# Patient Record
Sex: FEMALE | Race: WHITE | NOT HISPANIC OR LATINO | Employment: OTHER | URBAN - METROPOLITAN AREA
[De-identification: names, ages, dates, MRNs, and addresses within clinical notes are randomized per-mention and may not be internally consistent; named-entity substitution may affect disease eponyms.]

---

## 2017-01-11 ENCOUNTER — INFUSION THERAPY VISIT (OUTPATIENT)
Dept: INFUSION THERAPY | Facility: CLINIC | Age: 77
End: 2017-01-11
Attending: ALLERGY & IMMUNOLOGY
Payer: MEDICARE

## 2017-01-11 VITALS
OXYGEN SATURATION: 98 % | HEART RATE: 62 BPM | WEIGHT: 146.3 LBS | RESPIRATION RATE: 16 BRPM | TEMPERATURE: 98.3 F | DIASTOLIC BLOOD PRESSURE: 59 MMHG | SYSTOLIC BLOOD PRESSURE: 117 MMHG | BODY MASS INDEX: 19.84 KG/M2

## 2017-01-11 DIAGNOSIS — D83.9 CVID (COMMON VARIABLE IMMUNODEFICIENCY) (H): Primary | ICD-10-CM

## 2017-01-11 PROCEDURE — 96375 TX/PRO/DX INJ NEW DRUG ADDON: CPT

## 2017-01-11 PROCEDURE — 25000125 ZZHC RX 250: Performed by: ALLERGY & IMMUNOLOGY

## 2017-01-11 PROCEDURE — 25000128 H RX IP 250 OP 636: Performed by: ALLERGY & IMMUNOLOGY

## 2017-01-11 PROCEDURE — 96365 THER/PROPH/DIAG IV INF INIT: CPT

## 2017-01-11 PROCEDURE — 96366 THER/PROPH/DIAG IV INF ADDON: CPT

## 2017-01-11 RX ADMIN — HUMAN IMMUNOGLOBULIN G 35 G: 20 LIQUID INTRAVENOUS at 11:20

## 2017-01-11 RX ADMIN — HYDROCORTISONE SODIUM SUCCINATE 100 MG: 100 INJECTION, POWDER, FOR SOLUTION INTRAMUSCULAR; INTRAVENOUS at 10:54

## 2017-01-11 NOTE — MR AVS SNAPSHOT
After Visit Summary   1/11/2017    Tricia Sosa    MRN: [id number]           Patient Information     Date Of Birth          1940        Visit Information        Provider Department      1/11/2017 10:00 AM RH INFUSION CHAIR 12 [facility]        Today's Diagnoses     CVID (common variable immunodeficiency) (H)    -  1        Follow-ups after your visit        Your next 10 appointments already scheduled     Feb 08, 2017 10:30 AM   Level 5 with RH INFUSION CHAIR 10   [facility] ([facility])    [facility]  [address]  [place] [address]   [phone]            Feb 14, 2017  1:15 PM   Return Visit with Jose Summers MD   [facility] ([facility])    [facility]  [address]  [facility] [address]   [phone]            Mar 08, 2017 10:30 AM   Level 5 with RH INFUSION CHAIR 2   [facility] ([facility])    [facility]  [address]  [facility] [address]   [phone]            Apr 05, 2017 10:30 AM   Level 5 with RH INFUSION CHAIR 5   [facility] ([facility])    [facility]  [address]  [facility] [address]   [phone]            May 03, 2017 10:30 AM   Level 5 with RH INFUSION CHAIR 2   [facility] ([facility])    [facility] [address]  [place] [address]   [phone]            May 31, 2017 10:30 AM   Level 5 with RH INFUSION CHAIR 2   [facility] ([facility])    [facility] [address]  [place] [address]   [phone]               Who to contact     If you have questions or need follow up information about today's clinic visit or your schedule please contact [facility] directly at [phone].  Normal or non-critical lab and imaging results will be communicated to you by MyChart, letter or phone within 4 business days after the clinic has received the results. If you do not hear from us within 7 days, please contact the clinic through [employer] or phone. If you have a critical or abnormal lab result, we will notify you by phone as soon as possible.  Submit refill requests through [employer] or call your pharmacy and they will forward the refill request to us. Please allow 3 business days for your refill to be completed.          Additional Information About Your Visit        [employer] Information     [employer] gives you secure access to your electronic health record. If you see a primary care provider, you can also send messages to your care team and make appointments. If you have questions, please call your primary care clinic.  If you do not have a primary care provider, please call [phone] and they will assist you.        Care EveryWhere ID     This is your Care EveryWhere ID. This could be used by other organizations to access your [place] medical records  [id number]        Your Vitals Were     Pulse Temperature Respirations Pulse Oximetry          62 98.3  F (36.8  C) (Tympanic) 16 98%         Blood Pressure from Last 3 Encounters:   01/11/17 117/59   12/06/16 126/60   11/11/16 119/57    Weight from Last 3 Encounters:   01/11/17 66.361 kg (146 lb 4.8 oz)   12/06/16 65.59 kg (144 lb 9.6 oz)   11/11/16 66.134 kg (145 lb 12.8 oz)              Today, you had the following     No orders found for display       Primary Care Provider Office Phone # Fax #    SHANIQUE Walsh Ra [facility] [phone]       [facility] [place] [address]  [facility] [address]        Thank you!     Thank you  for [facility]  for your care. Our goal is always to provide you with excellent care. Hearing back from our patients is one way we can continue to improve our services. Please take a few minutes to complete the written survey that you may receive in the mail after your visit with us. Thank you!             Your Updated Medication List - Protect others around you: Learn how to safely use, store and throw away your medicines at www.disposemymeds.org.          This list is accurate as of: 1/11/17  2:00 PM.  Always use your most recent med list.                   Brand Name Dispense Instructions for use    cyanocobalamin 1000 MCG tablet    vitamin  B-12         folic acid 1 MG tablet    FOLVITE         ketoconazole 2 % shampoo    NIZORAL         levothyroxine 137 MCG tablet    SYNTHROID/LEVOTHROID    90 tablet    Take 1 tablet (137 mcg) by mouth daily       sulfamethoxazole-trimethoprim 800-160 MG per tablet    BACTRIM DS    36 tablet    Take 1 tablet three times per week.

## 2017-01-11 NOTE — PROGRESS NOTES
"Infusion Nursing Note:  Tricia Sosa presents today for IVIG.    Patient seen by provider today: No    Note: Patient had IVIG last March and experienced sinus headaches, nausea, chills starting the day after infusion. Therefore, nurse infused IVIG at 0.5mg/kg/hr and increased by 0.5mg/kg/hr every 15 minutes for a max of 4mg/kg/hr. Patient aware to let us know at her last infusion if she got any headaches. Dr order for Prednisone after todays infusion only if patient experiences any side effects. Patient is to call Dr. Rutherford office first before taking Prednisone.  If she has any unusual symptoms she is to call Dr. Rutherford and ask whether she should take the Prednisone or not. If patient doesn't experience any symptoms, she is aware NOT to take Prednisone. Patient said she has Prednisone 5, mg and 10 mg tablets at home from a recent prescription. Per pharmacy, no need to refill another prescription.     Intravenous Access:  Peripheral IV placed.    Treatment Conditions:  Rheumatology Infusion Checklist PRIOR TO INFUSION OF BIOLOGICAL MEDICATIONS OR ANY OF THESE AS LISTED: Rituxan (rituximab) \".rheumbiologicalchecklist\"    Prior to Infusion of biological medications or any of these as listed:    1. Elevated temperature, fever, chills, productive cough or abnormal vital signs, night sweats, coughing up blood or sputum, no appetite or abnormal vital signs : NO    2. Open wounds or new incisions: NO    3. Recent hospitalization: NO    4.  Recent surgeries:  NO    5. Any upcoming surgeries or dental procedures?:NO    6. Any current or recent bouts of illness or infection? On any antibiotics? : NO    7. Any new, sudden or worsening abdominal pain :NO    8. Vaccination within 4 weeks? Patient or someone in the household is scheduled to receive vaccination? No live virus vaccines prior to or during treatment :NO    9. Any nervous system diseases [i.e. multiple sclerosis, Guillain-Garnett, seizures, neurological  changes]: " NO    10. Pregnant or breast feeding; or plans on pregnancy in the future: NO    11. Signs of worsening depression or suicidal ideations while taking benlysta:NO    12. New-onset medical symptoms: NO    13.  New cancer diagnosis or on chemotherapy or radiation NO    14.  Evaluate for any sign of active TB [Unexplained weight loss, Loss of appetite, Night sweats, Fever, Fatigue, Chills, Coughing for 3 weeks or longer, Hemoptysis (coughing up blood), Chest pain]: NO    **Note: If answered yes to any of the above, hold the infusion and contact ordering rheumatologist or on-call rheumatologist.   .      Post Infusion Assessment:  Patient tolerated infusion without incident.  Blood return noted pre and post infusion.  Site patent and intact, free from redness, edema or discomfort.  No evidence of extravasations.  Access discontinued per protocol.    Discharge Plan:   Patient declined prescription refills.  Copy of AVS reviewed with patient and/or family.     spoke with Marty Meese, Pharmacist and they discussed her returning every 4 weeks for treatment. Patient to stop by scheduling office to make next appt due in 4 weeks.    Estrellita Choi RN

## 2017-02-06 DIAGNOSIS — D59.19 OTHER AUTOIMMUNE HEMOLYTIC ANEMIAS: ICD-10-CM

## 2017-02-06 DIAGNOSIS — D83.9 CVID (COMMON VARIABLE IMMUNODEFICIENCY) (H): ICD-10-CM

## 2017-02-06 LAB — LDH SERPL L TO P-CCNC: 767 U/L (ref 81–234)

## 2017-02-06 PROCEDURE — 82784 ASSAY IGA/IGD/IGG/IGM EACH: CPT | Mod: 59 | Performed by: INTERNAL MEDICINE

## 2017-02-06 PROCEDURE — 85045 AUTOMATED RETICULOCYTE COUNT: CPT | Performed by: INTERNAL MEDICINE

## 2017-02-06 PROCEDURE — 80053 COMPREHEN METABOLIC PANEL: CPT | Performed by: INTERNAL MEDICINE

## 2017-02-06 PROCEDURE — 82607 VITAMIN B-12: CPT | Performed by: INTERNAL MEDICINE

## 2017-02-06 PROCEDURE — 83540 ASSAY OF IRON: CPT | Performed by: INTERNAL MEDICINE

## 2017-02-06 PROCEDURE — 82747 ASSAY OF FOLIC ACID RBC: CPT | Mod: 90 | Performed by: INTERNAL MEDICINE

## 2017-02-06 PROCEDURE — 85025 COMPLETE CBC W/AUTO DIFF WBC: CPT | Performed by: INTERNAL MEDICINE

## 2017-02-06 PROCEDURE — 85060 BLOOD SMEAR INTERPRETATION: CPT | Performed by: INTERNAL MEDICINE

## 2017-02-06 PROCEDURE — 83550 IRON BINDING TEST: CPT | Performed by: INTERNAL MEDICINE

## 2017-02-06 PROCEDURE — 82728 ASSAY OF FERRITIN: CPT | Performed by: INTERNAL MEDICINE

## 2017-02-06 PROCEDURE — 99000 SPECIMEN HANDLING OFFICE-LAB: CPT | Performed by: INTERNAL MEDICINE

## 2017-02-06 PROCEDURE — 36415 COLL VENOUS BLD VENIPUNCTURE: CPT | Performed by: INTERNAL MEDICINE

## 2017-02-06 PROCEDURE — 83010 ASSAY OF HAPTOGLOBIN QUANT: CPT | Performed by: INTERNAL MEDICINE

## 2017-02-06 PROCEDURE — 83615 LACTATE (LD) (LDH) ENZYME: CPT | Performed by: INTERNAL MEDICINE

## 2017-02-06 PROCEDURE — 82784 ASSAY IGA/IGD/IGG/IGM EACH: CPT | Performed by: INTERNAL MEDICINE

## 2017-02-07 LAB
ALBUMIN SERPL-MCNC: 4.4 G/DL (ref 3.4–5)
ALP SERPL-CCNC: 88 U/L (ref 40–150)
ALT SERPL W P-5'-P-CCNC: 40 U/L (ref 0–50)
ANION GAP SERPL CALCULATED.3IONS-SCNC: 9 MMOL/L (ref 3–14)
AST SERPL W P-5'-P-CCNC: 80 U/L (ref 0–45)
BILIRUB SERPL-MCNC: 2 MG/DL (ref 0.2–1.3)
BUN SERPL-MCNC: 14 MG/DL (ref 7–30)
CALCIUM SERPL-MCNC: 8.4 MG/DL (ref 8.5–10.1)
CHLORIDE SERPL-SCNC: 104 MMOL/L (ref 94–109)
CO2 SERPL-SCNC: 29 MMOL/L (ref 20–32)
COPATH REPORT: NORMAL
CREAT SERPL-MCNC: 0.74 MG/DL (ref 0.52–1.04)
DIFFERENTIAL METHOD BLD: ABNORMAL
EOSINOPHIL # BLD AUTO: 0.1 10E9/L (ref 0–0.7)
EOSINOPHIL NFR BLD AUTO: 1 %
ERYTHROCYTE [DISTWIDTH] IN BLOOD BY AUTOMATED COUNT: 13.5 % (ref 10–15)
FERRITIN SERPL-MCNC: 45 NG/ML (ref 8–252)
GFR SERPL CREATININE-BSD FRML MDRD: 76 ML/MIN/1.7M2
GLUCOSE SERPL-MCNC: 83 MG/DL (ref 70–99)
HAPTOGLOB SERPL-MCNC: <6 MG/DL (ref 35–175)
HCT VFR BLD AUTO: 28.4 % (ref 35–47)
HGB BLD-MCNC: 10.1 G/DL (ref 11.7–15.7)
IGA SERPL-MCNC: <7 MG/DL (ref 70–380)
IGG SERPL-MCNC: 697 MG/DL (ref 695–1620)
IGM SERPL-MCNC: 82 MG/DL (ref 60–265)
IRON SATN MFR SERPL: 23 % (ref 15–46)
IRON SERPL-MCNC: 75 UG/DL (ref 35–180)
LYMPHOCYTES # BLD AUTO: 1 10E9/L (ref 0.8–5.3)
LYMPHOCYTES NFR BLD AUTO: 18 %
MCH RBC QN AUTO: 35.7 PG (ref 26.5–33)
MCHC RBC AUTO-ENTMCNC: 35.6 G/DL (ref 31.5–36.5)
MCV RBC AUTO: 100 FL (ref 78–100)
MONOCYTES # BLD AUTO: 0.3 10E9/L (ref 0–1.3)
MONOCYTES NFR BLD AUTO: 5 %
NEUTROPHILS # BLD AUTO: 4 10E9/L (ref 1.6–8.3)
NEUTROPHILS NFR BLD AUTO: 76 %
PLATELET # BLD AUTO: 178 10E9/L (ref 150–450)
POTASSIUM SERPL-SCNC: 3.9 MMOL/L (ref 3.4–5.3)
PROT SERPL-MCNC: 6.8 G/DL (ref 6.8–8.8)
RBC # BLD AUTO: 2.83 10E12/L (ref 3.8–5.2)
RBC MORPH BLD: ABNORMAL
RETICS # AUTO: 81.5 10E9/L (ref 25–95)
RETICS/RBC NFR AUTO: 2.9 % (ref 0.5–2)
SODIUM SERPL-SCNC: 142 MMOL/L (ref 133–144)
TIBC SERPL-MCNC: 325 UG/DL (ref 240–430)
VARIANT LYMPHS BLD QL SMEAR: PRESENT
VIT B12 SERPL-MCNC: 854 PG/ML (ref 193–986)
WBC # BLD AUTO: 5.4 10E9/L (ref 4–11)

## 2017-02-08 ENCOUNTER — INFUSION THERAPY VISIT (OUTPATIENT)
Dept: INFUSION THERAPY | Facility: CLINIC | Age: 77
End: 2017-02-08
Attending: ALLERGY & IMMUNOLOGY
Payer: MEDICARE

## 2017-02-08 VITALS
TEMPERATURE: 97.5 F | RESPIRATION RATE: 16 BRPM | BODY MASS INDEX: 19.81 KG/M2 | SYSTOLIC BLOOD PRESSURE: 135 MMHG | DIASTOLIC BLOOD PRESSURE: 68 MMHG | HEART RATE: 75 BPM | WEIGHT: 146.1 LBS

## 2017-02-08 DIAGNOSIS — D83.9 CVID (COMMON VARIABLE IMMUNODEFICIENCY) (H): Primary | ICD-10-CM

## 2017-02-08 LAB
FOLATE RBC-MCNC: NORMAL NG/ML
HCT VFR BLD CALC: 28.4 %

## 2017-02-08 PROCEDURE — 96366 THER/PROPH/DIAG IV INF ADDON: CPT

## 2017-02-08 PROCEDURE — 96375 TX/PRO/DX INJ NEW DRUG ADDON: CPT

## 2017-02-08 PROCEDURE — 96365 THER/PROPH/DIAG IV INF INIT: CPT

## 2017-02-08 PROCEDURE — 25000128 H RX IP 250 OP 636: Performed by: ALLERGY & IMMUNOLOGY

## 2017-02-08 RX ADMIN — HUMAN IMMUNOGLOBULIN G 35 G: 20 LIQUID INTRAVENOUS at 11:16

## 2017-02-08 RX ADMIN — HYDROCORTISONE SODIUM SUCCINATE 100 MG: 100 INJECTION, POWDER, FOR SOLUTION INTRAMUSCULAR; INTRAVENOUS at 10:51

## 2017-02-08 NOTE — PROGRESS NOTES
"Infusion Nursing Note:  Tricia Sosa presents today for IVIG.    Patient seen by provider today: No   present during visit today: Not Applicable.    Note: Patient tolerated IVIG last time with just solucortef as premed (previously would get benadryl and tylenol).  Did not have to take prednisone after.    Started infusion at 0.5mg/kg/hr and increased by 0.5mg/kg/hr every 15 minutes for a maximum of 4mg/kg/hr.    Intravenous Access:  Peripheral IV placed.    Treatment Conditions:  Rheumatology Infusion Checklist: PRIOR TO INFUSION OF BIOLOGICAL MEDICATIONS OR ANY OF THESE AS LISTED: Immune Globulin (IVIG) \".rheumbiologicalchecklist\"    Prior to Infusion of biological medications or any of these as listed:    1. Elevated temperature, fever, chills, productive cough or abnormal vital signs, night sweats, coughing up blood or sputum, no appetite or abnormal vital signs : NO    2. Open wounds or new incisions: NO    3. Recent hospitalization: NO    4.  Recent surgeries:  NO    5. Any upcoming surgeries or dental procedures?:NO    6. Any current or recent bouts of illness or infection? On any antibiotics? : NO    7. Any new, sudden or worsening abdominal pain :NO    8. Vaccination within 4 weeks? Patient or someone in the household is scheduled to receive vaccination? No live virus vaccines prior to or during treatment :NO    9. Any nervous system diseases [i.e. multiple sclerosis, Guillain-Glendo, seizures, neurological  changes]: NO    10. Pregnant or breast feeding; or plans on pregnancy in the future: NO    11. Signs of worsening depression or suicidal ideations while taking benlysta:NO    12. New-onset medical symptoms: NO    13.  New cancer diagnosis or on chemotherapy or radiation NO    14.  Evaluate for any sign of active TB [Unexplained weight loss, Loss of appetite, Night sweats, Fever, Fatigue, Chills, Coughing for 3 weeks or longer, Hemoptysis (coughing up blood), Chest pain]: NO    **Note: If " answered yes to any of the above, hold the infusion and contact ordering rheumatologist or on-call rheumatologist.   .      Post Infusion Assessment:  Patient tolerated infusion without incident.  Blood return noted pre and post infusion.  Site patent and intact, free from redness, edema or discomfort.  No evidence of extravasations.  Access discontinued per protocol.    Discharge Plan:   Copy of AVS reviewed with patient and/or family.  Patient will return 2/14 with Dr. Summers and 3/8 for IVIG for next appointment.  Patient discharged in stable condition accompanied by: self.  Departure Mode: Ambulatory.    Ally Adams RN

## 2017-02-14 ENCOUNTER — ONCOLOGY VISIT (OUTPATIENT)
Dept: ONCOLOGY | Facility: CLINIC | Age: 77
End: 2017-02-14
Attending: INTERNAL MEDICINE
Payer: COMMERCIAL

## 2017-02-14 VITALS
DIASTOLIC BLOOD PRESSURE: 75 MMHG | BODY MASS INDEX: 19.8 KG/M2 | HEART RATE: 76 BPM | RESPIRATION RATE: 16 BRPM | SYSTOLIC BLOOD PRESSURE: 128 MMHG | WEIGHT: 146 LBS | TEMPERATURE: 98.5 F

## 2017-02-14 DIAGNOSIS — D83.9 CVID (COMMON VARIABLE IMMUNODEFICIENCY) (H): Primary | ICD-10-CM

## 2017-02-14 PROCEDURE — 99213 OFFICE O/P EST LOW 20 MIN: CPT | Performed by: INTERNAL MEDICINE

## 2017-02-14 PROCEDURE — 99211 OFF/OP EST MAY X REQ PHY/QHP: CPT

## 2017-02-14 RX ORDER — DIPHENHYDRAMINE HCL 50 MG
50 CAPSULE ORAL ONCE
Status: CANCELLED
Start: 2017-03-07

## 2017-02-14 RX ORDER — ACETAMINOPHEN 325 MG/1
650 TABLET ORAL ONCE
Status: CANCELLED
Start: 2017-03-07

## 2017-02-14 NOTE — MR AVS SNAPSHOT
After Visit Summary   2/14/2017    Shorty Sosa    MRN: [id number]           Patient Information     Date Of Birth          1940        Visit Information        Provider Department      2/14/2017 1:15 PM Melina, Jose Escudero MD [facility]        Care Instructions    - Follow up in 2 months with labs prior to clinic visit (few days prior to visit) Scheduled 4/7/17, shorty will get her labs drawn at [facility], she will call to schedule appt. Aura KELLEY      - Move the infusions to Tuesday or Friday Scheduled 3/10, 4/7, 5/5, 6/2 Aura KELLEY      - I will see her in around 8 weeks  (first week of April). I can see her in infusion Scheduled 4/7/17 Aura KELLEY  AVS given to patient           Follow-ups after your visit        Your next 10 appointments already scheduled     Mar 10, 2017 10:30 AM CST   Level 5 with RH INFUSION CHAIR 5   [facility] ([facility])    [facility]  [address]  [place] [address]   [phone]            Apr 07, 2017 10:30 AM CDT   Level 5 with RH INFUSION CHAIR 2   [facility] ([facility])    [facility]  [address]  [place] [address]   [phone]            Apr 07, 2017 10:45 AM CDT   Return Visit with Jose Summers MD   [facility] ([facility])    [facility]  [address]  [place] [address]   [phone]            May 05, 2017 10:30 AM CDT   Level 5 with RH INFUSION CHAIR 5   [facility] ([facility])    [facility]  [address]  [place] [address]   [phone]            Jun 02, 2017 10:30 AM CDT   Level 5 with RH INFUSION CHAIR 5   [facility] ([facility])    [facility] [address]  [address]  [facility] [address] [phone]              Who to contact     If you have questions or need follow up information about today's clinic visit or your schedule please contact [facility] directly at [phone].  Normal or non-critical lab and imaging results will be communicated to you by MyChart, letter or phone within 4 business days after the clinic has received the results. If you do not hear from us within 7 days, please contact the clinic through MyChart or phone. If you have a critical or abnormal lab result, we will notify you by phone as soon as possible.  Submit refill requests through [employer] or call your pharmacy and they will forward the refill request to us. Please allow 3 business days for your refill to be completed.          Additional Information About Your Visit        MyChart Information     [employer] gives you secure access to your electronic health record. If you see a primary care provider, you can also send messages to your care team and make appointments. If you have questions, please call your primary care clinic.  If you do not have a primary care provider, please call [phone] and they will assist you.        Care EveryWhere ID     This is your Care EveryWhere ID. This could be used by other organizations to access your [place] medical records  [id number]        Your Vitals Were     Pulse Temperature Respirations BMI (Body Mass Index)          76 98.5  F (36.9  C) (Tympanic) 16 19.8 kg/m2         Blood Pressure from Last 3 Encounters:   02/14/17 128/75   02/08/17 135/68   01/11/17 117/59    Weight from Last 3 Encounters:   02/14/17 66.2 kg (146 lb)   02/08/17 66.3 kg (146 lb 1.6 oz)   01/11/17 66.4 kg (146 lb 4.8 oz)              Today, you had the following     No orders found for display       Primary Care Provider Office Phone # Fax #    SHANIQUE Walsh Ra CNP  [phone]       [facility] [address]  Count includes the [facility] [address]        Thank you!     Thank you for choosing [facility]  for your care. Our goal is always to provide you with excellent care. Hearing back from our patients is one way we can continue to improve our services. Please take a few minutes to complete the written survey that you may receive in the mail after your visit with us. Thank you!             Your Updated Medication List - Protect others around you: Learn how to safely use, store and throw away your medicines at www.disposemymeds.org.          This list is accurate as of: 2/14/17  2:13 PM.  Always use your most recent med list.                   Brand Name Dispense Instructions for use    cyanocobalamin 1000 MCG tablet    vitamin  B-12         folic acid 1 MG tablet    FOLVITE         ketoconazole 2 % shampoo    NIZORAL         levothyroxine 137 MCG tablet    SYNTHROID/LEVOTHROID    90 tablet    Take 1 tablet (137 mcg) by mouth daily       sulfamethoxazole-trimethoprim 800-160 MG per tablet    BACTRIM DS    36 tablet    Take 1 tablet three times per week.

## 2017-02-14 NOTE — NURSING NOTE
"Tricia Sosa is a 76 year old female who presents for:  Chief Complaint   Patient presents with     Oncology Clinic Visit     follow up/ 2 month/ hemolytic anemia        Initial Vitals:  /75 (BP Location: Right arm, Patient Position: Chair, Cuff Size: Adult Regular)  Pulse 76  Temp 98.5  F (36.9  C) (Tympanic)  Resp 16  Wt 66.2 kg (146 lb)  BMI 19.8 kg/m2 Estimated body mass index is 19.8 kg/(m^2) as calculated from the following:    Height as of 12/6/16: 1.829 m (6' 0.01\").    Weight as of this encounter: 66.2 kg (146 lb).. Body surface area is 1.83 meters squared. BP completed using cuff size: regular  Data Unavailable No LMP recorded. Patient is postmenopausal. Allergies and medications reviewed.     Medications: Medication refills not needed today.  Pharmacy name entered into [employer]:    [id number] - [facility] [address] [place] [address] PHARMACY [facility] [address] [facility]    Comments: feeling good/ wanting Dr. Summers to be her physician.    10 minutes for nursing intake (face to face time)   Ninfa Dowling LPN    DISCHARGE PLAN:  Next appointments: See patient instruction section  Departure Mode: Ambulatory  Accompanied by: self  5 minutes for nursing discharge (face to face time)   Ninfa Dowling LPN    "

## 2017-02-14 NOTE — PATIENT INSTRUCTIONS
- Follow up in 2 months with labs prior to clinic visit (few days prior to visit) Scheduled 4/7/17, shorty will get her labs drawn at [facility], she will call to schedule appt. Aura KELLEY      - Move the infusions to Tuesday or Friday Scheduled 3/10, 4/7, 5/5, 6/2 Aura KELLEY      - I will see her in around 8 weeks  (first week of April). I can see her in infusion Scheduled 4/7/17 Aura KELLEY  AVS given to patient

## 2017-02-15 NOTE — PROGRESS NOTES
[facility] CANCER CLINIC  FOLLOW-UP VISIT NOTE    PATIENT NAME: Tricia Sosa MRN # [id number]  DATE OF VISIT: Feb 14, 2017 YOB: 1940    REFERRING PROVIDER: No referring provider defined for this encounter.    DIAGNOSIS: Hemolytic anemia-autoimmune; IgA deficiency    TREATMENT SUMMARY:  Tricia has been diagnosed with IgA deficiency since her around 2000.  She was very sick at the time and had severe diarrhea.  She lost about 40 pounds in weight.  The workup revealed that she had markedly diminished CD4 counts of 48 and was diagnosed with CMV colitis.  Since then she has been followed in hematology clinic at [place] until recently.  She was noted to have anemia which was fairly long-standing.  She was initially referred for anemia in 2004 and also had a bone marrow biopsy done at that time which revealed hypercellular bone marrow with 70-80% cellularity and normal trilineage hematopoiesis and no morphologic features of MDS or lymphoproliferation.  Her anemia was attributed to her chronic underlying disease.  At the next evaluation in 2002 on 4 aggressive anemia there was no evidence of hemolysis, iron deficiency, B12 or folate deficiency.  Bone marrow biopsy was not pursued.  In summer of 2015 she had progressive fatigue, dyspnea on exertion and worsening anemia with a hemoglobin now of 9.  Workup at this time did suggest a hemolytic anemia with elevated LDH at 709, undetectable haptoglobin and elevated unconjugated bilirubin at 3.3.  Monospecific MARIKA was positive for both IgG and complement.  Cold agglutinin screen was positive with very low titer 1:64.  She was started on prednisone 60 mg daily with supplementation of iron folate and B12 starting 7/31/15.  Her prednisone has been slowly tapered and was discontinued off in January 2016.  She was last followed at [facility] on March 10 when she had stable hemoglobin.      SUBJECTIVE   Tricia comes alone for this clinic visit for  her hemolytic anemia.    Tricia is doing well overall. She has no new complains. She is here with labs done prior to clinic visit.       PAST MEDICAL HISTORY   1. Common variable immunodeficiency syndrome  2. 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 Selective IgA deficiency  4. Leukopenia with markedly low CD4 counts on Bactrim prophylaxis  5. History of fall with subdural hematoma in 6/11/14 with complete resolution  6. Hashimoto's disease status post partial thyroidectomy      CURRENT OUTPATIENT MEDICATIONS     Current Outpatient Prescriptions   Medication Sig     sulfamethoxazole-trimethoprim (BACTRIM DS) 800-160 MG per tablet Take 1 tablet three times per week.     levothyroxine (SYNTHROID, LEVOTHROID) 137 MCG tablet Take 1 tablet (137 mcg) by mouth daily     ketoconazole (NIZORAL) 2 % shampoo      cyanocobalamin (VITAMIN  B-12) 1000 MCG tablet      folic acid (FOLVITE) 1 MG tablet      No current facility-administered medications for this visit.         ALLERGIES     Allergies   Allergen Reactions     Doxycycline         REVIEW OF SYSTEMS   As above in the HPI, o/w complete 12-point ROS was negative.     PHYSICAL EXAM   /75 (BP Location: Right arm, Patient Position: Chair, Cuff Size: Adult Regular)  Pulse 76  Temp 98.5  F (36.9  C) (Tympanic)  Resp 16  Wt 66.2 kg (146 lb)  BMI 19.8 kg/m2    SpO2 Readings from Last 4 Encounters:   01/11/17 98%   12/06/16 98%   11/11/16 98%   11/02/16 99%     Wt Readings from Last 3 Encounters:   02/14/17 66.2 kg (146 lb)   02/08/17 66.3 kg (146 lb 1.6 oz)   01/11/17 66.4 kg (146 lb 4.8 oz)     GEN: NAD  HEENT: PERRL, EOMI, no icterus, injection or pallor. Oropharynx is clear.  NECK: no cervical or supraclavicular lymphadenopathy  LUNGS: clear bilaterally  CV: regular, no murmurs, rubs, or gallops  ABDOMEN: soft, non-tender, non-distended, normal bowel sounds, no hepatosplenomegaly by percussion or palpation  EXT: warm, well  perfused, no edema  NEURO: alert  SKIN: no rashes   LABORATORY AND IMAGING STUDIES     Recent Labs   Lab Test  02/06/17   1351  11/28/16   0919  11/02/16   1520  08/22/16   0901  06/06/16   1001   NA  142  142  140  142  143   POTASSIUM  3.9  4.3  3.9  4.3  3.9   CHLORIDE  104  106  106  108  109   CO2  29  28  28  28  30   ANIONGAP  9  8  6  6  4   BUN  14  11  18  11  9   CR  0.74  0.75  0.84  0.71  0.70   GLC  83  88  117*  91  104*   CULLEN  8.4*  9.0  8.6  8.6  8.6     No results for input(s): MAG, PHOS in the last 17932 hours.  Recent Labs   Lab Test  02/06/17   1351  11/28/16   0919  11/07/16   0904  11/02/16   1520  08/22/16   0901   09/21/15   0912   WBC  5.4  5.2  4.6  6.2  4.3   < >  5.0   HGB  10.1*  10.6*  10.4*  10.3*  11.5*   < >  12.6   PLT  178  203  204  222  183   < >  193   MCV  100  100  98  96  97   < >  92   NEUTROPHIL  76.0  68.0  62.0   --   59.0   --   67.0    < > = values in this interval not displayed.     Recent Labs   Lab Test  02/06/17   1351  11/28/16   0919  11/07/16   0904  11/02/16   1520   BILITOTAL  2.0*  2.3*   --   1.8*   ALKPHOS  88  104   --   104   ALT  40  30   --   25   AST  80*  48*   --   40   ALBUMIN  4.4  4.2   --   4.0   LDH  767*  501*  397*   --      TSH   Date Value Ref Range Status   03/10/2016 0.7 mcU/mL Final   09/04/2015 0.95 0.40 - 4.00 mU/L Final   07/30/2015 8.5 mcU/mL Final     Recent Labs   Lab Test  02/06/17   1351  11/28/16   0919  11/07/16   0904  08/22/16   0901  02/08/16   0935   B12  854  753  776  702  579     Recent Labs   Lab Test  02/06/17   1351  11/28/16   0919  11/07/16   0904  08/22/16   0901  04/14/15   1116   MARIA ALEJANDRA  45  55  53  48  35   IRON  75  118  100  86  131   FEB  325  305  307  294  350           ASSESSMENT AND PLAN   1. 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 Common variable immunodeficiency disorder  4. Splenomegaly    Tricia is doing  well and has no new complains. I reviewed all her labs with her. For the most part - the most important one - her Hgb remains stable at 10.1 and elevated from last visit. She continues to have no Ig A and low Ig G/Ig M. She continues to hemolyze and has undetectable haptoglobin, rising bilirubin 2.3 (up from 1.8 a month ago), rising LDH at 767 from 500 previously.     I reviewed option of Ig infusions. She has started it and had 2 infusions so far.     She had a fair response to Ig infusions previously and it had helped her Hgb counts too. We would continue to do her infusions here.     I will see her in 2 months with labs and scans.     Jose Summers  ,  Division of Hematology, Oncology & Transplantation  [facility].

## 2017-03-01 ENCOUNTER — OFFICE VISIT (OUTPATIENT)
Dept: FAMILY MEDICINE | Facility: CLINIC | Age: 77
End: 2017-03-01
Payer: COMMERCIAL

## 2017-03-01 VITALS
OXYGEN SATURATION: 100 % | SYSTOLIC BLOOD PRESSURE: 138 MMHG | BODY MASS INDEX: 20.11 KG/M2 | TEMPERATURE: 98.1 F | HEART RATE: 82 BPM | HEIGHT: 72 IN | DIASTOLIC BLOOD PRESSURE: 72 MMHG | WEIGHT: 148.5 LBS

## 2017-03-01 DIAGNOSIS — M54.2 CERVICALGIA: Primary | ICD-10-CM

## 2017-03-01 DIAGNOSIS — D83.9 CVID (COMMON VARIABLE IMMUNODEFICIENCY) (H): ICD-10-CM

## 2017-03-01 DIAGNOSIS — D59.19 OTHER AUTOIMMUNE HEMOLYTIC ANEMIAS: ICD-10-CM

## 2017-03-01 PROCEDURE — 99213 OFFICE O/P EST LOW 20 MIN: CPT | Performed by: NURSE PRACTITIONER

## 2017-03-01 NOTE — MR AVS SNAPSHOT
After Visit Summary   3/1/2017    Tricia Sosa    MRN: [id number]           Patient Information     Date Of Birth          1940        Visit Information        Provider Department      3/1/2017 11:20 AM Monika Whipple Ra, [facility]        Today's Diagnoses     Cervicalgia    -  1      Care Instructions    Physical therapy will call you to schedule an appointment.    If you decide you would like a medication to help with sleep please call me.        Follow-ups after your visit        Additional Services     [facility] PT, HAND, AND CHIROPRACTIC REFERRAL       **This order will print in the [facility] Scheduling Office**    Physical Therapy, Hand Therapy and Chiropractic Care are available through:    *[facility]  *[facility]  *[facility]    Call one number to schedule at any of the above locations: [phone].    Your provider has referred you to: Physical Therapy at [facility] or [facility]    Indication/Reason for Referral: Neck Pain  Onset of Illness: years  Therapy Orders: Evaluate and Treat  Special Programs: None  Special Request: None    Mayra Nunez      Additional Comments for the Therapist or Chiropractor:     Please be aware that coverage of these services is subject to the terms and limitations of your health insurance plan.  Call member services at your health plan with any benefit or coverage questions.      Please bring the following to your appointment:    *Your personal calendar for scheduling future appointments  *Comfortable clothing                  Your next 10 appointments already scheduled     Mar 10, 2017 10:30 AM CST   Level 5 with  INFUSION CHAIR 5   [facility] ([facility])    [facility]  [address]  [facility] [address]   [phone]            Apr 07, 2017 10:30 AM CDT   Level 5 with RH INFUSION CHAIR 2   [facility] ([facility])    [facility]  [address]  [facility] [address]   [phone]            Apr 07, 2017 10:45 AM CDT   Return Visit with Jose Summers MD   [facility] ([facility])    [facility]  [address]  [facility] [address]   [phone]            May 05, 2017 10:30 AM CDT   Level 5 with RH INFUSION CHAIR 5   [facility] ([facility])    [facility]  [address]  [facility] [address]   [phone]            Jun 02, 2017 10:30 AM CDT   Level 5 with RH INFUSION CHAIR 5   [facility] ([facility])    [facility]  [address]  [facility] [address]   [phone]              Who to contact     If you have questions or need follow up information about today's clinic visit or your schedule please contact [facility] directly at [phone].  Normal or non-critical lab and imaging results will be communicated to you by MyChart, letter or phone within 4 business days after the clinic has received the results. If you do not hear from us within 7 days, please contact the clinic through [employer] or phone. If you have a critical or abnormal lab result, we will notify you by phone as soon as possible.  Submit refill requests through [employer] or call your pharmacy and they will forward the refill request to us. Please allow 3 business days for your refill to be completed.          Additional Information About Your Visit        [employer] Information     [employer] gives you secure access to your electronic health record. If you see a primary care provider, you can also send messages to your care team and make appointments. If you have questions, please call your primary  [facility] clinic.  If you do not have a primary care provider, please call [phone] and they will assist you.        Care EveryWhere ID     This is your Care EveryWhere ID. This could be used by other organizations to access your [place] medical records  [id number]        Your Vitals Were     Pulse Temperature Height Pulse Oximetry BMI (Body Mass Index)       82 98.1  F (36.7  C) (Oral) 6' (1.829 m) 100% 20.14 kg/m2        Blood Pressure from Last 3 Encounters:   03/01/17 138/72   02/14/17 128/75   02/08/17 135/68    Weight from Last 3 Encounters:   03/01/17 148 lb 8 oz (67.4 kg)   02/14/17 146 lb (66.2 kg)   02/08/17 146 lb 1.6 oz (66.3 kg)              We Performed the Following     [facility] PT, HAND, AND CHIROPRACTIC REFERRAL        Primary Care Provider Office Phone # Fax #    SHANIQUE Walsh Ra [facility] [phone]       [facility] [address] [facility] [address]        Thank you!     Thank you for choosing [facility]  for your care. Our goal is always to provide you with excellent care. Hearing back from our patients is one way we can continue to improve our services. Please take a few minutes to complete the written survey that you may receive in the mail after your visit with us. Thank you!             Your Updated Medication List - Protect others around you: Learn how to safely use, store and throw away your medicines at www.disposemymeds.org.          This list is accurate as of: 3/1/17 12:06 PM.  Always use your most recent med list.                   Brand Name Dispense Instructions for use    cyanocobalamin 1000 MCG tablet    vitamin  B-12         folic acid 1 MG tablet    FOLVITE         ketoconazole 2 % shampoo    NIZORAL         levothyroxine 137 MCG tablet    SYNTHROID/LEVOTHROID    90 tablet    Take 1 tablet (137 mcg) by mouth daily

## 2017-03-01 NOTE — PROGRESS NOTES
SUBJECTIVE:                                                    Tricia Sosa is a 76 year old female who presents to clinic today for the following health issues:      Musculoskeletal problem/pain      Duration: 2 weeks    Description  Location: Base of the skull on the left side    Intensity:  moderate    Accompanying signs and symptoms: tingling, swelling in the cheek which she feels is not related. Symptoms happened yesterday and lasted only 1 hour.    History  Previous similar problem: YES- with her cheek  Previous evaluation:  none    Precipitating or alleviating factors:  Trauma or overuse: no   Aggravating factors include: none    Therapies tried and outcome: nothing       Abnormal Mood Symptoms      Duration:  recently passed away    Description:  Depression: YES- Unsure, grief  Anxiety: no   Panic attacks: no      Accompanying signs and symptoms: see PHQ-9 and LLOYD scores    History (similar episodes/previous evaluation): None    Precipitating or alleviating factors: None    Therapies tried and outcome: none       Problem list and histories reviewed & adjusted, as indicated.  Additional history: as documented    Symptoms for years.  Pain at base of skull L side.  Pain with certain movements of neck.  No radiation down arm.     recently passed away.  Quicker than expected.  She is grieving.  Has support from sisters.  Not interested in counseling.  Some difficulty with sleep.  Does not feel she is depressed.    Patient Active Problem List   Diagnosis     Traction detachment of retina     Hypothyroidism     CVID (common variable immunodeficiency) (H)     Hemolytic anemia (H)     B12 deficiency     CARDIOVASCULAR SCREENING; LDL GOAL LESS THAN 160     Past Surgical History   Procedure Laterality Date     Upper gi endoscopy,biopsy  10/01/01      laparoscopy, surgical; cholecystectomy  1992     Ligation of hemorrhoid(s)       Hemorrhoidectomy     C thyroidectomy         cystoscopy,insert ureteral stent       Ureteral stent insertion     C ligate fallopian tube       Hc colonoscopy w biopsy  00     Hc flex sigmoidoscopy w biopsy  00     Hc colonoscopy w biopsy  10/8/03     Hc colonoscopy w biopsy  05     REPEAT IN 5 YEARS     Colonoscopy N/A 2016     Procedure: COLONOSCOPY;  Surgeon: Dontae Del Rio MD;  Location:  GI       Social History   Substance Use Topics     Smoking status: Never Smoker     Smokeless tobacco: Never Used     Alcohol use No     Family History   Problem Relation Age of Onset     CANCER Mother       of colon cancer at age 90     CEREBROVASCULAR DISEASE Father       at age 85     Colon Cancer No family hx of            Reviewed and updated as needed this visit by clinical staff  Tobacco  Allergies  Meds  Med Hx  Surg Hx  Fam Hx  Soc Hx      Reviewed and updated as needed this visit by Provider         ROS:  SEE HPI.    OBJECTIVE:                                                    /72  Pulse 82  Temp 98.1  F (36.7  C) (Oral)  Ht 6' (1.829 m)  Wt 148 lb 8 oz (67.4 kg)  SpO2 100%  BMI 20.14 kg/m2  Body mass index is 20.14 kg/(m^2).  GENERAL: healthy, alert and no distress  NECK: no asymmetry, masses, or scars and L side trapezius mildly ttp.  RESP: lungs clear to auscultation - no rales, rhonchi or wheezes  CV: regular rate and rhythm, normal S1 S2, no S3 or S4, no murmur, click or rub, no peripheral edema and peripheral pulses strong  PSYCH: mentation appears normal, affect normal/bright    Diagnostic Test Results:  none      ASSESSMENT/PLAN:                                                    ASSESSMENT/PLAN:  (M54.2) Cervicalgia  (primary encounter diagnosis)  Comment: Present for years.    Plan: [facility] PT, HAND, AND CHIROPRACTIC REFERRAL        Trial PT.    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  See below.    (D83.9) CVID (common variable immunodeficiency) (H)  Comment: Following with Dr. Summers.    Labs and  appt in 3 months.    Monitor sleep, does not wish for any additional treatment at this time.  Will call if she changes her mind in the future, consider low dose ssri.          SHANIQUE Walsh Ra, CNP  [facility]

## 2017-03-01 NOTE — PATIENT INSTRUCTIONS
Physical therapy will call you to schedule an appointment.    If you decide you would like a medication to help with sleep please call me.

## 2017-03-07 ENCOUNTER — THERAPY VISIT (OUTPATIENT)
Dept: PHYSICAL THERAPY | Facility: CLINIC | Age: 77
End: 2017-03-07
Payer: MEDICARE

## 2017-03-07 DIAGNOSIS — M54.2 CERVICALGIA: Primary | ICD-10-CM

## 2017-03-07 PROCEDURE — G8981 BODY POS CURRENT STATUS: HCPCS | Mod: GP | Performed by: PHYSICAL THERAPIST

## 2017-03-07 PROCEDURE — G8982 BODY POS GOAL STATUS: HCPCS | Mod: GP | Performed by: PHYSICAL THERAPIST

## 2017-03-07 PROCEDURE — 97161 PT EVAL LOW COMPLEX 20 MIN: CPT | Mod: GP | Performed by: PHYSICAL THERAPIST

## 2017-03-07 PROCEDURE — 97110 THERAPEUTIC EXERCISES: CPT | Mod: GP | Performed by: PHYSICAL THERAPIST

## 2017-03-07 PROCEDURE — 97112 NEUROMUSCULAR REEDUCATION: CPT | Mod: GP | Performed by: PHYSICAL THERAPIST

## 2017-03-07 NOTE — PROGRESS NOTES
Subjective:    HPI Comments: Goal for PT is to get rid of the pain in neck or get it back to just occasional pain    Tricia Sosa is a 76 year old female with a cervical spine condition.  Condition occurred with:  Insidious onset.  Condition occurred: for unknown reasons.  This is a chronic condition  Pain off and on for years but recently been getting worse so saw MD for this 3/1/17.    Patient reports pain:  Cervical left side.  Radiates to:  Shoulder left.  Pain is described as aching and is intermittent and reported as 2/10.   Pain is the same all the time.  Symptoms are exacerbated by rotating head, driving, looking up or down and certain positions Relieved by: massage.  Since onset symptoms are gradually worsening.        General health as reported by patient is good.  Pertinent medical history includes:  Thyroid problems and anemia (CVID).  Medical allergies: no.  Other surgeries include:  No.  Current medications:  Thyroid medication and other.  Current occupation is retired.        Barriers include:  None as reported by the patient.    Red flags:  None as reported by the patient.                      Objective:    System    Physical Exam    Wally Cervical Evaluation    Posture:  Sitting: fair  Standing: fair  Protruding Head: yes  Wry Neck: no      Movement Loss:  Protrusion (PRO): nil  Flexion (Flex): mod  Retraction (RET): mod  Extension (EXT): mod  Lateral Flexion Right (LF R): major  Lateral Flexion Left (LF L): major  Rotation Right (ROT R): mod  Rotation Left (ROT L): mod  Test Movements:      RET: During: produces  After: no worse  Mechanical Response: no effect  Repeat RET: During: produces  After: no worse  Mechanical Response: no effect  RET EXT: During: produces  After: no worse  Mechanical Response: no effect  Repeat RET EXT: During: produces  After: no worse  Mechanical Response: IncROM                                                                   ROS    Assessment/Plan:      Patient  is a 76 year old female with cervical complaints.    Patient has the following significant findings with corresponding treatment plan.                Diagnosis 1:  Neck pain  Pain -  manual therapy, education, directional preference exercise and home program  Decreased ROM/flexibility - manual therapy, therapeutic exercise and home program  Decreased function - therapeutic activities and home program  Impaired posture - neuro re-education and home program    Therapy Evaluation Codes:   1) History comprised of:   Personal factors that impact the plan of care:      Past/current experiences and Time since onset of symptoms.    Comorbidity factors that impact the plan of care are:      None.     Medications impacting care: None.  2) Examination of Body Systems comprised of:   Body structures and functions that impact the plan of care:      Cervical spine.   Activity limitations that impact the plan of care are:      Driving, Lifting, Reading/Computer work and Sleeping.  3) Clinical presentation characteristics are:   Stable/Uncomplicated.  4) Decision-Making    Low complexity using standardized patient assessment instrument and/or measureable assessment of functional outcome.  Cumulative Therapy Evaluation is: Low complexity.    Previous and current functional limitations:  (See Goal Flow Sheet for this information)    Short term and Long term goals: (See Goal Flow Sheet for this information)     Communication ability:  Patient appears to be able to clearly communicate and understand verbal and written communication and follow directions correctly.  Treatment Explanation - The following has been discussed with the patient:   RX ordered/plan of care  Anticipated outcomes  Possible risks and side effects  This patient would benefit from PT intervention to resume normal activities.   Rehab potential is good.    Frequency:  2 X a month, once daily  Duration:  for 2 months  Discharge Plan:  Achieve all LTG.  Independent in  home treatment program.  Reach maximal therapeutic benefit.    Please refer to the daily flowsheet for treatment today, total treatment time and time spent performing 1:1 timed codes.

## 2017-03-07 NOTE — MR AVS SNAPSHOT
After Visit Summary   3/7/2017    Tricia Sosa    MRN: [id number]           Patient Information     Date Of Birth          1940        Visit Information        Provider Department      3/7/2017 11:50 AM Estela Rocha, PT [facility] [place] [facility]        Today's Diagnoses     Cervicalgia    -  1       Follow-ups after your visit        Your next 10 appointments already scheduled     Mar 10, 2017 10:30 AM CST   Level 5 with RH INFUSION CHAIR 5   [facility] ([facility])    [facility]  [address]  [place] [address]   [phone]            Mar 17, 2017  9:40 AM CDT   BRENDA Spine with Adolfo Vasquez, PT   [facility] [place] [facility] ([facility] [place])    [address]  [place] [address]   [phone]            Mar 24, 2017 10:20 AM CDT   BRENDA Spine with Estela Rocha, PT   [facility] [place] [facility] ([facility] [place])    [address]  [place] [address]   [phone]            Apr 07, 2017 10:30 AM CDT   Level 5 with RH INFUSION CHAIR 2   [facility] ([facility])    [facility]  [address]  [place] [address]   [phone]            Apr 07, 2017 10:45 AM CDT   Return Visit with Jose Summers MD   [facility] ([facility])    [facility]  [address]  [place] [address]   [phone]            May 05, 2017 10:30 AM CDT   Level 5 with RH INFUSION CHAIR 5   [facility] ([facility])    [facility]  [address]  [place] [address]   [phone]            Jun 02, 2017 10:30 AM CDT   Level 5 with RH INFUSION CHAIR 5   [facility] ([place]  [facility])    [facility]  [address]  [facility] [id number] [phone]              Who to contact     If you have questions or need follow up information about today's clinic visit or your schedule please contact [facility] directly at [phone].  Normal or non-critical lab and imaging results will be communicated to you by MyChart, letter or phone within 4 business days after the clinic has received the results. If you do not hear from us within 7 days, please contact the clinic through [employer] or phone. If you have a critical or abnormal lab result, we will notify you by phone as soon as possible.  Submit refill requests through [employer] or call your pharmacy and they will forward the refill request to us. Please allow 3 business days for your refill to be completed.          Additional Information About Your Visit        [employer] Information     [employer] gives you secure access to your electronic health record. If you see a primary care provider, you can also send messages to your care team and make appointments. If you have questions, please call your primary care clinic.  If you do not have a primary care provider, please call [phone] and they will assist you.        Care EveryWhere ID     This is your Care EveryWhere ID. This could be used by other organizations to access your [place] medical records  [id number]         Blood Pressure from Last 3 Encounters:   03/01/17 138/72   02/14/17 128/75   02/08/17 135/68    Weight from Last 3 Encounters:   03/01/17 67.4 kg (148 lb 8 oz)   02/14/17 66.2 kg (146 lb)   02/08/17 66.3 kg (146 lb 1.6 oz)              We Performed the Following     HC PT EVAL, LOW COMPLEXITY     BRENDA CERT REPORT     BRENDA INITIAL EVAL REPORT     NEUROMUSCULAR RE-EDUCATION     THERAPEUTIC EXERCISES        Primary Care Provider Office Phone # Fax #    SHANIQUE Walsh Ra, [phone]        [facility] [address]  [facility] [address]        Thank you!     Thank you for choosing [facility]  for your care. Our goal is always to provide you with excellent care. Hearing back from our patients is one way we can continue to improve our services. Please take a few minutes to complete the written survey that you may receive in the mail after your visit with us. Thank you!             Your Updated Medication List - Protect others around you: Learn how to safely use, store and throw away your medicines at www.disposemymeds.org.          This list is accurate as of: 3/7/17 12:58 PM.  Always use your most recent med list.                   Brand Name Dispense Instructions for use    cyanocobalamin 1000 MCG tablet    vitamin  B-12         folic acid 1 MG tablet    FOLVITE         ketoconazole 2 % shampoo    NIZORAL         levothyroxine 137 MCG tablet    SYNTHROID/LEVOTHROID    90 tablet    Take 1 tablet (137 mcg) by mouth daily

## 2017-03-07 NOTE — LETTER
DEPARTMENT OF HEALTH AND HUMAN SERVICES  CENTERS FOR MEDICARE & MEDICAID SERVICES    PLAN/UPDATED PLAN OF PROGRESS FOR OUTPATIENT REHABILITATION    PATIENTS NAME:  Tricia Sosa   : 1940  PROVIDER NUMBER:    [id number]  [facility]  [id number]   PROVIDER NAME: [facility]  MEDICAL RECORD NUMBER: [id number]   START OF CARE DATE:  SOC Date: 17   TYPE:  PT  PRIMARY/TREATMENT DIAGNOSIS: (Pertinent Medical Diagnosis)  Cervicalgia  VISITS FROM START OF CARE:  Rxs Used: 1     Subjective:  HPI Comments: Goal for PT is to get rid of the pain in neck or get it back to just occasional pain  Tricia Sosa is a 76 year old female with a cervical spine condition.  Condition occurred with:  Insidious onset.  Condition occurred: for unknown reasons.  This is a chronic condition  Pain off and on for years but recently been getting worse so saw MD for this 3/1/17.    Patient reports pain:  Cervical left side.  Radiates to:  Shoulder left.  Pain is described as aching and is intermittent and reported as 2/10.   Pain is the same all the time.  Symptoms are exacerbated by rotating head, driving, looking up or down and certain positions Relieved by: massage.  Since onset symptoms are gradually worsening.        General health as reported by patient is good.  Pertinent medical history includes:  Thyroid problems and anemia (CVID).  Medical allergies: no.  Other surgeries include:  No.  Current medications:  Thyroid medication and other.  Current occupation is retired.      Barriers include:  None as reported by the patient.  Red flags:  None as reported by the patient.    Objective:  System  Physical Exam  Wally Cervical Evaluation  Posture:  Sitting: fair  Standing: fair  Protruding Head: yes  Wry Neck: no  Movement Loss:  Protrusion (PRO): nil  Flexion (Flex): mod  Retraction (RET): mod  Extension (EXT): mod  Lateral Flexion Right (LF R): major  Lateral Flexion Left (LF L): major  Rotation  Right (ROT R): mod  Rotation Left (ROT L): mod  Test Movements:  RET: During: produces  After: no worse  Mechanical Response: no effect  Repeat RET: During: produces  After: no worse  Mechanical Response: no effect  RET EXT: During: produces  After: no worse  Mechanical Response: no effect  Repeat RET EXT: During: produces  After: no worse  Mechanical Response: IncROM  PATIENTS NAME:  Tricia Sosa   : 1940    Assessment/Plan:    Patient is a 76 year old female with cervical complaints.    Patient has the following significant findings with corresponding treatment plan.                Diagnosis 1:  Neck pain  Pain -  manual therapy, education, directional preference exercise and home program  Decreased ROM/flexibility - manual therapy, therapeutic exercise and home program  Decreased function - therapeutic activities and home program  Impaired posture - neuro re-education and home program    Therapy Evaluation Codes:   1) History comprised of:   Personal factors that impact the plan of care:      Past/current experiences and Time since onset of symptoms.    Comorbidity factors that impact the plan of care are:      None.     Medications impacting care: None.  2) Examination of Body Systems comprised of:   Body structures and functions that impact the plan of care:      Cervical spine.   Activity limitations that impact the plan of care are:      Driving, Lifting, Reading/Computer work and Sleeping.  3) Clinical presentation characteristics are:   Stable/Uncomplicated.  4) Decision-Making    Low complexity using standardized patient assessment instrument and/or measureable assessment of functional outcome.  Cumulative Therapy Evaluation is: Low complexity.    Previous and current functional limitations:  (See Goal Flow Sheet for this information)    Short term and Long term goals: (See Goal Flow Sheet for this information)     Communication ability:  Patient appears to be able to clearly communicate and  "understand verbal and written communication and follow directions correctly.  Treatment Explanation - The following has been discussed with the patient:   RX ordered/plan of care  Anticipated outcomes  Possible risks and side effects  This patient would benefit from PT intervention to resume normal activities.   Rehab potential is good.    Frequency:  2 X a month, once daily  Duration:  for 2 months  Discharge Plan:  Achieve all LTG.  Independent in home treatment program.  Reach maximal therapeutic benefit.                  PATIENTS NAME:  Tricia Sosa   : 1940        Caregiver Signature/Credentials _____________________________ Date ________       Treating Provider: Estela Rocha PT   I have reviewed and certified the need for these services and plan of treatment while under my care.        PHYSICIAN'S SIGNATURE:   _________________________________________  Date___________   Monika Whipple MD    Certification period:  Beginning of Cert date period: 17 to  End of Cert period date: 17     Functional Level Progress Report: Please see attached \"Goal Flow sheet for Functional level.\"    ____X____ Continue Services or       ________ DC Services                Service dates: From  SOC Date: 17 date to present                         "

## 2017-03-10 ENCOUNTER — INFUSION THERAPY VISIT (OUTPATIENT)
Dept: INFUSION THERAPY | Facility: CLINIC | Age: 77
End: 2017-03-10
Attending: ALLERGY & IMMUNOLOGY
Payer: MEDICARE

## 2017-03-10 VITALS
HEART RATE: 73 BPM | SYSTOLIC BLOOD PRESSURE: 114 MMHG | TEMPERATURE: 96.1 F | OXYGEN SATURATION: 100 % | DIASTOLIC BLOOD PRESSURE: 69 MMHG

## 2017-03-10 DIAGNOSIS — D83.9 CVID (COMMON VARIABLE IMMUNODEFICIENCY) (H): Primary | ICD-10-CM

## 2017-03-10 PROCEDURE — 96366 THER/PROPH/DIAG IV INF ADDON: CPT

## 2017-03-10 PROCEDURE — 96375 TX/PRO/DX INJ NEW DRUG ADDON: CPT

## 2017-03-10 PROCEDURE — 25000128 H RX IP 250 OP 636: Performed by: INTERNAL MEDICINE

## 2017-03-10 PROCEDURE — 96365 THER/PROPH/DIAG IV INF INIT: CPT

## 2017-03-10 RX ORDER — DIPHENHYDRAMINE HCL 25 MG
25 CAPSULE ORAL
Status: CANCELLED | OUTPATIENT
Start: 2017-03-10

## 2017-03-10 RX ORDER — ACETAMINOPHEN 325 MG/1
650 TABLET ORAL
Status: CANCELLED
Start: 2017-03-10

## 2017-03-10 RX ADMIN — HUMAN IMMUNOGLOBULIN G 35 G: 20 LIQUID INTRAVENOUS at 11:17

## 2017-03-10 RX ADMIN — SODIUM CHLORIDE 250 ML: 9 INJECTION, SOLUTION INTRAVENOUS at 11:16

## 2017-03-10 RX ADMIN — HYDROCORTISONE SODIUM SUCCINATE 100 MG: 100 INJECTION, POWDER, FOR SOLUTION INTRAMUSCULAR; INTRAVENOUS at 11:13

## 2017-03-10 NOTE — PROGRESS NOTES
"Infusion Nursing Note:  Tricia Sosa presents today for IVIG.  Solu sortef given, no tylenol, no benadryl  Patient seen by provider today: No   present during visit today: Not Applicable.    Note: N/A.    Intravenous Access:  Peripheral IV placed.    Treatment Conditions:  Rheumatology Infusion Checklist: PRIOR TO INFUSION OF BIOLOGICAL MEDICATIONS OR ANY OF THESE AS LISTED: Immune Globulin (IVIG) \".rheumbiologicalchecklist\"    Prior to Infusion of biological medications or any of these as listed:    1. Elevated temperature, fever, chills, productive cough or abnormal vital signs, night sweats, coughing up blood or sputum, no appetite or abnormal vital signs : NO    2. Open wounds or new incisions: NO    3. Recent hospitalization: NO    4.  Recent surgeries:  NO    5. Any upcoming surgeries or dental procedures?:NO    6. Any current or recent bouts of illness or infection? On any antibiotics? : NO    7. Any new, sudden or worsening abdominal pain :NO    8. Vaccination within 4 weeks? Patient or someone in the household is scheduled to receive vaccination? No live virus vaccines prior to or during treatment     9. Any nervous system diseases [i.e. multiple sclerosis, Guillain-Schulenburg, seizures, neurological  changes]: NO    10. Pregnant or breast feeding; or plans on pregnancy in the future: NO    11. Signs of worsening depression or suicidal ideations while taking benlysta:NO    12. New-onset medical symptoms: NO    13.  New cancer diagnosis or on chemotherapy or radiation NO    14.  Evaluate for any sign of active TB [Unexplained weight loss, Loss of appetite, Night sweats, Fever, Fatigue, Chills, Coughing for 3 weeks or longer, Hemoptysis (coughing up blood), Chest pain]: NO    **Note: If answered yes to any of the above, hold the infusion and contact ordering rheumatologist or on-call rheumatologist.   Infusion initiated @ 0.5 mg/kg/hr x 15 min  incr to 1 mg/kg/hr x 15 min  incr to 1.5 mg/kg/hr x 15 " min  incr to 2 mg/kg/hr x 15 min  incr to 2.5 mg/kg/hr x 15 min  incr to 3 mg/kg/hr x 15 min  incr to 3.5 mg/kg/hr x 15 min  incr to 4 mg/kg/hr to completion        Post Infusion Assessment:  Patient tolerated infusion without incident.  Site patent and intact, free from redness, edema or discomfort.  No evidence of extravasations.  Access discontinued per protocol.  Rheumatology Post Infusion: POST-INFUSION OF BIOLOGICAL MEDICATION:    Reviewed with patient.  Given biologic medication or medication hand-out. Inform patient if any fever, chills or signs of infection, new symptoms, abdominal pain, heart palpitations, shortness of breath, reaction, weakness, neurological changes, seek medical attention immediately and should not receive infusions. No live virus vaccines prior to or during treatment or up to 6 months post infusion. If the patient has an upcoming procedure or surgery, this should be discussed with the rheumatologist and surgeon or provider..    Discharge Plan:   Discharge instructions reviewed with: Patient.  Copy of AVS reviewed with patient and/or family.  Patient will return 4/7 for next appointment.  Patient discharged in stable condition accompanied by: self.  Departure Mode: Ambulatory.    Serina Otto RN

## 2017-03-10 NOTE — MR AVS SNAPSHOT
After Visit Summary   3/10/2017    Tricia Sosa    MRN: [id number]           Patient Information     Date Of Birth          1940        Visit Information        Provider Department      3/10/2017 10:30 AM RH INFUSION CHAIR 5 [facility]        Today's Diagnoses     CVID (common variable immunodeficiency) (H)    -  1       Follow-ups after your visit        Your next 10 appointments already scheduled     Mar 17, 2017  9:40 AM CDT   BRENDA Spine with Adolfo Vasquez, PT   [facility] [place] [facility] ([facility] [place])    [address]  [place] [address]   [phone]            Mar 24, 2017 10:20 AM CDT   BRENDA Spine with Estela Rocha PT   [facility] [place] [facility] ([facility] [place])    [address]  [place] [address]   [phone]            Apr 07, 2017 10:30 AM CDT   Level 5 with RH INFUSION CHAIR 2   [facility] ([facility])    [facility] [place] [address]  [place] [address]   [phone]            Apr 07, 2017 10:45 AM CDT   Return Visit with Jose Summers MD   [facility] ([facility])    [facility]  [address]  [place] [address]   [phone]            May 05, 2017 10:30 AM CDT   Level 5 with RH INFUSION CHAIR 5   [facility] ([facility])    [facility]  [address]  [place] [address]   [phone]            Jun 02, 2017 10:30 AM CDT   Level 5 with RH INFUSION CHAIR 5   [facility] ([facility])    [facility]  [address]  [place] [address]   [phone]              Who to contact     If you have questions or need follow up information about today's  clinic visit or your schedule please contact [facility] directly at [phone].  Normal or non-critical lab and imaging results will be communicated to you by MyChart, letter or phone within 4 business days after the clinic has received the results. If you do not hear from us within 7 days, please contact the clinic through [employer] or phone. If you have a critical or abnormal lab result, we will notify you by phone as soon as possible.  Submit refill requests through [employer] or call your pharmacy and they will forward the refill request to us. Please allow 3 business days for your refill to be completed.          Additional Information About Your Visit        [employer] Information     [employer] gives you secure access to your electronic health record. If you see a primary care provider, you can also send messages to your care team and make appointments. If you have questions, please call your primary care clinic.  If you do not have a primary care provider, please call [phone] and they will assist you.        Care EveryWhere ID     This is your Care EveryWhere ID. This could be used by other organizations to access your [place] medical records  [id number]        Your Vitals Were     Pulse Temperature Pulse Oximetry             73 96.1  F (35.6  C) (Tympanic) 100%          Blood Pressure from Last 3 Encounters:   03/10/17 114/69   03/01/17 138/72   02/14/17 128/75    Weight from Last 3 Encounters:   03/01/17 67.4 kg (148 lb 8 oz)   02/14/17 66.2 kg (146 lb)   02/08/17 66.3 kg (146 lb 1.6 oz)              We Performed the Following     Nursing Communication 1     Treatment Conditions     Vital signs        Primary Care Provider Office Phone # Fax #    SHANIQUE Walsh Ra, [phone]       [facility] [place] [address]  [facility] [address]        Thank you!     Thank you for choosing [facility]  for your care.  Our goal is always to provide you with excellent care. Hearing back from our patients is one way we can continue to improve our services. Please take a few minutes to complete the written survey that you may receive in the mail after your visit with us. Thank you!             Your Updated Medication List - Protect others around you: Learn how to safely use, store and throw away your medicines at www.disposemymeds.org.          This list is accurate as of: 3/10/17  2:25 PM.  Always use your most recent med list.                   Brand Name Dispense Instructions for use    cyanocobalamin 1000 MCG tablet    vitamin  B-12         folic acid 1 MG tablet    FOLVITE         ketoconazole 2 % shampoo    NIZORAL         levothyroxine 137 MCG tablet    SYNTHROID/LEVOTHROID    90 tablet    Take 1 tablet (137 mcg) by mouth daily

## 2017-03-20 DIAGNOSIS — E03.9 HYPOTHYROIDISM: ICD-10-CM

## 2017-03-20 NOTE — TELEPHONE ENCOUNTER
levothyroxine (SYNTHROID, LEVOTHROID) 137 MCG tablet     Last Written Prescription Date: 9/21/16  Last Quantity: 90, # refills: 1  Last Office Visit with ANASTASIA, KASSIDY or [facility] prescribing provider: 3/1/17   Next 5 appointments (look out 90 days)     Apr 07, 2017 10:45 AM CDT   Return Visit with Jose Summers MD   [facility] ([facility])    [facility]  [address]  [facility] [address] [phone]                   TSH   Date Value Ref Range Status   03/10/2016 0.7 mcU/mL Final

## 2017-03-21 RX ORDER — LEVOTHYROXINE SODIUM 137 UG/1
137 TABLET ORAL DAILY
Qty: 30 TABLET | Refills: 0 | Status: SHIPPED | OUTPATIENT
Start: 2017-03-21 | End: 2017-04-11

## 2017-03-21 NOTE — TELEPHONE ENCOUNTER
Medication is being filled for 1 time refill only due to:  Patient needs labs TSH.     Disha Cooley RN

## 2017-03-22 NOTE — TELEPHONE ENCOUNTER
Attempted to call patient--no voicemail available, refill approved, needs office visit for further refills.  Bernarda Arizmendi CMA ([facility]) 3/22/2017 2:47 PM

## 2017-03-24 ENCOUNTER — THERAPY VISIT (OUTPATIENT)
Dept: PHYSICAL THERAPY | Facility: CLINIC | Age: 77
End: 2017-03-24
Payer: MEDICARE

## 2017-03-24 DIAGNOSIS — M54.2 CERVICALGIA: ICD-10-CM

## 2017-03-24 PROCEDURE — 97112 NEUROMUSCULAR REEDUCATION: CPT | Mod: GP | Performed by: PHYSICAL THERAPIST

## 2017-03-24 PROCEDURE — 97110 THERAPEUTIC EXERCISES: CPT | Mod: GP | Performed by: PHYSICAL THERAPIST

## 2017-03-24 PROCEDURE — 97140 MANUAL THERAPY 1/> REGIONS: CPT | Mod: GP | Performed by: PHYSICAL THERAPIST

## 2017-04-03 ENCOUNTER — THERAPY VISIT (OUTPATIENT)
Dept: PHYSICAL THERAPY | Facility: CLINIC | Age: 77
End: 2017-04-03
Payer: MEDICARE

## 2017-04-03 DIAGNOSIS — D59.19 OTHER AUTOIMMUNE HEMOLYTIC ANEMIAS: ICD-10-CM

## 2017-04-03 DIAGNOSIS — E03.9 HYPOTHYROIDISM: ICD-10-CM

## 2017-04-03 DIAGNOSIS — D83.9 CVID (COMMON VARIABLE IMMUNODEFICIENCY) (H): ICD-10-CM

## 2017-04-03 DIAGNOSIS — M54.2 CERVICALGIA: ICD-10-CM

## 2017-04-03 LAB
ANISOCYTOSIS BLD QL SMEAR: SLIGHT
DIFFERENTIAL METHOD BLD: ABNORMAL
EOSINOPHIL # BLD AUTO: 0.1 10E9/L (ref 0–0.7)
EOSINOPHIL NFR BLD AUTO: 2 %
ERYTHROCYTE [DISTWIDTH] IN BLOOD BY AUTOMATED COUNT: 15.3 % (ref 10–15)
FERRITIN SERPL-MCNC: 44 NG/ML (ref 8–252)
HCT VFR BLD AUTO: 30.4 % (ref 35–47)
HGB BLD-MCNC: 10.3 G/DL (ref 11.7–15.7)
IRON SATN MFR SERPL: 35 % (ref 15–46)
IRON SERPL-MCNC: 117 UG/DL (ref 35–180)
LDH SERPL L TO P-CCNC: 977 U/L (ref 81–234)
LYMPHOCYTES # BLD AUTO: 0.9 10E9/L (ref 0.8–5.3)
LYMPHOCYTES NFR BLD AUTO: 20 %
MCH RBC QN AUTO: 33.3 PG (ref 26.5–33)
MCHC RBC AUTO-ENTMCNC: 33.9 G/DL (ref 31.5–36.5)
MCV RBC AUTO: 98 FL (ref 78–100)
MONOCYTES # BLD AUTO: 0.4 10E9/L (ref 0–1.3)
MONOCYTES NFR BLD AUTO: 8 %
NEUTROPHILS # BLD AUTO: 3 10E9/L (ref 1.6–8.3)
NEUTROPHILS NFR BLD AUTO: 70 %
PLATELET # BLD AUTO: 148 10E9/L (ref 150–450)
PLATELET # BLD EST: ABNORMAL 10*3/UL
RBC # BLD AUTO: 3.09 10E12/L (ref 3.8–5.2)
RETICS # AUTO: 135.2 10E9/L (ref 25–95)
RETICS/RBC NFR AUTO: 4.4 % (ref 0.5–2)
T4 FREE SERPL-MCNC: 1.09 NG/DL (ref 0.76–1.46)
TIBC SERPL-MCNC: 339 UG/DL (ref 240–430)
TSH SERPL DL<=0.005 MIU/L-ACNC: 6.29 MU/L (ref 0.4–4)
VIT B12 SERPL-MCNC: 648 PG/ML (ref 193–986)
WBC # BLD AUTO: 4.4 10E9/L (ref 4–11)

## 2017-04-03 PROCEDURE — 84439 ASSAY OF FREE THYROXINE: CPT | Performed by: NURSE PRACTITIONER

## 2017-04-03 PROCEDURE — 82784 ASSAY IGA/IGD/IGG/IGM EACH: CPT | Performed by: NURSE PRACTITIONER

## 2017-04-03 PROCEDURE — 99000 SPECIMEN HANDLING OFFICE-LAB: CPT | Performed by: NURSE PRACTITIONER

## 2017-04-03 PROCEDURE — 83010 ASSAY OF HAPTOGLOBIN QUANT: CPT | Performed by: NURSE PRACTITIONER

## 2017-04-03 PROCEDURE — 97112 NEUROMUSCULAR REEDUCATION: CPT | Mod: GP | Performed by: PHYSICAL THERAPIST

## 2017-04-03 PROCEDURE — 80053 COMPREHEN METABOLIC PANEL: CPT | Performed by: NURSE PRACTITIONER

## 2017-04-03 PROCEDURE — 83540 ASSAY OF IRON: CPT | Performed by: NURSE PRACTITIONER

## 2017-04-03 PROCEDURE — 97140 MANUAL THERAPY 1/> REGIONS: CPT | Mod: GP | Performed by: PHYSICAL THERAPIST

## 2017-04-03 PROCEDURE — 84443 ASSAY THYROID STIM HORMONE: CPT | Performed by: NURSE PRACTITIONER

## 2017-04-03 PROCEDURE — 36415 COLL VENOUS BLD VENIPUNCTURE: CPT | Performed by: NURSE PRACTITIONER

## 2017-04-03 PROCEDURE — 85045 AUTOMATED RETICULOCYTE COUNT: CPT | Performed by: NURSE PRACTITIONER

## 2017-04-03 PROCEDURE — 82728 ASSAY OF FERRITIN: CPT | Performed by: NURSE PRACTITIONER

## 2017-04-03 PROCEDURE — 85025 COMPLETE CBC W/AUTO DIFF WBC: CPT | Performed by: NURSE PRACTITIONER

## 2017-04-03 PROCEDURE — 82784 ASSAY IGA/IGD/IGG/IGM EACH: CPT | Mod: 59 | Performed by: NURSE PRACTITIONER

## 2017-04-03 PROCEDURE — 83550 IRON BINDING TEST: CPT | Performed by: NURSE PRACTITIONER

## 2017-04-03 PROCEDURE — 82747 ASSAY OF FOLIC ACID RBC: CPT | Mod: 90 | Performed by: NURSE PRACTITIONER

## 2017-04-03 PROCEDURE — 82607 VITAMIN B-12: CPT | Performed by: NURSE PRACTITIONER

## 2017-04-03 PROCEDURE — 85060 BLOOD SMEAR INTERPRETATION: CPT | Performed by: INTERNAL MEDICINE

## 2017-04-03 PROCEDURE — 83615 LACTATE (LD) (LDH) ENZYME: CPT | Performed by: NURSE PRACTITIONER

## 2017-04-03 PROCEDURE — 97110 THERAPEUTIC EXERCISES: CPT | Mod: GP | Performed by: PHYSICAL THERAPIST

## 2017-04-04 LAB
COPATH REPORT: NORMAL
HAPTOGLOB SERPL-MCNC: <6 MG/DL (ref 35–175)
IGA SERPL-MCNC: <7 MG/DL (ref 70–380)
IGG SERPL-MCNC: 773 MG/DL (ref 695–1620)
IGM SERPL-MCNC: 133 MG/DL (ref 60–265)

## 2017-04-05 LAB
FOLATE RBC-MCNC: NORMAL NG/ML
HCT VFR BLD CALC: 30.4 %

## 2017-04-07 ENCOUNTER — ONCOLOGY VISIT (OUTPATIENT)
Dept: ONCOLOGY | Facility: CLINIC | Age: 77
End: 2017-04-07
Attending: INTERNAL MEDICINE
Payer: MEDICARE

## 2017-04-07 ENCOUNTER — INFUSION THERAPY VISIT (OUTPATIENT)
Dept: INFUSION THERAPY | Facility: CLINIC | Age: 77
End: 2017-04-07
Attending: ALLERGY & IMMUNOLOGY
Payer: MEDICARE

## 2017-04-07 VITALS
OXYGEN SATURATION: 99 % | HEART RATE: 69 BPM | RESPIRATION RATE: 16 BRPM | TEMPERATURE: 97.6 F | DIASTOLIC BLOOD PRESSURE: 76 MMHG | WEIGHT: 148.81 LBS | SYSTOLIC BLOOD PRESSURE: 133 MMHG | BODY MASS INDEX: 20.18 KG/M2

## 2017-04-07 VITALS
HEART RATE: 69 BPM | BODY MASS INDEX: 20.16 KG/M2 | SYSTOLIC BLOOD PRESSURE: 133 MMHG | HEIGHT: 72 IN | RESPIRATION RATE: 16 BRPM | WEIGHT: 148.81 LBS | DIASTOLIC BLOOD PRESSURE: 76 MMHG | TEMPERATURE: 97.6 F | OXYGEN SATURATION: 99 %

## 2017-04-07 DIAGNOSIS — D83.9 CVID (COMMON VARIABLE IMMUNODEFICIENCY) (H): Primary | ICD-10-CM

## 2017-04-07 DIAGNOSIS — D59.19 OTHER AUTOIMMUNE HEMOLYTIC ANEMIAS: ICD-10-CM

## 2017-04-07 PROCEDURE — 96365 THER/PROPH/DIAG IV INF INIT: CPT

## 2017-04-07 PROCEDURE — 96375 TX/PRO/DX INJ NEW DRUG ADDON: CPT

## 2017-04-07 PROCEDURE — 96366 THER/PROPH/DIAG IV INF ADDON: CPT

## 2017-04-07 PROCEDURE — 25000128 H RX IP 250 OP 636: Performed by: INTERNAL MEDICINE

## 2017-04-07 PROCEDURE — 99213 OFFICE O/P EST LOW 20 MIN: CPT | Performed by: INTERNAL MEDICINE

## 2017-04-07 RX ORDER — ACETAMINOPHEN 325 MG/1
650 TABLET ORAL
Status: CANCELLED
Start: 2017-04-07

## 2017-04-07 RX ORDER — DIPHENHYDRAMINE HCL 25 MG
25 CAPSULE ORAL
Status: CANCELLED | OUTPATIENT
Start: 2017-04-07

## 2017-04-07 RX ADMIN — HYDROCORTISONE SODIUM SUCCINATE 100 MG: 100 INJECTION, POWDER, FOR SOLUTION INTRAMUSCULAR; INTRAVENOUS at 11:04

## 2017-04-07 RX ADMIN — HUMAN IMMUNOGLOBULIN G 35 G: 20 LIQUID INTRAVENOUS at 11:17

## 2017-04-07 RX ADMIN — SODIUM CHLORIDE 250 ML: 9 INJECTION, SOLUTION INTRAVENOUS at 11:03

## 2017-04-07 NOTE — PROGRESS NOTES
[facility]ER CLINIC  FOLLOW-UP VISIT NOTE    PATIENT NAME: Tricia Sosa MRN # [id number]  DATE OF VISIT: Apr 7, 2017 YOB: 1940    REFERRING PROVIDER: No referring provider defined for this encounter.    DIAGNOSIS: Hemolytic anemia-autoimmune; IgA deficiency    TREATMENT SUMMARY:  Tricia has been diagnosed with IgA deficiency since her around 2000.  She was very sick at the time and had severe diarrhea.  She lost about 40 pounds in weight.  The workup revealed that she had markedly diminished CD4 counts of 48 and was diagnosed with CMV colitis.  Since then she has been followed in hematology clinic at [place] until recently.  She was noted to have anemia which was fairly long-standing.  She was initially referred for anemia in 2004 and also had a bone marrow biopsy done at that time which revealed hypercellular bone marrow with 70-80% cellularity and normal trilineage hematopoiesis and no morphologic features of MDS or lymphoproliferation.  Her anemia was attributed to her chronic underlying disease.  At the next evaluation in 2002 on 4 aggressive anemia there was no evidence of hemolysis, iron deficiency, B12 or folate deficiency.  Bone marrow biopsy was not pursued.  In summer of 2015 she had progressive fatigue, dyspnea on exertion and worsening anemia with a hemoglobin now of 9.  Workup at this time did suggest a hemolytic anemia with elevated LDH at 709, undetectable haptoglobin and elevated unconjugated bilirubin at 3.3.  Monospecific MARIKA was positive for both IgG and complement.  Cold agglutinin screen was positive with very low titer 1:64.  She was started on prednisone 60 mg daily with supplementation of iron folate and B12 starting 7/31/15.  Her prednisone has been slowly tapered and was discontinued off in January 2016.  She was last followed at [facility] on March 10 when she had stable hemoglobin.      SUBJECTIVE   Tricia comes alone for this clinic visit for  her hemolytic anemia.    Tricia is doing well overall. She has no new complains. She is here with labs done prior to clinic visit.       PAST MEDICAL HISTORY   1. Common variable immunodeficiency syndrome  2. 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 Selective IgA deficiency  4. Leukopenia with markedly low CD4 counts on Bactrim prophylaxis  5. History of fall with subdural hematoma in 6/11/14 with complete resolution  6. Hashimoto's disease status post partial thyroidectomy      CURRENT OUTPATIENT MEDICATIONS     Current Outpatient Prescriptions   Medication Sig     levothyroxine (SYNTHROID/LEVOTHROID) 137 MCG tablet Take 1 tablet (137 mcg) by mouth daily     ketoconazole (NIZORAL) 2 % shampoo      cyanocobalamin (VITAMIN  B-12) 1000 MCG tablet      folic acid (FOLVITE) 1 MG tablet      No current facility-administered medications for this visit.      Facility-Administered Medications Ordered in Other Visits   Medication     0.9% sodium chloride BOLUS     hydrocortisone sodium succinate PF (Solu-CORTEF) injection 100 mg     immune globulin - sucrose free 10 % injection 35 g     INFUSION HYPERSENSITIVITY        ALLERGIES     Allergies   Allergen Reactions     Doxycycline         REVIEW OF SYSTEMS   As above in the HPI, o/w complete 12-point ROS was negative.     PHYSICAL EXAM   /76  Pulse 69  Temp 97.6  F (36.4  C) (Tympanic)  Resp 16  Ht 1.829 m (6')  Wt 67.5 kg (148 lb 13 oz)  SpO2 99%  BMI 20.18 kg/m2    SpO2 Readings from Last 4 Encounters:   03/10/17 100%   03/01/17 100%   01/11/17 98%   12/06/16 98%     Wt Readings from Last 3 Encounters:   03/01/17 67.4 kg (148 lb 8 oz)   02/14/17 66.2 kg (146 lb)   02/08/17 66.3 kg (146 lb 1.6 oz)     GEN: NAD  HEENT: PERRL, EOMI, no icterus, injection or pallor. Oropharynx is clear.  NECK: no cervical or supraclavicular lymphadenopathy  LUNGS: clear bilaterally  CV: regular, no murmurs, rubs, or gallops  ABDOMEN:  soft, non-tender, non-distended, normal bowel sounds, no hepatosplenomegaly by percussion or palpation  EXT: warm, well perfused, no edema  NEURO: alert  SKIN: no rashes   LABORATORY AND IMAGING STUDIES     Recent Labs   Lab Test  02/06/17   1351  11/28/16   0919  11/02/16   1520  08/22/16   0901  06/06/16   1001   NA  142  142  140  142  143   POTASSIUM  3.9  4.3  3.9  4.3  3.9   CHLORIDE  104  106  106  108  109   CO2  29  28  28  28  30   ANIONGAP  9  8  6  6  4   BUN  14  11  18  11  9   CR  0.74  0.75  0.84  0.71  0.70   GLC  83  88  117*  91  104*   CULLEN  8.4*  9.0  8.6  8.6  8.6     No results for input(s): MAG, PHOS in the last 11439 hours.  Recent Labs   Lab Test  04/03/17   1132  02/06/17   1351  11/28/16   0919  11/07/16   0904  11/02/16   1520  08/22/16   0901   WBC  4.4  5.4  5.2  4.6  6.2  4.3   HGB  10.3*  10.1*  10.6*  10.4*  10.3*  11.5*   PLT  148*  178  203  204  222  183   MCV  98  100  100  98  96  97   NEUTROPHIL  70.0  76.0  68.0  62.0   --   59.0     Recent Labs   Lab Test  04/03/17   1132  02/06/17   1351  11/28/16   0919   11/02/16   1520   BILITOTAL   --   2.0*  2.3*   --   1.8*   ALKPHOS   --   88  104   --   104   ALT   --   40  30   --   25   AST   --   80*  48*   --   40   ALBUMIN   --   4.4  4.2   --   4.0   LDH  977*  767*  501*   < >   --     < > = values in this interval not displayed.     TSH   Date Value Ref Range Status   04/03/2017 6.29 (H) 0.40 - 4.00 mU/L Final   03/10/2016 0.7 mcU/mL Final   09/04/2015 0.95 0.40 - 4.00 mU/L Final       ASSESSMENT AND PLAN   1. 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 Common variable immunodeficiency disorder  4. Splenomegaly    Tricia is doing well and has no new complains. I reviewed all her labs with her. For the most part - the most important one - her Hgb remains stable at 10.3 and elevated from last visit. She continues to have no Ig A and  low Ig G/Ig M. She continues to hemolyze and has undetectable haptoglobin, elevated bilirubin 2, rising LDH at 977 from 500 previously. We have been doing immunoglobulin infusions though they have not been very helpful this time around. We have other options including splenectomy, but given the stable Hgb I would not change at this time, but see her in 4 weeks.     She had a fair response to Ig infusions previously and it had helped her Hgb counts too. We would continue to do her infusions here.     I will see her in 4 weeks with labs.    Joes Summers  ,  Division of Hematology, Oncology & Transplantation  [facility].

## 2017-04-07 NOTE — MR AVS SNAPSHOT
After Visit Summary   4/7/2017    Tricia Sosa    MRN: [id number]           Patient Information     Date Of Birth          1940        Visit Information        Provider Department      4/7/2017 10:45 AM Jose Summers MD [facility]        Today's Diagnoses     CVID (common variable immunodeficiency) (H)    -  1    Other autoimmune hemolytic anemias (H)          Care Instructions    - Follow up in 4 weeks with labs prior to clinic visit        Follow-ups after your visit        Your next 10 appointments already scheduled     Apr 17, 2017  9:50 AM CDT   BRENDA Spine with Estela Rocha PT   [facility] [place] [facility] ([facility] [place])    [address]  [place] [address] [phone]            May 05, 2017 10:30 AM CDT   Level 5 with RH INFUSION CHAIR 5   [facility] ([facility])    [address]  [place] [address] [phone]            May 05, 2017 10:45 AM CDT   Return Visit with Jose Summers MD   [facility] ([facility])    [address]  [place] [address] [phone]            Jun 02, 2017 10:30 AM CDT   Level 5 with RH INFUSION CHAIR 5   [facility] ([facility])    [address]  [place] [address] [phone]              Who to contact     If you have questions or need follow up information about today's clinic visit or your schedule please contact [facility] directly at [phone].  Normal or non-critical lab and imaging results will be communicated to you by MyChart, letter or phone within 4 business days after the clinic has received the results. If you do not hear from us within 7 days, please contact  the clinic through [employer] or phone. If you have a critical or abnormal lab result, we will notify you by phone as soon as possible.  Submit refill requests through [employer] or call your pharmacy and they will forward the refill request to us. Please allow 3 business days for your refill to be completed.          Additional Information About Your Visit        [employer] Information     [employer] gives you secure access to your electronic health record. If you see a primary care provider, you can also send messages to your care team and make appointments. If you have questions, please call your primary care clinic.  If you do not have a primary care provider, please call [phone] and they will assist you.        Care EveryWhere ID     This is your Care EveryWhere ID. This could be used by other organizations to access your [place] medical records  [id number]        Your Vitals Were     Pulse Temperature Respirations Height Pulse Oximetry BMI (Body Mass Index)    69 97.6  F (36.4  C) (Tympanic) 16 1.829 m (6') 99% 20.18 kg/m2       Blood Pressure from Last 3 Encounters:   04/07/17 133/76   04/07/17 133/76   03/10/17 114/69    Weight from Last 3 Encounters:   04/07/17 67.5 kg (148 lb 13 oz)   04/07/17 67.5 kg (148 lb 13 oz)   03/01/17 67.4 kg (148 lb 8 oz)              Today, you had the following     No orders found for display       Primary Care Provider Office Phone # Fax #    Monika SHANIQUE Jason [facility] [phone]       [facility] [address]  [facility] [address]        Thank you!     Thank you for choosing [facility]  for your care. Our goal is always to provide you with excellent care. Hearing back from our patients is one way we can continue to improve our services. Please take a few minutes to complete the written survey that you may receive in the mail after your visit with us. Thank you!             Your Updated Medication List - Protect others around  you: Learn how to safely use, store and throw away your medicines at www.disposemymeds.org.          This list is accurate as of: 4/7/17  4:23 PM.  Always use your most recent med list.                   Brand Name Dispense Instructions for use    cyanocobalamin 1000 MCG tablet    vitamin  B-12         folic acid 1 MG tablet    FOLVITE         ketoconazole 2 % shampoo    NIZORAL         levothyroxine 137 MCG tablet    SYNTHROID/LEVOTHROID    30 tablet    Take 1 tablet (137 mcg) by mouth daily

## 2017-04-07 NOTE — PROGRESS NOTES
"Infusion Nursing Note:  Tricia Sosa presents today for IVIG and Solu-Cortef.    Patient seen by provider today: Yes: Dr. Summers.   present during visit today: Not Applicable.    Note:     Infusion Rate:  Started infusion at 0.5 ml/kg/hr and increased by 0.5 ml/kg/hr every 15 minutes for a maximum of 4 ml/kg/hr.    Intravenous Access:  Peripheral IV placed.    Treatment Conditions:  Rheumatology Infusion Checklist: PRIOR TO INFUSION OF BIOLOGICAL MEDICATIONS OR ANY OF THESE AS LISTED: Immune Globulin (IVIG) \".rheumbiologicalchecklist\"    Prior to Infusion of biological medications or any of these as listed:    1. Elevated temperature, fever, chills, productive cough or abnormal vital signs, night sweats, coughing up blood or sputum, no appetite or abnormal vital signs : NO    2. Open wounds or new incisions: NO    3. Recent hospitalization: NO    4.  Recent surgeries:  NO    5. Any upcoming surgeries or dental procedures?:NO    6. Any current or recent bouts of illness or infection? On any antibiotics? : NO    7. Any new, sudden or worsening abdominal pain :NO    8. Vaccination within 4 weeks? Patient or someone in the household is scheduled to receive vaccination? No live virus vaccines prior to or during treatment :NO    9. Any nervous system diseases [i.e. multiple sclerosis, Guillain-Burke, seizures, neurological  changes]: NO    10. Pregnant or breast feeding; or plans on pregnancy in the future: NO    11. Signs of worsening depression or suicidal ideations while taking benlysta:NO    12. New-onset medical symptoms: NO    13.  New cancer diagnosis or on chemotherapy or radiation NO    14.  Evaluate for any sign of active TB [Unexplained weight loss, Loss of appetite, Night sweats, Fever, Fatigue, Chills, Coughing for 3 weeks or longer, Hemoptysis (coughing up blood), Chest pain]: NO    **Note: If answered yes to any of the above, hold the infusion and contact ordering rheumatologist or on-call " rheumatologist.   .      Post Infusion Assessment:  Patient tolerated infusion without incident.  Blood return noted pre and post infusion.  Site patent and intact, free from redness, edema or discomfort.  No evidence of extravasations.  Access discontinued per protocol.    Discharge Plan:   Discharge instructions reviewed with: Patient.  Patient and/or family verbalized understanding of discharge instructions and all questions answered.  Copy of AVS reviewed with patient and/or family.  Patient will return 5/5/17 for next appointment.  Patient discharged in stable condition accompanied by: self.  Departure Mode: Ambulatory.    Pari Mota RN

## 2017-04-07 NOTE — MR AVS SNAPSHOT
After Visit Summary   4/7/2017    Tricia Sosa    MRN: [id number]           Patient Information     Date Of Birth          1940        Visit Information        Provider Department      4/7/2017 10:30 AM RH INFUSION CHAIR 10 [facility]        Today's Diagnoses     CVID (common variable immunodeficiency) (H)    -  1       Follow-ups after your visit        Your next 10 appointments already scheduled     Apr 17, 2017  9:50 AM CDT   BRENDA Spine with Estela Rocha PT   [facility] [place] [facility] ([facility] [place])    [address]  [place] [address]   [phone]            May 05, 2017 10:30 AM CDT   Level 5 with RH INFUSION CHAIR 5   [facility] ([facility])    [facility]  [address] [place], [place] [address]  [facility] [address]   [phone]            May 05, 2017 10:45 AM CDT   Return Visit with Jose Summers MD   [facility] ([facility])    [facility]  [address]  [facility] [address] [phone]            Jun 02, 2017 10:30 AM CDT   Level 5 with RH INFUSION CHAIR 5   [facility] ([facility])    [facility]  [address]  [facility] [address] [phone]              Who to contact     If you have questions or need follow up information about today's clinic visit or your schedule please contact [facility] directly at [phone].  Normal or non-critical lab and imaging results will be communicated to you by MyChart, letter or phone within 4 business days after the clinic has received the results. If you do not hear from us within 7 days, please contact the clinic through MyChart or phone. If you have a critical or abnormal lab result, we will notify you by  phone as soon as possible.  Submit refill requests through [employer] or call your pharmacy and they will forward the refill request to us. Please allow 3 business days for your refill to be completed.          Additional Information About Your Visit        "[employer] Information     [employer] gives you secure access to your electronic health record. If you see a primary care provider, you can also send messages to your care team and make appointments. If you have questions, please call your primary care clinic.  If you do not have a primary care provider, please call [phone] and they will assist you.        Care EveryWhere ID     This is your Care EveryWhere ID. This could be used by other organizations to access your [place] medical records  [id number]        Your Vitals Were     Pulse Temperature Respirations Pulse Oximetry BMI (Body Mass Index)       69 97.6  F (36.4  C) (Tympanic) 16 99% 20.18 kg/m2        Blood Pressure from Last 3 Encounters:   04/07/17 133/76   04/07/17 133/76   03/10/17 114/69    Weight from Last 3 Encounters:   04/07/17 67.5 kg (148 lb 13 oz)   04/07/17 67.5 kg (148 lb 13 oz)   03/01/17 67.4 kg (148 lb 8 oz)              We Performed the Following     Nursing Communication 1     Treatment Conditions     Vital signs        Primary Care Provider Office Phone # Fax #    Monika SHANIQUE Jason [facility] [phone]       [facility] [address] [facility] [address]        Thank you!     Thank you for choosing [facility]  for your care. Our goal is always to provide you with excellent care. Hearing back from our patients is one way we can continue to improve our services. Please take a few minutes to complete the written survey that you may receive in the mail after your visit with us. Thank you!             Your Updated Medication List - Protect others around you: Learn how to safely use, store and throw away your medicines at  www.disposemymeds.org.          This list is accurate as of: 4/7/17  1:49 PM.  Always use your most recent med list.                   Brand Name Dispense Instructions for use    cyanocobalamin 1000 MCG tablet    vitamin  B-12         folic acid 1 MG tablet    FOLVITE         ketoconazole 2 % shampoo    NIZORAL         levothyroxine 137 MCG tablet    SYNTHROID/LEVOTHROID    30 tablet    Take 1 tablet (137 mcg) by mouth daily

## 2017-04-11 ENCOUNTER — MYC MEDICAL ADVICE (OUTPATIENT)
Dept: FAMILY MEDICINE | Facility: CLINIC | Age: 77
End: 2017-04-11

## 2017-04-11 DIAGNOSIS — E03.9 HYPOTHYROIDISM, UNSPECIFIED TYPE: Primary | ICD-10-CM

## 2017-04-11 RX ORDER — LEVOTHYROXINE SODIUM 150 UG/1
150 TABLET ORAL DAILY
Qty: 90 TABLET | Refills: 0 | Status: SHIPPED | OUTPATIENT
Start: 2017-04-11 | End: 2017-07-05

## 2017-04-17 ENCOUNTER — THERAPY VISIT (OUTPATIENT)
Dept: PHYSICAL THERAPY | Facility: CLINIC | Age: 77
End: 2017-04-17
Payer: MEDICARE

## 2017-04-17 DIAGNOSIS — M54.2 CERVICALGIA: ICD-10-CM

## 2017-04-17 PROCEDURE — 97110 THERAPEUTIC EXERCISES: CPT | Mod: GP | Performed by: PHYSICAL THERAPIST

## 2017-04-17 PROCEDURE — 97140 MANUAL THERAPY 1/> REGIONS: CPT | Mod: GP | Performed by: PHYSICAL THERAPIST

## 2017-04-17 PROCEDURE — 97112 NEUROMUSCULAR REEDUCATION: CPT | Mod: GP | Performed by: PHYSICAL THERAPIST

## 2017-04-21 ENCOUNTER — OFFICE VISIT (OUTPATIENT)
Dept: FAMILY MEDICINE | Facility: CLINIC | Age: 77
End: 2017-04-21
Payer: COMMERCIAL

## 2017-04-21 VITALS
SYSTOLIC BLOOD PRESSURE: 118 MMHG | HEART RATE: 68 BPM | WEIGHT: 148 LBS | TEMPERATURE: 98.1 F | OXYGEN SATURATION: 98 % | BODY MASS INDEX: 20.07 KG/M2 | DIASTOLIC BLOOD PRESSURE: 70 MMHG

## 2017-04-21 DIAGNOSIS — R82.90 NONSPECIFIC FINDING ON EXAMINATION OF URINE: ICD-10-CM

## 2017-04-21 DIAGNOSIS — R30.0 DYSURIA: Primary | ICD-10-CM

## 2017-04-21 DIAGNOSIS — M25.551 HIP PAIN, RIGHT: ICD-10-CM

## 2017-04-21 LAB
ALBUMIN UR-MCNC: NEGATIVE MG/DL
AMORPH CRY #/AREA URNS HPF: ABNORMAL /HPF
APPEARANCE UR: CLEAR
BACTERIA #/AREA URNS HPF: ABNORMAL /HPF
BILIRUB UR QL STRIP: NEGATIVE
COLOR UR AUTO: YELLOW
GLUCOSE UR STRIP-MCNC: NEGATIVE MG/DL
HGB UR QL STRIP: ABNORMAL
KETONES UR STRIP-MCNC: NEGATIVE MG/DL
LEUKOCYTE ESTERASE UR QL STRIP: ABNORMAL
NITRATE UR QL: NEGATIVE
NON-SQ EPI CELLS #/AREA URNS LPF: ABNORMAL /LPF
PH UR STRIP: 7 PH (ref 5–7)
RBC #/AREA URNS AUTO: ABNORMAL /HPF (ref 0–2)
SP GR UR STRIP: 1.01 (ref 1–1.03)
URN SPEC COLLECT METH UR: ABNORMAL
UROBILINOGEN UR STRIP-ACNC: 0.2 EU/DL (ref 0.2–1)
WBC #/AREA URNS AUTO: ABNORMAL /HPF (ref 0–2)

## 2017-04-21 PROCEDURE — 99213 OFFICE O/P EST LOW 20 MIN: CPT | Performed by: NURSE PRACTITIONER

## 2017-04-21 PROCEDURE — 81001 URINALYSIS AUTO W/SCOPE: CPT | Performed by: NURSE PRACTITIONER

## 2017-04-21 PROCEDURE — 87086 URINE CULTURE/COLONY COUNT: CPT | Performed by: NURSE PRACTITIONER

## 2017-04-21 RX ORDER — SULFAMETHOXAZOLE/TRIMETHOPRIM 800-160 MG
TABLET ORAL
Refills: 3 | COMMUNITY
Start: 2017-03-22 | End: 2017-12-12

## 2017-04-21 NOTE — PROGRESS NOTES
SUBJECTIVE:                                                    Tricia Sosa is a 76 year old female who presents to clinic today for the following health issues:      URINARY TRACT SYMPTOMS      Duration: Last week, worsening today    Description  dysuria    Intensity:  moderate    Accompanying signs and symptoms:  Fever/chills: no   Flank pain no   Nausea and vomiting: no   Vaginal symptoms: none  Abdominal/Pelvic Pain: no     History  History of frequent UTI's: no   History of kidney stones: no   Sexually Active: no   Possibility of pregnancy: No    Precipitating or alleviating factors: None    Therapies tried and outcome: none        Mild burning with urination.  No vaginal symptoms.  No abdominal pain, n/v.  Normal intake.  No flank pain, no fever.    Problem list and histories reviewed & adjusted, as indicated.  Additional history: as documented    Patient Active Problem List   Diagnosis     Traction detachment of retina     Hypothyroidism     CVID (common variable immunodeficiency) (H)     Hemolytic anemia (H)     B12 deficiency     CARDIOVASCULAR SCREENING; LDL GOAL LESS THAN 160     Cervicalgia     Past Surgical History:   Procedure Laterality Date     C LIGATE FALLOPIAN TUBE       C THYROIDECTOMY       COLONOSCOPY N/A 2016    Procedure: COLONOSCOPY;  Surgeon: Dontae Del Rio MD;  Location:  GI      COLONOSCOPY W BIOPSY  00     HC COLONOSCOPY W BIOPSY  10/8/03     HC COLONOSCOPY W BIOPSY  05    REPEAT IN 5 YEARS     HC CYSTOSCOPY,INSERT URETERAL STENT      Ureteral stent insertion     HC FLEX SIGMOIDOSCOPY W BIOPSY  00     HC LAPAROSCOPY, SURGICAL; CHOLECYSTECTOMY       LIGATION OF HEMORRHOID(S)      Hemorrhoidectomy     UPPER GI ENDOSCOPY,BIOPSY  10/01/01       Social History   Substance Use Topics     Smoking status: Never Smoker     Smokeless tobacco: Never Used     Alcohol use No     Family History   Problem Relation Age of Onset     CANCER Mother       of colon  cancer at age 90     CEREBROVASCULAR DISEASE Father       at age 85     Colon Cancer No family hx of            Reviewed and updated as needed this visit by clinical staff  Tobacco  Allergies  Meds  Med Hx  Surg Hx  Fam Hx  Soc Hx      Reviewed and updated as needed this visit by Provider         ROS:  SEE HPI.    OBJECTIVE:                                                    /70  Pulse 68  Temp 98.1  F (36.7  C) (Oral)  Wt 148 lb (67.1 kg)  SpO2 98%  BMI 20.07 kg/m2  Body mass index is 20.07 kg/(m^2).  GENERAL: healthy, alert and no distress  RESP: lungs clear to auscultation - no rales, rhonchi or wheezes  CV: regular rate and rhythm, normal S1 S2, no S3 or S4, no murmur, click or rub, no peripheral edema and peripheral pulses strong  ABDOMEN: soft, nontender, no hepatosplenomegaly, no masses and bowel sounds normal  PSYCH: mentation appears normal, affect normal/bright    Diagnostic Test Results:  Results for orders placed or performed in visit on 17   *UA reflex to Microscopic and Culture ([place] and [facility] (except [place] and [place])   Result Value Ref Range    Color Urine Yellow     Appearance Urine Clear     Glucose Urine Negative NEG mg/dL    Bilirubin Urine Negative NEG    Ketones Urine Negative NEG mg/dL    Specific Gravity Urine 1.015 1.003 - 1.035    Blood Urine Trace (A) NEG    pH Urine 7.0 5.0 - 7.0 pH    Protein Albumin Urine Negative NEG mg/dL    Urobilinogen Urine 0.2 0.2 - 1.0 EU/dL    Nitrite Urine Negative NEG    Leukocyte Esterase Urine Trace (A) NEG    Source Midstream Urine    Urine Microscopic   Result Value Ref Range    WBC Urine 10-25 (A) 0 - 2 /HPF    RBC Urine O - 2 0 - 2 /HPF    Squamous Epithelial /LPF Urine Few FEW /LPF    Bacteria Urine Moderate (A) NEG /HPF    Amorphous Crystals Few (A) NEG /HPF        ASSESSMENT/PLAN:                                                    1. Dysuria  Recent onset symptoms.  UA c/w UTI.  Culture pending.  Start  bactrim BID x3 days.  Pt agrees with plan and verbalized understanding.  - *UA reflex to Microscopic and Culture ([place] and [facility] (except [place] and [place])  - Urine Microscopic    2. Nonspecific finding on examination of urine  - Urine Culture Aerobic Bacterial    3. Hip pain, right  Hx of hip pain, seen by Dr. Thorne in the past, would like to follow up.  - ORTHO  REFERRAL    SHANIQUE Walsh Ra, CNP  [facility] [place]

## 2017-04-21 NOTE — NURSING NOTE
Chief Complaint   Patient presents with     UTI       Initial /70  Pulse 68  Temp 98.1  F (36.7  C) (Oral)  Wt 148 lb (67.1 kg)  SpO2 98%  BMI 20.07 kg/m2 Estimated body mass index is 20.07 kg/(m^2) as calculated from the following:    Height as of 4/7/17: 6' (1.829 m).    Weight as of this encounter: 148 lb (67.1 kg).  Medication Reconciliation: complete   Little KELLEY M.A.

## 2017-04-21 NOTE — MR AVS SNAPSHOT
After Visit Summary   4/21/2017    Tricia Sosa    MRN: [id number]           Patient Information     Date Of Birth          1940        Visit Information        Provider Department      4/21/2017 3:00 PM Monika Whipple Ra, APRN CNP [facility]        Today's Diagnoses     Dysuria    -  1    Nonspecific finding on examination of urine        Hip pain, right          Care Instructions    Go ahead and take a total of 6 doses of bactrim, 1 tablet twice daily for the urinary tract infection.  I will let you know if any change is needed after the culture is back.  Please let me know if you have any questions or concerns.          Follow-ups after your visit        Additional Services     ORTHO  REFERRAL       [facility] is referring you to the Orthopedic  Services at [facility].       The  Representative will assist you in the coordination of your Orthopedic and Musculoskeletal Care as prescribed by your physician.    The  Representative will call you within 1 business day to help schedule your appointment, or you may contact the  Representative at:    All areas ~ [phone]     Type of Referral : Non Surgical       Timeframe requested: Routine    Coverage of these services is subject to the terms and limitations of your health insurance plan.  Please call member services at your health plan with any benefit or coverage questions.      If X-rays, CT or MRI's have been performed, please contact the facility where they were done to arrange for , prior to your scheduled appointment.  Please bring this referral request to your appointment and present it to your specialist.                  Your next 10 appointments already scheduled     May 01, 2017 10:30 AM CDT   BRENDA Spine with Estela Rocha PT   [facility]ine [facility]ON[facility] (BRENDA [facility]cc[facility])    [address]  [address]   [phone]            May 05, 2017 10:30 AM CDT   Level 5 with RH INFUSION CHAIR 5   [facility] ([facility])    [facility]  [address]  [facility] [address]   [phone]            May 05, 2017 10:45 AM CDT   Return Visit with Jose Summers MD   [facility] ([facility])    [facility]  [address]  [facility] [address]   [phone]            Jun 02, 2017 10:30 AM CDT   Level 5 with RH INFUSION CHAIR 5   [facility] ([facility])    [facility]  [address]  [facility] [address]   [phone]              Who to contact     If you have questions or need follow up information about today's clinic visit or your schedule please contact [facility] directly at [phone].  Normal or non-critical lab and imaging results will be communicated to you by Genomehart, letter or phone within 4 business days after the clinic has received the results. If you do not hear from us within 7 days, please contact the clinic through Everbridget or phone. If you have a critical or abnormal lab result, we will notify you by phone as soon as possible.  Submit refill requests through [employer] or call your pharmacy and they will forward the refill request to us. Please allow 3 business days for your refill to be completed.          Additional Information About Your Visit        [employer] Information     [employer] gives you secure access to your electronic health record. If you see a primary care provider, you can also send messages to your care team and make appointments. If you have questions, please call your primary care clinic.  If you do not have a primary care provider, please call [phone] and they will assist you.        Care EveryWhere ID     This is  your Care EveryWhere ID. This could be used by other organizations to access your [place] medical records  [id number]        Your Vitals Were     Pulse Temperature Pulse Oximetry BMI (Body Mass Index)          68 98.1  F (36.7  C) (Oral) 98% 20.07 kg/m2         Blood Pressure from Last 3 Encounters:   04/21/17 118/70   04/07/17 133/76   04/07/17 133/76    Weight from Last 3 Encounters:   04/21/17 148 lb (67.1 kg)   04/07/17 148 lb 13 oz (67.5 kg)   04/07/17 148 lb 13 oz (67.5 kg)              We Performed the Following     *UA reflex to Microscopic and Culture ([facility] and [facility] (except [place] and [place])     ORTHO  REFERRAL     Urine Culture Aerobic Bacterial     Urine Microscopic        Primary Care Provider Office Phone # Fax #    Monika SHANIQUE Jason [facility] [phone]       [facility] [address] [facility] [address]        Thank you!     Thank you for choosing [facility]  for your care. Our goal is always to provide you with excellent care. Hearing back from our patients is one way we can continue to improve our services. Please take a few minutes to complete the written survey that you may receive in the mail after your visit with us. Thank you!             Your Updated Medication List - Protect others around you: Learn how to safely use, store and throw away your medicines at www.disposemymeds.org.          This list is accurate as of: 4/21/17  3:41 PM.  Always use your most recent med list.                   Brand Name Dispense Instructions for use    cyanocobalamin 1000 MCG tablet    vitamin  B-12         folic acid 1 MG tablet    FOLVITE         ketoconazole 2 % shampoo    NIZORAL         levothyroxine 150 MCG tablet    SYNTHROID/LEVOTHROID    90 tablet    Take 1 tablet (150 mcg) by mouth daily       sulfamethoxazole-trimethoprim 800-160 MG per tablet    BACTRIM DS/SEPTRA DS

## 2017-04-21 NOTE — PATIENT INSTRUCTIONS
Go ahead and take a total of 6 doses of bactrim, 1 tablet twice daily for the urinary tract infection.  I will let you know if any change is needed after the culture is back.  Please let me know if you have any questions or concerns.

## 2017-04-23 LAB
BACTERIA SPEC CULT: NORMAL
MICRO REPORT STATUS: NORMAL
SPECIMEN SOURCE: NORMAL

## 2017-04-25 ENCOUNTER — CARE COORDINATION (OUTPATIENT)
Dept: ONCOLOGY | Facility: CLINIC | Age: 77
End: 2017-04-25

## 2017-04-25 DIAGNOSIS — D83.9 CVID (COMMON VARIABLE IMMUNODEFICIENCY) (H): Primary | ICD-10-CM

## 2017-04-25 DIAGNOSIS — E53.8 B12 DEFICIENCY: ICD-10-CM

## 2017-04-25 DIAGNOSIS — D59.19 OTHER AUTOIMMUNE HEMOLYTIC ANEMIAS: ICD-10-CM

## 2017-04-25 NOTE — PROGRESS NOTES
Spoke with MD.  He will place needed labs for 4 week f/u appt.  Srinivas Cavazos RN, BSN, OCN  [facility]  Patient Care Coordinator

## 2017-05-01 ENCOUNTER — THERAPY VISIT (OUTPATIENT)
Dept: PHYSICAL THERAPY | Facility: CLINIC | Age: 77
End: 2017-05-01
Payer: MEDICARE

## 2017-05-01 DIAGNOSIS — D83.9 CVID (COMMON VARIABLE IMMUNODEFICIENCY) (H): ICD-10-CM

## 2017-05-01 DIAGNOSIS — E53.8 B12 DEFICIENCY: ICD-10-CM

## 2017-05-01 DIAGNOSIS — M54.2 CERVICALGIA: ICD-10-CM

## 2017-05-01 DIAGNOSIS — D59.19 OTHER AUTOIMMUNE HEMOLYTIC ANEMIAS: ICD-10-CM

## 2017-05-01 LAB
ALBUMIN SERPL-MCNC: 3.9 G/DL (ref 3.4–5)
ALP SERPL-CCNC: 90 U/L (ref 40–150)
ALT SERPL W P-5'-P-CCNC: 39 U/L (ref 0–50)
ANION GAP SERPL CALCULATED.3IONS-SCNC: 8 MMOL/L (ref 3–14)
AST SERPL W P-5'-P-CCNC: 78 U/L (ref 0–45)
BASOPHILS # BLD AUTO: 0 10E9/L (ref 0–0.2)
BASOPHILS NFR BLD AUTO: 1 %
BILIRUB SERPL-MCNC: 1.4 MG/DL (ref 0.2–1.3)
BUN SERPL-MCNC: 14 MG/DL (ref 7–30)
CALCIUM SERPL-MCNC: 8.6 MG/DL (ref 8.5–10.1)
CHLORIDE SERPL-SCNC: 107 MMOL/L (ref 94–109)
CO2 SERPL-SCNC: 26 MMOL/L (ref 20–32)
CREAT SERPL-MCNC: 0.67 MG/DL (ref 0.52–1.04)
DIFFERENTIAL METHOD BLD: ABNORMAL
EOSINOPHIL # BLD AUTO: 0.1 10E9/L (ref 0–0.7)
EOSINOPHIL NFR BLD AUTO: 2 %
ERYTHROCYTE [DISTWIDTH] IN BLOOD BY AUTOMATED COUNT: 14.3 % (ref 10–15)
GFR SERPL CREATININE-BSD FRML MDRD: 86 ML/MIN/1.7M2
GLUCOSE SERPL-MCNC: 82 MG/DL (ref 70–99)
HCT VFR BLD AUTO: 29.7 % (ref 35–47)
HGB BLD-MCNC: 10.2 G/DL (ref 11.7–15.7)
LDH SERPL L TO P-CCNC: 764 U/L (ref 81–234)
LYMPHOCYTES # BLD AUTO: 0.6 10E9/L (ref 0.8–5.3)
LYMPHOCYTES NFR BLD AUTO: 13 %
MCH RBC QN AUTO: 33 PG (ref 26.5–33)
MCHC RBC AUTO-ENTMCNC: 34.3 G/DL (ref 31.5–36.5)
MCV RBC AUTO: 96 FL (ref 78–100)
MONOCYTES # BLD AUTO: 0.6 10E9/L (ref 0–1.3)
MONOCYTES NFR BLD AUTO: 14 %
NEUTROPHILS # BLD AUTO: 3.3 10E9/L (ref 1.6–8.3)
NEUTROPHILS NFR BLD AUTO: 70 %
PLATELET # BLD AUTO: 161 10E9/L (ref 150–450)
PLATELET # BLD EST: NORMAL 10*3/UL
POTASSIUM SERPL-SCNC: 4 MMOL/L (ref 3.4–5.3)
PROT SERPL-MCNC: 6.4 G/DL (ref 6.8–8.8)
RBC # BLD AUTO: 3.09 10E12/L (ref 3.8–5.2)
RBC MORPH BLD: ABNORMAL
RETICS # AUTO: 93 10E9/L (ref 25–95)
RETICS/RBC NFR AUTO: 3 % (ref 0.5–2)
SODIUM SERPL-SCNC: 141 MMOL/L (ref 133–144)
VIT B12 SERPL-MCNC: 722 PG/ML (ref 193–986)
WBC # BLD AUTO: 4.6 10E9/L (ref 4–11)

## 2017-05-01 PROCEDURE — 83615 LACTATE (LD) (LDH) ENZYME: CPT | Performed by: INTERNAL MEDICINE

## 2017-05-01 PROCEDURE — 82784 ASSAY IGA/IGD/IGG/IGM EACH: CPT | Mod: 59 | Performed by: INTERNAL MEDICINE

## 2017-05-01 PROCEDURE — 83010 ASSAY OF HAPTOGLOBIN QUANT: CPT | Performed by: INTERNAL MEDICINE

## 2017-05-01 PROCEDURE — 85045 AUTOMATED RETICULOCYTE COUNT: CPT | Performed by: INTERNAL MEDICINE

## 2017-05-01 PROCEDURE — 97112 NEUROMUSCULAR REEDUCATION: CPT | Mod: GP | Performed by: PHYSICAL THERAPIST

## 2017-05-01 PROCEDURE — 82784 ASSAY IGA/IGD/IGG/IGM EACH: CPT | Performed by: INTERNAL MEDICINE

## 2017-05-01 PROCEDURE — 97110 THERAPEUTIC EXERCISES: CPT | Mod: GP | Performed by: PHYSICAL THERAPIST

## 2017-05-01 PROCEDURE — 80053 COMPREHEN METABOLIC PANEL: CPT | Performed by: INTERNAL MEDICINE

## 2017-05-01 PROCEDURE — 85025 COMPLETE CBC W/AUTO DIFF WBC: CPT | Performed by: INTERNAL MEDICINE

## 2017-05-01 PROCEDURE — 85060 BLOOD SMEAR INTERPRETATION: CPT | Performed by: INTERNAL MEDICINE

## 2017-05-01 PROCEDURE — 82607 VITAMIN B-12: CPT | Performed by: INTERNAL MEDICINE

## 2017-05-01 PROCEDURE — G8981 BODY POS CURRENT STATUS: HCPCS | Mod: GP | Performed by: PHYSICAL THERAPIST

## 2017-05-01 PROCEDURE — G8982 BODY POS GOAL STATUS: HCPCS | Mod: GP | Performed by: PHYSICAL THERAPIST

## 2017-05-01 PROCEDURE — 36415 COLL VENOUS BLD VENIPUNCTURE: CPT | Performed by: INTERNAL MEDICINE

## 2017-05-01 NOTE — PROGRESS NOTES
Subjective:    HPI                    Objective:    System    Physical Exam    General     ROS    Assessment/Plan:      PROGRESS  REPORT    Progress reporting period is from 3/7/17 to 5/1/17.       SUBJECTIVE  Subjective changes noted by patient:  if she sits for too long she has a hard time standing up straight, not having the pain in side of neck like she used to, doesn't like lying on stomach to exercise, if she really looks hard certain directions can still produce pain    Current pain level is 1-3/10  .     Previous pain level was  2/10  .   Changes in function:  Yes (See Goal flowsheet attached for changes in current functional level)  Adverse reaction to treatment or activity: None    OBJECTIVE  Changes noted in objective findings:  Yes,   Objective: B rot 75%, UT compensation w/ L UE elevation noted, winging scapula noted on L      ASSESSMENT/PLAN  Updated problem list and treatment plan: Diagnosis 1:  Neck pain  Pain -  hot/cold therapy, manual therapy, education, directional preference exercise and home program  Decreased ROM/flexibility - manual therapy, therapeutic exercise and home program  Decreased strength - therapeutic exercise, therapeutic activities and home program  Edema - vasopneumatics and self management/home program  Impaired muscle performance - neuro re-education and home program  Decreased function - therapeutic activities and home program  STG/LTGs have been met or progress has been made towards goals:  Yes (See Goal flow sheet completed today.)  Assessment of Progress: The patient's condition has potential to improve.  Self Management Plans:  Patient has been instructed in a home treatment program.  Patient  has been instructed in self management of symptoms.  I have re-evaluated this patient and find that the nature, scope, duration and intensity of the therapy is appropriate for the medical condition of the patient.  Tricia continues to require the following intervention to meet STG  and LTG's:  PT    Recommendations:  This patient would benefit from continued therapy.     Frequency:  2 X a month, once daily  Duration:  for 2 months        Please refer to the daily flowsheet for treatment today, total treatment time and time spent performing 1:1 timed codes.

## 2017-05-01 NOTE — MR AVS SNAPSHOT
After Visit Summary   5/1/2017    Tricia Sosa    MRN: [id number]           Patient Information     Date Of Birth          1940        Visit Information        Provider Department      5/1/2017 10:30 AM Estela Rocha PT [facility]        Today's Diagnoses     Cervicalgia           Follow-ups after your visit        Your next 10 appointments already scheduled     May 05, 2017 10:30 AM CDT   Level 5 with RH INFUSION CHAIR 5   [facility] ([facility])    [facility]  [address]  [facility] [address]   [phone]            May 05, 2017 10:45 AM CDT   Return Visit with Jose Summers MD   [facility] ([facility])    [facility]  [address]  [facility] [address]   [phone]            May 08, 2017 11:00 AM CDT   New Visit with Murray Ryder DO   [facility] ([facility])    [address]  [address]  [facility] [address]   [phone]            Jun 02, 2017 10:30 AM CDT   Level 5 with RH INFUSION CHAIR 5   [facility] ([facility])    [facility]  [address]  [facility] [address]   [phone]              Who to contact     If you have questions or need follow up information about today's clinic visit or your schedule please contact [facility] directly at [phone].  Normal or non-critical lab and imaging results will be communicated to you by MyChart, letter or phone within 4 business days after the clinic has received the results. If you do not hear from us within 7 days, please contact the clinic through MyChart or phone. If you have a critical or abnormal lab result, we will notify you by phone as soon as  possible.  Submit refill requests through SavingStar or call your pharmacy and they will forward the refill request to us. Please allow 3 business days for your refill to be completed.          Additional Information About Your Visit        Perpet[employer] Serviceshart Information     SavingStar gives you secure access to your electronic health record. If you see a primary care provider, you can also send messages to your care team and make appointments. If you have questions, please call your primary care clinic.  If you do not have a primary care provider, please call [phone] and they will assist you.        Care EveryWhere ID     This is your Care EveryWhere ID. This could be used by other organizations to access your [place] medical records  [id number]         Blood Pressure from Last 3 Encounters:   04/21/17 118/70   04/07/17 133/76   04/07/17 133/76    Weight from Last 3 Encounters:   04/21/17 67.1 kg (148 lb)   04/07/17 67.5 kg (148 lb 13 oz)   04/07/17 67.5 kg (148 lb 13 oz)              We Performed the Following     BRENDA PROGRESS NOTES REPORT     NEUROMUSCULAR RE-EDUCATION     THERAPEUTIC EXERCISES        Primary Care Provider Office Phone # Fax #    Monika SHANIQUE Jason [facility] [phone]       [facility] [address]  [facility] [address]        Thank you!     Thank you for choosing [facility]  for your care. Our goal is always to provide you with excellent care. Hearing back from our patients is one way we can continue to improve our services. Please take a few minutes to complete the written survey that you may receive in the mail after your visit with us. Thank you!             Your Updated Medication List - Protect others around you: Learn how to safely use, store and throw away your medicines at www.disposemymeds.org.          This list is accurate as of: 5/1/17  2:12 PM.  Always use your most recent med list.                   Brand Name Dispense  Instructions for use    cyanocobalamin 1000 MCG tablet    vitamin  B-12         folic acid 1 MG tablet    FOLVITE         ketoconazole 2 % shampoo    NIZORAL         levothyroxine 150 MCG tablet    SYNTHROID/LEVOTHROID    90 tablet    Take 1 tablet (150 mcg) by mouth daily       sulfamethoxazole-trimethoprim 800-160 MG per tablet    BACTRIM DS/SEPTRA DS

## 2017-05-02 LAB
COPATH REPORT: NORMAL
HAPTOGLOB SERPL-MCNC: <6 MG/DL (ref 35–175)
IGG SERPL-MCNC: 839 MG/DL (ref 695–1620)
IGM SERPL-MCNC: 131 MG/DL (ref 60–265)

## 2017-05-05 ENCOUNTER — ONCOLOGY VISIT (OUTPATIENT)
Dept: ONCOLOGY | Facility: CLINIC | Age: 77
End: 2017-05-05
Attending: ALLERGY & IMMUNOLOGY
Payer: MEDICARE

## 2017-05-05 ENCOUNTER — INFUSION THERAPY VISIT (OUTPATIENT)
Dept: INFUSION THERAPY | Facility: CLINIC | Age: 77
End: 2017-05-05
Attending: ALLERGY & IMMUNOLOGY
Payer: MEDICARE

## 2017-05-05 VITALS
WEIGHT: 149.03 LBS | BODY MASS INDEX: 20.19 KG/M2 | SYSTOLIC BLOOD PRESSURE: 146 MMHG | HEIGHT: 72 IN | RESPIRATION RATE: 16 BRPM | DIASTOLIC BLOOD PRESSURE: 61 MMHG | TEMPERATURE: 98.2 F | HEART RATE: 113 BPM | OXYGEN SATURATION: 98 %

## 2017-05-05 VITALS
OXYGEN SATURATION: 98 % | WEIGHT: 149.1 LBS | RESPIRATION RATE: 16 BRPM | TEMPERATURE: 98.2 F | SYSTOLIC BLOOD PRESSURE: 146 MMHG | DIASTOLIC BLOOD PRESSURE: 61 MMHG | HEART RATE: 113 BPM | BODY MASS INDEX: 20.22 KG/M2

## 2017-05-05 DIAGNOSIS — D59.19 OTHER AUTOIMMUNE HEMOLYTIC ANEMIAS: ICD-10-CM

## 2017-05-05 DIAGNOSIS — D83.9 CVID (COMMON VARIABLE IMMUNODEFICIENCY) (H): Primary | ICD-10-CM

## 2017-05-05 DIAGNOSIS — E53.8 B12 DEFICIENCY: ICD-10-CM

## 2017-05-05 PROCEDURE — 25000128 H RX IP 250 OP 636: Performed by: INTERNAL MEDICINE

## 2017-05-05 PROCEDURE — 99213 OFFICE O/P EST LOW 20 MIN: CPT | Performed by: INTERNAL MEDICINE

## 2017-05-05 PROCEDURE — 96375 TX/PRO/DX INJ NEW DRUG ADDON: CPT

## 2017-05-05 PROCEDURE — 96365 THER/PROPH/DIAG IV INF INIT: CPT

## 2017-05-05 PROCEDURE — 96366 THER/PROPH/DIAG IV INF ADDON: CPT

## 2017-05-05 RX ORDER — ACETAMINOPHEN 325 MG/1
650 TABLET ORAL
Status: CANCELLED
Start: 2017-05-05

## 2017-05-05 RX ORDER — DIPHENHYDRAMINE HCL 25 MG
25 CAPSULE ORAL
Status: CANCELLED | OUTPATIENT
Start: 2017-05-05

## 2017-05-05 RX ADMIN — HYDROCORTISONE SODIUM SUCCINATE 100 MG: 100 INJECTION, POWDER, FOR SOLUTION INTRAMUSCULAR; INTRAVENOUS at 10:49

## 2017-05-05 RX ADMIN — SODIUM CHLORIDE 250 ML: 9 INJECTION, SOLUTION INTRAVENOUS at 10:49

## 2017-05-05 RX ADMIN — HUMAN IMMUNOGLOBULIN G 35 G: 20 LIQUID INTRAVENOUS at 11:25

## 2017-05-05 ASSESSMENT — PAIN SCALES - GENERAL
PAINLEVEL: NO PAIN (0)
PAINLEVEL: NO PAIN (0)

## 2017-05-05 NOTE — MR AVS SNAPSHOT
After Visit Summary   5/5/2017    Tricia Sosa    MRN: [id number]           Patient Information     Date Of Birth          1940        Visit Information        Provider Department      5/5/2017 10:30 AM RH INFUSION CHAIR 5 [facility]        Today's Diagnoses     CVID (common variable immunodeficiency) (H)    -  1       Follow-ups after your visit        Your next 10 appointments already scheduled     May 08, 2017 11:00 AM CDT   New Visit with Murray Ryder DO   [facility] ([facility])    [address]  [address]  [facility] [address]   [phone]            Jun 02, 2017 10:30 AM CDT   Level 5 with RH INFUSION CHAIR 5   [facility] ([facility])    [facility]  [address]  [facility] [address]   [phone]            Jun 27, 2017 10:30 AM CDT   Level 5 with RH INFUSION CHAIR 12   [facility] ([facility])    [facility]  [address]  [facility] [address] [phone]            Jun 27, 2017 10:45 AM CDT   Return Visit with Jose Summers MD   [facility] ([facility])    [facility]  [address]  [facility] [address] [phone]              Who to contact     If you have questions or need follow up information about today's clinic visit or your schedule please contact [facility] directly at [phone].  Normal or non-critical lab and imaging results will be communicated to you by MyChart, letter or phone within 4 business days after the clinic has received the results. If you do not hear from us within 7 days, please contact the clinic through MyChart or phone. If you have a critical or abnormal lab result, we  will notify you by phone as soon as possible.  Submit refill requests through Trendzo or call your pharmacy and they will forward the refill request to us. Please allow 3 business days for your refill to be completed.          Additional Information About Your Visit        S[employer]BSW[employer]Get[employer] Information     T[employer] gives you secure access to your electronic health record. If you see a primary care provider, you can also send messages to your care team and make appointments. If you have questions, please call your primary care clinic.  If you do not have a primary care provider, please call [phone] and they will assist you.        Care EveryWhere ID     This is your Care EveryWhere ID. This could be used by other organizations to access your [place] medical records  [id number]        Your Vitals Were     Pulse Temperature Respirations Pulse Oximetry BMI (Body Mass Index)       113 98.2  F (36.8  C) (Oral) 16 98% 20.22 kg/m2        Blood Pressure from Last 3 Encounters:   05/05/17 146/61   05/05/17 146/61   04/21/17 118/70    Weight from Last 3 Encounters:   05/05/17 67.6 kg (149 lb 0.5 oz)   05/05/17 67.6 kg (149 lb 1.6 oz)   04/21/17 67.1 kg (148 lb)              We Performed the Following     Nursing Communication 1     Treatment Conditions     Vital signs        Primary Care Provider Office Phone # Fax #    Monika SHANIQUE Jason [facility] [phone]       [facility] [address] [address] [facility] [address]        Thank you!     Thank you for choosing [facility]  for your care. Our goal is always to provide you with excellent care. Hearing back from our patients is one way we can continue to improve our services. Please take a few minutes to complete the written survey that you may receive in the mail after your visit with us. Thank you!             Your Updated Medication List - Protect others around you: Learn how to safely use, store and throw away your  medicines at www.disposemymeds.org.          This list is accurate as of: 5/5/17  2:04 PM.  Always use your most recent med list.                   Brand Name Dispense Instructions for use    cyanocobalamin 1000 MCG tablet    vitamin  B-12         folic acid 1 MG tablet    FOLVITE         ketoconazole 2 % shampoo    NIZORAL         levothyroxine 150 MCG tablet    SYNTHROID/LEVOTHROID    90 tablet    Take 1 tablet (150 mcg) by mouth daily       sulfamethoxazole-trimethoprim 800-160 MG per tablet    BACTRIM DS/SEPTRA DS

## 2017-05-05 NOTE — MR AVS SNAPSHOT
After Visit Summary   5/5/2017    Tricia Sosa    MRN: [id number]           Patient Information     Date Of Birth          1940        Visit Information        Provider Department      5/5/2017 10:45 AM Jose Summers MD [facility]         Follow-ups after your visit        Your next 10 appointments already scheduled     May 08, 2017 11:00 AM CDT   New Visit with Murray Ryder DO   [facility] [facility] ([facility])    [address]  [address]  [facility] [address]   [phone]            Jun 02, 2017 10:30 AM CDT   Level 5 with RH INFUSION CHAIR 5   [facility] ([facility])    [facility]  [address]  [facility] [address] [phone]            Jun 27, 2017 10:30 AM CDT   Level 5 with RH INFUSION CHAIR 12   [facility] ([facility])    [facility]  [address]  [facility] [address] [phone]            Jun 27, 2017 10:45 AM CDT   Return Visit with Jose Summers MD   [facility] ([facility])    [facility]  [address]  [facility] [address] [phone]              Who to contact     If you have questions or need follow up information about today's clinic visit or your schedule please contact [facility] directly at [phone].  Normal or non-critical lab and imaging results will be communicated to you by MyChart, letter or phone within 4 business days after the clinic has received the results. If you do not hear from us within 7 days, please contact the clinic through MyChart or phone. If you have a critical or abnormal lab result, we will notify you by phone as soon as possible.  Submit refill requests through Think Gaminghart or call your  pharmacy and they will forward the refill request to us. Please allow 3 business days for your refill to be completed.          Additional Information About Your Visit        [employer] Game Universehart Information     [employer] gives you secure access to your electronic health record. If you see a primary care provider, you can also send messages to your care team and make appointments. If you have questions, please call your primary care clinic.  If you do not have a primary care provider, please call [phone] and they will assist you.        Care EveryWhere ID     This is your Care EveryWhere ID. This could be used by other organizations to access your [place] medical records  [id number]        Your Vitals Were     Pulse Temperature Respirations Height Pulse Oximetry BMI (Body Mass Index)    113 98.2  F (36.8  C) (Tympanic) 16 1.829 m (6') 98% 20.21 kg/m2       Blood Pressure from Last 3 Encounters:   05/05/17 146/61   05/05/17 146/61   04/21/17 118/70    Weight from Last 3 Encounters:   05/05/17 67.6 kg (149 lb 0.5 oz)   05/05/17 67.6 kg (149 lb 1.6 oz)   04/21/17 67.1 kg (148 lb)              Today, you had the following     No orders found for display       Primary Care Provider Office Phone # Fax #    SHANIQUE Walsh Ra [facility] [phone]       [facility] [address] [address] [facility] [address]        Thank you!     Thank you for choosing [facility]  for your care. Our goal is always to provide you with excellent care. Hearing back from our patients is one way we can continue to improve our services. Please take a few minutes to complete the written survey that you may receive in the mail after your visit with us. Thank you!             Your Updated Medication List - Protect others around you: Learn how to safely use, store and throw away your medicines at www.disposemymeds.org.          This list is accurate as of: 5/5/17  1:57 PM.  Always use your most recent med list.                    Brand Name Dispense Instructions for use    cyanocobalamin 1000 MCG tablet    vitamin  B-12         folic acid 1 MG tablet    FOLVITE         ketoconazole 2 % shampoo    NIZORAL         levothyroxine 150 MCG tablet    SYNTHROID/LEVOTHROID    90 tablet    Take 1 tablet (150 mcg) by mouth daily       sulfamethoxazole-trimethoprim 800-160 MG per tablet    BACTRIM DS/SEPTRA DS

## 2017-05-05 NOTE — PROGRESS NOTES
"Infusion Nursing Note:  Tricia Sosa presents today for IVIG and Solu-Cortef.    Patient seen by provider today: Yes: Dr. Summers.   present during visit today: Not Applicable.    Note:     Infusion Rate: Started infusion at 0.5 ml/kg/hr and increased by 0.5 ml/kg/hr every 15 minutes for a maximum of 4 ml/kg/hr.    Intravenous Access:  Peripheral IV placed.    Treatment Conditions:  Rheumatology Infusion Checklist: PRIOR TO INFUSION OF BIOLOGICAL MEDICATIONS OR ANY OF THESE AS LISTED: Immune Globulin (IVIG) \".rheumbiologicalchecklist\"    Prior to Infusion of biological medications or any of these as listed:    1. Elevated temperature, fever, chills, productive cough or abnormal vital signs, night sweats, coughing up blood or sputum, no appetite or abnormal vital signs : NO    2. Open wounds or new incisions: NO    3. Recent hospitalization: NO    4.  Recent surgeries:  NO    5. Any upcoming surgeries or dental procedures?:NO    6. Any current or recent bouts of illness or infection? On any antibiotics? : NO    7. Any new, sudden or worsening abdominal pain :NO    8. Vaccination within 4 weeks? Patient or someone in the household is scheduled to receive vaccination? No live virus vaccines prior to or during treatment :NO    9. Any nervous system diseases [i.e. multiple sclerosis, Guillain-[place], seizures, neurological  changes]: NO    10. Pregnant or breast feeding; or plans on pregnancy in the future: NO    11. Signs of worsening depression or suicidal ideations while taking benlysta:NO    12. New-onset medical symptoms: NO    13.  New cancer diagnosis or on chemotherapy or radiation NO    14.  Evaluate for any sign of active TB [Unexplained weight loss, Loss of appetite, Night sweats, Fever, Fatigue, Chills, Coughing for 3 weeks or longer, Hemoptysis (coughing up blood), Chest pain]: NO    **Note: If answered yes to any of the above, hold the infusion and contact ordering rheumatologist or on-call " rheumatologist.   .      Post Infusion Assessment:  Patient tolerated infusion without incident.  Blood return noted pre and post infusion.  Site patent and intact, free from redness, edema or discomfort.  No evidence of extravasations.  Access discontinued per protocol.    Discharge Plan:   Discharge instructions reviewed with: Patient.  Patient and/or family verbalized understanding of discharge instructions and all questions answered.  Copy of AVS reviewed with patient and/or family.  Patient will return 6/2/17 for next appointment.  Patient discharged in stable condition accompanied by: self.  Departure Mode: Ambulatory.    Pari Mota RN

## 2017-05-06 NOTE — PROGRESS NOTES
[facility] CANCER CLINIC  FOLLOW-UP VISIT NOTE    PATIENT NAME: Tricia Sosa MRN # [id number]  DATE OF VISIT: May 5, 2017 YOB: 1940    REFERRING PROVIDER: No referring provider defined for this encounter.    DIAGNOSIS: Hemolytic anemia-autoimmune; IgA deficiency    TREATMENT SUMMARY:  Tricia has been diagnosed with IgA deficiency since her around 2000.  She was very sick at the time and had severe diarrhea.  She lost about 40 pounds in weight.  The workup revealed that she had markedly diminished CD4 counts of 48 and was diagnosed with CMV colitis.  Since then she has been followed in hematology clinic at [place] until recently.  She was noted to have anemia which was fairly long-standing.  She was initially referred for anemia in 2004 and also had a bone marrow biopsy done at that time which revealed hypercellular bone marrow with 70-80% cellularity and normal trilineage hematopoiesis and no morphologic features of MDS or lymphoproliferation.  Her anemia was attributed to her chronic underlying disease.  At the next evaluation in 2002 on 4 aggressive anemia there was no evidence of hemolysis, iron deficiency, B12 or folate deficiency.  Bone marrow biopsy was not pursued.  In summer of 2015 she had progressive fatigue, dyspnea on exertion and worsening anemia with a hemoglobin now of 9.  Workup at this time did suggest a hemolytic anemia with elevated LDH at 709, undetectable haptoglobin and elevated unconjugated bilirubin at 3.3.  Monospecific MARIKA was positive for both IgG and complement.  Cold agglutinin screen was positive with very low titer 1:64.  She was started on prednisone 60 mg daily with supplementation of iron folate and B12 starting 7/31/15.  Her prednisone has been slowly tapered and was discontinued off in January 2016.  She was last followed at [facility] on March 10 when she had stable hemoglobin.      SUBJECTIVE   Tricia comes alone for this clinic visit for  her hemolytic anemia.    Tricia is doing well overall. She has no new complains. She is here with labs done prior to clinic visit.       PAST MEDICAL HISTORY   1. Common variable immunodeficiency syndrome  2. 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 Selective IgA deficiency  4. Leukopenia with markedly low CD4 counts on Bactrim prophylaxis  5. History of fall with subdural hematoma in 6/11/14 with complete resolution  6. Hashimoto's disease status post partial thyroidectomy      CURRENT OUTPATIENT MEDICATIONS     Current Outpatient Prescriptions   Medication Sig     sulfamethoxazole-trimethoprim (BACTRIM DS/SEPTRA DS) 800-160 MG per tablet      levothyroxine (SYNTHROID/LEVOTHROID) 150 MCG tablet Take 1 tablet (150 mcg) by mouth daily     ketoconazole (NIZORAL) 2 % shampoo      cyanocobalamin (VITAMIN  B-12) 1000 MCG tablet      folic acid (FOLVITE) 1 MG tablet      No current facility-administered medications for this visit.         ALLERGIES     Allergies   Allergen Reactions     Doxycycline         REVIEW OF SYSTEMS   As above in the HPI, o/w complete 12-point ROS was negative.     PHYSICAL EXAM   /61  Pulse 113  Temp 98.2  F (36.8  C) (Tympanic)  Resp 16  Ht 1.829 m (6')  Wt 67.6 kg (149 lb 0.5 oz)  SpO2 98%  BMI 20.21 kg/m2    SpO2 Readings from Last 4 Encounters:   05/05/17 98%   05/05/17 98%   04/21/17 98%   04/07/17 99%     Wt Readings from Last 3 Encounters:   05/05/17 67.6 kg (149 lb 0.5 oz)   05/05/17 67.6 kg (149 lb 1.6 oz)   04/21/17 67.1 kg (148 lb)     GEN: NAD  HEENT: PERRL, EOMI, no icterus, injection or pallor. Oropharynx is clear.  NECK: no cervical or supraclavicular lymphadenopathy  LUNGS: clear bilaterally  CV: regular, no murmurs, rubs, or gallops  ABDOMEN: soft, non-tender, non-distended, normal bowel sounds, no hepatosplenomegaly by percussion or palpation  EXT: warm, well perfused, no edema  NEURO: alert  SKIN: no rashes   LABORATORY  AND IMAGING STUDIES     Recent Labs   Lab Test  05/01/17   1022  02/06/17   1351  11/28/16   0919  11/02/16   1520  08/22/16   0901   NA  141  142  142  140  142   POTASSIUM  4.0  3.9  4.3  3.9  4.3   CHLORIDE  107  104  106  106  108   CO2  26  29  28  28  28   ANIONGAP  8  9  8  6  6   BUN  14  14  11  18  11   CR  0.67  0.74  0.75  0.84  0.71   GLC  82  83  88  117*  91   CULLEN  8.6  8.4*  9.0  8.6  8.6     No results for input(s): MAG, PHOS in the last 39075 hours.  Recent Labs   Lab Test  05/01/17   1022  04/03/17   1132  02/06/17   1351  11/28/16   0919  11/07/16   0904   WBC  4.6  4.4  5.4  5.2  4.6   HGB  10.2*  10.3*  10.1*  10.6*  10.4*   PLT  161  148*  178  203  204   MCV  96  98  100  100  98   NEUTROPHIL  70.0  70.0  76.0  68.0  62.0     Recent Labs   Lab Test  05/01/17   1022  04/03/17   1132  02/06/17   1351  11/28/16   0919   BILITOTAL  1.4*   --   2.0*  2.3*   ALKPHOS  90   --   88  104   ALT  39   --   40  30   AST  78*   --   80*  48*   ALBUMIN  3.9   --   4.4  4.2   LDH  764*  977*  767*  501*     TSH   Date Value Ref Range Status   04/03/2017 6.29 (H) 0.40 - 4.00 mU/L Final   03/10/2016 0.7 mcU/mL Final   09/04/2015 0.95 0.40 - 4.00 mU/L Final          ASSESSMENT AND PLAN   1. 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 Common variable immunodeficiency disorder  4. Splenomegaly    Tricia is doing well and has no new complains. I reviewed all her labs with her. For the most part - the most important one - her Hgb remains stable at 10.2 and stable from last visit. She continues to have no Ig A and low Ig G/Ig M. She continues to hemolyze and has undetectable haptoglobin, elevated bilirubin 1.4 improved from 2 last time, elevated LDH at 764 improved from 977 previously. We have been doing immunoglobulin infusions though they have not been very helpful this time around. We have other options including  rituximab, but given the stable Hgb I would not change at this time, but see her in 8 weeks.     She had a fair response to Ig infusions previously and it had helped her Hgb counts too. We would continue to do her infusions here.     I will see her in 8 weeks with labs.    Jose Spain ,  Division of Hematology, Oncology & Transplantation  [facility].

## 2017-05-08 ENCOUNTER — OFFICE VISIT (OUTPATIENT)
Dept: ORTHOPEDICS | Facility: CLINIC | Age: 77
End: 2017-05-08
Payer: COMMERCIAL

## 2017-05-08 ENCOUNTER — RADIANT APPOINTMENT (OUTPATIENT)
Dept: GENERAL RADIOLOGY | Facility: CLINIC | Age: 77
End: 2017-05-08
Attending: PHYSICAL MEDICINE & REHABILITATION
Payer: COMMERCIAL

## 2017-05-08 VITALS
HEIGHT: 72 IN | DIASTOLIC BLOOD PRESSURE: 68 MMHG | WEIGHT: 149 LBS | SYSTOLIC BLOOD PRESSURE: 142 MMHG | BODY MASS INDEX: 20.18 KG/M2

## 2017-05-08 DIAGNOSIS — G89.29 CHRONIC RIGHT HIP PAIN: Primary | ICD-10-CM

## 2017-05-08 DIAGNOSIS — M25.551 CHRONIC RIGHT HIP PAIN: Primary | ICD-10-CM

## 2017-05-08 DIAGNOSIS — M25.551 PAIN IN JOINT, PELVIC REGION AND THIGH, RIGHT: ICD-10-CM

## 2017-05-08 PROCEDURE — 99203 OFFICE O/P NEW LOW 30 MIN: CPT | Performed by: PHYSICAL MEDICINE & REHABILITATION

## 2017-05-08 PROCEDURE — 73502 X-RAY EXAM HIP UNI 2-3 VIEWS: CPT

## 2017-05-08 NOTE — MR AVS SNAPSHOT
After Visit Summary   5/8/2017    Tricia Sosa    MRN: [id number]           Patient Information     Date Of Birth          1940        Visit Information        Provider Department      5/8/2017 11:00 AM Murray Ryder DO [facility]        Today's Diagnoses     Chronic right hip pain    -  1      Care Instructions    We addressed the following today:    1. Right hip pain    Activity modification as discussed  Topical Treatments: Ice or heat  Over the counter medication: Acetaminophen (Tylenol) 1000 mg every 6 hours with food (Maximum of 3000 mg/day)  Ibuprofen (Advil) maximum of 800 mg four times a day with food  Other specific instructions: Call if interested in formal physical therapy referral for further treatment purposes  Follow up as needed for further evaluation/medical care (sooner if needed; call direct clinic number [[phone]] at any time with questions or concerns)          Follow-ups after your visit        Your next 10 appointments already scheduled     Jun 02, 2017 10:30 AM CDT   Level 5 with RH INFUSION CHAIR 5   [facility] ([facility])    [facility]  [address]  [place] [address]   [phone]            Jun 27, 2017 10:30 AM CDT   Level 5 with RH INFUSION CHAIR 12   [facility] ([facility])    [facility]  [address]  [place] [address]   [phone]            Jun 27, 2017 10:45 AM CDT   Return Visit with Jose Summers MD   [facility] ([facility])    [facility]  [address]  [place] [address]   [phone]              Who to contact     If you have questions or need follow up information about today's clinic visit or your schedule please contact [facility]  directly at [phone].  Normal or non-critical lab and imaging results will be communicated to you by MyChart, letter or phone within 4 business days after the clinic has received the results. If you do not hear from us within 7 days, please contact the clinic through [employer] or phone. If you have a critical or abnormal lab result, we will notify you by phone as soon as possible.  Submit refill requests through [employer] or call your pharmacy and they will forward the refill request to us. Please allow 3 business days for your refill to be completed.          Additional Information About Your Visit        [employer] Information     [employer] gives you secure access to your electronic health record. If you see a primary care provider, you can also send messages to your care team and make appointments. If you have questions, please call your primary care clinic.  If you do not have a primary care provider, please call [phone] and they will assist you.        Care EveryWhere ID     This is your Care EveryWhere ID. This could be used by other organizations to access your [place] medical records  [id number]        Your Vitals Were     Height BMI (Body Mass Index)                6' (1.829 m) 20.21 kg/m2           Blood Pressure from Last 3 Encounters:   05/08/17 142/68   05/05/17 146/61   05/05/17 146/61    Weight from Last 3 Encounters:   05/08/17 149 lb (67.6 kg)   05/05/17 149 lb 0.5 oz (67.6 kg)   05/05/17 149 lb 1.6 oz (67.6 kg)               Primary Care Provider Office Phone # Fax #    SHANIQUE Walsh [facility] [phone]       [facility] [address] [facility] [address]        Thank you!     Thank you for choosing [facility]  for your care. Our goal is always to provide you with excellent care. Hearing back from our patients is one way we can continue to improve our services. Please take a few minutes to complete the written survey that you may receive in  the mail after your visit with us. Thank you!             Your Updated Medication List - Protect others around you: Learn how to safely use, store and throw away your medicines at www.disposemymeds.org.          This list is accurate as of: 5/8/17 11:40 AM.  Always use your most recent med list.                   Brand Name Dispense Instructions for use    cyanocobalamin 1000 MCG tablet    vitamin  B-12         folic acid 1 MG tablet    FOLVITE         ketoconazole 2 % shampoo    NIZORAL         levothyroxine 150 MCG tablet    SYNTHROID/LEVOTHROID    90 tablet    Take 1 tablet (150 mcg) by mouth daily       sulfamethoxazole-trimethoprim 800-160 MG per tablet    BACTRIM DS/SEPTRA DS

## 2017-05-08 NOTE — Clinical Note
Dear Tricia Rivas saw me at [facility] on May 8, 2017.  Please refer to the visit note at your convenience and feel free to contact me should you have any questions.  Sincerely,  Murray Ryder DO, [facility]

## 2017-05-08 NOTE — PROGRESS NOTES
[facility]   Clinic Visit s May 8, 2017    Subjective:  Tricia Sosa is a 76 year old female who is seen in consultation at the request of Monika Whipple CNP for evaluation of chronic right hip pain.    Symptoms began 2+ years ago and has been intermittent over that time period. Reports insidious onset without acute precipitating event.  Reports right hip pain that is located anterior/deep with radiation absent.  Pain is 8/10 in maximal severity and 0/10 currently.  Symptoms are generally worse with getting in/out of a car, getting up out of her recliner, and with twisting on her right leg and better with moving carefully. No treatment initiated to date.  Denies any numbness/tingling/weakness of the right lower extremity. Denies any bowel/bladder incontinence. Denies any change of symptoms with coughing/sneezing, Denies any previous right hip injuries/surgeries.    Patient's past medical, surgical, social, and family histories were reviewed today.  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, and posterior vitreous detachment     Urinary tract infection, site not specified     Recurrent UTI's       Review of Systems:  Constitutional: NEGATIVE for fever, chills, or change in weight  Skin: NEGATIVE for worrisome rashes, moles, or lesions  Neuro: NEGATIVE for weakness of the right lower extremity  MSK: see HPI  Additional 10 point ROS is negative other than symptoms noted above and in HPI    Objective:  /68 (BP Location: Left arm, Patient Position: Chair, Cuff Size: Adult Regular)  Ht 6' (1.829 m)  Wt 149 lb (67.6 kg)  BMI 20.21 kg/m2  General: healthy, alert, and in no distress  Skin: no  suspicious lesions or rashes  Psych: mentation appears normal and affect normal/bright  HEENT: no scleral icterus  CV: no pedal edema  Resp: normal respiratory effort without conversational dyspnea   Neuro: motor strength as noted below  Lymph: no palpable lymphadenopathy    MSK:    RIGHT HIP  Inspection:    No swelling, bruising, discoloration, or obvious deformity or asymmetry  Palpation:    No tenderness over the anterior groin/joint line, pubic symphysis, or adductor group origin    Crepitus is absent  Passive Range of Motion:    Flexion within normal limits / IR within normal limits / ER limited by pain  Strength:    Flexion 5/5 / adduction 5/5   Special Tests:    Positive: JHONATAN (lateral)    Negative: Logroll, anterior impingement (FADIR), passive ER with hip flexion (Drehman's), and Gaenslen's     Imaging:  Right hip x-rays (2 views) were ordered, independent visualization of images was performed, and interpreted in the office today  Impression:   1. Right femoral head superolateral mild osteophytic spurring.  2. Negative for fracture, subluxation, joint space narrowing, or other acute osseous abnormalities.    ASSESSMENT:  1. Chronic right hip pain - differential diagnoses includes pelvic floor dysfunction, adductor tendinopathy, hernia, etc.    PLAN:  1. Activity modification as discussed, including limitation of activities that cause pain/discomfort.  2. Acetaminophen/Ibuprofen/ice as needed for improved pain control.  3. Call if interested in formal physical/pelvic floor therapy for further treatment purposes.  4. Follow-up as needed for further evaluation/medical care.  Instructed to contact our office should the condition evolve or worsen.    Patient's conditions were thoroughly discussed during today's visit with greater than 50% of the visit spent counseling the patient with total time spent face-to-face with the patient being 15 minutes.    Murray Ryder DO, CAM  [facility]    Disclaimer: This note consists of symbols derived from keyboarding, dictation and/or voice recognition software. As a result, there may be errors in the script that have gone undetected. Please consider this when interpreting information found in this chart.

## 2017-05-08 NOTE — PATIENT INSTRUCTIONS
We addressed the following today:    1. Right hip pain    Activity modification as discussed  Topical Treatments: Ice or heat  Over the counter medication: Acetaminophen (Tylenol) 1000 mg every 6 hours with food (Maximum of 3000 mg/day)  Ibuprofen (Advil) maximum of 800 mg four times a day with food  Other specific instructions: Call if interested in formal physical therapy referral for further treatment purposes  Follow up as needed for further evaluation/medical care (sooner if needed; call direct clinic number [[phone]] at any time with questions or concerns)

## 2017-06-02 ENCOUNTER — INFUSION THERAPY VISIT (OUTPATIENT)
Dept: INFUSION THERAPY | Facility: CLINIC | Age: 77
End: 2017-06-02
Attending: ALLERGY & IMMUNOLOGY
Payer: MEDICARE

## 2017-06-02 VITALS
RESPIRATION RATE: 16 BRPM | DIASTOLIC BLOOD PRESSURE: 54 MMHG | BODY MASS INDEX: 20.22 KG/M2 | HEART RATE: 89 BPM | SYSTOLIC BLOOD PRESSURE: 122 MMHG | HEIGHT: 72 IN | TEMPERATURE: 97.6 F | WEIGHT: 149.25 LBS

## 2017-06-02 DIAGNOSIS — D83.9 CVID (COMMON VARIABLE IMMUNODEFICIENCY) (H): Primary | ICD-10-CM

## 2017-06-02 PROCEDURE — 96366 THER/PROPH/DIAG IV INF ADDON: CPT

## 2017-06-02 PROCEDURE — 96375 TX/PRO/DX INJ NEW DRUG ADDON: CPT

## 2017-06-02 PROCEDURE — 96365 THER/PROPH/DIAG IV INF INIT: CPT

## 2017-06-02 PROCEDURE — 25000128 H RX IP 250 OP 636: Performed by: INTERNAL MEDICINE

## 2017-06-02 RX ORDER — DIPHENHYDRAMINE HCL 25 MG
25 CAPSULE ORAL
Status: CANCELLED | OUTPATIENT
Start: 2017-06-02

## 2017-06-02 RX ORDER — ACETAMINOPHEN 325 MG/1
650 TABLET ORAL
Status: CANCELLED
Start: 2017-06-02

## 2017-06-02 RX ADMIN — HYDROCORTISONE SODIUM SUCCINATE 100 MG: 100 INJECTION, POWDER, FOR SOLUTION INTRAMUSCULAR; INTRAVENOUS at 11:08

## 2017-06-02 RX ADMIN — HUMAN IMMUNOGLOBULIN G 35 G: 20 LIQUID INTRAVENOUS at 11:18

## 2017-06-02 RX ADMIN — SODIUM CHLORIDE 250 ML: 9 INJECTION, SOLUTION INTRAVENOUS at 11:05

## 2017-06-02 NOTE — PROGRESS NOTES
"Infusion Nursing Note:  Tricia Sosa presents today for IVIG/Solucortef.    Patient seen by provider today: No   present during visit today: Not Applicable.    Note: NA.    Intravenous Access:  No labs at this visit.  Peripheral IV placed.    Treatment Conditions:  Results reviewed, labs MET treatment parameters, ok to proceed with treatment.  Rheumatology Infusion Checklist: PRIOR TO INFUSION OF BIOLOGICAL MEDICATIONS OR ANY OF THESE AS LISTED: Immune Globulin (IVIG) \".rheumbiologicalchecklist\"    Prior to Infusion of biological medications or any of these as listed:    1. Elevated temperature, fever, chills, productive cough or abnormal vital signs, night sweats, coughing up blood or sputum, no appetite or abnormal vital signs : NO    2. Open wounds or new incisions: NO    3. Recent hospitalization: NO    4.  Recent surgeries:  NO    5. Any upcoming surgeries or dental procedures?:NO    6. Any current or recent bouts of illness or infection? On any antibiotics? : NO    7. Any new, sudden or worsening abdominal pain :NO    8. Vaccination within 4 weeks? Patient or someone in the household is scheduled to receive vaccination? No live virus vaccines prior to or during treatment :NO    9. Any nervous system diseases [i.e. multiple sclerosis, Guillain-[place], seizures, neurological  changes]: NO    10. Pregnant or breast feeding; or plans on pregnancy in the future: NO    11. Signs of worsening depression or suicidal ideations while taking benlysta:NO    12. New-onset medical symptoms: NO    13.  New cancer diagnosis or on chemotherapy or radiation NO    14.  Evaluate for any sign of active TB [Unexplained weight loss, Loss of appetite, Night sweats, Fever, Fatigue, Chills, Coughing for 3 weeks or longer, Hemoptysis (coughing up blood), Chest pain]: NO    Patient here for IVIG     Privigen 35 g     IVIG started at 0.5 cc/kg/hr. Increased by 0.5cc/kg/hr every 15 minutes for max rate of 270.8 cc/hr.      Post " Infusion Assessment:  Patient tolerated infusion without incident.  Blood return noted pre and post infusion.  Site patent and intact, free from redness, edema or discomfort.  Access discontinued per protocol.    Discharge Plan:   Discharge instructions reviewed with: Patient.  Patient and/or family verbalized understanding of discharge instructions and all questions answered.  Copy of AVS reviewed with patient and/or family.  Patient will return 6/27/17 for next appointment.  Patient discharged in stable condition accompanied by: self.  Departure Mode: Ambulatory.    Ewa High RN

## 2017-06-02 NOTE — MR AVS SNAPSHOT
After Visit Summary   6/2/2017    Tricia Sosa    MRN: [id number]           Patient Information     Date Of Birth          1940        Visit Information        Provider Department      6/2/2017 10:30 AM RH INFUSION CHAIR 5 [facility] Infusion [facility]        Today's Diagnoses     CVID (common variable immunodeficiency) (H)    -  1       Follow-ups after your visit        Your next 10 appointments already scheduled     Jun 27, 2017 10:30 AM CDT   Level 5 with RH INFUSION CHAIR 12   [facility] Infusion Services ([facility])    [facility]  [address]  [facility] [address] [phone]            Jun 27, 2017 10:45 AM CDT   Return Visit with Jose Summers MD   [facility] ([facility])    [facility]  [address]  [facility] [address] [phone]              Who to contact     If you have questions or need follow up information about today's clinic visit or your schedule please contact [facility] INFUSION SERVICES directly at [phone].  Normal or non-critical lab and imaging results will be communicated to you by MyChart, letter or phone within 4 business days after the clinic has received the results. If you do not hear from us within 7 days, please contact the clinic through [employer] Boph[employer] or phone. If you have a critical or abnormal lab result, we will notify you by phone as soon as possible.  Submit refill requests through DTVCast or call your pharmacy and they will forward the refill request to us. Please allow 3 business days for your refill to be completed.          Additional Information About Your Visit        MyChart Information     DTVCast gives you secure access to your electronic health record. If you see a primary care provider, you can also send messages to your care team and make appointments.  "If you have questions, please call your primary care clinic.  If you do not have a primary care provider, please call [phone] and they will assist you.        Care EveryWhere ID     This is your Care EveryWhere ID. This could be used by other organizations to access your [place] medical records  [id number]        Your Vitals Were     Pulse Temperature Respirations Height BMI (Body Mass Index)       89 97.6  F (36.4  C) (Tympanic) 16 1.829 m (6' 0.01\") 20.24 kg/m2        Blood Pressure from Last 3 Encounters:   06/02/17 122/54   05/08/17 142/68   05/05/17 146/61    Weight from Last 3 Encounters:   06/02/17 67.7 kg (149 lb 4 oz)   05/08/17 67.6 kg (149 lb)   05/05/17 67.6 kg (149 lb 0.5 oz)              Today, you had the following     No orders found for display       Primary Care Provider Office Phone # Fax #    SHANIQUE [facility] [phone]       [facility] [address] [address]        Thank you!     Thank you for choosing [facility] INFUSION SERVICES  for your care. Our goal is always to provide you with excellent care. Hearing back from our patients is one way we can continue to improve our services. Please take a few minutes to complete the written survey that you may receive in the mail after your visit with us. Thank you!             Your Updated Medication List - Protect others around you: Learn how to safely use, store and throw away your medicines at www.disposemymeds.org.          This list is accurate as of: 6/2/17  3:37 PM.  Always use your most recent med list.                   Brand Name Dispense Instructions for use    cyanocobalamin 1000 MCG tablet    vitamin  B-12         folic acid 1 MG tablet    FOLVITE         ketoconazole 2 % shampoo    NIZORAL         levothyroxine 150 MCG tablet    SYNTHROID/LEVOTHROID    90 tablet    Take 1 tablet (150 mcg) by mouth daily       sulfamethoxazole-trimethoprim 800-160 MG per tablet    " BACTRIM DS/SEPTRA DS

## 2017-06-19 DIAGNOSIS — D83.9 CVID (COMMON VARIABLE IMMUNODEFICIENCY) (H): ICD-10-CM

## 2017-06-19 DIAGNOSIS — E53.8 B12 DEFICIENCY: ICD-10-CM

## 2017-06-19 DIAGNOSIS — D59.19 OTHER AUTOIMMUNE HEMOLYTIC ANEMIAS: ICD-10-CM

## 2017-06-19 DIAGNOSIS — E03.9 HYPOTHYROIDISM, UNSPECIFIED TYPE: ICD-10-CM

## 2017-06-19 LAB
ALBUMIN SERPL-MCNC: 3.9 G/DL (ref 3.4–5)
ALP SERPL-CCNC: 95 U/L (ref 40–150)
ALT SERPL W P-5'-P-CCNC: 35 U/L (ref 0–50)
ANION GAP SERPL CALCULATED.3IONS-SCNC: 8 MMOL/L (ref 3–14)
AST SERPL W P-5'-P-CCNC: 49 U/L (ref 0–45)
BASOPHILS # BLD AUTO: 0 10E9/L (ref 0–0.2)
BASOPHILS NFR BLD AUTO: 0.3 %
BILIRUB SERPL-MCNC: 0.8 MG/DL (ref 0.2–1.3)
BUN SERPL-MCNC: 9 MG/DL (ref 7–30)
CALCIUM SERPL-MCNC: 8.6 MG/DL (ref 8.5–10.1)
CHLORIDE SERPL-SCNC: 107 MMOL/L (ref 94–109)
CO2 SERPL-SCNC: 28 MMOL/L (ref 20–32)
CREAT SERPL-MCNC: 0.74 MG/DL (ref 0.52–1.04)
DIFFERENTIAL METHOD BLD: ABNORMAL
EOSINOPHIL # BLD AUTO: 0.1 10E9/L (ref 0–0.7)
EOSINOPHIL NFR BLD AUTO: 2.9 %
ERYTHROCYTE [DISTWIDTH] IN BLOOD BY AUTOMATED COUNT: 13.1 % (ref 10–15)
GFR SERPL CREATININE-BSD FRML MDRD: 77 ML/MIN/1.7M2
GLUCOSE SERPL-MCNC: 89 MG/DL (ref 70–99)
HCT VFR BLD AUTO: 35.7 % (ref 35–47)
HGB BLD-MCNC: 12 G/DL (ref 11.7–15.7)
LDH SERPL L TO P-CCNC: 394 U/L (ref 81–234)
LYMPHOCYTES # BLD AUTO: 0.6 10E9/L (ref 0.8–5.3)
LYMPHOCYTES NFR BLD AUTO: 18.3 %
MCH RBC QN AUTO: 29.8 PG (ref 26.5–33)
MCHC RBC AUTO-ENTMCNC: 33.6 G/DL (ref 31.5–36.5)
MCV RBC AUTO: 89 FL (ref 78–100)
MONOCYTES # BLD AUTO: 0.5 10E9/L (ref 0–1.3)
MONOCYTES NFR BLD AUTO: 14.2 %
NEUTROPHILS # BLD AUTO: 2.2 10E9/L (ref 1.6–8.3)
NEUTROPHILS NFR BLD AUTO: 64.3 %
PLATELET # BLD AUTO: 157 10E9/L (ref 150–450)
POTASSIUM SERPL-SCNC: 4 MMOL/L (ref 3.4–5.3)
PROT SERPL-MCNC: 6.8 G/DL (ref 6.8–8.8)
RBC # BLD AUTO: 4.03 10E12/L (ref 3.8–5.2)
RETICS # AUTO: 52.7 10E9/L (ref 25–95)
RETICS/RBC NFR AUTO: 1.3 % (ref 0.5–2)
SODIUM SERPL-SCNC: 143 MMOL/L (ref 133–144)
TSH SERPL DL<=0.005 MIU/L-ACNC: 1.82 MU/L (ref 0.4–4)
VIT B12 SERPL-MCNC: 712 PG/ML (ref 193–986)
WBC # BLD AUTO: 3.4 10E9/L (ref 4–11)

## 2017-06-19 PROCEDURE — 83010 ASSAY OF HAPTOGLOBIN QUANT: CPT | Performed by: NURSE PRACTITIONER

## 2017-06-19 PROCEDURE — 85045 AUTOMATED RETICULOCYTE COUNT: CPT | Performed by: NURSE PRACTITIONER

## 2017-06-19 PROCEDURE — 83615 LACTATE (LD) (LDH) ENZYME: CPT | Performed by: NURSE PRACTITIONER

## 2017-06-19 PROCEDURE — 36415 COLL VENOUS BLD VENIPUNCTURE: CPT | Performed by: NURSE PRACTITIONER

## 2017-06-19 PROCEDURE — 82607 VITAMIN B-12: CPT | Performed by: NURSE PRACTITIONER

## 2017-06-19 PROCEDURE — 85060 BLOOD SMEAR INTERPRETATION: CPT | Performed by: INTERNAL MEDICINE

## 2017-06-19 PROCEDURE — 80050 GENERAL HEALTH PANEL: CPT | Performed by: NURSE PRACTITIONER

## 2017-06-19 PROCEDURE — 82784 ASSAY IGA/IGD/IGG/IGM EACH: CPT

## 2017-06-20 LAB
COPATH REPORT: NORMAL
HAPTOGLOB SERPL-MCNC: <6 MG/DL (ref 35–175)
IGG SERPL-MCNC: 1050 MG/DL (ref 695–1620)
IGM SERPL-MCNC: 110 MG/DL (ref 60–265)

## 2017-06-27 ENCOUNTER — ONCOLOGY VISIT (OUTPATIENT)
Dept: ONCOLOGY | Facility: CLINIC | Age: 77
End: 2017-06-27
Attending: INTERNAL MEDICINE
Payer: MEDICARE

## 2017-06-27 ENCOUNTER — INFUSION THERAPY VISIT (OUTPATIENT)
Dept: INFUSION THERAPY | Facility: CLINIC | Age: 77
End: 2017-06-27
Attending: ALLERGY & IMMUNOLOGY
Payer: MEDICARE

## 2017-06-27 VITALS
RESPIRATION RATE: 16 BRPM | TEMPERATURE: 98.4 F | BODY MASS INDEX: 20.01 KG/M2 | OXYGEN SATURATION: 97 % | WEIGHT: 147.6 LBS | DIASTOLIC BLOOD PRESSURE: 44 MMHG | HEART RATE: 56 BPM | SYSTOLIC BLOOD PRESSURE: 120 MMHG

## 2017-06-27 VITALS
HEART RATE: 56 BPM | RESPIRATION RATE: 16 BRPM | BODY MASS INDEX: 20.01 KG/M2 | TEMPERATURE: 98.4 F | OXYGEN SATURATION: 97 % | WEIGHT: 147.6 LBS | DIASTOLIC BLOOD PRESSURE: 44 MMHG | SYSTOLIC BLOOD PRESSURE: 120 MMHG

## 2017-06-27 DIAGNOSIS — D83.9 CVID (COMMON VARIABLE IMMUNODEFICIENCY) (H): Primary | ICD-10-CM

## 2017-06-27 DIAGNOSIS — E03.8 OTHER SPECIFIED HYPOTHYROIDISM: ICD-10-CM

## 2017-06-27 DIAGNOSIS — D59.19 OTHER AUTOIMMUNE HEMOLYTIC ANEMIAS: ICD-10-CM

## 2017-06-27 PROCEDURE — 96375 TX/PRO/DX INJ NEW DRUG ADDON: CPT

## 2017-06-27 PROCEDURE — 99213 OFFICE O/P EST LOW 20 MIN: CPT | Performed by: INTERNAL MEDICINE

## 2017-06-27 PROCEDURE — 96366 THER/PROPH/DIAG IV INF ADDON: CPT

## 2017-06-27 PROCEDURE — 25000128 H RX IP 250 OP 636: Performed by: INTERNAL MEDICINE

## 2017-06-27 PROCEDURE — 96365 THER/PROPH/DIAG IV INF INIT: CPT

## 2017-06-27 RX ORDER — DIPHENHYDRAMINE HCL 25 MG
25 CAPSULE ORAL
Status: CANCELLED | OUTPATIENT
Start: 2017-06-27

## 2017-06-27 RX ORDER — ACETAMINOPHEN 325 MG/1
650 TABLET ORAL
Status: CANCELLED
Start: 2017-06-27

## 2017-06-27 RX ADMIN — HYDROCORTISONE SODIUM SUCCINATE 100 MG: 100 INJECTION, POWDER, FOR SOLUTION INTRAMUSCULAR; INTRAVENOUS at 11:02

## 2017-06-27 RX ADMIN — SODIUM CHLORIDE 250 ML: 9 INJECTION, SOLUTION INTRAVENOUS at 11:02

## 2017-06-27 RX ADMIN — HUMAN IMMUNOGLOBULIN G 35 G: 20 LIQUID INTRAVENOUS at 11:03

## 2017-06-27 NOTE — PROGRESS NOTES
"Infusion Nursing Note:  Tricia Sosa presents today for IVIG.    Patient seen by provider today: Yes: Dr. Summers   present during visit today: Not Applicable.    Note: IVIG increased by 0.5 ml/kg/hr every 15 minutes with max of 4 ml/kg/hr.    Intravenous Access:  Peripheral IV placed.    Treatment Conditions:  Rheumatology Infusion Checklist: PRIOR TO INFUSION OF BIOLOGICAL MEDICATIONS OR ANY OF THESE AS LISTED: Immune Globulin (IVIG) \".rheumbiologicalchecklist\"    Prior to Infusion of biological medications or any of these as listed:    1. Elevated temperature, fever, chills, productive cough or abnormal vital signs, night sweats, coughing up blood or sputum, no appetite or abnormal vital signs : NO    2. Open wounds or new incisions: NO    3. Recent hospitalization: NO    4.  Recent surgeries:  NO    5. Any upcoming surgeries or dental procedures?:NO    6. Any current or recent bouts of illness or infection? On any antibiotics? : NO    7. Any new, sudden or worsening abdominal pain :NO    8. Vaccination within 4 weeks? Patient or someone in the household is scheduled to receive vaccination? No live virus vaccines prior to or during treatment :NO    9. Any nervous system diseases [i.e. multiple sclerosis, Guillain-[place], seizures, neurological  changes]: NO    10. Pregnant or breast feeding; or plans on pregnancy in the future: NO    11. Signs of worsening depression or suicidal ideations while taking benlysta:NO    12. New-onset medical symptoms: NO    13.  New cancer diagnosis or on chemotherapy or radiation NO    14.  Evaluate for any sign of active TB [Unexplained weight loss, Loss of appetite, Night sweats, Fever, Fatigue, Chills, Coughing for 3 weeks or longer, Hemoptysis (coughing up blood), Chest pain]: NO    **Note: If answered yes to any of the above, hold the infusion and contact ordering rheumatologist or on-call rheumatologist.   .      Post Infusion Assessment:  Patient tolerated infusion " without incident.  Blood return noted pre and post infusion.  Site patent and intact, free from redness, edema or discomfort.  No evidence of extravasations.  Access discontinued per protocol.  Rheumatology Post Infusion: POST-INFUSION OF BIOLOGICAL MEDICATION:    Reviewed with patient.  Given biologic medication or medication hand-out. Inform patient if any fever, chills or signs of infection, new symptoms, abdominal pain, heart palpitations, shortness of breath, reaction, weakness, neurological changes, seek medical attention immediately and should not receive infusions. No live virus vaccines prior to or during treatment or up to 6 months post infusion. If the patient has an upcoming procedure or surgery, this should be discussed with the rheumatologist and surgeon or provider..    Discharge Plan:   Patient declined prescription refills.  Discharge instructions reviewed with: Patient.  Patient and/or family verbalized understanding of discharge instructions and all questions answered.  Copy of AVS reviewed with patient and/or family.   Patient discharged in stable condition accompanied by: self.  Departure Mode: Ambulatory.    Jocelyn Riggs RN

## 2017-06-27 NOTE — MR AVS SNAPSHOT
After Visit Summary   6/27/2017    Tricia Sosa    MRN: [id number]           Patient Information     Date Of Birth          1940        Visit Information        Provider Department      6/27/2017 10:30 AM RH INFUSION CHAIR 12 [facility] Infusion Services        Today's Diagnoses     CVID (common variable immunodeficiency) (H)    -  1       Follow-ups after your visit        Your next 10 appointments already scheduled     Jul 18, 2017 10:30 AM CDT   Level 5 with RH INFUSION CHAIR 9   [facility] Infusion Services ([facility])    [facility]  [address]  [facility] [address]   [phone]            Aug 08, 2017 10:30 AM CDT   Level 5 with RH INFUSION CHAIR 9   [facility] Infusion Services ([facility])    [facility]  [address]  [facility] [address]   [phone]            Aug 29, 2017 10:30 AM CDT   Level 5 with RH INFUSION CHAIR 11   [facility] Infusion Services ([facility])    [facility]  [address]  [facility] [address]   [phone]            Sep 11, 2017 10:00 AM CDT   LAB with  LAB   [facility] ([facility]    [address] [facility] [address] [phone]           Patient must bring picture ID.  Patient should be prepared to give a urine specimen  Please do not eat 10-12 hours before your appointment if you are coming in fasting for labs on lipids, cholesterol, or glucose (sugar).  Pregnant women should follow their Care Team instructions. Water with medications is okay. Do not drink coffee or other fluids.   If you have concerns about taking  your medications, please ask at office or if scheduling via KeyMe, send a message by clicking on Secure Messaging, Message Your Care Team.            Sep 19, 2017  10:30 AM CDT   Level 5 with RH INFUSION CHAIR 10   [facility] In[facility] ([facility])    [facility]  [address]  [facility] [address] [phone]            Sep 19, 2017 10:45 AM CDT   Return Visit with Jose Summers MD   [facility] ([facility])    [facility]  [address]  [facility] [address] [phone]              Who to contact     If you have questions or need follow up information about today's clinic visit or your schedule please contact [facility] INFUSION SERVICES directly at [phone].  Normal or non-critical lab and imaging results will be communicated to you by 25eighthart, letter or phone within 4 business days after the clinic has received the results. If you do not hear from us within 7 days, please contact the clinic through 25eighthart or phone. If you have a critical or abnormal lab result, we will notify you by phone as soon as possible.  Submit refill requests through [employer] or call your pharmacy and they will forward the refill request to us. Please allow 3 business days for your refill to be completed.          Additional Information About Your Visit        25eighthart Information     [employer] gives you secure access to your electronic health record. If you see a primary care provider, you can also send messages to your care team and make appointments. If you have questions, please call your primary care clinic.  If you do not have a primary care provider, please call [phone] and they will assist you.        Care EveryWhere ID     This is your Care EveryWhere ID. This could be used by other organizations to access your [place] medical records  [id number]        Your Vitals Were     Pulse Temperature Respirations Pulse Oximetry BMI (Body Mass Index)       56 98.4  F (36.9  C) (Tympanic) 16 97% 20.01 kg/m2         Blood Pressure from Last 3 Encounters:   06/27/17 120/44   06/27/17 120/44   06/02/17 122/54    Weight from Last 3 Encounters:   06/27/17 67 kg (147 lb 9.6 oz)   06/27/17 67 kg (147 lb 9.6 oz)   06/02/17 67.7 kg (149 lb 4 oz)              Today, you had the following     No orders found for display       Primary Care Provider Office Phone # Fax #    Monika Whipple SHANIQUE [facility] [phone]       [facility] [place] [address] [facility]  [place] [address]        Equal Access to Services     [facility]: Hadii aad ku hadasho Soomaali, waaxda luqadaha, qaybta kaalmada adeegyada, waxclement santiago haycarol ackerman . So [facility] [phone].    ATENCIÓN: Si habla español, tiene a dial disposición servicios gratuitos de asistencia lingüística. Llame al [phone].    We comply with applicable federal civil rights laws and [place] laws. We do not discriminate on the basis of race, color, national origin, age, disability sex, sexual orientation or gender identity.            Thank you!     Thank you for choosing [facility] INFUSION SERVICES  for your care. Our goal is always to provide you with excellent care. Hearing back from our patients is one way we can continue to improve our services. Please take a few minutes to complete the written survey that you may receive in the mail after your visit with us. Thank you!             Your Updated Medication List - Protect others around you: Learn how to safely use, store and throw away your medicines at www.disposemymeds.org.          This list is accurate as of: 6/27/17  1:27 PM.  Always use your most recent med list.                   Brand Name Dispense Instructions for use Diagnosis    cyanocobalamin 1000 MCG tablet    vitamin  B-12      Anemia, Back pain       folic acid 1 MG tablet    FOLVITE          ketoconazole 2 % shampoo    NIZORAL          levothyroxine 150 MCG tablet    SYNTHROID/LEVOTHROID    90 tablet    Take 1 tablet (150  mcg) by mouth daily    Hypothyroidism, unspecified type       sulfamethoxazole-trimethoprim 800-160 MG per tablet    BACTRIM DS/SEPTRA DS

## 2017-06-27 NOTE — PATIENT INSTRUCTIONS
Follow up in 12 weeks with labs prior to visit to see me. Scheduled  Aura KELLEY      CBC w Diff, CMP and LDH; B12, retic, ferritin, iron panel, Ig subsets, haptoglobin  Scheduled  Aura KELLEY    Continue Ig infusions as current Scheduled  Aura KELLEY  AVS given to patient

## 2017-06-27 NOTE — MR AVS SNAPSHOT
After Visit Summary   6/27/2017    Tricia Sosa    MRN: [id number]           Patient Information     Date Of Birth          1940        Visit Information        Provider Department      6/27/2017 10:45 AM Jose Summers MD [facility]        Care Instructions    Follow up in 12 weeks with labs prior to visit to see me. Scheduled  Aura KELLEY      CBC w Diff, CMP and LDH; B12, retic, ferritin, iron panel, Ig subsets, haptoglobin  Scheduled  Aura KELLEY    Continue Ig infusions as current Scheduled  Aura KELLEY  AVS given to patient            Follow-ups after your visit        Your next 10 appointments already scheduled     Jul 18, 2017 10:30 AM CDT   Level 5 with RH INFUSION CHAIR 9   [facility] Infusion Services ([facility])    [facility]  [address]  [facility] [address]   [phone]            Aug 08, 2017 10:30 AM CDT   Level 5 with RH INFUSION CHAIR 9   [facility] Infusion Services ([facility])    [facility]  [address]  [facility] [address]   [phone]            Aug 29, 2017 10:30 AM CDT   Level 5 with RH INFUSION CHAIR 11   [facility] Infusion Services ([facility])    [facility]  [address]  [facility] [address]   [phone]            Sep 11, 2017 10:00 AM CDT   LAB with  LAB   [facility]    [address] [facility] [address] [phone]           Patient must bring picture ID.  Patient should be prepared to give a urine specimen  Please do not eat 10-12 hours before your appointment if you are coming in fasting for labs on lipids, cholesterol, or glucose (sugar).  Pregnant women should follow their Care Team instructions. Water with medications is okay. Do not drink coffee or other  fluids.   If you have concerns about taking  your medications, please ask at office or if scheduling via Arradiance, send a message by clicking on Secure Messaging, Message Your Care Team.            Sep 19, 2017 10:30 AM CDT   Level 5 with RH INFUSION CHAIR 10   [facility] Infusion Services ([facility])    [facility]  [id number] [place] [address] [address]  [facility] [address] [phone]            Sep 19, 2017 10:45 AM CDT   Return Visit with Jose Summers MD   [facility] ([facility])    [facility]  [address]  [facility] [address] [phone]              Who to contact     If you have questions or need follow up information about today's clinic visit or your schedule please contact [facility] CAN[facility]ER C[facility] directly at [phone].  Normal or non-critical lab and imaging results will be communicated to you by Haute Apphart, letter or phone within 4 business days after the clinic has received the results. If you do not hear from us within 7 days, please contact the clinic through [employer] or phone. If you have a critical or abnormal lab result, we will notify you by phone as soon as possible.  Submit refill requests through Arradiance or call your pharmacy and they will forward the refill request to us. Please allow 3 business days for your refill to be completed.          Additional Information About Your Visit        Haute Apphart Information     [employer] gives you secure access to your electronic health record. If you see a primary care provider, you can also send messages to your care team and make appointments. If you have questions, please call your primary care clinic.  If you do not have a primary care provider, please call [phone] and they will assist you.        Care EveryWhere ID     This is your Care EveryWhere ID. This could be used by other organizations to access your  [place] medical records  [id number]        Your Vitals Were     Pulse Temperature Respirations Pulse Oximetry BMI (Body Mass Index)       56 98.4  F (36.9  C) (Tympanic) 16 97% 20.01 kg/m2        Blood Pressure from Last 3 Encounters:   06/27/17 120/44   06/27/17 120/44   06/02/17 122/54    Weight from Last 3 Encounters:   06/27/17 67 kg (147 lb 9.6 oz)   06/27/17 67 kg (147 lb 9.6 oz)   06/02/17 67.7 kg (149 lb 4 oz)              Today, you had the following     No orders found for display       Primary Care Provider Office Phone # Fax #    Monika SHANIQUE [facility] [phone]       [facility] [place] [address]  [place] [address]        Equal Access to Services     ZIA HENRIQUEZ : Hadii aad ku hadasho Soomaali, waaxda luqadaha, qaybta kaalmada adeegyada, naya dossin hayaavipin ackerman . So [facility] [phone].    ATENCIÓN: Si habla español, tiene a dial disposición servicios gratuitos de asistencia lingüística. Llame al [phone].    We comply with applicable federal civil rights laws and [place] laws. We do not discriminate on the basis of race, color, national origin, age, disability sex, sexual orientation or gender identity.            Thank you!     Thank you for choosing [facility]  for your care. Our goal is always to provide you with excellent care. Hearing back from our patients is one way we can continue to improve our services. Please take a few minutes to complete the written survey that you may receive in the mail after your visit with us. Thank you!             Your Updated Medication List - Protect others around you: Learn how to safely use, store and throw away your medicines at www.disposemymeds.org.          This list is accurate as of: 6/27/17 11:53 AM.  Always use your most recent med list.                   Brand Name Dispense Instructions for use Diagnosis    cyanocobalamin 1000 MCG tablet    vitamin  B-12      Anemia, Back  pain       folic acid 1 MG tablet    FOLVITE          ketoconazole 2 % shampoo    NIZORAL          levothyroxine 150 MCG tablet    SYNTHROID/LEVOTHROID    90 tablet    Take 1 tablet (150 mcg) by mouth daily    Hypothyroidism, unspecified type       sulfamethoxazole-trimethoprim 800-160 MG per tablet    BACTRIM DS/SEPTRA DS

## 2017-07-05 DIAGNOSIS — E03.9 HYPOTHYROIDISM, UNSPECIFIED TYPE: ICD-10-CM

## 2017-07-06 NOTE — TELEPHONE ENCOUNTER
levothyroxine (SYNTHROID/LEVOTHROID) 150 MCG     Last Written Prescription Date: 4/11/17  Last Quantity: 90, # refills: 0  Last Office Visit with ANASTASIA, KASSIDY or [facility] prescribing provider: 4/21/17   Next 5 appointments (look out 90 days)     Sep 19, 2017 10:45 AM CDT   Return Visit with Jose Summers MD   [facility] ([facility])    [facility]  [address]  [facility] [address]  [facility] [address] [phone]                   TSH   Date Value Ref Range Status   06/19/2017 1.82 0.40 - 4.00 mU/L Final

## 2017-07-07 RX ORDER — LEVOTHYROXINE SODIUM 150 UG/1
TABLET ORAL
Qty: 90 TABLET | Refills: 2 | Status: SHIPPED | OUTPATIENT
Start: 2017-07-07 | End: 2018-04-07

## 2017-07-07 NOTE — TELEPHONE ENCOUNTER
Prescription approved per [facility] Refill Protocol.  Jacqui Castle RN

## 2017-07-25 ENCOUNTER — INFUSION THERAPY VISIT (OUTPATIENT)
Dept: INFUSION THERAPY | Facility: CLINIC | Age: 77
End: 2017-07-25
Attending: INTERNAL MEDICINE
Payer: MEDICARE

## 2017-07-25 VITALS
OXYGEN SATURATION: 95 % | DIASTOLIC BLOOD PRESSURE: 50 MMHG | HEART RATE: 61 BPM | RESPIRATION RATE: 16 BRPM | SYSTOLIC BLOOD PRESSURE: 114 MMHG | TEMPERATURE: 96.3 F

## 2017-07-25 DIAGNOSIS — D83.9 CVID (COMMON VARIABLE IMMUNODEFICIENCY) (H): Primary | ICD-10-CM

## 2017-07-25 PROCEDURE — 96365 THER/PROPH/DIAG IV INF INIT: CPT

## 2017-07-25 PROCEDURE — 96375 TX/PRO/DX INJ NEW DRUG ADDON: CPT

## 2017-07-25 PROCEDURE — 25000128 H RX IP 250 OP 636: Performed by: INTERNAL MEDICINE

## 2017-07-25 PROCEDURE — 96366 THER/PROPH/DIAG IV INF ADDON: CPT

## 2017-07-25 RX ORDER — ACETAMINOPHEN 325 MG/1
650 TABLET ORAL
Status: CANCELLED
Start: 2017-07-25

## 2017-07-25 RX ORDER — DIPHENHYDRAMINE HCL 25 MG
25 CAPSULE ORAL
Status: CANCELLED | OUTPATIENT
Start: 2017-07-25

## 2017-07-25 RX ADMIN — HYDROCORTISONE SODIUM SUCCINATE 100 MG: 100 INJECTION, POWDER, FOR SOLUTION INTRAMUSCULAR; INTRAVENOUS at 10:45

## 2017-07-25 RX ADMIN — HUMAN IMMUNOGLOBULIN G 35 G: 20 LIQUID INTRAVENOUS at 10:50

## 2017-07-25 NOTE — MR AVS SNAPSHOT
After Visit Summary   7/25/2017    Tricia Sosa    MRN: [id number]           Patient Information     Date Of Birth          1940        Visit Information        Provider Department      7/25/2017 10:30 AM RH INFUSION CHAIR 6 [facility] Infusion [facility]        Today's Diagnoses     CVID (common variable immunodeficiency) (H)    -  1       Follow-ups after your visit        Your next 10 appointments already scheduled     Aug 22, 2017 10:30 AM CDT   Level 5 with RH INFUSION CHAIR 7   [facility] Infusion [facility] ([facility])    [facility]  [address]  [place] [address]   [phone]            Sep 11, 2017 10:00 AM CDT   LAB with  LAB   [facility] ([facility])    [address] [address] [phone]           Patient must bring picture ID.  Patient should be prepared to give a urine specimen  Please do not eat 10-12 hours before your appointment if you are coming in fasting for labs on lipids, cholesterol, or glucose (sugar).  Pregnant women should follow their Care Team instructions. Water with medications is okay. Do not drink coffee or other fluids.   If you have concerns about taking  your medications, please ask at office or if scheduling via UseTogethert, send a message by clicking on Secure Messaging, Message Your Care Team.            Sep 19, 2017 10:30 AM CDT   Level 5 with  INFUSION CHAIR 10   [facility] Infusion [facility] ([facility])    [facility]  [address]  [place] [address] [phone]            Sep 19, 2017 10:45 AM CDT   Return Visit with Jose Summers MD   [facility] ([facility])    [facility]  [address]  [place] [address]   [phone]              Who to contact      If you have questions or need follow up information about today's clinic visit or your schedule please contact [facility] INFUSION SERVICES directly at [phone].  Normal or non-critical lab and imaging results will be communicated to you by MyChart, letter or phone within 4 business days after the clinic has received the results. If you do not hear from us within 7 days, please contact the clinic through SnapOnehart or phone. If you have a critical or abnormal lab result, we will notify you by phone as soon as possible.  Submit refill requests through Novaled or call your pharmacy and they will forward the refill request to us. Please allow 3 business days for your refill to be completed.          Additional Information About Your Visit        [employer] Information     Novaled gives you secure access to your electronic health record. If you see a primary care provider, you can also send messages to your care team and make appointments. If you have questions, please call your primary care clinic.  If you do not have a primary care provider, please call [phone] and they will assist you.        Care EveryWhere ID     This is your Care EveryWhere ID. This could be used by other organizations to access your [place] medical records  [id number]        Your Vitals Were     Pulse Temperature Respirations Pulse Oximetry          61 96.3  F (35.7  C) (Tympanic) 16 95%         Blood Pressure from Last 3 Encounters:   07/25/17 114/50   06/27/17 120/44   06/27/17 120/44    Weight from Last 3 Encounters:   06/27/17 67 kg (147 lb 9.6 oz)   06/27/17 67 kg (147 lb 9.6 oz)   06/02/17 67.7 kg (149 lb 4 oz)              Today, you had the following     No orders found for display       Primary Care Provider Office Phone # Fax #    SHANIQUE Walsh Ra, [phone]       [facility] [address]  [facility] [address]        Equal Access to Services     ZIA HENRIQUEZ AH: Joey gillis  gabriel Serna, walesada elizabethpoly, qalandry kabarbara keeshamo, waxclement selamin hayaavipin thaomert lenojennbillie caoYusracarol carolyn. So [facility] [phone].    ATENCIÓN: Si habla maritza, tiene a dial disposición servicios gratuitos de asistencia lingüística. Carlos Eduardo al [phone].    We comply with applicable federal civil rights laws and [place] laws. We do not discriminate on the basis of race, color, national origin, age, disability sex, sexual orientation or gender identity.            Thank you!     Thank you for choosing [facility] INFUSION SERVICES  for your care. Our goal is always to provide you with excellent care. Hearing back from our patients is one way we can continue to improve our services. Please take a few minutes to complete the written survey that you may receive in the mail after your visit with us. Thank you!             Your Updated Medication List - Protect others around you: Learn how to safely use, store and throw away your medicines at www.disposemymeds.org.          This list is accurate as of: 7/25/17  1:35 PM.  Always use your most recent med list.                   Brand Name Dispense Instructions for use Diagnosis    cyanocobalamin 1000 MCG tablet    vitamin  B-12      Anemia, Back pain       folic acid 1 MG tablet    FOLVITE          ketoconazole 2 % shampoo    NIZORAL          levothyroxine 150 MCG tablet    SYNTHROID/LEVOTHROID    90 tablet    TAKE 1 TABLET BY MOUTH ONE TIME DAILY    Hypothyroidism, unspecified type       sulfamethoxazole-trimethoprim 800-160 MG per tablet    BACTRIM DS/SEPTRA DS

## 2017-07-25 NOTE — PROGRESS NOTES
"Infusion Nursing Note:  Tricia Sosa presents today for IVIG.    Patient seen by provider today: No   present during visit today: Not Applicable.    Note: Infusion started at 0.5 ml/kg/hr  Increased by 0.5 ml/kg/hr every 15 minutes to max of 4 ml/kg/hr       Intravenous Access:  Peripheral IV placed.    Treatment Conditions:  Rheumatology Infusion Checklist: PRIOR TO INFUSION OF BIOLOGICAL MEDICATIONS OR ANY OF THESE AS LISTED: Immune Globulin (IVIG) \".rheumbiologicalchecklist\"    Prior to Infusion of biological medications or any of these as listed:    1. Elevated temperature, fever, chills, productive cough or abnormal vital signs, night sweats, coughing up blood or sputum, no appetite or abnormal vital signs : NO    2. Open wounds or new incisions: NO    3. Recent hospitalization: NO    4.  Recent surgeries:  NO    5. Any upcoming surgeries or dental procedures?:NO    6. Any current or recent bouts of illness or infection? On any antibiotics? : NO    7. Any new, sudden or worsening abdominal pain :NO    8. Vaccination within 4 weeks? Patient or someone in the household is scheduled to receive vaccination? No live virus vaccines prior to or during treatment :NO    9. Any nervous system diseases [i.e. multiple sclerosis, Guillain-[place], seizures, neurological  changes]: NO    10. Pregnant or breast feeding; or plans on pregnancy in the future: NO    11. Signs of worsening depression or suicidal ideations while taking benlysta:NO    12. New-onset medical symptoms: NO    13.  New cancer diagnosis or on chemotherapy or radiation NO    14.  Evaluate for any sign of active TB [Unexplained weight loss, Loss of appetite, Night sweats, Fever, Fatigue, Chills, Coughing for 3 weeks or longer, Hemoptysis (coughing up blood), Chest pain]: NO    **Note: If answered yes to any of the above, hold the infusion and contact ordering rheumatologist or on-call rheumatologist.   .        Post Infusion Assessment:  Patient " tolerated infusion without incident.  Site patent and intact, free from redness, edema or discomfort.  No evidence of extravasations.  Access discontinued per protocol.  Rheumatology Post Infusion: POST-INFUSION OF BIOLOGICAL MEDICATION:    Reviewed with patient.  Given biologic medication or medication hand-out. Inform patient if any fever, chills or signs of infection, new symptoms, abdominal pain, heart palpitations, shortness of breath, reaction, weakness, neurological changes, seek medical attention immediately and should not receive infusions. No live virus vaccines prior to or during treatment or up to 6 months post infusion. If the patient has an upcoming procedure or surgery, this should be discussed with the rheumatologist and surgeon or provider..      Discharge Plan:   Patient and/or family verbalized understanding of discharge instructions and all questions answered.  AVS to patient via [email or web address].  Patient will return 8/22 for next appointment.   Patient discharged in stable condition accompanied by: self.  Departure Mode: Ambulatory.    Serina Otto RN

## 2017-07-31 NOTE — PROGRESS NOTES
[facility] CANCER CLINIC  FOLLOW-UP VISIT NOTE    PATIENT NAME: Tricia Sosa MRN # [id number]  DATE OF VISIT: Jun 27, 2017 YOB: 1940    REFERRING PROVIDER: No referring provider defined for this encounter.    DIAGNOSIS: Hemolytic anemia-autoimmune; IgA deficiency    TREATMENT SUMMARY:  Tricia has been diagnosed with IgA deficiency since her around 2000.  She was very sick at the time and had severe diarrhea.  She lost about 40 pounds in weight.  The workup revealed that she had markedly diminished CD4 counts of 48 and was diagnosed with CMV colitis.  Since then she has been followed in hematology clinic at [place] until recently.  She was noted to have anemia which was fairly long-standing.  She was initially referred for anemia in 2004 and also had a bone marrow biopsy done at that time which revealed hypercellular bone marrow with 70-80% cellularity and normal trilineage hematopoiesis and no morphologic features of MDS or lymphoproliferation.  Her anemia was attributed to her chronic underlying disease.  At the next evaluation in 2002 on 4 aggressive anemia there was no evidence of hemolysis, iron deficiency, B12 or folate deficiency.  Bone marrow biopsy was not pursued.  In summer of 2015 she had progressive fatigue, dyspnea on exertion and worsening anemia with a hemoglobin now of 9.  Workup at this time did suggest a hemolytic anemia with elevated LDH at 709, undetectable haptoglobin and elevated unconjugated bilirubin at 3.3.  Monospecific MARIKA was positive for both IgG and complement.  Cold agglutinin screen was positive with very low titer 1:64.  She was started on prednisone 60 mg daily with supplementation of iron folate and B12 starting 7/31/15.  Her prednisone has been slowly tapered and was discontinued off in January 2016.  She was last followed at [facility] on March 10 when she had stable hemoglobin.      SUBJECTIVE   Tricia comes alone for this clinic visit for  her hemolytic anemia.    Tricia is doing well overall. She has no new complains. She is here with labs done prior to clinic visit.       PAST MEDICAL HISTORY   1. Common variable immunodeficiency syndrome  2. 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 Selective IgA deficiency  4. Leukopenia with markedly low CD4 counts on Bactrim prophylaxis  5. History of fall with subdural hematoma in 6/11/14 with complete resolution  6. Hashimoto's disease status post partial thyroidectomy      CURRENT OUTPATIENT MEDICATIONS     Current Outpatient Prescriptions   Medication Sig     levothyroxine (SYNTHROID/LEVOTHROID) 150 MCG tablet TAKE 1 TABLET BY MOUTH ONE TIME DAILY      sulfamethoxazole-trimethoprim (BACTRIM DS/SEPTRA DS) 800-160 MG per tablet      ketoconazole (NIZORAL) 2 % shampoo      cyanocobalamin (VITAMIN  B-12) 1000 MCG tablet      folic acid (FOLVITE) 1 MG tablet      No current facility-administered medications for this visit.         ALLERGIES     Allergies   Allergen Reactions     Doxycycline         REVIEW OF SYSTEMS   As above in the HPI, o/w complete 12-point ROS was negative.     PHYSICAL EXAM   /44  Pulse 56  Temp 98.4  F (36.9  C) (Tympanic)  Resp 16  Wt 67 kg (147 lb 9.6 oz)  SpO2 97%  BMI 20.01 kg/m2    SpO2 Readings from Last 4 Encounters:   07/25/17 95%   06/27/17 97%   06/27/17 97%   05/05/17 98%     Wt Readings from Last 3 Encounters:   06/27/17 67 kg (147 lb 9.6 oz)   06/27/17 67 kg (147 lb 9.6 oz)   06/02/17 67.7 kg (149 lb 4 oz)     GEN: NAD  HEENT: PERRL, EOMI, no icterus, injection or pallor. Oropharynx is clear.  NECK: no cervical or supraclavicular lymphadenopathy  LUNGS: clear bilaterally  CV: regular, no murmurs, rubs, or gallops  ABDOMEN: soft, non-tender, non-distended, normal bowel sounds, no hepatosplenomegaly by percussion or palpation  EXT: warm, well perfused, no edema  NEURO: alert  SKIN: no rashes   LABORATORY AND IMAGING STUDIES      Recent Labs   Lab Test  06/19/17   0952  05/01/17   1022  02/06/17   1351  11/28/16   0919  11/02/16   1520   NA  143  141  142  142  140   POTASSIUM  4.0  4.0  3.9  4.3  3.9   CHLORIDE  107  107  104  106  106   CO2  28  26  29  28  28   ANIONGAP  8  8  9  8  6   BUN  9  14  14  11  18   CR  0.74  0.67  0.74  0.75  0.84   GLC  89  82  83  88  117*   CULLEN  8.6  8.6  8.4*  9.0  8.6     No results for input(s): MAG, PHOS in the last 60406 hours.  Recent Labs   Lab Test  06/19/17   0952  05/01/17   1022  04/03/17   1132  02/06/17   1351  11/28/16   0919   WBC  3.4*  4.6  4.4  5.4  5.2   HGB  12.0  10.2*  10.3*  10.1*  10.6*   PLT  157  161  148*  178  203   MCV  89  96  98  100  100   NEUTROPHIL  64.3  70.0  70.0  76.0  68.0     Recent Labs   Lab Test  06/19/17   0952  05/01/17   1022  04/03/17   1132  02/06/17   1351   BILITOTAL  0.8  1.4*   --   2.0*   ALKPHOS  95  90   --   88   ALT  35  39   --   40   AST  49*  78*   --   80*   ALBUMIN  3.9  3.9   --   4.4   LDH  394*  764*  977*  767*     TSH   Date Value Ref Range Status   06/19/2017 1.82 0.40 - 4.00 mU/L Final   04/03/2017 6.29 (H) 0.40 - 4.00 mU/L Final   03/10/2016 0.7 mcU/mL Final        ASSESSMENT AND PLAN   1. 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 Common variable immunodeficiency disorder  4. Splenomegaly    Tricia is doing well and has no new complains. I reviewed all her labs with her. They have been encouraging at this visit. For the most important one - her Hgb has improved from 10.2 at last visit to 12 this time. She continues to have no Ig A and low Ig G/Ig M. She continues to hemolyze and has undetectable haptoglobin, elevated bilirubin 0.8improved from 1.4 last time and 2 previous time, elevated LDH at 394 improved from 764 at last visit and 977 previously.      She had a fair response to Ig infusions previously and it had helped her Hgb counts  too. We would continue to do her infusions here.     I will see her in 12 weeks with labs.    Jose Spain ,  Division of Hematology, Oncology & Transplantation  [facility].

## 2017-08-07 ENCOUNTER — TRANSFERRED RECORDS (OUTPATIENT)
Dept: HEALTH INFORMATION MANAGEMENT | Facility: CLINIC | Age: 77
End: 2017-08-07

## 2017-08-22 ENCOUNTER — INFUSION THERAPY VISIT (OUTPATIENT)
Dept: INFUSION THERAPY | Facility: CLINIC | Age: 77
End: 2017-08-22
Attending: INTERNAL MEDICINE
Payer: MEDICARE

## 2017-08-22 VITALS
BODY MASS INDEX: 20.3 KG/M2 | HEART RATE: 78 BPM | TEMPERATURE: 97.6 F | DIASTOLIC BLOOD PRESSURE: 44 MMHG | SYSTOLIC BLOOD PRESSURE: 115 MMHG | RESPIRATION RATE: 16 BRPM | WEIGHT: 149.69 LBS | OXYGEN SATURATION: 96 %

## 2017-08-22 DIAGNOSIS — D83.9 CVID (COMMON VARIABLE IMMUNODEFICIENCY) (H): Primary | ICD-10-CM

## 2017-08-22 PROCEDURE — 25000128 H RX IP 250 OP 636: Performed by: INTERNAL MEDICINE

## 2017-08-22 PROCEDURE — 96366 THER/PROPH/DIAG IV INF ADDON: CPT

## 2017-08-22 PROCEDURE — 96365 THER/PROPH/DIAG IV INF INIT: CPT

## 2017-08-22 PROCEDURE — 96375 TX/PRO/DX INJ NEW DRUG ADDON: CPT

## 2017-08-22 RX ORDER — ACETAMINOPHEN 325 MG/1
650 TABLET ORAL
Status: CANCELLED
Start: 2017-08-22

## 2017-08-22 RX ORDER — DIPHENHYDRAMINE HCL 25 MG
25 CAPSULE ORAL
Status: CANCELLED | OUTPATIENT
Start: 2017-08-22

## 2017-08-22 RX ADMIN — HUMAN IMMUNOGLOBULIN G 35 G: 20 LIQUID INTRAVENOUS at 11:19

## 2017-08-22 RX ADMIN — HYDROCORTISONE SODIUM SUCCINATE 100 MG: 100 INJECTION, POWDER, FOR SOLUTION INTRAMUSCULAR; INTRAVENOUS at 11:06

## 2017-08-22 RX ADMIN — SODIUM CHLORIDE 250 ML: 9 INJECTION, SOLUTION INTRAVENOUS at 11:11

## 2017-08-22 NOTE — MR AVS SNAPSHOT
After Visit Summary   8/22/2017    Tricia Sosa    MRN: [id number]           Patient Information     Date Of Birth          1940        Visit Information        Provider Department      8/22/2017 10:30 AM  INFUSION CHAIR 7 [facility] Infusion [facility]        Today's Diagnoses     CVID (common variable immunodeficiency) (H)    -  1       Follow-ups after your visit        Your next 10 appointments already scheduled     Sep 11, 2017 10:00 AM CDT   LAB with  LAB   [facility] ([facility])    [address] [address] [phone]           Patient must bring picture ID. Patient should be prepared to give a urine specimen  Please do not eat 10-12 hours before your appointment if you are coming in fasting for labs on lipids, cholesterol, or glucose (sugar). Pregnant women should follow their Care Team instructions. Water with medications is okay. Do not drink coffee or other fluids. If you have concerns about taking  your medications, please ask at office or if scheduling via kabukut, send a message by clicking on Secure Messaging, Message Your Care Team.            Sep 19, 2017 10:30 AM CDT   Level 5 with  INFUSION CHAIR 10   [facility] Infusion Services ([facility])    [facility]  [address]  [facility] [address] [phone]            Sep 19, 2017 10:45 AM CDT   Return Visit with Jose Summers MD   [facility] ([facility])    [facility]  [address]  [facility] [address] [phone]              Who to contact     If you have questions or need follow up information about today's clinic visit or your schedule please contact [facility] INFUSION SERVICES directly at [phone].  Normal or non-critical lab and imaging results will be communicated  to you by [employer]ane Pre[employer]art, letter or phone within 4 business days after the clinic has received the results. If you do not hear from us within 7 days, please contact the clinic through [employer] or phone. If you have a critical or abnormal lab result, we will notify you by phone as soon as possible.  Submit refill requests through [employer] or call your pharmacy and they will forward the refill request to us. Please allow 3 business days for your refill to be completed.          Additional Information About Your Visit        [employer] Information     [employer] gives you secure access to your electronic health record. If you see a primary care provider, you can also send messages to your care team and make appointments. If you have questions, please call your primary care clinic.  If you do not have a primary care provider, please call [phone] and they will assist you.        Care EveryWhere ID     This is your Care EveryWhere ID. This could be used by other organizations to access your [place] medical records  [id number]        Your Vitals Were     Pulse Temperature Respirations Pulse Oximetry BMI (Body Mass Index)       78 97.6  F (36.4  C) (Tympanic) 16 96% 20.3 kg/m2        Blood Pressure from Last 3 Encounters:   08/22/17 115/44   07/25/17 114/50   06/27/17 120/44    Weight from Last 3 Encounters:   08/22/17 67.9 kg (149 lb 11.1 oz)   06/27/17 67 kg (147 lb 9.6 oz)   06/27/17 67 kg (147 lb 9.6 oz)              Today, you had the following     No orders found for display       Primary Care Provider Office Phone # Fax #    SHANIQUE Walsh Ra [facility] [phone] [address] [facility] [address]        Equal Access to Services     [facility] : Hadii carlin rios hadharmano Socarlos, waaxda luqadaha, qaybta kaalmada adeegyada, naya leon. So [facility] [phone].    ATENCIÓN: Si habla español, tiene a dial disposición servicios gratuitos de asistencia lingüística. Llame al  [phone].    We comply with applicable federal civil rights laws and [place] laws. We do not discriminate on the basis of race, color, national origin, age, disability sex, sexual orientation or gender identity.            Thank you!     Thank you for choosing [facility] INFUSION SERVICES  for your care. Our goal is always to provide you with excellent care. Hearing back from our patients is one way we can continue to improve our services. Please take a few minutes to complete the written survey that you may receive in the mail after your visit with us. Thank you!             Your Updated Medication List - Protect others around you: Learn how to safely use, store and throw away your medicines at www.disposemymeds.org.          This list is accurate as of: 8/22/17  2:10 PM.  Always use your most recent med list.                   Brand Name Dispense Instructions for use Diagnosis    cyanocobalamin 1000 MCG tablet    vitamin  B-12      Anemia, Back pain       folic acid 1 MG tablet    FOLVITE          ketoconazole 2 % shampoo    NIZORAL          levothyroxine 150 MCG tablet    SYNTHROID/LEVOTHROID    90 tablet    TAKE 1 TABLET BY MOUTH ONE TIME DAILY    Hypothyroidism, unspecified type       sulfamethoxazole-trimethoprim 800-160 MG per tablet    BACTRIM DS/SEPTRA DS

## 2017-08-22 NOTE — PROGRESS NOTES
Infusion Nursing Note:  Tricia Sosa presents today for IVIG and Solu-Cortef.    Patient seen by provider today: No   present during visit today: Not Applicable.    Note:   Infusion Rate: Started at 0.5 ml/kg/hr x 15 min, then increased by 0.5 ml/kg/hr every 15 minutes, to a max rate of 4 ml/kg/hr.    Intravenous Access:  Peripheral IV placed.    Treatment Conditions:  [facility]cre[facility] Infusion Checklist:    ~~~ NOTE: If the patient answers yes to any of the questions below, hold the infusion and contact ordering provider or on-call provider.    1. Have you recently had an elevated temperature, fever, chills, productive cough, coughing for 3 weeks or longer or hemoptysis,  abnormal vital signs, night sweats,  chest pain or have you noticed a decrease in your appetite, unexplained weight loss or fatigue? No  2. Do you have any open wounds or new incisions? No  3. Do you have any recent or upcoming hospitalizations, surgeries or dental procedures? No  4. Do you currently have or recently have had any signs of illness or infection or are you on any antibiotics? No  5. Have you had any new, sudden or worsening abdominal pain? No  6. Have you or anyone in your household received a live vaccination in the past 4 weeks? Please note:  No live vaccines while on biologic/chemotherapy until 6 months after the last treatment.  Patient can receive the flu vaccine (shot only) and the pneumovax.  It is optimal for the patient to get these vaccines mid cycle, but they can be given at any time as long as it is not on the day of the infusion. No  7. Have you recently been diagnosed with any new nervous system diseases (ie. Multiple sclerosis, Guillain [place], seizures, neurological changes) or cancer diagnosis? Are you on any form of radiation or chemotherapy? No  8. Are you pregnant or breast feeding or do you have plans of pregnancy in the future? No  9. Have you been having any signs of worsening depression or suicidal  ideations?  (benlysta only) No  10. Have there been any other new onset medical symptoms? No  .  Labs not due today. Patient will have them done with next viist.        Post Infusion Assessment:  Patient tolerated infusion without incident.  Blood return noted pre and post infusion.  Site patent and intact, free from redness, edema or discomfort.  No evidence of extravasations.  Access discontinued per protocol.    Discharge Plan:   Discharge instructions reviewed with: Patient.  Patient and/or family verbalized understanding of discharge instructions and all questions answered.  Copy of AVS reviewed with patient and/or family.  Patient will return 9/19/17 for next IVIG and provider visit with Dr. Summers.  Patient discharged in stable condition accompanied by: self.  Departure Mode: Ambulatory.    Pari Mota RN

## 2017-09-12 DIAGNOSIS — D59.19 OTHER AUTOIMMUNE HEMOLYTIC ANEMIAS: ICD-10-CM

## 2017-09-12 DIAGNOSIS — E53.8 B12 DEFICIENCY: ICD-10-CM

## 2017-09-12 DIAGNOSIS — D83.9 CVID (COMMON VARIABLE IMMUNODEFICIENCY) (H): ICD-10-CM

## 2017-09-12 LAB
ALBUMIN SERPL-MCNC: 3.8 G/DL (ref 3.4–5)
ALP SERPL-CCNC: 101 U/L (ref 40–150)
ALT SERPL W P-5'-P-CCNC: 36 U/L (ref 0–50)
ANION GAP SERPL CALCULATED.3IONS-SCNC: 10 MMOL/L (ref 3–14)
AST SERPL W P-5'-P-CCNC: 50 U/L (ref 0–45)
BILIRUB SERPL-MCNC: 0.9 MG/DL (ref 0.2–1.3)
BUN SERPL-MCNC: 15 MG/DL (ref 7–30)
CALCIUM SERPL-MCNC: 8.6 MG/DL (ref 8.5–10.1)
CHLORIDE SERPL-SCNC: 105 MMOL/L (ref 94–109)
CO2 SERPL-SCNC: 25 MMOL/L (ref 20–32)
COPATH REPORT: NORMAL
CREAT SERPL-MCNC: 0.72 MG/DL (ref 0.52–1.04)
DIFFERENTIAL METHOD BLD: ABNORMAL
EOSINOPHIL # BLD AUTO: 0.1 10E9/L (ref 0–0.7)
EOSINOPHIL NFR BLD AUTO: 3 %
ERYTHROCYTE [DISTWIDTH] IN BLOOD BY AUTOMATED COUNT: 14.3 % (ref 10–15)
GFR SERPL CREATININE-BSD FRML MDRD: 79 ML/MIN/1.7M2
GLUCOSE SERPL-MCNC: 110 MG/DL (ref 70–99)
HCT VFR BLD AUTO: 34.1 % (ref 35–47)
HGB BLD-MCNC: 11.4 G/DL (ref 11.7–15.7)
LDH SERPL L TO P-CCNC: 340 U/L (ref 81–234)
LYMPHOCYTES # BLD AUTO: 0.7 10E9/L (ref 0.8–5.3)
LYMPHOCYTES NFR BLD AUTO: 19 %
MCH RBC QN AUTO: 29.5 PG (ref 26.5–33)
MCHC RBC AUTO-ENTMCNC: 33.4 G/DL (ref 31.5–36.5)
MCV RBC AUTO: 88 FL (ref 78–100)
METAMYELOCYTES # BLD: 0.1 10E9/L
METAMYELOCYTES NFR BLD MANUAL: 3 %
MONOCYTES # BLD AUTO: 0.5 10E9/L (ref 0–1.3)
MONOCYTES NFR BLD AUTO: 13 %
NEUTROPHILS # BLD AUTO: 2.4 10E9/L (ref 1.6–8.3)
NEUTROPHILS NFR BLD AUTO: 62 %
PLATELET # BLD AUTO: 144 10E9/L (ref 150–450)
PLATELET # BLD EST: NORMAL 10*3/UL
POTASSIUM SERPL-SCNC: 4.1 MMOL/L (ref 3.4–5.3)
PROT SERPL-MCNC: 6.5 G/DL (ref 6.8–8.8)
RBC # BLD AUTO: 3.86 10E12/L (ref 3.8–5.2)
RBC MORPH BLD: ABNORMAL
RETICS # AUTO: 53.8 10E9/L (ref 25–95)
RETICS/RBC NFR AUTO: 1.4 % (ref 0.5–2)
SODIUM SERPL-SCNC: 140 MMOL/L (ref 133–144)
VIT B12 SERPL-MCNC: 636 PG/ML (ref 193–986)
WBC # BLD AUTO: 3.8 10E9/L (ref 4–11)

## 2017-09-12 PROCEDURE — 85060 BLOOD SMEAR INTERPRETATION: CPT | Performed by: PATHOLOGY

## 2017-09-12 PROCEDURE — 83615 LACTATE (LD) (LDH) ENZYME: CPT | Performed by: INTERNAL MEDICINE

## 2017-09-12 PROCEDURE — 85025 COMPLETE CBC W/AUTO DIFF WBC: CPT | Performed by: INTERNAL MEDICINE

## 2017-09-12 PROCEDURE — 85045 AUTOMATED RETICULOCYTE COUNT: CPT | Performed by: INTERNAL MEDICINE

## 2017-09-12 PROCEDURE — 82607 VITAMIN B-12: CPT | Performed by: INTERNAL MEDICINE

## 2017-09-12 PROCEDURE — 80053 COMPREHEN METABOLIC PANEL: CPT | Performed by: INTERNAL MEDICINE

## 2017-09-12 PROCEDURE — 36415 COLL VENOUS BLD VENIPUNCTURE: CPT | Performed by: INTERNAL MEDICINE

## 2017-09-12 PROCEDURE — 83010 ASSAY OF HAPTOGLOBIN QUANT: CPT | Performed by: INTERNAL MEDICINE

## 2017-09-12 PROCEDURE — 82784 ASSAY IGA/IGD/IGG/IGM EACH: CPT | Performed by: INTERNAL MEDICINE

## 2017-09-13 LAB
HAPTOGLOB SERPL-MCNC: <6 MG/DL (ref 35–175)
IGG SERPL-MCNC: 977 MG/DL (ref 695–1620)
IGM SERPL-MCNC: 76 MG/DL (ref 60–265)

## 2017-09-19 ENCOUNTER — HOSPITAL ENCOUNTER (OUTPATIENT)
Facility: CLINIC | Age: 77
Setting detail: SPECIMEN
Discharge: HOME | End: 2017-09-19
Attending: INTERNAL MEDICINE | Admitting: INTERNAL MEDICINE
Payer: MEDICARE

## 2017-09-19 ENCOUNTER — INFUSION THERAPY VISIT (OUTPATIENT)
Dept: INFUSION THERAPY | Facility: CLINIC | Age: 77
End: 2017-09-19
Attending: INTERNAL MEDICINE
Payer: MEDICARE

## 2017-09-19 ENCOUNTER — ONCOLOGY VISIT (OUTPATIENT)
Dept: ONCOLOGY | Facility: CLINIC | Age: 77
End: 2017-09-19
Attending: INTERNAL MEDICINE
Payer: MEDICARE

## 2017-09-19 VITALS
RESPIRATION RATE: 16 BRPM | HEIGHT: 72 IN | TEMPERATURE: 97.5 F | HEART RATE: 78 BPM | SYSTOLIC BLOOD PRESSURE: 120 MMHG | BODY MASS INDEX: 20.07 KG/M2 | DIASTOLIC BLOOD PRESSURE: 61 MMHG | WEIGHT: 148.2 LBS

## 2017-09-19 DIAGNOSIS — E53.8 B12 DEFICIENCY: ICD-10-CM

## 2017-09-19 DIAGNOSIS — D83.9 CVID (COMMON VARIABLE IMMUNODEFICIENCY) (H): Primary | ICD-10-CM

## 2017-09-19 DIAGNOSIS — D59.19 OTHER AUTOIMMUNE HEMOLYTIC ANEMIAS: ICD-10-CM

## 2017-09-19 PROCEDURE — 96366 THER/PROPH/DIAG IV INF ADDON: CPT

## 2017-09-19 PROCEDURE — 96375 TX/PRO/DX INJ NEW DRUG ADDON: CPT

## 2017-09-19 PROCEDURE — 99211 OFF/OP EST MAY X REQ PHY/QHP: CPT | Mod: 25

## 2017-09-19 PROCEDURE — 25000128 H RX IP 250 OP 636: Performed by: INTERNAL MEDICINE

## 2017-09-19 PROCEDURE — 86357 NK CELLS TOTAL COUNT: CPT | Performed by: INTERNAL MEDICINE

## 2017-09-19 PROCEDURE — 96365 THER/PROPH/DIAG IV INF INIT: CPT

## 2017-09-19 PROCEDURE — 86355 B CELLS TOTAL COUNT: CPT | Performed by: INTERNAL MEDICINE

## 2017-09-19 PROCEDURE — 86360 T CELL ABSOLUTE COUNT/RATIO: CPT | Performed by: INTERNAL MEDICINE

## 2017-09-19 PROCEDURE — 99213 OFFICE O/P EST LOW 20 MIN: CPT | Performed by: INTERNAL MEDICINE

## 2017-09-19 PROCEDURE — 86359 T CELLS TOTAL COUNT: CPT | Performed by: INTERNAL MEDICINE

## 2017-09-19 RX ORDER — DIPHENHYDRAMINE HCL 25 MG
25 CAPSULE ORAL
Status: CANCELLED | OUTPATIENT
Start: 2017-09-19

## 2017-09-19 RX ORDER — ACETAMINOPHEN 325 MG/1
650 TABLET ORAL
Status: CANCELLED
Start: 2017-09-19

## 2017-09-19 RX ADMIN — HUMAN IMMUNOGLOBULIN G 35 G: 20 LIQUID INTRAVENOUS at 11:48

## 2017-09-19 RX ADMIN — HYDROCORTISONE SODIUM SUCCINATE 100 MG: 100 INJECTION, POWDER, FOR SOLUTION INTRAMUSCULAR; INTRAVENOUS at 11:46

## 2017-09-19 ASSESSMENT — PAIN SCALES - GENERAL: PAINLEVEL: NO PAIN (0)

## 2017-09-19 NOTE — PATIENT INSTRUCTIONS
Follow up in 12 weeks with labs prior to visit to see me. Scheduled for Dec 12th @ 11:15/Zelda      CBC w Diff, CMP and LDH; B12, retic, ferritin, iron panel, Ig subsets, haptoglobin- Pt will have her labs drawn 1 week prior to MD visit in Dec.  Zelda    Continue Ig infusions as current - Scheduled out to December 12th.  Zelda    AVS printed for Alfonzo Ndiaye

## 2017-09-19 NOTE — PROGRESS NOTES
[facility] CANCER CLINIC  FOLLOW-UP VISIT NOTE    PATIENT NAME: Tricia Sosa MRN # [id number]  DATE OF VISIT: Sep 19, 2017 YOB: 1940    REFERRING PROVIDER: No referring provider defined for this encounter.    DIAGNOSIS: Hemolytic anemia-autoimmune; IgA deficiency    TREATMENT SUMMARY:  Tricia has been diagnosed with IgA deficiency since her around 2000.  She was very sick at the time and had severe diarrhea.  She lost about 40 pounds in weight.  The workup revealed that she had markedly diminished CD4 counts of 48 and was diagnosed with CMV colitis.  Since then she has been followed in hematology clinic at [place] until recently.  She was noted to have anemia which was fairly long-standing.  She was initially referred for anemia in 2004 and also had a bone marrow biopsy done at that time which revealed hypercellular bone marrow with 70-80% cellularity and normal trilineage hematopoiesis and no morphologic features of MDS or lymphoproliferation.  Her anemia was attributed to her chronic underlying disease.  At the next evaluation in 2002 on 4 aggressive anemia there was no evidence of hemolysis, iron deficiency, B12 or folate deficiency.  Bone marrow biopsy was not pursued.  In summer of 2015 she had progressive fatigue, dyspnea on exertion and worsening anemia with a hemoglobin now of 9.  Workup at this time did suggest a hemolytic anemia with elevated LDH at 709, undetectable haptoglobin and elevated unconjugated bilirubin at 3.3.  Monospecific MARIKA was positive for both IgG and complement.  Cold agglutinin screen was positive with very low titer 1:64.  She was started on prednisone 60 mg daily with supplementation of iron folate and B12 starting 7/31/15.  Her prednisone has been slowly tapered and was discontinued off in January 2016.  She was last followed at [facility] on March 10 when she had stable hemoglobin.      SUBJECTIVE   Tricia comes alone for this clinic visit for  "her hemolytic anemia.    Tricia is doing well overall. She has no new complains. She is here with labs done prior to clinic visit.       PAST MEDICAL HISTORY   1. Common variable immunodeficiency syndrome  2. 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 Selective IgA deficiency  4. Leukopenia with markedly low CD4 counts on Bactrim prophylaxis  5. History of fall with subdural hematoma in 6/11/14 with complete resolution  6. Hashimoto's disease status post partial thyroidectomy      CURRENT OUTPATIENT MEDICATIONS     Current Outpatient Prescriptions   Medication Sig     levothyroxine (SYNTHROID/LEVOTHROID) 150 MCG tablet TAKE 1 TABLET BY MOUTH ONE TIME DAILY      sulfamethoxazole-trimethoprim (BACTRIM DS/SEPTRA DS) 800-160 MG per tablet      ketoconazole (NIZORAL) 2 % shampoo      cyanocobalamin (VITAMIN  B-12) 1000 MCG tablet      folic acid (FOLVITE) 1 MG tablet      No current facility-administered medications for this visit.      Facility-Administered Medications Ordered in Other Visits   Medication     hydrocortisone sodium succinate PF (solu-CORTEF) injection 100 mg     immune globulin (Privigen)- sucrose free 10 % injection 35 g     INFUSION HYPERSENSITIVITY        ALLERGIES     Allergies   Allergen Reactions     Doxycycline         REVIEW OF SYSTEMS   As above in the HPI, o/w complete 12-point ROS was negative.     PHYSICAL EXAM   /61 (Patient Position: Chair)  Pulse 78  Temp 97.5  F (36.4  C) (Tympanic)  Resp 16  Ht 1.829 m (6' 0.01\")  Wt 67.2 kg (148 lb 3.2 oz)  BMI 20.09 kg/m2    SpO2 Readings from Last 4 Encounters:   08/22/17 96%   07/25/17 95%   06/27/17 97%   06/27/17 97%     Wt Readings from Last 3 Encounters:   09/19/17 67.2 kg (148 lb 3.2 oz)   08/22/17 67.9 kg (149 lb 11.1 oz)   06/27/17 67 kg (147 lb 9.6 oz)     GEN: NAD  HEENT: PERRL, EOMI, no icterus, injection or pallor. Oropharynx is clear.  NECK: no cervical or supraclavicular " lymphadenopathy  LUNGS: clear bilaterally  CV: regular, no murmurs, rubs, or gallops  ABDOMEN: soft, non-tender, non-distended, normal bowel sounds, no hepatosplenomegaly by percussion or palpation  EXT: warm, well perfused, no edema  NEURO: alert  SKIN: no rashes     LABORATORY AND IMAGING STUDIES     Recent Labs   Lab Test  09/12/17   0828  06/19/17   0952  05/01/17   1022  02/06/17   1351  11/28/16   0919   NA  140  143  141  142  142   POTASSIUM  4.1  4.0  4.0  3.9  4.3   CHLORIDE  105  107  107  104  106   CO2  25  28  26  29  28   ANIONGAP  10  8  8  9  8   BUN  15  9  14  14  11   CR  0.72  0.74  0.67  0.74  0.75   GLC  110*  89  82  83  88   CULLEN  8.6  8.6  8.6  8.4*  9.0     Recent Labs   Lab Test  09/12/17   0828  06/19/17   0952  05/01/17   1022  04/03/17   1132  02/06/17   1351   WBC  3.8*  3.4*  4.6  4.4  5.4   HGB  11.4*  12.0  10.2*  10.3*  10.1*   PLT  144*  157  161  148*  178   MCV  88  89  96  98  100   NEUTROPHIL  62.0  64.3  70.0  70.0  76.0     Recent Labs   Lab Test  09/12/17 0828 06/19/17   0952  05/01/17   1022   BILITOTAL  0.9  0.8  1.4*   ALKPHOS  101  95  90   ALT  36  35  39   AST  50*  49*  78*   ALBUMIN  3.8  3.9  3.9   LDH  340*  394*  764*     TSH   Date Value Ref Range Status   06/19/2017 1.82 0.40 - 4.00 mU/L Final   04/03/2017 6.29 (H) 0.40 - 4.00 mU/L Final   03/10/2016 0.7 mcU/mL Final          ASSESSMENT AND PLAN   1. 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 Common variable immunodeficiency disorder  4. Splenomegaly    Tricia is doing well and has no new complains. I reviewed all her labs with her. They have been stable at this visit. For the most important one - her Hgb has remained stable at 11.4 g/dl this time. She continues to have no Ig A and low Ig G/Ig M. She continues to hemolyze and has undetectable haptoglobin, elevated bilirubin 0.9 improved from 1.4 and 2 in recent  past, elevated LDH at 340 improved from 394, 764 and 977 previously.      She had a fair response to Ig infusions previously and it had helped her Hgb counts too. We would continue to do her infusions here.     I will see her in 12 weeks with labs.        Jose Spain ,  Division of Hematology, Oncology & Transplantation  [facility].

## 2017-09-19 NOTE — MR AVS SNAPSHOT
After Visit Summary   9/19/2017    Tricia Sosa    MRN: [id number]           Patient Information     Date Of Birth          1940        Visit Information        Provider Department      9/19/2017 10:45 AM Jose Summers MD [facility]        Today's Diagnoses     CVID (common variable immunodeficiency) (H)    -  1    Other autoimmune hemolytic anemias (H)        B12 deficiency          Care Instructions    Follow up in 12 weeks with labs prior to visit to see me. Scheduled for Dec 12th @ 11:15/Zelda      CBC w Diff, CMP and LDH; B12, retic, ferritin, iron panel, Ig subsets, haptoglobin- Pt will have her labs drawn 1 week prior to MD visit in Dec.  Zelda    Continue Ig infusions as current - Scheduled out to December 12th.  Zelda    AVS printed for TriciaKeon  Zelda            Follow-ups after your visit        Your next 10 appointments already scheduled     Oct 16, 2017 11:30 AM CDT   Level 5 with RH INFUSION CHAIR 8   [facility] Infusion [facility] ([facility])    [facility]  [address]  [facility] [address]   [phone]            Nov 14, 2017 11:30 AM CST   Level 4 with RH INFUSION CHAIR 9   [facility] Infusion [facility] ([facility])    [facility]  [address]  [facility] [address]   [phone]            Dec 12, 2017 11:00 AM CST   Level 4 with RH INFUSION CHAIR 12   [facility]fusion [facility] ([facility])    [facility]  [address]  [facility] [address]   [phone]            Dec 12, 2017 11:15 AM CST   Return Visit with Jose Summers MD   [facility] ([facility])    [facility]  [address]  [facility] [address]   [phone]              Who to contact     If you have  "questions or need follow up information about today's clinic visit or your schedule please contact [facility] CANCER CARE directly at [phone].  Normal or non-critical lab and imaging results will be communicated to you by MyChart, letter or phone within 4 business days after the clinic has received the results. If you do not hear from us within 7 days, please contact the clinic through [employer] or phone. If you have a critical or abnormal lab result, we will notify you by phone as soon as possible.  Submit refill requests through [employer] or call your pharmacy and they will forward the refill request to us. Please allow 3 business days for your refill to be completed.          Additional Information About Your Visit        [employer] Information     [employer] gives you secure access to your electronic health record. If you see a primary care provider, you can also send messages to your care team and make appointments. If you have questions, please call your primary care clinic.  If you do not have a primary care provider, please call [phone] and they will assist you.        Care EveryWhere ID     This is your Care EveryWhere ID. This could be used by other organizations to access your [place] medical records  [id number]        Your Vitals Were     Pulse Temperature Respirations Height BMI (Body Mass Index)       78 97.5  F (36.4  C) (Tympanic) 16 1.829 m (6' 0.01\") 20.09 kg/m2        Blood Pressure from Last 3 Encounters:   09/19/17 120/61   08/22/17 115/44   07/25/17 114/50    Weight from Last 3 Encounters:   09/19/17 67.2 kg (148 lb 3.2 oz)   08/22/17 67.9 kg (149 lb 11.1 oz)   06/27/17 67 kg (147 lb 9.6 oz)              We Performed the Following     T cell subset extended profile        Primary Care Provider Office Phone # Fax #    SHANIQUE [facility] [phone]       [address]  [facility] [address]        Equal Access to Services     ZIA HENRIQUEZ AH: Hadii aad ku " bette Serna, andry johnsonleisaha, andrewta kabarbara keeshamo, naya selamin hayaavipin thaomert lenojennbillie ackerman carolyn. So [facility] [phone].    ATENCIÓN: Si habla español, tiene a dial disposición servicios gratuitos de asistencia lingüística. Carlos Eduardo al [phone].    We comply with applicable federal civil rights laws and [place] laws. We do not discriminate on the basis of race, color, national origin, age, disability sex, sexual orientation or gender identity.            Thank you!     Thank you for choosing [facility] CANCER CARE  for your care. Our goal is always to provide you with excellent care. Hearing back from our patients is one way we can continue to improve our services. Please take a few minutes to complete the written survey that you may receive in the mail after your visit with us. Thank you!             Your Updated Medication List - Protect others around you: Learn how to safely use, store and throw away your medicines at www.disposemymeds.org.          This list is accurate as of: 9/19/17  5:26 PM.  Always use your most recent med list.                   Brand Name Dispense Instructions for use Diagnosis    cyanocobalamin 1000 MCG tablet    vitamin  B-12      Anemia, Back pain       folic acid 1 MG tablet    FOLVITE          ketoconazole 2 % shampoo    NIZORAL          levothyroxine 150 MCG tablet    SYNTHROID/LEVOTHROID    90 tablet    TAKE 1 TABLET BY MOUTH ONE TIME DAILY    Hypothyroidism, unspecified type       sulfamethoxazole-trimethoprim 800-160 MG per tablet    BACTRIM DS/SEPTRA DS

## 2017-09-19 NOTE — NURSING NOTE
"Oncology Rooming Note    September 19, 2017 11:00 AM   Tricia Sosa is a 76 year old female who presents for:    Chief Complaint   Patient presents with     Oncology Clinic Visit     Follow up     Initial Vitals: Resp 16  Ht 1.829 m (6' 0.01\")  Wt 67.2 kg (148 lb 3.2 oz)  BMI 20.09 kg/m2 Estimated body mass index is 20.09 kg/(m^2) as calculated from the following:    Height as of this encounter: 1.829 m (6' 0.01\").    Weight as of this encounter: 67.2 kg (148 lb 3.2 oz). Body surface area is 1.85 meters squared.  No Pain (0) Comment: Data Unavailable   No LMP recorded. Patient is postmenopausal.  Allergies reviewed: Yes  Medications reviewed: Yes    Medications: Medication refills not needed today.  Pharmacy name entered into [employer]:    [id number] - [place], [place] - [address] [facility] [address] [facility]    Clinical concerns: Follow-up    8 minutes for nursing intake (face to face time)   DISCHARGE PLAN:  Next appointments: See patient instruction section  Departure Mode: Ambulatory  Accompanied by: self  0 minutes for nursing discharge (face to face time)   Roya Hustno                  "

## 2017-09-19 NOTE — MR AVS SNAPSHOT
After Visit Summary   9/19/2017    Tricia Sosa    MRN: [id number]           Patient Information     Date Of Birth          1940        Visit Information        Provider Department      9/19/2017 10:30 AM RH INFUSION CHAIR 10 [facility]fusion [facility]        Today's Diagnoses     CVID (common variable immunodeficiency) (H)    -  1       Follow-ups after your visit        Your next 10 appointments already scheduled     Oct 16, 2017 11:30 AM CDT   Level 5 with RH INFUSION CHAIR 8   [facility] ([facility])    [facility]  [address]  [facility] [address]   [phone]            Nov 14, 2017 11:30 AM CST   Level 4 with RH INFUSION CHAIR 9   [facility] ([facility])    [facility]  [address]  [facility] [address]   [phone]            Dec 12, 2017 11:00 AM CST   Level 4 with RH INFUSION CHAIR 12   [facility] ([facility])    [facility]  [address]  [facility] [address]   [phone]            Dec 12, 2017 11:15 AM CST   Return Visit with Jose Summers MD   [facility] ([facility])    [facility]  [address]  [facility] [address] [phone]              Who to contact     If you have questions or need follow up information about today's clinic visit or your schedule please contact [facility] INFUSION SERVICES directly at [phone].  Normal or non-critical lab and imaging results will be communicated to you by MyChart, letter or phone within 4 business days after the clinic has received the results. If you do not hear from us within 7 days, please contact the clinic through MyChart or phone. If you have a  critical or abnormal lab result, we will notify you by phone as soon as possible.  Submit refill requests through [employer] or call your pharmacy and they will forward the refill request to us. Please allow 3 business days for your refill to be completed.          Additional Information About Your Visit        [employer] Information     [employer] gives you secure access to your electronic health record. If you see a primary care provider, you can also send messages to your care team and make appointments. If you have questions, please call your primary care clinic.  If you do not have a primary care provider, please call [phone] and they will assist you.        Care EveryWhere ID     This is your Care EveryWhere ID. This could be used by other organizations to access your [place] medical records  [id number]         Blood Pressure from Last 3 Encounters:   09/19/17 120/61   08/22/17 115/44   07/25/17 114/50    Weight from Last 3 Encounters:   09/19/17 67.2 kg (148 lb 3.2 oz)   08/22/17 67.9 kg (149 lb 11.1 oz)   06/27/17 67 kg (147 lb 9.6 oz)              Today, you had the following     No orders found for display       Primary Care Provider Office Phone # Fax #    Monika SHANIQUE [facility] [phone] [address] [facility] [address]        Equal Access to Services     ZIA HENRIQUEZ : Hadii aad ku hadasho Soomaali, waaxda luqadaha, qaybta kaalmada adeegyada, naya leon. So [facility] [phone].    ATENCIÓN: Si habla español, tiene a dial disposición servicios gratuitos de asistencia lingüística. Llame al [phone].    We comply with applicable federal civil rights laws and [place] laws. We do not discriminate on the basis of race, color, national origin, age, disability sex, sexual orientation or gender identity.            Thank you!     Thank you for choosing [facility] INFUSION SERVICES  for your care. Our goal is always to provide you  with excellent care. Hearing back from our patients is one way we can continue to improve our services. Please take a few minutes to complete the written survey that you may receive in the mail after your visit with us. Thank you!             Your Updated Medication List - Protect others around you: Learn how to safely use, store and throw away your medicines at www.disposemymeds.org.          This list is accurate as of: 9/19/17  2:31 PM.  Always use your most recent med list.                   Brand Name Dispense Instructions for use Diagnosis    cyanocobalamin 1000 MCG tablet    vitamin  B-12      Anemia, Back pain       folic acid 1 MG tablet    FOLVITE          ketoconazole 2 % shampoo    NIZORAL          levothyroxine 150 MCG tablet    SYNTHROID/LEVOTHROID    90 tablet    TAKE 1 TABLET BY MOUTH ONE TIME DAILY    Hypothyroidism, unspecified type       sulfamethoxazole-trimethoprim 800-160 MG per tablet    BACTRIM DS/SEPTRA DS

## 2017-09-19 NOTE — PROGRESS NOTES
"Infusion Nursing Note:  Tricia Sosa presents today for IVIG, Solu-Cortef and labs.    Patient seen by provider today: Yes: Dr. Summers.   present during visit today: Not Applicable.    Note: Infusion rate: started at 0.5 ml/kg/hr x 15 min, then increased by 0.5 ml/kg/hr every 15 min, to a max rate of 4 ml/kg/hr.  Patient requesting to have her orders changed to increase her rate by 1 ml/kg/hr every 15 min; patient states she has gone a long time without any issues.  Per Dr. Summers ok to make this change; orders changed to reflect this for future visits.    Intravenous Access:  Peripheral IV placed.    Treatment Conditions:  Rheumatology Infusion Checklist: PRIOR TO INFUSION OF BIOLOGICAL MEDICATIONS OR ANY OF THESE AS LISTED: Immune Globulin (IVIG) \".rheumbiologicalchecklist\"    Prior to Infusion of biological medications or any of these as listed:    1. Elevated temperature, fever, chills, productive cough or abnormal vital signs, night sweats, coughing up blood or sputum, no appetite or abnormal vital signs : NO    2. Open wounds or new incisions: NO    3. Recent hospitalization: NO    4.  Recent surgeries:  NO    5. Any upcoming surgeries or dental procedures?:NO    6. Any current or recent bouts of illness or infection? On any antibiotics? : NO    7. Any new, sudden or worsening abdominal pain :NO    8. Vaccination within 4 weeks? Patient or someone in the household is scheduled to receive vaccination? No live virus vaccines prior to or during treatment :NO    9. Any nervous system diseases [i.e. multiple sclerosis, Guillain-Steens, seizures, neurological  changes]: NO    10. Pregnant or breast feeding; or plans on pregnancy in the future: NO    11. Signs of worsening depression or suicidal ideations while taking benlysta:NO    12. New-onset medical symptoms: NO    13.  New cancer diagnosis or on chemotherapy or radiation NO    14.  Evaluate for any sign of active TB [Unexplained weight loss, Loss of " appetite, Night sweats, Fever, Fatigue, Chills, Coughing for 3 weeks or longer, Hemoptysis (coughing up blood), Chest pain]: NO    **Note: If answered yes to any of the above, hold the infusion and contact ordering rheumatologist or on-call rheumatologist.   .        Post Infusion Assessment:  Patient tolerated infusion without incident.  Blood return noted pre and post infusion.  Site patent and intact, free from redness, edema or discomfort.  No evidence of extravasations.  Access discontinued per protocol.    Discharge Plan:   Discharge instructions reviewed with: Patient.  Patient and/or family verbalized understanding of discharge instructions and all questions answered.  Copy of AVS reviewed with patient and/or family.  Patient will return 10/16/17 for next appointment.  Patient discharged in stable condition accompanied by: self.  Departure Mode: Ambulatory.    Pari Mota RN

## 2017-09-20 LAB
CD19 CELLS # BLD: 8 CELLS/UL (ref 107–698)
CD19 CELLS NFR BLD: 1 % (ref 6–27)
CD3 CELLS # BLD: 636 CELLS/UL (ref 603–2990)
CD3 CELLS NFR BLD: 86 % (ref 49–84)
CD3+CD4+ CELLS # BLD: 232 CELLS/UL (ref 441–2156)
CD3+CD4+ CELLS NFR BLD: 31 % (ref 28–63)
CD3+CD4+ CELLS/CD3+CD8+ CLL BLD: 0.63 % (ref 1.4–2.6)
CD3+CD8+ CELLS # BLD: 365 CELLS/UL (ref 125–1312)
CD3+CD8+ CELLS NFR BLD: 49 % (ref 10–40)
CD3-CD16+CD56+ CELLS # BLD: 96 CELLS/UL (ref 95–640)
CD3-CD16+CD56+ CELLS NFR BLD: 13 % (ref 4–25)
IFC SPECIMEN: ABNORMAL

## 2017-09-21 DIAGNOSIS — D83.9 CVID (COMMON VARIABLE IMMUNODEFICIENCY) (H): ICD-10-CM

## 2017-09-21 DIAGNOSIS — D64.9 ANEMIA: ICD-10-CM

## 2017-09-21 DIAGNOSIS — D59.19 OTHER AUTOIMMUNE HEMOLYTIC ANEMIAS: Primary | ICD-10-CM

## 2017-10-09 ENCOUNTER — OFFICE VISIT (OUTPATIENT)
Dept: FAMILY MEDICINE | Facility: CLINIC | Age: 77
End: 2017-10-09
Payer: COMMERCIAL

## 2017-10-09 VITALS
SYSTOLIC BLOOD PRESSURE: 110 MMHG | DIASTOLIC BLOOD PRESSURE: 60 MMHG | HEART RATE: 74 BPM | BODY MASS INDEX: 20.46 KG/M2 | OXYGEN SATURATION: 96 % | TEMPERATURE: 97.9 F | WEIGHT: 150.9 LBS

## 2017-10-09 DIAGNOSIS — Z23 NEED FOR PROPHYLACTIC VACCINATION AND INOCULATION AGAINST INFLUENZA: ICD-10-CM

## 2017-10-09 DIAGNOSIS — Z00.00 MEDICARE ANNUAL WELLNESS VISIT, SUBSEQUENT: Primary | ICD-10-CM

## 2017-10-09 DIAGNOSIS — M79.604 PAIN OF RIGHT LOWER EXTREMITY: ICD-10-CM

## 2017-10-09 PROCEDURE — 99397 PER PM REEVAL EST PAT 65+ YR: CPT | Mod: 25 | Performed by: NURSE PRACTITIONER

## 2017-10-09 PROCEDURE — 90662 IIV NO PRSV INCREASED AG IM: CPT | Performed by: NURSE PRACTITIONER

## 2017-10-09 PROCEDURE — G0008 ADMIN INFLUENZA VIRUS VAC: HCPCS | Performed by: NURSE PRACTITIONER

## 2017-10-09 RX ORDER — FLUOCINOLONE ACETONIDE 0.11 MG/ML
OIL TOPICAL
COMMUNITY
Start: 2017-10-09 | End: 2019-08-12 | Stop reason: ALTCHOICE

## 2017-10-09 RX ORDER — FLUOCINOLONE ACETONIDE 0.1 MG/ML
SOLUTION TOPICAL 2 TIMES DAILY
COMMUNITY
Start: 2017-10-09 | End: 2017-10-20

## 2017-10-09 NOTE — PROGRESS NOTES
SUBJECTIVE:   Tricia Sosa is a 76 year old female who presents for Preventive Visit.    Are you in the first 12 months of your Medicare coverage?  No    Physical   Annual:     Getting at least 3 servings of Calcium per day::  Yes    Bi-annual eye exam::  Yes    Dental care twice a year::  NO    Sleep apnea or symptoms of sleep apnea::  Daytime drowsiness    Diet::  Regular (no restrictions)    Frequency of exercise::  6-7 days/week    Duration of exercise::  Greater than 60 minutes    Taking medications regularly::  Yes    Medication side effects::  None    Additional concerns today::  YES      COGNITIVE SCREEN  1) Repeat 3 items (Banana, Sunrise, Chair)    2) Clock draw: NORMAL  3) 3 item recall: Recalls 3 objects  Results: 3 items recalled: COGNITIVE IMPAIRMENT LESS LIKELY    Mini-CogTM Copyright S Otf. Licensed by the author for use in [facility]; reprinted with permission (roxi@[facility]). All rights reserved.          Reviewed and updated as needed this visit by clinical staffTobacco  Allergies  Meds  Med Hx  Surg Hx  Fam Hx  Soc Hx        Reviewed and updated as needed this visit by Provider        Social History   Substance Use Topics     Smoking status: Never Smoker     Smokeless tobacco: Never Used     Alcohol use No       The patient does not drink >3 drinks per day nor >7 drinks per week.          Hypothyroidism  Stable, compliant with medication.    CVID  Continues to receive infusions, tolerating well.  Dr. Summers is continuing to manage this for her so she does not need to be seen regular by immunology.    Hx of hemolytic anemia.  Labs have been stable.  Weight and energy stable.    Today's PHQ-2 Score: PHQ-2 ( 1999 Pfizer) 10/9/2017   Q1: Little interest or pleasure in doing things 0   Q2: Feeling down, depressed or hopeless 1   PHQ-2 Score 1   Q1: Little interest or pleasure in doing things Not at all   Q2: Feeling down, depressed or hopeless Several days   PHQ-2 Score 1       Do  you feel safe in your environment - Yes    Do you have a Health Care Directive?: Yes: Advance Directive has been received and scanned.    Current providers sharing in care for this patient include:   Patient Care Team:  Monika Whipple Ra, SHANIQUE RAYA as PCP - General (Family Practice)      Hearing impairment: Yes, Need to ask people to speak up or repeat themselves.    Ability to successfully perform activities of daily living: Yes, no assistance needed     Fall risk:         Home safety:  none identified  click delete button to remove this line now    The following health maintenance items are reviewed in Epic and correct as of today:Health Maintenance   Topic Date Due     ADVANCE DIRECTIVE PLANNING Q5 YRS  10/25/1995     PNEUMOCOCCAL (2 of 2 - PCV13) 09/12/2015     FALL RISK ASSESSMENT  04/05/2017     INFLUENZA VACCINE (SYSTEM ASSIGNED)  09/01/2017     TSH Q1 YEAR  06/19/2018     LIPID SCREEN Q5 YR FEMALE (SYSTEM ASSIGNED)  06/11/2019     TETANUS IMMUNIZATION (SYSTEM ASSIGNED)  04/21/2021     COLONOSCOPY Q5 YR  07/19/2021     DEXA SCAN SCREENING (SYSTEM ASSIGNED)  Completed     Patient Active Problem List   Diagnosis     Traction detachment of retina     Hypothyroidism     CVID (common variable immunodeficiency) (H)     Hemolytic anemia (H)     B12 deficiency     CARDIOVASCULAR SCREENING; LDL GOAL LESS THAN 160     Cervicalgia     Other autoimmune hemolytic anemias (H)     Past Surgical History:   Procedure Laterality Date     C LIGATE FALLOPIAN TUBE       C THYROIDECTOMY       COLONOSCOPY N/A 4/26/2016    Procedure: COLONOSCOPY;  Surgeon: Dontae Del Rio MD;  Location:  GI      COLONOSCOPY W BIOPSY  4/26/00     HC COLONOSCOPY W BIOPSY  10/8/03     HC COLONOSCOPY W BIOPSY  6/27/05    REPEAT IN 5 YEARS      CYSTOSCOPY,INSERT URETERAL STENT      Ureteral stent insertion     HC FLEX SIGMOIDOSCOPY W BIOPSY  5/30/00      LAPAROSCOPY, SURGICAL; CHOLECYSTECTOMY  1992     LIGATION OF HEMORRHOID(S)       "Hemorrhoidectomy     UPPER GI ENDOSCOPY,BIOPSY  10/01/01       Social History   Substance Use Topics     Smoking status: Never Smoker     Smokeless tobacco: Never Used     Alcohol use No     Family History   Problem Relation Age of Onset     CANCER Mother       of colon cancer at age 90     CEREBROVASCULAR DISEASE Father       at age 85     Colon Cancer No family hx of              No response to pneumonia vaccine in the past.    ROS:  Constitutional, HEENT, cardiovascular, pulmonary, gi and gu systems are negative, except as otherwise noted.      OBJECTIVE:   /60  Pulse 74  Temp 97.9  F (36.6  C) (Oral)  Wt 150 lb 14.4 oz (68.4 kg)  SpO2 96%  BMI 20.46 kg/m2 Estimated body mass index is 20.09 kg/(m^2) as calculated from the following:    Height as of 17: 6' 0.01\" (1.829 m).    Weight as of 17: 148 lb 3.2 oz (67.2 kg).  EXAM:   GENERAL: healthy, alert and no distress  EYES: Eyes grossly normal to inspection, PERRL and conjunctivae and sclerae normal  HENT: ear canals and TM's normal, nose and mouth without ulcers or lesions  NECK: no adenopathy, no asymmetry, masses, or scars and thyroid normal to palpation  RESP: lungs clear to auscultation - no rales, rhonchi or wheezes  CV: regular rate and rhythm, normal S1 S2, no S3 or S4, no murmur, click or rub, no peripheral edema and peripheral pulses strong  ABDOMEN: soft, nontender, no hepatosplenomegaly, no masses and bowel sounds normal  SKIN: no suspicious lesions or rashes  NEURO: Normal strength and tone, mentation intact and speech normal  PSYCH: mentation appears normal, affect normal/bright    ASSESSMENT / PLAN:   1. Medicare annual wellness visit, subsequent  Chronic conditions stable.      2. Pain of right lower extremity    3. Need for prophylactic vaccination and inoculation against influenza  - FLU VACCINE, INCREASED ANTIGEN, PRESV FREE, AGE 65+ [58263]  - ADMIN INFLUENZA (For MEDICARE Patients ONLY) []    End of Life " "Planning:  Patient currently has an advanced directive: Pt has at home, will bring in to clinic.    COUNSELING:  Reviewed preventive health counseling, as reflected in patient instructions        Estimated body mass index is 20.09 kg/(m^2) as calculated from the following:    Height as of 9/19/17: 6' 0.01\" (1.829 m).    Weight as of 9/19/17: 148 lb 3.2 oz (67.2 kg).     reports that she has never smoked. She has never used smokeless tobacco.        Appropriate preventive services were discussed with this patient, including applicable screening as appropriate for cardiovascular disease, diabetes, osteopenia/osteoporosis, and glaucoma.  As appropriate for age/gender, discussed screening for colorectal cancer, prostate cancer, breast cancer, and cervical cancer. Checklist reviewing preventive services available has been given to the patient.    Reviewed patients plan of care and provided an AVS. The Intermediate Care Plan ( asthma action plan, low back pain action plan, and migraine action plan) for Tricia meets the Care Plan requirement. This Care Plan has been established and reviewed with the Patient.    Counseling Resources:  ATP IV Guidelines  Pooled Cohorts Equation Calculator  Breast Cancer Risk Calculator  FRAX Risk Assessment  ICSI Preventive Guidelines  Dietary Guidelines for Americans, 2010  USDA's MyPlate  ASA Prophylaxis  Lung CA Screening    SHANIQUE Walsh Ra, CNP  [facility]  "

## 2017-10-09 NOTE — MR AVS SNAPSHOT
After Visit Summary   10/9/2017    Tricia Sosa    MRN: [id number]           Patient Information     Date Of Birth          1940        Visit Information        Provider Department      10/9/2017 10:00 AM Monika Whipple Ra, APRN [facility]        Care Instructions      Preventive Health Recommendations    Female Ages 65 +    Yearly exam:     See your health care provider every year in order to  o Review health changes.   o Discuss preventive care.    o Review your medicines if your doctor has prescribed any.      You no longer need a yearly Pap test unless you've had an abnormal Pap test in the past 10 years. If you have vaginal symptoms, such as bleeding or discharge, be sure to talk with your provider about a Pap test.      Every 1 to 2 years, have a mammogram.  If you are over 69, talk with your health care provider about whether or not you want to continue having screening mammograms.      Every 10 years, have a colonoscopy. Or, have a yearly FIT test (stool test). These exams will check for colon cancer.       Have a cholesterol test every 5 years, or more often if your doctor advises it.       Have a diabetes test (fasting glucose) every three years. If you are at risk for diabetes, you should have this test more often.       At age 65, have a bone density scan (DEXA) to check for osteoporosis (brittle bone disease).    Shots:    Get a flu shot each year.    Get a tetanus shot every 10 years.    Talk to your doctor about your pneumonia vaccines. There are now two you should receive - Pneumovax (PPSV 23) and Prevnar (PCV 13).    Talk to your doctor about the shingles vaccine.    Talk to your doctor about the hepatitis B vaccine.    Nutrition:     Eat at least 5 servings of fruits and vegetables each day.      Eat whole-grain bread, whole-wheat pasta and brown rice instead of white grains and rice.      Talk to your provider about Calcium and Vitamin D.      Lifestyle    Exercise at least 150 minutes a week (30 minutes a day, 5 days a week). This will help you control your weight and prevent disease.      Limit alcohol to one drink per day.      No smoking.       Wear sunscreen to prevent skin cancer.       See your dentist twice a year for an exam and cleaning.      See your eye doctor every 1 to 2 years to screen for conditions such as glaucoma, macular degeneration and cataracts.          Follow-ups after your visit        Your next 10 appointments already scheduled     Oct 16, 2017 11:30 AM CDT   Level 5 with RH INFUSION CHAIR 8   [facility] ([facility]    [facility]  [address]  [facility] [address]   [phone]            Nov 14, 2017 11:30 AM CST   Level 4 with RH INFUSION CHAIR 9   [facility])    [facility]  [address]  [facility] [address]   [phone]            Dec 12, 2017 11:00 AM CST   Level 4 with RH INFUSION CHAIR 12   [facility] ([facility])    [facility]  [address]  [facility] [address]   [phone]            Dec 12, 2017 11:15 AM CST   Return Visit with Jose Summers MD   [facility]    [facility]  [address]  [facility] [address]   [phone]              Who to contact     If you have questions or need follow up information about today's clinic visit or your schedule please contact [facility] directly at [phone].  Normal or non-critical lab and imaging results will be communicated to you by MyChart, letter or phone within 4 business days after the clinic has received the results. If you do not hear from us within 7 days, please contact the  clinic through [employer] or phone. If you have a critical or abnormal lab result, we will notify you by phone as soon as possible.  Submit refill requests through [employer] or call your pharmacy and they will forward the refill request to us. Please allow 3 business days for your refill to be completed.          Additional Information About Your Visit        Work[employer] Information     [employer] gives you secure access to your electronic health record. If you see a primary care provider, you can also send messages to your care team and make appointments. If you have questions, please call your primary care clinic.  If you do not have a primary care provider, please call [phone] and they will assist you.        Care EveryWhere ID     This is your Care EveryWhere ID. This could be used by other organizations to access your [place] medical records  [id number]        Your Vitals Were     Pulse Temperature Pulse Oximetry BMI (Body Mass Index)          74 97.9  F (36.6  C) (Oral) 96% 20.46 kg/m2         Blood Pressure from Last 3 Encounters:   10/09/17 110/60   09/19/17 120/61   08/22/17 115/44    Weight from Last 3 Encounters:   10/09/17 150 lb 14.4 oz (68.4 kg)   09/19/17 148 lb 3.2 oz (67.2 kg)   08/22/17 149 lb 11.1 oz (67.9 kg)              Today, you had the following     No orders found for display       Primary Care Provider Office Phone # Fax #    Monika SHANIQUE Jason [facility] [phone]       [address] [facility] [address]        Equal Access to Services     ZIA HENRIQUEZ : Hadii aad ku hadasho Soomaali, waaxda luqadaha, qaybta kaalmada adeegyada, naya leon. So [facility] [phone].    ATENCIÓN: Si habla español, tiene a dial disposición servicios gratuitos de asistencia lingüística. Llame al [phone].    We comply with applicable federal civil rights laws and [place] laws. We do not discriminate on the basis of race, color, national origin, age, disability,  sex, sexual orientation, or gender identity.            Thank you!     Thank you for choosing [facility]  for your care. Our goal is always to provide you with excellent care. Hearing back from our patients is one way we can continue to improve our services. Please take a few minutes to complete the written survey that you may receive in the mail after your visit with us. Thank you!             Your Updated Medication List - Protect others around you: Learn how to safely use, store and throw away your medicines at www.disposemymeds.org.          This list is accurate as of: 10/9/17 11:01 AM.  Always use your most recent med list.                   Brand Name Dispense Instructions for use Diagnosis    cyanocobalamin 1000 MCG tablet    vitamin  B-12      Anemia, Back pain       * fluocinolone 0.01 % solution    SYNALAR     Apply topically 2 times daily        * FLUOCINOLONE ACETONIDE SCALP 0.01 % Oil oil           folic acid 1 MG tablet    FOLVITE          ketoconazole 2 % shampoo    NIZORAL          levothyroxine 150 MCG tablet    SYNTHROID/LEVOTHROID    90 tablet    TAKE 1 TABLET BY MOUTH ONE TIME DAILY    Hypothyroidism, unspecified type       sulfamethoxazole-trimethoprim 800-160 MG per tablet    BACTRIM DS/SEPTRA DS          * Notice:  This list has 2 medication(s) that are the same as other medications prescribed for you. Read the directions carefully, and ask your doctor or other care provider to review them with you.

## 2017-10-09 NOTE — NURSING NOTE
"Chief Complaint   Patient presents with     Physical       Initial /60  Pulse 74  Temp 97.9  F (36.6  C) (Oral)  Wt 150 lb 14.4 oz (68.4 kg)  SpO2 96%  BMI 20.46 kg/m2 Estimated body mass index is 20.46 kg/(m^2) as calculated from the following:    Height as of 9/19/17: 6' 0.01\" (1.829 m).    Weight as of this encounter: 150 lb 14.4 oz (68.4 kg).  Medication Reconciliation: complete   Little KELLEY M.A.      "

## 2017-10-16 ENCOUNTER — INFUSION THERAPY VISIT (OUTPATIENT)
Dept: INFUSION THERAPY | Facility: CLINIC | Age: 77
End: 2017-10-16
Attending: INTERNAL MEDICINE
Payer: MEDICARE

## 2017-10-16 VITALS
DIASTOLIC BLOOD PRESSURE: 62 MMHG | SYSTOLIC BLOOD PRESSURE: 123 MMHG | TEMPERATURE: 97.6 F | BODY MASS INDEX: 20.66 KG/M2 | WEIGHT: 152.4 LBS | HEART RATE: 85 BPM | OXYGEN SATURATION: 97 % | RESPIRATION RATE: 16 BRPM

## 2017-10-16 DIAGNOSIS — D83.9 CVID (COMMON VARIABLE IMMUNODEFICIENCY) (H): Primary | ICD-10-CM

## 2017-10-16 PROCEDURE — 96375 TX/PRO/DX INJ NEW DRUG ADDON: CPT

## 2017-10-16 PROCEDURE — 96365 THER/PROPH/DIAG IV INF INIT: CPT

## 2017-10-16 PROCEDURE — 25000128 H RX IP 250 OP 636: Performed by: INTERNAL MEDICINE

## 2017-10-16 PROCEDURE — 96366 THER/PROPH/DIAG IV INF ADDON: CPT

## 2017-10-16 RX ORDER — DIPHENHYDRAMINE HCL 25 MG
25 CAPSULE ORAL
Status: CANCELLED | OUTPATIENT
Start: 2017-10-16

## 2017-10-16 RX ORDER — ACETAMINOPHEN 325 MG/1
650 TABLET ORAL
Status: CANCELLED
Start: 2017-10-16

## 2017-10-16 RX ADMIN — HUMAN IMMUNOGLOBULIN G 35 G: 20 LIQUID INTRAVENOUS at 11:57

## 2017-10-16 RX ADMIN — HYDROCORTISONE SODIUM SUCCINATE 100 MG: 100 INJECTION, POWDER, FOR SOLUTION INTRAMUSCULAR; INTRAVENOUS at 11:47

## 2017-10-16 NOTE — MR AVS SNAPSHOT
After Visit Summary   10/16/2017    Tricia Sosa    MRN: [id number]           Patient Information     Date Of Birth          1940        Visit Information        Provider Department      10/16/2017 11:30 AM RH INFUSION CHAIR 8 [facility]fusion [facility]        Today's Diagnoses     CVID (common variable immunodeficiency) (H)    -  1       Follow-ups after your visit        Your next 10 appointments already scheduled     Nov 10, 2017 10:15 AM CST   MA SCREENING DIGITAL BILATERAL with RMMA1   [facility] ([facility])    [address] [facility] [place] [address] [phone]           Do not use any powder, lotion or deodorant under your arms or on your breast. If you do, we will ask you to remove it before your exam.  Wear comfortable, two-piece clothing.  If you have any allergies, tell your care team.  Bring any previous mammograms from other facilities or have them mailed to the breast center.            Nov 14, 2017 11:30 AM CST   Level 4 with  INFUSION CHAIR 9   [facility] ([facility])    [facility]  [address]  [place] [address] [phone]            Nov 16, 2017 10:00 AM CST   (Arrive by 9:45 AM)   [facility] Spine with Estela Rocha PT   [facility] ([facility] [place])    [address] [facility] [address] [phone]            Dec 12, 2017 11:00 AM CST   Level 4 with  INFUSION CHAIR 5   [facility] ([facility])    [facility] [address]  [place] [address] [phone]            Dec 12, 2017 11:15 AM CST   Return Visit with Jose Summers MD   [facility] ([facility])    [facility]  [address]  [place] [address]    [phone]              Who to contact     If you have questions or need follow up information about today's clinic visit or your schedule please contact [facility] directly at [phone].  Normal or non-critical lab and imaging results will be communicated to you by MyChart, letter or phone within 4 business days after the clinic has received the results. If you do not hear from us within 7 days, please contact the clinic through MyChart or phone. If you have a critical or abnormal lab result, we will notify you by phone as soon as possible.  Submit refill requests through [employer] or call your pharmacy and they will forward the refill request to us. Please allow 3 business days for your refill to be completed.          Additional Information About Your Visit        [employer] Information     [employer] gives you secure access to your electronic health record. If you see a primary care provider, you can also send messages to your care team and make appointments. If you have questions, please call your primary care clinic.  If you do not have a primary care provider, please call [phone] and they will assist you.        Care EveryWhere ID     This is your Care EveryWhere ID. This could be used by other organizations to access your [place] medical records  [id number]        Your Vitals Were     Pulse Temperature Respirations Pulse Oximetry BMI (Body Mass Index)       85 97.6  F (36.4  C) (Tympanic) 16 97% 20.66 kg/m2        Blood Pressure from Last 3 Encounters:   10/16/17 123/62   10/09/17 110/60   09/19/17 120/61    Weight from Last 3 Encounters:   10/16/17 69.1 kg (152 lb 6.4 oz)   10/09/17 68.4 kg (150 lb 14.4 oz)   09/19/17 67.2 kg (148 lb 3.2 oz)              Today, you had the following     No orders found for display       Primary Care Provider Office Phone # Fax #    SHANIQUE Walsh Ra [facility] [phone]       [address]  [facility] [address]         Equal Access to Services     [facility] : Hadii aad ku hadharmanpina Kiahali, walesada luqadaha, qaybta jeanethmaurilionaya uribe. So [facility] [phone].    ATENCIÓN: Si habla maritza, tiene a dial disposición servicios gratuitos de asistencia lingüística. Sindyame al [phone].    We comply with applicable federal civil rights laws and [place] laws. We do not discriminate on the basis of race, color, national origin, age, disability, sex, sexual orientation, or gender identity.            Thank you!     Thank you for choosing [facility]  for your care. Our goal is always to provide you with excellent care. Hearing back from our patients is one way we can continue to improve our services. Please take a few minutes to complete the written survey that you may receive in the mail after your visit with us. Thank you!             Your Updated Medication List - Protect others around you: Learn how to safely use, store and throw away your medicines at www.disposemymeds.org.          This list is accurate as of: 10/16/17  1:55 PM.  Always use your most recent med list.                   Brand Name Dispense Instructions for use Diagnosis    cyanocobalamin 1000 MCG tablet    vitamin  B-12      Anemia, Back pain       * fluocinolone 0.01 % solution    SYNALAR     Apply topically 2 times daily        * Fluocinolone Acetonide Scalp 0.01 % Oil oil           folic acid 1 MG tablet    FOLVITE          ketoconazole 2 % shampoo    NIZORAL          levothyroxine 150 MCG tablet    SYNTHROID/LEVOTHROID    90 tablet    TAKE 1 TABLET BY MOUTH ONE TIME DAILY    Hypothyroidism, unspecified type       sulfamethoxazole-trimethoprim 800-160 MG per tablet    BACTRIM DS/SEPTRA DS          * Notice:  This list has 2 medication(s) that are the same as other medications prescribed for you. Read the directions carefully, and ask your doctor or other care provider to review them  with you.

## 2017-10-16 NOTE — PROGRESS NOTES
"Infusion Nursing Note:  Tricia Sosa presents today for IVIG.    Patient seen by provider today: No   present during visit today: Not Applicable.    Note: N/A.    Intravenous Access:  Peripheral IV placed.    Treatment Conditions:  Rheumatology Infusion Checklist: PRIOR TO INFUSION OF BIOLOGICAL MEDICATIONS OR ANY OF THESE AS LISTED: Immune Globulin (IVIG) \".rheumbiologicalchecklist\"    Prior to Infusion of biological medications or any of these as listed:    1. Elevated temperature, fever, chills, productive cough or abnormal vital signs, night sweats, coughing up blood or sputum, no appetite or abnormal vital signs : NO    2. Open wounds or new incisions: NO    3. Recent hospitalization: NO    4.  Recent surgeries:  NO    5. Any upcoming surgeries or dental procedures?:NO    6. Any current or recent bouts of illness or infection? On any antibiotics? : NO    7. Any new, sudden or worsening abdominal pain :NO    8. Vaccination within 4 weeks? Patient or someone in the household is scheduled to receive vaccination? No live virus vaccines prior to or during treatment :NO    9. Any nervous system diseases [i.e. multiple sclerosis, Guillain-[place], seizures, neurological  changes]: NO    10. Pregnant or breast feeding; or plans on pregnancy in the future: NO    11. Signs of worsening depression or suicidal ideations while taking benlysta:NO    12. New-onset medical symptoms: NO    13.  New cancer diagnosis or on chemotherapy or radiation NO    14.  Evaluate for any sign of active TB [Unexplained weight loss, Loss of appetite, Night sweats, Fever, Fatigue, Chills, Coughing for 3 weeks or longer, Hemoptysis (coughing up blood), Chest pain]: NO    **Note: If answered yes to any of the above, hold the infusion and contact ordering rheumatologist or on-call rheumatologist.   .        Post Infusion Assessment:  Patient tolerated infusion without incident.  Blood return noted pre and post infusion.  Site patent and " intact, free from redness, edema or discomfort.  No evidence of extravasations.  Access discontinued per protocol.    Discharge Plan:   Discharge instructions reviewed with: Patient.  Patient and/or family verbalized understanding of discharge instructions and all questions answered.  AVS to patient via [employer].  Patient will return 11/14/17 for next appointment.   Patient discharged in stable condition accompanied by: self.  Departure Mode: Ambulatory.    Katherin Clayton RN

## 2017-10-17 ENCOUNTER — TRANSFERRED RECORDS (OUTPATIENT)
Dept: HEALTH INFORMATION MANAGEMENT | Facility: CLINIC | Age: 77
End: 2017-10-17

## 2017-10-18 ENCOUNTER — DOCUMENTATION ONLY (OUTPATIENT)
Dept: FAMILY MEDICINE | Facility: CLINIC | Age: 77
End: 2017-10-18

## 2017-10-18 NOTE — PROGRESS NOTES
Recd records from [facility] 10/18/2017 and forwarded to Monika GALVEZ for review and scanning

## 2017-10-20 ENCOUNTER — OFFICE VISIT (OUTPATIENT)
Dept: FAMILY MEDICINE | Facility: CLINIC | Age: 77
End: 2017-10-20
Payer: COMMERCIAL

## 2017-10-20 VITALS
TEMPERATURE: 97.9 F | DIASTOLIC BLOOD PRESSURE: 74 MMHG | RESPIRATION RATE: 14 BRPM | WEIGHT: 150 LBS | BODY MASS INDEX: 20.34 KG/M2 | SYSTOLIC BLOOD PRESSURE: 138 MMHG | HEART RATE: 75 BPM

## 2017-10-20 DIAGNOSIS — R42 VERTIGO: Primary | ICD-10-CM

## 2017-10-20 DIAGNOSIS — R42 DISEQUILIBRIUM: ICD-10-CM

## 2017-10-20 DIAGNOSIS — E03.8 OTHER SPECIFIED HYPOTHYROIDISM: ICD-10-CM

## 2017-10-20 LAB
BASOPHILS # BLD AUTO: 0 10E9/L (ref 0–0.2)
BASOPHILS NFR BLD AUTO: 0.2 %
DIFFERENTIAL METHOD BLD: NORMAL
EOSINOPHIL # BLD AUTO: 0.1 10E9/L (ref 0–0.7)
EOSINOPHIL NFR BLD AUTO: 1.7 %
ERYTHROCYTE [DISTWIDTH] IN BLOOD BY AUTOMATED COUNT: 13.9 % (ref 10–15)
HCT VFR BLD AUTO: 36.6 % (ref 35–47)
HGB BLD-MCNC: 12.4 G/DL (ref 11.7–15.7)
LYMPHOCYTES # BLD AUTO: 0.8 10E9/L (ref 0.8–5.3)
LYMPHOCYTES NFR BLD AUTO: 16.9 %
MCH RBC QN AUTO: 30.2 PG (ref 26.5–33)
MCHC RBC AUTO-ENTMCNC: 33.9 G/DL (ref 31.5–36.5)
MCV RBC AUTO: 89 FL (ref 78–100)
MONOCYTES # BLD AUTO: 0.8 10E9/L (ref 0–1.3)
MONOCYTES NFR BLD AUTO: 16.7 %
NEUTROPHILS # BLD AUTO: 3.1 10E9/L (ref 1.6–8.3)
NEUTROPHILS NFR BLD AUTO: 64.5 %
PLATELET # BLD AUTO: 155 10E9/L (ref 150–450)
RBC # BLD AUTO: 4.1 10E12/L (ref 3.8–5.2)
WBC # BLD AUTO: 4.8 10E9/L (ref 4–11)

## 2017-10-20 PROCEDURE — 80048 BASIC METABOLIC PNL TOTAL CA: CPT | Performed by: PHYSICIAN ASSISTANT

## 2017-10-20 PROCEDURE — 99214 OFFICE O/P EST MOD 30 MIN: CPT | Performed by: PHYSICIAN ASSISTANT

## 2017-10-20 PROCEDURE — 84443 ASSAY THYROID STIM HORMONE: CPT | Performed by: PHYSICIAN ASSISTANT

## 2017-10-20 PROCEDURE — 85025 COMPLETE CBC W/AUTO DIFF WBC: CPT | Performed by: PHYSICIAN ASSISTANT

## 2017-10-20 PROCEDURE — 36415 COLL VENOUS BLD VENIPUNCTURE: CPT | Performed by: PHYSICIAN ASSISTANT

## 2017-10-20 RX ORDER — MECLIZINE HYDROCHLORIDE 25 MG/1
25 TABLET ORAL EVERY 6 HOURS PRN
Qty: 30 TABLET | Refills: 1 | Status: SHIPPED | OUTPATIENT
Start: 2017-10-20 | End: 2017-12-12

## 2017-10-20 NOTE — PATIENT INSTRUCTIONS
Benign Paroxysmal Positional Vertigo     Your health care provider may move your head in certain ways to treat your BPPV.     Benign paroxysmal positional vertigo (BPPV) is a problem with the inner ear. The inner ear contains the vestibular system. This system is what helps you keep your balance. BPPV causes a feeling of spinning. It is a common problem of the vestibular system.  Understanding the vestibular system  The vestibular system of the ear is made up of very tiny parts. They include the utricle, saccule, and semicircular canals. The utricle is a tiny organ that contains calcium crystals. In some people, the crystals can move into the semicircular canals. When this happens, the system no longer works as it should. This causes BPPV. Benign means it is not life threatening. Paroxysmal means it happens suddenly. Positional means that it happens when you move your head. Vertigo is a feeling of spinning.  What causes BPPV?  Causes include injury to your head or neck. Other problems with the vestibular system may cause BPPV. In many people, the cause of BPPV is not known.  Symptoms of BPPV  You many have repeated feelings of spinning (vertigo). The vertigo usually lasts less than 1 minute. Some movements, such as rolling over in bed, can bring on vertigo.  Diagnosing BPPV  Your primary healthcare provider may diagnose and treat your BPPV. Or you may see an ear, nose, and throat doctor (otolaryngologist). In some cases, you may see a nervous system doctor (neurologist).  The healthcare provider will ask about your symptoms and your medical history. He or she will examine you. You may have hearing and balance tests. As part of the exam, your healthcare provider may have you move your head and body in certain ways. If you have BPPV, the movements can bring on vertigo. Your provider will also look for abnormal movements of your eyes. You may have other tests to check your vestibular or nervous systems.  Treatment  for BPPV  Your healthcare provider may try to move the calcium crystals. This is done by having you move your head and neck in certain ways. This treatment is safe and often works well. You may also be told to do these movements at home. You may still have vertigo for a few weeks. Your healthcare provider will recheck your symptoms, usually in about a month. Special physical therapy may also be part of treatment. In rare cases, surgery may be needed for BPPV that does not go away.     When to call the healthcare provider  Call your healthcare provider right away if you have any of these:    Symptoms that do not go away with treatment    Symptoms that get worse    New symptoms   Date Last Reviewed: 5/1/2017 2000-2017 [employer]. [address] [place] [address], [place] [address]. All rights reserved. This information is not intended as a substitute for professional medical care. Always follow your healthcare professional's instructions.

## 2017-10-20 NOTE — PROGRESS NOTES
"  SUBJECTIVE:   Tricia Sosa is a 76 year old female who presents to clinic today for the following health issues:      Dizziness  Onset: X 3 days    Description:   Do you feel faint:  no   Does it feel like the surroundings (bed, room) are moving: no   Unsteady/off balance: YES  Have you passed out or fallen: no     Intensity: moderate, severe     Progression of Symptoms:  worsening    Accompanying Signs & Symptoms:  Heart palpitations: no. No chest pain or shortness of breath.   Nausea, vomiting: no   Weakness in arms or legs: no   Fatigue: no, but fell asleep earlier then usual last night   Vision or speech changes: YES- vision feels \"strange\". No blurred vision or vision loss. Troubles focusing   Ringing in ears (Tinnitus): no   Hearing Loss: no     History:   Head trauma/concussion hx: YES- subdural hematoma 2013  Previous similar symptoms: YES  Recent bleeding history: no     Precipitating factors:   Worse with activity or head movement: YES  Any new medications (BP?): no   Alcohol/drug abuse/withdrawal: no     Alleviating factors:   Does staying in a fixed position give relief:  YES- slightly     Therapies Tried and outcome: none      Of note, patient states has had bppv in the past, which required physical therapy.       Problem list and histories reviewed & adjusted, as indicated.  Additional history: as documented    Patient Active Problem List   Diagnosis     Traction detachment of retina     Hypothyroidism     CVID (common variable immunodeficiency) (H)     Hemolytic anemia (H)     B12 deficiency     CARDIOVASCULAR SCREENING; LDL GOAL LESS THAN 160     Cervicalgia     Other autoimmune hemolytic anemias (H)     Past Surgical History:   Procedure Laterality Date     C LIGATE FALLOPIAN TUBE       C THYROIDECTOMY       COLONOSCOPY N/A 4/26/2016    Procedure: COLONOSCOPY;  Surgeon: Dontae Del Rio MD;  Location:  GI      COLONOSCOPY W BIOPSY  4/26/00      COLONOSCOPY W BIOPSY  10/8/03      " COLONOSCOPY W BIOPSY  05    REPEAT IN 5 YEARS     HC CYSTOSCOPY,INSERT URETERAL STENT      Ureteral stent insertion     HC FLEX SIGMOIDOSCOPY W BIOPSY  00     HC LAPAROSCOPY, SURGICAL; CHOLECYSTECTOMY       LIGATION OF HEMORRHOID(S)      Hemorrhoidectomy     UPPER GI ENDOSCOPY,BIOPSY  10/01/01       Social History   Substance Use Topics     Smoking status: Never Smoker     Smokeless tobacco: Never Used     Alcohol use No     Family History   Problem Relation Age of Onset     CANCER Mother       of colon cancer at age 90     CEREBROVASCULAR DISEASE Father       at age 85     Dementia Brother      Dementia Brother      Dementia Brother      pancreatic cancer     Colon Cancer No family hx of          Current Outpatient Prescriptions   Medication Sig Dispense Refill     meclizine (ANTIVERT) 25 MG tablet Take 1 tablet (25 mg) by mouth every 6 hours as needed for dizziness 30 tablet 1     FLUOCINOLONE ACETONIDE SCALP 0.01 % OIL oil        levothyroxine (SYNTHROID/LEVOTHROID) 150 MCG tablet TAKE 1 TABLET BY MOUTH ONE TIME DAILY  90 tablet 2     sulfamethoxazole-trimethoprim (BACTRIM DS/SEPTRA DS) 800-160 MG per tablet   3     ketoconazole (NIZORAL) 2 % shampoo   11     cyanocobalamin (VITAMIN  B-12) 1000 MCG tablet   3     folic acid (FOLVITE) 1 MG tablet   3     Allergies   Allergen Reactions     Doxycycline      BP Readings from Last 3 Encounters:   10/20/17 138/74   10/16/17 123/62   10/09/17 110/60    Wt Readings from Last 3 Encounters:   10/20/17 150 lb (68 kg)   10/16/17 152 lb 6.4 oz (69.1 kg)   10/09/17 150 lb 14.4 oz (68.4 kg)                        Reviewed and updated as needed this visit by clinical staffTobacco  Allergies  Meds  Problems  Med Hx  Surg Hx  Fam Hx  Soc Hx        Reviewed and updated as needed this visit by Provider  Allergies  Meds  Problems         ROS:  Constitutional, HEENT, neuro, psych, cardiovascular, pulmonary, gi and gu systems are negative, except as  otherwise noted.      OBJECTIVE:   /74 (BP Location: Right arm, Patient Position: Chair, Cuff Size: Adult Regular)  Pulse 75  Temp 97.9  F (36.6  C) (Oral)  Resp 14  Wt 150 lb (68 kg)  BMI 20.34 kg/m2  Body mass index is 20.34 kg/(m^2).  GENERAL: healthy, alert and no distress  EYES: horizontal nystagmus noted with EOM. Otherwise, Eyes grossly normal to inspection, PERRL and conjunctivae and sclerae normal  HENT: ear canals and TM's normal, nose and mouth without ulcers or lesions  NECK: no adenopathy, no asymmetry, masses, or scars and thyroid normal to palpation  RESP: lungs clear to auscultation - no rales, rhonchi or wheezes  CV: regular rates and rhythm, normal S1 S2, no S3 or S4 and no murmur, click or rub  MS: no gross musculoskeletal defects noted, no edema  SKIN: no suspicious lesions or rashes  NEURO: Normal strength and tone, sensory exam grossly normal, mentation intact, speech normal, cranial nerves 2-12 intact, gait abnormal: unable to heel to toe walk, rapid alternating movements normal and finger to nose normal, heel to shin normal. Jossie-Hallpike positive to symptoms on left.   PSYCH: mentation appears normal, affect normal/bright    Diagnostic Test Results:  none     ASSESSMENT/PLAN:     (R42) Vertigo  (primary encounter diagnosis)  Comment: given history of similar symptoms, lack of tinnitus, nystagmus, and positive jossie-hallpike, BPPV felt most likely diagnosis. However, central etiology is possible and concerns for this patient specifically given past history of subdural hematoma, but lack of focal neurologic abnormalities is reassuring. This in combo with lack of trauma is reassuring against hematoma. However, if symptoms fail to improve with home therapy discussed and prn meclizine in 4-5 days, will obtain MRI. If worsening, patient should go to ER. Of note, patient also has history of hypothyroidism and anemia. Will evaluate for these as possible causes as well as electrolyte  abnormalities. Patient also is taking monthly IVIG infusions and last Monday states her infusion rate was increased. This may be possible side effect as well.   Plan: TSH with free T4 reflex, Basic metabolic panel,        CBC with platelets and differential, meclizine         (ANTIVERT) 25 MG tablet        -Medication use and side effects discussed with the patient. Patient is in complete understanding and agreement with plan.       (R42) Disequilibrium  Comment: as above. Has this at baseline. Likely atrophy of cerebellum from age. Is starting physical therapy in a few weeks. Order placed to aid with this as well  Plan: [facility] PT, HAND, AND CHIROPRACTIC REFERRAL            (E03.8) Other specified hypothyroidism  Comment:   Plan: TSH with free T4 reflex              Follow up: as above     Emmett Mclaughlin PA-C  [facility]

## 2017-10-20 NOTE — NURSING NOTE
"Chief Complaint   Patient presents with     Dizziness     X 3 days       Initial /78 (BP Location: Right arm, Patient Position: Chair, Cuff Size: Adult Regular)  Pulse 75  Temp 97.9  F (36.6  C) (Oral)  Resp 14  Wt 150 lb (68 kg)  BMI 20.34 kg/m2 Estimated body mass index is 20.34 kg/(m^2) as calculated from the following:    Height as of 17: 6' 0.01\" (1.829 m).    Weight as of this encounter: 150 lb (68 kg).   2nd blood pressure readin/74 sitting in chair, right arm, using adult large cuff  Medication Reconciliation: complete   Raphael Brooks MA      "

## 2017-10-20 NOTE — MR AVS SNAPSHOT
After Visit Summary   10/20/2017    Tricia Sosa    MRN: [id number]           Patient Information     Date Of Birth          1940        Visit Information        Provider Department      10/20/2017 9:45 AM Emmett Mclaughlin PA-C [facility]        Today's Diagnoses     Vertigo    -  1    Disequilibrium        Other specified hypothyroidism          Care Instructions      Benign Paroxysmal Positional Vertigo     Your health care provider may move your head in certain ways to treat your BPPV.     Benign paroxysmal positional vertigo (BPPV) is a problem with the inner ear. The inner ear contains the vestibular system. This system is what helps you keep your balance. BPPV causes a feeling of spinning. It is a common problem of the vestibular system.  Understanding the vestibular system  The vestibular system of the ear is made up of very tiny parts. They include the utricle, saccule, and semicircular canals. The utricle is a tiny organ that contains calcium crystals. In some people, the crystals can move into the semicircular canals. When this happens, the system no longer works as it should. This causes BPPV. Benign means it is not life threatening. Paroxysmal means it happens suddenly. Positional means that it happens when you move your head. Vertigo is a feeling of spinning.  What causes BPPV?  Causes include injury to your head or neck. Other problems with the vestibular system may cause BPPV. In many people, the cause of BPPV is not known.  Symptoms of BPPV  You many have repeated feelings of spinning (vertigo). The vertigo usually lasts less than 1 minute. Some movements, such as rolling over in bed, can bring on vertigo.  Diagnosing BPPV  Your primary healthcare provider may diagnose and treat your BPPV. Or you may see an ear, nose, and throat doctor (otolaryngologist). In some cases, you may see a nervous system doctor (neurologist).  The healthcare provider will ask  about your symptoms and your medical history. He or she will examine you. You may have hearing and balance tests. As part of the exam, your healthcare provider may have you move your head and body in certain ways. If you have BPPV, the movements can bring on vertigo. Your provider will also look for abnormal movements of your eyes. You may have other tests to check your vestibular or nervous systems.  Treatment for BPPV  Your healthcare provider may try to move the calcium crystals. This is done by having you move your head and neck in certain ways. This treatment is safe and often works well. You may also be told to do these movements at home. You may still have vertigo for a few weeks. Your healthcare provider will recheck your symptoms, usually in about a month. Special physical therapy may also be part of treatment. In rare cases, surgery may be needed for BPPV that does not go away.     When to call the healthcare provider  Call your healthcare provider right away if you have any of these:    Symptoms that do not go away with treatment    Symptoms that get worse    New symptoms   Date Last Reviewed: 5/1/2017 2000-2017 [employer]. [address] [place] [address]. All rights reserved. This information is not intended as a substitute for professional medical care. Always follow your healthcare professional's instructions.                Follow-ups after your visit        Additional Services     [facility] PT, HAND, AND CHIROPRACTIC REFERRAL       **This order will print in the [facility] Scheduling Office**    Physical Therapy, Hand Therapy and Chiropractic Care are available through:    *[facility]  *[facility]  *[facility]    Call one number to schedule at any of the above locations: [phone].    Your provider has referred you to: Physical Therapy at [facility] or [facility]    Indication/Reason for Referral: disequilibrium  Onset of Illness:  years  Therapy Orders: Evaluate and Treat  Special Programs: None  Special Request: None    Mayra Nunez      Additional Comments for the Therapist or Chiropractor:     Please be aware that coverage of these services is subject to the terms and limitations of your health insurance plan.  Call member services at your health plan with any benefit or coverage questions.      Please bring the following to your appointment:    *Your personal calendar for scheduling future appointments  *Comfortable clothing                  Your next 10 appointments already scheduled     Nov 10, 2017 10:15 AM CST   MA SCREENING DIGITAL BILATERAL with RMMA1   [facility]    [address] [facility] [address] [phone]           Do not use any powder, lotion or deodorant under your arms or on your breast. If you do, we will ask you to remove it before your exam.  Wear comfortable, two-piece clothing.  If you have any allergies, tell your care team.  Bring any previous mammograms from other facilities or have them mailed to the breast center.            Nov 14, 2017 11:30 AM CST   Level 4 with RH INFUSION CHAIR 9   [facility] ([facility])    [facility]  [address]  [facility] [address]   [phone]            Nov 16, 2017 10:00 AM CST   (Arrive by 9:45 AM)   [facility] Spine with Estela Rocha PT   [facility] ([facility])    [address] [facility] [address] [phone]            Dec 12, 2017 11:00 AM CST   Level 4 with RH INFUSION CHAIR 5   [facility] ([facility])    [facility]  [address]  [place] [address]   [phone]            Dec 12, 2017 11:15 AM CST   Return Visit with Jose Summers MD   [facility]  [facility])    [facility] [place]  [address] [place]  [address]  [facility] [id number] [phone]              Who to contact     If you have questions or need follow up information about today's clinic visit or your schedule please contact [facility] directly at [phone].  Normal or non-critical lab and imaging results will be communicated to you by MyChart, letter or phone within 4 business days after the clinic has received the results. If you do not hear from us within 7 days, please contact the clinic through [email or web address] or phone. If you have a critical or abnormal lab result, we will notify you by phone as soon as possible.  Submit refill requests through [employer] or call your pharmacy and they will forward the refill request to us. Please allow 3 business days for your refill to be completed.          Additional Information About Your Visit        [email or web address] Information     [employer] gives you secure access to your electronic health record. If you see a primary care provider, you can also send messages to your care team and make appointments. If you have questions, please call your primary care clinic.  If you do not have a primary care provider, please call [phone] and they will assist you.        Care EveryWhere ID     This is your Care EveryWhere ID. This could be used by other organizations to access your [place] medical records  [id number]        Your Vitals Were     Pulse Temperature Respirations BMI (Body Mass Index)          75 97.9  F (36.6  C) (Oral) 14 20.34 kg/m2         Blood Pressure from Last 3 Encounters:   10/20/17 138/74   10/16/17 123/62   10/09/17 110/60    Weight from Last 3 Encounters:   10/20/17 150 lb (68 kg)   10/16/17 152 lb 6.4 oz (69.1 kg)   10/09/17 150 lb 14.4 oz (68.4 kg)              We Performed the Following     Basic metabolic panel     CBC with platelets and differential     [facility] PT, HAND, AND CHIROPRACTIC REFERRAL     TSH with free  T4 reflex        Primary Care Provider Office Phone # Fax #    SHANIQUE Walsh [facility] [phone]       [address]  [facility] [address]        Equal Access to Services     ZIA HENRIQUEZ : Hadii carlin ku hadharmano Soomaali, waaxda luqadaha, qaybta kaalmada adeegyada, waxclement pérezn marek pitts laYusracarol leon. So [facility] [phone].    ATENCIÓN: Si habla español, tiene a dial disposición servicios gratuitos de asistencia lingüística. Llame al [phone].    We comply with applicable federal civil rights laws and [place] laws. We do not discriminate on the basis of race, color, national origin, age, disability, sex, sexual orientation, or gender identity.            Thank you!     Thank you for choosing [facility]  for your care. Our goal is always to provide you with excellent care. Hearing back from our patients is one way we can continue to improve our services. Please take a few minutes to complete the written survey that you may receive in the mail after your visit with us. Thank you!             Your Updated Medication List - Protect others around you: Learn how to safely use, store and throw away your medicines at www.disposemymeds.org.          This list is accurate as of: 10/20/17 10:34 AM.  Always use your most recent med list.                   Brand Name Dispense Instructions for use Diagnosis    cyanocobalamin 1000 MCG tablet    vitamin  B-12      Anemia, Back pain       Fluocinolone Acetonide Scalp 0.01 % Oil oil           folic acid 1 MG tablet    FOLVITE          ketoconazole 2 % shampoo    NIZORAL          levothyroxine 150 MCG tablet    SYNTHROID/LEVOTHROID    90 tablet    TAKE 1 TABLET BY MOUTH ONE TIME DAILY    Hypothyroidism, unspecified type       sulfamethoxazole-trimethoprim 800-160 MG per tablet    BACTRIM DS/SEPTRA DS

## 2017-10-21 LAB
ANION GAP SERPL CALCULATED.3IONS-SCNC: 4 MMOL/L (ref 3–14)
BUN SERPL-MCNC: 12 MG/DL (ref 7–30)
CALCIUM SERPL-MCNC: 8.6 MG/DL (ref 8.5–10.1)
CHLORIDE SERPL-SCNC: 104 MMOL/L (ref 94–109)
CO2 SERPL-SCNC: 29 MMOL/L (ref 20–32)
CREAT SERPL-MCNC: 0.61 MG/DL (ref 0.52–1.04)
GFR SERPL CREATININE-BSD FRML MDRD: >90 ML/MIN/1.7M2
GLUCOSE SERPL-MCNC: 73 MG/DL (ref 70–99)
POTASSIUM SERPL-SCNC: 4.4 MMOL/L (ref 3.4–5.3)
SODIUM SERPL-SCNC: 137 MMOL/L (ref 133–144)
TSH SERPL DL<=0.005 MIU/L-ACNC: 1.9 MU/L (ref 0.4–4)

## 2017-11-10 ENCOUNTER — RADIANT APPOINTMENT (OUTPATIENT)
Dept: MAMMOGRAPHY | Facility: CLINIC | Age: 77
End: 2017-11-10
Payer: COMMERCIAL

## 2017-11-10 DIAGNOSIS — Z12.31 VISIT FOR SCREENING MAMMOGRAM: ICD-10-CM

## 2017-11-10 PROCEDURE — G0202 SCR MAMMO BI INCL CAD: HCPCS | Mod: TC

## 2017-11-14 ENCOUNTER — INFUSION THERAPY VISIT (OUTPATIENT)
Dept: INFUSION THERAPY | Facility: CLINIC | Age: 77
End: 2017-11-14
Attending: INTERNAL MEDICINE
Payer: MEDICARE

## 2017-11-14 VITALS — DIASTOLIC BLOOD PRESSURE: 59 MMHG | TEMPERATURE: 97.3 F | SYSTOLIC BLOOD PRESSURE: 142 MMHG | HEART RATE: 95 BPM

## 2017-11-14 DIAGNOSIS — D83.9 CVID (COMMON VARIABLE IMMUNODEFICIENCY) (H): Primary | ICD-10-CM

## 2017-11-14 PROCEDURE — 96365 THER/PROPH/DIAG IV INF INIT: CPT

## 2017-11-14 PROCEDURE — 96375 TX/PRO/DX INJ NEW DRUG ADDON: CPT

## 2017-11-14 PROCEDURE — 96366 THER/PROPH/DIAG IV INF ADDON: CPT

## 2017-11-14 PROCEDURE — 25000128 H RX IP 250 OP 636: Performed by: INTERNAL MEDICINE

## 2017-11-14 RX ORDER — DIPHENHYDRAMINE HCL 25 MG
25 CAPSULE ORAL
Status: CANCELLED | OUTPATIENT
Start: 2017-11-14

## 2017-11-14 RX ORDER — ACETAMINOPHEN 325 MG/1
650 TABLET ORAL
Status: CANCELLED
Start: 2017-11-14

## 2017-11-14 RX ADMIN — HUMAN IMMUNOGLOBULIN G 35 G: 20 LIQUID INTRAVENOUS at 12:20

## 2017-11-14 RX ADMIN — HYDROCORTISONE SODIUM SUCCINATE 100 MG: 100 INJECTION, POWDER, FOR SOLUTION INTRAMUSCULAR; INTRAVENOUS at 12:13

## 2017-11-14 RX ADMIN — SODIUM CHLORIDE 1000 ML: 9 INJECTION, SOLUTION INTRAVENOUS at 12:11

## 2017-11-14 NOTE — MR AVS SNAPSHOT
After Visit Summary   11/14/2017    Tricia Sosa    MRN: [id number]           Patient Information     Date Of Birth          1940        Visit Information        Provider Department      11/14/2017 11:30 AM RH INFUSION CHAIR 9 [facility]        Today's Diagnoses     CVID (common variable immunodeficiency) (H)    -  1       Follow-ups after your visit        Your next 10 appointments already scheduled     Nov 16, 2017 10:00 AM CST   (Arrive by 9:45 AM)   BRENDA Spine with Estela Rocha PT   [facility] [place] [facility] ([facility] [place])    [address]  [place] [address]   [phone]            Dec 12, 2017 11:00 AM CST   Level 4 with RH INFUSION CHAIR 5   [facility] Infusion Services ([facility])    [facility]  [address]  [facility] [id number] [phone]            Dec 12, 2017 11:15 AM CST   Return Visit with Jose Summers MD   [facility] ([facility])    [facility]  [address]  [facility] [id number] [phone]              Who to contact     If you have questions or need follow up information about today's clinic visit or your schedule please contact [facility] INFUSION SERVICES directly at [phone].  Normal or non-critical lab and imaging results will be communicated to you by MyChart, letter or phone within 4 business days after the clinic has received the results. If you do not hear from us within 7 days, please contact the clinic through MyChart or phone. If you have a critical or abnormal lab result, we will notify you by phone as soon as possible.  Submit refill requests through ZapMe or call your pharmacy and they will forward the refill request to us. Please allow 3 business days for your refill to be completed.          Additional  Information About Your Visit        MyChart Information     [employer] gives you secure access to your electronic health record. If you see a primary care provider, you can also send messages to your care team and make appointments. If you have questions, please call your primary care clinic.  If you do not have a primary care provider, please call [phone] and they will assist you.        Care EveryWhere ID     This is your Care EveryWhere ID. This could be used by other organizations to access your [place] medical records  [id number]        Your Vitals Were     Pulse Temperature                95 97.3  F (36.3  C) (Tympanic)           Blood Pressure from Last 3 Encounters:   11/14/17 142/59   10/20/17 138/74   10/16/17 123/62    Weight from Last 3 Encounters:   10/20/17 68 kg (150 lb)   10/16/17 69.1 kg (152 lb 6.4 oz)   10/09/17 68.4 kg (150 lb 14.4 oz)              Today, you had the following     No orders found for display       Primary Care Provider Office Phone # Fax #    Monika SHANIQUE Jason [facility] [phone] [address] [facility] [address]        Equal Access to Services     ELVIN HENRIQUEZ AH: Hadii aad ku hadasho Soomaali, waaxda luqadaha, qaybta kaalmada adeegyada, waxclement santiago haycarlos manueln marek pitts lageronimo leon. So [facility] [phone].    ATENCIÓN: Si habla español, tiene a dial disposición servicios gratuitos de asistencia lingüística. [facility] [phone].    We comply with applicable federal civil rights laws and [place] laws. We do not discriminate on the basis of race, color, national origin, age, disability, sex, sexual orientation, or gender identity.            Thank you!     Thank you for choosing [facility] INFUSION SERVICES  for your care. Our goal is always to provide you with excellent care. Hearing back from our patients is one way we can continue to improve our services. Please take a few minutes to complete the written survey that you may receive  in the mail after your visit with us. Thank you!             Your Updated Medication List - Protect others around you: Learn how to safely use, store and throw away your medicines at www.disposemymeds.org.          This list is accurate as of: 11/14/17  2:34 PM.  Always use your most recent med list.                   Brand Name Dispense Instructions for use Diagnosis    cyanocobalamin 1000 MCG tablet    vitamin  B-12      Anemia, Back pain       Fluocinolone Acetonide Scalp 0.01 % Oil oil           folic acid 1 MG tablet    FOLVITE          ketoconazole 2 % shampoo    NIZORAL          levothyroxine 150 MCG tablet    SYNTHROID/LEVOTHROID    90 tablet    TAKE 1 TABLET BY MOUTH ONE TIME DAILY    Hypothyroidism, unspecified type       meclizine 25 MG tablet    ANTIVERT    30 tablet    Take 1 tablet (25 mg) by mouth every 6 hours as needed for dizziness    Vertigo       sulfamethoxazole-trimethoprim 800-160 MG per tablet    BACTRIM DS/SEPTRA DS

## 2017-11-14 NOTE — PROGRESS NOTES
"Infusion Nursing Note:  Tricia Sosa presents today for IVIG and premeds    Patient seen by provider today: No   present during visit today: Not Applicable.    Note: Pt states she developed vertigo about two days post IVIG last month.  Went to the Dr after three days of persistent symptoms.  Was prescribed Meclinzine, but pt was unsure that it was that effective.  Only took a few tabs, as she felt her symptoms had improved without it.  Unsure if dizziness was r/t IVIG (infusion was increased to faster rate last month) or just coincidental occurrence. Discussed with pharmacy.  Not likely due to the rate of the infusion, but pharmacy states that IVIG can be dehydrating, so if pt was not hydrating well, this could have caused some of the symptoms.  Hard to tell at this point.  Pt encouraged to push fluids before next IVIG appt and will push fluids for the next 24-48 hours following today's infusion.    IVIG was initiated at 0.5mg/kg and increased by 1.0mg/kg every 15min to max of 4mg/kg.      Intravenous Access:  Peripheral IV placed.    Treatment Conditions:  Rheumatology Infusion Checklist: PRIOR TO INFUSION OF BIOLOGICAL MEDICATIONS OR ANY OF THESE AS LISTED: Immune Globulin (IVIG) \".rheumbiologicalchecklist\"    Prior to Infusion of biological medications or any of these as listed:    1. Elevated temperature, fever, chills, productive cough or abnormal vital signs, night sweats, coughing up blood or sputum, no appetite or abnormal vital signs : NO    2. Open wounds or new incisions: NO    3. Recent hospitalization: NO    4.  Recent surgeries:  NO    5. Any upcoming surgeries or dental procedures?:NO    6. Any current or recent bouts of illness or infection? On any antibiotics? : NO    7. Any new, sudden or worsening abdominal pain :NO    8. Vaccination within 4 weeks? Patient or someone in the household is scheduled to receive vaccination? No live virus vaccines prior to or during treatment :NO    9. " Any nervous system diseases [i.e. multiple sclerosis, Guillain-[place], seizures, neurological  changes]: NO    10. Pregnant or breast feeding; or plans on pregnancy in the future: NO    11. Signs of worsening depression or suicidal ideations:NO    12. New-onset medical symptoms: NO    13.  New cancer diagnosis or on chemotherapy or radiation NO    14.  Evaluate for any sign of active TB [Unexplained weight loss, Loss of appetite, Night sweats, Fever, Fatigue, Chills, Coughing for 3 weeks or longer, Hemoptysis (coughing up blood), Chest pain]: NO    **Note: If answered yes to any of the above, hold the infusion and contact ordering rheumatologist or on-call rheumatologist.   .        Post Infusion Assessment:  Patient tolerated infusion without incident.  Blood return noted pre and post infusion.  Site patent and intact, free from redness, edema or discomfort.  No evidence of extravasations.  Access discontinued per protocol.    Discharge Plan:   Discharge instructions reviewed with: Patient.  Patient and/or family verbalized understanding of discharge instructions and all questions answered.  AVS to patient via Agolo.  Patient will return 12/12/17 for RC/IVIG for next appointment.   Patient discharged in stable condition accompanied by: self.  Departure Mode: Ambulatory.    Estrellita Davis RN

## 2017-11-16 ENCOUNTER — THERAPY VISIT (OUTPATIENT)
Dept: PHYSICAL THERAPY | Facility: CLINIC | Age: 77
End: 2017-11-16
Payer: MEDICARE

## 2017-11-16 DIAGNOSIS — M54.41 RIGHT-SIDED LOW BACK PAIN WITH RIGHT-SIDED SCIATICA, UNSPECIFIED CHRONICITY: Primary | ICD-10-CM

## 2017-11-16 PROCEDURE — 97110 THERAPEUTIC EXERCISES: CPT | Mod: GP | Performed by: PHYSICAL THERAPIST

## 2017-11-16 PROCEDURE — 97112 NEUROMUSCULAR REEDUCATION: CPT | Mod: GP | Performed by: PHYSICAL THERAPIST

## 2017-11-16 PROCEDURE — G8978 MOBILITY CURRENT STATUS: HCPCS | Mod: GP

## 2017-11-16 PROCEDURE — G8979 MOBILITY GOAL STATUS: HCPCS | Mod: GP

## 2017-11-16 NOTE — PROGRESS NOTES
Subjective:    HPI Comments: Patient is skeptical as to if physical therapy will be able to help with pain. Concordant sign of turning to left in chair causes right inner thigh and outer hip pain. Sideglide to left also causes inner right thigh pain. Stiffness in lower back with extension.    Patient is a 77 year old female presenting with rehab back hpi. The history is provided by the patient. No  was used.   Tricia Sosa is a 77 year old female with a lumbar condition.  Condition occurred with:  Insidious onset.  Condition occurred: for unknown reasons.  This is a recurrent condition  Sought care on 10/9/17.    Patient reports pain:  Other (hip and inner thigh).  Radiates to:  Thigh right, gluteals right and gluteals left.  Pain is described as sharp and is intermittent Pain Scale: no pain.  Associated symptoms:  Loss of strength. Pain is the same all the time.  Symptoms are exacerbated by walking, standing, certain positions, bending, sitting and other (moving right leg into certain positions, especially turning L in a chair, ascending stairs is most problematic ) and relieved by nothing.  Since onset symptoms are unchanged.  Special tests:  X-ray.      General health as reported by patient is good.  Pertinent medical history includes:  Anemia and thyroid problems.  Medical allergies: no.  Other surgeries include:  Other.  Current medications:  Thyroid medication and other (antibiotic, vitamin B12, folic acid).  Current occupation is Retired  .        Barriers include:  Other (stairs at home).    Red flags:  Severe dizziness.                        Objective:    System    Physical Exam      Wally Lumbar Evaluation    Posture:  Sitting: fair  Standing: poor  Lordosis: Reduced  Lateral Shift: no      Movement Loss:  Flexion (Flex): min  Extension (EXT): major  Side Glide R (SG R): pain and min  Side [place] L (SG L): min and pain  Test Movements:        EIL: During: no effect  After: no  effect  Mechanical Response: IncROM  Repeat EIL: During: no effect  After: no effect  Mechanical Response: IncROM                                                 ROS    Assessment/Plan:      Patient is a 77 year old female with lumbar complaints.    Patient has the following significant findings with corresponding treatment plan.                Diagnosis 1:  Lumbar radiculopathy  Pain -  hot/cold therapy, manual therapy, self management, education, directional preference exercise and home program  Decreased ROM/flexibility - manual therapy, therapeutic exercise and home program  Impaired muscle performance - neuro re-education and home program  Decreased function - therapeutic activities and home program  Impaired posture - neuro re-education and home program    Therapy Evaluation Codes:   1) History comprised of:   Personal factors that impact the plan of care:      Age, Anxiety, Past/current experiences and Time since onset of symptoms.    Comorbidity factors that impact the plan of care are:      Dizziness.     Medications impacting care: None.  2) Examination of Body Systems comprised of:   Body structures and functions that impact the plan of care:      Hip, Knee and Lumbar spine.   Activity limitations that impact the plan of care are:      Bathing, Bending, Cooking, Driving, Dressing, Jumping, Reading/Computer work, Running, Sitting, Sports, Squatting/kneeling, Stairs, Standing and Walking.  3) Clinical presentation characteristics are:   Stable/Uncomplicated.  4) Decision-Making    Low complexity using standardized patient assessment instrument and/or measureable assessment of functional outcome.  Cumulative Therapy Evaluation is: Low complexity.    Previous and current functional limitations:  (See Goal Flow Sheet for this information)    Short term and Long term goals: (See Goal Flow Sheet for this information)     Communication ability:  Patient appears to be able to clearly communicate and understand  verbal and written communication and follow directions correctly.  Treatment Explanation - The following has been discussed with the patient:   RX ordered/plan of care  Anticipated outcomes  Possible risks and side effects  This patient would benefit from PT intervention to resume normal activities.   Rehab potential is good.    Frequency:  1 X week, once daily  Duration:  for 4 weeks tapering to 2 X month for 8 X week  Discharge Plan:  Achieve all LTG.  Independent in home treatment program.  Reach maximal therapeutic benefit.    Please refer to the daily flowsheet for treatment today, total treatment time and time spent performing 1:1 timed codes.

## 2017-11-16 NOTE — LETTER
DEPARTMENT OF HEALTH AND HUMAN SERVICES  CENTERS FOR MEDICARE & MEDICAID SERVICES    PLAN/UPDATED PLAN OF PROGRESS FOR OUTPATIENT REHABILITATION    PATIENTS NAME:  Tricia Sosa   : 1940  PROVIDER NUMBER:    [id number]  [id number]  PROVIDER NAME: [facility] [place] [facility]  MEDICAL RECORD NUMBER: [id number]   START OF CARE DATE:  SOC Date: 17   TYPE:  PT  PRIMARY/TREATMENT DIAGNOSIS:Right-sided low back pain with right-sided sciatica, unspecified chronicity  VISITS FROM START OF CARE:  Rxs Used: 1     Subjective:  HPI Comments: Patient is skeptical as to if physical therapy will be able to help with pain. Concordant sign of turning to left in chair causes right inner thigh and outer hip pain. Sideglide to left also causes inner right thigh pain. Stiffness in lower back with extension.  Patient is a 77 year old female presenting with rehab back hpi. The history is provided by the patient. No  was used.   Tricia Sosa is a 77 year old female with a lumbar condition.  Condition occurred with:  Insidious onset.  Condition occurred: for unknown reasons.  This is a recurrent condition  Sought care on 10/9/17.    Patient reports pain:  Other (hip and inner thigh).  Radiates to:  Thigh right, gluteals right and gluteals left.  Pain is described as sharp and is intermittent Pain Scale: no pain.  Associated symptoms:  Loss of strength. Pain is the same all the time.  Symptoms are exacerbated by walking, standing, certain positions, bending, sitting and other (moving right leg into certain positions, especially turning L in a chair, ascending stairs is most problematic ) and relieved by nothing.  Since onset symptoms are unchanged.  Special tests:  X-ray.      General health as reported by patient is good.  Pertinent medical history includes:  Anemia and thyroid problems.  Medical allergies: no.  Other surgeries include:  Other.  Current medications:  Thyroid  medication and other (antibiotic, vitamin B12, folic acid).  Current occupation is Retired. Barriers include:  Other (stairs at home).  Red flags:  Severe dizziness.    Objective:       Wally Lumbar Evaluation  Posture:  Sitting: fair  Standing: poor  Lordosis: Reduced  Lateral Shift: no  Movement Loss:  Flexion (Flex): min  Extension (EXT): major  Side Glide R (SG R): pain and min  Side [place] L (SG L): min and pain  Test Movements:  EIL: During: no effect  After: no effect  Mechanical Response: IncROM  Repeat EIL: During: no effect  After: no effect  Mechanical Response: IncROM    Assessment/Plan:    Patient is a 77 year old female with lumbar complaints.    Patient has the following significant findings with corresponding treatment plan.            PATIENTS NAME:  Tricia Sosa : 1940          Diagnosis 1:  Lumbar radiculopathy  Pain -  hot/cold therapy, manual therapy, self management, education, directional preference exercise and home program  Decreased ROM/flexibility - manual therapy, therapeutic exercise and home program  Impaired muscle performance - neuro re-education and home program  Decreased function - therapeutic activities and home program  Impaired posture - neuro re-education and home program  Therapy Evaluation Codes:   1) History comprised of:   Personal factors that impact the plan of care:      Age, Anxiety, Past/current experiences and Time since onset of symptoms.    Comorbidity factors that impact the plan of care are:      Dizziness.     Medications impacting care: None.  2) Examination of Body Systems comprised of:   Body structures and functions that impact the plan of care:      Hip, Knee and Lumbar spine.   Activity limitations that impact the plan of care are:      Bathing, Bending, Cooking, Driving, Dressing, Jumping, Reading/Computer work, Running, Sitting, Sports, Squatting/kneeling, Stairs, Standing and Walking.  3) Clinical presentation characteristics  "are:   Stable/Uncomplicated.  4) Decision-Making    Low complexity using standardized patient assessment instrument and/or measureable assessment of functional outcome.  Cumulative Therapy Evaluation is: Low complexity.  Previous and current functional limitations:  (See Goal Flow Sheet for this information)    Short term and Long term goals: (See Goal Flow Sheet for this information)   Communication ability:  Patient appears to be able to clearly communicate and understand verbal and written communication and follow directions correctly.  Treatment Explanation - The following has been discussed with the patient:   RX ordered/plan of care. Anticipated outcomes  Possible risks and side effects  This patient would benefit from PT intervention to resume normal activities.   Rehab potential is good.  Frequency:  1 X week, once daily  Duration:  for 4 weeks tapering to 2 X month for 8 X week  Discharge Plan:  Achieve all LTG.  Independent in home treatment program.  Reach maximal therapeutic benefit.    Caregiver Signature/Credentials _____________________________ Date ________       Treating Provider: KAY Hernandez     I have reviewed and certified the need for these services and plan of treatment while under my care.      PHYSICIAN'S SIGNATURE:   ______________________________________ Date___________             Monika BAY CNP    Certification period:  Beginning of Cert date period: 11/16/17 to  End of Cert period date: 02/13/18     Functional Level Progress Report: Please see attached \"Goal Flow sheet for Functional level.\"    ____X____ Continue Services or       ________ DC Services        Service dates: From  SOC Date: 11/16/17 date to present                         "

## 2017-11-16 NOTE — MR AVS SNAPSHOT
After Visit Summary   11/16/2017    Tricia Sosa    MRN: [id number]           Patient Information     Date Of Birth          1940        Visit Information        Provider Department      11/16/2017 10:00 AM Estela Rocha, PT [facility] [place] [facility]        Today's Diagnoses     Right-sided low back pain with right-sided sciatica, unspecified chronicity    -  1       Follow-ups after your visit        Your next 10 appointments already scheduled     Nov 27, 2017  2:20 PM CST   BRENDA Spine with Minal Arceo PT   [facility] [place] [facility] ([facility] [place])    [address]  [place] [address]   [phone]            Dec 12, 2017 11:00 AM CST   Level 4 with  INFUSION CHAIR 5   [facility] Infusion Services ([facility])    [facility]  [address]  [facility] [address]   [phone]            Dec 12, 2017 11:15 AM CST   Return Visit with Jose Summers MD   [facility] ([facility])    [facility]  [address]  [facility] [address]   [phone]              Who to contact     If you have questions or need follow up information about today's clinic visit or your schedule please contact [facility] directly at [phone].  Normal or non-critical lab and imaging results will be communicated to you by MyChart, letter or phone within 4 business days after the clinic has received the results. If you do not hear from us within 7 days, please contact the clinic through MyChart or phone. If you have a critical or abnormal lab result, we will notify you by phone as soon as possible.  Submit refill requests through [employer] or call your pharmacy and they will forward the refill request to us. Please allow 3 business days for your refill to be completed.          Additional  Information About Your Visit        MyChart Information     [email or web address] gives you secure access to your electronic health record. If you see a primary care provider, you can also send messages to your care team and make appointments. If you have questions, please call your primary care clinic.  If you do not have a primary care provider, please call [phone] and they will assist you.        Care EveryWhere ID     This is your Care EveryWhere ID. This could be used by other organizations to access your [facility] medical records  [id number]         Blood Pressure from Last 3 Encounters:   11/14/17 142/59   10/20/17 138/74   10/16/17 123/62    Weight from Last 3 Encounters:   10/20/17 68 kg (150 lb)   10/16/17 69.1 kg (152 lb 6.4 oz)   10/09/17 68.4 kg (150 lb 14.4 oz)              We Performed the Following     BRENDA CERT REPORT     BRENDA INITIAL EVAL REPORT     NEUROMUSCULAR RE-EDUCATION     THERAPEUTIC EXERCISES        Primary Care Provider Office Phone # Fax #    Monika SHANIQUE Jason [facility] [phone]       [address]  [facility] [address]        Equal Access to Services     [facility]: Hadii aad ku hadasho Soomaali, waaxda luqadaha, qaybta kaalmada adeegyada, waxay idiin hayaan ademert ackerman . So [facility] [phone].    ATENCIÓN: Si habla español, tiene a dial disposición servicios gratuitos de asistencia lingüística. Llame al [phone].    We comply with applicable federal civil rights laws and [place] laws. We do not discriminate on the basis of race, color, national origin, age, disability, sex, sexual orientation, or gender identity.            Thank you!     Thank you for choosing [facility]   for your care. Our goal is always to provide you with excellent care. Hearing back from our patients is one way we can continue to improve our services. Please take a few minutes to complete the written survey that you may receive in the mail after your  visit with us. Thank you!             Your Updated Medication List - Protect others around you: Learn how to safely use, store and throw away your medicines at www.disposemymeds.org.          This list is accurate as of: 11/16/17  3:35 PM.  Always use your most recent med list.                   Brand Name Dispense Instructions for use Diagnosis    cyanocobalamin 1000 MCG tablet    vitamin  B-12      Anemia, Back pain       Fluocinolone Acetonide Scalp 0.01 % Oil oil           folic acid 1 MG tablet    FOLVITE          ketoconazole 2 % shampoo    NIZORAL          levothyroxine 150 MCG tablet    SYNTHROID/LEVOTHROID    90 tablet    TAKE 1 TABLET BY MOUTH ONE TIME DAILY    Hypothyroidism, unspecified type       meclizine 25 MG tablet    ANTIVERT    30 tablet    Take 1 tablet (25 mg) by mouth every 6 hours as needed for dizziness    Vertigo       sulfamethoxazole-trimethoprim 800-160 MG per tablet    BACTRIM DS/SEPTRA DS

## 2017-11-27 ENCOUNTER — THERAPY VISIT (OUTPATIENT)
Dept: PHYSICAL THERAPY | Facility: CLINIC | Age: 77
End: 2017-11-27
Payer: MEDICARE

## 2017-11-27 DIAGNOSIS — M54.41 RIGHT-SIDED LOW BACK PAIN WITH RIGHT-SIDED SCIATICA, UNSPECIFIED CHRONICITY: ICD-10-CM

## 2017-11-27 PROCEDURE — 97112 NEUROMUSCULAR REEDUCATION: CPT | Mod: GP | Performed by: PHYSICAL THERAPIST

## 2017-11-27 PROCEDURE — 97110 THERAPEUTIC EXERCISES: CPT | Mod: GP | Performed by: PHYSICAL THERAPIST

## 2017-12-04 DIAGNOSIS — D64.9 ANEMIA: ICD-10-CM

## 2017-12-04 DIAGNOSIS — D59.19 OTHER AUTOIMMUNE HEMOLYTIC ANEMIAS: ICD-10-CM

## 2017-12-04 DIAGNOSIS — E53.8 B12 DEFICIENCY: ICD-10-CM

## 2017-12-04 DIAGNOSIS — D83.9 CVID (COMMON VARIABLE IMMUNODEFICIENCY) (H): ICD-10-CM

## 2017-12-04 LAB
BASOPHILS # BLD AUTO: 0 10E9/L (ref 0–0.2)
BASOPHILS NFR BLD AUTO: 0.4 %
DIFFERENTIAL METHOD BLD: NORMAL
EOSINOPHIL # BLD AUTO: 0.1 10E9/L (ref 0–0.7)
EOSINOPHIL NFR BLD AUTO: 1.1 %
ERYTHROCYTE [DISTWIDTH] IN BLOOD BY AUTOMATED COUNT: 13.4 % (ref 10–15)
HCT VFR BLD AUTO: 35 % (ref 35–47)
HGB BLD-MCNC: 11.8 G/DL (ref 11.7–15.7)
LDH SERPL L TO P-CCNC: 362 U/L (ref 81–234)
LYMPHOCYTES # BLD AUTO: 1 10E9/L (ref 0.8–5.3)
LYMPHOCYTES NFR BLD AUTO: 21.3 %
MCH RBC QN AUTO: 29.9 PG (ref 26.5–33)
MCHC RBC AUTO-ENTMCNC: 33.7 G/DL (ref 31.5–36.5)
MCV RBC AUTO: 89 FL (ref 78–100)
MONOCYTES # BLD AUTO: 0.7 10E9/L (ref 0–1.3)
MONOCYTES NFR BLD AUTO: 14.8 %
NEUTROPHILS # BLD AUTO: 2.8 10E9/L (ref 1.6–8.3)
NEUTROPHILS NFR BLD AUTO: 62.4 %
PLATELET # BLD AUTO: 166 10E9/L (ref 150–450)
RBC # BLD AUTO: 3.94 10E12/L (ref 3.8–5.2)
RETICS # AUTO: 52 10E9/L (ref 25–95)
RETICS/RBC NFR AUTO: 1.3 % (ref 0.5–2)
VIT B12 SERPL-MCNC: 825 PG/ML (ref 193–986)
WBC # BLD AUTO: 4.5 10E9/L (ref 4–11)

## 2017-12-04 PROCEDURE — 80053 COMPREHEN METABOLIC PANEL: CPT | Performed by: INTERNAL MEDICINE

## 2017-12-04 PROCEDURE — 83010 ASSAY OF HAPTOGLOBIN QUANT: CPT | Performed by: INTERNAL MEDICINE

## 2017-12-04 PROCEDURE — 82784 ASSAY IGA/IGD/IGG/IGM EACH: CPT | Performed by: INTERNAL MEDICINE

## 2017-12-04 PROCEDURE — 83540 ASSAY OF IRON: CPT | Performed by: INTERNAL MEDICINE

## 2017-12-04 PROCEDURE — 82728 ASSAY OF FERRITIN: CPT | Performed by: INTERNAL MEDICINE

## 2017-12-04 PROCEDURE — 83550 IRON BINDING TEST: CPT | Performed by: INTERNAL MEDICINE

## 2017-12-04 PROCEDURE — 36415 COLL VENOUS BLD VENIPUNCTURE: CPT | Performed by: INTERNAL MEDICINE

## 2017-12-04 PROCEDURE — 85045 AUTOMATED RETICULOCYTE COUNT: CPT | Performed by: INTERNAL MEDICINE

## 2017-12-04 PROCEDURE — 83615 LACTATE (LD) (LDH) ENZYME: CPT | Performed by: INTERNAL MEDICINE

## 2017-12-04 PROCEDURE — 82607 VITAMIN B-12: CPT | Performed by: INTERNAL MEDICINE

## 2017-12-04 PROCEDURE — 85025 COMPLETE CBC W/AUTO DIFF WBC: CPT | Performed by: INTERNAL MEDICINE

## 2017-12-05 LAB
ALBUMIN SERPL-MCNC: 3.9 G/DL (ref 3.4–5)
ALP SERPL-CCNC: 117 U/L (ref 40–150)
ALT SERPL W P-5'-P-CCNC: 35 U/L (ref 0–50)
ANION GAP SERPL CALCULATED.3IONS-SCNC: 4 MMOL/L (ref 3–14)
AST SERPL W P-5'-P-CCNC: 50 U/L (ref 0–45)
BILIRUB SERPL-MCNC: 0.7 MG/DL (ref 0.2–1.3)
BUN SERPL-MCNC: 12 MG/DL (ref 7–30)
CALCIUM SERPL-MCNC: 8.6 MG/DL (ref 8.5–10.1)
CHLORIDE SERPL-SCNC: 106 MMOL/L (ref 94–109)
CO2 SERPL-SCNC: 29 MMOL/L (ref 20–32)
CREAT SERPL-MCNC: 0.69 MG/DL (ref 0.52–1.04)
FERRITIN SERPL-MCNC: 13 NG/ML (ref 8–252)
GFR SERPL CREATININE-BSD FRML MDRD: 82 ML/MIN/1.7M2
GLUCOSE SERPL-MCNC: 83 MG/DL (ref 70–99)
HAPTOGLOB SERPL-MCNC: <6 MG/DL (ref 35–175)
IGG SERPL-MCNC: 985 MG/DL (ref 695–1620)
IGM SERPL-MCNC: 72 MG/DL (ref 60–265)
IRON SATN MFR SERPL: 15 % (ref 15–46)
IRON SERPL-MCNC: 59 UG/DL (ref 35–180)
POTASSIUM SERPL-SCNC: 4 MMOL/L (ref 3.4–5.3)
PROT SERPL-MCNC: 6.8 G/DL (ref 6.8–8.8)
SODIUM SERPL-SCNC: 139 MMOL/L (ref 133–144)
TIBC SERPL-MCNC: 398 UG/DL (ref 240–430)

## 2017-12-12 ENCOUNTER — INFUSION THERAPY VISIT (OUTPATIENT)
Dept: INFUSION THERAPY | Facility: CLINIC | Age: 77
End: 2017-12-12
Attending: INTERNAL MEDICINE
Payer: MEDICARE

## 2017-12-12 ENCOUNTER — ONCOLOGY VISIT (OUTPATIENT)
Dept: ONCOLOGY | Facility: CLINIC | Age: 77
End: 2017-12-12
Attending: INTERNAL MEDICINE
Payer: MEDICARE

## 2017-12-12 VITALS
RESPIRATION RATE: 16 BRPM | HEIGHT: 72 IN | BODY MASS INDEX: 21.4 KG/M2 | SYSTOLIC BLOOD PRESSURE: 123 MMHG | OXYGEN SATURATION: 100 % | WEIGHT: 158 LBS | DIASTOLIC BLOOD PRESSURE: 70 MMHG | HEART RATE: 71 BPM | TEMPERATURE: 97.2 F

## 2017-12-12 DIAGNOSIS — D83.9 CVID (COMMON VARIABLE IMMUNODEFICIENCY) (H): Primary | ICD-10-CM

## 2017-12-12 DIAGNOSIS — D59.19 OTHER AUTOIMMUNE HEMOLYTIC ANEMIAS: ICD-10-CM

## 2017-12-12 DIAGNOSIS — E53.8 B12 DEFICIENCY: ICD-10-CM

## 2017-12-12 PROCEDURE — 99213 OFFICE O/P EST LOW 20 MIN: CPT | Performed by: INTERNAL MEDICINE

## 2017-12-12 PROCEDURE — 96366 THER/PROPH/DIAG IV INF ADDON: CPT

## 2017-12-12 PROCEDURE — 96365 THER/PROPH/DIAG IV INF INIT: CPT

## 2017-12-12 PROCEDURE — 99211 OFF/OP EST MAY X REQ PHY/QHP: CPT | Mod: 25

## 2017-12-12 PROCEDURE — 25000128 H RX IP 250 OP 636: Performed by: INTERNAL MEDICINE

## 2017-12-12 PROCEDURE — 96375 TX/PRO/DX INJ NEW DRUG ADDON: CPT

## 2017-12-12 RX ORDER — SULFAMETHOXAZOLE/TRIMETHOPRIM 800-160 MG
TABLET ORAL
Qty: 45 TABLET | Refills: 3 | Status: SHIPPED | OUTPATIENT
Start: 2017-12-12 | End: 2018-11-13

## 2017-12-12 RX ORDER — DIPHENHYDRAMINE HCL 25 MG
25 CAPSULE ORAL
Status: CANCELLED | OUTPATIENT
Start: 2017-12-12

## 2017-12-12 RX ORDER — ACETAMINOPHEN 325 MG/1
650 TABLET ORAL
Status: CANCELLED
Start: 2017-12-12

## 2017-12-12 RX ADMIN — HUMAN IMMUNOGLOBULIN G 35 G: 20 LIQUID INTRAVENOUS at 12:38

## 2017-12-12 RX ADMIN — HYDROCORTISONE SODIUM SUCCINATE 100 MG: 100 INJECTION, POWDER, FOR SOLUTION INTRAMUSCULAR; INTRAVENOUS at 12:26

## 2017-12-12 NOTE — PATIENT INSTRUCTIONS
Follow up in 12 weeks with labs prior to visit to see me. Scheduled with Dr. Summers. Aura [place]      CBC w Diff, CMP and LDH; B12, retic, ferritin, iron panel, Ig subsets, haptoglobin-  Tricia has her labs drawn elsewhere. Aura [place]      Continue Ig  infusions every 4 weeks as   current  Scheduled  Aura [place]  AVS given to patient

## 2017-12-12 NOTE — MR AVS SNAPSHOT
After Visit Summary   12/12/2017    Tricia Sosa    MRN: [id number]           Patient Information     Date Of Birth          1940        Visit Information        Provider Department      12/12/2017 11:00 AM [facility] INFUSION CHAIR 5 [facility]        Today's Diagnoses     CVID (common variable immunodeficiency) (H)    -  1       Follow-ups after your visit        Your next 10 appointments already scheduled     Dec 28, 2017 10:40 AM CST   [facility] with Estela Rocha PT   [facility] [place] [facility] ([facility] [place])    [address]  [place] [address] [phone]              Who to contact     If you have questions or need follow up information about today's clinic visit or your schedule please contact [facility] directly at [phone].  Normal or non-critical lab and imaging results will be communicated to you by [employer]On[employer], letter or phone within 4 business days after the clinic has received the results. If you do not hear from us within 7 days, please contact the clinic through [employer]On[employer] or phone. If you have a critical or abnormal lab result, we will notify you by phone as soon as possible.  Submit refill requests through iOn[employer] or call your pharmacy and they will forward the refill request to us. Please allow 3 business days for your refill to be completed.          Additional Information About Your Visit        MyChart Information     iOn[employer] gives you secure access to your electronic health record. If you see a primary care provider, you can also send messages to your care team and make appointments. If you have questions, please call your primary care clinic.  If you do not have a primary care provider, please call [phone] and they will assist you.        Care EveryWhere ID     This is your Care EveryWhere ID. This could be used by other organizations to access your [facility]  records  [id number]         Blood Pressure from Last 3 Encounters:   12/12/17 123/70   11/14/17 142/59   10/20/17 138/74    Weight from Last 3 Encounters:   12/12/17 71.7 kg (158 lb)   10/20/17 68 kg (150 lb)   10/16/17 69.1 kg (152 lb 6.4 oz)              Today, you had the following     No orders found for display         Today's Medication Changes          These changes are accurate as of: 12/12/17  3:50 PM.  If you have any questions, ask your nurse or doctor.               These medicines have changed or have updated prescriptions.        Dose/Directions    sulfamethoxazole-trimethoprim 800-160 MG per tablet   Commonly known as:  BACTRIM DS/SEPTRA DS   This may have changed:  additional instructions   Used for:  CVID (common variable immunodeficiency) (H), Other autoimmune hemolytic anemias (H), B12 deficiency   Changed by:  Jose Summers MD        Take 1 pill orally on Mon, Wed and Friday   Quantity:  45 tablet   Refills:  3         Stop taking these medicines if you haven't already. Please contact your care team if you have questions.     meclizine 25 MG tablet   Commonly known as:  ANTIVERT   Stopped by:  Jose Summers MD                Where to get your medicines      These medications were sent to [facility] #[id number] [place] [address]     Phone:  [phone]     sulfamethoxazole-trimethoprim 800-160 MG per tablet                Primary Care Provider Office Phone # Fax #    Monika Eliot Whipple, APRN [facility] [phone]       [address] [facility] [address]        Equal Access to Services     [facility] MARTINEZ AH: Hadii carlin rios hadashpina Socarlos, waaxda luqadaha, qaybta kaalmada mariella, naya leon. So [facility] [phone].    ATENCIÓN: Si habla español, tiene a dial disposición servicios gratuitos de asistencia lingüística. Carlos Eduardo al [phone].    We comply with applicable federal civil rights  laws and [place] laws. We do not discriminate on the basis of race, color, national origin, age, disability, sex, sexual orientation, or gender identity.            Thank you!     Thank you for choosing [facility] INF[facility]ION [facility]  for your care. Our goal is always to provide you with excellent care. Hearing back from our patients is one way we can continue to improve our services. Please take a few minutes to complete the written survey that you may receive in the mail after your visit with us. Thank you!             Your Updated Medication List - Protect others around you: Learn how to safely use, store and throw away your medicines at www.disposemymeds.org.          This list is accurate as of: 12/12/17  3:50 PM.  Always use your most recent med list.                   Brand Name Dispense Instructions for use Diagnosis    cyanocobalamin 1000 MCG tablet    vitamin  B-12      Anemia, Back pain       Fluocinolone Acetonide Scalp 0.01 % Oil oil           folic acid 1 MG tablet    FOLVITE          ketoconazole 2 % shampoo    NIZORAL          levothyroxine 150 MCG tablet    SYNTHROID/LEVOTHROID    90 tablet    TAKE 1 TABLET BY MOUTH ONE TIME DAILY    Hypothyroidism, unspecified type       sulfamethoxazole-trimethoprim 800-160 MG per tablet    BACTRIM DS/SEPTRA DS    45 tablet    Take 1 pill orally on Mon, Wed and Friday    CVID (common variable immunodeficiency) (H), Other autoimmune hemolytic anemias (H), B12 deficiency

## 2017-12-12 NOTE — PROGRESS NOTES
"Infusion Nursing Note:  Maandrew Sosa presents today for IVIG.    Patient seen by provider today: Yes: Dr. Summers   present during visit today: Not Applicable.    Note: Assessment done by MD at [facility].  IVIG was initiated at 0.5mg/kg and increased by 1.0mg/kg every 15min to max of 4mg/kg. Denied SE from previous increased rate IVIG.    Intravenous Access:  Peripheral IV placed.    Treatment Conditions:  Rheumatology Infusion Checklist: PRIOR TO INFUSION OF BIOLOGICAL MEDICATIONS OR ANY OF THESE AS LISTED: Immune Globulin (IVIG) \".rheumbiologicalchecklist\"    Prior to Infusion of biological medications or any of these as listed:    1. Elevated temperature, fever, chills, productive cough or abnormal vital signs, night sweats, coughing up blood or sputum, no appetite or abnormal vital signs : NO    2. Open wounds or new incisions: NO    3. Recent hospitalization: NO    4.  Recent surgeries:  NO    5. Any upcoming surgeries or dental procedures?:NO    6. Any current or recent bouts of illness or infection? On any antibiotics? : NO    7. Any new, sudden or worsening abdominal pain :NO    8. Vaccination within 4 weeks? Patient or someone in the household is scheduled to receive vaccination? No live virus vaccines prior to or during treatment :NO    9. Any nervous system diseases [i.e. multiple sclerosis, Guillain-Marcus, seizures, neurological  changes]: NO    10. Pregnant or breast feeding; or plans on pregnancy in the future: NO    11. Signs of worsening depression or suicidal ideations while taking benlysta:NO    12. New-onset medical symptoms: NO    13.  New cancer diagnosis or on chemotherapy or radiation NO    14.  Evaluate for any sign of active TB [Unexplained weight loss, Loss of appetite, Night sweats, Fever, Fatigue, Chills, Coughing for 3 weeks or longer, Hemoptysis (coughing up blood), Chest pain]: NO    **Note: If answered yes to any of the above, hold the infusion and contact ordering " rheumatologist or on-call rheumatologist.   .        Post Infusion Assessment:  Patient tolerated infusion without incident.  Site patent and intact, free from redness, edema or discomfort.  No evidence of extravasations.  Access discontinued per protocol.    Discharge Plan:   Discharge instructions reviewed with: Patient.  Patient discharged in stable condition accompanied by: self.  Departure Mode: Ambulatory.    Will have scheduling call patient to schedule next appointment.    STACY CHEATHAM RN

## 2017-12-12 NOTE — LETTER
12/12/2017         RE: Tricia Sosa  [address] [place] [address]        Dear Colleague,    Thank you for referring your patient, Tricia Sosa, to the [facility]. Please see a copy of my visit note below.    [facility]  FOLLOW-UP VISIT NOTE    PATIENT NAME: Tricia Sosa MRN # [id number]  DATE OF VISIT: Dec 12, 2017 YOB: 1940    REFERRING PROVIDER: No referring provider defined for this encounter.    DIAGNOSIS: Hemolytic anemia-autoimmune; IgA deficiency    TREATMENT SUMMARY:  Tricia has been diagnosed with IgA deficiency since her around 2000.  She was very sick at the time and had severe diarrhea.  She lost about 40 pounds in weight.  The workup revealed that she had markedly diminished CD4 counts of 48 and was diagnosed with CMV colitis.  Since then she has been followed in hematology clinic at [place] until recently.  She was noted to have anemia which was fairly long-standing.  She was initially referred for anemia in 2004 and also had a bone marrow biopsy done at that time which revealed hypercellular bone marrow with 70-80% cellularity and normal trilineage hematopoiesis and no morphologic features of MDS or lymphoproliferation.  Her anemia was attributed to her chronic underlying disease.  At the next evaluation in 2002 on 4 aggressive anemia there was no evidence of hemolysis, iron deficiency, B12 or folate deficiency.  Bone marrow biopsy was not pursued.  In summer of 2015 she had progressive fatigue, dyspnea on exertion and worsening anemia with a hemoglobin now of 9.  Workup at this time did suggest a hemolytic anemia with elevated LDH at 709, undetectable haptoglobin and elevated unconjugated bilirubin at 3.3.  Monospecific MARIKA was positive for both IgG and complement.  Cold agglutinin screen was positive with very low titer 1:64.  She was started on prednisone 60 mg daily with supplementation of iron folate  "and B12 starting 7/31/15.  Her prednisone has been slowly tapered and was discontinued off in January 2016.  She was last followed at [facility] on March 10 when she had stable hemoglobin.      SUBJECTIVE   Tricia comes alone for this clinic visit for her hemolytic anemia.    Tricia is doing well overall. She has no new complains. She is here with labs done prior to clinic visit.       PAST MEDICAL HISTORY   1. Common variable immunodeficiency syndrome  2. 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 Selective IgA deficiency  4. Leukopenia with markedly low CD4 counts on Bactrim prophylaxis  5. History of fall with subdural hematoma in 6/11/14 with complete resolution  6. Hashimoto's disease status post partial thyroidectomy      CURRENT OUTPATIENT MEDICATIONS     Current Outpatient Prescriptions   Medication Sig     sulfamethoxazole-trimethoprim (BACTRIM DS/SEPTRA DS) 800-160 MG per tablet Take 1 pill orally on Mon, Wed and Friday     FLUOCINOLONE ACETONIDE SCALP 0.01 % OIL oil      levothyroxine (SYNTHROID/LEVOTHROID) 150 MCG tablet TAKE 1 TABLET BY MOUTH ONE TIME DAILY      ketoconazole (NIZORAL) 2 % shampoo      cyanocobalamin (VITAMIN  B-12) 1000 MCG tablet      folic acid (FOLVITE) 1 MG tablet      No current facility-administered medications for this visit.         ALLERGIES     Allergies   Allergen Reactions     Doxycycline         REVIEW OF SYSTEMS   As above in the HPI, o/w complete 12-point ROS was negative.     PHYSICAL EXAM   /70  Pulse 71  Temp 97.2  F (36.2  C) (Tympanic)  Resp 16  Ht 1.829 m (6' 0.01\")  Wt 71.7 kg (158 lb)  SpO2 100%  BMI 21.42 kg/m2    SpO2 Readings from Last 4 Encounters:   12/12/17 100%   10/16/17 97%   10/09/17 96%   08/22/17 96%     Wt Readings from Last 3 Encounters:   12/12/17 71.7 kg (158 lb)   10/20/17 68 kg (150 lb)   10/16/17 69.1 kg (152 lb 6.4 oz)     GEN: NAD  HEENT: PERRL, EOMI, no icterus, injection or pallor. " Oropharynx is clear.  NECK: no cervical or supraclavicular lymphadenopathy  LUNGS: clear bilaterally  CV: regular, no murmurs, rubs, or gallops  ABDOMEN: soft, non-tender, non-distended, normal bowel sounds, no hepatosplenomegaly by percussion or palpation  EXT: warm, well perfused, no edema  NEURO: alert  SKIN: no rashes     LABORATORY AND IMAGING STUDIES     Recent Labs   Lab Test  12/04/17   1454  10/20/17   1039  09/12/17   0828  06/19/17   0952  05/01/17   1022   NA  139  137  140  143  141   POTASSIUM  4.0  4.4  4.1  4.0  4.0   CHLORIDE  106  104  105  107  107   CO2  29  29  25  28  26   ANIONGAP  4  4  10  8  8   BUN  12  12  15  9  14   CR  0.69  0.61  0.72  0.74  0.67   GLC  83  73  110*  89  82   CULLEN  8.6  8.6  8.6  8.6  8.6     No results for input(s): MAG, PHOS in the last 12916 hours.  Recent Labs   Lab Test  12/04/17   1454  10/20/17   1039  09/12/17   0828  06/19/17   0952  05/01/17   1022   WBC  4.5  4.8  3.8*  3.4*  4.6   HGB  11.8  12.4  11.4*  12.0  10.2*   PLT  166  155  144*  157  161   MCV  89  89  88  89  96   NEUTROPHIL  62.4  64.5  62.0  64.3  70.0     Recent Labs   Lab Test  12/04/17   1454  09/12/17   0828  06/19/17   0952   BILITOTAL  0.7  0.9  0.8   ALKPHOS  117  101  95   ALT  35  36  35   AST  50*  50*  49*   ALBUMIN  3.9  3.8  3.9   LDH  362*  340*  394*     TSH   Date Value Ref Range Status   10/20/2017 1.90 0.40 - 4.00 mU/L Final   06/19/2017 1.82 0.40 - 4.00 mU/L Final   04/03/2017 6.29 (H) 0.40 - 4.00 mU/L Final        ASSESSMENT AND PLAN   1. 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 Common variable immunodeficiency disorder  4. Splenomegaly    Mavis is doing well and has no new complains. I reviewed all her labs with her. They have been stable at this visit. For the most important one - her Hgb has remained stable at 11.8 g/dl this time. She continues to have no Ig A and low Ig  G/Ig M. She continues to hemolyze and has undetectable haptoglobin, elevated bilirubin 0.7 improved from 1.4 and 2 in recent past, elevated LDH at 362 improved from 394, 764 and 977 previously.      She had a fair response to Ig infusions previously and it had helped her Hgb counts too. We would continue to do her infusions here.     She had question on Shingarix vaccine for shingles. I have checked and it is not a live vaccine. She might not mount any good response with her CVID. We will try to arrange this vaccine for her at her next visit.     I will see her in 12 weeks with labs.      Jose Summers  Adj ,  Division of Hematology, Oncology & Transplantation  [facility].        Again, thank you for allowing me to participate in the care of your patient.        Sincerely,        Jose Summers MD

## 2017-12-12 NOTE — NURSING NOTE
"Oncology Rooming Note    December 12, 2017 11:06 AM   Tricia Sosa is a 77 year old female who presents for:    Chief Complaint   Patient presents with     Oncology Clinic Visit     Follow up      Initial Vitals: /70  Pulse 71  Temp 97.2  F (36.2  C) (Tympanic)  Resp 16  Ht 1.829 m (6' 0.01\")  Wt 71.7 kg (158 lb)  SpO2 100%  BMI 21.42 kg/m2 Estimated body mass index is 21.42 kg/(m^2) as calculated from the following:    Height as of this encounter: 1.829 m (6' 0.01\").    Weight as of this encounter: 71.7 kg (158 lb). Body surface area is 1.91 meters squared.  Data Unavailable Comment: Data Unavailable   No LMP recorded. Patient is postmenopausal.  Allergies reviewed: Yes  Medications reviewed: Yes    Medications: MEDICATION REFILLS NEEDED TODAY. Provider was notified. Bactrim  Pharmacy name entered into [facility]:    [id number] - [place], [place] - [address] [place] [address] [facility]    Clinical concerns: Follow up    8 minutes for nursing intake (face to face time)     Erica Negron CMA     DISCHARGE PLAN:  Next appointments: See patient instruction section  Departure Mode: Ambulatory  Accompanied by: self  0 minutes for nursing discharge (face to face time)   Erica Negron CMA                "

## 2017-12-14 NOTE — PROGRESS NOTES
[facility] CANCER CLINIC  FOLLOW-UP VISIT NOTE    PATIENT NAME: Tricia Sosa MRN # [id number]  DATE OF VISIT: Dec 12, 2017 YOB: 1940    REFERRING PROVIDER: No referring provider defined for this encounter.    DIAGNOSIS: Hemolytic anemia-autoimmune; IgA deficiency    TREATMENT SUMMARY:  Tricia has been diagnosed with IgA deficiency since her around 2000.  She was very sick at the time and had severe diarrhea.  She lost about 40 pounds in weight.  The workup revealed that she had markedly diminished CD4 counts of 48 and was diagnosed with CMV colitis.  Since then she has been followed in hematology clinic at [place] until recently.  She was noted to have anemia which was fairly long-standing.  She was initially referred for anemia in 2004 and also had a bone marrow biopsy done at that time which revealed hypercellular bone marrow with 70-80% cellularity and normal trilineage hematopoiesis and no morphologic features of MDS or lymphoproliferation.  Her anemia was attributed to her chronic underlying disease.  At the next evaluation in 2002 on 4 aggressive anemia there was no evidence of hemolysis, iron deficiency, B12 or folate deficiency.  Bone marrow biopsy was not pursued.  In summer of 2015 she had progressive fatigue, dyspnea on exertion and worsening anemia with a hemoglobin now of 9.  Workup at this time did suggest a hemolytic anemia with elevated LDH at 709, undetectable haptoglobin and elevated unconjugated bilirubin at 3.3.  Monospecific MARIKA was positive for both IgG and complement.  Cold agglutinin screen was positive with very low titer 1:64.  She was started on prednisone 60 mg daily with supplementation of iron folate and B12 starting 7/31/15.  Her prednisone has been slowly tapered and was discontinued off in January 2016.  She was last followed at [facility] on March 10 when she had stable hemoglobin.      SUBJECTIVE   Tricia comes alone for this clinic visit for  "her hemolytic anemia.    Tricia is doing well overall. She has no new complains. She is here with labs done prior to clinic visit.       PAST MEDICAL HISTORY   1. Common variable immunodeficiency syndrome  2. 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 Selective IgA deficiency  4. Leukopenia with markedly low CD4 counts on Bactrim prophylaxis  5. History of fall with subdural hematoma in 6/11/14 with complete resolution  6. Hashimoto's disease status post partial thyroidectomy      CURRENT OUTPATIENT MEDICATIONS     Current Outpatient Prescriptions   Medication Sig     sulfamethoxazole-trimethoprim (BACTRIM DS/SEPTRA DS) 800-160 MG per tablet Take 1 pill orally on Mon, Wed and Friday     FLUOCINOLONE ACETONIDE SCALP 0.01 % OIL oil      levothyroxine (SYNTHROID/LEVOTHROID) 150 MCG tablet TAKE 1 TABLET BY MOUTH ONE TIME DAILY      ketoconazole (NIZORAL) 2 % shampoo      cyanocobalamin (VITAMIN  B-12) 1000 MCG tablet      folic acid (FOLVITE) 1 MG tablet      No current facility-administered medications for this visit.         ALLERGIES     Allergies   Allergen Reactions     Doxycycline         REVIEW OF SYSTEMS   As above in the HPI, o/w complete 12-point ROS was negative.     PHYSICAL EXAM   /70  Pulse 71  Temp 97.2  F (36.2  C) (Tympanic)  Resp 16  Ht 1.829 m (6' 0.01\")  Wt 71.7 kg (158 lb)  SpO2 100%  BMI 21.42 kg/m2    SpO2 Readings from Last 4 Encounters:   12/12/17 100%   10/16/17 97%   10/09/17 96%   08/22/17 96%     Wt Readings from Last 3 Encounters:   12/12/17 71.7 kg (158 lb)   10/20/17 68 kg (150 lb)   10/16/17 69.1 kg (152 lb 6.4 oz)     GEN: NAD  HEENT: PERRL, EOMI, no icterus, injection or pallor. Oropharynx is clear.  NECK: no cervical or supraclavicular lymphadenopathy  LUNGS: clear bilaterally  CV: regular, no murmurs, rubs, or gallops  ABDOMEN: soft, non-tender, non-distended, normal bowel sounds, no hepatosplenomegaly by percussion or " palpation  EXT: warm, well perfused, no edema  NEURO: alert  SKIN: no rashes     LABORATORY AND IMAGING STUDIES     Recent Labs   Lab Test  12/04/17   1454  10/20/17   1039  09/12/17   0828  06/19/17   0952  05/01/17   1022   NA  139  137  140  143  141   POTASSIUM  4.0  4.4  4.1  4.0  4.0   CHLORIDE  106  104  105  107  107   CO2  29  29  25  28  26   ANIONGAP  4  4  10  8  8   BUN  12  12  15  9  14   CR  0.69  0.61  0.72  0.74  0.67   GLC  83  73  110*  89  82   CULLEN  8.6  8.6  8.6  8.6  8.6     No results for input(s): MAG, PHOS in the last 27617 hours.  Recent Labs   Lab Test  12/04/17   1454  10/20/17   1039  09/12/17   0828  06/19/17   0952  05/01/17   1022   WBC  4.5  4.8  3.8*  3.4*  4.6   HGB  11.8  12.4  11.4*  12.0  10.2*   PLT  166  155  144*  157  161   MCV  89  89  88  89  96   NEUTROPHIL  62.4  64.5  62.0  64.3  70.0     Recent Labs   Lab Test  12/04/17   1454  09/12/17   0828  06/19/17   0952   BILITOTAL  0.7  0.9  0.8   ALKPHOS  117  101  95   ALT  35  36  35   AST  50*  50*  49*   ALBUMIN  3.9  3.8  3.9   LDH  362*  340*  394*     TSH   Date Value Ref Range Status   10/20/2017 1.90 0.40 - 4.00 mU/L Final   06/19/2017 1.82 0.40 - 4.00 mU/L Final   04/03/2017 6.29 (H) 0.40 - 4.00 mU/L Final        ASSESSMENT AND PLAN   1. 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 Common variable immunodeficiency disorder  4. Splenomegaly    Tricia is doing well and has no new complains. I reviewed all her labs with her. They have been stable at this visit. For the most important one - her Hgb has remained stable at 11.8 g/dl this time. She continues to have no Ig A and low Ig G/Ig M. She continues to hemolyze and has undetectable haptoglobin, elevated bilirubin 0.7 improved from 1.4 and 2 in recent past, elevated LDH at 362 improved from 394, 764 and 977 previously.      She had a fair response to Ig infusions previously  and it had helped her Hgb counts too. We would continue to do her infusions here.     She had question on Shingarix vaccine for shingles. I have checked and it is not a live vaccine. She might not mount any good response with her CVID. We will try to arrange this vaccine for her at her next visit.     I will see her in 12 weeks with labs.      Jose Spain ,  Division of Hematology, Oncology & Transplantation  [facility].

## 2017-12-28 ENCOUNTER — THERAPY VISIT (OUTPATIENT)
Dept: PHYSICAL THERAPY | Facility: CLINIC | Age: 77
End: 2017-12-28
Payer: MEDICARE

## 2017-12-28 DIAGNOSIS — M54.41 RIGHT-SIDED LOW BACK PAIN WITH RIGHT-SIDED SCIATICA, UNSPECIFIED CHRONICITY: ICD-10-CM

## 2017-12-28 PROCEDURE — 97112 NEUROMUSCULAR REEDUCATION: CPT | Mod: GP | Performed by: PHYSICAL THERAPIST

## 2017-12-28 PROCEDURE — 97110 THERAPEUTIC EXERCISES: CPT | Mod: GP | Performed by: PHYSICAL THERAPIST

## 2018-01-09 ENCOUNTER — INFUSION THERAPY VISIT (OUTPATIENT)
Dept: INFUSION THERAPY | Facility: CLINIC | Age: 78
End: 2018-01-09
Attending: INTERNAL MEDICINE
Payer: MEDICARE

## 2018-01-09 VITALS
HEART RATE: 70 BPM | DIASTOLIC BLOOD PRESSURE: 63 MMHG | BODY MASS INDEX: 21.56 KG/M2 | TEMPERATURE: 98.1 F | WEIGHT: 159 LBS | SYSTOLIC BLOOD PRESSURE: 129 MMHG

## 2018-01-09 DIAGNOSIS — D83.9 CVID (COMMON VARIABLE IMMUNODEFICIENCY) (H): Primary | ICD-10-CM

## 2018-01-09 PROCEDURE — 96366 THER/PROPH/DIAG IV INF ADDON: CPT

## 2018-01-09 PROCEDURE — 96365 THER/PROPH/DIAG IV INF INIT: CPT

## 2018-01-09 PROCEDURE — 25000128 H RX IP 250 OP 636: Performed by: INTERNAL MEDICINE

## 2018-01-09 PROCEDURE — 96375 TX/PRO/DX INJ NEW DRUG ADDON: CPT

## 2018-01-09 RX ORDER — ACETAMINOPHEN 325 MG/1
650 TABLET ORAL
Status: CANCELLED
Start: 2018-01-09

## 2018-01-09 RX ORDER — DIPHENHYDRAMINE HCL 25 MG
25 CAPSULE ORAL
Status: CANCELLED | OUTPATIENT
Start: 2018-01-09

## 2018-01-09 RX ADMIN — HUMAN IMMUNOGLOBULIN G 35 G: 20 LIQUID INTRAVENOUS at 11:32

## 2018-01-09 RX ADMIN — HYDROCORTISONE SODIUM SUCCINATE 100 MG: 100 INJECTION, POWDER, FOR SOLUTION INTRAMUSCULAR; INTRAVENOUS at 11:27

## 2018-01-09 NOTE — MR AVS SNAPSHOT
After Visit Summary   1/9/2018    Tricia Sosa    MRN: [id number]           Patient Information     Date Of Birth          1940        Visit Information        Provider Department      1/9/2018 10:30 AM RH INFUSION CHAIR 6 [facility]        Today's Diagnoses     CVID (common variable immunodeficiency) (H)    -  1       Follow-ups after your visit        Your next 10 appointments already scheduled     Jan 18, 2018 10:40 AM CST   BRENDA Spine with Estela Rocha PT   [facility] [place] [facility] ([facility] [place])    [address]  [place] [address]   [phone]            Feb 06, 2018 10:00 AM CST   Level 5 with RH INFUSION CHAIR 2   [facility] ([facility])    [facility] [place] [address]  [facility] [address]   [phone]            Mar 06, 2018  9:45 AM CST   Lab with Infusion with RH LAB DRAW 1   [facility] ([facility])    [facility]  [address]  [facility] [address]   [phone]            Mar 06, 2018 10:15 AM CST   Return Visit with Jose Summers MD   [facility] ([facility])    [facility]  [address]  [facility] [address]   [phone]            Mar 06, 2018 11:00 AM CST   Level 5 with RH INFUSION CHAIR 4   [facility] ([facility])    [facility]  [address]  [place] [address]   [phone]            Apr 03, 2018 10:00 AM CDT   Level 5 with RH INFUSION CHAIR 6   [facility] ([facility])    [facility]  [address]  [facility] [address]   [phone]            May 01, 2018 10:00 AM CDT   Level 5  with  INFUSION CHAIR 1   [facility] ([facility])    [facility]  [address] [place] [address]  [facility] [address] [phone]              Who to contact     If you have questions or need follow up information about today's clinic visit or your schedule please contact [facility] directly at [phone].  Normal or non-critical lab and imaging results will be communicated to you by [employer], letter or phone within 4 business days after the clinic has received the results. If you do not hear from us within 7 days, please contact the clinic through [employer] or phone. If you have a critical or abnormal lab result, we will notify you by phone as soon as possible.  Submit refill requests through [employer] or call your pharmacy and they will forward the refill request to us. Please allow 3 business days for your refill to be completed.          Additional Information About Your Visit        [employer] Information     [employer] gives you secure access to your electronic health record. If you see a primary care provider, you can also send messages to your care team and make appointments. If you have questions, please call your primary care clinic.  If you do not have a primary care provider, please call [phone] and they will assist you.        Care EveryWhere ID     This is your Care EveryWhere ID. This could be used by other organizations to access your [place] medical records  [id number]        Your Vitals Were     Pulse Temperature BMI (Body Mass Index)             70 98.1  F (36.7  C) (Tympanic) 21.56 kg/m2          Blood Pressure from Last 3 Encounters:   01/09/18 129/63   12/12/17 123/70   11/14/17 142/59    Weight from Last 3 Encounters:   01/09/18 72.1 kg (159 lb)   12/12/17 71.7 kg (158 lb)   10/20/17 68 kg (150 lb)              Today, you had the following     No orders found for display       Primary  Care Provider Office Phone # Fax #    SHANIQUE Walsh Ra, [phone]       [address] [address] [address]        Equal Access to Services     ABBYELVIN MARTINEZ : Hadii aad ku hadharmano Soomaali, waaxda luqadaha, qaybta kaalmada adeegyada, naya pitts laYusracarol leon. So [facility] [phone].    ATENCIÓN: Si habla español, tiene a dial disposición servicios gratuitos de asistencia lingüística. Llame al [phone].    We comply with applicable federal civil rights laws and [place] laws. We do not discriminate on the basis of race, color, national origin, age, disability, sex, sexual orientation, or gender identity.            Thank you!     Thank you for choosing [facility] INF[facility]ION [facility]  for your care. Our goal is always to provide you with excellent care. Hearing back from our patients is one way we can continue to improve our services. Please take a few minutes to complete the written survey that you may receive in the mail after your visit with us. Thank you!             Your Updated Medication List - Protect others around you: Learn how to safely use, store and throw away your medicines at www.disposemymeds.org.          This list is accurate as of: 1/9/18  2:41 PM.  Always use your most recent med list.                   Brand Name Dispense Instructions for use Diagnosis    cyanocobalamin 1000 MCG tablet    vitamin  B-12      Anemia, Back pain       Fluocinolone Acetonide Scalp 0.01 % Oil oil           folic acid 1 MG tablet    FOLVITE          ketoconazole 2 % shampoo    NIZORAL          levothyroxine 150 MCG tablet    SYNTHROID/LEVOTHROID    90 tablet    TAKE 1 TABLET BY MOUTH ONE TIME DAILY    Hypothyroidism, unspecified type       sulfamethoxazole-trimethoprim 800-160 MG per tablet    BACTRIM DS/SEPTRA DS    45 tablet    Take 1 pill orally on Mon, Wed and Friday    CVID (common variable immunodeficiency) (H), Other autoimmune hemolytic anemias (H),  B12 deficiency

## 2018-01-09 NOTE — PROGRESS NOTES
"Infusion Nursing Note:  Tricia Sosa presents today for IVIG w/ solucortef premed.    Patient seen by provider today: No   present during visit today: Not Applicable.    Note:Tricia did have cold last month but just clear drainage now. IVIG was initiated at 0.5 ml/kg/hr, increased by 1ml/kg /hr to max of 4 ml/kg/hr.    Intravenous Access:  Peripheral IV placed.    Treatment Conditions:  Rheumatology Infusion Checklist: PRIOR TO INFUSION OF BIOLOGICAL MEDICATIONS OR ANY OF THESE AS LISTED: Immune Globulin (IVIG) \".rheumbiologicalchecklist\"    Prior to Infusion of biological medications or any of these as listed:    1. Elevated temperature, fever, chills, productive cough or abnormal vital signs, night sweats, coughing up blood or sputum, no appetite or abnormal vital signs : NO    2. Open wounds or new incisions: NO    3. Recent hospitalization: NO    4.  Recent surgeries:  NO    5. Any upcoming surgeries or dental procedures?:NO    6. Any current or recent bouts of illness or infection? On any antibiotics? : NO    7. Any new, sudden or worsening abdominal pain :NO    8. Vaccination within 4 weeks? Patient or someone in the household is scheduled to receive vaccination? No live virus vaccines prior to or during treatment :NO    9. Any nervous system diseases [i.e. multiple sclerosis, Guillain-Schoolcraft, seizures, neurological  changes]: NO    10. Pregnant or breast feeding; or plans on pregnancy in the future: NO    11. Signs of worsening depression or suicidal ideations while taking benlysta:NO    12. New-onset medical symptoms: NO    13.  New cancer diagnosis or on chemotherapy or radiation NO    14.  Evaluate for any sign of active TB [Unexplained weight loss, Loss of appetite, Night sweats, Fever, Fatigue, Chills, Coughing for 3 weeks or longer, Hemoptysis (coughing up blood), Chest pain]: NO    Post Infusion Assessment:  Patient tolerated infusion without incident.  Blood return noted pre and post " infusion.  Site patent and intact, free from redness, edema or discomfort.  Access discontinued per protocol.  Rheumatology Post Infusion: POST-INFUSION OF BIOLOGICAL MEDICATION:    Reviewed with patient.  Given biologic medication or medication hand-out. Inform patient if any fever, chills or signs of infection, new symptoms, abdominal pain, heart palpitations, shortness of breath, reaction, weakness, neurological changes, seek medical attention immediately and should not receive infusions. No live virus vaccines prior to or during treatment or up to 6 months post infusion. If the patient has an upcoming procedure or surgery, this should be discussed with the rheumatologist and surgeon or provider..      Discharge Plan:   Discharge instructions reviewed with: Patient.  Patient and/or family verbalized understanding of discharge instructions and all questions answered.  Copy of AVS reviewed with patient and/or family.  Patient will return 2/6/18 for next appointment.  Patient discharged in stable condition accompanied by: self.  Departure Mode: Ambulatory.    Ewa High RN

## 2018-01-23 PROBLEM — M54.2 CERVICALGIA: Status: RESOLVED | Noted: 2017-03-07 | Resolved: 2018-01-23

## 2018-01-25 ENCOUNTER — THERAPY VISIT (OUTPATIENT)
Dept: PHYSICAL THERAPY | Facility: CLINIC | Age: 78
End: 2018-01-25
Payer: MEDICARE

## 2018-01-25 DIAGNOSIS — M54.41 RIGHT-SIDED LOW BACK PAIN WITH RIGHT-SIDED SCIATICA, UNSPECIFIED CHRONICITY: ICD-10-CM

## 2018-01-25 PROCEDURE — G8978 MOBILITY CURRENT STATUS: HCPCS | Mod: GP | Performed by: PHYSICAL THERAPIST

## 2018-01-25 PROCEDURE — 97112 NEUROMUSCULAR REEDUCATION: CPT | Mod: GP | Performed by: PHYSICAL THERAPIST

## 2018-01-25 PROCEDURE — G8979 MOBILITY GOAL STATUS: HCPCS | Mod: GP | Performed by: PHYSICAL THERAPIST

## 2018-01-25 PROCEDURE — 97110 THERAPEUTIC EXERCISES: CPT | Mod: GP | Performed by: PHYSICAL THERAPIST

## 2018-01-25 NOTE — LETTER
DEPARTMENT OF HEALTH AND HUMAN SERVICES  CENTERS FOR MEDICARE & MEDICAID SERVICES    PLAN/UPDATED PLAN OF PROGRESS FOR OUTPATIENT REHABILITATION    PATIENTS NAME:  Tricia Sosa   : 1940  PROVIDER NUMBER:    [id number]  [id number]  PROVIDER NAME: [facility]  MEDICAL RECORD NUMBER: [id number]   START OF CARE DATE:  SOC Date: 17   TYPE:  PT    PRIMARY/TREATMENT DIAGNOSIS: (Pertinent Medical Diagnosis)  Right-sided low back pain with right-sided sciatica, unspecified chronicity    VISITS FROM START OF CARE:  Rxs Used: 4     PROGRESS  REPORT  Progress reporting period is from 17 to 18.     SUBJECTIVE  Subjective changes noted by patient:  not really sure how she is feeling, reports that she gets fatigued easily, still gets soreness in back after she does her vacuuming and needs to stretch to feel better after, reports that walking across a long parking lot or walking through a store is difficult, feels like she cannot support her body, does not do formal exercise of any sort, reports that she is unable to do any prolonged walking (<1 block) before knees start to hurt; reports that she is not able to get up out of a chair w/out maximal use of B UE    Current pain level is NA  .     Previous pain level was  4/10  .   Changes in function:  Yes (See Goal flowsheet attached for changes in current functional level)  Adverse reaction to treatment or activity: None  OBJECTIVE  Changes noted in objective findings:  Yes,   Objective: lumbar AROM is WNL, slight R groin pain w/ L SG; hip ER 4/5, IR 4+/5, hip flex 4/5, abd 4-/5, ext 4-/5   ASSESSMENT/PLAN  Updated problem list and treatment plan: Diagnosis 1:  LBP  Pain -  manual therapy, education, directional preference exercise and home program  Decreased ROM/flexibility - manual therapy, therapeutic exercise and home program  Decreased strength - therapeutic exercise, therapeutic activities and home  "program  Impaired muscle performance - neuro re-education and home program  Decreased function - therapeutic activities and home program  STG/LTGs have been met or progress has been made towards goals:  Yes (See Goal flow sheet completed today.)  Assessment of Progress: The patient's condition has potential to improve.  The patient's progress has slowed.  Self Management Plans:  Patient has been instructed in a home treatment program.  Patient  has been instructed in self management of symptoms.  I have re-evaluated this patient and find that the nature, scope, duration and intensity of the therapy is appropriate for the medical condition of the patient.  Tricia continues to require the following intervention to meet STG and LTG's:  PT    PATIENTS NAME:  Tricia Sosa   : 1940    Recommendations:  This patient would benefit from continued therapy.     Frequency:  2 X a month, once daily  Duration:  for 3 months    Caregiver Signature/Credentials _____________________________ Date ________       Treating Provider: KAYLI Rocha PT     I have reviewed and certified the need for these services and plan of treatment while under my care.        PHYSICIAN'S SIGNATURE:   _____________________________________  Date___________                       SHANIQUE Walsh Ra CNP    Certification period:  Beginning of Cert date period: 18 to  End of Cert period date: 18     Functional Level Progress Report: Please see attached \"Goal Flow sheet for Functional level.\"    ____X____ Continue Services or       ________ DC Services                Service dates: From  SOC Date: 17 date to present                         "

## 2018-01-25 NOTE — MR AVS SNAPSHOT
After Visit Summary   1/25/2018    Tricia Sosa    MRN: [id number]           Patient Information     Date Of Birth          1940        Visit Information        Provider Department      1/25/2018 10:40 AM Estela Rocha, PT [facility]        Today's Diagnoses     Right-sided low back pain with right-sided sciatica, unspecified chronicity           Follow-ups after your visit        Your next 10 appointments already scheduled     Feb 06, 2018 10:00 AM CST   Level 5 with RH INFUSION CHAIR 2   [facility] ([facility])    [facility]  [address]  [place] [address]   [phone]            Feb 22, 2018 11:20 AM CST   BRENDA Spine with Estela Rocha PT   [facility] [place] [facility] ([facility] [place])    [address]  [place] [address]   [phone]            Mar 06, 2018  9:45 AM CST   Lab with Infusion with RH LAB DRAW 1   [facility] ([facility])    [facility]  [address]  [place] [address]   [phone]            Mar 06, 2018 10:15 AM CST   Return Visit with Jose Summers MD   [facility] ([facility])    [facility]  [address]  [place] [address]   [phone]            Mar 06, 2018 11:00 AM CST   Level 5 with RH INFUSION CHAIR 4   [facility] ([facility])    [facility]  [address]  [place] [address]   [phone]            Apr 03, 2018 10:00 AM CDT   Level 5 with RH INFUSION CHAIR 6   [facility] ([facility])    [facility]  [address]  [place] [address]   [phone]            May 01,  2018 10:00 AM CDT   Level 5 with RH INFUSION CHAIR 1   [facility]fusion [facility] ([facility])    [facility]  [address] [place]  [address]  [facility] [address] [phone]              Who to contact     If you have questions or need follow up information about today's clinic visit or your schedule please contact [facility] directly at [phone].  Normal or non-critical lab and imaging results will be communicated to you by [employer], letter or phone within 4 business days after the clinic has received the results. If you do not hear from us within 7 days, please contact the clinic through [employer] or phone. If you have a critical or abnormal lab result, we will notify you by phone as soon as possible.  Submit refill requests through [employer] or call your pharmacy and they will forward the refill request to us. Please allow 3 business days for your refill to be completed.          Additional Information About Your Visit        [employer] Information     [employer] gives you secure access to your electronic health record. If you see a primary care provider, you can also send messages to your care team and make appointments. If you have questions, please call your primary care clinic.  If you do not have a primary care provider, please call [phone] and they will assist you.        Care EveryWhere ID     This is your Care EveryWhere ID. This could be used by other organizations to access your [place] medical records  [id number]         Blood Pressure from Last 3 Encounters:   01/09/18 129/63   12/12/17 123/70   11/14/17 142/59    Weight from Last 3 Encounters:   01/09/18 72.1 kg (159 lb)   12/12/17 71.7 kg (158 lb)   10/20/17 68 kg (150 lb)              We Performed the Following     NEUROMUSCULAR RE-EDUCATION     THERAPEUTIC EXERCISES        Primary Care Provider Office Phone # Fax #    SHANIQUE Walsh Ra, CNP  [phone]       [address]  [facility] [address]        Equal Access to Services     ZIA HENRIQUEZ : Hadii carlin rios bette Serna, walesada luregla, andrewta kamaurilioda mariella, naya leon. [facility] [phone].    ATENCIÓN: Si habla español, tiene a dial disposición servicios gratuitos de asistencia lingüística. Llame al [phone].    We comply with applicable federal civil rights laws and [place] laws. We do not discriminate on the basis of race, color, national origin, age, disability, sex, sexual orientation, or gender identity.            Thank you!     Thank you for choosing [place] [facility]  for your care. Our goal is always to provide you with excellent care. Hearing back from our patients is one way we can continue to improve our services. Please take a few minutes to complete the written survey that you may receive in the mail after your visit with us. Thank you!             Your Updated Medication List - Protect others around you: Learn how to safely use, store and throw away your medicines at www.disposemymeds.org.          This list is accurate as of 1/25/18  2:49 PM.  Always use your most recent med list.                   Brand Name Dispense Instructions for use Diagnosis    cyanocobalamin 1000 MCG tablet    vitamin  B-12      Anemia, Back pain       Fluocinolone Acetonide Scalp 0.01 % Oil oil           folic acid 1 MG tablet    FOLVITE          ketoconazole 2 % shampoo    NIZORAL          levothyroxine 150 MCG tablet    SYNTHROID/LEVOTHROID    90 tablet    TAKE 1 TABLET BY MOUTH ONE TIME DAILY    Hypothyroidism, unspecified type       sulfamethoxazole-trimethoprim 800-160 MG per tablet    BACTRIM DS/SEPTRA DS    45 tablet    Take 1 pill orally on Mon, Wed and Friday    CVID (common variable immunodeficiency) (H), Other autoimmune hemolytic anemias (H), B12 deficiency

## 2018-02-06 ENCOUNTER — INFUSION THERAPY VISIT (OUTPATIENT)
Dept: INFUSION THERAPY | Facility: CLINIC | Age: 78
End: 2018-02-06
Attending: INTERNAL MEDICINE
Payer: MEDICARE

## 2018-02-06 VITALS
TEMPERATURE: 97.2 F | DIASTOLIC BLOOD PRESSURE: 74 MMHG | HEART RATE: 77 BPM | BODY MASS INDEX: 21.71 KG/M2 | RESPIRATION RATE: 16 BRPM | WEIGHT: 160.1 LBS | SYSTOLIC BLOOD PRESSURE: 145 MMHG

## 2018-02-06 DIAGNOSIS — D83.9 CVID (COMMON VARIABLE IMMUNODEFICIENCY) (H): Primary | ICD-10-CM

## 2018-02-06 PROCEDURE — 96366 THER/PROPH/DIAG IV INF ADDON: CPT

## 2018-02-06 PROCEDURE — 25000128 H RX IP 250 OP 636: Performed by: INTERNAL MEDICINE

## 2018-02-06 PROCEDURE — 96365 THER/PROPH/DIAG IV INF INIT: CPT

## 2018-02-06 PROCEDURE — 96375 TX/PRO/DX INJ NEW DRUG ADDON: CPT

## 2018-02-06 RX ORDER — ACETAMINOPHEN 325 MG/1
650 TABLET ORAL
Status: CANCELLED
Start: 2018-02-06

## 2018-02-06 RX ORDER — DIPHENHYDRAMINE HCL 25 MG
25 CAPSULE ORAL
Status: CANCELLED | OUTPATIENT
Start: 2018-02-06

## 2018-02-06 RX ADMIN — HYDROCORTISONE SODIUM SUCCINATE 100 MG: 100 INJECTION, POWDER, FOR SOLUTION INTRAMUSCULAR; INTRAVENOUS at 10:18

## 2018-02-06 RX ADMIN — HUMAN IMMUNOGLOBULIN G 35 G: 20 LIQUID INTRAVENOUS at 10:29

## 2018-02-06 NOTE — MR AVS SNAPSHOT
After Visit Summary   2/6/2018    Tricia Sosa    MRN: [id number]           Patient Information     Date Of Birth          1940        Visit Information        Provider Department      2/6/2018 10:00 AM RH INFUSION CHAIR 2 [facility]        Today's Diagnoses     CVID (common variable immunodeficiency) (H)    -  1       Follow-ups after your visit        Your next 10 appointments already scheduled     Feb 22, 2018 11:20 AM CST   BRENDA Spine with Estela Rocha PT   [facility] [place] [facility] ([facility] [place])    [address]  [place] [address]   [phone]            Mar 06, 2018  9:45 AM CST   Lab with Infusion with RH LAB DRAW 1   [facility] ([facility])    [place] [address]  [facility] [address]   [phone]            Mar 06, 2018 10:15 AM CST   Return Visit with Jose Summers MD   [facility] ([facility])    [facility] [place] [address]  [facility] [address]   [phone]            Mar 06, 2018 11:00 AM CST   Level 5 with RH INFUSION CHAIR 4   [facility] ([facility])    [facility]  [address]  [facility] [address]   [phone]            Apr 03, 2018 10:00 AM CDT   Level 5 with RH INFUSION CHAIR 6   [facility] ([facility])    [facility]  [address]  [place] [address]   [phone]            May 01, 2018 10:00 AM CDT   Level 5 with RH INFUSION CHAIR 1   [facility] ([facility])    [facility]  [address]  [place] [address]   [phone]              Who to contact     If you have  questions or need follow up information about today's clinic visit or your schedule please contact [facility] directly at [phone].  Normal or non-critical lab and imaging results will be communicated to you by MyChart, letter or phone within 4 business days after the clinic has received the results. If you do not hear from us within 7 days, please contact the clinic through [employer] or phone. If you have a critical or abnormal lab result, we will notify you by phone as soon as possible.  Submit refill requests through [employer] or call your pharmacy and they will forward the refill request to us. Please allow 3 business days for your refill to be completed.          Additional Information About Your Visit        [employer] Information     [employer] gives you secure access to your electronic health record. If you see a primary care provider, you can also send messages to your care team and make appointments. If you have questions, please call your primary care clinic.  If you do not have a primary care provider, please call [phone] and they will assist you.        Care EveryWhere ID     This is your Care EveryWhere ID. This could be used by other organizations to access your [place] medical records  [id number]        Your Vitals Were     Pulse Temperature Respirations BMI (Body Mass Index)          77 97.2  F (36.2  C) (Tympanic) 16 21.71 kg/m2         Blood Pressure from Last 3 Encounters:   02/06/18 145/74   01/09/18 129/63   12/12/17 123/70    Weight from Last 3 Encounters:   02/06/18 72.6 kg (160 lb 1.6 oz)   01/09/18 72.1 kg (159 lb)   12/12/17 71.7 kg (158 lb)              Today, you had the following     No orders found for display       Primary Care Provider Office Phone # Fax #    SHANIQUE Walsh [facility] [phone]       [address] [facility] [address]        Equal Access to Services     ZIA HENRIQUEZ AH: andry Mccollum  deepak larissa jeanethbarbara keeshanaya hassan ah. So [facility] [place] [phone].    ATENCIÓN: Si jr salas, tiene a dial disposición servicios gratuitos de asistencia lingüística. Carlos Eduardo al [phone].    We comply with applicable federal civil rights laws and [place] laws. We do not discriminate on the basis of race, color, national origin, age, disability, sex, sexual orientation, or gender identity.            Thank you!     Thank you for choosing [facility]  for your care. Our goal is always to provide you with excellent care. Hearing back from our patients is one way we can continue to improve our services. Please take a few minutes to complete the written survey that you may receive in the mail after your visit with us. Thank you!             Your Updated Medication List - Protect others around you: Learn how to safely use, store and throw away your medicines at www.disposemymeds.org.          This list is accurate as of 2/6/18  2:57 PM.  Always use your most recent med list.                   Brand Name Dispense Instructions for use Diagnosis    cyanocobalamin 1000 MCG tablet    vitamin  B-12      Anemia, Back pain       Fluocinolone Acetonide Scalp 0.01 % Oil oil           folic acid 1 MG tablet    FOLVITE          ketoconazole 2 % shampoo    NIZORAL          levothyroxine 150 MCG tablet    SYNTHROID/LEVOTHROID    90 tablet    TAKE 1 TABLET BY MOUTH ONE TIME DAILY    Hypothyroidism, unspecified type       sulfamethoxazole-trimethoprim 800-160 MG per tablet    BACTRIM DS/SEPTRA DS    45 tablet    Take 1 pill orally on Mon, Wed and Friday    CVID (common variable immunodeficiency) (H), Other autoimmune hemolytic anemias (H), B12 deficiency

## 2018-02-06 NOTE — PROGRESS NOTES
"Infusion Nursing Note:  Tricia Sosa presents today for IVIG and premeds.    Patient seen by provider today: No   present during visit today: Not Applicable.    Note: Pt states labs will be done before MD apt Labs.Not needed today.    Intravenous Access:  Peripheral IV placed.    Treatment Conditions:  Rheumatology Infusion Checklist: PRIOR TO INFUSION OF BIOLOGICAL MEDICATIONS OR ANY OF THESE AS LISTED: Immune Globulin (IVIG) \".rheumbiologicalchecklist\"    Prior to Infusion of biological medications or any of these as listed:    1. Elevated temperature, fever, chills, productive cough or abnormal vital signs, night sweats, coughing up blood or sputum, no appetite or abnormal vital signs : NO    2. Open wounds or new incisions: NO    3. Recent hospitalization: NO    4.  Recent surgeries:  NO    5. Any upcoming surgeries or dental procedures?:NO    6. Any current or recent bouts of illness or infection? On any antibiotics? : NO    7. Any new, sudden or worsening abdominal pain :NO    8. Vaccination within 4 weeks? Patient or someone in the household is scheduled to receive vaccination? No live virus vaccines prior to or during treatment :NO    9. Any nervous system diseases [i.e. multiple sclerosis, Guillain-[place], seizures, neurological  changes]: NO    10. Pregnant or breast feeding; or plans on pregnancy in the future: NO    11. Signs of worsening depression or suicidal ideations while taking benlysta:NO    12. New-onset medical symptoms: NO    13.  New cancer diagnosis or on chemotherapy or radiation NO    14.  Evaluate for any sign of active TB [Unexplained weight loss, Loss of appetite, Night sweats, Fever, Fatigue, Chills, Coughing for 3 weeks or longer, Hemoptysis (coughing up blood), Chest pain]: NO    Patient here for IVIG     Privigen 35g     IVIG started at 35 cc/hr. Increased by 72cc/hr every 15 minutes for max rate of 290 cc/hr.      Post Infusion Assessment:  Patient tolerated infusion " without incident.  Blood return noted pre and post infusion.  Site patent and intact, free from redness, edema or discomfort.  Access discontinued per protocol.  Rheumatology Post Infusion: POST-INFUSION OF BIOLOGICAL MEDICATION:    Reviewed with patient.  Given biologic medication or medication hand-out. Inform patient if any fever, chills or signs of infection, new symptoms, abdominal pain, heart palpitations, shortness of breath, reaction, weakness, neurological changes, seek medical attention immediately and should not receive infusions. No live virus vaccines prior to or during treatment or up to 6 months post infusion. If the patient has an upcoming procedure or surgery, this should be discussed with the rheumatologist and surgeon or provider..    Discharge Plan:   Discharge instructions reviewed with: Patient.  Patient and/or family verbalized understanding of discharge instructions and all questions answered.  Copy of AVS reviewed with patient and/or family.  Patient will return 3/6 for next appointment.  Patient discharged in stable condition accompanied by: self.  Departure Mode: Ambulatory.    Ewa High RN

## 2018-02-22 ENCOUNTER — THERAPY VISIT (OUTPATIENT)
Dept: PHYSICAL THERAPY | Facility: CLINIC | Age: 78
End: 2018-02-22
Payer: MEDICARE

## 2018-02-22 DIAGNOSIS — M54.41 RIGHT-SIDED LOW BACK PAIN WITH RIGHT-SIDED SCIATICA, UNSPECIFIED CHRONICITY: ICD-10-CM

## 2018-02-22 PROCEDURE — 97110 THERAPEUTIC EXERCISES: CPT | Mod: GP | Performed by: PHYSICAL THERAPIST

## 2018-02-22 PROCEDURE — 97112 NEUROMUSCULAR REEDUCATION: CPT | Mod: GP | Performed by: PHYSICAL THERAPIST

## 2018-02-22 NOTE — PROGRESS NOTES
Subjective:  HPI                    Objective:  System    Physical Exam    General     ROS    Assessment/Plan:    PROGRESS  REPORT    Progress reporting period is from 11/16/17 to 1/25/18.       SUBJECTIVE  Subjective changes noted by patient:  not really sure how she is feeling, reports that she gets fatigued easily, still gets soreness in back after she does her vacuuming and needs to stretch to feel better after, reports that walking across a long parking lot or walking through a store is difficult, feels like she cannot support her body, does not do formal exercise of any sort, reports that she is unable to do any prolonged walking (<1 block) before knees start to hurt; reports that she is not able to get up out of a chair w/out maximal use of B UE    Current pain level is NA  .     Previous pain level was  4/10  .   Changes in function:  Yes (See Goal flowsheet attached for changes in current functional level)  Adverse reaction to treatment or activity: None    OBJECTIVE  Changes noted in objective findings:  Yes,   Objective: lumbar AROM is WNL, slight R groin pain w/ L SG; hip ER 4/5, IR 4+/5, hip flex 4/5, abd 4-/5, ext 4-/5     ASSESSMENT/PLAN  Updated problem list and treatment plan: Diagnosis 1:  LBP  Pain -  manual therapy, education, directional preference exercise and home program  Decreased ROM/flexibility - manual therapy, therapeutic exercise and home program  Decreased strength - therapeutic exercise, therapeutic activities and home program  Impaired muscle performance - neuro re-education and home program  Decreased function - therapeutic activities and home program  STG/LTGs have been met or progress has been made towards goals:  Yes (See Goal flow sheet completed today.)  Assessment of Progress: The patient's condition has potential to improve.  The patient's progress has slowed.  Self Management Plans:  Patient has been instructed in a home treatment program.  Patient  has been instructed in  self management of symptoms.  I have re-evaluated this patient and find that the nature, scope, duration and intensity of the therapy is appropriate for the medical condition of the patient.  Mavis continues to require the following intervention to meet STG and LTG's:  PT    Recommendations:  This patient would benefit from continued therapy.     Frequency:  2 X a month, once daily  Duration:  for 3 months        Please refer to the daily flowsheet for treatment today, total treatment time and time spent performing 1:1 timed codes.

## 2018-02-27 DIAGNOSIS — E53.8 B12 DEFICIENCY: ICD-10-CM

## 2018-02-27 DIAGNOSIS — D59.19 OTHER AUTOIMMUNE HEMOLYTIC ANEMIAS: ICD-10-CM

## 2018-02-27 DIAGNOSIS — D64.9 ANEMIA: ICD-10-CM

## 2018-02-27 DIAGNOSIS — D83.9 CVID (COMMON VARIABLE IMMUNODEFICIENCY) (H): ICD-10-CM

## 2018-02-27 LAB
BASOPHILS # BLD AUTO: 0 10E9/L (ref 0–0.2)
BASOPHILS NFR BLD AUTO: 0.4 %
DIFFERENTIAL METHOD BLD: NORMAL
EOSINOPHIL # BLD AUTO: 0.1 10E9/L (ref 0–0.7)
EOSINOPHIL NFR BLD AUTO: 2.6 %
ERYTHROCYTE [DISTWIDTH] IN BLOOD BY AUTOMATED COUNT: 13.6 % (ref 10–15)
HCT VFR BLD AUTO: 37.1 % (ref 35–47)
HGB BLD-MCNC: 12.4 G/DL (ref 11.7–15.7)
LDH SERPL L TO P-CCNC: 305 U/L (ref 81–234)
LYMPHOCYTES # BLD AUTO: 0.9 10E9/L (ref 0.8–5.3)
LYMPHOCYTES NFR BLD AUTO: 20 %
MCH RBC QN AUTO: 29.8 PG (ref 26.5–33)
MCHC RBC AUTO-ENTMCNC: 33.4 G/DL (ref 31.5–36.5)
MCV RBC AUTO: 89 FL (ref 78–100)
MONOCYTES # BLD AUTO: 0.7 10E9/L (ref 0–1.3)
MONOCYTES NFR BLD AUTO: 15.8 %
NEUTROPHILS # BLD AUTO: 2.9 10E9/L (ref 1.6–8.3)
NEUTROPHILS NFR BLD AUTO: 61.2 %
PLATELET # BLD AUTO: 154 10E9/L (ref 150–450)
RBC # BLD AUTO: 4.16 10E12/L (ref 3.8–5.2)
RETICS # AUTO: 59.9 10E9/L (ref 25–95)
RETICS/RBC NFR AUTO: 1.5 % (ref 0.5–2)
VIT B12 SERPL-MCNC: 687 PG/ML (ref 193–986)
WBC # BLD AUTO: 4.7 10E9/L (ref 4–11)

## 2018-02-27 PROCEDURE — 36415 COLL VENOUS BLD VENIPUNCTURE: CPT | Performed by: INTERNAL MEDICINE

## 2018-02-27 PROCEDURE — 80053 COMPREHEN METABOLIC PANEL: CPT | Performed by: INTERNAL MEDICINE

## 2018-02-27 PROCEDURE — 83550 IRON BINDING TEST: CPT | Performed by: INTERNAL MEDICINE

## 2018-02-27 PROCEDURE — 83010 ASSAY OF HAPTOGLOBIN QUANT: CPT | Performed by: INTERNAL MEDICINE

## 2018-02-27 PROCEDURE — 82607 VITAMIN B-12: CPT | Performed by: INTERNAL MEDICINE

## 2018-02-27 PROCEDURE — 82728 ASSAY OF FERRITIN: CPT | Performed by: INTERNAL MEDICINE

## 2018-02-27 PROCEDURE — 82784 ASSAY IGA/IGD/IGG/IGM EACH: CPT | Performed by: INTERNAL MEDICINE

## 2018-02-27 PROCEDURE — 83615 LACTATE (LD) (LDH) ENZYME: CPT | Performed by: INTERNAL MEDICINE

## 2018-02-27 PROCEDURE — 85045 AUTOMATED RETICULOCYTE COUNT: CPT | Performed by: INTERNAL MEDICINE

## 2018-02-27 PROCEDURE — 83540 ASSAY OF IRON: CPT | Performed by: INTERNAL MEDICINE

## 2018-02-27 PROCEDURE — 85025 COMPLETE CBC W/AUTO DIFF WBC: CPT | Performed by: INTERNAL MEDICINE

## 2018-02-28 LAB
ALBUMIN SERPL-MCNC: 4.1 G/DL (ref 3.4–5)
ALP SERPL-CCNC: 114 U/L (ref 40–150)
ALT SERPL W P-5'-P-CCNC: 25 U/L (ref 0–50)
ANION GAP SERPL CALCULATED.3IONS-SCNC: 4 MMOL/L (ref 3–14)
AST SERPL W P-5'-P-CCNC: 46 U/L (ref 0–45)
BILIRUB SERPL-MCNC: 0.9 MG/DL (ref 0.2–1.3)
BUN SERPL-MCNC: 14 MG/DL (ref 7–30)
CALCIUM SERPL-MCNC: 8.5 MG/DL (ref 8.5–10.1)
CHLORIDE SERPL-SCNC: 105 MMOL/L (ref 94–109)
CO2 SERPL-SCNC: 29 MMOL/L (ref 20–32)
CREAT SERPL-MCNC: 0.81 MG/DL (ref 0.52–1.04)
FERRITIN SERPL-MCNC: 14 NG/ML (ref 8–252)
GFR SERPL CREATININE-BSD FRML MDRD: 69 ML/MIN/1.7M2
GLUCOSE SERPL-MCNC: 82 MG/DL (ref 70–99)
IRON SATN MFR SERPL: 18 % (ref 15–46)
IRON SERPL-MCNC: 74 UG/DL (ref 35–180)
POTASSIUM SERPL-SCNC: 4 MMOL/L (ref 3.4–5.3)
PROT SERPL-MCNC: 6.8 G/DL (ref 6.8–8.8)
SODIUM SERPL-SCNC: 138 MMOL/L (ref 133–144)
TIBC SERPL-MCNC: 401 UG/DL (ref 240–430)

## 2018-03-04 LAB
HAPTOGLOB SERPL-MCNC: <6 MG/DL (ref 35–175)
IGG SERPL-MCNC: 878 MG/DL (ref 695–1620)
IGM SERPL-MCNC: 78 MG/DL (ref 60–265)

## 2018-03-06 ENCOUNTER — ONCOLOGY VISIT (OUTPATIENT)
Dept: ONCOLOGY | Facility: CLINIC | Age: 78
End: 2018-03-06
Attending: INTERNAL MEDICINE
Payer: MEDICARE

## 2018-03-06 ENCOUNTER — INFUSION THERAPY VISIT (OUTPATIENT)
Dept: INFUSION THERAPY | Facility: CLINIC | Age: 78
End: 2018-03-06
Attending: INTERNAL MEDICINE
Payer: MEDICARE

## 2018-03-06 VITALS
HEART RATE: 78 BPM | OXYGEN SATURATION: 98 % | DIASTOLIC BLOOD PRESSURE: 65 MMHG | BODY MASS INDEX: 21.48 KG/M2 | TEMPERATURE: 97.2 F | WEIGHT: 158.4 LBS | SYSTOLIC BLOOD PRESSURE: 116 MMHG

## 2018-03-06 DIAGNOSIS — E03.8 OTHER SPECIFIED HYPOTHYROIDISM: ICD-10-CM

## 2018-03-06 DIAGNOSIS — E53.8 B12 DEFICIENCY: ICD-10-CM

## 2018-03-06 DIAGNOSIS — D83.9 CVID (COMMON VARIABLE IMMUNODEFICIENCY) (H): Primary | ICD-10-CM

## 2018-03-06 DIAGNOSIS — D59.19 OTHER AUTOIMMUNE HEMOLYTIC ANEMIAS: ICD-10-CM

## 2018-03-06 PROCEDURE — 96365 THER/PROPH/DIAG IV INF INIT: CPT

## 2018-03-06 PROCEDURE — G0463 HOSPITAL OUTPT CLINIC VISIT: HCPCS | Mod: 25

## 2018-03-06 PROCEDURE — 25000128 H RX IP 250 OP 636: Performed by: INTERNAL MEDICINE

## 2018-03-06 PROCEDURE — 96375 TX/PRO/DX INJ NEW DRUG ADDON: CPT

## 2018-03-06 PROCEDURE — 99214 OFFICE O/P EST MOD 30 MIN: CPT | Performed by: INTERNAL MEDICINE

## 2018-03-06 PROCEDURE — 96366 THER/PROPH/DIAG IV INF ADDON: CPT

## 2018-03-06 RX ORDER — ACETAMINOPHEN 325 MG/1
650 TABLET ORAL
Status: CANCELLED
Start: 2018-03-06

## 2018-03-06 RX ORDER — DIPHENHYDRAMINE HCL 25 MG
25 CAPSULE ORAL
Status: CANCELLED | OUTPATIENT
Start: 2018-03-06

## 2018-03-06 RX ADMIN — HUMAN IMMUNOGLOBULIN G 35 G: 20 LIQUID INTRAVENOUS at 10:38

## 2018-03-06 RX ADMIN — HYDROCORTISONE SODIUM SUCCINATE 100 MG: 100 INJECTION, POWDER, FOR SOLUTION INTRAMUSCULAR; INTRAVENOUS at 10:33

## 2018-03-06 ASSESSMENT — PAIN SCALES - GENERAL: PAINLEVEL: NO PAIN (1)

## 2018-03-06 NOTE — PROGRESS NOTES
[facility]  FOLLOW-UP VISIT NOTE    PATIENT NAME: Tricia Sosa MRN # [id number]  DATE OF VISIT: Mar 6, 2018 YOB: 1940    REFERRING PROVIDER: No referring provider defined for this encounter.    DIAGNOSIS: Hemolytic anemia-autoimmune; IgA deficiency    TREATMENT SUMMARY:  Tricia has been diagnosed with IgA deficiency since her around 2000.  She was very sick at the time and had severe diarrhea.  She lost about 40 pounds in weight.  The workup revealed that she had markedly diminished CD4 counts of 48 and was diagnosed with CMV colitis.  Since then she has been followed in hematology clinic at [place] until recently.  She was noted to have anemia which was fairly long-standing.  She was initially referred for anemia in 2004 and also had a bone marrow biopsy done at that time which revealed hypercellular bone marrow with 70-80% cellularity and normal trilineage hematopoiesis and no morphologic features of MDS or lymphoproliferation.  Her anemia was attributed to her chronic underlying disease.  At the next evaluation in 2002 on 4 aggressive anemia there was no evidence of hemolysis, iron deficiency, B12 or folate deficiency.  Bone marrow biopsy was not pursued.  In summer of 2015 she had progressive fatigue, dyspnea on exertion and worsening anemia with a hemoglobin now of 9.  Workup at this time did suggest a hemolytic anemia with elevated LDH at 709, undetectable haptoglobin and elevated unconjugated bilirubin at 3.3.  Monospecific MARIKA was positive for both IgG and complement.  Cold agglutinin screen was positive with very low titer 1:64.  She was started on prednisone 60 mg daily with supplementation of iron folate and B12 starting 7/31/15.  Her prednisone has been slowly tapered and was discontinued off in January 2016.  She was last followed at [facility] on March 10 when she had stable hemoglobin.      SUBJECTIVE   Tricia comes alone for this clinic visit for  her hemolytic anemia.    Tricia is doing well overall. She has no new complains. She is here with labs done prior to clinic visit.       PAST MEDICAL HISTORY   1. Common variable immunodeficiency syndrome  2. 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 Selective IgA deficiency  4. Leukopenia with markedly low CD4 counts on Bactrim prophylaxis  5. History of fall with subdural hematoma in 6/11/14 with complete resolution  6. Hashimoto's disease status post partial thyroidectomy      CURRENT OUTPATIENT MEDICATIONS     Current Outpatient Prescriptions   Medication Sig     sulfamethoxazole-trimethoprim (BACTRIM DS/SEPTRA DS) 800-160 MG per tablet Take 1 pill orally on Mon, Wed and Friday     FLUOCINOLONE ACETONIDE SCALP 0.01 % OIL oil      levothyroxine (SYNTHROID/LEVOTHROID) 150 MCG tablet TAKE 1 TABLET BY MOUTH ONE TIME DAILY      ketoconazole (NIZORAL) 2 % shampoo      cyanocobalamin (VITAMIN  B-12) 1000 MCG tablet      folic acid (FOLVITE) 1 MG tablet      No current facility-administered medications for this visit.         ALLERGIES     Allergies   Allergen Reactions     Doxycycline         REVIEW OF SYSTEMS   As above in the HPI, o/w complete 12-point ROS was negative.     PHYSICAL EXAM   /65  Pulse 78  Temp 97.2  F (36.2  C) (Tympanic)  Wt 71.8 kg (158 lb 6.4 oz)  SpO2 98%  BMI 21.48 kg/m2    SpO2 Readings from Last 4 Encounters:   12/12/17 100%   10/16/17 97%   10/09/17 96%   08/22/17 96%     Wt Readings from Last 3 Encounters:   02/06/18 72.6 kg (160 lb 1.6 oz)   01/09/18 72.1 kg (159 lb)   12/12/17 71.7 kg (158 lb)     GEN: NAD  HEENT: PERRL, EOMI, no icterus, injection or pallor. Oropharynx is clear.  NECK: no cervical or supraclavicular lymphadenopathy  LUNGS: clear bilaterally  CV: regular, no murmurs, rubs, or gallops  ABDOMEN: soft, non-tender, non-distended, normal bowel sounds, no hepatosplenomegaly by percussion or palpation  EXT: warm, well  perfused, no edema  NEURO: alert  SKIN: no rashes     LABORATORY AND IMAGING STUDIES     Recent Labs   Lab Test  02/27/18   0931  12/04/17   1454  10/20/17   1039  09/12/17   0828  06/19/17   0952   NA  138  139  137  140  143   POTASSIUM  4.0  4.0  4.4  4.1  4.0   CHLORIDE  105  106  104  105  107   CO2  29  29  29  25  28   ANIONGAP  4  4  4  10  8   BUN  14  12  12  15  9   CR  0.81  0.69  0.61  0.72  0.74   GLC  82  83  73  110*  89   CULLEN  8.5  8.6  8.6  8.6  8.6     No results for input(s): MAG, PHOS in the last 38822 hours.  Recent Labs   Lab Test  02/27/18   0931  12/04/17   1454  10/20/17   1039  09/12/17   0828  06/19/17   0952   WBC  4.7  4.5  4.8  3.8*  3.4*   HGB  12.4  11.8  12.4  11.4*  12.0   PLT  154  166  155  144*  157   MCV  89  89  89  88  89   NEUTROPHIL  61.2  62.4  64.5  62.0  64.3     Recent Labs   Lab Test  02/27/18   0931  12/04/17   1454  09/12/17   0828   BILITOTAL  0.9  0.7  0.9   ALKPHOS  114  117  101   ALT  25  35  36   AST  46*  50*  50*   ALBUMIN  4.1  3.9  3.8   LDH  305*  362*  340*     TSH   Date Value Ref Range Status   10/20/2017 1.90 0.40 - 4.00 mU/L Final   06/19/2017 1.82 0.40 - 4.00 mU/L Final   04/03/2017 6.29 (H) 0.40 - 4.00 mU/L Final     Recent Labs   Lab Test  02/27/18   0931  12/04/17   1454  10/20/17   1039  09/12/17   0828  06/19/17   0952  05/01/17   1022   04/14/15   1116   B12  687  825   --   636  712  722   < >  277   FOLIC   --    --    --    --    --    --    --   10.7   HGB  12.4  11.8  12.4  11.4*  12.0  10.2*   < >   --    RETICABSCT  59.9  52.0   --   53.8  52.7  93.0   < >   --    RETP  1.5  1.3   --   1.4  1.3  3.0*   < >   --     < > = values in this interval not displayed.        6/19/2017 09:52 9/12/2017 08:28 12/4/2017 14:54 2/27/2018 09:31   Haptoglobin <6 (L) <6 (L) <6 (L) <6 (L)   IGG 1050 977 985 878    76 72 78          ASSESSMENT AND PLAN   1. 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 Common variable immunodeficiency disorder  4. Splenomegaly    Tricia is doing well and has no new complains. I reviewed all her labs with her. They have been improving since last visit. For the most important one - her Hgb has improved to 12.4 g/dl this time. She continues to have no Ig A, though her Ig G/Ig M are stable with replacement. She continues to hemolyze and has undetectable haptoglobin, elevated bilirubin 0.9 improved from 1.4 and 2 in recent past, elevated LDH at 362 improved from 394, 764 and 977 previously.      She has a fair response to Ig infusions and it has helped her Hgb counts too. We would continue to do her infusions here and space it out to every 6 weeks.     I will see her in 12 weeks with labs.      Jose Summers  Adj ,  Division of Hematology, Oncology & Transplantation  [facility].

## 2018-03-06 NOTE — PROGRESS NOTES
"Infusion Nursing Note:  Tricia Sosa presents today for IVIG with premeds.    Patient seen by provider today: Yes: Melina   present during visit today: Not Applicable.    Note: Pt tolerates IVIG with SoluCortef only.  Does not take Tylenol or Benadryl.    IVIG initiated at rate of 0.5mg/kg/hr.  Increased by 1.0mg/kg/hr every 15 minutes to max rate of 4.0mg/kg/hr.    Intravenous Access:  Peripheral IV placed.    Treatment Conditions:  Rheumatology Infusion Checklist: PRIOR TO INFUSION OF BIOLOGICAL MEDICATIONS OR ANY OF THESE AS LISTED: Immune Globulin (IVIG) \".rheumbiologicalchecklist\"    Prior to Infusion of biological medications or any of these as listed:    1. Elevated temperature, fever, chills, productive cough or abnormal vital signs, night sweats, coughing up blood or sputum, no appetite or abnormal vital signs : NO    2. Open wounds or new incisions: NO    3. Recent hospitalization: NO    4.  Recent surgeries:  NO    5. Any upcoming surgeries or dental procedures?:NO    6. Any current or recent bouts of illness or infection? On any antibiotics? : NO    7. Any new, sudden or worsening abdominal pain :NO    8. Vaccination within 4 weeks? Patient or someone in the household is scheduled to receive vaccination? No live virus vaccines prior to or during treatment :NO    9. Any nervous system diseases [i.e. multiple sclerosis, Guillain-[place], seizures, neurological  changes]: NO    10. Pregnant or breast feeding; or plans on pregnancy in the future: NO    11. Signs of worsening depression or suicidal ideations while taking benlysta:NO    12. New-onset medical symptoms: NO    13.  New cancer diagnosis or on chemotherapy or radiation NO    14.  Evaluate for any sign of active TB [Unexplained weight loss, Loss of appetite, Night sweats, Fever, Fatigue, Chills, Coughing for 3 weeks or longer, Hemoptysis (coughing up blood), Chest pain]: NO    **Note: If answered yes to any of the above, hold the infusion " and contact ordering rheumatologist or on-call rheumatologist.   .      Post Infusion Assessment:  Patient tolerated infusion without incident.  Blood return noted pre and post infusion.  Site patent and intact, free from redness, edema or discomfort.  No evidence of extravasations.  Access discontinued per protocol.  Rheumatology Post Infusion: POST-INFUSION OF BIOLOGICAL MEDICATION:    Reviewed with patient.  Given biologic medication or medication hand-out. Inform patient if any fever, chills or signs of infection, new symptoms, abdominal pain, heart palpitations, shortness of breath, reaction, weakness, neurological changes, seek medical attention immediately and should not receive infusions. No live virus vaccines prior to or during treatment or up to 6 months post infusion. If the patient has an upcoming procedure or surgery, this should be discussed with the rheumatologist and surgeon or provider..    Discharge Plan:   Discharge instructions reviewed with: Patient.  Copy of AVS reviewed with patient and/or family.  Patient will return 4/17/18 for labs/IVIG for next appointment.  Patient discharged in stable condition accompanied by: self.  Departure Mode: Ambulatory.    Estrellita Davis RN

## 2018-03-06 NOTE — PATIENT INSTRUCTIONS
IV Ig  infusions every 6 weeks Scheduled  Aura KELLEY      Follow up in 12 weeks with labs prior to visit to see me. Scheduled  Aura KELLEY      CBC w Diff, CMP and LDH; B12, retic, ferritin, iron panel, Ig subsets, haptoglobin-  Scheduled  Aura KELLEY  AVS given to patient

## 2018-03-06 NOTE — MR AVS SNAPSHOT
After Visit Summary   3/6/2018    Tricia Sosa    MRN: [id number]           Patient Information     Date Of Birth          1940        Visit Information        Provider Department      3/6/2018 11:00 AM RH INFUSION CHAIR 4 [facility]        Today's Diagnoses     CVID (common variable immunodeficiency) (H)    -  1       Follow-ups after your visit        Your next 10 appointments already scheduled     Mar 22, 2018 11:20 AM CDT   BRENDA Spine with Estela Rocha PT   [facility] [place] [facility] (BR[facility]DA [place])    [address]  [place] [address]   [phone]            Apr 17, 2018 10:00 AM CDT   Level 5 with RH INFUSION CHAIR 1   [facility] ([facility])    [facility]  [address]  [facility] [address]   [phone]            May 29, 2018  9:30 AM CDT   Lab with Infusion with RH LAB DRAW 1   [facility] ([facility])    [facility]  [address]  [facility] [address]   [phone]            May 29, 2018  9:45 AM CDT   Return Visit with Jose Summers MD   [facility] ([facility])    [facility]  [address]  [facility] [address]   [phone]            May 29, 2018 10:30 AM CDT   Level 4 with RH INFUSION CHAIR 2   [facility] ([facility])    [facility]  [address]  [facility] [address]   [phone]            Jul 10, 2018 10:00 AM CDT   Level 5 with RH INFUSION CHAIR 1   [facility] ([facility])    [facility]  [address]  [facility] [address]   [phone]            Aug 21, 2018 10:00 AM CDT   Level 5  with  INFUSION CHAIR 2   [facility] ([facility])    [facility]  [address] [place] [address] [facility] [address] [phone]              Who to contact     If you have questions or need follow up information about today's clinic visit or your schedule please contact [facility] INF[facility]ION [facility] directly at [phone].  Normal or non-critical lab and imaging results will be communicated to you by TriPlayhart, letter or phone within 4 business days after the clinic has received the results. If you do not hear from us within 7 days, please contact the clinic through Pontist or phone. If you have a critical or abnormal lab result, we will notify you by phone as soon as possible.  Submit refill requests through [employer] or call your pharmacy and they will forward the refill request to us. Please allow 3 business days for your refill to be completed.          Additional Information About Your Visit        TriPlayharPocket Information     [employer] gives you secure access to your electronic health record. If you see a primary care provider, you can also send messages to your care team and make appointments. If you have questions, please call your primary care clinic.  If you do not have a primary care provider, please call [phone] and they will assist you.        Care EveryWhere ID     This is your Care EveryWhere ID. This could be used by other organizations to access your [place] medical records  [id number]         Blood Pressure from Last 3 Encounters:   03/06/18 116/65   02/06/18 145/74   01/09/18 129/63    Weight from Last 3 Encounters:   03/06/18 71.8 kg (158 lb 6.4 oz)   02/06/18 72.6 kg (160 lb 1.6 oz)   01/09/18 72.1 kg (159 lb)              Today, you had the following     No orders found for display       Primary Care Provider Office Phone # Fax #    SHANIQUE Walsh Ra [facility] [phone] [address]  [facility] [address]        Equal Access to Services     ABBYELVIN MARTINEZ : Hadii carlin rios hadharmanpina Sodarali, walesada luqleisaha, qajaymieta kamauriloidaniel jarajulietadaniel, waxay idiin haycarlos manuelvipin burrjennbillie ackerman . So [facility] [phone].    ATENCIÓN: Si habla español, tiene a dial disposición servicios gratuitos de asistencia lingüística. Llame al [phone].    We comply with applicable federal civil rights laws and [place] laws. We do not discriminate on the basis of race, color, national origin, age, disability, sex, sexual orientation, or gender identity.            Thank you!     Thank you for choosing [facility] INFUSION SERVICES  for your care. Our goal is always to provide you with excellent care. Hearing back from our patients is one way we can continue to improve our services. Please take a few minutes to complete the written survey that you may receive in the mail after your visit with us. Thank you!             Your Updated Medication List - Protect others around you: Learn how to safely use, store and throw away your medicines at www.disposemymeds.org.          This list is accurate as of 3/6/18 12:28 PM.  Always use your most recent med list.                   Brand Name Dispense Instructions for use Diagnosis    cyanocobalamin 1000 MCG tablet    vitamin  B-12      Anemia, Back pain       Fluocinolone Acetonide Scalp 0.01 % Oil oil           folic acid 1 MG tablet    FOLVITE          ketoconazole 2 % shampoo    NIZORAL          levothyroxine 150 MCG tablet    SYNTHROID/LEVOTHROID    90 tablet    TAKE 1 TABLET BY MOUTH ONE TIME DAILY    Hypothyroidism, unspecified type       sulfamethoxazole-trimethoprim 800-160 MG per tablet    BACTRIM DS/SEPTRA DS    45 tablet    Take 1 pill orally on Mon, Wed and Friday    CVID (common variable immunodeficiency) (H), Other autoimmune hemolytic anemias (H), B12 deficiency

## 2018-03-06 NOTE — MR AVS SNAPSHOT
After Visit Summary   3/6/2018    Tricia Sosa    MRN: [id number]           Patient Information     Date Of Birth          1940        Visit Information        Provider Department      3/6/2018 10:15 AM Jose Summers MD [facility]        Today's Diagnoses     CVID (common variable immunodeficiency) (H)    -  1    Other autoimmune hemolytic anemias (H)        B12 deficiency        Other specified hypothyroidism          Care Instructions    IV Ig  infusions every 6 weeks    Follow up in 12 weeks with labs prior to visit to see me.     CBC w Diff, CMP and LDH; B12, retic, ferritin, iron panel, Ig subsets, haptoglobin-                    Follow-ups after your visit        Your next 10 appointments already scheduled     Mar 06, 2018 11:00 AM CST   Level 5 with RH INFUSION CHAIR 4   [facility] ([facility])    [facility]  [address]  [facility] [address]   [phone]            Mar 22, 2018 11:20 AM CDT   BRENDA Spine with Estela Rocha PT   [facility] [place] [facility] ([facility] [place])    [address] [place] [address]   [phone]            May 29, 2018 10:00 AM CDT   Level 5 with RH INFUSION CHAIR 2   [facility] ([facility])    [facility]  [address]  [facility] [address] [phone]            Aug 21, 2018 10:00 AM CDT   Level 5 with RH INFUSION CHAIR 2   [facility] ([facility])    [facility]  [address]  [facility] [address]   [phone]              Who to contact     If you have questions or need follow up information about today's clinic visit or your schedule please contact [facility] CANCER [facility] directly at [phone].  Normal or non-critical  lab and imaging results will be communicated to you by MyChart, letter or phone within 4 business days after the clinic has received the results. If you do not hear from us within 7 days, please contact the clinic through [employer] or phone. If you have a critical or abnormal lab result, we will notify you by phone as soon as possible.  Submit refill requests through [employer] or call your pharmacy and they will forward the refill request to us. Please allow 3 business days for your refill to be completed.          Additional Information About Your Visit        [employer]ori Information     [employer] gives you secure access to your electronic health record. If you see a primary care provider, you can also send messages to your care team and make appointments. If you have questions, please call your primary care clinic.  If you do not have a primary care provider, please call [phone] and they will assist you.        Care EveryWhere ID     This is your Care EveryWhere ID. This could be used by other organizations to access your [place] medical records  [id number]        Your Vitals Were     Pulse Temperature Pulse Oximetry BMI (Body Mass Index)          78 97.2  F (36.2  C) (Tympanic) 98% 21.48 kg/m2         Blood Pressure from Last 3 Encounters:   03/06/18 116/65   02/06/18 145/74   01/09/18 129/63    Weight from Last 3 Encounters:   03/06/18 71.8 kg (158 lb 6.4 oz)   02/06/18 72.6 kg (160 lb 1.6 oz)   01/09/18 72.1 kg (159 lb)              Today, you had the following     No orders found for display       Primary Care Provider Office Phone # Fax #    MonikaSHANIQUE Recio Ra [facility] [phone]       [address] [facility] [address]        Equal Access to Services     [facility] MARTINEZ AH: Hadii carlin amos Socarlos, waaxda luqadaha, qaybta kaalmada adeegyada, naya leon. So [facility] [phone].    ATENCIÓN: Si habla español, tiene a dial disposición servicios gratuitos de  asistencia lingüística. Carlos Eduardo al [phone].    We comply with applicable federal civil rights laws and [place] laws. We do not discriminate on the basis of race, color, national origin, age, disability, sex, sexual orientation, or gender identity.            Thank you!     Thank you for choosing [facility]CER CARE  for your care. Our goal is always to provide you with excellent care. Hearing back from our patients is one way we can continue to improve our services. Please take a few minutes to complete the written survey that you may receive in the mail after your visit with us. Thank you!             Your Updated Medication List - Protect others around you: Learn how to safely use, store and throw away your medicines at www.disposemymeds.org.          This list is accurate as of 3/6/18 10:19 AM.  Always use your most recent med list.                   Brand Name Dispense Instructions for use Diagnosis    cyanocobalamin 1000 MCG tablet    vitamin  B-12      Anemia, Back pain       Fluocinolone Acetonide Scalp 0.01 % Oil oil           folic acid 1 MG tablet    FOLVITE          ketoconazole 2 % shampoo    NIZORAL          levothyroxine 150 MCG tablet    SYNTHROID/LEVOTHROID    90 tablet    TAKE 1 TABLET BY MOUTH ONE TIME DAILY    Hypothyroidism, unspecified type       sulfamethoxazole-trimethoprim 800-160 MG per tablet    BACTRIM DS/SEPTRA DS    45 tablet    Take 1 pill orally on Mon, Wed and Friday    CVID (common variable immunodeficiency) (H), Other autoimmune hemolytic anemias (H), B12 deficiency

## 2018-03-06 NOTE — LETTER
3/6/2018         RE: Tricia Sosa  [address]  [place] [address]        Dear Colleague,    Thank you for referring your patient, Tricia Sosa, to the [facility] CANCER [facility]ARE. Please see a copy of my visit note below.    [facility]CER CL[facility]IC  FOLLOW-UP VISIT NOTE    PATIENT NAME: Tricia Sosa MRN # [id number]  DATE OF VISIT: Mar 6, 2018 YOB: 1940    REFERRING PROVIDER: No referring provider defined for this encounter.    DIAGNOSIS: Hemolytic anemia-autoimmune; IgA deficiency    TREATMENT SUMMARY:  Tricia has been diagnosed with IgA deficiency since her around 2000.  She was very sick at the time and had severe diarrhea.  She lost about 40 pounds in weight.  The workup revealed that she had markedly diminished CD4 counts of 48 and was diagnosed with CMV colitis.  Since then she has been followed in hematology clinic at [place] until recently.  She was noted to have anemia which was fairly long-standing.  She was initially referred for anemia in 2004 and also had a bone marrow biopsy done at that time which revealed hypercellular bone marrow with 70-80% cellularity and normal trilineage hematopoiesis and no morphologic features of MDS or lymphoproliferation.  Her anemia was attributed to her chronic underlying disease.  At the next evaluation in 2002 on 4 aggressive anemia there was no evidence of hemolysis, iron deficiency, B12 or folate deficiency.  Bone marrow biopsy was not pursued.  In summer of 2015 she had progressive fatigue, dyspnea on exertion and worsening anemia with a hemoglobin now of 9.  Workup at this time did suggest a hemolytic anemia with elevated LDH at 709, undetectable haptoglobin and elevated unconjugated bilirubin at 3.3.  Monospecific MARIKA was positive for both IgG and complement.  Cold agglutinin screen was positive with very low titer 1:64.  She was started on prednisone 60 mg daily with supplementation of iron folate  and B12 starting 7/31/15.  Her prednisone has been slowly tapered and was discontinued off in January 2016.  She was last followed at [facility] on March 10 when she had stable hemoglobin.      SUBJECTIVE   Tricia comes alone for this clinic visit for her hemolytic anemia.    Tricia is doing well overall. She has no new complains. She is here with labs done prior to clinic visit.       PAST MEDICAL HISTORY   1. Common variable immunodeficiency syndrome  2. 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 Selective IgA deficiency  4. Leukopenia with markedly low CD4 counts on Bactrim prophylaxis  5. History of fall with subdural hematoma in 6/11/14 with complete resolution  6. Hashimoto's disease status post partial thyroidectomy      CURRENT OUTPATIENT MEDICATIONS     Current Outpatient Prescriptions   Medication Sig     sulfamethoxazole-trimethoprim (BACTRIM DS/SEPTRA DS) 800-160 MG per tablet Take 1 pill orally on Mon, Wed and Friday     FLUOCINOLONE ACETONIDE SCALP 0.01 % OIL oil      levothyroxine (SYNTHROID/LEVOTHROID) 150 MCG tablet TAKE 1 TABLET BY MOUTH ONE TIME DAILY      ketoconazole (NIZORAL) 2 % shampoo      cyanocobalamin (VITAMIN  B-12) 1000 MCG tablet      folic acid (FOLVITE) 1 MG tablet      No current facility-administered medications for this visit.         ALLERGIES     Allergies   Allergen Reactions     Doxycycline         REVIEW OF SYSTEMS   As above in the HPI, o/w complete 12-point ROS was negative.     PHYSICAL EXAM   /65  Pulse 78  Temp 97.2  F (36.2  C) (Tympanic)  Wt 71.8 kg (158 lb 6.4 oz)  SpO2 98%  BMI 21.48 kg/m2    SpO2 Readings from Last 4 Encounters:   12/12/17 100%   10/16/17 97%   10/09/17 96%   08/22/17 96%     Wt Readings from Last 3 Encounters:   02/06/18 72.6 kg (160 lb 1.6 oz)   01/09/18 72.1 kg (159 lb)   12/12/17 71.7 kg (158 lb)     GEN: NAD  HEENT: PERRL, EOMI, no icterus, injection or pallor. Oropharynx is clear.  NECK: no  cervical or supraclavicular lymphadenopathy  LUNGS: clear bilaterally  CV: regular, no murmurs, rubs, or gallops  ABDOMEN: soft, non-tender, non-distended, normal bowel sounds, no hepatosplenomegaly by percussion or palpation  EXT: warm, well perfused, no edema  NEURO: alert  SKIN: no rashes     LABORATORY AND IMAGING STUDIES     Recent Labs   Lab Test  02/27/18   0931  12/04/17   1454  10/20/17   1039  09/12/17   0828  06/19/17   0952   NA  138  139  137  140  143   POTASSIUM  4.0  4.0  4.4  4.1  4.0   CHLORIDE  105  106  104  105  107   CO2  29  29  29  25  28   ANIONGAP  4  4  4  10  8   BUN  14  12  12  15  9   CR  0.81  0.69  0.61  0.72  0.74   GLC  82  83  73  110*  89   CULLEN  8.5  8.6  8.6  8.6  8.6     No results for input(s): MAG, PHOS in the last 40292 hours.  Recent Labs   Lab Test  02/27/18   0931  12/04/17   1454  10/20/17   1039  09/12/17   0828  06/19/17   0952   WBC  4.7  4.5  4.8  3.8*  3.4*   HGB  12.4  11.8  12.4  11.4*  12.0   PLT  154  166  155  144*  157   MCV  89  89  89  88  89   NEUTROPHIL  61.2  62.4  64.5  62.0  64.3     Recent Labs   Lab Test  02/27/18   0931  12/04/17   1454  09/12/17   0828   BILITOTAL  0.9  0.7  0.9   ALKPHOS  114  117  101   ALT  25  35  36   AST  46*  50*  50*   ALBUMIN  4.1  3.9  3.8   LDH  305*  362*  340*     TSH   Date Value Ref Range Status   10/20/2017 1.90 0.40 - 4.00 mU/L Final   06/19/2017 1.82 0.40 - 4.00 mU/L Final   04/03/2017 6.29 (H) 0.40 - 4.00 mU/L Final     Recent Labs   Lab Test  02/27/18   0931  12/04/17   1454  10/20/17   1039  09/12/17   0828  06/19/17   0952  05/01/17   1022   04/14/15   1116   B12  687  825   --   636  712  722   < >  277   FOLIC   --    --    --    --    --    --    --   10.7   HGB  12.4  11.8  12.4  11.4*  12.0  10.2*   < >   --    RETICABSCT  59.9  52.0   --   53.8  52.7  93.0   < >   --    RETP  1.5  1.3   --   1.4  1.3  3.0*   < >   --     < > = values in this interval not displayed.        6/19/2017 09:52 9/12/2017 08:28  12/4/2017 14:54 2/27/2018 09:31   Haptoglobin <6 (L) <6 (L) <6 (L) <6 (L)   IGG 1050 977 985 878    76 72 78          ASSESSMENT AND PLAN   1. 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 Common variable immunodeficiency disorder  4. Splenomegaly    Tricia is doing well and has no new complains. I reviewed all her labs with her. They have been improving since last visit. For the most important one - her Hgb has improved to 12.4 g/dl this time. She continues to have no Ig A, though her Ig G/Ig M are stable with replacement. She continues to hemolyze and has undetectable haptoglobin, elevated bilirubin 0.9 improved from 1.4 and 2 in recent past, elevated LDH at 362 improved from 394, 764 and 977 previously.      She has a fair response to Ig infusions and it has helped her Hgb counts too. We would continue to do her infusions here and space it out to every 6 weeks.     I will see her in 12 weeks with labs.      Jose Summers  Adj ,  Division of Hematology, Oncology & Transplantation  [facility].        Again, thank you for allowing me to participate in the care of your patient.        Sincerely,        Jose Summers MD

## 2018-03-22 ENCOUNTER — THERAPY VISIT (OUTPATIENT)
Dept: PHYSICAL THERAPY | Facility: CLINIC | Age: 78
End: 2018-03-22
Payer: MEDICARE

## 2018-03-22 DIAGNOSIS — M54.41 RIGHT-SIDED LOW BACK PAIN WITH RIGHT-SIDED SCIATICA, UNSPECIFIED CHRONICITY: ICD-10-CM

## 2018-03-22 PROCEDURE — 97112 NEUROMUSCULAR REEDUCATION: CPT | Mod: GP | Performed by: PHYSICAL THERAPIST

## 2018-03-22 PROCEDURE — 97110 THERAPEUTIC EXERCISES: CPT | Mod: GP | Performed by: PHYSICAL THERAPIST

## 2018-04-07 DIAGNOSIS — E03.9 HYPOTHYROIDISM, UNSPECIFIED TYPE: ICD-10-CM

## 2018-04-08 RX ORDER — DIPHENHYDRAMINE HCL 25 MG
25 CAPSULE ORAL
Status: CANCELLED | OUTPATIENT
Start: 2018-04-08

## 2018-04-08 RX ORDER — ACETAMINOPHEN 325 MG/1
650 TABLET ORAL
Status: CANCELLED
Start: 2018-04-08

## 2018-04-09 NOTE — TELEPHONE ENCOUNTER
"Requested Prescriptions   Pending Prescriptions Disp Refills     levothyroxine (SYNTHROID/LEVOTHROID) 150 MCG tablet [Pharmacy Med Name: Levothyroxine Sodium Oral Tablet 150 MCG]  Last Written Prescription Date:  7/7/17  Last Fill Quantity: 90,  # refills: 2   Last office visit: 10/20/2017 with prescribing provider:  10/20/2017     Future Office Visit:   Next 5 appointments (look out 90 days)     May 29, 2018  9:45 AM CDT   Return Visit with Jose Summers MD   [facility] ([facility])    [facility]  [address]  [facility] [address]   [phone]                  90 tablet 1     Sig: TAKE 1 TABLET BY MOUTH ONCE DAILY    Thyroid Protocol Passed    4/7/2018  9:12 AM       Passed - Patient is 12 years or older       Passed - Recent (12 mo) or future (30 days) visit within the authorizing provider's specialty    Patient had office visit in the last 12 months or has a visit in the next 30 days with authorizing provider or within the authorizing provider's specialty.  See \"Patient Info\" tab in inbasket, or \"Choose Columns\" in Meds & Orders section of the refill encounter.           Passed - Normal TSH on file in past 12 months    Recent Labs   Lab Test  10/20/17   1039   TSH  1.90             Passed - No active pregnancy on record    If patient is pregnant or has had a positive pregnancy test, please check TSH.         Passed - No positive pregnancy test in past 12 months    If patient is pregnant or has had a positive pregnancy test, please check TSH.            "

## 2018-04-10 RX ORDER — LEVOTHYROXINE SODIUM 150 UG/1
TABLET ORAL
Qty: 90 TABLET | Refills: 1 | Status: SHIPPED | OUTPATIENT
Start: 2018-04-10 | End: 2018-10-09

## 2018-04-10 NOTE — TELEPHONE ENCOUNTER
Prescription approved per [facility] Refill Protocol.    Nataliia SAN RN, BSN, PHN  Quinnesec Flex RN

## 2018-04-17 ENCOUNTER — INFUSION THERAPY VISIT (OUTPATIENT)
Dept: INFUSION THERAPY | Facility: CLINIC | Age: 78
End: 2018-04-17
Attending: INTERNAL MEDICINE
Payer: MEDICARE

## 2018-04-17 VITALS
RESPIRATION RATE: 18 BRPM | OXYGEN SATURATION: 100 % | HEART RATE: 60 BPM | DIASTOLIC BLOOD PRESSURE: 53 MMHG | BODY MASS INDEX: 21.42 KG/M2 | SYSTOLIC BLOOD PRESSURE: 123 MMHG | WEIGHT: 158 LBS

## 2018-04-17 DIAGNOSIS — D83.9 CVID (COMMON VARIABLE IMMUNODEFICIENCY) (H): Primary | ICD-10-CM

## 2018-04-17 PROCEDURE — 96366 THER/PROPH/DIAG IV INF ADDON: CPT

## 2018-04-17 PROCEDURE — 25000128 H RX IP 250 OP 636: Performed by: INTERNAL MEDICINE

## 2018-04-17 PROCEDURE — 96375 TX/PRO/DX INJ NEW DRUG ADDON: CPT

## 2018-04-17 PROCEDURE — 96365 THER/PROPH/DIAG IV INF INIT: CPT

## 2018-04-17 RX ORDER — ACETAMINOPHEN 325 MG/1
650 TABLET ORAL
Status: CANCELLED
Start: 2018-04-17

## 2018-04-17 RX ORDER — DIPHENHYDRAMINE HCL 25 MG
25 CAPSULE ORAL
Status: CANCELLED | OUTPATIENT
Start: 2018-04-17

## 2018-04-17 RX ADMIN — HYDROCORTISONE SODIUM SUCCINATE 100 MG: 100 INJECTION, POWDER, FOR SOLUTION INTRAMUSCULAR; INTRAVENOUS at 10:13

## 2018-04-17 RX ADMIN — HUMAN IMMUNOGLOBULIN G 35 G: 20 LIQUID INTRAVENOUS at 10:30

## 2018-04-17 NOTE — PROGRESS NOTES
"Infusion Nursing Note:  Tricia Sosa presents today for IVIG.    Patient seen by provider today: No   present during visit today: Not Applicable.    Note: this is first time of a 6 week span since last dose    Intravenous Access:  Peripheral IV placed.    Treatment Conditions:  Rheumatology Infusion Checklist: PRIOR TO INFUSION OF BIOLOGICAL MEDICATIONS OR ANY OF THESE AS LISTED: Immune Globulin (IVIG) \".rheumbiologicalchecklist\"    Prior to Infusion of biological medications or any of these as listed:    1. Elevated temperature, fever, chills, productive cough or abnormal vital signs, night sweats, coughing up blood or sputum, no appetite or abnormal vital signs : NO    2. Open wounds or new incisions: NO    3. Recent hospitalization: NO    4.  Recent surgeries:  NO    5. Any upcoming surgeries or dental procedures?:NO    6. Any current or recent bouts of illness or infection? On any antibiotics? : NO    7. Any new, sudden or worsening abdominal pain :NO    8. Vaccination within 4 weeks? Patient or someone in the household is scheduled to receive vaccination? No live virus vaccines prior to or during treatment :NO    9. Any nervous system diseases [i.e. multiple sclerosis, Guillain-[place], seizures, neurological  changes]: NO    10. Pregnant or breast feeding; or plans on pregnancy in the future: NO    11. Signs of worsening depression or suicidal ideations while taking benlysta:NO    12. New-onset medical symptoms: NO    13.  New cancer diagnosis or on chemotherapy or radiation NO    14.  Evaluate for any sign of active TB [Unexplained weight loss, Loss of appetite, Night sweats, Fever, Fatigue, Chills, Coughing for 3 weeks or longer, Hemoptysis (coughing up blood), Chest pain]: NO    **Note: If answered yes to any of the above, hold the infusion and contact ordering rheumatologist or on-call rheumatologist.   .  Infusion initiated @ 0.5 ml/kg/hr x 15 min  incr to 1 ml/kg/hr x 15 min  incr to 2 " ml/kg/hr x 15 min  incr to 3 ml/kg/hr x 15 min  incr to 4 ml/kg/hr to completion      Post Infusion Assessment:  Patient tolerated infusion without incident.  Blood return noted pre and post infusion.  Site patent and intact, free from redness, edema or discomfort.  No evidence of extravasations.  Access discontinued per protocol.    Discharge Plan:   Patient and/or family verbalized understanding of discharge instructions and all questions answered.  AVS to patient via "[employer]HART.  Patient will return 5/29 for next appointment.   Patient discharged in stable condition accompanied by: self.  Departure Mode: Ambulatory.    Serina Otto RN

## 2018-04-17 NOTE — MR AVS SNAPSHOT
After Visit Summary   4/17/2018    Tricia Sosa    MRN: [id number]           Patient Information     Date Of Birth          1940        Visit Information        Provider Department      4/17/2018 10:00 AM RH INFUSION CHAIR 1 [facility]        Today's Diagnoses     CVID (common variable immunodeficiency) (H)    -  1       Follow-ups after your visit        Your next 10 appointments already scheduled     Apr 27, 2018 11:00 AM CDT   [facility] Spine with Estela Rocha PT   [facility] [place] [facility] ([facility] [place])    [address]  [place] [address]   [phone]            May 29, 2018  9:30 AM CDT   Lab with Infusion with RH LAB DRAW 1   [facility] ([facility])    [facility] [place] [address]  [facility] [address]   [phone]            May 29, 2018  9:45 AM CDT   Return Visit with Jose Summres MD   [facility] ([facility])    [facility] [place] [address]  [place] [address]   [phone]            May 29, 2018 10:30 AM CDT   Level 4 with RH INFUSION CHAIR 2   [facility] ([facility])    [facility]  [address]  [facility] [address]   [phone]            Jul 10, 2018 10:00 AM CDT   Level 5 with RH INFUSION CHAIR 1   [facility] ([facility])    [facility]  [address]  [facility] [address]   [phone]            Aug 21, 2018 10:00 AM CDT   Level 5 with RH INFUSION CHAIR 2   [facility] ([facility])    [facility]  [address]  [facility] [address]   [phone]              Who to contact     If you have  questions or need follow up information about today's clinic visit or your schedule please contact [facility] INF[facility]ION [facility] directly at [phone].  Normal or non-critical lab and imaging results will be communicated to you by MyChart, letter or phone within 4 business days after the clinic has received the results. If you do not hear from us within 7 days, please contact the clinic through Nomad Gameshart or phone. If you have a critical or abnormal lab result, we will notify you by phone as soon as possible.  Submit refill requests through Red Zebra or call your pharmacy and they will forward the refill request to us. Please allow 3 business days for your refill to be completed.          Additional Information About Your Visit        Nomad GamesharModify Information     [employer] Zebra gives you secure access to your electronic health record. If you see a primary care provider, you can also send messages to your care team and make appointments. If you have questions, please call your primary care clinic.  If you do not have a primary care provider, please call [phone] and they will assist you.        Care EveryWhere ID     This is your Care EveryWhere ID. This could be used by other organizations to access your [place] medical records  [id number]        Your Vitals Were     Pulse Respirations Pulse Oximetry BMI (Body Mass Index)          60 18 100% 21.42 kg/m2         Blood Pressure from Last 3 Encounters:   04/17/18 123/53   03/06/18 116/65   02/06/18 145/74    Weight from Last 3 Encounters:   04/17/18 71.7 kg (158 lb)   03/06/18 71.8 kg (158 lb 6.4 oz)   02/06/18 72.6 kg (160 lb 1.6 oz)              Today, you had the following     No orders found for display       Primary Care Provider Office Phone # Fax #    SHANIQUE [facility] [phone]       [address]  [facility] [address]        Equal Access to Services     ZIA HENRIQUEZ : andry Mccollum,  larissa jeanethmauriliodaniel jaranaya morse selamin hayaan adeeg kharash la'aan ah. So [facility] [phone].    ATENCIÓN: Si jr salas, tiene a dial disposición servicios gratuitos de asistencia lingüística. Carlos Eduardo al [phone].    We comply with applicable federal civil rights laws and [place] laws. We do not discriminate on the basis of race, color, national origin, age, disability, sex, sexual orientation, or gender identity.            Thank you!     Thank you for choosing [facility]ION [facility]  for your care. Our goal is always to provide you with excellent care. Hearing back from our patients is one way we can continue to improve our services. Please take a few minutes to complete the written survey that you may receive in the mail after your visit with us. Thank you!             Your Updated Medication List - Protect others around you: Learn how to safely use, store and throw away your medicines at www.disposemymeds.org.          This list is accurate as of 4/17/18 12:36 PM.  Always use your most recent med list.                   Brand Name Dispense Instructions for use Diagnosis    cyanocobalamin 1000 MCG tablet    vitamin  B-12      Anemia, Back pain       Fluocinolone Acetonide Scalp 0.01 % Oil oil           folic acid 1 MG tablet    FOLVITE          ketoconazole 2 % shampoo    NIZORAL          levothyroxine 150 MCG tablet    SYNTHROID/LEVOTHROID    90 tablet    TAKE 1 TABLET BY MOUTH ONCE DAILY    Hypothyroidism, unspecified type       sulfamethoxazole-trimethoprim 800-160 MG per tablet    BACTRIM DS/SEPTRA DS    45 tablet    Take 1 pill orally on Mon, Wed and Friday    CVID (common variable immunodeficiency) (H), Other autoimmune hemolytic anemias (H), B12 deficiency

## 2018-05-17 ENCOUNTER — OFFICE VISIT (OUTPATIENT)
Dept: URGENT CARE | Facility: URGENT CARE | Age: 78
End: 2018-05-17
Payer: COMMERCIAL

## 2018-05-17 VITALS
SYSTOLIC BLOOD PRESSURE: 134 MMHG | TEMPERATURE: 97.5 F | HEART RATE: 75 BPM | DIASTOLIC BLOOD PRESSURE: 62 MMHG | OXYGEN SATURATION: 98 % | BODY MASS INDEX: 21.15 KG/M2 | WEIGHT: 156 LBS

## 2018-05-17 DIAGNOSIS — S01.00XA OPEN WOUND OF SCALP, UNSPECIFIED OPEN WOUND TYPE, INITIAL ENCOUNTER: Primary | ICD-10-CM

## 2018-05-17 PROCEDURE — 99213 OFFICE O/P EST LOW 20 MIN: CPT | Performed by: FAMILY MEDICINE

## 2018-05-17 NOTE — PROGRESS NOTES
"SUBJECTIVE:     Chief Complaint   Patient presents with     Urgent Care     Laceration     cut on scalp today from garage door      Tricia Sosa is a 77 year old female who presents to the clinic with a laceration on the scalp sustained 2 hour(s) ago.  This is a non-work related and accidental injury.  Patient was going under the garage door, ducking to be faster so did not see plywood.  Mechanism of injury: piece of wood.    Associated symptoms: Denies loss of consciousness, vomiting or confusion.  Denies numbness, weakness, or loss of function  Last tetanus booster within 10 years: yes, 2011    EXAM:   The patient appears today in alert,no apparent distress distress  VITALS: /62 (BP Location: Right arm)  Pulse 75  Temp 97.5  F (36.4  C) (Tympanic)  Wt 156 lb (70.8 kg)  SpO2 98%  BMI 21.15 kg/m2    Size of laceration: 1-2 centimeters  Characteristics of the laceration: \"Y\" shaped, superficial, approximates well  Tendon function intact: not applicable  Sensation to light touch intact: yes  Pulses intact: not applicable  Picture included in patient's chart: no    Assessment:  Open wound of scalp, unspecified open wound type, initial encounter    PLAN:  Wound did not splay apart, approximates well.  No need for staple.    Wound cleaned with Shur-Clens  Wound cleaned with sterile water  Topical bacitracin to wound    After care instructions:  Keep wound clean and dry for the next 24-48 hours  Signs of infection discussed today  Apply anti-bacterial ointment for 3-5 days  Discussed the probability of scarring    Jaden Reich MD  May 17, 2018 9:35 PM            "

## 2018-05-17 NOTE — MR AVS SNAPSHOT
After Visit Summary   5/17/2018    Tricia Sosa    MRN: [id number]           Patient Information     Date Of Birth          1940        Visit Information        Provider Department      5/17/2018 4:55 PM Jaden Reich MD [facility]        Today's Diagnoses     Open wound of scalp, unspecified open wound type, initial encounter    -  1      Care Instructions    Topical antibiotic ointment to wound twice a day for 3-5 days.      Small or Superficial Laceration: Not Stitched  A laceration is a cut through the skin. A laceration requires stitches or staples if it is deep or spread open. A small laceration often doesn't require stitches.   You may need a tetanus shot. This may be given if you have no record of this vaccination and the object that caused the cut may lead to tetanus  Home care    Your healthcare provider may prescribe an antibiotic. This is to help prevent infection. Follow all instructions for taking this medicine. Take the medicine every day until it is gone or you are told to stop. You should not have any left over.    The healthcare provider may prescribe medicines for pain. Follow instructions for taking them.    Follow the healthcare provider s instructions on how to care for the cut.    Wash your hands with soap and warm water before and after caring for cut. This helps prevent infection.    Keep the wound clean and dry. If a bandage was applied and it becomes wet or dirty, replace it. Otherwise, leave it in place for the first 24 hours, then change it once a day or as directed.    Clean the wound daily:  ? After removing any bandage, wash the area with soap and water. Use a wet cotton swab to loosen and remove any blood or crust that forms.  ? After cleaning, keep the wound clean and dry. Talk with your healthcare provider before applying any antibiotic ointment to the wound. Reapply a fresh bandage.    You may remove the bandage to shower as usual after the  first 24 hours, but don't soak the area in water (no tub baths or swimming) for the next 5 days.    If the area gets wet, gently pat it dry with a clean cloth. Replace the wet bandage with a dry one.    Don't do activities that may reinjure your wound.    Don't scratch, rub, or pick at the area.    Check the wound daily for signs of infection listed below.  Follow-up care  Follow up with your healthcare provider, or as advised.  When to seek medical advice  Call your healthcare provider right away if any of these occur:    Wound bleeding not controlled by direct pressure    Signs of infection, including increasing pain in the wound, increasing wound redness or swelling, or pus or bad odor coming from the wound    Fever of 100.4 F (38 C) or higher, or as directed by your healthcare provider    Wound edges reopen    Wound changes colors    Numbness around the wound     Decreased movement around the injured area  Date Last Reviewed: 7/1/2017 2000-2017 [employer]. [address] [place] [address]. All rights reserved. This information is not intended as a substitute for professional medical care. Always follow your healthcare professional's instructions.                Follow-ups after your visit        Your next 10 appointments already scheduled     May 23, 2018 10:15 AM CDT   New Visit with Mariela Serna DPM, Podiatry/Foot and Ankle Surgery   [facility] ([facility])    [id number] [place] [address] [phone]            May 29, 2018  9:30 AM CDT   Lab with Infusion with RH LAB DRAW 1   [facility] ([facility])    [facility] [place] [facility]  [address]  [facility] [address]   [phone]            May 29, 2018  9:45 AM CDT   Return Visit with Jose Summers MD   [facility] ([facility])    [facility] [place] [facility]  [address]  [facility] [address]   [phone]            May 29, 2018 10:30 AM CDT   Level 4 with RH INFUSION CHAIR 2   [facility] ([facility])    [facility]  [address]  [place] [address]   [phone]            Jul 10, 2018 10:00 AM CDT   Level 5 with RH INFUSION CHAIR 1   [facility] ([facility])    [facility]  [address]  [facility] [address]   [phone]            Aug 21, 2018 10:00 AM CDT   Level 5 with RH INFUSION CHAIR 2   [facility] ([facility])    [facility]  [address]  [facility] [address]   [phone]              Who to contact     If you have questions or need follow up information about today's clinic visit or your schedule please contact [facility] directly at [phone].  Normal or non-critical lab and imaging results will be communicated to you by Lanthio Pharmahart, letter or phone within 4 business days after the clinic has received the results. If you do not hear from us within 7 days, please contact the clinic through BoostUpt or phone. If you have a critical or abnormal lab result, we will notify you by phone as soon as possible.  Submit refill requests through Enervee or call your pharmacy and they will forward the refill request to us. Please allow 3 business days for your refill to be completed.          Additional Information About Your Visit        L[employer]hart Information     E[employer] gives you secure access to your electronic health record. If you see a primary care provider, you can also send messages to your care team and make appointments. If you have questions, please call your primary care clinic.  If you do not have a primary care provider, please call [phone] and they will assist  you.        Care EveryWhere ID     This is your Care EveryWhere ID. This could be used by other organizations to access your [place] medical records  [id number]        Your Vitals Were     Pulse Temperature Pulse Oximetry BMI (Body Mass Index)          75 97.5  F (36.4  C) (Tympanic) 98% 21.15 kg/m2         Blood Pressure from Last 3 Encounters:   05/17/18 134/62   04/17/18 123/53   03/06/18 116/65    Weight from Last 3 Encounters:   05/17/18 156 lb (70.8 kg)   04/17/18 158 lb (71.7 kg)   03/06/18 158 lb 6.4 oz (71.8 kg)              Today, you had the following     No orders found for display       Primary Care Provider Office Phone # Fax #    Monika SHANIQUE Jason [facility] [phone] [address]  [facility] [address]        Equal Access to Services     [facility] : Hadii aad ku hadasho Soomaali, waaxda luqadaha, qaybta kaalmada adeegyada, waxay selamin haycarlos manueln marek ackerman . So [facility] [phone].    ATENCIÓN: Si habla español, tiene a dial disposición servicios gratuitos de asistencia lingüística. Llame al [phone].    We comply with applicable federal civil rights laws and [place] laws. We do not discriminate on the basis of race, color, national origin, age, disability, sex, sexual orientation, or gender identity.            Thank you!     Thank you for choosing [facility]RG[facility]  for your care. Our goal is always to provide you with excellent care. Hearing back from our patients is one way we can continue to improve our services. Please take a few minutes to complete the written survey that you may receive in the mail after your visit with us. Thank you!             Your Updated Medication List - Protect others around you: Learn how to safely use, store and throw away your medicines at www.disposemymeds.org.          This list is accurate as of 5/17/18  5:30 PM.  Always use your most recent med list.                   Brand Name Dispense Instructions for use  Diagnosis    cyanocobalamin 1000 MCG tablet    vitamin  B-12      Anemia, Back pain       Fluocinolone Acetonide Scalp 0.01 % Oil oil           folic acid 1 MG tablet    FOLVITE          ketoconazole 2 % shampoo    NIZORAL          levothyroxine 150 MCG tablet    SYNTHROID/LEVOTHROID    90 tablet    TAKE 1 TABLET BY MOUTH ONCE DAILY    Hypothyroidism, unspecified type       sulfamethoxazole-trimethoprim 800-160 MG per tablet    BACTRIM DS/SEPTRA DS    45 tablet    Take 1 pill orally on Mon, Wed and Friday    CVID (common variable immunodeficiency) (H), Other autoimmune hemolytic anemias (H), B12 deficiency

## 2018-05-17 NOTE — PATIENT INSTRUCTIONS
Topical antibiotic ointment to wound twice a day for 3-5 days.      Small or Superficial Laceration: Not Stitched  A laceration is a cut through the skin. A laceration requires stitches or staples if it is deep or spread open. A small laceration often doesn't require stitches.   You may need a tetanus shot. This may be given if you have no record of this vaccination and the object that caused the cut may lead to tetanus  Home care    Your healthcare provider may prescribe an antibiotic. This is to help prevent infection. Follow all instructions for taking this medicine. Take the medicine every day until it is gone or you are told to stop. You should not have any left over.    The healthcare provider may prescribe medicines for pain. Follow instructions for taking them.    Follow the healthcare provider s instructions on how to care for the cut.    Wash your hands with soap and warm water before and after caring for cut. This helps prevent infection.    Keep the wound clean and dry. If a bandage was applied and it becomes wet or dirty, replace it. Otherwise, leave it in place for the first 24 hours, then change it once a day or as directed.    Clean the wound daily:  ? After removing any bandage, wash the area with soap and water. Use a wet cotton swab to loosen and remove any blood or crust that forms.  ? After cleaning, keep the wound clean and dry. Talk with your healthcare provider before applying any antibiotic ointment to the wound. Reapply a fresh bandage.    You may remove the bandage to shower as usual after the first 24 hours, but don't soak the area in water (no tub baths or swimming) for the next 5 days.    If the area gets wet, gently pat it dry with a clean cloth. Replace the wet bandage with a dry one.    Don't do activities that may reinjure your wound.    Don't scratch, rub, or pick at the area.    Check the wound daily for signs of infection listed below.  Follow-up care  Follow up with your  healthcare provider, or as advised.  When to seek medical advice  Call your healthcare provider right away if any of these occur:    Wound bleeding not controlled by direct pressure    Signs of infection, including increasing pain in the wound, increasing wound redness or swelling, or pus or bad odor coming from the wound    Fever of 100.4 F (38 C) or higher, or as directed by your healthcare provider    Wound edges reopen    Wound changes colors    Numbness around the wound     Decreased movement around the injured area  Date Last Reviewed: 7/1/2017 2000-2017 [employer]. [address] [place] [address]. All rights reserved. This information is not intended as a substitute for professional medical care. Always follow your healthcare professional's instructions.

## 2018-05-23 ENCOUNTER — OFFICE VISIT (OUTPATIENT)
Dept: PODIATRY | Facility: CLINIC | Age: 78
End: 2018-05-23
Payer: COMMERCIAL

## 2018-05-23 VITALS
SYSTOLIC BLOOD PRESSURE: 120 MMHG | BODY MASS INDEX: 20.99 KG/M2 | WEIGHT: 155 LBS | DIASTOLIC BLOOD PRESSURE: 76 MMHG | HEIGHT: 72 IN

## 2018-05-23 DIAGNOSIS — Q66.70 PES CAVUS: ICD-10-CM

## 2018-05-23 DIAGNOSIS — E53.8 B12 DEFICIENCY: ICD-10-CM

## 2018-05-23 DIAGNOSIS — M20.42 HAMMER TOES, BILATERAL: Primary | ICD-10-CM

## 2018-05-23 DIAGNOSIS — M20.41 HAMMER TOES, BILATERAL: Primary | ICD-10-CM

## 2018-05-23 DIAGNOSIS — D83.9 CVID (COMMON VARIABLE IMMUNODEFICIENCY) (H): ICD-10-CM

## 2018-05-23 DIAGNOSIS — D64.9 ANEMIA: ICD-10-CM

## 2018-05-23 DIAGNOSIS — M20.11 HALLUX VALGUS OF RIGHT FOOT: ICD-10-CM

## 2018-05-23 DIAGNOSIS — D59.19 OTHER AUTOIMMUNE HEMOLYTIC ANEMIAS: ICD-10-CM

## 2018-05-23 DIAGNOSIS — M20.12 HALLUX VALGUS OF LEFT FOOT: ICD-10-CM

## 2018-05-23 LAB
BASOPHILS # BLD AUTO: 0 10E9/L (ref 0–0.2)
BASOPHILS NFR BLD AUTO: 0.2 %
DIFFERENTIAL METHOD BLD: NORMAL
EOSINOPHIL # BLD AUTO: 0.1 10E9/L (ref 0–0.7)
EOSINOPHIL NFR BLD AUTO: 1 %
ERYTHROCYTE [DISTWIDTH] IN BLOOD BY AUTOMATED COUNT: 13.9 % (ref 10–15)
HCT VFR BLD AUTO: 35 % (ref 35–47)
HGB BLD-MCNC: 11.7 G/DL (ref 11.7–15.7)
LDH SERPL L TO P-CCNC: 348 U/L (ref 81–234)
LYMPHOCYTES # BLD AUTO: 1 10E9/L (ref 0.8–5.3)
LYMPHOCYTES NFR BLD AUTO: 19.8 %
MCH RBC QN AUTO: 29.8 PG (ref 26.5–33)
MCHC RBC AUTO-ENTMCNC: 33.4 G/DL (ref 31.5–36.5)
MCV RBC AUTO: 89 FL (ref 78–100)
MONOCYTES # BLD AUTO: 0.8 10E9/L (ref 0–1.3)
MONOCYTES NFR BLD AUTO: 16.6 %
NEUTROPHILS # BLD AUTO: 3.1 10E9/L (ref 1.6–8.3)
NEUTROPHILS NFR BLD AUTO: 62.4 %
PLATELET # BLD AUTO: 158 10E9/L (ref 150–450)
RBC # BLD AUTO: 3.92 10E12/L (ref 3.8–5.2)
RETICS # AUTO: 61.8 10E9/L (ref 25–95)
RETICS/RBC NFR AUTO: 1.6 % (ref 0.5–2)
VIT B12 SERPL-MCNC: 802 PG/ML (ref 193–986)
WBC # BLD AUTO: 4.9 10E9/L (ref 4–11)

## 2018-05-23 PROCEDURE — 85025 COMPLETE CBC W/AUTO DIFF WBC: CPT | Performed by: INTERNAL MEDICINE

## 2018-05-23 PROCEDURE — 83010 ASSAY OF HAPTOGLOBIN QUANT: CPT | Performed by: INTERNAL MEDICINE

## 2018-05-23 PROCEDURE — 82784 ASSAY IGA/IGD/IGG/IGM EACH: CPT | Performed by: INTERNAL MEDICINE

## 2018-05-23 PROCEDURE — 80053 COMPREHEN METABOLIC PANEL: CPT | Performed by: INTERNAL MEDICINE

## 2018-05-23 PROCEDURE — 83550 IRON BINDING TEST: CPT | Performed by: INTERNAL MEDICINE

## 2018-05-23 PROCEDURE — 85045 AUTOMATED RETICULOCYTE COUNT: CPT | Performed by: INTERNAL MEDICINE

## 2018-05-23 PROCEDURE — 36415 COLL VENOUS BLD VENIPUNCTURE: CPT | Performed by: INTERNAL MEDICINE

## 2018-05-23 PROCEDURE — 83615 LACTATE (LD) (LDH) ENZYME: CPT | Performed by: INTERNAL MEDICINE

## 2018-05-23 PROCEDURE — 99203 OFFICE O/P NEW LOW 30 MIN: CPT | Performed by: PODIATRIST

## 2018-05-23 PROCEDURE — 82607 VITAMIN B-12: CPT | Performed by: INTERNAL MEDICINE

## 2018-05-23 PROCEDURE — 83540 ASSAY OF IRON: CPT | Performed by: INTERNAL MEDICINE

## 2018-05-23 PROCEDURE — 82728 ASSAY OF FERRITIN: CPT | Performed by: INTERNAL MEDICINE

## 2018-05-23 NOTE — MR AVS SNAPSHOT
After Visit Summary   5/23/2018    Tricia Sosa    MRN: [id number]           Patient Information     Date Of Birth          1940        Visit Information        Provider Department      5/23/2018 10:15 AM Mariela Serna DPM, Podiatry/Foot and Ankle Surgery [facility] [place]        Care Instructions    Thank you for choosing [facility]!    DR. SERNA'S CLINIC SCHEDULE  MONDAY AM - [place] TUESDAY - [place]   [address] [place] [address] [place] [address]   [phone] / [phone] / [phone]       WEDNESDAY - [place] FRIDAY AM - [facility]   [address]   [place] [address] [place] [address]   [phone] / [phone]       FRIDAY PM - [place] SCHEDULE SURGERY: [phone]   [address] BILLING QUESTIONS: [phone]   [place] [address] AFTER HOURS: [phone] / [phone] APPOINTMENTS: [phone]     Consumer Price Line (CPL) [phone]           Body Mass Index (BMI)  Many things can cause foot and ankle problems. Foot structure, activity level, foot mechanics and injuries are common causes of pain.  One very important issue that often goes unmentioned, is body weight. Extra weight can cause increased stress on muscles, ligaments, bones and tendons.  Sometimes just a few extra pounds is all it takes to put one over her/his threshold. Without reducing that stress, it can be difficult to alleviate pain. Some people are uncomfortable addressing this issue, but we feel it is important for you to think about it. As Foot &  Ankle specialists, our job is addressing the lower extremity problem and possible causes. Regarding extra body weight, we encourage patients to discuss diet and weight management plans with their primary care doctors. It is this team approach that gives you the best opportunity for  pain relief and getting you back on your feet.          HAMMERTOES  Hammertoe is a contracture (bending) of one or both joints of the second, third, fourth, or fifth (little) toes. This abnormal bending can put pressure on the toe when wearing shoes, causing problems to develop.  Hammertoes usually start out as mild deformities and get progressively worse over time. In the earlier stages, hammertoes are flexible and the symptoms can often be managed with noninvasive measures. But if left untreated, hammertoes can become more rigid and will not respond to non-surgical treatment.  Because of the progressive nature of hammertoes, they should receive early attention. Hammertoes never get better without some kind of intervention.  CAUSES  The most common cause of hammertoe is a muscle/tendon imbalance. This imbalance, which leads to a bending of the toe, results from mechanical (structural) changes in the foot that occur over time in some people.  Hammertoes may be aggravated by shoes that don t fit properly. A hammertoe may result if a toe is too long and is forced into a cramped position when a tight shoe is worn.  Occasionally, hammertoe is the result of an earlier trauma to the toe. In some people, hammertoes are inherited.  SYMPTOMS  Pain or irritation of the affected toe when wearing shoes.   Corns and calluses (a buildup of skin) on the toe, between two toes, or on the ball of the foot. Corns are caused by constant friction against the shoe. They may be soft or hard, depending upon their location.   Inflammation, redness, or a burning sensation   Contracture of the toe   In more severe cases of hammertoe, open sores may form.   DIAGNOSIS  Although hammertoes are readily apparent, to arrive at a diagnosis the foot and ankle surgeon will obtain a thorough history of your symptoms and examine your foot. During the physical examination, the doctor may attempt to reproduce your symptoms by manipulating your foot and  will study the contractures of the toes. In addition, the foot and ankle surgeon may take x-rays to determine the degree of the deformities and assess any changes that may have occurred.   Hammertoes are progressive - they don t go away by themselves and usually they will get worse over time. However, not all cases are alike - some hammertoes progress more rapidly than others. Once your foot and ankle surgeon has evaluated your hammertoes, a treatment plan can be developed that is suited to your needs.  NON-SURGICAL TREATMENT  There is a variety of treatment options for hammertoe. The treatment your foot and ankle surgeon selects will depend upon the severity of your hammertoe and other factors.  A number of non-surgical measures can be undertaken:  Padding corns and calluses. Your foot and ankle surgeon can provide or prescribe pads designed to shield corns from irritation. If you want to try over-the-counter pads, avoid the medicated types. Medicated pads are generally not recommended because they may contain a small amount of acid that can be harmful. Consult your surgeon about this option.   Changes in shoewear. Avoid shoes with pointed toes, shoes that are too short, or shoes with high heels - conditions that can force your toe against the front of the shoe. Instead, choose comfortable shoes with a deep, roomy toe box and heels no higher than two inches.   Orthotic devices. A custom orthotic device placed in your shoe may help control the muscle/tendon imbalance.   Injection therapy. Corticosteroid injections are sometimes used to ease pain and inflammation caused by hammertoe.   Medications. Oral nonsteroidal anti-inflammatory drugs (NSAIDs), such as ibuprofen, may be recommended to reduce pain and inflammation.   Splinting/strapping. Splints or small straps may be applied by the surgeon to realign the bent toe.   Exercises:   1. The Toe Tap  Stand flat on the ground in your bare feet. Raise all of your toes  up off the ground as high as you can. Then starting with the little toes, slowly press them down to the ground. After the big toes are on the ground, start over by raising all of them up off the ground again. This motion is similar to tapping your fingers on a desk. Repeat this ten times.     2. Interlocking your Fingers and Toes  Cross your right foot over your knee and place the fingers of your left hand between your toes. Squeeze your toes together, pinching your fingers between them. Spread the toes apart and squeeze them together again. Repeat this ten times then do the other foot. Like most exercises, this will get easier the more you do it. If you are having a lot of difficulty with this exercise, start with just your index finger between your first and second toe, then later add your middle finger between your second and third toes, and so on until you can fit all your fingers between your toes. Do this ten times on each foot. Eventually you will be able to spread your toes apart without using your fingers.    3. Gripping the Floor   the floor by pressing the pads of your toes (not the tips) into the floor without curling your toes. Relax and repeat this ten times. If your shoes have the proper amount of depth, you should be able to do this with shoes on.    HAMMERTOE TOE SURGERY   Hammertoe surgery is complex. The surgical procedure is an attempt to help the toe lay in a better position. Nearly every structure in the toe will be cut including the tendons, ligaments, skin and bone. Hammertoes are a complex deformity and final toe position is difficult to predict. The only sure way to position a toe is to fuse all three digital joints. That will not happen as some degree of toe motion is needed for walking. The toe may not be completely reduced as the surrounding skin and other structures may not allow the toe to return to a normal position. The tendons on adjacent toes may need to be cut at the time of  the original or subsequent surgeries, as interconnections exist between the toes. The toe may drift after surgery. Stiffness may develop leading to new areas of pressure.   Future shoe choices will be critical in allowing the surgery to provide comfort. The toes will still hurt if shoes rub. The original pain may also persist as other foot problems may be contributing to the current pain. The toe may or may not be pinned in place. External pins would require complete avoidance of water on the foot for six weeks. The pin would be removed about six weeks after the surgery. Strict attention to protection is critical. The pin could get bumped or loosen resulting in early removal. Removal might be necessary before the bone heals which would negatively affect the final surgical outcome and toe allignment.       POTENTIAL COMPLICATIONS OF FOOT & ANKLE SURGERY  Undergoing a surgical procedure involves a certain amount of risk. Risks of complications vary depending on the complexity of the surgery and how you take care of the surgical area during the healing process. Complications can range from minor infection to death. Some complications are temporary while others will be permanent.  Your surgeon weighs the risk of complications vs the potential benefit of undergoing surgery. You need to consider your tolerance for unexpected problems as you decide whether to undergo surgery.    Foot and Ankle surgery involves cutting skin, bone, ligaments, blood vessels and joints.  These structures heal well but not without consequence. Any break to the skin can lead to infection. A deep infection involves bones or joints which can be devastating. Deep infection can lead to amputation or could spread to other parts of your body. Most infections are minor and easily treated with oral antibiotics. Infections are often times from bacteria already present on your skin. Proper care of the surgical site is an essential component of avoiding  infection. Do not get the bandage wet and take proper care of external pins to avoid these problems.     Joint stiffness is inherent to any foot or ankle surgery. Joint surgery is a major component of reconstructive foot and ankle procedures. The ligaments and tissues around the joint are cut, and later repaired. Scare tissue limits joint mobility. This can be permanent but generally improves over the course of one year.    Surgery involves dissection around nerves. Visible nerves are moved out of the way while very small nerves are cut. Scar tissue develops around nerves and can lead to nerve pain, numbness, or neuromas. Nerve symptoms can be permanent. This can lead to numbness or sometimes hypersensitivity to touch and problems wearing shoes.    Bones do not always heal after surgery. Poor healing after a bone cut or joint fusion can lead to an extended period of casting or repeat surgery. Electronic bone stimulators are sometimes used to stimulate poor healing of bone. Nonunion is when joint fusion does not take.  This can occur as often as 10% of the time. Smoking doubles your risk of poor bone healing to 20%.    Bone grafting is sometimes necessary during the original or subsequent surgery. Bone is sometimes taken from other parts of your body or freeze dried bone from a bone bank from a bone bank or synthetic bone material might be used.    A scar is always present after foot and ankle surgery. The scar will be visible and could be sensitive. Some people develop excessive scarring, which cannot be controlled by the surgeon. Scars can be unsightly and can restrict joint mobility.    Blood clots can develop in the calf after surgery. Foot and ankle surgery is a predisposing factor for blood clots. The blood clot could break and travel to your lung.  This condition can lead to death. Early warning signs could include calf swelling and pain, chest pain or shortness of breath. This is an emergency that requires  immediate attention by a medical doctor!    Surgery will not necessarily create a pain-free foot. Even normal feet hurt. Crooked toes, bunions, neuromas, flat feet and arthritis should all be considered permanent conditions.  Ankle pain commonly requires multiple surgeries over a lifetime. Do not assume that having surgery will permanently fix your condition. You may need permanent alteration in shoes and activities to accommodate your foot and ankle problem.    Careful attention to post-operative recommendations will dramatically reduce your risk of complications. Proper dressing, wound care, elevation and rest will be essential to get the wound healed and minimize scarring. Strict attention to activity restrictions, such as non-weight bearing, or partial weight bearing is essential. Internal fixation devices may not resist the stress of walking. Some select surgeries allow the patient to walk, however this should be very minimal.    Despite these concerns, foot and ankle surgery leads to a high level of patient satisfaction. Your surgeon would not recommend surgery if he/she did not expect your foot to improve. Talk to your surgeon about any of the above issues.    HAMMERTOE PADS / TOE SPLINTS              Follow-ups after your visit        Your next 10 appointments already scheduled     May 29, 2018  9:30 AM CDT   Lab with Infusion with RH LAB DRAW 1   [facility] ([facility])    [facility]  [address]  [facility] [address]   [phone]            May 29, 2018  9:45 AM CDT   Return Visit with Jose Summers MD   [facility] ([facility])    [facility]  [address]  [facility] [address]   [phone]            May 29, 2018 10:30 AM CDT   Level 4 with RH INFUSION CHAIR 2   [facility]fusion [facility] ([facility]    [facility]  [address]  [place] [address]   [phone]            Jul 10, 2018 10:00 AM CDT   Level 5 with RH INFUSION CHAIR 1   [facility] Infusion [facility] ([facility])    [facility]  [address]  [place] [address]   [phone]            Aug 21, 2018 10:00 AM CDT   Level 5 with RH INFUSION CHAIR 2   [facility] Infusion [facility] ([facility])    [facility]  [address]  [place] [address]   [phone]              Who to contact     If you have questions or need follow up information about today's clinic visit or your schedule please contact [facility] directly at [phone].  Normal or non-critical lab and imaging results will be communicated to you by MyChart, letter or phone within 4 business days after the clinic has received the results. If you do not hear from us within 7 days, please contact the clinic through [facility] or phone. If you have a critical or abnormal lab result, we will notify you by phone as soon as possible.  Submit refill requests through [facility] or call your pharmacy and they will forward the refill request to us. Please allow 3 business days for your refill to be completed.          Additional Information About Your Visit        [facility] Information     [facility] gives you secure access to your electronic health record. If you see a primary care provider, you can also send messages to your care team and make appointments. If you have questions, please call your primary care clinic.  If you do not have a primary care provider, please call [phone] and they will assist you.        Care EveryWhere ID     This is your Care EveryWhere ID. This could be used by other organizations to access your [facility] medical records  [id number]        Your Vitals Were     Height BMI  "(Body Mass Index)                6' 1\" (1.854 m) 20.45 kg/m2           Blood Pressure from Last 3 Encounters:   05/23/18 120/76   05/17/18 134/62   04/17/18 123/53    Weight from Last 3 Encounters:   05/23/18 155 lb (70.3 kg)   05/17/18 156 lb (70.8 kg)   04/17/18 158 lb (71.7 kg)              Today, you had the following     No orders found for display       Primary Care Provider Office Phone # Fax #    Monika Whipple, APRN [facility] [phone] [address] [facility] [address]        Equal Access to Services     ZIA HENRIQUEZ : Hadii carlin posto Socarlos, waaxda luqadaha, qaybta kaalmada adeegyada, naya ackerman . So [facility] [phone].    ATENCIÓN: Si habla español, tiene a dial disposición servicios gratuitos de asistencia lingüística. Llame al [phone].    We comply with applicable federal civil rights laws and [place] laws. We do not discriminate on the basis of race, color, national origin, age, disability, sex, sexual orientation, or gender identity.            Thank you!     Thank you for choosing [facility]  for your care. Our goal is always to provide you with excellent care. Hearing back from our patients is one way we can continue to improve our services. Please take a few minutes to complete the written survey that you may receive in the mail after your visit with us. Thank you!             Your Updated Medication List - Protect others around you: Learn how to safely use, store and throw away your medicines at www.disposemymeds.org.          This list is accurate as of 5/23/18 10:34 AM.  Always use your most recent med list.                   Brand Name Dispense Instructions for use Diagnosis    cyanocobalamin 1000 MCG tablet    vitamin  B-12      Anemia, Back pain       Fluocinolone Acetonide Scalp 0.01 % Oil oil           folic acid 1 MG tablet    FOLVITE          ketoconazole 2 % shampoo    NIZORAL          levothyroxine 150 MCG " tablet    SYNTHROID/LEVOTHROID    90 tablet    TAKE 1 TABLET BY MOUTH ONCE DAILY    Hypothyroidism, unspecified type       sulfamethoxazole-trimethoprim 800-160 MG per tablet    BACTRIM DS/SEPTRA DS    45 tablet    Take 1 pill orally on Mon, Wed and Friday    CVID (common variable immunodeficiency) (H), Other autoimmune hemolytic anemias (H), B12 deficiency

## 2018-05-23 NOTE — PROGRESS NOTES
PATIENT HISTORY:   Tricia Sosa is a 77 year old female who presents to clinic for assessment of toes. Notes that the right ones sometimes rub in the shoes but not painful. Is wondering about surgery for them.     Review of Systems:  Patient denies fever, chills, rash, wound, stiffness, limping, numbness, weakness, heart burn, blood in stool, chest pain with activity, calf pain when walking, shortness of breath with activity, chronic cough, easy bleeding/bruising, swelling of ankles, excessive thirst, fatigue, depression, anxiety.      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, and posterior vitreous detachment     Urinary tract infection, site not specified     Recurrent UTI's        PAST SURGICAL HISTORY:   Past Surgical History:   Procedure Laterality Date     C LIGATE FALLOPIAN TUBE       C THYROIDECTOMY       COLONOSCOPY N/A 4/26/2016    Procedure: COLONOSCOPY;  Surgeon: Dontae Del Rio MD;  Location:  GI      COLONOSCOPY W BIOPSY  4/26/00     HC COLONOSCOPY W BIOPSY  10/8/03     HC COLONOSCOPY W BIOPSY  6/27/05    REPEAT IN 5 YEARS     HC CYSTOSCOPY,INSERT URETERAL STENT      Ureteral stent insertion      FLEX SIGMOIDOSCOPY W BIOPSY  5/30/00     HC LAPAROSCOPY, SURGICAL; CHOLECYSTECTOMY  1992     LIGATION OF HEMORRHOID(S)      Hemorrhoidectomy     UPPER GI ENDOSCOPY,BIOPSY  10/01/01        MEDICATIONS:   Current Outpatient Prescriptions:      cyanocobalamin (VITAMIN  B-12) 1000 MCG tablet, , Disp: , Rfl: 3     FLUOCINOLONE ACETONIDE SCALP 0.01 % OIL oil, , Disp: , Rfl:      folic acid (FOLVITE) 1 MG tablet, , Disp: , Rfl: 3     ketoconazole (NIZORAL) 2 % shampoo, , Disp: , Rfl: 11     levothyroxine (SYNTHROID/LEVOTHROID) 150 MCG tablet, TAKE  "1 TABLET BY MOUTH ONCE DAILY, Disp: 90 tablet, Rfl: 1     sulfamethoxazole-trimethoprim (BACTRIM DS/SEPTRA DS) 800-160 MG per tablet, Take 1 pill orally on Mon, Wed and Friday, Disp: 45 tablet, Rfl: 3     ALLERGIES:    Allergies   Allergen Reactions     Doxycycline         SOCIAL HISTORY:   Social History     Social History     Marital status:      Spouse name: N/A     Number of children: N/A     Years of education: N/A     Occupational History     Not on file.     Social History Main Topics     Smoking status: Never Smoker     Smokeless tobacco: Never Used     Alcohol use No     Drug use: No     Sexual activity: No     Other Topics Concern     Not on file     Social History Narrative        FAMILY HISTORY:   Family History   Problem Relation Age of Onset     CANCER Mother       of colon cancer at age 90     CEREBROVASCULAR DISEASE Father       at age 85     Dementia Brother      Dementia Brother      Dementia Brother      pancreatic cancer     Colon Cancer No family hx of         EXAM:Vitals: /76  Ht 6' 1\" (1.854 m)  Wt 155 lb (70.3 kg)  BMI 20.45 kg/m2  BMI= Body mass index is 20.45 kg/(m^2).    General appearance: Patient is alert and fully cooperative with history & exam.  No sign of distress is noted during the visit.     Psychiatric: Affect is pleasant & appropriate.  Patient appears motivated to improve health.     Respiratory: Breathing is regular & unlabored while sitting.     HEENT: Hearing is intact to spoken word.  Speech is clear.  No gross evidence of visual impairment that would impact ambulation.     Dermatologic: Skin is intact to both lower extremities without significant lesions, rash or abrasion.  No paronychia or evidence of soft tissue infection is noted.     Vascular: DP & PT pulses are intact & regular bilaterally.  No significant edema or varicosities noted.  CFT and skin temperature is normal to both lower extremities.     Neurologic: Lower extremity sensation is " intact to light touch.  No evidence of weakness or contracture in the lower extremities.  No evidence of neuropathy.     Musculoskeletal: Patient is ambulatory without assistive device or brace. Increase arch height. Lateral deviation of the great toes bilateral with prominent 1st metatarsal heads. Semi rigid contractures of toes 2-5 bilateral.      ASSESSMENT:    Hammer toes, bilateral  Hallux valgus of right foot  Hallux valgus of left foot  Pes cavus     PLAN:  Reviewed patient's chart in [facility]. Reviewed and discussed causes of hammertoes with patient.  Explained that this can be caused by an overpowering of muscles or by the way we walk.  Discussed conservative treatments such as orthotics, pads, shoe gear.  Explained that sometimes the flexor tendons can be cut to try and straighten the toe and reduce rubbing. This is normally done in office and patient is weight bearing in postop she for 1-2 weeks.  We also discussed surgical intervention to remove the joint and possibly fuse the toe.  Normally patient has a pin sticking out of the toe for about 6 weeks and can not get the foot wet. Patient would have to be minimal weight bearing in cam boot.      She is not interested in surgery. Recommend toe splints.        Mariela Serna DPM, Podiatry/Foot and Ankle Surgery        Patient to follow up with Primary Care provider regarding elevated blood pressure.

## 2018-05-23 NOTE — PATIENT INSTRUCTIONS
Thank you for choosing [facility]!    DR. SCHMITZ'S CLINIC SCHEDULE  MONDAY AM - [place] TUESDAY - [place]   [address] [address] [address] [address] [address] [place] [address]   [phone] / [phone] / [phone]       WEDNESDAY - [place] FRIDAY AM - [facility]   [address]   [place] [address] [place] [address]   [phone] / [phone]       FRIDAY PM - [place] SCHEDULE SURGERY: [phone]   [address] BILLING QUESTIONS: [phone]   [place] [address] AFTER HOURS: [phone]   [phone] / [phone] APPOINTMENTS: [phone]     Consumer Price Line (CPL) [phone]           Body Mass Index (BMI)  Many things can cause foot and ankle problems. Foot structure, activity level, foot mechanics and injuries are common causes of pain.  One very important issue that often goes unmentioned, is body weight. Extra weight can cause increased stress on muscles, ligaments, bones and tendons.  Sometimes just a few extra pounds is all it takes to put one over her/his threshold. Without reducing that stress, it can be difficult to alleviate pain. Some people are uncomfortable addressing this issue, but we feel it is important for you to think about it. As Foot &  Ankle specialists, our job is addressing the lower extremity problem and possible causes. Regarding extra body weight, we encourage patients to discuss diet and weight management plans with their primary care doctors. It is this team approach that gives you the best opportunity for pain relief and getting you back on your feet.          HAMMERTOES  Hammertoe is a contracture (bending) of one or both joints of the second, third, fourth, or fifth (little) toes. This abnormal bending can put pressure on the toe when wearing shoes, causing problems to develop.  Hammertoes usually start out as mild deformities and get  progressively worse over time. In the earlier stages, hammertoes are flexible and the symptoms can often be managed with noninvasive measures. But if left untreated, hammertoes can become more rigid and will not respond to non-surgical treatment.  Because of the progressive nature of hammertoes, they should receive early attention. Hammertoes never get better without some kind of intervention.  CAUSES  The most common cause of hammertoe is a muscle/tendon imbalance. This imbalance, which leads to a bending of the toe, results from mechanical (structural) changes in the foot that occur over time in some people.  Hammertoes may be aggravated by shoes that don t fit properly. A hammertoe may result if a toe is too long and is forced into a cramped position when a tight shoe is worn.  Occasionally, hammertoe is the result of an earlier trauma to the toe. In some people, hammertoes are inherited.  SYMPTOMS  Pain or irritation of the affected toe when wearing shoes.   Corns and calluses (a buildup of skin) on the toe, between two toes, or on the ball of the foot. Corns are caused by constant friction against the shoe. They may be soft or hard, depending upon their location.   Inflammation, redness, or a burning sensation   Contracture of the toe   In more severe cases of hammertoe, open sores may form.   DIAGNOSIS  Although hammertoes are readily apparent, to arrive at a diagnosis the foot and ankle surgeon will obtain a thorough history of your symptoms and examine your foot. During the physical examination, the doctor may attempt to reproduce your symptoms by manipulating your foot and will study the contractures of the toes. In addition, the foot and ankle surgeon may take x-rays to determine the degree of the deformities and assess any changes that may have occurred.   Hammertoes are progressive - they don t go away by themselves and usually they will get worse over time. However, not all cases are alike - some  hammertoes progress more rapidly than others. Once your foot and ankle surgeon has evaluated your hammertoes, a treatment plan can be developed that is suited to your needs.  NON-SURGICAL TREATMENT  There is a variety of treatment options for hammertoe. The treatment your foot and ankle surgeon selects will depend upon the severity of your hammertoe and other factors.  A number of non-surgical measures can be undertaken:  Padding corns and calluses. Your foot and ankle surgeon can provide or prescribe pads designed to shield corns from irritation. If you want to try over-the-counter pads, avoid the medicated types. Medicated pads are generally not recommended because they may contain a small amount of acid that can be harmful. Consult your surgeon about this option.   Changes in shoewear. Avoid shoes with pointed toes, shoes that are too short, or shoes with high heels - conditions that can force your toe against the front of the shoe. Instead, choose comfortable shoes with a deep, roomy toe box and heels no higher than two inches.   Orthotic devices. A custom orthotic device placed in your shoe may help control the muscle/tendon imbalance.   Injection therapy. Corticosteroid injections are sometimes used to ease pain and inflammation caused by hammertoe.   Medications. Oral nonsteroidal anti-inflammatory drugs (NSAIDs), such as ibuprofen, may be recommended to reduce pain and inflammation.   Splinting/strapping. Splints or small straps may be applied by the surgeon to realign the bent toe.   Exercises:   1. The Toe Tap  Stand flat on the ground in your bare feet. Raise all of your toes up off the ground as high as you can. Then starting with the little toes, slowly press them down to the ground. After the big toes are on the ground, start over by raising all of them up off the ground again. This motion is similar to tapping your fingers on a desk. Repeat this ten times.     2. Interlocking your Fingers and  Toes  Cross your right foot over your knee and place the fingers of your left hand between your toes. Squeeze your toes together, pinching your fingers between them. Spread the toes apart and squeeze them together again. Repeat this ten times then do the other foot. Like most exercises, this will get easier the more you do it. If you are having a lot of difficulty with this exercise, start with just your index finger between your first and second toe, then later add your middle finger between your second and third toes, and so on until you can fit all your fingers between your toes. Do this ten times on each foot. Eventually you will be able to spread your toes apart without using your fingers.    3. Gripping the Floor   the floor by pressing the pads of your toes (not the tips) into the floor without curling your toes. Relax and repeat this ten times. If your shoes have the proper amount of depth, you should be able to do this with shoes on.    HAMMERTOE TOE SURGERY   Hammertoe surgery is complex. The surgical procedure is an attempt to help the toe lay in a better position. Nearly every structure in the toe will be cut including the tendons, ligaments, skin and bone. Hammertoes are a complex deformity and final toe position is difficult to predict. The only sure way to position a toe is to fuse all three digital joints. That will not happen as some degree of toe motion is needed for walking. The toe may not be completely reduced as the surrounding skin and other structures may not allow the toe to return to a normal position. The tendons on adjacent toes may need to be cut at the time of the original or subsequent surgeries, as interconnections exist between the toes. The toe may drift after surgery. Stiffness may develop leading to new areas of pressure.   Future shoe choices will be critical in allowing the surgery to provide comfort. The toes will still hurt if shoes rub. The original pain may also persist  as other foot problems may be contributing to the current pain. The toe may or may not be pinned in place. External pins would require complete avoidance of water on the foot for six weeks. The pin would be removed about six weeks after the surgery. Strict attention to protection is critical. The pin could get bumped or loosen resulting in early removal. Removal might be necessary before the bone heals which would negatively affect the final surgical outcome and toe allignment.       POTENTIAL COMPLICATIONS OF FOOT & ANKLE SURGERY  Undergoing a surgical procedure involves a certain amount of risk. Risks of complications vary depending on the complexity of the surgery and how you take care of the surgical area during the healing process. Complications can range from minor infection to death. Some complications are temporary while others will be permanent.  Your surgeon weighs the risk of complications vs the potential benefit of undergoing surgery. You need to consider your tolerance for unexpected problems as you decide whether to undergo surgery.    Foot and Ankle surgery involves cutting skin, bone, ligaments, blood vessels and joints.  These structures heal well but not without consequence. Any break to the skin can lead to infection. A deep infection involves bones or joints which can be devastating. Deep infection can lead to amputation or could spread to other parts of your body. Most infections are minor and easily treated with oral antibiotics. Infections are often times from bacteria already present on your skin. Proper care of the surgical site is an essential component of avoiding infection. Do not get the bandage wet and take proper care of external pins to avoid these problems.     Joint stiffness is inherent to any foot or ankle surgery. Joint surgery is a major component of reconstructive foot and ankle procedures. The ligaments and tissues around the joint are cut, and later repaired. Scare tissue  limits joint mobility. This can be permanent but generally improves over the course of one year.    Surgery involves dissection around nerves. Visible nerves are moved out of the way while very small nerves are cut. Scar tissue develops around nerves and can lead to nerve pain, numbness, or neuromas. Nerve symptoms can be permanent. This can lead to numbness or sometimes hypersensitivity to touch and problems wearing shoes.    Bones do not always heal after surgery. Poor healing after a bone cut or joint fusion can lead to an extended period of casting or repeat surgery. Electronic bone stimulators are sometimes used to stimulate poor healing of bone. Nonunion is when joint fusion does not take.  This can occur as often as 10% of the time. Smoking doubles your risk of poor bone healing to 20%.    Bone grafting is sometimes necessary during the original or subsequent surgery. Bone is sometimes taken from other parts of your body or freeze dried bone from a bone bank from a bone bank or synthetic bone material might be used.    A scar is always present after foot and ankle surgery. The scar will be visible and could be sensitive. Some people develop excessive scarring, which cannot be controlled by the surgeon. Scars can be unsightly and can restrict joint mobility.    Blood clots can develop in the calf after surgery. Foot and ankle surgery is a predisposing factor for blood clots. The blood clot could break and travel to your lung.  This condition can lead to death. Early warning signs could include calf swelling and pain, chest pain or shortness of breath. This is an emergency that requires immediate attention by a medical doctor!    Surgery will not necessarily create a pain-free foot. Even normal feet hurt. Crooked toes, bunions, neuromas, flat feet and arthritis should all be considered permanent conditions.  Ankle pain commonly requires multiple surgeries over a lifetime. Do not assume that having surgery will  permanently fix your condition. You may need permanent alteration in shoes and activities to accommodate your foot and ankle problem.    Careful attention to post-operative recommendations will dramatically reduce your risk of complications. Proper dressing, wound care, elevation and rest will be essential to get the wound healed and minimize scarring. Strict attention to activity restrictions, such as non-weight bearing, or partial weight bearing is essential. Internal fixation devices may not resist the stress of walking. Some select surgeries allow the patient to walk, however this should be very minimal.    Despite these concerns, foot and ankle surgery leads to a high level of patient satisfaction. Your surgeon would not recommend surgery if he/she did not expect your foot to improve. Talk to your surgeon about any of the above issues.    HAMMERTOE PADS / TOE SPLINTS

## 2018-05-23 NOTE — LETTER
5/23/2018         RE: Tricia Sosa  [address]  [facility] [address]        Dear Colleague,    Thank you for referring your patient, Tricia Sosa, to the [facility]. Please see a copy of my visit note below.    PATIENT HISTORY:   Tricia Sosa is a 77 year old female who presents to clinic for assessment of toes. Notes that the right ones sometimes rub in the shoes but not painful. Is wondering about surgery for them.     Review of Systems:  Patient denies fever, chills, rash, wound, stiffness, limping, numbness, weakness, heart burn, blood in stool, chest pain with activity, calf pain when walking, shortness of breath with activity, chronic cough, easy bleeding/bruising, swelling of ankles, excessive thirst, fatigue, depression, anxiety.      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, and posterior vitreous detachment     Urinary tract infection, site not specified     Recurrent UTI's        PAST SURGICAL HISTORY:   Past Surgical History:   Procedure Laterality Date     C LIGATE FALLOPIAN TUBE       C THYROIDECTOMY       COLONOSCOPY N/A 4/26/2016    Procedure: COLONOSCOPY;  Surgeon: Dontae Del Rio MD;  Location:  GI      COLONOSCOPY W BIOPSY  4/26/00     HC COLONOSCOPY W BIOPSY  10/8/03     HC COLONOSCOPY W BIOPSY  6/27/05    REPEAT IN 5 YEARS     HC CYSTOSCOPY,INSERT URETERAL STENT      Ureteral stent insertion     HC FLEX SIGMOIDOSCOPY W BIOPSY  5/30/00      LAPAROSCOPY, SURGICAL; CHOLECYSTECTOMY  1992     LIGATION OF HEMORRHOID(S)      Hemorrhoidectomy     UPPER GI ENDOSCOPY,BIOPSY  10/01/01        MEDICATIONS:   Current Outpatient Prescriptions:      cyanocobalamin (VITAMIN  B-12) 1000 MCG tablet, , Disp: ,  "Rfl: 3     FLUOCINOLONE ACETONIDE SCALP 0.01 % OIL oil, , Disp: , Rfl:      folic acid (FOLVITE) 1 MG tablet, , Disp: , Rfl: 3     ketoconazole (NIZORAL) 2 % shampoo, , Disp: , Rfl: 11     levothyroxine (SYNTHROID/LEVOTHROID) 150 MCG tablet, TAKE 1 TABLET BY MOUTH ONCE DAILY, Disp: 90 tablet, Rfl: 1     sulfamethoxazole-trimethoprim (BACTRIM DS/SEPTRA DS) 800-160 MG per tablet, Take 1 pill orally on Mon, Wed and Friday, Disp: 45 tablet, Rfl: 3     ALLERGIES:    Allergies   Allergen Reactions     Doxycycline         SOCIAL HISTORY:   Social History     Social History     Marital status:      Spouse name: N/A     Number of children: N/A     Years of education: N/A     Occupational History     Not on file.     Social History Main Topics     Smoking status: Never Smoker     Smokeless tobacco: Never Used     Alcohol use No     Drug use: No     Sexual activity: No     Other Topics Concern     Not on file     Social History Narrative        FAMILY HISTORY:   Family History   Problem Relation Age of Onset     CANCER Mother       of colon cancer at age 90     CEREBROVASCULAR DISEASE Father       at age 85     Dementia Brother      Dementia Brother      Dementia Brother      pancreatic cancer     Colon Cancer No family hx of         EXAM:Vitals: /76  Ht 6' 1\" (1.854 m)  Wt 155 lb (70.3 kg)  BMI 20.45 kg/m2  BMI= Body mass index is 20.45 kg/(m^2).    General appearance: Patient is alert and fully cooperative with history & exam.  No sign of distress is noted during the visit.     Psychiatric: Affect is pleasant & appropriate.  Patient appears motivated to improve health.     Respiratory: Breathing is regular & unlabored while sitting.     HEENT: Hearing is intact to spoken word.  Speech is clear.  No gross evidence of visual impairment that would impact ambulation.     Dermatologic: Skin is intact to both lower extremities without significant lesions, rash or abrasion.  No paronychia or evidence of soft " tissue infection is noted.     Vascular: DP & PT pulses are intact & regular bilaterally.  No significant edema or varicosities noted.  CFT and skin temperature is normal to both lower extremities.     Neurologic: Lower extremity sensation is intact to light touch.  No evidence of weakness or contracture in the lower extremities.  No evidence of neuropathy.     Musculoskeletal: Patient is ambulatory without assistive device or brace. Increase arch height. Lateral deviation of the great toes bilateral with prominent 1st metatarsal heads. Semi rigid contractures of toes 2-5 bilateral.      ASSESSMENT:    Hammer toes, bilateral  Hallux valgus of right foot  Hallux valgus of left foot  Pes cavus     PLAN:  Reviewed patient's chart in [facility]. Reviewed and discussed causes of hammertoes with patient.  Explained that this can be caused by an overpowering of muscles or by the way we walk.  Discussed conservative treatments such as orthotics, pads, shoe gear.  Explained that sometimes the flexor tendons can be cut to try and straighten the toe and reduce rubbing. This is normally done in office and patient is weight bearing in postop she for 1-2 weeks.  We also discussed surgical intervention to remove the joint and possibly fuse the toe.  Normally patient has a pin sticking out of the toe for about 6 weeks and can not get the foot wet. Patient would have to be minimal weight bearing in cam boot.      She is not interested in surgery. Recommend toe splints.        Mariela Serna DPM, Podiatry/Foot and Ankle Surgery        Patient to follow up with Primary Care provider regarding elevated blood pressure.        Again, thank you for allowing me to participate in the care of your patient.        Sincerely,        Mariela Serna DPM, Podiatry/Foot and Ankle Surgery

## 2018-05-24 ENCOUNTER — MYC MEDICAL ADVICE (OUTPATIENT)
Dept: PODIATRY | Facility: CLINIC | Age: 78
End: 2018-05-24

## 2018-05-24 LAB
ALBUMIN SERPL-MCNC: 3.9 G/DL (ref 3.4–5)
ALP SERPL-CCNC: 113 U/L (ref 40–150)
ALT SERPL W P-5'-P-CCNC: 31 U/L (ref 0–50)
ANION GAP SERPL CALCULATED.3IONS-SCNC: 7 MMOL/L (ref 3–14)
AST SERPL W P-5'-P-CCNC: 42 U/L (ref 0–45)
BILIRUB SERPL-MCNC: 0.8 MG/DL (ref 0.2–1.3)
BUN SERPL-MCNC: 13 MG/DL (ref 7–30)
CALCIUM SERPL-MCNC: 8.4 MG/DL (ref 8.5–10.1)
CHLORIDE SERPL-SCNC: 107 MMOL/L (ref 94–109)
CO2 SERPL-SCNC: 26 MMOL/L (ref 20–32)
CREAT SERPL-MCNC: 0.69 MG/DL (ref 0.52–1.04)
FERRITIN SERPL-MCNC: 12 NG/ML (ref 8–252)
GFR SERPL CREATININE-BSD FRML MDRD: 82 ML/MIN/1.7M2
GLUCOSE SERPL-MCNC: 105 MG/DL (ref 70–99)
HAPTOGLOB SERPL-MCNC: <6 MG/DL (ref 35–175)
IGG SERPL-MCNC: 719 MG/DL (ref 695–1620)
IGM SERPL-MCNC: 58 MG/DL (ref 60–265)
IRON SATN MFR SERPL: 19 % (ref 15–46)
IRON SERPL-MCNC: 67 UG/DL (ref 35–180)
POTASSIUM SERPL-SCNC: 4.2 MMOL/L (ref 3.4–5.3)
PROT SERPL-MCNC: 6.5 G/DL (ref 6.8–8.8)
SODIUM SERPL-SCNC: 140 MMOL/L (ref 133–144)
TIBC SERPL-MCNC: 356 UG/DL (ref 240–430)

## 2018-05-24 NOTE — TELEPHONE ENCOUNTER
Please see [employer] message regarding recommendations for estuardo.     Routing to Dr. Serna.     NIK Christiansen RN

## 2018-05-28 ENCOUNTER — MYC MEDICAL ADVICE (OUTPATIENT)
Dept: PODIATRY | Facility: CLINIC | Age: 78
End: 2018-05-28

## 2018-05-29 ENCOUNTER — INFUSION THERAPY VISIT (OUTPATIENT)
Dept: INFUSION THERAPY | Facility: CLINIC | Age: 78
End: 2018-05-29
Attending: INTERNAL MEDICINE
Payer: MEDICARE

## 2018-05-29 ENCOUNTER — ONCOLOGY VISIT (OUTPATIENT)
Dept: ONCOLOGY | Facility: CLINIC | Age: 78
End: 2018-05-29
Attending: INTERNAL MEDICINE
Payer: MEDICARE

## 2018-05-29 VITALS
SYSTOLIC BLOOD PRESSURE: 120 MMHG | BODY MASS INDEX: 20.97 KG/M2 | DIASTOLIC BLOOD PRESSURE: 64 MMHG | HEIGHT: 72 IN | RESPIRATION RATE: 16 BRPM | WEIGHT: 154.8 LBS | OXYGEN SATURATION: 95 % | TEMPERATURE: 97.8 F | HEART RATE: 76 BPM

## 2018-05-29 DIAGNOSIS — D59.19 OTHER AUTOIMMUNE HEMOLYTIC ANEMIAS: ICD-10-CM

## 2018-05-29 DIAGNOSIS — D83.9 CVID (COMMON VARIABLE IMMUNODEFICIENCY) (H): Primary | ICD-10-CM

## 2018-05-29 DIAGNOSIS — E53.8 B12 DEFICIENCY: ICD-10-CM

## 2018-05-29 PROCEDURE — 99214 OFFICE O/P EST MOD 30 MIN: CPT | Performed by: INTERNAL MEDICINE

## 2018-05-29 PROCEDURE — 96365 THER/PROPH/DIAG IV INF INIT: CPT

## 2018-05-29 PROCEDURE — 25000128 H RX IP 250 OP 636: Performed by: INTERNAL MEDICINE

## 2018-05-29 PROCEDURE — 96375 TX/PRO/DX INJ NEW DRUG ADDON: CPT

## 2018-05-29 PROCEDURE — 96366 THER/PROPH/DIAG IV INF ADDON: CPT

## 2018-05-29 PROCEDURE — G0463 HOSPITAL OUTPT CLINIC VISIT: HCPCS | Mod: 25

## 2018-05-29 RX ORDER — DIPHENHYDRAMINE HCL 25 MG
25 CAPSULE ORAL
Status: CANCELLED | OUTPATIENT
Start: 2018-05-29

## 2018-05-29 RX ORDER — ACETAMINOPHEN 325 MG/1
650 TABLET ORAL
Status: CANCELLED
Start: 2018-05-29

## 2018-05-29 RX ADMIN — HUMAN IMMUNOGLOBULIN G 35 G: 20 LIQUID INTRAVENOUS at 11:18

## 2018-05-29 RX ADMIN — HYDROCORTISONE SODIUM SUCCINATE 100 MG: 100 INJECTION, POWDER, FOR SOLUTION INTRAMUSCULAR; INTRAVENOUS at 10:53

## 2018-05-29 ASSESSMENT — PAIN SCALES - GENERAL: PAINLEVEL: NO PAIN (0)

## 2018-05-29 NOTE — PATIENT INSTRUCTIONS
IV Ig  infusions today and every 6 weeks - Scheduled/ Iwona Garcia      Follow up in 12 weeks with labs prior to visit to see me- Scheduled for labs/MD visit/IVIG on August 21 starting at 9:00.  Iwona Garcia      CBC w Diff, CMP and LDH; B12, retic, ferritin, iron panel, Ig subsets, haptoglobin

## 2018-05-29 NOTE — LETTER
5/29/2018         RE: Tricia Sosa  [address]  [place] [address]        Dear Colleague,    Thank you for referring your patient, Tricia Sosa, to the [facility] CANCER CARE. Please see a copy of my visit note below.    [facility] CANCER CLINIC  FOLLOW-UP VISIT NOTE    PATIENT NAME: Tricia Sosa MRN # [id number]  DATE OF VISIT: May 29, 2018 YOB: 1940    REFERRING PROVIDER: No referring provider defined for this encounter.    DIAGNOSIS: Hemolytic anemia-autoimmune; IgA deficiency    TREATMENT SUMMARY:  Tricia has been diagnosed with IgA deficiency since her around 2000.  She was very sick at the time and had severe diarrhea.  She lost about 40 pounds in weight.  The workup revealed that she had markedly diminished CD4 counts of 48 and was diagnosed with CMV colitis.  Since then she has been followed in hematology clinic at [place] until recently.  She was noted to have anemia which was fairly long-standing.  She was initially referred for anemia in 2004 and also had a bone marrow biopsy done at that time which revealed hypercellular bone marrow with 70-80% cellularity and normal trilineage hematopoiesis and no morphologic features of MDS or lymphoproliferation.  Her anemia was attributed to her chronic underlying disease.  At the next evaluation in 2002 on 4 aggressive anemia there was no evidence of hemolysis, iron deficiency, B12 or folate deficiency.  Bone marrow biopsy was not pursued.  In summer of 2015 she had progressive fatigue, dyspnea on exertion and worsening anemia with a hemoglobin now of 9.  Workup at this time did suggest a hemolytic anemia with elevated LDH at 709, undetectable haptoglobin and elevated unconjugated bilirubin at 3.3.  Monospecific MARIKA was positive for both IgG and complement.  Cold agglutinin screen was positive with very low titer 1:64.  She was started on prednisone 60 mg daily with supplementation of iron folate  "and B12 starting 7/31/15.  Her prednisone has been slowly tapered and was discontinued off in January 2016.  She was last followed at [facility] on March 10 when she had stable hemoglobin.      SUBJECTIVE   Tricia comes alone for this clinic visit for her hemolytic anemia.    Tricia is doing well overall. She notes that she has occasional fatigue and SOB. She has noticed weakness in her upper body muscles. She has been told that her gluteus muscles are weak and are not supporting her body weight well. Her posture while walking is not normal. She wonders if this is related to her hemolytic anemia.     She is here with labs done prior to clinic visit.       PAST MEDICAL HISTORY   1. Common variable immunodeficiency syndrome  2. 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 Selective IgA deficiency  4. Leukopenia with markedly low CD4 counts on Bactrim prophylaxis  5. History of fall with subdural hematoma in 6/11/14 with complete resolution  6. Hashimoto's disease status post partial thyroidectomy      CURRENT OUTPATIENT MEDICATIONS     Current Outpatient Prescriptions   Medication Sig     cyanocobalamin (VITAMIN  B-12) 1000 MCG tablet      FLUOCINOLONE ACETONIDE SCALP 0.01 % OIL oil      folic acid (FOLVITE) 1 MG tablet      ketoconazole (NIZORAL) 2 % shampoo      levothyroxine (SYNTHROID/LEVOTHROID) 150 MCG tablet TAKE 1 TABLET BY MOUTH ONCE DAILY     sulfamethoxazole-trimethoprim (BACTRIM DS/SEPTRA DS) 800-160 MG per tablet Take 1 pill orally on Mon, Wed and Friday     No current facility-administered medications for this visit.         ALLERGIES     Allergies   Allergen Reactions     Doxycycline         REVIEW OF SYSTEMS   As above in the HPI, o/w complete 12-point ROS was negative.     PHYSICAL EXAM   /64  Pulse 76  Temp 97.8  F (36.6  C) (Tympanic)  Resp 16  Ht 1.854 m (6' 1\")  Wt 70.2 kg (154 lb 12.8 oz)  SpO2 95%  BMI 20.42 kg/m2    SpO2 Readings from Last 4 " Encounters:   05/29/18 95%   05/17/18 98%   04/17/18 100%   03/06/18 98%     Wt Readings from Last 3 Encounters:   05/29/18 70.2 kg (154 lb 12.8 oz)   05/23/18 70.3 kg (155 lb)   05/17/18 70.8 kg (156 lb)     GEN: NAD  HEENT: PERRL, EOMI, no icterus, injection or pallor. Oropharynx is clear.  NECK: no cervical or supraclavicular lymphadenopathy  LUNGS: clear bilaterally  CV: regular, no murmurs, rubs, or gallops  ABDOMEN: soft, non-tender, non-distended, normal bowel sounds, no hepatosplenomegaly by percussion or palpation  EXT: warm, well perfused, no edema  NEURO: alert  SKIN: no rashes     LABORATORY AND IMAGING STUDIES     Recent Labs   Lab Test  05/23/18   0959  02/27/18   0931  12/04/17   1454  10/20/17   1039  09/12/17   0828   NA  140  138  139  137  140   POTASSIUM  4.2  4.0  4.0  4.4  4.1   CHLORIDE  107  105  106  104  105   CO2  26  29  29  29  25   ANIONGAP  7  4  4  4  10   BUN  13  14  12  12  15   CR  0.69  0.81  0.69  0.61  0.72   GLC  105*  82  83  73  110*   CULLEN  8.4*  8.5  8.6  8.6  8.6     No results for input(s): MAG, PHOS in the last 85734 hours.  Recent Labs   Lab Test  05/23/18   0959  02/27/18   0931  12/04/17   1454  10/20/17   1039  09/12/17   0828   WBC  4.9  4.7  4.5  4.8  3.8*   HGB  11.7  12.4  11.8  12.4  11.4*   PLT  158  154  166  155  144*   MCV  89  89  89  89  88   NEUTROPHIL  62.4  61.2  62.4  64.5  62.0     Recent Labs   Lab Test  05/23/18   0959  02/27/18   0931  12/04/17   1454   BILITOTAL  0.8  0.9  0.7   ALKPHOS  113  114  117   ALT  31  25  35   AST  42  46*  50*   ALBUMIN  3.9  4.1  3.9   LDH  348*  305*  362*     TSH   Date Value Ref Range Status   10/20/2017 1.90 0.40 - 4.00 mU/L Final   06/19/2017 1.82 0.40 - 4.00 mU/L Final   04/03/2017 6.29 (H) 0.40 - 4.00 mU/L Final     Recent Labs   Lab Test  05/23/18   0959  02/27/18   0931  12/04/17   1454  10/20/17   1039  09/12/17   0828  06/19/17   0952   04/14/15   1116   B12  802  687  825   --   636  712   < >  277   FOLIC    --    --    --    --    --    --    --   10.7   HGB  11.7  12.4  11.8  12.4  11.4*  12.0   < >   --    RETICABSCT  61.8  59.9  52.0   --   53.8  52.7   < >   --    RETP  1.6  1.5  1.3   --   1.4  1.3   < >   --     < > = values in this interval not displayed.      6/19/2017 09:52 9/12/2017 08:28 12/4/2017 14:54 2/27/2018 09:31 5/23/2018 09:59   Haptoglobin <6 (L) <6 (L) <6 (L) <6 (L) <6 (L)   IGG 1050 977 985 878 719    76 72 78 58 (L)        ASSESSMENT AND PLAN   1. 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 Common variable immunodeficiency disorder  4. Splenomegaly    Tricia is doing well and has no new complains. I reviewed all her labs with her. They have been stable since last visit. For the most important one - her Hgb has improved to 11.7 g/dl this time. She continues to have no Ig A, though her Ig G/Ig M are stable with replacement. She continues to hemolyze and has undetectable haptoglobin and elevated LDH at 348 improved from 977 previously.      She has a fair response to Ig infusions and it has helped her Hgb counts too. We would continue to do her infusions here and space it out to every 6 weeks.     I will see her in 12 weeks with labs.    I am not sure about the cause for her weakness in her major muscles.       Jose Summers  Adj ,  Division of Hematology, Oncology & Transplantation  [facility].        Again, thank you for allowing me to participate in the care of your patient.        Sincerely,        Jose Summers MD

## 2018-05-29 NOTE — PROGRESS NOTES
[facility] CANCER CLINIC  FOLLOW-UP VISIT NOTE    PATIENT NAME: Tricia Sosa MRN # [id number]  DATE OF VISIT: May 29, 2018 YOB: 1940    REFERRING PROVIDER: No referring provider defined for this encounter.    DIAGNOSIS: Hemolytic anemia-autoimmune; IgA deficiency    TREATMENT SUMMARY:  Tricia has been diagnosed with IgA deficiency since her around 2000.  She was very sick at the time and had severe diarrhea.  She lost about 40 pounds in weight.  The workup revealed that she had markedly diminished CD4 counts of 48 and was diagnosed with CMV colitis.  Since then she has been followed in hematology clinic at [place] until recently.  She was noted to have anemia which was fairly long-standing.  She was initially referred for anemia in 2004 and also had a bone marrow biopsy done at that time which revealed hypercellular bone marrow with 70-80% cellularity and normal trilineage hematopoiesis and no morphologic features of MDS or lymphoproliferation.  Her anemia was attributed to her chronic underlying disease.  At the next evaluation in 2002 on 4 aggressive anemia there was no evidence of hemolysis, iron deficiency, B12 or folate deficiency.  Bone marrow biopsy was not pursued.  In summer of 2015 she had progressive fatigue, dyspnea on exertion and worsening anemia with a hemoglobin now of 9.  Workup at this time did suggest a hemolytic anemia with elevated LDH at 709, undetectable haptoglobin and elevated unconjugated bilirubin at 3.3.  Monospecific MARIKA was positive for both IgG and complement.  Cold agglutinin screen was positive with very low titer 1:64.  She was started on prednisone 60 mg daily with supplementation of iron folate and B12 starting 7/31/15.  Her prednisone has been slowly tapered and was discontinued off in January 2016.  She was last followed at [facility] on March 10 when she had stable hemoglobin.      SUBJECTIVE   Tricia comes alone for this clinic visit for  "her hemolytic anemia.    Tricia is doing well overall. She notes that she has occasional fatigue and SOB. She has noticed weakness in her upper body muscles. She has been told that her gluteus muscles are weak and are not supporting her body weight well. Her posture while walking is not normal. She wonders if this is related to her hemolytic anemia.     She is here with labs done prior to clinic visit.       PAST MEDICAL HISTORY   1. Common variable immunodeficiency syndrome  2. 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 Selective IgA deficiency  4. Leukopenia with markedly low CD4 counts on Bactrim prophylaxis  5. History of fall with subdural hematoma in 6/11/14 with complete resolution  6. Hashimoto's disease status post partial thyroidectomy      CURRENT OUTPATIENT MEDICATIONS     Current Outpatient Prescriptions   Medication Sig     cyanocobalamin (VITAMIN  B-12) 1000 MCG tablet      FLUOCINOLONE ACETONIDE SCALP 0.01 % OIL oil      folic acid (FOLVITE) 1 MG tablet      ketoconazole (NIZORAL) 2 % shampoo      levothyroxine (SYNTHROID/LEVOTHROID) 150 MCG tablet TAKE 1 TABLET BY MOUTH ONCE DAILY     sulfamethoxazole-trimethoprim (BACTRIM DS/SEPTRA DS) 800-160 MG per tablet Take 1 pill orally on Mon, Wed and Friday     No current facility-administered medications for this visit.         ALLERGIES     Allergies   Allergen Reactions     Doxycycline         REVIEW OF SYSTEMS   As above in the HPI, o/w complete 12-point ROS was negative.     PHYSICAL EXAM   /64  Pulse 76  Temp 97.8  F (36.6  C) (Tympanic)  Resp 16  Ht 1.854 m (6' 1\")  Wt 70.2 kg (154 lb 12.8 oz)  SpO2 95%  BMI 20.42 kg/m2    SpO2 Readings from Last 4 Encounters:   05/29/18 95%   05/17/18 98%   04/17/18 100%   03/06/18 98%     Wt Readings from Last 3 Encounters:   05/29/18 70.2 kg (154 lb 12.8 oz)   05/23/18 70.3 kg (155 lb)   05/17/18 70.8 kg (156 lb)     GEN: NAD  HEENT: PERRL, EOMI, no icterus, " injection or pallor. Oropharynx is clear.  NECK: no cervical or supraclavicular lymphadenopathy  LUNGS: clear bilaterally  CV: regular, no murmurs, rubs, or gallops  ABDOMEN: soft, non-tender, non-distended, normal bowel sounds, no hepatosplenomegaly by percussion or palpation  EXT: warm, well perfused, no edema  NEURO: alert  SKIN: no rashes     LABORATORY AND IMAGING STUDIES     Recent Labs   Lab Test  05/23/18   0959  02/27/18   0931  12/04/17   1454  10/20/17   1039  09/12/17   0828   NA  140  138  139  137  140   POTASSIUM  4.2  4.0  4.0  4.4  4.1   CHLORIDE  107  105  106  104  105   CO2  26  29  29  29  25   ANIONGAP  7  4  4  4  10   BUN  13  14  12  12  15   CR  0.69  0.81  0.69  0.61  0.72   GLC  105*  82  83  73  110*   CULLEN  8.4*  8.5  8.6  8.6  8.6     No results for input(s): MAG, PHOS in the last 61845 hours.  Recent Labs   Lab Test  05/23/18   0959  02/27/18   0931  12/04/17   1454  10/20/17   1039  09/12/17   0828   WBC  4.9  4.7  4.5  4.8  3.8*   HGB  11.7  12.4  11.8  12.4  11.4*   PLT  158  154  166  155  144*   MCV  89  89  89  89  88   NEUTROPHIL  62.4  61.2  62.4  64.5  62.0     Recent Labs   Lab Test  05/23/18   0959  02/27/18   0931  12/04/17   1454   BILITOTAL  0.8  0.9  0.7   ALKPHOS  113  114  117   ALT  31  25  35   AST  42  46*  50*   ALBUMIN  3.9  4.1  3.9   LDH  348*  305*  362*     TSH   Date Value Ref Range Status   10/20/2017 1.90 0.40 - 4.00 mU/L Final   06/19/2017 1.82 0.40 - 4.00 mU/L Final   04/03/2017 6.29 (H) 0.40 - 4.00 mU/L Final     Recent Labs   Lab Test  05/23/18   0959  02/27/18   0931  12/04/17   1454  10/20/17   1039  09/12/17   0828  06/19/17   0952   04/14/15   1116   B12  802  687  825   --   146  712   < >  277   FOLIC   --    --    --    --    --    --    --   10.7   HGB  11.7  12.4  11.8  12.4  11.4*  12.0   < >   --    RETICABSCT  61.8  59.9  52.0   --   53.8  52.7   < >   --    RETP  1.6  1.5  1.3   --   1.4  1.3   < >   --     < > = values in this interval not  displayed.      6/19/2017 09:52 9/12/2017 08:28 12/4/2017 14:54 2/27/2018 09:31 5/23/2018 09:59   Haptoglobin <6 (L) <6 (L) <6 (L) <6 (L) <6 (L)   IGG 1050 977 985 878 719    76 72 78 58 (L)        ASSESSMENT AND PLAN   1. 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 Common variable immunodeficiency disorder  4. Splenomegaly    Tricia is doing well and has no new complains. I reviewed all her labs with her. They have been stable since last visit. For the most important one - her Hgb has improved to 11.7 g/dl this time. She continues to have no Ig A, though her Ig G/Ig M are stable with replacement. She continues to hemolyze and has undetectable haptoglobin and elevated LDH at 348 improved from 977 previously.      She has a fair response to Ig infusions and it has helped her Hgb counts too. We would continue to do her infusions here and space it out to every 6 weeks.     I will see her in 12 weeks with labs.    I am not sure about the cause for her weakness in her major muscles.       Jose Summers  Adj ,  Division of Hematology, Oncology & Transplantation  [facility].

## 2018-05-29 NOTE — PROGRESS NOTES
"Infusion Nursing Note:  Tricia Sosa presents today for IVIG.    Patient seen by provider today: Yes: Melina   present during visit today: Not Applicable.    Note: Assessment done by MD at appointment.  Pt tolerates IVIG with SoluCortef only.  Does not take Tylenol or Benadryl.     IVIG initiated at rate of 0.5mg/kg/hr.  Increased by 1.0mg/kg/hr every 15 minutes to max rate of 4.0mg/kg/hr.    Intravenous Access:  Peripheral IV placed.    Treatment Conditions:  Rheumatology Infusion Checklist: PRIOR TO INFUSION OF BIOLOGICAL MEDICATIONS OR ANY OF THESE AS LISTED: Immune Globulin (IVIG) \".rheumbiologicalchecklist\"    Prior to Infusion of biological medications or any of these as listed:    1. Elevated temperature, fever, chills, productive cough or abnormal vital signs, night sweats, coughing up blood or sputum, no appetite or abnormal vital signs : NO    2. Open wounds or new incisions: NO    3. Recent hospitalization: NO    4.  Recent surgeries:  NO    5. Any upcoming surgeries or dental procedures?:NO    6. Any current or recent bouts of illness or infection? On any antibiotics? : NO    7. Any new, sudden or worsening abdominal pain :NO    8. Vaccination within 4 weeks? Patient or someone in the household is scheduled to receive vaccination? No live virus vaccines prior to or during treatment :NO    9. Any nervous system diseases [i.e. multiple sclerosis, Guillain-Yatahey, seizures, neurological  changes]: NO    10. Pregnant or breast feeding; or plans on pregnancy in the future: NO    11. Signs of worsening depression or suicidal ideations while taking benlysta:NO    12. New-onset medical symptoms: NO    13.  New cancer diagnosis or on chemotherapy or radiation NO    14.  Evaluate for any sign of active TB [Unexplained weight loss, Loss of appetite, Night sweats, Fever, Fatigue, Chills, Coughing for 3 weeks or longer, Hemoptysis (coughing up blood), Chest pain]: NO    **Note: If answered yes to any of the " above, hold the infusion and contact ordering rheumatologist or on-call rheumatologist.   .      Post Infusion Assessment:  Patient tolerated infusion without incident.  Blood return noted pre and post infusion.  Site patent and intact, free from redness, edema or discomfort.  No evidence of extravasations.  Access discontinued per protocol.    Discharge Plan:   Discharge instructions reviewed with: Patient.  Patient discharged in stable condition accompanied by: self.  Departure Mode: Ambulatory.  Next infusion scheduled for 7/10/18    STACY CHEATHAM RN

## 2018-05-29 NOTE — NURSING NOTE
"Oncology Rooming Note    May 29, 2018 9:55 AM   Tricia Sosa is a 77 year old female who presents for:    Chief Complaint   Patient presents with     Oncology Clinic Visit     CVID (common variable immunodeficiency-Follow up     Initial Vitals: Resp 16  Ht 1.854 m (6' 1\")  Wt 70.2 kg (154 lb 12.8 oz)  BMI 20.42 kg/m2 Estimated body mass index is 20.42 kg/(m^2) as calculated from the following:    Height as of this encounter: 1.854 m (6' 1\").    Weight as of this encounter: 70.2 kg (154 lb 12.8 oz). Body surface area is 1.9 meters squared.  No Pain (0) Comment: Data Unavailable   No LMP recorded. Patient is postmenopausal.  Allergies reviewed: Yes  Medications reviewed: Yes    Medications: Medication refills not needed today.  Pharmacy name entered into [facility]:    [id number] - [place], [place] - [address] [facility] [address] [facility]    Clinical concerns: Follow-up     8 minutes for nursing intake (face to face time)     DISCHARGE PLAN:  Next appointments: See patient instruction section  Departure Mode: Ambulatory  Accompanied by: self  0 minutes for nursing discharge (face to face time)   Roya Huston                "

## 2018-05-29 NOTE — MR AVS SNAPSHOT
After Visit Summary   5/29/2018    Tricia Sosa    MRN: [id number]           Patient Information     Date Of Birth          1940        Visit Information        Provider Department      5/29/2018 10:30 AM RH INFUSION CHAIR 2 [facility]        Today's Diagnoses     CVID (common variable immunodeficiency) (H)    -  1       Follow-ups after your visit        Your next 10 appointments already scheduled     Jul 10, 2018 10:00 AM CDT   Level 5 with RH INFUSION CHAIR 1   [facility]fusion [facility] ([facility])    [facility] [place] [address]  [facility] [address]   [phone]            Aug 21, 2018  9:00 AM CDT   Lab with Infusion with RH LAB DRAW 1   [facility]fusion [facility] ([facility])    [facility]  [address]  [facility] [address]   [phone]            Aug 21, 2018  9:15 AM CDT   Return Visit with Jose Summers MD   [facility] ([facility])    [facility] [place] [address]  [facility] [address]   [phone]            Aug 21, 2018 10:00 AM CDT   Level 5 with RH INFUSION CHAIR 2   [facility] ([facility])    [facility] [place] [address]  [facility] [address]   [phone]              Future tests that were ordered for you today     Open Standing Orders        Priority Remaining Interval Expires Ordered    Vitamin B12 Routine 25/25 5/29/2019 5/29/2018    CBC with platelets differential Routine 25/25 5/29/2019 5/29/2018    Comprehensive metabolic panel Routine 25/25 5/29/2019 5/29/2018    Haptoglobin Routine 25/25 5/29/2019 5/29/2018    IgA Routine 25/25 5/29/2019 5/29/2018    IgG Routine 25/25 5/29/2019 5/29/2018    IgM Routine 25/25 5/29/2019  5/29/2018    RETICULOCYTE COUNT Routine 25/25 5/29/2019 5/29/2018            Who to contact     If you have questions or need follow up information about today's clinic visit or your schedule please contact [facility] INF[facility]ION [facility] directly at [phone].  Normal or non-critical lab and imaging results will be communicated to you by MyChart, letter or phone within 4 business days after the clinic has received the results. If you do not hear from us within 7 days, please contact the clinic through [employer] or phone. If you have a critical or abnormal lab result, we will notify you by phone as soon as possible.  Submit refill requests through [employer] or call your pharmacy and they will forward the refill request to us. Please allow 3 business days for your refill to be completed.          Additional Information About Your Visit        MyChart Information     [employer] gives you secure access to your electronic health record. If you see a primary care provider, you can also send messages to your care team and make appointments. If you have questions, please call your primary care clinic.  If you do not have a primary care provider, please call [phone] and they will assist you.        Care EveryWhere ID     This is your Care EveryWhere ID. This could be used by other organizations to access your [facility] medical records  [id number]         Blood Pressure from Last 3 Encounters:   05/29/18 120/64   05/23/18 120/76   05/17/18 134/62    Weight from Last 3 Encounters:   05/29/18 70.2 kg (154 lb 12.8 oz)   05/23/18 70.3 kg (155 lb)   05/17/18 70.8 kg (156 lb)              Today, you had the following     No orders found for display       Primary Care Provider Office Phone # Fax #    SHANIQUE Walsh Ra [facility] [phone]       [address]  [facility] [address]        Equal Access to Services     ZIA HENIRQUEZ AH: Joey Serna, andry sotelo, larissa asher  naya wolffjennbillie la'aan ah. So [facility] [phone].    ATENCIÓN: Si jr salas, tiene a dial disposición servicios gratuitos de asistencia lingüística. Carlos Eduardo al [phone].    We comply with applicable federal civil rights laws and [place] laws. We do not discriminate on the basis of race, color, national origin, age, disability, sex, sexual orientation, or gender identity.            Thank you!     Thank you for choosing [facility] INFUSION SERVICES  for your care. Our goal is always to provide you with excellent care. Hearing back from our patients is one way we can continue to improve our services. Please take a few minutes to complete the written survey that you may receive in the mail after your visit with us. Thank you!             Your Updated Medication List - Protect others around you: Learn how to safely use, store and throw away your medicines at www.disposemymeds.org.          This list is accurate as of 5/29/18  1:15 PM.  Always use your most recent med list.                   Brand Name Dispense Instructions for use Diagnosis    cyanocobalamin 1000 MCG tablet    vitamin  B-12      Anemia, Back pain       Fluocinolone Acetonide Scalp 0.01 % Oil oil           folic acid 1 MG tablet    FOLVITE          ketoconazole 2 % shampoo    NIZORAL          levothyroxine 150 MCG tablet    SYNTHROID/LEVOTHROID    90 tablet    TAKE 1 TABLET BY MOUTH ONCE DAILY    Hypothyroidism, unspecified type       sulfamethoxazole-trimethoprim 800-160 MG per tablet    BACTRIM DS/SEPTRA DS    45 tablet    Take 1 pill orally on Mon, Wed and Friday    CVID (common variable immunodeficiency) (H), Other autoimmune hemolytic anemias (H), B12 deficiency

## 2018-05-30 DIAGNOSIS — E53.8 B12 DEFICIENCY: Primary | ICD-10-CM

## 2018-05-30 DIAGNOSIS — D59.19 OTHER AUTOIMMUNE HEMOLYTIC ANEMIAS: ICD-10-CM

## 2018-07-10 ENCOUNTER — INFUSION THERAPY VISIT (OUTPATIENT)
Dept: INFUSION THERAPY | Facility: CLINIC | Age: 78
End: 2018-07-10
Attending: INTERNAL MEDICINE
Payer: MEDICARE

## 2018-07-10 VITALS
WEIGHT: 154.1 LBS | RESPIRATION RATE: 16 BRPM | HEART RATE: 73 BPM | DIASTOLIC BLOOD PRESSURE: 58 MMHG | BODY MASS INDEX: 20.33 KG/M2 | SYSTOLIC BLOOD PRESSURE: 112 MMHG | TEMPERATURE: 97.8 F

## 2018-07-10 DIAGNOSIS — D83.9 CVID (COMMON VARIABLE IMMUNODEFICIENCY) (H): Primary | ICD-10-CM

## 2018-07-10 PROCEDURE — 96365 THER/PROPH/DIAG IV INF INIT: CPT

## 2018-07-10 PROCEDURE — 96366 THER/PROPH/DIAG IV INF ADDON: CPT

## 2018-07-10 PROCEDURE — 25000128 H RX IP 250 OP 636: Performed by: INTERNAL MEDICINE

## 2018-07-10 PROCEDURE — 96375 TX/PRO/DX INJ NEW DRUG ADDON: CPT

## 2018-07-10 RX ORDER — DIPHENHYDRAMINE HCL 25 MG
25 CAPSULE ORAL
Status: CANCELLED | OUTPATIENT
Start: 2018-07-10

## 2018-07-10 RX ORDER — ACETAMINOPHEN 325 MG/1
650 TABLET ORAL
Status: CANCELLED
Start: 2018-07-10

## 2018-07-10 RX ADMIN — HYDROCORTISONE SODIUM SUCCINATE 100 MG: 100 INJECTION, POWDER, FOR SOLUTION INTRAMUSCULAR; INTRAVENOUS at 10:20

## 2018-07-10 RX ADMIN — HUMAN IMMUNOGLOBULIN G 35 G: 20 LIQUID INTRAVENOUS at 10:33

## 2018-07-10 NOTE — PROGRESS NOTES
"Infusion Nursing Note:  Tricia Sosa presents today for IVIG with premed.    Patient seen by provider today: No   present during visit today: Not Applicable.    Note: Patient here for IVIG     Privigen 35 g     IVIG started at 0.5 cc/kg/hr. Increased by 1cc/kg/hr every 15 minutes for max rate of 4 cc/kg/hr.      Intravenous Access:  Peripheral IV placed.    Treatment Conditions:  Rheumatology Infusion Checklist: PRIOR TO INFUSION OF BIOLOGICAL MEDICATIONS OR ANY OF THESE AS LISTED: Immune Globulin (IVIG) \".rheumbiologicalchecklist\"    Prior to Infusion of biological medications or any of these as listed:    1. Elevated temperature, fever, chills, productive cough or abnormal vital signs, night sweats, coughing up blood or sputum, no appetite or abnormal vital signs : NO    2. Open wounds or new incisions: NO    3. Recent hospitalization: NO    4.  Recent surgeries:  NO    5. Any upcoming surgeries or dental procedures?:NO    6. Any current or recent bouts of illness or infection? On any antibiotics? : Long term Bactrim since 2004    7. Any new, sudden or worsening abdominal pain :NO    8. Vaccination within 4 weeks? Patient or someone in the household is scheduled to receive vaccination? No live virus vaccines prior to or during treatment :NO    9. Any nervous system diseases [i.e. multiple sclerosis, Guillain-Hopedale, seizures, neurological  changes]: NO    10. Pregnant or breast feeding; or plans on pregnancy in the future: NO    11. Signs of worsening depression or suicidal ideations while taking benlysta:NO    12. New-onset medical symptoms: NO    13.  New cancer diagnosis or on chemotherapy or radiation NO    14.  Evaluate for any sign of active TB [Unexplained weight loss, Loss of appetite, Night sweats, Fever, Fatigue, Chills, Coughing for 3 weeks or longer, Hemoptysis (coughing up blood), Chest pain]: NO      Post Infusion Assessment:  Patient tolerated infusion without incident.  Blood return " noted pre and post infusion.  Site patent and intact, free from redness, edema or discomfort.  Access discontinued per protocol.    Discharge Plan:   Discharge instructions reviewed with: Patient.  Patient and/or family verbalized understanding of discharge instructions and all questions answered.  AVS to patient via [employer].  Patient will return 8/21 for next appointment.   Patient discharged in stable condition accompanied by: self.  Departure Mode: Ambulatory.    Ewa High RN

## 2018-07-10 NOTE — MR AVS SNAPSHOT
After Visit Summary   7/10/2018    Tricia Sosa    MRN: [id number]           Patient Information     Date Of Birth          1940        Visit Information        Provider Department      7/10/2018 10:00 AM RH INFUSION CHAIR 1 [facility] Infusion [facility]        Today's Diagnoses     CVID (common variable immunodeficiency) (H)    -  1       Follow-ups after your visit        Your next 10 appointments already scheduled     Aug 21, 2018  9:00 AM CDT   Lab with Infusion with RH LAB DRAW 1   [facility] ([facility])    [place] [id number]  [facility] [address]   [phone]            Aug 21, 2018  9:15 AM CDT   Return Visit with Jose Summers MD   [facility] ([facility])    [facility]  [address]  [facility] [address]   [phone]            Aug 21, 2018 10:00 AM CDT   Level 5 with RH INFUSION CHAIR 2   [facility] ([facility])    [facility]  [address]  [facility] [address]   [phone]              Who to contact     If you have questions or need follow up information about today's clinic visit or your schedule please contact [facility] INFUSION SERVICES directly at [phone].  Normal or non-critical lab and imaging results will be communicated to you by MyChart, letter or phone within 4 business days after the clinic has received the results. If you do not hear from us within 7 days, please contact the clinic through MyChart or phone. If you have a critical or abnormal lab result, we will notify you by phone as soon as possible.  Submit refill requests through [employer] or call your pharmacy and they will forward the refill request to us. Please allow 3 business days for your refill to be  completed.          Additional Information About Your Visit        MyChart Information     [employer] gives you secure access to your electronic health record. If you see a primary care provider, you can also send messages to your care team and make appointments. If you have questions, please call your primary care clinic.  If you do not have a primary care provider, please call [phone] and they will assist you.        Care EveryWhere ID     This is your Care EveryWhere ID. This could be used by other organizations to access your [place] [facility] medical records  [id number]        Your Vitals Were     Pulse Temperature Respirations BMI (Body Mass Index)          73 97.8  F (36.6  C) (Tympanic) 16 20.33 kg/m2         Blood Pressure from Last 3 Encounters:   07/10/18 112/58   05/29/18 120/64   05/23/18 120/76    Weight from Last 3 Encounters:   07/10/18 69.9 kg (154 lb 1.6 oz)   05/29/18 70.2 kg (154 lb 12.8 oz)   05/23/18 70.3 kg (155 lb)              Today, you had the following     No orders found for display       Primary Care Provider Office Phone # Fax #    Monika SHANIQUE Jason [facility] [phone]       [address] [facility] [address]        Equal Access to Services     ZIA HENRIQUEZ : Hadii aad ku hadasho Soomaali, waaxda luqadaha, qaybta kaalmada adeegyada, waxay idiin haycarlos manueln marek leon. So [facility] [phone].    ATENCIÓN: Si habla español, tiene a dial disposición servicios gratuitos de asistencia lingüística. Llame al [phone].    We comply with applicable federal civil rights laws and [place] laws. We do not discriminate on the basis of race, color, national origin, age, disability, sex, sexual orientation, or gender identity.            Thank you!     Thank you for choosing [facility] INFUSION SERVICES  for your care. Our goal is always to provide you with excellent care. Hearing back from our patients is one way we can continue to improve our services.  Please take a few minutes to complete the written survey that you may receive in the mail after your visit with us. Thank you!             Your Updated Medication List - Protect others around you: Learn how to safely use, store and throw away your medicines at www.disposemymeds.org.          This list is accurate as of 7/10/18  3:41 PM.  Always use your most recent med list.                   Brand Name Dispense Instructions for use Diagnosis    cyanocobalamin 1000 MCG tablet    vitamin  B-12      Anemia, Back pain       Fluocinolone Acetonide Scalp 0.01 % Oil oil           folic acid 1 MG tablet    FOLVITE          ketoconazole 2 % shampoo    NIZORAL          levothyroxine 150 MCG tablet    SYNTHROID/LEVOTHROID    90 tablet    TAKE 1 TABLET BY MOUTH ONCE DAILY    Hypothyroidism, unspecified type       sulfamethoxazole-trimethoprim 800-160 MG per tablet    BACTRIM DS/SEPTRA DS    45 tablet    Take 1 pill orally on Mon, Wed and Friday    CVID (common variable immunodeficiency) (H), Other autoimmune hemolytic anemias (H), B12 deficiency

## 2018-07-23 ENCOUNTER — OFFICE VISIT (OUTPATIENT)
Dept: FAMILY MEDICINE | Facility: CLINIC | Age: 78
End: 2018-07-23
Payer: COMMERCIAL

## 2018-07-23 VITALS
RESPIRATION RATE: 16 BRPM | WEIGHT: 151 LBS | BODY MASS INDEX: 19.92 KG/M2 | DIASTOLIC BLOOD PRESSURE: 66 MMHG | TEMPERATURE: 98.1 F | HEART RATE: 80 BPM | OXYGEN SATURATION: 97 % | SYSTOLIC BLOOD PRESSURE: 128 MMHG

## 2018-07-23 DIAGNOSIS — M62.81 GENERALIZED MUSCLE WEAKNESS: Primary | ICD-10-CM

## 2018-07-23 DIAGNOSIS — D23.5 BENIGN NEOPLASM OF SKIN OF TRUNK, EXCEPT SCROTUM: ICD-10-CM

## 2018-07-23 PROCEDURE — 99213 OFFICE O/P EST LOW 20 MIN: CPT | Performed by: NURSE PRACTITIONER

## 2018-07-23 NOTE — MR AVS SNAPSHOT
After Visit Summary   7/23/2018    Tricia Sosa    MRN: [id number]           Patient Information     Date Of Birth          1940        Visit Information        Provider Department      7/23/2018 9:20 AM Monika Whipple Ra, SHANIQUE [facility]        Today's Diagnoses     Generalized muscle weakness    -  1    Benign neoplasm of skin of trunk, except scrotum          Care Instructions    Schedule with Dr. Seay.  Also schedule with dermatology when it is convenient.          Follow-ups after your visit        Additional Services     DERMATOLOGY REFERRAL       Your provider has referred you to: [facility]: [facility] [place] [phone]   [email or web address]  [facility]: [facility] [place] [phone]   [email or web address]    Please be aware that coverage of these services is subject to the terms and limitations of your health insurance plan.  Call member services at your health plan with any benefit or coverage questions.      Please bring the following with you to your appointment:    (1) Any X-Rays, CTs or MRIs which have been performed.  Contact the facility where they were done to arrange for  prior to your scheduled appointment.    (2) List of current medications  (3) This referral request   (4) Any documents/labs given to you for this referral                  Follow-up notes from your care team     Return in about 6 months (around 1/23/2019) for follow up.      Your next 10 appointments already scheduled     Aug 21, 2018  9:00 AM CDT   Lab with Infusion with RH LAB DRAW 1   [facility] ([facility])    [facility]  [address]  [facility] [address]   [phone]            Aug 21, 2018  9:15 AM CDT   Return Visit with Jose Summers MD   [facility] ([facility])    [facility]  [facility]  [address]  [facility] [address] [phone]            Aug 21, 2018 10:00 AM CDT   Level 5 with RH INFUSION CHAIR 2   [facility] ([facility])    [facility]  [address]  [facility] [address] [phone]              Who to contact     If you have questions or need follow up information about today's clinic visit or your schedule please contact [facility] directly at [phone].  Normal or non-critical lab and imaging results will be communicated to you by MyChart, letter or phone within 4 business days after the clinic has received the results. If you do not hear from us within 7 days, please contact the clinic through [employer] or phone. If you have a critical or abnormal lab result, we will notify you by phone as soon as possible.  Submit refill requests through [employer] or call your pharmacy and they will forward the refill request to us. Please allow 3 business days for your refill to be completed.          Additional Information About Your Visit        MyChart Information     [employer] gives you secure access to your electronic health record. If you see a primary care provider, you can also send messages to your care team and make appointments. If you have questions, please call your primary care clinic.  If you do not have a primary care provider, please call [phone] and they will assist you.        Care EveryWhere ID     This is your Care EveryWhere ID. This could be used by other organizations to access your [facility] medical records  [id number]        Your Vitals Were     Pulse Temperature Respirations Pulse Oximetry BMI (Body Mass Index)       80 98.1  F (36.7  C) (Tympanic) 16 97% 19.92 kg/m2        Blood Pressure from Last 3 Encounters:   07/23/18 128/66   07/10/18 112/58   05/29/18 120/64    Weight from Last 3 Encounters:   07/23/18 151 lb (68.5 kg)    07/10/18 154 lb 1.6 oz (69.9 kg)   05/29/18 154 lb 12.8 oz (70.2 kg)              We Performed the Following     DERMATOLOGY REFERRAL        Primary Care Provider Office Phone # Fax #    SHANIQUE Walsh Ra [facility] [phone] [address] [facility] [address]        Equal Access to Services     [facility] : Hadii aad ku hadasho Soomaali, waaxda luqadaha, qaybta kaalmada adeegyada, waxay idiin hayaan adeeg kharash la'aan ah. So [facility] [phone].    ATENCIÓN: Si habla esplina, tiene a dial disposición servicios gratuitos de asistencia lingüística. Llame al [phone].    We comply with applicable federal civil rights laws and [place] laws. We do not discriminate on the basis of race, color, national origin, age, disability, sex, sexual orientation, or gender identity.            Thank you!     Thank you for choosing [facility]  for your care. Our goal is always to provide you with excellent care. Hearing back from our patients is one way we can continue to improve our services. Please take a few minutes to complete the written survey that you may receive in the mail after your visit with us. Thank you!             Your Updated Medication List - Protect others around you: Learn how to safely use, store and throw away your medicines at www.disposemymeds.org.          This list is accurate as of 7/23/18 10:13 AM.  Always use your most recent med list.                   Brand Name Dispense Instructions for use Diagnosis    cyanocobalamin 1000 MCG tablet    vitamin  B-12      Anemia, Back pain       Fluocinolone Acetonide Scalp 0.01 % Oil oil           folic acid 1 MG tablet    FOLVITE          ketoconazole 2 % shampoo    NIZORAL          levothyroxine 150 MCG tablet    SYNTHROID/LEVOTHROID    90 tablet    TAKE 1 TABLET BY MOUTH ONCE DAILY    Hypothyroidism, unspecified type       sulfamethoxazole-trimethoprim 800-160 MG per tablet    BACTRIM DS/SEPTRA DS    45 tablet     Take 1 pill orally on Mon, Wed and Friday    CVID (common variable immunodeficiency) (H), Other autoimmune hemolytic anemias (H), B12 deficiency

## 2018-07-23 NOTE — PROGRESS NOTES
"  SUBJECTIVE:   Tricia Sosa is a 77 year old female who presents to clinic today for the following health issues:      Back Pain       Duration: ***        Specific cause: {.:960755::\"none\"}    Description:   Location of pain: {.:818967}  Character of pain: {.:672043}  Pain radiation:{.:740794::\"none\"}  New numbness or weakness in legs, not attributed to pain:  { :280011}    Intensity: {.:867264::\"Currently ***/10\"}    History:   Pain interferes with job: {.:181509::\"***\"}  History of back problems: {.:644794::\"no prior back problems\"}  Any previous MRI or X-rays: {.:468614::\"None\"}  Sees a specialist for back pain:  { :793494::\"No\"}  Therapies tried without relief: {.:107053}    Alleviating factors:   Improved by: {.:469515}      Precipitating factors:  Worsened by: {.:347184::\"Nothing\"}    Functional and Psychosocial Screen (Mayra STarT Back):      {.:934832}    {Rooming staff to stop here}      Accompanying Signs & Symptoms:  Risk of Fracture:  {.:852051::\"None\"}  Risk of Cauda Equina:  {.:688606::\"None\"}  Risk of Infection:  {.:663706::\"None\"}  Risk of Cancer:  {.:471391::\"None\"}  Risk of Ankylosing Spondylitis:  Onset at age <35, male, AND morning back stiffness. { :215156}    {If yes to any of the last 5 items or sudden/progressive weakness, consider imaging and/or surgical referral, if not already done}    {When medically safe, First line: medication for acute LBP:  Acetaminophen  Second line: NSAIDS, muscle relaxants, and consider gabapentin for radiculopathy; opioids are usually not needed. Can add <dot> pilbp in patient instructions}         {additional problems for provider to add:553311}    Problem list and histories reviewed & adjusted, as indicated.  Additional history: {NONE - AS DOCUMENTED:710038::\"as documented\"}    {HIST REVIEW/ LINKS 2:179253}    Reviewed and updated as needed this visit by clinical staff       Reviewed and updated as needed this visit by Provider         {PROVIDER CHARTING " PREFERENCE:769366}

## 2018-07-23 NOTE — PROGRESS NOTES
SUBJECTIVE:   Tricia Sosa is a 77 year old female who presents to clinic today for the following health issues:      Back problem      Duration: Years    Description (location/character/radiation): Back weakness    Intensity:  moderate    Accompanying signs and symptoms: leg and knee weakness    History (similar episodes/previous evaluation): None    Precipitating or alleviating factors: None    Therapies tried and outcome: None    Patient states that she has trouble standing and walking, no back pain.     Pt reports chronic weakness in her low back and her hips and legs.  She has utilized PT in the past to help strengthen her muscles, no etiology has been found.  Her most recent labs done through hematology looked good.  She feels her symptoms have progressed over time, now finds it difficult to walk long distances without stopping to rest.  She has no chest pain.  Feels some deconditioning with breathing when walking.    Problem list and histories reviewed & adjusted, as indicated.  Additional history: as documented    Patient Active Problem List   Diagnosis     Traction detachment of retina     Hypothyroidism     CVID (common variable immunodeficiency) (H)     Hemolytic anemia (H)     B12 deficiency     CARDIOVASCULAR SCREENING; LDL GOAL LESS THAN 160     Other autoimmune hemolytic anemias (H)     Right-sided low back pain with right-sided sciatica, unspecified chronicity     Past Surgical History:   Procedure Laterality Date     C LIGATE FALLOPIAN TUBE       C THYROIDECTOMY       COLONOSCOPY N/A 4/26/2016    Procedure: COLONOSCOPY;  Surgeon: Dontae Del Rio MD;  Location:  GI      COLONOSCOPY W BIOPSY  4/26/00     HC COLONOSCOPY W BIOPSY  10/8/03     HC COLONOSCOPY W BIOPSY  6/27/05    REPEAT IN 5 YEARS      CYSTOSCOPY,INSERT URETERAL STENT      Ureteral stent insertion      FLEX SIGMOIDOSCOPY W BIOPSY  5/30/00      LAPAROSCOPY, SURGICAL; CHOLECYSTECTOMY  1992     LIGATION OF HEMORRHOID(S)       Hemorrhoidectomy     UPPER GI ENDOSCOPY,BIOPSY  10/01/01       Social History   Substance Use Topics     Smoking status: Never Smoker     Smokeless tobacco: Never Used     Alcohol use No     Family History   Problem Relation Age of Onset     Cancer Mother       of colon cancer at age 90     Cerebrovascular Disease Father       at age 85     Dementia Brother      Dementia Brother      Dementia Brother      pancreatic cancer     Colon Cancer No family hx of            Reviewed and updated as needed this visit by clinical staff       Reviewed and updated as needed this visit by Provider         ROS:  SEE HPI.    OBJECTIVE:     /66 (BP Location: Right arm, Patient Position: Chair, Cuff Size: Adult Regular)  Pulse 80  Temp 98.1  F (36.7  C) (Tympanic)  Resp 16  Wt 151 lb (68.5 kg)  SpO2 97%  BMI 19.92 kg/m2  Body mass index is 19.92 kg/(m^2).  GENERAL: healthy, alert and no distress  PSYCH: mentation appears normal, affect normal/bright    Diagnostic Test Results:  none     ASSESSMENT/PLAN:   1. Generalized muscle weakness  Discussed follow up with neuro- already established with Dr. Seay, will schedule independently.    2. Benign neoplasm of skin of trunk, except scrotum  - DERMATOLOGY REFERRAL    Monika Whipple, APRN CNP  [facility]

## 2018-08-14 ENCOUNTER — TRANSFERRED RECORDS (OUTPATIENT)
Dept: HEALTH INFORMATION MANAGEMENT | Facility: CLINIC | Age: 78
End: 2018-08-14

## 2018-08-15 ENCOUNTER — OFFICE VISIT (OUTPATIENT)
Dept: FAMILY MEDICINE | Facility: CLINIC | Age: 78
End: 2018-08-15
Payer: COMMERCIAL

## 2018-08-15 ENCOUNTER — RADIANT APPOINTMENT (OUTPATIENT)
Dept: GENERAL RADIOLOGY | Facility: CLINIC | Age: 78
End: 2018-08-15
Attending: NURSE PRACTITIONER
Payer: COMMERCIAL

## 2018-08-15 VITALS
BODY MASS INDEX: 19.83 KG/M2 | HEART RATE: 74 BPM | TEMPERATURE: 98.7 F | WEIGHT: 150.3 LBS | RESPIRATION RATE: 14 BRPM | SYSTOLIC BLOOD PRESSURE: 122 MMHG | OXYGEN SATURATION: 97 % | DIASTOLIC BLOOD PRESSURE: 64 MMHG

## 2018-08-15 DIAGNOSIS — M53.3 PAIN IN THE COCCYX: Primary | ICD-10-CM

## 2018-08-15 DIAGNOSIS — R10.84 ABDOMINAL PAIN, GENERALIZED: ICD-10-CM

## 2018-08-15 DIAGNOSIS — M53.3 PAIN IN THE COCCYX: ICD-10-CM

## 2018-08-15 LAB
ALBUMIN UR-MCNC: NEGATIVE MG/DL
APPEARANCE UR: CLEAR
BILIRUB UR QL STRIP: NEGATIVE
COLOR UR AUTO: YELLOW
GLUCOSE UR STRIP-MCNC: NEGATIVE MG/DL
HGB UR QL STRIP: NEGATIVE
KETONES UR STRIP-MCNC: NEGATIVE MG/DL
LEUKOCYTE ESTERASE UR QL STRIP: NEGATIVE
NITRATE UR QL: NEGATIVE
PH UR STRIP: 7 PH (ref 5–7)
SOURCE: NORMAL
SP GR UR STRIP: 1.01 (ref 1–1.03)
UROBILINOGEN UR STRIP-ACNC: 0.2 EU/DL (ref 0.2–1)

## 2018-08-15 PROCEDURE — 81003 URINALYSIS AUTO W/O SCOPE: CPT | Performed by: NURSE PRACTITIONER

## 2018-08-15 PROCEDURE — 72220 X-RAY EXAM SACRUM TAILBONE: CPT | Mod: FY

## 2018-08-15 PROCEDURE — 99213 OFFICE O/P EST LOW 20 MIN: CPT | Performed by: NURSE PRACTITIONER

## 2018-08-15 NOTE — MR AVS SNAPSHOT
After Visit Summary   8/15/2018    Tricia Sosa    MRN: [id number]           Patient Information     Date Of Birth          1940        Visit Information        Provider Department      8/15/2018 11:40 AM Monika Whipple Ra, APRN CNP [facility]        Today's Diagnoses     Pain in the coccyx    -  1    Abdominal pain, generalized           Follow-ups after your visit        Follow-up notes from your care team     Return in about 2 months (around 10/15/2018) for Physical Exam.      Your next 10 appointments already scheduled     Aug 21, 2018  9:00 AM CDT   Lab with Infusion with RH LAB DRAW 1   [facility] ([facility])    [address]  [facility] [address]   [phone]            Aug 21, 2018  9:15 AM CDT   Return Visit with Jose Summesr MD   [facility] ([facility])    [facility]  [address]  [facility] [address]   [phone]            Aug 21, 2018 10:00 AM CDT   Level 5 with RH INFUSION CHAIR 2   [facility] ([facility])    [place] [address]  [facility] [address]   [phone]              Who to contact     If you have questions or need follow up information about today's clinic visit or your schedule please contact [facility] directly at [phone].  Normal or non-critical lab and imaging results will be communicated to you by MyChart, letter or phone within 4 business days after the clinic has received the results. If you do not hear from us within 7 days, please contact the clinic through MyChart or phone. If you have a critical or abnormal lab result, we will notify you by phone as soon as possible.  Submit refill requests through [employer] or call your pharmacy and they will  forward the refill request to us. Please allow 3 business days for your refill to be completed.          Additional Information About Your Visit        TapFwdhart Information     [employer] gives you secure access to your electronic health record. If you see a primary care provider, you can also send messages to your care team and make appointments. If you have questions, please call your primary care clinic.  If you do not have a primary care provider, please call [phone] and they will assist you.        Care EveryWhere ID     This is your Care EveryWhere ID. This could be used by other organizations to access your [facility] medical records  [id number]        Your Vitals Were     Pulse Temperature Respirations Pulse Oximetry BMI (Body Mass Index)       74 98.7  F (37.1  C) (Tympanic) 14 97% 19.83 kg/m2        Blood Pressure from Last 3 Encounters:   08/15/18 122/64   07/23/18 128/66   07/10/18 112/58    Weight from Last 3 Encounters:   08/15/18 150 lb 4.8 oz (68.2 kg)   07/23/18 151 lb (68.5 kg)   07/10/18 154 lb 1.6 oz (69.9 kg)              We Performed the Following     *UA reflex to Microscopic and Culture ([place] [facility] (except [place] and [place])        Primary Care Provider Office Phone # Fax #    Monika SHANIQUE Jason [facility] [phone] [address] [facility] [address]        Equal Access to Services     ELVIN HENRIQUEZ : Hadii aad ku hadasho Soomaali, waaxda luqadaha, qaybta kaalmada adeegyada, naya santiago haycarol ackerman . So [facility] [phone].    ATENCIÓN: Si habla español, tiene a dial disposición servicios gratuitos de asistencia lingüística. Llame al [phone].    We comply with applicable federal civil rights laws and [place] laws. We do not discriminate on the basis of race, color, national origin, age, disability, sex, sexual orientation, or gender identity.            Thank you!     Thank you for choosing [facility]  for your  care. Our goal is always to provide you with excellent care. Hearing back from our patients is one way we can continue to improve our services. Please take a few minutes to complete the written survey that you may receive in the mail after your visit with us. Thank you!             Your Updated Medication List - Protect others around you: Learn how to safely use, store and throw away your medicines at www.disposemymeds.org.          This list is accurate as of 8/15/18 11:59 PM.  Always use your most recent med list.                   Brand Name Dispense Instructions for use Diagnosis    cyanocobalamin 1000 MCG tablet    vitamin  B-12      Anemia, Back pain       Fluocinolone Acetonide Scalp 0.01 % Oil oil           folic acid 1 MG tablet    FOLVITE          ketoconazole 2 % shampoo    NIZORAL          levothyroxine 150 MCG tablet    SYNTHROID/LEVOTHROID    90 tablet    TAKE 1 TABLET BY MOUTH ONCE DAILY    Hypothyroidism, unspecified type       sulfamethoxazole-trimethoprim 800-160 MG per tablet    BACTRIM DS/SEPTRA DS    45 tablet    Take 1 pill orally on Mon, Wed and Friday    CVID (common variable immunodeficiency) (H), Other autoimmune hemolytic anemias (H), B12 deficiency

## 2018-08-15 NOTE — PROGRESS NOTES
SUBJECTIVE:   Tricia Sosa is a 77 year old female who presents to clinic today for the following health issues:      Fall      Duration: X1 week ago    Description (location/character/radiation): lower buttocks pain, neck/ shoulder pain    Intensity:  moderate    Accompanying signs and symptoms: Fatigue     History (similar episodes/previous evaluation): None    Precipitating or alleviating factors: None    Therapies tried and outcome: None     Pt was working in her garden.  Stepped backward and fell over a border in her garden.  Fell on her bottom and then on her back to the ground.  Did not hit her head.  She was initially sore in her upper back and neck as well as her tailbone.  The neck pain has continued to resolve but she continues to have pain in her tailbone with certain movements such as bending forward.  She initially had some urinary frequency but this has since resolved.    Problem list and histories reviewed & adjusted, as indicated.  Additional history: as documented    Patient Active Problem List   Diagnosis     Traction detachment of retina     Hypothyroidism     CVID (common variable immunodeficiency) (H)     Hemolytic anemia (H)     B12 deficiency     CARDIOVASCULAR SCREENING; LDL GOAL LESS THAN 160     Other autoimmune hemolytic anemias (H)     Right-sided low back pain with right-sided sciatica, unspecified chronicity     Past Surgical History:   Procedure Laterality Date     C LIGATE FALLOPIAN TUBE       C THYROIDECTOMY       COLONOSCOPY N/A 4/26/2016    Procedure: COLONOSCOPY;  Surgeon: Dontae Del Rio MD;  Location:  GI      COLONOSCOPY W BIOPSY  4/26/00     HC COLONOSCOPY W BIOPSY  10/8/03     HC COLONOSCOPY W BIOPSY  6/27/05    REPEAT IN 5 YEARS      CYSTOSCOPY,INSERT URETERAL STENT      Ureteral stent insertion      FLEX SIGMOIDOSCOPY W BIOPSY  5/30/00      LAPAROSCOPY, SURGICAL; CHOLECYSTECTOMY  1992     LIGATION OF HEMORRHOID(S)      Hemorrhoidectomy     UPPER GI  ENDOSCOPY,BIOPSY  10/01/01       Social History   Substance Use Topics     Smoking status: Never Smoker     Smokeless tobacco: Never Used     Alcohol use No     Family History   Problem Relation Age of Onset     Cancer Mother       of colon cancer at age 90     Cerebrovascular Disease Father       at age 85     Dementia Brother      Dementia Brother      Dementia Brother      pancreatic cancer     Colon Cancer No family hx of            Reviewed and updated as needed this visit by clinical staff       Reviewed and updated as needed this visit by Provider         ROS:  SEE HPI.    OBJECTIVE:     /64 (BP Location: Right arm, Patient Position: Chair, Cuff Size: Adult Regular)  Pulse 74  Temp 98.7  F (37.1  C) (Tympanic)  Resp 14  Wt 150 lb 4.8 oz (68.2 kg)  SpO2 97%  BMI 19.83 kg/m2  Body mass index is 19.83 kg/(m^2).  GENERAL: healthy, alert and no distress  RESP: lungs clear to auscultation - no rales, rhonchi or wheezes  CV: regular rates and rhythm and normal S1 S2, no S3 or S4  MS: No spine tenderness or ttp of lumbar paraspinous muscles.  Negative straight leg raises.  No tto over buttocks.  PSYCH: mentation appears normal, affect normal/bright    Diagnostic Test Results:  xrays without obvious fracture.    ASSESSMENT/PLAN:   1. Pain in the coccyx  Will check xrays today.  Consider pt.  Pt agrees with plan and verbalized understanding.  - XR Sacrum and Coccyx 2 Views; Future    2. Abdominal pain, generalized  Mild, with recent urinary changes will check urine today although the urinary frequency has resolved.  Would like to make sure there is no hematuria.  - *UA reflex to Microscopic and Culture ([place] and [facility] (except [place] and [place])    SHANIQUE Walsh Ra, CNP  [facility]

## 2018-08-21 ENCOUNTER — HOSPITAL ENCOUNTER (OUTPATIENT)
Facility: CLINIC | Age: 78
Setting detail: SPECIMEN
Discharge: HOME | End: 2018-08-21
Attending: INTERNAL MEDICINE | Admitting: INTERNAL MEDICINE
Payer: MEDICARE

## 2018-08-21 ENCOUNTER — INFUSION THERAPY VISIT (OUTPATIENT)
Dept: INFUSION THERAPY | Facility: CLINIC | Age: 78
End: 2018-08-21
Attending: INTERNAL MEDICINE
Payer: MEDICARE

## 2018-08-21 VITALS
RESPIRATION RATE: 16 BRPM | OXYGEN SATURATION: 97 % | TEMPERATURE: 97.8 F | SYSTOLIC BLOOD PRESSURE: 128 MMHG | DIASTOLIC BLOOD PRESSURE: 53 MMHG | HEART RATE: 79 BPM

## 2018-08-21 DIAGNOSIS — D83.9 CVID (COMMON VARIABLE IMMUNODEFICIENCY) (H): ICD-10-CM

## 2018-08-21 DIAGNOSIS — D83.9 CVID (COMMON VARIABLE IMMUNODEFICIENCY) (H): Primary | ICD-10-CM

## 2018-08-21 DIAGNOSIS — E53.8 B12 DEFICIENCY: ICD-10-CM

## 2018-08-21 DIAGNOSIS — D59.19 OTHER AUTOIMMUNE HEMOLYTIC ANEMIAS: ICD-10-CM

## 2018-08-21 LAB
ALBUMIN SERPL-MCNC: 3.7 G/DL (ref 3.4–5)
ALP SERPL-CCNC: 129 U/L (ref 40–150)
ALT SERPL W P-5'-P-CCNC: 34 U/L (ref 0–50)
ANION GAP SERPL CALCULATED.3IONS-SCNC: 6 MMOL/L (ref 3–14)
AST SERPL W P-5'-P-CCNC: 43 U/L (ref 0–45)
BASOPHILS # BLD AUTO: 0 10E9/L (ref 0–0.2)
BASOPHILS NFR BLD AUTO: 0.4 %
BILIRUB SERPL-MCNC: 0.8 MG/DL (ref 0.2–1.3)
BUN SERPL-MCNC: 11 MG/DL (ref 7–30)
CALCIUM SERPL-MCNC: 8.2 MG/DL (ref 8.5–10.1)
CHLORIDE SERPL-SCNC: 107 MMOL/L (ref 94–109)
CO2 SERPL-SCNC: 26 MMOL/L (ref 20–32)
CREAT SERPL-MCNC: 0.68 MG/DL (ref 0.52–1.04)
DIFFERENTIAL METHOD BLD: ABNORMAL
EOSINOPHIL # BLD AUTO: 0 10E9/L (ref 0–0.7)
EOSINOPHIL NFR BLD AUTO: 0.9 %
ERYTHROCYTE [DISTWIDTH] IN BLOOD BY AUTOMATED COUNT: 14.6 % (ref 10–15)
GFR SERPL CREATININE-BSD FRML MDRD: 84 ML/MIN/1.7M2
GLUCOSE SERPL-MCNC: 85 MG/DL (ref 70–99)
HCT VFR BLD AUTO: 32.2 % (ref 35–47)
HGB BLD-MCNC: 10.8 G/DL (ref 11.7–15.7)
IMM GRANULOCYTES # BLD: 0 10E9/L (ref 0–0.4)
IMM GRANULOCYTES NFR BLD: 0.6 %
LYMPHOCYTES # BLD AUTO: 0.9 10E9/L (ref 0.8–5.3)
LYMPHOCYTES NFR BLD AUTO: 19.6 %
MCH RBC QN AUTO: 30.6 PG (ref 26.5–33)
MCHC RBC AUTO-ENTMCNC: 33.5 G/DL (ref 31.5–36.5)
MCV RBC AUTO: 91 FL (ref 78–100)
MONOCYTES # BLD AUTO: 0.8 10E9/L (ref 0–1.3)
MONOCYTES NFR BLD AUTO: 16 %
NEUTROPHILS # BLD AUTO: 2.9 10E9/L (ref 1.6–8.3)
NEUTROPHILS NFR BLD AUTO: 62.5 %
NRBC # BLD AUTO: 0 10*3/UL
NRBC BLD AUTO-RTO: 0 /100
PLATELET # BLD AUTO: 160 10E9/L (ref 150–450)
POTASSIUM SERPL-SCNC: 4.1 MMOL/L (ref 3.4–5.3)
PROT SERPL-MCNC: 6.2 G/DL (ref 6.8–8.8)
RBC # BLD AUTO: 3.53 10E12/L (ref 3.8–5.2)
RETICS # AUTO: 75.9 10E9/L (ref 25–95)
RETICS/RBC NFR AUTO: 2.2 % (ref 0.5–2)
SODIUM SERPL-SCNC: 139 MMOL/L (ref 133–144)
VIT B12 SERPL-MCNC: 783 PG/ML (ref 193–986)
WBC # BLD AUTO: 4.7 10E9/L (ref 4–11)

## 2018-08-21 PROCEDURE — 82784 ASSAY IGA/IGD/IGG/IGM EACH: CPT | Performed by: INTERNAL MEDICINE

## 2018-08-21 PROCEDURE — A9270 NON-COVERED ITEM OR SERVICE: HCPCS | Mod: GY | Performed by: INTERNAL MEDICINE

## 2018-08-21 PROCEDURE — 96365 THER/PROPH/DIAG IV INF INIT: CPT

## 2018-08-21 PROCEDURE — 85045 AUTOMATED RETICULOCYTE COUNT: CPT | Performed by: INTERNAL MEDICINE

## 2018-08-21 PROCEDURE — 96366 THER/PROPH/DIAG IV INF ADDON: CPT

## 2018-08-21 PROCEDURE — 96375 TX/PRO/DX INJ NEW DRUG ADDON: CPT

## 2018-08-21 PROCEDURE — 25000132 ZZH RX MED GY IP 250 OP 250 PS 637: Mod: GY | Performed by: INTERNAL MEDICINE

## 2018-08-21 PROCEDURE — 83010 ASSAY OF HAPTOGLOBIN QUANT: CPT | Performed by: INTERNAL MEDICINE

## 2018-08-21 PROCEDURE — 25000128 H RX IP 250 OP 636: Performed by: INTERNAL MEDICINE

## 2018-08-21 PROCEDURE — 80053 COMPREHEN METABOLIC PANEL: CPT | Performed by: INTERNAL MEDICINE

## 2018-08-21 PROCEDURE — 85025 COMPLETE CBC W/AUTO DIFF WBC: CPT | Performed by: INTERNAL MEDICINE

## 2018-08-21 PROCEDURE — 82607 VITAMIN B-12: CPT | Performed by: INTERNAL MEDICINE

## 2018-08-21 RX ORDER — ACETAMINOPHEN 325 MG/1
650 TABLET ORAL
Status: COMPLETED | OUTPATIENT
Start: 2018-08-21 | End: 2018-08-21

## 2018-08-21 RX ORDER — DIPHENHYDRAMINE HCL 25 MG
25 CAPSULE ORAL
Status: CANCELLED | OUTPATIENT
Start: 2018-08-21

## 2018-08-21 RX ORDER — ACETAMINOPHEN 325 MG/1
650 TABLET ORAL
Status: CANCELLED
Start: 2018-08-21

## 2018-08-21 RX ADMIN — ACETAMINOPHEN 650 MG: 325 TABLET ORAL at 10:47

## 2018-08-21 RX ADMIN — HUMAN IMMUNOGLOBULIN G 35 G: 20 LIQUID INTRAVENOUS at 10:58

## 2018-08-21 RX ADMIN — HYDROCORTISONE SODIUM SUCCINATE 100 MG: 100 INJECTION, POWDER, FOR SOLUTION INTRAMUSCULAR; INTRAVENOUS at 10:47

## 2018-08-21 ASSESSMENT — PAIN SCALES - GENERAL: PAINLEVEL: MODERATE PAIN (5)

## 2018-08-21 NOTE — MR AVS SNAPSHOT
After Visit Summary   8/21/2018    Tricia Sosa    MRN: [id number]           Patient Information     Date Of Birth          1940        Visit Information        Provider Department      8/21/2018 9:00 AM RH LAB DRAW 1 [facility]        Today's Diagnoses     CVID (common variable immunodeficiency) (H)        Other autoimmune hemolytic anemias (H)        B12 deficiency           Follow-ups after your visit        Your next 10 appointments already scheduled     Sep 04, 2018 10:45 AM CDT   Return Visit with Jose Summers MD   [facility] ([facility])    [facility]  [address]  [facility] [id number] [phone]            Oct 02, 2018 10:00 AM CDT   Level 5 with RH INFUSION CHAIR 6   [facility] ([facility])    [facility]  [address]  [facility] [id number] [phone]              Who to contact     If you have questions or need follow up information about today's clinic visit or your schedule please contact [facility] directly at [phone].  Normal or non-critical lab and imaging results will be communicated to you by MyChart, letter or phone within 4 business days after the clinic has received the results. If you do not hear from us within 7 days, please contact the clinic through [employer] or phone. If you have a critical or abnormal lab result, we will notify you by phone as soon as possible.  Submit refill requests through [employer] or call your pharmacy and they will forward the refill request to us. Please allow 3 business days for your refill to be completed.          Additional Information About Your Visit        MyChart Information     [employer] gives you secure access to your electronic health record. If you see a primary care provider, you can  also send messages to your care team and make appointments. If you have questions, please call your primary care clinic.  If you do not have a primary care provider, please call [phone] and they will assist you.        Care EveryWhere ID     This is your Care EveryWhere ID. This could be used by other organizations to access your [facility] medical records  [id number]         Blood Pressure from Last 3 Encounters:   08/21/18 128/53   08/15/18 122/64   07/23/18 128/66    Weight from Last 3 Encounters:   08/15/18 68.2 kg (150 lb 4.8 oz)   07/23/18 68.5 kg (151 lb)   07/10/18 69.9 kg (154 lb 1.6 oz)              We Performed the Following     CBC with platelets differential     Comprehensive metabolic panel     Haptoglobin     IgA     IgG     IgM     RETICULOCYTE COUNT     Vitamin B12        Primary Care Provider Office Phone # Fax #    Monika Eliot Whipple, APRN [facility] [phone] [address] [address] [facility] [address]        Equal Access to Services     ZIA HENRIQUEZ : Hadii aad ku hadasho Soomaali, waaxda luqadaha, qaybta kaalmada adeegyada, waxay idiin hayaan marek kharabillie ackerman . So [facility] [phone].    ATENCIÓN: Si habla español, tiene a dial disposición servicios gratuitos de asistencia lingüística. [facility] [phone].    We comply with applicable federal civil rights laws and [place] laws. We do not discriminate on the basis of race, color, national origin, age, disability, sex, sexual orientation, or gender identity.            Thank you!     Thank you for choosing [facility]  for your care. Our goal is always to provide you with excellent care. Hearing back from our patients is one way we can continue to improve our services. Please take a few minutes to complete the written survey that you may receive in the mail after your visit with us. Thank you!             Your Updated Medication List - Protect others around you: Learn how to safely use, store  and throw away your medicines at www.disposemymeds.org.          This list is accurate as of 8/21/18  4:37 PM.  Always use your most recent med list.                   Brand Name Dispense Instructions for use Diagnosis    cyanocobalamin 1000 MCG tablet    vitamin  B-12      Anemia, Back pain       Fluocinolone Acetonide Scalp 0.01 % Oil oil           folic acid 1 MG tablet    FOLVITE          ketoconazole 2 % shampoo    NIZORAL          levothyroxine 150 MCG tablet    SYNTHROID/LEVOTHROID    90 tablet    TAKE 1 TABLET BY MOUTH ONCE DAILY    Hypothyroidism, unspecified type       sulfamethoxazole-trimethoprim 800-160 MG per tablet    BACTRIM DS/SEPTRA DS    45 tablet    Take 1 pill orally on Mon, Wed and Friday    CVID (common variable immunodeficiency) (H), Other autoimmune hemolytic anemias (H), B12 deficiency

## 2018-08-21 NOTE — PROGRESS NOTES
Infusion Nursing Note:  Tricia Sosa presents today for Labs.    Patient seen by provider today: No   present during visit today: Not Applicable.    Note: Labs drawn today, but will reschedule MD avalos for next week.    Intravenous Access:  Labs drawn without difficulty.  Peripheral IV placed.    Treatment Conditions:  Not Applicable.      Post Infusion Assessment:  Blood return noted pre and post infusion.  Site patent and intact, free from redness, edema or discomfort.    Discharge Plan:   Patient discharged in stable condition accompanied by: self.  Departure Mode: Ambulatory to infusion.    Ewa High RN

## 2018-08-21 NOTE — PROGRESS NOTES
"Infusion Nursing Note:  Tricia Sosa presents today for IVIG.    Patient seen by provider today: No   present during visit today: Not Applicable.    Note: Tylenol given per request for mild pain in coccyx.  Patient is scheduled to see Neurology this afternoon for continued weakness.    Infusion started at 0.5 mL/kg/hr x15 mins   then to 1 mL/kg/hr x15 mins   then to 2 mL/kg/hr x15 mins    then to 3 mL/kg/hr x15 mins   then to 4 mL/kg/hr for the remainder of infusion    Intravenous Access:  Peripheral IV placed.    Treatment Conditions:  Rheumatology Infusion Checklist: PRIOR TO INFUSION OF BIOLOGICAL MEDICATIONS OR ANY OF THESE AS LISTED: Immune Globulin (IVIG) \".rheumbiologicalchecklist\"    Prior to Infusion of biological medications or any of these as listed:    1. Elevated temperature, fever, chills, productive cough or abnormal vital signs, night sweats, coughing up blood or sputum, no appetite or abnormal vital signs : NO    2. Open wounds or new incisions: NO    3. Recent hospitalization: NO    4.  Recent surgeries:  NO    5. Any upcoming surgeries or dental procedures?:NO    6. Any current or recent bouts of illness or infection? On any antibiotics? : Long-term Bactrim use    7. Any new, sudden or worsening abdominal pain :NO    8. Vaccination within 4 weeks? Patient or someone in the household is scheduled to receive vaccination? No live virus vaccines prior to or during treatment :NO    9. Any nervous system diseases [i.e. multiple sclerosis, Guillain-[place], seizures, neurological  changes]: NO    10. Pregnant or breast feeding; or plans on pregnancy in the future: NO    11. Signs of worsening depression or suicidal ideations while taking benlysta:NO    12. New-onset medical symptoms: NO; pain from fall 2 weeks ago, has been cleared PCP with no injury, this is only new complaint since last infusion    13.  New cancer diagnosis or on chemotherapy or radiation NO    14.  Evaluate for any sign of " active TB [Unexplained weight loss, Loss of appetite, Night sweats, Fever, Fatigue, Chills, Coughing for 3 weeks or longer, Hemoptysis (coughing up blood), Chest pain]: NO    **Note: If answered yes to any of the above, hold the infusion and contact ordering rheumatologist or on-call rheumatologist.   .    Post Infusion Assessment:  Patient tolerated infusion without incident.  Blood return noted pre and post infusion.  Site patent and intact, free from redness, edema or discomfort.  No evidence of extravasations.  Access discontinued per protocol.  Rheumatology Post Infusion: POST-INFUSION OF BIOLOGICAL MEDICATION:    Reviewed with patient.  Given biologic medication or medication hand-out. Inform patient if any fever, chills or signs of infection, new symptoms, abdominal pain, heart palpitations, shortness of breath, reaction, weakness, neurological changes, seek medical attention immediately and should not receive infusions. No live virus vaccines prior to or during treatment or up to 6 months post infusion. If the patient has an upcoming procedure or surgery, this should be discussed with the rheumatologist and surgeon or provider..    Discharge Plan:   Discharge instructions reviewed with: Patient.  Patient and/or family verbalized understanding of discharge instructions and all questions answered.  AVS to patient via [employer].  Patient will return 9/4/18 for MD f/u and 10/2/18 for IVIG for next appointments.   Patient discharged in stable condition accompanied by: self.  Departure Mode: Ambulatory.    Fadia Brown RN

## 2018-08-21 NOTE — PATIENT INSTRUCTIONS
EDUCATION POST BIOLOGICAL INFUSION  Call the triage nurse at your clinic or seek medical attention if you have chills and/or temperature greater than or equal to 100.5, uncontrolled nausea/vomiting, diarrhea, constipation, dizziness, shortness of breath, chest pain, heart palpitations, weakness or any other new or concerning symptoms, questions or concerns.  You can not have any live virus vaccines prior to or during treatment or up to 6 months post infusion.  If you have an upcoming surgery, medical procedure or dental procedure during treatment, this should be discussed with your ordering physician and your surgeon/dentist.  If you are having any concerning symptom, if you are unsure if you should get your next infusion or wish to speak to a provider before your next infusion, please call your care coordinator or triage nurse at your clinic to notify them so we can adequately serve you.

## 2018-08-21 NOTE — MR AVS SNAPSHOT
After Visit Summary   8/21/2018    Tricia Sosa    MRN: [id number]           Patient Information     Date Of Birth          1940        Visit Information        Provider Department      8/21/2018 10:00 AM RH INFUSION CHAIR 2 [facility]        Today's Diagnoses     CVID (common variable immunodeficiency) (H)    -  1      Care Instructions    EDUCATION POST BIOLOGICAL INFUSION  Call the triage nurse at your clinic or seek medical attention if you have chills and/or temperature greater than or equal to 100.5, uncontrolled nausea/vomiting, diarrhea, constipation, dizziness, shortness of breath, chest pain, heart palpitations, weakness or any other new or concerning symptoms, questions or concerns.  You can not have any live virus vaccines prior to or during treatment or up to 6 months post infusion.  If you have an upcoming surgery, medical procedure or dental procedure during treatment, this should be discussed with your ordering physician and your surgeon/dentist.  If you are having any concerning symptom, if you are unsure if you should get your next infusion or wish to speak to a provider before your next infusion, please call your care coordinator or triage nurse at your clinic to notify them so we can adequately serve you.          Follow-ups after your visit        Your next 10 appointments already scheduled     Sep 04, 2018 10:45 AM CDT   Return Visit with Jose Summers MD   [facility] ([facility])    [facility]  [address]  [facility] [address]   [phone]            Oct 02, 2018 10:00 AM CDT   Level 5 with RH INFUSION CHAIR 6   [facility] ([facility])    [facility]  [address]  [facility] [address]   [phone]              Who to contact     If you have questions or need  follow up information about today's clinic visit or your schedule please contact [facility] directly at [phone].  Normal or non-critical lab and imaging results will be communicated to you by ConnectSofthart, letter or phone within 4 business days after the clinic has received the results. If you do not hear from us within 7 days, please contact the clinic through ConnectSofthart or phone. If you have a critical or abnormal lab result, we will notify you by phone as soon as possible.  Submit refill requests through watAgame or call your pharmacy and they will forward the refill request to us. Please allow 3 business days for your refill to be completed.          Additional Information About Your Visit        [employer] Information     watAgame gives you secure access to your electronic health record. If you see a primary care provider, you can also send messages to your care team and make appointments. If you have questions, please call your primary care clinic.  If you do not have a primary care provider, please call [phone] and they will assist you.        Care EveryWhere ID     This is your Care EveryWhere ID. This could be used by other organizations to access your [facility] medical records  [id number]        Your Vitals Were     Pulse Temperature Respirations Pulse Oximetry          79 97.8  F (36.6  C) 16 97%         Blood Pressure from Last 3 Encounters:   08/21/18 128/53   08/15/18 122/64   07/23/18 128/66    Weight from Last 3 Encounters:   08/15/18 68.2 kg (150 lb 4.8 oz)   07/23/18 68.5 kg (151 lb)   07/10/18 69.9 kg (154 lb 1.6 oz)              Today, you had the following     No orders found for display       Primary Care Provider Office Phone # Fax #    SHANIQUE [facility] [phone] [address] [place] [address]        Equal Access to Services     ZIA HENRIQUEZ : Joey Serna, quentinda deepak, qaybta kabarbara wolff,  naya thaomert cao'aan ah. So [facility] [phone].    ATENCIÓN: Si jr salas, tiene a dial disposición servicios gratuitos de asistencia lingüística. Carlos Eduardo pandey [phone].    We comply with applicable federal civil rights laws and [place] laws. We do not discriminate on the basis of race, color, national origin, age, disability, sex, sexual orientation, or gender identity.            Thank you!     Thank you for choosing [facility]  for your care. Our goal is always to provide you with excellent care. Hearing back from our patients is one way we can continue to improve our services. Please take a few minutes to complete the written survey that you may receive in the mail after your visit with us. Thank you!             Your Updated Medication List - Protect others around you: Learn how to safely use, store and throw away your medicines at www.disposemymeds.org.          This list is accurate as of 8/21/18  1:16 PM.  Always use your most recent med list.                   Brand Name Dispense Instructions for use Diagnosis    cyanocobalamin 1000 MCG tablet    vitamin  B-12      Anemia, Back pain       Fluocinolone Acetonide Scalp 0.01 % Oil oil           folic acid 1 MG tablet    FOLVITE          ketoconazole 2 % shampoo    NIZORAL          levothyroxine 150 MCG tablet    SYNTHROID/LEVOTHROID    90 tablet    TAKE 1 TABLET BY MOUTH ONCE DAILY    Hypothyroidism, unspecified type       sulfamethoxazole-trimethoprim 800-160 MG per tablet    BACTRIM DS/SEPTRA DS    45 tablet    Take 1 pill orally on Mon, Wed and Friday    CVID (common variable immunodeficiency) (H), Other autoimmune hemolytic anemias (H), B12 deficiency

## 2018-08-22 LAB
HAPTOGLOB SERPL-MCNC: <6 MG/DL (ref 35–175)
IGA SERPL-MCNC: <7 MG/DL (ref 70–380)
IGG SERPL-MCNC: 659 MG/DL (ref 695–1620)
IGM SERPL-MCNC: 98 MG/DL (ref 60–265)

## 2018-09-04 ENCOUNTER — ONCOLOGY VISIT (OUTPATIENT)
Dept: ONCOLOGY | Facility: CLINIC | Age: 78
End: 2018-09-04
Attending: INTERNAL MEDICINE
Payer: MEDICARE

## 2018-09-04 VITALS
BODY MASS INDEX: 20.18 KG/M2 | DIASTOLIC BLOOD PRESSURE: 68 MMHG | WEIGHT: 149 LBS | SYSTOLIC BLOOD PRESSURE: 118 MMHG | HEIGHT: 72 IN | TEMPERATURE: 98.2 F | OXYGEN SATURATION: 98 % | HEART RATE: 74 BPM

## 2018-09-04 DIAGNOSIS — E53.8 B12 DEFICIENCY: ICD-10-CM

## 2018-09-04 DIAGNOSIS — D59.19 OTHER AUTOIMMUNE HEMOLYTIC ANEMIAS: Primary | ICD-10-CM

## 2018-09-04 DIAGNOSIS — D83.9 CVID (COMMON VARIABLE IMMUNODEFICIENCY) (H): ICD-10-CM

## 2018-09-04 PROCEDURE — G0463 HOSPITAL OUTPT CLINIC VISIT: HCPCS

## 2018-09-04 PROCEDURE — 99213 OFFICE O/P EST LOW 20 MIN: CPT | Performed by: INTERNAL MEDICINE

## 2018-09-04 ASSESSMENT — PAIN SCALES - GENERAL: PAINLEVEL: NO PAIN (0)

## 2018-09-04 NOTE — PATIENT INSTRUCTIONS
IV Ig  infusions every 4 weeks (she received last infusion about 2 weeks ago)-Scheduled. Fadumo TALLEY      Follow up in 10 weeks with labs prior to visit to see me-Per pt request to see Dr. Summers on 11/13/18, ok'd per Dr. Summers. Fadumo DO.      CBC w Diff, CMP and LDH; B12, retic, ferritin, iron panel, Ig subsets, haptoglobin-Pt will schedule appt at [facility], orders faxed. Fadumo DO.   AVS printed and given to Pt. Fadumo DO.

## 2018-09-04 NOTE — PROGRESS NOTES
[facility] CANCER CLINIC  FOLLOW-UP VISIT NOTE    PATIENT NAME: Tricia Sosa MRN # [id number]  DATE OF VISIT: Sep 4, 2018 YOB: 1940    REFERRING PROVIDER: No referring provider defined for this encounter.    DIAGNOSIS: Hemolytic anemia-autoimmune; IgA deficiency    TREATMENT SUMMARY:  Tricia has been diagnosed with IgA deficiency since her around 2000.  She was very sick at the time and had severe diarrhea.  She lost about 40 pounds in weight.  The workup revealed that she had markedly diminished CD4 counts of 48 and was diagnosed with CMV colitis.  Since then she has been followed in hematology clinic at [facility] until recently.  She was noted to have anemia which was fairly long-standing.  She was initially referred for anemia in 2004 and also had a bone marrow biopsy done at that time which revealed hypercellular bone marrow with 70-80% cellularity and normal trilineage hematopoiesis and no morphologic features of MDS or lymphoproliferation.  Her anemia was attributed to her chronic underlying disease.  At the next evaluation in 2002 on 4 aggressive anemia there was no evidence of hemolysis, iron deficiency, B12 or folate deficiency.  Bone marrow biopsy was not pursued.  In summer of 2015 she had progressive fatigue, dyspnea on exertion and worsening anemia with a hemoglobin now of 9.  Workup at this time did suggest a hemolytic anemia with elevated LDH at 709, undetectable haptoglobin and elevated unconjugated bilirubin at 3.3.  Monospecific MARIKA was positive for both IgG and complement.  Cold agglutinin screen was positive with very low titer 1:64.  She was started on prednisone 60 mg daily with supplementation of iron folate and B12 starting 7/31/15.  Her prednisone has been slowly tapered and was discontinued off in January 2016.  She was last followed at [facility] on March 10 when she had stable hemoglobin.      SUBJECTIVE   Tricia comes alone for this clinic visit for  "her hemolytic anemia.    Tricia is doing well overall.  She continues to have some fatigue.  But on direct questioning she notes that her fatigue has not changed over the past several years.  She has difficulty even walking from the parking lot and has requested for a handicap parking sticker from her primary care physician.  More recently she has undergone some weight loss-about 10 pounds over the last 3 months.  She notes her son and several family members have asked her if she has been losing weight.  She has about the same appetite and has been eating the same.    She is here with labs done prior to clinic visit.       PAST MEDICAL HISTORY   1. Common variable immunodeficiency syndrome  2. 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 Selective IgA deficiency  4. Leukopenia with markedly low CD4 counts on Bactrim prophylaxis  5. History of fall with subdural hematoma in 6/11/14 with complete resolution  6. Hashimoto's disease status post partial thyroidectomy      CURRENT OUTPATIENT MEDICATIONS     Current Outpatient Prescriptions   Medication Sig     cyanocobalamin (VITAMIN  B-12) 1000 MCG tablet      FLUOCINOLONE ACETONIDE SCALP 0.01 % OIL oil      folic acid (FOLVITE) 1 MG tablet      levothyroxine (SYNTHROID/LEVOTHROID) 150 MCG tablet TAKE 1 TABLET BY MOUTH ONCE DAILY     sulfamethoxazole-trimethoprim (BACTRIM DS/SEPTRA DS) 800-160 MG per tablet Take 1 pill orally on Mon, Wed and Friday     No current facility-administered medications for this visit.         ALLERGIES     Allergies   Allergen Reactions     Doxycycline         REVIEW OF SYSTEMS   As above in the HPI, o/w complete 12-point ROS was negative.     PHYSICAL EXAM   /68  Pulse 74  Temp 98.2  F (36.8  C) (Tympanic)  Ht 1.854 m (6' 1\")  Wt 67.6 kg (149 lb)  SpO2 98%  BMI 19.66 kg/m2    SpO2 Readings from Last 4 Encounters:   09/04/18 98%   08/21/18 97%   08/15/18 97%   07/23/18 97%     Wt Readings " from Last 3 Encounters:   09/04/18 67.6 kg (149 lb)   08/15/18 68.2 kg (150 lb 4.8 oz)   07/23/18 68.5 kg (151 lb)     GEN: NAD  HEENT: PERRL, EOMI, no icterus, injection or pallor. Oropharynx is clear.  NECK: no cervical or supraclavicular lymphadenopathy  LUNGS: clear bilaterally  CV: regular, no murmurs, rubs, or gallops  ABDOMEN: soft, non-tender, non-distended, normal bowel sounds, no hepatosplenomegaly by percussion or palpation  EXT: warm, well perfused, no edema  NEURO: alert  SKIN: no rashes     LABORATORY AND IMAGING STUDIES     Recent Labs   Lab Test  08/21/18   1012  05/23/18   0959  02/27/18   0931  12/04/17   1454  10/20/17   1039   NA  139  140  138  139  137   POTASSIUM  4.1  4.2  4.0  4.0  4.4   CHLORIDE  107  107  105  106  104   CO2  26  26  29  29  29   ANIONGAP  6  7  4  4  4   BUN  11  13  14  12  12   CR  0.68  0.69  0.81  0.69  0.61   GLC  85  105*  82  83  73   CULLEN  8.2*  8.4*  8.5  8.6  8.6     No results for input(s): MAG, PHOS in the last 49669 hours.  Recent Labs   Lab Test  08/21/18   1012  05/23/18   0959  02/27/18   0931  12/04/17   1454  10/20/17   1039   WBC  4.7  4.9  4.7  4.5  4.8   HGB  10.8*  11.7  12.4  11.8  12.4   PLT  160  158  154  166  155   MCV  91  89  89  89  89   NEUTROPHIL  62.5  62.4  61.2  62.4  64.5     Recent Labs   Lab Test  08/21/18   1012  05/23/18   0959  02/27/18   0931  12/04/17   1454   BILITOTAL  0.8  0.8  0.9  0.7   ALKPHOS  129  113  114  117   ALT  34  31  25  35   AST  43  42  46*  50*   ALBUMIN  3.7  3.9  4.1  3.9   LDH   --   348*  305*  362*     TSH   Date Value Ref Range Status   10/20/2017 1.90 0.40 - 4.00 mU/L Final   06/19/2017 1.82 0.40 - 4.00 mU/L Final   04/03/2017 6.29 (H) 0.40 - 4.00 mU/L Final     No results for input(s): CEA in the last 79311 hours.  Results for orders placed or performed in visit on 08/15/18   XR Sacrum and Coccyx 2 Views    Narrative    SACRUM AND COCCYX TWO VIEWS  8/15/2018 12:27 PM    HISTORY:  Pain in the  coccyx.    COMPARISON: May 8, 2017      Impression    IMPRESSION: No definite sacral fracture. No definite coccygeal  fracture or dislocation.    AXEL PRADO MD     Recent Labs   Lab Test  08/21/18   1012  05/23/18   0959  02/27/18   0931  12/04/17   1454  10/20/17   1039  09/12/17   0828   04/14/15   1116   B12  783  802  687  825   --   636   < >  277   FOLIC   --    --    --    --    --    --    --   10.7   HGB  10.8*  11.7  12.4  11.8  12.4  11.4*   < >   --    RETICABSCT  75.9  61.8  59.9  52.0   --   53.8   < >   --    RETP  2.2*  1.6  1.5  1.3   --   1.4   < >   --     < > = values in this interval not displayed.     Recent Labs   Lab Test  05/23/18   0959  02/27/18   0931  12/04/17   1454  04/03/17   1132  02/06/17   1351   MARIA ALEJANDRA  12  14  13  44  45   IRON  67  74  59  117  75   FEB  356  401  398  339  325   IRONSAT  19  18  15  35  23     Results for TRICIA MARRUFO (MRN [id number]) as of 9/4/2018 11:15   Ref. Range 12/4/2017 14:54 2/27/2018 09:31 5/23/2018 09:59 8/21/2018 10:12   Haptoglobin Latest Ref Range: 35 - 175 mg/dL <6 (L) <6 (L) <6 (L) <6 (L)   IGA Latest Ref Range: 70 - 380 mg/dL    <7 (L)   IGG Latest Ref Range: 695 - 1620 mg/dL 985 878 719 659 (L)   IGM Latest Ref Range: 60 - 265 mg/dL 72 78 58 (L) 98        ASSESSMENT AND PLAN   1. 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 Common variable immunodeficiency disorder  4. Splenomegaly    Tricia continues to have fatigue which has been bothering her more.  However she also notes that she has not noticed any significant change in her fatigue.  She has noted about 10 pound unintentional weight loss over the last 3 months.  She notes that her son and several other family members have been more worried about her than she is about herself.  Her diet has not changed over the last 3 months.    I reviewed all her labs with her. They have been relatively  stable since last visit. For the most important one - her Hgb has declined to 10.8 g/dl this time. She continues to have no Ig A, though her Ig G/Ig M are stable with replacement. She continues to hemolyze and has undetectable haptoglobin.  It is quite likely that this decline in hemoglobin is because we spaced out her IVIG transfusions to every 6 weeks instead of every 4 weeks.  I am not sure if her fatigue and weight loss are also related.    I will move her infusions back to every 4 weeks from the current every 6 weeks as she has declining hemoglobin and it has dropped by 2 g/dL over the last 6 months (since when we have spaced out her infusions to every 6 weeks).     She was originally worried that her insurance would change the next year and she would have a much higher co-pay for each IVIG infusion.  We could try some alternative infusions/chemotherapies but they may also be quite expensive and carry a high co-pay.    I will see her in 12 weeks with labs.        Jose Summers  Adj ,  Division of Hematology, Oncology & Transplantation  [facility].

## 2018-09-04 NOTE — NURSING NOTE
"Oncology Rooming Note    September 4, 2018 10:44 AM   Tricia Sosa is a 77 year old female who presents for:    Chief Complaint   Patient presents with     Oncology Clinic Visit     CVID (common variable immunodeficiency)     Initial Vitals: /68  Pulse 74  Temp 98.2  F (36.8  C) (Tympanic)  Ht 1.854 m (6' 1\")  Wt 67.6 kg (149 lb)  SpO2 98%  BMI 19.66 kg/m2 Estimated body mass index is 19.66 kg/(m^2) as calculated from the following:    Height as of this encounter: 1.854 m (6' 1\").    Weight as of this encounter: 67.6 kg (149 lb). Body surface area is 1.87 meters squared.  No Pain (0) Comment: Data Unavailable   No LMP recorded. Patient is postmenopausal.  Allergies reviewed: Yes  Medications reviewed: Yes    Medications: Medication refills not needed today.  Pharmacy name entered into Safari[employer]k:    [id number] - [place], [place] - [address] [facility] [address] [facility]    Clinical concerns: CVID (common variable immunodeficiency)     8 minutes for nursing intake (face to face time)     Tricia Marshall CMA            DISCHARGE PLAN:  Next appointments: See patient instruction section  Departure Mode: Ambulatory  Accompanied by: self  0 minutes for nursing discharge (face to face time)   Tricia Marshall CMA      "

## 2018-09-04 NOTE — LETTER
9/4/2018         RE: Tricia Sosa  [address]  [place] [address]        Dear Colleague,    Thank you for referring your patient, Tricia Sosa, to the [facility] CANCER CARE. Please see a copy of my visit note below.    [facility] CANCER CLINIC  FOLLOW-UP VISIT NOTE    PATIENT NAME: Tricia Sosa MRN # [id number]  DATE OF VISIT: Sep 4, 2018 YOB: 1940    REFERRING PROVIDER: No referring provider defined for this encounter.    DIAGNOSIS: Hemolytic anemia-autoimmune; IgA deficiency    TREATMENT SUMMARY:  Tricia has been diagnosed with IgA deficiency since her around 2000.  She was very sick at the time and had severe diarrhea.  She lost about 40 pounds in weight.  The workup revealed that she had markedly diminished CD4 counts of 48 and was diagnosed with CMV colitis.  Since then she has been followed in hematology clinic at [facility] until recently.  She was noted to have anemia which was fairly long-standing.  She was initially referred for anemia in 2004 and also had a bone marrow biopsy done at that time which revealed hypercellular bone marrow with 70-80% cellularity and normal trilineage hematopoiesis and no morphologic features of MDS or lymphoproliferation.  Her anemia was attributed to her chronic underlying disease.  At the next evaluation in 2002 on 4 aggressive anemia there was no evidence of hemolysis, iron deficiency, B12 or folate deficiency.  Bone marrow biopsy was not pursued.  In summer of 2015 she had progressive fatigue, dyspnea on exertion and worsening anemia with a hemoglobin now of 9.  Workup at this time did suggest a hemolytic anemia with elevated LDH at 709, undetectable haptoglobin and elevated unconjugated bilirubin at 3.3.  Monospecific MARIKA was positive for both IgG and complement.  Cold agglutinin screen was positive with very low titer 1:64.  She was started on prednisone 60 mg daily with supplementation of iron folate  and B12 starting 7/31/15.  Her prednisone has been slowly tapered and was discontinued off in January 2016.  She was last followed at [facility] on March 10 when she had stable hemoglobin.      SUBJECTIVE   Tricia comes alone for this clinic visit for her hemolytic anemia.    Tricia is doing well overall.  She continues to have some fatigue.  But on direct questioning she notes that her fatigue has not changed over the past several years.  She has difficulty even walking from the parking lot and has requested for a handicap parking sticker from her primary care physician.  More recently she has undergone some weight loss-about 10 pounds over the last 3 months.  She notes her son and several family members have asked her if she has been losing weight.  She has about the same appetite and has been eating the same.    She is here with labs done prior to clinic visit.       PAST MEDICAL HISTORY   1. Common variable immunodeficiency syndrome  2. 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 Selective IgA deficiency  4. Leukopenia with markedly low CD4 counts on Bactrim prophylaxis  5. History of fall with subdural hematoma in 6/11/14 with complete resolution  6. Hashimoto's disease status post partial thyroidectomy      CURRENT OUTPATIENT MEDICATIONS     Current Outpatient Prescriptions   Medication Sig     cyanocobalamin (VITAMIN  B-12) 1000 MCG tablet      FLUOCINOLONE ACETONIDE SCALP 0.01 % OIL oil      folic acid (FOLVITE) 1 MG tablet      levothyroxine (SYNTHROID/LEVOTHROID) 150 MCG tablet TAKE 1 TABLET BY MOUTH ONCE DAILY     sulfamethoxazole-trimethoprim (BACTRIM DS/SEPTRA DS) 800-160 MG per tablet Take 1 pill orally on Mon, Wed and Friday     No current facility-administered medications for this visit.         ALLERGIES     Allergies   Allergen Reactions     Doxycycline         REVIEW OF SYSTEMS   As above in the HPI, o/w complete 12-point ROS was negative.     PHYSICAL  "EXAM   /68  Pulse 74  Temp 98.2  F (36.8  C) (Tympanic)  Ht 1.854 m (6' 1\")  Wt 67.6 kg (149 lb)  SpO2 98%  BMI 19.66 kg/m2    SpO2 Readings from Last 4 Encounters:   09/04/18 98%   08/21/18 97%   08/15/18 97%   07/23/18 97%     Wt Readings from Last 3 Encounters:   09/04/18 67.6 kg (149 lb)   08/15/18 68.2 kg (150 lb 4.8 oz)   07/23/18 68.5 kg (151 lb)     GEN: NAD  HEENT: PERRL, EOMI, no icterus, injection or pallor. Oropharynx is clear.  NECK: no cervical or supraclavicular lymphadenopathy  LUNGS: clear bilaterally  CV: regular, no murmurs, rubs, or gallops  ABDOMEN: soft, non-tender, non-distended, normal bowel sounds, no hepatosplenomegaly by percussion or palpation  EXT: warm, well perfused, no edema  NEURO: alert  SKIN: no rashes     LABORATORY AND IMAGING STUDIES     Recent Labs   Lab Test  08/21/18   1012  05/23/18   0959  02/27/18   0931  12/04/17   1454  10/20/17   1039   NA  139  140  138  139  137   POTASSIUM  4.1  4.2  4.0  4.0  4.4   CHLORIDE  107  107  105  106  104   CO2  26  26  29  29  29   ANIONGAP  6  7  4  4  4   BUN  11  13  14  12  12   CR  0.68  0.69  0.81  0.69  0.61   GLC  85  105*  82  83  73   CULLEN  8.2*  8.4*  8.5  8.6  8.6     No results for input(s): MAG, PHOS in the last 92099 hours.  Recent Labs   Lab Test  08/21/18   1012  05/23/18   0959  02/27/18   0931  12/04/17   1454  10/20/17   1039   WBC  4.7  4.9  4.7  4.5  4.8   HGB  10.8*  11.7  12.4  11.8  12.4   PLT  160  158  154  166  155   MCV  91  89  89  89  89   NEUTROPHIL  62.5  62.4  61.2  62.4  64.5     Recent Labs   Lab Test  08/21/18   1012  05/23/18   0959  02/27/18   0931  12/04/17   1454   BILITOTAL  0.8  0.8  0.9  0.7   ALKPHOS  129  113  114  117   ALT  34  31  25  35   AST  43  42  46*  50*   ALBUMIN  3.7  3.9  4.1  3.9   LDH   --   348*  305*  362*     TSH   Date Value Ref Range Status   10/20/2017 1.90 0.40 - 4.00 mU/L Final   06/19/2017 1.82 0.40 - 4.00 mU/L Final   04/03/2017 6.29 (H) 0.40 - 4.00 mU/L " Final     No results for input(s): CEA in the last 61774 hours.  Results for orders placed or performed in visit on 08/15/18   XR Sacrum and Coccyx 2 Views    Narrative    SACRUM AND COCCYX TWO VIEWS  8/15/2018 12:27 PM    HISTORY:  Pain in the coccyx.    COMPARISON: May 8, 2017      Impression    IMPRESSION: No definite sacral fracture. No definite coccygeal  fracture or dislocation.    AXEL PRADO MD     Recent Labs   Lab Test  08/21/18   1012  05/23/18   0959  02/27/18   0931  12/04/17   1454  10/20/17   1039  09/12/17   0828   04/14/15   1116   B12  783  802  687  825   --   636   < >  277   FOLIC   --    --    --    --    --    --    --   10.7   HGB  10.8*  11.7  12.4  11.8  12.4  11.4*   < >   --    RETICABSCT  75.9  61.8  59.9  52.0   --   53.8   < >   --    RETP  2.2*  1.6  1.5  1.3   --   1.4   < >   --     < > = values in this interval not displayed.     Recent Labs   Lab Test  05/23/18   0959  02/27/18   0931  12/04/17   1454  04/03/17   1132  02/06/17   1351   MARIA ALEJANDRA  12  14  13  44  45   IRON  67  74  59  117  75   FEB  356  401  398  339  325   IRONSAT  19  18  15  35  23     Results for TRICIA MARRUFO (MRN [id number]) as of 9/4/2018 11:15   Ref. Range 12/4/2017 14:54 2/27/2018 09:31 5/23/2018 09:59 8/21/2018 10:12   Haptoglobin Latest Ref Range: 35 - 175 mg/dL <6 (L) <6 (L) <6 (L) <6 (L)   IGA Latest Ref Range: 70 - 380 mg/dL    <7 (L)   IGG Latest Ref Range: 695 - 1620 mg/dL 985 878 719 659 (L)   IGM Latest Ref Range: 60 - 265 mg/dL 72 78 58 (L) 98        ASSESSMENT AND PLAN   1. 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 Common variable immunodeficiency disorder  4. Splenomegaly    Tricia continues to have fatigue which has been bothering her more.  However she also notes that she has not noticed any significant change in her fatigue.  She has noted about 10 pound unintentional weight loss over the last  3 months.  She notes that her son and several other family members have been more worried about her than she is about herself.  Her diet has not changed over the last 3 months.    I reviewed all her labs with her. They have been relatively stable since last visit. For the most important one - her Hgb has declined to 10.8 g/dl this time. She continues to have no Ig A, though her Ig G/Ig M are stable with replacement. She continues to hemolyze and has undetectable haptoglobin.  It is quite likely that this decline in hemoglobin is because we spaced out her IVIG transfusions to every 6 weeks instead of every 4 weeks.  I am not sure if her fatigue and weight loss are also related.    I will move her infusions back to every 4 weeks from the current every 6 weeks as she has declining hemoglobin and it has dropped by 2 g/dL over the last 6 months (since when we have spaced out her infusions to every 6 weeks).     She was originally worried that her insurance would change the next year and she would have a much higher co-pay for each IVIG infusion.  We could try some alternative infusions/chemotherapies but they may also be quite expensive and carry a high co-pay.    I will see her in 12 weeks with labs.        Jose Summers  Adj ,  Division of Hematology, Oncology & Transplantation  [facility].        Again, thank you for allowing me to participate in the care of your patient.        Sincerely,        Jose Summers MD

## 2018-09-04 NOTE — MR AVS SNAPSHOT
After Visit Summary   9/4/2018    Tricia Sosa    MRN: [id number]           Patient Information     Date Of Birth          1940        Visit Information        Provider Department      9/4/2018 10:45 AM Jose Summers MD [facility]        Today's Diagnoses     Other autoimmune hemolytic anemias (H)    -  1    CVID (common variable immunodeficiency) (H)        B12 deficiency          Care Instructions    IV Ig  infusions every 4 weeks (she received last infusion about 2 weeks ago)      Follow up in 10 weeks with labs prior to visit to see me      CBC w Diff, CMP and LDH; B12, retic, ferritin, iron panel, Ig subsets, haptoglobin            Follow-ups after your visit        Your next 10 appointments already scheduled     Sep 19, 2018  9:00 AM CDT   Level 6 with RH INFUSION CHAIR 5   [facility] ([facility])    [facility]  [id number] [place] [address]  [address]  [facility] [address]   [phone]            Oct 16, 2018 10:00 AM CDT   Level 6 with RH INFUSION CHAIR 8   [facility] ([facility])    [facility]  [address]  [facility] [address]   [phone]            Nov 13, 2018  9:45 AM CST   Return Visit with Jose Summers MD   [facility] ([facility])    [facility]  [address]  [facility] [address]   [phone]            Nov 13, 2018 10:30 AM CST   Level 5 with RH INFUSION CHAIR 2   [facility] ([facility])    [facility]  [address]  [place] [address]   [phone]              Future tests that were ordered for you today     Open Standing Orders        Priority Remaining Interval Expires Ordered    Lactate Dehydrogenase Routine 25/25   "9/4/2019 9/4/2018            Who to contact     If you have questions or need follow up information about today's clinic visit or your schedule please contact [facility] directly at [phone].  Normal or non-critical lab and imaging results will be communicated to you by MyChart, letter or phone within 4 business days after the clinic has received the results. If you do not hear from us within 7 days, please contact the clinic through CREATIVhart or phone. If you have a critical or abnormal lab result, we will notify you by phone as soon as possible.  Submit refill requests through Modus[employer] or call your pharmacy and they will forward the refill request to us. Please allow 3 business days for your refill to be completed.          Additional Information About Your Visit        CREATIVharWILEX Information     Mod[employer] gives you secure access to your electronic health record. If you see a primary care provider, you can also send messages to your care team and make appointments. If you have questions, please call your primary care clinic.  If you do not have a primary care provider, please call [phone] and they will assist you.        Care EveryWhere ID     This is your Care EveryWhere ID. This could be used by other organizations to access your [facility] medical records  [id number]        Your Vitals Were     Pulse Temperature Height Pulse Oximetry BMI (Body Mass Index)       74 98.2  F (36.8  C) (Tympanic) 1.854 m (6' 1\") 98% 19.66 kg/m2        Blood Pressure from Last 3 Encounters:   09/04/18 118/68   08/21/18 128/53   08/15/18 122/64    Weight from Last 3 Encounters:   09/04/18 67.6 kg (149 lb)   08/15/18 68.2 kg (150 lb 4.8 oz)   07/23/18 68.5 kg (151 lb)              We Performed the Following     CBC with platelets differential     Comprehensive metabolic panel     Haptoglobin     IgA     IgG     IgM     RETICULOCYTE COUNT     Vitamin B12          Today's Medication Changes          These " changes are accurate as of 9/4/18 11:44 AM.  If you have any questions, ask your nurse or doctor.               Stop taking these medicines if you haven't already. Please contact your care team if you have questions.     ketoconazole 2 % shampoo   Commonly known as:  NIZORAL   Stopped by:  Jose Summers MD                    Primary Care Provider Office Phone # Fax #    SHANIQUE Walsh [facility] [phone] [address] [facility]  [facility] [address]        Equal Access to Services     [facility]: Hadii aad ku hadasho Soomaali, waaxda luqadaha, qaybta kaalmada adeegyada, waxay idiin hayaan ademert kharabillie ackerman . So [facility] [phone].    ATENCIÓN: Si habla español, tiene a dial disposición servicios gratuitos de asistencia lingüística. Llame al [phone].    We comply with applicable federal civil rights laws and [place] laws. We do not discriminate on the basis of race, color, national origin, age, disability, sex, sexual orientation, or gender identity.            Thank you!     Thank you for choosing [facility]CER [facility]  for your care. Our goal is always to provide you with excellent care. Hearing back from our patients is one way we can continue to improve our services. Please take a few minutes to complete the written survey that you may receive in the mail after your visit with us. Thank you!             Your Updated Medication List - Protect others around you: Learn how to safely use, store and throw away your medicines at www.disposemymeds.org.          This list is accurate as of 9/4/18 11:44 AM.  Always use your most recent med list.                   Brand Name Dispense Instructions for use Diagnosis    cyanocobalamin 1000 MCG tablet    vitamin  B-12      Anemia, Back pain       Fluocinolone Acetonide Scalp 0.01 % Oil oil           folic acid 1 MG tablet    FOLVITE          levothyroxine 150 MCG tablet    SYNTHROID/LEVOTHROID    90 tablet    TAKE 1 TABLET BY  MOUTH ONCE DAILY    Hypothyroidism, unspecified type       sulfamethoxazole-trimethoprim 800-160 MG per tablet    BACTRIM DS/SEPTRA DS    45 tablet    Take 1 pill orally on Mon, Wed and Friday    CVID (common variable immunodeficiency) (H), Other autoimmune hemolytic anemias (H), B12 deficiency

## 2018-09-05 DIAGNOSIS — D83.9 CVID (COMMON VARIABLE IMMUNODEFICIENCY) (H): ICD-10-CM

## 2018-09-05 DIAGNOSIS — D59.19 OTHER AUTOIMMUNE HEMOLYTIC ANEMIAS: Primary | ICD-10-CM

## 2018-09-05 DIAGNOSIS — E53.8 B12 DEFICIENCY: ICD-10-CM

## 2018-09-19 ENCOUNTER — INFUSION THERAPY VISIT (OUTPATIENT)
Dept: INFUSION THERAPY | Facility: CLINIC | Age: 78
End: 2018-09-19
Attending: INTERNAL MEDICINE
Payer: MEDICARE

## 2018-09-19 VITALS
WEIGHT: 149.47 LBS | DIASTOLIC BLOOD PRESSURE: 67 MMHG | TEMPERATURE: 97 F | SYSTOLIC BLOOD PRESSURE: 123 MMHG | BODY MASS INDEX: 19.72 KG/M2 | OXYGEN SATURATION: 100 % | RESPIRATION RATE: 16 BRPM | HEART RATE: 68 BPM

## 2018-09-19 DIAGNOSIS — D83.9 CVID (COMMON VARIABLE IMMUNODEFICIENCY) (H): Primary | ICD-10-CM

## 2018-09-19 PROCEDURE — 25000128 H RX IP 250 OP 636: Performed by: INTERNAL MEDICINE

## 2018-09-19 PROCEDURE — 96365 THER/PROPH/DIAG IV INF INIT: CPT

## 2018-09-19 PROCEDURE — 96375 TX/PRO/DX INJ NEW DRUG ADDON: CPT

## 2018-09-19 PROCEDURE — 96366 THER/PROPH/DIAG IV INF ADDON: CPT

## 2018-09-19 RX ORDER — ACETAMINOPHEN 325 MG/1
650 TABLET ORAL
Status: CANCELLED
Start: 2018-09-19

## 2018-09-19 RX ORDER — DIPHENHYDRAMINE HCL 25 MG
25 CAPSULE ORAL
Status: CANCELLED | OUTPATIENT
Start: 2018-09-19

## 2018-09-19 RX ADMIN — HYDROCORTISONE SODIUM SUCCINATE 100 MG: 100 INJECTION, POWDER, FOR SOLUTION INTRAMUSCULAR; INTRAVENOUS at 09:18

## 2018-09-19 RX ADMIN — HUMAN IMMUNOGLOBULIN G 35 G: 20 LIQUID INTRAVENOUS at 09:23

## 2018-09-19 NOTE — PROGRESS NOTES
"Infusion Nursing Note:  Tricia Sosa presents today for IVIG.    Patient seen by provider today: No   present during visit today: Not Applicable.    Note: IVIG increased by 1 ml/kg/hr every 15 minutes with max of 4 ml/kg/hr.    Intravenous Access:  Peripheral IV placed.    Treatment Conditions:  Rheumatology Infusion Checklist: PRIOR TO INFUSION OF BIOLOGICAL MEDICATIONS OR ANY OF THESE AS LISTED: Immune Globulin (IVIG) \".rheumbiologicalchecklist\"    Prior to Infusion of biological medications or any of these as listed:    1. Elevated temperature, fever, chills, productive cough or abnormal vital signs, night sweats, coughing up blood or sputum, no appetite or abnormal vital signs : NO    2. Open wounds or new incisions: NO    3. Recent hospitalization: NO    4.  Recent surgeries:  NO    5. Any upcoming surgeries or dental procedures?:NO    6. Any current or recent bouts of illness or infection? On any antibiotics? : NO    7. Any new, sudden or worsening abdominal pain :NO    8. Vaccination within 4 weeks? Patient or someone in the household is scheduled to receive vaccination? No live virus vaccines prior to or during treatment :NO    9. Any nervous system diseases [i.e. multiple sclerosis, Guillain-[place], seizures, neurological  changes]: NO    10. Pregnant or breast feeding; or plans on pregnancy in the future: NO    11. Signs of worsening depression or suicidal ideations while taking benlysta:NO    12. New-onset medical symptoms: NO    13.  New cancer diagnosis or on chemotherapy or radiation NO    14.  Evaluate for any sign of active TB [Unexplained weight loss, Loss of appetite, Night sweats, Fever, Fatigue, Chills, Coughing for 3 weeks or longer, Hemoptysis (coughing up blood), Chest pain]: NO    **Note: If answered yes to any of the above, hold the infusion and contact ordering rheumatologist or on-call rheumatologist.   .      Post Infusion Assessment:  Patient tolerated infusion without " incident.  Site patent and intact, free from redness, edema or discomfort.  No evidence of extravasations.  Access discontinued per protocol.  Rheumatology Post Infusion: POST-INFUSION OF BIOLOGICAL MEDICATION:    Reviewed with patient.  Given biologic medication or medication hand-out. Inform patient if any fever, chills or signs of infection, new symptoms, abdominal pain, heart palpitations, shortness of breath, reaction, weakness, neurological changes, seek medical attention immediately and should not receive infusions. No live virus vaccines prior to or during treatment or up to 6 months post infusion. If the patient has an upcoming procedure or surgery, this should be discussed with the rheumatologist and surgeon or provider..    Discharge Plan:   Patient declined prescription refills.  Discharge instructions reviewed with: Patient.  Patient and/or family verbalized understanding of discharge instructions and all questions answered.  Patient discharged in stable condition accompanied by: self.  Departure Mode: Ambulatory.    Jocelyn Riggs RN

## 2018-09-19 NOTE — MR AVS SNAPSHOT
After Visit Summary   9/19/2018    Tricia Sosa    MRN: [id number]           Patient Information     Date Of Birth          1940        Visit Information        Provider Department      9/19/2018 9:00 AM RH INFUSION CHAIR 5 [facility]        Today's Diagnoses     CVID (common variable immunodeficiency) (H)    -  1       Follow-ups after your visit        Your next 10 appointments already scheduled     Oct 16, 2018 10:00 AM CDT   Level 6 with RH INFUSION CHAIR 8   [facility] ([facility])    [facility]  [address]  [facility] [address]   [phone]            Nov 13, 2018  9:45 AM CST   Return Visit with Jose Summers MD   [facility] ([facility])    [facility]  [address]  [facility] [address]   [phone]            Nov 13, 2018 10:30 AM CST   Level 5 with RH INFUSION CHAIR 2   [facility] ([facility])    [facility]  [address]  [facility] [address] [phone]              Who to contact     If you have questions or need follow up information about today's clinic visit or your schedule please contact [facility] directly at [phone].  Normal or non-critical lab and imaging results will be communicated to you by MyChart, letter or phone within 4 business days after the clinic has received the results. If you do not hear from us within 7 days, please contact the clinic through MyChart or phone. If you have a critical or abnormal lab result, we will notify you by phone as soon as possible.  Submit refill requests through [employer] or call your pharmacy and they will forward the refill request to us. Please allow 3 business days for your refill to be completed.           Additional Information About Your Visit        MyChart Information     [employer] gives you secure access to your electronic health record. If you see a primary care provider, you can also send messages to your care team and make appointments. If you have questions, please call your primary care clinic.  If you do not have a primary care provider, please call [phone] and they will assist you.        Care EveryWhere ID     This is your Care EveryWhere ID. This could be used by other organizations to access your [facility] medical records  [id number]        Your Vitals Were     Pulse Temperature Respirations Pulse Oximetry BMI (Body Mass Index)       68 97  F (36.1  C) (Tympanic) 16 100% 19.72 kg/m2        Blood Pressure from Last 3 Encounters:   09/19/18 123/67   09/04/18 118/68   08/21/18 128/53    Weight from Last 3 Encounters:   09/19/18 67.8 kg (149 lb 7.6 oz)   09/04/18 67.6 kg (149 lb)   08/15/18 68.2 kg (150 lb 4.8 oz)              Today, you had the following     No orders found for display       Primary Care Provider Office Phone # Fax #    Monika SHANIQUE Jason [facility] [phone] [address] [facility] [address]        Equal Access to Services     ZIA HENRIQUEZ : Hadii aad ku hadasho Soomaali, waaxda luqadaha, qaybta kaalmada adeegyada, waxay selamin haycarlos manueln marek leon. So [facility] [phone].    ATENCIÓN: Si habla español, tiene a dial disposición servicios gratuitos de asistencia lingüística. Llemily al [phone].    We comply with applicable federal civil rights laws and [place] laws. We do not discriminate on the basis of race, color, national origin, age, disability, sex, sexual orientation, or gender identity.            Thank you!     Thank you for choosing [facility]  for your care. Our goal is always to provide you with excellent care. Hearing back from our patients is one way we can continue to improve our services.  Please take a few minutes to complete the written survey that you may receive in the mail after your visit with us. Thank you!             Your Updated Medication List - Protect others around you: Learn how to safely use, store and throw away your medicines at www.disposemymeds.org.          This list is accurate as of 9/19/18  3:41 PM.  Always use your most recent med list.                   Brand Name Dispense Instructions for use Diagnosis    cyanocobalamin 1000 MCG tablet    vitamin  B-12      Anemia, Back pain       Fluocinolone Acetonide Scalp 0.01 % Oil oil           folic acid 1 MG tablet    FOLVITE          levothyroxine 150 MCG tablet    SYNTHROID/LEVOTHROID    90 tablet    TAKE 1 TABLET BY MOUTH ONCE DAILY    Hypothyroidism, unspecified type       sulfamethoxazole-trimethoprim 800-160 MG per tablet    BACTRIM DS/SEPTRA DS    45 tablet    Take 1 pill orally on Mon, Wed and Friday    CVID (common variable immunodeficiency) (H), Other autoimmune hemolytic anemias (H), B12 deficiency

## 2018-09-27 ENCOUNTER — OFFICE VISIT (OUTPATIENT)
Dept: FAMILY MEDICINE | Facility: CLINIC | Age: 78
End: 2018-09-27
Payer: COMMERCIAL

## 2018-09-27 VITALS
RESPIRATION RATE: 16 BRPM | TEMPERATURE: 98 F | OXYGEN SATURATION: 98 % | HEART RATE: 75 BPM | BODY MASS INDEX: 20.11 KG/M2 | SYSTOLIC BLOOD PRESSURE: 122 MMHG | WEIGHT: 152.4 LBS | DIASTOLIC BLOOD PRESSURE: 62 MMHG

## 2018-09-27 DIAGNOSIS — K11.1 PAROTID GLAND ENLARGEMENT: Primary | ICD-10-CM

## 2018-09-27 DIAGNOSIS — M54.2 CERVICALGIA: ICD-10-CM

## 2018-09-27 PROCEDURE — 99213 OFFICE O/P EST LOW 20 MIN: CPT | Performed by: NURSE PRACTITIONER

## 2018-09-27 NOTE — PROGRESS NOTES
SUBJECTIVE:   Tricia Sosa is a 77 year old female who presents to clinic today for the following health issues:      swelling      Duration: 1 night    Description (location/character/radiation): left facial swelling     Intensity:  moderate    Accompanying signs and symptoms: mild pain    History (similar episodes/previous evaluation): None    Precipitating or alleviating factors: None    Therapies tried and outcome: None     Symptoms started 1 day ago.  Noticed while eating dinner minimal discomfort L side jaw near ear.  Later felt the area and noticed it was swollen.  This resolved overnight.  Symptoms recurred with breakfast, but no pain.  It has recurred with lunch and with snacks throughout the day.  No fever.  No trouble swallowing, breathing, or GI symptoms.  The swelling resolves after she is done eating.    Problem list and histories reviewed & adjusted, as indicated.  Additional history: as documented    Patient Active Problem List   Diagnosis     Traction detachment of retina     Hypothyroidism     CVID (common variable immunodeficiency) (H)     Hemolytic anemia (H)     B12 deficiency     CARDIOVASCULAR SCREENING; LDL GOAL LESS THAN 160     Other autoimmune hemolytic anemias (H)     Right-sided low back pain with right-sided sciatica, unspecified chronicity     Past Surgical History:   Procedure Laterality Date     C LIGATE FALLOPIAN TUBE       C THYROIDECTOMY       COLONOSCOPY N/A 4/26/2016    Procedure: COLONOSCOPY;  Surgeon: Dontae Del Rio MD;  Location:  GI     HC COLONOSCOPY W BIOPSY  4/26/00     HC COLONOSCOPY W BIOPSY  10/8/03     HC COLONOSCOPY W BIOPSY  6/27/05    REPEAT IN 5 YEARS     HC CYSTOSCOPY,INSERT URETERAL STENT      Ureteral stent insertion     HC FLEX SIGMOIDOSCOPY W BIOPSY  5/30/00     HC LAPAROSCOPY, SURGICAL; CHOLECYSTECTOMY  1992     LIGATION OF HEMORRHOID(S)      Hemorrhoidectomy     UPPER GI ENDOSCOPY,BIOPSY  10/01/01       Social History   Substance Use Topics      Smoking status: Never Smoker     Smokeless tobacco: Never Used     Alcohol use No     Family History   Problem Relation Age of Onset     Cancer Mother       of colon cancer at age 90     Cerebrovascular Disease Father       at age 85     Dementia Brother      Dementia Brother      Dementia Brother      pancreatic cancer     Colon Cancer No family hx of            Reviewed and updated as needed this visit by clinical staff       Reviewed and updated as needed this visit by Provider         ROS:  SEE HPI.    OBJECTIVE:     /62 (BP Location: Right arm, Patient Position: Chair, Cuff Size: Adult Regular)  Pulse 75  Temp 98  F (36.7  C) (Tympanic)  Resp 16  Wt 152 lb 6.4 oz (69.1 kg)  SpO2 98%  BMI 20.11 kg/m2  Body mass index is 20.11 kg/(m^2).  GENERAL: healthy, alert and no distress  EYES: Eyes grossly normal to inspection  HENT: normal cephalic/atraumatic, ear canals and TM's normal, nose and mouth without ulcers or lesions, oropharynx clear, oral mucous membranes moist and L side swelling over the parotid gland.    NECK: no adenopathy, no asymmetry  PSYCH: mentation appears normal, affect normal/bright    Diagnostic Test Results:  none     ASSESSMENT/PLAN:   1. Parotid gland enlargement  Likely a stone.  No signs of infection.  Present only with eating.  Discussed conservative care, monitor for signs of infection.  Lemon drops, massage.  Call Monday if symptoms are still present, will refer to ENT or oral surgery at that time.  Pt agrees with plan and verbalized understanding.    2. Cervicalgia  Fell about 2 months ago and has some residual L side neck pain.  Interested in PT.  - [facility] REFERRAL; Future    SHANIQUE Walsh Ra, CNP  [facility]

## 2018-09-27 NOTE — MR AVS SNAPSHOT
After Visit Summary   9/27/2018    Tricia Sosa    MRN: [id number]           Patient Information     Date Of Birth          1940        Visit Information        Provider Department      9/27/2018 4:00 PM Monika Whipple Ra, APRN [facility]        Today's Diagnoses     Parotid gland enlargement    -  1      Care Instructions      Salivary Gland Stones  Salivary glands make saliva. Saliva is mostly water. It also has minerals and proteins that help break down food and keep the mouth and teeth healthy. There are 3 pairs of salivary glands:    Parotid glands (in front of the ear)    Submandibular glands (below the jaw)    Sublingual glands (below the tongue)  Each gland has a tube (duct channel) that lets saliva flow from the gland to the mouth. A salivary gland stone can form as a result of poor salivary flow. This lets minerals build up and create a stone. When a stone forms, it blocks the flow of saliva. The gland swells and becomes painful. Symptoms may be worse when eating. This is because food stimulates the flow of saliva.  A blocked salivary gland may become infected. This can cause worsened pain, redness over the gland, and fever.  Tests that help diagnose a salivary gland stone include CT-scan, X-ray, ultrasound, or injection of dye into the salivary duct. A stone may be removed or allowed to pass on its own.  Home care    Unless another medicine was prescribed, take over-the-counter medicines, such as acetaminophen or ibuprofen, to help relieve pain.    Moist heat can also help relieve pain. Wet a cloth with warm water and put it over the affected gland for 10-15 minutes several times a day.    Gently massage the gland a few times a day.     Suck on lemon or other tart hard candies to cause flow of saliva.    To help prevent future stones:  ? Drink 6-8 glasses of fluid per day (such as water, tea, and clear soup) to keep well hydrated.  ? If you smoke, ask your  healthcare provider for help to quit. Smoking makes salivary gland stones more likely.  ? Keep good dental hygiene. Brush and floss your teeth daily. See your dentist for regular cleanings.  Follow-up care  Follow up with your healthcare provider or as advised.  When to seek medical advice  Call your healthcare provider if any of the following occur:    Pain or swelling in the gland that gets worse    Can't open mouth or pain when opening mouth    Fever of 100.4 F (38 C) or higher, or as directed by your healthcare provider    Redness over the sore gland    Pus draining into the mouth    Trouble swallowing or breathing  Date Last Reviewed: 10/1/2017    7901-6790 [employer]. [address] [place] [address]. All rights reserved. This information is not intended as a substitute for professional medical care. Always follow your healthcare professional's instructions.                Follow-ups after your visit        Your next 10 appointments already scheduled     Oct 16, 2018 10:00 AM CDT   Level 6 with RH INFUSION CHAIR 8   [facility] ([facility])    [facility]  [address]  [facility] [address]   [phone]            Nov 13, 2018  9:45 AM CST   Return Visit with Jose Summers MD   [facility] ([facility])    [facility]  [address]  [facility] [address]   [phone]            Nov 13, 2018 10:30 AM CST   Level 5 with RH INFUSION CHAIR 2   [facility] ([facility])    [facility]  [address]  [facility] [address]   [phone]              Who to contact     If you have questions or need follow up information about today's clinic visit or your schedule please contact [facility] directly at [phone].  Normal or  non-critical lab and imaging results will be communicated to you by MyChart, letter or phone within 4 business days after the clinic has received the results. If you do not hear from us within 7 days, please contact the clinic through [employer] or phone. If you have a critical or abnormal lab result, we will notify you by phone as soon as possible.  Submit refill requests through [employer] or call your pharmacy and they will forward the refill request to us. Please allow 3 business days for your refill to be completed.          Additional Information About Your Visit        [employer] Information     [employer] gives you secure access to your electronic health record. If you see a primary care provider, you can also send messages to your care team and make appointments. If you have questions, please call your primary care clinic.  If you do not have a primary care provider, please call [phone] and they will assist you.        Care EveryWhere ID     This is your Care EveryWhere ID. This could be used by other organizations to access your [facility] medical records  [id number]        Your Vitals Were     Pulse Temperature Respirations Pulse Oximetry BMI (Body Mass Index)       75 98  F (36.7  C) (Tympanic) 16 98% 20.11 kg/m2        Blood Pressure from Last 3 Encounters:   09/27/18 122/62   09/19/18 123/67   09/04/18 118/68    Weight from Last 3 Encounters:   09/27/18 152 lb 6.4 oz (69.1 kg)   09/19/18 149 lb 7.6 oz (67.8 kg)   09/04/18 149 lb (67.6 kg)              Today, you had the following     No orders found for display       Primary Care Provider Office Phone # Fax #    Monika SHANIQUE Jason [facility] [phone]       [address] [facility] [address]        Equal Access to Services     ELVIN HENRIQUEZ : Joey Serna, andry sotelo, larissa wolff, naya leon. So [facility] [phone].    ATENCIÓN: Si habla español, tiene a dial disposición  servicios gratuitos de asistencia lingüística. Carlos Eduardo pandey [phone].    We comply with applicable federal civil rights laws and [place] laws. We do not discriminate on the basis of race, color, national origin, age, disability, sex, sexual orientation, or gender identity.            Thank you!     Thank you for choosing [facility]  for your care. Our goal is always to provide you with excellent care. Hearing back from our patients is one way we can continue to improve our services. Please take a few minutes to complete the written survey that you may receive in the mail after your visit with us. Thank you!             Your Updated Medication List - Protect others around you: Learn how to safely use, store and throw away your medicines at www.disposemymeds.org.          This list is accurate as of 9/27/18  4:22 PM.  Always use your most recent med list.                   Brand Name Dispense Instructions for use Diagnosis    cyanocobalamin 1000 MCG tablet    vitamin  B-12      Anemia, Back pain       Fluocinolone Acetonide Scalp 0.01 % Oil oil           folic acid 1 MG tablet    FOLVITE          ketoconazole 1 % shampoo           levothyroxine 150 MCG tablet    SYNTHROID/LEVOTHROID    90 tablet    TAKE 1 TABLET BY MOUTH ONCE DAILY    Hypothyroidism, unspecified type       sulfamethoxazole-trimethoprim 800-160 MG per tablet    BACTRIM DS/SEPTRA DS    45 tablet    Take 1 pill orally on Mon, Wed and Friday    CVID (common variable immunodeficiency) (H), Other autoimmune hemolytic anemias (H), B12 deficiency

## 2018-09-27 NOTE — PATIENT INSTRUCTIONS
Salivary Gland Stones  Salivary glands make saliva. Saliva is mostly water. It also has minerals and proteins that help break down food and keep the mouth and teeth healthy. There are 3 pairs of salivary glands:    Parotid glands (in front of the ear)    Submandibular glands (below the jaw)    Sublingual glands (below the tongue)  Each gland has a tube (duct channel) that lets saliva flow from the gland to the mouth. A salivary gland stone can form as a result of poor salivary flow. This lets minerals build up and create a stone. When a stone forms, it blocks the flow of saliva. The gland swells and becomes painful. Symptoms may be worse when eating. This is because food stimulates the flow of saliva.  A blocked salivary gland may become infected. This can cause worsened pain, redness over the gland, and fever.  Tests that help diagnose a salivary gland stone include CT-scan, X-ray, ultrasound, or injection of dye into the salivary duct. A stone may be removed or allowed to pass on its own.  Home care    Unless another medicine was prescribed, take over-the-counter medicines, such as acetaminophen or ibuprofen, to help relieve pain.    Moist heat can also help relieve pain. Wet a cloth with warm water and put it over the affected gland for 10-15 minutes several times a day.    Gently massage the gland a few times a day.     Suck on lemon or other tart hard candies to cause flow of saliva.    To help prevent future stones:  ? Drink 6-8 glasses of fluid per day (such as water, tea, and clear soup) to keep well hydrated.  ? If you smoke, ask your healthcare provider for help to quit. Smoking makes salivary gland stones more likely.  ? Keep good dental hygiene. Brush and floss your teeth daily. See your dentist for regular cleanings.  Follow-up care  Follow up with your healthcare provider or as advised.  When to seek medical advice  Call your healthcare provider if any of the following occur:    Pain or swelling in  the gland that gets worse    Can't open mouth or pain when opening mouth    Fever of 100.4 F (38 C) or higher, or as directed by your healthcare provider    Redness over the sore gland    Pus draining into the mouth    Trouble swallowing or breathing  Date Last Reviewed: 10/1/2017    [id number] [employer]. [address] [place] [address]. All rights reserved. This information is not intended as a substitute for professional medical care. Always follow your healthcare professional's instructions.

## 2018-10-03 ENCOUNTER — THERAPY VISIT (OUTPATIENT)
Dept: PHYSICAL THERAPY | Facility: CLINIC | Age: 78
End: 2018-10-03
Attending: NURSE PRACTITIONER
Payer: MEDICARE

## 2018-10-03 DIAGNOSIS — M54.2 CERVICALGIA: ICD-10-CM

## 2018-10-03 PROCEDURE — G8981 BODY POS CURRENT STATUS: HCPCS | Mod: GP | Performed by: PHYSICAL THERAPIST

## 2018-10-03 PROCEDURE — 97140 MANUAL THERAPY 1/> REGIONS: CPT | Mod: GP | Performed by: PHYSICAL THERAPIST

## 2018-10-03 PROCEDURE — G8982 BODY POS GOAL STATUS: HCPCS | Mod: GP | Performed by: PHYSICAL THERAPIST

## 2018-10-03 PROCEDURE — 97161 PT EVAL LOW COMPLEX 20 MIN: CPT | Mod: GP | Performed by: PHYSICAL THERAPIST

## 2018-10-03 PROCEDURE — 97110 THERAPEUTIC EXERCISES: CPT | Mod: GP | Performed by: PHYSICAL THERAPIST

## 2018-10-03 NOTE — PROGRESS NOTES
[facility] Initial Evaluation -- Cervical    Evaluation Date: October 3, 2018  Tricia Sosa is a 77 year old female with a neck condition.   Referral: primary care  Work mechanical stresses: none  Employment status: retired  Leisure mechanical stresses: gardening  Functional disability score (NDI):  Didn't complete  VAS score (0-10): 1/10  Patient goals/expectations:  To get rid of pain.    HISTORY:    Present symptoms:  L upper neck and superior angle of L scapulae  Pain quality (sharp/shooting/stabbing/aching/burning/cramping):   ache.  Paresthesia (yes/no):  no    Present since (onset date): 8/8/2018     Symptoms (improving/unchanging/worsening):  Initially improving and now plateaing.    Symptoms commenced as a result of: fall - landed backwards on tailbone and then backwards to head (didn't hit her head).  Condition occurred in the following environment:      Symptoms at onset (neck/arm/forearm/headache):  L upper neck to superior angle of L scapulae  Constant symptoms (neck/arm/forearm/headache):  L upper neck to superior angle of L scapulae  Intermittent symptoms (neck/arm/forearm/headache): none    Symptoms are made worse with the following: Always Sitting in recliner and sitting reading/writing/looking down, Always Lying (on back) and time of day - Always PM   Symptoms are made better with the following: nothing    Disturbed sleep (yes/no):  no  Number of pillows: 1  Sleeping postures (prone/sup/side R/L):  supine    Previous episodes (0/1-5/6-10/11+): 1 Year of first episode: not sure    Previous history: couldn't remember  Previous treatments: couldn't remember    Specific Questions: (as reported by the patient)  Dizziness/Tinnitus/Nausea/Swallowing (pos/neg): dizziness (vertigo) - positional  Gait/Upper Limbs (normal/abnormal):  Has balance issues that are separate from this  Medications (nil/NSAIDS/anlag/steroids/anticoag/other):  Other - Thyroid and Bactrin (preventative for  pneumocystis pneumonia  Medical allergies:  doxycycline  General health (excellent/good/fair/poor):  good  Pertinent medical history:  Anemia, Concussions/Dizziness and Thyroid problemsperipheral neuropathy  Imaging (None/Xray/MRI/Other):  None - did look at tailbone and that was negative  Recent or major surgery (yes/no): no  Night pain (yes/no): no  Accidents (yes/no): yes (see above - fall)  Unexplained weight loss (yes/no): no  Barriers at home: no  Other red flags: no    EXAMINATION    Posture:   Sitting (good/fair/poor): poor  Standing (good/fair/poor): fair     Protruded head (yes/no): yes   Wry Neck (right/left/nil):  Nil  Relevant (yes/no):  n/a    Correction of posture(better/worse/no effect): no effect  Other observations:  Pt did not like the idea of doing an exercise for her neck lying on her back - c/o sinus congestion and just generally didn't feel very comfortable doing the exercise even though it helped less/abolish her pain (see repeated movement testing below).    Neurological:    Motor Deficit:  Not assessed   Reflexes:  Not assessed  Sensory Deficit:  Not assessed  Dural signs:  Not assessed    Movement Loss:   Luis Mod Min Nil Pain   Protrusion        Flexion   X     Retraction X       Extension   X     Lateral flexion R  X   +   Lateral flexion L   X     Rotation R  X      Rotation L  X        Test Movements:   During: produces, abolishes, increases, decreases, no effect, centralizing, peripheralizing  After: better, worse, no better, no worse, no effect, centralized, peripheralized    Pretest symptoms sittin/10 pain at L superior scap/L neck   Symptoms During Symptoms After ROM increased ROM decreased No Effect                   RET No effect    No Effect         Rep RET No Effect    No Effect      X                   Pretest symptoms lyin/10 at L superior scap/L neck   Symptoms During Symptoms After ROM increased ROM decreased No Effect   RET Abolishes    Better         Rep RET  Abolishes    Better    X                       Static Tests:  None assessed    Provisional Classification:  Derangement - Asymmetrical, unilateral, symptoms above elbow    Principle of Management:  Education:  Postural education, therapeutic dose of exercise, traffic light guide for pain      Mechanical therapy (Y/N):  Y   Extension principle:  Supine or sitting cervical retraction x 10 reps, 4-5 times/day       ASSESSMENT/PLAN:    Patient is a 77 year old female with cervical complaints.    Patient has the following significant findings with corresponding treatment plan.                Diagnosis 1:  Cervical pain secondary to derangement  Pain -  manual therapy, directional preference exercise and home program  Decreased ROM/flexibility - manual therapy, therapeutic exercise and home program  Decreased function - therapeutic activities and home program  Impaired posture - neuro re-education and home program    Therapy Evaluation Codes:   1) History comprised of:   Personal factors that impact the plan of care:      Age, Time since onset of symptoms and resistance to doing supine exercises.    Comorbidity factors that impact the plan of care are:      Dizziness, Migraines/headaches and thyroid isues.     Medications impacting care: thyroid and Bactrim.  2) Examination of Body Systems comprised of:   Body structures and functions that impact the plan of care:      Cervical spine.   Activity limitations that impact the plan of care are:      Reading/Computer work, Sitting and Sleeping.  3) Clinical presentation characteristics are:   Stable/Uncomplicated.  4) Decision-Making    Low complexity using standardized patient assessment instrument and/or measureable assessment of functional outcome.  Cumulative Therapy Evaluation is: Low complexity.    Previous and current functional limitations:  (See Goal Flow Sheet for this information)    Short term and Long term goals: (See Goal Flow Sheet for this information)      Communication ability:  Patient appears to be able to clearly communicate and understand verbal and written communication and follow directions correctly.  Treatment Explanation - The following has been discussed with the patient:   RX ordered/plan of care  Anticipated outcomes  Possible risks and side effects  This patient would benefit from PT intervention to resume normal activities.   Rehab potential is good.    Frequency:  1 X week, once daily  Duration:  for 4 weeks  Discharge Plan:  Achieve all LTG.  Independent in home treatment program.  Reach maximal therapeutic benefit.    Please refer to the daily flowsheet for treatment today, total treatment time and time spent performing 1:1 timed codes.

## 2018-10-03 NOTE — MR AVS SNAPSHOT
After Visit Summary   10/3/2018    Tricia Sosa    MRN: [id number]           Patient Information     Date Of Birth          1940        Visit Information        Provider Department      10/3/2018 2:20 PM Minal Arceo PT [facility]        Today's Diagnoses     Cervicalgia           Follow-ups after your visit        Your next 10 appointments already scheduled     Oct 09, 2018 11:30 AM CDT   [facility] ([facility] [place])    [address] [place] [address]   [phone]            Oct 16, 2018 10:00 AM CDT   Level 6 with RH INFUSION CHAIR 8   [facility] ([facility])    [facility]  [address]  [facility] [address]   [phone]            Oct 17, 2018 11:50 AM CDT   [facility] ([facility] [place])    [address] [address]   [phone]            Nov 13, 2018  9:45 AM CST   Return Visit with Jose Summers MD   [facility] ([facility])    [facility]  [address]  [facility] [address] [phone]            Nov 13, 2018 10:30 AM CST   Level 5 with RH INFUSION CHAIR 2   [facility] ([facility])    [facility]  [address]  [facility] [address]   [phone]              Who to contact     If you have questions or need follow up information about today's clinic visit or your schedule please contact [facility] directly at [phone].  Normal or non-critical lab and imaging results will be communicated to you by MyChart, letter or phone within 4 business days after the clinic has received the results. If  you do not hear from us within 7 days, please contact the clinic through App[employer] or phone. If you have a critical or abnormal lab result, we will notify you by phone as soon as possible.  Submit refill requests through AppMesh or call your pharmacy and they will forward the refill request to us. Please allow 3 business days for your refill to be completed.          Additional Information About Your Visit        123peoplehart Information     App[employer] gives you secure access to your electronic health record. If you see a primary care provider, you can also send messages to your care team and make appointments. If you have questions, please call your primary care clinic.  If you do not have a primary care provider, please call [phone] and they will assist you.        Care EveryWhere ID     This is your Care EveryWhere ID. This could be used by other organizations to access your [facility] medical records  [id number]         Blood Pressure from Last 3 Encounters:   09/27/18 122/62   09/19/18 123/67   09/04/18 118/68    Weight from Last 3 Encounters:   09/27/18 69.1 kg (152 lb 6.4 oz)   09/19/18 67.8 kg (149 lb 7.6 oz)   09/04/18 67.6 kg (149 lb)              We Performed the Following     [facility] PT EVAL, LOW COMPLEXITY     [facility] CERT REPORT     [facility] INITIAL EVAL REPORT     [facility] PT, HAND, AND CHIROPRACTIC REFERRAL     MANUAL THER TECH,1+REGIONS,EA 15 MIN     THERAPEUTIC EXERCISES        Primary Care Provider Office Phone # Fax #    Monika SHANIQUE Jason [facility] [phone]       [address] [facility] [address]        Equal Access to Services     [facility] : Hadii aad ku hadasho Soomaali, waaxda luqadaha, qaybta kaalmada adeegyada, naya santiago haycarol ackerman . So [facility] [phone].    ATENCIÓN: Si habla español, tiene a dial disposición servicios gratuitos de asistencia lingüística. Llame al [phone].    We comply with applicable federal civil rights laws and [place] laws. We do not  discriminate on the basis of race, color, national origin, age, disability, sex, sexual orientation, or gender identity.            Thank you!     Thank you for choosing [facility]  for your care. Our goal is always to provide you with excellent care. Hearing back from our patients is one way we can continue to improve our services. Please take a few minutes to complete the written survey that you may receive in the mail after your visit with us. Thank you!             Your Updated Medication List - Protect others around you: Learn how to safely use, store and throw away your medicines at www.disposemymeds.org.          This list is accurate as of 10/3/18  4:12 PM.  Always use your most recent med list.                   Brand Name Dispense Instructions for use Diagnosis    cyanocobalamin 1000 MCG tablet    vitamin  B-12      Anemia, Back pain       Fluocinolone Acetonide Scalp 0.01 % Oil oil           folic acid 1 MG tablet    FOLVITE          ketoconazole 1 % shampoo           levothyroxine 150 MCG tablet    SYNTHROID/LEVOTHROID    90 tablet    TAKE 1 TABLET BY MOUTH ONCE DAILY    Hypothyroidism, unspecified type       sulfamethoxazole-trimethoprim 800-160 MG per tablet    BACTRIM DS/SEPTRA DS    45 tablet    Take 1 pill orally on Mon, Wed and Friday    CVID (common variable immunodeficiency) (H), Other autoimmune hemolytic anemias (H), B12 deficiency

## 2018-10-03 NOTE — LETTER
DEPARTMENT OF HEALTH AND HUMAN SERVICES  CENTERS FOR MEDICARE & MEDICAID SERVICES    PLAN/UPDATED PLAN OF PROGRESS FOR OUTPATIENT REHABILITATION    PATIENTS NAME:  Tricia Sosa   : 1940  PROVIDER NUMBER:    [id number]  HICN:  [id number]  PROVIDER NAME: [facility]  MEDICAL RECORD NUMBER: [id number]   START OF CARE DATE:  SOC Date: 10/03/18   TYPE:  PT  PRIMARY/TREATMENT DIAGNOSIS: (Pertinent Medical Diagnosis)  Cervicalgia  VISITS FROM START OF CARE:  Rxs Used: 1     [facility] Initial Evaluation -- Cervical  Evaluation Date: October 3, 2018  Tricia Sosa is a 77 year old female with a neck condition.   Referral: primary care  Work mechanical stresses: none  Employment status: retired  Leisure mechanical stresses: gardening  Functional disability score (NDI):  Didn't complete  VAS score (0-10): 1/10  Patient goals/expectations:  To get rid of pain.    HISTORY:    Present symptoms:  L upper neck and superior angle of L scapulae  Pain quality (sharp/shooting/stabbing/aching/burning/cramping):   ache.  Paresthesia (yes/no):  no  Present since (onset date): 2018     Symptoms (improving/unchanging/worsening):  Initially improving and now plateaing.  Symptoms commenced as a result of: fall - landed backwards on tailbone and then backwards to head (didn't hit her head).  Condition occurred in the following environment:    Symptoms at onset (neck/arm/forearm/headache):  L upper neck to superior angle of L scapulae  Constant symptoms (neck/arm/forearm/headache):  L upper neck to superior angle of L scapulae  Intermittent symptoms (neck/arm/forearm/headache): none  Symptoms are made worse with the following: Always Sitting in recliner and sitting reading/writing/looking down, Always Lying (on back) and time of day - Always PM   Symptoms are made better with the following: nothing  Disturbed sleep (yes/no):  no  Number of pillows: 1  Sleeping postures  (prone/sup/side R/L):  supine  Previous episodes (0/1-5/6-10/11+): 1 Year of first episode: not sure  Previous history: couldn't remember  Previous treatments: couldn't remember        PATIENTS NAME:  Tricia Sosa   : 1940    Specific Questions: (as reported by the patient)  Dizziness/Tinnitus/Nausea/Swallowing (pos/neg): dizziness (vertigo) - positional  Gait/Upper Limbs (normal/abnormal):  Has balance issues that are separate from this  Medications (nil/NSAIDS/anlag/steroids/anticoag/other):  Other - Thyroid and Bactrin (preventative for pneumocystis pneumonia  Medical allergies:  doxycycline  General health (excellent/good/fair/poor):  good  Pertinent medical history:  Anemia, Concussions/Dizziness and Thyroid problemsperipheral neuropathy  Imaging (None/Xray/MRI/Other):  None - did look at tailbone and that was negative  Recent or major surgery (yes/no): no  Night pain (yes/no): no  Accidents (yes/no): yes (see above - fall)  Unexplained weight loss (yes/no): no  Barriers at home: no  Other red flags: no    EXAMINATION  Posture:   Sitting (good/fair/poor): poor  Standing (good/fair/poor): fair   Protruded head (yes/no): yes   Wry Neck (right/left/nil):  Nil  Relevant (yes/no):  n/a  Correction of posture(better/worse/no effect): no effect  Other observations:  Pt did not like the idea of doing an exercise for her neck lying on her back - c/o sinus congestion and just generally didn't feel very comfortable doing the exercise even though it helped less/abolish her pain (see repeated movement testing below).    Neurological:  Motor Deficit:  Not assessed   Reflexes:  Not assessed  Sensory Deficit:  Not assessed  Dural signs:  Not assessed    Movement Loss:   Luis Mod Min Nil Pain   Protrusion        Flexion   X     Retraction X       Extension   X     Lateral flexion R  X   +   Lateral flexion L   X     Rotation R  X      Rotation L  X        Test Movements:   During: produces, abolishes, increases,  decreases, no effect, centralizing, peripheralizing  After: better, worse, no better, no worse, no effect, centralized, peripheralized        PATIENTS NAME:  Tricia Sosa   : 1940      Pretest symptoms sittin/10 pain at L superior scap/L neck   Symptoms During Symptoms After ROM increased ROM decreased No Effect                   RET No effect    No Effect         Rep RET No Effect    No Effect      X                   Pretest symptoms lyin/10 at L superior scap/L neck   Symptoms During Symptoms After ROM increased ROM decreased No Effect   RET Abolishes    Better         Rep RET Abolishes    Better    X                       Static Tests:  None assessed    Provisional Classification:  Derangement - Asymmetrical, unilateral, symptoms above elbow    Principle of Management:  Education:  Postural education, therapeutic dose of exercise, traffic light guide for pain   Mechanical therapy (Y/N):  Y   Extension principle:  Supine or sitting cervical retraction x 10 reps, 4-5 times/day     ASSESSMENT/PLAN:  Patient is a 77 year old female with cervical complaints.    Patient has the following significant findings with corresponding treatment plan.                Diagnosis 1:  Cervical pain secondary to derangement  Pain -  manual therapy, directional preference exercise and home program  Decreased ROM/flexibility - manual therapy, therapeutic exercise and home program  Decreased function - therapeutic activities and home program  Impaired posture - neuro re-education and home program    Therapy Evaluation Codes:   1) History comprised of:   Personal factors that impact the plan of care:      Age, Time since onset of symptoms and resistance to doing supine exercises.    Comorbidity factors that impact the plan of care are:      Dizziness, Migraines/headaches and thyroid isues.     Medications impacting care: thyroid and Bactrim.    PATIENTS NAME:  Tricia Sosa   : 1940    2) Examination of Body  "Systems comprised of:   Body structures and functions that impact the plan of care:      Cervical spine.   Activity limitations that impact the plan of care are:      Reading/Computer work, Sitting and Sleeping.  3) Clinical presentation characteristics are:   Stable/Uncomplicated.  4) Decision-Making    Low complexity using standardized patient assessment instrument and/or measureable assessment of functional outcome.  Cumulative Therapy Evaluation is: Low complexity.  Previous and current functional limitations:  (See Goal Flow Sheet for this information)    Short term and Long term goals: (See Goal Flow Sheet for this information)   Communication ability:  Patient appears to be able to clearly communicate and understand verbal and written communication and follow directions correctly.  Treatment Explanation - The following has been discussed with the patient:   RX ordered/plan of care  Anticipated outcomes  Possible risks and side effects  This patient would benefit from PT intervention to resume normal activities.   Rehab potential is good.  Frequency:  1 X week, once daily  Duration:  for 4 weeks  Discharge Plan:  Achieve all LTG.  Independent in home treatment program.  Reach maximal therapeutic benefit.    Please refer to the daily flowsheet for treatment today, total treatment time and time spent performing 1:1 timed codes.        Caregiver Signature/Credentials _____________________________ Date ________       Treating Provider: Minal Arceo DPT, Cert. MDT   I have reviewed and certified the need for these services and plan of treatment while under my care.        PHYSICIAN'S SIGNATURE:   _________________________________________  Date___________   Monika Whipple    Certification period:  Beginning of Cert date period: 10/03/18 to  End of Cert period date: 12/31/18     Functional Level Progress Report: Please see attached \"Goal Flow sheet for Functional level.\"    ____X____ Continue Services or       " ________ DC Services                Service dates: From  SOC Date: 10/03/18 date to present

## 2018-10-09 ENCOUNTER — TELEPHONE (OUTPATIENT)
Dept: FAMILY MEDICINE | Facility: CLINIC | Age: 78
End: 2018-10-09

## 2018-10-09 DIAGNOSIS — E03.9 HYPOTHYROIDISM, UNSPECIFIED TYPE: ICD-10-CM

## 2018-10-09 NOTE — TELEPHONE ENCOUNTER
"Requested Prescriptions   Pending Prescriptions Disp Refills     levothyroxine (SYNTHROID/LEVOTHROID) 150 MCG tablet [Pharmacy Med Name: Levothyroxine Sodium Oral Tablet 150 MCG]  Last Written Prescription Date:  4/10/18  Last Fill Quantity: 90,  # refills: 1   Last office visit: 9/27/2018 with prescribing provider:  Monika Whipple Ra, APRN CNP   Future Office Visit:   Next 5 appointments (look out 90 days)     Nov 13, 2018  9:45 AM CST   Return Visit with Jose Summers MD   [facility] ([facility])    [facility]  [address]  [facility] [address]   [phone]                  90 tablet 0     Sig: TAKE 1 TABLET BY MOUTH ONCE DAILY    Thyroid Protocol Passed    10/9/2018  8:42 AM       Passed - Patient is 12 years or older       Passed - Recent (12 mo) or future (30 days) visit within the authorizing provider's specialty    Patient had office visit in the last 12 months or has a visit in the next 30 days with authorizing provider or within the authorizing provider's specialty.  See \"Patient Info\" tab in inbasket, or \"Choose Columns\" in Meds & Orders section of the refill encounter.           Passed - Normal TSH on file in past 12 months    Recent Labs   Lab Test  10/20/17   1039   TSH  1.90             Passed - No active pregnancy on record    If patient is pregnant or has had a positive pregnancy test, please check TSH.         Passed - No positive pregnancy test in past 12 months    If patient is pregnant or has had a positive pregnancy test, please check TSH.            "

## 2018-10-11 RX ORDER — LEVOTHYROXINE SODIUM 150 UG/1
TABLET ORAL
Qty: 30 TABLET | Refills: 0 | Status: SHIPPED | OUTPATIENT
Start: 2018-10-11 | End: 2018-11-15

## 2018-10-11 NOTE — TELEPHONE ENCOUNTER
Medication is being filled for 1 time refill only due to:  Pt due for an office visit and lab work.     Celina Sanders RN

## 2018-10-15 NOTE — TELEPHONE ENCOUNTER
Patient made lab appointment for this Wednesday 10/17 for TSH, please place orders.    Thanks  Hayes KELLEY  Team Coodinator

## 2018-10-16 ENCOUNTER — HOSPITAL ENCOUNTER (OUTPATIENT)
Facility: CLINIC | Age: 78
Setting detail: SPECIMEN
Discharge: HOME | End: 2018-10-16
Attending: INTERNAL MEDICINE | Admitting: INTERNAL MEDICINE
Payer: MEDICARE

## 2018-10-16 ENCOUNTER — INFUSION THERAPY VISIT (OUTPATIENT)
Dept: INFUSION THERAPY | Facility: CLINIC | Age: 78
End: 2018-10-16
Attending: INTERNAL MEDICINE
Payer: MEDICARE

## 2018-10-16 VITALS
HEART RATE: 77 BPM | SYSTOLIC BLOOD PRESSURE: 130 MMHG | RESPIRATION RATE: 16 BRPM | OXYGEN SATURATION: 99 % | TEMPERATURE: 97.3 F | BODY MASS INDEX: 20.42 KG/M2 | DIASTOLIC BLOOD PRESSURE: 71 MMHG | WEIGHT: 154.8 LBS

## 2018-10-16 DIAGNOSIS — D59.19 OTHER AUTOIMMUNE HEMOLYTIC ANEMIAS: ICD-10-CM

## 2018-10-16 DIAGNOSIS — E53.8 B12 DEFICIENCY: ICD-10-CM

## 2018-10-16 DIAGNOSIS — E03.9 HYPOTHYROIDISM, UNSPECIFIED TYPE: ICD-10-CM

## 2018-10-16 DIAGNOSIS — D83.9 CVID (COMMON VARIABLE IMMUNODEFICIENCY) (H): Primary | ICD-10-CM

## 2018-10-16 LAB
ALBUMIN SERPL-MCNC: 3.8 G/DL (ref 3.4–5)
ALP SERPL-CCNC: 130 U/L (ref 40–150)
ALT SERPL W P-5'-P-CCNC: 25 U/L (ref 0–50)
ANION GAP SERPL CALCULATED.3IONS-SCNC: 8 MMOL/L (ref 3–14)
AST SERPL W P-5'-P-CCNC: 41 U/L (ref 0–45)
BASOPHILS # BLD AUTO: 0 10E9/L (ref 0–0.2)
BASOPHILS NFR BLD AUTO: 0.4 %
BILIRUB SERPL-MCNC: 0.7 MG/DL (ref 0.2–1.3)
BUN SERPL-MCNC: 13 MG/DL (ref 7–30)
CALCIUM SERPL-MCNC: 7.9 MG/DL (ref 8.5–10.1)
CHLORIDE SERPL-SCNC: 106 MMOL/L (ref 94–109)
CO2 SERPL-SCNC: 25 MMOL/L (ref 20–32)
CREAT SERPL-MCNC: 0.63 MG/DL (ref 0.52–1.04)
DIFFERENTIAL METHOD BLD: ABNORMAL
EOSINOPHIL # BLD AUTO: 0.1 10E9/L (ref 0–0.7)
EOSINOPHIL NFR BLD AUTO: 0.9 %
ERYTHROCYTE [DISTWIDTH] IN BLOOD BY AUTOMATED COUNT: 14.1 % (ref 10–15)
GFR SERPL CREATININE-BSD FRML MDRD: >90 ML/MIN/1.7M2
GLUCOSE SERPL-MCNC: 123 MG/DL (ref 70–99)
HCT VFR BLD AUTO: 31.5 % (ref 35–47)
HGB BLD-MCNC: 10.8 G/DL (ref 11.7–15.7)
IMM GRANULOCYTES # BLD: 0 10E9/L (ref 0–0.4)
IMM GRANULOCYTES NFR BLD: 0.6 %
LDH SERPL L TO P-CCNC: 315 U/L (ref 81–234)
LYMPHOCYTES # BLD AUTO: 1 10E9/L (ref 0.8–5.3)
LYMPHOCYTES NFR BLD AUTO: 18.7 %
MCH RBC QN AUTO: 31 PG (ref 26.5–33)
MCHC RBC AUTO-ENTMCNC: 34.3 G/DL (ref 31.5–36.5)
MCV RBC AUTO: 91 FL (ref 78–100)
MONOCYTES # BLD AUTO: 0.6 10E9/L (ref 0–1.3)
MONOCYTES NFR BLD AUTO: 10.9 %
NEUTROPHILS # BLD AUTO: 3.7 10E9/L (ref 1.6–8.3)
NEUTROPHILS NFR BLD AUTO: 68.5 %
NRBC # BLD AUTO: 0 10*3/UL
NRBC BLD AUTO-RTO: 0 /100
PLATELET # BLD AUTO: 148 10E9/L (ref 150–450)
POTASSIUM SERPL-SCNC: 3.8 MMOL/L (ref 3.4–5.3)
PROT SERPL-MCNC: 6.5 G/DL (ref 6.8–8.8)
RBC # BLD AUTO: 3.48 10E12/L (ref 3.8–5.2)
RETICS # AUTO: 65.1 10E9/L (ref 25–95)
RETICS/RBC NFR AUTO: 1.9 % (ref 0.5–2)
SODIUM SERPL-SCNC: 139 MMOL/L (ref 133–144)
TSH SERPL DL<=0.005 MIU/L-ACNC: 2.81 MU/L (ref 0.4–4)
VIT B12 SERPL-MCNC: 847 PG/ML (ref 193–986)
WBC # BLD AUTO: 5.4 10E9/L (ref 4–11)

## 2018-10-16 PROCEDURE — 80053 COMPREHEN METABOLIC PANEL: CPT | Performed by: INTERNAL MEDICINE

## 2018-10-16 PROCEDURE — 96365 THER/PROPH/DIAG IV INF INIT: CPT

## 2018-10-16 PROCEDURE — 83010 ASSAY OF HAPTOGLOBIN QUANT: CPT | Performed by: INTERNAL MEDICINE

## 2018-10-16 PROCEDURE — 82784 ASSAY IGA/IGD/IGG/IGM EACH: CPT | Performed by: INTERNAL MEDICINE

## 2018-10-16 PROCEDURE — 85025 COMPLETE CBC W/AUTO DIFF WBC: CPT | Performed by: INTERNAL MEDICINE

## 2018-10-16 PROCEDURE — 25000128 H RX IP 250 OP 636: Performed by: INTERNAL MEDICINE

## 2018-10-16 PROCEDURE — 84443 ASSAY THYROID STIM HORMONE: CPT | Performed by: INTERNAL MEDICINE

## 2018-10-16 PROCEDURE — 83615 LACTATE (LD) (LDH) ENZYME: CPT | Performed by: INTERNAL MEDICINE

## 2018-10-16 PROCEDURE — 82607 VITAMIN B-12: CPT | Performed by: INTERNAL MEDICINE

## 2018-10-16 PROCEDURE — 96375 TX/PRO/DX INJ NEW DRUG ADDON: CPT

## 2018-10-16 PROCEDURE — 96366 THER/PROPH/DIAG IV INF ADDON: CPT

## 2018-10-16 PROCEDURE — 85045 AUTOMATED RETICULOCYTE COUNT: CPT | Performed by: INTERNAL MEDICINE

## 2018-10-16 RX ORDER — ACETAMINOPHEN 325 MG/1
650 TABLET ORAL
Status: CANCELLED
Start: 2018-10-16

## 2018-10-16 RX ORDER — DIPHENHYDRAMINE HCL 25 MG
25 CAPSULE ORAL
Status: CANCELLED | OUTPATIENT
Start: 2018-10-16

## 2018-10-16 RX ADMIN — HUMAN IMMUNOGLOBULIN G 35 G: 20 LIQUID INTRAVENOUS at 10:53

## 2018-10-16 RX ADMIN — HYDROCORTISONE SODIUM SUCCINATE 100 MG: 100 INJECTION, POWDER, FOR SOLUTION INTRAMUSCULAR; INTRAVENOUS at 10:39

## 2018-10-16 ASSESSMENT — PAIN SCALES - GENERAL: PAINLEVEL: NO PAIN (0)

## 2018-10-16 NOTE — TELEPHONE ENCOUNTER
Please place lab orders for patient to have thyroid labs drawn. Patient is at infusion center today and is able to have them collected today but needs orders.  -Pari Guerra

## 2018-10-16 NOTE — PROGRESS NOTES
Infusion Nursing Note:  Maandrew Sosa presents today for IVIG.    Patient seen by provider today: No   present during visit today: Not Applicable.    Note: Labs drawn per Dr Summers's orders. Also kelsie TSH for PCP NP, and informed [facility] of this. Patient states she will call PCP office to ask about synthroid dose.    Intravenous Access:  Peripheral IV placed.    Treatment Conditions:  Biological Infusion Checklist:    ~~~ NOTE: If the patient answers yes to any of the questions below, hold the infusion and contact ordering provider or on-call provider.    1. Have you recently had an elevated temperature, fever, chills, productive cough, coughing for 3 weeks or longer or hemoptysis,  abnormal vital signs, night sweats,  chest pain or have you noticed a decrease in your appetite, unexplained weight loss or fatigue? No  2. Do you have any open wounds or new incisions? No  3. Do you have any recent or upcoming hospitalizations, surgeries or dental procedures? No  4. Do you currently have or recently have had any signs of illness or infection or are you on any antibiotics? Yes, long-term bactrim use  5. Have you had any new, sudden or worsening abdominal pain? No  6. Have you or anyone in your household received a live vaccination in the past 4 weeks? Please note:  No live vaccines while on biologic/chemotherapy until 6 months after the last treatment.  Patient can receive the flu vaccine (shot only) and the pneumovax.  It is optimal for the patient to get these vaccines mid cycle, but they can be given at any time as long as it is not on the day of the infusion. No  7. Have you recently been diagnosed with any new nervous system diseases (ie. Multiple sclerosis, Guillain Acton, seizures, neurological changes) or cancer diagnosis? Are you on any form of radiation or chemotherapy? No  8. Are you pregnant or breast feeding or do you have plans of pregnancy in the future? No  9. Have you been having any  signs of worsening depression or suicidal ideations?  (benlysta only) No  10. Have there been any other new onset medical symptoms? No    Post Infusion Assessment:  Patient tolerated infusion without incident.  Blood return noted pre and post infusion.  Site patent and intact, free from redness, edema or discomfort.  No evidence of extravasations.  Access discontinued per protocol.  Biologic Infusion Post Education: Call the triage nurse at your clinic or seek medical attention if you have chills and/or temperature greater than or equal to 100.5, uncontrolled nausea/vomiting, diarrhea, constipation, dizziness, shortness of breath, chest pain, heart palpitations, weakness or any other new or concerning symptoms, questions or concerns.  You cannot have any live virus vaccines prior to or during treatment or up to 6 months post infusion.  If you have an upcoming surgery, medical procedure or dental procedure during treatment, this should be discussed with your ordering physician and your surgeon/dentist.  If you are having any concerning symptom, if you are unsure if you should get your next infusion or wish to speak to a provider before your next infusion, please call your care coordinator or triage nurse at your clinic to notify them so we can adequately serve you.    Discharge Plan:   Discharge instructions reviewed with: Patient.  Patient and/or family verbalized understanding of discharge instructions and all questions answered.  AVS to patient via [employer].  Patient will return 11/13/18 for IVIG for next appointment.   Patient discharged in stable condition accompanied by: self.  Departure Mode: Ambulatory.    Fadia Brown RN

## 2018-10-16 NOTE — MR AVS SNAPSHOT
After Visit Summary   10/16/2018    Tricia Sosa    MRN: [id number]           Patient Information     Date Of Birth          1940        Visit Information        Provider Department      10/16/2018 10:00 AM RH INFUSION CHAIR 8 [facility]        Today's Diagnoses     CVID (common variable immunodeficiency) (H)    -  1    Other autoimmune hemolytic anemias (H)        B12 deficiency        Hypothyroidism, unspecified type          Care Instructions    EDUCATION POST BIOLOGICAL INFUSION  Call the triage nurse at your clinic or seek medical attention if you have chills and/or temperature greater than or equal to 100.5, uncontrolled nausea/vomiting, diarrhea, constipation, dizziness, shortness of breath, chest pain, heart palpitations, weakness or any other new or concerning symptoms, questions or concerns.  You can not have any live virus vaccines prior to or during treatment or up to 6 months post infusion.  If you have an upcoming surgery, medical procedure or dental procedure during treatment, this should be discussed with your ordering physician and your surgeon/dentist.  If you are having any concerning symptom, if you are unsure if you should get your next infusion or wish to speak to a provider before your next infusion, please call your care coordinator or triage nurse at your clinic to notify them so we can adequately serve you.          Follow-ups after your visit        Your next 10 appointments already scheduled     Oct 17, 2018 11:50 AM CDT   BRENDA Spine with Minal Arceo PT   [facility] (BRENDA Nadine)    [address] [place] [place] [address]   [phone]            Nov 13, 2018  9:45 AM CST   Return Visit with Jose Summers MD   [facility] ([facility])    [facility]  [address]  [place] [address]   [phone]             Nov 13, 2018 10:30 AM CST   Level 5 with RH INFUSION CHAIR 2   [facility] ([facility])    [facility]  [address] [place]  [facility] [address] [phone]              Who to contact     If you have questions or need follow up information about today's clinic visit or your schedule please contact [facility] directly at [phone].  Normal or non-critical lab and imaging results will be communicated to you by VenueBookhart, letter or phone within 4 business days after the clinic has received the results. If you do not hear from us within 7 days, please contact the clinic through "[employer]t or phone. If you have a critical or abnormal lab result, we will notify you by phone as soon as possible.  Submit refill requests through [employer] or call your pharmacy and they will forward the refill request to us. Please allow 3 business days for your refill to be completed.          Additional Information About Your Visit        [employer] Information     [employer] gives you secure access to your electronic health record. If you see a primary care provider, you can also send messages to your care team and make appointments. If you have questions, please call your primary care clinic.  If you do not have a primary care provider, please call [phone] and they will assist you.        Care EveryWhere ID     This is your Care EveryWhere ID. This could be used by other organizations to access your [facility] medical records  [id number]        Your Vitals Were     Pulse Temperature Respirations Pulse Oximetry BMI (Body Mass Index)       77 97.3  F (36.3  C) (Tympanic) 16 99% 20.42 kg/m2        Blood Pressure from Last 3 Encounters:   10/16/18 130/71   09/27/18 122/62   09/19/18 123/67    Weight from Last 3 Encounters:   10/16/18 70.2 kg (154 lb 12.8 oz)   09/27/18 69.1 kg (152 lb 6.4 oz)   09/19/18 67.8 kg (149 lb 7.6 oz)               We Performed the Following     CBC with platelets differential     Comprehensive metabolic panel     Haptoglobin     IgA     IgG     IgM     Lactate Dehydrogenase     RETICULOCYTE COUNT     TSH     Vitamin B12        Primary Care Provider Office Phone # Fax #    SHANIQUE Walsh Ra [facility] [phone] [address] [facility] [address]        Equal Access to Services     ZIA HENRIQUEZ : Hadii aad ku hadasho Soomaali, waaxda luqadaha, qaybta kaalmada adeegyada, waxay idiin hayaan adeeg khjennsh la'aan . So [facility] [phone].    ATENCIÓN: Si habla español, tiene a dial disposición servicios gratuitos de asistencia lingüística. [facility] [phone].    We comply with applicable federal civil rights laws and [place] laws. We do not discriminate on the basis of race, color, national origin, age, disability, sex, sexual orientation, or gender identity.            Thank you!     Thank you for choosing [facility] INF[facility]ION [facility]  for your care. Our goal is always to provide you with excellent care. Hearing back from our patients is one way we can continue to improve our services. Please take a few minutes to complete the written survey that you may receive in the mail after your visit with us. Thank you!             Your Updated Medication List - Protect others around you: Learn how to safely use, store and throw away your medicines at www.disposemymeds.org.          This list is accurate as of 10/16/18  2:37 PM.  Always use your most recent med list.                   Brand Name Dispense Instructions for use Diagnosis    cyanocobalamin 1000 MCG tablet    vitamin  B-12      Anemia, Back pain       Fluocinolone Acetonide Scalp 0.01 % Oil oil           folic acid 1 MG tablet    FOLVITE          ketoconazole 1 % shampoo           levothyroxine 150 MCG tablet    SYNTHROID/LEVOTHROID    30 tablet    TAKE 1 TABLET BY MOUTH ONCE DAILY    Hypothyroidism, unspecified type        sulfamethoxazole-trimethoprim 800-160 MG per tablet    BACTRIM DS/SEPTRA DS    45 tablet    Take 1 pill orally on Mon, Wed and Friday    CVID (common variable immunodeficiency) (H), Other autoimmune hemolytic anemias (H), B12 deficiency

## 2018-10-17 ENCOUNTER — THERAPY VISIT (OUTPATIENT)
Dept: PHYSICAL THERAPY | Facility: CLINIC | Age: 78
End: 2018-10-17
Payer: MEDICARE

## 2018-10-17 DIAGNOSIS — M54.2 CERVICALGIA: ICD-10-CM

## 2018-10-17 LAB
HAPTOGLOB SERPL-MCNC: <6 MG/DL (ref 35–175)
IGA SERPL-MCNC: <7 MG/DL (ref 70–380)
IGG SERPL-MCNC: 889 MG/DL (ref 695–1620)
IGM SERPL-MCNC: 131 MG/DL (ref 60–265)

## 2018-10-17 PROCEDURE — 97110 THERAPEUTIC EXERCISES: CPT | Mod: GP | Performed by: PHYSICAL THERAPIST

## 2018-10-17 PROCEDURE — G8982 BODY POS GOAL STATUS: HCPCS | Mod: GP | Performed by: PHYSICAL THERAPIST

## 2018-10-17 PROCEDURE — G8983 BODY POS D/C STATUS: HCPCS | Mod: GP | Performed by: PHYSICAL THERAPIST

## 2018-10-17 PROCEDURE — 97140 MANUAL THERAPY 1/> REGIONS: CPT | Mod: GP | Performed by: PHYSICAL THERAPIST

## 2018-10-17 NOTE — MR AVS SNAPSHOT
After Visit Summary   10/17/2018    Tricia Sosa    MRN: [id number]           Patient Information     Date Of Birth          1940        Visit Information        Provider Department      10/17/2018 11:50 AM Band, Minal, PT [facility]        Today's Diagnoses     Cervicalgia           Follow-ups after your visit        Your next 10 appointments already scheduled     Nov 13, 2018  9:45 AM CST   Return Visit with Jose Summers MD   [facility] ([facility])    [facility]  [address]  [facility] [address]   [phone]            Nov 13, 2018 10:30 AM CST   Level 5 with RH INFUSION CHAIR 2   [facility] ([facility])    [facility]  [address]  [facility] [address]   [phone]              Who to contact     If you have questions or need follow up information about today's clinic visit or your schedule please contact [facility] directly at [phone].  Normal or non-critical lab and imaging results will be communicated to you by ITDatabasehart, letter or phone within 4 business days after the clinic has received the results. If you do not hear from us within 7 days, please contact the clinic through ITDatabasehart or phone. If you have a critical or abnormal lab result, we will notify you by phone as soon as possible.  Submit refill requests through teextee or call your pharmacy and they will forward the refill request to us. Please allow 3 business days for your refill to be completed.          Additional Information About Your Visit        MyChart Information     te[employer]tee gives you secure access to your electronic health record. If you see a primary care provider, you can also send messages to your care team and make appointments. If you have questions, please call  your primary care clinic.  If you do not have a primary care provider, please call [phone] and they will assist you.        Care EveryWhere ID     This is your Care EveryWhere ID. This could be used by other organizations to access your [facility] medical records  [id number]         Blood Pressure from Last 3 Encounters:   10/16/18 130/71   09/27/18 122/62   09/19/18 123/67    Weight from Last 3 Encounters:   10/16/18 70.2 kg (154 lb 12.8 oz)   09/27/18 69.1 kg (152 lb 6.4 oz)   09/19/18 67.8 kg (149 lb 7.6 oz)              We Performed the Following     BRENDA PROGRESS NOTES REPORT     MANUAL THER TECH,1+REGIONS,EA 15 MIN     THERAPEUTIC EXERCISES        Primary Care Provider Office Phone # Fax #    SHANIQUE Walsh Ra [facility] [phone]       [address] [address] [facility] [address]        Equal Access to Services     ELVIN HENRIQUEZ : Hadii aad ku hadasho Soomaali, waaxda luqadaha, qaybta kaalmada adeegyada, waxay idiin hayaan adeeg kharabillie cao'carol . So [facility] [phone].    ATENCIÓN: Si kikola maritza, tiene a dial disposición servicios gratuitos de asistencia lingüística. Llame al [phone].    We comply with applicable federal civil rights laws and [place] laws. We do not discriminate on the basis of race, color, national origin, age, disability, sex, sexual orientation, or gender identity.            Thank you!     Thank you for choosing [facility]  for your care. Our goal is always to provide you with excellent care. Hearing back from our patients is one way we can continue to improve our services. Please take a few minutes to complete the written survey that you may receive in the mail after your visit with us. Thank you!             Your Updated Medication List - Protect others around you: Learn how to safely use, store and throw away your medicines at www.disposemymeds.org.          This list is accurate as of 10/17/18 12:24 PM.  Always use your most recent  med list.                   Brand Name Dispense Instructions for use Diagnosis    cyanocobalamin 1000 MCG tablet    vitamin  B-12      Anemia, Back pain       Fluocinolone Acetonide Scalp 0.01 % Oil oil           folic acid 1 MG tablet    FOLVITE          ketoconazole 1 % shampoo           levothyroxine 150 MCG tablet    SYNTHROID/LEVOTHROID    30 tablet    TAKE 1 TABLET BY MOUTH ONCE DAILY    Hypothyroidism, unspecified type       sulfamethoxazole-trimethoprim 800-160 MG per tablet    BACTRIM DS/SEPTRA DS    45 tablet    Take 1 pill orally on Mon, Wed and Friday    CVID (common variable immunodeficiency) (H), Other autoimmune hemolytic anemias (H), B12 deficiency

## 2018-10-17 NOTE — PROGRESS NOTES
Subjective:  HPI                    Objective:  System    Physical Exam    General     ROS    Assessment/Plan:    DISCHARGE REPORT    Progress reporting period is from 10/3/2018 to 10/17/2018.       SUBJECTIVE  Subjective changes noted by patient:  Pt reports that she no longer feels any pain with lying in bed and or sitting in her recliner.   She has been doing her exercises.  Current pain level is 0/10  .     Previous pain level was  2/10  .   Changes in function:  Yes (See Goal flowsheet attached for changes in current functional level)  Adverse reaction to treatment or activity: None    OBJECTIVE  Changes noted in objective findings:  Yes, see below.  Objective: cervical AROM flex min loss, ext min loss, B rotation mod loss, B SB mod loss - no pain any direction.    ASSESSMENT/PLAN  STG/LTGs have been met or progress has been made towards goals:  Yes (See Goal flow sheet completed today.)  Assessment of Progress: The patient has met all of their long term goals.  Self Management Plans:  Patient is independent in a home treatment program.  I have re-evaluated this patient and find that the nature, scope, duration and intensity of the therapy is appropriate for the medical condition of the patient.  Maandrew continues to require the following intervention to meet STG and LTG's:  PT intervention is no longer required to meet STG/LTG.    Recommendations:  This patient is ready to be discharged from therapy and continue their home treatment program.    Please refer to the daily flowsheet for treatment today, total treatment time and time spent performing 1:1 timed codes.

## 2018-11-06 DIAGNOSIS — D59.19 OTHER AUTOIMMUNE HEMOLYTIC ANEMIAS: ICD-10-CM

## 2018-11-06 DIAGNOSIS — D83.9 CVID (COMMON VARIABLE IMMUNODEFICIENCY) (H): ICD-10-CM

## 2018-11-06 DIAGNOSIS — E53.8 B12 DEFICIENCY: ICD-10-CM

## 2018-11-06 LAB
ALBUMIN SERPL-MCNC: 4 G/DL (ref 3.4–5)
ALP SERPL-CCNC: 117 U/L (ref 40–150)
ALT SERPL W P-5'-P-CCNC: 31 U/L (ref 0–50)
ANION GAP SERPL CALCULATED.3IONS-SCNC: 9 MMOL/L (ref 3–14)
AST SERPL W P-5'-P-CCNC: 44 U/L (ref 0–45)
BASOPHILS # BLD AUTO: 0 10E9/L (ref 0–0.2)
BASOPHILS NFR BLD AUTO: 0.5 %
BILIRUB SERPL-MCNC: 0.8 MG/DL (ref 0.2–1.3)
BUN SERPL-MCNC: 14 MG/DL (ref 7–30)
CALCIUM SERPL-MCNC: 8.8 MG/DL (ref 8.5–10.1)
CHLORIDE SERPL-SCNC: 105 MMOL/L (ref 94–109)
CO2 SERPL-SCNC: 26 MMOL/L (ref 20–32)
CREAT SERPL-MCNC: 0.86 MG/DL (ref 0.52–1.04)
DIFFERENTIAL METHOD BLD: NORMAL
EOSINOPHIL # BLD AUTO: 0 10E9/L (ref 0–0.7)
EOSINOPHIL NFR BLD AUTO: 1 %
ERYTHROCYTE [DISTWIDTH] IN BLOOD BY AUTOMATED COUNT: 13.8 % (ref 10–15)
GFR SERPL CREATININE-BSD FRML MDRD: 64 ML/MIN/1.7M2
GLUCOSE SERPL-MCNC: 95 MG/DL (ref 70–99)
HCT VFR BLD AUTO: 35.4 % (ref 35–47)
HGB BLD-MCNC: 11.9 G/DL (ref 11.7–15.7)
LDH SERPL L TO P-CCNC: 306 U/L (ref 81–234)
LYMPHOCYTES # BLD AUTO: 1 10E9/L (ref 0.8–5.3)
LYMPHOCYTES NFR BLD AUTO: 23.9 %
MCH RBC QN AUTO: 29.9 PG (ref 26.5–33)
MCHC RBC AUTO-ENTMCNC: 33.6 G/DL (ref 31.5–36.5)
MCV RBC AUTO: 89 FL (ref 78–100)
MONOCYTES # BLD AUTO: 0.6 10E9/L (ref 0–1.3)
MONOCYTES NFR BLD AUTO: 14.7 %
NEUTROPHILS # BLD AUTO: 2.5 10E9/L (ref 1.6–8.3)
NEUTROPHILS NFR BLD AUTO: 59.9 %
PLATELET # BLD AUTO: 155 10E9/L (ref 150–450)
POTASSIUM SERPL-SCNC: 4.2 MMOL/L (ref 3.4–5.3)
PROT SERPL-MCNC: 7 G/DL (ref 6.8–8.8)
RBC # BLD AUTO: 3.98 10E12/L (ref 3.8–5.2)
RETICS # AUTO: 75 10E9/L (ref 25–95)
RETICS/RBC NFR AUTO: 1.9 % (ref 0.5–2)
SODIUM SERPL-SCNC: 140 MMOL/L (ref 133–144)
VIT B12 SERPL-MCNC: 834 PG/ML (ref 193–986)
WBC # BLD AUTO: 4.2 10E9/L (ref 4–11)

## 2018-11-06 PROCEDURE — 85045 AUTOMATED RETICULOCYTE COUNT: CPT | Performed by: INTERNAL MEDICINE

## 2018-11-06 PROCEDURE — 82784 ASSAY IGA/IGD/IGG/IGM EACH: CPT | Performed by: INTERNAL MEDICINE

## 2018-11-06 PROCEDURE — 83615 LACTATE (LD) (LDH) ENZYME: CPT | Performed by: INTERNAL MEDICINE

## 2018-11-06 PROCEDURE — 36415 COLL VENOUS BLD VENIPUNCTURE: CPT | Performed by: INTERNAL MEDICINE

## 2018-11-06 PROCEDURE — 85025 COMPLETE CBC W/AUTO DIFF WBC: CPT | Performed by: INTERNAL MEDICINE

## 2018-11-06 PROCEDURE — 80053 COMPREHEN METABOLIC PANEL: CPT | Performed by: INTERNAL MEDICINE

## 2018-11-06 PROCEDURE — 82607 VITAMIN B-12: CPT | Performed by: INTERNAL MEDICINE

## 2018-11-06 PROCEDURE — 83010 ASSAY OF HAPTOGLOBIN QUANT: CPT | Performed by: INTERNAL MEDICINE

## 2018-11-07 LAB
HAPTOGLOB SERPL-MCNC: <6 MG/DL (ref 35–175)
IGA SERPL-MCNC: <7 MG/DL (ref 70–380)
IGG SERPL-MCNC: 974 MG/DL (ref 695–1620)
IGM SERPL-MCNC: 130 MG/DL (ref 60–265)

## 2018-11-13 ENCOUNTER — ONCOLOGY VISIT (OUTPATIENT)
Dept: ONCOLOGY | Facility: CLINIC | Age: 78
End: 2018-11-13
Attending: INTERNAL MEDICINE
Payer: MEDICARE

## 2018-11-13 ENCOUNTER — INFUSION THERAPY VISIT (OUTPATIENT)
Dept: INFUSION THERAPY | Facility: CLINIC | Age: 78
End: 2018-11-13
Attending: INTERNAL MEDICINE
Payer: MEDICARE

## 2018-11-13 VITALS
TEMPERATURE: 97.5 F | BODY MASS INDEX: 21.13 KG/M2 | RESPIRATION RATE: 16 BRPM | OXYGEN SATURATION: 99 % | HEART RATE: 73 BPM | HEIGHT: 72 IN | DIASTOLIC BLOOD PRESSURE: 68 MMHG | SYSTOLIC BLOOD PRESSURE: 132 MMHG | WEIGHT: 156 LBS

## 2018-11-13 DIAGNOSIS — E53.8 B12 DEFICIENCY: ICD-10-CM

## 2018-11-13 DIAGNOSIS — D59.19 OTHER AUTOIMMUNE HEMOLYTIC ANEMIAS: ICD-10-CM

## 2018-11-13 DIAGNOSIS — D83.9 CVID (COMMON VARIABLE IMMUNODEFICIENCY) (H): Primary | ICD-10-CM

## 2018-11-13 DIAGNOSIS — D83.9 CVID (COMMON VARIABLE IMMUNODEFICIENCY) (H): ICD-10-CM

## 2018-11-13 PROCEDURE — 96366 THER/PROPH/DIAG IV INF ADDON: CPT

## 2018-11-13 PROCEDURE — G0463 HOSPITAL OUTPT CLINIC VISIT: HCPCS | Mod: 25

## 2018-11-13 PROCEDURE — 99214 OFFICE O/P EST MOD 30 MIN: CPT | Performed by: INTERNAL MEDICINE

## 2018-11-13 PROCEDURE — 96365 THER/PROPH/DIAG IV INF INIT: CPT

## 2018-11-13 PROCEDURE — 25000128 H RX IP 250 OP 636: Performed by: INTERNAL MEDICINE

## 2018-11-13 PROCEDURE — 96375 TX/PRO/DX INJ NEW DRUG ADDON: CPT

## 2018-11-13 RX ORDER — ACETAMINOPHEN 325 MG/1
650 TABLET ORAL
Status: CANCELLED
Start: 2018-11-13

## 2018-11-13 RX ORDER — SULFAMETHOXAZOLE/TRIMETHOPRIM 800-160 MG
TABLET ORAL
Qty: 45 TABLET | Refills: 3 | Status: SHIPPED | OUTPATIENT
Start: 2018-11-13 | End: 2019-09-24

## 2018-11-13 RX ORDER — DIPHENHYDRAMINE HCL 25 MG
25 CAPSULE ORAL
Status: CANCELLED | OUTPATIENT
Start: 2018-11-13

## 2018-11-13 RX ADMIN — HYDROCORTISONE SODIUM SUCCINATE 100 MG: 100 INJECTION, POWDER, FOR SOLUTION INTRAMUSCULAR; INTRAVENOUS at 10:27

## 2018-11-13 RX ADMIN — HUMAN IMMUNOGLOBULIN G 35 G: 20 LIQUID INTRAVENOUS at 10:29

## 2018-11-13 ASSESSMENT — PAIN SCALES - GENERAL: PAINLEVEL: NO PAIN (0)

## 2018-11-13 NOTE — MR AVS SNAPSHOT
After Visit Summary   11/13/2018    Tricia Sosa    MRN: [id number]           Patient Information     Date Of Birth          1940        Visit Information        Provider Department      11/13/2018 10:30 AM RH INFUSION CHAIR 6 [facility]        Today's Diagnoses     CVID (common variable immunodeficiency) (H)    -  1       Follow-ups after your visit        Your next 10 appointments already scheduled     Dec 11, 2018 11:00 AM CST   Level 5 with RH INFUSION CHAIR 5   [facility] ([facility])    [place] [address]  [facility] [address]   [phone]            Jan 08, 2019 10:00 AM CST   Level 5 with RH INFUSION CHAIR 12   [facility] ([facility])    [place] [address]  [facility] [address]   [phone]            Feb 05, 2019  9:15 AM CST   Return Visit with Jose Summers MD   [facility] ([facility])    [place] [id number]  [facility] [address]   [phone]            Feb 05, 2019 10:00 AM CST   Level 5 with RH INFUSION CHAIR 11   [facility] ([facility])    [place] [id number]  [facility] [address]   [phone]              Who to contact     If you have questions or need follow up information about today's clinic visit or your schedule please contact [facility] INFUSION SERVICES directly at [phone].  Normal or non-critical lab and imaging results will be communicated to you by MyChart, letter or phone within 4 business days after the clinic has received the results. If you do not hear from us within 7 days, please contact the clinic through MyChart or phone. If you have  a critical or abnormal lab result, we will notify you by phone as soon as possible.  Submit refill requests through [employer] or call your pharmacy and they will forward the refill request to us. Please allow 3 business days for your refill to be completed.          Additional Information About Your Visit        [employer] Information     [employer] gives you secure access to your electronic health record. If you see a primary care provider, you can also send messages to your care team and make appointments. If you have questions, please call your primary care clinic.  If you do not have a primary care provider, please call [phone] and they will assist you.        Care EveryWhere ID     This is your Care EveryWhere ID. This could be used by other organizations to access your [place] medical records  [id number]         Blood Pressure from Last 3 Encounters:   11/13/18 132/68   10/16/18 130/71   09/27/18 122/62    Weight from Last 3 Encounters:   11/13/18 70.8 kg (156 lb)   10/16/18 70.2 kg (154 lb 12.8 oz)   09/27/18 69.1 kg (152 lb 6.4 oz)              Today, you had the following     No orders found for display         Where to get your medicines      These medications were sent to [id number] - [place], [place] - [address] [address]     Phone:  [phone]     sulfamethoxazole-trimethoprim 800-160 MG per tablet          Primary Care Provider Office Phone # Fax #    Monika SHANIQUE Jason [facility] [phone] [address]  [facility] [address]        Equal Access to Services     [facility]: Hadii aad ku hadasho Soomaali, waaxda luqadaha, qaybta kaalmada adenaya hassan . So [facility] [phone].    ATENCIÓN: Si habla español, tiene a dial disposición servicios gratuitos de asistencia lingüística. Llame al [phone].    We comply with applicable federal civil rights laws and [place] laws. We do not discriminate  on the basis of race, color, national origin, age, disability, sex, sexual orientation, or gender identity.            Thank you!     Thank you for choosing [facility] INFUSION SERVICES  for your care. Our goal is always to provide you with excellent care. Hearing back from our patients is one way we can continue to improve our services. Please take a few minutes to complete the written survey that you may receive in the mail after your visit with us. Thank you!             Your Updated Medication List - Protect others around you: Learn how to safely use, store and throw away your medicines at www.disposemymeds.org.          This list is accurate as of 11/13/18  2:09 PM.  Always use your most recent med list.                   Brand Name Dispense Instructions for use Diagnosis    cyanocobalamin 1000 MCG tablet    vitamin  B-12      Anemia, Back pain       Fluocinolone Acetonide Scalp 0.01 % Oil oil           folic acid 1 MG tablet    FOLVITE          ketoconazole 1 % shampoo           levothyroxine 150 MCG tablet    SYNTHROID/LEVOTHROID    30 tablet    TAKE 1 TABLET BY MOUTH ONCE DAILY    Hypothyroidism, unspecified type       sulfamethoxazole-trimethoprim 800-160 MG per tablet    BACTRIM DS/SEPTRA DS    45 tablet    Take 1 pill orally on Mon, Wed and Friday    CVID (common variable immunodeficiency) (H), Other autoimmune hemolytic anemias (H), B12 deficiency

## 2018-11-13 NOTE — MR AVS SNAPSHOT
After Visit Summary   11/13/2018    Tricia Sosa    MRN: [id number]           Patient Information     Date Of Birth          1940        Visit Information        Provider Department      11/13/2018 9:45 AM Melina, Jose Escudero MD [facility]        Today's Diagnoses     CVID (common variable immunodeficiency) (H)        Other autoimmune hemolytic anemias (H)        B12 deficiency          Care Instructions    IV Ig  infusions every 4 weeks including today    Follow up in 12 weeks with labs prior to visit to see me    CBC w Diff, CMP and LDH; B12, retic, ferritin, iron panel, Ig subsets, haptoglobin            Follow-ups after your visit        Your next 10 appointments already scheduled     Dec 11, 2018 11:00 AM CST   Level 5 with RH INFUSION CHAIR 5   [facility] ([facility])    [facility]  [address]  [facility] [address]   [phone]            Jan 08, 2019 10:00 AM CST   Level 5 with RH INFUSION CHAIR 12   [facility] ([facility])    [facility]  [address]  [facility] [address]   [phone]            Feb 05, 2019  9:15 AM CST   Return Visit with Jose Summers MD   [facility] ([facility])    [facility]  [address]  [facility] [address]   [phone]            Feb 05, 2019 10:00 AM CST   Level 5 with RH INFUSION CHAIR 11   [facility] ([facility])    [facility]  [address]  [facility] [address]   [phone]              Who to contact     If you have questions or need follow up information about today's clinic visit or your schedule please contact [facility] directly at  "[phone].  Normal or non-critical lab and imaging results will be communicated to you by MyChart, letter or phone within 4 business days after the clinic has received the results. If you do not hear from us within 7 days, please contact the clinic through [employer] or phone. If you have a critical or abnormal lab result, we will notify you by phone as soon as possible.  Submit refill requests through [email or web address] or call your pharmacy and they will forward the refill request to us. Please allow 3 business days for your refill to be completed.          Additional Information About Your Visit        [employer] Information     [email or web address] gives you secure access to your electronic health record. If you see a primary care provider, you can also send messages to your care team and make appointments. If you have questions, please call your primary care clinic.  If you do not have a primary care provider, please call [phone] and they will assist you.        Care EveryWhere ID     This is your Care EveryWhere ID. This could be used by other organizations to access your [facility] medical records  [id number]        Your Vitals Were     Pulse Temperature Respirations Height Pulse Oximetry BMI (Body Mass Index)    73 97.5  F (36.4  C) (Tympanic) 16 1.854 m (6' 1\") 99% 20.58 kg/m2       Blood Pressure from Last 3 Encounters:   11/13/18 132/68   10/16/18 130/71   09/27/18 122/62    Weight from Last 3 Encounters:   11/13/18 70.8 kg (156 lb)   10/16/18 70.2 kg (154 lb 12.8 oz)   09/27/18 69.1 kg (152 lb 6.4 oz)              Today, you had the following     No orders found for display         Where to get your medicines      These medications were sent to [id number], [place] - [address] [address]  [address] [facility] [address]     Phone:  [phone]     sulfamethoxazole-trimethoprim 800-160 MG per tablet          Primary Care Provider Office Phone # Fax #    SHANIQUE Walsh Ra [phone] " [phone]       [address] [address] [facility] [address]        Equal Access to Services     ABBYELVIN MARTINEZ : Hadii carlin rios hadharmanpina Socarlos, walesada luqadaha, qaybta kaalmada keeshamo, naya jack betovipin thaomert pitts tyron leon. So [facility] [phone].    ATENCIÓN: Si habla español, tiene a dial disposición servicios gratuitos de asistencia lingüística. Sindyame al [phone].    We comply with applicable federal civil rights laws and [place] laws. We do not discriminate on the basis of race, color, national origin, age, disability, sex, sexual orientation, or gender identity.            Thank you!     Thank you for choosing [facility]  for your care. Our goal is always to provide you with excellent care. Hearing back from our patients is one way we can continue to improve our services. Please take a few minutes to complete the written survey that you may receive in the mail after your visit with us. Thank you!             Your Updated Medication List - Protect others around you: Learn how to safely use, store and throw away your medicines at www.disposemymeds.org.          This list is accurate as of 11/13/18 11:29 AM.  Always use your most recent med list.                   Brand Name Dispense Instructions for use Diagnosis    cyanocobalamin 1000 MCG tablet    vitamin  B-12      Anemia, Back pain       Fluocinolone Acetonide Scalp 0.01 % Oil oil           folic acid 1 MG tablet    FOLVITE          ketoconazole 1 % shampoo           levothyroxine 150 MCG tablet    SYNTHROID/LEVOTHROID    30 tablet    TAKE 1 TABLET BY MOUTH ONCE DAILY    Hypothyroidism, unspecified type       sulfamethoxazole-trimethoprim 800-160 MG per tablet    BACTRIM DS/SEPTRA DS    45 tablet    Take 1 pill orally on Mon, Wed and Friday    CVID (common variable immunodeficiency) (H), Other autoimmune hemolytic anemias (H), B12 deficiency

## 2018-11-13 NOTE — PROGRESS NOTES
Infusion Nursing Note:  Tricia Sosa presents today for IVIG.    Patient seen by provider today: Yes: Dr. Summers   present during visit today: Not Applicable.    Note: N/A.    Intravenous Access:  Peripheral IV placed.    Treatment Conditions:  Biological Infusion Checklist:    ~~~ NOTE: If the patient answers yes to any of the questions below, hold the infusion and contact ordering provider or on-call provider.    1. Have you recently had an elevated temperature, fever, chills, productive cough, coughing for 3 weeks or longer or hemoptysis,  abnormal vital signs, night sweats,  chest pain or have you noticed a decrease in your appetite, unexplained weight loss or fatigue? No  2. Do you have any open wounds or new incisions? No  3. Do you have any recent or upcoming hospitalizations, surgeries or dental procedures? No  4. Do you currently have or recently have had any signs of illness or infection or are you on any antibiotics? No  5. Have you had any new, sudden or worsening abdominal pain? No  6. Have you or anyone in your household received a live vaccination in the past 4 weeks? Please note:  No live vaccines while on biologic/chemotherapy until 6 months after the last treatment.  Patient can receive the flu vaccine (shot only) and the pneumovax.  It is optimal for the patient to get these vaccines mid cycle, but they can be given at any time as long as it is not on the day of the infusion. No  7. Have you recently been diagnosed with any new nervous system diseases (ie. Multiple sclerosis, Guillain [place], seizures, neurological changes) or cancer diagnosis? Are you on any form of radiation or chemotherapy? No  8. Are you pregnant or breast feeding or do you have plans of pregnancy in the future? No  9. Have you been having any signs of worsening depression or suicidal ideations?  (benlysta only) No  10. Have there been any other new onset medical symptoms? No        Post Infusion Assessment:  Patient  tolerated infusion without incident.  Blood return noted pre and post infusion.  Site patent and intact, free from redness, edema or discomfort.  No evidence of extravasations.  Access discontinued per protocol.  Biologic Infusion Post Education: Call the triage nurse at your clinic or seek medical attention if you have chills and/or temperature greater than or equal to 100.5, uncontrolled nausea/vomiting, diarrhea, constipation, dizziness, shortness of breath, chest pain, heart palpitations, weakness or any other new or concerning symptoms, questions or concerns.  You cannot have any live virus vaccines prior to or during treatment or up to 6 months post infusion.  If you have an upcoming surgery, medical procedure or dental procedure during treatment, this should be discussed with your ordering physician and your surgeon/dentist.  If you are having any concerning symptom, if you are unsure if you should get your next infusion or wish to speak to a provider before your next infusion, please call your care coordinator or triage nurse at your clinic to notify them so we can adequately serve you.    Discharge Plan:   Patient declined prescription refills.  Discharge instructions reviewed with: Patient.  Patient and/or family verbalized understanding of discharge instructions and all questions answered.  Copy of AVS reviewed with patient and/or family.  Patient will return 12/11/18 for next appointment.  Patient discharged in stable condition accompanied by: self.  Departure Mode: Ambulatory.    Jocelyn Riggs RN

## 2018-11-13 NOTE — PROGRESS NOTES
[facility]  FOLLOW-UP VISIT NOTE    PATIENT NAME: Tricia Sosa MRN # [id number]  DATE OF VISIT: Nov 13, 2018 YOB: 1940    REFERRING PROVIDER: No referring provider defined for this encounter.    DIAGNOSIS: Hemolytic anemia-autoimmune; IgA deficiency    TREATMENT SUMMARY:  Tricia has been diagnosed with IgA deficiency since her around 2000.  She was very sick at the time and had severe diarrhea.  She lost about 40 pounds in weight.  The workup revealed that she had markedly diminished CD4 counts of 48 and was diagnosed with CMV colitis.  Since then she has been followed in hematology clinic at [place] until recently.  She was noted to have anemia which was fairly long-standing.  She was initially referred for anemia in 2004 and also had a bone marrow biopsy done at that time which revealed hypercellular bone marrow with 70-80% cellularity and normal trilineage hematopoiesis and no morphologic features of MDS or lymphoproliferation.  Her anemia was attributed to her chronic underlying disease.  At the next evaluation in 2002 on 4 aggressive anemia there was no evidence of hemolysis, iron deficiency, B12 or folate deficiency.  Bone marrow biopsy was not pursued.  In summer of 2015 she had progressive fatigue, dyspnea on exertion and worsening anemia with a hemoglobin now of 9.  Workup at this time did suggest a hemolytic anemia with elevated LDH at 709, undetectable haptoglobin and elevated unconjugated bilirubin at 3.3.  Monospecific MARIKA was positive for both IgG and complement.  Cold agglutinin screen was positive with very low titer 1:64.  She was started on prednisone 60 mg daily with supplementation of iron folate and B12 starting 7/31/15.  Her prednisone has been slowly tapered and was discontinued off in January 2016.  She was last followed at [facility] on March 10 when she had stable hemoglobin.      SUBJECTIVE   Tricia comes alone for this clinic visit for  "her hemolytic anemia.    Tricia is doing well overall.  She continues to have fatigue. She notes that she has a hard time lifting a bag of bird seeds. She has difficult time walking around a grocery store and has to lean on the grocery cart. She has to lean on the counters in check out lines.      She is here with labs done prior to clinic visit.     Wt Readings from Last 10 Encounters:   11/13/18 70.8 kg (156 lb)   10/16/18 70.2 kg (154 lb 12.8 oz)   09/27/18 69.1 kg (152 lb 6.4 oz)   09/19/18 67.8 kg (149 lb 7.6 oz)   09/04/18 67.6 kg (149 lb)   08/15/18 68.2 kg (150 lb 4.8 oz)   07/23/18 68.5 kg (151 lb)   07/10/18 69.9 kg (154 lb 1.6 oz)   05/29/18 70.2 kg (154 lb 12.8 oz)   05/23/18 70.3 kg (155 lb)       PAST MEDICAL HISTORY   1. Common variable immunodeficiency syndrome  2. 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 Selective IgA deficiency  4. Leukopenia with markedly low CD4 counts on Bactrim prophylaxis  5. History of fall with subdural hematoma in 6/11/14 with complete resolution  6. Hashimoto's disease status post partial thyroidectomy      CURRENT OUTPATIENT MEDICATIONS     Current Outpatient Prescriptions   Medication Sig     cyanocobalamin (VITAMIN  B-12) 1000 MCG tablet      FLUOCINOLONE ACETONIDE SCALP 0.01 % OIL oil      folic acid (FOLVITE) 1 MG tablet      ketoconazole 1 % shampoo      levothyroxine (SYNTHROID/LEVOTHROID) 150 MCG tablet TAKE 1 TABLET BY MOUTH ONCE DAILY     sulfamethoxazole-trimethoprim (BACTRIM DS/SEPTRA DS) 800-160 MG per tablet Take 1 pill orally on Mon, Wed and Friday     No current facility-administered medications for this visit.         ALLERGIES     Allergies   Allergen Reactions     Doxycycline         REVIEW OF SYSTEMS   As above in the HPI, o/w complete 12-point ROS was negative.     PHYSICAL EXAM   /68  Pulse 73  Temp 97.5  F (36.4  C) (Tympanic)  Resp 16  Ht 1.854 m (6' 1\")  Wt 70.8 kg (156 lb)  SpO2 99%  BMI " 20.58 kg/m2    SpO2 Readings from Last 4 Encounters:   11/13/18 99%   10/16/18 99%   09/27/18 98%   09/19/18 100%     Wt Readings from Last 3 Encounters:   11/13/18 70.8 kg (156 lb)   10/16/18 70.2 kg (154 lb 12.8 oz)   09/27/18 69.1 kg (152 lb 6.4 oz)     GEN: NAD  HEENT: PERRL, EOMI, no icterus, injection or pallor. Oropharynx is clear.  NECK: no cervical or supraclavicular lymphadenopathy  LUNGS: clear bilaterally  CV: regular, no murmurs, rubs, or gallops  ABDOMEN: soft, non-tender, non-distended, normal bowel sounds, no hepatosplenomegaly by percussion or palpation  EXT: warm, well perfused, no edema  NEURO: alert  SKIN: no rashes     LABORATORY AND IMAGING STUDIES     Recent Labs   Lab Test  11/06/18   1002  10/16/18   1036  08/21/18   1012  05/23/18   0959  02/27/18   0931   NA  140  139  139  140  138   POTASSIUM  4.2  3.8  4.1  4.2  4.0   CHLORIDE  105  106  107  107  105   CO2  26  25  26  26  29   ANIONGAP  9  8  6  7  4   BUN  14  13  11  13  14   CR  0.86  0.63  0.68  0.69  0.81   GLC  95  123*  85  105*  82   CULLEN  8.8  7.9*  8.2*  8.4*  8.5     No results for input(s): MAG, PHOS in the last 70162 hours.  Recent Labs   Lab Test  11/06/18   1002  10/16/18   1036  08/21/18   1012  05/23/18   0959  02/27/18   0931   WBC  4.2  5.4  4.7  4.9  4.7   HGB  11.9  10.8*  10.8*  11.7  12.4   PLT  155  148*  160  158  154   MCV  89  91  91  89  89   NEUTROPHIL  59.9  68.5  62.5  62.4  61.2     Recent Labs   Lab Test  11/06/18   1002  10/16/18   1036  08/21/18   1012  05/23/18   0959   BILITOTAL  0.8  0.7  0.8  0.8   ALKPHOS  117  130  129  113   ALT  31  25  34  31   AST  44  41  43  42   ALBUMIN  4.0  3.8  3.7  3.9   LDH  306*  315*   --   348*     TSH   Date Value Ref Range Status   10/16/2018 2.81 0.40 - 4.00 mU/L Final   10/20/2017 1.90 0.40 - 4.00 mU/L Final   06/19/2017 1.82 0.40 - 4.00 mU/L Final     Recent Labs   Lab Test  11/06/18   1002  10/16/18   1036  08/21/18   1012  05/23/18   0959  02/27/18   0931    04/03/17   1132  02/06/17   1351   02/25/16   0903   IGA  <7*  <7*  <7*   --    --    --   <7*  <7*   < >   --    IGG  974  889  659*  719  878   < >  773  697   < >   --    IGM  130  131  98  58*  78   < >  133  82   < >   --     < > = values in this interval not displayed.          ASSESSMENT AND PLAN   1. 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 Common variable immunodeficiency disorder  4. Splenomegaly    Tricia continues to have fatigue which has been bothering her more. She has to lean on the grocery cart during her groceries which is little unexpected. Her hgb is normal at 11.9 g/dl and should not be contributing to this. There was a question from family about her protein intake. Her albumin is completely normal. I will have her follow this with her PCP and do some aerobic exercises. This could be related to cardiac illness or simply cardiac deconditioning.     I reviewed all her labs with her. They have been relatively stable since last visit. For the most important one - her Hgb has declined to 10.8 g/dl this time. She continues to have no Ig A, though her Ig G/Ig M are stable with replacement. She continues to hemolyze and has undetectable haptoglobin.  It is quite likely that this decline in hemoglobin is because we spaced out her IVIG transfusions to every 6 weeks instead of every 4 weeks.     I will continue with her infusions every 4 weeks.     She was originally worried that her insurance would change the next year and she would have a much higher co-pay for each IVIG infusion.  We could try some alternative infusions/chemotherapies but they may also be quite expensive and carry a high co-pay.    I will see her in 12 weeks with labs.        Jose Summers  Adj ,  Division of Hematology, Oncology & Transplantation  [facility].

## 2018-11-13 NOTE — PATIENT INSTRUCTIONS
IV Ig  infusions every 4 weeks including today-Scheduled. Fadumo TALLEY    Follow up in 12 weeks with labs prior to visit to see me-Scheduled. Fadumo TALLEY    CBC w Diff, CMP and LDH; B12, retic, ferritin, iron panel, Ig subsets, haptoglobin-Per pt request, she will have labs done at [facility]. Fadumo TALLEY  AVS printed and given to Pt. Fadumo DO.

## 2018-11-13 NOTE — LETTER
11/13/2018         RE: Tricia Sosa  [address]  [place] [address]        Dear Colleague,    Thank you for referring your patient, Tricia Sosa, to the [facility]. Please see a copy of my visit note below.    [facility]  FOLLOW-UP VISIT NOTE    PATIENT NAME: Tricia Sosa MRN # [id number]  DATE OF VISIT: Nov 13, 2018 YOB: 1940    REFERRING PROVIDER: No referring provider defined for this encounter.    DIAGNOSIS: Hemolytic anemia-autoimmune; IgA deficiency    TREATMENT SUMMARY:  Tricia has been diagnosed with IgA deficiency since her around 2000.  She was very sick at the time and had severe diarrhea.  She lost about 40 pounds in weight.  The workup revealed that she had markedly diminished CD4 counts of 48 and was diagnosed with CMV colitis.  Since then she has been followed in hematology clinic at [place] until recently.  She was noted to have anemia which was fairly long-standing.  She was initially referred for anemia in 2004 and also had a bone marrow biopsy done at that time which revealed hypercellular bone marrow with 70-80% cellularity and normal trilineage hematopoiesis and no morphologic features of MDS or lymphoproliferation.  Her anemia was attributed to her chronic underlying disease.  At the next evaluation in 2002 on 4 aggressive anemia there was no evidence of hemolysis, iron deficiency, B12 or folate deficiency.  Bone marrow biopsy was not pursued.  In summer of 2015 she had progressive fatigue, dyspnea on exertion and worsening anemia with a hemoglobin now of 9.  Workup at this time did suggest a hemolytic anemia with elevated LDH at 709, undetectable haptoglobin and elevated unconjugated bilirubin at 3.3.  Monospecific MARIKA was positive for both IgG and complement.  Cold agglutinin screen was positive with very low titer 1:64.  She was started on prednisone 60 mg daily with supplementation of iron folate  and B12 starting 7/31/15.  Her prednisone has been slowly tapered and was discontinued off in January 2016.  She was last followed at [facility] on March 10 when she had stable hemoglobin.      SUBJECTIVE   Tricia comes alone for this clinic visit for her hemolytic anemia.    Tricia is doing well overall.  She continues to have fatigue. She notes that she has a hard time lifting a bag of bird seeds. She has difficult time walking around a grocery store and has to lean on the grocery cart. She has to lean on the counters in check out lines.      She is here with labs done prior to clinic visit.     Wt Readings from Last 10 Encounters:   11/13/18 70.8 kg (156 lb)   10/16/18 70.2 kg (154 lb 12.8 oz)   09/27/18 69.1 kg (152 lb 6.4 oz)   09/19/18 67.8 kg (149 lb 7.6 oz)   09/04/18 67.6 kg (149 lb)   08/15/18 68.2 kg (150 lb 4.8 oz)   07/23/18 68.5 kg (151 lb)   07/10/18 69.9 kg (154 lb 1.6 oz)   05/29/18 70.2 kg (154 lb 12.8 oz)   05/23/18 70.3 kg (155 lb)       PAST MEDICAL HISTORY   1. Common variable immunodeficiency syndrome  2. 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 Selective IgA deficiency  4. Leukopenia with markedly low CD4 counts on Bactrim prophylaxis  5. History of fall with subdural hematoma in 6/11/14 with complete resolution  6. Hashimoto's disease status post partial thyroidectomy      CURRENT OUTPATIENT MEDICATIONS     Current Outpatient Prescriptions   Medication Sig     cyanocobalamin (VITAMIN  B-12) 1000 MCG tablet      FLUOCINOLONE ACETONIDE SCALP 0.01 % OIL oil      folic acid (FOLVITE) 1 MG tablet      ketoconazole 1 % shampoo      levothyroxine (SYNTHROID/LEVOTHROID) 150 MCG tablet TAKE 1 TABLET BY MOUTH ONCE DAILY     sulfamethoxazole-trimethoprim (BACTRIM DS/SEPTRA DS) 800-160 MG per tablet Take 1 pill orally on Mon, Wed and Friday     No current facility-administered medications for this visit.         ALLERGIES     Allergies   Allergen Reactions  "    Doxycycline         REVIEW OF SYSTEMS   As above in the HPI, o/w complete 12-point ROS was negative.     PHYSICAL EXAM   /68  Pulse 73  Temp 97.5  F (36.4  C) (Tympanic)  Resp 16  Ht 1.854 m (6' 1\")  Wt 70.8 kg (156 lb)  SpO2 99%  BMI 20.58 kg/m2    SpO2 Readings from Last 4 Encounters:   11/13/18 99%   10/16/18 99%   09/27/18 98%   09/19/18 100%     Wt Readings from Last 3 Encounters:   11/13/18 70.8 kg (156 lb)   10/16/18 70.2 kg (154 lb 12.8 oz)   09/27/18 69.1 kg (152 lb 6.4 oz)     GEN: NAD  HEENT: PERRL, EOMI, no icterus, injection or pallor. Oropharynx is clear.  NECK: no cervical or supraclavicular lymphadenopathy  LUNGS: clear bilaterally  CV: regular, no murmurs, rubs, or gallops  ABDOMEN: soft, non-tender, non-distended, normal bowel sounds, no hepatosplenomegaly by percussion or palpation  EXT: warm, well perfused, no edema  NEURO: alert  SKIN: no rashes     LABORATORY AND IMAGING STUDIES     Recent Labs   Lab Test  11/06/18   1002  10/16/18   1036  08/21/18   1012  05/23/18   0959  02/27/18   0931   NA  140  139  139  140  138   POTASSIUM  4.2  3.8  4.1  4.2  4.0   CHLORIDE  105  106  107  107  105   CO2  26  25  26  26  29   ANIONGAP  9  8  6  7  4   BUN  14  13  11  13  14   CR  0.86  0.63  0.68  0.69  0.81   GLC  95  123*  85  105*  82   CULLEN  8.8  7.9*  8.2*  8.4*  8.5     No results for input(s): MAG, PHOS in the last 86328 hours.  Recent Labs   Lab Test  11/06/18   1002  10/16/18   1036  08/21/18   1012  05/23/18   0959  02/27/18   0931   WBC  4.2  5.4  4.7  4.9  4.7   HGB  11.9  10.8*  10.8*  11.7  12.4   PLT  155  148*  160  158  154   MCV  89  91  91  89  89   NEUTROPHIL  59.9  68.5  62.5  62.4  61.2     Recent Labs   Lab Test  11/06/18   1002  10/16/18   1036  08/21/18   1012  05/23/18   0959   BILITOTAL  0.8  0.7  0.8  0.8   ALKPHOS  117  130  129  113   ALT  31  25  34  31   AST  44  41  43  42   ALBUMIN  4.0  3.8  3.7  3.9   LDH  306*  315*   --   348*     TSH   Date Value " Ref Range Status   10/16/2018 2.81 0.40 - 4.00 mU/L Final   10/20/2017 1.90 0.40 - 4.00 mU/L Final   06/19/2017 1.82 0.40 - 4.00 mU/L Final     Recent Labs   Lab Test  11/06/18   1002  10/16/18   1036  08/21/18   1012  05/23/18   0959  02/27/18   0931   04/03/17   1132  02/06/17   1351   02/25/16   0903   IGA  <7*  <7*  <7*   --    --    --   <7*  <7*   < >   --    IGG  974  889  659*  719  878   < >  773  697   < >   --    IGM  130  131  98  58*  78   < >  133  82   < >   --     < > = values in this interval not displayed.          ASSESSMENT AND PLAN   1. 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 Common variable immunodeficiency disorder  4. Splenomegaly    Tricia continues to have fatigue which has been bothering her more. She has to lean on the grocery cart during her groceries which is little unexpected. Her hgb is normal at 11.9 g/dl and should not be contributing to this. There was a question from family about her protein intake. Her albumin is completely normal. I will have her follow this with her PCP and do some aerobic exercises. This could be related to cardiac illness or simply cardiac deconditioning.     I reviewed all her labs with her. They have been relatively stable since last visit. For the most important one - her Hgb has declined to 10.8 g/dl this time. She continues to have no Ig A, though her Ig G/Ig M are stable with replacement. She continues to hemolyze and has undetectable haptoglobin.  It is quite likely that this decline in hemoglobin is because we spaced out her IVIG transfusions to every 6 weeks instead of every 4 weeks.     I will continue with her infusions every 4 weeks.     She was originally worried that her insurance would change the next year and she would have a much higher co-pay for each IVIG infusion.  We could try some alternative infusions/chemotherapies but they may also be  quite expensive and carry a high co-pay.    I will see her in 12 weeks with labs.        Jose Spain ,  Division of Hematology, Oncology & Transplantation  [facility].        Again, thank you for allowing me to participate in the care of your patient.        Sincerely,        Jose Summers MD

## 2018-11-13 NOTE — NURSING NOTE
"Oncology Rooming Note    November 13, 2018 9:44 AM   Tricia Sosa is a 78 year old female who presents for:    Chief Complaint   Patient presents with     Oncology Clinic Visit     CVID     Initial Vitals: /68  Pulse 73  Temp 97.5  F (36.4  C) (Tympanic)  Resp 16  Ht 1.854 m (6' 1\")  Wt 70.8 kg (156 lb)  SpO2 99%  BMI 20.58 kg/m2 Estimated body mass index is 20.58 kg/(m^2) as calculated from the following:    Height as of this encounter: 1.854 m (6' 1\").    Weight as of this encounter: 70.8 kg (156 lb). Body surface area is 1.91 meters squared.  No Pain (0) Comment: Data Unavailable   No LMP recorded. Patient is postmenopausal.  Allergies reviewed: Yes  Medications reviewed: Yes    Medications: MEDICATION REFILLS NEEDED TODAY. Provider was notified.  Pharmacy name entered into [employer]:    [id number] - [place], [place] - [address] [address] [facility]    Clinical concerns: CVID     8 minutes for nursing intake (face to face time)     Tricia Marshall CMA            DISCHARGE PLAN:  Next appointments: See patient instruction section  Departure Mode: Ambulatory  Accompanied by: self  0 minutes for nursing discharge (face to face time)   Tricia Marshall CMA      "

## 2018-11-15 DIAGNOSIS — E03.9 HYPOTHYROIDISM, UNSPECIFIED TYPE: ICD-10-CM

## 2018-11-15 RX ORDER — LEVOTHYROXINE SODIUM 150 UG/1
TABLET ORAL
Qty: 90 TABLET | Refills: 2 | Status: SHIPPED | OUTPATIENT
Start: 2018-11-15 | End: 2019-08-23

## 2018-11-15 NOTE — TELEPHONE ENCOUNTER
"Requested Prescriptions   Pending Prescriptions Disp Refills     levothyroxine (SYNTHROID/LEVOTHROID) 150 MCG tablet [Pharmacy Med Name: Levothyroxine Sodium Oral Tablet 150 MCG]  Last Written Prescription Date:  10/11/18  Last Fill Quantity: 30,  # refills: 0   Last office visit: 9/27/2018 with prescribing provider:  Monika Whipple Ra, APRN CNP    Future Office Visit:   Next 5 appointments (look out 90 days)     Feb 05, 2019  9:15 AM CST   Return Visit with Jose Summers MD   [facility] ([facility])    [facility]  [address]  [address]  [facility] [address]   [phone]                  30 tablet 0     Sig: TAKE 1 TABLET BY MOUTH ONCE DAILY    Thyroid Protocol Passed    11/15/2018  6:24 AM       Passed - Patient is 12 years or older       Passed - Recent (12 mo) or future (30 days) visit within the authorizing provider's specialty    Patient had office visit in the last 12 months or has a visit in the next 30 days with authorizing provider or within the authorizing provider's specialty.  See \"Patient Info\" tab in inbasket, or \"Choose Columns\" in Meds & Orders section of the refill encounter.             Passed - Normal TSH on file in past 12 months    Recent Labs   Lab Test  10/16/18   1036   TSH  2.81             Passed - No active pregnancy on record    If patient is pregnant or has had a positive pregnancy test, please check TSH.         Passed - No positive pregnancy test in past 12 months    If patient is pregnant or has had a positive pregnancy test, please check TSH.            "

## 2018-11-16 NOTE — TELEPHONE ENCOUNTER
Prescription approved per [facility], [facility] or [facility] refill protocol.  Sailaja Sutton RN - Triage  [facility]

## 2018-12-11 ENCOUNTER — INFUSION THERAPY VISIT (OUTPATIENT)
Dept: INFUSION THERAPY | Facility: CLINIC | Age: 78
End: 2018-12-11
Attending: INTERNAL MEDICINE
Payer: MEDICARE

## 2018-12-11 VITALS
TEMPERATURE: 97.5 F | RESPIRATION RATE: 16 BRPM | OXYGEN SATURATION: 99 % | BODY MASS INDEX: 20.48 KG/M2 | WEIGHT: 155.2 LBS | DIASTOLIC BLOOD PRESSURE: 68 MMHG | SYSTOLIC BLOOD PRESSURE: 117 MMHG | HEART RATE: 75 BPM

## 2018-12-11 DIAGNOSIS — D83.9 CVID (COMMON VARIABLE IMMUNODEFICIENCY) (H): Primary | ICD-10-CM

## 2018-12-11 PROCEDURE — 96365 THER/PROPH/DIAG IV INF INIT: CPT

## 2018-12-11 PROCEDURE — 96375 TX/PRO/DX INJ NEW DRUG ADDON: CPT

## 2018-12-11 PROCEDURE — 96366 THER/PROPH/DIAG IV INF ADDON: CPT

## 2018-12-11 PROCEDURE — 25000128 H RX IP 250 OP 636: Performed by: INTERNAL MEDICINE

## 2018-12-11 RX ORDER — DIPHENHYDRAMINE HCL 25 MG
25 CAPSULE ORAL
Status: CANCELLED | OUTPATIENT
Start: 2018-12-11

## 2018-12-11 RX ORDER — ACETAMINOPHEN 325 MG/1
650 TABLET ORAL
Status: CANCELLED
Start: 2018-12-11

## 2018-12-11 RX ADMIN — HUMAN IMMUNOGLOBULIN G 35 G: 20 LIQUID INTRAVENOUS at 11:56

## 2018-12-11 RX ADMIN — HYDROCORTISONE SODIUM SUCCINATE 100 MG: 100 INJECTION, POWDER, FOR SOLUTION INTRAMUSCULAR; INTRAVENOUS at 11:46

## 2018-12-11 ASSESSMENT — PAIN SCALES - GENERAL: PAINLEVEL: NO PAIN (0)

## 2018-12-11 NOTE — PROGRESS NOTES
Infusion Nursing Note:  Tricia Sosa presents today for IVIG.    Patient seen by provider today: No   present during visit today: Not Applicable.    Note: N/A.    Intravenous Access:  Peripheral IV placed.    Treatment Conditions:  Biological Infusion Checklist:    ~~~ NOTE: If the patient answers yes to any of the questions below, hold the infusion and contact ordering provider or on-call provider.    1. Have you recently had an elevated temperature, fever, chills, productive cough, coughing for 3 weeks or longer or hemoptysis,  abnormal vital signs, night sweats,  chest pain or have you noticed a decrease in your appetite, unexplained weight loss or fatigue? No  2. Do you have any open wounds or new incisions? No  3. Do you have any recent or upcoming hospitalizations, surgeries or dental procedures? No  4. Do you currently have or recently have had any signs of illness or infection or are you on any antibiotics? No  5. Have you had any new, sudden or worsening abdominal pain? No  6. Have you or anyone in your household received a live vaccination in the past 4 weeks? Please note:  No live vaccines while on biologic/chemotherapy until 6 months after the last treatment.  Patient can receive the flu vaccine (shot only) and the pneumovax.  It is optimal for the patient to get these vaccines mid cycle, but they can be given at any time as long as it is not on the day of the infusion. No  7. Have you recently been diagnosed with any new nervous system diseases (ie. Multiple sclerosis, Guillain [place], seizures, neurological changes) or cancer diagnosis? Are you on any form of radiation or chemotherapy? No  8. Are you pregnant or breast feeding or do you have plans of pregnancy in the future? No  9. Have you been having any signs of worsening depression or suicidal ideations?  (benlysta only) No  10. Have there been any other new onset medical symptoms? No        Post Infusion Assessment:  Patient tolerated  infusion without incident.  Blood return noted pre and post infusion.  Site patent and intact, free from redness, edema or discomfort.  No evidence of extravasations.  Access discontinued per protocol.    Discharge Plan:   Discharge instructions reviewed with: Patient.  Patient and/or family verbalized understanding of discharge instructions and all questions answered.  Copy of AVS reviewed with patient and/or family.  Patient will return 1/8/19 for next appointment.  Patient discharged in stable condition accompanied by: self.  Departure Mode: Ambulatory.    Tricia Iyer RN

## 2019-01-08 ENCOUNTER — INFUSION THERAPY VISIT (OUTPATIENT)
Dept: INFUSION THERAPY | Facility: CLINIC | Age: 79
End: 2019-01-08
Attending: INTERNAL MEDICINE
Payer: MEDICARE

## 2019-01-08 VITALS
HEART RATE: 68 BPM | DIASTOLIC BLOOD PRESSURE: 69 MMHG | OXYGEN SATURATION: 100 % | TEMPERATURE: 97.2 F | WEIGHT: 154.54 LBS | SYSTOLIC BLOOD PRESSURE: 128 MMHG | RESPIRATION RATE: 16 BRPM | BODY MASS INDEX: 20.39 KG/M2

## 2019-01-08 DIAGNOSIS — D83.9 CVID (COMMON VARIABLE IMMUNODEFICIENCY) (H): Primary | ICD-10-CM

## 2019-01-08 PROCEDURE — 96375 TX/PRO/DX INJ NEW DRUG ADDON: CPT

## 2019-01-08 PROCEDURE — 96366 THER/PROPH/DIAG IV INF ADDON: CPT

## 2019-01-08 PROCEDURE — 96365 THER/PROPH/DIAG IV INF INIT: CPT

## 2019-01-08 PROCEDURE — 25000128 H RX IP 250 OP 636: Performed by: INTERNAL MEDICINE

## 2019-01-08 RX ORDER — DIPHENHYDRAMINE HCL 25 MG
25 CAPSULE ORAL
Status: CANCELLED | OUTPATIENT
Start: 2019-01-08

## 2019-01-08 RX ORDER — ACETAMINOPHEN 325 MG/1
650 TABLET ORAL
Status: CANCELLED
Start: 2019-01-08

## 2019-01-08 RX ADMIN — HYDROCORTISONE SODIUM SUCCINATE 100 MG: 100 INJECTION, POWDER, FOR SOLUTION INTRAMUSCULAR; INTRAVENOUS at 10:22

## 2019-01-08 RX ADMIN — HUMAN IMMUNOGLOBULIN G 35 G: 20 LIQUID INTRAVENOUS at 10:32

## 2019-01-08 NOTE — PROGRESS NOTES
Infusion Nursing Note:  Tricia Sosa presents today for IVIG and Solu-Cortef.    Patient seen by provider today: No   present during visit today: Not Applicable.    Note: Per Dr. Summers patient can start at 0.5 mg/kg per hour and be increased by 1 mg/kg per hour to a max of 4 mg/kg per hour.     Intravenous Access:  Peripheral IV placed.    Treatment Conditions:  Results reviewed, labs MET treatment parameters, ok to proceed with treatment.  Biological Infusion Checklist:    ~~~ NOTE: If the patient answers yes to any of the questions below, hold the infusion and contact ordering provider or on-call provider.    1. Have you recently had an elevated temperature, fever, chills, productive cough, coughing for 3 weeks or longer or hemoptysis,  abnormal vital signs, night sweats,  chest pain or have you noticed a decrease in your appetite, unexplained weight loss or fatigue? No  2. Do you have any open wounds or new incisions? No  3. Do you have any recent or upcoming hospitalizations, surgeries or dental procedures? No  4. Do you currently have or recently have had any signs of illness or infection or are you on any antibiotics? No  5. Have you had any new, sudden or worsening abdominal pain? No  6. Have you or anyone in your household received a live vaccination in the past 4 weeks? Please note:  No live vaccines while on biologic/chemotherapy until 6 months after the last treatment.  Patient can receive the flu vaccine (shot only) and the pneumovax.  It is optimal for the patient to get these vaccines mid cycle, but they can be given at any time as long as it is not on the day of the infusion. No  7. Have you recently been diagnosed with any new nervous system diseases (ie. Multiple sclerosis, Guillain [place], seizures, neurological changes) or cancer diagnosis? Are you on any form of radiation or chemotherapy? No  8. Are you pregnant or breast feeding or do you have plans of pregnancy in the future?  No  9. Have you been having any signs of worsening depression or suicidal ideations?  (benlysta only) No  10. Have there been any other new onset medical symptoms? No        Post Infusion Assessment:  Patient tolerated infusion without incident.  Blood return noted pre and post infusion.  Site patent and intact, free from redness, edema or discomfort.  No evidence of extravasations.  Access discontinued per protocol.  Biologic Infusion Post Education: Call the triage nurse at your clinic or seek medical attention if you have chills and/or temperature greater than or equal to 100.5, uncontrolled nausea/vomiting, diarrhea, constipation, dizziness, shortness of breath, chest pain, heart palpitations, weakness or any other new or concerning symptoms, questions or concerns.  You cannot have any live virus vaccines prior to or during treatment or up to 6 months post infusion.  If you have an upcoming surgery, medical procedure or dental procedure during treatment, this should be discussed with your ordering physician and your surgeon/dentist.  If you are having any concerning symptom, if you are unsure if you should get your next infusion or wish to speak to a provider before your next infusion, please call your care coordinator or triage nurse at your clinic to notify them so we can adequately serve you.    Discharge Plan:   Discharge instructions reviewed with: Patient.  Patient and/or family verbalized understanding of discharge instructions and all questions answered.  AVS to patient via [employer].  Patient will return 2/5/19 for next appointment.   Patient discharged in stable condition accompanied by: self.  Departure Mode: Ambulatory.    Sarah Hill RN

## 2019-01-29 DIAGNOSIS — E53.8 B12 DEFICIENCY: ICD-10-CM

## 2019-01-29 DIAGNOSIS — D59.19 OTHER AUTOIMMUNE HEMOLYTIC ANEMIAS: ICD-10-CM

## 2019-01-29 DIAGNOSIS — D83.9 CVID (COMMON VARIABLE IMMUNODEFICIENCY) (H): ICD-10-CM

## 2019-01-29 LAB
ALBUMIN SERPL-MCNC: 3.8 G/DL (ref 3.4–5)
ALP SERPL-CCNC: 107 U/L (ref 40–150)
ALT SERPL W P-5'-P-CCNC: 29 U/L (ref 0–50)
ANION GAP SERPL CALCULATED.3IONS-SCNC: 5 MMOL/L (ref 3–14)
AST SERPL W P-5'-P-CCNC: 41 U/L (ref 0–45)
BASOPHILS # BLD AUTO: 0 10E9/L (ref 0–0.2)
BASOPHILS NFR BLD AUTO: 0.2 %
BILIRUB SERPL-MCNC: 0.7 MG/DL (ref 0.2–1.3)
BUN SERPL-MCNC: 16 MG/DL (ref 7–30)
CALCIUM SERPL-MCNC: 8.5 MG/DL (ref 8.5–10.1)
CHLORIDE SERPL-SCNC: 106 MMOL/L (ref 94–109)
CO2 SERPL-SCNC: 29 MMOL/L (ref 20–32)
CREAT SERPL-MCNC: 0.74 MG/DL (ref 0.52–1.04)
DIFFERENTIAL METHOD BLD: ABNORMAL
EOSINOPHIL # BLD AUTO: 0.1 10E9/L (ref 0–0.7)
EOSINOPHIL NFR BLD AUTO: 1.3 %
ERYTHROCYTE [DISTWIDTH] IN BLOOD BY AUTOMATED COUNT: 13.7 % (ref 10–15)
GFR SERPL CREATININE-BSD FRML MDRD: 77 ML/MIN/{1.73_M2}
GLUCOSE SERPL-MCNC: 65 MG/DL (ref 70–99)
HAPTOGLOB SERPL-MCNC: <6 MG/DL (ref 35–175)
HCT VFR BLD AUTO: 34.2 % (ref 35–47)
HGB BLD-MCNC: 11.2 G/DL (ref 11.7–15.7)
IGA SERPL-MCNC: <7 MG/DL (ref 70–380)
IGG SERPL-MCNC: 969 MG/DL (ref 695–1620)
IGM SERPL-MCNC: 91 MG/DL (ref 60–265)
LDH SERPL L TO P-CCNC: 269 U/L (ref 81–234)
LYMPHOCYTES # BLD AUTO: 1.3 10E9/L (ref 0.8–5.3)
LYMPHOCYTES NFR BLD AUTO: 29.6 %
MCH RBC QN AUTO: 29.5 PG (ref 26.5–33)
MCHC RBC AUTO-ENTMCNC: 32.7 G/DL (ref 31.5–36.5)
MCV RBC AUTO: 90 FL (ref 78–100)
MONOCYTES # BLD AUTO: 0.7 10E9/L (ref 0–1.3)
MONOCYTES NFR BLD AUTO: 15.5 %
NEUTROPHILS # BLD AUTO: 2.4 10E9/L (ref 1.6–8.3)
NEUTROPHILS NFR BLD AUTO: 53.4 %
PLATELET # BLD AUTO: 145 10E9/L (ref 150–450)
POTASSIUM SERPL-SCNC: 4 MMOL/L (ref 3.4–5.3)
PROT SERPL-MCNC: 6.6 G/DL (ref 6.8–8.8)
RBC # BLD AUTO: 3.8 10E12/L (ref 3.8–5.2)
RETICS # AUTO: 80.9 10E9/L (ref 25–95)
RETICS/RBC NFR AUTO: 2.1 % (ref 0.5–2)
SODIUM SERPL-SCNC: 140 MMOL/L (ref 133–144)
VIT B12 SERPL-MCNC: 696 PG/ML (ref 193–986)
WBC # BLD AUTO: 4.5 10E9/L (ref 4–11)

## 2019-01-29 PROCEDURE — 85045 AUTOMATED RETICULOCYTE COUNT: CPT | Performed by: INTERNAL MEDICINE

## 2019-01-29 PROCEDURE — 82607 VITAMIN B-12: CPT | Performed by: INTERNAL MEDICINE

## 2019-01-29 PROCEDURE — 82784 ASSAY IGA/IGD/IGG/IGM EACH: CPT | Performed by: INTERNAL MEDICINE

## 2019-01-29 PROCEDURE — 83615 LACTATE (LD) (LDH) ENZYME: CPT | Performed by: INTERNAL MEDICINE

## 2019-01-29 PROCEDURE — 80053 COMPREHEN METABOLIC PANEL: CPT | Performed by: INTERNAL MEDICINE

## 2019-01-29 PROCEDURE — 85025 COMPLETE CBC W/AUTO DIFF WBC: CPT | Performed by: INTERNAL MEDICINE

## 2019-01-29 PROCEDURE — 36415 COLL VENOUS BLD VENIPUNCTURE: CPT | Performed by: INTERNAL MEDICINE

## 2019-01-29 PROCEDURE — 83010 ASSAY OF HAPTOGLOBIN QUANT: CPT | Performed by: INTERNAL MEDICINE

## 2019-02-05 ENCOUNTER — ONCOLOGY VISIT (OUTPATIENT)
Dept: ONCOLOGY | Facility: CLINIC | Age: 79
End: 2019-02-05
Attending: INTERNAL MEDICINE
Payer: MEDICARE

## 2019-02-05 ENCOUNTER — INFUSION THERAPY VISIT (OUTPATIENT)
Dept: INFUSION THERAPY | Facility: CLINIC | Age: 79
End: 2019-02-05
Attending: INTERNAL MEDICINE
Payer: MEDICARE

## 2019-02-05 VITALS
HEART RATE: 73 BPM | DIASTOLIC BLOOD PRESSURE: 59 MMHG | WEIGHT: 154.2 LBS | BODY MASS INDEX: 20.88 KG/M2 | HEIGHT: 72 IN | RESPIRATION RATE: 16 BRPM | TEMPERATURE: 97.3 F | OXYGEN SATURATION: 99 % | SYSTOLIC BLOOD PRESSURE: 119 MMHG

## 2019-02-05 DIAGNOSIS — D83.9 CVID (COMMON VARIABLE IMMUNODEFICIENCY) (H): Primary | ICD-10-CM

## 2019-02-05 PROCEDURE — 25000128 H RX IP 250 OP 636: Performed by: INTERNAL MEDICINE

## 2019-02-05 PROCEDURE — 96375 TX/PRO/DX INJ NEW DRUG ADDON: CPT

## 2019-02-05 PROCEDURE — G0463 HOSPITAL OUTPT CLINIC VISIT: HCPCS | Mod: 25

## 2019-02-05 PROCEDURE — 96366 THER/PROPH/DIAG IV INF ADDON: CPT

## 2019-02-05 PROCEDURE — 96365 THER/PROPH/DIAG IV INF INIT: CPT

## 2019-02-05 PROCEDURE — 99213 OFFICE O/P EST LOW 20 MIN: CPT | Performed by: INTERNAL MEDICINE

## 2019-02-05 RX ORDER — DIPHENHYDRAMINE HCL 25 MG
25 CAPSULE ORAL
Status: CANCELLED | OUTPATIENT
Start: 2019-02-05

## 2019-02-05 RX ORDER — ACETAMINOPHEN 325 MG/1
650 TABLET ORAL
Status: CANCELLED
Start: 2019-02-05

## 2019-02-05 RX ADMIN — HUMAN IMMUNOGLOBULIN G 35 G: 20 LIQUID INTRAVENOUS at 10:33

## 2019-02-05 RX ADMIN — HYDROCORTISONE SODIUM SUCCINATE 100 MG: 100 INJECTION, POWDER, FOR SOLUTION INTRAMUSCULAR; INTRAVENOUS at 10:18

## 2019-02-05 ASSESSMENT — PAIN SCALES - GENERAL: PAINLEVEL: NO PAIN (0)

## 2019-02-05 ASSESSMENT — MIFFLIN-ST. JEOR: SCORE: 1307.33

## 2019-02-05 NOTE — PROGRESS NOTES
Infusion Nursing Note:  Tricia Sosa presents today for IVIG.    Patient seen by provider today: Yes: Dr. Summers   present during visit today: Not Applicable.    Note: IVIG increased by 1mgkg/hr with max of 4 mg/kg/hr.    Intravenous Access:  Peripheral IV placed.    Treatment Conditions:  Biological Infusion Checklist:    ~~~ NOTE: If the patient answers yes to any of the questions below, hold the infusion and contact ordering provider or on-call provider.    1. Have you recently had an elevated temperature, fever, chills, productive cough, coughing for 3 weeks or longer or hemoptysis,  abnormal vital signs, night sweats,  chest pain or have you noticed a decrease in your appetite, unexplained weight loss or fatigue? No  2. Do you have any open wounds or new incisions? No  3. Do you have any recent or upcoming hospitalizations, surgeries or dental procedures? No  4. Do you currently have or recently have had any signs of illness or infection or are you on any antibiotics? No  5. Have you had any new, sudden or worsening abdominal pain? No  6. Have you or anyone in your household received a live vaccination in the past 4 weeks? Please note:  No live vaccines while on biologic/chemotherapy until 6 months after the last treatment.  Patient can receive the flu vaccine (shot only) and the pneumovax.  It is optimal for the patient to get these vaccines mid cycle, but they can be given at any time as long as it is not on the day of the infusion. No  7. Have you recently been diagnosed with any new nervous system diseases (ie. Multiple sclerosis, Guillain [place], seizures, neurological changes) or cancer diagnosis? Are you on any form of radiation or chemotherapy? No  8. Are you pregnant or breast feeding or do you have plans of pregnancy in the future? No  9. Have you been having any signs of worsening depression or suicidal ideations?  (benlysta only) No  10. Have there been any other new onset medical symptoms?  No         Post Infusion Assessment:  Patient tolerated infusion without incident.  Blood return noted pre and post infusion.  Site patent and intact, free from redness, edema or discomfort.  No evidence of extravasations.  Access discontinued per protocol.    Discharge Plan:   Patient declined prescription refills.  Discharge instructions reviewed with: Patient.  Patient and/or family verbalized understanding of discharge instructions and all questions answered.  Copy of AVS reviewed with patient and/or family.  Patient will return 3/5/19 for next appointment.  Patient discharged in stable condition accompanied by: self.  Departure Mode: Ambulatory.    Jocelyn Riggs RN

## 2019-02-05 NOTE — LETTER
2/5/2019         RE: Tricia Sosa  [address]  [place] [address]        Dear Colleague,    Thank you for referring your patient, Tricia Sosa, to the [facility]. Please see a copy of my visit note below.    [facility]  FOLLOW-UP VISIT NOTE    PATIENT NAME: Tricia Sosa MRN # [id number]  DATE OF VISIT: Feb 5, 2019 YOB: 1940    REFERRING PROVIDER: No referring provider defined for this encounter.    DIAGNOSIS: Hemolytic anemia-autoimmune; IgA deficiency    TREATMENT SUMMARY:  Tricia has been diagnosed with IgA deficiency since her around 2000.  She was very sick at the time and had severe diarrhea.  She lost about 40 pounds in weight.  The workup revealed that she had markedly diminished CD4 counts of 48 and was diagnosed with CMV colitis.  Since then she has been followed in hematology clinic at [place] until recently.  She was noted to have anemia which was fairly long-standing.  She was initially referred for anemia in 2004 and also had a bone marrow biopsy done at that time which revealed hypercellular bone marrow with 70-80% cellularity and normal trilineage hematopoiesis and no morphologic features of MDS or lymphoproliferation.  Her anemia was attributed to her chronic underlying disease.  At the next evaluation in 2002 on 4 aggressive anemia there was no evidence of hemolysis, iron deficiency, B12 or folate deficiency.  Bone marrow biopsy was not pursued.  In summer of 2015 she had progressive fatigue, dyspnea on exertion and worsening anemia with a hemoglobin now of 9.  Workup at this time did suggest a hemolytic anemia with elevated LDH at 709, undetectable haptoglobin and elevated unconjugated bilirubin at 3.3.  Monospecific MARIKA was positive for both IgG and complement.  Cold agglutinin screen was positive with very low titer 1:64.  She was started on prednisone 60 mg daily with supplementation of iron folate  and B12 starting 7/31/15.  Her prednisone has been slowly tapered and was discontinued off in January 2016.  She was last followed at [facility] on March 10 when she had stable hemoglobin.      SUBJECTIVE   Tricia comes alone for this clinic visit for her hemolytic anemia.    Tricia is doing well overall.  She continues to have fatigue. She has no new complains.   She is here with labs done prior to clinic visit.     Wt Readings from Last 10 Encounters:   02/05/19 69.9 kg (154 lb 3.2 oz)   01/08/19 70.1 kg (154 lb 8.7 oz)   12/11/18 70.4 kg (155 lb 3.2 oz)   11/13/18 70.8 kg (156 lb)   10/16/18 70.2 kg (154 lb 12.8 oz)   09/27/18 69.1 kg (152 lb 6.4 oz)   09/19/18 67.8 kg (149 lb 7.6 oz)   09/04/18 67.6 kg (149 lb)   08/15/18 68.2 kg (150 lb 4.8 oz)   07/23/18 68.5 kg (151 lb)       PAST MEDICAL HISTORY   1. Common variable immunodeficiency syndrome  2. 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 Selective IgA deficiency  4. Leukopenia with markedly low CD4 counts on Bactrim prophylaxis  5. History of fall with subdural hematoma in 6/11/14 with complete resolution  6. Hashimoto's disease status post partial thyroidectomy      CURRENT OUTPATIENT MEDICATIONS     Current Outpatient Medications   Medication Sig     cyanocobalamin (VITAMIN  B-12) 1000 MCG tablet      FLUOCINOLONE ACETONIDE SCALP 0.01 % OIL oil      folic acid (FOLVITE) 1 MG tablet      ketoconazole 1 % shampoo      levothyroxine (SYNTHROID/LEVOTHROID) 150 MCG tablet TAKE 1 TABLET BY MOUTH ONCE DAILY     sulfamethoxazole-trimethoprim (BACTRIM DS/SEPTRA DS) 800-160 MG per tablet Take 1 pill orally on Mon, Wed and Friday     No current facility-administered medications for this visit.         ALLERGIES     Allergies   Allergen Reactions     Doxycycline         REVIEW OF SYSTEMS   As above in the HPI, o/w complete 12-point ROS was negative.     PHYSICAL EXAM   /59   Pulse 73   Temp 97.3  F (36.3  C) (Oral)    "Resp 16   Ht 1.854 m (6' 1\")   Wt 69.9 kg (154 lb 3.2 oz)   SpO2 99%   BMI 20.34 kg/m       SpO2 Readings from Last 4 Encounters:   11/13/18 99%   10/16/18 99%   09/27/18 98%   09/19/18 100%     Wt Readings from Last 3 Encounters:   02/05/19 69.9 kg (154 lb 3.2 oz)   01/08/19 70.1 kg (154 lb 8.7 oz)   12/11/18 70.4 kg (155 lb 3.2 oz)     GEN: NAD  HEENT: PERRL, EOMI, no icterus, injection or pallor. Oropharynx is clear.  NECK: no cervical or supraclavicular lymphadenopathy  LUNGS: clear bilaterally  CV: regular, no murmurs, rubs, or gallops  ABDOMEN: soft, non-tender, non-distended, normal bowel sounds, no hepatosplenomegaly by percussion or palpation  EXT: warm, well perfused, no edema  NEURO: alert  SKIN: no rashes     LABORATORY AND IMAGING STUDIES     Recent Labs   Lab Test 01/29/19  1007 11/06/18  1002 10/16/18  1036 08/21/18  1012 05/23/18  0959    140 139 139 140   POTASSIUM 4.0 4.2 3.8 4.1 4.2   CHLORIDE 106 105 106 107 107   CO2 29 26 25 26 26   ANIONGAP 5 9 8 6 7   BUN 16 14 13 11 13   CR 0.74 0.86 0.63 0.68 0.69   GLC 65* 95 123* 85 105*   CULLEN 8.5 8.8 7.9* 8.2* 8.4*     No results for input(s): MAG, PHOS in the last 26899 hours.  Recent Labs   Lab Test 01/29/19  1007 11/06/18  1002 10/16/18  1036 08/21/18  1012 05/23/18  0959   WBC 4.5 4.2 5.4 4.7 4.9   HGB 11.2* 11.9 10.8* 10.8* 11.7   * 155 148* 160 158   MCV 90 89 91 91 89   NEUTROPHIL 53.4 59.9 68.5 62.5 62.4     Recent Labs   Lab Test 01/29/19  1007 11/06/18  1002 10/16/18  1036   BILITOTAL 0.7 0.8 0.7   ALKPHOS 107 117 130   ALT 29 31 25   AST 41 44 41   ALBUMIN 3.8 4.0 3.8   * 306* 315*     TSH   Date Value Ref Range Status   10/16/2018 2.81 0.40 - 4.00 mU/L Final   10/20/2017 1.90 0.40 - 4.00 mU/L Final   06/19/2017 1.82 0.40 - 4.00 mU/L Final     No results for input(s): CEA in the last 94900 hours.  Results for orders placed or performed in visit on 08/15/18   XR Sacrum and Coccyx 2 Views    Narrative    SACRUM AND COCCYX " TWO VIEWS  8/15/2018 12:27 PM    HISTORY:  Pain in the coccyx.    COMPARISON: May 8, 2017      Impression    IMPRESSION: No definite sacral fracture. No definite coccygeal  fracture or dislocation.    AXEL PRADO MD     Recent Labs   Lab Test 01/29/19  1007 11/06/18  1002 10/16/18  1036 08/21/18  1012 05/23/18  0959  04/14/15  1116   B12 696 834 847 783 802   < > 277   FOLIC  --   --   --   --   --   --  10.7   HGB 11.2* 11.9 10.8* 10.8* 11.7   < >  --    RETICABSCT 80.9 75.0 65.1 75.9 61.8   < >  --    RETP 2.1* 1.9 1.9 2.2* 1.6   < >  --     < > = values in this interval not displayed.     Recent Labs   Lab Test 01/29/19  1007 11/06/18  1002 10/16/18  1036 08/21/18  1012 05/23/18  0959  04/03/17  1132  02/25/16  0903   IGA <7* <7* <7* <7*  --   --  <7*   < >  --     974 889 659* 719   < > 773   < >  --    IGM 91 130 131 98 58*   < > 133   < >  --    ELPM  --   --   --   --   --   --   --   --  0.0    < > = values in this interval not displayed.      ASSESSMENT AND PLAN   1. 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 Common variable immunodeficiency disorder  4. Splenomegaly    Tricia continues to do well. She has no new complains. She continues to have fatigue which is not new for her.     I reviewed all her labs with her. They have been relatively stable since last visit. For the most important one - her Hgb is stable at 11.2 g/dl this time. She continues to have no Ig A, though her Ig G/Ig M are stable with replacement. Her hemoglobin had dropped after I spaced out her infusions to every 6 weeks. Her Hgb levels have been higher since we increased frequency to every 4 weeks.     I will see her in 12 weeks with labs.      Jose Spain ,  Division of Hematology, Oncology & Transplantation  [facility].        Again, thank you for allowing me to participate in the care of your  patient.        Sincerely,        Jose Summers MD

## 2019-02-05 NOTE — PROGRESS NOTES
[facility]  FOLLOW-UP VISIT NOTE    PATIENT NAME: Tricia Sosa MRN # [id number]  DATE OF VISIT: Feb 5, 2019 YOB: 1940    REFERRING PROVIDER: No referring provider defined for this encounter.    DIAGNOSIS: Hemolytic anemia-autoimmune; IgA deficiency    TREATMENT SUMMARY:  Tricia has been diagnosed with IgA deficiency since her around 2000.  She was very sick at the time and had severe diarrhea.  She lost about 40 pounds in weight.  The workup revealed that she had markedly diminished CD4 counts of 48 and was diagnosed with CMV colitis.  Since then she has been followed in hematology clinic at [place] until recently.  She was noted to have anemia which was fairly long-standing.  She was initially referred for anemia in 2004 and also had a bone marrow biopsy done at that time which revealed hypercellular bone marrow with 70-80% cellularity and normal trilineage hematopoiesis and no morphologic features of MDS or lymphoproliferation.  Her anemia was attributed to her chronic underlying disease.  At the next evaluation in 2002 on 4 aggressive anemia there was no evidence of hemolysis, iron deficiency, B12 or folate deficiency.  Bone marrow biopsy was not pursued.  In summer of 2015 she had progressive fatigue, dyspnea on exertion and worsening anemia with a hemoglobin now of 9.  Workup at this time did suggest a hemolytic anemia with elevated LDH at 709, undetectable haptoglobin and elevated unconjugated bilirubin at 3.3.  Monospecific MARIKA was positive for both IgG and complement.  Cold agglutinin screen was positive with very low titer 1:64.  She was started on prednisone 60 mg daily with supplementation of iron folate and B12 starting 7/31/15.  Her prednisone has been slowly tapered and was discontinued off in January 2016.  She was last followed at [facility] on March 10 when she had stable hemoglobin.      SUBJECTIVE   Tricia comes alone for this clinic visit for  "her hemolytic anemia.    Tricia is doing well overall.  She continues to have fatigue. She has no new complains.   She is here with labs done prior to clinic visit.     Wt Readings from Last 10 Encounters:   02/05/19 69.9 kg (154 lb 3.2 oz)   01/08/19 70.1 kg (154 lb 8.7 oz)   12/11/18 70.4 kg (155 lb 3.2 oz)   11/13/18 70.8 kg (156 lb)   10/16/18 70.2 kg (154 lb 12.8 oz)   09/27/18 69.1 kg (152 lb 6.4 oz)   09/19/18 67.8 kg (149 lb 7.6 oz)   09/04/18 67.6 kg (149 lb)   08/15/18 68.2 kg (150 lb 4.8 oz)   07/23/18 68.5 kg (151 lb)       PAST MEDICAL HISTORY   1. Common variable immunodeficiency syndrome  2. 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 Selective IgA deficiency  4. Leukopenia with markedly low CD4 counts on Bactrim prophylaxis  5. History of fall with subdural hematoma in 6/11/14 with complete resolution  6. Hashimoto's disease status post partial thyroidectomy      CURRENT OUTPATIENT MEDICATIONS     Current Outpatient Medications   Medication Sig     cyanocobalamin (VITAMIN  B-12) 1000 MCG tablet      FLUOCINOLONE ACETONIDE SCALP 0.01 % OIL oil      folic acid (FOLVITE) 1 MG tablet      ketoconazole 1 % shampoo      levothyroxine (SYNTHROID/LEVOTHROID) 150 MCG tablet TAKE 1 TABLET BY MOUTH ONCE DAILY     sulfamethoxazole-trimethoprim (BACTRIM DS/SEPTRA DS) 800-160 MG per tablet Take 1 pill orally on Mon, Wed and Friday     No current facility-administered medications for this visit.         ALLERGIES     Allergies   Allergen Reactions     Doxycycline         REVIEW OF SYSTEMS   As above in the HPI, o/w complete 12-point ROS was negative.     PHYSICAL EXAM   /59   Pulse 73   Temp 97.3  F (36.3  C) (Oral)   Resp 16   Ht 1.854 m (6' 1\")   Wt 69.9 kg (154 lb 3.2 oz)   SpO2 99%   BMI 20.34 kg/m      SpO2 Readings from Last 4 Encounters:   11/13/18 99%   10/16/18 99%   09/27/18 98%   09/19/18 100%     Wt Readings from Last 3 Encounters:   02/05/19 69.9 " kg (154 lb 3.2 oz)   01/08/19 70.1 kg (154 lb 8.7 oz)   12/11/18 70.4 kg (155 lb 3.2 oz)     GEN: NAD  HEENT: PERRL, EOMI, no icterus, injection or pallor. Oropharynx is clear.  NECK: no cervical or supraclavicular lymphadenopathy  LUNGS: clear bilaterally  CV: regular, no murmurs, rubs, or gallops  ABDOMEN: soft, non-tender, non-distended, normal bowel sounds, no hepatosplenomegaly by percussion or palpation  EXT: warm, well perfused, no edema  NEURO: alert  SKIN: no rashes     LABORATORY AND IMAGING STUDIES     Recent Labs   Lab Test 01/29/19  1007 11/06/18  1002 10/16/18  1036 08/21/18  1012 05/23/18  0959    140 139 139 140   POTASSIUM 4.0 4.2 3.8 4.1 4.2   CHLORIDE 106 105 106 107 107   CO2 29 26 25 26 26   ANIONGAP 5 9 8 6 7   BUN 16 14 13 11 13   CR 0.74 0.86 0.63 0.68 0.69   GLC 65* 95 123* 85 105*   CULLEN 8.5 8.8 7.9* 8.2* 8.4*     No results for input(s): MAG, PHOS in the last 78095 hours.  Recent Labs   Lab Test 01/29/19  1007 11/06/18  1002 10/16/18  1036 08/21/18  1012 05/23/18  0959   WBC 4.5 4.2 5.4 4.7 4.9   HGB 11.2* 11.9 10.8* 10.8* 11.7   * 155 148* 160 158   MCV 90 89 91 91 89   NEUTROPHIL 53.4 59.9 68.5 62.5 62.4     Recent Labs   Lab Test 01/29/19  1007 11/06/18  1002 10/16/18  1036   BILITOTAL 0.7 0.8 0.7   ALKPHOS 107 117 130   ALT 29 31 25   AST 41 44 41   ALBUMIN 3.8 4.0 3.8   * 306* 315*     TSH   Date Value Ref Range Status   10/16/2018 2.81 0.40 - 4.00 mU/L Final   10/20/2017 1.90 0.40 - 4.00 mU/L Final   06/19/2017 1.82 0.40 - 4.00 mU/L Final     No results for input(s): CEA in the last 73660 hours.  Results for orders placed or performed in visit on 08/15/18   XR Sacrum and Coccyx 2 Views    Narrative    SACRUM AND COCCYX TWO VIEWS  8/15/2018 12:27 PM    HISTORY:  Pain in the coccyx.    COMPARISON: May 8, 2017      Impression    IMPRESSION: No definite sacral fracture. No definite coccygeal  fracture or dislocation.    AXEL PRADO MD     Recent Labs   Lab Test  01/29/19  1007 11/06/18  1002 10/16/18  1036 08/21/18  1012 05/23/18  0959  04/14/15  1116   B12 696 834 847 783 802   < > 277   FOLIC  --   --   --   --   --   --  10.7   HGB 11.2* 11.9 10.8* 10.8* 11.7   < >  --    RETICABSCT 80.9 75.0 65.1 75.9 61.8   < >  --    RETP 2.1* 1.9 1.9 2.2* 1.6   < >  --     < > = values in this interval not displayed.     Recent Labs   Lab Test 01/29/19  1007 11/06/18  1002 10/16/18  1036 08/21/18  1012 05/23/18  0959  04/03/17  1132  02/25/16  0903   IGA <7* <7* <7* <7*  --   --  <7*   < >  --     974 889 659* 719   < > 773   < >  --    IGM 91 130 131 98 58*   < > 133   < >  --    ELPM  --   --   --   --   --   --   --   --  0.0    < > = values in this interval not displayed.      ASSESSMENT AND PLAN   1. 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 Common variable immunodeficiency disorder  4. Splenomegaly    Tricia continues to do well. She has no new complains. She continues to have fatigue which is not new for her.     I reviewed all her labs with her. They have been relatively stable since last visit. For the most important one - her Hgb is stable at 11.2 g/dl this time. She continues to have no Ig A, though her Ig G/Ig M are stable with replacement. Her hemoglobin had dropped after I spaced out her infusions to every 6 weeks. Her Hgb levels have been higher since we increased frequency to every 4 weeks.     I will see her in 12 weeks with labs.      Jose Summers  Adj ,  Division of Hematology, Oncology & Transplantation  [facility].

## 2019-02-05 NOTE — NURSING NOTE
"Oncology Rooming Note    February 5, 2019 9:25 AM   Tricia Sosa is a 78 year old female who presents for:    Chief Complaint   Patient presents with     Oncology Clinic Visit     Other autoimmune hemolytic anemias     Initial Vitals: /59   Pulse 73   Temp 97.3  F (36.3  C) (Oral)   Resp 16   Ht 1.854 m (6' 1\")   Wt 69.9 kg (154 lb 3.2 oz)   SpO2 99%   BMI 20.34 kg/m   Estimated body mass index is 20.34 kg/m  as calculated from the following:    Height as of this encounter: 1.854 m (6' 1\").    Weight as of this encounter: 69.9 kg (154 lb 3.2 oz). Body surface area is 1.9 meters squared.  No Pain (0) Comment: Data Unavailable   No LMP recorded. Patient is postmenopausal.  Allergies reviewed: Yes  Medications reviewed: Yes    Medications: Medication refills not needed today.  Pharmacy name entered into "[employer] [facility]:    [facility] [place] [address] - [address] [place] [id number] [facility]    Clinical concerns: Follow Up     8 minutes for nursing intake (face to face time)     Tricia Marshall CMA            DISCHARGE PLAN:  Next appointments: See patient instruction section  Departure Mode: Ambulatory  Accompanied by: self  0 minutes for nursing discharge (face to face time)   Tricia Marshall CMA      "

## 2019-03-05 ENCOUNTER — INFUSION THERAPY VISIT (OUTPATIENT)
Dept: INFUSION THERAPY | Facility: CLINIC | Age: 79
End: 2019-03-05
Attending: INTERNAL MEDICINE
Payer: MEDICARE

## 2019-03-05 VITALS
DIASTOLIC BLOOD PRESSURE: 80 MMHG | TEMPERATURE: 97 F | HEART RATE: 85 BPM | OXYGEN SATURATION: 98 % | SYSTOLIC BLOOD PRESSURE: 115 MMHG

## 2019-03-05 DIAGNOSIS — D83.9 CVID (COMMON VARIABLE IMMUNODEFICIENCY) (H): Primary | ICD-10-CM

## 2019-03-05 PROCEDURE — 96366 THER/PROPH/DIAG IV INF ADDON: CPT

## 2019-03-05 PROCEDURE — 25000128 H RX IP 250 OP 636: Performed by: INTERNAL MEDICINE

## 2019-03-05 PROCEDURE — 96375 TX/PRO/DX INJ NEW DRUG ADDON: CPT

## 2019-03-05 PROCEDURE — 96365 THER/PROPH/DIAG IV INF INIT: CPT

## 2019-03-05 RX ORDER — DIPHENHYDRAMINE HCL 25 MG
25 CAPSULE ORAL
Status: CANCELLED | OUTPATIENT
Start: 2019-03-19

## 2019-03-05 RX ORDER — ACETAMINOPHEN 325 MG/1
650 TABLET ORAL
Status: CANCELLED
Start: 2019-03-19

## 2019-03-05 RX ADMIN — HUMAN IMMUNOGLOBULIN G 35 G: 20 LIQUID INTRAVENOUS at 11:03

## 2019-03-05 RX ADMIN — HYDROCORTISONE SODIUM SUCCINATE 100 MG: 100 INJECTION, POWDER, FOR SOLUTION INTRAMUSCULAR; INTRAVENOUS at 10:37

## 2019-03-05 NOTE — PROGRESS NOTES
Infusion Nursing Note:  Tricia Sosa presents today for IVIG.    Patient seen by provider today: No   present during visit today: Not Applicable.    Note: N/A.    Intravenous Access:  Peripheral IV placed.    Treatment Conditions:  Biological Infusion Checklist:    ~~~ NOTE: If the patient answers yes to any of the questions below, hold the infusion and contact ordering provider or on-call provider.    1. Have you recently had an elevated temperature, fever, chills, productive cough, coughing for 3 weeks or longer or hemoptysis,  abnormal vital signs, night sweats,  chest pain or have you noticed a decrease in your appetite, unexplained weight loss or fatigue? No  2. Do you have any open wounds or new incisions? No  3. Do you have any recent or upcoming hospitalizations, surgeries or dental procedures? No  4. Do you currently have or recently have had any signs of illness or infection or are you on any antibiotics? No  5. Have you had any new, sudden or worsening abdominal pain? No  6. Have you or anyone in your household received a live vaccination in the past 4 weeks? Please note:  No live vaccines while on biologic/chemotherapy until 6 months after the last treatment.  Patient can receive the flu vaccine (shot only) and the pneumovax.  It is optimal for the patient to get these vaccines mid cycle, but they can be given at any time as long as it is not on the day of the infusion. No  7. Have you recently been diagnosed with any new nervous system diseases (ie. Multiple sclerosis, Guillain [place], seizures, neurological changes) or cancer diagnosis? Are you on any form of radiation or chemotherapy? No  8. Are you pregnant or breast feeding or do you have plans of pregnancy in the future? No  9. Have you been having any signs of worsening depression or suicidal ideations?  (benlysta only) No  10. Have there been any other new onset medical symptoms? No        Post Infusion Assessment:  Patient tolerated  infusion without incident.  Site patent and intact, free from redness, edema or discomfort.  No evidence of extravasations.  Access discontinued per protocol.    Discharge Plan:   Discharge instructions reviewed with: Patient.  AVS to patient via [email or web address].  Patient will return 4/2  for next appointment.   Patient discharged in stable condition accompanied by: self.  Departure Mode: Ambulatory.    Serina Otto RN

## 2019-04-02 ENCOUNTER — INFUSION THERAPY VISIT (OUTPATIENT)
Dept: INFUSION THERAPY | Facility: CLINIC | Age: 79
End: 2019-04-02
Attending: INTERNAL MEDICINE
Payer: MEDICARE

## 2019-04-02 VITALS
SYSTOLIC BLOOD PRESSURE: 121 MMHG | TEMPERATURE: 98.4 F | OXYGEN SATURATION: 97 % | WEIGHT: 152.7 LBS | HEART RATE: 67 BPM | BODY MASS INDEX: 20.15 KG/M2 | RESPIRATION RATE: 16 BRPM | DIASTOLIC BLOOD PRESSURE: 66 MMHG

## 2019-04-02 DIAGNOSIS — D83.9 CVID (COMMON VARIABLE IMMUNODEFICIENCY) (H): Primary | ICD-10-CM

## 2019-04-02 PROCEDURE — 25000128 H RX IP 250 OP 636: Performed by: INTERNAL MEDICINE

## 2019-04-02 PROCEDURE — 96375 TX/PRO/DX INJ NEW DRUG ADDON: CPT

## 2019-04-02 PROCEDURE — 96366 THER/PROPH/DIAG IV INF ADDON: CPT

## 2019-04-02 PROCEDURE — 96365 THER/PROPH/DIAG IV INF INIT: CPT

## 2019-04-02 RX ORDER — ACETAMINOPHEN 325 MG/1
650 TABLET ORAL
Status: CANCELLED
Start: 2019-04-16

## 2019-04-02 RX ORDER — DIPHENHYDRAMINE HCL 25 MG
25 CAPSULE ORAL
Status: CANCELLED | OUTPATIENT
Start: 2019-04-16

## 2019-04-02 RX ADMIN — HYDROCORTISONE SODIUM SUCCINATE 100 MG: 100 INJECTION, POWDER, FOR SOLUTION INTRAMUSCULAR; INTRAVENOUS at 10:31

## 2019-04-02 RX ADMIN — HUMAN IMMUNOGLOBULIN G 35 G: 20 LIQUID INTRAVENOUS at 10:50

## 2019-04-02 ASSESSMENT — PAIN SCALES - GENERAL: PAINLEVEL: NO PAIN (0)

## 2019-04-02 NOTE — PROGRESS NOTES
Infusion Nursing Note:  Tricia Sosa presents today for IVIG.    Patient seen by provider today: No   present during visit today: Not Applicable.    Note: Denies change in assessment.    Intravenous Access:  Peripheral IV placed.    Treatment Conditions:  Biological Infusion Checklist:    ~~~ NOTE: If the patient answers yes to any of the questions below, hold the infusion and contact ordering provider or on-call provider.    1. Have you recently had an elevated temperature, fever, chills, productive cough, coughing for 3 weeks or longer or hemoptysis,  abnormal vital signs, night sweats,  chest pain or have you noticed a decrease in your appetite, unexplained weight loss or fatigue? No  2. Do you have any open wounds or new incisions? No  3. Do you have any recent or upcoming hospitalizations, surgeries or dental procedures? No  4. Do you currently have or recently have had any signs of illness or infection or are you on any antibiotics? No  5. Have you had any new, sudden or worsening abdominal pain? No  6. Have you or anyone in your household received a live vaccination in the past 4 weeks? Please note:  No live vaccines while on biologic/chemotherapy until 6 months after the last treatment.  Patient can receive the flu vaccine (shot only) and the pneumovax.  It is optimal for the patient to get these vaccines mid cycle, but they can be given at any time as long as it is not on the day of the infusion. No  7. Have you recently been diagnosed with any new nervous system diseases (ie. Multiple sclerosis, Guillain [place], seizures, neurological changes) or cancer diagnosis? Are you on any form of radiation or chemotherapy? No  8. Are you pregnant or breast feeding or do you have plans of pregnancy in the future? No  9. Have you been having any signs of worsening depression or suicidal ideations?  (benlysta only) No  10. Have there been any other new onset medical symptoms? No        Post Infusion  Assessment:  Patient tolerated infusion without incident.  Blood return noted pre and post infusion.  Site patent and intact, free from redness, edema or discomfort.  No evidence of extravasations.  Access discontinued per protocol.  Biologic Infusion Post Education: Call the triage nurse at your clinic or seek medical attention if you have chills and/or temperature greater than or equal to 100.5, uncontrolled nausea/vomiting, diarrhea, constipation, dizziness, shortness of breath, chest pain, heart palpitations, weakness or any other new or concerning symptoms, questions or concerns.  You cannot have any live virus vaccines prior to or during treatment or up to 6 months post infusion.  If you have an upcoming surgery, medical procedure or dental procedure during treatment, this should be discussed with your ordering physician and your surgeon/dentist.  If you are having any concerning symptom, if you are unsure if you should get your next infusion or wish to speak to a provider before your next infusion, please call your care coordinator or triage nurse at your clinic to notify them so we can adequately serve you.       Discharge Plan:   Discharge instructions reviewed with: Patient.  Patient discharged in stable condition accompanied by: self.  Departure Mode: Ambulatory.  Next MD and infusion scheduled for 4/30/19.    STACY CHEATHAM RN

## 2019-04-09 DIAGNOSIS — R42 VERTIGO: ICD-10-CM

## 2019-04-09 NOTE — TELEPHONE ENCOUNTER
"meclizine (ANTIVERT) 25 MG tablet      Last Written Prescription Date:  10/20/17  Last Fill Quantity: 30 tablet,   # refills: 1  Last Office Visit: 10/20/2017 (Lissette)  Future Office visit:    Next 5 appointments (look out 90 days)    Apr 30, 2019 10:15 AM CDT  Return Visit with Jose Summers MD  [facility] ([facility]) [facility]  [address]  [address]  [facility] [address]  [phone]           Routing refill request to provider for review/approval because:  Drug not active on patient's medication list    Requested Prescriptions   Pending Prescriptions Disp Refills     meclizine (ANTIVERT) 25 MG tablet [Pharmacy Med Name: Meclizine HCl Oral Tablet 25 MG] 30 tablet 0     Sig: TAKE ONE TABLET BY MOUTH EVERY SIX HOURS AS NEEDED FOR DIZZINESS        Antivertigo/Antiemetic Agents Failed - 4/9/2019  8:09 AM        Failed - Recent (12 mo) or future (30 days) visit within the authorizing provider's specialty     Patient had office visit in the last 12 months or has a visit in the next 30 days with authorizing provider or within the authorizing provider's specialty.  See \"Patient Info\" tab in inbasket, or \"Choose Columns\" in Meds & Orders section of the refill encounter.              Failed - Medication is active on med list        Passed - Patient is 18 years of age or older          "

## 2019-04-10 RX ORDER — MECLIZINE HYDROCHLORIDE 25 MG/1
TABLET ORAL
Start: 2019-04-10

## 2019-04-10 NOTE — TELEPHONE ENCOUNTER
Refill denied with note to pharmacist:   [facility] Staff, will you forward refill request to PCP Monika Whipple?  This patient has not seen Jose since 2017.   Andrew Thomas RN

## 2019-04-23 DIAGNOSIS — D83.9 CVID (COMMON VARIABLE IMMUNODEFICIENCY) (H): ICD-10-CM

## 2019-04-23 DIAGNOSIS — D59.19 OTHER AUTOIMMUNE HEMOLYTIC ANEMIAS: ICD-10-CM

## 2019-04-23 DIAGNOSIS — E53.8 B12 DEFICIENCY: ICD-10-CM

## 2019-04-23 LAB
ALBUMIN SERPL-MCNC: 3.8 G/DL (ref 3.4–5)
ALP SERPL-CCNC: 107 U/L (ref 40–150)
ALT SERPL W P-5'-P-CCNC: 35 U/L (ref 0–50)
ANION GAP SERPL CALCULATED.3IONS-SCNC: 7 MMOL/L (ref 3–14)
AST SERPL W P-5'-P-CCNC: 49 U/L (ref 0–45)
BASOPHILS # BLD AUTO: 0 10E9/L (ref 0–0.2)
BASOPHILS NFR BLD AUTO: 0.3 %
BILIRUB SERPL-MCNC: 0.8 MG/DL (ref 0.2–1.3)
BUN SERPL-MCNC: 14 MG/DL (ref 7–30)
CALCIUM SERPL-MCNC: 8.7 MG/DL (ref 8.5–10.1)
CHLORIDE SERPL-SCNC: 107 MMOL/L (ref 94–109)
CO2 SERPL-SCNC: 27 MMOL/L (ref 20–32)
CREAT SERPL-MCNC: 0.69 MG/DL (ref 0.52–1.04)
DIFFERENTIAL METHOD BLD: ABNORMAL
EOSINOPHIL # BLD AUTO: 0.1 10E9/L (ref 0–0.7)
EOSINOPHIL NFR BLD AUTO: 1.4 %
ERYTHROCYTE [DISTWIDTH] IN BLOOD BY AUTOMATED COUNT: 14.6 % (ref 10–15)
GFR SERPL CREATININE-BSD FRML MDRD: 83 ML/MIN/{1.73_M2}
GLUCOSE SERPL-MCNC: 86 MG/DL (ref 70–99)
HCT VFR BLD AUTO: 33.6 % (ref 35–47)
HGB BLD-MCNC: 11.4 G/DL (ref 11.7–15.7)
LDH SERPL L TO P-CCNC: 346 U/L (ref 81–234)
LYMPHOCYTES # BLD AUTO: 0.9 10E9/L (ref 0.8–5.3)
LYMPHOCYTES NFR BLD AUTO: 24.4 %
MCH RBC QN AUTO: 31.1 PG (ref 26.5–33)
MCHC RBC AUTO-ENTMCNC: 33.9 G/DL (ref 31.5–36.5)
MCV RBC AUTO: 92 FL (ref 78–100)
MONOCYTES # BLD AUTO: 0.6 10E9/L (ref 0–1.3)
MONOCYTES NFR BLD AUTO: 18.3 %
NEUTROPHILS # BLD AUTO: 1.9 10E9/L (ref 1.6–8.3)
NEUTROPHILS NFR BLD AUTO: 55.6 %
PLATELET # BLD AUTO: 120 10E9/L (ref 150–450)
POTASSIUM SERPL-SCNC: 3.9 MMOL/L (ref 3.4–5.3)
PROT SERPL-MCNC: 6.8 G/DL (ref 6.8–8.8)
RBC # BLD AUTO: 3.67 10E12/L (ref 3.8–5.2)
RETICS # AUTO: 102.7 10E9/L (ref 25–95)
RETICS/RBC NFR AUTO: 2.8 % (ref 0.5–2)
SODIUM SERPL-SCNC: 141 MMOL/L (ref 133–144)
VIT B12 SERPL-MCNC: 814 PG/ML (ref 193–986)
WBC # BLD AUTO: 3.5 10E9/L (ref 4–11)

## 2019-04-23 PROCEDURE — 82784 ASSAY IGA/IGD/IGG/IGM EACH: CPT | Performed by: INTERNAL MEDICINE

## 2019-04-23 PROCEDURE — 82607 VITAMIN B-12: CPT | Performed by: INTERNAL MEDICINE

## 2019-04-23 PROCEDURE — 85025 COMPLETE CBC W/AUTO DIFF WBC: CPT | Performed by: INTERNAL MEDICINE

## 2019-04-23 PROCEDURE — 83615 LACTATE (LD) (LDH) ENZYME: CPT | Performed by: INTERNAL MEDICINE

## 2019-04-23 PROCEDURE — 85045 AUTOMATED RETICULOCYTE COUNT: CPT | Performed by: INTERNAL MEDICINE

## 2019-04-23 PROCEDURE — 83010 ASSAY OF HAPTOGLOBIN QUANT: CPT | Performed by: INTERNAL MEDICINE

## 2019-04-23 PROCEDURE — 80053 COMPREHEN METABOLIC PANEL: CPT | Performed by: INTERNAL MEDICINE

## 2019-04-23 PROCEDURE — 36415 COLL VENOUS BLD VENIPUNCTURE: CPT | Performed by: INTERNAL MEDICINE

## 2019-04-24 LAB
HAPTOGLOB SERPL-MCNC: <6 MG/DL (ref 35–175)
IGA SERPL-MCNC: <7 MG/DL (ref 70–380)
IGG SERPL-MCNC: 954 MG/DL (ref 695–1620)
IGM SERPL-MCNC: 79 MG/DL (ref 60–265)

## 2019-04-30 ENCOUNTER — HOSPITAL ENCOUNTER (OUTPATIENT)
Facility: CLINIC | Age: 79
Setting detail: SPECIMEN
End: 2019-04-30
Attending: INTERNAL MEDICINE
Payer: MEDICARE

## 2019-04-30 ENCOUNTER — INFUSION THERAPY VISIT (OUTPATIENT)
Dept: INFUSION THERAPY | Facility: CLINIC | Age: 79
End: 2019-04-30
Attending: INTERNAL MEDICINE
Payer: MEDICARE

## 2019-04-30 ENCOUNTER — ONCOLOGY VISIT (OUTPATIENT)
Dept: ONCOLOGY | Facility: CLINIC | Age: 79
End: 2019-04-30
Attending: INTERNAL MEDICINE
Payer: MEDICARE

## 2019-04-30 VITALS
SYSTOLIC BLOOD PRESSURE: 126 MMHG | TEMPERATURE: 98.1 F | DIASTOLIC BLOOD PRESSURE: 56 MMHG | HEART RATE: 72 BPM | WEIGHT: 150.4 LBS | OXYGEN SATURATION: 99 % | BODY MASS INDEX: 20.37 KG/M2 | HEIGHT: 72 IN | RESPIRATION RATE: 16 BRPM

## 2019-04-30 DIAGNOSIS — E53.8 B12 DEFICIENCY: ICD-10-CM

## 2019-04-30 DIAGNOSIS — D83.9 CVID (COMMON VARIABLE IMMUNODEFICIENCY) (H): Primary | ICD-10-CM

## 2019-04-30 PROCEDURE — 25000128 H RX IP 250 OP 636: Performed by: INTERNAL MEDICINE

## 2019-04-30 PROCEDURE — 96375 TX/PRO/DX INJ NEW DRUG ADDON: CPT

## 2019-04-30 PROCEDURE — 96366 THER/PROPH/DIAG IV INF ADDON: CPT

## 2019-04-30 PROCEDURE — G0463 HOSPITAL OUTPT CLINIC VISIT: HCPCS | Mod: 25

## 2019-04-30 PROCEDURE — 99213 OFFICE O/P EST LOW 20 MIN: CPT | Performed by: INTERNAL MEDICINE

## 2019-04-30 PROCEDURE — 96365 THER/PROPH/DIAG IV INF INIT: CPT

## 2019-04-30 RX ORDER — HEPARIN SODIUM,PORCINE 10 UNIT/ML
5 VIAL (ML) INTRAVENOUS
Status: CANCELLED | OUTPATIENT
Start: 2019-04-30

## 2019-04-30 RX ORDER — DIPHENHYDRAMINE HCL 25 MG
50 CAPSULE ORAL ONCE
Status: CANCELLED | OUTPATIENT
Start: 2019-04-30

## 2019-04-30 RX ORDER — ACETAMINOPHEN 325 MG/1
650 TABLET ORAL ONCE
Status: CANCELLED
Start: 2019-04-30

## 2019-04-30 RX ORDER — HEPARIN SODIUM (PORCINE) LOCK FLUSH IV SOLN 100 UNIT/ML 100 UNIT/ML
5 SOLUTION INTRAVENOUS
Status: CANCELLED | OUTPATIENT
Start: 2019-04-30

## 2019-04-30 RX ADMIN — HYDROCORTISONE SODIUM SUCCINATE 100 MG: 100 INJECTION, POWDER, FOR SOLUTION INTRAMUSCULAR; INTRAVENOUS at 11:45

## 2019-04-30 RX ADMIN — HUMAN IMMUNOGLOBULIN G 35 G: 20 LIQUID INTRAVENOUS at 12:07

## 2019-04-30 ASSESSMENT — MIFFLIN-ST. JEOR: SCORE: 1290.09

## 2019-04-30 ASSESSMENT — PAIN SCALES - GENERAL: PAINLEVEL: NO PAIN (0)

## 2019-04-30 NOTE — PROGRESS NOTES
Infusion Nursing Note:  Maandrew JUNIOR Ian presents today for IVIG.    Patient seen by provider today: Yes: Melina   present during visit today: Not Applicable.    Note: Assessment done by MD at appointment.    Intravenous Access:  Peripheral IV placed.    Treatment Conditions:  Biological Infusion Checklist:  ~~~ NOTE: If the patient answers yes to any of the questions below, hold the infusion and contact ordering provider or on-call provider.    1. Have you recently had an elevated temperature, fever, chills, productive cough, coughing for 3 weeks or longer or hemoptysis, abnormal vital signs, night sweats,  chest pain or have you noticed a decrease in your appetite, unexplained weight loss or fatigue? No  2. Do you have any open wounds or new incisions? No  3. Do you have any recent or upcoming hospitalizations, surgeries or dental procedures? No  4. Do you currently have or recently have had any signs of illness or infection or are you on any antibiotics? No  5. Have you had any new, sudden or worsening abdominal pain? No  6. Have you or anyone in your household received a live vaccination in the past 4 weeks? Please note:  No live vaccines while on biologic/chemotherapy until 6 months after the last treatment.  Patient can receive the flu vaccine (shot only) and the pneumovax.  It is optimal for the patient to get these vaccines mid cycle, but they can be given at any time as long as it is not on the day of the infusion. No  7. Have you recently been diagnosed with any new nervous system diseases (ie. Multiple sclerosis, Guillain [place], seizures, neurological changes) or cancer diagnosis? No  8. Are you on any form of radiation or chemotherapy? No  9. Are you pregnant or breast feeding or do you have plans of pregnancy in the future? No  10. Have you been having any signs of worsening depression or suicidal ideations?  (benlysta only) No  11. Have there been any other new onset medical symptoms?  No        Post Infusion Assessment:  Patient tolerated infusion without incident.  Blood return noted pre and post infusion.  Site patent and intact, free from redness, edema or discomfort.  No evidence of extravasations.  Access discontinued per protocol.  Biologic Infusion Post Education: Call the triage nurse at your clinic or seek medical attention if you have chills and/or temperature greater than or equal to 100.5, uncontrolled nausea/vomiting, diarrhea, constipation, dizziness, shortness of breath, chest pain, heart palpitations, weakness or any other new or concerning symptoms, questions or concerns.  You cannot have any live virus vaccines prior to or during treatment or up to 6 months post infusion.  If you have an upcoming surgery, medical procedure or dental procedure during treatment, this should be discussed with your ordering physician and your surgeon/dentist.  If you are having any concerning symptom, if you are unsure if you should get your next infusion or wish to speak to a provider before your next infusion, please call your care coordinator or triage nurse at your clinic to notify them so we can adequately serve you.       Discharge Plan:   Discharge instructions reviewed with: Patient.  Patient discharged in stable condition accompanied by: self.  Departure Mode: Ambulatory.  Next infusion scheduled for 5/28/19.      STACY CHEATHAM RN

## 2019-04-30 NOTE — NURSING NOTE
"Oncology Rooming Note    April 30, 2019 10:12 AM   Tricia Sosa is a 78 year old female who presents for:    Chief Complaint   Patient presents with     Oncology Clinic Visit     CVID (common variable immunodeficiency)     Initial Vitals: /56   Pulse 72   Temp 98.1  F (36.7  C) (Oral)   Resp 16   Ht 1.854 m (6' 1\")   Wt 68.2 kg (150 lb 6.4 oz)   SpO2 99%   BMI 19.84 kg/m   Estimated body mass index is 19.84 kg/m  as calculated from the following:    Height as of this encounter: 1.854 m (6' 1\").    Weight as of this encounter: 68.2 kg (150 lb 6.4 oz). Body surface area is 1.87 meters squared.  No Pain (0) Comment: Data Unavailable   No LMP recorded. Patient is postmenopausal.  Allergies reviewed: Yes  Medications reviewed: Yes    Medications: Medication refills not needed today.  Pharmacy name entered into [facility]:    [id number] - [place], [place] - [address] [address] [facility]    Clinical concerns: f/u       Barbara Corrigan CMA              "

## 2019-04-30 NOTE — LETTER
4/30/2019         RE: Tricia Sosa  [address] [place] [address]        Dear Colleague,    Thank you for referring your patient, Tricia Sosa, to the [facility]CER [facility]. Please see a copy of my visit note below.    [facility] CANCER CLINIC  FOLLOW-UP VISIT NOTE    PATIENT NAME: Tricia Sosa MRN # [id number]  DATE OF VISIT: Apr 30, 2019 YOB: 1940    REFERRING PROVIDER: No referring provider defined for this encounter.    DIAGNOSIS: Hemolytic anemia-autoimmune; IgA deficiency    TREATMENT SUMMARY:  Tricia has been diagnosed with IgA deficiency since her around 2000.  She was very sick at the time and had severe diarrhea.  She lost about 40 pounds in weight.  The workup revealed that she had markedly diminished CD4 counts of 48 and was diagnosed with CMV colitis.  Since then she has been followed in hematology clinic at [place] until recently.  She was noted to have anemia which was fairly long-standing.  She was initially referred for anemia in 2004 and also had a bone marrow biopsy done at that time which revealed hypercellular bone marrow with 70-80% cellularity and normal trilineage hematopoiesis and no morphologic features of MDS or lymphoproliferation.  Her anemia was attributed to her chronic underlying disease.  At the next evaluation in 2002 on 4 aggressive anemia there was no evidence of hemolysis, iron deficiency, B12 or folate deficiency.  Bone marrow biopsy was not pursued.  In summer of 2015 she had progressive fatigue, dyspnea on exertion and worsening anemia with a hemoglobin now of 9.  Workup at this time did suggest a hemolytic anemia with elevated LDH at 709, undetectable haptoglobin and elevated unconjugated bilirubin at 3.3.  Monospecific MARIKA was positive for both IgG and complement.  Cold agglutinin screen was positive with very low titer 1:64.  She was started on prednisone 60 mg daily with supplementation of iron folate  and B12 starting 7/31/15.  Her prednisone has been slowly tapered and was discontinued off in January 2016.  She was last followed at [facility] on March 10 when she had stable hemoglobin.      SUBJECTIVE   Tricia comes alone for this clinic visit for her hemolytic anemia.    Tricia is doing well overall.  She continues to have fatigue. She has no new complains.   She is here with labs done prior to clinic visit.     Wt Readings from Last 10 Encounters:   04/30/19 68.2 kg (150 lb 6.4 oz)   04/02/19 69.3 kg (152 lb 11.2 oz)   02/05/19 69.9 kg (154 lb 3.2 oz)   01/08/19 70.1 kg (154 lb 8.7 oz)   12/11/18 70.4 kg (155 lb 3.2 oz)   11/13/18 70.8 kg (156 lb)   10/16/18 70.2 kg (154 lb 12.8 oz)   09/27/18 69.1 kg (152 lb 6.4 oz)   09/19/18 67.8 kg (149 lb 7.6 oz)   09/04/18 67.6 kg (149 lb)       PAST MEDICAL HISTORY   1. Common variable immunodeficiency syndrome  2. 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 Selective IgA deficiency  4. Leukopenia with markedly low CD4 counts on Bactrim prophylaxis  5. History of fall with subdural hematoma in 6/11/14 with complete resolution  6. Hashimoto's disease status post partial thyroidectomy      CURRENT OUTPATIENT MEDICATIONS     Current Outpatient Medications   Medication Sig     cyanocobalamin (VITAMIN  B-12) 1000 MCG tablet      FLUOCINOLONE ACETONIDE SCALP 0.01 % OIL oil      folic acid (FOLVITE) 1 MG tablet      ketoconazole 1 % shampoo      levothyroxine (SYNTHROID/LEVOTHROID) 150 MCG tablet TAKE 1 TABLET BY MOUTH ONCE DAILY     sulfamethoxazole-trimethoprim (BACTRIM DS/SEPTRA DS) 800-160 MG per tablet Take 1 pill orally on Mon, Wed and Friday     No current facility-administered medications for this visit.         ALLERGIES     Allergies   Allergen Reactions     Doxycycline         REVIEW OF SYSTEMS   As above in the HPI, o/w complete 12-point ROS was negative.     PHYSICAL EXAM   /56   Pulse 72   Temp 98.1  F (36.7  C)  "(Oral)   Resp 16   Ht 1.854 m (6' 1\")   Wt 68.2 kg (150 lb 6.4 oz)   SpO2 99%   BMI 19.84 kg/m       SpO2 Readings from Last 4 Encounters:   11/13/18 99%   10/16/18 99%   09/27/18 98%   09/19/18 100%     Wt Readings from Last 3 Encounters:   04/30/19 68.2 kg (150 lb 6.4 oz)   04/02/19 69.3 kg (152 lb 11.2 oz)   02/05/19 69.9 kg (154 lb 3.2 oz)     GEN: NAD  HEENT: PERRL, EOMI, no icterus, injection or pallor. Oropharynx is clear.  NECK: no cervical or supraclavicular lymphadenopathy  LUNGS: clear bilaterally  CV: regular, no murmurs, rubs, or gallops  ABDOMEN: soft, non-tender, non-distended, normal bowel sounds, no hepatosplenomegaly by percussion or palpation  EXT: warm, well perfused, no edema  NEURO: alert  SKIN: no rashes     LABORATORY AND IMAGING STUDIES     Recent Labs   Lab Test 04/23/19  0944 01/29/19  1007 11/06/18  1002 10/16/18  1036 08/21/18  1012    140 140 139 139   POTASSIUM 3.9 4.0 4.2 3.8 4.1   CHLORIDE 107 106 105 106 107   CO2 27 29 26 25 26   ANIONGAP 7 5 9 8 6   BUN 14 16 14 13 11   CR 0.69 0.74 0.86 0.63 0.68   GLC 86 65* 95 123* 85   CULLEN 8.7 8.5 8.8 7.9* 8.2*     No results for input(s): MAG, PHOS in the last 70325 hours.  Recent Labs   Lab Test 04/23/19  0944 01/29/19  1007 11/06/18  1002 10/16/18  1036 08/21/18  1012   WBC 3.5* 4.5 4.2 5.4 4.7   HGB 11.4* 11.2* 11.9 10.8* 10.8*   * 145* 155 148* 160   MCV 92 90 89 91 91   NEUTROPHIL 55.6 53.4 59.9 68.5 62.5     Recent Labs   Lab Test 04/23/19  0944 01/29/19  1007 11/06/18  1002   BILITOTAL 0.8 0.7 0.8   ALKPHOS 107 107 117   ALT 35 29 31   AST 49* 41 44   ALBUMIN 3.8 3.8 4.0   * 269* 306*     TSH   Date Value Ref Range Status   10/16/2018 2.81 0.40 - 4.00 mU/L Final   10/20/2017 1.90 0.40 - 4.00 mU/L Final   06/19/2017 1.82 0.40 - 4.00 mU/L Final       Recent Labs   Lab Test 04/23/19  0944 01/29/19  1007 11/06/18  1002 10/16/18  1036 08/21/18  1012  02/25/16  0903   IGA <7* <7* <7* <7* <7*   < >  --     969 " 974 889 659*   < >  --    IGM 79 91 130 131 98   < >  --    ELPM  --   --   --   --   --   --  0.0    < > = values in this interval not displayed.            ASSESSMENT AND PLAN   1. 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 Common variable immunodeficiency disorder  4. Splenomegaly    Tricia continues to do well. She has no new complains. She continues to have fatigue which is not new for her.     I reviewed all her labs with her. They have been relatively stable since last visit. For the most important one - her Hgb is stable at 11.4 g/dl this time. She continues to have no Ig A, though her Ig G/Ig M are stable with replacement. Her hemoglobin had dropped after I spaced out her infusions to every 6 weeks. Her Hgb levels have been higher since we increased frequency to every 4 weeks.     I will see her in 12 weeks with labs.      Jose Summers  Adj ,  Division of Hematology, Oncology & Transplantation  [facility].        Again, thank you for allowing me to participate in the care of your patient.        Sincerely,        Jose Summers MD

## 2019-04-30 NOTE — PROGRESS NOTES
[facility] CANCER CLINIC  FOLLOW-UP VISIT NOTE    PATIENT NAME: Tricia Sosa MRN # [id number]  DATE OF VISIT: Apr 30, 2019 YOB: 1940    REFERRING PROVIDER: No referring provider defined for this encounter.    DIAGNOSIS: Hemolytic anemia-autoimmune; IgA deficiency    TREATMENT SUMMARY:  Tricia has been diagnosed with IgA deficiency since her around 2000.  She was very sick at the time and had severe diarrhea.  She lost about 40 pounds in weight.  The workup revealed that she had markedly diminished CD4 counts of 48 and was diagnosed with CMV colitis.  Since then she has been followed in hematology clinic at [place] until recently.  She was noted to have anemia which was fairly long-standing.  She was initially referred for anemia in 2004 and also had a bone marrow biopsy done at that time which revealed hypercellular bone marrow with 70-80% cellularity and normal trilineage hematopoiesis and no morphologic features of MDS or lymphoproliferation.  Her anemia was attributed to her chronic underlying disease.  At the next evaluation in 2002 on 4 aggressive anemia there was no evidence of hemolysis, iron deficiency, B12 or folate deficiency.  Bone marrow biopsy was not pursued.  In summer of 2015 she had progressive fatigue, dyspnea on exertion and worsening anemia with a hemoglobin now of 9.  Workup at this time did suggest a hemolytic anemia with elevated LDH at 709, undetectable haptoglobin and elevated unconjugated bilirubin at 3.3.  Monospecific MARIKA was positive for both IgG and complement.  Cold agglutinin screen was positive with very low titer 1:64.  She was started on prednisone 60 mg daily with supplementation of iron folate and B12 starting 7/31/15.  Her prednisone has been slowly tapered and was discontinued off in January 2016.  She was last followed at [facility] on March 10 when she had stable hemoglobin.      SUBJECTIVE   Tricia comes alone for this clinic visit for  "her hemolytic anemia.    Tricia is doing well overall.  She continues to have fatigue. She has no new complains.   She is here with labs done prior to clinic visit.     Wt Readings from Last 10 Encounters:   04/30/19 68.2 kg (150 lb 6.4 oz)   04/02/19 69.3 kg (152 lb 11.2 oz)   02/05/19 69.9 kg (154 lb 3.2 oz)   01/08/19 70.1 kg (154 lb 8.7 oz)   12/11/18 70.4 kg (155 lb 3.2 oz)   11/13/18 70.8 kg (156 lb)   10/16/18 70.2 kg (154 lb 12.8 oz)   09/27/18 69.1 kg (152 lb 6.4 oz)   09/19/18 67.8 kg (149 lb 7.6 oz)   09/04/18 67.6 kg (149 lb)       PAST MEDICAL HISTORY   1. Common variable immunodeficiency syndrome  2. 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 Selective IgA deficiency  4. Leukopenia with markedly low CD4 counts on Bactrim prophylaxis  5. History of fall with subdural hematoma in 6/11/14 with complete resolution  6. Hashimoto's disease status post partial thyroidectomy      CURRENT OUTPATIENT MEDICATIONS     Current Outpatient Medications   Medication Sig     cyanocobalamin (VITAMIN  B-12) 1000 MCG tablet      FLUOCINOLONE ACETONIDE SCALP 0.01 % OIL oil      folic acid (FOLVITE) 1 MG tablet      ketoconazole 1 % shampoo      levothyroxine (SYNTHROID/LEVOTHROID) 150 MCG tablet TAKE 1 TABLET BY MOUTH ONCE DAILY     sulfamethoxazole-trimethoprim (BACTRIM DS/SEPTRA DS) 800-160 MG per tablet Take 1 pill orally on Mon, Wed and Friday     No current facility-administered medications for this visit.         ALLERGIES     Allergies   Allergen Reactions     Doxycycline         REVIEW OF SYSTEMS   As above in the HPI, o/w complete 12-point ROS was negative.     PHYSICAL EXAM   /56   Pulse 72   Temp 98.1  F (36.7  C) (Oral)   Resp 16   Ht 1.854 m (6' 1\")   Wt 68.2 kg (150 lb 6.4 oz)   SpO2 99%   BMI 19.84 kg/m      SpO2 Readings from Last 4 Encounters:   11/13/18 99%   10/16/18 99%   09/27/18 98%   09/19/18 100%     Wt Readings from Last 3 Encounters: "   04/30/19 68.2 kg (150 lb 6.4 oz)   04/02/19 69.3 kg (152 lb 11.2 oz)   02/05/19 69.9 kg (154 lb 3.2 oz)     GEN: NAD  HEENT: PERRL, EOMI, no icterus, injection or pallor. Oropharynx is clear.  NECK: no cervical or supraclavicular lymphadenopathy  LUNGS: clear bilaterally  CV: regular, no murmurs, rubs, or gallops  ABDOMEN: soft, non-tender, non-distended, normal bowel sounds, no hepatosplenomegaly by percussion or palpation  EXT: warm, well perfused, no edema  NEURO: alert  SKIN: no rashes     LABORATORY AND IMAGING STUDIES     Recent Labs   Lab Test 04/23/19  0944 01/29/19  1007 11/06/18  1002 10/16/18  1036 08/21/18  1012    140 140 139 139   POTASSIUM 3.9 4.0 4.2 3.8 4.1   CHLORIDE 107 106 105 106 107   CO2 27 29 26 25 26   ANIONGAP 7 5 9 8 6   BUN 14 16 14 13 11   CR 0.69 0.74 0.86 0.63 0.68   GLC 86 65* 95 123* 85   CULLEN 8.7 8.5 8.8 7.9* 8.2*     No results for input(s): MAG, PHOS in the last 73138 hours.  Recent Labs   Lab Test 04/23/19  0944 01/29/19  1007 11/06/18  1002 10/16/18  1036 08/21/18  1012   WBC 3.5* 4.5 4.2 5.4 4.7   HGB 11.4* 11.2* 11.9 10.8* 10.8*   * 145* 155 148* 160   MCV 92 90 89 91 91   NEUTROPHIL 55.6 53.4 59.9 68.5 62.5     Recent Labs   Lab Test 04/23/19  0944 01/29/19  1007 11/06/18  1002   BILITOTAL 0.8 0.7 0.8   ALKPHOS 107 107 117   ALT 35 29 31   AST 49* 41 44   ALBUMIN 3.8 3.8 4.0   * 269* 306*     TSH   Date Value Ref Range Status   10/16/2018 2.81 0.40 - 4.00 mU/L Final   10/20/2017 1.90 0.40 - 4.00 mU/L Final   06/19/2017 1.82 0.40 - 4.00 mU/L Final       Recent Labs   Lab Test 04/23/19  0944 01/29/19  1007 11/06/18  1002 10/16/18  1036 08/21/18  1012  02/25/16  0903   IGA <7* <7* <7* <7* <7*   < >  --     969 974 889 659*   < >  --    IGM 79 91 130 131 98   < >  --    ELPM  --   --   --   --   --   --  0.0    < > = values in this interval not displayed.            ASSESSMENT AND PLAN   1. Warm autoimmune hemolytic anemia(positive MARIKA to IgG and  complement)  2. 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 Common variable immunodeficiency disorder  4. Splenomegaly    Tricia continues to do well. She has no new complains. She continues to have fatigue which is not new for her.     I reviewed all her labs with her. They have been relatively stable since last visit. For the most important one - her Hgb is stable at 11.4 g/dl this time. She continues to have no Ig A, though her Ig G/Ig M are stable with replacement. Her hemoglobin had dropped after I spaced out her infusions to every 6 weeks. Her Hgb levels have been higher since we increased frequency to every 4 weeks.     I will see her in 12 weeks with labs.      Jose Summers  Adj ,  Division of Hematology, Oncology & Transplantation  [facility].

## 2019-05-28 ENCOUNTER — HOSPITAL ENCOUNTER (OUTPATIENT)
Facility: CLINIC | Age: 79
Setting detail: SPECIMEN
End: 2019-05-28
Attending: INTERNAL MEDICINE
Payer: MEDICARE

## 2019-05-28 ENCOUNTER — INFUSION THERAPY VISIT (OUTPATIENT)
Dept: INFUSION THERAPY | Facility: CLINIC | Age: 79
End: 2019-05-28
Attending: INTERNAL MEDICINE
Payer: MEDICARE

## 2019-05-28 VITALS
RESPIRATION RATE: 16 BRPM | SYSTOLIC BLOOD PRESSURE: 125 MMHG | TEMPERATURE: 98.3 F | BODY MASS INDEX: 19.53 KG/M2 | OXYGEN SATURATION: 96 % | HEART RATE: 74 BPM | WEIGHT: 148 LBS | DIASTOLIC BLOOD PRESSURE: 66 MMHG

## 2019-05-28 DIAGNOSIS — D83.9 CVID (COMMON VARIABLE IMMUNODEFICIENCY) (H): Primary | ICD-10-CM

## 2019-05-28 PROCEDURE — A9270 NON-COVERED ITEM OR SERVICE: HCPCS | Performed by: INTERNAL MEDICINE

## 2019-05-28 PROCEDURE — 96366 THER/PROPH/DIAG IV INF ADDON: CPT

## 2019-05-28 PROCEDURE — 25000132 ZZH RX MED GY IP 250 OP 250 PS 637: Performed by: INTERNAL MEDICINE

## 2019-05-28 PROCEDURE — 96365 THER/PROPH/DIAG IV INF INIT: CPT

## 2019-05-28 PROCEDURE — 25000128 H RX IP 250 OP 636: Performed by: INTERNAL MEDICINE

## 2019-05-28 PROCEDURE — 96375 TX/PRO/DX INJ NEW DRUG ADDON: CPT

## 2019-05-28 RX ORDER — HEPARIN SODIUM (PORCINE) LOCK FLUSH IV SOLN 100 UNIT/ML 100 UNIT/ML
5 SOLUTION INTRAVENOUS
Status: CANCELLED | OUTPATIENT
Start: 2019-05-28

## 2019-05-28 RX ORDER — ACETAMINOPHEN 325 MG/1
650 TABLET ORAL ONCE
Status: COMPLETED | OUTPATIENT
Start: 2019-05-28 | End: 2019-05-28

## 2019-05-28 RX ORDER — DIPHENHYDRAMINE HCL 25 MG
50 CAPSULE ORAL ONCE
Status: CANCELLED | OUTPATIENT
Start: 2019-05-28

## 2019-05-28 RX ORDER — HEPARIN SODIUM,PORCINE 10 UNIT/ML
5 VIAL (ML) INTRAVENOUS
Status: CANCELLED | OUTPATIENT
Start: 2019-05-28

## 2019-05-28 RX ORDER — ACETAMINOPHEN 325 MG/1
650 TABLET ORAL ONCE
Status: CANCELLED
Start: 2019-05-28

## 2019-05-28 RX ADMIN — HUMAN IMMUNOGLOBULIN G 35 G: 20 LIQUID INTRAVENOUS at 11:34

## 2019-05-28 RX ADMIN — ACETAMINOPHEN 650 MG: 325 TABLET ORAL at 11:00

## 2019-05-28 RX ADMIN — HYDROCORTISONE SODIUM SUCCINATE 100 MG: 100 INJECTION, POWDER, FOR SOLUTION INTRAMUSCULAR; INTRAVENOUS at 11:00

## 2019-05-28 NOTE — PATIENT INSTRUCTIONS
Biologic Infusion Post Education: Call the triage nurse at your clinic or seek medical attention if you have chills and/or temperature greater than or equal to 100.5, uncontrolled nausea/vomiting, diarrhea, constipation, dizziness, shortness of breath, chest pain, heart palpitations, weakness or any other new or concerning symptoms, questions or concerns.  You cannot have any live virus vaccines prior to or during treatment or up to 6 months post infusion.  If you have an upcoming surgery, medical procedure or dental procedure during treatment, this should be discussed with your ordering physician and your surgeon/dentist.  If you are having any concerning symptom, if you are unsure if you should get your next infusion or wish to speak to a provider before your next infusion, please call your care coordinator or triage nurse at your clinic to notify them so we can adequately serve you.

## 2019-05-28 NOTE — PROGRESS NOTES
Infusion Nursing Note:  Tricia Sosa presents today for IVIG.    Patient seen by provider today: No   present during visit today: Not Applicable.    Note: Patient normally gets just solucortef for a premed but requested tylenol for hip pain.    Started IVIG at 0.5mg/kg/hr and increased by 1mg/kg/hr every 15 minutes for a maximum of 4mg/kg/hr.    Intravenous Access:  Peripheral IV placed.    Treatment Conditions:  Biological Infusion Checklist:  ~~~ NOTE: If the patient answers yes to any of the questions below, hold the infusion and contact ordering provider or on-call provider.    1. Have you recently had an elevated temperature, fever, chills, productive cough, coughing for 3 weeks or longer or hemoptysis, abnormal vital signs, night sweats,  chest pain or have you noticed a decrease in your appetite, unexplained weight loss or fatigue? No  2. Do you have any open wounds or new incisions? No  3. Do you have any recent or upcoming hospitalizations, surgeries or dental procedures? No  4. Do you currently have or recently have had any signs of illness or infection or are you on any antibiotics? No  5. Have you had any new, sudden or worsening abdominal pain? No  6. Have you or anyone in your household received a live vaccination in the past 4 weeks? Please note:  No live vaccines while on biologic/chemotherapy until 6 months after the last treatment.  Patient can receive the flu vaccine (shot only) and the pneumovax.  It is optimal for the patient to get these vaccines mid cycle, but they can be given at any time as long as it is not on the day of the infusion. No  7. Have you recently been diagnosed with any new nervous system diseases (ie. Multiple sclerosis, Guillain [place], seizures, neurological changes) or cancer diagnosis? No  8. Are you on any form of radiation or chemotherapy? No  9. Are you pregnant or breast feeding or do you have plans of pregnancy in the future? No  10. Have you been having  any signs of worsening depression or suicidal ideations?  (benlysta only) No  11. Have there been any other new onset medical symptoms? No        Post Infusion Assessment:  Patient tolerated infusion without incident.  Blood return noted pre and post infusion.  Site patent and intact, free from redness, edema or discomfort.  No evidence of extravasations.  Access discontinued per protocol.    Biologic Infusion Post Education: Call the triage nurse at your clinic or seek medical attention if you have chills and/or temperature greater than or equal to 100.5, uncontrolled nausea/vomiting, diarrhea, constipation, dizziness, shortness of breath, chest pain, heart palpitations, weakness or any other new or concerning symptoms, questions or concerns.  You cannot have any live virus vaccines prior to or during treatment or up to 6 months post infusion.  If you have an upcoming surgery, medical procedure or dental procedure during treatment, this should be discussed with your ordering physician and your surgeon/dentist.  If you are having any concerning symptom, if you are unsure if you should get your next infusion or wish to speak to a provider before your next infusion, please call your care coordinator or triage nurse at your clinic to notify them so we can adequately serve you.       Discharge Plan:   Copy of AVS reviewed with patient and/or family.  Patient will return 6/25/19 for next appointment.  Patient discharged in stable condition accompanied by: self.  Departure Mode: Ambulatory.    lAly Adams RN

## 2019-06-18 ENCOUNTER — OFFICE VISIT (OUTPATIENT)
Dept: PODIATRY | Facility: CLINIC | Age: 79
End: 2019-06-18
Payer: MEDICARE

## 2019-06-18 ENCOUNTER — ANCILLARY PROCEDURE (OUTPATIENT)
Dept: GENERAL RADIOLOGY | Facility: CLINIC | Age: 79
End: 2019-06-18
Attending: PODIATRIST
Payer: MEDICARE

## 2019-06-18 VITALS
SYSTOLIC BLOOD PRESSURE: 120 MMHG | HEIGHT: 72 IN | BODY MASS INDEX: 20.05 KG/M2 | WEIGHT: 148 LBS | DIASTOLIC BLOOD PRESSURE: 70 MMHG

## 2019-06-18 DIAGNOSIS — M20.11 HAV (HALLUX ABDUCTO VALGUS), RIGHT: ICD-10-CM

## 2019-06-18 DIAGNOSIS — M20.42 HAMMERTOE, BILATERAL: ICD-10-CM

## 2019-06-18 DIAGNOSIS — M20.41 HAMMERTOE, BILATERAL: ICD-10-CM

## 2019-06-18 DIAGNOSIS — M79.671 BILATERAL FOOT PAIN: ICD-10-CM

## 2019-06-18 DIAGNOSIS — Q66.70 PES CAVUS: ICD-10-CM

## 2019-06-18 DIAGNOSIS — M79.672 BILATERAL FOOT PAIN: Primary | ICD-10-CM

## 2019-06-18 DIAGNOSIS — L84 CALLUS: ICD-10-CM

## 2019-06-18 DIAGNOSIS — M20.12 HAV (HALLUX ABDUCTO VALGUS), LEFT: ICD-10-CM

## 2019-06-18 DIAGNOSIS — M79.671 BILATERAL FOOT PAIN: Primary | ICD-10-CM

## 2019-06-18 DIAGNOSIS — M79.672 BILATERAL FOOT PAIN: ICD-10-CM

## 2019-06-18 PROCEDURE — 99214 OFFICE O/P EST MOD 30 MIN: CPT | Performed by: PODIATRIST

## 2019-06-18 PROCEDURE — 73630 X-RAY EXAM OF FOOT: CPT | Mod: LT

## 2019-06-18 ASSESSMENT — MIFFLIN-ST. JEOR: SCORE: 1279.2

## 2019-06-18 NOTE — PROGRESS NOTES
PATIENT HISTORY:   Tricia Sosa is a 78 year old female who presents to clinic for assessment of hammertoes. Would like to discuss surgery. Notes that the right foot 3rd and 4th toes have been a little more painful lately. Good last few days but they get hard at the end and sore to walk on. Pain can be 7/10 at its worst. Wondering what can be done for hammertoes.     Review of Systems:  Patient denies fever, chills, rash, wound, stiffness,numbness, weakness, heart burn, blood in stool, chest pain with activity, calf pain when walking, shortness of breath with activity, chronic cough, easy bleeding/bruising, swelling of ankles, excessive thirst, fatigue, depression, anxiety.  Patient admits to limping at times.     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, and posterior vitreous detachment     Urinary tract infection, site not specified     Recurrent UTI's        PAST SURGICAL HISTORY:   Past Surgical History:   Procedure Laterality Date     C LIGATE FALLOPIAN TUBE       C THYROIDECTOMY       COLONOSCOPY N/A 4/26/2016    Procedure: COLONOSCOPY;  Surgeon: Dontae Del Rio MD;  Location:  GI     HC COLONOSCOPY W BIOPSY  4/26/00     HC COLONOSCOPY W BIOPSY  10/8/03     HC COLONOSCOPY W BIOPSY  6/27/05    REPEAT IN 5 YEARS     HC CYSTOSCOPY,INSERT URETERAL STENT      Ureteral stent insertion     HC FLEX SIGMOIDOSCOPY W BIOPSY  5/30/00     HC LAPAROSCOPY, SURGICAL; CHOLECYSTECTOMY  1992     LIGATION OF HEMORRHOID(S)      Hemorrhoidectomy     UPPER GI ENDOSCOPY,BIOPSY  10/01/01        MEDICATIONS:   Current Outpatient Medications:      cyanocobalamin (VITAMIN  B-12) 1000 MCG tablet, , Disp: , Rfl: 3     FLUOCINOLONE ACETONIDE SCALP 0.01 % OIL oil, , Disp: ,  Rfl:      folic acid (FOLVITE) 1 MG tablet, , Disp: , Rfl: 3     ketoconazole 1 % shampoo, , Disp: , Rfl:      levothyroxine (SYNTHROID/LEVOTHROID) 150 MCG tablet, TAKE 1 TABLET BY MOUTH ONCE DAILY, Disp: 90 tablet, Rfl: 2     sulfamethoxazole-trimethoprim (BACTRIM DS/SEPTRA DS) 800-160 MG per tablet, Take 1 pill orally on Mon, Wed and Friday, Disp: 45 tablet, Rfl: 3     ALLERGIES:    Allergies   Allergen Reactions     Doxycycline         SOCIAL HISTORY:   Social History     Socioeconomic History     Marital status:      Spouse name: Not on file     Number of children: Not on file     Years of education: Not on file     Highest education level: Not on file   Occupational History     Not on file   Social Needs     Financial resource strain: Not on file     Food insecurity:     Worry: Not on file     Inability: Not on file     Transportation needs:     Medical: Not on file     Non-medical: Not on file   Tobacco Use     Smoking status: Never Smoker     Smokeless tobacco: Never Used   Substance and Sexual Activity     Alcohol use: No     Alcohol/week: 0.0 oz     Drug use: No     Sexual activity: Never   Lifestyle     Physical activity:     Days per week: Not on file     Minutes per session: Not on file     Stress: Not on file   Relationships     Social connections:     Talks on phone: Not on file     Gets together: Not on file     Attends Sikh service: Not on file     Active member of club or organization: Not on file     Attends meetings of clubs or organizations: Not on file     Relationship status: Not on file     Intimate partner violence:     Fear of current or ex partner: Not on file     Emotionally abused: Not on file     Physically abused: Not on file     Forced sexual activity: Not on file   Other Topics Concern     Parent/sibling w/ CABG, MI or angioplasty before 65F 55M? Not Asked   Social History Narrative     Not on file        FAMILY HISTORY:   Family History   Problem Relation Age of Onset      "Cancer Mother          of colon cancer at age 90     Cerebrovascular Disease Father          at age 85     Dementia Brother      Dementia Brother      Dementia Brother         pancreatic cancer     Colon Cancer No family hx of         EXAM:Vitals: Ht 1.854 m (6' 1\")   Wt 67.1 kg (148 lb)   BMI 19.53 kg/m    BMI= Body mass index is 19.53 kg/m .    General appearance: Patient is alert and fully cooperative with history & exam.  No sign of distress is noted during the visit.     Psychiatric: Affect is pleasant & appropriate.  Patient appears motivated to improve health.     Respiratory: Breathing is regular & unlabored while sitting.     HEENT: Hearing is intact to spoken word.  Speech is clear.  No gross evidence of visual impairment that would impact ambulation.     Dermatologic: localized hyperkeratotic lesion to the distal right 3rd and 4th toes. No open lesions or signs of infection.      Vascular: DP & PT pulses are intact & regular bilaterally.  No significant edema or varicosities noted.  CFT and skin temperature is normal to both lower extremities.     Neurologic: Lower extremity sensation is intact to light touch.  No evidence of weakness or contracture in the lower extremities.  No evidence of neuropathy.     Musculoskeletal: Patient is ambulatory without assistive device or brace. Increase arch height. Lateral deviation of the great toes bilateral with prominent 1st metatarsal heads. Semi rigid contractures of toes 2-5 bilateral.     Radiographs:  I personally reviewed the xrays. Increase in 1st and 2nd inertmetatarsal angle. Elongated 2nd metatarsals. Contracture of proximal interphalangeal joint and distal interphalangeal joints toes 2-4.      ASSESSMENT:    Bilateral foot pain  Hammertoe, bilateral  Hav (hallux abducto valgus), right  Hav (hallux abducto valgus), left  Callus  Pes cavus     PLAN:  Reviewed patient's chart in [facility]. Reviewed and discussed causes of hammertoes with patient.  " Explained that this can be caused by an overpowering of muscles or by the way we walk.  Discussed conservative treatments such as orthotics, pads, shoe gear.  Explained that sometimes the flexor tendons can be cut to try and straighten the toe and reduce rubbing. This is normally done in office and patient is weight bearing in postop she for 1-2 weeks.  We also discussed surgical intervention to remove the joint and possibly fuse the toe.  Normally patient has a pin sticking out of the toe for about 6 weeks and can not get the foot wet. Patient would have to be minimal weight bearing in cam boot.      Would need metatarsal shortening osteotomies 2nd-3rd mets, and proximal interphalangeal joint fusions of toes 2-4.     Reviewed and discussed causes of bunion with patient.  Explained that it is in large part due to a patients foot type and how they walk.   Discussed treatment options with patient including orthotics, splints, pads, and shoe gear.  We discussed that sometimes cortisone injections can help with the pain or physical therapy treatments such as ultrasound to help with pain but does not fix the problem.  Discussed that this is normally a structural issue in the foot and if conservative therapy doesn't work, surgery is considered.  Distal osteotomy versus fusion.  With a distal osteotomy procedure, patient is normally minimally weight bearing in a cam boot for 6 weeks.  With fusion, patient is normally non weight bearing for 6 weeks.  With any surgery, patient needs to take the first 2 weeks off work for icing and elevating and could possible due seated work only for the remaining 4 weeks after that.  We discussed risks of surgery including infection, neuritis, non union, need for further surgery.    Would need bunion corrected with hammertoe correction. Distal osteotomy would be recommended.     Talked about risks including infection, numbness, continued pain, non union, need for further surgery, blood  loss, blood clotting. You will scar. With right foot, could not drive for 6-8 weeks.      she is going to think about surgery. Will call to schedule if she decides.     Mariela Serna DPM, Podiatry/Foot and Ankle Surgery

## 2019-06-18 NOTE — PATIENT INSTRUCTIONS
Thank you for choosing [facility]!    DR. SCHMITZ'S CLINIC SCHEDULE  MONDAY AM - [place] TUESDAY - [place]   [address] [address] [place] [address] [place] [address]   [phone] / [phone] / [phone]       WEDNESDAY - [place] FRIDAY AM - [facility]   [address]   [place] [address] [place] [address]   [phone] / [phone]       FRIDAY PM - [place] SCHEDULE SURGERY: [phone]   [address] BILLING QUESTIONS: [phone]   [place] [address] AFTER HOURS: [phone] / [phone] APPOINTMENTS: [phone]     [employer] (CPL) [phone]       HAMMERTOES  Hammertoe is a contracture (bending) of one or both joints of the second, third, fourth, or fifth (little) toes. This abnormal bending can put pressure on the toe when wearing shoes, causing problems to develop.  Hammertoes usually start out as mild deformities and get progressively worse over time. In the earlier stages, hammertoes are flexible and the symptoms can often be managed with noninvasive measures. But if left untreated, hammertoes can become more rigid and will not respond to non-surgical treatment.  Because of the progressive nature of hammertoes, they should receive early attention. Hammertoes never get better without some kind of intervention.  CAUSES  The most common cause of hammertoe is a muscle/tendon imbalance. This imbalance, which leads to a bending of the toe, results from mechanical (structural) changes in the foot that occur over time in some people.  Hammertoes may be aggravated by shoes that don t fit properly. A hammertoe may result if a toe is too long and is forced into a cramped position when a tight shoe is worn.  Occasionally, hammertoe is the result of an earlier trauma to the toe. In some people, hammertoes are inherited.  SYMPTOMS  Pain or  irritation of the affected toe when wearing shoes.   Corns and calluses (a buildup of skin) on the toe, between two toes, or on the ball of the foot. Corns are caused by constant friction against the shoe. They may be soft or hard, depending upon their location.   Inflammation, redness, or a burning sensation   Contracture of the toe   In more severe cases of hammertoe, open sores may form.   DIAGNOSIS  Although hammertoes are readily apparent, to arrive at a diagnosis the foot and ankle surgeon will obtain a thorough history of your symptoms and examine your foot. During the physical examination, the doctor may attempt to reproduce your symptoms by manipulating your foot and will study the contractures of the toes. In addition, the foot and ankle surgeon may take x-rays to determine the degree of the deformities and assess any changes that may have occurred.   Hammertoes are progressive - they don t go away by themselves and usually they will get worse over time. However, not all cases are alike - some hammertoes progress more rapidly than others. Once your foot and ankle surgeon has evaluated your hammertoes, a treatment plan can be developed that is suited to your needs.  NON-SURGICAL TREATMENT  There is a variety of treatment options for hammertoe. The treatment your foot and ankle surgeon selects will depend upon the severity of your hammertoe and other factors.  A number of non-surgical measures can be undertaken:  Padding corns and calluses. Your foot and ankle surgeon can provide or prescribe pads designed to shield corns from irritation. If you want to try over-the-counter pads, avoid the medicated types. Medicated pads are generally not recommended because they may contain a small amount of acid that can be harmful. Consult your surgeon about this option.   Changes in shoewear. Avoid shoes with pointed toes, shoes that are too short, or shoes with high heels - conditions that can force your toe against the  front of the shoe. Instead, choose comfortable shoes with a deep, roomy toe box and heels no higher than two inches.   Orthotic devices. A custom orthotic device placed in your shoe may help control the muscle/tendon imbalance.   Injection therapy. Corticosteroid injections are sometimes used to ease pain and inflammation caused by hammertoe.   Medications. Oral nonsteroidal anti-inflammatory drugs (NSAIDs), such as ibuprofen, may be recommended to reduce pain and inflammation.   Splinting/strapping. Splints or small straps may be applied by the surgeon to realign the bent toe.   Exercises:   1. The Toe Tap  Stand flat on the ground in your bare feet. Raise all of your toes up off the ground as high as you can. Then starting with the little toes, slowly press them down to the ground. After the big toes are on the ground, start over by raising all of them up off the ground again. This motion is similar to tapping your fingers on a desk. Repeat this ten times.     2. Interlocking your Fingers and Toes  Cross your right foot over your knee and place the fingers of your left hand between your toes. Squeeze your toes together, pinching your fingers between them. Spread the toes apart and squeeze them together again. Repeat this ten times then do the other foot. Like most exercises, this will get easier the more you do it. If you are having a lot of difficulty with this exercise, start with just your index finger between your first and second toe, then later add your middle finger between your second and third toes, and so on until you can fit all your fingers between your toes. Do this ten times on each foot. Eventually you will be able to spread your toes apart without using your fingers.    3. Gripping the Floor   the floor by pressing the pads of your toes (not the tips) into the floor without curling your toes. Relax and repeat this ten times. If your shoes have the proper amount of depth, you should be able to  do this with shoes on.    HAMMERTOE TOE SURGERY   Hammertoe surgery is complex. The surgical procedure is an attempt to help the toe lay in a better position. Nearly every structure in the toe will be cut including the tendons, ligaments, skin and bone. Hammertoes are a complex deformity and final toe position is difficult to predict. The only sure way to position a toe is to fuse all three digital joints. That will not happen as some degree of toe motion is needed for walking. The toe may not be completely reduced as the surrounding skin and other structures may not allow the toe to return to a normal position. The tendons on adjacent toes may need to be cut at the time of the original or subsequent surgeries, as interconnections exist between the toes. The toe may drift after surgery. Stiffness may develop leading to new areas of pressure.   Future shoe choices will be critical in allowing the surgery to provide comfort. The toes will still hurt if shoes rub. The original pain may also persist as other foot problems may be contributing to the current pain. The toe may or may not be pinned in place. External pins would require complete avoidance of water on the foot for six weeks. The pin would be removed about six weeks after the surgery. Strict attention to protection is critical. The pin could get bumped or loosen resulting in early removal. Removal might be necessary before the bone heals which would negatively affect the final surgical outcome and toe allignment.         BUNION (HALLUX ABDUCTO VALGUS)  A bunion is caused by muscle imbalance. The great toe is pulled toward the smaller toes. The metatarsal head is pushed outward creating a lump on the side of your foot. Imbalance is the result of foot structure and instability.   Bunions do not improve with time. They usually enlarge, however this is a fairly slow process. Shoes do not necessarily cause bunions, however, they can hasten development and  definitely cause bunions to hurt.   Bunions often run in families. We inherit a certain foot structure, which may be predisposed to bunion development.   Bunion pain is likely a combination of shoes rubbing on the bump, nerve irritation, compression between the toes, joint misalignment, arthritis and altered gait.   SYMPTOMS   Bunions are usually termed mild, moderate or severe. Just because you have a bunion does not mean you have to have pain. There are some people with very severe bunions and no pain and people with mild bunions and a lot of pain.   - Pain on the inside of your foot at the big toe joint (1st MTPJ)   - Swelling on the inside of your foot at the big toe joint   - Redness on the inside of your foot at the big toe joint   - Numbness or burning in the big toe (hallux)   - Decreased motion at the big toe joint   - Painful bursa (fluid-filled sac) on the inside of your foot at the big toe joint   - Pain while wearing shoes -especially shoes too narrow or with high heels    - Pain during activities   - Corn in between the big toe and second toe   - Callous formation on the side or bottom of the big toe or big toe joint   - Callous under the second toe joint (2nd MTPJ)   - Pain in the second toe joint   TREATMENT  Conservative (non-surgical) treatment will not make the bunion go away, but it will hopefully decrease the signs and symptoms you have and help you get rid of the pain and get you back to your activities.   1.  Wider shoes or extra depth shoes: Most bunion pain can be improved simply by wearing compatible shoes. People with bunions cannot choose footwear simply because they like the style. Your bunion should determine which shoes are to be worn. Wide shoes with nonirritating seams,soft leather and a square toe box are most compatible with a bunion. Shoes should fit appropriately right out of the box but may need to be professionally stretched and modified to accommodate the bump. Heels, dress  shoes and shoes with pointed toes will not be comfortable.   2. NSAIDs   3.  Arch supports, custom inserts, padding, splints, toe spacers : Most bunion pain can be improved simply by wearing compatible shoes. People with bunions cannot choose footwear simply because they like the style. Your bunion should determine which shoes are to be worn. Wide shoes with nonirritating seams,soft leather and a square toe box are most compatible with a bunion. Shoes should fit appropriately right out of the box but may need to be professionally stretched and modified to accommodate the bump. Heels, dress shoes and shoes with pointed toes will not be comfortable.   4.  Change activities   5.  Physical therapy  SURGERY  Surgical treatment for bunions is sometimes needed. If you are limited by pain, cannot fit in shoes comfortably and are not able to do your daily activities then surgery may be a good option for you. There are many different surgical procedures to repair bunions. Your foot and ankle surgeon will review your foot exam findings, your x-rays, your age, your health, your lifestyle, your physical activity level and discuss with you which procedure he or she would recommend. Surgical procedures for bunions range from soft tissue repair to cutting and realigning the bones. It is not recommended that you have bunion surgery for cosmetic reasons (you do not like how your foot looks) or because you want to fit in a certain pair of shoes; There is the risk that even after surgery, the bunion will reoccur 9-10% of the time.   Bunion surgery involves cutting and repositioning the bones surrounding the bunion. Pins and screws are used to hold the bones in place during the healing process. The goal of bunion surgery is to reduce the size of the bunion bump. Realignment of the toe and joint is attempted.     Some first toes cannot be forced back into normal alignment even with surgery. Surgery is helpful in most cases but does not  necessarily create a normal foot.   Healing after surgery requires about six weeks of protection. This allows the bone to heal. Maximum recovery takes about one year. The scar tissue and jOint structures require this amount of time to finish the healing process. Expect stiffness, swelling and numbness during that time frame. Bunion surgery does involve side effects. Some side effects are predictable and others are less common but do occur. A scar will be visible and could be irritated by shoes. The shoe may rub on the screw or internal pin requiring surgical removal of these fixation devices. The screw and pin would likely be left in place for a full year. The first toe may loose motion after bunion surgery. The amount of stiffness is variable. Some people never regain normal motion of the first toe. This is due to scar tissue inherent to any surgery. The first toe may drift toward the second toe or away from the second toe. Spreading of the first and second toes is a rare occurrence after bunion surgery. This can be quite bothersome and would need to be surgically repaired. Toe drift toward the second toe could result in a recurrent bunion and revision surgery. Joint fusion is one option to correct an unstable, drifting toe. This procedure straightens the toe, however, no motion remains. Fusion may be necessary to correct complications of bunion surgery or as the original procedure in severe cases.   All surgical procedures involve risk of infection, numbness, pain, delayed healing, osteotomy dislocation, blood clots, continued foot pain, etc. Bunion surgery is quite complex and should not be taken lightly.   Any skin incision can lead to infection. Deep infection might involve the bone and thus repeat surgery and six weeks of IV antibiotics. Scar tissue can cause nerve pain or numbness. This is generally temporary but can be permanent. We do not have treatments that cure nerve problems. Second toe pain could be  related to altered mechanics and pressure transferred to the second toe. Most feet with bunions have pre-existing second toe problems. Delayed bone healing would lengthen the healing time. Some bones simply do not heal. This requires repeat surgery, electronic bone stimulation and/or extended protection. Smokers have an approximate 20% chance of poor bone healing. This is double that of a non-smoker. The bone cut may displace. This may need to be repaired with a second operation. Displacement can cause jOint malalignment. Immobility after surgery can cause blood clots in the legs and lungs. This could result in death.   Foot pain is complex. Most feet hurt for more than one reason. Fixing the bunion would not necessarily create a pain free foot. Appropriate shoes, healthy body weight, avoidance of bare foot walking and moderation of activity will always be necessary to enjoy foot comfort. Your bunion may involve arthritis, which is incurable even with surgery. Long standing bunions often involve chronic irritation to the surrounding nerves. Nerve pain may not resolve even with reducing the bunion bump since permanent nerve damage may be present   Bunion surgery is nevertheless quite successful. Most surgical patients are pleased with their foot following bunion surgery. Many of the issues described above can be controlled by taking proper care of your foot during the healing process.   Your surgeon would be happy to fully describe any of the above issues. You should pursue a full understanding of the operation,recovery process and any potential problems that could develop.   PREVENTION  1.  Do not wear high heels if there is a family history of bunions.  2.  Wear shoes that have enough width and depth in the toe box  Here are exercises that may benefit people with bunions:   Toe stretches - Stretching out your toes can help keep them limber and offset foot pain. To stretch your toes, point your toes straight ahead  for 5 seconds and then curl them under for 5 seconds. Repeat these stretches 10 times. These exercises can be especially beneficial if you also have hammertoes, or chronically bent toes, in addition to a bunion.   Toe flexing and guera - Press your toes against a hard surface such as a wall, to flex and stretch them; hold the position for 10 seconds and repeat three to four times. Then flex your toes in the opposite direction; hold the position for 10 seconds and repeat three to four times.   Stretching your big toe - Using your fingers to gently pull your big toe over into proper alignment can be helpful as well. Hold your toe in position for 10 seconds and repeat three to four times.   Resistance exercises - Wrap either a towel or belt around your big toe and use it to pull your big toe toward you while simultaneously pushing forward, against the towel, with your big toe.   Ball roll - To massage the bottom of your foot, sit down, place a golf ball on the floor under your foot, and roll it around under your foot for two minutes. This can help relieve foot strain and cramping.   Towel curls - You can strengthen your toes by spreading out a small towel on the floor, curling your toes around it, and pulling it toward you. Repeat five times. Gripping objects with your toes like this can help keep your foot flexible.   Picking up marbles - Another gripping exercise you can perform to keep your foot flexible is picking up marbles with your toes. Do this by placing 20 marbles on the floor in front of you and use your foot to pick the marbles up one by one and place them in a bowl.   Walking along the beach - Whenever possible, spend time walking on sand. This can give you a gentle foot massage and also help strengthen your toes. This is especially beneficial for people who have arthritis associated with their bunions.       POTENTIAL COMPLICATIONS OF FOOT & ANKLE SURGERY  Undergoing a surgical procedure involves a  certain amount of risk. Risks of complications vary depending on the complexity of the surgery and how you take care of the surgical area during the healing process. Complications can range from minor infection to death. Some complications are temporary while others will be permanent.  Your surgeon weighs the risk of complications vs the potential benefit of undergoing surgery. You need to consider your tolerance for unexpected problems as you decide whether to undergo surgery.    Foot and Ankle surgery involves cutting skin, bone, ligaments, blood vessels and joints.  These structures heal well but not without consequence. Any break to the skin can lead to infection. A deep infection involves bones or joints which can be devastating. Deep infection can lead to amputation or could spread to other parts of your body. Most infections are minor and easily treated with oral antibiotics. Infections are often times from bacteria already present on your skin. Proper care of the surgical site is an essential component of avoiding infection. Do not get the bandage wet and take proper care of external pins to avoid these problems.     Joint stiffness is inherent to any foot or ankle surgery. Joint surgery is a major component of reconstructive foot and ankle procedures. The ligaments and tissues around the joint are cut, and later repaired. Scare tissue limits joint mobility. This can be permanent but generally improves over the course of one year.    Surgery involves dissection around nerves. Visible nerves are moved out of the way while very small nerves are cut. Scar tissue develops around nerves and can lead to nerve pain, numbness, or neuromas. Nerve symptoms can be permanent. This can lead to numbness or sometimes hypersensitivity to touch and problems wearing shoes.    Bones do not always heal after surgery. Poor healing after a bone cut or joint fusion can lead to an extended period of casting or repeat surgery.  Electronic bone stimulators are sometimes used to stimulate poor healing of bone. Nonunion is when joint fusion does not take.  This can occur as often as 10% of the time. Smoking doubles your risk of poor bone healing to 20%.    Bone grafting is sometimes necessary during the original or subsequent surgery. Bone is sometimes taken from other parts of your body or freeze dried bone from a bone bank from a bone bank or synthetic bone material might be used.    A scar is always present after foot and ankle surgery. The scar will be visible and could be sensitive. Some people develop excessive scarring, which cannot be controlled by the surgeon. Scars can be unsightly and can restrict joint mobility.    Blood clots can develop in the calf after surgery. Foot and ankle surgery is a predisposing factor for blood clots. The blood clot could break and travel to your lung.  This condition can lead to death. Early warning signs could include calf swelling and pain, chest pain or shortness of breath. This is an emergency that requires immediate attention by a medical doctor!    Surgery will not necessarily create a pain-free foot. Even normal feet hurt. Crooked toes, bunions, neuromas, flat feet and arthritis should all be considered permanent conditions.  Ankle pain commonly requires multiple surgeries over a lifetime. Do not assume that having surgery will permanently fix your condition. You may need permanent alteration in shoes and activities to accommodate your foot and ankle problem.    Careful attention to post-operative recommendations will dramatically reduce your risk of complications. Proper dressing, wound care, elevation and rest will be essential to get the wound healed and minimize scarring. Strict attention to activity restrictions, such as non-weight bearing, or partial weight bearing is essential. Internal fixation devices may not resist the stress of walking. Some select surgeries allow the patient to walk,  however this should be very minimal.    Despite these concerns, foot and ankle surgery leads to a high level of patient satisfaction. Your surgeon would not recommend surgery if he/she did not expect your foot to improve. Talk to your surgeon about any of the above issues.      HAMMERTOE PADS / TOE SPLINTS        BODY WEIGHT AND YOUR FEET  The following information is included in the after visit summary for all patients. Body weight can be a sensitive issue to discuss in clinic, but we think the following information is very important. Although we focus on the feet and ankles, we do support the overall health of our patients.     Many things can cause foot and ankle problems. Foot structure, activity level, foot mechanics and injuries are common causes of pain. One very important issue that often goes unmentioned, is body weight. Extra weight can cause increased stress on muscles, ligaments, bones and tendons. Sometimes just a few extra pounds is all it takes to put one over her/his threshold. Without reducing that stress, it can be difficult to alleviate pain. As Foot & Ankle specialists, our job is addressing the lower extremity problem and possible causes. Regarding extra body weight, we encourage patients to discuss diet and weight management plans with their primary care doctors. It is this team approach that gives you the best opportunity for pain relief and getting you back on your feet.      [facility] has a Comprehensive Weight Management Program. This program includes counseling, education, non-surgical and surgical approaches to weight loss. If you are interested in learning more either talk to you primary care provider or call [phone].

## 2019-06-25 ENCOUNTER — INFUSION THERAPY VISIT (OUTPATIENT)
Dept: INFUSION THERAPY | Facility: CLINIC | Age: 79
End: 2019-06-25
Attending: INTERNAL MEDICINE
Payer: MEDICARE

## 2019-06-25 ENCOUNTER — HOSPITAL ENCOUNTER (OUTPATIENT)
Facility: CLINIC | Age: 79
Setting detail: SPECIMEN
End: 2019-06-25
Attending: INTERNAL MEDICINE
Payer: MEDICARE

## 2019-06-25 VITALS
OXYGEN SATURATION: 97 % | SYSTOLIC BLOOD PRESSURE: 131 MMHG | TEMPERATURE: 97.8 F | HEART RATE: 71 BPM | DIASTOLIC BLOOD PRESSURE: 68 MMHG

## 2019-06-25 DIAGNOSIS — D83.9 CVID (COMMON VARIABLE IMMUNODEFICIENCY) (H): Primary | ICD-10-CM

## 2019-06-25 PROCEDURE — 25000128 H RX IP 250 OP 636: Performed by: INTERNAL MEDICINE

## 2019-06-25 PROCEDURE — 96375 TX/PRO/DX INJ NEW DRUG ADDON: CPT

## 2019-06-25 PROCEDURE — 96365 THER/PROPH/DIAG IV INF INIT: CPT

## 2019-06-25 PROCEDURE — 96366 THER/PROPH/DIAG IV INF ADDON: CPT

## 2019-06-25 RX ORDER — ACETAMINOPHEN 325 MG/1
650 TABLET ORAL ONCE
Status: CANCELLED
Start: 2019-06-25

## 2019-06-25 RX ORDER — HEPARIN SODIUM (PORCINE) LOCK FLUSH IV SOLN 100 UNIT/ML 100 UNIT/ML
5 SOLUTION INTRAVENOUS
Status: CANCELLED | OUTPATIENT
Start: 2019-06-25

## 2019-06-25 RX ORDER — DIPHENHYDRAMINE HCL 25 MG
50 CAPSULE ORAL ONCE
Status: CANCELLED | OUTPATIENT
Start: 2019-06-25

## 2019-06-25 RX ORDER — HEPARIN SODIUM,PORCINE 10 UNIT/ML
5 VIAL (ML) INTRAVENOUS
Status: CANCELLED | OUTPATIENT
Start: 2019-06-25

## 2019-06-25 RX ADMIN — HUMAN IMMUNOGLOBULIN G 35 G: 20 LIQUID INTRAVENOUS at 10:00

## 2019-06-25 RX ADMIN — HYDROCORTISONE SODIUM SUCCINATE 100 MG: 100 INJECTION, POWDER, FOR SOLUTION INTRAMUSCULAR; INTRAVENOUS at 09:53

## 2019-06-25 NOTE — PROGRESS NOTES
Infusion Nursing Note:  Tricia Sosa presents today for IVIG and SoluCortef  Patient seen by provider today: No   present during visit today: Not Applicable.    Note: IVIG initiated at 0.5mg/kg/hr.  Increased by 1mg/kg/hr every 15 minutes to max rate of 4mg/kg/hr    Intravenous Access:  Peripheral IV placed.    Treatment Conditions:  Biological Infusion Checklist:  ~~~ NOTE: If the patient answers yes to any of the questions below, hold the infusion and contact ordering provider or on-call provider.    1. Have you recently had an elevated temperature, fever, chills, productive cough, coughing for 3 weeks or longer or hemoptysis, abnormal vital signs, night sweats,  chest pain or have you noticed a decrease in your appetite, unexplained weight loss or fatigue? No  2. Do you have any open wounds or new incisions? No  3. Do you have any recent or upcoming hospitalizations, surgeries or dental procedures? No  4. Do you currently have or recently have had any signs of illness or infection or are you on any antibiotics? No  5. Have you had any new, sudden or worsening abdominal pain? No  6. Have you or anyone in your household received a live vaccination in the past 4 weeks? Please note:  No live vaccines while on biologic/chemotherapy until 6 months after the last treatment.  Patient can receive the flu vaccine (shot only) and the pneumovax.  It is optimal for the patient to get these vaccines mid cycle, but they can be given at any time as long as it is not on the day of the infusion. No  7. Have you recently been diagnosed with any new nervous system diseases (ie. Multiple sclerosis, Guillain Dearborn, seizures, neurological changes) or cancer diagnosis? No  8. Are you on any form of radiation or chemotherapy? No  9. Are you pregnant or breast feeding or do you have plans of pregnancy in the future? No  10. Have you been having any signs of worsening depression or suicidal ideations?  (benlysta only)  No  11. Have there been any other new onset medical symptoms? No        Post Infusion Assessment:  Patient tolerated infusion without incident.  Blood return noted pre and post infusion.  Site patent and intact, free from redness, edema or discomfort.  No evidence of extravasations.  Access discontinued per protocol.  Biologic Infusion Post Education: Call the triage nurse at your clinic or seek medical attention if you have chills and/or temperature greater than or equal to 100.5, uncontrolled nausea/vomiting, diarrhea, constipation, dizziness, shortness of breath, chest pain, heart palpitations, weakness or any other new or concerning symptoms, questions or concerns.  You cannot have any live virus vaccines prior to or during treatment or up to 6 months post infusion.  If you have an upcoming surgery, medical procedure or dental procedure during treatment, this should be discussed with your ordering physician and your surgeon/dentist.  If you are having any concerning symptom, if you are unsure if you should get your next infusion or wish to speak to a provider before your next infusion, please call your care coordinator or triage nurse at your clinic to notify them so we can adequately serve you.       Discharge Plan:   Discharge instructions reviewed with: Patient.  Patient and/or family verbalized understanding of discharge instructions and all questions answered.  AVS to patient via [employer].  Patient will return 7/23/19 for RC and IVIG for next appointment.   Patient discharged in stable condition accompanied by: self.  Departure Mode: Ambulatory.    Estrellita Davis RN

## 2019-07-10 ENCOUNTER — OFFICE VISIT (OUTPATIENT)
Dept: FAMILY MEDICINE | Facility: CLINIC | Age: 79
End: 2019-07-10
Payer: MEDICARE

## 2019-07-10 VITALS
RESPIRATION RATE: 16 BRPM | BODY MASS INDEX: 19.13 KG/M2 | HEART RATE: 75 BPM | DIASTOLIC BLOOD PRESSURE: 62 MMHG | TEMPERATURE: 98.5 F | OXYGEN SATURATION: 99 % | WEIGHT: 145 LBS | SYSTOLIC BLOOD PRESSURE: 132 MMHG

## 2019-07-10 DIAGNOSIS — Z13.820 SCREENING FOR OSTEOPOROSIS: ICD-10-CM

## 2019-07-10 DIAGNOSIS — Z12.31 ENCOUNTER FOR SCREENING MAMMOGRAM FOR MALIGNANT NEOPLASM OF BREAST: ICD-10-CM

## 2019-07-10 DIAGNOSIS — Z78.0 ASYMPTOMATIC MENOPAUSAL STATE: ICD-10-CM

## 2019-07-10 DIAGNOSIS — R29.898 WEAKNESS OF BACK: ICD-10-CM

## 2019-07-10 DIAGNOSIS — Z00.00 MEDICARE ANNUAL WELLNESS VISIT, SUBSEQUENT: Primary | ICD-10-CM

## 2019-07-10 DIAGNOSIS — Z13.6 CARDIOVASCULAR SCREENING; LDL GOAL LESS THAN 160: ICD-10-CM

## 2019-07-10 PROCEDURE — G0439 PPPS, SUBSEQ VISIT: HCPCS | Performed by: NURSE PRACTITIONER

## 2019-07-10 SDOH — HEALTH STABILITY: MENTAL HEALTH: HOW OFTEN DO YOU HAVE A DRINK CONTAINING ALCOHOL?: NEVER

## 2019-07-10 ASSESSMENT — ENCOUNTER SYMPTOMS
JOINT SWELLING: 0
DIARRHEA: 0
PALPITATIONS: 0
DIZZINESS: 0
HEADACHES: 0
HEARTBURN: 0
ARTHRALGIAS: 1
HEMATURIA: 0
CHILLS: 0
SORE THROAT: 0
HEMATOCHEZIA: 0
FREQUENCY: 0
NAUSEA: 0
CONSTIPATION: 0
MYALGIAS: 0
COUGH: 0
PARESTHESIAS: 0
DYSURIA: 0
EYE PAIN: 0
FEVER: 0
SHORTNESS OF BREATH: 1
WEAKNESS: 1
BREAST MASS: 0
NERVOUS/ANXIOUS: 0
ABDOMINAL PAIN: 0

## 2019-07-10 ASSESSMENT — ACTIVITIES OF DAILY LIVING (ADL): CURRENT_FUNCTION: NO ASSISTANCE NEEDED

## 2019-07-10 NOTE — PROGRESS NOTES
She is at risk for lack of exercise and has been provided with information to increase physical activity for the benefit of her well-being.  The patient was provided with written information regarding signs of hearing loss.

## 2019-07-10 NOTE — PATIENT INSTRUCTIONS
Patient Education   Personalized Prevention Plan  You are due for the preventive services outlined below.  Your care team is available to assist you in scheduling these services.  If you have already completed any of these items, please share that information with your care team to update in your medical record.  Health Maintenance Due   Topic Date Due     Discuss Advance Directive Planning  1940     Zoster (Shingles) Vaccine (2 of 3) 02/26/2007     Annual Wellness Visit  10/09/2018     PHQ-2  01/01/2019     Cholesterol Lab  06/11/2019       Exercise for a Healthier Heart     Exercise with a friend. When activity is fun, you're more likely to stick with it.   You may wonder how you can improve the health of your heart. If you re thinking about exercise, you re on the right track. You don t need to become an athlete, but you do need a certain amount of brisk exercise to help strengthen your heart. If you have been diagnosed with a heart condition, your doctor may recommend exercise to help stabilize your condition. To help make exercise a habit, choose safe, fun activities.  Be sure to check with your healthcare provider before starting an exercise program.   Why exercise?  Exercising regularly offers many healthy rewards. It can help you do all of the following:    Improve your blood cholesterol level to help prevent further heart trouble    Lower your blood pressure to help prevent a stroke or heart attack    Control diabetes, or reduce your risk of getting this disease    Improve your heart and lung function    Reach and maintain a healthy weight    Make your muscles stronger and more limber so you can stay active    Prevent falls and fractures by slowing the loss of bone mass (osteoporosis)    Manage stress better    Reduce your blood pressure    Improve your sense of self and your body image  Exercise tips  Ease into your routine. Set small goals. Then build on them.  Exercise on most days. Aim for a  total of 150 or more minutes of moderate to  vigorous intensity activity each week. Consider 40 minutes, 3 to 4 times a week. For best results, activity should last for 40 minutes on average. It is OK to work up to the 40 minute period over time. Examples of moderate-intensity activity is walking 1 mile in 15 minutes or 30 to 45 minutes of yard work.  Step up your daily activity level. Along with your exercise program, try being more active throughout the day. Walk instead of drive. Do more household tasks or yard work.  Choose one or more activities you enjoy. Walking is one of the easiest things you can do. You can also try swimming, riding a bike, dancing, or taking an exercise class.  Stop exercising and call your doctor if you:    Have chest pain or feel dizzy or lightheaded    Feel burning, tightness, pressure, or heaviness in your chest, neck, shoulders, back, or arms    Have unusual shortness of breath    Have increased joint or muscle pain    Have palpitations or an irregular heartbeat   Date Last Reviewed: 5/1/2016 2000-2018 [employer]. [address] [place] [address]. All rights reserved. This information is not intended as a substitute for professional medical care. Always follow your healthcare professional's instructions.          Signs of Hearing Loss     Hearing much better with one ear can be a sign of hearing loss.     Hearing loss is a problem shared by many people. In fact, it is one of the most common health conditions, particularly as people age. Most people over age 65 have some hearing loss, and by age 80, almost everyone does. Because hearing loss usually occurs slowly over the years, you may not realize your hearing ability has gotten worse.  Have your hearing checked  Contact your healthcare provider if you:    Have to strain to hear normal conversation    Have to watch other people s faces very carefully to follow what they re saying    Need to ask people to  repeat what they ve said    Often misunderstand what people are saying    Turn the volume of the television or radio up so high that others complain    Feel that people are mumbling when they re talking to you    Find that the effort to hear leaves you feeling tired and irritated    Notice, when using the phone, that you hear better with one ear than the other  Date Last Reviewed: 12/1/2016 2000-2018 [employer]. [address] [place] [address], [place] [address]. All rights reserved. This information is not intended as a substitute for professional medical care. Always follow your healthcare professional's instructions.

## 2019-07-10 NOTE — PROGRESS NOTES
"SUBJECTIVE:   Tricia Sosa is a 78 year old female who presents for Preventive Visit.      Are you in the first 12 months of your Medicare coverage?  No    Healthy Habits:     In general, how would you rate your overall health?  Good    Frequency of exercise:  None    Do you usually eat at least 4 servings of fruit and vegetables a day, include whole grains    & fiber and avoid regularly eating high fat or \"junk\" foods?  Yes    Taking medications regularly:  Yes    Medication side effects:  None    Ability to successfully perform activities of daily living:  No assistance needed    Home Safety:  Lack of grab bars in the bathroom    Hearing Impairment:  Difficulty following a conversation in a noisy restaurant or crowded room, feel that people are mumbling or not speaking clearly, need to ask people to speak up or repeat themselves and difficulty understanding soft or whispered speech    In the past 6 months, have you been bothered by leaking of urine?  No    In general, how would you rate your overall mental or emotional health?  Good      PHQ-2 Total Score: 0    Additional concerns today:  Yes (Patient is having trouble standing up stright, )    Do you feel safe in your environment? Yes    Do you have a Health Care Directive? Yes: Patient states has Advance Directive and will bring in a copy to clinic.      Fall risk  Fallen 2 or more times in the past year?: YES  Any fall with injury in the past year?: No  click delete button to remove this line now  Cognitive Screening   1) Repeat 3 items (Leader, Season, Table)    2) Clock draw: NORMAL  3) 3 item recall: Recalls 3 objects  Results: 3 items recalled: COGNITIVE IMPAIRMENT LESS LIKELY    Mini-CogTM Copyright ERNESTINA Vanessa. Licensed by the author for use in [facility]; reprinted with permission (roxi@.[facility]). All rights reserved.      Do you have sleep apnea, excessive snoring or daytime drowsiness?: no    Reviewed and updated as needed this visit by " clinical staff  Tobacco  Allergies  Meds  Med Hx  Surg Hx  Fam Hx  Soc Hx        Reviewed and updated as needed this visit by Provider        Social History     Tobacco Use     Smoking status: Never Smoker     Smokeless tobacco: Never Used   Substance Use Topics     Alcohol use: No     Alcohol/week: 0.0 oz     Frequency: Never         Alcohol Use 7/10/2019   Prescreen: >3 drinks/day or >7 drinks/week? No   Prescreen: >3 drinks/day or >7 drinks/week? -           Pt continues to feel weakness throughout her torso.  Believes this stems from her spine.  Does not have back pain but feels weak and that this contributes to her falls.  She is not interested in the balance center or PT as she has tried this before.  She is interested in seeing sports medicine for further evaluation of her back.    Current providers sharing in care for this patient include:   Patient Care Team:  Monika Whipple Ra, APRN CNP as PCP - General (Family Practice)  Monika Whipple Ra, APRN CNP as Assigned PCP  Asha Shields RN as Registered Nurse (Hematology & Oncology)    The following health maintenance items are reviewed in Epic and correct as of today:  Health Maintenance   Topic Date Due     ADVANCE CARE PLANNING  1940     ZOSTER IMMUNIZATION (2 of 3) 02/26/2007     MEDICARE ANNUAL WELLNESS VISIT  10/09/2018     LIPID  06/11/2019     INFLUENZA VACCINE (1) 09/01/2019     TSH W/FREE T4 REFLEX  10/16/2019     FALL RISK ASSESSMENT  07/10/2020     DTAP/TDAP/TD IMMUNIZATION (3 - Td) 04/21/2021     COLONOSCOPY  07/19/2021     DEXA  Completed     PHQ-2  Completed     IPV IMMUNIZATION  Aged Out     MENINGITIS IMMUNIZATION  Aged Out     Patient Active Problem List   Diagnosis     Traction detachment of retina     Hypothyroidism     CVID (common variable immunodeficiency) (H)     Hemolytic anemia (H)     B12 deficiency     CARDIOVASCULAR SCREENING; LDL GOAL LESS THAN 160     Other autoimmune hemolytic anemias (H)     Right-sided low  back pain with right-sided sciatica, unspecified chronicity     Past Surgical History:   Procedure Laterality Date     C LIGATE FALLOPIAN TUBE       COLONOSCOPY N/A 2016    Procedure: COLONOSCOPY;  Surgeon: Dontae Del Rio MD;  Location: [facility]     HC COLONOSCOPY W BIOPSY  00     HC COLONOSCOPY W BIOPSY  10/8/03     HC COLONOSCOPY W BIOPSY  05    REPEAT IN 5 YEARS     HC CYSTOSCOPY,INSERT URETERAL STENT      Ureteral stent insertion     HC FLEX SIGMOIDOSCOPY W BIOPSY  00     HC LAPAROSCOPY, SURGICAL; CHOLECYSTECTOMY       HC THYROIDECTOMY       LIGATION OF HEMORRHOID(S)      Hemorrhoidectomy     UPPER GI ENDOSCOPY,BIOPSY  10/01/01       Social History     Tobacco Use     Smoking status: Never Smoker     Smokeless tobacco: Never Used   Substance Use Topics     Alcohol use: No     Alcohol/week: 0.0 oz     Frequency: Never     Family History   Problem Relation Age of Onset     Cancer Mother          of colon cancer at age 90     Cerebrovascular Disease Father          at age 85     Dementia Brother      Dementia Brother      Pancreatic Cancer Brother      Macular Degeneration Sister      Macular Degeneration Sister      Breast Cancer Sister      Colon Cancer No family hx of              Review of Systems   Constitutional: Negative for chills and fever.   HENT: Positive for congestion and hearing loss. Negative for ear pain and sore throat.    Eyes: Negative for pain and visual disturbance.   Respiratory: Positive for shortness of breath. Negative for cough.    Cardiovascular: Negative for chest pain, palpitations and peripheral edema.   Gastrointestinal: Negative for abdominal pain, constipation, diarrhea, heartburn, hematochezia and nausea.   Breasts:  Negative for tenderness, breast mass and discharge.   Genitourinary: Positive for pelvic pain. Negative for dysuria, frequency, genital sores, hematuria, urgency, vaginal bleeding and vaginal discharge.   Musculoskeletal: Positive for  "arthralgias. Negative for joint swelling and myalgias.   Skin: Negative for rash.   Neurological: Positive for weakness. Negative for dizziness, headaches and paresthesias.   Psychiatric/Behavioral: Negative for mood changes. The patient is not nervous/anxious.          OBJECTIVE:   /62 (BP Location: Right arm, Patient Position: Chair, Cuff Size: Adult Regular)   Pulse 75   Temp 98.5  F (36.9  C) (Tympanic)   Resp 16   Wt 65.8 kg (145 lb)   LMP  (LMP Unknown)   SpO2 99%   BMI 19.13 kg/m   Estimated body mass index is 19.13 kg/m  as calculated from the following:    Height as of 6/18/19: 1.854 m (6' 1\").    Weight as of this encounter: 65.8 kg (145 lb).  Physical Exam  GENERAL: healthy, alert and no distress  EYES: Eyes grossly normal to inspection  NECK: no adenopathy, no asymmetry, masses, or scars and thyroid normal to palpation  RESP: lungs clear to auscultation - no rales, rhonchi or wheezes  CV: regular rate and rhythm, normal S1 S2, no S3 or S4, no murmur, click or rub, no peripheral edema and peripheral pulses strong  ABDOMEN: soft, nontender, no hepatosplenomegaly, no masses and bowel sounds normal  NEURO: Normal strength and tone, mentation intact and speech normal  PSYCH: mentation appears normal, affect normal/bright    Diagnostic Test Results:  Labs reviewed in Epic    ASSESSMENT / PLAN:   1. Medicare annual wellness visit, subsequent    2. CARDIOVASCULAR SCREENING; LDL GOAL LESS THAN 160  - Lipid panel reflex to direct LDL Fasting; Future  - *MA Screening Digital Bilateral; Future    3. Screening for osteoporosis  - DX Hip/Pelvis/Spine; Future    4. Asymptomatic menopausal state   - DX Hip/Pelvis/Spine; Future    5. Encounter for screening mammogram for malignant neoplasm of breast   - *MA Screening Digital Bilateral; Future    6. Weakness of back  - ORTHO  REFERRAL          End of Life Planning:  Patient currently has an advanced directive: pt has one at home, will bring this " "in.    COUNSELING:  Reviewed preventive health counseling, as reflected in patient instructions    Estimated body mass index is 19.13 kg/m  as calculated from the following:    Height as of 6/18/19: 1.854 m (6' 1\").    Weight as of this encounter: 65.8 kg (145 lb).         reports that she has never smoked. She has never used smokeless tobacco.      Appropriate preventive services were discussed with this patient, including applicable screening as appropriate for cardiovascular disease, diabetes, osteopenia/osteoporosis, and glaucoma.  As appropriate for age/gender, discussed screening for colorectal cancer, prostate cancer, breast cancer, and cervical cancer. Checklist reviewing preventive services available has been given to the patient.    Reviewed patients plan of care and provided an AVS. The Basic Care Plan (routine screening as documented in Health Maintenance) for Tricia meets the Care Plan requirement. This Care Plan has been established and reviewed with the Patient.    Counseling Resources:  ATP IV Guidelines  Pooled Cohorts Equation Calculator  Breast Cancer Risk Calculator  FRAX Risk Assessment  ICSI Preventive Guidelines  Dietary Guidelines for Americans, 2010  USDA's MyPlate  ASA Prophylaxis  Lung CA Screening    SHANIQUE Walsh Ra, CNP  [facility]    Identified Health Risks:  "

## 2019-07-16 DIAGNOSIS — E53.8 B12 DEFICIENCY: ICD-10-CM

## 2019-07-16 DIAGNOSIS — D83.9 CVID (COMMON VARIABLE IMMUNODEFICIENCY) (H): ICD-10-CM

## 2019-07-16 DIAGNOSIS — D59.19 OTHER AUTOIMMUNE HEMOLYTIC ANEMIAS: ICD-10-CM

## 2019-07-16 LAB
ALBUMIN SERPL-MCNC: 3.6 G/DL (ref 3.4–5)
ALP SERPL-CCNC: 103 U/L (ref 40–150)
ALT SERPL W P-5'-P-CCNC: 47 U/L (ref 0–50)
ANION GAP SERPL CALCULATED.3IONS-SCNC: 3 MMOL/L (ref 3–14)
AST SERPL W P-5'-P-CCNC: 82 U/L (ref 0–45)
BASOPHILS # BLD AUTO: 0 10E9/L (ref 0–0.2)
BASOPHILS NFR BLD AUTO: 0.4 %
BILIRUB SERPL-MCNC: 1.4 MG/DL (ref 0.2–1.3)
BUN SERPL-MCNC: 14 MG/DL (ref 7–30)
CALCIUM SERPL-MCNC: 8.1 MG/DL (ref 8.5–10.1)
CHLORIDE SERPL-SCNC: 106 MMOL/L (ref 94–109)
CO2 SERPL-SCNC: 29 MMOL/L (ref 20–32)
CREAT SERPL-MCNC: 0.65 MG/DL (ref 0.52–1.04)
DIFFERENTIAL METHOD BLD: ABNORMAL
EOSINOPHIL # BLD AUTO: 0 10E9/L (ref 0–0.7)
EOSINOPHIL NFR BLD AUTO: 0.7 %
ERYTHROCYTE [DISTWIDTH] IN BLOOD BY AUTOMATED COUNT: 15.4 % (ref 10–15)
GFR SERPL CREATININE-BSD FRML MDRD: 85 ML/MIN/{1.73_M2}
GLUCOSE SERPL-MCNC: 91 MG/DL (ref 70–99)
HCT VFR BLD AUTO: 29.6 % (ref 35–47)
HGB BLD-MCNC: 9.8 G/DL (ref 11.7–15.7)
LDH SERPL L TO P-CCNC: 684 U/L (ref 81–234)
LYMPHOCYTES # BLD AUTO: 0.8 10E9/L (ref 0.8–5.3)
LYMPHOCYTES NFR BLD AUTO: 16.8 %
MCH RBC QN AUTO: 33 PG (ref 26.5–33)
MCHC RBC AUTO-ENTMCNC: 33.1 G/DL (ref 31.5–36.5)
MCV RBC AUTO: 100 FL (ref 78–100)
MONOCYTES # BLD AUTO: 0.8 10E9/L (ref 0–1.3)
MONOCYTES NFR BLD AUTO: 16.3 %
NEUTROPHILS # BLD AUTO: 3 10E9/L (ref 1.6–8.3)
NEUTROPHILS NFR BLD AUTO: 65.8 %
PLATELET # BLD AUTO: 99 10E9/L (ref 150–450)
POTASSIUM SERPL-SCNC: 3.9 MMOL/L (ref 3.4–5.3)
PROT SERPL-MCNC: 6.2 G/DL (ref 6.8–8.8)
RBC # BLD AUTO: 2.97 10E12/L (ref 3.8–5.2)
RETICS # AUTO: 145.1 10E9/L (ref 25–95)
RETICS/RBC NFR AUTO: 4.8 % (ref 0.5–2)
SODIUM SERPL-SCNC: 138 MMOL/L (ref 133–144)
VIT B12 SERPL-MCNC: 540 PG/ML (ref 193–986)
WBC # BLD AUTO: 4.6 10E9/L (ref 4–11)

## 2019-07-16 PROCEDURE — 85025 COMPLETE CBC W/AUTO DIFF WBC: CPT | Performed by: INTERNAL MEDICINE

## 2019-07-16 PROCEDURE — 80053 COMPREHEN METABOLIC PANEL: CPT | Performed by: INTERNAL MEDICINE

## 2019-07-16 PROCEDURE — 85045 AUTOMATED RETICULOCYTE COUNT: CPT | Performed by: INTERNAL MEDICINE

## 2019-07-16 PROCEDURE — 83615 LACTATE (LD) (LDH) ENZYME: CPT | Performed by: INTERNAL MEDICINE

## 2019-07-16 PROCEDURE — 86360 T CELL ABSOLUTE COUNT/RATIO: CPT | Performed by: INTERNAL MEDICINE

## 2019-07-16 PROCEDURE — 82784 ASSAY IGA/IGD/IGG/IGM EACH: CPT | Performed by: INTERNAL MEDICINE

## 2019-07-16 PROCEDURE — 86359 T CELLS TOTAL COUNT: CPT | Performed by: INTERNAL MEDICINE

## 2019-07-16 PROCEDURE — 83010 ASSAY OF HAPTOGLOBIN QUANT: CPT | Performed by: INTERNAL MEDICINE

## 2019-07-16 PROCEDURE — 82607 VITAMIN B-12: CPT | Performed by: INTERNAL MEDICINE

## 2019-07-16 PROCEDURE — 36415 COLL VENOUS BLD VENIPUNCTURE: CPT | Performed by: INTERNAL MEDICINE

## 2019-07-17 LAB
CD3 CELLS # BLD: 617 CELLS/UL (ref 603–2990)
CD3 CELLS NFR BLD: 87 % (ref 49–84)
CD3+CD4+ CELLS # BLD: 230 CELLS/UL (ref 441–2156)
CD3+CD4+ CELLS NFR BLD: 33 % (ref 28–63)
CD3+CD4+ CELLS/CD3+CD8+ CLL BLD: 0.7 % (ref 1.4–2.6)
CD3+CD8+ CELLS # BLD: 331 CELLS/UL (ref 125–1312)
CD3+CD8+ CELLS NFR BLD: 47 % (ref 10–40)
HAPTOGLOB SERPL-MCNC: <6 MG/DL (ref 35–175)
IFC SPECIMEN: ABNORMAL
IGA SERPL-MCNC: <7 MG/DL (ref 70–380)
IGG SERPL-MCNC: 809 MG/DL (ref 695–1620)
IGM SERPL-MCNC: 263 MG/DL (ref 60–265)

## 2019-07-23 ENCOUNTER — ONCOLOGY VISIT (OUTPATIENT)
Dept: ONCOLOGY | Facility: CLINIC | Age: 79
End: 2019-07-23
Attending: INTERNAL MEDICINE
Payer: MEDICARE

## 2019-07-23 ENCOUNTER — INFUSION THERAPY VISIT (OUTPATIENT)
Dept: INFUSION THERAPY | Facility: CLINIC | Age: 79
End: 2019-07-23
Attending: INTERNAL MEDICINE
Payer: MEDICARE

## 2019-07-23 VITALS
HEIGHT: 72 IN | DIASTOLIC BLOOD PRESSURE: 42 MMHG | HEART RATE: 74 BPM | BODY MASS INDEX: 19.72 KG/M2 | RESPIRATION RATE: 16 BRPM | TEMPERATURE: 98.3 F | WEIGHT: 145.6 LBS | OXYGEN SATURATION: 99 % | SYSTOLIC BLOOD PRESSURE: 112 MMHG

## 2019-07-23 DIAGNOSIS — D83.9 CVID (COMMON VARIABLE IMMUNODEFICIENCY) (H): Primary | ICD-10-CM

## 2019-07-23 DIAGNOSIS — E53.8 B12 DEFICIENCY: ICD-10-CM

## 2019-07-23 DIAGNOSIS — D59.19 OTHER AUTOIMMUNE HEMOLYTIC ANEMIAS: ICD-10-CM

## 2019-07-23 PROCEDURE — 96375 TX/PRO/DX INJ NEW DRUG ADDON: CPT

## 2019-07-23 PROCEDURE — G0463 HOSPITAL OUTPT CLINIC VISIT: HCPCS | Mod: 25

## 2019-07-23 PROCEDURE — 96365 THER/PROPH/DIAG IV INF INIT: CPT

## 2019-07-23 PROCEDURE — 25000128 H RX IP 250 OP 636: Performed by: INTERNAL MEDICINE

## 2019-07-23 PROCEDURE — 99214 OFFICE O/P EST MOD 30 MIN: CPT | Performed by: INTERNAL MEDICINE

## 2019-07-23 PROCEDURE — 96366 THER/PROPH/DIAG IV INF ADDON: CPT

## 2019-07-23 RX ORDER — HEPARIN SODIUM,PORCINE 10 UNIT/ML
5 VIAL (ML) INTRAVENOUS
Status: CANCELLED | OUTPATIENT
Start: 2019-07-23

## 2019-07-23 RX ORDER — ACETAMINOPHEN 325 MG/1
650 TABLET ORAL ONCE
Status: CANCELLED
Start: 2019-07-23

## 2019-07-23 RX ORDER — DIPHENHYDRAMINE HCL 25 MG
50 CAPSULE ORAL ONCE
Status: CANCELLED | OUTPATIENT
Start: 2019-07-23

## 2019-07-23 RX ORDER — HEPARIN SODIUM (PORCINE) LOCK FLUSH IV SOLN 100 UNIT/ML 100 UNIT/ML
5 SOLUTION INTRAVENOUS
Status: CANCELLED | OUTPATIENT
Start: 2019-07-23

## 2019-07-23 RX ADMIN — HUMAN IMMUNOGLOBULIN G 35 G: 20 LIQUID INTRAVENOUS at 12:03

## 2019-07-23 RX ADMIN — HYDROCORTISONE SODIUM SUCCINATE 100 MG: 100 INJECTION, POWDER, FOR SOLUTION INTRAMUSCULAR; INTRAVENOUS at 12:03

## 2019-07-23 ASSESSMENT — PAIN SCALES - GENERAL: PAINLEVEL: NO PAIN (0)

## 2019-07-23 ASSESSMENT — MIFFLIN-ST. JEOR: SCORE: 1268.32

## 2019-07-23 NOTE — PROGRESS NOTES
[facility] CANCER CLINIC  FOLLOW-UP VISIT NOTE    PATIENT NAME: Tricia Sosa MRN # [id number]  DATE OF VISIT: Jul 23, 2019 YOB: 1940    REFERRING PROVIDER: No referring provider defined for this encounter.    DIAGNOSIS: Hemolytic anemia-autoimmune; IgA deficiency    TREATMENT SUMMARY:  Tricia has been diagnosed with IgA deficiency since her around 2000.  She was very sick at the time and had severe diarrhea.  She lost about 40 pounds in weight.  The workup revealed that she had markedly diminished CD4 counts of 48 and was diagnosed with CMV colitis.  Since then she has been followed in hematology clinic at [place] until recently.  She was noted to have anemia which was fairly long-standing.  She was initially referred for anemia in 2004 and also had a bone marrow biopsy done at that time which revealed hypercellular bone marrow with 70-80% cellularity and normal trilineage hematopoiesis and no morphologic features of MDS or lymphoproliferation.  Her anemia was attributed to her chronic underlying disease.  At the next evaluation in 2002 on 4 aggressive anemia there was no evidence of hemolysis, iron deficiency, B12 or folate deficiency.  Bone marrow biopsy was not pursued.  In summer of 2015 she had progressive fatigue, dyspnea on exertion and worsening anemia with a hemoglobin now of 9.  Workup at this time did suggest a hemolytic anemia with elevated LDH at 709, undetectable haptoglobin and elevated unconjugated bilirubin at 3.3.  Monospecific MARIKA was positive for both IgG and complement.  Cold agglutinin screen was positive with very low titer 1:64.  She was started on prednisone 60 mg daily with supplementation of iron folate and B12 starting 7/31/15.  Her prednisone has been slowly tapered and was discontinued off in January 2016.  She was last followed at [facility] on March 10 when she had stable hemoglobin.      SUBJECTIVE   Tricia comes alone for this clinic visit for  "her hemolytic anemia.    Tricia has significant fatigue at this clinic visit. She notes that she got up early as usual (around 4:30) and has been active. She feels very tired. She feels she is more tired that usual.      She is here with labs done prior to clinic visit.     Wt Readings from Last 10 Encounters:   07/23/19 66 kg (145 lb 9.6 oz)   07/10/19 65.8 kg (145 lb)   06/18/19 67.1 kg (148 lb)   05/28/19 67.1 kg (148 lb)   04/30/19 68.2 kg (150 lb 6.4 oz)   04/02/19 69.3 kg (152 lb 11.2 oz)   02/05/19 69.9 kg (154 lb 3.2 oz)   01/08/19 70.1 kg (154 lb 8.7 oz)   12/11/18 70.4 kg (155 lb 3.2 oz)   11/13/18 70.8 kg (156 lb)       PAST MEDICAL HISTORY   1. Common variable immunodeficiency syndrome  2. 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 Selective IgA deficiency  4. Leukopenia with markedly low CD4 counts on Bactrim prophylaxis  5. History of fall with subdural hematoma in 6/11/14 with complete resolution  6. Hashimoto's disease status post partial thyroidectomy      CURRENT OUTPATIENT MEDICATIONS     Current Outpatient Medications   Medication Sig     cyanocobalamin (VITAMIN  B-12) 1000 MCG tablet      FLUOCINOLONE ACETONIDE SCALP 0.01 % OIL oil      folic acid (FOLVITE) 1 MG tablet      ketoconazole 1 % shampoo      levothyroxine (SYNTHROID/LEVOTHROID) 150 MCG tablet TAKE 1 TABLET BY MOUTH ONCE DAILY     sulfamethoxazole-trimethoprim (BACTRIM DS/SEPTRA DS) 800-160 MG per tablet Take 1 pill orally on Mon, Wed and Friday     No current facility-administered medications for this visit.         ALLERGIES     Allergies   Allergen Reactions     Doxycycline         REVIEW OF SYSTEMS   As above in the HPI, o/w complete 12-point ROS was negative.     PHYSICAL EXAM   /42   Pulse 74   Temp 98.3  F (36.8  C) (Tympanic)   Resp 16   Ht 1.854 m (6' 1\")   Wt 66 kg (145 lb 9.6 oz)   LMP  (LMP Unknown)   SpO2 99%   BMI 19.21 kg/m      SpO2 Readings from Last 4 " Encounters:   11/13/18 99%   10/16/18 99%   09/27/18 98%   09/19/18 100%     Wt Readings from Last 3 Encounters:   07/23/19 66 kg (145 lb 9.6 oz)   07/10/19 65.8 kg (145 lb)   06/18/19 67.1 kg (148 lb)     GEN: NAD  HEENT: PERRL, EOMI, no icterus, injection or pallor. Oropharynx is clear.  NECK: no cervical or supraclavicular lymphadenopathy  LUNGS: clear bilaterally  CV: regular, no murmurs, rubs, or gallops  ABDOMEN: soft, non-tender, non-distended, normal bowel sounds, no hepatosplenomegaly by percussion or palpation  EXT: warm, well perfused, no edema  NEURO: alert  SKIN: no rashes     LABORATORY AND IMAGING STUDIES     Recent Labs   Lab Test 07/16/19  1000 04/23/19  0944 01/29/19  1007 11/06/18  1002 10/16/18  1036    141 140 140 139   POTASSIUM 3.9 3.9 4.0 4.2 3.8   CHLORIDE 106 107 106 105 106   CO2 29 27 29 26 25   ANIONGAP 3 7 5 9 8   BUN 14 14 16 14 13   CR 0.65 0.69 0.74 0.86 0.63   GLC 91 86 65* 95 123*   CULLEN 8.1* 8.7 8.5 8.8 7.9*     No results for input(s): MAG, PHOS in the last 17211 hours.  Recent Labs   Lab Test 07/16/19  1000 04/23/19  0944 01/29/19  1007 11/06/18  1002 10/16/18  1036   WBC 4.6 3.5* 4.5 4.2 5.4   HGB 9.8* 11.4* 11.2* 11.9 10.8*   PLT 99* 120* 145* 155 148*    92 90 89 91   NEUTROPHIL 65.8 55.6 53.4 59.9 68.5     Recent Labs   Lab Test 07/16/19  1000 04/23/19  0944 01/29/19  1007   BILITOTAL 1.4* 0.8 0.7   ALKPHOS 103 107 107   ALT 47 35 29   AST 82* 49* 41   ALBUMIN 3.6 3.8 3.8   * 346* 269*     TSH   Date Value Ref Range Status   10/16/2018 2.81 0.40 - 4.00 mU/L Final   10/20/2017 1.90 0.40 - 4.00 mU/L Final   06/19/2017 1.82 0.40 - 4.00 mU/L Final     Recent Labs   Lab Test 07/16/19  1000 04/23/19  0944 01/29/19  1007 11/06/18  1002 10/16/18  1036  04/14/15  1116   B12 540 814 696 834 847   < > 277   FOLIC  --   --   --   --   --   --  10.7   HGB 9.8* 11.4* 11.2* 11.9 10.8*   < >  --    RETICABSCT 145.1* 102.7* 80.9 75.0 65.1   < >  --    RETP 4.8* 2.8* 2.1*  1.9 1.9   < >  --     < > = values in this interval not displayed.        Recent Labs   Lab Test 07/16/19  1000 04/23/19  0944 01/29/19  1007 11/06/18  1002 10/16/18  1036  02/25/16  0903   IGA <7* <7* <7* <7* <7*   < >  --     954 969 974 889   < >  --     79 91 130 131   < >  --    ELPM  --   --   --   --   --   --  0.0    < > = values in this interval not displayed.        ASSESSMENT AND PLAN   1. 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 Common variable immunodeficiency disorder  4. Splenomegaly    Tricia continues to do well except for fatigue which seems to be worsening.     I reviewed all her labs with her.  For the most important one - her Hgb is lower at 9.8 g/dl this time. She continues to have no Ig A, though her Ig G/Ig M are stable with replacement. Her hemoglobin has again dropped. Her Hgb levels had been higher since we increased frequency to every 4 weeks from every 6 weeks.     We could try rituximab every week x 4 weeks. I reviewed rationale, risks and benefits. She would like to think more on this. We would follow her again in 2 months this time with labs prior to visit. If her Hgb continues to drop, we would start her on rituximab, otherwise we might consider continuing with IV IG as current.     She is going to have a tooth extraction .  It is okay to have it after IV IG (should be preferably after IV IG).     I will see her in 8 weeks with labs.      Jose Spain ,  Division of Hematology, Oncology & Transplantation  [facility].

## 2019-07-23 NOTE — NURSING NOTE
"Oncology Rooming Note    July 23, 2019 10:26 AM   Tricia Sosa is a 78 year old female who presents for:    Chief Complaint   Patient presents with     Oncology Clinic Visit     CVID (common variable immunodeficiency)     Initial Vitals: /42   Pulse 74   Temp 98.3  F (36.8  C) (Tympanic)   Resp 16   Ht 1.854 m (6' 1\")   Wt 66 kg (145 lb 9.6 oz)   LMP  (LMP Unknown)   SpO2 99%   BMI 19.21 kg/m   Estimated body mass index is 19.21 kg/m  as calculated from the following:    Height as of this encounter: 1.854 m (6' 1\").    Weight as of this encounter: 66 kg (145 lb 9.6 oz). Body surface area is 1.84 meters squared.  No Pain (0) Comment: Data Unavailable   No LMP recorded (lmp unknown). Patient is postmenopausal.  Allergies reviewed: Yes  Medications reviewed: Yes    Medications: Medication refills not needed today.  Pharmacy name entered into [facility]:    [id number] [place], [place] - [address] [facility] [address] [facility]    Clinical concerns: Follow Up       Tricia Marshall CMA              "

## 2019-07-23 NOTE — LETTER
7/23/2019         RE: Tricia Sosa  [id number] [address] [address]        Dear Colleague,    Thank you for referring your patient, Tricia Sosa, to the [facility] CANCER CARE. Please see a copy of my visit note below.    [facility] CANCER CLINIC  FOLLOW-UP VISIT NOTE    PATIENT NAME: Tricia Sosa MRN # [id number]  DATE OF VISIT: Jul 23, 2019 YOB: 1940    REFERRING PROVIDER: No referring provider defined for this encounter.    DIAGNOSIS: Hemolytic anemia-autoimmune; IgA deficiency    TREATMENT SUMMARY:  Tricia has been diagnosed with IgA deficiency since her around 2000.  She was very sick at the time and had severe diarrhea.  She lost about 40 pounds in weight.  The workup revealed that she had markedly diminished CD4 counts of 48 and was diagnosed with CMV colitis.  Since then she has been followed in hematology clinic at [place] until recently.  She was noted to have anemia which was fairly long-standing.  She was initially referred for anemia in 2004 and also had a bone marrow biopsy done at that time which revealed hypercellular bone marrow with 70-80% cellularity and normal trilineage hematopoiesis and no morphologic features of MDS or lymphoproliferation.  Her anemia was attributed to her chronic underlying disease.  At the next evaluation in 2002 on 4 aggressive anemia there was no evidence of hemolysis, iron deficiency, B12 or folate deficiency.  Bone marrow biopsy was not pursued.  In summer of 2015 she had progressive fatigue, dyspnea on exertion and worsening anemia with a hemoglobin now of 9.  Workup at this time did suggest a hemolytic anemia with elevated LDH at 709, undetectable haptoglobin and elevated unconjugated bilirubin at 3.3.  Monospecific MARIKA was positive for both IgG and complement.  Cold agglutinin screen was positive with very low titer 1:64.  She was started on prednisone 60 mg daily with supplementation of iron folate  and B12 starting 7/31/15.  Her prednisone has been slowly tapered and was discontinued off in January 2016.  She was last followed at [facility] on March 10 when she had stable hemoglobin.      SUBJECTIVE   Tricia comes alone for this clinic visit for her hemolytic anemia.    Tricia has significant fatigue at this clinic visit. She notes that she got up early as usual (around 4:30) and has been active. She feels very tired. She feels she is more tired that usual.      She is here with labs done prior to clinic visit.     Wt Readings from Last 10 Encounters:   07/23/19 66 kg (145 lb 9.6 oz)   07/10/19 65.8 kg (145 lb)   06/18/19 67.1 kg (148 lb)   05/28/19 67.1 kg (148 lb)   04/30/19 68.2 kg (150 lb 6.4 oz)   04/02/19 69.3 kg (152 lb 11.2 oz)   02/05/19 69.9 kg (154 lb 3.2 oz)   01/08/19 70.1 kg (154 lb 8.7 oz)   12/11/18 70.4 kg (155 lb 3.2 oz)   11/13/18 70.8 kg (156 lb)       PAST MEDICAL HISTORY   1. Common variable immunodeficiency syndrome  2. 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 Selective IgA deficiency  4. Leukopenia with markedly low CD4 counts on Bactrim prophylaxis  5. History of fall with subdural hematoma in 6/11/14 with complete resolution  6. Hashimoto's disease status post partial thyroidectomy      CURRENT OUTPATIENT MEDICATIONS     Current Outpatient Medications   Medication Sig     cyanocobalamin (VITAMIN  B-12) 1000 MCG tablet      FLUOCINOLONE ACETONIDE SCALP 0.01 % OIL oil      folic acid (FOLVITE) 1 MG tablet      ketoconazole 1 % shampoo      levothyroxine (SYNTHROID/LEVOTHROID) 150 MCG tablet TAKE 1 TABLET BY MOUTH ONCE DAILY     sulfamethoxazole-trimethoprim (BACTRIM DS/SEPTRA DS) 800-160 MG per tablet Take 1 pill orally on Mon, Wed and Friday     No current facility-administered medications for this visit.         ALLERGIES     Allergies   Allergen Reactions     Doxycycline         REVIEW OF SYSTEMS   As above in the HPI, o/w complete  "12-point ROS was negative.     PHYSICAL EXAM   /42   Pulse 74   Temp 98.3  F (36.8  C) (Tympanic)   Resp 16   Ht 1.854 m (6' 1\")   Wt 66 kg (145 lb 9.6 oz)   LMP  (LMP Unknown)   SpO2 99%   BMI 19.21 kg/m       SpO2 Readings from Last 4 Encounters:   11/13/18 99%   10/16/18 99%   09/27/18 98%   09/19/18 100%     Wt Readings from Last 3 Encounters:   07/23/19 66 kg (145 lb 9.6 oz)   07/10/19 65.8 kg (145 lb)   06/18/19 67.1 kg (148 lb)     GEN: NAD  HEENT: PERRL, EOMI, no icterus, injection or pallor. Oropharynx is clear.  NECK: no cervical or supraclavicular lymphadenopathy  LUNGS: clear bilaterally  CV: regular, no murmurs, rubs, or gallops  ABDOMEN: soft, non-tender, non-distended, normal bowel sounds, no hepatosplenomegaly by percussion or palpation  EXT: warm, well perfused, no edema  NEURO: alert  SKIN: no rashes     LABORATORY AND IMAGING STUDIES     Recent Labs   Lab Test 07/16/19  1000 04/23/19  0944 01/29/19  1007 11/06/18  1002 10/16/18  1036    141 140 140 139   POTASSIUM 3.9 3.9 4.0 4.2 3.8   CHLORIDE 106 107 106 105 106   CO2 29 27 29 26 25   ANIONGAP 3 7 5 9 8   BUN 14 14 16 14 13   CR 0.65 0.69 0.74 0.86 0.63   GLC 91 86 65* 95 123*   CULLEN 8.1* 8.7 8.5 8.8 7.9*     No results for input(s): MAG, PHOS in the last 13776 hours.  Recent Labs   Lab Test 07/16/19  1000 04/23/19  0944 01/29/19  1007 11/06/18  1002 10/16/18  1036   WBC 4.6 3.5* 4.5 4.2 5.4   HGB 9.8* 11.4* 11.2* 11.9 10.8*   PLT 99* 120* 145* 155 148*    92 90 89 91   NEUTROPHIL 65.8 55.6 53.4 59.9 68.5     Recent Labs   Lab Test 07/16/19  1000 04/23/19  0944 01/29/19  1007   BILITOTAL 1.4* 0.8 0.7   ALKPHOS 103 107 107   ALT 47 35 29   AST 82* 49* 41   ALBUMIN 3.6 3.8 3.8   * 346* 269*     TSH   Date Value Ref Range Status   10/16/2018 2.81 0.40 - 4.00 mU/L Final   10/20/2017 1.90 0.40 - 4.00 mU/L Final   06/19/2017 1.82 0.40 - 4.00 mU/L Final     Recent Labs   Lab Test 07/16/19  1000 04/23/19  0944 " 01/29/19  1007 11/06/18  1002 10/16/18  1036  04/14/15  1116   B12 540 814 696 834 847   < > 277   FOLIC  --   --   --   --   --   --  10.7   HGB 9.8* 11.4* 11.2* 11.9 10.8*   < >  --    RETICABSCT 145.1* 102.7* 80.9 75.0 65.1   < >  --    RETP 4.8* 2.8* 2.1* 1.9 1.9   < >  --     < > = values in this interval not displayed.        Recent Labs   Lab Test 07/16/19  1000 04/23/19  0944 01/29/19  1007 11/06/18  1002 10/16/18  1036  02/25/16  0903   IGA <7* <7* <7* <7* <7*   < >  --     954 969 974 889   < >  --     79 91 130 131   < >  --    ELPM  --   --   --   --   --   --  0.0    < > = values in this interval not displayed.        ASSESSMENT AND PLAN   1. 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 Common variable immunodeficiency disorder  4. Splenomegaly    Tricia continues to do well except for fatigue which seems to be worsening.     I reviewed all her labs with her.  For the most important one - her Hgb is lower at 9.8 g/dl this time. She continues to have no Ig A, though her Ig G/Ig M are stable with replacement. Her hemoglobin has again dropped. Her Hgb levels had been higher since we increased frequency to every 4 weeks from every 6 weeks.     We could try rituximab every week x 4 weeks. I reviewed rationale, risks and benefits. She would like to think more on this. We would follow her again in 2 months this time with labs prior to visit. If her Hgb continues to drop, we would start her on rituximab, otherwise we might consider continuing with IV IG as current.     She is going to have a tooth extraction .  It is okay to have it after IV IG (should be preferably after IV IG).     I will see her in 8 weeks with labs.      Jose Spain ,  Division of Hematology, Oncology & Transplantation  [facility].        Again, thank you for allowing me to participate in the care  of your patient.        Sincerely,        Jose Summers MD

## 2019-07-23 NOTE — PROGRESS NOTES
Infusion Nursing Note:  Tricia Sosa presents today for IVIG.    Patient seen by provider today: Yes: Dr. Summers   present during visit today: Not Applicable.    Note: Patient has a partial tooth extraction scheduled for 7/29.  Dr Summers notified and said it was ok to proceed. No need to reschedule her dental procedure.    Intravenous Access:  Peripheral IV placed.    Treatment Conditions:  Biological Infusion Checklist:  ~~~ NOTE: If the patient answers yes to any of the questions below, hold the infusion and contact ordering provider or on-call provider.    1. Have you recently had an elevated temperature, fever, chills, productive cough, coughing for 3 weeks or longer or hemoptysis, abnormal vital signs, night sweats,  chest pain or have you noticed a decrease in your appetite, unexplained weight loss or fatigue? No  2. Do you have any open wounds or new incisions? No  3. Do you have any recent or upcoming hospitalizations, surgeries or dental procedures? Yes, partial tooth extraction scheduled for 7/29.  4. Do you currently have or recently have had any signs of illness or infection or are you on any antibiotics? No  5. Have you had any new, sudden or worsening abdominal pain? No  6. Have you or anyone in your household received a live vaccination in the past 4 weeks? Please note:  No live vaccines while on biologic/chemotherapy until 6 months after the last treatment.  Patient can receive the flu vaccine (shot only) and the pneumovax.  It is optimal for the patient to get these vaccines mid cycle, but they can be given at any time as long as it is not on the day of the infusion. No  7. Have you recently been diagnosed with any new nervous system diseases (ie. Multiple sclerosis, Guillain [place], seizures, neurological changes) or cancer diagnosis? No  8. Are you on any form of radiation or chemotherapy? No  9. Are you pregnant or breast feeding or do you have plans of pregnancy in the future? No  10. Have  you been having any signs of worsening depression or suicidal ideations?  (benlysta only) No  11. Have there been any other new onset medical symptoms? No      Post Infusion Assessment:  Patient tolerated infusion without incident.  Site patent and intact, free from redness, edema or discomfort.  No evidence of extravasations.  Access discontinued per protocol.     Biologic Infusion Post Education: Call the triage nurse at your clinic or seek medical attention if you have chills and/or temperature greater than or equal to 100.5, uncontrolled nausea/vomiting, diarrhea, constipation, dizziness, shortness of breath, chest pain, heart palpitations, weakness or any other new or concerning symptoms, questions or concerns.  You cannot have any live virus vaccines prior to or during treatment or up to 6 months post infusion.  If you have an upcoming surgery, medical procedure or dental procedure during treatment, this should be discussed with your ordering physician and your surgeon/dentist.  If you are having any concerning symptom, if you are unsure if you should get your next infusion or wish to speak to a provider before your next infusion, please call your care coordinator or triage nurse at your clinic to notify them so we can adequately serve you.     Discharge Plan:   AVS to patient via [employer].  Patient will return 8/20 for next appointment.   Patient discharged in stable condition accompanied by: self.  Departure Mode: Ambulatory.    Ally Eller RN

## 2019-08-05 ENCOUNTER — OFFICE VISIT (OUTPATIENT)
Dept: ORTHOPEDICS | Facility: CLINIC | Age: 79
End: 2019-08-05
Payer: MEDICARE

## 2019-08-05 VITALS
HEIGHT: 72 IN | DIASTOLIC BLOOD PRESSURE: 62 MMHG | SYSTOLIC BLOOD PRESSURE: 124 MMHG | WEIGHT: 145 LBS | BODY MASS INDEX: 19.64 KG/M2

## 2019-08-05 DIAGNOSIS — R29.898 WEAKNESS OF BOTH LEGS: ICD-10-CM

## 2019-08-05 DIAGNOSIS — M47.816 LUMBAR SPONDYLOSIS: ICD-10-CM

## 2019-08-05 DIAGNOSIS — M51.369 LUMBAR DEGENERATIVE DISC DISEASE: Primary | ICD-10-CM

## 2019-08-05 PROCEDURE — 99204 OFFICE O/P NEW MOD 45 MIN: CPT | Performed by: FAMILY MEDICINE

## 2019-08-05 ASSESSMENT — MIFFLIN-ST. JEOR: SCORE: 1265.6

## 2019-08-05 NOTE — PROGRESS NOTES
ASSESSMENT & PLAN  Patient Instructions     1. Lumbar degenerative disc disease    2. Lumbar spondylosis    3. Weakness of both legs      -Patient is here for bilateral leg and back weakness/fatigue  -Reviewed x-rays from 3 years ago as well as her previous physical therapy notes and notes from her neurologist  -Patient has chronic multilevel lumbar degenerative disc disease, mild scoliosis and arthritis.  -Patient has had physical therapy last year but has not continued with her home exercises.  -Patient also has increased fatigue from her anemia and medical conditions.  -Patient will restart physical therapy with Aura Dumont at [place] for comprehensive back and leg strengthening and improvements with balance.  -Patient had an EMG done at the [facility] a couple of years ago and so we will attempt to get the results to review at the next visit as well.  -Patient will follow-up in 6 weeks for reevaluation and progression of activity.  -Patient was also given a pamphlet for silver sneakers to begin at home fitness program when she is ready.  -Call direct clinic number [[phone]] at any time with questions or concerns.    Albert Yeo MD [facility]  [facility]          -----    SUBJECTIVE  Tricia Sosa is a/an 78 year old female who is seen in consultation at the request of  Monika Whipple C.N.P. for evaluation of weakness of bilateral legs. The patient is seen by themselves.    Onset: 10 years(s) ago. Reports insidious onset without acute precipitating event.  Location of Pain: patient denies pain but does complain of weakness of bilateral legs and lower back  Rating of Pain at worst: 0/10  Rating of Pain Currently: 0/10  Worsened by: walking, standing, going up and down stairs, lifting, getting out of a chair  Better with: patient unable to identify any treatments that have helped decrease her weakness  Treatments tried:  rest/activity avoidance, home exercises, physical therapy (6 visits in 2017/2018) and previous imaging (xray 8/20/15)  Quality: weakness  Red flags: Weakness: Yes - bilateral legs, bowel/bladder loss: No, foot drop: No  Associated symptoms: numbness in bilateral feet  Orthopedic history: YES - h/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 Date: 20172018, h/o CVID  Relevant surgical history: NO  Social history: social history: retired    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, and posterior vitreous detachment     Urinary tract infection, site not specified     Recurrent UTI's     Social History     Socioeconomic History     Marital status:      Spouse name: Not on file     Number of children: Not on file     Years of education: Not on file     Highest education level: Not on file   Occupational History     Not on file   Social Needs     Financial resource strain: Not on file     Food insecurity:     Worry: Not on file     Inability: Not on file     Transportation needs:     Medical: Not on file     Non-medical: Not on file   Tobacco Use     Smoking status: Never Smoker     Smokeless tobacco: Never Used   Substance and Sexual Activity     Alcohol use: No     Alcohol/week: 0.0 oz     Frequency: Never     Drug use: No     Sexual activity: Never   Lifestyle     Physical activity:     Days per week: Not on file     Minutes per session: Not on file     Stress: Not on file   Relationships     Social connections:     Talks on phone: Not on file     Gets together: Not on file     Attends Jewish service: Not on file     Active member of club or organization: Not on file     Attends meetings of clubs or organizations: Not on file     Relationship status: Not on file     Intimate partner  "violence:     Fear of current or ex partner: Not on file     Emotionally abused: Not on file     Physically abused: Not on file     Forced sexual activity: Not on file   Other Topics Concern     Parent/sibling w/ CABG, MI or angioplasty before 65F 55M? Not Asked   Social History Narrative     Not on file         Patient's past medical, surgical, social, and family histories were reviewed today and no changes are noted.    REVIEW OF SYSTEMS:  10 point ROS is negative other than symptoms noted above in HPI, Past Medical History or as stated below  Constitutional: NEGATIVE for fever, chills, change in weight  Skin: NEGATIVE for worrisome rashes, moles or lesions  GI/: NEGATIVE for bowel or bladder changes  Neuro: NEGATIVE for weakness, dizziness or paresthesias    OBJECTIVE:  /62   Ht 1.854 m (6' 1\")   Wt 65.8 kg (145 lb)   LMP  (LMP Unknown)   BMI 19.13 kg/m     General: healthy, alert and in no distress  HEENT: no scleral icterus or conjunctival erythema  Skin: no suspicious lesions or rash. No jaundice.  CV: no pedal edema  Resp: normal respiratory effort without conversational dyspnea   Psych: normal mood and affect  Gait: normal steady gait with appropriate coordination and balance  Neuro: normal light touch sensory exam of the bilateral lower extremities.  DTR's 2+ patella and achilles bilaterally.  MSK:  THORACIC/LUMBAR SPINE  Inspection:    No gross deformity/asymmetry  Palpation:   remainder of landmarks are nontender.  Range of Motion:     Lumbar flexion within normal limits    Lumbar extension limited by tightness  Strength:    quadriceps 4+/5, hamstrings 4+/5, gastrocsoleus 5-/5, tibialis anterior 5-/5, extensor hallicus longus 5-/5  Special Tests:    Positive: none    Negative: straight leg raise (bilateral), slump test (bilateral)      Independent visualization of the below image:  No results found for this or any previous visit (from the past 24 hour(s)).  XR LUMBAR SPINE 2-3 VIEWS  8/20/2015 " 4:53 PM     HISTORY:  Pain.     COMPARISON:  None.     IMPRESSION  IMPRESSION:  Lumbar scoliosis convex right with associated multilevel  degenerative changes. Disc space narrowing from L2-3 through L4-5,  most prominent at L4-5. Posterior alignment is satisfactory.     JERI SAEED MD    Patient's conditions were thoroughly discussed during today's visit with greater than 50% of the visit spent counseling the patient with total time spent face-to-face with the patient being 50 minutes.    Albert Yeo MD [facility]

## 2019-08-05 NOTE — LETTER
8/5/2019         RE: Tricia Sosa  [address]  [place] [address]        Dear Colleague,    Thank you for referring your patient, Tricia Sosa, to the [facility]. Please see a copy of my visit note below.    ASSESSMENT & PLAN  Patient Instructions     1. Lumbar degenerative disc disease    2. Lumbar spondylosis    3. Weakness of both legs      -Patient is here for bilateral leg and back weakness/fatigue  -Reviewed x-rays from 3 years ago as well as her previous physical therapy notes and notes from her neurologist  -Patient has chronic multilevel lumbar degenerative disc disease, mild scoliosis and arthritis.  -Patient has had physical therapy last year but has not continued with her home exercises.  -Patient also has increased fatigue from her anemia and medical conditions.  -Patient will restart physical therapy with Aura Dumont at [place] for comprehensive back and leg strengthening and improvements with balance.  -Patient had an EMG done at the [facility] a couple of years ago and so we will attempt to get the results to review at the next visit as well.  -Patient will follow-up in 6 weeks for reevaluation and progression of activity.  -Patient was also given a pamphlet for silver sneakers to begin at home fitness program when she is ready.  -Call direct clinic number [[phone]] at any time with questions or concerns.    Albert Yeo MD [facility] and [facility] Medicine  [facility] [place]          -----    SUBJECTIVE  Tricia Sosa is a/an 78 year old female who is seen in consultation at the request of  Monika Whipple C.N.P. for evaluation of weakness of bilateral legs. The patient is seen by themselves.    Onset: 10 years(s) ago. Reports insidious onset without acute precipitating event.  Location of Pain: patient denies pain but does complain of weakness of bilateral legs and lower back  Rating of Pain at  worst: 0/10  Rating of Pain Currently: 0/10  Worsened by: walking, standing, going up and down stairs, lifting, getting out of a chair  Better with: patient unable to identify any treatments that have helped decrease her weakness  Treatments tried: rest/activity avoidance, home exercises, physical therapy (6 visits in 2017/2018) and previous imaging (xray 8/20/15)  Quality: weakness  Red flags: Weakness: Yes - bilateral legs, bowel/bladder loss: No, foot drop: No  Associated symptoms: numbness in bilateral feet  Orthopedic history: YES - h/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 Date: 20172018, h/o CVID  Relevant surgical history: NO  Social history: social history: retired    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, and posterior vitreous detachment     Urinary tract infection, site not specified     Recurrent UTI's     Social History     Socioeconomic History     Marital status:      Spouse name: Not on file     Number of children: Not on file     Years of education: Not on file     Highest education level: Not on file   Occupational History     Not on file   Social Needs     Financial resource strain: Not on file     Food insecurity:     Worry: Not on file     Inability: Not on file     Transportation needs:     Medical: Not on file     Non-medical: Not on file   Tobacco Use     Smoking status: Never Smoker     Smokeless tobacco: Never Used   Substance and Sexual Activity     Alcohol use: No     Alcohol/week: 0.0 oz     Frequency: Never     Drug use: No     Sexual activity: Never   Lifestyle     Physical activity:     Days per week: Not on file     Minutes per session: Not on file     Stress: Not on file   Relationships     Social connections:     Talks on phone: Not on  "file     Gets together: Not on file     Attends Buddhism service: Not on file     Active member of club or organization: Not on file     Attends meetings of clubs or organizations: Not on file     Relationship status: Not on file     Intimate partner violence:     Fear of current or ex partner: Not on file     Emotionally abused: Not on file     Physically abused: Not on file     Forced sexual activity: Not on file   Other Topics Concern     Parent/sibling w/ CABG, MI or angioplasty before 65F 55M? Not Asked   Social History Narrative     Not on file         Patient's past medical, surgical, social, and family histories were reviewed today and no changes are noted.    REVIEW OF SYSTEMS:  10 point ROS is negative other than symptoms noted above in HPI, Past Medical History or as stated below  Constitutional: NEGATIVE for fever, chills, change in weight  Skin: NEGATIVE for worrisome rashes, moles or lesions  GI/: NEGATIVE for bowel or bladder changes  Neuro: NEGATIVE for weakness, dizziness or paresthesias    OBJECTIVE:  /62   Ht 1.854 m (6' 1\")   Wt 65.8 kg (145 lb)   LMP  (LMP Unknown)   BMI 19.13 kg/m      General: healthy, alert and in no distress  HEENT: no scleral icterus or conjunctival erythema  Skin: no suspicious lesions or rash. No jaundice.  CV: no pedal edema  Resp: normal respiratory effort without conversational dyspnea   Psych: normal mood and affect  Gait: normal steady gait with appropriate coordination and balance  Neuro: normal light touch sensory exam of the bilateral lower extremities.  DTR's 2+ patella and achilles bilaterally.  MSK:  THORACIC/LUMBAR SPINE  Inspection:    No gross deformity/asymmetry  Palpation:   remainder of landmarks are nontender.  Range of Motion:     Lumbar flexion within normal limits    Lumbar extension limited by tightness  Strength:    quadriceps 4+/5, hamstrings 4+/5, gastrocsoleus 5-/5, tibialis anterior 5-/5, extensor hallicus longus 5-/5  Special " Tests:    Positive: none    Negative: straight leg raise (bilateral), slump test (bilateral)      Independent visualization of the below image:  No results found for this or any previous visit (from the past 24 hour(s)).  XR LUMBAR SPINE 2-3 VIEWS  8/20/2015 4:53 PM     HISTORY:  Pain.     COMPARISON:  None.     IMPRESSION  IMPRESSION:  Lumbar scoliosis convex right with associated multilevel  degenerative changes. Disc space narrowing from L2-3 through L4-5,  most prominent at L4-5. Posterior alignment is satisfactory.     JERI SAEED MD    Patient's conditions were thoroughly discussed during today's visit with greater than 50% of the visit spent counseling the patient with total time spent face-to-face with the patient being 50 minutes.    Albert Yeo MD [facility]      Again, thank you for allowing me to participate in the care of your patient.        Sincerely,        Albert Yeo, MD

## 2019-08-05 NOTE — PATIENT INSTRUCTIONS
1. Lumbar degenerative disc disease    2. Lumbar spondylosis    3. Weakness of both legs      -Patient is here for bilateral leg and back weakness/fatigue  -Reviewed x-rays from 3 years ago as well as her previous physical therapy notes and notes from her neurologist  -Patient has chronic multilevel lumbar degenerative disc disease, mild scoliosis and arthritis.  -Patient has had physical therapy last year but has not continued with her home exercises.  -Patient also has increased fatigue from her anemia and medical conditions.  -Patient will restart physical therapy with Aura Dumont at [place] for comprehensive back and leg strengthening and improvements with balance.  -Patient had an EMG done at the [facility] a couple of years ago and so we will attempt to get the results to review at the next visit as well.  -Patient will follow-up in 6 weeks for reevaluation and progression of activity.  -Patient was also given a pamphlet for silver sneakers to begin at home fitness program when she is ready.  -Call direct clinic number [[phone]] at any time with questions or concerns.    Albert Yeo MD [facility]  [facility] [place]

## 2019-08-08 ENCOUNTER — OFFICE VISIT (OUTPATIENT)
Dept: INTERNAL MEDICINE | Facility: CLINIC | Age: 79
End: 2019-08-08
Payer: MEDICARE

## 2019-08-08 VITALS
WEIGHT: 144.3 LBS | DIASTOLIC BLOOD PRESSURE: 52 MMHG | SYSTOLIC BLOOD PRESSURE: 108 MMHG | HEIGHT: 72 IN | RESPIRATION RATE: 16 BRPM | HEART RATE: 80 BPM | BODY MASS INDEX: 19.54 KG/M2 | TEMPERATURE: 97.7 F | OXYGEN SATURATION: 98 %

## 2019-08-08 DIAGNOSIS — G47.10 HYPERSOMNOLENCE: ICD-10-CM

## 2019-08-08 DIAGNOSIS — R06.09 DYSPNEA ON EXERTION: ICD-10-CM

## 2019-08-08 DIAGNOSIS — R53.83 FATIGUE, UNSPECIFIED TYPE: Primary | ICD-10-CM

## 2019-08-08 DIAGNOSIS — D83.9 CVID (COMMON VARIABLE IMMUNODEFICIENCY) (H): ICD-10-CM

## 2019-08-08 DIAGNOSIS — D59.19 OTHER AUTOIMMUNE HEMOLYTIC ANEMIAS: ICD-10-CM

## 2019-08-08 DIAGNOSIS — L30.9 DERMATITIS: ICD-10-CM

## 2019-08-08 LAB — HGB BLD-MCNC: 9.2 G/DL (ref 11.7–15.7)

## 2019-08-08 PROCEDURE — 85018 HEMOGLOBIN: CPT | Performed by: INTERNAL MEDICINE

## 2019-08-08 PROCEDURE — 36415 COLL VENOUS BLD VENIPUNCTURE: CPT | Performed by: INTERNAL MEDICINE

## 2019-08-08 PROCEDURE — 99215 OFFICE O/P EST HI 40 MIN: CPT | Performed by: INTERNAL MEDICINE

## 2019-08-08 RX ORDER — FLUOCINOLONE ACETONIDE 0.1 MG/G
CREAM TOPICAL 2 TIMES DAILY
Qty: 15 G | Refills: 3 | Status: SHIPPED | OUTPATIENT
Start: 2019-08-08 | End: 2019-10-31

## 2019-08-08 RX ORDER — LEVOTHYROXINE SODIUM 150 UG/1
150 TABLET ORAL DAILY
Qty: 90 TABLET | Refills: 2 | Status: CANCELLED | OUTPATIENT
Start: 2019-08-08

## 2019-08-08 RX ORDER — FLUOCINOLONE ACETONIDE 0.11 MG/ML
OIL TOPICAL PRN
Status: CANCELLED | OUTPATIENT
Start: 2019-08-08

## 2019-08-08 RX ORDER — SULFAMETHOXAZOLE/TRIMETHOPRIM 800-160 MG
TABLET ORAL
Qty: 45 TABLET | Refills: 3 | Status: CANCELLED | OUTPATIENT
Start: 2019-08-08

## 2019-08-08 ASSESSMENT — MIFFLIN-ST. JEOR: SCORE: 1246.54

## 2019-08-08 NOTE — PROGRESS NOTES
Subjective:   Tricia Sosa is a 78 year old female who presents as a new patient to this clinic.     Previous care: [facility]  Current concerns:   Fatigue and dyspnea: She has complex medical history including immune deficiency, treated with regular Ig infusions.  She has a history of hemolytic anemia in the past which was treated and appeared to be in remission.  She is concerned that it may be recurring, her last hemoglobin was a little bit lower.  She feels some mild dyspnea on exertion with activity over the past few months.  She does not have PND, orthopnea, significant cough.  She also has been much more tired, even some hypersomnolence during the day.  She does feel she sleeps long enough hours, does not feel rested in the morning.  She does not really have a lot of disturbed sleep that she is aware of.    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, and posterior vitreous detachment     Urinary tract infection, site not specified     Recurrent UTI's       Patient Active Problem List   Diagnosis     Acquired hypothyroidism     CVID (common variable immunodeficiency) (H)     B12 deficiency     CARDIOVASCULAR SCREENING; LDL GOAL LESS THAN 130     Other autoimmune hemolytic anemias (H)     Right-sided low back pain with right-sided sciatica, unspecified chronicity     Weakness of both lower extremities     Current Outpatient Medications   Medication Sig Dispense Refill     cyanocobalamin (VITAMIN  B-12) 1000 MCG tablet   3     fluocinolone (SYNALAR) 0.01 % external cream Apply topically 2 times daily 15 g 3     folic acid (FOLVITE) 1 MG tablet   3     ketoconazole 1 % shampoo Use every 3 days as directed 200 mL 11     levothyroxine (SYNTHROID/LEVOTHROID) 150 MCG  tablet TAKE 1 TABLET BY MOUTH ONCE DAILY 90 tablet 2     sulfamethoxazole-trimethoprim (BACTRIM DS/SEPTRA DS) 800-160 MG per tablet Take 1 pill orally on Mon, Wed and Friday 45 tablet 3     ketoconazole (NIZORAL) 2 % external shampoo Use every other day to scalp as needed 120 mL 11      Social History     Tobacco Use     Smoking status: Never Smoker     Smokeless tobacco: Never Used   Substance Use Topics     Alcohol use: No     Alcohol/week: 0.0 oz     Frequency: Never     Drug use: No      Family History   Problem Relation Age of Onset     Colon Cancer Mother 90     Cerebrovascular Disease Father          at age 85     Dementia Brother      Dementia Brother      Pancreatic Cancer Brother      Breast Cancer Sister       Past Surgical History:   Procedure Laterality Date     C LIGATE FALLOPIAN TUBE       COLONOSCOPY N/A 2016    Procedure: COLONOSCOPY;  Surgeon: Dontae Del Rio MD;  Location:  GI      COLONOSCOPY W BIOPSY  00     HC COLONOSCOPY W BIOPSY  10/8/03     HC COLONOSCOPY W BIOPSY  05    REPEAT IN 5 YEARS      CYSTOSCOPY,INSERT URETERAL STENT      Ureteral stent insertion      FLEX SIGMOIDOSCOPY W BIOPSY  00      LAPAROSCOPY, SURGICAL; CHOLECYSTECTOMY        THYROIDECTOMY       LIGATION OF HEMORRHOID(S)      Hemorrhoidectomy     UPPER GI ENDOSCOPY,BIOPSY  10/01/01        ROS:   No fever, chills, night sweats, cough, palpitations, PND, orthopnea, chest pain, nausea, abdominal pain, diarrhea, constipation, blood in the stool, urinary symptoms, new edema, bleeding from gums or in the urine or stool    Objective:  Patient alert in NAD  /52   Pulse 80   Temp 97.7  F (36.5  C) (Oral)   Resp 16   Ht 1.829 m (6')   Wt 65.5 kg (144 lb 4.8 oz)   LMP  (LMP Unknown)   SpO2 98%   BMI 19.57 kg/m       Neck: No lymphadenopathy or thyromegaly  Lungs: Clear without wheezes, crackles  CV: normal S1, S2 without murmur, S3 or S4.   Abdomen: Bowel sounds normal, soft,  nontender. No hepatosplenomegaly. No masses.   No edema  No skin rashes    Lab Results   Component Value Date    TSH 2.81 10/16/2018    TSH 1.90 10/20/2017    TSH 1.82 06/19/2017    TSH 6.29 04/03/2017        ASSESSMENT:   (G47.10) Hypersomnolence  (primary encounter diagnosis)  Comment: It is possible she does have a sleep disorder and this could be contributing to dyspnea some degree because of conditioning.  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)  Comment: This seems to be clinically well controlled and no current evidence of infection  Plan: We will continue hematology follow-up    (D59.1) Other autoimmune hemolytic anemias (H)  Comment: We will recheck her hemoglobin which had been declining a little and then may need to contact her hematologist  Plan: Hemoglobin            (R53.83) Fatigue, unspecified type  Comment: Overall etiology is not clear, there are no specific symptoms to point to specific condition, could be cardiac, her last hemoglobin was a little bit lower though I am not sure that would necessarily cause significant symptoms but will recheck, may be a sleep disturbance, recommend echo, sleep evaluation, check labs  Plan: SLEEP EVALUATION & MANAGEMENT REFERRAL - [facility]          [phone] (Age 18 and up)            (R06.09) Dyspnea on exertion  Comment: Is not clear if this is related to the lower hemoglobin.  Her oxygen saturation is good and her lung exam is clear so less likely to be a pulmonary cause, suggest echocardiogram to assess her cardiac status as the next step.   Plan: Echocardiogram Complete            Emily Church MD  [facility]

## 2019-08-08 NOTE — NURSING NOTE
Vital signs:  Temp: 97.7  F (36.5  C) Temp src: Oral BP: 108/52 Pulse: 80   Resp: 16 SpO2: 98 %     Height: 182.9 cm (6') Weight: 65.5 kg (144 lb 4.8 oz)  Estimated body mass index is 19.57 kg/m  as calculated from the following:    Height as of this encounter: 1.829 m (6').    Weight as of this encounter: 65.5 kg (144 lb 4.8 oz).

## 2019-08-09 DIAGNOSIS — L30.9 DERMATITIS: Primary | ICD-10-CM

## 2019-08-09 RX ORDER — FLUOCINOLONE ACETONIDE 0.11 MG/ML
OIL TOPICAL
Status: CANCELLED | OUTPATIENT
Start: 2019-08-09

## 2019-08-09 NOTE — TELEPHONE ENCOUNTER
"Fax received from [employer]. Stating OTC Nizoral A-D 1% not covered, Rx Nizoral A-D 2% \"probably would be covered by her Part D insurance\" per Abhilash AV. Routed to CHELSEA De Guzman.  Montserrat Molina CMA    "

## 2019-08-12 NOTE — TELEPHONE ENCOUNTER
Please check with patient to find out if she was using the ketoconazole 2%  solution or not.  If not, then she will need to buy the over-the-counter  Nizoral shampoo.  I discontinued the fluocinonide scalp lotion and changed it to a cream.

## 2019-08-13 ENCOUNTER — TELEPHONE (OUTPATIENT)
Dept: INTERNAL MEDICINE | Facility: CLINIC | Age: 79
End: 2019-08-13

## 2019-08-13 RX ORDER — KETOCONAZOLE 20 MG/ML
SHAMPOO TOPICAL
Qty: 120 ML | Refills: 11 | Status: SHIPPED | OUTPATIENT
Start: 2019-08-13 | End: 2021-11-16

## 2019-08-13 NOTE — TELEPHONE ENCOUNTER
Call to patient. States she was using Ketoconazole 2% previously. Order pended. Please complete and sign if in agreement.

## 2019-08-13 NOTE — TELEPHONE ENCOUNTER
I saw Tricia as a new patient and she has felt more dyspnea and was concerned her hemolytic anemia was returning so we rechecked her hemoglobin which is dropping.   I am sending this to Dr. Summers, her hematologist: she is not supposed to see you until late September; would you want to see her sooner or do you have any recommendations? Thanks.

## 2019-08-14 ENCOUNTER — THERAPY VISIT (OUTPATIENT)
Dept: PHYSICAL THERAPY | Facility: CLINIC | Age: 79
End: 2019-08-14
Attending: FAMILY MEDICINE
Payer: MEDICARE

## 2019-08-14 DIAGNOSIS — M51.369 LUMBAR DEGENERATIVE DISC DISEASE: ICD-10-CM

## 2019-08-14 DIAGNOSIS — R29.898 WEAKNESS OF BOTH LOWER EXTREMITIES: ICD-10-CM

## 2019-08-14 DIAGNOSIS — M47.816 LUMBAR SPONDYLOSIS: ICD-10-CM

## 2019-08-14 DIAGNOSIS — R29.898 WEAKNESS OF BOTH LEGS: ICD-10-CM

## 2019-08-14 PROCEDURE — 97162 PT EVAL MOD COMPLEX 30 MIN: CPT | Mod: GP | Performed by: PHYSICAL THERAPIST

## 2019-08-14 PROCEDURE — 97530 THERAPEUTIC ACTIVITIES: CPT | Mod: GP | Performed by: PHYSICAL THERAPIST

## 2019-08-14 NOTE — PROGRESS NOTES
"[facility] Initial Evaluation -- Lumbar    Date: August 14, 2019  Tricia Sosa is a 78 year old female with a lumbar condition.   Referral: Dr. Yeo  Work mechanical stresses:  retired  Employment status:  retired  Leisure mechanical stresses: not able currently  Functional disability score (MARCO/STarT Back):  See flow sheet  VAS score (0-10): painfree, weakness is chief complaint  Patient goals/expectations:  \"to figure out why my legs are so weak\"    HISTORY:    Present symptoms: bilateral leg weakness  Pain quality (sharp/shooting/stabbing/aching/burning/cramping):  painfree   Paresthesia (yes/no):  Yes, bilateral feet (dx w/neuropathy)    Present since (onset date): May 2018.     Symptoms (improving/unchanging/worsening):  worsening.     Symptoms commenced as a result of: no apparent reason, ongoing health issues w/immune system compromise   Condition occurred in the following environment:        Symptoms at onset (back/thigh/leg): leg weakness  Constant symptoms (back/thigh/leg):   Intermittent symptoms (back/thigh/leg):     Symptoms are made worse with the following: Always Rising, Always Standing, Always Walking and Always On the move   Symptoms are made better with the following: Always Sitting and Always Lying    Disturbed sleep (yes/no):  no Sleeping postures (prone/sup/side R/L): n/a    Previous episodes (0/1-5/6-10/11+):  Year of first episode:     Previous history: chronic LBP  Previous treatments: PT      Specific Questions:  Cough/Sneeze/Strain (pos/neg): neg  Bowel/Bladder (normal/abnormal): normal  Gait (normal/abnormal): abnormal (has fallen a few times in last year, feels she can \"catch\" her foot)  Medications (nil/NSAIDS/analg/steroids/anticoag/other):  Other - Thyroid  Medical allergies:  Coxycycline  General health (excellent/good/fair/poor):  good  Pertinent medical history:  Concussions/Dizziness, Thyroid problems and CVID, neuropathy  Imaging (None/Xray/MRI/Other):  " none  Recent or major surgery (yes/no):  no  Night pain (yes/no): no  Accidents (yes/no): no  Unexplained weight loss (yes/no): none  Barriers at home: none  Other red flags: none    EXAMINATION    Posture:   Sitting (good/fair/poor): fair/poor  Standing (good/fair/poor):fair  Lordosis (red/acc/normal): decr  Correction of posture (better/worse/no effect): no effect    Lateral Shift (right/left/nil): nil  Relevant (yes/no):  no  Other Observations: LE MMT: flex=grossly 4/5, quads=4/5, R Ant TIb=4/5, L Ant TIb=4+/5,     Neurological:    Motor deficit:    Reflexes:    Sensory deficit:    Dural signs:      Movement Loss:   Luis Mod Min Nil Pain   Flexion    x    Extension  x      Side Gliding R  x x     Side Gliding L  x x       Test Movements:   During: produces, abolishes, increases, decreases, no effect, centralizing, peripheralizing   After: better, worse, no better, no worse, no effect, centralized, peripheralized    Pretest symptoms standing: symptom free   Symptoms During Symptoms After ROM increased ROM decreased No Effect   FIS        Rep FIS        EIS Produces    No Worse         Rep EIS Produces    No Worse    X, NE leg strength     Pretest symptoms lying:     Symptoms During Symptoms After ROM increased ROM decreased No Effect   SALLY        Rep SALLY        EIL        Rep EIL        If required, pretest symptoms:    Symptoms During Symptoms After ROM increased ROM decreased No Effect   SGIS - R        Rep SGIS - R        SGIS - L        Rep SGIS - L          Static Tests:  Sitting slouched:    Sitting erect:    Standing slouched   Standing erect:    Lying prone in extension:  NE/NE/incr EXT, NE leg strength Long sitting:      Other Tests:     Provisional Classification:  Inconclusive/Other - general LE weakness    Principle of Management:  Education:  Posture, therapeutic dose of exercise,    Equipment provided:  Reviewed use of lumbar roll  Mechanical therapy (Y/N):  Y?   Extension principle:  Rep EIS x 10/2  hrs  Lateral Principle:    Flexion principle:    Other:      ASSESSMENT/PLAN:    Patient is a 78 year old female with lumbar complaints.    Patient has the following significant findings with corresponding treatment plan.                Diagnosis 1:  LE weakness/spondylosis  Pain -  manual therapy, self management, education, directional preference exercise and home program  Decreased ROM/flexibility - manual therapy and therapeutic exercise  Decreased strength - therapeutic exercise and therapeutic activities  Decreased function - therapeutic activities  Impaired posture - neuro re-education    Therapy Evaluation Codes:   1) History comprised of:   Personal factors that impact the plan of care:      Anxiety.    Comorbidity factors that impact the plan of care are:      Dizziness, Weakness and CVID, thyroid.     Medications impacting care: thyroid.  2) Examination of Body Systems comprised of:   Body structures and functions that impact the plan of care:      Lumbar spine and lower extremities.   Activity limitations that impact the plan of care are:      Squatting/kneeling, Stairs, Standing and Walking.  3) Clinical presentation characteristics are:   Evolving/Changing.  4) Decision-Making    Moderate complexity using standardized patient assessment instrument and/or measureable assessment of functional outcome.  Cumulative Therapy Evaluation is: Moderate complexity.    Previous and current functional limitations:  (See Goal Flow Sheet for this information)    Short term and Long term goals: (See Goal Flow Sheet for this information)     Communication ability:  Patient appears to be able to clearly communicate and understand verbal and written communication and follow directions correctly.  Treatment Explanation - The following has been discussed with the patient:   RX ordered/plan of care  Anticipated outcomes  Possible risks and side effects  This patient would benefit from PT intervention to resume normal  activities.   Rehab potential is good.    Frequency:  1 X week, once daily  Duration:  for 6 weeks  Discharge Plan:  Achieve all LTG.  Independent in home treatment program.  Reach maximal therapeutic benefit.    Please refer to the daily flowsheet for treatment today, total treatment time and time spent performing 1:1 timed codes.

## 2019-08-14 NOTE — LETTER
"DEPARTMENT OF HEALTH AND HUMAN SERVICES  [facility]    PLAN/UPDATED PLAN OF PROGRESS FOR OUTPATIENT REHABILITATION    PATIENTS NAME:  Tricia Sosa   : 1940  PROVIDER NUMBER:    [id number]  HICN:  [id number]  PROVIDER NAME: BRENDA LAZARO BAXTER PT  MEDICAL RECORD NUMBER: [id number]   START OF CARE DATE:  19   TYPE:  PT  PRIMARY/TREATMENT DIAGNOSIS:Lumbar degenerative disc disease  Lumbar spondylosis  Weakness of both legs  Weakness of both lower extremities  VISITS FROM START OF CARE:  Rxs Used: 1     [facility] Initial Evaluation -- Lumbar    Date: 2019  Tricia Sosa is a 78 year old female with a lumbar condition.   Referral: Dr. Yeo  Work mechanical stresses:  retired  Employment status:  retired  Leisure mechanical stresses: not able currently  Functional disability score (MARCO/STarT Back):  See flow sheet  VAS score (0-10): painfree, weakness is chief complaint  Patient goals/expectations:  \"to figure out why my legs are so weak\"    HISTORY:    Present symptoms: bilateral leg weakness  Pain quality (sharp/shooting/stabbing/aching/burning/cramping):  painfree   Paresthesia (yes/no):  Yes, bilateral feet (dx w/neuropathy)    Present since (onset date): May 2018.     Symptoms (improving/unchanging/worsening):  worsening.     Symptoms commenced as a result of: no apparent reason, ongoing health issues w/immune system compromise   Condition occurred in the following environment:        Symptoms at onset (back/thigh/leg): leg weakness  Constant symptoms (back/thigh/leg):   Intermittent symptoms (back/thigh/leg):     Symptoms are made worse with the following: Always Rising, Always Standing, Always Walking and Always On the move   Symptoms are made better with the following: Always Sitting and Always Lying    Disturbed sleep (yes/no):  no Sleeping postures (prone/sup/side R/L): n/a    Previous episodes (0/1-5/6-10/11+):  Year of first episode: " "  PATIENTS NAME:  Tricia Sosa   : 1940      Previous history: chronic LBP  Previous treatments: PT      Specific Questions:  Cough/Sneeze/Strain (pos/neg): neg  Bowel/Bladder (normal/abnormal): normal  Gait (normal/abnormal): abnormal (has fallen a few times in last year, feels she can \"catch\" her foot)  Medications (nil/NSAIDS/analg/steroids/anticoag/other):  Other - Thyroid  Medical allergies:  Coxycycline  General health (excellent/good/fair/poor):  good  Pertinent medical history:  Concussions/Dizziness, Thyroid problems and CVID, neuropathy  Imaging (None/Xray/MRI/Other):  none  Recent or major surgery (yes/no):  no  Night pain (yes/no): no  Accidents (yes/no): no  Unexplained weight loss (yes/no): none  Barriers at home: none  Other red flags: none    EXAMINATION    Posture:   Sitting (good/fair/poor): fair/poor  Standing (good/fair/poor):fair  Lordosis (red/acc/normal): decr  Correction of posture (better/worse/no effect): no effect    Lateral Shift (right/left/nil): nil  Relevant (yes/no):  no  Other Observations: LE MMT: flex=grossly 4/5, quads=4/5, R Ant TIb=4/5, L Ant TIb=4+/5,     Neurological:    Motor deficit:    Reflexes:    Sensory deficit:    Dural signs:      Movement Loss:   Luis Mod Min Nil Pain   Flexion    x    Extension  x      Side Gliding R  x x     Side Gliding L  x x       Test Movements:     PATIENTS NAME:  Tricia Sosa   : 1940      During: produces, abolishes, increases, decreases, no effect, centralizing, peripheralizing   After: better, worse, no better, no worse, no effect, centralized, peripheralized    Pretest symptoms standing: symptom free   Symptoms During Symptoms After ROM increased ROM decreased No Effect   FIS        Rep FIS        EIS Produces    No Worse         Rep EIS Produces    No Worse    X, NE leg strength     Pretest symptoms lying:     Symptoms During Symptoms After ROM increased ROM decreased No Effect   SALLY        Rep SALLY        EIL        Rep " EIL        If required, pretest symptoms:    Symptoms During Symptoms After ROM increased ROM decreased No Effect   SGIS - R        Rep SGIS - R        SGIS - L        Rep SGIS - L          Static Tests:  Sitting slouched:    Sitting erect:    Standing slouched   Standing erect:    Lying prone in extension:  NE/NE/incr EXT, NE leg strength Long sitting:      Other Tests:     Provisional Classification:  Inconclusive/Other - general LE weakness    Principle of Management:  Education:  Posture, therapeutic dose of exercise,    Equipment provided:  Reviewed use of lumbar roll  Mechanical therapy (Y/N):  Y?   Extension principle:  Rep EIS x 10/2 hrs  Lateral Principle:    Flexion principle:    Other:              PATIENTS NAME:  Tricia Sosa   : 1940          ASSESSMENT/PLAN:    Patient is a 78 year old female with lumbar complaints.    Patient has the following significant findings with corresponding treatment plan.                Diagnosis 1:  LE weakness/spondylosis  Pain -  manual therapy, self management, education, directional preference exercise and home program  Decreased ROM/flexibility - manual therapy and therapeutic exercise  Decreased strength - therapeutic exercise and therapeutic activities  Decreased function - therapeutic activities  Impaired posture - neuro re-education    Therapy Evaluation Codes:   1) History comprised of:   Personal factors that impact the plan of care:      Anxiety.    Comorbidity factors that impact the plan of care are:      Dizziness, Weakness and CVID, thyroid.     Medications impacting care: thyroid.  2) Examination of Body Systems comprised of:   Body structures and functions that impact the plan of care:      Lumbar spine and lower extremities.   Activity limitations that impact the plan of care are:      Squatting/kneeling, Stairs, Standing and Walking.  3) Clinical presentation characteristics are:   Evolving/Changing.  4) Decision-Making    Moderate complexity  "using standardized patient assessment instrument and/or measureable assessment of functional outcome.  Cumulative Therapy Evaluation is: Moderate complexity.    Previous and current functional limitations:  (See Goal Flow Sheet for this information)    Short term and Long term goals: (See Goal Flow Sheet for this information)     Communication ability:  Patient appears to be able to clearly communicate and understand verbal and written communication and follow directions correctly.  Treatment Explanation - The following has been discussed with the patient:   RX ordered/plan of care  Anticipated outcomes  Possible risks and side effects  This patient would benefit from PT intervention to resume normal activities.   Rehab potential is good.                PATIENTS NAME:  Tricia Sosa   : 1940      Frequency:  1 X week, once daily  Duration:  for 6 weeks  Discharge Plan:  Achieve all LTG.  Independent in home treatment program.  Reach maximal therapeutic benefit.        Caregiver Signature/Credentials _____________________________ Date ________       Treating Provider: Aura Dumont PT, Cert.MDT     I have reviewed and certified the need for these services and plan of treatment while under my care.        PHYSICIAN'S SIGNATURE:   __________________________________ Date___________     Albert Yeo MD    Certification period:  Beginning of Cert date period: 19 to  End of Cert period date: 19     Functional Level Progress Report: Please see attached \"Goal Flow sheet for Functional level.\"    ____X____ Continue Services or       ________ DC Services                Service dates: From  SOC Date: 19 date to present                         "

## 2019-08-15 ENCOUNTER — PATIENT OUTREACH (OUTPATIENT)
Dept: ONCOLOGY | Facility: CLINIC | Age: 79
End: 2019-08-15

## 2019-08-15 ENCOUNTER — TELEPHONE (OUTPATIENT)
Dept: FAMILY MEDICINE | Facility: CLINIC | Age: 79
End: 2019-08-15

## 2019-08-15 DIAGNOSIS — R06.00 DYSPNEA, UNSPECIFIED TYPE: Primary | ICD-10-CM

## 2019-08-15 PROBLEM — R29.898 WEAKNESS OF BOTH LOWER EXTREMITIES: Status: ACTIVE | Noted: 2019-08-15

## 2019-08-15 NOTE — PROGRESS NOTES
S-(situation): Saw PCP, anemia. Dr. Church tested hemoglobin at 9.2. Complaints of short of breath and weak. Pt has had a history of hemoglobin in 8's range. Dr. Church sent communication to Dr. Summers but not received response. Pt called and informed Dr. Summers is out of the country until 8/20/2019.    B-(background): Hemolytic anemia-autoimmune, IgA deficiency, Common Variable Immunodeficiency (CVID). Last IVIG infusion 7/23/2019.    A-(assessment): Pt states feeling short of breath and weak. Pt states this has been long term symptoms but feeling worse recently. Pt denies ever having blood transfusions. She has been treated with IVIG infusions. Dr. Summers's last note he discussed Rituximab infusions every week for 4 weeks if hemoglobin continues to drop. Plan to follow-up in 2 months. Pt is scheduled 9/20/2019 with Dr. Summers. Pt denies history of smoking but states she has had pneumonai many times.     RNCC asked Pt if she has ever been seen by Pulmonologist. Pt said last in about 2015 at [facility] but not since. Pt adds maybe it would be good to return to Pulmonologist. Writer asked if Pt has nebulizer or inhalers. Pt states not for years and doesn't recall why she was taking them. Writer then offered critical thinking in a visit with Pulmonologist may offer greater insight with PFT testing and lung scans to determine a disease process like scarring, emphysema or COPD as differential causes related to shortness of breath. Pt agrees this would be helpful. PT asked about local Pulmonologist. Writer informed Pt Kaleigh Mendes MD is Pulmonologist in this clinic.     Pt states she is scheduled for EKG at the end of August.     R-(recommendations): RNCC spoke to Dr. Mendes's nurse Anabell regarding new Pt consults. It is recommended a referral from PCP or Dr. Summers then Pt could schedule. Next available Pulmonology appointment is 9/9/2019. Pt informed of this information. Pt states she does not want to go to the [facility], does not want to  drive there. Pt states she will contact her PCP Dr. Church to request referral. Writer will discuss Pt concerns with Dr. Summers when he returns back in the [place] on 8/20/2019. Pt agrees with plan.  Pt instructed to return call if any questions or concerns arise in the interim. All questions answered to satisfaction.     KALI WestbrookN, RN, OCN  RN Cancer Care Coordinator  [facility]  [phone]

## 2019-08-20 ENCOUNTER — INFUSION THERAPY VISIT (OUTPATIENT)
Dept: INFUSION THERAPY | Facility: CLINIC | Age: 79
End: 2019-08-20
Attending: INTERNAL MEDICINE
Payer: MEDICARE

## 2019-08-20 ENCOUNTER — PATIENT OUTREACH (OUTPATIENT)
Dept: ONCOLOGY | Facility: CLINIC | Age: 79
End: 2019-08-20

## 2019-08-20 ENCOUNTER — HOSPITAL ENCOUNTER (OUTPATIENT)
Facility: CLINIC | Age: 79
Setting detail: SPECIMEN
Discharge: HOME | End: 2019-08-20
Attending: INTERNAL MEDICINE | Admitting: INTERNAL MEDICINE
Payer: MEDICARE

## 2019-08-20 VITALS
BODY MASS INDEX: 19.77 KG/M2 | RESPIRATION RATE: 16 BRPM | DIASTOLIC BLOOD PRESSURE: 60 MMHG | SYSTOLIC BLOOD PRESSURE: 114 MMHG | OXYGEN SATURATION: 96 % | HEART RATE: 78 BPM | TEMPERATURE: 98.5 F | WEIGHT: 145.8 LBS

## 2019-08-20 DIAGNOSIS — D59.19 OTHER AUTOIMMUNE HEMOLYTIC ANEMIAS: Primary | ICD-10-CM

## 2019-08-20 DIAGNOSIS — D83.9 CVID (COMMON VARIABLE IMMUNODEFICIENCY) (H): ICD-10-CM

## 2019-08-20 DIAGNOSIS — E53.8 B12 DEFICIENCY: ICD-10-CM

## 2019-08-20 LAB
ALBUMIN SERPL-MCNC: 3.6 G/DL (ref 3.4–5)
ALP SERPL-CCNC: 107 U/L (ref 40–150)
ALT SERPL W P-5'-P-CCNC: 41 U/L (ref 0–50)
ANION GAP SERPL CALCULATED.3IONS-SCNC: 4 MMOL/L (ref 3–14)
ANISOCYTOSIS BLD QL SMEAR: ABNORMAL
AST SERPL W P-5'-P-CCNC: 143 U/L (ref 0–45)
BASO STIPL BLD QL SMEAR: PRESENT
BASOPHILS # BLD AUTO: 0 10E9/L (ref 0–0.2)
BASOPHILS NFR BLD AUTO: 0 %
BILIRUB SERPL-MCNC: 2.5 MG/DL (ref 0.2–1.3)
BUN SERPL-MCNC: 16 MG/DL (ref 7–30)
CALCIUM SERPL-MCNC: 8.2 MG/DL (ref 8.5–10.1)
CHLORIDE SERPL-SCNC: 106 MMOL/L (ref 94–109)
CO2 SERPL-SCNC: 26 MMOL/L (ref 20–32)
CREAT SERPL-MCNC: 0.72 MG/DL (ref 0.52–1.04)
DIFFERENTIAL METHOD BLD: ABNORMAL
EOSINOPHIL # BLD AUTO: 0 10E9/L (ref 0–0.7)
EOSINOPHIL NFR BLD AUTO: 0 %
ERYTHROCYTE [DISTWIDTH] IN BLOOD BY AUTOMATED COUNT: 19.4 % (ref 10–15)
GFR SERPL CREATININE-BSD FRML MDRD: 79 ML/MIN/{1.73_M2}
GLUCOSE SERPL-MCNC: 75 MG/DL (ref 70–99)
HCT VFR BLD AUTO: 23.2 % (ref 35–47)
HGB BLD-MCNC: 7.7 G/DL (ref 11.7–15.7)
HYPOCHROMIA BLD QL: PRESENT
LDH SERPL L TO P-CCNC: 1375 U/L (ref 81–234)
LYMPHOCYTES # BLD AUTO: 1.1 10E9/L (ref 0.8–5.3)
LYMPHOCYTES NFR BLD AUTO: 19 %
MACROCYTES BLD QL SMEAR: PRESENT
MCH RBC QN AUTO: 36.5 PG (ref 26.5–33)
MCHC RBC AUTO-ENTMCNC: 33.2 G/DL (ref 31.5–36.5)
MCV RBC AUTO: 110 FL (ref 78–100)
METAMYELOCYTES # BLD: 0.1 10E9/L
METAMYELOCYTES NFR BLD MANUAL: 1 %
MICROCYTES BLD QL SMEAR: PRESENT
MONOCYTES # BLD AUTO: 0.8 10E9/L (ref 0–1.3)
MONOCYTES NFR BLD AUTO: 14 %
NEUTROPHILS # BLD AUTO: 3.7 10E9/L (ref 1.6–8.3)
NEUTROPHILS NFR BLD AUTO: 66 %
NRBC # BLD AUTO: 0.1 10*3/UL
NRBC BLD AUTO-RTO: 1 /100
PLATELET # BLD AUTO: 61 10E9/L (ref 150–450)
POLYCHROMASIA BLD QL SMEAR: SLIGHT
POTASSIUM SERPL-SCNC: 4.4 MMOL/L (ref 3.4–5.3)
PROT SERPL-MCNC: 6.2 G/DL (ref 6.8–8.8)
RBC # BLD AUTO: 2.11 10E12/L (ref 3.8–5.2)
RETICS # AUTO: 227.9 10E9/L (ref 25–95)
RETICS/RBC NFR AUTO: 10.8 % (ref 0.5–2)
SODIUM SERPL-SCNC: 136 MMOL/L (ref 133–144)
WBC # BLD AUTO: 5.6 10E9/L (ref 4–11)

## 2019-08-20 PROCEDURE — 85025 COMPLETE CBC W/AUTO DIFF WBC: CPT | Performed by: INTERNAL MEDICINE

## 2019-08-20 PROCEDURE — 80053 COMPREHEN METABOLIC PANEL: CPT | Performed by: INTERNAL MEDICINE

## 2019-08-20 PROCEDURE — 82607 VITAMIN B-12: CPT | Performed by: INTERNAL MEDICINE

## 2019-08-20 PROCEDURE — 96366 THER/PROPH/DIAG IV INF ADDON: CPT

## 2019-08-20 PROCEDURE — 82784 ASSAY IGA/IGD/IGG/IGM EACH: CPT | Performed by: INTERNAL MEDICINE

## 2019-08-20 PROCEDURE — 85045 AUTOMATED RETICULOCYTE COUNT: CPT | Performed by: INTERNAL MEDICINE

## 2019-08-20 PROCEDURE — 25000128 H RX IP 250 OP 636: Performed by: INTERNAL MEDICINE

## 2019-08-20 PROCEDURE — 96375 TX/PRO/DX INJ NEW DRUG ADDON: CPT

## 2019-08-20 PROCEDURE — 83010 ASSAY OF HAPTOGLOBIN QUANT: CPT | Performed by: INTERNAL MEDICINE

## 2019-08-20 PROCEDURE — 96365 THER/PROPH/DIAG IV INF INIT: CPT

## 2019-08-20 PROCEDURE — 83615 LACTATE (LD) (LDH) ENZYME: CPT | Performed by: INTERNAL MEDICINE

## 2019-08-20 RX ORDER — HEPARIN SODIUM (PORCINE) LOCK FLUSH IV SOLN 100 UNIT/ML 100 UNIT/ML
5 SOLUTION INTRAVENOUS
Status: CANCELLED | OUTPATIENT
Start: 2019-08-20

## 2019-08-20 RX ORDER — HEPARIN SODIUM,PORCINE 10 UNIT/ML
5 VIAL (ML) INTRAVENOUS
Status: CANCELLED | OUTPATIENT
Start: 2019-08-20

## 2019-08-20 RX ORDER — ACETAMINOPHEN 325 MG/1
650 TABLET ORAL ONCE
Status: CANCELLED
Start: 2019-08-20

## 2019-08-20 RX ORDER — DIPHENHYDRAMINE HCL 25 MG
50 CAPSULE ORAL ONCE
Status: CANCELLED | OUTPATIENT
Start: 2019-08-20

## 2019-08-20 RX ADMIN — HUMAN IMMUNOGLOBULIN G 35 G: 20 LIQUID INTRAVENOUS at 11:53

## 2019-08-20 RX ADMIN — SODIUM CHLORIDE 250 ML: 9 INJECTION, SOLUTION INTRAVENOUS at 11:45

## 2019-08-20 RX ADMIN — HYDROCORTISONE SODIUM SUCCINATE 100 MG: 100 INJECTION, POWDER, FOR SOLUTION INTRAMUSCULAR; INTRAVENOUS at 11:45

## 2019-08-20 ASSESSMENT — PAIN SCALES - GENERAL: PAINLEVEL: NO PAIN (0)

## 2019-08-20 NOTE — PROGRESS NOTES
"Infusion Nursing Note:  Tricia Sosa presents today for IVIG, Labs.    Patient seen by provider today: No   present during visit today: Not Applicable.    Note: IVIG started at a rate of 0.5 mL/kg/hr and increased by 1 mL/kg/hr to a max rate of 4 mL/kg/hr.    Message left with Dr Summers's nurse Asha to discuss Hgb with Dr Summers- he is in with a patient. Tricia would like a phone call today re: what will be done about Hgb 7.7. She is having ongoing significant fatigue and dyspnea with activity (she states not new for her, and that \"they haven't figured out why\"). Denies bleeding. States she has not had blood transfusions or iron infusions in the past. OK to leave message if no answer.    Intravenous Access:  Peripheral IV placed.    Treatment Conditions:  Biological Infusion Checklist:  ~~~ NOTE: If the patient answers yes to any of the questions below, hold the infusion and contact ordering provider or on-call provider.    1. Have you recently had an elevated temperature, fever, chills, productive cough, coughing for 3 weeks or longer or hemoptysis, abnormal vital signs, night sweats,  chest pain or have you noticed a decrease in your appetite, unexplained weight loss or fatigue? No  2. Do you have any open wounds or new incisions? No  3. Do you have any recent or upcoming hospitalizations, surgeries or dental procedures? No  4. Do you currently have or recently have had any signs of illness or infection or are you on any antibiotics? No  5. Have you had any new, sudden or worsening abdominal pain? No  6. Have you or anyone in your household received a live vaccination in the past 4 weeks? Please note:  No live vaccines while on biologic/chemotherapy until 6 months after the last treatment.  Patient can receive the flu vaccine (shot only) and the pneumovax.  It is optimal for the patient to get these vaccines mid cycle, but they can be given at any time as long as it is not on the day of the infusion. " No  7. Have you recently been diagnosed with any new nervous system diseases (ie. Multiple sclerosis, Guillain Nome, seizures, neurological changes) or cancer diagnosis? No  8. Are you on any form of radiation or chemotherapy? No  9. Are you pregnant or breast feeding or do you have plans of pregnancy in the future? No  10. Have you been having any signs of worsening depression or suicidal ideations?  (benlysta only) No  11. Have there been any other new onset medical symptoms? No    Post Infusion Assessment:  Patient tolerated infusion without incident.  Blood return noted pre and post infusion.  Site patent and intact, free from redness, edema or discomfort.  No evidence of extravasations.  Access discontinued per protocol.  Biologic Infusion Post Education: Call the triage nurse at your clinic or seek medical attention if you have chills and/or temperature greater than or equal to 100.5, uncontrolled nausea/vomiting, diarrhea, constipation, dizziness, shortness of breath, chest pain, heart palpitations, weakness or any other new or concerning symptoms, questions or concerns.  You cannot have any live virus vaccines prior to or during treatment or up to 6 months post infusion.  If you have an upcoming surgery, medical procedure or dental procedure during treatment, this should be discussed with your ordering physician and your surgeon/dentist.  If you are having any concerning symptom, if you are unsure if you should get your next infusion or wish to speak to a provider before your next infusion, please call your care coordinator or triage nurse at your clinic to notify them so we can adequately serve you.     Discharge Plan:   Discharge instructions reviewed with: Patient.  Patient and/or family verbalized understanding of discharge instructions and all questions answered.  AVS to patient via [employer].  Patient will return 9/20 for f/u and infusion for next appointment.   Patient discharged in stable condition  accompanied by: self.  Departure Mode: Ambulatory with cane.    Fadia Brown, RN, RN

## 2019-08-20 NOTE — PROGRESS NOTES
S-(situation): Pt seen by Dr. Summers for CVID.    B-(background): Anemia, CVID, B-12 deficiency, IgA deficiency.    A-(assessment): Pt states feeling weak, short of breath and fatigued. Pt states she is unable to do anything.     R-(recommendations): Dr. Summers recommends Pt come in 8/22 to have CBC, Type And Screen for blood transfusion on 8/23/2019. Dr. Summers then recommends Pt have Rituximab infusion weekly times 4. Pt states she will do anything to feel better because the past month has been terrible.   Writer will contact Pt tomorrow when appointments are coordinated. Pt agrees with plan. No further questions or concerns at this time.     KALI WestbrookN, RN, OCN  RN Cancer Care Coordinator  [facility]  [phone]

## 2019-08-21 DIAGNOSIS — D59.19 OTHER AUTOIMMUNE HEMOLYTIC ANEMIAS: ICD-10-CM

## 2019-08-21 DIAGNOSIS — D83.9 CVID (COMMON VARIABLE IMMUNODEFICIENCY) (H): ICD-10-CM

## 2019-08-21 DIAGNOSIS — D83.9 CVID (COMMON VARIABLE IMMUNODEFICIENCY) (H): Primary | ICD-10-CM

## 2019-08-21 LAB
HAPTOGLOB SERPL-MCNC: <6 MG/DL (ref 35–175)
IGA SERPL-MCNC: <7 MG/DL (ref 70–380)
IGG SERPL-MCNC: 717 MG/DL (ref 695–1620)
IGM SERPL-MCNC: 370 MG/DL (ref 60–265)
VIT B12 SERPL-MCNC: 494 PG/ML (ref 193–986)

## 2019-08-21 RX ORDER — ALBUTEROL SULFATE 0.83 MG/ML
2.5 SOLUTION RESPIRATORY (INHALATION)
Status: CANCELLED | OUTPATIENT
Start: 2019-09-18

## 2019-08-21 RX ORDER — ALBUTEROL SULFATE 90 UG/1
1-2 AEROSOL, METERED RESPIRATORY (INHALATION)
Status: CANCELLED
Start: 2019-09-18

## 2019-08-21 RX ORDER — HEPARIN SODIUM (PORCINE) LOCK FLUSH IV SOLN 100 UNIT/ML 100 UNIT/ML
5 SOLUTION INTRAVENOUS
Status: CANCELLED | OUTPATIENT
Start: 2019-08-21

## 2019-08-21 RX ORDER — NALOXONE HYDROCHLORIDE 0.4 MG/ML
.1-.4 INJECTION, SOLUTION INTRAMUSCULAR; INTRAVENOUS; SUBCUTANEOUS
Status: CANCELLED | OUTPATIENT
Start: 2019-09-11

## 2019-08-21 RX ORDER — EPINEPHRINE 1 MG/ML
0.3 INJECTION, SOLUTION INTRAMUSCULAR; SUBCUTANEOUS EVERY 5 MIN PRN
Status: CANCELLED | OUTPATIENT
Start: 2019-09-04

## 2019-08-21 RX ORDER — ACETAMINOPHEN 325 MG/1
650 TABLET ORAL ONCE
Status: CANCELLED
Start: 2019-08-28

## 2019-08-21 RX ORDER — MEPERIDINE HYDROCHLORIDE 25 MG/ML
25 INJECTION INTRAMUSCULAR; INTRAVENOUS; SUBCUTANEOUS
Status: CANCELLED
Start: 2019-09-11

## 2019-08-21 RX ORDER — SODIUM CHLORIDE 9 MG/ML
1000 INJECTION, SOLUTION INTRAVENOUS CONTINUOUS PRN
Status: CANCELLED
Start: 2019-09-18

## 2019-08-21 RX ORDER — EPINEPHRINE 1 MG/ML
0.3 INJECTION, SOLUTION INTRAMUSCULAR; SUBCUTANEOUS EVERY 5 MIN PRN
Status: CANCELLED | OUTPATIENT
Start: 2019-09-11

## 2019-08-21 RX ORDER — ALBUTEROL SULFATE 90 UG/1
1-2 AEROSOL, METERED RESPIRATORY (INHALATION)
Status: CANCELLED
Start: 2019-09-11

## 2019-08-21 RX ORDER — METHYLPREDNISOLONE SODIUM SUCCINATE 125 MG/2ML
125 INJECTION, POWDER, LYOPHILIZED, FOR SOLUTION INTRAMUSCULAR; INTRAVENOUS
Status: CANCELLED
Start: 2019-08-28

## 2019-08-21 RX ORDER — ACETAMINOPHEN 325 MG/1
650 TABLET ORAL ONCE
Status: CANCELLED
Start: 2019-09-04

## 2019-08-21 RX ORDER — DIPHENHYDRAMINE HCL 25 MG
50 CAPSULE ORAL ONCE
Status: CANCELLED
Start: 2019-09-11

## 2019-08-21 RX ORDER — METHYLPREDNISOLONE SODIUM SUCCINATE 125 MG/2ML
125 INJECTION, POWDER, LYOPHILIZED, FOR SOLUTION INTRAMUSCULAR; INTRAVENOUS
Status: CANCELLED
Start: 2019-09-11

## 2019-08-21 RX ORDER — METHYLPREDNISOLONE SODIUM SUCCINATE 125 MG/2ML
125 INJECTION, POWDER, LYOPHILIZED, FOR SOLUTION INTRAMUSCULAR; INTRAVENOUS
Status: CANCELLED
Start: 2019-09-04

## 2019-08-21 RX ORDER — ALBUTEROL SULFATE 0.83 MG/ML
2.5 SOLUTION RESPIRATORY (INHALATION)
Status: CANCELLED | OUTPATIENT
Start: 2019-08-28

## 2019-08-21 RX ORDER — EPINEPHRINE 0.3 MG/.3ML
0.3 INJECTION SUBCUTANEOUS EVERY 5 MIN PRN
Status: CANCELLED | OUTPATIENT
Start: 2019-09-18

## 2019-08-21 RX ORDER — MEPERIDINE HYDROCHLORIDE 25 MG/ML
25 INJECTION INTRAMUSCULAR; INTRAVENOUS; SUBCUTANEOUS
Status: CANCELLED
Start: 2019-09-04

## 2019-08-21 RX ORDER — METHYLPREDNISOLONE SODIUM SUCCINATE 125 MG/2ML
125 INJECTION, POWDER, LYOPHILIZED, FOR SOLUTION INTRAMUSCULAR; INTRAVENOUS
Status: CANCELLED
Start: 2019-09-18

## 2019-08-21 RX ORDER — MEPERIDINE HYDROCHLORIDE 25 MG/ML
25 INJECTION INTRAMUSCULAR; INTRAVENOUS; SUBCUTANEOUS EVERY 30 MIN PRN
Status: CANCELLED | OUTPATIENT
Start: 2019-09-11

## 2019-08-21 RX ORDER — SODIUM CHLORIDE 9 MG/ML
1000 INJECTION, SOLUTION INTRAVENOUS CONTINUOUS PRN
Status: CANCELLED
Start: 2019-08-28

## 2019-08-21 RX ORDER — NALOXONE HYDROCHLORIDE 0.4 MG/ML
.1-.4 INJECTION, SOLUTION INTRAMUSCULAR; INTRAVENOUS; SUBCUTANEOUS
Status: CANCELLED | OUTPATIENT
Start: 2019-09-18

## 2019-08-21 RX ORDER — LORAZEPAM 2 MG/ML
0.5 INJECTION INTRAMUSCULAR EVERY 4 HOURS PRN
Status: CANCELLED
Start: 2019-09-11

## 2019-08-21 RX ORDER — DIPHENHYDRAMINE HYDROCHLORIDE 50 MG/ML
50 INJECTION INTRAMUSCULAR; INTRAVENOUS
Status: CANCELLED
Start: 2019-08-28

## 2019-08-21 RX ORDER — MEPERIDINE HYDROCHLORIDE 25 MG/ML
25 INJECTION INTRAMUSCULAR; INTRAVENOUS; SUBCUTANEOUS
Status: CANCELLED
Start: 2019-08-28

## 2019-08-21 RX ORDER — EPINEPHRINE 1 MG/ML
0.3 INJECTION, SOLUTION INTRAMUSCULAR; SUBCUTANEOUS EVERY 5 MIN PRN
Status: CANCELLED | OUTPATIENT
Start: 2019-09-18

## 2019-08-21 RX ORDER — EPINEPHRINE 0.3 MG/.3ML
0.3 INJECTION SUBCUTANEOUS EVERY 5 MIN PRN
Status: CANCELLED | OUTPATIENT
Start: 2019-09-04

## 2019-08-21 RX ORDER — ACETAMINOPHEN 325 MG/1
650 TABLET ORAL ONCE
Status: CANCELLED
Start: 2019-09-11

## 2019-08-21 RX ORDER — DIPHENHYDRAMINE HCL 25 MG
50 CAPSULE ORAL ONCE
Status: CANCELLED
Start: 2019-09-04

## 2019-08-21 RX ORDER — LORAZEPAM 2 MG/ML
0.5 INJECTION INTRAMUSCULAR EVERY 4 HOURS PRN
Status: CANCELLED
Start: 2019-09-18

## 2019-08-21 RX ORDER — SODIUM CHLORIDE 9 MG/ML
1000 INJECTION, SOLUTION INTRAVENOUS CONTINUOUS PRN
Status: CANCELLED
Start: 2019-09-04

## 2019-08-21 RX ORDER — DIPHENHYDRAMINE HCL 25 MG
50 CAPSULE ORAL ONCE
Status: CANCELLED
Start: 2019-08-28

## 2019-08-21 RX ORDER — EPINEPHRINE 0.3 MG/.3ML
0.3 INJECTION SUBCUTANEOUS EVERY 5 MIN PRN
Status: CANCELLED | OUTPATIENT
Start: 2019-09-11

## 2019-08-21 RX ORDER — ALBUTEROL SULFATE 0.83 MG/ML
2.5 SOLUTION RESPIRATORY (INHALATION)
Status: CANCELLED | OUTPATIENT
Start: 2019-09-04

## 2019-08-21 RX ORDER — EPINEPHRINE 0.3 MG/.3ML
0.3 INJECTION SUBCUTANEOUS EVERY 5 MIN PRN
Status: CANCELLED | OUTPATIENT
Start: 2019-08-28

## 2019-08-21 RX ORDER — LORAZEPAM 2 MG/ML
0.5 INJECTION INTRAMUSCULAR EVERY 4 HOURS PRN
Status: CANCELLED
Start: 2019-08-28

## 2019-08-21 RX ORDER — DIPHENHYDRAMINE HYDROCHLORIDE 50 MG/ML
50 INJECTION INTRAMUSCULAR; INTRAVENOUS
Status: CANCELLED
Start: 2019-09-04

## 2019-08-21 RX ORDER — MEPERIDINE HYDROCHLORIDE 25 MG/ML
25 INJECTION INTRAMUSCULAR; INTRAVENOUS; SUBCUTANEOUS EVERY 30 MIN PRN
Status: CANCELLED | OUTPATIENT
Start: 2019-08-28

## 2019-08-21 RX ORDER — ALBUTEROL SULFATE 90 UG/1
1-2 AEROSOL, METERED RESPIRATORY (INHALATION)
Status: CANCELLED
Start: 2019-08-28

## 2019-08-21 RX ORDER — MEPERIDINE HYDROCHLORIDE 25 MG/ML
25 INJECTION INTRAMUSCULAR; INTRAVENOUS; SUBCUTANEOUS
Status: CANCELLED
Start: 2019-09-18

## 2019-08-21 RX ORDER — HEPARIN SODIUM,PORCINE 10 UNIT/ML
5 VIAL (ML) INTRAVENOUS
Status: CANCELLED | OUTPATIENT
Start: 2019-08-21

## 2019-08-21 RX ORDER — NALOXONE HYDROCHLORIDE 0.4 MG/ML
.1-.4 INJECTION, SOLUTION INTRAMUSCULAR; INTRAVENOUS; SUBCUTANEOUS
Status: CANCELLED | OUTPATIENT
Start: 2019-09-04

## 2019-08-21 RX ORDER — LORAZEPAM 2 MG/ML
0.5 INJECTION INTRAMUSCULAR EVERY 4 HOURS PRN
Status: CANCELLED
Start: 2019-09-04

## 2019-08-21 RX ORDER — EPINEPHRINE 1 MG/ML
0.3 INJECTION, SOLUTION INTRAMUSCULAR; SUBCUTANEOUS EVERY 5 MIN PRN
Status: CANCELLED | OUTPATIENT
Start: 2019-08-28

## 2019-08-21 RX ORDER — ALBUTEROL SULFATE 90 UG/1
1-2 AEROSOL, METERED RESPIRATORY (INHALATION)
Status: CANCELLED
Start: 2019-09-04

## 2019-08-21 RX ORDER — SODIUM CHLORIDE 9 MG/ML
1000 INJECTION, SOLUTION INTRAVENOUS CONTINUOUS PRN
Status: CANCELLED
Start: 2019-09-11

## 2019-08-21 RX ORDER — DIPHENHYDRAMINE HYDROCHLORIDE 50 MG/ML
50 INJECTION INTRAMUSCULAR; INTRAVENOUS
Status: CANCELLED
Start: 2019-09-18

## 2019-08-21 RX ORDER — NALOXONE HYDROCHLORIDE 0.4 MG/ML
.1-.4 INJECTION, SOLUTION INTRAMUSCULAR; INTRAVENOUS; SUBCUTANEOUS
Status: CANCELLED | OUTPATIENT
Start: 2019-08-28

## 2019-08-21 RX ORDER — MEPERIDINE HYDROCHLORIDE 25 MG/ML
25 INJECTION INTRAMUSCULAR; INTRAVENOUS; SUBCUTANEOUS EVERY 30 MIN PRN
Status: CANCELLED | OUTPATIENT
Start: 2019-09-18

## 2019-08-21 RX ORDER — MEPERIDINE HYDROCHLORIDE 25 MG/ML
25 INJECTION INTRAMUSCULAR; INTRAVENOUS; SUBCUTANEOUS EVERY 30 MIN PRN
Status: CANCELLED | OUTPATIENT
Start: 2019-09-04

## 2019-08-21 RX ORDER — DIPHENHYDRAMINE HYDROCHLORIDE 50 MG/ML
50 INJECTION INTRAMUSCULAR; INTRAVENOUS
Status: CANCELLED
Start: 2019-09-11

## 2019-08-21 RX ORDER — ACETAMINOPHEN 325 MG/1
650 TABLET ORAL ONCE
Status: CANCELLED
Start: 2019-09-18

## 2019-08-21 RX ORDER — ALBUTEROL SULFATE 0.83 MG/ML
2.5 SOLUTION RESPIRATORY (INHALATION)
Status: CANCELLED | OUTPATIENT
Start: 2019-09-11

## 2019-08-21 RX ORDER — DIPHENHYDRAMINE HCL 25 MG
50 CAPSULE ORAL ONCE
Status: CANCELLED
Start: 2019-09-18

## 2019-08-22 ENCOUNTER — ONCOLOGY VISIT (OUTPATIENT)
Dept: ONCOLOGY | Facility: CLINIC | Age: 79
End: 2019-08-22
Attending: INTERNAL MEDICINE
Payer: MEDICARE

## 2019-08-22 ENCOUNTER — PATIENT OUTREACH (OUTPATIENT)
Dept: ONCOLOGY | Facility: CLINIC | Age: 79
End: 2019-08-22

## 2019-08-22 ENCOUNTER — HOSPITAL ENCOUNTER (OUTPATIENT)
Facility: CLINIC | Age: 79
Setting detail: SPECIMEN
Discharge: HOME | End: 2019-08-22
Attending: INTERNAL MEDICINE | Admitting: INTERNAL MEDICINE
Payer: MEDICARE

## 2019-08-22 DIAGNOSIS — D59.19 OTHER AUTOIMMUNE HEMOLYTIC ANEMIAS: ICD-10-CM

## 2019-08-22 DIAGNOSIS — E53.8 B12 DEFICIENCY: ICD-10-CM

## 2019-08-22 DIAGNOSIS — D83.9 CVID (COMMON VARIABLE IMMUNODEFICIENCY) (H): ICD-10-CM

## 2019-08-22 LAB
ANISOCYTOSIS BLD QL SMEAR: ABNORMAL
BASOPHILS # BLD AUTO: 0 10E9/L (ref 0–0.2)
BASOPHILS NFR BLD AUTO: 0 %
DIFFERENTIAL METHOD BLD: ABNORMAL
EOSINOPHIL # BLD AUTO: 0 10E9/L (ref 0–0.7)
EOSINOPHIL NFR BLD AUTO: 0 %
ERYTHROCYTE [DISTWIDTH] IN BLOOD BY AUTOMATED COUNT: 19.2 % (ref 10–15)
HCT VFR BLD AUTO: 25.7 % (ref 35–47)
HGB BLD-MCNC: 8.2 G/DL (ref 11.7–15.7)
LYMPHOCYTES # BLD AUTO: 0.9 10E9/L (ref 0.8–5.3)
LYMPHOCYTES NFR BLD AUTO: 13 %
MACROCYTES BLD QL SMEAR: PRESENT
MCH RBC QN AUTO: 35.5 PG (ref 26.5–33)
MCHC RBC AUTO-ENTMCNC: 31.9 G/DL (ref 31.5–36.5)
MCV RBC AUTO: 111 FL (ref 78–100)
METAMYELOCYTES # BLD: 0.3 10E9/L
METAMYELOCYTES NFR BLD MANUAL: 4 %
MICROCYTES BLD QL SMEAR: PRESENT
MONOCYTES # BLD AUTO: 0.8 10E9/L (ref 0–1.3)
MONOCYTES NFR BLD AUTO: 12 %
NEUTROPHILS # BLD AUTO: 5 10E9/L (ref 1.6–8.3)
NEUTROPHILS NFR BLD AUTO: 71 %
PLATELET # BLD AUTO: 59 10E9/L (ref 150–450)
PLATELET # BLD EST: ABNORMAL 10*3/UL
RBC # BLD AUTO: 2.31 10E12/L (ref 3.8–5.2)
WBC # BLD AUTO: 7 10E9/L (ref 4–11)

## 2019-08-22 PROCEDURE — 36415 COLL VENOUS BLD VENIPUNCTURE: CPT

## 2019-08-22 PROCEDURE — 85025 COMPLETE CBC W/AUTO DIFF WBC: CPT | Performed by: INTERNAL MEDICINE

## 2019-08-22 NOTE — PROGRESS NOTES
Medical Assistant Note:  Tricia Sosa presents today for lab draw.    Patient seen by provider today: No.   present during visit today: Not Applicable.    Concerns: No Concerns.    Procedure:  Labs drawn: .    Post Assessment:  Labs drawn without difficulty: Yes.    Discharge Plan:  Departure Mode: Ambulatory.    Face to Face Time: 10.    Angy Villagran, [facility]

## 2019-08-22 NOTE — LETTER
8/22/2019         RE: Tricia Sosa  [address]  [place] [address]        Dear Colleague,    Thank you for referring your patient, Tricai Sosa, to the [facility]. Please see a copy of my visit note below.    Medical Assistant Note:  Tricia Sosa presents today for lab draw.    Patient seen by provider today: No.   present during visit today: Not Applicable.    Concerns: No Concerns.    Procedure:  Labs drawn: .    Post Assessment:  Labs drawn without difficulty: Yes.    Discharge Plan:  Departure Mode: Ambulatory.    Face to Face Time: 10.    Angy Villagran [facility]              Again, thank you for allowing me to participate in the care of your patient.        Sincerely,        [facility] Oncology Nurse

## 2019-08-23 ENCOUNTER — HOSPITAL ENCOUNTER (OUTPATIENT)
Facility: CLINIC | Age: 79
Setting detail: SPECIMEN
Discharge: HOME | End: 2019-08-23
Attending: INTERNAL MEDICINE | Admitting: INTERNAL MEDICINE
Payer: MEDICARE

## 2019-08-23 ENCOUNTER — ONCOLOGY VISIT (OUTPATIENT)
Dept: ONCOLOGY | Facility: CLINIC | Age: 79
End: 2019-08-23
Attending: INTERNAL MEDICINE
Payer: MEDICARE

## 2019-08-23 VITALS
TEMPERATURE: 97.5 F | HEIGHT: 72 IN | RESPIRATION RATE: 16 BRPM | OXYGEN SATURATION: 100 % | BODY MASS INDEX: 19.56 KG/M2 | WEIGHT: 144.4 LBS | SYSTOLIC BLOOD PRESSURE: 124 MMHG | HEART RATE: 64 BPM | DIASTOLIC BLOOD PRESSURE: 62 MMHG

## 2019-08-23 DIAGNOSIS — R79.89 ELEVATED D-DIMER: ICD-10-CM

## 2019-08-23 DIAGNOSIS — D59.19 OTHER AUTOIMMUNE HEMOLYTIC ANEMIAS: ICD-10-CM

## 2019-08-23 DIAGNOSIS — D83.9 CVID (COMMON VARIABLE IMMUNODEFICIENCY) (H): ICD-10-CM

## 2019-08-23 DIAGNOSIS — R06.02 SOB (SHORTNESS OF BREATH): Primary | ICD-10-CM

## 2019-08-23 DIAGNOSIS — E03.9 HYPOTHYROIDISM, UNSPECIFIED TYPE: ICD-10-CM

## 2019-08-23 LAB
ALBUMIN SERPL-MCNC: 3.5 G/DL (ref 3.4–5)
ALP SERPL-CCNC: 95 U/L (ref 40–150)
ALT SERPL W P-5'-P-CCNC: 33 U/L (ref 0–50)
ANION GAP SERPL CALCULATED.3IONS-SCNC: 3 MMOL/L (ref 3–14)
ANISOCYTOSIS BLD QL SMEAR: ABNORMAL
AST SERPL W P-5'-P-CCNC: 80 U/L (ref 0–45)
BASOPHILS # BLD AUTO: 0 10E9/L (ref 0–0.2)
BASOPHILS NFR BLD AUTO: 0 %
BILIRUB SERPL-MCNC: 1.5 MG/DL (ref 0.2–1.3)
BUN SERPL-MCNC: 14 MG/DL (ref 7–30)
CALCIUM SERPL-MCNC: 8.1 MG/DL (ref 8.5–10.1)
CHLORIDE SERPL-SCNC: 109 MMOL/L (ref 94–109)
CO2 SERPL-SCNC: 28 MMOL/L (ref 20–32)
CREAT SERPL-MCNC: 0.68 MG/DL (ref 0.52–1.04)
D DIMER PPP FEU-MCNC: 1.2 UG/ML FEU (ref 0–0.5)
DIFFERENTIAL METHOD BLD: ABNORMAL
EOSINOPHIL # BLD AUTO: 0 10E9/L (ref 0–0.7)
EOSINOPHIL NFR BLD AUTO: 1 %
ERYTHROCYTE [DISTWIDTH] IN BLOOD BY AUTOMATED COUNT: 18.8 % (ref 10–15)
GFR SERPL CREATININE-BSD FRML MDRD: 83 ML/MIN/{1.73_M2}
GLUCOSE SERPL-MCNC: 89 MG/DL (ref 70–99)
HAPTOGLOB SERPL-MCNC: <6 MG/DL (ref 35–175)
HCT VFR BLD AUTO: 25.8 % (ref 35–47)
HGB BLD-MCNC: 8.1 G/DL (ref 11.7–15.7)
LDH SERPL L TO P-CCNC: 953 U/L (ref 81–234)
LYMPHOCYTES # BLD AUTO: 0.5 10E9/L (ref 0.8–5.3)
LYMPHOCYTES NFR BLD AUTO: 13 %
MACROCYTES BLD QL SMEAR: PRESENT
MCH RBC QN AUTO: 35.4 PG (ref 26.5–33)
MCHC RBC AUTO-ENTMCNC: 31.4 G/DL (ref 31.5–36.5)
MCV RBC AUTO: 113 FL (ref 78–100)
METAMYELOCYTES # BLD: 0 10E9/L
METAMYELOCYTES NFR BLD MANUAL: 1 %
MONOCYTES # BLD AUTO: 0.3 10E9/L (ref 0–1.3)
MONOCYTES NFR BLD AUTO: 8 %
MYELOCYTES # BLD: 0.1 10E9/L
MYELOCYTES NFR BLD MANUAL: 2 %
NEUTROPHILS # BLD AUTO: 2.9 10E9/L (ref 1.6–8.3)
NEUTROPHILS NFR BLD AUTO: 75 %
NT-PROBNP SERPL-MCNC: 257 PG/ML (ref 0–450)
PLATELET # BLD AUTO: 69 10E9/L (ref 150–450)
PLATELET # BLD EST: ABNORMAL 10*3/UL
POLYCHROMASIA BLD QL SMEAR: SLIGHT
POTASSIUM SERPL-SCNC: 4.2 MMOL/L (ref 3.4–5.3)
PROT SERPL-MCNC: 6.7 G/DL (ref 6.8–8.8)
RBC # BLD AUTO: 2.29 10E12/L (ref 3.8–5.2)
SODIUM SERPL-SCNC: 140 MMOL/L (ref 133–144)
TROPONIN I SERPL-MCNC: <0.015 UG/L (ref 0–0.04)
WBC # BLD AUTO: 3.8 10E9/L (ref 4–11)

## 2019-08-23 PROCEDURE — 83010 ASSAY OF HAPTOGLOBIN QUANT: CPT | Performed by: INTERNAL MEDICINE

## 2019-08-23 PROCEDURE — 85379 FIBRIN DEGRADATION QUANT: CPT | Performed by: INTERNAL MEDICINE

## 2019-08-23 PROCEDURE — 84484 ASSAY OF TROPONIN QUANT: CPT | Performed by: INTERNAL MEDICINE

## 2019-08-23 PROCEDURE — 99215 OFFICE O/P EST HI 40 MIN: CPT | Performed by: INTERNAL MEDICINE

## 2019-08-23 PROCEDURE — 83615 LACTATE (LD) (LDH) ENZYME: CPT | Performed by: INTERNAL MEDICINE

## 2019-08-23 PROCEDURE — G0463 HOSPITAL OUTPT CLINIC VISIT: HCPCS

## 2019-08-23 PROCEDURE — 83880 ASSAY OF NATRIURETIC PEPTIDE: CPT | Performed by: INTERNAL MEDICINE

## 2019-08-23 PROCEDURE — 80053 COMPREHEN METABOLIC PANEL: CPT | Performed by: INTERNAL MEDICINE

## 2019-08-23 PROCEDURE — 85025 COMPLETE CBC W/AUTO DIFF WBC: CPT | Performed by: INTERNAL MEDICINE

## 2019-08-23 RX ORDER — PREDNISONE 20 MG/1
60 TABLET ORAL DAILY
Qty: 15 TABLET | Refills: 0 | Status: SHIPPED | OUTPATIENT
Start: 2019-08-23 | End: 2019-10-02

## 2019-08-23 ASSESSMENT — PAIN SCALES - GENERAL: PAINLEVEL: NO PAIN (0)

## 2019-08-23 ASSESSMENT — MIFFLIN-ST. JEOR: SCORE: 1246.99

## 2019-08-23 NOTE — NURSING NOTE
Oncology Rooming Note    August 23, 2019 8:48 AM   Tricia Sosa is a 78 year old female who presents for:    Chief Complaint   Patient presents with     Oncology Clinic Visit     CVID (common variable immunodeficiency)     Initial Vitals: /62   Pulse 64   Temp 97.5  F (36.4  C) (Oral)   Resp 16   Ht 1.829 m (6')   Wt 65.5 kg (144 lb 6.4 oz)   LMP  (LMP Unknown)   SpO2 100%   BMI 19.58 kg/m   Estimated body mass index is 19.58 kg/m  as calculated from the following:    Height as of this encounter: 1.829 m (6').    Weight as of this encounter: 65.5 kg (144 lb 6.4 oz). Body surface area is 1.82 meters squared.  No Pain (0) Comment: Data Unavailable   No LMP recorded (lmp unknown). Patient is postmenopausal.  Allergies reviewed: Yes  Medications reviewed: Yes    Medications: Medication refills not needed today.  Pharmacy name entered into [facility]:    [id number] - [place], [place] - [address] [address] [id number] - [facility] [address] [address]    Clinical concerns: f/u       Barbara Corrigan CMA

## 2019-08-23 NOTE — TELEPHONE ENCOUNTER
"Requested Prescriptions   Pending Prescriptions Disp Refills     levothyroxine (SYNTHROID/LEVOTHROID) 150 MCG tablet Last Written Prescription Date:  11/15/2018  Last Fill Quantity: 90 tablet,  # refills: 0   Last office visit: 8/8/2019 with prescribing provider:  8/8/2019  Future Office Visit:   Next 5 appointments (look out 90 days)    Sep 20, 2019  9:15 AM CDT  Return Visit with Jose Summers MD  [facility] ([facility]) [facility]  [address]  [address]  [facility] [address]  [phone]        90 tablet 2     Sig: Take 1 tablet (150 mcg) by mouth daily       Thyroid Protocol Passed - 8/23/2019 11:41 AM        Passed - Patient is 12 years or older        Passed - Recent (12 mo) or future (30 days) visit within the authorizing provider's specialty     Patient had office visit in the last 12 months or has a visit in the next 30 days with authorizing provider or within the authorizing provider's specialty.  See \"Patient Info\" tab in inbasket, or \"Choose Columns\" in Meds & Orders section of the refill encounter.              Passed - Medication is active on med list        Passed - Normal TSH on file in past 12 months     Recent Labs   Lab Test 10/16/18  1036   TSH 2.81              Passed - No active pregnancy on record     If patient is pregnant or has had a positive pregnancy test, please check TSH.          Passed - No positive pregnancy test in past 12 months     If patient is pregnant or has had a positive pregnancy test, please check TSH.          "

## 2019-08-23 NOTE — PROGRESS NOTES
Writer called Tricia to notify her that her Hgb is 8.2 today so she will not need to come in for a transfusion tomorrow. Dr. Summers would like to speak to her about starting Rituxan soon so writer was going to set up an appt for her to come in to speak with Dr. Summers.     There was no answer. Writer left message to return my call regarding her appt for tomorrow 8/23. [phone].    Anabell Hawkins, RN, BSN  Patient Care Coordinator  [facility] [place]  [phone]

## 2019-08-23 NOTE — LETTER
8/23/2019         RE: Tricia Sosa  [address]  [place] [address]        Dear Colleague,    Thank you for referring your patient, Tricia Sosa, to the [facility]. Please see a copy of my visit note below.    [facility]  FOLLOW-UP VISIT NOTE    PATIENT NAME: Tricia Sosa MRN # [id number]  DATE OF VISIT: Aug 23, 2019 YOB: 1940    REFERRING PROVIDER: No referring provider defined for this encounter.    DIAGNOSIS: Hemolytic anemia-autoimmune; IgA deficiency    TREATMENT SUMMARY:  Tricia has been diagnosed with IgA deficiency since her around 2000.  She was very sick at the time and had severe diarrhea.  She lost about 40 pounds in weight.  The workup revealed that she had markedly diminished CD4 counts of 48 and was diagnosed with CMV colitis.  Since then she has been followed in hematology clinic at [place] until recently.  She was noted to have anemia which was fairly long-standing.  She was initially referred for anemia in 2004 and also had a bone marrow biopsy done at that time which revealed hypercellular bone marrow with 70-80% cellularity and normal trilineage hematopoiesis and no morphologic features of MDS or lymphoproliferation.  Her anemia was attributed to her chronic underlying disease.  At the next evaluation in 2002 on 4 aggressive anemia there was no evidence of hemolysis, iron deficiency, B12 or folate deficiency.  Bone marrow biopsy was not pursued.  In summer of 2015 she had progressive fatigue, dyspnea on exertion and worsening anemia with a hemoglobin now of 9.  Workup at this time did suggest a hemolytic anemia with elevated LDH at 709, undetectable haptoglobin and elevated unconjugated bilirubin at 3.3.  Monospecific MARIKA was positive for both IgG and complement.  Cold agglutinin screen was positive with very low titer 1:64.  She was started on prednisone 60 mg daily with supplementation of iron folate and B12  starting 7/31/15.  Her prednisone has been slowly tapered and was discontinued off in January 2016.  She was last followed at [facility] on March 10 when she had stable hemoglobin.      SUBJECTIVE   Tricia comes alone for this clinic visit for her hemolytic anemia.    Tricia has increasing SOB. This is lot worse than in the past. She cannot point out a particular time when her symptoms have changed but this has been a steady decline. She had a hard time walking from the parking lot to the elevator. This is very unusual for her. I am seeing her as an add on for her severe SOB.     Wt Readings from Last 10 Encounters:   08/23/19 65.5 kg (144 lb 6.4 oz)   08/20/19 66.1 kg (145 lb 12.8 oz)   08/08/19 65.5 kg (144 lb 4.8 oz)   08/05/19 65.8 kg (145 lb)   07/23/19 66 kg (145 lb 9.6 oz)   07/10/19 65.8 kg (145 lb)   06/18/19 67.1 kg (148 lb)   05/28/19 67.1 kg (148 lb)   04/30/19 68.2 kg (150 lb 6.4 oz)   04/02/19 69.3 kg (152 lb 11.2 oz)       PAST MEDICAL HISTORY   1. Common variable immunodeficiency syndrome  2. 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 Selective IgA deficiency  4. Leukopenia with markedly low CD4 counts on Bactrim prophylaxis  5. History of fall with subdural hematoma in 6/11/14 with complete resolution  6. Hashimoto's disease status post partial thyroidectomy      CURRENT OUTPATIENT MEDICATIONS     Current Outpatient Medications   Medication Sig     cyanocobalamin (VITAMIN  B-12) 1000 MCG tablet      fluocinolone (SYNALAR) 0.01 % external cream Apply topically 2 times daily     folic acid (FOLVITE) 1 MG tablet      ketoconazole (NIZORAL) 2 % external shampoo Use every other day to scalp as needed     ketoconazole 1 % shampoo Use every 3 days as directed     levothyroxine (SYNTHROID/LEVOTHROID) 150 MCG tablet TAKE 1 TABLET BY MOUTH ONCE DAILY     sulfamethoxazole-trimethoprim (BACTRIM DS/SEPTRA DS) 800-160 MG per tablet Take 1 pill orally on Mon, Wed and  Friday     No current facility-administered medications for this visit.         ALLERGIES     Allergies   Allergen Reactions     Doxycycline         REVIEW OF SYSTEMS   As above in the HPI, o/w complete 12-point ROS was negative.     PHYSICAL EXAM   /62   Pulse 64   Temp 97.5  F (36.4  C) (Oral)   Resp 16   Ht 1.829 m (6')   Wt 65.5 kg (144 lb 6.4 oz)   LMP  (LMP Unknown)   SpO2 100%   BMI 19.58 kg/m       SpO2 Readings from Last 4 Encounters:   11/13/18 99%   10/16/18 99%   09/27/18 98%   09/19/18 100%     Wt Readings from Last 3 Encounters:   08/23/19 65.5 kg (144 lb 6.4 oz)   08/20/19 66.1 kg (145 lb 12.8 oz)   08/08/19 65.5 kg (144 lb 4.8 oz)     GEN: NAD  HEENT: PERRL, EOMI, no icterus, injection or pallor. Oropharynx is clear.  NECK: no cervical or supraclavicular lymphadenopathy  LUNGS: clear bilaterally  CV: regular, no murmurs, rubs, or gallops  ABDOMEN: soft, non-tender, non-distended, normal bowel sounds, no hepatosplenomegaly by percussion or palpation  EXT: warm, well perfused, no edema  NEURO: alert  SKIN: no rashes     LABORATORY AND IMAGING STUDIES     Recent Labs   Lab Test 07/16/19  1000 04/23/19  0944 01/29/19  1007 11/06/18  1002 10/16/18  1036    141 140 140 139   POTASSIUM 3.9 3.9 4.0 4.2 3.8   CHLORIDE 106 107 106 105 106   CO2 29 27 29 26 25   ANIONGAP 3 7 5 9 8   BUN 14 14 16 14 13   CR 0.65 0.69 0.74 0.86 0.63   GLC 91 86 65* 95 123*   CULLEN 8.1* 8.7 8.5 8.8 7.9*     No results for input(s): MAG, PHOS in the last 56222 hours.  Recent Labs   Lab Test 07/16/19  1000 04/23/19  0944 01/29/19  1007 11/06/18  1002 10/16/18  1036   WBC 4.6 3.5* 4.5 4.2 5.4   HGB 9.8* 11.4* 11.2* 11.9 10.8*   PLT 99* 120* 145* 155 148*    92 90 89 91   NEUTROPHIL 65.8 55.6 53.4 59.9 68.5     Recent Labs   Lab Test 07/16/19  1000 04/23/19  0944 01/29/19  1007   BILITOTAL 1.4* 0.8 0.7   ALKPHOS 103 107 107   ALT 47 35 29   AST 82* 49* 41   ALBUMIN 3.6 3.8 3.8   * 346* 269*     TSH    Date Value Ref Range Status   10/16/2018 2.81 0.40 - 4.00 mU/L Final   10/20/2017 1.90 0.40 - 4.00 mU/L Final   06/19/2017 1.82 0.40 - 4.00 mU/L Final     Recent Labs   Lab Test 07/16/19  1000 04/23/19  0944 01/29/19  1007 11/06/18  1002 10/16/18  1036  04/14/15  1116   B12 540 814 696 834 847   < > 277   FOLIC  --   --   --   --   --   --  10.7   HGB 9.8* 11.4* 11.2* 11.9 10.8*   < >  --    RETICABSCT 145.1* 102.7* 80.9 75.0 65.1   < >  --    RETP 4.8* 2.8* 2.1* 1.9 1.9   < >  --     < > = values in this interval not displayed.        Recent Labs   Lab Test 07/16/19  1000 04/23/19  0944 01/29/19  1007 11/06/18  1002 10/16/18  1036  02/25/16  0903   IGA <7* <7* <7* <7* <7*   < >  --     954 969 974 889   < >  --     79 91 130 131   < >  --    ELPM  --   --   --   --   --   --  0.0    < > = values in this interval not displayed.        ASSESSMENT AND PLAN   1. 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 Common variable immunodeficiency disorder  4. Splenomegaly    Tricia Clarke presents with progressive increasing shortness of breath.  This is very unusual for her.  She is unable to walk even short distances.  She points that she can barely make it to the elevators from the parking lot.  In the past, she had unrestricted exercise tolerance, at least on level ground.  This is not completely explained by her degree of anemia.  Her hemoglobin has been around 8 g/dL, which is lower than what it was a couple months ago but still should not cause this level of distress.      I will work her up for her shortness of breath.  I will get labs including proBNP, troponin and EKG to workup any possible cardiac cause.  I will get a D-dimer drawn at this clinic visit for pulmonary embolism.  I will also get CBC, LDH to see how badly she is hemolyzing.  Our subsequent management would depend on what we find.      ADDENDUM:   I reviewed her labs and they suggest slight improvement with decreased hemolysis since 3 days ago.  Her total bilirubin is slightly better and her LDH is also not as bad.  Her hemoglobin has been stable.  Her EKG does not show any ST-T changes.  She does not have elevated proBNP or troponin.  We have been working to get her into Pulmonary Clinic to see if there is a respiratory cause for this.  Her D-dimer is, however, elevated at 1.3.  I will get a stat CT chest for PE.  I called her to inform her about this and she should expect a phone call from us.  We will get this going as soon as possible.        We will still plan to continue on starting her with intravenous rituximab weekly x4 as soon as possible.  She is already scheduled to see me in September and for now we will keep that appointment.     Over 45 min of direct face to face time spent with patient with more than 50% time spent in counseling and coordinating care.      Jose Summers  Adj ,  Division of Hematology, Oncology & Transplantation  [facility].        RNCC contacted Pt regarding scheduled CT Angiogram on 8/27/19 at 9:00 am. Pt confirmed this. Pt instructed and educated to report to the Emergency Department in the interim if shortness of breath increases, chest pain, heart palpitation or any new changes to get test sooner. Pt agrees with plan. No further questions or concerns. Pt grateful Dr. Summers is having tests to try to get answers as to Pt's concerns.     Asha Shields, BSN, RN, OCN  RN Cancer Care Coordinator  [facility]  [phone]      Again, thank you for allowing me to participate in the care of your patient.        Sincerely,        Jose Summers MD

## 2019-08-23 NOTE — PROGRESS NOTES
RNCC contacted Pt regarding scheduled CT Angiogram on 8/27/19 at 9:00 am. Pt confirmed this. Pt instructed and educated to report to the Emergency Department in the interim if shortness of breath increases, chest pain, heart palpitation or any new changes to get test sooner. Pt agrees with plan. No further questions or concerns. Pt grateful Dr. Summers is having tests to try to get answers as to Pt's concerns.     Asha Shields, BSN, RN, OCN  RN Cancer Care Coordinator  [facility]  [phone]

## 2019-08-23 NOTE — PROGRESS NOTES
Late entry from 8/22/19:  Pt called again to verify that she was aware that blood transfusion was canceled for 8/23/19.  Pt states she did not listen to her message.  Pt states she is still short of breath with activity and wondering what the next step is with care.  Dr Summers notified and would like to see pt in the clinic on 8/23/19 to reevaluate and discuss possible Rituxan.  Pt called back and instructed with plan and provided with appt for today at 8:45 am.  Pt verbalized understanding of plan.    Tanya Piper, RN, BSN, OCN

## 2019-08-23 NOTE — PROGRESS NOTES
[facility]  FOLLOW-UP VISIT NOTE    PATIENT NAME: Tricia Sosa MRN # [id number]  DATE OF VISIT: Aug 23, 2019 YOB: 1940    REFERRING PROVIDER: No referring provider defined for this encounter.    DIAGNOSIS: Hemolytic anemia-autoimmune; IgA deficiency    TREATMENT SUMMARY:  Tricia has been diagnosed with IgA deficiency since her around 2000.  She was very sick at the time and had severe diarrhea.  She lost about 40 pounds in weight.  The workup revealed that she had markedly diminished CD4 counts of 48 and was diagnosed with CMV colitis.  Since then she has been followed in hematology clinic at [place] until recently.  She was noted to have anemia which was fairly long-standing.  She was initially referred for anemia in 2004 and also had a bone marrow biopsy done at that time which revealed hypercellular bone marrow with 70-80% cellularity and normal trilineage hematopoiesis and no morphologic features of MDS or lymphoproliferation.  Her anemia was attributed to her chronic underlying disease.  At the next evaluation in 2002 on 4 aggressive anemia there was no evidence of hemolysis, iron deficiency, B12 or folate deficiency.  Bone marrow biopsy was not pursued.  In summer of 2015 she had progressive fatigue, dyspnea on exertion and worsening anemia with a hemoglobin now of 9.  Workup at this time did suggest a hemolytic anemia with elevated LDH at 709, undetectable haptoglobin and elevated unconjugated bilirubin at 3.3.  Monospecific MARIKA was positive for both IgG and complement.  Cold agglutinin screen was positive with very low titer 1:64.  She was started on prednisone 60 mg daily with supplementation of iron folate and B12 starting 7/31/15.  Her prednisone has been slowly tapered and was discontinued off in January 2016.  She was last followed at [facility] on March 10 when she had stable hemoglobin.      SUBJECTIVE   Tricia comes alone for this clinic visit for  her hemolytic anemia.    Tricia has increasing SOB. This is lot worse than in the past. She cannot point out a particular time when her symptoms have changed but this has been a steady decline. She had a hard time walking from the parking lot to the elevator. This is very unusual for her. I am seeing her as an add on for her severe SOB.     Wt Readings from Last 10 Encounters:   08/23/19 65.5 kg (144 lb 6.4 oz)   08/20/19 66.1 kg (145 lb 12.8 oz)   08/08/19 65.5 kg (144 lb 4.8 oz)   08/05/19 65.8 kg (145 lb)   07/23/19 66 kg (145 lb 9.6 oz)   07/10/19 65.8 kg (145 lb)   06/18/19 67.1 kg (148 lb)   05/28/19 67.1 kg (148 lb)   04/30/19 68.2 kg (150 lb 6.4 oz)   04/02/19 69.3 kg (152 lb 11.2 oz)       PAST MEDICAL HISTORY   1. Common variable immunodeficiency syndrome  2. 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 Selective IgA deficiency  4. Leukopenia with markedly low CD4 counts on Bactrim prophylaxis  5. History of fall with subdural hematoma in 6/11/14 with complete resolution  6. Hashimoto's disease status post partial thyroidectomy      CURRENT OUTPATIENT MEDICATIONS     Current Outpatient Medications   Medication Sig     cyanocobalamin (VITAMIN  B-12) 1000 MCG tablet      fluocinolone (SYNALAR) 0.01 % external cream Apply topically 2 times daily     folic acid (FOLVITE) 1 MG tablet      ketoconazole (NIZORAL) 2 % external shampoo Use every other day to scalp as needed     ketoconazole 1 % shampoo Use every 3 days as directed     levothyroxine (SYNTHROID/LEVOTHROID) 150 MCG tablet TAKE 1 TABLET BY MOUTH ONCE DAILY     sulfamethoxazole-trimethoprim (BACTRIM DS/SEPTRA DS) 800-160 MG per tablet Take 1 pill orally on Mon, Wed and Friday     No current facility-administered medications for this visit.         ALLERGIES     Allergies   Allergen Reactions     Doxycycline         REVIEW OF SYSTEMS   As above in the HPI, o/w complete 12-point ROS was negative.     PHYSICAL EXAM    /62   Pulse 64   Temp 97.5  F (36.4  C) (Oral)   Resp 16   Ht 1.829 m (6')   Wt 65.5 kg (144 lb 6.4 oz)   LMP  (LMP Unknown)   SpO2 100%   BMI 19.58 kg/m      SpO2 Readings from Last 4 Encounters:   11/13/18 99%   10/16/18 99%   09/27/18 98%   09/19/18 100%     Wt Readings from Last 3 Encounters:   08/23/19 65.5 kg (144 lb 6.4 oz)   08/20/19 66.1 kg (145 lb 12.8 oz)   08/08/19 65.5 kg (144 lb 4.8 oz)     GEN: NAD  HEENT: PERRL, EOMI, no icterus, injection or pallor. Oropharynx is clear.  NECK: no cervical or supraclavicular lymphadenopathy  LUNGS: clear bilaterally  CV: regular, no murmurs, rubs, or gallops  ABDOMEN: soft, non-tender, non-distended, normal bowel sounds, no hepatosplenomegaly by percussion or palpation  EXT: warm, well perfused, no edema  NEURO: alert  SKIN: no rashes     LABORATORY AND IMAGING STUDIES     Recent Labs   Lab Test 07/16/19  1000 04/23/19  0944 01/29/19  1007 11/06/18  1002 10/16/18  1036    141 140 140 139   POTASSIUM 3.9 3.9 4.0 4.2 3.8   CHLORIDE 106 107 106 105 106   CO2 29 27 29 26 25   ANIONGAP 3 7 5 9 8   BUN 14 14 16 14 13   CR 0.65 0.69 0.74 0.86 0.63   GLC 91 86 65* 95 123*   CULLEN 8.1* 8.7 8.5 8.8 7.9*     No results for input(s): MAG, PHOS in the last 14779 hours.  Recent Labs   Lab Test 07/16/19  1000 04/23/19  0944 01/29/19  1007 11/06/18  1002 10/16/18  1036   WBC 4.6 3.5* 4.5 4.2 5.4   HGB 9.8* 11.4* 11.2* 11.9 10.8*   PLT 99* 120* 145* 155 148*    92 90 89 91   NEUTROPHIL 65.8 55.6 53.4 59.9 68.5     Recent Labs   Lab Test 07/16/19  1000 04/23/19  0944 01/29/19  1007   BILITOTAL 1.4* 0.8 0.7   ALKPHOS 103 107 107   ALT 47 35 29   AST 82* 49* 41   ALBUMIN 3.6 3.8 3.8   * 346* 269*     TSH   Date Value Ref Range Status   10/16/2018 2.81 0.40 - 4.00 mU/L Final   10/20/2017 1.90 0.40 - 4.00 mU/L Final   06/19/2017 1.82 0.40 - 4.00 mU/L Final     Recent Labs   Lab Test 07/16/19  1000 04/23/19  0944 01/29/19  1007 11/06/18  1002  10/16/18  1036  04/14/15  1116   B12 540 814 696 834 847   < > 277   FOLIC  --   --   --   --   --   --  10.7   HGB 9.8* 11.4* 11.2* 11.9 10.8*   < >  --    RETICABSCT 145.1* 102.7* 80.9 75.0 65.1   < >  --    RETP 4.8* 2.8* 2.1* 1.9 1.9   < >  --     < > = values in this interval not displayed.        Recent Labs   Lab Test 07/16/19  1000 04/23/19  0944 01/29/19  1007 11/06/18  1002 10/16/18  1036  02/25/16  0903   IGA <7* <7* <7* <7* <7*   < >  --     954 969 974 889   < >  --     79 91 130 131   < >  --    ELPM  --   --   --   --   --   --  0.0    < > = values in this interval not displayed.        ASSESSMENT AND PLAN   1. 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 Common variable immunodeficiency disorder  4. Splenomegaly    Tricia Clarke presents with progressive increasing shortness of breath.  This is very unusual for her.  She is unable to walk even short distances.  She points that she can barely make it to the elevators from the parking lot.  In the past, she had unrestricted exercise tolerance, at least on level ground.  This is not completely explained by her degree of anemia.  Her hemoglobin has been around 8 g/dL, which is lower than what it was a couple months ago but still should not cause this level of distress.      I will work her up for her shortness of breath.  I will get labs including proBNP, troponin and EKG to workup any possible cardiac cause.  I will get a D-dimer drawn at this clinic visit for pulmonary embolism.  I will also get CBC, LDH to see how badly she is hemolyzing.  Our subsequent management would depend on what we find.      ADDENDUM:  I reviewed her labs and they suggest slight improvement with decreased hemolysis since 3 days ago.  Her total bilirubin is slightly better and her LDH is also not as bad.  Her hemoglobin has been stable.  Her EKG does not show any ST-T  changes.  She does not have elevated proBNP or troponin.  We have been working to get her into Pulmonary Clinic to see if there is a respiratory cause for this.  Her D-dimer is, however, elevated at 1.3.  I will get a stat CT chest for PE.  I called her to inform her about this and she should expect a phone call from us.  We will get this going as soon as possible.        We will still plan to continue on starting her with intravenous rituximab weekly x4 as soon as possible.  She is already scheduled to see me in September and for now we will keep that appointment.     Over 45 min of direct face to face time spent with patient with more than 50% time spent in counseling and coordinating care.      Jose Spain ,  Division of Hematology, Oncology & Transplantation  [facility].

## 2019-08-26 RX ORDER — LEVOTHYROXINE SODIUM 150 UG/1
150 TABLET ORAL DAILY
Qty: 90 TABLET | Refills: 0 | Status: SHIPPED | OUTPATIENT
Start: 2019-08-26 | End: 2019-11-22

## 2019-08-26 NOTE — TELEPHONE ENCOUNTER
Last OV 8/8/19    Prescription approved per [facility] Refill Protocol.      TSH   Date Value Ref Range Status   10/16/2018 2.81 0.40 - 4.00 mU/L Final

## 2019-08-27 ENCOUNTER — HOSPITAL ENCOUNTER (OUTPATIENT)
Dept: CT IMAGING | Facility: CLINIC | Age: 79
Discharge: HOME | End: 2019-08-27
Attending: INTERNAL MEDICINE | Admitting: INTERNAL MEDICINE
Payer: MEDICARE

## 2019-08-27 DIAGNOSIS — D59.19 OTHER AUTOIMMUNE HEMOLYTIC ANEMIAS: ICD-10-CM

## 2019-08-27 DIAGNOSIS — D83.9 CVID (COMMON VARIABLE IMMUNODEFICIENCY) (H): ICD-10-CM

## 2019-08-27 DIAGNOSIS — R06.02 SOB (SHORTNESS OF BREATH): ICD-10-CM

## 2019-08-27 DIAGNOSIS — R79.89 ELEVATED D-DIMER: ICD-10-CM

## 2019-08-27 PROCEDURE — 25000128 H RX IP 250 OP 636: Performed by: INTERNAL MEDICINE

## 2019-08-27 PROCEDURE — 71275 CT ANGIOGRAPHY CHEST: CPT

## 2019-08-27 RX ORDER — IOPAMIDOL 755 MG/ML
58 INJECTION, SOLUTION INTRAVASCULAR ONCE
Status: COMPLETED | OUTPATIENT
Start: 2019-08-27 | End: 2019-08-27

## 2019-08-27 RX ADMIN — IOPAMIDOL 58 ML: 755 INJECTION, SOLUTION INTRAVENOUS at 08:02

## 2019-08-28 ENCOUNTER — HOSPITAL ENCOUNTER (OUTPATIENT)
Facility: CLINIC | Age: 79
Setting detail: SPECIMEN
Discharge: HOME | End: 2019-08-28
Attending: INTERNAL MEDICINE | Admitting: INTERNAL MEDICINE
Payer: MEDICARE

## 2019-08-28 ENCOUNTER — INFUSION THERAPY VISIT (OUTPATIENT)
Dept: INFUSION THERAPY | Facility: CLINIC | Age: 79
End: 2019-08-28
Attending: INTERNAL MEDICINE
Payer: MEDICARE

## 2019-08-28 VITALS
HEART RATE: 77 BPM | TEMPERATURE: 97.6 F | WEIGHT: 145.6 LBS | OXYGEN SATURATION: 100 % | RESPIRATION RATE: 18 BRPM | SYSTOLIC BLOOD PRESSURE: 141 MMHG | DIASTOLIC BLOOD PRESSURE: 63 MMHG | BODY MASS INDEX: 19.75 KG/M2

## 2019-08-28 DIAGNOSIS — D83.9 CVID (COMMON VARIABLE IMMUNODEFICIENCY) (H): ICD-10-CM

## 2019-08-28 DIAGNOSIS — D59.19 OTHER AUTOIMMUNE HEMOLYTIC ANEMIAS: Primary | ICD-10-CM

## 2019-08-28 LAB
BASOPHILS # BLD AUTO: 0 10E9/L (ref 0–0.2)
BASOPHILS NFR BLD AUTO: 0.3 %
DIFFERENTIAL METHOD BLD: ABNORMAL
EOSINOPHIL # BLD AUTO: 0 10E9/L (ref 0–0.7)
EOSINOPHIL NFR BLD AUTO: 0.3 %
ERYTHROCYTE [DISTWIDTH] IN BLOOD BY AUTOMATED COUNT: 15.4 % (ref 10–15)
HCT VFR BLD AUTO: 25.8 % (ref 35–47)
HGB BLD-MCNC: 8.7 G/DL (ref 11.7–15.7)
IMM GRANULOCYTES # BLD: 0 10E9/L (ref 0–0.4)
IMM GRANULOCYTES NFR BLD: 0.8 %
LYMPHOCYTES # BLD AUTO: 0.9 10E9/L (ref 0.8–5.3)
LYMPHOCYTES NFR BLD AUTO: 23 %
MCH RBC QN AUTO: 35.2 PG (ref 26.5–33)
MCHC RBC AUTO-ENTMCNC: 33.7 G/DL (ref 31.5–36.5)
MCV RBC AUTO: 105 FL (ref 78–100)
MONOCYTES # BLD AUTO: 0.6 10E9/L (ref 0–1.3)
MONOCYTES NFR BLD AUTO: 13.9 %
NEUTROPHILS # BLD AUTO: 2.5 10E9/L (ref 1.6–8.3)
NEUTROPHILS NFR BLD AUTO: 61.7 %
NRBC # BLD AUTO: 0 10*3/UL
NRBC BLD AUTO-RTO: 0 /100
PLATELET # BLD AUTO: 63 10E9/L (ref 150–450)
RBC # BLD AUTO: 2.47 10E12/L (ref 3.8–5.2)
WBC # BLD AUTO: 4 10E9/L (ref 4–11)

## 2019-08-28 PROCEDURE — 96415 CHEMO IV INFUSION ADDL HR: CPT

## 2019-08-28 PROCEDURE — 25000128 H RX IP 250 OP 636: Performed by: INTERNAL MEDICINE

## 2019-08-28 PROCEDURE — 25000132 ZZH RX MED GY IP 250 OP 250 PS 637: Mod: GY | Performed by: INTERNAL MEDICINE

## 2019-08-28 PROCEDURE — 85025 COMPLETE CBC W/AUTO DIFF WBC: CPT | Performed by: INTERNAL MEDICINE

## 2019-08-28 PROCEDURE — 96413 CHEMO IV INFUSION 1 HR: CPT

## 2019-08-28 PROCEDURE — 96375 TX/PRO/DX INJ NEW DRUG ADDON: CPT

## 2019-08-28 PROCEDURE — 25800030 ZZH RX IP 258 OP 636: Performed by: INTERNAL MEDICINE

## 2019-08-28 RX ORDER — ACETAMINOPHEN 325 MG/1
650 TABLET ORAL ONCE
Status: COMPLETED | OUTPATIENT
Start: 2019-08-28 | End: 2019-08-28

## 2019-08-28 RX ADMIN — SODIUM CHLORIDE 250 ML: 9 INJECTION, SOLUTION INTRAVENOUS at 10:28

## 2019-08-28 RX ADMIN — DIPHENHYDRAMINE HYDROCHLORIDE 50 MG: 50 INJECTION, SOLUTION INTRAMUSCULAR; INTRAVENOUS at 10:28

## 2019-08-28 RX ADMIN — RITUXIMAB 700 MG: 10 INJECTION, SOLUTION INTRAVENOUS at 10:43

## 2019-08-28 RX ADMIN — ACETAMINOPHEN 650 MG: 325 TABLET ORAL at 10:28

## 2019-08-28 NOTE — PROGRESS NOTES
Infusion Nursing Note:  Tricia Sosa presents today for Rituxan.    Patient seen by provider today: No   present during visit today: Not Applicable.    Note: Pt states she is feeling well overall today. States her shortness of breath and weakness have improved since starting the prednisone. She just finished the prednisone last night.    Intravenous Access:  Labs drawn without difficulty.  Peripheral IV placed.    Treatment Conditions:  Lab Results   Component Value Date    HGB 8.7 08/28/2019     Lab Results   Component Value Date    WBC 4.0 08/28/2019      Lab Results   Component Value Date    ANEU 2.5 08/28/2019     Lab Results   Component Value Date    PLT 63 08/28/2019      Results reviewed, labs did NOT meet treatment parameters: Platelets 63.  Notified Dr. Summers of patients platelet level of 63 and parameters state to give Rituxan if platelets 75 or greater. Per Dr. Summers, ok to proceed with Rituxan today with platelet level being 63.       Post Infusion Assessment:  Patient tolerated infusion without incident.  Blood return noted pre and post infusion.  Site patent and intact, free from redness, edema or discomfort.  No evidence of extravasations.  Access discontinued per protocol.       Discharge Plan:   Discharge instructions reviewed with: Patient.  Patient and/or family verbalized understanding of discharge instructions and all questions answered.  Copy of AVS reviewed with patient and/or family.  Patient will return 9/4/19 for next appointment.  Patient discharged in stable condition accompanied by: self.  Departure Mode: Ambulatory.    Tricia Iyer RN

## 2019-08-29 ENCOUNTER — HOSPITAL ENCOUNTER (OUTPATIENT)
Dept: CARDIOLOGY | Facility: CLINIC | Age: 79
Discharge: HOME | End: 2019-08-29
Attending: INTERNAL MEDICINE | Admitting: INTERNAL MEDICINE
Payer: MEDICARE

## 2019-08-29 DIAGNOSIS — R06.09 DYSPNEA ON EXERTION: ICD-10-CM

## 2019-08-29 PROCEDURE — 93306 TTE W/DOPPLER COMPLETE: CPT

## 2019-08-29 PROCEDURE — 93306 TTE W/DOPPLER COMPLETE: CPT | Mod: 26 | Performed by: INTERNAL MEDICINE

## 2019-09-04 ENCOUNTER — INFUSION THERAPY VISIT (OUTPATIENT)
Dept: INFUSION THERAPY | Facility: CLINIC | Age: 79
End: 2019-09-04
Attending: INTERNAL MEDICINE
Payer: MEDICARE

## 2019-09-04 ENCOUNTER — HOSPITAL ENCOUNTER (OUTPATIENT)
Facility: CLINIC | Age: 79
Setting detail: SPECIMEN
Discharge: HOME | End: 2019-09-04
Attending: INTERNAL MEDICINE | Admitting: INTERNAL MEDICINE
Payer: MEDICARE

## 2019-09-04 VITALS
DIASTOLIC BLOOD PRESSURE: 66 MMHG | RESPIRATION RATE: 16 BRPM | WEIGHT: 144 LBS | SYSTOLIC BLOOD PRESSURE: 128 MMHG | TEMPERATURE: 97.6 F | HEART RATE: 74 BPM | BODY MASS INDEX: 19.53 KG/M2

## 2019-09-04 DIAGNOSIS — D59.19 OTHER AUTOIMMUNE HEMOLYTIC ANEMIAS: Primary | ICD-10-CM

## 2019-09-04 DIAGNOSIS — D83.9 CVID (COMMON VARIABLE IMMUNODEFICIENCY) (H): ICD-10-CM

## 2019-09-04 LAB
ANISOCYTOSIS BLD QL SMEAR: SLIGHT
BASOPHILS # BLD AUTO: 0 10E9/L (ref 0–0.2)
BASOPHILS NFR BLD AUTO: 0 %
DIFFERENTIAL METHOD BLD: ABNORMAL
EOSINOPHIL # BLD AUTO: 0.1 10E9/L (ref 0–0.7)
EOSINOPHIL NFR BLD AUTO: 1 %
ERYTHROCYTE [DISTWIDTH] IN BLOOD BY AUTOMATED COUNT: 15.9 % (ref 10–15)
HCT VFR BLD AUTO: 26.4 % (ref 35–47)
HGB BLD-MCNC: 8.5 G/DL (ref 11.7–15.7)
LYMPHOCYTES # BLD AUTO: 0.9 10E9/L (ref 0.8–5.3)
LYMPHOCYTES NFR BLD AUTO: 15 %
MACROCYTES BLD QL SMEAR: PRESENT
MCH RBC QN AUTO: 34 PG (ref 26.5–33)
MCHC RBC AUTO-ENTMCNC: 32.2 G/DL (ref 31.5–36.5)
MCV RBC AUTO: 106 FL (ref 78–100)
MONOCYTES # BLD AUTO: 0.5 10E9/L (ref 0–1.3)
MONOCYTES NFR BLD AUTO: 9 %
NEUTROPHILS # BLD AUTO: 4.3 10E9/L (ref 1.6–8.3)
NEUTROPHILS NFR BLD AUTO: 75 %
PLATELET # BLD AUTO: 90 10E9/L (ref 150–450)
PLATELET # BLD EST: ABNORMAL 10*3/UL
RBC # BLD AUTO: 2.5 10E12/L (ref 3.8–5.2)
WBC # BLD AUTO: 5.7 10E9/L (ref 4–11)

## 2019-09-04 PROCEDURE — 96415 CHEMO IV INFUSION ADDL HR: CPT

## 2019-09-04 PROCEDURE — 25000128 H RX IP 250 OP 636: Performed by: INTERNAL MEDICINE

## 2019-09-04 PROCEDURE — 25800030 ZZH RX IP 258 OP 636: Performed by: INTERNAL MEDICINE

## 2019-09-04 PROCEDURE — 85025 COMPLETE CBC W/AUTO DIFF WBC: CPT | Performed by: INTERNAL MEDICINE

## 2019-09-04 PROCEDURE — 96413 CHEMO IV INFUSION 1 HR: CPT

## 2019-09-04 PROCEDURE — 25000132 ZZH RX MED GY IP 250 OP 250 PS 637: Mod: GY | Performed by: INTERNAL MEDICINE

## 2019-09-04 RX ORDER — ACETAMINOPHEN 325 MG/1
650 TABLET ORAL ONCE
Status: COMPLETED | OUTPATIENT
Start: 2019-09-04 | End: 2019-09-04

## 2019-09-04 RX ORDER — DIPHENHYDRAMINE HCL 25 MG
50 CAPSULE ORAL ONCE
Status: COMPLETED | OUTPATIENT
Start: 2019-09-04 | End: 2019-09-04

## 2019-09-04 RX ADMIN — DIPHENHYDRAMINE HYDROCHLORIDE 50 MG: 25 CAPSULE ORAL at 10:49

## 2019-09-04 RX ADMIN — ACETAMINOPHEN 650 MG: 325 TABLET ORAL at 10:49

## 2019-09-04 RX ADMIN — RITUXIMAB 700 MG: 10 INJECTION, SOLUTION INTRAVENOUS at 11:07

## 2019-09-04 NOTE — PROGRESS NOTES
Infusion Nursing Note:  Tricia Sosa presents today for rituxan.    Patient seen by provider today: No   present during visit today: Not Applicable.    Note: Rituxan increased by 100 ml/hr every 30 minutes with max of 400 ml/hr as ordered.    Intravenous Access:  Labs drawn without difficulty.  Peripheral IV placed.    Treatment Conditions:  Lab Results   Component Value Date    HGB 8.5 09/04/2019     Lab Results   Component Value Date    WBC 5.7 09/04/2019      Lab Results   Component Value Date    ANEU 4.3 09/04/2019     Lab Results   Component Value Date    PLT 90 09/04/2019      Results reviewed, labs MET treatment parameters, ok to proceed with treatment.  Biological Infusion Checklist:  ~~~ NOTE: If the patient answers yes to any of the questions below, hold the infusion and contact ordering provider or on-call provider.    1. Have you recently had an elevated temperature, fever, chills, productive cough, coughing for 3 weeks or longer or hemoptysis, abnormal vital signs, night sweats,  chest pain or have you noticed a decrease in your appetite, unexplained weight loss or fatigue? No  2. Do you have any open wounds or new incisions? No  3. Do you have any recent or upcoming hospitalizations, surgeries or dental procedures? No  4. Do you currently have or recently have had any signs of illness or infection or are you on any antibiotics? No  5. Have you had any new, sudden or worsening abdominal pain? No  6. Have you or anyone in your household received a live vaccination in the past 4 weeks? Please note:  No live vaccines while on biologic/chemotherapy until 6 months after the last treatment.  Patient can receive the flu vaccine (shot only) and the pneumovax.  It is optimal for the patient to get these vaccines mid cycle, but they can be given at any time as long as it is not on the day of the infusion. No  7. Have you recently been diagnosed with any new nervous system diseases (ie. Multiple  sclerosis, Guillain [place], seizures, neurological changes) or cancer diagnosis? No  8. Are you on any form of radiation or chemotherapy? No  9. Are you pregnant or breast feeding or do you have plans of pregnancy in the future? No  10. Have you been having any signs of worsening depression or suicidal ideations?  (benlysta only) No  11. Have there been any other new onset medical symptoms? No        Post Infusion Assessment:  Patient tolerated infusion without incident.  Site patent and intact, free from redness, edema or discomfort.       Discharge Plan:   Patient declined prescription refills.  Discharge instructions reviewed with: Patient.  Patient and/or family verbalized understanding of discharge instructions and all questions answered.  Copy of AVS reviewed with patient and/or family.  Patient will return 9/12/19 for next appointment.  Patient discharged in stable condition accompanied by: self.  Departure Mode: Ambulatory.    Jocelyn Riggs, RN, RN

## 2019-09-06 ENCOUNTER — TRANSFERRED RECORDS (OUTPATIENT)
Dept: HEALTH INFORMATION MANAGEMENT | Facility: CLINIC | Age: 79
End: 2019-09-06

## 2019-09-11 ENCOUNTER — OFFICE VISIT (OUTPATIENT)
Dept: CARDIOLOGY | Facility: CLINIC | Age: 79
End: 2019-09-11
Payer: MEDICARE

## 2019-09-11 ENCOUNTER — ANCILLARY PROCEDURE (OUTPATIENT)
Dept: BONE DENSITY | Facility: CLINIC | Age: 79
End: 2019-09-11
Attending: NURSE PRACTITIONER
Payer: MEDICARE

## 2019-09-11 ENCOUNTER — HOSPITAL ENCOUNTER (OUTPATIENT)
Dept: MAMMOGRAPHY | Facility: CLINIC | Age: 79
Discharge: HOME | End: 2019-09-11
Attending: NURSE PRACTITIONER | Admitting: NURSE PRACTITIONER
Payer: MEDICARE

## 2019-09-11 VITALS
HEIGHT: 72 IN | HEART RATE: 79 BPM | DIASTOLIC BLOOD PRESSURE: 56 MMHG | BODY MASS INDEX: 19.64 KG/M2 | WEIGHT: 145 LBS | SYSTOLIC BLOOD PRESSURE: 105 MMHG

## 2019-09-11 DIAGNOSIS — Z12.31 ENCOUNTER FOR SCREENING MAMMOGRAM FOR MALIGNANT NEOPLASM OF BREAST: ICD-10-CM

## 2019-09-11 DIAGNOSIS — Z13.6 CARDIOVASCULAR SCREENING; LDL GOAL LESS THAN 160: ICD-10-CM

## 2019-09-11 DIAGNOSIS — Z13.820 SCREENING FOR OSTEOPOROSIS: ICD-10-CM

## 2019-09-11 DIAGNOSIS — R06.09 DYSPNEA ON EXERTION: Primary | ICD-10-CM

## 2019-09-11 DIAGNOSIS — Z78.0 ASYMPTOMATIC MENOPAUSAL STATE: ICD-10-CM

## 2019-09-11 PROCEDURE — 77080 DXA BONE DENSITY AXIAL: CPT | Performed by: INTERNAL MEDICINE

## 2019-09-11 PROCEDURE — 77063 BREAST TOMOSYNTHESIS BI: CPT

## 2019-09-11 PROCEDURE — 99203 OFFICE O/P NEW LOW 30 MIN: CPT | Performed by: INTERNAL MEDICINE

## 2019-09-11 ASSESSMENT — MIFFLIN-ST. JEOR: SCORE: 1249.72

## 2019-09-11 NOTE — LETTER
9/11/2019      Emily Church MD   [address] [facility] [address]  [facility] [address]      RE: Tricia Sosa       Dear Colleague,    I had the pleasure of seeing Tricia Sosa in the [facility].    Service Date: 09/11/2019      HISTORY OF PRESENT ILLNESS:    It was my pleasure seeing Ms. Tricia Sosa for evaluation of dyspnea on exertion.  She is a delightful 78-year-old female with chronic autoimmune hemolytic anemia, hypothyroidism and chronic dyspnea on exertion.  She has been followed by Dr. Summers of Hematology.  Her hemoglobin lately has lately been around 8 to 9 g/dl.  She receives monthly treatments for hemolytic anemia.  Dr. Summers requested Pulmonary and Cardiology evaluation to rule out other contributors to her dyspnea.  The patient already saw Dr. Mejia at [facility].  Dr. Mejia felt that she had mild lung disease (mild bronchiectasis and mild asthma) which does not explain her significant dyspnea.      The patient does not have history of cardiac disease or hypertension.  In 2015, she had an exercise nuclear stress test at [facility].  This was normal, albeit at a low double product (the patient could not exercise over 4 minutes because of dyspnea).  Her ejection fraction was normal.  More recently, she had an essentially normal echocardiogram.      The patient does not have any hint of angina with exertion.  She has no orthopnea or PND.  Her symptom is dyspnea going up stairs.  She has moved into a new house with her son and the bedroom is upstairs.  Some days, even one flight of stairs is difficult for her.  She does not walk regularly and she tells me that her balance has been off as well.  She has never had syncope and does not have sudden onset tachycardia.      She does not have family history of early onset heart disease.  She is a lifelong nonsmoker and drinks alcohol socially.      PHYSICAL EXAMINATION:   VITAL SIGNS:  Blood pressure 105/56, pulse 79 at rest.  I  went for an approximately 90-yard walk with the patient in the hallways.  Her heart rate accelerated to 98 beats per minute.  Her room air saturation stayed between 97% and 99% throughout.  She did not appear visibly short of breath.   GENERAL:  She is a pleasant, slender woman in no distress.   HEENT:  Normocephalic, atraumatic.   NECK:  Supple, without apparent thyromegaly, lymphadenopathy, no carotid bruits.   LUNGS:  Clear.  No crackles or wheezes.   CARDIOVASCULAR:  Normal JVP, regular rhythm.  No gallop, murmur or rub.   ABDOMEN:  Soft, nontender.  Negative HJR.   EXTREMITIES:  No edema.   SKIN:  No rashes.   BACK:  No CVA tenderness.   NEUROLOGIC:  When she walks, she veers toward the left.  She had to hold onto the wall as we were walking.  She tells me this has been the case for a while.  She has not had neurologic evaluation.      DIAGNOSTIC STUDIES:     - Sodium 140, potassium 4.2, creatinine 0.68.  .  Troponin I less than 0.015.  Hematocrit 26%, hemoglobin 8.5, white count 5700.   - Her ECG from 08/23/2019 showed sinus rhythm with PACs in a pattern of bigeminy, otherwise normal.   - Her echocardiogram from 08/2019 showed normal LV function with EF of 55%-60%, no wall motion abnormalities, normal right ventricle, normal IVC.  Trivial age-related valvular abnormalities including mild aortic sclerosis without stenosis and mild tricuspid regurgitation, normal estimated pulmonary artery systolic pressure.        IMPRESSION:    1.  Dyspnea on exertion.  I do not see an obvious cardiac cause of dyspnea in Ms. Sosa.  She has an essentially normal echocardiogram for age.  Her resting heart rate is normal and accelerated normally during mild exertion today.  She does not appear to have chronotropic incompetence.  She had a normal nuclear stress test at [place] in 2015 and has no angina to prompt me to repeat this study.          It appears that her main cause for her chronic dyspnea is anemia.         RECOMMENDATIONS:    Reassurance.  No further cardiac testing is recommended at this time.      Thank you for the opportunity to be part of her care.        NELDA LEWIS MD, [facility]         cc:      Lisa Mejia MD    [facility]   [address]   [place] [address]       Emily Church MD    [facility]   [address] [facility]   [place] [address]      Jose Summers MD     [facility]   [address],    [place] [address]          D: 2019   T: 2019   MT:       Name:     JC MARRUFO   MRN:      [id number]        Account:      [id number]   :      1940           Service Date: 2019      Document: [id number]           Outpatient Encounter Medications as of 2019   Medication Sig Dispense Refill     cyanocobalamin (VITAMIN  B-12) 1000 MCG tablet   3     fluocinolone (SYNALAR) 0.01 % external cream Apply topically 2 times daily 15 g 3     folic acid (FOLVITE) 1 MG tablet   3     ketoconazole (NIZORAL) 2 % external shampoo Use every other day to scalp as needed 120 mL 11     levothyroxine (SYNTHROID/LEVOTHROID) 150 MCG tablet Take 1 tablet (150 mcg) by mouth daily 90 tablet 0     sulfamethoxazole-trimethoprim (BACTRIM DS/SEPTRA DS) 800-160 MG per tablet Take 1 pill orally on Mon, Wed and Friday 45 tablet 3     UNABLE TO FIND privigen inj monthly       ketoconazole 1 % shampoo Use every 3 days as directed (Patient not taking: Reported on 2019) 200 mL 11     predniSONE (DELTASONE) 20 MG tablet Take 3 tablets (60 mg) by mouth daily (Patient not taking: Reported on 2019) 15 tablet 0     No facility-administered encounter medications on file as of 2019.                  Again, thank you for allowing me to participate in the care of your patient.      Sincerely,    Nelda Lewis MD     [facility]

## 2019-09-11 NOTE — PROGRESS NOTES
[id number] and Plan:   See dictation    No orders of the defined types were placed in this encounter.      Orders Placed This Encounter   Medications     UNABLE TO FIND     Sig: privigen inj monthly       There are no discontinued medications.      No diagnosis found.    CURRENT MEDICATIONS:  Current Outpatient Medications   Medication Sig Dispense Refill     cyanocobalamin (VITAMIN  B-12) 1000 MCG tablet   3     fluocinolone (SYNALAR) 0.01 % external cream Apply topically 2 times daily 15 g 3     folic acid (FOLVITE) 1 MG tablet   3     ketoconazole (NIZORAL) 2 % external shampoo Use every other day to scalp as needed 120 mL 11     levothyroxine (SYNTHROID/LEVOTHROID) 150 MCG tablet Take 1 tablet (150 mcg) by mouth daily 90 tablet 0     sulfamethoxazole-trimethoprim (BACTRIM DS/SEPTRA DS) 800-160 MG per tablet Take 1 pill orally on Mon, Wed and Friday 45 tablet 3     UNABLE TO FIND privigen inj monthly       ketoconazole 1 % shampoo Use every 3 days as directed (Patient not taking: Reported on 9/11/2019) 200 mL 11     predniSONE (DELTASONE) 20 MG tablet Take 3 tablets (60 mg) by mouth daily (Patient not taking: Reported on 9/11/2019) 15 tablet 0       ALLERGIES     Allergies   Allergen Reactions     Doxycycline        PAST MEDICAL HISTORY:  Past Medical History:   Diagnosis Date     WARD (dyspnea on exertion)      External hemorrhoids without mention of complication 6/27/05     Hemolytic anemia (H)     autoimmune     Hypothyroidism      IgA deficiency (H)      Other malaise and fatigue      Other severe protein-calorie malnutrition      Thyroid disease      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, and posterior vitreous detachment     Urinary tract infection, site not specified     Recurrent UTI's       PAST SURGICAL HISTORY:  Past Surgical History:   Procedure Laterality Date     C LIGATE FALLOPIAN TUBE       COLONOSCOPY N/A 4/26/2016    Procedure: COLONOSCOPY;  Surgeon: Quang  Dontae YI MD;  Location:  GI     HC COLONOSCOPY W BIOPSY  00     HC COLONOSCOPY W BIOPSY  10/8/03     HC COLONOSCOPY W BIOPSY  05    REPEAT IN 5 YEARS     HC CYSTOSCOPY,INSERT URETERAL STENT      Ureteral stent insertion     HC FLEX SIGMOIDOSCOPY W BIOPSY  00     HC LAPAROSCOPY, SURGICAL; CHOLECYSTECTOMY        THYROIDECTOMY       LIGATION OF HEMORRHOID(S)      Hemorrhoidectomy     UPPER GI ENDOSCOPY,BIOPSY  10/01/01       FAMILY HISTORY:  Family History   Problem Relation Age of Onset     Colon Cancer Mother 90     Cerebrovascular Disease Father          at age 85     Dementia Brother      Dementia Brother      Pancreatic Cancer Brother      Breast Cancer Sister        SOCIAL HISTORY:  Social History     Socioeconomic History     Marital status:      Spouse name: Not on file     Number of children: Not on file     Years of education: Not on file     Highest education level: Not on file   Occupational History     Not on file   Social Needs     Financial resource strain: Not on file     Food insecurity:     Worry: Not on file     Inability: Not on file     Transportation needs:     Medical: Not on file     Non-medical: Not on file   Tobacco Use     Smoking status: Never Smoker     Smokeless tobacco: Never Used   Substance and Sexual Activity     Alcohol use: No     Alcohol/week: 0.0 oz     Frequency: Never     Drug use: No     Sexual activity: Never   Lifestyle     Physical activity:     Days per week: Not on file     Minutes per session: Not on file     Stress: Not on file   Relationships     Social connections:     Talks on phone: Not on file     Gets together: Not on file     Attends [employer] service: Not on file     Active member of club or organization: Not on file     Attends meetings of clubs or organizations: Not on file     Relationship status: Not on file     Intimate partner violence:     Fear of current or ex partner: Not on file     Emotionally abused: Not on file      Physically abused: Not on file     Forced sexual activity: Not on file   Other Topics Concern     Parent/sibling w/ CABG, MI or angioplasty before 65F 55M? Not Asked   Social History Narrative     Not on file       Review of Systems:  Skin:  Positive for bruising     Eyes:  Negative for      ENT:  Positive for hearing loss    Respiratory:  Positive for dyspnea on exertion     Cardiovascular:    fatigue;Positive for    Gastroenterology: Positive for heartburn    Genitourinary:  not assessed      Musculoskeletal:  Negative for      Neurologic:  Positive for numbness or tingling of feet    Psychiatric:  Negative for      Heme/Lymph/Imm:  Negative for      Endocrine:  Positive for thyroid disorder      [id number]

## 2019-09-11 NOTE — PROGRESS NOTES
Service Date: 09/11/2019      HISTORY OF PRESENT ILLNESS:    It was my pleasure seeing Ms. Tricia Sosa for evaluation of dyspnea on exertion.  She is a delightful 78-year-old female with chronic autoimmune hemolytic anemia, hypothyroidism and chronic dyspnea on exertion.  She has been followed by Dr. Summers of Hematology.  Her hemoglobin lately has lately been around 8 to 9 g/dl.  She receives monthly treatments for hemolytic anemia.  Dr. Summers requested Pulmonary and Cardiology evaluation to rule out other contributors to her dyspnea.  The patient already saw Dr. Mejia at [facility].  Dr. Mejia felt that she had mild lung disease (mild bronchiectasis and mild asthma) which does not explain her significant dyspnea.      The patient does not have history of cardiac disease or hypertension.  In 2015, she had an exercise nuclear stress test at [facility].  This was normal, albeit at a low double product (the patient could not exercise over 4 minutes because of dyspnea).  Her ejection fraction was normal.  More recently, she had an essentially normal echocardiogram.      The patient does not have any hint of angina with exertion.  She has no orthopnea or PND.  Her symptom is dyspnea going up stairs.  She has moved into a new house with her son and the bedroom is upstairs.  Some days, even one flight of stairs is difficult for her.  She does not walk regularly and she tells me that her balance has been off as well.  She has never had syncope and does not have sudden onset tachycardia.      She does not have family history of early onset heart disease.  She is a lifelong nonsmoker and drinks alcohol socially.      PHYSICAL EXAMINATION:   VITAL SIGNS:  Blood pressure 105/56, pulse 79 at rest.  I went for an approximately 90-yard walk with the patient in the hallways.  Her heart rate accelerated to 98 beats per minute.  Her room air saturation stayed between 97% and 99% throughout.  She did not appear visibly short of  breath.   GENERAL:  She is a pleasant, slender woman in no distress.   HEENT:  Normocephalic, atraumatic.   NECK:  Supple, without apparent thyromegaly, lymphadenopathy, no carotid bruits.   LUNGS:  Clear.  No crackles or wheezes.   CARDIOVASCULAR:  Normal JVP, regular rhythm.  No gallop, murmur or rub.   ABDOMEN:  Soft, nontender.  Negative HJR.   EXTREMITIES:  No edema.   SKIN:  No rashes.   BACK:  No CVA tenderness.   NEUROLOGIC:  When she walks, she veers toward the left.  She had to hold onto the wall as we were walking.  She tells me this has been the case for a while.  She has not had neurologic evaluation.      DIAGNOSTIC STUDIES:     - Sodium 140, potassium 4.2, creatinine 0.68.  .  Troponin I less than 0.015.  Hematocrit 26%, hemoglobin 8.5, white count 5700.   - Her ECG from 08/23/2019 showed sinus rhythm with PACs in a pattern of bigeminy, otherwise normal.   - Her echocardiogram from 08/2019 showed normal LV function with EF of 55%-60%, no wall motion abnormalities, normal right ventricle, normal IVC.  Trivial age-related valvular abnormalities including mild aortic sclerosis without stenosis and mild tricuspid regurgitation, normal estimated pulmonary artery systolic pressure.        IMPRESSION:    1.  Dyspnea on exertion.  I do not see an obvious cardiac cause of dyspnea in Ms. Sosa.  She has an essentially normal echocardiogram for age.  Her resting heart rate is normal and accelerated normally during mild exertion today.  She does not appear to have chronotropic incompetence.  She had a normal nuclear stress test at [place] in 2015 and has no angina to prompt me to repeat this study.          It appears that her main cause for her chronic dyspnea is anemia.        RECOMMENDATIONS:    Reassurance.  No further cardiac testing is recommended at this time.      Thank you for the opportunity to be part of her care.        NELDA LEWIS MD, [facility]         cc:      Lisa Mejia MD    [facility]   [address]   [place] [address]       Emily Church MD    [facility]   [address]   [place] [address]      Jose Summers MD     [facility]   [address] [facility],    [place] [address]          D: 2019   T: 2019   MT: YURIDIA      Name:     JC MARRUFO   MRN:      [id number]        Account:      [id number]   :      1940           Service Date: 2019      Document: [id number]

## 2019-09-11 NOTE — LETTER
9/11/2019    Emily Church MD  [address]  [facility] [address]    RE: Tricia Sosa       Dear Colleague,    I had the pleasure of seeing Tricia Sosa in the [facility].    [id number] and Plan:   See dictation    No orders of the defined types were placed in this encounter.      Orders Placed This Encounter   Medications     UNABLE TO FIND     Sig: privigen inj monthly       There are no discontinued medications.      No diagnosis found.    CURRENT MEDICATIONS:  Current Outpatient Medications   Medication Sig Dispense Refill     cyanocobalamin (VITAMIN  B-12) 1000 MCG tablet   3     fluocinolone (SYNALAR) 0.01 % external cream Apply topically 2 times daily 15 g 3     folic acid (FOLVITE) 1 MG tablet   3     ketoconazole (NIZORAL) 2 % external shampoo Use every other day to scalp as needed 120 mL 11     levothyroxine (SYNTHROID/LEVOTHROID) 150 MCG tablet Take 1 tablet (150 mcg) by mouth daily 90 tablet 0     sulfamethoxazole-trimethoprim (BACTRIM DS/SEPTRA DS) 800-160 MG per tablet Take 1 pill orally on Mon, Wed and Friday 45 tablet 3     UNABLE TO FIND privigen inj monthly       ketoconazole 1 % shampoo Use every 3 days as directed (Patient not taking: Reported on 9/11/2019) 200 mL 11     predniSONE (DELTASONE) 20 MG tablet Take 3 tablets (60 mg) by mouth daily (Patient not taking: Reported on 9/11/2019) 15 tablet 0       ALLERGIES     Allergies   Allergen Reactions     Doxycycline        PAST MEDICAL HISTORY:  Past Medical History:   Diagnosis Date     WARD (dyspnea on exertion)      External hemorrhoids without mention of complication 6/27/05     Hemolytic anemia (H)     autoimmune     Hypothyroidism      IgA deficiency (H)      Other malaise and fatigue      Other severe protein-calorie malnutrition      Thyroid disease      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, and posterior vitreous detachment     Urinary tract infection, site not  specified     Recurrent UTI's       PAST SURGICAL HISTORY:  Past Surgical History:   Procedure Laterality Date     C LIGATE FALLOPIAN TUBE       COLONOSCOPY N/A 2016    Procedure: COLONOSCOPY;  Surgeon: Dontae Del Rio MD;  Location:  GI     HC COLONOSCOPY W BIOPSY  00     HC COLONOSCOPY W BIOPSY  10/8/03     HC COLONOSCOPY W BIOPSY  05    REPEAT IN 5 YEARS     HC CYSTOSCOPY,INSERT URETERAL STENT      Ureteral stent insertion     HC FLEX SIGMOIDOSCOPY W BIOPSY  00      LAPAROSCOPY, SURGICAL; CHOLECYSTECTOMY        THYROIDECTOMY       LIGATION OF HEMORRHOID(S)      Hemorrhoidectomy     UPPER GI ENDOSCOPY,BIOPSY  10/01/01       FAMILY HISTORY:  Family History   Problem Relation Age of Onset     Colon Cancer Mother 90     Cerebrovascular Disease Father          at age 85     Dementia Brother      Dementia Brother      Pancreatic Cancer Brother      Breast Cancer Sister        SOCIAL HISTORY:  Social History     Socioeconomic History     Marital status:      Spouse name: Not on file     Number of children: Not on file     Years of education: Not on file     Highest education level: Not on file   Occupational History     Not on file   Social Needs     Financial resource strain: Not on file     Food insecurity:     Worry: Not on file     Inability: Not on file     Transportation needs:     Medical: Not on file     Non-medical: Not on file   Tobacco Use     Smoking status: Never Smoker     Smokeless tobacco: Never Used   Substance and Sexual Activity     Alcohol use: No     Alcohol/week: 0.0 oz     Frequency: Never     Drug use: No     Sexual activity: Never   Lifestyle     Physical activity:     Days per week: Not on file     Minutes per session: Not on file     Stress: Not on file   Relationships     Social connections:     Talks on phone: Not on file     Gets together: Not on file     Attends Hindu service: Not on file     Active member of club or organization: Not on file      Attends meetings of clubs or organizations: Not on file     Relationship status: Not on file     Intimate partner violence:     Fear of current or ex partner: Not on file     Emotionally abused: Not on file     Physically abused: Not on file     Forced sexual activity: Not on file   Other Topics Concern     Parent/sibling w/ CABG, MI or angioplasty before 65F 55M? Not Asked   Social History Narrative     Not on file       Review of Systems:  Skin:  Positive for bruising     Eyes:  Negative for      ENT:  Positive for hearing loss    Respiratory:  Positive for dyspnea on exertion     Cardiovascular:    fatigue;Positive for    Gastroenterology: Positive for heartburn    Genitourinary:  not assessed      Musculoskeletal:  Negative for      Neurologic:  Positive for numbness or tingling of feet    Psychiatric:  Negative for      Heme/Lymph/Imm:  Negative for      Endocrine:  Positive for thyroid disorder      [id number]                Thank you for allowing me to participate in the care of your patient.      Sincerely,     Evelina Shearer MD     [facility]    cc:   No referring provider defined for this encounter.

## 2019-09-12 ENCOUNTER — HOSPITAL ENCOUNTER (OUTPATIENT)
Facility: CLINIC | Age: 79
Setting detail: SPECIMEN
Discharge: HOME | End: 2019-09-12
Attending: INTERNAL MEDICINE | Admitting: INTERNAL MEDICINE
Payer: MEDICARE

## 2019-09-12 ENCOUNTER — INFUSION THERAPY VISIT (OUTPATIENT)
Dept: INFUSION THERAPY | Facility: CLINIC | Age: 79
End: 2019-09-12
Attending: INTERNAL MEDICINE
Payer: MEDICARE

## 2019-09-12 VITALS
BODY MASS INDEX: 19.88 KG/M2 | DIASTOLIC BLOOD PRESSURE: 57 MMHG | WEIGHT: 146.6 LBS | RESPIRATION RATE: 16 BRPM | TEMPERATURE: 98 F | SYSTOLIC BLOOD PRESSURE: 118 MMHG | HEART RATE: 60 BPM | OXYGEN SATURATION: 98 %

## 2019-09-12 DIAGNOSIS — D83.9 CVID (COMMON VARIABLE IMMUNODEFICIENCY) (H): ICD-10-CM

## 2019-09-12 DIAGNOSIS — D59.19 OTHER AUTOIMMUNE HEMOLYTIC ANEMIAS: Primary | ICD-10-CM

## 2019-09-12 LAB
ANISOCYTOSIS BLD QL SMEAR: SLIGHT
BASO STIPL BLD QL SMEAR: PRESENT
BASOPHILS # BLD AUTO: 0 10E9/L (ref 0–0.2)
BASOPHILS NFR BLD AUTO: 0 %
DIFFERENTIAL METHOD BLD: ABNORMAL
EOSINOPHIL # BLD AUTO: 0.1 10E9/L (ref 0–0.7)
EOSINOPHIL NFR BLD AUTO: 3 %
ERYTHROCYTE [DISTWIDTH] IN BLOOD BY AUTOMATED COUNT: 16 % (ref 10–15)
HCT VFR BLD AUTO: 25.2 % (ref 35–47)
HGB BLD-MCNC: 8.3 G/DL (ref 11.7–15.7)
LYMPHOCYTES # BLD AUTO: 1.2 10E9/L (ref 0.8–5.3)
LYMPHOCYTES NFR BLD AUTO: 24 %
MACROCYTES BLD QL SMEAR: PRESENT
MCH RBC QN AUTO: 35.6 PG (ref 26.5–33)
MCHC RBC AUTO-ENTMCNC: 32.9 G/DL (ref 31.5–36.5)
MCV RBC AUTO: 108 FL (ref 78–100)
METAMYELOCYTES # BLD: 0.2 10E9/L
METAMYELOCYTES NFR BLD MANUAL: 4 %
MONOCYTES # BLD AUTO: 0.6 10E9/L (ref 0–1.3)
MONOCYTES NFR BLD AUTO: 13 %
MYELOCYTES # BLD: 0.1 10E9/L
MYELOCYTES NFR BLD MANUAL: 2 %
NEUTROPHILS # BLD AUTO: 2.6 10E9/L (ref 1.6–8.3)
NEUTROPHILS NFR BLD AUTO: 54 %
PLATELET # BLD AUTO: 54 10E9/L (ref 150–450)
PLATELET # BLD EST: ABNORMAL 10*3/UL
POLYCHROMASIA BLD QL SMEAR: SLIGHT
RBC # BLD AUTO: 2.33 10E12/L (ref 3.8–5.2)
WBC # BLD AUTO: 4.9 10E9/L (ref 4–11)

## 2019-09-12 PROCEDURE — 25000128 H RX IP 250 OP 636: Performed by: INTERNAL MEDICINE

## 2019-09-12 PROCEDURE — 96415 CHEMO IV INFUSION ADDL HR: CPT

## 2019-09-12 PROCEDURE — 85025 COMPLETE CBC W/AUTO DIFF WBC: CPT | Performed by: INTERNAL MEDICINE

## 2019-09-12 PROCEDURE — 96413 CHEMO IV INFUSION 1 HR: CPT

## 2019-09-12 PROCEDURE — 25000132 ZZH RX MED GY IP 250 OP 250 PS 637: Mod: GY | Performed by: INTERNAL MEDICINE

## 2019-09-12 PROCEDURE — 25800030 ZZH RX IP 258 OP 636: Performed by: INTERNAL MEDICINE

## 2019-09-12 RX ORDER — ACETAMINOPHEN 325 MG/1
650 TABLET ORAL ONCE
Status: COMPLETED | OUTPATIENT
Start: 2019-09-12 | End: 2019-09-12

## 2019-09-12 RX ORDER — DIPHENHYDRAMINE HCL 25 MG
50 CAPSULE ORAL ONCE
Status: COMPLETED | OUTPATIENT
Start: 2019-09-12 | End: 2019-09-12

## 2019-09-12 RX ADMIN — ACETAMINOPHEN 650 MG: 325 TABLET ORAL at 10:00

## 2019-09-12 RX ADMIN — RITUXIMAB 700 MG: 10 INJECTION, SOLUTION INTRAVENOUS at 10:13

## 2019-09-12 RX ADMIN — DIPHENHYDRAMINE HYDROCHLORIDE 50 MG: 25 CAPSULE ORAL at 10:00

## 2019-09-12 RX ADMIN — SODIUM CHLORIDE 250 ML: 9 INJECTION, SOLUTION INTRAVENOUS at 10:10

## 2019-09-12 NOTE — PROGRESS NOTES
Infusion Nursing Note:  Tricia Sosa presents today for Rituxan.    Patient seen by provider today: No   present during visit today: Not Applicable.    Note: Rituxan increased by 100 mL/hr every 30 min with a max of 400 mL/hr.    Platelets 54.  Order parameters say to give Rituxan if platelets over 75.  Dr. Summers notified.  Per Dr. Summers, ok to give rituxan today.      Intravenous Access:  Labs drawn without difficulty.  Peripheral IV placed.    Treatment Conditions:    Lab Results   Component Value Date    HGB 8.3 09/12/2019     Lab Results   Component Value Date    WBC 4.9 09/12/2019      Lab Results   Component Value Date    ANEU 2.6 09/12/2019     Lab Results   Component Value Date    PLT 54 09/12/2019      Results reviewed, labs did NOT meet treatment parameters, but ok to give Rituxan today per Dr. Summers.      Biological Infusion Checklist:  ~~~ NOTE: If the patient answers yes to any of the questions below, hold the infusion and contact ordering provider or on-call provider.    1. Have you recently had an elevated temperature, fever, chills, productive cough, coughing for 3 weeks or longer or hemoptysis, abnormal vital signs, night sweats,  chest pain or have you noticed a decrease in your appetite, unexplained weight loss or fatigue? No  2. Do you have any open wounds or new incisions? No  3. Do you have any recent or upcoming hospitalizations, surgeries or dental procedures? No  4. Do you currently have or recently have had any signs of illness or infection or are you on any antibiotics? No  5. Have you had any new, sudden or worsening abdominal pain? No  6. Have you or anyone in your household received a live vaccination in the past 4 weeks? Please note:  No live vaccines while on biologic/chemotherapy until 6 months after the last treatment.  Patient can receive the flu vaccine (shot only) and the pneumovax.  It is optimal for the patient to get these vaccines mid cycle, but they can be given at  any time as long as it is not on the day of the infusion. No  7. Have you recently been diagnosed with any new nervous system diseases (ie. Multiple sclerosis, Guillain Toponas, seizures, neurological changes) or cancer diagnosis? No  8. Are you on any form of radiation or chemotherapy? No  9. Are you pregnant or breast feeding or do you have plans of pregnancy in the future? No  10. Have you been having any signs of worsening depression or suicidal ideations?  (benlysta only) No  11. Have there been any other new onset medical symptoms? No    Post Infusion Assessment:  Patient tolerated infusion without incident.  Site patent and intact, free from redness, edema or discomfort.  No evidence of extravasations.  Access discontinued per protocol.    Biologic Infusion Post Education: Call the triage nurse at your clinic or seek medical attention if you have chills and/or temperature greater than or equal to 100.5, uncontrolled nausea/vomiting, diarrhea, constipation, dizziness, shortness of breath, chest pain, heart palpitations, weakness or any other new or concerning symptoms, questions or concerns.  You cannot have any live virus vaccines prior to or during treatment or up to 6 months post infusion.  If you have an upcoming surgery, medical procedure or dental procedure during treatment, this should be discussed with your ordering physician and your surgeon/dentist.  If you are having any concerning symptom, if you are unsure if you should get your next infusion or wish to speak to a provider before your next infusion, please call your care coordinator or triage nurse at your clinic to notify them so we can adequately serve you.       Discharge Plan:   AVS to patient via MokuHART.  Patient will return 9/19 for next appointment.   Patient discharged in stable condition accompanied by: self.  Departure Mode: Ambulatory.    Ally Eller, RN, RN

## 2019-09-19 ENCOUNTER — HOSPITAL ENCOUNTER (OUTPATIENT)
Facility: CLINIC | Age: 79
Setting detail: SPECIMEN
Discharge: HOME | End: 2019-09-19
Attending: INTERNAL MEDICINE | Admitting: NURSE PRACTITIONER
Payer: MEDICARE

## 2019-09-19 ENCOUNTER — INFUSION THERAPY VISIT (OUTPATIENT)
Dept: INFUSION THERAPY | Facility: CLINIC | Age: 79
End: 2019-09-19
Attending: INTERNAL MEDICINE
Payer: MEDICARE

## 2019-09-19 VITALS
RESPIRATION RATE: 16 BRPM | TEMPERATURE: 98.1 F | OXYGEN SATURATION: 99 % | DIASTOLIC BLOOD PRESSURE: 65 MMHG | SYSTOLIC BLOOD PRESSURE: 128 MMHG | HEART RATE: 69 BPM

## 2019-09-19 DIAGNOSIS — D59.19 OTHER AUTOIMMUNE HEMOLYTIC ANEMIAS: ICD-10-CM

## 2019-09-19 DIAGNOSIS — E53.8 B12 DEFICIENCY: ICD-10-CM

## 2019-09-19 DIAGNOSIS — D83.9 CVID (COMMON VARIABLE IMMUNODEFICIENCY) (H): ICD-10-CM

## 2019-09-19 DIAGNOSIS — Z13.6 CARDIOVASCULAR SCREENING; LDL GOAL LESS THAN 160: Primary | ICD-10-CM

## 2019-09-19 LAB
ALBUMIN SERPL-MCNC: 3.7 G/DL (ref 3.4–5)
ALP SERPL-CCNC: 101 U/L (ref 40–150)
ALT SERPL W P-5'-P-CCNC: 37 U/L (ref 0–50)
ANION GAP SERPL CALCULATED.3IONS-SCNC: 7 MMOL/L (ref 3–14)
AST SERPL W P-5'-P-CCNC: 95 U/L (ref 0–45)
BASO STIPL BLD QL SMEAR: PRESENT
BASOPHILS # BLD AUTO: 0 10E9/L (ref 0–0.2)
BASOPHILS NFR BLD AUTO: 0 %
BILIRUB SERPL-MCNC: 1.7 MG/DL (ref 0.2–1.3)
BUN SERPL-MCNC: 13 MG/DL (ref 7–30)
CALCIUM SERPL-MCNC: 8 MG/DL (ref 8.5–10.1)
CHLORIDE SERPL-SCNC: 107 MMOL/L (ref 94–109)
CHOLEST SERPL-MCNC: 110 MG/DL
CO2 SERPL-SCNC: 27 MMOL/L (ref 20–32)
CREAT SERPL-MCNC: 0.77 MG/DL (ref 0.52–1.04)
DIFFERENTIAL METHOD BLD: ABNORMAL
EOSINOPHIL # BLD AUTO: 0 10E9/L (ref 0–0.7)
EOSINOPHIL NFR BLD AUTO: 0 %
ERYTHROCYTE [DISTWIDTH] IN BLOOD BY AUTOMATED COUNT: 14.9 % (ref 10–15)
GFR SERPL CREATININE-BSD FRML MDRD: 74 ML/MIN/{1.73_M2}
GLUCOSE SERPL-MCNC: 86 MG/DL (ref 70–99)
HAPTOGLOB SERPL-MCNC: <6 MG/DL (ref 35–175)
HCT VFR BLD AUTO: 24.9 % (ref 35–47)
HDLC SERPL-MCNC: 54 MG/DL
HGB BLD-MCNC: 8 G/DL (ref 11.7–15.7)
IGA SERPL-MCNC: <7 MG/DL (ref 70–380)
IGG SERPL-MCNC: 688 MG/DL (ref 695–1620)
IGM SERPL-MCNC: 294 MG/DL (ref 60–265)
LDH SERPL L TO P-CCNC: 970 U/L (ref 81–234)
LDLC SERPL CALC-MCNC: 42 MG/DL
LYMPHOCYTES # BLD AUTO: 0.5 10E9/L (ref 0.8–5.3)
LYMPHOCYTES NFR BLD AUTO: 12 %
MACROCYTES BLD QL SMEAR: PRESENT
MCH RBC QN AUTO: 34.3 PG (ref 26.5–33)
MCHC RBC AUTO-ENTMCNC: 32.1 G/DL (ref 31.5–36.5)
MCV RBC AUTO: 107 FL (ref 78–100)
METAMYELOCYTES # BLD: 0 10E9/L
METAMYELOCYTES NFR BLD MANUAL: 1 %
MONOCYTES # BLD AUTO: 0.4 10E9/L (ref 0–1.3)
MONOCYTES NFR BLD AUTO: 10 %
MYELOCYTES # BLD: 0.2 10E9/L
MYELOCYTES NFR BLD MANUAL: 4 %
NEUTROPHILS # BLD AUTO: 2.9 10E9/L (ref 1.6–8.3)
NEUTROPHILS NFR BLD AUTO: 73 %
NONHDLC SERPL-MCNC: 56 MG/DL
PLATELET # BLD AUTO: 54 10E9/L (ref 150–450)
PLATELET # BLD EST: ABNORMAL 10*3/UL
POLYCHROMASIA BLD QL SMEAR: SLIGHT
POTASSIUM SERPL-SCNC: 4 MMOL/L (ref 3.4–5.3)
PROT SERPL-MCNC: 6.2 G/DL (ref 6.8–8.8)
RBC # BLD AUTO: 2.33 10E12/L (ref 3.8–5.2)
RETICS # AUTO: 183.8 10E9/L (ref 25–95)
RETICS/RBC NFR AUTO: 7.9 % (ref 0.5–2)
SODIUM SERPL-SCNC: 141 MMOL/L (ref 133–144)
TRIGL SERPL-MCNC: 70 MG/DL
VIT B12 SERPL-MCNC: 487 PG/ML (ref 193–986)
WBC # BLD AUTO: 4 10E9/L (ref 4–11)

## 2019-09-19 PROCEDURE — 96413 CHEMO IV INFUSION 1 HR: CPT

## 2019-09-19 PROCEDURE — 82784 ASSAY IGA/IGD/IGG/IGM EACH: CPT | Performed by: NURSE PRACTITIONER

## 2019-09-19 PROCEDURE — 25000128 H RX IP 250 OP 636: Performed by: INTERNAL MEDICINE

## 2019-09-19 PROCEDURE — 83010 ASSAY OF HAPTOGLOBIN QUANT: CPT | Performed by: NURSE PRACTITIONER

## 2019-09-19 PROCEDURE — 25000132 ZZH RX MED GY IP 250 OP 250 PS 637: Mod: GY | Performed by: INTERNAL MEDICINE

## 2019-09-19 PROCEDURE — 80061 LIPID PANEL: CPT | Performed by: NURSE PRACTITIONER

## 2019-09-19 PROCEDURE — 82607 VITAMIN B-12: CPT | Performed by: NURSE PRACTITIONER

## 2019-09-19 PROCEDURE — 80053 COMPREHEN METABOLIC PANEL: CPT | Performed by: NURSE PRACTITIONER

## 2019-09-19 PROCEDURE — 85025 COMPLETE CBC W/AUTO DIFF WBC: CPT | Performed by: NURSE PRACTITIONER

## 2019-09-19 PROCEDURE — 96415 CHEMO IV INFUSION ADDL HR: CPT

## 2019-09-19 PROCEDURE — 83615 LACTATE (LD) (LDH) ENZYME: CPT | Performed by: NURSE PRACTITIONER

## 2019-09-19 PROCEDURE — 85045 AUTOMATED RETICULOCYTE COUNT: CPT | Performed by: NURSE PRACTITIONER

## 2019-09-19 PROCEDURE — 25800030 ZZH RX IP 258 OP 636: Performed by: INTERNAL MEDICINE

## 2019-09-19 RX ORDER — DIPHENHYDRAMINE HCL 25 MG
50 CAPSULE ORAL ONCE
Status: COMPLETED | OUTPATIENT
Start: 2019-09-19 | End: 2019-09-19

## 2019-09-19 RX ORDER — ACETAMINOPHEN 325 MG/1
650 TABLET ORAL ONCE
Status: COMPLETED | OUTPATIENT
Start: 2019-09-19 | End: 2019-09-19

## 2019-09-19 RX ADMIN — ACETAMINOPHEN 650 MG: 325 TABLET ORAL at 11:10

## 2019-09-19 RX ADMIN — DIPHENHYDRAMINE HYDROCHLORIDE 50 MG: 25 CAPSULE ORAL at 11:10

## 2019-09-19 RX ADMIN — SODIUM CHLORIDE 250 ML: 9 INJECTION, SOLUTION INTRAVENOUS at 11:10

## 2019-09-19 RX ADMIN — RITUXIMAB 700 MG: 10 INJECTION, SOLUTION INTRAVENOUS at 11:34

## 2019-09-19 NOTE — PROGRESS NOTES
Consulted Dr. Summers regarding Pt platelets are 54. Dr. Summers recommends proceed with Rituxan infusion this week and for the 4th and final infusion next week if platelets remain at or near 54.     Recommendation to proceed reported to Ally ALONSO RN.     KALI WestbrookN, RN, OCN  RN Cancer Care Coordinator  [facility]  [phone]

## 2019-09-19 NOTE — PROGRESS NOTES
Infusion Nursing Note:  Tricia Sosa presents today for Rituxan.    Patient seen by provider today: No   present during visit today: Not Applicable.    Note: feeling weak. No falls, no use of walker or cane.    Intravenous Access:  Lab draw site L arm, Needle type piv, Gauge 24.  Labs drawn without difficulty.  Peripheral IV placed.    Treatment Conditions:  Lab Results   Component Value Date    HGB 8.0 09/19/2019     Lab Results   Component Value Date    WBC 4.0 09/19/2019      Lab Results   Component Value Date    ANEU 2.9 09/19/2019     Lab Results   Component Value Date    PLT 54 09/19/2019      Results reviewed, labs MET treatment parameters, ok to proceed with treatment.  OK with Dr Summers to proceed with platelets 54.  See RN note 9/12    Post Infusion Assessment:  Patient tolerated infusion without incident.  Blood return noted pre and post infusion.  Site patent and intact, free from redness, edema or discomfort.  No evidence of extravasations.  Access discontinued per protocol.       Discharge Plan:   Discharge instructions reviewed with: Patient.  AVS to patient via sofatutorT.  Patient will return 9/20 for next appointment.   Patient discharged in stable condition accompanied by: self.  Departure Mode: Ambulatory.    Serina Otto, RN, RN

## 2019-09-20 ENCOUNTER — INFUSION THERAPY VISIT (OUTPATIENT)
Dept: INFUSION THERAPY | Facility: CLINIC | Age: 79
End: 2019-09-20
Attending: INTERNAL MEDICINE
Payer: MEDICARE

## 2019-09-20 ENCOUNTER — ONCOLOGY VISIT (OUTPATIENT)
Dept: ONCOLOGY | Facility: CLINIC | Age: 79
End: 2019-09-20
Attending: INTERNAL MEDICINE
Payer: MEDICARE

## 2019-09-20 ENCOUNTER — HOSPITAL ENCOUNTER (OUTPATIENT)
Facility: CLINIC | Age: 79
Setting detail: SPECIMEN
End: 2019-09-20
Attending: INTERNAL MEDICINE
Payer: MEDICARE

## 2019-09-20 VITALS
RESPIRATION RATE: 16 BRPM | HEIGHT: 72 IN | TEMPERATURE: 97.4 F | HEART RATE: 77 BPM | SYSTOLIC BLOOD PRESSURE: 129 MMHG | WEIGHT: 147 LBS | OXYGEN SATURATION: 98 % | DIASTOLIC BLOOD PRESSURE: 46 MMHG | BODY MASS INDEX: 19.91 KG/M2

## 2019-09-20 DIAGNOSIS — D83.9 CVID (COMMON VARIABLE IMMUNODEFICIENCY) (H): Primary | ICD-10-CM

## 2019-09-20 DIAGNOSIS — E53.8 B12 DEFICIENCY: ICD-10-CM

## 2019-09-20 DIAGNOSIS — D83.9 CVID (COMMON VARIABLE IMMUNODEFICIENCY) (H): ICD-10-CM

## 2019-09-20 DIAGNOSIS — D59.19 OTHER AUTOIMMUNE HEMOLYTIC ANEMIAS: ICD-10-CM

## 2019-09-20 PROCEDURE — 96375 TX/PRO/DX INJ NEW DRUG ADDON: CPT

## 2019-09-20 PROCEDURE — G0463 HOSPITAL OUTPT CLINIC VISIT: HCPCS

## 2019-09-20 PROCEDURE — 96365 THER/PROPH/DIAG IV INF INIT: CPT

## 2019-09-20 PROCEDURE — 25000128 H RX IP 250 OP 636: Performed by: INTERNAL MEDICINE

## 2019-09-20 PROCEDURE — 96366 THER/PROPH/DIAG IV INF ADDON: CPT

## 2019-09-20 PROCEDURE — 99214 OFFICE O/P EST MOD 30 MIN: CPT | Performed by: INTERNAL MEDICINE

## 2019-09-20 RX ORDER — HEPARIN SODIUM (PORCINE) LOCK FLUSH IV SOLN 100 UNIT/ML 100 UNIT/ML
5 SOLUTION INTRAVENOUS
Status: CANCELLED | OUTPATIENT
Start: 2019-09-20

## 2019-09-20 RX ORDER — HEPARIN SODIUM,PORCINE 10 UNIT/ML
5 VIAL (ML) INTRAVENOUS
Status: CANCELLED | OUTPATIENT
Start: 2019-09-20

## 2019-09-20 RX ORDER — ACETAMINOPHEN 325 MG/1
650 TABLET ORAL ONCE
Status: CANCELLED
Start: 2019-10-14

## 2019-09-20 RX ORDER — PREDNISONE 20 MG/1
TABLET ORAL
Qty: 70 TABLET | Refills: 0 | Status: SHIPPED | OUTPATIENT
Start: 2019-09-20 | End: 2020-03-16

## 2019-09-20 RX ORDER — DIPHENHYDRAMINE HCL 25 MG
50 CAPSULE ORAL ONCE
Status: CANCELLED | OUTPATIENT
Start: 2019-10-14

## 2019-09-20 RX ADMIN — HYDROCORTISONE SODIUM SUCCINATE 100 MG: 100 INJECTION, POWDER, FOR SOLUTION INTRAMUSCULAR; INTRAVENOUS at 11:14

## 2019-09-20 RX ADMIN — HUMAN IMMUNOGLOBULIN G 35 G: 20 LIQUID INTRAVENOUS at 11:17

## 2019-09-20 ASSESSMENT — MIFFLIN-ST. JEOR: SCORE: 1258.79

## 2019-09-20 ASSESSMENT — PAIN SCALES - GENERAL: PAINLEVEL: NO PAIN (0)

## 2019-09-20 NOTE — LETTER
9/20/2019         RE: Tricia Sosa  [address]  [facility] [facility] [place] [facility] [address]        Dear Colleague,    Thank you for referring your patient, Tricia Sosa, to the [facility]. Please see a copy of my visit note below.    [facility]  FOLLOW-UP VISIT NOTE    PATIENT NAME: Tricia Sosa MRN # [id number]  DATE OF VISIT: Sep 20, 2019 YOB: 1940    REFERRING PROVIDER: No referring provider defined for this encounter.    DIAGNOSIS: Hemolytic anemia-autoimmune; IgA deficiency    TREATMENT SUMMARY:  Tricia has been diagnosed with IgA deficiency since her around 2000.  She was very sick at the time and had severe diarrhea.  She lost about 40 pounds in weight.  The workup revealed that she had markedly diminished CD4 counts of 48 and was diagnosed with CMV colitis.  Since then she has been followed in hematology clinic at [place] until recently.  She was noted to have anemia which was fairly long-standing.  She was initially referred for anemia in 2004 and also had a bone marrow biopsy done at that time which revealed hypercellular bone marrow with 70-80% cellularity and normal trilineage hematopoiesis and no morphologic features of MDS or lymphoproliferation.  Her anemia was attributed to her chronic underlying disease.  At the next evaluation in 2002 on 4 aggressive anemia there was no evidence of hemolysis, iron deficiency, B12 or folate deficiency.  Bone marrow biopsy was not pursued.  In summer of 2015 she had progressive fatigue, dyspnea on exertion and worsening anemia with a hemoglobin now of 9.  Workup at this time did suggest a hemolytic anemia with elevated LDH at 709, undetectable haptoglobin and elevated unconjugated bilirubin at 3.3.  Monospecific MARIKA was positive for both IgG and complement.  Cold agglutinin screen was positive with very low titer 1:64.  She was started on prednisone 60 mg daily with supplementation of iron folate and B12  starting 7/31/15.  Her prednisone has been slowly tapered and was discontinued off in January 2016.  She was last followed at [facility] on March 10 when she had stable hemoglobin.    Chemotherapy 8/28/2019 9/4/2019 9/12/2019 9/19/2019   Day, Cycle Day 1, Cycle 1 Day 8, Cycle 1 Day 15, Cycle 1 Day 22, Cycle 1   riTUXimab (RITUXAN)  375 mg/m2 375 mg/m2 375 mg/m2 375 mg/m2     SUBJECTIVE   Tricia comes alone for this clinic visit for her hemolytic anemia.    Tricia comes in for a scheduled follow up. She has marked fatigue. She has complete 4 weekly doses of rituximab on 9/19/19.    Wt Readings from Last 10 Encounters:   09/20/19 66.7 kg (147 lb)   09/12/19 66.5 kg (146 lb 9.6 oz)   09/11/19 65.8 kg (145 lb)   09/04/19 65.3 kg (144 lb)   08/28/19 66 kg (145 lb 9.6 oz)   08/23/19 65.5 kg (144 lb 6.4 oz)   08/20/19 66.1 kg (145 lb 12.8 oz)   08/08/19 65.5 kg (144 lb 4.8 oz)   08/05/19 65.8 kg (145 lb)   07/23/19 66 kg (145 lb 9.6 oz)       PAST MEDICAL HISTORY   1. Common variable immunodeficiency syndrome  2. 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 Selective IgA deficiency  4. Leukopenia with markedly low CD4 counts on Bactrim prophylaxis  5. History of fall with subdural hematoma in 6/11/14 with complete resolution  6. Hashimoto's disease status post partial thyroidectomy      CURRENT OUTPATIENT MEDICATIONS     Current Outpatient Medications   Medication Sig     cyanocobalamin (VITAMIN  B-12) 1000 MCG tablet      fluocinolone (SYNALAR) 0.01 % external cream Apply topically 2 times daily     folic acid (FOLVITE) 1 MG tablet      ketoconazole (NIZORAL) 2 % external shampoo Use every other day to scalp as needed     levothyroxine (SYNTHROID/LEVOTHROID) 150 MCG tablet Take 1 tablet (150 mcg) by mouth daily     sulfamethoxazole-trimethoprim (BACTRIM DS/SEPTRA DS) 800-160 MG per tablet Take 1 pill orally on Mon, Wed and Friday     UNABLE TO FIND privigen inj monthly      predniSONE (DELTASONE) 20 MG tablet Take 3 tablets (60 mg) by mouth daily (Patient not taking: Reported on 9/11/2019)     No current facility-administered medications for this visit.         ALLERGIES     Allergies   Allergen Reactions     Doxycycline         REVIEW OF SYSTEMS   As above in the HPI, o/w complete 12-point ROS was negative.     PHYSICAL EXAM   /46   Pulse 77   Temp 97.4  F (36.3  C) (Tympanic)   Resp 16   Ht 1.829 m (6')   Wt 66.7 kg (147 lb)   LMP  (LMP Unknown)   SpO2 98%   BMI 19.94 kg/m       SpO2 Readings from Last 4 Encounters:   11/13/18 99%   10/16/18 99%   09/27/18 98%   09/19/18 100%     Wt Readings from Last 3 Encounters:   09/20/19 66.7 kg (147 lb)   09/12/19 66.5 kg (146 lb 9.6 oz)   09/11/19 65.8 kg (145 lb)     GEN: NAD  HEENT: PERRL, EOMI, no icterus, injection or pallor. Oropharynx is clear.  NECK: no cervical or supraclavicular lymphadenopathy  LUNGS: clear bilaterally  CV: regular, no murmurs, rubs, or gallops  ABDOMEN: soft, non-tender, non-distended, normal bowel sounds, no hepatosplenomegaly by percussion or palpation  EXT: warm, well perfused, no edema  NEURO: alert  SKIN: no rashes     LABORATORY AND IMAGING STUDIES     Recent Labs   Lab Test 09/19/19  0940 08/23/19  0954 08/20/19  1050 07/16/19  1000 04/23/19  0944    140 136 138 141   POTASSIUM 4.0 4.2 4.4 3.9 3.9   CHLORIDE 107 109 106 106 107   CO2 27 28 26 29 27   ANIONGAP 7 3 4 3 7   BUN 13 14 16 14 14   CR 0.77 0.68 0.72 0.65 0.69   GLC 86 89 75 91 86   CULLEN 8.0* 8.1* 8.2* 8.1* 8.7     No results for input(s): MAG, PHOS in the last 04452 hours.  Recent Labs   Lab Test 09/19/19  0940 09/12/19  0938 09/04/19  0945 08/28/19  0932 08/23/19  0954   WBC 4.0 4.9 5.7 4.0 3.8*   HGB 8.0* 8.3* 8.5* 8.7* 8.1*   PLT 54* 54* 90* 63* 69*   * 108* 106* 105* 113*   NEUTROPHIL 73.0 54.0 75.0 61.7 75.0     Recent Labs   Lab Test 09/19/19  0940 08/23/19  0954 08/20/19  1050   BILITOTAL 1.7* 1.5* 2.5*   ALKPHOS 101  95 107   ALT 37 33 41   AST 95* 80* 143*   ALBUMIN 3.7 3.5 3.6   * 953* 1,375*     TSH   Date Value Ref Range Status   10/16/2018 2.81 0.40 - 4.00 mU/L Final   10/20/2017 1.90 0.40 - 4.00 mU/L Final   06/19/2017 1.82 0.40 - 4.00 mU/L Final          ASSESSMENT AND PLAN   1. 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 Common variable immunodeficiency disorder  4. Splenomegaly    Tricia comes in for a scheduled follow up visit. She continues to have significant anemia. I do not see any immediate response to rituximab which she has completed 4 weekly doses. I reviewed alternative options including steroids, other immunosuppressive regimen, splenectomy. I reviewed the rationale of splenectomy, risks of infections by encapsulated organisms, irreversible nature of the procedure and possibility of failure.    She previously had responded to prednisone and I will restart her on prednisone at 60 mg daily and will try a slow taper. I will taper her to 40 mg daily in 2 weeks. I will see her in 4 weeks prior to any further planned taper.     I will have to start her on bactrim given the persistent prednisone use to prevent infection with pneumocystis.       Over 25 min of direct face to face time spent with patient with more than 50% time spent in counseling and coordinating care.      Jose Summers  Adj ,  Division of Hematology, Oncology & Transplantation  [facility].        Again, thank you for allowing me to participate in the care of your patient.        Sincerely,        Jose Summers MD

## 2019-09-20 NOTE — NURSING NOTE
Oncology Rooming Note    September 20, 2019 9:25 AM   Tricia Sosa is a 78 year old female who presents for:    Chief Complaint   Patient presents with     Oncology Clinic Visit     CVID (common variable immunodeficiency)     Initial Vitals: /46   Pulse 77   Temp 97.4  F (36.3  C) (Tympanic)   Resp 16   Ht 1.829 m (6')   Wt 66.7 kg (147 lb)   LMP  (LMP Unknown)   SpO2 98%   BMI 19.94 kg/m   Estimated body mass index is 19.94 kg/m  as calculated from the following:    Height as of this encounter: 1.829 m (6').    Weight as of this encounter: 66.7 kg (147 lb). Body surface area is 1.84 meters squared.  No Pain (0) Comment: Data Unavailable   No LMP recorded (lmp unknown). Patient is postmenopausal.  Allergies reviewed: Yes  Medications reviewed: Yes    Medications: Medication refills not needed today.  Pharmacy name entered into [employer]:    [id number] - [place], [place] - [address] [address] [facility]    Clinical concerns: follow up       Angy Villagran, MOHSEN

## 2019-09-20 NOTE — PATIENT INSTRUCTIONS
Dr. Summers is out of the office on 10/18/19, per Dr. Summers, he will see her on 10/28/19 and have infusion that day also. Fadumo TALLEY

## 2019-09-20 NOTE — PROGRESS NOTES
[facility]  FOLLOW-UP VISIT NOTE    PATIENT NAME: Tricia Sosa MRN # [id number]  DATE OF VISIT: Sep 20, 2019 YOB: 1940    REFERRING PROVIDER: No referring provider defined for this encounter.    DIAGNOSIS: Hemolytic anemia-autoimmune; IgA deficiency    TREATMENT SUMMARY:  Tricia has been diagnosed with IgA deficiency since her around 2000.  She was very sick at the time and had severe diarrhea.  She lost about 40 pounds in weight.  The workup revealed that she had markedly diminished CD4 counts of 48 and was diagnosed with CMV colitis.  Since then she has been followed in hematology clinic at [place] until recently.  She was noted to have anemia which was fairly long-standing.  She was initially referred for anemia in 2004 and also had a bone marrow biopsy done at that time which revealed hypercellular bone marrow with 70-80% cellularity and normal trilineage hematopoiesis and no morphologic features of MDS or lymphoproliferation.  Her anemia was attributed to her chronic underlying disease.  At the next evaluation in 2002 on 4 aggressive anemia there was no evidence of hemolysis, iron deficiency, B12 or folate deficiency.  Bone marrow biopsy was not pursued.  In summer of 2015 she had progressive fatigue, dyspnea on exertion and worsening anemia with a hemoglobin now of 9.  Workup at this time did suggest a hemolytic anemia with elevated LDH at 709, undetectable haptoglobin and elevated unconjugated bilirubin at 3.3.  Monospecific MARIKA was positive for both IgG and complement.  Cold agglutinin screen was positive with very low titer 1:64.  She was started on prednisone 60 mg daily with supplementation of iron folate and B12 starting 7/31/15.  Her prednisone has been slowly tapered and was discontinued off in January 2016.  She was last followed at [facility] on March 10 when she had stable hemoglobin.    Chemotherapy 8/28/2019 9/4/2019 9/12/2019 9/19/2019   Day,  Cycle Day 1, Cycle 1 Day 8, Cycle 1 Day 15, Cycle 1 Day 22, Cycle 1   riTUXimab (RITUXAN)  375 mg/m2 375 mg/m2 375 mg/m2 375 mg/m2     SUBJECTIVE   Tricia comes alone for this clinic visit for her hemolytic anemia.    Tricia comes in for a scheduled follow up. She has marked fatigue. She has complete 4 weekly doses of rituximab on 9/19/19.    Wt Readings from Last 10 Encounters:   09/20/19 66.7 kg (147 lb)   09/12/19 66.5 kg (146 lb 9.6 oz)   09/11/19 65.8 kg (145 lb)   09/04/19 65.3 kg (144 lb)   08/28/19 66 kg (145 lb 9.6 oz)   08/23/19 65.5 kg (144 lb 6.4 oz)   08/20/19 66.1 kg (145 lb 12.8 oz)   08/08/19 65.5 kg (144 lb 4.8 oz)   08/05/19 65.8 kg (145 lb)   07/23/19 66 kg (145 lb 9.6 oz)       PAST MEDICAL HISTORY   1. Common variable immunodeficiency syndrome  2. 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 Selective IgA deficiency  4. Leukopenia with markedly low CD4 counts on Bactrim prophylaxis  5. History of fall with subdural hematoma in 6/11/14 with complete resolution  6. Hashimoto's disease status post partial thyroidectomy      CURRENT OUTPATIENT MEDICATIONS     Current Outpatient Medications   Medication Sig     cyanocobalamin (VITAMIN  B-12) 1000 MCG tablet      fluocinolone (SYNALAR) 0.01 % external cream Apply topically 2 times daily     folic acid (FOLVITE) 1 MG tablet      ketoconazole (NIZORAL) 2 % external shampoo Use every other day to scalp as needed     levothyroxine (SYNTHROID/LEVOTHROID) 150 MCG tablet Take 1 tablet (150 mcg) by mouth daily     sulfamethoxazole-trimethoprim (BACTRIM DS/SEPTRA DS) 800-160 MG per tablet Take 1 pill orally on Mon, Wed and Friday     UNABLE TO FIND privigen inj monthly     predniSONE (DELTASONE) 20 MG tablet Take 3 tablets (60 mg) by mouth daily (Patient not taking: Reported on 9/11/2019)     No current facility-administered medications for this visit.         ALLERGIES     Allergies   Allergen Reactions     Doxycycline          REVIEW OF SYSTEMS   As above in the HPI, o/w complete 12-point ROS was negative.     PHYSICAL EXAM   /46   Pulse 77   Temp 97.4  F (36.3  C) (Tympanic)   Resp 16   Ht 1.829 m (6')   Wt 66.7 kg (147 lb)   LMP  (LMP Unknown)   SpO2 98%   BMI 19.94 kg/m      SpO2 Readings from Last 4 Encounters:   11/13/18 99%   10/16/18 99%   09/27/18 98%   09/19/18 100%     Wt Readings from Last 3 Encounters:   09/20/19 66.7 kg (147 lb)   09/12/19 66.5 kg (146 lb 9.6 oz)   09/11/19 65.8 kg (145 lb)     GEN: NAD  HEENT: PERRL, EOMI, no icterus, injection or pallor. Oropharynx is clear.  NECK: no cervical or supraclavicular lymphadenopathy  LUNGS: clear bilaterally  CV: regular, no murmurs, rubs, or gallops  ABDOMEN: soft, non-tender, non-distended, normal bowel sounds, no hepatosplenomegaly by percussion or palpation  EXT: warm, well perfused, no edema  NEURO: alert  SKIN: no rashes     LABORATORY AND IMAGING STUDIES     Recent Labs   Lab Test 09/19/19  0940 08/23/19  0954 08/20/19  1050 07/16/19  1000 04/23/19  0944    140 136 138 141   POTASSIUM 4.0 4.2 4.4 3.9 3.9   CHLORIDE 107 109 106 106 107   CO2 27 28 26 29 27   ANIONGAP 7 3 4 3 7   BUN 13 14 16 14 14   CR 0.77 0.68 0.72 0.65 0.69   GLC 86 89 75 91 86   CULLEN 8.0* 8.1* 8.2* 8.1* 8.7     No results for input(s): MAG, PHOS in the last 10691 hours.  Recent Labs   Lab Test 09/19/19  0940 09/12/19  0938 09/04/19  0945 08/28/19  0932 08/23/19  0954   WBC 4.0 4.9 5.7 4.0 3.8*   HGB 8.0* 8.3* 8.5* 8.7* 8.1*   PLT 54* 54* 90* 63* 69*   * 108* 106* 105* 113*   NEUTROPHIL 73.0 54.0 75.0 61.7 75.0     Recent Labs   Lab Test 09/19/19  0940 08/23/19  0954 08/20/19  1050   BILITOTAL 1.7* 1.5* 2.5*   ALKPHOS 101 95 107   ALT 37 33 41   AST 95* 80* 143*   ALBUMIN 3.7 3.5 3.6   * 953* 1,375*     TSH   Date Value Ref Range Status   10/16/2018 2.81 0.40 - 4.00 mU/L Final   10/20/2017 1.90 0.40 - 4.00 mU/L Final   06/19/2017 1.82 0.40 - 4.00 mU/L Final           ASSESSMENT AND PLAN   1. 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 Common variable immunodeficiency disorder  4. Splenomegaly    Tricia comes in for a scheduled follow up visit. She continues to have significant anemia. I do not see any immediate response to rituximab which she has completed 4 weekly doses. I reviewed alternative options including steroids, other immunosuppressive regimen, splenectomy. I reviewed the rationale of splenectomy, risks of infections by encapsulated organisms, irreversible nature of the procedure and possibility of failure.    She previously had responded to prednisone and I will restart her on prednisone at 60 mg daily and will try a slow taper. I will taper her to 40 mg daily in 2 weeks. I will see her in 4 weeks prior to any further planned taper.     I will have to start her on bactrim given the persistent prednisone use to prevent infection with pneumocystis.       Over 25 min of direct face to face time spent with patient with more than 50% time spent in counseling and coordinating care.      Jose Spain ,  Division of Hematology, Oncology & Transplantation  [facility].

## 2019-09-20 NOTE — PROGRESS NOTES
Infusion Nursing Note:  Tricia Sosa presents today for IVIG.    Patient seen by provider today: Yes:    present during visit today: Not Applicable.    Note: IVIG increased by 1 ml/kg/hr every 15 minutes with max of 4 ml/kg/hr.    Intravenous Access:  Peripheral IV placed.    Treatment Conditions:  Biological Infusion Checklist:  ~~~ NOTE: If the patient answers yes to any of the questions below, hold the infusion and contact ordering provider or on-call provider.    1. Have you recently had an elevated temperature, fever, chills, productive cough, coughing for 3 weeks or longer or hemoptysis, abnormal vital signs, night sweats,  chest pain or have you noticed a decrease in your appetite, unexplained weight loss or fatigue? No  2. Do you have any open wounds or new incisions? No  3. Do you have any recent or upcoming hospitalizations, surgeries or dental procedures? No  4. Do you currently have or recently have had any signs of illness or infection or are you on any antibiotics? No  5. Have you had any new, sudden or worsening abdominal pain? No  6. Have you or anyone in your household received a live vaccination in the past 4 weeks? Please note:  No live vaccines while on biologic/chemotherapy until 6 months after the last treatment.  Patient can receive the flu vaccine (shot only) and the pneumovax.  It is optimal for the patient to get these vaccines mid cycle, but they can be given at any time as long as it is not on the day of the infusion. No  7. Have you recently been diagnosed with any new nervous system diseases (ie. Multiple sclerosis, Guillain [place], seizures, neurological changes) or cancer diagnosis? No  8. Are you on any form of radiation or chemotherapy? No  9. Are you pregnant or breast feeding or do you have plans of pregnancy in the future? No  10. Have you been having any signs of worsening depression or suicidal ideations?  (benlysta only) No  11. Have there been any other new onset  medical symptoms? No        Post Infusion Assessment:  Patient tolerated infusion without incident.  Blood return noted pre and post infusion.  Site patent and intact, free from redness, edema or discomfort.  No evidence of extravasations.  Access discontinued per protocol.       Discharge Plan:   Patient declined prescription refills.  Discharge instructions reviewed with: Patient.  Patient and/or family verbalized understanding of discharge instructions and all questions answered.  Copy of AVS reviewed with patient and/or family.  Patient will return 10/28/19 for next appointment.  Patient discharged in stable condition accompanied by: self.  Departure Mode: Ambulatory.    Jocelyn Riggs, RN, RN

## 2019-09-24 RX ORDER — SULFAMETHOXAZOLE/TRIMETHOPRIM 800-160 MG
TABLET ORAL
Qty: 45 TABLET | Refills: 3 | Status: SHIPPED | OUTPATIENT
Start: 2019-09-24 | End: 2020-11-19

## 2019-10-02 ENCOUNTER — OFFICE VISIT (OUTPATIENT)
Dept: SLEEP MEDICINE | Facility: CLINIC | Age: 79
End: 2019-10-02
Attending: INTERNAL MEDICINE
Payer: MEDICARE

## 2019-10-02 VITALS
HEART RATE: 66 BPM | BODY MASS INDEX: 19.56 KG/M2 | SYSTOLIC BLOOD PRESSURE: 113 MMHG | HEIGHT: 72 IN | RESPIRATION RATE: 18 BRPM | OXYGEN SATURATION: 97 % | WEIGHT: 144.4 LBS | DIASTOLIC BLOOD PRESSURE: 67 MMHG

## 2019-10-02 DIAGNOSIS — G47.10 HYPERSOMNOLENCE: ICD-10-CM

## 2019-10-02 DIAGNOSIS — R53.83 FATIGUE, UNSPECIFIED TYPE: ICD-10-CM

## 2019-10-02 DIAGNOSIS — G47.9 SLEEP DISTURBANCE: Primary | ICD-10-CM

## 2019-10-02 PROCEDURE — 99205 OFFICE O/P NEW HI 60 MIN: CPT | Performed by: INTERNAL MEDICINE

## 2019-10-02 ASSESSMENT — MIFFLIN-ST. JEOR: SCORE: 1246.99

## 2019-10-02 NOTE — PROGRESS NOTES
Sleep Consultation Note:    Date on this visit: 10/2/2019    Tricia Sosa is sent by Emily Church for a sleep consultation regarding fatigue, excessive sleepiness , for evaluation for sleep-related breathing disorder as a contributing factor  for her symptoms    Primary Physician: Emily Church     Chief complaint: I never wake up feeling rested.  I am tired throughout the day.      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      Tricia does report snoring. She reports dyspnea on exertion. She denies snort arousals, choking/gasping for air, witnessed apneas, dry mouth or morning headaches.    Her sleep schedule changed compared to when she filled out the sleep history questionnaire for this appointment.  For years she slept for 5-1/2 to 6 hours sometimes less.  She was previously experiencing insomnia but that has changed ever since she was started on treatment with prednisone.  She reports 1-3 nocturnal awakenings, usually  to go to the bathroom , but  she is able to resume sleep within 5 minutes after the awakening. Lately she has been waking up between 3-4 AM and has been wide-awake after that sometimes she just lays down in the bed or she is up for the rest of the day.  For the past 4-5 months, she had no energy to get anything done but ever since she  initiated treatment with prednisone her energy levels have improved.  Her [place] sleepiness score is 7 out of 24 . She generally never  naps during the day.  She denies drowsiness while driving. Several years ago in 1988 she had concerns about drowsy driving.    She does not use electronics or watch television in bed.    She denies symptoms suggestive of restless legs syndrome.    She denies nightmares, sleepwalking,  sleep eating,  dream enactment behavior cataplexy, sleep paralysis or hallucinations.    She underwent cardiology and pulmonary evaluations for dyspnea.    According to her pulmonologist,  the dyspnea was likely related to  anemia.  though she bronchiectasis, it was very very mild.       Echocardiogram from 08/2019 showed normal LV function with EF of 55%-60%, no wall motion abnormalities, normal right ventricle, normal IVC.  Trivial age-related valvular abnormalities including mild aortic sclerosis without stenosis and mild tricuspid regurgitation, normal estimated pulmonary artery systolic pressure.   According to her cardiologist, there was no obvious cardiac cause of dyspnea in Ms. Sosa.  She has an essentially normal echocardiogram for age.  Her resting heart rate is normal and accelerated normally during mild exertion and reported that It appears that her main cause for her chronic dyspnea is anemia.      Social History  Tricia is  . She lives with her son and daughter-in-law.  She drinks coffee with half decaf and  sometimes mostly decaffeinated about 2 mugs in the morning.  Never smoked cigarettes.  She was previously drinking alcohol 1 beer per night but lately she has been drinking nonalcoholic beer because of the medications. She  does not use illicit drugs      Allergies:    Allergies   Allergen Reactions     Doxycycline        Medications:    Current Outpatient Medications   Medication Sig Dispense Refill     cyanocobalamin (VITAMIN  B-12) 1000 MCG tablet   3     fluocinolone (SYNALAR) 0.01 % external cream Apply topically 2 times daily 15 g 3     folic acid (FOLVITE) 1 MG tablet   3     ketoconazole (NIZORAL) 2 %  external shampoo Use every other day to scalp as needed 120 mL 11     levothyroxine (SYNTHROID/LEVOTHROID) 150 MCG tablet Take 1 tablet (150 mcg) by mouth daily 90 tablet 0     predniSONE (DELTASONE) 20 MG tablet Take 3 tablets (60 mg) by mouth daily for 14 days, THEN 2 tablets (40 mg) daily for 14 days. 70 tablet 0     sulfamethoxazole-trimethoprim (BACTRIM DS/SEPTRA DS) 800-160 MG tablet Take 1 pill orally on Mon, Wed and Friday 45 tablet 3     UNABLE TO FIND privigen inj monthly         Problem List:  Patient Active Problem List    Diagnosis Date Noted     Weakness of both lower extremities 08/15/2019     Priority: Medium     Right-sided low back pain with right-sided sciatica, unspecified chronicity 11/16/2017     Priority: Medium     Other autoimmune hemolytic anemias (H) 09/21/2017     Priority: Medium     B12 deficiency 08/03/2015     Priority: Medium     Started on B12 injections 8/3/15.       CARDIOVASCULAR SCREENING; LDL GOAL LESS THAN 130 08/03/2015     Priority: Medium     CVID (common variable immunodeficiency) (H) 04/10/2015     Priority: Medium     Was seen at [facility]-- Dr. Estevez.         Lymphocytic colitis 09/27/2001     Priority: Medium     Problem list name updated by automated process. 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, and posterior vitreous detachment     Urinary tract infection, site not specified     Recurrent UTI's     Past Surgical History:   Procedure Laterality Date     C LIGATE FALLOPIAN TUBE       COLONOSCOPY N/A  4/26/2016    Procedure: COLONOSCOPY;  Surgeon: Dontae Del Rio MD;  Location:  GI     HC COLONOSCOPY W BIOPSY  4/26/00     HC COLONOSCOPY W BIOPSY  10/8/03     HC COLONOSCOPY W BIOPSY  6/27/05    REPEAT IN 5 YEARS     HC CYSTOSCOPY,INSERT URETERAL STENT      Ureteral stent insertion     HC FLEX SIGMOIDOSCOPY W BIOPSY  5/30/00      LAPAROSCOPY, SURGICAL; CHOLECYSTECTOMY  1992      THYROIDECTOMY       LIGATION OF HEMORRHOID(S)      Hemorrhoidectomy     UPPER GI ENDOSCOPY,BIOPSY  10/01/01       Social History:  Social History     Socioeconomic History     Marital status:      Spouse name: Not on file     Number of children: Not on file     Years of education: Not on file     Highest education level: Not on file   Occupational History     Not on file   Social Needs     Financial resource strain: Not on file     Food insecurity:     Worry: Not on file     Inability: Not on file     Transportation needs:     Medical: Not on file     Non-medical: Not on file   Tobacco Use     Smoking status: Never Smoker     Smokeless tobacco: Never Used   Substance and Sexual Activity     Alcohol use: No     Alcohol/week: 0.0 standard drinks     Frequency: Never     Drug use: No     Sexual activity: Never   Lifestyle     Physical activity:     Days per week: Not on file     Minutes per session: Not on file     Stress: Not on file   Relationships     Social connections:     Talks on phone: Not on file     Gets together: Not on file     Attends [employer] service: Not on file     Active member of club or organization: Not on file     Attends meetings of clubs or organizations: Not on file     Relationship status: Not on file     Intimate partner violence:     Fear of current or ex partner: Not on file     Emotionally abused: Not on file     Physically abused: Not on file     Forced sexual activity: Not on file   Other Topics Concern     Parent/sibling w/ CABG, MI or angioplasty before 65F 55M? Not Asked   Social History  Narrative     Not on file       Family History:  Family History   Problem Relation Age of Onset     Colon Cancer Mother 90     Cerebrovascular Disease Father          at age 85     Dementia Brother      Dementia Brother      Pancreatic Cancer Brother      Breast Cancer Sister        Review of Systems:  A complete review of systems reviewed by me is negative with the exeption of what has been mentioned in the history of present illness And symptoms listed below:  Weight  Loss,  though it has been pretty stable for the past 3 months around 143 pounds.    Postnasal drainage and runny nose.    Weakness in arms and legs.  Shortness of breath with activity (16.75 inches moderate exertion)    Physical Examination:  Vitals: /67   Pulse 66   Resp 18   Ht 1.829 m (6')   Wt 65.5 kg (144 lb 6.4 oz)   LMP  (LMP Unknown)   SpO2 97%   BMI 19.58 kg/m    BMI= Body mass index is 19.58 kg/m .    Neck Cir (cm): 36 cm    [place] Total Score 10/2/2019   Total score - [place] 7       General: No apparent distress, appropriately groomed  Head: Normocephalic, atraumatic  Eyes: no icterus, PERRL  Nose: Nares patent. No exudate/erythema. No septal deviation noted.  Mouth: op pink and moist; overbite with wearing off of the enamel of the upper front teeth  Orophraynx: Opening is narrowed, uvula: Elongated; low draping soft palate   Mallampati Class: III   No tonsillar hypertrophy  Neck: Supple, Circumference: 14 inches  Cardiac: Regular rate and rhythm  Chest: Symmetric air movement, lungs clear to auscultation bilaterally  ] No full neurological deficit musculoskeletal: No edema noted  Skin: Warm, dry, intact  Psych: Mood pleasant, affect congruent  Neuro:  Mental status: Awake, alert, attentive, oriented.  No focal neurological deficit.      Impression/Plan:  H/o Snoring, nonrestorative sleep with fatigue.  Possible obstructive sleep apnea.  Diagnosis is suggested based on the history, age, postmenopausal status and  "oropharyngeal anatomy.    Recommend in-lab sleep study to evaluate for sleep-related breathing disorder.  Diagnosis and treatment for MARIE have been discussed. Complications of untreated MARIE have also been discussed.     Strongly encouraged to follow good sleep hygiene/behavioral techniques.      Patient is a life long non-smoker and has been encouraged to continue to not smoke.    Encouraged   healthy diet, and exercise.    Patient was strongly advised to avoid driving, operating any heavy machinery or other hazardous situations while drowsy or sleepy.  Patient was counseled on the importance of driving while alert, to pull over if drowsy, or nap before getting into the vehicle if sleepy.        She will follow up with me in approximately two weeks after her sleep study has been competed to review the results and discuss plan of care.        CC: Emily Church      The above note was dictated using voice recognition software. Although reviewed after completion, some word and grammatical error may remain . Please contact the author for any clarifications.    \"I spent a total of 60 minutes face to face with Tricia Sosa during today's office visit. Over 50% of this time was spent counseling the patient and  coordinating care regarding sleep-related breathing disorder, polysomnography evaluation.\"       Yadi Muniz MD   of Medicine,  Division of Pulmonary/Sleep Medicine  [facility].      "

## 2019-10-02 NOTE — PATIENT INSTRUCTIONS
Here are the ranges based off your height and current weight.    Body mass index is 19.58 kg/m .        Underweight = <18.5  Normal weight = 18.5-24.9   Overweight = 25-29.9   Obesity = BMI of 30 or greater       Your BMI is Body mass index is 19.58 kg/m .  Weight management is a personal decision.  If you are interested in exploring weight loss strategies, the following discussion covers the approaches that may be successful. Body mass index (BMI) is one way to tell whether you are at a healthy weight, overweight, or obese. It measures your weight in relation to your height.  A BMI of 18.5 to 24.9 is in the healthy range. A person with a BMI of 25 to 29.9 is considered overweight, and someone with a BMI of 30 or greater is considered obese. More than two-thirds of American adults are considered overweight or obese.  Being overweight or obese increases the risk for further weight gain. Excess weight may lead to heart disease and diabetes.  Creating and following plans for healthy eating and physical activity may help you improve your health.  Weight control is part of healthy lifestyle and includes exercise, emotional health, and healthy eating habits. Careful eating habits lifelong are the mainstay of weight control. Though there are significant health benefits from weight loss, long-term weight loss with diet alone may be very difficult to achieve- studies show long-term success with dietary management in less than 10% of people. Attaining a healthy weight may be especially difficult to achieve in those with severe obesity. In some cases, medications, devices and surgical management might be considered.  What can you do?  If you are overweight or obese and are interested in methods for weight loss, you should discuss this with your provider.     Consider reducing daily calorie intake by 500 calories.     Keep a food journal.     Avoiding skipping meals, consider cutting portions instead.    Diet combined with  "exercise helps maintain muscle while optimizing fat loss. Strength training is particularly important for building and maintaining muscle mass. Exercise helps reduce stress, increase energy, and improves fitness. Increasing exercise without diet control, however, may not burn enough calories to loose weight.       Start walking three days a week 10-20 minutes at a time    Work towards walking thirty minutes five days a week     Eventually, increase the speed of your walking for 1-2 minutes at time    In addition, we recommend that you review healthy lifestyles and methods for weight loss available through the National Institutes of Health patient information sites:  http://win.niddk.nih.gov/publications/index.htm    And look into health and wellness programs that may be available through your health insurance provider, employer, local community center, or jacques club.    MY TREATMENT INFORMATION FOR SLEEP APNEA-  Tricia Sosa    DOCTOR : Yadi Muniz MD  SLEEP CENTER :      MY CONTACT NUMBER:     Am I having a sleep study at a sleep center?  Make sure you have an appointment for the study before you leave!    Am I having a home sleep study?  Watch this video:  https://www.[employer]vant[employer].com/watch?v=CteI_GhyP9g&[email or web address]  Please verify your insurance coverage with your insurance carrier    Frequently asked questions:  1. What is Obstructive Sleep Apnea (MARIE)? MARIE is the most common type of sleep apnea. Apnea means, \"without breath.\"  Apnea is most often caused by narrowing or collapse of the upper airway as muscles relax during sleep.   Almost everyone has occasional apneas. Most people with sleep apnea have had brief interruptions at night frequently for many years.  The severity of sleep apnea is related to how frequent and severe the events are.   2. What are the consequences of MARIE? Symptoms include: feeling sleepy during the day, snoring loudly, gasping or " stopping of breathing, trouble sleeping, and occasionally morning headaches or heartburn at night.  Sleepiness can be serious and even increase the risk of falling asleep while driving. Other health consequences may include development of high blood pressure and other cardiovascular disease in persons who are susceptible. Untreated MARIE  can contribute to heart disease, stroke and diabetes.   3. What are the treatment options? In most situations, sleep apnea is a lifelong disease that must be managed with daily therapy. Medications are not effective for sleep apnea and surgery is generally not considered until other therapies have been tried. Your treatment is your choice . Continuous Positive Airway (CPAP) works right away and is the therapy that is effective in nearly everyone. An oral device to hold your jaw forward is usually the next most reliable option. Other options include postioning devices (to keep you off your back), weight loss, and surgery including a tongue pacing device. There is more detail about some of these options below.    Important tips for using CPAP and similar devices   Know your equipment:  CPAP is continuous positive airway pressure that prevents obstructive sleep apnea by keeping the throat from collapsing while you are sleeping. In most cases, the device is  smart  and can slowly self-adjusts if your throat collapses and keeps a record every day of how well you are treated-this information is available to you and your care team.  BPAP is bilevel positive airway pressure that keeps your throat open and also assists each breath with a pressure boost to maintain adequate breathing.  Special kinds of BPAP are used in patients who have inadequate breathing from lung or heart disease. In most cases, the device is  smart  and can slowly self-adjusts to assist breathing. Like CPAP, the device keeps a record of how well you are treated.  Your mask is your connection to the device. You get to  choose what feels most comfortable and the staff will help to make sure if fits. Here: are some examples of the different masks that are available:       Key points to remember on your journey with sleep apnea:  1. Sleep study.  PAP devices often need to be adjusted during a sleep study to show that they are effective and adjusted right.  2. Good tips to remember: Try wearing just the mask during a quiet time during the day so your body adapts to wearing it. A humidifier is recommended for comfort in most cases to prevent drying of your nose and throat. Allergy medication from your provider may help you if you are having nasal congestion.  3. Getting settled-in. It takes more than one night for most of us to get used to wearing a mask. Try wearing just the mask during a quiet time during the day so your body adapts to wearing it. A humidifier is recommended for comfort in most cases. Our team will work with you carefully on the first day and will be in contact within 4 days and again at 2 and 4 weeks for advice and remote device adjustments. Your therapy is evaluated by the device each day.   4. Use it every night. The more you are able to sleep naturally for 7-8 hours, the more likely you will have good sleep and to prevent health risks or symptoms from sleep apnea. Even if you use it 4 hours it helps. Occasionally all of us are unable to use a medical therapy, in sleep apnea, it is not dangerous to miss one night.   5. Communicate. Call our skilled team on the number provided on the first day if your visit for problems that make it difficult to wear the device. Over 2 out of 3 patients can learn to wear the device long-term with help from our team. Remember to call our team or your sleep providers if you are unable to wear the device as we may have other solutions for those who cannot adapt to mask CPAP therapy. It is recommended that you sleep your sleep provider within the first 3 months and yearly after that if  you are not having problems.   6. Use it for your health. We encourage use of CPAP masks during daytime quiet periods to allow your face and brain to adapt to the sensation of CPAP so that it will be a more natural sensation to awaken to at night or during naps. This can be very useful during the first few weeks or months of adapting to CPAP though it does not help medically to wear CPAP during wakefulness and  should not be used as a strategy just to meet guidelines.  7. Take care of your equipment. Make sure you clean your mask and tubing using directions every day and that your filter and mask are replaced as recommended or if they are not working.     BESIDES CPAP, WHAT OTHER THERAPIES ARE THERE?    Positioning Device  Positioning devices are generally used when sleep apnea is mild and only occurs on your back.This example shows a pillow that straps around the waist. It may be appropriate for those whose sleep study shows milder sleep apnea that occurs primarily when lying flat on one's back. Preliminary studies have shown benefit but effectiveness at home may need to be verified by a home sleep test. These devices are generally not covered by medical insurance.  Examples of devices that maintain sleeping on the back to prevent snoring and mild sleep apnea.    Belt type body positioner  Http://[employer]/    Electronic reminder  Http://nightshifttherapy.com/  Http://www.[employer].com.au/      Oral Appliance  What is oral appliance therapy?  An oral appliance device fits on your teeth at night like a retainer used after having braces. The device is made by a specialized dentist and requires several visits over 1-2 months before a manufactured device is made to fit your teeth and is adjusted to prevent your sleep apnea. Once an oral device is working properly, snoring should be improved. A home sleep test may be recommended at that time if to determine whether the sleep apnea is adequately treated.       Some  things to remember:  -Oral devices are often, but not always, covered by your medical insurance. Be sure to check with your insurance provider.   -If you are referred for oral therapy, you will be given a list of specialized dentists to consider or you may choose to visit the Web site of the American Academy of Dental Sleep Medicine  -Oral devices are less likely to work if you have severe sleep apnea or are extremely overweight.     More detailed information  An oral appliance is a small acrylic device that fits over the upper and lower teeth  (similar to a retainer or a mouth guard). This device slightly moves jaw forward, which moves the base of the tongue forward, opens the airway, improves breathing for effective treat snoring and obstructive sleep apnea in perhaps 7 out of 10 people .  The best working devices are custom-made by a dental device  after a mold is made of the teeth 1, 2, 3.  When is an oral appliance indicated?  Oral appliance therapy is recommended as a first-line treatment for patients with primary snoring, mild sleep apnea, and for patients with moderate sleep apnea who prefer appliance therapy to use of CPAP4, 5. Severity of sleep apnea is determined by sleep testing and is based on the number of respiratory events per hour of sleep.   How successful is oral appliance therapy?  The success rate of oral appliance therapy in patients with mild sleep apnea is 75-80% while in patients with moderate sleep apnea it is 50-70%. The chance of success in patients with severe sleep apnea is 40-50%. The research also shows that oral appliances have a beneficial effect on the cardiovascular health of MARIE patients at the same magnitude as CPAP therapy7.  Oral appliances should be a second-line treatment in cases of severe sleep apnea, but if not completely successful then a combination therapy utilizing CPAP plus oral appliance therapy may be effective. Oral appliances tend to be effective in a  broad range of patients although studies show that the patients who have the highest success are females, younger patients, those with milder disease, and less severe obesity. 3, 6.   Finding a dentist that practices dental sleep medicine  Specific training is available through the American Academy of Dental Sleep Medicine for dentists interested in working in the field of sleep. To find a dentist who is educated in the field of sleep and the use of oral appliances, near you, visit the Web site of the American Academy of Dental Sleep Medicine.    References  1. Qasim et al. Objectively measured vs self-reported compliance during oral appliance therapy for sleep-disordered breathing. Chest 2013; 144(5): 4652-3989.  2. Tommy, et al. Objective measurement of compliance during oral appliance therapy for sleep-disordered breathing. Thorax 2013; 68(1): 91-96.  3. Gonzalo et al. Mandibular advancement devices in 620 men and women with MARIE and snoring: tolerability and predictors of treatment success. Chest 2004; 125: 0307-8602.  4. Mamie, et al. Oral appliances for snoring and MARIE: a review. Sleep 2006; 29: 244-262.  5. Ry et al. Oral appliance treatment for MARIE: an update. J Clin Sleep Med 2014; 10(2): 215-227.  6. Roshan et al. Predictors of OSAH treatment outcome. J Dent Res 2007; 86: 0945-9878.      Weight Loss:    Weight loss is a long-term strategy that may improve sleep apnea in some patients.    Weight management is a personal decision and the decision should be based on your interest and the potential benefits.  If you are interested in exploring weight loss strategies, the following discussion covers the impact on weight loss on sleep apnea and the approaches that may be successful.    Being overweight does not necessarily mean you will have health consequences.  Those who have BMI over 35 or over 27 with existing medical conditions carries greater risk.   Weight loss decreases  severity of sleep apnea in most people with obesity. For those with mild obesity who have developed snoring with weight gain, even 15-30 pound weight loss can improve and occasionally eliminate sleep apnea.  Structured and life-long dietary and health habits are necessary to lose weight and keep healthier weight levels.     Though there may be significant health benefits from weight loss, long-term weight loss is very difficult to achieve- studies show success with dietary management in less than 10% of people. In addition, substantial weight loss may require years of dietary control and may be difficult if patients have severe obesity. In these cases, surgical management may be considered.  Finally, older individuals who have tolerated obesity without health complications may be less likely to benefit from weight loss strategies.        Your BMI is Body mass index is 19.58 kg/m .  Weight management is a personal decision.  If you are interested in exploring weight loss strategies, the following discussion covers the approaches that may be successful. Body mass index (BMI) is one way to tell whether you are at a healthy weight, overweight, or obese. It measures your weight in relation to your height.  A BMI of 18.5 to 24.9 is in the healthy range. A person with a BMI of 25 to 29.9 is considered overweight, and someone with a BMI of 30 or greater is considered obese. More than two-thirds of American adults are considered overweight or obese.  Being overweight or obese increases the risk for further weight gain. Excess weight may lead to heart disease and diabetes.  Creating and following plans for healthy eating and physical activity may help you improve your health.  Weight control is part of healthy lifestyle and includes exercise, emotional health, and healthy eating habits. Careful eating habits lifelong are the mainstay of weight control. Though there are significant health benefits from weight loss, long-term  weight loss with diet alone may be very difficult to achieve- studies show long-term success with dietary management in less than 10% of people. Attaining a healthy weight may be especially difficult to achieve in those with severe obesity. In some cases, medications, devices and surgical management might be considered.  What can you do?  If you are overweight or obese and are interested in methods for weight loss, you should discuss this with your provider.     Consider reducing daily calorie intake by 500 calories.     Keep a food journal.     Avoiding skipping meals, consider cutting portions instead.    Diet combined with exercise helps maintain muscle while optimizing fat loss. Strength training is particularly important for building and maintaining muscle mass. Exercise helps reduce stress, increase energy, and improves fitness. Increasing exercise without diet control, however, may not burn enough calories to loose weight.       Start walking three days a week 10-20 minutes at a time    Work towards walking thirty minutes five days a week     Eventually, increase the speed of your walking for 1-2 minutes at time    In addition, we recommend that you review healthy lifestyles and methods for weight loss available through the National Institutes of Health patient information sites:  http://win.niddk.nih.gov/publications/index.htm    And look into health and wellness programs that may be available through your health insurance provider, employer, local community center, or jacques club.          Surgery:    Surgery for obstructive sleep apnea is considered generally only when other therapies fail to work. Surgery may be discussed with you if you are having a difficult time tolerating CPAP and or when there is an abnormal structure that requires surgical correction.  Nose and throat surgeries often enlarge the airway to prevent collapse.  Most of these surgeries create pain for 1-2 weeks and up to half of the most  common surgeries are not effective throughout life.  You should carefully discuss the benefits and drawbacks to surgery with your sleep provider and surgeon to determine if it is the best solution for you.   More information  Surgery for MARIE is directed at areas that are responsible for narrowing or complete obstruction of the airway during sleep.  There are a wide range of procedures available to enlarge and/or stabilize the airway to prevent blockage of breathing in the three major areas where it can occur: the palate, tongue, and nasal regions.  Successful surgical treatment depends on the accurate identification of the factors responsible for obstructive sleep apnea in each person.  A personalized approach is required because there is no single treatment that works well for everyone.  Because of anatomic variation, consultation with an examination by a sleep surgeon is a critical first step in determining what surgical options are best for each patient.  In some cases, examination during sedation may be recommended in order to guide the selection of procedures.  Patients will be counseled about risks and benefits as well as the typical recovery course after surgery. Surgery is typically not a cure for a person s MARIE.  However, surgery will often significantly improve one s MARIE severity (termed  success rate ).  Even in the absence of a cure, surgery will decrease the cardiovascular risk associated with OSA7; improve overall quality of life8 (sleepiness, functionality, sleep quality, etc).      Palate Procedures:  Patients with MARIE often have narrowing of their airway in the region of their tonsils and uvula.  The goals of palate procedures are to widen the airway in this region as well as to help the tissues resist collapse.  Modern palate procedure techniques focus on tissue conservation and soft tissue rearrangement, rather than tissue removal.  Often the uvula is preserved in this procedure. Residual sleep  apnea is common in patient after pharyngoplasty with an average reduction in sleep apnea events of 33%2.      Tongue Procedures:  ExamWhile patients are awake, the muscles that surround the throat are active and keep this region open for breathing. These muscles relax during sleep, allowing the tongue and other structures to collapse and block breathing.  There are several different tongue procedures available.  Selection of a tongue base procedure depends on characteristics seen on physical exam.  Generally, procedures are aimed at removing bulky tissues in this area or preventing the back of the tongue from falling back during sleep.  Success rates for tongue surgery range from 50-62%3.    Hypoglossal Nerve Stimulation:  Hypoglossal nerve stimulation has recently received approval from the [place] Food and Drug Administration for the treatment of obstructive sleep apnea.  This is based on research showing that the system was safe and effective in treating sleep apnea6.  Results showed that the median AHI score decreased 68%, from 29.3 to 9.0. This therapy uses an implant system that senses breathing patterns and delivers mild stimulation to airway muscles, which keeps the airway open during sleep.  The system consists of three fully implanted components: a small generator (similar in size to a pacemaker), a breathing sensor, and a stimulation lead.  Using a small handheld remote, a patient turns the therapy on before bed and off upon awakening.    Candidates for this device must be greater than 22 years of age, have moderate to severe MARIE (AHI between 20-65), BMI less than 32, have tried CPAP/oral appliance without success, and have appropriate upper airway anatomy (determined by a sleep endoscopy performed by Dr. Lovelace).    Hypoglossal Nerve Stimulation Pathway:    The sleep surgeon s office will work with the patient through the insurance prior-authorization process (including communications and appeals).     Nasal Procedures:  Nasal obstruction can interfere with nasal breathing during the day and night.  Studies have shown that relief of nasal obstruction can improve the ability of some patients to tolerate positive airway pressure therapy for obstructive sleep apnea1.  Treatment options include medications such as nasal saline, topical corticosteroid and antihistamine sprays, and oral medications such as antihistamines or decongestants. Non-surgical treatments can include external nasal dilators for selected patients. If these are not successful by themselves, surgery can improve the nasal airway either alone or in combination with these other options.      Combination Procedures:  Combination of surgical procedures and other treatments may be recommended, particularly if patients have more than one area of narrowing or persistent positional disease.  The success rate of combination surgery ranges from 66-80%2,3.    References  1. Selena PACHECO. The Role of the Nose in Snoring and Obstructive Sleep Apnoea: An Update.  Eur Arch Otorhinolaryngol. 2011; 268: 1365-73.  2.  Romy SM; Isael JA; Sammie JR; Pallanch JF; Abilio MB; Andrew SG; Maximiliano BEAN. Surgical modifications of the upper airway for obstructive sleep apnea in adults: a systematic review and meta-analysis. SLEEP 2010;33(10):1650-4203. Juan Jrinael ESPINO. Hypopharyngeal surgery in obstructive sleep apnea: an evidence-based medicine review.  Arch Otolaryngol Head Neck Surg. 2006 Feb;132(2):206-13.  3. Clarence CARPENTERH1, Karen Y, Aleksey FELICIA. The efficacy of anatomically based multilevel surgery for obstructive sleep apnea. Otolaryngol Head Neck Surg. 2003 Oct;129(4):327-35.  4. Maira ESPINO, Goldberg A. Hypopharyngeal Surgery in Obstructive Sleep Apnea: An Evidence-Based Medicine Review. Arch Otolaryngol Head Neck Surg. 2006 Feb;132(2):206-13.  5. Sandro LIM et al. Upper-Airway Stimulation for Obstructive Sleep Apnea.  N Engl J Med. 2014 Jan 9;370(2):139-49.  6. Lauren Warner al.  Increased Incidence of Cardiovascular Disease in Middle-aged Men with Obstructive Sleep Apnea. Am J Respir Crit Care Med; 2002 166: 159-165  7. Szuie JACOBSEN et al. Studying Life Effects and Effectiveness of Palatopharyngoplasty (SLEEP) study: Subjective Outcomes of Isolated Uvulopalatopharyngoplasty. Otolaryngol Head Neck Surg. 2011; 144: 623-631.  Please do not drive /operate heavy machinery, if drowsy or sleepy;  pull over if drowsy.

## 2019-10-10 ENCOUNTER — TELEPHONE (OUTPATIENT)
Dept: SLEEP MEDICINE | Facility: CLINIC | Age: 79
End: 2019-10-10

## 2019-10-10 NOTE — TELEPHONE ENCOUNTER
Reason for call:  Other   Patient called regarding (reason for call): appointment  Additional comments: patient would like call back to get sleep study scheduled. Patient has consultation on October 2nd    Phone number to reach patient:  Cell number on file:    No relevant phone numbers on file.       Best Time:  anytime    Can we leave a detailed message on this number?  YES

## 2019-10-11 NOTE — TELEPHONE ENCOUNTER
PSG has been scheduled.  Pt is unable to come in on days that Dr. Avina has clinic so she will be following up with Dr. Rogers to discuss results.    EVARISTO Villa

## 2019-10-14 ENCOUNTER — INFUSION THERAPY VISIT (OUTPATIENT)
Dept: INFUSION THERAPY | Facility: CLINIC | Age: 79
End: 2019-10-14
Attending: INTERNAL MEDICINE
Payer: MEDICARE

## 2019-10-14 ENCOUNTER — ONCOLOGY VISIT (OUTPATIENT)
Dept: ONCOLOGY | Facility: CLINIC | Age: 79
End: 2019-10-14
Attending: INTERNAL MEDICINE
Payer: MEDICARE

## 2019-10-14 ENCOUNTER — HOSPITAL ENCOUNTER (OUTPATIENT)
Facility: CLINIC | Age: 79
Setting detail: SPECIMEN
Discharge: HOME | End: 2019-10-14
Attending: INTERNAL MEDICINE | Admitting: INTERNAL MEDICINE
Payer: MEDICARE

## 2019-10-14 VITALS
BODY MASS INDEX: 19.5 KG/M2 | DIASTOLIC BLOOD PRESSURE: 56 MMHG | RESPIRATION RATE: 16 BRPM | OXYGEN SATURATION: 98 % | WEIGHT: 144 LBS | HEIGHT: 72 IN | TEMPERATURE: 98.2 F | SYSTOLIC BLOOD PRESSURE: 118 MMHG | HEART RATE: 80 BPM

## 2019-10-14 DIAGNOSIS — D59.19 OTHER AUTOIMMUNE HEMOLYTIC ANEMIAS: ICD-10-CM

## 2019-10-14 DIAGNOSIS — D83.9 CVID (COMMON VARIABLE IMMUNODEFICIENCY) (H): Primary | ICD-10-CM

## 2019-10-14 DIAGNOSIS — D83.9 CVID (COMMON VARIABLE IMMUNODEFICIENCY) (H): ICD-10-CM

## 2019-10-14 DIAGNOSIS — E53.8 B12 DEFICIENCY: ICD-10-CM

## 2019-10-14 LAB
ALBUMIN SERPL-MCNC: 3.9 G/DL (ref 3.4–5)
ALP SERPL-CCNC: 89 U/L (ref 40–150)
ALT SERPL W P-5'-P-CCNC: 34 U/L (ref 0–50)
ANION GAP SERPL CALCULATED.3IONS-SCNC: 3 MMOL/L (ref 3–14)
AST SERPL W P-5'-P-CCNC: 33 U/L (ref 0–45)
BASOPHILS # BLD AUTO: 0 10E9/L (ref 0–0.2)
BASOPHILS NFR BLD AUTO: 0.3 %
BILIRUB SERPL-MCNC: 0.7 MG/DL (ref 0.2–1.3)
BUN SERPL-MCNC: 18 MG/DL (ref 7–30)
CALCIUM SERPL-MCNC: 8.3 MG/DL (ref 8.5–10.1)
CHLORIDE SERPL-SCNC: 102 MMOL/L (ref 94–109)
CO2 SERPL-SCNC: 29 MMOL/L (ref 20–32)
CREAT SERPL-MCNC: 0.58 MG/DL (ref 0.52–1.04)
DIFFERENTIAL METHOD BLD: ABNORMAL
EOSINOPHIL # BLD AUTO: 0 10E9/L (ref 0–0.7)
EOSINOPHIL NFR BLD AUTO: 0.4 %
ERYTHROCYTE [DISTWIDTH] IN BLOOD BY AUTOMATED COUNT: 13.4 % (ref 10–15)
GFR SERPL CREATININE-BSD FRML MDRD: 87 ML/MIN/{1.73_M2}
GLUCOSE SERPL-MCNC: 174 MG/DL (ref 70–99)
HCT VFR BLD AUTO: 31.1 % (ref 35–47)
HGB BLD-MCNC: 9.9 G/DL (ref 11.7–15.7)
IMM GRANULOCYTES # BLD: 0 10E9/L (ref 0–0.4)
IMM GRANULOCYTES NFR BLD: 0.4 %
LDH SERPL L TO P-CCNC: 286 U/L (ref 81–234)
LYMPHOCYTES # BLD AUTO: 0.3 10E9/L (ref 0.8–5.3)
LYMPHOCYTES NFR BLD AUTO: 4.7 %
MCH RBC QN AUTO: 29.6 PG (ref 26.5–33)
MCHC RBC AUTO-ENTMCNC: 31.8 G/DL (ref 31.5–36.5)
MCV RBC AUTO: 93 FL (ref 78–100)
MONOCYTES # BLD AUTO: 0.5 10E9/L (ref 0–1.3)
MONOCYTES NFR BLD AUTO: 6.9 %
NEUTROPHILS # BLD AUTO: 6.3 10E9/L (ref 1.6–8.3)
NEUTROPHILS NFR BLD AUTO: 87.3 %
NRBC # BLD AUTO: 0 10*3/UL
NRBC BLD AUTO-RTO: 0 /100
PLATELET # BLD AUTO: 131 10E9/L (ref 150–450)
POTASSIUM SERPL-SCNC: 3.6 MMOL/L (ref 3.4–5.3)
PROT SERPL-MCNC: 6.3 G/DL (ref 6.8–8.8)
RBC # BLD AUTO: 3.34 10E12/L (ref 3.8–5.2)
RETICS # AUTO: 35.1 10E9/L (ref 25–95)
RETICS/RBC NFR AUTO: 1.1 % (ref 0.5–2)
SODIUM SERPL-SCNC: 134 MMOL/L (ref 133–144)
VIT B12 SERPL-MCNC: 547 PG/ML (ref 193–986)
WBC # BLD AUTO: 7.2 10E9/L (ref 4–11)

## 2019-10-14 PROCEDURE — 83615 LACTATE (LD) (LDH) ENZYME: CPT | Performed by: INTERNAL MEDICINE

## 2019-10-14 PROCEDURE — 82784 ASSAY IGA/IGD/IGG/IGM EACH: CPT | Performed by: INTERNAL MEDICINE

## 2019-10-14 PROCEDURE — 25000132 ZZH RX MED GY IP 250 OP 250 PS 637: Mod: GY | Performed by: INTERNAL MEDICINE

## 2019-10-14 PROCEDURE — 83010 ASSAY OF HAPTOGLOBIN QUANT: CPT | Performed by: INTERNAL MEDICINE

## 2019-10-14 PROCEDURE — 80053 COMPREHEN METABOLIC PANEL: CPT | Performed by: INTERNAL MEDICINE

## 2019-10-14 PROCEDURE — 99214 OFFICE O/P EST MOD 30 MIN: CPT | Performed by: INTERNAL MEDICINE

## 2019-10-14 PROCEDURE — G0463 HOSPITAL OUTPT CLINIC VISIT: HCPCS | Mod: 25

## 2019-10-14 PROCEDURE — 96375 TX/PRO/DX INJ NEW DRUG ADDON: CPT

## 2019-10-14 PROCEDURE — 96366 THER/PROPH/DIAG IV INF ADDON: CPT

## 2019-10-14 PROCEDURE — 82607 VITAMIN B-12: CPT | Performed by: INTERNAL MEDICINE

## 2019-10-14 PROCEDURE — 96365 THER/PROPH/DIAG IV INF INIT: CPT

## 2019-10-14 PROCEDURE — 85025 COMPLETE CBC W/AUTO DIFF WBC: CPT | Performed by: INTERNAL MEDICINE

## 2019-10-14 PROCEDURE — 85045 AUTOMATED RETICULOCYTE COUNT: CPT | Performed by: INTERNAL MEDICINE

## 2019-10-14 PROCEDURE — 25000128 H RX IP 250 OP 636: Performed by: INTERNAL MEDICINE

## 2019-10-14 RX ORDER — ACETAMINOPHEN 325 MG/1
650 TABLET ORAL ONCE
Status: COMPLETED | OUTPATIENT
Start: 2019-10-14 | End: 2019-10-14

## 2019-10-14 RX ORDER — HEPARIN SODIUM (PORCINE) LOCK FLUSH IV SOLN 100 UNIT/ML 100 UNIT/ML
5 SOLUTION INTRAVENOUS
Status: CANCELLED | OUTPATIENT
Start: 2019-10-14

## 2019-10-14 RX ORDER — HEPARIN SODIUM,PORCINE 10 UNIT/ML
5 VIAL (ML) INTRAVENOUS
Status: CANCELLED | OUTPATIENT
Start: 2019-10-14

## 2019-10-14 RX ORDER — DIPHENHYDRAMINE HCL 25 MG
50 CAPSULE ORAL ONCE
Status: COMPLETED | OUTPATIENT
Start: 2019-10-14 | End: 2019-10-14

## 2019-10-14 RX ORDER — PREDNISONE 10 MG/1
TABLET ORAL
Qty: 206 TABLET | Refills: 0 | Status: SHIPPED | OUTPATIENT
Start: 2019-10-14 | End: 2019-12-09

## 2019-10-14 RX ORDER — ACETAMINOPHEN 325 MG/1
650 TABLET ORAL ONCE
Status: CANCELLED
Start: 2019-11-11

## 2019-10-14 RX ORDER — DIPHENHYDRAMINE HCL 25 MG
50 CAPSULE ORAL ONCE
Status: CANCELLED | OUTPATIENT
Start: 2019-11-11

## 2019-10-14 RX ADMIN — ACETAMINOPHEN 650 MG: 325 TABLET ORAL at 12:42

## 2019-10-14 RX ADMIN — HYDROCORTISONE SODIUM SUCCINATE 100 MG: 100 INJECTION, POWDER, FOR SOLUTION INTRAMUSCULAR; INTRAVENOUS at 12:44

## 2019-10-14 RX ADMIN — DIPHENHYDRAMINE HYDROCHLORIDE 50 MG: 25 CAPSULE ORAL at 12:42

## 2019-10-14 RX ADMIN — HUMAN IMMUNOGLOBULIN G 35 G: 40 LIQUID INTRAVENOUS at 13:05

## 2019-10-14 ASSESSMENT — PAIN SCALES - GENERAL: PAINLEVEL: NO PAIN (0)

## 2019-10-14 ASSESSMENT — MIFFLIN-ST. JEOR: SCORE: 1245.18

## 2019-10-14 NOTE — LETTER
10/14/2019         RE: Tricia Sosa  [id number]  [facility] [id number]        Dear Colleague,    Thank you for referring your patient, Tricia Sosa, to the [facility]. Please see a copy of my visit note below.    [facility]  FOLLOW-UP VISIT NOTE    PATIENT NAME: Tricia Sosa MRN # [id number]  DATE OF VISIT: Oct 14, 2019 YOB: 1940    REFERRING PROVIDER: No referring provider defined for this encounter.    DIAGNOSIS: Hemolytic anemia-autoimmune; IgA deficiency    TREATMENT SUMMARY:  Tricia has been diagnosed with IgA deficiency since her around 2000.  She was very sick at the time and had severe diarrhea.  She lost about 40 pounds in weight.  The workup revealed that she had markedly diminished CD4 counts of 48 and was diagnosed with CMV colitis.  Since then she has been followed in hematology clinic at [place] until recently.  She was noted to have anemia which was fairly long-standing.  She was initially referred for anemia in 2004 and also had a bone marrow biopsy done at that time which revealed hypercellular bone marrow with 70-80% cellularity and normal trilineage hematopoiesis and no morphologic features of MDS or lymphoproliferation.  Her anemia was attributed to her chronic underlying disease.  At the next evaluation in 2002 on 4 aggressive anemia there was no evidence of hemolysis, iron deficiency, B12 or folate deficiency.  Bone marrow biopsy was not pursued.  In summer of 2015 she had progressive fatigue, dyspnea on exertion and worsening anemia with a hemoglobin now of 9.  Workup at this time did suggest a hemolytic anemia with elevated LDH at 709, undetectable haptoglobin and elevated unconjugated bilirubin at 3.3.  Monospecific MARIKA was positive for both IgG and complement.  Cold agglutinin screen was positive with very low titer 1:64.  She was started on prednisone 60 mg daily with supplementation of iron folate and B12  starting 7/31/15.  Her prednisone has been slowly tapered and was discontinued off in January 2016.  She was last followed at [facility] on March 10 when she had stable hemoglobin.    Chemotherapy 8/28/2019 9/4/2019 9/12/2019 9/19/2019   Day, Cycle Day 1, Cycle 1 Day 8, Cycle 1 Day 15, Cycle 1 Day 22, Cycle 1   riTUXimab (RITUXAN)  375 mg/m2 375 mg/m2 375 mg/m2 375 mg/m2     SUBJECTIVE   Tricia comes alone for this clinic visit for her hemolytic anemia.    Tricia comes in for a scheduled follow up. She has complete 4 weekly doses of rituximab on 9/19/19. She continued to have marked anemia and fatigue. I started her on prednisone at 60 mg with plans for a slow taper. She comes in for a follow up visit.     She notes that she had burst of energy with prednisone. She still has a hard time sleeping and wakes up at 2 am and 4 am. She keeps working around the house after she wakes up. She denies any SOB unless she does moderate exertion.     Wt Readings from Last 10 Encounters:   10/02/19 65.5 kg (144 lb 6.4 oz)   09/20/19 66.7 kg (147 lb)   09/12/19 66.5 kg (146 lb 9.6 oz)   09/11/19 65.8 kg (145 lb)   09/04/19 65.3 kg (144 lb)   08/28/19 66 kg (145 lb 9.6 oz)   08/23/19 65.5 kg (144 lb 6.4 oz)   08/20/19 66.1 kg (145 lb 12.8 oz)   08/08/19 65.5 kg (144 lb 4.8 oz)   08/05/19 65.8 kg (145 lb)       PAST MEDICAL HISTORY   1. Common variable immunodeficiency syndrome  2. 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 Selective IgA deficiency  4. Leukopenia with markedly low CD4 counts on Bactrim prophylaxis  5. History of fall with subdural hematoma in 6/11/14 with complete resolution  6. Hashimoto's disease status post partial thyroidectomy      CURRENT OUTPATIENT MEDICATIONS     Current Outpatient Medications   Medication Sig     cyanocobalamin (VITAMIN  B-12) 1000 MCG tablet      fluocinolone (SYNALAR) 0.01 % external cream Apply topically 2 times daily     folic acid (FOLVITE) 1  MG tablet      ketoconazole (NIZORAL) 2 % external shampoo Use every other day to scalp as needed     levothyroxine (SYNTHROID/LEVOTHROID) 150 MCG tablet Take 1 tablet (150 mcg) by mouth daily     predniSONE (DELTASONE) 20 MG tablet Take 3 tablets (60 mg) by mouth daily for 14 days, THEN 2 tablets (40 mg) daily for 14 days.     sulfamethoxazole-trimethoprim (BACTRIM DS/SEPTRA DS) 800-160 MG tablet Take 1 pill orally on Mon, Wed and Friday     UNABLE TO FIND privigen inj monthly     No current facility-administered medications for this visit.         ALLERGIES     Allergies   Allergen Reactions     Doxycycline         REVIEW OF SYSTEMS   As above in the HPI, o/w complete 12-point ROS was negative.     PHYSICAL EXAM   LMP  (LMP Unknown)     SpO2 Readings from Last 4 Encounters:   11/13/18 99%   10/16/18 99%   09/27/18 98%   09/19/18 100%     Wt Readings from Last 3 Encounters:   10/02/19 65.5 kg (144 lb 6.4 oz)   09/20/19 66.7 kg (147 lb)   09/12/19 66.5 kg (146 lb 9.6 oz)     GEN: NAD  HEENT: PERRL, EOMI, no icterus, injection or pallor. Oropharynx is clear.  NECK: no cervical or supraclavicular lymphadenopathy  LUNGS: clear bilaterally  CV: regular, no murmurs, rubs, or gallops  ABDOMEN: soft, non-tender, non-distended, normal bowel sounds, no hepatosplenomegaly by percussion or palpation  EXT: warm, well perfused, no edema  NEURO: alert  SKIN: no rashes     LABORATORY AND IMAGING STUDIES     Recent Labs   Lab Test 10/14/19  1208 09/19/19  0940 08/23/19  0954 08/20/19  1050 07/16/19  1000    141 140 136 138   POTASSIUM 3.6 4.0 4.2 4.4 3.9   CHLORIDE 102 107 109 106 106   CO2 29 27 28 26 29   ANIONGAP 3 7 3 4 3   BUN 18 13 14 16 14   CR 0.58 0.77 0.68 0.72 0.65   * 86 89 75 91   CULLEN 8.3* 8.0* 8.1* 8.2* 8.1*     No results for input(s): MAG, PHOS in the last 56755 hours.  Recent Labs   Lab Test 10/14/19  1208 09/19/19  0940 09/12/19  0938 09/04/19  0945 08/28/19  0932   WBC 7.2 4.0 4.9 5.7 4.0   HGB 9.9*  8.0* 8.3* 8.5* 8.7*   * 54* 54* 90* 63*   MCV 93 107* 108* 106* 105*   NEUTROPHIL 87.3 73.0 54.0 75.0 61.7     Recent Labs   Lab Test 10/14/19  1208 09/19/19  0940 08/23/19  0954   BILITOTAL 0.7 1.7* 1.5*   ALKPHOS 89 101 95   ALT 34 37 33   AST 33 95* 80*   ALBUMIN 3.9 3.7 3.5   * 970* 953*     TSH   Date Value Ref Range Status   10/16/2018 2.81 0.40 - 4.00 mU/L Final   10/20/2017 1.90 0.40 - 4.00 mU/L Final   06/19/2017 1.82 0.40 - 4.00 mU/L Final        ASSESSMENT AND PLAN   1. 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 Common variable immunodeficiency disorder  4. Splenomegaly    Tricia comes in for a scheduled follow up visit. She is doing much better with her prednisone. She had burst of energy and denied any SOB. I started her on 60 mg po daily and then transitioned to 40 mg in 2 weeks. The last time she was tapered over 6 months.     I will do slow taper for her from here on. I will continue on 40 mg for 2 more weeks - total of 4 weeks; then bring her down to 30 mg and 20 mg for a month each. We will be slower in our taper after that.      I will have to start her on bactrim given the persistent prednisone use to prevent infection with pneumocystis.     We will continue with her monthly IV Ig that she has been doing all along.        Jose Summers  Adj ,  Division of Hematology, Oncology & Transplantation  [facility].        Again, thank you for allowing me to participate in the care of your patient.        Sincerely,        Jose Summers MD

## 2019-10-14 NOTE — PROGRESS NOTES
"Infusion Nursing Note:  Maandrew Sosa presents today for IVIG.    Patient seen by provider today: Yes: Melina   present during visit today: Not Applicable.    Note: IVIG initiated at 0.5mg/kg/hr.  Increased by  1.0mg/kg every 15 minutes to max of 4mg/kg/hr    Pt was given Tylenol and Benadryl as premeds by another RN.  Pt does not usually take these as premeds for IVIG.  Did start to feel \"woozy\" in the head about an hour after receiving.  Slept for most of infusion after that.    Intravenous Access:  Peripheral IV placed.    Treatment Conditions:  Biological Infusion Checklist:  ~~~ NOTE: If the patient answers yes to any of the questions below, hold the infusion and contact ordering provider or on-call provider.    1. Have you recently had an elevated temperature, fever, chills, productive cough, coughing for 3 weeks or longer or hemoptysis, abnormal vital signs, night sweats,  chest pain or have you noticed a decrease in your appetite, unexplained weight loss or fatigue? No  2. Do you have any open wounds or new incisions? No  3. Do you have any recent or upcoming hospitalizations, surgeries or dental procedures? No  4. Do you currently have or recently have had any signs of illness or infection or are you on any antibiotics? No  5. Have you had any new, sudden or worsening abdominal pain? No  6. Have you or anyone in your household received a live vaccination in the past 4 weeks? Please note:  No live vaccines while on biologic/chemotherapy until 6 months after the last treatment.  Patient can receive the flu vaccine (shot only) and the pneumovax.  It is optimal for the patient to get these vaccines mid cycle, but they can be given at any time as long as it is not on the day of the infusion. No  7. Have you recently been diagnosed with any new nervous system diseases (ie. Multiple sclerosis, Guillain [place], seizures, neurological changes) or cancer diagnosis? No  8. Are you on any form of radiation or " chemotherapy? No  9. Are you pregnant or breast feeding or do you have plans of pregnancy in the future? No  10. Have you been having any signs of worsening depression or suicidal ideations?  (benlysta only) No  11. Have there been any other new onset medical symptoms? No        Post Infusion Assessment:  Patient tolerated infusion without incident.  Blood return noted pre and post infusion.  Site patent and intact, free from redness, edema or discomfort.  No evidence of extravasations.  Access discontinued per protocol.       Discharge Plan:   Discharge instructions reviewed with: Patient.  Patient and/or family verbalized understanding of discharge instructions and all questions answered.  AVS to patient via [employer].  Patient will return 11/11/19 for RC/IVIG for next appointment.   Patient discharged in stable condition accompanied by: self.  Departure Mode: Ambulatory.    Estrellita Davis, RN, RN

## 2019-10-14 NOTE — NURSING NOTE
Oncology Rooming Note    October 14, 2019 12:03 PM   Tricia Sosa is a 78 year old female who presents for:    Chief Complaint   Patient presents with     Oncology Clinic Visit     CVID (common variable immunodeficiency     Initial Vitals: Pulse 80   Temp 98.2  F (36.8  C) (Oral)   Resp 16   Ht 1.829 m (6')   Wt 65.3 kg (144 lb)   LMP  (LMP Unknown)   SpO2 98%   BMI 19.53 kg/m   Estimated body mass index is 19.53 kg/m  as calculated from the following:    Height as of this encounter: 1.829 m (6').    Weight as of this encounter: 65.3 kg (144 lb). Body surface area is 1.82 meters squared.  Data Unavailable Comment: Data Unavailable   No LMP recorded (lmp unknown). Patient is postmenopausal.  Allergies reviewed: Yes  Medications reviewed: Yes    Medications: Medication refills not needed today.  Pharmacy name entered into [employer]:    [place], [place] - [address] [id number] [place], [place] - [address] [facility]    Clinical concerns: f/u       Barbara Corrigan, CMA

## 2019-10-14 NOTE — PROGRESS NOTES
[facility] CANCER CLINIC  FOLLOW-UP VISIT NOTE    PATIENT NAME: Tricia Sosa MRN # [id number]  DATE OF VISIT: Oct 14, 2019 YOB: 1940    REFERRING PROVIDER: No referring provider defined for this encounter.    DIAGNOSIS: Hemolytic anemia-autoimmune; IgA deficiency    TREATMENT SUMMARY:  Tricia has been diagnosed with IgA deficiency since her around 2000.  She was very sick at the time and had severe diarrhea.  She lost about 40 pounds in weight.  The workup revealed that she had markedly diminished CD4 counts of 48 and was diagnosed with CMV colitis.  Since then she has been followed in hematology clinic at [place] until recently.  She was noted to have anemia which was fairly long-standing.  She was initially referred for anemia in 2004 and also had a bone marrow biopsy done at that time which revealed hypercellular bone marrow with 70-80% cellularity and normal trilineage hematopoiesis and no morphologic features of MDS or lymphoproliferation.  Her anemia was attributed to her chronic underlying disease.  At the next evaluation in 2002 on 4 aggressive anemia there was no evidence of hemolysis, iron deficiency, B12 or folate deficiency.  Bone marrow biopsy was not pursued.  In summer of 2015 she had progressive fatigue, dyspnea on exertion and worsening anemia with a hemoglobin now of 9.  Workup at this time did suggest a hemolytic anemia with elevated LDH at 709, undetectable haptoglobin and elevated unconjugated bilirubin at 3.3.  Monospecific MARIKA was positive for both IgG and complement.  Cold agglutinin screen was positive with very low titer 1:64.  She was started on prednisone 60 mg daily with supplementation of iron folate and B12 starting 7/31/15.  Her prednisone has been slowly tapered and was discontinued off in January 2016.  She was last followed at [facility] on March 10 when she had stable hemoglobin.    Chemotherapy 8/28/2019 9/4/2019 9/12/2019 9/19/2019   Day,  Cycle Day 1, Cycle 1 Day 8, Cycle 1 Day 15, Cycle 1 Day 22, Cycle 1   riTUXimab (RITUXAN)  375 mg/m2 375 mg/m2 375 mg/m2 375 mg/m2     SUBJECTIVE   Tricia comes alone for this clinic visit for her hemolytic anemia.    Tricia comes in for a scheduled follow up. She has complete 4 weekly doses of rituximab on 9/19/19. She continued to have marked anemia and fatigue. I started her on prednisone at 60 mg with plans for a slow taper. She comes in for a follow up visit.     She notes that she had burst of energy with prednisone. She still has a hard time sleeping and wakes up at 2 am and 4 am. She keeps working around the house after she wakes up. She denies any SOB unless she does moderate exertion.     Wt Readings from Last 10 Encounters:   10/02/19 65.5 kg (144 lb 6.4 oz)   09/20/19 66.7 kg (147 lb)   09/12/19 66.5 kg (146 lb 9.6 oz)   09/11/19 65.8 kg (145 lb)   09/04/19 65.3 kg (144 lb)   08/28/19 66 kg (145 lb 9.6 oz)   08/23/19 65.5 kg (144 lb 6.4 oz)   08/20/19 66.1 kg (145 lb 12.8 oz)   08/08/19 65.5 kg (144 lb 4.8 oz)   08/05/19 65.8 kg (145 lb)       PAST MEDICAL HISTORY   1. Common variable immunodeficiency syndrome  2. 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 Selective IgA deficiency  4. Leukopenia with markedly low CD4 counts on Bactrim prophylaxis  5. History of fall with subdural hematoma in 6/11/14 with complete resolution  6. Hashimoto's disease status post partial thyroidectomy      CURRENT OUTPATIENT MEDICATIONS     Current Outpatient Medications   Medication Sig     cyanocobalamin (VITAMIN  B-12) 1000 MCG tablet      fluocinolone (SYNALAR) 0.01 % external cream Apply topically 2 times daily     folic acid (FOLVITE) 1 MG tablet      ketoconazole (NIZORAL) 2 % external shampoo Use every other day to scalp as needed     levothyroxine (SYNTHROID/LEVOTHROID) 150 MCG tablet Take 1 tablet (150 mcg) by mouth daily     predniSONE (DELTASONE) 20 MG tablet Take 3  tablets (60 mg) by mouth daily for 14 days, THEN 2 tablets (40 mg) daily for 14 days.     sulfamethoxazole-trimethoprim (BACTRIM DS/SEPTRA DS) 800-160 MG tablet Take 1 pill orally on Mon, Wed and Friday     UNABLE TO FIND privigen inj monthly     No current facility-administered medications for this visit.         ALLERGIES     Allergies   Allergen Reactions     Doxycycline         REVIEW OF SYSTEMS   As above in the HPI, o/w complete 12-point ROS was negative.     PHYSICAL EXAM   LMP  (LMP Unknown)     SpO2 Readings from Last 4 Encounters:   11/13/18 99%   10/16/18 99%   09/27/18 98%   09/19/18 100%     Wt Readings from Last 3 Encounters:   10/02/19 65.5 kg (144 lb 6.4 oz)   09/20/19 66.7 kg (147 lb)   09/12/19 66.5 kg (146 lb 9.6 oz)     GEN: NAD  HEENT: PERRL, EOMI, no icterus, injection or pallor. Oropharynx is clear.  NECK: no cervical or supraclavicular lymphadenopathy  LUNGS: clear bilaterally  CV: regular, no murmurs, rubs, or gallops  ABDOMEN: soft, non-tender, non-distended, normal bowel sounds, no hepatosplenomegaly by percussion or palpation  EXT: warm, well perfused, no edema  NEURO: alert  SKIN: no rashes     LABORATORY AND IMAGING STUDIES     Recent Labs   Lab Test 10/14/19  1208 09/19/19  0940 08/23/19  0954 08/20/19  1050 07/16/19  1000    141 140 136 138   POTASSIUM 3.6 4.0 4.2 4.4 3.9   CHLORIDE 102 107 109 106 106   CO2 29 27 28 26 29   ANIONGAP 3 7 3 4 3   BUN 18 13 14 16 14   CR 0.58 0.77 0.68 0.72 0.65   * 86 89 75 91   CULLEN 8.3* 8.0* 8.1* 8.2* 8.1*     No results for input(s): MAG, PHOS in the last 47266 hours.  Recent Labs   Lab Test 10/14/19  1208 09/19/19  0940 09/12/19  0938 09/04/19  0945 08/28/19  0932   WBC 7.2 4.0 4.9 5.7 4.0   HGB 9.9* 8.0* 8.3* 8.5* 8.7*   * 54* 54* 90* 63*   MCV 93 107* 108* 106* 105*   NEUTROPHIL 87.3 73.0 54.0 75.0 61.7     Recent Labs   Lab Test 10/14/19  1208 09/19/19  0940 08/23/19  0954   BILITOTAL 0.7 1.7* 1.5*   ALKPHOS 89 101 95   ALT 34  37 33   AST 33 95* 80*   ALBUMIN 3.9 3.7 3.5   * 970* 953*     TSH   Date Value Ref Range Status   10/16/2018 2.81 0.40 - 4.00 mU/L Final   10/20/2017 1.90 0.40 - 4.00 mU/L Final   06/19/2017 1.82 0.40 - 4.00 mU/L Final        ASSESSMENT AND PLAN   1. 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 Common variable immunodeficiency disorder  4. Splenomegaly    Tricia comes in for a scheduled follow up visit. She is doing much better with her prednisone. She had burst of energy and denied any SOB. I started her on 60 mg po daily and then transitioned to 40 mg in 2 weeks. The last time she was tapered over 6 months.     I will do slow taper for her from here on. I will continue on 40 mg for 2 more weeks - total of 4 weeks; then bring her down to 30 mg and 20 mg for a month each. We will be slower in our taper after that.      I will have to start her on bactrim given the persistent prednisone use to prevent infection with pneumocystis.     We will continue with her monthly IV Ig that she has been doing all along.        Jose Summers  Adj ,  Division of Hematology, Oncology & Transplantation  [facility].

## 2019-10-15 LAB
HAPTOGLOB SERPL-MCNC: <6 MG/DL (ref 35–175)
IGA SERPL-MCNC: 7 MG/DL (ref 70–380)
IGG SERPL-MCNC: 788 MG/DL (ref 695–1620)
IGM SERPL-MCNC: 109 MG/DL (ref 60–265)

## 2019-10-16 PROBLEM — R29.898 WEAKNESS OF BOTH LOWER EXTREMITIES: Status: RESOLVED | Noted: 2019-08-15 | Resolved: 2019-10-16

## 2019-10-29 ENCOUNTER — NURSE TRIAGE (OUTPATIENT)
Dept: INTERNAL MEDICINE | Facility: CLINIC | Age: 79
End: 2019-10-29

## 2019-10-29 NOTE — TELEPHONE ENCOUNTER
Reason for Disposition    Chest pain lasting longer than 5 minutes and ANY of the following:* Over 50 years old* Over 30 years old and at least one cardiac risk factor (i.e., high blood pressure, diabetes, high cholesterol, obesity, smoker or strong family history of heart disease)* Pain is crushing, pressure-like, or heavy * Took nitroglycerin and chest pain was not relieved* History of heart disease (i.e., angina, heart attack, bypass surgery, angioplasty, CHF)    Additional Information    Negative: Sounds like a life-threatening emergency to the triager    Negative: Visible sweat on face or sweat dripping down face    Negative: Followed an injury to chest    Negative: SEVERE chest pain    Negative: Pain also present in shoulder(s) or arm(s) or jaw    Negative: Difficulty breathing    Negative: Cocaine use within last 3 days    Negative: History of prior 'blood clot' in leg or lungs (i.e., deep vein thrombosis, pulmonary embolism)    Negative: Recent illness requiring prolonged bed rest (i.e., immobilization)    Negative: Hip or leg fracture in past 2 months (e.g, or had cast on leg or ankle)    Negative: Major surgery in the past month    Negative: Recent long-distance travel with prolonged time in car, bus, plane, or train (i.e., within past 2 weeks; 6 or more hours duration)    Negative: Heart beating irregularly or very rapidly    Negative: Patient sounds very sick or weak to the triager    Negative: Coughing up blood    Negative: Dizziness or lightheadedness    Negative: Intermittent chest pain and pain has been increasing in severity or frequency    Chest pain lasting longer than 5 minutes    Negative: Fever > 100.5 F (38.1 C)    Negative: Intermittent chest pains persist > 3 days    Negative: All other patients with chest pain    Negative: Patient wants to be seen    Negative: Intermittent mild chest pain lasting a few seconds each time    Answer Assessment - Initial Assessment Questions  1. LOCATION:  "\"Where does it hurt?\"        SEE DOCUMENTATION  2. RADIATION: \"Does the pain go anywhere else?\" (e.g., into neck, jaw, arms, back)      *No Answer*  3. ONSET: \"When did the chest pain begin?\" (Minutes, hours or days)       *No Answer*  4. PATTERN \"Does the pain come and go, or has it been constant since it started?\"  \"Does it get worse with exertion?\"       *No Answer*  5. DURATION: \"How long does it last\" (e.g., seconds, minutes, hours)      *No Answer*  6. SEVERITY: \"How bad is the pain?\"  (e.g., Scale 1-10; mild, moderate, or severe)     - MILD (1-3): doesn't interfere with normal activities      - MODERATE (4-7): interferes with normal activities or awakens from sleep     - SEVERE (8-10): excruciating pain, unable to do any normal activities        *No Answer*  7. CARDIAC RISK FACTORS: \"Do you have any history of heart problems or risk factors for heart disease?\" (e.g., prior heart attack, angina; high blood pressure, diabetes, being overweight, high cholesterol, smoking, or strong family history of heart disease)      *No Answer*  8. PULMONARY RISK FACTORS: \"Do you have any history of lung disease?\"  (e.g., blood clots in lung, asthma, emphysema, birth control pills)      *No Answer*  9. CAUSE: \"What do you think is causing the chest pain?\"      *No Answer*  10. OTHER SYMPTOMS: \"Do you have any other symptoms?\" (e.g., dizziness, nausea, vomiting, sweating, fever, difficulty breathing, cough)        *No Answer*  11. PREGNANCY: \"Is there any chance you are pregnant?\" \"When was your last menstrual period?\"        *No Answer*    Protocols used: CHEST PAIN-A-OH    "

## 2019-10-29 NOTE — TELEPHONE ENCOUNTER
"Patient experienced sudden onset of right sided chest pain last night just as she was falling asleep.  Describes 3 separate pains, one right beneath right breast, one at bottom of sternum and one at throat.  Describes pain as a sharp pain, \"not severe\" and rates pain \"in the middle\" when asked to rate pain on 1-10 scale. Total duration 15 minutes, reports all pain went away quickly after she took a sip of water.  After pain went away patient states she was more aware of her heartbeat, could feel it though it wasn't pounding, irregular or fast.  She was able to relax and go to sleep rather quickly.  There had been no change in level of pain when walking around house, denies experiencing any SOB, nausea, diaphoresis. Denies radiation of pain into back, around sides, into shoulders or arms.  No history of similar pain.      Patient with history of hemolytic anemia, was having shortness of breath about 2 months ago, on Prednisone now and breathing is fine.  Had an echocardiogram at that time also.  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   No recent long car trips or flights. No travel out of country. Denies history of heart disease in self or family    Another issue:  Patient states she also gets pain and abdomen bulges out when crouching, bending or exerting herself.  Is very intermittent and feels it may be a hernia.  This has been going on for years.  She reports being able to \"push it back in easily\".    Advised to be seen in ER if chest pain returns or abdominal pain worsens.  She has been scheduled to see primary care provider Thursday 10/31/19 unless primary care provider can see sooner than that.  CT Blancas R.N.      "

## 2019-10-31 ENCOUNTER — OFFICE VISIT (OUTPATIENT)
Dept: INTERNAL MEDICINE | Facility: CLINIC | Age: 79
End: 2019-10-31
Payer: MEDICARE

## 2019-10-31 VITALS
HEART RATE: 83 BPM | OXYGEN SATURATION: 100 % | TEMPERATURE: 97 F | RESPIRATION RATE: 18 BRPM | BODY MASS INDEX: 19.79 KG/M2 | HEIGHT: 72 IN | DIASTOLIC BLOOD PRESSURE: 69 MMHG | WEIGHT: 146.1 LBS | SYSTOLIC BLOOD PRESSURE: 123 MMHG

## 2019-10-31 DIAGNOSIS — R07.9 CHEST PAIN, UNSPECIFIED TYPE: Primary | ICD-10-CM

## 2019-10-31 DIAGNOSIS — R15.9 INCONTINENCE OF FECES, UNSPECIFIED FECAL INCONTINENCE TYPE: ICD-10-CM

## 2019-10-31 PROCEDURE — 99214 OFFICE O/P EST MOD 30 MIN: CPT | Performed by: INTERNAL MEDICINE

## 2019-10-31 ASSESSMENT — MIFFLIN-ST. JEOR: SCORE: 1249.71

## 2019-10-31 NOTE — NURSING NOTE
/69 (BP Location: Left arm, Patient Position: Sitting, Cuff Size: Adult Regular)   Pulse 83   Temp 97  F (36.1  C) (Oral)   Resp 18   Ht 1.829 m (6')   Wt 66.3 kg (146 lb 1.6 oz)   LMP  (LMP Unknown)   SpO2 100%   BMI 19.81 kg/m    Patient is here for chest and throat pain and bowel leakage.

## 2019-10-31 NOTE — PROGRESS NOTES
Subjective     Tricia Sosa is a 79 year old female who presents to clinic today for the following health issues:    HPI   1.  Episode of chest pain when lying down 2 nights ago: This started as a fairly intense pain at her right lower chest, under the breast.  A very short time later she felt some pain up it the base of her throat and then some at the subxiphoid area.  These stayed steady, no associated symptoms.  She got up and drink some water and it went away within seconds.  She has not had any since then.  She has not had any other chest pains prior to that, no exertional pain or shortness of breath.     2.  She has been having problems the past month with rectal incontinence.  This will happen fairly suddenly, she wondered if it could be due to medication, is on some prednisone that was relatively new, but received a dose of Rituxan for 4 weeks in August of September.    3.  She feels sometimes there is are some areas of her abdominal wall that seem to bulge out when she is bending like reading or trying to do toenails.  There are different places where she has felt this.  There is no other bulging at any other times.  No pain associated with it.      Patient Active Problem List   Diagnosis     Lymphocytic colitis     Acquired hypothyroidism     CVID (common variable immunodeficiency) (H)     B12 deficiency     CARDIOVASCULAR SCREENING; LDL GOAL LESS THAN 130     Other autoimmune hemolytic anemias (H)     Right-sided low back pain with right-sided sciatica, unspecified chronicity     Current Outpatient Medications   Medication Sig Dispense Refill     cyanocobalamin (VITAMIN  B-12) 1000 MCG tablet   3     folic acid (FOLVITE) 1 MG tablet   3     ketoconazole (NIZORAL) 2 % external shampoo Use every other day to scalp as needed 120 mL 11     levothyroxine (SYNTHROID/LEVOTHROID) 150 MCG tablet Take 1 tablet (150 mcg) by mouth daily 90 tablet 0     predniSONE (DELTASONE) 10 MG tablet Take 4 tablets (40 mg) by  mouth daily for 14 days, THEN 3 tablets (30 mg) daily for 30 days, THEN 2 tablets (20 mg) daily. 206 tablet 0     predniSONE (DELTASONE) 20 MG tablet Take 3 tablets (60 mg) by mouth daily for 14 days, THEN 2 tablets (40 mg) daily for 14 days. 70 tablet 0     sulfamethoxazole-trimethoprim (BACTRIM DS/SEPTRA DS) 800-160 MG tablet Take 1 pill orally on Mon, Wed and Friday 45 tablet 3     UNABLE TO FIND privigen inj monthly        Social History     Tobacco Use     Smoking status: Never Smoker     Smokeless tobacco: Never Used   Substance Use Topics     Alcohol use: No     Alcohol/week: 0.0 standard drinks     Frequency: Never     Drug use: No            Reviewed and updated as needed this visit by Provider         Review of Systems   No fever, chills, nausea, vomiting, palpitations, shortness of breath, sweating associated with the episode of chest pain in the night.  No water brash.  No other abdominal pain.  No diarrhea, blood in the stool or rectal pain      Objective    /69 (BP Location: Left arm, Patient Position: Sitting, Cuff Size: Adult Regular)   Pulse 83   Temp 97  F (36.1  C) (Oral)   Resp 18   Ht 1.829 m (6')   Wt 66.3 kg (146 lb 1.6 oz)   LMP  (LMP Unknown)   SpO2 100%   BMI 19.81 kg/m    Body mass index is 19.81 kg/m .  Physical Exam     Lungs clear  CV: normal S1, S2 without murmur, S3 or S4.   Abdomen: Bowel sounds normal, soft, nontender. No hepatosplenomegaly. No masses.   There are no hernias present when she is doing Valsalva or straining at the abdomen.           Assessment & Plan     1. Chest pain, unspecified type  Reassured this was probably an episode of acid reflux.  Can use an antacid or drink some liquids if it recurs.  If it is happening frequently may need an acid blocker.  Reassured I do not find any evidence of any hernias of the abdominal wall.    2. Incontinence of feces, unspecified fecal incontinence type  Advised this is probably not a medication effect, suggest that  she see colon rectal.  - COLORECTAL SURGERY REFERRAL           No follow-ups on file.    Emily Church MD  [facility]

## 2019-11-03 ENCOUNTER — DOCUMENTATION ONLY (OUTPATIENT)
Dept: SLEEP MEDICINE | Facility: CLINIC | Age: 79
End: 2019-11-03

## 2019-11-03 ENCOUNTER — THERAPY VISIT (OUTPATIENT)
Dept: SLEEP MEDICINE | Facility: CLINIC | Age: 79
End: 2019-11-03
Payer: MEDICARE

## 2019-11-03 DIAGNOSIS — G47.9 SLEEP DISTURBANCE: ICD-10-CM

## 2019-11-03 PROCEDURE — 95810 POLYSOM 6/> YRS 4/> PARAM: CPT | Performed by: INTERNAL MEDICINE

## 2019-11-09 NOTE — PROCEDURES
SLEEP STUDY INTERPRETATION  DIAGNOSTIC POLYSOMNOGRAPHY REPORT      Patient: JC MARRUFO  YOB: 1940  Study Date: 11/3/2019  MRN: [id number]  Referring Provider: Emily Church MD  Ordering Provider: Ne Muniz MD    Indications for Polysomnography: The patient is a 79 y old Female who is 6' and weighs 144.0 lbs. Her BMI is 19.7, Sinclair sleepiness scale 7.0 and neck circumference is 36.0 cm. 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 She reports snoring, dyspnea on exertion and fatigue. A diagnostic polysomnogram was performed to evaluate for sleep apnea.    Polysomnogram Data: A full night polysomnogram recorded the standard physiologic parameters including EEG, EOG, EMG, ECG, nasal and oral airflow. Respiratory parameters of chest and abdominal movements were recorded with respiratory inductance plethysmography. Oxygen saturation was recorded by pulse oximetry. Hypopnea scoring rule used: 1B 4%.    Sleep Architecture: Sleep efficiency was reduced.  All sleep stages were seen.    The total recording time of the polysomnogram was 441.3 minutes. The total sleep time was 362.5 minutes. Sleep latency was normal at 23.0 minutes without the use of a sleep aid. REM latency was 86.0 minutes. Arousal index was normal at 12.1 arousals per hour. Sleep efficiency was decreased at 82.1%. Wake after sleep onset was 46.5 minutes. The patient spent 7.6% of total sleep time in Stage N1, 54.2% in Stage N2, 21.4% in Stage N3, and 16.8% in REM. Time in REM supine was 35.5 minutes.    Respiration: Mild obstructive sleep apnea. Obstructive events were predominant during  supine sleep position and during REM sleep, particularly more pronounced during REM sleep in supine position.      Events ? The polysomnogram revealed a presence of 22 obstructive, 6 central, and 2 mixed apneas resulting in an apnea index of 5.0 events per hour. There were 9 obstructive hypopneas and - central hypopneas resulting in an obstructive hypopnea index of 1.5 and central hypopnea index of - events per hour. The combined apnea/hypopnea index was 6.5 events per hour (central apnea/hypopnea index was 1.0 events per hour). The REM AHI was 33.4 events per hour. The supine AHI was 11.5 events per hour. The RERA index was 1.2 events per hour.  The RDI was 7.6 events per hour.    Snoring - was reported as absent.    Respiratory rate and pattern - was notable for normal respiratory rate and pattern.    Sustained Sleep Associated Hypoventilation - Transcutaneous carbon dioxide monitoring was not used, however significant hypoventilation was not suggested by oximetry.    Sleep Associated Hypoxemia - (Greater than 5 minutes O2 sat at or below 88%) was not present. Baseline oxygen saturation was 96.9%. Lowest oxygen saturation was 84.7%. Time spent less than or equal to 88% was 0 minutes. Time spent less than or equal to 89% was 0.1 minutes.    Movement Activity: There are no limb movements during the study. Abnormal sleep-related behaviors were not noted.    Periodic Limb Activity - There were - PLMs during the entire study. The PLM index was - movements per hour. The PLM Arousal Index was - per hour.    REM EMG Activity - Excessive transient/sustained muscle activity was not present.    Nocturnal Behavior - Abnormal sleep related behaviors were not noted during/arising out of NREM / REM sleep.    Bruxism - None apparent.    Cardiac Summary: normal sinus rhythm was noted with occasional premature atrial contractions  The average pulse rate was 59.1 bpm. The minimum pulse rate was 51.6 bpm while the maximum pulse rate  was 89.5 bpm.  Normal sinus rhythm was noted with occasional premature atrial contractions.    Assessment:     Mild obstructive sleep apnea was seen. Obstructive events were predominant during supine sleep position and during REM sleep, particularly more pronounced during REM sleep in supine position.      Sleep efficiency was reduced.  All sleep stages were seen.      There are no frequent limb movements during the study.     Abnormal sleep-related behaviors were not noted.    Normal sinus rhythm was noted with occasional premature atrial contractions.    Recommendations:    Suggest the following options for treatment for MARIE:  ? Auto?titrating CPAP with a range of 5-15 cmH2O. Recommend clinical follow up with sleep management team. or  ? Positional therapy during sleep   ? Dental appliance through referral to Sleep Dentistry     Advice regarding the risks of drowsy driving.    Suggest optimizing sleep schedule and avoiding sleep deprivation.    Weight management (if BMI > 30).    Pharmacologic therapy should be used for management of restless legs syndrome only if present and clinically indicated and not based on the presence of periodic limb movements alone.    Diagnostic Codes:   Obstructive Sleep Apnea G47.33    11/3/2019 [place] [facility] Sleep Study (144.0 lbs) - AHI 6.5, RDI 7.6, Supine AHI 11.5, REM AHI 33.4, Low O2 84.7%, Time Spent ?88% 0 minutes / Time Spent ?89% 0.1 minutes.     _____________________________________   Electronically Signed By: (Ne Muniz MD), 11/9/2019

## 2019-11-11 ENCOUNTER — HOSPITAL ENCOUNTER (OUTPATIENT)
Facility: CLINIC | Age: 79
Setting detail: SPECIMEN
Discharge: HOME | End: 2019-11-11
Attending: INTERNAL MEDICINE | Admitting: INTERNAL MEDICINE
Payer: MEDICARE

## 2019-11-11 ENCOUNTER — INFUSION THERAPY VISIT (OUTPATIENT)
Dept: INFUSION THERAPY | Facility: CLINIC | Age: 79
End: 2019-11-11
Attending: INTERNAL MEDICINE
Payer: MEDICARE

## 2019-11-11 ENCOUNTER — PATIENT OUTREACH (OUTPATIENT)
Dept: ONCOLOGY | Facility: CLINIC | Age: 79
End: 2019-11-11

## 2019-11-11 VITALS
DIASTOLIC BLOOD PRESSURE: 66 MMHG | WEIGHT: 147.5 LBS | BODY MASS INDEX: 20 KG/M2 | OXYGEN SATURATION: 100 % | SYSTOLIC BLOOD PRESSURE: 133 MMHG | RESPIRATION RATE: 16 BRPM | TEMPERATURE: 97.1 F | HEART RATE: 72 BPM

## 2019-11-11 DIAGNOSIS — E87.6 HYPOKALEMIA: Primary | ICD-10-CM

## 2019-11-11 DIAGNOSIS — D59.19 OTHER AUTOIMMUNE HEMOLYTIC ANEMIAS: ICD-10-CM

## 2019-11-11 DIAGNOSIS — D83.9 CVID (COMMON VARIABLE IMMUNODEFICIENCY) (H): Primary | ICD-10-CM

## 2019-11-11 DIAGNOSIS — E53.8 B12 DEFICIENCY: ICD-10-CM

## 2019-11-11 LAB
ALBUMIN SERPL-MCNC: 3.7 G/DL (ref 3.4–5)
ALP SERPL-CCNC: 74 U/L (ref 40–150)
ALT SERPL W P-5'-P-CCNC: 28 U/L (ref 0–50)
ANION GAP SERPL CALCULATED.3IONS-SCNC: 8 MMOL/L (ref 3–14)
AST SERPL W P-5'-P-CCNC: 31 U/L (ref 0–45)
BASOPHILS # BLD AUTO: 0 10E9/L (ref 0–0.2)
BASOPHILS NFR BLD AUTO: 0.2 %
BILIRUB SERPL-MCNC: 0.6 MG/DL (ref 0.2–1.3)
BUN SERPL-MCNC: 17 MG/DL (ref 7–30)
CALCIUM SERPL-MCNC: 8.5 MG/DL (ref 8.5–10.1)
CHLORIDE SERPL-SCNC: 104 MMOL/L (ref 94–109)
CO2 SERPL-SCNC: 27 MMOL/L (ref 20–32)
CREAT SERPL-MCNC: 0.59 MG/DL (ref 0.52–1.04)
DIFFERENTIAL METHOD BLD: ABNORMAL
EOSINOPHIL # BLD AUTO: 0 10E9/L (ref 0–0.7)
EOSINOPHIL NFR BLD AUTO: 0.5 %
ERYTHROCYTE [DISTWIDTH] IN BLOOD BY AUTOMATED COUNT: 14.1 % (ref 10–15)
GFR SERPL CREATININE-BSD FRML MDRD: 87 ML/MIN/{1.73_M2}
GLUCOSE SERPL-MCNC: 145 MG/DL (ref 70–99)
HCT VFR BLD AUTO: 34 % (ref 35–47)
HGB BLD-MCNC: 10.3 G/DL (ref 11.7–15.7)
IMM GRANULOCYTES # BLD: 0 10E9/L (ref 0–0.4)
IMM GRANULOCYTES NFR BLD: 0.2 %
LDH SERPL L TO P-CCNC: 240 U/L (ref 81–234)
LYMPHOCYTES # BLD AUTO: 1.1 10E9/L (ref 0.8–5.3)
LYMPHOCYTES NFR BLD AUTO: 26.2 %
MCH RBC QN AUTO: 26.2 PG (ref 26.5–33)
MCHC RBC AUTO-ENTMCNC: 30.3 G/DL (ref 31.5–36.5)
MCV RBC AUTO: 87 FL (ref 78–100)
MONOCYTES # BLD AUTO: 0.5 10E9/L (ref 0–1.3)
MONOCYTES NFR BLD AUTO: 11.5 %
NEUTROPHILS # BLD AUTO: 2.5 10E9/L (ref 1.6–8.3)
NEUTROPHILS NFR BLD AUTO: 61.4 %
NRBC # BLD AUTO: 0 10*3/UL
NRBC BLD AUTO-RTO: 0 /100
PLATELET # BLD AUTO: 158 10E9/L (ref 150–450)
POTASSIUM SERPL-SCNC: 3.3 MMOL/L (ref 3.4–5.3)
PROT SERPL-MCNC: 6.2 G/DL (ref 6.8–8.8)
RBC # BLD AUTO: 3.93 10E12/L (ref 3.8–5.2)
RETICS # AUTO: 35.4 10E9/L (ref 25–95)
RETICS/RBC NFR AUTO: 0.9 % (ref 0.5–2)
SLPCOMP: NORMAL
SODIUM SERPL-SCNC: 139 MMOL/L (ref 133–144)
VIT B12 SERPL-MCNC: 642 PG/ML (ref 193–986)
WBC # BLD AUTO: 4.1 10E9/L (ref 4–11)

## 2019-11-11 PROCEDURE — 80053 COMPREHEN METABOLIC PANEL: CPT | Performed by: INTERNAL MEDICINE

## 2019-11-11 PROCEDURE — 82784 ASSAY IGA/IGD/IGG/IGM EACH: CPT | Performed by: INTERNAL MEDICINE

## 2019-11-11 PROCEDURE — 82607 VITAMIN B-12: CPT | Performed by: INTERNAL MEDICINE

## 2019-11-11 PROCEDURE — 83010 ASSAY OF HAPTOGLOBIN QUANT: CPT | Performed by: INTERNAL MEDICINE

## 2019-11-11 PROCEDURE — 96365 THER/PROPH/DIAG IV INF INIT: CPT

## 2019-11-11 PROCEDURE — 25000128 H RX IP 250 OP 636: Performed by: INTERNAL MEDICINE

## 2019-11-11 PROCEDURE — 96375 TX/PRO/DX INJ NEW DRUG ADDON: CPT

## 2019-11-11 PROCEDURE — 85025 COMPLETE CBC W/AUTO DIFF WBC: CPT | Performed by: INTERNAL MEDICINE

## 2019-11-11 PROCEDURE — 96366 THER/PROPH/DIAG IV INF ADDON: CPT

## 2019-11-11 PROCEDURE — 85045 AUTOMATED RETICULOCYTE COUNT: CPT | Performed by: INTERNAL MEDICINE

## 2019-11-11 PROCEDURE — 83615 LACTATE (LD) (LDH) ENZYME: CPT | Performed by: INTERNAL MEDICINE

## 2019-11-11 RX ORDER — HEPARIN SODIUM (PORCINE) LOCK FLUSH IV SOLN 100 UNIT/ML 100 UNIT/ML
5 SOLUTION INTRAVENOUS
Status: CANCELLED | OUTPATIENT
Start: 2019-11-11

## 2019-11-11 RX ORDER — POTASSIUM CHLORIDE 1.5 G/1.58G
20 POWDER, FOR SOLUTION ORAL DAILY
Qty: 30 PACKET | Refills: 11 | Status: SHIPPED | OUTPATIENT
Start: 2019-11-11 | End: 2019-11-12

## 2019-11-11 RX ORDER — DIPHENHYDRAMINE HCL 25 MG
50 CAPSULE ORAL ONCE
Status: CANCELLED | OUTPATIENT
Start: 2019-11-11

## 2019-11-11 RX ORDER — HEPARIN SODIUM,PORCINE 10 UNIT/ML
5 VIAL (ML) INTRAVENOUS
Status: CANCELLED | OUTPATIENT
Start: 2019-11-11

## 2019-11-11 RX ORDER — ACETAMINOPHEN 325 MG/1
650 TABLET ORAL ONCE
Status: CANCELLED
Start: 2019-11-11

## 2019-11-11 RX ADMIN — HUMAN IMMUNOGLOBULIN G 35 G: 20 LIQUID INTRAVENOUS at 10:35

## 2019-11-11 RX ADMIN — HYDROCORTISONE SODIUM SUCCINATE 100 MG: 100 INJECTION, POWDER, FOR SOLUTION INTRAMUSCULAR; INTRAVENOUS at 10:29

## 2019-11-11 NOTE — PROGRESS NOTES
Infusion Nursing Note:  Tricia Sosa presents today for IVIG.    Patient seen by provider today: No   present during visit today: Not Applicable.    Note: IVIG started at 0.5mg/kg/hr and increased by 1mg/kg/hr every 15 minutes for max of 4 mg/kg/hr.    Patient doesn't take tylenol or benadryl premeds. Only gets Solu-cortef.    Patient wanted me to draw standing order labs. Said they are monitoring her prednisone dose based on her labs.     Patient potassium 3.3 today. Didn't see results until patient infusion complete and was getting ready for discharge. Dr. Summers notified of potassium level. Asha Shields RN contacting patient about starting potassium supplement of 20 MEQ.     Intravenous Access:  Labs drawn without difficulty.  Peripheral IV placed.    Treatment Conditions:  Biological Infusion Checklist:  ~~~ NOTE: If the patient answers yes to any of the questions below, hold the infusion and contact ordering provider or on-call provider.    1. Have you recently had an elevated temperature, fever, chills, productive cough, coughing for 3 weeks or longer or hemoptysis, abnormal vital signs, night sweats,  chest pain or have you noticed a decrease in your appetite, unexplained weight loss or fatigue? No  2. Do you have any open wounds or new incisions? No  3. Do you have any recent or upcoming hospitalizations, surgeries or dental procedures? No  4. Do you currently have or recently have had any signs of illness or infection or are you on any antibiotics? No  5. Have you had any new, sudden or worsening abdominal pain? No  6. Have you or anyone in your household received a live vaccination in the past 4 weeks? Please note:  No live vaccines while on biologic/chemotherapy until 6 months after the last treatment.  Patient can receive the flu vaccine (shot only) and the pneumovax.  It is optimal for the patient to get these vaccines mid cycle, but they can be given at any time as long as it is not on the  day of the infusion. No  7. Have you recently been diagnosed with any new nervous system diseases (ie. Multiple sclerosis, Guillain [place], seizures, neurological changes) or cancer diagnosis? No  8. Are you on any form of radiation or chemotherapy? No  9. Are you pregnant or breast feeding or do you have plans of pregnancy in the future? No  10. Have you been having any signs of worsening depression or suicidal ideations?  (benlysta only) No  11. Have there been any other new onset medical symptoms? No        Post Infusion Assessment:  Patient tolerated infusion without incident.  Blood return noted pre and post infusion.  Site patent and intact, free from redness, edema or discomfort.  No evidence of extravasations.  Access discontinued per protocol.  Biologic Infusion Post Education: Call the triage nurse at your clinic or seek medical attention if you have chills and/or temperature greater than or equal to 100.5, uncontrolled nausea/vomiting, diarrhea, constipation, dizziness, shortness of breath, chest pain, heart palpitations, weakness or any other new or concerning symptoms, questions or concerns.  You cannot have any live virus vaccines prior to or during treatment or up to 6 months post infusion.  If you have an upcoming surgery, medical procedure or dental procedure during treatment, this should be discussed with your ordering physician and your surgeon/dentist.  If you are having any concerning symptom, if you are unsure if you should get your next infusion or wish to speak to a provider before your next infusion, please call your care coordinator or triage nurse at your clinic to notify them so we can adequately serve you.       Discharge Plan:   Patient declined prescription refills.  Patient and/or family verbalized understanding of discharge instructions and all questions answered.  Patient   Patient discharged in stable condition accompanied by: self.  Departure Mode: Ambulatory and  Wheelchair.    Estrellita Choi RN

## 2019-11-11 NOTE — PROGRESS NOTES
S-(situation): RNCC alerted by infusion staff Estrellita MILAN RN of Pt low potassium. RNCC consulted with Jose Summers MD    B-(background): CVID. Pt received IVIG infusion today    A-(assessment): Reviewed low potassium lab with Dr. Summers.    R-(recommendations): Dr. Summers prescribed supplement potassium 20 MEq daily for 7 days with repeat lab in 7 to 10 days. Writer contacted Pt to inform her Rx sent to [facility] in [place] with instructions.     Pt offered lab appointment 11/20/19 at 10:30 am.     Asha Shields, KALIN, RN, OCN  RN Cancer Care Coordinator  [facility]  [phone]

## 2019-11-12 DIAGNOSIS — E87.6 HYPOKALEMIA: Primary | ICD-10-CM

## 2019-11-12 LAB
HAPTOGLOB SERPL-MCNC: <6 MG/DL (ref 35–175)
IGA SERPL-MCNC: <7 MG/DL (ref 70–380)
IGG SERPL-MCNC: 721 MG/DL (ref 695–1620)
IGM SERPL-MCNC: 43 MG/DL (ref 60–265)

## 2019-11-12 RX ORDER — POTASSIUM CHLORIDE 1500 MG/1
20 TABLET, EXTENDED RELEASE ORAL DAILY
Qty: 30 TABLET | Refills: 11 | Status: SHIPPED | OUTPATIENT
Start: 2019-11-12 | End: 2020-03-16

## 2019-11-14 ENCOUNTER — TRANSFERRED RECORDS (OUTPATIENT)
Dept: HEALTH INFORMATION MANAGEMENT | Facility: CLINIC | Age: 79
End: 2019-11-14

## 2019-11-20 ENCOUNTER — HOSPITAL ENCOUNTER (OUTPATIENT)
Facility: CLINIC | Age: 79
Setting detail: SPECIMEN
Discharge: HOME | End: 2019-11-20
Attending: INTERNAL MEDICINE | Admitting: INTERNAL MEDICINE
Payer: MEDICARE

## 2019-11-20 ENCOUNTER — ONCOLOGY VISIT (OUTPATIENT)
Dept: ONCOLOGY | Facility: CLINIC | Age: 79
End: 2019-11-20
Attending: INTERNAL MEDICINE
Payer: MEDICARE

## 2019-11-20 ENCOUNTER — OFFICE VISIT (OUTPATIENT)
Dept: SLEEP MEDICINE | Facility: CLINIC | Age: 79
End: 2019-11-20
Payer: MEDICARE

## 2019-11-20 VITALS
DIASTOLIC BLOOD PRESSURE: 70 MMHG | HEIGHT: 72 IN | BODY MASS INDEX: 19.91 KG/M2 | OXYGEN SATURATION: 97 % | HEART RATE: 76 BPM | SYSTOLIC BLOOD PRESSURE: 121 MMHG | RESPIRATION RATE: 20 BRPM | WEIGHT: 147 LBS

## 2019-11-20 DIAGNOSIS — E87.6 HYPOKALEMIA: Primary | ICD-10-CM

## 2019-11-20 DIAGNOSIS — G47.33 OBSTRUCTIVE SLEEP APNEA: Primary | ICD-10-CM

## 2019-11-20 DIAGNOSIS — D83.9 CVID (COMMON VARIABLE IMMUNODEFICIENCY) (H): ICD-10-CM

## 2019-11-20 LAB — POTASSIUM SERPL-SCNC: 3.7 MMOL/L (ref 3.4–5.3)

## 2019-11-20 PROCEDURE — 84132 ASSAY OF SERUM POTASSIUM: CPT | Performed by: INTERNAL MEDICINE

## 2019-11-20 PROCEDURE — 36415 COLL VENOUS BLD VENIPUNCTURE: CPT

## 2019-11-20 PROCEDURE — 99213 OFFICE O/P EST LOW 20 MIN: CPT | Performed by: PEDIATRICS

## 2019-11-20 ASSESSMENT — MIFFLIN-ST. JEOR: SCORE: 1253.79

## 2019-11-20 NOTE — PROGRESS NOTES
[facility]  Outpatient Sleep Medicine Encounter      Name: Tricia Sosa MRN# [id number]   Age: 79 year old YOB: 1940     Date of Visit: November 20, 2019  Primary care provider: Emily Church         Assessment and Plan:     Obstructive sleep apnea  I reviewed several treatment options with patient including a sleep positioner device, oral appliance therapy, and CPAP.  She would like to consider her options and then make a decision to move forward . She plans to consider treatment after finishing her prednisone course.          Chief Complaint:   Review sleep study results.         History of Present Illness:   Tricia Sosa is a 79 year old female here to review the results of her sleep study.  She reports that she is currently on a prednisone taper and so her sleep quality is much different compared to times when she is not taking prednisone.  She notes that in the past she had endurance and energy that lasted the entire day but more recently this seems to wane in the afternoon.  She feels worse when she is not taking prednisone.  She denies problems with driving due to sleepiness.    PSG date:  11/3/2019  Sleep latency 23 minutes without sleep aid.  Total sleep time 362 minutes.   Sleep efficiency 82%. Sleep architecture:  Stage 1, 7.6% (normal 5%), stage 2, 54.2% (normal 45-55%), stage 3, 21.4% (normal 15-20%), stage REM, 16.8% (normal 20-25%).  REM latency 86 minutes.      AHI was 6.5/hour.  RDI 7.6/hour.  Supine AHI 11.5.   REM AHI 33.4/hour.     Lowest O2 saturation:  84.7%.  There were 0 minutes with SpO2 below 88%.     I reviewed these results in detail and answered the patient's questions.             Medications:     Current Outpatient Medications   Medication Sig     cyanocobalamin (VITAMIN  B-12) 1000 MCG tablet      folic acid (FOLVITE) 1 MG tablet      ketoconazole (NIZORAL) 2 % external shampoo Use every other day to scalp as needed      levothyroxine (SYNTHROID/LEVOTHROID) 150 MCG tablet Take 1 tablet (150 mcg) by mouth daily     potassium chloride ER (K-DUR/KLOR-CON M) 20 MEQ CR tablet Take 1 tablet (20 mEq) by mouth daily     predniSONE (DELTASONE) 10 MG tablet Take 4 tablets (40 mg) by mouth daily for 14 days, THEN 3 tablets (30 mg) daily for 30 days, THEN 2 tablets (20 mg) daily.     sulfamethoxazole-trimethoprim (BACTRIM DS/SEPTRA DS) 800-160 MG tablet Take 1 pill orally on Mon, Wed and Friday     UNABLE TO FIND privigen inj monthly     No current facility-administered medications for this visit.         Allergies   Allergen Reactions     Doxycycline             Review of Systems:   Review of systems was negative other than HPI or as commented below.         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, and posterior vitreous detachment     Urinary tract infection, site not specified     Recurrent UTI's             Past Surgical History:      Past Surgical History:   Procedure Laterality Date     C LIGATE FALLOPIAN TUBE       COLONOSCOPY N/A 4/26/2016    Procedure: COLONOSCOPY;  Surgeon: Dontae Del Rio MD;  Location:  GI      COLONOSCOPY W BIOPSY  4/26/00      COLONOSCOPY W BIOPSY  10/8/03     HC COLONOSCOPY W BIOPSY  6/27/05    REPEAT IN 5 YEARS      CYSTOSCOPY,INSERT URETERAL STENT      Ureteral stent insertion      FLEX SIGMOIDOSCOPY W BIOPSY  5/30/00      LAPAROSCOPY, SURGICAL; CHOLECYSTECTOMY  1992      THYROIDECTOMY       LIGATION OF HEMORRHOID(S)      Hemorrhoidectomy     UPPER GI ENDOSCOPY,BIOPSY  10/01/01            Physical Examination:   BP  121/70   Pulse 76   Resp 20   Ht 1.829 m (6')   Wt 66.7 kg (147 lb)   LMP  (LMP Unknown)   SpO2 97%   BMI 19.94 kg/m     Constitutional:  Awake, alert, cooperative, in no apparent distress  Neurologic:  Alert, oriented, no focal neurological deficit observed  Psychological:  Euthymic; affect appropriate  Eyes: No icterus  Gait:  Normal    Aiyana Rogers MD   11/20/2019   [address], [address]  [place] [address] [phone]    Copy to: Emily Church

## 2019-11-20 NOTE — NURSING NOTE
Medical Assistant Note:  Tricia Sosa presents today for blood draw.    Patient seen by provider today: No.   present during visit today: Not Applicable.    Concerns: No Concerns.    Procedure:  Labs drawn    Post Assessment:  Labs drawn without difficulty: Yes.    Discharge Plan:  Departure Mode: Ambulatory.    Face to Face Time: 10 min.    Tricia Marshall CMA

## 2019-11-20 NOTE — PATIENT INSTRUCTIONS
"MY INFORMATION ON SLEEP APNEA-  Tricia Sosa    DOCTOR : Aiyana Rogers MD  SLEEP CENTER :      MY CONTACT NUMBER:   [facility]  [phone]  [facility]   [phone]  [facility]   [phone]      [facility]  [phone]  [facility]   [phone]      Eli Points:  1. What is Obstructive Sleep Apnea (MARIE)? MARIE is the most common type of sleep apnea. Apnea literally means, \"without breath.\" It is characterized by repetitive pauses in breathing, despite continued effort to breathe, and is usually associated with a reduction in blood oxygen saturation. Apneas can last 10 to over 60 seconds. It is caused by narrowing or collapse of the upper airway as muscles relax during sleep.   2. What are the consequences of MARIE? Symptoms include: daytime sleepiness- possibly increasing the risk of falling asleep while driving, unrefreshing/restless sleep, snoring, insomnia, waking frequently to urinate, waking with heartburn or reflux, reduced concentration and memory, and morning headaches. Other health consequences may include development of high blood pressure. Untreated MARIE also can contribute to heart disease, stroke and diabetes.   3. What are the treatment options? In most situations, sleep apnea is a lifelong disease that must be managed with daily therapy. Continuous Positive Airway (CPAP) is the most reliable treatment. A mouthguard to hold your jaw forward is usually the next most reliable option. Other options include postioning devices (to keep you off your back), nasal valves, tongue retaining device, weight loss, surgery. There is more detail about these options toward the end of this document.  4. What are the most important things to remember about using CPAP?     WHERE CAN I FIND MORE INFORMATION?    American Academy of Sleep Medicine Patient information on sleep " disorders:  http://yoursleep.aasmnet.org    CPAP -  WHY AND HOW?                 Continuous positive airway pressure, or CPAP, is the most effective treatment for obstructive sleep apnea. It works by blowing room air, through a mask, to hold your throat open. A decision to use CPAP is a major step forward in the pursuit of a healthier life. The successful use of CPAP will help you breathe easier, sleep better and live healthier. Using CPAP can be a positive experience if you keep these fernandez points in mind:  1. Commitment  CPAP is not a quick fix for your problem. It involves a long-term commitment to improve your sleep and your health.    2. Communication  Stay in close communication with both your sleep doctor and your CPAP supplier. Ask lots of questions and seek help when you need it.    3. Consistency  Use CPAP all night, every night and for every nap. You will receive the maximum health benefits from CPAP when you use it every time that you sleep. This will also make it easier for your body to adjust to the treatment.    4. Correction  The first machine and mask that you try may not be the best ones for you. Work with your sleep doctor and your CPAP supplier to make corrections to your equipment selection. Ask about trying a different type of machine or mask if you have ongoing problems. Make sure that your mask is a good fit and learn to use your equipment properly.    5. Challenge  Tell a family member or close friend to ask you each morning if you used your CPAP the previous night. Have someone to challenge you to give it your best effort.    6. Connection   Your adjustment to CPAP will be easier if you are able to connect with others who use the same treatment. Ask your sleep doctor if there is a support group in your area for people who have sleep apnea, or look for one on the Internet.  7. Comfort   Increase your level of comfort by using a saline spray, decongestant or heated humidifier if CPAP irritates  "your nose, mouth or throat. Use your unit's \"ramp\" setting to slowly get used to the air pressure level. There may be soft pads you can buy that will fit over your mask straps. Look on www.CPAP.com for accessories that can help make CPAP use more comfortable.  8. Cleaning   Clean your mask, tubing and headgear on a regular basis. Put this time in your schedule so that you don't forget to do it. Check and replace the filters for your CPAP unit and humidifier.    9. Completion   Although you are never finished with CPAP therapy, you should reward yourself by celebrating the completion of your first month of treatment. Expect this first month to be your hardest period of adjustment. It will involve some trial and error as you find the machine, mask and pressure settings that are right for you.    10. Continuation  After your first month of treatment, continue to make a daily commitment to use your CPAP all night, every night and for every nap.    CPAP-Tips to starting with success:  Begin using your CPAP for short periods of time during the day while you watch TV or read.    Use CPAP every night and for every nap. Using it less often reduces the health benefits and makes it harder for your body to get used to it.    Newer CPAP models are virtually silent; however, if you find the sound of your CPAP machine to be bothersome, place the unit under your bed to dampen the sound.     Make small adjustments to your mask, tubing, straps and headgear until you get the right fit. Tightening the mask may actually worsen the leak.  If it leaves significant marks on your face or irritates the bridge of your nose, it may not be the best mask for you.  Speak with the person who supplied the mask and consider trying other masks. Insurances will allow you to try different masks during the first month of starting CPAP.  Insurance also covers a new mask, hose and filter about every 6 months.    Use a saline nasal spray to ease mild nasal " congestion. Neti-Pot or saline nasal rinses may also help. Nasal gel sprays can help reduce nasal dryness.  Biotene mouthwash can be helpful to protect your teeth if you experience frequent dry mouth.  Dry mouth may be a sign of air escaping out of your mouth or out of the mask in the case of a full face mask.  Speak with your provider if you expect that is the case.     Take a nasal decongestant to relieve more severe nasal or sinus congestion.  Do not use Afrin (oxymetazoline) nasal spray more than 3 days in a row.  Speak with your sleep doctor if your nasal congestion is chronic.    Use a heated humidifier that fits your CPAP model to enhance your breathing comfort. Adjust the heat setting up if you get a dry nose or throat, down if you get condensation in the hose or mask.  Position the CPAP lower than you so that any condensation in the hose drains back into the machine rather than towards the mask.    Try a system that uses nasal pillows if traditional masks give you problems.    Clean your mask, tubing and headgear once a week. Make sure the equipment dries fully.    Regularly check and replace the filters for your CPAP unit and humidifier.    Work closely with your sleep provider and your CPAP supplier to make sure that you have the machine, mask and air pressure setting that works best for you. It is better to stop using it and call your provider to solve problems than to lay awake all night frustrated with the device.    BESIDES CPAP, WHAT OTHER THERAPIES ARE THERE?    Postioning devices if you only have the snoring or apnea while on your back    Dental devices if your condition is mild    Nasal valves may be effective though experience is limited    Weight loss if you are overweight    Surgery in limited cases where devices are not acceptable or there are problems with structures in the nose and throat  If treated with one of these alternative options, further evaluation is necessary to ensure that the  therapy is effective. This may require some form of repeat testing.      Healthy Lifestyle:  Healthy diet, exercise and limit alcohol: Not only will excessive alcohol increase your weight over time, but it irritates the throat tissues and make them swell, shrinking the airway and causing snoring. Drinking alcohol should be limited and stopped within 3-4 hours before going to bed.   Stop smoking: (Red swollen throat, heat, nicotine), also irritates and swells the airway, among numerous other negative health consequences.    Positioning Device  This example shows a pillow that straps around the waist. It may be appropriate for those whose sleep study shows milder sleep apnea that occurs primarily when lying flat on one's back. Preliminary studies have shown benefit but effectiveness at home should be verified.                      Oral Appliance  These are examples of two of many custom-made devices that are more likely to work in mild sleep apnea   Oral appliances are dental mouth pieces that fit very much like a sports mouth guards or removable orthodontic retainers. They are used to treat snoring and obstructive sleep apnea . The device prevents the airway from collapsing by either supporting the jaw in a forward position. Since oral appliances are non-invasive and easy to use, they may be considered as an early treatment option. Oral appliance therapy (OAT) involves the customization, selection, fabrication, fitting, adjustments and long-term follow-up care.  Custom made oral appliances are proven to be more effective than over-the-counter devices. Therefore, the over-the-counter devices are recommended not to be used as a screening tool nor as a therapeutic option.     Who gets a dental device?  Oral appliance therapy can be used as an alternative to CPAP therapy for the treatment of mild to moderate sleep apnea and for those patients who prefer OAT to CPAP. Oral appliance therapy is a first line therapy for the  treatment of primary snoring. Additionally, OAT is an option for those that cannot tolerate CPAP as therapy or who have experienced insufficient surgical results.                 Possible side effects?  Frequent but minor side effects include: excessive salivation, dry mouth, discomfort of teeth and jaw and temporary changes in the patient s bite.  Potential complications include: jaw pain, permanent occlusal changes and TMJ symptoms.  The above mentioned side effects and complications can be recognized and managed by dentists trained in dental sleep medicine.   Finding a dentist that practices dental sleep medicine  Specific training is available through the American Academy of Dental Sleep Medicine for dentists interested in working in the field of sleep. To find a dentist who is educated in the field of sleep and the use of oral appliances, near you, visit the Web site of the American Academy of Dental Sleep Medicine; also see http://www.accpstorage.org/newOrganization/patients/oralAppliances.pdf   To search for a dentist certified in these practices:  Http://aadsm.org/FindADentist.aspx?1  Nasal Valves              Nasal valves may be an option for mild apnea if other options are not well tolerated. The efficacy of these devices is generally less than CPAP or oral appliances.They may not be effective if you have frequent nasal congestion or have difficulty breathing through your nose.   Weight Loss:    Weight loss decreases severity of sleep apnea in most people with obesity. For those with mild obesity who have developed snoring with weight gain, even 15-30 pound weight loss can improve and occasionally eliminate sleep apnea.  Structured and life-long dietary and health habits are necessary to lose weight and keep healthier weight levels.     Though there are significant health benefits from weight loss, long-term weight loss is very difficult to achieve- studies show success with dietary management in less than  10% of people. In addition, substantial weight loss may require years of dietary control and may be difficult if patients have severe obesity. In these cases, surgical management may be considered.    If you are interested in methods for weight loss, you should review the options discussed at the National Institutes of Health patient information sites:     http://win.niddk.nih.gov/publications/index.htm  http:/www.health.nih.gov/topic/WeightLossDieting    Bariatric programs offer counseling in all methods of weight loss:    Http:/www.[facility].org/Specialties/WeightLossSurgeryandMedicalMgmt/htm    Your BMI is Body mass index is 19.94 kg/m .        Body mass index (BMI) is one way to tell whether you are at a healthy weight, overweight, or obese. It measures your weight in relation to your height.  A BMI of 18.5 to 24.9 is in the healthy range. A person with a BMI of 25 to 29.9 is considered overweight, and someone with a BMI of 30 or greater is considered obese.  Another way to find out if you are at risk for health problems caused by overweight and obesity is to measure your waist. If you are a woman and your waist is more than 35 inches, or if you are a man and your waist is more than 40 inches, your risk of disease may be higher.  More than two-thirds of American adults are considered overweight or obese. Being overweight or obese increases the risk for further weight gain.  Excess weight may lead to heart disease and diabetes. Creating and following plans for healthy eating and physical activity may help you improve your health.    Methods for maintaining or losing weight.    Weight control is part of healthy lifestyle and includes exercise, emotional health, and healthy eating habits.  Careful eating habits lifelong is the mainstay of weight control.  Though there are significant health benefits from weight loss, long-term weight loss with diet alone may be very difficult to achieve- studies show  "long-term success with dietary management in less than 10% of people. Attaining a healthy weight may be especially difficult to achieve in those with severe obesity. In some cases, medications, devices and surgical management might be considered.    What can you do?    If you are overweight or obese and are interested in methods for weight loss, you should discuss this with your provider. In addition, we recommend that you review healthy life styles and methods for weight loss available through the National Institutes of Health patient information sites:     http://win.niddk.nih.gov/publications/index.htm      Surgery:  There are a number of surgeries that have been attempted to treat apnea. In general, surgical options are usually reserved for cases in which there is a physical abnormality contributing to obstruction or other treatment options are ineffective or not tolerated. Most surgical options are either unreliable or quite invasive. One of the more common procedures is:  Uvulopalatopharyngoplasty: In this procedure, the uvula (the finger-like tissue that hangs in the back of the throat), part of the soft palate (the tissue that the uvula is attached to), and sometimes the tonsils or adenoids are removed. The efficacy of this surgery is around 30-50% .  After surgery, complications may include:  Sleepiness and sleep apnea related to post-surgery medication   Swelling, infection and bleeding   A sore throat and/or difficulty swallowing   Drainage of secretions into the nose and a nasal quality to the voice. English language speech does not seem to be affected by this surgery.   Narrowing of the airway in the nose and throat (hence constricting breathing) snoring and even iatrogenically caused sleep apnea. By cutting the tissues, excess scar tissue can \"tighten\" the airway and make it even smaller than it was before UPPP.  Patients who have had the uvula removed will become unable to correctly speak Slovak or " any other language that has a uvular 'r' phoneme.    Surgeries to help resolve nasal congestion may help reduce the severity of apnea slightly. Nasal congestion does not cause apnea on its own, so these surgeries are usually not performed just for MARIE.  They may be worth considering if the nasal congestion is significantly bothersome independent of apnea.

## 2019-11-20 NOTE — PROGRESS NOTES
S-(situation): Follow-up potassium results.    B-(background): Hypokalemia, CVID. Iron infusions. Next scheduled Iron 12/9/2019.    A-(assessment): RNCC contacted PT her potassium level within normal range at 3.7.     R-(recommendations): Pt instructed to continue potassium supplement until after follow-up visit with Dr. Summers 12/9/2019 to review and determine plan for potassium supplementation in the future. Pt agrees with plan. Thanked this RNCC for the update with results.    Asha Shields, BSN, RN, OCN  RN Cancer Care Coordinator  [facility]  [phone]

## 2019-11-20 NOTE — LETTER
11/20/2019         RE: Tricia Sosa  [address]  [place] [address]        Dear Colleague,    Thank you for referring your patient, Tricia Sosa, to the [facility]. Please see a copy of my visit note below.    See nursing note.    Tricia Marshall CMA on 11/20/2019 at 10:45 AM      Again, thank you for allowing me to participate in the care of your patient.        Sincerely,        [facility] Oncology Nurse

## 2019-11-22 DIAGNOSIS — E03.9 HYPOTHYROIDISM, UNSPECIFIED TYPE: ICD-10-CM

## 2019-11-22 RX ORDER — LEVOTHYROXINE SODIUM 150 UG/1
TABLET ORAL
Qty: 90 TABLET | Refills: 0 | Status: SHIPPED | OUTPATIENT
Start: 2019-11-22 | End: 2020-02-12

## 2019-11-22 NOTE — LETTER
[place] [address]  [place] [facility] [address]  [phone]        February 27, 2020    Tricia Sosa  [address]  [place] [address]              Dear Tricia Sosa    This is to remind you that your NON-FASTING LAB is due.    You may call our office at [phone] to schedule an appointment.    Please disregard this notice if you have already had your labs drawn or made an appointment.        Sincerely,        Emily Church MD

## 2019-11-22 NOTE — TELEPHONE ENCOUNTER
Levothyroxine refill request.     Last OV 10/31/19    TSH   Date Value Ref Range Status   10/16/2018 2.81 0.40 - 4.00 mU/L Final       Provider approval needed.

## 2019-12-09 ENCOUNTER — INFUSION THERAPY VISIT (OUTPATIENT)
Dept: INFUSION THERAPY | Facility: CLINIC | Age: 79
End: 2019-12-09
Attending: INTERNAL MEDICINE
Payer: MEDICARE

## 2019-12-09 ENCOUNTER — ONCOLOGY VISIT (OUTPATIENT)
Dept: ONCOLOGY | Facility: CLINIC | Age: 79
End: 2019-12-09
Attending: INTERNAL MEDICINE
Payer: MEDICARE

## 2019-12-09 ENCOUNTER — HOSPITAL ENCOUNTER (OUTPATIENT)
Facility: CLINIC | Age: 79
Setting detail: SPECIMEN
Discharge: HOME | End: 2019-12-09
Attending: INTERNAL MEDICINE | Admitting: INTERNAL MEDICINE
Payer: MEDICARE

## 2019-12-09 ENCOUNTER — HOSPITAL ENCOUNTER (OUTPATIENT)
Facility: CLINIC | Age: 79
Setting detail: SPECIMEN
End: 2019-12-09
Attending: INTERNAL MEDICINE
Payer: MEDICARE

## 2019-12-09 VITALS
RESPIRATION RATE: 20 BRPM | BODY MASS INDEX: 21.05 KG/M2 | DIASTOLIC BLOOD PRESSURE: 67 MMHG | TEMPERATURE: 98.2 F | WEIGHT: 155.2 LBS | OXYGEN SATURATION: 97 % | SYSTOLIC BLOOD PRESSURE: 121 MMHG | HEART RATE: 77 BPM

## 2019-12-09 DIAGNOSIS — D59.19 OTHER AUTOIMMUNE HEMOLYTIC ANEMIAS: ICD-10-CM

## 2019-12-09 DIAGNOSIS — D83.9 CVID (COMMON VARIABLE IMMUNODEFICIENCY) (H): ICD-10-CM

## 2019-12-09 DIAGNOSIS — E87.6 HYPOKALEMIA: ICD-10-CM

## 2019-12-09 DIAGNOSIS — E53.8 B12 DEFICIENCY: ICD-10-CM

## 2019-12-09 DIAGNOSIS — D83.9 CVID (COMMON VARIABLE IMMUNODEFICIENCY) (H): Primary | ICD-10-CM

## 2019-12-09 LAB
ALBUMIN SERPL-MCNC: 3.8 G/DL (ref 3.4–5)
ALP SERPL-CCNC: 89 U/L (ref 40–150)
ALT SERPL W P-5'-P-CCNC: 32 U/L (ref 0–50)
ANION GAP SERPL CALCULATED.3IONS-SCNC: 7 MMOL/L (ref 3–14)
AST SERPL W P-5'-P-CCNC: 38 U/L (ref 0–45)
BASOPHILS # BLD AUTO: 0 10E9/L (ref 0–0.2)
BASOPHILS NFR BLD AUTO: 0.2 %
BILIRUB SERPL-MCNC: 0.5 MG/DL (ref 0.2–1.3)
BUN SERPL-MCNC: 14 MG/DL (ref 7–30)
CALCIUM SERPL-MCNC: 8.8 MG/DL (ref 8.5–10.1)
CHLORIDE SERPL-SCNC: 107 MMOL/L (ref 94–109)
CO2 SERPL-SCNC: 26 MMOL/L (ref 20–32)
CREAT SERPL-MCNC: 0.7 MG/DL (ref 0.52–1.04)
DIFFERENTIAL METHOD BLD: ABNORMAL
EOSINOPHIL # BLD AUTO: 0 10E9/L (ref 0–0.7)
EOSINOPHIL NFR BLD AUTO: 0.5 %
ERYTHROCYTE [DISTWIDTH] IN BLOOD BY AUTOMATED COUNT: 15.3 % (ref 10–15)
GFR SERPL CREATININE-BSD FRML MDRD: 82 ML/MIN/{1.73_M2}
GLUCOSE SERPL-MCNC: 124 MG/DL (ref 70–99)
HAPTOGLOB SERPL-MCNC: <6 MG/DL (ref 35–175)
HCT VFR BLD AUTO: 36.8 % (ref 35–47)
HGB BLD-MCNC: 11.1 G/DL (ref 11.7–15.7)
IGA SERPL-MCNC: <7 MG/DL (ref 70–380)
IGG SERPL-MCNC: 706 MG/DL (ref 695–1620)
IGM SERPL-MCNC: 28 MG/DL (ref 60–265)
IMM GRANULOCYTES # BLD: 0 10E9/L (ref 0–0.4)
IMM GRANULOCYTES NFR BLD: 0.3 %
LDH SERPL L TO P-CCNC: 258 U/L (ref 81–234)
LYMPHOCYTES # BLD AUTO: 1.5 10E9/L (ref 0.8–5.3)
LYMPHOCYTES NFR BLD AUTO: 26 %
MCH RBC QN AUTO: 25.8 PG (ref 26.5–33)
MCHC RBC AUTO-ENTMCNC: 30.2 G/DL (ref 31.5–36.5)
MCV RBC AUTO: 85 FL (ref 78–100)
MONOCYTES # BLD AUTO: 0.7 10E9/L (ref 0–1.3)
MONOCYTES NFR BLD AUTO: 12.2 %
NEUTROPHILS # BLD AUTO: 3.6 10E9/L (ref 1.6–8.3)
NEUTROPHILS NFR BLD AUTO: 60.8 %
NRBC # BLD AUTO: 0 10*3/UL
NRBC BLD AUTO-RTO: 0 /100
PLATELET # BLD AUTO: 184 10E9/L (ref 150–450)
POTASSIUM SERPL-SCNC: 3.7 MMOL/L (ref 3.4–5.3)
PROT SERPL-MCNC: 6.5 G/DL (ref 6.8–8.8)
RBC # BLD AUTO: 4.31 10E12/L (ref 3.8–5.2)
RETICS # AUTO: 51.7 10E9/L (ref 25–95)
RETICS/RBC NFR AUTO: 1.2 % (ref 0.5–2)
SODIUM SERPL-SCNC: 140 MMOL/L (ref 133–144)
VIT B12 SERPL-MCNC: 620 PG/ML (ref 193–986)
WBC # BLD AUTO: 5.9 10E9/L (ref 4–11)

## 2019-12-09 PROCEDURE — 83010 ASSAY OF HAPTOGLOBIN QUANT: CPT | Performed by: INTERNAL MEDICINE

## 2019-12-09 PROCEDURE — 80053 COMPREHEN METABOLIC PANEL: CPT | Performed by: INTERNAL MEDICINE

## 2019-12-09 PROCEDURE — 82607 VITAMIN B-12: CPT | Performed by: INTERNAL MEDICINE

## 2019-12-09 PROCEDURE — 82784 ASSAY IGA/IGD/IGG/IGM EACH: CPT | Performed by: INTERNAL MEDICINE

## 2019-12-09 PROCEDURE — 99213 OFFICE O/P EST LOW 20 MIN: CPT | Performed by: INTERNAL MEDICINE

## 2019-12-09 PROCEDURE — 83615 LACTATE (LD) (LDH) ENZYME: CPT | Performed by: INTERNAL MEDICINE

## 2019-12-09 PROCEDURE — 96375 TX/PRO/DX INJ NEW DRUG ADDON: CPT

## 2019-12-09 PROCEDURE — 25000128 H RX IP 250 OP 636: Performed by: INTERNAL MEDICINE

## 2019-12-09 PROCEDURE — G0463 HOSPITAL OUTPT CLINIC VISIT: HCPCS | Mod: 25

## 2019-12-09 PROCEDURE — 96365 THER/PROPH/DIAG IV INF INIT: CPT

## 2019-12-09 PROCEDURE — 85025 COMPLETE CBC W/AUTO DIFF WBC: CPT | Performed by: INTERNAL MEDICINE

## 2019-12-09 PROCEDURE — 85045 AUTOMATED RETICULOCYTE COUNT: CPT | Performed by: INTERNAL MEDICINE

## 2019-12-09 PROCEDURE — 96366 THER/PROPH/DIAG IV INF ADDON: CPT

## 2019-12-09 RX ORDER — DIPHENHYDRAMINE HCL 25 MG
50 CAPSULE ORAL ONCE
Status: CANCELLED | OUTPATIENT
Start: 2019-12-09

## 2019-12-09 RX ORDER — ACETAMINOPHEN 325 MG/1
650 TABLET ORAL ONCE
Status: CANCELLED
Start: 2019-12-09

## 2019-12-09 RX ORDER — PREDNISONE 5 MG/1
TABLET ORAL
Qty: 180 TABLET | Refills: 0 | Status: SHIPPED | OUTPATIENT
Start: 2019-12-09 | End: 2020-06-24

## 2019-12-09 RX ORDER — HEPARIN SODIUM (PORCINE) LOCK FLUSH IV SOLN 100 UNIT/ML 100 UNIT/ML
5 SOLUTION INTRAVENOUS
Status: CANCELLED | OUTPATIENT
Start: 2019-12-09

## 2019-12-09 RX ORDER — HEPARIN SODIUM,PORCINE 10 UNIT/ML
5 VIAL (ML) INTRAVENOUS
Status: CANCELLED | OUTPATIENT
Start: 2019-12-09

## 2019-12-09 RX ADMIN — HUMAN IMMUNOGLOBULIN G 35 G: 20 LIQUID INTRAVENOUS at 11:40

## 2019-12-09 RX ADMIN — HYDROCORTISONE SODIUM SUCCINATE 100 MG: 100 INJECTION, POWDER, FOR SOLUTION INTRAMUSCULAR; INTRAVENOUS at 11:24

## 2019-12-09 ASSESSMENT — PAIN SCALES - GENERAL: PAINLEVEL: NO PAIN (0)

## 2019-12-09 NOTE — PROGRESS NOTES
Nursing Note:  Tricia Sosa presents today for Labs, IV start.    Patient seen by provider today: Yes: Dr Summers, following labs   present during visit today: Not Applicable.    Note: N/A.    Intravenous Access:  Labs drawn without difficulty.  Peripheral IV placed.    Discharge Plan:   Patient was sent to [facility] to wait for MD appointment.    Fadia Brown RN

## 2019-12-09 NOTE — NURSING NOTE
Oncology Rooming Note    December 9, 2019 10:38 AM   Tricia Sosa is a 79 year old female who presents for:    Chief Complaint   Patient presents with     Oncology Clinic Visit     common variable immunodeficiency     Initial Vitals: /67   Pulse 77   Temp 98.2  F (36.8  C) (Oral)   Resp 20   Wt 70.4 kg (155 lb 3.2 oz)   LMP  (LMP Unknown)   SpO2 97%   BMI 21.05 kg/m   Estimated body mass index is 21.05 kg/m  as calculated from the following:    Height as of 11/20/19: 1.829 m (6').    Weight as of this encounter: 70.4 kg (155 lb 3.2 oz). Body surface area is 1.89 meters squared.  No Pain (0) Comment: Data Unavailable   No LMP recorded (lmp unknown). Patient is postmenopausal.  Allergies reviewed: Yes  Medications reviewed: Yes    Medications: Medication refills not needed today.  Pharmacy name entered into Crisp:    [place], [place] - [address] [id number] [place], [place] - [address]    Clinical concerns:  Doctor was notified.      Rubi Daniels, MOHSEN

## 2019-12-09 NOTE — PROGRESS NOTES
[facility] CANCER CLINIC  FOLLOW-UP VISIT NOTE    PATIENT NAME: Tricia Sosa MRN # [id number]  DATE OF VISIT: Dec 9, 2019 YOB: 1940    REFERRING PROVIDER: No referring provider defined for this encounter.    DIAGNOSIS: Hemolytic anemia-autoimmune; IgA deficiency    TREATMENT SUMMARY:  Tricia has been diagnosed with IgA deficiency since her around 2000.  She was very sick at the time and had severe diarrhea.  She lost about 40 pounds in weight.  The workup revealed that she had markedly diminished CD4 counts of 48 and was diagnosed with CMV colitis.  Since then she has been followed in hematology clinic at [place] until recently.  She was noted to have anemia which was fairly long-standing.  She was initially referred for anemia in 2004 and also had a bone marrow biopsy done at that time which revealed hypercellular bone marrow with 70-80% cellularity and normal trilineage hematopoiesis and no morphologic features of MDS or lymphoproliferation.  Her anemia was attributed to her chronic underlying disease.  At the next evaluation in 2002 on 4 aggressive anemia there was no evidence of hemolysis, iron deficiency, B12 or folate deficiency.  Bone marrow biopsy was not pursued.  In summer of 2015 she had progressive fatigue, dyspnea on exertion and worsening anemia with a hemoglobin now of 9.  Workup at this time did suggest a hemolytic anemia with elevated LDH at 709, undetectable haptoglobin and elevated unconjugated bilirubin at 3.3.  Monospecific MARIKA was positive for both IgG and complement.  Cold agglutinin screen was positive with very low titer 1:64.  She was started on prednisone 60 mg daily with supplementation of iron folate and B12 starting 7/31/15.  Her prednisone has been slowly tapered and was discontinued off in January 2016.  She was last followed at [facility] on March 10 when she had stable hemoglobin.    Chemotherapy 8/28/2019 9/4/2019 9/12/2019 9/19/2019   Day,  Cycle Day 1, Cycle 1 Day 8, Cycle 1 Day 15, Cycle 1 Day 22, Cycle 1   riTUXimab (RITUXAN)  375 mg/m2 375 mg/m2 375 mg/m2 375 mg/m2     SUBJECTIVE   Tricia comes alone for this clinic visit for her hemolytic anemia.    Tricia comes in for a scheduled follow up. She has complete 4 weekly doses of rituximab on 9/19/19. She continued to have marked anemia and fatigue. I started her on prednisone at 60 mg with plans for a slow taper. She comes in for a follow up visit.     She notes that she had burst of energy with prednisone. She still has a hard time sleeping and wakes up at 2 am and 4 am. She keeps working around the house after she wakes up. She denies any SOB unless she does moderate exertion.     Wt Readings from Last 10 Encounters:   12/09/19 70.4 kg (155 lb 3.2 oz)   11/20/19 66.7 kg (147 lb)   11/11/19 66.9 kg (147 lb 8 oz)   10/31/19 66.3 kg (146 lb 1.6 oz)   10/14/19 65.3 kg (144 lb)   10/02/19 65.5 kg (144 lb 6.4 oz)   09/20/19 66.7 kg (147 lb)   09/12/19 66.5 kg (146 lb 9.6 oz)   09/11/19 65.8 kg (145 lb)   09/04/19 65.3 kg (144 lb)       PAST MEDICAL HISTORY   1. Common variable immunodeficiency syndrome  2. 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 Selective IgA deficiency  4. Leukopenia with markedly low CD4 counts on Bactrim prophylaxis  5. History of fall with subdural hematoma in 6/11/14 with complete resolution  6. Hashimoto's disease status post partial thyroidectomy      CURRENT OUTPATIENT MEDICATIONS     Current Outpatient Medications   Medication Sig     cyanocobalamin (VITAMIN  B-12) 1000 MCG tablet      folic acid (FOLVITE) 1 MG tablet      ketoconazole (NIZORAL) 2 % external shampoo Use every other day to scalp as needed     levothyroxine (SYNTHROID/LEVOTHROID) 150 MCG tablet TAKE ONE TABLET BY MOUTH ONE TIME DAILY     potassium chloride ER (K-DUR/KLOR-CON M) 20 MEQ CR tablet Take 1 tablet (20 mEq) by mouth daily     predniSONE (DELTASONE) 10 MG  tablet Take 4 tablets (40 mg) by mouth daily for 14 days, THEN 3 tablets (30 mg) daily for 30 days, THEN 2 tablets (20 mg) daily.     sulfamethoxazole-trimethoprim (BACTRIM DS/SEPTRA DS) 800-160 MG tablet Take 1 pill orally on Mon, Wed and Friday     UNABLE TO FIND privigen inj monthly     No current facility-administered medications for this visit.         ALLERGIES     Allergies   Allergen Reactions     Doxycycline         REVIEW OF SYSTEMS   As above in the HPI, o/w complete 12-point ROS was negative.     PHYSICAL EXAM   /67   Pulse 77   Temp 98.2  F (36.8  C) (Oral)   Resp 20   Wt 70.4 kg (155 lb 3.2 oz)   LMP  (LMP Unknown)   SpO2 97%   BMI 21.05 kg/m      SpO2 Readings from Last 4 Encounters:   11/13/18 99%   10/16/18 99%   09/27/18 98%   09/19/18 100%     Wt Readings from Last 3 Encounters:   12/09/19 70.4 kg (155 lb 3.2 oz)   11/20/19 66.7 kg (147 lb)   11/11/19 66.9 kg (147 lb 8 oz)     GEN: NAD  HEENT: PERRL, EOMI, no icterus, injection or pallor. Oropharynx is clear.  NECK: no cervical or supraclavicular lymphadenopathy  LUNGS: clear bilaterally  CV: regular, no murmurs, rubs, or gallops  ABDOMEN: soft, non-tender, non-distended, normal bowel sounds, no hepatosplenomegaly by percussion or palpation  EXT: warm, well perfused, no edema  NEURO: alert  SKIN: no rashes     LABORATORY AND IMAGING STUDIES     Recent Labs   Lab Test 12/09/19  0950 11/11/19  1025 10/14/19  1208 09/19/19  0940 08/23/19  0954    139 134 141 140   POTASSIUM 3.7 3.3* 3.6 4.0 4.2   CHLORIDE 107 104 102 107 109   CO2 26 27 29 27 28   ANIONGAP 7 8 3 7 3   BUN 14 17 18 13 14   CR 0.70 0.59 0.58 0.77 0.68   * 145* 174* 86 89   CULLEN 8.8 8.5 8.3* 8.0* 8.1*     No results for input(s): MAG, PHOS in the last 83814 hours.  Recent Labs   Lab Test 12/09/19  0950 11/11/19  1025 10/14/19  1208 09/19/19  0940 09/12/19  0938   WBC 5.9 4.1 7.2 4.0 4.9   HGB 11.1* 10.3* 9.9* 8.0* 8.3*    158 131* 54* 54*   MCV 85 87 93  107* 108*   NEUTROPHIL 60.8 61.4 87.3 73.0 54.0     Recent Labs   Lab Test 12/09/19  0950 11/11/19  1025 10/14/19  1208   BILITOTAL 0.5 0.6 0.7   ALKPHOS 89 74 89   ALT 32 28 34   AST 38 31 33   ALBUMIN 3.8 3.7 3.9   * 240* 286*     TSH   Date Value Ref Range Status   10/16/2018 2.81 0.40 - 4.00 mU/L Final   10/20/2017 1.90 0.40 - 4.00 mU/L Final   06/19/2017 1.82 0.40 - 4.00 mU/L Final        ASSESSMENT AND PLAN   1. 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 Common variable immunodeficiency disorder  4. Splenomegaly    Tricia comes in for a scheduled follow up visit. She is doing much better with her prednisone. She had burst of energy and denied any SOB. I started her on 60 mg po daily and then transitioned to 40 mg in 2 weeks.      I will do slow taper for her from here on. I will continue on 40 mg for 2 more weeks - total of 4 weeks; then bring her down to 30 mg and 20 mg for a month each. We will be slower in our taper after that.      I will have to start her on bactrim given the persistent prednisone use to prevent infection with pneumocystis.     We will continue with her monthly IV Ig that she has been doing all along.     I will see her in 3 months with labs prior to visit.      Jose Summers  Adj ,  Division of Hematology, Oncology & Transplantation  [facility].

## 2019-12-09 NOTE — LETTER
12/9/2019         RE: Tricia Sosa  [address]  [place] [address]        Dear Colleague,    Thank you for referring your patient, Tricia Sosa, to the [facility] CANCER CLINIC. Please see a copy of my visit note below.    [facility] CANCER CLINIC  FOLLOW-UP VISIT NOTE    PATIENT NAME: Tricia Sosa MRN # [id number]  DATE OF VISIT: Dec 9, 2019 YOB: 1940    REFERRING PROVIDER: No referring provider defined for this encounter.    DIAGNOSIS: Hemolytic anemia-autoimmune; IgA deficiency    TREATMENT SUMMARY:  Tricia has been diagnosed with IgA deficiency since her around 2000.  She was very sick at the time and had severe diarrhea.  She lost about 40 pounds in weight.  The workup revealed that she had markedly diminished CD4 counts of 48 and was diagnosed with CMV colitis.  Since then she has been followed in hematology clinic at [place] until recently.  She was noted to have anemia which was fairly long-standing.  She was initially referred for anemia in 2004 and also had a bone marrow biopsy done at that time which revealed hypercellular bone marrow with 70-80% cellularity and normal trilineage hematopoiesis and no morphologic features of MDS or lymphoproliferation.  Her anemia was attributed to her chronic underlying disease.  At the next evaluation in 2002 on 4 aggressive anemia there was no evidence of hemolysis, iron deficiency, B12 or folate deficiency.  Bone marrow biopsy was not pursued.  In summer of 2015 she had progressive fatigue, dyspnea on exertion and worsening anemia with a hemoglobin now of 9.  Workup at this time did suggest a hemolytic anemia with elevated LDH at 709, undetectable haptoglobin and elevated unconjugated bilirubin at 3.3.  Monospecific MARIKA was positive for both IgG and complement.  Cold agglutinin screen was positive with very low titer 1:64.  She was started on prednisone 60 mg daily with supplementation of iron folate and B12  starting 7/31/15.  Her prednisone has been slowly tapered and was discontinued off in January 2016.  She was last followed at [facility] on March 10 when she had stable hemoglobin.    Chemotherapy 8/28/2019 9/4/2019 9/12/2019 9/19/2019   Day, Cycle Day 1, Cycle 1 Day 8, Cycle 1 Day 15, Cycle 1 Day 22, Cycle 1   riTUXimab (RITUXAN)  375 mg/m2 375 mg/m2 375 mg/m2 375 mg/m2     SUBJECTIVE   Tricia comes alone for this clinic visit for her hemolytic anemia.    Tricia comes in for a scheduled follow up. She has complete 4 weekly doses of rituximab on 9/19/19. She continued to have marked anemia and fatigue. I started her on prednisone at 60 mg with plans for a slow taper. She comes in for a follow up visit.     She notes that she had burst of energy with prednisone. She still has a hard time sleeping and wakes up at 2 am and 4 am. She keeps working around the house after she wakes up. She denies any SOB unless she does moderate exertion.     Wt Readings from Last 10 Encounters:   12/09/19 70.4 kg (155 lb 3.2 oz)   11/20/19 66.7 kg (147 lb)   11/11/19 66.9 kg (147 lb 8 oz)   10/31/19 66.3 kg (146 lb 1.6 oz)   10/14/19 65.3 kg (144 lb)   10/02/19 65.5 kg (144 lb 6.4 oz)   09/20/19 66.7 kg (147 lb)   09/12/19 66.5 kg (146 lb 9.6 oz)   09/11/19 65.8 kg (145 lb)   09/04/19 65.3 kg (144 lb)       PAST MEDICAL HISTORY   1. Common variable immunodeficiency syndrome  2. 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 Selective IgA deficiency  4. Leukopenia with markedly low CD4 counts on Bactrim prophylaxis  5. History of fall with subdural hematoma in 6/11/14 with complete resolution  6. Hashimoto's disease status post partial thyroidectomy      CURRENT OUTPATIENT MEDICATIONS     Current Outpatient Medications   Medication Sig     cyanocobalamin (VITAMIN  B-12) 1000 MCG tablet      folic acid (FOLVITE) 1 MG tablet      ketoconazole (NIZORAL) 2 % external shampoo Use every other day to scalp  as needed     levothyroxine (SYNTHROID/LEVOTHROID) 150 MCG tablet TAKE ONE TABLET BY MOUTH ONE TIME DAILY     potassium chloride ER (K-DUR/KLOR-CON M) 20 MEQ CR tablet Take 1 tablet (20 mEq) by mouth daily     predniSONE (DELTASONE) 10 MG tablet Take 4 tablets (40 mg) by mouth daily for 14 days, THEN 3 tablets (30 mg) daily for 30 days, THEN 2 tablets (20 mg) daily.     sulfamethoxazole-trimethoprim (BACTRIM DS/SEPTRA DS) 800-160 MG tablet Take 1 pill orally on Mon, Wed and Friday     UNABLE TO FIND privigen inj monthly     No current facility-administered medications for this visit.         ALLERGIES     Allergies   Allergen Reactions     Doxycycline         REVIEW OF SYSTEMS   As above in the HPI, o/w complete 12-point ROS was negative.     PHYSICAL EXAM   /67   Pulse 77   Temp 98.2  F (36.8  C) (Oral)   Resp 20   Wt 70.4 kg (155 lb 3.2 oz)   LMP  (LMP Unknown)   SpO2 97%   BMI 21.05 kg/m       SpO2 Readings from Last 4 Encounters:   11/13/18 99%   10/16/18 99%   09/27/18 98%   09/19/18 100%     Wt Readings from Last 3 Encounters:   12/09/19 70.4 kg (155 lb 3.2 oz)   11/20/19 66.7 kg (147 lb)   11/11/19 66.9 kg (147 lb 8 oz)     GEN: NAD  HEENT: PERRL, EOMI, no icterus, injection or pallor. Oropharynx is clear.  NECK: no cervical or supraclavicular lymphadenopathy  LUNGS: clear bilaterally  CV: regular, no murmurs, rubs, or gallops  ABDOMEN: soft, non-tender, non-distended, normal bowel sounds, no hepatosplenomegaly by percussion or palpation  EXT: warm, well perfused, no edema  NEURO: alert  SKIN: no rashes     LABORATORY AND IMAGING STUDIES     Recent Labs   Lab Test 12/09/19  0950 11/11/19  1025 10/14/19  1208 09/19/19  0940 08/23/19  0954    139 134 141 140   POTASSIUM 3.7 3.3* 3.6 4.0 4.2   CHLORIDE 107 104 102 107 109   CO2 26 27 29 27 28   ANIONGAP 7 8 3 7 3   BUN 14 17 18 13 14   CR 0.70 0.59 0.58 0.77 0.68   * 145* 174* 86 89   CULLEN 8.8 8.5 8.3* 8.0* 8.1*     No results for  input(s): MAG, PHOS in the last 66978 hours.  Recent Labs   Lab Test 12/09/19  0950 11/11/19  1025 10/14/19  1208 09/19/19  0940 09/12/19  0938   WBC 5.9 4.1 7.2 4.0 4.9   HGB 11.1* 10.3* 9.9* 8.0* 8.3*    158 131* 54* 54*   MCV 85 87 93 107* 108*   NEUTROPHIL 60.8 61.4 87.3 73.0 54.0     Recent Labs   Lab Test 12/09/19  0950 11/11/19  1025 10/14/19  1208   BILITOTAL 0.5 0.6 0.7   ALKPHOS 89 74 89   ALT 32 28 34   AST 38 31 33   ALBUMIN 3.8 3.7 3.9   * 240* 286*     TSH   Date Value Ref Range Status   10/16/2018 2.81 0.40 - 4.00 mU/L Final   10/20/2017 1.90 0.40 - 4.00 mU/L Final   06/19/2017 1.82 0.40 - 4.00 mU/L Final        ASSESSMENT AND PLAN   1. 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 Common variable immunodeficiency disorder  4. Splenomegaly    Tricia comes in for a scheduled follow up visit. She is doing much better with her prednisone. She had burst of energy and denied any SOB. I started her on 60 mg po daily and then transitioned to 40 mg in 2 weeks.      I will do slow taper for her from here on. I will continue on 40 mg for 2 more weeks - total of 4 weeks; then bring her down to 30 mg and 20 mg for a month each. We will be slower in our taper after that.      I will have to start her on bactrim given the persistent prednisone use to prevent infection with pneumocystis.     We will continue with her monthly IV Ig that she has been doing all along.     I will see her in 3 months with labs prior to visit.      Jose Summers  Adj ,  Division of Hematology, Oncology & Transplantation  [facility].        Again, thank you for allowing me to participate in the care of your patient.        Sincerely,        Jose Summers MD

## 2019-12-09 NOTE — PROGRESS NOTES
Infusion Nursing Note:  Tricia Sosa presents today for IVIG.    Patient seen by provider today: Yes:    present during visit today: Not Applicable.    Note: Patient examined by MD today. IVIG started at rate of 0.5 ml/kg/hr and increased by 1 ml/kg/hr every 15 minutes with max of 4 ml/kg/hr.    Intravenous Access:  Labs drawn without difficulty.  Peripheral IV placed.    Treatment Conditions:  Biological Infusion Checklist:  ~~~ NOTE: If the patient answers yes to any of the questions below, hold the infusion and contact ordering provider or on-call provider.    1. Have you recently had an elevated temperature, fever, chills, productive cough, coughing for 3 weeks or longer or hemoptysis, abnormal vital signs, night sweats,  chest pain or have you noticed a decrease in your appetite, unexplained weight loss or fatigue? No  2. Do you have any open wounds or new incisions? No  3. Do you have any recent or upcoming hospitalizations, surgeries or dental procedures? No  4. Do you currently have or recently have had any signs of illness or infection or are you on any antibiotics? No  5. Have you had any new, sudden or worsening abdominal pain? No  6. Have you or anyone in your household received a live vaccination in the past 4 weeks? Please note:  No live vaccines while on biologic/chemotherapy until 6 months after the last treatment.  Patient can receive the flu vaccine (shot only) and the pneumovax.  It is optimal for the patient to get these vaccines mid cycle, but they can be given at any time as long as it is not on the day of the infusion. No  7. Have you recently been diagnosed with any new nervous system diseases (ie. Multiple sclerosis, Guillain [place], seizures, neurological changes) or cancer diagnosis? No  8. Are you on any form of radiation or chemotherapy? No  9. Are you pregnant or breast feeding or do you have plans of pregnancy in the future? No  10. Have you been having any signs of  worsening depression or suicidal ideations?  (benlysta only) No  11. Have there been any other new onset medical symptoms? No        Post Infusion Assessment:  Patient tolerated infusion without incident.  Blood return noted pre and post infusion.  Site patent and intact, free from redness, edema or discomfort.  No evidence of extravasations.  Access discontinued per protocol.       Discharge Plan:   Patient declined prescription refills.  Discharge instructions reviewed with: Patient.  Patient and/or family verbalized understanding of discharge instructions and all questions answered.  Copy of AVS reviewed with patient and/or family.    Patient discharged in stable condition accompanied by: self.  Departure Mode: Ambulatory.    Jocelyn Riggs RN

## 2020-01-14 ENCOUNTER — HOSPITAL ENCOUNTER (OUTPATIENT)
Facility: CLINIC | Age: 80
Setting detail: SPECIMEN
Discharge: HOME | End: 2020-01-14
Attending: INTERNAL MEDICINE | Admitting: INTERNAL MEDICINE
Payer: MEDICARE

## 2020-01-14 ENCOUNTER — ONCOLOGY VISIT (OUTPATIENT)
Dept: ONCOLOGY | Facility: CLINIC | Age: 80
End: 2020-01-14
Attending: INTERNAL MEDICINE
Payer: MEDICARE

## 2020-01-14 DIAGNOSIS — E53.8 B12 DEFICIENCY: ICD-10-CM

## 2020-01-14 DIAGNOSIS — D83.9 CVID (COMMON VARIABLE IMMUNODEFICIENCY) (H): ICD-10-CM

## 2020-01-14 DIAGNOSIS — D59.19 OTHER AUTOIMMUNE HEMOLYTIC ANEMIAS: ICD-10-CM

## 2020-01-14 LAB
ALBUMIN SERPL-MCNC: 4.1 G/DL (ref 3.4–5)
ALP SERPL-CCNC: 79 U/L (ref 40–150)
ALT SERPL W P-5'-P-CCNC: 27 U/L (ref 0–50)
ANION GAP SERPL CALCULATED.3IONS-SCNC: 5 MMOL/L (ref 3–14)
AST SERPL W P-5'-P-CCNC: 38 U/L (ref 0–45)
BASOPHILS # BLD AUTO: 0 10E9/L (ref 0–0.2)
BASOPHILS NFR BLD AUTO: 0.1 %
BILIRUB SERPL-MCNC: 0.7 MG/DL (ref 0.2–1.3)
BUN SERPL-MCNC: 16 MG/DL (ref 7–30)
CALCIUM SERPL-MCNC: 8.8 MG/DL (ref 8.5–10.1)
CHLORIDE SERPL-SCNC: 106 MMOL/L (ref 94–109)
CO2 SERPL-SCNC: 28 MMOL/L (ref 20–32)
CREAT SERPL-MCNC: 0.75 MG/DL (ref 0.52–1.04)
DIFFERENTIAL METHOD BLD: ABNORMAL
EOSINOPHIL # BLD AUTO: 0 10E9/L (ref 0–0.7)
EOSINOPHIL NFR BLD AUTO: 0.4 %
ERYTHROCYTE [DISTWIDTH] IN BLOOD BY AUTOMATED COUNT: 15.3 % (ref 10–15)
GFR SERPL CREATININE-BSD FRML MDRD: 76 ML/MIN/{1.73_M2}
GLUCOSE SERPL-MCNC: 90 MG/DL (ref 70–99)
HCT VFR BLD AUTO: 36.5 % (ref 35–47)
HGB BLD-MCNC: 11.2 G/DL (ref 11.7–15.7)
IMM GRANULOCYTES # BLD: 0 10E9/L (ref 0–0.4)
IMM GRANULOCYTES NFR BLD: 0.3 %
LDH SERPL L TO P-CCNC: 268 U/L (ref 81–234)
LYMPHOCYTES # BLD AUTO: 0.5 10E9/L (ref 0.8–5.3)
LYMPHOCYTES NFR BLD AUTO: 6.9 %
MCH RBC QN AUTO: 25.9 PG (ref 26.5–33)
MCHC RBC AUTO-ENTMCNC: 30.7 G/DL (ref 31.5–36.5)
MCV RBC AUTO: 85 FL (ref 78–100)
MONOCYTES # BLD AUTO: 0.4 10E9/L (ref 0–1.3)
MONOCYTES NFR BLD AUTO: 5 %
NEUTROPHILS # BLD AUTO: 6.3 10E9/L (ref 1.6–8.3)
NEUTROPHILS NFR BLD AUTO: 87.3 %
NRBC # BLD AUTO: 0 10*3/UL
NRBC BLD AUTO-RTO: 0 /100
PLATELET # BLD AUTO: 175 10E9/L (ref 150–450)
POTASSIUM SERPL-SCNC: 4 MMOL/L (ref 3.4–5.3)
PROT SERPL-MCNC: 6.8 G/DL (ref 6.8–8.8)
RBC # BLD AUTO: 4.32 10E12/L (ref 3.8–5.2)
RETICS # AUTO: 44.1 10E9/L (ref 25–95)
RETICS/RBC NFR AUTO: 1 % (ref 0.5–2)
SODIUM SERPL-SCNC: 139 MMOL/L (ref 133–144)
VIT B12 SERPL-MCNC: 714 PG/ML (ref 193–986)
WBC # BLD AUTO: 7.2 10E9/L (ref 4–11)

## 2020-01-14 PROCEDURE — 82607 VITAMIN B-12: CPT | Performed by: INTERNAL MEDICINE

## 2020-01-14 PROCEDURE — 36415 COLL VENOUS BLD VENIPUNCTURE: CPT

## 2020-01-14 PROCEDURE — 83010 ASSAY OF HAPTOGLOBIN QUANT: CPT | Performed by: INTERNAL MEDICINE

## 2020-01-14 PROCEDURE — 83615 LACTATE (LD) (LDH) ENZYME: CPT | Performed by: INTERNAL MEDICINE

## 2020-01-14 PROCEDURE — 82784 ASSAY IGA/IGD/IGG/IGM EACH: CPT | Performed by: INTERNAL MEDICINE

## 2020-01-14 PROCEDURE — 80053 COMPREHEN METABOLIC PANEL: CPT | Performed by: INTERNAL MEDICINE

## 2020-01-14 PROCEDURE — 85025 COMPLETE CBC W/AUTO DIFF WBC: CPT | Performed by: INTERNAL MEDICINE

## 2020-01-14 PROCEDURE — 85045 AUTOMATED RETICULOCYTE COUNT: CPT | Performed by: INTERNAL MEDICINE

## 2020-01-14 NOTE — PROGRESS NOTES
Medical Assistant Note:  Tricia Sosa presents today for blood draw.    Patient seen by provider today: No.   present during visit today: Not Applicable.    Concerns: No Concerns. Labs drawn today prior to infusion per Leroy Black - infusion .     Procedure:  Lab draw site: right antecub, Needle type: butterfly, Gauge: 21.    Post Assessment:  Labs drawn without difficulty: Yes.    Discharge Plan:  Departure Mode: Ambulatory.    Face to Face Time: 10.    Barbara Corrigan, CMA

## 2020-01-14 NOTE — LETTER
1/14/2020         RE: Tricia Sosa  [address]  [place] [address]        Dear Colleague,    Thank you for referring your patient, Tricia Sosa, to the [facility] CANCER [facility]. Please see a copy of my visit note below.    Medical Assistant Note:  Tricia Sosa presents today for blood draw.    Patient seen by provider today: No.   present during visit today: Not Applicable.    Concerns: No Concerns. Labs drawn today prior to infusion per Leroy Black - infusion .     Procedure:  Lab draw site: right antecub, Needle type: butterfly, Gauge: 21.    Post Assessment:  Labs drawn without difficulty: Yes.    Discharge Plan:  Departure Mode: Ambulatory.    Face to Face Time: 10.    Barbara Corrigan [facility]            Again, thank you for allowing me to participate in the care of your patient.        Sincerely,         ALIE Peters

## 2020-01-15 LAB
HAPTOGLOB SERPL-MCNC: <3 MG/DL (ref 32–197)
IGA SERPL-MCNC: <2 MG/DL (ref 84–499)
IGG SERPL-MCNC: 649 MG/DL (ref 610–1616)
IGM SERPL-MCNC: 33 MG/DL (ref 35–242)

## 2020-01-30 ENCOUNTER — HOSPITAL ENCOUNTER (OUTPATIENT)
Facility: CLINIC | Age: 80
Setting detail: SPECIMEN
Discharge: HOME | End: 2020-01-30
Attending: INTERNAL MEDICINE | Admitting: INTERNAL MEDICINE
Payer: MEDICARE

## 2020-01-30 DIAGNOSIS — D83.9 CVID (COMMON VARIABLE IMMUNODEFICIENCY) (H): ICD-10-CM

## 2020-01-30 DIAGNOSIS — E53.8 B12 DEFICIENCY: ICD-10-CM

## 2020-01-30 DIAGNOSIS — D59.19 OTHER AUTOIMMUNE HEMOLYTIC ANEMIAS: ICD-10-CM

## 2020-01-30 LAB
ALBUMIN SERPL-MCNC: 3.9 G/DL (ref 3.4–5)
ALP SERPL-CCNC: 84 U/L (ref 40–150)
ALT SERPL W P-5'-P-CCNC: 29 U/L (ref 0–50)
ANION GAP SERPL CALCULATED.3IONS-SCNC: 3 MMOL/L (ref 3–14)
AST SERPL W P-5'-P-CCNC: 25 U/L (ref 0–45)
BASOPHILS # BLD AUTO: 0 10E9/L (ref 0–0.2)
BASOPHILS NFR BLD AUTO: 0.3 %
BILIRUB SERPL-MCNC: 0.6 MG/DL (ref 0.2–1.3)
BUN SERPL-MCNC: 14 MG/DL (ref 7–30)
CALCIUM SERPL-MCNC: 8.7 MG/DL (ref 8.5–10.1)
CHLORIDE SERPL-SCNC: 106 MMOL/L (ref 94–109)
CO2 SERPL-SCNC: 31 MMOL/L (ref 20–32)
CREAT SERPL-MCNC: 0.71 MG/DL (ref 0.52–1.04)
DIFFERENTIAL METHOD BLD: ABNORMAL
EOSINOPHIL # BLD AUTO: 0.1 10E9/L (ref 0–0.7)
EOSINOPHIL NFR BLD AUTO: 0.9 %
ERYTHROCYTE [DISTWIDTH] IN BLOOD BY AUTOMATED COUNT: 15.4 % (ref 10–15)
GFR SERPL CREATININE-BSD FRML MDRD: 80 ML/MIN/{1.73_M2}
GLUCOSE SERPL-MCNC: 76 MG/DL (ref 70–99)
HCT VFR BLD AUTO: 36.5 % (ref 35–47)
HGB BLD-MCNC: 11 G/DL (ref 11.7–15.7)
IMM GRANULOCYTES # BLD: 0 10E9/L (ref 0–0.4)
IMM GRANULOCYTES NFR BLD: 0.4 %
LDH SERPL L TO P-CCNC: 275 U/L (ref 81–234)
LYMPHOCYTES # BLD AUTO: 1 10E9/L (ref 0.8–5.3)
LYMPHOCYTES NFR BLD AUTO: 12.8 %
MCH RBC QN AUTO: 25.7 PG (ref 26.5–33)
MCHC RBC AUTO-ENTMCNC: 30.1 G/DL (ref 31.5–36.5)
MCV RBC AUTO: 85 FL (ref 78–100)
MONOCYTES # BLD AUTO: 0.8 10E9/L (ref 0–1.3)
MONOCYTES NFR BLD AUTO: 10.4 %
NEUTROPHILS # BLD AUTO: 5.7 10E9/L (ref 1.6–8.3)
NEUTROPHILS NFR BLD AUTO: 75.2 %
NRBC # BLD AUTO: 0 10*3/UL
NRBC BLD AUTO-RTO: 0 /100
PLATELET # BLD AUTO: 183 10E9/L (ref 150–450)
POTASSIUM SERPL-SCNC: 4 MMOL/L (ref 3.4–5.3)
PROT SERPL-MCNC: 6.5 G/DL (ref 6.8–8.8)
RBC # BLD AUTO: 4.28 10E12/L (ref 3.8–5.2)
RETICS # AUTO: 65.5 10E9/L (ref 25–95)
RETICS/RBC NFR AUTO: 1.5 % (ref 0.5–2)
SODIUM SERPL-SCNC: 140 MMOL/L (ref 133–144)
VIT B12 SERPL-MCNC: 742 PG/ML (ref 193–986)
WBC # BLD AUTO: 7.6 10E9/L (ref 4–11)

## 2020-01-30 PROCEDURE — 82784 ASSAY IGA/IGD/IGG/IGM EACH: CPT | Performed by: INTERNAL MEDICINE

## 2020-01-30 PROCEDURE — 85045 AUTOMATED RETICULOCYTE COUNT: CPT | Performed by: INTERNAL MEDICINE

## 2020-01-30 PROCEDURE — 80053 COMPREHEN METABOLIC PANEL: CPT | Performed by: INTERNAL MEDICINE

## 2020-01-30 PROCEDURE — 36415 COLL VENOUS BLD VENIPUNCTURE: CPT

## 2020-01-30 PROCEDURE — 85025 COMPLETE CBC W/AUTO DIFF WBC: CPT | Performed by: INTERNAL MEDICINE

## 2020-01-30 PROCEDURE — 83010 ASSAY OF HAPTOGLOBIN QUANT: CPT | Performed by: INTERNAL MEDICINE

## 2020-01-30 PROCEDURE — 83615 LACTATE (LD) (LDH) ENZYME: CPT | Performed by: INTERNAL MEDICINE

## 2020-01-30 PROCEDURE — 82607 VITAMIN B-12: CPT | Performed by: INTERNAL MEDICINE

## 2020-01-31 ENCOUNTER — PATIENT OUTREACH (OUTPATIENT)
Dept: ONCOLOGY | Facility: CLINIC | Age: 80
End: 2020-01-31

## 2020-02-03 LAB
HAPTOGLOB SERPL-MCNC: <3 MG/DL (ref 32–197)
IGA SERPL-MCNC: <2 MG/DL (ref 84–499)
IGG SERPL-MCNC: 514 MG/DL (ref 610–1616)
IGM SERPL-MCNC: 27 MG/DL (ref 35–242)

## 2020-02-04 ENCOUNTER — PATIENT OUTREACH (OUTPATIENT)
Dept: ONCOLOGY | Facility: CLINIC | Age: 80
End: 2020-02-04

## 2020-02-04 NOTE — PROGRESS NOTES
Tricia called in to clinic asking if she can get rescheduled for her IVIG infusions now that her IgG level is < 600.     Results for TRICIA MARRUFO (MRN [id number]) as of 2/4/2020 12:53   Ref. Range 1/30/2020 10:24   IGG Latest Ref Range: 610 - 1,616 mg/dL 514 (L)     Writer will notify Dr. Summers and MAGGIE Barry. Tricia is requesting a call back on her home phone number:   [phone]    Anabell Hawkins RN, BSN, [facility]  Patient Care Coordinator  [facility]  [phone]

## 2020-02-11 DIAGNOSIS — E03.9 HYPOTHYROIDISM, UNSPECIFIED TYPE: ICD-10-CM

## 2020-02-12 RX ORDER — LEVOTHYROXINE SODIUM 150 UG/1
TABLET ORAL
Qty: 30 TABLET | Refills: 0 | Status: SHIPPED | OUTPATIENT
Start: 2020-02-12 | End: 2020-03-18

## 2020-02-12 NOTE — TELEPHONE ENCOUNTER
"Requested Prescriptions   Pending Prescriptions Disp Refills     levothyroxine (SYNTHROID/LEVOTHROID) 150 MCG tablet [Pharmacy Med Name: Levothyroxine Sodium Oral Tablet 150 MCG] 90 tablet 0     Sig: TAKE ONE TABLET BY MOUTH ONE TIME DAILY   Last Written Prescription Date:  11/22/2019  Last Fill Quantity: 90,  # refills: 0   Last office visit: 10/31/2019 with prescribing provider:     Future Office Visit:   Next 5 appointments (look out 90 days)    Mar 16, 2020 10:30 AM CDT  Return Visit with Jose Summers MD  [facility] ([facility]) [address]  [address]  [facility] [address]  [phone]           Thyroid Protocol Failed - 2/11/2020  6:15 PM        Failed - Normal TSH on file in past 12 months     Recent Labs   Lab Test 10/16/18  1036   TSH 2.81              Passed - Patient is 12 years or older        Passed - Recent (12 mo) or future (30 days) visit within the authorizing provider's specialty     Patient has had an office visit with the authorizing provider or a provider within the authorizing providers department within the previous 12 mos or has a future within next 30 days. See \"Patient Info\" tab in inbasket, or \"Choose Columns\" in Meds & Orders section of the refill encounter.              Passed - Medication is active on med list        Passed - No active pregnancy on record     If patient is pregnant or has had a positive pregnancy test, please check TSH.          Passed - No positive pregnancy test in past 12 months     If patient is pregnant or has had a positive pregnancy test, please check TSH.          "

## 2020-02-17 ENCOUNTER — INFUSION THERAPY VISIT (OUTPATIENT)
Dept: INFUSION THERAPY | Facility: CLINIC | Age: 80
End: 2020-02-17
Attending: INTERNAL MEDICINE
Payer: MEDICARE

## 2020-02-17 VITALS
DIASTOLIC BLOOD PRESSURE: 69 MMHG | SYSTOLIC BLOOD PRESSURE: 114 MMHG | WEIGHT: 161.8 LBS | OXYGEN SATURATION: 96 % | BODY MASS INDEX: 21.94 KG/M2 | HEART RATE: 80 BPM | RESPIRATION RATE: 16 BRPM | TEMPERATURE: 98.2 F

## 2020-02-17 DIAGNOSIS — D59.19 OTHER AUTOIMMUNE HEMOLYTIC ANEMIAS: ICD-10-CM

## 2020-02-17 DIAGNOSIS — D83.9 CVID (COMMON VARIABLE IMMUNODEFICIENCY) (H): Primary | ICD-10-CM

## 2020-02-17 PROCEDURE — 96365 THER/PROPH/DIAG IV INF INIT: CPT

## 2020-02-17 PROCEDURE — 96366 THER/PROPH/DIAG IV INF ADDON: CPT

## 2020-02-17 PROCEDURE — 96375 TX/PRO/DX INJ NEW DRUG ADDON: CPT

## 2020-02-17 PROCEDURE — 25000128 H RX IP 250 OP 636: Performed by: INTERNAL MEDICINE

## 2020-02-17 RX ORDER — HEPARIN SODIUM (PORCINE) LOCK FLUSH IV SOLN 100 UNIT/ML 100 UNIT/ML
5 SOLUTION INTRAVENOUS
Status: CANCELLED | OUTPATIENT
Start: 2020-02-17

## 2020-02-17 RX ORDER — ACETAMINOPHEN 325 MG/1
650 TABLET ORAL ONCE
Status: CANCELLED
Start: 2020-02-17

## 2020-02-17 RX ORDER — HEPARIN SODIUM,PORCINE 10 UNIT/ML
5 VIAL (ML) INTRAVENOUS
Status: CANCELLED | OUTPATIENT
Start: 2020-02-17

## 2020-02-17 RX ORDER — DIPHENHYDRAMINE HCL 25 MG
50 CAPSULE ORAL ONCE
Status: CANCELLED | OUTPATIENT
Start: 2020-02-17

## 2020-02-17 RX ADMIN — HYDROCORTISONE SODIUM SUCCINATE 100 MG: 100 INJECTION, POWDER, FOR SOLUTION INTRAMUSCULAR; INTRAVENOUS at 12:04

## 2020-02-17 RX ADMIN — HUMAN IMMUNOGLOBULIN G 35 G: 20 LIQUID INTRAVENOUS at 12:12

## 2020-02-17 ASSESSMENT — PAIN SCALES - GENERAL: PAINLEVEL: NO PAIN (0)

## 2020-02-17 NOTE — PROGRESS NOTES
Infusion Nursing Note:  Maandrew Sosa presents today for IVIG.    Patient seen by provider today: No   present during visit today: Not Applicable.    Note: IVIG started at 0.5ml/kg/hr and increased to 1ml/kg/hr after 15 minutes.  Remainder of infusion IVIG increased by 1ml/kg/hr every 15 minutes for a max of 4ml/kg/hr.    Intravenous Access:  Peripheral IV placed.    Treatment Conditions:  Biological Infusion Checklist:  ~~~ NOTE: If the patient answers yes to any of the questions below, hold the infusion and contact ordering provider or on-call provider.    1. Have you recently had an elevated temperature, fever, chills, productive cough, coughing for 3 weeks or longer or hemoptysis, abnormal vital signs, night sweats,  chest pain or have you noticed a decrease in your appetite, unexplained weight loss or fatigue? No  2. Do you have any open wounds or new incisions? No  3. Do you have any recent or upcoming hospitalizations, surgeries or dental procedures? No  4. Do you currently have or recently have had any signs of illness or infection or are you on any antibiotics? No  5. Have you had any new, sudden or worsening abdominal pain? No  6. Have you or anyone in your household received a live vaccination in the past 4 weeks? Please note:  No live vaccines while on biologic/chemotherapy until 6 months after the last treatment.  Patient can receive the flu vaccine (shot only) and the pneumovax.  It is optimal for the patient to get these vaccines mid cycle, but they can be given at any time as long as it is not on the day of the infusion. No  7. Have you recently been diagnosed with any new nervous system diseases (ie. Multiple sclerosis, Guillain Elizabeth, seizures, neurological changes) or cancer diagnosis? No  8. Are you on any form of radiation or chemotherapy? No  9. Are you pregnant or breast feeding or do you have plans of pregnancy in the future? No  10. Have you been having any signs of worsening  depression or suicidal ideations?  (benlysta only) No  11. Have there been any other new onset medical symptoms? No        Post Infusion Assessment:  Patient tolerated infusion without incident.  Blood return noted pre and post infusion.  Site patent and intact, free from redness, edema or discomfort.  No evidence of extravasations.  Access discontinued per protocol.  Biologic Infusion Post Education: Call the triage nurse at your clinic or seek medical attention if you have chills and/or temperature greater than or equal to 100.5, uncontrolled nausea/vomiting, diarrhea, constipation, dizziness, shortness of breath, chest pain, heart palpitations, weakness or any other new or concerning symptoms, questions or concerns.  You cannot have any live virus vaccines prior to or during treatment or up to 6 months post infusion.  If you have an upcoming surgery, medical procedure or dental procedure during treatment, this should be discussed with your ordering physician and your surgeon/dentist.  If you are having any concerning symptom, if you are unsure if you should get your next infusion or wish to speak to a provider before your next infusion, please call your care coordinator or triage nurse at your clinic to notify them so we can adequately serve you.       Discharge Plan:   Discharge instructions reviewed with: Patient.  Patient discharged in stable condition accompanied by: self.  Departure Mode: Ambulatory.  Next lab, MD and infusion scheduled for 3/16/2020.    STACY CHEATHAM RN

## 2020-02-18 ENCOUNTER — THERAPY VISIT (OUTPATIENT)
Dept: PHYSICAL THERAPY | Facility: CLINIC | Age: 80
End: 2020-02-18
Payer: MEDICARE

## 2020-02-18 DIAGNOSIS — M54.59 MECHANICAL LOW BACK PAIN: ICD-10-CM

## 2020-02-18 DIAGNOSIS — M62.81 GENERALIZED MUSCLE WEAKNESS: ICD-10-CM

## 2020-02-18 DIAGNOSIS — M54.6 PAIN IN THORACIC SPINE: ICD-10-CM

## 2020-02-18 PROCEDURE — 97110 THERAPEUTIC EXERCISES: CPT | Mod: GP | Performed by: PHYSICAL THERAPIST

## 2020-02-18 PROCEDURE — 97112 NEUROMUSCULAR REEDUCATION: CPT | Mod: GP | Performed by: PHYSICAL THERAPIST

## 2020-02-18 NOTE — PROGRESS NOTES
[facility] Initial Evaluation -- Lumbar    Date: February 18, 2020  Tricia Sosa is a 79 year old female with a lumbar condition.   Referral: Dr. Yeo  Work mechanical stresses:  retired  Employment status:  retired  Leisure mechanical stresses: retired  Functional disability score (MARCO/STarT Back):  See flow sheet  VAS score (0-10): 0/10  Patient goals/expectations:  Strengthening/postural help    HISTORY:    Present symptoms: some mid back pain/stiffness  Pain quality (sharp/shooting/stabbing/aching/burning/cramping):  aching   Paresthesia (yes/no):  no    Present since (onset date): August 2019 (date of physician visit/referral to PT).     Symptoms (improving/unchanging/worsening):  improving.     Symptoms commenced as a result of: no apparent reason--does have hemolytic anemia   Condition occurred in the following environment:   n/a     Symptoms at onset (back/thigh/leg): back  Constant symptoms (back/thigh/leg):   Intermittent symptoms (back/thigh/leg): back    Symptoms are made worse with the following: Always Standing, Time of day - Always as the day progresses and Other - Lifting   Symptoms are made better with the following: Always Walking, Time of day - Sometimes AM and Other - rest    Disturbed sleep (yes/no):  no Sleeping postures (prone/sup/side R/L): sides/back    Previous episodes (0/1-5/6-10/11+): 1 Year of first episode: 2019    Previous history: has had ongoing weakness since 2004, diagnosed 2015 with official diagnosis  Previous treatments: had attempted PT in November 2019 but has hemolytic anemia and could not tolerate any additional exercise.      Specific Questions:  Cough/Sneeze/Strain (pos/neg): neg  Bowel/Bladder (normal/abnormal): normal  Gait (normal/abnormal): normal  Medications (nil/NSAIDS/analg/steroids/anticoag/other):  Steroids and Other - Thyroid  Medical allergies:  See medical history  General health (excellent/good/fair/poor):  Fair to good  Pertinent  "medical history:  Anemia, Thyroid problems and CVID  Imaging (None/Xray/MRI/Other):  none  Recent or major surgery (yes/no):  no  Night pain (yes/no): no  Accidents (yes/no): no  Unexplained weight loss (yes/no): none  Barriers at home: none  Other red flags: none    EXAMINATION    Posture:   Sitting (good/fair/poor): fair  Standing (good/fair/poor):fair  Lordosis (red/acc/normal): red  Correction of posture (better/worse/no effect): produced some decreased h    Lateral Shift (right/left/nil): nil  Relevant (yes/no):  no  Other Observations: poor posture,     Neurological:    Motor deficit:    Reflexes:    Sensory deficit:    Dural signs:      Movement Loss:   Luis Mod Min Nil Pain   Flexion  x x     Extension  x x     Side Gliding R  x x     Side Gliding L  x x       Test Movements:   During: produces, abolishes, increases, decreases, no effect, centralizing, peripheralizing   After: better, worse, no better, no worse, no effect, centralized, peripheralized    Pretest symptoms standing:    Symptoms During Symptoms After ROM increased ROM decreased No Effect   FIS        Rep FIS        EIS        Rep EIS        Pretest symptoms lying:     Symptoms During Symptoms After ROM increased ROM decreased No Effect   SALLY        Rep SALLY        EIL        Rep EIL        If required, pretest symptoms:    Symptoms During Symptoms After ROM increased ROM decreased No Effect   SGIS - R        Rep SGIS - R        SGIS - L        Rep SGIS - L          Static Tests:  Sitting slouched:    Sitting erect:    Standing slouched   Standing erect:    Lying prone in extension:   Long sitting:      Other Tests: Thoracic ROM: Ext=mod loss B ROT=mod loss: repeated Thx EXT in sitting: P \"stretch\"/NW/incr ROM; difficulty maintaining upright posture due to fatigue; scap stabs grossly 3/5; poor TA activation/core strength.     Provisional Classification:  Inconclusive/Other - Mechanically Inconclusive and general deconditioning    Principle of " Management:  Education:  Posture, therapeutic dose of exercise   Equipment provided:  Has lumbar roll, bands  Mechanical therapy (Y/N):  N   Extension principle:    Lateral Principle:    Flexion principle:    Other:  General core strengthening, spinal mobility    ASSESSMENT/PLAN:    Patient is a 79 year old female with thoracic, lumbar and weakness complaints.    Patient has the following significant findings with corresponding treatment plan.                Diagnosis 1:  Spine pain/weakness  Pain -  manual therapy, self management, education, directional preference exercise and home program  Decreased ROM/flexibility - manual therapy and therapeutic exercise  Decreased strength - therapeutic exercise and therapeutic activities  Impaired gait - gait training  Decreased function - therapeutic activities    Therapy Evaluation Codes:   1) History comprised of:   Personal factors that impact the plan of care:      None.    Comorbidity factors that impact the plan of care are:      Weakness and peripheral neuropathy.     Medications impacting care: Steroids and Thyroid.  2) Examination of Body Systems comprised of:   Body structures and functions that impact the plan of care:      Lumbar spine and Thoracic Spine.   Activity limitations that impact the plan of care are:      Lifting, Standing and Walking.  3) Clinical presentation characteristics are:   Evolving/Changing.  4) Decision-Making    Moderate complexity using standardized patient assessment instrument and/or measureable assessment of functional outcome.  Cumulative Therapy Evaluation is: Moderate complexity.    Previous and current functional limitations:  (See Goal Flow Sheet for this information)    Short term and Long term goals: (See Goal Flow Sheet for this information)     Communication ability:  Patient appears to be able to clearly communicate and understand verbal and written communication and follow directions correctly.  Treatment Explanation - The  following has been discussed with the patient:   RX ordered/plan of care  Anticipated outcomes  Possible risks and side effects  This patient would benefit from PT intervention to resume normal activities.   Rehab potential is good.    Frequency:  2 X a month, once daily  Duration:  for 3 months  Discharge Plan:  Achieve all LTG.  Independent in home treatment program.  Reach maximal therapeutic benefit.    Please refer to the daily flowsheet for treatment today, total treatment time and time spent performing 1:1 timed codes.

## 2020-03-03 ENCOUNTER — THERAPY VISIT (OUTPATIENT)
Dept: PHYSICAL THERAPY | Facility: CLINIC | Age: 80
End: 2020-03-03
Payer: MEDICARE

## 2020-03-03 DIAGNOSIS — M62.81 GENERALIZED MUSCLE WEAKNESS: ICD-10-CM

## 2020-03-03 DIAGNOSIS — M54.59 MECHANICAL LOW BACK PAIN: ICD-10-CM

## 2020-03-03 DIAGNOSIS — M54.6 PAIN IN THORACIC SPINE: ICD-10-CM

## 2020-03-03 PROCEDURE — 97110 THERAPEUTIC EXERCISES: CPT | Mod: GP | Performed by: PHYSICAL THERAPIST

## 2020-03-03 PROCEDURE — 97112 NEUROMUSCULAR REEDUCATION: CPT | Mod: GP | Performed by: PHYSICAL THERAPIST

## 2020-03-05 ENCOUNTER — TELEPHONE (OUTPATIENT)
Dept: INTERNAL MEDICINE | Facility: CLINIC | Age: 80
End: 2020-03-05

## 2020-03-05 NOTE — TELEPHONE ENCOUNTER
"Per 8/27/19 CT of chest:  \"IMPRESSION:   1. No evidence for pulmonary embolism, or acute thoracic aortic  abnormality.  2. Scattered pulmonary nodules again noted. Some of these may just be  mucus impaction. One of these is slightly more prominent at the  lateral right mid lung. Recommend follow-up CT chest in six months.  3. Splenomegaly has increased since 2015\"    This CT was ordered by Dr. Summers. Primary care provider, please advise if patient should contact Dr. Summers for this order?   "

## 2020-03-05 NOTE — TELEPHONE ENCOUNTER
Recommend through oncology if oncology originally ordered, as not sure if they want to follow up on anything in addition to her pulmonary nodules, such as her splenomegaly

## 2020-03-05 NOTE — TELEPHONE ENCOUNTER
Pt calling requesting chest CT orders. The one she had done on 8/27/20 recommends a follow chest CT in six months to look at pulmonary nodules. Pt can be reached at [phone]. OK to leave a detailed message. Please advise. Thanks.

## 2020-03-10 NOTE — PROGRESS NOTES
Late entry:  RNCC huddled with Provider Jose Summers MD Hematologist which recommends we wait until Pt meets criteria then schedule once Lab IGG levels fall under guideline criteria. Noted Pt has met criteria and scheduled next available infusion for IVIG. Pt aware and agrees with plan. No further questions or concerns.     Asha Shields, KALIN, RN, OCN  RN Cancer Care Coordinator  [facility]  [phone]

## 2020-03-16 ENCOUNTER — INFUSION THERAPY VISIT (OUTPATIENT)
Dept: INFUSION THERAPY | Facility: CLINIC | Age: 80
End: 2020-03-16
Attending: INTERNAL MEDICINE
Payer: MEDICARE

## 2020-03-16 ENCOUNTER — ONCOLOGY VISIT (OUTPATIENT)
Dept: ONCOLOGY | Facility: CLINIC | Age: 80
End: 2020-03-16
Attending: INTERNAL MEDICINE
Payer: MEDICARE

## 2020-03-16 ENCOUNTER — HOSPITAL ENCOUNTER (OUTPATIENT)
Facility: CLINIC | Age: 80
Setting detail: SPECIMEN
Discharge: HOME | End: 2020-03-16
Attending: INTERNAL MEDICINE | Admitting: INTERNAL MEDICINE
Payer: MEDICARE

## 2020-03-16 VITALS
SYSTOLIC BLOOD PRESSURE: 124 MMHG | TEMPERATURE: 98.1 F | OXYGEN SATURATION: 98 % | DIASTOLIC BLOOD PRESSURE: 71 MMHG | HEART RATE: 69 BPM | RESPIRATION RATE: 16 BRPM

## 2020-03-16 VITALS
DIASTOLIC BLOOD PRESSURE: 71 MMHG | SYSTOLIC BLOOD PRESSURE: 124 MMHG | TEMPERATURE: 98.1 F | HEART RATE: 69 BPM | OXYGEN SATURATION: 98 % | RESPIRATION RATE: 16 BRPM

## 2020-03-16 DIAGNOSIS — E03.9 HYPOTHYROIDISM, UNSPECIFIED TYPE: ICD-10-CM

## 2020-03-16 DIAGNOSIS — D83.9 CVID (COMMON VARIABLE IMMUNODEFICIENCY) (H): Primary | ICD-10-CM

## 2020-03-16 DIAGNOSIS — D83.9 CVID (COMMON VARIABLE IMMUNODEFICIENCY) (H): ICD-10-CM

## 2020-03-16 DIAGNOSIS — E87.6 HYPOKALEMIA: ICD-10-CM

## 2020-03-16 DIAGNOSIS — D59.19 OTHER AUTOIMMUNE HEMOLYTIC ANEMIAS: ICD-10-CM

## 2020-03-16 DIAGNOSIS — E53.8 B12 DEFICIENCY: ICD-10-CM

## 2020-03-16 LAB
ALBUMIN SERPL-MCNC: 3.8 G/DL (ref 3.4–5)
ALP SERPL-CCNC: 89 U/L (ref 40–150)
ALT SERPL W P-5'-P-CCNC: 21 U/L (ref 0–50)
ANION GAP SERPL CALCULATED.3IONS-SCNC: 3 MMOL/L (ref 3–14)
AST SERPL W P-5'-P-CCNC: 31 U/L (ref 0–45)
BASOPHILS # BLD AUTO: 0 10E9/L (ref 0–0.2)
BASOPHILS NFR BLD AUTO: 0.4 %
BILIRUB SERPL-MCNC: 0.6 MG/DL (ref 0.2–1.3)
BUN SERPL-MCNC: 15 MG/DL (ref 7–30)
CALCIUM SERPL-MCNC: 8.4 MG/DL (ref 8.5–10.1)
CHLORIDE SERPL-SCNC: 108 MMOL/L (ref 94–109)
CO2 SERPL-SCNC: 28 MMOL/L (ref 20–32)
CREAT SERPL-MCNC: 0.67 MG/DL (ref 0.52–1.04)
DIFFERENTIAL METHOD BLD: ABNORMAL
EOSINOPHIL # BLD AUTO: 0.1 10E9/L (ref 0–0.7)
EOSINOPHIL NFR BLD AUTO: 1 %
ERYTHROCYTE [DISTWIDTH] IN BLOOD BY AUTOMATED COUNT: 14.3 % (ref 10–15)
GFR SERPL CREATININE-BSD FRML MDRD: 83 ML/MIN/{1.73_M2}
GLUCOSE SERPL-MCNC: 128 MG/DL (ref 70–99)
HAPTOGLOB SERPL-MCNC: <3 MG/DL (ref 32–197)
HCT VFR BLD AUTO: 34.7 % (ref 35–47)
HGB BLD-MCNC: 10.7 G/DL (ref 11.7–15.7)
IGA SERPL-MCNC: <2 MG/DL (ref 84–499)
IGG SERPL-MCNC: 624 MG/DL (ref 610–1616)
IGM SERPL-MCNC: 28 MG/DL (ref 35–242)
IMM GRANULOCYTES # BLD: 0 10E9/L (ref 0–0.4)
IMM GRANULOCYTES NFR BLD: 0.6 %
LDH SERPL L TO P-CCNC: 232 U/L (ref 81–234)
LYMPHOCYTES # BLD AUTO: 1.5 10E9/L (ref 0.8–5.3)
LYMPHOCYTES NFR BLD AUTO: 29.2 %
MCH RBC QN AUTO: 26.6 PG (ref 26.5–33)
MCHC RBC AUTO-ENTMCNC: 30.8 G/DL (ref 31.5–36.5)
MCV RBC AUTO: 86 FL (ref 78–100)
MONOCYTES # BLD AUTO: 0.6 10E9/L (ref 0–1.3)
MONOCYTES NFR BLD AUTO: 12 %
NEUTROPHILS # BLD AUTO: 2.9 10E9/L (ref 1.6–8.3)
NEUTROPHILS NFR BLD AUTO: 56.8 %
NRBC # BLD AUTO: 0 10*3/UL
NRBC BLD AUTO-RTO: 0 /100
PLATELET # BLD AUTO: 146 10E9/L (ref 150–450)
POTASSIUM SERPL-SCNC: 3.9 MMOL/L (ref 3.4–5.3)
PROT SERPL-MCNC: 6.3 G/DL (ref 6.8–8.8)
RBC # BLD AUTO: 4.02 10E12/L (ref 3.8–5.2)
RETICS # AUTO: 45.8 10E9/L (ref 25–95)
RETICS/RBC NFR AUTO: 1.1 % (ref 0.5–2)
SODIUM SERPL-SCNC: 139 MMOL/L (ref 133–144)
TSH SERPL DL<=0.005 MIU/L-ACNC: 2.16 MU/L (ref 0.4–4)
VIT B12 SERPL-MCNC: 882 PG/ML (ref 193–986)
WBC # BLD AUTO: 5.2 10E9/L (ref 4–11)

## 2020-03-16 PROCEDURE — 96365 THER/PROPH/DIAG IV INF INIT: CPT

## 2020-03-16 PROCEDURE — 99213 OFFICE O/P EST LOW 20 MIN: CPT | Performed by: INTERNAL MEDICINE

## 2020-03-16 PROCEDURE — 83010 ASSAY OF HAPTOGLOBIN QUANT: CPT | Performed by: INTERNAL MEDICINE

## 2020-03-16 PROCEDURE — 82784 ASSAY IGA/IGD/IGG/IGM EACH: CPT | Performed by: INTERNAL MEDICINE

## 2020-03-16 PROCEDURE — 80053 COMPREHEN METABOLIC PANEL: CPT | Performed by: INTERNAL MEDICINE

## 2020-03-16 PROCEDURE — 84443 ASSAY THYROID STIM HORMONE: CPT | Performed by: INTERNAL MEDICINE

## 2020-03-16 PROCEDURE — 83615 LACTATE (LD) (LDH) ENZYME: CPT | Performed by: INTERNAL MEDICINE

## 2020-03-16 PROCEDURE — 96366 THER/PROPH/DIAG IV INF ADDON: CPT

## 2020-03-16 PROCEDURE — 96375 TX/PRO/DX INJ NEW DRUG ADDON: CPT

## 2020-03-16 PROCEDURE — 25000128 H RX IP 250 OP 636: Performed by: INTERNAL MEDICINE

## 2020-03-16 PROCEDURE — 85025 COMPLETE CBC W/AUTO DIFF WBC: CPT | Performed by: INTERNAL MEDICINE

## 2020-03-16 PROCEDURE — 82607 VITAMIN B-12: CPT | Performed by: INTERNAL MEDICINE

## 2020-03-16 PROCEDURE — 85045 AUTOMATED RETICULOCYTE COUNT: CPT | Performed by: INTERNAL MEDICINE

## 2020-03-16 RX ORDER — HEPARIN SODIUM,PORCINE 10 UNIT/ML
5 VIAL (ML) INTRAVENOUS
Status: CANCELLED | OUTPATIENT
Start: 2020-03-16

## 2020-03-16 RX ORDER — HEPARIN SODIUM (PORCINE) LOCK FLUSH IV SOLN 100 UNIT/ML 100 UNIT/ML
5 SOLUTION INTRAVENOUS
Status: CANCELLED | OUTPATIENT
Start: 2020-03-16

## 2020-03-16 RX ORDER — DIPHENHYDRAMINE HCL 25 MG
50 CAPSULE ORAL ONCE
Status: CANCELLED | OUTPATIENT
Start: 2020-03-16

## 2020-03-16 RX ORDER — POTASSIUM CHLORIDE 1500 MG/1
20 TABLET, EXTENDED RELEASE ORAL DAILY
Qty: 90 TABLET | Refills: 3 | Status: SHIPPED | OUTPATIENT
Start: 2020-03-16 | End: 2020-11-13

## 2020-03-16 RX ORDER — ACETAMINOPHEN 325 MG/1
650 TABLET ORAL ONCE
Status: CANCELLED
Start: 2020-03-16

## 2020-03-16 RX ADMIN — HUMAN IMMUNOGLOBULIN G 35 G: 20 LIQUID INTRAVENOUS at 11:37

## 2020-03-16 RX ADMIN — HYDROCORTISONE SODIUM SUCCINATE 100 MG: 100 INJECTION, POWDER, FOR SOLUTION INTRAMUSCULAR; INTRAVENOUS at 11:40

## 2020-03-16 NOTE — PROGRESS NOTES
[facility] CANCER CLINIC  FOLLOW-UP VISIT NOTE    PATIENT NAME: Tricia Sosa MRN # [id number]  DATE OF VISIT: Mar 16, 2020 YOB: 1940    REFERRING PROVIDER: No referring provider defined for this encounter.    DIAGNOSIS: Hemolytic anemia-autoimmune; IgA deficiency    TREATMENT SUMMARY:  Tricia has been diagnosed with IgA deficiency since her around 2000.  She was very sick at the time and had severe diarrhea.  She lost about 40 pounds in weight.  The workup revealed that she had markedly diminished CD4 counts of 48 and was diagnosed with CMV colitis.  Since then she has been followed in hematology clinic at [place] until recently.  She was noted to have anemia which was fairly long-standing.  She was initially referred for anemia in 2004 and also had a bone marrow biopsy done at that time which revealed hypercellular bone marrow with 70-80% cellularity and normal trilineage hematopoiesis and no morphologic features of MDS or lymphoproliferation.  Her anemia was attributed to her chronic underlying disease.  At the next evaluation in 2002 on 4 aggressive anemia there was no evidence of hemolysis, iron deficiency, B12 or folate deficiency.  Bone marrow biopsy was not pursued.  In summer of 2015 she had progressive fatigue, dyspnea on exertion and worsening anemia with a hemoglobin now of 9.  Workup at this time did suggest a hemolytic anemia with elevated LDH at 709, undetectable haptoglobin and elevated unconjugated bilirubin at 3.3.  Monospecific MARIKA was positive for both IgG and complement.  Cold agglutinin screen was positive with very low titer 1:64.  She was started on prednisone 60 mg daily with supplementation of iron folate and B12 starting 7/31/15.  Her prednisone has been slowly tapered and was discontinued off in January 2016.  She was last followed at [facility] on March 10 when she had stable hemoglobin.    Chemotherapy 8/28/2019 9/4/2019 9/12/2019 9/19/2019   Day,  Cycle Day 1, Cycle 1 Day 8, Cycle 1 Day 15, Cycle 1 Day 22, Cycle 1   riTUXimab (RITUXAN)  375 mg/m2 375 mg/m2 375 mg/m2 375 mg/m2     SUBJECTIVE   Tricia comes alone for this clinic visit for her hemolytic anemia.    Tricia comes in for a scheduled follow up. She has complete 4 weekly doses of rituximab on 9/19/19. She is stable and has no new complains.     Wt Readings from Last 10 Encounters:   02/17/20 73.4 kg (161 lb 12.8 oz)   12/09/19 70.4 kg (155 lb 3.2 oz)   11/20/19 66.7 kg (147 lb)   11/11/19 66.9 kg (147 lb 8 oz)   10/31/19 66.3 kg (146 lb 1.6 oz)   10/14/19 65.3 kg (144 lb)   10/02/19 65.5 kg (144 lb 6.4 oz)   09/20/19 66.7 kg (147 lb)   09/12/19 66.5 kg (146 lb 9.6 oz)   09/11/19 65.8 kg (145 lb)       PAST MEDICAL HISTORY   1. Common variable immunodeficiency syndrome  2. 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 Selective IgA deficiency  4. Leukopenia with markedly low CD4 counts on Bactrim prophylaxis  5. History of fall with subdural hematoma in 6/11/14 with complete resolution  6. Hashimoto's disease status post partial thyroidectomy      CURRENT OUTPATIENT MEDICATIONS     Current Outpatient Medications   Medication Sig     cyanocobalamin (VITAMIN  B-12) 1000 MCG tablet      folic acid (FOLVITE) 1 MG tablet      ketoconazole (NIZORAL) 2 % external shampoo Use every other day to scalp as needed     levothyroxine (SYNTHROID/LEVOTHROID) 150 MCG tablet TAKE ONE TABLET BY MOUTH ONE TIME DAILY      potassium chloride ER (K-DUR/KLOR-CON M) 20 MEQ CR tablet Take 1 tablet (20 mEq) by mouth daily     sulfamethoxazole-trimethoprim (BACTRIM DS/SEPTRA DS) 800-160 MG tablet Take 1 pill orally on Mon, Wed and Friday     UNABLE TO FIND privigen inj monthly     No current facility-administered medications for this visit.         ALLERGIES     Allergies   Allergen Reactions     Doxycycline         REVIEW OF SYSTEMS   As above in the HPI, o/w complete 12-point ROS was  negative.     PHYSICAL EXAM   LMP  (LMP Unknown)     SpO2 Readings from Last 4 Encounters:   11/13/18 99%   10/16/18 99%   09/27/18 98%   09/19/18 100%     Wt Readings from Last 3 Encounters:   02/17/20 73.4 kg (161 lb 12.8 oz)   12/09/19 70.4 kg (155 lb 3.2 oz)   11/20/19 66.7 kg (147 lb)     GEN: NAD  HEENT: PERRL, EOMI, no icterus, injection or pallor. Oropharynx is clear.  NECK: no cervical or supraclavicular lymphadenopathy  LUNGS: clear bilaterally  CV: regular, no murmurs, rubs, or gallops  ABDOMEN: soft, non-tender, non-distended, normal bowel sounds, no hepatosplenomegaly by percussion or palpation  EXT: warm, well perfused, no edema  NEURO: alert  SKIN: no rashes     LABORATORY AND IMAGING STUDIES       Recent Labs   Lab Test 03/16/20  1034 01/30/20  1024 01/14/20  1033 12/09/19  0950 11/20/19  1042 11/11/19  1025    140 139 140  --  139   POTASSIUM 3.9 4.0 4.0 3.7 3.7 3.3*   CHLORIDE 108 106 106 107  --  104   CO2 28 31 28 26  --  27   ANIONGAP 3 3 5 7  --  8   BUN 15 14 16 14  --  17   CR 0.67 0.71 0.75 0.70  --  0.59   * 76 90 124*  --  145*   CULLEN 8.4* 8.7 8.8 8.8  --  8.5     No results for input(s): MAG, PHOS in the last 75725 hours.  Recent Labs   Lab Test 03/16/20  1034 01/30/20  1024 01/14/20  1033 12/09/19  0950 11/11/19  1025   WBC 5.2 7.6 7.2 5.9 4.1   HGB 10.7* 11.0* 11.2* 11.1* 10.3*   * 183 175 184 158   MCV 86 85 85 85 87   NEUTROPHIL 56.8 75.2 87.3 60.8 61.4     Recent Labs   Lab Test 03/16/20  1034 01/30/20  1024 01/14/20  1033   BILITOTAL 0.6 0.6 0.7   ALKPHOS 89 84 79   ALT 21 29 27   AST 31 25 38   ALBUMIN 3.8 3.9 4.1    275* 268*     TSH   Date Value Ref Range Status   03/16/2020 2.16 0.40 - 4.00 mU/L Final   10/16/2018 2.81 0.40 - 4.00 mU/L Final   10/20/2017 1.90 0.40 - 4.00 mU/L Final     No results for input(s): CEA in the last 57764 hours.  Results for orders placed or performed during the hospital encounter of 09/11/19   MA Screen Bilateral w/Chivo     Narrative    SCREENING MAMMOGRAM, BILATERAL, DIGITAL w/CAD AND TOMOSYNTHESIS -  9/11/2019 11:46 AM    BREAST SYMPTOMS: No current breast complaints.     COMPARISON:  11/10/2017, 6/5/2012.    BREAST DENSITY: Heterogeneously dense.    COMMENTS: No findings of suspicion for malignancy.       Impression    IMPRESSION: BI-RADS CATEGORY: 1 -  Negative    RECOMMENDED FOLLOW-UP: Annual Mammography.    Exam results letter mailed to patient.      AUGUSTIN SALGUERO MD     Recent Labs   Lab Test 01/30/20  1024 01/14/20  1033 12/09/19  0950 11/11/19  1025 10/14/19  1208  02/25/16  0903   IGA <2* <2* <7* <7* 7*   < >  --    * [phone] 788   < >  --    IGM 27* 33* 28* 43* 109   < >  --    ELPM  --   --   --   --   --   --  0.0    < > = values in this interval not displayed.      ASSESSMENT AND PLAN   1. 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 Common variable immunodeficiency disorder  4. Splenomegaly    Tricia comes in for a scheduled follow up visit. We will continue with her IV Ig today. that she has been doing all along. We will continue to monitor. I do not have her most recent IgG levels.  I think we could wait for another 2 to 3 months before checking her the next time.  I explained her that she is at a higher risk given her combined variable immune at deficiency from the ongoing COVID 19 pandemic.  We will try and keep her away from the clinic as long as possible.  She should obviously give us a call if she is not feeling well.    I will see her in 3 months with labs a week prior to visit and possible IgG infusion after visit.      Jose Spain ,  Division of Hematology, Oncology & Transplantation  [facility].

## 2020-03-16 NOTE — PROGRESS NOTES
Infusion Nursing Note:  Tricia Sosa presents today for PIV start and labs.    Patient seen by provider today: Yes: Melina   present during visit today: Not Applicable.    Note: N/A.    Intravenous Access:  Lab draw site L lower FA, Needle type angiocath, Gauge 24.  Labs drawn without difficulty.  Peripheral IV placed.    Treatment Conditions:  Not Applicable.      Post Infusion Assessment:  Patient tolerated procedure without incident.  Site patent and intact, free from redness, edema or discomfort.  No evidence of extravasations.       Discharge Plan:   To infusion for MD appointment and infusion.    STACY CHEATHAM RN

## 2020-03-16 NOTE — LETTER
3/16/2020         RE: Tricia Sosa  [id number]  [facility] [id number]        Dear Colleague,    Thank you for referring your patient, Tricia Sosa, to the [facility]. Please see a copy of my visit note below.    [facility]  FOLLOW-UP VISIT NOTE    PATIENT NAME: Tricia Sosa MRN # [id number]  DATE OF VISIT: Mar 16, 2020 YOB: 1940    REFERRING PROVIDER: No referring provider defined for this encounter.    DIAGNOSIS: Hemolytic anemia-autoimmune; IgA deficiency    TREATMENT SUMMARY:  Tricia has been diagnosed with IgA deficiency since her around 2000.  She was very sick at the time and had severe diarrhea.  She lost about 40 pounds in weight.  The workup revealed that she had markedly diminished CD4 counts of 48 and was diagnosed with CMV colitis.  Since then she has been followed in hematology clinic at [facility] until recently.  She was noted to have anemia which was fairly long-standing.  She was initially referred for anemia in 2004 and also had a bone marrow biopsy done at that time which revealed hypercellular bone marrow with 70-80% cellularity and normal trilineage hematopoiesis and no morphologic features of MDS or lymphoproliferation.  Her anemia was attributed to her chronic underlying disease.  At the next evaluation in 2002 on 4 aggressive anemia there was no evidence of hemolysis, iron deficiency, B12 or folate deficiency.  Bone marrow biopsy was not pursued.  In summer of 2015 she had progressive fatigue, dyspnea on exertion and worsening anemia with a hemoglobin now of 9.  Workup at this time did suggest a hemolytic anemia with elevated LDH at 709, undetectable haptoglobin and elevated unconjugated bilirubin at 3.3.  Monospecific MARIKA was positive for both IgG and complement.  Cold agglutinin screen was positive with very low titer 1:64.  She was started on prednisone 60 mg daily with supplementation of iron folate and B12  starting 7/31/15.  Her prednisone has been slowly tapered and was discontinued off in January 2016.  She was last followed at [facility] on March 10 when she had stable hemoglobin.    Chemotherapy 8/28/2019 9/4/2019 9/12/2019 9/19/2019   Day, Cycle Day 1, Cycle 1 Day 8, Cycle 1 Day 15, Cycle 1 Day 22, Cycle 1   riTUXimab (RITUXAN)  375 mg/m2 375 mg/m2 375 mg/m2 375 mg/m2     SUBJECTIVE   Tricia comes alone for this clinic visit for her hemolytic anemia.    Tricia comes in for a scheduled follow up. She has complete 4 weekly doses of rituximab on 9/19/19. She is stable and has no new complains.     Wt Readings from Last 10 Encounters:   02/17/20 73.4 kg (161 lb 12.8 oz)   12/09/19 70.4 kg (155 lb 3.2 oz)   11/20/19 66.7 kg (147 lb)   11/11/19 66.9 kg (147 lb 8 oz)   10/31/19 66.3 kg (146 lb 1.6 oz)   10/14/19 65.3 kg (144 lb)   10/02/19 65.5 kg (144 lb 6.4 oz)   09/20/19 66.7 kg (147 lb)   09/12/19 66.5 kg (146 lb 9.6 oz)   09/11/19 65.8 kg (145 lb)       PAST MEDICAL HISTORY   1. Common variable immunodeficiency syndrome  2. 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 Selective IgA deficiency  4. Leukopenia with markedly low CD4 counts on Bactrim prophylaxis  5. History of fall with subdural hematoma in 6/11/14 with complete resolution  6. Hashimoto's disease status post partial thyroidectomy      CURRENT OUTPATIENT MEDICATIONS     Current Outpatient Medications   Medication Sig     cyanocobalamin (VITAMIN  B-12) 1000 MCG tablet      folic acid (FOLVITE) 1 MG tablet      ketoconazole (NIZORAL) 2 % external shampoo Use every other day to scalp as needed     levothyroxine (SYNTHROID/LEVOTHROID) 150 MCG tablet TAKE ONE TABLET BY MOUTH ONE TIME DAILY      potassium chloride ER (K-DUR/KLOR-CON M) 20 MEQ CR tablet Take 1 tablet (20 mEq) by mouth daily     sulfamethoxazole-trimethoprim (BACTRIM DS/SEPTRA DS) 800-160 MG tablet Take 1 pill orally on Mon, Wed and Friday     UNABLE TO  FIND privigen inj monthly     No current facility-administered medications for this visit.         ALLERGIES     Allergies   Allergen Reactions     Doxycycline         REVIEW OF SYSTEMS   As above in the HPI, o/w complete 12-point ROS was negative.     PHYSICAL EXAM   LMP  (LMP Unknown)     SpO2 Readings from Last 4 Encounters:   11/13/18 99%   10/16/18 99%   09/27/18 98%   09/19/18 100%     Wt Readings from Last 3 Encounters:   02/17/20 73.4 kg (161 lb 12.8 oz)   12/09/19 70.4 kg (155 lb 3.2 oz)   11/20/19 66.7 kg (147 lb)     GEN: NAD  HEENT: PERRL, EOMI, no icterus, injection or pallor. Oropharynx is clear.  NECK: no cervical or supraclavicular lymphadenopathy  LUNGS: clear bilaterally  CV: regular, no murmurs, rubs, or gallops  ABDOMEN: soft, non-tender, non-distended, normal bowel sounds, no hepatosplenomegaly by percussion or palpation  EXT: warm, well perfused, no edema  NEURO: alert  SKIN: no rashes     LABORATORY AND IMAGING STUDIES       Recent Labs   Lab Test 03/16/20  1034 01/30/20  1024 01/14/20  1033 12/09/19  0950 11/20/19  1042 11/11/19  1025    140 139 140  --  139   POTASSIUM 3.9 4.0 4.0 3.7 3.7 3.3*   CHLORIDE 108 106 106 107  --  104   CO2 28 31 28 26  --  27   ANIONGAP 3 3 5 7  --  8   BUN 15 14 16 14  --  17   CR 0.67 0.71 0.75 0.70  --  0.59   * 76 90 124*  --  145*   CULLEN 8.4* 8.7 8.8 8.8  --  8.5     No results for input(s): MAG, PHOS in the last 18940 hours.  Recent Labs   Lab Test 03/16/20  1034 01/30/20  1024 01/14/20  1033 12/09/19  0950 11/11/19  1025   WBC 5.2 7.6 7.2 5.9 4.1   HGB 10.7* 11.0* 11.2* 11.1* 10.3*   * 183 175 184 158   MCV 86 85 85 85 87   NEUTROPHIL 56.8 75.2 87.3 60.8 61.4     Recent Labs   Lab Test 03/16/20  1034 01/30/20  1024 01/14/20  1033   BILITOTAL 0.6 0.6 0.7   ALKPHOS 89 84 79   ALT 21 29 27   AST 31 25 38   ALBUMIN 3.8 3.9 4.1    275* 268*     TSH   Date Value Ref Range Status   03/16/2020 2.16 0.40 - 4.00 mU/L Final   10/16/2018 2.81  0.40 - 4.00 mU/L Final   10/20/2017 1.90 0.40 - 4.00 mU/L Final     No results for input(s): CEA in the last 82345 hours.  Results for orders placed or performed during the hospital encounter of 09/11/19   MA Screen Bilateral w/Chivo    Narrative    SCREENING MAMMOGRAM, BILATERAL, DIGITAL w/CAD AND TOMOSYNTHESIS -  9/11/2019 11:46 AM    BREAST SYMPTOMS: No current breast complaints.     COMPARISON:  11/10/2017, 6/5/2012.    BREAST DENSITY: Heterogeneously dense.    COMMENTS: No findings of suspicion for malignancy.       Impression    IMPRESSION: BI-RADS CATEGORY: 1 -  Negative    RECOMMENDED FOLLOW-UP: Annual Mammography.    Exam results letter mailed to patient.      AUGUSTIN SALGUERO MD     Recent Labs   Lab Test 01/30/20  1024 01/14/20  1033 12/09/19  0950 11/11/19  1025 10/14/19  1208  02/25/16  0903   IGA <2* <2* <7* <7* 7*   < >  --    * [phone] 788   < >  --    IGM 27* 33* 28* 43* 109   < >  --    ELPM  --   --   --   --   --   --  0.0    < > = values in this interval not displayed.      ASSESSMENT AND PLAN   1. 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 Common variable immunodeficiency disorder  4. Splenomegaly    Tricia comes in for a scheduled follow up visit. We will continue with her IV Ig today. that she has been doing all along. We will continue to monitor. I do not have her most recent IgG levels.  I think we could wait for another 2 to 3 months before checking her the next time.  I explained her that she is at a higher risk given her combined variable immune at deficiency from the ongoing COVID 19 pandemic.  We will try and keep her away from the clinic as long as possible.  She should obviously give us a call if she is not feeling well.    I will see her in 3 months with labs a week prior to visit and possible IgG infusion after visit.      Jose Spain ,  Division of  Hematology, Oncology & Transplantation  [facility].        Again, thank you for allowing me to participate in the care of your patient.        Sincerely,        Jose Summers MD

## 2020-03-16 NOTE — PROGRESS NOTES
Infusion Nursing Note:  Tricia Sosa presents today for IVIG.    Patient seen by provider today: Yes: Melina   present during visit today: Not Applicable.    Note: Has been taking bactrim prophylactically since 2004    Intravenous Access:  Peripheral IV placed.    Treatment Conditions:  Biological Infusion Checklist:  ~~~ NOTE: If the patient answers yes to any of the questions below, hold the infusion and contact ordering provider or on-call provider.    1. Have you recently had an elevated temperature, fever, chills, productive cough, coughing for 3 weeks or longer or hemoptysis, abnormal vital signs, night sweats,  chest pain or have you noticed a decrease in your appetite, unexplained weight loss or fatigue? No  2. Do you have any open wounds or new incisions? No  3. Do you have any recent or upcoming hospitalizations, surgeries or dental procedures? No  4. Do you currently have or recently have had any signs of illness or infection or are you on any antibiotics? Yes, bactrim 3 times per week  5. Have you had any new, sudden or worsening abdominal pain? No  6. Have you or anyone in your household received a live vaccination in the past 4 weeks? Please note:  No live vaccines while on biologic/chemotherapy until 6 months after the last treatment.  Patient can receive the flu vaccine (shot only) and the pneumovax.  It is optimal for the patient to get these vaccines mid cycle, but they can be given at any time as long as it is not on the day of the infusion. No  7. Have you recently been diagnosed with any new nervous system diseases (ie. Multiple sclerosis, Guillain [place], seizures, neurological changes) or cancer diagnosis? No  8. Are you on any form of radiation or chemotherapy? No  9. Are you pregnant or breast feeding or do you have plans of pregnancy in the future? No  10. Have you been having any signs of worsening depression or suicidal ideations?  (benlysta only) No  11. Have there been any  other new onset medical symptoms? No        Post Infusion Assessment:  Patient tolerated infusion without incident.  Site patent and intact, free from redness, edema or discomfort.  No evidence of extravasations.  Access discontinued per protocol.       Discharge Plan:   AVS to patient via MYCHART.     Patient discharged in stable condition accompanied by: self.  Departure Mode: Ambulatory.    Ally Eller RN

## 2020-03-17 ENCOUNTER — THERAPY VISIT (OUTPATIENT)
Dept: PHYSICAL THERAPY | Facility: CLINIC | Age: 80
End: 2020-03-17
Payer: MEDICARE

## 2020-03-17 DIAGNOSIS — M54.6 PAIN IN THORACIC SPINE: ICD-10-CM

## 2020-03-17 DIAGNOSIS — M62.81 GENERALIZED MUSCLE WEAKNESS: ICD-10-CM

## 2020-03-17 DIAGNOSIS — M54.59 MECHANICAL LOW BACK PAIN: ICD-10-CM

## 2020-03-17 PROCEDURE — 97530 THERAPEUTIC ACTIVITIES: CPT | Mod: GP | Performed by: PHYSICAL THERAPIST

## 2020-03-17 PROCEDURE — 97110 THERAPEUTIC EXERCISES: CPT | Mod: GP | Performed by: PHYSICAL THERAPIST

## 2020-03-18 DIAGNOSIS — E03.9 HYPOTHYROIDISM, UNSPECIFIED TYPE: ICD-10-CM

## 2020-03-18 NOTE — TELEPHONE ENCOUNTER
"Requested Prescriptions   Pending Prescriptions Disp Refills     levothyroxine (SYNTHROID/LEVOTHROID) 150 MCG tablet 30 tablet 0     Sig: Take 1 tablet (150 mcg) by mouth daily  Last Written Prescription Date:  02/12/20  Last Fill Quantity: 30,  # refills: 0   Last office visit: 10/31/2019 with prescribing provider:  Lynnette   Future Office Visit:           Thyroid Protocol Passed - 3/18/2020 11:32 AM        Passed - Patient is 12 years or older        Passed - Recent (12 mo) or future (30 days) visit within the authorizing provider's specialty     Patient has had an office visit with the authorizing provider or a provider within the authorizing providers department within the previous 12 mos or has a future within next 30 days. See \"Patient Info\" tab in inbasket, or \"Choose Columns\" in Meds & Orders section of the refill encounter.              Passed - Medication is active on med list        Passed - Normal TSH on file in past 12 months     Recent Labs   Lab Test 03/16/20  1034   TSH 2.16              Passed - No active pregnancy on record     If patient is pregnant or has had a positive pregnancy test, please check TSH.          Passed - No positive pregnancy test in past 12 months     If patient is pregnant or has had a positive pregnancy test, please check TSH.               "

## 2020-03-19 DIAGNOSIS — E03.9 HYPOTHYROIDISM, UNSPECIFIED TYPE: ICD-10-CM

## 2020-03-19 NOTE — TELEPHONE ENCOUNTER
"Requested Prescriptions   Pending Prescriptions Disp Refills     levothyroxine (SYNTHROID/LEVOTHROID) 150 MCG tablet [Pharmacy Med Name: Levothyroxine Sodium Oral Tablet 150 MCG] 30 tablet 0     Sig: TAKE ONE TABLET BY MOUTH ONE TIME DAILY   Last Written Prescription Date:  02/12/2020  Last Fill Quantity: 30,  # refills: 0   Last office visit: 10/31/2019 with prescribing provider:     Future Office Visit:      Thyroid Protocol Passed - 3/19/2020 10:05 AM        Passed - Patient is 12 years or older        Passed - Recent (12 mo) or future (30 days) visit within the authorizing provider's specialty     Patient has had an office visit with the authorizing provider or a provider within the authorizing providers department within the previous 12 mos or has a future within next 30 days. See \"Patient Info\" tab in inbasket, or \"Choose Columns\" in Meds & Orders section of the refill encounter.              Passed - Medication is active on med list        Passed - Normal TSH on file in past 12 months     Recent Labs   Lab Test 03/16/20  1034   TSH 2.16              Passed - No active pregnancy on record     If patient is pregnant or has had a positive pregnancy test, please check TSH.          Passed - No positive pregnancy test in past 12 months     If patient is pregnant or has had a positive pregnancy test, please check TSH.             "

## 2020-03-20 RX ORDER — LEVOTHYROXINE SODIUM 150 UG/1
150 TABLET ORAL DAILY
Qty: 90 TABLET | Refills: 3 | Status: SHIPPED | OUTPATIENT
Start: 2020-03-20 | End: 2021-05-07

## 2020-03-20 NOTE — TELEPHONE ENCOUNTER
Routing refill request to provider for review/approval because:  Recent TSH level has not yet been reviewed by primary care provider

## 2020-03-22 RX ORDER — LEVOTHYROXINE SODIUM 150 UG/1
TABLET ORAL
Qty: 30 TABLET | Refills: 0 | OUTPATIENT
Start: 2020-03-22

## 2020-06-08 ENCOUNTER — VIRTUAL VISIT (OUTPATIENT)
Dept: INTERNAL MEDICINE | Facility: CLINIC | Age: 80
End: 2020-06-08
Payer: MEDICARE

## 2020-06-08 ENCOUNTER — TELEPHONE (OUTPATIENT)
Dept: INTERNAL MEDICINE | Facility: CLINIC | Age: 80
End: 2020-06-08

## 2020-06-08 DIAGNOSIS — M25.511 RIGHT SHOULDER PAIN, UNSPECIFIED CHRONICITY: Primary | ICD-10-CM

## 2020-06-08 DIAGNOSIS — L30.9 DERMATITIS: ICD-10-CM

## 2020-06-08 DIAGNOSIS — G56.22 CUBITAL TUNNEL SYNDROME ON LEFT: ICD-10-CM

## 2020-06-08 PROCEDURE — 99443 ZZC PHYSICIAN TELEPHONE EVALUATION 21-30 MIN: CPT | Performed by: INTERNAL MEDICINE

## 2020-06-08 RX ORDER — HYDROCORTISONE BUTYRATE 1 MG/ML
SOLUTION TOPICAL
Qty: 60 ML | Refills: 11 | Status: SHIPPED | OUTPATIENT
Start: 2020-06-08 | End: 2022-06-24

## 2020-06-08 NOTE — PROGRESS NOTES
"Tricia Sosa is a 79 year old female who is being evaluated via a billable telephone visit.      The patient has been notified of following:     \"This telephone visit will be conducted via a call between you and your physician/provider. We have found that certain health care needs can be provided without the need for a physical exam.  This service lets us provide the care you need with a short phone conversation.  If a prescription is necessary we can send it directly to your pharmacy.  If lab work is needed we can place an order for that and you can then stop by our lab to have the test done at a later time.    Telephone visits are billed at different rates depending on your insurance coverage. During this emergency period, for some insurers they may be billed the same as an in-person visit.  Please reach out to your insurance provider with any questions.    If during the course of the call the physician/provider feels a telephone visit is not appropriate, you will not be charged for this service.\"    Patient has given verbal consent for Telephone visit?  Yes    What phone number would you like to be contacted at? [phone]    How would you like to obtain your AVS? Mail a copy    Subjective     Tricia Sosa is a 79 year old female who presents via phone visit today for the following health issues:    HPI    1.  Right upper arm discomfort: This is been present for about 3 months.  It is not really pain, more aching or discomfort that is in the deltoid area of the upper arm.  This has been something that she is noticed a little more in the evening and at night.  In the last week or so the symptoms have been worse so now she is having some pain lifting her arm in the morning and it is awakening her from sleep at times.  She has not had any injury that she is aware of.    2.  Itchy scalp: This is a problem she had for many years, saw dermatologist about 6 years ago and was prescribed fluocinonide solution which " helped a lot.  She also was given ketoconazole shampoo.  She has continued using the shampoo but is out of the fluocinonide.  When she checked with the pharmacy the cost would be over $100.  She is wondering about referral to a new dermatologist, other options for treatment.    3.  Cubital tunnel syndrome: She had a visit with neurology who felt that she had left arm cubital tunnel syndrome with tingling in her fifth finger.  The neurologist will be following up on that.  She is trying to sleep with her arm straight.    Patient Active Problem List   Diagnosis     Lymphocytic colitis     Acquired hypothyroidism     CVID (common variable immunodeficiency) (H)     B12 deficiency     CARDIOVASCULAR SCREENING; LDL GOAL LESS THAN 130     Other autoimmune hemolytic anemias (H)     Right-sided low back pain with right-sided sciatica, unspecified chronicity     Generalized muscle weakness     Pain in thoracic spine     Mechanical low back pain     Current Outpatient Medications   Medication Sig Dispense Refill     cyanocobalamin (VITAMIN  B-12) 1000 MCG tablet   3     folic acid (FOLVITE) 1 MG tablet   3     hydrocortisone butyrate (LOCOID) 0.1 % SOLN solution Apply to scalp bid prn 60 mL 11     ketoconazole (NIZORAL) 2 % external shampoo Use every other day to scalp as needed 120 mL 11     levothyroxine (SYNTHROID/LEVOTHROID) 150 MCG tablet Take 1 tablet (150 mcg) by mouth daily 90 tablet 3     potassium chloride ER (KLOR-CON M) 20 MEQ CR tablet Take 1 tablet (20 mEq) by mouth daily 90 tablet 3     sulfamethoxazole-trimethoprim (BACTRIM DS/SEPTRA DS) 800-160 MG tablet Take 1 pill orally on Mon, Wed and Friday 45 tablet 3     UNABLE TO FIND privigen inj monthly        Social History     Tobacco Use     Smoking status: Never Smoker     Smokeless tobacco: Never Used   Substance Use Topics     Alcohol use: No     Alcohol/week: 0.0 standard drinks     Frequency: Never     Drug use: No        Reviewed and updated as needed this  visit by Provider         Review of Systems   No fever, chills, no neck pain, no numbness or tingling in the right arm       Objective   Reported vitals:  LMP  (LMP Unknown)      PSYCH: Alert and oriented times 3; coherent speech, normal   rate and volume, able to articulate logical thoughts, able   to abstract reason, no tangential thoughts, no hallucinations   or delusions  Her affect is normal  RESP: No cough, no audible wheezing, able to talk in full sentences  Remainder of exam unable to be completed due to telephone visits            Assessment/Plan:  1. Right shoulder pain, unspecified chronicity  Advised most likely this upper arm pain is related to her shoulder, probably some tendinitis/impingement.  Given the link to exercises, if not improving would refer to orthopedist    2. Dermatitis  Advised can try hydrocortisone solution which is likely to be less expensive, it is also available over-the-counter as brand-name Scalpicin.  We will try ordering though as a prescription.  We will also give dermatology referral if symptoms do not improve.  The rest of the topical steroids are likely to be fairly expensive  - hydrocortisone butyrate (LOCOID) 0.1 % SOLN solution; Apply to scalp bid prn  Dispense: 60 mL; Refill: 11  - DERMATOLOGY REFERRAL    3. Cubital tunnel syndrome on left  She is working on this, follow-up with neurology      Return in about 1 month (around 7/11/2020) for Physical Exam.      Phone call duration:  24 minutes    Emily Church MD

## 2020-06-08 NOTE — TELEPHONE ENCOUNTER
Pharmacy calling stating that Hydrocortisone prescription that was sent to them today is not covered by the pt's insurance. They would like to know if an alternative medication can be prescribed. They can be reached at [phone]. Please advise. Thanks.

## 2020-06-08 NOTE — PATIENT INSTRUCTIONS
This is a link to the shoulder exercises:   [email or web address]

## 2020-06-09 NOTE — TELEPHONE ENCOUNTER
Advised the pharmacy to cancel the prescription and call the patient and tell her to use the over-the-counter Scalpicin as we had discussed on the phone visit.

## 2020-06-23 ENCOUNTER — TELEPHONE (OUTPATIENT)
Dept: INTERNAL MEDICINE | Facility: CLINIC | Age: 80
End: 2020-06-23

## 2020-06-23 ENCOUNTER — MYC MEDICAL ADVICE (OUTPATIENT)
Dept: INTERNAL MEDICINE | Facility: CLINIC | Age: 80
End: 2020-06-23

## 2020-06-23 NOTE — TELEPHONE ENCOUNTER
Patient calling  Her ankle and feet are a little swollen and the skin in patches has red discoloration. Please advise  Ok to call and [phone]

## 2020-06-23 NOTE — TELEPHONE ENCOUNTER
Called patient and assisted in scheduling an in clinic appointment.    Next 5 appointments (look out 90 days)    Jun 24, 2020  2:40 PM CDT  SHORT with Bozena Cook MD  [facility] ([facility]) [address]  [facility] [id number] [phone]

## 2020-06-23 NOTE — TELEPHONE ENCOUNTER
"Called patient and she stated her legs have been swelling (mild) for the past several months. Patient has noted that her swelling has been increasing lately.  Patient states mainly right leg.  Patient noted last evening that the bottom 2/3 of her leg had red spots on her leg.  Patient stated that her inner ankle bone is \"not there\" due to swelling.  Patient noting pinkness on top of foot and goes around the ankle.  Patient denied any fevers or increased warmth or sob. Patient has been sitting a lot at her computer.  Advised patient to elevate legs and can do ankle pumps and circles to help get the water out of her legs.  Please advise on any recommendations or if patient needs an appointment.  thanks.      Patient going to be sending a myc message with pictures attached. Will route to provider when message arrives.    "

## 2020-06-24 ENCOUNTER — OFFICE VISIT (OUTPATIENT)
Dept: INTERNAL MEDICINE | Facility: CLINIC | Age: 80
End: 2020-06-24
Payer: MEDICARE

## 2020-06-24 VITALS
HEART RATE: 99 BPM | DIASTOLIC BLOOD PRESSURE: 73 MMHG | SYSTOLIC BLOOD PRESSURE: 137 MMHG | RESPIRATION RATE: 19 BRPM | WEIGHT: 155.6 LBS | HEIGHT: 72 IN | TEMPERATURE: 98.3 F | BODY MASS INDEX: 21.08 KG/M2 | OXYGEN SATURATION: 99 %

## 2020-06-24 DIAGNOSIS — D59.19 OTHER AUTOIMMUNE HEMOLYTIC ANEMIAS: ICD-10-CM

## 2020-06-24 DIAGNOSIS — R60.0 BILATERAL LEG EDEMA: Primary | ICD-10-CM

## 2020-06-24 DIAGNOSIS — D83.9 CVID (COMMON VARIABLE IMMUNODEFICIENCY) (H): ICD-10-CM

## 2020-06-24 DIAGNOSIS — E53.8 B12 DEFICIENCY: ICD-10-CM

## 2020-06-24 LAB
BASOPHILS # BLD AUTO: 0 10E9/L (ref 0–0.2)
BASOPHILS NFR BLD AUTO: 0.1 %
DIFFERENTIAL METHOD BLD: ABNORMAL
EOSINOPHIL # BLD AUTO: 0.1 10E9/L (ref 0–0.7)
EOSINOPHIL NFR BLD AUTO: 2 %
ERYTHROCYTE [DISTWIDTH] IN BLOOD BY AUTOMATED COUNT: 14.8 % (ref 10–15)
HCT VFR BLD AUTO: 30.4 % (ref 35–47)
HGB BLD-MCNC: 9.3 G/DL (ref 11.7–15.7)
LYMPHOCYTES # BLD AUTO: 1.4 10E9/L (ref 0.8–5.3)
LYMPHOCYTES NFR BLD AUTO: 20.5 %
MCH RBC QN AUTO: 31 PG (ref 26.5–33)
MCHC RBC AUTO-ENTMCNC: 30.6 G/DL (ref 31.5–36.5)
MCV RBC AUTO: 101 FL (ref 78–100)
MONOCYTES # BLD AUTO: 0.8 10E9/L (ref 0–1.3)
MONOCYTES NFR BLD AUTO: 11.6 %
NEUTROPHILS # BLD AUTO: 4.5 10E9/L (ref 1.6–8.3)
NEUTROPHILS NFR BLD AUTO: 65.8 %
PLATELET # BLD AUTO: 150 10E9/L (ref 150–450)
RBC # BLD AUTO: 3 10E12/L (ref 3.8–5.2)
RETICS # AUTO: 182.1 10E9/L (ref 25–95)
RETICS/RBC NFR AUTO: 6.1 % (ref 0.5–2)
WBC # BLD AUTO: 6.9 10E9/L (ref 4–11)

## 2020-06-24 PROCEDURE — 82607 VITAMIN B-12: CPT | Performed by: INTERNAL MEDICINE

## 2020-06-24 PROCEDURE — 36415 COLL VENOUS BLD VENIPUNCTURE: CPT | Performed by: INTERNAL MEDICINE

## 2020-06-24 PROCEDURE — 82784 ASSAY IGA/IGD/IGG/IGM EACH: CPT | Performed by: INTERNAL MEDICINE

## 2020-06-24 PROCEDURE — 85025 COMPLETE CBC W/AUTO DIFF WBC: CPT | Performed by: INTERNAL MEDICINE

## 2020-06-24 PROCEDURE — 85045 AUTOMATED RETICULOCYTE COUNT: CPT | Performed by: INTERNAL MEDICINE

## 2020-06-24 PROCEDURE — 83615 LACTATE (LD) (LDH) ENZYME: CPT | Performed by: INTERNAL MEDICINE

## 2020-06-24 PROCEDURE — 80053 COMPREHEN METABOLIC PANEL: CPT | Performed by: INTERNAL MEDICINE

## 2020-06-24 PROCEDURE — 83010 ASSAY OF HAPTOGLOBIN QUANT: CPT | Performed by: INTERNAL MEDICINE

## 2020-06-24 PROCEDURE — 99214 OFFICE O/P EST MOD 30 MIN: CPT | Performed by: INTERNAL MEDICINE

## 2020-06-24 RX ORDER — HYDROCHLOROTHIAZIDE 12.5 MG/1
12.5 TABLET ORAL DAILY
Qty: 30 TABLET | Refills: 1 | Status: SHIPPED | OUTPATIENT
Start: 2020-06-24 | End: 2021-01-06

## 2020-06-24 ASSESSMENT — MIFFLIN-ST. JEOR: SCORE: 1292.8

## 2020-06-24 NOTE — PROGRESS NOTES
"  Patient's instructions / PLAN:                                                        Plan:  1. Keeps the feet elevated when you rest  2. Hydrochlorothiazide 12.5 mg daily for a week then take it as needed  3. Compression socks  - knee high  4.  Labs today - [address]       ASSESSMENT & PLAN:                                                        (R60.0) Bilateral leg edema  (primary encounter diagnosis)  Comment: We discussed about the new meds, advantages and potential side effects. The patient will read also the info from the pharmacy and call back if questions.   Edema for months, getting more pronounced. 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   -- she noticed some on the L lower legs  -- the edema is more noticeable in the evening.  -- this am she walked more and the swelling is less  -- creatinine - normal  -- normal echo Aug 2019     Hemolytic anemia  --   Hemoglobin   Date Value Ref Range Status   03/16/2020 10.7 (L) 11.7 - 15.7 g/dL Final   ]     ROS:                                                      ROS: negative for fever, chills, cough, wheezes, chest pain, shortness of breath, vomiting, abdominal pain, pos for  leg swelling     OBJECTIVE:                                                    Physical Exam :    Blood pressure 137/73, pulse 99, temperature 98.3  F (36.8  C), temperature source Oral, resp. rate 19, height 1.829 m (6'), weight 70.6 kg (155 lb 9.6 oz), SpO2 99 %, not currently breastfeeding.   NAD, appears " comfortable  Skin: no rashes   Neck: supple, no JVD, No thyroidmegaly. Lymph nodes nonpalpable cervical and supraclavicular.  Chest: clear to auscultation bilaterally, good respiratory effort  Heart: S1 S2, RRR, no mgr appreciated  Abdomen: soft, not tender, no hepatosplenomegaly or masses appreciated, no abdominal bruit, present bowel sounds  Extremities: trace petibial edema, ankle edema more obvious on the right. No feet edema.   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, and posterior vitreous detachment     Urinary tract infection, site not specified     Recurrent UTI's      PSHx: reviewed  Past Surgical History:   Procedure Laterality Date     C LIGATE FALLOPIAN TUBE       COLONOSCOPY N/A 4/26/2016    Procedure: COLONOSCOPY;  Surgeon: Dontae Del Rio MD;  Location:  GI      COLONOSCOPY W BIOPSY  4/26/00     HC COLONOSCOPY W BIOPSY  10/8/03     HC COLONOSCOPY W BIOPSY  6/27/05    REPEAT IN 5 YEARS      CYSTOSCOPY,INSERT URETERAL STENT      Ureteral stent insertion      FLEX SIGMOIDOSCOPY W BIOPSY  5/30/00      LAPAROSCOPY, SURGICAL; CHOLECYSTECTOMY  1992      THYROIDECTOMY       LIGATION OF HEMORRHOID(S)      Hemorrhoidectomy     UPPER GI ENDOSCOPY,BIOPSY  10/01/01        Meds: reviewed  Current Outpatient Medications   Medication Sig Dispense Refill     cyanocobalamin (VITAMIN  B-12) 1000 MCG tablet   3     folic acid (FOLVITE) 1 MG tablet   3     hydrocortisone butyrate (LOCOID) 0.1 % SOLN solution Apply to scalp bid prn 60 mL 11     ketoconazole (NIZORAL) 2 % external shampoo Use  every other day to scalp as needed 120 mL 11     levothyroxine (SYNTHROID/LEVOTHROID) 150 MCG tablet Take 1 tablet (150 mcg) by mouth daily 90 tablet 3     potassium chloride ER (KLOR-CON M) 20 MEQ CR tablet Take 1 tablet (20 mEq) by mouth daily 90 tablet 3     sulfamethoxazole-trimethoprim (BACTRIM DS/SEPTRA DS) 800-160 MG tablet Take 1 pill orally on Mon, Wed and Friday 45 tablet 3     UNABLE TO FIND privigen inj monthly         Soc Hx: reviewed  Fam Hx: reviewed          Bozena Sanches MD  Internal Medicine

## 2020-06-24 NOTE — NURSING NOTE
/73   Pulse 99   Temp 98.3  F (36.8  C) (Oral)   Resp 19   Ht 1.829 m (6')   Wt 70.6 kg (155 lb 9.6 oz)   LMP  (LMP Unknown)   SpO2 99%   BMI 21.10 kg/m    Patient in for edema and redness in legs.  Montserrat Molina, MOHSEN

## 2020-06-24 NOTE — PATIENT INSTRUCTIONS
Plan:  1. Keeps the feet elevated when you rest  2. Hydrochlorothiazide 12.5 mg daily for a week then take it as needed  3. Compression socks  - knee high  4.  Labs today - [address]

## 2020-06-25 LAB
ALBUMIN SERPL-MCNC: 4.1 G/DL (ref 3.4–5)
ALP SERPL-CCNC: 110 U/L (ref 40–150)
ALT SERPL W P-5'-P-CCNC: 27 U/L (ref 0–50)
ANION GAP SERPL CALCULATED.3IONS-SCNC: 5 MMOL/L (ref 3–14)
AST SERPL W P-5'-P-CCNC: 50 U/L (ref 0–45)
BILIRUB SERPL-MCNC: 1.4 MG/DL (ref 0.2–1.3)
BUN SERPL-MCNC: 13 MG/DL (ref 7–30)
CALCIUM SERPL-MCNC: 8.3 MG/DL (ref 8.5–10.1)
CHLORIDE SERPL-SCNC: 107 MMOL/L (ref 94–109)
CO2 SERPL-SCNC: 27 MMOL/L (ref 20–32)
CREAT SERPL-MCNC: 0.72 MG/DL (ref 0.52–1.04)
GFR SERPL CREATININE-BSD FRML MDRD: 80 ML/MIN/{1.73_M2}
GLUCOSE SERPL-MCNC: 85 MG/DL (ref 70–99)
LDH SERPL L TO P-CCNC: 528 U/L (ref 81–234)
POTASSIUM SERPL-SCNC: 4.3 MMOL/L (ref 3.4–5.3)
PROT SERPL-MCNC: 6.4 G/DL (ref 6.8–8.8)
SODIUM SERPL-SCNC: 139 MMOL/L (ref 133–144)
VIT B12 SERPL-MCNC: 798 PG/ML (ref 193–986)

## 2020-06-28 LAB
HAPTOGLOB SERPL-MCNC: <3 MG/DL (ref 32–197)
IGA SERPL-MCNC: <2 MG/DL (ref 84–499)
IGG SERPL-MCNC: 402 MG/DL (ref 610–1616)
IGM SERPL-MCNC: 25 MG/DL (ref 35–242)

## 2020-07-03 ENCOUNTER — TELEPHONE (OUTPATIENT)
Dept: ONCOLOGY | Facility: CLINIC | Age: 80
End: 2020-07-03

## 2020-07-03 DIAGNOSIS — D83.9 CVID (COMMON VARIABLE IMMUNODEFICIENCY) (H): Primary | ICD-10-CM

## 2020-07-03 DIAGNOSIS — D59.19 OTHER AUTOIMMUNE HEMOLYTIC ANEMIAS: ICD-10-CM

## 2020-07-03 NOTE — TELEPHONE ENCOUNTER
Received call from pt at end of day and states she had labs done on 6/24/2020 and pt of Dr Summers and would like to discuss results.  Pt with hx of hemolytic anemia and IGA deficiency.  Informed pt that MD is out of the clinic but will see if he is back on Monday or discuss with another oncologist and call pt back with recommendations. Pt agrees with plan.    Tanya Piper RN, BSN, OCN

## 2020-07-06 NOTE — TELEPHONE ENCOUNTER
Dr Boyd reviewed pt's labs in absence of Dr Summers as noted below.    Her hemoglobin is a bit lower than before, with elevated bilirubin, LDH, reticulocyte count.   I think she has some mild hemolysis going on.       Let's have her repeat the labs early next week and follow-up with Dr. Summers.  She may need to start steroids, but since the anemia is not too bad, let's repeat next week and see how it trends.  Also, she meets criteria to get her IVIG infusion, which can also be scheduled next week    Let s repeat the cbc, retic, ldh, cmp.    Pt called as noted per Dr Boyd and informed pt that writer will have  call pt to arrange repeat labs in the next 1-2 days.  Reviewed recommendations as noted per Dr Boyd and pt states she completed last prednisone course in April, 2020.  Dr Summers will return tomorrow and will notify MD for further plan and appt and pt aware.    Tanya Piper, RN, BSN, OCN

## 2020-07-07 ENCOUNTER — HOSPITAL ENCOUNTER (OUTPATIENT)
Facility: CLINIC | Age: 80
Setting detail: SPECIMEN
Discharge: HOME | End: 2020-07-07
Attending: INTERNAL MEDICINE | Admitting: INTERNAL MEDICINE
Payer: MEDICARE

## 2020-07-07 DIAGNOSIS — D83.9 CVID (COMMON VARIABLE IMMUNODEFICIENCY) (H): ICD-10-CM

## 2020-07-07 DIAGNOSIS — E53.8 B12 DEFICIENCY: ICD-10-CM

## 2020-07-07 DIAGNOSIS — D59.19 OTHER AUTOIMMUNE HEMOLYTIC ANEMIAS: ICD-10-CM

## 2020-07-07 LAB
ALBUMIN SERPL-MCNC: 4.1 G/DL (ref 3.4–5)
ALP SERPL-CCNC: 110 U/L (ref 40–150)
ALT SERPL W P-5'-P-CCNC: 28 U/L (ref 0–50)
ANION GAP SERPL CALCULATED.3IONS-SCNC: 5 MMOL/L (ref 3–14)
AST SERPL W P-5'-P-CCNC: 53 U/L (ref 0–45)
BASOPHILS # BLD AUTO: 0 10E9/L (ref 0–0.2)
BASOPHILS NFR BLD AUTO: 0.2 %
BILIRUB SERPL-MCNC: 1.9 MG/DL (ref 0.2–1.3)
BUN SERPL-MCNC: 11 MG/DL (ref 7–30)
CALCIUM SERPL-MCNC: 8.2 MG/DL (ref 8.5–10.1)
CHLORIDE SERPL-SCNC: 106 MMOL/L (ref 94–109)
CO2 SERPL-SCNC: 28 MMOL/L (ref 20–32)
CREAT SERPL-MCNC: 0.73 MG/DL (ref 0.52–1.04)
DIFFERENTIAL METHOD BLD: ABNORMAL
EOSINOPHIL # BLD AUTO: 0.1 10E9/L (ref 0–0.7)
EOSINOPHIL NFR BLD AUTO: 1.8 %
ERYTHROCYTE [DISTWIDTH] IN BLOOD BY AUTOMATED COUNT: 14.6 % (ref 10–15)
GFR SERPL CREATININE-BSD FRML MDRD: 78 ML/MIN/{1.73_M2}
GLUCOSE SERPL-MCNC: 92 MG/DL (ref 70–99)
HCT VFR BLD AUTO: 30.7 % (ref 35–47)
HGB BLD-MCNC: 9.6 G/DL (ref 11.7–15.7)
IMM GRANULOCYTES # BLD: 0.1 10E9/L (ref 0–0.4)
IMM GRANULOCYTES NFR BLD: 2.4 %
LDH SERPL L TO P-CCNC: 540 U/L (ref 81–234)
LYMPHOCYTES # BLD AUTO: 1 10E9/L (ref 0.8–5.3)
LYMPHOCYTES NFR BLD AUTO: 22 %
MCH RBC QN AUTO: 32.8 PG (ref 26.5–33)
MCHC RBC AUTO-ENTMCNC: 31.3 G/DL (ref 31.5–36.5)
MCV RBC AUTO: 105 FL (ref 78–100)
MONOCYTES # BLD AUTO: 0.7 10E9/L (ref 0–1.3)
MONOCYTES NFR BLD AUTO: 14.7 %
NEUTROPHILS # BLD AUTO: 2.7 10E9/L (ref 1.6–8.3)
NEUTROPHILS NFR BLD AUTO: 58.9 %
NRBC # BLD AUTO: 0 10*3/UL
NRBC BLD AUTO-RTO: 0 /100
PLATELET # BLD AUTO: 135 10E9/L (ref 150–450)
POTASSIUM SERPL-SCNC: 4 MMOL/L (ref 3.4–5.3)
PROT SERPL-MCNC: 6.4 G/DL (ref 6.8–8.8)
RBC # BLD AUTO: 2.93 10E12/L (ref 3.8–5.2)
RETICS # AUTO: 214.5 10E9/L (ref 25–95)
RETICS/RBC NFR AUTO: 7.3 % (ref 0.5–2)
SODIUM SERPL-SCNC: 139 MMOL/L (ref 133–144)
VIT B12 SERPL-MCNC: 846 PG/ML (ref 193–986)
WBC # BLD AUTO: 4.5 10E9/L (ref 4–11)

## 2020-07-07 PROCEDURE — 82784 ASSAY IGA/IGD/IGG/IGM EACH: CPT | Performed by: INTERNAL MEDICINE

## 2020-07-07 PROCEDURE — 83010 ASSAY OF HAPTOGLOBIN QUANT: CPT | Performed by: INTERNAL MEDICINE

## 2020-07-07 PROCEDURE — 83615 LACTATE (LD) (LDH) ENZYME: CPT | Performed by: INTERNAL MEDICINE

## 2020-07-07 PROCEDURE — 85025 COMPLETE CBC W/AUTO DIFF WBC: CPT | Performed by: INTERNAL MEDICINE

## 2020-07-07 PROCEDURE — 80053 COMPREHEN METABOLIC PANEL: CPT | Performed by: INTERNAL MEDICINE

## 2020-07-07 PROCEDURE — 36415 COLL VENOUS BLD VENIPUNCTURE: CPT

## 2020-07-07 PROCEDURE — 85045 AUTOMATED RETICULOCYTE COUNT: CPT | Performed by: INTERNAL MEDICINE

## 2020-07-07 PROCEDURE — 82607 VITAMIN B-12: CPT | Performed by: INTERNAL MEDICINE

## 2020-07-08 LAB
HAPTOGLOB SERPL-MCNC: <3 MG/DL (ref 32–197)
IGA SERPL-MCNC: <2 MG/DL (ref 84–499)
IGG SERPL-MCNC: 350 MG/DL (ref 610–1616)
IGM SERPL-MCNC: 21 MG/DL (ref 35–242)

## 2020-07-10 NOTE — TELEPHONE ENCOUNTER
Dr Summers notified and pt ;abs reviewed. hgb stable.  Recommend pt return to see him or PA in the clinic and IVIG.  Pt called and instructed as noted.  Pt states she is concerned about all labs including retic count and states she is a bit more sjort of breath with activity and does not want hgb to fall again.  Pt would like to schedule new appointment with Dr Boyd for second opinion.  Pt informed that writer will reach out to Dr Boyd regarding new consult and then will call pt back with plan.  Pt verbalized understanding of plan.    Discussed message with Dr Oliver Mendoza's RN Care Coordinator.    Tanya Piper, RN, BSN, OCN

## 2020-07-20 ENCOUNTER — PATIENT OUTREACH (OUTPATIENT)
Dept: ONCOLOGY | Facility: CLINIC | Age: 80
End: 2020-07-20

## 2020-07-20 ENCOUNTER — TELEPHONE (OUTPATIENT)
Dept: ONCOLOGY | Facility: CLINIC | Age: 80
End: 2020-07-20

## 2020-07-20 ENCOUNTER — HOSPITAL ENCOUNTER (OUTPATIENT)
Facility: CLINIC | Age: 80
Setting detail: SPECIMEN
Discharge: HOME | End: 2020-07-20
Attending: INTERNAL MEDICINE | Admitting: INTERNAL MEDICINE
Payer: MEDICARE

## 2020-07-20 DIAGNOSIS — D59.19 OTHER AUTOIMMUNE HEMOLYTIC ANEMIAS: Primary | ICD-10-CM

## 2020-07-20 DIAGNOSIS — D83.9 CVID (COMMON VARIABLE IMMUNODEFICIENCY) (H): Primary | ICD-10-CM

## 2020-07-20 DIAGNOSIS — D59.19 OTHER AUTOIMMUNE HEMOLYTIC ANEMIAS: ICD-10-CM

## 2020-07-20 DIAGNOSIS — E53.8 B12 DEFICIENCY: ICD-10-CM

## 2020-07-20 DIAGNOSIS — D83.9 CVID (COMMON VARIABLE IMMUNODEFICIENCY) (H): ICD-10-CM

## 2020-07-20 LAB
ALBUMIN SERPL-MCNC: 3.9 G/DL (ref 3.4–5)
ALP SERPL-CCNC: 100 U/L (ref 40–150)
ALT SERPL W P-5'-P-CCNC: 26 U/L (ref 0–50)
ANION GAP SERPL CALCULATED.3IONS-SCNC: 3 MMOL/L (ref 3–14)
ANISOCYTOSIS BLD QL SMEAR: ABNORMAL
AST SERPL W P-5'-P-CCNC: 77 U/L (ref 0–45)
BASOPHILS # BLD AUTO: 0.1 10E9/L (ref 0–0.2)
BASOPHILS NFR BLD AUTO: 1 %
BILIRUB SERPL-MCNC: 3.9 MG/DL (ref 0.2–1.3)
BUN SERPL-MCNC: 14 MG/DL (ref 7–30)
CALCIUM SERPL-MCNC: 8 MG/DL (ref 8.5–10.1)
CHLORIDE SERPL-SCNC: 107 MMOL/L (ref 94–109)
CO2 SERPL-SCNC: 27 MMOL/L (ref 20–32)
CREAT SERPL-MCNC: 0.72 MG/DL (ref 0.52–1.04)
DIFFERENTIAL METHOD BLD: ABNORMAL
EOSINOPHIL # BLD AUTO: 0.1 10E9/L (ref 0–0.7)
EOSINOPHIL NFR BLD AUTO: 1 %
ERYTHROCYTE [DISTWIDTH] IN BLOOD BY AUTOMATED COUNT: 22.7 % (ref 10–15)
GFR SERPL CREATININE-BSD FRML MDRD: 80 ML/MIN/{1.73_M2}
GLUCOSE SERPL-MCNC: 109 MG/DL (ref 70–99)
HCT VFR BLD AUTO: 18.8 % (ref 35–47)
HGB BLD-MCNC: 6.1 G/DL (ref 11.7–15.7)
LDH SERPL L TO P-CCNC: 789 U/L (ref 81–234)
LYMPHOCYTES # BLD AUTO: 0.5 10E9/L (ref 0.8–5.3)
LYMPHOCYTES NFR BLD AUTO: 7 %
MACROCYTES BLD QL SMEAR: PRESENT
MCH RBC QN AUTO: 35.9 PG (ref 26.5–33)
MCHC RBC AUTO-ENTMCNC: 32.4 G/DL (ref 31.5–36.5)
MCV RBC AUTO: 111 FL (ref 78–100)
METAMYELOCYTES # BLD: 0.3 10E9/L
METAMYELOCYTES NFR BLD MANUAL: 4 %
MONOCYTES # BLD AUTO: 0.5 10E9/L (ref 0–1.3)
MONOCYTES NFR BLD AUTO: 7 %
MYELOCYTES # BLD: 0.3 10E9/L
MYELOCYTES NFR BLD MANUAL: 4 %
NEUTROPHILS # BLD AUTO: 5.3 10E9/L (ref 1.6–8.3)
NEUTROPHILS NFR BLD AUTO: 76 %
NRBC # BLD AUTO: 0.1 10*3/UL
NRBC BLD AUTO-RTO: 1 /100
PLATELET # BLD AUTO: 126 10E9/L (ref 150–450)
PLATELET # BLD EST: ABNORMAL 10*3/UL
POLYCHROMASIA BLD QL SMEAR: ABNORMAL
POTASSIUM SERPL-SCNC: 4.3 MMOL/L (ref 3.4–5.3)
PROT SERPL-MCNC: 5.8 G/DL (ref 6.8–8.8)
RBC # BLD AUTO: 1.7 10E12/L (ref 3.8–5.2)
RETICS # AUTO: 284.1 10E9/L (ref 25–95)
RETICS/RBC NFR AUTO: 17.5 % (ref 0.5–2)
SODIUM SERPL-SCNC: 137 MMOL/L (ref 133–144)
VIT B12 SERPL-MCNC: 749 PG/ML (ref 193–986)
WBC # BLD AUTO: 7 10E9/L (ref 4–11)

## 2020-07-20 PROCEDURE — 36415 COLL VENOUS BLD VENIPUNCTURE: CPT

## 2020-07-20 PROCEDURE — 82784 ASSAY IGA/IGD/IGG/IGM EACH: CPT | Performed by: INTERNAL MEDICINE

## 2020-07-20 PROCEDURE — 83615 LACTATE (LD) (LDH) ENZYME: CPT | Performed by: INTERNAL MEDICINE

## 2020-07-20 PROCEDURE — 85045 AUTOMATED RETICULOCYTE COUNT: CPT | Performed by: INTERNAL MEDICINE

## 2020-07-20 PROCEDURE — 80053 COMPREHEN METABOLIC PANEL: CPT | Performed by: INTERNAL MEDICINE

## 2020-07-20 PROCEDURE — 82607 VITAMIN B-12: CPT | Performed by: INTERNAL MEDICINE

## 2020-07-20 PROCEDURE — 85025 COMPLETE CBC W/AUTO DIFF WBC: CPT | Performed by: INTERNAL MEDICINE

## 2020-07-20 PROCEDURE — 83010 ASSAY OF HAPTOGLOBIN QUANT: CPT | Performed by: INTERNAL MEDICINE

## 2020-07-20 RX ORDER — PREDNISONE 20 MG/1
60 TABLET ORAL DAILY
Qty: 90 TABLET | Refills: 0 | Status: SHIPPED | OUTPATIENT
Start: 2020-07-20 | End: 2020-11-13

## 2020-07-20 NOTE — PROGRESS NOTES
Per Dr. Boyd, she would like her labs repeated. Draw the same labs at drawn on 7/7/20.    Writer called Tricia. She is able to come in to clinic today at 12:15pm for labs with the MA.    Anabell Hawkins RN, BSN, [facility]  Patient Care Coordinator  [facility]  [phone]

## 2020-07-20 NOTE — PROGRESS NOTES
Writer called and spoke with Tricia. Writer explained Dr. Boyd's message and her hgb is:     Results for TRICIA MARRUFO (MRN [id number]) as of 7/20/2020 13:42   Ref. Range 7/20/2020 12:11   Hemoglobin Latest Ref Range: 11.7 - 15.7 g/dL 6.1 (LL)     Tricia reports she is only short of breath when walking across a room but denies dizziness, light-headedness, SOB at rest, or chest pain.     Tricia understands she should start the Prednisone today and if she has any worsening of symptoms prior to Thursday, she needs to call and notify us. Tricia would like to come in for repeat labs, Thursday at 10:30am prior to the telephone visit with Dr. Boyd.     Writer will notify the schedulers.    Anabell Hawkins RN, BSN, MAOM  Patient Care Coordinator  [facility]  [phone]

## 2020-07-20 NOTE — PROGRESS NOTES
S-(situation): Writer received a critical value from [facility]:     Hgb today came back at 6.1    B-(background):     A-(assessment):     R-(recommendations): Writer will notify Dr. Boyd.       Anabell Hawkins, RN, BSN, [facility]  Patient Care Coordinator  [facility]  [phone]

## 2020-07-20 NOTE — TELEPHONE ENCOUNTER
Please see other patient outreach encounter from author from today.    Anabell Hawkins RN, BSN, MAOM  Patient Care Coordinator  [facility]  [phone]

## 2020-07-20 NOTE — PROGRESS NOTES
"S-(situation): Tricia called in to clinic reporting she is transferring care from Dr. Summers to Dr. Boyd on this Thursday, 7/23.     She reports she has hemolytic anemia and feels like her symptoms are getting worse. She is wondering if she can get labs drawn this week prior to the Thursday appt so Dr. Boyd has more up-to-date labs.    B-(background): Tricia was last seen by Dr. Summers on 3/16/20. Her most recent sent of labs are from 7/7/20    A-(assessment): Tricia reports she is having trouble with \"walking across the room\" and feeling short of breath.     She denies feeling SOB at rest, dizziness, light-headed, chest pain.     R-(recommendations): Tricia reports she is available today to come in for labs. Writer will check with Dr. Boyd to see if she would like labs drawn this week prior to Thursday's appointment then call the patient back.      Anabell Hawkins, RN, BSN, [facility]  Patient Care Coordinator  [facility]  [phone]      "

## 2020-07-20 NOTE — PROGRESS NOTES
Sabina Boyd MD  You;  Cancer Clinic Rn Pool 9 minutes ago (12:53 PM)       Patient is having exacerbation of hemolytic anemia.  I'm going to send prednisone to her pharmacy.  She should take it with food.  Please have her start it today.  Let's have her repeat a CBC, CMP, LDH and reticulocyte count the morning of my visit on Thursday to make sure her labs are stable.     If she has chest pain, shortness of breath, syncope, bleeding symptoms, she needs to go to the ED.    Message text        Writer called and left a voicemail to have Tricia return my call regarding her labs that were done today.     Anabell Hawkins, RN, BSN, MAOM  Patient Care Coordinator  [facility]  [phone]

## 2020-07-20 NOTE — TELEPHONE ENCOUNTER
Patient returning phone call to discuss prescription. Would like a call back to discuss.    Tricia Marshall CMA on 7/20/2020 at 1:26 PM

## 2020-07-21 LAB
HAPTOGLOB SERPL-MCNC: <3 MG/DL (ref 32–197)
IGA SERPL-MCNC: <2 MG/DL (ref 84–499)
IGG SERPL-MCNC: 318 MG/DL (ref 610–1616)
IGM SERPL-MCNC: 21 MG/DL (ref 35–242)

## 2020-07-23 ENCOUNTER — VIRTUAL VISIT (OUTPATIENT)
Dept: ONCOLOGY | Facility: CLINIC | Age: 80
End: 2020-07-23
Attending: INTERNAL MEDICINE
Payer: MEDICARE

## 2020-07-23 ENCOUNTER — HOSPITAL ENCOUNTER (OUTPATIENT)
Facility: CLINIC | Age: 80
Setting detail: SPECIMEN
Discharge: HOME | End: 2020-07-23
Attending: INTERNAL MEDICINE | Admitting: INTERNAL MEDICINE
Payer: MEDICARE

## 2020-07-23 DIAGNOSIS — D59.19 OTHER AUTOIMMUNE HEMOLYTIC ANEMIAS: Primary | ICD-10-CM

## 2020-07-23 DIAGNOSIS — D59.19 OTHER AUTOIMMUNE HEMOLYTIC ANEMIAS: ICD-10-CM

## 2020-07-23 DIAGNOSIS — D83.9 CVID (COMMON VARIABLE IMMUNODEFICIENCY) (H): ICD-10-CM

## 2020-07-23 DIAGNOSIS — E53.8 B12 DEFICIENCY: ICD-10-CM

## 2020-07-23 LAB
ALBUMIN SERPL-MCNC: 4.5 G/DL (ref 3.4–5)
ALP SERPL-CCNC: 107 U/L (ref 40–150)
ALT SERPL W P-5'-P-CCNC: 32 U/L (ref 0–50)
ANION GAP SERPL CALCULATED.3IONS-SCNC: 6 MMOL/L (ref 3–14)
ANISOCYTOSIS BLD QL SMEAR: ABNORMAL
AST SERPL W P-5'-P-CCNC: 69 U/L (ref 0–45)
BASOPHILS # BLD AUTO: 0 10E9/L (ref 0–0.2)
BASOPHILS NFR BLD AUTO: 0 %
BILIRUB SERPL-MCNC: 3.9 MG/DL (ref 0.2–1.3)
BUN SERPL-MCNC: 23 MG/DL (ref 7–30)
CALCIUM SERPL-MCNC: 8.5 MG/DL (ref 8.5–10.1)
CHLORIDE SERPL-SCNC: 105 MMOL/L (ref 94–109)
CO2 SERPL-SCNC: 25 MMOL/L (ref 20–32)
CREAT SERPL-MCNC: 0.79 MG/DL (ref 0.52–1.04)
DIFFERENTIAL METHOD BLD: ABNORMAL
EOSINOPHIL # BLD AUTO: 0.1 10E9/L (ref 0–0.7)
EOSINOPHIL NFR BLD AUTO: 1 %
ERYTHROCYTE [DISTWIDTH] IN BLOOD BY AUTOMATED COUNT: 26.8 % (ref 10–15)
GFR SERPL CREATININE-BSD FRML MDRD: 71 ML/MIN/{1.73_M2}
GLUCOSE SERPL-MCNC: 75 MG/DL (ref 70–99)
HCT VFR BLD AUTO: 22.2 % (ref 35–47)
HGB BLD-MCNC: 7 G/DL (ref 11.7–15.7)
LDH SERPL L TO P-CCNC: 876 U/L (ref 81–234)
LYMPHOCYTES # BLD AUTO: 1.4 10E9/L (ref 0.8–5.3)
LYMPHOCYTES NFR BLD AUTO: 16 %
MACROCYTES BLD QL SMEAR: PRESENT
MCH RBC QN AUTO: 37 PG (ref 26.5–33)
MCHC RBC AUTO-ENTMCNC: 31.5 G/DL (ref 31.5–36.5)
MCV RBC AUTO: 118 FL (ref 78–100)
MONOCYTES # BLD AUTO: 0.9 10E9/L (ref 0–1.3)
MONOCYTES NFR BLD AUTO: 11 %
MYELOCYTES # BLD: 0.3 10E9/L
MYELOCYTES NFR BLD MANUAL: 4 %
NEUTROPHILS # BLD AUTO: 5.7 10E9/L (ref 1.6–8.3)
NEUTROPHILS NFR BLD AUTO: 67 %
PLATELET # BLD AUTO: 178 10E9/L (ref 150–450)
PLATELET # BLD EST: ABNORMAL 10*3/UL
POLYCHROMASIA BLD QL SMEAR: SLIGHT
POTASSIUM SERPL-SCNC: 3.6 MMOL/L (ref 3.4–5.3)
PROMYELOCYTES # BLD MANUAL: 0.1 10E9/L
PROMYELOCYTES NFR BLD MANUAL: 1 %
PROT SERPL-MCNC: 6.6 G/DL (ref 6.8–8.8)
RBC # BLD AUTO: 1.89 10E12/L (ref 3.8–5.2)
RETICS # AUTO: 545.4 10E9/L (ref 25–95)
RETICS/RBC NFR AUTO: 29.8 % (ref 0.5–2)
SODIUM SERPL-SCNC: 136 MMOL/L (ref 133–144)
VIT B12 SERPL-MCNC: 950 PG/ML (ref 193–986)
WBC # BLD AUTO: 8.5 10E9/L (ref 4–11)

## 2020-07-23 PROCEDURE — 80053 COMPREHEN METABOLIC PANEL: CPT | Performed by: INTERNAL MEDICINE

## 2020-07-23 PROCEDURE — 36415 COLL VENOUS BLD VENIPUNCTURE: CPT

## 2020-07-23 PROCEDURE — 83615 LACTATE (LD) (LDH) ENZYME: CPT | Performed by: INTERNAL MEDICINE

## 2020-07-23 PROCEDURE — 99442 ZZC PHYSICIAN TELEPHONE EVALUATION 11-20 MIN: CPT | Performed by: INTERNAL MEDICINE

## 2020-07-23 PROCEDURE — 82784 ASSAY IGA/IGD/IGG/IGM EACH: CPT | Performed by: INTERNAL MEDICINE

## 2020-07-23 PROCEDURE — 83010 ASSAY OF HAPTOGLOBIN QUANT: CPT | Performed by: INTERNAL MEDICINE

## 2020-07-23 PROCEDURE — 82607 VITAMIN B-12: CPT | Performed by: INTERNAL MEDICINE

## 2020-07-23 PROCEDURE — 40001009 ZZH VIDEO/TELEPHONE VISIT; NO CHARGE

## 2020-07-23 PROCEDURE — 85025 COMPLETE CBC W/AUTO DIFF WBC: CPT | Performed by: INTERNAL MEDICINE

## 2020-07-23 PROCEDURE — 85045 AUTOMATED RETICULOCYTE COUNT: CPT | Performed by: INTERNAL MEDICINE

## 2020-07-23 RX ORDER — TRIAMCINOLONE ACETONIDE 1 MG/G
CREAM TOPICAL
COMMUNITY
Start: 2020-07-20 | End: 2022-06-24

## 2020-07-23 NOTE — LETTER
"    7/23/2020         RE: Tricia Sosa  [address]  [place] [address]        Dear Colleague,    Thank you for referring your patient, Tricia Sosa, to the [facility]. Please see a copy of my visit note below.    Tricia Sosa is a 79 year old female who is being evaluated via a billable telephone visit.      The patient has been notified of following:     \"This telephone visit will be conducted via a call between you and your physician/provider. We have found that certain health care needs can be provided without the need for a physical exam.  This service lets us provide the care you need with a short phone conversation.  If a prescription is necessary we can send it directly to your pharmacy.  If lab work is needed we can place an order for that and you can then stop by our lab to have the test done at a later time.    Telephone visits are billed at different rates depending on your insurance coverage. During this emergency period, for some insurers they may be billed the same as an in-person visit.  Please reach out to your insurance provider with any questions.    If during the course of the call the physician/provider feels a telephone visit is not appropriate, you will not be charged for this service.\"    Patient has given verbal consent for Telephone visit?  Yes    What phone number would you like to be contacted at? [phone]    How would you like to obtain your AVS? Dax Marshall CMA on 7/23/2020 at 1:22 PM      [facility]    Hematology/Oncology Established Patient Follow-up Note      Today's Date: 07/23/20    Reason for Follow-up: Hemolytic anemia-autoimmune; IgA deficiency    HISTORY OF PRESENT ILLNESS: Tricia Sosa is a 79 year old female who presents with autoimmune hemolytic anemia and IgA deficiency.  She previously saw Dr. Matos and then Dr. Summers.  She was previously seen at [facility].    TREATMENT SUMMARY:  Tricia has been " diagnosed with IgA deficiency since her around 2000.  She was very sick at the time and had severe diarrhea.  She lost about 40 pounds in weight.  The workup revealed that she had markedly diminished CD4 counts of 48 and was diagnosed with CMV colitis.  Since then she has been followed in hematology clinic at [place] until recently.  She was noted to have anemia which was fairly long-standing.  She was initially referred for anemia in 2004 and also had a bone marrow biopsy done at that time which revealed hypercellular bone marrow with 70-80% cellularity and normal trilineage hematopoiesis and no morphologic features of MDS or lymphoproliferation.  Her anemia was attributed to her chronic underlying disease.  At the next evaluation in 2002 on 4 aggressive anemia there was no evidence of hemolysis, iron deficiency, B12 or folate deficiency.  Bone marrow biopsy was not pursued.  In summer of 2015 she had progressive fatigue, dyspnea on exertion and worsening anemia with a hemoglobin now of 9.  Workup at this time did suggest a hemolytic anemia with elevated LDH at 709, undetectable haptoglobin and elevated unconjugated bilirubin at 3.3.  Monospecific MARIKA was positive for both IgG and complement.  Cold agglutinin screen was positive with very low titer 1:64.  She was started on prednisone 60 mg daily with supplementation of iron folate and B12 starting 7/31/15.  Her prednisone has been slowly tapered and was discontinued off in January 2016.  She was last followed at [facility] on March 10, 2016 when she had stable hemoglobin.    She was followed by Dr. Matos and Dr. Summers.  Due to relapse of hemolytic anemia, she underwent another course of prednisone that has since been tapered off and rituximab x 4 weekly doses completed in 9/19/19.        INTERIM HISTORY: Tricia has called the clinic because she was feeling quite tired and short of breath.  On 7/20/20, her hemoglobin was down to 6.1, with elevated bilirubin, reticulocyte  count, LDH.  She was started on prednisone 60 mg daily.  She says that she felt much better by the next day with more energy.  She had some trouble sleeping though.        REVIEW OF SYSTEMS:   14 point ROS was reviewed and is negative other than as noted above in HPI.       HOME MEDICATIONS:  Current Outpatient Medications   Medication Sig Dispense Refill     cyanocobalamin (VITAMIN  B-12) 1000 MCG tablet   3     folic acid (FOLVITE) 1 MG tablet   3     hydrochlorothiazide (HYDRODIURIL) 12.5 MG tablet Take 1 tablet (12.5 mg) by mouth daily 30 tablet 1     ketoconazole (NIZORAL) 2 % external shampoo Use every other day to scalp as needed 120 mL 11     levothyroxine (SYNTHROID/LEVOTHROID) 150 MCG tablet Take 1 tablet (150 mcg) by mouth daily 90 tablet 3     predniSONE (DELTASONE) 20 MG tablet Take 3 tablets (60 mg) by mouth daily 90 tablet 0     sulfamethoxazole-trimethoprim (BACTRIM DS/SEPTRA DS) 800-160 MG tablet Take 1 pill orally on Mon, Wed and Friday 45 tablet 3     triamcinolone (KENALOG) 0.1 % external cream        UNABLE TO FIND privigen inj monthly       hydrocortisone butyrate (LOCOID) 0.1 % SOLN solution Apply to scalp bid prn (Patient not taking: Reported on 7/23/2020) 60 mL 11     potassium chloride ER (KLOR-CON M) 20 MEQ CR tablet Take 1 tablet (20 mEq) by mouth daily (Patient not taking: Reported on 7/23/2020) 90 tablet 3         ALLERGIES:  Allergies   Allergen Reactions     Doxycycline          PAST MEDICAL HISTORY:  Past Medical History:   Diagnosis Date     WARD (dyspnea on exertion)      External hemorrhoids without mention of complication 6/27/05     Hemolytic anemia (H)     autoimmune     Hypothyroidism      IgA deficiency (H)      Other malaise and fatigue      Other severe protein-calorie malnutrition      Thyroid disease      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, and posterior vitreous detachment     Urinary tract infection, site not specified     Recurrent  UTI's         PAST SURGICAL HISTORY:  Past Surgical History:   Procedure Laterality Date     C LIGATE FALLOPIAN TUBE       COLONOSCOPY N/A 4/26/2016    Procedure: COLONOSCOPY;  Surgeon: Dontae Del Rio MD;  Location: [facility]     HC COLONOSCOPY W BIOPSY  4/26/00     HC COLONOSCOPY W BIOPSY  10/8/03     HC COLONOSCOPY W BIOPSY  6/27/05    REPEAT IN 5 YEARS     HC CYSTOSCOPY,INSERT URETERAL STENT      Ureteral stent insertion     HC FLEX SIGMOIDOSCOPY W BIOPSY  5/30/00      LAPAROSCOPY, SURGICAL; CHOLECYSTECTOMY  1992      THYROIDECTOMY       LIGATION OF HEMORRHOID(S)      Hemorrhoidectomy     UPPER GI ENDOSCOPY,BIOPSY  10/01/01         SOCIAL HISTORY:  Social History     Socioeconomic History     Marital status:      Spouse name: Not on file     Number of children: Not on file     Years of education: Not on file     Highest education level: Not on file   Occupational History     Not on file   Social Needs     Financial resource strain: Not on file     Food insecurity     Worry: Not on file     Inability: Not on file     Transportation needs     Medical: Not on file     Non-medical: Not on file   Tobacco Use     Smoking status: Never Smoker     Smokeless tobacco: Never Used   Substance and Sexual Activity     Alcohol use: No     Alcohol/week: 0.0 standard drinks     Frequency: Never     Drug use: No     Sexual activity: Never   Lifestyle     Physical activity     Days per week: Not on file     Minutes per session: Not on file     Stress: Not on file   Relationships     Social connections     Talks on phone: Not on file     Gets together: Not on file     Attends Anabaptism service: Not on file     Active member of club or organization: Not on file     Attends meetings of clubs or organizations: Not on file     Relationship status: Not on file     Intimate partner violence     Fear of current or ex partner: Not on file     Emotionally abused: Not on file     Physically abused: Not on file     Forced sexual  activity: Not on file   Other Topics Concern     Parent/sibling w/ CABG, MI or angioplasty before 65F 55M? Not Asked   Social History Narrative     Not on file         FAMILY HISTORY:  Family History   Problem Relation Age of Onset     Colon Cancer Mother 90     Cerebrovascular Disease Father          at age 85     Dementia Brother      Dementia Brother      Pancreatic Cancer Brother      Breast Cancer Sister          PHYSICAL EXAM:  Vital signs:  LMP  (LMP Unknown)    Phone visit.      LABS:  CBC RESULTS:   Recent Labs   Lab Test 20  1045   WBC 8.5   RBC 1.89*   HGB 7.0*   HCT 22.2*   *   MCH 37.0*   MCHC 31.5   RDW 26.8*          Recent Labs   Lab Test 20  1045 20  1211    137   POTASSIUM 3.6 4.3   CHLORIDE 105 107   CO2 25 27   ANIONGAP 6 3   GLC 75 109*   BUN 23 14   CR 0.79 0.72   CULLEN 8.5 8.0*     Lab Results   Component Value Date    AST 69 2020     Lab Results   Component Value Date    ALT 32 2020     No results found for: BILICONJ   Lab Results   Component Value Date    BILITOTAL 3.9 2020     Lab Results   Component Value Date    ALBUMIN 4.5 2020     Lab Results   Component Value Date    PROTTOTAL 6.6 2020      Lab Results   Component Value Date    ALKPHOS 107 2020     Component      Latest Ref Rng & Units 2020          12:11 PM 12:11 PM 12:11 PM 10:45 AM   % Retic      0.5 - 2.0 %       Absolute Retic      25 - 95 10e9/L       IGA      84 - 499 mg/dL <2 (L)      IGG      610 - 1,616 mg/dL  318 (L)     IGM      35 - 242 mg/dL   21 (L)    Lactate Dehydrogenase      81 - 234 U/L    876 (H)     Component      Latest Ref Rng & Units 2020          10:45 AM   % Retic      0.5 - 2.0 % 29.8 (H)   Absolute Retic      25 - 95 10e9/L 545.4 (H)   IGA      84 - 499 mg/dL    IGG      610 - 1,616 mg/dL    IGM      35 - 242 mg/dL    Lactate Dehydrogenase      81 - 234 U/L          ASSESSMENT/PLAN:  Tricia PACHECO  Ian is a 79 year old female with:      1. 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 Common variable immunodeficiency disorder  4. Splenomegaly        1) Autoimmune hemolytic anemia: Hemoglobin decreased to 6.1 earlier this week, with worsening reticulocyte count, LDH, bilirubin.  She was started on prednisone 60 mg daily.  Hemoglobin improved to 7.0 today, and she is feeling much better.  Her other labs are still up though, indicating ongoing hemolysis.  I discussed adding rituximab as well.  However, she says that she received that in September 2019 and it didn't really help.  Since the hemoglobin is improving, we will monitor the labs closely.  If it worsens or no improvement, we may need to add rituximab.  We will proceed with her IVIG next week though.  She usually receives it monthly, but has not had it since March 2020 due to COVID pandemic.  She is taking folic acid.    We also talked about other treatment options if her disease becomes refractory to steroids, such as splenectomy.      -continue prednisone 60 mg daily  -IVIG early next week  -weekly labs for now to make sure hemolysis is getting better  -follow-up with me in 1 month    2) CVID: Patient wanted her ESR and CD4 level checked since it hasn't been checked in a while.    -continue IVIG, as above.  She gets it monthlh          Sabina Boyd MD  Hematology/Oncology  [facility]        Phone call duration: 15 minutes          Again, thank you for allowing me to participate in the care of your patient.        Sincerely,        Sabina Boyd MD

## 2020-07-23 NOTE — PROGRESS NOTES
"Tricia Sosa is a 79 year old female who is being evaluated via a billable telephone visit.      The patient has been notified of following:     \"This telephone visit will be conducted via a call between you and your physician/provider. We have found that certain health care needs can be provided without the need for a physical exam.  This service lets us provide the care you need with a short phone conversation.  If a prescription is necessary we can send it directly to your pharmacy.  If lab work is needed we can place an order for that and you can then stop by our lab to have the test done at a later time.    Telephone visits are billed at different rates depending on your insurance coverage. During this emergency period, for some insurers they may be billed the same as an in-person visit.  Please reach out to your insurance provider with any questions.    If during the course of the call the physician/provider feels a telephone visit is not appropriate, you will not be charged for this service.\"    Patient has given verbal consent for Telephone visit?  Yes    What phone number would you like to be contacted at? [phone]    How would you like to obtain your AVS? Dax Marshall CMA on 7/23/2020 at 1:22 PM      [facility]    Hematology/Oncology Established Patient Follow-up Note      Today's Date: 07/23/20    Reason for Follow-up: Hemolytic anemia-autoimmune; IgA deficiency    HISTORY OF PRESENT ILLNESS: Tricia Sosa is a 79 year old female who presents with autoimmune hemolytic anemia and IgA deficiency.  She previously saw Dr. Matos and then Dr. Summers.  She was previously seen at [facility].    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  She was very sick at the time and had severe diarrhea.  She lost about 40 pounds in weight.  The workup revealed that she had markedly diminished CD4 counts of 48 and was diagnosed with CMV " colitis.  Since then she has been followed in hematology clinic at [place] until recently.  She was noted to have anemia which was fairly long-standing.  She was initially referred for anemia in 2004 and also had a bone marrow biopsy done at that time which revealed hypercellular bone marrow with 70-80% cellularity and normal trilineage hematopoiesis and no morphologic features of MDS or lymphoproliferation.  Her anemia was attributed to her chronic underlying disease.  At the next evaluation in 2002 on 4 aggressive anemia there was no evidence of hemolysis, iron deficiency, B12 or folate deficiency.  Bone marrow biopsy was not pursued.  In summer of 2015 she had progressive fatigue, dyspnea on exertion and worsening anemia with a hemoglobin now of 9.  Workup at this time did suggest a hemolytic anemia with elevated LDH at 709, undetectable haptoglobin and elevated unconjugated bilirubin at 3.3.  Monospecific MARIKA was positive for both IgG and complement.  Cold agglutinin screen was positive with very low titer 1:64.  She was started on prednisone 60 mg daily with supplementation of iron folate and B12 starting 7/31/15.  Her prednisone has been slowly tapered and was discontinued off in January 2016.  She was last followed at [facility] on March 10, 2016 when she had stable hemoglobin.    She was followed by Dr. Matos and Dr. Summers.  Due to relapse of hemolytic anemia, she underwent another course of prednisone that has since been tapered off and rituximab x 4 weekly doses completed in 9/19/19.        INTERIM HISTORY: Tricia has called the clinic because she was feeling quite tired and short of breath.  On 7/20/20, her hemoglobin was down to 6.1, with elevated bilirubin, reticulocyte count, LDH.  She was started on prednisone 60 mg daily.  She says that she felt much better by the next day with more energy.  She had some trouble sleeping though.        REVIEW OF SYSTEMS:   14 point ROS was reviewed and is negative other  than as noted above in HPI.       HOME MEDICATIONS:  Current Outpatient Medications   Medication Sig Dispense Refill     cyanocobalamin (VITAMIN  B-12) 1000 MCG tablet   3     folic acid (FOLVITE) 1 MG tablet   3     hydrochlorothiazide (HYDRODIURIL) 12.5 MG tablet Take 1 tablet (12.5 mg) by mouth daily 30 tablet 1     ketoconazole (NIZORAL) 2 % external shampoo Use every other day to scalp as needed 120 mL 11     levothyroxine (SYNTHROID/LEVOTHROID) 150 MCG tablet Take 1 tablet (150 mcg) by mouth daily 90 tablet 3     predniSONE (DELTASONE) 20 MG tablet Take 3 tablets (60 mg) by mouth daily 90 tablet 0     sulfamethoxazole-trimethoprim (BACTRIM DS/SEPTRA DS) 800-160 MG tablet Take 1 pill orally on Mon, Wed and Friday 45 tablet 3     triamcinolone (KENALOG) 0.1 % external cream        UNABLE TO FIND privigen inj monthly       hydrocortisone butyrate (LOCOID) 0.1 % SOLN solution Apply to scalp bid prn (Patient not taking: Reported on 7/23/2020) 60 mL 11     potassium chloride ER (KLOR-CON M) 20 MEQ CR tablet Take 1 tablet (20 mEq) by mouth daily (Patient not taking: Reported on 7/23/2020) 90 tablet 3         ALLERGIES:  Allergies   Allergen Reactions     Doxycycline          PAST MEDICAL HISTORY:  Past Medical History:   Diagnosis Date     WARD (dyspnea on exertion)      External hemorrhoids without mention of complication 6/27/05     Hemolytic anemia (H)     autoimmune     Hypothyroidism      IgA deficiency (H)      Other malaise and fatigue      Other severe protein-calorie malnutrition      Thyroid disease      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, and posterior vitreous detachment     Urinary tract infection, site not specified     Recurrent UTI's         PAST SURGICAL HISTORY:  Past Surgical History:   Procedure Laterality Date     C LIGATE FALLOPIAN TUBE       COLONOSCOPY N/A 4/26/2016    Procedure: COLONOSCOPY;  Surgeon: Dontae Del Rio MD;  Location: [facility]  COLONOSCOPY W BIOPSY  4/26/00      COLONOSCOPY W BIOPSY  10/8/03     HC COLONOSCOPY W BIOPSY  6/27/05    REPEAT IN 5 YEARS     HC CYSTOSCOPY,INSERT URETERAL STENT      Ureteral stent insertion     HC FLEX SIGMOIDOSCOPY W BIOPSY  5/30/00      LAPAROSCOPY, SURGICAL; CHOLECYSTECTOMY  1992      THYROIDECTOMY       LIGATION OF HEMORRHOID(S)      Hemorrhoidectomy     UPPER GI ENDOSCOPY,BIOPSY  10/01/01         SOCIAL HISTORY:  Social History     Socioeconomic History     Marital status:      Spouse name: Not on file     Number of children: Not on file     Years of education: Not on file     Highest education level: Not on file   Occupational History     Not on file   Social Needs     Financial resource strain: Not on file     Food insecurity     Worry: Not on file     Inability: Not on file     Transportation needs     Medical: Not on file     Non-medical: Not on file   Tobacco Use     Smoking status: Never Smoker     Smokeless tobacco: Never Used   Substance and Sexual Activity     Alcohol use: No     Alcohol/week: 0.0 standard drinks     Frequency: Never     Drug use: No     Sexual activity: Never   Lifestyle     Physical activity     Days per week: Not on file     Minutes per session: Not on file     Stress: Not on file   Relationships     Social connections     Talks on phone: Not on file     Gets together: Not on file     Attends Mosque service: Not on file     Active member of club or organization: Not on file     Attends meetings of clubs or organizations: Not on file     Relationship status: Not on file     Intimate partner violence     Fear of current or ex partner: Not on file     Emotionally abused: Not on file     Physically abused: Not on file     Forced sexual activity: Not on file   Other Topics Concern     Parent/sibling w/ CABG, MI or angioplasty before 65F 55M? Not Asked   Social History Narrative     Not on file         FAMILY HISTORY:  Family History   Problem Relation Age of Onset      Colon Cancer Mother 90     Cerebrovascular Disease Father          at age 85     Dementia Brother      Dementia Brother      Pancreatic Cancer Brother      Breast Cancer Sister          PHYSICAL EXAM:  Vital signs:  LMP  (LMP Unknown)    Phone visit.      LABS:  CBC RESULTS:   Recent Labs   Lab Test 20  1045   WBC 8.5   RBC 1.89*   HGB 7.0*   HCT 22.2*   *   MCH 37.0*   MCHC 31.5   RDW 26.8*          Recent Labs   Lab Test 20  1045 20  1211    137   POTASSIUM 3.6 4.3   CHLORIDE 105 107   CO2 25 27   ANIONGAP 6 3   GLC 75 109*   BUN 23 14   CR 0.79 0.72   CULLEN 8.5 8.0*     Lab Results   Component Value Date    AST 69 2020     Lab Results   Component Value Date    ALT 32 2020     No results found for: BILICONJ   Lab Results   Component Value Date    BILITOTAL 3.9 2020     Lab Results   Component Value Date    ALBUMIN 4.5 2020     Lab Results   Component Value Date    PROTTOTAL 6.6 2020      Lab Results   Component Value Date    ALKPHOS 107 2020     Component      Latest Ref Rng & Units 2020          12:11 PM 12:11 PM 12:11 PM 10:45 AM   % Retic      0.5 - 2.0 %       Absolute Retic      25 - 95 10e9/L       IGA      84 - 499 mg/dL <2 (L)      IGG      610 - 1,616 mg/dL  318 (L)     IGM      35 - 242 mg/dL   21 (L)    Lactate Dehydrogenase      81 - 234 U/L    876 (H)     Component      Latest Ref Rng & Units 2020          10:45 AM   % Retic      0.5 - 2.0 % 29.8 (H)   Absolute Retic      25 - 95 10e9/L 545.4 (H)   IGA      84 - 499 mg/dL    IGG      610 - 1,616 mg/dL    IGM      35 - 242 mg/dL    Lactate Dehydrogenase      81 - 234 U/L          ASSESSMENT/PLAN:  Tricia Sosa is a 79 year old female with:      1. 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 Common variable immunodeficiency  disorder  4. Splenomegaly        1) Autoimmune hemolytic anemia: Hemoglobin decreased to 6.1 earlier this week, with worsening reticulocyte count, LDH, bilirubin.  She was started on prednisone 60 mg daily.  Hemoglobin improved to 7.0 today, and she is feeling much better.  Her other labs are still up though, indicating ongoing hemolysis.  I discussed adding rituximab as well.  However, she says that she received that in September 2019 and it didn't really help.  Since the hemoglobin is improving, we will monitor the labs closely.  If it worsens or no improvement, we may need to add rituximab.  We will proceed with her IVIG next week though.  She usually receives it monthly, but has not had it since March 2020 due to COVID pandemic.  She is taking folic acid.    We also talked about other treatment options if her disease becomes refractory to steroids, such as splenectomy.      -continue prednisone 60 mg daily  -IVIG early next week  -weekly labs for now to make sure hemolysis is getting better  -follow-up with me in 1 month    2) CVID: Patient wanted her ESR and CD4 level checked since it hasn't been checked in a while.    -continue IVIG, as above.  She gets it cleopatra Boyd MD  Hematology/Oncology  [facility]        Phone call duration: 15 minutes

## 2020-07-23 NOTE — LETTER
"    7/23/2020         RE: Tricia Sosa  [id number]  [place] [address]        Dear Colleague,    Thank you for referring your patient, Tricia Sosa, to the [facility]. Please see a copy of my visit note below.    Tricia Sosa is a 79 year old female who is being evaluated via a billable telephone visit.      The patient has been notified of following:     \"This telephone visit will be conducted via a call between you and your physician/provider. We have found that certain health care needs can be provided without the need for a physical exam.  This service lets us provide the care you need with a short phone conversation.  If a prescription is necessary we can send it directly to your pharmacy.  If lab work is needed we can place an order for that and you can then stop by our lab to have the test done at a later time.    Telephone visits are billed at different rates depending on your insurance coverage. During this emergency period, for some insurers they may be billed the same as an in-person visit.  Please reach out to your insurance provider with any questions.    If during the course of the call the physician/provider feels a telephone visit is not appropriate, you will not be charged for this service.\"    Patient has given verbal consent for Telephone visit?  Yes    What phone number would you like to be contacted at? [phone]    How would you like to obtain your AVS? Dax Marshall CMA on 7/23/2020 at 1:22 PM      [facility]    Hematology/Oncology Established Patient Follow-up Note      Today's Date: 07/23/20    Reason for Follow-up: Hemolytic anemia-autoimmune; IgA deficiency    HISTORY OF PRESENT ILLNESS: Tricia Sosa is a 79 year old female who presents with autoimmune hemolytic anemia and IgA deficiency.  She previously saw Dr. Matos and then Dr. Summers.  She was previously seen at [facility].    TREATMENT SUMMARY:  Tricia has been " diagnosed with IgA deficiency since her around 2000.  She was very sick at the time and had severe diarrhea.  She lost about 40 pounds in weight.  The workup revealed that she had markedly diminished CD4 counts of 48 and was diagnosed with CMV colitis.  Since then she has been followed in hematology clinic at [place] until recently.  She was noted to have anemia which was fairly long-standing.  She was initially referred for anemia in 2004 and also had a bone marrow biopsy done at that time which revealed hypercellular bone marrow with 70-80% cellularity and normal trilineage hematopoiesis and no morphologic features of MDS or lymphoproliferation.  Her anemia was attributed to her chronic underlying disease.  At the next evaluation in 2002 on 4 aggressive anemia there was no evidence of hemolysis, iron deficiency, B12 or folate deficiency.  Bone marrow biopsy was not pursued.  In summer of 2015 she had progressive fatigue, dyspnea on exertion and worsening anemia with a hemoglobin now of 9.  Workup at this time did suggest a hemolytic anemia with elevated LDH at 709, undetectable haptoglobin and elevated unconjugated bilirubin at 3.3.  Monospecific MARIKA was positive for both IgG and complement.  Cold agglutinin screen was positive with very low titer 1:64.  She was started on prednisone 60 mg daily with supplementation of iron folate and B12 starting 7/31/15.  Her prednisone has been slowly tapered and was discontinued off in January 2016.  She was last followed at [facility] on March 10, 2016 when she had stable hemoglobin.    She was followed by Dr. Matos and Dr. Summers.  Due to relapse of hemolytic anemia, she underwent another course of prednisone that has since been tapered off and rituximab x 4 weekly doses completed in 9/19/19.        INTERIM HISTORY: Tricia has called the clinic because she was feeling quite tired and short of breath.  On 7/20/20, her hemoglobin was down to 6.1, with elevated bilirubin, reticulocyte  count, LDH.  She was started on prednisone 60 mg daily.  She says that she felt much better by the next day with more energy.  She had some trouble sleeping though.        REVIEW OF SYSTEMS:   14 point ROS was reviewed and is negative other than as noted above in HPI.       HOME MEDICATIONS:  Current Outpatient Medications   Medication Sig Dispense Refill     cyanocobalamin (VITAMIN  B-12) 1000 MCG tablet   3     folic acid (FOLVITE) 1 MG tablet   3     hydrochlorothiazide (HYDRODIURIL) 12.5 MG tablet Take 1 tablet (12.5 mg) by mouth daily 30 tablet 1     ketoconazole (NIZORAL) 2 % external shampoo Use every other day to scalp as needed 120 mL 11     levothyroxine (SYNTHROID/LEVOTHROID) 150 MCG tablet Take 1 tablet (150 mcg) by mouth daily 90 tablet 3     predniSONE (DELTASONE) 20 MG tablet Take 3 tablets (60 mg) by mouth daily 90 tablet 0     sulfamethoxazole-trimethoprim (BACTRIM DS/SEPTRA DS) 800-160 MG tablet Take 1 pill orally on Mon, Wed and Friday 45 tablet 3     triamcinolone (KENALOG) 0.1 % external cream        UNABLE TO FIND privigen inj monthly       hydrocortisone butyrate (LOCOID) 0.1 % SOLN solution Apply to scalp bid prn (Patient not taking: Reported on 7/23/2020) 60 mL 11     potassium chloride ER (KLOR-CON M) 20 MEQ CR tablet Take 1 tablet (20 mEq) by mouth daily (Patient not taking: Reported on 7/23/2020) 90 tablet 3         ALLERGIES:  Allergies   Allergen Reactions     Doxycycline          PAST MEDICAL HISTORY:  Past Medical History:   Diagnosis Date     WARD (dyspnea on exertion)      External hemorrhoids without mention of complication 6/27/05     Hemolytic anemia (H)     autoimmune     Hypothyroidism      IgA deficiency (H)      Other malaise and fatigue      Other severe protein-calorie malnutrition      Thyroid disease      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, and posterior vitreous detachment     Urinary tract infection, site not specified     Recurrent  UTI's         PAST SURGICAL HISTORY:  Past Surgical History:   Procedure Laterality Date     C LIGATE FALLOPIAN TUBE       COLONOSCOPY N/A 4/26/2016    Procedure: COLONOSCOPY;  Surgeon: Dontae Del Rio MD;  Location: [facility]     HC COLONOSCOPY W BIOPSY  4/26/00     HC COLONOSCOPY W BIOPSY  10/8/03     HC COLONOSCOPY W BIOPSY  6/27/05    REPEAT IN 5 YEARS     HC CYSTOSCOPY,INSERT URETERAL STENT      Ureteral stent insertion     HC FLEX SIGMOIDOSCOPY W BIOPSY  5/30/00      LAPAROSCOPY, SURGICAL; CHOLECYSTECTOMY  1992      THYROIDECTOMY       LIGATION OF HEMORRHOID(S)      Hemorrhoidectomy     UPPER GI ENDOSCOPY,BIOPSY  10/01/01         SOCIAL HISTORY:  Social History     Socioeconomic History     Marital status:      Spouse name: Not on file     Number of children: Not on file     Years of education: Not on file     Highest education level: Not on file   Occupational History     Not on file   Social Needs     Financial resource strain: Not on file     Food insecurity     Worry: Not on file     Inability: Not on file     Transportation needs     Medical: Not on file     Non-medical: Not on file   Tobacco Use     Smoking status: Never Smoker     Smokeless tobacco: Never Used   Substance and Sexual Activity     Alcohol use: No     Alcohol/week: 0.0 standard drinks     Frequency: Never     Drug use: No     Sexual activity: Never   Lifestyle     Physical activity     Days per week: Not on file     Minutes per session: Not on file     Stress: Not on file   Relationships     Social connections     Talks on phone: Not on file     Gets together: Not on file     Attends Jehovah's witness service: Not on file     Active member of club or organization: Not on file     Attends meetings of clubs or organizations: Not on file     Relationship status: Not on file     Intimate partner violence     Fear of current or ex partner: Not on file     Emotionally abused: Not on file     Physically abused: Not on file     Forced sexual  activity: Not on file   Other Topics Concern     Parent/sibling w/ CABG, MI or angioplasty before 65F 55M? Not Asked   Social History Narrative     Not on file         FAMILY HISTORY:  Family History   Problem Relation Age of Onset     Colon Cancer Mother 90     Cerebrovascular Disease Father          at age 85     Dementia Brother      Dementia Brother      Pancreatic Cancer Brother      Breast Cancer Sister          PHYSICAL EXAM:  Vital signs:  LMP  (LMP Unknown)    Phone visit.      LABS:  CBC RESULTS:   Recent Labs   Lab Test 20  1045   WBC 8.5   RBC 1.89*   HGB 7.0*   HCT 22.2*   *   MCH 37.0*   MCHC 31.5   RDW 26.8*          Recent Labs   Lab Test 20  1045 20  1211    137   POTASSIUM 3.6 4.3   CHLORIDE 105 107   CO2 25 27   ANIONGAP 6 3   GLC 75 109*   BUN 23 14   CR 0.79 0.72   CULLEN 8.5 8.0*     Lab Results   Component Value Date    AST 69 2020     Lab Results   Component Value Date    ALT 32 2020     No results found for: BILICONJ   Lab Results   Component Value Date    BILITOTAL 3.9 2020     Lab Results   Component Value Date    ALBUMIN 4.5 2020     Lab Results   Component Value Date    PROTTOTAL 6.6 2020      Lab Results   Component Value Date    ALKPHOS 107 2020     Component      Latest Ref Rng & Units 2020          12:11 PM 12:11 PM 12:11 PM 10:45 AM   % Retic      0.5 - 2.0 %       Absolute Retic      25 - 95 10e9/L       IGA      84 - 499 mg/dL <2 (L)      IGG      610 - 1,616 mg/dL  318 (L)     IGM      35 - 242 mg/dL   21 (L)    Lactate Dehydrogenase      81 - 234 U/L    876 (H)     Component      Latest Ref Rng & Units 2020          10:45 AM   % Retic      0.5 - 2.0 % 29.8 (H)   Absolute Retic      25 - 95 10e9/L 545.4 (H)   IGA      84 - 499 mg/dL    IGG      610 - 1,616 mg/dL    IGM      35 - 242 mg/dL    Lactate Dehydrogenase      81 - 234 U/L          ASSESSMENT/PLAN:  Tricia PACHECO  Ian is a 79 year old female with:      1. 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 Common variable immunodeficiency disorder  4. Splenomegaly        1) Autoimmune hemolytic anemia: Hemoglobin decreased to 6.1 earlier this week, with worsening reticulocyte count, LDH, bilirubin.  She was started on prednisone 60 mg daily.  Hemoglobin improved to 7.0 today, and she is feeling much better.  Her other labs are still up though, indicating ongoing hemolysis.  I discussed adding rituximab as well.  However, she says that she received that in September 2019 and it didn't really help.  Since the hemoglobin is improving, we will monitor the labs closely.  If it worsens or no improvement, we may need to add rituximab.  We will proceed with her IVIG next week though.  She usually receives it monthly, but has not had it since March 2020 due to COVID pandemic.  She is taking folic acid.    We also talked about other treatment options if her disease becomes refractory to steroids, such as splenectomy.      -continue prednisone 60 mg daily  -IVIG early next week  -weekly labs for now to make sure hemolysis is getting better  -follow-up with me in 1 month    2) CVID: Patient wanted her ESR and CD4 level checked since it hasn't been checked in a while.    -continue IVIG, as above.  She gets it monthlh          Sabina Boyd MD  Hematology/Oncology  [facility]        Phone call duration: 15 minutes          Again, thank you for allowing me to participate in the care of your patient.        Sincerely,        Sabina Boyd MD

## 2020-07-24 LAB
HAPTOGLOB SERPL-MCNC: <3 MG/DL (ref 32–197)
IGA SERPL-MCNC: <2 MG/DL (ref 84–499)
IGG SERPL-MCNC: 350 MG/DL (ref 610–1616)
IGM SERPL-MCNC: 22 MG/DL (ref 35–242)

## 2020-07-24 NOTE — PATIENT INSTRUCTIONS
IVIG scheduled 7/29  Labs scheduled 8/5, 8/12, 8/19  Appt with Dr. Boyd scheduled 8/25  Anabell Hawkins, RN, BSN, [facility]  Patient Care Coordinator  [facility]  [phone]

## 2020-07-27 ENCOUNTER — PATIENT OUTREACH (OUTPATIENT)
Dept: ONCOLOGY | Facility: CLINIC | Age: 80
End: 2020-07-27

## 2020-07-27 NOTE — PROGRESS NOTES
"S-(situation): Tricia called in to clinic asking if she should have a  to her appointment on Wednesday    B-(background): She reports she is concerned because she doesn't know what her Hgb is at and yesterday she was feeling light-headed, dizzy, and wobbly on her feet.     Today she denies dizziness, LH, wobbliness. She reports she is tired today because of taking the prednisone and not sleeping well at night.     A-(assessment): Most recent Hgb was 7.0 on 7/23, up from 6.11 on 7/20. Tricia is due to come in on Wednesday 7/29 for IVIG. Tricia reports she has been eating and drinking well.    R-(recommendations): Writer instructed Tricia to call in to clinic if she has any worsening of symptoms such as dizziness, LH, feeling unstable. Writer encouraged Tricia to have a  on Wednesday for her infusion appointment. Per chart review, Tricia is due to get \"standing\" labs drawn on Wednesday, 7/29, which includes a CBC recheck. Writer will update Dr. Boyd.    Anabell Hawkins, RN, BSN, [facility]  Patient Care Coordinator  [facility]  [phone]        "

## 2020-07-29 ENCOUNTER — PATIENT OUTREACH (OUTPATIENT)
Dept: ONCOLOGY | Facility: CLINIC | Age: 80
End: 2020-07-29

## 2020-07-29 ENCOUNTER — HOSPITAL ENCOUNTER (OUTPATIENT)
Facility: CLINIC | Age: 80
Setting detail: SPECIMEN
Discharge: HOME | End: 2020-07-29
Attending: INTERNAL MEDICINE | Admitting: INTERNAL MEDICINE
Payer: MEDICARE

## 2020-07-29 ENCOUNTER — INFUSION THERAPY VISIT (OUTPATIENT)
Dept: INFUSION THERAPY | Facility: CLINIC | Age: 80
End: 2020-07-29
Attending: INTERNAL MEDICINE
Payer: MEDICARE

## 2020-07-29 VITALS
OXYGEN SATURATION: 97 % | TEMPERATURE: 98.6 F | RESPIRATION RATE: 18 BRPM | SYSTOLIC BLOOD PRESSURE: 131 MMHG | BODY MASS INDEX: 20.05 KG/M2 | DIASTOLIC BLOOD PRESSURE: 56 MMHG | HEART RATE: 84 BPM | WEIGHT: 147.8 LBS

## 2020-07-29 DIAGNOSIS — D59.19 OTHER AUTOIMMUNE HEMOLYTIC ANEMIAS: ICD-10-CM

## 2020-07-29 DIAGNOSIS — D83.9 CVID (COMMON VARIABLE IMMUNODEFICIENCY) (H): ICD-10-CM

## 2020-07-29 DIAGNOSIS — D83.9 CVID (COMMON VARIABLE IMMUNODEFICIENCY) (H): Primary | ICD-10-CM

## 2020-07-29 DIAGNOSIS — D59.19 OTHER AUTOIMMUNE HEMOLYTIC ANEMIAS: Primary | ICD-10-CM

## 2020-07-29 LAB
ALBUMIN SERPL-MCNC: 4 G/DL (ref 3.4–5)
ALP SERPL-CCNC: 87 U/L (ref 40–150)
ALT SERPL W P-5'-P-CCNC: 31 U/L (ref 0–50)
ANION GAP SERPL CALCULATED.3IONS-SCNC: 6 MMOL/L (ref 3–14)
AST SERPL W P-5'-P-CCNC: 71 U/L (ref 0–45)
BASOPHILS # BLD AUTO: 0 10E9/L (ref 0–0.2)
BASOPHILS NFR BLD AUTO: 0 %
BILIRUB SERPL-MCNC: 4.6 MG/DL (ref 0.2–1.3)
BUN SERPL-MCNC: 17 MG/DL (ref 7–30)
CALCIUM SERPL-MCNC: 8.1 MG/DL (ref 8.5–10.1)
CHLORIDE SERPL-SCNC: 103 MMOL/L (ref 94–109)
CO2 SERPL-SCNC: 26 MMOL/L (ref 20–32)
CREAT SERPL-MCNC: 0.69 MG/DL (ref 0.52–1.04)
DIFFERENTIAL METHOD BLD: ABNORMAL
EOSINOPHIL # BLD AUTO: 0 10E9/L (ref 0–0.7)
EOSINOPHIL NFR BLD AUTO: 0.2 %
ERYTHROCYTE [DISTWIDTH] IN BLOOD BY AUTOMATED COUNT: 20.1 % (ref 10–15)
ERYTHROCYTE [SEDIMENTATION RATE] IN BLOOD BY WESTERGREN METHOD: 119 MM/H (ref 0–30)
GFR SERPL CREATININE-BSD FRML MDRD: 82 ML/MIN/{1.73_M2}
GLUCOSE SERPL-MCNC: 109 MG/DL (ref 70–99)
HCT VFR BLD AUTO: 19.3 % (ref 35–47)
HGB BLD-MCNC: 6.4 G/DL (ref 11.7–15.7)
IMM GRANULOCYTES # BLD: 0.4 10E9/L (ref 0–0.4)
IMM GRANULOCYTES NFR BLD: 3.9 %
LDH SERPL L TO P-CCNC: 861 U/L (ref 81–234)
LYMPHOCYTES # BLD AUTO: 1 10E9/L (ref 0.8–5.3)
LYMPHOCYTES NFR BLD AUTO: 10.9 %
MCH RBC QN AUTO: 39.3 PG (ref 26.5–33)
MCHC RBC AUTO-ENTMCNC: 33.2 G/DL (ref 31.5–36.5)
MCV RBC AUTO: 118 FL (ref 78–100)
MONOCYTES # BLD AUTO: 1 10E9/L (ref 0–1.3)
MONOCYTES NFR BLD AUTO: 10.3 %
NEUTROPHILS # BLD AUTO: 6.8 10E9/L (ref 1.6–8.3)
NEUTROPHILS NFR BLD AUTO: 74.7 %
NRBC # BLD AUTO: 0 10*3/UL
NRBC BLD AUTO-RTO: 0 /100
PLATELET # BLD AUTO: 157 10E9/L (ref 150–450)
POTASSIUM SERPL-SCNC: 3.8 MMOL/L (ref 3.4–5.3)
PROT SERPL-MCNC: 5.8 G/DL (ref 6.8–8.8)
RBC # BLD AUTO: 1.63 10E12/L (ref 3.8–5.2)
RETICS # AUTO: 331 10E9/L (ref 25–95)
RETICS/RBC NFR AUTO: 19.7 % (ref 0.5–2)
SODIUM SERPL-SCNC: 135 MMOL/L (ref 133–144)
WBC # BLD AUTO: 9 10E9/L (ref 4–11)

## 2020-07-29 PROCEDURE — 86359 T CELLS TOTAL COUNT: CPT | Performed by: INTERNAL MEDICINE

## 2020-07-29 PROCEDURE — 82784 ASSAY IGA/IGD/IGG/IGM EACH: CPT | Performed by: INTERNAL MEDICINE

## 2020-07-29 PROCEDURE — 96366 THER/PROPH/DIAG IV INF ADDON: CPT

## 2020-07-29 PROCEDURE — 85045 AUTOMATED RETICULOCYTE COUNT: CPT | Performed by: INTERNAL MEDICINE

## 2020-07-29 PROCEDURE — 96375 TX/PRO/DX INJ NEW DRUG ADDON: CPT

## 2020-07-29 PROCEDURE — 25000128 H RX IP 250 OP 636: Performed by: INTERNAL MEDICINE

## 2020-07-29 PROCEDURE — 80053 COMPREHEN METABOLIC PANEL: CPT | Performed by: INTERNAL MEDICINE

## 2020-07-29 PROCEDURE — 83010 ASSAY OF HAPTOGLOBIN QUANT: CPT | Performed by: INTERNAL MEDICINE

## 2020-07-29 PROCEDURE — 85025 COMPLETE CBC W/AUTO DIFF WBC: CPT | Performed by: INTERNAL MEDICINE

## 2020-07-29 PROCEDURE — 85652 RBC SED RATE AUTOMATED: CPT | Performed by: INTERNAL MEDICINE

## 2020-07-29 PROCEDURE — 96365 THER/PROPH/DIAG IV INF INIT: CPT

## 2020-07-29 PROCEDURE — 83615 LACTATE (LD) (LDH) ENZYME: CPT | Performed by: INTERNAL MEDICINE

## 2020-07-29 PROCEDURE — 86360 T CELL ABSOLUTE COUNT/RATIO: CPT | Performed by: INTERNAL MEDICINE

## 2020-07-29 RX ORDER — MEPERIDINE HYDROCHLORIDE 25 MG/ML
25 INJECTION INTRAMUSCULAR; INTRAVENOUS; SUBCUTANEOUS EVERY 30 MIN PRN
Status: CANCELLED | OUTPATIENT
Start: 2020-08-14

## 2020-07-29 RX ORDER — DIPHENHYDRAMINE HCL 25 MG
50 CAPSULE ORAL ONCE
Status: CANCELLED
Start: 2020-07-29

## 2020-07-29 RX ORDER — METHYLPREDNISOLONE SODIUM SUCCINATE 125 MG/2ML
125 INJECTION, POWDER, LYOPHILIZED, FOR SOLUTION INTRAMUSCULAR; INTRAVENOUS
Status: CANCELLED
Start: 2020-08-21

## 2020-07-29 RX ORDER — DIPHENHYDRAMINE HYDROCHLORIDE 50 MG/ML
50 INJECTION INTRAMUSCULAR; INTRAVENOUS
Status: CANCELLED
Start: 2020-08-21

## 2020-07-29 RX ORDER — MEPERIDINE HYDROCHLORIDE 25 MG/ML
25 INJECTION INTRAMUSCULAR; INTRAVENOUS; SUBCUTANEOUS
Status: CANCELLED
Start: 2020-08-21

## 2020-07-29 RX ORDER — ACETAMINOPHEN 325 MG/1
650 TABLET ORAL ONCE
Status: CANCELLED | OUTPATIENT
Start: 2020-07-29

## 2020-07-29 RX ORDER — NALOXONE HYDROCHLORIDE 0.4 MG/ML
.1-.4 INJECTION, SOLUTION INTRAMUSCULAR; INTRAVENOUS; SUBCUTANEOUS
Status: CANCELLED | OUTPATIENT
Start: 2020-08-07

## 2020-07-29 RX ORDER — ACETAMINOPHEN 325 MG/1
650 TABLET ORAL ONCE
Status: DISCONTINUED | OUTPATIENT
Start: 2020-07-29 | End: 2020-07-29 | Stop reason: HOSPADM

## 2020-07-29 RX ORDER — MEPERIDINE HYDROCHLORIDE 25 MG/ML
25 INJECTION INTRAMUSCULAR; INTRAVENOUS; SUBCUTANEOUS EVERY 30 MIN PRN
Status: CANCELLED | OUTPATIENT
Start: 2020-08-21

## 2020-07-29 RX ORDER — METHYLPREDNISOLONE SODIUM SUCCINATE 125 MG/2ML
125 INJECTION, POWDER, LYOPHILIZED, FOR SOLUTION INTRAMUSCULAR; INTRAVENOUS
Status: CANCELLED
Start: 2020-08-14

## 2020-07-29 RX ORDER — ALBUTEROL SULFATE 0.83 MG/ML
2.5 SOLUTION RESPIRATORY (INHALATION)
Status: CANCELLED | OUTPATIENT
Start: 2020-08-14

## 2020-07-29 RX ORDER — METHYLPREDNISOLONE SODIUM SUCCINATE 125 MG/2ML
125 INJECTION, POWDER, LYOPHILIZED, FOR SOLUTION INTRAMUSCULAR; INTRAVENOUS
Status: CANCELLED
Start: 2020-08-07

## 2020-07-29 RX ORDER — DIPHENHYDRAMINE HCL 25 MG
50 CAPSULE ORAL ONCE
Status: CANCELLED | OUTPATIENT
Start: 2020-07-29

## 2020-07-29 RX ORDER — EPINEPHRINE 0.3 MG/.3ML
0.3 INJECTION SUBCUTANEOUS EVERY 5 MIN PRN
Status: CANCELLED | OUTPATIENT
Start: 2020-08-14

## 2020-07-29 RX ORDER — SODIUM CHLORIDE 9 MG/ML
1000 INJECTION, SOLUTION INTRAVENOUS CONTINUOUS PRN
Status: CANCELLED
Start: 2020-08-07

## 2020-07-29 RX ORDER — NALOXONE HYDROCHLORIDE 0.4 MG/ML
.1-.4 INJECTION, SOLUTION INTRAMUSCULAR; INTRAVENOUS; SUBCUTANEOUS
Status: CANCELLED | OUTPATIENT
Start: 2020-07-29

## 2020-07-29 RX ORDER — LORAZEPAM 2 MG/ML
0.5 INJECTION INTRAMUSCULAR EVERY 4 HOURS PRN
Status: CANCELLED
Start: 2020-07-29

## 2020-07-29 RX ORDER — ACETAMINOPHEN 325 MG/1
650 TABLET ORAL ONCE
Status: CANCELLED | OUTPATIENT
Start: 2020-08-07

## 2020-07-29 RX ORDER — SODIUM CHLORIDE 9 MG/ML
1000 INJECTION, SOLUTION INTRAVENOUS CONTINUOUS PRN
Status: CANCELLED
Start: 2020-08-14

## 2020-07-29 RX ORDER — ALBUTEROL SULFATE 90 UG/1
1-2 AEROSOL, METERED RESPIRATORY (INHALATION)
Status: CANCELLED
Start: 2020-07-29

## 2020-07-29 RX ORDER — LORAZEPAM 2 MG/ML
0.5 INJECTION INTRAMUSCULAR EVERY 4 HOURS PRN
Status: CANCELLED
Start: 2020-08-14

## 2020-07-29 RX ORDER — HEPARIN SODIUM (PORCINE) LOCK FLUSH IV SOLN 100 UNIT/ML 100 UNIT/ML
5 SOLUTION INTRAVENOUS
Status: CANCELLED | OUTPATIENT
Start: 2020-07-29

## 2020-07-29 RX ORDER — MEPERIDINE HYDROCHLORIDE 25 MG/ML
25 INJECTION INTRAMUSCULAR; INTRAVENOUS; SUBCUTANEOUS
Status: CANCELLED
Start: 2020-07-29

## 2020-07-29 RX ORDER — DIPHENHYDRAMINE HCL 25 MG
50 CAPSULE ORAL ONCE
Status: CANCELLED | OUTPATIENT
Start: 2020-08-07

## 2020-07-29 RX ORDER — MEPERIDINE HYDROCHLORIDE 25 MG/ML
25 INJECTION INTRAMUSCULAR; INTRAVENOUS; SUBCUTANEOUS EVERY 30 MIN PRN
Status: CANCELLED | OUTPATIENT
Start: 2020-08-07

## 2020-07-29 RX ORDER — DIPHENHYDRAMINE HYDROCHLORIDE 50 MG/ML
50 INJECTION INTRAMUSCULAR; INTRAVENOUS
Status: CANCELLED
Start: 2020-08-14

## 2020-07-29 RX ORDER — ALBUTEROL SULFATE 90 UG/1
1-2 AEROSOL, METERED RESPIRATORY (INHALATION)
Status: CANCELLED
Start: 2020-08-21

## 2020-07-29 RX ORDER — ALBUTEROL SULFATE 0.83 MG/ML
2.5 SOLUTION RESPIRATORY (INHALATION)
Status: CANCELLED | OUTPATIENT
Start: 2020-08-21

## 2020-07-29 RX ORDER — NALOXONE HYDROCHLORIDE 0.4 MG/ML
.1-.4 INJECTION, SOLUTION INTRAMUSCULAR; INTRAVENOUS; SUBCUTANEOUS
Status: CANCELLED | OUTPATIENT
Start: 2020-08-14

## 2020-07-29 RX ORDER — EPINEPHRINE 1 MG/ML
0.3 INJECTION, SOLUTION INTRAMUSCULAR; SUBCUTANEOUS EVERY 5 MIN PRN
Status: CANCELLED | OUTPATIENT
Start: 2020-08-21

## 2020-07-29 RX ORDER — EPINEPHRINE 0.3 MG/.3ML
0.3 INJECTION SUBCUTANEOUS EVERY 5 MIN PRN
Status: CANCELLED | OUTPATIENT
Start: 2020-08-21

## 2020-07-29 RX ORDER — METHYLPREDNISOLONE SODIUM SUCCINATE 125 MG/2ML
125 INJECTION, POWDER, LYOPHILIZED, FOR SOLUTION INTRAMUSCULAR; INTRAVENOUS
Status: CANCELLED
Start: 2020-07-29

## 2020-07-29 RX ORDER — MEPERIDINE HYDROCHLORIDE 25 MG/ML
25 INJECTION INTRAMUSCULAR; INTRAVENOUS; SUBCUTANEOUS EVERY 30 MIN PRN
Status: CANCELLED | OUTPATIENT
Start: 2020-07-29

## 2020-07-29 RX ORDER — EPINEPHRINE 1 MG/ML
0.3 INJECTION, SOLUTION INTRAMUSCULAR; SUBCUTANEOUS EVERY 5 MIN PRN
Status: CANCELLED | OUTPATIENT
Start: 2020-07-29

## 2020-07-29 RX ORDER — SODIUM CHLORIDE 9 MG/ML
1000 INJECTION, SOLUTION INTRAVENOUS CONTINUOUS PRN
Status: CANCELLED
Start: 2020-07-29

## 2020-07-29 RX ORDER — LORAZEPAM 2 MG/ML
0.5 INJECTION INTRAMUSCULAR EVERY 4 HOURS PRN
Status: CANCELLED
Start: 2020-08-21

## 2020-07-29 RX ORDER — DIPHENHYDRAMINE HYDROCHLORIDE 50 MG/ML
50 INJECTION INTRAMUSCULAR; INTRAVENOUS
Status: CANCELLED
Start: 2020-08-07

## 2020-07-29 RX ORDER — ACETAMINOPHEN 325 MG/1
650 TABLET ORAL ONCE
Status: CANCELLED | OUTPATIENT
Start: 2020-08-21

## 2020-07-29 RX ORDER — EPINEPHRINE 0.3 MG/.3ML
0.3 INJECTION SUBCUTANEOUS EVERY 5 MIN PRN
Status: CANCELLED | OUTPATIENT
Start: 2020-07-29

## 2020-07-29 RX ORDER — NALOXONE HYDROCHLORIDE 0.4 MG/ML
.1-.4 INJECTION, SOLUTION INTRAMUSCULAR; INTRAVENOUS; SUBCUTANEOUS
Status: CANCELLED | OUTPATIENT
Start: 2020-08-21

## 2020-07-29 RX ORDER — ACETAMINOPHEN 325 MG/1
650 TABLET ORAL ONCE
Status: CANCELLED | OUTPATIENT
Start: 2020-08-14

## 2020-07-29 RX ORDER — DIPHENHYDRAMINE HCL 25 MG
50 CAPSULE ORAL ONCE
Status: CANCELLED | OUTPATIENT
Start: 2020-08-14

## 2020-07-29 RX ORDER — LORAZEPAM 2 MG/ML
0.5 INJECTION INTRAMUSCULAR EVERY 4 HOURS PRN
Status: CANCELLED
Start: 2020-08-07

## 2020-07-29 RX ORDER — MEPERIDINE HYDROCHLORIDE 25 MG/ML
25 INJECTION INTRAMUSCULAR; INTRAVENOUS; SUBCUTANEOUS
Status: CANCELLED
Start: 2020-08-07

## 2020-07-29 RX ORDER — ACETAMINOPHEN 325 MG/1
650 TABLET ORAL ONCE
Status: CANCELLED
Start: 2020-07-31

## 2020-07-29 RX ORDER — ALBUTEROL SULFATE 90 UG/1
1-2 AEROSOL, METERED RESPIRATORY (INHALATION)
Status: CANCELLED
Start: 2020-08-07

## 2020-07-29 RX ORDER — EPINEPHRINE 0.3 MG/.3ML
0.3 INJECTION SUBCUTANEOUS EVERY 5 MIN PRN
Status: CANCELLED | OUTPATIENT
Start: 2020-08-07

## 2020-07-29 RX ORDER — DIPHENHYDRAMINE HCL 25 MG
50 CAPSULE ORAL ONCE
Status: CANCELLED | OUTPATIENT
Start: 2020-08-21

## 2020-07-29 RX ORDER — MEPERIDINE HYDROCHLORIDE 25 MG/ML
25 INJECTION INTRAMUSCULAR; INTRAVENOUS; SUBCUTANEOUS
Status: CANCELLED
Start: 2020-08-14

## 2020-07-29 RX ORDER — HEPARIN SODIUM,PORCINE 10 UNIT/ML
5 VIAL (ML) INTRAVENOUS
Status: CANCELLED | OUTPATIENT
Start: 2020-07-29

## 2020-07-29 RX ORDER — EPINEPHRINE 1 MG/ML
0.3 INJECTION, SOLUTION INTRAMUSCULAR; SUBCUTANEOUS EVERY 5 MIN PRN
Status: CANCELLED | OUTPATIENT
Start: 2020-08-14

## 2020-07-29 RX ORDER — ALBUTEROL SULFATE 0.83 MG/ML
2.5 SOLUTION RESPIRATORY (INHALATION)
Status: CANCELLED | OUTPATIENT
Start: 2020-07-29

## 2020-07-29 RX ORDER — SODIUM CHLORIDE 9 MG/ML
1000 INJECTION, SOLUTION INTRAVENOUS CONTINUOUS PRN
Status: CANCELLED
Start: 2020-08-21

## 2020-07-29 RX ORDER — ALBUTEROL SULFATE 90 UG/1
1-2 AEROSOL, METERED RESPIRATORY (INHALATION)
Status: CANCELLED
Start: 2020-08-14

## 2020-07-29 RX ORDER — ALBUTEROL SULFATE 0.83 MG/ML
2.5 SOLUTION RESPIRATORY (INHALATION)
Status: CANCELLED | OUTPATIENT
Start: 2020-08-07

## 2020-07-29 RX ORDER — EPINEPHRINE 1 MG/ML
0.3 INJECTION, SOLUTION INTRAMUSCULAR; SUBCUTANEOUS EVERY 5 MIN PRN
Status: CANCELLED | OUTPATIENT
Start: 2020-08-07

## 2020-07-29 RX ORDER — DIPHENHYDRAMINE HYDROCHLORIDE 50 MG/ML
50 INJECTION INTRAMUSCULAR; INTRAVENOUS
Status: CANCELLED
Start: 2020-07-29

## 2020-07-29 RX ADMIN — HUMAN IMMUNOGLOBULIN G 35 G: 20 LIQUID INTRAVENOUS at 13:08

## 2020-07-29 RX ADMIN — HYDROCORTISONE SODIUM SUCCINATE 100 MG: 100 INJECTION, POWDER, FOR SOLUTION INTRAMUSCULAR; INTRAVENOUS at 13:04

## 2020-07-29 NOTE — PROGRESS NOTES
I am covering for Dr. Boyd's in basket    This is a patient with autoimmune hemolytic anemia    07/20-hemoglobin dropped to 6.1 -started prednisone 60 mg p.o. daily.   07/23-hemoglobin up to 7  07/29-hemoglobin down to 6.4    07/20-Dr. Boyd's note from 07/20/2020 indicates to add rituximab if worsening or no improvement.  Patient received rituximab in September 2019    I called Anabell Hawkins RN at [facility]   Plan:  -start rituximab weekly x 4   Order placed for rituximab   schedule with CHELSEA Abdi next week

## 2020-07-29 NOTE — PROGRESS NOTES
Writer notified FORD Arredondo that there is no availability here in the [place] infusion center or at [facility] today or tomorrow for rituximab. The patient is not willing to go to the [facility] for her infusion. Writer spoke with infusion charge RN, Sarah. The earliest Tricia can get her Rituximab today would be Friday 8/31 at 0800.     Arnulfo was notified. Writer also notified HIPOLITO Abdi. She checked with Dr. Alvarado with hem/onc. Ok to have patient wait until Friday for her rituximab infusion. Trinidad also requests a telephone visit with the patient on Friday.     Writer sent request to scheduling.    Anabell Hawkins RN, BSN, MAOM  Patient Care Coordinator  [facility] [place]  [phone]

## 2020-07-29 NOTE — PROGRESS NOTES
S-(situation): writer was notified by infusion RNSerina that Tricia's Hgb today was:    Results for TRICIA MARRUFO (MRN [id number]) as of 7/29/2020 13:49   Ref. Range 7/29/2020 12:45   Hemoglobin Latest Ref Range: 11.7 - 15.7 g/dL 6.4 (LL)       B-(background): Last visit with Dr. Boyd on 7/23/20     A-(assessment): Hgb very low, needs provider recommendations    R-(recommendations): Writer spoke with FORD Arredondo, who is covering for Dr. Boyd while she is out of office this week. He is recommending Tricia start rituximab TODAY for her low hgb. Writer also notified him about her low Calcium and he did not order any interventions at this time for the calcium. Writer called and updated the infusion RNSerina.     Anabell Hawkins, RN, BSN, MAOM  Patient Care Coordinator  [facility]  [phone]

## 2020-07-29 NOTE — PROGRESS NOTES
Infusion Nursing Note:  Tricia Sosa presents today for IVIG.    Patient seen by provider today: No   present during visit today: Not Applicable.    Note: hgb  6.4.  Contacted MAGGIE Hung, who works with Dr Boyd .Arnulfo vincent for [facility], wants Rituximab started today if possible. Currently, IVIG is still running. Not enough time here today.  Pt aware of need for Rituxan. Will return 7/31 8 AM here for the infusion.  If she decompensates, chest pain, SOB, increased dizziness, she should go to the ED    Intravenous Access:  Lab draw site R arm, Needle type piv, Gauge 24.  Labs drawn without difficulty.  Peripheral IV placed.    Treatment Conditions:  Lab Results   Component Value Date    HGB 6.4 07/29/2020     Lab Results   Component Value Date    WBC 9.0 07/29/2020      Lab Results   Component Value Date    ANEU 6.8 07/29/2020     Lab Results   Component Value Date     07/29/2020      Lab Results   Component Value Date     07/29/2020                   Lab Results   Component Value Date    POTASSIUM 3.8 07/29/2020           Lab Results   Component Value Date    MAG 2.00 06/08/2000            Lab Results   Component Value Date    CR 0.69 07/29/2020                   Lab Results   Component Value Date    CULLEN 8.1 07/29/2020                Lab Results   Component Value Date    BILITOTAL 4.6 07/29/2020           Lab Results   Component Value Date    ALBUMIN 4.0 07/29/2020                    Lab Results   Component Value Date    ALT 31 07/29/2020           Lab Results   Component Value Date    AST 71 07/29/2020       Results reviewed, labs MET treatment parameters, ok to proceed with treatment.  Biological Infusion Checklist:  ~~~ NOTE: If the patient answers yes to any of the questions below, hold the infusion and contact ordering provider or on-call provider.    1. Have you recently had an elevated temperature, fever, chills, productive cough, coughing for 3 weeks or longer or hemoptysis,  abnormal vital signs, night sweats,  chest pain or have you noticed a decrease in your appetite, unexplained weight loss or fatigue? No  2. Do you have any open wounds or new incisions? No  3. Do you have any recent or upcoming hospitalizations, surgeries or dental procedures? No  4. Do you currently have or recently have had any signs of illness or infection or are you on any antibiotics? No  5. Have you had any new, sudden or worsening abdominal pain? No  6. Have you or anyone in your household received a live vaccination in the past 4 weeks? Please note:  No live vaccines while on biologic/chemotherapy until 6 months after the last treatment.  Patient can receive the flu vaccine (shot only) and the pneumovax.  It is optimal for the patient to get these vaccines mid cycle, but they can be given at any time as long as it is not on the day of the infusion. No  7. Have you recently been diagnosed with any new nervous system diseases (ie. Multiple sclerosis, Guillain [place], seizures, neurological changes) or cancer diagnosis? No  8. Are you on any form of radiation or chemotherapy? No  9. Are you pregnant or breast feeding or do you have plans of pregnancy in the future? No  10. Have you been having any signs of worsening depression or suicidal ideations?  (benlysta only) No  11. Have there been any other new onset medical symptoms? No      Infusion initiated @ 0.5 ml/kg/hr x 15 min  incr to 1 ml/kg/hr x 15 min  incr to 2 ml/kg/hr x 15 min  incr to 3 ml/kg/hr x 15 min  incr to 4 ml/kg/hr to completion    Post Infusion Assessment:  Patient tolerated infusion without incident.  Blood return noted pre and post infusion.  Site patent and intact, free from redness, edema or discomfort.  No evidence of extravasations.  Access discontinued per protocol.    Biologic Infusion Post Education:   Call the triage nurse at your clinic or seek medical attention if you have chills and/or temperature greater than or equal to 100.5,  uncontrolled nausea/vomiting, diarrhea, constipation, dizziness, shortness of breath, chest pain, heart palpitations, weakness or any other new or concerning symptoms, questions or concerns.  You cannot have any live virus vaccines prior to or during treatment or up to 6 months post infusion.  If you have an upcoming surgery, medical procedure or dental procedure during treatment, this should be discussed with your ordering physician and your surgeon/dentist.  If you are having any concerning symptom, if you are unsure if you should get your next infusion or wish to speak to a provider before your next infusion, please call your care coordinator or triage nurse at your clinic to notify them so we can adequately serve you.       Discharge Plan:   Discharge instructions reviewed with: Patient.  Patient and/or family verbalized understanding of discharge instructions and all questions answered.  AVS to patient via [facility].  Patient will return 7/30 for next appointment.   Patient discharged in stable condition accompanied by: self.  Departure Mode: Ambulatory.    Serina Otto RN

## 2020-07-30 ENCOUNTER — PATIENT OUTREACH (OUTPATIENT)
Dept: ONCOLOGY | Facility: CLINIC | Age: 80
End: 2020-07-30

## 2020-07-30 LAB
CD3 CELLS # BLD: 822 CELLS/UL (ref 603–2990)
CD3 CELLS NFR BLD: 94 % (ref 49–84)
CD3+CD4+ CELLS # BLD: 319 CELLS/UL (ref 441–2156)
CD3+CD4+ CELLS NFR BLD: 37 % (ref 28–63)
CD3+CD4+ CELLS/CD3+CD8+ CLL BLD: 0.67 % (ref 1.4–2.6)
CD3+CD8+ CELLS # BLD: 476 CELLS/UL (ref 125–1312)
CD3+CD8+ CELLS NFR BLD: 55 % (ref 10–40)
HAPTOGLOB SERPL-MCNC: <3 MG/DL (ref 32–197)
IFC SPECIMEN: ABNORMAL
IGA SERPL-MCNC: <2 MG/DL (ref 84–499)
IGG SERPL-MCNC: 304 MG/DL (ref 610–1616)
IGM SERPL-MCNC: 18 MG/DL (ref 35–242)

## 2020-07-30 NOTE — PROGRESS NOTES
"Tricia Sosa is a 79 year old female who is being evaluated via a billable telephone visit.      The patient has been notified of following:     \"This telephone visit will be conducted via a call between you and your physician/provider. We have found that certain health care needs can be provided without the need for a physical exam.  This service lets us provide the care you need with a short phone conversation.  If a prescription is necessary we can send it directly to your pharmacy.  If lab work is needed we can place an order for that and you can then stop by our lab to have the test done at a later time.    Telephone visits are billed at different rates depending on your insurance coverage. During this emergency period, for some insurers they may be billed the same as an in-person visit.  Please reach out to your insurance provider with any questions.    If during the course of the call the physician/provider feels a telephone visit is not appropriate, you will not be charged for this service.\"    Patient has given verbal consent for Telephone visit?  Yes    What phone number would you like to be contacted at? [phone]    How would you like to obtain your AVS? 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  She was previously seen by Dr. Matos, then Dr. Summers and currently under the care with Dr. Boyd. Please see her note on 7/23/2020 for details of her hematology history.    She has been intermittently on prednisone and Rituxan for hemolytic anemia and recently saw Dr. Boyd on 7/23/2020.  Despite being on prednisone 60 mg daily, her hemoglobin dropped to 6.4 on 7/29, unfortunately.    She is here today to start rituximab infusions.     Interval History: Tricia is " doing okay today.  She has been noticing some worsening dyspnea on exertion, especially with steps.  She continues on prednisone 60 mg daily and this is keeping her up at night, unfortunately.  She has had a difficult time going back to sleep, but she does nap during the day, if possible.  She has been having some urinary hesitancy that is been ongoing for many years, but she is noticing some worsening symptoms recently.  No dysuria, hematuria.  Denies fever, chills, headache or vision changes, vomiting or diarrhea or skin changes.  No other complaints.    Review of Systems: See interval hx. Denies fevers, chills, HA, dizziness, n/t, changes in vision, cough, sore throat, CP, SOB, abdominal pain, N/V, diarrhea, bleeding, bruising, rash.     PMHx and Social Hx reviewed per EPIC.      Medications:  Current Outpatient Medications   Medication Sig Dispense Refill     cyanocobalamin (VITAMIN  B-12) 1000 MCG tablet   3     folic acid (FOLVITE) 1 MG tablet   3     hydrochlorothiazide (HYDRODIURIL) 12.5 MG tablet Take 1 tablet (12.5 mg) by mouth daily 30 tablet 1     hydrocortisone butyrate (LOCOID) 0.1 % SOLN solution Apply to scalp bid prn 60 mL 11     ketoconazole (NIZORAL) 2 % external shampoo Use every other day to scalp as needed 120 mL 11     levothyroxine (SYNTHROID/LEVOTHROID) 150 MCG tablet Take 1 tablet (150 mcg) by mouth daily 90 tablet 3     potassium chloride ER (KLOR-CON M) 20 MEQ CR tablet Take 1 tablet (20 mEq) by mouth daily 90 tablet 3     predniSONE (DELTASONE) 20 MG tablet Take 3 tablets (60 mg) by mouth daily 90 tablet 0     sulfamethoxazole-trimethoprim (BACTRIM DS/SEPTRA DS) 800-160 MG tablet Take 1 pill orally on Mon, Wed and Friday 45 tablet 3     triamcinolone (KENALOG) 0.1 % external cream        UNABLE TO FIND privigen inj monthly         Allergies   Allergen Reactions     Doxycycline        EXAM:    LMP  (LMP Unknown)  Telephone visit, therefore vital signs and exam were not performed.    Labs:  PENDING    Imaging: n/a    Impression/Plan: Tricia Sosa is a 79 year old female with autoimmune hemolytic anemia and IgA deficiency, currently on prednisone 60 mg daily and IVIG, starting weekly rituximab.    Autoimmune hemolytic anemia: Currently on prednisone 60 mg daily, hemoglobin had dropped to 6.4 on 7/29.  Dr. Boyd's last note recommended starting rituximab I spoke to Dr. Alvarado, and Dr. Boyd's absence, plan for weekly rituximab x4.  We discussed, toxicities today and she has received rituximab in the past and questions were answered.  She will continue prednisone 60 mg daily. I will reach out to Dr. Boyd next week to follow CBC and see if she would recommend prednisone taper.   --Addendum 8/4: I spoke to Dr. Boyd and she recommended continuing prednisone 60 mg for now.  We will consider taper once her hemoglobin starts to improve.    WARD: Likely secondary to anemia.  No acute symptoms, but will monitor.  If symptoms worsen over the weekend with chest pain and worsening WARD, recommended ED evaluation.    Urinary hesitancy: This is been ongoing for 20+ years, but worsening over the last 1 to 2 weeks.  We will obtain UA to look for UTI.  If no evidence of infection, she will monitor symptoms and establish care with urology if needed.    Phone call duration: 13 minutes    Chart documentation with Dragon Voice recognition Software. Although reviewed after completion, some words and grammatical errors may remain.      Trinidad Liu PA-C  Hematology/Oncology  [facility]

## 2020-07-30 NOTE — PROGRESS NOTES
Writer called Tricia to check on pt status since her Hgb was 6.4 yesterday. Tricia reports she is feeling much better today, her only complaint was she is very tired today because she was up at 4:30am from the prednisone.     Tricia denies SOB, chest pain, dizziness, LH.     Writer offered to go over Rituxan information with Tricia and she would like to read over the information. Tricia agreed to having Rituxan information sent through My Chart for her to review.     Writer instructed Tricia to call in to the nurse call line if she has any new symptoms or questions/concerns before tomorrow's appt. Her son will be able to drive her to the appointment tomorrow. She had no further questions or concerns at this time.     Anabell Hawkins RN, BSN, [facility]  Patient Care Coordinator  [facility]  [phone]

## 2020-07-31 ENCOUNTER — HOSPITAL ENCOUNTER (OUTPATIENT)
Facility: CLINIC | Age: 80
Setting detail: SPECIMEN
Discharge: HOME | End: 2020-07-31
Attending: PHYSICIAN ASSISTANT | Admitting: NURSE PRACTITIONER
Payer: MEDICARE

## 2020-07-31 ENCOUNTER — INFUSION THERAPY VISIT (OUTPATIENT)
Dept: INFUSION THERAPY | Facility: CLINIC | Age: 80
End: 2020-07-31
Attending: INTERNAL MEDICINE
Payer: MEDICARE

## 2020-07-31 ENCOUNTER — VIRTUAL VISIT (OUTPATIENT)
Dept: ONCOLOGY | Facility: CLINIC | Age: 80
End: 2020-07-31
Attending: PHYSICIAN ASSISTANT
Payer: MEDICARE

## 2020-07-31 VITALS
SYSTOLIC BLOOD PRESSURE: 134 MMHG | RESPIRATION RATE: 16 BRPM | HEART RATE: 71 BPM | DIASTOLIC BLOOD PRESSURE: 61 MMHG | OXYGEN SATURATION: 99 % | TEMPERATURE: 98.1 F

## 2020-07-31 DIAGNOSIS — D83.9 CVID (COMMON VARIABLE IMMUNODEFICIENCY) (H): ICD-10-CM

## 2020-07-31 DIAGNOSIS — R39.11 URINARY HESITANCY: Primary | ICD-10-CM

## 2020-07-31 DIAGNOSIS — D59.19 OTHER AUTOIMMUNE HEMOLYTIC ANEMIAS: Primary | ICD-10-CM

## 2020-07-31 LAB
ALBUMIN UR-MCNC: NEGATIVE MG/DL
ANISOCYTOSIS BLD QL SMEAR: ABNORMAL
APPEARANCE UR: CLEAR
BASOPHILS # BLD AUTO: 0 10E9/L (ref 0–0.2)
BASOPHILS NFR BLD AUTO: 0 %
BILIRUB UR QL STRIP: NEGATIVE
COLOR UR AUTO: NORMAL
DIFFERENTIAL METHOD BLD: ABNORMAL
EOSINOPHIL # BLD AUTO: 0 10E9/L (ref 0–0.7)
EOSINOPHIL NFR BLD AUTO: 0 %
ERYTHROCYTE [DISTWIDTH] IN BLOOD BY AUTOMATED COUNT: 20.7 % (ref 10–15)
GLUCOSE UR STRIP-MCNC: NEGATIVE MG/DL
HCT VFR BLD AUTO: 19.4 % (ref 35–47)
HGB BLD-MCNC: 6.4 G/DL (ref 11.7–15.7)
HGB UR QL STRIP: NEGATIVE
KETONES UR STRIP-MCNC: NEGATIVE MG/DL
LEUKOCYTE ESTERASE UR QL STRIP: NEGATIVE
LYMPHOCYTES # BLD AUTO: 0.8 10E9/L (ref 0.8–5.3)
LYMPHOCYTES NFR BLD AUTO: 11 %
MACROCYTES BLD QL SMEAR: PRESENT
MCH RBC QN AUTO: 39.5 PG (ref 26.5–33)
MCHC RBC AUTO-ENTMCNC: 33 G/DL (ref 31.5–36.5)
MCV RBC AUTO: 120 FL (ref 78–100)
MICROCYTES BLD QL SMEAR: PRESENT
MONOCYTES # BLD AUTO: 0.7 10E9/L (ref 0–1.3)
MONOCYTES NFR BLD AUTO: 9 %
MYELOCYTES # BLD: 0.2 10E9/L
MYELOCYTES NFR BLD MANUAL: 2 %
NEUTROPHILS # BLD AUTO: 6 10E9/L (ref 1.6–8.3)
NEUTROPHILS NFR BLD AUTO: 78 %
NITRATE UR QL: NEGATIVE
PH UR STRIP: 7 PH (ref 5–7)
PLATELET # BLD AUTO: 171 10E9/L (ref 150–450)
PLATELET # BLD EST: ABNORMAL 10*3/UL
RBC # BLD AUTO: 1.62 10E12/L (ref 3.8–5.2)
SOURCE: NORMAL
SP GR UR STRIP: 1 (ref 1–1.03)
UROBILINOGEN UR STRIP-MCNC: NORMAL MG/DL (ref 0–2)
WBC # BLD AUTO: 7.7 10E9/L (ref 4–11)

## 2020-07-31 PROCEDURE — 40001009 ZZH VIDEO/TELEPHONE VISIT; NO CHARGE

## 2020-07-31 PROCEDURE — 25000132 ZZH RX MED GY IP 250 OP 250 PS 637: Mod: GY | Performed by: NURSE PRACTITIONER

## 2020-07-31 PROCEDURE — 96375 TX/PRO/DX INJ NEW DRUG ADDON: CPT

## 2020-07-31 PROCEDURE — 99442 ZZC PHYSICIAN TELEPHONE EVALUATION 11-20 MIN: CPT | Performed by: PHYSICIAN ASSISTANT

## 2020-07-31 PROCEDURE — 96413 CHEMO IV INFUSION 1 HR: CPT

## 2020-07-31 PROCEDURE — 81003 URINALYSIS AUTO W/O SCOPE: CPT | Mod: TEL | Performed by: PHYSICIAN ASSISTANT

## 2020-07-31 PROCEDURE — 96415 CHEMO IV INFUSION ADDL HR: CPT

## 2020-07-31 PROCEDURE — 85025 COMPLETE CBC W/AUTO DIFF WBC: CPT | Performed by: NURSE PRACTITIONER

## 2020-07-31 PROCEDURE — 25800030 ZZH RX IP 258 OP 636: Performed by: NURSE PRACTITIONER

## 2020-07-31 PROCEDURE — 25000128 H RX IP 250 OP 636: Performed by: NURSE PRACTITIONER

## 2020-07-31 RX ORDER — ACETAMINOPHEN 325 MG/1
650 TABLET ORAL ONCE
Status: COMPLETED | OUTPATIENT
Start: 2020-07-31 | End: 2020-07-31

## 2020-07-31 RX ADMIN — SODIUM CHLORIDE 250 ML: 9 INJECTION, SOLUTION INTRAVENOUS at 09:12

## 2020-07-31 RX ADMIN — DIPHENHYDRAMINE HYDROCHLORIDE 50 MG: 50 INJECTION INTRAMUSCULAR; INTRAVENOUS at 09:15

## 2020-07-31 RX ADMIN — RITUXIMAB 700 MG: 10 INJECTION, SOLUTION INTRAVENOUS at 09:31

## 2020-07-31 RX ADMIN — ACETAMINOPHEN 650 MG: 325 TABLET ORAL at 09:13

## 2020-07-31 NOTE — LETTER
"    7/31/2020         RE: Tricia Sosa  [address]  [place] [address]        Dear Colleague,    Thank you for referring your patient, Tricia Sosa, to the [facility]. Please see a copy of my visit note below.    Tricia Sosa is a 79 year old female who is being evaluated via a billable telephone visit.      The patient has been notified of following:     \"This telephone visit will be conducted via a call between you and your physician/provider. We have found that certain health care needs can be provided without the need for a physical exam.  This service lets us provide the care you need with a short phone conversation.  If a prescription is necessary we can send it directly to your pharmacy.  If lab work is needed we can place an order for that and you can then stop by our lab to have the test done at a later time.    Telephone visits are billed at different rates depending on your insurance coverage. During this emergency period, for some insurers they may be billed the same as an in-person visit.  Please reach out to your insurance provider with any questions.    If during the course of the call the physician/provider feels a telephone visit is not appropriate, you will not be charged for this service.\"    Patient has given verbal consent for Telephone visit?  Yes    What phone number would you like to be contacted at? [phone]    How would you like to obtain your AVS? 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  She was previously seen by Dr. Matos, then Dr. Summers and currently under the care with Dr. Boyd. Please see her note on 7/23/2020 for details of her hematology history.    She has been intermittently on prednisone and Rituxan " for hemolytic anemia and recently saw Dr. Boyd on 7/23/2020.  Despite being on prednisone 60 mg daily, her hemoglobin dropped to 6.4 on 7/29, unfortunately.    She is here today to start rituximab infusions.     Interval History: Tricia is doing okay today.  She has been noticing some worsening dyspnea on exertion, especially with steps.  She continues on prednisone 60 mg daily and this is keeping her up at night, unfortunately.  She has had a difficult time going back to sleep, but she does nap during the day, if possible.  She has been having some urinary hesitancy that is been ongoing for many years, but she is noticing some worsening symptoms recently.  No dysuria, hematuria.  Denies fever, chills, headache or vision changes, vomiting or diarrhea or skin changes.  No other complaints.    Review of Systems: See interval hx. Denies fevers, chills, HA, dizziness, n/t, changes in vision, cough, sore throat, CP, SOB, abdominal pain, N/V, diarrhea, bleeding, bruising, rash.     PMHx and Social Hx reviewed per EPIC.      Medications:  Current Outpatient Medications   Medication Sig Dispense Refill     cyanocobalamin (VITAMIN  B-12) 1000 MCG tablet   3     folic acid (FOLVITE) 1 MG tablet   3     hydrochlorothiazide (HYDRODIURIL) 12.5 MG tablet Take 1 tablet (12.5 mg) by mouth daily 30 tablet 1     hydrocortisone butyrate (LOCOID) 0.1 % SOLN solution Apply to scalp bid prn 60 mL 11     ketoconazole (NIZORAL) 2 % external shampoo Use every other day to scalp as needed 120 mL 11     levothyroxine (SYNTHROID/LEVOTHROID) 150 MCG tablet Take 1 tablet (150 mcg) by mouth daily 90 tablet 3     potassium chloride ER (KLOR-CON M) 20 MEQ CR tablet Take 1 tablet (20 mEq) by mouth daily 90 tablet 3     predniSONE (DELTASONE) 20 MG tablet Take 3 tablets (60 mg) by mouth daily 90 tablet 0     sulfamethoxazole-trimethoprim (BACTRIM DS/SEPTRA DS) 800-160 MG tablet Take 1 pill orally on Mon, Wed and Friday 45 tablet 3     triamcinolone  (KENALOG) 0.1 % external cream        UNABLE TO FIND privigen inj monthly         Allergies   Allergen Reactions     Doxycycline        EXAM:    LMP  (LMP Unknown)  Telephone visit, therefore vital signs and exam were not performed.    Labs: PENDING    Imaging: n/a    Impression/Plan: Tricia Sosa is a 79 year old female with autoimmune hemolytic anemia and IgA deficiency, currently on prednisone 60 mg daily and IVIG, starting weekly rituximab.    Autoimmune hemolytic anemia: Currently on prednisone 60 mg daily, hemoglobin had dropped to 6.4 on 7/29.  Dr. Boyd's last note recommended starting rituximab I spoke to Dr. Alvarado, and Dr. Boyd's absence, plan for weekly rituximab x4.  We discussed, toxicities today and she has received rituximab in the past and questions were answered.  She will continue prednisone 60 mg daily. I will reach out to Dr. Boyd next week to follow CBC and see if she would recommend prednisone taper.     WARD: Likely secondary to anemia.  No acute symptoms, but will monitor.  If symptoms worsen over the weekend with chest pain and worsening WARD, recommended ED evaluation.    Urinary hesitancy: This is been ongoing for 20+ years, but worsening over the last 1 to 2 weeks.  We will obtain UA to look for UTI.  If no evidence of infection, she will monitor symptoms and establish care with urology if needed.    Phone call duration: 13 minutes    Chart documentation with Dragon Voice recognition Software. Although reviewed after completion, some words and grammatical errors may remain.      Trinidad Liu PA-C  Hematology/Oncology  [facility]                  Again, thank you for allowing me to participate in the care of your patient.        Sincerely,        Trinidad Liu PA-C

## 2020-07-31 NOTE — LETTER
"    7/31/2020         RE: Tricia Sosa  [address]  [facility] [address]        Dear Colleague,    Thank you for referring your patient, Tricia Sosa, to the [facility]. Please see a copy of my visit note below.    Tricia Sosa is a 79 year old female who is being evaluated via a billable telephone visit.      The patient has been notified of following:     \"This telephone visit will be conducted via a call between you and your physician/provider. We have found that certain health care needs can be provided without the need for a physical exam.  This service lets us provide the care you need with a short phone conversation.  If a prescription is necessary we can send it directly to your pharmacy.  If lab work is needed we can place an order for that and you can then stop by our lab to have the test done at a later time.    Telephone visits are billed at different rates depending on your insurance coverage. During this emergency period, for some insurers they may be billed the same as an in-person visit.  Please reach out to your insurance provider with any questions.    If during the course of the call the physician/provider feels a telephone visit is not appropriate, you will not be charged for this service.\"    Patient has given verbal consent for Telephone visit?  Yes    What phone number would you like to be contacted at? [phone]    How would you like to obtain your AVS? 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  She was previously seen by Dr. Matos, then Dr. Summers and currently under the care with Dr. Boyd. Please see her note on 7/23/2020 for details of her hematology history.    She has been intermittently on prednisone and Rituxan " for hemolytic anemia and recently saw Dr. Boyd on 7/23/2020.  Despite being on prednisone 60 mg daily, her hemoglobin dropped to 6.4 on 7/29, unfortunately.    She is here today to start rituximab infusions.     Interval History: Tricia is doing okay today.  She has been noticing some worsening dyspnea on exertion, especially with steps.  She continues on prednisone 60 mg daily and this is keeping her up at night, unfortunately.  She has had a difficult time going back to sleep, but she does nap during the day, if possible.  She has been having some urinary hesitancy that is been ongoing for many years, but she is noticing some worsening symptoms recently.  No dysuria, hematuria.  Denies fever, chills, headache or vision changes, vomiting or diarrhea or skin changes.  No other complaints.    Review of Systems: See interval hx. Denies fevers, chills, HA, dizziness, n/t, changes in vision, cough, sore throat, CP, SOB, abdominal pain, N/V, diarrhea, bleeding, bruising, rash.     PMHx and Social Hx reviewed per EPIC.      Medications:  Current Outpatient Medications   Medication Sig Dispense Refill     cyanocobalamin (VITAMIN  B-12) 1000 MCG tablet   3     folic acid (FOLVITE) 1 MG tablet   3     hydrochlorothiazide (HYDRODIURIL) 12.5 MG tablet Take 1 tablet (12.5 mg) by mouth daily 30 tablet 1     hydrocortisone butyrate (LOCOID) 0.1 % SOLN solution Apply to scalp bid prn 60 mL 11     ketoconazole (NIZORAL) 2 % external shampoo Use every other day to scalp as needed 120 mL 11     levothyroxine (SYNTHROID/LEVOTHROID) 150 MCG tablet Take 1 tablet (150 mcg) by mouth daily 90 tablet 3     potassium chloride ER (KLOR-CON M) 20 MEQ CR tablet Take 1 tablet (20 mEq) by mouth daily 90 tablet 3     predniSONE (DELTASONE) 20 MG tablet Take 3 tablets (60 mg) by mouth daily 90 tablet 0     sulfamethoxazole-trimethoprim (BACTRIM DS/SEPTRA DS) 800-160 MG tablet Take 1 pill orally on Mon, Wed and Friday 45 tablet 3     triamcinolone  (KENALOG) 0.1 % external cream        UNABLE TO FIND privigen inj monthly         Allergies   Allergen Reactions     Doxycycline        EXAM:    LMP  (LMP Unknown)  Telephone visit, therefore vital signs and exam were not performed.    Labs: PENDING    Imaging: n/a    Impression/Plan: Tricia Sosa is a 79 year old female with autoimmune hemolytic anemia and IgA deficiency, currently on prednisone 60 mg daily and IVIG, starting weekly rituximab.    Autoimmune hemolytic anemia: Currently on prednisone 60 mg daily, hemoglobin had dropped to 6.4 on 7/29.  Dr. Boyd's last note recommended starting rituximab I spoke to Dr. Alvarado, and Dr. Boyd's absence, plan for weekly rituximab x4.  We discussed, toxicities today and she has received rituximab in the past and questions were answered.  She will continue prednisone 60 mg daily. I will reach out to Dr. Boyd next week to follow CBC and see if she would recommend prednisone taper.     WARD: Likely secondary to anemia.  No acute symptoms, but will monitor.  If symptoms worsen over the weekend with chest pain and worsening WARD, recommended ED evaluation.    Urinary hesitancy: This is been ongoing for 20+ years, but worsening over the last 1 to 2 weeks.  We will obtain UA to look for UTI.  If no evidence of infection, she will monitor symptoms and establish care with urology if needed.    Phone call duration: 13 minutes    Chart documentation with Dragon Voice recognition Software. Although reviewed after completion, some words and grammatical errors may remain.      Trinidad Liu PA-C  Hematology/Oncology  [facility]                  Again, thank you for allowing me to participate in the care of your patient.        Sincerely,        Trinidad Liu PA-C

## 2020-07-31 NOTE — PATIENT INSTRUCTIONS
Rituxan given today.  UA obtained today.  8/7/20, 8/14/20 and 8/21/20: Rituxan.  8/13/20: Telephone Visit with Trinidad Liu PA-C.  8/25/20: Telephone Visit with Dr. Boyd.  Fadumo Caldera RN, BSN, OCN on 7/31/2020 at 4:13 PM

## 2020-07-31 NOTE — PROGRESS NOTES
"Infusion Nursing Note:  Tricia Sosa presents today for rituxan.    Patient seen by provider today: Yes: Trinidad per phone   present during visit today: Not Applicable.    Note: unable to sleep well. On Prednisone 60 mg daily.    Ambulated to restroom and back, feeling SOB (not unusual) and \"completely exhausted\". Sl nausea.  BP into 160's, with steady HR.  BP down to usual within 30 minutes. Rituxan stopped at 1030 for 30 minutes while pt recuperating, had a granola bar, then restarted at 50/ hr and titrated up every 30 minutes    Intravenous Access:  Lab draw site L arm, Needle type piv, Gauge 24.  Labs drawn without difficulty.  Peripheral IV placed.    Treatment Conditions:  Lab Results   Component Value Date    HGB 6.4 07/31/2020     Lab Results   Component Value Date    WBC 7.7 07/31/2020      Lab Results   Component Value Date    ANEU 6.0 07/31/2020     Lab Results   Component Value Date     07/31/2020      Results reviewed, labs MET treatment parameters, ok to proceed with treatment.  Biological Infusion Checklist:  ~~~ NOTE: If the patient answers yes to any of the questions below, hold the infusion and contact ordering provider or on-call provider.    1. Have you recently had an elevated temperature, fever, chills, productive cough, coughing for 3 weeks or longer or hemoptysis, abnormal vital signs, night sweats,  chest pain or have you noticed a decrease in your appetite, unexplained weight loss or fatigue? No  2. Do you have any open wounds or new incisions? No  3. Do you have any recent or upcoming hospitalizations, surgeries or dental procedures? No  4. Do you currently have or recently have had any signs of illness or infection or are you on any antibiotics? No  5. Have you had any new, sudden or worsening abdominal pain? No  6. Have you or anyone in your household received a live vaccination in the past 4 weeks? Please note:  No live vaccines while on biologic/chemotherapy until 6 " months after the last treatment.  Patient can receive the flu vaccine (shot only) and the pneumovax.  It is optimal for the patient to get these vaccines mid cycle, but they can be given at any time as long as it is not on the day of the infusion. No  7. Have you recently been diagnosed with any new nervous system diseases (ie. Multiple sclerosis, Guillain [place], seizures, neurological changes) or cancer diagnosis? No  8. Are you on any form of radiation or chemotherapy? No  9. Are you pregnant or breast feeding or do you have plans of pregnancy in the future? No  10. Have you been having any signs of worsening depression or suicidal ideations?  (benlysta only) No  11. Have there been any other new onset medical symptoms? No        Post Infusion Assessment:  Patient tolerated infusion without incident after trjip to restroom and back.  Blood return noted pre and post infusion.  Site patent and intact, free from redness, edema or discomfort.  No evidence of extravasations.  Access discontinued per protocol.    Biologic Infusion Post Education:   Call the triage nurse at your clinic or seek medical attention if you have chills and/or temperature greater than or equal to 100.5, uncontrolled nausea/vomiting, diarrhea, constipation, dizziness, shortness of breath, chest pain, heart palpitations, weakness or any other new or concerning symptoms, questions or concerns.    You cannot have any live virus vaccines prior to or during treatment or up to 6 months post infusion.    If you have an upcoming surgery, medical procedure or dental procedure during treatment, this should be discussed with your ordering physician and your surgeon/dentist.    If you are having any concerning symptom, if you are unsure if you should get your next infusion or wish to speak to a provider before your next infusion, please call your care coordinator or triage nurse at your clinic to notify them so we can adequately serve you.       Discharge  Plan:   Discharge instructions reviewed with: Patient.  Patient and/or family verbalized understanding of discharge instructions and all questions answered.  AVS to patient via [employer].  Patient will return 8/7 for next appointment.   Patient discharged in stable condition accompanied by: self.  Departure Mode: Ambulatory with cane.    Serina Otto RN

## 2020-08-03 ENCOUNTER — PATIENT OUTREACH (OUTPATIENT)
Dept: ONCOLOGY | Facility: CLINIC | Age: 80
End: 2020-08-03

## 2020-08-03 NOTE — PROGRESS NOTES
Oliver, Sabina Morocho MD  You;  Cancer Clinic Rn Pool 53 minutes ago (8:52 AM)       The jaundice is likely from the elevated bilirubin, which is due to the hemolytic anemia.     I see that she has labs scheduled for 8/7.  This could be moved up to earlier in the week to see if they are worsening.      Writer contacted Tricia and notified her of Dr. Boyd's message. Per Dr. Boyd if she does decide to come in for early labs, just draw standing labs. Will need repeat labs on the day of her rituxan infusion.     Tricia verbalized understanding. She reports she wants to just monitor it for now and will call us if she wants to come in for labs. She will keep us updated on any new symptoms especially any new SOB, chest pain, fever, dizziness, light-headedness, syncope. Tricia had no further questions or concerns at this time.     Anabell Hawkins, RN, BSN, MAOM  Patient Care Coordinator  [facility]  [phone]

## 2020-08-03 NOTE — PROGRESS NOTES
S-(situation): Tricia called in to clinic reporting her son noticed yesterday she is appearing jaundiced    B-(background): Tricia just had Rituxan infusion on Friday, 7/31 for hemolytic anemia    A-(assessment): Tricia reports it is just her skin that is jaundiced. Her eyes do not appear jaundice presently. Her only other complaint is she is very tired due to poor sleep. She states she believes this is because of the prednisone.     She reports her BMs have been somewhat normal. She did not have a BM on Thurs or Fri last week but did have BMs on Saturday 8/1, and Sunday 8/2. She reports the stool started off formed and then was more mushy. The color was WNL.     R-(recommendations): Writer will notify Dr. Boyd and call Tricia back with her recommendations.     Anabell Hawkins RN, BSN, MAOM  Patient Care Coordinator  [facility]  [phone]

## 2020-08-04 ENCOUNTER — PATIENT OUTREACH (OUTPATIENT)
Dept: ONCOLOGY | Facility: CLINIC | Age: 80
End: 2020-08-04

## 2020-08-04 NOTE — PROGRESS NOTES
Trinidad Chahal PA-C Anderson, Amanda E, RN Amanda,     Can you please call pt and update her? We will keep pred 60mg daily as is for now.     Trinidad    Previous Messages     ----- Message -----   From: Sabina Boyd MD   Sent: 8/4/2020  10:49 AM CDT   To: Trinidad Oden PA-C   Subject: RE: prednisone                                   No, let's not taper yet until we see an improvement in her hemoglobin.  Thanks             Writer called and notified Tricia to stay on her 60mg Prednisone until further notice, until we see an improvement in her hgb. Tricia verbalized understanding and is in agreement with the plan.    Anabell Hawkins, RN, BSN, MAOM  Patient Care Coordinator  [facility]  [phone]

## 2020-08-07 ENCOUNTER — HOSPITAL ENCOUNTER (OUTPATIENT)
Facility: CLINIC | Age: 80
Setting detail: SPECIMEN
Discharge: HOME | End: 2020-08-07
Attending: INTERNAL MEDICINE | Admitting: NURSE PRACTITIONER
Payer: MEDICARE

## 2020-08-07 ENCOUNTER — INFUSION THERAPY VISIT (OUTPATIENT)
Dept: INFUSION THERAPY | Facility: CLINIC | Age: 80
End: 2020-08-07
Attending: INTERNAL MEDICINE
Payer: MEDICARE

## 2020-08-07 VITALS
RESPIRATION RATE: 16 BRPM | BODY MASS INDEX: 19.91 KG/M2 | HEART RATE: 69 BPM | TEMPERATURE: 98.4 F | DIASTOLIC BLOOD PRESSURE: 66 MMHG | WEIGHT: 146.83 LBS | OXYGEN SATURATION: 99 % | SYSTOLIC BLOOD PRESSURE: 131 MMHG

## 2020-08-07 DIAGNOSIS — D59.19 OTHER AUTOIMMUNE HEMOLYTIC ANEMIAS: Primary | ICD-10-CM

## 2020-08-07 DIAGNOSIS — D83.9 CVID (COMMON VARIABLE IMMUNODEFICIENCY) (H): ICD-10-CM

## 2020-08-07 LAB
BASOPHILS # BLD AUTO: 0 10E9/L (ref 0–0.2)
BASOPHILS NFR BLD AUTO: 0.1 %
DIFFERENTIAL METHOD BLD: ABNORMAL
EOSINOPHIL # BLD AUTO: 0 10E9/L (ref 0–0.7)
EOSINOPHIL NFR BLD AUTO: 0.3 %
ERYTHROCYTE [DISTWIDTH] IN BLOOD BY AUTOMATED COUNT: 16 % (ref 10–15)
HCT VFR BLD AUTO: 22.5 % (ref 35–47)
HGB BLD-MCNC: 7.4 G/DL (ref 11.7–15.7)
IMM GRANULOCYTES # BLD: 0.1 10E9/L (ref 0–0.4)
IMM GRANULOCYTES NFR BLD: 0.6 %
LYMPHOCYTES # BLD AUTO: 0.6 10E9/L (ref 0.8–5.3)
LYMPHOCYTES NFR BLD AUTO: 7.1 %
MCH RBC QN AUTO: 37 PG (ref 26.5–33)
MCHC RBC AUTO-ENTMCNC: 32.9 G/DL (ref 31.5–36.5)
MCV RBC AUTO: 113 FL (ref 78–100)
MONOCYTES # BLD AUTO: 0.7 10E9/L (ref 0–1.3)
MONOCYTES NFR BLD AUTO: 9.3 %
NEUTROPHILS # BLD AUTO: 6.4 10E9/L (ref 1.6–8.3)
NEUTROPHILS NFR BLD AUTO: 82.6 %
NRBC # BLD AUTO: 0 10*3/UL
NRBC BLD AUTO-RTO: 0 /100
PLATELET # BLD AUTO: 118 10E9/L (ref 150–450)
RBC # BLD AUTO: 2 10E12/L (ref 3.8–5.2)
WBC # BLD AUTO: 7.8 10E9/L (ref 4–11)

## 2020-08-07 PROCEDURE — 96375 TX/PRO/DX INJ NEW DRUG ADDON: CPT

## 2020-08-07 PROCEDURE — 96415 CHEMO IV INFUSION ADDL HR: CPT

## 2020-08-07 PROCEDURE — 25800030 ZZH RX IP 258 OP 636: Performed by: NURSE PRACTITIONER

## 2020-08-07 PROCEDURE — 96413 CHEMO IV INFUSION 1 HR: CPT

## 2020-08-07 PROCEDURE — 85025 COMPLETE CBC W/AUTO DIFF WBC: CPT | Performed by: NURSE PRACTITIONER

## 2020-08-07 PROCEDURE — 25000132 ZZH RX MED GY IP 250 OP 250 PS 637: Mod: GY | Performed by: NURSE PRACTITIONER

## 2020-08-07 PROCEDURE — 25000128 H RX IP 250 OP 636: Performed by: NURSE PRACTITIONER

## 2020-08-07 RX ORDER — ACETAMINOPHEN 325 MG/1
650 TABLET ORAL ONCE
Status: COMPLETED | OUTPATIENT
Start: 2020-08-07 | End: 2020-08-07

## 2020-08-07 RX ADMIN — DIPHENHYDRAMINE HYDROCHLORIDE 50 MG: 50 INJECTION INTRAMUSCULAR; INTRAVENOUS at 11:26

## 2020-08-07 RX ADMIN — RITUXIMAB 700 MG: 10 INJECTION, SOLUTION INTRAVENOUS at 11:47

## 2020-08-07 RX ADMIN — SODIUM CHLORIDE 250 ML: 9 INJECTION, SOLUTION INTRAVENOUS at 11:26

## 2020-08-07 RX ADMIN — ACETAMINOPHEN 650 MG: 325 TABLET ORAL at 11:26

## 2020-08-07 NOTE — PROGRESS NOTES
Infusion Nursing Note:  Tricia Sosa presents today for Day 8, Cycle 2 Rituxan, premeds, labs.    Patient seen by provider today: No   present during visit today: Not Applicable.    Note: Patient stating she has not felt the best recently. She feels fatigued, light headed on occasion (not today and only when up and moving), and slightly weak.    Infusion started at 50 mg/hr x 30 minutes and increased by 50 mg/hr every 30 minutes to a max of 400 mg/hr. Patient's Rituxan had to be stopped (7/31/20) during her last infusion due to not feeling well (see nursing note), therefore writer did not speed her up this time.    Intravenous Access:  Peripheral IV placed.    Treatment Conditions:  Lab Results   Component Value Date    HGB 7.4 08/07/2020     Lab Results   Component Value Date    WBC 7.8 08/07/2020      Lab Results   Component Value Date    ANEU 6.4 08/07/2020     Lab Results   Component Value Date     08/07/2020      Results reviewed, labs MET treatment parameters, ok to proceed with treatment.    Biological Infusion Checklist:  ~~~ NOTE: If the patient answers yes to any of the questions below, hold the infusion and contact ordering provider or on-call provider.    1. Have you recently had an elevated temperature, fever, chills, productive cough, coughing for 3 weeks or longer or hemoptysis, abnormal vital signs, night sweats,  chest pain or have you noticed a decrease in your appetite, unexplained weight loss or fatigue? No  2. Do you have any open wounds or new incisions? No  3. Do you have any recent or upcoming hospitalizations, surgeries or dental procedures? No  4. Do you currently have or recently have had any signs of illness or infection or are you on any antibiotics? No  5. Have you had any new, sudden or worsening abdominal pain? No  6. Have you or anyone in your household received a live vaccination in the past 4 weeks? Please note:  No live vaccines while on biologic/chemotherapy  until 6 months after the last treatment.  Patient can receive the flu vaccine (shot only) and the pneumovax.  It is optimal for the patient to get these vaccines mid cycle, but they can be given at any time as long as it is not on the day of the infusion. No  7. Have you recently been diagnosed with any new nervous system diseases (ie. Multiple sclerosis, Guillain [place], seizures, neurological changes) or cancer diagnosis? No  8. Are you on any form of radiation or chemotherapy? No  9. Are you pregnant or breast feeding or do you have plans of pregnancy in the future? No  10. Have you been having any signs of worsening depression or suicidal ideations?  (benlysta only) No  11. Have there been any other new onset medical symptoms? No        Post Infusion Assessment:  Patient tolerated infusion without incident.  Blood return noted pre and post infusion.  Site patent and intact, free from redness, edema or discomfort.  No evidence of extravasations.  Access discontinued per protocol.  Biologic Infusion Post Education: Call the triage nurse at your clinic or seek medical attention if you have chills and/or temperature greater than or equal to 100.5, uncontrolled nausea/vomiting, diarrhea, constipation, dizziness, shortness of breath, chest pain, heart palpitations, weakness or any other new or concerning symptoms, questions or concerns.  You cannot have any live virus vaccines prior to or during treatment or up to 6 months post infusion.  If you have an upcoming surgery, medical procedure or dental procedure during treatment, this should be discussed with your ordering physician and your surgeon/dentist.  If you are having any concerning symptom, if you are unsure if you should get your next infusion or wish to speak to a provider before your next infusion, please call your care coordinator or triage nurse at your clinic to notify them so we can adequately serve you.       Discharge Plan:   Discharge instructions  reviewed with: Patient.  Patient and/or family verbalized understanding of discharge instructions and all questions answered.  AVS to patient via [employer].  Patient will return 8/13/20 for telephone visit with AMOS Lucia and then on 8/14/20 patient will return for her next Rituxan.  Patient discharged in stable condition accompanied by: self.  Departure Mode: Ambulatory.    Pari Mota RN

## 2020-08-13 ENCOUNTER — VIRTUAL VISIT (OUTPATIENT)
Dept: ONCOLOGY | Facility: CLINIC | Age: 80
End: 2020-08-13
Attending: PHYSICIAN ASSISTANT
Payer: MEDICARE

## 2020-08-13 DIAGNOSIS — D59.19 OTHER AUTOIMMUNE HEMOLYTIC ANEMIAS: ICD-10-CM

## 2020-08-13 PROCEDURE — 40001009 ZZH VIDEO/TELEPHONE VISIT; NO CHARGE

## 2020-08-13 PROCEDURE — 99441 ZZC PHYSICIAN TELEPHONE EVALUATION 5-10 MIN: CPT | Performed by: PHYSICIAN ASSISTANT

## 2020-08-13 RX ORDER — PREDNISONE 20 MG/1
60 TABLET ORAL DAILY
Qty: 90 TABLET | Refills: 0 | Status: CANCELLED | OUTPATIENT
Start: 2020-08-13

## 2020-08-13 RX ORDER — PREDNISONE 10 MG/1
10 TABLET ORAL DAILY
Qty: 80 TABLET | Refills: 0 | Status: SHIPPED | OUTPATIENT
Start: 2020-08-13 | End: 2020-11-13

## 2020-08-13 NOTE — LETTER
"    8/13/2020         RE: Tricia Sosa  [address]  [facility] [address]        Dear Colleague,    Thank you for referring your patient, Tricia Sosa, to the [facility]. Please see a copy of my visit note below.    Tricia Sosa is a 79 year old female who is being evaluated via a billable telephone visit.      The patient has been notified of following:     \"This telephone visit will be conducted via a call between you and your physician/provider. We have found that certain health care needs can be provided without the need for a physical exam.  This service lets us provide the care you need with a short phone conversation.  If a prescription is necessary we can send it directly to your pharmacy.  If lab work is needed we can place an order for that and you can then stop by our lab to have the test done at a later time.    Telephone visits are billed at different rates depending on your insurance coverage. During this emergency period, for some insurers they may be billed the same as an in-person visit.  Please reach out to your insurance provider with any questions.    If during the course of the call the physician/provider feels a telephone visit is not appropriate, you will not be charged for this service.\"    Patient has given verbal consent for Telephone visit?  Yes    What phone number would you like to be contacted at? [phone]     How would you like to obtain your AVS? MyChart     - since starting Rituxin, pt has been going 2-4 days with out having BM's. States it doesn't bother her until it gets to the 3rd day.   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  She was previously seen by Dr. Matos, then Dr. Summers and currently under the care with Dr. Boyd. " Please see her note on 7/23/2020 for details of her hematology history.    She has been intermittently on prednisone and Rituxan for hemolytic anemia and recently saw Dr. Boyd on 7/23/2020.  Despite being on prednisone 60 mg daily, her hemoglobin dropped to 6.4 on 7/29, unfortunately.  She was started on weekly Rituxan infusions with treatment #1 on 7/31/2020 and #2 on 8/7/2020.  Her hemoglobin had improved to 7.4 prior to #2.    Telephone visit today for close follow-up and Rituxan.    Interval History: Tricia is doing really well today.  She continues on prednisone 60 mg and is tolerated 2 doses of the Rituxan so far.  Her dyspnea is better and her urinary symptoms are also better.  She has noticed some constipation over the last few weeks. They are comfortable the past, just less frequent than what she is used to.  No headache or vision changes, bleeding.  Her sleeping is better.    Review of Systems: See interval hx. Denies fevers, chills, HA, dizziness, n/t, changes in vision, cough, sore throat, CP, SOB, abdominal pain, N/V, diarrhea, changes in urination, bleeding, bruising, rash.      PMHx and Social Hx reviewed per EPIC.      Medications:  Current Outpatient Medications   Medication Sig Dispense Refill     cyanocobalamin (VITAMIN  B-12) 1000 MCG tablet   3     folic acid (FOLVITE) 1 MG tablet   3     hydrochlorothiazide (HYDRODIURIL) 12.5 MG tablet Take 1 tablet (12.5 mg) by mouth daily 30 tablet 1     hydrocortisone butyrate (LOCOID) 0.1 % SOLN solution Apply to scalp bid prn 60 mL 11     ketoconazole (NIZORAL) 2 % external shampoo Use every other day to scalp as needed 120 mL 11     levothyroxine (SYNTHROID/LEVOTHROID) 150 MCG tablet Take 1 tablet (150 mcg) by mouth daily 90 tablet 3     potassium chloride ER (KLOR-CON M) 20 MEQ CR tablet Take 1 tablet (20 mEq) by mouth daily 90 tablet 3     predniSONE (DELTASONE) 20 MG tablet Take 3 tablets (60 mg) by mouth daily 90 tablet 0      sulfamethoxazole-trimethoprim (BACTRIM DS/SEPTRA DS) 800-160 MG tablet Take 1 pill orally on Mon, Wed and Friday 45 tablet 3     triamcinolone (KENALOG) 0.1 % external cream        UNABLE TO FIND privigen inj monthly         Allergies   Allergen Reactions     Doxycycline        EXAM:    LMP  (LMP Unknown)  Telephone visit, therefore vital signs and exam were not performed.    Labs: PENDING    Imaging: n/a    Impression/Plan: Tricia Sosa is a 79 year old female with autoimmune hemolytic anemia and IgA deficiency, previously on prednisone 60 mg and IVIG, currently ob weekly rituximab.    Autoimmune hemolytic anemia: Hemoglobin had dropped to 6.4, she started weekly Rituxan infusions and received 2 doses, most recently on 8/7/2020.  Her hemoglobin improved to 7.4 after just 1 treatment.  With this, Dr. Boyd recommends tapering off prednisone we will start with a slow taper with 10 mg each week.  I have sent a prescription for prednisone 10 mg and she will start taking 50 mg tomorrow morning daily x 7 days, then 40 mg daily x7 days, 30 mg x 7 days, 20 mg x 7 days and finally 10 mg x 7 days.  This is explained to her today. She will return tomorrow for labs and Rituxan treatment #3.  She will call the clinic with any questions or concerns.  --8/21 Rituxan #4  --8/25 Dr. Boyd    WARD: Likely secondary to anemia.  Overall, symptoms have improved.    Urinary hesitancy: This is been ongoing for 20+ years, but worsened during 2 weeks.  Symptoms are improved.    Phone call duration: 7 minutes    Chart documentation with Dragon Voice recognition Software. Although reviewed after completion, some words and grammatical errors may remain.      Trinidad Liu PA-C  Hematology/Oncology  [facility]                      Again, thank you for allowing me to participate in the care of your patient.        Sincerely,        Trinidad Liu PA-C

## 2020-08-13 NOTE — PROGRESS NOTES
"Tricia Sosa is a 79 year old female who is being evaluated via a billable telephone visit.      The patient has been notified of following:     \"This telephone visit will be conducted via a call between you and your physician/provider. We have found that certain health care needs can be provided without the need for a physical exam.  This service lets us provide the care you need with a short phone conversation.  If a prescription is necessary we can send it directly to your pharmacy.  If lab work is needed we can place an order for that and you can then stop by our lab to have the test done at a later time.    Telephone visits are billed at different rates depending on your insurance coverage. During this emergency period, for some insurers they may be billed the same as an in-person visit.  Please reach out to your insurance provider with any questions.    If during the course of the call the physician/provider feels a telephone visit is not appropriate, you will not be charged for this service.\"    Patient has given verbal consent for Telephone visit?  Yes    What phone number would you like to be contacted at? [phone]     How would you like to obtain your AVS? MyChart     - since starting Rituxin, pt has been going 2-4 days with out having BM's. States it doesn't bother her until it gets to the 3rd day.   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  She was previously seen by Dr. Matos, then Dr. Summers and currently under the care with Dr. Boyd. Please see her note on 7/23/2020 for details of her hematology history.    She has been intermittently on prednisone and Rituxan for hemolytic anemia and recently saw Dr. Boyd on 7/23/2020.  Despite being on prednisone 60 mg daily, her " hemoglobin dropped to 6.4 on 7/29, unfortunately.  She was started on weekly Rituxan infusions with treatment #1 on 7/31/2020 and #2 on 8/7/2020.  Her hemoglobin had improved to 7.4 prior to #2.    Telephone visit today for close follow-up and Rituxan.    Interval History: Tricia is doing really well today.  She continues on prednisone 60 mg and is tolerated 2 doses of the Rituxan so far.  Her dyspnea is better and her urinary symptoms are also better.  She has noticed some constipation over the last few weeks. They are comfortable the past, just less frequent than what she is used to.  No headache or vision changes, bleeding.  Her sleeping is better.    Review of Systems: See interval hx. Denies fevers, chills, HA, dizziness, n/t, changes in vision, cough, sore throat, CP, SOB, abdominal pain, N/V, diarrhea, changes in urination, bleeding, bruising, rash.      PMHx and Social Hx reviewed per EPIC.      Medications:  Current Outpatient Medications   Medication Sig Dispense Refill     cyanocobalamin (VITAMIN  B-12) 1000 MCG tablet   3     folic acid (FOLVITE) 1 MG tablet   3     hydrochlorothiazide (HYDRODIURIL) 12.5 MG tablet Take 1 tablet (12.5 mg) by mouth daily 30 tablet 1     hydrocortisone butyrate (LOCOID) 0.1 % SOLN solution Apply to scalp bid prn 60 mL 11     ketoconazole (NIZORAL) 2 % external shampoo Use every other day to scalp as needed 120 mL 11     levothyroxine (SYNTHROID/LEVOTHROID) 150 MCG tablet Take 1 tablet (150 mcg) by mouth daily 90 tablet 3     potassium chloride ER (KLOR-CON M) 20 MEQ CR tablet Take 1 tablet (20 mEq) by mouth daily 90 tablet 3     predniSONE (DELTASONE) 20 MG tablet Take 3 tablets (60 mg) by mouth daily 90 tablet 0     sulfamethoxazole-trimethoprim (BACTRIM DS/SEPTRA DS) 800-160 MG tablet Take 1 pill orally on Mon, Wed and Friday 45 tablet 3     triamcinolone (KENALOG) 0.1 % external cream        UNABLE TO FIND privigen inj monthly         Allergies   Allergen Reactions      Doxycycline        EXAM:    LMP  (LMP Unknown)  Telephone visit, therefore vital signs and exam were not performed.    Labs: PENDING    Imaging: n/a    Impression/Plan: Tricia Sosa is a 79 year old female with autoimmune hemolytic anemia and IgA deficiency, previously on prednisone 60 mg and IVIG, currently ob weekly rituximab.    Autoimmune hemolytic anemia: Hemoglobin had dropped to 6.4, she started weekly Rituxan infusions and received 2 doses, most recently on 8/7/2020.  Her hemoglobin improved to 7.4 after just 1 treatment.  With this, Dr. Boyd recommends tapering off prednisone we will start with a slow taper with 10 mg each week.  I have sent a prescription for prednisone 10 mg and she will start taking 50 mg tomorrow morning daily x 7 days, then 40 mg daily x7 days, 30 mg x 7 days, 20 mg x 7 days and finally 10 mg x 7 days.  This is explained to her today. She will return tomorrow for labs and Rituxan treatment #3.  She will call the clinic with any questions or concerns.  --8/21 Rituxan #4  --8/25 Dr. Boyd    WARD: Likely secondary to anemia.  Overall, symptoms have improved.    Urinary hesitancy: This is been ongoing for 20+ years, but worsened during 2 weeks.  Symptoms are improved.    Phone call duration: 7 minutes    Chart documentation with Dragon Voice recognition Software. Although reviewed after completion, some words and grammatical errors may remain.      Trinidad Liu PA-C  Hematology/Oncology  [facility]

## 2020-08-13 NOTE — LETTER
"    8/13/2020         RE: Tricia Sosa  [address]  [place] [address]        Dear Colleague,    Thank you for referring your patient, Tricia Sosa, to the [facility]. Please see a copy of my visit note below.    Tricia Sosa is a 79 year old female who is being evaluated via a billable telephone visit.      The patient has been notified of following:     \"This telephone visit will be conducted via a call between you and your physician/provider. We have found that certain health care needs can be provided without the need for a physical exam.  This service lets us provide the care you need with a short phone conversation.  If a prescription is necessary we can send it directly to your pharmacy.  If lab work is needed we can place an order for that and you can then stop by our lab to have the test done at a later time.    Telephone visits are billed at different rates depending on your insurance coverage. During this emergency period, for some insurers they may be billed the same as an in-person visit.  Please reach out to your insurance provider with any questions.    If during the course of the call the physician/provider feels a telephone visit is not appropriate, you will not be charged for this service.\"    Patient has given verbal consent for Telephone visit?  Yes    What phone number would you like to be contacted at? [phone]     How would you like to obtain your AVS? MyChart     - since starting Rituxin, pt has been going 2-4 days with out having BM's. States it doesn't bother her until it gets to the 3rd day.   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  She was previously seen by Dr. Matos, then Dr. Summers and currently under the care with Dr. Boyd. " Please see her note on 7/23/2020 for details of her hematology history.    She has been intermittently on prednisone and Rituxan for hemolytic anemia and recently saw Dr. Boyd on 7/23/2020.  Despite being on prednisone 60 mg daily, her hemoglobin dropped to 6.4 on 7/29, unfortunately.  She was started on weekly Rituxan infusions with treatment #1 on 7/31/2020 and #2 on 8/7/2020.  Her hemoglobin had improved to 7.4 prior to #2.    Telephone visit today for close follow-up and Rituxan.    Interval History: Tricia is doing really well today.  She continues on prednisone 60 mg and is tolerated 2 doses of the Rituxan so far.  Her dyspnea is better and her urinary symptoms are also better.  She has noticed some constipation over the last few weeks. They are comfortable the past, just less frequent than what she is used to.  No headache or vision changes, bleeding.  Her sleeping is better.    Review of Systems: See interval hx. Denies fevers, chills, HA, dizziness, n/t, changes in vision, cough, sore throat, CP, SOB, abdominal pain, N/V, diarrhea, changes in urination, bleeding, bruising, rash.      PMHx and Social Hx reviewed per EPIC.      Medications:  Current Outpatient Medications   Medication Sig Dispense Refill     cyanocobalamin (VITAMIN  B-12) 1000 MCG tablet   3     folic acid (FOLVITE) 1 MG tablet   3     hydrochlorothiazide (HYDRODIURIL) 12.5 MG tablet Take 1 tablet (12.5 mg) by mouth daily 30 tablet 1     hydrocortisone butyrate (LOCOID) 0.1 % SOLN solution Apply to scalp bid prn 60 mL 11     ketoconazole (NIZORAL) 2 % external shampoo Use every other day to scalp as needed 120 mL 11     levothyroxine (SYNTHROID/LEVOTHROID) 150 MCG tablet Take 1 tablet (150 mcg) by mouth daily 90 tablet 3     potassium chloride ER (KLOR-CON M) 20 MEQ CR tablet Take 1 tablet (20 mEq) by mouth daily 90 tablet 3     predniSONE (DELTASONE) 20 MG tablet Take 3 tablets (60 mg) by mouth daily 90 tablet 0      sulfamethoxazole-trimethoprim (BACTRIM DS/SEPTRA DS) 800-160 MG tablet Take 1 pill orally on Mon, Wed and Friday 45 tablet 3     triamcinolone (KENALOG) 0.1 % external cream        UNABLE TO FIND privigen inj monthly         Allergies   Allergen Reactions     Doxycycline        EXAM:    LMP  (LMP Unknown)  Telephone visit, therefore vital signs and exam were not performed.    Labs: PENDING    Imaging: n/a    Impression/Plan: Tricia Sosa is a 79 year old female with autoimmune hemolytic anemia and IgA deficiency, previously on prednisone 60 mg and IVIG, currently ob weekly rituximab.    Autoimmune hemolytic anemia: Hemoglobin had dropped to 6.4, she started weekly Rituxan infusions and received 2 doses, most recently on 8/7/2020.  Her hemoglobin improved to 7.4 after just 1 treatment.  With this, Dr. Boyd recommends tapering off prednisone we will start with a slow taper with 10 mg each week.  I have sent a prescription for prednisone 10 mg and she will start taking 50 mg tomorrow morning daily x 7 days, then 40 mg daily x7 days, 30 mg x 7 days, 20 mg x 7 days and finally 10 mg x 7 days.  This is explained to her today. She will return tomorrow for labs and Rituxan treatment #3.  She will call the clinic with any questions or concerns.  --8/21 Rituxan #4  --8/25 Dr. Boyd    WARD: Likely secondary to anemia.  Overall, symptoms have improved.    Urinary hesitancy: This is been ongoing for 20+ years, but worsened during 2 weeks.  Symptoms are improved.    Phone call duration: 7 minutes    Chart documentation with Dragon Voice recognition Software. Although reviewed after completion, some words and grammatical errors may remain.      Trinidad Liu PA-C  Hematology/Oncology  [facility]                      Again, thank you for allowing me to participate in the care of your patient.        Sincerely,        Trinidad Liu PA-C

## 2020-08-14 ENCOUNTER — HOSPITAL ENCOUNTER (OUTPATIENT)
Facility: CLINIC | Age: 80
Setting detail: SPECIMEN
End: 2020-08-14
Attending: INTERNAL MEDICINE
Payer: MEDICARE

## 2020-08-14 ENCOUNTER — HOSPITAL ENCOUNTER (OUTPATIENT)
Facility: CLINIC | Age: 80
Setting detail: SPECIMEN
Discharge: HOME | End: 2020-08-14
Attending: INTERNAL MEDICINE | Admitting: NURSE PRACTITIONER
Payer: MEDICARE

## 2020-08-14 ENCOUNTER — INFUSION THERAPY VISIT (OUTPATIENT)
Dept: INFUSION THERAPY | Facility: CLINIC | Age: 80
End: 2020-08-14
Attending: INTERNAL MEDICINE
Payer: MEDICARE

## 2020-08-14 ENCOUNTER — PATIENT OUTREACH (OUTPATIENT)
Dept: ONCOLOGY | Facility: CLINIC | Age: 80
End: 2020-08-14

## 2020-08-14 DIAGNOSIS — D59.19 OTHER AUTOIMMUNE HEMOLYTIC ANEMIAS: Primary | ICD-10-CM

## 2020-08-14 DIAGNOSIS — D83.9 CVID (COMMON VARIABLE IMMUNODEFICIENCY) (H): ICD-10-CM

## 2020-08-14 LAB
BASOPHILS # BLD AUTO: 0 10E9/L (ref 0–0.2)
BASOPHILS NFR BLD AUTO: 0 %
DIFFERENTIAL METHOD BLD: ABNORMAL
EOSINOPHIL # BLD AUTO: 0 10E9/L (ref 0–0.7)
EOSINOPHIL NFR BLD AUTO: 0 %
ERYTHROCYTE [DISTWIDTH] IN BLOOD BY AUTOMATED COUNT: 14.4 % (ref 10–15)
HCT VFR BLD AUTO: 29.5 % (ref 35–47)
HGB BLD-MCNC: 8.7 G/DL (ref 11.7–15.7)
IMM GRANULOCYTES # BLD: 0 10E9/L (ref 0–0.4)
IMM GRANULOCYTES NFR BLD: 0.2 %
LYMPHOCYTES # BLD AUTO: 0.4 10E9/L (ref 0.8–5.3)
LYMPHOCYTES NFR BLD AUTO: 8.6 %
MCH RBC QN AUTO: 34.3 PG (ref 26.5–33)
MCHC RBC AUTO-ENTMCNC: 29.5 G/DL (ref 31.5–36.5)
MCV RBC AUTO: 116 FL (ref 78–100)
MONOCYTES # BLD AUTO: 0.3 10E9/L (ref 0–1.3)
MONOCYTES NFR BLD AUTO: 6.3 %
NEUTROPHILS # BLD AUTO: 3.9 10E9/L (ref 1.6–8.3)
NEUTROPHILS NFR BLD AUTO: 84.9 %
NRBC # BLD AUTO: 0 10*3/UL
NRBC BLD AUTO-RTO: 0 /100
PLATELET # BLD AUTO: 171 10E9/L (ref 150–450)
RBC # BLD AUTO: 2.54 10E12/L (ref 3.8–5.2)
WBC # BLD AUTO: 4.6 10E9/L (ref 4–11)

## 2020-08-14 PROCEDURE — 25000132 ZZH RX MED GY IP 250 OP 250 PS 637: Mod: GY | Performed by: NURSE PRACTITIONER

## 2020-08-14 PROCEDURE — 96375 TX/PRO/DX INJ NEW DRUG ADDON: CPT

## 2020-08-14 PROCEDURE — 25800030 ZZH RX IP 258 OP 636: Performed by: NURSE PRACTITIONER

## 2020-08-14 PROCEDURE — 96413 CHEMO IV INFUSION 1 HR: CPT

## 2020-08-14 PROCEDURE — 25000128 H RX IP 250 OP 636: Performed by: NURSE PRACTITIONER

## 2020-08-14 PROCEDURE — 96415 CHEMO IV INFUSION ADDL HR: CPT

## 2020-08-14 PROCEDURE — 85025 COMPLETE CBC W/AUTO DIFF WBC: CPT | Performed by: NURSE PRACTITIONER

## 2020-08-14 RX ORDER — ACETAMINOPHEN 325 MG/1
650 TABLET ORAL ONCE
Status: COMPLETED | OUTPATIENT
Start: 2020-08-14 | End: 2020-08-14

## 2020-08-14 RX ADMIN — SODIUM CHLORIDE 700 MG: 9 INJECTION INTRAMUSCULAR; INTRAVENOUS; SUBCUTANEOUS at 11:18

## 2020-08-14 RX ADMIN — ACETAMINOPHEN 650 MG: 325 TABLET ORAL at 10:45

## 2020-08-14 RX ADMIN — SODIUM CHLORIDE 250 ML: 9 INJECTION, SOLUTION INTRAVENOUS at 10:43

## 2020-08-14 RX ADMIN — DIPHENHYDRAMINE HYDROCHLORIDE 50 MG: 50 INJECTION INTRAMUSCULAR; INTRAVENOUS at 10:43

## 2020-08-14 NOTE — PROGRESS NOTES
S-(situation): Tricia called in to clinic reporting she normally receives IVIG infusion every 4 weeks and she currently is not scheduled for her next one. Per Tricia she would be due next week.     B-(background): Tricia's previous IVIG infusion was 7/29/20    A-(assessment): Per Dr. Boyd's note from 07/23, Tricia is supposed to continue IVIG monthly    R-(recommendations): Writer will get Tricia scheduled for her next IVIG, 4 weeks after 7/29/20.     Anabell Hawkins, RN, BSN, COLLEEN  RN Care Coordinator  [facility]  [phone]

## 2020-08-14 NOTE — PROGRESS NOTES
Per Dr. Boyd, ok to check IgG 8/21/20 when she comes in for Rituxan.     Writer updated corky Coon RN with the plan, as she will have Tricia today for her rituxan.     Anabell Hawkins, RN, BSN, COLLEEN  RN Care Coordinator  [facility]  [phone]

## 2020-08-21 ENCOUNTER — HOSPITAL ENCOUNTER (OUTPATIENT)
Facility: CLINIC | Age: 80
Setting detail: SPECIMEN
Discharge: HOME | End: 2020-08-21
Attending: INTERNAL MEDICINE | Admitting: NURSE PRACTITIONER
Payer: MEDICARE

## 2020-08-21 ENCOUNTER — INFUSION THERAPY VISIT (OUTPATIENT)
Dept: INFUSION THERAPY | Facility: CLINIC | Age: 80
End: 2020-08-21
Attending: INTERNAL MEDICINE
Payer: MEDICARE

## 2020-08-21 VITALS
SYSTOLIC BLOOD PRESSURE: 145 MMHG | TEMPERATURE: 98.7 F | RESPIRATION RATE: 16 BRPM | DIASTOLIC BLOOD PRESSURE: 65 MMHG | OXYGEN SATURATION: 99 % | BODY MASS INDEX: 19.91 KG/M2 | WEIGHT: 146.8 LBS | HEART RATE: 80 BPM

## 2020-08-21 DIAGNOSIS — D83.9 CVID (COMMON VARIABLE IMMUNODEFICIENCY) (H): ICD-10-CM

## 2020-08-21 DIAGNOSIS — D59.19 OTHER AUTOIMMUNE HEMOLYTIC ANEMIAS: Primary | ICD-10-CM

## 2020-08-21 LAB
BASOPHILS # BLD AUTO: 0 10E9/L (ref 0–0.2)
BASOPHILS NFR BLD AUTO: 0 %
DIFFERENTIAL METHOD BLD: ABNORMAL
EOSINOPHIL # BLD AUTO: 0 10E9/L (ref 0–0.7)
EOSINOPHIL NFR BLD AUTO: 0 %
ERYTHROCYTE [DISTWIDTH] IN BLOOD BY AUTOMATED COUNT: 13 % (ref 10–15)
HCT VFR BLD AUTO: 29.2 % (ref 35–47)
HGB BLD-MCNC: 8.9 G/DL (ref 11.7–15.7)
IMM GRANULOCYTES # BLD: 0 10E9/L (ref 0–0.4)
IMM GRANULOCYTES NFR BLD: 0.5 %
LYMPHOCYTES # BLD AUTO: 0.5 10E9/L (ref 0.8–5.3)
LYMPHOCYTES NFR BLD AUTO: 6.9 %
MCH RBC QN AUTO: 31.2 PG (ref 26.5–33)
MCHC RBC AUTO-ENTMCNC: 30.5 G/DL (ref 31.5–36.5)
MCV RBC AUTO: 103 FL (ref 78–100)
MONOCYTES # BLD AUTO: 0.6 10E9/L (ref 0–1.3)
MONOCYTES NFR BLD AUTO: 7.6 %
NEUTROPHILS # BLD AUTO: 6.5 10E9/L (ref 1.6–8.3)
NEUTROPHILS NFR BLD AUTO: 85 %
NRBC # BLD AUTO: 0 10*3/UL
NRBC BLD AUTO-RTO: 0 /100
PLATELET # BLD AUTO: 154 10E9/L (ref 150–450)
RBC # BLD AUTO: 2.85 10E12/L (ref 3.8–5.2)
WBC # BLD AUTO: 7.7 10E9/L (ref 4–11)

## 2020-08-21 PROCEDURE — 96415 CHEMO IV INFUSION ADDL HR: CPT

## 2020-08-21 PROCEDURE — 96375 TX/PRO/DX INJ NEW DRUG ADDON: CPT

## 2020-08-21 PROCEDURE — 85025 COMPLETE CBC W/AUTO DIFF WBC: CPT | Performed by: NURSE PRACTITIONER

## 2020-08-21 PROCEDURE — 25000132 ZZH RX MED GY IP 250 OP 250 PS 637: Mod: GY | Performed by: NURSE PRACTITIONER

## 2020-08-21 PROCEDURE — 25800030 ZZH RX IP 258 OP 636: Performed by: NURSE PRACTITIONER

## 2020-08-21 PROCEDURE — 25000128 H RX IP 250 OP 636: Performed by: NURSE PRACTITIONER

## 2020-08-21 PROCEDURE — 82784 ASSAY IGA/IGD/IGG/IGM EACH: CPT | Performed by: INTERNAL MEDICINE

## 2020-08-21 PROCEDURE — 96413 CHEMO IV INFUSION 1 HR: CPT

## 2020-08-21 RX ORDER — ACETAMINOPHEN 325 MG/1
650 TABLET ORAL ONCE
Status: COMPLETED | OUTPATIENT
Start: 2020-08-21 | End: 2020-08-21

## 2020-08-21 RX ADMIN — DIPHENHYDRAMINE HYDROCHLORIDE 50 MG: 50 INJECTION INTRAMUSCULAR; INTRAVENOUS at 10:55

## 2020-08-21 RX ADMIN — ACETAMINOPHEN 650 MG: 325 TABLET ORAL at 10:54

## 2020-08-21 RX ADMIN — RITUXIMAB 700 MG: 10 INJECTION, SOLUTION INTRAVENOUS at 11:16

## 2020-08-21 ASSESSMENT — PAIN SCALES - GENERAL: PAINLEVEL: NO PAIN (0)

## 2020-08-21 NOTE — PROGRESS NOTES
Infusion Nursing Note:  Tricia Sosa presents today for Cycle 2, Day 22 Rituxan.    Patient seen by provider today: No   present during visit today: Not Applicable.    Note: Patient continues on Prednisone taper, reports feeling better. Minimal SOB with activity. Hgb improving.    Rituxan started at 100 ml/hr and increased by 100 ml/hr every 30 minutes for max of 400 ml/hr.    Intravenous Access:  Labs drawn without difficulty.  Peripheral IV placed.    Treatment Conditions:  Lab Results   Component Value Date    HGB 8.9 08/21/2020     Lab Results   Component Value Date    WBC 7.7 08/21/2020      Lab Results   Component Value Date    ANEU 6.5 08/21/2020     Lab Results   Component Value Date     08/21/2020      Results reviewed, labs MET treatment parameters, ok to proceed with treatment.      Post Infusion Assessment:  Patient tolerated infusion without incident.  Blood return noted pre and post infusion.  Site patent and intact, free from redness, edema or discomfort.  No evidence of extravasations.  Access discontinued per protocol.       Discharge Plan:   Patient declined prescription refills.  Copy of AVS reviewed with patient and/or family.  Patient will return 8/25 for Dr. Boyd and 8/26 for next IVIG appointment.  Patient discharged in stable condition accompanied by: self.  Departure Mode: Ambulatory with cane.     Estrellita Choi RN

## 2020-08-24 LAB — IGG SERPL-MCNC: 589 MG/DL (ref 610–1616)

## 2020-08-25 ENCOUNTER — VIRTUAL VISIT (OUTPATIENT)
Dept: ONCOLOGY | Facility: CLINIC | Age: 80
End: 2020-08-25
Attending: INTERNAL MEDICINE
Payer: MEDICARE

## 2020-08-25 DIAGNOSIS — D59.19 OTHER AUTOIMMUNE HEMOLYTIC ANEMIAS: ICD-10-CM

## 2020-08-25 PROCEDURE — 40001009 ZZH VIDEO/TELEPHONE VISIT; NO CHARGE

## 2020-08-25 PROCEDURE — 99441 ZZC PHYSICIAN TELEPHONE EVALUATION 5-10 MIN: CPT | Performed by: INTERNAL MEDICINE

## 2020-08-25 RX ORDER — PREDNISONE 20 MG/1
60 TABLET ORAL DAILY
Qty: 90 TABLET | Refills: 0 | Status: CANCELLED | OUTPATIENT
Start: 2020-08-25

## 2020-08-25 NOTE — LETTER
"    8/25/2020         RE: Tricia Sosa  [address]  [place] [address]        Dear Colleague,    Thank you for referring your patient, Tricia Sosa, to the [facility] CANCER [facility]INIC. Please see a copy of my visit note below.    Tricia Sosa is a 79 year old female who is being evaluated via a billable telephone visit.      The patient has been notified of following:     \"This telephone visit will be conducted via a call between you and your physician/provider. We have found that certain health care needs can be provided without the need for a physical exam.  This service lets us provide the care you need with a short phone conversation.  If a prescription is necessary we can send it directly to your pharmacy.  If lab work is needed we can place an order for that and you can then stop by our lab to have the test done at a later time.    Telephone visits are billed at different rates depending on your insurance coverage. During this emergency period, for some insurers they may be billed the same as an in-person visit.  Please reach out to your insurance provider with any questions.    If during the course of the call the physician/provider feels a telephone visit is not appropriate, you will not be charged for this service.\"    Patient has given verbal consent for Telephone visit?  Yes    What phone number would you like to be contacted at? [phone]    How would you like to obtain your AVS? MyChart     - trouble sleeping, normally only gets 4 hours of sleep per night  - discuss prednisone dose    Barbara Corrigan [facility]    Hematology/Oncology Established Patient Follow-up Note      Today's Date: 08/25/20    Reason for Follow-up: Hemolytic anemia-autoimmune; IgA deficiency    HISTORY OF PRESENT ILLNESS: Tricia Sosa is a 79 year old female who presents with autoimmune hemolytic anemia and IgA deficiency.  She previously saw Dr. Matos and then Dr. Summers.  She was " previously seen at [facility].    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  She was very sick at the time and had severe diarrhea.  She lost about 40 pounds in weight.  The workup revealed that she had markedly diminished CD4 counts of 48 and was diagnosed with CMV colitis.  Since then she has been followed in hematology clinic at [place] until recently.  She was noted to have anemia which was fairly long-standing.  She was initially referred for anemia in 2004 and also had a bone marrow biopsy done at that time which revealed hypercellular bone marrow with 70-80% cellularity and normal trilineage hematopoiesis and no morphologic features of MDS or lymphoproliferation.  Her anemia was attributed to her chronic underlying disease.  At the next evaluation in 2002 on 4 aggressive anemia there was no evidence of hemolysis, iron deficiency, B12 or folate deficiency.  Bone marrow biopsy was not pursued.  In summer of 2015 she had progressive fatigue, dyspnea on exertion and worsening anemia with a hemoglobin now of 9.  Workup at this time did suggest a hemolytic anemia with elevated LDH at 709, undetectable haptoglobin and elevated unconjugated bilirubin at 3.3.  Monospecific MARIKA was positive for both IgG and complement.  Cold agglutinin screen was positive with very low titer 1:64.  She was started on prednisone 60 mg daily with supplementation of iron folate and B12 starting 7/31/15.  Her prednisone has been slowly tapered and was discontinued off in January 2016.  She was last followed at [facility] on March 10, 2016 when she had stable hemoglobin.    She was followed by Dr. Matos and Dr. Summers.  Due to relapse of hemolytic anemia, she underwent another course of prednisone that has since been tapered off and rituximab x 4 weekly doses completed in 9/19/19.    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  She started  weekly Rituxan on 7/31/20 x 4 doses, completed on 8/21/20.        INTERIM HISTORY: Tricia says that she feels well.  She has been feeling much better.  The prednisone is preventing her from sleeping well at night though.      REVIEW OF SYSTEMS:   14 point ROS was reviewed and is negative other than as noted above in HPI.       HOME MEDICATIONS:  Current Outpatient Medications   Medication Sig Dispense Refill     cyanocobalamin (VITAMIN  B-12) 1000 MCG tablet   3     folic acid (FOLVITE) 1 MG tablet   3     ketoconazole (NIZORAL) 2 % external shampoo Use every other day to scalp as needed 120 mL 11     levothyroxine (SYNTHROID/LEVOTHROID) 150 MCG tablet Take 1 tablet (150 mcg) by mouth daily 90 tablet 3     predniSONE (DELTASONE) 10 MG tablet Take 1 tablet (10 mg) by mouth daily 80 tablet 0     predniSONE (DELTASONE) 20 MG tablet Take 3 tablets (60 mg) by mouth daily (Patient taking differently: Take 40 mg by mouth daily ) 90 tablet 0     sulfamethoxazole-trimethoprim (BACTRIM DS/SEPTRA DS) 800-160 MG tablet Take 1 pill orally on Mon, Wed and Friday 45 tablet 3     UNABLE TO FIND privigen inj monthly       hydrochlorothiazide (HYDRODIURIL) 12.5 MG tablet Take 1 tablet (12.5 mg) by mouth daily (Patient not taking: Reported on 8/21/2020) 30 tablet 1     hydrocortisone butyrate (LOCOID) 0.1 % SOLN solution Apply to scalp bid prn (Patient not taking: Reported on 8/25/2020) 60 mL 11     potassium chloride ER (KLOR-CON M) 20 MEQ CR tablet Take 1 tablet (20 mEq) by mouth daily (Patient not taking: Reported on 8/14/2020) 90 tablet 3     triamcinolone (KENALOG) 0.1 % external cream            ALLERGIES:  Allergies   Allergen Reactions     Doxycycline          PAST MEDICAL HISTORY:  Past Medical History:   Diagnosis Date     WARD (dyspnea on exertion)      External hemorrhoids without mention of complication 6/27/05     Hemolytic anemia (H)     autoimmune     Hypothyroidism      IgA deficiency (H)      Other malaise and fatigue       Other severe protein-calorie malnutrition      Thyroid disease      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, and posterior vitreous detachment     Urinary tract infection, site not specified     Recurrent UTI's         PAST SURGICAL HISTORY:  Past Surgical History:   Procedure Laterality Date     C LIGATE FALLOPIAN TUBE       COLONOSCOPY N/A 4/26/2016    Procedure: COLONOSCOPY;  Surgeon: Dontae Del Rio MD;  Location: [facility]     HC COLONOSCOPY W BIOPSY  4/26/00     HC COLONOSCOPY W BIOPSY  10/8/03     HC COLONOSCOPY W BIOPSY  6/27/05    REPEAT IN 5 YEARS     HC CYSTOSCOPY,INSERT URETERAL STENT      Ureteral stent insertion     HC FLEX SIGMOIDOSCOPY W BIOPSY  5/30/00     HC LAPAROSCOPY, SURGICAL; CHOLECYSTECTOMY  1992      THYROIDECTOMY       LIGATION OF HEMORRHOID(S)      Hemorrhoidectomy     UPPER GI ENDOSCOPY,BIOPSY  10/01/01         SOCIAL HISTORY:  Social History     Socioeconomic History     Marital status:      Spouse name: Not on file     Number of children: Not on file     Years of education: Not on file     Highest education level: Not on file   Occupational History     Not on file   Social Needs     Financial resource strain: Not on file     Food insecurity     Worry: Not on file     Inability: Not on file     Transportation needs     Medical: Not on file     Non-medical: Not on file   Tobacco Use     Smoking status: Never Smoker     Smokeless tobacco: Never Used   Substance and Sexual Activity     Alcohol use: No     Alcohol/week: 0.0 standard drinks     Frequency: Never     Drug use: No     Sexual activity: Never   Lifestyle     Physical activity     Days per week: Not on file     Minutes per session: Not on file     Stress: Not on file   Relationships     Social connections     Talks on phone: Not on file     Gets together: Not on file     Attends Oriental orthodox service: Not on file     Active member of club or organization: Not on file     Attends meetings of  clubs or organizations: Not on file     Relationship status: Not on file     Intimate partner violence     Fear of current or ex partner: Not on file     Emotionally abused: Not on file     Physically abused: Not on file     Forced sexual activity: Not on file   Other Topics Concern     Parent/sibling w/ CABG, MI or angioplasty before 65F 55M? Not Asked   Social History Narrative     Not on file         FAMILY HISTORY:  Family History   Problem Relation Age of Onset     Colon Cancer Mother 90     Cerebrovascular Disease Father          at age 85     Dementia Brother      Dementia Brother      Pancreatic Cancer Brother      Breast Cancer Sister          PHYSICAL EXAM:  Vital signs:  LMP  (LMP Unknown)    Phone visit.      LABS:  CBC RESULTS:   Recent Labs   Lab Test 20  1015   WBC 7.7   RBC 2.85*   HGB 8.9*   HCT 29.2*   *   MCH 31.2   MCHC 30.5*   RDW 13.0        Component      Latest Ref Rng & Units 2020   IGG      610 - 1,616 mg/dL 589 (L)       ASSESSMENT/PLAN:  Tricia Sosa is a 79 year old female with:      1. 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 Common variable immunodeficiency disorder  4. Splenomegaly        1) Autoimmune hemolytic anemia: She recently had relapse in 2020, and started on prednisone, as well as weekly rituximab infusions x 4, completed on 20.  Her hemoglobin is slowly improving, up to 8.9.      We also talked about other treatment options if her disease becomes refractory to steroids, such as splenectomy.      -continue prednisone taper.  She is currently on 40 mg daily.  If her CBC remains stable/improved tomorrow, will continue taper down to 30 mg daily, and decrease by 10 mg every week.    -IVIG tomorrow with labs (CBC/diff)  -weekly labs for now to make sure hemolysis is stable  -follow-up with me in 4-6 weeks (CBC/diff, CMP, reticulocyte count,  LDH, haptoglobin, serum immunoglobulins)    2) CVID:   -continue IVIG, as above.  She gets it monthly, if IgG is <600          Sabina Boyd MD  Hematology/Oncology  [facility]          Phone call duration: 9 minutes            Again, thank you for allowing me to participate in the care of your patient.        Sincerely,        Sabina Boyd MD

## 2020-08-25 NOTE — LETTER
"    8/25/2020         RE: Tricia Sosa  [address]  [facility] [id number]        Dear Colleague,    Thank you for referring your patient, Tricia Sosa, to the [facility]. Please see a copy of my visit note below.    Tricia Sosa is a 79 year old female who is being evaluated via a billable telephone visit.      The patient has been notified of following:     \"This telephone visit will be conducted via a call between you and your physician/provider. We have found that certain health care needs can be provided without the need for a physical exam.  This service lets us provide the care you need with a short phone conversation.  If a prescription is necessary we can send it directly to your pharmacy.  If lab work is needed we can place an order for that and you can then stop by our lab to have the test done at a later time.    Telephone visits are billed at different rates depending on your insurance coverage. During this emergency period, for some insurers they may be billed the same as an in-person visit.  Please reach out to your insurance provider with any questions.    If during the course of the call the physician/provider feels a telephone visit is not appropriate, you will not be charged for this service.\"    Patient has given verbal consent for Telephone visit?  Yes    What phone number would you like to be contacted at? [phone]    How would you like to obtain your AVS? MyChart     - trouble sleeping, normally only gets 4 hours of sleep per night  - discuss prednisone dose    Barbara Corrigan [facility]    Hematology/Oncology Established Patient Follow-up Note      Today's Date: 08/25/20    Reason for Follow-up: Hemolytic anemia-autoimmune; IgA deficiency    HISTORY OF PRESENT ILLNESS: Tricia Sosa is a 79 year old female who presents with autoimmune hemolytic anemia and IgA deficiency.  She previously saw Dr. Matos and then Dr. Summers.  She was " previously seen at [facility].    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  She was very sick at the time and had severe diarrhea.  She lost about 40 pounds in weight.  The workup revealed that she had markedly diminished CD4 counts of 48 and was diagnosed with CMV colitis.  Since then she has been followed in hematology clinic at [place] until recently.  She was noted to have anemia which was fairly long-standing.  She was initially referred for anemia in 2004 and also had a bone marrow biopsy done at that time which revealed hypercellular bone marrow with 70-80% cellularity and normal trilineage hematopoiesis and no morphologic features of MDS or lymphoproliferation.  Her anemia was attributed to her chronic underlying disease.  At the next evaluation in 2002 on 4 aggressive anemia there was no evidence of hemolysis, iron deficiency, B12 or folate deficiency.  Bone marrow biopsy was not pursued.  In summer of 2015 she had progressive fatigue, dyspnea on exertion and worsening anemia with a hemoglobin now of 9.  Workup at this time did suggest a hemolytic anemia with elevated LDH at 709, undetectable haptoglobin and elevated unconjugated bilirubin at 3.3.  Monospecific MARIKA was positive for both IgG and complement.  Cold agglutinin screen was positive with very low titer 1:64.  She was started on prednisone 60 mg daily with supplementation of iron folate and B12 starting 7/31/15.  Her prednisone has been slowly tapered and was discontinued off in January 2016.  She was last followed at [facility] on March 10, 2016 when she had stable hemoglobin.    She was followed by Dr. Matos and Dr. Summers.  Due to relapse of hemolytic anemia, she underwent another course of prednisone that has since been tapered off and rituximab x 4 weekly doses completed in 9/19/19.    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  She started  weekly Rituxan on 7/31/20 x 4 doses, completed on 8/21/20.        INTERIM HISTORY: Tricia says that she feels well.  She has been feeling much better.  The prednisone is preventing her from sleeping well at night though.      REVIEW OF SYSTEMS:   14 point ROS was reviewed and is negative other than as noted above in HPI.       HOME MEDICATIONS:  Current Outpatient Medications   Medication Sig Dispense Refill     cyanocobalamin (VITAMIN  B-12) 1000 MCG tablet   3     folic acid (FOLVITE) 1 MG tablet   3     ketoconazole (NIZORAL) 2 % external shampoo Use every other day to scalp as needed 120 mL 11     levothyroxine (SYNTHROID/LEVOTHROID) 150 MCG tablet Take 1 tablet (150 mcg) by mouth daily 90 tablet 3     predniSONE (DELTASONE) 10 MG tablet Take 1 tablet (10 mg) by mouth daily 80 tablet 0     predniSONE (DELTASONE) 20 MG tablet Take 3 tablets (60 mg) by mouth daily (Patient taking differently: Take 40 mg by mouth daily ) 90 tablet 0     sulfamethoxazole-trimethoprim (BACTRIM DS/SEPTRA DS) 800-160 MG tablet Take 1 pill orally on Mon, Wed and Friday 45 tablet 3     UNABLE TO FIND privigen inj monthly       hydrochlorothiazide (HYDRODIURIL) 12.5 MG tablet Take 1 tablet (12.5 mg) by mouth daily (Patient not taking: Reported on 8/21/2020) 30 tablet 1     hydrocortisone butyrate (LOCOID) 0.1 % SOLN solution Apply to scalp bid prn (Patient not taking: Reported on 8/25/2020) 60 mL 11     potassium chloride ER (KLOR-CON M) 20 MEQ CR tablet Take 1 tablet (20 mEq) by mouth daily (Patient not taking: Reported on 8/14/2020) 90 tablet 3     triamcinolone (KENALOG) 0.1 % external cream            ALLERGIES:  Allergies   Allergen Reactions     Doxycycline          PAST MEDICAL HISTORY:  Past Medical History:   Diagnosis Date     WARD (dyspnea on exertion)      External hemorrhoids without mention of complication 6/27/05     Hemolytic anemia (H)     autoimmune     Hypothyroidism      IgA deficiency (H)      Other malaise and fatigue       Other severe protein-calorie malnutrition      Thyroid disease      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, and posterior vitreous detachment     Urinary tract infection, site not specified     Recurrent UTI's         PAST SURGICAL HISTORY:  Past Surgical History:   Procedure Laterality Date     C LIGATE FALLOPIAN TUBE       COLONOSCOPY N/A 4/26/2016    Procedure: COLONOSCOPY;  Surgeon: Dontae Del Rio MD;  Location: [facility]     HC COLONOSCOPY W BIOPSY  4/26/00     HC COLONOSCOPY W BIOPSY  10/8/03     HC COLONOSCOPY W BIOPSY  6/27/05    REPEAT IN 5 YEARS     HC CYSTOSCOPY,INSERT URETERAL STENT      Ureteral stent insertion     HC FLEX SIGMOIDOSCOPY W BIOPSY  5/30/00     HC LAPAROSCOPY, SURGICAL; CHOLECYSTECTOMY  1992      THYROIDECTOMY       LIGATION OF HEMORRHOID(S)      Hemorrhoidectomy     UPPER GI ENDOSCOPY,BIOPSY  10/01/01         SOCIAL HISTORY:  Social History     Socioeconomic History     Marital status:      Spouse name: Not on file     Number of children: Not on file     Years of education: Not on file     Highest education level: Not on file   Occupational History     Not on file   Social Needs     Financial resource strain: Not on file     Food insecurity     Worry: Not on file     Inability: Not on file     Transportation needs     Medical: Not on file     Non-medical: Not on file   Tobacco Use     Smoking status: Never Smoker     Smokeless tobacco: Never Used   Substance and Sexual Activity     Alcohol use: No     Alcohol/week: 0.0 standard drinks     Frequency: Never     Drug use: No     Sexual activity: Never   Lifestyle     Physical activity     Days per week: Not on file     Minutes per session: Not on file     Stress: Not on file   Relationships     Social connections     Talks on phone: Not on file     Gets together: Not on file     Attends Jew service: Not on file     Active member of club or organization: Not on file     Attends meetings of  clubs or organizations: Not on file     Relationship status: Not on file     Intimate partner violence     Fear of current or ex partner: Not on file     Emotionally abused: Not on file     Physically abused: Not on file     Forced sexual activity: Not on file   Other Topics Concern     Parent/sibling w/ CABG, MI or angioplasty before 65F 55M? Not Asked   Social History Narrative     Not on file         FAMILY HISTORY:  Family History   Problem Relation Age of Onset     Colon Cancer Mother 90     Cerebrovascular Disease Father          at age 85     Dementia Brother      Dementia Brother      Pancreatic Cancer Brother      Breast Cancer Sister          PHYSICAL EXAM:  Vital signs:  LMP  (LMP Unknown)    Phone visit.      LABS:  CBC RESULTS:   Recent Labs   Lab Test 20  1015   WBC 7.7   RBC 2.85*   HGB 8.9*   HCT 29.2*   *   MCH 31.2   MCHC 30.5*   RDW 13.0        Component      Latest Ref Rng & Units 2020   IGG      610 - 1,616 mg/dL 589 (L)       ASSESSMENT/PLAN:  Tricia Sosa is a 79 year old female with:      1. 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 Common variable immunodeficiency disorder  4. Splenomegaly        1) Autoimmune hemolytic anemia: She recently had relapse in 2020, and started on prednisone, as well as weekly rituximab infusions x 4, completed on 20.  Her hemoglobin is slowly improving, up to 8.9.      We also talked about other treatment options if her disease becomes refractory to steroids, such as splenectomy.      -continue prednisone taper.  She is currently on 40 mg daily.  If her CBC remains stable/improved tomorrow, will continue taper down to 30 mg daily, and decrease by 10 mg every week.    -IVIG tomorrow with labs (CBC/diff)  -weekly labs for now to make sure hemolysis is stable  -follow-up with me in 4-6 weeks (CBC/diff, CMP, reticulocyte count,  LDH, haptoglobin, serum immunoglobulins)    2) CVID:   -continue IVIG, as above.  She gets it monthly, if IgG is <600          Sabina Boyd MD  Hematology/Oncology  [facility]          Phone call duration: 9 minutes            Again, thank you for allowing me to participate in the care of your patient.        Sincerely,        Sabina Boyd MD

## 2020-08-25 NOTE — PROGRESS NOTES
"Tricia Sosa is a 79 year old female who is being evaluated via a billable telephone visit.      The patient has been notified of following:     \"This telephone visit will be conducted via a call between you and your physician/provider. We have found that certain health care needs can be provided without the need for a physical exam.  This service lets us provide the care you need with a short phone conversation.  If a prescription is necessary we can send it directly to your pharmacy.  If lab work is needed we can place an order for that and you can then stop by our lab to have the test done at a later time.    Telephone visits are billed at different rates depending on your insurance coverage. During this emergency period, for some insurers they may be billed the same as an in-person visit.  Please reach out to your insurance provider with any questions.    If during the course of the call the physician/provider feels a telephone visit is not appropriate, you will not be charged for this service.\"    Patient has given verbal consent for Telephone visit?  Yes    What phone number would you like to be contacted at? [phone]    How would you like to obtain your AVS? MyChart     - trouble sleeping, normally only gets 4 hours of sleep per night  - discuss prednisone dose    Barbara Corrigan [facility]    Hematology/Oncology Established Patient Follow-up Note      Today's Date: 08/25/20    Reason for Follow-up: Hemolytic anemia-autoimmune; IgA deficiency    HISTORY OF PRESENT ILLNESS: Tricia Sosa is a 79 year old female who presents with autoimmune hemolytic anemia and IgA deficiency.  She previously saw Dr. Matos and then Dr. Summers.  She was previously seen at [facility].    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  She was very sick at the time and had severe diarrhea.  She lost about 40 pounds in weight.  The workup revealed that she " had markedly diminished CD4 counts of 48 and was diagnosed with CMV colitis.  Since then she has been followed in hematology clinic at [place] until recently.  She was noted to have anemia which was fairly long-standing.  She was initially referred for anemia in 2004 and also had a bone marrow biopsy done at that time which revealed hypercellular bone marrow with 70-80% cellularity and normal trilineage hematopoiesis and no morphologic features of MDS or lymphoproliferation.  Her anemia was attributed to her chronic underlying disease.  At the next evaluation in 2002 on 4 aggressive anemia there was no evidence of hemolysis, iron deficiency, B12 or folate deficiency.  Bone marrow biopsy was not pursued.  In summer of 2015 she had progressive fatigue, dyspnea on exertion and worsening anemia with a hemoglobin now of 9.  Workup at this time did suggest a hemolytic anemia with elevated LDH at 709, undetectable haptoglobin and elevated unconjugated bilirubin at 3.3.  Monospecific MARIKA was positive for both IgG and complement.  Cold agglutinin screen was positive with very low titer 1:64.  She was started on prednisone 60 mg daily with supplementation of iron folate and B12 starting 7/31/15.  Her prednisone has been slowly tapered and was discontinued off in January 2016.  She was last followed at [facility] on March 10, 2016 when she had stable hemoglobin.    She was followed by Dr. Matos and Dr. Summers.  Due to relapse of hemolytic anemia, she underwent another course of prednisone that has since been tapered off and rituximab x 4 weekly doses completed in 9/19/19.    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  She started weekly Rituxan on 7/31/20 x 4 doses, completed on 8/21/20.        INTERIM HISTORY: Tricia says that she feels well.  She has been feeling much better.  The prednisone is preventing her from sleeping well at night though.      REVIEW OF SYSTEMS:    14 point ROS was reviewed and is negative other than as noted above in HPI.       HOME MEDICATIONS:  Current Outpatient Medications   Medication Sig Dispense Refill     cyanocobalamin (VITAMIN  B-12) 1000 MCG tablet   3     folic acid (FOLVITE) 1 MG tablet   3     ketoconazole (NIZORAL) 2 % external shampoo Use every other day to scalp as needed 120 mL 11     levothyroxine (SYNTHROID/LEVOTHROID) 150 MCG tablet Take 1 tablet (150 mcg) by mouth daily 90 tablet 3     predniSONE (DELTASONE) 10 MG tablet Take 1 tablet (10 mg) by mouth daily 80 tablet 0     predniSONE (DELTASONE) 20 MG tablet Take 3 tablets (60 mg) by mouth daily (Patient taking differently: Take 40 mg by mouth daily ) 90 tablet 0     sulfamethoxazole-trimethoprim (BACTRIM DS/SEPTRA DS) 800-160 MG tablet Take 1 pill orally on Mon, Wed and Friday 45 tablet 3     UNABLE TO FIND privigen inj monthly       hydrochlorothiazide (HYDRODIURIL) 12.5 MG tablet Take 1 tablet (12.5 mg) by mouth daily (Patient not taking: Reported on 8/21/2020) 30 tablet 1     hydrocortisone butyrate (LOCOID) 0.1 % SOLN solution Apply to scalp bid prn (Patient not taking: Reported on 8/25/2020) 60 mL 11     potassium chloride ER (KLOR-CON M) 20 MEQ CR tablet Take 1 tablet (20 mEq) by mouth daily (Patient not taking: Reported on 8/14/2020) 90 tablet 3     triamcinolone (KENALOG) 0.1 % external cream            ALLERGIES:  Allergies   Allergen Reactions     Doxycycline          PAST MEDICAL HISTORY:  Past Medical History:   Diagnosis Date     WARD (dyspnea on exertion)      External hemorrhoids without mention of complication 6/27/05     Hemolytic anemia (H)     autoimmune     Hypothyroidism      IgA deficiency (H)      Other malaise and fatigue      Other severe protein-calorie malnutrition      Thyroid disease      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, and posterior vitreous detachment     Urinary tract infection, site not specified     Recurrent  UTI's         PAST SURGICAL HISTORY:  Past Surgical History:   Procedure Laterality Date     C LIGATE FALLOPIAN TUBE       COLONOSCOPY N/A 4/26/2016    Procedure: COLONOSCOPY;  Surgeon: Dontae Del Rio MD;  Location: [facility]     HC COLONOSCOPY W BIOPSY  4/26/00     HC COLONOSCOPY W BIOPSY  10/8/03     HC COLONOSCOPY W BIOPSY  6/27/05    REPEAT IN 5 YEARS     HC CYSTOSCOPY,INSERT URETERAL STENT      Ureteral stent insertion     HC FLEX SIGMOIDOSCOPY W BIOPSY  5/30/00      LAPAROSCOPY, SURGICAL; CHOLECYSTECTOMY  1992      THYROIDECTOMY       LIGATION OF HEMORRHOID(S)      Hemorrhoidectomy     UPPER GI ENDOSCOPY,BIOPSY  10/01/01         SOCIAL HISTORY:  Social History     Socioeconomic History     Marital status:      Spouse name: Not on file     Number of children: Not on file     Years of education: Not on file     Highest education level: Not on file   Occupational History     Not on file   Social Needs     Financial resource strain: Not on file     Food insecurity     Worry: Not on file     Inability: Not on file     Transportation needs     Medical: Not on file     Non-medical: Not on file   Tobacco Use     Smoking status: Never Smoker     Smokeless tobacco: Never Used   Substance and Sexual Activity     Alcohol use: No     Alcohol/week: 0.0 standard drinks     Frequency: Never     Drug use: No     Sexual activity: Never   Lifestyle     Physical activity     Days per week: Not on file     Minutes per session: Not on file     Stress: Not on file   Relationships     Social connections     Talks on phone: Not on file     Gets together: Not on file     Attends Islam service: Not on file     Active member of club or organization: Not on file     Attends meetings of clubs or organizations: Not on file     Relationship status: Not on file     Intimate partner violence     Fear of current or ex partner: Not on file     Emotionally abused: Not on file     Physically abused: Not on file     Forced sexual  activity: Not on file   Other Topics Concern     Parent/sibling w/ CABG, MI or angioplasty before 65F 55M? Not Asked   Social History Narrative     Not on file         FAMILY HISTORY:  Family History   Problem Relation Age of Onset     Colon Cancer Mother 90     Cerebrovascular Disease Father          at age 85     Dementia Brother      Dementia Brother      Pancreatic Cancer Brother      Breast Cancer Sister          PHYSICAL EXAM:  Vital signs:  LMP  (LMP Unknown)    Phone visit.      LABS:  CBC RESULTS:   Recent Labs   Lab Test 20  1015   WBC 7.7   RBC 2.85*   HGB 8.9*   HCT 29.2*   *   MCH 31.2   MCHC 30.5*   RDW 13.0        Component      Latest Ref Rng & Units 2020   IGG      610 - 1,616 mg/dL 589 (L)       ASSESSMENT/PLAN:  Tricia Sosa is a 79 year old female with:      1. 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 Common variable immunodeficiency disorder  4. Splenomegaly        1) Autoimmune hemolytic anemia: She recently had relapse in 2020, and started on prednisone, as well as weekly rituximab infusions x 4, completed on 20.  Her hemoglobin is slowly improving, up to 8.9.      We also talked about other treatment options if her disease becomes refractory to steroids, such as splenectomy.      -continue prednisone taper.  She is currently on 40 mg daily.  If her CBC remains stable/improved tomorrow, will continue taper down to 30 mg daily, and decrease by 10 mg every week.    -IVIG tomorrow with labs (CBC/diff)  -weekly labs for now to make sure hemolysis is stable  -follow-up with me in 4-6 weeks (CBC/diff, CMP, reticulocyte count, LDH, haptoglobin, serum immunoglobulins)    2) CVID:   -continue IVIG, as above.  She gets it monthly, if IgG is <600          Sabina Boyd MD  Hematology/Oncology  [facility]          Phone call duration: 9  minutes

## 2020-08-26 ENCOUNTER — HOSPITAL ENCOUNTER (OUTPATIENT)
Facility: CLINIC | Age: 80
Setting detail: SPECIMEN
Discharge: HOME | End: 2020-08-26
Attending: INTERNAL MEDICINE | Admitting: INTERNAL MEDICINE
Payer: MEDICARE

## 2020-08-26 ENCOUNTER — INFUSION THERAPY VISIT (OUTPATIENT)
Dept: INFUSION THERAPY | Facility: CLINIC | Age: 80
End: 2020-08-26
Attending: INTERNAL MEDICINE
Payer: MEDICARE

## 2020-08-26 VITALS
BODY MASS INDEX: 20.15 KG/M2 | DIASTOLIC BLOOD PRESSURE: 63 MMHG | HEART RATE: 69 BPM | RESPIRATION RATE: 16 BRPM | TEMPERATURE: 97.4 F | WEIGHT: 148.6 LBS | OXYGEN SATURATION: 97 % | SYSTOLIC BLOOD PRESSURE: 132 MMHG

## 2020-08-26 DIAGNOSIS — D59.19 OTHER AUTOIMMUNE HEMOLYTIC ANEMIAS: Primary | ICD-10-CM

## 2020-08-26 DIAGNOSIS — D83.9 CVID (COMMON VARIABLE IMMUNODEFICIENCY) (H): ICD-10-CM

## 2020-08-26 LAB
BASOPHILS # BLD AUTO: 0 10E9/L (ref 0–0.2)
BASOPHILS NFR BLD AUTO: 0.2 %
DIFFERENTIAL METHOD BLD: ABNORMAL
EOSINOPHIL # BLD AUTO: 0 10E9/L (ref 0–0.7)
EOSINOPHIL NFR BLD AUTO: 0.2 %
ERYTHROCYTE [DISTWIDTH] IN BLOOD BY AUTOMATED COUNT: 12.8 % (ref 10–15)
HCT VFR BLD AUTO: 28.6 % (ref 35–47)
HGB BLD-MCNC: 8.9 G/DL (ref 11.7–15.7)
IMM GRANULOCYTES # BLD: 0 10E9/L (ref 0–0.4)
IMM GRANULOCYTES NFR BLD: 0.5 %
LYMPHOCYTES # BLD AUTO: 0.3 10E9/L (ref 0.8–5.3)
LYMPHOCYTES NFR BLD AUTO: 5.1 %
MCH RBC QN AUTO: 30.4 PG (ref 26.5–33)
MCHC RBC AUTO-ENTMCNC: 31.1 G/DL (ref 31.5–36.5)
MCV RBC AUTO: 98 FL (ref 78–100)
MONOCYTES # BLD AUTO: 0.3 10E9/L (ref 0–1.3)
MONOCYTES NFR BLD AUTO: 4.8 %
NEUTROPHILS # BLD AUTO: 6 10E9/L (ref 1.6–8.3)
NEUTROPHILS NFR BLD AUTO: 89.2 %
NRBC # BLD AUTO: 0 10*3/UL
NRBC BLD AUTO-RTO: 0 /100
PLATELET # BLD AUTO: 143 10E9/L (ref 150–450)
RBC # BLD AUTO: 2.93 10E12/L (ref 3.8–5.2)
WBC # BLD AUTO: 6.7 10E9/L (ref 4–11)

## 2020-08-26 PROCEDURE — 96375 TX/PRO/DX INJ NEW DRUG ADDON: CPT

## 2020-08-26 PROCEDURE — 96366 THER/PROPH/DIAG IV INF ADDON: CPT

## 2020-08-26 PROCEDURE — 99207 ZZC NO CHARGE NURSE ONLY: CPT

## 2020-08-26 PROCEDURE — 96365 THER/PROPH/DIAG IV INF INIT: CPT

## 2020-08-26 PROCEDURE — 25000128 H RX IP 250 OP 636: Performed by: INTERNAL MEDICINE

## 2020-08-26 PROCEDURE — 85025 COMPLETE CBC W/AUTO DIFF WBC: CPT | Performed by: INTERNAL MEDICINE

## 2020-08-26 RX ORDER — ACETAMINOPHEN 325 MG/1
650 TABLET ORAL ONCE
Status: CANCELLED
Start: 2020-08-26

## 2020-08-26 RX ORDER — HEPARIN SODIUM,PORCINE 10 UNIT/ML
5 VIAL (ML) INTRAVENOUS
Status: CANCELLED | OUTPATIENT
Start: 2020-08-26

## 2020-08-26 RX ORDER — DIPHENHYDRAMINE HCL 25 MG
50 CAPSULE ORAL ONCE
Status: CANCELLED | OUTPATIENT
Start: 2020-08-26

## 2020-08-26 RX ORDER — HEPARIN SODIUM (PORCINE) LOCK FLUSH IV SOLN 100 UNIT/ML 100 UNIT/ML
5 SOLUTION INTRAVENOUS
Status: CANCELLED | OUTPATIENT
Start: 2020-08-26

## 2020-08-26 RX ADMIN — HYDROCORTISONE SODIUM SUCCINATE 100 MG: 100 INJECTION, POWDER, FOR SOLUTION INTRAMUSCULAR; INTRAVENOUS at 10:03

## 2020-08-26 RX ADMIN — HUMAN IMMUNOGLOBULIN G 35 G: 20 LIQUID INTRAVENOUS at 10:40

## 2020-08-26 NOTE — PROGRESS NOTES
Infusion Nursing Note:  Tricia Sosa presents today for IVIG.  **Per office note from Dr. Boyd on 8/25/20: Q month IVIG if IgG < 600. IgG = 589 on 8/21/20**.  Patient seen by provider today: No. Appointment with Dr. Boyd on 8/25/20.   present during visit today: Not Applicable.    Note:  Patient reports is feeling well today.  Patient denies fevers, chills or signs of infection.  Right pointer finger:  Patient experienced very small burn from her oven yesterday which is superficial.  No redness, warmth, swelling, drainage or fever present.  Writer recommended to apply Bacitracin to area and to contact doctor if does not continue to heal.  Patient verbalized understanding of plan. Bruising:  Patient does have a few areas of bruising on both arms.    Per discussion with Anabell Hawkins,RNCC, for Dr. Boyd:  Do not need to draw Q month labs today, only draw Cbc/diff today.  Patient will have Cbc/diff, reticulocyte count, LDH, haptoglobin and immunoglobulins drawn prior to next visit with Dr. Boyd.    Intravenous Access:  Labs drawn without difficulty.  Peripheral IV placed.  PIV site C/D/I.  PIV flushes well + excellent blood return.    Treatment Conditions:  Biological Infusion Checklist:  ~~~ NOTE: If the patient answers yes to any of the questions below, hold the infusion and contact ordering provider or on-call provider.    1. Have you recently had an elevated temperature, fever, chills, productive cough, coughing for 3 weeks or longer or hemoptysis, abnormal vital signs, night sweats,  chest pain or have you noticed a decrease in your appetite, unexplained weight loss or fatigue? No  2. Do you have any open wounds or new incisions? No  3. Do you have any recent or upcoming hospitalizations, surgeries or dental procedures? No  4. Do you currently have or recently have had any signs of illness or infection or are you on any antibiotics? No  5. Have you had any new, sudden or worsening abdominal pain?  No  6. Have you or anyone in your household received a live vaccination in the past 4 weeks? Please note:  No live vaccines while on biologic/chemotherapy until 6 months after the last treatment.  Patient can receive the flu vaccine (shot only) and the pneumovax.  It is optimal for the patient to get these vaccines mid cycle, but they can be given at any time as long as it is not on the day of the infusion. No  7. Have you recently been diagnosed with any new nervous system diseases (ie. Multiple sclerosis, Guillain Butler, seizures, neurological changes) or cancer diagnosis? No  8. Are you on any form of radiation or chemotherapy? No  9. Are you pregnant or breast feeding or do you have plans of pregnancy in the future? N/A  10. Have you been having any signs of worsening depression or suicidal ideations?  (benlysta only) No  11. Have there been any other new onset medical symptoms? No    Post Infusion Assessment:  Patient tolerated infusion without incident.  Blood return noted pre and post infusion.  Site patent and intact, free from redness, edema or discomfort.  No evidence of extravasations.  Access discontinued per protocol.       Discharge Plan:   Discharge instructions reviewed with: Patient.  Patient and/or family verbalized understanding of discharge instructions and all questions answered.  Patient discharged in stable condition accompanied by: self.  Departure Mode: Ambulatory.  9/2/20 + 9/9/20:  Weekly Cbc/diff.  9/17/20: Weekly Cbc/diff + IgG level.  9/23/20: Labs + Q month IVIG if IgG level < 600.  9/29/20: Appointment with Dr. Boyd.    Fadumo Caldera, RN, BSN, OCN on 8/26/2020 at 2:25 PM

## 2020-08-26 NOTE — NURSING NOTE
Writer spoke with Tricia while she was in infusion today. Writer instructed Tricia with Dr. Boyd's message:     Sabina Boyd MD Anderson, Amanda E, RN               Hemoglobin remains stable.  She can taper down her prednisone to 30 mg daily, as we discussed       Tricia verbalized understanding. She reports she will switch to 30mg on Friday then decrease by 10mg weekly, as discussed with Dr. Boyd 8/25.     She also reports she has noticed a few new red spots on her right lower arm. Spots on right arm are flat, dark red, non-pruritic. There are approximately 4 spots on right arm varying in size from pencil eraser size to quarter size. Writer instructed Tricia to continue to monitor and notify us if she notices more spots. Tricia verbalized understanding and is in agreement with the plan.     Anabell Hawkins RN, BSN, COLLEEN  RN Care Coordinator  [facility]  [phone]

## 2020-08-27 ENCOUNTER — MYC MEDICAL ADVICE (OUTPATIENT)
Dept: ONCOLOGY | Facility: CLINIC | Age: 80
End: 2020-08-27

## 2020-08-27 DIAGNOSIS — D59.19 OTHER AUTOIMMUNE HEMOLYTIC ANEMIAS: Primary | ICD-10-CM

## 2020-08-27 NOTE — PATIENT INSTRUCTIONS
IVIG given 08/26  Monthly IVIG scheduled 09/23, 10/21, 11/18  Weekly CBC scheduled 9/2, 9/9, 9/17, 9/24  All standing labs scheduled 9/24  Appt with Dr. Boyd scheduled 09/29  Anabell Hawkins RN on 8/27/2020 at 10:34 AM

## 2020-08-27 NOTE — TELEPHONE ENCOUNTER
Per Dr. Boyd, ok to recheck another CBC today.       Tricia called writer back. Writer explained Dr. Boyd also said she is on steroids which can thin the skin and make her more prone to bruising. Maandrew reported she felt it is not necessary to recheck the labs if they looked stable yesterday. Tricia will call us if she wants to schedule a lab redraw. She agrees with Dr. Boyd that it is most likely bruising due to her thin skin. She will continue to monitor and keep us informed.     Anabell Hawkins, RN, BSN, COLLEEN  RN Care Coordinator  [facility]  [phone]

## 2020-08-28 NOTE — PROGRESS NOTES
Pt called today because she wanted to know if her insurance approved her to have her IVIG infusion on Monday?  Leroy, our pharmacy liaison, is off today, but spoke with the pharmacy liaison that is covering for Leroy.  He submitted a request to patient's insurance company.  He said that Lupe from infusion finance will call patient once they hear back regarding request submission.  Called patient back to let her know that she will be hearing from Lupe from [employer] as soon as they hear back regarding approval to proceed with IVIG.  Pt happy with plan.  No further questions or concerns at this time.     Bilobed Transposition Flap Text: The defect edges were debeveled with a #15 scalpel blade.  Given the location of the defect and the proximity to free margins a bilobed transposition flap was deemed most appropriate.  Using a sterile surgical marker, an appropriate bilobe flap drawn around the defect.    The area thus outlined was incised deep to adipose tissue with a #15 scalpel blade.  The skin margins were undermined to an appropriate distance in all directions utilizing iris scissors.

## 2020-08-28 NOTE — PROGRESS NOTES
"  Tricia Sosa is a 79 year old female who is being evaluated via a billable telephone visit.      The patient has been notified of following:     \"This telephone visit will be conducted via a call between you and your physician/provider. We have found that certain health care needs can be provided without the need for a physical exam.  This service lets us provide the care you need with a short phone conversation.  If a prescription is necessary we can send it directly to your pharmacy.  If lab work is needed we can place an order for that and you can then stop by our lab to have the test done at a later time.    Telephone visits are billed at different rates depending on your insurance coverage. During this emergency period, for some insurers they may be billed the same as an in-person visit.  Please reach out to your insurance provider with any questions.    If during the course of the call the physician/provider feels a telephone visit is not appropriate, you will not be charged for this service.\"    Patient has given verbal consent for Telephone visit?  Yes    What phone number would you like to be contacted at? [phone]    [facility]    Phone call duration:  minutes    Carmencita Evans CMA on 8/28/2020 at 1:48 PM        "

## 2020-08-31 ENCOUNTER — TELEPHONE (OUTPATIENT)
Dept: SLEEP MEDICINE | Facility: CLINIC | Age: 80
End: 2020-08-31

## 2020-08-31 ENCOUNTER — VIRTUAL VISIT (OUTPATIENT)
Dept: SLEEP MEDICINE | Facility: CLINIC | Age: 80
End: 2020-08-31
Payer: MEDICARE

## 2020-08-31 DIAGNOSIS — G47.33 OBSTRUCTIVE SLEEP APNEA: Primary | ICD-10-CM

## 2020-08-31 DIAGNOSIS — G47.33 MILD OBSTRUCTIVE SLEEP APNEA: Primary | ICD-10-CM

## 2020-08-31 DIAGNOSIS — G47.10 HYPERSOMNIA: ICD-10-CM

## 2020-08-31 NOTE — PROGRESS NOTES
"  [facility]  Patient Name: Tricia Sosa  MRN: [id number]  : 1940  Date of Clinic Visit: 2020  Primary Care Provider: Emily Church  Referring Provider: No ref. provider found    Tricia Sosa is a 79 year old female who is being evaluated via a billable telephone visit.    The patient has been notified of following: \"This telephone visit will be conducted via a call between you and your physician/provider. We have found that certain health care needs can be provided without the need for an in-person physical exam. This service lets us provide the care you need with a video conversation. If a prescription is necessary we can send it directly to your pharmacy. If lab work is needed we can place an order for that and you can then stop by our lab to have the test done at a later time. Video visits are billed at different rates depending on your insurance coverage. Please reach out to your insurance provider with any questions. If during the course of the call the physician/provider feels a video visit is not appropriate, you will not be charged for this service.\"    Video-Visit Details  Type of service: Telephone Visit  Video Start Time: 09:30  Video End Time: 10:00  Originating Location (pt. 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   Seen by Dr. Avina in 2019 for EDS (ESS = 7) and snoring. " PSG on 11/3/19: BMI = 19.7, combined AHI 6.5 and REM AHI 33.4.   She was offered CPAP at that time but declined, wanting to try behavioral approaches like sleeping on her side before CPAP, but this isn't working. Furthermore, her sleep has been worsened by the resumption of Prednisone in July for her 3rd recurrence of Hemolytic Anemia. She wants to try the CPAP now. She does not want to try a MAD.   She is not using melatonin but will sometimes use Benadryl to help sleep. No caffeine in the PM. ESS = 4. DORCAS = 16 with difficulty primarily staying asleep, some nights only getting 3-4. On average she gets 5-6 hours of sleep/night.    ASSESSMENT AND PLAN:  # Obstructive Sleep Apnea, mild: not previously treated with MAD or CPAP. She is not interested in MAD, would like referral for CPAP.   - DME referral   - Follow-up Dr. Jaquez 3 months    The plan was discussed formulated with Dr. Armando.    Thomas Jaquez MD  Sleep Medicine Fellow  [facility] [place]    Sleep Staff Addendum Video  I have discussed, but did not see the patient with the sleep fellow and agree with the plan as documented.    Gurjit Armando MD    PHYSICAL EXAMINATION:  VITALS: this was a phone visit and therefore no vital signs were obtained.  NEURO: language comprehension intact, normal language expression, no dysarthria.    REVIEW OF SYSTEMS: 12-point RoS negative except as per HPI.    Current Outpatient Medications   Medication Sig Dispense Refill     cyanocobalamin (VITAMIN  B-12) 1000 MCG tablet   3     folic acid (FOLVITE) 1 MG tablet   3     hydrochlorothiazide (HYDRODIURIL) 12.5 MG tablet Take 1 tablet (12.5 mg) by mouth daily 30 tablet 1     hydrocortisone butyrate (LOCOID) 0.1 % SOLN solution Apply to scalp bid prn 60 mL 11     ketoconazole (NIZORAL) 2 % external shampoo Use every other day to scalp as needed 120 mL 11     levothyroxine (SYNTHROID/LEVOTHROID) 150 MCG tablet Take 1 tablet (150 mcg) by mouth daily 90  tablet 3     potassium chloride ER (KLOR-CON M) 20 MEQ CR tablet Take 1 tablet (20 mEq) by mouth daily 90 tablet 3     predniSONE (DELTASONE) 10 MG tablet Take 1 tablet (10 mg) by mouth daily 80 tablet 0     predniSONE (DELTASONE) 20 MG tablet Take 3 tablets (60 mg) by mouth daily (Patient taking differently: Take 40 mg by mouth daily ) 90 tablet 0     sulfamethoxazole-trimethoprim (BACTRIM DS/SEPTRA DS) 800-160 MG tablet Take 1 pill orally on Mon, Wed and Friday 45 tablet 3     triamcinolone (KENALOG) 0.1 % external cream        UNABLE TO FIND privigen inj monthly       Past Medical History:   Diagnosis Date     WARD (dyspnea on exertion)      External hemorrhoids without mention of complication 6/27/05     Hemolytic anemia (H)     autoimmune     Hypothyroidism      IgA deficiency (H)      Other malaise and fatigue      Other severe protein-calorie malnutrition      Thyroid disease      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 The final document, although reviewed, may contain errors. For corrections, please contact the office.

## 2020-08-31 NOTE — TELEPHONE ENCOUNTER
----- Message from Carmencita Evans CMA sent at 8/27/2020  7:58 AM CDT -----  Regarding: Need cpap order  Received voicemail message from patient. She has decided that she would like to start CPAP therapy. She last saw Dr. Rogers 11/2/19. Would someone be able to place order? Or does she need to be seen? Please advise.       Thank you-    Lynn SHUKLA

## 2020-08-31 NOTE — TELEPHONE ENCOUNTER
Please call the patient to confirm that she would like to use [employer] as the DME. Order is in the chart by . Thanks.

## 2020-09-02 ENCOUNTER — HOSPITAL ENCOUNTER (OUTPATIENT)
Facility: CLINIC | Age: 80
Setting detail: SPECIMEN
Discharge: HOME | End: 2020-09-02
Attending: INTERNAL MEDICINE | Admitting: INTERNAL MEDICINE
Payer: MEDICARE

## 2020-09-02 DIAGNOSIS — D59.19 OTHER AUTOIMMUNE HEMOLYTIC ANEMIAS: ICD-10-CM

## 2020-09-02 LAB
BASOPHILS # BLD AUTO: 0 10E9/L (ref 0–0.2)
BASOPHILS NFR BLD AUTO: 0 %
DIFFERENTIAL METHOD BLD: ABNORMAL
EOSINOPHIL # BLD AUTO: 0 10E9/L (ref 0–0.7)
EOSINOPHIL NFR BLD AUTO: 0.2 %
ERYTHROCYTE [DISTWIDTH] IN BLOOD BY AUTOMATED COUNT: 12.8 % (ref 10–15)
HCT VFR BLD AUTO: 33.8 % (ref 35–47)
HGB BLD-MCNC: 9.9 G/DL (ref 11.7–15.7)
IMM GRANULOCYTES # BLD: 0 10E9/L (ref 0–0.4)
IMM GRANULOCYTES NFR BLD: 0.4 %
LYMPHOCYTES # BLD AUTO: 1.3 10E9/L (ref 0.8–5.3)
LYMPHOCYTES NFR BLD AUTO: 27 %
MCH RBC QN AUTO: 28.2 PG (ref 26.5–33)
MCHC RBC AUTO-ENTMCNC: 29.3 G/DL (ref 31.5–36.5)
MCV RBC AUTO: 96 FL (ref 78–100)
MONOCYTES # BLD AUTO: 0.5 10E9/L (ref 0–1.3)
MONOCYTES NFR BLD AUTO: 11.3 %
NEUTROPHILS # BLD AUTO: 2.9 10E9/L (ref 1.6–8.3)
NEUTROPHILS NFR BLD AUTO: 61.1 %
NRBC # BLD AUTO: 0 10*3/UL
NRBC BLD AUTO-RTO: 0 /100
PLATELET # BLD AUTO: 129 10E9/L (ref 150–450)
RBC # BLD AUTO: 3.51 10E12/L (ref 3.8–5.2)
WBC # BLD AUTO: 4.7 10E9/L (ref 4–11)

## 2020-09-02 PROCEDURE — 85025 COMPLETE CBC W/AUTO DIFF WBC: CPT | Performed by: INTERNAL MEDICINE

## 2020-09-02 PROCEDURE — 36415 COLL VENOUS BLD VENIPUNCTURE: CPT

## 2020-09-02 NOTE — PROGRESS NOTES
Medical Assistant Note:  Tricia Sosa presents today for blood draw.    Patient seen by provider today: No.   present during visit today: Not Applicable.    Concerns: No Concerns.    Procedure:  Lab draw site: left antecub, Needle type: butterfly, Gauge: 23.    Post Assessment:  Labs drawn without difficulty: Yes.    Discharge Plan:  Departure Mode: Ambulatory.    Face to Face Time: 10.    Barbara Corrigan, CMA

## 2020-09-09 ENCOUNTER — HOSPITAL ENCOUNTER (OUTPATIENT)
Facility: CLINIC | Age: 80
Setting detail: SPECIMEN
Discharge: HOME | End: 2020-09-09
Attending: INTERNAL MEDICINE | Admitting: INTERNAL MEDICINE
Payer: MEDICARE

## 2020-09-09 ENCOUNTER — PATIENT OUTREACH (OUTPATIENT)
Dept: ONCOLOGY | Facility: CLINIC | Age: 80
End: 2020-09-09

## 2020-09-09 DIAGNOSIS — D59.19 OTHER AUTOIMMUNE HEMOLYTIC ANEMIAS: ICD-10-CM

## 2020-09-09 LAB
BASOPHILS # BLD AUTO: 0 10E9/L (ref 0–0.2)
BASOPHILS NFR BLD AUTO: 0.5 %
DIFFERENTIAL METHOD BLD: ABNORMAL
EOSINOPHIL # BLD AUTO: 0 10E9/L (ref 0–0.7)
EOSINOPHIL NFR BLD AUTO: 0.2 %
ERYTHROCYTE [DISTWIDTH] IN BLOOD BY AUTOMATED COUNT: 12.9 % (ref 10–15)
HCT VFR BLD AUTO: 34.9 % (ref 35–47)
HGB BLD-MCNC: 10.3 G/DL (ref 11.7–15.7)
IMM GRANULOCYTES # BLD: 0 10E9/L (ref 0–0.4)
IMM GRANULOCYTES NFR BLD: 0.2 %
LYMPHOCYTES # BLD AUTO: 1.2 10E9/L (ref 0.8–5.3)
LYMPHOCYTES NFR BLD AUTO: 28.1 %
MCH RBC QN AUTO: 27.2 PG (ref 26.5–33)
MCHC RBC AUTO-ENTMCNC: 29.5 G/DL (ref 31.5–36.5)
MCV RBC AUTO: 92 FL (ref 78–100)
MONOCYTES # BLD AUTO: 0.5 10E9/L (ref 0–1.3)
MONOCYTES NFR BLD AUTO: 10.7 %
NEUTROPHILS # BLD AUTO: 2.6 10E9/L (ref 1.6–8.3)
NEUTROPHILS NFR BLD AUTO: 60.3 %
NRBC # BLD AUTO: 0 10*3/UL
NRBC BLD AUTO-RTO: 0 /100
PLATELET # BLD AUTO: 115 10E9/L (ref 150–450)
PLATELET # BLD EST: ABNORMAL 10*3/UL
RBC # BLD AUTO: 3.78 10E12/L (ref 3.8–5.2)
RBC MORPH BLD: ABNORMAL
WBC # BLD AUTO: 4.3 10E9/L (ref 4–11)

## 2020-09-09 PROCEDURE — 85025 COMPLETE CBC W/AUTO DIFF WBC: CPT | Performed by: INTERNAL MEDICINE

## 2020-09-09 PROCEDURE — 36415 COLL VENOUS BLD VENIPUNCTURE: CPT

## 2020-09-09 NOTE — PROGRESS NOTES
Sabina Boyd MD  You;  Cancer Clinic Rn Pool 4 minutes ago (12:02 PM)       Yes, would continue to monitor it for now.    Message text

## 2020-09-09 NOTE — PROGRESS NOTES
Writer was requested by Dr. Boyd to call Tricia with her recent hgb results from today:    Results for TRICIA MARRUFO (MRN [id number]) as of 9/9/2020 11:48   Ref. Range 9/9/2020 10:09   Hemoglobin Latest Ref Range: 11.7 - 15.7 g/dL 10.3 (L)       Tricia verbalized understanding and reports she is feeling well.     She reports she noticed her platelets are still dropping. Tricia denies any new bruising or bleeding symptoms. She reports she did not have any more of the bruising that she had noticed 8/27.    Writer will notify Dr. Boyd of Tricia's concern. Per Tricia, no need for a call back if Dr. Boyd says to just continue to monitor.     Anabell Hawkins, RN, BSN, COLLEEN  RN Care Coordinator  [facility]  [phone]

## 2020-09-10 ENCOUNTER — PATIENT OUTREACH (OUTPATIENT)
Dept: ONCOLOGY | Facility: CLINIC | Age: 80
End: 2020-09-10

## 2020-09-10 NOTE — PROGRESS NOTES
Sabina Boyd MD Anderson, Amanda E, RN    Cc: P  Cancer Clinic Rn Pool    Caller: Unspecified (Today,  8:13 AM)               Yes, it's okay to wean down by 5 mg every week instead of 10 mg.        Writer called Tricia back and explained Dr. Boyd's recommendation. Tricia verbalized understanding. She reports she has 5 mg tablets and will wean down 5 mg every week.     Tricia had no further questions or concerns at this time.     Anabell Hawkins RN, BSN, MAOM  RN Care Coordinator  [facility]  [phone]

## 2020-09-10 NOTE — PROGRESS NOTES
S-(situation): Tricia called in to clinic with questions about her prednisone. She reports she is concerned about decreasing by 10 mg every week    B-(background): Dr. Boyd started her on a course of prednisone in July 2020 for a relapse of her autoimmune hemolytic anemia    A-(assessment): Tricia reports she is currently on 20 mg and is due to decrease down to 10 mg tomorrow. She reports she feels like it's a little fast to wean down and is wondering if it would be okay to go down by 5 mg increments instead or if Dr. Boyd wants to keep it at 10 mg increments.    Per Dr. Boyd's note from 08/25/20, the patient was increased to taper down by 10 mg every week.     R-(recommendations): Writer will route to Dr. Boyd for recommendations and call Tricia back.     Anabell Hawkins, RN, BSN, COLLEEN  RN Care Coordinator  [facility]  [phone]

## 2020-09-11 ENCOUNTER — DOCUMENTATION ONLY (OUTPATIENT)
Dept: SLEEP MEDICINE | Facility: CLINIC | Age: 80
End: 2020-09-11
Payer: MEDICARE

## 2020-09-11 DIAGNOSIS — G47.14 HYPERSOMNIA DUE TO ANOTHER MEDICAL CONDITION: ICD-10-CM

## 2020-09-11 DIAGNOSIS — G47.33 OBSTRUCTIVE SLEEP APNEA (ADULT) (PEDIATRIC): Primary | ICD-10-CM

## 2020-09-11 NOTE — PROGRESS NOTES
Patient was offered choice of vendor and chose [facility].  Patient Tricia Sosa was set up at [facility] on September 11, 2020. Patient received a [employer] DreamStation Auto. Pressures were set at 5-15 cm H2O.   Patient s ramp is 5 cm H2O for 30 minutes and FLEX/EPR is EPR, 2.  Patient received a Resmed Mask name: F20  Full Face mask size Medium, heated tubing and heated humidifier.  Patient does need to meet compliance. Patient has a follow up on 11/16/20 with Dr. Armando.    Parul Cotton

## 2020-09-14 ENCOUNTER — DOCUMENTATION ONLY (OUTPATIENT)
Dept: SLEEP MEDICINE | Facility: CLINIC | Age: 80
End: 2020-09-14

## 2020-09-14 NOTE — PROGRESS NOTES
3 DAY STM VISIT    Diagnostic AHI: 6.5    PSG    Patient contacted for 3 day STM visit    Confirmed with patient at time of call- Yes Patient is still interested in STM service     Subjective measures: Patient used one night.  She struggled in the middle of the night with mask fit.   However was able to get the leak fixed and returned to sleep.      Replacement device: No  STM ordered by provider: Yes     Device type: Auto-CPAP  PAP settings from order::  CPAP min 5 cm  H20       CPAP max 15 cm  H20        Mask type:    Full Face Mask     Device settings from machine      Min CPAP 5            Max CPAP 15         Assessment: patient has used for one night.   Action plan: Patient to have 14 day STM visit. Patient has a follow up visit scheduled:   yes within 31-90 days of set up.     Total time spent on accessing and  interpreting remote patient PAP therapy data  12 minutes  Total time spent counseling, coaching  and reviewing PAP therapy data with patient  5 minutes  [id number] no

## 2020-09-16 ENCOUNTER — HOSPITAL ENCOUNTER (OUTPATIENT)
Facility: CLINIC | Age: 80
Setting detail: SPECIMEN
Discharge: HOME | End: 2020-09-16
Attending: INTERNAL MEDICINE | Admitting: INTERNAL MEDICINE
Payer: MEDICARE

## 2020-09-16 DIAGNOSIS — D59.19 OTHER AUTOIMMUNE HEMOLYTIC ANEMIAS: ICD-10-CM

## 2020-09-16 LAB
ALBUMIN SERPL-MCNC: 4 G/DL (ref 3.4–5)
ALP SERPL-CCNC: 89 U/L (ref 40–150)
ALT SERPL W P-5'-P-CCNC: 22 U/L (ref 0–50)
ANION GAP SERPL CALCULATED.3IONS-SCNC: 4 MMOL/L (ref 3–14)
AST SERPL W P-5'-P-CCNC: 28 U/L (ref 0–45)
BASOPHILS # BLD AUTO: 0 10E9/L (ref 0–0.2)
BASOPHILS NFR BLD AUTO: 0.4 %
BILIRUB SERPL-MCNC: 0.8 MG/DL (ref 0.2–1.3)
BUN SERPL-MCNC: 12 MG/DL (ref 7–30)
CALCIUM SERPL-MCNC: 8.6 MG/DL (ref 8.5–10.1)
CHLORIDE SERPL-SCNC: 107 MMOL/L (ref 94–109)
CO2 SERPL-SCNC: 28 MMOL/L (ref 20–32)
CREAT SERPL-MCNC: 0.74 MG/DL (ref 0.52–1.04)
DIFFERENTIAL METHOD BLD: ABNORMAL
EOSINOPHIL # BLD AUTO: 0 10E9/L (ref 0–0.7)
EOSINOPHIL NFR BLD AUTO: 0.4 %
ERYTHROCYTE [DISTWIDTH] IN BLOOD BY AUTOMATED COUNT: 12.9 % (ref 10–15)
GFR SERPL CREATININE-BSD FRML MDRD: 77 ML/MIN/{1.73_M2}
GLUCOSE SERPL-MCNC: 82 MG/DL (ref 70–99)
HCT VFR BLD AUTO: 36 % (ref 35–47)
HGB BLD-MCNC: 10.8 G/DL (ref 11.7–15.7)
IMM GRANULOCYTES # BLD: 0 10E9/L (ref 0–0.4)
IMM GRANULOCYTES NFR BLD: 0.2 %
LDH SERPL L TO P-CCNC: 214 U/L (ref 81–234)
LYMPHOCYTES # BLD AUTO: 1.5 10E9/L (ref 0.8–5.3)
LYMPHOCYTES NFR BLD AUTO: 32.6 %
MCH RBC QN AUTO: 26.7 PG (ref 26.5–33)
MCHC RBC AUTO-ENTMCNC: 30 G/DL (ref 31.5–36.5)
MCV RBC AUTO: 89 FL (ref 78–100)
MONOCYTES # BLD AUTO: 0.6 10E9/L (ref 0–1.3)
MONOCYTES NFR BLD AUTO: 12 %
NEUTROPHILS # BLD AUTO: 2.5 10E9/L (ref 1.6–8.3)
NEUTROPHILS NFR BLD AUTO: 54.4 %
NRBC # BLD AUTO: 0 10*3/UL
NRBC BLD AUTO-RTO: 0 /100
PLATELET # BLD AUTO: 164 10E9/L (ref 150–450)
POTASSIUM SERPL-SCNC: 3.5 MMOL/L (ref 3.4–5.3)
PROT SERPL-MCNC: 6.5 G/DL (ref 6.8–8.8)
RBC # BLD AUTO: 4.04 10E12/L (ref 3.8–5.2)
RETICS # AUTO: 52.1 10E9/L (ref 25–95)
RETICS/RBC NFR AUTO: 1.3 % (ref 0.5–2)
SODIUM SERPL-SCNC: 139 MMOL/L (ref 133–144)
WBC # BLD AUTO: 4.6 10E9/L (ref 4–11)

## 2020-09-16 PROCEDURE — 85025 COMPLETE CBC W/AUTO DIFF WBC: CPT | Performed by: INTERNAL MEDICINE

## 2020-09-16 PROCEDURE — 85045 AUTOMATED RETICULOCYTE COUNT: CPT | Performed by: INTERNAL MEDICINE

## 2020-09-16 PROCEDURE — 82784 ASSAY IGA/IGD/IGG/IGM EACH: CPT | Performed by: INTERNAL MEDICINE

## 2020-09-16 PROCEDURE — 80053 COMPREHEN METABOLIC PANEL: CPT | Performed by: INTERNAL MEDICINE

## 2020-09-16 PROCEDURE — 83615 LACTATE (LD) (LDH) ENZYME: CPT | Performed by: INTERNAL MEDICINE

## 2020-09-16 PROCEDURE — 36415 COLL VENOUS BLD VENIPUNCTURE: CPT

## 2020-09-16 PROCEDURE — 83010 ASSAY OF HAPTOGLOBIN QUANT: CPT | Performed by: INTERNAL MEDICINE

## 2020-09-17 ENCOUNTER — PATIENT OUTREACH (OUTPATIENT)
Dept: ONCOLOGY | Facility: CLINIC | Age: 80
End: 2020-09-17

## 2020-09-17 LAB
HAPTOGLOB SERPL-MCNC: 8 MG/DL (ref 32–197)
IGA SERPL-MCNC: <2 MG/DL (ref 84–499)
IGG SERPL-MCNC: 744 MG/DL (ref 610–1616)
IGM SERPL-MCNC: 18 MG/DL (ref 35–242)

## 2020-09-17 NOTE — PROGRESS NOTES
Results for TRICIA MARRUFO (MRN [id number]) as of 9/17/2020 13:37   Ref. Range 9/16/2020 10:08   IGG Latest Ref Range: 610 - 1,616 mg/dL 744     Writer called Tricia and notified her, per Dr. Boyd she does not need her IVIG appointment next week, 9/23.     Tricia verbalized understanding and had no further questions or concerns at this time.     Writer confirmed her upcoming appointments.    Anabell Hawkins, RN, BSN, MAOM  RN Care Coordinator  [facility]  [phone]

## 2020-09-24 ENCOUNTER — HOSPITAL ENCOUNTER (OUTPATIENT)
Facility: CLINIC | Age: 80
Setting detail: SPECIMEN
Discharge: HOME | End: 2020-09-24
Attending: INTERNAL MEDICINE | Admitting: INTERNAL MEDICINE
Payer: MEDICARE

## 2020-09-24 ENCOUNTER — HOSPITAL ENCOUNTER (OUTPATIENT)
Facility: CLINIC | Age: 80
Setting detail: SPECIMEN
End: 2020-09-24
Attending: INTERNAL MEDICINE
Payer: MEDICARE

## 2020-09-24 DIAGNOSIS — D59.19 OTHER AUTOIMMUNE HEMOLYTIC ANEMIAS: ICD-10-CM

## 2020-09-24 PROBLEM — M62.81 GENERALIZED MUSCLE WEAKNESS: Status: RESOLVED | Noted: 2020-02-18 | Resolved: 2020-09-24

## 2020-09-24 PROBLEM — M54.6 PAIN IN THORACIC SPINE: Status: RESOLVED | Noted: 2020-02-18 | Resolved: 2020-09-24

## 2020-09-24 PROBLEM — M54.59 MECHANICAL LOW BACK PAIN: Status: RESOLVED | Noted: 2020-02-18 | Resolved: 2020-09-24

## 2020-09-24 LAB
ALBUMIN SERPL-MCNC: 4.1 G/DL (ref 3.4–5)
ALP SERPL-CCNC: 93 U/L (ref 40–150)
ALT SERPL W P-5'-P-CCNC: 24 U/L (ref 0–50)
ANION GAP SERPL CALCULATED.3IONS-SCNC: 1 MMOL/L (ref 3–14)
AST SERPL W P-5'-P-CCNC: 27 U/L (ref 0–45)
BASOPHILS # BLD AUTO: 0 10E9/L (ref 0–0.2)
BASOPHILS NFR BLD AUTO: 0.6 %
BILIRUB SERPL-MCNC: 0.6 MG/DL (ref 0.2–1.3)
BUN SERPL-MCNC: 13 MG/DL (ref 7–30)
CALCIUM SERPL-MCNC: 9 MG/DL (ref 8.5–10.1)
CHLORIDE SERPL-SCNC: 108 MMOL/L (ref 94–109)
CO2 SERPL-SCNC: 32 MMOL/L (ref 20–32)
CREAT SERPL-MCNC: 0.82 MG/DL (ref 0.52–1.04)
DIFFERENTIAL METHOD BLD: ABNORMAL
EOSINOPHIL # BLD AUTO: 0 10E9/L (ref 0–0.7)
EOSINOPHIL NFR BLD AUTO: 0.3 %
ERYTHROCYTE [DISTWIDTH] IN BLOOD BY AUTOMATED COUNT: 13.4 % (ref 10–15)
GFR SERPL CREATININE-BSD FRML MDRD: 68 ML/MIN/{1.73_M2}
GLUCOSE SERPL-MCNC: 74 MG/DL (ref 70–99)
HCT VFR BLD AUTO: 36.5 % (ref 35–47)
HGB BLD-MCNC: 10.7 G/DL (ref 11.7–15.7)
IMM GRANULOCYTES # BLD: 0 10E9/L (ref 0–0.4)
IMM GRANULOCYTES NFR BLD: 0.3 %
LDH SERPL L TO P-CCNC: 212 U/L (ref 81–234)
LYMPHOCYTES # BLD AUTO: 1.1 10E9/L (ref 0.8–5.3)
LYMPHOCYTES NFR BLD AUTO: 34.3 %
MCH RBC QN AUTO: 26.3 PG (ref 26.5–33)
MCHC RBC AUTO-ENTMCNC: 29.3 G/DL (ref 31.5–36.5)
MCV RBC AUTO: 90 FL (ref 78–100)
MONOCYTES # BLD AUTO: 0.4 10E9/L (ref 0–1.3)
MONOCYTES NFR BLD AUTO: 14 %
NEUTROPHILS # BLD AUTO: 1.6 10E9/L (ref 1.6–8.3)
NEUTROPHILS NFR BLD AUTO: 50.5 %
NRBC # BLD AUTO: 0 10*3/UL
NRBC BLD AUTO-RTO: 0 /100
PLATELET # BLD AUTO: 153 10E9/L (ref 150–450)
POTASSIUM SERPL-SCNC: 3.6 MMOL/L (ref 3.4–5.3)
PROT SERPL-MCNC: 6.7 G/DL (ref 6.8–8.8)
RBC # BLD AUTO: 4.07 10E12/L (ref 3.8–5.2)
RETICS # AUTO: 44.4 10E9/L (ref 25–95)
RETICS/RBC NFR AUTO: 1.1 % (ref 0.5–2)
SODIUM SERPL-SCNC: 141 MMOL/L (ref 133–144)
WBC # BLD AUTO: 3.2 10E9/L (ref 4–11)

## 2020-09-24 PROCEDURE — 83615 LACTATE (LD) (LDH) ENZYME: CPT | Performed by: INTERNAL MEDICINE

## 2020-09-24 PROCEDURE — 82784 ASSAY IGA/IGD/IGG/IGM EACH: CPT | Performed by: INTERNAL MEDICINE

## 2020-09-24 PROCEDURE — 85045 AUTOMATED RETICULOCYTE COUNT: CPT | Performed by: INTERNAL MEDICINE

## 2020-09-24 PROCEDURE — 85025 COMPLETE CBC W/AUTO DIFF WBC: CPT | Performed by: INTERNAL MEDICINE

## 2020-09-24 PROCEDURE — 36415 COLL VENOUS BLD VENIPUNCTURE: CPT

## 2020-09-24 PROCEDURE — 80053 COMPREHEN METABOLIC PANEL: CPT | Performed by: INTERNAL MEDICINE

## 2020-09-24 PROCEDURE — 83010 ASSAY OF HAPTOGLOBIN QUANT: CPT | Performed by: INTERNAL MEDICINE

## 2020-09-24 NOTE — PROGRESS NOTES
"DISCHARGE REPORT    Progress reporting period is from 2-18-20 to 3-17-20.       SUBJECTIVE  Subjective changes noted by patient:  .  Subjective: Notes able to walk further, feels she is walking with better posture.  Does not do all exercises at once; several questions regarding diet and \"am I getting enough protein, my son thinks I need more\"    Current pain level is 0/10  .     Previous pain level was  0/10  .   Changes in function:  Yes (See Goal flowsheet attached for changes in current functional level)  Adverse reaction to treatment or activity: None    OBJECTIVE  Changes noted in objective findings:  Patient has failed to return to therapy so current objective findings are unknown.  Objective: glut med=3+/5, improved LE flexibility;      ASSESSMENT/PLAN  Updated problem list and treatment plan: Diagnosis 1: spine pain/weakness   Pain -  self management, education, directional preference exercise and home program  Decreased ROM/flexibility - manual therapy and therapeutic exercise  Decreased strength - therapeutic exercise and therapeutic activities  Decreased function - therapeutic activities  Impaired posture - neuro re-education  STG/LTGs have been met or progress has been made towards goals:  Yes (See Goal flow sheet completed today.)  Assessment of Progress: The patient has not returned to therapy. Current status is unknown.  Self Management Plans:  Patient is independent in a home treatment program.  Patient is independent in self management of symptoms.  I have re-evaluated this patient and find that the nature, scope, duration and intensity of the therapy is appropriate for the medical condition of the patient.  Tricia continues to require the following intervention to meet STG and LTG's:  PT intervention is no longer required to meet STG/LTG.    Recommendations:  This patient is ready to be discharged from therapy and continue their home treatment program.    Please refer to the daily flowsheet for " treatment today, total treatment time and time spent performing 1:1 timed codes.

## 2020-09-25 LAB
HAPTOGLOB SERPL-MCNC: 8 MG/DL (ref 32–197)
IGA SERPL-MCNC: <2 MG/DL (ref 84–499)
IGG SERPL-MCNC: 674 MG/DL (ref 610–1616)
IGM SERPL-MCNC: 27 MG/DL (ref 35–242)

## 2020-09-28 ENCOUNTER — DOCUMENTATION ONLY (OUTPATIENT)
Dept: SLEEP MEDICINE | Facility: CLINIC | Age: 80
End: 2020-09-28
Payer: MEDICARE

## 2020-09-28 NOTE — PROGRESS NOTES
14 DAY STM VISIT    Diagnostic AHI: 6.5  PSG      Subjective measures:   Patient pulls mask off without knowing during the night.  She is sleeping longer than she is used to. Patient worried about meeting medicare requirements.     Assessment: Pt meeting objective benchmarks.  Patient meeting subjective benchmarks. Told patient we would work with her to meet her data requirements.     Action plan: pt to have 30 day STM visit.    Device type: Auto-CPAP    PAP settings: CPAP min 5  cm  H20       CPAP max 15 cm  H20       90th % pressure 8  cm  H20    Mask type:  Full Face Mask    Objective measures: 14 day rolling measures         Compliance  85 %     % of night spent in large leak  2 % last  upload      AHI 2.74   last  upload      Average number of minutes 445          Objective measure goal  Compliance   Goal >70%  Leak   Goal < 10%  AHI  Goal < 5  Usage  Goal >240      Total time spent on accessing and interpreting remote patient PAP therapy data  10 minutes      Total time spent counseling, coaching  and reviewing PAP therapy data with patient  11 minutes      [id number]  [id number] no (3 day STM)

## 2020-09-29 ENCOUNTER — VIRTUAL VISIT (OUTPATIENT)
Dept: ONCOLOGY | Facility: CLINIC | Age: 80
End: 2020-09-29
Attending: INTERNAL MEDICINE
Payer: MEDICARE

## 2020-09-29 DIAGNOSIS — D59.19 OTHER AUTOIMMUNE HEMOLYTIC ANEMIAS: Primary | ICD-10-CM

## 2020-09-29 PROCEDURE — 99441 ZZC PHYSICIAN TELEPHONE EVALUATION 5-10 MIN: CPT | Mod: 95 | Performed by: INTERNAL MEDICINE

## 2020-09-29 PROCEDURE — 40001009 ZZH VIDEO/TELEPHONE VISIT; NO CHARGE

## 2020-09-29 NOTE — PROGRESS NOTES
"Tricia Sosa is a 79 year old female who is being evaluated via a billable telephone visit.      The patient has been notified of following:     \"This telephone visit will be conducted via a call between you and your physician/provider. We have found that certain health care needs can be provided without the need for a physical exam.  This service lets us provide the care you need with a short phone conversation.  If a prescription is necessary we can send it directly to your pharmacy.  If lab work is needed we can place an order for that and you can then stop by our lab to have the test done at a later time.    Telephone visits are billed at different rates depending on your insurance coverage. During this emergency period, for some insurers they may be billed the same as an in-person visit.  Please reach out to your insurance provider with any questions.    If during the course of the call the physician/provider feels a telephone visit is not appropriate, you will not be charged for this service.\"    Patient has given verbal consent for Telephone visit?  Yes    What phone number would you like to be contacted at? [phone]     How would you like to obtain your AVS? MyChart     - WHEN SHOULD PT GET FLU SHOT?    Barbara Corrigan [facility]    Hematology/Oncology Established Patient Follow-up Note      Today's Date: 09/29/20    Reason for Follow-up: Hemolytic anemia-autoimmune; IgA deficiency    HISTORY OF PRESENT ILLNESS: Tricia Sosa is a 79 year old female who presents with autoimmune hemolytic anemia and IgA deficiency.  She previously saw Dr. Matos and then Dr. Summers.  She was previously seen at [facility].    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  She was very sick at the time and had severe diarrhea.  She lost about 40 pounds in weight.  The workup revealed that she had markedly diminished CD4 counts of 48 and was diagnosed " with CMV colitis.  Since then she has been followed in hematology clinic at [place] until recently.  She was noted to have anemia which was fairly long-standing.  She was initially referred for anemia in 2004 and also had a bone marrow biopsy done at that time which revealed hypercellular bone marrow with 70-80% cellularity and normal trilineage hematopoiesis and no morphologic features of MDS or lymphoproliferation.  Her anemia was attributed to her chronic underlying disease.  At the next evaluation in 2002 on 4 aggressive anemia there was no evidence of hemolysis, iron deficiency, B12 or folate deficiency.  Bone marrow biopsy was not pursued.  In summer of 2015 she had progressive fatigue, dyspnea on exertion and worsening anemia with a hemoglobin now of 9.  Workup at this time did suggest a hemolytic anemia with elevated LDH at 709, undetectable haptoglobin and elevated unconjugated bilirubin at 3.3.  Monospecific MARIKA was positive for both IgG and complement.  Cold agglutinin screen was positive with very low titer 1:64.  She was started on prednisone 60 mg daily with supplementation of iron folate and B12 starting 7/31/15.  Her prednisone has been slowly tapered and was discontinued off in January 2016.  She was last followed at [facility] on March 10, 2016 when she had stable hemoglobin.    She was followed by Dr. Matos and Dr. Summers.  Due to relapse of hemolytic anemia, she underwent another course of prednisone that has since been tapered off and rituximab x 4 weekly doses completed in 9/19/19.    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  She started weekly Rituxan on 7/31/20 x 4 doses, completed on 8/21/20.        INTERIM HISTORY: Tricia says that she is doing well.  She has tapered her prednisone down to 5 mg a day a couple of days ago.          REVIEW OF SYSTEMS:   14 point ROS was reviewed and is negative other than as noted above in HPI.        HOME MEDICATIONS:  Current Outpatient Medications   Medication Sig Dispense Refill     cyanocobalamin (VITAMIN  B-12) 1000 MCG tablet   3     folic acid (FOLVITE) 1 MG tablet   3     hydrochlorothiazide (HYDRODIURIL) 12.5 MG tablet Take 1 tablet (12.5 mg) by mouth daily 30 tablet 1     hydrocortisone butyrate (LOCOID) 0.1 % SOLN solution Apply to scalp bid prn 60 mL 11     ketoconazole (NIZORAL) 2 % external shampoo Use every other day to scalp as needed 120 mL 11     levothyroxine (SYNTHROID/LEVOTHROID) 150 MCG tablet Take 1 tablet (150 mcg) by mouth daily 90 tablet 3     potassium chloride ER (KLOR-CON M) 20 MEQ CR tablet Take 1 tablet (20 mEq) by mouth daily 90 tablet 3     predniSONE (DELTASONE) 10 MG tablet Take 1 tablet (10 mg) by mouth daily (Patient taking differently: Take 5 mg by mouth daily ) 80 tablet 0     sulfamethoxazole-trimethoprim (BACTRIM DS/SEPTRA DS) 800-160 MG tablet Take 1 pill orally on Mon, Wed and Friday 45 tablet 3     triamcinolone (KENALOG) 0.1 % external cream        UNABLE TO FIND privigen inj monthly       predniSONE (DELTASONE) 20 MG tablet Take 3 tablets (60 mg) by mouth daily (Patient not taking: Reported on 9/29/2020) 90 tablet 0         ALLERGIES:  Allergies   Allergen Reactions     Doxycycline          PAST MEDICAL HISTORY:  Past Medical History:   Diagnosis Date     WARD (dyspnea on exertion)      External hemorrhoids without mention of complication 6/27/05     Hemolytic anemia (H)     autoimmune     Hypothyroidism      IgA deficiency (H)      Other malaise and fatigue      Other severe protein-calorie malnutrition      Thyroid disease      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, and posterior vitreous detachment     Urinary tract infection, site not specified     Recurrent UTI's         PAST SURGICAL HISTORY:  Past Surgical History:   Procedure Laterality Date     C LIGATE FALLOPIAN TUBE       COLONOSCOPY N/A 4/26/2016    Procedure:  COLONOSCOPY;  Surgeon: Dontae Del Rio MD;  Location: [facility]     HC COLONOSCOPY W BIOPSY  4/26/00     HC COLONOSCOPY W BIOPSY  10/8/03     HC COLONOSCOPY W BIOPSY  6/27/05    REPEAT IN 5 YEARS     HC CYSTOSCOPY,INSERT URETERAL STENT      Ureteral stent insertion     HC FLEX SIGMOIDOSCOPY W BIOPSY  5/30/00     HC LAPAROSCOPY, SURGICAL; CHOLECYSTECTOMY  1992      THYROIDECTOMY       LIGATION OF HEMORRHOID(S)      Hemorrhoidectomy     UPPER GI ENDOSCOPY,BIOPSY  10/01/01         SOCIAL HISTORY:  Social History     Socioeconomic History     Marital status:      Spouse name: Not on file     Number of children: Not on file     Years of education: Not on file     Highest education level: Not on file   Occupational History     Not on file   Social Needs     Financial resource strain: Not on file     Food insecurity     Worry: Not on file     Inability: Not on file     Transportation needs     Medical: Not on file     Non-medical: Not on file   Tobacco Use     Smoking status: Never Smoker     Smokeless tobacco: Never Used   Substance and Sexual Activity     Alcohol use: No     Alcohol/week: 0.0 standard drinks     Frequency: Never     Drug use: No     Sexual activity: Never   Lifestyle     Physical activity     Days per week: Not on file     Minutes per session: Not on file     Stress: Not on file   Relationships     Social connections     Talks on phone: Not on file     Gets together: Not on file     Attends Rastafari service: Not on file     Active member of club or organization: Not on file     Attends meetings of clubs or organizations: Not on file     Relationship status: Not on file     Intimate partner violence     Fear of current or ex partner: Not on file     Emotionally abused: Not on file     Physically abused: Not on file     Forced sexual activity: Not on file   Other Topics Concern     Parent/sibling w/ CABG, MI or angioplasty before 65F 55M? Not Asked   Social History Narrative     Not on file          FAMILY HISTORY:  Family History   Problem Relation Age of Onset     Colon Cancer Mother 90     Cerebrovascular Disease Father          at age 85     Dementia Brother      Dementia Brother      Pancreatic Cancer Brother      Breast Cancer Sister          PHYSICAL EXAM:  Vital signs:  LMP  (LMP Unknown)    Phone visit.      LABS:  CBC RESULTS:   Recent Labs   Lab Test 20  1012   WBC 3.2*   RBC 4.07   HGB 10.7*   HCT 36.5   MCV 90   MCH 26.3*   MCHC 29.3*   RDW 13.4            ASSESSMENT/PLAN:  Tricia Sosa is a 79 year old female with:      1. 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 Common variable immunodeficiency disorder  4. Splenomegaly        1) Autoimmune hemolytic anemia: She recently had relapse in 2020, and started on prednisone, as well as weekly rituximab infusions x 4, completed on 20.  Her hemoglobin has slowly improved back up to her baseline and has been stable in the 10's the last several weeks.    We also previously talked about other treatment options if her disease becomes refractory to steroids, such as splenectomy.      -continue prednisone taper.  She is currently on 5 mg daily, which she will take the rest of the week.  Next week she will go down to 5 mg every other day for a week, and then stop prednisone.  -she will now resume monthly lab checks  -follow-up with me in 2 months    2) CVID:   -continue IVIG.  She gets it monthly, if IgG is <600      Sabina Boyd MD  Hematology/Oncology  [facility]          Phone call duration: 6 minutes

## 2020-09-29 NOTE — LETTER
"    9/29/2020         RE: Tricia Sosa  [id number]  [place] [address]        Dear Colleague,    Thank you for referring your patient, Tricia Sosa, to the [facility]. Please see a copy of my visit note below.    Tricia Sosa is a 79 year old female who is being evaluated via a billable telephone visit.      The patient has been notified of following:     \"This telephone visit will be conducted via a call between you and your physician/provider. We have found that certain health care needs can be provided without the need for a physical exam.  This service lets us provide the care you need with a short phone conversation.  If a prescription is necessary we can send it directly to your pharmacy.  If lab work is needed we can place an order for that and you can then stop by our lab to have the test done at a later time.    Telephone visits are billed at different rates depending on your insurance coverage. During this emergency period, for some insurers they may be billed the same as an in-person visit.  Please reach out to your insurance provider with any questions.    If during the course of the call the physician/provider feels a telephone visit is not appropriate, you will not be charged for this service.\"    Patient has given verbal consent for Telephone visit?  Yes    What phone number would you like to be contacted at? [phone]     How would you like to obtain your AVS? MyChart     - WHEN SHOULD PT GET FLU SHOT?    Barbara Corrigan [facility]    Hematology/Oncology Established Patient Follow-up Note      Today's Date: 09/29/20    Reason for Follow-up: Hemolytic anemia-autoimmune; IgA deficiency    HISTORY OF PRESENT ILLNESS: Tricia Sosa is a 79 year old female who presents with autoimmune hemolytic anemia and IgA deficiency.  She previously saw Dr. Matos and then Dr. Summers.  She was previously seen at [facility].    TREATMENT SUMMARY:  Tricia" has been diagnosed with IgA deficiency since her around 2000.  She was very sick at the time and had severe diarrhea.  She lost about 40 pounds in weight.  The workup revealed that she had markedly diminished CD4 counts of 48 and was diagnosed with CMV colitis.  Since then she has been followed in hematology clinic at [place] until recently.  She was noted to have anemia which was fairly long-standing.  She was initially referred for anemia in 2004 and also had a bone marrow biopsy done at that time which revealed hypercellular bone marrow with 70-80% cellularity and normal trilineage hematopoiesis and no morphologic features of MDS or lymphoproliferation.  Her anemia was attributed to her chronic underlying disease.  At the next evaluation in 2002 on 4 aggressive anemia there was no evidence of hemolysis, iron deficiency, B12 or folate deficiency.  Bone marrow biopsy was not pursued.  In summer of 2015 she had progressive fatigue, dyspnea on exertion and worsening anemia with a hemoglobin now of 9.  Workup at this time did suggest a hemolytic anemia with elevated LDH at 709, undetectable haptoglobin and elevated unconjugated bilirubin at 3.3.  Monospecific MARIKA was positive for both IgG and complement.  Cold agglutinin screen was positive with very low titer 1:64.  She was started on prednisone 60 mg daily with supplementation of iron folate and B12 starting 7/31/15.  Her prednisone has been slowly tapered and was discontinued off in January 2016.  She was last followed at [facility] on March 10, 2016 when she had stable hemoglobin.    She was followed by Dr. Matos and Dr. Summers.  Due to relapse of hemolytic anemia, she underwent another course of prednisone that has since been tapered off and rituximab x 4 weekly doses completed in 9/19/19.    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  She started weekly Rituxan on 7/31/20 x 4 doses, completed on  8/21/20.        INTERIM HISTORY: Tricia says that she is doing well.  She has tapered her prednisone down to 5 mg a day a couple of days ago.          REVIEW OF SYSTEMS:   14 point ROS was reviewed and is negative other than as noted above in HPI.       HOME MEDICATIONS:  Current Outpatient Medications   Medication Sig Dispense Refill     cyanocobalamin (VITAMIN  B-12) 1000 MCG tablet   3     folic acid (FOLVITE) 1 MG tablet   3     hydrochlorothiazide (HYDRODIURIL) 12.5 MG tablet Take 1 tablet (12.5 mg) by mouth daily 30 tablet 1     hydrocortisone butyrate (LOCOID) 0.1 % SOLN solution Apply to scalp bid prn 60 mL 11     ketoconazole (NIZORAL) 2 % external shampoo Use every other day to scalp as needed 120 mL 11     levothyroxine (SYNTHROID/LEVOTHROID) 150 MCG tablet Take 1 tablet (150 mcg) by mouth daily 90 tablet 3     potassium chloride ER (KLOR-CON M) 20 MEQ CR tablet Take 1 tablet (20 mEq) by mouth daily 90 tablet 3     predniSONE (DELTASONE) 10 MG tablet Take 1 tablet (10 mg) by mouth daily (Patient taking differently: Take 5 mg by mouth daily ) 80 tablet 0     sulfamethoxazole-trimethoprim (BACTRIM DS/SEPTRA DS) 800-160 MG tablet Take 1 pill orally on Mon, Wed and Friday 45 tablet 3     triamcinolone (KENALOG) 0.1 % external cream        UNABLE TO FIND privigen inj monthly       predniSONE (DELTASONE) 20 MG tablet Take 3 tablets (60 mg) by mouth daily (Patient not taking: Reported on 9/29/2020) 90 tablet 0         ALLERGIES:  Allergies   Allergen Reactions     Doxycycline          PAST MEDICAL HISTORY:  Past Medical History:   Diagnosis Date     WARD (dyspnea on exertion)      External hemorrhoids without mention of complication 6/27/05     Hemolytic anemia (H)     autoimmune     Hypothyroidism      IgA deficiency (H)      Other malaise and fatigue      Other severe protein-calorie malnutrition      Thyroid disease      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, and posterior  vitreous detachment     Urinary tract infection, site not specified     Recurrent UTI's         PAST SURGICAL HISTORY:  Past Surgical History:   Procedure Laterality Date     C LIGATE FALLOPIAN TUBE       COLONOSCOPY N/A 4/26/2016    Procedure: COLONOSCOPY;  Surgeon: Dontae Del Rio MD;  Location:  GI      COLONOSCOPY W BIOPSY  4/26/00     HC COLONOSCOPY W BIOPSY  10/8/03     HC COLONOSCOPY W BIOPSY  6/27/05    REPEAT IN 5 YEARS     HC CYSTOSCOPY,INSERT URETERAL STENT      Ureteral stent insertion     HC FLEX SIGMOIDOSCOPY W BIOPSY  5/30/00     HC LAPAROSCOPY, SURGICAL; CHOLECYSTECTOMY  1992      THYROIDECTOMY       LIGATION OF HEMORRHOID(S)      Hemorrhoidectomy     UPPER GI ENDOSCOPY,BIOPSY  10/01/01         SOCIAL HISTORY:  Social History     Socioeconomic History     Marital status:      Spouse name: Not on file     Number of children: Not on file     Years of education: Not on file     Highest education level: Not on file   Occupational History     Not on file   Social Needs     Financial resource strain: Not on file     Food insecurity     Worry: Not on file     Inability: Not on file     Transportation needs     Medical: Not on file     Non-medical: Not on file   Tobacco Use     Smoking status: Never Smoker     Smokeless tobacco: Never Used   Substance and Sexual Activity     Alcohol use: No     Alcohol/week: 0.0 standard drinks     Frequency: Never     Drug use: No     Sexual activity: Never   Lifestyle     Physical activity     Days per week: Not on file     Minutes per session: Not on file     Stress: Not on file   Relationships     Social connections     Talks on phone: Not on file     Gets together: Not on file     Attends Nondenominational service: Not on file     Active member of club or organization: Not on file     Attends meetings of clubs or organizations: Not on file     Relationship status: Not on file     Intimate partner violence     Fear of current or ex partner: Not on file      Emotionally abused: Not on file     Physically abused: Not on file     Forced sexual activity: Not on file   Other Topics Concern     Parent/sibling w/ CABG, MI or angioplasty before 65F 55M? Not Asked   Social History Narrative     Not on file         FAMILY HISTORY:  Family History   Problem Relation Age of Onset     Colon Cancer Mother 90     Cerebrovascular Disease Father          at age 85     Dementia Brother      Dementia Brother      Pancreatic Cancer Brother      Breast Cancer Sister          PHYSICAL EXAM:  Vital signs:  LMP  (LMP Unknown)    Phone visit.      LABS:  CBC RESULTS:   Recent Labs   Lab Test 20  1012   WBC 3.2*   RBC 4.07   HGB 10.7*   HCT 36.5   MCV 90   MCH 26.3*   MCHC 29.3*   RDW 13.4            ASSESSMENT/PLAN:  Tricia Sosa is a 79 year old female with:      1. 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 Common variable immunodeficiency disorder  4. Splenomegaly        1) Autoimmune hemolytic anemia: She recently had relapse in 2020, and started on prednisone, as well as weekly rituximab infusions x 4, completed on 20.  Her hemoglobin has slowly improved back up to her baseline and has been stable in the 10's the last several weeks.    We also previously talked about other treatment options if her disease becomes refractory to steroids, such as splenectomy.      -continue prednisone taper.  She is currently on 5 mg daily, which she will take the rest of the week.  Next week she will go down to 5 mg every other day for a week, and then stop prednisone.  -she will now resume monthly lab checks  -follow-up with me in 2 months    2) CVID:   -continue IVIG.  She gets it monthly, if IgG is <600      Sabina Boyd MD  Hematology/Oncology  [facility]          Phone call duration: 6 minutes        Again, thank you for allowing me to participate  in the care of your patient.        Sincerely,        Sabina Boyd MD

## 2020-09-29 NOTE — LETTER
"    9/29/2020         RE: Tricia Sosa  [address]  [facility] [id number]        Dear Colleague,    Thank you for referring your patient, Tricia Sosa, to the [facility]. Please see a copy of my visit note below.    Triica Sosa is a 79 year old female who is being evaluated via a billable telephone visit.      The patient has been notified of following:     \"This telephone visit will be conducted via a call between you and your physician/provider. We have found that certain health care needs can be provided without the need for a physical exam.  This service lets us provide the care you need with a short phone conversation.  If a prescription is necessary we can send it directly to your pharmacy.  If lab work is needed we can place an order for that and you can then stop by our lab to have the test done at a later time.    Telephone visits are billed at different rates depending on your insurance coverage. During this emergency period, for some insurers they may be billed the same as an in-person visit.  Please reach out to your insurance provider with any questions.    If during the course of the call the physician/provider feels a telephone visit is not appropriate, you will not be charged for this service.\"    Patient has given verbal consent for Telephone visit?  Yes    What phone number would you like to be contacted at? [phone]     How would you like to obtain your AVS? MyChart     - WHEN SHOULD PT GET FLU SHOT?    Barbara Corrigan [facility]    Hematology/Oncology Established Patient Follow-up Note      Today's Date: 09/29/20    Reason for Follow-up: Hemolytic anemia-autoimmune; IgA deficiency    HISTORY OF PRESENT ILLNESS: Tricia Sosa is a 79 year old female who presents with autoimmune hemolytic anemia and IgA deficiency.  She previously saw Dr. Matos and then Dr. Summers.  She was previously seen at [facility].    TREATMENT SUMMARY:  Tricia" has been diagnosed with IgA deficiency since her around 2000.  She was very sick at the time and had severe diarrhea.  She lost about 40 pounds in weight.  The workup revealed that she had markedly diminished CD4 counts of 48 and was diagnosed with CMV colitis.  Since then she has been followed in hematology clinic at [place] until recently.  She was noted to have anemia which was fairly long-standing.  She was initially referred for anemia in 2004 and also had a bone marrow biopsy done at that time which revealed hypercellular bone marrow with 70-80% cellularity and normal trilineage hematopoiesis and no morphologic features of MDS or lymphoproliferation.  Her anemia was attributed to her chronic underlying disease.  At the next evaluation in 2002 on 4 aggressive anemia there was no evidence of hemolysis, iron deficiency, B12 or folate deficiency.  Bone marrow biopsy was not pursued.  In summer of 2015 she had progressive fatigue, dyspnea on exertion and worsening anemia with a hemoglobin now of 9.  Workup at this time did suggest a hemolytic anemia with elevated LDH at 709, undetectable haptoglobin and elevated unconjugated bilirubin at 3.3.  Monospecific MARIKA was positive for both IgG and complement.  Cold agglutinin screen was positive with very low titer 1:64.  She was started on prednisone 60 mg daily with supplementation of iron folate and B12 starting 7/31/15.  Her prednisone has been slowly tapered and was discontinued off in January 2016.  She was last followed at [facility] on March 10, 2016 when she had stable hemoglobin.    She was followed by Dr. Matos and Dr. Summers.  Due to relapse of hemolytic anemia, she underwent another course of prednisone that has since been tapered off and rituximab x 4 weekly doses completed in 9/19/19.    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  She started weekly Rituxan on 7/31/20 x 4 doses, completed on  8/21/20.        INTERIM HISTORY: Tricia says that she is doing well.  She has tapered her prednisone down to 5 mg a day a couple of days ago.          REVIEW OF SYSTEMS:   14 point ROS was reviewed and is negative other than as noted above in HPI.       HOME MEDICATIONS:  Current Outpatient Medications   Medication Sig Dispense Refill     cyanocobalamin (VITAMIN  B-12) 1000 MCG tablet   3     folic acid (FOLVITE) 1 MG tablet   3     hydrochlorothiazide (HYDRODIURIL) 12.5 MG tablet Take 1 tablet (12.5 mg) by mouth daily 30 tablet 1     hydrocortisone butyrate (LOCOID) 0.1 % SOLN solution Apply to scalp bid prn 60 mL 11     ketoconazole (NIZORAL) 2 % external shampoo Use every other day to scalp as needed 120 mL 11     levothyroxine (SYNTHROID/LEVOTHROID) 150 MCG tablet Take 1 tablet (150 mcg) by mouth daily 90 tablet 3     potassium chloride ER (KLOR-CON M) 20 MEQ CR tablet Take 1 tablet (20 mEq) by mouth daily 90 tablet 3     predniSONE (DELTASONE) 10 MG tablet Take 1 tablet (10 mg) by mouth daily (Patient taking differently: Take 5 mg by mouth daily ) 80 tablet 0     sulfamethoxazole-trimethoprim (BACTRIM DS/SEPTRA DS) 800-160 MG tablet Take 1 pill orally on Mon, Wed and Friday 45 tablet 3     triamcinolone (KENALOG) 0.1 % external cream        UNABLE TO FIND privigen inj monthly       predniSONE (DELTASONE) 20 MG tablet Take 3 tablets (60 mg) by mouth daily (Patient not taking: Reported on 9/29/2020) 90 tablet 0         ALLERGIES:  Allergies   Allergen Reactions     Doxycycline          PAST MEDICAL HISTORY:  Past Medical History:   Diagnosis Date     WARD (dyspnea on exertion)      External hemorrhoids without mention of complication 6/27/05     Hemolytic anemia (H)     autoimmune     Hypothyroidism      IgA deficiency (H)      Other malaise and fatigue      Other severe protein-calorie malnutrition      Thyroid disease      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, and posterior  vitreous detachment     Urinary tract infection, site not specified     Recurrent UTI's         PAST SURGICAL HISTORY:  Past Surgical History:   Procedure Laterality Date     C LIGATE FALLOPIAN TUBE       COLONOSCOPY N/A 4/26/2016    Procedure: COLONOSCOPY;  Surgeon: Dontae Del Rio MD;  Location:  GI      COLONOSCOPY W BIOPSY  4/26/00     HC COLONOSCOPY W BIOPSY  10/8/03     HC COLONOSCOPY W BIOPSY  6/27/05    REPEAT IN 5 YEARS     HC CYSTOSCOPY,INSERT URETERAL STENT      Ureteral stent insertion     HC FLEX SIGMOIDOSCOPY W BIOPSY  5/30/00     HC LAPAROSCOPY, SURGICAL; CHOLECYSTECTOMY  1992      THYROIDECTOMY       LIGATION OF HEMORRHOID(S)      Hemorrhoidectomy     UPPER GI ENDOSCOPY,BIOPSY  10/01/01         SOCIAL HISTORY:  Social History     Socioeconomic History     Marital status:      Spouse name: Not on file     Number of children: Not on file     Years of education: Not on file     Highest education level: Not on file   Occupational History     Not on file   Social Needs     Financial resource strain: Not on file     Food insecurity     Worry: Not on file     Inability: Not on file     Transportation needs     Medical: Not on file     Non-medical: Not on file   Tobacco Use     Smoking status: Never Smoker     Smokeless tobacco: Never Used   Substance and Sexual Activity     Alcohol use: No     Alcohol/week: 0.0 standard drinks     Frequency: Never     Drug use: No     Sexual activity: Never   Lifestyle     Physical activity     Days per week: Not on file     Minutes per session: Not on file     Stress: Not on file   Relationships     Social connections     Talks on phone: Not on file     Gets together: Not on file     Attends Voodoo service: Not on file     Active member of club or organization: Not on file     Attends meetings of clubs or organizations: Not on file     Relationship status: Not on file     Intimate partner violence     Fear of current or ex partner: Not on file      Emotionally abused: Not on file     Physically abused: Not on file     Forced sexual activity: Not on file   Other Topics Concern     Parent/sibling w/ CABG, MI or angioplasty before 65F 55M? Not Asked   Social History Narrative     Not on file         FAMILY HISTORY:  Family History   Problem Relation Age of Onset     Colon Cancer Mother 90     Cerebrovascular Disease Father          at age 85     Dementia Brother      Dementia Brother      Pancreatic Cancer Brother      Breast Cancer Sister          PHYSICAL EXAM:  Vital signs:  LMP  (LMP Unknown)    Phone visit.      LABS:  CBC RESULTS:   Recent Labs   Lab Test 20  1012   WBC 3.2*   RBC 4.07   HGB 10.7*   HCT 36.5   MCV 90   MCH 26.3*   MCHC 29.3*   RDW 13.4            ASSESSMENT/PLAN:  Tricia Sosa is a 79 year old female with:      1. 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 Common variable immunodeficiency disorder  4. Splenomegaly        1) Autoimmune hemolytic anemia: She recently had relapse in 2020, and started on prednisone, as well as weekly rituximab infusions x 4, completed on 20.  Her hemoglobin has slowly improved back up to her baseline and has been stable in the 10's the last several weeks.    We also previously talked about other treatment options if her disease becomes refractory to steroids, such as splenectomy.      -continue prednisone taper.  She is currently on 5 mg daily, which she will take the rest of the week.  Next week she will go down to 5 mg every other day for a week, and then stop prednisone.  -she will now resume monthly lab checks  -follow-up with me in 2 months    2) CVID:   -continue IVIG.  She gets it monthly, if IgG is <600      Sabina Boyd MD  Hematology/Oncology  [facility]          Phone call duration: 6 minutes        Again, thank you for allowing me to participate  in the care of your patient.        Sincerely,        Sabina Boyd MD

## 2020-10-13 ENCOUNTER — DOCUMENTATION ONLY (OUTPATIENT)
Dept: SLEEP MEDICINE | Facility: CLINIC | Age: 80
End: 2020-10-13

## 2020-10-13 NOTE — PROGRESS NOTES
30 DAY [facility] VISIT    Diagnostic AHI: 6.5    PSG      Subjective measures:   Pt states things are going well and has no issues or complaints.  Pt is benefiting from therapy.      Assessment: Pt meeting objective benchmarks.  Patient meeting subjective benchmarks.   Action plan:   Patient has scheduled a follow up visit with sleep fellow   Device type: Auto-CPAP  PAP settings: CPAP min 5 cm  H20     CPAP max 15 cm  H20         90th % pressure 8.2  cm  H20    Mask type:  Full Face Mask    Objective measures: 14 day rolling measures         Compliance  100 %     % of night spent in large leak  0 % last  upload      AHI 1.89   last  upload      Average number of minutes 496          Objective measure goal  Compliance   Goal >70%  Leak   Goal < 10%  AHI  Goal < 5  Usage  Goal >240      Total time spent on accessing and interpreting remote patient PAP therapy data  10 minutes    Total time spent counseling, coaching  and reviewing PAP therapy data with patient  3 minutes    [id number] no this call  [id number]  at 3 or 14 day [facility]

## 2020-10-15 ENCOUNTER — HOSPITAL ENCOUNTER (OUTPATIENT)
Facility: CLINIC | Age: 80
Setting detail: SPECIMEN
Discharge: HOME | End: 2020-10-15
Attending: INTERNAL MEDICINE | Admitting: INTERNAL MEDICINE
Payer: MEDICARE

## 2020-10-15 VITALS — TEMPERATURE: 97.1 F

## 2020-10-15 DIAGNOSIS — D59.19 OTHER AUTOIMMUNE HEMOLYTIC ANEMIA (H): ICD-10-CM

## 2020-10-15 LAB
ALBUMIN SERPL-MCNC: 3.4 G/DL (ref 3.4–5)
ALP SERPL-CCNC: 111 U/L (ref 40–150)
ALT SERPL W P-5'-P-CCNC: 23 U/L (ref 0–50)
ANION GAP SERPL CALCULATED.3IONS-SCNC: 7 MMOL/L (ref 3–14)
AST SERPL W P-5'-P-CCNC: 31 U/L (ref 0–45)
BASOPHILS # BLD AUTO: 0 10E9/L (ref 0–0.2)
BASOPHILS NFR BLD AUTO: 0.4 %
BILIRUB SERPL-MCNC: 0.5 MG/DL (ref 0.2–1.3)
BUN SERPL-MCNC: 12 MG/DL (ref 7–30)
CALCIUM SERPL-MCNC: 8.1 MG/DL (ref 8.5–10.1)
CHLORIDE SERPL-SCNC: 107 MMOL/L (ref 94–109)
CO2 SERPL-SCNC: 26 MMOL/L (ref 20–32)
CREAT SERPL-MCNC: 0.69 MG/DL (ref 0.52–1.04)
DIFFERENTIAL METHOD BLD: ABNORMAL
EOSINOPHIL # BLD AUTO: 0.1 10E9/L (ref 0–0.7)
EOSINOPHIL NFR BLD AUTO: 1.1 %
ERYTHROCYTE [DISTWIDTH] IN BLOOD BY AUTOMATED COUNT: 14.3 % (ref 10–15)
GFR SERPL CREATININE-BSD FRML MDRD: 82 ML/MIN/{1.73_M2}
GLUCOSE SERPL-MCNC: 92 MG/DL (ref 70–99)
HCT VFR BLD AUTO: 34 % (ref 35–47)
HGB BLD-MCNC: 10.2 G/DL (ref 11.7–15.7)
IMM GRANULOCYTES # BLD: 0 10E9/L (ref 0–0.4)
IMM GRANULOCYTES NFR BLD: 0.4 %
LDH SERPL L TO P-CCNC: 246 U/L (ref 81–234)
LYMPHOCYTES # BLD AUTO: 1.4 10E9/L (ref 0.8–5.3)
LYMPHOCYTES NFR BLD AUTO: 24.5 %
MCH RBC QN AUTO: 25.8 PG (ref 26.5–33)
MCHC RBC AUTO-ENTMCNC: 30 G/DL (ref 31.5–36.5)
MCV RBC AUTO: 86 FL (ref 78–100)
MONOCYTES # BLD AUTO: 0.5 10E9/L (ref 0–1.3)
MONOCYTES NFR BLD AUTO: 9.7 %
NEUTROPHILS # BLD AUTO: 3.6 10E9/L (ref 1.6–8.3)
NEUTROPHILS NFR BLD AUTO: 63.9 %
NRBC # BLD AUTO: 0 10*3/UL
NRBC BLD AUTO-RTO: 0 /100
PLATELET # BLD AUTO: 174 10E9/L (ref 150–450)
POTASSIUM SERPL-SCNC: 4.1 MMOL/L (ref 3.4–5.3)
PROT SERPL-MCNC: 6 G/DL (ref 6.8–8.8)
RBC # BLD AUTO: 3.95 10E12/L (ref 3.8–5.2)
RETICS # AUTO: 63.2 10E9/L (ref 25–95)
RETICS/RBC NFR AUTO: 1.6 % (ref 0.5–2)
SODIUM SERPL-SCNC: 140 MMOL/L (ref 133–144)
WBC # BLD AUTO: 5.6 10E9/L (ref 4–11)

## 2020-10-15 PROCEDURE — 82784 ASSAY IGA/IGD/IGG/IGM EACH: CPT | Performed by: INTERNAL MEDICINE

## 2020-10-15 PROCEDURE — 250N000011 HC RX IP 250 OP 636: Performed by: INTERNAL MEDICINE

## 2020-10-15 PROCEDURE — 83010 ASSAY OF HAPTOGLOBIN QUANT: CPT | Performed by: INTERNAL MEDICINE

## 2020-10-15 PROCEDURE — 85045 AUTOMATED RETICULOCYTE COUNT: CPT | Performed by: INTERNAL MEDICINE

## 2020-10-15 PROCEDURE — 99207 PR NO CHARGE LOS: CPT

## 2020-10-15 PROCEDURE — 85025 COMPLETE CBC W/AUTO DIFF WBC: CPT | Performed by: INTERNAL MEDICINE

## 2020-10-15 PROCEDURE — 83615 LACTATE (LD) (LDH) ENZYME: CPT | Performed by: INTERNAL MEDICINE

## 2020-10-15 PROCEDURE — 90662 IIV NO PRSV INCREASED AG IM: CPT | Performed by: INTERNAL MEDICINE

## 2020-10-15 PROCEDURE — G0008 ADMIN INFLUENZA VIRUS VAC: HCPCS | Performed by: INTERNAL MEDICINE

## 2020-10-15 PROCEDURE — 36415 COLL VENOUS BLD VENIPUNCTURE: CPT

## 2020-10-15 PROCEDURE — 80053 COMPREHEN METABOLIC PANEL: CPT | Performed by: INTERNAL MEDICINE

## 2020-10-15 RX ADMIN — INFLUENZA A VIRUS A/MICHIGAN/45/2015 X-275 (H1N1) ANTIGEN (FORMALDEHYDE INACTIVATED), INFLUENZA A VIRUS A/SINGAPORE/INFIMH-16-0019/2016 IVR-186 (H3N2) ANTIGEN (FORMALDEHYDE INACTIVATED), INFLUENZA B VIRUS B/PHUKET/3073/2013 ANTIGEN (FORMALDEHYDE INACTIVATED), AND INFLUENZA B VIRUS B/MARYLAND/15/2016 BX-69A ANTIGEN (FORMALDEHYDE INACTIVATED) 0.7 ML: 60; 60; 60; 60 INJECTION, SUSPENSION INTRAMUSCULAR at 10:13

## 2020-10-15 NOTE — PROGRESS NOTES
Medical Assistant Note:  Tricia Sosa presents today for lab draw.    Patient seen by provider today: No.   present during visit today: Not Applicable.    Concerns: No Concerns.    Procedure:  Labs drawn: .    Post Assessment:  Labs drawn without difficulty: Yes.    Discharge Plan:  Departure Mode: Ambulatory.    Face to Face Time: 10.    Angy Villagran, [facility]

## 2020-10-16 LAB
HAPTOGLOB SERPL-MCNC: <3 MG/DL (ref 32–197)
IGA SERPL-MCNC: <2 MG/DL (ref 84–499)
IGG SERPL-MCNC: 463 MG/DL (ref 610–1616)
IGM SERPL-MCNC: 15 MG/DL (ref 35–242)

## 2020-10-21 ENCOUNTER — INFUSION THERAPY VISIT (OUTPATIENT)
Dept: INFUSION THERAPY | Facility: CLINIC | Age: 80
End: 2020-10-21
Attending: INTERNAL MEDICINE
Payer: MEDICARE

## 2020-10-21 VITALS
RESPIRATION RATE: 16 BRPM | OXYGEN SATURATION: 98 % | BODY MASS INDEX: 21.54 KG/M2 | TEMPERATURE: 97.2 F | SYSTOLIC BLOOD PRESSURE: 122 MMHG | DIASTOLIC BLOOD PRESSURE: 69 MMHG | WEIGHT: 158.8 LBS | HEART RATE: 81 BPM

## 2020-10-21 DIAGNOSIS — D83.9 CVID (COMMON VARIABLE IMMUNODEFICIENCY) (H): Primary | ICD-10-CM

## 2020-10-21 PROBLEM — H52.203 MYOPIA OF BOTH EYES WITH ASTIGMATISM AND PRESBYOPIA: Status: ACTIVE | Noted: 2017-08-16

## 2020-10-21 PROBLEM — H25.13 NUCLEAR SENILE CATARACT OF BOTH EYES: Status: ACTIVE | Noted: 2017-08-16

## 2020-10-21 PROBLEM — G47.33 MILD OBSTRUCTIVE SLEEP APNEA: Status: ACTIVE | Noted: 2019-11-24

## 2020-10-21 PROBLEM — D59.19 OTHER AUTOIMMUNE HEMOLYTIC ANEMIAS: Status: ACTIVE | Noted: 2020-10-21

## 2020-10-21 PROBLEM — H52.4 MYOPIA OF BOTH EYES WITH ASTIGMATISM AND PRESBYOPIA: Status: ACTIVE | Noted: 2017-08-16

## 2020-10-21 PROBLEM — H52.13 MYOPIA OF BOTH EYES WITH ASTIGMATISM AND PRESBYOPIA: Status: ACTIVE | Noted: 2017-08-16

## 2020-10-21 PROCEDURE — 250N000011 HC RX IP 250 OP 636: Performed by: INTERNAL MEDICINE

## 2020-10-21 PROCEDURE — 96366 THER/PROPH/DIAG IV INF ADDON: CPT

## 2020-10-21 PROCEDURE — 96365 THER/PROPH/DIAG IV INF INIT: CPT

## 2020-10-21 PROCEDURE — 99207 PR NO CHARGE NURSE ONLY: CPT

## 2020-10-21 PROCEDURE — 96375 TX/PRO/DX INJ NEW DRUG ADDON: CPT

## 2020-10-21 RX ORDER — HEPARIN SODIUM,PORCINE 10 UNIT/ML
5 VIAL (ML) INTRAVENOUS
Status: CANCELLED | OUTPATIENT
Start: 2020-10-21

## 2020-10-21 RX ORDER — HEPARIN SODIUM (PORCINE) LOCK FLUSH IV SOLN 100 UNIT/ML 100 UNIT/ML
5 SOLUTION INTRAVENOUS
Status: CANCELLED | OUTPATIENT
Start: 2020-10-21

## 2020-10-21 RX ORDER — DIPHENHYDRAMINE HCL 25 MG
50 CAPSULE ORAL ONCE
Status: CANCELLED | OUTPATIENT
Start: 2020-10-21

## 2020-10-21 RX ORDER — ACETAMINOPHEN 325 MG/1
650 TABLET ORAL ONCE
Status: CANCELLED
Start: 2020-10-21

## 2020-10-21 RX ADMIN — HUMAN IMMUNOGLOBULIN G 35 G: 20 LIQUID INTRAVENOUS at 09:55

## 2020-10-21 RX ADMIN — HYDROCORTISONE SODIUM SUCCINATE 100 MG: 100 INJECTION, POWDER, FOR SOLUTION INTRAMUSCULAR; INTRAVENOUS at 09:35

## 2020-10-21 NOTE — PROGRESS NOTES
"Infusion Nursing Note:  Tricia Sosa presents today for IVIG.    Patient seen by provider today: No   present during visit today: Not Applicable.    Note:  Patient reports is feeling fine today.  Patient denies fevers, chills or signs of infection.  Patient reports mild discomfort below left ear and \"small lump\" which started 2 weeks ago, but is very mild and almost gone.  Patient denies sore throat.  No palpable lump/bump today.    IVIG Rate:  0.5ml/kg/hr x 15 minutes  1ml/kg/hr x 15 minutes  2ml/kg/hr x 15 minutes  3ml/kg/hr x 15 minutes  4ml/kg/hr until completion of IVIG      Intravenous Access:  Peripheral IV placed.  PIV site C/D/I.  PIV flushes well + excellent blood return.  PIV C/D/I with excellent blood return throughout IVIG treatment today.    Labs drawn on 10/15/20.  IgG = 463    Treatment Conditions:  Biological Infusion Checklist:  ~~~ NOTE: If the patient answers yes to any of the questions below, hold the infusion and contact ordering provider or on-call provider.    1. Have you recently had an elevated temperature, fever, chills, productive cough, coughing for 3 weeks or longer or hemoptysis, abnormal vital signs, night sweats,  chest pain or have you noticed a decrease in your appetite, unexplained weight loss or fatigue? No  2. Do you have any open wounds or new incisions? No  3. Do you have any recent or upcoming hospitalizations, surgeries or dental procedures? No  4. Do you currently have or recently have had any signs of illness or infection or are you on any antibiotics? Patient takes Bactrim Daily on Monday, Wednesday and Friday. No changes.  5. Have you had any new, sudden or worsening abdominal pain? No  6. Have you or anyone in your household received a live vaccination in the past 4 weeks? Please note:  No live vaccines while on biologic/chemotherapy until 6 months after the last treatment.  Patient can receive the flu vaccine (shot only) and the pneumovax.  It is optimal " for the patient to get these vaccines mid cycle, but they can be given at any time as long as it is not on the day of the infusion. No  7. Have you recently been diagnosed with any new nervous system diseases (ie. Multiple sclerosis, Guillain [place], seizures, neurological changes) or cancer diagnosis? No  8. Are you on any form of radiation or chemotherapy? No  9. Are you pregnant or breast feeding or do you have plans of pregnancy in the future? N/A  10. Have you been having any signs of worsening depression or suicidal ideations?  (benlysta only) No  11. Have there been any other new onset medical symptoms? No      Post Infusion Assessment:  Biologic Infusion Post Education: Call the triage nurse at your clinic or seek medical attention if you have chills and/or temperature greater than or equal to 100.5, uncontrolled nausea/vomiting, diarrhea, constipation, dizziness, shortness of breath, chest pain, heart palpitations, weakness or any other new or concerning symptoms, questions or concerns.  You cannot have any live virus vaccines prior to or during treatment or up to 6 months post infusion.  If you have an upcoming surgery, medical procedure or dental procedure during treatment, this should be discussed with your ordering physician and your surgeon/dentist.  If you are having any concerning symptom, if you are unsure if you should get your next infusion or wish to speak to a provider before your next infusion, please call your care coordinator or triage nurse at your clinic to notify them so we can adequately serve you.       Discharge Plan:   Discharge instructions reviewed with: Patient.  Patient and/or family verbalized understanding of discharge instructions and all questions answered.  Patient discharged in stable condition accompanied by: self.  Departure Mode: Ambulatory with cane.  11/12/20: Labs  11/18/20: IVIG  11/19/20: Appointment with Dr. Oliver Caldera, RN, BSN, OCN on 10/21/2020 at 12:03  PM

## 2020-11-12 ENCOUNTER — HOSPITAL ENCOUNTER (OUTPATIENT)
Facility: CLINIC | Age: 80
Setting detail: SPECIMEN
Discharge: HOME | End: 2020-11-12
Attending: INTERNAL MEDICINE | Admitting: INTERNAL MEDICINE
Payer: MEDICARE

## 2020-11-12 DIAGNOSIS — D59.19 OTHER AUTOIMMUNE HEMOLYTIC ANEMIA (H): ICD-10-CM

## 2020-11-12 LAB
ALBUMIN SERPL-MCNC: 3.7 G/DL (ref 3.4–5)
ALP SERPL-CCNC: 108 U/L (ref 40–150)
ALT SERPL W P-5'-P-CCNC: 24 U/L (ref 0–50)
ANION GAP SERPL CALCULATED.3IONS-SCNC: 5 MMOL/L (ref 3–14)
AST SERPL W P-5'-P-CCNC: 35 U/L (ref 0–45)
BASOPHILS # BLD AUTO: 0 10E9/L (ref 0–0.2)
BASOPHILS NFR BLD AUTO: 0.2 %
BILIRUB SERPL-MCNC: 0.8 MG/DL (ref 0.2–1.3)
BUN SERPL-MCNC: 12 MG/DL (ref 7–30)
CALCIUM SERPL-MCNC: 8.1 MG/DL (ref 8.5–10.1)
CHLORIDE SERPL-SCNC: 105 MMOL/L (ref 94–109)
CO2 SERPL-SCNC: 27 MMOL/L (ref 20–32)
CREAT SERPL-MCNC: 0.72 MG/DL (ref 0.52–1.04)
DIFFERENTIAL METHOD BLD: ABNORMAL
EOSINOPHIL # BLD AUTO: 0.1 10E9/L (ref 0–0.7)
EOSINOPHIL NFR BLD AUTO: 0.9 %
ERYTHROCYTE [DISTWIDTH] IN BLOOD BY AUTOMATED COUNT: 15.4 % (ref 10–15)
GFR SERPL CREATININE-BSD FRML MDRD: 80 ML/MIN/{1.73_M2}
GLUCOSE SERPL-MCNC: 106 MG/DL (ref 70–99)
HCT VFR BLD AUTO: 32.4 % (ref 35–47)
HGB BLD-MCNC: 9.9 G/DL (ref 11.7–15.7)
IMM GRANULOCYTES # BLD: 0 10E9/L (ref 0–0.4)
IMM GRANULOCYTES NFR BLD: 0.4 %
LDH SERPL L TO P-CCNC: 238 U/L (ref 81–234)
LYMPHOCYTES # BLD AUTO: 2 10E9/L (ref 0.8–5.3)
LYMPHOCYTES NFR BLD AUTO: 37.9 %
MCH RBC QN AUTO: 25.7 PG (ref 26.5–33)
MCHC RBC AUTO-ENTMCNC: 30.6 G/DL (ref 31.5–36.5)
MCV RBC AUTO: 84 FL (ref 78–100)
MONOCYTES # BLD AUTO: 0.7 10E9/L (ref 0–1.3)
MONOCYTES NFR BLD AUTO: 13.4 %
NEUTROPHILS # BLD AUTO: 2.5 10E9/L (ref 1.6–8.3)
NEUTROPHILS NFR BLD AUTO: 47.2 %
NRBC # BLD AUTO: 0 10*3/UL
NRBC BLD AUTO-RTO: 0 /100
PLATELET # BLD AUTO: 187 10E9/L (ref 150–450)
POTASSIUM SERPL-SCNC: 3.8 MMOL/L (ref 3.4–5.3)
PROT SERPL-MCNC: 6.6 G/DL (ref 6.8–8.8)
RBC # BLD AUTO: 3.85 10E12/L (ref 3.8–5.2)
RETICS # AUTO: 75.5 10E9/L (ref 25–95)
RETICS/RBC NFR AUTO: 2 % (ref 0.5–2)
SODIUM SERPL-SCNC: 137 MMOL/L (ref 133–144)
WBC # BLD AUTO: 5.4 10E9/L (ref 4–11)

## 2020-11-12 PROCEDURE — 82784 ASSAY IGA/IGD/IGG/IGM EACH: CPT | Performed by: INTERNAL MEDICINE

## 2020-11-12 PROCEDURE — 85045 AUTOMATED RETICULOCYTE COUNT: CPT | Performed by: INTERNAL MEDICINE

## 2020-11-12 PROCEDURE — 80053 COMPREHEN METABOLIC PANEL: CPT | Performed by: INTERNAL MEDICINE

## 2020-11-12 PROCEDURE — 83010 ASSAY OF HAPTOGLOBIN QUANT: CPT | Performed by: INTERNAL MEDICINE

## 2020-11-12 PROCEDURE — 83615 LACTATE (LD) (LDH) ENZYME: CPT | Performed by: INTERNAL MEDICINE

## 2020-11-12 PROCEDURE — 36415 COLL VENOUS BLD VENIPUNCTURE: CPT

## 2020-11-12 PROCEDURE — 85025 COMPLETE CBC W/AUTO DIFF WBC: CPT | Performed by: INTERNAL MEDICINE

## 2020-11-13 VITALS — WEIGHT: 158 LBS | BODY MASS INDEX: 21.43 KG/M2

## 2020-11-13 LAB
HAPTOGLOB SERPL-MCNC: <3 MG/DL (ref 32–197)
IGA SERPL-MCNC: <2 MG/DL (ref 84–499)
IGG SERPL-MCNC: 642 MG/DL (ref 610–1616)
IGM SERPL-MCNC: 12 MG/DL (ref 35–242)

## 2020-11-13 NOTE — PROGRESS NOTES
"Tricia Sosa is a 80 year old female who is being evaluated via a billable telephone visit.      The patient has been notified of following:     \"This telephone visit will be conducted via a call between you and your physician/provider. We have found that certain health care needs can be provided without the need for a physical exam.  This service lets us provide the care you need with a short phone conversation.  If a prescription is necessary we can send it directly to your pharmacy.  If lab work is needed we can place an order for that and you can then stop by our lab to have the test done at a later time.    Telephone visits are billed at different rates depending on your insurance coverage. During this emergency period, for some insurers they may be billed the same as an in-person visit.  Please reach out to your insurance provider with any questions.    If during the course of the call the physician/provider feels a telephone visit is not appropriate, you will not be charged for this service.\"    Patient has given verbal consent for Telephone visit?  Yes    What phone number would you like to be contacted at? [phone]    How would you like to obtain your AVS? Mail a copy    Phone call duration: 35 minutes    Thomas Jaquez MD  " Patient would like to  tomorrow at [place].

## 2020-11-16 ENCOUNTER — VIRTUAL VISIT (OUTPATIENT)
Dept: SLEEP MEDICINE | Facility: CLINIC | Age: 80
End: 2020-11-16
Payer: MEDICARE

## 2020-11-16 ENCOUNTER — PATIENT OUTREACH (OUTPATIENT)
Dept: ONCOLOGY | Facility: CLINIC | Age: 80
End: 2020-11-16

## 2020-11-16 ENCOUNTER — HEALTH MAINTENANCE LETTER (OUTPATIENT)
Age: 80
End: 2020-11-16

## 2020-11-16 DIAGNOSIS — G47.33 OSA (OBSTRUCTIVE SLEEP APNEA): Primary | ICD-10-CM

## 2020-11-16 NOTE — PROGRESS NOTES
Patient called today because she looked at her labs from 11/12/20 and her IGG level is 642.  She said she needs to cancel her IVIG infusion apt for 11/18/20 as her insurance won't cover IVIG when her IGG is >600.  Cancelled appointment per patient.  Tricia will talk with Dr. Boyd on 11/19/20 at her telephone visit appointment to make future plan for IVIG infusion. No further questions or concerns at this time.

## 2020-11-16 NOTE — PROGRESS NOTES
"  [facility]  Patient Name: Tricia Sosa  MRN: [id number]  : 1940  Date of Clinic Visit: 2020  Primary Care Provider: Emily Church  Referring Provider: No ref. provider found    Reason for appointment: CPAP follow-up    HISTORY OF PRESENT ILLNESS:  Tricia Sosa is a 80 year old female w/ PMH of common variable immunodeficiency syndrome, autoimmune hemolytic anemia with warm monotypic MARIKA positive to IgG and complement, Selective IgA deficiency, Leukopenia with markedly low CD4 counts (on Bactrim ppx), History of fall with subdural hematoma in 14 with complete resolution, Hashimoto's disease s/p partial thyroidectomy following up for CPAP check-in. Diagnostic AHI 6.5.   CPAP use started on 11/3. AHI at goal < 5, central apnea index < 1. Meeting objective compliance measures.   She's waking up more nocturnally now, she thinks maybe related to lying on her R side but she denies any clear association with waking up due to pain or discomfort so the association isn't clear. She denies waking up for breathing-related issues like snoring or choking or gasping for air, especially while using the CPAP. She's recently moved into a new bedroom with a new and unfamiliar mattress, and she's noted some new shoulder pain in her L arm near the tricep insertion point based on her description, onset after lying on the affected arm for a while, resolves after removing pressure. The pain isn't waking her up. She's had the same problem in her R arm for a long time but the L arm is new. She's been dreaming \"strange things\" since using the CPAP but these aren't concerning nor are they waking her up. Denies RLS or dream enactment.    [facility]  [employer]  Auto-PAP 5 - 15 cmH2O 30 day usage data:    83% of days with > 4 hours of use. 0/30 days with no use.  Average use 433 minutes per day.  Average leak 26.2 LPM.  Average % of night in large leak 0%.  CPAP 90% pressure 8.4cm.  AHI 2.28 events " per hour.    Residual AHI is: at goal.  Air hunger: None  Mask tolerance: Full face mask, tolerating well  Skin irritation: None  Daytime sleepiness/napping: Not routinely  Snoring, snort arousals, gasping while using CPAP: No  Bed partner issues while using CPAP: No sleep partners  Insufficient sleep, devoting <7 or more hours to sleep: getting ~7 hours of use/night.  Sleep onset/maintenance issues while using CPAP: Waking up    Sleep Scales  ESS: 5 (up from 4 in August 2020) - increased nocturnal awakenings, see DORCAS  DORCAS: 8 (down from 16 in August 2020) - around the time she started using CPAP she also moved into a different bedroom with a different mattress. She's waking up multiple times/night for unclear reasons: 1x/night for nocturia, otherwise no pain/discomfort or breathing-related issues that are actively waking her up..    ASSESSMENT AND PLAN:  Tricia Sosa is an 80F following up for CPAP evaluation.    # Sleep-maintenance difficulties: this might be related to her new sleep environment as her MARIE is objectively and subjectively better. She will return to her old bedroom and mattress tonight and after 4-6 weeks of using her CPAP in her prior sleep environment she will update us on her sleep problems. If she's still having sleep maintenance problems at that time we can pursue new insomnia workup.  # MARIE: AHI at goal, no tolerance issues.  - continue auto-PAP 5-15 cmH2O  - follow-up yearly for DME renewal, earlier if things change    The plan was formulated with Dr. Armando.    Thomas Jaquez MD  Sleep Medicine Fellow  [facility] [place]    Sleep Medicine Staff Telephone Note    I discussed, but did not personally evaluate, the patient with the fellow on 11-17.  I agree with the assessment and plan as it was reported to me.   The patient should not drive if tired or sleepy.    Gurjit Armando MD    PHYSICAL EXAMINATION:  Vitals: Wt 71.7 kg (158 lb)   LMP  (LMP Unknown)   BMI 21.43  kg/m    General: NAD  Pulm: non-labored during conversation  Neuro: normal speech, no language expression or comprehension difficulties    REVIEW OF SYSTEMS: 12-point RoS negative except as per HPI.    MEDICATIONS:  Current Outpatient Medications   Medication Sig Dispense Refill     cyanocobalamin (VITAMIN  B-12) 1000 MCG tablet   3     folic acid (FOLVITE) 1 MG tablet   3     hydrochlorothiazide (HYDRODIURIL) 12.5 MG tablet Take 1 tablet (12.5 mg) by mouth daily 30 tablet 1     hydrocortisone butyrate (LOCOID) 0.1 % SOLN solution Apply to scalp bid prn 60 mL 11     ketoconazole (NIZORAL) 2 % external shampoo Use every other day to scalp as needed 120 mL 11     levothyroxine (SYNTHROID/LEVOTHROID) 150 MCG tablet Take 1 tablet (150 mcg) by mouth daily 90 tablet 3     sulfamethoxazole-trimethoprim (BACTRIM DS/SEPTRA DS) 800-160 MG tablet Take 1 pill orally on Mon, Wed and Friday 45 tablet 3     triamcinolone (KENALOG) 0.1 % external cream        UNABLE TO FIND privigen inj monthly       PAST MEDICAL HISTORY:  Past Medical History:   Diagnosis Date     AWRD (dyspnea on exertion)      External hemorrhoids without mention of complication 6/27/05     Hemolytic anemia (H)     autoimmune     Hypothyroidism      IgA deficiency (H)      Other malaise and fatigue      Other severe protein-calorie malnutrition      Thyroid disease      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 The final document, although reviewed, may contain errors. For corrections, please contact the office.

## 2020-11-16 NOTE — PATIENT INSTRUCTIONS
1. You indicated that you're having more difficulty staying asleep throughout the night, despite using CPAP with the data showing much benefit from it. You mentioned that you've been in a new bedroom on a new mattress, and this might be negatively affecting your sleep. You can try going back to your old bedroom and prior mattress and trying sleeping on this for 4-6 weeks while continuing to use your CPAP machine. IF you're still having trouble sleeping through the night, please call the clinic to set up a follow up appointment to discuss evaluating you for sleep-maintenance difficulties.

## 2020-11-19 ENCOUNTER — VIRTUAL VISIT (OUTPATIENT)
Dept: ONCOLOGY | Facility: CLINIC | Age: 80
End: 2020-11-19
Attending: INTERNAL MEDICINE
Payer: MEDICARE

## 2020-11-19 DIAGNOSIS — E53.8 B12 DEFICIENCY: ICD-10-CM

## 2020-11-19 DIAGNOSIS — D83.9 CVID (COMMON VARIABLE IMMUNODEFICIENCY) (H): ICD-10-CM

## 2020-11-19 PROCEDURE — 99441 PR PHYSICIAN TELEPHONE EVALUATION 5-10 MIN: CPT | Mod: 95 | Performed by: INTERNAL MEDICINE

## 2020-11-19 PROCEDURE — 999N001193 HC VIDEO/TELEPHONE VISIT; NO CHARGE

## 2020-11-19 RX ORDER — SULFAMETHOXAZOLE/TRIMETHOPRIM 800-160 MG
TABLET ORAL
Qty: 45 TABLET | Refills: 3 | Status: SHIPPED | OUTPATIENT
Start: 2020-11-19 | End: 2021-11-16

## 2020-11-19 NOTE — PROGRESS NOTES
"Tricia Sosa is a 80 year old female who is being evaluated via a billable telephone visit.      The patient has been notified of following:     \"This telephone visit will be conducted via a call between you and your physician/provider. We have found that certain health care needs can be provided without the need for a physical exam.  This service lets us provide the care you need with a short phone conversation.  If a prescription is necessary we can send it directly to your pharmacy.  If lab work is needed we can place an order for that and you can then stop by our lab to have the test done at a later time.    Telephone visits are billed at different rates depending on your insurance coverage. During this emergency period, for some insurers they may be billed the same as an in-person visit.  Please reach out to your insurance provider with any questions.    If during the course of the call the physician/provider feels a telephone visit is not appropriate, you will not be charged for this service.\"    Patient has given verbal consent for Telephone visit?  Yes    What phone number would you like to be contacted at? [phone]    How would you like to obtain your AVS? D[facility] Corrigan [facility]    Hematology/Oncology Established Patient Follow-up Note      Today's Date: 11/19/20    Reason for Follow-up: Hemolytic anemia-autoimmune; IgA deficiency    HISTORY OF PRESENT ILLNESS: Tricia Sosa is a 80 year old female who presents with autoimmune hemolytic anemia and IgA deficiency.  She previously saw Dr. Matos and then Dr. Summers.  She was previously seen at [facility].    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  She was very sick at the time and had severe diarrhea.  She lost about 40 pounds in weight.  The workup revealed that she had markedly diminished CD4 counts of 48 and was diagnosed with CMV colitis.  Since then she " has been followed in hematology clinic at [place] until recently.  She was noted to have anemia which was fairly long-standing.  She was initially referred for anemia in 2004 and also had a bone marrow biopsy done at that time which revealed hypercellular bone marrow with 70-80% cellularity and normal trilineage hematopoiesis and no morphologic features of MDS or lymphoproliferation.  Her anemia was attributed to her chronic underlying disease.  At the next evaluation in 2002 on 4 aggressive anemia there was no evidence of hemolysis, iron deficiency, B12 or folate deficiency.  Bone marrow biopsy was not pursued.  In summer of 2015 she had progressive fatigue, dyspnea on exertion and worsening anemia with a hemoglobin now of 9.  Workup at this time did suggest a hemolytic anemia with elevated LDH at 709, undetectable haptoglobin and elevated unconjugated bilirubin at 3.3.  Monospecific MARIKA was positive for both IgG and complement.  Cold agglutinin screen was positive with very low titer 1:64.  She was started on prednisone 60 mg daily with supplementation of iron folate and B12 starting 7/31/15.  Her prednisone has been slowly tapered and was discontinued off in January 2016.  She was last followed at [facility] on March 10, 2016 when she had stable hemoglobin.    She was followed by Dr. Matos and Dr. Summers.  Due to relapse of hemolytic anemia, she underwent another course of prednisone that has since been tapered off and rituximab x 4 weekly doses completed in 9/19/19.    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  She started weekly Rituxan on 7/31/20 x 4 doses, completed on 8/21/20.        INTERIM HISTORY: Tricia says that she is feeling very well.  She tapered off of prednisone as of early October 2020.      REVIEW OF SYSTEMS:   14 point ROS was reviewed and is negative other than as noted above in HPI.       HOME MEDICATIONS:  Current Outpatient  Medications   Medication Sig Dispense Refill     cyanocobalamin (VITAMIN  B-12) 1000 MCG tablet   3     folic acid (FOLVITE) 1 MG tablet   3     hydrochlorothiazide (HYDRODIURIL) 12.5 MG tablet Take 1 tablet (12.5 mg) by mouth daily 30 tablet 1     hydrocortisone butyrate (LOCOID) 0.1 % SOLN solution Apply to scalp bid prn 60 mL 11     ketoconazole (NIZORAL) 2 % external shampoo Use every other day to scalp as needed 120 mL 11     levothyroxine (SYNTHROID/LEVOTHROID) 150 MCG tablet Take 1 tablet (150 mcg) by mouth daily 90 tablet 3     sulfamethoxazole-trimethoprim (BACTRIM DS) 800-160 MG tablet Take 1 pill orally on Mon, Wed and Friday 45 tablet 3     triamcinolone (KENALOG) 0.1 % external cream        UNABLE TO FIND privigen inj monthly           ALLERGIES:  Allergies   Allergen Reactions     Doxycycline          PAST MEDICAL HISTORY:  Past Medical History:   Diagnosis Date     WARD (dyspnea on exertion)      External hemorrhoids without mention of complication 6/27/05     Hemolytic anemia (H)     autoimmune     Hypothyroidism      IgA deficiency (H)      Other malaise and fatigue      Other severe protein-calorie malnutrition      Thyroid disease      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, and posterior vitreous detachment     Urinary tract infection, site not specified     Recurrent UTI's         PAST SURGICAL HISTORY:  Past Surgical History:   Procedure Laterality Date     C LIGATE FALLOPIAN TUBE       COLONOSCOPY N/A 4/26/2016    Procedure: COLONOSCOPY;  Surgeon: Dontae Del Rio MD;  Location:  GI      COLONOSCOPY W BIOPSY  4/26/00      COLONOSCOPY W BIOPSY  10/8/03      COLONOSCOPY W BIOPSY  6/27/05    REPEAT IN 5 YEARS      CYSTOSCOPY,INSERT URETERAL STENT      Ureteral stent insertion      FLEX SIGMOIDOSCOPY W BIOPSY  5/30/00      LAPAROSCOPY, SURGICAL; CHOLECYSTECTOMY  1992      THYROIDECTOMY       LIGATION OF HEMORRHOID(S)      Hemorrhoidectomy      UPPER GI ENDOSCOPY,BIOPSY  10/01/01         SOCIAL HISTORY:  Social History     Socioeconomic History     Marital status:      Spouse name: Not on file     Number of children: Not on file     Years of education: Not on file     Highest education level: Not on file   Occupational History     Not on file   Social Needs     Financial resource strain: Not on file     Food insecurity     Worry: Not on file     Inability: Not on file     Transportation needs     Medical: Not on file     Non-medical: Not on file   Tobacco Use     Smoking status: Never Smoker     Smokeless tobacco: Never Used   Substance and Sexual Activity     Alcohol use: No     Alcohol/week: 0.0 standard drinks     Frequency: Never     Drug use: No     Sexual activity: Never   Lifestyle     Physical activity     Days per week: Not on file     Minutes per session: Not on file     Stress: Not on file   Relationships     Social connections     Talks on phone: Not on file     Gets together: Not on file     Attends [employer] service: Not on file     Active member of club or organization: Not on file     Attends meetings of clubs or organizations: Not on file     Relationship status: Not on file     Intimate partner violence     Fear of current or ex partner: Not on file     Emotionally abused: Not on file     Physically abused: Not on file     Forced sexual activity: Not on file   Other Topics Concern     Parent/sibling w/ CABG, MI or angioplasty before 65F 55M? Not Asked   Social History Narrative     Not on file         FAMILY HISTORY:  Family History   Problem Relation Age of Onset     Colon Cancer Mother 90     Cerebrovascular Disease Father          at age 85     Dementia Brother      Dementia Brother      Pancreatic Cancer Brother      Breast Cancer Sister          PHYSICAL EXAM:  Phone visit.      LABS:  CBC RESULTS:   Recent Labs   Lab Test 20  1012   WBC 5.4   RBC 3.85   HGB 9.9*   HCT 32.4*   MCV 84   MCH 25.7*   MCHC 30.6*   RDW  15.4*            ASSESSMENT/PLAN:  Tricia Sosa is a 79 year old female with:      1. 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 Common variable immunodeficiency disorder  4. Splenomegaly        1) Autoimmune hemolytic anemia: She recently had relapse in July 2020, and started on prednisone, as well as weekly rituximab infusions x 4, completed on 8/21/20.  Her hemoglobin has slowly improved back up to her baseline and has been stable.    We also previously talked about other treatment options if her disease becomes refractory to steroids, such as splenectomy.      -she has tapered off of prednisone as of early October 2020  -hemoglobin has remained stable  -she will now resume monthly lab checks  -follow-up with me in 3 months    2) CVID:   -continue IVIG.  She gets it monthly, if IgG is <600      Sabina Boyd MD  Hematology/Oncology  [facility]      Phone call duration: 5 minutes

## 2020-11-19 NOTE — LETTER
"    11/19/2020         RE: Tricia Sosa  [id number]  [place] [address]        Dear Colleague,    Thank you for referring your patient, Tricia Sosa, to the [facility]. Please see a copy of my visit note below.    Tricia Sosa is a 80 year old female who is being evaluated via a billable telephone visit.      The patient has been notified of following:     \"This telephone visit will be conducted via a call between you and your physician/provider. We have found that certain health care needs can be provided without the need for a physical exam.  This service lets us provide the care you need with a short phone conversation.  If a prescription is necessary we can send it directly to your pharmacy.  If lab work is needed we can place an order for that and you can then stop by our lab to have the test done at a later time.    Telephone visits are billed at different rates depending on your insurance coverage. During this emergency period, for some insurers they may be billed the same as an in-person visit.  Please reach out to your insurance provider with any questions.    If during the course of the call the physician/provider feels a telephone visit is not appropriate, you will not be charged for this service.\"    Patient has given verbal consent for Telephone visit?  Yes    What phone number would you like to be contacted at? [phone]    How would you like to obtain your AVS? Dax Corrigan [facility]    Hematology/Oncology Established Patient Follow-up Note      Today's Date: 11/19/20    Reason for Follow-up: Hemolytic anemia-autoimmune; IgA deficiency    HISTORY OF PRESENT ILLNESS: Tricia Sosa is a 80 year old female who presents with autoimmune hemolytic anemia and IgA deficiency.  She previously saw Dr. Matos and then Dr. Summers.  She was previously seen at [facility].    TREATMENT SUMMARY:  Tricia has been " diagnosed with IgA deficiency since her around 2000.  She was very sick at the time and had severe diarrhea.  She lost about 40 pounds in weight.  The workup revealed that she had markedly diminished CD4 counts of 48 and was diagnosed with CMV colitis.  Since then she has been followed in hematology clinic at [place] until recently.  She was noted to have anemia which was fairly long-standing.  She was initially referred for anemia in 2004 and also had a bone marrow biopsy done at that time which revealed hypercellular bone marrow with 70-80% cellularity and normal trilineage hematopoiesis and no morphologic features of MDS or lymphoproliferation.  Her anemia was attributed to her chronic underlying disease.  At the next evaluation in 2002 on 4 aggressive anemia there was no evidence of hemolysis, iron deficiency, B12 or folate deficiency.  Bone marrow biopsy was not pursued.  In summer of 2015 she had progressive fatigue, dyspnea on exertion and worsening anemia with a hemoglobin now of 9.  Workup at this time did suggest a hemolytic anemia with elevated LDH at 709, undetectable haptoglobin and elevated unconjugated bilirubin at 3.3.  Monospecific MARIKA was positive for both IgG and complement.  Cold agglutinin screen was positive with very low titer 1:64.  She was started on prednisone 60 mg daily with supplementation of iron folate and B12 starting 7/31/15.  Her prednisone has been slowly tapered and was discontinued off in January 2016.  She was last followed at [facility] on March 10, 2016 when she had stable hemoglobin.    She was followed by Dr. Matos and Dr. Summers.  Due to relapse of hemolytic anemia, she underwent another course of prednisone that has since been tapered off and rituximab x 4 weekly doses completed in 9/19/19.    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  She started weekly Rituxan on 7/31/20 x 4 doses, completed on  8/21/20.        INTERIM HISTORY: Tricia says that she is feeling very well.  She tapered off of prednisone as of early October 2020.      REVIEW OF SYSTEMS:   14 point ROS was reviewed and is negative other than as noted above in HPI.       HOME MEDICATIONS:  Current Outpatient Medications   Medication Sig Dispense Refill     cyanocobalamin (VITAMIN  B-12) 1000 MCG tablet   3     folic acid (FOLVITE) 1 MG tablet   3     hydrochlorothiazide (HYDRODIURIL) 12.5 MG tablet Take 1 tablet (12.5 mg) by mouth daily 30 tablet 1     hydrocortisone butyrate (LOCOID) 0.1 % SOLN solution Apply to scalp bid prn 60 mL 11     ketoconazole (NIZORAL) 2 % external shampoo Use every other day to scalp as needed 120 mL 11     levothyroxine (SYNTHROID/LEVOTHROID) 150 MCG tablet Take 1 tablet (150 mcg) by mouth daily 90 tablet 3     sulfamethoxazole-trimethoprim (BACTRIM DS) 800-160 MG tablet Take 1 pill orally on Mon, Wed and Friday 45 tablet 3     triamcinolone (KENALOG) 0.1 % external cream        UNABLE TO FIND privigen inj monthly           ALLERGIES:  Allergies   Allergen Reactions     Doxycycline          PAST MEDICAL HISTORY:  Past Medical History:   Diagnosis Date     WARD (dyspnea on exertion)      External hemorrhoids without mention of complication 6/27/05     Hemolytic anemia (H)     autoimmune     Hypothyroidism      IgA deficiency (H)      Other malaise and fatigue      Other severe protein-calorie malnutrition      Thyroid disease      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, and posterior vitreous detachment     Urinary tract infection, site not specified     Recurrent UTI's         PAST SURGICAL HISTORY:  Past Surgical History:   Procedure Laterality Date     C LIGATE FALLOPIAN TUBE       COLONOSCOPY N/A 4/26/2016    Procedure: COLONOSCOPY;  Surgeon: Dontae Del Rio MD;  Location:  GI     HC COLONOSCOPY W BIOPSY  4/26/00     HC COLONOSCOPY W BIOPSY  10/8/03     HC COLONOSCOPY W BIOPSY   05    REPEAT IN 5 YEARS     HC CYSTOSCOPY,INSERT URETERAL STENT      Ureteral stent insertion     HC FLEX SIGMOIDOSCOPY W BIOPSY  00     HC LAPAROSCOPY, SURGICAL; CHOLECYSTECTOMY        THYROIDECTOMY       LIGATION OF HEMORRHOID(S)      Hemorrhoidectomy     UPPER GI ENDOSCOPY,BIOPSY  10/01/01         SOCIAL HISTORY:  Social History     Socioeconomic History     Marital status:      Spouse name: Not on file     Number of children: Not on file     Years of education: Not on file     Highest education level: Not on file   Occupational History     Not on file   Social Needs     Financial resource strain: Not on file     Food insecurity     Worry: Not on file     Inability: Not on file     Transportation needs     Medical: Not on file     Non-medical: Not on file   Tobacco Use     Smoking status: Never Smoker     Smokeless tobacco: Never Used   Substance and Sexual Activity     Alcohol use: No     Alcohol/week: 0.0 standard drinks     Frequency: Never     Drug use: No     Sexual activity: Never   Lifestyle     Physical activity     Days per week: Not on file     Minutes per session: Not on file     Stress: Not on file   Relationships     Social connections     Talks on phone: Not on file     Gets together: Not on file     Attends Yars[employer] service: Not on file     Active member of club or organization: Not on file     Attends meetings of clubs or organizations: Not on file     Relationship status: Not on file     Intimate partner violence     Fear of current or ex partner: Not on file     Emotionally abused: Not on file     Physically abused: Not on file     Forced sexual activity: Not on file   Other Topics Concern     Parent/sibling w/ CABG, MI or angioplasty before 65F 55M? Not Asked   Social History Narrative     Not on file         FAMILY HISTORY:  Family History   Problem Relation Age of Onset     Colon Cancer Mother 90     Cerebrovascular Disease Father          at age 85     Dementia  Brother      Dementia Brother      Pancreatic Cancer Brother      Breast Cancer Sister          PHYSICAL EXAM:  Phone visit.      LABS:  CBC RESULTS:   Recent Labs   Lab Test 11/12/20  1012   WBC 5.4   RBC 3.85   HGB 9.9*   HCT 32.4*   MCV 84   MCH 25.7*   MCHC 30.6*   RDW 15.4*            ASSESSMENT/PLAN:  Tricia Sosa is a 79 year old female with:      1. 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 Common variable immunodeficiency disorder  4. Splenomegaly        1) Autoimmune hemolytic anemia: She recently had relapse in July 2020, and started on prednisone, as well as weekly rituximab infusions x 4, completed on 8/21/20.  Her hemoglobin has slowly improved back up to her baseline and has been stable.    We also previously talked about other treatment options if her disease becomes refractory to steroids, such as splenectomy.      -she has tapered off of prednisone as of early October 2020  -hemoglobin has remained stable  -she will now resume monthly lab checks  -follow-up with me in 3 months    2) CVID:   -continue IVIG.  She gets it monthly, if IgG is <600      Sabina Boyd MD  Hematology/Oncology  [facility]      Phone call duration: 5 minutes        Again, thank you for allowing me to participate in the care of your patient.        Sincerely,        Sabina Boyd MD

## 2020-11-19 NOTE — LETTER
"    11/19/2020         RE: Tricia Sosa  [id number]  [facility] [id number]        Dear Colleague,    Thank you for referring your patient, Tricia Sosa, to the [facility]. Please see a copy of my visit note below.    Tricia Sosa is a 80 year old female who is being evaluated via a billable telephone visit.      The patient has been notified of following:     \"This telephone visit will be conducted via a call between you and your physician/provider. We have found that certain health care needs can be provided without the need for a physical exam.  This service lets us provide the care you need with a short phone conversation.  If a prescription is necessary we can send it directly to your pharmacy.  If lab work is needed we can place an order for that and you can then stop by our lab to have the test done at a later time.    Telephone visits are billed at different rates depending on your insurance coverage. During this emergency period, for some insurers they may be billed the same as an in-person visit.  Please reach out to your insurance provider with any questions.    If during the course of the call the physician/provider feels a telephone visit is not appropriate, you will not be charged for this service.\"    Patient has given verbal consent for Telephone visit?  Yes    What phone number would you like to be contacted at? [phone]    How would you like to obtain your AVS? [facility]    Hematology/Oncology Established Patient Follow-up Note      Today's Date: 11/19/20    Reason for Follow-up: Hemolytic anemia-autoimmune; IgA deficiency    HISTORY OF PRESENT ILLNESS: Tricia Sosa is a 80 year old female who presents with autoimmune hemolytic anemia and IgA deficiency.  She previously saw Dr. Matos and then Dr. Summers.  She was previously seen at [facility].    TREATMENT SUMMARY:  Tricia has been " diagnosed with IgA deficiency since her around 2000.  She was very sick at the time and had severe diarrhea.  She lost about 40 pounds in weight.  The workup revealed that she had markedly diminished CD4 counts of 48 and was diagnosed with CMV colitis.  Since then she has been followed in hematology clinic at [place] until recently.  She was noted to have anemia which was fairly long-standing.  She was initially referred for anemia in 2004 and also had a bone marrow biopsy done at that time which revealed hypercellular bone marrow with 70-80% cellularity and normal trilineage hematopoiesis and no morphologic features of MDS or lymphoproliferation.  Her anemia was attributed to her chronic underlying disease.  At the next evaluation in 2002 on 4 aggressive anemia there was no evidence of hemolysis, iron deficiency, B12 or folate deficiency.  Bone marrow biopsy was not pursued.  In summer of 2015 she had progressive fatigue, dyspnea on exertion and worsening anemia with a hemoglobin now of 9.  Workup at this time did suggest a hemolytic anemia with elevated LDH at 709, undetectable haptoglobin and elevated unconjugated bilirubin at 3.3.  Monospecific MARIKA was positive for both IgG and complement.  Cold agglutinin screen was positive with very low titer 1:64.  She was started on prednisone 60 mg daily with supplementation of iron folate and B12 starting 7/31/15.  Her prednisone has been slowly tapered and was discontinued off in January 2016.  She was last followed at [facility] on March 10, 2016 when she had stable hemoglobin.    She was followed by Dr. Matos and Dr. Summers.  Due to relapse of hemolytic anemia, she underwent another course of prednisone that has since been tapered off and rituximab x 4 weekly doses completed in 9/19/19.    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  She started weekly Rituxan on 7/31/20 x 4 doses, completed on  8/21/20.        INTERIM HISTORY: Tricia says that she is feeling very well.  She tapered off of prednisone as of early October 2020.      REVIEW OF SYSTEMS:   14 point ROS was reviewed and is negative other than as noted above in HPI.       HOME MEDICATIONS:  Current Outpatient Medications   Medication Sig Dispense Refill     cyanocobalamin (VITAMIN  B-12) 1000 MCG tablet   3     folic acid (FOLVITE) 1 MG tablet   3     hydrochlorothiazide (HYDRODIURIL) 12.5 MG tablet Take 1 tablet (12.5 mg) by mouth daily 30 tablet 1     hydrocortisone butyrate (LOCOID) 0.1 % SOLN solution Apply to scalp bid prn 60 mL 11     ketoconazole (NIZORAL) 2 % external shampoo Use every other day to scalp as needed 120 mL 11     levothyroxine (SYNTHROID/LEVOTHROID) 150 MCG tablet Take 1 tablet (150 mcg) by mouth daily 90 tablet 3     sulfamethoxazole-trimethoprim (BACTRIM DS) 800-160 MG tablet Take 1 pill orally on Mon, Wed and Friday 45 tablet 3     triamcinolone (KENALOG) 0.1 % external cream        UNABLE TO FIND privigen inj monthly           ALLERGIES:  Allergies   Allergen Reactions     Doxycycline          PAST MEDICAL HISTORY:  Past Medical History:   Diagnosis Date     WARD (dyspnea on exertion)      External hemorrhoids without mention of complication 6/27/05     Hemolytic anemia (H)     autoimmune     Hypothyroidism      IgA deficiency (H)      Other malaise and fatigue      Other severe protein-calorie malnutrition      Thyroid disease      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, and posterior vitreous detachment     Urinary tract infection, site not specified     Recurrent UTI's         PAST SURGICAL HISTORY:  Past Surgical History:   Procedure Laterality Date     C LIGATE FALLOPIAN TUBE       COLONOSCOPY N/A 4/26/2016    Procedure: COLONOSCOPY;  Surgeon: Dontae Del Rio MD;  Location:  GI     HC COLONOSCOPY W BIOPSY  4/26/00     HC COLONOSCOPY W BIOPSY  10/8/03     HC COLONOSCOPY W BIOPSY   05    REPEAT IN 5 YEARS     HC CYSTOSCOPY,INSERT URETERAL STENT      Ureteral stent insertion     HC FLEX SIGMOIDOSCOPY W BIOPSY  00     HC LAPAROSCOPY, SURGICAL; CHOLECYSTECTOMY        THYROIDECTOMY       LIGATION OF HEMORRHOID(S)      Hemorrhoidectomy     UPPER GI ENDOSCOPY,BIOPSY  10/01/01         SOCIAL HISTORY:  Social History     Socioeconomic History     Marital status:      Spouse name: Not on file     Number of children: Not on file     Years of education: Not on file     Highest education level: Not on file   Occupational History     Not on file   Social Needs     Financial resource strain: Not on file     Food insecurity     Worry: Not on file     Inability: Not on file     Transportation needs     Medical: Not on file     Non-medical: Not on file   Tobacco Use     Smoking status: Never Smoker     Smokeless tobacco: Never Used   Substance and Sexual Activity     Alcohol use: No     Alcohol/week: 0.0 standard drinks     Frequency: Never     Drug use: No     Sexual activity: Never   Lifestyle     Physical activity     Days per week: Not on file     Minutes per session: Not on file     Stress: Not on file   Relationships     Social connections     Talks on phone: Not on file     Gets together: Not on file     Attends [employer] service: Not on file     Active member of club or organization: Not on file     Attends meetings of clubs or organizations: Not on file     Relationship status: Not on file     Intimate partner violence     Fear of current or ex partner: Not on file     Emotionally abused: Not on file     Physically abused: Not on file     Forced sexual activity: Not on file   Other Topics Concern     Parent/sibling w/ CABG, MI or angioplasty before 65F 55M? Not Asked   Social History Narrative     Not on file         FAMILY HISTORY:  Family History   Problem Relation Age of Onset     Colon Cancer Mother 90     Cerebrovascular Disease Father          at age 85     Dementia  Brother      Dementia Brother      Pancreatic Cancer Brother      Breast Cancer Sister          PHYSICAL EXAM:  Phone visit.      LABS:  CBC RESULTS:   Recent Labs   Lab Test 11/12/20  1012   WBC 5.4   RBC 3.85   HGB 9.9*   HCT 32.4*   MCV 84   MCH 25.7*   MCHC 30.6*   RDW 15.4*            ASSESSMENT/PLAN:  Tricia Sosa is a 79 year old female with:      1. 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 Common variable immunodeficiency disorder  4. Splenomegaly        1) Autoimmune hemolytic anemia: She recently had relapse in July 2020, and started on prednisone, as well as weekly rituximab infusions x 4, completed on 8/21/20.  Her hemoglobin has slowly improved back up to her baseline and has been stable.    We also previously talked about other treatment options if her disease becomes refractory to steroids, such as splenectomy.      -she has tapered off of prednisone as of early October 2020  -hemoglobin has remained stable  -she will now resume monthly lab checks  -follow-up with me in 3 months    2) CVID:   -continue IVIG.  She gets it monthly, if IgG is <600      Sabina Boyd MD  Hematology/Oncology  [facility]      Phone call duration: 5 minutes        Again, thank you for allowing me to participate in the care of your patient.        Sincerely,        Sabina Boyd MD

## 2020-11-25 NOTE — PATIENT INSTRUCTIONS
12/16/20, 1/13/21 and 2/10/21: Labs + Q month IVIG.  2/18/21: Appointment with Dr. Boyd (Telephone Visit).  Fadumo Caldera RN, BSN, OCN on 11/25/2020 at 1:13 PM

## 2020-12-10 ENCOUNTER — HOSPITAL ENCOUNTER (OUTPATIENT)
Facility: CLINIC | Age: 80
Setting detail: SPECIMEN
Discharge: HOME | End: 2020-12-10
Attending: INTERNAL MEDICINE | Admitting: INTERNAL MEDICINE
Payer: MEDICARE

## 2020-12-10 DIAGNOSIS — D59.19 OTHER AUTOIMMUNE HEMOLYTIC ANEMIA (H): ICD-10-CM

## 2020-12-10 LAB
ALBUMIN SERPL-MCNC: 3.9 G/DL (ref 3.4–5)
ALP SERPL-CCNC: 125 U/L (ref 40–150)
ALT SERPL W P-5'-P-CCNC: 21 U/L (ref 0–50)
ANION GAP SERPL CALCULATED.3IONS-SCNC: 5 MMOL/L (ref 3–14)
AST SERPL W P-5'-P-CCNC: 30 U/L (ref 0–45)
BASOPHILS # BLD AUTO: 0 10E9/L (ref 0–0.2)
BASOPHILS NFR BLD AUTO: 0.2 %
BILIRUB SERPL-MCNC: 0.8 MG/DL (ref 0.2–1.3)
BUN SERPL-MCNC: 15 MG/DL (ref 7–30)
CALCIUM SERPL-MCNC: 8.8 MG/DL (ref 8.5–10.1)
CHLORIDE SERPL-SCNC: 105 MMOL/L (ref 94–109)
CO2 SERPL-SCNC: 30 MMOL/L (ref 20–32)
CREAT SERPL-MCNC: 0.61 MG/DL (ref 0.52–1.04)
DIFFERENTIAL METHOD BLD: ABNORMAL
EOSINOPHIL # BLD AUTO: 0 10E9/L (ref 0–0.7)
EOSINOPHIL NFR BLD AUTO: 0.8 %
ERYTHROCYTE [DISTWIDTH] IN BLOOD BY AUTOMATED COUNT: 15.9 % (ref 10–15)
GFR SERPL CREATININE-BSD FRML MDRD: 86 ML/MIN/{1.73_M2}
GLUCOSE SERPL-MCNC: 77 MG/DL (ref 70–99)
HCT VFR BLD AUTO: 35.7 % (ref 35–47)
HGB BLD-MCNC: 10.9 G/DL (ref 11.7–15.7)
IMM GRANULOCYTES # BLD: 0 10E9/L (ref 0–0.4)
IMM GRANULOCYTES NFR BLD: 0.4 %
LDH SERPL L TO P-CCNC: 275 U/L (ref 81–234)
LYMPHOCYTES # BLD AUTO: 1.6 10E9/L (ref 0.8–5.3)
LYMPHOCYTES NFR BLD AUTO: 29.9 %
MCH RBC QN AUTO: 27.3 PG (ref 26.5–33)
MCHC RBC AUTO-ENTMCNC: 30.5 G/DL (ref 31.5–36.5)
MCV RBC AUTO: 89 FL (ref 78–100)
MONOCYTES # BLD AUTO: 0.7 10E9/L (ref 0–1.3)
MONOCYTES NFR BLD AUTO: 13.9 %
NEUTROPHILS # BLD AUTO: 2.8 10E9/L (ref 1.6–8.3)
NEUTROPHILS NFR BLD AUTO: 54.8 %
NRBC # BLD AUTO: 0 10*3/UL
NRBC BLD AUTO-RTO: 0 /100
PLATELET # BLD AUTO: 203 10E9/L (ref 150–450)
POTASSIUM SERPL-SCNC: 4.1 MMOL/L (ref 3.4–5.3)
PROT SERPL-MCNC: 6.4 G/DL (ref 6.8–8.8)
RBC # BLD AUTO: 4 10E12/L (ref 3.8–5.2)
RETICS # AUTO: 90.4 10E9/L (ref 25–95)
RETICS/RBC NFR AUTO: 2.3 % (ref 0.5–2)
SODIUM SERPL-SCNC: 140 MMOL/L (ref 133–144)
WBC # BLD AUTO: 5.2 10E9/L (ref 4–11)

## 2020-12-10 PROCEDURE — 85025 COMPLETE CBC W/AUTO DIFF WBC: CPT | Performed by: INTERNAL MEDICINE

## 2020-12-10 PROCEDURE — 36415 COLL VENOUS BLD VENIPUNCTURE: CPT

## 2020-12-10 PROCEDURE — 83010 ASSAY OF HAPTOGLOBIN QUANT: CPT | Performed by: INTERNAL MEDICINE

## 2020-12-10 PROCEDURE — 83615 LACTATE (LD) (LDH) ENZYME: CPT | Performed by: INTERNAL MEDICINE

## 2020-12-10 PROCEDURE — 80053 COMPREHEN METABOLIC PANEL: CPT | Performed by: INTERNAL MEDICINE

## 2020-12-10 PROCEDURE — 82784 ASSAY IGA/IGD/IGG/IGM EACH: CPT | Performed by: INTERNAL MEDICINE

## 2020-12-10 PROCEDURE — 85045 AUTOMATED RETICULOCYTE COUNT: CPT | Performed by: INTERNAL MEDICINE

## 2020-12-11 LAB
HAPTOGLOB SERPL-MCNC: <3 MG/DL (ref 32–197)
IGA SERPL-MCNC: <2 MG/DL (ref 84–499)
IGG SERPL-MCNC: 475 MG/DL (ref 610–1616)
IGM SERPL-MCNC: 14 MG/DL (ref 35–242)

## 2020-12-16 ENCOUNTER — INFUSION THERAPY VISIT (OUTPATIENT)
Dept: INFUSION THERAPY | Facility: CLINIC | Age: 80
End: 2020-12-16
Attending: INTERNAL MEDICINE
Payer: MEDICARE

## 2020-12-16 VITALS
RESPIRATION RATE: 16 BRPM | SYSTOLIC BLOOD PRESSURE: 132 MMHG | WEIGHT: 155.5 LBS | BODY MASS INDEX: 21.09 KG/M2 | DIASTOLIC BLOOD PRESSURE: 65 MMHG | HEART RATE: 77 BPM | OXYGEN SATURATION: 96 % | TEMPERATURE: 98.2 F

## 2020-12-16 DIAGNOSIS — D83.9 CVID (COMMON VARIABLE IMMUNODEFICIENCY) (H): Primary | ICD-10-CM

## 2020-12-16 PROCEDURE — 250N000011 HC RX IP 250 OP 636: Performed by: INTERNAL MEDICINE

## 2020-12-16 PROCEDURE — 96366 THER/PROPH/DIAG IV INF ADDON: CPT

## 2020-12-16 PROCEDURE — 96375 TX/PRO/DX INJ NEW DRUG ADDON: CPT

## 2020-12-16 PROCEDURE — 96365 THER/PROPH/DIAG IV INF INIT: CPT

## 2020-12-16 RX ADMIN — HUMAN IMMUNOGLOBULIN G 35 G: 20 LIQUID INTRAVENOUS at 10:41

## 2020-12-16 RX ADMIN — HYDROCORTISONE SODIUM SUCCINATE 100 MG: 100 INJECTION, POWDER, FOR SOLUTION INTRAMUSCULAR; INTRAVENOUS at 10:37

## 2020-12-16 NOTE — PROGRESS NOTES
Infusion Nursing Note:  Tricia Goncalvesber presents today for IVIG.    Patient seen by provider today: No   present during visit today: Not Applicable.    Note: Patient declined tylenol and benadryl for premeds, solu-cortef given.  Started infusion at 0.5ml/kg/hr and increased by 1ml/kg/hr, max rate 4ml/kg/hr.  Labs not needed today, done 12/10.    Intravenous Access:  Peripheral IV placed.    Treatment Conditions:        Biological Infusion Checklist:  ~~~ NOTE: If the patient answers yes to any of the questions below, hold the infusion and contact ordering provider or on-call provider.    1. Have you recently had an elevated temperature, fever, chills, productive cough, coughing for 3 weeks or longer or hemoptysis, abnormal vital signs, night sweats,  chest pain or have you noticed a decrease in your appetite, unexplained weight loss or fatigue? No  2. Do you have any open wounds or new incisions? No  3. Do you have any recent or upcoming hospitalizations, surgeries or dental procedures? No  4. Do you currently have or recently have had any signs of illness or infection or are you on any antibiotics? No takes bactrim long term prophylactically  5. Have you had any new, sudden or worsening abdominal pain? No  6. Have you or anyone in your household received a live vaccination in the past 4 weeks? Please note:  No live vaccines while on biologic/chemotherapy until 6 months after the last treatment.  Patient can receive the flu vaccine (shot only) and the pneumovax.  It is optimal for the patient to get these vaccines mid cycle, but they can be given at any time as long as it is not on the day of the infusion. No  7. Have you recently been diagnosed with any new nervous system diseases (ie. Multiple sclerosis, Guillain [place], seizures, neurological changes) or cancer diagnosis? No  8. Are you on any form of radiation or chemotherapy? No  9. Are you pregnant or breast feeding or do you have plans of  pregnancy in the future? No  10. Have you been having any signs of worsening depression or suicidal ideations?  (benlysta only) No  11. Have there been any other new onset medical symptoms? No        Post Infusion Assessment:  Patient tolerated infusion without incident.  Blood return noted pre and post infusion.  Site patent and intact, free from redness, edema or discomfort.  No evidence of extravasations.  Access discontinued per protocol.       Discharge Plan:   Copy of AVS reviewed with patient and/or family.  Patient will return 1/13/2021 for next appointment.  Patient discharged in stable condition accompanied by: self.  Departure Mode: Ambulatory.    Ally Adams RN

## 2021-01-04 DIAGNOSIS — R60.0 BILATERAL LEG EDEMA: ICD-10-CM

## 2021-01-06 RX ORDER — HYDROCHLOROTHIAZIDE 12.5 MG/1
TABLET ORAL
Qty: 30 TABLET | Refills: 0 | Status: SHIPPED | OUTPATIENT
Start: 2021-01-06 | End: 2021-06-22

## 2021-01-07 ENCOUNTER — HOSPITAL ENCOUNTER (OUTPATIENT)
Facility: CLINIC | Age: 81
Setting detail: SPECIMEN
Discharge: HOME | End: 2021-01-07
Attending: INTERNAL MEDICINE | Admitting: INTERNAL MEDICINE
Payer: MEDICARE

## 2021-01-07 DIAGNOSIS — D59.19 OTHER AUTOIMMUNE HEMOLYTIC ANEMIA (H): ICD-10-CM

## 2021-01-07 LAB
ALBUMIN SERPL-MCNC: 3.7 G/DL (ref 3.4–5)
ALP SERPL-CCNC: 115 U/L (ref 40–150)
ALT SERPL W P-5'-P-CCNC: 21 U/L (ref 0–50)
ANION GAP SERPL CALCULATED.3IONS-SCNC: 4 MMOL/L (ref 3–14)
AST SERPL W P-5'-P-CCNC: 29 U/L (ref 0–45)
BASOPHILS # BLD AUTO: 0 10E9/L (ref 0–0.2)
BASOPHILS NFR BLD AUTO: 0.4 %
BILIRUB SERPL-MCNC: 0.7 MG/DL (ref 0.2–1.3)
BUN SERPL-MCNC: 13 MG/DL (ref 7–30)
CALCIUM SERPL-MCNC: 8.5 MG/DL (ref 8.5–10.1)
CHLORIDE SERPL-SCNC: 108 MMOL/L (ref 94–109)
CO2 SERPL-SCNC: 27 MMOL/L (ref 20–32)
CREAT SERPL-MCNC: 0.7 MG/DL (ref 0.52–1.04)
DIFFERENTIAL METHOD BLD: ABNORMAL
EOSINOPHIL # BLD AUTO: 0 10E9/L (ref 0–0.7)
EOSINOPHIL NFR BLD AUTO: 0.4 %
ERYTHROCYTE [DISTWIDTH] IN BLOOD BY AUTOMATED COUNT: 14.7 % (ref 10–15)
GFR SERPL CREATININE-BSD FRML MDRD: 82 ML/MIN/{1.73_M2}
GLUCOSE SERPL-MCNC: 86 MG/DL (ref 70–99)
HAPTOGLOB SERPL-MCNC: <3 MG/DL (ref 32–197)
HCT VFR BLD AUTO: 32.3 % (ref 35–47)
HGB BLD-MCNC: 9.9 G/DL (ref 11.7–15.7)
IGA SERPL-MCNC: <2 MG/DL (ref 84–499)
IGG SERPL-MCNC: 661 MG/DL (ref 610–1616)
IGM SERPL-MCNC: 13 MG/DL (ref 35–242)
IMM GRANULOCYTES # BLD: 0 10E9/L (ref 0–0.4)
IMM GRANULOCYTES NFR BLD: 0.4 %
LDH SERPL L TO P-CCNC: 231 U/L (ref 81–234)
LYMPHOCYTES # BLD AUTO: 1.9 10E9/L (ref 0.8–5.3)
LYMPHOCYTES NFR BLD AUTO: 40 %
MCH RBC QN AUTO: 27.4 PG (ref 26.5–33)
MCHC RBC AUTO-ENTMCNC: 30.7 G/DL (ref 31.5–36.5)
MCV RBC AUTO: 90 FL (ref 78–100)
MONOCYTES # BLD AUTO: 0.6 10E9/L (ref 0–1.3)
MONOCYTES NFR BLD AUTO: 12.4 %
NEUTROPHILS # BLD AUTO: 2.2 10E9/L (ref 1.6–8.3)
NEUTROPHILS NFR BLD AUTO: 46.4 %
NRBC # BLD AUTO: 0 10*3/UL
NRBC BLD AUTO-RTO: 0 /100
PLATELET # BLD AUTO: 159 10E9/L (ref 150–450)
POTASSIUM SERPL-SCNC: 4 MMOL/L (ref 3.4–5.3)
PROT SERPL-MCNC: 6.5 G/DL (ref 6.8–8.8)
RBC # BLD AUTO: 3.61 10E12/L (ref 3.8–5.2)
RETICS # AUTO: 92.8 10E9/L (ref 25–95)
RETICS/RBC NFR AUTO: 2.6 % (ref 0.5–2)
SODIUM SERPL-SCNC: 139 MMOL/L (ref 133–144)
WBC # BLD AUTO: 4.8 10E9/L (ref 4–11)

## 2021-01-07 PROCEDURE — 83010 ASSAY OF HAPTOGLOBIN QUANT: CPT | Performed by: INTERNAL MEDICINE

## 2021-01-07 PROCEDURE — 82784 ASSAY IGA/IGD/IGG/IGM EACH: CPT | Performed by: INTERNAL MEDICINE

## 2021-01-07 PROCEDURE — 85025 COMPLETE CBC W/AUTO DIFF WBC: CPT | Performed by: INTERNAL MEDICINE

## 2021-01-07 PROCEDURE — 80053 COMPREHEN METABOLIC PANEL: CPT | Performed by: INTERNAL MEDICINE

## 2021-01-07 PROCEDURE — 36415 COLL VENOUS BLD VENIPUNCTURE: CPT

## 2021-01-07 PROCEDURE — 85045 AUTOMATED RETICULOCYTE COUNT: CPT | Performed by: INTERNAL MEDICINE

## 2021-01-07 PROCEDURE — 83615 LACTATE (LD) (LDH) ENZYME: CPT | Performed by: INTERNAL MEDICINE

## 2021-01-08 ENCOUNTER — PATIENT OUTREACH (OUTPATIENT)
Dept: ONCOLOGY | Facility: CLINIC | Age: 81
End: 2021-01-08

## 2021-01-08 NOTE — PROGRESS NOTES
Tricia called in to clinic reporting her IgG level recently was > 600 and she is wondering if she needs to cancel next week's IVIG appt.     Per Dr. Boyd's note from 11/19/20, pt should only receive IVIG if IgG level is < 600.     Dr. Boyd also requests monthly labs. Since all standing labs were done 1/7/21, writer will cancel IVIG/lab appt scheduled 1/13/21.     Tricia verbalized understanding. She is next scheduled for labs on 2/4/21, with possible IVIG on 2/10/21.    Anabell Hawkins, RN, BSN, COLLEEN  RN Care Coordinator  [facility]  [phone]

## 2021-02-04 ENCOUNTER — HOSPITAL ENCOUNTER (OUTPATIENT)
Facility: CLINIC | Age: 81
Setting detail: SPECIMEN
Discharge: HOME | End: 2021-02-04
Attending: INTERNAL MEDICINE | Admitting: INTERNAL MEDICINE
Payer: MEDICARE

## 2021-02-04 DIAGNOSIS — D59.19 OTHER AUTOIMMUNE HEMOLYTIC ANEMIA (H): ICD-10-CM

## 2021-02-04 LAB
ALBUMIN SERPL-MCNC: 4 G/DL (ref 3.4–5)
ALP SERPL-CCNC: 120 U/L (ref 40–150)
ALT SERPL W P-5'-P-CCNC: 20 U/L (ref 0–50)
ANION GAP SERPL CALCULATED.3IONS-SCNC: 2 MMOL/L (ref 3–14)
AST SERPL W P-5'-P-CCNC: 32 U/L (ref 0–45)
BASOPHILS # BLD AUTO: 0 10E9/L (ref 0–0.2)
BASOPHILS NFR BLD AUTO: 0.4 %
BILIRUB SERPL-MCNC: 1 MG/DL (ref 0.2–1.3)
BUN SERPL-MCNC: 17 MG/DL (ref 7–30)
CALCIUM SERPL-MCNC: 8.6 MG/DL (ref 8.5–10.1)
CHLORIDE SERPL-SCNC: 106 MMOL/L (ref 94–109)
CO2 SERPL-SCNC: 30 MMOL/L (ref 20–32)
CREAT SERPL-MCNC: 0.7 MG/DL (ref 0.52–1.04)
DIFFERENTIAL METHOD BLD: ABNORMAL
EOSINOPHIL # BLD AUTO: 0.1 10E9/L (ref 0–0.7)
EOSINOPHIL NFR BLD AUTO: 1 %
ERYTHROCYTE [DISTWIDTH] IN BLOOD BY AUTOMATED COUNT: 15.9 % (ref 10–15)
GFR SERPL CREATININE-BSD FRML MDRD: 82 ML/MIN/{1.73_M2}
GLUCOSE SERPL-MCNC: 85 MG/DL (ref 70–99)
HCT VFR BLD AUTO: 34.6 % (ref 35–47)
HGB BLD-MCNC: 10.7 G/DL (ref 11.7–15.7)
IMM GRANULOCYTES # BLD: 0.1 10E9/L (ref 0–0.4)
IMM GRANULOCYTES NFR BLD: 1 %
LDH SERPL L TO P-CCNC: 276 U/L (ref 81–234)
LYMPHOCYTES # BLD AUTO: 2.8 10E9/L (ref 0.8–5.3)
LYMPHOCYTES NFR BLD AUTO: 39.9 %
MCH RBC QN AUTO: 28.8 PG (ref 26.5–33)
MCHC RBC AUTO-ENTMCNC: 30.9 G/DL (ref 31.5–36.5)
MCV RBC AUTO: 93 FL (ref 78–100)
MONOCYTES # BLD AUTO: 0.9 10E9/L (ref 0–1.3)
MONOCYTES NFR BLD AUTO: 12.9 %
NEUTROPHILS # BLD AUTO: 3.1 10E9/L (ref 1.6–8.3)
NEUTROPHILS NFR BLD AUTO: 44.8 %
NRBC # BLD AUTO: 0 10*3/UL
NRBC BLD AUTO-RTO: 0 /100
PLATELET # BLD AUTO: 196 10E9/L (ref 150–450)
POTASSIUM SERPL-SCNC: 3.7 MMOL/L (ref 3.4–5.3)
PROT SERPL-MCNC: 6.5 G/DL (ref 6.8–8.8)
RBC # BLD AUTO: 3.72 10E12/L (ref 3.8–5.2)
RETICS # AUTO: 111.2 10E9/L (ref 25–95)
RETICS/RBC NFR AUTO: 3 % (ref 0.5–2)
SODIUM SERPL-SCNC: 138 MMOL/L (ref 133–144)
WBC # BLD AUTO: 6.9 10E9/L (ref 4–11)

## 2021-02-04 PROCEDURE — 80053 COMPREHEN METABOLIC PANEL: CPT | Performed by: INTERNAL MEDICINE

## 2021-02-04 PROCEDURE — 36415 COLL VENOUS BLD VENIPUNCTURE: CPT

## 2021-02-04 PROCEDURE — 83010 ASSAY OF HAPTOGLOBIN QUANT: CPT | Performed by: INTERNAL MEDICINE

## 2021-02-04 PROCEDURE — 85025 COMPLETE CBC W/AUTO DIFF WBC: CPT | Performed by: INTERNAL MEDICINE

## 2021-02-04 PROCEDURE — 82784 ASSAY IGA/IGD/IGG/IGM EACH: CPT | Performed by: INTERNAL MEDICINE

## 2021-02-04 PROCEDURE — 85045 AUTOMATED RETICULOCYTE COUNT: CPT | Performed by: INTERNAL MEDICINE

## 2021-02-04 PROCEDURE — 83615 LACTATE (LD) (LDH) ENZYME: CPT | Performed by: INTERNAL MEDICINE

## 2021-02-05 LAB
HAPTOGLOB SERPL-MCNC: <3 MG/DL (ref 32–197)
IGA SERPL-MCNC: <2 MG/DL (ref 84–499)
IGG SERPL-MCNC: 500 MG/DL (ref 610–1616)
IGM SERPL-MCNC: 13 MG/DL (ref 35–242)

## 2021-02-10 ENCOUNTER — INFUSION THERAPY VISIT (OUTPATIENT)
Dept: INFUSION THERAPY | Facility: CLINIC | Age: 81
End: 2021-02-10
Attending: INTERNAL MEDICINE
Payer: MEDICARE

## 2021-02-10 VITALS
SYSTOLIC BLOOD PRESSURE: 127 MMHG | RESPIRATION RATE: 16 BRPM | OXYGEN SATURATION: 97 % | HEART RATE: 77 BPM | TEMPERATURE: 97.9 F | BODY MASS INDEX: 21.37 KG/M2 | DIASTOLIC BLOOD PRESSURE: 71 MMHG | WEIGHT: 157.6 LBS

## 2021-02-10 DIAGNOSIS — D83.9 CVID (COMMON VARIABLE IMMUNODEFICIENCY) (H): Primary | ICD-10-CM

## 2021-02-10 PROCEDURE — 96366 THER/PROPH/DIAG IV INF ADDON: CPT

## 2021-02-10 PROCEDURE — 96365 THER/PROPH/DIAG IV INF INIT: CPT

## 2021-02-10 PROCEDURE — 96375 TX/PRO/DX INJ NEW DRUG ADDON: CPT

## 2021-02-10 PROCEDURE — 250N000011 HC RX IP 250 OP 636: Performed by: INTERNAL MEDICINE

## 2021-02-10 RX ADMIN — HYDROCORTISONE SODIUM SUCCINATE 100 MG: 100 INJECTION, POWDER, FOR SOLUTION INTRAMUSCULAR; INTRAVENOUS at 10:20

## 2021-02-10 RX ADMIN — HUMAN IMMUNOGLOBULIN G 35 G: 20 LIQUID INTRAVENOUS at 10:20

## 2021-02-10 NOTE — PROGRESS NOTES
Infusion Nursing Note:  Maandrew Sosa presents today for IVIG.    Patient seen by provider today: No   present during visit today: Not Applicable.    Note: Labs done 2/4. .  Patient only wanted solu-cortef as premed. IVIG  increased by 1ml/kg/hr, max rate 4ml/kg/hr.     Intravenous Access:  Peripheral IV placed.    Treatment Conditions:  Biological Infusion Checklist:  ~~~ NOTE: If the patient answers yes to any of the questions below, hold the infusion and contact ordering provider or on-call provider.    1. Have you recently had an elevated temperature, fever, chills, productive cough, coughing for 3 weeks or longer or hemoptysis, abnormal vital signs, night sweats,  chest pain or have you noticed a decrease in your appetite, unexplained weight loss or fatigue? No  2. Do you have any open wounds or new incisions? No  3. Do you have any recent or upcoming hospitalizations, surgeries or dental procedures? No  4. Do you currently have or recently have had any signs of illness or infection or are you on any antibiotics? No  5. Have you had any new, sudden or worsening abdominal pain? No  6. Have you or anyone in your household received a live vaccination in the past 4 weeks? Please note:  No live vaccines while on biologic/chemotherapy until 6 months after the last treatment.  Patient can receive the flu vaccine (shot only) and the pneumovax.  It is optimal for the patient to get these vaccines mid cycle, but they can be given at any time as long as it is not on the day of the infusion. No  7. Have you recently been diagnosed with any new nervous system diseases (ie. Multiple sclerosis, Guillain [place], seizures, neurological changes) or cancer diagnosis? No  8. Are you on any form of radiation or chemotherapy? No  9. Are you pregnant or breast feeding or do you have plans of pregnancy in the future? No  10. Have you been having any signs of worsening depression or suicidal ideations?  (benlysta  only) No  11. Have there been any other new onset medical symptoms? No    Post Infusion Assessment:  Patient tolerated infusion without incident.  Site patent and intact, free from redness, edema or discomfort.  No evidence of extravasations.  Access discontinued per protocol.       Discharge Plan:   AVS to patient via MYCHART.   Patient discharged in stable condition accompanied by: self.  Departure Mode: Ambulatory.    Ally Eller RN

## 2021-02-12 ENCOUNTER — IMMUNIZATION (OUTPATIENT)
Dept: NURSING | Facility: CLINIC | Age: 81
End: 2021-02-12
Payer: MEDICARE

## 2021-02-12 PROCEDURE — 0001A PR COVID VAC PFIZER DIL RECON 30 MCG/0.3 ML IM: CPT

## 2021-02-12 PROCEDURE — 91300 PR COVID VAC PFIZER DIL RECON 30 MCG/0.3 ML IM: CPT

## 2021-02-18 ENCOUNTER — VIRTUAL VISIT (OUTPATIENT)
Dept: ONCOLOGY | Facility: CLINIC | Age: 81
End: 2021-02-18
Attending: INTERNAL MEDICINE
Payer: MEDICARE

## 2021-02-18 DIAGNOSIS — D83.9 CVID (COMMON VARIABLE IMMUNODEFICIENCY) (H): ICD-10-CM

## 2021-02-18 DIAGNOSIS — D59.19 OTHER AUTOIMMUNE HEMOLYTIC ANEMIA (H): Primary | ICD-10-CM

## 2021-02-18 PROCEDURE — 99442 PR PHYSICIAN TELEPHONE EVALUATION 11-20 MIN: CPT | Mod: 95 | Performed by: INTERNAL MEDICINE

## 2021-02-18 PROCEDURE — 999N001193 HC VIDEO/TELEPHONE VISIT; NO CHARGE

## 2021-02-18 NOTE — LETTER
2/18/2021         RE: Tricia Sosa  [id number]  [facility] [address]        Dear Colleague,    Thank you for referring your patient, Tricia Sosa, to the [facility]. Please see a copy of my visit note below.    Tricia is a 80 year old who is being evaluated via a billable telephone visit.      What phone number would you like to be contacted at? [phone]  How would you like to obtain your AVS? Dax      [facility]    Hematology/Oncology Established Patient Follow-up Note      Today's Date: 2/18/21    Reason for Follow-up: Hemolytic anemia-autoimmune; IgA deficiency    HISTORY OF PRESENT ILLNESS: Tricia Sosa is a 80 year old female who presents with autoimmune hemolytic anemia and IgA deficiency.  She previously saw Dr. Matos and then Dr. Summers.  She was previously seen at [facility].    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  She was very sick at the time and had severe diarrhea.  She lost about 40 pounds in weight.  The workup revealed that she had markedly diminished CD4 counts of 48 and was diagnosed with CMV colitis.  Since then she has been followed in hematology clinic at [place] until recently.  She was noted to have anemia which was fairly long-standing.  She was initially referred for anemia in 2004 and also had a bone marrow biopsy done at that time which revealed hypercellular bone marrow with 70-80% cellularity and normal trilineage hematopoiesis and no morphologic features of MDS or lymphoproliferation.  Her anemia was attributed to her chronic underlying disease.  At the next evaluation in 2002 on 4 aggressive anemia there was no evidence of hemolysis, iron deficiency, B12 or folate deficiency.  Bone marrow biopsy was not pursued.  In summer of 2015 she had progressive fatigue, dyspnea on exertion and worsening anemia with a hemoglobin now of 9.  Workup at this time did  suggest a hemolytic anemia with elevated LDH at 709, undetectable haptoglobin and elevated unconjugated bilirubin at 3.3.  Monospecific MARIKA was positive for both IgG and complement.  Cold agglutinin screen was positive with very low titer 1:64.  She was started on prednisone 60 mg daily with supplementation of iron folate and B12 starting 7/31/15.  Her prednisone has been slowly tapered and was discontinued off in January 2016.  She was last followed at [facility] on March 10, 2016 when she had stable hemoglobin.    She was followed by Dr. Matos and Dr. Summers.  Due to relapse of hemolytic anemia, she underwent another course of prednisone that has since been tapered off and rituximab x 4 weekly doses completed in 9/19/19.    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  She started weekly Rituxan on 7/31/20 x 4 doses, completed on 8/21/20.  She tapered off of prednisone in October 2020.      INTERIM HISTORY: Tricia is feeling well.  She was able to get her first COVID vaccine last week.   She is also planning to get cataract surgery at some point.      REVIEW OF SYSTEMS:   14 point ROS was reviewed and is negative other than as noted above in HPI.       HOME MEDICATIONS:  Current Outpatient Medications   Medication Sig Dispense Refill     cyanocobalamin (VITAMIN  B-12) 1000 MCG tablet   3     folic acid (FOLVITE) 1 MG tablet   3     hydrochlorothiazide (HYDRODIURIL) 12.5 MG tablet TAKE ONE TABLET BY MOUTH ONE TIME DAILY  30 tablet 0     hydrocortisone butyrate (LOCOID) 0.1 % SOLN solution Apply to scalp bid prn 60 mL 11     ketoconazole (NIZORAL) 2 % external shampoo Use every other day to scalp as needed 120 mL 11     levothyroxine (SYNTHROID/LEVOTHROID) 150 MCG tablet Take 1 tablet (150 mcg) by mouth daily 90 tablet 3     sulfamethoxazole-trimethoprim (BACTRIM DS) 800-160 MG tablet Take 1 pill orally on Mon, Wed and Friday 45 tablet 3     triamcinolone  (KENALOG) 0.1 % external cream        UNABLE TO FIND privigen inj monthly           ALLERGIES:  Allergies   Allergen Reactions     Doxycycline          PAST MEDICAL HISTORY:  Past Medical History:   Diagnosis Date     WARD (dyspnea on exertion)      External hemorrhoids without mention of complication 6/27/05     Hemolytic anemia (H)     autoimmune     Hypothyroidism      IgA deficiency (H)      Other malaise and fatigue      Other severe protein-calorie malnutrition      Thyroid disease      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, and posterior vitreous detachment     Urinary tract infection, site not specified     Recurrent UTI's         PAST SURGICAL HISTORY:  Past Surgical History:   Procedure Laterality Date     C LIGATE FALLOPIAN TUBE       COLONOSCOPY N/A 4/26/2016    Procedure: COLONOSCOPY;  Surgeon: Dontae Del Rio MD;  Location:  GI     HC COLONOSCOPY W BIOPSY  4/26/00     HC COLONOSCOPY W BIOPSY  10/8/03     HC COLONOSCOPY W BIOPSY  6/27/05    REPEAT IN 5 YEARS     HC CYSTOSCOPY,INSERT URETERAL STENT      Ureteral stent insertion     HC FLEX SIGMOIDOSCOPY W BIOPSY  5/30/00     HC LAPAROSCOPY, SURGICAL; CHOLECYSTECTOMY  1992      THYROIDECTOMY       LIGATION OF HEMORRHOID(S)      Hemorrhoidectomy     UPPER GI ENDOSCOPY,BIOPSY  10/01/01         SOCIAL HISTORY:  Social History     Socioeconomic History     Marital status:      Spouse name: Not on file     Number of children: Not on file     Years of education: Not on file     Highest education level: Not on file   Occupational History     Not on file   Social Needs     Financial resource strain: Not on file     Food insecurity     Worry: Not on file     Inability: Not on file     Transportation needs     Medical: Not on file     Non-medical: Not on file   Tobacco Use     Smoking status: Never Smoker     Smokeless tobacco: Never Used   Substance and Sexual Activity     Alcohol use: No     Alcohol/week: 0.0  standard drinks     Frequency: Never     Drug use: No     Sexual activity: Never   Lifestyle     Physical activity     Days per week: Not on file     Minutes per session: Not on file     Stress: Not on file   Relationships     Social connections     Talks on phone: Not on file     Gets together: Not on file     Attends [employer] service: Not on file     Active member of club or organization: Not on file     Attends meetings of clubs or organizations: Not on file     Relationship status: Not on file     Intimate partner violence     Fear of current or ex partner: Not on file     Emotionally abused: Not on file     Physically abused: Not on file     Forced sexual activity: Not on file   Other Topics Concern     Parent/sibling w/ CABG, MI or angioplasty before 65F 55M? Not Asked   Social History Narrative     Not on file         FAMILY HISTORY:  Family History   Problem Relation Age of Onset     Colon Cancer Mother 90     Cerebrovascular Disease Father          at age 85     Dementia Brother      Dementia Brother      Pancreatic Cancer Brother      Breast Cancer Sister          PHYSICAL EXAM:  Phone visit.      LABS:  CBC RESULTS:   Recent Labs   Lab Test 21  1027   WBC 6.9   RBC 3.72*   HGB 10.7*   HCT 34.6*   MCV 93   MCH 28.8   MCHC 30.9*   RDW 15.9*        Recent Labs   Lab Test 21  1027 21  1010    139   POTASSIUM 3.7 4.0   CHLORIDE 106 108   CO2 30 27   ANIONGAP 2* 4   GLC 85 86   BUN 17 13   CR 0.70 0.70   CULLEN 8.6 8.5     Lab Results   Component Value Date    AST 32 2021     Lab Results   Component Value Date    ALT 20 2021     No results found for: BILICONJ   Lab Results   Component Value Date    BILITOTAL 1.0 2021     Lab Results   Component Value Date    ALBUMIN 4.0 2021     Lab Results   Component Value Date    PROTTOTAL 6.5 2021      Lab Results   Component Value Date    ALKPHOS 120 2021         ASSESSMENT/PLAN:  Tricia Sosa is a 79  year old female with:      1. 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 Common variable immunodeficiency disorder  4. Splenomegaly        1) Autoimmune hemolytic anemia: She had relapse in July 2020, and started on prednisone, as well as weekly rituximab infusions x 4, completed on 8/21/20.  Her hemoglobin has slowly improved back up to her baseline and has been stable.    We also previously talked about other treatment options if her disease becomes refractory to steroids, such as splenectomy.      -she has tapered off of prednisone as of early October 2020  -hemoglobin has remained stable  -she will now have labs done every 6 weeks, since they have been stable  -follow-up with me in 3-4 months    2) CVID:   -continue IVIG.  She gets it, if IgG is <600.  It's been about every other month.  She would like to get labs done every 6 weeks and see how often she needs it.  That is reasonable.       Sabina Boyd MD  Hematology/Oncology  [facility]        Phone call duration: 7 minutes    Total time spent on day of visit, including review of tests, obtaining/reviewing separately obtained history, ordering medications/tests/procedures, communicating with PCP/consultants, and documenting in electronic medical record: 15 minutes        Again, thank you for allowing me to participate in the care of your patient.        Sincerely,        Sabina Boyd MD

## 2021-02-18 NOTE — LETTER
2/18/2021         RE: Tricia Sosa  [id number]  [facility] [address]        Dear Colleague,    Thank you for referring your patient, Tricia Sosa, to the [facility]. Please see a copy of my visit note below.    Tricia is a 80 year old who is being evaluated via a billable telephone visit.      What phone number would you like to be contacted at? [phone]  How would you like to obtain your AVS? Dax      [facility]    Hematology/Oncology Established Patient Follow-up Note      Today's Date: 2/18/21    Reason for Follow-up: Hemolytic anemia-autoimmune; IgA deficiency    HISTORY OF PRESENT ILLNESS: Tricia Sosa is a 80 year old female who presents with autoimmune hemolytic anemia and IgA deficiency.  She previously saw Dr. Matos and then Dr. Summers.  She was previously seen at [facility].    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  She was very sick at the time and had severe diarrhea.  She lost about 40 pounds in weight.  The workup revealed that she had markedly diminished CD4 counts of 48 and was diagnosed with CMV colitis.  Since then she has been followed in hematology clinic at [place] until recently.  She was noted to have anemia which was fairly long-standing.  She was initially referred for anemia in 2004 and also had a bone marrow biopsy done at that time which revealed hypercellular bone marrow with 70-80% cellularity and normal trilineage hematopoiesis and no morphologic features of MDS or lymphoproliferation.  Her anemia was attributed to her chronic underlying disease.  At the next evaluation in 2002 on 4 aggressive anemia there was no evidence of hemolysis, iron deficiency, B12 or folate deficiency.  Bone marrow biopsy was not pursued.  In summer of 2015 she had progressive fatigue, dyspnea on exertion and worsening anemia with a hemoglobin now of 9.  Workup at this time did  suggest a hemolytic anemia with elevated LDH at 709, undetectable haptoglobin and elevated unconjugated bilirubin at 3.3.  Monospecific MARIKA was positive for both IgG and complement.  Cold agglutinin screen was positive with very low titer 1:64.  She was started on prednisone 60 mg daily with supplementation of iron folate and B12 starting 7/31/15.  Her prednisone has been slowly tapered and was discontinued off in January 2016.  She was last followed at [facility] on March 10, 2016 when she had stable hemoglobin.    She was followed by Dr. Matos and Dr. Summers.  Due to relapse of hemolytic anemia, she underwent another course of prednisone that has since been tapered off and rituximab x 4 weekly doses completed in 9/19/19.    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  She started weekly Rituxan on 7/31/20 x 4 doses, completed on 8/21/20.  She tapered off of prednisone in October 2020.      INTERIM HISTORY: Tricia is feeling well.  She was able to get her first COVID vaccine last week.   She is also planning to get cataract surgery at some point.      REVIEW OF SYSTEMS:   14 point ROS was reviewed and is negative other than as noted above in HPI.       HOME MEDICATIONS:  Current Outpatient Medications   Medication Sig Dispense Refill     cyanocobalamin (VITAMIN  B-12) 1000 MCG tablet   3     folic acid (FOLVITE) 1 MG tablet   3     hydrochlorothiazide (HYDRODIURIL) 12.5 MG tablet TAKE ONE TABLET BY MOUTH ONE TIME DAILY  30 tablet 0     hydrocortisone butyrate (LOCOID) 0.1 % SOLN solution Apply to scalp bid prn 60 mL 11     ketoconazole (NIZORAL) 2 % external shampoo Use every other day to scalp as needed 120 mL 11     levothyroxine (SYNTHROID/LEVOTHROID) 150 MCG tablet Take 1 tablet (150 mcg) by mouth daily 90 tablet 3     sulfamethoxazole-trimethoprim (BACTRIM DS) 800-160 MG tablet Take 1 pill orally on Mon, Wed and Friday 45 tablet 3     triamcinolone  (KENALOG) 0.1 % external cream        UNABLE TO FIND privigen inj monthly           ALLERGIES:  Allergies   Allergen Reactions     Doxycycline          PAST MEDICAL HISTORY:  Past Medical History:   Diagnosis Date     WARD (dyspnea on exertion)      External hemorrhoids without mention of complication 6/27/05     Hemolytic anemia (H)     autoimmune     Hypothyroidism      IgA deficiency (H)      Other malaise and fatigue      Other severe protein-calorie malnutrition      Thyroid disease      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, and posterior vitreous detachment     Urinary tract infection, site not specified     Recurrent UTI's         PAST SURGICAL HISTORY:  Past Surgical History:   Procedure Laterality Date     C LIGATE FALLOPIAN TUBE       COLONOSCOPY N/A 4/26/2016    Procedure: COLONOSCOPY;  Surgeon: Dontae Del Rio MD;  Location:  GI     HC COLONOSCOPY W BIOPSY  4/26/00     HC COLONOSCOPY W BIOPSY  10/8/03     HC COLONOSCOPY W BIOPSY  6/27/05    REPEAT IN 5 YEARS     HC CYSTOSCOPY,INSERT URETERAL STENT      Ureteral stent insertion     HC FLEX SIGMOIDOSCOPY W BIOPSY  5/30/00     HC LAPAROSCOPY, SURGICAL; CHOLECYSTECTOMY  1992      THYROIDECTOMY       LIGATION OF HEMORRHOID(S)      Hemorrhoidectomy     UPPER GI ENDOSCOPY,BIOPSY  10/01/01         SOCIAL HISTORY:  Social History     Socioeconomic History     Marital status:      Spouse name: Not on file     Number of children: Not on file     Years of education: Not on file     Highest education level: Not on file   Occupational History     Not on file   Social Needs     Financial resource strain: Not on file     Food insecurity     Worry: Not on file     Inability: Not on file     Transportation needs     Medical: Not on file     Non-medical: Not on file   Tobacco Use     Smoking status: Never Smoker     Smokeless tobacco: Never Used   Substance and Sexual Activity     Alcohol use: No     Alcohol/week: 0.0  standard drinks     Frequency: Never     Drug use: No     Sexual activity: Never   Lifestyle     Physical activity     Days per week: Not on file     Minutes per session: Not on file     Stress: Not on file   Relationships     Social connections     Talks on phone: Not on file     Gets together: Not on file     Attends Hindu service: Not on file     Active member of club or organization: Not on file     Attends meetings of clubs or organizations: Not on file     Relationship status: Not on file     Intimate partner violence     Fear of current or ex partner: Not on file     Emotionally abused: Not on file     Physically abused: Not on file     Forced sexual activity: Not on file   Other Topics Concern     Parent/sibling w/ CABG, MI or angioplasty before 65F 55M? Not Asked   Social History Narrative     Not on file         FAMILY HISTORY:  Family History   Problem Relation Age of Onset     Colon Cancer Mother 90     Cerebrovascular Disease Father          at age 85     Dementia Brother      Dementia Brother      Pancreatic Cancer Brother      Breast Cancer Sister          PHYSICAL EXAM:  Phone visit.      LABS:  CBC RESULTS:   Recent Labs   Lab Test 21  1027   WBC 6.9   RBC 3.72*   HGB 10.7*   HCT 34.6*   MCV 93   MCH 28.8   MCHC 30.9*   RDW 15.9*        Recent Labs   Lab Test 21  1027 21  1010    139   POTASSIUM 3.7 4.0   CHLORIDE 106 108   CO2 30 27   ANIONGAP 2* 4   GLC 85 86   BUN 17 13   CR 0.70 0.70   CULLEN 8.6 8.5     Lab Results   Component Value Date    AST 32 2021     Lab Results   Component Value Date    ALT 20 2021     No results found for: BILICONJ   Lab Results   Component Value Date    BILITOTAL 1.0 2021     Lab Results   Component Value Date    ALBUMIN 4.0 2021     Lab Results   Component Value Date    PROTTOTAL 6.5 2021      Lab Results   Component Value Date    ALKPHOS 120 2021         ASSESSMENT/PLAN:  Tricia Sosa is a 79  year old female with:      1. 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 Common variable immunodeficiency disorder  4. Splenomegaly        1) Autoimmune hemolytic anemia: She had relapse in July 2020, and started on prednisone, as well as weekly rituximab infusions x 4, completed on 8/21/20.  Her hemoglobin has slowly improved back up to her baseline and has been stable.    We also previously talked about other treatment options if her disease becomes refractory to steroids, such as splenectomy.      -she has tapered off of prednisone as of early October 2020  -hemoglobin has remained stable  -she will now have labs done every 6 weeks, since they have been stable  -follow-up with me in 3-4 months    2) CVID:   -continue IVIG.  She gets it, if IgG is <600.  It's been about every other month.  She would like to get labs done every 6 weeks and see how often she needs it.  That is reasonable.       Sabina Boyd MD  Hematology/Oncology  [facility]        Phone call duration: 7 minutes    Total time spent on day of visit, including review of tests, obtaining/reviewing separately obtained history, ordering medications/tests/procedures, communicating with PCP/consultants, and documenting in electronic medical record: 15 minutes        Again, thank you for allowing me to participate in the care of your patient.        Sincerely,        Sabina Boyd MD

## 2021-02-18 NOTE — PROGRESS NOTES
Tricia is a 80 year old who is being evaluated via a billable telephone visit.      What phone number would you like to be contacted at? [phone]  How would you like to obtain your AVS? Dax      [facility]    Hematology/Oncology Established Patient Follow-up Note      Today's Date: 2/18/21    Reason for Follow-up: Hemolytic anemia-autoimmune; IgA deficiency    HISTORY OF PRESENT ILLNESS: Tricia Sosa is a 80 year old female who presents with autoimmune hemolytic anemia and IgA deficiency.  She previously saw Dr. Matos and then Dr. Summers.  She was previously seen at [facility].    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  She was very sick at the time and had severe diarrhea.  She lost about 40 pounds in weight.  The workup revealed that she had markedly diminished CD4 counts of 48 and was diagnosed with CMV colitis.  Since then she has been followed in hematology clinic at [facility] until recently.  She was noted to have anemia which was fairly long-standing.  She was initially referred for anemia in 2004 and also had a bone marrow biopsy done at that time which revealed hypercellular bone marrow with 70-80% cellularity and normal trilineage hematopoiesis and no morphologic features of MDS or lymphoproliferation.  Her anemia was attributed to her chronic underlying disease.  At the next evaluation in 2002 on 4 aggressive anemia there was no evidence of hemolysis, iron deficiency, B12 or folate deficiency.  Bone marrow biopsy was not pursued.  In summer of 2015 she had progressive fatigue, dyspnea on exertion and worsening anemia with a hemoglobin now of 9.  Workup at this time did suggest a hemolytic anemia with elevated LDH at 709, undetectable haptoglobin and elevated unconjugated bilirubin at 3.3.  Monospecific MARIKA was positive for both IgG and complement.  Cold agglutinin screen was positive with very low titer 1:64.  She was started on  prednisone 60 mg daily with supplementation of iron folate and B12 starting 7/31/15.  Her prednisone has been slowly tapered and was discontinued off in January 2016.  She was last followed at [facility] on March 10, 2016 when she had stable hemoglobin.    She was followed by Dr. Matos and Dr. Summres.  Due to relapse of hemolytic anemia, she underwent another course of prednisone that has since been tapered off and rituximab x 4 weekly doses completed in 9/19/19.    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  She started weekly Rituxan on 7/31/20 x 4 doses, completed on 8/21/20.  She tapered off of prednisone in October 2020.      INTERIM HISTORY: Tricia is feeling well.  She was able to get her first COVID vaccine last week.   She is also planning to get cataract surgery at some point.      REVIEW OF SYSTEMS:   14 point ROS was reviewed and is negative other than as noted above in HPI.       HOME MEDICATIONS:  Current Outpatient Medications   Medication Sig Dispense Refill     cyanocobalamin (VITAMIN  B-12) 1000 MCG tablet   3     folic acid (FOLVITE) 1 MG tablet   3     hydrochlorothiazide (HYDRODIURIL) 12.5 MG tablet TAKE ONE TABLET BY MOUTH ONE TIME DAILY  30 tablet 0     hydrocortisone butyrate (LOCOID) 0.1 % SOLN solution Apply to scalp bid prn 60 mL 11     ketoconazole (NIZORAL) 2 % external shampoo Use every other day to scalp as needed 120 mL 11     levothyroxine (SYNTHROID/LEVOTHROID) 150 MCG tablet Take 1 tablet (150 mcg) by mouth daily 90 tablet 3     sulfamethoxazole-trimethoprim (BACTRIM DS) 800-160 MG tablet Take 1 pill orally on Mon, Wed and Friday 45 tablet 3     triamcinolone (KENALOG) 0.1 % external cream        UNABLE TO FIND privigen inj monthly           ALLERGIES:  Allergies   Allergen Reactions     Doxycycline          PAST MEDICAL HISTORY:  Past Medical History:   Diagnosis Date     WARD (dyspnea on exertion)      External hemorrhoids  without mention of complication 6/27/05     Hemolytic anemia (H)     autoimmune     Hypothyroidism      IgA deficiency (H)      Other malaise and fatigue      Other severe protein-calorie malnutrition      Thyroid disease      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, and posterior vitreous detachment     Urinary tract infection, site not specified     Recurrent UTI's         PAST SURGICAL HISTORY:  Past Surgical History:   Procedure Laterality Date     C LIGATE FALLOPIAN TUBE       COLONOSCOPY N/A 4/26/2016    Procedure: COLONOSCOPY;  Surgeon: Dontae Del Rio MD;  Location: [facility]     HC COLONOSCOPY W BIOPSY  4/26/00     HC COLONOSCOPY W BIOPSY  10/8/03     HC COLONOSCOPY W BIOPSY  6/27/05    REPEAT IN 5 YEARS     HC CYSTOSCOPY,INSERT URETERAL STENT      Ureteral stent insertion     HC FLEX SIGMOIDOSCOPY W BIOPSY  5/30/00      LAPAROSCOPY, SURGICAL; CHOLECYSTECTOMY  1992      THYROIDECTOMY       LIGATION OF HEMORRHOID(S)      Hemorrhoidectomy     UPPER GI ENDOSCOPY,BIOPSY  10/01/01         SOCIAL HISTORY:  Social History     Socioeconomic History     Marital status:      Spouse name: Not on file     Number of children: Not on file     Years of education: Not on file     Highest education level: Not on file   Occupational History     Not on file   Social Needs     Financial resource strain: Not on file     Food insecurity     Worry: Not on file     Inability: Not on file     Transportation needs     Medical: Not on file     Non-medical: Not on file   Tobacco Use     Smoking status: Never Smoker     Smokeless tobacco: Never Used   Substance and Sexual Activity     Alcohol use: No     Alcohol/week: 0.0 standard drinks     Frequency: Never     Drug use: No     Sexual activity: Never   Lifestyle     Physical activity     Days per week: Not on file     Minutes per session: Not on file     Stress: Not on file   Relationships     Social connections     Talks on phone: Not on  file     Gets together: Not on file     Attends Taoist service: Not on file     Active member of club or organization: Not on file     Attends meetings of clubs or organizations: Not on file     Relationship status: Not on file     Intimate partner violence     Fear of current or ex partner: Not on file     Emotionally abused: Not on file     Physically abused: Not on file     Forced sexual activity: Not on file   Other Topics Concern     Parent/sibling w/ CABG, MI or angioplasty before 65F 55M? Not Asked   Social History Narrative     Not on file         FAMILY HISTORY:  Family History   Problem Relation Age of Onset     Colon Cancer Mother 90     Cerebrovascular Disease Father          at age 85     Dementia Brother      Dementia Brother      Pancreatic Cancer Brother      Breast Cancer Sister          PHYSICAL EXAM:  Phone visit.      LABS:  CBC RESULTS:   Recent Labs   Lab Test 21  1027   WBC 6.9   RBC 3.72*   HGB 10.7*   HCT 34.6*   MCV 93   MCH 28.8   MCHC 30.9*   RDW 15.9*        Recent Labs   Lab Test 21  1027 21  1010    139   POTASSIUM 3.7 4.0   CHLORIDE 106 108   CO2 30 27   ANIONGAP 2* 4   GLC 85 86   BUN 17 13   CR 0.70 0.70   CULLEN 8.6 8.5     Lab Results   Component Value Date    AST 32 2021     Lab Results   Component Value Date    ALT 20 2021     No results found for: BILICONJ   Lab Results   Component Value Date    BILITOTAL 1.0 2021     Lab Results   Component Value Date    ALBUMIN 4.0 2021     Lab Results   Component Value Date    PROTTOTAL 6.5 2021      Lab Results   Component Value Date    ALKPHOS 120 2021         ASSESSMENT/PLAN:  Tricia Sosa is a 79 year old female with:      1. 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 Common variable immunodeficiency disorder  4. Splenomegaly        1) Autoimmune hemolytic anemia: She  had relapse in July 2020, and started on prednisone, as well as weekly rituximab infusions x 4, completed on 8/21/20.  Her hemoglobin has slowly improved back up to her baseline and has been stable.    We also previously talked about other treatment options if her disease becomes refractory to steroids, such as splenectomy.      -she has tapered off of prednisone as of early October 2020  -hemoglobin has remained stable  -she will now have labs done every 6 weeks, since they have been stable  -follow-up with me in 3-4 months    2) CVID:   -continue IVIG.  She gets it, if IgG is <600.  It's been about every other month.  She would like to get labs done every 6 weeks and see how often she needs it.  That is reasonable.       Sbaina Boyd MD  Hematology/Oncology  [facility]        Phone call duration: 7 minutes    Total time spent on day of visit, including review of tests, obtaining/reviewing separately obtained history, ordering medications/tests/procedures, communicating with PCP/consultants, and documenting in electronic medical record: 15 minutes

## 2021-02-24 NOTE — PATIENT INSTRUCTIONS
Labs scheduled 3/18, 4/29, 6/10  Possible IVIG scheduled 3/24, 5/5, 6/16  Appt with Dr. Boyd scheduled 6/10  Anabell Hawkins RN on 2/24/2021 at 10:53 AM

## 2021-03-05 ENCOUNTER — IMMUNIZATION (OUTPATIENT)
Dept: NURSING | Facility: CLINIC | Age: 81
End: 2021-03-05
Attending: INTERNAL MEDICINE
Payer: MEDICARE

## 2021-03-05 PROCEDURE — 91300 PR COVID VAC PFIZER DIL RECON 30 MCG/0.3 ML IM: CPT

## 2021-03-05 PROCEDURE — 0002A PR COVID VAC PFIZER DIL RECON 30 MCG/0.3 ML IM: CPT

## 2021-03-08 RX ORDER — ACETAMINOPHEN 325 MG/1
650 TABLET ORAL ONCE
Status: CANCELLED
Start: 2021-03-08

## 2021-03-08 RX ORDER — HEPARIN SODIUM,PORCINE 10 UNIT/ML
5 VIAL (ML) INTRAVENOUS
Status: CANCELLED | OUTPATIENT
Start: 2021-03-08

## 2021-03-08 RX ORDER — HEPARIN SODIUM (PORCINE) LOCK FLUSH IV SOLN 100 UNIT/ML 100 UNIT/ML
5 SOLUTION INTRAVENOUS
Status: CANCELLED | OUTPATIENT
Start: 2021-03-08

## 2021-03-08 RX ORDER — DIPHENHYDRAMINE HCL 25 MG
50 CAPSULE ORAL ONCE
Status: CANCELLED | OUTPATIENT
Start: 2021-03-08

## 2021-03-18 ENCOUNTER — HOSPITAL ENCOUNTER (OUTPATIENT)
Facility: CLINIC | Age: 81
Setting detail: SPECIMEN
Discharge: HOME | End: 2021-03-18
Attending: INTERNAL MEDICINE | Admitting: INTERNAL MEDICINE
Payer: MEDICARE

## 2021-03-18 DIAGNOSIS — D59.19 OTHER AUTOIMMUNE HEMOLYTIC ANEMIA (H): ICD-10-CM

## 2021-03-18 LAB
ALBUMIN SERPL-MCNC: 4 G/DL (ref 3.4–5)
ALP SERPL-CCNC: 124 U/L (ref 40–150)
ALT SERPL W P-5'-P-CCNC: 23 U/L (ref 0–50)
ANION GAP SERPL CALCULATED.3IONS-SCNC: 5 MMOL/L (ref 3–14)
AST SERPL W P-5'-P-CCNC: 30 U/L (ref 0–45)
BASOPHILS # BLD AUTO: 0 10E9/L (ref 0–0.2)
BASOPHILS NFR BLD AUTO: 0.6 %
BILIRUB SERPL-MCNC: 0.9 MG/DL (ref 0.2–1.3)
BUN SERPL-MCNC: 13 MG/DL (ref 7–30)
CALCIUM SERPL-MCNC: 8.4 MG/DL (ref 8.5–10.1)
CHLORIDE SERPL-SCNC: 106 MMOL/L (ref 94–109)
CO2 SERPL-SCNC: 27 MMOL/L (ref 20–32)
CREAT SERPL-MCNC: 0.73 MG/DL (ref 0.52–1.04)
DIFFERENTIAL METHOD BLD: ABNORMAL
EOSINOPHIL # BLD AUTO: 0.1 10E9/L (ref 0–0.7)
EOSINOPHIL NFR BLD AUTO: 1 %
ERYTHROCYTE [DISTWIDTH] IN BLOOD BY AUTOMATED COUNT: 15.4 % (ref 10–15)
GFR SERPL CREATININE-BSD FRML MDRD: 78 ML/MIN/{1.73_M2}
GLUCOSE SERPL-MCNC: 112 MG/DL (ref 70–99)
HCT VFR BLD AUTO: 32.6 % (ref 35–47)
HGB BLD-MCNC: 9.9 G/DL (ref 11.7–15.7)
IMM GRANULOCYTES # BLD: 0.1 10E9/L (ref 0–0.4)
IMM GRANULOCYTES NFR BLD: 1.2 %
LDH SERPL L TO P-CCNC: 281 U/L (ref 81–234)
LYMPHOCYTES # BLD AUTO: 1.4 10E9/L (ref 0.8–5.3)
LYMPHOCYTES NFR BLD AUTO: 26.7 %
MCH RBC QN AUTO: 29.3 PG (ref 26.5–33)
MCHC RBC AUTO-ENTMCNC: 30.4 G/DL (ref 31.5–36.5)
MCV RBC AUTO: 96 FL (ref 78–100)
MONOCYTES # BLD AUTO: 0.5 10E9/L (ref 0–1.3)
MONOCYTES NFR BLD AUTO: 10.4 %
NEUTROPHILS # BLD AUTO: 3.1 10E9/L (ref 1.6–8.3)
NEUTROPHILS NFR BLD AUTO: 60.1 %
NRBC # BLD AUTO: 0 10*3/UL
NRBC BLD AUTO-RTO: 0 /100
PLATELET # BLD AUTO: 162 10E9/L (ref 150–450)
POTASSIUM SERPL-SCNC: 4.1 MMOL/L (ref 3.4–5.3)
PROT SERPL-MCNC: 6.4 G/DL (ref 6.8–8.8)
RBC # BLD AUTO: 3.38 10E12/L (ref 3.8–5.2)
RETICS # AUTO: 116.9 10E9/L (ref 25–95)
RETICS/RBC NFR AUTO: 3.5 % (ref 0.5–2)
SODIUM SERPL-SCNC: 138 MMOL/L (ref 133–144)
WBC # BLD AUTO: 5.1 10E9/L (ref 4–11)

## 2021-03-18 PROCEDURE — 82784 ASSAY IGA/IGD/IGG/IGM EACH: CPT | Performed by: INTERNAL MEDICINE

## 2021-03-18 PROCEDURE — 85045 AUTOMATED RETICULOCYTE COUNT: CPT | Performed by: INTERNAL MEDICINE

## 2021-03-18 PROCEDURE — 80053 COMPREHEN METABOLIC PANEL: CPT | Performed by: INTERNAL MEDICINE

## 2021-03-18 PROCEDURE — 36415 COLL VENOUS BLD VENIPUNCTURE: CPT

## 2021-03-18 PROCEDURE — 83615 LACTATE (LD) (LDH) ENZYME: CPT | Performed by: INTERNAL MEDICINE

## 2021-03-18 PROCEDURE — 85025 COMPLETE CBC W/AUTO DIFF WBC: CPT | Performed by: INTERNAL MEDICINE

## 2021-03-18 PROCEDURE — 83010 ASSAY OF HAPTOGLOBIN QUANT: CPT | Performed by: INTERNAL MEDICINE

## 2021-03-19 LAB
HAPTOGLOB SERPL-MCNC: <3 MG/DL (ref 32–197)
IGA SERPL-MCNC: <2 MG/DL (ref 84–499)
IGG SERPL-MCNC: 625 MG/DL (ref 610–1616)
IGM SERPL-MCNC: 15 MG/DL (ref 35–242)

## 2021-03-22 ENCOUNTER — PATIENT OUTREACH (OUTPATIENT)
Dept: ONCOLOGY | Facility: CLINIC | Age: 81
End: 2021-03-22

## 2021-03-22 NOTE — PROGRESS NOTES
Sabina Boyd MD  You 38 minutes ago (10:53 AM)     No, it should be fine.    Message text       You  Sabina Boyd MD 46 minutes ago (10:44 AM)     Any issues with getting IVIG right before cataract surgery?    Message text       Sabina Boyd MD  You 1 hour ago (10:14 AM)     Yes, I m okay with moving up to 8 weeks    Message text          Writer called Tricia. Per Dr. Boyd, ok to move IVIG to every 8 weeks, and ok to get it right before cataract surgery. Tricia verbalized understanding and is in agreement with the plan. Writer forwarded her call to  schedulers to reschedule her labs and possible IVIG appts.     Anabell Hawkins, RN, BSN, COLLEEN  RN Care Coordinator  [facility]  [phone]

## 2021-03-22 NOTE — PROGRESS NOTES
Oliver, MD Veronique Antonio Valerie, RN;  Cancer Clinic Rn Pool 53 minutes ago (8:54 AM)     Okay, thank you.  She can just get her labs done next as scheduled with possible IVIG.    Message text        Writer called Tricia and updated her that the 3/24 appt is canceled since her IgG level is too high.     She is wondering if she could go 8 weeks instead of 12 weeks. Her last IVIG infusion was 02/10/21. If she waits until her 05/05/21 IVIG infusion, it will be 12 weeks.     She also reports she is scheduled for cataract surgery 04/06/21 and 04/20/21, with a check up on 04/07/21.     She is wondering if the labs and IVIG could get moved up.     Writer will reach out to Dr. Boyd for recommendations.     Anabell Hawkins, RN, BSN, COLLEEN  RN Care Coordinator  [facility]  [phone]

## 2021-03-22 NOTE — PROGRESS NOTES
Received call from pt who states she is scheduled for IVIG on 3/24/2021. [facility] and medicare will not cover as it needs to be below 600 per pt.  Pt states her infusion for 3/24 will need to be cancelled.    Informed pt that writer will update Dr Boyd for further recommendations with next infusion and timing and then call pt back with plan.  Pt aware.    Tanya Piper RN, BSN, OCN

## 2021-03-30 ENCOUNTER — OFFICE VISIT (OUTPATIENT)
Dept: INTERNAL MEDICINE | Facility: CLINIC | Age: 81
End: 2021-03-30
Payer: MEDICARE

## 2021-03-30 VITALS
OXYGEN SATURATION: 98 % | HEART RATE: 86 BPM | TEMPERATURE: 98.4 F | DIASTOLIC BLOOD PRESSURE: 57 MMHG | SYSTOLIC BLOOD PRESSURE: 129 MMHG | WEIGHT: 158 LBS | RESPIRATION RATE: 18 BRPM | BODY MASS INDEX: 21.43 KG/M2

## 2021-03-30 DIAGNOSIS — H25.9 AGE-RELATED CATARACT OF BOTH EYES, UNSPECIFIED AGE-RELATED CATARACT TYPE: ICD-10-CM

## 2021-03-30 DIAGNOSIS — Z01.818 PREOP EXAMINATION: Primary | ICD-10-CM

## 2021-03-30 PROCEDURE — 99214 OFFICE O/P EST MOD 30 MIN: CPT | Mod: CS | Performed by: INTERNAL MEDICINE

## 2021-03-30 NOTE — PROGRESS NOTES
[place] [address]  [facility] [address]  Phone: [phone]  Primary Provider: Jeri Church  Pre-op Performing Provider: JERI CHURCH      PREOPERATIVE EVALUATION:  Today's date: 3/30/2021    Tricia Sosa is a 80 year old female who presents for a preoperative evaluation.    Surgical Information:  Surgery/Procedure: Cataract of both eyes  Surgery Location: [facility]  Surgeon: Dr Gillis  Surgery Date: Right eye 04/06/2021 Left eye 04/20/2021  Time of Surgery: Right eye 10:30 Left eye 12:30  Where patient plans to recover: At home with family  Fax number for surgical facility: [phone]    Type of Anesthesia Anticipated: TBD    Assessment & Plan     The proposed surgical procedure is considered LOW risk.    Preop examination    - Asymptomatic COVID-19 Virus (Coronavirus) by PCR; Future  - Asymptomatic COVID-19 Virus (Coronavirus) by PCR; Future    Age-related cataract of both eyes, unspecified age-related cataract type             Risks and Recommendations:  The patient has the following additional risks and recommendations for perioperative complications:   - No identified additional risk factors other than previously addressed    Medication Instructions:  take only thyroid    RECOMMENDATION:  APPROVAL GIVEN to proceed with proposed procedure, without further diagnostic evaluation.    [id number]}    Subjective     HPI related to upcoming procedure: she has cataracts both eyes affecting vision    Preop Questions 3/30/2021   1. Have you ever had a heart attack or stroke? No   2. Have you ever had surgery on your heart or blood vessels, such as a stent placement, a coronary artery bypass, or surgery on an artery in your head, neck, heart, or legs? No   3. Do you have chest pain with activity? No   4. Do you have a history of  heart failure? No   5. Do you currently have a cold, bronchitis or symptoms of other infection? No   6. Do you have a cough, shortness of  breath, or wheezing? No   7. Do you or anyone in your family have previous history of blood clots? No   8. Do you or does anyone in your family have a serious bleeding problem such as prolonged bleeding following surgeries or cuts? no   9. Have you ever had problems with anemia or been told to take iron pills? YES - known hemolytic anemia, controlled    10. Have you had any abnormal blood loss such as black, tarry or bloody stools, or abnormal vaginal bleeding? No   11. Have you ever had a blood transfusion? No   12. Are you willing to have a blood transfusion if it is medically needed before, during, or after your surgery? Yes   13. Have you or any of your relatives ever had problems with anesthesia? no   14. Do you have sleep apnea, excessive snoring or daytime drowsiness? YES - known mild MARIE   14a. Do you have a CPAP machine? Yes   15. Do you have any artifical heart valves or other implanted medical devices like a pacemaker, defibrillator, or continuous glucose monitor? No   16. Do you have artificial joints? No   17. Are you allergic to latex? No       Health Care Directive:  Patient does not have a Health Care Directive or Living Will:     Preoperative Review of :   reviewed - no record of controlled substances prescribed.      Status of Chronic Conditions:  See problem list for active medical problems.  Problems all longstanding and stable, except as noted/documented.  See ROS for pertinent symptoms related to these conditions.      Review of Systems  GENERAL: negative for, fever, chills, weight loss, weight gain  EYES: cataracts  ENT: negative  RESPIRATORY: No dyspnea on exertion and No cough  CARDIOVASCULAR: negative for, palpitations, tachycardia, irregular heart beat and chest pain  GI: negative for, nausea, vomiting, abdominal pain, melena and hematochezia  : negative  MUSCULOSKELETAL: back pain  NEUROLOGIC: positive for recent ocular migraines, negative for, headaches, seizures, local weakness,  numbness or tingling of hands and numbness or tingling of feet  SKIN: negative  ENDOCRINE: negative  Hematology: no bleeding, bruises easily         Patient Active Problem List    Diagnosis Date Noted     Other autoimmune hemolytic anemias 10/21/2020     Priority: Medium     Mild obstructive sleep apnea 11/24/2019     Priority: Medium     Right-sided low back pain with right-sided sciatica, unspecified chronicity 11/16/2017     Priority: Medium     Other autoimmune hemolytic anemias 09/21/2017     Priority: Medium     IMO Regulatory Load OCT 2020       Myopia of both eyes with astigmatism and presbyopia 08/16/2017     Priority: Medium     Nuclear senile cataract of both eyes 08/16/2017     Priority: Medium     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       CARDIOVASCULAR SCREENING; LDL GOAL LESS THAN 130 08/03/2015     Priority: Medium     Autoimmune hemolytic anemia 07/31/2015     Priority: Medium     CVID (common variable immunodeficiency) (H) 04/10/2015     Priority: Medium     Was seen at [facility]-- Dr. Estevez.         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 is ill.       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, and posterior vitreous detachment     Urinary tract infection, site not specified     Recurrent UTI's     Past Surgical History:   Procedure Laterality Date     C LIGATE FALLOPIAN TUBE       COLONOSCOPY N/A 4/26/2016    Procedure: COLONOSCOPY;  Surgeon: Dontae Del Rio MD;  Location: [facility]      COLONOSCOPY W BIOPSY  4/26/00     HC COLONOSCOPY W BIOPSY  10/8/03     HC COLONOSCOPY W BIOPSY  6/27/05    REPEAT IN 5 YEARS      CYSTOSCOPY,INSERT URETERAL STENT      Ureteral stent insertion      FLEX SIGMOIDOSCOPY W BIOPSY  5/30/00      LAPAROSCOPY, SURGICAL; CHOLECYSTECTOMY  1992      THYROIDECTOMY       LIGATION OF HEMORRHOID(S)      Hemorrhoidectomy     UPPER GI ENDOSCOPY,BIOPSY  10/01/01     Current Outpatient Medications   Medication Sig Dispense Refill     cyanocobalamin (VITAMIN  B-12) 1000 MCG tablet   3     folic acid (FOLVITE) 1 MG tablet   3     hydrochlorothiazide (HYDRODIURIL) 12.5 MG tablet TAKE ONE TABLET BY MOUTH ONE TIME DAILY  30 tablet 0     hydrocortisone butyrate (LOCOID) 0.1 % SOLN solution Apply to scalp bid prn 60 mL 11     ketoconazole (NIZORAL) 2 % external shampoo Use every other day to scalp as needed 120 mL 11     levothyroxine (SYNTHROID/LEVOTHROID) 150 MCG tablet Take 1 tablet (150 mcg) by mouth daily 90 tablet 3     sulfamethoxazole-trimethoprim (BACTRIM DS) 800-160 MG tablet Take 1 pill orally on Mon, Wed and Friday 45 tablet 3     triamcinolone (KENALOG) 0.1 % external cream         UNABLE TO FIND privigen inj monthly         Allergies   Allergen Reactions     Doxycycline         Social History     Tobacco Use     Smoking status: Never Smoker     Smokeless tobacco: Never Used   Substance Use Topics     Alcohol use: No     Alcohol/week: 0.0 standard drinks     Frequency: Never       History   Drug Use No         Objective     /57 (BP Location: Left arm, Patient Position: Sitting, Cuff Size: Adult Regular)   Pulse 86   Temp 98.4  F (36.9  C) (Oral)   Resp 18   Wt 71.7 kg (158 lb)   LMP  (LMP Unknown)   SpO2 98%   BMI 21.43 kg/m      Physical Exam    HEENT: PERRL, EOMI, TM's are normal.   NECK: No lymphadenopathy or thyromegaly. Carotid pulses full without bruits.  LUNGS: clear  CV: normal S1, S2 without murmur, S3 or S4 present. Pulses are 2/2 throughout. No JVD.  ABDOMEN:  nontender without hepatosplenomegaly. Mo masses   EXTREMITIES: no edema present, unremarkable joints  NEUROLOGIC: Cranial nerves II-XII intact, reflexes 2/4 throughout,       Recent Labs   Lab Test 03/18/21  1042 02/04/21  1027   HGB 9.9* 10.7*    196    138   POTASSIUM 4.1 3.7   CR 0.73 0.70        Diagnostics:  COVID testing is pending prior to each surgery. Results will be faxed.   No labs were ordered during this visit.   No EKG required for low risk surgery (cataract, skin procedure, breast biopsy, etc).    Revised Cardiac Risk Index (RCRI):  The patient has the following serious cardiovascular risks for perioperative complications:   - No serious cardiac risks = 0 points     RCRI Interpretation: 0 points: Class I (very low risk - 0.4% complication rate)           Signed Electronically by: Emily Church MD  Copy of this evaluation report is provided to requesting physician.

## 2021-03-30 NOTE — NURSING NOTE
/57 (BP Location: Left arm, Patient Position: Sitting, Cuff Size: Adult Regular)   Pulse 86   Temp 98.4  F (36.9  C) (Oral)   Resp 18   Wt 71.7 kg (158 lb)   LMP  (LMP Unknown)   SpO2 98%   BMI 21.43 kg/m

## 2021-04-02 ENCOUNTER — HOSPITAL ENCOUNTER (OUTPATIENT)
Dept: LAB | Facility: CLINIC | Age: 81
Discharge: HOME | End: 2021-04-02
Attending: INTERNAL MEDICINE | Admitting: INTERNAL MEDICINE
Payer: MEDICARE

## 2021-04-02 DIAGNOSIS — Z01.818 PREOP EXAMINATION: ICD-10-CM

## 2021-04-02 LAB
LABORATORY COMMENT REPORT: NORMAL
SARS-COV-2 RNA RESP QL NAA+PROBE: NEGATIVE
SARS-COV-2 RNA RESP QL NAA+PROBE: NORMAL
SPECIMEN SOURCE: NORMAL
SPECIMEN SOURCE: NORMAL

## 2021-04-02 PROCEDURE — U0005 INFEC AGEN DETEC AMPLI PROBE: HCPCS | Performed by: INTERNAL MEDICINE

## 2021-04-02 PROCEDURE — U0003 INFECTIOUS AGENT DETECTION BY NUCLEIC ACID (DNA OR RNA); SEVERE ACUTE RESPIRATORY SYNDROME CORONAVIRUS 2 (SARS-COV-2) (CORONAVIRUS DISEASE [COVID-19]), AMPLIFIED PROBE TECHNIQUE, MAKING USE OF HIGH THROUGHPUT TECHNOLOGIES AS DESCRIBED BY CMS-2020-01-R: HCPCS | Performed by: INTERNAL MEDICINE

## 2021-04-08 ENCOUNTER — HOSPITAL ENCOUNTER (OUTPATIENT)
Facility: CLINIC | Age: 81
Setting detail: SPECIMEN
Discharge: HOME | End: 2021-04-08
Attending: INTERNAL MEDICINE | Admitting: INTERNAL MEDICINE
Payer: MEDICARE

## 2021-04-08 DIAGNOSIS — D59.19 OTHER AUTOIMMUNE HEMOLYTIC ANEMIA (H): ICD-10-CM

## 2021-04-08 LAB
ALBUMIN SERPL-MCNC: 3.9 G/DL (ref 3.4–5)
ALP SERPL-CCNC: 117 U/L (ref 40–150)
ALT SERPL W P-5'-P-CCNC: 24 U/L (ref 0–50)
ANION GAP SERPL CALCULATED.3IONS-SCNC: 2 MMOL/L (ref 3–14)
AST SERPL W P-5'-P-CCNC: 31 U/L (ref 0–45)
BASOPHILS # BLD AUTO: 0 10E9/L (ref 0–0.2)
BASOPHILS NFR BLD AUTO: 0.4 %
BILIRUB SERPL-MCNC: 1.3 MG/DL (ref 0.2–1.3)
BUN SERPL-MCNC: 11 MG/DL (ref 7–30)
CALCIUM SERPL-MCNC: 8.2 MG/DL (ref 8.5–10.1)
CHLORIDE SERPL-SCNC: 107 MMOL/L (ref 94–109)
CO2 SERPL-SCNC: 29 MMOL/L (ref 20–32)
CREAT SERPL-MCNC: 0.77 MG/DL (ref 0.52–1.04)
DIFFERENTIAL METHOD BLD: ABNORMAL
EOSINOPHIL # BLD AUTO: 0.1 10E9/L (ref 0–0.7)
EOSINOPHIL NFR BLD AUTO: 1.3 %
ERYTHROCYTE [DISTWIDTH] IN BLOOD BY AUTOMATED COUNT: 14.7 % (ref 10–15)
GFR SERPL CREATININE-BSD FRML MDRD: 73 ML/MIN/{1.73_M2}
GLUCOSE SERPL-MCNC: 96 MG/DL (ref 70–99)
HCT VFR BLD AUTO: 31.4 % (ref 35–47)
HGB BLD-MCNC: 9.7 G/DL (ref 11.7–15.7)
IMM GRANULOCYTES # BLD: 0.1 10E9/L (ref 0–0.4)
IMM GRANULOCYTES NFR BLD: 1.1 %
LDH SERPL L TO P-CCNC: 297 U/L (ref 81–234)
LYMPHOCYTES # BLD AUTO: 1.3 10E9/L (ref 0.8–5.3)
LYMPHOCYTES NFR BLD AUTO: 27.8 %
MCH RBC QN AUTO: 29.6 PG (ref 26.5–33)
MCHC RBC AUTO-ENTMCNC: 30.9 G/DL (ref 31.5–36.5)
MCV RBC AUTO: 96 FL (ref 78–100)
MONOCYTES # BLD AUTO: 0.5 10E9/L (ref 0–1.3)
MONOCYTES NFR BLD AUTO: 10.9 %
NEUTROPHILS # BLD AUTO: 2.7 10E9/L (ref 1.6–8.3)
NEUTROPHILS NFR BLD AUTO: 58.5 %
NRBC # BLD AUTO: 0 10*3/UL
NRBC BLD AUTO-RTO: 0 /100
PLATELET # BLD AUTO: 146 10E9/L (ref 150–450)
POTASSIUM SERPL-SCNC: 4 MMOL/L (ref 3.4–5.3)
PROT SERPL-MCNC: 6.3 G/DL (ref 6.8–8.8)
RBC # BLD AUTO: 3.28 10E12/L (ref 3.8–5.2)
RETICS # AUTO: 125.6 10E9/L (ref 25–95)
RETICS/RBC NFR AUTO: 3.8 % (ref 0.5–2)
SODIUM SERPL-SCNC: 138 MMOL/L (ref 133–144)
WBC # BLD AUTO: 4.6 10E9/L (ref 4–11)

## 2021-04-08 PROCEDURE — 82784 ASSAY IGA/IGD/IGG/IGM EACH: CPT | Performed by: INTERNAL MEDICINE

## 2021-04-08 PROCEDURE — 80053 COMPREHEN METABOLIC PANEL: CPT | Performed by: INTERNAL MEDICINE

## 2021-04-08 PROCEDURE — 36415 COLL VENOUS BLD VENIPUNCTURE: CPT

## 2021-04-08 PROCEDURE — 85045 AUTOMATED RETICULOCYTE COUNT: CPT | Performed by: INTERNAL MEDICINE

## 2021-04-08 PROCEDURE — 83010 ASSAY OF HAPTOGLOBIN QUANT: CPT | Performed by: INTERNAL MEDICINE

## 2021-04-08 PROCEDURE — 85025 COMPLETE CBC W/AUTO DIFF WBC: CPT | Performed by: INTERNAL MEDICINE

## 2021-04-08 PROCEDURE — 83615 LACTATE (LD) (LDH) ENZYME: CPT | Performed by: INTERNAL MEDICINE

## 2021-04-08 NOTE — PROGRESS NOTES
Medical Assistant Note:  Tricia Sosa presents today for blood draw.    Patient seen by provider today: No.   present during visit today: Not Applicable.    Concerns: No Concerns.    Procedure:  Labs drawn    Post Assessment:  Labs drawn without difficulty: Yes.    Discharge Plan:  Departure Mode: Ambulatory.    Face to Face Time: 10 min.    Tricia Marsahll CMA

## 2021-04-09 LAB
HAPTOGLOB SERPL-MCNC: <3 MG/DL (ref 32–197)
IGA SERPL-MCNC: <2 MG/DL (ref 84–499)
IGG SERPL-MCNC: 482 MG/DL (ref 610–1616)
IGM SERPL-MCNC: 13 MG/DL (ref 35–242)

## 2021-04-14 ENCOUNTER — INFUSION THERAPY VISIT (OUTPATIENT)
Dept: INFUSION THERAPY | Facility: CLINIC | Age: 81
End: 2021-04-14
Attending: INTERNAL MEDICINE
Payer: MEDICARE

## 2021-04-14 VITALS
HEART RATE: 67 BPM | BODY MASS INDEX: 21.36 KG/M2 | OXYGEN SATURATION: 100 % | TEMPERATURE: 97.7 F | SYSTOLIC BLOOD PRESSURE: 148 MMHG | DIASTOLIC BLOOD PRESSURE: 74 MMHG | WEIGHT: 157.5 LBS

## 2021-04-14 DIAGNOSIS — D59.10 AUTOIMMUNE HEMOLYTIC ANEMIA (H): ICD-10-CM

## 2021-04-14 DIAGNOSIS — D83.9 CVID (COMMON VARIABLE IMMUNODEFICIENCY) (H): Primary | ICD-10-CM

## 2021-04-14 PROCEDURE — 250N000011 HC RX IP 250 OP 636: Performed by: INTERNAL MEDICINE

## 2021-04-14 PROCEDURE — 96365 THER/PROPH/DIAG IV INF INIT: CPT

## 2021-04-14 PROCEDURE — 96366 THER/PROPH/DIAG IV INF ADDON: CPT

## 2021-04-14 PROCEDURE — 96375 TX/PRO/DX INJ NEW DRUG ADDON: CPT

## 2021-04-14 PROCEDURE — 258N000003 HC RX IP 258 OP 636: Performed by: INTERNAL MEDICINE

## 2021-04-14 RX ORDER — HEPARIN SODIUM,PORCINE 10 UNIT/ML
5 VIAL (ML) INTRAVENOUS
Status: CANCELLED | OUTPATIENT
Start: 2021-04-14

## 2021-04-14 RX ORDER — DIPHENHYDRAMINE HCL 25 MG
50 CAPSULE ORAL ONCE
Status: CANCELLED | OUTPATIENT
Start: 2021-04-14

## 2021-04-14 RX ORDER — HEPARIN SODIUM (PORCINE) LOCK FLUSH IV SOLN 100 UNIT/ML 100 UNIT/ML
5 SOLUTION INTRAVENOUS
Status: CANCELLED | OUTPATIENT
Start: 2021-04-14

## 2021-04-14 RX ORDER — ACETAMINOPHEN 325 MG/1
650 TABLET ORAL ONCE
Status: CANCELLED
Start: 2021-04-14

## 2021-04-14 RX ADMIN — HYDROCORTISONE SODIUM SUCCINATE 100 MG: 100 INJECTION, POWDER, FOR SOLUTION INTRAMUSCULAR; INTRAVENOUS at 13:29

## 2021-04-14 RX ADMIN — HUMAN IMMUNOGLOBULIN G 35 G: 20 LIQUID INTRAVENOUS at 13:39

## 2021-04-14 RX ADMIN — SODIUM CHLORIDE 250 ML: 9 INJECTION, SOLUTION INTRAVENOUS at 13:34

## 2021-04-14 ASSESSMENT — PAIN SCALES - GENERAL: PAINLEVEL: NO PAIN (0)

## 2021-04-14 NOTE — PROGRESS NOTES
Infusion Nursing Note:  Maandrew Sosa presents today for IVIG.    Patient seen by provider today: No   present during visit today: Not Applicable.    Note:  on 4/8/2021.  Infusion rate increased by 1ml/kg/Hr.    Intravenous Access:  Peripheral IV placed.    Treatment Conditions:  Biological Infusion Checklist:  ~~~ NOTE: If the patient answers yes to any of the questions below, hold the infusion and contact ordering provider or on-call provider.    1. Have you recently had an elevated temperature, fever, chills, productive cough, coughing for 3 weeks or longer or hemoptysis, abnormal vital signs, night sweats,  chest pain or have you noticed a decrease in your appetite, unexplained weight loss or fatigue? No  2. Do you have any open wounds or new incisions? No  3. Do you have any recent or upcoming hospitalizations, surgeries or dental procedures? Yes, cataract surgery. Per Dr. Boyd note from 3/23/2021 okay to give IVIG.    4. Do you currently have or recently have had any signs of illness or infection or are you on any antibiotics? Yes, Bactrim long-term  5. Have you had any new, sudden or worsening abdominal pain? No  6. Have you or anyone in your household received a live vaccination in the past 4 weeks? Please note:  No live vaccines while on biologic/chemotherapy until 6 months after the last treatment.  Patient can receive the flu vaccine (shot only) and the pneumovax.  It is optimal for the patient to get these vaccines mid cycle, but they can be given at any time as long as it is not on the day of the infusion. No  7. Have you recently been diagnosed with any new nervous system diseases (ie. Multiple sclerosis, Guillain [place], seizures, neurological changes) or cancer diagnosis? No  8. Are you on any form of radiation or chemotherapy? No  9. Are you pregnant or breast feeding or do you have plans of pregnancy in the future? No  10. Have you been having any signs of worsening depression or  suicidal ideations?  (benlysta only) No  11. Have there been any other new onset medical symptoms? No        Post Infusion Assessment:  Patient tolerated infusion without incident.  Site patent and intact, free from redness, edema or discomfort.  No evidence of extravasations.  Access discontinued per protocol.       Discharge Plan:   AVS to patient via MYCHART.  Patient will return 6/9/2021 for next appointment.   Patient discharged in stable condition accompanied by: self.  Departure Mode: Ambulatory.    Roya Ng RN

## 2021-04-16 DIAGNOSIS — Z01.818 PREOP EXAMINATION: ICD-10-CM

## 2021-04-16 LAB
SARS-COV-2 RNA RESP QL NAA+PROBE: NORMAL
SPECIMEN SOURCE: NORMAL

## 2021-04-16 PROCEDURE — U0003 INFECTIOUS AGENT DETECTION BY NUCLEIC ACID (DNA OR RNA); SEVERE ACUTE RESPIRATORY SYNDROME CORONAVIRUS 2 (SARS-COV-2) (CORONAVIRUS DISEASE [COVID-19]), AMPLIFIED PROBE TECHNIQUE, MAKING USE OF HIGH THROUGHPUT TECHNOLOGIES AS DESCRIBED BY CMS-2020-01-R: HCPCS | Performed by: INTERNAL MEDICINE

## 2021-04-16 PROCEDURE — U0005 INFEC AGEN DETEC AMPLI PROBE: HCPCS | Performed by: INTERNAL MEDICINE

## 2021-04-17 LAB
LABORATORY COMMENT REPORT: NORMAL
SARS-COV-2 RNA RESP QL NAA+PROBE: NEGATIVE
SPECIMEN SOURCE: NORMAL

## 2021-05-04 ENCOUNTER — TELEPHONE (OUTPATIENT)
Dept: INTERNAL MEDICINE | Facility: CLINIC | Age: 81
End: 2021-05-04

## 2021-05-04 DIAGNOSIS — H81.10 BENIGN PAROXYSMAL POSITIONAL VERTIGO, UNSPECIFIED LATERALITY: ICD-10-CM

## 2021-05-04 DIAGNOSIS — R42 VERTIGO: Primary | ICD-10-CM

## 2021-05-04 NOTE — TELEPHONE ENCOUNTER
"Pt advised, she said has tried them before and \"didn't really work, but I don't think I did them right\".      She will take referral to [facility].  "

## 2021-05-04 NOTE — TELEPHONE ENCOUNTER
Patient with history of vertigo on and off x15 years.  Sometimes it only lasts 1-2 days and other times it has been ongoing and required treatment.    Currently having vertigo since the evening of 4/29/21.  It is worse when first getting up in the morning.  Able to ambulate with assistance of cane, no falls.  She denies any recent head or sinus congestion, ear symptoms, cough, vision changes, vomiting or fever.        No openings in clinic today and patient would really like to see someone for treatment/maneuver.  Would like referral to [facility].  CT Blancas R.N.

## 2021-05-04 NOTE — TELEPHONE ENCOUNTER
"We usually refer to [place] physical therapy for the dizziness treatment.  Has she been to not other clinic previously?  Has she tried home exercises?  If not, she can google \"Home Epley exercises\" and start with those while waiting for the appointment.  "

## 2021-05-05 ENCOUNTER — HOSPITAL ENCOUNTER (OUTPATIENT)
Dept: PHYSICAL THERAPY | Facility: CLINIC | Age: 81
Setting detail: THERAPIES SERIES
End: 2021-05-05
Attending: INTERNAL MEDICINE
Payer: MEDICARE

## 2021-05-05 DIAGNOSIS — R42 VERTIGO: ICD-10-CM

## 2021-05-05 PROCEDURE — 95992 CANALITH REPOSITIONING PROC: CPT | Mod: GP,GZ | Performed by: PHYSICAL THERAPIST

## 2021-05-05 PROCEDURE — 97162 PT EVAL MOD COMPLEX 30 MIN: CPT | Mod: GP | Performed by: PHYSICAL THERAPIST

## 2021-05-05 NOTE — PROGRESS NOTES
05/05/21 1420   Quick Adds   Quick Adds Certification;Vestibular Eval   Type of Visit Initial OP PT Evaluation   General Information   Start of Care Date 05/05/21   Referring Physician Emily Church MD  (updated order by Victoria Childs MD )   Orders Evaluate and Treat as Indicated   Order Date 05/04/21  (updated for BPPV dx 5/5/21)   Medical Diagnosis vertigo. order updated today with dx Benign paroxysmal positional vertigo, unspecified laterality H81.10    Onset of illness/injury or Date of Surgery 04/29/21   Surgical/Medical history reviewed Yes   Pertinent history of current vestibular problem (include personal factors and/or comorbidities that impact the POC)  Hearing loss  (h/o BPPV)   Hearing Loss Comments gradual loss, no aides   Pertinent history of current problem (include personal factors and/or comorbidities that impact the POC) Patient had onset of vertigo last Thursday when she laid down in bed. She has had BPPV in the past and reports knowing exactly what it was. She has continued to have brief episodes of vertigo with bed mobility, especially moving to the right, when looking up and bending over. She reports her balance is not good at baseline and she would like some rehab for balance training as well. She feels like she has been needing her cane more in the house which she has not needed in the past. She has difficulty walking in the dark. She denies nausea or vomiting. She has h/o LBP which limits her mobility. She also feels weak in her back and legs.    Pertinent Visual History  cataract surgery this spring, near vision is worse since this procedure   Prior level of function comment independent mobility, uses AD intermittently, mostly outdoors. likes to garden. has been more sedentary in recent months.    Current Community Support Family/friend caregiver   Patient role/Employment history Unemployed   [facility]  (lives with son and DIL)   Home/Community  Accessibility Comments using stair lifts on stairs at home.    Current Assistive Devices Standard Cane  (using more frequently in house now, uses outside at baseline)   ADL Devices Shower/Tub Grab Bar;Wall Grab Bar   Patient/Family Goals Statement resolve vertigo and improve balance   Fall Risk Screen   Fall screen completed by PT   Have you fallen 2 or more times in the past year? No  (x 1 on stairs)   Have you fallen and had an injury in the past year? No   Is patient a fall risk? Yes   Fall screen comments due to vertigo   Abuse Screen (yes response referral indicated)   Feels Unsafe at Home or Work/School no   Feels Threatened by Someone no   Does Anyone Try to Keep You From Having Contact with Others or Doing Things Outside Your Home? no   Physical Signs of Abuse Present no   System Outcome Measures   Outcome Measures BPPV   Dizziness Handicap Inventory (score out of 100) A decrease in score by 17.18 or greater indicates a clinically significant change in symptoms. 26   Pain   Patient currently in pain No   Cognitive Status Examination   Orientation orientation to person, place and time   Level of Consciousness alert   Follows Commands and Answers Questions 100% of the time   Personal Safety and Judgment intact   Posture   Posture Forward head position;Protracted shoulders;Kyphosis   Bed Mobility   Bed Mobility Comments independent, moves slowly   Transfer Skills   Transfer Comments independent with assist of arms   Gait   Gait Comments pt is ambulating with SEC, flexed posturing. She has slow gait speed and reaches for walls at times. She is able to climb stairs with railings but prefers to come down backwards, one step at a time.   Sensory Examination   Sensory Perception other (describe)   Sensory Perception Comments pt reports h/o peripheral neuropathy B   Cervicogenic Screen   Neck ROM sufficient for positional testing   Oculomotor Exam   Smooth Pursuit Normal   Saccades Normal   VOR Normal   Rapid Head  Thrust Normal   Infrared Goggle Exam or Frenzel Lense Exam   Vestibular Suppressant in Last 24 Hours? No   Exam completed with Infrared Goggles   Spontaneous Nystagmus Negative   Gaze Evoked Nystagmus Negative   [place]-Hallpike (right) Upbeating R torsional   [place]-Hallpike (right) comments short duration with vertigo. reversal of nystagmus and disequilibrium with RTS.    Jossie-Hallpike (Left) Negative   HSCC Supine Roll Test (Right) Upbeating R torsional   HSCC Supine Roll Test (Right) Comments short duration with vertigo   HSCC Supine Roll Test (Left) Negative   BPPV Canal(s) R Posterior   BPPV Type Canalithasis   Planned Therapy Interventions   Planned Therapy Interventions balance training;gait training;neuromuscular re-education;strengthening;transfer training;other (see comments)   Planned Therapy Interventions Comment CRM   Clinical Impression   Criteria for Skilled Therapeutic Interventions Met yes, treatment indicated   PT Diagnosis s/s BPPV, gait instability   Influenced by the following impairments intermittent vertigo, imbalance, functional LE weakness, postural dysfuntion   Functional limitations due to impairments bed mobility, transfers, stairs, ambulation   Clinical Presentation Evolving/Changing   Clinical Presentation Rationale worsening balance over past year, especially with onset of BPPV   Clinical Decision Making (Complexity) Moderate complexity   Therapy Frequency 2 times/Week   Predicted Duration of Therapy Intervention (days/wks) 8 weeks   Risk & Benefits of therapy have been explained Yes   Patient, Family & other staff in agreement with plan of care Yes   Education Assessment   Barriers to Learning No barriers   GOALS   PT Eval Goals 1;2;3;4   Goal 1   Goal Identifier BPPV   Goal Description Patient will have negative s/s of BPPV for safety with mobility.   Target Date 05/26/21   Goal 2   Goal Identifier DHI   Goal Description Patient will score 0/100 on DHI indicating resolution of dizziness for  greater tolerance and confidence with mobility.   Target Date 05/26/21   Goal 3   Goal Identifier Gait stability   Goal Description Patient will have improved gait stability to be able to perform DGI with score of at least 20/24 indicating ability to safely walk about her home without AD and reduced fall risk.   Target Date 06/30/21   Goal 4   Goal Identifier HEP   Goal Description Patient will be indep in strengthening and balance program to continue working towards optimal mobility following discharge from therapy.    Target Date 06/30/21   Total Evaluation Time   PT Eval, Moderate Complexity Minutes (65531) 40   Therapy Certification   Certification date from 05/05/21   Certification date to 06/30/21   Medical Diagnosis Vertigo, Benign paroxysmal positional vertigo, unspecified laterality H81.10

## 2021-05-05 NOTE — TELEPHONE ENCOUNTER
Referral generated yesterday for patient to be seen by  Rehab for vertigo.  Per Medicare guidelines the referral must have a diagnosis of BPPV for them to treat patient.  She is there now.  Susan from rehab ([phone]) wants to know if partner would resend referral with BPPV as diagnosis.  CT Blancas R.N.

## 2021-05-05 NOTE — PROGRESS NOTES
[facility]        OUTPATIENT PHYSICAL THERAPY FUNCTIONAL EVALUATION  PLAN OF TREATMENT FOR OUTPATIENT REHABILITATION  (COMPLETE FOR INITIAL CLAIMS ONLY)  Patient's Last Name, First Name, M.I.  YOB: 1940  Tricia Sosa  JUNIOR     Provider's Name   [facility]   Medical Record No.  [id number]     Start of Care Date:  05/05/21   Onset Date:  04/29/21   Type:     _X__PT   ____OT  ____SLP Medical Diagnosis:  Vertigo, Benign paroxysmal positional vertigo, unspecified laterality H81.10      PT Diagnosis:  s/s BPPV, gait instability Visits from SOC:  1                              __________________________________________________________________________________  Plan of Treatment/Functional Goals:  balance training, gait training, neuromuscular re-education, strengthening, transfer training, other (see comments)  CRM        GOALS  BPPV  Patient will have negative s/s of BPPV for safety with mobility.  05/26/21    DHI  Patient will score 0/100 on DHI indicating resolution of dizziness for greater tolerance and confidence with mobility.  05/26/21    Gait stability  Patient will have improved gait stability to be able to perform DGI with score of at least 20/24 indicating ability to safely walk about her home without AD and reduced fall risk.  06/30/21    HEP  Patient will be indep in strengthening and balance program to continue working towards optimal mobility following discharge from therapy.   06/30/21       Therapy Frequency:  2 times/Week   Predicted Duration of Therapy Intervention:  8 weeks    Montserrat May, PT                                    I CERTIFY THE NEED FOR THESE SERVICES FURNISHED UNDER        THIS PLAN OF TREATMENT AND WHILE UNDER MY CARE     (Physician co-signature of this document indicates review and certification of the therapy plan).                 Certification Date From:  05/05/21   Certification Date To:  06/30/21    Referring Provider:  Emily Church MD(updated order by Victoria Childs MD )    Initial Assessment  See Epic Evaluation- Start of Care Date: 05/05/21

## 2021-05-06 ENCOUNTER — TELEPHONE (OUTPATIENT)
Dept: INTERNAL MEDICINE | Facility: CLINIC | Age: 81
End: 2021-05-06

## 2021-05-06 DIAGNOSIS — E03.9 HYPOTHYROIDISM, UNSPECIFIED TYPE: ICD-10-CM

## 2021-05-07 RX ORDER — LEVOTHYROXINE SODIUM 150 UG/1
150 TABLET ORAL DAILY
Qty: 90 TABLET | Refills: 0 | Status: SHIPPED | OUTPATIENT
Start: 2021-05-07 | End: 2021-06-22

## 2021-05-07 NOTE — TELEPHONE ENCOUNTER
Pending Prescriptions:                       Disp   Refills    levothyroxine (SYNTHROID/LEVOTHROID) 150 M*90 tab*0        Sig: Take 1 tablet (150 mcg) by mouth daily    Routing refill request to provider for review/approval because:  TSH   Date Value Ref Range Status   03/16/2020 2.16 0.40 - 4.00 mU/L Final

## 2021-05-07 NOTE — TELEPHONE ENCOUNTER
Please call and advise patient she should be scheduling a wellness visit soon with lab.  I did send 1 refill.

## 2021-05-11 ENCOUNTER — HOSPITAL ENCOUNTER (OUTPATIENT)
Dept: PHYSICAL THERAPY | Facility: CLINIC | Age: 81
Setting detail: THERAPIES SERIES
End: 2021-05-11
Attending: INTERNAL MEDICINE
Payer: MEDICARE

## 2021-05-11 PROCEDURE — 97110 THERAPEUTIC EXERCISES: CPT | Mod: GP | Performed by: PHYSICAL THERAPIST

## 2021-05-11 PROCEDURE — 97750 PHYSICAL PERFORMANCE TEST: CPT | Mod: GP | Performed by: PHYSICAL THERAPIST

## 2021-05-11 NOTE — PROGRESS NOTES
05/11/21 1000   Signing Clinician's Name / Credentials   Signing clinician's name / credentials Montserrat May PT   Dynamic Gait Index (Lorenzo and Ayala DeSoto, 1995)   Gait Level Surface 1   Change in Gait Speed 2   Gait and Horizontal Head Turns 0   Gait with Vertical Head Turns 1   Gait and Pivot Turns 2   Step Over Obstacle 2   Step Around Obstacles 2   Steps 2   Total Dynamic Gait Index Score  (A score of 19 or less has been correlated to an increased risk of falls in community dwelling older adults, patients with vestibular disorders, and patients with MS.)   Total Score (out of 24) 12   Dynamic Gait Index (DGI):The DGI is a measure of balance during gait that is reliable and valid for the elderly and individuals with Parkinson's disease, MS, vestibular disorders, or s/p stroke. Gait assistive device used: None    Patient score: 12/24  Scores ?19/24 indicate an increased risk for falls according to Kylee et al 2000  Minimal Detectable Change = 2.9 in community dwelling elderly according to Cortes et al 2011    Assessment (rationale for performing, application to patient s function & care plan): Assessment of gait stability following successful resolution of BPPV. Patient's score indicates increased fall risk. She will benefit from skilled PT for safe and effective progress towards mobility goals with reduced fall risk.    Minutes billed as physical performance test:10

## 2021-05-13 ENCOUNTER — HOSPITAL ENCOUNTER (OUTPATIENT)
Dept: PHYSICAL THERAPY | Facility: CLINIC | Age: 81
Setting detail: THERAPIES SERIES
End: 2021-05-13
Attending: INTERNAL MEDICINE
Payer: MEDICARE

## 2021-05-13 PROCEDURE — 97110 THERAPEUTIC EXERCISES: CPT | Mod: GP | Performed by: PHYSICAL THERAPIST

## 2021-05-18 NOTE — TELEPHONE ENCOUNTER
Patient advised and said she will be having an appointment with infusion on 6/3/21 so she is wondering if she should do the lab work for Dr Church at that time or closer to the end of 3 months from now. The patient said Dr Church told her at the preop that she should do her wellness in about September.

## 2021-05-19 ENCOUNTER — HOSPITAL ENCOUNTER (OUTPATIENT)
Dept: PHYSICAL THERAPY | Facility: CLINIC | Age: 81
Setting detail: THERAPIES SERIES
End: 2021-05-19
Attending: INTERNAL MEDICINE
Payer: MEDICARE

## 2021-05-19 PROCEDURE — 97110 THERAPEUTIC EXERCISES: CPT | Mod: GP | Performed by: PHYSICAL THERAPIST

## 2021-05-20 ENCOUNTER — HOSPITAL ENCOUNTER (OUTPATIENT)
Dept: PHYSICAL THERAPY | Facility: CLINIC | Age: 81
Setting detail: THERAPIES SERIES
End: 2021-05-20
Attending: INTERNAL MEDICINE
Payer: MEDICARE

## 2021-05-20 PROCEDURE — 97112 NEUROMUSCULAR REEDUCATION: CPT | Mod: GP | Performed by: PHYSICAL THERAPIST

## 2021-05-20 PROCEDURE — 97110 THERAPEUTIC EXERCISES: CPT | Mod: GP | Performed by: PHYSICAL THERAPIST

## 2021-05-25 ENCOUNTER — HOSPITAL ENCOUNTER (OUTPATIENT)
Dept: MAMMOGRAPHY | Facility: CLINIC | Age: 81
Discharge: HOME | End: 2021-05-25
Attending: INTERNAL MEDICINE | Admitting: INTERNAL MEDICINE
Payer: MEDICARE

## 2021-05-25 ENCOUNTER — HOSPITAL ENCOUNTER (OUTPATIENT)
Dept: PHYSICAL THERAPY | Facility: CLINIC | Age: 81
Setting detail: THERAPIES SERIES
End: 2021-05-25
Attending: INTERNAL MEDICINE
Payer: MEDICARE

## 2021-05-25 DIAGNOSIS — Z12.31 VISIT FOR SCREENING MAMMOGRAM: ICD-10-CM

## 2021-05-25 PROCEDURE — 77063 BREAST TOMOSYNTHESIS BI: CPT

## 2021-05-25 PROCEDURE — 97112 NEUROMUSCULAR REEDUCATION: CPT | Mod: GP | Performed by: PHYSICAL THERAPIST

## 2021-05-25 PROCEDURE — 97110 THERAPEUTIC EXERCISES: CPT | Mod: GP | Performed by: PHYSICAL THERAPIST

## 2021-05-27 ENCOUNTER — HOSPITAL ENCOUNTER (OUTPATIENT)
Dept: PHYSICAL THERAPY | Facility: CLINIC | Age: 81
Setting detail: THERAPIES SERIES
End: 2021-05-27
Attending: INTERNAL MEDICINE
Payer: MEDICARE

## 2021-05-27 DIAGNOSIS — D83.9 CVID (COMMON VARIABLE IMMUNODEFICIENCY) (H): ICD-10-CM

## 2021-05-27 DIAGNOSIS — D59.19 OTHER AUTOIMMUNE HEMOLYTIC ANEMIA (H): Primary | ICD-10-CM

## 2021-05-27 PROCEDURE — 97110 THERAPEUTIC EXERCISES: CPT | Mod: GP | Performed by: PHYSICAL THERAPIST

## 2021-05-27 PROCEDURE — 97112 NEUROMUSCULAR REEDUCATION: CPT | Mod: GP | Performed by: PHYSICAL THERAPIST

## 2021-06-01 ENCOUNTER — HOSPITAL ENCOUNTER (OUTPATIENT)
Dept: PHYSICAL THERAPY | Facility: CLINIC | Age: 81
Setting detail: THERAPIES SERIES
End: 2021-06-01
Attending: INTERNAL MEDICINE
Payer: MEDICARE

## 2021-06-01 PROCEDURE — 95992 CANALITH REPOSITIONING PROC: CPT | Mod: GP,GZ | Performed by: PHYSICAL THERAPIST

## 2021-06-03 ENCOUNTER — HOSPITAL ENCOUNTER (OUTPATIENT)
Facility: CLINIC | Age: 81
Setting detail: SPECIMEN
Discharge: HOME | End: 2021-06-03
Attending: INTERNAL MEDICINE | Admitting: INTERNAL MEDICINE
Payer: MEDICARE

## 2021-06-03 ENCOUNTER — PATIENT OUTREACH (OUTPATIENT)
Dept: ONCOLOGY | Facility: CLINIC | Age: 81
End: 2021-06-03

## 2021-06-03 DIAGNOSIS — D83.9 CVID (COMMON VARIABLE IMMUNODEFICIENCY) (H): ICD-10-CM

## 2021-06-03 DIAGNOSIS — D59.19 OTHER AUTOIMMUNE HEMOLYTIC ANEMIA (H): ICD-10-CM

## 2021-06-03 LAB
ALBUMIN SERPL-MCNC: 4 G/DL (ref 3.4–5)
ALP SERPL-CCNC: 103 U/L (ref 40–150)
ALT SERPL W P-5'-P-CCNC: 22 U/L (ref 0–50)
ANION GAP SERPL CALCULATED.3IONS-SCNC: 2 MMOL/L (ref 3–14)
AST SERPL W P-5'-P-CCNC: 44 U/L (ref 0–45)
BASOPHILS # BLD AUTO: 0 10E9/L (ref 0–0.2)
BASOPHILS NFR BLD AUTO: 0.2 %
BILIRUB SERPL-MCNC: 2 MG/DL (ref 0.2–1.3)
BUN SERPL-MCNC: 12 MG/DL (ref 7–30)
CALCIUM SERPL-MCNC: 8.4 MG/DL (ref 8.5–10.1)
CHLORIDE SERPL-SCNC: 108 MMOL/L (ref 94–109)
CO2 SERPL-SCNC: 29 MMOL/L (ref 20–32)
CREAT SERPL-MCNC: 0.77 MG/DL (ref 0.52–1.04)
DIFFERENTIAL METHOD BLD: ABNORMAL
EOSINOPHIL # BLD AUTO: 0 10E9/L (ref 0–0.7)
EOSINOPHIL NFR BLD AUTO: 0.9 %
ERYTHROCYTE [DISTWIDTH] IN BLOOD BY AUTOMATED COUNT: 15.8 % (ref 10–15)
GFR SERPL CREATININE-BSD FRML MDRD: 72 ML/MIN/{1.73_M2}
GLUCOSE SERPL-MCNC: 113 MG/DL (ref 70–99)
HCT VFR BLD AUTO: 27 % (ref 35–47)
HGB BLD-MCNC: 8.5 G/DL (ref 11.7–15.7)
IMM GRANULOCYTES # BLD: 0.1 10E9/L (ref 0–0.4)
IMM GRANULOCYTES NFR BLD: 1.3 %
LDH SERPL L TO P-CCNC: 496 U/L (ref 81–234)
LYMPHOCYTES # BLD AUTO: 1.1 10E9/L (ref 0.8–5.3)
LYMPHOCYTES NFR BLD AUTO: 24.7 %
MCH RBC QN AUTO: 35 PG (ref 26.5–33)
MCHC RBC AUTO-ENTMCNC: 31.5 G/DL (ref 31.5–36.5)
MCV RBC AUTO: 111 FL (ref 78–100)
MONOCYTES # BLD AUTO: 0.6 10E9/L (ref 0–1.3)
MONOCYTES NFR BLD AUTO: 12.3 %
NEUTROPHILS # BLD AUTO: 2.8 10E9/L (ref 1.6–8.3)
NEUTROPHILS NFR BLD AUTO: 60.6 %
NRBC # BLD AUTO: 0 10*3/UL
NRBC BLD AUTO-RTO: 0 /100
PLATELET # BLD AUTO: 130 10E9/L (ref 150–450)
POTASSIUM SERPL-SCNC: 4.4 MMOL/L (ref 3.4–5.3)
PROT SERPL-MCNC: 6.5 G/DL (ref 6.8–8.8)
RBC # BLD AUTO: 2.43 10E12/L (ref 3.8–5.2)
RETICS # AUTO: 242 10E9/L (ref 25–95)
RETICS/RBC NFR AUTO: 10 % (ref 0.5–2)
SODIUM SERPL-SCNC: 139 MMOL/L (ref 133–144)
WBC # BLD AUTO: 4.6 10E9/L (ref 4–11)

## 2021-06-03 PROCEDURE — 82784 ASSAY IGA/IGD/IGG/IGM EACH: CPT | Performed by: INTERNAL MEDICINE

## 2021-06-03 PROCEDURE — 80053 COMPREHEN METABOLIC PANEL: CPT | Performed by: INTERNAL MEDICINE

## 2021-06-03 PROCEDURE — 85045 AUTOMATED RETICULOCYTE COUNT: CPT | Performed by: INTERNAL MEDICINE

## 2021-06-03 PROCEDURE — 36415 COLL VENOUS BLD VENIPUNCTURE: CPT

## 2021-06-03 PROCEDURE — 83615 LACTATE (LD) (LDH) ENZYME: CPT | Performed by: INTERNAL MEDICINE

## 2021-06-03 PROCEDURE — 86769 SARS-COV-2 COVID-19 ANTIBODY: CPT | Performed by: INTERNAL MEDICINE

## 2021-06-03 PROCEDURE — 85025 COMPLETE CBC W/AUTO DIFF WBC: CPT | Performed by: INTERNAL MEDICINE

## 2021-06-03 PROCEDURE — 83010 ASSAY OF HAPTOGLOBIN QUANT: CPT | Performed by: INTERNAL MEDICINE

## 2021-06-03 NOTE — PROGRESS NOTES
Oliver, Sabina Morocho MD  You;  Cancer Clinic Rn Pool 1 hour ago (12:18 PM)     Let's check labs on Tuesday, and if hemoglobin worse, then let's try to give rituximab on Wednesday.       Okay for vestibular therapy from our standpoint.    Message text        Per Dr. Boyd, pt would get both IVIG and rituximab if her labs qualify her. Per infusion charge RN, there is not enough room on 6/9 for pt to receive both infusions. Pt was scheduled for possible rituximab on 6/10, she will also keep her appt 6/9 for IVIG. Labs will be checked on 6/8.     Pt verbalized understanding and is in agreement with the plan.     Anabell Hawkins, RN, BSN, COLLEEN  RN Care Coordinator  [facility]  [phone]

## 2021-06-03 NOTE — PROGRESS NOTES
Sabina Boyd MD   6/3/2021 11:51 AM CDT      Hemoglobin is trending down, with increased reticulocyte count LDH.  She may be hemolyzing again and may need treatment again. I would like to repeat her labs again next week, and if trend continues, we may need to get her back on steroids with or without rituximab.     Can you please let her know?       Writer called and spoke with Tricia. Tricia verbalized understanding. Tricia also reports she has noticed recently that she is more tired than usual and coffee doesn't wake her up in the morning like it usually does.     Tricia denies shortness of breath for the most part. She only notices some SOB when walking up stairs and gets to the top of the stairs.     Per chart review, pt is already scheduled for possible IVIG and labs on 6/8/21.     Tricia also has had two episodes of vertigo in the past month. She started vestibular therapy for this and reports they are doing strength and balance exercises. Tricia asked to make sure Dr. Boyd was ok with her undergoing this therapy.     Writer encouraged pt to call if she has any worsening SOB.    Writer will update Dr. Boyd.     Anabell Hawkins, RN, BSN, COLLEEN  RN Care Coordinator  [facility]  [phone]

## 2021-06-04 LAB
HAPTOGLOB SERPL-MCNC: <3 MG/DL (ref 32–197)
IGA SERPL-MCNC: <2 MG/DL (ref 84–499)
IGG SERPL-MCNC: 557 MG/DL (ref 610–1616)
IGM SERPL-MCNC: 16 MG/DL (ref 35–242)
SARS-COV-2 AB PNL SERPL IA: NEGATIVE
SARS-COV-2 IGG SERPL IA-ACNC: NORMAL

## 2021-06-08 ENCOUNTER — HOSPITAL ENCOUNTER (OUTPATIENT)
Dept: PHYSICAL THERAPY | Facility: CLINIC | Age: 81
Setting detail: THERAPIES SERIES
End: 2021-06-08
Attending: INTERNAL MEDICINE
Payer: MEDICARE

## 2021-06-08 ENCOUNTER — HOSPITAL ENCOUNTER (OUTPATIENT)
Facility: CLINIC | Age: 81
Setting detail: SPECIMEN
Discharge: HOME | End: 2021-06-08
Attending: INTERNAL MEDICINE | Admitting: INTERNAL MEDICINE
Payer: MEDICARE

## 2021-06-08 DIAGNOSIS — D59.19 OTHER AUTOIMMUNE HEMOLYTIC ANEMIA (H): Primary | ICD-10-CM

## 2021-06-08 DIAGNOSIS — D83.9 CVID (COMMON VARIABLE IMMUNODEFICIENCY) (H): ICD-10-CM

## 2021-06-08 LAB
BASOPHILS # BLD AUTO: 0 10E9/L (ref 0–0.2)
BASOPHILS NFR BLD AUTO: 0.2 %
DIFFERENTIAL METHOD BLD: ABNORMAL
EOSINOPHIL # BLD AUTO: 0 10E9/L (ref 0–0.7)
EOSINOPHIL NFR BLD AUTO: 0.7 %
ERYTHROCYTE [DISTWIDTH] IN BLOOD BY AUTOMATED COUNT: 15.4 % (ref 10–15)
HCT VFR BLD AUTO: 26.2 % (ref 35–47)
HGB BLD-MCNC: 7.9 G/DL (ref 11.7–15.7)
IMM GRANULOCYTES # BLD: 0.1 10E9/L (ref 0–0.4)
IMM GRANULOCYTES NFR BLD: 2.7 %
LYMPHOCYTES # BLD AUTO: 0.9 10E9/L (ref 0.8–5.3)
LYMPHOCYTES NFR BLD AUTO: 20.7 %
MCH RBC QN AUTO: 33.5 PG (ref 26.5–33)
MCHC RBC AUTO-ENTMCNC: 30.2 G/DL (ref 31.5–36.5)
MCV RBC AUTO: 111 FL (ref 78–100)
MONOCYTES # BLD AUTO: 0.5 10E9/L (ref 0–1.3)
MONOCYTES NFR BLD AUTO: 10.5 %
NEUTROPHILS # BLD AUTO: 2.9 10E9/L (ref 1.6–8.3)
NEUTROPHILS NFR BLD AUTO: 65.2 %
NRBC # BLD AUTO: 0 10*3/UL
NRBC BLD AUTO-RTO: 0 /100
PLATELET # BLD AUTO: 116 10E9/L (ref 150–450)
RBC # BLD AUTO: 2.36 10E12/L (ref 3.8–5.2)
WBC # BLD AUTO: 4.4 10E9/L (ref 4–11)

## 2021-06-08 PROCEDURE — 85025 COMPLETE CBC W/AUTO DIFF WBC: CPT | Performed by: INTERNAL MEDICINE

## 2021-06-08 PROCEDURE — 97750 PHYSICAL PERFORMANCE TEST: CPT | Mod: GP | Performed by: PHYSICAL THERAPIST

## 2021-06-08 PROCEDURE — 36415 COLL VENOUS BLD VENIPUNCTURE: CPT | Performed by: INTERNAL MEDICINE

## 2021-06-08 PROCEDURE — 36415 COLL VENOUS BLD VENIPUNCTURE: CPT

## 2021-06-08 PROCEDURE — 97112 NEUROMUSCULAR REEDUCATION: CPT | Mod: GP | Performed by: PHYSICAL THERAPIST

## 2021-06-08 RX ORDER — HEPARIN SODIUM,PORCINE 10 UNIT/ML
5 VIAL (ML) INTRAVENOUS
Status: CANCELLED | OUTPATIENT
Start: 2021-12-06

## 2021-06-08 RX ORDER — LORAZEPAM 2 MG/ML
0.5 INJECTION INTRAMUSCULAR EVERY 4 HOURS PRN
Status: CANCELLED
Start: 2021-12-29

## 2021-06-08 RX ORDER — MEPERIDINE HYDROCHLORIDE 25 MG/ML
25 INJECTION INTRAMUSCULAR; INTRAVENOUS; SUBCUTANEOUS EVERY 30 MIN PRN
Status: CANCELLED | OUTPATIENT
Start: 2021-12-20

## 2021-06-08 RX ORDER — LORAZEPAM 2 MG/ML
0.5 INJECTION INTRAMUSCULAR EVERY 4 HOURS PRN
Status: CANCELLED
Start: 2021-12-20

## 2021-06-08 RX ORDER — DIPHENHYDRAMINE HYDROCHLORIDE 50 MG/ML
50 INJECTION INTRAMUSCULAR; INTRAVENOUS
Status: CANCELLED
Start: 2021-12-06

## 2021-06-08 RX ORDER — METHYLPREDNISOLONE SODIUM SUCCINATE 125 MG/2ML
125 INJECTION, POWDER, LYOPHILIZED, FOR SOLUTION INTRAMUSCULAR; INTRAVENOUS
Status: CANCELLED | OUTPATIENT
Start: 2021-12-29

## 2021-06-08 RX ORDER — DIPHENHYDRAMINE HCL 25 MG
50 CAPSULE ORAL ONCE
Status: CANCELLED
Start: 2021-12-13

## 2021-06-08 RX ORDER — METHYLPREDNISOLONE SODIUM SUCCINATE 125 MG/2ML
125 INJECTION, POWDER, LYOPHILIZED, FOR SOLUTION INTRAMUSCULAR; INTRAVENOUS
Status: CANCELLED
Start: 2021-12-29

## 2021-06-08 RX ORDER — MEPERIDINE HYDROCHLORIDE 25 MG/ML
25 INJECTION INTRAMUSCULAR; INTRAVENOUS; SUBCUTANEOUS
Status: CANCELLED | OUTPATIENT
Start: 2021-12-29

## 2021-06-08 RX ORDER — METHYLPREDNISOLONE SODIUM SUCCINATE 125 MG/2ML
125 INJECTION, POWDER, LYOPHILIZED, FOR SOLUTION INTRAMUSCULAR; INTRAVENOUS
Status: CANCELLED | OUTPATIENT
Start: 2021-12-20

## 2021-06-08 RX ORDER — ACETAMINOPHEN 325 MG/1
650 TABLET ORAL ONCE
Status: CANCELLED
Start: 2021-12-13

## 2021-06-08 RX ORDER — MEPERIDINE HYDROCHLORIDE 25 MG/ML
25 INJECTION INTRAMUSCULAR; INTRAVENOUS; SUBCUTANEOUS EVERY 30 MIN PRN
Status: CANCELLED | OUTPATIENT
Start: 2021-12-29

## 2021-06-08 RX ORDER — LORAZEPAM 2 MG/ML
0.5 INJECTION INTRAMUSCULAR EVERY 4 HOURS PRN
Status: CANCELLED
Start: 2021-12-13

## 2021-06-08 RX ORDER — EPINEPHRINE 1 MG/ML
0.3 INJECTION, SOLUTION INTRAMUSCULAR; SUBCUTANEOUS EVERY 5 MIN PRN
Status: CANCELLED | OUTPATIENT
Start: 2021-12-20

## 2021-06-08 RX ORDER — MEPERIDINE HYDROCHLORIDE 25 MG/ML
25 INJECTION INTRAMUSCULAR; INTRAVENOUS; SUBCUTANEOUS
Status: CANCELLED | OUTPATIENT
Start: 2021-12-20

## 2021-06-08 RX ORDER — MEPERIDINE HYDROCHLORIDE 25 MG/ML
25 INJECTION INTRAMUSCULAR; INTRAVENOUS; SUBCUTANEOUS EVERY 30 MIN PRN
Status: CANCELLED | OUTPATIENT
Start: 2021-12-13

## 2021-06-08 RX ORDER — ALBUTEROL SULFATE 90 UG/1
1-2 AEROSOL, METERED RESPIRATORY (INHALATION)
Status: CANCELLED
Start: 2021-12-13

## 2021-06-08 RX ORDER — DIPHENHYDRAMINE HCL 25 MG
50 CAPSULE ORAL ONCE
Status: CANCELLED
Start: 2021-12-27

## 2021-06-08 RX ORDER — NALOXONE HYDROCHLORIDE 0.4 MG/ML
0.2 INJECTION, SOLUTION INTRAMUSCULAR; INTRAVENOUS; SUBCUTANEOUS
Status: CANCELLED | OUTPATIENT
Start: 2021-12-06

## 2021-06-08 RX ORDER — METHYLPREDNISOLONE SODIUM SUCCINATE 125 MG/2ML
125 INJECTION, POWDER, LYOPHILIZED, FOR SOLUTION INTRAMUSCULAR; INTRAVENOUS
Status: CANCELLED
Start: 2021-12-13

## 2021-06-08 RX ORDER — DIPHENHYDRAMINE HYDROCHLORIDE 50 MG/ML
50 INJECTION INTRAMUSCULAR; INTRAVENOUS
Status: CANCELLED
Start: 2021-12-20

## 2021-06-08 RX ORDER — MEPERIDINE HYDROCHLORIDE 25 MG/ML
25 INJECTION INTRAMUSCULAR; INTRAVENOUS; SUBCUTANEOUS
Status: CANCELLED | OUTPATIENT
Start: 2021-12-06

## 2021-06-08 RX ORDER — ALBUTEROL SULFATE 90 UG/1
1-2 AEROSOL, METERED RESPIRATORY (INHALATION)
Status: CANCELLED
Start: 2021-12-20

## 2021-06-08 RX ORDER — ALBUTEROL SULFATE 0.83 MG/ML
2.5 SOLUTION RESPIRATORY (INHALATION)
Status: CANCELLED | OUTPATIENT
Start: 2021-12-06

## 2021-06-08 RX ORDER — HEPARIN SODIUM,PORCINE 10 UNIT/ML
5 VIAL (ML) INTRAVENOUS
Status: CANCELLED | OUTPATIENT
Start: 2021-12-13

## 2021-06-08 RX ORDER — DIPHENHYDRAMINE HCL 25 MG
50 CAPSULE ORAL ONCE
Status: CANCELLED
Start: 2021-12-06

## 2021-06-08 RX ORDER — METHYLPREDNISOLONE SODIUM SUCCINATE 125 MG/2ML
125 INJECTION, POWDER, LYOPHILIZED, FOR SOLUTION INTRAMUSCULAR; INTRAVENOUS
Status: CANCELLED | OUTPATIENT
Start: 2021-12-06

## 2021-06-08 RX ORDER — NALOXONE HYDROCHLORIDE 0.4 MG/ML
0.2 INJECTION, SOLUTION INTRAMUSCULAR; INTRAVENOUS; SUBCUTANEOUS
Status: CANCELLED | OUTPATIENT
Start: 2021-12-29

## 2021-06-08 RX ORDER — HEPARIN SODIUM,PORCINE 10 UNIT/ML
5 VIAL (ML) INTRAVENOUS
Status: CANCELLED | OUTPATIENT
Start: 2021-12-20

## 2021-06-08 RX ORDER — DIPHENHYDRAMINE HYDROCHLORIDE 50 MG/ML
50 INJECTION INTRAMUSCULAR; INTRAVENOUS
Status: CANCELLED
Start: 2021-12-29

## 2021-06-08 RX ORDER — HEPARIN SODIUM (PORCINE) LOCK FLUSH IV SOLN 100 UNIT/ML 100 UNIT/ML
5 SOLUTION INTRAVENOUS
Status: CANCELLED | OUTPATIENT
Start: 2021-12-20

## 2021-06-08 RX ORDER — DIPHENHYDRAMINE HYDROCHLORIDE 50 MG/ML
50 INJECTION INTRAMUSCULAR; INTRAVENOUS
Status: CANCELLED
Start: 2021-12-13

## 2021-06-08 RX ORDER — HEPARIN SODIUM (PORCINE) LOCK FLUSH IV SOLN 100 UNIT/ML 100 UNIT/ML
5 SOLUTION INTRAVENOUS
Status: CANCELLED | OUTPATIENT
Start: 2021-12-29

## 2021-06-08 RX ORDER — EPINEPHRINE 1 MG/ML
0.3 INJECTION, SOLUTION INTRAMUSCULAR; SUBCUTANEOUS EVERY 5 MIN PRN
Status: CANCELLED | OUTPATIENT
Start: 2021-12-13

## 2021-06-08 RX ORDER — METHYLPREDNISOLONE SODIUM SUCCINATE 125 MG/2ML
125 INJECTION, POWDER, LYOPHILIZED, FOR SOLUTION INTRAMUSCULAR; INTRAVENOUS
Status: CANCELLED
Start: 2021-12-20

## 2021-06-08 RX ORDER — EPINEPHRINE 1 MG/ML
0.3 INJECTION, SOLUTION INTRAMUSCULAR; SUBCUTANEOUS EVERY 5 MIN PRN
Status: CANCELLED | OUTPATIENT
Start: 2021-12-06

## 2021-06-08 RX ORDER — HEPARIN SODIUM,PORCINE 10 UNIT/ML
5 VIAL (ML) INTRAVENOUS
Status: CANCELLED | OUTPATIENT
Start: 2021-12-29

## 2021-06-08 RX ORDER — NALOXONE HYDROCHLORIDE 0.4 MG/ML
0.2 INJECTION, SOLUTION INTRAMUSCULAR; INTRAVENOUS; SUBCUTANEOUS
Status: CANCELLED | OUTPATIENT
Start: 2021-12-13

## 2021-06-08 RX ORDER — ACETAMINOPHEN 325 MG/1
650 TABLET ORAL ONCE
Status: CANCELLED
Start: 2021-12-06

## 2021-06-08 RX ORDER — HEPARIN SODIUM (PORCINE) LOCK FLUSH IV SOLN 100 UNIT/ML 100 UNIT/ML
5 SOLUTION INTRAVENOUS
Status: CANCELLED | OUTPATIENT
Start: 2021-12-06

## 2021-06-08 RX ORDER — ALBUTEROL SULFATE 0.83 MG/ML
2.5 SOLUTION RESPIRATORY (INHALATION)
Status: CANCELLED | OUTPATIENT
Start: 2021-12-20

## 2021-06-08 RX ORDER — ALBUTEROL SULFATE 90 UG/1
1-2 AEROSOL, METERED RESPIRATORY (INHALATION)
Status: CANCELLED
Start: 2021-12-29

## 2021-06-08 RX ORDER — PREDNISONE 20 MG/1
60 TABLET ORAL DAILY
Qty: 100 TABLET | Refills: 0 | Status: SHIPPED | OUTPATIENT
Start: 2021-06-08 | End: 2021-07-20

## 2021-06-08 RX ORDER — METHYLPREDNISOLONE SODIUM SUCCINATE 125 MG/2ML
125 INJECTION, POWDER, LYOPHILIZED, FOR SOLUTION INTRAMUSCULAR; INTRAVENOUS
Status: CANCELLED | OUTPATIENT
Start: 2021-12-13

## 2021-06-08 RX ORDER — ACETAMINOPHEN 325 MG/1
650 TABLET ORAL ONCE
Status: CANCELLED
Start: 2021-12-29

## 2021-06-08 RX ORDER — MEPERIDINE HYDROCHLORIDE 25 MG/ML
25 INJECTION INTRAMUSCULAR; INTRAVENOUS; SUBCUTANEOUS
Status: CANCELLED | OUTPATIENT
Start: 2021-12-13

## 2021-06-08 RX ORDER — HEPARIN SODIUM (PORCINE) LOCK FLUSH IV SOLN 100 UNIT/ML 100 UNIT/ML
5 SOLUTION INTRAVENOUS
Status: CANCELLED | OUTPATIENT
Start: 2021-12-13

## 2021-06-08 RX ORDER — ALBUTEROL SULFATE 0.83 MG/ML
2.5 SOLUTION RESPIRATORY (INHALATION)
Status: CANCELLED | OUTPATIENT
Start: 2021-12-13

## 2021-06-08 RX ORDER — EPINEPHRINE 1 MG/ML
0.3 INJECTION, SOLUTION INTRAMUSCULAR; SUBCUTANEOUS EVERY 5 MIN PRN
Status: CANCELLED | OUTPATIENT
Start: 2021-12-29

## 2021-06-08 RX ORDER — ALBUTEROL SULFATE 0.83 MG/ML
2.5 SOLUTION RESPIRATORY (INHALATION)
Status: CANCELLED | OUTPATIENT
Start: 2021-12-29

## 2021-06-08 RX ORDER — ALBUTEROL SULFATE 90 UG/1
1-2 AEROSOL, METERED RESPIRATORY (INHALATION)
Status: CANCELLED
Start: 2021-12-06

## 2021-06-08 RX ORDER — MEPERIDINE HYDROCHLORIDE 25 MG/ML
25 INJECTION INTRAMUSCULAR; INTRAVENOUS; SUBCUTANEOUS EVERY 30 MIN PRN
Status: CANCELLED | OUTPATIENT
Start: 2021-12-06

## 2021-06-08 RX ORDER — METHYLPREDNISOLONE SODIUM SUCCINATE 125 MG/2ML
125 INJECTION, POWDER, LYOPHILIZED, FOR SOLUTION INTRAMUSCULAR; INTRAVENOUS
Status: CANCELLED
Start: 2021-12-06

## 2021-06-08 RX ORDER — LORAZEPAM 2 MG/ML
0.5 INJECTION INTRAMUSCULAR EVERY 4 HOURS PRN
Status: CANCELLED
Start: 2021-12-06

## 2021-06-08 RX ORDER — ACETAMINOPHEN 325 MG/1
650 TABLET ORAL ONCE
Status: CANCELLED
Start: 2021-12-20

## 2021-06-08 RX ORDER — NALOXONE HYDROCHLORIDE 0.4 MG/ML
0.2 INJECTION, SOLUTION INTRAMUSCULAR; INTRAVENOUS; SUBCUTANEOUS
Status: CANCELLED | OUTPATIENT
Start: 2021-12-20

## 2021-06-08 RX ORDER — DIPHENHYDRAMINE HCL 25 MG
50 CAPSULE ORAL ONCE
Status: CANCELLED
Start: 2021-12-20

## 2021-06-09 ENCOUNTER — INFUSION THERAPY VISIT (OUTPATIENT)
Dept: INFUSION THERAPY | Facility: CLINIC | Age: 81
End: 2021-06-09
Attending: INTERNAL MEDICINE
Payer: MEDICARE

## 2021-06-09 ENCOUNTER — PATIENT OUTREACH (OUTPATIENT)
Dept: ONCOLOGY | Facility: CLINIC | Age: 81
End: 2021-06-09

## 2021-06-09 VITALS
WEIGHT: 153.2 LBS | BODY MASS INDEX: 20.78 KG/M2 | TEMPERATURE: 98 F | SYSTOLIC BLOOD PRESSURE: 134 MMHG | DIASTOLIC BLOOD PRESSURE: 62 MMHG | HEART RATE: 67 BPM | OXYGEN SATURATION: 98 %

## 2021-06-09 DIAGNOSIS — D59.10 AUTOIMMUNE HEMOLYTIC ANEMIA (H): ICD-10-CM

## 2021-06-09 DIAGNOSIS — D83.9 CVID (COMMON VARIABLE IMMUNODEFICIENCY) (H): Primary | ICD-10-CM

## 2021-06-09 PROCEDURE — 96365 THER/PROPH/DIAG IV INF INIT: CPT

## 2021-06-09 PROCEDURE — 96375 TX/PRO/DX INJ NEW DRUG ADDON: CPT

## 2021-06-09 PROCEDURE — 250N000011 HC RX IP 250 OP 636: Performed by: INTERNAL MEDICINE

## 2021-06-09 PROCEDURE — 96366 THER/PROPH/DIAG IV INF ADDON: CPT

## 2021-06-09 RX ORDER — ACETAMINOPHEN 325 MG/1
650 TABLET ORAL ONCE
Status: CANCELLED
Start: 2021-07-27

## 2021-06-09 RX ORDER — HEPARIN SODIUM (PORCINE) LOCK FLUSH IV SOLN 100 UNIT/ML 100 UNIT/ML
5 SOLUTION INTRAVENOUS
Status: CANCELLED | OUTPATIENT
Start: 2021-06-09

## 2021-06-09 RX ORDER — HEPARIN SODIUM,PORCINE 10 UNIT/ML
5 VIAL (ML) INTRAVENOUS
Status: CANCELLED | OUTPATIENT
Start: 2021-06-09

## 2021-06-09 RX ORDER — DIPHENHYDRAMINE HCL 25 MG
50 CAPSULE ORAL ONCE
Status: CANCELLED | OUTPATIENT
Start: 2021-06-09

## 2021-06-09 RX ADMIN — HUMAN IMMUNOGLOBULIN G 35 G: 20 LIQUID INTRAVENOUS at 12:58

## 2021-06-09 RX ADMIN — HYDROCORTISONE SODIUM SUCCINATE 100 MG: 100 INJECTION, POWDER, FOR SOLUTION INTRAMUSCULAR; INTRAVENOUS at 12:53

## 2021-06-09 NOTE — PROGRESS NOTES
Oliver, Sabina Morocho MD  You;  Cancer Clinic Rn Pool 34 minutes ago (1:01 PM)     Okay, let's hold the rituximab for now then, and we'll monitor what response she gets from the steroids.   We can still keep the appointment with me tomorrow to discuss.       -Sabina    Message text      Writer spoke with Ally ALONSO RN and discussed Dr. Boyd's recommendations. Appt for rituxan tomorrow will be canceled. MAGGIE Canales to update the patient.     Anabell Hawkins, RN, BSN, DIANNEM  RN Care Coordinator  [facility]  [phone]

## 2021-06-09 NOTE — PROGRESS NOTES
Writer notified by Bushra, , that pt was updated with her rituximab appointments and was not very happy that they had to be scheduled at [facility] because the [facility] [place] [facility] did not have any availability.     Per Bushra, pt threatened to cancel all of her appointments because she refuses to drive to [facility]. She also refused any help from social work to help find transportation.     Writer spoke with Ally PIERSON, infusion RN who has [facility] today for IVIG. Per Tricia Canales is wondering if we could just try the steroids for now and skip the rituxan. Writer will route request to Dr. Boyd and touch base with patient.     Anabell Hawkins, RN, BSN, COLLEEN  RN Care Coordinator  [facility]  [phone]

## 2021-06-09 NOTE — PROGRESS NOTES
Rehabilitation Services      OUTPATIENT PHYSICAL THERAPY  PLAN OF TREATMENT FOR OUTPATIENT REHABILITATION    Patient's Last Name, First Name, M.I.                YOB: 1940  Tricia Sosa                        Provider's Name  Montserrat May, PT Medical Record No.  [id number]                               Onset Date: 04/29/21   Start of Care Date: 05/05/21   Type:     _X_PT   ___OT   ___SLP Medical Diagnosis: Vertigo, Benign paroxysmal positional vertigo, unspecified laterality                       PT Diagnosis: s/s BPPV, gait instability   # visits 9   _________________________________________________________________________________  Plan of Treatment:  balance training, gait training, neuromuscular re-education, strengthening, transfer training, other (see comments)  CRM    Frequency/Duration: 1x/week x 6 weeks     Goals:  Goal Identifier BPPV   Goal Description Patient will have negative s/s of BPPV for safety with mobility.   Target Date 05/26/21   Date Met  05/11/21   Progress: pt had recurrence of BPPV in early June, again resolved following CRM last week.      Goal Identifier DHI   Goal Description Patient will score 0/100 on DHI indicating resolution of dizziness for greater tolerance and confidence with mobility.   Target Date 05/26/21   Date Met  05/11/21   Progress:      Goal Identifier Gait stability   Goal Description Patient will have improved gait stability to be able to perform DGI with score of at least 20/24 indicating ability to safely walk about her home without AD and reduced fall risk.   Target Date 06/30/21   Date Met      Progress: in progress      Goal Identifier HEP   Goal Description Patient will be indep in strengthening and balance program to continue working towards optimal mobility following discharge from therapy.    Target Date 06/30/21   Date Met      Progress: in progress, will  add more for core strength as well to assist in posture         Progress Toward Goals:   Progress this reporting period: Patient is making steady gains in PT. Following resolution of BPPV, gait and postural stability are improving. Core weakness impacts gait and balance as it is hard for patient to hold her posture. Using her cane helps support posture and also improves stability. Recent dx of anemia likely explains patients recent increase in fatigue and SOB which has been a limiting factor in tolerance for activity. Patient will benefit from additional skilled intervention for safe and effective work to further reduce fall risk as well as continue HEP development for strengthening, adding in more core/postural strengthening to help improve stability and balance reactions. Will continue with goals 3 and 4.       Certification date from 6/9/21 to 7/21/21.    Montserrat May, PT          I CERTIFY THE NEED FOR THESE SERVICES FURNISHED UNDER        THIS PLAN OF TREATMENT AND WHILE UNDER MY CARE     (Physician co-signature of this document indicates review and certification of the therapy plan).                Referring Provider: Emily Church MD

## 2021-06-09 NOTE — PROGRESS NOTES
Infusion Nursing Note:  Tricia Sosa presents today for IVIG.    Patient seen by provider today: No   present during visit today: Not Applicable.    Note: Patient started on prednisone yesterday and was supposed to start rituxan tomorrow for a flare of her hemolytic anemia.  However she would like to wait on rituxan to see if prednisone alone will help.  Anabell SERRANOCC checked with Dr. Boyd: ok to cancel rituxan appointment tomorrow but keep appointment with Dr. Boyd.  Changed that appointment from in person to telephone per patient request.    Started IVIG at 0.5ml/kg/hr and increase by 1ml/kg/hr, max rate 4ml/kg/hr.      Intravenous Access:  Peripheral IV placed.    Treatment Conditions:  Biological Infusion Checklist:  ~~~ NOTE: If the patient answers yes to any of the questions below, hold the infusion and contact ordering provider or on-call provider.    1. Have you recently had an elevated temperature, fever, chills, productive cough, coughing for 3 weeks or longer or hemoptysis, abnormal vital signs, night sweats,  chest pain or have you noticed a decrease in your appetite, unexplained weight loss or fatigue? No  2. Do you have any open wounds or new incisions? No  3. Do you have any recent or upcoming hospitalizations, surgeries or dental procedures? No  4. Do you currently have or recently have had any signs of illness or infection or are you on any antibiotics? No, uses long term antibiotics  5. Have you had any new, sudden or worsening abdominal pain? No  6. Have you or anyone in your household received a live vaccination in the past 4 weeks? Please note:  No live vaccines while on biologic/chemotherapy until 6 months after the last treatment.  Patient can receive the flu vaccine (shot only) and the pneumovax.  It is optimal for the patient to get these vaccines mid cycle, but they can be given at any time as long as it is not on the day of the infusion. No  7. Have you recently been diagnosed  with any new nervous system diseases (ie. Multiple sclerosis, Guillain Partridge, seizures, neurological changes) or cancer diagnosis? No  8. Are you on any form of radiation or chemotherapy? No  9. Are you pregnant or breast feeding or do you have plans of pregnancy in the future? No  10. Have you been having any signs of worsening depression or suicidal ideations?  (benlysta only) No  11. Have there been any other new onset medical symptoms? No        Post Infusion Assessment:  Patient tolerated infusion without incident.  Blood return noted pre and post infusion.  Site patent and intact, free from redness, edema or discomfort.  No evidence of extravasations.  Access discontinued per protocol.       Discharge Plan:   AVS to patient via [employer].  Patient will return 6/10/21 with Dr. Boyd for next appointment.   Patient discharged in stable condition accompanied by: self.  Departure Mode: Ambulatory.      Ally Adams RN

## 2021-06-09 NOTE — PROGRESS NOTES
Outpatient Physical Therapy Progress Note     Patient: Tricia Sosa  : 1940    Beginning/End Dates of Reporting Period:  21 to 2021. Tricia has been seen for 9 visits in this period for CRM to treat BPPV, therapeutic exercise for strengthening, balance and gait training.    Referring Provider: Emily Church MD    Therapy Diagnosis: s/s BPPV, gait instability     Client Self Report: Blood work last week revealed she has hemolytic anemia again. Hgb 8. She will need another infusion. Last time was 1 infusion a week x 4 weeks. Has been more SOB and tired related to the anemia. Really would like to continue to work on core strengthening, in addition to balance. Hard to hold posture. Dizziness not as bad this week.    Objective Measurements:  Objective Measure: IR exam  Details: R hallpike - negative s/s BPPV. no nystagmus or report of vertigo    Objective Measure: DGI  Details:  without AD,  with SEC. Improved from eval      Goals:  Goal Identifier BPPV   Goal Description Patient will have negative s/s of BPPV for safety with mobility.   Target Date 21   Date Met  21   Progress: pt had recurrence of BPPV in early , again resolved following CRM last week.     Goal Identifier DHI   Goal Description Patient will score 0/100 on DHI indicating resolution of dizziness for greater tolerance and confidence with mobility.   Target Date 21   Date Met  21   Progress:     Goal Identifier Gait stability   Goal Description Patient will have improved gait stability to be able to perform DGI with score of at least 20/24 indicating ability to safely walk about her home without AD and reduced fall risk.   Target Date 21   Date Met      Progress: in progress     Goal Identifier HEP   Goal Description Patient will be indep in strengthening and balance program to continue working towards optimal mobility following discharge from therapy.    Target Date 21   Date Met       Progress: in progress, will add more for core strength as well to assist in posture       Progress Toward Goals:   Progress this reporting period: Patient is making steady gains in PT. Following resolution of BPPV, gait and postural stability are improving. Core weakness impacts gait and balance as it is hard for patient to hold her posture. Using her cane helps support posture and also improves stability. Recent dx of anemia likely explains patients recent increase in fatigue and SOB which has been a limiting factor in tolerance for activity. Patient will benefit from additional skilled intervention for safe and effective work to further reduce fall risk as well as continue HEP development for strengthening, adding in more core/postural strengthening to help improve stability and balance reactions. Will continue with goals 3 and 4.     Plan:  Continue therapy per POC with reduced frequency to 1x/week.    Discharge:  No

## 2021-06-09 NOTE — PROGRESS NOTES
06/08/21 1022   Signing Clinician's Name / Credentials   Signing clinician's name / credentials Montserrat May PT   Dynamic Gait Index (Lorenzo and Ayala [place], 1995)   Gait Level Surface 2  (with SEC)   Change in Gait Speed 2   Gait and Horizontal Head Turns 2   Gait with Vertical Head Turns 2   Gait and Pivot Turns 2   Step Over Obstacle 3   Step Around Obstacles 3   Steps 2   Total Dynamic Gait Index Score  (A score of 19 or less has been correlated to an increased risk of falls in community dwelling older adults, patients with vestibular disorders, and patients with MS.)   Total Score (out of 24) 18   Dynamic Gait Index (DGI):The DGI is a measure of balance during gait that is reliable and valid for the elderly and individuals with Parkinson's disease, MS, vestibular disorders, or s/p stroke. Gait assistive device used: SEC    Patient score: 18/24  Scores ?19/24 indicate an increased risk for falls according to Kylee et al 2000  Minimal Detectable Change = 2.9 in community dwelling elderly according to Sebastian et al 2011    Assessment (rationale for performing, application to patient s function & care plan): re-assessment of gait stability to measure progress and assist in care planning. Patient scored 18/24 with SEC and 16/24 without an assistive device. Both are improved from initial eval score of 12/24. She is making gain but still at increased fall risk. She will benefit from additional skilled intervention for safe and effective reduction in fall risk.     Minutes billed as physical performance test: 17 min for both assessments with and without AD

## 2021-06-10 ENCOUNTER — VIRTUAL VISIT (OUTPATIENT)
Dept: ONCOLOGY | Facility: CLINIC | Age: 81
End: 2021-06-10
Attending: INTERNAL MEDICINE
Payer: MEDICARE

## 2021-06-10 DIAGNOSIS — D59.19 OTHER AUTOIMMUNE HEMOLYTIC ANEMIA (H): Primary | ICD-10-CM

## 2021-06-10 PROCEDURE — 99443 PR PHYSICIAN TELEPHONE EVALUATION 21-30 MIN: CPT | Mod: 95 | Performed by: INTERNAL MEDICINE

## 2021-06-10 NOTE — LETTER
6/10/2021         RE: Tricia Sosa  [address]  [facility] [address]        Dear Colleague,    Thank you for referring your patient, Tricia Sosa, to the [facility]. Please see a copy of my visit note below.    Tricia is a 80 year old who is being evaluated via a billable telephone visit.      What phone number would you like to be contacted at?    [phone]    How would you like to obtain your AVS? Dax           [facility]    Hematology/Oncology Established Patient Follow-up Note    Today's Date: 6/10/21    Reason for Follow-up: Hemolytic anemia-autoimmune; IgA deficiency    HISTORY OF PRESENT ILLNESS: Tricia Sosa is a 80 year old female who presents with autoimmune hemolytic anemia and IgA deficiency.  She previously saw Dr. Matos and then Dr. Summers.  She was previously seen at [facility].    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  She was very sick at the time and had severe diarrhea.  She lost about 40 pounds in weight.  The workup revealed that she had markedly diminished CD4 counts of 48 and was diagnosed with CMV colitis.  Since then she has been followed in hematology clinic at [facility] until recently.  She was noted to have anemia which was fairly long-standing.  She was initially referred for anemia in 2004 and also had a bone marrow biopsy done at that time which revealed hypercellular bone marrow with 70-80% cellularity and normal trilineage hematopoiesis and no morphologic features of MDS or lymphoproliferation.  Her anemia was attributed to her chronic underlying disease.  At the next evaluation in 2002 on 4 aggressive anemia there was no evidence of hemolysis, iron deficiency, B12 or folate deficiency.  Bone marrow biopsy was not pursued.  In summer of 2015 she had progressive fatigue, dyspnea on exertion and worsening anemia with a hemoglobin now of 9.  Workup at this time  did suggest a hemolytic anemia with elevated LDH at 709, undetectable haptoglobin and elevated unconjugated bilirubin at 3.3.  Monospecific MARIKA was positive for both IgG and complement.  Cold agglutinin screen was positive with very low titer 1:64.  She was started on prednisone 60 mg daily with supplementation of iron folate and B12 starting 7/31/15.  Her prednisone has been slowly tapered and was discontinued off in January 2016.  She was last followed at [facility] ER on March 10, 2016 when she had stable hemoglobin.    She was followed by Dr. Matos and Dr. Summers.  Due to relapse of hemolytic anemia, she underwent another course of prednisone that has since been tapered off and rituximab x 4 weekly doses completed in 9/19/19.    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  She started weekly Rituxan on 7/31/20 x 4 doses, completed on 8/21/20.  She tapered off of prednisone in October 2020.      INTERIM HISTORY: Tricia has felt more tired and short of breath.  Her hemoglobin decreased again, and she did start prednisone again on 6/8/21.        REVIEW OF SYSTEMS:   14 point ROS was reviewed and is negative other than as noted above in HPI.       HOME MEDICATIONS:  Current Outpatient Medications   Medication Sig Dispense Refill     cyanocobalamin (VITAMIN  B-12) 1000 MCG tablet   3     folic acid (FOLVITE) 1 MG tablet   3     hydrochlorothiazide (HYDRODIURIL) 12.5 MG tablet TAKE ONE TABLET BY MOUTH ONE TIME DAILY  30 tablet 0     hydrocortisone butyrate (LOCOID) 0.1 % SOLN solution Apply to scalp bid prn 60 mL 11     ketoconazole (NIZORAL) 2 % external shampoo Use every other day to scalp as needed 120 mL 11     levothyroxine (SYNTHROID/LEVOTHROID) 150 MCG tablet Take 1 tablet (150 mcg) by mouth daily  90 tablet 0     predniSONE (DELTASONE) 20 MG tablet Take 3 tablets (60 mg) by mouth daily 100 tablet 0     sulfamethoxazole-trimethoprim (BACTRIM DS) 800-160 MG  tablet Take 1 pill orally on Mon, Wed and Friday 45 tablet 3     triamcinolone (KENALOG) 0.1 % external cream        UNABLE TO FIND privigen inj every two months           ALLERGIES:  Allergies   Allergen Reactions     Doxycycline          PAST MEDICAL HISTORY:  Past Medical History:   Diagnosis Date     WARD (dyspnea on exertion)      External hemorrhoids without mention of complication 6/27/05     Hemolytic anemia (H)     autoimmune     Hypothyroidism      IgA deficiency (H)      Other malaise and fatigue      Other severe protein-calorie malnutrition      Thyroid disease      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    Hemorrhage Subdural Trauma Without LOC Active     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, and posterior vitreous detachment     Urinary tract infection, site not specified     Recurrent UTI's         PAST SURGICAL HISTORY:  Past Surgical History:   Procedure Laterality Date     C LIGATE FALLOPIAN TUBE       COLONOSCOPY N/A 4/26/2016    Procedure: COLONOSCOPY;  Surgeon: Dontae Del Rio MD;  Location:  GI     EYE SURGERY Bilateral 04/20/2021    Cataract Surgery     HC COLONOSCOPY W BIOPSY  4/26/00     HC COLONOSCOPY W BIOPSY  10/8/03     HC COLONOSCOPY W BIOPSY  6/27/05    REPEAT IN 5 YEARS     HC CYSTOSCOPY,INSERT URETERAL STENT      Ureteral stent insertion     HC FLEX SIGMOIDOSCOPY W BIOPSY  5/30/00     HC LAPAROSCOPY, SURGICAL; CHOLECYSTECTOMY  1992      THYROIDECTOMY       LIGATION OF HEMORRHOID(S)      Hemorrhoidectomy     UPPER GI ENDOSCOPY,BIOPSY  10/01/01         SOCIAL HISTORY:  Social History     Socioeconomic History     Marital status:      Spouse name: Not on file     Number of children: Not on file     Years of education: Not on file     Highest education level: Not on file   Occupational History     Not on file   Social Needs     Financial resource strain: Not on file     Food insecurity     Worry: Not on file     Inability:  Not on file     Transportation needs     Medical: Not on file     Non-medical: Not on file   Tobacco Use     Smoking status: Never Smoker     Smokeless tobacco: Never Used   Substance and Sexual Activity     Alcohol use: No     Alcohol/week: 0.0 standard drinks     Frequency: Never     Drug use: No     Sexual activity: Never   Lifestyle     Physical activity     Days per week: Not on file     Minutes per session: Not on file     Stress: Not on file   Relationships     Social connections     Talks on phone: Not on file     Gets together: Not on file     Attends [employer] service: Not on file     Active member of club or organization: Not on file     Attends meetings of clubs or organizations: Not on file     Relationship status: Not on file     Intimate partner violence     Fear of current or ex partner: Not on file     Emotionally abused: Not on file     Physically abused: Not on file     Forced sexual activity: Not on file   Other Topics Concern     Parent/sibling w/ CABG, MI or angioplasty before 65F 55M? Not Asked   Social History Narrative     Not on file         FAMILY HISTORY:  Family History   Problem Relation Age of Onset     Colon Cancer Mother 90     Cerebrovascular Disease Father          at age 85     Dementia Brother      Dementia Brother      Pancreatic Cancer Brother      Breast Cancer Sister          PHYSICAL EXAM:  Phone visit.      LABS:  CBC RESULTS:   Recent Labs   Lab Test 21  0950   WBC 4.4   RBC 2.36*   HGB 7.9*   HCT 26.2*   *   MCH 33.5*   MCHC 30.2*   RDW 15.4*   *     Recent Labs   Lab Test 21  1011 21  1016    138   POTASSIUM 4.4 4.0   CHLORIDE 108 107   CO2 29 29   ANIONGAP 2* 2*   * 96   BUN 12 11   CR 0.77 0.77   CULLEN 8.4* 8.2*     Lab Results   Component Value Date    AST 44 2021     Lab Results   Component Value Date    ALT 22 2021     No results found for: BILICONJ   Lab Results   Component Value Date    BILITOTAL 2.0  06/03/2021     Lab Results   Component Value Date    ALBUMIN 4.0 06/03/2021     Lab Results   Component Value Date    PROTTOTAL 6.5 06/03/2021      Lab Results   Component Value Date    ALKPHOS 103 06/03/2021     Component      Latest Ref Rng & Units 6/3/2021   IGG      610 - 1,616 mg/dL 557 (L)   IGA      84 - 499 mg/dL <2 (L)   IGM      35 - 242 mg/dL 16 (L)   % Retic      0.5 - 2.0 % 10.0 (H)   Absolute Retic      25 - 95 10e9/L 242.0 (H)   Haptoglobin      32 - 197 mg/dL <3 (L)   Lactate Dehydrogenase      81 - 234 U/L 496 (H)           ASSESSMENT/PLAN:  Tricia Sosa is a 80 year old female with:      1. 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 Common variable immunodeficiency disorder  4. Splenomegaly        1) Autoimmune hemolytic anemia: She had relapse in July 2020, and started on prednisone, as well as weekly rituximab infusions x 4, completed on 8/21/20.  Her hemoglobin slowly improved back up to her baseline and has been stable.  She tapered off of prednisone as of early October 2020.      We also previously talked about other treatment options if her disease becomes refractory to steroids, such as splenectomy.      Hemoglobin is trending down again, and labs are consistent with hemolysis again.  She is symptomatic, with more fatigue and shortness of breath.      -Start prednisone again at 60 mg po daily  -monitor weekly labs  -when hemoglobin gets back up to around 10, we can taper down on prednisone again  -if there is no response on steroids, will give course of rituximab again weekly x 4 doses.  She only wants to [facility] the [facility].    -RTC with me in 2 months      2) CVID:   -continue IVIG.  She gets it, if IgG is <600.  It's been about every other month.    -IgG check and IVIG every 8 weeks if meets parameters      Sabina Boyd MD  Hematology/Oncology  [facility]        Phone call duration: 12 minutes    Total time spent on day of visit, including review of tests, obtaining/reviewing separately obtained history, ordering medications/tests/procedures, communicating with PCP/consultants, and documenting in electronic medical record: 30 minutes          Again, thank you for allowing me to participate in the care of your patient.        Sincerely,        Sabina Boyd MD

## 2021-06-10 NOTE — PROGRESS NOTES
Tricia is a 80 year old who is being evaluated via a billable telephone visit.      What phone number would you like to be contacted at?    [phone]    How would you like to obtain your AVS? Dxa           [facility]    Hematology/Oncology Established Patient Follow-up Note    Today's Date: 6/10/21    Reason for Follow-up: Hemolytic anemia-autoimmune; IgA deficiency    HISTORY OF PRESENT ILLNESS: Tricia Sosa is a 80 year old female who presents with autoimmune hemolytic anemia and IgA deficiency.  She previously saw Dr. Matos and then Dr. Summers.  She was previously seen at [facility].    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  She was very sick at the time and had severe diarrhea.  She lost about 40 pounds in weight.  The workup revealed that she had markedly diminished CD4 counts of 48 and was diagnosed with CMV colitis.  Since then she has been followed in hematology clinic at [place] until recently.  She was noted to have anemia which was fairly long-standing.  She was initially referred for anemia in 2004 and also had a bone marrow biopsy done at that time which revealed hypercellular bone marrow with 70-80% cellularity and normal trilineage hematopoiesis and no morphologic features of MDS or lymphoproliferation.  Her anemia was attributed to her chronic underlying disease.  At the next evaluation in 2002 on 4 aggressive anemia there was no evidence of hemolysis, iron deficiency, B12 or folate deficiency.  Bone marrow biopsy was not pursued.  In summer of 2015 she had progressive fatigue, dyspnea on exertion and worsening anemia with a hemoglobin now of 9.  Workup at this time did suggest a hemolytic anemia with elevated LDH at 709, undetectable haptoglobin and elevated unconjugated bilirubin at 3.3.  Monospecific MARIKA was positive for both IgG and complement.  Cold agglutinin screen was positive with very low titer 1:64.  She was  started on prednisone 60 mg daily with supplementation of iron folate and B12 starting 7/31/15.  Her prednisone has been slowly tapered and was discontinued off in January 2016.  She was last followed at [facility] on March 10, 2016 when she had stable hemoglobin.    She was followed by Dr. Matos and Dr. Summers.  Due to relapse of hemolytic anemia, she underwent another course of prednisone that has since been tapered off and rituximab x 4 weekly doses completed in 9/19/19.    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  She started weekly Rituxan on 7/31/20 x 4 doses, completed on 8/21/20.  She tapered off of prednisone in October 2020.      INTERIM HISTORY: Tricia has felt more tired and short of breath.  Her hemoglobin decreased again, and she did start prednisone again on 6/8/21.        REVIEW OF SYSTEMS:   14 point ROS was reviewed and is negative other than as noted above in HPI.       HOME MEDICATIONS:  Current Outpatient Medications   Medication Sig Dispense Refill     cyanocobalamin (VITAMIN  B-12) 1000 MCG tablet   3     folic acid (FOLVITE) 1 MG tablet   3     hydrochlorothiazide (HYDRODIURIL) 12.5 MG tablet TAKE ONE TABLET BY MOUTH ONE TIME DAILY  30 tablet 0     hydrocortisone butyrate (LOCOID) 0.1 % SOLN solution Apply to scalp bid prn 60 mL 11     ketoconazole (NIZORAL) 2 % external shampoo Use every other day to scalp as needed 120 mL 11     levothyroxine (SYNTHROID/LEVOTHROID) 150 MCG tablet Take 1 tablet (150 mcg) by mouth daily  90 tablet 0     predniSONE (DELTASONE) 20 MG tablet Take 3 tablets (60 mg) by mouth daily 100 tablet 0     sulfamethoxazole-trimethoprim (BACTRIM DS) 800-160 MG tablet Take 1 pill orally on Mon, Wed and Friday 45 tablet 3     triamcinolone (KENALOG) 0.1 % external cream        UNABLE TO FIND privigen inj every two months           ALLERGIES:  Allergies   Allergen Reactions     Doxycycline          PAST MEDICAL  HISTORY:  Past Medical History:   Diagnosis Date     WARD (dyspnea on exertion)      External hemorrhoids without mention of complication 6/27/05     Hemolytic anemia (H)     autoimmune     Hypothyroidism      IgA deficiency (H)      Other malaise and fatigue      Other severe protein-calorie malnutrition      Thyroid disease      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    Hemorrhage Subdural Trauma Without LOC Active     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, and posterior vitreous detachment     Urinary tract infection, site not specified     Recurrent UTI's         PAST SURGICAL HISTORY:  Past Surgical History:   Procedure Laterality Date     C LIGATE FALLOPIAN TUBE       COLONOSCOPY N/A 4/26/2016    Procedure: COLONOSCOPY;  Surgeon: Dontae Del Rio MD;  Location: [facility]     EYE SURGERY Bilateral 04/20/2021    Cataract Surgery     HC COLONOSCOPY W BIOPSY  4/26/00     HC COLONOSCOPY W BIOPSY  10/8/03     HC COLONOSCOPY W BIOPSY  6/27/05    REPEAT IN 5 YEARS     HC CYSTOSCOPY,INSERT URETERAL STENT      Ureteral stent insertion     HC FLEX SIGMOIDOSCOPY W BIOPSY  5/30/00     HC LAPAROSCOPY, SURGICAL; CHOLECYSTECTOMY  1992      THYROIDECTOMY       LIGATION OF HEMORRHOID(S)      Hemorrhoidectomy     UPPER GI ENDOSCOPY,BIOPSY  10/01/01         SOCIAL HISTORY:  Social History     Socioeconomic History     Marital status:      Spouse name: Not on file     Number of children: Not on file     Years of education: Not on file     Highest education level: Not on file   Occupational History     Not on file   Social Needs     Financial resource strain: Not on file     Food insecurity     Worry: Not on file     Inability: Not on file     Transportation needs     Medical: Not on file     Non-medical: Not on file   Tobacco Use     Smoking status: Never Smoker     Smokeless tobacco: Never Used   Substance and Sexual Activity     Alcohol use: No     Alcohol/week: 0.0  standard drinks     Frequency: Never     Drug use: No     Sexual activity: Never   Lifestyle     Physical activity     Days per week: Not on file     Minutes per session: Not on file     Stress: Not on file   Relationships     Social connections     Talks on phone: Not on file     Gets together: Not on file     Attends Voodoo service: Not on file     Active member of club or organization: Not on file     Attends meetings of clubs or organizations: Not on file     Relationship status: Not on file     Intimate partner violence     Fear of current or ex partner: Not on file     Emotionally abused: Not on file     Physically abused: Not on file     Forced sexual activity: Not on file   Other Topics Concern     Parent/sibling w/ CABG, MI or angioplasty before 65F 55M? Not Asked   Social History Narrative     Not on file         FAMILY HISTORY:  Family History   Problem Relation Age of Onset     Colon Cancer Mother 90     Cerebrovascular Disease Father          at age 85     Dementia Brother      Dementia Brother      Pancreatic Cancer Brother      Breast Cancer Sister          PHYSICAL EXAM:  Phone visit.      LABS:  CBC RESULTS:   Recent Labs   Lab Test 21  0950   WBC 4.4   RBC 2.36*   HGB 7.9*   HCT 26.2*   *   MCH 33.5*   MCHC 30.2*   RDW 15.4*   *     Recent Labs   Lab Test 21  1011 21  1016    138   POTASSIUM 4.4 4.0   CHLORIDE 108 107   CO2 29 29   ANIONGAP 2* 2*   * 96   BUN 12 11   CR 0.77 0.77   CULLEN 8.4* 8.2*     Lab Results   Component Value Date    AST 44 2021     Lab Results   Component Value Date    ALT 22 2021     No results found for: BILICONJ   Lab Results   Component Value Date    BILITOTAL 2.0 2021     Lab Results   Component Value Date    ALBUMIN 4.0 2021     Lab Results   Component Value Date    PROTTOTAL 6.5 2021      Lab Results   Component Value Date    ALKPHOS 103 2021     Component      Latest Ref Rng & Units  6/3/2021   IGG      610 - 1,616 mg/dL 557 (L)   IGA      84 - 499 mg/dL <2 (L)   IGM      35 - 242 mg/dL 16 (L)   % Retic      0.5 - 2.0 % 10.0 (H)   Absolute Retic      25 - 95 10e9/L 242.0 (H)   Haptoglobin      32 - 197 mg/dL <3 (L)   Lactate Dehydrogenase      81 - 234 U/L 496 (H)           ASSESSMENT/PLAN:  Tricia Sosa is a 80 year old female with:      1. 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 Common variable immunodeficiency disorder  4. Splenomegaly        1) Autoimmune hemolytic anemia: She had relapse in July 2020, and started on prednisone, as well as weekly rituximab infusions x 4, completed on 8/21/20.  Her hemoglobin slowly improved back up to her baseline and has been stable.  She tapered off of prednisone as of early October 2020.      We also previously talked about other treatment options if her disease becomes refractory to steroids, such as splenectomy.      Hemoglobin is trending down again, and labs are consistent with hemolysis again.  She is symptomatic, with more fatigue and shortness of breath.      -Start prednisone again at 60 mg po daily  -monitor weekly labs  -when hemoglobin gets back up to around 10, we can taper down on prednisone again  -if there is no response on steroids, will give course of rituximab again weekly x 4 doses.  She only wants to go to the [facility] clinic.    -RTC with me in 2 months      2) CVID:   -continue IVIG.  She gets it, if IgG is <600.  It's been about every other month.    -IgG check and IVIG every 8 weeks if meets parameters      Sabina Boyd MD  Hematology/Oncology  [facility]        Phone call duration: 12 minutes    Total time spent on day of visit, including review of tests, obtaining/reviewing separately obtained history, ordering medications/tests/procedures, communicating with PCP/consultants, and documenting in  electronic medical record: 30 minutes

## 2021-06-10 NOTE — LETTER
6/10/2021         RE: Tricia Sosa  [address]  [place] [address]        Dear Colleague,    Thank you for referring your patient, Tricia Sosa, to the [facility]. Please see a copy of my visit note below.    Tricia is a 80 year old who is being evaluated via a billable telephone visit.      What phone number would you like to be contacted at?    [phone]    How would you like to obtain your AVS? Dax           [facility]    Hematology/Oncology Established Patient Follow-up Note    Today's Date: 6/10/21    Reason for Follow-up: Hemolytic anemia-autoimmune; IgA deficiency    HISTORY OF PRESENT ILLNESS: Tricia Sosa is a 80 year old female who presents with autoimmune hemolytic anemia and IgA deficiency.  She previously saw Dr. Matos and then Dr. Smumers.  She was previously seen at [facility].    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  She was very sick at the time and had severe diarrhea.  She lost about 40 pounds in weight.  The workup revealed that she had markedly diminished CD4 counts of 48 and was diagnosed with CMV colitis.  Since then she has been followed in hematology clinic at [place] until recently.  She was noted to have anemia which was fairly long-standing.  She was initially referred for anemia in 2004 and also had a bone marrow biopsy done at that time which revealed hypercellular bone marrow with 70-80% cellularity and normal trilineage hematopoiesis and no morphologic features of MDS or lymphoproliferation.  Her anemia was attributed to her chronic underlying disease.  At the next evaluation in 2002 on 4 aggressive anemia there was no evidence of hemolysis, iron deficiency, B12 or folate deficiency.  Bone marrow biopsy was not pursued.  In summer of 2015 she had progressive fatigue, dyspnea on exertion and worsening anemia with a hemoglobin now of 9.  Workup at this time  did suggest a hemolytic anemia with elevated LDH at 709, undetectable haptoglobin and elevated unconjugated bilirubin at 3.3.  Monospecific MARIKA was positive for both IgG and complement.  Cold agglutinin screen was positive with very low titer 1:64.  She was started on prednisone 60 mg daily with supplementation of iron folate and B12 starting 7/31/15.  Her prednisone has been slowly tapered and was discontinued off in January 2016.  She was last followed at [facility] on March 10, 2016 when she had stable hemoglobin.    She was followed by Dr. Matos and Dr. Summers.  Due to relapse of hemolytic anemia, she underwent another course of prednisone that has since been tapered off and rituximab x 4 weekly doses completed in 9/19/19.    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  She started weekly Rituxan on 7/31/20 x 4 doses, completed on 8/21/20.  She tapered off of prednisone in October 2020.      INTERIM HISTORY: Tricia has felt more tired and short of breath.  Her hemoglobin decreased again, and she did start prednisone again on 6/8/21.        REVIEW OF SYSTEMS:   14 point ROS was reviewed and is negative other than as noted above in HPI.       HOME MEDICATIONS:  Current Outpatient Medications   Medication Sig Dispense Refill     cyanocobalamin (VITAMIN  B-12) 1000 MCG tablet   3     folic acid (FOLVITE) 1 MG tablet   3     hydrochlorothiazide (HYDRODIURIL) 12.5 MG tablet TAKE ONE TABLET BY MOUTH ONE TIME DAILY  30 tablet 0     hydrocortisone butyrate (LOCOID) 0.1 % SOLN solution Apply to scalp bid prn 60 mL 11     ketoconazole (NIZORAL) 2 % external shampoo Use every other day to scalp as needed 120 mL 11     levothyroxine (SYNTHROID/LEVOTHROID) 150 MCG tablet Take 1 tablet (150 mcg) by mouth daily  90 tablet 0     predniSONE (DELTASONE) 20 MG tablet Take 3 tablets (60 mg) by mouth daily 100 tablet 0     sulfamethoxazole-trimethoprim (BACTRIM DS) 800-160 MG  tablet Take 1 pill orally on Mon, Wed and Friday 45 tablet 3     triamcinolone (KENALOG) 0.1 % external cream        UNABLE TO FIND privigen inj every two months           ALLERGIES:  Allergies   Allergen Reactions     Doxycycline          PAST MEDICAL HISTORY:  Past Medical History:   Diagnosis Date     WARD (dyspnea on exertion)      External hemorrhoids without mention of complication 6/27/05     Hemolytic anemia (H)     autoimmune     Hypothyroidism      IgA deficiency (H)      Other malaise and fatigue      Other severe protein-calorie malnutrition      Thyroid disease      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    Hemorrhage Subdural Trauma Without LOC Active     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, and posterior vitreous detachment     Urinary tract infection, site not specified     Recurrent UTI's         PAST SURGICAL HISTORY:  Past Surgical History:   Procedure Laterality Date     C LIGATE FALLOPIAN TUBE       COLONOSCOPY N/A 4/26/2016    Procedure: COLONOSCOPY;  Surgeon: Dontae Del Rio MD;  Location:  GI     EYE SURGERY Bilateral 04/20/2021    Cataract Surgery     HC COLONOSCOPY W BIOPSY  4/26/00     HC COLONOSCOPY W BIOPSY  10/8/03     HC COLONOSCOPY W BIOPSY  6/27/05    REPEAT IN 5 YEARS     HC CYSTOSCOPY,INSERT URETERAL STENT      Ureteral stent insertion     HC FLEX SIGMOIDOSCOPY W BIOPSY  5/30/00     HC LAPAROSCOPY, SURGICAL; CHOLECYSTECTOMY  1992      THYROIDECTOMY       LIGATION OF HEMORRHOID(S)      Hemorrhoidectomy     UPPER GI ENDOSCOPY,BIOPSY  10/01/01         SOCIAL HISTORY:  Social History     Socioeconomic History     Marital status:      Spouse name: Not on file     Number of children: Not on file     Years of education: Not on file     Highest education level: Not on file   Occupational History     Not on file   Social Needs     Financial resource strain: Not on file     Food insecurity     Worry: Not on file     Inability:  Not on file     Transportation needs     Medical: Not on file     Non-medical: Not on file   Tobacco Use     Smoking status: Never Smoker     Smokeless tobacco: Never Used   Substance and Sexual Activity     Alcohol use: No     Alcohol/week: 0.0 standard drinks     Frequency: Never     Drug use: No     Sexual activity: Never   Lifestyle     Physical activity     Days per week: Not on file     Minutes per session: Not on file     Stress: Not on file   Relationships     Social connections     Talks on phone: Not on file     Gets together: Not on file     Attends Yazidi service: Not on file     Active member of club or organization: Not on file     Attends meetings of clubs or organizations: Not on file     Relationship status: Not on file     Intimate partner violence     Fear of current or ex partner: Not on file     Emotionally abused: Not on file     Physically abused: Not on file     Forced sexual activity: Not on file   Other Topics Concern     Parent/sibling w/ CABG, MI or angioplasty before 65F 55M? Not Asked   Social History Narrative     Not on file         FAMILY HISTORY:  Family History   Problem Relation Age of Onset     Colon Cancer Mother 90     Cerebrovascular Disease Father          at age 85     Dementia Brother      Dementia Brother      Pancreatic Cancer Brother      Breast Cancer Sister          PHYSICAL EXAM:  Phone visit.      LABS:  CBC RESULTS:   Recent Labs   Lab Test 21  0950   WBC 4.4   RBC 2.36*   HGB 7.9*   HCT 26.2*   *   MCH 33.5*   MCHC 30.2*   RDW 15.4*   *     Recent Labs   Lab Test 21  1011 21  1016    138   POTASSIUM 4.4 4.0   CHLORIDE 108 107   CO2 29 29   ANIONGAP 2* 2*   * 96   BUN 12 11   CR 0.77 0.77   CULLEN 8.4* 8.2*     Lab Results   Component Value Date    AST 44 2021     Lab Results   Component Value Date    ALT 22 2021     No results found for: BILICONJ   Lab Results   Component Value Date    BILITOTAL 2.0  06/03/2021     Lab Results   Component Value Date    ALBUMIN 4.0 06/03/2021     Lab Results   Component Value Date    PROTTOTAL 6.5 06/03/2021      Lab Results   Component Value Date    ALKPHOS 103 06/03/2021     Component      Latest Ref Rng & Units 6/3/2021   IGG      610 - 1,616 mg/dL 557 (L)   IGA      84 - 499 mg/dL <2 (L)   IGM      35 - 242 mg/dL 16 (L)   % Retic      0.5 - 2.0 % 10.0 (H)   Absolute Retic      25 - 95 10e9/L 242.0 (H)   Haptoglobin      32 - 197 mg/dL <3 (L)   Lactate Dehydrogenase      81 - 234 U/L 496 (H)           ASSESSMENT/PLAN:  Tricia Sosa is a 80 year old female with:      1. 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 Common variable immunodeficiency disorder  4. Splenomegaly        1) Autoimmune hemolytic anemia: She had relapse in July 2020, and started on prednisone, as well as weekly rituximab infusions x 4, completed on 8/21/20.  Her hemoglobin slowly improved back up to her baseline and has been stable.  She tapered off of prednisone as of early October 2020.      We also previously talked about other treatment options if her disease becomes refractory to steroids, such as splenectomy.      Hemoglobin is trending down again, and labs are consistent with hemolysis again.  She is symptomatic, with more fatigue and shortness of breath.      -Start prednisone again at 60 mg po daily  -monitor weekly labs  -when hemoglobin gets back up to around 10, we can taper down on prednisone again  -if there is no response on steroids, will give course of rituximab again weekly x 4 doses.  She only wants to go to the [facility].    -RTC with me in 2 months      2) CVID:   -continue IVIG.  She gets it, if IgG is <600.  It's been about every other month.    -IgG check and IVIG every 8 weeks if meets parameters      Sabina Boyd MD  Hematology/Oncology  [facility]  [facility]        Phone call duration: 12 minutes    Total time spent on day of visit, including review of tests, obtaining/reviewing separately obtained history, ordering medications/tests/procedures, communicating with PCP/consultants, and documenting in electronic medical record: 30 minutes          Again, thank you for allowing me to participate in the care of your patient.        Sincerely,        Sabina Boyd MD

## 2021-06-14 NOTE — PATIENT INSTRUCTIONS
Weekly labs scheduled 6/15, 6/29, 7/7, 7/14, 7/21, 7/28, 8/3. Staff message sent to  schedulers to schedule another lab appt around 6/22.  Rituximab scheduled 7/7, 7/14, 7/21, 7/28  IVIG scheduled 8/3, labs to be drawn 7/28  Appt with Dr. Boyd scheduled 8/3  Anabell Hawkins RN on 6/14/2021 at 3:07 PM

## 2021-06-15 ENCOUNTER — HOSPITAL ENCOUNTER (OUTPATIENT)
Facility: CLINIC | Age: 81
Setting detail: SPECIMEN
Discharge: HOME | End: 2021-06-15
Attending: INTERNAL MEDICINE | Admitting: INTERNAL MEDICINE
Payer: MEDICARE

## 2021-06-15 ENCOUNTER — HOSPITAL ENCOUNTER (OUTPATIENT)
Dept: PHYSICAL THERAPY | Facility: CLINIC | Age: 81
Setting detail: THERAPIES SERIES
End: 2021-06-15
Attending: INTERNAL MEDICINE
Payer: MEDICARE

## 2021-06-15 LAB
BASOPHILS # BLD AUTO: 0 10E9/L (ref 0–0.2)
BASOPHILS NFR BLD AUTO: 0.2 %
DIFFERENTIAL METHOD BLD: ABNORMAL
EOSINOPHIL # BLD AUTO: 0 10E9/L (ref 0–0.7)
EOSINOPHIL NFR BLD AUTO: 0 %
ERYTHROCYTE [DISTWIDTH] IN BLOOD BY AUTOMATED COUNT: 13.6 % (ref 10–15)
HCT VFR BLD AUTO: 27.8 % (ref 35–47)
HGB BLD-MCNC: 8.9 G/DL (ref 11.7–15.7)
IMM GRANULOCYTES # BLD: 0.1 10E9/L (ref 0–0.4)
IMM GRANULOCYTES NFR BLD: 0.8 %
LYMPHOCYTES # BLD AUTO: 1.1 10E9/L (ref 0.8–5.3)
LYMPHOCYTES NFR BLD AUTO: 16.5 %
MCH RBC QN AUTO: 32.8 PG (ref 26.5–33)
MCHC RBC AUTO-ENTMCNC: 32 G/DL (ref 31.5–36.5)
MCV RBC AUTO: 103 FL (ref 78–100)
MONOCYTES # BLD AUTO: 1 10E9/L (ref 0–1.3)
MONOCYTES NFR BLD AUTO: 15.1 %
NEUTROPHILS # BLD AUTO: 4.3 10E9/L (ref 1.6–8.3)
NEUTROPHILS NFR BLD AUTO: 67.4 %
NRBC # BLD AUTO: 0 10*3/UL
NRBC BLD AUTO-RTO: 0 /100
PLATELET # BLD AUTO: 175 10E9/L (ref 150–450)
RBC # BLD AUTO: 2.71 10E12/L (ref 3.8–5.2)
WBC # BLD AUTO: 6.4 10E9/L (ref 4–11)

## 2021-06-15 PROCEDURE — 85025 COMPLETE CBC W/AUTO DIFF WBC: CPT | Performed by: INTERNAL MEDICINE

## 2021-06-15 PROCEDURE — 36415 COLL VENOUS BLD VENIPUNCTURE: CPT | Performed by: INTERNAL MEDICINE

## 2021-06-15 PROCEDURE — 97110 THERAPEUTIC EXERCISES: CPT | Mod: GP | Performed by: PHYSICAL THERAPIST

## 2021-06-15 PROCEDURE — 36415 COLL VENOUS BLD VENIPUNCTURE: CPT

## 2021-06-22 ENCOUNTER — OFFICE VISIT (OUTPATIENT)
Dept: INTERNAL MEDICINE | Facility: CLINIC | Age: 81
End: 2021-06-22
Payer: MEDICARE

## 2021-06-22 ENCOUNTER — HOSPITAL ENCOUNTER (OUTPATIENT)
Facility: CLINIC | Age: 81
Setting detail: SPECIMEN
Discharge: HOME | End: 2021-06-22
Attending: INTERNAL MEDICINE | Admitting: INTERNAL MEDICINE
Payer: MEDICARE

## 2021-06-22 VITALS
OXYGEN SATURATION: 100 % | DIASTOLIC BLOOD PRESSURE: 67 MMHG | RESPIRATION RATE: 14 BRPM | BODY MASS INDEX: 19.98 KG/M2 | SYSTOLIC BLOOD PRESSURE: 126 MMHG | TEMPERATURE: 98.2 F | WEIGHT: 147.5 LBS | HEART RATE: 98 BPM | HEIGHT: 72 IN

## 2021-06-22 DIAGNOSIS — J47.9 BRONCHIECTASIS WITHOUT COMPLICATION (H): ICD-10-CM

## 2021-06-22 DIAGNOSIS — K51.90 ULCERATIVE COLITIS WITHOUT COMPLICATIONS, UNSPECIFIED LOCATION (H): ICD-10-CM

## 2021-06-22 DIAGNOSIS — Z00.00 ENCOUNTER FOR MEDICARE ANNUAL WELLNESS EXAM: Primary | ICD-10-CM

## 2021-06-22 DIAGNOSIS — D59.10 AUTOIMMUNE HEMOLYTIC ANEMIA (H): ICD-10-CM

## 2021-06-22 DIAGNOSIS — E44.1 MILD PROTEIN-CALORIE MALNUTRITION (H): ICD-10-CM

## 2021-06-22 DIAGNOSIS — E03.9 ACQUIRED HYPOTHYROIDISM: ICD-10-CM

## 2021-06-22 DIAGNOSIS — R60.0 BILATERAL LEG EDEMA: ICD-10-CM

## 2021-06-22 LAB
BASOPHILS # BLD AUTO: 0 10E9/L (ref 0–0.2)
BASOPHILS NFR BLD AUTO: 0.1 %
DIFFERENTIAL METHOD BLD: ABNORMAL
EOSINOPHIL # BLD AUTO: 0 10E9/L (ref 0–0.7)
EOSINOPHIL NFR BLD AUTO: 0 %
ERYTHROCYTE [DISTWIDTH] IN BLOOD BY AUTOMATED COUNT: 13.2 % (ref 10–15)
HCT VFR BLD AUTO: 31.8 % (ref 35–47)
HGB BLD-MCNC: 9.8 G/DL (ref 11.7–15.7)
IMM GRANULOCYTES # BLD: 0.1 10E9/L (ref 0–0.4)
IMM GRANULOCYTES NFR BLD: 0.6 %
LYMPHOCYTES # BLD AUTO: 0.5 10E9/L (ref 0.8–5.3)
LYMPHOCYTES NFR BLD AUTO: 3.9 %
MCH RBC QN AUTO: 29.9 PG (ref 26.5–33)
MCHC RBC AUTO-ENTMCNC: 30.8 G/DL (ref 31.5–36.5)
MCV RBC AUTO: 97 FL (ref 78–100)
MONOCYTES # BLD AUTO: 1 10E9/L (ref 0–1.3)
MONOCYTES NFR BLD AUTO: 8.3 %
NEUTROPHILS # BLD AUTO: 10.8 10E9/L (ref 1.6–8.3)
NEUTROPHILS NFR BLD AUTO: 87.1 %
NRBC # BLD AUTO: 0 10*3/UL
NRBC BLD AUTO-RTO: 0 /100
PLATELET # BLD AUTO: 171 10E9/L (ref 150–450)
RBC # BLD AUTO: 3.28 10E12/L (ref 3.8–5.2)
WBC # BLD AUTO: 12.4 10E9/L (ref 4–11)

## 2021-06-22 PROCEDURE — 99214 OFFICE O/P EST MOD 30 MIN: CPT | Mod: 25 | Performed by: INTERNAL MEDICINE

## 2021-06-22 PROCEDURE — 85025 COMPLETE CBC W/AUTO DIFF WBC: CPT | Performed by: INTERNAL MEDICINE

## 2021-06-22 PROCEDURE — G0439 PPPS, SUBSEQ VISIT: HCPCS | Performed by: INTERNAL MEDICINE

## 2021-06-22 PROCEDURE — 36415 COLL VENOUS BLD VENIPUNCTURE: CPT

## 2021-06-22 PROCEDURE — 84443 ASSAY THYROID STIM HORMONE: CPT | Performed by: INTERNAL MEDICINE

## 2021-06-22 PROCEDURE — 36415 COLL VENOUS BLD VENIPUNCTURE: CPT | Performed by: INTERNAL MEDICINE

## 2021-06-22 RX ORDER — HYDROCHLOROTHIAZIDE 12.5 MG/1
12.5 TABLET ORAL DAILY PRN
Qty: 30 TABLET | Refills: 5 | Status: SHIPPED | OUTPATIENT
Start: 2021-06-22 | End: 2023-02-01

## 2021-06-22 RX ORDER — LEVOTHYROXINE SODIUM 150 UG/1
150 TABLET ORAL DAILY
Qty: 90 TABLET | Refills: 3 | Status: SHIPPED | OUTPATIENT
Start: 2021-06-22 | End: 2022-08-10

## 2021-06-22 RX ORDER — HYDROCORTISONE BUTYRATE 1 MG/ML
SOLUTION TOPICAL
Qty: 60 ML | Refills: 11 | Status: CANCELLED | OUTPATIENT
Start: 2021-06-22

## 2021-06-22 ASSESSMENT — ACTIVITIES OF DAILY LIVING (ADL): CURRENT_FUNCTION: HOUSEWORK REQUIRES ASSISTANCE

## 2021-06-22 ASSESSMENT — MIFFLIN-ST. JEOR: SCORE: 1251.06

## 2021-06-22 NOTE — PROGRESS NOTES
"SUBJECTIVE:   Tricia Sosa is a 80 year old female who presents for Preventive Visit.      Patient has been advised of split billing requirements and indicates understanding: Yes   Are you in the first 12 months of your Medicare coverage?  No    Healthy Habits:     In general, how would you rate your overall health?  Good    Frequency of exercise:  6-7 days/week    Duration of exercise:  15-30 minutes    Do you usually eat at least 4 servings of fruit and vegetables a day, include whole grains    & fiber and avoid regularly eating high fat or \"junk\" foods?  No    Taking medications regularly:  Yes    Medication side effects:  Not applicable    Ability to successfully perform activities of daily living:  Housework requires assistance    Home Safety:  No safety concerns identified    Hearing Impairment:  Difficulty following a conversation in a noisy restaurant or crowded room, need to ask people to speak up or repeat themselves and difficulty understanding speech on the telephone    In the past 6 months, have you been bothered by leaking of urine? Yes    In general, how would you rate your overall mental or emotional health?  Excellent      PHQ-2 Total Score: 0    Additional concerns today:  Yes    Problems:   1. 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 Bronchiectasis: stable  3. UC: quiet at this time   4. Hypothyroidism: stable  5. Edema: stable    Current concerns:   1. Fatigue:she is very tired during the day. She is not getting sleep through, only 3.5-5 hours per night due to prednisone for the anemia. She has tried benadryl which helps but concerned if she can continue this.   2. She is concerned about her nutrition, not eating much, not sure if she is getting protein. Weight has gone down 10 pounds in 2 months. Would like referral to dietician.     Do you feel safe in your environment? Yes    Have you ever done Advance Care Planning? (For example, a Health Directive, POLST, or a " discussion with a medical provider or your loved ones about your wishes): Yes, advance care planning is on file.       Fall risk  Fallen 2 or more times in the past year?: No  Any fall with injury in the past year?: No    Cognitive Screening   1) Repeat 3 items (Leader, Season, Table)    2) Clock draw: NORMAL  3) 3 item recall: Recalls 3 objects  Results: 3 items recalled: COGNITIVE IMPAIRMENT LESS LIKELY    Mini-CogTM Copyright ERNESTINA Vanessa. Licensed by the author for use in [facility]; reprinted with permission (roxi@[facility]). All rights reserved.      Do you have sleep apnea, excessive snoring or daytime drowsiness?: yes    Reviewed and updated as needed this visit by clinical staff  Tobacco  Allergies  Meds      Soc Hx        Reviewed and updated as needed this visit by Provider                Social History     Tobacco Use     Smoking status: Never Smoker     Smokeless tobacco: Never Used   Substance Use Topics     Alcohol use: Yes     Alcohol/week: 0.0 standard drinks     Frequency: Never     Comment: 1 a day at most     If you drink alcohol do you typically have >3 drinks per day or >7 drinks per week? Yes      Alcohol Use 6/22/2021   Prescreen: >3 drinks/day or >7 drinks/week? No   Prescreen: >3 drinks/day or >7 drinks/week? -   No flowsheet data found.            Current providers sharing in care for this patient include:   Patient Care Team:  Emily Church MD as PCP - General (Internal Medicine)  Emily Church MD as Assigned PCP  Anabell Hawkins RN as Specialty Care Coordinator (Hematology & Oncology)  Sabina Boyd MD as Assigned Cancer Care Provider    The following health maintenance items are reviewed in Epic and correct as of today:  Health Maintenance Due   Topic Date Due     ANNUAL REVIEW OF HM ORDERS  Never done     ZOSTER IMMUNIZATION (2 of 3) 02/26/2007     MEDICARE ANNUAL WELLNESS VISIT  07/10/2020     TSH W/FREE T4 REFLEX  03/16/2021     DTAP/TDAP/TD IMMUNIZATION (3  - Td) 04/21/2021     FALL RISK ASSESSMENT  06/24/2021     COLORECTAL CANCER SCREENING  07/19/2021             Pertinent mammograms are reviewed under the imaging tab.    Review of Systems  CONSTITUTIONAL:fatigue  INTEGUMENTARY/SKIN: NEGATIVE for worrisome rashes, moles or lesions  EYES: NEGATIVE for vision changes or irritation  ENT/MOUTH: NEGATIVE for ear, mouth and throat problems  RESP: NEGATIVE for significant cough or SOB  BREAST: NEGATIVE for masses, tenderness or discharge  CV: NEGATIVE for chest pain, palpitations or peripheral edema  GI: NEGATIVE for nausea, abdominal pain, heartburn, or change in bowel habits   female: some bladder leakage  MUSCULOSKELETAL: some low back pain   NEURO: infrequent vertigo  ENDOCRINE: NEGATIVE for temperature intolerance, skin/hair changes  HEME: NEGATIVE for bleeding problems  PSYCHIATRIC: NEGATIVE for changes in mood or affect    OBJECTIVE:   /67 (BP Location: Right arm, Patient Position: Sitting, Cuff Size: Adult Regular)   Pulse 98   Temp 98.2  F (36.8  C) (Oral)   Resp 14   Ht 1.829 m (6')   Wt 66.9 kg (147 lb 8 oz)   LMP  (LMP Unknown)   SpO2 100%   BMI 20.00 kg/m   Estimated body mass index is 20 kg/m  as calculated from the following:    Height as of this encounter: 1.829 m (6').    Weight as of this encounter: 66.9 kg (147 lb 8 oz).  Physical Exam        HEENT: extraocular movements are intact, pupils equal and reactive to light and accommodation, TMs clear  NECK: Neck supple. No adenopathy. Thyroid symmetric, normal size,, Carotids without bruits.  PULMONARY: clear to auscultation  CARDIAC: regular rate and rhythm and no murmurs, clicks, or gallops  PULSES: 2/2 throughout  BACK: no spinal or CVAT  ABDOMINAL: Soft, nontender.  Normal bowel sounds.  No hepatosplenomegaly or abnormal masses  BREAST: No breast masses or tenderness, No axillary masses or tenderness and No galactorrhea          ASSESSMENT / PLAN:   1. Encounter for Medicare annual wellness  exam  Td done    2. Ulcerative colitis without complications, unspecified location (H)  Stable, due for colonoscopy soon     3. Bronchiectasis without complication (H)  Stable without symptoms     4. Mild protein-calorie malnutrition (H)  Refer   - NUTRITION REFERRAL    5. Autoimmune hemolytic anemia  Per oncology, advised to use benadryl for sleep on high dose prednisone    6. Bilateral leg edema  Stable, refill med  - hydrochlorothiazide (HYDRODIURIL) 12.5 MG tablet; Take 1 tablet (12.5 mg) by mouth daily as needed (edema)  Dispense: 30 tablet; Refill: 5    7. Acquired hypothyroidism  Recheck lab   - levothyroxine (SYNTHROID/LEVOTHROID) 150 MCG tablet; Take 1 tablet (150 mcg) by mouth daily  Dispense: 90 tablet; Refill: 3  - TSH with free T4 reflex    Patient has been advised of split billing requirements and indicates understanding: Yes  COUNSELING:  Reviewed preventive health counseling, as reflected in patient instructions    Estimated body mass index is 20 kg/m  as calculated from the following:    Height as of this encounter: 1.829 m (6').    Weight as of this encounter: 66.9 kg (147 lb 8 oz).        She reports that she has never smoked. She has never used smokeless tobacco.      Appropriate preventive services were discussed with this patient, including applicable screening as appropriate for cardiovascular disease, diabetes, osteopenia/osteoporosis, and glaucoma.  As appropriate for age/gender, discussed screening for colorectal cancer, prostate cancer, breast cancer, and cervical cancer. Checklist reviewing preventive services available has been given to the patient.    Reviewed patients plan of care and provided an AVS. The Basic Care Plan (routine screening as documented in Health Maintenance) for Tricia meets the Care Plan requirement. This Care Plan has been established and reviewed with the Patient.    Counseling Resources:  ATP IV Guidelines  Pooled Cohorts Equation Calculator  Breast Cancer Risk  Calculator  Breast Cancer: Medication to Reduce Risk  FRAX Risk Assessment  ICSI Preventive Guidelines  Dietary Guidelines for Americans, 2010  [employer]'s MyPlate  ASA Prophylaxis  Lung CA Screening    Emily Church MD  [facility]    Identified Health Risks:

## 2021-06-23 ENCOUNTER — TELEPHONE (OUTPATIENT)
Dept: INTERNAL MEDICINE | Facility: CLINIC | Age: 81
End: 2021-06-23

## 2021-06-23 LAB — TSH SERPL DL<=0.005 MIU/L-ACNC: 0.46 MU/L (ref 0.4–4)

## 2021-06-23 NOTE — TELEPHONE ENCOUNTER
Nutrition Education Scheduling Outreach #1:    Call to patient to schedule. Left message with phone number to call to schedule.    Plan for 2nd outreach attempt within 1 week.    Angy Ryder  [address] [facility]  Diabetes and Nutrition Scheduling

## 2021-06-27 PROBLEM — H52.203 MYOPIA OF BOTH EYES WITH ASTIGMATISM AND PRESBYOPIA: Status: RESOLVED | Noted: 2017-08-16 | Resolved: 2021-06-27

## 2021-06-27 PROBLEM — H52.4 MYOPIA OF BOTH EYES WITH ASTIGMATISM AND PRESBYOPIA: Status: RESOLVED | Noted: 2017-08-16 | Resolved: 2021-06-27

## 2021-06-27 PROBLEM — H25.13 NUCLEAR SENILE CATARACT OF BOTH EYES: Status: RESOLVED | Noted: 2017-08-16 | Resolved: 2021-06-27

## 2021-06-27 PROBLEM — H52.13 MYOPIA OF BOTH EYES WITH ASTIGMATISM AND PRESBYOPIA: Status: RESOLVED | Noted: 2017-08-16 | Resolved: 2021-06-27

## 2021-06-29 ENCOUNTER — HOSPITAL ENCOUNTER (OUTPATIENT)
Dept: PHYSICAL THERAPY | Facility: CLINIC | Age: 81
Setting detail: THERAPIES SERIES
End: 2021-06-29
Attending: INTERNAL MEDICINE
Payer: MEDICARE

## 2021-06-29 DIAGNOSIS — D59.19 OTHER AUTOIMMUNE HEMOLYTIC ANEMIA (H): ICD-10-CM

## 2021-06-29 LAB
ALBUMIN SERPL-MCNC: 3.9 G/DL (ref 3.4–5)
ALP SERPL-CCNC: 97 U/L (ref 40–150)
ALT SERPL W P-5'-P-CCNC: 36 U/L (ref 0–50)
ANION GAP SERPL CALCULATED.3IONS-SCNC: 4 MMOL/L (ref 3–14)
AST SERPL W P-5'-P-CCNC: 30 U/L (ref 0–45)
BASOPHILS # BLD AUTO: 0 10E9/L (ref 0–0.2)
BASOPHILS NFR BLD AUTO: 0 %
BILIRUB SERPL-MCNC: 0.8 MG/DL (ref 0.2–1.3)
BUN SERPL-MCNC: 20 MG/DL (ref 7–30)
CALCIUM SERPL-MCNC: 8.4 MG/DL (ref 8.5–10.1)
CHLORIDE SERPL-SCNC: 103 MMOL/L (ref 94–109)
CO2 SERPL-SCNC: 26 MMOL/L (ref 20–32)
CREAT SERPL-MCNC: 0.65 MG/DL (ref 0.52–1.04)
DIFFERENTIAL METHOD BLD: ABNORMAL
EOSINOPHIL # BLD AUTO: 0 10E9/L (ref 0–0.7)
EOSINOPHIL NFR BLD AUTO: 0 %
ERYTHROCYTE [DISTWIDTH] IN BLOOD BY AUTOMATED COUNT: 13.5 % (ref 10–15)
GFR SERPL CREATININE-BSD FRML MDRD: 84 ML/MIN/{1.73_M2}
GLUCOSE SERPL-MCNC: 110 MG/DL (ref 70–99)
HCT VFR BLD AUTO: 31.1 % (ref 35–47)
HGB BLD-MCNC: 9.8 G/DL (ref 11.7–15.7)
IMM GRANULOCYTES # BLD: 0 10E9/L (ref 0–0.4)
IMM GRANULOCYTES NFR BLD: 0.5 %
LYMPHOCYTES # BLD AUTO: 0.5 10E9/L (ref 0.8–5.3)
LYMPHOCYTES NFR BLD AUTO: 5.9 %
MCH RBC QN AUTO: 29.2 PG (ref 26.5–33)
MCHC RBC AUTO-ENTMCNC: 31.5 G/DL (ref 31.5–36.5)
MCV RBC AUTO: 93 FL (ref 78–100)
MONOCYTES # BLD AUTO: 0.5 10E9/L (ref 0–1.3)
MONOCYTES NFR BLD AUTO: 6 %
NEUTROPHILS # BLD AUTO: 6.7 10E9/L (ref 1.6–8.3)
NEUTROPHILS NFR BLD AUTO: 87.6 %
NRBC # BLD AUTO: 0 10*3/UL
NRBC BLD AUTO-RTO: 0 /100
PLATELET # BLD AUTO: 126 10E9/L (ref 150–450)
POTASSIUM SERPL-SCNC: 4 MMOL/L (ref 3.4–5.3)
PROT SERPL-MCNC: 6.4 G/DL (ref 6.8–8.8)
RBC # BLD AUTO: 3.36 10E12/L (ref 3.8–5.2)
SODIUM SERPL-SCNC: 133 MMOL/L (ref 133–144)
WBC # BLD AUTO: 7.6 10E9/L (ref 4–11)

## 2021-06-29 PROCEDURE — 97110 THERAPEUTIC EXERCISES: CPT | Mod: GP | Performed by: PHYSICAL THERAPIST

## 2021-06-29 PROCEDURE — 36415 COLL VENOUS BLD VENIPUNCTURE: CPT

## 2021-06-29 PROCEDURE — 80053 COMPREHEN METABOLIC PANEL: CPT | Performed by: INTERNAL MEDICINE

## 2021-06-29 PROCEDURE — 85025 COMPLETE CBC W/AUTO DIFF WBC: CPT | Performed by: INTERNAL MEDICINE

## 2021-06-29 PROCEDURE — 36591 DRAW BLOOD OFF VENOUS DEVICE: CPT

## 2021-06-30 ENCOUNTER — PATIENT OUTREACH (OUTPATIENT)
Dept: ONCOLOGY | Facility: CLINIC | Age: 81
End: 2021-06-30

## 2021-06-30 NOTE — PROGRESS NOTES
Sabina Boyd MD Anderson, Amanda E, RN   Cc: P  Cancer Clinic Rn Pool   Caller: Unspecified (Today,  9:28 AM)             Yes, okay to cancel Rituxan infusions.      Writer called Tricia and updated her. Tricia verbalized understanding and is in agreement with the plan. Rituxan appointments were canceled.     Anabell Hawkins RN, BSN, [facility]  RN Care Coordinator  [facility]  [phone]

## 2021-06-30 NOTE — PROGRESS NOTES
Maandrew called in to clinic returning call to discuss Dr. Boyd's result note:     Sabina Boyd MD   6/29/2021  4:02 PM CDT      Hemoglobin stable at 9.8.  Continue prednisone (can decrease to 40 mg) and weekly labs.       Tricia verbalized understanding. She also asked if she needs to proceed with the Rituxan appointments or if those can be canceled now that her Hgb has been stable. Writer will verify with Dr. Boyd, ok to cancel Rituxan infusions. Writer also forwarded her call to  schedulers to schedule more weekly labs.     Anabell Hawkins, RN, BSN, COLLEEN  RN Care Coordinator  [facility]  [phone]

## 2021-07-01 ENCOUNTER — HOSPITAL ENCOUNTER (OUTPATIENT)
Facility: CLINIC | Age: 81
Setting detail: SPECIMEN
Discharge: HOME | End: 2021-07-01
Attending: INTERNAL MEDICINE | Admitting: INTERNAL MEDICINE
Payer: MEDICARE

## 2021-07-06 ENCOUNTER — HOSPITAL ENCOUNTER (OUTPATIENT)
Dept: PHYSICAL THERAPY | Facility: CLINIC | Age: 81
Setting detail: THERAPIES SERIES
End: 2021-07-06
Attending: INTERNAL MEDICINE
Payer: MEDICARE

## 2021-07-06 ENCOUNTER — HOSPITAL ENCOUNTER (OUTPATIENT)
Facility: CLINIC | Age: 81
Setting detail: SPECIMEN
Discharge: HOME | End: 2021-07-06
Attending: INTERNAL MEDICINE | Admitting: INTERNAL MEDICINE
Payer: MEDICARE

## 2021-07-06 DIAGNOSIS — D59.19 OTHER AUTOIMMUNE HEMOLYTIC ANEMIA (H): ICD-10-CM

## 2021-07-06 LAB
BASOPHILS # BLD AUTO: 0 10E9/L (ref 0–0.2)
BASOPHILS NFR BLD AUTO: 0.1 %
DIFFERENTIAL METHOD BLD: ABNORMAL
EOSINOPHIL # BLD AUTO: 0 10E9/L (ref 0–0.7)
EOSINOPHIL NFR BLD AUTO: 0 %
ERYTHROCYTE [DISTWIDTH] IN BLOOD BY AUTOMATED COUNT: 14.1 % (ref 10–15)
HCT VFR BLD AUTO: 32.2 % (ref 35–47)
HGB BLD-MCNC: 10.1 G/DL (ref 11.7–15.7)
IMM GRANULOCYTES # BLD: 0.1 10E9/L (ref 0–0.4)
IMM GRANULOCYTES NFR BLD: 0.7 %
LYMPHOCYTES # BLD AUTO: 0.5 10E9/L (ref 0.8–5.3)
LYMPHOCYTES NFR BLD AUTO: 6.2 %
MCH RBC QN AUTO: 29.5 PG (ref 26.5–33)
MCHC RBC AUTO-ENTMCNC: 31.4 G/DL (ref 31.5–36.5)
MCV RBC AUTO: 94 FL (ref 78–100)
MONOCYTES # BLD AUTO: 0.3 10E9/L (ref 0–1.3)
MONOCYTES NFR BLD AUTO: 3.7 %
NEUTROPHILS # BLD AUTO: 6.8 10E9/L (ref 1.6–8.3)
NEUTROPHILS NFR BLD AUTO: 89.3 %
NRBC # BLD AUTO: 0 10*3/UL
NRBC BLD AUTO-RTO: 0 /100
PLATELET # BLD AUTO: 130 10E9/L (ref 150–450)
RBC # BLD AUTO: 3.42 10E12/L (ref 3.8–5.2)
WBC # BLD AUTO: 7.6 10E9/L (ref 4–11)

## 2021-07-06 PROCEDURE — 97112 NEUROMUSCULAR REEDUCATION: CPT | Mod: GP | Performed by: PHYSICAL THERAPIST

## 2021-07-06 PROCEDURE — 36415 COLL VENOUS BLD VENIPUNCTURE: CPT

## 2021-07-06 PROCEDURE — 85025 COMPLETE CBC W/AUTO DIFF WBC: CPT | Performed by: INTERNAL MEDICINE

## 2021-07-06 PROCEDURE — 97110 THERAPEUTIC EXERCISES: CPT | Mod: GP | Performed by: PHYSICAL THERAPIST

## 2021-07-13 ENCOUNTER — HOSPITAL ENCOUNTER (OUTPATIENT)
Facility: CLINIC | Age: 81
Discharge: HOME | End: 2021-07-13
Attending: INTERNAL MEDICINE | Admitting: INTERNAL MEDICINE
Payer: MEDICARE

## 2021-07-13 ENCOUNTER — HOSPITAL ENCOUNTER (OUTPATIENT)
Dept: PHYSICAL THERAPY | Facility: CLINIC | Age: 81
Setting detail: THERAPIES SERIES
End: 2021-07-13
Attending: INTERNAL MEDICINE
Payer: MEDICARE

## 2021-07-13 ENCOUNTER — LAB (OUTPATIENT)
Dept: ONCOLOGY | Facility: CLINIC | Age: 81
End: 2021-07-13
Attending: INTERNAL MEDICINE
Payer: MEDICARE

## 2021-07-13 DIAGNOSIS — D59.19 OTHER AUTOIMMUNE HEMOLYTIC ANEMIA (H): ICD-10-CM

## 2021-07-13 LAB
BASOPHILS # BLD AUTO: 0 10E3/UL (ref 0–0.2)
BASOPHILS NFR BLD AUTO: 0 %
EOSINOPHIL # BLD AUTO: 0 10E3/UL (ref 0–0.7)
EOSINOPHIL NFR BLD AUTO: 0 %
ERYTHROCYTE [DISTWIDTH] IN BLOOD BY AUTOMATED COUNT: 14.2 % (ref 10–15)
HCT VFR BLD AUTO: 33.4 % (ref 35–47)
HGB BLD-MCNC: 10 G/DL (ref 11.7–15.7)
IMM GRANULOCYTES # BLD: 0 10E3/UL
IMM GRANULOCYTES NFR BLD: 0 %
LYMPHOCYTES # BLD AUTO: 0.6 10E3/UL (ref 0.8–5.3)
LYMPHOCYTES NFR BLD AUTO: 10 %
MCH RBC QN AUTO: 28 PG (ref 26.5–33)
MCHC RBC AUTO-ENTMCNC: 29.9 G/DL (ref 31.5–36.5)
MCV RBC AUTO: 94 FL (ref 78–100)
MONOCYTES # BLD AUTO: 0.2 10E3/UL (ref 0–1.3)
MONOCYTES NFR BLD AUTO: 4 %
NEUTROPHILS # BLD AUTO: 5.1 10E3/UL (ref 1.6–8.3)
NEUTROPHILS NFR BLD AUTO: 86 %
NRBC # BLD AUTO: 0 10E3/UL
NRBC BLD AUTO-RTO: 0 /100
PLATELET # BLD AUTO: 170 10E3/UL (ref 150–450)
RBC # BLD AUTO: 3.57 10E6/UL (ref 3.8–5.2)
WBC # BLD AUTO: 5.9 10E3/UL (ref 4–11)

## 2021-07-13 PROCEDURE — 97112 NEUROMUSCULAR REEDUCATION: CPT | Mod: GP | Performed by: PHYSICAL THERAPIST

## 2021-07-13 PROCEDURE — 36415 COLL VENOUS BLD VENIPUNCTURE: CPT

## 2021-07-13 PROCEDURE — 85025 COMPLETE CBC W/AUTO DIFF WBC: CPT

## 2021-07-15 NOTE — PROGRESS NOTES
Infusion Nursing Note:  Tricia Sosa presents today for Rituxan.    Patient seen by provider today: No   present during visit today: Not Applicable.    Note: Started rituxan at 100ml/hr and increased by 100ml/hr every 30 minutes for a maximum of 400ml/hr.  Patient states she is feeling much better and continues on prednisone taper.  HGB improving.  She will have IGG level drawn on 8/21.  Scheduling is aware to schedule next IVIG infusion for 8/26.    Intravenous Access:  Labs drawn without difficulty.  Peripheral IV placed.    Treatment Conditions:  Lab Results   Component Value Date    HGB 8.7 08/14/2020     Lab Results   Component Value Date    WBC 4.6 08/14/2020      Lab Results   Component Value Date    ANEU 3.9 08/14/2020     Lab Results   Component Value Date     08/14/2020      Results reviewed, labs MET treatment parameters, ok to proceed with treatment.      Post Infusion Assessment:  Patient tolerated infusion without incident.  Blood return noted pre and post infusion.  Site patent and intact, free from redness, edema or discomfort.  No evidence of extravasations.  Access discontinued per protocol.       Discharge Plan:   Copy of AVS reviewed with patient and/or family.  Patient will return 8/21 for next appointment.  Patient discharged in stable condition accompanied by: self.  Departure Mode: Ambulatory.    Ally Adams RN                         yes

## 2021-07-20 ENCOUNTER — HOSPITAL ENCOUNTER (OUTPATIENT)
Facility: CLINIC | Age: 81
Discharge: HOME | End: 2021-07-20
Attending: INTERNAL MEDICINE | Admitting: INTERNAL MEDICINE
Payer: MEDICARE

## 2021-07-20 ENCOUNTER — HOSPITAL ENCOUNTER (OUTPATIENT)
Dept: PHYSICAL THERAPY | Facility: CLINIC | Age: 81
Setting detail: THERAPIES SERIES
End: 2021-07-20
Attending: INTERNAL MEDICINE
Payer: MEDICARE

## 2021-07-20 ENCOUNTER — LAB (OUTPATIENT)
Dept: ONCOLOGY | Facility: CLINIC | Age: 81
End: 2021-07-20
Attending: INTERNAL MEDICINE
Payer: MEDICARE

## 2021-07-20 DIAGNOSIS — D59.19 OTHER AUTOIMMUNE HEMOLYTIC ANEMIA (H): ICD-10-CM

## 2021-07-20 DIAGNOSIS — E53.8 B12 DEFICIENCY: ICD-10-CM

## 2021-07-20 DIAGNOSIS — D83.9 CVID (COMMON VARIABLE IMMUNODEFICIENCY) (H): ICD-10-CM

## 2021-07-20 LAB
BASOPHILS # BLD AUTO: 0 10E3/UL (ref 0–0.2)
BASOPHILS NFR BLD AUTO: 0 %
EOSINOPHIL # BLD AUTO: 0 10E3/UL (ref 0–0.7)
EOSINOPHIL NFR BLD AUTO: 0 %
ERYTHROCYTE [DISTWIDTH] IN BLOOD BY AUTOMATED COUNT: 13.7 % (ref 10–15)
HCT VFR BLD AUTO: 34.7 % (ref 35–47)
HGB BLD-MCNC: 10.5 G/DL (ref 11.7–15.7)
IMM GRANULOCYTES # BLD: 0.1 10E3/UL
IMM GRANULOCYTES NFR BLD: 1 %
LYMPHOCYTES # BLD AUTO: 0.6 10E3/UL (ref 0.8–5.3)
LYMPHOCYTES NFR BLD AUTO: 7 %
MCH RBC QN AUTO: 27.9 PG (ref 26.5–33)
MCHC RBC AUTO-ENTMCNC: 30.3 G/DL (ref 31.5–36.5)
MCV RBC AUTO: 92 FL (ref 78–100)
MONOCYTES # BLD AUTO: 0.5 10E3/UL (ref 0–1.3)
MONOCYTES NFR BLD AUTO: 6 %
NEUTROPHILS # BLD AUTO: 7.4 10E3/UL (ref 1.6–8.3)
NEUTROPHILS NFR BLD AUTO: 86 %
NRBC # BLD AUTO: 0 10E3/UL
NRBC BLD AUTO-RTO: 0 /100
PLATELET # BLD AUTO: 140 10E3/UL (ref 150–450)
RBC # BLD AUTO: 3.76 10E6/UL (ref 3.8–5.2)
WBC # BLD AUTO: 8.6 10E3/UL (ref 4–11)

## 2021-07-20 PROCEDURE — 85025 COMPLETE CBC W/AUTO DIFF WBC: CPT

## 2021-07-20 PROCEDURE — 36415 COLL VENOUS BLD VENIPUNCTURE: CPT

## 2021-07-20 PROCEDURE — 97112 NEUROMUSCULAR REEDUCATION: CPT | Mod: GP | Performed by: PHYSICAL THERAPIST

## 2021-07-20 PROCEDURE — 97750 PHYSICAL PERFORMANCE TEST: CPT | Mod: GP | Performed by: PHYSICAL THERAPIST

## 2021-07-20 RX ORDER — PREDNISONE 20 MG/1
20 TABLET ORAL DAILY
Qty: 30 TABLET | Refills: 0 | Status: SHIPPED | OUTPATIENT
Start: 2021-07-20 | End: 2021-09-28

## 2021-07-20 NOTE — PROGRESS NOTES
07/20/21 1052   Signing Clinician's Name / Credentials   Signing clinician's name / credentials Montserrat May, PT   Dynamic Gait Index (Lorenzo and Ayala Hinsdale, 1995)   Gait Level Surface 2  (with SEC, upright, no deviation)   Change in Gait Speed 2   Gait and Horizontal Head Turns 2  (no deviation)   Gait with Vertical Head Turns 2  (no deviation or staggering)   Gait and Pivot Turns 2   Step Over Obstacle 3   Step Around Obstacles 3   Steps 2   Total Dynamic Gait Index Score  (A score of 19 or less has been correlated to an increased risk of falls in community dwelling older adults, patients with vestibular disorders, and patients with MS.)   Total Score (out of 24) 18   Dynamic Gait Index (DGI):The DGI is a measure of balance during gait that is reliable and valid for the elderly and individuals with Parkinson's disease, MS, vestibular disorders, or s/p stroke. Gait assistive device used: None    Patient score: 18/24  Scores ?19/24 indicate an increased risk for falls according to Kylee et al 2000  Minimal Detectable Change = 2.9 in community dwelling elderly according to Sebastian et al 2011    Assessment (rationale for performing, application to patient s function & care plan): Assessment of gait stability with and without cane to assess progress and assist in care planning. Without her cane, pt demo increased instability and reduced postural stability with score of 17/24. With her cane, she has reduced score to 18/24 due to use of cane but demo good stability and no deviations while using it. Her score with her cane is unchanged from last month. She appears to have hit a plateau in regards to her gait stability. She will benefit from continued use of HEP to gain postural strength and stability to improve gait stability and reduce fall risk in time.     Minutes billed as physical performance test: 11

## 2021-07-20 NOTE — TELEPHONE ENCOUNTER
Signed Prescriptions:                        Disp   Refills    predniSONE (DELTASONE) 20 MG tablet        30 tab*0        Sig: Take 1 tablet (20 mg) by mouth daily  Authorizing Provider: LEON DALLAS, RN, BSN, COLLEEN  RN Care Coordinator  [facility]  [phone]

## 2021-07-21 RX ORDER — PREDNISONE 5 MG/1
20 TABLET ORAL DAILY
Qty: 120 TABLET | Refills: 0 | Status: SHIPPED | OUTPATIENT
Start: 2021-07-21 | End: 2021-08-20

## 2021-07-21 NOTE — TELEPHONE ENCOUNTER
Pending Prescriptions:                       Disp   Refills    predniSONE (DELTASONE) 5 MG tablet [Pharm*30 tab*0            Sig: Take 1 tablet (5 mg) by mouth daily          Last Written Prescription Date:  6/9/2021  Last Fill Quantity: 180,   # refills: 0  Last Office Visit: 6/10/2021  Future Office visit:    Next 5 appointments (look out 90 days)    Aug 03, 2021  8:15 AM  Return Visit with Sabina Boyd MD  [facility] [address]  [facility] [address]  [phone]   Aug 26, 2021 11:00 AM  Telephone Visit with  [facility] [employer]  [facility] ([facility] ) [address] [place] [id number] [phone]           Routing refill request to provider for review/approval  Asha Shields RN on 7/21/2021 at 9:56 AM

## 2021-07-21 NOTE — PROGRESS NOTES
"Outpatient Physical Therapy Discharge Note     Patient: Tricia Sosa  : 1940    Beginning/End Dates of Reporting Period:  21 to 21. Tricia was seen an additional 5 visits in this period to progress balance and gait training, strengthening and HEP.    Referring Provider: Emily Church MD    Therapy Diagnosis: s/s BPPV, gait instability     Client Self Report: Patient feels she has made progress with therapy, has found it to be more beneficial than therapies in the past.    Objective Measurements:  Objective Measure: Postural stability  Details: Rhomberg on floor EO 30\", EC 10\". Rhomberg on foam EO 30\", EC 1-2\"    Objective Measure: DGI  Details:  without AD,  with SEC. Improved from eval ; unchanged from last assessment in .    Goals:  Goal Identifier BPPV   Goal Description Patient will have negative s/s of BPPV for safety with mobility.   Target Date 21   Date Met  21   Progress (detail required for progress note): goal met 21 and BPPV remains resolved     Goal Identifier DHI   Goal Description Patient will score 0/100 on DHI indicating resolution of dizziness for greater tolerance and confidence with mobility.   Target Date 21   Date Met  21   Progress (detail required for progress note): goal met 21 and dizziness did not return     Goal Identifier Gait stability   Goal Description Patient will have improved gait stability to be able to perform DGI with score of at least 20/24 indicating ability to safely walk about her home without AD and reduced fall risk.   Target Date 21   Date Met      Progress (detail required for progress note): not met, pt seems to have hit a plateau at score of 18/24. This is, however, improved significantly over score at eval ()     Goal Identifier HEP   Goal Description Patient will be indep in strengthening and balance program to continue working towards optimal mobility following discharge from therapy.  "   Target Date 07/21/21   Date Met  07/20/21   Progress (detail required for progress note): pt is independent and compliant with HEP. She is able to safely perform her HEP to continue making progress in strength and balance over time. She has made nice gains in postural awareness but continues to need to work on core strength to be able to maintain erect postures longer while walking. Her cane is helpful in supporting her posture in the meantime while she works indep to get stronger. She has good gait stability with use of her cane.       Plan:  Discharge from therapy.    Discharge:    Reason for Discharge: improvements with recent plateau in progress, indep HEP    Discharge Plan: Patient to continue home program.

## 2021-07-27 ENCOUNTER — HOSPITAL ENCOUNTER (OUTPATIENT)
Facility: CLINIC | Age: 81
Discharge: HOME | End: 2021-07-27
Attending: INTERNAL MEDICINE | Admitting: INTERNAL MEDICINE
Payer: MEDICARE

## 2021-07-27 ENCOUNTER — LAB (OUTPATIENT)
Dept: ONCOLOGY | Facility: CLINIC | Age: 81
End: 2021-07-27
Attending: INTERNAL MEDICINE
Payer: MEDICARE

## 2021-07-27 DIAGNOSIS — D59.19 OTHER AUTOIMMUNE HEMOLYTIC ANEMIA (H): ICD-10-CM

## 2021-07-27 LAB
BASOPHILS # BLD AUTO: 0 10E3/UL (ref 0–0.2)
BASOPHILS NFR BLD AUTO: 0 %
EOSINOPHIL # BLD AUTO: 0 10E3/UL (ref 0–0.7)
EOSINOPHIL NFR BLD AUTO: 0 %
ERYTHROCYTE [DISTWIDTH] IN BLOOD BY AUTOMATED COUNT: 13.8 % (ref 10–15)
HCT VFR BLD AUTO: 33.9 % (ref 35–47)
HGB BLD-MCNC: 10.4 G/DL (ref 11.7–15.7)
IMM GRANULOCYTES # BLD: 0 10E3/UL
IMM GRANULOCYTES NFR BLD: 0 %
LYMPHOCYTES # BLD AUTO: 2 10E3/UL (ref 0.8–5.3)
LYMPHOCYTES NFR BLD AUTO: 32 %
MCH RBC QN AUTO: 27.5 PG (ref 26.5–33)
MCHC RBC AUTO-ENTMCNC: 30.7 G/DL (ref 31.5–36.5)
MCV RBC AUTO: 90 FL (ref 78–100)
MONOCYTES # BLD AUTO: 0.7 10E3/UL (ref 0–1.3)
MONOCYTES NFR BLD AUTO: 11 %
NEUTROPHILS # BLD AUTO: 3.5 10E3/UL (ref 1.6–8.3)
NEUTROPHILS NFR BLD AUTO: 57 %
NRBC # BLD AUTO: 0 10E3/UL
NRBC BLD AUTO-RTO: 0 /100
PLATELET # BLD AUTO: 167 10E3/UL (ref 150–450)
RBC # BLD AUTO: 3.78 10E6/UL (ref 3.8–5.2)
WBC # BLD AUTO: 6.2 10E3/UL (ref 4–11)

## 2021-07-27 PROCEDURE — 85025 COMPLETE CBC W/AUTO DIFF WBC: CPT | Performed by: INTERNAL MEDICINE

## 2021-07-27 PROCEDURE — 36415 COLL VENOUS BLD VENIPUNCTURE: CPT

## 2021-08-03 ENCOUNTER — LAB (OUTPATIENT)
Dept: INFUSION THERAPY | Facility: CLINIC | Age: 81
End: 2021-08-03
Attending: INTERNAL MEDICINE
Payer: MEDICARE

## 2021-08-03 ENCOUNTER — ONCOLOGY VISIT (OUTPATIENT)
Dept: ONCOLOGY | Facility: CLINIC | Age: 81
End: 2021-08-03
Attending: INTERNAL MEDICINE
Payer: MEDICARE

## 2021-08-03 VITALS
WEIGHT: 154.5 LBS | SYSTOLIC BLOOD PRESSURE: 148 MMHG | RESPIRATION RATE: 16 BRPM | OXYGEN SATURATION: 99 % | HEART RATE: 75 BPM | BODY MASS INDEX: 20.93 KG/M2 | HEIGHT: 72 IN | TEMPERATURE: 97.8 F | DIASTOLIC BLOOD PRESSURE: 60 MMHG

## 2021-08-03 DIAGNOSIS — D59.19 OTHER AUTOIMMUNE HEMOLYTIC ANEMIA (H): ICD-10-CM

## 2021-08-03 DIAGNOSIS — D59.10 AUTOIMMUNE HEMOLYTIC ANEMIA (H): ICD-10-CM

## 2021-08-03 DIAGNOSIS — D83.9 CVID (COMMON VARIABLE IMMUNODEFICIENCY) (H): Primary | ICD-10-CM

## 2021-08-03 DIAGNOSIS — K13.79 MOUTH SORES: Primary | ICD-10-CM

## 2021-08-03 LAB
ALBUMIN SERPL-MCNC: 3.8 G/DL (ref 3.4–5)
ALP SERPL-CCNC: 87 U/L (ref 40–150)
ALT SERPL W P-5'-P-CCNC: 22 U/L (ref 0–50)
ANION GAP SERPL CALCULATED.3IONS-SCNC: 4 MMOL/L (ref 3–14)
AST SERPL W P-5'-P-CCNC: 23 U/L (ref 0–45)
BASOPHILS # BLD AUTO: 0 10E3/UL (ref 0–0.2)
BASOPHILS NFR BLD AUTO: 0 %
BILIRUB SERPL-MCNC: 0.7 MG/DL (ref 0.2–1.3)
BUN SERPL-MCNC: 16 MG/DL (ref 7–30)
CALCIUM SERPL-MCNC: 8.5 MG/DL (ref 8.5–10.1)
CHLORIDE BLD-SCNC: 106 MMOL/L (ref 94–109)
CO2 SERPL-SCNC: 28 MMOL/L (ref 20–32)
CREAT SERPL-MCNC: 0.73 MG/DL (ref 0.52–1.04)
EOSINOPHIL # BLD AUTO: 0 10E3/UL (ref 0–0.7)
EOSINOPHIL NFR BLD AUTO: 0 %
ERYTHROCYTE [DISTWIDTH] IN BLOOD BY AUTOMATED COUNT: 13.8 % (ref 10–15)
GFR SERPL CREATININE-BSD FRML MDRD: 78 ML/MIN/1.73M2
GLUCOSE BLD-MCNC: 97 MG/DL (ref 70–99)
HAPTOGLOB SERPL-MCNC: 16 MG/DL (ref 32–197)
HCT VFR BLD AUTO: 32.4 % (ref 35–47)
HGB BLD-MCNC: 9.8 G/DL (ref 11.7–15.7)
IGA SERPL-MCNC: <2 MG/DL (ref 84–499)
IGG SERPL-MCNC: 401 MG/DL (ref 610–1616)
IGM SERPL-MCNC: 10 MG/DL (ref 35–242)
IMM GRANULOCYTES # BLD: 0 10E3/UL
IMM GRANULOCYTES NFR BLD: 1 %
LDH SERPL L TO P-CCNC: 206 U/L (ref 81–234)
LYMPHOCYTES # BLD AUTO: 1.4 10E3/UL (ref 0.8–5.3)
LYMPHOCYTES NFR BLD AUTO: 35 %
MCH RBC QN AUTO: 26.5 PG (ref 26.5–33)
MCHC RBC AUTO-ENTMCNC: 30.2 G/DL (ref 31.5–36.5)
MCV RBC AUTO: 88 FL (ref 78–100)
MONOCYTES # BLD AUTO: 0.5 10E3/UL (ref 0–1.3)
MONOCYTES NFR BLD AUTO: 13 %
NEUTROPHILS # BLD AUTO: 2.1 10E3/UL (ref 1.6–8.3)
NEUTROPHILS NFR BLD AUTO: 51 %
NRBC # BLD AUTO: 0 10E3/UL
NRBC BLD AUTO-RTO: 0 /100
PLATELET # BLD AUTO: 163 10E3/UL (ref 150–450)
POTASSIUM BLD-SCNC: 3.7 MMOL/L (ref 3.4–5.3)
PROT SERPL-MCNC: 5.9 G/DL (ref 6.8–8.8)
RBC # BLD AUTO: 3.7 10E6/UL (ref 3.8–5.2)
RETICS # AUTO: 0.06 10E6/UL (ref 0.03–0.1)
RETICS/RBC NFR AUTO: 1.6 % (ref 0.5–2)
SODIUM SERPL-SCNC: 138 MMOL/L (ref 133–144)
WBC # BLD AUTO: 4 10E3/UL (ref 4–11)

## 2021-08-03 PROCEDURE — 85025 COMPLETE CBC W/AUTO DIFF WBC: CPT | Performed by: INTERNAL MEDICINE

## 2021-08-03 PROCEDURE — 99214 OFFICE O/P EST MOD 30 MIN: CPT | Performed by: INTERNAL MEDICINE

## 2021-08-03 PROCEDURE — 85045 AUTOMATED RETICULOCYTE COUNT: CPT | Performed by: INTERNAL MEDICINE

## 2021-08-03 PROCEDURE — 96366 THER/PROPH/DIAG IV INF ADDON: CPT

## 2021-08-03 PROCEDURE — 80053 COMPREHEN METABOLIC PANEL: CPT | Performed by: INTERNAL MEDICINE

## 2021-08-03 PROCEDURE — 83615 LACTATE (LD) (LDH) ENZYME: CPT | Performed by: INTERNAL MEDICINE

## 2021-08-03 PROCEDURE — 36415 COLL VENOUS BLD VENIPUNCTURE: CPT

## 2021-08-03 PROCEDURE — 96365 THER/PROPH/DIAG IV INF INIT: CPT

## 2021-08-03 PROCEDURE — 83010 ASSAY OF HAPTOGLOBIN QUANT: CPT | Performed by: INTERNAL MEDICINE

## 2021-08-03 PROCEDURE — 96375 TX/PRO/DX INJ NEW DRUG ADDON: CPT

## 2021-08-03 PROCEDURE — 82784 ASSAY IGA/IGD/IGG/IGM EACH: CPT | Performed by: INTERNAL MEDICINE

## 2021-08-03 PROCEDURE — 250N000011 HC RX IP 250 OP 636: Performed by: INTERNAL MEDICINE

## 2021-08-03 RX ORDER — ACETAMINOPHEN 325 MG/1
650 TABLET ORAL ONCE
Status: CANCELLED
Start: 2021-08-03

## 2021-08-03 RX ORDER — HEPARIN SODIUM (PORCINE) LOCK FLUSH IV SOLN 100 UNIT/ML 100 UNIT/ML
5 SOLUTION INTRAVENOUS
Status: CANCELLED | OUTPATIENT
Start: 2021-08-03

## 2021-08-03 RX ORDER — DIPHENHYDRAMINE HYDROCHLORIDE AND LIDOCAINE HYDROCHLORIDE AND ALUMINUM HYDROXIDE AND MAGNESIUM HYDRO
5-10 KIT EVERY 6 HOURS PRN
Qty: 237 ML | Refills: 1 | Status: SHIPPED | OUTPATIENT
Start: 2021-08-03 | End: 2021-11-16

## 2021-08-03 RX ORDER — HEPARIN SODIUM,PORCINE 10 UNIT/ML
5 VIAL (ML) INTRAVENOUS
Status: CANCELLED | OUTPATIENT
Start: 2021-08-03

## 2021-08-03 RX ORDER — DIPHENHYDRAMINE HCL 25 MG
50 CAPSULE ORAL ONCE
Status: CANCELLED | OUTPATIENT
Start: 2021-08-03

## 2021-08-03 RX ADMIN — HYDROCORTISONE SODIUM SUCCINATE 100 MG: 100 INJECTION, POWDER, FOR SOLUTION INTRAMUSCULAR; INTRAVENOUS at 09:16

## 2021-08-03 RX ADMIN — HUMAN IMMUNOGLOBULIN G 35 G: 20 LIQUID INTRAVENOUS at 09:30

## 2021-08-03 ASSESSMENT — MIFFLIN-ST. JEOR: SCORE: 1282.81

## 2021-08-03 ASSESSMENT — PAIN SCALES - GENERAL: PAINLEVEL: NO PAIN (0)

## 2021-08-03 NOTE — PROGRESS NOTES
Infusion Nursing Note:  Tricia Sosa presents today for IVIG.    Patient seen by provider today: Yes: Oliver   present during visit today: Not Applicable.    Note: N/A.      Intravenous Access:  Peripheral IV placed in FT    Treatment Conditions:  Lab Results   Component Value Date    HGB 9.8 08/03/2021    HGB 10.1 07/06/2021     Lab Results   Component Value Date    WBC 4.0 08/03/2021    WBC 7.6 07/06/2021      Lab Results   Component Value Date    ANEU 6.8 07/06/2021     Lab Results   Component Value Date     08/03/2021     07/06/2021      Biological Infusion Checklist:  ~~~ NOTE: If the patient answers yes to any of the questions below, hold the infusion and contact ordering provider or on-call provider.    1. Have you recently had an elevated temperature, fever, chills, productive cough, coughing for 3 weeks or longer or hemoptysis, abnormal vital signs, night sweats,  chest pain or have you noticed a decrease in your appetite, unexplained weight loss or fatigue? No  2. Do you have any open wounds or new incisions? No  3. Do you have any recent or upcoming hospitalizations, surgeries or dental procedures? No  4. Do you currently have or recently have had any signs of illness or infection or are you on any antibiotics? No  5. Have you had any new, sudden or worsening abdominal pain? No  6. Have you or anyone in your household received a live vaccination in the past 4 weeks? Please note:  No live vaccines while on biologic/chemotherapy until 6 months after the last treatment.  Patient can receive the flu vaccine (shot only) and the pneumovax.  It is optimal for the patient to get these vaccines mid cycle, but they can be given at any time as long as it is not on the day of the infusion. No  7. Have you recently been diagnosed with any new nervous system diseases (ie. Multiple sclerosis, Guillain [place], seizures, neurological changes) or cancer diagnosis? No  8. Are you on any form of  radiation or chemotherapy? No  9. Are you pregnant or breast feeding or do you have plans of pregnancy in the future? No  10. Have you been having any signs of worsening depression or suicidal ideations?  (benlysta only) No  11. Have there been any other new onset medical symptoms? No        Post Infusion Assessment:  Patient tolerated infusion without incident.  Blood return noted pre and post infusion.  Site patent and intact, free from redness, edema or discomfort.  No evidence of extravasations.  Access discontinued per protocol.  Biologic Infusion Post Education: Call the triage nurse at your clinic or seek medical attention if you have chills and/or temperature greater than or equal to 100.5, uncontrolled nausea/vomiting, diarrhea, constipation, dizziness, shortness of breath, chest pain, heart palpitations, weakness or any other new or concerning symptoms, questions or concerns.  You cannot have any live virus vaccines prior to or during treatment or up to 6 months post infusion.  If you have an upcoming surgery, medical procedure or dental procedure during treatment, this should be discussed with your ordering physician and your surgeon/dentist.  If you are having any concerning symptom, if you are unsure if you should get your next infusion or wish to speak to a provider before your next infusion, please call your care coordinator or triage nurse at your clinic to notify them so we can adequately serve you.       Discharge Plan:   Discharge instructions reviewed with: Patient.  Patient and/or family verbalized understanding of discharge instructions and all questions answered.  AVS to patient via [employer]  Patient will return 8/17 for next appointment.   Patient discharged in stable condition accompanied by: self.  Departure Mode: Ambulatory with cane.      Serina Otto RN

## 2021-08-03 NOTE — LETTER
8/3/2021         RE: Tricia Sosa  [id number]  [facility] [id number]        Dear Colleague,    Thank you for referring your patient, Tricia Sosa, to the [facility]. Please see a copy of my visit note below.    [facility]    Hematology/Oncology Established Patient Follow-up Note      Today's Date: 8/03/21    Reason for Follow-up: Hemolytic anemia-autoimmune; IgA deficiency    HISTORY OF PRESENT ILLNESS: Tricia Sosa is a 80 year old female who presents with autoimmune hemolytic anemia and IgA deficiency.  She previously saw Dr. Matos and then Dr. Summers.  She was previously seen at [facility].    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  She was very sick at the time and had severe diarrhea.  She lost about 40 pounds in weight.  The workup revealed that she had markedly diminished CD4 counts of 48 and was diagnosed with CMV colitis.  Since then she has been followed in hematology clinic at [place] until recently.  She was noted to have anemia which was fairly long-standing.  She was initially referred for anemia in 2004 and also had a bone marrow biopsy done at that time which revealed hypercellular bone marrow with 70-80% cellularity and normal trilineage hematopoiesis and no morphologic features of MDS or lymphoproliferation.  Her anemia was attributed to her chronic underlying disease.  At the next evaluation in 2002 on 4 aggressive anemia there was no evidence of hemolysis, iron deficiency, B12 or folate deficiency.  Bone marrow biopsy was not pursued.  In summer of 2015 she had progressive fatigue, dyspnea on exertion and worsening anemia with a hemoglobin now of 9.  Workup at this time did suggest a hemolytic anemia with elevated LDH at 709, undetectable haptoglobin and elevated unconjugated bilirubin at 3.3.  Monospecific MARIKA was positive for both IgG and complement.  Cold agglutinin screen  was positive with very low titer 1:64.  She was started on prednisone 60 mg daily with supplementation of iron folate and B12 starting 7/31/15.  Her prednisone has been slowly tapered and was discontinued off in January 2016.  She was last followed at [facility] on March 10, 2016 when she had stable hemoglobin.    She was followed by Dr. Matos and Dr. Summers.  Due to relapse of hemolytic anemia, she underwent another course of prednisone that has since been tapered off and rituximab x 4 weekly doses completed in 9/19/19.    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  She started weekly Rituxan on 7/31/20 x 4 doses, completed on 8/21/20.  She tapered off of prednisone in October 2020.    Due to relapse of her AIHA, she started prednisone again on 6/8/21 at 60 mg daily with slow taper.      INTERIM HISTORY: Tricia is doing well.  She is down to prednisone 15 mg daily now, as of 7/28/21.   She notes that she has had mouth sores since starting prednisone.  She notes some easy bruising on her arms when she gets lab draws.  She has difficulty sleeping when she is on the higher dose prednisone, but that is getting better now that she is down to lower doses.          REVIEW OF SYSTEMS:   14 point ROS was reviewed and is negative other than as noted above in HPI.       HOME MEDICATIONS:  Current Outpatient Medications   Medication Sig Dispense Refill     cyanocobalamin (VITAMIN  B-12) 1000 MCG tablet   3     folic acid (FOLVITE) 1 MG tablet   3     hydrochlorothiazide (HYDRODIURIL) 12.5 MG tablet Take 1 tablet (12.5 mg) by mouth daily as needed (edema) 30 tablet 5     hydrocortisone butyrate (LOCOID) 0.1 % SOLN solution Apply to scalp bid prn 60 mL 11     ketoconazole (NIZORAL) 2 % external shampoo Use every other day to scalp as needed 120 mL 11     levothyroxine (SYNTHROID/LEVOTHROID) 150 MCG tablet Take 1 tablet (150 mcg) by mouth daily 90 tablet 3     magic  mouthwash suspension, diphenhydrAMINE, lidocaine, aluminum-magnesium & simethicone, (FIRST-MOUTHWASH BLM) compounding kit Swish and swallow 5-10 mLs in mouth every 6 hours as needed for mouth sores 237 mL 1     predniSONE (DELTASONE) 20 MG tablet Take 1 tablet (20 mg) by mouth daily 30 tablet 0     predniSONE (DELTASONE) 5 MG tablet Take 4 tablets (20 mg) by mouth daily 120 tablet 0     sulfamethoxazole-trimethoprim (BACTRIM DS) 800-160 MG tablet Take 1 pill orally on Mon, Wed and Friday 45 tablet 3     triamcinolone (KENALOG) 0.1 % external cream        UNABLE TO FIND privigen inj every two months           ALLERGIES:  Allergies   Allergen Reactions     Doxycycline          PAST MEDICAL HISTORY:  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, and posterior vitreous detachment     Urinary tract infection, site not specified     Recurrent UTI's         PAST SURGICAL HISTORY:  Past Surgical History:   Procedure Laterality Date     C LIGATE FALLOPIAN TUBE       COLONOSCOPY N/A 4/26/2016    Procedure: COLONOSCOPY;  Surgeon: Dontae Del Rio MD;  Location:  GI     ENT SURGERY  1985    Thyroid lobectomy     EYE SURGERY Bilateral 04/20/2021    Cataract Surgery      CYSTOSCOPY,INSERT URETERAL STENT      Ureteral stent insertion     HC FLEX SIGMOIDOSCOPY W BIOPSY  5/30/00      LAPAROSCOPY, SURGICAL; CHOLECYSTECTOMY  1992      THYROIDECTOMY       LIGATION OF  HEMORRHOID(S)      Hemorrhoidectomy     UPPER GI ENDOSCOPY,BIOPSY  10/01/01     [facility] COLONOSCOPY W BIOPSY  00     [facility] COLONOSCOPY W BIOPSY  10/8/03     [facility] COLONOSCOPY W BIOPSY  05    REPEAT IN 5 YEARS         SOCIAL HISTORY:  Social History     Socioeconomic History     Marital status:      Spouse name: Not on file     Number of children: Not on file     Years of education: Not on file     Highest education level: Not on file   Occupational History     Not on file   Tobacco Use     Smoking status: Never Smoker     Smokeless tobacco: Never Used   Substance and Sexual Activity     Alcohol use: Yes     Alcohol/week: 0.0 standard drinks     Comment: 1 a day at most     Drug use: No     Sexual activity: Not Currently     Partners: Male   Other Topics Concern     Parent/sibling w/ CABG, MI or angioplasty before 65F 55M? No   Social History Narrative     Not on file     Social Determinants of Health     Financial Resource Strain:      Difficulty of Paying Living Expenses:    Food Insecurity:      Worried About Running Out of Food in the Last Year:      Ran Out of Food in the Last Year:    Transportation Needs:      Lack of Transportation (Medical):      Lack of Transportation (Non-Medical):    Physical Activity:      Days of Exercise per Week:      Minutes of Exercise per Session:    Stress:      Feeling of Stress :    Social Connections:      Frequency of Communication with Friends and Family:      Frequency of Social Gatherings with Friends and Family:      Attends Jewish Services:      Active Member of Clubs or Organizations:      Attends Club or Organization Meetings:      Marital Status:    Intimate Partner Violence: Not At Risk     Fear of Current or Ex-Partner: No     Emotionally Abused: No     Physically Abused: No     Sexually Abused: No         FAMILY HISTORY:  Family History   Problem Relation Age of Onset     Colon Cancer Mother 90     Cerebrovascular Disease Father          at age 85      Dementia Brother      Prostate Cancer Brother      Thyroid Disease Brother      Dementia Brother      Thyroid Disease Brother      Pancreatic Cancer Brother      Breast Cancer Sister      Hyperlipidemia Sister      Depression Sister      Anxiety Disorder Sister      Thyroid Disease Sister      Breast Cancer Sister      Colon Cancer Niece      Other Cancer Niece         leukemia         PHYSICAL EXAM:  BP (!) 148/60   Pulse 75   Temp 97.8  F (36.6  C) (Tympanic)   Resp 16   Ht 1.829 m (6')   Wt 70.1 kg (154 lb 8 oz)   LMP  (LMP Unknown)   SpO2 99%   BMI 20.95 kg/m    ECO  GENERAL/CONSTITUTIONAL: No acute distress.  EYES: No scleral icterus.  NEUROLOGIC: Alert, oriented, answers questions appropriately.  INTEGUMENTARY: No jaundice.  GAIT: Uses cane.        LABS:  CBC RESULTS: Recent Labs   Lab Test 21  0801   WBC 4.0   RBC 3.70*   HGB 9.8*   HCT 32.4*   MCV 88   MCH 26.5   MCHC 30.2*   RDW 13.8            ASSESSMENT/PLAN:  Tricia Sosa is a 80 year old female with:      1. 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 Common variable immunodeficiency disorder  4. Splenomegaly        1) Autoimmune hemolytic anemia: She had relapse in 2020, and started on prednisone, as well as weekly rituximab infusions x 4, completed on 20.  Her hemoglobin slowly improved back up to her baseline and has been stable.  She tapered off of prednisone as of early 2020.  She had another relapse, and stated on prednisone again on 21.  She has had good response in hemoglobin back to her baseline with prednisone alone, and is now on taper.      We also previously talked about other treatment options if her disease becomes refractory to steroids, such as splenectomy.      She is currently on prednisone 15 mg daily as of 21.  Hemoglobin is down slightly to 9.8, but reticulocyte count is back to  normal.      -decrease prednisone by 5 mg every other week.    -monitor CBC every other week now  -if there is no response on steroids, will give course of rituximab again weekly x 4 doses.  She only wants to go to the [facility] clinic.  She has had response to prednisone, so can hold off on rituximab.    -she is on Bactrim for prophylaxis  -RTC with me in 8 weeks    2) CVID:   -continue IVIG.  She gets it, if IgG is <600.  It's been about every other month.  She will get a dose today.  -IgG check and IVIG every 8 weeks if meets parameters    3) Mouth sores: likely due to prednisone  -she is using salt rinses, which helps  -will also try Magic Mouthwash      Sabina Boyd MD  Hematology/Oncology  [facility]        Total time spent on day of visit, including review of tests, obtaining/reviewing separately obtained history, ordering medications/tests/procedures, communicating with PCP/consultants, and documenting in electronic medical record: 30 minutes        Again, thank you for allowing me to participate in the care of your patient.        Sincerely,        Sabina Boyd MD

## 2021-08-03 NOTE — PROGRESS NOTES
[facility]    Hematology/Oncology Established Patient Follow-up Note      Today's Date: 8/03/21    Reason for Follow-up: Hemolytic anemia-autoimmune; IgA deficiency    HISTORY OF PRESENT ILLNESS: Tricia Sosa is a 80 year old female who presents with autoimmune hemolytic anemia and IgA deficiency.  She previously saw Dr. Matos and then Dr. Summers.  She was previously seen at [facility].    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  She was very sick at the time and had severe diarrhea.  She lost about 40 pounds in weight.  The workup revealed that she had markedly diminished CD4 counts of 48 and was diagnosed with CMV colitis.  Since then she has been followed in hematology clinic at [place] until recently.  She was noted to have anemia which was fairly long-standing.  She was initially referred for anemia in 2004 and also had a bone marrow biopsy done at that time which revealed hypercellular bone marrow with 70-80% cellularity and normal trilineage hematopoiesis and no morphologic features of MDS or lymphoproliferation.  Her anemia was attributed to her chronic underlying disease.  At the next evaluation in 2002 on 4 aggressive anemia there was no evidence of hemolysis, iron deficiency, B12 or folate deficiency.  Bone marrow biopsy was not pursued.  In summer of 2015 she had progressive fatigue, dyspnea on exertion and worsening anemia with a hemoglobin now of 9.  Workup at this time did suggest a hemolytic anemia with elevated LDH at 709, undetectable haptoglobin and elevated unconjugated bilirubin at 3.3.  Monospecific MARIKA was positive for both IgG and complement.  Cold agglutinin screen was positive with very low titer 1:64.  She was started on prednisone 60 mg daily with supplementation of iron folate and B12 starting 7/31/15.  Her prednisone has been slowly tapered and was discontinued off in January 2016.  She was last followed at [facility]  on March 10, 2016 when she had stable hemoglobin.    She was followed by Dr. Matos and Dr. Summers.  Due to relapse of hemolytic anemia, she underwent another course of prednisone that has since been tapered off and rituximab x 4 weekly doses completed in 9/19/19.    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  She started weekly Rituxan on 7/31/20 x 4 doses, completed on 8/21/20.  She tapered off of prednisone in October 2020.    Due to relapse of her AIHA, she started prednisone again on 6/8/21 at 60 mg daily with slow taper.      INTERIM HISTORY: Tricia is doing well.  She is down to prednisone 15 mg daily now, as of 7/28/21.   She notes that she has had mouth sores since starting prednisone.  She notes some easy bruising on her arms when she gets lab draws.  She has difficulty sleeping when she is on the higher dose prednisone, but that is getting better now that she is down to lower doses.          REVIEW OF SYSTEMS:   14 point ROS was reviewed and is negative other than as noted above in HPI.       HOME MEDICATIONS:  Current Outpatient Medications   Medication Sig Dispense Refill     cyanocobalamin (VITAMIN  B-12) 1000 MCG tablet   3     folic acid (FOLVITE) 1 MG tablet   3     hydrochlorothiazide (HYDRODIURIL) 12.5 MG tablet Take 1 tablet (12.5 mg) by mouth daily as needed (edema) 30 tablet 5     hydrocortisone butyrate (LOCOID) 0.1 % SOLN solution Apply to scalp bid prn 60 mL 11     ketoconazole (NIZORAL) 2 % external shampoo Use every other day to scalp as needed 120 mL 11     levothyroxine (SYNTHROID/LEVOTHROID) 150 MCG tablet Take 1 tablet (150 mcg) by mouth daily 90 tablet 3     magic mouthwash suspension, diphenhydrAMINE, lidocaine, aluminum-magnesium & simethicone, (FIRST-MOUTHWASH BLM) compounding kit Swish and swallow 5-10 mLs in mouth every 6 hours as needed for mouth sores 237 mL 1     predniSONE (DELTASONE) 20 MG tablet Take 1 tablet (20 mg)  by mouth daily 30 tablet 0     predniSONE (DELTASONE) 5 MG tablet Take 4 tablets (20 mg) by mouth daily 120 tablet 0     sulfamethoxazole-trimethoprim (BACTRIM DS) 800-160 MG tablet Take 1 pill orally on Mon, Wed and Friday 45 tablet 3     triamcinolone (KENALOG) 0.1 % external cream        UNABLE TO FIND privigen inj every two months           ALLERGIES:  Allergies   Allergen Reactions     Doxycycline          PAST MEDICAL HISTORY:  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, and posterior vitreous detachment     Urinary tract infection, site not specified     Recurrent UTI's         PAST SURGICAL HISTORY:  Past Surgical History:   Procedure Laterality Date     C LIGATE FALLOPIAN TUBE       COLONOSCOPY N/A 4/26/2016    Procedure: COLONOSCOPY;  Surgeon: Dontae Del Rio MD;  Location:  GI     ENT SURGERY  1985    Thyroid lobectomy     EYE SURGERY Bilateral 04/20/2021    Cataract Surgery      CYSTOSCOPY,INSERT URETERAL STENT      Ureteral stent insertion      FLEX SIGMOIDOSCOPY W BIOPSY  5/30/00      LAPAROSCOPY, SURGICAL; CHOLECYSTECTOMY  1992      THYROIDECTOMY       LIGATION OF HEMORRHOID(S)      Hemorrhoidectomy     UPPER GI ENDOSCOPY,BIOPSY  10/01/01     [facility] COLONOSCOPY W BIOPSY  4/26/00     [facility] COLONOSCOPY W BIOPSY  10/8/03     [facility] COLONOSCOPY W BIOPSY  6/27/05    REPEAT IN 5 YEARS         SOCIAL HISTORY:  Social History     Socioeconomic  History     Marital status:      Spouse name: Not on file     Number of children: Not on file     Years of education: Not on file     Highest education level: Not on file   Occupational History     Not on file   Tobacco Use     Smoking status: Never Smoker     Smokeless tobacco: Never Used   Substance and Sexual Activity     Alcohol use: Yes     Alcohol/week: 0.0 standard drinks     Comment: 1 a day at most     Drug use: No     Sexual activity: Not Currently     Partners: Male   Other Topics Concern     Parent/sibling w/ CABG, MI or angioplasty before 65F 55M? No   Social History Narrative     Not on file     Social Determinants of Health     Financial Resource Strain:      Difficulty of Paying Living Expenses:    Food Insecurity:      Worried About Running Out of Food in the Last Year:      Ran Out of Food in the Last Year:    Transportation Needs:      Lack of Transportation (Medical):      Lack of Transportation (Non-Medical):    Physical Activity:      Days of Exercise per Week:      Minutes of Exercise per Session:    Stress:      Feeling of Stress :    Social Connections:      Frequency of Communication with Friends and Family:      Frequency of Social Gatherings with Friends and Family:      Attends Yarsani Services:      Active Member of Clubs or Organizations:      Attends Club or Organization Meetings:      Marital Status:    Intimate Partner Violence: Not At Risk     Fear of Current or Ex-Partner: No     Emotionally Abused: No     Physically Abused: No     Sexually Abused: No         FAMILY HISTORY:  Family History   Problem Relation Age of Onset     Colon Cancer Mother 90     Cerebrovascular Disease Father          at age 85     Dementia Brother      Prostate Cancer Brother      Thyroid Disease Brother      Dementia Brother      Thyroid Disease Brother      Pancreatic Cancer Brother      Breast Cancer Sister      Hyperlipidemia Sister      Depression Sister      Anxiety Disorder Sister       Thyroid Disease Sister      Breast Cancer Sister      Colon Cancer Niece      Other Cancer Niece         leukemia         PHYSICAL EXAM:  BP (!) 148/60   Pulse 75   Temp 97.8  F (36.6  C) (Tympanic)   Resp 16   Ht 1.829 m (6')   Wt 70.1 kg (154 lb 8 oz)   LMP  (LMP Unknown)   SpO2 99%   BMI 20.95 kg/m    ECO  GENERAL/CONSTITUTIONAL: No acute distress.  EYES: No scleral icterus.  NEUROLOGIC: Alert, oriented, answers questions appropriately.  INTEGUMENTARY: No jaundice.  GAIT: Uses cane.        LABS:  CBC RESULTS: Recent Labs   Lab Test 21  0801   WBC 4.0   RBC 3.70*   HGB 9.8*   HCT 32.4*   MCV 88   MCH 26.5   MCHC 30.2*   RDW 13.8            ASSESSMENT/PLAN:  Tricia Sosa is a 80 year old female with:      1. 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 Common variable immunodeficiency disorder  4. Splenomegaly        1) Autoimmune hemolytic anemia: She had relapse in 2020, and started on prednisone, as well as weekly rituximab infusions x 4, completed on 20.  Her hemoglobin slowly improved back up to her baseline and has been stable.  She tapered off of prednisone as of early 2020.  She had another relapse, and stated on prednisone again on 21.  She has had good response in hemoglobin back to her baseline with prednisone alone, and is now on taper.      We also previously talked about other treatment options if her disease becomes refractory to steroids, such as splenectomy.      She is currently on prednisone 15 mg daily as of 21.  Hemoglobin is down slightly to 9.8, but reticulocyte count is back to normal.      -decrease prednisone by 5 mg every other week.    -monitor CBC every other week now  -if there is no response on steroids, will give course of rituximab again weekly x 4 doses.  She only wants to go to the [facility].  She has had response to prednisone,  so can hold off on rituximab.    -she is on Bactrim for prophylaxis  -RTC with me in 8 weeks    2) CVID:   -continue IVIG.  She gets it, if IgG is <600.  It's been about every other month.  She will get a dose today.  -IgG check and IVIG every 8 weeks if meets parameters    3) Mouth sores: likely due to prednisone  -she is using salt rinses, which helps  -will also try Magic Mouthwash      Sabina Boyd MD  Hematology/Oncology  [facility]        Total time spent on day of visit, including review of tests, obtaining/reviewing separately obtained history, ordering medications/tests/procedures, communicating with PCP/consultants, and documenting in electronic medical record: 30 minutes

## 2021-08-03 NOTE — PROGRESS NOTES
Infusion Nursing Note:  Tricia Sosa presents today for labs and PIV start for treatment.    Patient seen by provider today: Yes: Oliver   present during visit today: Not Applicable.    Note: N/A.      Intravenous Access:  Lab draw site LAC, Needle type butterfly, Gauge 23.  Labs drawn without difficulty.  Peripheral IV placed.    Treatment Conditions:  Not Applicable.      Post Infusion Assessment:  Patient tolerated procedure without incident.       Discharge Plan:   To clinic for MD appointment.      STACY CHEATHAM RN

## 2021-08-03 NOTE — NURSING NOTE
Oncology Rooming Note    August 3, 2021 8:11 AM   Tricia Sosa is a 80 year old female who presents for:    Chief Complaint   Patient presents with     Oncology Clinic Visit     Other autoimmune hemolytic anemia     Initial Vitals: BP (!) 148/60   Pulse 75   Temp 97.8  F (36.6  C) (Tympanic)   Resp 16   Ht 1.829 m (6')   Wt 70.1 kg (154 lb 8 oz)   LMP  (LMP Unknown)   SpO2 99%   BMI 20.95 kg/m   Estimated body mass index is 20.95 kg/m  as calculated from the following:    Height as of this encounter: 1.829 m (6').    Weight as of this encounter: 70.1 kg (154 lb 8 oz). Body surface area is 1.89 meters squared.  No Pain (0) Comment: Data Unavailable   No LMP recorded (lmp unknown). Patient is postmenopausal.  Allergies reviewed: Yes  Medications reviewed: Yes    Medications: Medication refills not needed today.  Pharmacy name entered into [facility]:    [place], [place] - [address] [id number] [place], [place] - [address]    Clinical concerns: follow up       Angy Villagran, MOHSEN

## 2021-08-06 NOTE — PATIENT INSTRUCTIONS
Labs scheduled 8/17, 8/31, 9/14, 9/23  Appt with Dr. Boyd and IVIG scheduled 9/28  Anabell Hawkins RN on 8/6/2021 at 12:06 PM

## 2021-08-17 ENCOUNTER — LAB (OUTPATIENT)
Dept: ONCOLOGY | Facility: CLINIC | Age: 81
End: 2021-08-17
Attending: INTERNAL MEDICINE
Payer: MEDICARE

## 2021-08-17 ENCOUNTER — HOSPITAL ENCOUNTER (OUTPATIENT)
Facility: CLINIC | Age: 81
Discharge: HOME | End: 2021-08-17
Attending: INTERNAL MEDICINE | Admitting: INTERNAL MEDICINE
Payer: MEDICARE

## 2021-08-17 DIAGNOSIS — D59.19 OTHER AUTOIMMUNE HEMOLYTIC ANEMIA (H): ICD-10-CM

## 2021-08-17 LAB
BASOPHILS # BLD AUTO: 0 10E3/UL (ref 0–0.2)
BASOPHILS NFR BLD AUTO: 0 %
EOSINOPHIL # BLD AUTO: 0 10E3/UL (ref 0–0.7)
EOSINOPHIL NFR BLD AUTO: 0 %
ERYTHROCYTE [DISTWIDTH] IN BLOOD BY AUTOMATED COUNT: 13.7 % (ref 10–15)
HCT VFR BLD AUTO: 34.5 % (ref 35–47)
HGB BLD-MCNC: 10.2 G/DL (ref 11.7–15.7)
IMM GRANULOCYTES # BLD: 0 10E3/UL
IMM GRANULOCYTES NFR BLD: 0 %
LYMPHOCYTES # BLD AUTO: 1.5 10E3/UL (ref 0.8–5.3)
LYMPHOCYTES NFR BLD AUTO: 38 %
MCH RBC QN AUTO: 25.5 PG (ref 26.5–33)
MCHC RBC AUTO-ENTMCNC: 29.6 G/DL (ref 31.5–36.5)
MCV RBC AUTO: 86 FL (ref 78–100)
MONOCYTES # BLD AUTO: 0.6 10E3/UL (ref 0–1.3)
MONOCYTES NFR BLD AUTO: 14 %
NEUTROPHILS # BLD AUTO: 1.9 10E3/UL (ref 1.6–8.3)
NEUTROPHILS NFR BLD AUTO: 48 %
NRBC # BLD AUTO: 0 10E3/UL
NRBC BLD AUTO-RTO: 0 /100
PLATELET # BLD AUTO: 128 10E3/UL (ref 150–450)
RBC # BLD AUTO: 4 10E6/UL (ref 3.8–5.2)
WBC # BLD AUTO: 4.1 10E3/UL (ref 4–11)

## 2021-08-17 PROCEDURE — 85041 AUTOMATED RBC COUNT: CPT | Performed by: INTERNAL MEDICINE

## 2021-08-17 PROCEDURE — 36415 COLL VENOUS BLD VENIPUNCTURE: CPT

## 2021-08-31 ENCOUNTER — LAB (OUTPATIENT)
Dept: ONCOLOGY | Facility: CLINIC | Age: 81
End: 2021-08-31
Attending: INTERNAL MEDICINE
Payer: MEDICARE

## 2021-08-31 DIAGNOSIS — D59.19 OTHER AUTOIMMUNE HEMOLYTIC ANEMIA (H): ICD-10-CM

## 2021-08-31 LAB
BASOPHILS # BLD AUTO: 0 10E3/UL (ref 0–0.2)
BASOPHILS NFR BLD AUTO: 0 %
EOSINOPHIL # BLD AUTO: 0 10E3/UL (ref 0–0.7)
EOSINOPHIL NFR BLD AUTO: 0 %
ERYTHROCYTE [DISTWIDTH] IN BLOOD BY AUTOMATED COUNT: 15 % (ref 10–15)
HCT VFR BLD AUTO: 36 % (ref 35–47)
HGB BLD-MCNC: 10.5 G/DL (ref 11.7–15.7)
IMM GRANULOCYTES # BLD: 0 10E3/UL
IMM GRANULOCYTES NFR BLD: 0 %
LYMPHOCYTES # BLD AUTO: 1.7 10E3/UL (ref 0.8–5.3)
LYMPHOCYTES NFR BLD AUTO: 34 %
MCH RBC QN AUTO: 25.5 PG (ref 26.5–33)
MCHC RBC AUTO-ENTMCNC: 29.2 G/DL (ref 31.5–36.5)
MCV RBC AUTO: 88 FL (ref 78–100)
MONOCYTES # BLD AUTO: 0.5 10E3/UL (ref 0–1.3)
MONOCYTES NFR BLD AUTO: 11 %
NEUTROPHILS # BLD AUTO: 2.8 10E3/UL (ref 1.6–8.3)
NEUTROPHILS NFR BLD AUTO: 55 %
NRBC # BLD AUTO: 0 10E3/UL
NRBC BLD AUTO-RTO: 0 /100
PLATELET # BLD AUTO: 171 10E3/UL (ref 150–450)
RBC # BLD AUTO: 4.11 10E6/UL (ref 3.8–5.2)
WBC # BLD AUTO: 5.1 10E3/UL (ref 4–11)

## 2021-08-31 PROCEDURE — 36415 COLL VENOUS BLD VENIPUNCTURE: CPT

## 2021-08-31 PROCEDURE — 85004 AUTOMATED DIFF WBC COUNT: CPT | Performed by: INTERNAL MEDICINE

## 2021-08-31 NOTE — PROGRESS NOTES
Medical Assistant Note:  Tricia Sosa presents today for blood draw.    Patient seen by provider today: No.   present during visit today: Not Applicable.    Concerns: No Concerns.    Procedure:  Lab draw site: right antecub, Needle type: butterfly, Gauge: 23.    Post Assessment:  Labs drawn without difficulty: Yes.    Discharge Plan:  Departure Mode: Ambulatory.    Face to Face Time: 10.    Barbara Corrigan, CMA

## 2021-09-14 ENCOUNTER — LAB (OUTPATIENT)
Dept: ONCOLOGY | Facility: CLINIC | Age: 81
End: 2021-09-14
Attending: INTERNAL MEDICINE
Payer: MEDICARE

## 2021-09-14 DIAGNOSIS — D59.19 OTHER AUTOIMMUNE HEMOLYTIC ANEMIA (H): ICD-10-CM

## 2021-09-14 LAB
BASOPHILS # BLD AUTO: 0 10E3/UL (ref 0–0.2)
BASOPHILS NFR BLD AUTO: 0 %
EOSINOPHIL # BLD AUTO: 0 10E3/UL (ref 0–0.7)
EOSINOPHIL NFR BLD AUTO: 0 %
ERYTHROCYTE [DISTWIDTH] IN BLOOD BY AUTOMATED COUNT: 16.5 % (ref 10–15)
HCT VFR BLD AUTO: 31.7 % (ref 35–47)
HGB BLD-MCNC: 9.7 G/DL (ref 11.7–15.7)
IMM GRANULOCYTES # BLD: 0 10E3/UL
IMM GRANULOCYTES NFR BLD: 0 %
LYMPHOCYTES # BLD AUTO: 1.7 10E3/UL (ref 0.8–5.3)
LYMPHOCYTES NFR BLD AUTO: 39 %
MCH RBC QN AUTO: 27.1 PG (ref 26.5–33)
MCHC RBC AUTO-ENTMCNC: 30.6 G/DL (ref 31.5–36.5)
MCV RBC AUTO: 89 FL (ref 78–100)
MONOCYTES # BLD AUTO: 0.5 10E3/UL (ref 0–1.3)
MONOCYTES NFR BLD AUTO: 10 %
NEUTROPHILS # BLD AUTO: 2.2 10E3/UL (ref 1.6–8.3)
NEUTROPHILS NFR BLD AUTO: 51 %
NRBC # BLD AUTO: 0 10E3/UL
NRBC BLD AUTO-RTO: 0 /100
PLATELET # BLD AUTO: 138 10E3/UL (ref 150–450)
RBC # BLD AUTO: 3.58 10E6/UL (ref 3.8–5.2)
WBC # BLD AUTO: 4.3 10E3/UL (ref 4–11)

## 2021-09-14 PROCEDURE — 36415 COLL VENOUS BLD VENIPUNCTURE: CPT

## 2021-09-14 PROCEDURE — 85041 AUTOMATED RBC COUNT: CPT | Performed by: INTERNAL MEDICINE

## 2021-09-18 ENCOUNTER — HEALTH MAINTENANCE LETTER (OUTPATIENT)
Age: 81
End: 2021-09-18

## 2021-09-23 ENCOUNTER — LAB (OUTPATIENT)
Dept: ONCOLOGY | Facility: CLINIC | Age: 81
End: 2021-09-23
Attending: INTERNAL MEDICINE
Payer: MEDICARE

## 2021-09-23 ENCOUNTER — PATIENT OUTREACH (OUTPATIENT)
Dept: ONCOLOGY | Facility: CLINIC | Age: 81
End: 2021-09-23

## 2021-09-23 DIAGNOSIS — D59.19 OTHER AUTOIMMUNE HEMOLYTIC ANEMIA (H): ICD-10-CM

## 2021-09-23 DIAGNOSIS — G47.33 OBSTRUCTIVE SLEEP APNEA (ADULT) (PEDIATRIC): Primary | ICD-10-CM

## 2021-09-23 DIAGNOSIS — G47.14 HYPERSOMNIA DUE TO MEDICAL CONDITION: ICD-10-CM

## 2021-09-23 LAB
ALBUMIN SERPL-MCNC: 3.8 G/DL (ref 3.4–5)
ALP SERPL-CCNC: 99 U/L (ref 40–150)
ALT SERPL W P-5'-P-CCNC: 23 U/L (ref 0–50)
ANION GAP SERPL CALCULATED.3IONS-SCNC: 6 MMOL/L (ref 3–14)
AST SERPL W P-5'-P-CCNC: 29 U/L (ref 0–45)
BASOPHILS # BLD AUTO: 0 10E3/UL (ref 0–0.2)
BASOPHILS NFR BLD AUTO: 1 %
BILIRUB SERPL-MCNC: 0.9 MG/DL (ref 0.2–1.3)
BUN SERPL-MCNC: 10 MG/DL (ref 7–30)
CALCIUM SERPL-MCNC: 8.3 MG/DL (ref 8.5–10.1)
CHLORIDE BLD-SCNC: 108 MMOL/L (ref 94–109)
CO2 SERPL-SCNC: 26 MMOL/L (ref 20–32)
CREAT SERPL-MCNC: 0.74 MG/DL (ref 0.52–1.04)
EOSINOPHIL # BLD AUTO: 0 10E3/UL (ref 0–0.7)
EOSINOPHIL NFR BLD AUTO: 1 %
ERYTHROCYTE [DISTWIDTH] IN BLOOD BY AUTOMATED COUNT: 16.4 % (ref 10–15)
GFR SERPL CREATININE-BSD FRML MDRD: 77 ML/MIN/1.73M2
GLUCOSE BLD-MCNC: 130 MG/DL (ref 70–99)
HCT VFR BLD AUTO: 32.3 % (ref 35–47)
HGB BLD-MCNC: 9.9 G/DL (ref 11.7–15.7)
IMM GRANULOCYTES # BLD: 0 10E3/UL
IMM GRANULOCYTES NFR BLD: 1 %
LDH SERPL L TO P-CCNC: 292 U/L (ref 81–234)
LYMPHOCYTES # BLD AUTO: 1.6 10E3/UL (ref 0.8–5.3)
LYMPHOCYTES NFR BLD AUTO: 35 %
MCH RBC QN AUTO: 27.1 PG (ref 26.5–33)
MCHC RBC AUTO-ENTMCNC: 30.7 G/DL (ref 31.5–36.5)
MCV RBC AUTO: 89 FL (ref 78–100)
MONOCYTES # BLD AUTO: 0.5 10E3/UL (ref 0–1.3)
MONOCYTES NFR BLD AUTO: 11 %
NEUTROPHILS # BLD AUTO: 2.3 10E3/UL (ref 1.6–8.3)
NEUTROPHILS NFR BLD AUTO: 51 %
NRBC # BLD AUTO: 0 10E3/UL
NRBC BLD AUTO-RTO: 0 /100
PLATELET # BLD AUTO: 155 10E3/UL (ref 150–450)
POTASSIUM BLD-SCNC: 4 MMOL/L (ref 3.4–5.3)
PROT SERPL-MCNC: 6.3 G/DL (ref 6.8–8.8)
RBC # BLD AUTO: 3.65 10E6/UL (ref 3.8–5.2)
RETICS # AUTO: 0.09 10E6/UL (ref 0.03–0.1)
RETICS/RBC NFR AUTO: 2.5 % (ref 0.5–2)
SODIUM SERPL-SCNC: 140 MMOL/L (ref 133–144)
WBC # BLD AUTO: 4.4 10E3/UL (ref 4–11)

## 2021-09-23 PROCEDURE — 36415 COLL VENOUS BLD VENIPUNCTURE: CPT

## 2021-09-23 PROCEDURE — 83010 ASSAY OF HAPTOGLOBIN QUANT: CPT | Performed by: INTERNAL MEDICINE

## 2021-09-23 PROCEDURE — 83615 LACTATE (LD) (LDH) ENZYME: CPT | Performed by: INTERNAL MEDICINE

## 2021-09-23 PROCEDURE — 85045 AUTOMATED RETICULOCYTE COUNT: CPT | Performed by: INTERNAL MEDICINE

## 2021-09-23 PROCEDURE — 82784 ASSAY IGA/IGD/IGG/IGM EACH: CPT | Performed by: INTERNAL MEDICINE

## 2021-09-23 PROCEDURE — 82040 ASSAY OF SERUM ALBUMIN: CPT | Performed by: INTERNAL MEDICINE

## 2021-09-23 PROCEDURE — 85004 AUTOMATED DIFF WBC COUNT: CPT | Performed by: INTERNAL MEDICINE

## 2021-09-23 NOTE — PROGRESS NOTES
"Tricia called in to clinic reporting yesterday around 5 pm she started developing a sore throat. She is scheduled for labs today at 11 am and is wondering if she is safe to come in or if she should be COVID tested.     She denies a fever. Temp yesterday and today was 98.2 and 98.1 respectively.     She reports she normally runs around 97 degrees.     She denies any cough or any other symptoms.    Her son has also had a cold for a few days and is currently getting COVID tested.     Tricia also expressed concern about getting \"that drug\" for immunocompromised patients quickly. When writer asked her to clarify, pt stated \"that drug that Damian Burger recommends.\" She reports she is concerned she needs to get it as soon as possible.    Writer will discuss with Dr. Boyd and call pt back.    Anabell Hawkins, RN, BSN, COLLEEN  RN Care Coordinator  [facility]  [phone]      "

## 2021-09-23 NOTE — PROGRESS NOTES
Sabina Boyd MD  You;  Cancer Clinic Rn Pool 26 minutes ago (8:58 AM)     FELICIA    I assume she's referring to monoclonal antibodies.  I believe she would have to be COVID positive to get it.  I think we are referring patients to their PCP for these situations.    Message text      Sabina Boyd MD You 11 minutes ago (9:13 AM)     FELICIA    If her only symptom is a sore throat, I think she can come in with mask.  I don't think she needs COVID test unless she has more symptoms, such as cough or fever.      Message text      Writer called Tricia and explained the above information. Tricia reports she does live with her son and daughter-in-law but they don't always cross paths. Tricia reports her daughter-in-law is very sick with a cold and her son is getting tested for COVID. Writer instructed pt to wear a mask at today's lab appt and if she develops any additional symptoms she may need to get COVID tested. If she has a pending COVID test then we will need the results before she comes back to clinic in person. Tricia verbalized understanding and is in agreement with the plan.     Anabell Hawkins, RN, BSN, COLLEEN  RN Care Coordinator  [facility]  [phone]

## 2021-09-24 LAB
HAPTOGLOB SERPL-MCNC: <3 MG/DL (ref 32–197)
IGA SERPL-MCNC: <2 MG/DL (ref 84–499)
IGG SERPL-MCNC: 424 MG/DL (ref 610–1616)
IGM SERPL-MCNC: 13 MG/DL (ref 35–242)

## 2021-09-27 ENCOUNTER — PATIENT OUTREACH (OUTPATIENT)
Dept: ONCOLOGY | Facility: CLINIC | Age: 81
End: 2021-09-27

## 2021-09-27 NOTE — PROGRESS NOTES
Tricia called in to clinic reporting she still has a slight cold and just wanted to make sure she is ok to come in tomorrow for her appt with Dr. Boyd and her IVIG.     Tricia reports her temp is back down to her normal range of 97 degrees, she is occasionally blowing her nose, and occasional nonproductive cough.     Tricia reports her son and daughter-in-law also have colds. Her son has tested negative for COVID and her daughter-in-law has a pending COVID test.     Writer encouraged her to call us if her daughter-in-law's COVID test comes back positive, otherwise she should be okay to come in to clinic tomorrow as long as she stays masked. And as long as she doesn't develop any new or worsening symptoms.     Tricia verbalized understanding and is in agreement with the plan.     Anabell Hawkins, RN, BSN, COLLEEN  RN Care Coordinator  [facility]  [phone]

## 2021-09-28 ENCOUNTER — INFUSION THERAPY VISIT (OUTPATIENT)
Dept: INFUSION THERAPY | Facility: CLINIC | Age: 81
End: 2021-09-28
Attending: INTERNAL MEDICINE
Payer: MEDICARE

## 2021-09-28 ENCOUNTER — ONCOLOGY VISIT (OUTPATIENT)
Dept: ONCOLOGY | Facility: CLINIC | Age: 81
End: 2021-09-28
Attending: INTERNAL MEDICINE
Payer: MEDICARE

## 2021-09-28 VITALS
DIASTOLIC BLOOD PRESSURE: 63 MMHG | HEIGHT: 72 IN | WEIGHT: 156.6 LBS | BODY MASS INDEX: 21.21 KG/M2 | RESPIRATION RATE: 16 BRPM | OXYGEN SATURATION: 97 % | HEART RATE: 77 BPM | SYSTOLIC BLOOD PRESSURE: 129 MMHG | TEMPERATURE: 99.3 F

## 2021-09-28 DIAGNOSIS — D83.9 CVID (COMMON VARIABLE IMMUNODEFICIENCY) (H): Primary | ICD-10-CM

## 2021-09-28 DIAGNOSIS — R30.0 DYSURIA: Primary | ICD-10-CM

## 2021-09-28 DIAGNOSIS — D59.10 AUTOIMMUNE HEMOLYTIC ANEMIA (H): ICD-10-CM

## 2021-09-28 DIAGNOSIS — R30.0 DYSURIA: ICD-10-CM

## 2021-09-28 LAB
ALBUMIN UR-MCNC: 20 MG/DL
APPEARANCE UR: CLEAR
BACTERIA #/AREA URNS HPF: ABNORMAL /HPF
BILIRUB UR QL STRIP: NEGATIVE
COLOR UR AUTO: YELLOW
GLUCOSE UR STRIP-MCNC: NEGATIVE MG/DL
HGB UR QL STRIP: NEGATIVE
HYALINE CASTS: 3 /LPF
KETONES UR STRIP-MCNC: NEGATIVE MG/DL
LEUKOCYTE ESTERASE UR QL STRIP: ABNORMAL
MUCOUS THREADS #/AREA URNS LPF: PRESENT /LPF
NITRATE UR QL: NEGATIVE
PH UR STRIP: 7 [PH] (ref 5–7)
RBC URINE: 0 /HPF
SP GR UR STRIP: 1.02 (ref 1–1.03)
UROBILINOGEN UR STRIP-MCNC: 2 MG/DL
WBC URINE: 75 /HPF

## 2021-09-28 PROCEDURE — 96375 TX/PRO/DX INJ NEW DRUG ADDON: CPT

## 2021-09-28 PROCEDURE — 250N000011 HC RX IP 250 OP 636: Performed by: INTERNAL MEDICINE

## 2021-09-28 PROCEDURE — G0463 HOSPITAL OUTPT CLINIC VISIT: HCPCS | Mod: 25

## 2021-09-28 PROCEDURE — 96365 THER/PROPH/DIAG IV INF INIT: CPT

## 2021-09-28 PROCEDURE — 81001 URINALYSIS AUTO W/SCOPE: CPT | Performed by: INTERNAL MEDICINE

## 2021-09-28 PROCEDURE — 87086 URINE CULTURE/COLONY COUNT: CPT | Performed by: INTERNAL MEDICINE

## 2021-09-28 PROCEDURE — 99214 OFFICE O/P EST MOD 30 MIN: CPT | Performed by: INTERNAL MEDICINE

## 2021-09-28 PROCEDURE — 96366 THER/PROPH/DIAG IV INF ADDON: CPT

## 2021-09-28 RX ORDER — CIPROFLOXACIN 500 MG/1
500 TABLET, FILM COATED ORAL 2 TIMES DAILY
Qty: 10 TABLET | Refills: 0 | Status: SHIPPED | OUTPATIENT
Start: 2021-09-28 | End: 2021-10-03

## 2021-09-28 RX ORDER — HEPARIN SODIUM,PORCINE 10 UNIT/ML
5 VIAL (ML) INTRAVENOUS
Status: CANCELLED | OUTPATIENT
Start: 2021-09-28

## 2021-09-28 RX ORDER — HEPARIN SODIUM (PORCINE) LOCK FLUSH IV SOLN 100 UNIT/ML 100 UNIT/ML
5 SOLUTION INTRAVENOUS
Status: CANCELLED | OUTPATIENT
Start: 2021-09-28

## 2021-09-28 RX ORDER — DIPHENHYDRAMINE HCL 25 MG
50 CAPSULE ORAL ONCE
Status: CANCELLED | OUTPATIENT
Start: 2021-09-28

## 2021-09-28 RX ORDER — ACETAMINOPHEN 325 MG/1
650 TABLET ORAL ONCE
Status: CANCELLED
Start: 2021-09-28

## 2021-09-28 RX ADMIN — HYDROCORTISONE SODIUM SUCCINATE 100 MG: 100 INJECTION, POWDER, FOR SOLUTION INTRAMUSCULAR; INTRAVENOUS at 09:36

## 2021-09-28 RX ADMIN — HUMAN IMMUNOGLOBULIN G 35 G: 20 LIQUID INTRAVENOUS at 09:42

## 2021-09-28 ASSESSMENT — MIFFLIN-ST. JEOR: SCORE: 1292.33

## 2021-09-28 ASSESSMENT — PAIN SCALES - GENERAL: PAINLEVEL: SEVERE PAIN (6)

## 2021-09-28 NOTE — LETTER
9/28/2021         RE: Tricia Sosa  [address]  [place] [facility] [place] [id number]        Dear Colleague,    Thank you for referring your patient, Tricia Sosa, to the [facility]. Please see a copy of my visit note below.    [facility]    Hematology/Oncology Established Patient Follow-up Note      Today's Date: 9/28/21    Reason for Follow-up: Hemolytic anemia-autoimmune; IgA deficiency    HISTORY OF PRESENT ILLNESS: Tricia Sosa is a 80 year old female who presents with autoimmune hemolytic anemia and IgA deficiency.  She previously saw Dr. Matos and then Dr. Summers.  She was previously seen at [facility].    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  She was very sick at the time and had severe diarrhea.  She lost about 40 pounds in weight.  The workup revealed that she had markedly diminished CD4 counts of 48 and was diagnosed with CMV colitis.  Since then she has been followed in hematology clinic at [place] until recently.  She was noted to have anemia which was fairly long-standing.  She was initially referred for anemia in 2004 and also had a bone marrow biopsy done at that time which revealed hypercellular bone marrow with 70-80% cellularity and normal trilineage hematopoiesis and no morphologic features of MDS or lymphoproliferation.  Her anemia was attributed to her chronic underlying disease.  At the next evaluation in 2002 on 4 aggressive anemia there was no evidence of hemolysis, iron deficiency, B12 or folate deficiency.  Bone marrow biopsy was not pursued.  In summer of 2015 she had progressive fatigue, dyspnea on exertion and worsening anemia with a hemoglobin now of 9.  Workup at this time did suggest a hemolytic anemia with elevated LDH at 709, undetectable haptoglobin and elevated unconjugated bilirubin at 3.3.  Monospecific MARIKA was positive for both IgG and complement.  Cold agglutinin screen  was positive with very low titer 1:64.  She was started on prednisone 60 mg daily with supplementation of iron folate and B12 starting 7/31/15.  Her prednisone has been slowly tapered and was discontinued off in January 2016.  She was last [facility] on March 10, 2016 when she had stable hemoglobin.    She was followed by Dr. Matos and Dr. Summers.  Due to relapse of hemolytic anemia, she underwent another course of prednisone that has since been tapered off and rituximab x 4 weekly doses completed in 9/19/19.    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  She started weekly Rituxan on 7/31/20 x 4 doses, completed on 8/21/20.  She tapered off of prednisone in October 2020.    Due to relapse of her AIHA, she started prednisone again on 6/8/21 at 60 mg daily with slow taper and finished taper in August 2021.      INTERIM HISTORY: Tricia says that she has been doing okay, but she felt short of breath this morning while coming in to clinic.  She notes that her son and daughter-in-law, whom she lives with, have had colds.  Her son tested negative for COVID.  Her daughter-in-law's test is pending.   She notes that she had burning with urination this morning, and would like to check for urine infection.      REVIEW OF SYSTEMS:   14 point ROS was reviewed and is negative other than as noted above in HPI.       HOME MEDICATIONS:  Current Outpatient Medications   Medication Sig Dispense Refill     cyanocobalamin (VITAMIN  B-12) 1000 MCG tablet   3     Flaxseed, Linseed, (FLAX SEEDS PO) Take by mouth daily       folic acid (FOLVITE) 1 MG tablet   3     hydrochlorothiazide (HYDRODIURIL) 12.5 MG tablet Take 1 tablet (12.5 mg) by mouth daily as needed (edema) 30 tablet 5     hydrocortisone butyrate (LOCOID) 0.1 % SOLN solution Apply to scalp bid prn 60 mL 11     ketoconazole (NIZORAL) 2 % external shampoo Use every other day to scalp as needed 120 mL 11      levothyroxine (SYNTHROID/LEVOTHROID) 150 MCG tablet Take 1 tablet (150 mcg) by mouth daily 90 tablet 3     predniSONE (DELTASONE) 20 MG tablet Take 1 tablet (20 mg) by mouth daily 30 tablet 0     sulfamethoxazole-trimethoprim (BACTRIM DS) 800-160 MG tablet Take 1 pill orally on Mon, Wed and Friday 45 tablet 3     triamcinolone (KENALOG) 0.1 % external cream        UNABLE TO FIND 2 times daily MEDICATION NAME: Pro Omega Nordic Naturals - 650 EPA/450 DHA  1280 mg Omega 3       UNABLE TO FIND privigen inj every two months       magic mouthwash suspension, diphenhydrAMINE, lidocaine, aluminum-magnesium & simethicone, (FIRST-MOUTHWASH BLM) compounding kit Swish and swallow 5-10 mLs in mouth every 6 hours as needed for mouth sores 237 mL 1         ALLERGIES:  Allergies   Allergen Reactions     Doxycycline          PAST MEDICAL HISTORY:  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, and posterior vitreous detachment     Urinary tract infection, site not specified     Recurrent UTI's         PAST SURGICAL HISTORY:  Past Surgical History:   Procedure Laterality Date     C LIGATE FALLOPIAN TUBE       COLONOSCOPY N/A 4/26/2016    Procedure: COLONOSCOPY;  Surgeon: Dontae Del Rio MD;  Location:  GI     ENT SURGERY  1985    Thyroid lobectomy     EYE SURGERY Bilateral 04/20/2021    Cataract Surgery     HC CYSTOSCOPY,INSERT URETERAL STENT      Ureteral stent insertion      HC FLEX SIGMOIDOSCOPY W BIOPSY  5/30/00      LAPAROSCOPY, SURGICAL; CHOLECYSTECTOMY  1992      THYROIDECTOMY       LIGATION OF HEMORRHOID(S)      Hemorrhoidectomy     UPPER GI ENDOSCOPY,BIOPSY  10/01/01     [facility] COLONOSCOPY W BIOPSY  4/26/00     [facility] COLONOSCOPY W BIOPSY  10/8/03     [facility] COLONOSCOPY W BIOPSY  6/27/05    REPEAT IN 5 YEARS         SOCIAL HISTORY:  Social History     Socioeconomic History     Marital status:      Spouse name: Not on file     Number of children: Not on file     Years of education: Not on file     Highest education level: Not on file   Occupational History     Not on file   Tobacco Use     Smoking status: Never Smoker     Smokeless tobacco: Never Used   Substance and Sexual Activity     Alcohol use: Yes     Alcohol/week: 0.0 standard drinks     Comment: 1 a day at most     Drug use: No     Sexual activity: Not Currently     Partners: Male   Other Topics Concern     Parent/sibling w/ CABG, MI or angioplasty before 65F 55M? No   Social History Narrative     Not on file     Social Determinants of Health     Financial Resource Strain:      Difficulty of Paying Living Expenses:    Food Insecurity:      Worried About Running Out of Food in the Last Year:      Ran Out of Food in the Last Year:    Transportation Needs:      Lack of Transportation (Medical):      Lack of Transportation (Non-Medical):    Physical Activity:      Days of Exercise per Week:      Minutes of Exercise per Session:    Stress:      Feeling of Stress :    Social Connections:      Frequency of Communication with Friends and Family:      Frequency of Social Gatherings with Friends and Family:      Attends Evangelical Services:      Active Member of Clubs or Organizations:      Attends Club or Organization Meetings:      Marital Status:    Intimate Partner Violence: Not At Risk     Fear of Current or Ex-Partner: No     Emotionally Abused: No     Physically Abused: No     Sexually Abused: No         FAMILY  HISTORY:  Family History   Problem Relation Age of Onset     Colon Cancer Mother 90     Cerebrovascular Disease Father          at age 85     Dementia Brother      Prostate Cancer Brother      Thyroid Disease Brother      Dementia Brother      Thyroid Disease Brother      Pancreatic Cancer Brother      Breast Cancer Sister      Hyperlipidemia Sister      Depression Sister      Anxiety Disorder Sister      Thyroid Disease Sister      Breast Cancer Sister      Colon Cancer Niece      Other Cancer Niece         leukemia         PHYSICAL EXAM:  /63   Pulse 77   Temp 99.3  F (37.4  C) (Oral)   Resp 16   Ht 1.829 m (6')   Wt 71 kg (156 lb 9.6 oz)   LMP  (LMP Unknown)   SpO2 97%   BMI 21.24 kg/m    ECO  GENERAL/CONSTITUTIONAL: No acute distress.  EYES: No scleral icterus.  RESPIRATORY: Clear to auscultation bilaterally.    NEUROLOGIC: Alert, oriented, answers questions appropriately.  INTEGUMENTARY: No jaundice.  GAIT: Uses cane.        LABS:  CBC RESULTS: Recent Labs   Lab Test 21  1102   WBC 4.4   RBC 3.65*   HGB 9.9*   HCT 32.3*   MCV 89   MCH 27.1   MCHC 30.7*   RDW 16.4*        Recent Labs   Lab Test 21  1102 21  0801    138   POTASSIUM 4.0 3.7   CHLORIDE 108 106   CO2 26 28   ANIONGAP 6 4   * 97   BUN 10 16   CR 0.74 0.73   CULLEN 8.3* 8.5     Lab Results   Component Value Date    AST 29 2021    AST 30 2021     Lab Results   Component Value Date    ALT 23 2021    ALT 36 2021     No results found for: BILICONJ   Lab Results   Component Value Date    BILITOTAL 0.9 2021    BILITOTAL 0.8 2021     Lab Results   Component Value Date    ALBUMIN 3.8 2021    ALBUMIN 3.9 2021     Lab Results   Component Value Date    PROTTOTAL 6.3 2021    PROTTOTAL 6.4 2021      Lab Results   Component Value Date    ALKPHOS 99 2021    ALKPHOS 97 2021     Component      Latest Ref Rng & Units 2021   IGG       610-1,616 mg/dL 424 (L)   IGA      84 - 499 mg/dL <2 (L)   IGM      35 - 242 mg/dL 13 (L)   % Retic      0.5 - 2.0 % 2.5 (H)   Absolute Retic      0.025 - 0.095 10e6/uL 0.092   Haptoglobin      32 - 197 mg/dL <3 (L)   Lactate Dehydrogenase      81 - 234 U/L 292 (H)         ASSESSMENT/PLAN:  Tricia Sosa is a 80 year old female with:      1. 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 Common variable immunodeficiency disorder  4. Splenomegaly        1) Autoimmune hemolytic anemia: She had relapse in July 2020, and started on prednisone, as well as weekly rituximab infusions x 4, completed on 8/21/20.  Her hemoglobin slowly improved back up to her baseline and has been stable.  She tapered off of prednisone as of early October 2020.  She had another relapse, and stated on prednisone again on 6/8/21.  She has had good response in hemoglobin back to her baseline with prednisone alone, and has finished taper in August 2021.    We also previously talked about other treatment options if her disease becomes refractory to steroids, such as splenectomy.      Hemoglobin has remained stable at 9.9.    -she has finished prednisone taper.  -monitor CBC monthly now  -if there is no response on steroids, will give course of rituximab again weekly x 4 doses.  She only wants to go to [facility].  She has had response to prednisone, so can hold off on rituximab.    -she is on Bactrim for prophylaxis when she is on prednisone  -RTC with me in 8 weeks    2) CVID:   -continue IVIG.  She gets it, if IgG is <600.  It's been about every other month.  She will get a dose today.  -IgG check and IVIG every 8 weeks if meets parameters    3) Dysuria:  -will check UA/UC today    4) Mild shortness of breath with exertion: We discussed further evaluation, such as chest x-ray, repeat CBC.  However, since her symptoms are mild, and she feels  okay now, she would like to monitor first.  If symptoms worsen, such as worsening shortness of breath, chest pain, fever, she is to let us know or go to the ED.      Sabina Boyd MD  Hematology/Oncology  [facility]        Total time spent on day of visit, including review of tests, obtaining/reviewing separately obtained history, ordering medications/tests/procedures, communicating with PCP/consultants, and documenting in electronic medical record: 30 minutes        Again, thank you for allowing me to participate in the care of your patient.        Sincerely,        Sabina Boyd MD

## 2021-09-28 NOTE — PROGRESS NOTES
[facility]    Hematology/Oncology Established Patient Follow-up Note      Today's Date: 9/28/21    Reason for Follow-up: Hemolytic anemia-autoimmune; IgA deficiency    HISTORY OF PRESENT ILLNESS: Tricia Sosa is a 80 year old female who presents with autoimmune hemolytic anemia and IgA deficiency.  She previously saw Dr. Matos and then Dr. Summers.  She was previously seen at [facility].    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  She was very sick at the time and had severe diarrhea.  She lost about 40 pounds in weight.  The workup revealed that she had markedly diminished CD4 counts of 48 and was diagnosed with CMV colitis.  Since then she has been followed in hematology clinic at [place] until recently.  She was noted to have anemia which was fairly long-standing.  She was initially referred for anemia in 2004 and also had a bone marrow biopsy done at that time which revealed hypercellular bone marrow with 70-80% cellularity and normal trilineage hematopoiesis and no morphologic features of MDS or lymphoproliferation.  Her anemia was attributed to her chronic underlying disease.  At the next evaluation in 2002 on 4 aggressive anemia there was no evidence of hemolysis, iron deficiency, B12 or folate deficiency.  Bone marrow biopsy was not pursued.  In summer of 2015 she had progressive fatigue, dyspnea on exertion and worsening anemia with a hemoglobin now of 9.  Workup at this time did suggest a hemolytic anemia with elevated LDH at 709, undetectable haptoglobin and elevated unconjugated bilirubin at 3.3.  Monospecific MARIKA was positive for both IgG and complement.  Cold agglutinin screen was positive with very low titer 1:64.  She was started on prednisone 60 mg daily with supplementation of iron folate and B12 starting 7/31/15.  Her prednisone has been slowly tapered and was discontinued off in January 2016.  She was last followed at [facility]  on March 10, 2016 when she had stable hemoglobin.    She was followed by Dr. Matos and Dr. Summers.  Due to relapse of hemolytic anemia, she underwent another course of prednisone that has since been tapered off and rituximab x 4 weekly doses completed in 9/19/19.    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  She started weekly Rituxan on 7/31/20 x 4 doses, completed on 8/21/20.  She tapered off of prednisone in October 2020.    Due to relapse of her AIHA, she started prednisone again on 6/8/21 at 60 mg daily with slow taper and finished taper in August 2021.      INTERIM HISTORY: Tricia says that she has been doing okay, but she felt short of breath this morning while coming in to clinic.  She notes that her son and daughter-in-law, whom she lives with, have had colds.  Her son tested negative for COVID.  Her daughter-in-law's test is pending.   She notes that she had burning with urination this morning, and would like to check for urine infection.      REVIEW OF SYSTEMS:   14 point ROS was reviewed and is negative other than as noted above in HPI.       HOME MEDICATIONS:  Current Outpatient Medications   Medication Sig Dispense Refill     cyanocobalamin (VITAMIN  B-12) 1000 MCG tablet   3     Flaxseed, Linseed, (FLAX SEEDS PO) Take by mouth daily       folic acid (FOLVITE) 1 MG tablet   3     hydrochlorothiazide (HYDRODIURIL) 12.5 MG tablet Take 1 tablet (12.5 mg) by mouth daily as needed (edema) 30 tablet 5     hydrocortisone butyrate (LOCOID) 0.1 % SOLN solution Apply to scalp bid prn 60 mL 11     ketoconazole (NIZORAL) 2 % external shampoo Use every other day to scalp as needed 120 mL 11     levothyroxine (SYNTHROID/LEVOTHROID) 150 MCG tablet Take 1 tablet (150 mcg) by mouth daily 90 tablet 3     predniSONE (DELTASONE) 20 MG tablet Take 1 tablet (20 mg) by mouth daily 30 tablet 0     sulfamethoxazole-trimethoprim (BACTRIM DS) 800-160 MG tablet Take 1  pill orally on Mon, Wed and Friday 45 tablet 3     triamcinolone (KENALOG) 0.1 % external cream        UNABLE TO FIND 2 times daily MEDICATION NAME: Pro Omega Nordic Naturals - 650 EPA/450 DHA  1280 mg Omega 3       UNABLE TO FIND privigen inj every two months       magic mouthwash suspension, diphenhydrAMINE, lidocaine, aluminum-magnesium & simethicone, (FIRST-MOUTHWASH BLM) compounding kit Swish and swallow 5-10 mLs in mouth every 6 hours as needed for mouth sores 237 mL 1         ALLERGIES:  Allergies   Allergen Reactions     Doxycycline          PAST MEDICAL HISTORY:  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, and posterior vitreous detachment     Urinary tract infection, site not specified     Recurrent UTI's         PAST SURGICAL HISTORY:  Past Surgical History:   Procedure Laterality Date     C LIGATE FALLOPIAN TUBE       COLONOSCOPY N/A 4/26/2016    Procedure: COLONOSCOPY;  Surgeon: Dotnae Del Rio MD;  Location:  GI     ENT SURGERY  1985    Thyroid lobectomy     EYE SURGERY Bilateral 04/20/2021    Cataract Surgery      CYSTOSCOPY,INSERT URETERAL STENT      Ureteral stent insertion     HC FLEX SIGMOIDOSCOPY W BIOPSY  5/30/00      LAPAROSCOPY, SURGICAL; CHOLECYSTECTOMY  1992      THYROIDECTOMY       LIGATION OF HEMORRHOID(S)      Hemorrhoidectomy     UPPER GI ENDOSCOPY,BIOPSY  10/01/01     [facility] COLONOSCOPY W BIOPSY  4/26/00     [facility]  COLONOSCOPY W BIOPSY  10/8/03     [facility] COLONOSCOPY W BIOPSY  05    REPEAT IN 5 YEARS         SOCIAL HISTORY:  Social History     Socioeconomic History     Marital status:      Spouse name: Not on file     Number of children: Not on file     Years of education: Not on file     Highest education level: Not on file   Occupational History     Not on file   Tobacco Use     Smoking status: Never Smoker     Smokeless tobacco: Never Used   Substance and Sexual Activity     Alcohol use: Yes     Alcohol/week: 0.0 standard drinks     Comment: 1 a day at most     Drug use: No     Sexual activity: Not Currently     Partners: Male   Other Topics Concern     Parent/sibling w/ CABG, MI or angioplasty before 65F 55M? No   Social History Narrative     Not on file     Social Determinants of Health     Financial Resource Strain:      Difficulty of Paying Living Expenses:    Food Insecurity:      Worried About Running Out of Food in the Last Year:      Ran Out of Food in the Last Year:    Transportation Needs:      Lack of Transportation (Medical):      Lack of Transportation (Non-Medical):    Physical Activity:      Days of Exercise per Week:      Minutes of Exercise per Session:    Stress:      Feeling of Stress :    Social Connections:      Frequency of Communication with Friends and Family:      Frequency of Social Gatherings with Friends and Family:      Attends Temple Services:      Active Member of Clubs or Organizations:      Attends Club or Organization Meetings:      Marital Status:    Intimate Partner Violence: Not At Risk     Fear of Current or Ex-Partner: No     Emotionally Abused: No     Physically Abused: No     Sexually Abused: No         FAMILY HISTORY:  Family History   Problem Relation Age of Onset     Colon Cancer Mother 90     Cerebrovascular Disease Father          at age 85     Dementia Brother      Prostate Cancer Brother      Thyroid Disease Brother      Dementia Brother      Thyroid Disease  Brother      Pancreatic Cancer Brother      Breast Cancer Sister      Hyperlipidemia Sister      Depression Sister      Anxiety Disorder Sister      Thyroid Disease Sister      Breast Cancer Sister      Colon Cancer Niece      Other Cancer Niece         leukemia         PHYSICAL EXAM:  /63   Pulse 77   Temp 99.3  F (37.4  C) (Oral)   Resp 16   Ht 1.829 m (6')   Wt 71 kg (156 lb 9.6 oz)   LMP  (LMP Unknown)   SpO2 97%   BMI 21.24 kg/m    ECO  GENERAL/CONSTITUTIONAL: No acute distress.  EYES: No scleral icterus.  RESPIRATORY: Clear to auscultation bilaterally.    NEUROLOGIC: Alert, oriented, answers questions appropriately.  INTEGUMENTARY: No jaundice.  GAIT: Uses cane.        LABS:  CBC RESULTS: Recent Labs   Lab Test 21  1102   WBC 4.4   RBC 3.65*   HGB 9.9*   HCT 32.3*   MCV 89   MCH 27.1   MCHC 30.7*   RDW 16.4*        Recent Labs   Lab Test 21  1102 21  0801    138   POTASSIUM 4.0 3.7   CHLORIDE 108 106   CO2 26 28   ANIONGAP 6 4   * 97   BUN 10 16   CR 0.74 0.73   CULLEN 8.3* 8.5     Lab Results   Component Value Date    AST 29 2021    AST 30 2021     Lab Results   Component Value Date    ALT 23 2021    ALT 36 2021     No results found for: BILICONJ   Lab Results   Component Value Date    BILITOTAL 0.9 2021    BILITOTAL 0.8 2021     Lab Results   Component Value Date    ALBUMIN 3.8 2021    ALBUMIN 3.9 2021     Lab Results   Component Value Date    PROTTOTAL 6.3 2021    PROTTOTAL 6.4 2021      Lab Results   Component Value Date    ALKPHOS 99 2021    ALKPHOS 97 2021     Component      Latest Ref Rng & Units 2021   IGG      610-1,616 mg/dL 424 (L)   IGA      84 - 499 mg/dL <2 (L)   IGM      35 - 242 mg/dL 13 (L)   % Retic      0.5 - 2.0 % 2.5 (H)   Absolute Retic      0.025 - 0.095 10e6/uL 0.092   Haptoglobin      32 - 197 mg/dL <3 (L)   Lactate Dehydrogenase      81 - 234 U/L 292 (H)          ASSESSMENT/PLAN:  Tricia Sosa is a 80 year old female with:      1. 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 Common variable immunodeficiency disorder  4. Splenomegaly        1) Autoimmune hemolytic anemia: She had relapse in July 2020, and started on prednisone, as well as weekly rituximab infusions x 4, completed on 8/21/20.  Her hemoglobin slowly improved back up to her baseline and has been stable.  She tapered off of prednisone as of early October 2020.  She had another relapse, and stated on prednisone again on 6/8/21.  She has had good response in hemoglobin back to her baseline with prednisone alone, and has finished taper in August 2021.    We also previously talked about other treatment options if her disease becomes refractory to steroids, such as splenectomy.      Hemoglobin has remained stable at 9.9.    -she has finished prednisone taper.  -monitor CBC monthly now  -if there is no response on steroids, will give course of rituximab again weekly x 4 doses.  She only wants to go to the [facility].  She has had response to prednisone, so can hold off on rituximab.    -she is on Bactrim for prophylaxis when she is on prednisone  -RTC with me in 8 weeks    2) CVID:   -continue IVIG.  She gets it, if IgG is <600.  It's been about every other month.  She will get a dose today.  -IgG check and IVIG every 8 weeks if meets parameters    3) Dysuria:  -will check UA/UC today    ADDENDUM: UA does show suspicion for UTI.  Will send ciprofloxacin.  Urine culture pending.      4) Mild shortness of breath with exertion: We discussed further evaluation, such as chest x-ray, repeat CBC.  However, since her symptoms are mild, and she feels okay now, she would like to monitor first.  If symptoms worsen, such as worsening shortness of breath, chest pain, fever, she is to let us know or go to the  [facility].      Sabina Boyd MD  Hematology/Oncology  [facility]        Total time spent on day of visit, including review of tests, obtaining/reviewing separately obtained history, ordering medications/tests/procedures, communicating with PCP/consultants, and documenting in electronic medical record: 30 minutes

## 2021-09-28 NOTE — NURSING NOTE
Oncology Rooming Note    September 28, 2021 8:51 AM   Tricia Sosa is a 80 year old female who presents for:    Chief Complaint   Patient presents with     Oncology Clinic Visit     autoimmune hemolytic anemia      Initial Vitals: /63   Pulse 77   Temp 99.3  F (37.4  C) (Oral)   Resp 16   Ht 1.829 m (6')   Wt 71 kg (156 lb 9.6 oz)   LMP  (LMP Unknown)   SpO2 97%   BMI 21.24 kg/m   Estimated body mass index is 21.24 kg/m  as calculated from the following:    Height as of this encounter: 1.829 m (6').    Weight as of this encounter: 71 kg (156 lb 9.6 oz). Body surface area is 1.9 meters squared.  Severe Pain (6) Comment: Data Unavailable   No LMP recorded (lmp unknown). Patient is postmenopausal.  Allergies reviewed: Yes  Medications reviewed: Yes    Medications: Medication refills not needed today.  Pharmacy name entered into [employer]:    [place], [place] - [address] [id number] [place], [place] - [address] [address]    Clinical concerns: follow up - pt reported SOB with exertion this am.       Montserrat Hutchins, CMA

## 2021-09-28 NOTE — PROGRESS NOTES
Infusion Nursing Note:  Tricia Sosa presents today for IVIG.    Patient seen by provider today: Yes: Dr. Boyd   present during visit today: Not Applicable.    Note: N/A.      Intravenous Access:  Peripheral IV placed.    Treatment Conditions:  Biological Infusion Checklist:  ~~~ NOTE: If the patient answers yes to any of the questions below, hold the infusion and contact ordering provider or on-call provider.    1. Have you recently had an elevated temperature, fever, chills, productive cough, coughing for 3 weeks or longer or hemoptysis, abnormal vital signs, night sweats,  chest pain or have you noticed a decrease in your appetite, unexplained weight loss or fatigue? No  2. Do you have any open wounds or new incisions? No  3. Do you have any recent or upcoming hospitalizations, surgeries or dental procedures? No  4. Do you currently have or recently have had any signs of illness or infection or are you on any antibiotics? No  5. Have you had any new, sudden or worsening abdominal pain? No  6. Have you or anyone in your household received a live vaccination in the past 4 weeks? Please note:  No live vaccines while on biologic/chemotherapy until 6 months after the last treatment.  Patient can receive the flu vaccine (shot only) and the pneumovax.  It is optimal for the patient to get these vaccines mid cycle, but they can be given at any time as long as it is not on the day of the infusion. No  7. Have you recently been diagnosed with any new nervous system diseases (ie. Multiple sclerosis, Guillain Triangle, seizures, neurological changes) or cancer diagnosis? No  8. Are you on any form of radiation or chemotherapy? No  9. Are you pregnant or breast feeding or do you have plans of pregnancy in the future? No  10. Have you been having any signs of worsening depression or suicidal ideations?  (benlysta only) No  11. Have there been any other new onset medical symptoms? No        Post Infusion  Assessment:  Patient tolerated infusion without incident.  Blood return noted pre and post infusion.  Site patent and intact, free from redness, edema or discomfort.  No evidence of extravasations.  Access discontinued per protocol.       Discharge Plan:   Discharge instructions reviewed with: Patient.  Patient and/or family verbalized understanding of discharge instructions and all questions answered.  Copy of AVS reviewed with patient and/or family.  Patient will call to schedule next appt.   Patient discharged in stable condition accompanied by: self.  Departure Mode: Ambulatory.      Tricia Iyer RN

## 2021-09-29 LAB — BACTERIA UR CULT: ABNORMAL

## 2021-09-30 ENCOUNTER — PATIENT OUTREACH (OUTPATIENT)
Dept: ONCOLOGY | Facility: CLINIC | Age: 81
End: 2021-09-30

## 2021-09-30 NOTE — PROGRESS NOTES
Tricia called in to clinic reporting she noticed the urine culture results are back. She just wants to make sure there are no adjustments needed with her Ciprofloxacin.     Per chart review, UC reported E. Coli is cipro susceptible. Pt had been prescribed 500 mg 2 times daily for 5 days.     Writer will check with Dr. Boyd to see if that duration is still good and call Tricia if there will be any changes.     Anabell Hawkins, RN, BSN, COLLEEN  RN Care Coordinator  [facility]  [phone]

## 2021-09-30 NOTE — TELEPHONE ENCOUNTER
Signed Prescriptions:                        Disp   Refills    predniSONE (DELTASONE) 5 MG tablet         120 ta*0        Sig: Take 4 tablets (20 mg) by mouth daily  Authorizing Provider: LEON DALLAS

## 2021-09-30 NOTE — PROGRESS NOTES
Sabina Boyd MD  You;  Cancer Clinic Rn Pool 4 minutes ago (1:43 PM)     FELICIA    It was sensitivity to cipro, so no changes needed.  Thanks    Message text      No changes needed so no call needed to patient.     Anabell Hawkins, RN, BSN, DIANNEM  RN Care Coordinator  [facility]  [phone]

## 2021-09-30 NOTE — PATIENT INSTRUCTIONS
Labs scheduled 10/25, 11/22  Appt with Trinidad scheduled 11/23  IVIG scheduled 11/23  Anabell BATES RN on 9/29/2021 at 11:06 PM

## 2021-10-25 ENCOUNTER — LAB (OUTPATIENT)
Dept: ONCOLOGY | Facility: CLINIC | Age: 81
End: 2021-10-25
Attending: INTERNAL MEDICINE
Payer: MEDICARE

## 2021-10-25 ENCOUNTER — TELEPHONE (OUTPATIENT)
Dept: INTERNAL MEDICINE | Facility: CLINIC | Age: 81
End: 2021-10-25

## 2021-10-25 DIAGNOSIS — D59.19 OTHER AUTOIMMUNE HEMOLYTIC ANEMIA (H): ICD-10-CM

## 2021-10-25 LAB
ALBUMIN SERPL-MCNC: 3.8 G/DL (ref 3.4–5)
ALP SERPL-CCNC: 92 U/L (ref 40–150)
ALT SERPL W P-5'-P-CCNC: 21 U/L (ref 0–50)
ANION GAP SERPL CALCULATED.3IONS-SCNC: 4 MMOL/L (ref 3–14)
AST SERPL W P-5'-P-CCNC: 40 U/L (ref 0–45)
BASOPHILS # BLD AUTO: 0 10E3/UL (ref 0–0.2)
BASOPHILS NFR BLD AUTO: 1 %
BILIRUB SERPL-MCNC: 1.2 MG/DL (ref 0.2–1.3)
BUN SERPL-MCNC: 13 MG/DL (ref 7–30)
CALCIUM SERPL-MCNC: 8.5 MG/DL (ref 8.5–10.1)
CHLORIDE BLD-SCNC: 109 MMOL/L (ref 94–109)
CO2 SERPL-SCNC: 27 MMOL/L (ref 20–32)
CREAT SERPL-MCNC: 0.71 MG/DL (ref 0.52–1.04)
EOSINOPHIL # BLD AUTO: 0.1 10E3/UL (ref 0–0.7)
EOSINOPHIL NFR BLD AUTO: 2 %
ERYTHROCYTE [DISTWIDTH] IN BLOOD BY AUTOMATED COUNT: 16.7 % (ref 10–15)
GFR SERPL CREATININE-BSD FRML MDRD: 80 ML/MIN/1.73M2
GLUCOSE BLD-MCNC: 121 MG/DL (ref 70–99)
HCT VFR BLD AUTO: 28.7 % (ref 35–47)
HGB BLD-MCNC: 8.7 G/DL (ref 11.7–15.7)
IMM GRANULOCYTES # BLD: 0.1 10E3/UL
IMM GRANULOCYTES NFR BLD: 2 %
LDH SERPL L TO P-CCNC: 534 U/L (ref 81–234)
LYMPHOCYTES # BLD AUTO: 1.2 10E3/UL (ref 0.8–5.3)
LYMPHOCYTES NFR BLD AUTO: 32 %
MCH RBC QN AUTO: 29 PG (ref 26.5–33)
MCHC RBC AUTO-ENTMCNC: 30.3 G/DL (ref 31.5–36.5)
MCV RBC AUTO: 96 FL (ref 78–100)
MONOCYTES # BLD AUTO: 0.4 10E3/UL (ref 0–1.3)
MONOCYTES NFR BLD AUTO: 12 %
NEUTROPHILS # BLD AUTO: 1.9 10E3/UL (ref 1.6–8.3)
NEUTROPHILS NFR BLD AUTO: 51 %
NRBC # BLD AUTO: 0 10E3/UL
NRBC BLD AUTO-RTO: 0 /100
PLATELET # BLD AUTO: 127 10E3/UL (ref 150–450)
POTASSIUM BLD-SCNC: 4 MMOL/L (ref 3.4–5.3)
PROT SERPL-MCNC: 6.3 G/DL (ref 6.8–8.8)
RBC # BLD AUTO: 3 10E6/UL (ref 3.8–5.2)
RETICS # AUTO: 0.17 10E6/UL (ref 0.03–0.1)
RETICS/RBC NFR AUTO: 5.8 % (ref 0.5–2)
SODIUM SERPL-SCNC: 140 MMOL/L (ref 133–144)
WBC # BLD AUTO: 3.8 10E3/UL (ref 4–11)

## 2021-10-25 PROCEDURE — 82040 ASSAY OF SERUM ALBUMIN: CPT | Performed by: INTERNAL MEDICINE

## 2021-10-25 PROCEDURE — 83615 LACTATE (LD) (LDH) ENZYME: CPT | Performed by: INTERNAL MEDICINE

## 2021-10-25 PROCEDURE — 85025 COMPLETE CBC W/AUTO DIFF WBC: CPT | Performed by: INTERNAL MEDICINE

## 2021-10-25 PROCEDURE — 36415 COLL VENOUS BLD VENIPUNCTURE: CPT

## 2021-10-25 PROCEDURE — 83010 ASSAY OF HAPTOGLOBIN QUANT: CPT | Performed by: INTERNAL MEDICINE

## 2021-10-25 PROCEDURE — 82784 ASSAY IGA/IGD/IGG/IGM EACH: CPT | Performed by: INTERNAL MEDICINE

## 2021-10-25 PROCEDURE — 85045 AUTOMATED RETICULOCYTE COUNT: CPT | Performed by: INTERNAL MEDICINE

## 2021-10-25 NOTE — TELEPHONE ENCOUNTER
Do you want an Evisit?   TSH   Date Value Ref Range Status   06/22/2021 0.46 0.40 - 4.00 mU/L Final     Elias Webster RN, BSN

## 2021-10-25 NOTE — TELEPHONE ENCOUNTER
Her TSH was fine.  When there is not enough hormone, the TSH is elevated.  When there is too much, the TSH is less than 0.2.  She should make an appointment to do an assessment as it may not have anything to do with her thyroid.

## 2021-10-25 NOTE — TELEPHONE ENCOUNTER
Pt states that she may be back in hemolytic anemia which could be causing her symptoms if pcp does not feel it is her TSH. She has follow up lab work next week for oncology.    FYI to PCP  Elias Webster RN, BSN

## 2021-10-25 NOTE — TELEPHONE ENCOUNTER
Pt calling for TSH lab orders. She stated that she has been experiencing fatigue and some periodic shortness of breath. She also said that her last TSH lab work was borderline low. She also has low hemoglobin but this is being monitored by her Hematologist. Pt can be reached at [phone]. Please advise. Thanks.

## 2021-10-26 LAB
HAPTOGLOB SERPL-MCNC: <3 MG/DL (ref 32–197)
IGA SERPL-MCNC: <2 MG/DL (ref 84–499)
IGG SERPL-MCNC: 592 MG/DL (ref 610–1616)
IGM SERPL-MCNC: 12 MG/DL (ref 35–242)

## 2021-10-28 ENCOUNTER — ALLIED HEALTH/NURSE VISIT (OUTPATIENT)
Dept: INTERNAL MEDICINE | Facility: CLINIC | Age: 81
End: 2021-10-28
Payer: MEDICARE

## 2021-10-28 DIAGNOSIS — Z23 NEED FOR PROPHYLACTIC VACCINATION AND INOCULATION AGAINST INFLUENZA: Primary | ICD-10-CM

## 2021-10-28 PROCEDURE — 99207 PR NO CHARGE NURSE ONLY: CPT

## 2021-10-28 PROCEDURE — G0008 ADMIN INFLUENZA VIRUS VAC: HCPCS

## 2021-10-28 PROCEDURE — 90714 TD VACC NO PRESV 7 YRS+ IM: CPT

## 2021-10-28 PROCEDURE — 90472 IMMUNIZATION ADMIN EACH ADD: CPT

## 2021-10-28 PROCEDURE — 90662 IIV NO PRSV INCREASED AG IM: CPT

## 2021-11-01 ENCOUNTER — LAB (OUTPATIENT)
Dept: ONCOLOGY | Facility: CLINIC | Age: 81
End: 2021-11-01
Attending: INTERNAL MEDICINE
Payer: MEDICARE

## 2021-11-01 DIAGNOSIS — D59.19 OTHER AUTOIMMUNE HEMOLYTIC ANEMIA (H): ICD-10-CM

## 2021-11-01 LAB
BASOPHILS # BLD AUTO: 0 10E3/UL (ref 0–0.2)
BASOPHILS NFR BLD AUTO: 0 %
EOSINOPHIL # BLD AUTO: 0.1 10E3/UL (ref 0–0.7)
EOSINOPHIL NFR BLD AUTO: 2 %
ERYTHROCYTE [DISTWIDTH] IN BLOOD BY AUTOMATED COUNT: 17 % (ref 10–15)
HCT VFR BLD AUTO: 26.7 % (ref 35–47)
HGB BLD-MCNC: 7.9 G/DL (ref 11.7–15.7)
IMM GRANULOCYTES # BLD: 0.1 10E3/UL
IMM GRANULOCYTES NFR BLD: 2 %
LYMPHOCYTES # BLD AUTO: 1.2 10E3/UL (ref 0.8–5.3)
LYMPHOCYTES NFR BLD AUTO: 26 %
MCH RBC QN AUTO: 29.6 PG (ref 26.5–33)
MCHC RBC AUTO-ENTMCNC: 29.6 G/DL (ref 31.5–36.5)
MCV RBC AUTO: 100 FL (ref 78–100)
MONOCYTES # BLD AUTO: 0.5 10E3/UL (ref 0–1.3)
MONOCYTES NFR BLD AUTO: 10 %
NEUTROPHILS # BLD AUTO: 2.8 10E3/UL (ref 1.6–8.3)
NEUTROPHILS NFR BLD AUTO: 60 %
NRBC # BLD AUTO: 0 10E3/UL
NRBC BLD AUTO-RTO: 0 /100
PLATELET # BLD AUTO: 116 10E3/UL (ref 150–450)
RBC # BLD AUTO: 2.67 10E6/UL (ref 3.8–5.2)
WBC # BLD AUTO: 4.7 10E3/UL (ref 4–11)

## 2021-11-01 PROCEDURE — 36415 COLL VENOUS BLD VENIPUNCTURE: CPT

## 2021-11-01 PROCEDURE — 85025 COMPLETE CBC W/AUTO DIFF WBC: CPT | Performed by: INTERNAL MEDICINE

## 2021-11-01 NOTE — PROGRESS NOTES
Medical Assistant Note:  Tricia Sosa presents today for lab draw.    Patient seen by provider today: No.   present during visit today: Not Applicable.    Concerns: No Concerns.    Procedure:  Labs drawn: .    Post Assessment:  Labs drawn without difficulty: Yes.    Discharge Plan:  Departure Mode: Ambulatory.    Face to Face Time: 10.    Angy Villagran, [facility]

## 2021-11-02 ENCOUNTER — PATIENT OUTREACH (OUTPATIENT)
Dept: ONCOLOGY | Facility: CLINIC | Age: 81
End: 2021-11-02

## 2021-11-02 DIAGNOSIS — D59.19 OTHER AUTOIMMUNE HEMOLYTIC ANEMIA (H): Primary | ICD-10-CM

## 2021-11-02 RX ORDER — PREDNISONE 10 MG/1
70 TABLET ORAL DAILY
Qty: 150 TABLET | Refills: 0 | Status: SHIPPED | OUTPATIENT
Start: 2021-11-02 | End: 2021-11-19

## 2021-11-02 NOTE — PROGRESS NOTES
Oliver, Sabina Morocho MD  You;  Cancer Clinic Rn Pool 21 minutes ago (10:12 AM)     FELICIA    We should start on steroids again,since her hemoglobin is decreasing and she is symptomatic.  If she responds to steroids again, hopefully, we can avoid Rituxan.     If she  is agreeable to starting high dose steroids again with slow taper, I can send her prednisone prescription.  She should do weekly labs for now     Pt called and reviewed recommendations as noted per Dr Boyd. Pt states she is willing to restart steroids and would like prescription sent to [facility] pharmacy on [address] [address] in [place]. Pt states she would also like to schedule appt with Dr Boyd about possible splenectomy.  Reviewed upcoming appts and pt scheduled with Trinidad Liu on 11/23/2021 with IVIG.  Pt would like to cancel appt with Trinidad and see Dr Boyd on 11/16 and provided appt at 2:15 pm with labs prior.  Will also add weekly labs X 4 weeks on mondays or Tuesday after 10:30 am.    Reviewed side effects with prednisone and how to take medication and pt verbalized understanding of plan and will call back with further questions or concerns or symptoms that are not improving.    Dr Boyd sent prescription for prednisone 70 mg and will taper down slowly depending on labs.    Tanya Piper, RN, BSN, OCN

## 2021-11-02 NOTE — PROGRESS NOTES
Ref Range & Units 1 d ago     WBC Count 4.0 - 11.0 10e3/uL 4.7     RBC Count 3.80 - 5.20 10e6/uL 2.67Low      Hemoglobin 11.7 - 15.7 g/dL 7.9Low      Hematocrit 35.0 - 47.0 % 26.7Low      MCV 78 - 100 fL 100     MCH 26.5 - 33.0 pg 29.6     MCHC 31.5 - 36.5 g/dL 29.6Low      RDW 10.0 - 15.0 % 17.0High      Platelet Count 150 - 450 10e3/uL 116Low      % Neutrophils % 60     % Lymphocytes % 26     % Monocytes % 10     % Eosinophils % 2     % Basophils % 0     % Immature Granulocytes % 2     NRBCs per 100 WBC <1 /100 0     Absolute Neutrophils 1.6 - 8.3 10e3/uL 2.8     Absolute Lymphocytes 0.8 - 5.3 10e3/uL 1.2     Absolute Monocytes 0.0 - 1.3 10e3/uL 0.5     Absolute Eosinophils 0.0 - 0.7 10e3/uL 0.1     Absolute Basophils 0.0 - 0.2 10e3/uL 0.0     Absolute Immature Granulocytes <=0.0 10e3/uL 0.1High      Absolute NRBCs 10e3/uL 0.0    Resulting Agency  [facility]         Specimen Collected: 11/01/21  2:22 PM Last Resulted: 11/01/21  2:31 PM             Received call from pt who states she received cbc result from 11/1/2021 and Hgb continues to fall at 7.9.  Pt notes increased Sob with activity.  Pt is hoping she will not have to restart Rituxan but would like to know next steps.    Informed pt that writer will notify Dr Boyd for further recommendations, pt aware.    Tanya Piper, RN, BSN, OCN

## 2021-11-04 ENCOUNTER — TELEPHONE (OUTPATIENT)
Dept: ONCOLOGY | Facility: CLINIC | Age: 81
End: 2021-11-04

## 2021-11-04 DIAGNOSIS — D83.9 CVID (COMMON VARIABLE IMMUNODEFICIENCY) (H): Primary | ICD-10-CM

## 2021-11-04 RX ORDER — SULFAMETHOXAZOLE AND TRIMETHOPRIM 400; 80 MG/1; MG/1
1 TABLET ORAL DAILY
Qty: 90 TABLET | Refills: 0 | Status: SHIPPED | OUTPATIENT
Start: 2021-11-04 | End: 2021-12-21

## 2021-11-04 NOTE — TELEPHONE ENCOUNTER
Pt calling in to inquire if she should be taking her prednisone 70 mg (7 tablets) all at once or if she should split it up throughout the day. She said she had been taking all at once and had some trouble sleeping (more than usual).    She also notes that she had a tooth extracted yesterday morning (11/3). She is wondering if she should be doing anything in addition to her current care regimen since this tooth has been extracted    Will route to care team for advisement.

## 2021-11-04 NOTE — TELEPHONE ENCOUNTER
"Sabina Boyd MD  You; Anabell Hawkins, RN;  Cancer Clinic Rn Pool 3 minutes ago (11:32 AM)     Per Dr. Boyd: \"It would be best to take it all at once in the morning, as splitting it through the day may actually be worse for her sleep.     With her recent tooth extraction, and on high-dose prednisone, and immunocompromised state, I will send her prophylactic antibiotic to take for now.\"      Writer called pt to inform her of the recommendations of prednisone and to notify her that an antibiotic has been ordered for prophylactic purposes and sent to [facility] in [place]. Pt understands plan and in agreement.  "

## 2021-11-09 ENCOUNTER — LAB (OUTPATIENT)
Dept: ONCOLOGY | Facility: CLINIC | Age: 81
End: 2021-11-09
Attending: OBSTETRICS & GYNECOLOGY
Payer: MEDICARE

## 2021-11-09 DIAGNOSIS — D59.19 OTHER AUTOIMMUNE HEMOLYTIC ANEMIA (H): ICD-10-CM

## 2021-11-09 LAB
BASOPHILS # BLD AUTO: 0 10E3/UL (ref 0–0.2)
BASOPHILS NFR BLD AUTO: 0 %
EOSINOPHIL # BLD AUTO: 0 10E3/UL (ref 0–0.7)
EOSINOPHIL NFR BLD AUTO: 0 %
ERYTHROCYTE [DISTWIDTH] IN BLOOD BY AUTOMATED COUNT: 16.6 % (ref 10–15)
HCT VFR BLD AUTO: 28.5 % (ref 35–47)
HGB BLD-MCNC: 8.8 G/DL (ref 11.7–15.7)
IMM GRANULOCYTES # BLD: 0 10E3/UL
IMM GRANULOCYTES NFR BLD: 1 %
LYMPHOCYTES # BLD AUTO: 1.5 10E3/UL (ref 0.8–5.3)
LYMPHOCYTES NFR BLD AUTO: 24 %
MCH RBC QN AUTO: 31 PG (ref 26.5–33)
MCHC RBC AUTO-ENTMCNC: 30.9 G/DL (ref 31.5–36.5)
MCV RBC AUTO: 100 FL (ref 78–100)
MONOCYTES # BLD AUTO: 0.7 10E3/UL (ref 0–1.3)
MONOCYTES NFR BLD AUTO: 11 %
NEUTROPHILS # BLD AUTO: 4.2 10E3/UL (ref 1.6–8.3)
NEUTROPHILS NFR BLD AUTO: 64 %
NRBC # BLD AUTO: 0 10E3/UL
NRBC BLD AUTO-RTO: 0 /100
PLATELET # BLD AUTO: 180 10E3/UL (ref 150–450)
RBC # BLD AUTO: 2.84 10E6/UL (ref 3.8–5.2)
WBC # BLD AUTO: 6.5 10E3/UL (ref 4–11)

## 2021-11-09 PROCEDURE — 85025 COMPLETE CBC W/AUTO DIFF WBC: CPT | Performed by: INTERNAL MEDICINE

## 2021-11-09 PROCEDURE — 36415 COLL VENOUS BLD VENIPUNCTURE: CPT

## 2021-11-16 ENCOUNTER — ONCOLOGY VISIT (OUTPATIENT)
Dept: ONCOLOGY | Facility: CLINIC | Age: 81
End: 2021-11-16
Attending: INTERNAL MEDICINE
Payer: MEDICARE

## 2021-11-16 ENCOUNTER — VIRTUAL VISIT (OUTPATIENT)
Dept: INTERNAL MEDICINE | Facility: CLINIC | Age: 81
End: 2021-11-16
Payer: MEDICARE

## 2021-11-16 ENCOUNTER — LAB (OUTPATIENT)
Dept: ONCOLOGY | Facility: CLINIC | Age: 81
End: 2021-11-16
Attending: OBSTETRICS & GYNECOLOGY
Payer: MEDICARE

## 2021-11-16 VITALS
OXYGEN SATURATION: 98 % | HEIGHT: 72 IN | SYSTOLIC BLOOD PRESSURE: 139 MMHG | TEMPERATURE: 97.7 F | BODY MASS INDEX: 20.62 KG/M2 | HEART RATE: 65 BPM | RESPIRATION RATE: 16 BRPM | WEIGHT: 152.2 LBS | DIASTOLIC BLOOD PRESSURE: 67 MMHG

## 2021-11-16 DIAGNOSIS — D83.9 CVID (COMMON VARIABLE IMMUNODEFICIENCY) (H): ICD-10-CM

## 2021-11-16 DIAGNOSIS — E53.8 B12 DEFICIENCY: ICD-10-CM

## 2021-11-16 DIAGNOSIS — D59.19 OTHER AUTOIMMUNE HEMOLYTIC ANEMIA (H): ICD-10-CM

## 2021-11-16 DIAGNOSIS — M62.81 MUSCLE WEAKNESS (GENERALIZED): Primary | ICD-10-CM

## 2021-11-16 DIAGNOSIS — L30.9 DERMATITIS: ICD-10-CM

## 2021-11-16 LAB
ALBUMIN SERPL-MCNC: 4.3 G/DL (ref 3.4–5)
ALP SERPL-CCNC: 96 U/L (ref 40–150)
ALT SERPL W P-5'-P-CCNC: 21 U/L (ref 0–50)
ANION GAP SERPL CALCULATED.3IONS-SCNC: 7 MMOL/L (ref 3–14)
AST SERPL W P-5'-P-CCNC: 25 U/L (ref 0–45)
BASOPHILS # BLD AUTO: 0 10E3/UL (ref 0–0.2)
BASOPHILS NFR BLD AUTO: 0 %
BILIRUB SERPL-MCNC: 0.9 MG/DL (ref 0.2–1.3)
BUN SERPL-MCNC: 19 MG/DL (ref 7–30)
CALCIUM SERPL-MCNC: 8.8 MG/DL (ref 8.5–10.1)
CHLORIDE BLD-SCNC: 105 MMOL/L (ref 94–109)
CO2 SERPL-SCNC: 25 MMOL/L (ref 20–32)
CREAT SERPL-MCNC: 0.74 MG/DL (ref 0.52–1.04)
EOSINOPHIL # BLD AUTO: 0 10E3/UL (ref 0–0.7)
EOSINOPHIL NFR BLD AUTO: 0 %
ERYTHROCYTE [DISTWIDTH] IN BLOOD BY AUTOMATED COUNT: 14.7 % (ref 10–15)
GFR SERPL CREATININE-BSD FRML MDRD: 76 ML/MIN/1.73M2
GLUCOSE BLD-MCNC: 88 MG/DL (ref 70–99)
HCT VFR BLD AUTO: 30.8 % (ref 35–47)
HGB BLD-MCNC: 9.7 G/DL (ref 11.7–15.7)
IMM GRANULOCYTES # BLD: 0.1 10E3/UL
IMM GRANULOCYTES NFR BLD: 1 %
LDH SERPL L TO P-CCNC: 394 U/L (ref 81–234)
LYMPHOCYTES # BLD AUTO: 0.6 10E3/UL (ref 0.8–5.3)
LYMPHOCYTES NFR BLD AUTO: 8 %
MCH RBC QN AUTO: 30.2 PG (ref 26.5–33)
MCHC RBC AUTO-ENTMCNC: 31.5 G/DL (ref 31.5–36.5)
MCV RBC AUTO: 96 FL (ref 78–100)
MONOCYTES # BLD AUTO: 0.5 10E3/UL (ref 0–1.3)
MONOCYTES NFR BLD AUTO: 6 %
NEUTROPHILS # BLD AUTO: 6.6 10E3/UL (ref 1.6–8.3)
NEUTROPHILS NFR BLD AUTO: 85 %
NRBC # BLD AUTO: 0 10E3/UL
NRBC BLD AUTO-RTO: 0 /100
PLATELET # BLD AUTO: 215 10E3/UL (ref 150–450)
POTASSIUM BLD-SCNC: 4.2 MMOL/L (ref 3.4–5.3)
PROT SERPL-MCNC: 6.9 G/DL (ref 6.8–8.8)
RBC # BLD AUTO: 3.21 10E6/UL (ref 3.8–5.2)
RETICS # AUTO: 0.14 10E6/UL (ref 0.03–0.1)
RETICS/RBC NFR AUTO: 4.5 % (ref 0.5–2)
SODIUM SERPL-SCNC: 137 MMOL/L (ref 133–144)
WBC # BLD AUTO: 7.7 10E3/UL (ref 4–11)

## 2021-11-16 PROCEDURE — 99443 PR PHYSICIAN TELEPHONE EVALUATION 21-30 MIN: CPT | Mod: 95 | Performed by: INTERNAL MEDICINE

## 2021-11-16 PROCEDURE — 80053 COMPREHEN METABOLIC PANEL: CPT | Performed by: INTERNAL MEDICINE

## 2021-11-16 PROCEDURE — 85025 COMPLETE CBC W/AUTO DIFF WBC: CPT | Performed by: INTERNAL MEDICINE

## 2021-11-16 PROCEDURE — 83010 ASSAY OF HAPTOGLOBIN QUANT: CPT | Performed by: INTERNAL MEDICINE

## 2021-11-16 PROCEDURE — 82784 ASSAY IGA/IGD/IGG/IGM EACH: CPT | Performed by: INTERNAL MEDICINE

## 2021-11-16 PROCEDURE — 36415 COLL VENOUS BLD VENIPUNCTURE: CPT

## 2021-11-16 PROCEDURE — 83615 LACTATE (LD) (LDH) ENZYME: CPT | Performed by: INTERNAL MEDICINE

## 2021-11-16 PROCEDURE — 85045 AUTOMATED RETICULOCYTE COUNT: CPT | Performed by: INTERNAL MEDICINE

## 2021-11-16 PROCEDURE — G0463 HOSPITAL OUTPT CLINIC VISIT: HCPCS

## 2021-11-16 PROCEDURE — 99214 OFFICE O/P EST MOD 30 MIN: CPT | Performed by: INTERNAL MEDICINE

## 2021-11-16 RX ORDER — SULFAMETHOXAZOLE/TRIMETHOPRIM 800-160 MG
TABLET ORAL
Qty: 45 TABLET | Refills: 3 | Status: SHIPPED | OUTPATIENT
Start: 2021-11-16 | End: 2022-09-06

## 2021-11-16 RX ORDER — KETOCONAZOLE 20 MG/ML
SHAMPOO TOPICAL
Qty: 120 ML | Refills: 11 | Status: SHIPPED | OUTPATIENT
Start: 2021-11-16 | End: 2023-02-01

## 2021-11-16 ASSESSMENT — PAIN SCALES - GENERAL: PAINLEVEL: NO PAIN (0)

## 2021-11-16 ASSESSMENT — MIFFLIN-ST. JEOR: SCORE: 1267.37

## 2021-11-16 NOTE — NURSING NOTE
Oncology Rooming Note    November 16, 2021 1:45 PM   Tricia Sosa is a 81 year old female who presents for:    Chief Complaint   Patient presents with     Oncology Clinic Visit     Other autoimmune hemolytic anemia     Initial Vitals: /67   Pulse 65   Temp 97.7  F (36.5  C) (Tympanic)   Resp 16   Ht 1.829 m (6')   Wt 69 kg (152 lb 3.2 oz)   LMP  (LMP Unknown)   SpO2 98%   BMI 20.64 kg/m   Estimated body mass index is 20.64 kg/m  as calculated from the following:    Height as of this encounter: 1.829 m (6').    Weight as of this encounter: 69 kg (152 lb 3.2 oz). Body surface area is 1.87 meters squared.  No Pain (0) Comment: Data Unavailable   No LMP recorded (lmp unknown). Patient is postmenopausal.  Allergies reviewed: Yes  Medications reviewed: Yes    Medications: Medication refills not needed today.  Pharmacy name entered into [employer]:    [place], [place] - [address] [facility] [id number] [place], [place] - [address] [facility]    Clinical concerns: f/u       Barbara Corrigan, CMA

## 2021-11-16 NOTE — PROGRESS NOTES
[facility]    Hematology/Oncology Established Patient Follow-up Note      Today's Date: 11/16/21    Reason for Follow-up: Hemolytic anemia-autoimmune; IgA deficiency    HISTORY OF PRESENT ILLNESS: Tricia Sosa is a 81 year old female who presents with autoimmune hemolytic anemia and IgA deficiency.  She previously saw Dr. Matos and then Dr. Summers.  She was previously seen at [facility].    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  She was very sick at the time and had severe diarrhea.  She lost about 40 pounds in weight.  The workup revealed that she had markedly diminished CD4 counts of 48 and was diagnosed with CMV colitis.  Since then she has been followed in hematology clinic at [place] until recently.  She was noted to have anemia which was fairly long-standing.  She was initially referred for anemia in 2004 and also had a bone marrow biopsy done at that time which revealed hypercellular bone marrow with 70-80% cellularity and normal trilineage hematopoiesis and no morphologic features of MDS or lymphoproliferation.  Her anemia was attributed to her chronic underlying disease.  At the next evaluation in 2002 on 4 aggressive anemia there was no evidence of hemolysis, iron deficiency, B12 or folate deficiency.  Bone marrow biopsy was not pursued.  In summer of 2015 she had progressive fatigue, dyspnea on exertion and worsening anemia with a hemoglobin now of 9.  Workup at this time did suggest a hemolytic anemia with elevated LDH at 709, undetectable haptoglobin and elevated unconjugated bilirubin at 3.3.  Monospecific MARIKA was positive for both IgG and complement.  Cold agglutinin screen was positive with very low titer 1:64.  She was started on prednisone 60 mg daily with supplementation of iron folate and B12 starting 7/31/15.  Her prednisone has been slowly tapered and was discontinued off in January 2016.  She was last followed at [facility]  on March 10, 2016 when she had stable hemoglobin.    She was followed by Dr. Matos and Dr. Summers.  Due to relapse of hemolytic anemia, she underwent another course of prednisone that has since been tapered off and rituximab x 4 weekly doses completed in 9/19/19.    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  She started weekly Rituxan on 7/31/20 x 4 doses, completed on 8/21/20.  She tapered off of prednisone in October 2020.    Due to relapse of her AIHA, she started prednisone again on 6/8/21 at 60 mg daily with slow taper and finished taper in August 2021.    Due to relapse of AIHA again, she started prednisone again on 11/2/21 at 70 mg daily with slow taper.      INTERIM HISTORY: Tricia is feeling better since starting steroids again.   Her shortness of breath is resolved.        REVIEW OF SYSTEMS:   14 point ROS was reviewed and is negative other than as noted above in HPI.       HOME MEDICATIONS:  Current Outpatient Medications   Medication Sig Dispense Refill     cyanocobalamin (VITAMIN  B-12) 1000 MCG tablet   3     Flaxseed, Linseed, (FLAX SEEDS PO) Take by mouth daily       folic acid (FOLVITE) 1 MG tablet   3     hydrochlorothiazide (HYDRODIURIL) 12.5 MG tablet Take 1 tablet (12.5 mg) by mouth daily as needed (edema) 30 tablet 5     hydrocortisone butyrate (LOCOID) 0.1 % SOLN solution Apply to scalp bid prn 60 mL 11     ketoconazole (NIZORAL) 2 % external shampoo Use every other day to scalp as needed 120 mL 11     levothyroxine (SYNTHROID/LEVOTHROID) 150 MCG tablet Take 1 tablet (150 mcg) by mouth daily 90 tablet 3     predniSONE (DELTASONE) 10 MG tablet Take 7 tablets (70 mg) by mouth daily 150 tablet 0     sulfamethoxazole-trimethoprim (BACTRIM DS) 800-160 MG tablet Take 1 pill orally on Mon, Wed and Friday 45 tablet 3     sulfamethoxazole-trimethoprim (BACTRIM) 400-80 MG tablet Take 1 tablet by mouth daily 90 tablet 0     triamcinolone  (KENALOG) 0.1 % external cream        UNABLE TO FIND privigen inj every two months           ALLERGIES:  Allergies   Allergen Reactions     Doxycycline          PAST MEDICAL HISTORY:  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, and posterior vitreous detachment     Urinary tract infection, site not specified     Recurrent UTI's         PAST SURGICAL HISTORY:  Past Surgical History:   Procedure Laterality Date     C LIGATE FALLOPIAN TUBE       COLONOSCOPY N/A 4/26/2016    Procedure: COLONOSCOPY;  Surgeon: Dontae Del Rio MD;  Location:  GI     ENT SURGERY  1985    Thyroid lobectomy     EYE SURGERY Bilateral 04/20/2021    Cataract Surgery     HC CYSTOSCOPY,INSERT URETERAL STENT      Ureteral stent insertion     HC FLEX SIGMOIDOSCOPY W BIOPSY  5/30/00      LAPAROSCOPY, SURGICAL; CHOLECYSTECTOMY  1992      THYROIDECTOMY       LIGATION OF HEMORRHOID(S)      Hemorrhoidectomy     UPPER GI ENDOSCOPY,BIOPSY  10/01/01     ZZ COLONOSCOPY W BIOPSY  4/26/00     ZZHC COLONOSCOPY W BIOPSY  10/8/03     ZZHC COLONOSCOPY W BIOPSY  6/27/05    REPEAT IN 5 YEARS         SOCIAL HISTORY:  Social History     Socioeconomic History     Marital status:      Spouse name: Not on file     Number of children: Not on file     Years of education: Not on file     Highest education level: Not on file   Occupational History     Not on file   Tobacco Use     Smoking status: Never  Smoker     Smokeless tobacco: Never Used   Substance and Sexual Activity     Alcohol use: Yes     Alcohol/week: 0.0 standard drinks     Comment: 1 a day at most     Drug use: No     Sexual activity: Not Currently     Partners: Male   Other Topics Concern     Parent/sibling w/ CABG, MI or angioplasty before 65F 55M? No   Social History Narrative     Not on file     Social Determinants of Health     Financial Resource Strain: Not on file   Food Insecurity: Not on file   Transportation Needs: Not on file   Physical Activity: Not on file   Stress: Not on file   Social Connections: Not on file   Intimate Partner Violence: Not At Risk     Fear of Current or Ex-Partner: No     Emotionally Abused: No     Physically Abused: No     Sexually Abused: No   Housing Stability: Not on file         FAMILY HISTORY:  Family History   Problem Relation Age of Onset     Colon Cancer Mother 90     Cerebrovascular Disease Father          at age 85     Dementia Brother      Prostate Cancer Brother      Thyroid Disease Brother      Dementia Brother      Thyroid Disease Brother      Pancreatic Cancer Brother      Breast Cancer Sister      Hyperlipidemia Sister      Depression Sister      Anxiety Disorder Sister      Thyroid Disease Sister      Breast Cancer Sister      Colon Cancer Niece      Other Cancer Niece         leukemia         PHYSICAL EXAM:  /67   Pulse 65   Temp 97.7  F (36.5  C) (Tympanic)   Resp 16   Ht 1.829 m (6')   Wt 69 kg (152 lb 3.2 oz)   LMP  (LMP Unknown)   SpO2 98%   BMI 20.64 kg/m    ECO  GENERAL/CONSTITUTIONAL: No acute distress.  EYES: No scleral icterus.  NEUROLOGIC: Alert, oriented, answers questions appropriately.  INTEGUMENTARY: No jaundice.        LABS:  CBC RESULTS: Recent Labs   Lab Test 21  1338   WBC 7.7   RBC 3.21*   HGB 9.7*   HCT 30.8*   MCV 96   MCH 30.2   MCHC 31.5   RDW 14.7            ASSESSMENT/PLAN:  Tricia oSsa is a 81 year old female with:      1. Warm  autoimmune hemolytic anemia (positive MARIKA to IgG and complement)  2. 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 Common variable immunodeficiency disorder  4. Splenomegaly        1) Autoimmune hemolytic anemia: She had relapse in July 2020, and started on prednisone, as well as weekly rituximab infusions x 4, completed on 8/21/20.  Her hemoglobin slowly improved back up to her baseline and has been stable.  She tapered off of prednisone as of early October 2020.  She had another relapse, and stated on prednisone again on 6/8/21.  She has had good response in hemoglobin back to her baseline with prednisone alone, and has finished taper in August 2021.    We also previously talked about other treatment options if her disease becomes refractory to steroids, such as splenectomy.      Her hemoglobin dropped again, and she restarted prednisone at 70 mg daily on 11/2/21.   Her hemoglobin has improved back up to 9.7 today.      She does not want to consider splenectomy yet.  She does still seem to be responding to steroids.  However, she relapsed again more quickly this time.  She is willing to take rituximab again to see if she can go longer between recurrent episodes.       -she will take prednisone 70 mg daily for another week, and if hemoglobin remains stable, can decrease by 10 mg once a week until at 20 mg daily, and then decrease by 5 mg every other week until off.  -schedule rituximab weekly x 4 doses  -see FELIZ with 2nd dose of rituximab to make sure she is tolerating  -monitor CBC weekly for now while on steroids.    -she is on Bactrim for prophylaxis when she is on prednisone  -RTC with me with her next IVIG infusion; due to transportation issues, she would like to minimize number of visits if possible    2) CVID:   -continue IVIG.  She gets it, if IgG is <600.  It's been about every other month.  She will get a dose scheduled 11/23/21.  -IgG check and IVIG every 8 weeks if meets  parameters  -she says that she had a infectious disease physician when she was at [facility] and would like a referral here.    -she did see immunology, but had to go to [place], and does not want to go there      Sabina Boyd MD  Hematology/Oncology  [facility]        Total time spent on day of visit, including review of tests, obtaining/reviewing separately obtained history, ordering medications/tests/procedures, communicating with PCP/consultants, and documenting in electronic medical record: 30 minutes

## 2021-11-16 NOTE — PROGRESS NOTES
Medical Assistant Note:  Tricia Sosa presents today for blood draw.    Patient seen by provider today: Yes: .   present during visit today: Not Applicable.    Concerns: No Concerns.    Procedure:  Lab draw site: right antecub, Needle type: butterfly, Gauge: 23.    Post Assessment:  Labs drawn without difficulty: Yes.    Discharge Plan:  Departure Mode: Ambulatory.    Face to Face Time: 10.    Barbara Corrigan, CMA

## 2021-11-16 NOTE — LETTER
11/16/2021         RE: rTicia Sosa  [address]  [place] [address]        Dear Colleague,    Thank you for referring your patient, Tricia Sosa, to the [facility]. Please see a copy of my visit note below.    [facility]    Hematology/Oncology Established Patient Follow-up Note      Today's Date: 11/16/21    Reason for Follow-up: Hemolytic anemia-autoimmune; IgA deficiency    HISTORY OF PRESENT ILLNESS: Tricia Sosa is a 81 year old female who presents with autoimmune hemolytic anemia and IgA deficiency.  She previously saw Dr. Matos and then Dr. Summers.  She was previously seen at [facility].    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  She was very sick at the time and had severe diarrhea.  She lost about 40 pounds in weight.  The workup revealed that she had markedly diminished CD4 counts of 48 and was diagnosed with CMV colitis.  Since then she has been followed in hematology clinic at [place] until recently.  She was noted to have anemia which was fairly long-standing.  She was initially referred for anemia in 2004 and also had a bone marrow biopsy done at that time which revealed hypercellular bone marrow with 70-80% cellularity and normal trilineage hematopoiesis and no morphologic features of MDS or lymphoproliferation.  Her anemia was attributed to her chronic underlying disease.  At the next evaluation in 2002 on 4 aggressive anemia there was no evidence of hemolysis, iron deficiency, B12 or folate deficiency.  Bone marrow biopsy was not pursued.  In summer of 2015 she had progressive fatigue, dyspnea on exertion and worsening anemia with a hemoglobin now of 9.  Workup at this time did suggest a hemolytic anemia with elevated LDH at 709, undetectable haptoglobin and elevated unconjugated bilirubin at 3.3.  Monospecific MARIKA was positive for both IgG and complement.  Cold agglutinin  screen was positive with very low titer 1:64.  She was started on prednisone 60 mg daily with supplementation of iron folate and B12 starting 7/31/15.  Her prednisone has been slowly tapered and was discontinued off in January 2016.  She was last followed at [facility] on March 10, 2016 when she had stable hemoglobin.    She was followed by Dr. Matos and Dr. Summers.  Due to relapse of hemolytic anemia, she underwent another course of prednisone that has since been tapered off and rituximab x 4 weekly doses completed in 9/19/19.    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  She started weekly Rituxan on 7/31/20 x 4 doses, completed on 8/21/20.  She tapered off of prednisone in October 2020.    Due to relapse of her AIHA, she started prednisone again on 6/8/21 at 60 mg daily with slow taper and finished taper in August 2021.    Due to relapse of AIHA again, she started prednisone again on 11/2/21 at 70 mg daily with slow taper.      INTERIM HISTORY: Tricia is feeling better since starting steroids again.   Her shortness of breath is resolved.        REVIEW OF SYSTEMS:   14 point ROS was reviewed and is negative other than as noted above in HPI.       HOME MEDICATIONS:  Current Outpatient Medications   Medication Sig Dispense Refill     cyanocobalamin (VITAMIN  B-12) 1000 MCG tablet   3     Flaxseed, Linseed, (FLAX SEEDS PO) Take by mouth daily       folic acid (FOLVITE) 1 MG tablet   3     hydrochlorothiazide (HYDRODIURIL) 12.5 MG tablet Take 1 tablet (12.5 mg) by mouth daily as needed (edema) 30 tablet 5     hydrocortisone butyrate (LOCOID) 0.1 % SOLN solution Apply to scalp bid prn 60 mL 11     ketoconazole (NIZORAL) 2 % external shampoo Use every other day to scalp as needed 120 mL 11     levothyroxine (SYNTHROID/LEVOTHROID) 150 MCG tablet Take 1 tablet (150 mcg) by mouth daily 90 tablet 3     predniSONE (DELTASONE) 10 MG tablet Take 7 tablets (70 mg) by  mouth daily 150 tablet 0     sulfamethoxazole-trimethoprim (BACTRIM DS) 800-160 MG tablet Take 1 pill orally on Mon, Wed and Friday 45 tablet 3     sulfamethoxazole-trimethoprim (BACTRIM) 400-80 MG tablet Take 1 tablet by mouth daily 90 tablet 0     triamcinolone (KENALOG) 0.1 % external cream        UNABLE TO FIND privigen inj every two months           ALLERGIES:  Allergies   Allergen Reactions     Doxycycline          PAST MEDICAL HISTORY:  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, and posterior vitreous detachment     Urinary tract infection, site not specified     Recurrent UTI's         PAST SURGICAL HISTORY:  Past Surgical History:   Procedure Laterality Date     C LIGATE FALLOPIAN TUBE       COLONOSCOPY N/A 4/26/2016    Procedure: COLONOSCOPY;  Surgeon: Dontae Del Rio MD;  Location:  GI     ENT SURGERY  1985    Thyroid lobectomy     EYE SURGERY Bilateral 04/20/2021    Cataract Surgery      CYSTOSCOPY,INSERT URETERAL STENT      Ureteral stent insertion      FLEX SIGMOIDOSCOPY W BIOPSY  5/30/00      LAPAROSCOPY, SURGICAL; CHOLECYSTECTOMY  1992      THYROIDECTOMY       LIGATION OF HEMORRHOID(S)      Hemorrhoidectomy     UPPER GI ENDOSCOPY,BIOPSY  10/01/01     [facility] COLONOSCOPY W BIOPSY  4/26/00     [facility] COLONOSCOPY W BIOPSY  10/8/03     [facility] COLONOSCOPY W BIOPSY  6/27/05    REPEAT IN 5 YEARS         SOCIAL HISTORY:  Social History      Socioeconomic History     Marital status:      Spouse name: Not on file     Number of children: Not on file     Years of education: Not on file     Highest education level: Not on file   Occupational History     Not on file   Tobacco Use     Smoking status: Never Smoker     Smokeless tobacco: Never Used   Substance and Sexual Activity     Alcohol use: Yes     Alcohol/week: 0.0 standard drinks     Comment: 1 a day at most     Drug use: No     Sexual activity: Not Currently     Partners: Male   Other Topics Concern     Parent/sibling w/ CABG, MI or angioplasty before 65F 55M? No   Social History Narrative     Not on file     Social Determinants of Health     Financial Resource Strain: Not on file   Food Insecurity: Not on file   Transportation Needs: Not on file   Physical Activity: Not on file   Stress: Not on file   Social Connections: Not on file   Intimate Partner Violence: Not At Risk     Fear of Current or Ex-Partner: No     Emotionally Abused: No     Physically Abused: No     Sexually Abused: No   Housing Stability: Not on file         FAMILY HISTORY:  Family History   Problem Relation Age of Onset     Colon Cancer Mother 90     Cerebrovascular Disease Father          at age 85     Dementia Brother      Prostate Cancer Brother      Thyroid Disease Brother      Dementia Brother      Thyroid Disease Brother      Pancreatic Cancer Brother      Breast Cancer Sister      Hyperlipidemia Sister      Depression Sister      Anxiety Disorder Sister      Thyroid Disease Sister      Breast Cancer Sister      Colon Cancer Niece      Other Cancer Niece         leukemia         PHYSICAL EXAM:  /67   Pulse 65   Temp 97.7  F (36.5  C) (Tympanic)   Resp 16   Ht 1.829 m (6')   Wt 69 kg (152 lb 3.2 oz)   LMP  (LMP Unknown)   SpO2 98%   BMI 20.64 kg/m    ECO  GENERAL/CONSTITUTIONAL: No acute distress.  EYES: No scleral icterus.  NEUROLOGIC: Alert, oriented, answers questions  appropriately.  INTEGUMENTARY: No jaundice.        LABS:  CBC RESULTS: Recent Labs   Lab Test 11/16/21  1338   WBC 7.7   RBC 3.21*   HGB 9.7*   HCT 30.8*   MCV 96   MCH 30.2   MCHC 31.5   RDW 14.7            ASSESSMENT/PLAN:  Tricia Sosa is a 81 year old female with:      1. 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 Common variable immunodeficiency disorder  4. Splenomegaly        1) Autoimmune hemolytic anemia: She had relapse in July 2020, and started on prednisone, as well as weekly rituximab infusions x 4, completed on 8/21/20.  Her hemoglobin slowly improved back up to her baseline and has been stable.  She tapered off of prednisone as of early October 2020.  She had another relapse, and stated on prednisone again on 6/8/21.  She has had good response in hemoglobin back to her baseline with prednisone alone, and has finished taper in August 2021.    We also previously talked about other treatment options if her disease becomes refractory to steroids, such as splenectomy.      Her hemoglobin dropped again, and she restarted prednisone at 70 mg daily on 11/2/21.   Her hemoglobin has improved back up to 9.7 today.      She does not want to consider splenectomy yet.  She does still seem to be responding to steroids.  However, she relapsed again more quickly this time.  She is willing to take rituximab again to see if she can go longer between recurrent episodes.       -she will take prednisone 70 mg daily for another week, and if hemoglobin remains stable, can decrease by 10 mg once a week until at 20 mg daily, and then decrease by 5 mg every other week until off.  -schedule rituximab weekly x 4 doses  -see FELIZ with 2nd dose of rituximab to make sure she is tolerating  -monitor CBC weekly for now while on steroids.    -she is on Bactrim for prophylaxis when she is on prednisone  -RTC with me with her next  IVIG infusion; due to transportation issues, she would like to minimize number of visits if possible    2) CVID:   -continue IVIG.  She gets it, if IgG is <600.  It's been about every other month.  She will get a dose scheduled 11/23/21.  -IgG check and IVIG every 8 weeks if meets parameters  -she says that she had a infectious disease physician when she was at [facility] and would like a referral here.    -she did see immunology, but had to go to [place], and does not want to go there      Sabina Boyd MD  Hematology/Oncology  [facility]        Total time spent on day of visit, including review of tests, obtaining/reviewing separately obtained history, ordering medications/tests/procedures, communicating with PCP/consultants, and documenting in electronic medical record: 30 minutes        Again, thank you for allowing me to participate in the care of your patient.        Sincerely,        Sabina Boyd MD

## 2021-11-16 NOTE — PROGRESS NOTES
Tricia is a 81 year old who is being evaluated via a billable telephone visit.      What phone number would you like to be contacted at? [phone]  How would you like to obtain your AVS? 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  It is probably not due to a medication.  It appears that this had been evaluated in the past and I expect if the EMG had been abnormal the neurologist would have followed up with her.  Suggested possibility of referral back to physical therapy but she would like to just go back on her home exercises.  Advised we could refer her back to neurology if she thinks it is progressive    Dermatitis  Refilled medication.  - ketoconazole (NIZORAL) 2 % external shampoo; Use every other day to scalp as needed        Return in about 7 months (around 6/23/2022) for Wellness visit.    Emily Church MD  [facility]   Tricia is a 81 year old who presents for the following health issues     HPI       She presents with concerns about generalized weakness: She has had some issues for several years, has a difficult time getting up from a chair without using her arms, has a difficult time getting up stairs and does have a stair lift.  It has been hard to lift things overhead.  It does seem that it may be related to her prednisone taken for hemolytic anemia.  She feels some symptoms are worse when she is on higher doses, gets better as the dose decrease.  Right now she feels a little better than she did last week.  She has been walking hunched forward also like her upper back is not strong.   She is not having any pain in her knees, rarely has a groin pain with certain movements.  She has no numbness or tingling in her arms or legs.  She does have some history of right low back pain with sciatica  but not really active right now.  She reports she did see a neurologist within the last few years, had an EMG done.  Orthopedics has tried to get those records a few years ago but we never received them.  At that time they thought the weakness was multifactorial, she did some physical therapy which helped.    She would like a refill of ketoconazole.    Patient Active Problem List   Diagnosis     Lymphocytic colitis     Acquired hypothyroidism     CVID (common variable immunodeficiency) (H)     B12 deficiency     CARDIOVASCULAR SCREENING; LDL GOAL LESS THAN 130     Other autoimmune hemolytic anemia (H)     Right-sided low back pain with right-sided sciatica, unspecified chronicity     Autoimmune hemolytic anemia (H)     Ulcerative colitis (H)     Bronchiectasis (H)     Mild obstructive sleep apnea     Current Outpatient Medications   Medication Sig Dispense Refill     cyanocobalamin (VITAMIN  B-12) 1000 MCG tablet   3     Flaxseed, Linseed, (FLAX SEEDS PO) Take by mouth daily       folic acid (FOLVITE) 1 MG tablet   3     hydrochlorothiazide (HYDRODIURIL) 12.5 MG tablet Take 1 tablet (12.5 mg) by mouth daily as needed (edema) 30 tablet 5     hydrocortisone butyrate (LOCOID) 0.1 % SOLN solution Apply to scalp bid prn 60 mL 11     ketoconazole (NIZORAL) 2 % external shampoo Use every other day to scalp as needed 120 mL 11     levothyroxine (SYNTHROID/LEVOTHROID) 150 MCG tablet Take 1 tablet (150 mcg) by mouth daily 90 tablet 3     sulfamethoxazole-trimethoprim (BACTRIM DS) 800-160 MG tablet Take 1 pill orally on Mon, Wed and Friday 45 tablet 3     sulfamethoxazole-trimethoprim (BACTRIM) 400-80 MG tablet Take 1 tablet by mouth daily 90 tablet 0     triamcinolone (KENALOG) 0.1 % external cream        UNABLE TO FIND privigen inj every two months       predniSONE (DELTASONE) 10 MG tablet Take 7 tablets (70 mg) by mouth daily 150 tablet 0      Social History     Tobacco Use     Smoking status: Never Smoker     Smokeless  tobacco: Never Used   Substance Use Topics     Alcohol use: Yes     Alcohol/week: 0.0 standard drinks     Comment: 1 a day at most     Drug use: No        Review of Systems   No fever, chills, muscle pains, numbness, tingling      Objective       Physical Exam     PSYCH: Alert and oriented times 3; coherent speech, normal   rate and volume, able to articulate logical thoughts, able   to abstract reason, no tangential thoughts, no hallucinations   or delusions  Her affect is normal  RESP: No cough, no audible wheezing, able to talk in full sentences  Remainder of exam unable to be completed due to telephone visits              Phone call duration: 28 minutes

## 2021-11-17 LAB
HAPTOGLOB SERPL-MCNC: <3 MG/DL (ref 32–197)
IGA SERPL-MCNC: <2 MG/DL (ref 84–499)
IGG SERPL-MCNC: 519 MG/DL (ref 610–1616)
IGM SERPL-MCNC: 13 MG/DL (ref 35–242)

## 2021-11-19 DIAGNOSIS — D59.19 OTHER AUTOIMMUNE HEMOLYTIC ANEMIA (H): ICD-10-CM

## 2021-11-19 RX ORDER — PREDNISONE 10 MG/1
70 TABLET ORAL DAILY
Qty: 150 TABLET | Refills: 0 | Status: SHIPPED | OUTPATIENT
Start: 2021-11-19 | End: 2022-01-03

## 2021-11-19 NOTE — TELEPHONE ENCOUNTER
Pending Prescriptions:                       Disp   Refills    predniSONE (DELTASONE) 10 MG tablet       150 ta*0            Sig: Take 7 tablets (70 mg) by mouth daily          Last Written Prescription Date:  11/2/2021  Last Fill Quantity: 150,   # refills: 0  Last Office Visit: 11/16/2021  Future Office visit:    Next 5 appointments (look out 90 days)    Dec 15, 2021  9:00 AM  Return Visit with Trinidad Oden PA-C  [facility] ([facility] ) [address]  [facility] [address]  [phone]   Jan 18, 2022  9:15 AM  Return Visit with Sabina Boyd MD  [facility] ([facility] ) [address]  [facility] [address]  [phone]           Routing refill request to provider for review/approval     Anabell Hawkins RN, BSN, COLLEEN  RN Care Coordinator  [facility]  [phone]

## 2021-11-19 NOTE — TELEPHONE ENCOUNTER
Signed Prescriptions:                        Disp   Refills    predniSONE (DELTASONE) 10 MG tablet        150 ta*0        Sig: Take 7 tablets (70 mg) by mouth daily  Authorizing Provider: LEON DLALAS, RN, BSN, COLLEEN  RN Care Coordinator  [facility]  [phone]

## 2021-11-20 ENCOUNTER — ANCILLARY PROCEDURE (OUTPATIENT)
Dept: GENERAL RADIOLOGY | Facility: CLINIC | Age: 81
End: 2021-11-20
Attending: FAMILY MEDICINE
Payer: MEDICARE

## 2021-11-20 ENCOUNTER — OFFICE VISIT (OUTPATIENT)
Dept: URGENT CARE | Facility: URGENT CARE | Age: 81
End: 2021-11-20
Payer: MEDICARE

## 2021-11-20 VITALS
SYSTOLIC BLOOD PRESSURE: 140 MMHG | DIASTOLIC BLOOD PRESSURE: 60 MMHG | HEART RATE: 64 BPM | OXYGEN SATURATION: 100 % | TEMPERATURE: 98 F

## 2021-11-20 DIAGNOSIS — M25.421 ELBOW SWELLING, RIGHT: ICD-10-CM

## 2021-11-20 DIAGNOSIS — M25.421 ELBOW SWELLING, RIGHT: Primary | ICD-10-CM

## 2021-11-20 PROCEDURE — 73080 X-RAY EXAM OF ELBOW: CPT | Mod: RT | Performed by: RADIOLOGY

## 2021-11-20 PROCEDURE — 99213 OFFICE O/P EST LOW 20 MIN: CPT | Performed by: FAMILY MEDICINE

## 2021-11-20 NOTE — PATIENT INSTRUCTIONS
Keep the right elbow wrapped with the ACE bandages until you're seen by the orthopedic specialist.      Place ice over the ACE bandages for 20 minutes at a time, every 2-3 hours while awake.      If experiencing a lot of pain, take Tylenol    follow up with an orthopedic specialist within the next week for further evaluation and treatmnt.    Patient Education     Bursitis of the Elbow (Olecranon)   Your elbow contains a small fluid-filled sac called a bursa. The bursa helps the muscles and tendons move smoothly over the bone. It also cushions and protects your elbow. Bursitis is when the bursa is inflamed or swollen. This is most often due to overuse of, or injury to, the elbow. Symptoms include swelling and pain. If the elbow is red and feels warm to the touch, the bursa itself may be infected.  In most cases, elbow bursitis goes away with medicine and self-care at home. It may take several weeks for the bursa to heal and the swelling to go away. In some cases, your healthcare provider may drain extra fluid from the bursa. Or they may inject medicine directly into the bursa to help relieve symptoms. In severe cases, you may need surgery to remove the bursa. If there is concern that the bursa is infected, your healthcare provider may prescribe antibiotics to treat the infection.    Home care  Your healthcare provider may prescribe medicine to help relieve pain and swelling. This may be an over-the-counter pain reliever or prescription pain medicine. Take all medicines as directed. To help treat or prevent infection, your healthcare provider may prescribe antibiotics. If these are prescribed, take them as directed until they are gone.  The following are general care guidelines:    Apply an ice pack or bag of frozen peas wrapped in a thin towel to your elbow for 15 to 20 minutes at a time. Do this 3 to 4 times a day until pain and swelling improve.    Keep your elbow raised above the level of your heart whenever  possible. This helps reduce swelling. When sitting or lying down, place your arm on a pillow that rests on your chest or on a pillow at your side.    Use an elastic wrap around the elbow joint to compress the area while it is healing. Make the wrap snug but not tight to the point of causing pain.    Rest your elbow to give it time to heal. You may need to wear an elbow pad to help protect and limit the movement of your elbow. During and after healing, avoid leaning on your elbows.  Follow-up care  Follow up with your healthcare provider, or as advised. If you have been referred to a specialist, make that appointment promptly.  When to seek medical advice  Call your healthcare provider right away if any of these occur:    Fever of 100.4 F (38 C) or higher, or as advised by your healthcare provider    Chills    Increased pain, swelling, warmth, redness, or drainage from the joint    Trouble moving the elbow joint    Numbness or tingling in the hand    Severe pain or swelling in forearm or hand    Loss of pink color and slow return of color after squeezing fingertip or hand  Sana last reviewed this educational content on 9/1/2019 2000-2021 [employer]. All rights reserved. This information is not intended as a substitute for professional medical care. Always follow your healthcare professional's instructions.

## 2021-11-20 NOTE — PROGRESS NOTES
SUBJECTIVE:   Tricia Sosa is a 81 year old right-handed female presenting with a chief complaint of a large painless lump on the tip of the right elbow noticed this morning.    .  A garbage can lid hit the patient's right upper arm yesterday afternoon.  No obvious falls onto the right elbow.  Patient denies any recent activities that caused the right elbow to bang against hard surfaces.      Course of illness is still present.  She can still flex and extend the right elbow without difficulty  .      Treatment measures tried include none.  ..    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, and posterior vitreous detachment     Urinary tract infection, site not specified     Recurrent UTI's     Current Outpatient Medications   Medication Sig Dispense Refill     cyanocobalamin (VITAMIN  B-12) 1000 MCG tablet   3     Flaxseed, Linseed, (FLAX SEEDS PO) Take by mouth daily       folic acid (FOLVITE) 1 MG tablet   3     hydrochlorothiazide (HYDRODIURIL) 12.5 MG tablet Take 1 tablet (12.5 mg) by mouth daily as needed (edema) 30 tablet 5     hydrocortisone butyrate (LOCOID) 0.1 % SOLN solution Apply to scalp bid prn 60 mL 11     ketoconazole (NIZORAL) 2 % external shampoo Use every other day to scalp as needed 120 mL 11     levothyroxine (SYNTHROID/LEVOTHROID) 150 MCG tablet Take 1 tablet (150 mcg) by mouth daily 90 tablet 3     predniSONE (DELTASONE) 10 MG tablet  Take 7 tablets (70 mg) by mouth daily 150 tablet 0     sulfamethoxazole-trimethoprim (BACTRIM DS) 800-160 MG tablet Take 1 pill orally on Mon, Wed and Friday 45 tablet 3     sulfamethoxazole-trimethoprim (BACTRIM) 400-80 MG tablet Take 1 tablet by mouth daily 90 tablet 0     triamcinolone (KENALOG) 0.1 % external cream        UNABLE TO FIND privigen inj every two months       Social History     Tobacco Use     Smoking status: Never Smoker     Smokeless tobacco: Never Used   Substance Use Topics     Alcohol use: Yes     Alcohol/week: 0.0 standard drinks     Comment: 1 a day at most       ROS:  CONSTITUTIONAL:NEGATIVE  for fevers.   INTEGUMENTARY/SKIN:  Positive for a bruise at the right upper arm.    MUSCULOSKELETAL: positive for a lump overlying the tip of the right elbow.    NEURO: negative for numbness/weakness.      OBJECTIVE:  BP (!) 140/60   Pulse 64   Temp 98  F (36.7  C) (Tympanic)   LMP  (LMP Unknown)   SpO2 100%   GENERAL APPEARANCE: healthy, alert and no distress  Extremities: Right elbow has a large soft cyst-like lump overlying the olecranon.  Normal ROM at the right elbow.  No tenderness with palpation over the right elbow.  There is a small area of ecchymosis at the midshaft area of the posterior right upper arm.    NEURO: Normal strength and tone, sensory exam grossly normal,  normal speech and mentation.  Tests of function of the radial, median and ulnar nerves are within normal limits at the right arm and right hand.      X-rays of the right elbow:  I viewed all X-ray images during this clinic encounter.  The X-rays showed no fractures/avulsions/dislocations.      ASSESSMENT:  Swelling at the right elbow.  The X-rays of the right elbow did not show fractures/avulsions/dislocations.    Differential diagnosis includes Right Olecranon Bursitis vs Right olecranon hematoma    PLAN:  Right Elbow was wrapped with ACE bandages  Keep the right elbow wrapped with the ACE bandages until you're seen by the  orthopedic specialist.      Place ice over the ACE bandages for 20 minutes at a time, every 2-3 hours while awake.      If experiencing a lot of pain, take Tylenol    follow up with an orthopedic specialist within the next week for further evaluation and treatment.      Claude Guillen MD

## 2021-11-22 ENCOUNTER — LAB (OUTPATIENT)
Dept: ONCOLOGY | Facility: CLINIC | Age: 81
End: 2021-11-22
Attending: INTERNAL MEDICINE
Payer: MEDICARE

## 2021-11-22 ENCOUNTER — TELEPHONE (OUTPATIENT)
Dept: INTERNAL MEDICINE | Facility: CLINIC | Age: 81
End: 2021-11-22

## 2021-11-22 DIAGNOSIS — D59.19 OTHER AUTOIMMUNE HEMOLYTIC ANEMIA (H): ICD-10-CM

## 2021-11-22 LAB
BASOPHILS # BLD AUTO: 0 10E3/UL (ref 0–0.2)
BASOPHILS NFR BLD AUTO: 0 %
EOSINOPHIL # BLD AUTO: 0 10E3/UL (ref 0–0.7)
EOSINOPHIL NFR BLD AUTO: 0 %
ERYTHROCYTE [DISTWIDTH] IN BLOOD BY AUTOMATED COUNT: 14 % (ref 10–15)
HCT VFR BLD AUTO: 31.2 % (ref 35–47)
HGB BLD-MCNC: 9.5 G/DL (ref 11.7–15.7)
IMM GRANULOCYTES # BLD: 0 10E3/UL
IMM GRANULOCYTES NFR BLD: 0 %
LYMPHOCYTES # BLD AUTO: 1.8 10E3/UL (ref 0.8–5.3)
LYMPHOCYTES NFR BLD AUTO: 23 %
MCH RBC QN AUTO: 29.1 PG (ref 26.5–33)
MCHC RBC AUTO-ENTMCNC: 30.4 G/DL (ref 31.5–36.5)
MCV RBC AUTO: 95 FL (ref 78–100)
MONOCYTES # BLD AUTO: 0.9 10E3/UL (ref 0–1.3)
MONOCYTES NFR BLD AUTO: 11 %
NEUTROPHILS # BLD AUTO: 5 10E3/UL (ref 1.6–8.3)
NEUTROPHILS NFR BLD AUTO: 66 %
NRBC # BLD AUTO: 0 10E3/UL
NRBC BLD AUTO-RTO: 0 /100
PLATELET # BLD AUTO: 160 10E3/UL (ref 150–450)
RBC # BLD AUTO: 3.27 10E6/UL (ref 3.8–5.2)
WBC # BLD AUTO: 7.7 10E3/UL (ref 4–11)

## 2021-11-22 PROCEDURE — 36415 COLL VENOUS BLD VENIPUNCTURE: CPT

## 2021-11-22 PROCEDURE — 85025 COMPLETE CBC W/AUTO DIFF WBC: CPT | Performed by: INTERNAL MEDICINE

## 2021-11-22 NOTE — TELEPHONE ENCOUNTER
Patient calls, she was seen in Urgent Care on Saturday after developing sudden lump on her right elbow the size of a golf ball. They did x-rays and referred her to Orthopedics. She has been wrapping her elbow with ACE bandage and today the lump is almost gone, only about about 1/2 inch in size. She asks if it's necessary to see Orthopedics or if she can continue to monitor at this time.     Urgent Care notes are in Epic. Patient is not having any pain or limited movements with this lump.     Montserrat Dowell RN  [facility]

## 2021-11-22 NOTE — TELEPHONE ENCOUNTER
This was most likely a swollen bursa.  There are very common at the elbow.  She does not need to see orthopedics.  The important thing is to avoid leaning on that forearm or elbow, also be careful of doing any repetitive activities like scrubbing.

## 2021-11-23 ENCOUNTER — INFUSION THERAPY VISIT (OUTPATIENT)
Dept: INFUSION THERAPY | Facility: CLINIC | Age: 81
End: 2021-11-23
Attending: INTERNAL MEDICINE
Payer: MEDICARE

## 2021-11-23 VITALS
BODY MASS INDEX: 20.61 KG/M2 | OXYGEN SATURATION: 100 % | DIASTOLIC BLOOD PRESSURE: 65 MMHG | RESPIRATION RATE: 16 BRPM | HEART RATE: 69 BPM | SYSTOLIC BLOOD PRESSURE: 138 MMHG | WEIGHT: 152 LBS | TEMPERATURE: 98 F

## 2021-11-23 DIAGNOSIS — D83.9 CVID (COMMON VARIABLE IMMUNODEFICIENCY) (H): Primary | ICD-10-CM

## 2021-11-23 DIAGNOSIS — D59.10 AUTOIMMUNE HEMOLYTIC ANEMIA (H): ICD-10-CM

## 2021-11-23 PROCEDURE — 96375 TX/PRO/DX INJ NEW DRUG ADDON: CPT

## 2021-11-23 PROCEDURE — 250N000011 HC RX IP 250 OP 636: Performed by: INTERNAL MEDICINE

## 2021-11-23 PROCEDURE — 96365 THER/PROPH/DIAG IV INF INIT: CPT

## 2021-11-23 PROCEDURE — 96366 THER/PROPH/DIAG IV INF ADDON: CPT

## 2021-11-23 RX ORDER — DIPHENHYDRAMINE HCL 25 MG
50 CAPSULE ORAL ONCE
Status: CANCELLED | OUTPATIENT
Start: 2021-11-23

## 2021-11-23 RX ORDER — HEPARIN SODIUM,PORCINE 10 UNIT/ML
5 VIAL (ML) INTRAVENOUS
Status: CANCELLED | OUTPATIENT
Start: 2021-11-23

## 2021-11-23 RX ORDER — HEPARIN SODIUM (PORCINE) LOCK FLUSH IV SOLN 100 UNIT/ML 100 UNIT/ML
5 SOLUTION INTRAVENOUS
Status: CANCELLED | OUTPATIENT
Start: 2021-11-23

## 2021-11-23 RX ORDER — ACETAMINOPHEN 325 MG/1
650 TABLET ORAL ONCE
Status: CANCELLED
Start: 2021-11-23

## 2021-11-23 RX ADMIN — HYDROCORTISONE SODIUM SUCCINATE 100 MG: 100 INJECTION, POWDER, FOR SOLUTION INTRAMUSCULAR; INTRAVENOUS at 10:12

## 2021-11-23 RX ADMIN — HUMAN IMMUNOGLOBULIN G 35 G: 20 LIQUID INTRAVENOUS at 10:14

## 2021-11-23 NOTE — PROGRESS NOTES
Infusion Nursing Note:  Maandrew JUNIOR GoncalvesIan presents today for IVIG.    Patient seen by provider today: No   present during visit today: Not Applicable.    Note: N/A.      Intravenous Access:  Peripheral IV placed.    Treatment Conditions:  Biological Infusion Checklist:  ~~~ NOTE: If the patient answers yes to any of the questions below, hold the infusion and contact ordering provider or on-call provider.    1. Have you recently had an elevated temperature, fever, chills, productive cough, coughing for 3 weeks or longer or hemoptysis, abnormal vital signs, night sweats,  chest pain or have you noticed a decrease in your appetite, unexplained weight loss or fatigue? No  2. Do you have any open wounds or new incisions? No  3. Do you have any recent or upcoming hospitalizations, surgeries or dental procedures? No  4. Do you currently have or recently have had any signs of illness or infection or are you on any antibiotics? No  5. Have you had any new, sudden or worsening abdominal pain? No  6. Have you or anyone in your household received a live vaccination in the past 4 weeks? Please note:  No live vaccines while on biologic/chemotherapy until 6 months after the last treatment.  Patient can receive the flu vaccine (shot only) and the pneumovax.  It is optimal for the patient to get these vaccines mid cycle, but they can be given at any time as long as it is not on the day of the infusion. No  7. Have you recently been diagnosed with any new nervous system diseases (ie. Multiple sclerosis, Guillain Trona, seizures, neurological changes) or cancer diagnosis? No  8. Are you on any form of radiation or chemotherapy? No  9. Are you pregnant or breast feeding or do you have plans of pregnancy in the future? No  10. Have you been having any signs of worsening depression or suicidal ideations?  (benlysta only) No  11. Have there been any other new onset medical symptoms? No        Post Infusion Assessment:  Patient  tolerated infusion without incident.  Blood return noted pre and post infusion.  Site patent and intact, free from redness, edema or discomfort.  No evidence of extravasations.  Access discontinued per protocol.       Discharge Plan:   Discharge instructions reviewed with: Patient.  Patient and/or family verbalized understanding of discharge instructions and all questions answered.  Copy of AVS reviewed with patient and/or family.  Patient will return 12/6/21 for next appointment.  Patient discharged in stable condition accompanied by: self.  Departure Mode: Ambulatory.      Tricia Iyer RN

## 2021-11-30 ENCOUNTER — LAB (OUTPATIENT)
Dept: ONCOLOGY | Facility: CLINIC | Age: 81
End: 2021-11-30
Attending: OBSTETRICS & GYNECOLOGY
Payer: MEDICARE

## 2021-11-30 DIAGNOSIS — D59.19 OTHER AUTOIMMUNE HEMOLYTIC ANEMIA (H): ICD-10-CM

## 2021-11-30 LAB
BASOPHILS # BLD AUTO: 0 10E3/UL (ref 0–0.2)
BASOPHILS NFR BLD AUTO: 0 %
EOSINOPHIL # BLD AUTO: 0 10E3/UL (ref 0–0.7)
EOSINOPHIL NFR BLD AUTO: 0 %
ERYTHROCYTE [DISTWIDTH] IN BLOOD BY AUTOMATED COUNT: 14.2 % (ref 10–15)
HCT VFR BLD AUTO: 32.2 % (ref 35–47)
HGB BLD-MCNC: 9.6 G/DL (ref 11.7–15.7)
IMM GRANULOCYTES # BLD: 0 10E3/UL
IMM GRANULOCYTES NFR BLD: 1 %
LYMPHOCYTES # BLD AUTO: 1.9 10E3/UL (ref 0.8–5.3)
LYMPHOCYTES NFR BLD AUTO: 30 %
MCH RBC QN AUTO: 27.8 PG (ref 26.5–33)
MCHC RBC AUTO-ENTMCNC: 29.8 G/DL (ref 31.5–36.5)
MCV RBC AUTO: 93 FL (ref 78–100)
MONOCYTES # BLD AUTO: 0.7 10E3/UL (ref 0–1.3)
MONOCYTES NFR BLD AUTO: 11 %
NEUTROPHILS # BLD AUTO: 3.7 10E3/UL (ref 1.6–8.3)
NEUTROPHILS NFR BLD AUTO: 58 %
NRBC # BLD AUTO: 0 10E3/UL
NRBC BLD AUTO-RTO: 0 /100
PLATELET # BLD AUTO: 142 10E3/UL (ref 150–450)
RBC # BLD AUTO: 3.45 10E6/UL (ref 3.8–5.2)
WBC # BLD AUTO: 6.3 10E3/UL (ref 4–11)

## 2021-11-30 PROCEDURE — 36415 COLL VENOUS BLD VENIPUNCTURE: CPT

## 2021-11-30 PROCEDURE — 85025 COMPLETE CBC W/AUTO DIFF WBC: CPT | Performed by: INTERNAL MEDICINE

## 2021-12-02 DIAGNOSIS — D59.19 OTHER AUTOIMMUNE HEMOLYTIC ANEMIA (H): ICD-10-CM

## 2021-12-02 DIAGNOSIS — D83.9 CVID (COMMON VARIABLE IMMUNODEFICIENCY) (H): Primary | ICD-10-CM

## 2021-12-02 DIAGNOSIS — Z11.59 ENCOUNTER FOR SCREENING FOR OTHER VIRAL DISEASES: ICD-10-CM

## 2021-12-03 ENCOUNTER — LAB (OUTPATIENT)
Dept: ONCOLOGY | Facility: CLINIC | Age: 81
End: 2021-12-03
Attending: INTERNAL MEDICINE
Payer: MEDICARE

## 2021-12-03 DIAGNOSIS — Z11.59 ENCOUNTER FOR SCREENING FOR OTHER VIRAL DISEASES: ICD-10-CM

## 2021-12-03 DIAGNOSIS — D59.19 OTHER AUTOIMMUNE HEMOLYTIC ANEMIA (H): ICD-10-CM

## 2021-12-03 DIAGNOSIS — D83.9 CVID (COMMON VARIABLE IMMUNODEFICIENCY) (H): ICD-10-CM

## 2021-12-03 LAB
BASOPHILS # BLD AUTO: 0 10E3/UL (ref 0–0.2)
BASOPHILS NFR BLD AUTO: 0 %
EOSINOPHIL # BLD AUTO: 0 10E3/UL (ref 0–0.7)
EOSINOPHIL NFR BLD AUTO: 0 %
ERYTHROCYTE [DISTWIDTH] IN BLOOD BY AUTOMATED COUNT: 14.2 % (ref 10–15)
HBV SURFACE AB SERPL IA-ACNC: 292.11 M[IU]/ML
HBV SURFACE AG SERPL QL IA: NONREACTIVE
HCT VFR BLD AUTO: 29.2 % (ref 35–47)
HGB BLD-MCNC: 9 G/DL (ref 11.7–15.7)
IMM GRANULOCYTES # BLD: 0 10E3/UL
IMM GRANULOCYTES NFR BLD: 0 %
LYMPHOCYTES # BLD AUTO: 1.6 10E3/UL (ref 0.8–5.3)
LYMPHOCYTES NFR BLD AUTO: 38 %
MCH RBC QN AUTO: 28.3 PG (ref 26.5–33)
MCHC RBC AUTO-ENTMCNC: 30.8 G/DL (ref 31.5–36.5)
MCV RBC AUTO: 92 FL (ref 78–100)
MONOCYTES # BLD AUTO: 0.5 10E3/UL (ref 0–1.3)
MONOCYTES NFR BLD AUTO: 11 %
NEUTROPHILS # BLD AUTO: 2.1 10E3/UL (ref 1.6–8.3)
NEUTROPHILS NFR BLD AUTO: 51 %
NRBC # BLD AUTO: 0 10E3/UL
NRBC BLD AUTO-RTO: 0 /100
PLATELET # BLD AUTO: 135 10E3/UL (ref 150–450)
RBC # BLD AUTO: 3.18 10E6/UL (ref 3.8–5.2)
WBC # BLD AUTO: 4.2 10E3/UL (ref 4–11)

## 2021-12-03 PROCEDURE — 86706 HEP B SURFACE ANTIBODY: CPT | Performed by: INTERNAL MEDICINE

## 2021-12-03 PROCEDURE — 87340 HEPATITIS B SURFACE AG IA: CPT | Performed by: INTERNAL MEDICINE

## 2021-12-03 PROCEDURE — 36415 COLL VENOUS BLD VENIPUNCTURE: CPT

## 2021-12-03 PROCEDURE — 86704 HEP B CORE ANTIBODY TOTAL: CPT | Performed by: INTERNAL MEDICINE

## 2021-12-03 PROCEDURE — 85025 COMPLETE CBC W/AUTO DIFF WBC: CPT | Performed by: INTERNAL MEDICINE

## 2021-12-03 NOTE — PROGRESS NOTES
Medical Assistant Note:  Tricia Sosa presents today for blood draw.    Patient seen by provider today: No.   present during visit today: Not Applicable.    Concerns: No Concerns.    Procedure:  Labs drawn: .    Post Assessment:  Labs drawn without difficulty: Yes.    Discharge Plan:  Departure Mode: Ambulatory.    Face to Face Time: 10.    Angy Villagran, [facility]

## 2021-12-06 ENCOUNTER — PATIENT OUTREACH (OUTPATIENT)
Dept: ONCOLOGY | Facility: CLINIC | Age: 81
End: 2021-12-06
Payer: MEDICARE

## 2021-12-06 ENCOUNTER — INFUSION THERAPY VISIT (OUTPATIENT)
Dept: INFUSION THERAPY | Facility: CLINIC | Age: 81
End: 2021-12-06
Attending: INTERNAL MEDICINE
Payer: MEDICARE

## 2021-12-06 VITALS
HEART RATE: 76 BPM | SYSTOLIC BLOOD PRESSURE: 125 MMHG | BODY MASS INDEX: 21.09 KG/M2 | WEIGHT: 155.5 LBS | OXYGEN SATURATION: 98 % | TEMPERATURE: 98.3 F | DIASTOLIC BLOOD PRESSURE: 72 MMHG | RESPIRATION RATE: 16 BRPM

## 2021-12-06 DIAGNOSIS — D59.19 OTHER AUTOIMMUNE HEMOLYTIC ANEMIA (H): Primary | ICD-10-CM

## 2021-12-06 DIAGNOSIS — D83.9 CVID (COMMON VARIABLE IMMUNODEFICIENCY) (H): ICD-10-CM

## 2021-12-06 LAB — HBV CORE AB SERPL QL IA: REACTIVE

## 2021-12-06 PROCEDURE — 96366 THER/PROPH/DIAG IV INF ADDON: CPT

## 2021-12-06 PROCEDURE — 250N000011 HC RX IP 250 OP 636: Performed by: INTERNAL MEDICINE

## 2021-12-06 PROCEDURE — 96365 THER/PROPH/DIAG IV INF INIT: CPT

## 2021-12-06 PROCEDURE — 258N000003 HC RX IP 258 OP 636: Performed by: INTERNAL MEDICINE

## 2021-12-06 PROCEDURE — 250N000013 HC RX MED GY IP 250 OP 250 PS 637: Performed by: INTERNAL MEDICINE

## 2021-12-06 RX ORDER — DIPHENHYDRAMINE HCL 25 MG
50 CAPSULE ORAL ONCE
Status: COMPLETED | OUTPATIENT
Start: 2021-12-06 | End: 2021-12-06

## 2021-12-06 RX ADMIN — RITUXIMAB-ABBS 700 MG: 10 INJECTION, SOLUTION INTRAVENOUS at 09:39

## 2021-12-06 RX ADMIN — DIPHENHYDRAMINE HYDROCHLORIDE 50 MG: 25 CAPSULE ORAL at 09:20

## 2021-12-06 NOTE — PROGRESS NOTES
Infusion Nursing Note:  Tricia Sosa presents today for Rituxan.    Patient seen by provider today: No   present during visit today: Not Applicable.    Note: Last had rituxan 8/2020, so unable to do rapid infusion today. Started at 50 mg/hr and increased by 50 mg/hr every 30 minutes to a max of 400 mg/hr.  Patient took Tylenol at home prior to arrival, so only benadryl given in clinic as premed.      Intravenous Access:  Peripheral IV placed.    Treatment Conditions:    Lab Results   Component Value Date    HGB 9.0 (L) 12/03/2021    WBC 4.2 12/03/2021    ANEU 6.8 07/06/2021    ANEUTAUTO 2.1 12/03/2021     (L) 12/03/2021      Results reviewed, labs MET treatment parameters, ok to proceed with treatment.    Biological Infusion Checklist:  ~~~ NOTE: If the patient answers yes to any of the questions below, hold the infusion and contact ordering provider or on-call provider.    1. Have you recently had an elevated temperature, fever, chills, productive cough, coughing for 3 weeks or longer or hemoptysis, abnormal vital signs, night sweats,  chest pain or have you noticed a decrease in your appetite, unexplained weight loss or fatigue? No  2. Do you have any open wounds or new incisions? No  3. Do you have any recent or upcoming hospitalizations, surgeries or dental procedures? No  4. Do you currently have or recently have had any signs of illness or infection or are you on any antibiotics? Nothing new, only prophylactic  5. Have you had any new, sudden or worsening abdominal pain? No  6. Have you or anyone in your household received a live vaccination in the past 4 weeks? Please note:  No live vaccines while on biologic/chemotherapy until 6 months after the last treatment.  Patient can receive the flu vaccine (shot only) and the pneumovax.  It is optimal for the patient to get these vaccines mid cycle, but they can be given at any time as long as it is not on the day of the infusion. No  7. Have you  recently been diagnosed with any new nervous system diseases (ie. Multiple sclerosis, Guillain Bow, seizures, neurological changes) or cancer diagnosis? No  8. Are you on any form of radiation or chemotherapy? No  9. Are you pregnant or breast feeding or do you have plans of pregnancy in the future? No  10. Have you been having any signs of worsening depression or suicidal ideations?  (benlysta only) No  11. Have there been any other new onset medical symptoms? No    Post Infusion Assessment:  Patient tolerated infusion without incident.  Site patent and intact, free from redness, edema or discomfort.  No evidence of extravasations.  Access discontinued per protocol.       Discharge Plan:   AVS to patient via MYCHART.  Patient will return 12/15 for next appointment.   Patient discharged in stable condition accompanied by: self.  Departure Mode: Ambulatory.      Ally Eller RN

## 2021-12-06 NOTE — PROGRESS NOTES
Tricia called in to clinic asking if she can keep her rituxan infusion for today because she had her Hepatitis B test drawn and is wondering if she can proceed with Rituxan.    In chart review:     Results for TRICIA MARRUFO (MRN [id number]) as of 12/6/2021 08:13   Ref. Range 12/3/2021 11:24   Hep B Surface Agn Latest Ref Range: Nonreactive  Nonreactive     Results for TRICIA MARRUFO (MRN [id number]) as of 12/6/2021 08:13   Ref. Range 12/3/2021 11:24   Hepatitis B Surface Antibody Latest Ref Range: <8.00 m[IU]/mL 292.11 (H)     Hep B Core is still in process.     Writer called and spoke with NIYA Montes. He reported Tricia is ok to proceed with Rituxan today.     Tricia verbalized understanding and had no further questions or concerns at this time.     Anabell Hawkins, RN, BSN, COLLEEN  RN Care Coordinator  [facility]  [phone]

## 2021-12-07 ENCOUNTER — PATIENT OUTREACH (OUTPATIENT)
Dept: ONCOLOGY | Facility: CLINIC | Age: 81
End: 2021-12-07
Payer: MEDICARE

## 2021-12-07 NOTE — PROGRESS NOTES
Sabina Boyd MD  You;  Cancer Clinic Rn Pool 8 minutes ago (10:11 AM)     FELICIA    Let's monitor the symptoms for now, since she is back to baseline.     Let's stay on the prednisone 50 mg  for another week before tapering further.    Message text      Writer called Tricia and spoke with her. Tricia verbalized understanding and thanked writer for the call.     Anabell Hawkins, RN, BSN, DIANNEM  RN Care Coordinator  [facility]  [phone]

## 2021-12-07 NOTE — PROGRESS NOTES
Tricia called in to clinic reporting she had some side effects yesterday after her Rituxan infusion. Bimal reports she felt light headed, head felt foggy, and felt wobbly on feet. She usually does feel somewhat unstable when walking so she has to walk with care but yesterday it was more pronounced. She did not feel like she was unable to walk independently. Today she reports she is back to her baseline.     Tricia also reports today is her last day on 50 mg of Prednisone and she noticed her most recent Hgb is down to 9.0. She is wondering if she should continue the wean down to 40 mg tomorrow.     Writer will update Dr. Boyd and call Tricia back.     Anabell Hawkins, RN, BSN, COLLEEN  RN Care Coordinator  [facility]  [phone]

## 2021-12-14 NOTE — PROGRESS NOTES
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 She was previously seen by Dr. Matos, then Dr. Summers and currently under the care of Dr. Boyd. Please see her note on 7/23/2020 for details of her hematology history.    She has been intermittently on prednisone and Rituxan for hemolytic anemia and saw Dr. Boyd on 7/23/2020.  Despite being on prednisone 60 mg daily, her hemoglobin dropped to 6.4 on 7/29, unfortunately.  She was started on weekly Rituxan infusions and completed 4 doses from 7/30-8/21/2020.  Her AIHA relapsed again and she was started on prednisone at 6/8/2021 at 60 mg daily with a slow taper, finished in August 2021.  Due to most recent relapse, she was started on prednisone again on 11/2/2021 at 70 mg daily with slow taper and Rituxan weekly x4 doses.  Rituxan C1 D1 completed on 12/6/2021.    She is here today for close follow-up and Rituxan #2.    Interval History: Tricia is doing ok.  She is found to be back on Rituxan, but understands the importance.  She admits to not drinking very much during the day and has been having some dizziness with position changes.  She has a history of vertigo, however this is different.  She is been having some intermittent stool incontinence over the last few months, but no leg weakness, saddle anesthesia, back pain or urinary incontinence.  No fever, chills.    Review of Systems: See interval hx. Denies fevers, chills, HA, n/t, changes in vision, cough, sore throat, CP, SOB, abdominal pain, N/V, diarrhea, changes in urination, bleeding, bruising, rash.      PMHx and Social Hx reviewed per EPIC.      Medications:  Current Outpatient Medications   Medication Sig Dispense Refill     cyanocobalamin (VITAMIN  B-12) 1000 MCG tablet   3     Flaxseed, Linseed, (FLAX SEEDS PO) Take by mouth daily        folic acid (FOLVITE) 1 MG tablet   3     hydrochlorothiazide (HYDRODIURIL) 12.5 MG tablet Take 1 tablet (12.5 mg) by mouth daily as needed (edema) 30 tablet 5     hydrocortisone butyrate (LOCOID) 0.1 % SOLN solution Apply to scalp bid prn 60 mL 11     ketoconazole (NIZORAL) 2 % external shampoo Use every other day to scalp as needed 120 mL 11     levothyroxine (SYNTHROID/LEVOTHROID) 150 MCG tablet Take 1 tablet (150 mcg) by mouth daily 90 tablet 3     predniSONE (DELTASONE) 10 MG tablet Take 7 tablets (70 mg) by mouth daily 150 tablet 0     sulfamethoxazole-trimethoprim (BACTRIM DS) 800-160 MG tablet Take 1 pill orally on Mon, Wed and Friday 45 tablet 3     sulfamethoxazole-trimethoprim (BACTRIM) 400-80 MG tablet Take 1 tablet by mouth daily 90 tablet 0     triamcinolone (KENALOG) 0.1 % external cream        UNABLE TO FIND privigen inj every two months         Allergies   Allergen Reactions     Doxycycline        EXAM:    BP (!) 140/63   Pulse 78   Temp 97.9  F (36.6  C) (Tympanic)   Resp 16   Ht 1.829 m (6')   Wt 69.9 kg (154 lb 3.2 oz)   LMP  (LMP Unknown)   SpO2 97%   BMI 20.91 kg/m   Telephone visit, therefore vital signs and exam were not performed.    Labs:   Results for JC MARRUFO (MRN [id number]) as of 12/15/2021 15:43   12/15/2021 08:34   Sodium 138   Potassium 3.7   Chloride 106   Carbon Dioxide 25   Urea Nitrogen 15   Creatinine 0.67   GFR Estimate 83   Calcium 8.6   Anion Gap 7   Albumin 3.7   Protein Total 6.4 (L)   Bilirubin Total 0.6   Alkaline Phosphatase 88   ALT 21   AST 19   Lactate Dehydrogenase 215   Glucose 132 (H)   WBC 5.0   Hemoglobin 9.6 (L)   Hematocrit 32.0 (L)   Platelet Count 173   RBC Count 3.60 (L)   MCV 89   MCH 26.7   MCHC 30.0 (L)   RDW 13.9   % Neutrophils 58   % Lymphocytes 31   % Monocytes 11   % Eosinophils 0   % Basophils 0   Absolute Basophils 0.0   Absolute Eosinophils 0.0   Absolute Immature Granulocytes 0.0   Absolute Lymphocytes 1.5   Absolute Monocytes 0.6    % Immature Granulocytes 0   Absolute Neutrophils 2.9   Absolute NRBCs 0.0   NRBCs per 100 WBC 0   % Retic 2.5 (H)   Absolute Retic 0.091       Imaging: n/a    Impression/Plan: Tricia Sosa is a 79 year old female with autoimmune hemolytic anemia and IgA deficiency, alternating treatment on prednisone, IVIG and rituximab    Autoimmune hemolytic anemia: Relapsed in July 2020, started prednisone, then weekly Rituxan x4, completed in August 2020.  She continued her prednisone taper, had another relapse and started prednisone again with good response.  Another relapse in November 2021 and started prednisone with improvement of her hemoglobin.  Splenectomy was discussed, but she was not interested.  Most recently, hemoglobin dropped again to 7.9 and Dr. Boyd recommended prednisone 70 mg daily.  She also recommended weekly Rituxan x4 again.  Hemoglobin 9.6 today and plan for Rituxan #2.  She has been having some intermittent dizzy spells with position changes, see below.  She will also continue prednisone and will start to taper slowly again.  She will complete Rituxan x4 doses and then follow-up with Dr. Boyd in January 2022.  She will call sooner if needed.  --1/18 Dr. Boyd    CVID: Receiving IVIG every other month, last dose 11/23/2021.  Infectious disease referral placed last month, we will check on that again today.    Stool incontinence: New over the last few months.  No leg weakness, back pain, saddle anesthesia or urinary incontinence.  We will monitor.    Urinary hesitancy: This is been ongoing for 20+ years, but worsened during 2 weeks.  Symptoms are improved.      Chart documentation with Dragon Voice recognition Software. Although reviewed after completion, some words and grammatical errors may remain.      Trinidad Liu PA-C  Hematology/Oncology  [facility]

## 2021-12-15 ENCOUNTER — LAB (OUTPATIENT)
Dept: INFUSION THERAPY | Facility: CLINIC | Age: 81
End: 2021-12-15
Attending: PHYSICIAN ASSISTANT
Payer: MEDICARE

## 2021-12-15 ENCOUNTER — INFUSION THERAPY VISIT (OUTPATIENT)
Dept: INFUSION THERAPY | Facility: CLINIC | Age: 81
End: 2021-12-15
Attending: INTERNAL MEDICINE
Payer: MEDICARE

## 2021-12-15 ENCOUNTER — ONCOLOGY VISIT (OUTPATIENT)
Dept: ONCOLOGY | Facility: CLINIC | Age: 81
End: 2021-12-15
Attending: PHYSICIAN ASSISTANT
Payer: MEDICARE

## 2021-12-15 VITALS
HEART RATE: 78 BPM | DIASTOLIC BLOOD PRESSURE: 63 MMHG | HEIGHT: 72 IN | SYSTOLIC BLOOD PRESSURE: 140 MMHG | RESPIRATION RATE: 16 BRPM | BODY MASS INDEX: 20.88 KG/M2 | OXYGEN SATURATION: 97 % | WEIGHT: 154.2 LBS | TEMPERATURE: 97.9 F

## 2021-12-15 DIAGNOSIS — D83.9 CVID (COMMON VARIABLE IMMUNODEFICIENCY) (H): ICD-10-CM

## 2021-12-15 DIAGNOSIS — D59.19 OTHER AUTOIMMUNE HEMOLYTIC ANEMIA (H): Primary | ICD-10-CM

## 2021-12-15 DIAGNOSIS — D59.10 AUTOIMMUNE HEMOLYTIC ANEMIA (H): Primary | ICD-10-CM

## 2021-12-15 DIAGNOSIS — D59.19 OTHER AUTOIMMUNE HEMOLYTIC ANEMIA (H): ICD-10-CM

## 2021-12-15 LAB
ALBUMIN SERPL-MCNC: 3.7 G/DL (ref 3.4–5)
ALP SERPL-CCNC: 88 U/L (ref 40–150)
ALT SERPL W P-5'-P-CCNC: 21 U/L (ref 0–50)
ANION GAP SERPL CALCULATED.3IONS-SCNC: 7 MMOL/L (ref 3–14)
AST SERPL W P-5'-P-CCNC: 19 U/L (ref 0–45)
BASOPHILS # BLD AUTO: 0 10E3/UL (ref 0–0.2)
BASOPHILS NFR BLD AUTO: 0 %
BILIRUB SERPL-MCNC: 0.6 MG/DL (ref 0.2–1.3)
BUN SERPL-MCNC: 15 MG/DL (ref 7–30)
CALCIUM SERPL-MCNC: 8.6 MG/DL (ref 8.5–10.1)
CHLORIDE BLD-SCNC: 106 MMOL/L (ref 94–109)
CO2 SERPL-SCNC: 25 MMOL/L (ref 20–32)
CREAT SERPL-MCNC: 0.67 MG/DL (ref 0.52–1.04)
EOSINOPHIL # BLD AUTO: 0 10E3/UL (ref 0–0.7)
EOSINOPHIL NFR BLD AUTO: 0 %
ERYTHROCYTE [DISTWIDTH] IN BLOOD BY AUTOMATED COUNT: 13.9 % (ref 10–15)
GFR SERPL CREATININE-BSD FRML MDRD: 83 ML/MIN/1.73M2
GLUCOSE BLD-MCNC: 132 MG/DL (ref 70–99)
HCT VFR BLD AUTO: 32 % (ref 35–47)
HGB BLD-MCNC: 9.6 G/DL (ref 11.7–15.7)
IMM GRANULOCYTES # BLD: 0 10E3/UL
IMM GRANULOCYTES NFR BLD: 0 %
LDH SERPL L TO P-CCNC: 215 U/L (ref 81–234)
LYMPHOCYTES # BLD AUTO: 1.5 10E3/UL (ref 0.8–5.3)
LYMPHOCYTES NFR BLD AUTO: 31 %
MCH RBC QN AUTO: 26.7 PG (ref 26.5–33)
MCHC RBC AUTO-ENTMCNC: 30 G/DL (ref 31.5–36.5)
MCV RBC AUTO: 89 FL (ref 78–100)
MONOCYTES # BLD AUTO: 0.6 10E3/UL (ref 0–1.3)
MONOCYTES NFR BLD AUTO: 11 %
NEUTROPHILS # BLD AUTO: 2.9 10E3/UL (ref 1.6–8.3)
NEUTROPHILS NFR BLD AUTO: 58 %
NRBC # BLD AUTO: 0 10E3/UL
NRBC BLD AUTO-RTO: 0 /100
PLATELET # BLD AUTO: 173 10E3/UL (ref 150–450)
POTASSIUM BLD-SCNC: 3.7 MMOL/L (ref 3.4–5.3)
PROT SERPL-MCNC: 6.4 G/DL (ref 6.8–8.8)
RBC # BLD AUTO: 3.6 10E6/UL (ref 3.8–5.2)
RETICS # AUTO: 0.09 10E6/UL (ref 0.03–0.1)
RETICS/RBC NFR AUTO: 2.5 % (ref 0.5–2)
SODIUM SERPL-SCNC: 138 MMOL/L (ref 133–144)
WBC # BLD AUTO: 5 10E3/UL (ref 4–11)

## 2021-12-15 PROCEDURE — 99214 OFFICE O/P EST MOD 30 MIN: CPT | Performed by: PHYSICIAN ASSISTANT

## 2021-12-15 PROCEDURE — 82784 ASSAY IGA/IGD/IGG/IGM EACH: CPT | Performed by: INTERNAL MEDICINE

## 2021-12-15 PROCEDURE — 250N000011 HC RX IP 250 OP 636: Performed by: INTERNAL MEDICINE

## 2021-12-15 PROCEDURE — 96413 CHEMO IV INFUSION 1 HR: CPT

## 2021-12-15 PROCEDURE — 36415 COLL VENOUS BLD VENIPUNCTURE: CPT

## 2021-12-15 PROCEDURE — 82040 ASSAY OF SERUM ALBUMIN: CPT | Performed by: INTERNAL MEDICINE

## 2021-12-15 PROCEDURE — G0463 HOSPITAL OUTPT CLINIC VISIT: HCPCS | Mod: 25

## 2021-12-15 PROCEDURE — 258N000003 HC RX IP 258 OP 636: Performed by: INTERNAL MEDICINE

## 2021-12-15 PROCEDURE — 85025 COMPLETE CBC W/AUTO DIFF WBC: CPT | Performed by: INTERNAL MEDICINE

## 2021-12-15 PROCEDURE — 250N000013 HC RX MED GY IP 250 OP 250 PS 637: Performed by: INTERNAL MEDICINE

## 2021-12-15 PROCEDURE — 83010 ASSAY OF HAPTOGLOBIN QUANT: CPT | Performed by: INTERNAL MEDICINE

## 2021-12-15 PROCEDURE — 85045 AUTOMATED RETICULOCYTE COUNT: CPT | Performed by: INTERNAL MEDICINE

## 2021-12-15 PROCEDURE — 83615 LACTATE (LD) (LDH) ENZYME: CPT | Performed by: INTERNAL MEDICINE

## 2021-12-15 PROCEDURE — 96415 CHEMO IV INFUSION ADDL HR: CPT

## 2021-12-15 RX ORDER — ACETAMINOPHEN 325 MG/1
650 TABLET ORAL ONCE
Status: COMPLETED | OUTPATIENT
Start: 2021-12-15 | End: 2021-12-15

## 2021-12-15 RX ORDER — DIPHENHYDRAMINE HCL 25 MG
50 CAPSULE ORAL ONCE
Status: COMPLETED | OUTPATIENT
Start: 2021-12-15 | End: 2021-12-15

## 2021-12-15 RX ADMIN — RITUXIMAB-ABBS 700 MG: 10 INJECTION, SOLUTION INTRAVENOUS at 10:21

## 2021-12-15 RX ADMIN — ACETAMINOPHEN 650 MG: 325 TABLET ORAL at 09:58

## 2021-12-15 RX ADMIN — SODIUM CHLORIDE 250 ML: 9 INJECTION, SOLUTION INTRAVENOUS at 10:03

## 2021-12-15 RX ADMIN — DIPHENHYDRAMINE HYDROCHLORIDE 50 MG: 25 CAPSULE ORAL at 09:58

## 2021-12-15 ASSESSMENT — MIFFLIN-ST. JEOR: SCORE: 1276.45

## 2021-12-15 ASSESSMENT — PAIN SCALES - GENERAL: PAINLEVEL: NO PAIN (0)

## 2021-12-15 NOTE — PROGRESS NOTES
Nursing Note:  Tricia Sosa presents today for PIV start for treatment and labs.    Patient seen by provider today: Yes: Trinidad   present during visit today: Not Applicable.    Note: N/A.    Intravenous Access:  Lab draw site L lower FA, Needle type angiocath, Gauge 22.  Labs drawn without difficulty.  Peripheral IV placed.    Discharge Plan:   Patient was sent to clinic for PA appointment.    STACY CHEATHAM RN

## 2021-12-15 NOTE — LETTER
12/15/2021         RE: Tricia Sosa  [address]  [facility] [address]        Dear Colleague,    Thank you for referring your patient, Tricia Sosa, to the [facility]. Please see a copy of my visit note below.    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 She was previously seen by Dr. Matos, then Dr. Summers and currently under the care of Dr. Boyd. Please see her note on 7/23/2020 for details of her hematology history.    She has been intermittently on prednisone and Rituxan for hemolytic anemia and saw Dr. Boyd on 7/23/2020.  Despite being on prednisone 60 mg daily, her hemoglobin dropped to 6.4 on 7/29, unfortunately.  She was started on weekly Rituxan infusions and completed 4 doses from 7/30-8/21/2020.  Her AIHA relapsed again and she was started on prednisone at 6/8/2021 at 60 mg daily with a slow taper, finished in August 2021.  Due to most recent relapse, she was started on prednisone again on 11/2/2021 at 70 mg daily with slow taper and Rituxan weekly x4 doses.  Rituxan C1 D1 completed on 12/6/2021.    She is here today for close follow-up and Rituxan #2.    Interval History: Tricia is doing ok.  She is found to be back on Rituxan, but understands the importance.  She admits to not drinking very much during the day and has been having some dizziness with position changes.  She has a history of vertigo, however this is different.  She is been having some intermittent stool incontinence over the last few months, but no leg weakness, saddle anesthesia, back pain or urinary incontinence.  No fever, chills.    Review of Systems: See interval hx. Denies fevers, chills, HA, n/t, changes in vision, cough, sore throat, CP, SOB, abdominal pain, N/V, diarrhea, changes in urination, bleeding,  bruising, rash.      PMHx and Social Hx reviewed per EPIC.      Medications:  Current Outpatient Medications   Medication Sig Dispense Refill     cyanocobalamin (VITAMIN  B-12) 1000 MCG tablet   3     Flaxseed, Linseed, (FLAX SEEDS PO) Take by mouth daily       folic acid (FOLVITE) 1 MG tablet   3     hydrochlorothiazide (HYDRODIURIL) 12.5 MG tablet Take 1 tablet (12.5 mg) by mouth daily as needed (edema) 30 tablet 5     hydrocortisone butyrate (LOCOID) 0.1 % SOLN solution Apply to scalp bid prn 60 mL 11     ketoconazole (NIZORAL) 2 % external shampoo Use every other day to scalp as needed 120 mL 11     levothyroxine (SYNTHROID/LEVOTHROID) 150 MCG tablet Take 1 tablet (150 mcg) by mouth daily 90 tablet 3     predniSONE (DELTASONE) 10 MG tablet Take 7 tablets (70 mg) by mouth daily 150 tablet 0     sulfamethoxazole-trimethoprim (BACTRIM DS) 800-160 MG tablet Take 1 pill orally on Mon, Wed and Friday 45 tablet 3     sulfamethoxazole-trimethoprim (BACTRIM) 400-80 MG tablet Take 1 tablet by mouth daily 90 tablet 0     triamcinolone (KENALOG) 0.1 % external cream        UNABLE TO FIND privigen inj every two months         Allergies   Allergen Reactions     Doxycycline        EXAM:    BP (!) 140/63   Pulse 78   Temp 97.9  F (36.6  C) (Tympanic)   Resp 16   Ht 1.829 m (6')   Wt 69.9 kg (154 lb 3.2 oz)   LMP  (LMP Unknown)   SpO2 97%   BMI 20.91 kg/m   Telephone visit, therefore vital signs and exam were not performed.    Labs:   Results for JC MARRUFO (MRN [id number]) as of 12/15/2021 15:43   12/15/2021 08:34   Sodium 138   Potassium 3.7   Chloride 106   Carbon Dioxide 25   Urea Nitrogen 15   Creatinine 0.67   GFR Estimate 83   Calcium 8.6   Anion Gap 7   Albumin 3.7   Protein Total 6.4 (L)   Bilirubin Total 0.6   Alkaline Phosphatase 88   ALT 21   AST 19   Lactate Dehydrogenase 215   Glucose 132 (H)   WBC 5.0   Hemoglobin 9.6 (L)   Hematocrit 32.0 (L)   Platelet Count 173   RBC Count 3.60 (L)   MCV 89   MCH  26.7   MCHC 30.0 (L)   RDW 13.9   % Neutrophils 58   % Lymphocytes 31   % Monocytes 11   % Eosinophils 0   % Basophils 0   Absolute Basophils 0.0   Absolute Eosinophils 0.0   Absolute Immature Granulocytes 0.0   Absolute Lymphocytes 1.5   Absolute Monocytes 0.6   % Immature Granulocytes 0   Absolute Neutrophils 2.9   Absolute NRBCs 0.0   NRBCs per 100 WBC 0   % Retic 2.5 (H)   Absolute Retic 0.091       Imaging: n/a    Impression/Plan: Tricia Sosa is a 79 year old female with autoimmune hemolytic anemia and IgA deficiency, alternating treatment on prednisone, IVIG and rituximab    Autoimmune hemolytic anemia: Relapsed in July 2020, started prednisone, then weekly Rituxan x4, completed in August 2020.  She continued her prednisone taper, had another relapse and started prednisone again with good response.  Another relapse in November 2021 and started prednisone with improvement of her hemoglobin.  Splenectomy was discussed, but she was not interested.  Most recently, hemoglobin dropped again to 7.9 and Dr. Boyd recommended prednisone 70 mg daily.  She also recommended weekly Rituxan x4 again.  Hemoglobin 9.6 today and plan for Rituxan #2.  She has been having some intermittent dizzy spells with position changes, see below.  She will also continue prednisone and will start to taper slowly again.  She will complete Rituxan x4 doses and then follow-up with Dr. Boyd in January 2022.  She will call sooner if needed.  --1/18 Dr. Boyd    CVID: Receiving IVIG every other month, last dose 11/23/2021.  Infectious disease referral placed last month, we will check on that again today.    Stool incontinence: New over the last few months.  No leg weakness, back pain, saddle anesthesia or urinary incontinence.  We will monitor.    Urinary hesitancy: This is been ongoing for 20+ years, but worsened during 2 weeks.  Symptoms are improved.      Chart documentation with Dragon Voice recognition Software. Although reviewed after  completion, some words and grammatical errors may remain.      Trinidad Liu PA-C  Hematology/Oncology  [facility]                      Again, thank you for allowing me to participate in the care of your patient.        Sincerely,        Trinidad Liu PA-C

## 2021-12-15 NOTE — NURSING NOTE
Oncology Rooming Note    December 15, 2021 8:49 AM   Tricia Sosa is a 81 year old female who presents for:    Chief Complaint   Patient presents with     Oncology Clinic Visit     CVID (common variable immunodeficiency     Initial Vitals: BP (!) 140/63   Pulse 78   Temp 97.9  F (36.6  C) (Tympanic)   Resp 16   Ht 1.829 m (6')   Wt 69.9 kg (154 lb 3.2 oz)   LMP  (LMP Unknown)   SpO2 97%   BMI 20.91 kg/m   Estimated body mass index is 20.91 kg/m  as calculated from the following:    Height as of this encounter: 1.829 m (6').    Weight as of this encounter: 69.9 kg (154 lb 3.2 oz). Body surface area is 1.88 meters squared.  No Pain (0) Comment: Data Unavailable   No LMP recorded (lmp unknown). Patient is postmenopausal.  Allergies reviewed: Yes  Medications reviewed: Yes    Medications: Medication refills not needed today.  Pharmacy name entered into [facility]:    [place], [place] - [address] [id number] [place], [place] - [address] [facility]    Clinical concerns: follow up       Montserrat Hutchins, MOHSEN

## 2021-12-15 NOTE — PROGRESS NOTES
Infusion Nursing Note:  Tricia Sosa presents today for C1D8 Rituxan.    Patient seen by provider today: Yes: CHELSEA Lucia   present during visit today: Not Applicable.    Note:   Patient tolerated Rituxan infusion on 12/6. Per Lizette Lopez, patient qualifies for rapid infusion today. Infusion started 200mL/hr for 30mins, increased to 400mL/hr for remainder of infusion.     Noted PIV on left lower forearm infiltrated approximiately 1hr and 10min into Rituxan infusion. Infusion was stopped and applied warm compresses to site per infiltration protocol. Site was edematous, no redness noted.  New PIV placed in right lower arm and resumed infusion via new PIV. Blood return noted pre and post infusion.       Intravenous Access:  Peripheral IV placed.  Labs drawn by FT RN.    Treatment Conditions:  Biological Infusion Checklist:  ~~~ NOTE: If the patient answers yes to any of the questions below, hold the infusion and contact ordering provider or on-call provider.    1. Have you recently had an elevated temperature, fever, chills, productive cough, coughing for 3 weeks or longer or hemoptysis, abnormal vital signs, night sweats,  chest pain or have you noticed a decrease in your appetite, unexplained weight loss or fatigue? No  2. Do you have any open wounds or new incisions? No  3. Do you have any recent or upcoming hospitalizations, surgeries or dental procedures? Yes, Bridge placement tomorrow. OK per lizette Lopez, to proceed with infuion  4. Do you currently have or recently have had any signs of illness or infection or are you on any antibiotics? No  5. Have you had any new, sudden or worsening abdominal pain? No  6. Have you or anyone in your household received a live vaccination in the past 4 weeks? Please note:  No live vaccines while on biologic/chemotherapy until 6 months after the last treatment.  Patient can receive the flu vaccine (shot only) and the pneumovax.  It is optimal for the  patient to get these vaccines mid cycle, but they can be given at any time as long as it is not on the day of the infusion. No  7. Have you recently been diagnosed with any new nervous system diseases (ie. Multiple sclerosis, Guillain [place], seizures, neurological changes) or cancer diagnosis? No  8. Are you on any form of radiation or chemotherapy? No  9. Are you pregnant or breast feeding or do you have plans of pregnancy in the future? No  10. Have you been having any signs of worsening depression or suicidal ideations?  (benlysta only) No  11. Have there been any other new onset medical symptoms? No        Post Infusion Assessment:  Patient tolerated infusion without incident.  Blood return noted from 2nd IV site pre and post infusion.  1st IV site on left forearm still with edema, no redness or pain. 2nd IV site  patent and intact, free from redness, edema or discomfort.  No evidence of extravasations. IV infiltration on left forearm. Moist warm compress applied.   Access discontinued per protocol.    Biologic Infusion Post Education: Call the triage nurse at your clinic or seek medical attention if you have chills and/or temperature greater than or equal to 100.5, uncontrolled nausea/vomiting, diarrhea, constipation, dizziness, shortness of breath, chest pain, heart palpitations, weakness or any other new or concerning symptoms, questions or concerns.  You cannot have any live virus vaccines prior to or during treatment or up to 6 months post infusion.  If you have an upcoming surgery, medical procedure or dental procedure during treatment, this should be discussed with your ordering physician and your surgeon/dentist.  If you are having any concerning symptom, if you are unsure if you should get your next infusion or wish to speak to a provider before your next infusion, please call your care coordinator or triage nurse at your clinic to notify them so we can adequately serve you.       Discharge Plan:   NANYS  to patient via [employer].  Patient will return 12/21 for next appointment.   Patient discharged in stable condition accompanied by: self.  Departure Mode: ambulatory with cane.      Sharan Schaefer RN

## 2021-12-16 LAB
HAPTOGLOB SERPL-MCNC: <3 MG/DL (ref 32–197)
IGA SERPL-MCNC: <2 MG/DL (ref 84–499)
IGG SERPL-MCNC: 645 MG/DL (ref 610–1616)
IGM SERPL-MCNC: 12 MG/DL (ref 35–242)

## 2021-12-21 ENCOUNTER — INFUSION THERAPY VISIT (OUTPATIENT)
Dept: INFUSION THERAPY | Facility: CLINIC | Age: 81
End: 2021-12-21
Attending: INTERNAL MEDICINE
Payer: MEDICARE

## 2021-12-21 VITALS
HEART RATE: 78 BPM | BODY MASS INDEX: 21.09 KG/M2 | SYSTOLIC BLOOD PRESSURE: 125 MMHG | OXYGEN SATURATION: 97 % | RESPIRATION RATE: 16 BRPM | DIASTOLIC BLOOD PRESSURE: 64 MMHG | TEMPERATURE: 98.1 F | WEIGHT: 155.5 LBS

## 2021-12-21 DIAGNOSIS — D59.19 OTHER AUTOIMMUNE HEMOLYTIC ANEMIA (H): Primary | ICD-10-CM

## 2021-12-21 DIAGNOSIS — D83.9 CVID (COMMON VARIABLE IMMUNODEFICIENCY) (H): ICD-10-CM

## 2021-12-21 LAB
BASOPHILS # BLD AUTO: 0 10E3/UL (ref 0–0.2)
BASOPHILS NFR BLD AUTO: 0 %
EOSINOPHIL # BLD AUTO: 0 10E3/UL (ref 0–0.7)
EOSINOPHIL NFR BLD AUTO: 0 %
ERYTHROCYTE [DISTWIDTH] IN BLOOD BY AUTOMATED COUNT: 13.9 % (ref 10–15)
HCT VFR BLD AUTO: 31.5 % (ref 35–47)
HGB BLD-MCNC: 9.4 G/DL (ref 11.7–15.7)
IMM GRANULOCYTES # BLD: 0 10E3/UL
IMM GRANULOCYTES NFR BLD: 0 %
LYMPHOCYTES # BLD AUTO: 1.4 10E3/UL (ref 0.8–5.3)
LYMPHOCYTES NFR BLD AUTO: 30 %
MCH RBC QN AUTO: 26.3 PG (ref 26.5–33)
MCHC RBC AUTO-ENTMCNC: 29.8 G/DL (ref 31.5–36.5)
MCV RBC AUTO: 88 FL (ref 78–100)
MONOCYTES # BLD AUTO: 0.5 10E3/UL (ref 0–1.3)
MONOCYTES NFR BLD AUTO: 12 %
NEUTROPHILS # BLD AUTO: 2.6 10E3/UL (ref 1.6–8.3)
NEUTROPHILS NFR BLD AUTO: 58 %
NRBC # BLD AUTO: 0 10E3/UL
NRBC BLD AUTO-RTO: 0 /100
PLATELET # BLD AUTO: 168 10E3/UL (ref 150–450)
RBC # BLD AUTO: 3.58 10E6/UL (ref 3.8–5.2)
WBC # BLD AUTO: 4.5 10E3/UL (ref 4–11)

## 2021-12-21 PROCEDURE — 85025 COMPLETE CBC W/AUTO DIFF WBC: CPT | Performed by: INTERNAL MEDICINE

## 2021-12-21 PROCEDURE — 96366 THER/PROPH/DIAG IV INF ADDON: CPT

## 2021-12-21 PROCEDURE — 258N000003 HC RX IP 258 OP 636: Performed by: INTERNAL MEDICINE

## 2021-12-21 PROCEDURE — 36415 COLL VENOUS BLD VENIPUNCTURE: CPT | Performed by: INTERNAL MEDICINE

## 2021-12-21 PROCEDURE — 250N000011 HC RX IP 250 OP 636: Performed by: INTERNAL MEDICINE

## 2021-12-21 PROCEDURE — 250N000013 HC RX MED GY IP 250 OP 250 PS 637: Performed by: INTERNAL MEDICINE

## 2021-12-21 PROCEDURE — 96365 THER/PROPH/DIAG IV INF INIT: CPT

## 2021-12-21 RX ORDER — ACETAMINOPHEN 325 MG/1
650 TABLET ORAL ONCE
Status: COMPLETED | OUTPATIENT
Start: 2021-12-21 | End: 2021-12-21

## 2021-12-21 RX ORDER — DIPHENHYDRAMINE HCL 25 MG
50 CAPSULE ORAL ONCE
Status: COMPLETED | OUTPATIENT
Start: 2021-12-21 | End: 2021-12-21

## 2021-12-21 RX ORDER — DIPHENHYDRAMINE HCL 25 MG
25 CAPSULE ORAL ONCE
Status: CANCELLED
Start: 2021-12-29

## 2021-12-21 RX ADMIN — DIPHENHYDRAMINE HYDROCHLORIDE 50 MG: 25 CAPSULE ORAL at 10:18

## 2021-12-21 RX ADMIN — RITUXIMAB-ABBS 700 MG: 10 INJECTION, SOLUTION INTRAVENOUS at 10:40

## 2021-12-21 RX ADMIN — ACETAMINOPHEN 650 MG: 325 TABLET ORAL at 10:18

## 2021-12-21 RX ADMIN — SODIUM CHLORIDE 250 ML: 9 INJECTION, SOLUTION INTRAVENOUS at 10:21

## 2021-12-21 NOTE — PROGRESS NOTES
"Infusion Nursing Note:  Tricia Sosa presents today for Labs + Cycle 1, Day 15 Rituxan.    Patient seen by provider today: No   present during visit today: Not Applicable.    Note:  Patient reports is feeling well today.  Patient denies fevers, chills or signs of infection.  Please see note below regarding dental work on 12/16/21.    Rapid Rituxan Infusion:  Rituxan started at 200mls/hr x 30 minutes, then increased to 400mls/hr x 1 hour until completion.    Patient is requesting that Benadryl dose be 25mg PO with future Rituxan treatments due to causes her to feel very sleepy and \"almost out of it\".  Patient did begin to feel less tired with about 30 minutes remaining.  Patient's son is driving her home today.  Writer sent staff message to Dr. Boyd to request Benadryl dose be changed to 25mg PO.  Writer updated patient.      Intravenous Access:  Labs drawn without difficulty.  Peripheral IV placed.  PIV site C/D/I.  PIV flushes well + excellent blood return.    1210: IV pump beeping downstream occlusion.  PIV catheter appears to be bent/positional. PIV catheter repositioned.  PIV flushes well + excellent blood return.  PIV very positional and continues to bend and occlude.  PIV discontinued. No infiltration of Rituxan present.    1225:  PIV right lower forearm.  PIV site C/D/I. PIV flushes well + excellent blood return. PIV C/D/I + excellent blood return throughout remaining Rituxan infusion.    Treatment Conditions:  Biological Infusion Checklist:  ~~~ NOTE: If the patient answers yes to any of the questions below, hold the infusion and contact ordering provider or on-call provider.    1. Have you recently had an elevated temperature, fever, chills, productive cough, coughing for 3 weeks or longer or hemoptysis, abnormal vital signs, night sweats,  chest pain or have you noticed a decrease in your appetite, unexplained weight loss or fatigue? No  2. Do you have any open wounds or new incisions? " No  3. Do you have any recent or upcoming hospitalizations, surgeries or dental procedures? Yes-Dental work on 12/16/21 (Bridge upper left). Writer reviewed with Dr. Boyd.  Vael to proceed with Rituxan today with recent dental work on 12/16/21.  4. Do you currently have or recently have had any signs of illness or infection or are you on any antibiotics? Bactrim Q Monday, Wednesday and Friday.  5. Have you had any new, sudden or worsening abdominal pain? No  6. Have you or anyone in your household received a live vaccination in the past 4 weeks? Please note:  No live vaccines while on biologic/chemotherapy until 6 months after the last treatment.  Patient can receive the flu vaccine (shot only) and the pneumovax.  It is optimal for the patient to get these vaccines mid cycle, but they can be given at any time as long as it is not on the day of the infusion. No  7. Have you recently been diagnosed with any new nervous system diseases (ie. Multiple sclerosis, Guillain [place], seizures, neurological changes) or cancer diagnosis? No  8. Are you on any form of radiation or chemotherapy? No  9. Are you pregnant or breast feeding or do you have plans of pregnancy in the future? N/A  10. Have you been having any signs of worsening depression or suicidal ideations?  (benlysta only) N/A  11. Have there been any other new onset medical symptoms? No    Post Infusion Assessment:  Patient tolerated infusion without incident.  Blood return noted pre and post infusion.  Site patent and intact, free from redness, edema or discomfort.  No evidence of extravasations.  Access discontinued per protocol.     Discharge Plan:   Discharge instructions reviewed with: Patient.  Patient and/or family verbalized understanding of discharge instructions and all questions answered.  Patient discharged in stable condition accompanied by: self.  Departure Mode: Ambulatory + Cane.  12/29/21: Labs + Cycle 1, Day 22 Rituxan.  1/6/22+ 1/14/22:  Labs.  1/18/22: Labs, Appointment with Dr. Boyd + IVIG.    Fadumo Caldera, RN, BSN, OCN on 12/21/2021 at 1:27 PM

## 2021-12-29 ENCOUNTER — INFUSION THERAPY VISIT (OUTPATIENT)
Dept: INFUSION THERAPY | Facility: CLINIC | Age: 81
End: 2021-12-29
Attending: INTERNAL MEDICINE
Payer: MEDICARE

## 2021-12-29 VITALS
RESPIRATION RATE: 16 BRPM | WEIGHT: 159.4 LBS | HEART RATE: 73 BPM | TEMPERATURE: 98.1 F | SYSTOLIC BLOOD PRESSURE: 139 MMHG | BODY MASS INDEX: 21.62 KG/M2 | OXYGEN SATURATION: 96 % | DIASTOLIC BLOOD PRESSURE: 64 MMHG

## 2021-12-29 DIAGNOSIS — D83.9 CVID (COMMON VARIABLE IMMUNODEFICIENCY) (H): ICD-10-CM

## 2021-12-29 DIAGNOSIS — D59.19 OTHER AUTOIMMUNE HEMOLYTIC ANEMIA (H): ICD-10-CM

## 2021-12-29 DIAGNOSIS — D59.19 OTHER AUTOIMMUNE HEMOLYTIC ANEMIA (H): Primary | ICD-10-CM

## 2021-12-29 LAB
BASOPHILS # BLD AUTO: 0 10E3/UL (ref 0–0.2)
BASOPHILS NFR BLD AUTO: 0 %
EOSINOPHIL # BLD AUTO: 0 10E3/UL (ref 0–0.7)
EOSINOPHIL NFR BLD AUTO: 0 %
ERYTHROCYTE [DISTWIDTH] IN BLOOD BY AUTOMATED COUNT: 14.2 % (ref 10–15)
HCT VFR BLD AUTO: 31.7 % (ref 35–47)
HGB BLD-MCNC: 9.3 G/DL (ref 11.7–15.7)
IMM GRANULOCYTES # BLD: 0 10E3/UL
IMM GRANULOCYTES NFR BLD: 0 %
LYMPHOCYTES # BLD AUTO: 1.1 10E3/UL (ref 0.8–5.3)
LYMPHOCYTES NFR BLD AUTO: 24 %
MCH RBC QN AUTO: 26.2 PG (ref 26.5–33)
MCHC RBC AUTO-ENTMCNC: 29.3 G/DL (ref 31.5–36.5)
MCV RBC AUTO: 89 FL (ref 78–100)
MONOCYTES # BLD AUTO: 0.5 10E3/UL (ref 0–1.3)
MONOCYTES NFR BLD AUTO: 12 %
NEUTROPHILS # BLD AUTO: 3 10E3/UL (ref 1.6–8.3)
NEUTROPHILS NFR BLD AUTO: 64 %
NRBC # BLD AUTO: 0 10E3/UL
NRBC BLD AUTO-RTO: 0 /100
PLATELET # BLD AUTO: 167 10E3/UL (ref 150–450)
RBC # BLD AUTO: 3.55 10E6/UL (ref 3.8–5.2)
WBC # BLD AUTO: 4.7 10E3/UL (ref 4–11)

## 2021-12-29 PROCEDURE — 36415 COLL VENOUS BLD VENIPUNCTURE: CPT | Performed by: INTERNAL MEDICINE

## 2021-12-29 PROCEDURE — 250N000013 HC RX MED GY IP 250 OP 250 PS 637: Performed by: INTERNAL MEDICINE

## 2021-12-29 PROCEDURE — 96366 THER/PROPH/DIAG IV INF ADDON: CPT

## 2021-12-29 PROCEDURE — 85018 HEMOGLOBIN: CPT | Performed by: INTERNAL MEDICINE

## 2021-12-29 PROCEDURE — 250N000011 HC RX IP 250 OP 636: Performed by: INTERNAL MEDICINE

## 2021-12-29 PROCEDURE — 96365 THER/PROPH/DIAG IV INF INIT: CPT

## 2021-12-29 PROCEDURE — 258N000003 HC RX IP 258 OP 636: Performed by: INTERNAL MEDICINE

## 2021-12-29 RX ORDER — DIPHENHYDRAMINE HCL 25 MG
25 CAPSULE ORAL ONCE
Status: COMPLETED | OUTPATIENT
Start: 2021-12-29 | End: 2021-12-29

## 2021-12-29 RX ORDER — ACETAMINOPHEN 325 MG/1
650 TABLET ORAL ONCE
Status: COMPLETED | OUTPATIENT
Start: 2021-12-29 | End: 2021-12-29

## 2021-12-29 RX ADMIN — RITUXIMAB-ABBS 700 MG: 10 INJECTION, SOLUTION INTRAVENOUS at 10:44

## 2021-12-29 RX ADMIN — DIPHENHYDRAMINE HYDROCHLORIDE 25 MG: 25 CAPSULE ORAL at 10:06

## 2021-12-29 RX ADMIN — ACETAMINOPHEN 650 MG: 325 TABLET ORAL at 10:06

## 2021-12-29 NOTE — PROGRESS NOTES
Infusion Nursing Note:  Tricia Sosa presents today for Rituxan.    Patient seen by provider today: No   present during visit today: Not Applicable.    Note: Benadryl 25mg po given and patient tolerated Rapid infusion   200ml/hr for 30 minutes and 400ml/h for 1 hour      Intravenous Access:  Peripheral IV placed.    Treatment Conditions:  Lab Results   Component Value Date    HGB 9.3 (L) 12/29/2021    WBC 4.7 12/29/2021    ANEU 6.8 07/06/2021    ANEUTAUTO 3.0 12/29/2021     12/29/2021      Results reviewed, labs MET treatment parameters, ok to proceed with treatment.  Biological Infusion Checklist:  ~~~ NOTE: If the patient answers yes to any of the questions below, hold the infusion and contact ordering provider or on-call provider.    1. Have you recently had an elevated temperature, fever, chills, productive cough, coughing for 3 weeks or longer or hemoptysis, abnormal vital signs, night sweats,  chest pain or have you noticed a decrease in your appetite, unexplained weight loss or fatigue? No  2. Do you have any open wounds or new incisions? No  3. Do you have any recent or upcoming hospitalizations, surgeries or dental procedures? No  4. Do you currently have or recently have had any signs of illness or infection or are you on any antibiotics? No  5. Have you had any new, sudden or worsening abdominal pain? No  6. Have you or anyone in your household received a live vaccination in the past 4 weeks? Please note:  No live vaccines while on biologic/chemotherapy until 6 months after the last treatment.  Patient can receive the flu vaccine (shot only) and the pneumovax.  It is optimal for the patient to get these vaccines mid cycle, but they can be given at any time as long as it is not on the day of the infusion. No  7. Have you recently been diagnosed with any new nervous system diseases (ie. Multiple sclerosis, Guillain [place], seizures, neurological changes) or cancer diagnosis? No  8. Are  you on any form of radiation or chemotherapy? No  9. Are you pregnant or breast feeding or do you have plans of pregnancy in the future? No  10. Have you been having any signs of worsening depression or suicidal ideations?  (benlysta only) No  11. Have there been any other new onset medical symptoms? No        Post Infusion Assessment:  Patient tolerated infusion without incident.  Site patent and intact, free from redness, edema or discomfort.  No evidence of extravasations.  Access discontinued per protocol.  Biologic Infusion Post Education: Call the triage nurse at your clinic or seek medical attention if you have chills and/or temperature greater than or equal to 100.5, uncontrolled nausea/vomiting, diarrhea, constipation, dizziness, shortness of breath, chest pain, heart palpitations, weakness or any other new or concerning symptoms, questions or concerns.  You cannot have any live virus vaccines prior to or during treatment or up to 6 months post infusion.  If you have an upcoming surgery, medical procedure or dental procedure during treatment, this should be discussed with your ordering physician and your surgeon/dentist.  If you are having any concerning symptom, if you are unsure if you should get your next infusion or wish to speak to a provider before your next infusion, please call your care coordinator or triage nurse at your clinic to notify them so we can adequately serve you.       Discharge Plan:   Copy of AVS reviewed with patient and/or family.  Patient will return 1/18 for next appointment with Dr. Boyd  Patient discharged in stable condition accompanied by: self.  Departure Mode: Ambulatory with marcial Valentin RN

## 2022-01-03 ENCOUNTER — VIRTUAL VISIT (OUTPATIENT)
Dept: SLEEP MEDICINE | Facility: CLINIC | Age: 82
End: 2022-01-03
Payer: MEDICARE

## 2022-01-03 VITALS — WEIGHT: 155 LBS | HEIGHT: 72 IN | BODY MASS INDEX: 20.99 KG/M2

## 2022-01-03 DIAGNOSIS — G47.33 MILD OBSTRUCTIVE SLEEP APNEA: Primary | ICD-10-CM

## 2022-01-03 PROCEDURE — 99443 PR PHYSICIAN TELEPHONE EVALUATION 21-30 MIN: CPT | Mod: 95 | Performed by: PHYSICIAN ASSISTANT

## 2022-01-03 RX ORDER — PREDNISONE 10 MG/1
TABLET ORAL
Qty: 100 TABLET | Refills: 0 | Status: SHIPPED | OUTPATIENT
Start: 2022-01-03 | End: 2022-03-15

## 2022-01-03 ASSESSMENT — SLEEP AND FATIGUE QUESTIONNAIRES
HOW LIKELY ARE YOU TO NOD OFF OR FALL ASLEEP WHILE SITTING QUIETLY AFTER LUNCH WITHOUT ALCOHOL: SLIGHT CHANCE OF DOZING
HOW LIKELY ARE YOU TO NOD OFF OR FALL ASLEEP WHEN YOU ARE A PASSENGER IN A CAR FOR AN HOUR WITHOUT A BREAK: MODERATE CHANCE OF DOZING
HOW LIKELY ARE YOU TO NOD OFF OR FALL ASLEEP WHILE LYING DOWN TO REST IN THE AFTERNOON WHEN CIRCUMSTANCES PERMIT: MODERATE CHANCE OF DOZING
HOW LIKELY ARE YOU TO NOD OFF OR FALL ASLEEP WHILE WATCHING TV: WOULD NEVER DOZE
HOW LIKELY ARE YOU TO NOD OFF OR FALL ASLEEP WHILE SITTING AND TALKING TO SOMEONE: WOULD NEVER DOZE
HOW LIKELY ARE YOU TO NOD OFF OR FALL ASLEEP IN A CAR, WHILE STOPPED FOR A FEW MINUTES IN TRAFFIC: WOULD NEVER DOZE
HOW LIKELY ARE YOU TO NOD OFF OR FALL ASLEEP WHILE SITTING AND READING: WOULD NEVER DOZE
HOW LIKELY ARE YOU TO NOD OFF OR FALL ASLEEP WHILE SITTING INACTIVE IN A PUBLIC PLACE: WOULD NEVER DOZE

## 2022-01-03 ASSESSMENT — MIFFLIN-ST. JEOR: SCORE: 1280.08

## 2022-01-03 NOTE — TELEPHONE ENCOUNTER
Pending Prescriptions:                       Disp   Refills    predniSONE (DELTASONE) 10 MG tablet [Phar*100 ta*0            Sig: Take 3 tablets (30 mg) by mouth daily or per MD           taper directions          Last Written Prescription Date:  11/19/2021  Last Fill Quantity: 150,   # refills: 0  Last Office Visit: 12/15/2021  Future Office visit:    Next 5 appointments (look out 90 days)    Jan 18, 2022  9:15 AM  Return Visit with Sabina Boyd MD  [facility] [address]  [address]  [facility] [address]  [phone]           Routing refill request to Dr Boyd for approval. Pt called for refill and states she is currently on 30 mg daily and next cbc is 1/6/2022.    Tanya Piper RN, BSN, OCN

## 2022-01-03 NOTE — TELEPHONE ENCOUNTER
Signed Prescriptions:                        Disp   Refills    predniSONE (DELTASONE) 10 MG tablet        100 ta*0        Sig: Take 3 tablets (30 mg) by mouth daily or per MD taper           directions  Authorizing Provider: LEON BOYD    Pt called and informed that prescription sent to pharmacy per Dr Boyd.  Pt aware.    Tanya Piper, RN, BSN, OCN

## 2022-01-03 NOTE — PROGRESS NOTES
[facility]   Outpatient Sleep Medicine  Zachary 3, 2022       Name: Tricia Sosa MRN# [id number]   Age: 81 year old YOB: 1940            Assessment and Plan:   1. Mild obstructive sleep apnea  Patient's sleep apnea is adequately managed and well treated with current PAP settings [id number] with low residual AHI of 2.26 events per hour. Compliance is excellent. Tolerating PAP well. No indication to adjust pressure settings today. Will continue nightly therapy. Prescription renewed for mask and supplies.     - Comprehensive DME    Tricia Sosa will follow up in about 1 year for annual PAP follow-up, or sooner as needed.        Chief Complaint      Chief Complaint   Patient presents with     Telephone     Has new CPAP machine due to recall, yearly fall up          History of Present Illness:     Tricia Sosa is a 81 year old female who presents to the clinic for routine follow-up of their mild obstructive sleep apnea managed with CPAP.    Originally diagnosed via PSG on 11/3/2019 (144#, BMI 19.7) - AHI 6.5, RDI 7.6, Supine AHI 11.5, REM AHI 33.4, Low O2 84.7%, Time Spent ?88% 0 minutes / Time Spent ?89% 0.1 minutes. No PLM or abnormal movements. Sleep archtecture revealed all sleep stages present with normal arousal index of 12.1/hour.     Set up with CPAP on 9/11/2020 with a [employer] Respironics DreamStation machine. Received replacement Dreamstation 2 in September 2021.     No questions or concerns today, routine follow-up visit. Patient is using a full face mask. The mask is comfortable. The mask is occasionally leaking, only if malpositioned. They are not snoring with the mask on as far as she knows. They are not having gasp arousals. They are not having significant oral/nasal dryness or epistaxis. They are not having pain/skin breakdown. The pressure settings are comfortable.     Bedtime is typically 10:30PM. Usually it takes about 10 minutes to fall asleep with the mask on.  Wake time is typically 6-7:00AM.  Patient is using PAP therapy 7 hours per night. Can struggle with insomnia but only if on prednisone, which she needs to take often for AHA.     Respironics Auto-PAP 5.0 - 15.0 cmH2O 30 day usage data:    100% of days with > 4 hours of use. 0/30 days with no use.   Average use 425 minutes per day.   Average leak 25.43 LPM.  Average % of night in large leak 3%.    CPAP 90% pressure 9.3cm.   AHI 2.26 events per hour.    SCALES:   INSOMNIA: Insomnia Severity Score: 9   SLEEPINESS: [place] Sleepiness Score: 7    Past medical/surgical history, family history, social history, medications and allergies were reviewed.           Physical Examination:   Ht 1.829 m (6')   Wt 70.3 kg (155 lb)   LMP  (LMP Unknown)   BMI 21.02 kg/m    General: Cooperative and pleasant. In no apparent distress.  Pulmonary:  Able to speak easily in full sentences. No cough or wheeze.   Neurologic: Alert, oriented x3.   Psychiatric: Mood euthymic. Affect congruent with full range and intensity.    CC:  Emily Church PA-C  Zachary 3, 2022     [facility]  [address] [place] [address]     [facility]  [address] [address]    Chart documentation was completed, in part, with [employer] voice-recognition software. Even though reviewed, some grammatical, spelling, and word errors may remain.    24 minutes spent on day of encounter doing chart review, history and exam, documentation, and further activities as noted above

## 2022-01-03 NOTE — PROGRESS NOTES
Tricia is a 81 year old who is being evaluated via a billable telephone visit.      What phone number would you like to be contacted at? [phone]  How would you like to obtain your AVS? Dax     Telephone call start time: 12:05PM  Telephone call end time: 12:20 PM  Judith Solis PA-C

## 2022-01-03 NOTE — PATIENT INSTRUCTIONS
Your BMI is Body mass index is 21.02 kg/m .  Weight management is a personal decision.  If you are interested in exploring weight loss strategies, the following discussion covers the approaches that may be successful. Body mass index (BMI) is one way to tell whether you are at a healthy weight, overweight, or obese. It measures your weight in relation to your height.  A BMI of 18.5 to 24.9 is in the healthy range. A person with a BMI of 25 to 29.9 is considered overweight, and someone with a BMI of 30 or greater is considered obese. More than two-thirds of American adults are considered overweight or obese.  Being overweight or obese increases the risk for further weight gain. Excess weight may lead to heart disease and diabetes.  Creating and following plans for healthy eating and physical activity may help you improve your health.  Weight control is part of healthy lifestyle and includes exercise, emotional health, and healthy eating habits. Careful eating habits lifelong are the mainstay of weight control. Though there are significant health benefits from weight loss, long-term weight loss with diet alone may be very difficult to achieve- studies show long-term success with dietary management in less than 10% of people. Attaining a healthy weight may be especially difficult to achieve in those with severe obesity. In some cases, medications, devices and surgical management might be considered.  What can you do?  If you are overweight or obese and are interested in methods for weight loss, you should discuss this with your provider.     Consider reducing daily calorie intake by 500 calories.     Keep a food journal.     Avoiding skipping meals, consider cutting portions instead.    Diet combined with exercise helps maintain muscle while optimizing fat loss. Strength training is particularly important for building and maintaining muscle mass. Exercise helps reduce stress, increase energy, and improves fitness.  Increasing exercise without diet control, however, may not burn enough calories to loose weight.       Start walking three days a week 10-20 minutes at a time    Work towards walking thirty minutes five days a week     Eventually, increase the speed of your walking for 1-2 minutes at time    In addition, we recommend that you review healthy lifestyles and methods for weight loss available through the National Institutes of Health patient information sites:  https://www.nhlbi.nih.gov/health-topics/overweight-and-obesity    And look into health and wellness programs that may be available through your health insurance provider, employer, local community center, or [employer].

## 2022-01-06 ENCOUNTER — LAB (OUTPATIENT)
Dept: ONCOLOGY | Facility: CLINIC | Age: 82
End: 2022-01-06
Attending: INTERNAL MEDICINE
Payer: MEDICARE

## 2022-01-06 DIAGNOSIS — D59.19 OTHER AUTOIMMUNE HEMOLYTIC ANEMIA (H): ICD-10-CM

## 2022-01-06 LAB
ALBUMIN SERPL-MCNC: 3.8 G/DL (ref 3.4–5)
ALP SERPL-CCNC: 87 U/L (ref 40–150)
ALT SERPL W P-5'-P-CCNC: 26 U/L (ref 0–50)
ANION GAP SERPL CALCULATED.3IONS-SCNC: 5 MMOL/L (ref 3–14)
AST SERPL W P-5'-P-CCNC: 17 U/L (ref 0–45)
BASOPHILS # BLD AUTO: 0 10E3/UL (ref 0–0.2)
BASOPHILS NFR BLD AUTO: 0 %
BILIRUB SERPL-MCNC: 0.5 MG/DL (ref 0.2–1.3)
BUN SERPL-MCNC: 13 MG/DL (ref 7–30)
CALCIUM SERPL-MCNC: 8.5 MG/DL (ref 8.5–10.1)
CHLORIDE BLD-SCNC: 107 MMOL/L (ref 94–109)
CO2 SERPL-SCNC: 26 MMOL/L (ref 20–32)
CREAT SERPL-MCNC: 0.72 MG/DL (ref 0.52–1.04)
EOSINOPHIL # BLD AUTO: 0 10E3/UL (ref 0–0.7)
EOSINOPHIL NFR BLD AUTO: 0 %
ERYTHROCYTE [DISTWIDTH] IN BLOOD BY AUTOMATED COUNT: 13.9 % (ref 10–15)
GFR SERPL CREATININE-BSD FRML MDRD: 84 ML/MIN/1.73M2
GLUCOSE BLD-MCNC: 105 MG/DL (ref 70–99)
HCT VFR BLD AUTO: 32.9 % (ref 35–47)
HGB BLD-MCNC: 9.6 G/DL (ref 11.7–15.7)
IMM GRANULOCYTES # BLD: 0 10E3/UL
IMM GRANULOCYTES NFR BLD: 0 %
LYMPHOCYTES # BLD AUTO: 1.4 10E3/UL (ref 0.8–5.3)
LYMPHOCYTES NFR BLD AUTO: 22 %
MCH RBC QN AUTO: 25.1 PG (ref 26.5–33)
MCHC RBC AUTO-ENTMCNC: 29.2 G/DL (ref 31.5–36.5)
MCV RBC AUTO: 86 FL (ref 78–100)
MONOCYTES # BLD AUTO: 0.6 10E3/UL (ref 0–1.3)
MONOCYTES NFR BLD AUTO: 10 %
NEUTROPHILS # BLD AUTO: 4 10E3/UL (ref 1.6–8.3)
NEUTROPHILS NFR BLD AUTO: 68 %
NRBC # BLD AUTO: 0 10E3/UL
NRBC BLD AUTO-RTO: 0 /100
PLATELET # BLD AUTO: 176 10E3/UL (ref 150–450)
POTASSIUM BLD-SCNC: 4.1 MMOL/L (ref 3.4–5.3)
PROT SERPL-MCNC: 6.3 G/DL (ref 6.8–8.8)
RBC # BLD AUTO: 3.82 10E6/UL (ref 3.8–5.2)
SODIUM SERPL-SCNC: 138 MMOL/L (ref 133–144)
WBC # BLD AUTO: 6.1 10E3/UL (ref 4–11)

## 2022-01-06 PROCEDURE — 36415 COLL VENOUS BLD VENIPUNCTURE: CPT

## 2022-01-06 PROCEDURE — 82040 ASSAY OF SERUM ALBUMIN: CPT | Performed by: INTERNAL MEDICINE

## 2022-01-06 PROCEDURE — 80053 COMPREHEN METABOLIC PANEL: CPT | Performed by: INTERNAL MEDICINE

## 2022-01-06 PROCEDURE — 85025 COMPLETE CBC W/AUTO DIFF WBC: CPT | Performed by: INTERNAL MEDICINE

## 2022-01-14 ENCOUNTER — LAB (OUTPATIENT)
Dept: ONCOLOGY | Facility: CLINIC | Age: 82
End: 2022-01-14
Attending: INTERNAL MEDICINE
Payer: MEDICARE

## 2022-01-14 DIAGNOSIS — D59.19 OTHER AUTOIMMUNE HEMOLYTIC ANEMIA (H): ICD-10-CM

## 2022-01-14 LAB
ALBUMIN SERPL-MCNC: 3.9 G/DL (ref 3.4–5)
ALP SERPL-CCNC: 89 U/L (ref 40–150)
ALT SERPL W P-5'-P-CCNC: 26 U/L (ref 0–50)
ANION GAP SERPL CALCULATED.3IONS-SCNC: 6 MMOL/L (ref 3–14)
AST SERPL W P-5'-P-CCNC: 18 U/L (ref 0–45)
BASOPHILS # BLD AUTO: 0 10E3/UL (ref 0–0.2)
BASOPHILS NFR BLD AUTO: 0 %
BILIRUB SERPL-MCNC: 0.8 MG/DL (ref 0.2–1.3)
BUN SERPL-MCNC: 16 MG/DL (ref 7–30)
CALCIUM SERPL-MCNC: 8.8 MG/DL (ref 8.5–10.1)
CHLORIDE BLD-SCNC: 108 MMOL/L (ref 94–109)
CO2 SERPL-SCNC: 25 MMOL/L (ref 20–32)
CREAT SERPL-MCNC: 0.63 MG/DL (ref 0.52–1.04)
EOSINOPHIL # BLD AUTO: 0 10E3/UL (ref 0–0.7)
EOSINOPHIL NFR BLD AUTO: 0 %
ERYTHROCYTE [DISTWIDTH] IN BLOOD BY AUTOMATED COUNT: 14.2 % (ref 10–15)
GFR SERPL CREATININE-BSD FRML MDRD: 89 ML/MIN/1.73M2
GLUCOSE BLD-MCNC: 93 MG/DL (ref 70–99)
HCT VFR BLD AUTO: 34.3 % (ref 35–47)
HGB BLD-MCNC: 10.3 G/DL (ref 11.7–15.7)
IMM GRANULOCYTES # BLD: 0 10E3/UL
IMM GRANULOCYTES NFR BLD: 0 %
LDH SERPL L TO P-CCNC: 214 U/L (ref 81–234)
LYMPHOCYTES # BLD AUTO: 1.2 10E3/UL (ref 0.8–5.3)
LYMPHOCYTES NFR BLD AUTO: 22 %
MCH RBC QN AUTO: 24.9 PG (ref 26.5–33)
MCHC RBC AUTO-ENTMCNC: 30 G/DL (ref 31.5–36.5)
MCV RBC AUTO: 83 FL (ref 78–100)
MONOCYTES # BLD AUTO: 0.7 10E3/UL (ref 0–1.3)
MONOCYTES NFR BLD AUTO: 12 %
NEUTROPHILS # BLD AUTO: 3.6 10E3/UL (ref 1.6–8.3)
NEUTROPHILS NFR BLD AUTO: 66 %
NRBC # BLD AUTO: 0 10E3/UL
NRBC BLD AUTO-RTO: 0 /100
PLATELET # BLD AUTO: 169 10E3/UL (ref 150–450)
POTASSIUM BLD-SCNC: 3.6 MMOL/L (ref 3.4–5.3)
PROT SERPL-MCNC: 6.4 G/DL (ref 6.8–8.8)
RBC # BLD AUTO: 4.13 10E6/UL (ref 3.8–5.2)
RETICS # AUTO: 0.08 10E6/UL (ref 0.03–0.1)
RETICS/RBC NFR AUTO: 1.9 % (ref 0.5–2)
SODIUM SERPL-SCNC: 139 MMOL/L (ref 133–144)
WBC # BLD AUTO: 5.5 10E3/UL (ref 4–11)

## 2022-01-14 PROCEDURE — 82040 ASSAY OF SERUM ALBUMIN: CPT | Performed by: INTERNAL MEDICINE

## 2022-01-14 PROCEDURE — 80053 COMPREHEN METABOLIC PANEL: CPT | Performed by: INTERNAL MEDICINE

## 2022-01-14 PROCEDURE — 82784 ASSAY IGA/IGD/IGG/IGM EACH: CPT | Performed by: INTERNAL MEDICINE

## 2022-01-14 PROCEDURE — 83010 ASSAY OF HAPTOGLOBIN QUANT: CPT | Performed by: INTERNAL MEDICINE

## 2022-01-14 PROCEDURE — 85025 COMPLETE CBC W/AUTO DIFF WBC: CPT | Performed by: INTERNAL MEDICINE

## 2022-01-14 PROCEDURE — 36415 COLL VENOUS BLD VENIPUNCTURE: CPT

## 2022-01-14 PROCEDURE — 85045 AUTOMATED RETICULOCYTE COUNT: CPT | Performed by: INTERNAL MEDICINE

## 2022-01-14 PROCEDURE — 83615 LACTATE (LD) (LDH) ENZYME: CPT | Performed by: INTERNAL MEDICINE

## 2022-01-17 LAB
HAPTOGLOB SERPL-MCNC: <3 MG/DL (ref 32–197)
IGA SERPL-MCNC: <2 MG/DL (ref 84–499)
IGG SERPL-MCNC: 462 MG/DL (ref 610–1616)
IGM SERPL-MCNC: <10 MG/DL (ref 35–242)

## 2022-01-18 ENCOUNTER — INFUSION THERAPY VISIT (OUTPATIENT)
Dept: INFUSION THERAPY | Facility: CLINIC | Age: 82
End: 2022-01-18
Attending: INTERNAL MEDICINE
Payer: MEDICARE

## 2022-01-18 ENCOUNTER — ONCOLOGY VISIT (OUTPATIENT)
Dept: ONCOLOGY | Facility: CLINIC | Age: 82
End: 2022-01-18
Attending: INTERNAL MEDICINE
Payer: MEDICARE

## 2022-01-18 VITALS
WEIGHT: 159 LBS | TEMPERATURE: 97.6 F | BODY MASS INDEX: 21.56 KG/M2 | HEART RATE: 72 BPM | DIASTOLIC BLOOD PRESSURE: 67 MMHG | OXYGEN SATURATION: 97 % | SYSTOLIC BLOOD PRESSURE: 157 MMHG | RESPIRATION RATE: 16 BRPM

## 2022-01-18 DIAGNOSIS — D83.9 CVID (COMMON VARIABLE IMMUNODEFICIENCY) (H): Primary | ICD-10-CM

## 2022-01-18 DIAGNOSIS — D59.10 AUTOIMMUNE HEMOLYTIC ANEMIA (H): ICD-10-CM

## 2022-01-18 DIAGNOSIS — D59.10 AUTOIMMUNE HEMOLYTIC ANEMIA (H): Primary | ICD-10-CM

## 2022-01-18 PROCEDURE — 96366 THER/PROPH/DIAG IV INF ADDON: CPT

## 2022-01-18 PROCEDURE — 96375 TX/PRO/DX INJ NEW DRUG ADDON: CPT

## 2022-01-18 PROCEDURE — 250N000011 HC RX IP 250 OP 636: Performed by: INTERNAL MEDICINE

## 2022-01-18 PROCEDURE — 96365 THER/PROPH/DIAG IV INF INIT: CPT

## 2022-01-18 PROCEDURE — 250N000013 HC RX MED GY IP 250 OP 250 PS 637: Performed by: INTERNAL MEDICINE

## 2022-01-18 PROCEDURE — 99214 OFFICE O/P EST MOD 30 MIN: CPT | Performed by: INTERNAL MEDICINE

## 2022-01-18 RX ORDER — ACETAMINOPHEN 325 MG/1
650 TABLET ORAL ONCE
Status: CANCELLED
Start: 2022-03-15

## 2022-01-18 RX ORDER — HEPARIN SODIUM,PORCINE 10 UNIT/ML
5 VIAL (ML) INTRAVENOUS
Status: CANCELLED | OUTPATIENT
Start: 2022-03-15

## 2022-01-18 RX ORDER — DIPHENHYDRAMINE HCL 25 MG
50 CAPSULE ORAL ONCE
Status: CANCELLED | OUTPATIENT
Start: 2022-03-15

## 2022-01-18 RX ORDER — ACETAMINOPHEN 325 MG/1
650 TABLET ORAL ONCE
Status: COMPLETED | OUTPATIENT
Start: 2022-01-18 | End: 2022-01-18

## 2022-01-18 RX ORDER — HEPARIN SODIUM (PORCINE) LOCK FLUSH IV SOLN 100 UNIT/ML 100 UNIT/ML
5 SOLUTION INTRAVENOUS
Status: CANCELLED | OUTPATIENT
Start: 2022-03-15

## 2022-01-18 RX ADMIN — HUMAN IMMUNOGLOBULIN G 35 G: 20 LIQUID INTRAVENOUS at 10:15

## 2022-01-18 RX ADMIN — ACETAMINOPHEN 650 MG: 325 TABLET ORAL at 10:08

## 2022-01-18 RX ADMIN — HYDROCORTISONE SODIUM SUCCINATE 100 MG: 100 INJECTION, POWDER, FOR SOLUTION INTRAMUSCULAR; INTRAVENOUS at 10:14

## 2022-01-18 ASSESSMENT — PAIN SCALES - GENERAL: PAINLEVEL: NO PAIN (0)

## 2022-01-18 NOTE — PROGRESS NOTES
Infusion Nursing Note:  Tricia JUNIOR GoncalvesIan presents today for IVIG.    Patient seen by provider today: Yes: Oliver   present during visit today: Not Applicable.    Note: Patient has an appointment for a tooth extraction 1/24.  Dr Boyd notified.  VORB: Ok to give IVIG today and no need to change tooth extraction appointment.    Tylenol and solumedrol given as premedications.  Patient usually doesn't take Tylenol prior, but was having tooth pain and felt it would help.    Started infusion at 0.5 ml/kg/hr x 15 minutes. Increase rate to 1 mg/kg/hr x 15 min to a maximum of 4 ml/kg/hr.       Intravenous Access:  Peripheral IV placed.    Treatment Conditions:  Biological Infusion Checklist:  ~~~ NOTE: If the patient answers yes to any of the questions below, hold the infusion and contact ordering provider or on-call provider.    1. Have you recently had an elevated temperature, fever, chills, productive cough, coughing for 3 weeks or longer or hemoptysis, abnormal vital signs, night sweats,  chest pain or have you noticed a decrease in your appetite, unexplained weight loss or fatigue? No  2. Do you have any open wounds or new incisions? No  3. Do you have any recent or upcoming hospitalizations, surgeries or dental procedures? Yes, tooth extraction on 1/24  4. Do you currently have or recently have had any signs of illness or infection or are you on any antibiotics? Yes, prophlyatic antibiotic  5. Have you had any new, sudden or worsening abdominal pain? No  6. Have you or anyone in your household received a live vaccination in the past 4 weeks? Please note:  No live vaccines while on biologic/chemotherapy until 6 months after the last treatment.  Patient can receive the flu vaccine (shot only) and the pneumovax.  It is optimal for the patient to get these vaccines mid cycle, but they can be given at any time as long as it is not on the day of the infusion. No  7. Have you recently been diagnosed with any new  nervous system diseases (ie. Multiple sclerosis, Guillain [place], seizures, neurological changes) or cancer diagnosis? No  8. Are you on any form of radiation or chemotherapy? No  9. Are you pregnant or breast feeding or do you have plans of pregnancy in the future? No  10. Have you been having any signs of worsening depression or suicidal ideations?  (benlysta only) No  11. Have there been any other new onset medical symptoms? No        Post Infusion Assessment:  Patient tolerated infusion without incident.  Site patent and intact, free from redness, edema or discomfort.  No evidence of extravasations.  Access discontinued per protocol.       Discharge Plan:   AVS to patient via MYCHART.   Patient discharged in stable condition accompanied by: self.  Departure Mode: Ambulatory.      Ally Eller RN

## 2022-01-18 NOTE — PROGRESS NOTES
[facility]    Hematology/Oncology Established Patient Follow-up Note      Today's Date: 1/18/22    Reason for Follow-up: Hemolytic anemia-autoimmune; IgA deficiency    HISTORY OF PRESENT ILLNESS: Tricia Sosa is a 81 year old female who presents with autoimmune hemolytic anemia and IgA deficiency.  She previously saw Dr. Matos and then Dr. Summers.  She was previously seen at [facility].    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  She was very sick at the time and had severe diarrhea.  She lost about 40 pounds in weight.  The workup revealed that she had markedly diminished CD4 counts of 48 and was diagnosed with CMV colitis.  Since then she has been followed in hematology clinic at [place] until recently.  She was noted to have anemia which was fairly long-standing.  She was initially referred for anemia in 2004 and also had a bone marrow biopsy done at that time which revealed hypercellular bone marrow with 70-80% cellularity and normal trilineage hematopoiesis and no morphologic features of MDS or lymphoproliferation.  Her anemia was attributed to her chronic underlying disease.  At the next evaluation in 2002 on 4 aggressive anemia there was no evidence of hemolysis, iron deficiency, B12 or folate deficiency.  Bone marrow biopsy was not pursued.  In summer of 2015 she had progressive fatigue, dyspnea on exertion and worsening anemia with a hemoglobin now of 9.  Workup at this time did suggest a hemolytic anemia with elevated LDH at 709, undetectable haptoglobin and elevated unconjugated bilirubin at 3.3.  Monospecific MARIKA was positive for both IgG and complement.  Cold agglutinin screen was positive with very low titer 1:64.  She was started on prednisone 60 mg daily with supplementation of iron folate and B12 starting 7/31/15.  Her prednisone has been slowly tapered and was discontinued off in January 2016.  She was last followed at [facility]  "on March 10, 2016 when she had stable hemoglobin.    She was followed by Dr. Matos and Dr. Summers.  Due to relapse of hemolytic anemia, she underwent another course of prednisone that has since been tapered off and rituximab x 4 weekly doses completed in 9/19/19.    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  She started weekly Rituxan on 7/31/20 x 4 doses, completed on 8/21/20.  She tapered off of prednisone in October 2020.    Due to relapse of her AIHA, she started prednisone again on 6/8/21 at 60 mg daily with slow taper and finished taper in August 2021.    Due to relapse of AIHA again, she started prednisone again on 11/2/21 at 70 mg daily with slow taper.  She also completed weekly Rituxuan again x 4 doses 12/6/21-12/29/21.      INTERIM HISTORY: Tricia is doing okay, \"about the same.\"  She finished Rituxan.  She says that after the first infusion, she felt dizzy and tired afterwards.  She is now down to prednisone 20 mg daily.      REVIEW OF SYSTEMS:   14 point ROS was reviewed and is negative other than as noted above in HPI.       HOME MEDICATIONS:  Current Outpatient Medications   Medication Sig Dispense Refill     cyanocobalamin (VITAMIN  B-12) 1000 MCG tablet   3     Flaxseed, Linseed, (FLAX SEEDS PO) Take by mouth daily       folic acid (FOLVITE) 1 MG tablet   3     ketoconazole (NIZORAL) 2 % external shampoo Use every other day to scalp as needed 120 mL 11     levothyroxine (SYNTHROID/LEVOTHROID) 150 MCG tablet Take 1 tablet (150 mcg) by mouth daily 90 tablet 3     predniSONE (DELTASONE) 10 MG tablet Take 3 tablets (30 mg) by mouth daily or per MD taper directions (Patient taking differently: 20 mg Take 3 tablets (30 mg) by mouth daily or per MD taper directions) 100 tablet 0     sulfamethoxazole-trimethoprim (BACTRIM DS) 800-160 MG tablet Take 1 pill orally on Mon, Wed and Friday 45 tablet 3     UNABLE TO FIND privigen inj every two months   "     hydrochlorothiazide (HYDRODIURIL) 12.5 MG tablet Take 1 tablet (12.5 mg) by mouth daily as needed (edema) (Patient not taking: Reported on 12/29/2021) 30 tablet 5     hydrocortisone butyrate (LOCOID) 0.1 % SOLN solution Apply to scalp bid prn (Patient not taking: Reported on 12/21/2021) 60 mL 11     triamcinolone (KENALOG) 0.1 % external cream  (Patient not taking: Reported on 12/21/2021)           ALLERGIES:  Allergies   Allergen Reactions     Doxycycline          PAST MEDICAL HISTORY:  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, and posterior vitreous detachment     Urinary tract infection, site not specified     Recurrent UTI's         PAST SURGICAL HISTORY:  Past Surgical History:   Procedure Laterality Date     COLONOSCOPY N/A 4/26/2016    Procedure: COLONOSCOPY;  Surgeon: Dontae Del Rio MD;  Location:  GI     ENT SURGERY  1985    Thyroid lobectomy     EYE SURGERY Bilateral 04/20/2021    Cataract Surgery      CYSTOSCOPY,INSERT URETERAL STENT      Ureteral stent insertion     HC FLEX SIGMOIDOSCOPY W BIOPSY  5/30/00      LAPAROSCOPY, SURGICAL; CHOLECYSTECTOMY  1992      THYROIDECTOMY       LIGATION OF HEMORRHOID(S)      Hemorrhoidectomy     UPPER GI ENDOSCOPY,BIOPSY  10/01/01     [facility] LIGATE FALLOPIAN TUBE       [facility] COLONOSCOPY W BIOPSY  4/26/00     [facility] COLONOSCOPY W BIOPSY  10/8/03     [facility]  6/27/05    REPEAT IN 5 YEARS          SOCIAL HISTORY:  Social History     Socioeconomic History     Marital status:      Spouse name: Not on file     Number of children: Not on file     Years of education: Not on file     Highest education level: Not on file   Occupational History     Not on file   Tobacco Use     Smoking status: Never Smoker     Smokeless tobacco: Never Used   Substance and Sexual Activity     Alcohol use: Yes     Alcohol/week: 0.0 standard drinks     Comment: 1 a day at most     Drug use: No     Sexual activity: Not Currently     Partners: Male   Other Topics Concern     Parent/sibling w/ CABG, MI or angioplasty before 65F 55M? No   Social History Narrative     Not on file     Social Determinants of Health     Financial Resource Strain: Not on file   Food Insecurity: Not on file   Transportation Needs: Not on file   Physical Activity: Not on file   Stress: Not on file   Social Connections: Not on file   Intimate Partner Violence: Not At Risk     Fear of Current or Ex-Partner: No     Emotionally Abused: No     Physically Abused: No     Sexually Abused: No   Housing Stability: Not on file         FAMILY HISTORY:  Family History   Problem Relation Age of Onset     Colon Cancer Mother 90     Cerebrovascular Disease Father          at age 85     Dementia Brother      Prostate Cancer Brother      Thyroid Disease Brother      Dementia Brother      Thyroid Disease Brother      Pancreatic Cancer Brother      Breast Cancer Sister      Hyperlipidemia Sister      Depression Sister      Anxiety Disorder Sister      Thyroid Disease Sister      Breast Cancer Sister      Colon Cancer Niece      Other Cancer Niece         leukemia         PHYSICAL EXAM:  BP (!) 157/67   Pulse 72   Temp 97.6  F (36.4  C) (Tympanic)   Resp 16   Wt 72.1 kg (159 lb)   LMP  (LMP Unknown)   SpO2 97%   BMI 21.56 kg/m    ECO  GENERAL/CONSTITUTIONAL: No acute distress.  EYES: No scleral icterus.  NEUROLOGIC: Alert, oriented, answers questions  appropriately.  INTEGUMENTARY: No jaundice.        LABS:  CBC RESULTS: Recent Labs   Lab Test 01/14/22  1039   WBC 5.5   RBC 4.13   HGB 10.3*   HCT 34.3*   MCV 83   MCH 24.9*   MCHC 30.0*   RDW 14.2        Recent Labs   Lab Test 01/14/22  1039 01/06/22  1037    138   POTASSIUM 3.6 4.1   CHLORIDE 108 107   CO2 25 26   ANIONGAP 6 5   GLC 93 105*   BUN 16 13   CR 0.63 0.72   CULLEN 8.8 8.5     Lab Results   Component Value Date    AST 18 01/14/2022    AST 30 06/29/2021     Lab Results   Component Value Date    ALT 26 01/14/2022    ALT 36 06/29/2021     No results found for: BILICONJ   Lab Results   Component Value Date    BILITOTAL 0.8 01/14/2022    BILITOTAL 0.8 06/29/2021     Lab Results   Component Value Date    ALBUMIN 3.9 01/14/2022    ALBUMIN 3.9 06/29/2021     Lab Results   Component Value Date    PROTTOTAL 6.4 01/14/2022    PROTTOTAL 6.4 06/29/2021      Lab Results   Component Value Date    ALKPHOS 89 01/14/2022    ALKPHOS 97 06/29/2021     Component      Latest Ref Rng & Units 1/14/2022   IGG      610-1,616 mg/dL 462 (L)   IGA      84 - 499 mg/dL <2 (L)   IGM      35 - 242 mg/dL <10 (L)   % Retic      0.5 - 2.0 % 1.9   Absolute Retic      0.025 - 0.095 10e6/uL 0.079   Haptoglobin      32 - 197 mg/dL <3 (L)   Lactate Dehydrogenase      81 - 234 U/L 214         ASSESSMENT/PLAN:  Tricia Sosa is a 81 year old female with:      1. 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 Common variable immunodeficiency disorder  4. Splenomegaly        1) Autoimmune hemolytic anemia: She had relapse in July 2020, and started on prednisone, as well as weekly rituximab infusions x 4, completed on 8/21/20.  Her hemoglobin slowly improved back up to her baseline and has been stable.  She tapered off of prednisone as of early October 2020.  She had another relapse, and stated on prednisone again on 6/8/21.  She has had good  response in hemoglobin back to her baseline with prednisone alone, and has finished taper in August 2021.    We also previously talked about other treatment options if her disease becomes refractory to steroids, such as splenectomy.      Her hemoglobin dropped again, and she restarted prednisone at 70 mg daily on 11/2/21.   Her hemoglobin has improved back up to 10.3.      She does not want to consider splenectomy yet.  She does still seem to be responding to steroids.  However, she relapsed again more quickly this time.  She is willing to take rituximab again to see if she can go longer between recurrent episodes, which she completed another 4 doses on 12/29/21.    -she is now on prednisone 20 mg daily, she will decrease by 5 mg every other week until off.  -hemoglobin now stable  -monitor CBC every other week for now  -she is on Bactrim for prophylaxis when she is on prednisone  -RTC with me with her next IVIG infusion; due to transportation issues, she would like to minimize number of visits if possible    2) CVID:   -continue IVIG.  She gets it, if IgG is <600.  It's been about every other month.  She will get a dose today.  -IgG check and IVIG every 8 weeks if meets parameters  -she says that she had a infectious disease physician when she was at [facility] and would like a referral here.  Referral was made, but she still has not heard from them regarding appointment.  -she did see immunology, but had to go to [place], and does not want to go there      Sabina Boyd MD  Hematology/Oncology  [facility]        Total time spent on day of visit, including review of tests, obtaining/reviewing separately obtained history, ordering medications/tests/procedures, communicating with PCP/consultants, and documenting in electronic medical record: 30 minutes

## 2022-01-18 NOTE — PROGRESS NOTES
Oncology Rooming Note    January 18, 2022 9:16 AM   Tricia Sosa is a 81 year old female who presents for:    Chief Complaint   Patient presents with     Oncology Clinic Visit     Autoimmune hemolytic anemia      Initial Vitals: BP (!) 157/67   Pulse 72   Temp 97.6  F (36.4  C) (Tympanic)   Resp 16   Wt 72.1 kg (159 lb)   LMP  (LMP Unknown)   SpO2 97%   BMI 21.56 kg/m   Estimated body mass index is 21.56 kg/m  as calculated from the following:    Height as of 1/3/22: 1.829 m (6').    Weight as of this encounter: 72.1 kg (159 lb). Body surface area is 1.91 meters squared.  No Pain (0) Comment: Data Unavailable   No LMP recorded (lmp unknown). Patient is postmenopausal.  Allergies reviewed: Yes  Medications reviewed: Yes    Medications: Medication refills not needed today.  Pharmacy name entered into [facility]:    [place], [place] - [address] [facility] [id number] [place], [place] - [address]      Tricia Marshall CMA

## 2022-01-19 NOTE — PATIENT INSTRUCTIONS
Labs scheduled 2/1, 2/15, 3/1, 3/15  Appt with Dr. Boyd and IVIG scheduled 3/15  Anabell Hawkins, RN, BSN, COLLEEN  RN Care Coordinator  [facility]  [phone]

## 2022-02-01 ENCOUNTER — LAB (OUTPATIENT)
Dept: ONCOLOGY | Facility: CLINIC | Age: 82
End: 2022-02-01
Attending: INTERNAL MEDICINE
Payer: MEDICARE

## 2022-02-01 DIAGNOSIS — D59.19 OTHER AUTOIMMUNE HEMOLYTIC ANEMIA (H): ICD-10-CM

## 2022-02-01 LAB
BASOPHILS # BLD AUTO: 0 10E3/UL (ref 0–0.2)
BASOPHILS NFR BLD AUTO: 0 %
EOSINOPHIL # BLD AUTO: 0 10E3/UL (ref 0–0.7)
EOSINOPHIL NFR BLD AUTO: 0 %
ERYTHROCYTE [DISTWIDTH] IN BLOOD BY AUTOMATED COUNT: 15.5 % (ref 10–15)
HCT VFR BLD AUTO: 34.4 % (ref 35–47)
HGB BLD-MCNC: 10 G/DL (ref 11.7–15.7)
IMM GRANULOCYTES # BLD: 0 10E3/UL
IMM GRANULOCYTES NFR BLD: 0 %
LYMPHOCYTES # BLD AUTO: 0.7 10E3/UL (ref 0.8–5.3)
LYMPHOCYTES NFR BLD AUTO: 12 %
MCH RBC QN AUTO: 24.8 PG (ref 26.5–33)
MCHC RBC AUTO-ENTMCNC: 29.1 G/DL (ref 31.5–36.5)
MCV RBC AUTO: 85 FL (ref 78–100)
MONOCYTES # BLD AUTO: 0.3 10E3/UL (ref 0–1.3)
MONOCYTES NFR BLD AUTO: 5 %
NEUTROPHILS # BLD AUTO: 4.9 10E3/UL (ref 1.6–8.3)
NEUTROPHILS NFR BLD AUTO: 83 %
NRBC # BLD AUTO: 0 10E3/UL
NRBC BLD AUTO-RTO: 0 /100
PLATELET # BLD AUTO: 172 10E3/UL (ref 150–450)
RBC # BLD AUTO: 4.03 10E6/UL (ref 3.8–5.2)
WBC # BLD AUTO: 5.9 10E3/UL (ref 4–11)

## 2022-02-01 PROCEDURE — 85025 COMPLETE CBC W/AUTO DIFF WBC: CPT | Performed by: INTERNAL MEDICINE

## 2022-02-01 PROCEDURE — 36415 COLL VENOUS BLD VENIPUNCTURE: CPT

## 2022-02-15 ENCOUNTER — LAB (OUTPATIENT)
Dept: ONCOLOGY | Facility: CLINIC | Age: 82
End: 2022-02-15
Attending: INTERNAL MEDICINE
Payer: MEDICARE

## 2022-02-15 DIAGNOSIS — D59.19 OTHER AUTOIMMUNE HEMOLYTIC ANEMIA (H): ICD-10-CM

## 2022-02-15 LAB
ALBUMIN SERPL-MCNC: 3.9 G/DL (ref 3.4–5)
ALP SERPL-CCNC: 92 U/L (ref 40–150)
ALT SERPL W P-5'-P-CCNC: 22 U/L (ref 0–50)
ANION GAP SERPL CALCULATED.3IONS-SCNC: 7 MMOL/L (ref 3–14)
AST SERPL W P-5'-P-CCNC: 25 U/L (ref 0–45)
BASOPHILS # BLD AUTO: 0 10E3/UL (ref 0–0.2)
BASOPHILS NFR BLD AUTO: 0 %
BILIRUB SERPL-MCNC: 0.5 MG/DL (ref 0.2–1.3)
BUN SERPL-MCNC: 15 MG/DL (ref 7–30)
CALCIUM SERPL-MCNC: 8.9 MG/DL (ref 8.5–10.1)
CHLORIDE BLD-SCNC: 109 MMOL/L (ref 94–109)
CO2 SERPL-SCNC: 25 MMOL/L (ref 20–32)
CREAT SERPL-MCNC: 0.64 MG/DL (ref 0.52–1.04)
EOSINOPHIL # BLD AUTO: 0 10E3/UL (ref 0–0.7)
EOSINOPHIL NFR BLD AUTO: 0 %
ERYTHROCYTE [DISTWIDTH] IN BLOOD BY AUTOMATED COUNT: 15.9 % (ref 10–15)
GFR SERPL CREATININE-BSD FRML MDRD: 88 ML/MIN/1.73M2
GLUCOSE BLD-MCNC: 110 MG/DL (ref 70–99)
HCT VFR BLD AUTO: 32.6 % (ref 35–47)
HGB BLD-MCNC: 9.7 G/DL (ref 11.7–15.7)
IMM GRANULOCYTES # BLD: 0 10E3/UL
IMM GRANULOCYTES NFR BLD: 0 %
LDH SERPL L TO P-CCNC: 285 U/L (ref 81–234)
LYMPHOCYTES # BLD AUTO: 0.8 10E3/UL (ref 0.8–5.3)
LYMPHOCYTES NFR BLD AUTO: 12 %
MCH RBC QN AUTO: 24.6 PG (ref 26.5–33)
MCHC RBC AUTO-ENTMCNC: 29.8 G/DL (ref 31.5–36.5)
MCV RBC AUTO: 83 FL (ref 78–100)
MONOCYTES # BLD AUTO: 0.3 10E3/UL (ref 0–1.3)
MONOCYTES NFR BLD AUTO: 4 %
NEUTROPHILS # BLD AUTO: 5.8 10E3/UL (ref 1.6–8.3)
NEUTROPHILS NFR BLD AUTO: 84 %
NRBC # BLD AUTO: 0 10E3/UL
NRBC BLD AUTO-RTO: 0 /100
PLATELET # BLD AUTO: 181 10E3/UL (ref 150–450)
POTASSIUM BLD-SCNC: 4.3 MMOL/L (ref 3.4–5.3)
PROT SERPL-MCNC: 6.6 G/DL (ref 6.8–8.8)
RBC # BLD AUTO: 3.94 10E6/UL (ref 3.8–5.2)
RETICS # AUTO: 0.07 10E6/UL (ref 0.03–0.1)
RETICS/RBC NFR AUTO: 1.7 % (ref 0.5–2)
SODIUM SERPL-SCNC: 141 MMOL/L (ref 133–144)
WBC # BLD AUTO: 6.9 10E3/UL (ref 4–11)

## 2022-02-15 PROCEDURE — 36415 COLL VENOUS BLD VENIPUNCTURE: CPT

## 2022-02-15 PROCEDURE — 83615 LACTATE (LD) (LDH) ENZYME: CPT | Performed by: INTERNAL MEDICINE

## 2022-02-15 PROCEDURE — 83010 ASSAY OF HAPTOGLOBIN QUANT: CPT | Performed by: INTERNAL MEDICINE

## 2022-02-15 PROCEDURE — 85045 AUTOMATED RETICULOCYTE COUNT: CPT | Performed by: INTERNAL MEDICINE

## 2022-02-15 PROCEDURE — 85025 COMPLETE CBC W/AUTO DIFF WBC: CPT | Performed by: INTERNAL MEDICINE

## 2022-02-15 PROCEDURE — 82784 ASSAY IGA/IGD/IGG/IGM EACH: CPT | Performed by: INTERNAL MEDICINE

## 2022-02-15 PROCEDURE — 80053 COMPREHEN METABOLIC PANEL: CPT | Performed by: INTERNAL MEDICINE

## 2022-02-15 NOTE — PROGRESS NOTES
Medical Assistant Note:  Tricia Sosa presents today for labs.    Patient seen by provider today: No.   present during visit today: Not Applicable.    Concerns: No Concerns.    Procedure:  Labs drawn: .    Post Assessment:  Labs drawn without difficulty: Yes.    Discharge Plan:  Departure Mode: Ambulatory.    Face to Face Time: 10 minutes.    Montserrat Hutchins, CMA

## 2022-02-16 LAB
HAPTOGLOB SERPL-MCNC: 21 MG/DL (ref 32–197)
IGA SERPL-MCNC: <2 MG/DL (ref 84–499)
IGG SERPL-MCNC: 487 MG/DL (ref 610–1616)
IGM SERPL-MCNC: <10 MG/DL (ref 35–242)

## 2022-02-17 PROBLEM — D59.19 OTHER AUTOIMMUNE HEMOLYTIC ANEMIA (H): Status: ACTIVE | Noted: 2017-09-21

## 2022-02-17 PROBLEM — D59.19 OTHER AUTOIMMUNE HEMOLYTIC ANEMIA (H): Status: RESOLVED | Noted: 2020-10-21 | Resolved: 2021-03-30

## 2022-02-24 ENCOUNTER — PATIENT OUTREACH (OUTPATIENT)
Dept: ONCOLOGY | Facility: CLINIC | Age: 82
End: 2022-02-24
Payer: MEDICARE

## 2022-02-24 NOTE — PROGRESS NOTES
Per Dr. Boyd, ok to continue the prednisone taper. Per Dr. Boyd's note from 1/18/22 pt will decrease by 5 mg every other week until off.     Tricia was instructed to decrease to 5 mg today and then she will stop prednisone on 3/10. Writer instructed Tricia to call if she has any side effects after discontinuing her prednisone. She reports she feels the same right now but will keep us updated until her appt with Dr. Boyd 3/15.    Tricia verbalized understanding and is in agreement with the plan.     Anabell Hawkins, RN, BSN, COLLEEN  RN Care Coordinator  [facility]  [phone]

## 2022-02-24 NOTE — PROGRESS NOTES
Tricia called in to clinic asking if she should continue her prednisone taper or if she should pause the taper because she noticed her Hgb has dropped slightly.     Tricia decreased to prednisone 10 mg daily on 2/9 and would be due to decrease again today.     Results for TRICIA MARRUFO (MRN [id number]) as of 2/24/2022 12:49   Ref. Range 1/14/2022 10:39 2/1/2022 14:39 2/15/2022 14:49   Hemoglobin Latest Ref Range: 11.7 - 15.7 g/dL 10.3 (L) 10.0 (L) 9.7 (L)     Writer will discuss with Dr. Boyd and call Tricia back at [phone].    Anabell Hawkins, RN, BSN, COLLEEN  RN Care Coordinator  [facility]  [phone]

## 2022-02-28 ENCOUNTER — PATIENT OUTREACH (OUTPATIENT)
Dept: ONCOLOGY | Facility: CLINIC | Age: 82
End: 2022-02-28
Payer: MEDICARE

## 2022-02-28 NOTE — PROGRESS NOTES
Tricia called in to clinic reporting she saw her Immunologist in [place], [place] (Dr. Rutherford) and he is wondering if we could send the records from [place] to their clinic. Tricia was unsure what their clinic name is.    Writer explained we will need a signed STEPH from Tricia in order to send Dr. Boyd's office visit notes.     Writer will send a My chart message with the STEPH form. Tricia agreed to bring the completed form to clinic tomorrow when she comes in for her lab appointment.     Tricia's Immunologist  Is Dr. Rutherford in [place], [place]  Phone: [phone]  Fax: [phone]      Anabell Hawkins, RN, BSN, COLLEEN  RN Care Coordinator  [facility]  [phone]

## 2022-03-01 ENCOUNTER — LAB (OUTPATIENT)
Dept: ONCOLOGY | Facility: CLINIC | Age: 82
End: 2022-03-01
Attending: INTERNAL MEDICINE
Payer: MEDICARE

## 2022-03-01 DIAGNOSIS — D59.19 OTHER AUTOIMMUNE HEMOLYTIC ANEMIA (H): ICD-10-CM

## 2022-03-01 LAB
BASOPHILS # BLD AUTO: 0 10E3/UL (ref 0–0.2)
BASOPHILS NFR BLD AUTO: 0 %
EOSINOPHIL # BLD AUTO: 0 10E3/UL (ref 0–0.7)
EOSINOPHIL NFR BLD AUTO: 0 %
ERYTHROCYTE [DISTWIDTH] IN BLOOD BY AUTOMATED COUNT: 16.7 % (ref 10–15)
HCT VFR BLD AUTO: 31.8 % (ref 35–47)
HGB BLD-MCNC: 9.5 G/DL (ref 11.7–15.7)
IMM GRANULOCYTES # BLD: 0 10E3/UL
IMM GRANULOCYTES NFR BLD: 0 %
LYMPHOCYTES # BLD AUTO: 1.8 10E3/UL (ref 0.8–5.3)
LYMPHOCYTES NFR BLD AUTO: 36 %
MCH RBC QN AUTO: 24.9 PG (ref 26.5–33)
MCHC RBC AUTO-ENTMCNC: 29.9 G/DL (ref 31.5–36.5)
MCV RBC AUTO: 84 FL (ref 78–100)
MONOCYTES # BLD AUTO: 0.5 10E3/UL (ref 0–1.3)
MONOCYTES NFR BLD AUTO: 11 %
NEUTROPHILS # BLD AUTO: 2.6 10E3/UL (ref 1.6–8.3)
NEUTROPHILS NFR BLD AUTO: 53 %
NRBC # BLD AUTO: 0 10E3/UL
NRBC BLD AUTO-RTO: 0 /100
PLATELET # BLD AUTO: 181 10E3/UL (ref 150–450)
RBC # BLD AUTO: 3.81 10E6/UL (ref 3.8–5.2)
WBC # BLD AUTO: 5 10E3/UL (ref 4–11)

## 2022-03-01 PROCEDURE — 36415 COLL VENOUS BLD VENIPUNCTURE: CPT

## 2022-03-01 PROCEDURE — 85004 AUTOMATED DIFF WBC COUNT: CPT | Performed by: INTERNAL MEDICINE

## 2022-03-01 NOTE — PROGRESS NOTES
Writer received the pt's signed STEPH to release records to Dr. Rutherford's clinic. Writer faxed the office visit notes from Jan 18th, 2022 visit with Dr. Boyd to Dr. Rutherford at fax # [phone].     Copy of STEPH sent to scanning.     Writer called Tricia and left a voicemail, updating her that records have been sent.     Anabell Hawkins, RN, BSN, MAOM  RN Care Coordinator  [facility]  [phone]

## 2022-03-11 DIAGNOSIS — D83.9 CVID (COMMON VARIABLE IMMUNODEFICIENCY) (H): ICD-10-CM

## 2022-03-11 DIAGNOSIS — D59.19 OTHER AUTOIMMUNE HEMOLYTIC ANEMIA (H): Primary | ICD-10-CM

## 2022-03-11 DIAGNOSIS — D59.10 AUTOIMMUNE HEMOLYTIC ANEMIA (H): ICD-10-CM

## 2022-03-11 RX ORDER — HEPARIN SODIUM,PORCINE 10 UNIT/ML
5 VIAL (ML) INTRAVENOUS
Status: CANCELLED | OUTPATIENT
Start: 2022-03-15

## 2022-03-11 RX ORDER — EPINEPHRINE 1 MG/ML
0.3 INJECTION, SOLUTION INTRAMUSCULAR; SUBCUTANEOUS EVERY 5 MIN PRN
Status: CANCELLED | OUTPATIENT
Start: 2022-03-15

## 2022-03-11 RX ORDER — DIPHENHYDRAMINE HCL 25 MG
50 CAPSULE ORAL ONCE
Status: CANCELLED
Start: 2022-03-15

## 2022-03-11 RX ORDER — ACETAMINOPHEN 325 MG/1
650 TABLET ORAL ONCE
Status: CANCELLED
Start: 2022-03-15

## 2022-03-11 RX ORDER — MEPERIDINE HYDROCHLORIDE 25 MG/ML
25 INJECTION INTRAMUSCULAR; INTRAVENOUS; SUBCUTANEOUS EVERY 30 MIN PRN
Status: CANCELLED | OUTPATIENT
Start: 2022-03-15

## 2022-03-11 RX ORDER — ALBUTEROL SULFATE 0.83 MG/ML
2.5 SOLUTION RESPIRATORY (INHALATION)
Status: CANCELLED | OUTPATIENT
Start: 2022-03-15

## 2022-03-11 RX ORDER — DIPHENHYDRAMINE HYDROCHLORIDE 50 MG/ML
50 INJECTION INTRAMUSCULAR; INTRAVENOUS
Status: CANCELLED
Start: 2022-03-15

## 2022-03-11 RX ORDER — ALBUTEROL SULFATE 90 UG/1
1-2 AEROSOL, METERED RESPIRATORY (INHALATION)
Status: CANCELLED
Start: 2022-03-15

## 2022-03-11 RX ORDER — HEPARIN SODIUM (PORCINE) LOCK FLUSH IV SOLN 100 UNIT/ML 100 UNIT/ML
5 SOLUTION INTRAVENOUS
Status: CANCELLED | OUTPATIENT
Start: 2022-03-15

## 2022-03-11 RX ORDER — NALOXONE HYDROCHLORIDE 0.4 MG/ML
0.2 INJECTION, SOLUTION INTRAMUSCULAR; INTRAVENOUS; SUBCUTANEOUS
Status: CANCELLED | OUTPATIENT
Start: 2022-03-15

## 2022-03-11 RX ORDER — METHYLPREDNISOLONE SODIUM SUCCINATE 125 MG/2ML
125 INJECTION, POWDER, LYOPHILIZED, FOR SOLUTION INTRAMUSCULAR; INTRAVENOUS
Status: CANCELLED
Start: 2022-03-15

## 2022-03-15 ENCOUNTER — LAB (OUTPATIENT)
Dept: INFUSION THERAPY | Facility: CLINIC | Age: 82
End: 2022-03-15
Attending: INTERNAL MEDICINE
Payer: MEDICARE

## 2022-03-15 ENCOUNTER — ONCOLOGY VISIT (OUTPATIENT)
Dept: ONCOLOGY | Facility: CLINIC | Age: 82
End: 2022-03-15
Attending: INTERNAL MEDICINE
Payer: MEDICARE

## 2022-03-15 VITALS
TEMPERATURE: 97.3 F | SYSTOLIC BLOOD PRESSURE: 135 MMHG | HEART RATE: 74 BPM | HEIGHT: 72 IN | OXYGEN SATURATION: 97 % | BODY MASS INDEX: 22.08 KG/M2 | DIASTOLIC BLOOD PRESSURE: 69 MMHG | WEIGHT: 163 LBS | RESPIRATION RATE: 16 BRPM

## 2022-03-15 DIAGNOSIS — D59.19 OTHER AUTOIMMUNE HEMOLYTIC ANEMIA (H): ICD-10-CM

## 2022-03-15 DIAGNOSIS — D59.19 OTHER AUTOIMMUNE HEMOLYTIC ANEMIA (H): Primary | ICD-10-CM

## 2022-03-15 DIAGNOSIS — D59.10 AUTOIMMUNE HEMOLYTIC ANEMIA (H): ICD-10-CM

## 2022-03-15 DIAGNOSIS — D83.9 CVID (COMMON VARIABLE IMMUNODEFICIENCY) (H): Primary | ICD-10-CM

## 2022-03-15 LAB
ALBUMIN SERPL-MCNC: 3.5 G/DL (ref 3.4–5)
ALP SERPL-CCNC: 96 U/L (ref 40–150)
ALT SERPL W P-5'-P-CCNC: 21 U/L (ref 0–50)
ANION GAP SERPL CALCULATED.3IONS-SCNC: 5 MMOL/L (ref 3–14)
AST SERPL W P-5'-P-CCNC: 25 U/L (ref 0–45)
BASOPHILS # BLD AUTO: 0 10E3/UL (ref 0–0.2)
BASOPHILS NFR BLD AUTO: 0 %
BILIRUB SERPL-MCNC: 0.6 MG/DL (ref 0.2–1.3)
BUN SERPL-MCNC: 13 MG/DL (ref 7–30)
CALCIUM SERPL-MCNC: 8.6 MG/DL (ref 8.5–10.1)
CHLORIDE BLD-SCNC: 108 MMOL/L (ref 94–109)
CO2 SERPL-SCNC: 26 MMOL/L (ref 20–32)
CREAT SERPL-MCNC: 0.8 MG/DL (ref 0.52–1.04)
EOSINOPHIL # BLD AUTO: 0 10E3/UL (ref 0–0.7)
EOSINOPHIL NFR BLD AUTO: 1 %
ERYTHROCYTE [DISTWIDTH] IN BLOOD BY AUTOMATED COUNT: 16.7 % (ref 10–15)
GFR SERPL CREATININE-BSD FRML MDRD: 74 ML/MIN/1.73M2
GLUCOSE BLD-MCNC: 119 MG/DL (ref 70–99)
HCT VFR BLD AUTO: 29.7 % (ref 35–47)
HGB BLD-MCNC: 9.1 G/DL (ref 11.7–15.7)
IMM GRANULOCYTES # BLD: 0 10E3/UL
IMM GRANULOCYTES NFR BLD: 0 %
LDH SERPL L TO P-CCNC: 234 U/L (ref 81–234)
LYMPHOCYTES # BLD AUTO: 1.6 10E3/UL (ref 0.8–5.3)
LYMPHOCYTES NFR BLD AUTO: 37 %
MCH RBC QN AUTO: 25.9 PG (ref 26.5–33)
MCHC RBC AUTO-ENTMCNC: 30.6 G/DL (ref 31.5–36.5)
MCV RBC AUTO: 84 FL (ref 78–100)
MONOCYTES # BLD AUTO: 0.5 10E3/UL (ref 0–1.3)
MONOCYTES NFR BLD AUTO: 13 %
NEUTROPHILS # BLD AUTO: 2.1 10E3/UL (ref 1.6–8.3)
NEUTROPHILS NFR BLD AUTO: 49 %
NRBC # BLD AUTO: 0 10E3/UL
NRBC BLD AUTO-RTO: 0 /100
PLATELET # BLD AUTO: 145 10E3/UL (ref 150–450)
POTASSIUM BLD-SCNC: 4 MMOL/L (ref 3.4–5.3)
PROT SERPL-MCNC: 5.9 G/DL (ref 6.8–8.8)
RBC # BLD AUTO: 3.52 10E6/UL (ref 3.8–5.2)
RETICS # AUTO: 0.07 10E6/UL (ref 0.03–0.1)
RETICS/RBC NFR AUTO: 1.9 % (ref 0.5–2)
SODIUM SERPL-SCNC: 139 MMOL/L (ref 133–144)
WBC # BLD AUTO: 4.3 10E3/UL (ref 4–11)

## 2022-03-15 PROCEDURE — G0463 HOSPITAL OUTPT CLINIC VISIT: HCPCS | Mod: 25

## 2022-03-15 PROCEDURE — 96375 TX/PRO/DX INJ NEW DRUG ADDON: CPT

## 2022-03-15 PROCEDURE — 250N000011 HC RX IP 250 OP 636: Performed by: INTERNAL MEDICINE

## 2022-03-15 PROCEDURE — 96365 THER/PROPH/DIAG IV INF INIT: CPT

## 2022-03-15 PROCEDURE — 82784 ASSAY IGA/IGD/IGG/IGM EACH: CPT | Performed by: INTERNAL MEDICINE

## 2022-03-15 PROCEDURE — 36415 COLL VENOUS BLD VENIPUNCTURE: CPT

## 2022-03-15 PROCEDURE — 85045 AUTOMATED RETICULOCYTE COUNT: CPT | Performed by: INTERNAL MEDICINE

## 2022-03-15 PROCEDURE — 80053 COMPREHEN METABOLIC PANEL: CPT | Performed by: INTERNAL MEDICINE

## 2022-03-15 PROCEDURE — 83615 LACTATE (LD) (LDH) ENZYME: CPT | Performed by: INTERNAL MEDICINE

## 2022-03-15 PROCEDURE — 85025 COMPLETE CBC W/AUTO DIFF WBC: CPT | Performed by: INTERNAL MEDICINE

## 2022-03-15 PROCEDURE — 83010 ASSAY OF HAPTOGLOBIN QUANT: CPT | Performed by: INTERNAL MEDICINE

## 2022-03-15 PROCEDURE — 99214 OFFICE O/P EST MOD 30 MIN: CPT | Performed by: INTERNAL MEDICINE

## 2022-03-15 PROCEDURE — 96366 THER/PROPH/DIAG IV INF ADDON: CPT

## 2022-03-15 RX ORDER — DIPHENHYDRAMINE HYDROCHLORIDE 50 MG/ML
50 INJECTION INTRAMUSCULAR; INTRAVENOUS
Status: CANCELLED
Start: 2022-05-10

## 2022-03-15 RX ORDER — HEPARIN SODIUM,PORCINE 10 UNIT/ML
5 VIAL (ML) INTRAVENOUS
Status: CANCELLED | OUTPATIENT
Start: 2022-05-10

## 2022-03-15 RX ORDER — EPINEPHRINE 1 MG/ML
0.3 INJECTION, SOLUTION INTRAMUSCULAR; SUBCUTANEOUS EVERY 5 MIN PRN
Status: CANCELLED | OUTPATIENT
Start: 2022-05-10

## 2022-03-15 RX ORDER — MEPERIDINE HYDROCHLORIDE 25 MG/ML
25 INJECTION INTRAMUSCULAR; INTRAVENOUS; SUBCUTANEOUS EVERY 30 MIN PRN
Status: CANCELLED | OUTPATIENT
Start: 2022-05-10

## 2022-03-15 RX ORDER — ALBUTEROL SULFATE 0.83 MG/ML
2.5 SOLUTION RESPIRATORY (INHALATION)
Status: CANCELLED | OUTPATIENT
Start: 2022-05-10

## 2022-03-15 RX ORDER — HEPARIN SODIUM (PORCINE) LOCK FLUSH IV SOLN 100 UNIT/ML 100 UNIT/ML
5 SOLUTION INTRAVENOUS
Status: CANCELLED | OUTPATIENT
Start: 2022-05-10

## 2022-03-15 RX ORDER — DIPHENHYDRAMINE HCL 25 MG
50 CAPSULE ORAL ONCE
Status: CANCELLED
Start: 2022-05-10

## 2022-03-15 RX ORDER — NALOXONE HYDROCHLORIDE 0.4 MG/ML
0.2 INJECTION, SOLUTION INTRAMUSCULAR; INTRAVENOUS; SUBCUTANEOUS
Status: CANCELLED | OUTPATIENT
Start: 2022-05-10

## 2022-03-15 RX ORDER — METHYLPREDNISOLONE SODIUM SUCCINATE 125 MG/2ML
125 INJECTION, POWDER, LYOPHILIZED, FOR SOLUTION INTRAMUSCULAR; INTRAVENOUS
Status: CANCELLED
Start: 2022-05-10

## 2022-03-15 RX ORDER — ACETAMINOPHEN 325 MG/1
650 TABLET ORAL ONCE
Status: CANCELLED
Start: 2022-05-10

## 2022-03-15 RX ORDER — ALBUTEROL SULFATE 90 UG/1
1-2 AEROSOL, METERED RESPIRATORY (INHALATION)
Status: CANCELLED
Start: 2022-05-10

## 2022-03-15 RX ADMIN — HYDROCORTISONE SODIUM SUCCINATE 100 MG: 100 INJECTION, POWDER, FOR SOLUTION INTRAMUSCULAR; INTRAVENOUS at 11:05

## 2022-03-15 RX ADMIN — HUMAN IMMUNOGLOBULIN G 40 G: 40 LIQUID INTRAVENOUS at 11:10

## 2022-03-15 ASSESSMENT — PAIN SCALES - GENERAL: PAINLEVEL: NO PAIN (0)

## 2022-03-15 NOTE — PROGRESS NOTES
Nursing Note:  Tricia Sosa presents today for PIV and labs.    Patient seen by provider today: Yes: Dr. Boyd   present during visit today: Not Applicable.    Note: N/A.    Intravenous Access:  Lab draw site Left arm, Needle type IV, Gauge 24.  Labs drawn without difficulty.  Peripheral IV placed.    Discharge Plan:   [facility]    Sarah Hill RN

## 2022-03-15 NOTE — LETTER
3/15/2022         RE: Tricia Sosa  [id number]  [facility] [id number]        Dear Colleague,    Thank you for referring your patient, Tricia Sosa, to the [facility]. Please see a copy of my visit note below.    [facility]    Hematology/Oncology Established Patient Follow-up Note      Today's Date: 3/15/22    Reason for Follow-up: Hemolytic anemia-autoimmune; IgA deficiency    HISTORY OF PRESENT ILLNESS: Tricia Sosa is a 81 year old female who presents with autoimmune hemolytic anemia and IgA deficiency.  She previously saw Dr. Matos and then Dr. Summers.  She was previously seen at [facility].    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  She was very sick at the time and had severe diarrhea.  She lost about 40 pounds in weight.  The workup revealed that she had markedly diminished CD4 counts of 48 and was diagnosed with CMV colitis.  Since then she has been followed in hematology clinic at [facility] until recently.  She was noted to have anemia which was fairly long-standing.  She was initially referred for anemia in 2004 and also had a bone marrow biopsy done at that time which revealed hypercellular bone marrow with 70-80% cellularity and normal trilineage hematopoiesis and no morphologic features of MDS or lymphoproliferation.  Her anemia was attributed to her chronic underlying disease.  At the next evaluation in 2002 on 4 aggressive anemia there was no evidence of hemolysis, iron deficiency, B12 or folate deficiency.  Bone marrow biopsy was not pursued.  In summer of 2015 she had progressive fatigue, dyspnea on exertion and worsening anemia with a hemoglobin now of 9.  Workup at this time did suggest a hemolytic anemia with elevated LDH at 709, undetectable haptoglobin and elevated unconjugated bilirubin at 3.3.  Monospecific MARIKA was positive for both IgG and complement.  Cold agglutinin screen  was positive with very low titer 1:64.  She was started on prednisone 60 mg daily with supplementation of iron folate and B12 starting 7/31/15.  Her prednisone has been slowly tapered and was discontinued off in January 2016.  She was last followed at [facility] on March 10, 2016 when she had stable hemoglobin.    She was followed by Dr. Matos and Dr. Summers.  Due to relapse of hemolytic anemia, she underwent another course of prednisone that has since been tapered off and rituximab x 4 weekly doses completed in 9/19/19.    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  She started weekly Rituxan on 7/31/20 x 4 doses, completed on 8/21/20.  She tapered off of prednisone in October 2020.    Due to relapse of her AIHA, she started prednisone again on 6/8/21 at 60 mg daily with slow taper and finished taper in August 2021.    Due to relapse of AIHA again, she started prednisone again on 11/2/21 at 70 mg daily with slow taper.  She also completed weekly Rituxuan again x 4 doses 12/6/21-12/29/21.      INTERIM HISTORY: Tricia is doing okay.  She feels tired.      REVIEW OF SYSTEMS:   14 point ROS was reviewed and is negative other than as noted above in HPI.       HOME MEDICATIONS:  Current Outpatient Medications   Medication Sig Dispense Refill     cyanocobalamin (VITAMIN  B-12) 1000 MCG tablet   3     Flaxseed, Linseed, (FLAX SEEDS PO) Take by mouth daily       folic acid (FOLVITE) 1 MG tablet   3     hydrochlorothiazide (HYDRODIURIL) 12.5 MG tablet Take 1 tablet (12.5 mg) by mouth daily as needed (edema) 30 tablet 5     hydrocortisone butyrate (LOCOID) 0.1 % SOLN solution Apply to scalp bid prn 60 mL 11     ketoconazole (NIZORAL) 2 % external shampoo Use every other day to scalp as needed 120 mL 11     levothyroxine (SYNTHROID/LEVOTHROID) 150 MCG tablet Take 1 tablet (150 mcg) by mouth daily 90 tablet 3     sulfamethoxazole-trimethoprim (BACTRIM DS) 800-160 MG  tablet Take 1 pill orally on Mon, Wed and Friday 45 tablet 3     triamcinolone (KENALOG) 0.1 % external cream        UNABLE TO FIND privigen inj every two months           ALLERGIES:  Allergies   Allergen Reactions     Doxycycline          PAST MEDICAL HISTORY:  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, and posterior vitreous detachment     Urinary tract infection, site not specified     Recurrent UTI's         PAST SURGICAL HISTORY:  Past Surgical History:   Procedure Laterality Date     COLONOSCOPY N/A 4/26/2016    Procedure: COLONOSCOPY;  Surgeon: Dontae Del Rio MD;  Location:  GI     ENT SURGERY  1985    Thyroid lobectomy     EYE SURGERY Bilateral 04/20/2021    Cataract Surgery     HC CYSTOSCOPY,INSERT URETERAL STENT      Ureteral stent insertion     HC FLEX SIGMOIDOSCOPY W BIOPSY  5/30/00      LAPAROSCOPY, SURGICAL; CHOLECYSTECTOMY  1992      THYROIDECTOMY       LIGATION OF HEMORRHOID(S)      Hemorrhoidectomy     UPPER GI ENDOSCOPY,BIOPSY  10/01/01     ZZC LIGATE FALLOPIAN TUBE       ZZHC COLONOSCOPY W BIOPSY  4/26/00     ZZHC COLONOSCOPY W BIOPSY  10/8/03     ZZHC COLONOSCOPY W BIOPSY  6/27/05    REPEAT IN 5 YEARS         SOCIAL HISTORY:  Social History     Socioeconomic History     Marital status:      Spouse name: Not on file     Number of children: Not on file     Years of education: Not on file     Highest education level: Not on  file   Occupational History     Not on file   Tobacco Use     Smoking status: Never Smoker     Smokeless tobacco: Never Used   Substance and Sexual Activity     Alcohol use: Yes     Alcohol/week: 0.0 standard drinks     Comment: 1 a day at most     Drug use: No     Sexual activity: Not Currently     Partners: Male   Other Topics Concern     Parent/sibling w/ CABG, MI or angioplasty before 65F 55M? No   Social History Narrative     Not on file     Social Determinants of Health     Financial Resource Strain: Not on file   Food Insecurity: Not on file   Transportation Needs: Not on file   Physical Activity: Not on file   Stress: Not on file   Social Connections: Not on file   Intimate Partner Violence: Not At Risk     Fear of Current or Ex-Partner: No     Emotionally Abused: No     Physically Abused: No     Sexually Abused: No   Housing Stability: Not on file         FAMILY HISTORY:  Family History   Problem Relation Age of Onset     Colon Cancer Mother 90     Cerebrovascular Disease Father          at age 85     Dementia Brother      Prostate Cancer Brother      Thyroid Disease Brother      Dementia Brother      Thyroid Disease Brother      Pancreatic Cancer Brother      Breast Cancer Sister      Hyperlipidemia Sister      Depression Sister      Anxiety Disorder Sister      Thyroid Disease Sister      Breast Cancer Sister      Colon Cancer Niece      Other Cancer Niece         leukemia         PHYSICAL EXAM:  /69 (Cuff Size: Adult Regular)   Pulse 74   Temp 97.3  F (36.3  C) (Tympanic)   Resp 16   Ht 1.829 m (6')   Wt 73.9 kg (163 lb)   LMP  (LMP Unknown)   SpO2 97%   BMI 22.11 kg/m    ECO  GENERAL/CONSTITUTIONAL: No acute distress.  EYES: No scleral icterus.  NEUROLOGIC: Alert, oriented, answers questions appropriately.  INTEGUMENTARY: No jaundice.        LABS:  CBC RESULTS: Recent Labs   Lab Test 03/15/22  0922   WBC 4.3   RBC 3.52*   HGB 9.1*   HCT 29.7*   MCV 84   MCH 25.9*   MCHC 30.6*   RDW  16.7*   *     Recent Labs   Lab Test 03/15/22  0922 02/15/22  1449    141   POTASSIUM 4.0 4.3   CHLORIDE 108 109   CO2 26 25   ANIONGAP 5 7   * 110*   BUN 13 15   CR 0.80 0.64   CULLEN 8.6 8.9     Lab Results   Component Value Date    AST 25 03/15/2022    AST 30 06/29/2021     Lab Results   Component Value Date    ALT 21 03/15/2022    ALT 36 06/29/2021     No results found for: BILICONJ   Lab Results   Component Value Date    BILITOTAL 0.6 03/15/2022    BILITOTAL 0.8 06/29/2021     Lab Results   Component Value Date    ALBUMIN 3.5 03/15/2022    ALBUMIN 3.9 06/29/2021     Lab Results   Component Value Date    PROTTOTAL 5.9 03/15/2022    PROTTOTAL 6.4 06/29/2021      Lab Results   Component Value Date    ALKPHOS 96 03/15/2022    ALKPHOS 97 06/29/2021     Component      Latest Ref Rng & Units 3/15/2022   % Retic      0.5 - 2.0 % 1.9   Absolute Retic      0.025 - 0.095 10e6/uL 0.066   Lactate Dehydrogenase      81 - 234 U/L 234         ASSESSMENT/PLAN:  Tricia Sosa is a 81 year old female with:      1. 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 Common variable immunodeficiency disorder  4. Splenomegaly        1) Autoimmune hemolytic anemia: She had relapse in July 2020, and started on prednisone, as well as weekly rituximab infusions x 4, completed on 8/21/20.  Her hemoglobin slowly improved back up to her baseline and has been stable.  She tapered off of prednisone as of early October 2020.  She had another relapse, and stated on prednisone again on 6/8/21.  She has had good response in hemoglobin back to her baseline with prednisone alone, and has finished taper in August 2021.    We also previously talked about other treatment options if her disease becomes refractory to steroids, such as splenectomy.      Her hemoglobin dropped again, and she restarted prednisone at 70 mg daily on 11/2/21.   Her  hemoglobin has improved back up to her baseline.    She does not want to consider splenectomy yet.  She does still seem to be responding to steroids.  However, she relapsed again more quickly this time.  She is willing to take rituximab again to see if she can go longer between recurrent episodes, which she completed another 4 doses on 12/29/21.    -she has tapered off of prednisone again as of early March 2022.    -hemoglobin trended down a bit, but remains at around her baseline.  Will hold off on restarting prednisone for now.  -monitor CBC every other week for now  -she is on Bactrim for prophylaxis when she is on prednisone  -RTC in 4 weeks with  FELIZ  -RTC with me in 8 weeks with her next IVIG infusion    2) CVID:   -continue IVIG.  She gets it, if IgG is <600.  It's been about every other month.  She will get a dose today.  -IgG check and IVIG every 8 weeks if meets parameters  -she says that she had a infectious disease physician when she was at [facility] and would like a referral here.  Referral was made, but she still has not heard from them regarding appointment.  -she did see immunology, but had to go to [place], and does not want to go there      Sabina Boyd MD  Hematology/Oncology  [facility]        Total time spent on day of visit, including review of tests, obtaining/reviewing separately obtained history, ordering medications/tests/procedures, communicating with PCP/consultants, and documenting in electronic medical record: 20 minutes        Again, thank you for allowing me to participate in the care of your patient.        Sincerely,        Sabina Boyd MD

## 2022-03-15 NOTE — PROGRESS NOTES
Infusion Nursing Note:  Tricia Sosa presents today for IVIG.    Patient seen by provider today: Yes: in person with Boyd   present during visit today: Not Applicable.    Note: Pt only receives SoluCortef as premed    IVIG initiated at rate of 0.5mg/kg/hr.  Increased rate by 0.5mg/kg/hr every 15 minutes to max rate of 4.0mg/kg/hr      Intravenous Access:  Peripheral IV placed -- infiltration X1; difficult access with veins blowing multiple times.  PIV placed without use of tourniquet     Treatment Conditions:  Biological Infusion Checklist:  ~~~ NOTE: If the patient answers yes to any of the questions below, hold the infusion and contact ordering provider or on-call provider.    1. Have you recently had an elevated temperature, fever, chills, productive cough, coughing for 3 weeks or longer or hemoptysis, abnormal vital signs, night sweats,  chest pain or have you noticed a decrease in your appetite, unexplained weight loss or fatigue? No  2. Do you have any open wounds or new incisions? No  3. Do you have any recent or upcoming hospitalizations, surgeries or dental procedures? No  4. Do you currently have or recently have had any signs of illness or infection or are you on any antibiotics? No  5. Have you had any new, sudden or worsening abdominal pain? No  6. Have you or anyone in your household received a live vaccination in the past 4 weeks? Please note:  No live vaccines while on biologic/chemotherapy until 6 months after the last treatment.  Patient can receive the flu vaccine (shot only) and the pneumovax.  It is optimal for the patient to get these vaccines mid cycle, but they can be given at any time as long as it is not on the day of the infusion. No  7. Have you recently been diagnosed with any new nervous system diseases (ie. Multiple sclerosis, Guillain [place], seizures, neurological changes) or cancer diagnosis? No  8. Are you on any form of radiation or chemotherapy? No  9. Are you  pregnant or breast feeding or do you have plans of pregnancy in the future? No  10. Have you been having any signs of worsening depression or suicidal ideations?  (benlysta only) No  11. Have there been any other new onset medical symptoms? No        Post Infusion Assessment:  Biologic Infusion Post Education: Call the triage nurse at your clinic or seek medical attention if you have chills and/or temperature greater than or equal to 100.5, uncontrolled nausea/vomiting, diarrhea, constipation, dizziness, shortness of breath, chest pain, heart palpitations, weakness or any other new or concerning symptoms, questions or concerns.  You cannot have any live virus vaccines prior to or during treatment or up to 6 months post infusion.  If you have an upcoming surgery, medical procedure or dental procedure during treatment, this should be discussed with your ordering physician and your surgeon/dentist.  If you are having any concerning symptom, if you are unsure if you should get your next infusion or wish to speak to a provider before your next infusion, please call your care coordinator or triage nurse at your clinic to notify them so we can adequately serve you.       Discharge Plan:   AVS to patient via [employer].  Patient will return in 2 weeks for labs for next appointment.   Patient discharged in stable condition accompanied by: self.  Departure Mode: Ambulatory.      Estrellita Davis RN

## 2022-03-15 NOTE — NURSING NOTE
Oncology Rooming Note    March 15, 2022 9:37 AM   Tricia Sosa is a 81 year old female who presents for:    Chief Complaint   Patient presents with     Oncology Clinic Visit     Autoimmune hemolytic anemia     Initial Vitals: /69 (Cuff Size: Adult Regular)   Pulse 74   Temp 97.3  F (36.3  C) (Tympanic)   Resp 16   Ht 1.829 m (6')   Wt 73.9 kg (163 lb)   LMP  (LMP Unknown)   SpO2 97%   BMI 22.11 kg/m   Estimated body mass index is 22.11 kg/m  as calculated from the following:    Height as of this encounter: 1.829 m (6').    Weight as of this encounter: 73.9 kg (163 lb). Body surface area is 1.94 meters squared.  Data Unavailable Comment: Data Unavailable   No LMP recorded (lmp unknown). Patient is postmenopausal.  Allergies reviewed: Yes  Medications reviewed: Yes    Medications: Medication refills not needed today.  Pharmacy name entered into [employer]:    [place], [place] - [address] [id number] [place], [place] - [address] [facility]    Clinical concerns: f/u       Corrie Hurt, MOHSEN

## 2022-03-15 NOTE — PROGRESS NOTES
[facility]    Hematology/Oncology Established Patient Follow-up Note      Today's Date: 3/15/22    Reason for Follow-up: Hemolytic anemia-autoimmune; IgA deficiency    HISTORY OF PRESENT ILLNESS: Tricia Sosa is a 81 year old female who presents with autoimmune hemolytic anemia and IgA deficiency.  She previously saw Dr. Matos and then Dr. Summers.  She was previously seen at [facility].    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  She was very sick at the time and had severe diarrhea.  She lost about 40 pounds in weight.  The workup revealed that she had markedly diminished CD4 counts of 48 and was diagnosed with CMV colitis.  Since then she has been followed in hematology clinic at [place] until recently.  She was noted to have anemia which was fairly long-standing.  She was initially referred for anemia in 2004 and also had a bone marrow biopsy done at that time which revealed hypercellular bone marrow with 70-80% cellularity and normal trilineage hematopoiesis and no morphologic features of MDS or lymphoproliferation.  Her anemia was attributed to her chronic underlying disease.  At the next evaluation in 2002 on 4 aggressive anemia there was no evidence of hemolysis, iron deficiency, B12 or folate deficiency.  Bone marrow biopsy was not pursued.  In summer of 2015 she had progressive fatigue, dyspnea on exertion and worsening anemia with a hemoglobin now of 9.  Workup at this time did suggest a hemolytic anemia with elevated LDH at 709, undetectable haptoglobin and elevated unconjugated bilirubin at 3.3.  Monospecific MARIKA was positive for both IgG and complement.  Cold agglutinin screen was positive with very low titer 1:64.  She was started on prednisone 60 mg daily with supplementation of iron folate and B12 starting 7/31/15.  Her prednisone has been slowly tapered and was discontinued off in January 2016.  She was last followed at [facility]  on March 10, 2016 when she had stable hemoglobin.    She was followed by Dr. Matos and Dr. Summers.  Due to relapse of hemolytic anemia, she underwent another course of prednisone that has since been tapered off and rituximab x 4 weekly doses completed in 9/19/19.    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  She started weekly Rituxan on 7/31/20 x 4 doses, completed on 8/21/20.  She tapered off of prednisone in October 2020.    Due to relapse of her AIHA, she started prednisone again on 6/8/21 at 60 mg daily with slow taper and finished taper in August 2021.    Due to relapse of AIHA again, she started prednisone again on 11/2/21 at 70 mg daily with slow taper.  She also completed weekly Rituxuan again x 4 doses 12/6/21-12/29/21.      INTERIM HISTORY: Tricia is doing okay.  She feels tired.      REVIEW OF SYSTEMS:   14 point ROS was reviewed and is negative other than as noted above in HPI.       HOME MEDICATIONS:  Current Outpatient Medications   Medication Sig Dispense Refill     cyanocobalamin (VITAMIN  B-12) 1000 MCG tablet   3     Flaxseed, Linseed, (FLAX SEEDS PO) Take by mouth daily       folic acid (FOLVITE) 1 MG tablet   3     hydrochlorothiazide (HYDRODIURIL) 12.5 MG tablet Take 1 tablet (12.5 mg) by mouth daily as needed (edema) 30 tablet 5     hydrocortisone butyrate (LOCOID) 0.1 % SOLN solution Apply to scalp bid prn 60 mL 11     ketoconazole (NIZORAL) 2 % external shampoo Use every other day to scalp as needed 120 mL 11     levothyroxine (SYNTHROID/LEVOTHROID) 150 MCG tablet Take 1 tablet (150 mcg) by mouth daily 90 tablet 3     sulfamethoxazole-trimethoprim (BACTRIM DS) 800-160 MG tablet Take 1 pill orally on Mon, Wed and Friday 45 tablet 3     triamcinolone (KENALOG) 0.1 % external cream        UNABLE TO FIND privigen inj every two months           ALLERGIES:  Allergies   Allergen Reactions     Doxycycline          PAST MEDICAL HISTORY:  Past  Medical History:   Diagnosis Date     WARD (dyspnea on exertion)      External hemorrhoids without mention of complication 6/27/05     Hemolytic anemia (H)     autoimmune     Hypothyroidism      IgA deficiency (H)      Myopia of both eyes with astigmatism and presbyopia 8/16/2017     Other malaise and fatigue      Other severe protein-calorie malnutrition      Other vitreous opacities 12/19/2006     Thyroid disease      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    Hemorrhage Subdural Trauma Without LOC Active     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, and posterior vitreous detachment     Urinary tract infection, site not specified     Recurrent UTI's         PAST SURGICAL HISTORY:  Past Surgical History:   Procedure Laterality Date     COLONOSCOPY N/A 4/26/2016    Procedure: COLONOSCOPY;  Surgeon: Dontae Del Rio MD;  Location:  GI     ENT SURGERY  1985    Thyroid lobectomy     EYE SURGERY Bilateral 04/20/2021    Cataract Surgery     HC CYSTOSCOPY,INSERT URETERAL STENT      Ureteral stent insertion     HC FLEX SIGMOIDOSCOPY W BIOPSY  5/30/00     HC LAPAROSCOPY, SURGICAL; CHOLECYSTECTOMY  1992      THYROIDECTOMY       LIGATION OF HEMORRHOID(S)      Hemorrhoidectomy     UPPER GI ENDOSCOPY,BIOPSY  10/01/01     ZZC LIGATE FALLOPIAN TUBE       ZZHC COLONOSCOPY W BIOPSY  4/26/00     ZZHC COLONOSCOPY W BIOPSY  10/8/03     ZZHC COLONOSCOPY W BIOPSY  6/27/05    REPEAT IN 5 YEARS         SOCIAL HISTORY:  Social History     Socioeconomic History     Marital status:      Spouse name: Not on file     Number of children: Not on file     Years of education: Not on file     Highest education level: Not on file   Occupational History     Not on file   Tobacco Use     Smoking status: Never Smoker     Smokeless tobacco: Never Used   Substance and Sexual Activity     Alcohol use: Yes     Alcohol/week: 0.0 standard drinks     Comment: 1 a day at most     Drug use: No      Sexual activity: Not Currently     Partners: Male   Other Topics Concern     Parent/sibling w/ CABG, MI or angioplasty before 65F 55M? No   Social History Narrative     Not on file     Social Determinants of Health     Financial Resource Strain: Not on file   Food Insecurity: Not on file   Transportation Needs: Not on file   Physical Activity: Not on file   Stress: Not on file   Social Connections: Not on file   Intimate Partner Violence: Not At Risk     Fear of Current or Ex-Partner: No     Emotionally Abused: No     Physically Abused: No     Sexually Abused: No   Housing Stability: Not on file         FAMILY HISTORY:  Family History   Problem Relation Age of Onset     Colon Cancer Mother 90     Cerebrovascular Disease Father          at age 85     Dementia Brother      Prostate Cancer Brother      Thyroid Disease Brother      Dementia Brother      Thyroid Disease Brother      Pancreatic Cancer Brother      Breast Cancer Sister      Hyperlipidemia Sister      Depression Sister      Anxiety Disorder Sister      Thyroid Disease Sister      Breast Cancer Sister      Colon Cancer Niece      Other Cancer Niece         leukemia         PHYSICAL EXAM:  /69 (Cuff Size: Adult Regular)   Pulse 74   Temp 97.3  F (36.3  C) (Tympanic)   Resp 16   Ht 1.829 m (6')   Wt 73.9 kg (163 lb)   LMP  (LMP Unknown)   SpO2 97%   BMI 22.11 kg/m    ECO  GENERAL/CONSTITUTIONAL: No acute distress.  EYES: No scleral icterus.  NEUROLOGIC: Alert, oriented, answers questions appropriately.  INTEGUMENTARY: No jaundice.        LABS:  CBC RESULTS: Recent Labs   Lab Test 03/15/22  0922   WBC 4.3   RBC 3.52*   HGB 9.1*   HCT 29.7*   MCV 84   MCH 25.9*   MCHC 30.6*   RDW 16.7*   *     Recent Labs   Lab Test 03/15/22  0922 02/15/22  1449    141   POTASSIUM 4.0 4.3   CHLORIDE 108 109   CO2 26 25   ANIONGAP 5 7   * 110*   BUN 13 15   CR 0.80 0.64   CULLEN 8.6 8.9     Lab Results   Component Value Date    AST 25  03/15/2022    AST 30 06/29/2021     Lab Results   Component Value Date    ALT 21 03/15/2022    ALT 36 06/29/2021     No results found for: BILICONJ   Lab Results   Component Value Date    BILITOTAL 0.6 03/15/2022    BILITOTAL 0.8 06/29/2021     Lab Results   Component Value Date    ALBUMIN 3.5 03/15/2022    ALBUMIN 3.9 06/29/2021     Lab Results   Component Value Date    PROTTOTAL 5.9 03/15/2022    PROTTOTAL 6.4 06/29/2021      Lab Results   Component Value Date    ALKPHOS 96 03/15/2022    ALKPHOS 97 06/29/2021     Component      Latest Ref Rng & Units 3/15/2022   % Retic      0.5 - 2.0 % 1.9   Absolute Retic      0.025 - 0.095 10e6/uL 0.066   Lactate Dehydrogenase      81 - 234 U/L 234         ASSESSMENT/PLAN:  Tricia Sosa is a 81 year old female with:      1. 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 Common variable immunodeficiency disorder  4. Splenomegaly        1) Autoimmune hemolytic anemia: She had relapse in July 2020, and started on prednisone, as well as weekly rituximab infusions x 4, completed on 8/21/20.  Her hemoglobin slowly improved back up to her baseline and has been stable.  She tapered off of prednisone as of early October 2020.  She had another relapse, and stated on prednisone again on 6/8/21.  She has had good response in hemoglobin back to her baseline with prednisone alone, and has finished taper in August 2021.    We also previously talked about other treatment options if her disease becomes refractory to steroids, such as splenectomy.      Her hemoglobin dropped again, and she restarted prednisone at 70 mg daily on 11/2/21.   Her hemoglobin has improved back up to her baseline.    She does not want to consider splenectomy yet.  She does still seem to be responding to steroids.  However, she relapsed again more quickly this time.  She is willing to take rituximab again to see if she can  go longer between recurrent episodes, which she completed another 4 doses on 12/29/21.    -she has tapered off of prednisone again as of early March 2022.    -hemoglobin trended down a bit, but remains at around her baseline.  Will hold off on restarting prednisone for now.  -monitor CBC every other week for now  -she is on Bactrim for prophylaxis when she is on prednisone  -RTC in 4 weeks with  FEILZ  -RTC with me in 8 weeks with her next IVIG infusion    2) CVID:   -continue IVIG.  She gets it, if IgG is <600.  It's been about every other month.  She will get a dose today.  -IgG check and IVIG every 8 weeks if meets parameters  -she says that she had a infectious disease physician when she was at [facility] and would like a referral here.  Referral was made, but she still has not heard from them regarding appointment.  -she did see immunology, but had to go to [place], and does not want to go there      Sabina Boyd MD  Hematology/Oncology  [facility]        Total time spent on day of visit, including review of tests, obtaining/reviewing separately obtained history, ordering medications/tests/procedures, communicating with PCP/consultants, and documenting in electronic medical record: 20 minutes

## 2022-03-16 ENCOUNTER — TRANSFERRED RECORDS (OUTPATIENT)
Dept: HEALTH INFORMATION MANAGEMENT | Facility: CLINIC | Age: 82
End: 2022-03-16
Payer: MEDICARE

## 2022-03-16 LAB
HAPTOGLOB SERPL-MCNC: <3 MG/DL (ref 32–197)
IGA SERPL-MCNC: <2 MG/DL (ref 84–499)
IGG SERPL-MCNC: 342 MG/DL (ref 610–1616)
IGM SERPL-MCNC: <10 MG/DL (ref 35–242)

## 2022-03-17 ENCOUNTER — LAB (OUTPATIENT)
Dept: LAB | Facility: CLINIC | Age: 82
End: 2022-03-17
Payer: MEDICARE

## 2022-03-17 DIAGNOSIS — M62.81 MUSCLE WEAKNESS: Primary | ICD-10-CM

## 2022-03-17 LAB
ALBUMIN UR-MCNC: NEGATIVE MG/DL
APPEARANCE UR: CLEAR
BILIRUB UR QL STRIP: NEGATIVE
CK SERPL-CCNC: 94 U/L (ref 30–225)
COLOR UR AUTO: NORMAL
ERYTHROCYTE [SEDIMENTATION RATE] IN BLOOD BY WESTERGREN METHOD: 56 MM/HR (ref 0–30)
GLUCOSE UR STRIP-MCNC: NEGATIVE MG/DL
HGB UR QL STRIP: NEGATIVE
KETONES UR STRIP-MCNC: NEGATIVE MG/DL
LEUKOCYTE ESTERASE UR QL STRIP: NEGATIVE
NITRATE UR QL: NEGATIVE
PH UR STRIP: 6.5 [PH] (ref 5–7)
SP GR UR STRIP: 1.02 (ref 1–1.03)
TSH SERPL DL<=0.005 MIU/L-ACNC: 0.71 MU/L (ref 0.4–4)
UROBILINOGEN UR STRIP-MCNC: NORMAL MG/DL

## 2022-03-17 PROCEDURE — 82550 ASSAY OF CK (CPK): CPT

## 2022-03-17 PROCEDURE — 85652 RBC SED RATE AUTOMATED: CPT

## 2022-03-17 PROCEDURE — 81003 URINALYSIS AUTO W/O SCOPE: CPT

## 2022-03-17 PROCEDURE — 36415 COLL VENOUS BLD VENIPUNCTURE: CPT

## 2022-03-17 PROCEDURE — 86038 ANTINUCLEAR ANTIBODIES: CPT

## 2022-03-17 PROCEDURE — 84443 ASSAY THYROID STIM HORMONE: CPT

## 2022-03-18 LAB — ANA SER QL IF: NEGATIVE

## 2022-03-22 ENCOUNTER — PATIENT OUTREACH (OUTPATIENT)
Dept: ONCOLOGY | Facility: CLINIC | Age: 82
End: 2022-03-22
Payer: MEDICARE

## 2022-03-22 NOTE — PROGRESS NOTES
"Tricia called in to clinic reporting she was on the phone with our schedulers and had some concerns about setting up her future appointments.     She reports she recently spoke with Dr. Rutherford, her immunologist, and he felt she should have her IVIG infusions every 3-4 weeks rather than 8 weeks. She should have \"high dose\" IVIG, and receive Rituxan every 6 months so that she does not have to be on prednisone.     Tricia reports she had been on every 4 weeks IVIG but was told [place] needed to change it to every 8 weeks because of restrictions with her insurance. Tricia reports where Dr. Rutherford has ordering privileges, he reports pt's do not have the restrictions of scheduling the IVIG every 8 weeks.     Per chart review pt had previously signed an [facility] for Dr. Rutherford's clinic. Writer will contact their clinic and request most recent office visit notes be sent to Dr. Boyd.     Dr. Rutherford in [place], [place] at [facility]  Phone: [phone]    They will send office visit notes from 03/16/22 to our clinic.     Anabell Hawkins, RN, BSN, COLLEEN  RN Care Coordinator  [facility]  [phone]      "

## 2022-03-24 NOTE — PROGRESS NOTES
I called Dr. Rutherford.  I believe the IVIG frequency was due to her insurance requiring her IgG level to be <600 for it to be approved.     I would agree with doing maintenance ritxximab every 3-6 months as a maintenance.    We can talk about this at her next appointment with me, or if she would like to see me sooner to discuss, I'm happy to do that as well.

## 2022-03-24 NOTE — PROGRESS NOTES
Writer called Tricia and spoke with her. Writer discussed Dr. Boyd's message with pt. Tricia verbalized understanding. She reports she would like to wait for her next appointment with Dr. Boyd in May to discuss her treatment plan. She did not want to schedule an earlier appointment at this time.     Anabell Hawkins, RN, BSN, COLLEEN  RN Care Coordinator  [facility]  [phone]

## 2022-03-30 ENCOUNTER — TRANSFERRED RECORDS (OUTPATIENT)
Dept: HEALTH INFORMATION MANAGEMENT | Facility: CLINIC | Age: 82
End: 2022-03-30
Payer: MEDICARE

## 2022-03-31 ENCOUNTER — LAB (OUTPATIENT)
Dept: ONCOLOGY | Facility: CLINIC | Age: 82
End: 2022-03-31
Attending: PHYSICIAN ASSISTANT
Payer: MEDICARE

## 2022-03-31 DIAGNOSIS — D59.19 OTHER AUTOIMMUNE HEMOLYTIC ANEMIA (H): ICD-10-CM

## 2022-03-31 LAB
BASOPHILS # BLD AUTO: 0 10E3/UL (ref 0–0.2)
BASOPHILS NFR BLD AUTO: 0 %
EOSINOPHIL # BLD AUTO: 0 10E3/UL (ref 0–0.7)
EOSINOPHIL NFR BLD AUTO: 1 %
ERYTHROCYTE [DISTWIDTH] IN BLOOD BY AUTOMATED COUNT: 15.9 % (ref 10–15)
HCT VFR BLD AUTO: 30.3 % (ref 35–47)
HGB BLD-MCNC: 9.1 G/DL (ref 11.7–15.7)
IMM GRANULOCYTES # BLD: 0 10E3/UL
IMM GRANULOCYTES NFR BLD: 1 %
LYMPHOCYTES # BLD AUTO: 1.9 10E3/UL (ref 0.8–5.3)
LYMPHOCYTES NFR BLD AUTO: 45 %
MCH RBC QN AUTO: 25.7 PG (ref 26.5–33)
MCHC RBC AUTO-ENTMCNC: 30 G/DL (ref 31.5–36.5)
MCV RBC AUTO: 86 FL (ref 78–100)
MONOCYTES # BLD AUTO: 0.5 10E3/UL (ref 0–1.3)
MONOCYTES NFR BLD AUTO: 12 %
NEUTROPHILS # BLD AUTO: 1.7 10E3/UL (ref 1.6–8.3)
NEUTROPHILS NFR BLD AUTO: 41 %
NRBC # BLD AUTO: 0 10E3/UL
NRBC BLD AUTO-RTO: 0 /100
PLATELET # BLD AUTO: 146 10E3/UL (ref 150–450)
RBC # BLD AUTO: 3.54 10E6/UL (ref 3.8–5.2)
WBC # BLD AUTO: 4.2 10E3/UL (ref 4–11)

## 2022-03-31 PROCEDURE — 85025 COMPLETE CBC W/AUTO DIFF WBC: CPT | Performed by: INTERNAL MEDICINE

## 2022-03-31 PROCEDURE — 36415 COLL VENOUS BLD VENIPUNCTURE: CPT

## 2022-04-11 ENCOUNTER — INFUSION THERAPY VISIT (OUTPATIENT)
Dept: NURSING | Facility: CLINIC | Age: 82
End: 2022-04-11
Payer: MEDICARE

## 2022-04-11 DIAGNOSIS — Z29.89 NEED FOR PROPHYLACTIC IMMUNOTHERAPY: Primary | ICD-10-CM

## 2022-04-11 PROCEDURE — M0220 PR INJECTION TIXAGEVIMAB & CILGAVIMAB (EVUSHELD): HCPCS | Performed by: INTERNAL MEDICINE

## 2022-04-11 NOTE — PROGRESS NOTES
Evusheld Consent   Confirmed patient received the Emergency Use Authorization Fact Sheet for Patients, Parents and Caregivers prior to receiving medication. We discussed the following risks and benefits of receiving EVUSHELD, as well as alternative treatments and the patient wished to proceed with EVUSHELD.     Providers believe the benefits of Evusheld outweigh the risks for all moderately to severely immunocompromised patients.      Benefits:   Evusheld is a synthetic antibody that provides protection against COVID-19 for 6 months and is recommended for patients that have a weakened immune system that may not be able to make antibodies themselves.     Risks:    There is a very small risk of allergic reactions and you should notify us if you have any symptoms of an allergic reaction after the injection. There were also some extremely rare cardiac events reported in the initial trials in people that already had heart disease, but experts do not think these were caused by the medication. We will observe you for any chest pain or trouble breathing after the injections and you can reach out to your provider if any of these symptoms develop.     Alternatives:   Vaccines to prevent COVID-19 are approved or available under Emergency Use Authorization. Use of EVUSHELD does not replace vaccination against COVID-19. You can continue to mask and isolate to avoid infections. It is your choice to receive or not receive EVUSHELD. Should you decide not to receive EVUSHELD, it will not change your standard medical care.     Patient does consent to the injection.      EVUSHELD Administration Note:  Tricia Sosa presents today for EVUSHELD.   present during visit today: Not Applicable.    Consent:   Informed Consent confirmed in chart, obtained on this date: 4/11/2022    Post Injection Assessment:   Patient tolerated injection without incident.  Patient observed for 60 minutes post injection per protocol.      Patient  was observed in the room for a minimum of 10 minutes after injection per standard, then remained in the buidling for a total 60 minute observation period.       Discharge Plan:    Discharge instructions reviewed with: Patient.  Patient and/or family verbalized understanding of discharge instructions and all questions answered.  Patient discharged in stable condition accompanied by: self.     Libertad Silverman RN

## 2022-04-12 ENCOUNTER — LAB (OUTPATIENT)
Dept: ONCOLOGY | Facility: CLINIC | Age: 82
End: 2022-04-12
Attending: PHYSICIAN ASSISTANT
Payer: MEDICARE

## 2022-04-12 ENCOUNTER — PATIENT OUTREACH (OUTPATIENT)
Dept: ONCOLOGY | Facility: CLINIC | Age: 82
End: 2022-04-12

## 2022-04-12 VITALS
WEIGHT: 161.3 LBS | SYSTOLIC BLOOD PRESSURE: 132 MMHG | OXYGEN SATURATION: 97 % | HEART RATE: 100 BPM | RESPIRATION RATE: 16 BRPM | HEIGHT: 72 IN | BODY MASS INDEX: 21.85 KG/M2 | TEMPERATURE: 97.2 F | DIASTOLIC BLOOD PRESSURE: 71 MMHG

## 2022-04-12 DIAGNOSIS — D59.19 OTHER AUTOIMMUNE HEMOLYTIC ANEMIA (H): ICD-10-CM

## 2022-04-12 DIAGNOSIS — M62.81 GENERALIZED MUSCLE WEAKNESS: ICD-10-CM

## 2022-04-12 DIAGNOSIS — D83.9 CVID (COMMON VARIABLE IMMUNODEFICIENCY) (H): ICD-10-CM

## 2022-04-12 DIAGNOSIS — D59.10 AUTOIMMUNE HEMOLYTIC ANEMIA (H): Primary | ICD-10-CM

## 2022-04-12 DIAGNOSIS — Z79.52 CURRENT CHRONIC USE OF SYSTEMIC STEROIDS: ICD-10-CM

## 2022-04-12 LAB
BASOPHILS # BLD AUTO: 0 10E3/UL (ref 0–0.2)
BASOPHILS NFR BLD AUTO: 0 %
EOSINOPHIL # BLD AUTO: 0.1 10E3/UL (ref 0–0.7)
EOSINOPHIL NFR BLD AUTO: 1 %
ERYTHROCYTE [DISTWIDTH] IN BLOOD BY AUTOMATED COUNT: 17.4 % (ref 10–15)
HCT VFR BLD AUTO: 30.7 % (ref 35–47)
HGB BLD-MCNC: 9.6 G/DL (ref 11.7–15.7)
IMM GRANULOCYTES # BLD: 0 10E3/UL
IMM GRANULOCYTES NFR BLD: 0 %
LYMPHOCYTES # BLD AUTO: 2 10E3/UL (ref 0.8–5.3)
LYMPHOCYTES NFR BLD AUTO: 40 %
MCH RBC QN AUTO: 26.1 PG (ref 26.5–33)
MCHC RBC AUTO-ENTMCNC: 31.3 G/DL (ref 31.5–36.5)
MCV RBC AUTO: 83 FL (ref 78–100)
MONOCYTES # BLD AUTO: 0.5 10E3/UL (ref 0–1.3)
MONOCYTES NFR BLD AUTO: 11 %
NEUTROPHILS # BLD AUTO: 2.4 10E3/UL (ref 1.6–8.3)
NEUTROPHILS NFR BLD AUTO: 48 %
NRBC # BLD AUTO: 0 10E3/UL
NRBC BLD AUTO-RTO: 0 /100
PLATELET # BLD AUTO: 159 10E3/UL (ref 150–450)
RBC # BLD AUTO: 3.68 10E6/UL (ref 3.8–5.2)
WBC # BLD AUTO: 5 10E3/UL (ref 4–11)

## 2022-04-12 PROCEDURE — 36415 COLL VENOUS BLD VENIPUNCTURE: CPT

## 2022-04-12 PROCEDURE — G0463 HOSPITAL OUTPT CLINIC VISIT: HCPCS

## 2022-04-12 PROCEDURE — 99214 OFFICE O/P EST MOD 30 MIN: CPT | Performed by: PHYSICIAN ASSISTANT

## 2022-04-12 PROCEDURE — 82787 IGG 1 2 3 OR 4 EACH: CPT | Performed by: PHYSICIAN ASSISTANT

## 2022-04-12 PROCEDURE — 82784 ASSAY IGA/IGD/IGG/IGM EACH: CPT | Performed by: PHYSICIAN ASSISTANT

## 2022-04-12 PROCEDURE — 85004 AUTOMATED DIFF WBC COUNT: CPT | Performed by: INTERNAL MEDICINE

## 2022-04-12 ASSESSMENT — PAIN SCALES - GENERAL: PAINLEVEL: NO PAIN (0)

## 2022-04-12 NOTE — LETTER
4/12/2022         RE: Tricia Sosa  [address]  [facility] [id number]        Dear Colleague,    Thank you for referring your patient, Tricia Sosa, to the [facility]. Please see a copy of my visit note below.    Oncology/Hematology Visit Note  Apr 12, 2022    Reason for Visit: Follow up of hemolytic anemia-autoimmune; CVID    History of Present Illness: Tricia Sosa is a 81 year old female who presents with autoimmune hemolytic anemia and IgA deficiency.  She previously saw Dr. Matos and then Dr. Summers.  She was previously seen at [facility].     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  She was very sick at the time and had severe diarrhea.  She lost about 40 pounds in weight.  The workup revealed that she had markedly diminished CD4 counts of 48 and was diagnosed with CMV colitis.  Since then she has been followed in hematology clinic at [facility] until recently.  She was noted to have anemia which was fairly long-standing.  She was initially referred for anemia in 2004 and also had a bone marrow biopsy done at that time which revealed hypercellular bone marrow with 70-80% cellularity and normal trilineage hematopoiesis and no morphologic features of MDS or lymphoproliferation.  Her anemia was attributed to her chronic underlying disease.  At the next evaluation in 2002 on 4 aggressive anemia there was no evidence of hemolysis, iron deficiency, B12 or folate deficiency.  Bone marrow biopsy was not pursued.  In summer of 2015 she had progressive fatigue, dyspnea on exertion and worsening anemia with a hemoglobin now of 9.  Workup at this time did suggest a hemolytic anemia with elevated LDH at 709, undetectable haptoglobin and elevated unconjugated bilirubin at 3.3.  Monospecific MARIKA was positive for both IgG and complement.  Cold agglutinin screen was positive with very low titer 1:64.  She was started on prednisone 60 mg  daily with supplementation of iron folate and B12 starting 7/31/15.  Her prednisone has been slowly tapered and was discontinued off in January 2016.  She was last followed at [facility] on March 10, 2016 when she had stable hemoglobin.     She was followed by Dr. Matos and Dr. Summers.  Due to relapse of hemolytic anemia, she underwent another course of prednisone that has since been tapered off and rituximab x 4 weekly doses completed in 9/19/19.     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  She started weekly Rituxan on 7/31/20 x 4 doses, completed on 8/21/20.  She tapered off of prednisone in October 2020.     Due to relapse of her AIHA, she started prednisone again on 6/8/21 at 60 mg daily with slow taper and finished taper in August 2021.     Due to relapse of AIHA again, she started prednisone again on 11/2/21 at 70 mg daily with slow taper, completed early March 2022.  She also completed weekly Rituxuan again x 4 doses 12/6/21-12/29/21.    At her last visit they discussed splenectomy due to concern about poor response to steroids, but patient wanting to wait and consider additional rituximab when needed.    She returns today for follow-up.     Interval History:  Tricia returns to clinic today unaccompanied. 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 No SOB at rest, chest pain, cough, fevers, leg swelling. No recent infections. She is weak overall, especially in her arms, back, and upper legs. She saw her neurologist but he didn't have insights into why this was happening. She states she had a spine MRI done. She notes her CPAP doesn't fit as well. She denies any other concerns.     Current Outpatient Medications   Medication Sig Dispense Refill     cyanocobalamin (VITAMIN  B-12) 1000 MCG tablet   3     Flaxseed, Linseed, (FLAX SEEDS PO) Take by mouth daily       folic acid (FOLVITE) 1 MG tablet   3      hydrochlorothiazide (HYDRODIURIL) 12.5 MG tablet Take 1 tablet (12.5 mg) by mouth daily as needed (edema) 30 tablet 5     hydrocortisone butyrate (LOCOID) 0.1 % SOLN solution Apply to scalp bid prn 60 mL 11     ketoconazole (NIZORAL) 2 % external shampoo Use every other day to scalp as needed 120 mL 11     levothyroxine (SYNTHROID/LEVOTHROID) 150 MCG tablet Take 1 tablet (150 mcg) by mouth daily 90 tablet 3     sulfamethoxazole-trimethoprim (BACTRIM DS) 800-160 MG tablet Take 1 pill orally on Mon, Wed and Friday 45 tablet 3     triamcinolone (KENALOG) 0.1 % external cream        UNABLE TO FIND privigen inj every two months         Past Medical History  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, and posterior vitreous detachment     Urinary tract infection, site not specified     Recurrent UTI's     Past Surgical History:   Procedure Laterality Date     COLONOSCOPY N/A 4/26/2016    Procedure: COLONOSCOPY;  Surgeon: Dontae Del Rio MD;  Location:  GI     ENT SURGERY  1985    Thyroid lobectomy     EYE SURGERY Bilateral 04/20/2021    Cataract Surgery      CYSTOSCOPY,INSERT URETERAL STENT      Ureteral stent insertion      FLEX SIGMOIDOSCOPY W BIOPSY  5/30/00      LAPAROSCOPY, SURGICAL; CHOLECYSTECTOMY  1992      THYROIDECTOMY       LIGATION OF HEMORRHOID(S)      Hemorrhoidectomy     UPPER GI ENDOSCOPY,BIOPSY  10/01/01     [facility] LIGATE FALLOPIAN TUBE        [facility] COLONOSCOPY W BIOPSY  4/26/00     [facility] COLONOSCOPY W BIOPSY  10/8/03     [facility] COLONOSCOPY W BIOPSY  6/27/05    REPEAT IN 5 YEARS     Allergies   Allergen Reactions     Doxycycline      Social History   Social History     Tobacco Use     Smoking status: Never Smoker     Smokeless tobacco: Never Used   Substance Use Topics     Alcohol use: Yes     Alcohol/week: 0.0 standard drinks     Comment: 1 a day at most     Drug use: No      Past medical history and social history were reviewed.    Physical Examination:  /71   Pulse 100   Temp 97.2  F (36.2  C) (Tympanic)   Resp 16   Ht 1.829 m (6')   Wt 73.2 kg (161 lb 4.8 oz)   LMP  (LMP Unknown)   SpO2 97%   BMI 21.88 kg/m    Wt Readings from Last 10 Encounters:   03/15/22 73.9 kg (163 lb)   01/18/22 72.1 kg (159 lb)   01/03/22 70.3 kg (155 lb)   12/29/21 72.3 kg (159 lb 6.4 oz)   12/21/21 70.5 kg (155 lb 8 oz)   12/15/21 69.9 kg (154 lb 3.2 oz)   12/06/21 70.5 kg (155 lb 8 oz)   11/23/21 68.9 kg (152 lb)   11/16/21 69 kg (152 lb 3.2 oz)   09/28/21 71 kg (156 lb 9.6 oz)     Constitutional: Well-appearing female in no acute distress.  Eyes: EOMI, PERRL. No scleral icterus.  ENT: Deferred   Lymphatic: Neck is supple without cervical or supraclavicular lymphadenopathy.   Cardiovascular: Regular rate and rhythm. No murmurs, gallops, or rubs. No peripheral edema.  Respiratory: Clear to auscultation bilaterally. No wheezes or crackles.  Gastrointestinal: Bowel sounds present. Abdomen soft, non-tender.   Neurologic: Cranial nerves II through XII are grossly intact.  Skin: No rashes, petechiae, or bruising noted on exposed skin.    Ambulatory O2 % at all times.     Laboratory Data:   Latest Reference Range & Units 04/12/22 13:20   WBC 4.0 - 11.0 10e3/uL 5.0   Hemoglobin 11.7 - 15.7 g/dL 9.6 (L)   Hematocrit 35.0 - 47.0 % 30.7 (L)   Platelet Count 150 - 450 10e3/uL 159   RBC Count 3.80 - 5.20 10e6/uL 3.68 (L)   MCV 78 - 100 fL 83   MCH 26.5 - 33.0 pg 26.1  (L)   MCHC 31.5 - 36.5 g/dL 31.3 (L)   RDW 10.0 - 15.0 % 17.4 (H)   % Neutrophils % 48   % Lymphocytes % 40   % Monocytes % 11   % Eosinophils % 1   % Basophils % 0   Absolute Basophils 0.0 - 0.2 10e3/uL 0.0   Absolute Eosinophils 0.0 - 0.7 10e3/uL 0.1   Absolute Immature Granulocytes <=0.4 10e3/uL 0.0   Absolute Lymphocytes 0.8 - 5.3 10e3/uL 2.0   Absolute Monocytes 0.0 - 1.3 10e3/uL 0.5   % Immature Granulocytes % 0   Absolute Neutrophils 1.6 - 8.3 10e3/uL 2.4   Absolute NRBCs 10e3/uL 0.0   NRBCs per 100 WBC <1 /100 0   (L): Data is abnormally low  (H): Data is abnormally high      Assessment and Plan:  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 Positive cold agglutinin screen with low cold agglutinin titers. Has had multiple relapses, most recently November 2021. She completed prolonged prednisone taper and Rituxan. She is off prednisone now. Has bactrim when she is on prednisone.     Hgb today improved. Dr. Boyd and I discuss doing Rituxan every 3-4 months with hopes of preventing relapse. Will schedule next Rituxan within the next few weeks.     Continue monthly CBC monitoring. Will check complete set of labs when she sees Dr. Boyd in May.    2. CVID  Follows with immunology as well. Will try to give IVIG monthly and check IgG subclass to see if she has selective IgG deficiency. Levels drawn today. Will get within the next few weeks -OK to give same day as Rituxan.    No recent infections.    3. WARD/Weakness  Ongoing, likely multifactorial. Did reach out to her sleep team about CPAP concerns. PM&R referral placed. Will get outside MRI report.    With normal O2 sats and exam no need for urgent imaging today. Hgb improved so do not feel this is contributing. Will monitor.     30 minutes spent on the date of the encounter doing chart review, review of test results, interpretation of tests, patient visit, documentation and discussion with other provider(s)     Isaiah Man  HIPOLITO  Department of Hematology and Oncology  [facility]         Again, thank you for allowing me to participate in the care of your patient.        Sincerely,        CHELSEA Marlow

## 2022-04-12 NOTE — PROGRESS NOTES
Oncology/Hematology Visit Note  Apr 12, 2022    Reason for Visit: Follow up of hemolytic anemia-autoimmune; CVID    History of Present Illness: Tricia Sosa is a 81 year old female who presents with autoimmune hemolytic anemia and IgA deficiency.  She previously saw Dr. Matos and then Dr. Summers.  She was previously seen at [facility].     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  She was very sick at the time and had severe diarrhea.  She lost about 40 pounds in weight.  The workup revealed that she had markedly diminished CD4 counts of 48 and was diagnosed with CMV colitis.  Since then she has been followed in hematology clinic at [place] until recently.  She was noted to have anemia which was fairly long-standing.  She was initially referred for anemia in 2004 and also had a bone marrow biopsy done at that time which revealed hypercellular bone marrow with 70-80% cellularity and normal trilineage hematopoiesis and no morphologic features of MDS or lymphoproliferation.  Her anemia was attributed to her chronic underlying disease.  At the next evaluation in 2002 on 4 aggressive anemia there was no evidence of hemolysis, iron deficiency, B12 or folate deficiency.  Bone marrow biopsy was not pursued.  In summer of 2015 she had progressive fatigue, dyspnea on exertion and worsening anemia with a hemoglobin now of 9.  Workup at this time did suggest a hemolytic anemia with elevated LDH at 709, undetectable haptoglobin and elevated unconjugated bilirubin at 3.3.  Monospecific MARIKA was positive for both IgG and complement.  Cold agglutinin screen was positive with very low titer 1:64.  She was started on prednisone 60 mg daily with supplementation of iron folate and B12 starting 7/31/15.  Her prednisone has been slowly tapered and was discontinued off in January 2016.  She was last followed at [facility] on March 10, 2016 when she had stable hemoglobin.     She was followed by  Dr. Matos and Dr. Summers.  Due to relapse of hemolytic anemia, she underwent another course of prednisone that has since been tapered off and rituximab x 4 weekly doses completed in 9/19/19.     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  She started weekly Rituxan on 7/31/20 x 4 doses, completed on 8/21/20.  She tapered off of prednisone in October 2020.     Due to relapse of her AIHA, she started prednisone again on 6/8/21 at 60 mg daily with slow taper and finished taper in August 2021.     Due to relapse of AIHA again, she started prednisone again on 11/2/21 at 70 mg daily with slow taper, completed early March 2022.  She also completed weekly Rituxuan again x 4 doses 12/6/21-12/29/21.    At her last visit they discussed splenectomy due to concern about poor response to steroids, but patient wanting to wait and consider additional rituximab when needed.    She returns today for follow-up.     Interval History:  Tricia returns to clinic today unaccompanied. 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 No SOB at rest, chest pain, cough, fevers, leg swelling. No recent infections. She is weak overall, especially in her arms, back, and upper legs. She saw her neurologist but he didn't have insights into why this was happening. She states she had a spine MRI done. She notes her CPAP doesn't fit as well. She denies any other concerns.     Current Outpatient Medications   Medication Sig Dispense Refill     cyanocobalamin (VITAMIN  B-12) 1000 MCG tablet   3     Flaxseed, Linseed, (FLAX SEEDS PO) Take by mouth daily       folic acid (FOLVITE) 1 MG tablet   3     hydrochlorothiazide (HYDRODIURIL) 12.5 MG tablet Take 1 tablet (12.5 mg) by mouth daily as needed (edema) 30 tablet 5     hydrocortisone butyrate (LOCOID) 0.1 % SOLN solution Apply to scalp bid prn 60 mL 11     ketoconazole (NIZORAL) 2 % external shampoo Use  every other day to scalp as needed 120 mL 11     levothyroxine (SYNTHROID/LEVOTHROID) 150 MCG tablet Take 1 tablet (150 mcg) by mouth daily 90 tablet 3     sulfamethoxazole-trimethoprim (BACTRIM DS) 800-160 MG tablet Take 1 pill orally on Mon, Wed and Friday 45 tablet 3     triamcinolone (KENALOG) 0.1 % external cream        UNABLE TO FIND privigen inj every two months         Past Medical History  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, and posterior vitreous detachment     Urinary tract infection, site not specified     Recurrent UTI's     Past Surgical History:   Procedure Laterality Date     COLONOSCOPY N/A 4/26/2016    Procedure: COLONOSCOPY;  Surgeon: Dontae Del Rio MD;  Location:  GI     ENT SURGERY  1985    Thyroid lobectomy     EYE SURGERY Bilateral 04/20/2021    Cataract Surgery      CYSTOSCOPY,INSERT URETERAL STENT      Ureteral stent insertion      FLEX SIGMOIDOSCOPY W BIOPSY  5/30/00      LAPAROSCOPY, SURGICAL; CHOLECYSTECTOMY  1992      THYROIDECTOMY       LIGATION OF HEMORRHOID(S)      Hemorrhoidectomy     UPPER GI ENDOSCOPY,BIOPSY  10/01/01     [facility] LIGATE FALLOPIAN TUBE       [facility] COLONOSCOPY W BIOPSY  4/26/00     ZZ COLONOSCOPY W BIOPSY  10/8/03     [facility] COLONOSCOPY W BIOPSY  6/27/05    REPEAT IN 5 YEARS     Allergies   Allergen Reactions     Doxycycline      Social History   Social History     Tobacco Use     Smoking  status: Never Smoker     Smokeless tobacco: Never Used   Substance Use Topics     Alcohol use: Yes     Alcohol/week: 0.0 standard drinks     Comment: 1 a day at most     Drug use: No      Past medical history and social history were reviewed.    Physical Examination:  /71   Pulse 100   Temp 97.2  F (36.2  C) (Tympanic)   Resp 16   Ht 1.829 m (6')   Wt 73.2 kg (161 lb 4.8 oz)   LMP  (LMP Unknown)   SpO2 97%   BMI 21.88 kg/m    Wt Readings from Last 10 Encounters:   03/15/22 73.9 kg (163 lb)   01/18/22 72.1 kg (159 lb)   01/03/22 70.3 kg (155 lb)   12/29/21 72.3 kg (159 lb 6.4 oz)   12/21/21 70.5 kg (155 lb 8 oz)   12/15/21 69.9 kg (154 lb 3.2 oz)   12/06/21 70.5 kg (155 lb 8 oz)   11/23/21 68.9 kg (152 lb)   11/16/21 69 kg (152 lb 3.2 oz)   09/28/21 71 kg (156 lb 9.6 oz)     Constitutional: Well-appearing female in no acute distress.  Eyes: EOMI, PERRL. No scleral icterus.  ENT: Deferred   Lymphatic: Neck is supple without cervical or supraclavicular lymphadenopathy.   Cardiovascular: Regular rate and rhythm. No murmurs, gallops, or rubs. No peripheral edema.  Respiratory: Clear to auscultation bilaterally. No wheezes or crackles.  Gastrointestinal: Bowel sounds present. Abdomen soft, non-tender.   Neurologic: Cranial nerves II through XII are grossly intact.  Skin: No rashes, petechiae, or bruising noted on exposed skin.    Ambulatory O2 % at all times.     Laboratory Data:   Latest Reference Range & Units 04/12/22 13:20   WBC 4.0 - 11.0 10e3/uL 5.0   Hemoglobin 11.7 - 15.7 g/dL 9.6 (L)   Hematocrit 35.0 - 47.0 % 30.7 (L)   Platelet Count 150 - 450 10e3/uL 159   RBC Count 3.80 - 5.20 10e6/uL 3.68 (L)   MCV 78 - 100 fL 83   MCH 26.5 - 33.0 pg 26.1 (L)   MCHC 31.5 - 36.5 g/dL 31.3 (L)   RDW 10.0 - 15.0 % 17.4 (H)   % Neutrophils % 48   % Lymphocytes % 40   % Monocytes % 11   % Eosinophils % 1   % Basophils % 0   Absolute Basophils 0.0 - 0.2 10e3/uL 0.0   Absolute Eosinophils 0.0 - 0.7 10e3/uL 0.1    Absolute Immature Granulocytes <=0.4 10e3/uL 0.0   Absolute Lymphocytes 0.8 - 5.3 10e3/uL 2.0   Absolute Monocytes 0.0 - 1.3 10e3/uL 0.5   % Immature Granulocytes % 0   Absolute Neutrophils 1.6 - 8.3 10e3/uL 2.4   Absolute NRBCs 10e3/uL 0.0   NRBCs per 100 WBC <1 /100 0   (L): Data is abnormally low  (H): Data is abnormally high      Assessment and Plan:  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 Positive cold agglutinin screen with low cold agglutinin titers. Has had multiple relapses, most recently November 2021. She completed prolonged prednisone taper and Rituxan. She is off prednisone now. Has bactrim when she is on prednisone.     Hgb today improved. Dr. Boyd and I discuss doing Rituxan every 3-4 months with hopes of preventing relapse. Will schedule next Rituxan within the next few weeks.     Continue monthly CBC monitoring. Will check complete set of labs when she sees Dr. Boyd in May.    2. CVID  Follows with immunology as well. Will try to give IVIG monthly and check IgG subclass to see if she has selective IgG deficiency. Levels drawn today. Will get within the next few weeks -OK to give same day as Rituxan.    No recent infections.    3. WARD/Weakness  Ongoing, likely multifactorial. Did reach out to her sleep team about CPAP concerns. PM&R referral placed. Will get outside MRI report.    With normal O2 sats and exam no need for urgent imaging today. Hgb improved so do not feel this is contributing. Will monitor.     30 minutes spent on the date of the encounter doing chart review, review of test results, interpretation of tests, patient visit, documentation and discussion with other provider(s)     Isaiah Man PA-C  Department of Hematology and Oncology  [facility]

## 2022-04-12 NOTE — PROGRESS NOTES
received referral for Dr Heart in the PM&R clinic. Scheduling instructions updated and sent to New Patient Scheduling for completion.

## 2022-04-12 NOTE — NURSING NOTE
Oncology Rooming Note    April 12, 2022 1:30 PM   Tricia Sosa is a 81 year old female who presents for:    Chief Complaint   Patient presents with     Oncology Clinic Visit     Other autoimmune hemolytic anemia     Initial Vitals: /71   Pulse 100   Temp 97.2  F (36.2  C) (Tympanic)   Resp 16   Ht 1.829 m (6')   Wt 73.2 kg (161 lb 4.8 oz)   LMP  (LMP Unknown)   SpO2 97%   BMI 21.88 kg/m   Estimated body mass index is 21.88 kg/m  as calculated from the following:    Height as of this encounter: 1.829 m (6').    Weight as of this encounter: 73.2 kg (161 lb 4.8 oz). Body surface area is 1.93 meters squared.  No Pain (0) Comment: Data Unavailable   No LMP recorded (lmp unknown). Patient is postmenopausal.  Allergies reviewed: Yes  Medications reviewed: Yes    Medications: Medication refills not needed today.  Pharmacy name entered into [facility]:    [place], [place] - [address] [id number] [place], [place] - [address] [facility]    Clinical concerns: f/u       Barbara Corrigan, CMA

## 2022-04-12 NOTE — PROGRESS NOTES
Medical Assistant Note:  Tricia Sosa presents today for blood draw.    Patient seen by provider today: Yes: Isaiah PARKER   present during visit today: Not Applicable.    Concerns: No Concerns.    Procedure:  Lab draw site: right antecub, Needle type: butterfly, Gauge: 21.    Post Assessment:  Labs drawn without difficulty: Yes.    Discharge Plan:  Departure Mode: Ambulatory.    Face to Face Time: 10.    Barbara Corrigan, CMA

## 2022-04-13 ENCOUNTER — PATIENT OUTREACH (OUTPATIENT)
Dept: ONCOLOGY | Facility: CLINIC | Age: 82
End: 2022-04-13
Payer: MEDICARE

## 2022-04-13 NOTE — PROGRESS NOTES
Writer requested by HIPOLITO Colon to obtain pt's MRI report from patient's neurology clinic.     Writer called Tricia and spoke with her. She reports she sees NP Ally Dunn and Dr. Brock Abbott at the [facility] and she had an MRI on 03/30/22.     Phone: [phone]  Fax for medical records dept: [phone]    Writer called and left a detailed voicemail on the medical records fax with our clinic phone and fax. Will wait for a call back and watch for the report.     Anabell Hawkins, RN, BSN, COLLEEN  RN Care Coordinator  [facility]  [phone]

## 2022-04-14 NOTE — PROGRESS NOTES
Incoming MRI spine thoracic report from 3/30/22 exam received from [facility] with Tricia's results. Will update HIPOLITO Colon with results.     Anabell Hawkins, RN, BSN, COLLEEN  RN Care Coordinator  [facility]  [phone]

## 2022-04-15 ENCOUNTER — DOCUMENTATION ONLY (OUTPATIENT)
Dept: SLEEP MEDICINE | Facility: CLINIC | Age: 82
End: 2022-04-15
Payer: MEDICARE

## 2022-04-15 LAB
IGA SERPL-MCNC: <2 MG/DL (ref 84–499)
IGG SERPL-MCNC: 637 MG/DL (ref 610–1616)
IGG SERPL-MCNC: 637 MG/DL (ref 610–1616)
IGG1 SER-MCNC: 395 MG/DL (ref 382–929)
IGG2 SER-MCNC: 207 MG/DL (ref 242–700)
IGG3 SER-MCNC: 20 MG/DL (ref 22–176)
IGG4 SER-MCNC: 7 MG/DL (ref 4–86)
IGM SERPL-MCNC: 11 MG/DL (ref 35–242)
SUBCLASSES, PERCENT: 99 %

## 2022-04-15 NOTE — PROGRESS NOTES
STM Recheck: Patient feels like she doesn't get a restful nights sleep and her humidity is to warm. Turned patient heated hose to 1. Kept her humidity at 3.  Patient setting were at 4-20 changed them back to her original settings. Gave patient number to [facility].

## 2022-04-22 ENCOUNTER — DOCUMENTATION ONLY (OUTPATIENT)
Dept: SLEEP MEDICINE | Facility: CLINIC | Age: 82
End: 2022-04-22
Payer: MEDICARE

## 2022-04-22 NOTE — PROGRESS NOTES
Oncology/Hematology Visit Note  Apr 25, 2022    CVID      History of Present Illness: Tricia Sosa is a 81 year old female who presents with autoimmune hemolytic anemia and IgA deficiency.  She previously saw Dr. Matos and then Dr. Summers.  She was previously seen at [facility].     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  She was very sick at the time and had severe diarrhea.  She lost about 40 pounds in weight.  The workup revealed that she had markedly diminished CD4 counts of 48 and was diagnosed with CMV colitis.  Since then she has been followed in hematology clinic at [place] until recently.  She was noted to have anemia which was fairly long-standing.  She was initially referred for anemia in 2004 and also had a bone marrow biopsy done at that time which revealed hypercellular bone marrow with 70-80% cellularity and normal trilineage hematopoiesis and no morphologic features of MDS or lymphoproliferation.  Her anemia was attributed to her chronic underlying disease.  At the next evaluation in 2002 on 4 aggressive anemia there was no evidence of hemolysis, iron deficiency, B12 or folate deficiency.  Bone marrow biopsy was not pursued.  In summer of 2015 she had progressive fatigue, dyspnea on exertion and worsening anemia with a hemoglobin now of 9.  Workup at this time did suggest a hemolytic anemia with elevated LDH at 709, undetectable haptoglobin and elevated unconjugated bilirubin at 3.3.  Monospecific MARIKA was positive for both IgG and complement.  Cold agglutinin screen was positive with very low titer 1:64.  She was started on prednisone 60 mg daily with supplementation of iron folate and B12 starting 7/31/15.  Her prednisone has been slowly tapered and was discontinued off in January 2016.  She was last followed at [facility] on March 10, 2016 when she had stable hemoglobin.     She was followed by Dr. Matos and Dr. Summers.  Due to relapse of hemolytic anemia,  she underwent another course of prednisone that has since been tapered off and rituximab x 4 weekly doses completed in 9/19/19.     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  She started weekly Rituxan on 7/31/20 x 4 doses, completed on 8/21/20.  She tapered off of prednisone in October 2020.     Due to relapse of her AIHA, she started prednisone again on 6/8/21 at 60 mg daily with slow taper and finished taper in August 2021.     Due to relapse of AIHA again, she started prednisone again on 11/2/21 at 70 mg daily with slow taper, completed early March 2022.  She also completed weekly Rituxuan again x 4 doses 12/6/21-12/29/21.     At her last visit they discussed splenectomy due to concern about poor response to steroids, but patient wanting to wait and consider additional rituximab when needed.     She returns today for follow-up.     Interval History:  Tricia is overall feeling well. No infectious concerns. She continues to be very fatigued and easily winded. She denies SOB at rest. No chest pain or cough. No GI concerns or edema. No bleeding.     Current Outpatient Medications   Medication Sig Dispense Refill     cyanocobalamin (VITAMIN  B-12) 1000 MCG tablet   3     Flaxseed, Linseed, (FLAX SEEDS PO) Take by mouth daily       folic acid (FOLVITE) 1 MG tablet   3     hydrochlorothiazide (HYDRODIURIL) 12.5 MG tablet Take 1 tablet (12.5 mg) by mouth daily as needed (edema) 30 tablet 5     hydrocortisone butyrate (LOCOID) 0.1 % SOLN solution Apply to scalp bid prn 60 mL 11     ketoconazole (NIZORAL) 2 % external shampoo Use every other day to scalp as needed 120 mL 11     levothyroxine (SYNTHROID/LEVOTHROID) 150 MCG tablet Take 1 tablet (150 mcg) by mouth daily 90 tablet 3     sulfamethoxazole-trimethoprim (BACTRIM DS) 800-160 MG tablet Take 1 pill orally on Mon, Wed and Friday 45 tablet 3     triamcinolone (KENALOG) 0.1 % external cream        UNABLE TO  FIND privigen inj every two months         Past Medical History  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, and posterior vitreous detachment     Urinary tract infection, site not specified     Recurrent UTI's     Past Surgical History:   Procedure Laterality Date     COLONOSCOPY N/A 4/26/2016    Procedure: COLONOSCOPY;  Surgeon: Dontae Del Rio MD;  Location:  GI     ENT SURGERY  1985    Thyroid lobectomy     EYE SURGERY Bilateral 04/20/2021    Cataract Surgery     HC CYSTOSCOPY,INSERT URETERAL STENT      Ureteral stent insertion     HC FLEX SIGMOIDOSCOPY W BIOPSY  5/30/00     HC LAPAROSCOPY, SURGICAL; CHOLECYSTECTOMY  1992      THYROIDECTOMY       LIGATION OF HEMORRHOID(S)      Hemorrhoidectomy     UPPER GI ENDOSCOPY,BIOPSY  10/01/01     ZZC LIGATE FALLOPIAN TUBE       ZZHC COLONOSCOPY W BIOPSY  4/26/00     ZZHC COLONOSCOPY W BIOPSY  10/8/03     ZZHC COLONOSCOPY W BIOPSY  6/27/05    REPEAT IN 5 YEARS     Allergies   Allergen Reactions     Doxycycline      Social History   Social History     Tobacco Use     Smoking status: Never Smoker     Smokeless tobacco: Never Used   Substance Use Topics     Alcohol use: Yes     Alcohol/week: 0.0 standard drinks     Comment: 1 a day at most     Drug use: No      Past medical history and social history were reviewed.    Physical Examination:  /66   Pulse 81   Temp 97.1  F (36.2  C) (Tympanic)   LMP  (LMP  Unknown)   Wt Readings from Last 10 Encounters:   04/12/22 73.2 kg (161 lb 4.8 oz)   03/15/22 73.9 kg (163 lb)   01/18/22 72.1 kg (159 lb)   01/03/22 70.3 kg (155 lb)   12/29/21 72.3 kg (159 lb 6.4 oz)   12/21/21 70.5 kg (155 lb 8 oz)   12/15/21 69.9 kg (154 lb 3.2 oz)   12/06/21 70.5 kg (155 lb 8 oz)   11/23/21 68.9 kg (152 lb)   11/16/21 69 kg (152 lb 3.2 oz)     Constitutional: Well-appearing female in no acute distress.  Eyes: EOMI, PERRL. No scleral icterus.  ENT: Oral mucosa is moist without lesions or thrush.   Lymphatic: Neck is supple without cervical or supraclavicular lymphadenopathy.   Cardiovascular: Regular rate and rhythm. No murmurs, gallops, or rubs. No peripheral edema.  Respiratory: Clear to auscultation bilaterally. No wheezes or crackles.  Gastrointestinal: Bowel sounds present. Abdomen soft, non-tender. No palpable hepatosplenomegaly or masses.   Neurologic: Cranial nerves II through XII are grossly intact.  Skin: No rashes, petechiae, or bruising noted on exposed skin.    Laboratory Data:   Latest Reference Range & Units 04/12/22 14:51 04/25/22 09:19 04/25/22 14:07   Ferritin 8 - 252 ng/mL   6 (L)   Iron 35 - 180 ug/dL   19 (L)   Iron Binding Cap 240 - 430 ug/dL   384   Iron Saturation Index 15 - 46 %   5 (L)   Soluble Transferrin Receptor 1.9 - 4.4 mg/L   8.6 (H) [1]   Vitamin B12 193 - 986 pg/mL   801   WBC 4.0 - 11.0 10e3/uL  4.0    Hemoglobin 11.7 - 15.7 g/dL  8.6 (L)    Hematocrit 35.0 - 47.0 %  27.2 (L)    Platelet Count 150 - 450 10e3/uL  152    RBC Count 3.80 - 5.20 10e6/uL  3.27 (L)    MCV 78 - 100 fL  83    MCH 26.5 - 33.0 pg  26.3 (L)    MCHC 31.5 - 36.5 g/dL  31.6    RDW 10.0 - 15.0 %  17.2 (H)    % Neutrophils %  53    % Lymphocytes %  34    % Monocytes %  12    % Eosinophils %  1    % Basophils %  0    Absolute Basophils 0.0 - 0.2 10e3/uL  0.0    Absolute Eosinophils 0.0 - 0.7 10e3/uL  0.0    Absolute Immature Granulocytes <=0.4 10e3/uL  0.0    Absolute Lymphocytes 0.8 - 5.3  10e3/uL  1.4    Absolute Monocytes 0.0 - 1.3 10e3/uL  0.5    % Immature Granulocytes %  0    Absolute Neutrophils 1.6 - 8.3 10e3/uL  2.1    Absolute NRBCs 10e3/uL  0.0    NRBCs per 100 WBC <1 /100  0    IGA 84 - 499 mg/dL <2 (L)      - 1,616 mg/dL  610 - 1,616 mg/dL 637  637     IgG1 382 - 929 mg/dL 395     IgG2 242 - 700 mg/dL 207 (L)     IgG3 22 - 176 mg/dL 20 (L)     IgG4 4 - 86 mg/dL 7     IGM 35 - 242 mg/dL 11 (L)     (L): Data is abnormally low  (H): Data is abnormally high  [1] INTERPRETIVE INFORMATION: Soluble Transferrin Receptor   People of  descent and those residing at 5200 feet    (1600 meters) above sea level were found to have a 6%    higher normal value. These differences were additive.    Reference intervals have not been established for pregnant    females, patients under 18 years of age, and recent or    frequent blood donors.      Serum soluble transferrin receptor increases in iron    deficiency and is usually unaffected by chronic disease    states. In general, to increase sensitivity and    specificity, the measurement of serum soluble transferrin    receptor should be performed in combination with other    tests of iron status, including ferritin, TIBC, and serum    iron.   (See Table Below).                Tests for  Iron     Anemia of    Combined Iron             Changes    Def.     Chronic      Def. and anemia   Analyte   in:        Anemia   Disease      of Chronic Dz   -------   --------   ------   ---------    ---------------   Ferritin  Fe Stores  Low      High         Normal or High   TIBC      Fe Status  High     Low          Normal or High   Serum Fe  Fe Status  Low      Low          Low   sTfR      Fe Status  High     Normal       High   Performed By: [employer]   [address] [place] [address]   : Sandra Johnson MD      Assessment and Plan:  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 Positive cold  agglutinin screen with low cold agglutinin titers. Has had multiple relapses, most recently November 2021. She completed prolonged prednisone taper and Rituxan. She is off prednisone now. Has bactrim when she is on prednisone.      Hgb is slightly lower today. Discussed with Dr. Boyd and will do the 4 weeks of Rituxan with hopes of avoiding prednisone. Will get first dose today.      Continue monthly CBC monitoring. Will check complete set of labs when she sees Dr. Boyd in May.     2. CVID  Follows with immunology as well. Will try to give IVIG monthly per immunology recommendations. Her IgG subclass is low and she has done much better with less infections with consistent IVIG infusions. Will get infusion today. Repeat in 4 weeks.      3. WARD/Weakness  -Sleep team working on CPAP  -PM&R scheduled for weakness/back issues.  -Prior echo and PFT WNL  -Prior CT did mention pulmonary nodules and recommended repeat imaging. Ordered.  -Her ferritin is very low at 6 and soluble transferrin elevated consistent with EVI. Will schedule for Venofer x 3 with her Rituxan infusions. She states she is up to date on colonoscopies and denies bleeding.     15 minutes spent on the date of the encounter doing chart review, review of test results, interpretation of tests, patient visit and documentation     Isaiah Man PA-C  Department of Hematology and Oncology  [facility]

## 2022-04-22 NOTE — PROGRESS NOTES
Patient came to virtual set up via telephone visit for mask fitting appointment on April 22, 2022. Patient requested to switch masks because general discomfort . Discussed the following masks: AIRFIT N20, ESON 2 Patient selected a Resmed Mask name: AIRFIT N20 Nasal mask size Medium.  PT WILL ALSO PICKUP A CHINSTRAP WITH THE MASK.

## 2022-04-25 ENCOUNTER — INFUSION THERAPY VISIT (OUTPATIENT)
Dept: INFUSION THERAPY | Facility: CLINIC | Age: 82
End: 2022-04-25
Attending: INTERNAL MEDICINE
Payer: MEDICARE

## 2022-04-25 ENCOUNTER — ONCOLOGY VISIT (OUTPATIENT)
Dept: ONCOLOGY | Facility: CLINIC | Age: 82
End: 2022-04-25
Attending: PHYSICIAN ASSISTANT
Payer: MEDICARE

## 2022-04-25 VITALS — HEART RATE: 81 BPM | TEMPERATURE: 97.1 F | DIASTOLIC BLOOD PRESSURE: 66 MMHG | SYSTOLIC BLOOD PRESSURE: 120 MMHG

## 2022-04-25 VITALS
BODY MASS INDEX: 22.04 KG/M2 | SYSTOLIC BLOOD PRESSURE: 120 MMHG | DIASTOLIC BLOOD PRESSURE: 66 MMHG | HEART RATE: 81 BPM | TEMPERATURE: 97.1 F | WEIGHT: 162.5 LBS | RESPIRATION RATE: 18 BRPM | OXYGEN SATURATION: 96 %

## 2022-04-25 DIAGNOSIS — R53.83 OTHER FATIGUE: ICD-10-CM

## 2022-04-25 DIAGNOSIS — D59.19 OTHER AUTOIMMUNE HEMOLYTIC ANEMIA (H): Primary | ICD-10-CM

## 2022-04-25 DIAGNOSIS — D64.9 ANEMIA, UNSPECIFIED TYPE: ICD-10-CM

## 2022-04-25 DIAGNOSIS — D59.10 AUTOIMMUNE HEMOLYTIC ANEMIA (H): ICD-10-CM

## 2022-04-25 DIAGNOSIS — D83.9 CVID (COMMON VARIABLE IMMUNODEFICIENCY) (H): Primary | ICD-10-CM

## 2022-04-25 DIAGNOSIS — D59.19 OTHER AUTOIMMUNE HEMOLYTIC ANEMIA (H): ICD-10-CM

## 2022-04-25 DIAGNOSIS — R06.00 DYSPNEA, UNSPECIFIED TYPE: ICD-10-CM

## 2022-04-25 DIAGNOSIS — D50.9 IRON DEFICIENCY ANEMIA, UNSPECIFIED IRON DEFICIENCY ANEMIA TYPE: ICD-10-CM

## 2022-04-25 DIAGNOSIS — D80.3 SELECTIVE DEFICIENCY OF IGG SUBCLASSES (H): ICD-10-CM

## 2022-04-25 DIAGNOSIS — D83.9 CVID (COMMON VARIABLE IMMUNODEFICIENCY) (H): ICD-10-CM

## 2022-04-25 LAB
BASOPHILS # BLD AUTO: 0 10E3/UL (ref 0–0.2)
BASOPHILS NFR BLD AUTO: 0 %
EOSINOPHIL # BLD AUTO: 0 10E3/UL (ref 0–0.7)
EOSINOPHIL NFR BLD AUTO: 1 %
ERYTHROCYTE [DISTWIDTH] IN BLOOD BY AUTOMATED COUNT: 17.2 % (ref 10–15)
FERRITIN SERPL-MCNC: 6 NG/ML (ref 8–252)
HCT VFR BLD AUTO: 27.2 % (ref 35–47)
HGB BLD-MCNC: 8.6 G/DL (ref 11.7–15.7)
IMM GRANULOCYTES # BLD: 0 10E3/UL
IMM GRANULOCYTES NFR BLD: 0 %
IRON SATN MFR SERPL: 5 % (ref 15–46)
IRON SERPL-MCNC: 19 UG/DL (ref 35–180)
LYMPHOCYTES # BLD AUTO: 1.4 10E3/UL (ref 0.8–5.3)
LYMPHOCYTES NFR BLD AUTO: 34 %
MCH RBC QN AUTO: 26.3 PG (ref 26.5–33)
MCHC RBC AUTO-ENTMCNC: 31.6 G/DL (ref 31.5–36.5)
MCV RBC AUTO: 83 FL (ref 78–100)
MONOCYTES # BLD AUTO: 0.5 10E3/UL (ref 0–1.3)
MONOCYTES NFR BLD AUTO: 12 %
NEUTROPHILS # BLD AUTO: 2.1 10E3/UL (ref 1.6–8.3)
NEUTROPHILS NFR BLD AUTO: 53 %
NRBC # BLD AUTO: 0 10E3/UL
NRBC BLD AUTO-RTO: 0 /100
PLATELET # BLD AUTO: 152 10E3/UL (ref 150–450)
RBC # BLD AUTO: 3.27 10E6/UL (ref 3.8–5.2)
TIBC SERPL-MCNC: 384 UG/DL (ref 240–430)
VIT B12 SERPL-MCNC: 801 PG/ML (ref 193–986)
WBC # BLD AUTO: 4 10E3/UL (ref 4–11)

## 2022-04-25 PROCEDURE — 96367 TX/PROPH/DG ADDL SEQ IV INF: CPT

## 2022-04-25 PROCEDURE — 83550 IRON BINDING TEST: CPT | Performed by: PHYSICIAN ASSISTANT

## 2022-04-25 PROCEDURE — 96415 CHEMO IV INFUSION ADDL HR: CPT

## 2022-04-25 PROCEDURE — 36415 COLL VENOUS BLD VENIPUNCTURE: CPT | Performed by: PHYSICIAN ASSISTANT

## 2022-04-25 PROCEDURE — 82607 VITAMIN B-12: CPT | Performed by: PHYSICIAN ASSISTANT

## 2022-04-25 PROCEDURE — 96375 TX/PRO/DX INJ NEW DRUG ADDON: CPT

## 2022-04-25 PROCEDURE — 250N000011 HC RX IP 250 OP 636: Performed by: INTERNAL MEDICINE

## 2022-04-25 PROCEDURE — 85025 COMPLETE CBC W/AUTO DIFF WBC: CPT | Performed by: PHYSICIAN ASSISTANT

## 2022-04-25 PROCEDURE — 84238 ASSAY NONENDOCRINE RECEPTOR: CPT | Performed by: PHYSICIAN ASSISTANT

## 2022-04-25 PROCEDURE — 250N000011 HC RX IP 250 OP 636: Performed by: PHYSICIAN ASSISTANT

## 2022-04-25 PROCEDURE — 96413 CHEMO IV INFUSION 1 HR: CPT

## 2022-04-25 PROCEDURE — 82728 ASSAY OF FERRITIN: CPT | Performed by: PHYSICIAN ASSISTANT

## 2022-04-25 PROCEDURE — 258N000003 HC RX IP 258 OP 636: Performed by: PHYSICIAN ASSISTANT

## 2022-04-25 PROCEDURE — 99214 OFFICE O/P EST MOD 30 MIN: CPT | Performed by: PHYSICIAN ASSISTANT

## 2022-04-25 PROCEDURE — 250N000013 HC RX MED GY IP 250 OP 250 PS 637: Performed by: PHYSICIAN ASSISTANT

## 2022-04-25 PROCEDURE — 36415 COLL VENOUS BLD VENIPUNCTURE: CPT

## 2022-04-25 RX ORDER — MEPERIDINE HYDROCHLORIDE 25 MG/ML
25 INJECTION INTRAMUSCULAR; INTRAVENOUS; SUBCUTANEOUS
Status: CANCELLED | OUTPATIENT
Start: 2022-04-25

## 2022-04-25 RX ORDER — HEPARIN SODIUM,PORCINE 10 UNIT/ML
5 VIAL (ML) INTRAVENOUS
Status: CANCELLED | OUTPATIENT
Start: 2022-04-25

## 2022-04-25 RX ORDER — ALBUTEROL SULFATE 0.83 MG/ML
2.5 SOLUTION RESPIRATORY (INHALATION)
Status: CANCELLED | OUTPATIENT
Start: 2022-05-09

## 2022-04-25 RX ORDER — MEPERIDINE HYDROCHLORIDE 25 MG/ML
25 INJECTION INTRAMUSCULAR; INTRAVENOUS; SUBCUTANEOUS EVERY 30 MIN PRN
Status: CANCELLED | OUTPATIENT
Start: 2022-04-25

## 2022-04-25 RX ORDER — HEPARIN SODIUM,PORCINE 10 UNIT/ML
5 VIAL (ML) INTRAVENOUS
Status: CANCELLED | OUTPATIENT
Start: 2022-05-09

## 2022-04-25 RX ORDER — HEPARIN SODIUM (PORCINE) LOCK FLUSH IV SOLN 100 UNIT/ML 100 UNIT/ML
5 SOLUTION INTRAVENOUS
Status: CANCELLED | OUTPATIENT
Start: 2022-04-25

## 2022-04-25 RX ORDER — METHYLPREDNISOLONE SODIUM SUCCINATE 125 MG/2ML
125 INJECTION, POWDER, LYOPHILIZED, FOR SOLUTION INTRAMUSCULAR; INTRAVENOUS
Status: CANCELLED
Start: 2022-05-09

## 2022-04-25 RX ORDER — NALOXONE HYDROCHLORIDE 0.4 MG/ML
0.2 INJECTION, SOLUTION INTRAMUSCULAR; INTRAVENOUS; SUBCUTANEOUS
Status: CANCELLED | OUTPATIENT
Start: 2022-04-25

## 2022-04-25 RX ORDER — METHYLPREDNISOLONE SODIUM SUCCINATE 125 MG/2ML
125 INJECTION, POWDER, LYOPHILIZED, FOR SOLUTION INTRAMUSCULAR; INTRAVENOUS
Status: CANCELLED | OUTPATIENT
Start: 2022-04-25

## 2022-04-25 RX ORDER — DIPHENHYDRAMINE HCL 25 MG
50 CAPSULE ORAL ONCE
Status: COMPLETED | OUTPATIENT
Start: 2022-04-25 | End: 2022-04-25

## 2022-04-25 RX ORDER — MEPERIDINE HYDROCHLORIDE 25 MG/ML
25 INJECTION INTRAMUSCULAR; INTRAVENOUS; SUBCUTANEOUS EVERY 30 MIN PRN
Status: CANCELLED | OUTPATIENT
Start: 2022-05-09

## 2022-04-25 RX ORDER — ACETAMINOPHEN 325 MG/1
650 TABLET ORAL ONCE
Status: COMPLETED | OUTPATIENT
Start: 2022-04-25 | End: 2022-04-25

## 2022-04-25 RX ORDER — DIPHENHYDRAMINE HCL 25 MG
50 CAPSULE ORAL ONCE
Status: CANCELLED
Start: 2022-05-09

## 2022-04-25 RX ORDER — ACETAMINOPHEN 325 MG/1
650 TABLET ORAL ONCE
Status: CANCELLED
Start: 2022-04-25

## 2022-04-25 RX ORDER — ACETAMINOPHEN 325 MG/1
650 TABLET ORAL ONCE
Status: CANCELLED
Start: 2022-05-09

## 2022-04-25 RX ORDER — METHYLPREDNISOLONE SODIUM SUCCINATE 125 MG/2ML
125 INJECTION, POWDER, LYOPHILIZED, FOR SOLUTION INTRAMUSCULAR; INTRAVENOUS
Status: CANCELLED
Start: 2022-04-25

## 2022-04-25 RX ORDER — DIPHENHYDRAMINE HYDROCHLORIDE 50 MG/ML
50 INJECTION INTRAMUSCULAR; INTRAVENOUS
Status: CANCELLED
Start: 2022-04-25

## 2022-04-25 RX ORDER — LORAZEPAM 2 MG/ML
0.5 INJECTION INTRAMUSCULAR EVERY 4 HOURS PRN
Status: CANCELLED
Start: 2022-04-25

## 2022-04-25 RX ORDER — ALBUTEROL SULFATE 90 UG/1
1-2 AEROSOL, METERED RESPIRATORY (INHALATION)
Status: CANCELLED
Start: 2022-04-25

## 2022-04-25 RX ORDER — EPINEPHRINE 1 MG/ML
0.3 INJECTION, SOLUTION INTRAMUSCULAR; SUBCUTANEOUS EVERY 5 MIN PRN
Status: CANCELLED | OUTPATIENT
Start: 2022-04-25

## 2022-04-25 RX ORDER — DIPHENHYDRAMINE HYDROCHLORIDE 50 MG/ML
50 INJECTION INTRAMUSCULAR; INTRAVENOUS
Status: CANCELLED
Start: 2022-05-09

## 2022-04-25 RX ORDER — HEPARIN SODIUM (PORCINE) LOCK FLUSH IV SOLN 100 UNIT/ML 100 UNIT/ML
5 SOLUTION INTRAVENOUS
Status: CANCELLED | OUTPATIENT
Start: 2022-05-09

## 2022-04-25 RX ORDER — DIPHENHYDRAMINE HCL 25 MG
50 CAPSULE ORAL ONCE
Status: CANCELLED
Start: 2022-04-25

## 2022-04-25 RX ORDER — NALOXONE HYDROCHLORIDE 0.4 MG/ML
0.2 INJECTION, SOLUTION INTRAMUSCULAR; INTRAVENOUS; SUBCUTANEOUS
Status: CANCELLED | OUTPATIENT
Start: 2022-05-09

## 2022-04-25 RX ORDER — ALBUTEROL SULFATE 90 UG/1
1-2 AEROSOL, METERED RESPIRATORY (INHALATION)
Status: CANCELLED
Start: 2022-05-09

## 2022-04-25 RX ORDER — EPINEPHRINE 1 MG/ML
0.3 INJECTION, SOLUTION INTRAMUSCULAR; SUBCUTANEOUS EVERY 5 MIN PRN
Status: CANCELLED | OUTPATIENT
Start: 2022-05-09

## 2022-04-25 RX ORDER — ALBUTEROL SULFATE 0.83 MG/ML
2.5 SOLUTION RESPIRATORY (INHALATION)
Status: CANCELLED | OUTPATIENT
Start: 2022-04-25

## 2022-04-25 RX ADMIN — HUMAN IMMUNOGLOBULIN G 40 G: 40 LIQUID INTRAVENOUS at 09:50

## 2022-04-25 RX ADMIN — DIPHENHYDRAMINE HYDROCHLORIDE 25 MG: 25 CAPSULE ORAL at 12:08

## 2022-04-25 RX ADMIN — ACETAMINOPHEN 650 MG: 325 TABLET ORAL at 12:08

## 2022-04-25 RX ADMIN — RITUXIMAB-ABBS 700 MG: 10 INJECTION, SOLUTION INTRAVENOUS at 12:26

## 2022-04-25 RX ADMIN — HYDROCORTISONE SODIUM SUCCINATE 100 MG: 100 INJECTION, POWDER, FOR SOLUTION INTRAMUSCULAR; INTRAVENOUS at 09:40

## 2022-04-25 ASSESSMENT — PAIN SCALES - GENERAL: PAINLEVEL: NO PAIN (0)

## 2022-04-25 NOTE — PROGRESS NOTES
Infusion Nursing Note:  Tricia Sosa presents today for IVIG, Rituxan.    Patient seen by provider today: Yes: Isaiah Man PA-C   present during visit today: Not Applicable.    Note: Care transferred to Ally Adams RN at 11:15AM, report given, all questions answered.     Rituxan given rapid infusion and will be getting weekly-MV    Intravenous Access:  Labs drawn without difficulty.  Peripheral IV placed.    Treatment Conditions:  Biological Infusion Checklist:  ~~~ NOTE: If the patient answers yes to any of the questions below, hold the infusion and contact ordering provider or on-call provider.    1. Have you recently had an elevated temperature, fever, chills, productive cough, coughing for 3 weeks or longer or hemoptysis, abnormal vital signs, night sweats,  chest pain or have you noticed a decrease in your appetite, unexplained weight loss or fatigue? No  2. Do you have any open wounds or new incisions? No  3. Do you have any recent or upcoming hospitalizations, surgeries or dental procedures? No  4. Do you currently have or recently have had any signs of illness or infection or are you on any antibiotics? Yes, bactrim, chronic  5. Have you had any new, sudden or worsening abdominal pain? No  6. Have you or anyone in your household received a live vaccination in the past 4 weeks? Please note:  No live vaccines while on biologic/chemotherapy until 6 months after the last treatment.  Patient can receive the flu vaccine (shot only) and the pneumovax.  It is optimal for the patient to get these vaccines mid cycle, but they can be given at any time as long as it is not on the day of the infusion. No  7. Have you recently been diagnosed with any new nervous system diseases (ie. Multiple sclerosis, Guillain Mohawk, seizures, neurological changes) or cancer diagnosis? No  8. Are you on any form of radiation or chemotherapy? No  9. Are you pregnant or breast feeding or do you have plans of  pregnancy in the future? No  10. Have you been having any signs of worsening depression or suicidal ideations?  (benlysta only) No  11. Have there been any other new onset medical symptoms? No     CBC RESULTS: Recent Labs   Lab Test 04/25/22  0919   WBC 4.0   RBC 3.27*   HGB 8.6*   HCT 27.2*   MCV 83   MCH 26.3*   MCHC 31.6   RDW 17.2*          Post Infusion Assessment:  Patient tolerated infusion without incident.  Blood return noted pre and post infusion.  Site patent and intact, free from redness, edema or discomfort.  No evidence of extravasations.  Access discontinued per protocol.       Discharge Plan:   AVS to patient via MYCHART.    Patient discharged in stable condition accompanied by: self.  Departure Mode: Ambulatory.      Roya Ng RN

## 2022-04-25 NOTE — PROGRESS NOTES
Patient seen in infusion and vitals collected in infusion encounter.    Tircia Marshall CMA on 4/25/2022 at 10:19 AM

## 2022-04-26 LAB — STFR SERPL-MCNC: 8.6 MG/L

## 2022-04-27 PROBLEM — D50.9 ANEMIA, IRON DEFICIENCY: Status: ACTIVE | Noted: 2022-04-27

## 2022-04-27 RX ORDER — ALBUTEROL SULFATE 90 UG/1
1-2 AEROSOL, METERED RESPIRATORY (INHALATION)
Status: CANCELLED
Start: 2022-04-27

## 2022-04-27 RX ORDER — MEPERIDINE HYDROCHLORIDE 25 MG/ML
25 INJECTION INTRAMUSCULAR; INTRAVENOUS; SUBCUTANEOUS EVERY 30 MIN PRN
Status: CANCELLED | OUTPATIENT
Start: 2022-04-27

## 2022-04-27 RX ORDER — METHYLPREDNISOLONE SODIUM SUCCINATE 125 MG/2ML
125 INJECTION, POWDER, LYOPHILIZED, FOR SOLUTION INTRAMUSCULAR; INTRAVENOUS
Status: CANCELLED
Start: 2022-04-27

## 2022-04-27 RX ORDER — DIPHENHYDRAMINE HYDROCHLORIDE 50 MG/ML
50 INJECTION INTRAMUSCULAR; INTRAVENOUS
Status: CANCELLED
Start: 2022-04-27

## 2022-04-27 RX ORDER — HEPARIN SODIUM (PORCINE) LOCK FLUSH IV SOLN 100 UNIT/ML 100 UNIT/ML
5 SOLUTION INTRAVENOUS
Status: CANCELLED | OUTPATIENT
Start: 2022-04-27

## 2022-04-27 RX ORDER — NALOXONE HYDROCHLORIDE 0.4 MG/ML
0.2 INJECTION, SOLUTION INTRAMUSCULAR; INTRAVENOUS; SUBCUTANEOUS
Status: CANCELLED | OUTPATIENT
Start: 2022-04-27

## 2022-04-27 RX ORDER — ALBUTEROL SULFATE 0.83 MG/ML
2.5 SOLUTION RESPIRATORY (INHALATION)
Status: CANCELLED | OUTPATIENT
Start: 2022-04-27

## 2022-04-27 RX ORDER — EPINEPHRINE 1 MG/ML
0.3 INJECTION, SOLUTION INTRAMUSCULAR; SUBCUTANEOUS EVERY 5 MIN PRN
Status: CANCELLED | OUTPATIENT
Start: 2022-04-27

## 2022-04-27 RX ORDER — HEPARIN SODIUM,PORCINE 10 UNIT/ML
5 VIAL (ML) INTRAVENOUS
Status: CANCELLED | OUTPATIENT
Start: 2022-04-27

## 2022-04-28 DIAGNOSIS — G47.14 SLEEP DISORDER DUE TO A GENERAL MEDICAL CONDITION, HYPERSOMNIA TYPE: ICD-10-CM

## 2022-04-28 DIAGNOSIS — G47.33 OSA (OBSTRUCTIVE SLEEP APNEA): Primary | ICD-10-CM

## 2022-04-29 ENCOUNTER — HOSPITAL ENCOUNTER (OUTPATIENT)
Dept: CT IMAGING | Facility: CLINIC | Age: 82
Discharge: HOME | End: 2022-04-29
Attending: PHYSICIAN ASSISTANT | Admitting: PHYSICIAN ASSISTANT
Payer: MEDICARE

## 2022-04-29 DIAGNOSIS — R53.83 OTHER FATIGUE: ICD-10-CM

## 2022-04-29 DIAGNOSIS — R06.00 DYSPNEA, UNSPECIFIED TYPE: ICD-10-CM

## 2022-04-29 PROCEDURE — 71250 CT THORAX DX C-: CPT | Mod: MF

## 2022-04-29 NOTE — PROGRESS NOTES
RECORDS STATUS - ALL OTHER DIAGNOSIS      RECORDS RECEIVED FROM: [facility]   DATE RECEIVED: 5/10/2022   NOTES STATUS DETAILS   OFFICE NOTE from referring provider Complete Epic   Isaiah Man PA   OFFICE NOTE from medical oncologist Complete 4/25/2022    Other autoimmune hemolytic anemia (H)   4/12/2022    Autoimmune hemolytic anemia (H)   More in EPIC   DISCHARGE SUMMARY from hospital     DISCHARGE REPORT from the ER     OPERATIVE REPORT     MEDICATION LIST Complete [facility]   CLINICAL TRIAL TREATMENTS TO DATE     LABS     PATHOLOGY REPORTS     ANYTHING RELATED TO DIAGNOSIS Complete Labs last updated on 4/25/2022    GENONOMIC TESTING     TYPE:     IMAGING (NEED IMAGES & REPORT)     CT SCANS Complete CT Chest 4/29/2022   MRI     MAMMO     ULTRASOUND     PET

## 2022-05-02 DIAGNOSIS — D83.9 CVID (COMMON VARIABLE IMMUNODEFICIENCY) (H): ICD-10-CM

## 2022-05-02 DIAGNOSIS — D59.19 OTHER AUTOIMMUNE HEMOLYTIC ANEMIA (H): Primary | ICD-10-CM

## 2022-05-02 RX ORDER — HEPARIN SODIUM (PORCINE) LOCK FLUSH IV SOLN 100 UNIT/ML 100 UNIT/ML
5 SOLUTION INTRAVENOUS
Status: CANCELLED | OUTPATIENT
Start: 2022-05-02

## 2022-05-02 RX ORDER — NALOXONE HYDROCHLORIDE 0.4 MG/ML
0.2 INJECTION, SOLUTION INTRAMUSCULAR; INTRAVENOUS; SUBCUTANEOUS
Status: CANCELLED | OUTPATIENT
Start: 2022-05-02

## 2022-05-02 RX ORDER — MEPERIDINE HYDROCHLORIDE 25 MG/ML
25 INJECTION INTRAMUSCULAR; INTRAVENOUS; SUBCUTANEOUS
Status: CANCELLED | OUTPATIENT
Start: 2022-05-02

## 2022-05-02 RX ORDER — MEPERIDINE HYDROCHLORIDE 25 MG/ML
25 INJECTION INTRAMUSCULAR; INTRAVENOUS; SUBCUTANEOUS EVERY 30 MIN PRN
Status: CANCELLED | OUTPATIENT
Start: 2022-05-02

## 2022-05-02 RX ORDER — METHYLPREDNISOLONE SODIUM SUCCINATE 125 MG/2ML
125 INJECTION, POWDER, LYOPHILIZED, FOR SOLUTION INTRAMUSCULAR; INTRAVENOUS
Status: CANCELLED | OUTPATIENT
Start: 2022-05-02

## 2022-05-02 RX ORDER — HEPARIN SODIUM,PORCINE 10 UNIT/ML
5 VIAL (ML) INTRAVENOUS
Status: CANCELLED | OUTPATIENT
Start: 2022-05-02

## 2022-05-02 RX ORDER — DIPHENHYDRAMINE HYDROCHLORIDE 50 MG/ML
50 INJECTION INTRAMUSCULAR; INTRAVENOUS
Status: CANCELLED
Start: 2022-05-02

## 2022-05-02 RX ORDER — ACETAMINOPHEN 325 MG/1
650 TABLET ORAL ONCE
Status: CANCELLED | OUTPATIENT
Start: 2022-05-02

## 2022-05-02 RX ORDER — EPINEPHRINE 1 MG/ML
0.3 INJECTION, SOLUTION INTRAMUSCULAR; SUBCUTANEOUS EVERY 5 MIN PRN
Status: CANCELLED | OUTPATIENT
Start: 2022-05-02

## 2022-05-02 RX ORDER — DIPHENHYDRAMINE HCL 25 MG
50 CAPSULE ORAL ONCE
Status: CANCELLED | OUTPATIENT
Start: 2022-05-02

## 2022-05-02 RX ORDER — ALBUTEROL SULFATE 0.83 MG/ML
2.5 SOLUTION RESPIRATORY (INHALATION)
Status: CANCELLED | OUTPATIENT
Start: 2022-05-02

## 2022-05-02 RX ORDER — ALBUTEROL SULFATE 90 UG/1
1-2 AEROSOL, METERED RESPIRATORY (INHALATION)
Status: CANCELLED
Start: 2022-05-02

## 2022-05-02 RX ORDER — METHYLPREDNISOLONE SODIUM SUCCINATE 125 MG/2ML
125 INJECTION, POWDER, LYOPHILIZED, FOR SOLUTION INTRAMUSCULAR; INTRAVENOUS
Status: CANCELLED
Start: 2022-05-02

## 2022-05-02 RX ORDER — LORAZEPAM 2 MG/ML
0.5 INJECTION INTRAMUSCULAR EVERY 4 HOURS PRN
Status: CANCELLED
Start: 2022-05-02

## 2022-05-05 ENCOUNTER — INFUSION THERAPY VISIT (OUTPATIENT)
Dept: INFUSION THERAPY | Facility: CLINIC | Age: 82
End: 2022-05-05
Attending: INTERNAL MEDICINE
Payer: MEDICARE

## 2022-05-05 VITALS
SYSTOLIC BLOOD PRESSURE: 128 MMHG | HEART RATE: 72 BPM | TEMPERATURE: 97.7 F | DIASTOLIC BLOOD PRESSURE: 68 MMHG | OXYGEN SATURATION: 97 % | RESPIRATION RATE: 16 BRPM

## 2022-05-05 DIAGNOSIS — D50.9 IRON DEFICIENCY ANEMIA, UNSPECIFIED IRON DEFICIENCY ANEMIA TYPE: ICD-10-CM

## 2022-05-05 DIAGNOSIS — D83.9 CVID (COMMON VARIABLE IMMUNODEFICIENCY) (H): Primary | ICD-10-CM

## 2022-05-05 DIAGNOSIS — D59.19 OTHER AUTOIMMUNE HEMOLYTIC ANEMIA (H): ICD-10-CM

## 2022-05-05 LAB
BASOPHILS # BLD AUTO: 0 10E3/UL (ref 0–0.2)
BASOPHILS NFR BLD AUTO: 0 %
EOSINOPHIL # BLD AUTO: 0 10E3/UL (ref 0–0.7)
EOSINOPHIL NFR BLD AUTO: 1 %
ERYTHROCYTE [DISTWIDTH] IN BLOOD BY AUTOMATED COUNT: 16.8 % (ref 10–15)
HCT VFR BLD AUTO: 29 % (ref 35–47)
HGB BLD-MCNC: 8.8 G/DL (ref 11.7–15.7)
IMM GRANULOCYTES # BLD: 0 10E3/UL
IMM GRANULOCYTES NFR BLD: 0 %
LYMPHOCYTES # BLD AUTO: 1.5 10E3/UL (ref 0.8–5.3)
LYMPHOCYTES NFR BLD AUTO: 44 %
MCH RBC QN AUTO: 26.3 PG (ref 26.5–33)
MCHC RBC AUTO-ENTMCNC: 30.3 G/DL (ref 31.5–36.5)
MCV RBC AUTO: 87 FL (ref 78–100)
MONOCYTES # BLD AUTO: 0.4 10E3/UL (ref 0–1.3)
MONOCYTES NFR BLD AUTO: 13 %
NEUTROPHILS # BLD AUTO: 1.5 10E3/UL (ref 1.6–8.3)
NEUTROPHILS NFR BLD AUTO: 42 %
NRBC # BLD AUTO: 0 10E3/UL
NRBC BLD AUTO-RTO: 0 /100
PLATELET # BLD AUTO: 146 10E3/UL (ref 150–450)
RBC # BLD AUTO: 3.34 10E6/UL (ref 3.8–5.2)
WBC # BLD AUTO: 3.5 10E3/UL (ref 4–11)

## 2022-05-05 PROCEDURE — 96415 CHEMO IV INFUSION ADDL HR: CPT

## 2022-05-05 PROCEDURE — 250N000013 HC RX MED GY IP 250 OP 250 PS 637: Performed by: PHYSICIAN ASSISTANT

## 2022-05-05 PROCEDURE — 96367 TX/PROPH/DG ADDL SEQ IV INF: CPT

## 2022-05-05 PROCEDURE — 96413 CHEMO IV INFUSION 1 HR: CPT

## 2022-05-05 PROCEDURE — 96366 THER/PROPH/DIAG IV INF ADDON: CPT

## 2022-05-05 PROCEDURE — 250N000011 HC RX IP 250 OP 636: Performed by: PHYSICIAN ASSISTANT

## 2022-05-05 PROCEDURE — 36415 COLL VENOUS BLD VENIPUNCTURE: CPT | Performed by: PHYSICIAN ASSISTANT

## 2022-05-05 PROCEDURE — 85025 COMPLETE CBC W/AUTO DIFF WBC: CPT | Performed by: PHYSICIAN ASSISTANT

## 2022-05-05 PROCEDURE — 258N000003 HC RX IP 258 OP 636: Performed by: PHYSICIAN ASSISTANT

## 2022-05-05 RX ORDER — HEPARIN SODIUM,PORCINE 10 UNIT/ML
5 VIAL (ML) INTRAVENOUS
Status: CANCELLED | OUTPATIENT
Start: 2022-05-07

## 2022-05-05 RX ORDER — ALBUTEROL SULFATE 90 UG/1
1-2 AEROSOL, METERED RESPIRATORY (INHALATION)
Status: CANCELLED
Start: 2022-05-07

## 2022-05-05 RX ORDER — NALOXONE HYDROCHLORIDE 0.4 MG/ML
0.2 INJECTION, SOLUTION INTRAMUSCULAR; INTRAVENOUS; SUBCUTANEOUS
Status: CANCELLED | OUTPATIENT
Start: 2022-05-07

## 2022-05-05 RX ORDER — ALBUTEROL SULFATE 0.83 MG/ML
2.5 SOLUTION RESPIRATORY (INHALATION)
Status: CANCELLED | OUTPATIENT
Start: 2022-05-07

## 2022-05-05 RX ORDER — ACETAMINOPHEN 325 MG/1
650 TABLET ORAL ONCE
Status: COMPLETED | OUTPATIENT
Start: 2022-05-05 | End: 2022-05-05

## 2022-05-05 RX ORDER — EPINEPHRINE 1 MG/ML
0.3 INJECTION, SOLUTION INTRAMUSCULAR; SUBCUTANEOUS EVERY 5 MIN PRN
Status: CANCELLED | OUTPATIENT
Start: 2022-05-07

## 2022-05-05 RX ORDER — DIPHENHYDRAMINE HCL 25 MG
50 CAPSULE ORAL ONCE
Status: COMPLETED | OUTPATIENT
Start: 2022-05-05 | End: 2022-05-05

## 2022-05-05 RX ORDER — DIPHENHYDRAMINE HYDROCHLORIDE 50 MG/ML
50 INJECTION INTRAMUSCULAR; INTRAVENOUS
Status: CANCELLED
Start: 2022-05-07

## 2022-05-05 RX ORDER — METHYLPREDNISOLONE SODIUM SUCCINATE 125 MG/2ML
125 INJECTION, POWDER, LYOPHILIZED, FOR SOLUTION INTRAMUSCULAR; INTRAVENOUS
Status: CANCELLED
Start: 2022-05-07

## 2022-05-05 RX ORDER — MEPERIDINE HYDROCHLORIDE 25 MG/ML
25 INJECTION INTRAMUSCULAR; INTRAVENOUS; SUBCUTANEOUS EVERY 30 MIN PRN
Status: CANCELLED | OUTPATIENT
Start: 2022-05-07

## 2022-05-05 RX ORDER — HEPARIN SODIUM (PORCINE) LOCK FLUSH IV SOLN 100 UNIT/ML 100 UNIT/ML
5 SOLUTION INTRAVENOUS
Status: CANCELLED | OUTPATIENT
Start: 2022-05-07

## 2022-05-05 RX ADMIN — SODIUM CHLORIDE 250 ML: 9 INJECTION, SOLUTION INTRAVENOUS at 13:00

## 2022-05-05 RX ADMIN — IRON SUCROSE 300 MG: 20 INJECTION, SOLUTION INTRAVENOUS at 14:53

## 2022-05-05 RX ADMIN — ACETAMINOPHEN 650 MG: 325 TABLET ORAL at 12:45

## 2022-05-05 RX ADMIN — RITUXIMAB-ABBS 700 MG: 10 INJECTION, SOLUTION INTRAVENOUS at 13:03

## 2022-05-05 RX ADMIN — DIPHENHYDRAMINE HYDROCHLORIDE 25 MG: 25 CAPSULE ORAL at 12:45

## 2022-05-05 NOTE — PROGRESS NOTES
Infusion Nursing Note:  Tricia Sosa presents today for Labs, Rituxan (rapid infusion), Venofer 300.    Patient seen by provider today: No   present during visit today: Not Applicable.    Note: Worsening fatigue over the last month. Endorses getting winded easily with activity. Denies dizziness/lightheadedness. No fever/chills/cough/chest pain.     PIV infiltrated mid-way through Rituxan - minimal pain, no redness or itching to site. New PIV placed and infusion re-started.    Intravenous Access:  Labs drawn without difficulty.  Peripheral IV placed.    Treatment Conditions:  Lab Results   Component Value Date    HGB 8.8 (L) 05/05/2022    WBC 3.5 (L) 05/05/2022    ANEU 6.8 07/06/2021    ANEUTAUTO 1.5 (L) 05/05/2022     (L) 05/05/2022      Results reviewed, labs MET treatment parameters, ok to proceed with treatment.      Post Infusion Assessment:  Patient tolerated infusion without incident.  Blood return noted pre and post infusion.  Site patent and intact, free from redness, edema or discomfort.  No evidence of extravasations.  Access discontinued per protocol.       Discharge Plan:   AVS to patient via MYCHART.  Patient will return 5/12 for next appointment.   Patient discharged in stable condition accompanied by: self.  Departure Mode: Ambulatory.      Sharan Schaefer RN

## 2022-05-09 NOTE — PROGRESS NOTES
Tricia is a 81 year old who is being evaluated via a billable video visit.      Tricia Sosa stated she is in the state of [place] for the visit today.    How would you like to obtain your AVS? Yoselinhart  If the video visit is dropped, the invitation should be resent by: Send to e-mail at: [email or web address]  Will anyone else be joining your video visit? Yes, son      Video Start Time: 10:00 AM  Video-Visit Details    Type of service:  Video Visit    Video End Time:11:00 AM    Originating Location (pt. Location): Home    Distant Location (provider location):  [facility]     Platform used for Video Visit: Fany Zuñiga, Virtual Visit Facilitator               PM&R Clinic Note     Patient Name: Tricia Sosa : 1940 Medical Record: [id number]     Requesting Physician/clinician: Isaiah Man PA           History of Present Illness:     Tricia Sosa is a 81 year old female with with autoimmune hemolytic anemia and IgA deficiency.  She presents to the clinic for evaluation of her rehabilitation needs.    Hematology history obtained from Isaiah Man PA notes on 2022:  Tricia has been diagnosed with IgA deficiency since her around .  She was very sick at the time and had severe diarrhea.  She lost about 40 pounds in weight.  The workup revealed that she had markedly diminished CD4 counts of 48 and was diagnosed with CMV colitis.  Since then she has been followed in hematology clinic at [place] until recently.  She was noted to have anemia which was fairly long-standing.  She was initially referred for anemia in  and also had a bone marrow biopsy done at that time which revealed hypercellular bone marrow with 70-80% cellularity and normal trilineage hematopoiesis and no morphologic features of MDS or lymphoproliferation.  Her anemia was attributed to her chronic underlying disease.  At the next evaluation in  on 4 aggressive anemia there was no  "evidence of hemolysis, iron deficiency, B12 or folate deficiency.  Bone marrow biopsy was not pursued.  In summer of 2015 she had progressive fatigue, dyspnea on exertion and worsening anemia with a hemoglobin now of 9.  Workup at this time did suggest a hemolytic anemia with elevated LDH at 709, undetectable haptoglobin and elevated unconjugated bilirubin at 3.3.  Monospecific MARIKA was positive for both IgG and complement.  Cold agglutinin screen was positive with very low titer 1:64.  She was started on prednisone 60 mg daily with supplementation of iron folate and B12 starting 7/31/15.  Her prednisone has been slowly tapered and was discontinued off in January 2016.  She was last followed at [facility] on March 10, 2016 when she had stable hemoglobin.     She was followed by Dr. Matos and Dr. Summers.  Due to relapse of hemolytic anemia, she underwent another course of prednisone that has since been tapered off and rituximab x 4 weekly doses completed in 9/19/19.     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  She started weekly Rituxan on 7/31/20 x 4 doses, completed on 8/21/20.  She tapered off of prednisone in October 2020.     Due to relapse of her AIHA, she started prednisone again on 6/8/21 at 60 mg daily with slow taper and finished taper in August 2021.     Due to relapse of AIHA again, she started prednisone again on 11/2/21 at 70 mg daily with slow taper, completed early March 2022.  She also completed weekly Rituxuan again x 4 doses 12/6/21-12/29/21.     At her last visit they discussed splenectomy due to concern about poor response to steroids, but patient wanting to wait and consider additional rituximab when needed.       Symptoms,  She reports that she has been having Poor balance; she reports that he balance is \"terrible\". She reports also having generalized weakness overall her body. She reports that she Leaning forward to compensate for " the impaired balance.  She reports that she uses single point cane. She had been on steroid for her autoimmune hemolytic anemia. She reports that she stopped steroid in 2/2022.  She has has been having falls over the past yea. She reports that the last fall was on  1/2022. She denies any falls or near falls since then.  She reports that she is able to catch herself. She reports that she has been using the chair lift to go upstairs. She denies any lower back pain. She reports localized neck pain with no radiation. She was seen at the neurology [place] for the impaired balance, unfortunately we do not have access to those records.    She reports she has had bilateral feet numbness over the plantar surface. She reports she had EMG done  for the numbness. She reports that she was diagnosed with peripheral neuropathy.     Therapies/HEP,last formal physical therapy was in 5/2021.     Functionally, she modified independent with mobility. She reports that performing activities of daily living has been challenging.            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, and posterior vitreous detachment     Urinary tract infection, site not specified     Recurrent UTI's     Past Surgical History:   Procedure Laterality Date     COLONOSCOPY N/A 4/26/2016    Procedure: COLONOSCOPY;  Surgeon: Dontae Del Rio MD;  Location:  GI      ENT SURGERY  1985    Thyroid lobectomy     EYE SURGERY Bilateral 2021    Cataract Surgery      CYSTOSCOPY,INSERT URETERAL STENT      Ureteral stent insertion     HC FLEX SIGMOIDOSCOPY W BIOPSY  00     HC LAPAROSCOPY, SURGICAL; CHOLECYSTECTOMY        THYROIDECTOMY       LIGATION OF HEMORRHOID(S)      Hemorrhoidectomy     UPPER GI ENDOSCOPY,BIOPSY  10/01/01     Z LIGATE FALLOPIAN TUBE       ZZ COLONOSCOPY W BIOPSY  00     ZZ COLONOSCOPY W BIOPSY  10/8/03     ZZHC COLONOSCOPY W BIOPSY  05    REPEAT IN 5 YEARS            Social History:     Social History     Tobacco Use     Smoking status: Never Smoker     Smokeless tobacco: Never Used   Substance Use Topics     Alcohol use: Yes     Alcohol/week: 0.0 standard drinks     Comment: 1 a day at most       Marital Status:     Living situation: she lives with son and daughter in law.   Family support: available   Vocational History: office work            Functional history:     Tricia Sosa is modified independent with all aspects of her life.    ADLs: it is difficult to do activities of daily living.   Assistive devices: single point cane   iADLs (medication management and finances): independent   Driving: no         Family History:     Family History   Problem Relation Age of Onset     Colon Cancer Mother 90     Cerebrovascular Disease Father          at age 85     Dementia Brother      Prostate Cancer Brother      Thyroid Disease Brother      Dementia Brother      Thyroid Disease Brother      Pancreatic Cancer Brother      Breast Cancer Sister      Hyperlipidemia Sister      Depression Sister      Anxiety Disorder Sister      Thyroid Disease Sister      Breast Cancer Sister      Colon Cancer Niece      Other Cancer Niece         leukemia            Medications:     Current Outpatient Medications   Medication Sig Dispense Refill     cyanocobalamin (VITAMIN  B-12) 1000 MCG tablet   3     Flaxseed, Linseed, (FLAX SEEDS PO)  Take by mouth daily       folic acid (FOLVITE) 1 MG tablet   3     hydrochlorothiazide (HYDRODIURIL) 12.5 MG tablet Take 1 tablet (12.5 mg) by mouth daily as needed (edema) (Patient not taking: Reported on 4/25/2022) 30 tablet 5     hydrocortisone butyrate (LOCOID) 0.1 % SOLN solution Apply to scalp bid prn (Patient not taking: Reported on 4/25/2022) 60 mL 11     ketoconazole (NIZORAL) 2 % external shampoo Use every other day to scalp as needed 120 mL 11     levothyroxine (SYNTHROID/LEVOTHROID) 150 MCG tablet Take 1 tablet (150 mcg) by mouth daily 90 tablet 3     sulfamethoxazole-trimethoprim (BACTRIM DS) 800-160 MG tablet Take 1 pill orally on Mon, Wed and Friday 45 tablet 3     triamcinolone (KENALOG) 0.1 % external cream  (Patient not taking: Reported on 4/25/2022)       UNABLE TO FIND privigen inj every two months              Allergies:     Allergies   Allergen Reactions     Doxycycline             ROS:     A focused ROS is negative other than the symptoms noted above in the HPI.    Constitutional: denies any fevers, chills, any recent weight loss   Eyes: denies changes in visual acuity   Ears, Nose, Throat: denies any difficulty swallowing   Cardiovascular: denies any exertional chest pain or palpitation   Respiratory: denies dyspnea   Gastrointestinal: denies any nausea, vomiting, abdominal pain, diarrhea or constipation   Genitourinary: denies any dysuria, hematuria, frequency or urgency   Psychiatric: denies mood changes; sleeps OK. Uses CPAP intermittently.            Physical Examiniation:     Gen: NAD, pleasant and cooperative   HEENT: Atraumatic, normocephalic, extraocular movements appear intact  Pulm: non-labored breathing in room air  Neuro/MSK:   Orientation: Oriented to person, time, place and situation, Exhibits good insight into her condition and ongoing treatment  Motor: Observed moving upper extremities actively against gravity.           Laboratory/Imaging:     Infusion Therapy Visit on  05/05/2022   Component Date Value Ref Range Status     WBC Count 05/05/2022 3.5 (A) 4.0 - 11.0 10e3/uL Final     RBC Count 05/05/2022 3.34 (A) 3.80 - 5.20 10e6/uL Final     Hemoglobin 05/05/2022 8.8 (A) 11.7 - 15.7 g/dL Final     Hematocrit 05/05/2022 29.0 (A) 35.0 - 47.0 % Final     MCV 05/05/2022 87  78 - 100 fL Final     MCH 05/05/2022 26.3 (A) 26.5 - 33.0 pg Final     MCHC 05/05/2022 30.3 (A) 31.5 - 36.5 g/dL Final     RDW 05/05/2022 16.8 (A) 10.0 - 15.0 % Final     Platelet Count 05/05/2022 146 (A) 150 - 450 10e3/uL Final     % Neutrophils 05/05/2022 42  % Final     % Lymphocytes 05/05/2022 44  % Final     % Monocytes 05/05/2022 13  % Final     % Eosinophils 05/05/2022 1  % Final     % Basophils 05/05/2022 0  % Final     % Immature Granulocytes 05/05/2022 0  % Final     NRBCs per 100 WBC 05/05/2022 0  <1 /100 Final     Absolute Neutrophils 05/05/2022 1.5 (A) 1.6 - 8.3 10e3/uL Final     Absolute Lymphocytes 05/05/2022 1.5  0.8 - 5.3 10e3/uL Final     Absolute Monocytes 05/05/2022 0.4  0.0 - 1.3 10e3/uL Final     Absolute Eosinophils 05/05/2022 0.0  0.0 - 0.7 10e3/uL Final     Absolute Basophils 05/05/2022 0.0  0.0 - 0.2 10e3/uL Final     Absolute Immature Granulocytes 05/05/2022 0.0  <=0.4 10e3/uL Final     Absolute NRBCs 05/05/2022 0.0  10e3/uL Final       MRI thoracic and cervical spine 3/30/2022:  Multilevel degenerative changes  Small central disc protrusion at T5-6 minimally flattening the thoracic spinal cord.  No significant spinal canal or neural foraminal stenosis at any thoracic level.  No significant spinal cord canal or neuroforaminal stenosis.           Assessment/Plan:   Tricia Sosa is a 81 year old female with with autoimmune hemolytic anemia and IgA deficiency.  She presents to the clinic for evaluation of her rehabilitation needs.    Patient presents with generalized /proximal muscle weakness.  It started around the time when she was on steroids for her autoimmune hemolytic anemia.  She has  been off steroids since February 2022.  She continues to have generalized weakness, impaired balance and multiple falls. 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  Discussed with the patient that we will have her do outpatient physical therapy and occupational Therapy for 12 weeks.  Meanwhile, if her symptoms persist or get worse would consider doing EMG to rule out other etiologies for her weakness/decline.    1. Patient education: In depth discussion and education was provided about the assessment and implications of each of the below recommendations for management. Patient indicated readiness to learn, all questions were answered and understanding of material presented was confirmed.  2. Work-up: none at this time. If her balance and weakness is getting worse or not improving with therapies, may consider EMG to rule differential diagnosis.  3. Therapy/equipment/braces:     Placed order for quad cane to help with balance and prevents falls.     Placed PT referral     Placed OT referral   4. Referral / follow up with other providers: continue follow up with PCP and hematology/oncology as indicated.   5. Follow up: 3 months       Patient was staffed with Dr. Heart, who agrees with the assessment and plan.   Leny Kolb MD  PM&R resident  05/09/2022 11:30 AM    I, Marley Heart MD, saw this patient with the Resident and agree with the findings and plan of care as documented in the note.         Items personally reviewed/procedural attestation: vitals, labs and imaging and agree with the interpretation documented in the note.       Marley Heart MD   Physical Medicine & Rehabilitation           I appreciate the opportunity to participate in the care of your patient.     90 minutes spent on the date of the encounter doing chart review, history and exam, documentation and further activities as noted above.

## 2022-05-10 ENCOUNTER — VIRTUAL VISIT (OUTPATIENT)
Dept: ONCOLOGY | Facility: CLINIC | Age: 82
End: 2022-05-10
Attending: PHYSICIAN ASSISTANT
Payer: MEDICARE

## 2022-05-10 ENCOUNTER — PRE VISIT (OUTPATIENT)
Dept: ONCOLOGY | Facility: CLINIC | Age: 82
End: 2022-05-10

## 2022-05-10 ENCOUNTER — PATIENT OUTREACH (OUTPATIENT)
Dept: ONCOLOGY | Facility: CLINIC | Age: 82
End: 2022-05-10

## 2022-05-10 DIAGNOSIS — Z79.52 CURRENT CHRONIC USE OF SYSTEMIC STEROIDS: ICD-10-CM

## 2022-05-10 DIAGNOSIS — M62.81 GENERALIZED MUSCLE WEAKNESS: ICD-10-CM

## 2022-05-10 DIAGNOSIS — D59.10 AUTOIMMUNE HEMOLYTIC ANEMIA (H): Primary | ICD-10-CM

## 2022-05-10 PROCEDURE — G0463 HOSPITAL OUTPT CLINIC VISIT: HCPCS | Mod: PN,RTG | Performed by: STUDENT IN AN ORGANIZED HEALTH CARE EDUCATION/TRAINING PROGRAM

## 2022-05-10 PROCEDURE — 99205 OFFICE O/P NEW HI 60 MIN: CPT | Mod: 95 | Performed by: STUDENT IN AN ORGANIZED HEALTH CARE EDUCATION/TRAINING PROGRAM

## 2022-05-10 PROCEDURE — 99417 PROLNG OP E/M EACH 15 MIN: CPT | Mod: 95 | Performed by: STUDENT IN AN ORGANIZED HEALTH CARE EDUCATION/TRAINING PROGRAM

## 2022-05-10 NOTE — PROGRESS NOTES
Tricia called in to clinic asking if it is okay if she stops her Bactrim. She reports she has been taking Bactrim since 2008 and she thought Dr. Boyd talked about possibly discontinuing it in the future.     Per Dr. Boyd's last office visit note from 03/15/22, pt is on bactrim for prophylaxis when she is on prednisone.     Tricia reports she is due for a refill on her Bactrim but thought she would double check with Dr. Boyd before she refills it. Per office visit note from 04/12/22, she is off prednisone now.     Anabell Hawkins, RN, BSN, COLLEEN  RN Care Coordinator  [facility] [place]  [phone]

## 2022-05-10 NOTE — NURSING NOTE
Tricia Sosa verified meds and allergies are correct via patients echeck in. No changes at this time.    Aanbell Zuñiga, Virtual Facilitator

## 2022-05-10 NOTE — LETTER
5/10/2022         RE: Tricia Sosa  [id number]  [facility] [address]        Dear Colleague,    Thank you for referring your patient, Tricia Sosa, to the [facility]. Please see a copy of my visit note below.    Tricia is a 81 year old who is being evaluated via a billable video visit.      Tricia Sosa stated she is in the state of [place] for the visit today.    How would you like to obtain your AVS? MyChart  If the video visit is dropped, the invitation should be resent by: Send to e-mail at: [email or web address]  Will anyone else be joining your video visit? Yes, son      Video Start Time: 10:00 AM  Video-Visit Details    Type of service:  Video Visit    Video End Time:11:00 AM    Originating Location (pt. Location): Home    Distant Location (provider location):  [facility]     Platform used for Video Visit: Fany Zuñiga, Virtual Visit Facilitator               PM&R Clinic Note     Patient Name: Tricia Sosa : 1940 Medical Record: [id number]     Requesting Physician/clinician: Isaiah Man PA           History of Present Illness:     Tricia Sosa is a 81 year old female with with autoimmune hemolytic anemia and IgA deficiency.  She presents to the clinic for evaluation of her rehabilitation needs.    Hematology history obtained from Isaiah Man PA notes on 2022:  Tricia has been diagnosed with IgA deficiency since her around .  She was very sick at the time and had severe diarrhea.  She lost about 40 pounds in weight.  The workup revealed that she had markedly diminished CD4 counts of 48 and was diagnosed with CMV colitis.  Since then she has been followed in hematology clinic at [place] until recently.  She was noted to have anemia which was fairly long-standing.  She was initially referred for anemia in  and also had a bone marrow biopsy done at that time which revealed hypercellular bone  marrow with 70-80% cellularity and normal trilineage hematopoiesis and no morphologic features of MDS or lymphoproliferation.  Her anemia was attributed to her chronic underlying disease.  At the next evaluation in 2002 on 4 aggressive anemia there was no evidence of hemolysis, iron deficiency, B12 or folate deficiency.  Bone marrow biopsy was not pursued.  In summer of 2015 she had progressive fatigue, dyspnea on exertion and worsening anemia with a hemoglobin now of 9.  Workup at this time did suggest a hemolytic anemia with elevated LDH at 709, undetectable haptoglobin and elevated unconjugated bilirubin at 3.3.  Monospecific MARIKA was positive for both IgG and complement.  Cold agglutinin screen was positive with very low titer 1:64.  She was started on prednisone 60 mg daily with supplementation of iron folate and B12 starting 7/31/15.  Her prednisone has been slowly tapered and was discontinued off in January 2016.  She was last followed at [facility] on March 10, 2016 when she had stable hemoglobin.     She was followed by Dr. Matos and Dr. Summers.  Due to relapse of hemolytic anemia, she underwent another course of prednisone that has since been tapered off and rituximab x 4 weekly doses completed in 9/19/19.     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  She started weekly Rituxan on 7/31/20 x 4 doses, completed on 8/21/20.  She tapered off of prednisone in October 2020.     Due to relapse of her AIHA, she started prednisone again on 6/8/21 at 60 mg daily with slow taper and finished taper in August 2021.     Due to relapse of AIHA again, she started prednisone again on 11/2/21 at 70 mg daily with slow taper, completed early March 2022.  She also completed weekly Rituxuan again x 4 doses 12/6/21-12/29/21.     At her last visit they discussed splenectomy due to concern about poor response to steroids, but patient wanting to wait and consider  "additional rituximab when needed.       Symptoms,  She reports that she has been having Poor balance; she reports that he balance is \"terrible\". She reports also having generalized weakness overall her body. She reports that she Leaning forward to compensate for the impaired balance.  She reports that she uses single point cane. She had been on steroid for her autoimmune hemolytic anemia. She reports that she stopped steroid in 2/2022.  She has has been having falls over the past yea. She reports that the last fall was on  1/2022. She denies any falls or near falls since then.  She reports that she is able to catch herself. She reports that she has been using the chair lift to go upstairs. She denies any lower back pain. She reports localized neck pain with no radiation. She was seen at the neurology [place] for the impaired balance, unfortunately we do not have access to those records.    She reports she has had bilateral feet numbness over the plantar surface. She reports she had EMG done  for the numbness. She reports that she was diagnosed with peripheral neuropathy.     Therapies/HEP,last formal physical therapy was in 5/2021.     Functionally, she modified independent with mobility. She reports that performing activities of daily living has been challenging.            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, " and posterior vitreous detachment     Urinary tract infection, site not specified     Recurrent UTI's     Past Surgical History:   Procedure Laterality Date     COLONOSCOPY N/A 2016    Procedure: COLONOSCOPY;  Surgeon: Dontae Del Rio MD;  Location:  GI     ENT SURGERY      Thyroid lobectomy     EYE SURGERY Bilateral 2021    Cataract Surgery     HC CYSTOSCOPY,INSERT URETERAL STENT      Ureteral stent insertion     HC FLEX SIGMOIDOSCOPY W BIOPSY  00     HC LAPAROSCOPY, SURGICAL; CHOLECYSTECTOMY        THYROIDECTOMY       LIGATION OF HEMORRHOID(S)      Hemorrhoidectomy     UPPER GI ENDOSCOPY,BIOPSY  10/01/01     ZZC LIGATE FALLOPIAN TUBE       ZZHC COLONOSCOPY W BIOPSY  00     ZZHC COLONOSCOPY W BIOPSY  10/8/03     ZZHC COLONOSCOPY W BIOPSY  05    REPEAT IN 5 YEARS            Social History:     Social History     Tobacco Use     Smoking status: Never Smoker     Smokeless tobacco: Never Used   Substance Use Topics     Alcohol use: Yes     Alcohol/week: 0.0 standard drinks     Comment: 1 a day at most       Marital Status:     Living situation: she lives with son and daughter in law.   Family support: available   Vocational History: office work            Functional history:     Tricia Sosa is modified independent with all aspects of her life.    ADLs: it is difficult to do activities of daily living.   Assistive devices: single point cane   iADLs (medication management and finances): independent   Driving: no         Family History:     Family History   Problem Relation Age of Onset     Colon Cancer Mother 90     Cerebrovascular Disease Father          at age 85     Dementia Brother      Prostate Cancer Brother      Thyroid Disease Brother      Dementia Brother      Thyroid Disease Brother      Pancreatic Cancer Brother      Breast Cancer Sister      Hyperlipidemia Sister      Depression Sister      Anxiety Disorder Sister      Thyroid Disease Sister      Breast  Cancer Sister      Colon Cancer Niece      Other Cancer Niece         leukemia            Medications:     Current Outpatient Medications   Medication Sig Dispense Refill     cyanocobalamin (VITAMIN  B-12) 1000 MCG tablet   3     Flaxseed, Linseed, (FLAX SEEDS PO) Take by mouth daily       folic acid (FOLVITE) 1 MG tablet   3     hydrochlorothiazide (HYDRODIURIL) 12.5 MG tablet Take 1 tablet (12.5 mg) by mouth daily as needed (edema) (Patient not taking: Reported on 4/25/2022) 30 tablet 5     hydrocortisone butyrate (LOCOID) 0.1 % SOLN solution Apply to scalp bid prn (Patient not taking: Reported on 4/25/2022) 60 mL 11     ketoconazole (NIZORAL) 2 % external shampoo Use every other day to scalp as needed 120 mL 11     levothyroxine (SYNTHROID/LEVOTHROID) 150 MCG tablet Take 1 tablet (150 mcg) by mouth daily 90 tablet 3     sulfamethoxazole-trimethoprim (BACTRIM DS) 800-160 MG tablet Take 1 pill orally on Mon, Wed and Friday 45 tablet 3     triamcinolone (KENALOG) 0.1 % external cream  (Patient not taking: Reported on 4/25/2022)       UNABLE TO FIND privigen inj every two months              Allergies:     Allergies   Allergen Reactions     Doxycycline             ROS:     A focused ROS is negative other than the symptoms noted above in the HPI.    Constitutional: denies any fevers, chills, any recent weight loss   Eyes: denies changes in visual acuity   Ears, Nose, Throat: denies any difficulty swallowing   Cardiovascular: denies any exertional chest pain or palpitation   Respiratory: denies dyspnea   Gastrointestinal: denies any nausea, vomiting, abdominal pain, diarrhea or constipation   Genitourinary: denies any dysuria, hematuria, frequency or urgency   Psychiatric: denies mood changes; sleeps OK. Uses CPAP intermittently.            Physical Examiniation:     Gen: NAD, pleasant and cooperative   HEENT: Atraumatic, normocephalic, extraocular movements appear intact  Pulm: non-labored breathing in room  air  Neuro/MSK:   Orientation: Oriented to person, time, place and situation, Exhibits good insight into her condition and ongoing treatment  Motor: Observed moving upper extremities actively against gravity.           Laboratory/Imaging:     Infusion Therapy Visit on 05/05/2022   Component Date Value Ref Range Status     WBC Count 05/05/2022 3.5 (A) 4.0 - 11.0 10e3/uL Final     RBC Count 05/05/2022 3.34 (A) 3.80 - 5.20 10e6/uL Final     Hemoglobin 05/05/2022 8.8 (A) 11.7 - 15.7 g/dL Final     Hematocrit 05/05/2022 29.0 (A) 35.0 - 47.0 % Final     MCV 05/05/2022 87  78 - 100 fL Final     MCH 05/05/2022 26.3 (A) 26.5 - 33.0 pg Final     MCHC 05/05/2022 30.3 (A) 31.5 - 36.5 g/dL Final     RDW 05/05/2022 16.8 (A) 10.0 - 15.0 % Final     Platelet Count 05/05/2022 146 (A) 150 - 450 10e3/uL Final     % Neutrophils 05/05/2022 42  % Final     % Lymphocytes 05/05/2022 44  % Final     % Monocytes 05/05/2022 13  % Final     % Eosinophils 05/05/2022 1  % Final     % Basophils 05/05/2022 0  % Final     % Immature Granulocytes 05/05/2022 0  % Final     NRBCs per 100 WBC 05/05/2022 0  <1 /100 Final     Absolute Neutrophils 05/05/2022 1.5 (A) 1.6 - 8.3 10e3/uL Final     Absolute Lymphocytes 05/05/2022 1.5  0.8 - 5.3 10e3/uL Final     Absolute Monocytes 05/05/2022 0.4  0.0 - 1.3 10e3/uL Final     Absolute Eosinophils 05/05/2022 0.0  0.0 - 0.7 10e3/uL Final     Absolute Basophils 05/05/2022 0.0  0.0 - 0.2 10e3/uL Final     Absolute Immature Granulocytes 05/05/2022 0.0  <=0.4 10e3/uL Final     Absolute NRBCs 05/05/2022 0.0  10e3/uL Final       MRI thoracic and cervical spine 3/30/2022:  Multilevel degenerative changes  Small central disc protrusion at T5-6 minimally flattening the thoracic spinal cord.  No significant spinal canal or neural foraminal stenosis at any thoracic level.  No significant spinal cord canal or neuroforaminal stenosis.           Assessment/Plan:   Tricia Sosa is a 81 year old female with with autoimmune  hemolytic anemia and IgA deficiency.  She presents to the clinic for evaluation of her rehabilitation needs.    Patient presents with generalized /proximal muscle weakness.  It started around the time when she was on steroids for her autoimmune hemolytic anemia.  She has been off steroids since February 2022.  She continues to have generalized weakness, impaired balance and multiple falls. 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  Discussed with the patient that we will have her do outpatient physical therapy and occupational Therapy for 12 weeks.  Meanwhile, if her symptoms persist or get worse would consider doing EMG to rule out other etiologies for her weakness/decline.    1. Patient education: In depth discussion and education was provided about the assessment and implications of each of the below recommendations for management. Patient indicated readiness to learn, all questions were answered and understanding of material presented was confirmed.  2. Work-up: none at this time. If her balance and weakness is getting worse or not improving with therapies, may consider EMG to rule differential diagnosis.  3. Therapy/equipment/braces:     Placed order for quad cane to help with balance and prevents falls.     Placed PT referral     Placed OT referral   4. Referral / follow up with other providers: continue follow up with PCP and hematology/oncology as indicated.   5. Follow up: 3 months       Patient was staffed with Dr. Heart, who agrees with the assessment and plan.   Leny Kolb MD  PM&R resident  05/09/2022 11:30 AM      Marley Heart MD  Physical Medicine & Rehabilitation    I appreciate the opportunity to participate in the care of your patient.     90 minutes spent on the date of the encounter doing chart review, history and exam, documentation and further activities as noted above.                Again, thank you for allowing me to participate  in the care of your patient.        Sincerely,        Marley Heart MD

## 2022-05-10 NOTE — PATIENT INSTRUCTIONS
A physical therapy and occupational therapy referral was placed to help with your balance and strength.  A quad cane was ordered to help with stability when you walk.  Return to clinic with Dr. Heart in 3 months for follow up.

## 2022-05-10 NOTE — LETTER
5/10/2022         RE: Tricia Sosa  [id number]  [facility] [address]        Dear Colleague,    Thank you for referring your patient, Tricia Sosa, to the [facility]. Please see a copy of my visit note below.    Tricia is a 81 year old who is being evaluated via a billable video visit.      Tricia Sosa stated she is in the state of [place] for the visit today.    How would you like to obtain your AVS? MyChart  If the video visit is dropped, the invitation should be resent by: Send to e-mail at: [email or web address]  Will anyone else be joining your video visit? Yes, son      Video Start Time: 10:00 AM  Video-Visit Details    Type of service:  Video Visit    Video End Time:11:00 AM    Originating Location (pt. Location): Home    Distant Location (provider location):  [facility]     Platform used for Video Visit: Fany Zuñiga, Virtual Visit Facilitator               PM&R Clinic Note     Patient Name: Tricia Sosa : 1940 Medical Record: [id number]     Requesting Physician/clinician: Isaiah Man PA           History of Present Illness:     Tricia Sosa is a 81 year old female with with autoimmune hemolytic anemia and IgA deficiency.  She presents to the clinic for evaluation of her rehabilitation needs.    Hematology history obtained from Isaiah Man PA notes on 2022:  Tricia has been diagnosed with IgA deficiency since her around .  She was very sick at the time and had severe diarrhea.  She lost about 40 pounds in weight.  The workup revealed that she had markedly diminished CD4 counts of 48 and was diagnosed with CMV colitis.  Since then she has been followed in hematology clinic at [facility] until recently.  She was noted to have anemia which was fairly long-standing.  She was initially referred for anemia in  and also had a bone marrow biopsy done at that time which revealed hypercellular bone  marrow with 70-80% cellularity and normal trilineage hematopoiesis and no morphologic features of MDS or lymphoproliferation.  Her anemia was attributed to her chronic underlying disease.  At the next evaluation in 2002 on 4 aggressive anemia there was no evidence of hemolysis, iron deficiency, B12 or folate deficiency.  Bone marrow biopsy was not pursued.  In summer of 2015 she had progressive fatigue, dyspnea on exertion and worsening anemia with a hemoglobin now of 9.  Workup at this time did suggest a hemolytic anemia with elevated LDH at 709, undetectable haptoglobin and elevated unconjugated bilirubin at 3.3.  Monospecific MARIKA was positive for both IgG and complement.  Cold agglutinin screen was positive with very low titer 1:64.  She was started on prednisone 60 mg daily with supplementation of iron folate and B12 starting 7/31/15.  Her prednisone has been slowly tapered and was discontinued off in January 2016.  She was last followed at [facility] on March 10, 2016 when she had stable hemoglobin.     She was followed by Dr. Matos and Dr. Summers.  Due to relapse of hemolytic anemia, she underwent another course of prednisone that has since been tapered off and rituximab x 4 weekly doses completed in 9/19/19.     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  She started weekly Rituxan on 7/31/20 x 4 doses, completed on 8/21/20.  She tapered off of prednisone in October 2020.     Due to relapse of her AIHA, she started prednisone again on 6/8/21 at 60 mg daily with slow taper and finished taper in August 2021.     Due to relapse of AIHA again, she started prednisone again on 11/2/21 at 70 mg daily with slow taper, completed early March 2022.  She also completed weekly Rituxuan again x 4 doses 12/6/21-12/29/21.     At her last visit they discussed splenectomy due to concern about poor response to steroids, but patient wanting to wait and consider  "additional rituximab when needed.       Symptoms,  She reports that she has been having Poor balance; she reports that he balance is \"terrible\". She reports also having generalized weakness overall her body. She reports that she Leaning forward to compensate for the impaired balance.  She reports that she uses single point cane. She had been on steroid for her autoimmune hemolytic anemia. She reports that she stopped steroid in 2/2022.  She has has been having falls over the past yea. She reports that the last fall was on  1/2022. She denies any falls or near falls since then.  She reports that she is able to catch herself. She reports that she has been using the chair lift to go upstairs. She denies any lower back pain. She reports localized neck pain with no radiation. She was seen at the neurology [place] for the impaired balance, unfortunately we do not have access to those records.    She reports she has had bilateral feet numbness over the plantar surface. She reports she had EMG done  for the numbness. She reports that she was diagnosed with peripheral neuropathy.     Therapies/HEP,last formal physical therapy was in 5/2021.     Functionally, she modified independent with mobility. She reports that performing activities of daily living has been challenging.            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, " and posterior vitreous detachment     Urinary tract infection, site not specified     Recurrent UTI's     Past Surgical History:   Procedure Laterality Date     COLONOSCOPY N/A 2016    Procedure: COLONOSCOPY;  Surgeon: Dontae Del Rio MD;  Location:  GI     ENT SURGERY      Thyroid lobectomy     EYE SURGERY Bilateral 2021    Cataract Surgery     HC CYSTOSCOPY,INSERT URETERAL STENT      Ureteral stent insertion     HC FLEX SIGMOIDOSCOPY W BIOPSY  00     HC LAPAROSCOPY, SURGICAL; CHOLECYSTECTOMY        THYROIDECTOMY       LIGATION OF HEMORRHOID(S)      Hemorrhoidectomy     UPPER GI ENDOSCOPY,BIOPSY  10/01/01     ZZC LIGATE FALLOPIAN TUBE       ZZHC COLONOSCOPY W BIOPSY  00     ZZHC COLONOSCOPY W BIOPSY  10/8/03     ZZHC COLONOSCOPY W BIOPSY  05    REPEAT IN 5 YEARS            Social History:     Social History     Tobacco Use     Smoking status: Never Smoker     Smokeless tobacco: Never Used   Substance Use Topics     Alcohol use: Yes     Alcohol/week: 0.0 standard drinks     Comment: 1 a day at most       Marital Status:     Living situation: she lives with son and daughter in law.   Family support: available   Vocational History: office work            Functional history:     Tricia Sosa is modified independent with all aspects of her life.    ADLs: it is difficult to do activities of daily living.   Assistive devices: single point cane   iADLs (medication management and finances): independent   Driving: no         Family History:     Family History   Problem Relation Age of Onset     Colon Cancer Mother 90     Cerebrovascular Disease Father          at age 85     Dementia Brother      Prostate Cancer Brother      Thyroid Disease Brother      Dementia Brother      Thyroid Disease Brother      Pancreatic Cancer Brother      Breast Cancer Sister      Hyperlipidemia Sister      Depression Sister      Anxiety Disorder Sister      Thyroid Disease Sister      Breast  Cancer Sister      Colon Cancer Niece      Other Cancer Niece         leukemia            Medications:     Current Outpatient Medications   Medication Sig Dispense Refill     cyanocobalamin (VITAMIN  B-12) 1000 MCG tablet   3     Flaxseed, Linseed, (FLAX SEEDS PO) Take by mouth daily       folic acid (FOLVITE) 1 MG tablet   3     hydrochlorothiazide (HYDRODIURIL) 12.5 MG tablet Take 1 tablet (12.5 mg) by mouth daily as needed (edema) (Patient not taking: Reported on 4/25/2022) 30 tablet 5     hydrocortisone butyrate (LOCOID) 0.1 % SOLN solution Apply to scalp bid prn (Patient not taking: Reported on 4/25/2022) 60 mL 11     ketoconazole (NIZORAL) 2 % external shampoo Use every other day to scalp as needed 120 mL 11     levothyroxine (SYNTHROID/LEVOTHROID) 150 MCG tablet Take 1 tablet (150 mcg) by mouth daily 90 tablet 3     sulfamethoxazole-trimethoprim (BACTRIM DS) 800-160 MG tablet Take 1 pill orally on Mon, Wed and Friday 45 tablet 3     triamcinolone (KENALOG) 0.1 % external cream  (Patient not taking: Reported on 4/25/2022)       UNABLE TO FIND privigen inj every two months              Allergies:     Allergies   Allergen Reactions     Doxycycline             ROS:     A focused ROS is negative other than the symptoms noted above in the HPI.    Constitutional: denies any fevers, chills, any recent weight loss   Eyes: denies changes in visual acuity   Ears, Nose, Throat: denies any difficulty swallowing   Cardiovascular: denies any exertional chest pain or palpitation   Respiratory: denies dyspnea   Gastrointestinal: denies any nausea, vomiting, abdominal pain, diarrhea or constipation   Genitourinary: denies any dysuria, hematuria, frequency or urgency   Psychiatric: denies mood changes; sleeps OK. Uses CPAP intermittently.            Physical Examiniation:     Gen: NAD, pleasant and cooperative   HEENT: Atraumatic, normocephalic, extraocular movements appear intact  Pulm: non-labored breathing in room  air  Neuro/MSK:   Orientation: Oriented to person, time, place and situation, Exhibits good insight into her condition and ongoing treatment  Motor: Observed moving upper extremities actively against gravity.           Laboratory/Imaging:     Infusion Therapy Visit on 05/05/2022   Component Date Value Ref Range Status     WBC Count 05/05/2022 3.5 (A) 4.0 - 11.0 10e3/uL Final     RBC Count 05/05/2022 3.34 (A) 3.80 - 5.20 10e6/uL Final     Hemoglobin 05/05/2022 8.8 (A) 11.7 - 15.7 g/dL Final     Hematocrit 05/05/2022 29.0 (A) 35.0 - 47.0 % Final     MCV 05/05/2022 87  78 - 100 fL Final     MCH 05/05/2022 26.3 (A) 26.5 - 33.0 pg Final     MCHC 05/05/2022 30.3 (A) 31.5 - 36.5 g/dL Final     RDW 05/05/2022 16.8 (A) 10.0 - 15.0 % Final     Platelet Count 05/05/2022 146 (A) 150 - 450 10e3/uL Final     % Neutrophils 05/05/2022 42  % Final     % Lymphocytes 05/05/2022 44  % Final     % Monocytes 05/05/2022 13  % Final     % Eosinophils 05/05/2022 1  % Final     % Basophils 05/05/2022 0  % Final     % Immature Granulocytes 05/05/2022 0  % Final     NRBCs per 100 WBC 05/05/2022 0  <1 /100 Final     Absolute Neutrophils 05/05/2022 1.5 (A) 1.6 - 8.3 10e3/uL Final     Absolute Lymphocytes 05/05/2022 1.5  0.8 - 5.3 10e3/uL Final     Absolute Monocytes 05/05/2022 0.4  0.0 - 1.3 10e3/uL Final     Absolute Eosinophils 05/05/2022 0.0  0.0 - 0.7 10e3/uL Final     Absolute Basophils 05/05/2022 0.0  0.0 - 0.2 10e3/uL Final     Absolute Immature Granulocytes 05/05/2022 0.0  <=0.4 10e3/uL Final     Absolute NRBCs 05/05/2022 0.0  10e3/uL Final       MRI thoracic and cervical spine 3/30/2022:  Multilevel degenerative changes  Small central disc protrusion at T5-6 minimally flattening the thoracic spinal cord.  No significant spinal canal or neural foraminal stenosis at any thoracic level.  No significant spinal cord canal or neuroforaminal stenosis.           Assessment/Plan:   Tricia Sosa is a 81 year old female with with autoimmune  hemolytic anemia and IgA deficiency.  She presents to the clinic for evaluation of her rehabilitation needs.    Patient presents with generalized /proximal muscle weakness.  It started around the time when she was on steroids for her autoimmune hemolytic anemia.  She has been off steroids since February 2022.  She continues to have generalized weakness, impaired balance and multiple falls. 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  Discussed with the patient that we will have her do outpatient physical therapy and occupational Therapy for 12 weeks.  Meanwhile, if her symptoms persist or get worse would consider doing EMG to rule out other etiologies for her weakness/decline.    1. Patient education: In depth discussion and education was provided about the assessment and implications of each of the below recommendations for management. Patient indicated readiness to learn, all questions were answered and understanding of material presented was confirmed.  2. Work-up: none at this time. If her balance and weakness is getting worse or not improving with therapies, may consider EMG to rule differential diagnosis.  3. Therapy/equipment/braces:     Placed order for quad cane to help with balance and prevents falls.     Placed PT referral     Placed OT referral   4. Referral / follow up with other providers: continue follow up with PCP and hematology/oncology as indicated.   5. Follow up: 3 months       Patient was staffed with Dr. Heart, who agrees with the assessment and plan.   Leny Kolb MD  PM&R resident  05/09/2022 11:30 AM      Marley Heart MD  Physical Medicine & Rehabilitation    I appreciate the opportunity to participate in the care of your patient.     90 minutes spent on the date of the encounter doing chart review, history and exam, documentation and further activities as noted above.                Again, thank you for allowing me to participate  in the care of your patient.        Sincerely,        Marley Heart MD

## 2022-05-11 NOTE — PROGRESS NOTES
Sabina Boyd MD You 55 minutes ago (9:11 AM)     FELICIA    She will need Bactrim in the future if she is on prednisone again.    Message text       You  Sabina Boyd MD 57 minutes ago (9:09 AM)     AA    Ok just to clarify, she will only need it if she is on prednisone in the future again? Or can she stop the Bactrim for good for now?     Thanks!    Message text       Sabina Boyd MD  You;  Cancer Clinic Rn Pool 18 hours ago (3:50 PM)     FELICIA    She  can stop it if she  is not on prednisone.    Message text       _____________________________________________________      Writer called Tricia to discuss Dr. Boyd's recommendation for stopping her Bactrim while she is off prednisone. Tricia verbalized understanding and is in agreement with the plan. She reports she has been off prednisone and has 3 refills left of her bactrim until Nov 2022 if she has to start another course of prednisone. She had no further questions or concerns at this time.     Anabell Hawkins, RN, BSN, COLLEEN  RN Care Coordinator  [facility]  [phone]

## 2022-05-12 ENCOUNTER — INFUSION THERAPY VISIT (OUTPATIENT)
Dept: INFUSION THERAPY | Facility: CLINIC | Age: 82
End: 2022-05-12
Attending: PHYSICIAN ASSISTANT
Payer: MEDICARE

## 2022-05-12 VITALS
RESPIRATION RATE: 16 BRPM | HEART RATE: 74 BPM | TEMPERATURE: 98.1 F | DIASTOLIC BLOOD PRESSURE: 63 MMHG | SYSTOLIC BLOOD PRESSURE: 127 MMHG | OXYGEN SATURATION: 96 % | BODY MASS INDEX: 21.77 KG/M2 | WEIGHT: 160.5 LBS

## 2022-05-12 DIAGNOSIS — D83.9 CVID (COMMON VARIABLE IMMUNODEFICIENCY) (H): ICD-10-CM

## 2022-05-12 DIAGNOSIS — D59.19 OTHER AUTOIMMUNE HEMOLYTIC ANEMIA (H): Primary | ICD-10-CM

## 2022-05-12 DIAGNOSIS — D50.9 IRON DEFICIENCY ANEMIA, UNSPECIFIED IRON DEFICIENCY ANEMIA TYPE: Primary | ICD-10-CM

## 2022-05-12 DIAGNOSIS — D59.19 OTHER AUTOIMMUNE HEMOLYTIC ANEMIA (H): ICD-10-CM

## 2022-05-12 LAB
ALBUMIN SERPL-MCNC: 3.6 G/DL (ref 3.4–5)
ALP SERPL-CCNC: 102 U/L (ref 40–150)
ALT SERPL W P-5'-P-CCNC: 18 U/L (ref 0–50)
ANION GAP SERPL CALCULATED.3IONS-SCNC: 4 MMOL/L (ref 3–14)
AST SERPL W P-5'-P-CCNC: 26 U/L (ref 0–45)
BASOPHILS # BLD AUTO: 0 10E3/UL (ref 0–0.2)
BASOPHILS NFR BLD AUTO: 0 %
BILIRUB SERPL-MCNC: 0.8 MG/DL (ref 0.2–1.3)
BUN SERPL-MCNC: 16 MG/DL (ref 7–30)
CALCIUM SERPL-MCNC: 8.1 MG/DL (ref 8.5–10.1)
CHLORIDE BLD-SCNC: 109 MMOL/L (ref 94–109)
CO2 SERPL-SCNC: 25 MMOL/L (ref 20–32)
CREAT SERPL-MCNC: 0.55 MG/DL (ref 0.52–1.04)
EOSINOPHIL # BLD AUTO: 0 10E3/UL (ref 0–0.7)
EOSINOPHIL NFR BLD AUTO: 1 %
ERYTHROCYTE [DISTWIDTH] IN BLOOD BY AUTOMATED COUNT: 19 % (ref 10–15)
GFR SERPL CREATININE-BSD FRML MDRD: >90 ML/MIN/1.73M2
GLUCOSE BLD-MCNC: 140 MG/DL (ref 70–99)
HCT VFR BLD AUTO: 29.5 % (ref 35–47)
HGB BLD-MCNC: 8.8 G/DL (ref 11.7–15.7)
IMM GRANULOCYTES # BLD: 0 10E3/UL
IMM GRANULOCYTES NFR BLD: 1 %
LDH SERPL L TO P-CCNC: 197 U/L (ref 81–234)
LYMPHOCYTES # BLD AUTO: 1.3 10E3/UL (ref 0.8–5.3)
LYMPHOCYTES NFR BLD AUTO: 40 %
MCH RBC QN AUTO: 27.1 PG (ref 26.5–33)
MCHC RBC AUTO-ENTMCNC: 29.8 G/DL (ref 31.5–36.5)
MCV RBC AUTO: 91 FL (ref 78–100)
MONOCYTES # BLD AUTO: 0.4 10E3/UL (ref 0–1.3)
MONOCYTES NFR BLD AUTO: 13 %
NEUTROPHILS # BLD AUTO: 1.5 10E3/UL (ref 1.6–8.3)
NEUTROPHILS NFR BLD AUTO: 45 %
NRBC # BLD AUTO: 0 10E3/UL
NRBC BLD AUTO-RTO: 0 /100
PLATELET # BLD AUTO: 131 10E3/UL (ref 150–450)
POTASSIUM BLD-SCNC: 3.8 MMOL/L (ref 3.4–5.3)
PROT SERPL-MCNC: 6.1 G/DL (ref 6.8–8.8)
RBC # BLD AUTO: 3.25 10E6/UL (ref 3.8–5.2)
RETICS # AUTO: 0.14 10E6/UL (ref 0.03–0.1)
RETICS/RBC NFR AUTO: 4.4 % (ref 0.5–2)
SODIUM SERPL-SCNC: 138 MMOL/L (ref 133–144)
WBC # BLD AUTO: 3.3 10E3/UL (ref 4–11)

## 2022-05-12 PROCEDURE — 85025 COMPLETE CBC W/AUTO DIFF WBC: CPT | Performed by: INTERNAL MEDICINE

## 2022-05-12 PROCEDURE — 85045 AUTOMATED RETICULOCYTE COUNT: CPT | Performed by: INTERNAL MEDICINE

## 2022-05-12 PROCEDURE — 96413 CHEMO IV INFUSION 1 HR: CPT

## 2022-05-12 PROCEDURE — 96366 THER/PROPH/DIAG IV INF ADDON: CPT

## 2022-05-12 PROCEDURE — 250N000011 HC RX IP 250 OP 636: Performed by: PHYSICIAN ASSISTANT

## 2022-05-12 PROCEDURE — 80053 COMPREHEN METABOLIC PANEL: CPT | Performed by: INTERNAL MEDICINE

## 2022-05-12 PROCEDURE — 250N000011 HC RX IP 250 OP 636: Performed by: INTERNAL MEDICINE

## 2022-05-12 PROCEDURE — 83615 LACTATE (LD) (LDH) ENZYME: CPT | Performed by: INTERNAL MEDICINE

## 2022-05-12 PROCEDURE — 258N000003 HC RX IP 258 OP 636: Performed by: INTERNAL MEDICINE

## 2022-05-12 PROCEDURE — 250N000013 HC RX MED GY IP 250 OP 250 PS 637: Performed by: INTERNAL MEDICINE

## 2022-05-12 PROCEDURE — 83010 ASSAY OF HAPTOGLOBIN QUANT: CPT | Performed by: INTERNAL MEDICINE

## 2022-05-12 PROCEDURE — 258N000003 HC RX IP 258 OP 636: Performed by: PHYSICIAN ASSISTANT

## 2022-05-12 PROCEDURE — 36415 COLL VENOUS BLD VENIPUNCTURE: CPT

## 2022-05-12 PROCEDURE — 96367 TX/PROPH/DG ADDL SEQ IV INF: CPT

## 2022-05-12 PROCEDURE — 82784 ASSAY IGA/IGD/IGG/IGM EACH: CPT | Performed by: INTERNAL MEDICINE

## 2022-05-12 RX ORDER — ALBUTEROL SULFATE 90 UG/1
1-2 AEROSOL, METERED RESPIRATORY (INHALATION)
Status: CANCELLED
Start: 2022-05-12

## 2022-05-12 RX ORDER — ALBUTEROL SULFATE 90 UG/1
1-2 AEROSOL, METERED RESPIRATORY (INHALATION)
Status: CANCELLED
Start: 2022-05-23

## 2022-05-12 RX ORDER — LORAZEPAM 2 MG/ML
0.5 INJECTION INTRAMUSCULAR EVERY 4 HOURS PRN
Status: CANCELLED
Start: 2022-05-19

## 2022-05-12 RX ORDER — HEPARIN SODIUM,PORCINE 10 UNIT/ML
5 VIAL (ML) INTRAVENOUS
Status: CANCELLED | OUTPATIENT
Start: 2022-05-14

## 2022-05-12 RX ORDER — DIPHENHYDRAMINE HYDROCHLORIDE 50 MG/ML
50 INJECTION INTRAMUSCULAR; INTRAVENOUS
Status: CANCELLED
Start: 2022-05-19

## 2022-05-12 RX ORDER — DIPHENHYDRAMINE HCL 25 MG
50 CAPSULE ORAL ONCE
Status: CANCELLED
Start: 2022-05-23

## 2022-05-12 RX ORDER — HEPARIN SODIUM (PORCINE) LOCK FLUSH IV SOLN 100 UNIT/ML 100 UNIT/ML
5 SOLUTION INTRAVENOUS
Status: CANCELLED | OUTPATIENT
Start: 2022-05-23

## 2022-05-12 RX ORDER — MEPERIDINE HYDROCHLORIDE 25 MG/ML
25 INJECTION INTRAMUSCULAR; INTRAVENOUS; SUBCUTANEOUS EVERY 30 MIN PRN
Status: CANCELLED | OUTPATIENT
Start: 2022-05-19

## 2022-05-12 RX ORDER — METHYLPREDNISOLONE SODIUM SUCCINATE 125 MG/2ML
125 INJECTION, POWDER, LYOPHILIZED, FOR SOLUTION INTRAMUSCULAR; INTRAVENOUS
Status: CANCELLED
Start: 2022-05-14

## 2022-05-12 RX ORDER — ACETAMINOPHEN 325 MG/1
650 TABLET ORAL ONCE
Status: CANCELLED
Start: 2022-05-23

## 2022-05-12 RX ORDER — NALOXONE HYDROCHLORIDE 0.4 MG/ML
0.2 INJECTION, SOLUTION INTRAMUSCULAR; INTRAVENOUS; SUBCUTANEOUS
Status: CANCELLED | OUTPATIENT
Start: 2022-05-14

## 2022-05-12 RX ORDER — NALOXONE HYDROCHLORIDE 0.4 MG/ML
0.2 INJECTION, SOLUTION INTRAMUSCULAR; INTRAVENOUS; SUBCUTANEOUS
Status: CANCELLED | OUTPATIENT
Start: 2022-05-23

## 2022-05-12 RX ORDER — ACETAMINOPHEN 325 MG/1
650 TABLET ORAL ONCE
Status: COMPLETED | OUTPATIENT
Start: 2022-05-12 | End: 2022-05-12

## 2022-05-12 RX ORDER — HEPARIN SODIUM,PORCINE 10 UNIT/ML
5 VIAL (ML) INTRAVENOUS
Status: CANCELLED | OUTPATIENT
Start: 2022-05-12

## 2022-05-12 RX ORDER — ALBUTEROL SULFATE 90 UG/1
1-2 AEROSOL, METERED RESPIRATORY (INHALATION)
Status: CANCELLED
Start: 2022-05-19

## 2022-05-12 RX ORDER — MEPERIDINE HYDROCHLORIDE 25 MG/ML
25 INJECTION INTRAMUSCULAR; INTRAVENOUS; SUBCUTANEOUS
Status: CANCELLED | OUTPATIENT
Start: 2022-05-19

## 2022-05-12 RX ORDER — ACETAMINOPHEN 325 MG/1
650 TABLET ORAL ONCE
Status: CANCELLED | OUTPATIENT
Start: 2022-05-12

## 2022-05-12 RX ORDER — DIPHENHYDRAMINE HYDROCHLORIDE 50 MG/ML
50 INJECTION INTRAMUSCULAR; INTRAVENOUS
Status: CANCELLED
Start: 2022-05-12

## 2022-05-12 RX ORDER — NALOXONE HYDROCHLORIDE 0.4 MG/ML
0.2 INJECTION, SOLUTION INTRAMUSCULAR; INTRAVENOUS; SUBCUTANEOUS
Status: CANCELLED | OUTPATIENT
Start: 2022-05-12

## 2022-05-12 RX ORDER — MEPERIDINE HYDROCHLORIDE 25 MG/ML
25 INJECTION INTRAMUSCULAR; INTRAVENOUS; SUBCUTANEOUS EVERY 30 MIN PRN
Status: CANCELLED | OUTPATIENT
Start: 2022-05-23

## 2022-05-12 RX ORDER — DIPHENHYDRAMINE HYDROCHLORIDE 50 MG/ML
50 INJECTION INTRAMUSCULAR; INTRAVENOUS
Status: CANCELLED
Start: 2022-05-14

## 2022-05-12 RX ORDER — ALBUTEROL SULFATE 90 UG/1
1-2 AEROSOL, METERED RESPIRATORY (INHALATION)
Status: CANCELLED
Start: 2022-05-14

## 2022-05-12 RX ORDER — METHYLPREDNISOLONE SODIUM SUCCINATE 125 MG/2ML
125 INJECTION, POWDER, LYOPHILIZED, FOR SOLUTION INTRAMUSCULAR; INTRAVENOUS
Status: CANCELLED | OUTPATIENT
Start: 2022-05-19

## 2022-05-12 RX ORDER — EPINEPHRINE 1 MG/ML
0.3 INJECTION, SOLUTION INTRAMUSCULAR; SUBCUTANEOUS EVERY 5 MIN PRN
Status: CANCELLED | OUTPATIENT
Start: 2022-05-23

## 2022-05-12 RX ORDER — METHYLPREDNISOLONE SODIUM SUCCINATE 125 MG/2ML
125 INJECTION, POWDER, LYOPHILIZED, FOR SOLUTION INTRAMUSCULAR; INTRAVENOUS
Status: CANCELLED
Start: 2022-05-23

## 2022-05-12 RX ORDER — METHYLPREDNISOLONE SODIUM SUCCINATE 125 MG/2ML
125 INJECTION, POWDER, LYOPHILIZED, FOR SOLUTION INTRAMUSCULAR; INTRAVENOUS
Status: CANCELLED
Start: 2022-05-12

## 2022-05-12 RX ORDER — EPINEPHRINE 1 MG/ML
0.3 INJECTION, SOLUTION INTRAMUSCULAR; SUBCUTANEOUS EVERY 5 MIN PRN
Status: CANCELLED | OUTPATIENT
Start: 2022-05-14

## 2022-05-12 RX ORDER — HEPARIN SODIUM (PORCINE) LOCK FLUSH IV SOLN 100 UNIT/ML 100 UNIT/ML
5 SOLUTION INTRAVENOUS
Status: CANCELLED | OUTPATIENT
Start: 2022-05-19

## 2022-05-12 RX ORDER — EPINEPHRINE 1 MG/ML
0.3 INJECTION, SOLUTION INTRAMUSCULAR; SUBCUTANEOUS EVERY 5 MIN PRN
Status: CANCELLED | OUTPATIENT
Start: 2022-05-19

## 2022-05-12 RX ORDER — DIPHENHYDRAMINE HCL 25 MG
50 CAPSULE ORAL ONCE
Status: CANCELLED | OUTPATIENT
Start: 2022-05-12

## 2022-05-12 RX ORDER — METHYLPREDNISOLONE SODIUM SUCCINATE 125 MG/2ML
125 INJECTION, POWDER, LYOPHILIZED, FOR SOLUTION INTRAMUSCULAR; INTRAVENOUS
Status: CANCELLED
Start: 2022-05-19

## 2022-05-12 RX ORDER — HEPARIN SODIUM (PORCINE) LOCK FLUSH IV SOLN 100 UNIT/ML 100 UNIT/ML
5 SOLUTION INTRAVENOUS
Status: CANCELLED | OUTPATIENT
Start: 2022-05-14

## 2022-05-12 RX ORDER — DIPHENHYDRAMINE HCL 25 MG
50 CAPSULE ORAL ONCE
Status: CANCELLED | OUTPATIENT
Start: 2022-05-19

## 2022-05-12 RX ORDER — ALBUTEROL SULFATE 0.83 MG/ML
2.5 SOLUTION RESPIRATORY (INHALATION)
Status: CANCELLED | OUTPATIENT
Start: 2022-05-14

## 2022-05-12 RX ORDER — NALOXONE HYDROCHLORIDE 0.4 MG/ML
0.2 INJECTION, SOLUTION INTRAMUSCULAR; INTRAVENOUS; SUBCUTANEOUS
Status: CANCELLED | OUTPATIENT
Start: 2022-05-19

## 2022-05-12 RX ORDER — HEPARIN SODIUM (PORCINE) LOCK FLUSH IV SOLN 100 UNIT/ML 100 UNIT/ML
5 SOLUTION INTRAVENOUS
Status: CANCELLED | OUTPATIENT
Start: 2022-05-12

## 2022-05-12 RX ORDER — EPINEPHRINE 1 MG/ML
0.3 INJECTION, SOLUTION INTRAMUSCULAR; SUBCUTANEOUS EVERY 5 MIN PRN
Status: CANCELLED | OUTPATIENT
Start: 2022-05-12

## 2022-05-12 RX ORDER — HEPARIN SODIUM,PORCINE 10 UNIT/ML
5 VIAL (ML) INTRAVENOUS
Status: CANCELLED | OUTPATIENT
Start: 2022-05-19

## 2022-05-12 RX ORDER — ALBUTEROL SULFATE 0.83 MG/ML
2.5 SOLUTION RESPIRATORY (INHALATION)
Status: CANCELLED | OUTPATIENT
Start: 2022-05-19

## 2022-05-12 RX ORDER — MEPERIDINE HYDROCHLORIDE 25 MG/ML
25 INJECTION INTRAMUSCULAR; INTRAVENOUS; SUBCUTANEOUS EVERY 30 MIN PRN
Status: CANCELLED | OUTPATIENT
Start: 2022-05-12

## 2022-05-12 RX ORDER — LORAZEPAM 2 MG/ML
0.5 INJECTION INTRAMUSCULAR EVERY 4 HOURS PRN
Status: CANCELLED
Start: 2022-05-12

## 2022-05-12 RX ORDER — MEPERIDINE HYDROCHLORIDE 25 MG/ML
25 INJECTION INTRAMUSCULAR; INTRAVENOUS; SUBCUTANEOUS EVERY 30 MIN PRN
Status: CANCELLED | OUTPATIENT
Start: 2022-05-14

## 2022-05-12 RX ORDER — ACETAMINOPHEN 325 MG/1
650 TABLET ORAL ONCE
Status: CANCELLED | OUTPATIENT
Start: 2022-05-19

## 2022-05-12 RX ORDER — ALBUTEROL SULFATE 0.83 MG/ML
2.5 SOLUTION RESPIRATORY (INHALATION)
Status: CANCELLED | OUTPATIENT
Start: 2022-05-23

## 2022-05-12 RX ORDER — METHYLPREDNISOLONE SODIUM SUCCINATE 125 MG/2ML
125 INJECTION, POWDER, LYOPHILIZED, FOR SOLUTION INTRAMUSCULAR; INTRAVENOUS
Status: CANCELLED | OUTPATIENT
Start: 2022-05-12

## 2022-05-12 RX ORDER — DIPHENHYDRAMINE HCL 25 MG
50 CAPSULE ORAL ONCE
Status: COMPLETED | OUTPATIENT
Start: 2022-05-12 | End: 2022-05-12

## 2022-05-12 RX ORDER — MEPERIDINE HYDROCHLORIDE 25 MG/ML
25 INJECTION INTRAMUSCULAR; INTRAVENOUS; SUBCUTANEOUS
Status: CANCELLED | OUTPATIENT
Start: 2022-05-12

## 2022-05-12 RX ORDER — HEPARIN SODIUM,PORCINE 10 UNIT/ML
5 VIAL (ML) INTRAVENOUS
Status: CANCELLED | OUTPATIENT
Start: 2022-05-23

## 2022-05-12 RX ORDER — DIPHENHYDRAMINE HYDROCHLORIDE 50 MG/ML
50 INJECTION INTRAMUSCULAR; INTRAVENOUS
Status: CANCELLED
Start: 2022-05-23

## 2022-05-12 RX ORDER — ALBUTEROL SULFATE 0.83 MG/ML
2.5 SOLUTION RESPIRATORY (INHALATION)
Status: CANCELLED | OUTPATIENT
Start: 2022-05-12

## 2022-05-12 RX ADMIN — RITUXIMAB-ABBS 700 MG: 10 INJECTION, SOLUTION INTRAVENOUS at 11:24

## 2022-05-12 RX ADMIN — SODIUM CHLORIDE 250 ML: 9 INJECTION, SOLUTION INTRAVENOUS at 09:38

## 2022-05-12 RX ADMIN — ACETAMINOPHEN 650 MG: 325 TABLET ORAL at 10:35

## 2022-05-12 RX ADMIN — DIPHENHYDRAMINE HYDROCHLORIDE 25 MG: 25 CAPSULE ORAL at 10:34

## 2022-05-12 RX ADMIN — IRON SUCROSE 300 MG: 20 INJECTION, SOLUTION INTRAVENOUS at 09:39

## 2022-05-12 NOTE — PROGRESS NOTES
Infusion Nursing Note:  Tricia Sosa presents today for C2D15 Rituxan and Venofer #2 of 3.    Patient seen by provider today: No   present during visit today: Not Applicable.    Note: Standing labs drawn for upcoming appointment with MD on 5/19, ok per Dr. Boyd.      Intravenous Access:  Labs drawn without difficulty.  Peripheral IV placed.    Treatment Conditions:  Lab Results   Component Value Date    HGB 8.8 (L) 05/12/2022    WBC 3.3 (L) 05/12/2022    ANEU 6.8 07/06/2021    ANEUTAUTO 1.5 (L) 05/12/2022     (L) 05/12/2022      Lab Results   Component Value Date     05/12/2022    POTASSIUM 3.8 05/12/2022    MAG 2.00 06/08/2000    CR 0.55 05/12/2022    CULLEN 8.1 (L) 05/12/2022    BILITOTAL 0.8 05/12/2022    ALBUMIN 3.6 05/12/2022    ALT 18 05/12/2022    AST 26 05/12/2022     Results reviewed, labs MET treatment parameters, ok to proceed with treatment.      Post Infusion Assessment:  Patient tolerated infusion without incident.  Blood return noted pre and post infusion.  Site patent and intact, free from redness, edema or discomfort.  No evidence of extravasations.  Access discontinued per protocol.       Discharge Plan:   Discharge instructions reviewed with: Patient.  Patient and/or family verbalized understanding of discharge instructions and all questions answered.  AVS to patient via [employer].  Patient will return 5/19 for next appointment.   Patient discharged in stable condition accompanied by: self.  Departure Mode: Ambulatory with a cane.      Hayes Hernandez RN

## 2022-05-13 LAB
HAPTOGLOB SERPL-MCNC: <3 MG/DL (ref 32–197)
IGA SERPL-MCNC: <2 MG/DL (ref 84–499)
IGG SERPL-MCNC: 781 MG/DL (ref 610–1616)
IGM SERPL-MCNC: 11 MG/DL (ref 35–242)

## 2022-05-19 ENCOUNTER — LAB (OUTPATIENT)
Dept: INFUSION THERAPY | Facility: CLINIC | Age: 82
End: 2022-05-19
Attending: INTERNAL MEDICINE
Payer: MEDICARE

## 2022-05-19 ENCOUNTER — INFUSION THERAPY VISIT (OUTPATIENT)
Dept: INFUSION THERAPY | Facility: CLINIC | Age: 82
End: 2022-05-19
Attending: PHYSICIAN ASSISTANT
Payer: MEDICARE

## 2022-05-19 ENCOUNTER — ONCOLOGY VISIT (OUTPATIENT)
Dept: ONCOLOGY | Facility: CLINIC | Age: 82
End: 2022-05-19
Attending: INTERNAL MEDICINE
Payer: MEDICARE

## 2022-05-19 VITALS
RESPIRATION RATE: 16 BRPM | WEIGHT: 160.4 LBS | OXYGEN SATURATION: 97 % | BODY MASS INDEX: 21.75 KG/M2 | SYSTOLIC BLOOD PRESSURE: 148 MMHG | DIASTOLIC BLOOD PRESSURE: 65 MMHG | TEMPERATURE: 98.4 F | HEART RATE: 64 BPM

## 2022-05-19 DIAGNOSIS — D59.19 OTHER AUTOIMMUNE HEMOLYTIC ANEMIA (H): ICD-10-CM

## 2022-05-19 DIAGNOSIS — D83.9 CVID (COMMON VARIABLE IMMUNODEFICIENCY) (H): ICD-10-CM

## 2022-05-19 DIAGNOSIS — D59.19 OTHER AUTOIMMUNE HEMOLYTIC ANEMIA (H): Primary | ICD-10-CM

## 2022-05-19 DIAGNOSIS — D50.9 IRON DEFICIENCY ANEMIA, UNSPECIFIED IRON DEFICIENCY ANEMIA TYPE: Primary | ICD-10-CM

## 2022-05-19 LAB
BASOPHILS # BLD AUTO: 0 10E3/UL (ref 0–0.2)
BASOPHILS NFR BLD AUTO: 0 %
EOSINOPHIL # BLD AUTO: 0 10E3/UL (ref 0–0.7)
EOSINOPHIL NFR BLD AUTO: 1 %
ERYTHROCYTE [DISTWIDTH] IN BLOOD BY AUTOMATED COUNT: 20.9 % (ref 10–15)
HCT VFR BLD AUTO: 31.5 % (ref 35–47)
HGB BLD-MCNC: 9.7 G/DL (ref 11.7–15.7)
IMM GRANULOCYTES # BLD: 0 10E3/UL
IMM GRANULOCYTES NFR BLD: 0 %
LYMPHOCYTES # BLD AUTO: 1.3 10E3/UL (ref 0.8–5.3)
LYMPHOCYTES NFR BLD AUTO: 40 %
MCH RBC QN AUTO: 28.5 PG (ref 26.5–33)
MCHC RBC AUTO-ENTMCNC: 30.8 G/DL (ref 31.5–36.5)
MCV RBC AUTO: 93 FL (ref 78–100)
MONOCYTES # BLD AUTO: 0.4 10E3/UL (ref 0–1.3)
MONOCYTES NFR BLD AUTO: 13 %
NEUTROPHILS # BLD AUTO: 1.5 10E3/UL (ref 1.6–8.3)
NEUTROPHILS NFR BLD AUTO: 46 %
NRBC # BLD AUTO: 0 10E3/UL
NRBC BLD AUTO-RTO: 0 /100
PLATELET # BLD AUTO: 128 10E3/UL (ref 150–450)
RBC # BLD AUTO: 3.4 10E6/UL (ref 3.8–5.2)
WBC # BLD AUTO: 3.3 10E3/UL (ref 4–11)

## 2022-05-19 PROCEDURE — 99214 OFFICE O/P EST MOD 30 MIN: CPT | Performed by: INTERNAL MEDICINE

## 2022-05-19 PROCEDURE — 258N000003 HC RX IP 258 OP 636: Performed by: INTERNAL MEDICINE

## 2022-05-19 PROCEDURE — 250N000011 HC RX IP 250 OP 636: Performed by: INTERNAL MEDICINE

## 2022-05-19 PROCEDURE — 96415 CHEMO IV INFUSION ADDL HR: CPT

## 2022-05-19 PROCEDURE — 96367 TX/PROPH/DG ADDL SEQ IV INF: CPT

## 2022-05-19 PROCEDURE — 36415 COLL VENOUS BLD VENIPUNCTURE: CPT | Performed by: INTERNAL MEDICINE

## 2022-05-19 PROCEDURE — 258N000003 HC RX IP 258 OP 636: Performed by: PHYSICIAN ASSISTANT

## 2022-05-19 PROCEDURE — G0463 HOSPITAL OUTPT CLINIC VISIT: HCPCS | Mod: 25

## 2022-05-19 PROCEDURE — 96413 CHEMO IV INFUSION 1 HR: CPT

## 2022-05-19 PROCEDURE — 85025 COMPLETE CBC W/AUTO DIFF WBC: CPT | Performed by: INTERNAL MEDICINE

## 2022-05-19 PROCEDURE — 250N000013 HC RX MED GY IP 250 OP 250 PS 637: Performed by: INTERNAL MEDICINE

## 2022-05-19 PROCEDURE — 250N000011 HC RX IP 250 OP 636: Performed by: PHYSICIAN ASSISTANT

## 2022-05-19 RX ORDER — HEPARIN SODIUM,PORCINE 10 UNIT/ML
5 VIAL (ML) INTRAVENOUS
Status: CANCELLED | OUTPATIENT
Start: 2022-05-19

## 2022-05-19 RX ORDER — EPINEPHRINE 1 MG/ML
0.3 INJECTION, SOLUTION INTRAMUSCULAR; SUBCUTANEOUS EVERY 5 MIN PRN
Status: CANCELLED | OUTPATIENT
Start: 2022-05-19

## 2022-05-19 RX ORDER — MEPERIDINE HYDROCHLORIDE 25 MG/ML
25 INJECTION INTRAMUSCULAR; INTRAVENOUS; SUBCUTANEOUS EVERY 30 MIN PRN
Status: CANCELLED | OUTPATIENT
Start: 2022-05-19

## 2022-05-19 RX ORDER — DIPHENHYDRAMINE HCL 25 MG
50 CAPSULE ORAL ONCE
Status: COMPLETED | OUTPATIENT
Start: 2022-05-19 | End: 2022-05-19

## 2022-05-19 RX ORDER — HEPARIN SODIUM (PORCINE) LOCK FLUSH IV SOLN 100 UNIT/ML 100 UNIT/ML
5 SOLUTION INTRAVENOUS
Status: CANCELLED | OUTPATIENT
Start: 2022-05-19

## 2022-05-19 RX ORDER — ALBUTEROL SULFATE 0.83 MG/ML
2.5 SOLUTION RESPIRATORY (INHALATION)
Status: CANCELLED | OUTPATIENT
Start: 2022-05-19

## 2022-05-19 RX ORDER — METHYLPREDNISOLONE SODIUM SUCCINATE 125 MG/2ML
125 INJECTION, POWDER, LYOPHILIZED, FOR SOLUTION INTRAMUSCULAR; INTRAVENOUS
Status: CANCELLED
Start: 2022-05-19

## 2022-05-19 RX ORDER — ALBUTEROL SULFATE 90 UG/1
1-2 AEROSOL, METERED RESPIRATORY (INHALATION)
Status: CANCELLED
Start: 2022-05-19

## 2022-05-19 RX ORDER — NALOXONE HYDROCHLORIDE 0.4 MG/ML
0.2 INJECTION, SOLUTION INTRAMUSCULAR; INTRAVENOUS; SUBCUTANEOUS
Status: CANCELLED | OUTPATIENT
Start: 2022-05-19

## 2022-05-19 RX ORDER — DIPHENHYDRAMINE HYDROCHLORIDE 50 MG/ML
50 INJECTION INTRAMUSCULAR; INTRAVENOUS
Status: CANCELLED
Start: 2022-05-19

## 2022-05-19 RX ORDER — ACETAMINOPHEN 325 MG/1
650 TABLET ORAL ONCE
Status: COMPLETED | OUTPATIENT
Start: 2022-05-19 | End: 2022-05-19

## 2022-05-19 RX ADMIN — RITUXIMAB-ABBS 700 MG: 10 INJECTION, SOLUTION INTRAVENOUS at 12:30

## 2022-05-19 RX ADMIN — SODIUM CHLORIDE 250 ML: 9 INJECTION, SOLUTION INTRAVENOUS at 10:38

## 2022-05-19 RX ADMIN — ACETAMINOPHEN 650 MG: 325 TABLET ORAL at 12:09

## 2022-05-19 RX ADMIN — IRON SUCROSE 300 MG: 20 INJECTION, SOLUTION INTRAVENOUS at 10:40

## 2022-05-19 RX ADMIN — DIPHENHYDRAMINE HYDROCHLORIDE 25 MG: 25 CAPSULE ORAL at 12:09

## 2022-05-19 ASSESSMENT — PAIN SCALES - GENERAL: PAINLEVEL: NO PAIN (0)

## 2022-05-19 NOTE — NURSING NOTE
Oncology Rooming Note    May 19, 2022 9:42 AM   Tricia Sosa is a 81 year old female who presents for:    Chief Complaint   Patient presents with     Oncology Clinic Visit       Autoimmune hemolytic anemia      Initial Vitals: BP (!) 148/65   Pulse 64   Temp 98.4  F (36.9  C) (Oral)   Resp 16   Wt 72.8 kg (160 lb 6.4 oz)   LMP  (LMP Unknown)   SpO2 97%   BMI 21.75 kg/m   Estimated body mass index is 21.75 kg/m  as calculated from the following:    Height as of 4/12/22: 1.829 m (6').    Weight as of this encounter: 72.8 kg (160 lb 6.4 oz). Body surface area is 1.92 meters squared.  No Pain (0) Comment: Data Unavailable   No LMP recorded (lmp unknown). Patient is postmenopausal.  Allergies reviewed: Yes  Medications reviewed: Yes    Medications: Medication refills not needed today.  Pharmacy name entered into Note:    [place], [place] - [address] [id number] [place], [place] - [address] [facility]    Clinical concerns: f/u       Barbara Corrigan CMA

## 2022-05-19 NOTE — PROGRESS NOTES
Nursing Note:  Tricia Sosa presents today for cbc.    Patient seen by provider today: Yes: to see DR Boyd   present during visit today: Not Applicable.    Note: N/A.    Intravenous Access:  Labs drawn without difficulty.  Peripheral IV placed.    Discharge Plan:   Patient was sent to [facility]  for MD appointment.    Serina Otto RN

## 2022-05-19 NOTE — PROGRESS NOTES
Infusion Nursing Note:  Tricia Sosa presents today for Venofer #3 and C2D22 Rapid Infusion Rituxan.    Patient seen by provider today: Yes: Dr. Boyd   present during visit today: Not Applicable.    Note: Patient requested 25mg oral Benadryl as she has used in the past.       Intravenous Access:  Peripheral IV placed in fast track    Treatment Conditions:  Lab Results   Component Value Date    HGB 9.7 (L) 05/19/2022    WBC 3.3 (L) 05/19/2022    ANEU 6.8 07/06/2021    ANEUTAUTO 1.5 (L) 05/19/2022     (L) 05/19/2022      Lab Results   Component Value Date     05/12/2022    POTASSIUM 3.8 05/12/2022    MAG 2.00 06/08/2000    CR 0.55 05/12/2022    CULLEN 8.1 (L) 05/12/2022    BILITOTAL 0.8 05/12/2022    ALBUMIN 3.6 05/12/2022    ALT 18 05/12/2022    AST 26 05/12/2022     Results reviewed, labs MET treatment parameters, ok to proceed with treatment.      Post Infusion Assessment:  Patient tolerated infusion without incident.  Blood return noted pre and post infusion.  Site patent and intact, free from redness, edema or discomfort.  No evidence of extravasations.  Access discontinued per protocol.       Discharge Plan:   Copy of AVS reviewed with patient and/or family.    Patient's follow up appointments need to schedule. Patient verbalized understanding to check mychart for future appointments.    Patient discharged in stable condition accompanied by: self.  Departure Mode: Ambulatory with eloisa Valentin RN

## 2022-05-19 NOTE — LETTER
5/19/2022         RE: Tricia Sosa  [id number]  [facility] [id number]        Dear Colleague,    Thank you for referring your patient, Tricia Sosa, to the [facility]. Please see a copy of my visit note below.    [facility]    Hematology/Oncology Established Patient Follow-up Note      Today's Date: 5/19/22    Reason for Follow-up: Hemolytic anemia-autoimmune; IgA deficiency    HISTORY OF PRESENT ILLNESS: Tricia Sosa is a 81 year old female who presents with autoimmune hemolytic anemia and IgA deficiency.  She previously saw Dr. Matos and then Dr. Summers.  She was previously seen at [facility].    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  She was very sick at the time and had severe diarrhea.  She lost about 40 pounds in weight.  The workup revealed that she had markedly diminished CD4 counts of 48 and was diagnosed with CMV colitis.  Since then she has been followed in hematology clinic at [place] until recently.  She was noted to have anemia which was fairly long-standing.  She was initially referred for anemia in 2004 and also had a bone marrow biopsy done at that time which revealed hypercellular bone marrow with 70-80% cellularity and normal trilineage hematopoiesis and no morphologic features of MDS or lymphoproliferation.  Her anemia was attributed to her chronic underlying disease.  At the next evaluation in 2002 on 4 aggressive anemia there was no evidence of hemolysis, iron deficiency, B12 or folate deficiency.  Bone marrow biopsy was not pursued.  In summer of 2015 she had progressive fatigue, dyspnea on exertion and worsening anemia with a hemoglobin now of 9.  Workup at this time did suggest a hemolytic anemia with elevated LDH at 709, undetectable haptoglobin and elevated unconjugated bilirubin at 3.3.  Monospecific MARIKA was positive for both IgG and complement.  Cold agglutinin screen  was positive with very low titer 1:64.  She was started on prednisone 60 mg daily with supplementation of iron folate and B12 starting 7/31/15.  Her prednisone has been slowly tapered and was discontinued off in January 2016.  She was last followed at [facility] on March 10, 2016 when she had stable hemoglobin.    She was followed by Dr. Matos and Dr. Summers.  Due to relapse of hemolytic anemia, she underwent another course of prednisone that has since been tapered off and rituximab x 4 weekly doses completed in 9/19/19.    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  She started weekly Rituxan on 7/31/20 x 4 doses, completed on 8/21/20.  She tapered off of prednisone in October 2020.    Due to relapse of her AIHA, she started prednisone again on 6/8/21 at 60 mg daily with slow taper and finished taper in August 2021.    Due to relapse of AIHA again, she started prednisone again on 11/2/21 at 70 mg daily with slow taper.  She also completed weekly Rituxan again x 4 doses 12/6/21-12/29/21.      INTERIM HISTORY: Tricia feels okay.  She is getting her 4th dose of weekly rituximab again today.      REVIEW OF SYSTEMS:   14 point ROS was reviewed and is negative other than as noted above in HPI.       HOME MEDICATIONS:  Current Outpatient Medications   Medication Sig Dispense Refill     cyanocobalamin (VITAMIN  B-12) 1000 MCG tablet   3     Flaxseed, Linseed, (FLAX SEEDS PO) Take by mouth daily       folic acid (FOLVITE) 1 MG tablet   3     hydrochlorothiazide (HYDRODIURIL) 12.5 MG tablet Take 1 tablet (12.5 mg) by mouth daily as needed (edema) 30 tablet 5     hydrocortisone butyrate (LOCOID) 0.1 % SOLN solution Apply to scalp bid prn 60 mL 11     ketoconazole (NIZORAL) 2 % external shampoo Use every other day to scalp as needed 120 mL 11     levothyroxine (SYNTHROID/LEVOTHROID) 150 MCG tablet Take 1 tablet (150 mcg) by mouth daily 90 tablet 3      sulfamethoxazole-trimethoprim (BACTRIM DS) 800-160 MG tablet Take 1 pill orally on Mon, Wed and Friday 45 tablet 3     triamcinolone (KENALOG) 0.1 % external cream        UNABLE TO FIND privigen inj every two months           ALLERGIES:  Allergies   Allergen Reactions     Doxycycline          PAST MEDICAL HISTORY:  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, and posterior vitreous detachment     Urinary tract infection, site not specified     Recurrent UTI's         PAST SURGICAL HISTORY:  Past Surgical History:   Procedure Laterality Date     COLONOSCOPY N/A 4/26/2016    Procedure: COLONOSCOPY;  Surgeon: Dontae Del Rio MD;  Location:  GI     ENT SURGERY  1985    Thyroid lobectomy     EYE SURGERY Bilateral 04/20/2021    Cataract Surgery      CYSTOSCOPY,INSERT URETERAL STENT      Ureteral stent insertion     HC FLEX SIGMOIDOSCOPY W BIOPSY  5/30/00      LAPAROSCOPY, SURGICAL; CHOLECYSTECTOMY  1992      THYROIDECTOMY       LIGATION OF HEMORRHOID(S)      Hemorrhoidectomy     UPPER GI ENDOSCOPY,BIOPSY  10/01/01     [facility] LIGATE FALLOPIAN TUBE       [facility] COLONOSCOPY W BIOPSY  4/26/00     ZZ COLONOSCOPY W BIOPSY  10/8/03     ZZ COLONOSCOPY W BIOPSY  6/27/05    REPEAT IN 5 YEARS         SOCIAL HISTORY:  Social History     Socioeconomic History     Marital status:      Spouse name: Not on file     Number of children: Not on file     Years of  education: Not on file     Highest education level: Not on file   Occupational History     Not on file   Tobacco Use     Smoking status: Never Smoker     Smokeless tobacco: Never Used   Substance and Sexual Activity     Alcohol use: Yes     Alcohol/week: 0.0 standard drinks     Comment: 1 a day at most     Drug use: No     Sexual activity: Not Currently     Partners: Male   Other Topics Concern     Parent/sibling w/ CABG, MI or angioplasty before 65F 55M? No   Social History Narrative     Not on file     Social Determinants of Health     Financial Resource Strain: Not on file   Food Insecurity: Not on file   Transportation Needs: Not on file   Physical Activity: Not on file   Stress: Not on file   Social Connections: Not on file   Intimate Partner Violence: Not At Risk     Fear of Current or Ex-Partner: No     Emotionally Abused: No     Physically Abused: No     Sexually Abused: No   Housing Stability: Not on file         FAMILY HISTORY:  Family History   Problem Relation Age of Onset     Colon Cancer Mother 90     Cerebrovascular Disease Father          at age 85     Dementia Brother      Prostate Cancer Brother      Thyroid Disease Brother      Dementia Brother      Thyroid Disease Brother      Pancreatic Cancer Brother      Breast Cancer Sister      Hyperlipidemia Sister      Depression Sister      Anxiety Disorder Sister      Thyroid Disease Sister      Breast Cancer Sister      Colon Cancer Niece      Other Cancer Niece         leukemia         PHYSICAL EXAM:  BP (!) 148/65   Pulse 64   Temp 98.4  F (36.9  C) (Oral)   Resp 16   Wt 72.8 kg (160 lb 6.4 oz)   LMP  (LMP Unknown)   SpO2 97%   BMI 21.75 kg/m    ECO  GENERAL/CONSTITUTIONAL: No acute distress.  EYES: No scleral icterus.  NEUROLOGIC: Alert, oriented, answers questions appropriately.  INTEGUMENTARY: No jaundice.        LABS:  CBC RESULTS: Recent Labs   Lab Test 22  0921   WBC 3.3*   RBC 3.40*   HGB 9.7*   HCT 31.5*   MCV 93   MCH  28.5   MCHC 30.8*   RDW 20.9*   *       Recent Labs   Lab Test 05/12/22  0925 03/15/22  0922    139   POTASSIUM 3.8 4.0   CHLORIDE 109 108   CO2 25 26   ANIONGAP 4 5   * 119*   BUN 16 13   CR 0.55 0.80   CULLEN 8.1* 8.6     Lab Results   Component Value Date    AST 26 05/12/2022    AST 30 06/29/2021     Lab Results   Component Value Date    ALT 18 05/12/2022    ALT 36 06/29/2021     No results found for: BILICONJ   Lab Results   Component Value Date    BILITOTAL 0.8 05/12/2022    BILITOTAL 0.8 06/29/2021     Lab Results   Component Value Date    ALBUMIN 3.6 05/12/2022    ALBUMIN 3.9 06/29/2021     Lab Results   Component Value Date    PROTTOTAL 6.1 05/12/2022    PROTTOTAL 6.4 06/29/2021      Lab Results   Component Value Date    ALKPHOS 102 05/12/2022    ALKPHOS 97 06/29/2021       Component      Latest Ref Rng & Units 5/12/2022   IGG      610 - 1,616 mg/dL 781   IGA      84 - 499 mg/dL <2 (L)   IGM      35 - 242 mg/dL 11 (L)   % Retic      0.5 - 2.0 % 4.4 (H)   Absolute Retic      0.025 - 0.095 10e6/uL 0.144 (H)   Haptoglobin      32 - 197 mg/dL <3 (L)   Lactate Dehydrogenase      81 - 234 U/L 197         ASSESSMENT/PLAN:  Tricia Sosa is a 81 year old female with:      1. 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 Common variable immunodeficiency disorder  4. Splenomegaly        1) Autoimmune hemolytic anemia: She had relapse in July 2020, and started on prednisone, as well as weekly rituximab infusions x 4, completed on 8/21/20.  Since then, she has been off and on prednisone and rituximab, getting response each time, but relapses are getting more frequent.      She does not want to consider splenectomy yet.     She is completing another 4 doses of weekly rituxumab in May 2022, and then will go on maintenance rituximab to try to maintain her hemoglobin longer.  She wants to try to avoid having to  go back on steroids if possible.    -she has tapered off of prednisone again as of early March 2022.    -hemoglobin improved to 9.7 today  -monitor CBC monthly  -she takes Bactrim for prophylaxis when she is on prednisone - currently off  -will go on maintenance rituximab after today - next one in 2 months  -RTC with hematologist in 2 months with rituximab.  I informed patient that I am leaving [facility] soon.  She would like to transfer cares to Dr. Ryder next.      2) CVID:   -continue IVIG.  She gets it, if IgG is <600.  It's been about every other month.    -IgG check and IVIG every 4 weeks if meets parameters  -she saw immunology in [place] who recommended IVIG monthly, but our financial team continues to check and says that it is only covered with IgG is >600.  We will check if it is covered with the IgG subclasses are low.    3) Iron-deficiency anemia: receiving Venofer 300 mg 3rd of 3 planned doses today.  -repeat iron and ferritin labs with the visit in 2 months      Sabina Boyd MD  Hematology/Oncology  [facility]        Total time spent on day of visit, including review of tests, obtaining/reviewing separately obtained history, ordering medications/tests/procedures, communicating with PCP/consultants, and documenting in electronic medical record: 30 minutes        Again, thank you for allowing me to participate in the care of your patient.        Sincerely,        Sabina Boyd MD

## 2022-05-19 NOTE — PROGRESS NOTES
[facility]    Hematology/Oncology Established Patient Follow-up Note      Today's Date: 5/19/22    Reason for Follow-up: Hemolytic anemia-autoimmune; IgA deficiency    HISTORY OF PRESENT ILLNESS: Tricia Sosa is a 81 year old female who presents with autoimmune hemolytic anemia and IgA deficiency.  She previously saw Dr. Matos and then Dr. Summers.  She was previously seen at [facility].    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  She was very sick at the time and had severe diarrhea.  She lost about 40 pounds in weight.  The workup revealed that she had markedly diminished CD4 counts of 48 and was diagnosed with CMV colitis.  Since then she has been followed in hematology clinic at [place] until recently.  She was noted to have anemia which was fairly long-standing.  She was initially referred for anemia in 2004 and also had a bone marrow biopsy done at that time which revealed hypercellular bone marrow with 70-80% cellularity and normal trilineage hematopoiesis and no morphologic features of MDS or lymphoproliferation.  Her anemia was attributed to her chronic underlying disease.  At the next evaluation in 2002 on 4 aggressive anemia there was no evidence of hemolysis, iron deficiency, B12 or folate deficiency.  Bone marrow biopsy was not pursued.  In summer of 2015 she had progressive fatigue, dyspnea on exertion and worsening anemia with a hemoglobin now of 9.  Workup at this time did suggest a hemolytic anemia with elevated LDH at 709, undetectable haptoglobin and elevated unconjugated bilirubin at 3.3.  Monospecific MARIKA was positive for both IgG and complement.  Cold agglutinin screen was positive with very low titer 1:64.  She was started on prednisone 60 mg daily with supplementation of iron folate and B12 starting 7/31/15.  Her prednisone has been slowly tapered and was discontinued off in January 2016.  She was last followed at [facility]  on March 10, 2016 when she had stable hemoglobin.    She was followed by Dr. Matos and Dr. Summers.  Due to relapse of hemolytic anemia, she underwent another course of prednisone that has since been tapered off and rituximab x 4 weekly doses completed in 9/19/19.    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  She started weekly Rituxan on 7/31/20 x 4 doses, completed on 8/21/20.  She tapered off of prednisone in October 2020.    Due to relapse of her AIHA, she started prednisone again on 6/8/21 at 60 mg daily with slow taper and finished taper in August 2021.    Due to relapse of AIHA again, she started prednisone again on 11/2/21 at 70 mg daily with slow taper.  She also completed weekly Rituxan again x 4 doses 12/6/21-12/29/21.      INTERIM HISTORY: Tricia feels okay.  She is getting her 4th dose of weekly rituximab again today.      REVIEW OF SYSTEMS:   14 point ROS was reviewed and is negative other than as noted above in HPI.       HOME MEDICATIONS:  Current Outpatient Medications   Medication Sig Dispense Refill     cyanocobalamin (VITAMIN  B-12) 1000 MCG tablet   3     Flaxseed, Linseed, (FLAX SEEDS PO) Take by mouth daily       folic acid (FOLVITE) 1 MG tablet   3     hydrochlorothiazide (HYDRODIURIL) 12.5 MG tablet Take 1 tablet (12.5 mg) by mouth daily as needed (edema) 30 tablet 5     hydrocortisone butyrate (LOCOID) 0.1 % SOLN solution Apply to scalp bid prn 60 mL 11     ketoconazole (NIZORAL) 2 % external shampoo Use every other day to scalp as needed 120 mL 11     levothyroxine (SYNTHROID/LEVOTHROID) 150 MCG tablet Take 1 tablet (150 mcg) by mouth daily 90 tablet 3     sulfamethoxazole-trimethoprim (BACTRIM DS) 800-160 MG tablet Take 1 pill orally on Mon, Wed and Friday 45 tablet 3     triamcinolone (KENALOG) 0.1 % external cream        UNABLE TO FIND privigen inj every two months           ALLERGIES:  Allergies   Allergen Reactions      Doxycycline          PAST MEDICAL HISTORY:  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, and posterior vitreous detachment     Urinary tract infection, site not specified     Recurrent UTI's         PAST SURGICAL HISTORY:  Past Surgical History:   Procedure Laterality Date     COLONOSCOPY N/A 4/26/2016    Procedure: COLONOSCOPY;  Surgeon: Dontae Del Rio MD;  Location:  GI     ENT SURGERY  1985    Thyroid lobectomy     EYE SURGERY Bilateral 04/20/2021    Cataract Surgery     HC CYSTOSCOPY,INSERT URETERAL STENT      Ureteral stent insertion     HC FLEX SIGMOIDOSCOPY W BIOPSY  5/30/00     HC LAPAROSCOPY, SURGICAL; CHOLECYSTECTOMY  1992      THYROIDECTOMY       LIGATION OF HEMORRHOID(S)      Hemorrhoidectomy     UPPER GI ENDOSCOPY,BIOPSY  10/01/01     ZZC LIGATE FALLOPIAN TUBE       ZZ[facility] COLONOSCOPY W BIOPSY  4/26/00     ZZHC COLONOSCOPY W BIOPSY  10/8/03     ZZHC COLONOSCOPY W BIOPSY  6/27/05    REPEAT IN 5 YEARS         SOCIAL HISTORY:  Social History     Socioeconomic History     Marital status:      Spouse name: Not on file     Number of children: Not on file     Years of education: Not on file     Highest education level: Not on file   Occupational History     Not on file   Tobacco Use     Smoking status: Never Smoker     Smokeless tobacco: Never Used   Substance and Sexual Activity     Alcohol use: Yes     Alcohol/week: 0.0 standard drinks      Comment: 1 a day at most     Drug use: No     Sexual activity: Not Currently     Partners: Male   Other Topics Concern     Parent/sibling w/ CABG, MI or angioplasty before 65F 55M? No   Social History Narrative     Not on file     Social Determinants of Health     Financial Resource Strain: Not on file   Food Insecurity: Not on file   Transportation Needs: Not on file   Physical Activity: Not on file   Stress: Not on file   Social Connections: Not on file   Intimate Partner Violence: Not At Risk     Fear of Current or Ex-Partner: No     Emotionally Abused: No     Physically Abused: No     Sexually Abused: No   Housing Stability: Not on file         FAMILY HISTORY:  Family History   Problem Relation Age of Onset     Colon Cancer Mother 90     Cerebrovascular Disease Father          at age 85     Dementia Brother      Prostate Cancer Brother      Thyroid Disease Brother      Dementia Brother      Thyroid Disease Brother      Pancreatic Cancer Brother      Breast Cancer Sister      Hyperlipidemia Sister      Depression Sister      Anxiety Disorder Sister      Thyroid Disease Sister      Breast Cancer Sister      Colon Cancer Niece      Other Cancer Niece         leukemia         PHYSICAL EXAM:  BP (!) 148/65   Pulse 64   Temp 98.4  F (36.9  C) (Oral)   Resp 16   Wt 72.8 kg (160 lb 6.4 oz)   LMP  (LMP Unknown)   SpO2 97%   BMI 21.75 kg/m    ECO  GENERAL/CONSTITUTIONAL: No acute distress.  EYES: No scleral icterus.  NEUROLOGIC: Alert, oriented, answers questions appropriately.  INTEGUMENTARY: No jaundice.        LABS:  CBC RESULTS: Recent Labs   Lab Test 22  0921   WBC 3.3*   RBC 3.40*   HGB 9.7*   HCT 31.5*   MCV 93   MCH 28.5   MCHC 30.8*   RDW 20.9*   *       Recent Labs   Lab Test 22  0925 03/15/22  0922    139   POTASSIUM 3.8 4.0   CHLORIDE 109 108   CO2 25 26   ANIONGAP 4 5   * 119*   BUN 16 13   CR 0.55 0.80   CULLEN 8.1* 8.6     Lab Results   Component Value Date    AST  26 05/12/2022    AST 30 06/29/2021     Lab Results   Component Value Date    ALT 18 05/12/2022    ALT 36 06/29/2021     No results found for: BILICONJ   Lab Results   Component Value Date    BILITOTAL 0.8 05/12/2022    BILITOTAL 0.8 06/29/2021     Lab Results   Component Value Date    ALBUMIN 3.6 05/12/2022    ALBUMIN 3.9 06/29/2021     Lab Results   Component Value Date    PROTTOTAL 6.1 05/12/2022    PROTTOTAL 6.4 06/29/2021      Lab Results   Component Value Date    ALKPHOS 102 05/12/2022    ALKPHOS 97 06/29/2021       Component      Latest Ref Rng & Units 5/12/2022   IGG      610 - 1,616 mg/dL 781   IGA      84 - 499 mg/dL <2 (L)   IGM      35 - 242 mg/dL 11 (L)   % Retic      0.5 - 2.0 % 4.4 (H)   Absolute Retic      0.025 - 0.095 10e6/uL 0.144 (H)   Haptoglobin      32 - 197 mg/dL <3 (L)   Lactate Dehydrogenase      81 - 234 U/L 197         ASSESSMENT/PLAN:  Tricia Sosa is a 81 year old female with:      1. 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 Common variable immunodeficiency disorder  4. Splenomegaly        1) Autoimmune hemolytic anemia: She had relapse in July 2020, and started on prednisone, as well as weekly rituximab infusions x 4, completed on 8/21/20.  Since then, she has been off and on prednisone and rituximab, getting response each time, but relapses are getting more frequent.      She does not want to consider splenectomy yet.     She is completing another 4 doses of weekly rituxumab in May 2022, and then will go on maintenance rituximab to try to maintain her hemoglobin longer.  She wants to try to avoid having to go back on steroids if possible.    -she has tapered off of prednisone again as of early March 2022.    -hemoglobin improved to 9.7 today  -monitor CBC monthly  -she takes Bactrim for prophylaxis when she is on prednisone - currently off  -will go on maintenance rituximab after  today - next one in 2 months  -RTC with hematologist in 2 months with rituximab.  I informed patient that I am leaving [facility] soon.  She would like to transfer cares to Dr. Aleksey sam.      2) CVID:   -continue IVIG.  She gets it, if IgG is <600.  It's been about every other month.    -IgG check and IVIG every 4 weeks if meets parameters  -she saw immunology in [place] who recommended IVIG monthly, but our financial team continues to check and says that it is only covered with IgG is >600.  We will check if it is covered with the IgG subclasses are low.    3) Iron-deficiency anemia: receiving Venofer 300 mg 3rd of 3 planned doses today.  -repeat iron and ferritin labs with the visit in 2 months      Sabina Boyd MD  Hematology/Oncology  [facility]        Total time spent on day of visit, including review of tests, obtaining/reviewing separately obtained history, ordering medications/tests/procedures, communicating with PCP/consultants, and documenting in electronic medical record: 30 minutes

## 2022-05-23 NOTE — PATIENT INSTRUCTIONS
Per chart review, appt request order has been deferred until 5/27/22 by JU BATES RN, BSN, COLLEEN  RN Care Coordinator  [facility]  [phone]

## 2022-06-08 ENCOUNTER — HOSPITAL ENCOUNTER (OUTPATIENT)
Dept: PHYSICAL THERAPY | Facility: CLINIC | Age: 82
Setting detail: THERAPIES SERIES
Discharge: HOME | End: 2022-06-08
Attending: STUDENT IN AN ORGANIZED HEALTH CARE EDUCATION/TRAINING PROGRAM
Payer: MEDICARE

## 2022-06-08 ENCOUNTER — HOSPITAL ENCOUNTER (OUTPATIENT)
Dept: OCCUPATIONAL THERAPY | Facility: CLINIC | Age: 82
Setting detail: THERAPIES SERIES
Discharge: HOME | End: 2022-06-08
Attending: STUDENT IN AN ORGANIZED HEALTH CARE EDUCATION/TRAINING PROGRAM
Payer: MEDICARE

## 2022-06-08 DIAGNOSIS — M62.81 GENERALIZED MUSCLE WEAKNESS: ICD-10-CM

## 2022-06-08 PROCEDURE — 97162 PT EVAL MOD COMPLEX 30 MIN: CPT | Mod: GP | Performed by: PHYSICAL THERAPIST

## 2022-06-08 PROCEDURE — 97110 THERAPEUTIC EXERCISES: CPT | Mod: GP | Performed by: PHYSICAL THERAPIST

## 2022-06-08 PROCEDURE — 97165 OT EVAL LOW COMPLEX 30 MIN: CPT | Mod: GO

## 2022-06-08 NOTE — PROGRESS NOTES
[facility]          OUTPATIENT OCCUPATIONAL THERAPY  EVALUATION  PLAN OF TREATMENT FOR OUTPATIENT REHABILITATION  (COMPLETE FOR INITIAL CLAIMS ONLY)  Patient's Last Name, First Name, M.I.  YOB: 1940  Tricia Sosa                        Provider's Name  [facility] Medical Record No.  [id number]                               Onset Date:     05/10/22 (date of order used)   Start of Care Date:     06/08/22   Type:     ___PT   _X_OT   ___SLP Medical Diagnosis:     Generalized muscle weakness                          OT Diagnosis:     Decreased safety and IND with ADLs/IADLs Visits from SOC:  1   _________________________________________________________________________________  Plan of Treatment/Functional Goals:  ADL training, IADL training, Community/work reintegration, Coordination training, ROM, Self care/Home management, Strengthening, Stretching, Therapeutic activities, Transfer training, Visual perception                    Goals  Goal Identifier: Hand Strengthening  Goal Description: Pt will demonstrate improved bilateral strength by 3# pinch strength (lateral and palmar) for improved ADL/IADL completion. (e.g. grasping utensils, opening containers, holding/carrying objects).  Target Date: 08/31/22     Goal Identifier: UE Strengthening  Goal Description: Pt will demonstrate independent completion of 3-5 activity HEP for UE strength/endurance to promote independence with ADLs/IADLs (e.g. retrieving object from cabinet or refrigerator, microwaving food, hanging laundry, putting away dishes).  Target Date: 08/31/22     Goal Identifier: Fatigue Management/Sleep Hygiene  Goal Description: Pt will demonstrate 3 energy conservation strategies (e.g. pacing, planning, prioritizing, sleep hygiene, etc.) to facilitate fatigue management with ADL/IADL tasks (e.g. laundry,  meal preparation, walking/exercise).  Target Date: 08/31/22     Goal Identifier: Adaptive Equipment/Strategies  Goal Description: Patient to verbalize understanding of and demonstrate use of 3 new pieces of adaptive equipment and/or adapted techniques to increase independence and safety with ADL/IADLs (dressing, bathing, fall prevention, etc.)  Target Date: 08/31/22                                                          Therapy Frequency: 1x/week     Predicted Duration of Therapy Intervention (days/wks): 12 weeks (starting with 6 weeks scheduled)  EULALIA Antonio          I CERTIFY THE NEED FOR THESE SERVICES FURNISHED UNDER        THIS PLAN OF TREATMENT AND WHILE UNDER MY CARE     (Physician co-signature of this document indicates review and certification of the therapy plan).               ,    Certification date from: 06/08/22, Certification date to: 08/31/22               Referring Physician: Marley Heart MD     Initial Assessment        See Epic Evaluation      Start Of Care Date: 06/08/22

## 2022-06-08 NOTE — PROGRESS NOTES
"   06/08/22 1000   Quick Adds   Quick Adds Certification   Type of Visit Initial Outpatient Occupational Therapy Evaluation   General Information   Start Of Care Date 06/08/22   Referring Physician Marley Heart MD   Orders Evaluate and treat as indicated   Other Orders PT   Orders Date 05/10/22   Medical Diagnosis Generalized muscle weakness   Onset of Illness/Injury or Date of Surgery 05/10/22  (date of order used)   Precautions/Limitations Fall precautions   Surgical/Medical History Reviewed Yes   Additional Occupational Profile Info/Pertinent History of Current Problem Tricia Sosa is a 81 year old, right hand dominant female who presents to outpatient OT for evaluation due to 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 PMH includes autoimmune hemolytic anemia and IgA deficiency, thyroid disease. Reports [id number] is when difficulties started. Per chart review: 'She reports that she has been having Poor balance; she reports that he balance is \"terrible\". She reports also having generalized weakness overall her body. She reports that she Leaning forward to compensate for the impaired balance.  She reports that she uses single point cane. She had been on steroid for her autoimmune hemolytic anemia. She reports that she stopped steroid in 2/2022.  She has has been having falls over the past year. She reports that the last fall was on  1/2022. She denies any falls or near falls since then.  She reports that she is able to catch herself. She reports that she has been using the chair lift to go upstairs. She denies any lower back pain. She reports localized neck pain with no radiation. She reports she has had bilateral feet numbness over the plantar surface. She reports she had EMG done  for the numbness. She reports that she was diagnosed with peripheral neuropathy. Last formal PT was in 5/2021, she is resuming PT services at this clinic. Functionally, she " "modified independent with mobility. She reports that performing activities of daily living has been challenging.\"   Comments/Observations Attends session alone today and ambulates with SEC. Reports had an OT come to the house for her  when he had COPD but otherwise does not know the role of OT.   Role/Living Environment   Current Community Support Family/friend caregiver   Patient role/Employment history Retired  ()   Community/Avocational Activities Reports very little storage in her home so she spends a significant amount of time sorting through items and selling them on [employer] and [employer]. Reports she also does a small amount of gardening.   Current Living Environment House  (2 story with basement)   Number of Stairs to Enter Home 6+   Number of Stairs Within Home 14 to each level  (room is upper level, stays on main level most of the day)   Primary Bathroom Location/Comments Upper Level   Primary Bathroom Set Up/Equipment Shower stall;Shower grab bar;Shower/tub chair  (doesn't use the shower chair yet)   Prior Level - Transfers Independent   Prior Level - Ambulation Independent   Prior Level - ADLS Independent   Prior Responsibilities - IADL Meal Preparation;Housekeeping;Laundry;Shopping;Yardwork;Medication management;Finances;Driving   Prior Level Comments IND with ADLs/IADLs   Current Assistive Devices - Mobility Standard cane  (Hurrycane)   Role/Living Environment Comments Pt bought house with her son after he  passed. Lives with son, DIL, and grandson. Reports it is not the house she would've chosen for herself regarding accessibility but wanted family to be happy with the choice.   Patient/family Goals Statement To address the following: \"weakness in back and legs and arms - can't stand or walk far without support; balance is poor.\"   Pain   Patient currently in pain No   Pain comments Reports she does not typically have pain. Sometimes has discomfort in upper " "arms up to shoulders and neck occasionally that started around Mid-March. Typically feels more at nighttime but feels a little today during the day. Has trouble getting comfortable at night, resulting in poor sleep. Also has CPAP machine.   Fall Risk Screen   Fall screen completed by OT   Have you fallen 2 or more times in the past year? No   Have you fallen and had an injury in the past year? No   Is patient a fall risk? Yes;Department fall risk interventions implemented   Fall screen comments One fall in January due to balance, reports she stumbled and fell forward on arms and knees. Pt starting PT services in this clinic as of today's evaluation (6/8/22, see PT note for details).   Abuse Screen (yes response referral indicated)   Feels Unsafe at Home or Work/School no   Feels Threatened by Someone no   Does Anyone Try to Keep You From Having Contact with Others or Doing Things Outside Your Home? no   Physical Signs of Abuse Present no   Cognitive Status Examination   Orientation Orientation to person, place and time   Level of Consciousness Alert   Follows Commands and Answers Questions 100% of the time;Able to follow single-step instructions   Personal Safety and Judgment Intact   Cognitive Comment Pt feels her cognition is \"fine.\" Reports she had a neuropsychological test in 2016 as her twin brothers were diagnosed with Alzheimer's, but she was found to have \"normal\" cognition. Doesn't feel her thinking has been impacted with all her physical changes. Will monitor and further assess prn.   Visual Perception   Visual Perception Wears glasses  (for peripersonal tasks)   Visual Perception Comments Reports she had cataract surgery and lost her close-up vision. Still has blurriness in R eye as she has additional issue in this eye. Reports she has difficulty reading signs and seeing properly, so she chose to discontinue driving last year.   Sensation   Sensation Comments Reports peripheral neuropathy in bilateral " "feet.   Range of Motion (ROM)   ROM Comments BUEs WFLs per gross ROM screen.   Strength   Strength Comments Reports generalized muscle weakness as well as \"discomfort\" in upper arms and neck. With MMT of shoulder flexion/extension, abduction/adduction, external/internal rotation, and elbow and wrist flexion/extension, pt with 3+/5 strength in shoulder flexion and abduction bilaterally and 4/5 strength adduction in R shoulder. All other areas tested 5/5.   Hand Strength   Hand Dominance Right   Left Hand  (pounds) 35 pounds  (WNLs)   Right Hand  (pounds) 41 pounds  (WNLs)   Left Lateral Pinch (pounds) 6 pounds  (2-3 SD below the mean)   Right Lateral Pinch (pounds) 6 pounds  (2-3 SD below the mean)   Left Three Point Pinch (pounds) 7 pounds  (1-2 SD below the mean)   Right Three Point Pinch (pounds) 9 pounds  (1-2 SD below the mean)   Hand Strength Comments Reports generalized difficulty with opening containers/packages, including jars/lids. Reports she does us a jar opener and will usually use a knife/ to open packages. Overall, thinks she may have arthritis in her hands as completing tasks like  testing can make her hands feel a little \"uncomfortable.\"   Coordination   Coordination Comments Reports very small buttons can be challenging but otherwise feels she is doing well regarding coordination. GMC and FMC skills appear WFLs per observation of performance this date but will plan to further assess prn.   Balance   Balance Comments Reports significant decline in balance that started around 0781-0040. Feels she always needs to be holding onto something.   Functional Mobility   Ambulation Mod I, increased time, hand hold   Functional Mobility Comments Has chair lift between main level and upper level and main level and lower level. Tries not to use all the time but uses if feeling fatigued or if doing laundry.   Transfer Skill   Level of [place]: Transfers other (see comments)  (Mod I, " "increased time, hand hold)   Bathing   Level of [place] - Bathing other (see comments)  (Mod I, increased time, hand hold)   Bathing Comments Reports she has trouble with balance if she is not holding onto anything. Has grab bars in the shower. Has shower chair but hasn't used it yet. Anticipates she will need to soon.   Upper Body Dressing   Level of [place]: Dress Upper Body other (see comments)  (Mod I, increased time)   Upper Body Dressing Comments Some buttons are too small but otherwise able to dress IND.   Lower Body Dressing   Level of [place]: Dress Lower Body other (see comments)  (Mod I, increased time)   Lower Body Dressing Comments Reports socks can be challenging due to having to bend her legs.   Toileting   Level of [place]: Toilet other (see comments)  (Mod I, increased time)   Toileting Comments Reports she has raised toilets but requires hand hold for getting off toilet. Reports in one bathroom, she pushes off from the countertop otherwise she pushes up from the toilet seat to get up. Overall, feels this can be challenging.   Grooming   Level of [place]: Grooming other (see comments)  (Mod I, increased time)   Grooming Comments Reports clipping her toenails is challenging because she cannot reach very well.   Eating/Self-Feeding   Level of [place]: Eating independent   Activity Tolerance   Activity Tolerance Pt reports she has a very low energy level likely impacted by hemolytic anemia, iron deficiency anemia, and prednisone. Occasionally does elliptical but only for about 1-2 minutes at a time and completes at a slower pace these days than she used to. Also reports difficulty with sleeping.   Instrumental Activities of Daily Living Assessment   IADL Assessment/Observations Medication management: manages IND using pillboxes. Frequently forgets to take medications. \"I am a lousy pill-taker.\" Typically remembers thyroid medication in the morning (once in awhile forgets), " "but it won't be until end of the day she remembers to take other medications otherwise she'll forget them. Feels this is not due to cognitive difficulties but because she doesn't really like taking pills. Often is \"out of sight, out of mind.\"   Meal Planning/Preparation Reports she doesn't help much with the cooking or baking anymore. Never really enjoyed cooking but will sometimes make desserts still. If she needs to prepare a meal for herself, she keeps it very simple. \"I'm very lazy with cooking.\"   Home/Financial Management Has assistance with household management. Does light cleaning tasks like wiping down counters, vacuuming main level, dishes. But has trouble with dishes as she cannot sit while doing them so she leans over the sink to do the dishes. Also has trouble reaching up and putting dishes away into the cupboard. Also feels fatigued collecting clean dishes to be put away. Completes her own laundry which is located on main floor. Manages her own finances.   Communication/Computer Use Uses computer \"all the time\" and has a cell phone for emergencies.   Community Mobility Pt reports she quit driving last fall due to vision changes following cataract surgery.   Planned Therapy Interventions   Planned Therapy Interventions ADL training;IADL training;Community/work reintegration;Coordination training;ROM;Self care/Home management;Strengthening;Stretching;Therapeutic activities;Transfer training;Visual perception    OT Goal 1   Goal Identifier Hand Strengthening   Goal Description Pt will demonstrate improved bilateral strength by 3# pinch strength (lateral and palmar) for improved ADL/IADL completion. (e.g. grasping utensils, opening containers, holding/carrying objects).   Target Date 08/31/22    OT Goal 2   Goal Identifier UE Strengthening   Goal Description Pt will demonstrate independent completion of 3-5 activity HEP for UE strength/endurance to promote independence with ADLs/IADLs (e.g. retrieving " object from cabinet or refrigerator, microwaving food, hanging laundry, putting away dishes).   Target Date 08/31/22    OT Goal 3   Goal Identifier Fatigue Management/Sleep Hygiene   Goal Description Pt will demonstrate 3 energy conservation strategies (e.g. pacing, planning, prioritizing, sleep hygiene, etc.) to facilitate fatigue management with ADL/IADL tasks (e.g. laundry, meal preparation, walking/exercise).   Target Date 08/31/22   OT Goal 4   Goal Identifier Adaptive Equipment/Strategies   Goal Description Patient to verbalize understanding of and demonstrate use of 3 new pieces of adaptive equipment and/or adapted techniques to increase independence and safety with ADL/IADLs (dressing, bathing, fall prevention, etc.)   Target Date 08/31/22   Clinical Impression   Criteria for Skilled Therapeutic Interventions Met Yes, treatment indicated   OT Diagnosis Decreased safety and IND with ADLs/IADLs   Influenced by the following impairments strength, coordination, activity tolerance, balance, vision   Assessment of Occupational Performance 5 or more Performance Deficits   Identified Performance Deficits dressing, bathing, household management, medication management, leisure/hobbies, driving   Clinical Decision Making (Complexity) Low complexity   Therapy Frequency 1x/week   Predicted Duration of Therapy Intervention (days/wks) 12 weeks (starting with 6 weeks scheduled)   Risks and Benefits of Treatment have been explained. Yes   Patient, Family & other staff in agreement with plan of care Yes   Clinical Impression Comments Pt will benefit from skilled OT services to promote safety and IND with ADLs/IADLs   Education Assessment   Barriers To Learning No Barriers   Preferred Learning Style Listening;Reading;Demonstration;Pictures/video   Therapy Certification   Certification date from 06/08/22   Certification date to 08/31/22   Certification I certify the need for these services furnished under this plan of treatment  and while under my care.  (Physician co-signature of this document indicates review and certification of the therapy plan)   Total Evaluation Time   OT Eval, Low Complexity Minutes (65846) 45     Jocelyn Chavis, OTR/L

## 2022-06-09 NOTE — PROGRESS NOTES
[facility]                                                                                   OUTPATIENT PHYSICAL THERAPY FUNCTIONAL EVALUATION  PLAN OF TREATMENT FOR OUTPATIENT REHABILITATION  (COMPLETE FOR INITIAL CLAIMS ONLY)  Patient's Last Name, First Name, M.I.  YOB: 1940  Tricia Sosa     Provider's Name   [facility]   Medical Record No.  [id number]     Start of Care Date:  06/08/22   Onset Date:  01/01/22 (decline in function, increasing weakness starting about 6 months ago)   Type:     _X__PT   ____OT  ____SLP Medical Diagnosis:  generalized muscle weakness d/t steroid induced myopathy     PT Diagnosis:  decreased functional activity tolerance and independence. Visits from SOC:  1                              __________________________________________________________________________________  Plan of Treatment/Functional Goals:  balance training, gait training, neuromuscular re-education, strengthening, stretching, transfer training, manual therapy           GOALS  HEP  Tricia will be independent in a home ex and activity program to address her impairments and functional limitations.  08/07/22    Gait Tolerance  Tricia will be able to walk 300 feet with her cane to demonstrate increased tolerance for community mobility for attending MD appointments and shopping tasks.  08/07/22    Standing Tolerance  Tricia will be able to tolerate standing for 15 minutes in order to better tolerate household tasks and ADLs such as washing dishes, showering/self cares, etc to increase her functional independence.  08/07/22    [place]  Tricia will be able to score 46/56 or greater on the [place] to demonstrate low falls risk with daily activities.  08/07/22                                                Therapy Frequency:  1 time/week   Predicted Duration of Therapy Intervention:   60 days    Genny Daugherty, PT                                    I CERTIFY THE NEED FOR THESE SERVICES FURNISHED UNDER        THIS PLAN OF TREATMENT AND WHILE UNDER MY CARE     (Physician co-signature of this document indicates review and certification of the therapy plan).                Certification Date From:  06/08/22   Certification Date To:  08/07/22    Referring Provider:  Marley Heart MD    Initial Assessment  See Epic Evaluation- Start of Care Date: 06/08/22

## 2022-06-09 NOTE — PROGRESS NOTES
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   Onset of illness/injury or Date of Surgery 01/01/22  (decline in function, increasing weakness starting about 6 months ago)   Precautions/Limitations fall precautions   Surgical/Medical history reviewed Yes   Pertinent history of current problem (include personal factors and/or comorbidities that impact the POC) Tricia is known to this clinic from episode of care last spring/summer 2021.  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  After completion of PT last July 2021, she found it difficult to get the energy to continue with her home ex program and as a result experienced further decline in her strength and functional abilities, as well as increased falls.  she reports difficulty doing daily activities d/t no energy and has to do things at slow pace. She reports limited walking and standing tolerance.   Prior level of function comment was able to tolerate elliptical longer, tolerate home ex program.  Used to like to garden.  NOW - tried to garde (flower pots) a little bit at a time.  can only tolerate elliptical for 90 sec.  LEs get tired.  Vacuuming is ok.  balances by  touching things.  tends to catch/drag feet.  doesn't like to stand, tries to sit and do things.  doing dishes is hard.  cooking is hard d/t having to stand.  feels weak in low back.  using cane for walking.  Dr. Heart wants her to use quad cane.   Current Community Support Family/friend caregiver  (lives with Son, Dtr in law and grandkids)   Patient role/Employment history Retired   Living environment House/[facility]   Home/Community  Accessibility Comments 14 steps to basement and upper level.  with rialing.  tries  to stay on main level most of day.   Current Assistive Devices Front Wheeled Walker;Standard Cane  (stair lift)   ADL Devices Wall Grab Bar;Raised Toilet Seat;Shower/Tub Chair   Assistive Devices Comments uses cane out of the house, does not always use in house.   Patient/Family Goals Statement Be able to stand/walk longer, better balance.   General Information Comments achey in shoulders,  In middle of march, woke up with head and neck pain with shoulder aches, go terribly chilled.  since then neck has not gotten better. gets up around 3 am and moves to recliner to be closer to dog.    can't walk too far,  even about 100 feet is exhausting.   Fall Risk Screen   Fall screen completed by PT   Have you fallen 2 or more times in the past year? Yes   Have you fallen and had an injury in the past year? No   Timed Up and Go score (seconds) NT   Is patient a fall risk? Yes   Fall screen comments no significant injury, falls about every 6 months, last fall in January 2022. .   Abuse Screen (yes response referral indicated)   Feels Unsafe at Home or Work/School no   Feels Threatened by Someone no   Does Anyone Try to Keep You From Having Contact with Others or Doing Things Outside Your Home? no   Physical Signs of Abuse Present no   Pain   Patient currently in pain Yes   Pain location upper arms, neck   Pain rating not rated   Pain description Ache   Pain description comment mostly at night in shoulders, in neck all the time.  chente when turning head   Cognitive Status Examination   Orientation orientation to person, place and time   Level of Consciousness alert   Follows Commands and Answers Questions 100% of the time;able to follow multistep instructions   Personal Safety and Judgment intact   Memory intact   Cognitive Comment see OT   Posture   Posture Forward head position;Protracted shoulders   Posture Comments bilateral genu valgus    Range of Motion (ROM)   ROM Comment decreased knee extension.  decreased cervical rotatio nand SB   Strength   Strength Comments hip flexors 4-/5,  knee flex/ext 4+/5,  ankle DF 4+/5.  UEs - grossly 4-/5,  L weaker than right.  quickly fatigue with prolonged resistance.   Transfer Skills   Transfer Comments unable to rise t ostand or lower to sit without UE support.   Gait   Gait Comments symmetric step length but slow pace, fwd posture, decreased heelstrike bilaterally.   Gait Special Tests   Gait Special Tests 25 FOOT TIMED WALK   Gait Special Tests 25 Foot Timed Walk   Seconds 13.5   Steps 17 Steps   Comments no AD   Balance   Balance Comments static standing - feels wobbly strain in front of the legs.  standing feet together - one hand support to place feet, holds 30 sec but with effort.  decreased ecc control to sit.   Sensory Examination   Sensory Perception Comments peripheral neuropathy feet bilaterally   Modality Interventions   Planned Modality Interventions Comments per PT   Planned Therapy Interventions   Planned Therapy Interventions balance training;gait training;neuromuscular re-education;strengthening;stretching;transfer training;manual therapy   Clinical Impression   Criteria for Skilled Therapeutic Interventions Met yes, treatment indicated   PT Diagnosis decreased functional activity tolerance and independence.   Influenced by the following impairments core weakness, LE/UE weakness, impaired posture, genu valgus, joint crepitus (knees), deconditioning   Functional limitations due to impairments decreased standing and gait tolerance, assist needed for housekeeping tasks, decreased balance, falls.   Clinical Presentation Evolving/Changing   Clinical Presentation Rationale ongoing fluctuations in hemoglobin, intermittent need for steroids due to hemolytic anemia.   Clinical Decision Making (Complexity) Moderate complexity   Therapy Frequency 1 time/week   Predicted Duration of Therapy Intervention  (days/wks) 60 days   Risk & Benefits of therapy have been explained Yes   Patient, Family & other staff in agreement with plan of care Yes   GOALS   PT Eval Goals 1;2;3;4   Goal 1   Goal Identifier HEP   Goal Description Tricia will be independent in a home ex and activity program to address her impairments and functional limitations.   Target Date 08/07/22   Goal 2   Goal Identifier Gait Tolerance   Goal Description Tricia will be able to walk 300 feet with her cane to demonstrate increased tolerance for community mobility for attending MD appointments and shopping tasks.   Target Date 08/07/22   Goal 3   Goal Identifier Standing Tolerance   Goal Description Tricia will be able to tolerate standing for 15 minutes in order to better tolerate household tasks and ADLs such as washing dishes, showering/self cares, etc to increase her functional independence.   Target Date 08/07/22   Goal 4   Goal Identifier Griffin   Goal Description Tricia will be able to score 46/56 or greater on the Griffin to demonstrate low falls risk with daily activities.   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  (Physician co-signature of this document indicates review and certification of the therapy plan).

## 2022-06-14 ENCOUNTER — PATIENT OUTREACH (OUTPATIENT)
Dept: ONCOLOGY | Facility: CLINIC | Age: 82
End: 2022-06-14
Payer: MEDICARE

## 2022-06-14 DIAGNOSIS — D50.9 IRON DEFICIENCY ANEMIA, UNSPECIFIED IRON DEFICIENCY ANEMIA TYPE: Primary | ICD-10-CM

## 2022-06-14 DIAGNOSIS — D59.19 OTHER AUTOIMMUNE HEMOLYTIC ANEMIA (H): ICD-10-CM

## 2022-06-14 NOTE — PROGRESS NOTES
Per Dr. Boyd, ok to recheck IgG level.     Writer called Tricia and she verbalized understanding and is in agreement with the plan. She is scheduled for labs tomorrow.     Anabell Hawkins, RN, BSN, COLLEEN  RN Care Coordinator  [facility]  [phone]

## 2022-06-14 NOTE — PROGRESS NOTES
Tricia called in to clinic reporting [facility] called her to tell her her IVIG infusion is going to have to be canceled for today because her last IgG level was > 600. Tricia reports her last IgG level was drawn over a month ago so she is wondering if she can get redrawn and hopefully rescheduled for possible IVIG.     Writer will discuss with Dr. Boyd and call Tricia back.     Anabell Hawkins, RN, BSN, COLLEEN  RN Care Coordinator  [facility]  [phone]

## 2022-06-15 ENCOUNTER — HOSPITAL ENCOUNTER (OUTPATIENT)
Dept: OCCUPATIONAL THERAPY | Facility: CLINIC | Age: 82
Setting detail: THERAPIES SERIES
Discharge: HOME | End: 2022-06-15
Attending: STUDENT IN AN ORGANIZED HEALTH CARE EDUCATION/TRAINING PROGRAM
Payer: MEDICARE

## 2022-06-15 ENCOUNTER — LAB (OUTPATIENT)
Dept: ONCOLOGY | Facility: CLINIC | Age: 82
End: 2022-06-15
Attending: INTERNAL MEDICINE
Payer: MEDICARE

## 2022-06-15 ENCOUNTER — HOSPITAL ENCOUNTER (OUTPATIENT)
Dept: PHYSICAL THERAPY | Facility: CLINIC | Age: 82
Setting detail: THERAPIES SERIES
Discharge: HOME | End: 2022-06-15
Attending: STUDENT IN AN ORGANIZED HEALTH CARE EDUCATION/TRAINING PROGRAM
Payer: MEDICARE

## 2022-06-15 DIAGNOSIS — D59.19 OTHER AUTOIMMUNE HEMOLYTIC ANEMIA (H): ICD-10-CM

## 2022-06-15 LAB
ALBUMIN SERPL-MCNC: 3.9 G/DL (ref 3.4–5)
ALP SERPL-CCNC: 117 U/L (ref 40–150)
ALT SERPL W P-5'-P-CCNC: 21 U/L (ref 0–50)
ANION GAP SERPL CALCULATED.3IONS-SCNC: 5 MMOL/L (ref 3–14)
AST SERPL W P-5'-P-CCNC: 26 U/L (ref 0–45)
BASOPHILS # BLD AUTO: 0 10E3/UL (ref 0–0.2)
BASOPHILS NFR BLD AUTO: 0 %
BILIRUB SERPL-MCNC: 0.9 MG/DL (ref 0.2–1.3)
BUN SERPL-MCNC: 16 MG/DL (ref 7–30)
CALCIUM SERPL-MCNC: 8.9 MG/DL (ref 8.5–10.1)
CHLORIDE BLD-SCNC: 105 MMOL/L (ref 94–109)
CO2 SERPL-SCNC: 29 MMOL/L (ref 20–32)
CREAT SERPL-MCNC: 0.49 MG/DL (ref 0.52–1.04)
EOSINOPHIL # BLD AUTO: 0 10E3/UL (ref 0–0.7)
EOSINOPHIL NFR BLD AUTO: 1 %
ERYTHROCYTE [DISTWIDTH] IN BLOOD BY AUTOMATED COUNT: 17.8 % (ref 10–15)
GFR SERPL CREATININE-BSD FRML MDRD: >90 ML/MIN/1.73M2
GLUCOSE BLD-MCNC: 88 MG/DL (ref 70–99)
HCT VFR BLD AUTO: 35.2 % (ref 35–47)
HGB BLD-MCNC: 11.6 G/DL (ref 11.7–15.7)
IMM GRANULOCYTES # BLD: 0 10E3/UL
IMM GRANULOCYTES NFR BLD: 0 %
LDH SERPL L TO P-CCNC: 241 U/L (ref 81–234)
LYMPHOCYTES # BLD AUTO: 2.3 10E3/UL (ref 0.8–5.3)
LYMPHOCYTES NFR BLD AUTO: 38 %
MCH RBC QN AUTO: 31.8 PG (ref 26.5–33)
MCHC RBC AUTO-ENTMCNC: 33 G/DL (ref 31.5–36.5)
MCV RBC AUTO: 96 FL (ref 78–100)
MONOCYTES # BLD AUTO: 0.7 10E3/UL (ref 0–1.3)
MONOCYTES NFR BLD AUTO: 12 %
NEUTROPHILS # BLD AUTO: 2.9 10E3/UL (ref 1.6–8.3)
NEUTROPHILS NFR BLD AUTO: 49 %
NRBC # BLD AUTO: 0 10E3/UL
NRBC BLD AUTO-RTO: 0 /100
PLATELET # BLD AUTO: 143 10E3/UL (ref 150–450)
POTASSIUM BLD-SCNC: 3.8 MMOL/L (ref 3.4–5.3)
PROT SERPL-MCNC: 6.2 G/DL (ref 6.8–8.8)
RBC # BLD AUTO: 3.65 10E6/UL (ref 3.8–5.2)
RETICS # AUTO: 0.09 10E6/UL (ref 0.03–0.1)
RETICS/RBC NFR AUTO: 2.6 % (ref 0.5–2)
SODIUM SERPL-SCNC: 139 MMOL/L (ref 133–144)
WBC # BLD AUTO: 5.9 10E3/UL (ref 4–11)

## 2022-06-15 PROCEDURE — 83010 ASSAY OF HAPTOGLOBIN QUANT: CPT | Performed by: INTERNAL MEDICINE

## 2022-06-15 PROCEDURE — 97535 SELF CARE MNGMENT TRAINING: CPT | Mod: GO

## 2022-06-15 PROCEDURE — 85045 AUTOMATED RETICULOCYTE COUNT: CPT | Performed by: INTERNAL MEDICINE

## 2022-06-15 PROCEDURE — 85025 COMPLETE CBC W/AUTO DIFF WBC: CPT | Performed by: INTERNAL MEDICINE

## 2022-06-15 PROCEDURE — 97110 THERAPEUTIC EXERCISES: CPT | Mod: GP | Performed by: PHYSICAL THERAPIST

## 2022-06-15 PROCEDURE — 36415 COLL VENOUS BLD VENIPUNCTURE: CPT

## 2022-06-15 PROCEDURE — 97110 THERAPEUTIC EXERCISES: CPT | Mod: GO

## 2022-06-15 PROCEDURE — 80053 COMPREHEN METABOLIC PANEL: CPT | Performed by: INTERNAL MEDICINE

## 2022-06-15 PROCEDURE — 97750 PHYSICAL PERFORMANCE TEST: CPT | Mod: GP | Performed by: PHYSICAL THERAPIST

## 2022-06-15 PROCEDURE — 82784 ASSAY IGA/IGD/IGG/IGM EACH: CPT | Performed by: INTERNAL MEDICINE

## 2022-06-15 PROCEDURE — 83615 LACTATE (LD) (LDH) ENZYME: CPT | Performed by: INTERNAL MEDICINE

## 2022-06-16 LAB
HAPTOGLOB SERPL-MCNC: <3 MG/DL (ref 32–197)
IGA SERPL-MCNC: <2 MG/DL (ref 84–499)
IGG SERPL-MCNC: 543 MG/DL (ref 610–1616)
IGM SERPL-MCNC: <10 MG/DL (ref 35–242)

## 2022-06-16 NOTE — PROGRESS NOTES
Latest Reference Range & Units 06/15/22 11:31    - 1,616 mg/dL 543 (L)   (L): Data is abnormally low    Writer called Tricia and updated her that she now qualifies for IVIG, per Dr. Boyd. Tricia's call was forwarded to schedulers. She is now scheduled 6/24 for IVIG.     Anabell Hawkins, RN, BSN, COLLEEN  RN Care Coordinator  [facility]  [phone]

## 2022-06-20 NOTE — PROGRESS NOTES
RECORDS STATUS - ALL OTHER DIAGNOSIS      RECORDS RECEIVED FROM: [facility]   DATE RECEIVED: 8/16/2022   NOTES STATUS DETAILS   OFFICE NOTE from referring provider Complete Isaiah Reeves PA   OFFICE NOTE from medical oncologist Complete 5/19/2022    Iron deficiency anemia, unspecified iron deficiency anemia type    5/10/2022 Anemia     More in EPIC     DISCHARGE SUMMARY from hospital     DISCHARGE REPORT from the ER     MEDICATION LIST Complete [facility]   CLINICAL TRIAL TREATMENTS TO DATE     LABS     PATHOLOGY REPORTS     ANYTHING RELATED TO DIAGNOSIS Complete Labs last updated on 6/15/2022    GENONOMIC TESTING     TYPE:     IMAGING (NEED IMAGES & REPORT)     CT SCANS     MRI     MAMMO     ULTRASOUND     PET

## 2022-06-22 ENCOUNTER — HOSPITAL ENCOUNTER (OUTPATIENT)
Dept: PHYSICAL THERAPY | Facility: CLINIC | Age: 82
Setting detail: THERAPIES SERIES
Discharge: HOME | End: 2022-06-22
Attending: STUDENT IN AN ORGANIZED HEALTH CARE EDUCATION/TRAINING PROGRAM
Payer: MEDICARE

## 2022-06-22 ENCOUNTER — HOSPITAL ENCOUNTER (OUTPATIENT)
Dept: OCCUPATIONAL THERAPY | Facility: CLINIC | Age: 82
Setting detail: THERAPIES SERIES
Discharge: HOME | End: 2022-06-22
Attending: STUDENT IN AN ORGANIZED HEALTH CARE EDUCATION/TRAINING PROGRAM
Payer: MEDICARE

## 2022-06-22 PROCEDURE — 97110 THERAPEUTIC EXERCISES: CPT | Mod: GP | Performed by: PHYSICAL THERAPIST

## 2022-06-22 PROCEDURE — 97110 THERAPEUTIC EXERCISES: CPT | Mod: GO

## 2022-06-22 PROCEDURE — 97535 SELF CARE MNGMENT TRAINING: CPT | Mod: GO

## 2022-06-23 ENCOUNTER — HOSPITAL ENCOUNTER (OUTPATIENT)
Dept: MAMMOGRAPHY | Facility: CLINIC | Age: 82
Discharge: HOME | End: 2022-06-23
Attending: INTERNAL MEDICINE | Admitting: INTERNAL MEDICINE
Payer: MEDICARE

## 2022-06-23 DIAGNOSIS — Z12.31 VISIT FOR SCREENING MAMMOGRAM: ICD-10-CM

## 2022-06-23 PROCEDURE — 77067 SCR MAMMO BI INCL CAD: CPT

## 2022-06-24 ENCOUNTER — INFUSION THERAPY VISIT (OUTPATIENT)
Dept: INFUSION THERAPY | Facility: CLINIC | Age: 82
End: 2022-06-24
Payer: MEDICARE

## 2022-06-24 VITALS
HEART RATE: 78 BPM | RESPIRATION RATE: 16 BRPM | SYSTOLIC BLOOD PRESSURE: 108 MMHG | TEMPERATURE: 97.8 F | BODY MASS INDEX: 20.85 KG/M2 | DIASTOLIC BLOOD PRESSURE: 54 MMHG | OXYGEN SATURATION: 97 % | WEIGHT: 153.7 LBS

## 2022-06-24 DIAGNOSIS — D80.3 SELECTIVE DEFICIENCY OF IGG SUBCLASSES (H): Primary | ICD-10-CM

## 2022-06-24 DIAGNOSIS — D59.19 OTHER AUTOIMMUNE HEMOLYTIC ANEMIA (H): ICD-10-CM

## 2022-06-24 DIAGNOSIS — D59.10 AUTOIMMUNE HEMOLYTIC ANEMIA (H): ICD-10-CM

## 2022-06-24 DIAGNOSIS — D83.9 CVID (COMMON VARIABLE IMMUNODEFICIENCY) (H): ICD-10-CM

## 2022-06-24 PROCEDURE — 96366 THER/PROPH/DIAG IV INF ADDON: CPT

## 2022-06-24 PROCEDURE — 96375 TX/PRO/DX INJ NEW DRUG ADDON: CPT

## 2022-06-24 PROCEDURE — 250N000011 HC RX IP 250 OP 636: Performed by: INTERNAL MEDICINE

## 2022-06-24 PROCEDURE — 96365 THER/PROPH/DIAG IV INF INIT: CPT

## 2022-06-24 PROCEDURE — 250N000011 HC RX IP 250 OP 636: Performed by: PHYSICIAN ASSISTANT

## 2022-06-24 RX ORDER — HEPARIN SODIUM,PORCINE 10 UNIT/ML
5 VIAL (ML) INTRAVENOUS
Status: CANCELLED | OUTPATIENT
Start: 2022-07-22

## 2022-06-24 RX ORDER — DIPHENHYDRAMINE HCL 25 MG
50 CAPSULE ORAL ONCE
Status: CANCELLED
Start: 2022-07-22

## 2022-06-24 RX ORDER — MEPERIDINE HYDROCHLORIDE 25 MG/ML
25 INJECTION INTRAMUSCULAR; INTRAVENOUS; SUBCUTANEOUS EVERY 30 MIN PRN
Status: CANCELLED | OUTPATIENT
Start: 2022-07-22

## 2022-06-24 RX ORDER — ACETAMINOPHEN 325 MG/1
650 TABLET ORAL ONCE
Status: CANCELLED
Start: 2022-07-22

## 2022-06-24 RX ORDER — DIPHENHYDRAMINE HYDROCHLORIDE 50 MG/ML
50 INJECTION INTRAMUSCULAR; INTRAVENOUS
Status: CANCELLED
Start: 2022-07-22

## 2022-06-24 RX ORDER — NALOXONE HYDROCHLORIDE 0.4 MG/ML
0.2 INJECTION, SOLUTION INTRAMUSCULAR; INTRAVENOUS; SUBCUTANEOUS
Status: CANCELLED | OUTPATIENT
Start: 2022-07-22

## 2022-06-24 RX ORDER — ALBUTEROL SULFATE 0.83 MG/ML
2.5 SOLUTION RESPIRATORY (INHALATION)
Status: CANCELLED | OUTPATIENT
Start: 2022-07-22

## 2022-06-24 RX ORDER — METHYLPREDNISOLONE SODIUM SUCCINATE 125 MG/2ML
125 INJECTION, POWDER, LYOPHILIZED, FOR SOLUTION INTRAMUSCULAR; INTRAVENOUS
Status: CANCELLED
Start: 2022-07-22

## 2022-06-24 RX ORDER — HEPARIN SODIUM (PORCINE) LOCK FLUSH IV SOLN 100 UNIT/ML 100 UNIT/ML
5 SOLUTION INTRAVENOUS
Status: CANCELLED | OUTPATIENT
Start: 2022-07-22

## 2022-06-24 RX ORDER — ALBUTEROL SULFATE 90 UG/1
1-2 AEROSOL, METERED RESPIRATORY (INHALATION)
Status: CANCELLED
Start: 2022-07-22

## 2022-06-24 RX ORDER — EPINEPHRINE 1 MG/ML
0.3 INJECTION, SOLUTION INTRAMUSCULAR; SUBCUTANEOUS EVERY 5 MIN PRN
Status: CANCELLED | OUTPATIENT
Start: 2022-07-22

## 2022-06-24 RX ADMIN — HUMAN IMMUNOGLOBULIN G 40 G: 40 LIQUID INTRAVENOUS at 10:05

## 2022-06-24 RX ADMIN — HYDROCORTISONE SODIUM SUCCINATE 100 MG: 100 INJECTION, POWDER, FOR SOLUTION INTRAMUSCULAR; INTRAVENOUS at 10:00

## 2022-06-24 NOTE — PROGRESS NOTES
Infusion Nursing Note:  Maandrew Sosa presents today for IVIG.    Patient seen by provider today: No   present during visit today: Not Applicable.    Note: Patient declined tylenol and benadryl premeds, did give steroid.    Intravenous Access:  Peripheral IV placed.    Treatment Conditions:  Biological Infusion Checklist:  ~~~ NOTE: If the patient answers yes to any of the questions below, hold the infusion and contact ordering provider or on-call provider.    1. Have you recently had an elevated temperature, fever, chills, productive cough, coughing for 3 weeks or longer or hemoptysis, abnormal vital signs, night sweats,  chest pain or have you noticed a decrease in your appetite, unexplained weight loss or fatigue? No  2. Do you have any open wounds or new incisions? No  3. Do you have any recent or upcoming hospitalizations, surgeries or dental procedures? No  4. Do you currently have or recently have had any signs of illness or infection or are you on any antibiotics? No  5. Have you had any new, sudden or worsening abdominal pain? No  6. Have you or anyone in your household received a live vaccination in the past 4 weeks? Please note:  No live vaccines while on biologic/chemotherapy until 6 months after the last treatment.  Patient can receive the flu vaccine (shot only) and the pneumovax.  It is optimal for the patient to get these vaccines mid cycle, but they can be given at any time as long as it is not on the day of the infusion. No  7. Have you recently been diagnosed with any new nervous system diseases (ie. Multiple sclerosis, Guillain [place], seizures, neurological changes) or cancer diagnosis? No  8. Are you on any form of radiation or chemotherapy? No  9. Are you pregnant or breast feeding or do you have plans of pregnancy in the future? No  10. Have you been having any signs of worsening depression or suicidal ideations?  (benlysta only) No  11. Have there been any other new onset medical  symptoms? No      Post Infusion Assessment:  Patient tolerated infusion without incident.  Blood return noted pre and post infusion.  Site patent and intact, free from redness, edema or discomfort.  No evidence of extravasations.  Access discontinued per protocol.  Biologic Infusion Post Education: Call the triage nurse at your clinic or seek medical attention if you have chills and/or temperature greater than or equal to 100.5, uncontrolled nausea/vomiting, diarrhea, constipation, dizziness, shortness of breath, chest pain, heart palpitations, weakness or any other new or concerning symptoms, questions or concerns.  You cannot have any live virus vaccines prior to or during treatment or up to 6 months post infusion.  If you have an upcoming surgery, medical procedure or dental procedure during treatment, this should be discussed with your ordering physician and your surgeon/dentist.  If you are having any concerning symptom, if you are unsure if you should get your next infusion or wish to speak to a provider before your next infusion, please call your care coordinator or triage nurse at your clinic to notify them so we can adequately serve you.     Discharge Plan:   AVS to patient via [employer].  Patient will return 7/25/22 for labs, Dr. Ryder, and Rit[place]an for next appointment.   Patient discharged in stable condition accompanied by: self.  Departure Mode: Ambulatory.      Ally Adams RN

## 2022-07-21 ENCOUNTER — TELEPHONE (OUTPATIENT)
Dept: INTERNAL MEDICINE | Facility: CLINIC | Age: 82
End: 2022-07-21

## 2022-07-21 ENCOUNTER — HOSPITAL ENCOUNTER (OUTPATIENT)
Dept: PHYSICAL THERAPY | Facility: CLINIC | Age: 82
Setting detail: THERAPIES SERIES
Discharge: HOME | End: 2022-07-21
Attending: STUDENT IN AN ORGANIZED HEALTH CARE EDUCATION/TRAINING PROGRAM
Payer: MEDICARE

## 2022-07-21 ENCOUNTER — HOSPITAL ENCOUNTER (OUTPATIENT)
Dept: OCCUPATIONAL THERAPY | Facility: CLINIC | Age: 82
Setting detail: THERAPIES SERIES
Discharge: HOME | End: 2022-07-21
Attending: STUDENT IN AN ORGANIZED HEALTH CARE EDUCATION/TRAINING PROGRAM
Payer: MEDICARE

## 2022-07-21 PROCEDURE — 97110 THERAPEUTIC EXERCISES: CPT | Mod: GO

## 2022-07-21 PROCEDURE — 97535 SELF CARE MNGMENT TRAINING: CPT | Mod: GO

## 2022-07-21 PROCEDURE — 97110 THERAPEUTIC EXERCISES: CPT | Mod: GP | Performed by: PHYSICAL THERAPIST

## 2022-07-21 PROCEDURE — 97750 PHYSICAL PERFORMANCE TEST: CPT | Mod: GP | Performed by: PHYSICAL THERAPIST

## 2022-07-21 PROCEDURE — 97116 GAIT TRAINING THERAPY: CPT | Mod: GP | Performed by: PHYSICAL THERAPIST

## 2022-07-21 NOTE — TELEPHONE ENCOUNTER
See message below. She had this back in November and went to .     Last OV on 6/22/21. Pt has appt scheduled in Sept with Patricia.      Please advise.

## 2022-07-21 NOTE — TELEPHONE ENCOUNTER
Patient calling. This is the second time she has a  lump on her right elbow. She has had this since June 15th. Please advise what her next step. Ok to call and robert [phone]

## 2022-07-21 NOTE — TELEPHONE ENCOUNTER
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  These can be very slow to go away.  We generally do not do anything to them specifically to make them go away, injecting him has too much risk of getting infection and chronic drainage.  It is most important to prevent putting any pressure on it.  That means no leaning on the elbow but also no leaning on the forearm like putting it on the arm of the chair.  If it becomes extremely painful, worsening swelling and redness and thickening of the skin, that might indicate infection and she would need to be seen.

## 2022-07-21 NOTE — PROGRESS NOTES
[facility]    Outpatient Occupational Therapy Progress Note  Patient: Tricia Sosa  : 1940    Beginning/End Dates of Reporting Period:  22 to 22    Referring Provider: Marley Heart MD    Therapy Diagnosis: Decreased safety and IND with ADLs/IADLs    Client Self Report: Pt reports her 4 weeks off without apopintments went really quickly. Feels she didn't get nearly as much done as she had planned or hoped. Reports putty is working well when she is watching TV at night. 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 Reports she has had anemia and SOB for so long but is feeling fairly well. Notes she has labs and an infusion coming up on Monday. Notes energy is more than a few months ago but chores still tire her out.    Objective Measurements:     Objective Measure: MMT   Details: With MMT of BUEs, including shoulder flexion/extension, abduction/adduction, external/internal rotation, and elbow and wrist flexion/extension, pt tests with 3+/5 L shoulder flexion and abduction and R shoulder abduction, 4/5 strength R shoulder flexion and L shoulder external rotation, and 5/5 strength in all other areas tested. Reports significant effort required and fatigue following testing.        Goals:     Goal Identifier Hand Strengthening   Goal Description Pt will demonstrate improved bilateral strength by 3# pinch strength (lateral and palmar) for improved ADL/IADL completion. (e.g. grasping utensils, opening containers, holding/carrying objects).   Target Date 22   Date Met      Progress (detail required for progress note):  Goal progressing. Pt educated in UE hand strengthening HEP with red theraputty and rubber band to further progress hand strength with pt demonstrating adherence to HEP but requiring min VC for form/pacing this date.  Previously provided with blue foam block for grasp and pinch strength. Pt to continue with HEP and will plan to reassess and further progress in future session prn.     Goal Identifier UE Strengthening   Goal Description Pt will demonstrate independent completion of 3-5 activity HEP for UE strength/endurance to promote independence with ADLs/IADLs (e.g. retrieving object from cabinet or refrigerator, microwaving food, hanging laundry, putting away dishes).   Target Date 08/31/22   Date Met      Progress (detail required for progress note):  Goal progressing. Pt educated in UE hand strengthening HEP with red theraputty and rubber band to further progress hand strength with pt demonstrating adherence to HEP but requiring min VC for form/pacing this date. Completed MMT of BUEs and plan to further progress UE strengthening in future sessions for IND with ADLs/IADLs.     Goal Identifier Fatigue Management/Sleep Hygiene   Goal Description Pt will demonstrate 3 energy conservation strategies (e.g. pacing, planning, prioritizing, sleep hygiene, etc.) to facilitate fatigue management with ADL/IADL tasks (e.g. laundry, meal preparation, walking/exercise).   Target Date 08/31/22   Date Met      Progress (detail required for progress note):  Goal progressing. Pt educated in basic fatigue management principles including planning, prioritizing, pacing, and posture strategies to promote IND with ADLs/IADLs. Overall, pt feeling like her back pain/discomfort may be a largest contributor to fatigue/difficulty with activity tolerance. Will continue to monitor and address additional concerns to promote IND with ADLs/IADLs.     Goal Identifier Adaptive Equipment/Strategies   Goal Description Patient to verbalize understanding of and demonstrate use of 3 new pieces of adaptive equipment and/or adapted techniques to increase independence and safety with ADL/IADLs (dressing, bathing, fall prevention, etc.)   Target Date 08/31/22   Date Met       Progress (detail required for progress note):  Goal progressing. Educated in adaptive strategies for completing vacuuming, sweeping, and opening of dog food containers, including modifying technique (walking vs elbow flexion/extension), pacing (breaking down task into multiple steps or timed intervals with breaks), and using can opener (manual or electric prn). Trained in cervical ROM exercises to relieve cervical/shoulder muscle tension impacting ability to complete ADLs/IADLs with ease and efficiency. Will continue to assess needs and further address in upcoming sessions as needed.       Plan:  Continue therapy per current plan of care. Pt has been seen for a total of 3 sessions with a focus on the above goal areas (see progress noted above). Pt remains appropriate for skilled OT services 1x/week per current POC.    Discharge:  No    EULALIA Antonio/JASWINDER

## 2022-07-24 NOTE — PROGRESS NOTES
[facility]    Hematology/Oncology Established Patient Follow-up Note    Treatment Summary:      Today's Date: 7/25/22    Reason for Follow-up: Hemolytic anemia-autoimmune; IgA deficiency     HISTORY OF PRESENT ILLNESS: Tricia Sosa is a 81 year old female who presents with autoimmune hemolytic anemia and IgA deficiency.  She previously saw Dr. Matos and then Dr. Summers.  She was previously seen at [facility].     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  She was very sick at the time and had severe diarrhea.  She lost about 40 pounds in weight.  The workup revealed that she had markedly diminished CD4 counts of 48 and was diagnosed with CMV colitis.  Since then she has been followed in hematology clinic at [place] until recently.  She was noted to have anemia which was fairly long-standing.  She was initially referred for anemia in 2004 and also had a bone marrow biopsy done at that time which revealed hypercellular bone marrow with 70-80% cellularity and normal trilineage hematopoiesis and no morphologic features of MDS or lymphoproliferation.  Her anemia was attributed to her chronic underlying disease.  At the next evaluation in 2002 on 4 aggressive anemia there was no evidence of hemolysis, iron deficiency, B12 or folate deficiency.  Bone marrow biopsy was not pursued.  In summer of 2015 she had progressive fatigue, dyspnea on exertion and worsening anemia with a hemoglobin now of 9.  Workup at this time did suggest a hemolytic anemia with elevated LDH at 709, undetectable haptoglobin and elevated unconjugated bilirubin at 3.3.  Monospecific MARIKA was positive for both IgG and complement.  Cold agglutinin screen was positive with very low titer 1:64.  She was started on prednisone 60 mg daily with supplementation of iron folate and B12 starting 7/31/15.  Her prednisone has been slowly tapered and was discontinued off in January 2016.  She was last  followed at [facility] on March 10, 2016 when she had stable hemoglobin.     She was followed by Dr. Matos and Dr. Summers.  Due to relapse of hemolytic anemia, she underwent another course of prednisone that has since been tapered off and rituximab x 4 weekly doses completed in 9/19/19.     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  She started weekly Rituxan on 7/31/20 x 4 doses, completed on 8/21/20.  She tapered off of prednisone in October 2020.     Due to relapse of her AIHA, she started prednisone again on 6/8/21 at 60 mg daily with slow taper and finished taper in August 2021.     Due to relapse of AIHA again, she started prednisone again on 11/2/21 at 70 mg daily with slow taper.  She also completed weekly Rituxan again x 4 doses 12/6/21-12/29/21.    INTERIM HISTORY:  Tricia feels okay.  She last received rituximab on 5/19/22. She continues now on maintenance every 2 months.     Venofer 5/5-5/19.    IVIG was last given on 6/24/22.     No weight loss, fatigue, LAD, night sweats, fever. No gross evidence of bleed.     REVIEW OF SYSTEMS:   A 14 point ROS was reviewed with pertinent positives and negatives in the HPI.       HOME MEDICATIONS:  Current Outpatient Medications   Medication Sig Dispense Refill     cyanocobalamin (VITAMIN  B-12) 1000 MCG tablet   3     Flaxseed, Linseed, (FLAX SEEDS PO) Take by mouth daily       folic acid (FOLVITE) 1 MG tablet   3     hydrochlorothiazide (HYDRODIURIL) 12.5 MG tablet Take 1 tablet (12.5 mg) by mouth daily as needed (edema) 30 tablet 5     ketoconazole (NIZORAL) 2 % external shampoo Use every other day to scalp as needed 120 mL 11     levothyroxine (SYNTHROID/LEVOTHROID) 150 MCG tablet Take 1 tablet (150 mcg) by mouth daily 90 tablet 3     sulfamethoxazole-trimethoprim (BACTRIM DS) 800-160 MG tablet Take 1 pill orally on Mon, Wed and Friday 45 tablet 3     UNABLE TO FIND privigen inj every two months            ALLERGIES:  Allergies   Allergen Reactions     Doxycycline          PAST MEDICAL HISTORY:  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, and posterior vitreous detachment     Urinary tract infection, site not specified     Recurrent UTI's         PAST SURGICAL HISTORY:  Past Surgical History:   Procedure Laterality Date     COLONOSCOPY N/A 4/26/2016    Procedure: COLONOSCOPY;  Surgeon: Dontae Del Rio MD;  Location:  GI     ENT SURGERY  1985    Thyroid lobectomy     EYE SURGERY Bilateral 04/20/2021    Cataract Surgery     HC CYSTOSCOPY,INSERT URETERAL STENT      Ureteral stent insertion     HC FLEX SIGMOIDOSCOPY W BIOPSY  5/30/00      LAPAROSCOPY, SURGICAL; CHOLECYSTECTOMY  1992      THYROIDECTOMY       LIGATION OF HEMORRHOID(S)      Hemorrhoidectomy     UPPER GI ENDOSCOPY,BIOPSY  10/01/01     ZZC LIGATE FALLOPIAN TUBE       ZZHC COLONOSCOPY W BIOPSY  4/26/00     ZZHC COLONOSCOPY W BIOPSY  10/8/03     ZZHC COLONOSCOPY W BIOPSY  6/27/05    REPEAT IN 5 YEARS         SOCIAL HISTORY:  Social History     Socioeconomic History     Marital status:      Spouse name: Not on file     Number of children: Not on file     Years of education: Not on file     Highest education level: Not on file   Occupational History     Not on file   Tobacco Use     Smoking status: Never Smoker     Smokeless tobacco: Never Used   Substance and Sexual Activity      Alcohol use: Yes     Alcohol/week: 0.0 standard drinks     Comment: 1 a day at most     Drug use: No     Sexual activity: Not Currently     Partners: Male   Other Topics Concern     Parent/sibling w/ CABG, MI or angioplasty before 65F 55M? No   Social History Narrative     Not on file     Social Determinants of Health     Financial Resource Strain: Not on file   Food Insecurity: Not on file   Transportation Needs: Not on file   Physical Activity: Not on file   Stress: Not on file   Social Connections: Not on file   Intimate Partner Violence: Not At Risk     Fear of Current or Ex-Partner: No     Emotionally Abused: No     Physically Abused: No     Sexually Abused: No   Housing Stability: Not on file         FAMILY HISTORY:  Family History   Problem Relation Age of Onset     Colon Cancer Mother 90     Cerebrovascular Disease Father          at age 85     Dementia Brother      Prostate Cancer Brother      Thyroid Disease Brother      Dementia Brother      Thyroid Disease Brother      Pancreatic Cancer Brother      Breast Cancer Sister      Hyperlipidemia Sister      Depression Sister      Anxiety Disorder Sister      Thyroid Disease Sister      Breast Cancer Sister      Colon Cancer Niece      Other Cancer Niece         leukemia         PHYSICAL EXAM:  Vital signs:  /68   Pulse 67   Temp 97.6  F (36.4  C) (Tympanic)   Resp 16   Ht 1.829 m (6')   Wt 69.4 kg (152 lb 14.4 oz)   LMP  (LMP Unknown)   SpO2 99%   BMI 20.74 kg/m     ECO  GENERAL/CONSTITUTIONAL: No acute distress.  EYES: No scleral icterus.  NEUROLOGIC: Alert, oriented, answers questions appropriately.  INTEGUMENTARY: No jaundice.    LABS:  CBC RESULTS:   Recent Labs   Lab Test 06/15/22  1131   WBC 5.9   RBC 3.65*   HGB 11.6*   HCT 35.2   MCV 96   MCH 31.8   MCHC 33.0   RDW 17.8*   *       Recent Labs   Lab Test 06/15/22  1131 22  0925    138   POTASSIUM 3.8 3.8   CHLORIDE 105 109   CO2 29 25   ANIONGAP 5 4   GLC 88 140*    BUN 16 16   CR 0.49* 0.55   CULLEN 8.9 8.1*         IMAGING:  BILATERAL FULL FIELD DIGITAL SCREENING MAMMOGRAM WITH TOMOSYNTHESIS     Performed on: 6/23/22     Compared to: 05/25/2021 and 11/10/2017     Technique:  This study was evaluated with the assistance of Computer-Aided Detection.  Breast Tomosynthesis was used in interpretation.     Findings: The breasts are heterogeneously dense, which may obscure small masses.  There is no radiographic evidence of malignancy.      IMPRESSION: ACR BI-RADS Category 1: Negative     RECOMMENDED FOLLOW-UP: Annual routine screening mammogram      ASSESSMENT/PLAN:  Tricia Sosa is a 81 year old female with:        1. 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 Common variable immunodeficiency disorder  4. Splenomegaly           1) Autoimmune hemolytic anemia: She had relapse in July 2020, and started on prednisone, as well as weekly rituximab infusions x 4, completed on 8/21/20.  Since then, she has been off and on prednisone and rituximab, getting response each time, but relapses are getting more frequent.       She does not want to consider splenectomy yet.      She is completing another 4 doses of weekly rituxumab in May 2022, and then will go on maintenance rituximab to try to maintain her hemoglobin longer.  She wants to try to avoid having to go back on steroids if possible.     -She has tapered off of prednisone again as of early March 2022.    -Hemoglobin improved to 11.8 today.  -Monitor CBC monthly.  -She takes Bactrim for prophylaxis when she is on prednisone - currently off  -Continue maintenance rituximab every 2 months.     2) CVID:   -Continue IVIG.  She gets it, if IgG is <600.  It's been about every other month.    -IgG check and IVIG every 4 weeks if meets parameters  -She saw immunology in [place] who recommended IVIG monthly, but our financial team continues to  check and says that it is only covered with IgG is >600.      3) Iron-deficiency anemia  -s/p Venofer in May 2022.  -Recheck in 2 months.     4) Follow up in 2 months with next date of rituximab. IVIG due in August 2022.        Leandra Ryder DO  Hematology/Oncology  [facility]

## 2022-07-25 ENCOUNTER — ONCOLOGY VISIT (OUTPATIENT)
Dept: ONCOLOGY | Facility: CLINIC | Age: 82
End: 2022-07-25
Attending: INTERNAL MEDICINE
Payer: MEDICARE

## 2022-07-25 ENCOUNTER — LAB (OUTPATIENT)
Dept: INFUSION THERAPY | Facility: CLINIC | Age: 82
End: 2022-07-25
Attending: INTERNAL MEDICINE
Payer: MEDICARE

## 2022-07-25 VITALS
DIASTOLIC BLOOD PRESSURE: 68 MMHG | SYSTOLIC BLOOD PRESSURE: 133 MMHG | HEART RATE: 67 BPM | OXYGEN SATURATION: 99 % | WEIGHT: 152.9 LBS | TEMPERATURE: 97.6 F | BODY MASS INDEX: 20.71 KG/M2 | HEIGHT: 72 IN | RESPIRATION RATE: 16 BRPM

## 2022-07-25 DIAGNOSIS — D59.10 AUTOIMMUNE HEMOLYTIC ANEMIA (H): Primary | ICD-10-CM

## 2022-07-25 DIAGNOSIS — D50.9 IRON DEFICIENCY ANEMIA, UNSPECIFIED IRON DEFICIENCY ANEMIA TYPE: ICD-10-CM

## 2022-07-25 DIAGNOSIS — D83.9 CVID (COMMON VARIABLE IMMUNODEFICIENCY) (H): ICD-10-CM

## 2022-07-25 DIAGNOSIS — D59.19 OTHER AUTOIMMUNE HEMOLYTIC ANEMIA (H): Primary | ICD-10-CM

## 2022-07-25 DIAGNOSIS — D59.19 OTHER AUTOIMMUNE HEMOLYTIC ANEMIA (H): ICD-10-CM

## 2022-07-25 LAB
ALBUMIN SERPL-MCNC: 3.9 G/DL (ref 3.4–5)
ALP SERPL-CCNC: 130 U/L (ref 40–150)
ALT SERPL W P-5'-P-CCNC: 21 U/L (ref 0–50)
ANION GAP SERPL CALCULATED.3IONS-SCNC: 3 MMOL/L (ref 3–14)
AST SERPL W P-5'-P-CCNC: 26 U/L (ref 0–45)
BASOPHILS # BLD AUTO: 0 10E3/UL (ref 0–0.2)
BASOPHILS NFR BLD AUTO: 0 %
BILIRUB SERPL-MCNC: 1 MG/DL (ref 0.2–1.3)
BUN SERPL-MCNC: 13 MG/DL (ref 7–30)
CALCIUM SERPL-MCNC: 8.6 MG/DL (ref 8.5–10.1)
CHLORIDE BLD-SCNC: 107 MMOL/L (ref 94–109)
CO2 SERPL-SCNC: 29 MMOL/L (ref 20–32)
CREAT SERPL-MCNC: 0.59 MG/DL (ref 0.52–1.04)
EOSINOPHIL # BLD AUTO: 0 10E3/UL (ref 0–0.7)
EOSINOPHIL NFR BLD AUTO: 1 %
ERYTHROCYTE [DISTWIDTH] IN BLOOD BY AUTOMATED COUNT: 15.3 % (ref 10–15)
FERRITIN SERPL-MCNC: 36 NG/ML (ref 8–252)
GFR SERPL CREATININE-BSD FRML MDRD: 90 ML/MIN/1.73M2
GLUCOSE BLD-MCNC: 85 MG/DL (ref 70–99)
HBV SURFACE AG SERPL QL IA: NONREACTIVE
HCT VFR BLD AUTO: 34.4 % (ref 35–47)
HGB BLD-MCNC: 11.8 G/DL (ref 11.7–15.7)
IMM GRANULOCYTES # BLD: 0 10E3/UL
IMM GRANULOCYTES NFR BLD: 0 %
IRON SATN MFR SERPL: 36 % (ref 15–46)
IRON SERPL-MCNC: 110 UG/DL (ref 35–180)
LDH SERPL L TO P-CCNC: 243 U/L (ref 81–234)
LYMPHOCYTES # BLD AUTO: 2.3 10E3/UL (ref 0.8–5.3)
LYMPHOCYTES NFR BLD AUTO: 41 %
MCH RBC QN AUTO: 34.7 PG (ref 26.5–33)
MCHC RBC AUTO-ENTMCNC: 34.3 G/DL (ref 31.5–36.5)
MCV RBC AUTO: 101 FL (ref 78–100)
MONOCYTES # BLD AUTO: 0.6 10E3/UL (ref 0–1.3)
MONOCYTES NFR BLD AUTO: 11 %
NEUTROPHILS # BLD AUTO: 2.6 10E3/UL (ref 1.6–8.3)
NEUTROPHILS NFR BLD AUTO: 47 %
NRBC # BLD AUTO: 0 10E3/UL
NRBC BLD AUTO-RTO: 0 /100
PLATELET # BLD AUTO: 138 10E3/UL (ref 150–450)
POTASSIUM BLD-SCNC: 3.9 MMOL/L (ref 3.4–5.3)
PROT SERPL-MCNC: 6.4 G/DL (ref 6.8–8.8)
RBC # BLD AUTO: 3.4 10E6/UL (ref 3.8–5.2)
RETICS # AUTO: 0.12 10E6/UL (ref 0.03–0.1)
RETICS/RBC NFR AUTO: 3.5 % (ref 0.5–2)
SODIUM SERPL-SCNC: 139 MMOL/L (ref 133–144)
TIBC SERPL-MCNC: 308 UG/DL (ref 240–430)
WBC # BLD AUTO: 5.5 10E3/UL (ref 4–11)

## 2022-07-25 PROCEDURE — 36415 COLL VENOUS BLD VENIPUNCTURE: CPT

## 2022-07-25 PROCEDURE — 85018 HEMOGLOBIN: CPT | Performed by: INTERNAL MEDICINE

## 2022-07-25 PROCEDURE — 250N000011 HC RX IP 250 OP 636: Performed by: INTERNAL MEDICINE

## 2022-07-25 PROCEDURE — 258N000003 HC RX IP 258 OP 636: Performed by: INTERNAL MEDICINE

## 2022-07-25 PROCEDURE — 82728 ASSAY OF FERRITIN: CPT | Performed by: INTERNAL MEDICINE

## 2022-07-25 PROCEDURE — 96415 CHEMO IV INFUSION ADDL HR: CPT

## 2022-07-25 PROCEDURE — 87340 HEPATITIS B SURFACE AG IA: CPT | Mod: GZ | Performed by: INTERNAL MEDICINE

## 2022-07-25 PROCEDURE — 86704 HEP B CORE ANTIBODY TOTAL: CPT | Performed by: INTERNAL MEDICINE

## 2022-07-25 PROCEDURE — 80053 COMPREHEN METABOLIC PANEL: CPT | Performed by: INTERNAL MEDICINE

## 2022-07-25 PROCEDURE — 250N000013 HC RX MED GY IP 250 OP 250 PS 637: Performed by: INTERNAL MEDICINE

## 2022-07-25 PROCEDURE — 96413 CHEMO IV INFUSION 1 HR: CPT

## 2022-07-25 PROCEDURE — 83010 ASSAY OF HAPTOGLOBIN QUANT: CPT | Performed by: INTERNAL MEDICINE

## 2022-07-25 PROCEDURE — 85045 AUTOMATED RETICULOCYTE COUNT: CPT | Performed by: INTERNAL MEDICINE

## 2022-07-25 PROCEDURE — 99214 OFFICE O/P EST MOD 30 MIN: CPT | Performed by: INTERNAL MEDICINE

## 2022-07-25 PROCEDURE — 82784 ASSAY IGA/IGD/IGG/IGM EACH: CPT | Performed by: INTERNAL MEDICINE

## 2022-07-25 PROCEDURE — 83615 LACTATE (LD) (LDH) ENZYME: CPT | Performed by: INTERNAL MEDICINE

## 2022-07-25 PROCEDURE — 83550 IRON BINDING TEST: CPT | Performed by: INTERNAL MEDICINE

## 2022-07-25 PROCEDURE — G0463 HOSPITAL OUTPT CLINIC VISIT: HCPCS | Mod: 25

## 2022-07-25 RX ORDER — EPINEPHRINE 1 MG/ML
0.3 INJECTION, SOLUTION INTRAMUSCULAR; SUBCUTANEOUS EVERY 5 MIN PRN
Status: CANCELLED | OUTPATIENT
Start: 2022-09-13

## 2022-07-25 RX ORDER — ACETAMINOPHEN 325 MG/1
650 TABLET ORAL ONCE
Status: CANCELLED
Start: 2022-07-25

## 2022-07-25 RX ORDER — NALOXONE HYDROCHLORIDE 0.4 MG/ML
0.2 INJECTION, SOLUTION INTRAMUSCULAR; INTRAVENOUS; SUBCUTANEOUS
Status: CANCELLED | OUTPATIENT
Start: 2022-07-25

## 2022-07-25 RX ORDER — HEPARIN SODIUM,PORCINE 10 UNIT/ML
5 VIAL (ML) INTRAVENOUS
Status: CANCELLED | OUTPATIENT
Start: 2022-07-25

## 2022-07-25 RX ORDER — METHYLPREDNISOLONE SODIUM SUCCINATE 125 MG/2ML
125 INJECTION, POWDER, LYOPHILIZED, FOR SOLUTION INTRAMUSCULAR; INTRAVENOUS
Status: CANCELLED
Start: 2022-07-25

## 2022-07-25 RX ORDER — MEPERIDINE HYDROCHLORIDE 25 MG/ML
25 INJECTION INTRAMUSCULAR; INTRAVENOUS; SUBCUTANEOUS EVERY 30 MIN PRN
Status: CANCELLED | OUTPATIENT
Start: 2022-07-25

## 2022-07-25 RX ORDER — METHYLPREDNISOLONE SODIUM SUCCINATE 125 MG/2ML
125 INJECTION, POWDER, LYOPHILIZED, FOR SOLUTION INTRAMUSCULAR; INTRAVENOUS
Status: CANCELLED
Start: 2022-09-13

## 2022-07-25 RX ORDER — DIPHENHYDRAMINE HCL 25 MG
50 CAPSULE ORAL ONCE
Status: CANCELLED
Start: 2022-07-25

## 2022-07-25 RX ORDER — ALBUTEROL SULFATE 90 UG/1
1-2 AEROSOL, METERED RESPIRATORY (INHALATION)
Status: CANCELLED
Start: 2022-08-15

## 2022-07-25 RX ORDER — ACETAMINOPHEN 325 MG/1
650 TABLET ORAL ONCE
Status: CANCELLED
Start: 2022-09-13

## 2022-07-25 RX ORDER — ALBUTEROL SULFATE 0.83 MG/ML
2.5 SOLUTION RESPIRATORY (INHALATION)
Status: CANCELLED | OUTPATIENT
Start: 2022-09-13

## 2022-07-25 RX ORDER — DIPHENHYDRAMINE HYDROCHLORIDE 50 MG/ML
50 INJECTION INTRAMUSCULAR; INTRAVENOUS
Status: CANCELLED
Start: 2022-08-15

## 2022-07-25 RX ORDER — DIPHENHYDRAMINE HYDROCHLORIDE 50 MG/ML
50 INJECTION INTRAMUSCULAR; INTRAVENOUS
Status: CANCELLED
Start: 2022-07-25

## 2022-07-25 RX ORDER — ALBUTEROL SULFATE 90 UG/1
1-2 AEROSOL, METERED RESPIRATORY (INHALATION)
Status: CANCELLED
Start: 2022-09-13

## 2022-07-25 RX ORDER — DIPHENHYDRAMINE HCL 25 MG
50 CAPSULE ORAL ONCE
Status: COMPLETED | OUTPATIENT
Start: 2022-07-25 | End: 2022-07-25

## 2022-07-25 RX ORDER — MEPERIDINE HYDROCHLORIDE 25 MG/ML
25 INJECTION INTRAMUSCULAR; INTRAVENOUS; SUBCUTANEOUS EVERY 30 MIN PRN
Status: CANCELLED | OUTPATIENT
Start: 2022-09-13

## 2022-07-25 RX ORDER — DIPHENHYDRAMINE HYDROCHLORIDE 50 MG/ML
50 INJECTION INTRAMUSCULAR; INTRAVENOUS
Status: CANCELLED
Start: 2022-09-13

## 2022-07-25 RX ORDER — HEPARIN SODIUM (PORCINE) LOCK FLUSH IV SOLN 100 UNIT/ML 100 UNIT/ML
5 SOLUTION INTRAVENOUS
Status: CANCELLED | OUTPATIENT
Start: 2022-07-25

## 2022-07-25 RX ORDER — HEPARIN SODIUM,PORCINE 10 UNIT/ML
5 VIAL (ML) INTRAVENOUS
Status: CANCELLED | OUTPATIENT
Start: 2022-08-15

## 2022-07-25 RX ORDER — HEPARIN SODIUM,PORCINE 10 UNIT/ML
5 VIAL (ML) INTRAVENOUS
Status: CANCELLED | OUTPATIENT
Start: 2022-09-13

## 2022-07-25 RX ORDER — ALBUTEROL SULFATE 0.83 MG/ML
2.5 SOLUTION RESPIRATORY (INHALATION)
Status: CANCELLED | OUTPATIENT
Start: 2022-08-15

## 2022-07-25 RX ORDER — NALOXONE HYDROCHLORIDE 0.4 MG/ML
0.2 INJECTION, SOLUTION INTRAMUSCULAR; INTRAVENOUS; SUBCUTANEOUS
Status: CANCELLED | OUTPATIENT
Start: 2022-09-13

## 2022-07-25 RX ORDER — EPINEPHRINE 1 MG/ML
0.3 INJECTION, SOLUTION INTRAMUSCULAR; SUBCUTANEOUS EVERY 5 MIN PRN
Status: CANCELLED | OUTPATIENT
Start: 2022-08-15

## 2022-07-25 RX ORDER — MEPERIDINE HYDROCHLORIDE 25 MG/ML
25 INJECTION INTRAMUSCULAR; INTRAVENOUS; SUBCUTANEOUS
Status: CANCELLED
Start: 2022-07-25

## 2022-07-25 RX ORDER — DIPHENHYDRAMINE HCL 25 MG
50 CAPSULE ORAL ONCE
Status: CANCELLED
Start: 2022-09-13

## 2022-07-25 RX ORDER — MEPERIDINE HYDROCHLORIDE 25 MG/ML
25 INJECTION INTRAMUSCULAR; INTRAVENOUS; SUBCUTANEOUS
Status: CANCELLED
Start: 2022-09-13

## 2022-07-25 RX ORDER — DIPHENHYDRAMINE HCL 25 MG
50 CAPSULE ORAL ONCE
Status: CANCELLED
Start: 2022-08-15

## 2022-07-25 RX ORDER — EPINEPHRINE 1 MG/ML
0.3 INJECTION, SOLUTION INTRAMUSCULAR; SUBCUTANEOUS EVERY 5 MIN PRN
Status: CANCELLED | OUTPATIENT
Start: 2022-07-25

## 2022-07-25 RX ORDER — DIPHENHYDRAMINE HCL 25 MG
25 CAPSULE ORAL ONCE
Status: CANCELLED
Start: 2022-09-13

## 2022-07-25 RX ORDER — HEPARIN SODIUM (PORCINE) LOCK FLUSH IV SOLN 100 UNIT/ML 100 UNIT/ML
5 SOLUTION INTRAVENOUS
Status: CANCELLED | OUTPATIENT
Start: 2022-09-13

## 2022-07-25 RX ORDER — NALOXONE HYDROCHLORIDE 0.4 MG/ML
0.2 INJECTION, SOLUTION INTRAMUSCULAR; INTRAVENOUS; SUBCUTANEOUS
Status: CANCELLED | OUTPATIENT
Start: 2022-08-15

## 2022-07-25 RX ORDER — METHYLPREDNISOLONE SODIUM SUCCINATE 125 MG/2ML
125 INJECTION, POWDER, LYOPHILIZED, FOR SOLUTION INTRAMUSCULAR; INTRAVENOUS
Status: CANCELLED
Start: 2022-08-15

## 2022-07-25 RX ORDER — MEPERIDINE HYDROCHLORIDE 25 MG/ML
25 INJECTION INTRAMUSCULAR; INTRAVENOUS; SUBCUTANEOUS EVERY 30 MIN PRN
Status: CANCELLED | OUTPATIENT
Start: 2022-08-15

## 2022-07-25 RX ORDER — ACETAMINOPHEN 325 MG/1
650 TABLET ORAL ONCE
Status: COMPLETED | OUTPATIENT
Start: 2022-07-25 | End: 2022-07-25

## 2022-07-25 RX ORDER — ALBUTEROL SULFATE 90 UG/1
1-2 AEROSOL, METERED RESPIRATORY (INHALATION)
Status: CANCELLED
Start: 2022-07-25

## 2022-07-25 RX ORDER — ACETAMINOPHEN 325 MG/1
650 TABLET ORAL ONCE
Status: CANCELLED
Start: 2022-08-15

## 2022-07-25 RX ORDER — ALBUTEROL SULFATE 0.83 MG/ML
2.5 SOLUTION RESPIRATORY (INHALATION)
Status: CANCELLED | OUTPATIENT
Start: 2022-07-25

## 2022-07-25 RX ORDER — HEPARIN SODIUM (PORCINE) LOCK FLUSH IV SOLN 100 UNIT/ML 100 UNIT/ML
5 SOLUTION INTRAVENOUS
Status: CANCELLED | OUTPATIENT
Start: 2022-08-15

## 2022-07-25 RX ADMIN — ACETAMINOPHEN 650 MG: 325 TABLET ORAL at 13:33

## 2022-07-25 RX ADMIN — RITUXIMAB-ABBS 700 MG: 10 INJECTION, SOLUTION INTRAVENOUS at 13:57

## 2022-07-25 RX ADMIN — DIPHENHYDRAMINE HYDROCHLORIDE 25 MG: 25 CAPSULE ORAL at 13:33

## 2022-07-25 RX ADMIN — SODIUM CHLORIDE 250 ML: 9 INJECTION, SOLUTION INTRAVENOUS at 13:54

## 2022-07-25 ASSESSMENT — PAIN SCALES - GENERAL: PAINLEVEL: NO PAIN (0)

## 2022-07-25 NOTE — LETTER
7/25/2022         RE: Tricia Sosa  [address]  [place] [id number]        Dear Colleague,    Thank you for referring your patient, Tricia Sosa, to the [facility]. Please see a copy of my visit note below.    [facility]    Hematology/Oncology Established Patient Follow-up Note    Treatment Summary:      Today's Date: 7/25/22    Reason for Follow-up: Hemolytic anemia-autoimmune; IgA deficiency     HISTORY OF PRESENT ILLNESS: Tricia Sosa is a 81 year old female who presents with autoimmune hemolytic anemia and IgA deficiency.  She previously saw Dr. Matos and then Dr. Summers.  She was previously seen at [facility].     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  She was very sick at the time and had severe diarrhea.  She lost about 40 pounds in weight.  The workup revealed that she had markedly diminished CD4 counts of 48 and was diagnosed with CMV colitis.  Since then she has been followed in hematology clinic at [place] until recently.  She was noted to have anemia which was fairly long-standing.  She was initially referred for anemia in 2004 and also had a bone marrow biopsy done at that time which revealed hypercellular bone marrow with 70-80% cellularity and normal trilineage hematopoiesis and no morphologic features of MDS or lymphoproliferation.  Her anemia was attributed to her chronic underlying disease.  At the next evaluation in 2002 on 4 aggressive anemia there was no evidence of hemolysis, iron deficiency, B12 or folate deficiency.  Bone marrow biopsy was not pursued.  In summer of 2015 she had progressive fatigue, dyspnea on exertion and worsening anemia with a hemoglobin now of 9.  Workup at this time did suggest a hemolytic anemia with elevated LDH at 709, undetectable haptoglobin and elevated unconjugated bilirubin at 3.3.  Monospecific MARIKA was positive for both IgG and complement.   Cold agglutinin screen was positive with very low titer 1:64.  She was started on prednisone 60 mg daily with supplementation of iron folate and B12 starting 7/31/15.  Her prednisone has been slowly tapered and was discontinued off in January 2016.  She was last followed at [facility] on March 10, 2016 when she had stable hemoglobin.     She was followed by Dr. Matos and Dr. Summers.  Due to relapse of hemolytic anemia, she underwent another course of prednisone that has since been tapered off and rituximab x 4 weekly doses completed in 9/19/19.     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  She started weekly Rituxan on 7/31/20 x 4 doses, completed on 8/21/20.  She tapered off of prednisone in October 2020.     Due to relapse of her AIHA, she started prednisone again on 6/8/21 at 60 mg daily with slow taper and finished taper in August 2021.     Due to relapse of AIHA again, she started prednisone again on 11/2/21 at 70 mg daily with slow taper.  She also completed weekly Rituxan again x 4 doses 12/6/21-12/29/21.    INTERIM HISTORY:  Tricia feels okay.  She last received rituximab on 5/19/22. She continues now on maintenance every 2 months.     Venofer 5/5-5/19.    IVIG was last given on 6/24/22.     No weight loss, fatigue, LAD, night sweats, fever. No gross evidence of bleed.     REVIEW OF SYSTEMS:   A 14 point ROS was reviewed with pertinent positives and negatives in the HPI.       HOME MEDICATIONS:  Current Outpatient Medications   Medication Sig Dispense Refill     cyanocobalamin (VITAMIN  B-12) 1000 MCG tablet   3     Flaxseed, Linseed, (FLAX SEEDS PO) Take by mouth daily       folic acid (FOLVITE) 1 MG tablet   3     hydrochlorothiazide (HYDRODIURIL) 12.5 MG tablet Take 1 tablet (12.5 mg) by mouth daily as needed (edema) 30 tablet 5     ketoconazole (NIZORAL) 2 % external shampoo Use every other day to scalp as needed 120 mL 11     levothyroxine  (SYNTHROID/LEVOTHROID) 150 MCG tablet Take 1 tablet (150 mcg) by mouth daily 90 tablet 3     sulfamethoxazole-trimethoprim (BACTRIM DS) 800-160 MG tablet Take 1 pill orally on Mon, Wed and Friday 45 tablet 3     UNABLE TO FIND privigen inj every two months           ALLERGIES:  Allergies   Allergen Reactions     Doxycycline          PAST MEDICAL HISTORY:  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, and posterior vitreous detachment     Urinary tract infection, site not specified     Recurrent UTI's         PAST SURGICAL HISTORY:  Past Surgical History:   Procedure Laterality Date     COLONOSCOPY N/A 4/26/2016    Procedure: COLONOSCOPY;  Surgeon: Dontae Del Rio MD;  Location:  GI     ENT SURGERY  1985    Thyroid lobectomy     EYE SURGERY Bilateral 04/20/2021    Cataract Surgery      CYSTOSCOPY,INSERT URETERAL STENT      Ureteral stent insertion     HC FLEX SIGMOIDOSCOPY W BIOPSY  5/30/00      LAPAROSCOPY, SURGICAL; CHOLECYSTECTOMY  1992      THYROIDECTOMY       LIGATION OF HEMORRHOID(S)      Hemorrhoidectomy     UPPER GI ENDOSCOPY,BIOPSY  10/01/01     [facility] LIGATE FALLOPIAN TUBE       [facility] COLONOSCOPY W BIOPSY  4/26/00     ZZ COLONOSCOPY W BIOPSY  10/8/03     ZZ COLONOSCOPY W BIOPSY  6/27/05    REPEAT IN 5 YEARS         SOCIAL HISTORY:  Social History     Socioeconomic History     Marital status:      Spouse name: Not on file     Number of  children: Not on file     Years of education: Not on file     Highest education level: Not on file   Occupational History     Not on file   Tobacco Use     Smoking status: Never Smoker     Smokeless tobacco: Never Used   Substance and Sexual Activity     Alcohol use: Yes     Alcohol/week: 0.0 standard drinks     Comment: 1 a day at most     Drug use: No     Sexual activity: Not Currently     Partners: Male   Other Topics Concern     Parent/sibling w/ CABG, MI or angioplasty before 65F 55M? No   Social History Narrative     Not on file     Social Determinants of Health     Financial Resource Strain: Not on file   Food Insecurity: Not on file   Transportation Needs: Not on file   Physical Activity: Not on file   Stress: Not on file   Social Connections: Not on file   Intimate Partner Violence: Not At Risk     Fear of Current or Ex-Partner: No     Emotionally Abused: No     Physically Abused: No     Sexually Abused: No   Housing Stability: Not on file         FAMILY HISTORY:  Family History   Problem Relation Age of Onset     Colon Cancer Mother 90     Cerebrovascular Disease Father          at age 85     Dementia Brother      Prostate Cancer Brother      Thyroid Disease Brother      Dementia Brother      Thyroid Disease Brother      Pancreatic Cancer Brother      Breast Cancer Sister      Hyperlipidemia Sister      Depression Sister      Anxiety Disorder Sister      Thyroid Disease Sister      Breast Cancer Sister      Colon Cancer Niece      Other Cancer Niece         leukemia         PHYSICAL EXAM:  Vital signs:  /68   Pulse 67   Temp 97.6  F (36.4  C) (Tympanic)   Resp 16   Ht 1.829 m (6')   Wt 69.4 kg (152 lb 14.4 oz)   LMP  (LMP Unknown)   SpO2 99%   BMI 20.74 kg/m     ECO  GENERAL/CONSTITUTIONAL: No acute distress.  EYES: No scleral icterus.  NEUROLOGIC: Alert, oriented, answers questions appropriately.  INTEGUMENTARY: No jaundice.    LABS:  CBC RESULTS:   Recent Labs   Lab Test  06/15/22  1131   WBC 5.9   RBC 3.65*   HGB 11.6*   HCT 35.2   MCV 96   MCH 31.8   MCHC 33.0   RDW 17.8*   *       Recent Labs   Lab Test 06/15/22  1131 05/12/22  0925    138   POTASSIUM 3.8 3.8   CHLORIDE 105 109   CO2 29 25   ANIONGAP 5 4   GLC 88 140*   BUN 16 16   CR 0.49* 0.55   CULLEN 8.9 8.1*         IMAGING:  BILATERAL FULL FIELD DIGITAL SCREENING MAMMOGRAM WITH TOMOSYNTHESIS     Performed on: 6/23/22     Compared to: 05/25/2021 and 11/10/2017     Technique:  This study was evaluated with the assistance of Computer-Aided Detection.  Breast Tomosynthesis was used in interpretation.     Findings: The breasts are heterogeneously dense, which may obscure small masses.  There is no radiographic evidence of malignancy.      IMPRESSION: ACR BI-RADS Category 1: Negative     RECOMMENDED FOLLOW-UP: Annual routine screening mammogram      ASSESSMENT/PLAN:  Tricia Sosa is a 81 year old female with:        1. 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 Common variable immunodeficiency disorder  4. Splenomegaly           1) Autoimmune hemolytic anemia: She had relapse in July 2020, and started on prednisone, as well as weekly rituximab infusions x 4, completed on 8/21/20.  Since then, she has been off and on prednisone and rituximab, getting response each time, but relapses are getting more frequent.       She does not want to consider splenectomy yet.      She is completing another 4 doses of weekly rituxumab in May 2022, and then will go on maintenance rituximab to try to maintain her hemoglobin longer.  She wants to try to avoid having to go back on steroids if possible.     -She has tapered off of prednisone again as of early March 2022.    -Hemoglobin improved to 11.8 today.  -Monitor CBC monthly.  -She takes Bactrim for prophylaxis when she is on prednisone - currently off  -Continue maintenance rituximab  every 2 months.     2) CVID:   -Continue IVIG.  She gets it, if IgG is <600.  It's been about every other month.    -IgG check and IVIG every 4 weeks if meets parameters  -She saw immunology in [place] who recommended IVIG monthly, but our financial team continues to check and says that it is only covered with IgG is >600.      3) Iron-deficiency anemia  -s/p Venofer in May 2022.  -Recheck in 2 months.     4) Follow up in 2 months with next date of rituximab. IVIG due in August 2022.        Leandra Ryder DO  Hematology/Oncology  [facility]          Again, thank you for allowing me to participate in the care of your patient.        Sincerely,        Leandra Ryder DO

## 2022-07-25 NOTE — PROGRESS NOTES
Nursing Note:  Tricia Sosa presents today for Labs.    Patient seen by provider today: Yes: Will see Dr. Ryder   present during visit today: Not Applicable.    Note: N/A.    Intravenous Access:  Peripheral IV placed.    Discharge Plan:   Patient was sent to [facility] for 1300 appointment.    Cece Pressley RN

## 2022-07-25 NOTE — PATIENT INSTRUCTIONS
-Labs reviewed. Okay for rituximab today.  -Continue rituximab every 2 months.  -Continue IVIG every 2 months (next due in August 2022).  -Return to clinic for follow up with Dr. Ryder in 2 months with next rituximab treatment.      Labs scheduled 8/15, 9/16, 1010, 11/11, 12/5  IVIG scheduled 8/19, 10/14, 12/9  Rituximab scheduled 9/19, 11/14  Appt with Dr. Ryder scheduled 9/19  Anabell Hawkins, RN, BSN, COLLEEN  RN Care Coordinator  [facility]  Ph: [phone]  F: [phone]

## 2022-07-25 NOTE — NURSING NOTE
Oncology Rooming Note    July 25, 2022 12:57 PM   Tricia Sosa is a 81 year old female who presents for:    Chief Complaint   Patient presents with     Oncology Clinic Visit     Iron deficiency anemia, unspecified iron deficiency anemia type     Initial Vitals: /68   Pulse 67   Temp 97.6  F (36.4  C) (Tympanic)   Resp 16   Ht 1.829 m (6')   Wt 69.4 kg (152 lb 14.4 oz)   LMP  (LMP Unknown)   SpO2 99%   BMI 20.74 kg/m   Estimated body mass index is 20.74 kg/m  as calculated from the following:    Height as of this encounter: 1.829 m (6').    Weight as of this encounter: 69.4 kg (152 lb 14.4 oz). Body surface area is 1.88 meters squared.  No Pain (0) Comment: Data Unavailable   No LMP recorded (lmp unknown). Patient is postmenopausal.  Allergies reviewed: Yes  Medications reviewed: Yes    Medications: Medication refills not needed today.  Pharmacy name entered into [facility]:    [place], [place] - [address] [id number] [place], [place] - [address] [facility]    Clinical concerns: follow up       Montserrat Hutchins, MOHSEN

## 2022-07-25 NOTE — PROGRESS NOTES
Infusion Nursing Note:  Tricia Sosa presents today for Rapid Rituxan.    Patient seen by provider today: Yes: Dr. Ryder.   present during visit today: Not Applicable.    Note: Patient requested 25mg of PO benadryl instead of 50mg.  She did have 25mg with the last infusion and tolerated it well.  Verbal order: Dr. Ryder: okay to change order to 25mg benadryl.    Intravenous Access:  Peripheral IV placed in fast track.    Treatment Conditions:  Not Applicable.    Post Infusion Assessment:  Patient tolerated infusion without incident.  Blood return noted pre and post infusion.  Site patent and intact, free from redness, edema or discomfort.  No evidence of extravasations.  Access discontinued per protocol.     Discharge Plan:   [facility]  Patient will return 8/19/22 for next appointment.   Patient discharged in stable condition accompanied by: self.  Departure Mode: Ambulatory.      Ally Adams RN

## 2022-07-26 LAB
HAPTOGLOB SERPL-MCNC: <3 MG/DL (ref 32–197)
HBV CORE AB SERPL QL IA: NONREACTIVE
IGA SERPL-MCNC: <2 MG/DL (ref 84–499)
IGG SERPL-MCNC: 674 MG/DL (ref 610–1616)
IGM SERPL-MCNC: <10 MG/DL (ref 35–242)

## 2022-07-28 DIAGNOSIS — D59.10 AUTOIMMUNE HEMOLYTIC ANEMIA (H): Primary | ICD-10-CM

## 2022-08-01 ENCOUNTER — HOSPITAL ENCOUNTER (OUTPATIENT)
Dept: PHYSICAL THERAPY | Facility: CLINIC | Age: 82
Setting detail: THERAPIES SERIES
Discharge: HOME | End: 2022-08-01
Attending: INTERNAL MEDICINE
Payer: MEDICARE

## 2022-08-01 ENCOUNTER — HOSPITAL ENCOUNTER (OUTPATIENT)
Dept: OCCUPATIONAL THERAPY | Facility: CLINIC | Age: 82
Setting detail: THERAPIES SERIES
Discharge: HOME | End: 2022-08-01
Attending: INTERNAL MEDICINE
Payer: MEDICARE

## 2022-08-01 PROCEDURE — 97116 GAIT TRAINING THERAPY: CPT | Mod: GP | Performed by: PHYSICAL THERAPIST

## 2022-08-01 PROCEDURE — 97110 THERAPEUTIC EXERCISES: CPT | Mod: GP | Performed by: PHYSICAL THERAPIST

## 2022-08-01 PROCEDURE — 97110 THERAPEUTIC EXERCISES: CPT | Mod: GO

## 2022-08-08 DIAGNOSIS — E03.9 ACQUIRED HYPOTHYROIDISM: ICD-10-CM

## 2022-08-08 DIAGNOSIS — L30.9 DERMATITIS: Primary | ICD-10-CM

## 2022-08-09 ENCOUNTER — HOSPITAL ENCOUNTER (OUTPATIENT)
Dept: PHYSICAL THERAPY | Facility: CLINIC | Age: 82
Setting detail: THERAPIES SERIES
Discharge: HOME | End: 2022-08-09
Attending: INTERNAL MEDICINE
Payer: MEDICARE

## 2022-08-09 ENCOUNTER — HOSPITAL ENCOUNTER (OUTPATIENT)
Dept: OCCUPATIONAL THERAPY | Facility: CLINIC | Age: 82
Setting detail: THERAPIES SERIES
Discharge: HOME | End: 2022-08-09
Attending: INTERNAL MEDICINE
Payer: MEDICARE

## 2022-08-09 PROCEDURE — 97110 THERAPEUTIC EXERCISES: CPT | Mod: GP | Performed by: PHYSICAL THERAPIST

## 2022-08-09 PROCEDURE — 97110 THERAPEUTIC EXERCISES: CPT | Mod: GO

## 2022-08-09 NOTE — PROGRESS NOTES
[facility]    OUTPATIENT PHYSICAL THERAPY  PLAN OF TREATMENT FOR OUTPATIENT REHABILITATION AND PROGRESS NOTE           Patient's Last Name, First Name, Tricia Jc Date of Birth  1940   Provider's Name  [facility] Medical Record No.  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 Plan of Treatment  Frequency/Duration: 1 x per week x 45 days  Certification date from 8/8/2022 to 9/22/2022     Goals:  Goal Identifier HEP   Goal Description Tricia will be independent in a home ex and activity program to address her impairments and functional limitations.   Target Date 08/07/22   Date Met   in progress   Progress (detail required for progress note): 8/9 - in progress.  modifications and progressions continue.  States HEP is challenging.     Goal Identifier Gait Tolerance   Goal Description Tricia will be able to walk 300 feet with her cane to demonstrate increased tolerance for community mobility for attending MD appointments and shopping tasks.   Target Date 08/07/22   Date Met   in progress   Progress (detail required for progress note): 8/9 - not walking very far, gets tired, can't stand upright very long.     Goal Identifier Standing Tolerance   Goal Description Tricia will be able to tolerate standing for 15 minutes in order to better tolerate household tasks and ADLs such as washing dishes, showering/self cares, etc to increase her functional independence.   Target Date 08/07/22   Date Met   in progress   Progress (detail required for progress note): 8/9 - states she can do things longer  than 15 minutes but will have to sit and rest afterward, feels ache in low back.     Goal Identifier Griffin   Goal Description Tricia will be able to score 46/56 or greater on the Griffin to demonstrate low falls risk with daily activities.   Target Date 08/07/22   Date Met   in progress   Progress (detail required for progress note): 8/9 - not able to test today d/t time.  will test at next session.       Beginning/End Dates of Progress Note Reporting Period:  6/8/2022 to 8/9/2022    Progress Toward Goals:   Progress this reporting period: see goals  Progress limited due to low back soreness,  Inconsistent treatment schedule      Client Self (Subjective) Report for Progress Note Reporting Period: Reports feeling some low back soreness/ intermittent sharp pain starting yesterday morning.  The day before that she was doing a lot of weeding in the yard, sitting on a stool and bending fwd.  states she is challenged by HEP activities, chente pulling fwd away from wall and rising on toes.                I CERTIFY THE NEED FOR THESE SERVICES FURNISHED UNDER        THIS PLAN OF TREATMENT AND WHILE UNDER MY CARE     (Physician co-signature of this document indicates review and certification of the therapy plan).                Referring Provider: MD Genny Trevizo, PT

## 2022-08-10 ENCOUNTER — TELEPHONE (OUTPATIENT)
Dept: INTERNAL MEDICINE | Facility: CLINIC | Age: 82
End: 2022-08-10

## 2022-08-10 DIAGNOSIS — M70.20 OLECRANON BURSITIS, UNSPECIFIED LATERALITY: Primary | ICD-10-CM

## 2022-08-10 RX ORDER — LEVOTHYROXINE SODIUM 150 UG/1
TABLET ORAL
Qty: 90 TABLET | Refills: 0 | Status: SHIPPED | OUTPATIENT
Start: 2022-08-10 | End: 2022-11-14

## 2022-08-10 RX ORDER — CLOBETASOL PROPIONATE 0.5 MG/ML
SOLUTION TOPICAL DAILY PRN
Qty: 50 ML | Refills: 0 | Status: SHIPPED | OUTPATIENT
Start: 2022-08-10 | End: 2023-02-01

## 2022-08-10 NOTE — TELEPHONE ENCOUNTER
Call placed to patient regard medication refill request- Clobetasol Solution.    Patient brought up that she has had two persistent minor health issues this summer. Patient states she is happy to wait until Sept. 16 th appointment to discuss with provider. However, since patient was on the phone, she thought she would ask about these items.    Patient has had a persistent runny nose, slight congestion and intermittent cough since June 17 th 2022. Patient states symptoms are not significant enough to affect daily life but has been noticeable. Cough is slightly productive. Phlegm is clear.    Patient also wanted to mention that right elbow bursa has grown since 7/21/22 TE. Last time this happened, bursa decreased. Patient reports slight pain. Patient has been following recommendations from 7/21/22 TE.     Please advise if patient needs appointment or can wait until Sept 16 th 2022.

## 2022-08-10 NOTE — TELEPHONE ENCOUNTER
Call to pt and advised. She doesn't like to take OTC medications. She will think about sending an Evisit. Advised her that she can try Neti pot if prefers more natural options. She agrees.     She would like the referral for Ortho for her Elbow to get scheduled.   OK to send the info to her through her MyChart. Advised to call Ortho, if doesn't hear anything in 2-3 days.

## 2022-08-10 NOTE — TELEPHONE ENCOUNTER
Patient states Clobetasol was prescribed 2020 by Dr. Caitlin Chambers Dermatologist for itchy scalp. Patient has not seen Dermatologist in awhile and is asking for Dr. Church to prescribe medication.    LOV 11/16/2021    FOV 09/16/2022    Clobetasol Priopionate External Solution 0.05%  Routing refill request to provider for review/approval because:  Drug not on the [facility] refill protocol   Drug not active on patient's medication list

## 2022-08-10 NOTE — TELEPHONE ENCOUNTER
Levothyroxine (synthroid/levothroid) 150 mcg tab  Medication is being filled for 1 time refill only due to:  Patient has future appointment.    Appointments in Next Year    Aug 15, 2022 10:45 AM  LAB with MARILEE STRANGE LAB  [facility] ([facility] ) [phone]   Aug 16, 2022 11:00 AM  Neuro Treatment with EULALIA Levin  [facility] ([facility] ) [phone]   Aug 16, 2022 12:00 PM  Neuro Treatment with Genny Daugherty PT  [facility] ([facility] ) [phone]   Aug 16, 2022  2:00 PM  Return Visit with Marley Heart MD  [facility] ([facility] ) [phone]   Aug 19, 2022 11:00 AM  Infusion Visit with RH INFUSION CHAIR 8, RH CANCER INFUSION NURSE  [facility] ([facility] ) [phone]   Aug 23, 2022 11:15 AM  Neuro Treatment with Genny Daugherty PT  [facility] ([facility] ) [phone]   Aug 31, 2022 12:00 PM  Neuro Treatment with Genny Daugherty PT  [facility] ([facility] ) [phone]   Sep 16, 2022 11:00 AM  (Arrive by 10:40 AM)  MEDICARE ANNUAL WELLNESS VISIT with Emily Church MD  [facility] ([facility] ) [phone]   Sep 20, 2022 11:00 AM  Return Visit with Leandra Ryder DO  [facility] ([facility] ) [phone]   Sep 20, 2022 11:30 AM  Infusion Visit with RH INFUSION CHAIR 9, RH CANCER INFUSION NURSE  [facility] ([facility] ) [phone]   Oct 10, 2022 10:45 AM  LAB with  RH MA LAB  [facility] ([facility] ) [phone]   Oct 14, 2022 10:00 AM  Infusion Visit with RH INFUSION CHAIR 3, RH CANCER INFUSION NURSE  [facility] ([facility] ) [phone]   Nov 11, 2022 11:00 AM  LAB with RH MA LAB  [facility] ([facility] ) [phone]   Nov 14, 2022 11:00 AM  Infusion Visit with RH INFUSION CHAIR 1, RH CANCER INFUSION NURSE  [facility] ([facility] ) [phone]   Dec 05, 2022 11:00 AM  LAB with RH MA LAB  [facility] ([facility] ) [phone]   Dec 09, 2022 10:30 AM  Infusion Visit with RH INFUSION CHAIR 12, RH CANCER INFUSION NURSE  [facility] ([facility] ) [phone]

## 2022-08-15 ENCOUNTER — LAB (OUTPATIENT)
Dept: ONCOLOGY | Facility: CLINIC | Age: 82
End: 2022-08-15
Attending: INTERNAL MEDICINE
Payer: MEDICARE

## 2022-08-15 DIAGNOSIS — D59.10 AUTOIMMUNE HEMOLYTIC ANEMIA (H): ICD-10-CM

## 2022-08-15 LAB
ALBUMIN SERPL BCG-MCNC: 4.5 G/DL (ref 3.5–5.2)
ALP SERPL-CCNC: 133 U/L (ref 35–104)
ALT SERPL W P-5'-P-CCNC: 18 U/L (ref 10–35)
ANION GAP SERPL CALCULATED.3IONS-SCNC: 7 MMOL/L (ref 7–15)
AST SERPL W P-5'-P-CCNC: 32 U/L (ref 10–35)
BASOPHILS # BLD AUTO: 0 10E3/UL (ref 0–0.2)
BASOPHILS NFR BLD AUTO: 0 %
BILIRUB SERPL-MCNC: 0.9 MG/DL
BUN SERPL-MCNC: 10.8 MG/DL (ref 8–23)
CALCIUM SERPL-MCNC: 8.8 MG/DL (ref 8.8–10.2)
CHLORIDE SERPL-SCNC: 104 MMOL/L (ref 98–107)
CREAT SERPL-MCNC: 0.57 MG/DL (ref 0.51–0.95)
DEPRECATED HCO3 PLAS-SCNC: 29 MMOL/L (ref 22–29)
EOSINOPHIL # BLD AUTO: 0.1 10E3/UL (ref 0–0.7)
EOSINOPHIL NFR BLD AUTO: 1 %
ERYTHROCYTE [DISTWIDTH] IN BLOOD BY AUTOMATED COUNT: 14.3 % (ref 10–15)
GFR SERPL CREATININE-BSD FRML MDRD: >90 ML/MIN/1.73M2
GLUCOSE SERPL-MCNC: 104 MG/DL (ref 70–99)
HCT VFR BLD AUTO: 37.5 % (ref 35–47)
HGB BLD-MCNC: 12.5 G/DL (ref 11.7–15.7)
IMM GRANULOCYTES # BLD: 0 10E3/UL
IMM GRANULOCYTES NFR BLD: 0 %
LDH SERPL L TO P-CCNC: 257 U/L (ref 0–250)
LYMPHOCYTES # BLD AUTO: 2.1 10E3/UL (ref 0.8–5.3)
LYMPHOCYTES NFR BLD AUTO: 38 %
MCH RBC QN AUTO: 34.5 PG (ref 26.5–33)
MCHC RBC AUTO-ENTMCNC: 33.3 G/DL (ref 31.5–36.5)
MCV RBC AUTO: 104 FL (ref 78–100)
MONOCYTES # BLD AUTO: 0.6 10E3/UL (ref 0–1.3)
MONOCYTES NFR BLD AUTO: 10 %
NEUTROPHILS # BLD AUTO: 2.7 10E3/UL (ref 1.6–8.3)
NEUTROPHILS NFR BLD AUTO: 51 %
NRBC # BLD AUTO: 0 10E3/UL
NRBC BLD AUTO-RTO: 0 /100
PLATELET # BLD AUTO: 146 10E3/UL (ref 150–450)
POTASSIUM SERPL-SCNC: 4.1 MMOL/L (ref 3.4–5.3)
PROT SERPL-MCNC: 6.1 G/DL (ref 6.4–8.3)
RBC # BLD AUTO: 3.62 10E6/UL (ref 3.8–5.2)
RETICS # AUTO: 0.1 10E6/UL (ref 0.03–0.1)
RETICS/RBC NFR AUTO: 2.7 % (ref 0.5–2)
SODIUM SERPL-SCNC: 140 MMOL/L (ref 136–145)
WBC # BLD AUTO: 5.4 10E3/UL (ref 4–11)

## 2022-08-15 PROCEDURE — 83615 LACTATE (LD) (LDH) ENZYME: CPT | Performed by: INTERNAL MEDICINE

## 2022-08-15 PROCEDURE — 80053 COMPREHEN METABOLIC PANEL: CPT | Performed by: INTERNAL MEDICINE

## 2022-08-15 PROCEDURE — 36415 COLL VENOUS BLD VENIPUNCTURE: CPT

## 2022-08-15 PROCEDURE — 83010 ASSAY OF HAPTOGLOBIN QUANT: CPT | Performed by: INTERNAL MEDICINE

## 2022-08-15 PROCEDURE — 85025 COMPLETE CBC W/AUTO DIFF WBC: CPT | Performed by: INTERNAL MEDICINE

## 2022-08-15 PROCEDURE — 85045 AUTOMATED RETICULOCYTE COUNT: CPT | Performed by: INTERNAL MEDICINE

## 2022-08-15 PROCEDURE — 82784 ASSAY IGA/IGD/IGG/IGM EACH: CPT | Performed by: INTERNAL MEDICINE

## 2022-08-16 ENCOUNTER — ONCOLOGY VISIT (OUTPATIENT)
Dept: ONCOLOGY | Facility: CLINIC | Age: 82
End: 2022-08-16
Attending: STUDENT IN AN ORGANIZED HEALTH CARE EDUCATION/TRAINING PROGRAM
Payer: MEDICARE

## 2022-08-16 ENCOUNTER — HOSPITAL ENCOUNTER (OUTPATIENT)
Dept: PHYSICAL THERAPY | Facility: CLINIC | Age: 82
Setting detail: THERAPIES SERIES
Discharge: HOME | End: 2022-08-16
Attending: INTERNAL MEDICINE
Payer: MEDICARE

## 2022-08-16 ENCOUNTER — HOSPITAL ENCOUNTER (OUTPATIENT)
Dept: OCCUPATIONAL THERAPY | Facility: CLINIC | Age: 82
Setting detail: THERAPIES SERIES
Discharge: HOME | End: 2022-08-16
Attending: INTERNAL MEDICINE
Payer: MEDICARE

## 2022-08-16 ENCOUNTER — PRE VISIT (OUTPATIENT)
Dept: ONCOLOGY | Facility: CLINIC | Age: 82
End: 2022-08-16

## 2022-08-16 VITALS
DIASTOLIC BLOOD PRESSURE: 80 MMHG | OXYGEN SATURATION: 100 % | RESPIRATION RATE: 18 BRPM | WEIGHT: 152.4 LBS | TEMPERATURE: 98.3 F | HEART RATE: 71 BPM | BODY MASS INDEX: 20.67 KG/M2 | SYSTOLIC BLOOD PRESSURE: 152 MMHG

## 2022-08-16 DIAGNOSIS — D83.9 CVID (COMMON VARIABLE IMMUNODEFICIENCY) (H): Primary | ICD-10-CM

## 2022-08-16 DIAGNOSIS — M62.81 GENERALIZED MUSCLE WEAKNESS: ICD-10-CM

## 2022-08-16 DIAGNOSIS — D64.9 ANEMIA, UNSPECIFIED TYPE: ICD-10-CM

## 2022-08-16 DIAGNOSIS — R53.83 OTHER FATIGUE: ICD-10-CM

## 2022-08-16 LAB
HAPTOGLOB SERPL-MCNC: <3 MG/DL (ref 32–197)
IGA SERPL-MCNC: <2 MG/DL (ref 84–499)
IGG SERPL-MCNC: 546 MG/DL (ref 610–1616)
IGM SERPL-MCNC: <10 MG/DL (ref 35–242)

## 2022-08-16 PROCEDURE — G0463 HOSPITAL OUTPT CLINIC VISIT: HCPCS

## 2022-08-16 PROCEDURE — 97535 SELF CARE MNGMENT TRAINING: CPT | Mod: GO

## 2022-08-16 PROCEDURE — 99215 OFFICE O/P EST HI 40 MIN: CPT | Mod: GC | Performed by: STUDENT IN AN ORGANIZED HEALTH CARE EDUCATION/TRAINING PROGRAM

## 2022-08-16 PROCEDURE — 97110 THERAPEUTIC EXERCISES: CPT | Mod: GP | Performed by: PHYSICAL THERAPIST

## 2022-08-16 PROCEDURE — 97110 THERAPEUTIC EXERCISES: CPT | Mod: GO

## 2022-08-16 ASSESSMENT — PAIN SCALES - GENERAL: PAINLEVEL: NO PAIN (0)

## 2022-08-16 NOTE — LETTER
8/16/2022         RE: Tricia Sosa  [id number]  [place] [place] [address]        Dear Colleague,    Thank you for referring your patient, Tricia Sosa, to the [facility]. Please see a copy of my visit note below.    Oncology Rooming Note    August 16, 2022 1:34 PM   Tricia Sosa is a 81 year old female who presents for:    Chief Complaint   Patient presents with     Oncology Clinic Visit     Initial Vitals: LMP  (LMP Unknown)  Estimated body mass index is 20.74 kg/m  as calculated from the following:    Height as of 7/25/22: 1.829 m (6').    Weight as of 7/25/22: 69.4 kg (152 lb 14.4 oz). There is no height or weight on file to calculate BSA.  Data Unavailable Comment: Data Unavailable   No LMP recorded (lmp unknown). Patient is postmenopausal.  Allergies reviewed: Yes  Medications reviewed: Yes    Medications: Medication refills not needed today.  Pharmacy name entered into [facility]:    [place], [place] - [id number] [place] [address] PHARMACY [address] [place] [address] [address] [place] [address]    Clinical concerns: Pt wondering if a back brace would be helpful? Dr. Heart was notified.      Shari J. Schoenberger, [facility]   PM&R Clinic Note        Interval history:     Tricia Sosa presents to clinic today for follow up regarding her rehab needs.     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  She was very sick at the time and had severe diarrhea.  She lost about 40 pounds in weight.  The workup revealed that she had markedly diminished CD4 counts of 48 and was diagnosed with CMV colitis.  Since then she has been followed in hematology clinic at [place] until recently.  She was noted to have anemia which was fairly long-standing.  She was initially referred for anemia in 2004 and also had a bone marrow biopsy done at that time which revealed hypercellular bone  marrow with 70-80% cellularity and normal trilineage hematopoiesis and no morphologic features of MDS or lymphoproliferation.  Her anemia was attributed to her chronic underlying disease.  At the next evaluation in 2002 on 4 aggressive anemia there was no evidence of hemolysis, iron deficiency, B12 or folate deficiency.  Bone marrow biopsy was not pursued.  In summer of 2015 she had progressive fatigue, dyspnea on exertion and worsening anemia with a hemoglobin now of 9.  Workup at this time did suggest a hemolytic anemia with elevated LDH at 709, undetectable haptoglobin and elevated unconjugated bilirubin at 3.3.  Monospecific MARIKA was positive for both IgG and complement.  Cold agglutinin screen was positive with very low titer 1:64.  She was started on prednisone 60 mg daily with supplementation of iron folate and B12 starting 7/31/15.  Her prednisone has been slowly tapered and was discontinued off in January 2016.  She was last followed at [facility] on March 10, 2016 when she had stable hemoglobin.     She was followed by Dr. Matos and Dr. Summers.  Due to relapse of hemolytic anemia, she underwent another course of prednisone that has since been tapered off and rituximab x 4 weekly doses completed in 9/19/19.     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  She started weekly Rituxan on 7/31/20 x 4 doses, completed on 8/21/20.  She tapered off of prednisone in October 2020.     Due to relapse of her AIHA, she started prednisone again on 6/8/21 at 60 mg daily with slow taper and finished taper in August 2021.     Due to relapse of AIHA again, she started prednisone again on 11/2/21 at 70 mg daily with slow taper, completed early March 2022.  She also completed weekly Rituxuan again x 4 doses 12/6/21-12/29/21.     At her last visit they discussed splenectomy due to concern about poor response to steroids, but patient wanting to wait and consider  additional rituximab when needed. She last received rituximab on 22 and continues now on maintenance every 2 months. Venofer completed -, IVIG was last given on 22.     Was last seen in rehab clinic  On 05/10/2022 with recommendations includin. Initiating a referral for physical therapy and occupational therapy for help with balance and strength.  2. Quad cane ordered.    Medical issues since last visit,  No medical issues since her last visit. She continues to follow with Heme/Onc for treatment. She was has recent discontinued with chronic steroid use.     Symptoms  Since last visit, Tricia reports frustration since her strength and stability have not improved. She denies worsening strength but continues to reports trunk instability, worse when performing overhead activities. She has completed 6 sessions of PT and 6 sessions  of OT and reports performing her home exercise program intermittently.  She is wondering if a back brace would assist her during her periods of weakness.    She is ambulating independently throughout the house but with the assistance of a quad cane for long periods.  She denies any recent falls, last fall in 2022 but reports her cane is clumsy.    Discussed weight, the patient is recently down 10 pounds and reports poor nutrition.  She previously tried to receive a nutrition consult but this was denied by Medicare.    Therapies/HEP,  -Occupational Therapy:  Completed 6 sessions of occupational therapy, last session was today and she was discharged.     -Physical therapy:   She has completed 6 rounds of physical therapy, continues to follow with PT to work on core strength exercises.  She has a home exercise program in place.  We discussed possibility of pool therapy, the patient will not attend sessions with other people given her immune deficiency and potential of COVID exposure    Functionally,  She modified independent with mobility. She reports that performing  activities of daily living has been challenging.     Social History            Tobacco Use     Smoking status: Never Smoker     Smokeless tobacco: Never Used   Substance Use Topics     Alcohol use: Yes       Alcohol/week: 0.0 standard drinks       Comment: 1 a day at most      Marital Status:     Living situation: she lives with son and daughter in law.   Family support: available   Vocational History: office work     Medications:  Current Outpatient Medications   Medication Sig Dispense Refill     clobetasol (TEMOVATE) 0.05 % external solution Apply topically daily as needed (itch) 50 mL 0     cyanocobalamin (VITAMIN  B-12) 1000 MCG tablet   3     Flaxseed, Linseed, (FLAX SEEDS PO) Take by mouth daily       folic acid (FOLVITE) 1 MG tablet   3     hydrochlorothiazide (HYDRODIURIL) 12.5 MG tablet Take 1 tablet (12.5 mg) by mouth daily as needed (edema) 30 tablet 5     ketoconazole (NIZORAL) 2 % external shampoo Use every other day to scalp as needed 120 mL 11     levothyroxine (SYNTHROID/LEVOTHROID) 150 MCG tablet Take 1 tablet  by mouth daily 90 tablet 0     UNABLE TO FIND privigen inj every two months       sulfamethoxazole-trimethoprim (BACTRIM DS) 800-160 MG tablet Take 1 pill orally on Mon, Wed and Friday 45 tablet 3          Physical Exam:   Gen: NAD, pleasant and cooperative   HEENT: Atraumatic, normocephalic, extraocular movements appear intact  Pulm: non-labored breathing in room air  Neuro/MSK:   Orientation: Oriented to person, time, place and situation, Exhibits good insight into her condition and ongoing treatment  Motor: Observed moving upper extremities actively against gravity.  Gait: Unstable gait with lateral lean to the right    Labs/Imaging:  Lab Results   Component Value Date    WBC 5.4 08/15/2022    HGB 12.5 08/15/2022    HCT 37.5 08/15/2022     (H) 08/15/2022     (L) 08/15/2022     Lab Results   Component Value Date     08/15/2022    POTASSIUM 4.1 08/15/2022     CHLORIDE 104 08/15/2022    CO2 29 08/15/2022     (H) 08/15/2022     Lab Results   Component Value Date    GFRESTIMATED >90 08/15/2022    GFRESTBLACK >90 06/29/2021     Lab Results   Component Value Date    AST 32 08/15/2022    ALT 18 08/15/2022    ALKPHOS 133 (H) 08/15/2022    BILITOTAL 0.9 08/15/2022    BILIDIRECT 0.30 05/19/2000     No results found for: INR  Lab Results   Component Value Date    BUN 10.8 08/15/2022    CR 0.57 08/15/2022              Assessment/Plan     Tricia Sosa is a 81 year old female with with autoimmune hemolytic anemia and IgA deficiency.  She presents to the clinic for evaluation of her rehabilitation needs.     Patient continues to presents to PM&R rehab clinic with generalized/proximal muscle weakness that started around the time of chronic steroid use.  However she continues to have periods of autoimmune hemolytic anemia requiring steroid use.  Her therapy is complicated by being homebound in the context of her immune deficiency.  She has completed goals with OT and continues to follow with physical therapy.    1. Patient education: In depth discussion and education was provided about the assessment and implications of each of the below recommendations for management. Patient indicated readiness to learn, all questions were answered and understanding of material presented was confirmed.  2. Work-up: none at this time. If her balance and weakness is getting worse or not improving with therapies, may consider EMG to rule differential diagnosis.  3. Therapy/equipment/braces:     Continue using quad cane for long distance ambulation    Order placed for TLSO to be used for long periods of ambulation only and not to be used for daily chronic use, discussed with patient.  4. Referral / follow up with other providers:    1. Nutrition consult placed  2. Continue follow up with PCP and hematology/oncology as indicated.   5. Follow up: 3 months       Jatinder Chavis M.D.  Physical  Medicine & Rehabilitation PGY-4    Physician Attestation   I, Marley Heart MD, saw this patient and agree with the findings and plan of care as documented in the note.      Items personally reviewed/procedural attestation: vitals, labs and imaging and agree with the interpretation documented in the note.    Marley Heart MD  Physical Medicine & Rehabilitation    50 minutes spent on the date of the encounter doing chart review, history and exam, documentation and further activities as noted above.      Again, thank you for allowing me to participate in the care of your patient.        Sincerely,        Marley Heart MD

## 2022-08-16 NOTE — PATIENT INSTRUCTIONS
1.  You were seen in PM&R rehab clinic today for evaluation of your strength and gait steadiness  2.  Continue with physical therapy to work on core strengthening and perform home exercise program.  3.  Order was placed for a back brace today.  This is to only be used for increased exercising and long walks.  Not for daily use.

## 2022-08-16 NOTE — PROGRESS NOTES
Oncology Rooming Note    August 16, 2022 1:34 PM   Tricia Sosa is a 81 year old female who presents for:    Chief Complaint   Patient presents with     Oncology Clinic Visit     Initial Vitals: LMP  (LMP Unknown)  Estimated body mass index is 20.74 kg/m  as calculated from the following:    Height as of 7/25/22: 1.829 m (6').    Weight as of 7/25/22: 69.4 kg (152 lb 14.4 oz). There is no height or weight on file to calculate BSA.  Data Unavailable Comment: Data Unavailable   No LMP recorded (lmp unknown). Patient is postmenopausal.  Allergies reviewed: Yes  Medications reviewed: Yes    Medications: Medication refills not needed today.  Pharmacy name entered into [facility]:    [facility] [place], [place] - [address] [facility] [id number] [place], [place] - [address] [facility]    Clinical concerns: Pt wondering if a back brace would be helpful? Dr. Heart was notified.      Shari J. Schoenberger, [facility]

## 2022-08-16 NOTE — PROGRESS NOTES
[facility]   PM&R Clinic Note        Interval history:     Tricia Sosa presents to clinic today for follow up regarding her rehab needs.     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  She was very sick at the time and had severe diarrhea.  She lost about 40 pounds in weight.  The workup revealed that she had markedly diminished CD4 counts of 48 and was diagnosed with CMV colitis.  Since then she has been followed in hematology clinic at [facility] until recently.  She was noted to have anemia which was fairly long-standing.  She was initially referred for anemia in 2004 and also had a bone marrow biopsy done at that time which revealed hypercellular bone marrow with 70-80% cellularity and normal trilineage hematopoiesis and no morphologic features of MDS or lymphoproliferation.  Her anemia was attributed to her chronic underlying disease.  At the next evaluation in 2002 on 4 aggressive anemia there was no evidence of hemolysis, iron deficiency, B12 or folate deficiency.  Bone marrow biopsy was not pursued.  In summer of 2015 she had progressive fatigue, dyspnea on exertion and worsening anemia with a hemoglobin now of 9.  Workup at this time did suggest a hemolytic anemia with elevated LDH at 709, undetectable haptoglobin and elevated unconjugated bilirubin at 3.3.  Monospecific MARIKA was positive for both IgG and complement.  Cold agglutinin screen was positive with very low titer 1:64.  She was started on prednisone 60 mg daily with supplementation of iron folate and B12 starting 7/31/15.  Her prednisone has been slowly tapered and was discontinued off in January 2016.  She was last followed at [facility] on March 10, 2016 when she had stable hemoglobin.     She was followed by Dr. Matos and Dr. Summers.  Due to relapse of hemolytic anemia, she underwent another course of prednisone that has since been tapered off and rituximab x 4 weekly doses  completed in 19.     Due to relapse of her autoimmune hemolytic anemia, she started another course of prednisone in 2020.  She started weekly Rituxan on 7/31/20 x 4 doses, completed on 20.  She tapered off of prednisone in 2020.     Due to relapse of her AIHA, she started prednisone again on 21 at 60 mg daily with slow taper and finished taper in 2021.     Due to relapse of AIHA again, she started prednisone again on 21 at 70 mg daily with slow taper, completed early 2022.  She also completed weekly Rituxuan again x 4 doses 21-21.     At her last visit they discussed splenectomy due to concern about poor response to steroids, but patient wanting to wait and consider additional rituximab when needed. She last received rituximab on 22 and continues now on maintenance every 2 months. Venofer completed -, IVIG was last given on 22.     Was last seen in rehab clinic  On 05/10/2022 with recommendations includin. Initiating a referral for physical therapy and occupational therapy for help with balance and strength.  2. Quad cane ordered.    Medical issues since last visit,  No medical issues since her last visit. She continues to follow with Heme/Onc for treatment. She was has recent discontinued with chronic steroid use.     Symptoms  Since last visit, Tricia reports frustration since her strength and stability have not improved. She denies worsening strength but continues to reports trunk instability, worse when performing overhead activities. She has completed 6 sessions of PT and 6 sessions  of OT and reports performing her home exercise program intermittently.  She is wondering if a back brace would assist her during her periods of weakness.    She is ambulating independently throughout the house but with the assistance of a quad cane for long periods.  She denies any recent falls, last fall in 2022 but reports her cane is  clumsy.    Discussed weight, the patient is recently down 10 pounds and reports poor nutrition.  She previously tried to receive a nutrition consult but this was denied by Medicare.    Therapies/HEP,  -Occupational Therapy:  Completed 6 sessions of occupational therapy, last session was today and she was discharged.     -Physical therapy:   She has completed 6 rounds of physical therapy, continues to follow with PT to work on core strength exercises.  She has a home exercise program in place.  We discussed possibility of pool therapy, the patient will not attend sessions with other people given her immune deficiency and potential of COVID exposure    Functionally,  She modified independent with mobility. She reports that performing activities of daily living has been challenging.     Social History            Tobacco Use     Smoking status: Never Smoker     Smokeless tobacco: Never Used   Substance Use Topics     Alcohol use: Yes       Alcohol/week: 0.0 standard drinks       Comment: 1 a day at most      Marital Status:     Living situation: she lives with son and daughter in law.   Family support: available   Vocational History: office work     Medications:  Current Outpatient Medications   Medication Sig Dispense Refill     clobetasol (TEMOVATE) 0.05 % external solution Apply topically daily as needed (itch) 50 mL 0     cyanocobalamin (VITAMIN  B-12) 1000 MCG tablet   3     Flaxseed, Linseed, (FLAX SEEDS PO) Take by mouth daily       folic acid (FOLVITE) 1 MG tablet   3     hydrochlorothiazide (HYDRODIURIL) 12.5 MG tablet Take 1 tablet (12.5 mg) by mouth daily as needed (edema) 30 tablet 5     ketoconazole (NIZORAL) 2 % external shampoo Use every other day to scalp as needed 120 mL 11     levothyroxine (SYNTHROID/LEVOTHROID) 150 MCG tablet Take 1 tablet  by mouth daily 90 tablet 0     UNABLE TO FIND privigen inj every two months       sulfamethoxazole-trimethoprim (BACTRIM DS) 800-160 MG tablet Take 1 pill  orally on Mon, Wed and Friday 45 tablet 3          Physical Exam:   Gen: NAD, pleasant and cooperative   HEENT: Atraumatic, normocephalic, extraocular movements appear intact  Pulm: non-labored breathing in room air  Neuro/MSK:   Orientation: Oriented to person, time, place and situation, Exhibits good insight into her condition and ongoing treatment  Motor: Observed moving upper extremities actively against gravity.  Gait: Unstable gait with lateral lean to the right    Labs/Imaging:  Lab Results   Component Value Date    WBC 5.4 08/15/2022    HGB 12.5 08/15/2022    HCT 37.5 08/15/2022     (H) 08/15/2022     (L) 08/15/2022     Lab Results   Component Value Date     08/15/2022    POTASSIUM 4.1 08/15/2022    CHLORIDE 104 08/15/2022    CO2 29 08/15/2022     (H) 08/15/2022     Lab Results   Component Value Date    GFRESTIMATED >90 08/15/2022    GFRESTBLACK >90 06/29/2021     Lab Results   Component Value Date    AST 32 08/15/2022    ALT 18 08/15/2022    ALKPHOS 133 (H) 08/15/2022    BILITOTAL 0.9 08/15/2022    BILIDIRECT 0.30 05/19/2000     No results found for: INR  Lab Results   Component Value Date    BUN 10.8 08/15/2022    CR 0.57 08/15/2022              Assessment/Plan     Tricia Sosa is a 81 year old female with with autoimmune hemolytic anemia and IgA deficiency.  She presents to the clinic for evaluation of her rehabilitation needs.     Patient continues to presents to PM&R rehab clinic with generalized/proximal muscle weakness that started around the time of chronic steroid use.  However she continues to have periods of autoimmune hemolytic anemia requiring steroid use.  Her therapy is complicated by being homebound in the context of her immune deficiency.  She has completed goals with OT and continues to follow with physical therapy.    1. Patient education: In depth discussion and education was provided about the assessment and implications of each of the below recommendations  for management. Patient indicated readiness to learn, all questions were answered and understanding of material presented was confirmed.  2. Work-up: none at this time. If her balance and weakness is getting worse or not improving with therapies, may consider EMG to rule differential diagnosis.  3. Therapy/equipment/braces:     Continue using quad cane for long distance ambulation    Order placed for TLSO to be used for long periods of ambulation only and not to be used for daily chronic use, discussed with patient.  4. Referral / follow up with other providers:    1. Nutrition consult placed  2. Continue follow up with PCP and hematology/oncology as indicated.   5. Follow up: 3 months       Jatinder Chavis M.D.  Physical Medicine & Rehabilitation PGY-4    Physician Attestation   I, Marley Heart MD, saw this patient and agree with the findings and plan of care as documented in the note.      Items personally reviewed/procedural attestation: vitals, labs and imaging and agree with the interpretation documented in the note.    Marley Heart MD  Physical Medicine & Rehabilitation    50 minutes spent on the date of the encounter doing chart review, history and exam, documentation and further activities as noted above.

## 2022-08-16 NOTE — PROGRESS NOTES
[facility]    Outpatient Occupational Therapy Discharge Note  Patient: Tricia Sosa  : 1940    Beginning/End Dates of Reporting Period:  22 to 22 (with most recent reporting period being 22 - 22)    Referring Provider: Marley Heart MD     Therapy Diagnosis: Decreased safety and IND with ADLs/IADLs    Client Self Report: Overall pt reports she feels she has a lot of great resources from OT. Has no significant concerns remaining following today's session and reports agreement with today's discharge.    Objective Measurements:     Objective Measure: /pinch strength   Details: R hand: 44#  strength (was 41#), 9# lateral pinch (was 6#), and 10# palmar pinch (was 9#). L hand: 45#  strength (was 35#), 8# lateral pinch (was 6#), and 9# palmar pinch (was 7#).     Objective Measure: FACIT   Details: The FACIT-F (Functional Assessment of Chronic Illness Therapy - Fatigue) is a 13-item questionnaire that assesses self-reported fatigue and its impact upon daily activities and function.  The range of possible scores is 0-52, with 0 being the worst possible score and 52 the best.  Average score in [place] general population is 40.  Any effort to raise or maintain a score above 30 would help keep the person WNLs as defined by + or - one SD.  If a person were to have a 3-4 point increase or decrease in score, one can reasonably classify that person as having changed. Pt scored 40/52 today (WFLs and significantly improved from 20/52 on 6/15/22 - see flowsheet for details).        Goals:     Goal Identifier Hand Strengthening   Goal Description Pt will demonstrate improved bilateral strength by 3# pinch strength (lateral and palmar) for improved ADL/IADL completion. (e.g. grasping utensils, opening containers, holding/carrying objects).   Target Date 22   Date Met       Progress (detail required for progress note):  Goal progressing but not fully met - see objective measures above. Overall, pt demonstrating consistent adherence to theraputty HEP and demonstrates positive improvement in all areas of hand strength tested. Pt to continue with HEP beyond discharge.     Goal Identifier UE Strengthening   Goal Description Pt will demonstrate independent completion of 3-5 activity HEP for UE strength/endurance to promote independence with ADLs/IADLs (e.g. retrieving object from cabinet or refrigerator, microwaving food, hanging laundry, putting away dishes).   Target Date 08/31/22   Date Met  08/16/22   Progress (detail required for progress note):  Goal met. Pt educated in UE hand strengthening HEP with blue foam block then red theraputty and rubber band, as well as arm exercises with resistance (hand weight vs resistance band). Additionally trained in cervical and trunk ROM exercises to further promote activity tolerance for ADLs/IADLs.     Goal Identifier Fatigue Management/Sleep Hygiene   Goal Description Pt will demonstrate 3 energy conservation strategies (e.g. pacing, planning, prioritizing, sleep hygiene, etc.) to facilitate fatigue management with ADL/IADL tasks (e.g. laundry, meal preparation, walking/exercise).   Target Date 08/31/22   Date Met  08/16/22   Progress (detail required for progress note):  Goal met - see improvement on FACIT noted above. Pt educated in basic fatigue management principles including planning, prioritizing, pacing, and posture strategies to promote IND with ADLs/IADLs. Overall, pt feeling like her back pain/discomfort may be a largest contributor to reduced level of activity but notes feeling improved energy and ability to completed her daily activities (though the number of activities is more limited than a couple years ago per the pt). Encouraged to continue with HEP and seeing PT as appropriate to further progress activity tolerance, particularly  with tasks requiring balance/standing.     Goal Identifier Adaptive Equipment/Strategies   Goal Description Patient to verbalize understanding of and demonstrate use of 3 new pieces of adaptive equipment and/or adapted techniques to increase independence and safety with ADL/IADLs (dressing, bathing, fall prevention, etc.)   Target Date 08/31/22   Date Met  08/16/22   Progress (detail required for progress note):  Goal met. Educated in adaptive strategies for completing vacuuming, sweeping, and opening of dog food containers, as well as strategies to effectively complete meal preparation, serving, cleaning, and organization of kitchen/food items. Trained in cervical and trunk ROM exercises to relieve muscle tension and promote mobility for completing ADLs/IADLs with ease and efficiency. Issued handout for carryover to home and encouraged continued use of strategies in home environment.       Plan:  Discharge from therapy. Pt was seen for a total of 6 outpatient OT sessions focused on the above goal areas (see progress noted). Overall pt demonstrating significant improvement in strength and activity tolerance and reports feeling she has the tools to be successful at home beyond discharge.     Discharge:    Reason for Discharge: Patient has met all goals.  Patient chooses to discontinue therapy.    Equipment Issued: blue foam block, red theraputty, yellow resistance band    Discharge Plan: Patient to continue with home program and is welcome to return to outpatient OT in the future, as needed, with new referral from her physician.    Thank you for the referral of this patient.  If you have any questions regarding the information in this report, please feel free to contact me per the information provided below.      Jocelyn Chavis MA, OTR/L  Occupational Therapist  [id number] [place] [address] Fax:  [phone]  Clinic Phone: [phone]

## 2022-08-19 ENCOUNTER — INFUSION THERAPY VISIT (OUTPATIENT)
Dept: INFUSION THERAPY | Facility: CLINIC | Age: 82
End: 2022-08-19
Attending: INTERNAL MEDICINE
Payer: MEDICARE

## 2022-08-19 VITALS
WEIGHT: 153 LBS | DIASTOLIC BLOOD PRESSURE: 59 MMHG | HEART RATE: 70 BPM | SYSTOLIC BLOOD PRESSURE: 115 MMHG | BODY MASS INDEX: 20.75 KG/M2 | TEMPERATURE: 98.5 F | OXYGEN SATURATION: 98 %

## 2022-08-19 DIAGNOSIS — D59.10 AUTOIMMUNE HEMOLYTIC ANEMIA (H): ICD-10-CM

## 2022-08-19 DIAGNOSIS — D83.9 CVID (COMMON VARIABLE IMMUNODEFICIENCY) (H): ICD-10-CM

## 2022-08-19 DIAGNOSIS — D80.3 SELECTIVE DEFICIENCY OF IGG SUBCLASSES (H): Primary | ICD-10-CM

## 2022-08-19 DIAGNOSIS — D59.19 OTHER AUTOIMMUNE HEMOLYTIC ANEMIA (H): ICD-10-CM

## 2022-08-19 PROCEDURE — 250N000011 HC RX IP 250 OP 636: Performed by: INTERNAL MEDICINE

## 2022-08-19 PROCEDURE — 96375 TX/PRO/DX INJ NEW DRUG ADDON: CPT

## 2022-08-19 PROCEDURE — 96366 THER/PROPH/DIAG IV INF ADDON: CPT

## 2022-08-19 PROCEDURE — 96365 THER/PROPH/DIAG IV INF INIT: CPT

## 2022-08-19 RX ORDER — NALOXONE HYDROCHLORIDE 0.4 MG/ML
0.2 INJECTION, SOLUTION INTRAMUSCULAR; INTRAVENOUS; SUBCUTANEOUS
Status: CANCELLED | OUTPATIENT
Start: 2022-08-24

## 2022-08-19 RX ORDER — ALBUTEROL SULFATE 0.83 MG/ML
2.5 SOLUTION RESPIRATORY (INHALATION)
Status: CANCELLED | OUTPATIENT
Start: 2022-08-24

## 2022-08-19 RX ORDER — HEPARIN SODIUM (PORCINE) LOCK FLUSH IV SOLN 100 UNIT/ML 100 UNIT/ML
5 SOLUTION INTRAVENOUS
Status: CANCELLED | OUTPATIENT
Start: 2022-08-24

## 2022-08-19 RX ORDER — MEPERIDINE HYDROCHLORIDE 25 MG/ML
25 INJECTION INTRAMUSCULAR; INTRAVENOUS; SUBCUTANEOUS EVERY 30 MIN PRN
Status: CANCELLED | OUTPATIENT
Start: 2022-08-24

## 2022-08-19 RX ORDER — DIPHENHYDRAMINE HCL 25 MG
50 CAPSULE ORAL ONCE
Status: CANCELLED
Start: 2022-08-24

## 2022-08-19 RX ORDER — HEPARIN SODIUM,PORCINE 10 UNIT/ML
5 VIAL (ML) INTRAVENOUS
Status: CANCELLED | OUTPATIENT
Start: 2022-08-24

## 2022-08-19 RX ORDER — EPINEPHRINE 1 MG/ML
0.3 INJECTION, SOLUTION INTRAMUSCULAR; SUBCUTANEOUS EVERY 5 MIN PRN
Status: CANCELLED | OUTPATIENT
Start: 2022-08-24

## 2022-08-19 RX ORDER — DIPHENHYDRAMINE HYDROCHLORIDE 50 MG/ML
50 INJECTION INTRAMUSCULAR; INTRAVENOUS
Status: CANCELLED
Start: 2022-08-24

## 2022-08-19 RX ORDER — ACETAMINOPHEN 325 MG/1
650 TABLET ORAL ONCE
Status: CANCELLED
Start: 2022-08-24

## 2022-08-19 RX ORDER — METHYLPREDNISOLONE SODIUM SUCCINATE 125 MG/2ML
125 INJECTION, POWDER, LYOPHILIZED, FOR SOLUTION INTRAMUSCULAR; INTRAVENOUS
Status: CANCELLED
Start: 2022-08-24

## 2022-08-19 RX ORDER — ALBUTEROL SULFATE 90 UG/1
1-2 AEROSOL, METERED RESPIRATORY (INHALATION)
Status: CANCELLED
Start: 2022-08-24

## 2022-08-19 RX ADMIN — HYDROCORTISONE SODIUM SUCCINATE 100 MG: 100 INJECTION, POWDER, FOR SOLUTION INTRAMUSCULAR; INTRAVENOUS at 11:22

## 2022-08-19 RX ADMIN — HUMAN IMMUNOGLOBULIN G 40 G: 40 LIQUID INTRAVENOUS at 11:30

## 2022-08-19 NOTE — PROGRESS NOTES
Infusion Nursing Note:  Tricia Sosa presents today for IVIG.    Patient seen by provider today: No   present during visit today: Not Applicable.    Note:   --Pre-med: 100mg IV solucortef  --IVIG rate started at 0.5ml/kg/hr for 15mins, increased by 0.5ml/kg/hr every 15mins as tolerated to a max rate of 4ml/kh/hr.    Intravenous Access:  Peripheral IV placed.    Treatment Conditions:    IgG on 8/15 was 546. Treat if below 600.     Biological Infusion Checklist:  ~~~ NOTE: If the patient answers yes to any of the questions below, hold the infusion and contact ordering provider or on-call provider.    1. Have you recently had an elevated temperature, fever, chills, productive cough, coughing for 3 weeks or longer or hemoptysis, abnormal vital signs, night sweats,  chest pain or have you noticed a decrease in your appetite, unexplained weight loss or fatigue? No  2. Do you have any open wounds or new incisions? No  3. Do you have any recent or upcoming hospitalizations, surgeries or dental procedures? No  4. Do you currently have or recently have had any signs of illness or infection or are you on any antibiotics? No  5. Have you had any new, sudden or worsening abdominal pain? No  6. Have you or anyone in your household received a live vaccination in the past 4 weeks? Please note:  No live vaccines while on biologic/chemotherapy until 6 months after the last treatment.  Patient can receive the flu vaccine (shot only) and the pneumovax.  It is optimal for the patient to get these vaccines mid cycle, but they can be given at any time as long as it is not on the day of the infusion. No  7. Have you recently been diagnosed with any new nervous system diseases (ie. Multiple sclerosis, Guillain Knox, seizures, neurological changes) or cancer diagnosis? No  8. Are you on any form of radiation or chemotherapy? No  9. Are you pregnant or breast feeding or do you have plans of pregnancy in the future? No  10. Have  you been having any signs of worsening depression or suicidal ideations?  (benlysta only) No  11. Have there been any other new onset medical symptoms? No      Post Infusion Assessment:  Patient tolerated infusion without incident.  Blood return noted pre and post infusion.  Site patent and intact, free from redness, edema or discomfort.  No evidence of extravasations.  Access discontinued per protocol.  Biologic Infusion Post Education: Call the triage nurse at your clinic or seek medical attention if you have chills and/or temperature greater than or equal to 100.5, uncontrolled nausea/vomiting, diarrhea, constipation, dizziness, shortness of breath, chest pain, heart palpitations, weakness or any other new or concerning symptoms, questions or concerns.  You cannot have any live virus vaccines prior to or during treatment or up to 6 months post infusion.  If you have an upcoming surgery, medical procedure or dental procedure during treatment, this should be discussed with your ordering physician and your surgeon/dentist.  If you are having any concerning symptom, if you are unsure if you should get your next infusion or wish to speak to a provider before your next infusion, please call your care coordinator or triage nurse at your clinic to notify them so we can adequately serve you.     Discharge Plan:   AVS to patient via [employer].  Patient will return 9/20 for next appointment.   Patient discharged in stable condition accompanied by: self.  Departure Mode: Ambulatory.      Sharan Schaefer RN

## 2022-08-20 ENCOUNTER — HEALTH MAINTENANCE LETTER (OUTPATIENT)
Age: 82
End: 2022-08-20

## 2022-08-23 ENCOUNTER — HOSPITAL ENCOUNTER (OUTPATIENT)
Dept: PHYSICAL THERAPY | Facility: CLINIC | Age: 82
Setting detail: THERAPIES SERIES
Discharge: HOME | End: 2022-08-23
Attending: INTERNAL MEDICINE
Payer: MEDICARE

## 2022-08-23 PROCEDURE — 97110 THERAPEUTIC EXERCISES: CPT | Mod: GP | Performed by: PHYSICAL THERAPIST

## 2022-08-31 ENCOUNTER — HOSPITAL ENCOUNTER (OUTPATIENT)
Dept: PHYSICAL THERAPY | Facility: CLINIC | Age: 82
Setting detail: THERAPIES SERIES
Discharge: HOME | End: 2022-08-31
Attending: INTERNAL MEDICINE
Payer: MEDICARE

## 2022-08-31 PROCEDURE — 97110 THERAPEUTIC EXERCISES: CPT | Mod: GP | Performed by: PHYSICAL THERAPIST

## 2022-09-03 ASSESSMENT — KOOS JR
STANDING UPRIGHT: MILD
KOOS JR SCORING: 76.33
GOING UP OR DOWN STAIRS: MILD
RISING FROM SITTING: MILD
TWISING OR PIVOTING ON KNEE: MILD

## 2022-09-06 ENCOUNTER — ANCILLARY PROCEDURE (OUTPATIENT)
Dept: GENERAL RADIOLOGY | Facility: CLINIC | Age: 82
End: 2022-09-06
Attending: ORTHOPAEDIC SURGERY
Payer: MEDICARE

## 2022-09-06 ENCOUNTER — OFFICE VISIT (OUTPATIENT)
Dept: ORTHOPEDICS | Facility: CLINIC | Age: 82
End: 2022-09-06

## 2022-09-06 VITALS — DIASTOLIC BLOOD PRESSURE: 74 MMHG | SYSTOLIC BLOOD PRESSURE: 126 MMHG

## 2022-09-06 DIAGNOSIS — M83.2 ADULT OSTEOMALACIA DUE TO MALABSORPTION: ICD-10-CM

## 2022-09-06 DIAGNOSIS — M25.561 RIGHT KNEE PAIN, UNSPECIFIED CHRONICITY: Primary | ICD-10-CM

## 2022-09-06 DIAGNOSIS — M25.561 RIGHT KNEE PAIN, UNSPECIFIED CHRONICITY: ICD-10-CM

## 2022-09-06 DIAGNOSIS — M17.11 PATELLOFEMORAL ARTHRITIS OF RIGHT KNEE: ICD-10-CM

## 2022-09-06 PROCEDURE — 99203 OFFICE O/P NEW LOW 30 MIN: CPT | Mod: 25 | Performed by: ORTHOPAEDIC SURGERY

## 2022-09-06 PROCEDURE — 73562 X-RAY EXAM OF KNEE 3: CPT | Mod: TC | Performed by: RADIOLOGY

## 2022-09-06 PROCEDURE — 20610 DRAIN/INJ JOINT/BURSA W/O US: CPT | Mod: RT | Performed by: ORTHOPAEDIC SURGERY

## 2022-09-06 RX ORDER — TRIAMCINOLONE ACETONIDE 40 MG/ML
40 INJECTION, SUSPENSION INTRA-ARTICULAR; INTRAMUSCULAR
Status: DISCONTINUED | OUTPATIENT
Start: 2022-09-06 | End: 2022-11-01

## 2022-09-06 RX ADMIN — TRIAMCINOLONE ACETONIDE 40 MG: 40 INJECTION, SUSPENSION INTRA-ARTICULAR; INTRAMUSCULAR at 15:08

## 2022-09-06 NOTE — LETTER
9/6/2022         RE: Tricia Sosa  [id number]  [facility] [address]        Dear Colleague,    Thank you for referring your patient, Tricia Sosa, to the [facility] [place]. Please see a copy of my visit note below.    S: Patient is a 81 year old female seen today in consultation for right knee instability.  They report onset of symptoms ~4 weeks ago.  She reports a couple weeks ago she was sitting on a stool weeding in the garden. She went to stand up, and nearly fell, but was able to catch herself with there cane. She notes reoccurring sensation of buckling in the knee. She reports pain in the posterior knee, and behind the knee cap.  Patient uses 4 prong walker, and notes high fall risk and that she is very cautious.     Patient also reports right elbow bursitis and is improving.   She also notes right hip pain, and in the groin. She reports this pain is intermittent, and most prominent with moving in and out of the car, and positioning her legs.     No current treatment.     Patient lives with her son and daughter-in-law.          Patient Active Problem List   Diagnosis     Lymphocytic colitis     Acquired hypothyroidism     CVID (common variable immunodeficiency) (H)     B12 deficiency     CARDIOVASCULAR SCREENING; LDL GOAL LESS THAN 130     Other autoimmune hemolytic anemia (H)     Right-sided low back pain with right-sided sciatica, unspecified chronicity     Autoimmune hemolytic anemia (H)     Ulcerative colitis (H)     Bronchiectasis (H)     Mild obstructive sleep apnea     Selective deficiency of IgG subclasses (H)     Anemia, iron deficiency            Past Medical History:   Diagnosis Date     WARD (dyspnea on exertion)      External hemorrhoids without mention of complication 6/27/05     Hemolytic anemia (H)     autoimmune     Hypothyroidism      IgA deficiency (H)      Myopia of both eyes with astigmatism and presbyopia 8/16/2017     Other malaise and  fatigue      Other severe protein-calorie malnutrition      Other vitreous opacities 2006     Thyroid disease      Traumatic intracranial subdural hematoma (H) 2014    Hemorrhage Subdural Trauma Without LOC Active     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, and posterior vitreous detachment     Urinary tract infection, site not specified     Recurrent UTI's            Past Surgical History:   Procedure Laterality Date     COLONOSCOPY N/A 2016    Procedure: COLONOSCOPY;  Surgeon: Dontae Del Rio MD;  Location:  GI     ENT SURGERY      Thyroid lobectomy     EYE SURGERY Bilateral 2021    Cataract Surgery      CYSTOSCOPY,INSERT URETERAL STENT      Ureteral stent insertion     HC FLEX SIGMOIDOSCOPY W BIOPSY  00      LAPAROSCOPY, SURGICAL; CHOLECYSTECTOMY        THYROIDECTOMY       LIGATION OF HEMORRHOID(S)      Hemorrhoidectomy     UPPER GI ENDOSCOPY,BIOPSY  10/01/01     ZZ LIGATE FALLOPIAN TUBE       ZZ COLONOSCOPY W BIOPSY  00     ZZHC COLONOSCOPY W BIOPSY  10/8/03     ZZ COLONOSCOPY W BIOPSY  05    REPEAT IN 5 YEARS            Social History     Tobacco Use     Smoking status: Never Smoker     Smokeless tobacco: Never Used   Substance Use Topics     Alcohol use: Yes     Alcohol/week: 0.0 standard drinks     Comment: 1 a day at most            Family History   Problem Relation Age of Onset     Colon Cancer Mother 90     Cerebrovascular Disease Father          at age 85     Dementia Brother      Prostate Cancer Brother      Thyroid Disease Brother      Dementia Brother      Thyroid Disease Brother      Pancreatic Cancer Brother      Breast Cancer Sister      Hyperlipidemia Sister      Depression Sister      Anxiety Disorder Sister      Thyroid Disease Sister      Breast Cancer Sister      Colon Cancer Niece      Other Cancer Niece         leukemia               Allergies   Allergen Reactions     Doxycycline              Current Outpatient Medications   Medication Sig Dispense Refill     clobetasol (TEMOVATE) 0.05 % external solution Apply topically daily as needed (itch) 50 mL 0     cyanocobalamin (VITAMIN  B-12) 1000 MCG tablet   3     Flaxseed, Linseed, (FLAX SEEDS PO) Take by mouth daily       folic acid (FOLVITE) 1 MG tablet   3     hydrochlorothiazide (HYDRODIURIL) 12.5 MG tablet Take 1 tablet (12.5 mg) by mouth daily as needed (edema) 30 tablet 5     ketoconazole (NIZORAL) 2 % external shampoo Use every other day to scalp as needed 120 mL 11     levothyroxine (SYNTHROID/LEVOTHROID) 150 MCG tablet Take 1 tablet  by mouth daily 90 tablet 0     sulfamethoxazole-trimethoprim (BACTRIM DS) 800-160 MG tablet Take 1 pill orally on Mon, Wed and Friday 45 tablet 3     UNABLE TO FIND privigen inj every two months            Review Of Systems  Skin: negative  Eyes: negative  Ears/Nose/Throat: negative  Respiratory: No shortness of breath, dyspnea on exertion, cough, or hemoptysis    O: Physical Exam:  Patellofemoral crepitus, + Blanca, parapatellar tenderness to palpation. CMS intact to both lower extremities.  Slight effusion R knee.  Adequate flexion and no instability.    Lab: need to update inflammatory markers/arthritic profile/25 OH Vit D    Images:                                                                    IMPRESSION:  1.  Right knee degenerative arthrosis. Advanced degenerative changes  in the patellofemoral compartment, with complete joint space  narrowing, subchondral sclerosis, and marginal osteophytosis. Mild  degenerative changes in the medial and lateral compartments.  2.  No fracture or joint malalignment.  3.  No significant joint effusion         A:  Endstage patellofemoral OA R knee    P: obtain labs  Inject R knee joint and see if this helps with feeling of instability.  Verbal and written consent/ethyl chloride/chloroprep  Follow outcomes   notify if exacerbation symptoms  See back 4-6 weeks           In  addition to the above assessment and plan each active problem on Maandrew's problem list was evaluated today. This included the questioning of Tricia for any medication problems. We will continue the current treatment plan for these active problems except as noted.    Large Joint Injection/Arthocentesis: R knee joint    Date/Time: 9/6/2022 3:08 PM  Performed by: Denia Gao MD  Authorized by: Denia Gao MD     Indications:  Pain  Needle Size:  25 G  Guidance: landmark guided    Approach:  Superolateral  Location:  Knee      Medications:  40 mg triamcinolone 40 MG/ML  Medications comment:  3 ml Lidocaine 1%  NDC: 3077-3598-15  LOT: [id number]  Exp: 9/1/23    3 ml Bupivicaine .25%  NDC: 9595-4446-37  LOT: [id number]  Exp: 9/30/23              Again, thank you for allowing me to participate in the care of your patient.        Sincerely,        DENIA GAO MD

## 2022-09-06 NOTE — PATIENT INSTRUCTIONS
Thank you for choosing [facility]    Dr. Castillo Locations:    [facility] - [address], [address]  [place], [place] [id number] [place] [address]   Appointments: [phone] Appointments: [phone]   Fax: [phone] Fax: [phone]       Follow up: 1 month.    Please call [phone] to schedule your follow up appointment.     Lab orders have been placed evaluating for Vitamin D level,  inflammatory and arthritic markers. You can go to your [facility] location that has a lab. Please call your [facility] to schedule a lab appointment. [facility] has a walk-in hospital lab for your convenience.     Steroid injection of the right knee was performed today in clinic    - Would not soak in a hot tub, bath or swimming pool for 48 hours  - Ok to shower  - Ice today and only do your normal amounts of activity  - The lidocaine (what is giving you pain relief right now) will likely stop working in 1-2 hours.  You will then have pain again, similar to before you received the injection. The corticosteroid will not start working until approximately 1-2 weeks from now.  In a small percentage of people, cortisone can cause flushing/redness in the face. This usually lasts for 1-3 days and resolves. Cool compress and Ibuprofen/Tylenol can help if this happens.        For any questions please contact my office, [phone].

## 2022-09-06 NOTE — PROGRESS NOTES
S: Patient is a 81 year old female seen today in consultation for right knee instability.  They report onset of symptoms ~4 weeks ago.  She reports a couple weeks ago she was sitting on a stool weeding in the garden. She went to stand up, and nearly fell, but was able to catch herself with there cane. She notes reoccurring sensation of buckling in the knee. She reports pain in the posterior knee, and behind the knee cap.  Patient uses 4 prong walker, and notes high fall risk and that she is very cautious.     Patient also reports right elbow bursitis and is improving.   She also notes right hip pain, and in the groin. She reports this pain is intermittent, and most prominent with moving in and out of the car, and positioning her legs.     No current treatment.     Patient lives with her son and daughter-in-law.          Patient Active Problem List   Diagnosis     Lymphocytic colitis     Acquired hypothyroidism     CVID (common variable immunodeficiency) (H)     B12 deficiency     CARDIOVASCULAR SCREENING; LDL GOAL LESS THAN 130     Other autoimmune hemolytic anemia (H)     Right-sided low back pain with right-sided sciatica, unspecified chronicity     Autoimmune hemolytic anemia (H)     Ulcerative colitis (H)     Bronchiectasis (H)     Mild obstructive sleep apnea     Selective deficiency of IgG subclasses (H)     Anemia, iron deficiency            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    Hemorrhage Subdural Trauma Without LOC Active     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, and posterior vitreous detachment     Urinary tract infection, site not specified     Recurrent UTI's            Past Surgical History:   Procedure Laterality Date     COLONOSCOPY N/A 2016    Procedure: COLONOSCOPY;  Surgeon: Dontae Del Rio MD;  Location:  GI     ENT SURGERY      Thyroid lobectomy     EYE SURGERY Bilateral 2021    Cataract Surgery     HC CYSTOSCOPY,INSERT URETERAL STENT      Ureteral stent insertion     HC FLEX SIGMOIDOSCOPY W BIOPSY  00     HC LAPAROSCOPY, SURGICAL; CHOLECYSTECTOMY        THYROIDECTOMY       LIGATION OF HEMORRHOID(S)      Hemorrhoidectomy     UPPER GI ENDOSCOPY,BIOPSY  10/01/01     ZZC LIGATE FALLOPIAN TUBE       ZZ COLONOSCOPY W BIOPSY  00     ZZHC COLONOSCOPY W BIOPSY  10/8/03     ZZHC COLONOSCOPY W BIOPSY  05    REPEAT IN 5 YEARS            Social History     Tobacco Use     Smoking status: Never Smoker     Smokeless tobacco: Never Used   Substance Use Topics     Alcohol use: Yes     Alcohol/week: 0.0 standard drinks     Comment: 1 a day at most            Family History   Problem Relation Age of Onset     Colon Cancer Mother 90     Cerebrovascular Disease Father          at age 85     Dementia Brother      Prostate Cancer Brother      Thyroid Disease Brother      Dementia Brother      Thyroid Disease Brother      Pancreatic Cancer Brother      Breast Cancer Sister      Hyperlipidemia Sister      Depression Sister      Anxiety Disorder Sister      Thyroid Disease Sister      Breast Cancer Sister      Colon Cancer Niece      Other Cancer Niece         leukemia               Allergies   Allergen Reactions     Doxycycline             Current Outpatient Medications   Medication Sig Dispense Refill     clobetasol (TEMOVATE) 0.05 % external solution Apply topically daily as needed (itch) 50 mL 0     cyanocobalamin (VITAMIN  B-12) 1000 MCG tablet   3     Flaxseed, Linseed, (FLAX SEEDS PO) Take by mouth  daily       folic acid (FOLVITE) 1 MG tablet   3     hydrochlorothiazide (HYDRODIURIL) 12.5 MG tablet Take 1 tablet (12.5 mg) by mouth daily as needed (edema) 30 tablet 5     ketoconazole (NIZORAL) 2 % external shampoo Use every other day to scalp as needed 120 mL 11     levothyroxine (SYNTHROID/LEVOTHROID) 150 MCG tablet Take 1 tablet  by mouth daily 90 tablet 0     sulfamethoxazole-trimethoprim (BACTRIM DS) 800-160 MG tablet Take 1 pill orally on Mon, Wed and Friday 45 tablet 3     UNABLE TO FIND privigen inj every two months            Review Of Systems  Skin: negative  Eyes: negative  Ears/Nose/Throat: negative  Respiratory: No shortness of breath, dyspnea on exertion, cough, or hemoptysis    O: Physical Exam:  Patellofemoral crepitus, + Blanca, parapatellar tenderness to palpation. CMS intact to both lower extremities.  Slight effusion R knee.  Adequate flexion and no instability.    Lab: need to update inflammatory markers/arthritic profile/25 OH Vit D    Images:                                                                    IMPRESSION:  1.  Right knee degenerative arthrosis. Advanced degenerative changes  in the patellofemoral compartment, with complete joint space  narrowing, subchondral sclerosis, and marginal osteophytosis. Mild  degenerative changes in the medial and lateral compartments.  2.  No fracture or joint malalignment.  3.  No significant joint effusion         A:  Endstage patellofemoral OA R knee    P: obtain labs  Inject R knee joint and see if this helps with feeling of instability.  Verbal and written consent/ethyl chloride/chloroprep  Follow outcomes   notify if exacerbation symptoms  See back 4-6 weeks           In addition to the above assessment and plan each active problem on Tricia's problem list was evaluated today. This included the questioning of Tricia for any medication problems. We will continue the current treatment plan for these active problems except as noted.    Large  Joint Injection/Arthocentesis: R knee joint    Date/Time: 9/6/2022 3:08 PM  Performed by: Gurjit Castillo MD  Authorized by: Gurjit Castillo MD     Indications:  Pain  Needle Size:  25 G  Guidance: landmark guided    Approach:  Superolateral  Location:  Knee      Medications:  40 mg triamcinolone 40 MG/ML  Medications comment:  3 ml Lidocaine 1%  NDC: 3844-8606-89  LOT: [id number]  Exp: 9/1/23    3 ml Bupivicaine .25%  NDC: 2992-0774-68  LOT: [id number]  Exp: 9/30/23

## 2022-09-08 ENCOUNTER — MYC MEDICAL ADVICE (OUTPATIENT)
Dept: ORTHOPEDICS | Facility: CLINIC | Age: 82
End: 2022-09-08

## 2022-09-09 ASSESSMENT — ACTIVITIES OF DAILY LIVING (ADL)
CURRENT_FUNCTION: HOUSEWORK REQUIRES ASSISTANCE
CURRENT_FUNCTION: SHOPPING REQUIRES ASSISTANCE
CURRENT_FUNCTION: TRANSPORTATION REQUIRES ASSISTANCE
CURRENT_FUNCTION: PREPARING MEALS REQUIRES ASSISTANCE

## 2022-09-09 ASSESSMENT — ENCOUNTER SYMPTOMS
BREAST MASS: 0
DYSURIA: 0
SORE THROAT: 0
HEMATURIA: 0
HEADACHES: 0
CHILLS: 0
EYE PAIN: 0
DIARRHEA: 0
SHORTNESS OF BREATH: 0
MYALGIAS: 1
HEARTBURN: 0
HEMATOCHEZIA: 0
PALPITATIONS: 0
DIZZINESS: 0
NERVOUS/ANXIOUS: 0
FEVER: 0
WEAKNESS: 1
JOINT SWELLING: 0
PARESTHESIAS: 0
CONSTIPATION: 0
ARTHRALGIAS: 0
COUGH: 0
ABDOMINAL PAIN: 0
NAUSEA: 0
FREQUENCY: 0

## 2022-09-09 NOTE — TELEPHONE ENCOUNTER
Per Dr. Castillo :  Simple hinged knee brace (short runner or equivalent ) /hinged knee sleeve thanks

## 2022-09-09 NOTE — TELEPHONE ENCOUNTER
Please see [email or web address] message with update.   It is sounding like all the same issues discussed at her office visit on 9/6/22. Do you advise any type of knee brace or further recommendations at this time, or just to follow up in 1 month?    NIK Christiansen RN

## 2022-09-16 ENCOUNTER — APPOINTMENT (OUTPATIENT)
Dept: INTERNAL MEDICINE | Facility: CLINIC | Age: 82
End: 2022-09-16
Payer: MEDICARE

## 2022-09-16 ENCOUNTER — LAB (OUTPATIENT)
Dept: LAB | Facility: CLINIC | Age: 82
End: 2022-09-16

## 2022-09-16 DIAGNOSIS — D59.10 AUTOIMMUNE HEMOLYTIC ANEMIA (H): ICD-10-CM

## 2022-09-16 DIAGNOSIS — M25.561 RIGHT KNEE PAIN, UNSPECIFIED CHRONICITY: ICD-10-CM

## 2022-09-16 DIAGNOSIS — M83.2 ADULT OSTEOMALACIA DUE TO MALABSORPTION: ICD-10-CM

## 2022-09-16 DIAGNOSIS — M17.11 PATELLOFEMORAL ARTHRITIS OF RIGHT KNEE: ICD-10-CM

## 2022-09-16 DIAGNOSIS — D50.9 IRON DEFICIENCY ANEMIA, UNSPECIFIED IRON DEFICIENCY ANEMIA TYPE: ICD-10-CM

## 2022-09-16 LAB
BASOPHILS # BLD AUTO: 0 10E3/UL (ref 0–0.2)
BASOPHILS NFR BLD AUTO: 0 %
CRP SERPL-MCNC: <3 MG/L
EOSINOPHIL # BLD AUTO: 0 10E3/UL (ref 0–0.7)
EOSINOPHIL NFR BLD AUTO: 0 %
ERYTHROCYTE [DISTWIDTH] IN BLOOD BY AUTOMATED COUNT: 14.4 % (ref 10–15)
ERYTHROCYTE [SEDIMENTATION RATE] IN BLOOD BY WESTERGREN METHOD: 40 MM/HR (ref 0–30)
HCT VFR BLD AUTO: 36.9 % (ref 35–47)
HGB BLD-MCNC: 12.9 G/DL (ref 11.7–15.7)
IMM GRANULOCYTES # BLD: 0 10E3/UL
IMM GRANULOCYTES NFR BLD: 0 %
IRON SATN MFR SERPL: 42 % (ref 15–46)
IRON SERPL-MCNC: 132 UG/DL (ref 35–180)
LDH SERPL L TO P-CCNC: 231 U/L (ref 81–234)
LYMPHOCYTES # BLD AUTO: 1.4 10E3/UL (ref 0.8–5.3)
LYMPHOCYTES NFR BLD AUTO: 31 %
MCH RBC QN AUTO: 35 PG (ref 26.5–33)
MCHC RBC AUTO-ENTMCNC: 35 G/DL (ref 31.5–36.5)
MCV RBC AUTO: 100 FL (ref 78–100)
MONOCYTES # BLD AUTO: 0.5 10E3/UL (ref 0–1.3)
MONOCYTES NFR BLD AUTO: 11 %
NEUTROPHILS # BLD AUTO: 2.7 10E3/UL (ref 1.6–8.3)
NEUTROPHILS NFR BLD AUTO: 58 %
PLATELET # BLD AUTO: 135 10E3/UL (ref 150–450)
RBC # BLD AUTO: 3.69 10E6/UL (ref 3.8–5.2)
RETICS # AUTO: 0.09 10E6/UL (ref 0.03–0.1)
RETICS/RBC NFR AUTO: 2.6 % (ref 0.5–2)
TIBC SERPL-MCNC: 318 UG/DL (ref 240–430)
WBC # BLD AUTO: 4.6 10E3/UL (ref 4–11)

## 2022-09-16 PROCEDURE — 82784 ASSAY IGA/IGD/IGG/IGM EACH: CPT

## 2022-09-16 PROCEDURE — 82306 VITAMIN D 25 HYDROXY: CPT

## 2022-09-16 PROCEDURE — 83010 ASSAY OF HAPTOGLOBIN QUANT: CPT

## 2022-09-16 PROCEDURE — 84550 ASSAY OF BLOOD/URIC ACID: CPT

## 2022-09-16 PROCEDURE — 85652 RBC SED RATE AUTOMATED: CPT

## 2022-09-16 PROCEDURE — 85045 AUTOMATED RETICULOCYTE COUNT: CPT

## 2022-09-16 PROCEDURE — 85025 COMPLETE CBC W/AUTO DIFF WBC: CPT

## 2022-09-16 PROCEDURE — 86225 DNA ANTIBODY NATIVE: CPT

## 2022-09-16 PROCEDURE — 80053 COMPREHEN METABOLIC PANEL: CPT

## 2022-09-16 PROCEDURE — 86038 ANTINUCLEAR ANTIBODIES: CPT

## 2022-09-16 PROCEDURE — 86140 C-REACTIVE PROTEIN: CPT

## 2022-09-16 PROCEDURE — 86431 RHEUMATOID FACTOR QUANT: CPT

## 2022-09-16 PROCEDURE — 85379 FIBRIN DEGRADATION QUANT: CPT

## 2022-09-16 PROCEDURE — 83615 LACTATE (LD) (LDH) ENZYME: CPT

## 2022-09-16 PROCEDURE — 86200 CCP ANTIBODY: CPT

## 2022-09-16 PROCEDURE — 83550 IRON BINDING TEST: CPT

## 2022-09-16 PROCEDURE — 82728 ASSAY OF FERRITIN: CPT

## 2022-09-16 PROCEDURE — 36415 COLL VENOUS BLD VENIPUNCTURE: CPT

## 2022-09-17 LAB
ALBUMIN SERPL-MCNC: 4.2 G/DL (ref 3.4–5)
ALP SERPL-CCNC: 114 U/L (ref 40–150)
ALT SERPL W P-5'-P-CCNC: 26 U/L (ref 0–50)
ANION GAP SERPL CALCULATED.3IONS-SCNC: 7 MMOL/L (ref 3–14)
AST SERPL W P-5'-P-CCNC: 33 U/L (ref 0–45)
BILIRUB SERPL-MCNC: 1.1 MG/DL (ref 0.2–1.3)
BUN SERPL-MCNC: 13 MG/DL (ref 7–30)
CALCIUM SERPL-MCNC: 8.8 MG/DL (ref 8.5–10.1)
CHLORIDE BLD-SCNC: 106 MMOL/L (ref 94–109)
CO2 SERPL-SCNC: 25 MMOL/L (ref 20–32)
CREAT SERPL-MCNC: 0.65 MG/DL (ref 0.52–1.04)
D DIMER PPP FEU-MCNC: <0.27 UG/ML FEU (ref 0–0.5)
FERRITIN SERPL-MCNC: 30 NG/ML (ref 8–252)
GFR SERPL CREATININE-BSD FRML MDRD: 88 ML/MIN/1.73M2
GLUCOSE BLD-MCNC: 95 MG/DL (ref 70–99)
POTASSIUM BLD-SCNC: 3.7 MMOL/L (ref 3.4–5.3)
PROT SERPL-MCNC: 6.6 G/DL (ref 6.8–8.8)
SODIUM SERPL-SCNC: 138 MMOL/L (ref 133–144)
URATE SERPL-MCNC: 3.7 MG/DL (ref 2.6–6)

## 2022-09-19 LAB
ANA SER QL IF: NEGATIVE
DEPRECATED CALCIDIOL+CALCIFEROL SERPL-MC: 15 UG/L (ref 20–75)
HAPTOGLOB SERPL-MCNC: <3 MG/DL (ref 32–197)
IGA SERPL-MCNC: <2 MG/DL (ref 84–499)
IGG SERPL-MCNC: 711 MG/DL (ref 610–1616)
IGM SERPL-MCNC: 10 MG/DL (ref 35–242)
RHEUMATOID FACT SER NEPH-ACNC: <6 IU/ML

## 2022-09-19 NOTE — PROGRESS NOTES
[facility]    Hematology/Oncology Established Patient Follow-up Note    Treatment Summary:      Today's Date: 9/20/22    Reason for Follow-up: Hemolytic anemia-autoimmune; IgA deficiency     HISTORY OF PRESENT ILLNESS: Tricia Sosa is a 81 year old female who presents with autoimmune hemolytic anemia and IgA deficiency.  She previously saw Dr. Matos and then Dr. Summers.  She was previously seen at [facility].     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  She was very sick at the time and had severe diarrhea.  She lost about 40 pounds in weight.  The workup revealed that she had markedly diminished CD4 counts of 48 and was diagnosed with CMV colitis.  Since then she has been followed in hematology clinic at [place] until recently.  She was noted to have anemia which was fairly long-standing.  She was initially referred for anemia in 2004 and also had a bone marrow biopsy done at that time which revealed hypercellular bone marrow with 70-80% cellularity and normal trilineage hematopoiesis and no morphologic features of MDS or lymphoproliferation.  Her anemia was attributed to her chronic underlying disease.  At the next evaluation in 2002 on 4 aggressive anemia there was no evidence of hemolysis, iron deficiency, B12 or folate deficiency.  Bone marrow biopsy was not pursued.  In summer of 2015 she had progressive fatigue, dyspnea on exertion and worsening anemia with a hemoglobin now of 9.  Workup at this time did suggest a hemolytic anemia with elevated LDH at 709, undetectable haptoglobin and elevated unconjugated bilirubin at 3.3.  Monospecific MARIKA was positive for both IgG and complement.  Cold agglutinin screen was positive with very low titer 1:64.  She was started on prednisone 60 mg daily with supplementation of iron folate and B12 starting 7/31/15.  Her prednisone has been slowly tapered and was discontinued off in January 2016.  She was last  followed at [facility] on March 10, 2016 when she had stable hemoglobin.     She was followed by Dr. Matos and Dr. Summers.  Due to relapse of hemolytic anemia, she underwent another course of prednisone that has since been tapered off and rituximab x 4 weekly doses completed in 9/19/19.     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  She started weekly Rituxan on 7/31/20 x 4 doses, completed on 8/21/20.  She tapered off of prednisone in October 2020.     Due to relapse of her AIHA, she started prednisone again on 6/8/21 at 60 mg daily with slow taper and finished taper in August 2021.     Due to relapse of AIHA again, she started prednisone again on 11/2/21 at 70 mg daily with slow taper.  She also completed weekly Rituxan again x 4 doses 12/6/21-12/29/21.      INTERIM HISTORY:  Venofer 5/5-5/19.    Patient continues to do well.  She last received rituximab on 7/25/22. She continues now on maintenance every 2 months.     IVIG was last given on 8/19/22.     No weight loss, fatigue, LAD, night sweats, fever. No gross evidence of bleed.     REVIEW OF SYSTEMS:   A 14 point ROS was reviewed with pertinent positives and negatives in the HPI.       HOME MEDICATIONS:  Current Outpatient Medications   Medication Sig Dispense Refill     clobetasol (TEMOVATE) 0.05 % external solution Apply topically daily as needed (itch) 50 mL 0     cyanocobalamin (VITAMIN  B-12) 1000 MCG tablet   3     Flaxseed, Linseed, (FLAX SEEDS PO) Take by mouth daily       folic acid (FOLVITE) 1 MG tablet   3     hydrochlorothiazide (HYDRODIURIL) 12.5 MG tablet Take 1 tablet (12.5 mg) by mouth daily as needed (edema) 30 tablet 5     ketoconazole (NIZORAL) 2 % external shampoo Use every other day to scalp as needed 120 mL 11     levothyroxine (SYNTHROID/LEVOTHROID) 150 MCG tablet Take 1 tablet  by mouth daily 90 tablet 0     UNABLE TO FIND privigen inj every two months            ALLERGIES:  Allergies   Allergen Reactions     Doxycycline          PAST MEDICAL HISTORY:  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, and posterior vitreous detachment     Urinary tract infection, site not specified     Recurrent UTI's         PAST SURGICAL HISTORY:  Past Surgical History:   Procedure Laterality Date     COLONOSCOPY N/A 4/26/2016    Procedure: COLONOSCOPY;  Surgeon: Dontae Del Rio MD;  Location:  GI     ENT SURGERY  1985    Thyroid lobectomy     EYE SURGERY Bilateral 04/20/2021    Cataract Surgery     HC CYSTOSCOPY,INSERT URETERAL STENT      Ureteral stent insertion     HC FLEX SIGMOIDOSCOPY W BIOPSY  5/30/00      LAPAROSCOPY, SURGICAL; CHOLECYSTECTOMY  1992      THYROIDECTOMY       LIGATION OF HEMORRHOID(S)      Hemorrhoidectomy     UPPER GI ENDOSCOPY,BIOPSY  10/01/01     ZZC LIGATE FALLOPIAN TUBE       ZZHC COLONOSCOPY W BIOPSY  4/26/00     ZZHC COLONOSCOPY W BIOPSY  10/8/03     ZZHC COLONOSCOPY W BIOPSY  6/27/05    REPEAT IN 5 YEARS         SOCIAL HISTORY:  Social History     Socioeconomic History     Marital status:      Spouse name: Not on file     Number of children: Not on file     Years of education: Not on file     Highest education level: Not on file   Occupational History     Not on file   Tobacco Use     Smoking status: Never Smoker     Smokeless tobacco: Never Used   Substance and Sexual Activity      Alcohol use: Yes     Alcohol/week: 0.0 standard drinks     Comment: 1 a day at most     Drug use: No     Sexual activity: Not Currently     Partners: Male   Other Topics Concern     Parent/sibling w/ CABG, MI or angioplasty before 65F 55M? No   Social History Narrative     Not on file     Social Determinants of Health     Financial Resource Strain: Not on file   Food Insecurity: Not on file   Transportation Needs: Not on file   Physical Activity: Not on file   Stress: Not on file   Social Connections: Not on file   Intimate Partner Violence: Not At Risk     Fear of Current or Ex-Partner: No     Emotionally Abused: No     Physically Abused: No     Sexually Abused: No   Housing Stability: Not on file         FAMILY HISTORY:  Family History   Problem Relation Age of Onset     Colon Cancer Mother 90     Cerebrovascular Disease Father          at age 85     Dementia Brother      Prostate Cancer Brother      Thyroid Disease Brother      Dementia Brother      Thyroid Disease Brother      Pancreatic Cancer Brother      Breast Cancer Sister      Hyperlipidemia Sister      Depression Sister      Anxiety Disorder Sister      Thyroid Disease Sister      Breast Cancer Sister      Colon Cancer Niece      Other Cancer Niece         leukemia         PHYSICAL EXAM:  Vital signs:  /64   Pulse 78   Temp 97.7  F (36.5  C) (Tympanic)   Resp 16   Ht 1.829 m (6')   Wt 67 kg (147 lb 12.8 oz)   LMP  (LMP Unknown)   SpO2 97%   BMI 20.05 kg/m     ECO  GENERAL/CONSTITUTIONAL: No acute distress.  EYES: No scleral icterus.  NEUROLOGIC: Alert, oriented, answers questions appropriately.  CARDIOVASCULAR: Normal S1/S1. No M/R/G.  RESPIRATORY: CTA B/L. No R/R/W.  INTEGUMENTARY: No jaundice.  GAIT: Requires the use of a cane.    LABS:   Latest Reference Range & Units 22 12:10   Sodium 133 - 144 mmol/L 138   Potassium 3.4 - 5.3 mmol/L 3.7   Chloride 94 - 109 mmol/L 106   Carbon Dioxide 20 - 32 mmol/L 25   Urea Nitrogen 7 -  30 mg/dL 13   Creatinine 0.52 - 1.04 mg/dL 0.65   GFR Estimate >60 mL/min/1.73m2 88   Calcium 8.5 - 10.1 mg/dL 8.8   Anion Gap 3 - 14 mmol/L 7   Albumin 3.4 - 5.0 g/dL 4.2   Protein Total 6.8 - 8.8 g/dL 6.6 (L)   Alkaline Phosphatase 40 - 150 U/L 114   ALT 0 - 50 U/L 26   AST 0 - 45 U/L 33   Bilirubin Total 0.2 - 1.3 mg/dL 1.1   CRP Inflammation <5.00 mg/L <3.00   Ferritin 8 - 252 ng/mL 30   Iron 35 - 180 ug/dL 132   Iron Binding Cap 240 - 430 ug/dL 318   Iron Saturation Index 15 - 46 % 42   Lactate Dehydrogenase 81 - 234 U/L 231   Rheumatoid Factor <12 IU/mL <6   Uric Acid 2.6 - 6.0 mg/dL 3.7   Glucose 70 - 99 mg/dL 95   WBC 4.0 - 11.0 10e3/uL 4.6   Hemoglobin 11.7 - 15.7 g/dL 12.9   Hematocrit 35.0 - 47.0 % 36.9   Platelet Count 150 - 450 10e3/uL 135 (L)   RBC Count 3.80 - 5.20 10e6/uL 3.69 (L)   MCV 78 - 100 fL 100   MCH 26.5 - 33.0 pg 35.0 (H)   MCHC 31.5 - 36.5 g/dL 35.0   RDW 10.0 - 15.0 % 14.4   % Neutrophils % 58   % Lymphocytes % 31   % Monocytes % 11   % Eosinophils % 0   % Basophils % 0   Absolute Basophils 0.0 - 0.2 10e3/uL 0.0   Absolute Eosinophils 0.0 - 0.7 10e3/uL 0.0   Absolute Immature Granulocytes <=0.4 10e3/uL 0.0   Absolute Lymphocytes 0.8 - 5.3 10e3/uL 1.4   Absolute Monocytes 0.0 - 1.3 10e3/uL 0.5   % Immature Granulocytes % 0   Absolute Neutrophils 1.6 - 8.3 10e3/uL 2.7   % Retic 0.5 - 2.0 % 2.6 (H)   Absolute Retic 0.025 - 0.095 10e6/uL 0.093   Sed Rate 0 - 30 mm/hr 40 (H)   D-Dimer Quantitative 0.00 - 0.50 ug/mL FEU <0.27   ANTI NUCLEAR BETTYE IGG BY IFA WITH REFLEX  Rpt   Haptoglobin 32 - 197 mg/dL <3 (L)   IGA 84 - 499 mg/dL <2 (L)    - 1,616 mg/dL 711   IGM 35 - 242 mg/dL 10 (L)       IMAGING:  BILATERAL FULL FIELD DIGITAL SCREENING MAMMOGRAM WITH TOMOSYNTHESIS     Performed on: 6/23/22     IMPRESSION: ACR BI-RADS Category 1: Negative     RECOMMENDED FOLLOW-UP: Annual routine screening mammogram      ASSESSMENT/PLAN:  Tricia Sosa is a 81 year old female with:        1. Warm  autoimmune hemolytic anemia (positive MARIKA to IgG and complement)  2. 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 Common variable immunodeficiency disorder  4. Splenomegaly           1) Autoimmune hemolytic anemia: She had relapse in July 2020, and started on prednisone, as well as weekly rituximab infusions x 4, completed on 8/21/20.  Since then, she has been off and on prednisone and rituximab, getting response each time, but relapses are getting more frequent.       She does not want to consider splenectomy yet.      She is completing another 4 doses of weekly rituxumab in May 2022, and then will go on maintenance rituximab to try to maintain her hemoglobin longer.  She wants to try to avoid having to go back on steroids if possible.     -She has tapered off of prednisone again as of early March 2022.    -Hemoglobin improved to 12.9.  -Monitor CBC monthly.  -She takes Bactrim for prophylaxis when she is on prednisone - currently off.  -Continue maintenance rituximab every 2 months.     2) CVID:   -Continue IVIG.  She gets it, if IgG is <600.  It's been about every other month.    -IgG check and IVIG every 4 weeks if meets parameters  -She saw immunology in [place] who recommended IVIG monthly, but our financial team continues to check and says that it is only covered with IgG is >600.      3) Iron-deficiency anemia  -s/p Venofer in May 2022.  -Recheck in 2 months.     4) Thrombocytopenia  -Waxing/waning.  -Monitor.    5) Follow up with FELIZ in 2 months with next date of rituximab. Alternate follow up between FELIZ and myself every 2 months with date of rituximab. Continue once monthly labs. IVIG is given essentially every other month at this point.    Leandra Ryder,   Hematology/Oncology  [facility]

## 2022-09-20 ENCOUNTER — ONCOLOGY VISIT (OUTPATIENT)
Dept: ONCOLOGY | Facility: CLINIC | Age: 82
End: 2022-09-20
Attending: INTERNAL MEDICINE
Payer: MEDICARE

## 2022-09-20 ENCOUNTER — INFUSION THERAPY VISIT (OUTPATIENT)
Dept: INFUSION THERAPY | Facility: CLINIC | Age: 82
End: 2022-09-20
Attending: INTERNAL MEDICINE
Payer: MEDICARE

## 2022-09-20 VITALS
OXYGEN SATURATION: 97 % | BODY MASS INDEX: 20.02 KG/M2 | HEART RATE: 78 BPM | WEIGHT: 147.8 LBS | SYSTOLIC BLOOD PRESSURE: 120 MMHG | HEIGHT: 72 IN | DIASTOLIC BLOOD PRESSURE: 64 MMHG | TEMPERATURE: 97.7 F | RESPIRATION RATE: 16 BRPM

## 2022-09-20 DIAGNOSIS — D83.9 CVID (COMMON VARIABLE IMMUNODEFICIENCY) (H): Primary | ICD-10-CM

## 2022-09-20 DIAGNOSIS — D59.19 OTHER AUTOIMMUNE HEMOLYTIC ANEMIA (H): ICD-10-CM

## 2022-09-20 DIAGNOSIS — D50.9 IRON DEFICIENCY ANEMIA, UNSPECIFIED IRON DEFICIENCY ANEMIA TYPE: ICD-10-CM

## 2022-09-20 DIAGNOSIS — D59.10 AUTOIMMUNE HEMOLYTIC ANEMIA (H): ICD-10-CM

## 2022-09-20 DIAGNOSIS — D83.9 CVID (COMMON VARIABLE IMMUNODEFICIENCY) (H): ICD-10-CM

## 2022-09-20 LAB
CCP AB SER IA-ACNC: 0.9 U/ML
DSDNA AB SER-ACNC: <0.6 IU/ML

## 2022-09-20 PROCEDURE — G0463 HOSPITAL OUTPT CLINIC VISIT: HCPCS | Mod: 25

## 2022-09-20 PROCEDURE — 250N000013 HC RX MED GY IP 250 OP 250 PS 637: Performed by: INTERNAL MEDICINE

## 2022-09-20 PROCEDURE — 96413 CHEMO IV INFUSION 1 HR: CPT

## 2022-09-20 PROCEDURE — 99214 OFFICE O/P EST MOD 30 MIN: CPT | Performed by: INTERNAL MEDICINE

## 2022-09-20 PROCEDURE — 258N000003 HC RX IP 258 OP 636: Performed by: INTERNAL MEDICINE

## 2022-09-20 PROCEDURE — 96415 CHEMO IV INFUSION ADDL HR: CPT

## 2022-09-20 PROCEDURE — 250N000011 HC RX IP 250 OP 636: Performed by: INTERNAL MEDICINE

## 2022-09-20 RX ORDER — DIPHENHYDRAMINE HCL 25 MG
25 CAPSULE ORAL ONCE
Status: COMPLETED | OUTPATIENT
Start: 2022-09-20 | End: 2022-09-20

## 2022-09-20 RX ORDER — ACETAMINOPHEN 325 MG/1
650 TABLET ORAL ONCE
Status: COMPLETED | OUTPATIENT
Start: 2022-09-20 | End: 2022-09-20

## 2022-09-20 RX ADMIN — SODIUM CHLORIDE 250 ML: 9 INJECTION, SOLUTION INTRAVENOUS at 11:48

## 2022-09-20 RX ADMIN — ACETAMINOPHEN 650 MG: 325 TABLET ORAL at 11:29

## 2022-09-20 RX ADMIN — RITUXIMAB-ABBS 700 MG: 10 INJECTION, SOLUTION INTRAVENOUS at 11:49

## 2022-09-20 RX ADMIN — DIPHENHYDRAMINE HYDROCHLORIDE 25 MG: 25 CAPSULE ORAL at 11:29

## 2022-09-20 ASSESSMENT — PAIN SCALES - GENERAL: PAINLEVEL: NO PAIN (0)

## 2022-09-20 NOTE — NURSING NOTE
Oncology Rooming Note    September 20, 2022 10:51 AM   Tricia Sosa is a 81 year old female who presents for:    Chief Complaint   Patient presents with     Oncology Clinic Visit     Autoimmune hemolytic anemia (H)    Dx         Initial Vitals: /64   Pulse 78   Temp 97.7  F (36.5  C) (Tympanic)   Resp 16   Ht 1.829 m (6')   Wt 67 kg (147 lb 12.8 oz)   LMP  (LMP Unknown)   SpO2 97%   BMI 20.05 kg/m   Estimated body mass index is 20.05 kg/m  as calculated from the following:    Height as of this encounter: 1.829 m (6').    Weight as of this encounter: 67 kg (147 lb 12.8 oz). Body surface area is 1.84 meters squared.  No Pain (0) Comment: Data Unavailable   No LMP recorded (lmp unknown). Patient is postmenopausal.  Allergies reviewed: Yes  Medications reviewed: Yes    Medications: Medication refills not needed today.  Pharmacy name entered into [facility]:    [place], [place] - [address] [id number] [place], [place] - [address]    Clinical concerns: follow up       Montserrat Hutchins, MOHSEN

## 2022-09-20 NOTE — PROGRESS NOTES
Infusion Nursing Note:  Maandrew Sosa presents today for C2D1 Rapid Rituxan.    Patient seen by provider today: Yes: Dr. Ryder   present during visit today: Not Applicable.    Note: N/A.    Intravenous Access:  Peripheral IV placed.    Treatment Conditions:  Not Applicable.    Post Infusion Assessment:  Patient tolerated infusion without incident.  Blood return noted pre and post infusion.  Site patent and intact, free from redness, edema or discomfort.  No evidence of extravasations.  Access discontinued per protocol.     Discharge Plan:   AVS to patient via MYCHART.  Patient will return 10/14/22 for next appointment.   Patient discharged in stable condition accompanied by: self.  Departure Mode: Ambulatory.      Roya Ng RN

## 2022-09-20 NOTE — LETTER
9/20/2022         RE: Tricia Sosa  [id number]  [facility] [id number]        Dear Colleague,    Thank you for referring your patient, Tricia Sosa, to the [facility]. Please see a copy of my visit note below.    [facility]    Hematology/Oncology Established Patient Follow-up Note    Treatment Summary:      Today's Date: 9/20/22    Reason for Follow-up: Hemolytic anemia-autoimmune; IgA deficiency     HISTORY OF PRESENT ILLNESS: Tricia Sosa is a 81 year old female who presents with autoimmune hemolytic anemia and IgA deficiency.  She previously saw Dr. Matos and then Dr. Summers.  She was previously seen at [facility].     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  She was very sick at the time and had severe diarrhea.  She lost about 40 pounds in weight.  The workup revealed that she had markedly diminished CD4 counts of 48 and was diagnosed with CMV colitis.  Since then she has been followed in hematology clinic at [place] until recently.  She was noted to have anemia which was fairly long-standing.  She was initially referred for anemia in 2004 and also had a bone marrow biopsy done at that time which revealed hypercellular bone marrow with 70-80% cellularity and normal trilineage hematopoiesis and no morphologic features of MDS or lymphoproliferation.  Her anemia was attributed to her chronic underlying disease.  At the next evaluation in 2002 on 4 aggressive anemia there was no evidence of hemolysis, iron deficiency, B12 or folate deficiency.  Bone marrow biopsy was not pursued.  In summer of 2015 she had progressive fatigue, dyspnea on exertion and worsening anemia with a hemoglobin now of 9.  Workup at this time did suggest a hemolytic anemia with elevated LDH at 709, undetectable haptoglobin and elevated unconjugated bilirubin at 3.3.  Monospecific MARIKA was positive for both IgG and complement.   Cold agglutinin screen was positive with very low titer 1:64.  She was started on prednisone 60 mg daily with supplementation of iron folate and B12 starting 7/31/15.  Her prednisone has been slowly tapered and was discontinued off in January 2016.  She was last followed at [facility] on March 10, 2016 when she had stable hemoglobin.     She was followed by Dr. Matos and Dr. Summers.  Due to relapse of hemolytic anemia, she underwent another course of prednisone that has since been tapered off and rituximab x 4 weekly doses completed in 9/19/19.     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  She started weekly Rituxan on 7/31/20 x 4 doses, completed on 8/21/20.  She tapered off of prednisone in October 2020.     Due to relapse of her AIHA, she started prednisone again on 6/8/21 at 60 mg daily with slow taper and finished taper in August 2021.     Due to relapse of AIHA again, she started prednisone again on 11/2/21 at 70 mg daily with slow taper.  She also completed weekly Rituxan again x 4 doses 12/6/21-12/29/21.      INTERIM HISTORY:  Venofer 5/5-5/19.    Patient continues to do well.  She last received rituximab on 7/25/22. She continues now on maintenance every 2 months.     IVIG was last given on 8/19/22.     No weight loss, fatigue, LAD, night sweats, fever. No gross evidence of bleed.     REVIEW OF SYSTEMS:   A 14 point ROS was reviewed with pertinent positives and negatives in the HPI.       HOME MEDICATIONS:  Current Outpatient Medications   Medication Sig Dispense Refill     clobetasol (TEMOVATE) 0.05 % external solution Apply topically daily as needed (itch) 50 mL 0     cyanocobalamin (VITAMIN  B-12) 1000 MCG tablet   3     Flaxseed, Linseed, (FLAX SEEDS PO) Take by mouth daily       folic acid (FOLVITE) 1 MG tablet   3     hydrochlorothiazide (HYDRODIURIL) 12.5 MG tablet Take 1 tablet (12.5 mg) by mouth daily as needed (edema) 30 tablet 5      ketoconazole (NIZORAL) 2 % external shampoo Use every other day to scalp as needed 120 mL 11     levothyroxine (SYNTHROID/LEVOTHROID) 150 MCG tablet Take 1 tablet  by mouth daily 90 tablet 0     UNABLE TO FIND privigen inj every two months           ALLERGIES:  Allergies   Allergen Reactions     Doxycycline          PAST MEDICAL HISTORY:  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, and posterior vitreous detachment     Urinary tract infection, site not specified     Recurrent UTI's         PAST SURGICAL HISTORY:  Past Surgical History:   Procedure Laterality Date     COLONOSCOPY N/A 4/26/2016    Procedure: COLONOSCOPY;  Surgeon: Dontae Del Rio MD;  Location:  GI     ENT SURGERY  1985    Thyroid lobectomy     EYE SURGERY Bilateral 04/20/2021    Cataract Surgery     HC CYSTOSCOPY,INSERT URETERAL STENT      Ureteral stent insertion     HC FLEX SIGMOIDOSCOPY W BIOPSY  5/30/00      LAPAROSCOPY, SURGICAL; CHOLECYSTECTOMY  1992      THYROIDECTOMY       LIGATION OF HEMORRHOID(S)      Hemorrhoidectomy     UPPER GI ENDOSCOPY,BIOPSY  10/01/01     [facility] LIGATE FALLOPIAN TUBE       [facility] COLONOSCOPY W BIOPSY  4/26/00     ZZ COLONOSCOPY W BIOPSY  10/8/03     ZZ COLONOSCOPY W BIOPSY  6/27/05    REPEAT IN 5 YEARS         SOCIAL HISTORY:  Social History     Socioeconomic History     Marital status:      Spouse name: Not on file     Number of children: Not on  file     Years of education: Not on file     Highest education level: Not on file   Occupational History     Not on file   Tobacco Use     Smoking status: Never Smoker     Smokeless tobacco: Never Used   Substance and Sexual Activity     Alcohol use: Yes     Alcohol/week: 0.0 standard drinks     Comment: 1 a day at most     Drug use: No     Sexual activity: Not Currently     Partners: Male   Other Topics Concern     Parent/sibling w/ CABG, MI or angioplasty before 65F 55M? No   Social History Narrative     Not on file     Social Determinants of Health     Financial Resource Strain: Not on file   Food Insecurity: Not on file   Transportation Needs: Not on file   Physical Activity: Not on file   Stress: Not on file   Social Connections: Not on file   Intimate Partner Violence: Not At Risk     Fear of Current or Ex-Partner: No     Emotionally Abused: No     Physically Abused: No     Sexually Abused: No   Housing Stability: Not on file         FAMILY HISTORY:  Family History   Problem Relation Age of Onset     Colon Cancer Mother 90     Cerebrovascular Disease Father          at age 85     Dementia Brother      Prostate Cancer Brother      Thyroid Disease Brother      Dementia Brother      Thyroid Disease Brother      Pancreatic Cancer Brother      Breast Cancer Sister      Hyperlipidemia Sister      Depression Sister      Anxiety Disorder Sister      Thyroid Disease Sister      Breast Cancer Sister      Colon Cancer Niece      Other Cancer Niece         leukemia         PHYSICAL EXAM:  Vital signs:  /64   Pulse 78   Temp 97.7  F (36.5  C) (Tympanic)   Resp 16   Ht 1.829 m (6')   Wt 67 kg (147 lb 12.8 oz)   LMP  (LMP Unknown)   SpO2 97%   BMI 20.05 kg/m     ECO  GENERAL/CONSTITUTIONAL: No acute distress.  EYES: No scleral icterus.  NEUROLOGIC: Alert, oriented, answers questions appropriately.  CARDIOVASCULAR: Normal S1/S1. No M/R/G.  RESPIRATORY: CTA B/L. No R/R/W.  INTEGUMENTARY: No  jaundice.  GAIT: Requires the use of a cane.    LABS:   Latest Reference Range & Units 09/16/22 12:10   Sodium 133 - 144 mmol/L 138   Potassium 3.4 - 5.3 mmol/L 3.7   Chloride 94 - 109 mmol/L 106   Carbon Dioxide 20 - 32 mmol/L 25   Urea Nitrogen 7 - 30 mg/dL 13   Creatinine 0.52 - 1.04 mg/dL 0.65   GFR Estimate >60 mL/min/1.73m2 88   Calcium 8.5 - 10.1 mg/dL 8.8   Anion Gap 3 - 14 mmol/L 7   Albumin 3.4 - 5.0 g/dL 4.2   Protein Total 6.8 - 8.8 g/dL 6.6 (L)   Alkaline Phosphatase 40 - 150 U/L 114   ALT 0 - 50 U/L 26   AST 0 - 45 U/L 33   Bilirubin Total 0.2 - 1.3 mg/dL 1.1   CRP Inflammation <5.00 mg/L <3.00   Ferritin 8 - 252 ng/mL 30   Iron 35 - 180 ug/dL 132   Iron Binding Cap 240 - 430 ug/dL 318   Iron Saturation Index 15 - 46 % 42   Lactate Dehydrogenase 81 - 234 U/L 231   Rheumatoid Factor <12 IU/mL <6   Uric Acid 2.6 - 6.0 mg/dL 3.7   Glucose 70 - 99 mg/dL 95   WBC 4.0 - 11.0 10e3/uL 4.6   Hemoglobin 11.7 - 15.7 g/dL 12.9   Hematocrit 35.0 - 47.0 % 36.9   Platelet Count 150 - 450 10e3/uL 135 (L)   RBC Count 3.80 - 5.20 10e6/uL 3.69 (L)   MCV 78 - 100 fL 100   MCH 26.5 - 33.0 pg 35.0 (H)   MCHC 31.5 - 36.5 g/dL 35.0   RDW 10.0 - 15.0 % 14.4   % Neutrophils % 58   % Lymphocytes % 31   % Monocytes % 11   % Eosinophils % 0   % Basophils % 0   Absolute Basophils 0.0 - 0.2 10e3/uL 0.0   Absolute Eosinophils 0.0 - 0.7 10e3/uL 0.0   Absolute Immature Granulocytes <=0.4 10e3/uL 0.0   Absolute Lymphocytes 0.8 - 5.3 10e3/uL 1.4   Absolute Monocytes 0.0 - 1.3 10e3/uL 0.5   % Immature Granulocytes % 0   Absolute Neutrophils 1.6 - 8.3 10e3/uL 2.7   % Retic 0.5 - 2.0 % 2.6 (H)   Absolute Retic 0.025 - 0.095 10e6/uL 0.093   Sed Rate 0 - 30 mm/hr 40 (H)   D-Dimer Quantitative 0.00 - 0.50 ug/mL FEU <0.27   ANTI NUCLEAR BETTYE IGG BY IFA WITH REFLEX  Rpt   Haptoglobin 32 - 197 mg/dL <3 (L)   IGA 84 - 499 mg/dL <2 (L)    - 1,616 mg/dL 711   IGM 35 - 242 mg/dL 10 (L)       IMAGING:  BILATERAL FULL FIELD DIGITAL SCREENING  MAMMOGRAM WITH TOMOSYNTHESIS     Performed on: 6/23/22     IMPRESSION: ACR BI-RADS Category 1: Negative     RECOMMENDED FOLLOW-UP: Annual routine screening mammogram      ASSESSMENT/PLAN:  Tricia Sosa is a 81 year old female with:        1. 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 Common variable immunodeficiency disorder  4. Splenomegaly           1) Autoimmune hemolytic anemia: She had relapse in July 2020, and started on prednisone, as well as weekly rituximab infusions x 4, completed on 8/21/20.  Since then, she has been off and on prednisone and rituximab, getting response each time, but relapses are getting more frequent.       She does not want to consider splenectomy yet.      She is completing another 4 doses of weekly rituxumab in May 2022, and then will go on maintenance rituximab to try to maintain her hemoglobin longer.  She wants to try to avoid having to go back on steroids if possible.     -She has tapered off of prednisone again as of early March 2022.    -Hemoglobin improved to 12.9.  -Monitor CBC monthly.  -She takes Bactrim for prophylaxis when she is on prednisone - currently off.  -Continue maintenance rituximab every 2 months.     2) CVID:   -Continue IVIG.  She gets it, if IgG is <600.  It's been about every other month.    -IgG check and IVIG every 4 weeks if meets parameters  -She saw immunology in [place] who recommended IVIG monthly, but our financial team continues to check and says that it is only covered with IgG is >600.      3) Iron-deficiency anemia  -s/p Venofer in May 2022.  -Recheck in 2 months.     4) Thrombocytopenia  -Waxing/waning.  -Monitor.    5) Follow up with FELIZ in 2 months with next date of rituximab. Alternate follow up between FELIZ and myself every 2 months with date of rituximab. Continue once monthly labs. IVIG is given essentially every other month at this  point.    Leandra Ryder DO  Hematology/Oncology  [facility]          Again, thank you for allowing me to participate in the care of your patient.        Sincerely,        Leandra Ryder DO

## 2022-09-28 ENCOUNTER — HOSPITAL ENCOUNTER (OUTPATIENT)
Dept: PHYSICAL THERAPY | Facility: CLINIC | Age: 82
Setting detail: THERAPIES SERIES
Discharge: HOME | End: 2022-09-28
Attending: STUDENT IN AN ORGANIZED HEALTH CARE EDUCATION/TRAINING PROGRAM
Payer: MEDICARE

## 2022-09-28 PROCEDURE — 97750 PHYSICAL PERFORMANCE TEST: CPT | Mod: GP | Performed by: PHYSICAL THERAPIST

## 2022-09-28 PROCEDURE — 97110 THERAPEUTIC EXERCISES: CPT | Mod: GP | Performed by: PHYSICAL THERAPIST

## 2022-10-04 ENCOUNTER — OFFICE VISIT (OUTPATIENT)
Dept: ORTHOPEDICS | Facility: CLINIC | Age: 82
End: 2022-10-04
Payer: MEDICARE

## 2022-10-04 DIAGNOSIS — G89.29 CHRONIC PAIN OF LEFT KNEE: Primary | ICD-10-CM

## 2022-10-04 DIAGNOSIS — M25.551 CHRONIC RIGHT HIP PAIN: ICD-10-CM

## 2022-10-04 DIAGNOSIS — G89.29 CHRONIC RIGHT HIP PAIN: ICD-10-CM

## 2022-10-04 DIAGNOSIS — M25.562 CHRONIC PAIN OF LEFT KNEE: Primary | ICD-10-CM

## 2022-10-04 DIAGNOSIS — M17.10 ARTHRITIS OF KNEE: ICD-10-CM

## 2022-10-04 PROCEDURE — 99212 OFFICE O/P EST SF 10 MIN: CPT | Mod: 25 | Performed by: ORTHOPAEDIC SURGERY

## 2022-10-04 PROCEDURE — 20610 DRAIN/INJ JOINT/BURSA W/O US: CPT | Mod: LT | Performed by: ORTHOPAEDIC SURGERY

## 2022-10-04 RX ORDER — LIDOCAINE HYDROCHLORIDE 10 MG/ML
3 INJECTION, SOLUTION INFILTRATION; PERINEURAL
Status: DISCONTINUED | OUTPATIENT
Start: 2022-10-04 | End: 2022-11-01

## 2022-10-04 RX ORDER — BUPIVACAINE HYDROCHLORIDE 5 MG/ML
3 INJECTION, SOLUTION PERINEURAL
Status: DISCONTINUED | OUTPATIENT
Start: 2022-10-04 | End: 2022-11-01

## 2022-10-04 RX ORDER — TRIAMCINOLONE ACETONIDE 40 MG/ML
40 INJECTION, SUSPENSION INTRA-ARTICULAR; INTRAMUSCULAR
Status: DISCONTINUED | OUTPATIENT
Start: 2022-10-04 | End: 2022-11-01

## 2022-10-04 RX ADMIN — LIDOCAINE HYDROCHLORIDE 3 ML: 10 INJECTION, SOLUTION INFILTRATION; PERINEURAL at 14:41

## 2022-10-04 RX ADMIN — TRIAMCINOLONE ACETONIDE 40 MG: 40 INJECTION, SUSPENSION INTRA-ARTICULAR; INTRAMUSCULAR at 14:41

## 2022-10-04 RX ADMIN — BUPIVACAINE HYDROCHLORIDE 3 ML: 5 INJECTION, SOLUTION PERINEURAL at 14:41

## 2022-10-04 NOTE — PROGRESS NOTES
Patient is an 81 year old, female seen today in follow up for right knee pain and new right hip pain.    They were previously evaluated on 9/6/2022,  Since this visit they report no relief from instability following the CSI done at last visit.  Pain is located at the posterior aspect of the right knee, and described as sharp.  Increased pain with increased activities.  Pain sometimes wakes at nighttime. Pain is improved by nothing yet.  They have tried the following therapies: exercises, CSI.    Current pain level: 0/10, Worst pain level: 5/10.     R knee still has sense of instability and occasionally pain, R hip pain occasional, L knee also pain occasional with exercise.           Patient Active Problem List   Diagnosis     Lymphocytic colitis     Acquired hypothyroidism     CVID (common variable immunodeficiency) (H)     B12 deficiency     CARDIOVASCULAR SCREENING; LDL GOAL LESS THAN 130     Other autoimmune hemolytic anemia (H)     Right-sided low back pain with right-sided sciatica, unspecified chronicity     Autoimmune hemolytic anemia (H)     Ulcerative colitis (H)     Bronchiectasis (H)     Mild obstructive sleep apnea     Selective deficiency of IgG subclasses (H)     Anemia, iron deficiency            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, and posterior vitreous detachment     Urinary tract infection, site not specified     Recurrent UTI's             Past Surgical History:   Procedure Laterality Date     COLONOSCOPY N/A 2016    Procedure: COLONOSCOPY;  Surgeon: Dontae Del Rio MD;  Location:  GI     ENT SURGERY      Thyroid lobectomy     EYE SURGERY Bilateral 2021    Cataract Surgery     HC CYSTOSCOPY,INSERT URETERAL STENT      Ureteral stent insertion     HC FLEX SIGMOIDOSCOPY W BIOPSY  00     HC LAPAROSCOPY, SURGICAL; CHOLECYSTECTOMY        THYROIDECTOMY       LIGATION OF HEMORRHOID(S)      Hemorrhoidectomy     UPPER GI ENDOSCOPY,BIOPSY  10/01/01     ZZ LIGATE FALLOPIAN TUBE       ZZ COLONOSCOPY W BIOPSY  00     ZZHC COLONOSCOPY W BIOPSY  10/8/03     ZZHC COLONOSCOPY W BIOPSY  05    REPEAT IN 5 YEARS            Social History     Tobacco Use     Smoking status: Never Smoker     Smokeless tobacco: Never Used   Substance Use Topics     Alcohol use: Yes     Alcohol/week: 0.0 standard drinks     Comment: 1 a day at most            Family History   Problem Relation Age of Onset     Colon Cancer Mother 90     Cerebrovascular Disease Father          at age 85     Dementia Brother      Prostate Cancer Brother      Thyroid Disease Brother      Dementia Brother      Thyroid Disease Brother      Pancreatic Cancer Brother      Breast Cancer Sister      Hyperlipidemia Sister      Depression Sister      Anxiety Disorder Sister      Thyroid Disease Sister      Breast Cancer Sister      Colon Cancer Niece      Other Cancer Niece         leukemia               Allergies   Allergen Reactions     Doxycycline             Current Outpatient Medications   Medication Sig Dispense Refill     clobetasol (TEMOVATE) 0.05 % external solution Apply topically daily as needed (itch) 50 mL 0     cyanocobalamin (VITAMIN  B-12) 1000 MCG tablet   3     Flaxseed, Linseed, (FLAX SEEDS PO) Take by mouth daily       folic acid (FOLVITE) 1 MG tablet   3     hydrochlorothiazide (HYDRODIURIL) 12.5 MG tablet Take 1 tablet (12.5 mg) by mouth daily as  needed (edema) 30 tablet 5     ketoconazole (NIZORAL) 2 % external shampoo Use every other day to scalp as needed 120 mL 11     levothyroxine (SYNTHROID/LEVOTHROID) 150 MCG tablet Take 1 tablet  by mouth daily 90 tablet 0     UNABLE TO FIND privigen inj every two months            Review Of Systems  Skin: negative  Eyes: negative  Ears/Nose/Throat: negative  Respiratory: No shortness of breath, dyspnea on exertion, cough, or hemoptysis    O: Physical Exam:  Baker's cyst R knee to palpation, some patellofemoral crepitus, adequate knee flexion extension. Somewhat limited R hip IR compared to L hip.  L knee with parapatellar tenderness to palpation.    Images:  Knee:                                                                   IMPRESSION: No fracture. Mild degenerative changes in the medial and  lateral compartments. Advanced degenerative changes in the  patellofemoral compartment. Small loose body in the suprapatellar  recess. Osteopenia.                                                                      IMPRESSION: No fracture. Moderate degenerative changes in the right  hip. Mild degenerative changes in the left hip. Osteopenia.    A:  OA R hip, patellofemoral OA each knee    P:  Inject L knee joint  Order viscosupplementation R knee for next visit  Consider R hip joint injection in future to see if that is contributing to R knee symptoms  Notify if exacerbation symptoms  Inject L knee joint after verbal and written consent/ethyl chloride/chloroprep  Follow outcomes  See back one month           In addition to the above assessment and plan each active problem on Tricia's problem list was evaluated today. This included the questioning of Tricia for any medication problems. We will continue the current treatment plan for these active problems except as noted.         Large Joint Injection/Arthocentesis: L knee joint    Date/Time: 10/4/2022 2:41 PM  Performed by: Gurjit Castillo MD  Authorized by: Gurjit Castillo MD      Indications:  Pain  Guidance: landmark guided    Location:  Knee      Medications:  3 mL lidocaine 1 %; 3 mL bupivacaine 0.5 %; 40 mg triamcinolone 40 MG/ML  Procedure discussed: discussed risks, benefits, and alternatives    Prep: patient was prepped and draped in usual sterile fashion

## 2022-10-04 NOTE — LETTER
10/4/2022         RE: Tricia Sosa  [id number]  [facility] [address]        Dear Colleague,    Thank you for referring your patient, Tricia Sosa, to the [facility] [place]. Please see a copy of my visit note below.    Patient is an 81 year old, female seen today in follow up for right knee pain and new right hip pain.    They were previously evaluated on 9/6/2022,  Since this visit they report no relief from instability following the CSI done at last visit.  Pain is located at the posterior aspect of the right knee, and described as sharp.  Increased pain with increased activities.  Pain sometimes wakes at nighttime. Pain is improved by nothing yet.  They have tried the following therapies: exercises, CSI.    Current pain level: 0/10, Worst pain level: 5/10.     R knee still has sense of instability and occasionally pain, R hip pain occasional, L knee also pain occasional with exercise.           Patient Active Problem List   Diagnosis     Lymphocytic colitis     Acquired hypothyroidism     CVID (common variable immunodeficiency) (H)     B12 deficiency     CARDIOVASCULAR SCREENING; LDL GOAL LESS THAN 130     Other autoimmune hemolytic anemia (H)     Right-sided low back pain with right-sided sciatica, unspecified chronicity     Autoimmune hemolytic anemia (H)     Ulcerative colitis (H)     Bronchiectasis (H)     Mild obstructive sleep apnea     Selective deficiency of IgG subclasses (H)     Anemia, iron deficiency            Past Medical History:   Diagnosis Date     WARD (dyspnea on exertion)      External hemorrhoids without mention of complication 6/27/05     Hemolytic anemia (H)     autoimmune     Hypothyroidism      IgA deficiency (H)      Myopia of both eyes with astigmatism and presbyopia 8/16/2017     Other malaise and fatigue      Other severe protein-calorie malnutrition      Other vitreous opacities 12/19/2006     Thyroid disease      Traumatic intracranial  subdural hematoma (H) 2014    Hemorrhage Subdural Trauma Without LOC Active     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, and posterior vitreous detachment     Urinary tract infection, site not specified     Recurrent UTI's            Past Surgical History:   Procedure Laterality Date     COLONOSCOPY N/A 2016    Procedure: COLONOSCOPY;  Surgeon: Dontae Del Rio MD;  Location:  GI     ENT SURGERY      Thyroid lobectomy     EYE SURGERY Bilateral 2021    Cataract Surgery     HC CYSTOSCOPY,INSERT URETERAL STENT      Ureteral stent insertion     HC FLEX SIGMOIDOSCOPY W BIOPSY  00     HC LAPAROSCOPY, SURGICAL; CHOLECYSTECTOMY        THYROIDECTOMY       LIGATION OF HEMORRHOID(S)      Hemorrhoidectomy     UPPER GI ENDOSCOPY,BIOPSY  10/01/01     ZZC LIGATE FALLOPIAN TUBE       ZZ COLONOSCOPY W BIOPSY  00     ZZHC COLONOSCOPY W BIOPSY  10/8/03     ZZ COLONOSCOPY W BIOPSY  05    REPEAT IN 5 YEARS            Social History     Tobacco Use     Smoking status: Never Smoker     Smokeless tobacco: Never Used   Substance Use Topics     Alcohol use: Yes     Alcohol/week: 0.0 standard drinks     Comment: 1 a day at most            Family History   Problem Relation Age of Onset     Colon Cancer Mother 90     Cerebrovascular Disease Father          at age 85     Dementia Brother      Prostate Cancer Brother      Thyroid Disease Brother      Dementia Brother      Thyroid Disease Brother      Pancreatic Cancer Brother      Breast Cancer Sister      Hyperlipidemia Sister      Depression Sister      Anxiety Disorder Sister      Thyroid Disease Sister      Breast Cancer Sister      Colon Cancer Niece      Other Cancer Niece         leukemia               Allergies   Allergen Reactions     Doxycycline             Current Outpatient Medications   Medication Sig Dispense Refill     clobetasol (TEMOVATE) 0.05 % external solution Apply topically daily as needed  (itch) 50 mL 0     cyanocobalamin (VITAMIN  B-12) 1000 MCG tablet   3     Flaxseed, Linseed, (FLAX SEEDS PO) Take by mouth daily       folic acid (FOLVITE) 1 MG tablet   3     hydrochlorothiazide (HYDRODIURIL) 12.5 MG tablet Take 1 tablet (12.5 mg) by mouth daily as needed (edema) 30 tablet 5     ketoconazole (NIZORAL) 2 % external shampoo Use every other day to scalp as needed 120 mL 11     levothyroxine (SYNTHROID/LEVOTHROID) 150 MCG tablet Take 1 tablet  by mouth daily 90 tablet 0     UNABLE TO FIND privigen inj every two months            Review Of Systems  Skin: negative  Eyes: negative  Ears/Nose/Throat: negative  Respiratory: No shortness of breath, dyspnea on exertion, cough, or hemoptysis    O: Physical Exam:  Baker's cyst R knee to palpation, some patellofemoral crepitus, adequate knee flexion extension. Somewhat limited R hip IR compared to L hip.  L knee with parapatellar tenderness to palpation.    Images:  Knee:                                                                   IMPRESSION: No fracture. Mild degenerative changes in the medial and  lateral compartments. Advanced degenerative changes in the  patellofemoral compartment. Small loose body in the suprapatellar  recess. Osteopenia.                                                                      IMPRESSION: No fracture. Moderate degenerative changes in the right  hip. Mild degenerative changes in the left hip. Osteopenia.    A:  OA R hip, patellofemoral OA each knee    P:  Inject L knee joint  Order viscosupplementation R knee for next visit  Consider R hip joint injection in future to see if that is contributing to R knee symptoms  Notify if exacerbation symptoms  Inject L knee joint after verbal and written consent/ethyl chloride/chloroprep  Follow outcomes  See back one month           In addition to the above assessment and plan each active problem on Tricia's problem list was evaluated today. This included the questioning of Tricia for  any medication problems. We will continue the current treatment plan for these active problems except as noted.         Large Joint Injection/Arthocentesis: L knee joint    Date/Time: 10/4/2022 2:41 PM  Performed by: Denia Gao MD  Authorized by: Denia Gao MD     Indications:  Pain  Guidance: landmark guided    Location:  Knee      Medications:  3 mL lidocaine 1 %; 3 mL bupivacaine 0.5 %; 40 mg triamcinolone 40 MG/ML  Procedure discussed: discussed risks, benefits, and alternatives    Prep: patient was prepped and draped in usual sterile fashion              Again, thank you for allowing me to participate in the care of your patient.        Sincerely,        DENIA GAO MD

## 2022-10-05 ENCOUNTER — HOSPITAL ENCOUNTER (OUTPATIENT)
Dept: PHYSICAL THERAPY | Facility: CLINIC | Age: 82
Setting detail: THERAPIES SERIES
Discharge: HOME | End: 2022-10-05
Attending: STUDENT IN AN ORGANIZED HEALTH CARE EDUCATION/TRAINING PROGRAM
Payer: MEDICARE

## 2022-10-05 PROCEDURE — 97110 THERAPEUTIC EXERCISES: CPT | Mod: GP | Performed by: PHYSICAL THERAPIST

## 2022-10-10 ENCOUNTER — LAB (OUTPATIENT)
Dept: ONCOLOGY | Facility: CLINIC | Age: 82
End: 2022-10-10
Attending: INTERNAL MEDICINE
Payer: MEDICARE

## 2022-10-10 DIAGNOSIS — D59.10 AUTOIMMUNE HEMOLYTIC ANEMIA (H): ICD-10-CM

## 2022-10-10 LAB
ALBUMIN SERPL BCG-MCNC: 4.1 G/DL (ref 3.5–5.2)
ALP SERPL-CCNC: 138 U/L (ref 35–104)
ALT SERPL W P-5'-P-CCNC: 18 U/L (ref 10–35)
ANION GAP SERPL CALCULATED.3IONS-SCNC: 7 MMOL/L (ref 7–15)
AST SERPL W P-5'-P-CCNC: 31 U/L (ref 10–35)
BASOPHILS # BLD AUTO: 0 10E3/UL (ref 0–0.2)
BASOPHILS NFR BLD AUTO: 0 %
BILIRUB SERPL-MCNC: 0.8 MG/DL
BUN SERPL-MCNC: 10.2 MG/DL (ref 8–23)
CALCIUM SERPL-MCNC: 8.6 MG/DL (ref 8.8–10.2)
CHLORIDE SERPL-SCNC: 103 MMOL/L (ref 98–107)
CREAT SERPL-MCNC: 0.58 MG/DL (ref 0.51–0.95)
DEPRECATED HCO3 PLAS-SCNC: 29 MMOL/L (ref 22–29)
EOSINOPHIL # BLD AUTO: 0.1 10E3/UL (ref 0–0.7)
EOSINOPHIL NFR BLD AUTO: 1 %
ERYTHROCYTE [DISTWIDTH] IN BLOOD BY AUTOMATED COUNT: 14.3 % (ref 10–15)
GFR SERPL CREATININE-BSD FRML MDRD: 90 ML/MIN/1.73M2
GLUCOSE SERPL-MCNC: 80 MG/DL (ref 70–99)
HCT VFR BLD AUTO: 36.6 % (ref 35–47)
HGB BLD-MCNC: 12.4 G/DL (ref 11.7–15.7)
IMM GRANULOCYTES # BLD: 0 10E3/UL
IMM GRANULOCYTES NFR BLD: 0 %
LDH SERPL L TO P-CCNC: 240 U/L (ref 0–250)
LYMPHOCYTES # BLD AUTO: 1.4 10E3/UL (ref 0.8–5.3)
LYMPHOCYTES NFR BLD AUTO: 28 %
MCH RBC QN AUTO: 34.7 PG (ref 26.5–33)
MCHC RBC AUTO-ENTMCNC: 33.9 G/DL (ref 31.5–36.5)
MCV RBC AUTO: 103 FL (ref 78–100)
MONOCYTES # BLD AUTO: 0.7 10E3/UL (ref 0–1.3)
MONOCYTES NFR BLD AUTO: 13 %
NEUTROPHILS # BLD AUTO: 2.9 10E3/UL (ref 1.6–8.3)
NEUTROPHILS NFR BLD AUTO: 58 %
NRBC # BLD AUTO: 0 10E3/UL
NRBC BLD AUTO-RTO: 0 /100
PLATELET # BLD AUTO: 165 10E3/UL (ref 150–450)
POTASSIUM SERPL-SCNC: 3.5 MMOL/L (ref 3.4–5.3)
PROT SERPL-MCNC: 5.9 G/DL (ref 6.4–8.3)
RBC # BLD AUTO: 3.57 10E6/UL (ref 3.8–5.2)
RETICS # AUTO: 0.1 10E6/UL (ref 0.03–0.1)
RETICS/RBC NFR AUTO: 2.7 % (ref 0.5–2)
SODIUM SERPL-SCNC: 139 MMOL/L (ref 136–145)
WBC # BLD AUTO: 5.1 10E3/UL (ref 4–11)

## 2022-10-10 PROCEDURE — 83010 ASSAY OF HAPTOGLOBIN QUANT: CPT | Performed by: INTERNAL MEDICINE

## 2022-10-10 PROCEDURE — 85045 AUTOMATED RETICULOCYTE COUNT: CPT | Performed by: INTERNAL MEDICINE

## 2022-10-10 PROCEDURE — 82040 ASSAY OF SERUM ALBUMIN: CPT | Performed by: INTERNAL MEDICINE

## 2022-10-10 PROCEDURE — 82784 ASSAY IGA/IGD/IGG/IGM EACH: CPT | Performed by: INTERNAL MEDICINE

## 2022-10-10 PROCEDURE — 36415 COLL VENOUS BLD VENIPUNCTURE: CPT

## 2022-10-10 PROCEDURE — 83615 LACTATE (LD) (LDH) ENZYME: CPT | Performed by: INTERNAL MEDICINE

## 2022-10-10 PROCEDURE — 80053 COMPREHEN METABOLIC PANEL: CPT | Performed by: INTERNAL MEDICINE

## 2022-10-10 PROCEDURE — 85014 HEMATOCRIT: CPT | Performed by: INTERNAL MEDICINE

## 2022-10-10 NOTE — PROGRESS NOTES
Medical Assistant Note:  Tricia Sosa presents today for blood draw.    Patient seen by provider today: No.   present during visit today: Not Applicable.    Concerns: No Concerns.    Procedure:  Lab draw site: rt antecub, Needle type: butterfly, Gauge: 23 .    Post Assessment:  Labs drawn without difficulty: Yes.    Discharge Plan:  Departure Mode: Ambulatory.    Face to Face Time: 10 mins.    Corrie Hurt CMA

## 2022-10-11 LAB
HAPTOGLOB SERPL-MCNC: <3 MG/DL (ref 32–197)
IGA SERPL-MCNC: <2 MG/DL (ref 84–499)
IGG SERPL-MCNC: 530 MG/DL (ref 610–1616)
IGM SERPL-MCNC: <10 MG/DL (ref 35–242)

## 2022-10-11 NOTE — PROGRESS NOTES
[facility]    OUTPATIENT PHYSICAL THERAPY  PLAN OF TREATMENT FOR OUTPATIENT REHABILITATION AND PROGRESS NOTE           Patient's Last Name, First Name, Tricia Jc Date of Birth  1940   Provider's Name  [facility] Medical Record No.  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   Target Date 09/22/22   Date Met      Progress (detail required for progress note): 8/9 - in progress.  modifications and progressions continue.     Goal Identifier Gait Tolerance   Goal Description Tricia will be able to walk 300 feet with her cane to demonstrate increased tolerance for community mobility for attending MD appointments and shopping tasks.   Target Date 09/22/22   Date Met   in progress   Progress (detail required for progress note): 8/9 - not walking very far, gets tired, can't stand upright very long.  9/28/22 - improving tolerance with cane but still flexed fwd, difficulty sustaining upright posture (although improved).       Goal Identifier Standing Tolerance   Goal Description Tricia will be able to tolerate standing for 15 minutes in order to better tolerate household tasks and ADLs such as washing dishes, showering/self cares, etc to increase her functional independence.   Target Date 09/22/22   Date Met   9/28/22 -    Progress (detail required for progress note): 8/9 - states she can do things longer than 15 minutes but will have to sit and rest afterward, feels ache in low back.  9/28/22 - family still does most of household tasks but pt is reporting increase in tolerance for activity In general.       Goal Identifier Griffin   Goal Description Tricia will be able to score 46/56 or greater on the Griffin to demonstrate low falls risk with daily activities.   Target Date 09/22/22   Date Met   in progress   Progress (detail required for progress note): 8/9 - not able to test today d/t time.  will test at next session. 9/28/22 - not fully met but demos increase in score to 42/56.             Beginning/End Dates of Progress Note Reporting Period:  8/9/22 to 9/28/22    Progress Toward Goals:   Progress this reporting period: gradual improvement in overall functional performance and activity.  See goals.          Progress limited due to deconditioning, decreased postural strength    Plan: continue with goals 1.2 and 4.      Client Self (Subjective) Report for Progress Note Reporting Period: Reports she had a fall 1 week ago.  Dog ran at her and hit her in the leg and she fell.  Denies injury.  had injection in right knee about 3 weeks ago.  was hoping it would give her stability but hasn't felt much different.  has order to see orthotist for back and knee brace.                I CERTIFY THE NEED FOR THESE SERVICES FURNISHED UNDER        THIS PLAN OF TREATMENT AND WHILE UNDER MY CARE     (Physician co-signature of this document indicates review and certification of the therapy plan).                Referring Provider: MD Genny Trevizo, PT

## 2022-10-11 NOTE — PROGRESS NOTES
09/28/22 1200   Signing Clinician's Name / Credentials   Signing clinician's name / credentials Genny Daugherty PT   Alegria Balance Scale (ALEGRIA KAYLI, CARL S, LISHA NJ, LINDA B: MEASURING BALANCE IN THE ELDERLY: VALIDATION OF AN INSTRUMENT. CAN. J. PUB. HEALTH, JULY/AUGUST SUPPLEMENT 2:S7-11, 1992.)   Sit To Stand 3   Standing Unsupported 3   Sitting Unsupported 4   Stand to Sit 3   Transfers 3   Standing with Eyes Closed 4   Standing Unsupported, Feet Together 3   Reach Forward With Outstretched Arm 4   Retrieve Object From Floor 4   Turning to Look Behind 3   Turn 360 Degrees 2   Placing Alternate Foot on Stool (4-6 inches) 3   Unsupported Tandem Stand (Demonstrate to Subject) 2   One Leg Stand 1   Total Score (A score of 45 or less has been correlated with an increased risk of falls)   Total Score (out of 56) 42     Alegria Balance Scale (BBS) Cutoff Scores: A score of < 45 /56 indicates an increased risk for falls.     The BBS is a measure of static and dynamic standing balance that has been validated in community dwelling elderly individuals and individuals who have Parkinson's Disease, MS, and those who are s/p CVA and TBI. The test is administered without an assistive device. Scores from the Alegria are used to determine the probability of falling based on the patient's previous history of falls and their test performance.     Minimal Detectable Change = 6.5   & Minimal Detectable Change (Parkinson's Disease) = 5 according to Franky & Pradipey 2008    Assessment (rationale for performing, application to patient s function & care plan): goals assessment , falls risk assessment  (Minutes billed as physical performance test)  15

## 2022-10-12 ENCOUNTER — HOSPITAL ENCOUNTER (OUTPATIENT)
Dept: PHYSICAL THERAPY | Facility: CLINIC | Age: 82
Setting detail: THERAPIES SERIES
Discharge: HOME | End: 2022-10-12
Attending: STUDENT IN AN ORGANIZED HEALTH CARE EDUCATION/TRAINING PROGRAM
Payer: MEDICARE

## 2022-10-12 PROCEDURE — 97110 THERAPEUTIC EXERCISES: CPT | Mod: GP | Performed by: PHYSICAL THERAPIST

## 2022-10-14 ENCOUNTER — INFUSION THERAPY VISIT (OUTPATIENT)
Dept: INFUSION THERAPY | Facility: CLINIC | Age: 82
End: 2022-10-14
Attending: INTERNAL MEDICINE
Payer: MEDICARE

## 2022-10-14 VITALS
WEIGHT: 147.4 LBS | HEART RATE: 76 BPM | RESPIRATION RATE: 16 BRPM | TEMPERATURE: 98.3 F | BODY MASS INDEX: 19.99 KG/M2 | DIASTOLIC BLOOD PRESSURE: 62 MMHG | SYSTOLIC BLOOD PRESSURE: 113 MMHG | OXYGEN SATURATION: 97 %

## 2022-10-14 DIAGNOSIS — D59.10 AUTOIMMUNE HEMOLYTIC ANEMIA (H): ICD-10-CM

## 2022-10-14 DIAGNOSIS — D59.19 OTHER AUTOIMMUNE HEMOLYTIC ANEMIA (H): ICD-10-CM

## 2022-10-14 DIAGNOSIS — D80.3 SELECTIVE DEFICIENCY OF IGG SUBCLASSES (H): Primary | ICD-10-CM

## 2022-10-14 DIAGNOSIS — D83.9 CVID (COMMON VARIABLE IMMUNODEFICIENCY) (H): ICD-10-CM

## 2022-10-14 PROCEDURE — 96375 TX/PRO/DX INJ NEW DRUG ADDON: CPT

## 2022-10-14 PROCEDURE — 250N000011 HC RX IP 250 OP 636: Performed by: INTERNAL MEDICINE

## 2022-10-14 PROCEDURE — 96366 THER/PROPH/DIAG IV INF ADDON: CPT

## 2022-10-14 PROCEDURE — 96365 THER/PROPH/DIAG IV INF INIT: CPT

## 2022-10-14 RX ORDER — METHYLPREDNISOLONE SODIUM SUCCINATE 125 MG/2ML
125 INJECTION, POWDER, LYOPHILIZED, FOR SOLUTION INTRAMUSCULAR; INTRAVENOUS
Status: CANCELLED
Start: 2022-10-23

## 2022-10-14 RX ORDER — EPINEPHRINE 1 MG/ML
0.3 INJECTION, SOLUTION INTRAMUSCULAR; SUBCUTANEOUS EVERY 5 MIN PRN
Status: CANCELLED | OUTPATIENT
Start: 2022-10-23

## 2022-10-14 RX ORDER — ALBUTEROL SULFATE 0.83 MG/ML
2.5 SOLUTION RESPIRATORY (INHALATION)
Status: CANCELLED | OUTPATIENT
Start: 2022-10-23

## 2022-10-14 RX ORDER — DIPHENHYDRAMINE HYDROCHLORIDE 50 MG/ML
50 INJECTION INTRAMUSCULAR; INTRAVENOUS
Status: CANCELLED
Start: 2022-10-23

## 2022-10-14 RX ORDER — HEPARIN SODIUM,PORCINE 10 UNIT/ML
5 VIAL (ML) INTRAVENOUS
Status: CANCELLED | OUTPATIENT
Start: 2022-10-23

## 2022-10-14 RX ORDER — MEPERIDINE HYDROCHLORIDE 25 MG/ML
25 INJECTION INTRAMUSCULAR; INTRAVENOUS; SUBCUTANEOUS EVERY 30 MIN PRN
Status: CANCELLED | OUTPATIENT
Start: 2022-10-23

## 2022-10-14 RX ORDER — HEPARIN SODIUM (PORCINE) LOCK FLUSH IV SOLN 100 UNIT/ML 100 UNIT/ML
5 SOLUTION INTRAVENOUS
Status: CANCELLED | OUTPATIENT
Start: 2022-10-23

## 2022-10-14 RX ORDER — DIPHENHYDRAMINE HCL 25 MG
50 CAPSULE ORAL ONCE
Status: CANCELLED
Start: 2022-10-23

## 2022-10-14 RX ORDER — NALOXONE HYDROCHLORIDE 0.4 MG/ML
0.2 INJECTION, SOLUTION INTRAMUSCULAR; INTRAVENOUS; SUBCUTANEOUS
Status: CANCELLED | OUTPATIENT
Start: 2022-10-23

## 2022-10-14 RX ORDER — ALBUTEROL SULFATE 90 UG/1
1-2 AEROSOL, METERED RESPIRATORY (INHALATION)
Status: CANCELLED
Start: 2022-10-23

## 2022-10-14 RX ORDER — ACETAMINOPHEN 325 MG/1
650 TABLET ORAL ONCE
Status: CANCELLED
Start: 2022-10-23

## 2022-10-14 RX ADMIN — HUMAN IMMUNOGLOBULIN G 40 G: 40 LIQUID INTRAVENOUS at 10:21

## 2022-10-14 RX ADMIN — HYDROCORTISONE SODIUM SUCCINATE 100 MG: 100 INJECTION, POWDER, FOR SOLUTION INTRAMUSCULAR; INTRAVENOUS at 10:15

## 2022-10-14 NOTE — PROGRESS NOTES
Infusion Nursing Note:  Tricia Sosa presents today for IVIG.    Patient seen by provider today: No   present during visit today: Not Applicable.    Note: Increased infusion by 0.5ml/kg/hr, max rate 4ml/kg/hr.    Patient declined tylenol and benadryl premeds.  Solucortef given.    Intravenous Access:  Peripheral IV placed.    Treatment Conditions:  Biological Infusion Checklist:  ~~~ NOTE: If the patient answers yes to any of the questions below, hold the infusion and contact ordering provider or on-call provider.    1. Have you recently had an elevated temperature, fever, chills, productive cough, coughing for 3 weeks or longer or hemoptysis, abnormal vital signs, night sweats,  chest pain or have you noticed a decrease in your appetite, unexplained weight loss or fatigue? No  2. Do you have any open wounds or new incisions? No  3. Do you have any recent or upcoming hospitalizations, surgeries or dental procedures? No  4. Do you currently have or recently have had any signs of illness or infection or are you on any antibiotics? No  5. Have you had any new, sudden or worsening abdominal pain? No  6. Have you or anyone in your household received a live vaccination in the past 4 weeks? Please note:  No live vaccines while on biologic/chemotherapy until 6 months after the last treatment.  Patient can receive the flu vaccine (shot only) and the pneumovax.  It is optimal for the patient to get these vaccines mid cycle, but they can be given at any time as long as it is not on the day of the infusion. No  7. Have you recently been diagnosed with any new nervous system diseases (ie. Multiple sclerosis, Guillain Goree, seizures, neurological changes) or cancer diagnosis? No  8. Are you on any form of radiation or chemotherapy? No  9. Are you pregnant or breast feeding or do you have plans of pregnancy in the future? No  10. Have you been having any signs of worsening depression or suicidal ideations?  (benlysta  only) No  11. Have there been any other new onset medical symptoms? No      Post Infusion Assessment:  Patient tolerated infusion without incident.  Blood return noted pre and post infusion.  Site patent and intact, free from redness, edema or discomfort.  No evidence of extravasations.  Access discontinued per protocol.     Discharge Plan:   AVS to patient via MYCHART.  Patient will return 11/14/22 for next appointment.   Patient discharged in stable condition accompanied by: self.  Departure Mode: Ambulatory.      Ally Adams RN

## 2022-10-18 ENCOUNTER — HOSPITAL ENCOUNTER (OUTPATIENT)
Dept: PHYSICAL THERAPY | Facility: CLINIC | Age: 82
Setting detail: THERAPIES SERIES
Discharge: HOME | End: 2022-10-18
Attending: STUDENT IN AN ORGANIZED HEALTH CARE EDUCATION/TRAINING PROGRAM
Payer: MEDICARE

## 2022-10-18 PROCEDURE — 97110 THERAPEUTIC EXERCISES: CPT | Mod: GP | Performed by: PHYSICAL THERAPIST

## 2022-10-18 PROCEDURE — 97750 PHYSICAL PERFORMANCE TEST: CPT | Mod: GP | Performed by: PHYSICAL THERAPIST

## 2022-10-20 NOTE — PROGRESS NOTES
10/18/22 1600   Signing Clinician's Name / Credentials   Signing clinician's name / credentials Genny Daugherty PT   Dynamic Gait Index (Lorenzo and Ayala [place], 1995)   Gait Level Surface 2   Change in Gait Speed 2   Gait and Horizontal Head Turns 1   Gait with Vertical Head Turns 2   Gait and Pivot Turns 2   Step Over Obstacle 1   Step Around Obstacles 2   Steps 2   Total Dynamic Gait Index Score  (A score of 19 or less has been correlated to an increased risk of falls in community dwelling older adults, patients with vestibular disorders, and patients with MS.)   Total Score (out of 24) 14     Dynamic Gait Index (DGI):The DGI is a measure of balance during gait that is reliable and valid for the elderly and individuals with Parkinson's disease, MS, vestibular disorders, or s/p stroke. Gait assistive device used: None    Scores ?19 /24 indicate an increased risk for falls according to Kylee et al 2000  Minimal Detectable Change = 2.9 in community dwelling elderly according to Sebastian et al 2011    Assessment (rationale for performing, application to patient s function & care plan): falls risk assessment, progress assessment   (Minutes billed as physical performance test)  10

## 2022-10-26 ENCOUNTER — HOSPITAL ENCOUNTER (OUTPATIENT)
Dept: PHYSICAL THERAPY | Facility: CLINIC | Age: 82
Setting detail: THERAPIES SERIES
Discharge: HOME | End: 2022-10-26
Attending: STUDENT IN AN ORGANIZED HEALTH CARE EDUCATION/TRAINING PROGRAM
Payer: MEDICARE

## 2022-10-26 PROCEDURE — 97110 THERAPEUTIC EXERCISES: CPT | Mod: GP | Performed by: PHYSICAL THERAPIST

## 2022-11-01 ENCOUNTER — OFFICE VISIT (OUTPATIENT)
Dept: ORTHOPEDICS | Facility: CLINIC | Age: 82
End: 2022-11-01
Payer: MEDICARE

## 2022-11-01 VITALS — DIASTOLIC BLOOD PRESSURE: 64 MMHG | SYSTOLIC BLOOD PRESSURE: 110 MMHG

## 2022-11-01 DIAGNOSIS — M17.11 PRIMARY OSTEOARTHRITIS OF RIGHT KNEE: Primary | ICD-10-CM

## 2022-11-01 PROCEDURE — 99212 OFFICE O/P EST SF 10 MIN: CPT | Performed by: ORTHOPAEDIC SURGERY

## 2022-11-01 NOTE — LETTER
11/1/2022         RE: Tricia Sosa  [id number]  [facility] [address]        Dear Colleague,    Thank you for referring your patient, Tricia Sosa, to the [facility] [place]. Please see a copy of my visit note below.    S: Patient is a 82 year old female seen today in follow up for right hip pain. She reports her right hip pain is very intermittent, she is unsure if an injection would be beneficial. She reports the buckling sensation in the right knee has improved. She reports no pain in the right knee. She notes after recent left cortisone injection her pain has improved, and is very minimal. She notes when the pain is present it is short lasting.   They were previously evaluated on 10/4/22.  They have tried the following therapies: exercises, left knee intra-articular cortisone injection (104/22), right knee intra-articular cortisone injection (9/6/22).        Buckling sensation each knee better.  R groin pain intermittent.       Patient Active Problem List   Diagnosis     Lymphocytic colitis     Acquired hypothyroidism     CVID (common variable immunodeficiency) (H)     B12 deficiency     CARDIOVASCULAR SCREENING; LDL GOAL LESS THAN 130     Other autoimmune hemolytic anemia (H)     Right-sided low back pain with right-sided sciatica, unspecified chronicity     Autoimmune hemolytic anemia (H)     Ulcerative colitis (H)     Bronchiectasis (H)     Mild obstructive sleep apnea     Selective deficiency of IgG subclasses (H)     Anemia, iron deficiency            Past Medical History:   Diagnosis Date     WARD (dyspnea on exertion)      External hemorrhoids without mention of complication 6/27/05     Hemolytic anemia (H)     autoimmune     Hypothyroidism      IgA deficiency (H)      Myopia of both eyes with astigmatism and presbyopia 8/16/2017     Other malaise and fatigue      Other severe protein-calorie malnutrition      Other vitreous opacities 12/19/2006     Thyroid  disease      Traumatic intracranial subdural hematoma (H) 2014    Hemorrhage Subdural Trauma Without LOC Active     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, and posterior vitreous detachment     Urinary tract infection, site not specified     Recurrent UTI's            Past Surgical History:   Procedure Laterality Date     COLONOSCOPY N/A 2016    Procedure: COLONOSCOPY;  Surgeon: Dontae Del Rio MD;  Location:  GI     ENT SURGERY      Thyroid lobectomy     EYE SURGERY Bilateral 2021    Cataract Surgery     HC CYSTOSCOPY,INSERT URETERAL STENT      Ureteral stent insertion     HC FLEX SIGMOIDOSCOPY W BIOPSY  00     HC LAPAROSCOPY, SURGICAL; CHOLECYSTECTOMY        THYROIDECTOMY       LIGATION OF HEMORRHOID(S)      Hemorrhoidectomy     UPPER GI ENDOSCOPY,BIOPSY  10/01/01     ZZC LIGATE FALLOPIAN TUBE       ZZ COLONOSCOPY W BIOPSY  00     ZZHC COLONOSCOPY W BIOPSY  10/8/03     ZZHC COLONOSCOPY W BIOPSY  05    REPEAT IN 5 YEARS            Social History     Tobacco Use     Smoking status: Never     Smokeless tobacco: Never   Substance Use Topics     Alcohol use: Yes     Alcohol/week: 0.0 standard drinks     Comment: 1 a day at most            Family History   Problem Relation Age of Onset     Colon Cancer Mother 90     Cerebrovascular Disease Father          at age 85     Dementia Brother      Prostate Cancer Brother      Thyroid Disease Brother      Dementia Brother      Thyroid Disease Brother      Pancreatic Cancer Brother      Breast Cancer Sister      Hyperlipidemia Sister      Depression Sister      Anxiety Disorder Sister      Thyroid Disease Sister      Breast Cancer Sister      Colon Cancer Niece      Other Cancer Niece         leukemia               Allergies   Allergen Reactions     Doxycycline             Current Outpatient Medications   Medication Sig Dispense Refill     clobetasol (TEMOVATE) 0.05 % external solution Apply  topically daily as needed (itch) 50 mL 0     cyanocobalamin (VITAMIN  B-12) 1000 MCG tablet   3     Flaxseed, Linseed, (FLAX SEEDS PO) Take by mouth daily       folic acid (FOLVITE) 1 MG tablet   3     hydrochlorothiazide (HYDRODIURIL) 12.5 MG tablet Take 1 tablet (12.5 mg) by mouth daily as needed (edema) 30 tablet 5     ketoconazole (NIZORAL) 2 % external shampoo Use every other day to scalp as needed 120 mL 11     levothyroxine (SYNTHROID/LEVOTHROID) 150 MCG tablet Take 1 tablet  by mouth daily 90 tablet 0     UNABLE TO FIND privigen inj every two months            Review Of Systems  Skin: negative  Eyes: negative  Ears/Nose/Throat: negative  Respiratory: No shortness of breath, dyspnea on exertion, cough, or hemoptysis    O: Physical Exam:  Patellofemoral crepitus  To palpation.  Not much pain with exam.  Each hip with supple IR/ER/abduction but some limit R hip greater than L to IR.  Some groin pain with L hip IR.  CMS intact to both lower extremities.    Images: L knee: IMPRESSION: No fracture. Mild degenerative changes in the medial and  lateral compartments. Advanced degenerative changes in the  patellofemoral compartment. Small loose body in the suprapatellar  recess. Osteopenia.       IMPRESSION:  1.  Right knee degenerative arthrosis. Advanced degenerative changes  in the patellofemoral compartment, with complete joint space  narrowing, subchondral sclerosis, and marginal osteophytosis. Mild  degenerative changes in the medial and lateral compartments.  2.  No fracture or joint malalignment.  3.  No significant joint effusion.                                                                     IMPRESSION: No fracture. Moderate degenerative changes in the right  hip. Mild degenerative changes in the left hip. Osteopenia.    A:  OA both knees and R hip    P:  Patient feels she is better over all and current symptoms don't warrant intervention.  Discussed repeat steroid injection vs viscosupplementation if  indicated.  See back PRN.           In addition to the above assessment and plan each active problem on Tricia's problem list was evaluated today. This included the questioning of Tricia for any medication problems. We will continue the current treatment plan for these active problems except as noted.      Again, thank you for allowing me to participate in the care of your patient.        Sincerely,        DENIA GAO MD

## 2022-11-01 NOTE — PROGRESS NOTES
S: Patient is a 82 year old female seen today in follow up for right hip pain. She reports her right hip pain is very intermittent, she is unsure if an injection would be beneficial. She reports the buckling sensation in the right knee has improved. She reports no pain in the right knee. She notes after recent left cortisone injection her pain has improved, and is very minimal. She notes when the pain is present it is short lasting.   They were previously evaluated on 10/4/22.  They have tried the following therapies: exercises, left knee intra-articular cortisone injection (104/22), right knee intra-articular cortisone injection (9/6/22).        Buckling sensation each knee better.  R groin pain intermittent.       Patient Active Problem List   Diagnosis     Lymphocytic colitis     Acquired hypothyroidism     CVID (common variable immunodeficiency) (H)     B12 deficiency     CARDIOVASCULAR SCREENING; LDL GOAL LESS THAN 130     Other autoimmune hemolytic anemia (H)     Right-sided low back pain with right-sided sciatica, unspecified chronicity     Autoimmune hemolytic anemia (H)     Ulcerative colitis (H)     Bronchiectasis (H)     Mild obstructive sleep apnea     Selective deficiency of IgG subclasses (H)     Anemia, iron deficiency            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, and posterior vitreous detachment     Urinary tract infection, site not  specified     Recurrent UTI's            Past Surgical History:   Procedure Laterality Date     COLONOSCOPY N/A 2016    Procedure: COLONOSCOPY;  Surgeon: Dontae Del Rio MD;  Location:  GI     ENT SURGERY      Thyroid lobectomy     EYE SURGERY Bilateral 2021    Cataract Surgery     HC CYSTOSCOPY,INSERT URETERAL STENT      Ureteral stent insertion     HC FLEX SIGMOIDOSCOPY W BIOPSY  00      LAPAROSCOPY, SURGICAL; CHOLECYSTECTOMY        THYROIDECTOMY       LIGATION OF HEMORRHOID(S)      Hemorrhoidectomy     UPPER GI ENDOSCOPY,BIOPSY  10/01/01     ZZ LIGATE FALLOPIAN TUBE       ZZ COLONOSCOPY W BIOPSY  00     ZZ COLONOSCOPY W BIOPSY  10/8/03     ZZ COLONOSCOPY W BIOPSY  05    REPEAT IN 5 YEARS            Social History     Tobacco Use     Smoking status: Never     Smokeless tobacco: Never   Substance Use Topics     Alcohol use: Yes     Alcohol/week: 0.0 standard drinks     Comment: 1 a day at most            Family History   Problem Relation Age of Onset     Colon Cancer Mother 90     Cerebrovascular Disease Father          at age 85     Dementia Brother      Prostate Cancer Brother      Thyroid Disease Brother      Dementia Brother      Thyroid Disease Brother      Pancreatic Cancer Brother      Breast Cancer Sister      Hyperlipidemia Sister      Depression Sister      Anxiety Disorder Sister      Thyroid Disease Sister      Breast Cancer Sister      Colon Cancer Niece      Other Cancer Niece         leukemia               Allergies   Allergen Reactions     Doxycycline             Current Outpatient Medications   Medication Sig Dispense Refill     clobetasol (TEMOVATE) 0.05 % external solution Apply topically daily as needed (itch) 50 mL 0     cyanocobalamin (VITAMIN  B-12) 1000 MCG tablet   3     Flaxseed, Linseed, (FLAX SEEDS PO) Take by mouth daily       folic acid (FOLVITE) 1 MG tablet   3     hydrochlorothiazide (HYDRODIURIL) 12.5 MG tablet Take 1 tablet  (12.5 mg) by mouth daily as needed (edema) 30 tablet 5     ketoconazole (NIZORAL) 2 % external shampoo Use every other day to scalp as needed 120 mL 11     levothyroxine (SYNTHROID/LEVOTHROID) 150 MCG tablet Take 1 tablet  by mouth daily 90 tablet 0     UNABLE TO FIND privigen inj every two months            Review Of Systems  Skin: negative  Eyes: negative  Ears/Nose/Throat: negative  Respiratory: No shortness of breath, dyspnea on exertion, cough, or hemoptysis    O: Physical Exam:  Patellofemoral crepitus  To palpation.  Not much pain with exam.  Each hip with supple IR/ER/abduction but some limit R hip greater than L to IR.  Some groin pain with L hip IR.  CMS intact to both lower extremities.    Images: L knee: IMPRESSION: No fracture. Mild degenerative changes in the medial and  lateral compartments. Advanced degenerative changes in the  patellofemoral compartment. Small loose body in the suprapatellar  recess. Osteopenia.       IMPRESSION:  1.  Right knee degenerative arthrosis. Advanced degenerative changes  in the patellofemoral compartment, with complete joint space  narrowing, subchondral sclerosis, and marginal osteophytosis. Mild  degenerative changes in the medial and lateral compartments.  2.  No fracture or joint malalignment.  3.  No significant joint effusion.                                                                     IMPRESSION: No fracture. Moderate degenerative changes in the right  hip. Mild degenerative changes in the left hip. Osteopenia.    A:  OA both knees and R hip    P:  Patient feels she is better over all and current symptoms don't warrant intervention.  Discussed repeat steroid injection vs viscosupplementation if indicated.  See back PRN.           In addition to the above assessment and plan each active problem on Tricia's problem list was evaluated today. This included the questioning of Tricia for any medication problems. We will continue the current treatment plan for  these active problems except as noted.

## 2022-11-03 ENCOUNTER — TELEPHONE (OUTPATIENT)
Dept: INTERNAL MEDICINE | Facility: CLINIC | Age: 82
End: 2022-11-03

## 2022-11-03 DIAGNOSIS — H81.10 BENIGN PAROXYSMAL POSITIONAL VERTIGO, UNSPECIFIED LATERALITY: Primary | ICD-10-CM

## 2022-11-03 NOTE — TELEPHONE ENCOUNTER
Patient calls c/o terrible vertigo that began today.  She is asking for a ref to Occupational Therapy. She says she is using 2 canes to walk around.   She said she did call the facility where she was referred to before on [address] but they are booked til Dec. Is there another place to be referred to>     pls call to advise [phone]

## 2022-11-04 ENCOUNTER — HOSPITAL ENCOUNTER (OUTPATIENT)
Dept: PHYSICAL THERAPY | Facility: CLINIC | Age: 82
Setting detail: THERAPIES SERIES
Discharge: HOME | End: 2022-11-04
Attending: INTERNAL MEDICINE
Payer: MEDICARE

## 2022-11-04 PROCEDURE — 97164 PT RE-EVAL EST PLAN CARE: CPT | Mod: GP | Performed by: PHYSICAL THERAPIST

## 2022-11-04 PROCEDURE — 95992 CANALITH REPOSITIONING PROC: CPT | Mod: GP | Performed by: PHYSICAL THERAPIST

## 2022-11-04 NOTE — PROGRESS NOTES
[facility]    OUTPATIENT PHYSICAL THERAPY  PLAN OF TREATMENT FOR OUTPATIENT REHABILITATION AND PROGRESS NOTE           Patient's Last Name, First Name, Tricia Jc Date of Birth  1940   Provider's Name  [facility] Medical Record No.  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   Target Date 02/01/23   Date Met      Progress (detail required for progress note): 8/9 - in progress.  modifications and progressions continue.     Goal Identifier Gait Tolerance   Goal Description Tricia will be able to walk 300 feet with her cane to demonstrate increased tolerance for community mobility for attending MD appointments and shopping tasks.   Target Date 02/01/23   Date Met      Progress (detail required for progress note): 8/9 - not walking very far, gets tired, can't stand upright very long.     Goal Identifier Position changes   Goal Description Patient will deny dizziness with change of body position for independent bed mobility and transfers for return to daily activities without limitation.   Target Date 02/01/23   Date Met      Progress (detail required for progress note): 11/4/22: dizziness with return to sit     Goal Identifier Griffin   Goal  Description Maandrew will be able to score 46/56 or greater on the Griffin to demonstrate low falls risk with daily activities.   Target Date 02/01/23   Date Met      Progress (detail required for progress note): 8/9 - not able to test today d/t time.  will test at next session.     Goal Identifier DHI   Goal Description Patient will complete the DHI with a score of <30/100 to demonstrate decreased perception of handicap and improved quality of life.   Target Date 02/01/23   Date Met      Progress (detail required for progress note): 11/4/22: 64/100       Beginning/End Dates of Progress Note Reporting Period:  9/23/22 to 11/4/22    Progress Toward Goals:   Re certification performed today due to onset of dizziness with s/s of BPPV. Progress toward other goals not assessed due to time limitations, goals and POC modified to include treatment for dizziness.     Client Self (Subjective) Report for Progress Note Reporting Period: See re-eval    Objective Measurements:      DHI: 64/100             I CERTIFY THE NEED FOR THESE SERVICES FURNISHED UNDER        THIS PLAN OF TREATMENT AND WHILE UNDER MY CARE     (Physician co-signature of this document indicates review and certification of the therapy plan).                Referring Provider: CORDELIA DOCKERY MD Kelly Hoyt, PT

## 2022-11-04 NOTE — TELEPHONE ENCOUNTER
[facility] calling because they need a PHYSICAL THERAPY order for vertigo to evaluate and treatment with diagnosis of bppv they can see her today at 10:15.    Serina [phone].

## 2022-11-04 NOTE — TELEPHONE ENCOUNTER
Patient calls and is worse today and she is asking for a response today.  Advise patient Dr. Church is out and will send to a covering Provider.

## 2022-11-08 ENCOUNTER — HOSPITAL ENCOUNTER (OUTPATIENT)
Dept: PHYSICAL THERAPY | Facility: CLINIC | Age: 82
Setting detail: THERAPIES SERIES
Discharge: HOME | End: 2022-11-08
Attending: INTERNAL MEDICINE
Payer: MEDICARE

## 2022-11-08 PROCEDURE — 95992 CANALITH REPOSITIONING PROC: CPT | Mod: GP | Performed by: PHYSICAL THERAPIST

## 2022-11-10 NOTE — PROGRESS NOTES
Tricia is a 82 year old who is being evaluated via a billable telephone visit.  Currently in [place].    What phone number would you like to be contacted at? [phone]  How would you like to obtain your AVS? Dax  Phone call duration: 14 minutes  JIM Ayala        [facility]    Hematology/Oncology Established Patient Follow-up Note    Treatment Summary:      Today's Date: 11/11/22    Reason for Follow-up: Hemolytic anemia-autoimmune; IgA deficiency     HISTORY OF PRESENT ILLNESS: Tricia Sosa is a 82 year old female who presents with autoimmune hemolytic anemia and IgA deficiency.  She previously saw Dr. Matos and then Dr. Summers.  She was previously seen at [facility].     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  She was very sick at the time and had severe diarrhea.  She lost about 40 pounds in weight.  The workup revealed that she had markedly diminished CD4 counts of 48 and was diagnosed with CMV colitis.  Since then she has been followed in hematology clinic at [place] until recently.  She was noted to have anemia which was fairly long-standing.  She was initially referred for anemia in 2004 and also had a bone marrow biopsy done at that time which revealed hypercellular bone marrow with 70-80% cellularity and normal trilineage hematopoiesis and no morphologic features of MDS or lymphoproliferation.  Her anemia was attributed to her chronic underlying disease.  At the next evaluation in 2002 on 4 aggressive anemia there was no evidence of hemolysis, iron deficiency, B12 or folate deficiency.  Bone marrow biopsy was not pursued.  In summer of 2015 she had progressive fatigue, dyspnea on exertion and worsening anemia with a hemoglobin now of 9.  Workup at this time did suggest a hemolytic anemia with elevated LDH at 709, undetectable haptoglobin and elevated unconjugated bilirubin at 3.3.  Monospecific MARIKA was positive for both IgG and  complement.  Cold agglutinin screen was positive with very low titer 1:64.  She was started on prednisone 60 mg daily with supplementation of iron folate and B12 starting 7/31/15.  Her prednisone has been slowly tapered and was discontinued off in January 2016.  She was last followed at [facility] on March 10, 2016 when she had stable hemoglobin.     She was followed by Dr. Matos and Dr. Summers.  Due to relapse of hemolytic anemia, she underwent another course of prednisone that has since been tapered off and rituximab x 4 weekly doses completed in 9/19/19.     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  She started weekly Rituxan on 7/31/20 x 4 doses, completed on 8/21/20.  She tapered off of prednisone in October 2020.     Due to relapse of her AIHA, she started prednisone again on 6/8/21 at 60 mg daily with slow taper and finished taper in August 2021.     Due to relapse of AIHA again, she started prednisone again on 11/2/21 at 70 mg daily with slow taper.  She also completed weekly Rituxan again x 4 doses 12/6/21-12/29/21.    Venofer 5/5-5/19/22.      INTERIM HISTORY:  Patient continues to do well.  She last received rituximab on 9/20/22. She continues now on maintenance every 2 months.     IVIG was last given on 10/14//22.     She has been working with physical therapy. She has had vertigo from time to time; she states this has been chronic since the 2010's. No weight loss, fatigue, LAD, night sweats, fever. No gross evidence of bleed.     She states her blood pressure has been varying 110s to 130s systolic. She has not used hydrochlorothiazide in many months. No headache, chest pain, shortness of breath.        REVIEW OF SYSTEMS:   A 14 point ROS was reviewed with pertinent positives and negatives in the HPI.       HOME MEDICATIONS:  Current Outpatient Medications   Medication Sig Dispense Refill     clobetasol (TEMOVATE) 0.05 % external solution  Apply topically daily as needed (itch) 50 mL 0     cyanocobalamin (VITAMIN  B-12) 1000 MCG tablet   3     Flaxseed, Linseed, (FLAX SEEDS PO) Take by mouth daily       folic acid (FOLVITE) 1 MG tablet   3     hydrochlorothiazide (HYDRODIURIL) 12.5 MG tablet Take 1 tablet (12.5 mg) by mouth daily as needed (edema) 30 tablet 5     ketoconazole (NIZORAL) 2 % external shampoo Use every other day to scalp as needed 120 mL 11     levothyroxine (SYNTHROID/LEVOTHROID) 150 MCG tablet Take 1 tablet  by mouth daily 90 tablet 0         ALLERGIES:  Allergies   Allergen Reactions     Doxycycline          PAST MEDICAL HISTORY:  Past Medical History:   Diagnosis Date     WARD (dyspnea on exertion)      External hemorrhoids without mention of complication 6/27/05     Hemolytic anemia (H)     autoimmune     Hypothyroidism      IgA deficiency (H)      Myopia of both eyes with astigmatism and presbyopia 8/16/2017     Other malaise and fatigue      Other severe protein-calorie malnutrition      Other vitreous opacities 12/19/2006     Thyroid disease      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    Hemorrhage Subdural Trauma Without LOC Active     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, and posterior vitreous detachment     Urinary tract infection, site not specified     Recurrent UTI's         PAST SURGICAL HISTORY:  Past Surgical History:   Procedure Laterality Date     COLONOSCOPY N/A 4/26/2016    Procedure: COLONOSCOPY;  Surgeon: Dontae Del Rio MD;  Location:  GI     ENT SURGERY  1985    Thyroid lobectomy     EYE SURGERY Bilateral 04/20/2021    Cataract Surgery      CYSTOSCOPY,INSERT URETERAL STENT      Ureteral stent insertion      FLEX SIGMOIDOSCOPY W BIOPSY  5/30/00      LAPAROSCOPY, SURGICAL; CHOLECYSTECTOMY  1992      THYROIDECTOMY       LIGATION OF HEMORRHOID(S)      Hemorrhoidectomy     UPPER GI ENDOSCOPY,BIOPSY  10/01/01     [facility] LIGATE FALLOPIAN TUBE       [facility]  COLONOSCOPY W BIOPSY  00     [facility] COLONOSCOPY W BIOPSY  10/8/03     [facility] COLONOSCOPY W BIOPSY  05    REPEAT IN 5 YEARS         SOCIAL HISTORY:  Social History     Socioeconomic History     Marital status:      Spouse name: Not on file     Number of children: Not on file     Years of education: Not on file     Highest education level: Not on file   Occupational History     Not on file   Tobacco Use     Smoking status: Never     Smokeless tobacco: Never   Substance and Sexual Activity     Alcohol use: Yes     Alcohol/week: 0.0 standard drinks     Comment: 1 a day at most     Drug use: No     Sexual activity: Not Currently     Partners: Male   Other Topics Concern     Parent/sibling w/ CABG, MI or angioplasty before 65F 55M? No   Social History Narrative     Not on file     Social Determinants of Health     Financial Resource Strain: Not on file   Food Insecurity: Not on file   Transportation Needs: Not on file   Physical Activity: Not on file   Stress: Not on file   Social Connections: Not on file   Intimate Partner Violence: Not At Risk     Fear of Current or Ex-Partner: No     Emotionally Abused: No     Physically Abused: No     Sexually Abused: No   Housing Stability: Not on file         FAMILY HISTORY:  Family History   Problem Relation Age of Onset     Colon Cancer Mother 90     Cerebrovascular Disease Father          at age 85     Dementia Brother      Prostate Cancer Brother      Thyroid Disease Brother      Dementia Brother      Thyroid Disease Brother      Pancreatic Cancer Brother      Breast Cancer Sister      Hyperlipidemia Sister      Depression Sister      Anxiety Disorder Sister      Thyroid Disease Sister      Breast Cancer Sister      Colon Cancer Niece      Other Cancer Niece         leukemia         PHYSICAL EXAM:  Telephone visit.     LABS:   Latest Reference Range & Units 22 11:03   Sodium 136 - 145 mmol/L 137   Potassium 3.4 - 5.3 mmol/L 4.5   Chloride 98 - 107 mmol/L  102   Carbon Dioxide (CO2) 22 - 29 mmol/L 29   Urea Nitrogen 8.0 - 23.0 mg/dL 13.1   Creatinine 0.51 - 0.95 mg/dL 0.61   GFR Estimate >60 mL/min/1.73m2 89   Calcium 8.8 - 10.2 mg/dL 8.8   Anion Gap 7 - 15 mmol/L 6 (L)   Albumin 3.5 - 5.2 g/dL 4.2   Protein Total 6.4 - 8.3 g/dL 6.0 (L)   Alkaline Phosphatase 35 - 104 U/L 139 (H)   ALT 10 - 35 U/L 20   AST 10 - 35 U/L 35   Bilirubin Total <=1.2 mg/dL 0.7   Glucose 70 - 99 mg/dL 96   Lactate Dehydrogenase 0 - 250 U/L 232   WBC 4.0 - 11.0 10e3/uL 5.6   Hemoglobin 11.7 - 15.7 g/dL 12.2   Hematocrit 35.0 - 47.0 % 35.1   Platelet Count 150 - 450 10e3/uL 130 (L)   RBC Count 3.80 - 5.20 10e6/uL 3.44 (L)   MCV 78 - 100 fL 102 (H)   MCH 26.5 - 33.0 pg 35.5 (H)   MCHC 31.5 - 36.5 g/dL 34.8   RDW 10.0 - 15.0 % 13.9   % Neutrophils % 46   % Lymphocytes % 43   % Monocytes % 11   % Eosinophils % 0   % Basophils % 0   Absolute Basophils 0.0 - 0.2 10e3/uL 0.0   Absolute Eosinophils 0.0 - 0.7 10e3/uL 0.0   Absolute Immature Granulocytes <=0.4 10e3/uL 0.0   Absolute Lymphocytes 0.8 - 5.3 10e3/uL 2.4   Absolute Monocytes 0.0 - 1.3 10e3/uL 0.6   % Immature Granulocytes % 0   Absolute Neutrophils 1.6 - 8.3 10e3/uL 2.6   Absolute NRBCs 10e3/uL 0.0   NRBCs per 100 WBC <1 /100 0   % Retic 0.5 - 2.0 % 1.9   Absolute Retic 0.025 - 0.095 10e6/uL 0.067       IMAGING:  BILATERAL FULL FIELD DIGITAL SCREENING MAMMOGRAM WITH TOMOSYNTHESIS     Performed on: 6/23/22     IMPRESSION: ACR BI-RADS Category 1: Negative     RECOMMENDED FOLLOW-UP: Annual routine screening mammogram      ASSESSMENT/PLAN:  Tricia Sosa is a 82 year old female with:        1. 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 Common variable immunodeficiency disorder  4. Splenomegaly           1) Autoimmune hemolytic anemia: She had relapse in July 2020, and started on prednisone, as well as weekly rituximab infusions x 4, completed on  8/21/20.  Since then, she has been off and on prednisone and rituximab, getting response each time, but relapses are getting more frequent.       She does not want to consider splenectomy yet.      She is completing another 4 doses of weekly rituxumab in May 2022, and then will go on maintenance rituximab to try to maintain her hemoglobin longer.  She wants to try to avoid having to go back on steroids if possible.     -She has tapered off of prednisone again as of early March 2022.    -Hemoglobin improved to 12.9.  -Monitor CBC monthly.  -She takes Bactrim for prophylaxis when she is on prednisone - currently off.  -Continue maintenance rituximab every 2 months.     2) CVID:   -Continue IVIG.  She gets it, if IgG is <600.  It's been about every other month.    -IgG check and IVIG every 4 weeks if meets parameters  -She saw immunology in [place] who recommended IVIG monthly, but our financial team continues to check and says that it is only covered with IgG is <600.      3) Iron-deficiency anemia  -s/p Venofer in May 2022.  -Recheck in 2 months.     4) Thrombocytopenia  -Waxing/waning.  -Monitor.    5) Hypertension  -Patient states her BP has been ranging 110s to 130's. I have asked her to keep a BP log at home. She will follow up with PCP in December 2022.   -She has not used hydrochlorothiazide in many months.     6) Okay for rituximab on 11/14/22. Follow up with FELIZ in 2 months with next date of rituximab. Alternate follow up between FELIZ and myself every 2 months with date of rituximab. Continue once monthly labs. IVIG is given essentially every other month at this point.    Leandra Ryder, DO  Hematology/Oncology  [facility]

## 2022-11-11 ENCOUNTER — LAB (OUTPATIENT)
Dept: ONCOLOGY | Facility: CLINIC | Age: 82
End: 2022-11-11
Attending: INTERNAL MEDICINE
Payer: MEDICARE

## 2022-11-11 DIAGNOSIS — D83.9 CVID (COMMON VARIABLE IMMUNODEFICIENCY) (H): ICD-10-CM

## 2022-11-11 DIAGNOSIS — D59.10 AUTOIMMUNE HEMOLYTIC ANEMIA (H): ICD-10-CM

## 2022-11-11 DIAGNOSIS — D59.19 OTHER AUTOIMMUNE HEMOLYTIC ANEMIA (H): ICD-10-CM

## 2022-11-11 DIAGNOSIS — D59.10 AUTOIMMUNE HEMOLYTIC ANEMIA (H): Primary | ICD-10-CM

## 2022-11-11 LAB
ALBUMIN SERPL BCG-MCNC: 4.2 G/DL (ref 3.5–5.2)
ALP SERPL-CCNC: 139 U/L (ref 35–104)
ALT SERPL W P-5'-P-CCNC: 20 U/L (ref 10–35)
ANION GAP SERPL CALCULATED.3IONS-SCNC: 6 MMOL/L (ref 7–15)
AST SERPL W P-5'-P-CCNC: 35 U/L (ref 10–35)
BASOPHILS # BLD AUTO: 0 10E3/UL (ref 0–0.2)
BASOPHILS NFR BLD AUTO: 0 %
BILIRUB SERPL-MCNC: 0.7 MG/DL
BUN SERPL-MCNC: 13.1 MG/DL (ref 8–23)
CALCIUM SERPL-MCNC: 8.8 MG/DL (ref 8.8–10.2)
CHLORIDE SERPL-SCNC: 102 MMOL/L (ref 98–107)
CREAT SERPL-MCNC: 0.61 MG/DL (ref 0.51–0.95)
DEPRECATED HCO3 PLAS-SCNC: 29 MMOL/L (ref 22–29)
EOSINOPHIL # BLD AUTO: 0 10E3/UL (ref 0–0.7)
EOSINOPHIL NFR BLD AUTO: 0 %
ERYTHROCYTE [DISTWIDTH] IN BLOOD BY AUTOMATED COUNT: 13.9 % (ref 10–15)
GFR SERPL CREATININE-BSD FRML MDRD: 89 ML/MIN/1.73M2
GLUCOSE SERPL-MCNC: 96 MG/DL (ref 70–99)
HCT VFR BLD AUTO: 35.1 % (ref 35–47)
HGB BLD-MCNC: 12.2 G/DL (ref 11.7–15.7)
IMM GRANULOCYTES # BLD: 0 10E3/UL
IMM GRANULOCYTES NFR BLD: 0 %
LDH SERPL L TO P-CCNC: 232 U/L (ref 0–250)
LYMPHOCYTES # BLD AUTO: 2.4 10E3/UL (ref 0.8–5.3)
LYMPHOCYTES NFR BLD AUTO: 43 %
MCH RBC QN AUTO: 35.5 PG (ref 26.5–33)
MCHC RBC AUTO-ENTMCNC: 34.8 G/DL (ref 31.5–36.5)
MCV RBC AUTO: 102 FL (ref 78–100)
MONOCYTES # BLD AUTO: 0.6 10E3/UL (ref 0–1.3)
MONOCYTES NFR BLD AUTO: 11 %
NEUTROPHILS # BLD AUTO: 2.6 10E3/UL (ref 1.6–8.3)
NEUTROPHILS NFR BLD AUTO: 46 %
NRBC # BLD AUTO: 0 10E3/UL
NRBC BLD AUTO-RTO: 0 /100
PLATELET # BLD AUTO: 130 10E3/UL (ref 150–450)
POTASSIUM SERPL-SCNC: 4.5 MMOL/L (ref 3.4–5.3)
PROT SERPL-MCNC: 6 G/DL (ref 6.4–8.3)
RBC # BLD AUTO: 3.44 10E6/UL (ref 3.8–5.2)
RETICS # AUTO: 0.07 10E6/UL (ref 0.03–0.1)
RETICS/RBC NFR AUTO: 1.9 % (ref 0.5–2)
SODIUM SERPL-SCNC: 137 MMOL/L (ref 136–145)
WBC # BLD AUTO: 5.6 10E3/UL (ref 4–11)

## 2022-11-11 PROCEDURE — 82040 ASSAY OF SERUM ALBUMIN: CPT | Performed by: INTERNAL MEDICINE

## 2022-11-11 PROCEDURE — 99442 PR PHYSICIAN TELEPHONE EVALUATION 11-20 MIN: CPT | Mod: 95 | Performed by: INTERNAL MEDICINE

## 2022-11-11 PROCEDURE — 82784 ASSAY IGA/IGD/IGG/IGM EACH: CPT | Performed by: INTERNAL MEDICINE

## 2022-11-11 PROCEDURE — 80053 COMPREHEN METABOLIC PANEL: CPT | Performed by: INTERNAL MEDICINE

## 2022-11-11 PROCEDURE — 36415 COLL VENOUS BLD VENIPUNCTURE: CPT

## 2022-11-11 PROCEDURE — 85025 COMPLETE CBC W/AUTO DIFF WBC: CPT | Performed by: INTERNAL MEDICINE

## 2022-11-11 PROCEDURE — 83615 LACTATE (LD) (LDH) ENZYME: CPT | Performed by: INTERNAL MEDICINE

## 2022-11-11 PROCEDURE — 83010 ASSAY OF HAPTOGLOBIN QUANT: CPT | Performed by: INTERNAL MEDICINE

## 2022-11-11 PROCEDURE — 85045 AUTOMATED RETICULOCYTE COUNT: CPT | Performed by: INTERNAL MEDICINE

## 2022-11-11 RX ORDER — DIPHENHYDRAMINE HCL 25 MG
25 CAPSULE ORAL ONCE
Status: CANCELLED
Start: 2023-01-09

## 2022-11-11 RX ORDER — ALBUTEROL SULFATE 90 UG/1
1-2 AEROSOL, METERED RESPIRATORY (INHALATION)
Status: CANCELLED
Start: 2023-01-09

## 2022-11-11 RX ORDER — HEPARIN SODIUM (PORCINE) LOCK FLUSH IV SOLN 100 UNIT/ML 100 UNIT/ML
5 SOLUTION INTRAVENOUS
Status: CANCELLED | OUTPATIENT
Start: 2023-01-09

## 2022-11-11 RX ORDER — MEPERIDINE HYDROCHLORIDE 25 MG/ML
25 INJECTION INTRAMUSCULAR; INTRAVENOUS; SUBCUTANEOUS EVERY 30 MIN PRN
Status: CANCELLED | OUTPATIENT
Start: 2023-01-09

## 2022-11-11 RX ORDER — ACETAMINOPHEN 325 MG/1
650 TABLET ORAL ONCE
Status: CANCELLED
Start: 2023-01-09

## 2022-11-11 RX ORDER — HEPARIN SODIUM,PORCINE 10 UNIT/ML
5 VIAL (ML) INTRAVENOUS
Status: CANCELLED | OUTPATIENT
Start: 2023-01-09

## 2022-11-11 RX ORDER — ALBUTEROL SULFATE 0.83 MG/ML
2.5 SOLUTION RESPIRATORY (INHALATION)
Status: CANCELLED | OUTPATIENT
Start: 2022-11-14

## 2022-11-11 RX ORDER — EPINEPHRINE 1 MG/ML
0.3 INJECTION, SOLUTION INTRAMUSCULAR; SUBCUTANEOUS EVERY 5 MIN PRN
Status: CANCELLED | OUTPATIENT
Start: 2023-01-09

## 2022-11-11 RX ORDER — EPINEPHRINE 1 MG/ML
0.3 INJECTION, SOLUTION INTRAMUSCULAR; SUBCUTANEOUS EVERY 5 MIN PRN
Status: CANCELLED | OUTPATIENT
Start: 2022-11-14

## 2022-11-11 RX ORDER — NALOXONE HYDROCHLORIDE 0.4 MG/ML
0.2 INJECTION, SOLUTION INTRAMUSCULAR; INTRAVENOUS; SUBCUTANEOUS
Status: CANCELLED | OUTPATIENT
Start: 2022-11-14

## 2022-11-11 RX ORDER — MEPERIDINE HYDROCHLORIDE 25 MG/ML
25 INJECTION INTRAMUSCULAR; INTRAVENOUS; SUBCUTANEOUS EVERY 30 MIN PRN
Status: CANCELLED | OUTPATIENT
Start: 2022-11-14

## 2022-11-11 RX ORDER — ALBUTEROL SULFATE 90 UG/1
1-2 AEROSOL, METERED RESPIRATORY (INHALATION)
Status: CANCELLED
Start: 2022-11-14

## 2022-11-11 RX ORDER — DIPHENHYDRAMINE HYDROCHLORIDE 50 MG/ML
50 INJECTION INTRAMUSCULAR; INTRAVENOUS
Status: CANCELLED
Start: 2022-11-14

## 2022-11-11 RX ORDER — NALOXONE HYDROCHLORIDE 0.4 MG/ML
0.2 INJECTION, SOLUTION INTRAMUSCULAR; INTRAVENOUS; SUBCUTANEOUS
Status: CANCELLED | OUTPATIENT
Start: 2023-01-09

## 2022-11-11 RX ORDER — HEPARIN SODIUM (PORCINE) LOCK FLUSH IV SOLN 100 UNIT/ML 100 UNIT/ML
5 SOLUTION INTRAVENOUS
Status: CANCELLED | OUTPATIENT
Start: 2022-11-14

## 2022-11-11 RX ORDER — MEPERIDINE HYDROCHLORIDE 25 MG/ML
25 INJECTION INTRAMUSCULAR; INTRAVENOUS; SUBCUTANEOUS
Status: CANCELLED
Start: 2022-11-14

## 2022-11-11 RX ORDER — METHYLPREDNISOLONE SODIUM SUCCINATE 125 MG/2ML
125 INJECTION, POWDER, LYOPHILIZED, FOR SOLUTION INTRAMUSCULAR; INTRAVENOUS
Status: CANCELLED
Start: 2023-01-09

## 2022-11-11 RX ORDER — ACETAMINOPHEN 325 MG/1
650 TABLET ORAL ONCE
Status: CANCELLED
Start: 2022-11-14

## 2022-11-11 RX ORDER — METHYLPREDNISOLONE SODIUM SUCCINATE 125 MG/2ML
125 INJECTION, POWDER, LYOPHILIZED, FOR SOLUTION INTRAMUSCULAR; INTRAVENOUS
Status: CANCELLED
Start: 2022-11-14

## 2022-11-11 RX ORDER — DIPHENHYDRAMINE HCL 25 MG
25 CAPSULE ORAL ONCE
Status: CANCELLED
Start: 2022-11-14

## 2022-11-11 RX ORDER — ALBUTEROL SULFATE 0.83 MG/ML
2.5 SOLUTION RESPIRATORY (INHALATION)
Status: CANCELLED | OUTPATIENT
Start: 2023-01-09

## 2022-11-11 RX ORDER — MEPERIDINE HYDROCHLORIDE 25 MG/ML
25 INJECTION INTRAMUSCULAR; INTRAVENOUS; SUBCUTANEOUS
Status: CANCELLED
Start: 2023-01-09

## 2022-11-11 RX ORDER — HEPARIN SODIUM,PORCINE 10 UNIT/ML
5 VIAL (ML) INTRAVENOUS
Status: CANCELLED | OUTPATIENT
Start: 2022-11-14

## 2022-11-11 RX ORDER — DIPHENHYDRAMINE HYDROCHLORIDE 50 MG/ML
50 INJECTION INTRAMUSCULAR; INTRAVENOUS
Status: CANCELLED
Start: 2023-01-09

## 2022-11-11 NOTE — LETTER
11/11/2022         RE: Tricia Sosa  [address]  [facility] [id number]        Dear Colleague,    Thank you for referring your patient, Tricia Sosa, to the [facility]. Please see a copy of my visit note below.    Tricia is a 82 year old who is being evaluated via a billable telephone visit.  Currently in [place].    What phone number would you like to be contacted at? [phone]  How would you like to obtain your AVS? [email or web address]  Phone call duration: 14 minutes  JIM Ayala        [facility]    Hematology/Oncology Established Patient Follow-up Note    Treatment Summary:      Today's Date: 11/11/22    Reason for Follow-up: Hemolytic anemia-autoimmune; IgA deficiency     HISTORY OF PRESENT ILLNESS: Tricia Sosa is a 82 year old female who presents with autoimmune hemolytic anemia and IgA deficiency.  She previously saw Dr. Matos and then Dr. Summers.  She was previously seen at [facility].     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  She was very sick at the time and had severe diarrhea.  She lost about 40 pounds in weight.  The workup revealed that she had markedly diminished CD4 counts of 48 and was diagnosed with CMV colitis.  Since then she has been followed in hematology clinic at [facility] until recently.  She was noted to have anemia which was fairly long-standing.  She was initially referred for anemia in 2004 and also had a bone marrow biopsy done at that time which revealed hypercellular bone marrow with 70-80% cellularity and normal trilineage hematopoiesis and no morphologic features of MDS or lymphoproliferation.  Her anemia was attributed to her chronic underlying disease.  At the next evaluation in 2002 on 4 aggressive anemia there was no evidence of hemolysis, iron deficiency, B12 or folate deficiency.  Bone marrow biopsy was not pursued.  In summer of 2015 she had progressive fatigue,  dyspnea on exertion and worsening anemia with a hemoglobin now of 9.  Workup at this time did suggest a hemolytic anemia with elevated LDH at 709, undetectable haptoglobin and elevated unconjugated bilirubin at 3.3.  Monospecific MARIKA was positive for both IgG and complement.  Cold agglutinin screen was positive with very low titer 1:64.  She was started on prednisone 60 mg daily with supplementation of iron folate and B12 starting 7/31/15.  Her prednisone has been slowly tapered and was discontinued off in January 2016.  She was last followed at [facility] on March 10, 2016 when she had stable hemoglobin.     She was followed by Dr. Matos and Dr. Summers.  Due to relapse of hemolytic anemia, she underwent another course of prednisone that has since been tapered off and rituximab x 4 weekly doses completed in 9/19/19.     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  She started weekly Rituxan on 7/31/20 x 4 doses, completed on 8/21/20.  She tapered off of prednisone in October 2020.     Due to relapse of her AIHA, she started prednisone again on 6/8/21 at 60 mg daily with slow taper and finished taper in August 2021.     Due to relapse of AIHA again, she started prednisone again on 11/2/21 at 70 mg daily with slow taper.  She also completed weekly Rituxan again x 4 doses 12/6/21-12/29/21.    Venofer 5/5-5/19/22.      INTERIM HISTORY:  Patient continues to do well.  She last received rituximab on 9/20/22. She continues now on maintenance every 2 months.     IVIG was last given on 10/14//22.     She has been working with physical therapy. She has had vertigo from time to time; she states this has been chronic since the 2010's. No weight loss, fatigue, LAD, night sweats, fever. No gross evidence of bleed.     She states her blood pressure has been varying 110s to 130s systolic. She has not used hydrochlorothiazide in many months. No headache, chest pain,  shortness of breath.        REVIEW OF SYSTEMS:   A 14 point ROS was reviewed with pertinent positives and negatives in the HPI.       HOME MEDICATIONS:  Current Outpatient Medications   Medication Sig Dispense Refill     clobetasol (TEMOVATE) 0.05 % external solution Apply topically daily as needed (itch) 50 mL 0     cyanocobalamin (VITAMIN  B-12) 1000 MCG tablet   3     Flaxseed, Linseed, (FLAX SEEDS PO) Take by mouth daily       folic acid (FOLVITE) 1 MG tablet   3     hydrochlorothiazide (HYDRODIURIL) 12.5 MG tablet Take 1 tablet (12.5 mg) by mouth daily as needed (edema) 30 tablet 5     ketoconazole (NIZORAL) 2 % external shampoo Use every other day to scalp as needed 120 mL 11     levothyroxine (SYNTHROID/LEVOTHROID) 150 MCG tablet Take 1 tablet  by mouth daily 90 tablet 0         ALLERGIES:  Allergies   Allergen Reactions     Doxycycline          PAST MEDICAL HISTORY:  Past Medical History:   Diagnosis Date     WARD (dyspnea on exertion)      External hemorrhoids without mention of complication 6/27/05     Hemolytic anemia (H)     autoimmune     Hypothyroidism      IgA deficiency (H)      Myopia of both eyes with astigmatism and presbyopia 8/16/2017     Other malaise and fatigue      Other severe protein-calorie malnutrition      Other vitreous opacities 12/19/2006     Thyroid disease      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    Hemorrhage Subdural Trauma Without LOC Active     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, and posterior vitreous detachment     Urinary tract infection, site not specified     Recurrent UTI's         PAST SURGICAL HISTORY:  Past Surgical History:   Procedure Laterality Date     COLONOSCOPY N/A 4/26/2016    Procedure: COLONOSCOPY;  Surgeon: Dontae Del Rio MD;  Location:  GI     ENT SURGERY  1985    Thyroid lobectomy     EYE SURGERY Bilateral 04/20/2021    Cataract Surgery     HC CYSTOSCOPY,INSERT URETERAL STENT      Ureteral stent  insertion     HC FLEX SIGMOIDOSCOPY W BIOPSY  00     HC LAPAROSCOPY, SURGICAL; CHOLECYSTECTOMY        THYROIDECTOMY       LIGATION OF HEMORRHOID(S)      Hemorrhoidectomy     UPPER GI ENDOSCOPY,BIOPSY  10/01/01     [facility] LIGATE FALLOPIAN TUBE       [facility] COLONOSCOPY W BIOPSY  00     [facility]  [facility]     [facility]  05    REPEAT IN 5 YEARS         SOCIAL HISTORY:  Social History     Socioeconomic History     Marital status:      Spouse name: Not on file     Number of children: Not on file     Years of education: Not on file     Highest education level: Not on file   Occupational History     Not on file   Tobacco Use     Smoking status: Never     Smokeless tobacco: Never   Substance and Sexual Activity     Alcohol use: Yes     Alcohol/week: 0.0 standard drinks     Comment: 1 a day at most     Drug use: No     Sexual activity: Not Currently     Partners: Male   Other Topics Concern     Parent/sibling w/ CABG, MI or angioplasty before 65F 55M? No   Social History Narrative     Not on file     Social Determinants of Health     Financial Resource Strain: Not on file   Food Insecurity: Not on file   Transportation Needs: Not on file   Physical Activity: Not on file   Stress: Not on file   Social Connections: Not on file   Intimate Partner Violence: Not At Risk     Fear of Current or Ex-Partner: No     Emotionally Abused: No     Physically Abused: No     Sexually Abused: No   Housing Stability: Not on file         FAMILY HISTORY:  Family History   Problem Relation Age of Onset     Colon Cancer Mother 90     Cerebrovascular Disease Father          at age 85     Dementia Brother      Prostate Cancer Brother      Thyroid Disease Brother      Dementia Brother      Thyroid Disease Brother      Pancreatic Cancer Brother      Breast Cancer Sister      Hyperlipidemia Sister      Depression Sister      Anxiety Disorder Sister      Thyroid Disease Sister      Breast Cancer  Sister      Colon Cancer Niece      Other Cancer Niece         leukemia         PHYSICAL EXAM:  Telephone visit.     LABS:   Latest Reference Range & Units 11/11/22 11:03   Sodium 136 - 145 mmol/L 137   Potassium 3.4 - 5.3 mmol/L 4.5   Chloride 98 - 107 mmol/L 102   Carbon Dioxide (CO2) 22 - 29 mmol/L 29   Urea Nitrogen 8.0 - 23.0 mg/dL 13.1   Creatinine 0.51 - 0.95 mg/dL 0.61   GFR Estimate >60 mL/min/1.73m2 89   Calcium 8.8 - 10.2 mg/dL 8.8   Anion Gap 7 - 15 mmol/L 6 (L)   Albumin 3.5 - 5.2 g/dL 4.2   Protein Total 6.4 - 8.3 g/dL 6.0 (L)   Alkaline Phosphatase 35 - 104 U/L 139 (H)   ALT 10 - 35 U/L 20   AST 10 - 35 U/L 35   Bilirubin Total <=1.2 mg/dL 0.7   Glucose 70 - 99 mg/dL 96   Lactate Dehydrogenase 0 - 250 U/L 232   WBC 4.0 - 11.0 10e3/uL 5.6   Hemoglobin 11.7 - 15.7 g/dL 12.2   Hematocrit 35.0 - 47.0 % 35.1   Platelet Count 150 - 450 10e3/uL 130 (L)   RBC Count 3.80 - 5.20 10e6/uL 3.44 (L)   MCV 78 - 100 fL 102 (H)   MCH 26.5 - 33.0 pg 35.5 (H)   MCHC 31.5 - 36.5 g/dL 34.8   RDW 10.0 - 15.0 % 13.9   % Neutrophils % 46   % Lymphocytes % 43   % Monocytes % 11   % Eosinophils % 0   % Basophils % 0   Absolute Basophils 0.0 - 0.2 10e3/uL 0.0   Absolute Eosinophils 0.0 - 0.7 10e3/uL 0.0   Absolute Immature Granulocytes <=0.4 10e3/uL 0.0   Absolute Lymphocytes 0.8 - 5.3 10e3/uL 2.4   Absolute Monocytes 0.0 - 1.3 10e3/uL 0.6   % Immature Granulocytes % 0   Absolute Neutrophils 1.6 - 8.3 10e3/uL 2.6   Absolute NRBCs 10e3/uL 0.0   NRBCs per 100 WBC <1 /100 0   % Retic 0.5 - 2.0 % 1.9   Absolute Retic 0.025 - 0.095 10e6/uL 0.067       IMAGING:  BILATERAL FULL FIELD DIGITAL SCREENING MAMMOGRAM WITH TOMOSYNTHESIS     Performed on: 6/23/22     IMPRESSION: ACR BI-RADS Category 1: Negative     RECOMMENDED FOLLOW-UP: Annual routine screening mammogram      ASSESSMENT/PLAN:  Tricia Sosa is a 82 year old female with:        1. 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 Common variable immunodeficiency disorder  4. Splenomegaly           1) Autoimmune hemolytic anemia: She had relapse in July 2020, and started on prednisone, as well as weekly rituximab infusions x 4, completed on 8/21/20.  Since then, she has been off and on prednisone and rituximab, getting response each time, but relapses are getting more frequent.       She does not want to consider splenectomy yet.      She is completing another 4 doses of weekly rituxumab in May 2022, and then will go on maintenance rituximab to try to maintain her hemoglobin longer.  She wants to try to avoid having to go back on steroids if possible.     -She has tapered off of prednisone again as of early March 2022.    -Hemoglobin improved to 12.9.  -Monitor CBC monthly.  -She takes Bactrim for prophylaxis when she is on prednisone - currently off.  -Continue maintenance rituximab every 2 months.     2) CVID:   -Continue IVIG.  She gets it, if IgG is <600.  It's been about every other month.    -IgG check and IVIG every 4 weeks if meets parameters  -She saw immunology in [place] who recommended IVIG monthly, but our financial team continues to check and says that it is only covered with IgG is <600.      3) Iron-deficiency anemia  -s/p Venofer in May 2022.  -Recheck in 2 months.     4) Thrombocytopenia  -Waxing/waning.  -Monitor.    5) Hypertension  -Patient states her BP has been ranging 110s to 130's. I have asked her to keep a BP log at home. She will follow up with PCP in December 2022.   -She has not used hydrochlorothiazide in many months.     6) Okay for rituximab on 11/14/22. Follow up with FELIZ in 2 months with next date of rituximab. Alternate follow up between FELIZ and myself every 2 months with date of rituximab. Continue once monthly labs. IVIG is given essentially every other month at this point.    Leandra Ryder, DO  Hematology/Oncology  [facility]          Again, thank you for allowing me to participate in the care of your patient.        Sincerely,        Leandra Ryder, DO

## 2022-11-11 NOTE — LETTER
11/11/2022         RE: Tricia Sosa  [address]  [facility] [address]        Dear Colleague,    Thank you for referring your patient, Tricia Sosa, to the [facility]. Please see a copy of my visit note below.    Tricia is a 82 year old who is being evaluated via a billable telephone visit.  Currently in [place].    What phone number would you like to be contacted at? [phone]  How would you like to obtain your AVS? [email or web address]  Phone call duration: 14 minutes  JIM Ayala        [facility]    Hematology/Oncology Established Patient Follow-up Note    Treatment Summary:      Today's Date: 11/11/22    Reason for Follow-up: Hemolytic anemia-autoimmune; IgA deficiency     HISTORY OF PRESENT ILLNESS: Tricia Sosa is a 82 year old female who presents with autoimmune hemolytic anemia and IgA deficiency.  She previously saw Dr. Matos and then Dr. Summers.  She was previously seen at [facility].     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  She was very sick at the time and had severe diarrhea.  She lost about 40 pounds in weight.  The workup revealed that she had markedly diminished CD4 counts of 48 and was diagnosed with CMV colitis.  Since then she has been followed in hematology clinic at [place] until recently.  She was noted to have anemia which was fairly long-standing.  She was initially referred for anemia in 2004 and also had a bone marrow biopsy done at that time which revealed hypercellular bone marrow with 70-80% cellularity and normal trilineage hematopoiesis and no morphologic features of MDS or lymphoproliferation.  Her anemia was attributed to her chronic underlying disease.  At the next evaluation in 2002 on 4 aggressive anemia there was no evidence of hemolysis, iron deficiency, B12 or folate deficiency.  Bone marrow biopsy was not pursued.  In summer of 2015 she had progressive fatigue,  dyspnea on exertion and worsening anemia with a hemoglobin now of 9.  Workup at this time did suggest a hemolytic anemia with elevated LDH at 709, undetectable haptoglobin and elevated unconjugated bilirubin at 3.3.  Monospecific MARIKA was positive for both IgG and complement.  Cold agglutinin screen was positive with very low titer 1:64.  She was started on prednisone 60 mg daily with supplementation of iron folate and B12 starting 7/31/15.  Her prednisone has been slowly tapered and was discontinued off in January 2016.  She was last followed at [facility] on March 10, 2016 when she had stable hemoglobin.     She was followed by Dr. Matos and Dr. Summers.  Due to relapse of hemolytic anemia, she underwent another course of prednisone that has since been tapered off and rituximab x 4 weekly doses completed in 9/19/19.     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  She started weekly Rituxan on 7/31/20 x 4 doses, completed on 8/21/20.  She tapered off of prednisone in October 2020.     Due to relapse of her AIHA, she started prednisone again on 6/8/21 at 60 mg daily with slow taper and finished taper in August 2021.     Due to relapse of AIHA again, she started prednisone again on 11/2/21 at 70 mg daily with slow taper.  She also completed weekly Rituxan again x 4 doses 12/6/21-12/29/21.    Venofer 5/5-5/19/22.      INTERIM HISTORY:  Patient continues to do well.  She last received rituximab on 9/20/22. She continues now on maintenance every 2 months.     IVIG was last given on 10/14//22.     She has been working with physical therapy. She has had vertigo from time to time; she states this has been chronic since the 2010's. No weight loss, fatigue, LAD, night sweats, fever. No gross evidence of bleed.     She states her blood pressure has been varying 110s to 130s systolic. She has not used hydrochlorothiazide in many months. No headache, chest pain,  shortness of breath.        REVIEW OF SYSTEMS:   A 14 point ROS was reviewed with pertinent positives and negatives in the HPI.       HOME MEDICATIONS:  Current Outpatient Medications   Medication Sig Dispense Refill     clobetasol (TEMOVATE) 0.05 % external solution Apply topically daily as needed (itch) 50 mL 0     cyanocobalamin (VITAMIN  B-12) 1000 MCG tablet   3     Flaxseed, Linseed, (FLAX SEEDS PO) Take by mouth daily       folic acid (FOLVITE) 1 MG tablet   3     hydrochlorothiazide (HYDRODIURIL) 12.5 MG tablet Take 1 tablet (12.5 mg) by mouth daily as needed (edema) 30 tablet 5     ketoconazole (NIZORAL) 2 % external shampoo Use every other day to scalp as needed 120 mL 11     levothyroxine (SYNTHROID/LEVOTHROID) 150 MCG tablet Take 1 tablet  by mouth daily 90 tablet 0         ALLERGIES:  Allergies   Allergen Reactions     Doxycycline          PAST MEDICAL HISTORY:  Past Medical History:   Diagnosis Date     WARD (dyspnea on exertion)      External hemorrhoids without mention of complication 6/27/05     Hemolytic anemia (H)     autoimmune     Hypothyroidism      IgA deficiency (H)      Myopia of both eyes with astigmatism and presbyopia 8/16/2017     Other malaise and fatigue      Other severe protein-calorie malnutrition      Other vitreous opacities 12/19/2006     Thyroid disease      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    Hemorrhage Subdural Trauma Without LOC Active     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, and posterior vitreous detachment     Urinary tract infection, site not specified     Recurrent UTI's         PAST SURGICAL HISTORY:  Past Surgical History:   Procedure Laterality Date     COLONOSCOPY N/A 4/26/2016    Procedure: COLONOSCOPY;  Surgeon: Dontae Del Rio MD;  Location:  GI     ENT SURGERY  1985    Thyroid lobectomy     EYE SURGERY Bilateral 04/20/2021    Cataract Surgery     HC CYSTOSCOPY,INSERT URETERAL STENT      Ureteral stent  insertion     HC FLEX SIGMOIDOSCOPY W BIOPSY  00     HC LAPAROSCOPY, SURGICAL; CHOLECYSTECTOMY        THYROIDECTOMY       LIGATION OF HEMORRHOID(S)      Hemorrhoidectomy     UPPER GI ENDOSCOPY,BIOPSY  10/01/01     [facility] LIGATE FALLOPIAN TUBE       [facility] COLONOSCOPY W BIOPSY  00     [facility]     [facility]    REPEAT IN 5 YEARS         SOCIAL HISTORY:  Social History     Socioeconomic History     Marital status:      Spouse name: Not on file     Number of children: Not on file     Years of education: Not on file     Highest education level: Not on file   Occupational History     Not on file   Tobacco Use     Smoking status: Never     Smokeless tobacco: Never   Substance and Sexual Activity     Alcohol use: Yes     Alcohol/week: 0.0 standard drinks     Comment: 1 a day at most     Drug use: No     Sexual activity: Not Currently     Partners: Male   Other Topics Concern     Parent/sibling w/ CABG, MI or angioplasty before 65F 55M? No   Social History Narrative     Not on file     Social Determinants of Health     Financial Resource Strain: Not on file   Food Insecurity: Not on file   Transportation Needs: Not on file   Physical Activity: Not on file   Stress: Not on file   Social Connections: Not on file   Intimate Partner Violence: Not At Risk     Fear of Current or Ex-Partner: No     Emotionally Abused: No     Physically Abused: No     Sexually Abused: No   Housing Stability: Not on file         FAMILY HISTORY:  Family History   Problem Relation Age of Onset     Colon Cancer Mother 90     Cerebrovascular Disease Father          at age 85     Dementia Brother      Prostate Cancer Brother      Thyroid Disease Brother      Dementia Brother      Thyroid Disease Brother      Pancreatic Cancer Brother      Breast Cancer Sister      Hyperlipidemia Sister      Depression Sister      Anxiety Disorder Sister      Thyroid Disease Sister      Breast Cancer  Sister      Colon Cancer Niece      Other Cancer Niece         leukemia         PHYSICAL EXAM:  Telephone visit.     LABS:   Latest Reference Range & Units 11/11/22 11:03   Sodium 136 - 145 mmol/L 137   Potassium 3.4 - 5.3 mmol/L 4.5   Chloride 98 - 107 mmol/L 102   Carbon Dioxide (CO2) 22 - 29 mmol/L 29   Urea Nitrogen 8.0 - 23.0 mg/dL 13.1   Creatinine 0.51 - 0.95 mg/dL 0.61   GFR Estimate >60 mL/min/1.73m2 89   Calcium 8.8 - 10.2 mg/dL 8.8   Anion Gap 7 - 15 mmol/L 6 (L)   Albumin 3.5 - 5.2 g/dL 4.2   Protein Total 6.4 - 8.3 g/dL 6.0 (L)   Alkaline Phosphatase 35 - 104 U/L 139 (H)   ALT 10 - 35 U/L 20   AST 10 - 35 U/L 35   Bilirubin Total <=1.2 mg/dL 0.7   Glucose 70 - 99 mg/dL 96   Lactate Dehydrogenase 0 - 250 U/L 232   WBC 4.0 - 11.0 10e3/uL 5.6   Hemoglobin 11.7 - 15.7 g/dL 12.2   Hematocrit 35.0 - 47.0 % 35.1   Platelet Count 150 - 450 10e3/uL 130 (L)   RBC Count 3.80 - 5.20 10e6/uL 3.44 (L)   MCV 78 - 100 fL 102 (H)   MCH 26.5 - 33.0 pg 35.5 (H)   MCHC 31.5 - 36.5 g/dL 34.8   RDW 10.0 - 15.0 % 13.9   % Neutrophils % 46   % Lymphocytes % 43   % Monocytes % 11   % Eosinophils % 0   % Basophils % 0   Absolute Basophils 0.0 - 0.2 10e3/uL 0.0   Absolute Eosinophils 0.0 - 0.7 10e3/uL 0.0   Absolute Immature Granulocytes <=0.4 10e3/uL 0.0   Absolute Lymphocytes 0.8 - 5.3 10e3/uL 2.4   Absolute Monocytes 0.0 - 1.3 10e3/uL 0.6   % Immature Granulocytes % 0   Absolute Neutrophils 1.6 - 8.3 10e3/uL 2.6   Absolute NRBCs 10e3/uL 0.0   NRBCs per 100 WBC <1 /100 0   % Retic 0.5 - 2.0 % 1.9   Absolute Retic 0.025 - 0.095 10e6/uL 0.067       IMAGING:  BILATERAL FULL FIELD DIGITAL SCREENING MAMMOGRAM WITH TOMOSYNTHESIS     Performed on: 6/23/22     IMPRESSION: ACR BI-RADS Category 1: Negative     RECOMMENDED FOLLOW-UP: Annual routine screening mammogram      ASSESSMENT/PLAN:  Tricia Sosa is a 82 year old female with:        1. 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 Common variable immunodeficiency disorder  4. Splenomegaly           1) Autoimmune hemolytic anemia: She had relapse in July 2020, and started on prednisone, as well as weekly rituximab infusions x 4, completed on 8/21/20.  Since then, she has been off and on prednisone and rituximab, getting response each time, but relapses are getting more frequent.       She does not want to consider splenectomy yet.      She is completing another 4 doses of weekly rituxumab in May 2022, and then will go on maintenance rituximab to try to maintain her hemoglobin longer.  She wants to try to avoid having to go back on steroids if possible.     -She has tapered off of prednisone again as of early March 2022.    -Hemoglobin improved to 12.9.  -Monitor CBC monthly.  -She takes Bactrim for prophylaxis when she is on prednisone - currently off.  -Continue maintenance rituximab every 2 months.     2) CVID:   -Continue IVIG.  She gets it, if IgG is <600.  It's been about every other month.    -IgG check and IVIG every 4 weeks if meets parameters  -She saw immunology in [place] who recommended IVIG monthly, but our financial team continues to check and says that it is only covered with IgG is <600.      3) Iron-deficiency anemia  -s/p Venofer in May 2022.  -Recheck in 2 months.     4) Thrombocytopenia  -Waxing/waning.  -Monitor.    5) Hypertension  -Patient states her BP has been ranging 110s to 130's. I have asked her to keep a BP log at home. She will follow up with PCP in December 2022.   -She has not used hydrochlorothiazide in many months.     6) Okay for rituximab on 11/14/22. Follow up with FELIZ in 2 months with next date of rituximab. Alternate follow up between FELIZ and myself every 2 months with date of rituximab. Continue once monthly labs. IVIG is given essentially every other month at this point.    Leandra Ryder, DO  Hematology/Oncology  [facility]          Again, thank you for allowing me to participate in the care of your patient.        Sincerely,        Leandra Ryder, DO

## 2022-11-11 NOTE — PROGRESS NOTES
Medical Assistant Note:  Tricia Sosa presents today for blood draw.    Patient seen by provider today: No.   present during visit today: Not Applicable.    Concerns: No Concerns.    Procedure:  Lab draw site: rt antecub, Needle type: butterfly, Gauge: 23.    Post Assessment:  Labs drawn without difficulty: Yes.    Discharge Plan:  Departure Mode: Ambulatory.    Face to Face Time: 10 mins.    Corrie Hurt CMA, CMA

## 2022-11-12 DIAGNOSIS — E03.9 ACQUIRED HYPOTHYROIDISM: ICD-10-CM

## 2022-11-14 ENCOUNTER — INFUSION THERAPY VISIT (OUTPATIENT)
Dept: INFUSION THERAPY | Facility: CLINIC | Age: 82
End: 2022-11-14
Attending: INTERNAL MEDICINE
Payer: MEDICARE

## 2022-11-14 VITALS
TEMPERATURE: 97.9 F | DIASTOLIC BLOOD PRESSURE: 74 MMHG | WEIGHT: 148 LBS | SYSTOLIC BLOOD PRESSURE: 138 MMHG | BODY MASS INDEX: 20.07 KG/M2 | HEART RATE: 72 BPM | RESPIRATION RATE: 16 BRPM | OXYGEN SATURATION: 100 %

## 2022-11-14 DIAGNOSIS — D83.9 CVID (COMMON VARIABLE IMMUNODEFICIENCY) (H): Primary | ICD-10-CM

## 2022-11-14 DIAGNOSIS — D59.19 OTHER AUTOIMMUNE HEMOLYTIC ANEMIA (H): ICD-10-CM

## 2022-11-14 LAB
HAPTOGLOB SERPL-MCNC: <3 MG/DL (ref 32–197)
IGA SERPL-MCNC: <2 MG/DL (ref 84–499)
IGG SERPL-MCNC: 661 MG/DL (ref 610–1616)
IGM SERPL-MCNC: <10 MG/DL (ref 35–242)

## 2022-11-14 PROCEDURE — 96413 CHEMO IV INFUSION 1 HR: CPT

## 2022-11-14 PROCEDURE — 250N000011 HC RX IP 250 OP 636: Performed by: INTERNAL MEDICINE

## 2022-11-14 PROCEDURE — 250N000013 HC RX MED GY IP 250 OP 250 PS 637: Performed by: INTERNAL MEDICINE

## 2022-11-14 PROCEDURE — 258N000003 HC RX IP 258 OP 636: Performed by: INTERNAL MEDICINE

## 2022-11-14 RX ORDER — DIPHENHYDRAMINE HCL 25 MG
25 CAPSULE ORAL ONCE
Status: COMPLETED | OUTPATIENT
Start: 2022-11-14 | End: 2022-11-14

## 2022-11-14 RX ORDER — LEVOTHYROXINE SODIUM 150 UG/1
TABLET ORAL
Qty: 90 TABLET | Refills: 0 | Status: SHIPPED | OUTPATIENT
Start: 2022-11-14 | End: 2023-03-16

## 2022-11-14 RX ORDER — ACETAMINOPHEN 325 MG/1
650 TABLET ORAL ONCE
Status: COMPLETED | OUTPATIENT
Start: 2022-11-14 | End: 2022-11-14

## 2022-11-14 RX ADMIN — ACETAMINOPHEN 650 MG: 325 TABLET ORAL at 11:24

## 2022-11-14 RX ADMIN — SODIUM CHLORIDE 250 ML: 9 INJECTION, SOLUTION INTRAVENOUS at 12:07

## 2022-11-14 RX ADMIN — RITUXIMAB-ABBS 700 MG: 10 INJECTION, SOLUTION INTRAVENOUS at 12:07

## 2022-11-14 RX ADMIN — DIPHENHYDRAMINE HYDROCHLORIDE 25 MG: 25 CAPSULE ORAL at 11:24

## 2022-11-14 NOTE — PROGRESS NOTES
Infusion Nursing Note:  Tricia Sosa presents today for Rapid Rituxan.    Patient seen by provider today: No   present during visit today: Not Applicable.    Note: N/A.    Intravenous Access:  Peripheral IV placed.    Treatment Conditions:  Not Applicable.    Post Infusion Assessment:  Patient tolerated infusion without incident.  Blood return noted pre and post infusion.  Site patent and intact, free from redness, edema or discomfort.  No evidence of extravasations.  Access discontinued per protocol.     Discharge Plan:   AVS to patient via MYCHART.  Patient will return 12/9/22 for IVIG for next appointment.   Patient discharged in stable condition accompanied by: self.  Departure Mode: Ambulatory.      Ally Adams RN

## 2022-11-15 ENCOUNTER — VIRTUAL VISIT (OUTPATIENT)
Dept: ONCOLOGY | Facility: CLINIC | Age: 82
End: 2022-11-15
Attending: STUDENT IN AN ORGANIZED HEALTH CARE EDUCATION/TRAINING PROGRAM
Payer: MEDICARE

## 2022-11-15 DIAGNOSIS — R53.83 OTHER FATIGUE: ICD-10-CM

## 2022-11-15 DIAGNOSIS — D59.10 AUTOIMMUNE HEMOLYTIC ANEMIA (H): Primary | ICD-10-CM

## 2022-11-15 DIAGNOSIS — M62.81 GENERALIZED MUSCLE WEAKNESS: ICD-10-CM

## 2022-11-15 PROCEDURE — 99215 OFFICE O/P EST HI 40 MIN: CPT | Mod: 95 | Performed by: STUDENT IN AN ORGANIZED HEALTH CARE EDUCATION/TRAINING PROGRAM

## 2022-11-15 NOTE — LETTER
11/15/2022         RE: Tricia Sosa  [address]  [place] [address]        Dear Colleague,    Thank you for referring your patient, Tricia Sosa, to the [facility]. Please see a copy of my visit note below.    Tricia is a 82 year old who is being evaluated via a billable video visit.  Currently in [place].    How would you like to obtain your AVS? MyChart  If the video visit is dropped, the invitation should be resent by: Send to e-mail at: [email or web address]  Will anyone else be joining your video visit? No     JIM Ayala      Video-Visit Details    Video Start Time: 2:03 PM    Type of service:  Video Visit    Video End Time:2:30 PM    Originating Location (pt. Location): Home        Distant Location (provider location):  On-site    Platform used for Video Visit: [facility]   PM&R clinic note        Interval history:     Tricia Sosa presents to clinic today via video visit for follow up reg her rehab needs.   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  Was last seen in clinic on 08/16/2022  Recommendations included   1. Work-up: none at this time. If her balance and weakness is getting worse or not improving with therapies, may consider EMG to rule differential diagnosis.  2. Therapy/equipment/braces:     Continue using quad cane for long distance ambulation    Order placed for TLSO to be used for long periods of ambulation only and not to be used for daily chronic use, discussed with patient.  4. Referral / follow up with other providers:    1. Nutrition consult placed  2. Continue follow up with PCP and hematology/oncology as indicated.   5. Follow up: 3 months     Medical issues since last visit,  No acute hospitalizations or ED visits.    Symptoms,  Tricia says for the most part she has been doing fine except for her episode of vertigo a few weeks ago.  She has had  2 sessions with PT for vertigo management first tolerated well and second with a bigger reaction.  Since last treatment has not had another episode.  She has remained active with daily walks with her dog.  She continues to do outpatient PT, with HEP.  During discussion she endorses ongoing back pain but has been unable to receive TSLO due to lack of Medicare coverage.  Her appetite is otherwise unchanged, eating well, and keeping her food balanced.  She is also sleeping okay and feels rested in the mornings, despite ongoing difficulties initially getting out of bed.      Therapies/HEP,  PT has been going ok, feels she should be stronger for the duration of time she has done therapy.  Has been able to otherwise reach overhead cabinets without significant difficulties.  Continues to do home exercise therapies    Functionally,   Noted mild improvement improvement with overhead placement/ reaching for things.    Social history is unchanged.    Medications:  Current Outpatient Medications   Medication Sig Dispense Refill     clobetasol (TEMOVATE) 0.05 % external solution Apply topically daily as needed (itch) 50 mL 0     cyanocobalamin (VITAMIN  B-12) 1000 MCG tablet   3     Flaxseed, Linseed, (FLAX SEEDS PO) Take by mouth daily       folic acid (FOLVITE) 1 MG tablet   3     hydrochlorothiazide (HYDRODIURIL) 12.5 MG tablet Take 1 tablet (12.5 mg) by mouth daily as needed (edema) 30 tablet 5     ketoconazole (NIZORAL) 2 % external shampoo Use every other day to scalp as needed 120 mL 11     levothyroxine (SYNTHROID/LEVOTHROID) 150 MCG tablet Take 1 tablet  by mouth daily 90 tablet 0              Physical Exam:   LMP  (LMP Unknown) This was a video visit no physical exam done.  Labs/Imaging:  Lab Results   Component Value Date    WBC 5.6 11/11/2022    HGB 12.2 11/11/2022    HCT 35.1 11/11/2022     (H) 11/11/2022     (L) 11/11/2022     Lab Results   Component Value Date     11/11/2022    POTASSIUM 4.5  11/11/2022    CHLORIDE 102 11/11/2022    CO2 29 11/11/2022    GLC 96 11/11/2022     Lab Results   Component Value Date    GFRESTIMATED 89 11/11/2022    GFRESTBLACK >90 06/29/2021     Lab Results   Component Value Date    AST 35 11/11/2022    ALT 20 11/11/2022    ALKPHOS 139 (H) 11/11/2022    BILITOTAL 0.7 11/11/2022    BILIDIRECT 0.30 05/19/2000     No results found for: INR  Lab Results   Component Value Date    BUN 13.1 11/11/2022    CR 0.61 11/11/2022              Assessment/Plan   Tricia Sosa is a 81 year old female with with autoimmune hemolytic anemia and IgA deficiency.  She presents to the clinic for follow-up clinic for her rehabilitation needs.  She was last seen in clinic on 08/16/2022.   Per discussion today she shows improvement in her generalized/proximal muscle weakness with ongoing PT, did have episode of vertigo which currently has improved following 2 sessions of PT.  Discussed with her plan to continue with PT and hold of further assessment with EMG as planned earlier.  Patient currently happy with nutritional intake.  Will defer from resending nutritional consult.    1. Work-up: Hold off EMG  2. Therapy/equipment/braces:    - Patient is encouraged to look for alternative TSLO for spinal support.  3. Medications: No new updates today   4. Interventions: None today  5. Referral / follow up with other providers: None today   6. Follow up: in 6 mo with Dr. Sly Heart MD  Physical Medicine & Rehabilitation      60 minutes spent on the date of the encounter doing chart review, history and exam, documentation and further activities as noted above.                Again, thank you for allowing me to participate in the care of your patient.        Sincerely,        Marley Heart MD

## 2022-11-15 NOTE — PROGRESS NOTES
Tricia is a 82 year old who is being evaluated via a billable video visit.  Currently in [place].    How would you like to obtain your AVS? MyChart  If the video visit is dropped, the invitation should be resent by: Send to e-mail at: [email or web address]  Will anyone else be joining your video visit? JIM Elder      Video-Visit Details    Video Start Time: 2:03 PM    Type of service:  Video Visit    Video End Time:2:30 PM    Originating Location (pt. Location): Home        Distant Location (provider location):  On-site    Platform used for Video Visit: F[employer]

## 2022-11-15 NOTE — TELEPHONE ENCOUNTER
Pending Prescriptions:                       Disp   Refills    levothyroxine (SYNTHROID/LEVOTHROID) 150 *90 tab*0            Sig: Take 1 tablet  by mouth daily    Medication is being filled for 1 time refill only due to:  Patient needs to be seen because it has been more than one year since last visit.     Next 5 appointments (look out 90 days)    Dec 23, 2022 11:30 AM  (Arrive by 11:10 AM)  Annual Wellness Visit with Emily Church MD  [facility] ([facility] ) [address] [facility] [address]  [phone]   Jan 06, 2023  3:30 PM  Return Visit with CHELSEA Marlow  [facility] ([facility] ) [address]  [facility] [address]  [phone]

## 2022-11-15 NOTE — PROGRESS NOTES
[facility]   PM&R clinic note        Interval history:     Tricia Sosa presents to clinic today via video visit for follow up reg her rehab needs.   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  Was last seen in clinic on 08/16/2022  Recommendations included   1. Work-up: none at this time. If her balance and weakness is getting worse or not improving with therapies, may consider EMG to rule differential diagnosis.  2. Therapy/equipment/braces:     Continue using quad cane for long distance ambulation    Order placed for TLSO to be used for long periods of ambulation only and not to be used for daily chronic use, discussed with patient.  4. Referral / follow up with other providers:    1. Nutrition consult placed  2. Continue follow up with PCP and hematology/oncology as indicated.   5. Follow up: 3 months     Medical issues since last visit,  No acute hospitalizations or ED visits.    Symptoms,  Tricia says for the most part she has been doing fine except for her episode of vertigo a few weeks ago.  She has had 2 sessions with PT for vertigo management first tolerated well and second with a bigger reaction.  Since last treatment has not had another episode.  She has remained active with daily walks with her dog.  She continues to do outpatient PT, with HEP.  During discussion she endorses ongoing back pain but has been unable to receive TSLO due to lack of Medicare coverage.  Her appetite is otherwise unchanged, eating well, and keeping her food balanced.  She is also sleeping okay and feels rested in the mornings, despite ongoing difficulties initially getting out of bed.      Therapies/HEP,  PT has been going ok, feels she should be stronger for the duration of time she has done therapy.  Has been able to otherwise reach overhead cabinets without significant difficulties.  Continues to do home exercise therapies    Functionally,    Noted mild improvement improvement with overhead placement/ reaching for things.    Social history is unchanged.    Medications:  Current Outpatient Medications   Medication Sig Dispense Refill     clobetasol (TEMOVATE) 0.05 % external solution Apply topically daily as needed (itch) 50 mL 0     cyanocobalamin (VITAMIN  B-12) 1000 MCG tablet   3     Flaxseed, Linseed, (FLAX SEEDS PO) Take by mouth daily       folic acid (FOLVITE) 1 MG tablet   3     hydrochlorothiazide (HYDRODIURIL) 12.5 MG tablet Take 1 tablet (12.5 mg) by mouth daily as needed (edema) 30 tablet 5     ketoconazole (NIZORAL) 2 % external shampoo Use every other day to scalp as needed 120 mL 11     levothyroxine (SYNTHROID/LEVOTHROID) 150 MCG tablet Take 1 tablet  by mouth daily 90 tablet 0              Physical Exam:   LMP  (LMP Unknown) This was a video visit no physical exam done.  Labs/Imaging:  Lab Results   Component Value Date    WBC 5.6 11/11/2022    HGB 12.2 11/11/2022    HCT 35.1 11/11/2022     (H) 11/11/2022     (L) 11/11/2022     Lab Results   Component Value Date     11/11/2022    POTASSIUM 4.5 11/11/2022    CHLORIDE 102 11/11/2022    CO2 29 11/11/2022    GLC 96 11/11/2022     Lab Results   Component Value Date    GFRESTIMATED 89 11/11/2022    GFRESTBLACK >90 06/29/2021     Lab Results   Component Value Date    AST 35 11/11/2022    ALT 20 11/11/2022    ALKPHOS 139 (H) 11/11/2022    BILITOTAL 0.7 11/11/2022    BILIDIRECT 0.30 05/19/2000     No results found for: INR  Lab Results   Component Value Date    BUN 13.1 11/11/2022    CR 0.61 11/11/2022              Assessment/Plan   Tricia Sosa is a 81 year old female with with autoimmune hemolytic anemia and IgA deficiency.  She presents to the clinic for follow-up clinic for her rehabilitation needs.  She was last seen in clinic on 08/16/2022.   Per discussion today she shows improvement in her generalized/proximal muscle weakness with ongoing PT, did have episode of  vertigo which currently has improved following 2 sessions of PT.  Discussed with her plan to continue with PT and hold of further assessment with EMG as planned earlier.  Patient currently happy with nutritional intake.  Will defer from resending nutritional consult.    1. Work-up: Hold off EMG  2. Therapy/equipment/braces:    - Patient is encouraged to look for alternative TSLO for spinal support.  3. Medications: No new updates today   4. Interventions: None today  5. Referral / follow up with other providers: None today   6. Follow up: in 6 mo with Dr. Sly Heart MD  Physical Medicine & Rehabilitation      60 minutes spent on the date of the encounter doing chart review, history and exam, documentation and further activities as noted above.

## 2022-11-15 NOTE — LETTER
11/15/2022         RE: Tricia Sosa  [address]  [place] [facility] [address]        Dear Colleague,    Thank you for referring your patient, Tricia Sosa, to the [facility]. Please see a copy of my visit note below.    Tricia is a 82 year old who is being evaluated via a billable video visit.  Currently in [place].    How would you like to obtain your AVS? MyChart  If the video visit is dropped, the invitation should be resent by: Send to e-mail at: [email or web address]  Will anyone else be joining your video visit? No     JIM Ayala      Video-Visit Details    Video Start Time: 2:03 PM    Type of service:  Video Visit    Video End Time:2:30 PM    Originating Location (pt. Location): Home        Distant Location (provider location):  On-site    Platform used for Video Visit: [facility]   PM&R clinic note        Interval history:     Tricia Sosa presents to clinic today via video visit for follow up reg her rehab needs.   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  Was last seen in clinic on 08/16/2022  Recommendations included   1. Work-up: none at this time. If her balance and weakness is getting worse or not improving with therapies, may consider EMG to rule differential diagnosis.  2. Therapy/equipment/braces:     Continue using quad cane for long distance ambulation    Order placed for TLSO to be used for long periods of ambulation only and not to be used for daily chronic use, discussed with patient.  4. Referral / follow up with other providers:    1. Nutrition consult placed  2. Continue follow up with PCP and hematology/oncology as indicated.   5. Follow up: 3 months     Medical issues since last visit,  No acute hospitalizations or ED visits.    Symptoms,  Tricia says for the most part she has been doing fine except for her episode of vertigo a few weeks ago.  She has had  2 sessions with PT for vertigo management first tolerated well and second with a bigger reaction.  Since last treatment has not had another episode.  She has remained active with daily walks with her dog.  She continues to do outpatient PT, with HEP.  During discussion she endorses ongoing back pain but has been unable to receive TSLO due to lack of Medicare coverage.  Her appetite is otherwise unchanged, eating well, and keeping her food balanced.  She is also sleeping okay and feels rested in the mornings, despite ongoing difficulties initially getting out of bed.      Therapies/HEP,  PT has been going ok, feels she should be stronger for the duration of time she has done therapy.  Has been able to otherwise reach overhead cabinets without significant difficulties.  Continues to do home exercise therapies    Functionally,   Noted mild improvement improvement with overhead placement/ reaching for things.    Social history is unchanged.    Medications:  Current Outpatient Medications   Medication Sig Dispense Refill     clobetasol (TEMOVATE) 0.05 % external solution Apply topically daily as needed (itch) 50 mL 0     cyanocobalamin (VITAMIN  B-12) 1000 MCG tablet   3     Flaxseed, Linseed, (FLAX SEEDS PO) Take by mouth daily       folic acid (FOLVITE) 1 MG tablet   3     hydrochlorothiazide (HYDRODIURIL) 12.5 MG tablet Take 1 tablet (12.5 mg) by mouth daily as needed (edema) 30 tablet 5     ketoconazole (NIZORAL) 2 % external shampoo Use every other day to scalp as needed 120 mL 11     levothyroxine (SYNTHROID/LEVOTHROID) 150 MCG tablet Take 1 tablet  by mouth daily 90 tablet 0              Physical Exam:   LMP  (LMP Unknown) This was a video visit no physical exam done.  Labs/Imaging:  Lab Results   Component Value Date    WBC 5.6 11/11/2022    HGB 12.2 11/11/2022    HCT 35.1 11/11/2022     (H) 11/11/2022     (L) 11/11/2022     Lab Results   Component Value Date     11/11/2022    POTASSIUM 4.5  11/11/2022    CHLORIDE 102 11/11/2022    CO2 29 11/11/2022    GLC 96 11/11/2022     Lab Results   Component Value Date    GFRESTIMATED 89 11/11/2022    GFRESTBLACK >90 06/29/2021     Lab Results   Component Value Date    AST 35 11/11/2022    ALT 20 11/11/2022    ALKPHOS 139 (H) 11/11/2022    BILITOTAL 0.7 11/11/2022    BILIDIRECT 0.30 05/19/2000     No results found for: INR  Lab Results   Component Value Date    BUN 13.1 11/11/2022    CR 0.61 11/11/2022              Assessment/Plan   Tricia Sosa is a 81 year old female with with autoimmune hemolytic anemia and IgA deficiency.  She presents to the clinic for follow-up clinic for her rehabilitation needs.  She was last seen in clinic on 08/16/2022.   Per discussion today she shows improvement in her generalized/proximal muscle weakness with ongoing PT, did have episode of vertigo which currently has improved following 2 sessions of PT.  Discussed with her plan to continue with PT and hold of further assessment with EMG as planned earlier.  Patient currently happy with nutritional intake.  Will defer from resending nutritional consult.    1. Work-up: Hold off EMG  2. Therapy/equipment/braces:    - Patient is encouraged to look for alternative TSLO for spinal support.  3. Medications: No new updates today   4. Interventions: None today  5. Referral / follow up with other providers: None today   6. Follow up: in 6 mo with Dr. Sly Heart MD  Physical Medicine & Rehabilitation      60 minutes spent on the date of the encounter doing chart review, history and exam, documentation and further activities as noted above.                Again, thank you for allowing me to participate in the care of your patient.        Sincerely,        Marley Heart MD

## 2022-11-15 NOTE — NURSING NOTE
Patient declined individual allergy and medication review by support staff because meds were reviewed yesterday and no changes per pt.    Connie Batista, VF

## 2022-11-16 ENCOUNTER — HOSPITAL ENCOUNTER (OUTPATIENT)
Dept: PHYSICAL THERAPY | Facility: CLINIC | Age: 82
Setting detail: THERAPIES SERIES
Discharge: HOME | End: 2022-11-16
Attending: INTERNAL MEDICINE
Payer: MEDICARE

## 2022-11-16 ENCOUNTER — TELEPHONE (OUTPATIENT)
Dept: ONCOLOGY | Facility: CLINIC | Age: 82
End: 2022-11-16

## 2022-11-16 PROCEDURE — 97164 PT RE-EVAL EST PLAN CARE: CPT | Mod: GP | Performed by: PHYSICAL THERAPIST

## 2022-11-16 PROCEDURE — 97112 NEUROMUSCULAR REEDUCATION: CPT | Mod: GP | Performed by: PHYSICAL THERAPIST

## 2022-11-16 NOTE — PATIENT INSTRUCTIONS
Continue with current nutritional plan.  Can reconsider nutritional support in the future if need be.  If able to find alternative TSLO,  you are encouraged to use it for spinal support.  Follow-up with Dr. Heart in 6 months.        Thank you for the opportunity to participate in your care.

## 2022-11-16 NOTE — TELEPHONE ENCOUNTER
Called patient to schedule follow up from appointment yesterday with Dr. Heart. No answer. Left message with scheduling number.

## 2022-11-16 NOTE — PROGRESS NOTES
11/16/22 1514   Quick Adds   Type of Visit OP PT Re-evaluation   General Information   Start of Care Date 06/08/22   Referring Physician Antonio Nash MD   Orders Evaluate and Treat as Indicated   Order Date 11/04/22   Medical Diagnosis Benign paroxysmal positional vertigo, unspecified laterality (H81.10   Onset of illness/injury or Date of Surgery 11/03/22   Precautions/Limitations fall precautions   Pertinent history of current problem (include personal factors and/or comorbidities that impact the POC) pt reports she is no longer having spinning but gets a sense of dizziness when she first gets out of bed in the morning. Once up, she does not have any dizziness the rest of the day.   System Outcome Measures   Dizziness Handicap Inventory (score out of 100) A decrease in score by 17.18 or greater indicates a clinically significant change in symptoms. 4  (improved from 64)   Infrared Goggle Exam or Frenzel Lense Exam   Vestibular Suppressant in Last 24 Hours? No   Exam completed with Infrared Goggles   Spontaneous Nystagmus Negative   Positional Testing comments able to move sit to supine and roll to each side without dizziness/vertigo or nystagmus   Jossie-Hallpike (right) Negative   Jossie-Hallpike (right) comments repeated loaded jossie-hallpike- negative   HSCC Supine Roll Test (Right) Negative   HSCC Supine Roll Test (Left) Negative   Planned Therapy Interventions   Planned Therapy Interventions balance training;gait training;neuromuscular re-education;strengthening;stretching;transfer training;manual therapy   Planned Therapy Interventions Comment canalith repositioning maneuvers as indicated   Clinical Impression   Criteria for Skilled Therapeutic Interventions Met yes, treatment indicated   PT Diagnosis decreased functional activity tolerance and independence.   Risk & Benefits of therapy have been explained Yes   Patient, Family & other staff in agreement with plan of care Yes   Clinical Impression Comments  Tricia returned to PT for assessment and ongoing treatment of positional vertigo. She reports improvement with no more sense of spinning but still is reporting some mild dizziness getting out of bed in the morning. Fortunately, her IR exam is negative with no indication of BPPV following last session with CRM. patient was educated in habituation to help resolve remaining dizziness. She will benefit from continued skilled neuro PT intervention now that vertigo is resolved. Will continue to address remaining goals for gait and balance.   Goal 1   Goal Identifier HEP   Goal Description Tricia will be independent in a home ex and activity program to address her impairments and functional limitations.   Goal Progress 8/9 - in progress.  modifications and progressions continue.   Target Date 02/01/23   Goal 2   Goal Identifier Gait Tolerance   Goal Description Tricia will be able to walk 300 feet with her cane to demonstrate increased tolerance for community mobility for attending MD appointments and shopping tasks.   Goal Progress 8/9 - not walking very far, gets tired, can't stand upright very long.   Target Date 02/01/23   Goal 3   Goal Identifier Position changes   Goal Description Patient will deny dizziness with change of body position for independent bed mobility and transfers for return to daily activities without limitation.   Goal Progress 11/16/22: dizziness, not spinning, with sitting up in bed. no dizziness with rolling. able to perform transfers throughout the rest of the day without dizziness.   Target Date 02/01/23   Goal 4   Goal Identifier Griffin   Goal Description Tricia will be able to score 46/56 or greater on the Griffin to demonstrate low falls risk with daily activities.   Goal Progress 8/9 - not able to test today d/t time.  will test at next session.   Target Date 02/01/23   Goal 5   Goal Identifier DHI   Goal Description Patient will complete the DHI with a score of <30/100 to demonstrate decreased  perception of handicap and improved quality of life.   Goal Progress goal met. 11/16/22: 4/100. 11/4/22: 64/100   Target Date 02/01/23   Date Met 11/16/22   Total Evaluation Time   PT Eval, Re-eval Minutes ([id number]) 15

## 2022-11-20 NOTE — PATIENT INSTRUCTIONS
Tricia is scheduled for the following:     - Labs on 12/05/22  - IVIG on 12/09/22  - Labs and follow up with Isaiah on 01/06/23  - Rituxan on 01/09/23  - Labs on 02/06/23  - IVIG on 02/10/23    Brandee Bowman RN on 11/19/2022 at 6:00 PM

## 2022-12-05 ENCOUNTER — LAB (OUTPATIENT)
Dept: ONCOLOGY | Facility: CLINIC | Age: 82
End: 2022-12-05
Attending: INTERNAL MEDICINE
Payer: MEDICARE

## 2022-12-05 DIAGNOSIS — D59.10 AUTOIMMUNE HEMOLYTIC ANEMIA (H): ICD-10-CM

## 2022-12-05 LAB
ALBUMIN SERPL BCG-MCNC: 4.4 G/DL (ref 3.5–5.2)
ALP SERPL-CCNC: 142 U/L (ref 35–104)
ALT SERPL W P-5'-P-CCNC: 16 U/L (ref 10–35)
ANION GAP SERPL CALCULATED.3IONS-SCNC: 8 MMOL/L (ref 7–15)
AST SERPL W P-5'-P-CCNC: 30 U/L (ref 10–35)
BASOPHILS # BLD AUTO: 0 10E3/UL (ref 0–0.2)
BASOPHILS NFR BLD AUTO: 0 %
BILIRUB SERPL-MCNC: 0.8 MG/DL
BUN SERPL-MCNC: 14.3 MG/DL (ref 8–23)
CALCIUM SERPL-MCNC: 8.8 MG/DL (ref 8.8–10.2)
CHLORIDE SERPL-SCNC: 102 MMOL/L (ref 98–107)
CREAT SERPL-MCNC: 0.59 MG/DL (ref 0.51–0.95)
DEPRECATED HCO3 PLAS-SCNC: 28 MMOL/L (ref 22–29)
EOSINOPHIL # BLD AUTO: 0 10E3/UL (ref 0–0.7)
EOSINOPHIL NFR BLD AUTO: 1 %
ERYTHROCYTE [DISTWIDTH] IN BLOOD BY AUTOMATED COUNT: 14.2 % (ref 10–15)
GFR SERPL CREATININE-BSD FRML MDRD: 89 ML/MIN/1.73M2
GLUCOSE SERPL-MCNC: 95 MG/DL (ref 70–99)
HCT VFR BLD AUTO: 35.9 % (ref 35–47)
HGB BLD-MCNC: 12.1 G/DL (ref 11.7–15.7)
IMM GRANULOCYTES # BLD: 0 10E3/UL
IMM GRANULOCYTES NFR BLD: 1 %
LDH SERPL L TO P-CCNC: 268 U/L (ref 0–250)
LYMPHOCYTES # BLD AUTO: 2.4 10E3/UL (ref 0.8–5.3)
LYMPHOCYTES NFR BLD AUTO: 36 %
MCH RBC QN AUTO: 34.9 PG (ref 26.5–33)
MCHC RBC AUTO-ENTMCNC: 33.7 G/DL (ref 31.5–36.5)
MCV RBC AUTO: 104 FL (ref 78–100)
MONOCYTES # BLD AUTO: 0.8 10E3/UL (ref 0–1.3)
MONOCYTES NFR BLD AUTO: 12 %
NEUTROPHILS # BLD AUTO: 3.4 10E3/UL (ref 1.6–8.3)
NEUTROPHILS NFR BLD AUTO: 50 %
NRBC # BLD AUTO: 0 10E3/UL
NRBC BLD AUTO-RTO: 0 /100
PLATELET # BLD AUTO: 160 10E3/UL (ref 150–450)
POTASSIUM SERPL-SCNC: 4.3 MMOL/L (ref 3.4–5.3)
PROT SERPL-MCNC: 6 G/DL (ref 6.4–8.3)
RBC # BLD AUTO: 3.47 10E6/UL (ref 3.8–5.2)
RETICS # AUTO: 0.08 10E6/UL (ref 0.03–0.1)
RETICS/RBC NFR AUTO: 2.3 % (ref 0.5–2)
SODIUM SERPL-SCNC: 138 MMOL/L (ref 136–145)
WBC # BLD AUTO: 6.6 10E3/UL (ref 4–11)

## 2022-12-05 PROCEDURE — 83010 ASSAY OF HAPTOGLOBIN QUANT: CPT | Performed by: INTERNAL MEDICINE

## 2022-12-05 PROCEDURE — 85045 AUTOMATED RETICULOCYTE COUNT: CPT | Performed by: INTERNAL MEDICINE

## 2022-12-05 PROCEDURE — 83615 LACTATE (LD) (LDH) ENZYME: CPT | Performed by: INTERNAL MEDICINE

## 2022-12-05 PROCEDURE — 36415 COLL VENOUS BLD VENIPUNCTURE: CPT

## 2022-12-05 PROCEDURE — 84450 TRANSFERASE (AST) (SGOT): CPT | Performed by: INTERNAL MEDICINE

## 2022-12-05 PROCEDURE — 82784 ASSAY IGA/IGD/IGG/IGM EACH: CPT | Performed by: INTERNAL MEDICINE

## 2022-12-05 PROCEDURE — 85025 COMPLETE CBC W/AUTO DIFF WBC: CPT | Performed by: INTERNAL MEDICINE

## 2022-12-05 NOTE — PROGRESS NOTES
Medical Assistant Note:  Tricia Sosa presents today for blood draw.    Patient seen by provider today: No.   present during visit today: Not Applicable.    Concerns: No Concerns.    Procedure:  Labs drawn    Post Assessment:  Labs drawn without difficulty: Yes.    Discharge Plan:  Departure Mode: Ambulatory.    Face to Face Time: 10 min    Tricia Marshall CMA

## 2022-12-06 LAB
HAPTOGLOB SERPL-MCNC: <3 MG/DL (ref 32–197)
IGA SERPL-MCNC: <2 MG/DL (ref 84–499)
IGG SERPL-MCNC: 488 MG/DL (ref 610–1616)
IGM SERPL-MCNC: <10 MG/DL (ref 35–242)

## 2022-12-07 ENCOUNTER — HOSPITAL ENCOUNTER (OUTPATIENT)
Dept: PHYSICAL THERAPY | Facility: CLINIC | Age: 82
Setting detail: THERAPIES SERIES
Discharge: HOME | End: 2022-12-07
Attending: STUDENT IN AN ORGANIZED HEALTH CARE EDUCATION/TRAINING PROGRAM
Payer: MEDICARE

## 2022-12-07 PROCEDURE — 97750 PHYSICAL PERFORMANCE TEST: CPT | Mod: GP | Performed by: PHYSICAL THERAPIST

## 2022-12-07 PROCEDURE — 97110 THERAPEUTIC EXERCISES: CPT | Mod: GP | Performed by: PHYSICAL THERAPIST

## 2022-12-07 NOTE — PROGRESS NOTES
12/07/22 1200   Signing Clinician's Name / Credentials   Signing clinician's name / credentials Genny Daugherty PT   Alegria Balance Scale (ALEGRIA KAYLI, CARL S, LISHA NJ, LINDA B: MEASURING BALANCE IN THE ELDERLY: VALIDATION OF AN INSTRUMENT. CAN. J. PUB. HEALTH, JULY/AUGUST SUPPLEMENT 2:S7-11, 1992.)   Sit To Stand 3   Standing Unsupported 4   Sitting Unsupported 4   Stand to Sit 3   Transfers 3   Standing with Eyes Closed 4   Standing Unsupported, Feet Together 4   Reach Forward With Outstretched Arm 4   Retrieve Object From Floor 4   Turning to Look Behind 3   Turn 360 Degrees 2   Placing Alternate Foot on Stool (4-6 inches) 4   Unsupported Tandem Stand (Demonstrate to Subject) 3   One Leg Stand 1   Total Score (A score of 45 or less has been correlated with an increased risk of falls)   Total Score (out of 56) 46     Alegria Balance Scale (BBS) Cutoff Scores: A score of < 45 /56 indicates an increased risk for falls.     The BBS is a measure of static and dynamic standing balance that has been validated in community dwelling elderly individuals and individuals who have Parkinson's Disease, MS, and those who are s/p CVA and TBI. The test is administered without an assistive device. Scores from the Alegria are used to determine the probability of falling based on the patient's previous history of falls and their test performance.     Minimal Detectable Change = 6.5   & Minimal Detectable Change (Parkinson's Disease) = 5 according to Franky & Pradipey 2008    Assessment (rationale for performing, application to patient s function & care plan): assessment of progress, risk for falls.    (Minutes billed as physical performance test)18      This patient is a 89 year old with a lesion of concern.  I am seeing this patient at the request of Nadiya Giraldo MD for R neck mass.    R neck - this lesion has been present for approximately several  months.    Growth - rapidly expanding in size    Symptoms - none  Bleeding - no  Focused Past Medical History    - Prior skin cancer: none    General skin condition - excellent    Past Medical History:   Diagnosis Date   • Anemia     low erythropoitein. Other studies nl followed byDr Woods   • Esophageal reflux     stopped asa due to gastritits   • Herpes zoster without mention of complication    • Left atrial enlargement    • LVH (left ventricular hypertrophy)    • Memory loss     MMSE 24   • Mitral regurgitation     mild to moderate.   • Nonspecific abnormal results of pulmonary system function study     ILD   • Nontoxic multinodular goiter     Small thyroid nodules. STABLE   • Osteoporosis, unspecified    • Other and unspecified hyperlipidemia    • PAST MEDICAL HISTORY     Mu-ism, no blood transfusions   • Personal history of colonic polyps     Adenomas   • Personal history of colonic polyps 11/08/11    Dr. Crawford, path: 3- tubular adenoms, 1- hyperplastic polyps.  f/u 3 years   • Proteinuria    • Type II or unspecified type diabetes mellitus without mention of complication, not stated as uncontrolled    • Unspecified essential hypertension     White coat HTN     Past Surgical History:   Procedure Laterality Date   • Colonoscopy diagnostic  11/02    Nl fup 5 years   • Colonoscopy remove lesion by snare  6/3/99    Dr. Crawford, removal benign polyp   • Colonoscopy remove lesions hot biopsy forceps  08/12/08    Yvette,adenoma, f/u 2011   • Colonoscopy w biopsy  11/08/11    Dr. Crawford 3 tubular adenoma, 1 hyperplastic polyp.   • Colonoscopy w biopsy  1/23/2015    Tubular adenoma, Diverticulosis - Dr Yañez   • Duplex scan,carotid  2006    mild to mod ds, no sig stenosis   • Esophagogastroduodenoscopy  transoral flex w/bx single or mult  02/08/08    Gastritis. H.Pylori- negative. Dr. Hickey   • Esophagogastroduodenoscopy transoral flex w/bx single or mult  1/23/2015    H-pylori, Gastritis and Hiatal Hernia-- Dr Yañez   • Flexible sigmoidoscopy dx  2/99    Dr. Santa, sessile polyp of the rectum   • Laser surgery of eye  8/31/06    yag laser right eye   • Remv 2nd cataract,corn-scler sectn  7/21/05    Cataract removal, OD w/Dr. Polanco   • Remv cataract intracap,insert lens     • Total abdom hysterectomy  1978    ANNAMARIA w BSO       Review of Systems - recent illness or infections - No  Other skin concerns at this time - No    Focused Physical Examination  Well developed/ well nourished/ NAD  Psychiatric - oriented x 3 - Yes  Skin lesion-  - Location - R mid neck  - Size - 1.2 cm  - Color - flesh colored  - Shape - symmetrically round/oval  - Contour - raised  - Other - None   - Clinically consistent with cyst or mass    RECOMMENDATION - excision under local anesthesia    Surgical treatment discussed with the patient.  The nature of the surgical procedure was explained.  The approximate length and location of the resulting scar was discussed.  Risks were discussed including   - bleeding resulting in bruising   - infection resulting in delayed healing and a more prominent scar  - a permanent scar will result and it may be significant  - reaction to medications  All questions were answered.

## 2022-12-09 ENCOUNTER — INFUSION THERAPY VISIT (OUTPATIENT)
Dept: INFUSION THERAPY | Facility: CLINIC | Age: 82
End: 2022-12-09
Attending: INTERNAL MEDICINE
Payer: MEDICARE

## 2022-12-09 VITALS
OXYGEN SATURATION: 98 % | BODY MASS INDEX: 20.11 KG/M2 | TEMPERATURE: 98.1 F | SYSTOLIC BLOOD PRESSURE: 115 MMHG | RESPIRATION RATE: 20 BRPM | DIASTOLIC BLOOD PRESSURE: 71 MMHG | HEART RATE: 68 BPM | WEIGHT: 148.3 LBS

## 2022-12-09 DIAGNOSIS — D83.9 CVID (COMMON VARIABLE IMMUNODEFICIENCY) (H): ICD-10-CM

## 2022-12-09 DIAGNOSIS — D59.19 OTHER AUTOIMMUNE HEMOLYTIC ANEMIA (H): ICD-10-CM

## 2022-12-09 DIAGNOSIS — D59.10 AUTOIMMUNE HEMOLYTIC ANEMIA (H): ICD-10-CM

## 2022-12-09 DIAGNOSIS — D80.3 SELECTIVE DEFICIENCY OF IGG SUBCLASSES (H): Primary | ICD-10-CM

## 2022-12-09 PROCEDURE — 250N000011 HC RX IP 250 OP 636: Performed by: INTERNAL MEDICINE

## 2022-12-09 PROCEDURE — 96375 TX/PRO/DX INJ NEW DRUG ADDON: CPT

## 2022-12-09 PROCEDURE — 96365 THER/PROPH/DIAG IV INF INIT: CPT

## 2022-12-09 PROCEDURE — 96366 THER/PROPH/DIAG IV INF ADDON: CPT

## 2022-12-09 RX ORDER — NALOXONE HYDROCHLORIDE 0.4 MG/ML
0.2 INJECTION, SOLUTION INTRAMUSCULAR; INTRAVENOUS; SUBCUTANEOUS
Status: CANCELLED | OUTPATIENT
Start: 2022-12-22

## 2022-12-09 RX ORDER — DIPHENHYDRAMINE HCL 25 MG
50 CAPSULE ORAL ONCE
Status: CANCELLED
Start: 2022-12-22

## 2022-12-09 RX ORDER — HEPARIN SODIUM (PORCINE) LOCK FLUSH IV SOLN 100 UNIT/ML 100 UNIT/ML
5 SOLUTION INTRAVENOUS
Status: CANCELLED | OUTPATIENT
Start: 2022-12-22

## 2022-12-09 RX ORDER — MEPERIDINE HYDROCHLORIDE 25 MG/ML
25 INJECTION INTRAMUSCULAR; INTRAVENOUS; SUBCUTANEOUS EVERY 30 MIN PRN
Status: CANCELLED | OUTPATIENT
Start: 2022-12-22

## 2022-12-09 RX ORDER — ACETAMINOPHEN 325 MG/1
650 TABLET ORAL ONCE
Status: CANCELLED
Start: 2022-12-22

## 2022-12-09 RX ORDER — DIPHENHYDRAMINE HYDROCHLORIDE 50 MG/ML
50 INJECTION INTRAMUSCULAR; INTRAVENOUS
Status: CANCELLED
Start: 2022-12-22

## 2022-12-09 RX ORDER — HEPARIN SODIUM,PORCINE 10 UNIT/ML
5 VIAL (ML) INTRAVENOUS
Status: CANCELLED | OUTPATIENT
Start: 2022-12-22

## 2022-12-09 RX ORDER — ALBUTEROL SULFATE 0.83 MG/ML
2.5 SOLUTION RESPIRATORY (INHALATION)
Status: CANCELLED | OUTPATIENT
Start: 2022-12-22

## 2022-12-09 RX ORDER — METHYLPREDNISOLONE SODIUM SUCCINATE 125 MG/2ML
125 INJECTION, POWDER, LYOPHILIZED, FOR SOLUTION INTRAMUSCULAR; INTRAVENOUS
Status: CANCELLED
Start: 2022-12-22

## 2022-12-09 RX ORDER — ALBUTEROL SULFATE 90 UG/1
1-2 AEROSOL, METERED RESPIRATORY (INHALATION)
Status: CANCELLED
Start: 2022-12-22

## 2022-12-09 RX ORDER — EPINEPHRINE 1 MG/ML
0.3 INJECTION, SOLUTION INTRAMUSCULAR; SUBCUTANEOUS EVERY 5 MIN PRN
Status: CANCELLED | OUTPATIENT
Start: 2022-12-22

## 2022-12-09 RX ADMIN — HUMAN IMMUNOGLOBULIN G 35 G: 20 LIQUID INTRAVENOUS at 11:17

## 2022-12-09 RX ADMIN — HYDROCORTISONE SODIUM SUCCINATE 100 MG: 100 INJECTION, POWDER, FOR SOLUTION INTRAMUSCULAR; INTRAVENOUS at 11:13

## 2022-12-09 ASSESSMENT — PAIN SCALES - GENERAL: PAINLEVEL: NO PAIN (0)

## 2022-12-09 NOTE — PROGRESS NOTES
Infusion Nursing Note:  Tricia Goncalvesber presents today for IVIG.    Patient seen by provider today: No   present during visit today: Not Applicable.    Note: Infusion started at 0.5 ml/kg/hr. Increased by 0.5 ml/kg/hr every 15 minutes to max of 4 ml/kg/hr.    Intravenous Access:  Peripheral IV placed.    Treatment Conditions:  Biological Infusion Checklist:  ~~~ NOTE: If the patient answers yes to any of the questions below, hold the infusion and contact ordering provider or on-call provider.    1. Have you recently had an elevated temperature, fever, chills, productive cough, coughing for 3 weeks or longer or hemoptysis, abnormal vital signs, night sweats,  chest pain or have you noticed a decrease in your appetite, unexplained weight loss or fatigue? No  2. Do you have any open wounds or new incisions? No  3. Do you have any recent or upcoming hospitalizations, surgeries or dental procedures? No  4. Do you currently have or recently have had any signs of illness or infection or are you on any antibiotics? No  5. Have you had any new, sudden or worsening abdominal pain? No  6. Have you or anyone in your household received a live vaccination in the past 4 weeks? Please note:  No live vaccines while on biologic/chemotherapy until 6 months after the last treatment.  Patient can receive the flu vaccine (shot only) and the pneumovax.  It is optimal for the patient to get these vaccines mid cycle, but they can be given at any time as long as it is not on the day of the infusion. No  7. Have you recently been diagnosed with any new nervous system diseases (ie. Multiple sclerosis, Guillain [place], seizures, neurological changes) or cancer diagnosis? No  8. Are you on any form of radiation or chemotherapy? No  9. Are you pregnant or breast feeding or do you have plans of pregnancy in the future? No  10. Have you been having any signs of worsening depression or suicidal ideations?  (benlysta only) No  11. Have there  been any other new onset medical symptoms? No      Post Infusion Assessment:  Patient tolerated infusion without incident.  Blood return noted pre and post infusion.  Site patent and intact, free from redness, edema or discomfort.  No evidence of extravasations.  Access discontinued per protocol.  Biologic Infusion Post Education: Call the triage nurse at your clinic or seek medical attention if you have chills and/or temperature greater than or equal to 100.5, uncontrolled nausea/vomiting, diarrhea, constipation, dizziness, shortness of breath, chest pain, heart palpitations, weakness or any other new or concerning symptoms, questions or concerns.  You cannot have any live virus vaccines prior to or during treatment or up to 6 months post infusion.  If you have an upcoming surgery, medical procedure or dental procedure during treatment, this should be discussed with your ordering physician and your surgeon/dentist.  If you are having any concerning symptom, if you are unsure if you should get your next infusion or wish to speak to a provider before your next infusion, please call your care coordinator or triage nurse at your clinic to notify them so we can adequately serve you.     Discharge Plan:   AVS to patient via [employer].  Patient will return 1/6 for next appointment.   Patient discharged in stable condition accompanied by: self; has a ride home.  Departure Mode: Ambulatory.      Fadia Brown RN

## 2022-12-27 ENCOUNTER — HOSPITAL ENCOUNTER (OUTPATIENT)
Dept: PHYSICAL THERAPY | Facility: CLINIC | Age: 82
Setting detail: THERAPIES SERIES
Discharge: HOME | End: 2022-12-27
Attending: STUDENT IN AN ORGANIZED HEALTH CARE EDUCATION/TRAINING PROGRAM
Payer: MEDICARE

## 2022-12-27 PROCEDURE — 97110 THERAPEUTIC EXERCISES: CPT | Mod: GP | Performed by: PHYSICAL THERAPIST

## 2023-01-02 NOTE — PROGRESS NOTES
[facility]   Outpatient Sleep Medicine  Zachary 3, 2023       Name: Tricia Sosa MRN# [id number]   Age: 82 year old YOB: 1940            Assessment and Plan:   1. Mild obstructive sleep apnea  Patient's AHI elevated above goal today at 6.65 events per hour. Diagnostic AHI was 6.5. May be artificially elevated due to leak from mask. Currently using a full face mask as she can be a mouth breather at night but causing issues with dry mouth. Previously used nasal mask with chin strap but didn't tolerate chin strap. Discussed Biotene products or Xylimelts as an option for dry mouth if wanting to stick with current mask. Does use humidity. Also discussed could trial pillows or nasal mask again with chin strap or mouth tape to ensure keeps mouth closed but didn't seem very open to this option. Admits she hasn't changed out her mask in a number of months, leak could be from old mask. Tolerating pressures well. Prescription renewed today for new mask/supplies. Ultimately agreed to have her change out her full face mask for a new one to hopefully help with the leak. Will keep pressure settings as they are and will have STM check on her in 1-2 months to see if leak improved with new mask and hope that AHI goes back down as well. Assuming no issues after getting new mask will see her back in 1 year for annual or sooner as needed.   - Comprehensive DME       Chief Complaint      Chief Complaint   Patient presents with     Telephone Visit     Yearly follow-up          History of Present Illness:     Tricia Sosa is a 82 year old female who presents to the clinic for follow-up of their mild obstructive sleep apnea managed with CPAP.     Originally diagnosed via PSG on 11/3/2019 (144#, BMI 19.7) - AHI 6.5, RDI 7.6, Supine AHI 11.5, REM AHI 33.4, Low O2 84.7%, Time Spent ?88% 0 minutes / Time Spent ?89% 0.1 minutes. No PLM or abnormal movements. Sleep archtecture revealed all sleep stages present  "with normal arousal index of 12.1/hour.      Set up with CPAP on 9/11/2020 with a Víctor DreamStation machine. Received replacement Dreamstation 2 in September 2021 from recall.     Returns today for annual visit.  Patient is using a full face mask. The mask is comfortable but does leak. They are not snoring with the mask on. They are not having gasp arousals. They are having some issues with dry mouth, no nasal dryness or epistaxis. Used nasal mask with chin strap in the past and did not tolerate the chin strap, felt \"awkward\". They are not having pain/skin breakdown but does wake with strap marks. The pressure settings are comfortable.     Bedtime is typically 10-10:30PM. Usually it takes about 5-10 minutes to fall asleep with the mask on. Wake time is typically 6:00AM. Wakes 1-2 times a night for the restroom.     Respironics Auto-PAP 5.0 - 15.0 cmH2O 30 day usage data:    80% of days with > 4 hours of use. 1/30 days with no use.   Average use 332 minutes per day.   Average leak 41.24 LPM.  Average % of night in large leak 6%.    CPAP 90% pressure 9.2cm.   AHI 6.65 events per hour.    SCALES:   INSOMNIA: Insomnia Severity Score: 7   SLEEPINESS: [place] Sleepiness Score: 3    Past medical/surgical history, family history, social history, medications and allergies were reviewed.           Physical Examination:   LMP  (LMP Unknown)   General: Cooperative and pleasant. In no apparent distress.  Pulmonary:  Able to speak easily in full sentences. No cough or wheeze.   Neurologic: Alert, oriented x3.   Psychiatric: Mood euthymic. Affect congruent with full range and intensity.    CC:  Emily Church PA-C  Zachary 3, 2023     [facility]  [address] [place] [address]     [facility]  [address] [place] [id number]    Chart documentation was completed, in part, with [employer] voice-recognition software. Even though reviewed, some " grammatical, spelling, and word errors may remain.    54 minutes spent on day of encounter doing chart review, history and exam, documentation, and further activities as noted above

## 2023-01-03 ENCOUNTER — VIRTUAL VISIT (OUTPATIENT)
Dept: SLEEP MEDICINE | Facility: CLINIC | Age: 83
End: 2023-01-03
Payer: MEDICARE

## 2023-01-03 DIAGNOSIS — G47.33 MILD OBSTRUCTIVE SLEEP APNEA: ICD-10-CM

## 2023-01-03 PROCEDURE — 99443 PR PHYSICIAN TELEPHONE EVALUATION 21-30 MIN: CPT | Performed by: PHYSICIAN ASSISTANT

## 2023-01-03 ASSESSMENT — SLEEP AND FATIGUE QUESTIONNAIRES
HOW LIKELY ARE YOU TO NOD OFF OR FALL ASLEEP WHILE SITTING INACTIVE IN A PUBLIC PLACE: WOULD NEVER DOZE
HOW LIKELY ARE YOU TO NOD OFF OR FALL ASLEEP WHILE SITTING QUIETLY AFTER LUNCH WITHOUT ALCOHOL: WOULD NEVER DOZE
HOW LIKELY ARE YOU TO NOD OFF OR FALL ASLEEP WHEN YOU ARE A PASSENGER IN A CAR FOR AN HOUR WITHOUT A BREAK: SLIGHT CHANCE OF DOZING
HOW LIKELY ARE YOU TO NOD OFF OR FALL ASLEEP WHILE SITTING AND TALKING TO SOMEONE: WOULD NEVER DOZE
HOW LIKELY ARE YOU TO NOD OFF OR FALL ASLEEP WHILE LYING DOWN TO REST IN THE AFTERNOON WHEN CIRCUMSTANCES PERMIT: SLIGHT CHANCE OF DOZING
HOW LIKELY ARE YOU TO NOD OFF OR FALL ASLEEP WHILE WATCHING TV: SLIGHT CHANCE OF DOZING
HOW LIKELY ARE YOU TO NOD OFF OR FALL ASLEEP WHILE SITTING AND READING: WOULD NEVER DOZE
HOW LIKELY ARE YOU TO NOD OFF OR FALL ASLEEP IN A CAR, WHILE STOPPED FOR A FEW MINUTES IN TRAFFIC: WOULD NEVER DOZE

## 2023-01-03 NOTE — Clinical Note
Hi chip can you do me a favor and add this patient to the STM list and give her a call in 1-2 months, having some leaking from her mask that I think is elevating AHI, shes going to get a new mask and hoping you can check on her in a bit to see if leak and AHI go down with the new mask! Thanks!

## 2023-01-03 NOTE — PROGRESS NOTES
Tricia is a 82 year old who is being evaluated via a billable telephone visit.      What phone number would you like to be contacted at? [phone]    How would you like to obtain your AVS? Yoselinhart    Distant Location (provider location):  Off-site    Telephone call start time: 12:01PM  Telephone call end time: 12:29PM  Judith Solis PA-C

## 2023-01-06 ENCOUNTER — LAB (OUTPATIENT)
Dept: ONCOLOGY | Facility: CLINIC | Age: 83
End: 2023-01-06
Attending: PHYSICIAN ASSISTANT
Payer: MEDICARE

## 2023-01-06 VITALS
DIASTOLIC BLOOD PRESSURE: 66 MMHG | WEIGHT: 149.8 LBS | TEMPERATURE: 97.2 F | HEART RATE: 89 BPM | RESPIRATION RATE: 16 BRPM | OXYGEN SATURATION: 99 % | SYSTOLIC BLOOD PRESSURE: 128 MMHG | BODY MASS INDEX: 20.32 KG/M2

## 2023-01-06 DIAGNOSIS — D59.10 AUTOIMMUNE HEMOLYTIC ANEMIA (H): Primary | ICD-10-CM

## 2023-01-06 DIAGNOSIS — D83.9 CVID (COMMON VARIABLE IMMUNODEFICIENCY) (H): ICD-10-CM

## 2023-01-06 DIAGNOSIS — D50.9 IRON DEFICIENCY ANEMIA, UNSPECIFIED IRON DEFICIENCY ANEMIA TYPE: ICD-10-CM

## 2023-01-06 DIAGNOSIS — D59.10 AUTOIMMUNE HEMOLYTIC ANEMIA (H): ICD-10-CM

## 2023-01-06 LAB
ALBUMIN SERPL BCG-MCNC: 4.3 G/DL (ref 3.5–5.2)
ALP SERPL-CCNC: 129 U/L (ref 35–104)
ALT SERPL W P-5'-P-CCNC: 20 U/L (ref 10–35)
ANION GAP SERPL CALCULATED.3IONS-SCNC: 6 MMOL/L (ref 7–15)
AST SERPL W P-5'-P-CCNC: 30 U/L (ref 10–35)
BASOPHILS # BLD AUTO: 0 10E3/UL (ref 0–0.2)
BASOPHILS NFR BLD AUTO: 0 %
BILIRUB SERPL-MCNC: 0.4 MG/DL
BUN SERPL-MCNC: 15 MG/DL (ref 8–23)
CALCIUM SERPL-MCNC: 8.6 MG/DL (ref 8.8–10.2)
CHLORIDE SERPL-SCNC: 103 MMOL/L (ref 98–107)
CREAT SERPL-MCNC: 0.61 MG/DL (ref 0.51–0.95)
DEPRECATED HCO3 PLAS-SCNC: 29 MMOL/L (ref 22–29)
EOSINOPHIL # BLD AUTO: 0 10E3/UL (ref 0–0.7)
EOSINOPHIL NFR BLD AUTO: 1 %
ERYTHROCYTE [DISTWIDTH] IN BLOOD BY AUTOMATED COUNT: 13.9 % (ref 10–15)
FERRITIN SERPL-MCNC: 53 NG/ML (ref 11–328)
GFR SERPL CREATININE-BSD FRML MDRD: 89 ML/MIN/1.73M2
GLUCOSE SERPL-MCNC: 118 MG/DL (ref 70–99)
HCT VFR BLD AUTO: 35.6 % (ref 35–47)
HGB BLD-MCNC: 11.6 G/DL (ref 11.7–15.7)
IMM GRANULOCYTES # BLD: 0 10E3/UL
IMM GRANULOCYTES NFR BLD: 0 %
IRON BINDING CAPACITY (ROCHE): 309 UG/DL (ref 240–430)
IRON SATN MFR SERPL: 32 % (ref 15–46)
IRON SERPL-MCNC: 99 UG/DL (ref 37–145)
LDH SERPL L TO P-CCNC: 219 U/L (ref 0–250)
LYMPHOCYTES # BLD AUTO: 2.1 10E3/UL (ref 0.8–5.3)
LYMPHOCYTES NFR BLD AUTO: 37 %
MCH RBC QN AUTO: 34.3 PG (ref 26.5–33)
MCHC RBC AUTO-ENTMCNC: 32.6 G/DL (ref 31.5–36.5)
MCV RBC AUTO: 105 FL (ref 78–100)
MONOCYTES # BLD AUTO: 0.5 10E3/UL (ref 0–1.3)
MONOCYTES NFR BLD AUTO: 8 %
NEUTROPHILS # BLD AUTO: 3 10E3/UL (ref 1.6–8.3)
NEUTROPHILS NFR BLD AUTO: 54 %
NRBC # BLD AUTO: 0 10E3/UL
NRBC BLD AUTO-RTO: 0 /100
PLATELET # BLD AUTO: 134 10E3/UL (ref 150–450)
POTASSIUM SERPL-SCNC: 4.2 MMOL/L (ref 3.4–5.3)
PROT SERPL-MCNC: 6.2 G/DL (ref 6.4–8.3)
RBC # BLD AUTO: 3.38 10E6/UL (ref 3.8–5.2)
RETICS # AUTO: 0.07 10E6/UL (ref 0.03–0.1)
RETICS/RBC NFR AUTO: 2.2 % (ref 0.5–2)
SODIUM SERPL-SCNC: 138 MMOL/L (ref 136–145)
WBC # BLD AUTO: 5.6 10E3/UL (ref 4–11)

## 2023-01-06 PROCEDURE — 85025 COMPLETE CBC W/AUTO DIFF WBC: CPT | Performed by: INTERNAL MEDICINE

## 2023-01-06 PROCEDURE — 83615 LACTATE (LD) (LDH) ENZYME: CPT | Performed by: INTERNAL MEDICINE

## 2023-01-06 PROCEDURE — 80053 COMPREHEN METABOLIC PANEL: CPT | Performed by: INTERNAL MEDICINE

## 2023-01-06 PROCEDURE — 85045 AUTOMATED RETICULOCYTE COUNT: CPT | Performed by: INTERNAL MEDICINE

## 2023-01-06 PROCEDURE — 83550 IRON BINDING TEST: CPT | Performed by: INTERNAL MEDICINE

## 2023-01-06 PROCEDURE — G0463 HOSPITAL OUTPT CLINIC VISIT: HCPCS | Performed by: PHYSICIAN ASSISTANT

## 2023-01-06 PROCEDURE — 36415 COLL VENOUS BLD VENIPUNCTURE: CPT

## 2023-01-06 PROCEDURE — 82728 ASSAY OF FERRITIN: CPT | Performed by: INTERNAL MEDICINE

## 2023-01-06 PROCEDURE — 83010 ASSAY OF HAPTOGLOBIN QUANT: CPT | Performed by: INTERNAL MEDICINE

## 2023-01-06 PROCEDURE — 82784 ASSAY IGA/IGD/IGG/IGM EACH: CPT | Performed by: INTERNAL MEDICINE

## 2023-01-06 PROCEDURE — 99213 OFFICE O/P EST LOW 20 MIN: CPT | Performed by: PHYSICIAN ASSISTANT

## 2023-01-06 ASSESSMENT — PAIN SCALES - GENERAL: PAINLEVEL: NO PAIN (0)

## 2023-01-06 NOTE — NURSING NOTE
Ezio Fischer  Admission Date: 1/10/2022         Daily Progress Note: 1/19/2022    The patient's chart is reviewed and the patient is discussed with the staff. Background: Patient is a 79 y.o.  female seen and evaluated at the request of Dr. Yuly Haynes. She has a history of ESRD on HD, RCC s/p resection (2015) and recurrence requiring SBRT (2017), DM, and HTN. She was admitted 1/10 with left sided back pain. She had a CT chest performed which showed B pleural effusions (both small, R>L) as well as a 2.5cm right paratracheal mass vs lymph node. Of note, this was also seen on multiple past CT scans but appears more prominent now. She was seen by oncology who recommended thoracentesis for cytology if possible. If non diagnostic outpatient EBUS. Consult was just brought to our attention today. Patient is currently needing 2L O2. Had thoracentesis for 400 ml  1/12. Pt complains of ongoing pain in her back. She is tearful because of this throughout much of my encounter with her. Pleural fluid looks transudative. Cytology pending. PET scan ordered.     Subjective:       Awaiting rehab bed, will need home O2, walking around with walker  Cytology suspicious but not positive    Current Facility-Administered Medications   Medication Dose Route Frequency    senna-docusate (PERICOLACE) 8.6-50 mg per tablet 2 Tablet  2 Tablet Oral DAILY    midodrine (PROAMATINE) tablet 5 mg  5 mg Oral TID WITH MEALS    polyethylene glycol (MIRALAX) packet 17 g  17 g Oral BID    magnesium hydroxide (MILK OF MAGNESIA) 400 mg/5 mL oral suspension 30 mL  30 mL Oral DAILY PRN    bisacodyL (DULCOLAX) suppository 10 mg  10 mg Rectal DAILY PRN    B complex-vitamin C-folic acid (NEPHRO-ROBB) 0.8 mg tab  1 Tablet Oral DAILY    heparin (porcine) injection 5,000 Units  5,000 Units SubCUTAneous Q12H    epoetin mary lou-epbx (RETACRIT) injection 40,000 Units  40,000 Units SubCUTAneous Q7D    morphine injection 4 mg Oncology Rooming Note    January 6, 2023 3:36 PM   Tricia Sosa is a 82 year old female who presents for:    Chief Complaint   Patient presents with     Oncology Clinic Visit     Anemia, iron deficiency     Initial Vitals: /66   Pulse 89   Temp 97.2  F (36.2  C) (Tympanic)   Resp 16   Wt 67.9 kg (149 lb 12.8 oz)   LMP  (LMP Unknown)   SpO2 99%   BMI 20.32 kg/m   Estimated body mass index is 20.32 kg/m  as calculated from the following:    Height as of 9/20/22: 1.829 m (6').    Weight as of this encounter: 67.9 kg (149 lb 12.8 oz). Body surface area is 1.86 meters squared.  No Pain (0) Comment: Data Unavailable   No LMP recorded (lmp unknown). Patient is postmenopausal.  Allergies reviewed: Yes  Medications reviewed: Yes    Medications: Medication refills not needed today.  Pharmacy name entered into EPIC:    [place], [place] - [address] [id number] [place], [place] - [address] [facility]               4 mg IntraVENous Q6H PRN    [Held by provider] amLODIPine (NORVASC) tablet 10 mg  10 mg Oral QHS    gabapentin (NEURONTIN) capsule 100 mg  100 mg Oral TID    [Held by provider] lisinopriL (PRINIVIL, ZESTRIL) tablet 40 mg  40 mg Oral QHS    methocarbamoL (ROBAXIN) tablet 1,500 mg  1,500 mg Oral QID    [Held by provider] minoxidiL (LONITEN) tablet 10 mg  10 mg Oral BID    pantoprazole (PROTONIX) tablet 40 mg  40 mg Oral ACB    sevelamer carbonate (RENVELA) tab 800 mg  800 mg Oral TID WITH MEALS    sodium chloride (NS) flush 5-40 mL  5-40 mL IntraVENous Q8H    sodium chloride (NS) flush 5-40 mL  5-40 mL IntraVENous PRN    acetaminophen (TYLENOL) tablet 650 mg  650 mg Oral Q6H PRN    Or    acetaminophen (TYLENOL) suppository 650 mg  650 mg Rectal Q6H PRN    ondansetron (ZOFRAN ODT) tablet 4 mg  4 mg Oral Q8H PRN    Or    ondansetron (ZOFRAN) injection 4 mg  4 mg IntraVENous Q6H PRN    HYDROcodone-acetaminophen (NORCO) 7.5-325 mg per tablet 1 Tablet  1 Tablet Oral Q6H PRN    heparin (porcine) 1,000 unit/mL injection 5,000 Units  5,000 Units Hemodialysis DIALYSIS PRN    insulin lispro (HUMALOG) injection   SubCUTAneous AC&HS     Review of Systems    Constitutional: negative for fever, chills, sweats  Cardiovascular: negative for chest pain, palpitations, syncope, edema  Gastrointestinal:  negative for dysphagia, reflux, vomiting, diarrhea, abdominal pain, or melena  Neurologic:  negative for focal weakness, numbness, headache    Objective:     Vitals:    01/19/22 1000 01/19/22 1030 01/19/22 1100 01/19/22 1105   BP: (!) 116/52 (!) 125/55 (!) 124/58 122/60   Pulse: 67 67 66 66   Resp:       Temp:       SpO2:       Weight:       Height:           Intake/Output Summary (Last 24 hours) at 1/19/2022 1429  Last data filed at 1/19/2022 1105  Gross per 24 hour   Intake 75 ml   Output 3000 ml   Net -2925 ml     Physical Exam:   Constitution:  the patient is elderly  HEENT:  Sclera clear, pupils equal, oral mucosa moist  Respiratory: decreased BS on 2 lpm  Cardiovascular:  RRR without M,G,R  Gastrointestinal: firm abdomen, no pain; with positive bowel sounds. Musculoskeletal: warm without cyanosis. There is trace lower extremity edema. Skin:  no jaundice or rashes  Neurologic: no gross neuro deficits     Psychiatric:  alert and oriented x 3    Chest CT: 1/10/22: IMPRESSION  1. Bilateral pleural effusions with subjacent atelectasis.     2. The previously reported right paratracheal mass/lymph node is slightly  Larger. Pleural fluid  RARE CELLS SUSPICIOUS FOR MALIGNANCY. LAB:  Recent Labs     01/17/22  0307   WBC 7.8   HGB 8.3*   HCT 24.2*   *     Recent Labs     01/17/22  0307   *   K 4.8   CL 97*   CO2 31   GLU 98   BUN 44*   CREA 5.83*   MG 2.1   CA 9.4   PHOS 4.7*     No results for input(s): LAC, TROPHS, BNPNT, CRP in the last 72 hours. No lab exists for component: ESR  No results for input(s): PH, PCO2, PO2, HCO3, PHI, PCO2I, PO2I, HCO3I in the last 72 hours. No results for input(s): SDES, CULT in the last 72 hours. Assessment and Plan:  (Medical Decision Making)   Principal Problem:    Mediastinal mass (1/10/2022)    Will need OP EBUS guided Bx. ESRD (end stage renal disease) ([address] (2/7/2013)    Per nephrology      Abdominal mass (9/12/2017)    Per primary      Renal cell carcinoma ([address] (9/12/2017)    ? Mets to pleura and mediastinum. Severe back pain (11/12/2021)    Per primary      Acute respiratory failure with hypoxia ([address] (11/12/2021)    On 2L, assess RA sat      Bilateral pleural effusion (1/12/2022)  Cytology suspicious but not diagnostic, etc. Technically fluid was a transudate.  -non diagnostic, message sent for schedule for outpatient spot for EBUS following PET scan as outpatient. Needs home O2, Angie Perdomo from our office is arranging EBUS, PET scan ordered. Discharge plan per hospitalist    Respiratory status stable on Nc.   No further respiratory needs noted at this time. Will sign off of the treatment team.  Please call if we can be of further assistance. Thank you. Khushbu Diaz NP    Lungs: CTA bl  Heart S1 and S2 audible, no murmers or rubs appreciated  Other     Needs home O2 and office is arranging EBUS/PET. Will sign off for now. Call if needed. I have spoken with and examined the patient. I have reviewed the history, examination, assessment, and plan and agree with the above. Theodora Can MD      This note was signed electronically. Errors are unfortunately her likely due to dictation software.

## 2023-01-06 NOTE — PROGRESS NOTES
Oncology/Hematology Visit Note  Jan 6, 2023    Reason for Visit: Follow up of CVID, autoimmune hemolytic anemia, iron deficiency     HISTORY OF PRESENT ILLNESS: Tricia Sosa is a 82 year old female who presents with autoimmune hemolytic anemia and IgA deficiency.  She previously saw Dr. Matos and then Dr. Summers.  She was previously seen at [facility].     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  She was very sick at the time and had severe diarrhea.  She lost about 40 pounds in weight.  The workup revealed that she had markedly diminished CD4 counts of 48 and was diagnosed with CMV colitis.  Since then she has been followed in hematology clinic at [place] until recently.  She was noted to have anemia which was fairly long-standing.  She was initially referred for anemia in 2004 and also had a bone marrow biopsy done at that time which revealed hypercellular bone marrow with 70-80% cellularity and normal trilineage hematopoiesis and no morphologic features of MDS or lymphoproliferation.  Her anemia was attributed to her chronic underlying disease.  At the next evaluation in 2002 on 4 aggressive anemia there was no evidence of hemolysis, iron deficiency, B12 or folate deficiency.  Bone marrow biopsy was not pursued.  In summer of 2015 she had progressive fatigue, dyspnea on exertion and worsening anemia with a hemoglobin now of 9.  Workup at this time did suggest a hemolytic anemia with elevated LDH at 709, undetectable haptoglobin and elevated unconjugated bilirubin at 3.3.  Monospecific MARIKA was positive for both IgG and complement.  Cold agglutinin screen was positive with very low titer 1:64.  She was started on prednisone 60 mg daily with supplementation of iron folate and B12 starting 7/31/15.  Her prednisone has been slowly tapered and was discontinued off in January 2016.  She was last followed at [facility] on March 10, 2016 when she had stable hemoglobin.     She  was followed by Dr. Matos and Dr. Summers.  Due to relapse of hemolytic anemia, she underwent another course of prednisone that has since been tapered off and rituximab x 4 weekly doses completed in 9/19/19.     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  She started weekly Rituxan on 7/31/20 x 4 doses, completed on 8/21/20.  She tapered off of prednisone in October 2020.     Due to relapse of her AIHA, she started prednisone again on 6/8/21 at 60 mg daily with slow taper and finished taper in August 2021.     Due to relapse of AIHA again, she started prednisone again on 11/2/21 at 70 mg daily with slow taper.  She also completed weekly Rituxan again x 4 doses 12/6/21-12/29/21.     Venofer 5/5-5/19/22.    She is now on Rituxan every 2 months and IVIG monthly if IgG <600.    Interval History:  Tricia is overall doing quite well. She is still weak and working with PT on building her strength. She did have some dental work done. She denies any recent infections. No bleeding issues. Breathing improved.     Review of Systems:  Patient denies fevers, chills, night sweats, unexplained weight changes, headaches, dizziness, vision or hearing changes, new lumps or bumps, chest pain, shortness of breath, cough, abdominal pain, nausea, vomiting, changes to bowel or bladder, swelling of extremities, bleeding issues, or rash.    Current Outpatient Medications   Medication Sig Dispense Refill     cyanocobalamin (VITAMIN  B-12) 1000 MCG tablet   3     folic acid (FOLVITE) 1 MG tablet   3     levothyroxine (SYNTHROID/LEVOTHROID) 150 MCG tablet Take 1 tablet  by mouth daily 90 tablet 0     clobetasol (TEMOVATE) 0.05 % external solution Apply topically daily as needed (itch) (Patient not taking: Reported on 12/9/2022) 50 mL 0     Flaxseed, Linseed, (FLAX SEEDS PO) Take by mouth daily (Patient not taking: Reported on 12/9/2022)       hydrochlorothiazide (HYDRODIURIL) 12.5 MG  tablet Take 1 tablet (12.5 mg) by mouth daily as needed (edema) (Patient not taking: Reported on 12/9/2022) 30 tablet 5     ketoconazole (NIZORAL) 2 % external shampoo Use every other day to scalp as needed (Patient not taking: Reported on 12/9/2022) 120 mL 11       Past Medical History  Past Medical History:   Diagnosis Date     WARD (dyspnea on exertion)      External hemorrhoids without mention of complication 6/27/05     Hemolytic anemia (H)     autoimmune     Hypothyroidism      IgA deficiency (H)      Myopia of both eyes with astigmatism and presbyopia 8/16/2017     Other malaise and fatigue      Other severe protein-calorie malnutrition      Other vitreous opacities 12/19/2006     Thyroid disease      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    Hemorrhage Subdural Trauma Without LOC Active     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, and posterior vitreous detachment     Urinary tract infection, site not specified     Recurrent UTI's     Past Surgical History:   Procedure Laterality Date     COLONOSCOPY N/A 4/26/2016    Procedure: COLONOSCOPY;  Surgeon: Dontae Del Rio MD;  Location:  GI     ENT SURGERY  1985    Thyroid lobectomy     EYE SURGERY Bilateral 04/20/2021    Cataract Surgery     HC CYSTOSCOPY,INSERT URETERAL STENT      Ureteral stent insertion     HC FLEX SIGMOIDOSCOPY W BIOPSY  5/30/00      LAPAROSCOPY, SURGICAL; CHOLECYSTECTOMY  1992      THYROIDECTOMY       LIGATION OF HEMORRHOID(S)      Hemorrhoidectomy     UPPER GI ENDOSCOPY,BIOPSY  10/01/01     [facility] LIGATE FALLOPIAN TUBE       [facility] COLONOSCOPY W BIOPSY  4/26/00     [facility] COLONOSCOPY W BIOPSY  10/8/03     [facility]    REPEAT IN 5 YEARS     Allergies   Allergen Reactions     Doxycycline      Social History   Social History     Tobacco Use     Smoking status: Never     Smokeless tobacco: Never   Substance Use Topics     Alcohol use: Yes     Alcohol/week: 0.0 standard  drinks     Comment: 1 a day at most     Drug use: No      Past medical history and social history were reviewed.    Physical Examination:  /66   Pulse 89   Temp 97.2  F (36.2  C) (Tympanic)   Resp 16   Wt 67.9 kg (149 lb 12.8 oz)   LMP  (LMP Unknown)   SpO2 99%   BMI 20.32 kg/m    Wt Readings from Last 10 Encounters:   01/06/23 67.9 kg (149 lb 12.8 oz)   12/09/22 67.3 kg (148 lb 4.8 oz)   11/14/22 67.1 kg (148 lb)   10/14/22 66.9 kg (147 lb 6.4 oz)   09/20/22 67 kg (147 lb 12.8 oz)   08/19/22 69.4 kg (153 lb)   08/16/22 69.1 kg (152 lb 6.4 oz)   07/25/22 69.4 kg (152 lb 14.4 oz)   06/24/22 69.7 kg (153 lb 11.2 oz)   05/19/22 72.8 kg (160 lb 6.4 oz)     Constitutional: Well-appearing female in no acute distress.  Eyes: EOMI, PERRL. No scleral icterus.  ENT: Oral mucosa is moist without lesions or thrush.   Lymphatic: Neck is supple without cervical or supraclavicular lymphadenopathy.   Cardiovascular: Regular rate and rhythm. No murmurs, gallops, or rubs. No peripheral edema.  Respiratory: Clear to auscultation bilaterally. No wheezes or crackles.  Gastrointestinal: Bowel sounds present. Abdomen soft, non-tender. No palpable hepatosplenomegaly or masses.   Neurologic: Cranial nerves II through XII are grossly intact.  Skin: No rashes, petechiae, or bruising noted on exposed skin.    Laboratory Data:     Latest Reference Range & Units 01/06/23 15:23   Sodium 136 - 145 mmol/L 138   Potassium 3.4 - 5.3 mmol/L 4.2   Chloride 98 - 107 mmol/L 103   Carbon Dioxide (CO2) 22 - 29 mmol/L 29   Urea Nitrogen 8.0 - 23.0 mg/dL 15.0   Creatinine 0.51 - 0.95 mg/dL 0.61   GFR Estimate >60 mL/min/1.73m2 89   Calcium 8.8 - 10.2 mg/dL 8.6 (L)   Anion Gap 7 - 15 mmol/L 6 (L)   Albumin 3.5 - 5.2 g/dL 4.3   Protein Total 6.4 - 8.3 g/dL 6.2 (L)   Alkaline Phosphatase 35 - 104 U/L 129 (H)   ALT 10 - 35 U/L 20   AST 10 - 35 U/L 30   Bilirubin Total <=1.2 mg/dL 0.4   Ferritin 11 - 328 ng/mL 53   Glucose 70 - 99 mg/dL 118 (H)    Iron 37 - 145 ug/dL 99   Iron Binding Capacity 240 - 430 ug/dL 309   Iron Sat Index 15 - 46 % 32   Lactate Dehydrogenase 0 - 250 U/L 219   WBC 4.0 - 11.0 10e3/uL 5.6   Hemoglobin 11.7 - 15.7 g/dL 11.6 (L)   Hematocrit 35.0 - 47.0 % 35.6   Platelet Count 150 - 450 10e3/uL 134 (L)   RBC Count 3.80 - 5.20 10e6/uL 3.38 (L)   MCV 78 - 100 fL 105 (H)   MCH 26.5 - 33.0 pg 34.3 (H)   MCHC 31.5 - 36.5 g/dL 32.6   RDW 10.0 - 15.0 % 13.9   % Neutrophils % 54   % Lymphocytes % 37   % Monocytes % 8   % Eosinophils % 1   % Basophils % 0   Absolute Basophils 0.0 - 0.2 10e3/uL 0.0   Absolute Eosinophils 0.0 - 0.7 10e3/uL 0.0   Absolute Immature Granulocytes <=0.4 10e3/uL 0.0   Absolute Lymphocytes 0.8 - 5.3 10e3/uL 2.1   Absolute Monocytes 0.0 - 1.3 10e3/uL 0.5   % Immature Granulocytes % 0   Absolute Neutrophils 1.6 - 8.3 10e3/uL 3.0   Absolute NRBCs 10e3/uL 0.0   NRBCs per 100 WBC <1 /100 0   % Retic 0.5 - 2.0 % 2.2 (H)   Absolute Retic 0.025 - 0.095 10e6/uL 0.074   (L): Data is abnormally low  (H): Data is abnormally high      Assessment and Plan:  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 Has had multiple relapses, most recently November 2021. She completed prolonged prednisone taper and Rituxan. She is off prednisone now. Has bactrim when she is on prednisone.      Hgb is slightly lower today, however hemolysis labs stable (LDH WNL, bili WNL, absolute retic WNL, haptoglobin unchanged). Will review with Dr. Ryder and for now continue Rituxan every 2 months.      Continue monthly CBC monitoring. Thrombocytopenia waxing/wanning.      2. CVID  Follows with immunology as well. Doing IVIG monthly when IGG level is <600. Current level 568. Will plan on scheduling in the next week if possible. No recent infections.     3. EVI  Prior ferritin was low at 6 in April 2022. S/p Vneofer x 3. Ferritin now WNL at 53.     20 minutes spent on the date of the encounter doing chart review, review of test results,  interpretation of tests, patient visit and documentation     Addendum: IVIG orders were change to every 8 weeks which is reasonable as long as her levels on her off month are close to 600. Will get IVIG then early February as scheduled.     Isaiah Man PA-C  Department of Hematology and Oncology  [facility]

## 2023-01-06 NOTE — LETTER
1/6/2023         RE: Tricia Sosa  [id number]  [place] [id number]        Dear Colleague,    Thank you for referring your patient, Tricia Sosa, to the [facility] [place]. Please see a copy of my visit note below.    Oncology/Hematology Visit Note  Jan 6, 2023    Reason for Visit: Follow up of CVID, autoimmune hemolytic anemia, iron deficiency     HISTORY OF PRESENT ILLNESS: Tricia Sosa is a 82 year old female who presents with autoimmune hemolytic anemia and IgA deficiency.  She previously saw Dr. Matos and then Dr. Summers.  She was previously seen at [facility].     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  She was very sick at the time and had severe diarrhea.  She lost about 40 pounds in weight.  The workup revealed that she had markedly diminished CD4 counts of 48 and was diagnosed with CMV colitis.  Since then she has been followed in hematology clinic at [place] until recently.  She was noted to have anemia which was fairly long-standing.  She was initially referred for anemia in 2004 and also had a bone marrow biopsy done at that time which revealed hypercellular bone marrow with 70-80% cellularity and normal trilineage hematopoiesis and no morphologic features of MDS or lymphoproliferation.  Her anemia was attributed to her chronic underlying disease.  At the next evaluation in 2002 on 4 aggressive anemia there was no evidence of hemolysis, iron deficiency, B12 or folate deficiency.  Bone marrow biopsy was not pursued.  In summer of 2015 she had progressive fatigue, dyspnea on exertion and worsening anemia with a hemoglobin now of 9.  Workup at this time did suggest a hemolytic anemia with elevated LDH at 709, undetectable haptoglobin and elevated unconjugated bilirubin at 3.3.  Monospecific MARIKA was positive for both IgG and complement.  Cold agglutinin screen was positive with very low titer 1:64.  She was started on  prednisone 60 mg daily with supplementation of iron folate and B12 starting 7/31/15.  Her prednisone has been slowly tapered and was discontinued off in January 2016.  She was last followed at [facility] on March 10, 2016 when she had stable hemoglobin.     She was followed by Dr. Matos and Dr. Summers.  Due to relapse of hemolytic anemia, she underwent another course of prednisone that has since been tapered off and rituximab x 4 weekly doses completed in 9/19/19.     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  She started weekly Rituxan on 7/31/20 x 4 doses, completed on 8/21/20.  She tapered off of prednisone in October 2020.     Due to relapse of her AIHA, she started prednisone again on 6/8/21 at 60 mg daily with slow taper and finished taper in August 2021.     Due to relapse of AIHA again, she started prednisone again on 11/2/21 at 70 mg daily with slow taper.  She also completed weekly Rituxan again x 4 doses 12/6/21-12/29/21.     Venofer 5/5-5/19/22.    She is now on Rituxan every 2 months and IVIG monthly if IgG <600.    Interval History:  Tricia is overall doing quite well. She is still weak and working with PT on building her strength. She did have some dental work done. She denies any recent infections. No bleeding issues. Breathing improved.     Review of Systems:  Patient denies fevers, chills, night sweats, unexplained weight changes, headaches, dizziness, vision or hearing changes, new lumps or bumps, chest pain, shortness of breath, cough, abdominal pain, nausea, vomiting, changes to bowel or bladder, swelling of extremities, bleeding issues, or rash.    Current Outpatient Medications   Medication Sig Dispense Refill     cyanocobalamin (VITAMIN  B-12) 1000 MCG tablet   3     folic acid (FOLVITE) 1 MG tablet   3     levothyroxine (SYNTHROID/LEVOTHROID) 150 MCG tablet Take 1 tablet  by mouth daily 90 tablet 0     clobetasol (TEMOVATE) 0.05 %  external solution Apply topically daily as needed (itch) (Patient not taking: Reported on 12/9/2022) 50 mL 0     Flaxseed, Linseed, (FLAX SEEDS PO) Take by mouth daily (Patient not taking: Reported on 12/9/2022)       hydrochlorothiazide (HYDRODIURIL) 12.5 MG tablet Take 1 tablet (12.5 mg) by mouth daily as needed (edema) (Patient not taking: Reported on 12/9/2022) 30 tablet 5     ketoconazole (NIZORAL) 2 % external shampoo Use every other day to scalp as needed (Patient not taking: Reported on 12/9/2022) 120 mL 11       Past Medical History  Past Medical History:   Diagnosis Date     WARD (dyspnea on exertion)      External hemorrhoids without mention of complication 6/27/05     Hemolytic anemia (H)     autoimmune     Hypothyroidism      IgA deficiency (H)      Myopia of both eyes with astigmatism and presbyopia 8/16/2017     Other malaise and fatigue      Other severe protein-calorie malnutrition      Other vitreous opacities 12/19/2006     Thyroid disease      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    Hemorrhage Subdural Trauma Without LOC Active     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, and posterior vitreous detachment     Urinary tract infection, site not specified     Recurrent UTI's     Past Surgical History:   Procedure Laterality Date     COLONOSCOPY N/A 4/26/2016    Procedure: COLONOSCOPY;  Surgeon: Dontae Del Rio MD;  Location:  GI     ENT SURGERY  1985    Thyroid lobectomy     EYE SURGERY Bilateral 04/20/2021    Cataract Surgery      CYSTOSCOPY,INSERT URETERAL STENT      Ureteral stent insertion     HC FLEX SIGMOIDOSCOPY W BIOPSY  5/30/00      LAPAROSCOPY, SURGICAL; CHOLECYSTECTOMY  1992      THYROIDECTOMY       LIGATION OF HEMORRHOID(S)      Hemorrhoidectomy     UPPER GI ENDOSCOPY,BIOPSY  10/01/01     [facility] LIGATE FALLOPIAN TUBE       [facility] COLONOS[facility] BIOPSY  4/26/00     [facility] COLONOSCOPY W BIOPSY  10/8/03     ZZ COLONOSCOPY W BIOPSY  6/27/05     REPEAT IN 5 YEARS     Allergies   Allergen Reactions     Doxycycline      Social History   Social History     Tobacco Use     Smoking status: Never     Smokeless tobacco: Never   Substance Use Topics     Alcohol use: Yes     Alcohol/week: 0.0 standard drinks     Comment: 1 a day at most     Drug use: No      Past medical history and social history were reviewed.    Physical Examination:  /66   Pulse 89   Temp 97.2  F (36.2  C) (Tympanic)   Resp 16   Wt 67.9 kg (149 lb 12.8 oz)   LMP  (LMP Unknown)   SpO2 99%   BMI 20.32 kg/m    Wt Readings from Last 10 Encounters:   01/06/23 67.9 kg (149 lb 12.8 oz)   12/09/22 67.3 kg (148 lb 4.8 oz)   11/14/22 67.1 kg (148 lb)   10/14/22 66.9 kg (147 lb 6.4 oz)   09/20/22 67 kg (147 lb 12.8 oz)   08/19/22 69.4 kg (153 lb)   08/16/22 69.1 kg (152 lb 6.4 oz)   07/25/22 69.4 kg (152 lb 14.4 oz)   06/24/22 69.7 kg (153 lb 11.2 oz)   05/19/22 72.8 kg (160 lb 6.4 oz)     Constitutional: Well-appearing female in no acute distress.  Eyes: EOMI, PERRL. No scleral icterus.  ENT: Oral mucosa is moist without lesions or thrush.   Lymphatic: Neck is supple without cervical or supraclavicular lymphadenopathy.   Cardiovascular: Regular rate and rhythm. No murmurs, gallops, or rubs. No peripheral edema.  Respiratory: Clear to auscultation bilaterally. No wheezes or crackles.  Gastrointestinal: Bowel sounds present. Abdomen soft, non-tender. No palpable hepatosplenomegaly or masses.   Neurologic: Cranial nerves II through XII are grossly intact.  Skin: No rashes, petechiae, or bruising noted on exposed skin.    Laboratory Data:     Latest Reference Range & Units 01/06/23 15:23   Sodium 136 - 145 mmol/L 138   Potassium 3.4 - 5.3 mmol/L 4.2   Chloride 98 - 107 mmol/L 103   Carbon Dioxide (CO2) 22 - 29 mmol/L 29   Urea Nitrogen 8.0 - 23.0 mg/dL 15.0   Creatinine 0.51 - 0.95 mg/dL 0.61   GFR Estimate >60 mL/min/1.73m2 89   Calcium 8.8 - 10.2 mg/dL 8.6 (L)   Anion Gap 7 - 15 mmol/L 6 (L)    Albumin 3.5 - 5.2 g/dL 4.3   Protein Total 6.4 - 8.3 g/dL 6.2 (L)   Alkaline Phosphatase 35 - 104 U/L 129 (H)   ALT 10 - 35 U/L 20   AST 10 - 35 U/L 30   Bilirubin Total <=1.2 mg/dL 0.4   Ferritin 11 - 328 ng/mL 53   Glucose 70 - 99 mg/dL 118 (H)   Iron 37 - 145 ug/dL 99   Iron Binding Capacity 240 - 430 ug/dL 309   Iron Sat Index 15 - 46 % 32   Lactate Dehydrogenase 0 - 250 U/L 219   WBC 4.0 - 11.0 10e3/uL 5.6   Hemoglobin 11.7 - 15.7 g/dL 11.6 (L)   Hematocrit 35.0 - 47.0 % 35.6   Platelet Count 150 - 450 10e3/uL 134 (L)   RBC Count 3.80 - 5.20 10e6/uL 3.38 (L)   MCV 78 - 100 fL 105 (H)   MCH 26.5 - 33.0 pg 34.3 (H)   MCHC 31.5 - 36.5 g/dL 32.6   RDW 10.0 - 15.0 % 13.9   % Neutrophils % 54   % Lymphocytes % 37   % Monocytes % 8   % Eosinophils % 1   % Basophils % 0   Absolute Basophils 0.0 - 0.2 10e3/uL 0.0   Absolute Eosinophils 0.0 - 0.7 10e3/uL 0.0   Absolute Immature Granulocytes <=0.4 10e3/uL 0.0   Absolute Lymphocytes 0.8 - 5.3 10e3/uL 2.1   Absolute Monocytes 0.0 - 1.3 10e3/uL 0.5   % Immature Granulocytes % 0   Absolute Neutrophils 1.6 - 8.3 10e3/uL 3.0   Absolute NRBCs 10e3/uL 0.0   NRBCs per 100 WBC <1 /100 0   % Retic 0.5 - 2.0 % 2.2 (H)   Absolute Retic 0.025 - 0.095 10e6/uL 0.074   (L): Data is abnormally low  (H): Data is abnormally high      Assessment and Plan:  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 Has had multiple relapses, most recently November 2021. She completed prolonged prednisone taper and Rituxan. She is off prednisone now. Has bactrim when she is on prednisone.      Hgb is slightly lower today, however hemolysis labs stable (LDH WNL, bili WNL, absolute retic WNL, haptoglobin unchanged). Will review with Dr. Ryder and for now continue Rituxan every 2 months.      Continue monthly CBC monitoring. Thrombocytopenia waxing/wanning.      2. CVID  Follows with immunology as well. Doing IVIG monthly when IGG level is <600. Current level 568. Will plan on  scheduling in the next week if possible. No recent infections.     3. EVI  Prior ferritin was low at 6 in April 2022. S/p Vneofer x 3. Ferritin now WNL at 53.     20 minutes spent on the date of the encounter doing chart review, review of test results, interpretation of tests, patient visit and documentation     Isaiah Man PA-C  Department of Hematology and Oncology  [facility]         Again, thank you for allowing me to participate in the care of your patient.        Sincerely,        CHELSEA Marlow

## 2023-01-09 ENCOUNTER — INFUSION THERAPY VISIT (OUTPATIENT)
Dept: INFUSION THERAPY | Facility: CLINIC | Age: 83
End: 2023-01-09
Attending: INTERNAL MEDICINE
Payer: MEDICARE

## 2023-01-09 VITALS
TEMPERATURE: 98 F | SYSTOLIC BLOOD PRESSURE: 128 MMHG | DIASTOLIC BLOOD PRESSURE: 70 MMHG | RESPIRATION RATE: 16 BRPM | OXYGEN SATURATION: 98 % | HEART RATE: 89 BPM

## 2023-01-09 DIAGNOSIS — D83.9 CVID (COMMON VARIABLE IMMUNODEFICIENCY) (H): Primary | ICD-10-CM

## 2023-01-09 DIAGNOSIS — D59.19 OTHER AUTOIMMUNE HEMOLYTIC ANEMIA (H): ICD-10-CM

## 2023-01-09 LAB
HAPTOGLOB SERPL-MCNC: <3 MG/DL (ref 32–197)
IGA SERPL-MCNC: <2 MG/DL (ref 84–499)
IGG SERPL-MCNC: 568 MG/DL (ref 610–1616)
IGM SERPL-MCNC: <10 MG/DL (ref 35–242)

## 2023-01-09 PROCEDURE — 96413 CHEMO IV INFUSION 1 HR: CPT

## 2023-01-09 PROCEDURE — 258N000003 HC RX IP 258 OP 636: Performed by: INTERNAL MEDICINE

## 2023-01-09 PROCEDURE — 250N000011 HC RX IP 250 OP 636: Performed by: INTERNAL MEDICINE

## 2023-01-09 PROCEDURE — 250N000013 HC RX MED GY IP 250 OP 250 PS 637: Performed by: INTERNAL MEDICINE

## 2023-01-09 PROCEDURE — 96415 CHEMO IV INFUSION ADDL HR: CPT

## 2023-01-09 RX ORDER — DIPHENHYDRAMINE HCL 25 MG
25 CAPSULE ORAL ONCE
Status: COMPLETED | OUTPATIENT
Start: 2023-01-09 | End: 2023-01-09

## 2023-01-09 RX ORDER — ACETAMINOPHEN 325 MG/1
650 TABLET ORAL ONCE
Status: COMPLETED | OUTPATIENT
Start: 2023-01-09 | End: 2023-01-09

## 2023-01-09 RX ADMIN — RITUXIMAB-ABBS 700 MG: 10 INJECTION, SOLUTION INTRAVENOUS at 13:19

## 2023-01-09 RX ADMIN — ACETAMINOPHEN 650 MG: 325 TABLET ORAL at 12:54

## 2023-01-09 RX ADMIN — DIPHENHYDRAMINE HYDROCHLORIDE 25 MG: 25 CAPSULE ORAL at 12:54

## 2023-01-09 RX ADMIN — SODIUM CHLORIDE 250 ML: 9 INJECTION, SOLUTION INTRAVENOUS at 13:17

## 2023-01-09 ASSESSMENT — PAIN SCALES - GENERAL: PAINLEVEL: NO PAIN (0)

## 2023-01-09 NOTE — PROGRESS NOTES
Infusion Nursing Note:  Tricia Sosa presents today for Cycle 4 Day 1 Rapid Rituxan.    Patient seen by provider today: No   present during visit today: Not Applicable.    Note: Evaluated by Isaiah Man PA-C Friday with labs.  Tricia reports no changes since that visit.    Rituxan infused at 200ml/hr x 30 minutes,then 400ml/hr for remainder.    Intravenous Access:  Peripheral IV placed.    Treatment Conditions:  Lab Results   Component Value Date    HGB 11.6 (L) 01/06/2023    WBC 5.6 01/06/2023    ANEU 6.8 07/06/2021    ANEUTAUTO 3.0 01/06/2023     (L) 01/06/2023      Lab Results   Component Value Date     01/06/2023    POTASSIUM 4.2 01/06/2023    MAG 2.00 06/08/2000    CR 0.61 01/06/2023    CULLEN 8.6 (L) 01/06/2023    BILITOTAL 0.4 01/06/2023    ALBUMIN 4.3 01/06/2023    ALT 20 01/06/2023    AST 30 01/06/2023     Results reviewed, labs MET treatment parameters, ok to proceed with treatment.    Post Infusion Assessment:  Patient tolerated infusion without incident.  Blood return noted pre and post infusion.  Site patent and intact, free from redness, edema or discomfort.  No evidence of extravasations.  Access discontinued per protocol.     Discharge Plan:   AVS to patient via MYCHART.  Patient will return 2/6/23 labs, 2/10/23 IVIG and then in 2 months for labs/Dr. Ryder/ [place] which is not yet scheduled, for next appointment.   Patient discharged in stable condition accompanied by: self.  Departure Mode: Ambulatory with cane.  Face to Face time: 0.      Esther Colmenares RN

## 2023-01-10 ENCOUNTER — PATIENT OUTREACH (OUTPATIENT)
Dept: ONCOLOGY | Facility: CLINIC | Age: 83
End: 2023-01-10

## 2023-01-10 ENCOUNTER — DOCUMENTATION ONLY (OUTPATIENT)
Dept: SLEEP MEDICINE | Facility: CLINIC | Age: 83
End: 2023-01-10
Payer: MEDICARE

## 2023-01-10 ENCOUNTER — HOSPITAL ENCOUNTER (OUTPATIENT)
Dept: PHYSICAL THERAPY | Facility: CLINIC | Age: 83
Setting detail: THERAPIES SERIES
Discharge: HOME | End: 2023-01-10
Attending: STUDENT IN AN ORGANIZED HEALTH CARE EDUCATION/TRAINING PROGRAM
Payer: MEDICARE

## 2023-01-10 PROCEDURE — 97110 THERAPEUTIC EXERCISES: CPT | Mod: GP | Performed by: PHYSICAL THERAPIST

## 2023-01-10 NOTE — PROGRESS NOTES
[facility]    Outpatient Physical Therapy Discharge Note  Patient: Tricia Sosa  : 1940    Beginning/End Dates of Reporting Period:  2022 to 1/10/2023    Referring Provider: Antonio Nash MD    Therapy Diagnosis: decreased functional activity tolerance and independence, s/s L BPPV     Client Self Report: feels very t ired when having to walk into medical appointments.  harder than walking her dog at home. States she uses straight cane with walking dog and quad cane to appointments.  Also walks slower with dog.    Objective Measurements:        Objective Measure: Gaby (22)  Details: 46/56                     Goals:  Goal Identifier HEP   Goal Description Tricia will be independent in a home ex and activity program to address her impairments and functional limitations.   Target Date 23   Date Met  01/10/23   Progress (detail required for progress note): 1/10/2023 - states she is doing the ex mostly every day but sometimes not all of them.   - in progress.  modifications and progressions continue.     Goal Identifier Gait Tolerance   Goal Description Tricia will be able to walk 300 feet with her cane to demonstrate increased tolerance for community mobility for attending MD appointments and shopping tasks.   Target Date 23   Date Met  01/10/23   Progress (detail required for progress note): 8/9 - not walking very far, gets tired, can't stand upright very long.  22:  able to walk 350 feet with quad cane for first 175 feet, then with single end cane.  at home walking dog at slower pace for 10 minutes.  1/10/23 - tolerated 350 feet with single end cane.     Goal Identifier Position changes   Goal Description Patient will deny dizziness with change of body position for independent bed mobility and transfers for return to daily activities without limitation.   Target Date  02/01/23   Date Met  01/10/23   Progress (detail required for progress note): 1/10/2023 - reports not signs/sx of vertigo.  11/16/22: dizziness, not spinning, with sitting up in bed. no dizziness with rolling. able to perform transfers throughout the rest of the day without dizziness.     Goal Identifier Griffin   Goal Description Mavis will be able to score 46/56 or greater on the Griffin to demonstrate low falls risk with daily activities.   Target Date 02/01/23   Date Met  12/07/22   Progress (detail required for progress note): 8/9 - not able to test today d/t time.  will test at next session.  12/7/22 - 46/56     Goal Identifier DHI   Goal Description Patient will complete the DHI with a score of <30/100 to demonstrate decreased perception of handicap and improved quality of life.   Target Date 02/01/23   Date Met  11/16/22   Progress (detail required for progress note): goal met. 11/16/22: 4/100. 11/4/22: 64/100       Plan:  Discharge from therapy.    Discharge:    Reason for Discharge: Patient has met all goals.  She is appropriate to cont HEP independently.     Equipment Issued: theraband    Discharge Plan: Patient to continue home program.

## 2023-01-10 NOTE — PROGRESS NOTES
STM Recheck:  Spoke with patient she was leaving to go to an appointment.  She's still waiting to get a new mask. We agreed that we would talk next week about CPAP.

## 2023-01-10 NOTE — PROGRESS NOTES
Writer contacted Tricia to discuss her recent Immunoglobulin G level and the need for IVIG. She is scheduled for IVIG on 01/16/23. Awaiting call back.     Latest Reference Range & Units 01/06/23 15:23    - 1,616 mg/dL 568 (L)   (L): Data is abnormally low     rBandee Bowman RN on 1/10/2023 at 2:44 PM

## 2023-01-11 NOTE — PROGRESS NOTES
Isaiah Man PA  You;  Cancer Clinic Rn Pool 22 minutes ago (2:02 PM)     AW  No IVIG in January. Keep February and the every 60 days       Brandee Bowman RN on 1/11/2023 at 2:25 PM

## 2023-01-11 NOTE — PROGRESS NOTES
Writer spoke to Tricia to discuss Isaiah' recommendations. Tricia verbalized understanding. She is scheduled in February for another lab draw and possible IVIG.    Brandee Bowman RN on 1/11/2023 at 3:35 PM

## 2023-01-18 ENCOUNTER — DOCUMENTATION ONLY (OUTPATIENT)
Dept: SLEEP MEDICINE | Facility: CLINIC | Age: 83
End: 2023-01-18
Payer: MEDICARE

## 2023-01-18 NOTE — PROGRESS NOTES
STM Recheck:  Spoke with patient her mask leak seems to have improved she is still waiting to get her new mask from [facility].  Patient will call once she gets her new mask.

## 2023-02-01 NOTE — NURSING NOTE
DME orders have been automatically faxed to [facility]. 1 year appointment reminder will be sent via My Chart.   Trena Walker CMA

## 2023-02-06 ENCOUNTER — LAB (OUTPATIENT)
Dept: ONCOLOGY | Facility: CLINIC | Age: 83
End: 2023-02-06
Attending: INTERNAL MEDICINE
Payer: MEDICARE

## 2023-02-06 DIAGNOSIS — D59.10 AUTOIMMUNE HEMOLYTIC ANEMIA (H): ICD-10-CM

## 2023-02-06 LAB
ALBUMIN SERPL BCG-MCNC: 4.3 G/DL (ref 3.5–5.2)
ALP SERPL-CCNC: 134 U/L (ref 35–104)
ALT SERPL W P-5'-P-CCNC: 15 U/L (ref 10–35)
ANION GAP SERPL CALCULATED.3IONS-SCNC: 9 MMOL/L (ref 7–15)
AST SERPL W P-5'-P-CCNC: 35 U/L (ref 10–35)
BASOPHILS # BLD AUTO: 0 10E3/UL (ref 0–0.2)
BASOPHILS NFR BLD AUTO: 0 %
BILIRUB SERPL-MCNC: 0.6 MG/DL
BUN SERPL-MCNC: 14.2 MG/DL (ref 8–23)
CALCIUM SERPL-MCNC: 8.7 MG/DL (ref 8.8–10.2)
CHLORIDE SERPL-SCNC: 104 MMOL/L (ref 98–107)
CREAT SERPL-MCNC: 0.54 MG/DL (ref 0.51–0.95)
DEPRECATED HCO3 PLAS-SCNC: 27 MMOL/L (ref 22–29)
EOSINOPHIL # BLD AUTO: 0 10E3/UL (ref 0–0.7)
EOSINOPHIL NFR BLD AUTO: 0 %
ERYTHROCYTE [DISTWIDTH] IN BLOOD BY AUTOMATED COUNT: 14.1 % (ref 10–15)
GFR SERPL CREATININE-BSD FRML MDRD: >90 ML/MIN/1.73M2
GLUCOSE SERPL-MCNC: 81 MG/DL (ref 70–99)
HCT VFR BLD AUTO: 35.4 % (ref 35–47)
HGB BLD-MCNC: 12.1 G/DL (ref 11.7–15.7)
IMM GRANULOCYTES # BLD: 0 10E3/UL
IMM GRANULOCYTES NFR BLD: 0 %
LDH SERPL L TO P-CCNC: 281 U/L (ref 0–250)
LYMPHOCYTES # BLD AUTO: 1.9 10E3/UL (ref 0.8–5.3)
LYMPHOCYTES NFR BLD AUTO: 38 %
MCH RBC QN AUTO: 35.2 PG (ref 26.5–33)
MCHC RBC AUTO-ENTMCNC: 34.2 G/DL (ref 31.5–36.5)
MCV RBC AUTO: 103 FL (ref 78–100)
MONOCYTES # BLD AUTO: 0.5 10E3/UL (ref 0–1.3)
MONOCYTES NFR BLD AUTO: 10 %
NEUTROPHILS # BLD AUTO: 2.6 10E3/UL (ref 1.6–8.3)
NEUTROPHILS NFR BLD AUTO: 52 %
NRBC # BLD AUTO: 0 10E3/UL
NRBC BLD AUTO-RTO: 0 /100
PLATELET # BLD AUTO: 150 10E3/UL (ref 150–450)
POTASSIUM SERPL-SCNC: 4.3 MMOL/L (ref 3.4–5.3)
PROT SERPL-MCNC: 5.8 G/DL (ref 6.4–8.3)
RBC # BLD AUTO: 3.44 10E6/UL (ref 3.8–5.2)
RETICS # AUTO: 0.08 10E6/UL (ref 0.03–0.1)
RETICS/RBC NFR AUTO: 2.4 % (ref 0.5–2)
SODIUM SERPL-SCNC: 140 MMOL/L (ref 136–145)
WBC # BLD AUTO: 5 10E3/UL (ref 4–11)

## 2023-02-06 PROCEDURE — 83615 LACTATE (LD) (LDH) ENZYME: CPT | Performed by: INTERNAL MEDICINE

## 2023-02-06 PROCEDURE — 80053 COMPREHEN METABOLIC PANEL: CPT | Performed by: INTERNAL MEDICINE

## 2023-02-06 PROCEDURE — 83010 ASSAY OF HAPTOGLOBIN QUANT: CPT | Performed by: INTERNAL MEDICINE

## 2023-02-06 PROCEDURE — 85045 AUTOMATED RETICULOCYTE COUNT: CPT | Performed by: INTERNAL MEDICINE

## 2023-02-06 PROCEDURE — 82784 ASSAY IGA/IGD/IGG/IGM EACH: CPT | Performed by: INTERNAL MEDICINE

## 2023-02-06 PROCEDURE — 85025 COMPLETE CBC W/AUTO DIFF WBC: CPT | Performed by: INTERNAL MEDICINE

## 2023-02-06 PROCEDURE — 36415 COLL VENOUS BLD VENIPUNCTURE: CPT

## 2023-02-07 LAB
HAPTOGLOB SERPL-MCNC: <3 MG/DL (ref 32–197)
IGA SERPL-MCNC: <2 MG/DL (ref 84–499)
IGG SERPL-MCNC: 357 MG/DL (ref 610–1616)
IGM SERPL-MCNC: <10 MG/DL (ref 35–242)

## 2023-02-10 ENCOUNTER — INFUSION THERAPY VISIT (OUTPATIENT)
Dept: INFUSION THERAPY | Facility: CLINIC | Age: 83
End: 2023-02-10
Attending: INTERNAL MEDICINE
Payer: MEDICARE

## 2023-02-10 VITALS
HEART RATE: 65 BPM | BODY MASS INDEX: 20.66 KG/M2 | OXYGEN SATURATION: 99 % | TEMPERATURE: 98.4 F | DIASTOLIC BLOOD PRESSURE: 72 MMHG | SYSTOLIC BLOOD PRESSURE: 133 MMHG | WEIGHT: 152.3 LBS | RESPIRATION RATE: 16 BRPM

## 2023-02-10 DIAGNOSIS — D59.10 AUTOIMMUNE HEMOLYTIC ANEMIA (H): ICD-10-CM

## 2023-02-10 DIAGNOSIS — D59.19 OTHER AUTOIMMUNE HEMOLYTIC ANEMIA (H): ICD-10-CM

## 2023-02-10 DIAGNOSIS — D80.3 SELECTIVE DEFICIENCY OF IGG SUBCLASSES (H): Primary | ICD-10-CM

## 2023-02-10 DIAGNOSIS — D83.9 CVID (COMMON VARIABLE IMMUNODEFICIENCY) (H): ICD-10-CM

## 2023-02-10 PROCEDURE — 96365 THER/PROPH/DIAG IV INF INIT: CPT

## 2023-02-10 PROCEDURE — 96366 THER/PROPH/DIAG IV INF ADDON: CPT

## 2023-02-10 PROCEDURE — 96375 TX/PRO/DX INJ NEW DRUG ADDON: CPT

## 2023-02-10 PROCEDURE — 250N000011 HC RX IP 250 OP 636: Performed by: INTERNAL MEDICINE

## 2023-02-10 RX ORDER — NALOXONE HYDROCHLORIDE 0.4 MG/ML
0.2 INJECTION, SOLUTION INTRAMUSCULAR; INTRAVENOUS; SUBCUTANEOUS
Status: CANCELLED | OUTPATIENT
Start: 2023-02-20

## 2023-02-10 RX ORDER — DIPHENHYDRAMINE HYDROCHLORIDE 50 MG/ML
50 INJECTION INTRAMUSCULAR; INTRAVENOUS
Status: CANCELLED
Start: 2023-02-20

## 2023-02-10 RX ORDER — METHYLPREDNISOLONE SODIUM SUCCINATE 125 MG/2ML
125 INJECTION, POWDER, LYOPHILIZED, FOR SOLUTION INTRAMUSCULAR; INTRAVENOUS
Status: CANCELLED
Start: 2023-02-20

## 2023-02-10 RX ORDER — EPINEPHRINE 1 MG/ML
0.3 INJECTION, SOLUTION INTRAMUSCULAR; SUBCUTANEOUS EVERY 5 MIN PRN
Status: CANCELLED | OUTPATIENT
Start: 2023-02-20

## 2023-02-10 RX ORDER — HEPARIN SODIUM,PORCINE 10 UNIT/ML
5 VIAL (ML) INTRAVENOUS
Status: CANCELLED | OUTPATIENT
Start: 2023-02-20

## 2023-02-10 RX ORDER — ACETAMINOPHEN 325 MG/1
650 TABLET ORAL ONCE
Status: CANCELLED
Start: 2023-02-20

## 2023-02-10 RX ORDER — ALBUTEROL SULFATE 0.83 MG/ML
2.5 SOLUTION RESPIRATORY (INHALATION)
Status: CANCELLED | OUTPATIENT
Start: 2023-02-20

## 2023-02-10 RX ORDER — DIPHENHYDRAMINE HCL 25 MG
50 CAPSULE ORAL ONCE
Status: CANCELLED
Start: 2023-02-20

## 2023-02-10 RX ORDER — MEPERIDINE HYDROCHLORIDE 25 MG/ML
25 INJECTION INTRAMUSCULAR; INTRAVENOUS; SUBCUTANEOUS EVERY 30 MIN PRN
Status: CANCELLED | OUTPATIENT
Start: 2023-02-20

## 2023-02-10 RX ORDER — HEPARIN SODIUM (PORCINE) LOCK FLUSH IV SOLN 100 UNIT/ML 100 UNIT/ML
5 SOLUTION INTRAVENOUS
Status: CANCELLED | OUTPATIENT
Start: 2023-02-20

## 2023-02-10 RX ORDER — ALBUTEROL SULFATE 90 UG/1
1-2 AEROSOL, METERED RESPIRATORY (INHALATION)
Status: CANCELLED
Start: 2023-02-20

## 2023-02-10 RX ADMIN — HUMAN IMMUNOGLOBULIN G 35 G: 20 LIQUID INTRAVENOUS at 12:29

## 2023-02-10 RX ADMIN — HYDROCORTISONE SODIUM SUCCINATE 100 MG: 100 INJECTION, POWDER, FOR SOLUTION INTRAMUSCULAR; INTRAVENOUS at 12:26

## 2023-02-10 NOTE — PROGRESS NOTES
Infusion Nursing Note:  Tricia Sosa presents today for IVIG.    Patient seen by provider today: No   present during visit today: Not Applicable.    Note: Patient tolerated infusion initiated at 0.5ml/kg/hr, increased by 0.5ml/kg/hr every 15 minutes, with a max rate of 4ml/kg/hr.    Intravenous Access:  Peripheral IV placed.    Treatment Conditions:  Biological Infusion Checklist:  ~~~ NOTE: If the patient answers yes to any of the questions below, hold the infusion and contact ordering provider or on-call provider.    1. Have you recently had an elevated temperature, fever, chills, productive cough, coughing for 3 weeks or longer or hemoptysis, abnormal vital signs, night sweats,  chest pain or have you noticed a decrease in your appetite, unexplained weight loss or fatigue? No  2. Do you have any open wounds or new incisions? No  3. Do you have any recent or upcoming hospitalizations, surgeries or dental procedures? Yes, crown on 2/13, ok to proceed today, per Dr Ryder.   4. Do you currently have or recently have had any signs of illness or infection or are you on any antibiotics? No  5. Have you had any new, sudden or worsening abdominal pain? No  6. Have you or anyone in your household received a live vaccination in the past 4 weeks? Please note:  No live vaccines while on biologic/chemotherapy until 6 months after the last treatment.  Patient can receive the flu vaccine (shot only) and the pneumovax.  It is optimal for the patient to get these vaccines mid cycle, but they can be given at any time as long as it is not on the day of the infusion. No  7. Have you recently been diagnosed with any new nervous system diseases (ie. Multiple sclerosis, Guillain Whipple, seizures, neurological changes) or cancer diagnosis? No  8. Are you on any form of radiation or chemotherapy? No  9. Are you pregnant or breast feeding or do you have plans of pregnancy in the future? No  10. Have you been having any signs of  worsening depression or suicidal ideations?  (benlysta only) No  11. Have there been any other new onset medical symptoms? No      Post Infusion Assessment:  Patient tolerated infusion without incident.  Blood return noted pre and post infusion.  Site patent and intact, free from redness, edema or discomfort.  No evidence of extravasations.  Access discontinued per protocol.     Discharge Plan:   Discharge instructions reviewed with: Patient.  Patient and/or family verbalized understanding of discharge instructions and all questions answered.  AVS to patient via [employer].  Patient will return 3/7 for next appointment.   Patient discharged in stable condition accompanied by: self.  Departure Mode: Ambulatory.      Pari Griffin RN

## 2023-02-23 ENCOUNTER — DOCUMENTATION ONLY (OUTPATIENT)
Dept: SLEEP MEDICINE | Facility: CLINIC | Age: 83
End: 2023-02-23
Payer: MEDICARE

## 2023-03-03 ENCOUNTER — DOCUMENTATION ONLY (OUTPATIENT)
Dept: SLEEP MEDICINE | Facility: CLINIC | Age: 83
End: 2023-03-03
Payer: MEDICARE

## 2023-03-03 NOTE — PROGRESS NOTES
STM Recheck:  Patient called and stated her CPAP machine starts at 9 cm each night instead of the pressures 5-15 cm H20. Patient hasn't used CPAP the last week because she no heated humidity.

## 2023-03-05 DIAGNOSIS — D83.9 CVID (COMMON VARIABLE IMMUNODEFICIENCY) (H): ICD-10-CM

## 2023-03-05 DIAGNOSIS — D59.19 OTHER AUTOIMMUNE HEMOLYTIC ANEMIA (H): Primary | ICD-10-CM

## 2023-03-05 RX ORDER — ALBUTEROL SULFATE 0.83 MG/ML
2.5 SOLUTION RESPIRATORY (INHALATION)
Status: CANCELLED | OUTPATIENT
Start: 2023-03-07

## 2023-03-05 RX ORDER — HEPARIN SODIUM (PORCINE) LOCK FLUSH IV SOLN 100 UNIT/ML 100 UNIT/ML
5 SOLUTION INTRAVENOUS
Status: CANCELLED | OUTPATIENT
Start: 2023-03-07

## 2023-03-05 RX ORDER — DIPHENHYDRAMINE HYDROCHLORIDE 50 MG/ML
50 INJECTION INTRAMUSCULAR; INTRAVENOUS
Status: CANCELLED
Start: 2023-03-07

## 2023-03-05 RX ORDER — HEPARIN SODIUM,PORCINE 10 UNIT/ML
5 VIAL (ML) INTRAVENOUS
Status: CANCELLED | OUTPATIENT
Start: 2023-03-07

## 2023-03-05 RX ORDER — MEPERIDINE HYDROCHLORIDE 25 MG/ML
25 INJECTION INTRAMUSCULAR; INTRAVENOUS; SUBCUTANEOUS EVERY 30 MIN PRN
Status: CANCELLED | OUTPATIENT
Start: 2023-03-07

## 2023-03-05 RX ORDER — MEPERIDINE HYDROCHLORIDE 25 MG/ML
25 INJECTION INTRAMUSCULAR; INTRAVENOUS; SUBCUTANEOUS
Status: CANCELLED
Start: 2023-03-07

## 2023-03-05 RX ORDER — NALOXONE HYDROCHLORIDE 0.4 MG/ML
0.2 INJECTION, SOLUTION INTRAMUSCULAR; INTRAVENOUS; SUBCUTANEOUS
Status: CANCELLED | OUTPATIENT
Start: 2023-03-07

## 2023-03-05 RX ORDER — DIPHENHYDRAMINE HCL 50 MG
50 CAPSULE ORAL ONCE
Status: CANCELLED
Start: 2023-03-07

## 2023-03-05 RX ORDER — ACETAMINOPHEN 325 MG/1
650 TABLET ORAL ONCE
Status: CANCELLED
Start: 2023-03-07

## 2023-03-05 RX ORDER — EPINEPHRINE 1 MG/ML
0.3 INJECTION, SOLUTION, CONCENTRATE INTRAVENOUS EVERY 5 MIN PRN
Status: CANCELLED | OUTPATIENT
Start: 2023-03-07

## 2023-03-05 RX ORDER — ALBUTEROL SULFATE 90 UG/1
1-2 AEROSOL, METERED RESPIRATORY (INHALATION)
Status: CANCELLED
Start: 2023-03-07

## 2023-03-07 ENCOUNTER — LAB (OUTPATIENT)
Dept: INFUSION THERAPY | Facility: CLINIC | Age: 83
End: 2023-03-07
Attending: INTERNAL MEDICINE
Payer: MEDICARE

## 2023-03-07 ENCOUNTER — ONCOLOGY VISIT (OUTPATIENT)
Dept: ONCOLOGY | Facility: CLINIC | Age: 83
End: 2023-03-07
Attending: INTERNAL MEDICINE
Payer: MEDICARE

## 2023-03-07 VITALS
HEART RATE: 60 BPM | SYSTOLIC BLOOD PRESSURE: 134 MMHG | TEMPERATURE: 97.7 F | OXYGEN SATURATION: 96 % | DIASTOLIC BLOOD PRESSURE: 72 MMHG | RESPIRATION RATE: 18 BRPM | BODY MASS INDEX: 20.45 KG/M2 | HEIGHT: 72 IN | WEIGHT: 151 LBS

## 2023-03-07 DIAGNOSIS — D59.19 OTHER AUTOIMMUNE HEMOLYTIC ANEMIA (H): Primary | ICD-10-CM

## 2023-03-07 DIAGNOSIS — D83.9 CVID (COMMON VARIABLE IMMUNODEFICIENCY) (H): ICD-10-CM

## 2023-03-07 DIAGNOSIS — D59.10 AUTOIMMUNE HEMOLYTIC ANEMIA (H): ICD-10-CM

## 2023-03-07 LAB
ALBUMIN SERPL BCG-MCNC: 4.5 G/DL (ref 3.5–5.2)
ALP SERPL-CCNC: 136 U/L (ref 35–104)
ALT SERPL W P-5'-P-CCNC: 18 U/L (ref 10–35)
ANION GAP SERPL CALCULATED.3IONS-SCNC: 7 MMOL/L (ref 7–15)
AST SERPL W P-5'-P-CCNC: 28 U/L (ref 10–35)
BASOPHILS # BLD AUTO: 0 10E3/UL (ref 0–0.2)
BASOPHILS NFR BLD AUTO: 0 %
BILIRUB SERPL-MCNC: 0.8 MG/DL
BUN SERPL-MCNC: 14.9 MG/DL (ref 8–23)
CALCIUM SERPL-MCNC: 8.6 MG/DL (ref 8.8–10.2)
CHLORIDE SERPL-SCNC: 103 MMOL/L (ref 98–107)
CREAT SERPL-MCNC: 0.56 MG/DL (ref 0.51–0.95)
DEPRECATED HCO3 PLAS-SCNC: 27 MMOL/L (ref 22–29)
EOSINOPHIL # BLD AUTO: 0 10E3/UL (ref 0–0.7)
EOSINOPHIL NFR BLD AUTO: 0 %
ERYTHROCYTE [DISTWIDTH] IN BLOOD BY AUTOMATED COUNT: 14.1 % (ref 10–15)
GFR SERPL CREATININE-BSD FRML MDRD: >90 ML/MIN/1.73M2
GLUCOSE SERPL-MCNC: 90 MG/DL (ref 70–99)
HCT VFR BLD AUTO: 34.5 % (ref 35–47)
HGB BLD-MCNC: 12 G/DL (ref 11.7–15.7)
IMM GRANULOCYTES # BLD: 0 10E3/UL
IMM GRANULOCYTES NFR BLD: 0 %
LDH SERPL L TO P-CCNC: 231 U/L (ref 0–250)
LYMPHOCYTES # BLD AUTO: 3.6 10E3/UL (ref 0.8–5.3)
LYMPHOCYTES NFR BLD AUTO: 58 %
MCH RBC QN AUTO: 35.3 PG (ref 26.5–33)
MCHC RBC AUTO-ENTMCNC: 34.8 G/DL (ref 31.5–36.5)
MCV RBC AUTO: 102 FL (ref 78–100)
MONOCYTES # BLD AUTO: 0.6 10E3/UL (ref 0–1.3)
MONOCYTES NFR BLD AUTO: 9 %
NEUTROPHILS # BLD AUTO: 2.1 10E3/UL (ref 1.6–8.3)
NEUTROPHILS NFR BLD AUTO: 33 %
NRBC # BLD AUTO: 0 10E3/UL
NRBC BLD AUTO-RTO: 0 /100
PLATELET # BLD AUTO: 143 10E3/UL (ref 150–450)
POTASSIUM SERPL-SCNC: 4.4 MMOL/L (ref 3.4–5.3)
PROT SERPL-MCNC: 6.1 G/DL (ref 6.4–8.3)
RBC # BLD AUTO: 3.4 10E6/UL (ref 3.8–5.2)
RETICS # AUTO: 0.08 10E6/UL (ref 0.03–0.1)
RETICS/RBC NFR AUTO: 2.2 % (ref 0.5–2)
SODIUM SERPL-SCNC: 137 MMOL/L (ref 136–145)
WBC # BLD AUTO: 6.4 10E3/UL (ref 4–11)

## 2023-03-07 PROCEDURE — 99214 OFFICE O/P EST MOD 30 MIN: CPT | Performed by: INTERNAL MEDICINE

## 2023-03-07 PROCEDURE — 250N000011 HC RX IP 250 OP 636: Performed by: INTERNAL MEDICINE

## 2023-03-07 PROCEDURE — 36415 COLL VENOUS BLD VENIPUNCTURE: CPT

## 2023-03-07 PROCEDURE — 83010 ASSAY OF HAPTOGLOBIN QUANT: CPT | Performed by: INTERNAL MEDICINE

## 2023-03-07 PROCEDURE — 80053 COMPREHEN METABOLIC PANEL: CPT | Performed by: INTERNAL MEDICINE

## 2023-03-07 PROCEDURE — 82784 ASSAY IGA/IGD/IGG/IGM EACH: CPT | Performed by: INTERNAL MEDICINE

## 2023-03-07 PROCEDURE — 96415 CHEMO IV INFUSION ADDL HR: CPT

## 2023-03-07 PROCEDURE — 85045 AUTOMATED RETICULOCYTE COUNT: CPT | Performed by: INTERNAL MEDICINE

## 2023-03-07 PROCEDURE — 85018 HEMOGLOBIN: CPT | Performed by: INTERNAL MEDICINE

## 2023-03-07 PROCEDURE — 96413 CHEMO IV INFUSION 1 HR: CPT

## 2023-03-07 PROCEDURE — G0463 HOSPITAL OUTPT CLINIC VISIT: HCPCS | Mod: 25 | Performed by: INTERNAL MEDICINE

## 2023-03-07 PROCEDURE — 83615 LACTATE (LD) (LDH) ENZYME: CPT | Performed by: INTERNAL MEDICINE

## 2023-03-07 PROCEDURE — 250N000013 HC RX MED GY IP 250 OP 250 PS 637: Performed by: INTERNAL MEDICINE

## 2023-03-07 PROCEDURE — 258N000003 HC RX IP 258 OP 636: Performed by: INTERNAL MEDICINE

## 2023-03-07 RX ORDER — DIPHENHYDRAMINE HCL 25 MG
50 CAPSULE ORAL ONCE
Status: CANCELLED
Start: 2023-05-02

## 2023-03-07 RX ORDER — NALOXONE HYDROCHLORIDE 0.4 MG/ML
0.2 INJECTION, SOLUTION INTRAMUSCULAR; INTRAVENOUS; SUBCUTANEOUS
Status: CANCELLED | OUTPATIENT
Start: 2023-05-02

## 2023-03-07 RX ORDER — ALBUTEROL SULFATE 0.83 MG/ML
2.5 SOLUTION RESPIRATORY (INHALATION)
Status: CANCELLED | OUTPATIENT
Start: 2023-05-02

## 2023-03-07 RX ORDER — METHYLPREDNISOLONE SODIUM SUCCINATE 125 MG/2ML
125 INJECTION, POWDER, LYOPHILIZED, FOR SOLUTION INTRAMUSCULAR; INTRAVENOUS
Status: CANCELLED
Start: 2023-05-02

## 2023-03-07 RX ORDER — HEPARIN SODIUM (PORCINE) LOCK FLUSH IV SOLN 100 UNIT/ML 100 UNIT/ML
5 SOLUTION INTRAVENOUS
Status: CANCELLED | OUTPATIENT
Start: 2023-05-02

## 2023-03-07 RX ORDER — ACETAMINOPHEN 325 MG/1
650 TABLET ORAL ONCE
Status: CANCELLED
Start: 2023-05-02

## 2023-03-07 RX ORDER — ALBUTEROL SULFATE 90 UG/1
1-2 AEROSOL, METERED RESPIRATORY (INHALATION)
Status: CANCELLED
Start: 2023-05-02

## 2023-03-07 RX ORDER — ACETAMINOPHEN 325 MG/1
650 TABLET ORAL ONCE
Status: COMPLETED | OUTPATIENT
Start: 2023-03-07 | End: 2023-03-07

## 2023-03-07 RX ORDER — EPINEPHRINE 1 MG/ML
0.3 INJECTION, SOLUTION INTRAMUSCULAR; SUBCUTANEOUS EVERY 5 MIN PRN
Status: CANCELLED | OUTPATIENT
Start: 2023-05-02

## 2023-03-07 RX ORDER — DIPHENHYDRAMINE HCL 25 MG
50 CAPSULE ORAL ONCE
Status: COMPLETED | OUTPATIENT
Start: 2023-03-07 | End: 2023-03-07

## 2023-03-07 RX ORDER — DIPHENHYDRAMINE HYDROCHLORIDE 50 MG/ML
50 INJECTION INTRAMUSCULAR; INTRAVENOUS
Status: CANCELLED
Start: 2023-05-02

## 2023-03-07 RX ORDER — MEPERIDINE HYDROCHLORIDE 25 MG/ML
25 INJECTION INTRAMUSCULAR; INTRAVENOUS; SUBCUTANEOUS EVERY 30 MIN PRN
Status: CANCELLED | OUTPATIENT
Start: 2023-05-02

## 2023-03-07 RX ORDER — HEPARIN SODIUM,PORCINE 10 UNIT/ML
5 VIAL (ML) INTRAVENOUS
Status: CANCELLED | OUTPATIENT
Start: 2023-05-02

## 2023-03-07 RX ORDER — MEPERIDINE HYDROCHLORIDE 25 MG/ML
25 INJECTION INTRAMUSCULAR; INTRAVENOUS; SUBCUTANEOUS
Status: CANCELLED
Start: 2023-05-02

## 2023-03-07 RX ADMIN — DIPHENHYDRAMINE HYDROCHLORIDE 25 MG: 25 CAPSULE ORAL at 13:51

## 2023-03-07 RX ADMIN — ACETAMINOPHEN 650 MG: 325 TABLET ORAL at 13:51

## 2023-03-07 RX ADMIN — RITUXIMAB-ABBS 700 MG: 10 INJECTION, SOLUTION INTRAVENOUS at 14:11

## 2023-03-07 RX ADMIN — SODIUM CHLORIDE 250 ML: 9 INJECTION, SOLUTION INTRAVENOUS at 13:54

## 2023-03-07 ASSESSMENT — PAIN SCALES - GENERAL: PAINLEVEL: NO PAIN (0)

## 2023-03-07 NOTE — PROGRESS NOTES
[facility]    Hematology/Oncology Established Patient Follow-up Note    Treatment Summary:      Today's Date: 3/7/23    Reason for Follow-up: Hemolytic anemia-autoimmune; IgA deficiency     HISTORY OF PRESENT ILLNESS: Tricia Sosa is an 82 year old female who presents with autoimmune hemolytic anemia and IgA deficiency.  She previously saw Dr. Matos and then Dr. Summers.  She was previously seen at [facility].     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  She was very sick at the time and had severe diarrhea.  She lost about 40 pounds in weight.  The workup revealed that she had markedly diminished CD4 counts of 48 and was diagnosed with CMV colitis.  Since then she has been followed in hematology clinic at [place] until recently.  She was noted to have anemia which was fairly long-standing.  She was initially referred for anemia in 2004 and also had a bone marrow biopsy done at that time which revealed hypercellular bone marrow with 70-80% cellularity and normal trilineage hematopoiesis and no morphologic features of MDS or lymphoproliferation.  Her anemia was attributed to her chronic underlying disease.  At the next evaluation in 2002 on 4 aggressive anemia there was no evidence of hemolysis, iron deficiency, B12 or folate deficiency.  Bone marrow biopsy was not pursued.  In summer of 2015 she had progressive fatigue, dyspnea on exertion and worsening anemia with a hemoglobin now of 9.  Workup at this time did suggest a hemolytic anemia with elevated LDH at 709, undetectable haptoglobin and elevated unconjugated bilirubin at 3.3.  Monospecific MARIKA was positive for both IgG and complement.  Cold agglutinin screen was positive with very low titer 1:64.  She was started on prednisone 60 mg daily with supplementation of iron folate and B12 starting 7/31/15.  Her prednisone has been slowly tapered and was discontinued off in January 2016.  She was last  followed at [facility] on March 10, 2016 when she had stable hemoglobin.     She was followed by Dr. Matos and Dr. Summers.  Due to relapse of hemolytic anemia, she underwent another course of prednisone that has since been tapered off and rituximab x 4 weekly doses completed in 9/19/19.     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  She started weekly Rituxan on 7/31/20 x 4 doses, completed on 8/21/20.  She tapered off of prednisone in October 2020.     Due to relapse of her AIHA, she started prednisone again on 6/8/21 at 60 mg daily with slow taper and finished taper in August 2021.     Due to relapse of AIHA again, she started prednisone again on 11/2/21 at 70 mg daily with slow taper.  She also completed weekly Rituxan again x 4 doses 12/6/21-12/29/21.    Venofer 5/5-5/19/22.      INTERIM HISTORY:  Patient continues to do well.  She continues rituximab on maintenance every 2 months. She continues on IVIG as well.    She has been working with physical therapy. She has had vertigo from time to time; she states this has been chronic since the 2010's. No weight loss, fatigue, LAD, night sweats, fever. No gross evidence of bleed.         REVIEW OF SYSTEMS:   A 14 point ROS was reviewed with pertinent positives and negatives in the HPI.       HOME MEDICATIONS:  Current Outpatient Medications   Medication Sig Dispense Refill     cyanocobalamin (VITAMIN  B-12) 1000 MCG tablet   3     folic acid (FOLVITE) 1 MG tablet   3     levothyroxine (SYNTHROID/LEVOTHROID) 150 MCG tablet Take 1 tablet  by mouth daily 90 tablet 0         ALLERGIES:  Allergies   Allergen Reactions     Doxycycline          PAST MEDICAL HISTORY:  Past Medical History:   Diagnosis Date     WARD (dyspnea on exertion)      External hemorrhoids without mention of complication 6/27/05     Hemolytic anemia (H)     autoimmune     Hypothyroidism      IgA deficiency (H)      Myopia of both eyes with  astigmatism and presbyopia 8/16/2017     Other malaise and fatigue      Other severe protein-calorie malnutrition      Other vitreous opacities 12/19/2006     Thyroid disease      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    Hemorrhage Subdural Trauma Without LOC Active     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, and posterior vitreous detachment     Urinary tract infection, site not specified     Recurrent UTI's         PAST SURGICAL HISTORY:  Past Surgical History:   Procedure Laterality Date     COLONOSCOPY N/A 4/26/2016    Procedure: COLONOSCOPY;  Surgeon: Dontae Del Rio MD;  Location:  GI     ENT SURGERY  1985    Thyroid lobectomy     EYE SURGERY Bilateral 04/20/2021    Cataract Surgery     HC CYSTOSCOPY,INSERT URETERAL STENT      Ureteral stent insertion     HC FLEX SIGMOIDOSCOPY W BIOPSY  5/30/00     HC LAPAROSCOPY, SURGICAL; CHOLECYSTECTOMY  1992      THYROIDECTOMY       LIGATION OF HEMORRHOID(S)      Hemorrhoidectomy     UPPER GI ENDOSCOPY,BIOPSY  10/01/01     ZZC LIGATE FALLOPIAN TUBE       ZZHC COLONOSCOPY W BIOPSY  4/26/00     ZZHC COLONOSCOPY W BIOPSY  10/8/03     ZZHC COLONOSCOPY W BIOPSY  6/27/05    REPEAT IN 5 YEARS         SOCIAL HISTORY:  Social History     Socioeconomic History     Marital status:      Spouse name: Not on file     Number of children: Not on file     Years of education: Not on file     Highest education level: Not on file   Occupational History     Not on file   Tobacco Use     Smoking status: Never     Smokeless tobacco: Never   Substance and Sexual Activity     Alcohol use: Yes     Alcohol/week: 0.0 standard drinks     Comment: 1 a day at most     Drug use: No     Sexual activity: Not Currently     Partners: Male   Other Topics Concern     Parent/sibling w/ CABG, MI or angioplasty before 65F 55M? No   Social History Narrative     Not on file     Social Determinants of Health     Financial Resource Strain: Not on file    Food Insecurity: Not on file   Transportation Needs: Not on file   Physical Activity: Not on file   Stress: Not on file   Social Connections: Not on file   Intimate Partner Violence: Not At Risk     Fear of Current or Ex-Partner: No     Emotionally Abused: No     Physically Abused: No     Sexually Abused: No   Housing Stability: Not on file         FAMILY HISTORY:  Family History   Problem Relation Age of Onset     Colon Cancer Mother 90     Cerebrovascular Disease Father          at age 85     Dementia Brother      Prostate Cancer Brother      Thyroid Disease Brother      Dementia Brother      Thyroid Disease Brother      Pancreatic Cancer Brother      Breast Cancer Sister      Hyperlipidemia Sister      Depression Sister      Anxiety Disorder Sister      Thyroid Disease Sister      Breast Cancer Sister      Colon Cancer Niece      Other Cancer Niece         leukemia         PHYSICAL EXAM:  /72 (Cuff Size: Adult Regular)   Pulse 60   Temp 97.7  F (36.5  C) (Tympanic)   Resp 18   Ht 1.829 m (6')   Wt 68.5 kg (151 lb)   LMP  (LMP Unknown)   SpO2 96%   BMI 20.48 kg/m    PHYSICAL EXAMINATION:     GENERAL/CONSTITUTIONAL: No acute distress.  RESPIRATORY: Clear to auscultation bilaterally. No crackles or wheezing.   CARDIOVASCULAR: Regular rate and rhythm without murmurs, gallops, or rubs.  MUSCULOSKELETAL: Warm and well-perfused, no cyanosis, clubbing, or edema.  NEUROLOGIC: Cranial nerves II-XII are intact. Alert, oriented, answers questions appropriately.  INTEGUMENTARY: No rashes or jaundice.  GAIT: Steady, does not use assistive device.      LABS:   Latest Reference Range & Units 23 12:31   Sodium 136 - 145 mmol/L 137   Potassium 3.4 - 5.3 mmol/L 4.4   Chloride 98 - 107 mmol/L 103   Carbon Dioxide (CO2) 22 - 29 mmol/L 27   Urea Nitrogen 8.0 - 23.0 mg/dL 14.9   Creatinine 0.51 - 0.95 mg/dL 0.56   GFR Estimate >60 mL/min/1.73m2 >90   Calcium 8.8 - 10.2 mg/dL 8.6 (L)   Anion Gap 7 - 15 mmol/L 7    Albumin 3.5 - 5.2 g/dL 4.5   Protein Total 6.4 - 8.3 g/dL 6.1 (L)   Alkaline Phosphatase 35 - 104 U/L 136 (H)   ALT 10 - 35 U/L 18   AST 10 - 35 U/L 28   Bilirubin Total <=1.2 mg/dL 0.8   Glucose 70 - 99 mg/dL 90   Lactate Dehydrogenase 0 - 250 U/L 231   WBC 4.0 - 11.0 10e3/uL 6.4   Hemoglobin 11.7 - 15.7 g/dL 12.0   Hematocrit 35.0 - 47.0 % 34.5 (L)   Platelet Count 150 - 450 10e3/uL 143 (L)   RBC Count 3.80 - 5.20 10e6/uL 3.40 (L)   MCV 78 - 100 fL 102 (H)   MCH 26.5 - 33.0 pg 35.3 (H)   MCHC 31.5 - 36.5 g/dL 34.8   RDW 10.0 - 15.0 % 14.1   % Neutrophils % 33   % Lymphocytes % 58   % Monocytes % 9   % Eosinophils % 0   % Basophils % 0   Absolute Basophils 0.0 - 0.2 10e3/uL 0.0   Absolute Eosinophils 0.0 - 0.7 10e3/uL 0.0   Absolute Immature Granulocytes <=0.4 10e3/uL 0.0   Absolute Lymphocytes 0.8 - 5.3 10e3/uL 3.6   Absolute Monocytes 0.0 - 1.3 10e3/uL 0.6   % Immature Granulocytes % 0   Absolute Neutrophils 1.6 - 8.3 10e3/uL 2.1   Absolute NRBCs 10e3/uL 0.0   NRBCs per 100 WBC <1 /100 0   % Retic 0.5 - 2.0 % 2.2 (H)   Absolute Retic 0.025 - 0.095 10e6/uL 0.076   (L): Data is abnormally low  (H): Data is abnormally high    IMAGING:  BILATERAL FULL FIELD DIGITAL SCREENING MAMMOGRAM WITH TOMOSYNTHESIS     Performed on: 6/23/22     IMPRESSION: ACR BI-RADS Category 1: Negative     RECOMMENDED FOLLOW-UP: Annual routine screening mammogram          ASSESSMENT/PLAN:  Tricia Sosa is an 82 year old female with:        1. 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 Common variable immunodeficiency disorder  4. Splenomegaly           1) Autoimmune hemolytic anemia: She had relapse in July 2020, and started on prednisone, as well as weekly rituximab infusions x 4, completed on 8/21/20.  Since then, she has been off and on prednisone and rituximab, getting response each time, but relapses are getting more frequent.       She  does not want to consider splenectomy yet.      She is completing another 4 doses of weekly rituxumab in May 2022, and then will go on maintenance rituximab to try to maintain her hemoglobin longer.  She wants to try to avoid having to go back on steroids if possible.     -She has tapered off of prednisone again as of early March 2022. Off bactrim as well.  -Hemoglobin is stable in the 12's.  -Monitor CBC monthly.  -Continue maintenance rituximab every 2 months.     2) CVID:   -Continue IVIG.  She gets it, if IgG is <600.   -IgG check and IVIG every 4 weeks if meets parameters  -She saw immunology in [place] who recommended IVIG monthly, but our financial team continues to check and says that it is only covered with IgG is <600.      3) Iron-deficiency anemia  -s/p Venofer in May 2022.     4) Thrombocytopenia  -Waxing/waning.  -Monitor.    5) Hypertension  -Patient states her BP has been ranging 110s to 130's. I have asked her to keep a BP log at home. She will follow up with PCP in December 2022.   -She has not used hydrochlorothiazide in many months.     6) -Okay for rituximab today.  -Alternate follow up between FELIZ and myself every 2 months with date of rituximab.  -Continue IVIG as is if meeting parameters.    Leandra Ryder,   Hematology/Oncology  [facility]

## 2023-03-07 NOTE — NURSING NOTE
Oncology Rooming Note    March 7, 2023 1:19 PM   Tricia Sosa is a 82 year old female who presents for:    Chief Complaint   Patient presents with     Oncology Clinic Visit     Autoimmune hemolytic anemia     Initial Vitals: /72 (Cuff Size: Adult Regular)   Pulse 60   Temp 97.7  F (36.5  C) (Tympanic)   Resp 18   Wt 68.5 kg (151 lb)   LMP  (LMP Unknown)   SpO2 96%   BMI 20.48 kg/m   Estimated body mass index is 20.48 kg/m  as calculated from the following:    Height as of 9/20/22: 1.829 m (6').    Weight as of this encounter: 68.5 kg (151 lb). Body surface area is 1.87 meters squared.  No Pain (0) Comment: Data Unavailable   No LMP recorded (lmp unknown). Patient is postmenopausal.  Allergies reviewed: Yes  Medications reviewed: Yes    Medications: Medication refills not needed today.  Pharmacy name entered into [email or web address]:    [place], [place] - [address] [id number] [place], [place] - [address]    Clinical concerns: f/u       Corrie Hurt, MOHSEN

## 2023-03-07 NOTE — PROGRESS NOTES
Infusion Nursing Note:  Tricia JUNIOR Sosa presents today for Rituximab.    Patient seen by provider today: Yes: Dr. Leandra Ryder   present during visit today: Not Applicable.    Note: N/A    Intravenous Access:  Peripheral IV placed by Fast Track RN.    Treatment Conditions:  Not Applicable.    Post Infusion Assessment:  Patient tolerated infusion without incident.  Blood return noted pre and post infusion.  Site patent and intact, free from redness, edema or discomfort.  No evidence of extravasations.  Access discontinued per protocol.     Discharge Plan:   Discharge instructions reviewed with: Patient.  Patient and/or family verbalized understanding of discharge instructions and all questions answered.  AVS to patient via [employer].  Patient will return in 2 months for next Rituximab appointment, awaiting scheduling.  (She is also due for IVIG around 4/7.)  Advised patient to call scheduling if she does not hear from them in the next week.  Patient discharged in stable condition accompanied by: self.  Departure Mode: Ambulatory.      Hayes Hernandez RN

## 2023-03-07 NOTE — PROGRESS NOTES
Nursing Note:  Tricia Sosa presents today for labs and IV start.    Patient seen by provider today: Yes   present during visit today: Not Applicable.    Note: N/A.    Intravenous Access:  Labs drawn without difficulty.  Peripheral IV placed.    Discharge Plan:   Patient was sent to [facility] for provider appointment.    Ally Adams RN

## 2023-03-07 NOTE — LETTER
3/7/2023         RE: Tricia Sosa  [id number]  [facility] [id number]        Dear Colleague,    Thank you for referring your patient, Tricia Sosa, to the [facility]. Please see a copy of my visit note below.    [facility]    Hematology/Oncology Established Patient Follow-up Note    Treatment Summary:      Today's Date: 3/7/23    Reason for Follow-up: Hemolytic anemia-autoimmune; IgA deficiency     HISTORY OF PRESENT ILLNESS: Tricia Sosa is an 82 year old female who presents with autoimmune hemolytic anemia and IgA deficiency.  She previously saw Dr. Matos and then Dr. Summers.  She was previously seen at [facility].     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  She was very sick at the time and had severe diarrhea.  She lost about 40 pounds in weight.  The workup revealed that she had markedly diminished CD4 counts of 48 and was diagnosed with CMV colitis.  Since then she has been followed in hematology clinic at [place] until recently.  She was noted to have anemia which was fairly long-standing.  She was initially referred for anemia in 2004 and also had a bone marrow biopsy done at that time which revealed hypercellular bone marrow with 70-80% cellularity and normal trilineage hematopoiesis and no morphologic features of MDS or lymphoproliferation.  Her anemia was attributed to her chronic underlying disease.  At the next evaluation in 2002 on 4 aggressive anemia there was no evidence of hemolysis, iron deficiency, B12 or folate deficiency.  Bone marrow biopsy was not pursued.  In summer of 2015 she had progressive fatigue, dyspnea on exertion and worsening anemia with a hemoglobin now of 9.  Workup at this time did suggest a hemolytic anemia with elevated LDH at 709, undetectable haptoglobin and elevated unconjugated bilirubin at 3.3.  Monospecific MARIKA was positive for both IgG and complement.   Cold agglutinin screen was positive with very low titer 1:64.  She was started on prednisone 60 mg daily with supplementation of iron folate and B12 starting 7/31/15.  Her prednisone has been slowly tapered and was discontinued off in January 2016.  She was last followed at [facility] on March 10, 2016 when she had stable hemoglobin.     She was followed by Dr. Matos and Dr. Summers.  Due to relapse of hemolytic anemia, she underwent another course of prednisone that has since been tapered off and rituximab x 4 weekly doses completed in 9/19/19.     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  She started weekly Rituxan on 7/31/20 x 4 doses, completed on 8/21/20.  She tapered off of prednisone in October 2020.     Due to relapse of her AIHA, she started prednisone again on 6/8/21 at 60 mg daily with slow taper and finished taper in August 2021.     Due to relapse of AIHA again, she started prednisone again on 11/2/21 at 70 mg daily with slow taper.  She also completed weekly Rituxan again x 4 doses 12/6/21-12/29/21.    Venofer 5/5-5/19/22.      INTERIM HISTORY:  Patient continues to do well.  She continues rituximab on maintenance every 2 months. She continues on IVIG as well.    She has been working with physical therapy. She has had vertigo from time to time; she states this has been chronic since the 2010's. No weight loss, fatigue, LAD, night sweats, fever. No gross evidence of bleed.         REVIEW OF SYSTEMS:   A 14 point ROS was reviewed with pertinent positives and negatives in the HPI.       HOME MEDICATIONS:  Current Outpatient Medications   Medication Sig Dispense Refill     cyanocobalamin (VITAMIN  B-12) 1000 MCG tablet   3     folic acid (FOLVITE) 1 MG tablet   3     levothyroxine (SYNTHROID/LEVOTHROID) 150 MCG tablet Take 1 tablet  by mouth daily 90 tablet 0         ALLERGIES:  Allergies   Allergen Reactions     Doxycycline          PAST MEDICAL  HISTORY:  Past Medical History:   Diagnosis Date     WARD (dyspnea on exertion)      External hemorrhoids without mention of complication 6/27/05     Hemolytic anemia (H)     autoimmune     Hypothyroidism      IgA deficiency (H)      Myopia of both eyes with astigmatism and presbyopia 8/16/2017     Other malaise and fatigue      Other severe protein-calorie malnutrition      Other vitreous opacities 12/19/2006     Thyroid disease      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    Hemorrhage Subdural Trauma Without LOC Active     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, and posterior vitreous detachment     Urinary tract infection, site not specified     Recurrent UTI's         PAST SURGICAL HISTORY:  Past Surgical History:   Procedure Laterality Date     COLONOSCOPY N/A 4/26/2016    Procedure: COLONOSCOPY;  Surgeon: Dontae Del Rio MD;  Location:  GI     ENT SURGERY  1985    Thyroid lobectomy     EYE SURGERY Bilateral 04/20/2021    Cataract Surgery     HC CYSTOSCOPY,INSERT URETERAL STENT      Ureteral stent insertion     HC FLEX SIGMOIDOSCOPY W BIOPSY  5/30/00     HC LAPAROSCOPY, SURGICAL; CHOLECYSTECTOMY  1992      THYROIDECTOMY       LIGATION OF HEMORRHOID(S)      Hemorrhoidectomy     UPPER GI ENDOSCOPY,BIOPSY  10/01/01     ZZC LIGATE FALLOPIAN TUBE       ZZHC COLONOSCOPY W BIOPSY  4/26/00     ZZHC COLONOSCOPY W BIOPSY  10/8/03     ZZHC COLONOSCOPY W BIOPSY  6/27/05    REPEAT IN 5 YEARS         SOCIAL HISTORY:  Social History     Socioeconomic History     Marital status:      Spouse name: Not on file     Number of children: Not on file     Years of education: Not on file     Highest education level: Not on file   Occupational History     Not on file   Tobacco Use     Smoking status: Never     Smokeless tobacco: Never   Substance and Sexual Activity     Alcohol use: Yes     Alcohol/week: 0.0 standard drinks     Comment: 1 a day at most     Drug use: No      Sexual activity: Not Currently     Partners: Male   Other Topics Concern     Parent/sibling w/ CABG, MI or angioplasty before 65F 55M? No   Social History Narrative     Not on file     Social Determinants of Health     Financial Resource Strain: Not on file   Food Insecurity: Not on file   Transportation Needs: Not on file   Physical Activity: Not on file   Stress: Not on file   Social Connections: Not on file   Intimate Partner Violence: Not At Risk     Fear of Current or Ex-Partner: No     Emotionally Abused: No     Physically Abused: No     Sexually Abused: No   Housing Stability: Not on file         FAMILY HISTORY:  Family History   Problem Relation Age of Onset     Colon Cancer Mother 90     Cerebrovascular Disease Father          at age 85     Dementia Brother      Prostate Cancer Brother      Thyroid Disease Brother      Dementia Brother      Thyroid Disease Brother      Pancreatic Cancer Brother      Breast Cancer Sister      Hyperlipidemia Sister      Depression Sister      Anxiety Disorder Sister      Thyroid Disease Sister      Breast Cancer Sister      Colon Cancer Niece      Other Cancer Niece         leukemia         PHYSICAL EXAM:  /72 (Cuff Size: Adult Regular)   Pulse 60   Temp 97.7  F (36.5  C) (Tympanic)   Resp 18   Ht 1.829 m (6')   Wt 68.5 kg (151 lb)   LMP  (LMP Unknown)   SpO2 96%   BMI 20.48 kg/m    PHYSICAL EXAMINATION:     GENERAL/CONSTITUTIONAL: No acute distress.  RESPIRATORY: Clear to auscultation bilaterally. No crackles or wheezing.   CARDIOVASCULAR: Regular rate and rhythm without murmurs, gallops, or rubs.  MUSCULOSKELETAL: Warm and well-perfused, no cyanosis, clubbing, or edema.  NEUROLOGIC: Cranial nerves II-XII are intact. Alert, oriented, answers questions appropriately.  INTEGUMENTARY: No rashes or jaundice.  GAIT: Steady, does not use assistive device.      LABS:   Latest Reference Range & Units 23 12:31   Sodium 136 - 145 mmol/L 137   Potassium 3.4 -  5.3 mmol/L 4.4   Chloride 98 - 107 mmol/L 103   Carbon Dioxide (CO2) 22 - 29 mmol/L 27   Urea Nitrogen 8.0 - 23.0 mg/dL 14.9   Creatinine 0.51 - 0.95 mg/dL 0.56   GFR Estimate >60 mL/min/1.73m2 >90   Calcium 8.8 - 10.2 mg/dL 8.6 (L)   Anion Gap 7 - 15 mmol/L 7   Albumin 3.5 - 5.2 g/dL 4.5   Protein Total 6.4 - 8.3 g/dL 6.1 (L)   Alkaline Phosphatase 35 - 104 U/L 136 (H)   ALT 10 - 35 U/L 18   AST 10 - 35 U/L 28   Bilirubin Total <=1.2 mg/dL 0.8   Glucose 70 - 99 mg/dL 90   Lactate Dehydrogenase 0 - 250 U/L 231   WBC 4.0 - 11.0 10e3/uL 6.4   Hemoglobin 11.7 - 15.7 g/dL 12.0   Hematocrit 35.0 - 47.0 % 34.5 (L)   Platelet Count 150 - 450 10e3/uL 143 (L)   RBC Count 3.80 - 5.20 10e6/uL 3.40 (L)   MCV 78 - 100 fL 102 (H)   MCH 26.5 - 33.0 pg 35.3 (H)   MCHC 31.5 - 36.5 g/dL 34.8   RDW 10.0 - 15.0 % 14.1   % Neutrophils % 33   % Lymphocytes % 58   % Monocytes % 9   % Eosinophils % 0   % Basophils % 0   Absolute Basophils 0.0 - 0.2 10e3/uL 0.0   Absolute Eosinophils 0.0 - 0.7 10e3/uL 0.0   Absolute Immature Granulocytes <=0.4 10e3/uL 0.0   Absolute Lymphocytes 0.8 - 5.3 10e3/uL 3.6   Absolute Monocytes 0.0 - 1.3 10e3/uL 0.6   % Immature Granulocytes % 0   Absolute Neutrophils 1.6 - 8.3 10e3/uL 2.1   Absolute NRBCs 10e3/uL 0.0   NRBCs per 100 WBC <1 /100 0   % Retic 0.5 - 2.0 % 2.2 (H)   Absolute Retic 0.025 - 0.095 10e6/uL 0.076   (L): Data is abnormally low  (H): Data is abnormally high    IMAGING:  BILATERAL FULL FIELD DIGITAL SCREENING MAMMOGRAM WITH TOMOSYNTHESIS     Performed on: 6/23/22     IMPRESSION: ACR BI-RADS Category 1: Negative     RECOMMENDED FOLLOW-UP: Annual routine screening mammogram          ASSESSMENT/PLAN:  Tricia Sosa is an 82 year old female with:        1. 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 Common variable immunodeficiency disorder  4. Splenomegaly           1) Autoimmune hemolytic anemia:  She had relapse in July 2020, and started on prednisone, as well as weekly rituximab infusions x 4, completed on 8/21/20.  Since then, she has been off and on prednisone and rituximab, getting response each time, but relapses are getting more frequent.       She does not want to consider splenectomy yet.      She is completing another 4 doses of weekly rituxumab in May 2022, and then will go on maintenance rituximab to try to maintain her hemoglobin longer.  She wants to try to avoid having to go back on steroids if possible.     -She has tapered off of prednisone again as of early March 2022. Off bactrim as well.  -Hemoglobin is stable in the 12's.  -Monitor CBC monthly.  -Continue maintenance rituximab every 2 months.     2) CVID:   -Continue IVIG.  She gets it, if IgG is <600.   -IgG check and IVIG every 4 weeks if meets parameters  -She saw immunology in [place] who recommended IVIG monthly, but our financial team continues to check and says that it is only covered with IgG is <600.      3) Iron-deficiency anemia  -s/p Venofer in May 2022.     4) Thrombocytopenia  -Waxing/waning.  -Monitor.    5) Hypertension  -Patient states her BP has been ranging 110s to 130's. I have asked her to keep a BP log at home. She will follow up with PCP in December 2022.   -She has not used hydrochlorothiazide in many months.     6) -Okay for rituximab today.  -Alternate follow up between FELIZ and myself every 2 months with date of rituximab.  -Continue IVIG as is if meeting parameters.    Leandra Ryder DO  Hematology/Oncology  [facility]        Again, thank you for allowing me to participate in the care of your patient.        Sincerely,        Leandra Ryder DO

## 2023-03-08 LAB
HAPTOGLOB SERPL-MCNC: <3 MG/DL (ref 32–197)
IGA SERPL-MCNC: <2 MG/DL (ref 84–499)
IGG SERPL-MCNC: 576 MG/DL (ref 610–1616)
IGM SERPL-MCNC: <10 MG/DL (ref 35–242)

## 2023-03-13 DIAGNOSIS — E03.9 ACQUIRED HYPOTHYROIDISM: ICD-10-CM

## 2023-03-14 ENCOUNTER — DOCUMENTATION ONLY (OUTPATIENT)
Dept: SLEEP MEDICINE | Facility: CLINIC | Age: 83
End: 2023-03-14
Payer: MEDICARE

## 2023-03-14 NOTE — PROGRESS NOTES
STM Recheck: Patient called she is using her CPAP again.  She CPAP starts at 5 cm H20 now vs 9 cm H20.  She states everything going well with CPAP.

## 2023-03-16 RX ORDER — LEVOTHYROXINE SODIUM 150 UG/1
TABLET ORAL
Qty: 90 TABLET | Refills: 0 | Status: SHIPPED | OUTPATIENT
Start: 2023-03-16 | End: 2023-06-13

## 2023-03-16 NOTE — TELEPHONE ENCOUNTER
Levothyroxine 150 mcg tab  Routing refill request to provider for review/approval because:  Patient needs to be seen because it has been more than 1 year since last office visit.    Appointments in Next Year      Mar 21, 2023  2:30 PM  (Arrive by 2:10 PM)  Annual Wellness Visit with Emily Church MD  [facility] ([facility] ) [phone]     LOV 11/2021

## 2023-03-24 NOTE — PATIENT INSTRUCTIONS
Tricia is scheduled for the following:     - Labs on 04/10/23  - IVIG on 04/14/23  - Labs, follow up with FELIZ Abdi, and Rituxan on 05/02/23    Brandee Bowman RN on 3/24/2023 at 3:47 PM

## 2023-04-10 ENCOUNTER — LAB (OUTPATIENT)
Dept: ONCOLOGY | Facility: CLINIC | Age: 83
End: 2023-04-10
Attending: INTERNAL MEDICINE
Payer: MEDICARE

## 2023-04-10 DIAGNOSIS — D59.10 AUTOIMMUNE HEMOLYTIC ANEMIA (H): ICD-10-CM

## 2023-04-10 LAB
ALBUMIN SERPL BCG-MCNC: 4.4 G/DL (ref 3.5–5.2)
ALP SERPL-CCNC: 134 U/L (ref 35–104)
ALT SERPL W P-5'-P-CCNC: 17 U/L (ref 10–35)
ANION GAP SERPL CALCULATED.3IONS-SCNC: 10 MMOL/L (ref 7–15)
AST SERPL W P-5'-P-CCNC: 28 U/L (ref 10–35)
BASOPHILS # BLD AUTO: 0 10E3/UL (ref 0–0.2)
BASOPHILS NFR BLD AUTO: 0 %
BILIRUB SERPL-MCNC: 0.7 MG/DL
BUN SERPL-MCNC: 15.1 MG/DL (ref 8–23)
CALCIUM SERPL-MCNC: 8.7 MG/DL (ref 8.8–10.2)
CHLORIDE SERPL-SCNC: 102 MMOL/L (ref 98–107)
CREAT SERPL-MCNC: 0.52 MG/DL (ref 0.51–0.95)
DEPRECATED HCO3 PLAS-SCNC: 26 MMOL/L (ref 22–29)
EOSINOPHIL # BLD AUTO: 0 10E3/UL (ref 0–0.7)
EOSINOPHIL NFR BLD AUTO: 0 %
ERYTHROCYTE [DISTWIDTH] IN BLOOD BY AUTOMATED COUNT: 13.7 % (ref 10–15)
GFR SERPL CREATININE-BSD FRML MDRD: >90 ML/MIN/1.73M2
GLUCOSE SERPL-MCNC: 91 MG/DL (ref 70–99)
HCT VFR BLD AUTO: 33.9 % (ref 35–47)
HGB BLD-MCNC: 11.7 G/DL (ref 11.7–15.7)
IMM GRANULOCYTES # BLD: 0 10E3/UL
IMM GRANULOCYTES NFR BLD: 0 %
LDH SERPL L TO P-CCNC: 239 U/L (ref 0–250)
LYMPHOCYTES # BLD AUTO: 2.6 10E3/UL (ref 0.8–5.3)
LYMPHOCYTES NFR BLD AUTO: 47 %
MCH RBC QN AUTO: 35.6 PG (ref 26.5–33)
MCHC RBC AUTO-ENTMCNC: 34.5 G/DL (ref 31.5–36.5)
MCV RBC AUTO: 103 FL (ref 78–100)
MONOCYTES # BLD AUTO: 0.5 10E3/UL (ref 0–1.3)
MONOCYTES NFR BLD AUTO: 10 %
NEUTROPHILS # BLD AUTO: 2.4 10E3/UL (ref 1.6–8.3)
NEUTROPHILS NFR BLD AUTO: 43 %
NRBC # BLD AUTO: 0 10E3/UL
NRBC BLD AUTO-RTO: 0 /100
PLATELET # BLD AUTO: 139 10E3/UL (ref 150–450)
POTASSIUM SERPL-SCNC: 4 MMOL/L (ref 3.4–5.3)
PROT SERPL-MCNC: 6 G/DL (ref 6.4–8.3)
RBC # BLD AUTO: 3.29 10E6/UL (ref 3.8–5.2)
RETICS # AUTO: 0.07 10E6/UL (ref 0.03–0.1)
RETICS/RBC NFR AUTO: 2.3 % (ref 0.5–2)
SODIUM SERPL-SCNC: 138 MMOL/L (ref 136–145)
WBC # BLD AUTO: 5.6 10E3/UL (ref 4–11)

## 2023-04-10 PROCEDURE — 85025 COMPLETE CBC W/AUTO DIFF WBC: CPT | Performed by: INTERNAL MEDICINE

## 2023-04-10 PROCEDURE — 80053 COMPREHEN METABOLIC PANEL: CPT | Performed by: INTERNAL MEDICINE

## 2023-04-10 PROCEDURE — 36415 COLL VENOUS BLD VENIPUNCTURE: CPT

## 2023-04-10 PROCEDURE — 85045 AUTOMATED RETICULOCYTE COUNT: CPT | Performed by: INTERNAL MEDICINE

## 2023-04-10 PROCEDURE — 82784 ASSAY IGA/IGD/IGG/IGM EACH: CPT | Performed by: INTERNAL MEDICINE

## 2023-04-10 PROCEDURE — 83010 ASSAY OF HAPTOGLOBIN QUANT: CPT | Performed by: INTERNAL MEDICINE

## 2023-04-10 PROCEDURE — 83615 LACTATE (LD) (LDH) ENZYME: CPT | Performed by: INTERNAL MEDICINE

## 2023-04-10 NOTE — PROGRESS NOTES
Medical Assistant Note:  Tricia Sosa presents today for labs.    Patient seen by provider today: No.   present during visit today: Not Applicable.    Concerns: No Concerns.    Procedure:  Lab draw site: right antecubital, Needle type: butterfly, Gauge: 23.    Post Assessment:  Labs drawn without difficulty: Yes.    Discharge Plan:  Departure Mode: Ambulatory.    Face to Face Time: 10 minutes.    Montserrat Hutchins, CMA

## 2023-04-11 LAB
HAPTOGLOB SERPL-MCNC: <3 MG/DL (ref 32–197)
IGA SERPL-MCNC: <2 MG/DL (ref 84–499)
IGG SERPL-MCNC: 367 MG/DL (ref 610–1616)
IGM SERPL-MCNC: <10 MG/DL (ref 35–242)

## 2023-04-11 RX ORDER — METHYLPREDNISOLONE SODIUM SUCCINATE 125 MG/2ML
125 INJECTION, POWDER, LYOPHILIZED, FOR SOLUTION INTRAMUSCULAR; INTRAVENOUS
Status: CANCELLED
Start: 2023-04-11

## 2023-04-11 RX ORDER — HEPARIN SODIUM,PORCINE 10 UNIT/ML
5 VIAL (ML) INTRAVENOUS
Status: CANCELLED | OUTPATIENT
Start: 2023-04-11

## 2023-04-11 RX ORDER — DIPHENHYDRAMINE HYDROCHLORIDE 50 MG/ML
50 INJECTION INTRAMUSCULAR; INTRAVENOUS
Status: CANCELLED
Start: 2023-04-11

## 2023-04-11 RX ORDER — ACETAMINOPHEN 325 MG/1
650 TABLET ORAL ONCE
Status: CANCELLED
Start: 2023-04-11

## 2023-04-11 RX ORDER — ALBUTEROL SULFATE 90 UG/1
1-2 AEROSOL, METERED RESPIRATORY (INHALATION)
Status: CANCELLED
Start: 2023-04-11

## 2023-04-11 RX ORDER — MEPERIDINE HYDROCHLORIDE 25 MG/ML
25 INJECTION INTRAMUSCULAR; INTRAVENOUS; SUBCUTANEOUS EVERY 30 MIN PRN
Status: CANCELLED | OUTPATIENT
Start: 2023-04-11

## 2023-04-11 RX ORDER — NALOXONE HYDROCHLORIDE 0.4 MG/ML
0.2 INJECTION, SOLUTION INTRAMUSCULAR; INTRAVENOUS; SUBCUTANEOUS
Status: CANCELLED | OUTPATIENT
Start: 2023-04-11

## 2023-04-11 RX ORDER — ALBUTEROL SULFATE 0.83 MG/ML
2.5 SOLUTION RESPIRATORY (INHALATION)
Status: CANCELLED | OUTPATIENT
Start: 2023-04-11

## 2023-04-11 RX ORDER — DIPHENHYDRAMINE HCL 25 MG
50 CAPSULE ORAL ONCE
Status: CANCELLED
Start: 2023-04-11

## 2023-04-11 RX ORDER — EPINEPHRINE 1 MG/ML
0.3 INJECTION, SOLUTION INTRAMUSCULAR; SUBCUTANEOUS EVERY 5 MIN PRN
Status: CANCELLED | OUTPATIENT
Start: 2023-04-11

## 2023-04-11 RX ORDER — HEPARIN SODIUM (PORCINE) LOCK FLUSH IV SOLN 100 UNIT/ML 100 UNIT/ML
5 SOLUTION INTRAVENOUS
Status: CANCELLED | OUTPATIENT
Start: 2023-04-11

## 2023-04-11 ASSESSMENT — ENCOUNTER SYMPTOMS
NAUSEA: 0
PALPITATIONS: 0
COUGH: 0
PARESTHESIAS: 0
ARTHRALGIAS: 1
WEAKNESS: 1
SHORTNESS OF BREATH: 0
HEARTBURN: 0
HEMATOCHEZIA: 0
FREQUENCY: 0
JOINT SWELLING: 0
MYALGIAS: 0
DIARRHEA: 0
EYE PAIN: 0
CHILLS: 0
HEADACHES: 0
NERVOUS/ANXIOUS: 0
CONSTIPATION: 0
DYSURIA: 0
ABDOMINAL PAIN: 0
HEMATURIA: 0
SORE THROAT: 0
DIZZINESS: 1
FEVER: 0
BREAST MASS: 0

## 2023-04-11 ASSESSMENT — ACTIVITIES OF DAILY LIVING (ADL)
CURRENT_FUNCTION: SHOPPING REQUIRES ASSISTANCE
CURRENT_FUNCTION: HOUSEWORK REQUIRES ASSISTANCE
CURRENT_FUNCTION: PREPARING MEALS REQUIRES ASSISTANCE
CURRENT_FUNCTION: TRANSPORTATION REQUIRES ASSISTANCE

## 2023-04-14 ENCOUNTER — INFUSION THERAPY VISIT (OUTPATIENT)
Dept: INFUSION THERAPY | Facility: CLINIC | Age: 83
End: 2023-04-14
Attending: INTERNAL MEDICINE
Payer: MEDICARE

## 2023-04-14 VITALS
WEIGHT: 151.8 LBS | BODY MASS INDEX: 20.59 KG/M2 | TEMPERATURE: 99 F | HEART RATE: 77 BPM | DIASTOLIC BLOOD PRESSURE: 48 MMHG | SYSTOLIC BLOOD PRESSURE: 110 MMHG | RESPIRATION RATE: 16 BRPM | OXYGEN SATURATION: 95 %

## 2023-04-14 DIAGNOSIS — D80.3 SELECTIVE DEFICIENCY OF IGG SUBCLASSES (H): Primary | ICD-10-CM

## 2023-04-14 DIAGNOSIS — D59.10 AUTOIMMUNE HEMOLYTIC ANEMIA (H): ICD-10-CM

## 2023-04-14 DIAGNOSIS — D83.9 CVID (COMMON VARIABLE IMMUNODEFICIENCY) (H): ICD-10-CM

## 2023-04-14 DIAGNOSIS — D59.19 OTHER AUTOIMMUNE HEMOLYTIC ANEMIA (H): ICD-10-CM

## 2023-04-14 PROCEDURE — 96365 THER/PROPH/DIAG IV INF INIT: CPT

## 2023-04-14 PROCEDURE — 250N000011 HC RX IP 250 OP 636: Performed by: INTERNAL MEDICINE

## 2023-04-14 PROCEDURE — 96375 TX/PRO/DX INJ NEW DRUG ADDON: CPT

## 2023-04-14 PROCEDURE — 96366 THER/PROPH/DIAG IV INF ADDON: CPT

## 2023-04-14 RX ORDER — DIPHENHYDRAMINE HYDROCHLORIDE 50 MG/ML
50 INJECTION INTRAMUSCULAR; INTRAVENOUS
Status: CANCELLED
Start: 2023-04-21

## 2023-04-14 RX ORDER — NALOXONE HYDROCHLORIDE 0.4 MG/ML
0.2 INJECTION, SOLUTION INTRAMUSCULAR; INTRAVENOUS; SUBCUTANEOUS
Status: CANCELLED | OUTPATIENT
Start: 2023-04-21

## 2023-04-14 RX ORDER — HEPARIN SODIUM,PORCINE 10 UNIT/ML
5 VIAL (ML) INTRAVENOUS
Status: CANCELLED | OUTPATIENT
Start: 2023-04-21

## 2023-04-14 RX ORDER — HEPARIN SODIUM (PORCINE) LOCK FLUSH IV SOLN 100 UNIT/ML 100 UNIT/ML
5 SOLUTION INTRAVENOUS
Status: CANCELLED | OUTPATIENT
Start: 2023-04-21

## 2023-04-14 RX ORDER — METHYLPREDNISOLONE SODIUM SUCCINATE 125 MG/2ML
125 INJECTION, POWDER, LYOPHILIZED, FOR SOLUTION INTRAMUSCULAR; INTRAVENOUS
Status: CANCELLED
Start: 2023-04-21

## 2023-04-14 RX ORDER — ACETAMINOPHEN 325 MG/1
650 TABLET ORAL ONCE
Status: CANCELLED
Start: 2023-04-21

## 2023-04-14 RX ORDER — METHYLPREDNISOLONE SODIUM SUCCINATE 40 MG/ML
20 INJECTION, POWDER, LYOPHILIZED, FOR SOLUTION INTRAMUSCULAR; INTRAVENOUS ONCE
Status: COMPLETED | OUTPATIENT
Start: 2023-04-14 | End: 2023-04-14

## 2023-04-14 RX ORDER — ALBUTEROL SULFATE 0.83 MG/ML
2.5 SOLUTION RESPIRATORY (INHALATION)
Status: CANCELLED | OUTPATIENT
Start: 2023-04-21

## 2023-04-14 RX ORDER — MEPERIDINE HYDROCHLORIDE 25 MG/ML
25 INJECTION INTRAMUSCULAR; INTRAVENOUS; SUBCUTANEOUS EVERY 30 MIN PRN
Status: CANCELLED | OUTPATIENT
Start: 2023-04-21

## 2023-04-14 RX ORDER — DIPHENHYDRAMINE HCL 25 MG
50 CAPSULE ORAL ONCE
Status: CANCELLED
Start: 2023-04-21

## 2023-04-14 RX ORDER — ALBUTEROL SULFATE 90 UG/1
1-2 AEROSOL, METERED RESPIRATORY (INHALATION)
Status: CANCELLED
Start: 2023-04-21

## 2023-04-14 RX ORDER — EPINEPHRINE 1 MG/ML
0.3 INJECTION, SOLUTION INTRAMUSCULAR; SUBCUTANEOUS EVERY 5 MIN PRN
Status: CANCELLED | OUTPATIENT
Start: 2023-04-21

## 2023-04-14 RX ADMIN — METHYLPREDNISOLONE SODIUM SUCCINATE 20 MG: 40 INJECTION, POWDER, FOR SOLUTION INTRAMUSCULAR; INTRAVENOUS at 12:33

## 2023-04-14 RX ADMIN — HUMAN IMMUNOGLOBULIN G 35 G: 20 LIQUID INTRAVENOUS at 12:37

## 2023-04-14 NOTE — PROGRESS NOTES
Infusion Nursing Note:  Maandrew Sosa presents today for IVIG.    Patient seen by provider today: No   present during visit today: Not Applicable.    Note: Premed: solumedrol, patient declined tylenol and benadryl.    Started infusion at 0.5ml/kg/hr and increased by 0.5ml/kg/hr every 15 minutes, max rate 4ml/kg/hr.      Intravenous Access:  Peripheral IV placed.    Treatment Conditions:     on 4/10/23    Biological Infusion Checklist:  ~~~ NOTE: If the patient answers yes to any of the questions below, hold the infusion and contact ordering provider or on-call provider.    1. Have you recently had an elevated temperature, fever, chills, productive cough, coughing for 3 weeks or longer or hemoptysis, abnormal vital signs, night sweats,  chest pain or have you noticed a decrease in your appetite, unexplained weight loss or fatigue? No  2. Do you have any open wounds or new incisions? No  3. Do you have any recent or upcoming hospitalizations, surgeries or dental procedures? No  4. Do you currently have or recently have had any signs of illness or infection or are you on any antibiotics? No  5. Have you had any new, sudden or worsening abdominal pain? No  6. Have you or anyone in your household received a live vaccination in the past 4 weeks? Please note:  No live vaccines while on biologic/chemotherapy until 6 months after the last treatment.  Patient can receive the flu vaccine (shot only) and the pneumovax.  It is optimal for the patient to get these vaccines mid cycle, but they can be given at any time as long as it is not on the day of the infusion. No  7. Have you recently been diagnosed with any new nervous system diseases (ie. Multiple sclerosis, Guillain [place], seizures, neurological changes) or cancer diagnosis? No  8. Are you on any form of radiation or chemotherapy? No  9. Are you pregnant or breast feeding or do you have plans of pregnancy in the future? No  10. Have you been having any  signs of worsening depression or suicidal ideations?  (benlysta only) No  11. Have there been any other new onset medical symptoms? No        Post Infusion Assessment:  Patient tolerated infusion without incident.  Blood return noted pre and post infusion.  Site patent and intact, free from redness, edema or discomfort.  No evidence of extravasations.  Access discontinued per protocol.       Discharge Plan:   AVS to patient via MYCHART.  Patient will return 5/2/23 for next appointment.   Patient discharged in stable condition accompanied by: self.  Departure Mode: Ambulatory.      Ally Adams RN

## 2023-04-18 ENCOUNTER — OFFICE VISIT (OUTPATIENT)
Dept: INTERNAL MEDICINE | Facility: CLINIC | Age: 83
End: 2023-04-18
Payer: MEDICARE

## 2023-04-18 VITALS
RESPIRATION RATE: 18 BRPM | WEIGHT: 150.4 LBS | OXYGEN SATURATION: 96 % | SYSTOLIC BLOOD PRESSURE: 124 MMHG | HEART RATE: 105 BPM | BODY MASS INDEX: 20.37 KG/M2 | HEIGHT: 72 IN | TEMPERATURE: 98.8 F | DIASTOLIC BLOOD PRESSURE: 68 MMHG

## 2023-04-18 DIAGNOSIS — R09.89 PHLEGM IN THROAT: ICD-10-CM

## 2023-04-18 DIAGNOSIS — M62.81 MUSCLE WEAKNESS (GENERALIZED): ICD-10-CM

## 2023-04-18 DIAGNOSIS — G62.9 NEUROPATHY: ICD-10-CM

## 2023-04-18 DIAGNOSIS — Z00.00 ROUTINE GENERAL MEDICAL EXAMINATION AT A HEALTH CARE FACILITY: Primary | ICD-10-CM

## 2023-04-18 DIAGNOSIS — Z12.31 ENCOUNTER FOR SCREENING MAMMOGRAM FOR BREAST CANCER: ICD-10-CM

## 2023-04-18 DIAGNOSIS — Z12.11 SCREEN FOR COLON CANCER: ICD-10-CM

## 2023-04-18 DIAGNOSIS — E03.9 ACQUIRED HYPOTHYROIDISM: ICD-10-CM

## 2023-04-18 LAB — TSH SERPL DL<=0.005 MIU/L-ACNC: 4.15 UIU/ML (ref 0.3–4.2)

## 2023-04-18 PROCEDURE — 36415 COLL VENOUS BLD VENIPUNCTURE: CPT | Performed by: INTERNAL MEDICINE

## 2023-04-18 PROCEDURE — 99214 OFFICE O/P EST MOD 30 MIN: CPT | Mod: 25 | Performed by: INTERNAL MEDICINE

## 2023-04-18 PROCEDURE — G0439 PPPS, SUBSEQ VISIT: HCPCS | Performed by: INTERNAL MEDICINE

## 2023-04-18 PROCEDURE — 84443 ASSAY THYROID STIM HORMONE: CPT | Performed by: INTERNAL MEDICINE

## 2023-04-18 RX ORDER — FLUTICASONE PROPIONATE 50 MCG
1 SPRAY, SUSPENSION (ML) NASAL DAILY
Qty: 11.1 ML | Refills: 1 | Status: SHIPPED | OUTPATIENT
Start: 2023-04-18 | End: 2023-06-27

## 2023-04-18 ASSESSMENT — ENCOUNTER SYMPTOMS
MYALGIAS: 0
SORE THROAT: 0
NERVOUS/ANXIOUS: 0
CONSTIPATION: 0
DYSURIA: 0
NAUSEA: 0
FREQUENCY: 0
HEMATOCHEZIA: 0
BREAST MASS: 0
DIARRHEA: 0
CHILLS: 0
HEADACHES: 0
HEARTBURN: 0
SHORTNESS OF BREATH: 0
HEMATURIA: 0
EYE PAIN: 0
COUGH: 0
JOINT SWELLING: 0
FEVER: 0
ARTHRALGIAS: 1
ABDOMINAL PAIN: 0
WEAKNESS: 1
PALPITATIONS: 0
DIZZINESS: 1
PARESTHESIAS: 0

## 2023-04-18 ASSESSMENT — ACTIVITIES OF DAILY LIVING (ADL)
CURRENT_FUNCTION: SHOPPING REQUIRES ASSISTANCE
CURRENT_FUNCTION: HOUSEWORK REQUIRES ASSISTANCE
CURRENT_FUNCTION: TRANSPORTATION REQUIRES ASSISTANCE
CURRENT_FUNCTION: PREPARING MEALS REQUIRES ASSISTANCE

## 2023-04-18 ASSESSMENT — PAIN SCALES - GENERAL: PAINLEVEL: NO PAIN (0)

## 2023-04-18 NOTE — PROGRESS NOTES
"SUBJECTIVE:   Tricia is a 82 year old who presents for Preventive Visit.       View : No data to display.              Patient has been advised of split billing requirements and indicates understanding: Yes  Are you in the first 12 months of your Medicare coverage?  No    Healthy Habits:     In general, how would you rate your overall health?  Good    Frequency of exercise:  6-7 days/week    Duration of exercise:  15-30 minutes    Do you usually eat at least 4 servings of fruit and vegetables a day, include whole grains    & fiber and avoid regularly eating high fat or \"junk\" foods?  No    Taking medications regularly:  Yes    Medication side effects:  None    Ability to successfully perform activities of daily living:  Transportation requires assistance, shopping requires assistance, preparing meals requires assistance and housework requires assistance    Home Safety:  No safety concerns identified    Hearing Impairment:  Difficulty following a conversation in a noisy restaurant or crowded room, feel that people are mumbling or not speaking clearly, need to ask people to speak up or repeat themselves, find that men's voices are easier to understand than woman's and difficulty understanding speech on the telephone    In the past 6 months, have you been bothered by leaking of urine?  No    In general, how would you rate your overall mental or emotional health?  Good      PHQ-2 Total Score: 0    Additional concerns today:  Yes    Thick yellow phlegm. Frequent throat clearing. Has cough with it. Thinks it may be linked to the sinuses.no fevers/chills. Symptoms have been there since last September.  Has history of peripheral neuropathy. Feeling pins and needles in both legs with numbness for past 4 week.has seen [facility] around 2021/2022.would like to see neurology team in [place].  Muscle weakness with back weakness which has been chronic. Has been having PT and would like to be seen by the team " again.      Have you ever done Advance Care Planning? (For example, a Health Directive, POLST, or a discussion with a medical provider or your loved ones about your wishes): No, advance care planning information given to patient to review.  Patient declined advance care planning discussion at this time.       Fall risk  Fallen 2 or more times in the past year?: No  Any fall with injury in the past year?: No    Cognitive Screening   1) Repeat 3 items (Leader, Season, Table)      2) Clock draw:   NORMAL  3) 3 item recall:   Recalls 3 objects  Results: 3 items recalled: COGNITIVE IMPAIRMENT LESS LIKELY    Mini-CogTM Copyright S Otf. Licensed by the author for use in [facility]; reprinted with permission (soob@[facility]). All rights reserved.      Do you have sleep apnea, excessive snoring or daytime drowsiness?: yes    Reviewed and updated as needed this visit by clinical staff   Tobacco  Allergies  Meds              Reviewed and updated as needed this visit by Provider                 Social History     Tobacco Use     Smoking status: Never     Smokeless tobacco: Never   Vaping Use     Vaping status: Never Used   Substance Use Topics     Alcohol use: Yes     Alcohol/week: 0.0 standard drinks of alcohol     Comment: 1 a day at most             4/11/2023    12:18 PM   Alcohol Use   Prescreen: >3 drinks/day or >7 drinks/week? No     Do you have a current opioid prescription? No  Do you use any other controlled substances or medications that are not prescribed by a provider? None          Hypothyroidism Follow-up      Since last visit, patient describes the following symptoms: Weight stable, no hair loss, no skin changes, no constipation, no loose stools      Current providers sharing in care for this patient include:   Patient Care Team:  Emily Church MD as PCP - General (Internal Medicine)  Emily Church MD as Assigned PCP  Judtih Solis PA-C as Assigned Sleep Provider  Marley Heart  MD Cheng as Assigned Neuroscience Provider  Gurjit Castillo MD as Assigned Musculoskeletal Provider  Brandee Bowman, RN as Specialty Care Coordinator (Hematology & Oncology)  Leandra Ryder DO as Assigned Cancer Care Provider    The following health maintenance items are reviewed in Epic and correct as of today:  Health Maintenance   Topic Date Due     ANNUAL REVIEW OF HM ORDERS  Never done     Pneumococcal Vaccine: 65+ Years (3 - PCV) 09/12/2015     COLORECTAL CANCER SCREENING  07/19/2021     MEDICARE ANNUAL WELLNESS VISIT  06/22/2022     TSH W/FREE T4 REFLEX  03/17/2023     FALL RISK ASSESSMENT  04/18/2024     ADVANCE CARE PLANNING  06/27/2026     DTAP/TDAP/TD IMMUNIZATION (3 - Td or Tdap) 10/28/2031     DEXA  09/11/2034     PHQ-2 (once per calendar year)  Completed     INFLUENZA VACCINE  Completed     COVID-19 Vaccine  Completed     ZOSTER IMMUNIZATION  Addressed     IPV IMMUNIZATION  Aged Out     MENINGITIS IMMUNIZATION  Aged Out     MAMMO SCREENING  Discontinued     Lab work is in process      Mammogram Screening - Patient over age 75, has elected to continue with screening.  Pertinent mammograms are reviewed under the imaging tab.    Review of Systems   Constitutional: Negative for chills and fever.   HENT: Positive for hearing loss. Negative for congestion, ear pain and sore throat.    Eyes: Positive for visual disturbance. Negative for pain.   Respiratory: Negative for cough and shortness of breath.    Cardiovascular: Negative for chest pain, palpitations and peripheral edema.   Gastrointestinal: Negative for abdominal pain, constipation, diarrhea, heartburn, hematochezia and nausea.   Breasts:  Negative for tenderness, breast mass and discharge.   Genitourinary: Negative for dysuria, frequency, genital sores, hematuria, pelvic pain, urgency, vaginal bleeding and vaginal discharge.   Musculoskeletal: Positive for arthralgias. Negative for joint swelling and myalgias.   Skin: Negative for rash.    Neurological: Positive for dizziness and weakness. Negative for headaches and paresthesias.   Psychiatric/Behavioral: Negative for mood changes. The patient is not nervous/anxious.          OBJECTIVE:   /68   Pulse 105   Temp 98.8  F (37.1  C) (Tympanic)   Resp 18   Ht 1.829 m (6')   Wt 68.2 kg (150 lb 6.4 oz)   LMP  (LMP Unknown)   SpO2 96%   BMI 20.40 kg/m   Estimated body mass index is 20.4 kg/m  as calculated from the following:    Height as of this encounter: 1.829 m (6').    Weight as of this encounter: 68.2 kg (150 lb 6.4 oz).  Physical Exam          ASSESSMENT / PLAN:       ICD-10-CM    1. Routine general medical examination at a health care facility  Z00.00       2. Screen for colon cancer  Z12.11       3. Acquired hypothyroidism  E03.9 TSH WITH FREE T4 REFLEX     OFFICE/OUTPT VISIT,EST,LEVL IV     TSH WITH FREE T4 REFLEX      4. Phlegm in throat  R09.89 fluticasone (FLONASE) 50 MCG/ACT nasal spray     OFFICE/OUTPT VISIT,EST,LEVL IV      5. Muscle weakness (generalized)  M62.81 Physical Therapy Referral     OFFICE/OUTPT VISIT,EST,LEVL IV      6. Neuropathy  G62.9 Adult Neurology  Referral     OFFICE/OUTPT VISIT,EST,LEVL IV      7. Encounter for screening mammogram for breast cancer  Z12.31 *MA Screening Digital Bilateral        Discussion:  Patient comes in today for annual physical.  Additional concerns discussed today include chronic sinusitis type symptoms and neuropathy.  Discussed with the patient on a trial of Flonase therapy for initial control of symptoms.  Discussed with the patient on the recommendation to use of Flonase therapy for at least continued use of 2 weeks. If symptoms are still persistent, can then consider an ENT referral for the same.  As for the muscle weakness, physical therapy referral has been placed.  She has participated in physical therapy before and would like to reestablish care with a team.  As for the neuropathy, patient is interested in following  up with the neurology team.  Referral has been placed today.  Patient has been advised of split billing requirements and indicates understanding: Yes      COUNSELING:       Regular exercise       Healthy diet/nutrition       Vision screening        She reports that she has never smoked. She has never used smokeless tobacco.      Appropriate preventive services were discussed with this patient, including applicable screening as appropriate for cardiovascular disease, diabetes, osteopenia/osteoporosis, and glaucoma.  As appropriate for age/gender, discussed screening for colorectal cancer, prostate cancer, breast cancer, and cervical cancer. Checklist reviewing preventive services available has been given to the patient.    Reviewed patients plan of care and provided an AVS. The Basic Care Plan (routine screening as documented in Health Maintenance) for Tricia meets the Care Plan requirement. This Care Plan has been established and reviewed with the Patient.          Heather Song MD  [facility]    Identified Health Risks:

## 2023-05-02 ENCOUNTER — LAB (OUTPATIENT)
Dept: INFUSION THERAPY | Facility: CLINIC | Age: 83
End: 2023-05-02
Attending: PHYSICIAN ASSISTANT
Payer: MEDICARE

## 2023-05-02 ENCOUNTER — ONCOLOGY VISIT (OUTPATIENT)
Dept: ONCOLOGY | Facility: CLINIC | Age: 83
End: 2023-05-02
Attending: PHYSICIAN ASSISTANT
Payer: MEDICARE

## 2023-05-02 ENCOUNTER — INFUSION THERAPY VISIT (OUTPATIENT)
Dept: INFUSION THERAPY | Facility: CLINIC | Age: 83
End: 2023-05-02
Attending: INTERNAL MEDICINE
Payer: MEDICARE

## 2023-05-02 VITALS
DIASTOLIC BLOOD PRESSURE: 65 MMHG | HEIGHT: 72 IN | OXYGEN SATURATION: 98 % | HEART RATE: 68 BPM | SYSTOLIC BLOOD PRESSURE: 131 MMHG | RESPIRATION RATE: 16 BRPM | BODY MASS INDEX: 20.26 KG/M2 | WEIGHT: 149.6 LBS | TEMPERATURE: 98.4 F

## 2023-05-02 DIAGNOSIS — D59.19 OTHER AUTOIMMUNE HEMOLYTIC ANEMIA (H): ICD-10-CM

## 2023-05-02 DIAGNOSIS — D83.9 CVID (COMMON VARIABLE IMMUNODEFICIENCY) (H): Primary | ICD-10-CM

## 2023-05-02 DIAGNOSIS — D50.9 IRON DEFICIENCY ANEMIA, UNSPECIFIED IRON DEFICIENCY ANEMIA TYPE: ICD-10-CM

## 2023-05-02 DIAGNOSIS — D59.10 AUTOIMMUNE HEMOLYTIC ANEMIA (H): ICD-10-CM

## 2023-05-02 DIAGNOSIS — D59.19 OTHER AUTOIMMUNE HEMOLYTIC ANEMIA (H): Primary | ICD-10-CM

## 2023-05-02 LAB
ALBUMIN SERPL BCG-MCNC: 4.1 G/DL (ref 3.5–5.2)
ALP SERPL-CCNC: 138 U/L (ref 35–104)
ALT SERPL W P-5'-P-CCNC: 13 U/L (ref 10–35)
ANION GAP SERPL CALCULATED.3IONS-SCNC: 8 MMOL/L (ref 7–15)
AST SERPL W P-5'-P-CCNC: 28 U/L (ref 10–35)
BASOPHILS # BLD AUTO: 0 10E3/UL (ref 0–0.2)
BASOPHILS NFR BLD AUTO: 0 %
BILIRUB SERPL-MCNC: 0.6 MG/DL
BUN SERPL-MCNC: 13.8 MG/DL (ref 8–23)
CALCIUM SERPL-MCNC: 8.8 MG/DL (ref 8.8–10.2)
CHLORIDE SERPL-SCNC: 105 MMOL/L (ref 98–107)
CREAT SERPL-MCNC: 0.56 MG/DL (ref 0.51–0.95)
DEPRECATED HCO3 PLAS-SCNC: 26 MMOL/L (ref 22–29)
EOSINOPHIL # BLD AUTO: 0 10E3/UL (ref 0–0.7)
EOSINOPHIL NFR BLD AUTO: 0 %
ERYTHROCYTE [DISTWIDTH] IN BLOOD BY AUTOMATED COUNT: 13.4 % (ref 10–15)
FERRITIN SERPL-MCNC: 36 NG/ML (ref 11–328)
GFR SERPL CREATININE-BSD FRML MDRD: >90 ML/MIN/1.73M2
GLUCOSE SERPL-MCNC: 111 MG/DL (ref 70–99)
HCT VFR BLD AUTO: 33.3 % (ref 35–47)
HGB BLD-MCNC: 11.5 G/DL (ref 11.7–15.7)
IMM GRANULOCYTES # BLD: 0 10E3/UL
IMM GRANULOCYTES NFR BLD: 1 %
IRON BINDING CAPACITY (ROCHE): 309 UG/DL (ref 240–430)
IRON SATN MFR SERPL: 29 % (ref 15–46)
IRON SERPL-MCNC: 89 UG/DL (ref 37–145)
LDH SERPL L TO P-CCNC: 211 U/L (ref 0–250)
LYMPHOCYTES # BLD AUTO: 2.2 10E3/UL (ref 0.8–5.3)
LYMPHOCYTES NFR BLD AUTO: 38 %
MCH RBC QN AUTO: 35.3 PG (ref 26.5–33)
MCHC RBC AUTO-ENTMCNC: 34.5 G/DL (ref 31.5–36.5)
MCV RBC AUTO: 102 FL (ref 78–100)
MONOCYTES # BLD AUTO: 0.6 10E3/UL (ref 0–1.3)
MONOCYTES NFR BLD AUTO: 10 %
NEUTROPHILS # BLD AUTO: 3 10E3/UL (ref 1.6–8.3)
NEUTROPHILS NFR BLD AUTO: 51 %
NRBC # BLD AUTO: 0 10E3/UL
NRBC BLD AUTO-RTO: 0 /100
PLATELET # BLD AUTO: 123 10E3/UL (ref 150–450)
POTASSIUM SERPL-SCNC: 4 MMOL/L (ref 3.4–5.3)
PROT SERPL-MCNC: 6 G/DL (ref 6.4–8.3)
RBC # BLD AUTO: 3.26 10E6/UL (ref 3.8–5.2)
RETICS # AUTO: 0.08 10E6/UL (ref 0.03–0.1)
RETICS/RBC NFR AUTO: 2.5 % (ref 0.5–2)
SODIUM SERPL-SCNC: 139 MMOL/L (ref 136–145)
WBC # BLD AUTO: 5.9 10E3/UL (ref 4–11)

## 2023-05-02 PROCEDURE — 85004 AUTOMATED DIFF WBC COUNT: CPT | Performed by: INTERNAL MEDICINE

## 2023-05-02 PROCEDURE — 36415 COLL VENOUS BLD VENIPUNCTURE: CPT

## 2023-05-02 PROCEDURE — 96413 CHEMO IV INFUSION 1 HR: CPT

## 2023-05-02 PROCEDURE — 82784 ASSAY IGA/IGD/IGG/IGM EACH: CPT | Performed by: INTERNAL MEDICINE

## 2023-05-02 PROCEDURE — 85045 AUTOMATED RETICULOCYTE COUNT: CPT | Performed by: INTERNAL MEDICINE

## 2023-05-02 PROCEDURE — 258N000003 HC RX IP 258 OP 636: Performed by: INTERNAL MEDICINE

## 2023-05-02 PROCEDURE — G0463 HOSPITAL OUTPT CLINIC VISIT: HCPCS | Mod: 25 | Performed by: PHYSICIAN ASSISTANT

## 2023-05-02 PROCEDURE — 80053 COMPREHEN METABOLIC PANEL: CPT | Performed by: INTERNAL MEDICINE

## 2023-05-02 PROCEDURE — 99214 OFFICE O/P EST MOD 30 MIN: CPT | Performed by: PHYSICIAN ASSISTANT

## 2023-05-02 PROCEDURE — 83010 ASSAY OF HAPTOGLOBIN QUANT: CPT | Performed by: INTERNAL MEDICINE

## 2023-05-02 PROCEDURE — 83615 LACTATE (LD) (LDH) ENZYME: CPT | Performed by: INTERNAL MEDICINE

## 2023-05-02 PROCEDURE — 250N000013 HC RX MED GY IP 250 OP 250 PS 637: Performed by: INTERNAL MEDICINE

## 2023-05-02 PROCEDURE — 250N000011 HC RX IP 250 OP 636: Performed by: INTERNAL MEDICINE

## 2023-05-02 PROCEDURE — 83550 IRON BINDING TEST: CPT | Performed by: PHYSICIAN ASSISTANT

## 2023-05-02 PROCEDURE — 82728 ASSAY OF FERRITIN: CPT | Performed by: PHYSICIAN ASSISTANT

## 2023-05-02 RX ORDER — ACETAMINOPHEN 325 MG/1
650 TABLET ORAL ONCE
Status: COMPLETED | OUTPATIENT
Start: 2023-05-02 | End: 2023-05-02

## 2023-05-02 RX ORDER — DIPHENHYDRAMINE HCL 25 MG
50 CAPSULE ORAL ONCE
Status: COMPLETED | OUTPATIENT
Start: 2023-05-02 | End: 2023-05-02

## 2023-05-02 RX ADMIN — DIPHENHYDRAMINE HYDROCHLORIDE 25 MG: 25 CAPSULE ORAL at 14:23

## 2023-05-02 RX ADMIN — RITUXIMAB-ABBS 700 MG: 10 INJECTION, SOLUTION INTRAVENOUS at 14:30

## 2023-05-02 RX ADMIN — SODIUM CHLORIDE 250 ML: 9 INJECTION, SOLUTION INTRAVENOUS at 14:32

## 2023-05-02 RX ADMIN — ACETAMINOPHEN 650 MG: 325 TABLET ORAL at 14:22

## 2023-05-02 ASSESSMENT — PAIN SCALES - GENERAL: PAINLEVEL: NO PAIN (0)

## 2023-05-02 NOTE — PROGRESS NOTES
Nursing Note:  Tricia Sosa presents today for PIV start for labs and treatment.    Patient seen by provider today: Yes: Trinidad   present during visit today: Not Applicable.    Note: N/A.    Intravenous Access:  Lab draw site L lower FA, Needle type angiocath, Gauge 24.  Labs drawn without difficulty.  Peripheral IV placed.    Discharge Plan:   Patient was sent to clinic for PA appointment.    STACY CHEATHAM RN

## 2023-05-02 NOTE — PROGRESS NOTES
Oncology Rooming Note    May 2, 2023 1:33 PM   Tricia Sosa is a 82 year old female who presents for:    Chief Complaint   Patient presents with     Oncology Clinic Visit     Anemia, iron deficiency         Initial Vitals: Resp 16   Ht 1.829 m (6')   Wt 67.9 kg (149 lb 9.6 oz)   LMP  (LMP Unknown)   BMI 20.29 kg/m   Estimated body mass index is 20.29 kg/m  as calculated from the following:    Height as of this encounter: 1.829 m (6').    Weight as of this encounter: 67.9 kg (149 lb 9.6 oz). Body surface area is 1.86 meters squared.  No Pain (0) Comment: Data Unavailable   No LMP recorded (lmp unknown). Patient is postmenopausal.  Allergies reviewed: Yes  Medications reviewed: Yes    Medications: Medication refills not needed today.  Pharmacy name entered into [employer]:    [place], [place] - [address] [id number] [place], [place] - [address] [facility]    Clinical concerns: follow up       Eunice Wiggins

## 2023-05-02 NOTE — LETTER
5/2/2023         RE: Tricia Sosa  [id number]  [facility] [address]        Dear Colleague,    Thank you for referring your patient, Tricia Sosa, to the [facility]. Please see a copy of my visit note below.    Oncology Rooming Note    May 2, 2023 1:33 PM   Tricia Sosa is a 82 year old female who presents for:    Chief Complaint   Patient presents with     Oncology Clinic Visit     Anemia, iron deficiency         Initial Vitals: Resp 16   Ht 1.829 m (6')   Wt 67.9 kg (149 lb 9.6 oz)   LMP  (LMP Unknown)   BMI 20.29 kg/m   Estimated body mass index is 20.29 kg/m  as calculated from the following:    Height as of this encounter: 1.829 m (6').    Weight as of this encounter: 67.9 kg (149 lb 9.6 oz). Body surface area is 1.86 meters squared.  No Pain (0) Comment: Data Unavailable   No LMP recorded (lmp unknown). Patient is postmenopausal.  Allergies reviewed: Yes  Medications reviewed: Yes    Medications: Medication refills not needed today.  Pharmacy name entered into [employer]:    [facility] [place], [place] - [id number] [place] [address] PHARM[id number] [place] [address] - [address] [place] [address]    Clinical concerns: follow up       Eunice Wiggins              Oncology/Hematology Visit Note    May 2, 2023    Reason for visit: Follow up of CVID, autoimmune hemolytic anemia, iron deficiency     Oncology HPI: Tricia Sosa is a 82 year old female female who presents with autoimmune hemolytic anemia and IgA deficiency.  She previously saw Dr. Matos and then Dr. Summers.  She was previously seen at [facility].     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  She was very sick at the time and had severe diarrhea.  She lost about 40 pounds in weight.  The workup revealed that she had markedly diminished CD4 counts of 48 and was diagnosed with CMV colitis.  Since then she has been followed in hematology clinic at [facility]  until recently.  She was noted to have anemia which was fairly long-standing.  She was initially referred for anemia in 2004 and also had a bone marrow biopsy done at that time which revealed hypercellular bone marrow with 70-80% cellularity and normal trilineage hematopoiesis and no morphologic features of MDS or lymphoproliferation.  Her anemia was attributed to her chronic underlying disease.  At the next evaluation in 2002 on 4 aggressive anemia there was no evidence of hemolysis, iron deficiency, B12 or folate deficiency.  Bone marrow biopsy was not pursued.  In summer of 2015 she had progressive fatigue, dyspnea on exertion and worsening anemia with a hemoglobin now of 9.  Workup at this time did suggest a hemolytic anemia with elevated LDH at 709, undetectable haptoglobin and elevated unconjugated bilirubin at 3.3.  Monospecific MARIKA was positive for both IgG and complement.  Cold agglutinin screen was positive with very low titer 1:64.  She was started on prednisone 60 mg daily with supplementation of iron folate and B12 starting 7/31/15.  Her prednisone has been slowly tapered and was discontinued off in January 2016.  She was last followed at [facility] on March 10, 2016 when she had stable hemoglobin.     She was followed by Dr. Matos and Dr. Summers.  Due to relapse of hemolytic anemia, she underwent another course of prednisone that has since been tapered off and rituximab x 4 weekly doses completed in 9/19/19.     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  She started weekly Rituxan on 7/31/20 x 4 doses, completed on 8/21/20.  She tapered off of prednisone in October 2020.     Due to relapse of her AIHA, she started prednisone again on 6/8/21 at 60 mg daily with slow taper and finished taper in August 2021.     Due to relapse of AIHA again, she started prednisone again on 11/2/21 at 70 mg daily with slow taper.  She also completed weekly  Rituxan again x 4 doses 12/6/21-12/29/21.     Venofer 5/5-5/19/22.     She is now on Rituxan every 2 months and IVIG monthly if IgG <600.    Interval History: Tricia is here today and doing pretty well.  She is having intermittent cold symptoms, like allergy symptoms, over the last 6 months.  She had a virtual visit but 2 weeks ago was started on Flonase.  She has just a few days left of that.  Symptoms are improved.  She continues to tolerate rituximab pretty well with no significant side effects.  No vomiting or diarrhea, bleeding or fever.    Review of Systems: See interval hx. Denies fevers, chills, HA, dizziness, n/t, changes in vision, CP, SOB, abdominal pain, N/V, diarrhea, changes in urination, bleeding, bruising, rash.     PMHx and Social Hx reviewed per EPIC.      Medications:  Current Outpatient Medications   Medication Sig Dispense Refill     cyanocobalamin (VITAMIN  B-12) 1000 MCG tablet   3     fluticasone (FLONASE) 50 MCG/ACT nasal spray Spray 1 spray into both nostrils daily 11.1 mL 1     folic acid (FOLVITE) 1 MG tablet   3     levothyroxine (SYNTHROID/LEVOTHROID) 150 MCG tablet Take 1 tablet  by mouth daily 90 tablet 0       Allergies   Allergen Reactions     Doxycycline        EXAM:    /65   Pulse 68   Temp 98.4  F (36.9  C) (Oral)   Resp 16   Ht 1.829 m (6')   Wt 67.9 kg (149 lb 9.6 oz)   LMP  (LMP Unknown)   SpO2 98%   BMI 20.29 kg/m      GENERAL:  Female, in no acute distress.  Alert and oriented x3.   HEENT:  Normocephalic, atraumatic.  PERRL, oropharynx clear with no sores or thrush.   LYMPH NODES:  No palpable pre/post-auricular, cervical, axillary lymphadenopathy appreciated.  CV:  RRR, No murmurs, gallops, or rubs.   LUNGS:  Clear to auscultation bilaterally.   ABDOMEN:  Soft, nontender and nondistended.  Bowel sounds heard x4.  No apparent hepatosplenomegaly.   EXTREMITIES:  No clubbing, cyanosis, or edema.   SKIN: No rash  PSYCH: Calm and cooperative       Labs:    05/02/23  12:50   Sodium 139   Potassium 4.0   Chloride 105   Carbon Dioxide (CO2) 26   Urea Nitrogen 13.8   Creatinine 0.56   GFR Estimate >90   Calcium 8.8   Anion Gap 8   Albumin 4.1   Protein Total 6.0 (L)   Alkaline Phosphatase 138 (H)   ALT 13   AST 28   Bilirubin Total 0.6   Glucose 111 (H)   Iron 89   Iron Binding Capacity 309   Iron Sat Index 29   Lactate Dehydrogenase 211   WBC 5.9   Hemoglobin 11.5 (L)   Hematocrit 33.3 (L)   Platelet Count 123 (L)   RBC Count 3.26 (L)    (H)   MCH 35.3 (H)   MCHC 34.5   RDW 13.4   % Neutrophils 51   % Lymphocytes 38   % Monocytes 10   % Eosinophils 0   % Basophils 0   Absolute Basophils 0.0   Absolute Eosinophils 0.0   Absolute Immature Granulocytes 0.0   Absolute Lymphocytes 2.2   Absolute Monocytes 0.6   % Immature Granulocytes 1   Absolute Neutrophils 3.0   Absolute NRBCs 0.0   NRBCs per 100 WBC 0   % Retic 2.5 (H)   Absolute Retic 0.081     Imaging: n/a    Assessment and Plan:    Warm Autoimmune Hemolytic Anemia: (positive MARIKA to IgG and complement). Has had multiple relapses, most recently November 2021. She completed prolonged prednisone taper and Rituxan. She is off prednisone now. Has bactrim when she is on prednisone.      Hgb is slightly lower today, 11.5, however hemolysis labs stable (LDH WNL, bili WNL, absolute retic WNL, haptoglobin unchanged and pending today). Will continue Rituxan every 2 months.      Continue monthly CBC monitoring. Thrombocytopenia waxing/wanning. today 123K, no bleeding.     CVID: Follows with immunology as well. Doing IVIG monthly when IGG level is <600.  on 4/10, pending today. Last given 4/14, due 6/9 if within parameters.    EVI: Prior ferritin was low at 6 in April 2022. S/p Venofer x 3. Iron WNL and ferritin pending.       Chart documentation with Dragon Voice recognition Software. Although reviewed after completion, some words and grammatical errors may remain.    30 minutes spent on the date of the encounter doing chart  review, review of test results, interpretation of tests, patient visit and documentation     Trinidad Liu PA-C  Hematology/Oncology  [facility]                    Again, thank you for allowing me to participate in the care of your patient.        Sincerely,        Trinidad Liu PA-C

## 2023-05-02 NOTE — PROGRESS NOTES
Infusion Nursing Note:  Tricia Sosa presents today for C6D1 Rituxan.    Patient seen by provider today: No   present during visit today: Not Applicable.    Note: Patient only takes 25mg of the Benadryl pre med.      Intravenous Access:  Peripheral IV placed by FT RN    Treatment Conditions:  Not Applicable.      Post Infusion Assessment:  Patient tolerated infusion without incident.  Blood return noted pre and post infusion.  Site patent and intact, free from redness, edema or discomfort.  No evidence of extravasations.  Access discontinued per protocol.       Discharge Plan:   Discharge instructions reviewed with: Patient.  Patient and/or family verbalized understanding of discharge instructions and all questions answered.  AVS to patient via [employer].  Patient will return in 2 months for next appointment.   Patient discharged in stable condition accompanied by: self.  Departure Mode: Ambulatory.      Cece Pressley RN

## 2023-05-02 NOTE — PROGRESS NOTES
Oncology/Hematology Visit Note    May 2, 2023    Reason for visit: Follow up of CVID, autoimmune hemolytic anemia, iron deficiency     Oncology HPI: Tricia Sosa is a 82 year old female female who presents with autoimmune hemolytic anemia and IgA deficiency.  She previously saw Dr. Matos and then Dr. Summers.  She was previously seen at [facility].     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  She was very sick at the time and had severe diarrhea.  She lost about 40 pounds in weight.  The workup revealed that she had markedly diminished CD4 counts of 48 and was diagnosed with CMV colitis.  Since then she has been followed in hematology clinic at [place] until recently.  She was noted to have anemia which was fairly long-standing.  She was initially referred for anemia in 2004 and also had a bone marrow biopsy done at that time which revealed hypercellular bone marrow with 70-80% cellularity and normal trilineage hematopoiesis and no morphologic features of MDS or lymphoproliferation.  Her anemia was attributed to her chronic underlying disease.  At the next evaluation in 2002 on 4 aggressive anemia there was no evidence of hemolysis, iron deficiency, B12 or folate deficiency.  Bone marrow biopsy was not pursued.  In summer of 2015 she had progressive fatigue, dyspnea on exertion and worsening anemia with a hemoglobin now of 9.  Workup at this time did suggest a hemolytic anemia with elevated LDH at 709, undetectable haptoglobin and elevated unconjugated bilirubin at 3.3.  Monospecific MARIKA was positive for both IgG and complement.  Cold agglutinin screen was positive with very low titer 1:64.  She was started on prednisone 60 mg daily with supplementation of iron folate and B12 starting 7/31/15.  Her prednisone has been slowly tapered and was discontinued off in January 2016.  She was last followed at [facility] on March 10, 2016 when she had stable hemoglobin.     She was  followed by Dr. Matos and Dr. Summers.  Due to relapse of hemolytic anemia, she underwent another course of prednisone that has since been tapered off and rituximab x 4 weekly doses completed in 9/19/19.     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  She started weekly Rituxan on 7/31/20 x 4 doses, completed on 8/21/20.  She tapered off of prednisone in October 2020.     Due to relapse of her AIHA, she started prednisone again on 6/8/21 at 60 mg daily with slow taper and finished taper in August 2021.     Due to relapse of AIHA again, she started prednisone again on 11/2/21 at 70 mg daily with slow taper.  She also completed weekly Rituxan again x 4 doses 12/6/21-12/29/21.     Venofer 5/5-5/19/22.     She is now on Rituxan every 2 months and IVIG monthly if IgG <600.    Interval History: Tricia is here today and doing pretty well.  She is having intermittent cold symptoms, like allergy symptoms, over the last 6 months.  She had a virtual visit but 2 weeks ago was started on Flonase.  She has just a few days left of that.  Symptoms are improved.  She continues to tolerate rituximab pretty well with no significant side effects.  No vomiting or diarrhea, bleeding or fever.    Review of Systems: See interval hx. Denies fevers, chills, HA, dizziness, n/t, changes in vision, CP, SOB, abdominal pain, N/V, diarrhea, changes in urination, bleeding, bruising, rash.     PMHx and Social Hx reviewed per EPIC.      Medications:  Current Outpatient Medications   Medication Sig Dispense Refill     cyanocobalamin (VITAMIN  B-12) 1000 MCG tablet   3     fluticasone (FLONASE) 50 MCG/ACT nasal spray Spray 1 spray into both nostrils daily 11.1 mL 1     folic acid (FOLVITE) 1 MG tablet   3     levothyroxine (SYNTHROID/LEVOTHROID) 150 MCG tablet Take 1 tablet  by mouth daily 90 tablet 0       Allergies   Allergen Reactions     Doxycycline        EXAM:    /65   Pulse 68    Temp 98.4  F (36.9  C) (Oral)   Resp 16   Ht 1.829 m (6')   Wt 67.9 kg (149 lb 9.6 oz)   LMP  (LMP Unknown)   SpO2 98%   BMI 20.29 kg/m      GENERAL:  Female, in no acute distress.  Alert and oriented x3.   HEENT:  Normocephalic, atraumatic.  PERRL, oropharynx clear with no sores or thrush.   LYMPH NODES:  No palpable pre/post-auricular, cervical, axillary lymphadenopathy appreciated.  CV:  RRR, No murmurs, gallops, or rubs.   LUNGS:  Clear to auscultation bilaterally.   ABDOMEN:  Soft, nontender and nondistended.  Bowel sounds heard x4.  No apparent hepatosplenomegaly.   EXTREMITIES:  No clubbing, cyanosis, or edema.   SKIN: No rash  PSYCH: Calm and cooperative       Labs:    05/02/23 12:50   Sodium 139   Potassium 4.0   Chloride 105   Carbon Dioxide (CO2) 26   Urea Nitrogen 13.8   Creatinine 0.56   GFR Estimate >90   Calcium 8.8   Anion Gap 8   Albumin 4.1   Protein Total 6.0 (L)   Alkaline Phosphatase 138 (H)   ALT 13   AST 28   Bilirubin Total 0.6   Glucose 111 (H)   Iron 89   Iron Binding Capacity 309   Iron Sat Index 29   Lactate Dehydrogenase 211   WBC 5.9   Hemoglobin 11.5 (L)   Hematocrit 33.3 (L)   Platelet Count 123 (L)   RBC Count 3.26 (L)    (H)   MCH 35.3 (H)   MCHC 34.5   RDW 13.4   % Neutrophils 51   % Lymphocytes 38   % Monocytes 10   % Eosinophils 0   % Basophils 0   Absolute Basophils 0.0   Absolute Eosinophils 0.0   Absolute Immature Granulocytes 0.0   Absolute Lymphocytes 2.2   Absolute Monocytes 0.6   % Immature Granulocytes 1   Absolute Neutrophils 3.0   Absolute NRBCs 0.0   NRBCs per 100 WBC 0   % Retic 2.5 (H)   Absolute Retic 0.081     Imaging: n/a    Assessment and Plan:    Warm Autoimmune Hemolytic Anemia: (positive MARIKA to IgG and complement). Has had multiple relapses, most recently November 2021. She completed prolonged prednisone taper and Rituxan. She is off prednisone now. Has bactrim when she is on prednisone.      Hgb is slightly lower today, 11.5, however hemolysis  labs stable (LDH WNL, bili WNL, absolute retic WNL, haptoglobin unchanged and pending today). Will continue Rituxan every 2 months.      Continue monthly CBC monitoring. Thrombocytopenia waxing/wanning. today 123K, no bleeding.     CVID: Follows with immunology as well. Doing IVIG monthly when IGG level is <600.  on 4/10, pending today. Last given 4/14, due 6/9 if within parameters.    EVI: Prior ferritin was low at 6 in April 2022. S/p Venofer x 3. Iron WNL and ferritin pending.       Chart documentation with Dragon Voice recognition Software. Although reviewed after completion, some words and grammatical errors may remain.    30 minutes spent on the date of the encounter doing chart review, review of test results, interpretation of tests, patient visit and documentation     Trinidad Liu PA-C  Hematology/Oncology  [facility]

## 2023-05-03 LAB
HAPTOGLOB SERPL-MCNC: <3 MG/DL (ref 32–197)
IGA SERPL-MCNC: <2 MG/DL (ref 84–499)
IGG SERPL-MCNC: 638 MG/DL (ref 610–1616)
IGM SERPL-MCNC: 11 MG/DL (ref 35–242)

## 2023-05-16 ENCOUNTER — VIRTUAL VISIT (OUTPATIENT)
Dept: ONCOLOGY | Facility: CLINIC | Age: 83
End: 2023-05-16
Attending: STUDENT IN AN ORGANIZED HEALTH CARE EDUCATION/TRAINING PROGRAM
Payer: MEDICARE

## 2023-05-16 DIAGNOSIS — M62.81 GENERALIZED MUSCLE WEAKNESS: ICD-10-CM

## 2023-05-16 DIAGNOSIS — D59.10 AUTOIMMUNE HEMOLYTIC ANEMIA (H): Primary | ICD-10-CM

## 2023-05-16 DIAGNOSIS — R53.83 OTHER FATIGUE: ICD-10-CM

## 2023-05-16 PROCEDURE — 99215 OFFICE O/P EST HI 40 MIN: CPT | Mod: VID | Performed by: STUDENT IN AN ORGANIZED HEALTH CARE EDUCATION/TRAINING PROGRAM

## 2023-05-16 NOTE — PROGRESS NOTES
Virtual Visit Details    Type of service:  Video Visit   Video Start Time: 2:05 PM  Video End Time:2:30 PM    Originating Location (pt. Location): Home  Distant Location (provider location):  On-site  Platform used for Video Visit: [employer]

## 2023-05-16 NOTE — PROGRESS NOTES
[facility]   PM&R clinic note        Interval history:     Tricia Sosa presents to clinic today for follow up reg her rehab needs.   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  Was last seen in clinic on 11/15/22.  Recommendations included:  1. Work-up: Hold off EMG  2. Therapy/equipment/braces:    - Patient is encouraged to look for alternative TSLO for spinal support.  3. Medications: No new updates today   4. Interventions: None today  5. Referral / follow up with other providers: None today   Follow up: in 6 mo with Dr. Sly Youngblood,  Patient was seen today for a return virtual visit.  She is still struggling with her balance, there has been no change. She is not wearing a back brace, but is thinking of getting one because her back feels fatigued especially when she is doing outdoor tasks.  She is scheduled to see Neurology, [facility] in [place] in the beginning of June for further evaluation of her weakness and balance difficulties.  She has been doing her home exercises diligently.       Therapies/HEP,  Not participating in outpatient physical therapy currently.      Functionally,   Stable, no changes since last visit.      Social history is unchanged.        Medications:  Current Outpatient Medications   Medication Sig Dispense Refill     cyanocobalamin (VITAMIN  B-12) 1000 MCG tablet   3     fluticasone (FLONASE) 50 MCG/ACT nasal spray Spray 1 spray into both nostrils daily 11.1 mL 1     folic acid (FOLVITE) 1 MG tablet   3     levothyroxine (SYNTHROID/LEVOTHROID) 150 MCG tablet Take 1 tablet  by mouth daily 90 tablet 0              Physical Exam:   LMP  (LMP Unknown)   Gen: NAD, pleasant and cooperative   HEENT: Atraumatic, normocephalic, extraocular movements appear intact  Pulm: non-labored breathing in room air  Neuro/MSK:   Orientation: Oriented to person, time, place and situation,  Exhibits good insight into her condition and ongoing treatment  Motor: Observed moving upper extremities actively against gravity    Labs/Imaging:  Lab Results   Component Value Date    WBC 5.9 05/02/2023    HGB 11.5 (L) 05/02/2023    HCT 33.3 (L) 05/02/2023     (H) 05/02/2023     (L) 05/02/2023     Lab Results   Component Value Date     05/02/2023    POTASSIUM 4.0 05/02/2023    CHLORIDE 105 05/02/2023    CO2 26 05/02/2023     (H) 05/02/2023     Lab Results   Component Value Date    GFRESTIMATED >90 05/02/2023    GFRESTBLACK >90 06/29/2021     Lab Results   Component Value Date    AST 28 05/02/2023    ALT 13 05/02/2023    ALKPHOS 138 (H) 05/02/2023    BILITOTAL 0.6 05/02/2023    BILIDIRECT 0.30 05/19/2000     No results found for: INR  Lab Results   Component Value Date    BUN 13.8 05/02/2023    CR 0.56 05/02/2023              Assessment/Plan   Tricia Sosa presents to clinic today for follow up reg her rehab needs.   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  Was last seen in clinic on 11/15/22.  As discussed with Tricia, she would benefit from seeing outpatient physical therapy to help with strength, gait and endurance in the setting of her muscle weakness and balance difficulties.  This referral was placed today.  We also discussed options for protein supplementation, and she should start aiming for an extra 30 to 60 g of protein daily.  Recommendations were given to her at today's visit.  She should follow-up with neurology as planned for further evaluation and management of her balance, including a possible lower extremity EMG.  She plans to proceed with obtaining a soft back brace to help with better support, especially with outdoor activities.  We will plan a return virtual visit in 6 to 8 months.  Maandrew is in agreement with this plan.      1. Therapy/equipment/braces:  1. Outpatient physical therapy referral placed for strength and  balance.  2. Start 1-2 protein shakes daily.  Can try either Premier protein shakes or Nestlé fair life core power shakes.  Aiming for an extra 30 to 60 g of protein daily.  3. Obtain soft back brace as planned for support with activities.  2. Referral / follow up with other providers:  1. Follow-up with neurology as planned for further evaluation and management of balance difficulties.  3. Follow up: 6 to 8 months.      Marley Heart MD  Physical Medicine & Rehabilitation      50 minutes spent on the date of the encounter doing chart review, history and exam, documentation and further activities as noted above.

## 2023-05-16 NOTE — LETTER
5/16/2023         RE: Tricia Sosa  [address]  [place] [address]        Dear Colleague,    Thank you for referring your patient, Tricia Sosa, to the [facility]. Please see a copy of my visit note below.    Virtual Visit Details    Type of service:  Video Visit   Video Start Time: 2:05 PM  Video End Time:2:30 PM    Originating Location (pt. Location): Home  Distant Location (provider location):  On-site  Platform used for Video Visit: [facility]   PM&R clinic note        Interval history:     Tricia Sosa presents to clinic today for follow up reg her rehab needs.   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  Was last seen in clinic on 11/15/22.  Recommendations included:  1. Work-up: Hold off EMG  2. Therapy/equipment/braces:    - Patient is encouraged to look for alternative TSLO for spinal support.  3. Medications: No new updates today   4. Interventions: None today  5. Referral / follow up with other providers: None today   Follow up: in 6 mo with Dr. Sly Youngblood,  Patient was seen today for a return virtual visit.  She is still struggling with her balance, there has been no change. She is not wearing a back brace, but is thinking of getting one because her back feels fatigued especially when she is doing outdoor tasks.  She is scheduled to see Neurology, [facility] in [place] in the beginning of June for further evaluation of her weakness and balance difficulties.  She has been doing her home exercises diligently.       Therapies/HEP,  Not participating in outpatient physical therapy currently.      Functionally,   Stable, no changes since last visit.      Social history is unchanged.        Medications:  Current Outpatient Medications   Medication Sig Dispense Refill     cyanocobalamin (VITAMIN  B-12) 1000 MCG tablet   3     fluticasone  (FLONASE) 50 MCG/ACT nasal spray Spray 1 spray into both nostrils daily 11.1 mL 1     folic acid (FOLVITE) 1 MG tablet   3     levothyroxine (SYNTHROID/LEVOTHROID) 150 MCG tablet Take 1 tablet  by mouth daily 90 tablet 0              Physical Exam:   LMP  (LMP Unknown)   Gen: NAD, pleasant and cooperative   HEENT: Atraumatic, normocephalic, extraocular movements appear intact  Pulm: non-labored breathing in room air  Neuro/MSK:   Orientation: Oriented to person, time, place and situation, Exhibits good insight into her condition and ongoing treatment  Motor: Observed moving upper extremities actively against gravity    Labs/Imaging:  Lab Results   Component Value Date    WBC 5.9 05/02/2023    HGB 11.5 (L) 05/02/2023    HCT 33.3 (L) 05/02/2023     (H) 05/02/2023     (L) 05/02/2023     Lab Results   Component Value Date     05/02/2023    POTASSIUM 4.0 05/02/2023    CHLORIDE 105 05/02/2023    CO2 26 05/02/2023     (H) 05/02/2023     Lab Results   Component Value Date    GFRESTIMATED >90 05/02/2023    GFRESTBLACK >90 06/29/2021     Lab Results   Component Value Date    AST 28 05/02/2023    ALT 13 05/02/2023    ALKPHOS 138 (H) 05/02/2023    BILITOTAL 0.6 05/02/2023    BILIDIRECT 0.30 05/19/2000     No results found for: INR  Lab Results   Component Value Date    BUN 13.8 05/02/2023    CR 0.56 05/02/2023              Assessment/Plan   Tricia JUNIOR Ian presents to clinic today for follow up reg her rehab needs.   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  Was last seen in clinic on 11/15/22.  As discussed with Tricia, she would benefit from seeing outpatient physical therapy to help with strength, gait and endurance in the setting of her muscle weakness and balance difficulties.  This referral was placed today.  We also discussed options for protein supplementation, and she should start aiming for an extra 30 to 60 g of protein daily.  Recommendations were  given to her at today's visit.  She should follow-up with neurology as planned for further evaluation and management of her balance, including a possible lower extremity EMG.  She plans to proceed with obtaining a soft back brace to help with better support, especially with outdoor activities.  We will plan a return virtual visit in 6 to 8 months.  Tricia is in agreement with this plan.      1. Therapy/equipment/braces:  1. Outpatient physical therapy referral placed for strength and balance.  2. Start 1-2 protein shakes daily.  Can try either Premier protein shakes or Nestlé fair life core power shakes.  Aiming for an extra 30 to 60 g of protein daily.  3. Obtain soft back brace as planned for support with activities.  2. Referral / follow up with other providers:  1. Follow-up with neurology as planned for further evaluation and management of balance difficulties.  3. Follow up: 6 to 8 months.      Marley Heart MD  Physical Medicine & Rehabilitation      50 minutes spent on the date of the encounter doing chart review, history and exam, documentation and further activities as noted above.              Again, thank you for allowing me to participate in the care of your patient.        Sincerely,        Marley Heart MD

## 2023-05-16 NOTE — LETTER
5/16/2023         RE: Tricia Sosa  [address]  [place] [address]        Dear Colleague,    Thank you for referring your patient, Tricia Sosa, to the [facility]. Please see a copy of my visit note below.    Virtual Visit Details    Type of service:  Video Visit   Video Start Time: 2:05 PM  Video End Time:2:30 PM    Originating Location (pt. Location): Home  Distant Location (provider location):  On-site  Platform used for Video Visit: [facility]   PM&R clinic note        Interval history:     Tricia Sosa presents to clinic today for follow up reg her rehab needs.   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  Was last seen in clinic on 11/15/22.  Recommendations included:  1. Work-up: Hold off EMG  2. Therapy/equipment/braces:    - Patient is encouraged to look for alternative TSLO for spinal support.  3. Medications: No new updates today   4. Interventions: None today  5. Referral / follow up with other providers: None today   Follow up: in 6 mo with Dr. Sly Youngblood,  Patient was seen today for a return virtual visit.  She is still struggling with her balance, there has been no change. She is not wearing a back brace, but is thinking of getting one because her back feels fatigued especially when she is doing outdoor tasks.  She is scheduled to see Neurology, [facility] in [place] in the beginning of June for further evaluation of her weakness and balance difficulties.  She has been doing her home exercises diligently.       Therapies/HEP,  Not participating in outpatient physical therapy currently.      Functionally,   Stable, no changes since last visit.      Social history is unchanged.        Medications:  Current Outpatient Medications   Medication Sig Dispense Refill     cyanocobalamin (VITAMIN  B-12) 1000 MCG tablet   3     fluticasone  (FLONASE) 50 MCG/ACT nasal spray Spray 1 spray into both nostrils daily 11.1 mL 1     folic acid (FOLVITE) 1 MG tablet   3     levothyroxine (SYNTHROID/LEVOTHROID) 150 MCG tablet Take 1 tablet  by mouth daily 90 tablet 0              Physical Exam:   LMP  (LMP Unknown)   Gen: NAD, pleasant and cooperative   HEENT: Atraumatic, normocephalic, extraocular movements appear intact  Pulm: non-labored breathing in room air  Neuro/MSK:   Orientation: Oriented to person, time, place and situation, Exhibits good insight into her condition and ongoing treatment  Motor: Observed moving upper extremities actively against gravity    Labs/Imaging:  Lab Results   Component Value Date    WBC 5.9 05/02/2023    HGB 11.5 (L) 05/02/2023    HCT 33.3 (L) 05/02/2023     (H) 05/02/2023     (L) 05/02/2023     Lab Results   Component Value Date     05/02/2023    POTASSIUM 4.0 05/02/2023    CHLORIDE 105 05/02/2023    CO2 26 05/02/2023     (H) 05/02/2023     Lab Results   Component Value Date    GFRESTIMATED >90 05/02/2023    GFRESTBLACK >90 06/29/2021     Lab Results   Component Value Date    AST 28 05/02/2023    ALT 13 05/02/2023    ALKPHOS 138 (H) 05/02/2023    BILITOTAL 0.6 05/02/2023    BILIDIRECT 0.30 05/19/2000     No results found for: INR  Lab Results   Component Value Date    BUN 13.8 05/02/2023    CR 0.56 05/02/2023              Assessment/Plan   Tricia JUNIOR Ian presents to clinic today for follow up reg her rehab needs.   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  Was last seen in clinic on 11/15/22.  As discussed with Tricia, she would benefit from seeing outpatient physical therapy to help with strength, gait and endurance in the setting of her muscle weakness and balance difficulties.  This referral was placed today.  We also discussed options for protein supplementation, and she should start aiming for an extra 30 to 60 g of protein daily.  Recommendations were  given to her at today's visit.  She should follow-up with neurology as planned for further evaluation and management of her balance, including a possible lower extremity EMG.  She plans to proceed with obtaining a soft back brace to help with better support, especially with outdoor activities.  We will plan a return virtual visit in 6 to 8 months.  Tricia is in agreement with this plan.      1. Therapy/equipment/braces:  1. Outpatient physical therapy referral placed for strength and balance.  2. Start 1-2 protein shakes daily.  Can try either Premier protein shakes or Nestlé fair life core power shakes.  Aiming for an extra 30 to 60 g of protein daily.  3. Obtain soft back brace as planned for support with activities.  2. Referral / follow up with other providers:  1. Follow-up with neurology as planned for further evaluation and management of balance difficulties.  3. Follow up: 6 to 8 months.      Marley Heart MD  Physical Medicine & Rehabilitation      50 minutes spent on the date of the encounter doing chart review, history and exam, documentation and further activities as noted above.              Again, thank you for allowing me to participate in the care of your patient.        Sincerely,        Marley Heart MD

## 2023-05-16 NOTE — NURSING NOTE
Is the patient currently in the state of [place]? YES    Visit mode:VIDEO    If the visit is dropped, the patient can be reconnected by: VIDEO VISIT: Send to e-mail at: [email or web address]    Will anyone else be joining the visit? NO      How would you like to obtain your AVS? MyChart    Are changes needed to the allergy or medication list? NO    Reason for visit: Video Visit      JIM Kan

## 2023-05-16 NOTE — PATIENT INSTRUCTIONS
1.  An outpatient physical therapy will referral was placed to work on your strength and balance.  2.  Keep your scheduled appointment with neurology in June.  3.  Start 1-2 protein shakes daily.  You can try either Premier protein shakes or Nestlé fair life core power protein shakes.  Aim for an extra 30 to 60 g of protein daily.  4.  Obtain a soft back brace as planned to help support you with activities, especially outdoors.  5.  Follow-up with Dr. Heart for virtual visit in 6 to 8 months.

## 2023-05-22 ENCOUNTER — MYC MEDICAL ADVICE (OUTPATIENT)
Dept: INTERNAL MEDICINE | Facility: CLINIC | Age: 83
End: 2023-05-22
Payer: MEDICARE

## 2023-05-24 DIAGNOSIS — J32.8 OTHER CHRONIC SINUSITIS: Primary | ICD-10-CM

## 2023-05-24 NOTE — TELEPHONE ENCOUNTER
Patient calling. Waiting for advise. She has been sick with colds on and off for a year. Please advise.  Ok to call and  [phone]

## 2023-05-24 NOTE — TELEPHONE ENCOUNTER
Please let patient know that an ENT referral has been placed just as we had discussed during the in office visit.  The team will be reaching out to her to get the visit scheduled.  Thank you

## 2023-05-26 NOTE — TELEPHONE ENCOUNTER
Patient called in to check status of a message from the .     Patient was informed of ENT referral and number to call was given to patient to make and appointment.

## 2023-06-07 ENCOUNTER — LAB (OUTPATIENT)
Dept: ONCOLOGY | Facility: CLINIC | Age: 83
End: 2023-06-07
Attending: INTERNAL MEDICINE
Payer: MEDICARE

## 2023-06-07 ENCOUNTER — TRANSFERRED RECORDS (OUTPATIENT)
Dept: HEALTH INFORMATION MANAGEMENT | Facility: CLINIC | Age: 83
End: 2023-06-07

## 2023-06-07 DIAGNOSIS — D59.10 AUTOIMMUNE HEMOLYTIC ANEMIA (H): ICD-10-CM

## 2023-06-07 LAB
ALBUMIN SERPL BCG-MCNC: 4.5 G/DL (ref 3.5–5.2)
ALP SERPL-CCNC: 134 U/L (ref 35–104)
ALT SERPL W P-5'-P-CCNC: 16 U/L (ref 10–35)
ANION GAP SERPL CALCULATED.3IONS-SCNC: 9 MMOL/L (ref 7–15)
AST SERPL W P-5'-P-CCNC: 30 U/L (ref 10–35)
BASOPHILS # BLD AUTO: 0 10E3/UL (ref 0–0.2)
BASOPHILS NFR BLD AUTO: 0 %
BILIRUB SERPL-MCNC: 0.7 MG/DL
BUN SERPL-MCNC: 21.4 MG/DL (ref 8–23)
CALCIUM SERPL-MCNC: 8.8 MG/DL (ref 8.8–10.2)
CHLORIDE SERPL-SCNC: 104 MMOL/L (ref 98–107)
CREAT SERPL-MCNC: 0.65 MG/DL (ref 0.51–0.95)
DEPRECATED HCO3 PLAS-SCNC: 28 MMOL/L (ref 22–29)
EOSINOPHIL # BLD AUTO: 0 10E3/UL (ref 0–0.7)
EOSINOPHIL NFR BLD AUTO: 0 %
ERYTHROCYTE [DISTWIDTH] IN BLOOD BY AUTOMATED COUNT: 13.8 % (ref 10–15)
GFR SERPL CREATININE-BSD FRML MDRD: 87 ML/MIN/1.73M2
GLUCOSE SERPL-MCNC: 81 MG/DL (ref 70–99)
HCT VFR BLD AUTO: 36.2 % (ref 35–47)
HGB BLD-MCNC: 12.1 G/DL (ref 11.7–15.7)
IMM GRANULOCYTES # BLD: 0 10E3/UL
IMM GRANULOCYTES NFR BLD: 0 %
LDH SERPL L TO P-CCNC: 251 U/L (ref 0–250)
LYMPHOCYTES # BLD AUTO: 2 10E3/UL (ref 0.8–5.3)
LYMPHOCYTES NFR BLD AUTO: 38 %
MCH RBC QN AUTO: 34.6 PG (ref 26.5–33)
MCHC RBC AUTO-ENTMCNC: 33.4 G/DL (ref 31.5–36.5)
MCV RBC AUTO: 103 FL (ref 78–100)
MONOCYTES # BLD AUTO: 0.6 10E3/UL (ref 0–1.3)
MONOCYTES NFR BLD AUTO: 11 %
NEUTROPHILS # BLD AUTO: 2.6 10E3/UL (ref 1.6–8.3)
NEUTROPHILS NFR BLD AUTO: 51 %
NRBC # BLD AUTO: 0 10E3/UL
NRBC BLD AUTO-RTO: 0 /100
PLATELET # BLD AUTO: 156 10E3/UL (ref 150–450)
POTASSIUM SERPL-SCNC: 4.3 MMOL/L (ref 3.4–5.3)
PROT SERPL-MCNC: 6.1 G/DL (ref 6.4–8.3)
RBC # BLD AUTO: 3.5 10E6/UL (ref 3.8–5.2)
RETICS # AUTO: 0.08 10E6/UL (ref 0.03–0.1)
RETICS/RBC NFR AUTO: 2.3 % (ref 0.5–2)
SODIUM SERPL-SCNC: 141 MMOL/L (ref 136–145)
WBC # BLD AUTO: 5.2 10E3/UL (ref 4–11)

## 2023-06-07 PROCEDURE — 83010 ASSAY OF HAPTOGLOBIN QUANT: CPT | Performed by: INTERNAL MEDICINE

## 2023-06-07 PROCEDURE — 82784 ASSAY IGA/IGD/IGG/IGM EACH: CPT | Performed by: INTERNAL MEDICINE

## 2023-06-07 PROCEDURE — 85045 AUTOMATED RETICULOCYTE COUNT: CPT | Performed by: INTERNAL MEDICINE

## 2023-06-07 PROCEDURE — 36415 COLL VENOUS BLD VENIPUNCTURE: CPT

## 2023-06-07 PROCEDURE — 83615 LACTATE (LD) (LDH) ENZYME: CPT | Performed by: INTERNAL MEDICINE

## 2023-06-07 PROCEDURE — 80053 COMPREHEN METABOLIC PANEL: CPT | Performed by: INTERNAL MEDICINE

## 2023-06-07 PROCEDURE — 85014 HEMATOCRIT: CPT | Performed by: INTERNAL MEDICINE

## 2023-06-07 NOTE — PROGRESS NOTES
Medical Assistant Note:  Tricia Sosa presents today for blood draw.    Patient seen by provider today: No.   present during visit today: Not Applicable.    Concerns: No Concerns.    Procedure:  Lab draw site: rt antecub, Needle type: butterfly, Gauge: 23.    Post Assessment:  Labs drawn without difficulty: Yes.    Discharge Plan:  Departure Mode: Ambulatory.    Face to Face Time: 10.    Corrie Hurt CMA, CMA

## 2023-06-08 ENCOUNTER — TELEPHONE (OUTPATIENT)
Dept: PHARMACY | Facility: CLINIC | Age: 83
End: 2023-06-08
Payer: MEDICARE

## 2023-06-08 LAB
HAPTOGLOB SERPL-MCNC: <3 MG/DL (ref 32–197)
IGA SERPL-MCNC: <2 MG/DL (ref 84–499)
IGG SERPL-MCNC: 423 MG/DL (ref 610–1616)
IGM SERPL-MCNC: <10 MG/DL (ref 35–242)

## 2023-06-08 NOTE — TELEPHONE ENCOUNTER
Pharmacist IVIG Stewardship Program    Diagnosis: Common Variable Immunodeficiency  - Patient was originally diagnosed with Baires back in the 2000 s and later transferred her care to [facility]   Date 5/27/2015   IgA Level  Undetectable   IgG Total 658   IgG2 106   IgG4 0.5   IgM Level 48     Current Dosing Regimen: Privigen 35g IV monthly.    - Titrated down from every 3 week infusions to every 4 week infusions in 2017  - Titrated down further from every 4 weeks to every 6 weeks infusions in March 2018, but patient declined drastically and had to be escalated back to monthly infusions in September 2018  Pre-medications:  1. Methylprednisolone 20 mg IV push prior to infusion  Current Titration Regimen: 0.5ml/kg/hr and increased by 0.5ml/kg/hr every 15 minutes, max rate 4ml/kg/hr.  Previously Tried Products: Gammagard SD, currently on Privigen    Side Effects: Patient denies side effects such as headaches, flushing, fever, muscle or joint pain, nausea, or rash/itching.    Interventions: Removed acetaminophen and diphenhydramine from patients plan due to patient tolerating infusions well without use.     Assessment:   Date  6/7/23   IgA <2   IgM <10   IgG 423   - Patient's level is below standard range and she is appropriate for additional IVIG therapy. She is tolerating infusions well and IVIG infusions are effective in increasing Ig levels to an appropriate level to minimize patients risk of infection. Patient should continue on treatment as she has been per provider orders, without therapy her levels would drop below therapeutic range.    Plan: Patient is okay to proceed with infusions as planned per provider order through June, 2023    Next Review Needed: 7/2023    Lynsey Narvaez, PharmD, IgCP  Medication Access Pharmacist

## 2023-06-09 ENCOUNTER — INFUSION THERAPY VISIT (OUTPATIENT)
Dept: INFUSION THERAPY | Facility: CLINIC | Age: 83
End: 2023-06-09
Attending: INTERNAL MEDICINE
Payer: MEDICARE

## 2023-06-09 VITALS
HEART RATE: 79 BPM | BODY MASS INDEX: 19.68 KG/M2 | WEIGHT: 145.1 LBS | TEMPERATURE: 97.8 F | OXYGEN SATURATION: 97 % | SYSTOLIC BLOOD PRESSURE: 113 MMHG | DIASTOLIC BLOOD PRESSURE: 66 MMHG | RESPIRATION RATE: 16 BRPM

## 2023-06-09 DIAGNOSIS — D59.19 OTHER AUTOIMMUNE HEMOLYTIC ANEMIA (H): ICD-10-CM

## 2023-06-09 DIAGNOSIS — D83.9 CVID (COMMON VARIABLE IMMUNODEFICIENCY) (H): ICD-10-CM

## 2023-06-09 DIAGNOSIS — D59.10 AUTOIMMUNE HEMOLYTIC ANEMIA (H): ICD-10-CM

## 2023-06-09 DIAGNOSIS — D80.3 SELECTIVE DEFICIENCY OF IGG SUBCLASSES (H): Primary | ICD-10-CM

## 2023-06-09 PROCEDURE — 96366 THER/PROPH/DIAG IV INF ADDON: CPT

## 2023-06-09 PROCEDURE — 96375 TX/PRO/DX INJ NEW DRUG ADDON: CPT

## 2023-06-09 PROCEDURE — 96365 THER/PROPH/DIAG IV INF INIT: CPT

## 2023-06-09 PROCEDURE — 250N000011 HC RX IP 250 OP 636: Performed by: INTERNAL MEDICINE

## 2023-06-09 RX ORDER — EPINEPHRINE 1 MG/ML
0.3 INJECTION, SOLUTION INTRAMUSCULAR; SUBCUTANEOUS EVERY 5 MIN PRN
Status: CANCELLED | OUTPATIENT
Start: 2023-06-20

## 2023-06-09 RX ORDER — ALBUTEROL SULFATE 90 UG/1
1-2 AEROSOL, METERED RESPIRATORY (INHALATION)
Status: CANCELLED
Start: 2023-06-20

## 2023-06-09 RX ORDER — ALBUTEROL SULFATE 0.83 MG/ML
2.5 SOLUTION RESPIRATORY (INHALATION)
Status: CANCELLED | OUTPATIENT
Start: 2023-06-20

## 2023-06-09 RX ORDER — DIPHENHYDRAMINE HYDROCHLORIDE 50 MG/ML
50 INJECTION INTRAMUSCULAR; INTRAVENOUS
Status: CANCELLED
Start: 2023-06-20

## 2023-06-09 RX ORDER — METHYLPREDNISOLONE SODIUM SUCCINATE 40 MG/ML
20 INJECTION, POWDER, LYOPHILIZED, FOR SOLUTION INTRAMUSCULAR; INTRAVENOUS ONCE
Status: CANCELLED
Start: 2023-06-09 | End: 2023-06-09

## 2023-06-09 RX ORDER — METHYLPREDNISOLONE SODIUM SUCCINATE 40 MG/ML
40 INJECTION, POWDER, LYOPHILIZED, FOR SOLUTION INTRAMUSCULAR; INTRAVENOUS ONCE
Status: DISCONTINUED | OUTPATIENT
Start: 2023-06-09 | End: 2023-06-09

## 2023-06-09 RX ORDER — METHYLPREDNISOLONE SODIUM SUCCINATE 40 MG/ML
20 INJECTION, POWDER, LYOPHILIZED, FOR SOLUTION INTRAMUSCULAR; INTRAVENOUS ONCE
Status: CANCELLED
Start: 2023-06-20 | End: 2023-06-20

## 2023-06-09 RX ORDER — METHYLPREDNISOLONE SODIUM SUCCINATE 40 MG/ML
20 INJECTION, POWDER, LYOPHILIZED, FOR SOLUTION INTRAMUSCULAR; INTRAVENOUS ONCE
Status: COMPLETED | OUTPATIENT
Start: 2023-06-09 | End: 2023-06-09

## 2023-06-09 RX ORDER — MEPERIDINE HYDROCHLORIDE 25 MG/ML
25 INJECTION INTRAMUSCULAR; INTRAVENOUS; SUBCUTANEOUS EVERY 30 MIN PRN
Status: CANCELLED | OUTPATIENT
Start: 2023-06-20

## 2023-06-09 RX ORDER — METHYLPREDNISOLONE SODIUM SUCCINATE 40 MG/ML
20 INJECTION, POWDER, LYOPHILIZED, FOR SOLUTION INTRAMUSCULAR; INTRAVENOUS ONCE
Status: CANCELLED
Start: 2023-08-04 | End: 2023-06-20

## 2023-06-09 RX ORDER — HEPARIN SODIUM (PORCINE) LOCK FLUSH IV SOLN 100 UNIT/ML 100 UNIT/ML
5 SOLUTION INTRAVENOUS
Status: CANCELLED | OUTPATIENT
Start: 2023-06-20

## 2023-06-09 RX ORDER — HEPARIN SODIUM,PORCINE 10 UNIT/ML
5 VIAL (ML) INTRAVENOUS
Status: CANCELLED | OUTPATIENT
Start: 2023-06-20

## 2023-06-09 RX ORDER — NALOXONE HYDROCHLORIDE 0.4 MG/ML
0.2 INJECTION, SOLUTION INTRAMUSCULAR; INTRAVENOUS; SUBCUTANEOUS
Status: CANCELLED | OUTPATIENT
Start: 2023-06-20

## 2023-06-09 RX ORDER — CEFDINIR 300 MG/1
300 CAPSULE ORAL 2 TIMES DAILY
COMMUNITY
End: 2023-10-17

## 2023-06-09 RX ORDER — METHYLPREDNISOLONE SODIUM SUCCINATE 125 MG/2ML
125 INJECTION, POWDER, LYOPHILIZED, FOR SOLUTION INTRAMUSCULAR; INTRAVENOUS
Status: CANCELLED
Start: 2023-06-20

## 2023-06-09 RX ADMIN — HUMAN IMMUNOGLOBULIN G 35 G: 20 LIQUID INTRAVENOUS at 13:15

## 2023-06-09 RX ADMIN — METHYLPREDNISOLONE SODIUM SUCCINATE 20 MG: 40 INJECTION, POWDER, FOR SOLUTION INTRAMUSCULAR; INTRAVENOUS at 13:09

## 2023-06-09 NOTE — PROGRESS NOTES
Infusion Nursing Note:  Tricia Sosa presents today for IVIG.    Patient seen by provider today: No   present during visit today: Not Applicable.    Note:  Patient reports is feeling fine today.  Patient denies fevers or chills.  Please see note below regarding chronic sinusitis.      Pre-Medication given today:  Solu-Medrol 20mg IVP.  Patient does not take Tylenol or Benadryl with IVIG.    IVIG started at 0.5ml/kg/hr x 15 minutes, then increased by 0.5ml/kg/hr Q 15 minutes until maximum rate of 4ml/kg/hr.      Intravenous Access:  Peripheral IV placed.  PIV site C/D/I.  PIV flushes well + excellent blood return.    Treatment Conditions:  Biological Infusion Checklist:  ~~~ NOTE: If the patient answers yes to any of the questions below, hold the infusion and contact ordering provider or on-call provider.    1. Have you recently had an elevated temperature, fever, chills, productive cough, coughing for 3 weeks or longer or hemoptysis,  abnormal vital signs, night sweats,  chest pain or have you noticed a decrease in your appetite, unexplained weight loss or fatigue? No  2. Do you have any open wounds or new incisions? No  3. Do you have any upcoming hospitalizations or surgeries? Does not include esophagogastroduodenoscopy, colonoscopy, endoscopic retrograde cholangiopancreatography (ERCP), endoscopic ultrasound (EUS), dental procedures or joint aspiration/steroid injections No  4. Do you currently have any signs of illness or infection or are you on any antibiotics? Yes.  Patient will start Prednisone taper x 16 days + Omnicef 300mg PO BID x 21 days for chronic sinusitis per Dr. Contreras (ENT).  Writer reviewed with Dr. Ryder.  SARAH Ryder/Fadumo Caldera,RN:  Okay to proceed with IVIG today.  5. Have you had any new, sudden or worsening abdominal pain? No  6. Have you or anyone in your household received a live vaccination in the past 4 weeks? Please note: No live vaccines while on biologic/chemotherapy until 6  months after the last treatment. Patient can receive the flu vaccine (shot only), pneumovax and the Covid vaccine. It is optimal for the patient to get these vaccines mid cycle, but they can be given at any time as long as it is not on the day of the infusion. No  7. Have you recently been diagnosed with any new nervous system diseases (ie. Multiple sclerosis, Guillain [place], seizures, neurological changes) or cancer diagnosis? Are you on any form of radiation or chemotherapy? No  8. Are you pregnant or breast feeding or do you have plans of pregnancy in the future? N/A  9. Have you been having any signs of worsening depression or suicidal ideations?  (benlysta only) N/A  10. Have there been any other new onset medical symptoms?  Yes.  Please see above note regarding treatment for chronic sinusitis.  11. Have you had any new blood clots? (IVIG only) No    Post Infusion Assessment:  Patient tolerated infusion without incident.  Blood return noted pre and post infusion.  Site patent and intact, free from redness, edema or discomfort.  No evidence of extravasations.  Access discontinued per protocol.       Discharge Plan:   Discharge instructions reviewed with: Patient.  Patient and/or family verbalized understanding of discharge instructions and all questions answered.  Patient discharged in stable condition accompanied by: self.  Departure Mode: Ambulatory.  6/27/23: Labs, Appointment with Dr. Ryder + Eric.    Fadumo Caldera, RN, BSN, OCN on 6/9/2023 at 3:40 PM

## 2023-06-12 DIAGNOSIS — L30.9 DERMATITIS: ICD-10-CM

## 2023-06-13 ENCOUNTER — THERAPY VISIT (OUTPATIENT)
Dept: PHYSICAL THERAPY | Facility: CLINIC | Age: 83
End: 2023-06-13
Attending: STUDENT IN AN ORGANIZED HEALTH CARE EDUCATION/TRAINING PROGRAM
Payer: MEDICARE

## 2023-06-13 DIAGNOSIS — M62.81 GENERALIZED MUSCLE WEAKNESS: ICD-10-CM

## 2023-06-13 DIAGNOSIS — R53.83 OTHER FATIGUE: ICD-10-CM

## 2023-06-13 DIAGNOSIS — D59.10 AUTOIMMUNE HEMOLYTIC ANEMIA (H): ICD-10-CM

## 2023-06-13 DIAGNOSIS — E03.9 ACQUIRED HYPOTHYROIDISM: ICD-10-CM

## 2023-06-13 PROCEDURE — 97110 THERAPEUTIC EXERCISES: CPT | Mod: GP | Performed by: PHYSICAL THERAPIST

## 2023-06-13 PROCEDURE — 97162 PT EVAL MOD COMPLEX 30 MIN: CPT | Mod: GP | Performed by: PHYSICAL THERAPIST

## 2023-06-13 RX ORDER — LEVOTHYROXINE SODIUM 150 UG/1
TABLET ORAL
Qty: 90 TABLET | Refills: 2 | Status: SHIPPED | OUTPATIENT
Start: 2023-06-13 | End: 2024-05-13

## 2023-06-13 NOTE — TELEPHONE ENCOUNTER
Patient returned call and said she roche not know how this was reported that she stopped the medication. she said she has taken it continuously since then.

## 2023-06-13 NOTE — PROGRESS NOTES
PHYSICAL THERAPY EVALUATION  Type of Visit: Evaluation    See electronic medical record for Abuse and Falls Screening details.    Subjective      Presenting condition or subjective complaint: general weakness and balance issues and not improving with current exercises.  Date of onset: 05/16/23 (date of order)    Relevant medical history: Anemia; Hearing problems; Significant weakness; Sleep disorder like apnea; Thyroid problems; Vision problems   History of autoimmune hemolytic anemia (initially diagnosed 2004) with longstanding intermittent use of prednisone. History of subdural hematoma 2014 after a fall.   Dates & types of surgery: I think you have all my health history with surgeries    Prior diagnostic imaging/testing results: MRI; CT scan; X-ray; EMG     Prior therapy history for the same diagnosis, illness or injury: Yes Records of last therapy at the [address] [place] [address] location.     She states that she goes for walks with her dog for about 10 min.  She has a 4WW but doesn't want to use it as feels that it will lead to further weakness.     Prior Level of Function   Transfers: Assistive equipment  Ambulation: Assistive equipment  ADL: Independent  IADL: doesn't drive    Living Environment  Social support: With family members   Type of home: House   Stairs to enter the home: Yes 6 Is there a railing: Yes   Ramp: No   Stairs inside the home: Yes 14 Is there a railing: Yes   Help at home: Home management tasks (cooking, cleaning); Home and Yard maintenance tasks; Assist for driving and community activities  Equipment owned: Straight Cane; Four-point cane; Walker; Walker with wheels; Grab bars; Raised toilet seat; Bath bench     Employment: No    Hobbies/Interests: gardening, sewing, crafts,    Patient goals for therapy: Walk and stand properly without leaning over or strain on my lower back.  Be able to lift more easily.    Pain assessment: Pain denied     Objective    Cognitive Status Examination  Orientation:  Oriented to person, place and time   Level of Consciousness: Alert  Follows Commands and Answers Questions: 100% of the time, Follows multi step instructions  Personal Safety and Judgement: Intact  Memory: not formally assessed    OBSERVATION:   intEGUMENTARY: no issues noted  POSTURE: holds COM posterior to JOVITA/Sitting in closed pack position lumbo pelvic femoral. L pelvis higher in standing and trunk shifts to the R in thr frontal plane with elevated R shoulder.     RANGE OF MOTION: LE ROM WFL    STRENGTH: Seated hip flexion, knee extension and flexion , ankle dorsiflexion 5/5,  sidelying hip abduction 3/5 R and 3+/5 L .  Functionally decreased hip stabilizer strength, decreased tolerance to activity with fatigue.  Postural muscles low endurance.     BED MOBILITY: WNL    TRANSFERS: needing UE for assist sit to stand from 18 inch chair, sitting with uncontrolled descent.     WHEELCHAIR MOBILITY: not applicable    GAIT:   Level of Fannin: arrives to clinic with cane, SBA without a device.   Assistive Device(s): Cane (single end)  Gait Deviations: Stride length decreased  Kasey decreased decreased mid to end stance B. Occasional weave in straight path of gait.  Gait Distance: in clinic  Stairs: B railing and alternating stepping    BALANCE: Standing Balance (dynamic):Fair    SPECIAL TESTS  Functional Gait Assessment (FGA) TOTAL SCORE: (MAXIMUM SCORE 30): 14    10 Meter Walk Test (Comfortable)     10 Meter Walk Test (Fast)     6 Minute Walk Test (6MWT)        did not assess this date   Griffin Balance Scale (BBS)     5 Times Sit-to-Stand (5TSTS)       Dynamic Gait Index (DGI)     Timed Up and Go (TUG) - sec    Single Leg Stance Right (sec)    Single Leg Stance Left (sec)    Modified CTSIB Conditions (sec) Cond 1: 30  Cond 2: 5  Cond 4: 30 SBA   Cond 5 : 1   Romberg  (sec)    Sharpened Romberg (sec)    30 Second Sit to Stand (reps/height) Not able to complete from 18 inch chair without UE support. Uncontrolled  "descent last 1/4 stand to sit onto 18 inch chair           SENSATION: reports decreased sensation B feet, numbness \"sometimes when lift feet feels like dead weight\"       MUSCLE TONE: no abnormal tone noted        Assessment & Plan   CLINICAL IMPRESSIONS   Medical Diagnosis: generalized muscle weakness, other fatigue, autoimmune hemolytic anemia    Treatment Diagnosis: decreased force production muscles and decreased endurance , gait and balance impairments   Impression/Assessment: Patient is a 82 year old female with  Complaints of decreased endurance and balance. Wondering if her HEP is correct or if she needs new exercises to address her impairments.  She was seen in the past for PT and feels she has not increased in her strength.  The following significant findings have been identified: Decreased strength, Impaired balance, Impaired sensation, Impaired gait, Impaired muscle performance, Decreased activity tolerance, Impaired posture and Instability. These impairments interfere with their ability to perform self care tasks, recreational activities, household mobility and community mobility as compared to previous level of function.     Clinical Decision Making (Complexity):   Clinical Presentation: Evolving/Changing  Clinical Presentation Rationale: based on medical and personal factors listed in PT evaluation  Clinical Decision Making (Complexity): Moderate complexity    PLAN OF CARE  Treatment Interventions:  Interventions: Gait Training, Manual Therapy, Neuromuscular Re-education, Therapeutic Activity, Therapeutic Exercise, Self-Care/Home Management    Long Term Goals     PT Goal 1  Goal Identifier: HEP  Goal Description: Tricia to be independent in an appropriate HEP to address her impairments safely and improve her overall function and well being.  Rationale: to maximize safety and independence with performance of ADLs and functional tasks;to maximize safety and independence within the home;to maximize safety " and independence within the community;to maximize safety and independence with transportation;to maximize safety and independence with self cares  Target Date: 09/11/23  PT Goal 2  Goal Identifier: balance  Goal Description: Tricia to  rhomberg with EC on regular surface x 20 sec and on conforming surface/foam x 12 sec or greater to demonstate improved balance for all activities.  Rationale: to maximize safety and independence with performance of ADLs and functional tasks;to maximize safety and independence within the home;to maximize safety and independence within the community;to maximize safety and independence with transportation;to maximize safety and independence with self cares  Goal Progress: eval 6/13/23 on regular kaylee 5 sec, on foam 1 sec  Target Date: 09/11/23  PT Goal 3  Goal Identifier: FGA  Goal Description: Tricia to increase her FGA score from 14/30 at eval to 22 or greater to demonstrate improved functional balance and strength for decreased fall risk and improved function in home and community.  Rationale: to maximize safety and independence with performance of ADLs and functional tasks;to maximize safety and independence within the home;to maximize safety and independence within the community;to maximize safety and independence with transportation;to maximize safety and independence with self cares  Target Date: 09/11/23  PT Goal 4  Goal Identifier: functional endurance  Goal Description: Tricia to improve her 6MWT distance by 75 meters or greater to demonstrate improved functional endurance to allow for better tolerance of home activities, community mobility.  Rationale: to maximize safety and independence with performance of ADLs and functional tasks;to maximize safety and independence within the home;to maximize safety and independence within the community;to maximize safety and independence with transportation;to maximize safety and independence with self cares  Goal Progress:  9/11/23  PT Goal 5  Goal Identifier: hip abduction in sidelying  Goal Description: Tricia to increase her hip abduction to 4-5/5 B to allow for improved balance, functional strength, gait pattern and overall safety.  Rationale: to maximize safety and independence with performance of ADLs and functional tasks;to maximize safety and independence within the home;to maximize safety and independence within the community;to maximize safety and independence with transportation;to maximize safety and independence with self cares  Target Date: 09/11/23  PT Goal 6  Goal Identifier: LE functional strength  Goal Description: Tricia to perform sit to stand to 18 inch chair with controlled descent for greater safety with transfers, decreased fall risk and improved overall mobility  Rationale: to maximize safety and independence with performance of ADLs and functional tasks;to maximize safety and independence within the home;to maximize safety and independence within the community;to maximize safety and independence with transportation;to maximize safety and independence with self cares  Goal Progress: eval - uncontrolled descent in to 18 inch chair.  Target Date: 09/11/23      Frequency of Treatment: 8 sessions  Duration of Treatment: in 90 days    Recommended Referrals to Other Professionals: none at this time  Education Assessment:   Learner/Method: Patient;Listening;Demonstration  Education Comments: results of eval, POC and goals.    Risks and benefits of evaluation/treatment have been explained.   Patient/Family/caregiver agrees with Plan of Care.     Evaluation Time:     PT Eval, Moderate Complexity Minutes (08367): 32      Signing Clinician: Katya Betnacourt PT      [facility]                                                                                   OUTPATIENT PHYSICAL THERAPY      PLAN OF TREATMENT FOR OUTPATIENT REHABILITATION   Patient's Last Name, First Name, Tricia Jc  YOB: 1940   Provider's Name   [facility]   Medical Record No.  [id number]     Onset Date: 05/16/23 (date of order)  Start of Care Date: 06/13/23     Medical Diagnosis:  generalized muscle weakness, other fatigue, autoimmune hemolytic anemia      PT Treatment Diagnosis:  decreased force production muscles and decreased endurance , gait and balance impairments Plan of Treatment  Frequency/Duration: 8 sessions/ in 90 days    Certification date from 06/14/23 to 09/12/23         See note for plan of treatment details and functional goals     Katya Betancourt, PT                         I CERTIFY THE NEED FOR THESE SERVICES FURNISHED UNDER        THIS PLAN OF TREATMENT AND WHILE UNDER MY CARE     (Physician attestation of this document indicates review and certification of the therapy plan).                  Referring Provider:  Marley Heart      Initial Assessment  See Epic Evaluation- Start of Care Date: 06/13/23

## 2023-06-13 NOTE — TELEPHONE ENCOUNTER
Called and left message for patient to call clinic back regarding medication clarification.    Need to know if patient is currently requesting/taking this medication. Medication was discontinued by rooming staff on 02/01/2023 due to: Patient stopped medication.    Luis Hoff, Triage RN Lobo Chazy  1:55 PM 6/13/2023

## 2023-06-14 NOTE — TELEPHONE ENCOUNTER
Pending Prescriptions:                       Disp   Refills    levothyroxine (SYNTHROID/LEVOTHROID) 150 *90 tab*2            Sig: Take one tablet  by mouth daily    Prescription approved per [facility] Refill Protocol.

## 2023-06-15 RX ORDER — KETOCONAZOLE 20 MG/ML
SHAMPOO TOPICAL
Qty: 120 ML | Refills: 0 | Status: SHIPPED | OUTPATIENT
Start: 2023-06-15 | End: 2024-01-12

## 2023-06-15 NOTE — PROGRESS NOTES
06/13/23 0500   Signing Clinician's Name / Credentials   Signing clinician's name / credentials Katya Betancourt PT   Functional Gait Assessment (ALSESANDRO Fine, LG Josue, et al. (2004))   1. GAIT LEVEL SURFACE 1  (9.13 sec)   2. CHANGE IN GAIT SPEED 2   3. GAIT WITH HORIZONTAL HEAD TURNS 2   4. GAIT WITH VERTICAL HEAD TURNS 2   5. GAIT AND PIVOT TURN 2   6. STEP OVER OBSTACLE 2   7. GAIT WITH NARROW BASE OF SUPPORT 0   8. GAIT WITH EYES CLOSED 0   9. AMBULATING BACKWARDS 1  (37.44 sec)   10. STEPS 2   Total Functional Gait Assessment Score   TOTAL SCORE: (MAXIMUM SCORE 30) 14     Functional Gait Assessment (FGA): The FGA assesses postural stability during various walking tasks.   Gait assistive device used: None    Scores of <22 /30 have been correlated with predicting falls in community-dwelling older adults according to Babatunde & Christiano 2010.   Scores of <18 /30 have been correlated with increased risk for falls in patients with Parkinsons Disease according to Hu Abraham Zhou et al 2014.  Minimal Detectable Change for patients with acute/chronic stroke = 4.2 according to Thineeru & Ritschel 2009  Minimal Detectable Change for patients with vestibular disorder = 8 according to Babatunde & Christiano 2010    Assessment (rationale for performing, application to patient s function & care plan): baseline, fall risk  (Minutes billed as physical performance test):  none- part of eval

## 2023-06-26 DIAGNOSIS — D59.19 OTHER AUTOIMMUNE HEMOLYTIC ANEMIA (H): Primary | ICD-10-CM

## 2023-06-26 DIAGNOSIS — D83.9 CVID (COMMON VARIABLE IMMUNODEFICIENCY) (H): ICD-10-CM

## 2023-06-26 RX ORDER — DIPHENHYDRAMINE HCL 25 MG
50 CAPSULE ORAL ONCE
Status: CANCELLED
Start: 2023-06-27

## 2023-06-26 RX ORDER — ALBUTEROL SULFATE 90 UG/1
1-2 AEROSOL, METERED RESPIRATORY (INHALATION)
Status: CANCELLED
Start: 2023-08-22

## 2023-06-26 RX ORDER — MEPERIDINE HYDROCHLORIDE 25 MG/ML
25 INJECTION INTRAMUSCULAR; INTRAVENOUS; SUBCUTANEOUS
Status: CANCELLED
Start: 2023-06-27

## 2023-06-26 RX ORDER — NALOXONE HYDROCHLORIDE 0.4 MG/ML
0.2 INJECTION, SOLUTION INTRAMUSCULAR; INTRAVENOUS; SUBCUTANEOUS
Status: CANCELLED | OUTPATIENT
Start: 2023-08-22

## 2023-06-26 RX ORDER — DIPHENHYDRAMINE HYDROCHLORIDE 50 MG/ML
50 INJECTION INTRAMUSCULAR; INTRAVENOUS
Status: CANCELLED
Start: 2023-06-27

## 2023-06-26 RX ORDER — MEPERIDINE HYDROCHLORIDE 25 MG/ML
25 INJECTION INTRAMUSCULAR; INTRAVENOUS; SUBCUTANEOUS EVERY 30 MIN PRN
Status: CANCELLED | OUTPATIENT
Start: 2023-08-22

## 2023-06-26 RX ORDER — MEPERIDINE HYDROCHLORIDE 25 MG/ML
25 INJECTION INTRAMUSCULAR; INTRAVENOUS; SUBCUTANEOUS EVERY 30 MIN PRN
Status: CANCELLED | OUTPATIENT
Start: 2023-06-27

## 2023-06-26 RX ORDER — HEPARIN SODIUM (PORCINE) LOCK FLUSH IV SOLN 100 UNIT/ML 100 UNIT/ML
5 SOLUTION INTRAVENOUS
Status: CANCELLED | OUTPATIENT
Start: 2023-08-22

## 2023-06-26 RX ORDER — ACETAMINOPHEN 325 MG/1
650 TABLET ORAL ONCE
Status: CANCELLED
Start: 2023-08-22

## 2023-06-26 RX ORDER — DIPHENHYDRAMINE HCL 25 MG
50 CAPSULE ORAL ONCE
Status: CANCELLED
Start: 2023-08-22

## 2023-06-26 RX ORDER — ALBUTEROL SULFATE 0.83 MG/ML
2.5 SOLUTION RESPIRATORY (INHALATION)
Status: CANCELLED | OUTPATIENT
Start: 2023-06-27

## 2023-06-26 RX ORDER — HEPARIN SODIUM,PORCINE 10 UNIT/ML
5 VIAL (ML) INTRAVENOUS
Status: CANCELLED | OUTPATIENT
Start: 2023-08-22

## 2023-06-26 RX ORDER — METHYLPREDNISOLONE SODIUM SUCCINATE 125 MG/2ML
125 INJECTION, POWDER, LYOPHILIZED, FOR SOLUTION INTRAMUSCULAR; INTRAVENOUS
Status: CANCELLED
Start: 2023-06-27

## 2023-06-26 RX ORDER — EPINEPHRINE 1 MG/ML
0.3 INJECTION, SOLUTION INTRAMUSCULAR; SUBCUTANEOUS EVERY 5 MIN PRN
Status: CANCELLED | OUTPATIENT
Start: 2023-06-27

## 2023-06-26 RX ORDER — MEPERIDINE HYDROCHLORIDE 25 MG/ML
25 INJECTION INTRAMUSCULAR; INTRAVENOUS; SUBCUTANEOUS
Status: CANCELLED
Start: 2023-08-22

## 2023-06-26 RX ORDER — ACETAMINOPHEN 325 MG/1
650 TABLET ORAL ONCE
Status: CANCELLED
Start: 2023-06-27

## 2023-06-26 RX ORDER — METHYLPREDNISOLONE SODIUM SUCCINATE 125 MG/2ML
125 INJECTION, POWDER, LYOPHILIZED, FOR SOLUTION INTRAMUSCULAR; INTRAVENOUS
Status: CANCELLED
Start: 2023-08-22

## 2023-06-26 RX ORDER — DIPHENHYDRAMINE HYDROCHLORIDE 50 MG/ML
50 INJECTION INTRAMUSCULAR; INTRAVENOUS
Status: CANCELLED
Start: 2023-08-22

## 2023-06-26 RX ORDER — ALBUTEROL SULFATE 90 UG/1
1-2 AEROSOL, METERED RESPIRATORY (INHALATION)
Status: CANCELLED
Start: 2023-06-27

## 2023-06-26 RX ORDER — HEPARIN SODIUM (PORCINE) LOCK FLUSH IV SOLN 100 UNIT/ML 100 UNIT/ML
5 SOLUTION INTRAVENOUS
Status: CANCELLED | OUTPATIENT
Start: 2023-06-27

## 2023-06-26 RX ORDER — ALBUTEROL SULFATE 0.83 MG/ML
2.5 SOLUTION RESPIRATORY (INHALATION)
Status: CANCELLED | OUTPATIENT
Start: 2023-08-22

## 2023-06-26 RX ORDER — NALOXONE HYDROCHLORIDE 0.4 MG/ML
0.2 INJECTION, SOLUTION INTRAMUSCULAR; INTRAVENOUS; SUBCUTANEOUS
Status: CANCELLED | OUTPATIENT
Start: 2023-06-27

## 2023-06-26 RX ORDER — EPINEPHRINE 1 MG/ML
0.3 INJECTION, SOLUTION INTRAMUSCULAR; SUBCUTANEOUS EVERY 5 MIN PRN
Status: CANCELLED | OUTPATIENT
Start: 2023-08-22

## 2023-06-26 RX ORDER — HEPARIN SODIUM,PORCINE 10 UNIT/ML
5 VIAL (ML) INTRAVENOUS
Status: CANCELLED | OUTPATIENT
Start: 2023-06-27

## 2023-06-27 ENCOUNTER — LAB (OUTPATIENT)
Dept: INFUSION THERAPY | Facility: CLINIC | Age: 83
End: 2023-06-27
Attending: INTERNAL MEDICINE
Payer: MEDICARE

## 2023-06-27 ENCOUNTER — ONCOLOGY VISIT (OUTPATIENT)
Dept: ONCOLOGY | Facility: CLINIC | Age: 83
End: 2023-06-27
Attending: INTERNAL MEDICINE
Payer: MEDICARE

## 2023-06-27 VITALS
BODY MASS INDEX: 19.37 KG/M2 | HEART RATE: 70 BPM | TEMPERATURE: 98.4 F | SYSTOLIC BLOOD PRESSURE: 139 MMHG | RESPIRATION RATE: 16 BRPM | WEIGHT: 143 LBS | OXYGEN SATURATION: 97 % | HEIGHT: 72 IN | DIASTOLIC BLOOD PRESSURE: 75 MMHG

## 2023-06-27 DIAGNOSIS — D59.10 AUTOIMMUNE HEMOLYTIC ANEMIA (H): ICD-10-CM

## 2023-06-27 DIAGNOSIS — D83.9 CVID (COMMON VARIABLE IMMUNODEFICIENCY) (H): Primary | ICD-10-CM

## 2023-06-27 DIAGNOSIS — D59.19 OTHER AUTOIMMUNE HEMOLYTIC ANEMIA (H): Primary | ICD-10-CM

## 2023-06-27 DIAGNOSIS — D59.19 OTHER AUTOIMMUNE HEMOLYTIC ANEMIA (H): ICD-10-CM

## 2023-06-27 DIAGNOSIS — D80.3 SELECTIVE DEFICIENCY OF IGG SUBCLASSES (H): ICD-10-CM

## 2023-06-27 LAB
ALBUMIN SERPL BCG-MCNC: 4.4 G/DL (ref 3.5–5.2)
ALP SERPL-CCNC: 111 U/L (ref 35–104)
ALT SERPL W P-5'-P-CCNC: 19 U/L (ref 0–50)
ANION GAP SERPL CALCULATED.3IONS-SCNC: 9 MMOL/L (ref 7–15)
AST SERPL W P-5'-P-CCNC: 29 U/L (ref 0–45)
BASOPHILS # BLD AUTO: 0 10E3/UL (ref 0–0.2)
BASOPHILS NFR BLD AUTO: 0 %
BILIRUB SERPL-MCNC: 0.6 MG/DL
BUN SERPL-MCNC: 17.9 MG/DL (ref 8–23)
CALCIUM SERPL-MCNC: 8.8 MG/DL (ref 8.8–10.2)
CHLORIDE SERPL-SCNC: 101 MMOL/L (ref 98–107)
CREAT SERPL-MCNC: 0.61 MG/DL (ref 0.51–0.95)
DEPRECATED HCO3 PLAS-SCNC: 25 MMOL/L (ref 22–29)
EOSINOPHIL # BLD AUTO: 0 10E3/UL (ref 0–0.7)
EOSINOPHIL NFR BLD AUTO: 0 %
ERYTHROCYTE [DISTWIDTH] IN BLOOD BY AUTOMATED COUNT: 14.1 % (ref 10–15)
GFR SERPL CREATININE-BSD FRML MDRD: 89 ML/MIN/1.73M2
GLUCOSE SERPL-MCNC: 91 MG/DL (ref 70–99)
HCT VFR BLD AUTO: 37.1 % (ref 35–47)
HGB BLD-MCNC: 12.8 G/DL (ref 11.7–15.7)
IMM GRANULOCYTES # BLD: 0 10E3/UL
IMM GRANULOCYTES NFR BLD: 0 %
LDH SERPL L TO P-CCNC: 200 U/L (ref 0–250)
LYMPHOCYTES # BLD AUTO: 2.6 10E3/UL (ref 0.8–5.3)
LYMPHOCYTES NFR BLD AUTO: 41 %
MCH RBC QN AUTO: 34.6 PG (ref 26.5–33)
MCHC RBC AUTO-ENTMCNC: 34.5 G/DL (ref 31.5–36.5)
MCV RBC AUTO: 100 FL (ref 78–100)
MONOCYTES # BLD AUTO: 0.7 10E3/UL (ref 0–1.3)
MONOCYTES NFR BLD AUTO: 11 %
NEUTROPHILS # BLD AUTO: 2.9 10E3/UL (ref 1.6–8.3)
NEUTROPHILS NFR BLD AUTO: 48 %
NRBC # BLD AUTO: 0 10E3/UL
NRBC BLD AUTO-RTO: 0 /100
PLATELET # BLD AUTO: 115 10E3/UL (ref 150–450)
POTASSIUM SERPL-SCNC: 4.2 MMOL/L (ref 3.4–5.3)
PROT SERPL-MCNC: 6.1 G/DL (ref 6.4–8.3)
RBC # BLD AUTO: 3.7 10E6/UL (ref 3.8–5.2)
RETICS # AUTO: 0.06 10E6/UL (ref 0.03–0.1)
RETICS/RBC NFR AUTO: 1.5 % (ref 0.5–2)
SODIUM SERPL-SCNC: 135 MMOL/L (ref 136–145)
WBC # BLD AUTO: 6.2 10E3/UL (ref 4–11)

## 2023-06-27 PROCEDURE — 36415 COLL VENOUS BLD VENIPUNCTURE: CPT

## 2023-06-27 PROCEDURE — 85045 AUTOMATED RETICULOCYTE COUNT: CPT | Performed by: INTERNAL MEDICINE

## 2023-06-27 PROCEDURE — 96415 CHEMO IV INFUSION ADDL HR: CPT

## 2023-06-27 PROCEDURE — 85025 COMPLETE CBC W/AUTO DIFF WBC: CPT | Performed by: INTERNAL MEDICINE

## 2023-06-27 PROCEDURE — 96413 CHEMO IV INFUSION 1 HR: CPT

## 2023-06-27 PROCEDURE — 250N000011 HC RX IP 250 OP 636: Mod: JZ | Performed by: INTERNAL MEDICINE

## 2023-06-27 PROCEDURE — 83010 ASSAY OF HAPTOGLOBIN QUANT: CPT | Performed by: INTERNAL MEDICINE

## 2023-06-27 PROCEDURE — 82784 ASSAY IGA/IGD/IGG/IGM EACH: CPT | Performed by: INTERNAL MEDICINE

## 2023-06-27 PROCEDURE — 99214 OFFICE O/P EST MOD 30 MIN: CPT | Performed by: INTERNAL MEDICINE

## 2023-06-27 PROCEDURE — 250N000013 HC RX MED GY IP 250 OP 250 PS 637: Performed by: INTERNAL MEDICINE

## 2023-06-27 PROCEDURE — G0463 HOSPITAL OUTPT CLINIC VISIT: HCPCS | Mod: 25 | Performed by: INTERNAL MEDICINE

## 2023-06-27 PROCEDURE — 80053 COMPREHEN METABOLIC PANEL: CPT | Performed by: INTERNAL MEDICINE

## 2023-06-27 PROCEDURE — 83615 LACTATE (LD) (LDH) ENZYME: CPT | Performed by: INTERNAL MEDICINE

## 2023-06-27 PROCEDURE — 258N000003 HC RX IP 258 OP 636: Performed by: INTERNAL MEDICINE

## 2023-06-27 RX ORDER — DIPHENHYDRAMINE HCL 25 MG
50 CAPSULE ORAL ONCE
Status: COMPLETED | OUTPATIENT
Start: 2023-06-27 | End: 2023-06-27

## 2023-06-27 RX ORDER — ACETAMINOPHEN 325 MG/1
650 TABLET ORAL ONCE
Status: COMPLETED | OUTPATIENT
Start: 2023-06-27 | End: 2023-06-27

## 2023-06-27 RX ADMIN — RITUXIMAB-ABBS 700 MG: 10 INJECTION, SOLUTION INTRAVENOUS at 14:22

## 2023-06-27 RX ADMIN — SODIUM CHLORIDE 250 ML: 9 INJECTION, SOLUTION INTRAVENOUS at 14:22

## 2023-06-27 RX ADMIN — DIPHENHYDRAMINE HYDROCHLORIDE 25 MG: 25 CAPSULE ORAL at 14:10

## 2023-06-27 RX ADMIN — ACETAMINOPHEN 325 MG: 325 TABLET ORAL at 14:10

## 2023-06-27 ASSESSMENT — PAIN SCALES - GENERAL: PAINLEVEL: NO PAIN (0)

## 2023-06-27 NOTE — PROGRESS NOTES
Infusion Nursing Note:  Tricia Sosa presents today for Truxima Cycle 7 Day 1 .    Patient seen by provider today: Yes: Dr. Ryder   present during visit today: Not Applicable.    Note: Pt only took 25 of benadryl and 325 of tylenol per patient preference.  When patient is scheduled in the future for this is should be a two hour infusion.      Intravenous Access:  Peripheral IV placed.    Treatment Conditions:  Not Applicable.      Post Infusion Assessment:  Patient tolerated infusion without incident.  Blood return noted pre and post infusion.  Site patent and intact, free from redness, edema or discomfort.  No evidence of extravasations.  Access discontinued per protocol.       Discharge Plan:   Discharge instructions reviewed with: Patient.  Patient and/or family verbalized understanding of discharge instructions and all questions answered.  AVS to patient via AmindT.  Patient will return (scheduling helping to make next appt) for next appointment.   Patient discharged in stable condition accompanied by: self.  Departure Mode: Ambulatory.      Kari Schoen, RN

## 2023-06-27 NOTE — PROGRESS NOTES
[facility]    Hematology/Oncology Established Patient Follow-up Note    Treatment Summary:      Today's Date: 6/27/23    Reason for Follow-up: Hemolytic anemia-autoimmune; IgA deficiency     HISTORY OF PRESENT ILLNESS: Tricia Sosa is an 82 year old female who presents with autoimmune hemolytic anemia and IgA deficiency.  She previously saw Dr. Matos and then Dr. Summers.  She was previously seen at [facility].     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  She was very sick at the time and had severe diarrhea.  She lost about 40 pounds in weight.  The workup revealed that she had markedly diminished CD4 counts of 48 and was diagnosed with CMV colitis.  Since then she has been followed in hematology clinic at [place] until recently.  She was noted to have anemia which was fairly long-standing.  She was initially referred for anemia in 2004 and also had a bone marrow biopsy done at that time which revealed hypercellular bone marrow with 70-80% cellularity and normal trilineage hematopoiesis and no morphologic features of MDS or lymphoproliferation.  Her anemia was attributed to her chronic underlying disease.  At the next evaluation in 2002 on 4 aggressive anemia there was no evidence of hemolysis, iron deficiency, B12 or folate deficiency.  Bone marrow biopsy was not pursued.  In summer of 2015 she had progressive fatigue, dyspnea on exertion and worsening anemia with a hemoglobin now of 9.  Workup at this time did suggest a hemolytic anemia with elevated LDH at 709, undetectable haptoglobin and elevated unconjugated bilirubin at 3.3.  Monospecific MARIKA was positive for both IgG and complement.  Cold agglutinin screen was positive with very low titer 1:64.  She was started on prednisone 60 mg daily with supplementation of iron folate and B12 starting 7/31/15.  Her prednisone has been slowly tapered and was discontinued off in January 2016.  She was last  followed at [facility] on March 10, 2016 when she had stable hemoglobin.     She was followed by Dr. Matos and Dr. Summers.  Due to relapse of hemolytic anemia, she underwent another course of prednisone that has since been tapered off and rituximab x 4 weekly doses completed in 9/19/19.     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  She started weekly Rituxan on 7/31/20 x 4 doses, completed on 8/21/20.  She tapered off of prednisone in October 2020.     Due to relapse of her AIHA, she started prednisone again on 6/8/21 at 60 mg daily with slow taper and finished taper in August 2021.     Due to relapse of AIHA again, she started prednisone again on 11/2/21 at 70 mg daily with slow taper.  She also completed weekly Rituxan again x 4 doses 12/6/21-12/29/21.    Venofer 5/5-5/19/22.      INTERIM HISTORY:  Patient continues to do well.  She continues rituximab on maintenance every 2 months. She continues on IVIG as well.    No weight loss, fatigue, LAD, night sweats, fever. No gross evidence of bleed. She has a cyst of the left neck that is planned for removal.        REVIEW OF SYSTEMS:   A 14 point ROS was reviewed with pertinent positives and negatives in the HPI.       HOME MEDICATIONS:  Current Outpatient Medications   Medication Sig Dispense Refill     cefdinir (OMNICEF) 300 MG capsule Take 300 mg by mouth 2 times daily       cyanocobalamin (VITAMIN  B-12) 1000 MCG tablet   3     folic acid (FOLVITE) 1 MG tablet   3     ketoconazole (NIZORAL) 2 % external shampoo Use every other day to scalp as needed 120 mL 0     levothyroxine (SYNTHROID/LEVOTHROID) 150 MCG tablet Take one tablet  by mouth daily 90 tablet 2     UNKNOWN TO PATIENT Prednisone taper for total of 16 days per Dr. 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    Hemorrhage Subdural Trauma Without LOC Active     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, and posterior vitreous detachment     Urinary tract infection, site not specified     Recurrent UTI's         PAST SURGICAL HISTORY:  Past Surgical History:   Procedure Laterality Date     COLONOSCOPY N/A 4/26/2016    Procedure: COLONOSCOPY;  Surgeon: Dontae Del Rio MD;  Location:  GI     ENT SURGERY  1985    Thyroid lobectomy     EYE SURGERY Bilateral 04/20/2021    Cataract Surgery     HC CYSTOSCOPY,INSERT URETERAL STENT      Ureteral stent insertion     HC FLEX SIGMOIDOSCOPY W BIOPSY  5/30/00     HC LAPAROSCOPY, SURGICAL; CHOLECYSTECTOMY  1992      THYROIDECTOMY       LIGATION OF HEMORRHOID(S)      Hemorrhoidectomy     UPPER GI ENDOSCOPY,BIOPSY  10/01/01     ZZC LIGATE FALLOPIAN TUBE       ZZHC COLONOSCOPY W BIOPSY  4/26/00     ZZHC COLONOSCOPY W BIOPSY  10/8/03     ZZHC COLONOSCOPY W BIOPSY  6/27/05    REPEAT IN 5 YEARS         SOCIAL HISTORY:  Social History     Socioeconomic History     Marital status:      Spouse name: Not on file     Number of children: Not on file     Years of education: Not on file     Highest education level: Not on file   Occupational History     Not on file   Tobacco Use     Smoking status: Never     Smokeless tobacco: Never   Vaping Use     Vaping Use: Never used   Substance and Sexual Activity     Alcohol use: Yes     Alcohol/week: 0.0 standard drinks of alcohol     Comment: 1 a day at most     Drug use: No     Sexual activity: Not Currently      Partners: Male   Other Topics Concern     Parent/sibling w/ CABG, MI or angioplasty before 65F 55M? No   Social History Narrative     Not on file     Social Determinants of Health     Financial Resource Strain: Not on file   Food Insecurity: Not on file   Transportation Needs: Not on file   Physical Activity: Not on file   Stress: Not on file   Social Connections: Not on file   Intimate Partner Violence: Not At Risk (2023)    Humiliation, Afraid, Rape, and Kick questionnaire      Fear of Current or Ex-Partner: No      Emotionally Abused: No      Physically Abused: No      Sexually Abused: No   Housing Stability: Not on file         FAMILY HISTORY:  Family History   Problem Relation Age of Onset     Colon Cancer Mother 90     Cerebrovascular Disease Father          at age 85     Dementia Brother      Prostate Cancer Brother      Thyroid Disease Brother      Dementia Brother      Thyroid Disease Brother      Pancreatic Cancer Brother      Breast Cancer Sister      Hyperlipidemia Sister      Depression Sister      Anxiety Disorder Sister      Thyroid Disease Sister      Breast Cancer Sister      Colon Cancer Niece      Other Cancer Niece         leukemia         PHYSICAL EXAM:  /75 (Cuff Size: Adult Regular)   Pulse 70   Temp 98.4  F (36.9  C) (Tympanic)   Resp 16   Ht 1.829 m (6')   Wt 64.9 kg (143 lb)   LMP  (LMP Unknown)   SpO2 97%   BMI 19.39 kg/m    PHYSICAL EXAMINATION:     GENERAL/CONSTITUTIONAL: No acute distress.  RESPIRATORY: Clear to auscultation bilaterally. No crackles or wheezing.   CARDIOVASCULAR: Regular rate and rhythm without murmurs, gallops, or rubs.  MUSCULOSKELETAL: Warm and well-perfused, no cyanosis, clubbing, or edema.  NEUROLOGIC: Cranial nerves II-XII are intact. Alert, oriented, answers questions appropriately.  INTEGUMENTARY: No rashes or jaundice.  GAIT: Steady, does not use assistive device.      LABS:   Latest Reference Range & Units 23 12:52   WBC 4.0 -  11.0 10e3/uL 6.2   Hemoglobin 11.7 - 15.7 g/dL 12.8   Hematocrit 35.0 - 47.0 % 37.1   Platelet Count 150 - 450 10e3/uL 115 (L)   RBC Count 3.80 - 5.20 10e6/uL 3.70 (L)   MCV 78 - 100 fL 100   MCH 26.5 - 33.0 pg 34.6 (H)   MCHC 31.5 - 36.5 g/dL 34.5   RDW 10.0 - 15.0 % 14.1   % Neutrophils % 48   % Lymphocytes % 41   % Monocytes % 11   % Eosinophils % 0   % Basophils % 0   Absolute Basophils 0.0 - 0.2 10e3/uL 0.0   Absolute Eosinophils 0.0 - 0.7 10e3/uL 0.0   Absolute Immature Granulocytes <=0.4 10e3/uL 0.0   Absolute Lymphocytes 0.8 - 5.3 10e3/uL 2.6   Absolute Monocytes 0.0 - 1.3 10e3/uL 0.7   % Immature Granulocytes % 0   Absolute Neutrophils 1.6 - 8.3 10e3/uL 2.9   Absolute NRBCs 10e3/uL 0.0   NRBCs per 100 WBC <1 /100 0   % Retic 0.5 - 2.0 % 1.5   Absolute Retic 0.025 - 0.095 10e6/uL 0.057      [facility] Reference Range & Units 06/07/23 12:49   Haptoglobin 32 - 197 mg/dL <3 (L)   IGA 84 - 499 mg/dL <2 (L)    - 1,616 mg/dL 423 (L)   IGM 35 - 242 mg/dL <10 (L)       IMAGING:  BILATERAL FULL FIELD DIGITAL SCREENING MAMMOGRAM WITH TOMOSYNTHESIS     Performed on: 6/23/22     IMPRESSION: ACR BI-RADS Category 1: Negative     RECOMMENDED FOLLOW-UP: Annual routine screening mammogram          ASSESSMENT/PLAN:  Tricia Sosa is an 82 year old female with:        1. 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 Common variable immunodeficiency disorder  4. Splenomegaly           1) Autoimmune hemolytic anemia: She had relapse in July 2020, and started on prednisone, as well as weekly rituximab infusions x 4, completed on 8/21/20.  Since then, she has been off and on prednisone and rituximab, getting response each time, but relapses are getting more frequent.       She does not want to consider splenectomy yet.      She is completing another 4 doses of weekly rituxumab in May 2022, and then will go on maintenance rituximab  to try to maintain her hemoglobin longer.  She wants to try to avoid having to go back on steroids if possible. She previously tapered off of prednisone again as of early March 2022. Off bactrim as well.    -Hemoglobin is stable in the 12's.  -Monitor CBC monthly.  -Continue maintenance rituximab every 2 months.     2) CVID:   -Continue IVIG.  She gets it, if IgG is <600.   -IgG check and IVIG every 4 weeks if meets parameters  -She saw immunology in [place] who recommended IVIG monthly, but our financial team continues to check and says that it is only covered with IgG is <600.      3) Iron-deficiency anemia  -s/p Venofer in May 2022.     4) Thrombocytopenia  -Waxing/waning.  -Monitor.    5) Left neck cyst  -Should be okay for removal so long as platelets are stable.     6) -Okay for rituximab today.  -Alternate follow up between FELIZ and myself every 2 months with date of rituximab.  -Continue IVIG as is if meeting parameters.    Leandra Ryder, DO  Hematology/Oncology  [facility]

## 2023-06-27 NOTE — PROGRESS NOTES
Nursing Note:  Tricia Sosa presents today for Labs + PIV insertion for infusion.    Patient to be seen by provider today: Yes: Dr. Leandra Ryder   present during visit today: Not Applicable.    Note: N/A.    Intravenous Access:  Labs drawn without difficulty.  Peripheral IV placed.    Discharge Plan:   Patient was sent to [facility] for provider appointment, followed by infusion.      Hayes Hernandez RN

## 2023-06-27 NOTE — NURSING NOTE
Oncology Rooming Note    June 27, 2023 1:25 PM   Tricia Sosa is a 82 year old female who presents for:    Chief Complaint   Patient presents with     Oncology Clinic Visit     Anemia, iron deficiency     Initial Vitals: /75 (Cuff Size: Adult Regular)   Pulse 70   Temp 98.4  F (36.9  C) (Tympanic)   Resp 16   Ht 1.829 m (6')   Wt 64.9 kg (143 lb)   LMP  (LMP Unknown)   SpO2 97%   BMI 19.39 kg/m   Estimated body mass index is 19.39 kg/m  as calculated from the following:    Height as of this encounter: 1.829 m (6').    Weight as of this encounter: 64.9 kg (143 lb). Body surface area is 1.82 meters squared.  No Pain (0) Comment: Data Unavailable   No LMP recorded (lmp unknown). Patient is postmenopausal.  Allergies reviewed: Yes  Medications reviewed: Yes    Medications: Medication refills not needed today.  Pharmacy name entered into [facility]:    [place], [place] - [address] [id number] [place], [place] - [address]    Clinical concerns: f/u       Corrie Hurt, MOHSEN

## 2023-06-27 NOTE — LETTER
6/27/2023         RE: Tricia Sosa  [address]  [place] [address]        Dear Colleague,    Thank you for referring your patient, Tricia Sosa, to the [facility]. Please see a copy of my visit note below.    [facility]    Hematology/Oncology Established Patient Follow-up Note    Treatment Summary:      Today's Date: 6/27/23    Reason for Follow-up: Hemolytic anemia-autoimmune; IgA deficiency     HISTORY OF PRESENT ILLNESS: Tricia Sosa is an 82 year old female who presents with autoimmune hemolytic anemia and IgA deficiency.  She previously saw Dr. Matos and then Dr. Summers.  She was previously seen at [facility].     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  She was very sick at the time and had severe diarrhea.  She lost about 40 pounds in weight.  The workup revealed that she had markedly diminished CD4 counts of 48 and was diagnosed with CMV colitis.  Since then she has been followed in hematology clinic at [place] until recently.  She was noted to have anemia which was fairly long-standing.  She was initially referred for anemia in 2004 and also had a bone marrow biopsy done at that time which revealed hypercellular bone marrow with 70-80% cellularity and normal trilineage hematopoiesis and no morphologic features of MDS or lymphoproliferation.  Her anemia was attributed to her chronic underlying disease.  At the next evaluation in 2002 on 4 aggressive anemia there was no evidence of hemolysis, iron deficiency, B12 or folate deficiency.  Bone marrow biopsy was not pursued.  In summer of 2015 she had progressive fatigue, dyspnea on exertion and worsening anemia with a hemoglobin now of 9.  Workup at this time did suggest a hemolytic anemia with elevated LDH at 709, undetectable haptoglobin and elevated unconjugated bilirubin at 3.3.  Monospecific MARIKA was positive for both IgG and  complement.  Cold agglutinin screen was positive with very low titer 1:64.  She was started on prednisone 60 mg daily with supplementation of iron folate and B12 starting 7/31/15.  Her prednisone has been slowly tapered and was discontinued off in January 2016.  She was last followed at [facility] on March 10, 2016 when she had stable hemoglobin.     She was followed by Dr. Matos and Dr. Summers.  Due to relapse of hemolytic anemia, she underwent another course of prednisone that has since been tapered off and rituximab x 4 weekly doses completed in 9/19/19.     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  She started weekly Rituxan on 7/31/20 x 4 doses, completed on 8/21/20.  She tapered off of prednisone in October 2020.     Due to relapse of her AIHA, she started prednisone again on 6/8/21 at 60 mg daily with slow taper and finished taper in August 2021.     Due to relapse of AIHA again, she started prednisone again on 11/2/21 at 70 mg daily with slow taper.  She also completed weekly Rituxan again x 4 doses 12/6/21-12/29/21.    Venofer 5/5-5/19/22.      INTERIM HISTORY:  Patient continues to do well.  She continues rituximab on maintenance every 2 months. She continues on IVIG as well.    No weight loss, fatigue, LAD, night sweats, fever. No gross evidence of bleed. She has a cyst of the left neck that is planned for removal.        REVIEW OF SYSTEMS:   A 14 point ROS was reviewed with pertinent positives and negatives in the HPI.       HOME MEDICATIONS:  Current Outpatient Medications   Medication Sig Dispense Refill     cefdinir (OMNICEF) 300 MG capsule Take 300 mg by mouth 2 times daily       cyanocobalamin (VITAMIN  B-12) 1000 MCG tablet   3     folic acid (FOLVITE) 1 MG tablet   3     ketoconazole (NIZORAL) 2 % external shampoo Use every other day to scalp as needed 120 mL 0     levothyroxine (SYNTHROID/LEVOTHROID) 150 MCG tablet Take one tablet  by  mouth daily 90 tablet 2     UNKNOWN TO PATIENT Prednisone taper for total of 16 days per Dr. 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, and posterior vitreous detachment     Urinary tract infection, site not specified     Recurrent UTI's         PAST SURGICAL HISTORY:  Past Surgical History:   Procedure Laterality Date     COLONOSCOPY N/A 4/26/2016    Procedure: COLONOSCOPY;  Surgeon: Dontae Del Rio MD;  Location:  GI     ENT SURGERY  1985    Thyroid lobectomy     EYE SURGERY Bilateral 04/20/2021    Cataract Surgery     HC CYSTOSCOPY,INSERT URETERAL STENT      Ureteral stent insertion     HC FLEX SIGMOIDOSCOPY W BIOPSY  5/30/00      LAPAROSCOPY, SURGICAL; CHOLECYSTECTOMY  1992      THYROIDECTOMY       LIGATION OF HEMORRHOID(S)      Hemorrhoidectomy     UPPER GI ENDOSCOPY,BIOPSY  10/01/01     ZZC LIGATE FALLOPIAN TUBE       ZZHC COLONOSCOPY W BIOPSY  4/26/00     ZZHC COLONOSCOPY W BIOPSY  10/8/03     ZZHC COLONOSCOPY W BIOPSY  6/27/05    REPEAT IN 5 YEARS         SOCIAL HISTORY:  Social History     Socioeconomic History     Marital status:      Spouse name: Not on file     Number of children: Not on file     Years of education: Not on file     Highest education level: Not on file   Occupational History     Not on file   Tobacco Use      Smoking status: Never     Smokeless tobacco: Never   Vaping Use     Vaping Use: Never used   Substance and Sexual Activity     Alcohol use: Yes     Alcohol/week: 0.0 standard drinks of alcohol     Comment: 1 a day at most     Drug use: No     Sexual activity: Not Currently     Partners: Male   Other Topics Concern     Parent/sibling w/ CABG, MI or angioplasty before 65F 55M? No   Social History Narrative     Not on file     Social Determinants of Health     Financial Resource Strain: Not on file   Food Insecurity: Not on file   Transportation Needs: Not on file   Physical Activity: Not on file   Stress: Not on file   Social Connections: Not on file   Intimate Partner Violence: Not At Risk (2023)    Humiliation, Afraid, Rape, and Kick questionnaire      Fear of Current or Ex-Partner: No      Emotionally Abused: No      Physically Abused: No      Sexually Abused: No   Housing Stability: Not on file         FAMILY HISTORY:  Family History   Problem Relation Age of Onset     Colon Cancer Mother 90     Cerebrovascular Disease Father          at age 85     Dementia Brother      Prostate Cancer Brother      Thyroid Disease Brother      Dementia Brother      Thyroid Disease Brother      Pancreatic Cancer Brother      Breast Cancer Sister      Hyperlipidemia Sister      Depression Sister      Anxiety Disorder Sister      Thyroid Disease Sister      Breast Cancer Sister      Colon Cancer Niece      Other Cancer Niece         leukemia         PHYSICAL EXAM:  /75 (Cuff Size: Adult Regular)   Pulse 70   Temp 98.4  F (36.9  C) (Tympanic)   Resp 16   Ht 1.829 m (6')   Wt 64.9 kg (143 lb)   LMP  (LMP Unknown)   SpO2 97%   BMI 19.39 kg/m    PHYSICAL EXAMINATION:     GENERAL/CONSTITUTIONAL: No acute distress.  RESPIRATORY: Clear to auscultation bilaterally. No crackles or wheezing.   CARDIOVASCULAR: Regular rate and rhythm without murmurs, gallops, or rubs.  MUSCULOSKELETAL: Warm and well-perfused, no  cyanosis, clubbing, or edema.  NEUROLOGIC: Cranial nerves II-XII are intact. Alert, oriented, answers questions appropriately.  INTEGUMENTARY: No rashes or jaundice.  GAIT: Steady, does not use assistive device.      LABS:   Latest Reference Range & Units 06/27/23 12:52   WBC 4.0 - 11.0 10e3/uL 6.2   Hemoglobin 11.7 - 15.7 g/dL 12.8   Hematocrit 35.0 - 47.0 % 37.1   Platelet Count 150 - 450 10e3/uL 115 (L)   RBC Count 3.80 - 5.20 10e6/uL 3.70 (L)   MCV 78 - 100 fL 100   MCH 26.5 - 33.0 pg 34.6 (H)   MCHC 31.5 - 36.5 g/dL 34.5   RDW 10.0 - 15.0 % 14.1   % Neutrophils % 48   % Lymphocytes % 41   % Monocytes % 11   % Eosinophils % 0   % Basophils % 0   Absolute Basophils 0.0 - 0.2 10e3/uL 0.0   Absolute Eosinophils 0.0 - 0.7 10e3/uL 0.0   Absolute Immature Granulocytes <=0.4 10e3/uL 0.0   Absolute Lymphocytes 0.8 - 5.3 10e3/uL 2.6   Absolute Monocytes 0.0 - 1.3 10e3/uL 0.7   % Immature Granulocytes % 0   Absolute Neutrophils 1.6 - 8.3 10e3/uL 2.9   Absolute NRBCs 10e3/uL 0.0   NRBCs per 100 WBC <1 /100 0   % Retic 0.5 - 2.0 % 1.5   Absolute Retic 0.025 - 0.095 10e6/uL 0.057      Latest Reference Range & Units 06/07/23 12:49   Haptoglobin 32 - 197 mg/dL <3 (L)   IGA 84 - 499 mg/dL <2 (L)    - 1,616 mg/dL 423 (L)   IGM 35 - 242 mg/dL <10 (L)       IMAGING:  BILATERAL FULL FIELD DIGITAL SCREENING MAMMOGRAM WITH TOMOSYNTHESIS     Performed on: 6/23/22     IMPRESSION: ACR BI-RADS Category 1: Negative     RECOMMENDED FOLLOW-UP: Annual routine screening mammogram          ASSESSMENT/PLAN:  Tricia Sosa is an 82 year old female with:        1. 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 Common variable immunodeficiency disorder  4. Splenomegaly           1) Autoimmune hemolytic anemia: She had relapse in July 2020, and started on prednisone, as well as weekly rituximab infusions x 4, completed on 8/21/20.  Since then, she  has been off and on prednisone and rituximab, getting response each time, but relapses are getting more frequent.       She does not want to consider splenectomy yet.      She is completing another 4 doses of weekly rituxumab in May 2022, and then will go on maintenance rituximab to try to maintain her hemoglobin longer.  She wants to try to avoid having to go back on steroids if possible. She previously tapered off of prednisone again as of early March 2022. Off bactrim as well.    -Hemoglobin is stable in the 12's.  -Monitor CBC monthly.  -Continue maintenance rituximab every 2 months.     2) CVID:   -Continue IVIG.  She gets it, if IgG is <600.   -IgG check and IVIG every 4 weeks if meets parameters  -She saw immunology in [place] who recommended IVIG monthly, but our financial team continues to check and says that it is only covered with IgG is <600.      3) Iron-deficiency anemia  -s/p Venofer in May 2022.     4) Thrombocytopenia  -Waxing/waning.  -Monitor.    5) Left neck cyst  -Should be okay for removal so long as platelets are stable.     6) -Okay for rituximab today.  -Alternate follow up between FELIZ and myself every 2 months with date of rituximab.  -Continue IVIG as is if meeting parameters.    Leandra Ryder, DO  Hematology/Oncology  [facility]        Again, thank you for allowing me to participate in the care of your patient.        Sincerely,        Leandra Ryder, DO

## 2023-06-28 LAB
HAPTOGLOB SERPL-MCNC: <3 MG/DL (ref 32–197)
IGA SERPL-MCNC: <2 MG/DL (ref 84–499)
IGG SERPL-MCNC: 688 MG/DL (ref 610–1616)
IGM SERPL-MCNC: <10 MG/DL (ref 35–242)

## 2023-07-12 ENCOUNTER — THERAPY VISIT (OUTPATIENT)
Dept: PHYSICAL THERAPY | Facility: CLINIC | Age: 83
End: 2023-07-12
Attending: STUDENT IN AN ORGANIZED HEALTH CARE EDUCATION/TRAINING PROGRAM
Payer: MEDICARE

## 2023-07-12 DIAGNOSIS — D59.10 AUTOIMMUNE HEMOLYTIC ANEMIA (H): ICD-10-CM

## 2023-07-12 DIAGNOSIS — M62.81 GENERALIZED MUSCLE WEAKNESS: Primary | ICD-10-CM

## 2023-07-12 DIAGNOSIS — R53.83 OTHER FATIGUE: ICD-10-CM

## 2023-07-12 PROCEDURE — 97116 GAIT TRAINING THERAPY: CPT | Mod: GP | Performed by: PHYSICAL THERAPIST

## 2023-07-12 PROCEDURE — 97110 THERAPEUTIC EXERCISES: CPT | Mod: GP | Performed by: PHYSICAL THERAPIST

## 2023-07-25 ENCOUNTER — THERAPY VISIT (OUTPATIENT)
Dept: PHYSICAL THERAPY | Facility: CLINIC | Age: 83
End: 2023-07-25
Attending: STUDENT IN AN ORGANIZED HEALTH CARE EDUCATION/TRAINING PROGRAM
Payer: MEDICARE

## 2023-07-25 DIAGNOSIS — R53.83 OTHER FATIGUE: ICD-10-CM

## 2023-07-25 DIAGNOSIS — M62.81 GENERALIZED MUSCLE WEAKNESS: Primary | ICD-10-CM

## 2023-07-25 PROCEDURE — 97112 NEUROMUSCULAR REEDUCATION: CPT | Mod: GP | Performed by: PHYSICAL THERAPIST

## 2023-07-25 PROCEDURE — 97110 THERAPEUTIC EXERCISES: CPT | Mod: GP | Performed by: PHYSICAL THERAPIST

## 2023-08-02 ENCOUNTER — LAB (OUTPATIENT)
Dept: ONCOLOGY | Facility: CLINIC | Age: 83
End: 2023-08-02
Attending: INTERNAL MEDICINE
Payer: MEDICARE

## 2023-08-02 DIAGNOSIS — D59.10 AUTOIMMUNE HEMOLYTIC ANEMIA (H): ICD-10-CM

## 2023-08-02 LAB
ALBUMIN SERPL BCG-MCNC: 4.5 G/DL (ref 3.5–5.2)
ALP SERPL-CCNC: 112 U/L (ref 35–104)
ALT SERPL W P-5'-P-CCNC: 15 U/L (ref 0–50)
ANION GAP SERPL CALCULATED.3IONS-SCNC: 6 MMOL/L (ref 7–15)
AST SERPL W P-5'-P-CCNC: 28 U/L (ref 0–45)
BASOPHILS # BLD AUTO: 0 10E3/UL (ref 0–0.2)
BASOPHILS NFR BLD AUTO: 0 %
BILIRUB SERPL-MCNC: 0.7 MG/DL
BUN SERPL-MCNC: 13.4 MG/DL (ref 8–23)
CALCIUM SERPL-MCNC: 8.6 MG/DL (ref 8.8–10.2)
CHLORIDE SERPL-SCNC: 102 MMOL/L (ref 98–107)
CREAT SERPL-MCNC: 0.6 MG/DL (ref 0.51–0.95)
DEPRECATED HCO3 PLAS-SCNC: 29 MMOL/L (ref 22–29)
EOSINOPHIL # BLD AUTO: 0 10E3/UL (ref 0–0.7)
EOSINOPHIL NFR BLD AUTO: 0 %
ERYTHROCYTE [DISTWIDTH] IN BLOOD BY AUTOMATED COUNT: 13.7 % (ref 10–15)
GFR SERPL CREATININE-BSD FRML MDRD: 89 ML/MIN/1.73M2
GLUCOSE SERPL-MCNC: 84 MG/DL (ref 70–99)
HCT VFR BLD AUTO: 35.6 % (ref 35–47)
HGB BLD-MCNC: 12.1 G/DL (ref 11.7–15.7)
IMM GRANULOCYTES # BLD: 0 10E3/UL
IMM GRANULOCYTES NFR BLD: 0 %
LDH SERPL L TO P-CCNC: 226 U/L (ref 0–250)
LYMPHOCYTES # BLD AUTO: 2.8 10E3/UL (ref 0.8–5.3)
LYMPHOCYTES NFR BLD AUTO: 36 %
MCH RBC QN AUTO: 34.6 PG (ref 26.5–33)
MCHC RBC AUTO-ENTMCNC: 34 G/DL (ref 31.5–36.5)
MCV RBC AUTO: 102 FL (ref 78–100)
MONOCYTES # BLD AUTO: 0.8 10E3/UL (ref 0–1.3)
MONOCYTES NFR BLD AUTO: 10 %
NEUTROPHILS # BLD AUTO: 4.2 10E3/UL (ref 1.6–8.3)
NEUTROPHILS NFR BLD AUTO: 54 %
NRBC # BLD AUTO: 0 10E3/UL
NRBC BLD AUTO-RTO: 0 /100
PLATELET # BLD AUTO: 149 10E3/UL (ref 150–450)
POTASSIUM SERPL-SCNC: 4.3 MMOL/L (ref 3.4–5.3)
PROT SERPL-MCNC: 5.7 G/DL (ref 6.4–8.3)
RBC # BLD AUTO: 3.5 10E6/UL (ref 3.8–5.2)
RETICS # AUTO: 0.08 10E6/UL (ref 0.03–0.1)
RETICS/RBC NFR AUTO: 2.2 % (ref 0.5–2)
SODIUM SERPL-SCNC: 137 MMOL/L (ref 136–145)
WBC # BLD AUTO: 7.8 10E3/UL (ref 4–11)

## 2023-08-02 PROCEDURE — 80053 COMPREHEN METABOLIC PANEL: CPT | Performed by: INTERNAL MEDICINE

## 2023-08-02 PROCEDURE — 83615 LACTATE (LD) (LDH) ENZYME: CPT | Performed by: INTERNAL MEDICINE

## 2023-08-02 PROCEDURE — 82784 ASSAY IGA/IGD/IGG/IGM EACH: CPT | Performed by: INTERNAL MEDICINE

## 2023-08-02 PROCEDURE — 85025 COMPLETE CBC W/AUTO DIFF WBC: CPT | Performed by: INTERNAL MEDICINE

## 2023-08-02 PROCEDURE — 83010 ASSAY OF HAPTOGLOBIN QUANT: CPT | Performed by: INTERNAL MEDICINE

## 2023-08-02 PROCEDURE — 36415 COLL VENOUS BLD VENIPUNCTURE: CPT

## 2023-08-02 PROCEDURE — 85045 AUTOMATED RETICULOCYTE COUNT: CPT | Performed by: INTERNAL MEDICINE

## 2023-08-03 ENCOUNTER — DOCUMENTATION ONLY (OUTPATIENT)
Dept: PHARMACY | Facility: CLINIC | Age: 83
End: 2023-08-03
Payer: MEDICARE

## 2023-08-03 LAB
HAPTOGLOB SERPL-MCNC: <3 MG/DL (ref 32–197)
IGA SERPL-MCNC: <2 MG/DL (ref 84–499)
IGG SERPL-MCNC: 413 MG/DL (ref 610–1616)
IGM SERPL-MCNC: 12 MG/DL (ref 35–242)

## 2023-08-03 NOTE — PROGRESS NOTES
Pharmacist IVIG Stewardship Program    Diagnosis: Common Variable Immunodeficiency  Patient was originally diagnosed with [facility] in the 2000's and later transferred her care to [facility]   Date 5/27/2015   IgA Level  Undetectable   IgG Total 658   IgG2 106   IgG4 0.5   IgM Level 48       Current Dosing Regimen: Privigen 35g IV monthly.    Titrated down from every 3 week infusions to every 4 week infusions in 2017  Titrated down further from every 4 weeks to every 6 weeks infusions in March 2018, but patient declined drastically and had to be escalated back to monthly infusions in September 2018  Pre-medications:  Methylprednisolone 20 mg IV push prior to infusion  Current Titration Regimen: 0.5ml/kg/hr and increased by 0.5ml/kg/hr every 15 minutes, max rate 4ml/kg/hr.  Previously Tried Products: Gammagard SD, currently on Privigen    Side Effects: Patient denies side effects such as headaches, flushing, fever, muscle or joint pain, nausea, or rash/itching.    Interventions: None.      Assessment:   Date  8/2/23   IgA <2   IgM 12   IgG 413   Patient's level is below standard range and she is appropriate for additional IVIG therapy. She is tolerating infusions well and IVIG infusions are effective in increasing Ig levels to an appropriate level to minimize patients risk of infection. Patient should continue on treatment as she has been per provider orders, without therapy her levels would drop below therapeutic range.    Plan: Patient is okay to proceed with infusions as planned per provider order through until her next Immunoglobulin lab draw.     Next Review Needed: 9/2023    Lynsey Narvaez, PharmD, IgCP  Medication Access Pharmacist

## 2023-08-04 ENCOUNTER — INFUSION THERAPY VISIT (OUTPATIENT)
Dept: INFUSION THERAPY | Facility: CLINIC | Age: 83
End: 2023-08-04
Attending: INTERNAL MEDICINE
Payer: MEDICARE

## 2023-08-04 VITALS
WEIGHT: 144 LBS | RESPIRATION RATE: 16 BRPM | SYSTOLIC BLOOD PRESSURE: 104 MMHG | TEMPERATURE: 98.1 F | BODY MASS INDEX: 19.53 KG/M2 | DIASTOLIC BLOOD PRESSURE: 53 MMHG | HEART RATE: 69 BPM | OXYGEN SATURATION: 95 %

## 2023-08-04 DIAGNOSIS — D59.10 AUTOIMMUNE HEMOLYTIC ANEMIA (H): ICD-10-CM

## 2023-08-04 DIAGNOSIS — D59.19 OTHER AUTOIMMUNE HEMOLYTIC ANEMIA (H): ICD-10-CM

## 2023-08-04 DIAGNOSIS — D80.3 SELECTIVE DEFICIENCY OF IGG SUBCLASSES (H): ICD-10-CM

## 2023-08-04 DIAGNOSIS — D83.9 CVID (COMMON VARIABLE IMMUNODEFICIENCY) (H): Primary | ICD-10-CM

## 2023-08-04 PROCEDURE — 250N000011 HC RX IP 250 OP 636: Mod: JZ | Performed by: INTERNAL MEDICINE

## 2023-08-04 PROCEDURE — 96366 THER/PROPH/DIAG IV INF ADDON: CPT

## 2023-08-04 PROCEDURE — 96375 TX/PRO/DX INJ NEW DRUG ADDON: CPT

## 2023-08-04 PROCEDURE — 96365 THER/PROPH/DIAG IV INF INIT: CPT

## 2023-08-04 RX ORDER — ALBUTEROL SULFATE 90 UG/1
1-2 AEROSOL, METERED RESPIRATORY (INHALATION)
Status: CANCELLED
Start: 2023-08-19

## 2023-08-04 RX ORDER — DIPHENHYDRAMINE HYDROCHLORIDE 50 MG/ML
50 INJECTION INTRAMUSCULAR; INTRAVENOUS
Status: CANCELLED
Start: 2023-08-19

## 2023-08-04 RX ORDER — EPINEPHRINE 1 MG/ML
0.3 INJECTION, SOLUTION INTRAMUSCULAR; SUBCUTANEOUS EVERY 5 MIN PRN
Status: CANCELLED | OUTPATIENT
Start: 2023-08-19

## 2023-08-04 RX ORDER — METHYLPREDNISOLONE SODIUM SUCCINATE 40 MG/ML
20 INJECTION, POWDER, LYOPHILIZED, FOR SOLUTION INTRAMUSCULAR; INTRAVENOUS ONCE
Status: COMPLETED | OUTPATIENT
Start: 2023-08-04 | End: 2023-08-04

## 2023-08-04 RX ORDER — TRIAMCINOLONE ACETONIDE 1 MG/G
OINTMENT TOPICAL 2 TIMES DAILY
COMMUNITY

## 2023-08-04 RX ORDER — METHYLPREDNISOLONE SODIUM SUCCINATE 125 MG/2ML
125 INJECTION, POWDER, LYOPHILIZED, FOR SOLUTION INTRAMUSCULAR; INTRAVENOUS
Status: CANCELLED
Start: 2023-08-19

## 2023-08-04 RX ORDER — HEPARIN SODIUM (PORCINE) LOCK FLUSH IV SOLN 100 UNIT/ML 100 UNIT/ML
5 SOLUTION INTRAVENOUS
Status: CANCELLED | OUTPATIENT
Start: 2023-08-19

## 2023-08-04 RX ORDER — HEPARIN SODIUM,PORCINE 10 UNIT/ML
5 VIAL (ML) INTRAVENOUS
Status: CANCELLED | OUTPATIENT
Start: 2023-08-19

## 2023-08-04 RX ORDER — MEPERIDINE HYDROCHLORIDE 25 MG/ML
25 INJECTION INTRAMUSCULAR; INTRAVENOUS; SUBCUTANEOUS EVERY 30 MIN PRN
Status: CANCELLED | OUTPATIENT
Start: 2023-08-19

## 2023-08-04 RX ORDER — METHYLPREDNISOLONE SODIUM SUCCINATE 40 MG/ML
20 INJECTION, POWDER, LYOPHILIZED, FOR SOLUTION INTRAMUSCULAR; INTRAVENOUS ONCE
Status: CANCELLED
Start: 2023-09-29 | End: 2023-08-19

## 2023-08-04 RX ORDER — ALBUTEROL SULFATE 0.83 MG/ML
2.5 SOLUTION RESPIRATORY (INHALATION)
Status: CANCELLED | OUTPATIENT
Start: 2023-08-19

## 2023-08-04 RX ORDER — NALOXONE HYDROCHLORIDE 0.4 MG/ML
0.2 INJECTION, SOLUTION INTRAMUSCULAR; INTRAVENOUS; SUBCUTANEOUS
Status: CANCELLED | OUTPATIENT
Start: 2023-08-19

## 2023-08-04 RX ADMIN — HUMAN IMMUNOGLOBULIN G 35 G: 20 LIQUID INTRAVENOUS at 12:39

## 2023-08-04 RX ADMIN — METHYLPREDNISOLONE SODIUM SUCCINATE 20 MG: 40 INJECTION, POWDER, FOR SOLUTION INTRAMUSCULAR; INTRAVENOUS at 12:33

## 2023-08-04 ASSESSMENT — PAIN SCALES - GENERAL: PAINLEVEL: NO PAIN (0)

## 2023-08-04 NOTE — PROGRESS NOTES
Infusion Nursing Note:  Tricia Sosa presents today for IVIG.    Patient seen by provider today: No   present during visit today: Not Applicable.    Note: N/A.      Intravenous Access:  Peripheral IV placed.    Treatment Conditions:  Patient denies any fevers, cough, or recent surgeries. Patient does report current sinus infection. Spoke with Dr Ryder, who confirmed it is ok to proceed with IVIG today.      Post Infusion Assessment:  Patient tolerated infusion without incident.  Blood return noted pre and post infusion.  Site patent and intact, free from redness, edema or discomfort.  No evidence of extravasations.  Access discontinued per protocol.       Discharge Plan:   Discharge instructions reviewed with: Patient.  Patient and/or family verbalized understanding of discharge instructions and all questions answered.  AVS to patient via [employer]ed[employer].  Patient will return 8/22 for next appointment.   Patient discharged in stable condition accompanied by: self.  Departure Mode: Ambulatory.      Pari Griffin RN

## 2023-08-08 ENCOUNTER — THERAPY VISIT (OUTPATIENT)
Dept: PHYSICAL THERAPY | Facility: CLINIC | Age: 83
End: 2023-08-08
Attending: STUDENT IN AN ORGANIZED HEALTH CARE EDUCATION/TRAINING PROGRAM
Payer: MEDICARE

## 2023-08-08 DIAGNOSIS — M62.81 GENERALIZED MUSCLE WEAKNESS: Primary | ICD-10-CM

## 2023-08-08 PROCEDURE — 97116 GAIT TRAINING THERAPY: CPT | Mod: GP | Performed by: PHYSICAL THERAPIST

## 2023-08-08 PROCEDURE — 97112 NEUROMUSCULAR REEDUCATION: CPT | Mod: GP | Performed by: PHYSICAL THERAPIST

## 2023-08-08 PROCEDURE — 97110 THERAPEUTIC EXERCISES: CPT | Mod: GP | Performed by: PHYSICAL THERAPIST

## 2023-08-16 NOTE — PROGRESS NOTES
Oncology/Hematology Visit Note  Aug 22, 2023    Reason for Visit: Follow up of CVID, autoimmune hemolytic anemia, iron deficiency      HISTORY OF PRESENT ILLNESS: Tricia Sosa is a 82 year old female who presents with autoimmune hemolytic anemia and IgA deficiency.  She previously saw Dr. Matos and then Dr. Summers.  She was previously seen at [facility].     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  She was very sick at the time and had severe diarrhea.  She lost about 40 pounds in weight.  The workup revealed that she had markedly diminished CD4 counts of 48 and was diagnosed with CMV colitis.  Since then she has been followed in hematology clinic at [place] until recently.  She was noted to have anemia which was fairly long-standing.  She was initially referred for anemia in 2004 and also had a bone marrow biopsy done at that time which revealed hypercellular bone marrow with 70-80% cellularity and normal trilineage hematopoiesis and no morphologic features of MDS or lymphoproliferation.  Her anemia was attributed to her chronic underlying disease.  At the next evaluation in 2002 on 4 aggressive anemia there was no evidence of hemolysis, iron deficiency, B12 or folate deficiency.  Bone marrow biopsy was not pursued.  In summer of 2015 she had progressive fatigue, dyspnea on exertion and worsening anemia with a hemoglobin now of 9.  Workup at this time did suggest a hemolytic anemia with elevated LDH at 709, undetectable haptoglobin and elevated unconjugated bilirubin at 3.3.  Monospecific MARIKA was positive for both IgG and complement.  Cold agglutinin screen was positive with very low titer 1:64.  She was started on prednisone 60 mg daily with supplementation of iron folate and B12 starting 7/31/15.  Her prednisone has been slowly tapered and was discontinued off in January 2016.  She was last followed at [facility] on March 10, 2016 when she had stable hemoglobin.      She was followed by Dr. Matos and Dr. Summers.  Due to relapse of hemolytic anemia, she underwent another course of prednisone that has since been tapered off and rituximab x 4 weekly doses completed in 9/19/19.     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  She started weekly Rituxan on 7/31/20 x 4 doses, completed on 8/21/20.  She tapered off of prednisone in October 2020.     Due to relapse of her AIHA, she started prednisone again on 6/8/21 at 60 mg daily with slow taper and finished taper in August 2021.     Due to relapse of AIHA again, she started prednisone again on 11/2/21 at 70 mg daily with slow taper.  She also completed weekly Rituxan again x 4 doses 12/6/21-12/29/21.     Venofer 5/5-5/19/22.     She is now on Rituxan every 2 months and IVIG monthly if IgG <600.    Interval History:  Tricia is overall doing well. Working with PT to build strength. Sinus infection resolved. Denies any new concerns.     Review of Systems:  Patient denies fevers, chills, night sweats, unexplained weight changes, headaches, dizziness, vision or hearing changes, new lumps or bumps, chest pain, shortness of breath, cough, abdominal pain, nausea, vomiting, changes to bowel or bladder, swelling of extremities, bleeding issues, or rash.    Current Outpatient Medications   Medication Sig Dispense Refill    cefdinir (OMNICEF) 300 MG capsule Take 300 mg by mouth 2 times daily      cyanocobalamin (VITAMIN  B-12) 1000 MCG tablet   3    folic acid (FOLVITE) 1 MG tablet   3    ketoconazole (NIZORAL) 2 % external shampoo Use every other day to scalp as needed 120 mL 0    levothyroxine (SYNTHROID/LEVOTHROID) 150 MCG tablet Take one tablet  by mouth daily 90 tablet 2    triamcinolone (KENALOG) 0.1 % external ointment Apply topically 2 times daily      UNKNOWN TO PATIENT Prednisone taper for total of 16 days per Dr. 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, and posterior vitreous detachment    Urinary tract infection, site not specified     Recurrent UTI's     Past Surgical History:   Procedure Laterality Date    COLONOSCOPY N/A 4/26/2016    Procedure: COLONOSCOPY;  Surgeon: Dontae Del Rio MD;  Location:  GI    ENT SURGERY  1985    Thyroid lobectomy    EYE SURGERY Bilateral 04/20/2021    Cataract Surgery    HC CYSTOSCOPY,INSERT URETERAL STENT      Ureteral stent insertion    HC FLEX SIGMOIDOSCOPY W BIOPSY  5/30/00    HC LAPAROSCOPY, SURGICAL; CHOLECYSTECTOMY  1992     THYROIDECTOMY      LIGATION OF HEMORRHOID(S)      Hemorrhoidectomy    UPPER GI ENDOSCOPY,BIOPSY  10/01/01    ZZC LIGATE FALLOPIAN TUBE      ZZHC COLONOSCOPY W BIOPSY  4/26/00    ZZHC COLONOSCOPY W BIOPSY  10/8/03    ZZHC COLONOSCOPY W BIOPSY  6/27/05    REPEAT IN 5 YEARS     Allergies   Allergen Reactions    Doxycycline      Social History   Social History     Tobacco Use    Smoking status: Never    Smokeless tobacco: Never   Vaping Use    Vaping Use: Never used   Substance Use Topics    Alcohol use: Yes     Alcohol/week: 0.0 standard drinks of alcohol     Comment: 1 a day at most    Drug use: No      Past medical history and social history were reviewed.    Physical Examination:  BP (!) 147/70 (Cuff Size: Adult Regular)   Pulse 77   Temp 98  F (36.7  C) (Tympanic)   Resp 16   Ht 1.829 m (6')   Wt 65.8 kg (145 lb)   LMP  (LMP  Unknown)   SpO2 95%   BMI 19.67 kg/m    Wt Readings from Last 10 Encounters:   08/04/23 65.3 kg (144 lb)   06/27/23 64.9 kg (143 lb)   06/09/23 65.8 kg (145 lb 1.6 oz)   05/02/23 67.9 kg (149 lb 9.6 oz)   04/18/23 68.2 kg (150 lb 6.4 oz)   04/14/23 68.9 kg (151 lb 12.8 oz)   03/07/23 68.5 kg (151 lb)   02/10/23 69.1 kg (152 lb 4.8 oz)   01/06/23 67.9 kg (149 lb 12.8 oz)   12/09/22 67.3 kg (148 lb 4.8 oz)     Constitutional: Well-appearing female in no acute distress.  Eyes: EOMI, PERRL. No scleral icterus.  ENT: Oral mucosa is moist without lesions or thrush.   Lymphatic: Neck is supple without cervical or supraclavicular lymphadenopathy. Cyst on L neck.  Cardiovascular: Regular rate and rhythm. No murmurs, gallops, or rubs. No peripheral edema.  Respiratory: Clear to auscultation bilaterally. No wheezes or crackles.  Gastrointestinal: Bowel sounds present. Abdomen soft, non-tender.   Neurologic: Cranial nerves II through XII are grossly intact.  Skin: Few small bruises on arms.     Laboratory Data:   Latest Reference Range & Units 08/22/23 12:57   Sodium 136 - 145 mmol/L 140   Potassium 3.4 - 5.3 mmol/L 4.1   Chloride 98 - 107 mmol/L 105   Carbon Dioxide (CO2) 22 - 29 mmol/L 24   Urea Nitrogen 8.0 - 23.0 mg/dL 19.4   Creatinine 0.51 - 0.95 mg/dL 0.57   GFR Estimate >60 mL/min/1.73m2 90   Calcium 8.8 - 10.2 mg/dL 8.7 (L)   Anion Gap 7 - 15 mmol/L 11   Albumin 3.5 - 5.2 g/dL 4.1   Protein Total 6.4 - 8.3 g/dL 6.1 (L)   Alkaline Phosphatase 35 - 104 U/L 106 (H)   ALT 0 - 50 U/L 13   AST 0 - 45 U/L 28   Bilirubin Total <=1.2 mg/dL 0.6   Glucose 70 - 99 mg/dL 85   Lactate Dehydrogenase 0 - 250 U/L 197   WBC 4.0 - 11.0 10e3/uL 5.2   Hemoglobin 11.7 - 15.7 g/dL 11.7   Hematocrit 35.0 - 47.0 % 33.7 (L)   Platelet Count 150 - 450 10e3/uL 129 (L)   RBC Count 3.80 - 5.20 10e6/uL 3.38 (L)   MCV 78 - 100 fL 100   MCH 26.5 - 33.0 pg 34.6 (H)   MCHC 31.5 - 36.5 g/dL 34.7   RDW 10.0 - 15.0 % 13.8   % Neutrophils % 43   %  Lymphocytes % 48   % Monocytes % 9   % Eosinophils % 0   % Basophils % 0   Absolute Basophils 0.0 - 0.2 10e3/uL 0.0   Absolute Eosinophils 0.0 - 0.7 10e3/uL 0.0   Absolute Immature Granulocytes <=0.4 10e3/uL 0.0   Absolute Lymphocytes 0.8 - 5.3 10e3/uL 2.4   Absolute Monocytes 0.0 - 1.3 10e3/uL 0.5   % Immature Granulocytes % 0   Absolute Neutrophils 1.6 - 8.3 10e3/uL 2.2   Absolute NRBCs 10e3/uL 0.0   NRBCs per 100 WBC <1 /100 0   % Retic 0.5 - 2.0 % 1.7   Absolute Retic 0.025 - 0.095 10e6/uL 0.059   (L): Data is abnormally low  (H): Data is abnormally high    Assessment and Plan:  # 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 Has had multiple relapses, most recently November 2021. She completed prolonged prednisone taper and Rituxan. She is off prednisone now. Has bactrim when she is on prednisone. She is now on Rixuan every 2 months with stable labs  - CBC reviewed, hgb WNL. Mild thrombocytopenia stable  - Continue with Rituxan today  - Continue with Rituxan every 2 months with provider visit   - CBC monitoring monthly     # CVID  Follows with immunology as well.   - Doing IVIG monthly when IGG level is <600.   - Today pending, will monitor for results and schedule infusion if needed   - Patient request will check CD4 count next visit   - Monthly lab monitoring      # EVI  Prior ferritin was low at 6 in April 2022. S/p Vneofer x 3.   - Iron studies WNL May 2023  - Will check iron studies again next visit     # Left Neck Cyst  No concerns for removal from our standpoint  - She plans to do removal this fall     30 minutes spent on the date of the encounter doing chart review, review of test results, interpretation of tests, patient visit, documentation, and discussion with other provider(s)     Isaiah Man PA-C  Department of Hematology and Oncology  [facility]

## 2023-08-21 ENCOUNTER — THERAPY VISIT (OUTPATIENT)
Dept: PHYSICAL THERAPY | Facility: CLINIC | Age: 83
End: 2023-08-21
Attending: STUDENT IN AN ORGANIZED HEALTH CARE EDUCATION/TRAINING PROGRAM
Payer: MEDICARE

## 2023-08-21 DIAGNOSIS — M62.81 GENERALIZED MUSCLE WEAKNESS: Primary | ICD-10-CM

## 2023-08-21 DIAGNOSIS — R53.83 OTHER FATIGUE: ICD-10-CM

## 2023-08-21 PROCEDURE — 97110 THERAPEUTIC EXERCISES: CPT | Mod: GP | Performed by: PHYSICAL THERAPIST

## 2023-08-21 PROCEDURE — 97140 MANUAL THERAPY 1/> REGIONS: CPT | Mod: GP | Performed by: PHYSICAL THERAPIST

## 2023-08-22 ENCOUNTER — INFUSION THERAPY VISIT (OUTPATIENT)
Dept: INFUSION THERAPY | Facility: CLINIC | Age: 83
End: 2023-08-22
Attending: PHYSICIAN ASSISTANT
Payer: MEDICARE

## 2023-08-22 ENCOUNTER — ONCOLOGY VISIT (OUTPATIENT)
Dept: ONCOLOGY | Facility: CLINIC | Age: 83
End: 2023-08-22
Attending: PHYSICIAN ASSISTANT
Payer: MEDICARE

## 2023-08-22 VITALS
HEART RATE: 77 BPM | HEIGHT: 72 IN | RESPIRATION RATE: 16 BRPM | WEIGHT: 145 LBS | DIASTOLIC BLOOD PRESSURE: 70 MMHG | OXYGEN SATURATION: 95 % | BODY MASS INDEX: 19.64 KG/M2 | SYSTOLIC BLOOD PRESSURE: 147 MMHG | TEMPERATURE: 98 F

## 2023-08-22 DIAGNOSIS — D83.9 CVID (COMMON VARIABLE IMMUNODEFICIENCY) (H): ICD-10-CM

## 2023-08-22 DIAGNOSIS — D59.10 AUTOIMMUNE HEMOLYTIC ANEMIA (H): Primary | ICD-10-CM

## 2023-08-22 DIAGNOSIS — D59.10 AUTOIMMUNE HEMOLYTIC ANEMIA (H): ICD-10-CM

## 2023-08-22 DIAGNOSIS — D59.19 OTHER AUTOIMMUNE HEMOLYTIC ANEMIA (H): Primary | ICD-10-CM

## 2023-08-22 LAB
ALBUMIN SERPL BCG-MCNC: 4.1 G/DL (ref 3.5–5.2)
ALP SERPL-CCNC: 106 U/L (ref 35–104)
ALT SERPL W P-5'-P-CCNC: 13 U/L (ref 0–50)
ANION GAP SERPL CALCULATED.3IONS-SCNC: 11 MMOL/L (ref 7–15)
AST SERPL W P-5'-P-CCNC: 28 U/L (ref 0–45)
BASOPHILS # BLD AUTO: 0 10E3/UL (ref 0–0.2)
BASOPHILS NFR BLD AUTO: 0 %
BILIRUB SERPL-MCNC: 0.6 MG/DL
BUN SERPL-MCNC: 19.4 MG/DL (ref 8–23)
CALCIUM SERPL-MCNC: 8.7 MG/DL (ref 8.8–10.2)
CHLORIDE SERPL-SCNC: 105 MMOL/L (ref 98–107)
CREAT SERPL-MCNC: 0.57 MG/DL (ref 0.51–0.95)
DEPRECATED HCO3 PLAS-SCNC: 24 MMOL/L (ref 22–29)
EOSINOPHIL # BLD AUTO: 0 10E3/UL (ref 0–0.7)
EOSINOPHIL NFR BLD AUTO: 0 %
ERYTHROCYTE [DISTWIDTH] IN BLOOD BY AUTOMATED COUNT: 13.8 % (ref 10–15)
GFR SERPL CREATININE-BSD FRML MDRD: 90 ML/MIN/1.73M2
GLUCOSE SERPL-MCNC: 85 MG/DL (ref 70–99)
HCT VFR BLD AUTO: 33.7 % (ref 35–47)
HGB BLD-MCNC: 11.7 G/DL (ref 11.7–15.7)
IMM GRANULOCYTES # BLD: 0 10E3/UL
IMM GRANULOCYTES NFR BLD: 0 %
LDH SERPL L TO P-CCNC: 197 U/L (ref 0–250)
LYMPHOCYTES # BLD AUTO: 2.4 10E3/UL (ref 0.8–5.3)
LYMPHOCYTES NFR BLD AUTO: 48 %
MCH RBC QN AUTO: 34.6 PG (ref 26.5–33)
MCHC RBC AUTO-ENTMCNC: 34.7 G/DL (ref 31.5–36.5)
MCV RBC AUTO: 100 FL (ref 78–100)
MONOCYTES # BLD AUTO: 0.5 10E3/UL (ref 0–1.3)
MONOCYTES NFR BLD AUTO: 9 %
NEUTROPHILS # BLD AUTO: 2.2 10E3/UL (ref 1.6–8.3)
NEUTROPHILS NFR BLD AUTO: 43 %
NRBC # BLD AUTO: 0 10E3/UL
NRBC BLD AUTO-RTO: 0 /100
PLATELET # BLD AUTO: 129 10E3/UL (ref 150–450)
POTASSIUM SERPL-SCNC: 4.1 MMOL/L (ref 3.4–5.3)
PROT SERPL-MCNC: 6.1 G/DL (ref 6.4–8.3)
RBC # BLD AUTO: 3.38 10E6/UL (ref 3.8–5.2)
RETICS # AUTO: 0.06 10E6/UL (ref 0.03–0.1)
RETICS/RBC NFR AUTO: 1.7 % (ref 0.5–2)
SODIUM SERPL-SCNC: 140 MMOL/L (ref 136–145)
WBC # BLD AUTO: 5.2 10E3/UL (ref 4–11)

## 2023-08-22 PROCEDURE — 85045 AUTOMATED RETICULOCYTE COUNT: CPT | Performed by: INTERNAL MEDICINE

## 2023-08-22 PROCEDURE — 96413 CHEMO IV INFUSION 1 HR: CPT

## 2023-08-22 PROCEDURE — 82784 ASSAY IGA/IGD/IGG/IGM EACH: CPT | Performed by: INTERNAL MEDICINE

## 2023-08-22 PROCEDURE — 82310 ASSAY OF CALCIUM: CPT | Performed by: INTERNAL MEDICINE

## 2023-08-22 PROCEDURE — 258N000003 HC RX IP 258 OP 636: Performed by: INTERNAL MEDICINE

## 2023-08-22 PROCEDURE — 85025 COMPLETE CBC W/AUTO DIFF WBC: CPT | Performed by: INTERNAL MEDICINE

## 2023-08-22 PROCEDURE — 250N000013 HC RX MED GY IP 250 OP 250 PS 637: Performed by: INTERNAL MEDICINE

## 2023-08-22 PROCEDURE — 99214 OFFICE O/P EST MOD 30 MIN: CPT | Performed by: PHYSICIAN ASSISTANT

## 2023-08-22 PROCEDURE — 36415 COLL VENOUS BLD VENIPUNCTURE: CPT

## 2023-08-22 PROCEDURE — 83010 ASSAY OF HAPTOGLOBIN QUANT: CPT | Performed by: INTERNAL MEDICINE

## 2023-08-22 PROCEDURE — 250N000011 HC RX IP 250 OP 636: Mod: JZ | Performed by: INTERNAL MEDICINE

## 2023-08-22 PROCEDURE — 83615 LACTATE (LD) (LDH) ENZYME: CPT | Performed by: INTERNAL MEDICINE

## 2023-08-22 PROCEDURE — G0463 HOSPITAL OUTPT CLINIC VISIT: HCPCS | Mod: 25 | Performed by: PHYSICIAN ASSISTANT

## 2023-08-22 RX ORDER — ACETAMINOPHEN 325 MG/1
650 TABLET ORAL ONCE
Status: COMPLETED | OUTPATIENT
Start: 2023-08-22 | End: 2023-08-22

## 2023-08-22 RX ORDER — DIPHENHYDRAMINE HCL 25 MG
50 CAPSULE ORAL ONCE
Status: COMPLETED | OUTPATIENT
Start: 2023-08-22 | End: 2023-08-22

## 2023-08-22 RX ADMIN — RITUXIMAB-ABBS 700 MG: 10 INJECTION, SOLUTION INTRAVENOUS at 14:49

## 2023-08-22 RX ADMIN — DIPHENHYDRAMINE HYDROCHLORIDE 25 MG: 25 CAPSULE ORAL at 14:06

## 2023-08-22 RX ADMIN — ACETAMINOPHEN 650 MG: 325 TABLET ORAL at 14:06

## 2023-08-22 RX ADMIN — SODIUM CHLORIDE 250 ML: 9 INJECTION, SOLUTION INTRAVENOUS at 14:47

## 2023-08-22 ASSESSMENT — PAIN SCALES - GENERAL: PAINLEVEL: NO PAIN (0)

## 2023-08-22 NOTE — NURSING NOTE
Oncology Rooming Note    August 22, 2023 1:12 PM   Tricia Sosa is a 82 year old female who presents for:    Chief Complaint   Patient presents with    Oncology Clinic Visit     Initial Vitals: BP (!) 147/70   Pulse 77   Temp 98  F (36.7  C) (Tympanic)   Resp 16   Ht 1.829 m (6')   Wt 65.8 kg (145 lb)   LMP  (LMP Unknown)   SpO2 95%   BMI 19.67 kg/m   Estimated body mass index is 19.67 kg/m  as calculated from the following:    Height as of this encounter: 1.829 m (6').    Weight as of this encounter: 65.8 kg (145 lb). Body surface area is 1.83 meters squared.  No Pain (0) Comment: Data Unavailable   No LMP recorded (lmp unknown). Patient is postmenopausal.  Allergies reviewed: Yes  Medications reviewed: Yes    Medications: Medication refills not needed today.  Pharmacy name entered into [employer]Me[employer]:    [place], [place] - [address] [id number] [place], [place] - [address] [facility]    Clinical concerns: f/u       Corrie Hurt, MOHSEN

## 2023-08-22 NOTE — PROGRESS NOTES
Infusion Nursing Note:  Tricia Sosa presents today for C8D1 Truxima.    Patient seen by provider today: Yes: CHELSEA Marlow   present during visit today: Not Applicable.    Note: Patient requested to take 25 mg Benadryl as pre-med instead of the ordered dose of 50 mg.      Intravenous Access:  Peripheral IV placed in [employer].    Treatment Conditions:  Not Applicable.      Post Infusion Assessment:  Patient tolerated infusion without incident.  Blood return noted pre and post infusion.  Site patent and intact, free from redness, edema or discomfort.  No evidence of extravasations.  Access discontinued per protocol.       Discharge Plan:   AVS to patient via MYCHART.     Patient discharged in stable condition accompanied by: self.  Departure Mode: Ambulatory.      Pari Burk RN

## 2023-08-22 NOTE — LETTER
8/22/2023         RE: Tricia Sosa  [id number]  [facility] [id number]        Dear Colleague,    Thank you for referring your patient, Tricia Sosa, to the [facility] [place]. Please see a copy of my visit note below.    Oncology/Hematology Visit Note  Aug 22, 2023    Reason for Visit: Follow up of CVID, autoimmune hemolytic anemia, iron deficiency      HISTORY OF PRESENT ILLNESS: Tricia Sosa is a 82 year old female who presents with autoimmune hemolytic anemia and IgA deficiency.  She previously saw Dr. Matos and then Dr. Summers.  She was previously seen at [facility].     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  She was very sick at the time and had severe diarrhea.  She lost about 40 pounds in weight.  The workup revealed that she had markedly diminished CD4 counts of 48 and was diagnosed with CMV colitis.  Since then she has been followed in hematology clinic at [place] until recently.  She was noted to have anemia which was fairly long-standing.  She was initially referred for anemia in 2004 and also had a bone marrow biopsy done at that time which revealed hypercellular bone marrow with 70-80% cellularity and normal trilineage hematopoiesis and no morphologic features of MDS or lymphoproliferation.  Her anemia was attributed to her chronic underlying disease.  At the next evaluation in 2002 on 4 aggressive anemia there was no evidence of hemolysis, iron deficiency, B12 or folate deficiency.  Bone marrow biopsy was not pursued.  In summer of 2015 she had progressive fatigue, dyspnea on exertion and worsening anemia with a hemoglobin now of 9.  Workup at this time did suggest a hemolytic anemia with elevated LDH at 709, undetectable haptoglobin and elevated unconjugated bilirubin at 3.3.  Monospecific MARIKA was positive for both IgG and complement.  Cold agglutinin screen was positive with very low titer 1:64.  She was started  on prednisone 60 mg daily with supplementation of iron folate and B12 starting 7/31/15.  Her prednisone has been slowly tapered and was discontinued off in January 2016.  She was last followed at [facility] on March 10, 2016 when she had stable hemoglobin.     She was followed by Dr. Matos and Dr. Summers.  Due to relapse of hemolytic anemia, she underwent another course of prednisone that has since been tapered off and rituximab x 4 weekly doses completed in 9/19/19.     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  She started weekly Rituxan on 7/31/20 x 4 doses, completed on 8/21/20.  She tapered off of prednisone in October 2020.     Due to relapse of her AIHA, she started prednisone again on 6/8/21 at 60 mg daily with slow taper and finished taper in August 2021.     Due to relapse of AIHA again, she started prednisone again on 11/2/21 at 70 mg daily with slow taper.  She also completed weekly Rituxan again x 4 doses 12/6/21-12/29/21.     Venofer 5/5-5/19/22.     She is now on Rituxan every 2 months and IVIG monthly if IgG <600.    Interval History:  Tricia is overall doing well. Working with PT to build strength. Sinus infection resolved. Denies any new concerns.     Review of Systems:  Patient denies fevers, chills, night sweats, unexplained weight changes, headaches, dizziness, vision or hearing changes, new lumps or bumps, chest pain, shortness of breath, cough, abdominal pain, nausea, vomiting, changes to bowel or bladder, swelling of extremities, bleeding issues, or rash.    Current Outpatient Medications   Medication Sig Dispense Refill     cefdinir (OMNICEF) 300 MG capsule Take 300 mg by mouth 2 times daily       cyanocobalamin (VITAMIN  B-12) 1000 MCG tablet   3     folic acid (FOLVITE) 1 MG tablet   3     ketoconazole (NIZORAL) 2 % external shampoo Use every other day to scalp as needed 120 mL 0     levothyroxine (SYNTHROID/LEVOTHROID) 150 MCG  tablet Take one tablet  by mouth daily 90 tablet 2     triamcinolone (KENALOG) 0.1 % external ointment Apply topically 2 times daily       UNKNOWN TO PATIENT Prednisone taper for total of 16 days per Dr. 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, and posterior vitreous detachment     Urinary tract infection, site not specified     Recurrent UTI's     Past Surgical History:   Procedure Laterality Date     COLONOSCOPY N/A 4/26/2016    Procedure: COLONOSCOPY;  Surgeon: Dontae Del Rio MD;  Location:  GI     ENT SURGERY  1985    Thyroid lobectomy     EYE SURGERY Bilateral 04/20/2021    Cataract Surgery     HC CYSTOSCOPY,INSERT URETERAL STENT      Ureteral stent insertion     HC FLEX SIGMOIDOSCOPY W BIOPSY  5/30/00      LAPAROSCOPY, SURGICAL; CHOLECYSTECTOMY  1992      THYROIDECTOMY       LIGATION OF HEMORRHOID(S)      Hemorrhoidectomy     UPPER GI ENDOSCOPY,BIOPSY  10/01/01     ZZC LIGATE FALLOPIAN TUBE       ZZ COLONOSCOPY W BIOPSY  4/26/00     ZZHC COLONOSCOPY W BIOPSY  10/8/03     ZZHC COLONOSCOPY W BIOPSY  6/27/05    REPEAT IN 5 YEARS     Allergies   Allergen Reactions     Doxycycline      Social History   Social History     Tobacco Use     Smoking status: Never     Smokeless tobacco: Never   Vaping Use     Vaping Use: Never used   Substance Use Topics     Alcohol use: Yes     Alcohol/week: 0.0 standard  drinks of alcohol     Comment: 1 a day at most     Drug use: No      Past medical history and social history were reviewed.    Physical Examination:  BP (!) 147/70 (Cuff Size: Adult Regular)   Pulse 77   Temp 98  F (36.7  C) (Tympanic)   Resp 16   Ht 1.829 m (6')   Wt 65.8 kg (145 lb)   LMP  (LMP Unknown)   SpO2 95%   BMI 19.67 kg/m    Wt Readings from Last 10 Encounters:   08/04/23 65.3 kg (144 lb)   06/27/23 64.9 kg (143 lb)   06/09/23 65.8 kg (145 lb 1.6 oz)   05/02/23 67.9 kg (149 lb 9.6 oz)   04/18/23 68.2 kg (150 lb 6.4 oz)   04/14/23 68.9 kg (151 lb 12.8 oz)   03/07/23 68.5 kg (151 lb)   02/10/23 69.1 kg (152 lb 4.8 oz)   01/06/23 67.9 kg (149 lb 12.8 oz)   12/09/22 67.3 kg (148 lb 4.8 oz)     Constitutional: Well-appearing female in no acute distress.  Eyes: EOMI, PERRL. No scleral icterus.  ENT: Oral mucosa is moist without lesions or thrush.   Lymphatic: Neck is supple without cervical or supraclavicular lymphadenopathy. Cyst on L neck.  Cardiovascular: Regular rate and rhythm. No murmurs, gallops, or rubs. No peripheral edema.  Respiratory: Clear to auscultation bilaterally. No wheezes or crackles.  Gastrointestinal: Bowel sounds present. Abdomen soft, non-tender.   Neurologic: Cranial nerves II through XII are grossly intact.  Skin: Few small bruises on arms.     Laboratory Data:   Latest Reference Range & Units 08/22/23 12:57   Sodium 136 - 145 mmol/L 140   Potassium 3.4 - 5.3 mmol/L 4.1   Chloride 98 - 107 mmol/L 105   Carbon Dioxide (CO2) 22 - 29 mmol/L 24   Urea Nitrogen 8.0 - 23.0 mg/dL 19.4   Creatinine 0.51 - 0.95 mg/dL 0.57   GFR Estimate >60 mL/min/1.73m2 90   Calcium 8.8 - 10.2 mg/dL 8.7 (L)   Anion Gap 7 - 15 mmol/L 11   Albumin 3.5 - 5.2 g/dL 4.1   Protein Total 6.4 - 8.3 g/dL 6.1 (L)   Alkaline Phosphatase 35 - 104 U/L 106 (H)   ALT 0 - 50 U/L 13   AST 0 - 45 U/L 28   Bilirubin Total <=1.2 mg/dL 0.6   Glucose 70 - 99 mg/dL 85   Lactate Dehydrogenase 0 - 250 U/L 197   WBC 4.0 - 11.0  10e3/uL 5.2   Hemoglobin 11.7 - 15.7 g/dL 11.7   Hematocrit 35.0 - 47.0 % 33.7 (L)   Platelet Count 150 - 450 10e3/uL 129 (L)   RBC Count 3.80 - 5.20 10e6/uL 3.38 (L)   MCV 78 - 100 fL 100   MCH 26.5 - 33.0 pg 34.6 (H)   MCHC 31.5 - 36.5 g/dL 34.7   RDW 10.0 - 15.0 % 13.8   % Neutrophils % 43   % Lymphocytes % 48   % Monocytes % 9   % Eosinophils % 0   % Basophils % 0   Absolute Basophils 0.0 - 0.2 10e3/uL 0.0   Absolute Eosinophils 0.0 - 0.7 10e3/uL 0.0   Absolute Immature Granulocytes <=0.4 10e3/uL 0.0   Absolute Lymphocytes 0.8 - 5.3 10e3/uL 2.4   Absolute Monocytes 0.0 - 1.3 10e3/uL 0.5   % Immature Granulocytes % 0   Absolute Neutrophils 1.6 - 8.3 10e3/uL 2.2   Absolute NRBCs 10e3/uL 0.0   NRBCs per 100 WBC <1 /100 0   % Retic 0.5 - 2.0 % 1.7   Absolute Retic 0.025 - 0.095 10e6/uL 0.059   (L): Data is abnormally low  (H): Data is abnormally high    Assessment and Plan:  # Warm Autoimmune Hemolytic Anemia  (positive MARIKA to IgG and complement). Has had multiple relapses, most recently November 2021. She completed prolonged prednisone taper and Rituxan. She is off prednisone now. Has bactrim when she is on prednisone. She is now on Rixuan every 2 months with stable labs  - CBC reviewed, hgb WNL. Mild thrombocytopenia stable  - Continue with Rituxan today  - Continue with Rituxan every 2 months with provider visit   - CBC monitoring monthly     # CVID  Follows with immunology as well.   - Doing IVIG monthly when IGG level is <600.   - Today pending, will monitor for results and schedule infusion if needed   - Patient request will check CD4 count next visit   - Monthly lab monitoring      # EVI  Prior ferritin was low at 6 in April 2022. S/p Vneofer x 3.   - Iron studies WNL May 2023  - Will check iron studies again next visit     # Left Neck Cyst  No concerns for removal from our standpoint  - She plans to do removal this fall     30 minutes spent on the date of the encounter doing chart review, review of test  results, interpretation of tests, patient visit, documentation, and discussion with other provider(s)     Isaiah Man PA-C  Department of Hematology and Oncology  [facility]       Again, thank you for allowing me to participate in the care of your patient.        Sincerely,        CHELSEA Marlow

## 2023-08-22 NOTE — PROGRESS NOTES
Infusion Nursing Note:  Tricia Sosa presents today for PIV labs.     present during visit today: Not Applicable.    Note: N/A.    Intravenous Access:  Labs drawn without difficulty.  Peripheral IV placed.    Treatment Conditions:  NA    Post Lab Assessment:  Patient tolerated blood collection   Site patent and intact, free from redness, edema or discomfort.  No evidence of extravasations.  Access (remains for infusion use today)     Discharge Plan:   Patient and/or family verbalized understanding of  instructions and all questions answered.  Patient  to lobby in stable condition accompanied by: self.  Patient to see provider today: Yes: CHELSEA Colon  Departure Mode: Ambulatory.  Serina Otto RN

## 2023-08-23 LAB
HAPTOGLOB SERPL-MCNC: 3 MG/DL (ref 32–197)
IGA SERPL-MCNC: <2 MG/DL (ref 84–499)
IGG SERPL-MCNC: 664 MG/DL (ref 610–1616)
IGM SERPL-MCNC: 14 MG/DL (ref 35–242)

## 2023-09-07 ENCOUNTER — THERAPY VISIT (OUTPATIENT)
Dept: PHYSICAL THERAPY | Facility: CLINIC | Age: 83
End: 2023-09-07
Attending: STUDENT IN AN ORGANIZED HEALTH CARE EDUCATION/TRAINING PROGRAM
Payer: MEDICARE

## 2023-09-07 DIAGNOSIS — M62.81 GENERALIZED MUSCLE WEAKNESS: Primary | ICD-10-CM

## 2023-09-07 PROCEDURE — 97116 GAIT TRAINING THERAPY: CPT | Mod: GP | Performed by: PHYSICAL THERAPIST

## 2023-09-07 PROCEDURE — 97140 MANUAL THERAPY 1/> REGIONS: CPT | Mod: GP | Performed by: PHYSICAL THERAPIST

## 2023-09-07 PROCEDURE — 97110 THERAPEUTIC EXERCISES: CPT | Mod: GP | Performed by: PHYSICAL THERAPIST

## 2023-09-18 ENCOUNTER — THERAPY VISIT (OUTPATIENT)
Dept: PHYSICAL THERAPY | Facility: CLINIC | Age: 83
End: 2023-09-18
Attending: STUDENT IN AN ORGANIZED HEALTH CARE EDUCATION/TRAINING PROGRAM
Payer: MEDICARE

## 2023-09-18 DIAGNOSIS — M62.81 GENERALIZED MUSCLE WEAKNESS: Primary | ICD-10-CM

## 2023-09-18 DIAGNOSIS — D59.10 AUTOIMMUNE HEMOLYTIC ANEMIA (H): ICD-10-CM

## 2023-09-18 DIAGNOSIS — R53.83 OTHER FATIGUE: ICD-10-CM

## 2023-09-18 PROCEDURE — 97110 THERAPEUTIC EXERCISES: CPT | Mod: GP | Performed by: PHYSICAL THERAPIST

## 2023-09-18 PROCEDURE — 97750 PHYSICAL PERFORMANCE TEST: CPT | Mod: GP | Performed by: PHYSICAL THERAPIST

## 2023-09-18 PROCEDURE — 94618 PULMONARY STRESS TESTING: CPT | Performed by: PHYSICAL THERAPIST

## 2023-09-18 NOTE — PROGRESS NOTES
09/18/23 0500   Appointment Info   Signing clinician's name / credentials Katya Betancourt PT   Total/Authorized Visits 7   Medical Diagnosis generalized muscle weakness, other fatigue, autoimmune hemolytic anemia   PT Tx Diagnosis decreased force production muscles and decreased endurance , gait and balance impairments   Precautions/Limitations falls   Quick Adds Certification   Progress Note/Certification   Start of Care Date 06/13/23   Onset of illness/injury or Date of Surgery 05/16/23  (date of order)   Therapy Frequency 1 x a week every other week   Predicted Duration in 90 days   Certification date from 09/13/23   Certification date to 12/11/23   GOALS   PT Goals 2;3;4;5;6   PT Goal 1   Goal Identifier HEP   Goal Description Tricia to be independent in an appropriate HEP to address her impairments safely and improve her overall function and well being.   Rationale to maximize safety and independence with performance of ADLs and functional tasks;to maximize safety and independence within the home;to maximize safety and independence within the community;to maximize safety and independence with transportation;to maximize safety and independence with self cares   Goal Progress 9/18/23 reports doing her HEP but not everyday.  Sometimes combines exercises together.   Target Date 09/11/23   PT Goal 2   Goal Identifier balance   Goal Description Tricia to  rhomberg with EC on regular surface x 20 sec and on conforming surface/foam x 12 sec or greater to demonstate improved balance for all activities.   Rationale to maximize safety and independence with performance of ADLs and functional tasks;to maximize safety and independence within the home;to maximize safety and independence within the community;to maximize safety and independence with transportation;to maximize safety and independence with self cares   Goal Progress eval 6/13/23 on regular kaylee 5 sec, on foam 1 sec  9/18/23 on regular kaylee EC 11 sec,  on foam 1.5 sec.   Target Date 09/11/23   PT Goal 3   Goal Identifier FGA   Goal Description Tricia to increase her FGA score from 14/30 at eval to 22 or greater to demonstrate improved functional balance and strength for decreased fall risk and improved function in home and community.   Rationale to maximize safety and independence with performance of ADLs and functional tasks;to maximize safety and independence within the home;to maximize safety and independence within the community;to maximize safety and independence with transportation;to maximize safety and independence with self cares   Goal Progress 9/18/23   Target Date 09/11/23   PT Goal 4   Goal Identifier functional endurance   Goal Description Tricia to improve her 6MWT distance by 75 meters or greater to demonstrate improved functional endurance to allow for better tolerance of home activities, community mobility.   Rationale to maximize safety and independence with performance of ADLs and functional tasks;to maximize safety and independence within the home;to maximize safety and independence within the community;to maximize safety and independence with transportation;to maximize safety and independence with self cares   Goal Progress 7/12/23 performed with SBA , without AD, occasionally touching wall, trendelenburg L, total distance 616 ft, OMNI 4-6, HR at start 72 bpm after 80 bpm, sats 96% before and 97% after.  9/18/23   sats 98%, HR 78 bpm, BP automatic L /77  touch of wall x 1, distance 564 feet no device, SBA. After sats 97%, HR 76 bpm, /68  OMNI moderate.   Target Date 09/11/23   PT Goal 5   Goal Identifier hip abduction in sidelying   Goal Description Tricia to increase her hip abduction to 4-5/5 B to allow for improved balance, functional strength, gait pattern and overall safety.   Rationale to maximize safety and independence with performance of ADLs and functional tasks;to maximize safety and independence within the home;to  maximize safety and independence within the community;to maximize safety and independence with transportation;to maximize safety and independence with self cares   Goal Progress 4/5 L , R 4+/5 on 9/7/23   Target Date 09/11/23   PT Goal 6   Goal Identifier LE functional strength   Goal Description Tricia to perform sit to stand to 18 inch chair with controlled descent for greater safety with transfers, decreased fall risk and improved overall mobility   Rationale to maximize safety and independence with performance of ADLs and functional tasks;to maximize safety and independence within the home;to maximize safety and independence within the community;to maximize safety and independence with transportation;to maximize safety and independence with self cares   Goal Progress eval - uncontrolled descent in to 18 inch chair. 9/18/23 continues to need assist from UE to control descent.   Target Date 09/11/23     FGA SCORE increased by 1.   Tricia has demonstrated progression to goals.  6MWT distance slightly less but more upright posture and more stable with gait.  She continues to have core and proximal weakness as well as general weakness affecting her balance and gait.  She is appropriate to continue with PT to further address her impairments.  She would prefer to attend PT every other week rather than once a week due to having multiple medical appointments.  Rec speaking to a nutritionist to assist in muscle growth, strength. She states she had an appointment set up in the past but canceled as wasn't sure it would be covered by insurance.  States she has been told in the past to do ensure or boost.     [facility]                                                                                   OUTPATIENT PHYSICAL THERAPY    PLAN OF TREATMENT FOR OUTPATIENT REHABILITATION   Patient's Last Name, First Name, ELVIS SosaMaandrew PACHECO YOB: 1940   Provider's Name   [facility]   Medical Record No.  [id number]     Onset Date: 05/16/23 (date of order)  Start of Care Date: 06/13/23     Medical Diagnosis:  generalized muscle weakness, other fatigue, autoimmune hemolytic anemia      PT Treatment Diagnosis:  decreased force production muscles and decreased endurance , gait and balance impairments Plan of Treatment  Frequency/Duration: (P) 1 x a week every other week/ in 90 days    Certification date from (P) 09/13/23 to (P) 12/11/23         See note for plan of treatment details and functional goals     Katya Betancourt, PT                         I CERTIFY THE NEED FOR THESE SERVICES FURNISHED UNDER        THIS PLAN OF TREATMENT AND WHILE UNDER MY CARE     (Physician attestation of this document indicates review and certification of the therapy plan).                Referring Provider:  Marley Heart      Initial Assessment  See Epic Evaluation- Start of Care Date: 06/13/23    PLAN  Continue therapy per current plan of care.    Beginning/End Dates of Progress Note Reporting Period:   6/13/23 to 09/18/2023    Referring Provider:  Marley Heart

## 2023-09-18 NOTE — PROGRESS NOTES
09/18/23 1200   Signing Clinician's Name / Credentials   Signing clinician's name / credentials Katya Betancourt PT   Functional Gait Assessment (ALESSANDRO Fine, LG Josue FKeon, et al. (2004))   1. GAIT LEVEL SURFACE 1  (11.28 sec)   2. CHANGE IN GAIT SPEED 2   3. GAIT WITH HORIZONTAL HEAD TURNS 2   4. GAIT WITH VERTICAL HEAD TURNS 2   5. GAIT AND PIVOT TURN 2   6. STEP OVER OBSTACLE 2   7. GAIT WITH NARROW BASE OF SUPPORT 0   8. GAIT WITH EYES CLOSED 0  (not able to complete)   9. AMBULATING BACKWARDS 2  (32.93 sec)   10. STEPS 2   Total Functional Gait Assessment Score   TOTAL SCORE: (MAXIMUM SCORE 30) 15     Functional Gait Assessment (FGA): The FGA assesses postural stability during various walking tasks.   Gait assistive device used: None    Scores of <22 /30 have been correlated with predicting falls in community-dwelling older adults according to Babatunde & Christiano 2010.   Scores of <18 /30 have been correlated with increased risk for falls in patients with Parkinsons Disease according to Hu Abraham Zhou et al 2014.  Minimal Detectable Change for patients with acute/chronic stroke = 4.2 according to Jesus & Jesseschnivia 2009  Minimal Detectable Change for patients with vestibular disorder = 8 according to Babatunde & Christiano 2010    Assessment (rationale for performing, application to patient s function & care plan): progress  (Minutes billed as physical performance test):  16 minutes

## 2023-09-27 ENCOUNTER — LAB (OUTPATIENT)
Dept: ONCOLOGY | Facility: CLINIC | Age: 83
End: 2023-09-27
Attending: INTERNAL MEDICINE
Payer: MEDICARE

## 2023-09-27 ENCOUNTER — TRANSCRIBE ORDERS (OUTPATIENT)
Dept: OTHER | Age: 83
End: 2023-09-27

## 2023-09-27 DIAGNOSIS — D59.10 AUTOIMMUNE HEMOLYTIC ANEMIA (H): ICD-10-CM

## 2023-09-27 DIAGNOSIS — D83.9 CVID (COMMON VARIABLE IMMUNODEFICIENCY) (H): ICD-10-CM

## 2023-09-27 LAB
ALBUMIN SERPL BCG-MCNC: 4.3 G/DL (ref 3.5–5.2)
ALP SERPL-CCNC: 101 U/L (ref 35–104)
ALT SERPL W P-5'-P-CCNC: 14 U/L (ref 0–50)
ANION GAP SERPL CALCULATED.3IONS-SCNC: 7 MMOL/L (ref 7–15)
AST SERPL W P-5'-P-CCNC: 26 U/L (ref 0–45)
BASOPHILS # BLD AUTO: 0 10E3/UL (ref 0–0.2)
BASOPHILS NFR BLD AUTO: 0 %
BILIRUB SERPL-MCNC: 0.5 MG/DL
BUN SERPL-MCNC: 18.4 MG/DL (ref 8–23)
CALCIUM SERPL-MCNC: 8.6 MG/DL (ref 8.8–10.2)
CHLORIDE SERPL-SCNC: 105 MMOL/L (ref 98–107)
CREAT SERPL-MCNC: 0.61 MG/DL (ref 0.51–0.95)
DEPRECATED HCO3 PLAS-SCNC: 29 MMOL/L (ref 22–29)
EGFRCR SERPLBLD CKD-EPI 2021: 89 ML/MIN/1.73M2
EOSINOPHIL # BLD AUTO: 0 10E3/UL (ref 0–0.7)
EOSINOPHIL NFR BLD AUTO: 0 %
ERYTHROCYTE [DISTWIDTH] IN BLOOD BY AUTOMATED COUNT: 13.4 % (ref 10–15)
FERRITIN SERPL-MCNC: 28 NG/ML (ref 11–328)
GLUCOSE SERPL-MCNC: 86 MG/DL (ref 70–99)
HCT VFR BLD AUTO: 34.8 % (ref 35–47)
HGB BLD-MCNC: 11.9 G/DL (ref 11.7–15.7)
IMM GRANULOCYTES # BLD: 0 10E3/UL
IMM GRANULOCYTES NFR BLD: 0 %
IRON BINDING CAPACITY (ROCHE): 338 UG/DL (ref 240–430)
IRON SATN MFR SERPL: 21 % (ref 15–46)
IRON SERPL-MCNC: 72 UG/DL (ref 37–145)
LDH SERPL L TO P-CCNC: 192 U/L (ref 0–250)
LYMPHOCYTES # BLD AUTO: 2.4 10E3/UL (ref 0.8–5.3)
LYMPHOCYTES NFR BLD AUTO: 42 %
MCH RBC QN AUTO: 34.7 PG (ref 26.5–33)
MCHC RBC AUTO-ENTMCNC: 34.2 G/DL (ref 31.5–36.5)
MCV RBC AUTO: 102 FL (ref 78–100)
MONOCYTES # BLD AUTO: 0.6 10E3/UL (ref 0–1.3)
MONOCYTES NFR BLD AUTO: 10 %
NEUTROPHILS # BLD AUTO: 2.6 10E3/UL (ref 1.6–8.3)
NEUTROPHILS NFR BLD AUTO: 48 %
NRBC # BLD AUTO: 0 10E3/UL
NRBC BLD AUTO-RTO: 0 /100
PLATELET # BLD AUTO: 140 10E3/UL (ref 150–450)
POTASSIUM SERPL-SCNC: 4.5 MMOL/L (ref 3.4–5.3)
PROT SERPL-MCNC: 5.5 G/DL (ref 6.4–8.3)
RBC # BLD AUTO: 3.43 10E6/UL (ref 3.8–5.2)
RETICS # AUTO: 0.06 10E6/UL (ref 0.03–0.1)
RETICS/RBC NFR AUTO: 1.7 % (ref 0.5–2)
SODIUM SERPL-SCNC: 141 MMOL/L (ref 135–145)
WBC # BLD AUTO: 5.6 10E3/UL (ref 4–11)

## 2023-09-27 PROCEDURE — 85045 AUTOMATED RETICULOCYTE COUNT: CPT | Performed by: INTERNAL MEDICINE

## 2023-09-27 PROCEDURE — 82784 ASSAY IGA/IGD/IGG/IGM EACH: CPT | Performed by: INTERNAL MEDICINE

## 2023-09-27 PROCEDURE — 36415 COLL VENOUS BLD VENIPUNCTURE: CPT

## 2023-09-27 PROCEDURE — 80053 COMPREHEN METABOLIC PANEL: CPT | Performed by: INTERNAL MEDICINE

## 2023-09-27 PROCEDURE — 83615 LACTATE (LD) (LDH) ENZYME: CPT | Performed by: INTERNAL MEDICINE

## 2023-09-27 PROCEDURE — 82728 ASSAY OF FERRITIN: CPT | Performed by: PHYSICIAN ASSISTANT

## 2023-09-27 PROCEDURE — 86360 T CELL ABSOLUTE COUNT/RATIO: CPT | Performed by: PHYSICIAN ASSISTANT

## 2023-09-27 PROCEDURE — 85025 COMPLETE CBC W/AUTO DIFF WBC: CPT | Performed by: INTERNAL MEDICINE

## 2023-09-27 PROCEDURE — 83550 IRON BINDING TEST: CPT | Performed by: PHYSICIAN ASSISTANT

## 2023-09-27 PROCEDURE — 83010 ASSAY OF HAPTOGLOBIN QUANT: CPT | Performed by: INTERNAL MEDICINE

## 2023-09-27 NOTE — PROGRESS NOTES
Medical Assistant Note:  Tricia Sosa presents today for blood draw.    Patient seen by provider today: No.   present during visit today: Not Applicable.    Concerns: No Concerns.    Procedure:  Lab draw site: left antecub and right antecub, Needle type: butterfly, Gauge: 23.    Post Assessment:  Labs drawn without difficulty: Yes.    Discharge Plan:  Departure Mode: Ambulatory.    Face to Face Time: 10.    Barbara Corrigan, CMA

## 2023-09-28 LAB
CD3 CELLS # BLD: 2091 CELLS/UL (ref 603–2990)
CD3 CELLS NFR BLD: 98 % (ref 49–84)
CD3+CD4+ CELLS # BLD: 392 CELLS/UL (ref 441–2156)
CD3+CD4+ CELLS NFR BLD: 18 % (ref 28–63)
CD3+CD4+ CELLS/CD3+CD8+ CLL BLD: 0.24 % (ref 1.4–2.6)
CD3+CD8+ CELLS # BLD: 1643 CELLS/UL (ref 125–1312)
CD3+CD8+ CELLS NFR BLD: 77 % (ref 10–40)
HAPTOGLOB SERPL-MCNC: <3 MG/DL (ref 32–197)
IGA SERPL-MCNC: <2 MG/DL (ref 84–499)
IGG SERPL-MCNC: 414 MG/DL (ref 610–1616)
IGM SERPL-MCNC: 13 MG/DL (ref 35–242)
T CELL COMMENT: ABNORMAL

## 2023-09-29 ENCOUNTER — INFUSION THERAPY VISIT (OUTPATIENT)
Dept: INFUSION THERAPY | Facility: CLINIC | Age: 83
End: 2023-09-29
Attending: INTERNAL MEDICINE
Payer: MEDICARE

## 2023-09-29 ENCOUNTER — DOCUMENTATION ONLY (OUTPATIENT)
Dept: PHARMACY | Facility: CLINIC | Age: 83
End: 2023-09-29
Payer: MEDICARE

## 2023-09-29 VITALS
OXYGEN SATURATION: 96 % | WEIGHT: 145.9 LBS | HEART RATE: 86 BPM | TEMPERATURE: 97.1 F | BODY MASS INDEX: 19.79 KG/M2 | SYSTOLIC BLOOD PRESSURE: 139 MMHG | DIASTOLIC BLOOD PRESSURE: 74 MMHG

## 2023-09-29 DIAGNOSIS — D72.818 LOW CD4 CELL COUNT DETERMINED BY FLOW CYTOMETRY: Primary | ICD-10-CM

## 2023-09-29 DIAGNOSIS — D83.9 CVID (COMMON VARIABLE IMMUNODEFICIENCY) (H): ICD-10-CM

## 2023-09-29 DIAGNOSIS — D59.10 AUTOIMMUNE HEMOLYTIC ANEMIA (H): ICD-10-CM

## 2023-09-29 DIAGNOSIS — D83.9 CVID (COMMON VARIABLE IMMUNODEFICIENCY) (H): Primary | ICD-10-CM

## 2023-09-29 DIAGNOSIS — D80.3 SELECTIVE DEFICIENCY OF IGG SUBCLASSES (H): ICD-10-CM

## 2023-09-29 DIAGNOSIS — D59.19 OTHER AUTOIMMUNE HEMOLYTIC ANEMIA (H): ICD-10-CM

## 2023-09-29 PROCEDURE — 96366 THER/PROPH/DIAG IV INF ADDON: CPT

## 2023-09-29 PROCEDURE — 250N000011 HC RX IP 250 OP 636: Performed by: INTERNAL MEDICINE

## 2023-09-29 PROCEDURE — 96365 THER/PROPH/DIAG IV INF INIT: CPT

## 2023-09-29 PROCEDURE — 96375 TX/PRO/DX INJ NEW DRUG ADDON: CPT

## 2023-09-29 RX ORDER — METHYLPREDNISOLONE SODIUM SUCCINATE 40 MG/ML
20 INJECTION, POWDER, LYOPHILIZED, FOR SOLUTION INTRAMUSCULAR; INTRAVENOUS ONCE
Status: CANCELLED
Start: 2023-11-24 | End: 2023-10-18

## 2023-09-29 RX ORDER — ALBUTEROL SULFATE 0.83 MG/ML
2.5 SOLUTION RESPIRATORY (INHALATION)
Status: CANCELLED | OUTPATIENT
Start: 2023-10-18

## 2023-09-29 RX ORDER — ALBUTEROL SULFATE 90 UG/1
1-2 AEROSOL, METERED RESPIRATORY (INHALATION)
Status: CANCELLED
Start: 2023-10-18

## 2023-09-29 RX ORDER — HEPARIN SODIUM (PORCINE) LOCK FLUSH IV SOLN 100 UNIT/ML 100 UNIT/ML
5 SOLUTION INTRAVENOUS
Status: CANCELLED | OUTPATIENT
Start: 2023-10-18

## 2023-09-29 RX ORDER — METHYLPREDNISOLONE SODIUM SUCCINATE 40 MG/ML
20 INJECTION, POWDER, LYOPHILIZED, FOR SOLUTION INTRAMUSCULAR; INTRAVENOUS ONCE
Status: COMPLETED | OUTPATIENT
Start: 2023-09-29 | End: 2023-09-29

## 2023-09-29 RX ORDER — NALOXONE HYDROCHLORIDE 0.4 MG/ML
0.2 INJECTION, SOLUTION INTRAMUSCULAR; INTRAVENOUS; SUBCUTANEOUS
Status: CANCELLED | OUTPATIENT
Start: 2023-10-18

## 2023-09-29 RX ORDER — MEPERIDINE HYDROCHLORIDE 25 MG/ML
25 INJECTION INTRAMUSCULAR; INTRAVENOUS; SUBCUTANEOUS EVERY 30 MIN PRN
Status: CANCELLED | OUTPATIENT
Start: 2023-10-18

## 2023-09-29 RX ORDER — HEPARIN SODIUM,PORCINE 10 UNIT/ML
5 VIAL (ML) INTRAVENOUS
Status: CANCELLED | OUTPATIENT
Start: 2023-10-18

## 2023-09-29 RX ORDER — METHYLPREDNISOLONE SODIUM SUCCINATE 125 MG/2ML
125 INJECTION, POWDER, LYOPHILIZED, FOR SOLUTION INTRAMUSCULAR; INTRAVENOUS
Status: CANCELLED
Start: 2023-10-18

## 2023-09-29 RX ORDER — EPINEPHRINE 1 MG/ML
0.3 INJECTION, SOLUTION INTRAMUSCULAR; SUBCUTANEOUS EVERY 5 MIN PRN
Status: CANCELLED | OUTPATIENT
Start: 2023-10-18

## 2023-09-29 RX ORDER — DIPHENHYDRAMINE HYDROCHLORIDE 50 MG/ML
50 INJECTION INTRAMUSCULAR; INTRAVENOUS
Status: CANCELLED
Start: 2023-10-18

## 2023-09-29 RX ADMIN — METHYLPREDNISOLONE SODIUM SUCCINATE 20 MG: 40 INJECTION, POWDER, FOR SOLUTION INTRAMUSCULAR; INTRAVENOUS at 12:02

## 2023-09-29 RX ADMIN — HUMAN IMMUNOGLOBULIN G 35 G: 20 LIQUID INTRAVENOUS at 12:10

## 2023-09-29 NOTE — PROGRESS NOTES
Pharmacist IVIG Stewardship Program    Diagnosis: Common Variable Immunodeficiency  Patient was originally diagnosed with Baires back in the 2000's and later transferred her care to [facility]   Date 5/27/2015   IgA Level  Undetectable   IgG Total 658   IgG2 106   IgG4 0.5   IgM Level 48     Current Dosing Regimen: Privigen 35g IV monthly.    Titrated down from every 3 week infusions to every 4 week infusions in 2017  Titrated down further from every 4 weeks to every 6 weeks infusions in March 2018, but patient declined drastically and had to be escalated back to monthly infusions in September 2018  Pre-medications:  Methylprednisolone 20 mg IV push prior to infusion  Current Titration Regimen: 0.5ml/kg/hr and increased by 0.5ml/kg/hr every 15 minutes, max rate 4ml/kg/hr.  Previously Tried Products: Gammagard SD, currently on Privigen    Side Effects: Patient denies side effects such as headaches, flushing, fever, muscle or joint pain, nausea, or rash/itching.    Interventions: Lab review complete      Assessment:   Date  9/27/23   IgA <2   IgM 13   IgG 414   Patient's level is below standard range and she is appropriate for additional IVIG therapy. She is tolerating infusions well and IVIG infusions are effective in increasing Ig levels to an appropriate level to minimize patients risk of infection. Patient should continue on treatment as she has been per provider orders, without therapy her levels would drop below therapeutic range.    Plan: Patient is okay to proceed with infusions as planned per provider order through until her next Immunoglobulin lab draw, tentatively scheduled for 10/17/2023.     Next Review Needed: 10/2023    Sly Hawkins PharmD  PGY1 Pharmacy Resident  Reviewed with Lynsey Narvaez PharmD, IgCP

## 2023-09-29 NOTE — PROGRESS NOTES
Infusion Nursing Note:  Tricia Sosa presents today for IVIG.    Patient seen by provider today: No   present during visit today: Not Applicable.    Note:  No fevers, active infection/illness, current antibiotics or recent surgeries    IVIG initiated at rate of 0.5mg/kg/hr. Increased by 0.5mg/kg/hr every 15 minutes to max rate of 4.0mg/kg/hr    Pt continues to receive IVIG monthly if IgG less than 600  Pt continues to receive Rituxan every 2 months for autoimmune hemolytic anemia      Intravenous Access:  Peripheral IV placed.    Treatment Conditions:  Results reviewed, labs MET treatment parameters, ok to proceed with treatment.  IgG 414 on 9/27/23.      Post Infusion Assessment:  Patient tolerated infusion without incident  Blood return noted pre and post infusion.  Site patent and intact, free from redness, edema or discomfort.  No evidence of extravasations.  Access discontinued per protocol.       Discharge Plan:   [facility]  Patient will return 10/18/23 for Rituxan for next appointment.   Patient discharged in stable condition accompanied by: self.  Departure Mode: Ambulatory with eloisa Davis RN

## 2023-10-03 NOTE — CONFIDENTIAL NOTE
DIAGNOSIS:    Low CD4 cell count determined by flow cytometry   CVID (common variable immunodeficiency)       DATE RECEIVED:  11.06.2023    NOTES (Gather within 2 years) STATUS DETAILS   OFFICE NOTE from referring provider   Internal 09.29.2023 Isaiah Man PA    OFFICE NOTE from other specialist     DISCHARGE SUMMARY from hospital     DISCHARGE REPORT from the ER     LABS (any labs) Internal    MEDICATION LIST Internal    IMAGING  (NEED IMAGES AND REPORTS)     Osteomyelitis: Foot imaging      Liver Abscess: Abdominal imaging     Other (anything related to diagnoses

## 2023-10-05 ENCOUNTER — THERAPY VISIT (OUTPATIENT)
Dept: PHYSICAL THERAPY | Facility: CLINIC | Age: 83
End: 2023-10-05
Attending: STUDENT IN AN ORGANIZED HEALTH CARE EDUCATION/TRAINING PROGRAM
Payer: MEDICARE

## 2023-10-05 DIAGNOSIS — M62.81 GENERALIZED MUSCLE WEAKNESS: Primary | ICD-10-CM

## 2023-10-05 PROCEDURE — 97110 THERAPEUTIC EXERCISES: CPT | Mod: GP | Performed by: PHYSICAL THERAPIST

## 2023-10-05 PROCEDURE — 97112 NEUROMUSCULAR REEDUCATION: CPT | Mod: GP | Performed by: PHYSICAL THERAPIST

## 2023-10-17 ENCOUNTER — INFUSION THERAPY VISIT (OUTPATIENT)
Dept: INFUSION THERAPY | Facility: CLINIC | Age: 83
End: 2023-10-17
Attending: INTERNAL MEDICINE
Payer: MEDICARE

## 2023-10-17 ENCOUNTER — ONCOLOGY VISIT (OUTPATIENT)
Dept: ONCOLOGY | Facility: CLINIC | Age: 83
End: 2023-10-17
Attending: INTERNAL MEDICINE
Payer: MEDICARE

## 2023-10-17 VITALS
DIASTOLIC BLOOD PRESSURE: 68 MMHG | OXYGEN SATURATION: 97 % | BODY MASS INDEX: 19.79 KG/M2 | TEMPERATURE: 98.5 F | SYSTOLIC BLOOD PRESSURE: 138 MMHG | WEIGHT: 145.9 LBS | HEART RATE: 75 BPM | RESPIRATION RATE: 16 BRPM

## 2023-10-17 DIAGNOSIS — D59.19 OTHER AUTOIMMUNE HEMOLYTIC ANEMIA (H): ICD-10-CM

## 2023-10-17 DIAGNOSIS — D83.9 CVID (COMMON VARIABLE IMMUNODEFICIENCY) (H): Primary | ICD-10-CM

## 2023-10-17 DIAGNOSIS — D59.10 AUTOIMMUNE HEMOLYTIC ANEMIA (H): ICD-10-CM

## 2023-10-17 LAB
ALBUMIN SERPL BCG-MCNC: 4.5 G/DL (ref 3.5–5.2)
ALP SERPL-CCNC: 114 U/L (ref 35–104)
ALT SERPL W P-5'-P-CCNC: 14 U/L (ref 0–50)
ANION GAP SERPL CALCULATED.3IONS-SCNC: 8 MMOL/L (ref 7–15)
AST SERPL W P-5'-P-CCNC: 28 U/L (ref 0–45)
BASO+EOS+MONOS # BLD AUTO: ABNORMAL 10*3/UL
BASO+EOS+MONOS NFR BLD AUTO: ABNORMAL %
BASOPHILS # BLD AUTO: 0 10E3/UL (ref 0–0.2)
BASOPHILS NFR BLD AUTO: 0 %
BILIRUB SERPL-MCNC: 0.5 MG/DL
BUN SERPL-MCNC: 19.1 MG/DL (ref 8–23)
CALCIUM SERPL-MCNC: 8.8 MG/DL (ref 8.8–10.2)
CHLORIDE SERPL-SCNC: 101 MMOL/L (ref 98–107)
CREAT SERPL-MCNC: 0.66 MG/DL (ref 0.51–0.95)
DEPRECATED HCO3 PLAS-SCNC: 28 MMOL/L (ref 22–29)
EGFRCR SERPLBLD CKD-EPI 2021: 87 ML/MIN/1.73M2
EOSINOPHIL # BLD AUTO: 0 10E3/UL (ref 0–0.7)
EOSINOPHIL NFR BLD AUTO: 0 %
ERYTHROCYTE [DISTWIDTH] IN BLOOD BY AUTOMATED COUNT: 13.6 % (ref 10–15)
GLUCOSE SERPL-MCNC: 101 MG/DL (ref 70–99)
HCT VFR BLD AUTO: 34 % (ref 35–47)
HGB BLD-MCNC: 11.7 G/DL (ref 11.7–15.7)
IMM GRANULOCYTES # BLD: 0 10E3/UL
IMM GRANULOCYTES NFR BLD: 0 %
LDH SERPL L TO P-CCNC: 204 U/L (ref 0–250)
LYMPHOCYTES # BLD AUTO: 2.2 10E3/UL (ref 0.8–5.3)
LYMPHOCYTES NFR BLD AUTO: 47 %
MCH RBC QN AUTO: 34.3 PG (ref 26.5–33)
MCHC RBC AUTO-ENTMCNC: 34.4 G/DL (ref 31.5–36.5)
MCV RBC AUTO: 100 FL (ref 78–100)
MONOCYTES # BLD AUTO: 0.4 10E3/UL (ref 0–1.3)
MONOCYTES NFR BLD AUTO: 9 %
NEUTROPHILS # BLD AUTO: 2.1 10E3/UL (ref 1.6–8.3)
NEUTROPHILS NFR BLD AUTO: 44 %
NRBC # BLD AUTO: 0 10E3/UL
NRBC BLD AUTO-RTO: 0 /100
PLATELET # BLD AUTO: 122 10E3/UL (ref 150–450)
POTASSIUM SERPL-SCNC: 4.3 MMOL/L (ref 3.4–5.3)
PROT SERPL-MCNC: 6 G/DL (ref 6.4–8.3)
RBC # BLD AUTO: 3.41 10E6/UL (ref 3.8–5.2)
RETICS # AUTO: 0.04 10E6/UL (ref 0.03–0.1)
RETICS/RBC NFR AUTO: 1.2 % (ref 0.5–2)
SODIUM SERPL-SCNC: 137 MMOL/L (ref 135–145)
WBC # BLD AUTO: 4.8 10E3/UL (ref 4–11)

## 2023-10-17 PROCEDURE — 80053 COMPREHEN METABOLIC PANEL: CPT | Performed by: INTERNAL MEDICINE

## 2023-10-17 PROCEDURE — 82784 ASSAY IGA/IGD/IGG/IGM EACH: CPT | Performed by: INTERNAL MEDICINE

## 2023-10-17 PROCEDURE — 99214 OFFICE O/P EST MOD 30 MIN: CPT | Performed by: INTERNAL MEDICINE

## 2023-10-17 PROCEDURE — 36415 COLL VENOUS BLD VENIPUNCTURE: CPT

## 2023-10-17 PROCEDURE — 85045 AUTOMATED RETICULOCYTE COUNT: CPT | Performed by: INTERNAL MEDICINE

## 2023-10-17 PROCEDURE — 83615 LACTATE (LD) (LDH) ENZYME: CPT | Performed by: INTERNAL MEDICINE

## 2023-10-17 PROCEDURE — 83010 ASSAY OF HAPTOGLOBIN QUANT: CPT | Performed by: INTERNAL MEDICINE

## 2023-10-17 PROCEDURE — 85025 COMPLETE CBC W/AUTO DIFF WBC: CPT | Performed by: INTERNAL MEDICINE

## 2023-10-17 PROCEDURE — G0463 HOSPITAL OUTPT CLINIC VISIT: HCPCS | Performed by: INTERNAL MEDICINE

## 2023-10-17 RX ORDER — DIPHENHYDRAMINE HYDROCHLORIDE 50 MG/ML
50 INJECTION INTRAMUSCULAR; INTRAVENOUS
Status: CANCELLED
Start: 2024-02-06

## 2023-10-17 RX ORDER — NALOXONE HYDROCHLORIDE 0.4 MG/ML
0.2 INJECTION, SOLUTION INTRAMUSCULAR; INTRAVENOUS; SUBCUTANEOUS
Status: CANCELLED | OUTPATIENT
Start: 2024-02-06

## 2023-10-17 RX ORDER — HEPARIN SODIUM (PORCINE) LOCK FLUSH IV SOLN 100 UNIT/ML 100 UNIT/ML
5 SOLUTION INTRAVENOUS
Status: CANCELLED | OUTPATIENT
Start: 2023-10-18

## 2023-10-17 RX ORDER — NALOXONE HYDROCHLORIDE 0.4 MG/ML
0.2 INJECTION, SOLUTION INTRAMUSCULAR; INTRAVENOUS; SUBCUTANEOUS
Status: CANCELLED | OUTPATIENT
Start: 2023-12-12

## 2023-10-17 RX ORDER — MEPERIDINE HYDROCHLORIDE 25 MG/ML
25 INJECTION INTRAMUSCULAR; INTRAVENOUS; SUBCUTANEOUS
Status: CANCELLED
Start: 2023-10-18

## 2023-10-17 RX ORDER — DIPHENHYDRAMINE HCL 25 MG
50 CAPSULE ORAL ONCE
Status: CANCELLED
Start: 2024-02-06

## 2023-10-17 RX ORDER — ALBUTEROL SULFATE 0.83 MG/ML
2.5 SOLUTION RESPIRATORY (INHALATION)
Status: CANCELLED | OUTPATIENT
Start: 2023-12-12

## 2023-10-17 RX ORDER — DIPHENHYDRAMINE HCL 25 MG
50 CAPSULE ORAL ONCE
Status: CANCELLED
Start: 2023-10-18

## 2023-10-17 RX ORDER — ALBUTEROL SULFATE 0.83 MG/ML
2.5 SOLUTION RESPIRATORY (INHALATION)
Status: CANCELLED | OUTPATIENT
Start: 2024-02-06

## 2023-10-17 RX ORDER — DIPHENHYDRAMINE HCL 25 MG
50 CAPSULE ORAL ONCE
Status: CANCELLED
Start: 2023-12-12

## 2023-10-17 RX ORDER — ALBUTEROL SULFATE 0.83 MG/ML
2.5 SOLUTION RESPIRATORY (INHALATION)
Status: CANCELLED | OUTPATIENT
Start: 2023-10-18

## 2023-10-17 RX ORDER — ALBUTEROL SULFATE 90 UG/1
1-2 AEROSOL, METERED RESPIRATORY (INHALATION)
Status: CANCELLED
Start: 2023-10-18

## 2023-10-17 RX ORDER — HEPARIN SODIUM,PORCINE 10 UNIT/ML
5 VIAL (ML) INTRAVENOUS
Status: CANCELLED | OUTPATIENT
Start: 2024-02-06

## 2023-10-17 RX ORDER — ACETAMINOPHEN 325 MG/1
650 TABLET ORAL ONCE
Status: CANCELLED
Start: 2023-12-12

## 2023-10-17 RX ORDER — EPINEPHRINE 1 MG/ML
0.3 INJECTION, SOLUTION INTRAMUSCULAR; SUBCUTANEOUS EVERY 5 MIN PRN
Status: CANCELLED | OUTPATIENT
Start: 2023-10-18

## 2023-10-17 RX ORDER — EPINEPHRINE 1 MG/ML
0.3 INJECTION, SOLUTION INTRAMUSCULAR; SUBCUTANEOUS EVERY 5 MIN PRN
Status: CANCELLED | OUTPATIENT
Start: 2024-02-06

## 2023-10-17 RX ORDER — METHYLPREDNISOLONE SODIUM SUCCINATE 125 MG/2ML
125 INJECTION, POWDER, LYOPHILIZED, FOR SOLUTION INTRAMUSCULAR; INTRAVENOUS
Status: CANCELLED
Start: 2023-12-12

## 2023-10-17 RX ORDER — HEPARIN SODIUM,PORCINE 10 UNIT/ML
5 VIAL (ML) INTRAVENOUS
Status: CANCELLED | OUTPATIENT
Start: 2023-12-12

## 2023-10-17 RX ORDER — METHYLPREDNISOLONE SODIUM SUCCINATE 125 MG/2ML
125 INJECTION, POWDER, LYOPHILIZED, FOR SOLUTION INTRAMUSCULAR; INTRAVENOUS
Status: CANCELLED
Start: 2024-02-06

## 2023-10-17 RX ORDER — ALBUTEROL SULFATE 90 UG/1
1-2 AEROSOL, METERED RESPIRATORY (INHALATION)
Status: CANCELLED
Start: 2023-12-12

## 2023-10-17 RX ORDER — HEPARIN SODIUM (PORCINE) LOCK FLUSH IV SOLN 100 UNIT/ML 100 UNIT/ML
5 SOLUTION INTRAVENOUS
Status: CANCELLED | OUTPATIENT
Start: 2023-12-12

## 2023-10-17 RX ORDER — EPINEPHRINE 1 MG/ML
0.3 INJECTION, SOLUTION INTRAMUSCULAR; SUBCUTANEOUS EVERY 5 MIN PRN
Status: CANCELLED | OUTPATIENT
Start: 2023-12-12

## 2023-10-17 RX ORDER — METHYLPREDNISOLONE SODIUM SUCCINATE 125 MG/2ML
125 INJECTION, POWDER, LYOPHILIZED, FOR SOLUTION INTRAMUSCULAR; INTRAVENOUS
Status: CANCELLED
Start: 2023-10-18

## 2023-10-17 RX ORDER — DIPHENHYDRAMINE HYDROCHLORIDE 50 MG/ML
50 INJECTION INTRAMUSCULAR; INTRAVENOUS
Status: CANCELLED
Start: 2023-10-18

## 2023-10-17 RX ORDER — ACETAMINOPHEN 325 MG/1
650 TABLET ORAL ONCE
Status: CANCELLED
Start: 2024-02-06

## 2023-10-17 RX ORDER — HEPARIN SODIUM (PORCINE) LOCK FLUSH IV SOLN 100 UNIT/ML 100 UNIT/ML
5 SOLUTION INTRAVENOUS
Status: CANCELLED | OUTPATIENT
Start: 2024-02-06

## 2023-10-17 RX ORDER — NALOXONE HYDROCHLORIDE 0.4 MG/ML
0.2 INJECTION, SOLUTION INTRAMUSCULAR; INTRAVENOUS; SUBCUTANEOUS
Status: CANCELLED | OUTPATIENT
Start: 2023-10-18

## 2023-10-17 RX ORDER — DIPHENHYDRAMINE HYDROCHLORIDE 50 MG/ML
50 INJECTION INTRAMUSCULAR; INTRAVENOUS
Status: CANCELLED
Start: 2023-12-12

## 2023-10-17 RX ORDER — ALBUTEROL SULFATE 90 UG/1
1-2 AEROSOL, METERED RESPIRATORY (INHALATION)
Status: CANCELLED
Start: 2024-02-06

## 2023-10-17 RX ORDER — MEPERIDINE HYDROCHLORIDE 25 MG/ML
25 INJECTION INTRAMUSCULAR; INTRAVENOUS; SUBCUTANEOUS EVERY 30 MIN PRN
Status: CANCELLED | OUTPATIENT
Start: 2024-02-06

## 2023-10-17 RX ORDER — ACETAMINOPHEN 325 MG/1
650 TABLET ORAL ONCE
Status: CANCELLED
Start: 2023-10-18

## 2023-10-17 RX ORDER — MEPERIDINE HYDROCHLORIDE 25 MG/ML
25 INJECTION INTRAMUSCULAR; INTRAVENOUS; SUBCUTANEOUS EVERY 30 MIN PRN
Status: CANCELLED | OUTPATIENT
Start: 2023-12-12

## 2023-10-17 RX ORDER — MEPERIDINE HYDROCHLORIDE 25 MG/ML
25 INJECTION INTRAMUSCULAR; INTRAVENOUS; SUBCUTANEOUS
Status: CANCELLED
Start: 2024-02-06

## 2023-10-17 RX ORDER — HEPARIN SODIUM,PORCINE 10 UNIT/ML
5 VIAL (ML) INTRAVENOUS
Status: CANCELLED | OUTPATIENT
Start: 2023-10-18

## 2023-10-17 RX ORDER — MEPERIDINE HYDROCHLORIDE 25 MG/ML
25 INJECTION INTRAMUSCULAR; INTRAVENOUS; SUBCUTANEOUS
Status: CANCELLED
Start: 2023-12-12

## 2023-10-17 RX ORDER — MEPERIDINE HYDROCHLORIDE 25 MG/ML
25 INJECTION INTRAMUSCULAR; INTRAVENOUS; SUBCUTANEOUS EVERY 30 MIN PRN
Status: CANCELLED | OUTPATIENT
Start: 2023-10-18

## 2023-10-17 ASSESSMENT — PAIN SCALES - GENERAL: PAINLEVEL: NO PAIN (0)

## 2023-10-17 NOTE — NURSING NOTE
Oncology Rooming Note    October 17, 2023 2:28 PM   Tricia Sosa is a 82 year old female who presents for:    Chief Complaint   Patient presents with    Oncology Clinic Visit     Initial Vitals: /68   Pulse 75   Temp 98.5  F (36.9  C) (Oral)   Resp 16   Wt 66.2 kg (145 lb 14.4 oz)   LMP  (LMP Unknown)   SpO2 97%   BMI 19.79 kg/m   Estimated body mass index is 19.79 kg/m  as calculated from the following:    Height as of 8/22/23: 1.829 m (6').    Weight as of this encounter: 66.2 kg (145 lb 14.4 oz). Body surface area is 1.83 meters squared.  No Pain (0) Comment: Data Unavailable   No LMP recorded (lmp unknown). Patient is postmenopausal.  Allergies reviewed: Yes  Medications reviewed: Yes    Medications: Medication refills not needed today.  Pharmacy name entered into iNEWiT:    [place], [place] - [address] [id number] [place], [place] - [address] [facility] [facility]    Clinical concerns: follow up       Eunice Wiggins

## 2023-10-17 NOTE — LETTER
10/17/2023         RE: Tricia Sosa  [id number]  [place] [facility] [address]        Dear Colleague,    Thank you for referring your patient, Tricia Sosa, to the [facility]. Please see a copy of my visit note below.    [facility]    Hematology/Oncology Established Patient Follow-up Note    Treatment Summary:      Today's Date: 10/17/23    Reason for Follow-up: Hemolytic anemia-autoimmune; IgA deficiency     HISTORY OF PRESENT ILLNESS: Tricia Sosa is an 82 year old female who presents with autoimmune hemolytic anemia and IgA deficiency.  She previously saw Dr. Matos and then Dr. Summers.  She was previously seen at [facility].     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  She was very sick at the time and had severe diarrhea.  She lost about 40 pounds in weight.  The workup revealed that she had markedly diminished CD4 counts of 48 and was diagnosed with CMV colitis.  Since then she has been followed in hematology clinic at [place] until recently.  She was noted to have anemia which was fairly long-standing.  She was initially referred for anemia in 2004 and also had a bone marrow biopsy done at that time which revealed hypercellular bone marrow with 70-80% cellularity and normal trilineage hematopoiesis and no morphologic features of MDS or lymphoproliferation.  Her anemia was attributed to her chronic underlying disease.  At the next evaluation in 2002 on 4 aggressive anemia there was no evidence of hemolysis, iron deficiency, B12 or folate deficiency.  Bone marrow biopsy was not pursued.  In summer of 2015 she had progressive fatigue, dyspnea on exertion and worsening anemia with a hemoglobin now of 9.  Workup at this time did suggest a hemolytic anemia with elevated LDH at 709, undetectable haptoglobin and elevated unconjugated bilirubin at 3.3.  Monospecific MARIKA was positive for both IgG and  complement.  Cold agglutinin screen was positive with very low titer 1:64.  She was started on prednisone 60 mg daily with supplementation of iron folate and B12 starting 7/31/15.  Her prednisone has been slowly tapered and was discontinued off in January 2016.  She was last followed at [facility] on March 10, 2016 when she had stable hemoglobin.     She was followed by Dr. Matos and Dr. Summers.  Due to relapse of hemolytic anemia, she underwent another course of prednisone that has since been tapered off and rituximab x 4 weekly doses completed in 9/19/19.     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  She started weekly Rituxan on 7/31/20 x 4 doses, completed on 8/21/20.  She tapered off of prednisone in October 2020.     Due to relapse of her AIHA, she started prednisone again on 6/8/21 at 60 mg daily with slow taper and finished taper in August 2021.     Due to relapse of AIHA again, she started prednisone again on 11/2/21 at 70 mg daily with slow taper.  She also completed weekly Rituxan again x 4 doses 12/6/21-12/29/21.    Venofer 5/5-5/19/22.      INTERIM HISTORY:  Patient continues to do well.  She continues rituximab on maintenance every 2 months. She continues on IVIG as well (9/29/23).    No weight loss, fatigue, LAD, night sweats, fever. No gross evidence of bleed. She states she will not have cyst removal at this time due to too many appointments.         REVIEW OF SYSTEMS:   A 14 point ROS was reviewed with pertinent positives and negatives in the HPI.       HOME MEDICATIONS:  Current Outpatient Medications   Medication Sig Dispense Refill     cyanocobalamin (VITAMIN  B-12) 1000 MCG tablet   3     folic acid (FOLVITE) 1 MG tablet   3     ketoconazole (NIZORAL) 2 % external shampoo Use every other day to scalp as needed 120 mL 0     levothyroxine (SYNTHROID/LEVOTHROID) 150 MCG tablet Take one tablet  by mouth daily 90 tablet 2      triamcinolone (KENALOG) 0.1 % external ointment Apply topically 2 times daily           ALLERGIES:  Allergies   Allergen Reactions     Doxycycline          PAST MEDICAL HISTORY:  Past Medical History:   Diagnosis Date     WARD (dyspnea on exertion)      External hemorrhoids without mention of complication 6/27/05     Hemolytic anemia (H24)     autoimmune     Hypothyroidism      IgA deficiency (H)      Myopia of both eyes with astigmatism and presbyopia 8/16/2017     Other malaise and fatigue      Other severe protein-calorie malnutrition      Other vitreous opacities 12/19/2006     Thyroid disease      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    Hemorrhage Subdural Trauma Without LOC Active     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, and posterior vitreous detachment     Urinary tract infection, site not specified     Recurrent UTI's         PAST SURGICAL HISTORY:  Past Surgical History:   Procedure Laterality Date     COLONOSCOPY N/A 4/26/2016    Procedure: COLONOSCOPY;  Surgeon: Dontae Del Rio MD;  Location:  GI     ENT SURGERY  1985    Thyroid lobectomy     EYE SURGERY Bilateral 04/20/2021    Cataract Surgery     HC CYSTOSCOPY,INSERT URETERAL STENT      Ureteral stent insertion     HC FLEX SIGMOIDOSCOPY W BIOPSY  5/30/00      LAPAROSCOPY, SURGICAL; CHOLECYSTECTOMY  1992      THYROIDECTOMY       LIGATION OF HEMORRHOID(S)      Hemorrhoidectomy     UPPER GI ENDOSCOPY,BIOPSY  10/01/01     ZZC LIGATE FALLOPIAN TUBE       ZZHC COLONOSCOPY W BIOPSY  4/26/00     ZZHC COLONOSCOPY W BIOPSY  10/8/03     ZZHC COLONOSCOPY W BIOPSY  6/27/05    REPEAT IN 5 YEARS         SOCIAL HISTORY:  Social History     Socioeconomic History     Marital status:      Spouse name: Not on file     Number of children: Not on file     Years of education: Not on file     Highest education level: Not on file   Occupational History     Not on file   Tobacco Use     Smoking status: Never      Smokeless tobacco: Never   Vaping Use     Vaping Use: Never used   Substance and Sexual Activity     Alcohol use: Yes     Alcohol/week: 0.0 standard drinks of alcohol     Comment: 1 a day at most     Drug use: No     Sexual activity: Not Currently     Partners: Male   Other Topics Concern     Parent/sibling w/ CABG, MI or angioplasty before 65F 55M? No   Social History Narrative     Not on file     Social Determinants of Health     Financial Resource Strain: Not on file   Food Insecurity: Not on file   Transportation Needs: Not on file   Physical Activity: Not on file   Stress: Not on file   Social Connections: Not on file   Interpersonal Safety: Not At Risk (2023)    Humiliation, Afraid, Rape, and Kick questionnaire      Fear of Current or Ex-Partner: No      Emotionally Abused: No      Physically Abused: No      Sexually Abused: No   Housing Stability: Not on file         FAMILY HISTORY:  Family History   Problem Relation Age of Onset     Colon Cancer Mother 90     Cerebrovascular Disease Father          at age 85     Dementia Brother      Prostate Cancer Brother      Thyroid Disease Brother      Dementia Brother      Thyroid Disease Brother      Pancreatic Cancer Brother      Breast Cancer Sister      Hyperlipidemia Sister      Depression Sister      Anxiety Disorder Sister      Thyroid Disease Sister      Breast Cancer Sister      Colon Cancer Niece      Other Cancer Niece         leukemia         PHYSICAL EXAM:  /68   Pulse 75   Temp 98.5  F (36.9  C) (Oral)   Resp 16   Wt 66.2 kg (145 lb 14.4 oz)   LMP  (LMP Unknown)   SpO2 97%   BMI 19.79 kg/m    PHYSICAL EXAMINATION:     GENERAL/CONSTITUTIONAL: No acute distress.  RESPIRATORY: Clear to auscultation bilaterally. No crackles or wheezing.   CARDIOVASCULAR: Regular rate and rhythm without murmurs, gallops, or rubs.  MUSCULOSKELETAL: Warm and well-perfused, no cyanosis, clubbing, or edema.  NEUROLOGIC: Cranial nerves II-XII are intact.  Alert, oriented, answers questions appropriately.  INTEGUMENTARY: No rashes or jaundice.  GAIT: Steady, does not use assistive device.      LABS:   Latest Reference Range & Units 10/17/23 14:37   WBC 4.0 - 11.0 10e3/uL 4.8   Hemoglobin 11.7 - 15.7 g/dL 11.7   Hematocrit 35.0 - 47.0 % 34.0 (L)   Platelet Count 150 - 450 10e3/uL 122 (L)   RBC Count 3.80 - 5.20 10e6/uL 3.41 (L)   MCV 78 - 100 fL 100   MCH 26.5 - 33.0 pg 34.3 (H)   MCHC 31.5 - 36.5 g/dL 34.4   RDW 10.0 - 15.0 % 13.6   % Neutrophils % 44   % Lymphocytes % 47   % Monocytes % 9   % Eosinophils % 0   % Basophils % 0   Absolute Basophils 0.0 - 0.2 10e3/uL 0.0   Absolute Eosinophils 0.0 - 0.7 10e3/uL 0.0   Absolute Immature Granulocytes <=0.4 10e3/uL 0.0   Absolute Lymphocytes 0.8 - 5.3 10e3/uL 2.2   Absolute Monocytes 0.0 - 1.3 10e3/uL 0.4   % Immature Granulocytes % 0   Absolute Neutrophils 1.6 - 8.3 10e3/uL 2.1   Absolute NRBCs 10e3/uL 0.0   NRBCs per 100 WBC <1 /100 0   % Retic 0.5 - 2.0 % 1.2   Absolute Retic 0.025 - 0.095 10e6/uL 0.042      Latest Reference Range & Units 09/27/23 13:56   Haptoglobin 32 - 197 mg/dL <3 (L)   IGA 84 - 499 mg/dL <2 (L)    - 1,616 mg/dL 414 (L)   IGM 35 - 242 mg/dL 13 (L)         IMAGING:  BILATERAL FULL FIELD DIGITAL SCREENING MAMMOGRAM WITH TOMOSYNTHESIS     Performed on: 6/23/22     IMPRESSION: ACR BI-RADS Category 1: Negative     RECOMMENDED FOLLOW-UP: Annual routine screening mammogram          ASSESSMENT/PLAN:  Tricia Sosa is an 82 year old female with:        Warm autoimmune hemolytic anemia (positive MARIKA to IgG and complement)  Positive cold agglutinin screen with low cold agglutinin titers  Common variable immunodeficiency disorder  Splenomegaly           1) Autoimmune hemolytic anemia: She had relapse in July 2020, and started on prednisone, as well as weekly rituximab infusions x 4, completed on 8/21/20.  Since then, she has been off and on prednisone and rituximab, getting response each time, but  relapses are getting more frequent.       She does not want to consider splenectomy yet.      She is completing another 4 doses of weekly rituxumab in May 2022, and then will go on maintenance rituximab to try to maintain her hemoglobin longer.  She wants to try to avoid having to go back on steroids if possible. She previously tapered off of prednisone again as of early March 2022. Off bactrim as well.    -Hemoglobin is stable in the 12's.  -Monitor CBC monthly.  -Continue maintenance rituximab every 2 months.     2) CVID:   -Continue IVIG.  She gets it, if IgG is <600.   -IgG check and IVIG every 4 weeks if meets parameters  -She saw immunology in [place] who recommended IVIG monthly, but our financial team continues to check and says that it is only covered with IgG is <600.      3) Iron-deficiency anemia  -s/p Venofer in May 2022.     4) Thrombocytopenia  -Waxing/waning.  -Monitor.    5) Left neck cyst  -Should be okay for removal so long as platelets are stable.     6) -Labs reviewed. CMP pending. Otherwise, okay for treatment 10/18/23.  -Alternate follow up between FELIZ and myself every 2 months with date of rituximab (schedule through February 2024).  -Continue IVIG as is if meeting parameters.    Leandra Ryder DO  Hematology/Oncology  [facility]      Again, thank you for allowing me to participate in the care of your patient.        Sincerely,        Leandra Ryder DO

## 2023-10-17 NOTE — PROGRESS NOTES
[facility]    Hematology/Oncology Established Patient Follow-up Note    Treatment Summary:      Today's Date: 10/17/23    Reason for Follow-up: Hemolytic anemia-autoimmune; IgA deficiency     HISTORY OF PRESENT ILLNESS: Tricia Sosa is an 82 year old female who presents with autoimmune hemolytic anemia and IgA deficiency.  She previously saw Dr. Matos and then Dr. Summers.  She was previously seen at [facility].     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  She was very sick at the time and had severe diarrhea.  She lost about 40 pounds in weight.  The workup revealed that she had markedly diminished CD4 counts of 48 and was diagnosed with CMV colitis.  Since then she has been followed in hematology clinic at [place] until recently.  She was noted to have anemia which was fairly long-standing.  She was initially referred for anemia in 2004 and also had a bone marrow biopsy done at that time which revealed hypercellular bone marrow with 70-80% cellularity and normal trilineage hematopoiesis and no morphologic features of MDS or lymphoproliferation.  Her anemia was attributed to her chronic underlying disease.  At the next evaluation in 2002 on 4 aggressive anemia there was no evidence of hemolysis, iron deficiency, B12 or folate deficiency.  Bone marrow biopsy was not pursued.  In summer of 2015 she had progressive fatigue, dyspnea on exertion and worsening anemia with a hemoglobin now of 9.  Workup at this time did suggest a hemolytic anemia with elevated LDH at 709, undetectable haptoglobin and elevated unconjugated bilirubin at 3.3.  Monospecific MARIKA was positive for both IgG and complement.  Cold agglutinin screen was positive with very low titer 1:64.  She was started on prednisone 60 mg daily with supplementation of iron folate and B12 starting 7/31/15.  Her prednisone has been slowly tapered and was discontinued off in January 2016.  She was  last followed at [facility] on March 10, 2016 when she had stable hemoglobin.     She was followed by Dr. Matos and Dr. Summers.  Due to relapse of hemolytic anemia, she underwent another course of prednisone that has since been tapered off and rituximab x 4 weekly doses completed in 9/19/19.     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  She started weekly Rituxan on 7/31/20 x 4 doses, completed on 8/21/20.  She tapered off of prednisone in October 2020.     Due to relapse of her AIHA, she started prednisone again on 6/8/21 at 60 mg daily with slow taper and finished taper in August 2021.     Due to relapse of AIHA again, she started prednisone again on 11/2/21 at 70 mg daily with slow taper.  She also completed weekly Rituxan again x 4 doses 12/6/21-12/29/21.    Venofer 5/5-5/19/22.      INTERIM HISTORY:  Patient continues to do well.  She continues rituximab on maintenance every 2 months. She continues on IVIG as well (9/29/23).    No weight loss, fatigue, LAD, night sweats, fever. No gross evidence of bleed. She states she will not have cyst removal at this time due to too many appointments.         REVIEW OF SYSTEMS:   A 14 point ROS was reviewed with pertinent positives and negatives in the HPI.       HOME MEDICATIONS:  Current Outpatient Medications   Medication Sig Dispense Refill    cyanocobalamin (VITAMIN  B-12) 1000 MCG tablet   3    folic acid (FOLVITE) 1 MG tablet   3    ketoconazole (NIZORAL) 2 % external shampoo Use every other day to scalp as needed 120 mL 0    levothyroxine (SYNTHROID/LEVOTHROID) 150 MCG tablet Take one tablet  by mouth daily 90 tablet 2    triamcinolone (KENALOG) 0.1 % external ointment Apply topically 2 times daily           ALLERGIES:  Allergies   Allergen Reactions    Doxycycline          PAST MEDICAL HISTORY:  Past Medical History:   Diagnosis Date    WARD (dyspnea on exertion)     External hemorrhoids without mention  of complication 6/27/05    Hemolytic anemia (H24)     autoimmune    Hypothyroidism     IgA deficiency (H)     Myopia of both eyes with astigmatism and presbyopia 8/16/2017    Other malaise and fatigue     Other severe protein-calorie malnutrition     Other vitreous opacities 12/19/2006    Thyroid disease     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    Hemorrhage Subdural Trauma Without LOC Active    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, and posterior vitreous detachment    Urinary tract infection, site not specified     Recurrent UTI's         PAST SURGICAL HISTORY:  Past Surgical History:   Procedure Laterality Date    COLONOSCOPY N/A 4/26/2016    Procedure: COLONOSCOPY;  Surgeon: Dontae Del Rio MD;  Location:  GI    ENT SURGERY  1985    Thyroid lobectomy    EYE SURGERY Bilateral 04/20/2021    Cataract Surgery    HC CYSTOSCOPY,INSERT URETERAL STENT      Ureteral stent insertion    HC FLEX SIGMOIDOSCOPY W BIOPSY  5/30/00    HC LAPAROSCOPY, SURGICAL; CHOLECYSTECTOMY  1992    HC THYROIDECTOMY      LIGATION OF HEMORRHOID(S)      Hemorrhoidectomy    UPPER GI ENDOSCOPY,BIOPSY  10/01/01    ZZC LIGATE FALLOPIAN TUBE      ZZHC COLONOSCOPY W BIOPSY  4/26/00    ZZHC COLONOSCOPY W BIOPSY  10/8/03    ZZHC COLONOSCOPY W BIOPSY  6/27/05    REPEAT IN 5 YEARS         SOCIAL HISTORY:  Social History     Socioeconomic History    Marital status:      Spouse name: Not on file    Number of children: Not on file    Years of education: Not on file    Highest education level: Not on file   Occupational History    Not on file   Tobacco Use    Smoking status: Never    Smokeless tobacco: Never   Vaping Use    Vaping Use: Never used   Substance and Sexual Activity    Alcohol use: Yes     Alcohol/week: 0.0 standard drinks of alcohol     Comment: 1 a day at most    Drug use: No    Sexual activity: Not Currently     Partners: Male   Other Topics Concern    Parent/sibling w/ CABG, MI  or angioplasty before 65F 55M? No   Social History Narrative    Not on file     Social Determinants of Health     Financial Resource Strain: Not on file   Food Insecurity: Not on file   Transportation Needs: Not on file   Physical Activity: Not on file   Stress: Not on file   Social Connections: Not on file   Interpersonal Safety: Not At Risk (2023)    Humiliation, Afraid, Rape, and Kick questionnaire     Fear of Current or Ex-Partner: No     Emotionally Abused: No     Physically Abused: No     Sexually Abused: No   Housing Stability: Not on file         FAMILY HISTORY:  Family History   Problem Relation Age of Onset    Colon Cancer Mother 90    Cerebrovascular Disease Father          at age 85    Dementia Brother     Prostate Cancer Brother     Thyroid Disease Brother     Dementia Brother     Thyroid Disease Brother     Pancreatic Cancer Brother     Breast Cancer Sister     Hyperlipidemia Sister     Depression Sister     Anxiety Disorder Sister     Thyroid Disease Sister     Breast Cancer Sister     Colon Cancer Niece     Other Cancer Niece         leukemia         PHYSICAL EXAM:  /68   Pulse 75   Temp 98.5  F (36.9  C) (Oral)   Resp 16   Wt 66.2 kg (145 lb 14.4 oz)   LMP  (LMP Unknown)   SpO2 97%   BMI 19.79 kg/m    PHYSICAL EXAMINATION:     GENERAL/CONSTITUTIONAL: No acute distress.  RESPIRATORY: Clear to auscultation bilaterally. No crackles or wheezing.   CARDIOVASCULAR: Regular rate and rhythm without murmurs, gallops, or rubs.  MUSCULOSKELETAL: Warm and well-perfused, no cyanosis, clubbing, or edema.  NEUROLOGIC: Cranial nerves II-XII are intact. Alert, oriented, answers questions appropriately.  INTEGUMENTARY: No rashes or jaundice.  GAIT: Steady, does not use assistive device.      LABS:   Latest Reference Range & Units 10/17/23 14:37   WBC 4.0 - 11.0 10e3/uL 4.8   Hemoglobin 11.7 - 15.7 g/dL 11.7   Hematocrit 35.0 - 47.0 % 34.0 (L)   Platelet Count 150 - 450 10e3/uL 122 (L)   RBC Count  3.80 - 5.20 10e6/uL 3.41 (L)   MCV 78 - 100 fL 100   MCH 26.5 - 33.0 pg 34.3 (H)   MCHC 31.5 - 36.5 g/dL 34.4   RDW 10.0 - 15.0 % 13.6   % Neutrophils % 44   % Lymphocytes % 47   % Monocytes % 9   % Eosinophils % 0   % Basophils % 0   Absolute Basophils 0.0 - 0.2 10e3/uL 0.0   Absolute Eosinophils 0.0 - 0.7 10e3/uL 0.0   Absolute Immature Granulocytes <=0.4 10e3/uL 0.0   Absolute Lymphocytes 0.8 - 5.3 10e3/uL 2.2   Absolute Monocytes 0.0 - 1.3 10e3/uL 0.4   % Immature Granulocytes % 0   Absolute Neutrophils 1.6 - 8.3 10e3/uL 2.1   Absolute NRBCs 10e3/uL 0.0   NRBCs per 100 WBC <1 /100 0   % Retic 0.5 - 2.0 % 1.2   Absolute Retic 0.025 - 0.095 10e6/uL 0.042      Latest Reference Range & Units 09/27/23 13:56   Haptoglobin 32 - 197 mg/dL <3 (L)   IGA 84 - 499 mg/dL <2 (L)    - 1,616 mg/dL 414 (L)   IGM 35 - 242 mg/dL 13 (L)         IMAGING:  BILATERAL FULL FIELD DIGITAL SCREENING MAMMOGRAM WITH TOMOSYNTHESIS     Performed on: 6/23/22     IMPRESSION: ACR BI-RADS Category 1: Negative     RECOMMENDED FOLLOW-UP: Annual routine screening mammogram          ASSESSMENT/PLAN:  Tricia Sosa is an 82 year old female with:        Warm autoimmune hemolytic anemia (positive MARIKA to IgG and complement)  Positive cold agglutinin screen with low cold agglutinin titers  Common variable immunodeficiency disorder  Splenomegaly           1) Autoimmune hemolytic anemia: She had relapse in July 2020, and started on prednisone, as well as weekly rituximab infusions x 4, completed on 8/21/20.  Since then, she has been off and on prednisone and rituximab, getting response each time, but relapses are getting more frequent.       She does not want to consider splenectomy yet.      She is completing another 4 doses of weekly rituxumab in May 2022, and then will go on maintenance rituximab to try to maintain her hemoglobin longer.  She wants to try to avoid having to go back on steroids if possible. She previously tapered off of prednisone  again as of early March 2022. Off bactrim as well.    -Hemoglobin is stable in the 12's.  -Monitor CBC monthly.  -Continue maintenance rituximab every 2 months.     2) CVID:   -Continue IVIG.  She gets it, if IgG is <600.   -IgG check and IVIG every 4 weeks if meets parameters  -She saw immunology in [place] who recommended IVIG monthly, but our financial team continues to check and says that it is only covered with IgG is <600.      3) Iron-deficiency anemia  -s/p Venofer in May 2022.     4) Thrombocytopenia  -Waxing/waning.  -Monitor.    5) Left neck cyst  -Should be okay for removal so long as platelets are stable.     6) -Labs reviewed. CMP pending. Otherwise, okay for treatment 10/18/23.  -Alternate follow up between FELIZ and myself every 2 months with date of rituximab (schedule through February 2024).  -Continue IVIG as is if meeting parameters.    Leandra Ryder, DO  Hematology/Oncology  [facility]

## 2023-10-17 NOTE — PROGRESS NOTES
Nursing Note:  Tricia Sosa presents today for Labs only.    Patient to be seen by provider today: Yes: Dr. Leandra Ryder   present during visit today: Not Applicable.    Note: N/A.    Intravenous Access:  Lab draw site right AC, Needle type butterfly, Gauge 23.  Labs drawn without difficulty.    Discharge Plan:   Patient was sent to clinic r provider appointment.      Hayes Hernandez RN

## 2023-10-18 ENCOUNTER — INFUSION THERAPY VISIT (OUTPATIENT)
Dept: INFUSION THERAPY | Facility: CLINIC | Age: 83
End: 2023-10-18
Attending: INTERNAL MEDICINE
Payer: MEDICARE

## 2023-10-18 VITALS
DIASTOLIC BLOOD PRESSURE: 71 MMHG | SYSTOLIC BLOOD PRESSURE: 120 MMHG | RESPIRATION RATE: 16 BRPM | BODY MASS INDEX: 19.58 KG/M2 | HEART RATE: 72 BPM | WEIGHT: 144.4 LBS | OXYGEN SATURATION: 96 % | TEMPERATURE: 98.2 F

## 2023-10-18 DIAGNOSIS — D83.9 CVID (COMMON VARIABLE IMMUNODEFICIENCY) (H): Primary | ICD-10-CM

## 2023-10-18 DIAGNOSIS — D59.19 OTHER AUTOIMMUNE HEMOLYTIC ANEMIA (H): ICD-10-CM

## 2023-10-18 LAB
HAPTOGLOB SERPL-MCNC: <3 MG/DL (ref 32–197)
IGA SERPL-MCNC: <2 MG/DL (ref 84–499)
IGG SERPL-MCNC: 686 MG/DL (ref 610–1616)
IGM SERPL-MCNC: 14 MG/DL (ref 35–242)

## 2023-10-18 PROCEDURE — 96413 CHEMO IV INFUSION 1 HR: CPT

## 2023-10-18 PROCEDURE — 250N000013 HC RX MED GY IP 250 OP 250 PS 637: Performed by: INTERNAL MEDICINE

## 2023-10-18 PROCEDURE — 250N000011 HC RX IP 250 OP 636: Mod: JZ | Performed by: INTERNAL MEDICINE

## 2023-10-18 PROCEDURE — 258N000003 HC RX IP 258 OP 636: Performed by: INTERNAL MEDICINE

## 2023-10-18 PROCEDURE — 96415 CHEMO IV INFUSION ADDL HR: CPT

## 2023-10-18 RX ORDER — DIPHENHYDRAMINE HCL 25 MG
50 CAPSULE ORAL ONCE
Status: COMPLETED | OUTPATIENT
Start: 2023-10-18 | End: 2023-10-18

## 2023-10-18 RX ORDER — ACETAMINOPHEN 325 MG/1
650 TABLET ORAL ONCE
Status: COMPLETED | OUTPATIENT
Start: 2023-10-18 | End: 2023-10-18

## 2023-10-18 RX ADMIN — ACETAMINOPHEN 650 MG: 325 TABLET ORAL at 12:50

## 2023-10-18 RX ADMIN — SODIUM CHLORIDE 250 ML: 9 INJECTION, SOLUTION INTRAVENOUS at 13:15

## 2023-10-18 RX ADMIN — RITUXIMAB-ABBS 700 MG: 10 INJECTION, SOLUTION INTRAVENOUS at 13:15

## 2023-10-18 RX ADMIN — DIPHENHYDRAMINE HYDROCHLORIDE 25 MG: 25 CAPSULE ORAL at 12:50

## 2023-10-18 NOTE — PROGRESS NOTES
Infusion Nursing Note:  Tricia Sosa presents today for Cycle 9 Day 1: Rituxan.    Patient seen by provider today: No   present during visit today: Not Applicable.    Note: N/A.      Intravenous Access:  Peripheral IV placed.    Treatment Conditions:  Lab Results   Component Value Date    HGB 11.7 10/17/2023    WBC 4.8 10/17/2023    ANEU 6.8 07/06/2021    ANEUTAUTO 2.1 10/17/2023     (L) 10/17/2023        Lab Results   Component Value Date     10/17/2023    POTASSIUM 4.3 10/17/2023    MAG 2.00 06/08/2000    CR 0.66 10/17/2023    CULLEN 8.8 10/17/2023    BILITOTAL 0.5 10/17/2023    ALBUMIN 4.5 10/17/2023    ALT 14 10/17/2023    AST 28 10/17/2023       Results reviewed, labs MET treatment parameters, ok to proceed with treatment.      Post Infusion Assessment:  Patient tolerated infusion without incident.  Blood return noted pre and post infusion.  Site patent and intact, free from redness, edema or discomfort.  No evidence of extravasations.  Access discontinued per protocol.       Discharge Plan:   Discharge instructions reviewed with: Patient.  Patient and/or family verbalized understanding of discharge instructions and all questions answered.  AVS to patient via [employer].  Patient will return 11/24/23 for next appointment.   Patient discharged in stable condition accompanied by: self.  Departure Mode: Ambulatory.      Kari Schoen, RN

## 2023-10-19 ENCOUNTER — DOCUMENTATION ONLY (OUTPATIENT)
Dept: PHARMACY | Facility: CLINIC | Age: 83
End: 2023-10-19
Payer: MEDICARE

## 2023-10-19 ENCOUNTER — THERAPY VISIT (OUTPATIENT)
Dept: PHYSICAL THERAPY | Facility: CLINIC | Age: 83
End: 2023-10-19
Attending: STUDENT IN AN ORGANIZED HEALTH CARE EDUCATION/TRAINING PROGRAM
Payer: MEDICARE

## 2023-10-19 DIAGNOSIS — M62.81 GENERALIZED MUSCLE WEAKNESS: Primary | ICD-10-CM

## 2023-10-19 DIAGNOSIS — R53.83 OTHER FATIGUE: ICD-10-CM

## 2023-10-19 PROCEDURE — 97110 THERAPEUTIC EXERCISES: CPT | Mod: GP | Performed by: PHYSICAL THERAPIST

## 2023-10-19 NOTE — PROGRESS NOTES
Pharmacist IVIG Stewardship Program    Diagnosis: Common Variable Immunodeficiency  Patient was originally diagnosed with [facility] in the 2000's and later transferred her care to [facility]   Date 5/27/2015   IgA Level  Undetectable   IgG Total 658   IgG2 106   IgG4 0.5   IgM Level 48     Current Dosing Regimen: Privigen 35g IV monthly.    Titrated down from every 3 week infusions to every 4 week infusions in 2017  Titrated down further from every 4 weeks to every 6 weeks infusions in March 2018, but patient declined drastically and had to be escalated back to monthly infusions in September 2018  Pre-medications:  Methylprednisolone 20 mg IV push prior to infusion  Current Titration Regimen: 0.5ml/kg/hr and increased by 0.5ml/kg/hr every 15 minutes, max rate 4ml/kg/hr.  Previously Tried Products: Gammagard SD, currently on Privigen    Side Effects: Patient denies side effects such as headaches, flushing, fever, muscle or joint pain, nausea, or rash/itching.    Interventions: Lab review completed.       Assessment:   Date  10/17/23   IgA <2   IgM 14   Patient's level is below standard range and she is appropriate for additional IVIG therapy. She is tolerating infusions well and IVIG infusions are effective in increasing Ig levels to an appropriate level to minimize patients risk of infection. Patient should continue on treatment as she has been per provider orders, without therapy her levels would drop below therapeutic range.    Plan: Patient is okay to proceed with infusions as planned per provider order through until her next Immunoglobulin lab draw, tentatively scheduled for 11/22/2023.     Next Review Needed: 11/2023    Lynsey Narvaez, PharmD, IgCP  Medication Access Pharmacist

## 2023-10-30 ENCOUNTER — OFFICE VISIT (OUTPATIENT)
Dept: ORTHOPEDICS | Facility: CLINIC | Age: 83
End: 2023-10-30
Payer: MEDICARE

## 2023-10-30 ENCOUNTER — ANCILLARY PROCEDURE (OUTPATIENT)
Dept: GENERAL RADIOLOGY | Facility: CLINIC | Age: 83
End: 2023-10-30
Attending: STUDENT IN AN ORGANIZED HEALTH CARE EDUCATION/TRAINING PROGRAM
Payer: MEDICARE

## 2023-10-30 VITALS — BODY MASS INDEX: 19.5 KG/M2 | WEIGHT: 144 LBS | HEIGHT: 72 IN

## 2023-10-30 DIAGNOSIS — M25.512 BILATERAL SHOULDER PAIN: ICD-10-CM

## 2023-10-30 DIAGNOSIS — M25.511 BILATERAL SHOULDER PAIN: ICD-10-CM

## 2023-10-30 DIAGNOSIS — G89.29 CHRONIC PAIN OF LEFT KNEE: Primary | ICD-10-CM

## 2023-10-30 DIAGNOSIS — M25.562 CHRONIC PAIN OF LEFT KNEE: Primary | ICD-10-CM

## 2023-10-30 PROCEDURE — 73030 X-RAY EXAM OF SHOULDER: CPT | Mod: TC | Performed by: RADIOLOGY

## 2023-10-30 PROCEDURE — 20610 DRAIN/INJ JOINT/BURSA W/O US: CPT | Mod: 50 | Performed by: STUDENT IN AN ORGANIZED HEALTH CARE EDUCATION/TRAINING PROGRAM

## 2023-10-30 PROCEDURE — 99213 OFFICE O/P EST LOW 20 MIN: CPT | Mod: 25 | Performed by: STUDENT IN AN ORGANIZED HEALTH CARE EDUCATION/TRAINING PROGRAM

## 2023-10-30 RX ORDER — LIDOCAINE HYDROCHLORIDE 10 MG/ML
2 INJECTION, SOLUTION INFILTRATION; PERINEURAL
Status: DISCONTINUED | OUTPATIENT
Start: 2023-10-30 | End: 2024-02-15

## 2023-10-30 RX ORDER — TRIAMCINOLONE ACETONIDE 40 MG/ML
40 INJECTION, SUSPENSION INTRA-ARTICULAR; INTRAMUSCULAR
Status: DISCONTINUED | OUTPATIENT
Start: 2023-10-30 | End: 2024-02-15

## 2023-10-30 RX ORDER — LIDOCAINE HYDROCHLORIDE 10 MG/ML
5 INJECTION, SOLUTION INFILTRATION; PERINEURAL
Status: DISCONTINUED | OUTPATIENT
Start: 2023-10-30 | End: 2024-02-15

## 2023-10-30 RX ADMIN — LIDOCAINE HYDROCHLORIDE 2 ML: 10 INJECTION, SOLUTION INFILTRATION; PERINEURAL at 15:15

## 2023-10-30 RX ADMIN — LIDOCAINE HYDROCHLORIDE 5 ML: 10 INJECTION, SOLUTION INFILTRATION; PERINEURAL at 15:15

## 2023-10-30 RX ADMIN — TRIAMCINOLONE ACETONIDE 40 MG: 40 INJECTION, SUSPENSION INTRA-ARTICULAR; INTRAMUSCULAR at 15:15

## 2023-10-30 NOTE — LETTER
10/30/2023         RE: Tricia Sosa  [address]  [facility] [address]        Dear Colleague,    Thank you for referring your patient, Tricia Sosa, to the [facility] [place]. Please see a copy of my visit note below.    CC: Bilateral knee pain    HPI: Patient is a 83 year old, female seen today in consultation for bilateral knee pain L>R.  She has had occasional knee pain in both knees.  This tends to be more so at night, when getting from a seated to a standing position, and when walking long distances.  This has been going on for several years.  She has previously seen my former colleague, Dr Castillo, who performed bilateral corticosteroid injections into her knees.  She states that this did help with her symptoms for quite some time.  More recently she has noted more frequent pain.  She localizes the pain to the back of her kneecaps.  She takes Tylenol as needed when she does have pain.  She does physical therapy for her back but not formally for her knees at this time.  She is never had a shoulder on her knees.  She has tried a hinged range of motion brace in the past which has not helped her symptoms.    Additionally she is having some soreness in her bilateral shoulders.  She states this occurs mostly at night.  She localizes over the lateral aspect of her shoulders    Patient is currently working retired.  She has a past medical history significant for CVID.  She does not smoke.  She enjoys walking and her physical therapy exercises for exercise         Patient Active Problem List   Diagnosis     Lymphocytic colitis     Acquired hypothyroidism     CVID (common variable immunodeficiency) (H)     B12 deficiency     CARDIOVASCULAR SCREENING; LDL GOAL LESS THAN 130     Other autoimmune hemolytic anemia (H)     Right-sided low back pain with right-sided sciatica, unspecified chronicity     Autoimmune hemolytic anemia (H)     Ulcerative colitis (H)     Bronchiectasis  (H)     Mild obstructive sleep apnea     Selective deficiency of IgG subclasses (H)     Anemia, iron deficiency          Past Medical History:   Diagnosis Date     WARD (dyspnea on exertion)      External hemorrhoids without mention of complication 6/27/05     Hemolytic anemia (H24)     autoimmune     Hypothyroidism      IgA deficiency (H)      Myopia of both eyes with astigmatism and presbyopia 8/16/2017     Other malaise and fatigue      Other severe protein-calorie malnutrition      Other vitreous opacities 12/19/2006     Thyroid disease      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    Hemorrhage Subdural Trauma Without LOC Active     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, and posterior vitreous detachment     Urinary tract infection, site not specified     Recurrent UTI's          Past Surgical History:   Procedure Laterality Date     COLONOSCOPY N/A 4/26/2016    Procedure: COLONOSCOPY;  Surgeon: Dontae Del Rio MD;  Location:  GI     ENT SURGERY  1985    Thyroid lobectomy     EYE SURGERY Bilateral 04/20/2021    Cataract Surgery     HC CYSTOSCOPY,INSERT URETERAL STENT      Ureteral stent insertion     HC FLEX SIGMOIDOSCOPY W BIOPSY  5/30/00      LAPAROSCOPY, SURGICAL; CHOLECYSTECTOMY  1992      THYROIDECTOMY       LIGATION OF HEMORRHOID(S)      Hemorrhoidectomy     UPPER GI ENDOSCOPY,BIOPSY  10/01/01     Z LIGATE FALLOPIAN TUBE       ZZ COLONOSCOPY W BIOPSY  4/26/00     ZZHC COLONOSCOPY W BIOPSY  10/8/03     ZZ COLONOSCOPY W BIOPSY  6/27/05    REPEAT IN 5 YEARS          Current Outpatient Medications   Medication Sig Dispense Refill     cyanocobalamin (VITAMIN  B-12) 1000 MCG tablet   3     folic acid (FOLVITE) 1 MG tablet   3     ketoconazole (NIZORAL) 2 % external shampoo Use every other day to scalp as needed 120 mL 0     levothyroxine (SYNTHROID/LEVOTHROID) 150 MCG tablet Take one tablet  by mouth daily 90 tablet 2     triamcinolone (KENALOG) 0.1  % external ointment Apply topically 2 times daily            Allergies   Allergen Reactions     Doxycycline           Family History   Problem Relation Age of Onset     Colon Cancer Mother 90     Cerebrovascular Disease Father          at age 85     Dementia Brother      Prostate Cancer Brother      Thyroid Disease Brother      Dementia Brother      Thyroid Disease Brother      Pancreatic Cancer Brother      Breast Cancer Sister      Hyperlipidemia Sister      Depression Sister      Anxiety Disorder Sister      Thyroid Disease Sister      Breast Cancer Sister      Colon Cancer Niece      Other Cancer Niece         leukemia          Social History     Tobacco Use     Smoking status: Never     Smokeless tobacco: Never   Substance Use Topics     Alcohol use: Yes     Alcohol/week: 0.0 standard drinks of alcohol     Comment: 1 a day at most            Objective:  Physical Exam:  B/L LE: No pain with axial load or logroll of the hip.  No open wounds, lacerations, or prior surgical incisions about the knee.  No significant edema or ecchymosis.  No erythema or drainage.  No effusion of the knee joint.  No pain to palpation over the quad tendon patella or patellar tendon.  No pain to palpation over the medial or lateral joint line.  No pain to palpation over the femoral origin or tibial insertion of the MCL.  No pain to palpation over the femoral origin or fibular insertion of the LCL.  Patella grind test is positive for significant crepitus, but not fausto pain passive range of motion 0 to 150 degrees of knee flexion.  Active range of motion is equivalent to passive range of motion.  5/5 strength in flexion and extension.  Stable to varus and valgus stress at 0 and 30 degrees of knee flexion with firm endpoint.  Negative Lachman.  Stable anterior posterior drawer.  Grossly neurovascular intact.    Imaging:  3 view x-rays of right knee from 2022 and left knee from 2022 was reviewed.  This shows no loss  of joint space between the medial and lateral compartment.  However there is significant loss of joint space in the patellofemoral compartment both knees with complete loss of the space between the lateral facet and lateral trochlea with bone-on-bone arthritis and the lateral bone spur formation of the patella.  There is bone loss superior to the trochlear groove on lateral view as well as some bone loss from the lateral patella.    Bilateral 4 view shoulder x-rays from today were reviewed.  This shows fairly well-maintained glenohumeral joint space without high riding humerus.  There is mild AC joint arthrosis and bilateral shoulders.      Procedure:   Written informed consent for Bilateral knee intra-articular injection was obtained from the patient after discussing the risk and benefits of the procedure.  Risk and benefits including but not limited to bleeding, infection, failure to cure pain and allergic reaction were discussed.  The anterior inferolateral portal was identified by palpation of bony landmarks.  The skin was cleaned with iodine solution.  Under sterile technique the anterior inferolateral portal was utilized to inject the entirety of the steroid solution.  Soft dressings were applied.  Patient tolerated the procedure without difficulty.   This was repeated on the contralateral side. I counseled the patient on signs and symptoms of allergic reaction and infection.     Large Joint Injection/Arthocentesis: bilateral knee    Date/Time: 10/30/2023 3:15 PM    Performed by: Rashid Cardoso MD  Authorized by: Rashid Cardoso MD    Needle Size:  22 G  Guidance: landmark guided    Approach:  Anterolateral  Location:  Knee  Laterality:  Bilateral      Medications (Right):  40 mg triamcinolone 40 MG/ML; 5 mL lidocaine 1 %; 2 mL lidocaine 1 %  Medications (Left):  40 mg triamcinolone 40 MG/ML; 5 mL lidocaine 1 %; 2 mL lidocaine 1 %  Outcome:  Tolerated well, no immediate complications  Procedure discussed:  discussed risks, benefits, and alternatives    Consent Given by:  Patient  Timeout: timeout called immediately prior to procedure    Prep: patient was prepped and draped in usual sterile fashion      Assessment and Plan: Patient is an 83-year-old female with bilateral end-stage patellofemoral joint arthritis.  In addition she has been having some soreness in her shoulders.  X-ray did not show any signs of glenohumeral arthritis or rotator cuff arthropathy.  This is likely due to rotator cuff deconditioning or tendinosis.  I long discussion with her today about her knee arthritis.  I reviewed her images with her today.  We reviewed the diagnosis, natural history, and prognosis.  We discussed both operative and nonoperative treatment options.  We discussed nonoperative management in the form of low impact aerobic exercise, weight loss, oral anti-inflammatory pain medication, bracing, physical therapy, and intra-articular corticosteroid and hyaluronic acid injections.  We also discussed operative intervention in the form of total knee arthroplasty or patellofemoral arthroplasty.  We reviewed the risks and benefits including but not limited to bleeding, infection requiring revision surgery, loss of range of motion, loss of function, damage surrounding nerves and blood vessels, failure to cure pain, loss of limb and loss of life.  We also discussed the expected postoperative course after a total knee arthroplasty.  I had the opportunity to answer her questions at this time.  At this point she would like to trial another round of corticosteroid injections.  I think that is totally reasonable.  These were performed as described above in addition I will add some orders for her physical therapy for shoulder range of motion and cuff strengthening as well as knee range of motion with quad, hamstring, glutes, core strengthening.    Rashid Cardoso MD  Orthopedic Surgery      Again, thank you for allowing me to participate in  the care of your patient.        Sincerely,        Rashid Cardoso MD

## 2023-10-30 NOTE — PATIENT INSTRUCTIONS
[facility]   [id number] [place] [address], [address]  [place] [address] [address] [place] [address]   Appointments: [phone] Appointments: [phone]   Fax: [phone] Fax: [phone]       1. Bilateral shoulder pain    2. Chronic pain of left knee        PHYSICAL THERAPY:  Physical Therapy orders have been placed with [facility].  You can call [phone] to schedule at your convenience.       Follow up with Dr. Cardoso as needed .    Call my office with any questions or concerns, [phone].

## 2023-10-30 NOTE — PROGRESS NOTES
CC: Bilateral knee pain    HPI: Patient is a 83 year old, female seen today in consultation for bilateral knee pain L>R.  She has had occasional knee pain in both knees.  This tends to be more so at night, when getting from a seated to a standing position, and when walking long distances.  This has been going on for several years.  She has previously seen my former colleague, Dr Castillo, who performed bilateral corticosteroid injections into her knees.  She states that this did help with her symptoms for quite some time.  More recently she has noted more frequent pain.  She localizes the pain to the back of her kneecaps.  She takes Tylenol as needed when she does have pain.  She does physical therapy for her back but not formally for her knees at this time.  She is never had a shoulder on her knees.  She has tried a hinged range of motion brace in the past which has not helped her symptoms.    Additionally she is having some soreness in her bilateral shoulders.  She states this occurs mostly at night.  She localizes over the lateral aspect of her shoulders    Patient is currently working retired.  She has a past medical history significant for CVID.  She does not smoke.  She enjoys walking and her physical therapy exercises for exercise         Patient Active Problem List   Diagnosis    Lymphocytic colitis    Acquired hypothyroidism    CVID (common variable immunodeficiency) (H)    B12 deficiency    CARDIOVASCULAR SCREENING; LDL GOAL LESS THAN 130    Other autoimmune hemolytic anemia (H)    Right-sided low back pain with right-sided sciatica, unspecified chronicity    Autoimmune hemolytic anemia (H)    Ulcerative colitis (H)    Bronchiectasis (H)    Mild obstructive sleep apnea    Selective deficiency of IgG subclasses (H)    Anemia, iron deficiency          Past Medical History:   Diagnosis Date    WARD (dyspnea on exertion)     External hemorrhoids without mention of complication 6/27/05    Hemolytic anemia (H24)      autoimmune    Hypothyroidism     IgA deficiency (H)     Myopia of both eyes with astigmatism and presbyopia 2017    Other malaise and fatigue     Other severe protein-calorie malnutrition     Other vitreous opacities 2006    Thyroid disease     Traumatic intracranial subdural hematoma (H) 2014    Hemorrhage Subdural Trauma Without LOC Active    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, and posterior vitreous detachment    Urinary tract infection, site not specified     Recurrent UTI's          Past Surgical History:   Procedure Laterality Date    COLONOSCOPY N/A 2016    Procedure: COLONOSCOPY;  Surgeon: Dontae Del Rio MD;  Location:  GI    ENT SURGERY      Thyroid lobectomy    EYE SURGERY Bilateral 2021    Cataract Surgery     CYSTOSCOPY,INSERT URETERAL STENT      Ureteral stent insertion     FLEX SIGMOIDOSCOPY W BIOPSY  00     LAPAROSCOPY, SURGICAL; CHOLECYSTECTOMY       THYROIDECTOMY      LIGATION OF HEMORRHOID(S)      Hemorrhoidectomy    UPPER GI ENDOSCOPY,BIOPSY  10/01/01    ZZC LIGATE FALLOPIAN TUBE      ZZ COLONOSCOPY W BIOPSY  00    ZZ COLONOSCOPY W BIOPSY  10/8/03    ZZ COLONOSCOPY W BIOPSY  05    REPEAT IN 5 YEARS          Current Outpatient Medications   Medication Sig Dispense Refill    cyanocobalamin (VITAMIN  B-12) 1000 MCG tablet   3    folic acid (FOLVITE) 1 MG tablet   3    ketoconazole (NIZORAL) 2 % external shampoo Use every other day to scalp as needed 120 mL 0    levothyroxine (SYNTHROID/LEVOTHROID) 150 MCG tablet Take one tablet  by mouth daily 90 tablet 2    triamcinolone (KENALOG) 0.1 % external ointment Apply topically 2 times daily            Allergies   Allergen Reactions    Doxycycline           Family History   Problem Relation Age of Onset    Colon Cancer Mother 90    Cerebrovascular Disease Father          at age 85    Dementia Brother     Prostate Cancer Brother     Thyroid Disease  Brother     Dementia Brother     Thyroid Disease Brother     Pancreatic Cancer Brother     Breast Cancer Sister     Hyperlipidemia Sister     Depression Sister     Anxiety Disorder Sister     Thyroid Disease Sister     Breast Cancer Sister     Colon Cancer Niece     Other Cancer Niece         leukemia          Social History     Tobacco Use    Smoking status: Never    Smokeless tobacco: Never   Substance Use Topics    Alcohol use: Yes     Alcohol/week: 0.0 standard drinks of alcohol     Comment: 1 a day at most            Objective:  Physical Exam:  B/L LE: No pain with axial load or logroll of the hip.  No open wounds, lacerations, or prior surgical incisions about the knee.  No significant edema or ecchymosis.  No erythema or drainage.  No effusion of the knee joint.  No pain to palpation over the quad tendon patella or patellar tendon.  No pain to palpation over the medial or lateral joint line.  No pain to palpation over the femoral origin or tibial insertion of the MCL.  No pain to palpation over the femoral origin or fibular insertion of the LCL.  Patella grind test is positive for significant crepitus, but not fausto pain passive range of motion 0 to 150 degrees of knee flexion.  Active range of motion is equivalent to passive range of motion.  5/5 strength in flexion and extension.  Stable to varus and valgus stress at 0 and 30 degrees of knee flexion with firm endpoint.  Negative Lachman.  Stable anterior posterior drawer.  Grossly neurovascular intact.    Imaging:  3 view x-rays of right knee from September 2022 and left knee from October 2022 was reviewed.  This shows no loss of joint space between the medial and lateral compartment.  However there is significant loss of joint space in the patellofemoral compartment both knees with complete loss of the space between the lateral facet and lateral trochlea with bone-on-bone arthritis and the lateral bone spur formation of the patella.  There is bone loss  superior to the trochlear groove on lateral view as well as some bone loss from the lateral patella.    Bilateral 4 view shoulder x-rays from today were reviewed.  This shows fairly well-maintained glenohumeral joint space without high riding humerus.  There is mild AC joint arthrosis and bilateral shoulders.      Procedure:   Written informed consent for Bilateral knee intra-articular injection was obtained from the patient after discussing the risk and benefits of the procedure.  Risk and benefits including but not limited to bleeding, infection, failure to cure pain and allergic reaction were discussed.  The anterior inferolateral portal was identified by palpation of bony landmarks.  The skin was cleaned with iodine solution.  Under sterile technique the anterior inferolateral portal was utilized to inject the entirety of the steroid solution.  Soft dressings were applied.  Patient tolerated the procedure without difficulty.   This was repeated on the contralateral side. I counseled the patient on signs and symptoms of allergic reaction and infection.     Large Joint Injection/Arthocentesis: bilateral knee    Date/Time: 10/30/2023 3:15 PM    Performed by: Rashid Cardoso MD  Authorized by: Rashid Cardoso MD    Needle Size:  22 G  Guidance: landmark guided    Approach:  Anterolateral  Location:  Knee  Laterality:  Bilateral      Medications (Right):  40 mg triamcinolone 40 MG/ML; 5 mL lidocaine 1 %; 2 mL lidocaine 1 %  Medications (Left):  40 mg triamcinolone 40 MG/ML; 5 mL lidocaine 1 %; 2 mL lidocaine 1 %  Outcome:  Tolerated well, no immediate complications  Procedure discussed: discussed risks, benefits, and alternatives    Consent Given by:  Patient  Timeout: timeout called immediately prior to procedure    Prep: patient was prepped and draped in usual sterile fashion      Assessment and Plan: Patient is an 83-year-old female with bilateral end-stage patellofemoral joint arthritis.  In addition she has been  having some soreness in her shoulders.  X-ray did not show any signs of glenohumeral arthritis or rotator cuff arthropathy.  This is likely due to rotator cuff deconditioning or tendinosis.  I long discussion with her today about her knee arthritis.  I reviewed her images with her today.  We reviewed the diagnosis, natural history, and prognosis.  We discussed both operative and nonoperative treatment options.  We discussed nonoperative management in the form of low impact aerobic exercise, weight loss, oral anti-inflammatory pain medication, bracing, physical therapy, and intra-articular corticosteroid and hyaluronic acid injections.  We also discussed operative intervention in the form of total knee arthroplasty or patellofemoral arthroplasty.  We reviewed the risks and benefits including but not limited to bleeding, infection requiring revision surgery, loss of range of motion, loss of function, damage surrounding nerves and blood vessels, failure to cure pain, loss of limb and loss of life.  We also discussed the expected postoperative course after a total knee arthroplasty.  I had the opportunity to answer her questions at this time.  At this point she would like to trial another round of corticosteroid injections.  I think that is totally reasonable.  These were performed as described above in addition I will add some orders for her physical therapy for shoulder range of motion and cuff strengthening as well as knee range of motion with quad, hamstring, glutes, core strengthening.    Rashid Cardoso MD  Orthopedic Surgery

## 2023-11-02 ENCOUNTER — THERAPY VISIT (OUTPATIENT)
Dept: PHYSICAL THERAPY | Facility: CLINIC | Age: 83
End: 2023-11-02
Attending: INTERNAL MEDICINE
Payer: MEDICARE

## 2023-11-02 DIAGNOSIS — M62.81 GENERALIZED MUSCLE WEAKNESS: Primary | ICD-10-CM

## 2023-11-02 DIAGNOSIS — R53.83 OTHER FATIGUE: ICD-10-CM

## 2023-11-02 PROCEDURE — 97110 THERAPEUTIC EXERCISES: CPT | Mod: GP | Performed by: PHYSICAL THERAPIST

## 2023-11-02 PROCEDURE — 97140 MANUAL THERAPY 1/> REGIONS: CPT | Mod: GP | Performed by: PHYSICAL THERAPIST

## 2023-11-03 ENCOUNTER — TRANSCRIBE ORDERS (OUTPATIENT)
Dept: OTHER | Age: 83
End: 2023-11-03

## 2023-11-06 ENCOUNTER — PRE VISIT (OUTPATIENT)
Dept: INFECTIOUS DISEASES | Facility: CLINIC | Age: 83
End: 2023-11-06
Payer: MEDICARE

## 2023-11-06 ENCOUNTER — OFFICE VISIT (OUTPATIENT)
Dept: INFECTIOUS DISEASES | Facility: CLINIC | Age: 83
End: 2023-11-06
Attending: INTERNAL MEDICINE
Payer: MEDICARE

## 2023-11-06 VITALS
SYSTOLIC BLOOD PRESSURE: 148 MMHG | BODY MASS INDEX: 19.23 KG/M2 | DIASTOLIC BLOOD PRESSURE: 77 MMHG | TEMPERATURE: 97.9 F | WEIGHT: 141.8 LBS | OXYGEN SATURATION: 97 % | HEART RATE: 80 BPM

## 2023-11-06 DIAGNOSIS — K51.90 ULCERATIVE COLITIS WITHOUT COMPLICATIONS, UNSPECIFIED LOCATION (H): ICD-10-CM

## 2023-11-06 DIAGNOSIS — D72.818 LOW CD4 CELL COUNT DETERMINED BY FLOW CYTOMETRY: ICD-10-CM

## 2023-11-06 DIAGNOSIS — E44.1 MILD PROTEIN-CALORIE MALNUTRITION (H): ICD-10-CM

## 2023-11-06 DIAGNOSIS — Z00.00 HEALTHCARE MAINTENANCE: Primary | ICD-10-CM

## 2023-11-06 DIAGNOSIS — J47.9 BRONCHIECTASIS WITHOUT COMPLICATION (H): ICD-10-CM

## 2023-11-06 DIAGNOSIS — D83.9 CVID (COMMON VARIABLE IMMUNODEFICIENCY) (H): ICD-10-CM

## 2023-11-06 PROCEDURE — 99204 OFFICE O/P NEW MOD 45 MIN: CPT | Performed by: INTERNAL MEDICINE

## 2023-11-06 PROCEDURE — G0463 HOSPITAL OUTPT CLINIC VISIT: HCPCS | Performed by: INTERNAL MEDICINE

## 2023-11-06 ASSESSMENT — PAIN SCALES - GENERAL: PAINLEVEL: NO PAIN (0)

## 2023-11-06 NOTE — NURSING NOTE
Chief Complaint   Patient presents with    Consult       BP (!) 148/77   Pulse 80   Temp 97.9  F (36.6  C) (Oral)   Wt 64.3 kg (141 lb 12.8 oz)   LMP  (LMP Unknown)   SpO2 97%   BMI 19.23 kg/m      Vinh Cotton on 11/6/2023 at 2:20 PM

## 2023-11-06 NOTE — LETTER
11/6/2023       RE: Tricia Sosa  [id number]  [facility] [address]     Dear Colleague,    Thank you for referring your patient, Tricia Sosa, to the [facility] INFECTIOUS DISEASE CLINIC [place] at [facility]. Please see a copy of my visit note below.    [facility]  General Infectious Disease Clinic Note:  New Patient     Patient:  Tricia Sosa, Date of birth 1940   Medical record number [id number]  Date of Visit:  11/06/2023  Consult requested by Isaiah Man PA for evaluation of Low CD4 cell count determined by flow cytometry CVID (common variable immunodeficiency).     ASSESSMENT AND PLAN     Tricia Sosa is a 83 year old female with complicated immunodeficiency including being on rituximab. Her CD4 count is higher than 200 and she is not on steroids. We had a prolonged discussion that we do not have great data supporting primary prophylaxis for infections especially PJP in her case.     Regarding her neuro symptoms, I told her that there's no evidence this is due to infection at this moment and she would follow up with her neurologist.     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   GI referral if she has recurrence of vomiting.   Primary care referral.     45 minutes spent by me on the date of the encounter doing chart review, history and exam, documentation and further activities per the note    SANKET MARION MD    Infectious Diseases  Contact me on [employer]ell or Find my pager on Loop App.     SUBJECTIVE       History of Present lllness:    Tricia Sosa is a 83 year old female who had IgA deficiency around 2000 when she fell severely ill with intense diarrhea and lost 40 pounds. Her CD4  count was critically low at 48, and she was diagnosed with CMV colitis. She has been under the care of the hematology clinic at [place] since then. Her chronic anemia, present since at least 2004, was determined to be due to her underlying condition after a bone marrow biopsy showed a hypercellular marrow with normal hematopoiesis and no signs of myelodysplastic syndromes (MDS) or lymphoproliferation.    In 2002, further investigation into her anemia showed no evidence of hemolysis or deficiencies in iron, B12, or folate, so no bone marrow biopsy was conducted. However, by the summer of 2015, her anemia had worsened, indicated by fatigue, shortness of breath, and a drop in hemoglobin to 9. Tests confirmed hemolytic anemia, with high LDH levels, undetectable haptoglobin, and elevated bilirubin. She tested positive for IgG and complement on the direct antiglobulin test (MARIKA) and had a low cold agglutinin titer. Treatment with prednisone and supplements began on July 31, 2015, with prednisone being gradually tapered off by January 2016. Her condition was stable when last seen at [facility] on March 10, 2016.    Under the care of Dr. Matos and Dr. Summers, she experienced a relapse and underwent another course of prednisone and four weekly doses of rituximab, which were completed by September 19, 2019.    She has been on rituximab since then. Her last steroid intake was 2022. She had been on TMP-SMX for 18 years before but stopped for 2 years now.     She mentioned that in Aug, Oct, Nov, she vomited at night. She did not have other symptoms.       OBJECTIVE     BP (!) 148/77   Pulse 80   Temp 97.9  F (36.6  C) (Oral)   Wt 64.3 kg (141 lb 12.8 oz)   LMP  (LMP Unknown)   SpO2 97%   BMI 19.23 kg/m      Wt Readings from Last 4 Encounters:   10/30/23 65.3 kg (144 lb)   10/18/23 65.5 kg (144 lb 6.4 oz)   10/17/23 66.2 kg (145 lb 14.4 oz)   09/29/23 66.2 kg (145 lb 14.4 oz)     Physical Exam:  GENERAL: well-developed,  well-nourished, alert, oriented, in no acute distress.  LUNGS: Clear to auscultation.  CARDIOVASCULAR: Regular rate and rhythm with no murmurs  NEUROLOGIC: Muscle strength intact. Unstable on her feet      Laboratory Data:    CMP unremarkable  WBC normal     CD4 count 392     IgG 414      SANKET MARION MD

## 2023-11-06 NOTE — PROGRESS NOTES
[facility]  General Infectious Disease Clinic Note:  New Patient     Patient:  Tricia Sosa, Date of birth 1940   Medical record number [id number]  Date of Visit:  11/06/2023  Consult requested by Isaiah Man PA for evaluation of Low CD4 cell count determined by flow cytometry CVID (common variable immunodeficiency).     ASSESSMENT AND PLAN     Tricia Sosa is a 83 year old female with complicated immunodeficiency including being on rituximab. Her CD4 count is higher than 200 and she is not on steroids. We had a prolonged discussion that we do not have great data supporting primary prophylaxis for infections especially PJP in her case.     Regarding her neuro symptoms, I told her that there's no evidence this is due to infection at this moment and she would follow up with her neurologist.     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   GI referral if she has recurrence of vomiting.   Primary care referral.     45 minutes spent by me on the date of the encounter doing chart review, history and exam, documentation and further activities per the note    SANKET MARION MD    Infectious Diseases  Contact me on easyfolio Randell or Find my pager on CicerOOs.     SUBJECTIVE       History of Present lllness:    Tricia Sosa is a 83 year old female who had IgA deficiency around 2000 when she fell severely ill with intense diarrhea and lost 40 pounds. Her CD4 count was critically low at 48, and she was diagnosed with CMV colitis. She has been under the care of the hematology clinic at [place] since then. Her chronic anemia, present since at least 2004, was determined to be due to her underlying condition after a bone marrow biopsy showed a hypercellular marrow with normal  hematopoiesis and no signs of myelodysplastic syndromes (MDS) or lymphoproliferation.    In 2002, further investigation into her anemia showed no evidence of hemolysis or deficiencies in iron, B12, or folate, so no bone marrow biopsy was conducted. However, by the summer of 2015, her anemia had worsened, indicated by fatigue, shortness of breath, and a drop in hemoglobin to 9. Tests confirmed hemolytic anemia, with high LDH levels, undetectable haptoglobin, and elevated bilirubin. She tested positive for IgG and complement on the direct antiglobulin test (MARIKA) and had a low cold agglutinin titer. Treatment with prednisone and supplements began on July 31, 2015, with prednisone being gradually tapered off by January 2016. Her condition was stable when last seen at [facility] on March 10, 2016.    Under the care of Dr. Matos and Dr. Summers, she experienced a relapse and underwent another course of prednisone and four weekly doses of rituximab, which were completed by September 19, 2019.    She has been on rituximab since then. Her last steroid intake was 2022. She had been on TMP-SMX for 18 years before but stopped for 2 years now.     She mentioned that in Aug, Oct, Nov, she vomited at night. She did not have other symptoms.       OBJECTIVE     BP (!) 148/77   Pulse 80   Temp 97.9  F (36.6  C) (Oral)   Wt 64.3 kg (141 lb 12.8 oz)   LMP  (LMP Unknown)   SpO2 97%   BMI 19.23 kg/m      Wt Readings from Last 4 Encounters:   10/30/23 65.3 kg (144 lb)   10/18/23 65.5 kg (144 lb 6.4 oz)   10/17/23 66.2 kg (145 lb 14.4 oz)   09/29/23 66.2 kg (145 lb 14.4 oz)     Physical Exam:  GENERAL: well-developed, well-nourished, alert, oriented, in no acute distress.  LUNGS: Clear to auscultation.  CARDIOVASCULAR: Regular rate and rhythm with no murmurs  NEUROLOGIC: Muscle strength intact. Unstable on her feet      Laboratory Data:    CMP unremarkable  WBC normal     CD4 count 392     IgG 414

## 2023-11-07 ENCOUNTER — TELEPHONE (OUTPATIENT)
Dept: INFECTIOUS DISEASES | Facility: CLINIC | Age: 83
End: 2023-11-07
Payer: MEDICARE

## 2023-11-07 ENCOUNTER — TELEPHONE (OUTPATIENT)
Dept: INTERNAL MEDICINE | Facility: CLINIC | Age: 83
End: 2023-11-07
Payer: MEDICARE

## 2023-11-14 ENCOUNTER — VIRTUAL VISIT (OUTPATIENT)
Dept: ONCOLOGY | Facility: CLINIC | Age: 83
End: 2023-11-14
Attending: STUDENT IN AN ORGANIZED HEALTH CARE EDUCATION/TRAINING PROGRAM
Payer: MEDICARE

## 2023-11-14 VITALS — HEIGHT: 72 IN | WEIGHT: 140 LBS | BODY MASS INDEX: 18.96 KG/M2

## 2023-11-14 DIAGNOSIS — R26.89 BALANCE PROBLEMS: ICD-10-CM

## 2023-11-14 DIAGNOSIS — M62.81 MUSCLE WEAKNESS (GENERALIZED): ICD-10-CM

## 2023-11-14 DIAGNOSIS — D83.9 CVID (COMMON VARIABLE IMMUNODEFICIENCY) (H): ICD-10-CM

## 2023-11-14 DIAGNOSIS — D59.10 AUTOIMMUNE HEMOLYTIC ANEMIA (H): ICD-10-CM

## 2023-11-14 DIAGNOSIS — D59.19 OTHER AUTOIMMUNE HEMOLYTIC ANEMIA (H): Primary | ICD-10-CM

## 2023-11-14 PROCEDURE — 99215 OFFICE O/P EST HI 40 MIN: CPT | Mod: VID | Performed by: STUDENT IN AN ORGANIZED HEALTH CARE EDUCATION/TRAINING PROGRAM

## 2023-11-14 ASSESSMENT — PAIN SCALES - GENERAL: PAINLEVEL: NO PAIN (0)

## 2023-11-14 NOTE — LETTER
11/14/2023         RE: Tricia Sosa  [address]  [facility] [address]        Dear Colleague,    Thank you for referring your patient, Tricia Sosa, to the [facility]. Please see a copy of my visit note below.    Virtual Visit Details    Type of service:  Video Visit     Originating Location (pt. Location): Home    Distant Location (provider location):  On-site  Platform used for Video Visit: [facility]   PM&R clinic note        Interval history:     Tricia Sosa presents to clinic today for follow up reg her rehab needs.   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   Was last seen in clinic on 5/16/23.  Recommendations included:  Therapy/equipment/braces:  Outpatient physical therapy referral placed for strength and balance.  Start 1-2 protein shakes daily.  Can try either Premier protein shakes or Nestlé fair life core power shakes.  Aiming for an extra 30 to 60 g of protein daily.  Obtain soft back brace as planned for support with activities.  Referral / follow up with other providers:  Follow-up with neurology as planned for further evaluation and management of balance difficulties.  Follow up: 6 to 8 months.      Symptoms,  Tricia was seen for a return virtual visit today.  Overall, she feels like her symptoms and strength have remained relatively stable since her last visit.  She has had significant knee OA pain, and had CS injections done by orthopedic surgery for her knees which she feels did not help.  She has not followed up with them yet to determine other management options, and indicates that no other procedural options were discussed with her at that visit.  She states that the pain has not terrible and that it does not significantly affect her activities, however it is increased at night and she notices it when doing certain activities and maneuvers.  She has  continued outpatient physical therapy, and feels that this has not really been helping her with her activity and strength overall.  She continues to try to keep her daily home exercise regimen consistent.  She has some difficulties with exercises on the floor as she does not feel stable when she attempts to get up.        Therapies/HEP,  Continues with outpatient physical therapy.      Functionally,   No changes.      Social history is unchanged.      Medications:  Current Outpatient Medications   Medication Sig Dispense Refill     cyanocobalamin (VITAMIN  B-12) 1000 MCG tablet   3     folic acid (FOLVITE) 1 MG tablet   3     ketoconazole (NIZORAL) 2 % external shampoo Use every other day to scalp as needed 120 mL 0     levothyroxine (SYNTHROID/LEVOTHROID) 150 MCG tablet Take one tablet  by mouth daily 90 tablet 2     triamcinolone (KENALOG) 0.1 % external ointment Apply topically 2 times daily                Physical Exam:   Ht 1.829 m (6')   Wt 63.5 kg (140 lb)   LMP  (LMP Unknown)   BMI 18.99 kg/m    Gen: NAD, pleasant and cooperative   HEENT: Atraumatic, normocephalic, extraocular movements appear intact  Pulm: non-labored breathing in room air  Neuro/MSK:   Orientation: Oriented to person, time, place and situation, Exhibits good insight into her condition and ongoing treatment  Motor: Observed moving upper extremities actively against gravity    Labs/Imaging:  Lab Results   Component Value Date    WBC 4.8 10/17/2023    HGB 11.7 10/17/2023    HCT 34.0 (L) 10/17/2023     10/17/2023     (L) 10/17/2023     Lab Results   Component Value Date     10/17/2023    POTASSIUM 4.3 10/17/2023    CHLORIDE 101 10/17/2023    CO2 28 10/17/2023     (H) 10/17/2023     Lab Results   Component Value Date    GFRESTIMATED 87 10/17/2023    GFRESTBLACK >90 06/29/2021     Lab Results   Component Value Date    AST 28 10/17/2023    ALT 14 10/17/2023    ALKPHOS 114 (H) 10/17/2023    BILITOTAL 0.5 10/17/2023     "BILIDIRECT 0.30 05/19/2000     No results found for: \"INR\"  Lab Results   Component Value Date    BUN 19.1 10/17/2023    CR 0.66 10/17/2023              Assessment/Plan   Tricia Sosa presents to clinic today for follow up reg her rehab needs.   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   Was last seen in clinic on 5/16/23.  Multiple rehabilitation considerations were discussed with Tricia at today's visit.  Overall, her knee pain is still what is bothering her the most.  She has not started PT as recommended by orthopedic surgery specifically for her knee OA, but plans on starting this soon.  She has had multiple appointments and felt overwhelmed with that, so delayed initiating this.  It was recommended that she continue with outpatient physical therapy.  We discussed that there are other possible injection options that might give her better pain relief for her knees, and she can discuss that with the orthopedic surgery team/sports medicine.  We will look into this further for her.  She should continue her home exercise regimen as tolerated. Plan to follow up in 6 months. Tricia is in agreement with this plan.      Therapy/equipment/braces:  Continue outpatient physical therapy for strengthening and work on balance.  Start Ortho PT when able to address knee OA symptoms.  Continue home exercise regimen as tolerated.  Medications:  Continue as needed Tylenol, and can try Voltaren gel 4 times daily for pain relief for her knees.  Interventions:  Continue to follow with orthopedic surgery and we will look into other options with sports medicine to see if there are other procedural options that could help improve her pain as CS injections did not work.  Follow up: 6 months.      Marley Heart MD  Physical Medicine & Rehabilitation      50 minutes spent on the date of the encounter doing chart review, history and exam, documentation and further activities as noted " above.               Again, thank you for allowing me to participate in the care of your patient.        Sincerely,        Marley Heart MD

## 2023-11-14 NOTE — PROGRESS NOTES
[facility]   PM&R clinic note        Interval history:     Tricia Sosa presents to clinic today for follow up reg her rehab needs.   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   Was last seen in clinic on 5/16/23.  Recommendations included:  Therapy/equipment/braces:  Outpatient physical therapy referral placed for strength and balance.  Start 1-2 protein shakes daily.  Can try either Premier protein shakes or Nestlé fair life core power shakes.  Aiming for an extra 30 to 60 g of protein daily.  Obtain soft back brace as planned for support with activities.  Referral / follow up with other providers:  Follow-up with neurology as planned for further evaluation and management of balance difficulties.  Follow up: 6 to 8 months.      Symptoms,  Tricia was seen for a return virtual visit today.  Overall, she feels like her symptoms and strength have remained relatively stable since her last visit.  She has had significant knee OA pain, and had CS injections done by orthopedic surgery for her knees which she feels did not help.  She has not followed up with them yet to determine other management options, and indicates that no other procedural options were discussed with her at that visit.  She states that the pain has not terrible and that it does not significantly affect her activities, however it is increased at night and she notices it when doing certain activities and maneuvers.  She has continued outpatient physical therapy, and feels that this has not really been helping her with her activity and strength overall.  She continues to try to keep her daily home exercise regimen consistent.  She has some difficulties with exercises on the floor as she does not feel stable when she attempts to get up.        Therapies/HEP,  Continues with outpatient physical therapy.      Functionally,   No changes.      Social history is  "unchanged.      Medications:  Current Outpatient Medications   Medication Sig Dispense Refill    cyanocobalamin (VITAMIN  B-12) 1000 MCG tablet   3    folic acid (FOLVITE) 1 MG tablet   3    ketoconazole (NIZORAL) 2 % external shampoo Use every other day to scalp as needed 120 mL 0    levothyroxine (SYNTHROID/LEVOTHROID) 150 MCG tablet Take one tablet  by mouth daily 90 tablet 2    triamcinolone (KENALOG) 0.1 % external ointment Apply topically 2 times daily                Physical Exam:   Ht 1.829 m (6')   Wt 63.5 kg (140 lb)   LMP  (LMP Unknown)   BMI 18.99 kg/m    Gen: NAD, pleasant and cooperative   HEENT: Atraumatic, normocephalic, extraocular movements appear intact  Pulm: non-labored breathing in room air  Neuro/MSK:   Orientation: Oriented to person, time, place and situation, Exhibits good insight into her condition and ongoing treatment  Motor: Observed moving upper extremities actively against gravity    Labs/Imaging:  Lab Results   Component Value Date    WBC 4.8 10/17/2023    HGB 11.7 10/17/2023    HCT 34.0 (L) 10/17/2023     10/17/2023     (L) 10/17/2023     Lab Results   Component Value Date     10/17/2023    POTASSIUM 4.3 10/17/2023    CHLORIDE 101 10/17/2023    CO2 28 10/17/2023     (H) 10/17/2023     Lab Results   Component Value Date    GFRESTIMATED 87 10/17/2023    GFRESTBLACK >90 06/29/2021     Lab Results   Component Value Date    AST 28 10/17/2023    ALT 14 10/17/2023    ALKPHOS 114 (H) 10/17/2023    BILITOTAL 0.5 10/17/2023    BILIDIRECT 0.30 05/19/2000     No results found for: \"INR\"  Lab Results   Component Value Date    BUN 19.1 10/17/2023    CR 0.66 10/17/2023              Assessment/Plan   Tricia Sosa presents to clinic today for follow up reg her rehab needs.   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   Was last seen in clinic on 5/16/23.  Multiple rehabilitation considerations were discussed with Tricia at " today's visit.  Overall, her knee pain is still what is bothering her the most.  She has not started PT as recommended by orthopedic surgery specifically for her knee OA, but plans on starting this soon.  She has had multiple appointments and felt overwhelmed with that, so delayed initiating this.  It was recommended that she continue with outpatient physical therapy.  We discussed that there are other possible injection options that might give her better pain relief for her knees, and she can discuss that with the orthopedic surgery team/sports medicine.  We will look into this further for her.  She should continue her home exercise regimen as tolerated. Plan to follow up in 6 months. Tricia is in agreement with this plan.      Therapy/equipment/braces:  Continue outpatient physical therapy for strengthening and work on balance.  Start Ortho PT when able to address knee OA symptoms.  Continue home exercise regimen as tolerated.  Medications:  Continue as needed Tylenol, and can try Voltaren gel 4 times daily for pain relief for her knees.  Interventions:  Continue to follow with orthopedic surgery and we will look into other options with sports medicine to see if there are other procedural options that could help improve her pain as CS injections did not work.  Follow up: 6 months.      Marley Heart MD  Physical Medicine & Rehabilitation      50 minutes spent on the date of the encounter doing chart review, history and exam, documentation and further activities as noted above.

## 2023-11-14 NOTE — PROGRESS NOTES
Virtual Visit Details    Type of service:  Video Visit     Originating Location (pt. Location): Home    Distant Location (provider location):  On-site  Platform used for Video Visit: Fany

## 2023-11-14 NOTE — NURSING NOTE
Is the patient currently in the state of [place]? YES    Visit mode:VIDEO    If the visit is dropped, the patient can be reconnected by: VIDEO VISIT: Send to e-mail at: [email or web address]    Will anyone else be joining the visit? NO  (If patient encounters technical issues they should call [phone])    How would you like to obtain your AVS? MyChart    Are changes needed to the allergy or medication list? Pt declined allergy review and Pt stated no med changes    Reason for visit: ROBERT Powell VVF

## 2023-11-14 NOTE — LETTER
11/14/2023         RE: Tricia Sosa  [address]  [facility] [address]        Dear Colleague,    Thank you for referring your patient, Tricia Sosa, to the [facility]. Please see a copy of my visit note below.    Virtual Visit Details    Type of service:  Video Visit     Originating Location (pt. Location): Home    Distant Location (provider location):  On-site  Platform used for Video Visit: [facility]   PM&R clinic note        Interval history:     Tricia Sosa presents to clinic today for follow up reg her rehab needs.   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   Was last seen in clinic on 5/16/23.  Recommendations included:  Therapy/equipment/braces:  Outpatient physical therapy referral placed for strength and balance.  Start 1-2 protein shakes daily.  Can try either Premier protein shakes or Nestlé fair life core power shakes.  Aiming for an extra 30 to 60 g of protein daily.  Obtain soft back brace as planned for support with activities.  Referral / follow up with other providers:  Follow-up with neurology as planned for further evaluation and management of balance difficulties.  Follow up: 6 to 8 months.      Symptoms,  Tricia was seen for a return virtual visit today.  Overall, she feels like her symptoms and strength have remained relatively stable since her last visit.  She has had significant knee OA pain, and had CS injections done by orthopedic surgery for her knees which she feels did not help.  She has not followed up with them yet to determine other management options, and indicates that no other procedural options were discussed with her at that visit.  She states that the pain has not terrible and that it does not significantly affect her activities, however it is increased at night and she notices it when doing certain activities and maneuvers.  She has  continued outpatient physical therapy, and feels that this has not really been helping her with her activity and strength overall.  She continues to try to keep her daily home exercise regimen consistent.  She has some difficulties with exercises on the floor as she does not feel stable when she attempts to get up.        Therapies/HEP,  Continues with outpatient physical therapy.      Functionally,   No changes.      Social history is unchanged.      Medications:  Current Outpatient Medications   Medication Sig Dispense Refill     cyanocobalamin (VITAMIN  B-12) 1000 MCG tablet   3     folic acid (FOLVITE) 1 MG tablet   3     ketoconazole (NIZORAL) 2 % external shampoo Use every other day to scalp as needed 120 mL 0     levothyroxine (SYNTHROID/LEVOTHROID) 150 MCG tablet Take one tablet  by mouth daily 90 tablet 2     triamcinolone (KENALOG) 0.1 % external ointment Apply topically 2 times daily                Physical Exam:   Ht 1.829 m (6')   Wt 63.5 kg (140 lb)   LMP  (LMP Unknown)   BMI 18.99 kg/m    Gen: NAD, pleasant and cooperative   HEENT: Atraumatic, normocephalic, extraocular movements appear intact  Pulm: non-labored breathing in room air  Neuro/MSK:   Orientation: Oriented to person, time, place and situation, Exhibits good insight into her condition and ongoing treatment  Motor: Observed moving upper extremities actively against gravity    Labs/Imaging:  Lab Results   Component Value Date    WBC 4.8 10/17/2023    HGB 11.7 10/17/2023    HCT 34.0 (L) 10/17/2023     10/17/2023     (L) 10/17/2023     Lab Results   Component Value Date     10/17/2023    POTASSIUM 4.3 10/17/2023    CHLORIDE 101 10/17/2023    CO2 28 10/17/2023     (H) 10/17/2023     Lab Results   Component Value Date    GFRESTIMATED 87 10/17/2023    GFRESTBLACK >90 06/29/2021     Lab Results   Component Value Date    AST 28 10/17/2023    ALT 14 10/17/2023    ALKPHOS 114 (H) 10/17/2023    BILITOTAL 0.5 10/17/2023     "BILIDIRECT 0.30 05/19/2000     No results found for: \"INR\"  Lab Results   Component Value Date    BUN 19.1 10/17/2023    CR 0.66 10/17/2023              Assessment/Plan   Tricia Sosa presents to clinic today for follow up reg her rehab needs.   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   Was last seen in clinic on 5/16/23.  Multiple rehabilitation considerations were discussed with Tricia at today's visit.  Overall, her knee pain is still what is bothering her the most.  She has not started PT as recommended by orthopedic surgery specifically for her knee OA, but plans on starting this soon.  She has had multiple appointments and felt overwhelmed with that, so delayed initiating this.  It was recommended that she continue with outpatient physical therapy.  We discussed that there are other possible injection options that might give her better pain relief for her knees, and she can discuss that with the orthopedic surgery team/sports medicine.  We will look into this further for her.  She should continue her home exercise regimen as tolerated. Plan to follow up in 6 months. Tricia is in agreement with this plan.      Therapy/equipment/braces:  Continue outpatient physical therapy for strengthening and work on balance.  Start Ortho PT when able to address knee OA symptoms.  Continue home exercise regimen as tolerated.  Medications:  Continue as needed Tylenol, and can try Voltaren gel 4 times daily for pain relief for her knees.  Interventions:  Continue to follow with orthopedic surgery and we will look into other options with sports medicine to see if there are other procedural options that could help improve her pain as CS injections did not work.  Follow up: 6 months.      Marley Heart MD  Physical Medicine & Rehabilitation      50 minutes spent on the date of the encounter doing chart review, history and exam, documentation and further activities as noted " above.               Again, thank you for allowing me to participate in the care of your patient.        Sincerely,        Marley Heart MD

## 2023-11-15 NOTE — PATIENT INSTRUCTIONS
It was nice to see you again today.    1. Continue your home exercise regimen as tolerated, and continue to follow with outpatient physical therapy. When you are ready, you can start orthopedic PT for your knees.  2. Continue tylenol as needed ofr your knee pain. You can also try voltaren gel which is available over the counter, and can be used up to four times daily as needed for your knee pain.  3. Continue to follow with orthopedic surgery, and we will look into other procedural options to help with knee pain.  4. Follow up with Dr. Heart for a return virtual visit in 6 months.

## 2023-11-16 ENCOUNTER — THERAPY VISIT (OUTPATIENT)
Dept: PHYSICAL THERAPY | Facility: CLINIC | Age: 83
End: 2023-11-16
Attending: INTERNAL MEDICINE
Payer: MEDICARE

## 2023-11-16 DIAGNOSIS — R53.83 OTHER FATIGUE: ICD-10-CM

## 2023-11-16 DIAGNOSIS — D59.10 AUTOIMMUNE HEMOLYTIC ANEMIA (H): ICD-10-CM

## 2023-11-16 DIAGNOSIS — M62.81 GENERALIZED MUSCLE WEAKNESS: Primary | ICD-10-CM

## 2023-11-16 PROCEDURE — 97112 NEUROMUSCULAR REEDUCATION: CPT | Mod: GP | Performed by: PHYSICAL THERAPIST

## 2023-11-16 PROCEDURE — 97110 THERAPEUTIC EXERCISES: CPT | Mod: GP | Performed by: PHYSICAL THERAPIST

## 2023-11-20 NOTE — PROGRESS NOTES
RNCC returned call to Pt. Writer received notification from Pharmacy about Pt out of pocket and request for tablet form of potassium was faxed to clinic.     Communication sent to Dr. Summers. Dr. Summers placed prescription for potassium tablets.     Writer contacted Pt to inform her medication was prescribed in tablet form. Pt asked if she is to take it only for 7 days or indefinitely?  Writer informed Pt to take potassium supplement through labs on 11/20/19. The lab result will be reported to Dr. Summers. Dr. Summers will provide recommendation regarding future potassium supplementation or if none is needed.     Pt agrees with plan. No further questions or concerns. Writer encouraged Pt to contact this RNCC if any arise in the interim.     KALI WestbrookN, RN, OCN  RN Cancer Care Coordinator  [facility]  [phone]    
Tricia called in asking to speak with MAGGIE Barry. She reports she went to  her Potassium and her copay would be $244. She is wondering if there is an alternative that can be prescribed. She is requesting a call back at [phone]    Anabell Hawkins RN, BSN  Patient Care Coordinator  [facility]  [phone]      
No assistance needed

## 2023-11-22 ENCOUNTER — LAB (OUTPATIENT)
Dept: ONCOLOGY | Facility: CLINIC | Age: 83
End: 2023-11-22
Attending: INTERNAL MEDICINE
Payer: MEDICARE

## 2023-11-22 DIAGNOSIS — D59.10 AUTOIMMUNE HEMOLYTIC ANEMIA (H): ICD-10-CM

## 2023-11-22 LAB
ALBUMIN SERPL BCG-MCNC: 4.7 G/DL (ref 3.5–5.2)
ALP SERPL-CCNC: 124 U/L (ref 40–150)
ALT SERPL W P-5'-P-CCNC: 20 U/L (ref 0–50)
ANION GAP SERPL CALCULATED.3IONS-SCNC: 9 MMOL/L (ref 7–15)
AST SERPL W P-5'-P-CCNC: 32 U/L (ref 0–45)
BASOPHILS # BLD AUTO: 0 10E3/UL (ref 0–0.2)
BASOPHILS NFR BLD AUTO: 0 %
BILIRUB SERPL-MCNC: 0.7 MG/DL
BUN SERPL-MCNC: 15.7 MG/DL (ref 8–23)
CALCIUM SERPL-MCNC: 8.5 MG/DL (ref 8.8–10.2)
CHLORIDE SERPL-SCNC: 101 MMOL/L (ref 98–107)
CREAT SERPL-MCNC: 0.65 MG/DL (ref 0.51–0.95)
DEPRECATED HCO3 PLAS-SCNC: 27 MMOL/L (ref 22–29)
EGFRCR SERPLBLD CKD-EPI 2021: 87 ML/MIN/1.73M2
EOSINOPHIL # BLD AUTO: 0 10E3/UL (ref 0–0.7)
EOSINOPHIL NFR BLD AUTO: 0 %
ERYTHROCYTE [DISTWIDTH] IN BLOOD BY AUTOMATED COUNT: 13.9 % (ref 10–15)
GLUCOSE SERPL-MCNC: 93 MG/DL (ref 70–99)
HCT VFR BLD AUTO: 35.7 % (ref 35–47)
HGB BLD-MCNC: 12.3 G/DL (ref 11.7–15.7)
IMM GRANULOCYTES # BLD: 0 10E3/UL
IMM GRANULOCYTES NFR BLD: 0 %
LDH SERPL L TO P-CCNC: 226 U/L (ref 0–250)
LYMPHOCYTES # BLD AUTO: 2.9 10E3/UL (ref 0.8–5.3)
LYMPHOCYTES NFR BLD AUTO: 48 %
MCH RBC QN AUTO: 34.6 PG (ref 26.5–33)
MCHC RBC AUTO-ENTMCNC: 34.5 G/DL (ref 31.5–36.5)
MCV RBC AUTO: 101 FL (ref 78–100)
MONOCYTES # BLD AUTO: 0.6 10E3/UL (ref 0–1.3)
MONOCYTES NFR BLD AUTO: 9 %
NEUTROPHILS # BLD AUTO: 2.7 10E3/UL (ref 1.6–8.3)
NEUTROPHILS NFR BLD AUTO: 43 %
NRBC # BLD AUTO: 0 10E3/UL
NRBC BLD AUTO-RTO: 0 /100
PLATELET # BLD AUTO: 154 10E3/UL (ref 150–450)
POTASSIUM SERPL-SCNC: 3.9 MMOL/L (ref 3.4–5.3)
PROT SERPL-MCNC: 6.2 G/DL (ref 6.4–8.3)
RBC # BLD AUTO: 3.55 10E6/UL (ref 3.8–5.2)
RETICS # AUTO: 0.06 10E6/UL (ref 0.03–0.1)
RETICS/RBC NFR AUTO: 1.7 % (ref 0.5–2)
SODIUM SERPL-SCNC: 137 MMOL/L (ref 135–145)
WBC # BLD AUTO: 6.2 10E3/UL (ref 4–11)

## 2023-11-22 PROCEDURE — 85045 AUTOMATED RETICULOCYTE COUNT: CPT | Performed by: INTERNAL MEDICINE

## 2023-11-22 PROCEDURE — 82784 ASSAY IGA/IGD/IGG/IGM EACH: CPT | Performed by: INTERNAL MEDICINE

## 2023-11-22 PROCEDURE — 83010 ASSAY OF HAPTOGLOBIN QUANT: CPT | Performed by: INTERNAL MEDICINE

## 2023-11-22 PROCEDURE — 80053 COMPREHEN METABOLIC PANEL: CPT | Performed by: INTERNAL MEDICINE

## 2023-11-22 PROCEDURE — 83615 LACTATE (LD) (LDH) ENZYME: CPT | Performed by: INTERNAL MEDICINE

## 2023-11-22 PROCEDURE — 36415 COLL VENOUS BLD VENIPUNCTURE: CPT

## 2023-11-22 PROCEDURE — 85025 COMPLETE CBC W/AUTO DIFF WBC: CPT | Performed by: INTERNAL MEDICINE

## 2023-11-22 NOTE — PROGRESS NOTES
Medical Assistant Note:  Tricia Sosa presents today for blood draw.    Patient seen by provider today: No.   present during visit today: Not Applicable.    Concerns: No Concerns.    Procedure:  Lab draw site: rt antecub, Needle type: butterfly, Gauge: 23.    Post Assessment:  Labs drawn without difficulty: Yes.    Discharge Plan:  Departure Mode: Ambulatory.    Face to Face Time: 10.    Corrie Hurt [facility]

## 2023-11-23 LAB — HAPTOGLOB SERPL-MCNC: <10 MG/DL (ref 30–200)

## 2023-11-24 ENCOUNTER — INFUSION THERAPY VISIT (OUTPATIENT)
Dept: INFUSION THERAPY | Facility: CLINIC | Age: 83
End: 2023-11-24
Attending: INTERNAL MEDICINE
Payer: MEDICARE

## 2023-11-24 ENCOUNTER — DOCUMENTATION ONLY (OUTPATIENT)
Dept: PHARMACY | Facility: CLINIC | Age: 83
End: 2023-11-24
Payer: MEDICARE

## 2023-11-24 VITALS
DIASTOLIC BLOOD PRESSURE: 70 MMHG | HEART RATE: 72 BPM | OXYGEN SATURATION: 99 % | TEMPERATURE: 98.1 F | SYSTOLIC BLOOD PRESSURE: 123 MMHG | BODY MASS INDEX: 19.75 KG/M2 | WEIGHT: 145.6 LBS

## 2023-11-24 DIAGNOSIS — D80.3 SELECTIVE DEFICIENCY OF IGG SUBCLASSES (H): ICD-10-CM

## 2023-11-24 DIAGNOSIS — D59.10 AUTOIMMUNE HEMOLYTIC ANEMIA (H): ICD-10-CM

## 2023-11-24 DIAGNOSIS — D59.19 OTHER AUTOIMMUNE HEMOLYTIC ANEMIA (H): ICD-10-CM

## 2023-11-24 DIAGNOSIS — D83.9 CVID (COMMON VARIABLE IMMUNODEFICIENCY) (H): Primary | ICD-10-CM

## 2023-11-24 LAB
IGA SERPL-MCNC: <2 MG/DL (ref 84–499)
IGG SERPL-MCNC: 442 MG/DL (ref 610–1616)
IGM SERPL-MCNC: <10 MG/DL (ref 35–242)

## 2023-11-24 PROCEDURE — 250N000011 HC RX IP 250 OP 636: Performed by: INTERNAL MEDICINE

## 2023-11-24 PROCEDURE — 96366 THER/PROPH/DIAG IV INF ADDON: CPT

## 2023-11-24 PROCEDURE — 96365 THER/PROPH/DIAG IV INF INIT: CPT

## 2023-11-24 PROCEDURE — 96375 TX/PRO/DX INJ NEW DRUG ADDON: CPT

## 2023-11-24 RX ORDER — EPINEPHRINE 1 MG/ML
0.3 INJECTION, SOLUTION INTRAMUSCULAR; SUBCUTANEOUS EVERY 5 MIN PRN
Status: CANCELLED | OUTPATIENT
Start: 2023-12-17

## 2023-11-24 RX ORDER — HEPARIN SODIUM,PORCINE 10 UNIT/ML
5 VIAL (ML) INTRAVENOUS
Status: CANCELLED | OUTPATIENT
Start: 2023-12-17

## 2023-11-24 RX ORDER — NALOXONE HYDROCHLORIDE 0.4 MG/ML
0.2 INJECTION, SOLUTION INTRAMUSCULAR; INTRAVENOUS; SUBCUTANEOUS
Status: CANCELLED | OUTPATIENT
Start: 2023-12-17

## 2023-11-24 RX ORDER — METHYLPREDNISOLONE SODIUM SUCCINATE 40 MG/ML
20 INJECTION, POWDER, LYOPHILIZED, FOR SOLUTION INTRAMUSCULAR; INTRAVENOUS ONCE
Status: CANCELLED
Start: 2024-01-09 | End: 2023-12-17

## 2023-11-24 RX ORDER — HEPARIN SODIUM (PORCINE) LOCK FLUSH IV SOLN 100 UNIT/ML 100 UNIT/ML
5 SOLUTION INTRAVENOUS
Status: CANCELLED | OUTPATIENT
Start: 2023-12-17

## 2023-11-24 RX ORDER — METHYLPREDNISOLONE SODIUM SUCCINATE 40 MG/ML
20 INJECTION, POWDER, LYOPHILIZED, FOR SOLUTION INTRAMUSCULAR; INTRAVENOUS ONCE
Status: COMPLETED | OUTPATIENT
Start: 2023-11-24 | End: 2023-11-24

## 2023-11-24 RX ORDER — METHYLPREDNISOLONE SODIUM SUCCINATE 125 MG/2ML
125 INJECTION, POWDER, LYOPHILIZED, FOR SOLUTION INTRAMUSCULAR; INTRAVENOUS
Status: CANCELLED
Start: 2023-12-17

## 2023-11-24 RX ORDER — ALBUTEROL SULFATE 0.83 MG/ML
2.5 SOLUTION RESPIRATORY (INHALATION)
Status: CANCELLED | OUTPATIENT
Start: 2023-12-17

## 2023-11-24 RX ORDER — ALBUTEROL SULFATE 90 UG/1
1-2 AEROSOL, METERED RESPIRATORY (INHALATION)
Status: CANCELLED
Start: 2023-12-17

## 2023-11-24 RX ORDER — DIPHENHYDRAMINE HYDROCHLORIDE 50 MG/ML
50 INJECTION INTRAMUSCULAR; INTRAVENOUS
Status: CANCELLED
Start: 2023-12-17

## 2023-11-24 RX ORDER — MEPERIDINE HYDROCHLORIDE 25 MG/ML
25 INJECTION INTRAMUSCULAR; INTRAVENOUS; SUBCUTANEOUS EVERY 30 MIN PRN
Status: CANCELLED | OUTPATIENT
Start: 2023-12-17

## 2023-11-24 RX ADMIN — METHYLPREDNISOLONE SODIUM SUCCINATE 20 MG: 40 INJECTION, POWDER, FOR SOLUTION INTRAMUSCULAR; INTRAVENOUS at 12:14

## 2023-11-24 RX ADMIN — HUMAN IMMUNOGLOBULIN G 35 G: 20 LIQUID INTRAVENOUS at 12:20

## 2023-11-24 NOTE — PROGRESS NOTES
Infusion Nursing Note:  Tricia Sosa presents today for IVIG with premeds.    Patient seen by provider today: No   present during visit today: Not Applicable.    Note: No fevers, active infection/illness, current antibiotics or recent surgeries.    IVIG initiated at rate of 0.5mg/kg/hr.  Increased by 0.5mg/kg/hr, every 15 minutes to max rate of 4.0mg/kg/hr    Pt continues to receive IVIG monthly for CVID if IgG less than 600. Continues to receive Rituxan every 2 months for hemolytic anemia    Pt reports she is seeing a neurologist currently due to increased weakness, most specifically in LEs. Reports a fall about a month ago. Awaiting MRI of LEs next moth, followed by a possible muscle biopsy to r/o myositis.      Intravenous Access:  Peripheral IV placed.    Treatment Conditions:  Results reviewed, labs MET treatment parameters, ok to proceed with treatment  IgG 442 on 11/22/23.      Post Infusion Assessment:  Patient tolerated infusion without incident.  Blood return noted pre and post infusion.  Site patent and intact, free from redness, edema or discomfort.  No evidence of extravasations.  Access discontinued per protocol.       Discharge Plan:   [facility]  Patient will return 12/12/23 for labs/RC/Rituxan for next appointment.   Patient discharged in stable condition accompanied by: self.  Departure Mode: Ambulatory.      Estrellita Davis RN

## 2023-11-24 NOTE — PROGRESS NOTES
Pharmacist IVIG Stewardship Program    Diagnosis: Common Variable Immunodeficiency  Patient was originally diagnosed with Baires back in the 2000's and later transferred her care to [facility]   Date 5/27/2015   IgA Level  Undetectable   IgG Total 658   IgG2 106   IgG4 0.5   IgM Level 48     Current Dosing Regimen: Privigen 35g IV monthly.    Titrated down from every 3 week infusions to every 4 week infusions in 2017  Titrated down further from every 4 weeks to every 6 weeks infusions in March 2018, but patient declined drastically and had to be escalated back to monthly infusions in September 2018  Pre-medications:  Methylprednisolone 20 mg IV push prior to infusion  Current Titration Regimen: 0.5ml/kg/hr and increased by 0.5ml/kg/hr every 15 minutes, max rate 4ml/kg/hr.  Previously Tried Products: Gammagard SD, currently on Privigen    Side Effects: Patient denies side effects such as headaches, flushing, fever, muscle or joint pain, nausea, or rash/itching.    Interventions: Lab review completed.       Assessment:   Date  11/22/23   IgA <2   IgM <10   Patient's level is below standard range and she is appropriate for additional IVIG therapy. She is tolerating infusions well and IVIG infusions are effective in increasing Ig levels to an appropriate level to minimize patients risk of infection. Patient should continue on treatment as she has been per provider orders, without therapy her levels would drop below therapeutic range.    Plan: Patient is okay to proceed with infusions as planned per provider order through until her next Immunoglobulin lab draw, tentatively scheduled for 12/2023.     Next Review Needed: 12/2023  Lynsey Narvaez, TannerD, IgCP

## 2023-11-28 ENCOUNTER — THERAPY VISIT (OUTPATIENT)
Dept: PHYSICAL THERAPY | Facility: CLINIC | Age: 83
End: 2023-11-28
Attending: INTERNAL MEDICINE
Payer: MEDICARE

## 2023-11-28 DIAGNOSIS — D59.10 AUTOIMMUNE HEMOLYTIC ANEMIA (H): ICD-10-CM

## 2023-11-28 DIAGNOSIS — M62.81 GENERALIZED MUSCLE WEAKNESS: Primary | ICD-10-CM

## 2023-11-28 PROCEDURE — 97116 GAIT TRAINING THERAPY: CPT | Mod: GP | Performed by: PHYSICAL THERAPIST

## 2023-11-28 PROCEDURE — 97750 PHYSICAL PERFORMANCE TEST: CPT | Mod: GP | Performed by: PHYSICAL THERAPIST

## 2023-11-28 PROCEDURE — 97110 THERAPEUTIC EXERCISES: CPT | Mod: GP | Performed by: PHYSICAL THERAPIST

## 2023-11-28 NOTE — PROGRESS NOTES
11/28/23 1400   Signing Clinician's Name / Credentials   Signing clinician's name / credentials KAYLEEN Contreras   Functional Gait Assessment (ALESSANDRO Fine, LG Josue FKeon, et al. (2004))   1. GAIT LEVEL SURFACE 1  (8.84 sec)   2. CHANGE IN GAIT SPEED 2   3. GAIT WITH HORIZONTAL HEAD TURNS 2   4. GAIT WITH VERTICAL HEAD TURNS 2   5. GAIT AND PIVOT TURN 2   6. STEP OVER OBSTACLE 2   7. GAIT WITH NARROW BASE OF SUPPORT 0   8. GAIT WITH EYES CLOSED 0  (went 8-10 feet)   9. AMBULATING BACKWARDS 2  (44.5 sec)   10. STEPS 2   Total Functional Gait Assessment Score   TOTAL SCORE: (MAXIMUM SCORE 30) 15       Functional Gait Assessment (FGA): The FGA assesses postural stability during various walking tasks.   Gait assistive device used: None    Scores of <22 /30 have been correlated with predicting falls in community-dwelling older adults according to Babatunde & Christiano 2010.   Scores of <18 /30 have been correlated with increased risk for falls in patients with Parkinsons Disease according to Hu Abraham Zhou et al 2014.  Minimal Detectable Change for patients with acute/chronic stroke = 4.2 according to Jesus & Kane 2009  Minimal Detectable Change for patients with vestibular disorder = 8 according to Babatunde & Christiano 2010    Assessment (rationale for performing, application to patient s function & care plan): progress, fall risk  (Minutes billed as physical performance test):  15 min

## 2023-11-30 ENCOUNTER — TELEPHONE (OUTPATIENT)
Dept: INTERNAL MEDICINE | Facility: CLINIC | Age: 83
End: 2023-11-30
Payer: MEDICARE

## 2023-11-30 NOTE — TELEPHONE ENCOUNTER
Reason for Call:  Appointment Request    Patient requesting this type of appt:  Injury on middle finger on right hand at joint of finger nail -  2 weeks ago    Requested provider: Any available provider    Reason patient unable to be scheduled: Not within requested timeframe    When does patient want to be seen/preferred time:  12/05    Comments: Pt has a lab appt 12/05 @ 1:45 and would like it around that time or is the afternoon on 12/04 if nothing available 12/05.    Could we send this information to you in [employer] or would you prefer to receive a phone call?:   Patient would like to be contacted via [employer]    Call taken on 11/30/2023 at 9:23 AM by ERNESTO THORNTON

## 2023-12-05 ENCOUNTER — OFFICE VISIT (OUTPATIENT)
Dept: INTERNAL MEDICINE | Facility: CLINIC | Age: 83
End: 2023-12-05
Payer: MEDICARE

## 2023-12-05 ENCOUNTER — LAB (OUTPATIENT)
Dept: LAB | Facility: CLINIC | Age: 83
End: 2023-12-05
Payer: MEDICARE

## 2023-12-05 ENCOUNTER — ANCILLARY PROCEDURE (OUTPATIENT)
Dept: GENERAL RADIOLOGY | Facility: CLINIC | Age: 83
End: 2023-12-05
Payer: MEDICARE

## 2023-12-05 VITALS
DIASTOLIC BLOOD PRESSURE: 68 MMHG | HEART RATE: 95 BPM | TEMPERATURE: 97.9 F | WEIGHT: 144 LBS | HEIGHT: 72 IN | SYSTOLIC BLOOD PRESSURE: 124 MMHG | OXYGEN SATURATION: 100 % | BODY MASS INDEX: 19.5 KG/M2 | RESPIRATION RATE: 18 BRPM

## 2023-12-05 DIAGNOSIS — G72.9 MYOPATHY, UNSPECIFIED: Primary | ICD-10-CM

## 2023-12-05 DIAGNOSIS — S69.91XA INJURY OF FINGER OF RIGHT HAND, INITIAL ENCOUNTER: ICD-10-CM

## 2023-12-05 DIAGNOSIS — S69.91XA INJURY OF FINGER OF RIGHT HAND, INITIAL ENCOUNTER: Primary | ICD-10-CM

## 2023-12-05 LAB
ALBUMIN SERPL BCG-MCNC: 4.3 G/DL (ref 3.5–5.2)
ALP SERPL-CCNC: 107 U/L (ref 40–150)
ALT SERPL W P-5'-P-CCNC: 17 U/L (ref 0–50)
AST SERPL W P-5'-P-CCNC: 31 U/L (ref 0–45)
BILIRUB DIRECT SERPL-MCNC: <0.2 MG/DL (ref 0–0.3)
BILIRUB SERPL-MCNC: 0.5 MG/DL
CREAT SERPL-MCNC: 0.59 MG/DL (ref 0.51–0.95)
EGFRCR SERPLBLD CKD-EPI 2021: 89 ML/MIN/1.73M2
LDH SERPL L TO P-CCNC: 221 U/L (ref 0–250)
Lab: NORMAL
Lab: NORMAL
PERFORMING LABORATORY: NORMAL
PERFORMING LABORATORY: NORMAL
PROT SERPL-MCNC: 6.3 G/DL (ref 6.4–8.3)
SPECIMEN STATUS: NORMAL
SPECIMEN STATUS: NORMAL
TEST NAME: NORMAL
TEST NAME: NORMAL

## 2023-12-05 PROCEDURE — 82085 ASSAY OF ALDOLASE: CPT | Mod: 90 | Performed by: INTERNAL MEDICINE

## 2023-12-05 PROCEDURE — 99213 OFFICE O/P EST LOW 20 MIN: CPT

## 2023-12-05 PROCEDURE — 86235 NUCLEAR ANTIGEN ANTIBODY: CPT | Performed by: INTERNAL MEDICINE

## 2023-12-05 PROCEDURE — 82565 ASSAY OF CREATININE: CPT | Performed by: INTERNAL MEDICINE

## 2023-12-05 PROCEDURE — 36415 COLL VENOUS BLD VENIPUNCTURE: CPT | Performed by: INTERNAL MEDICINE

## 2023-12-05 PROCEDURE — 83615 LACTATE (LD) (LDH) ENZYME: CPT | Performed by: INTERNAL MEDICINE

## 2023-12-05 PROCEDURE — 99000 SPECIMEN HANDLING OFFICE-LAB: CPT | Performed by: INTERNAL MEDICINE

## 2023-12-05 PROCEDURE — 73140 X-RAY EXAM OF FINGER(S): CPT | Mod: TC | Performed by: RADIOLOGY

## 2023-12-05 PROCEDURE — 80076 HEPATIC FUNCTION PANEL: CPT | Performed by: INTERNAL MEDICINE

## 2023-12-05 PROCEDURE — 83516 IMMUNOASSAY NONANTIBODY: CPT | Performed by: INTERNAL MEDICINE

## 2023-12-05 RX ORDER — RESPIRATORY SYNCYTIAL VIRUS VACCINE 120MCG/0.5
0.5 KIT INTRAMUSCULAR ONCE
Qty: 1 EACH | Refills: 0 | Status: CANCELLED | OUTPATIENT
Start: 2023-12-05 | End: 2023-12-05

## 2023-12-05 NOTE — PROGRESS NOTES
Assessment & Plan     (S69.91XA) Injury of finger of right hand, initial encounter  (primary encounter diagnosis)  Comment: Patient presents to the clinic with an acute injury to the right middle finger.  Original injury happened November 12.  Swelling has remained the same.  Patient does have good range of motion with slight decrease due to swelling.  Clinical picture suggests injury to the right middle finger.  At this time will obtain x-ray to ensure that there is no fracture and will help apply a splint to the finger to help support and aid in proper healing.  If fracture to the finger will refer to Ortho.  Plan: XR Finger Right G/E 2 Views        Imaging pending.  Did discuss with the patient that finger injuries can take a long time to heal and will refer to specialty if needed.      20 minutes spent by me on the date of the encounter doing chart review, review of test results, interpretation of tests, patient visit, and documentation            SHANIQUE Aldana [facility] SAHIL Clarke is a 83 year old, presenting for the following health issues: Patient presents to the clinic with a chief complaint of her right hand middle finger injury that happened on November 12.  She states she was grabbing something out of the back of her truck and her finger caught on the tunnel cover.  There is some bruising near the cuticle area of the finger and there is swelling to the tip of the finger down through the middle of the finger.  Patient is worried that it has not got better yet.  She does have a finger splint with her and she is wondering if that would be helpful for her.  Finger (Right hand, middle finger injury since 11/12/2023.)      12/5/2023     1:39 PM   Additional Questions   Roomed by Amlaia LOPEZ CMA       History of Present Illness       Reason for visit:  An injury to the nail bed joint, middle finger right hand hasn't healed.  Injured it Nov 12th.  Symptoms include:   Pain in that joint when nail touches things, redness, swelling  Symptom intensity:  Moderate  Symptom progression:  Staying the same  Had these symptoms before:  No  What makes it worse:  NA  What makes it better:  NA    She eats 2-3 servings of fruits and vegetables daily.She consumes 0 sweetened beverage(s) daily.She exercises with enough effort to increase her heart rate 20 to 29 minutes per day.  She exercises with enough effort to increase her heart rate 7 days per week. She is missing 1 dose(s) of medications per week.  She is not taking prescribed medications regularly due to remembering to take.                     Review of Systems   Constitutional, HEENT, cardiovascular, pulmonary, gi and gu systems are negative, except as otherwise noted.      Objective    /68 (BP Location: Left arm, Patient Position: Sitting, Cuff Size: Adult Regular)   Pulse 95   Temp 97.9  F (36.6  C) (Tympanic)   Resp 18   Ht 1.829 m (6')   Wt 65.3 kg (144 lb)   LMP  (LMP Unknown)   SpO2 100%   Breastfeeding No   BMI 19.53 kg/m    Body mass index is 19.53 kg/m .  Physical Exam   GENERAL: healthy, alert and no distress  SKIN: no suspicious lesions or rashes  RIGHT MIDDLE FINGER: Swollen and red right middle finger with old dried blood near cuticle.  Slight treat decreased range of motion

## 2023-12-07 LAB — ALDOLASE SERPL-CCNC: 3.1 U/L

## 2023-12-11 NOTE — PROGRESS NOTES
Oncology/Hematology Visit Note  Dec 12, 2023    Reason for Visit: Follow up of CVID, autoimmune hemolytic anemia, iron deficiency      HISTORY OF PRESENT ILLNESS: Tricia Sosa is a 83 year old female who presents with autoimmune hemolytic anemia and IgA deficiency.  She previously saw Dr. Matos and then Dr. Summers.  She was previously seen at [facility].     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  She was very sick at the time and had severe diarrhea.  She lost about 40 pounds in weight.  The workup revealed that she had markedly diminished CD4 counts of 48 and was diagnosed with CMV colitis.  Since then she has been followed in hematology clinic at [place] until recently.  She was noted to have anemia which was fairly long-standing.  She was initially referred for anemia in 2004 and also had a bone marrow biopsy done at that time which revealed hypercellular bone marrow with 70-80% cellularity and normal trilineage hematopoiesis and no morphologic features of MDS or lymphoproliferation.  Her anemia was attributed to her chronic underlying disease.  At the next evaluation in 2002 on 4 aggressive anemia there was no evidence of hemolysis, iron deficiency, B12 or folate deficiency.  Bone marrow biopsy was not pursued.  In summer of 2015 she had progressive fatigue, dyspnea on exertion and worsening anemia with a hemoglobin now of 9.  Workup at this time did suggest a hemolytic anemia with elevated LDH at 709, undetectable haptoglobin and elevated unconjugated bilirubin at 3.3.  Monospecific MARIKA was positive for both IgG and complement.  Cold agglutinin screen was positive with very low titer 1:64.  She was started on prednisone 60 mg daily with supplementation of iron folate and B12 starting 7/31/15.  Her prednisone has been slowly tapered and was discontinued off in January 2016.  She was last followed at [facility] on March 10, 2016 when she had stable hemoglobin.      She was followed by Dr. Matos and Dr. Summers.  Due to relapse of hemolytic anemia, she underwent another course of prednisone that has since been tapered off and rituximab x 4 weekly doses completed in 9/19/19.     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  She started weekly Rituxan on 7/31/20 x 4 doses, completed on 8/21/20.  She tapered off of prednisone in October 2020.     Due to relapse of her AIHA, she started prednisone again on 6/8/21 at 60 mg daily with slow taper and finished taper in August 2021.     Due to relapse of AIHA again, she started prednisone again on 11/2/21 at 70 mg daily with slow taper.  She also completed weekly Rituxan again x 4 doses 12/6/21-12/29/21.     Venofer 5/5-5/19/22.     She is now on Rituxan every 2 months and IVIG monthly if IgG <600.    Interval History:  Tricia is overall doing OK. Still dealing with leg weakness and MRI per neurology with myositis, may need biopsy. She did have a fall a couple weeks ago, hurt her hip but improved now. She still has WARD, stable. Denies any fevers or recent infections. No side effects from Rituxan or IVIG. No bleeding concerns.     Current Outpatient Medications   Medication Sig Dispense Refill    cyanocobalamin (VITAMIN  B-12) 1000 MCG tablet   3    folic acid (FOLVITE) 1 MG tablet   3    ketoconazole (NIZORAL) 2 % external shampoo Use every other day to scalp as needed 120 mL 0    levothyroxine (SYNTHROID/LEVOTHROID) 150 MCG tablet Take one tablet  by mouth daily 90 tablet 2    triamcinolone (KENALOG) 0.1 % external ointment Apply topically 2 times daily         Past Medical History  Past Medical History:   Diagnosis Date    WARD (dyspnea on exertion)     External hemorrhoids without mention of complication 6/27/05    Hemolytic anemia (H24)     autoimmune    Hypothyroidism     IgA deficiency (H)     Myopia of both eyes with astigmatism and presbyopia 8/16/2017    Other malaise and  fatigue     Other severe protein-calorie malnutrition     Other vitreous opacities 12/19/2006    Thyroid disease     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    Hemorrhage Subdural Trauma Without LOC Active    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, and posterior vitreous detachment    Urinary tract infection, site not specified     Recurrent UTI's     Past Surgical History:   Procedure Laterality Date    COLONOSCOPY N/A 4/26/2016    Procedure: COLONOSCOPY;  Surgeon: Dontae Del Rio MD;  Location:  GI    ENT SURGERY  1985    Thyroid lobectomy    EYE SURGERY Bilateral 04/20/2021    Cataract Surgery    HC CYSTOSCOPY,INSERT URETERAL STENT      Ureteral stent insertion    HC FLEX SIGMOIDOSCOPY W BIOPSY  5/30/00    HC LAPAROSCOPY, SURGICAL; CHOLECYSTECTOMY  1992    HC THYROIDECTOMY      LIGATION OF HEMORRHOID(S)      Hemorrhoidectomy    UPPER GI ENDOSCOPY,BIOPSY  10/01/01    ZZC LIGATE FALLOPIAN TUBE      ZZ COLONOSCOPY W BIOPSY  4/26/00    ZZHC COLONOSCOPY W BIOPSY  10/8/03    ZZ COLONOSCOPY W BIOPSY  6/27/05    REPEAT IN 5 YEARS     Allergies   Allergen Reactions    Doxycycline      Social History   Social History     Tobacco Use    Smoking status: Never    Smokeless tobacco: Never   Vaping Use    Vaping Use: Never used   Substance Use Topics    Alcohol use: Yes     Alcohol/week: 0.0 standard drinks of alcohol     Comment: 1 a day at most    Drug use: No      Past medical history and social history were reviewed.    Physical Examination:  /76   Pulse 91   Temp 98.4  F (36.9  C) (Oral)   Resp 18   Wt 65.3 kg (144 lb)   LMP  (LMP Unknown)   SpO2 97%   BMI 19.53 kg/m    Wt Readings from Last 10 Encounters:   12/05/23 65.3 kg (144 lb)   11/24/23 66 kg (145 lb 9.6 oz)   11/14/23 63.5 kg (140 lb)   11/06/23 64.3 kg (141 lb 12.8 oz)   10/30/23 65.3 kg (144 lb)   10/18/23 65.5 kg (144 lb 6.4 oz)   10/17/23 66.2 kg (145 lb 14.4 oz)   09/29/23 66.2 kg (145 lb  14.4 oz)   08/22/23 65.8 kg (145 lb)   08/04/23 65.3 kg (144 lb)     Constitutional: Well-appearing female in no acute distress.  Eyes: EOMI, PERRL. No scleral icterus.  ENT: Oral mucosa is moist without lesions or thrush.   Lymphatic: Neck is supple without cervical or supraclavicular lymphadenopathy.   Cardiovascular: Regular rate and rhythm. No murmurs, gallops, or rubs. No peripheral edema.  Respiratory: Clear to auscultation bilaterally. No wheezes or crackles.  Neurologic: Cranial nerves II through XII are grossly intact.  Skin: No rashes, petechiae, or bruising noted on exposed skin.    Laboratory Data:   Latest Reference Range & Units 12/12/23 12:51   Sodium 135 - 145 mmol/L 139   Potassium 3.4 - 5.3 mmol/L 4.2   Chloride 98 - 107 mmol/L 104   Carbon Dioxide (CO2) 22 - 29 mmol/L 26   Urea Nitrogen 8.0 - 23.0 mg/dL 15.8   Creatinine 0.51 - 0.95 mg/dL 0.65   GFR Estimate >60 mL/min/1.73m2 87   Calcium 8.8 - 10.2 mg/dL 8.5 (L)   Anion Gap 7 - 15 mmol/L 9   Albumin 3.5 - 5.2 g/dL 4.4   Protein Total 6.4 - 8.3 g/dL 6.2 (L)   Alkaline Phosphatase 40 - 150 U/L 109   ALT 0 - 50 U/L 17   AST 0 - 45 U/L 26   Bilirubin Total <=1.2 mg/dL 0.6   Glucose 70 - 99 mg/dL 100 (H)   Iron 37 - 145 ug/dL 104   Iron Binding Capacity 240 - 430 ug/dL 309   Iron Sat Index 15 - 46 % 34   Lactate Dehydrogenase 0 - 250 U/L 225   WBC 4.0 - 11.0 10e3/uL 4.8   Hemoglobin 11.7 - 15.7 g/dL 11.7   Hematocrit 35.0 - 47.0 % 33.1 (L)   Platelet Count 150 - 450 10e3/uL 129 (L)   RBC Count 3.80 - 5.20 10e6/uL 3.29 (L)   MCV 78 - 100 fL 101 (H)   MCH 26.5 - 33.0 pg 35.6 (H)   MCHC 31.5 - 36.5 g/dL 35.3   RDW 10.0 - 15.0 % 13.8   % Neutrophils % 40   % Lymphocytes % 50   % Monocytes % 10   % Eosinophils % 0   % Basophils % 0   Absolute Basophils 0.0 - 0.2 10e3/uL 0.0   Absolute Eosinophils 0.0 - 0.7 10e3/uL 0.0   Absolute Immature Granulocytes <=0.4 10e3/uL 0.0   Absolute Lymphocytes 0.8 - 5.3 10e3/uL 2.4   Absolute Monocytes 0.0 - 1.3 10e3/uL 0.5    % Immature Granulocytes % 0   Absolute Neutrophils 1.6 - 8.3 10e3/uL 1.9   Absolute NRBCs 10e3/uL 0.0   NRBCs per 100 WBC <1 /100 0   % Retic 0.5 - 2.0 % 1.4   Absolute Retic 0.025 - 0.095 10e6/uL 0.047   (L): Data is abnormally low  (H): Data is abnormally high      Assessment and Plan:  # 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 Has had multiple relapses, most recently November 2021. She completed prolonged prednisone taper and Rituxan. She is off prednisone now. Has bactrim when she is on prednisone. She is now on Rixuan every 2 months with stable labs  - CBC reviewed, hgb WNL. Mild thrombocytopenia stable  - Continue with Rituxan today  - Continue with Rituxan every 2 months with provider visit   - CBC monitoring monthly      # CVID  Follows with immunology as well.   - Doing IVIG monthly when IGG level is <600.   - Today pending, will monitor for results and schedule infusion if needed   - CD4 count was low, met with ID, no indication for PJP ppx at this time      # EVI  Prior ferritin was low at 6 in April 2022. S/p Vneofer x 3.   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

## 2023-12-12 ENCOUNTER — LAB (OUTPATIENT)
Dept: INFUSION THERAPY | Facility: CLINIC | Age: 83
End: 2023-12-12
Attending: INTERNAL MEDICINE
Payer: MEDICARE

## 2023-12-12 ENCOUNTER — ONCOLOGY VISIT (OUTPATIENT)
Dept: ONCOLOGY | Facility: CLINIC | Age: 83
End: 2023-12-12
Attending: INTERNAL MEDICINE
Payer: MEDICARE

## 2023-12-12 VITALS
HEART RATE: 91 BPM | WEIGHT: 144 LBS | RESPIRATION RATE: 18 BRPM | BODY MASS INDEX: 19.53 KG/M2 | OXYGEN SATURATION: 97 % | SYSTOLIC BLOOD PRESSURE: 138 MMHG | DIASTOLIC BLOOD PRESSURE: 76 MMHG | TEMPERATURE: 98.4 F

## 2023-12-12 DIAGNOSIS — D83.9 CVID (COMMON VARIABLE IMMUNODEFICIENCY) (H): ICD-10-CM

## 2023-12-12 DIAGNOSIS — D83.9 CVID (COMMON VARIABLE IMMUNODEFICIENCY) (H): Primary | ICD-10-CM

## 2023-12-12 DIAGNOSIS — D59.19 OTHER AUTOIMMUNE HEMOLYTIC ANEMIA (H): ICD-10-CM

## 2023-12-12 DIAGNOSIS — D59.19 OTHER AUTOIMMUNE HEMOLYTIC ANEMIA (H): Primary | ICD-10-CM

## 2023-12-12 DIAGNOSIS — D59.10 AUTOIMMUNE HEMOLYTIC ANEMIA (H): ICD-10-CM

## 2023-12-12 LAB
ALBUMIN SERPL BCG-MCNC: 4.4 G/DL (ref 3.5–5.2)
ALP SERPL-CCNC: 109 U/L (ref 40–150)
ALT SERPL W P-5'-P-CCNC: 17 U/L (ref 0–50)
ANION GAP SERPL CALCULATED.3IONS-SCNC: 9 MMOL/L (ref 7–15)
AST SERPL W P-5'-P-CCNC: 26 U/L (ref 0–45)
BASOPHILS # BLD AUTO: 0 10E3/UL (ref 0–0.2)
BASOPHILS NFR BLD AUTO: 0 %
BILIRUB SERPL-MCNC: 0.6 MG/DL
BUN SERPL-MCNC: 15.8 MG/DL (ref 8–23)
CALCIUM SERPL-MCNC: 8.5 MG/DL (ref 8.8–10.2)
CHLORIDE SERPL-SCNC: 104 MMOL/L (ref 98–107)
CREAT SERPL-MCNC: 0.65 MG/DL (ref 0.51–0.95)
DEPRECATED HCO3 PLAS-SCNC: 26 MMOL/L (ref 22–29)
EGFRCR SERPLBLD CKD-EPI 2021: 87 ML/MIN/1.73M2
EOSINOPHIL # BLD AUTO: 0 10E3/UL (ref 0–0.7)
EOSINOPHIL NFR BLD AUTO: 0 %
ERYTHROCYTE [DISTWIDTH] IN BLOOD BY AUTOMATED COUNT: 13.8 % (ref 10–15)
GLUCOSE SERPL-MCNC: 100 MG/DL (ref 70–99)
HAPTOGLOB SERPL-MCNC: <10 MG/DL (ref 30–200)
HCT VFR BLD AUTO: 33.1 % (ref 35–47)
HGB BLD-MCNC: 11.7 G/DL (ref 11.7–15.7)
IMM GRANULOCYTES # BLD: 0 10E3/UL
IMM GRANULOCYTES NFR BLD: 0 %
IRON BINDING CAPACITY (ROCHE): 309 UG/DL (ref 240–430)
IRON SATN MFR SERPL: 34 % (ref 15–46)
IRON SERPL-MCNC: 104 UG/DL (ref 37–145)
LDH SERPL L TO P-CCNC: 225 U/L (ref 0–250)
LYMPHOCYTES # BLD AUTO: 2.4 10E3/UL (ref 0.8–5.3)
LYMPHOCYTES NFR BLD AUTO: 50 %
MCH RBC QN AUTO: 35.6 PG (ref 26.5–33)
MCHC RBC AUTO-ENTMCNC: 35.3 G/DL (ref 31.5–36.5)
MCV RBC AUTO: 101 FL (ref 78–100)
MONOCYTES # BLD AUTO: 0.5 10E3/UL (ref 0–1.3)
MONOCYTES NFR BLD AUTO: 10 %
NEUTROPHILS # BLD AUTO: 1.9 10E3/UL (ref 1.6–8.3)
NEUTROPHILS NFR BLD AUTO: 40 %
NRBC # BLD AUTO: 0 10E3/UL
NRBC BLD AUTO-RTO: 0 /100
PLATELET # BLD AUTO: 129 10E3/UL (ref 150–450)
POTASSIUM SERPL-SCNC: 4.2 MMOL/L (ref 3.4–5.3)
PROT SERPL-MCNC: 6.2 G/DL (ref 6.4–8.3)
RBC # BLD AUTO: 3.29 10E6/UL (ref 3.8–5.2)
RETICS # AUTO: 0.05 10E6/UL (ref 0.03–0.1)
RETICS/RBC NFR AUTO: 1.4 % (ref 0.5–2)
SODIUM SERPL-SCNC: 139 MMOL/L (ref 135–145)
WBC # BLD AUTO: 4.8 10E3/UL (ref 4–11)

## 2023-12-12 PROCEDURE — 83010 ASSAY OF HAPTOGLOBIN QUANT: CPT | Performed by: INTERNAL MEDICINE

## 2023-12-12 PROCEDURE — 250N000011 HC RX IP 250 OP 636: Mod: JZ | Performed by: INTERNAL MEDICINE

## 2023-12-12 PROCEDURE — 96415 CHEMO IV INFUSION ADDL HR: CPT

## 2023-12-12 PROCEDURE — 80053 COMPREHEN METABOLIC PANEL: CPT | Performed by: INTERNAL MEDICINE

## 2023-12-12 PROCEDURE — 99213 OFFICE O/P EST LOW 20 MIN: CPT | Performed by: PHYSICIAN ASSISTANT

## 2023-12-12 PROCEDURE — G0463 HOSPITAL OUTPT CLINIC VISIT: HCPCS | Performed by: PHYSICIAN ASSISTANT

## 2023-12-12 PROCEDURE — 82784 ASSAY IGA/IGD/IGG/IGM EACH: CPT | Performed by: INTERNAL MEDICINE

## 2023-12-12 PROCEDURE — 36415 COLL VENOUS BLD VENIPUNCTURE: CPT

## 2023-12-12 PROCEDURE — 85045 AUTOMATED RETICULOCYTE COUNT: CPT | Performed by: INTERNAL MEDICINE

## 2023-12-12 PROCEDURE — 83550 IRON BINDING TEST: CPT | Performed by: INTERNAL MEDICINE

## 2023-12-12 PROCEDURE — 82728 ASSAY OF FERRITIN: CPT | Performed by: INTERNAL MEDICINE

## 2023-12-12 PROCEDURE — 96413 CHEMO IV INFUSION 1 HR: CPT

## 2023-12-12 PROCEDURE — 250N000013 HC RX MED GY IP 250 OP 250 PS 637: Performed by: INTERNAL MEDICINE

## 2023-12-12 PROCEDURE — 258N000003 HC RX IP 258 OP 636: Performed by: INTERNAL MEDICINE

## 2023-12-12 PROCEDURE — 83615 LACTATE (LD) (LDH) ENZYME: CPT | Performed by: INTERNAL MEDICINE

## 2023-12-12 PROCEDURE — 85025 COMPLETE CBC W/AUTO DIFF WBC: CPT | Performed by: INTERNAL MEDICINE

## 2023-12-12 RX ORDER — DIPHENHYDRAMINE HCL 25 MG
50 CAPSULE ORAL ONCE
Status: COMPLETED | OUTPATIENT
Start: 2023-12-12 | End: 2023-12-12

## 2023-12-12 RX ORDER — ACETAMINOPHEN 325 MG/1
650 TABLET ORAL ONCE
Status: COMPLETED | OUTPATIENT
Start: 2023-12-12 | End: 2023-12-12

## 2023-12-12 RX ADMIN — ACETAMINOPHEN 325 MG: 325 TABLET ORAL at 14:12

## 2023-12-12 RX ADMIN — SODIUM CHLORIDE 250 ML: 9 INJECTION, SOLUTION INTRAVENOUS at 14:14

## 2023-12-12 RX ADMIN — RITUXIMAB-ABBS 700 MG: 10 INJECTION, SOLUTION INTRAVENOUS at 14:27

## 2023-12-12 RX ADMIN — DIPHENHYDRAMINE HYDROCHLORIDE 25 MG: 25 CAPSULE ORAL at 14:11

## 2023-12-12 ASSESSMENT — PAIN SCALES - GENERAL: PAINLEVEL: NO PAIN (0)

## 2023-12-12 NOTE — LETTER
12/12/2023         RE: Tricia Sosa  [id number]  [place] [id number]        Dear Colleague,    Thank you for referring your patient, Tricia Sosa, to the [facility] [place]. Please see a copy of my visit note below.    Oncology/Hematology Visit Note  Dec 12, 2023    Reason for Visit: Follow up of CVID, autoimmune hemolytic anemia, iron deficiency      HISTORY OF PRESENT ILLNESS: Tricia Sosa is a 83 year old female who presents with autoimmune hemolytic anemia and IgA deficiency.  She previously saw Dr. Matos and then Dr. Summers.  She was previously seen at [facility].     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  She was very sick at the time and had severe diarrhea.  She lost about 40 pounds in weight.  The workup revealed that she had markedly diminished CD4 counts of 48 and was diagnosed with CMV colitis.  Since then she has been followed in hematology clinic at [place] until recently.  She was noted to have anemia which was fairly long-standing.  She was initially referred for anemia in 2004 and also had a bone marrow biopsy done at that time which revealed hypercellular bone marrow with 70-80% cellularity and normal trilineage hematopoiesis and no morphologic features of MDS or lymphoproliferation.  Her anemia was attributed to her chronic underlying disease.  At the next evaluation in 2002 on 4 aggressive anemia there was no evidence of hemolysis, iron deficiency, B12 or folate deficiency.  Bone marrow biopsy was not pursued.  In summer of 2015 she had progressive fatigue, dyspnea on exertion and worsening anemia with a hemoglobin now of 9.  Workup at this time did suggest a hemolytic anemia with elevated LDH at 709, undetectable haptoglobin and elevated unconjugated bilirubin at 3.3.  Monospecific MARIKA was positive for both IgG and complement.  Cold agglutinin screen was positive with very low titer 1:64.  She was started  on prednisone 60 mg daily with supplementation of iron folate and B12 starting 7/31/15.  Her prednisone has been slowly tapered and was discontinued off in January 2016.  She was last followed at [facility] on March 10, 2016 when she had stable hemoglobin.     She was followed by Dr. Matos and Dr. Summers.  Due to relapse of hemolytic anemia, she underwent another course of prednisone that has since been tapered off and rituximab x 4 weekly doses completed in 9/19/19.     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  She started weekly Rituxan on 7/31/20 x 4 doses, completed on 8/21/20.  She tapered off of prednisone in October 2020.     Due to relapse of her AIHA, she started prednisone again on 6/8/21 at 60 mg daily with slow taper and finished taper in August 2021.     Due to relapse of AIHA again, she started prednisone again on 11/2/21 at 70 mg daily with slow taper.  She also completed weekly Rituxan again x 4 doses 12/6/21-12/29/21.     Venofer 5/5-5/19/22.     She is now on Rituxan every 2 months and IVIG monthly if IgG <600.    Interval History:  Tricia is overall doing OK. Still dealing with leg weakness and MRI per neurology with myositis, may need biopsy. She did have a fall a couple weeks ago, hurt her hip but improved now. She still has WARD, stable. Denies any fevers or recent infections. No side effects from Rituxan or IVIG. No bleeding concerns.     Current Outpatient Medications   Medication Sig Dispense Refill     cyanocobalamin (VITAMIN  B-12) 1000 MCG tablet   3     folic acid (FOLVITE) 1 MG tablet   3     ketoconazole (NIZORAL) 2 % external shampoo Use every other day to scalp as needed 120 mL 0     levothyroxine (SYNTHROID/LEVOTHROID) 150 MCG tablet Take one tablet  by mouth daily 90 tablet 2     triamcinolone (KENALOG) 0.1 % external ointment Apply topically 2 times daily         Past Medical History  Past Medical History:   Diagnosis  Date     WARD (dyspnea on exertion)      External hemorrhoids without mention of complication 6/27/05     Hemolytic anemia (H24)     autoimmune     Hypothyroidism      IgA deficiency (H)      Myopia of both eyes with astigmatism and presbyopia 8/16/2017     Other malaise and fatigue      Other severe protein-calorie malnutrition      Other vitreous opacities 12/19/2006     Thyroid disease      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    Hemorrhage Subdural Trauma Without LOC Active     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, and posterior vitreous detachment     Urinary tract infection, site not specified     Recurrent UTI's     Past Surgical History:   Procedure Laterality Date     COLONOSCOPY N/A 4/26/2016    Procedure: COLONOSCOPY;  Surgeon: Dontae Del Rio MD;  Location:  GI     ENT SURGERY  1985    Thyroid lobectomy     EYE SURGERY Bilateral 04/20/2021    Cataract Surgery     HC CYSTOSCOPY,INSERT URETERAL STENT      Ureteral stent insertion     HC FLEX SIGMOIDOSCOPY W BIOPSY  5/30/00     HC LAPAROSCOPY, SURGICAL; CHOLECYSTECTOMY  1992      THYROIDECTOMY       LIGATION OF HEMORRHOID(S)      Hemorrhoidectomy     UPPER GI ENDOSCOPY,BIOPSY  10/01/01     ZZC LIGATE FALLOPIAN TUBE       ZZHC COLONOSCOPY W BIOPSY  4/26/00     ZZHC COLONOSCOPY W BIOPSY  10/8/03     ZZHC COLONOSCOPY W BIOPSY  6/27/05    REPEAT IN 5 YEARS     Allergies   Allergen Reactions     Doxycycline      Social History   Social History     Tobacco Use     Smoking status: Never     Smokeless tobacco: Never   Vaping Use     Vaping Use: Never used   Substance Use Topics     Alcohol use: Yes     Alcohol/week: 0.0 standard drinks of alcohol     Comment: 1 a day at most     Drug use: No      Past medical history and social history were reviewed.    Physical Examination:  /76   Pulse 91   Temp 98.4  F (36.9  C) (Oral)   Resp 18   Wt 65.3 kg (144 lb)   LMP  (LMP Unknown)   SpO2 97%   BMI  19.53 kg/m    Wt Readings from Last 10 Encounters:   12/05/23 65.3 kg (144 lb)   11/24/23 66 kg (145 lb 9.6 oz)   11/14/23 63.5 kg (140 lb)   11/06/23 64.3 kg (141 lb 12.8 oz)   10/30/23 65.3 kg (144 lb)   10/18/23 65.5 kg (144 lb 6.4 oz)   10/17/23 66.2 kg (145 lb 14.4 oz)   09/29/23 66.2 kg (145 lb 14.4 oz)   08/22/23 65.8 kg (145 lb)   08/04/23 65.3 kg (144 lb)     Constitutional: Well-appearing female in no acute distress.  Eyes: EOMI, PERRL. No scleral icterus.  ENT: Oral mucosa is moist without lesions or thrush.   Lymphatic: Neck is supple without cervical or supraclavicular lymphadenopathy.   Cardiovascular: Regular rate and rhythm. No murmurs, gallops, or rubs. No peripheral edema.  Respiratory: Clear to auscultation bilaterally. No wheezes or crackles.  Neurologic: Cranial nerves II through XII are grossly intact.  Skin: No rashes, petechiae, or bruising noted on exposed skin.    Laboratory Data:   Latest Reference Range & Units 12/12/23 12:51   Sodium 135 - 145 mmol/L 139   Potassium 3.4 - 5.3 mmol/L 4.2   Chloride 98 - 107 mmol/L 104   Carbon Dioxide (CO2) 22 - 29 mmol/L 26   Urea Nitrogen 8.0 - 23.0 mg/dL 15.8   Creatinine 0.51 - 0.95 mg/dL 0.65   GFR Estimate >60 mL/min/1.73m2 87   Calcium 8.8 - 10.2 mg/dL 8.5 (L)   Anion Gap 7 - 15 mmol/L 9   Albumin 3.5 - 5.2 g/dL 4.4   Protein Total 6.4 - 8.3 g/dL 6.2 (L)   Alkaline Phosphatase 40 - 150 U/L 109   ALT 0 - 50 U/L 17   AST 0 - 45 U/L 26   Bilirubin Total <=1.2 mg/dL 0.6   Glucose 70 - 99 mg/dL 100 (H)   Iron 37 - 145 ug/dL 104   Iron Binding Capacity 240 - 430 ug/dL 309   Iron Sat Index 15 - 46 % 34   Lactate Dehydrogenase 0 - 250 U/L 225   WBC 4.0 - 11.0 10e3/uL 4.8   Hemoglobin 11.7 - 15.7 g/dL 11.7   Hematocrit 35.0 - 47.0 % 33.1 (L)   Platelet Count 150 - 450 10e3/uL 129 (L)   RBC Count 3.80 - 5.20 10e6/uL 3.29 (L)   MCV 78 - 100 fL 101 (H)   MCH 26.5 - 33.0 pg 35.6 (H)   MCHC 31.5 - 36.5 g/dL 35.3   RDW 10.0 - 15.0 % 13.8   % Neutrophils % 40   %  Lymphocytes % 50   % Monocytes % 10   % Eosinophils % 0   % Basophils % 0   Absolute Basophils 0.0 - 0.2 10e3/uL 0.0   Absolute Eosinophils 0.0 - 0.7 10e3/uL 0.0   Absolute Immature Granulocytes <=0.4 10e3/uL 0.0   Absolute Lymphocytes 0.8 - 5.3 10e3/uL 2.4   Absolute Monocytes 0.0 - 1.3 10e3/uL 0.5   % Immature Granulocytes % 0   Absolute Neutrophils 1.6 - 8.3 10e3/uL 1.9   Absolute NRBCs 10e3/uL 0.0   NRBCs per 100 WBC <1 /100 0   % Retic 0.5 - 2.0 % 1.4   Absolute Retic 0.025 - 0.095 10e6/uL 0.047   (L): Data is abnormally low  (H): Data is abnormally high      Assessment and Plan:  # 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 Has had multiple relapses, most recently November 2021. She completed prolonged prednisone taper and Rituxan. She is off prednisone now. Has bactrim when she is on prednisone. She is now on Rixuan every 2 months with stable labs  - CBC reviewed, hgb WNL. Mild thrombocytopenia stable  - Continue with Rituxan today  - Continue with Rituxan every 2 months with provider visit   - CBC monitoring monthly      # CVID  Follows with immunology as well.   - Doing IVIG monthly when IGG level is <600.   - Today pending, will monitor for results and schedule infusion if needed   - CD4 count was low, met with ID, no indication for PJP ppx at this time      # EVI  Prior ferritin was low at 6 in April 2022. S/p Vneofer x 3.   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        Sincerely,        CHELSEA Marlow

## 2023-12-12 NOTE — NURSING NOTE
Oncology Rooming Note    December 12, 2023 1:15 PM   Tricia Sosa is a 83 year old female who presents for:    Chief Complaint   Patient presents with    Oncology Clinic Visit     Initial Vitals: /76   Pulse 91   Temp 98.4  F (36.9  C) (Oral)   Resp 18   Wt 65.3 kg (144 lb)   LMP  (LMP Unknown)   SpO2 97%   BMI 19.53 kg/m   Estimated body mass index is 19.53 kg/m  as calculated from the following:    Height as of 12/5/23: 1.829 m (6').    Weight as of this encounter: 65.3 kg (144 lb). Body surface area is 1.82 meters squared.  No Pain (0) Comment: Data Unavailable   No LMP recorded (lmp unknown). Patient is postmenopausal.  Allergies reviewed: Yes  Medications reviewed: Yes    Medications: Medication refills not needed today.  Pharmacy name entered into "[employer]:    [place], [place] - [address] [id number] [place], [place] - [address] [facility]    Clinical concerns: follow up        Eunice Wiggins

## 2023-12-12 NOTE — PROGRESS NOTES
Nursing Note:  Tricia Sosa presents today for lab draw.    Patient seen by provider today: Yes: CHELSEA Marlow   present during visit today: Not Applicable.    Note: N/A.    Intravenous Access:  Lab draw site left Forearm, Needle type PIV, Gauge 22.  Labs drawn without difficulty.  Peripheral IV placed.    Discharge Plan:   Patient was sent to [facility] for provider appointment.    Pari Griffin RN

## 2023-12-12 NOTE — PROGRESS NOTES
Infusion Nursing Note:  Tricia Sosa presents today for C10D1 Rapid Rituxan.    Patient seen by provider today: Yes: Isaiah Man.   present during visit today: Not Applicable.    Note: Gave tylenol 325mg instead of 650mg and benadryl 25mg instead of 50mg per patient request.  She has done this previously and tolerated well.      Intravenous Access:  Peripheral IV placed in fast track.    Treatment Conditions:  Lab Results   Component Value Date    HGB 11.7 12/12/2023    WBC 4.8 12/12/2023    ANEU 6.8 07/06/2021    ANEUTAUTO 1.9 12/12/2023     (L) 12/12/2023        Lab Results   Component Value Date     12/12/2023    POTASSIUM 4.2 12/12/2023    MAG 2.00 06/08/2000    CR 0.65 12/12/2023    CULLEN 8.5 (L) 12/12/2023    BILITOTAL 0.6 12/12/2023    ALBUMIN 4.4 12/12/2023    ALT 17 12/12/2023    AST 26 12/12/2023       Results reviewed, labs MET treatment parameters, ok to proceed with treatment.      Post Infusion Assessment:  Patient tolerated infusion without incident.  Blood return noted pre and post infusion.  Site patent and intact, free from redness, edema or discomfort.  No evidence of extravasations.  Access discontinued per protocol.       Discharge Plan:   AVS to patient via MYCHART.     Patient discharged in stable condition accompanied by: self.  Departure Mode: Ambulatory.      Ally Adams RN

## 2023-12-13 ENCOUNTER — DOCUMENTATION ONLY (OUTPATIENT)
Dept: PHARMACY | Facility: CLINIC | Age: 83
End: 2023-12-13
Payer: MEDICARE

## 2023-12-13 LAB
FERRITIN SERPL-MCNC: 47 NG/ML (ref 11–328)
IGA SERPL-MCNC: <2 MG/DL (ref 84–499)
IGG SERPL-MCNC: 639 MG/DL (ref 610–1616)
IGM SERPL-MCNC: <10 MG/DL (ref 35–242)

## 2023-12-13 NOTE — PROGRESS NOTES
Pharmacist IVIG Stewardship Program    Diagnosis: Common Variable Immunodeficiency  Patient was originally diagnosed with [facility] in the 2000's and later transferred her care to [facility]   Date 5/27/2015   IgA Level  Undetectable   IgG Total 658   IgG2 106   IgG4 0.5   IgM Level 48     Current Dosing Regimen: Privigen 35g IV monthly.    Titrated down from every 3 week infusions to every 4 week infusions in 2017  Titrated down further from every 4 weeks to every 6 weeks infusions in March 2018, but patient declined drastically and had to be escalated back to monthly infusions in September 2018  Pre-medications:  Methylprednisolone 20 mg IV push prior to infusion  Current Titration Regimen: 0.5ml/kg/hr and increased by 0.5ml/kg/hr every 15 minutes, max rate 4ml/kg/hr.  Previously Tried Products: Gammagard SD, currently on Privigen    Side Effects: Patient denies side effects such as headaches, flushing, fever, muscle or joint pain, nausea, or rash/itching.    Interventions: Lab review completed.       Assessment:   Date  12/12/23   IgA <2   IgM <10   Patient's level is below standard range and she is appropriate for additional IVIG therapy. She is tolerating infusions well and IVIG infusions are effective in increasing Ig levels to an appropriate level to minimize patients risk of infection. Patient should continue on treatment as she has been per provider orders, without therapy her levels would drop below therapeutic range.    Plan: Patient is okay to proceed with infusions as planned per provider order until her next Immunoglobulin lab draw, tentatively scheduled for 1/2024.     Next Review Needed: 1/2024    Lynsey Narvaez, PharmD, IgCP  Medication Access Pharmacist

## 2023-12-15 LAB — LABCORP INTERFACED MISCELLANEOUS TEST RESULT: NORMAL

## 2023-12-18 ENCOUNTER — THERAPY VISIT (OUTPATIENT)
Dept: PHYSICAL THERAPY | Facility: CLINIC | Age: 83
End: 2023-12-18
Attending: INTERNAL MEDICINE
Payer: MEDICARE

## 2023-12-18 DIAGNOSIS — M62.81 GENERALIZED MUSCLE WEAKNESS: Primary | ICD-10-CM

## 2023-12-18 DIAGNOSIS — R53.83 OTHER FATIGUE: ICD-10-CM

## 2023-12-18 DIAGNOSIS — D59.10 AUTOIMMUNE HEMOLYTIC ANEMIA (H): ICD-10-CM

## 2023-12-18 PROCEDURE — 97110 THERAPEUTIC EXERCISES: CPT | Mod: GP | Performed by: PHYSICAL THERAPIST

## 2023-12-18 PROCEDURE — 97112 NEUROMUSCULAR REEDUCATION: CPT | Mod: GP | Performed by: PHYSICAL THERAPIST

## 2023-12-18 PROCEDURE — 97750 PHYSICAL PERFORMANCE TEST: CPT | Mod: GP | Performed by: PHYSICAL THERAPIST

## 2023-12-19 NOTE — PROGRESS NOTES
[facility]                                                                                   OUTPATIENT PHYSICAL THERAPY    PLAN OF TREATMENT FOR OUTPATIENT REHABILITATION   Patient's Last Name, First Name, Tricia Jc YOB: 1940   Provider's Name   [facility]   Medical Record No.  [id number]     Onset Date: 05/16/23 (date of order)  Start of Care Date: 06/13/23     Medical Diagnosis:  generalized muscle weakness, other fatigue, autoimmune hemolytic anemia      PT Treatment Diagnosis:  decreased force production muscles and decreased endurance , gait and balance impairments Plan of Treatment  Frequency/Duration: 1 session/ 8 days    Certification date from 12/11/23 to 12/18/23         See note for plan of treatment details and functional goals     Katya Betancourt, PT                         I CERTIFY THE NEED FOR THESE SERVICES FURNISHED UNDER        THIS PLAN OF TREATMENT AND WHILE UNDER MY CARE     (Physician attestation of this document indicates review and certification of the therapy plan).              Referring Provider:  Marley Heart    Initial Assessment  See Epic Evaluation- Start of Care Date: 06/13/23 12/18/23 0500   Progress Note/Certification   Start of Care Date 06/13/23   Onset of illness/injury or Date of Surgery 05/16/23  (date of order)   Therapy Frequency 1 session   Predicted Duration 8 days   Certification date from 12/11/23   Certification date to 12/18/23   GOALS   PT Goals 2;3;4;5;6   PT Goal 1   Goal Identifier HEP   Goal Description Tricia to be independent in an appropriate HEP to address her impairments safely and improve her overall function and well being.   Rationale to maximize safety and independence with performance of ADLs and functional tasks;to maximize safety and independence within the home;to maximize safety and independence within the community;to maximize safety and  independence with transportation;to maximize safety and independence with self cares   Goal Progress 9/18/23 reports doing her HEP but not everyday.  Sometimes combines exercises together. 12/18/23 no questions on HEP.   Target Date 12/18/23   PT Goal 2   Goal Identifier balance   Goal Description Tricia to  rhomberg with EC on regular surface x 20 sec and on conforming surface/foam x 12 sec or greater to demonstate improved balance for all activities.   Rationale to maximize safety and independence with performance of ADLs and functional tasks;to maximize safety and independence within the home;to maximize safety and independence within the community;to maximize safety and independence with transportation;to maximize safety and independence with self cares   Goal Progress eval 6/13/23 on regular kaylee 5 sec, on foam 1 sec  9/18/23 on regular kaylee EC 11 sec, on foam 1.5 sec.   11/28/23  14 sec with close SBA with EC   EO stopped her at 31 sec.  12/18/23  on regular kaylee  7.9 sec and 10.54 sec second time  on foam  EO 21 sec   EC  1.53 sec   Target Date 12/18/23   PT Goal 3   Goal Identifier FGA   Goal Description Tricia to increase her FGA score from 14/30 at eval to 22 or greater to demonstrate improved functional balance and strength for decreased fall risk and improved function in home and community.   Rationale to maximize safety and independence with performance of ADLs and functional tasks;to maximize safety and independence within the home;to maximize safety and independence within the community;to maximize safety and independence with transportation;to maximize safety and independence with self cares   Goal Progress 9/18/23  score of 15/30   Target Date 12/18/23   PT Goal 4   Goal Identifier functional endurance   Goal Description Tricia to improve her 6MWT distance by 75 meters or greater to demonstrate improved functional endurance to allow for better tolerance of home activities, community  mobility.   Rationale to maximize safety and independence with performance of ADLs and functional tasks;to maximize safety and independence within the home;to maximize safety and independence within the community;to maximize safety and independence with transportation;to maximize safety and independence with self cares   Goal Progress 7/12/23 performed with SBA , without AD, occasionally touching wall, trendelenburg L, total distance 616 ft, OMNI 4-6, HR at start 72 bpm after 80 bpm, sats 96% before and 97% after.  9/18/23   sats 98%, HR 78 bpm, BP automatic L /77  touch of wall x 1, distance 564 feet no device, SBA. After sats 97%, HR 76 bpm, /68  OMNI moderate.   Target Date 12/18/23   PT Goal 5   Goal Identifier hip abduction in sidelying   Goal Description Tricia to increase her hip abduction to 4-5/5 B to allow for improved balance, functional strength, gait pattern and overall safety.   Rationale to maximize safety and independence with performance of ADLs and functional tasks;to maximize safety and independence within the home;to maximize safety and independence within the community;to maximize safety and independence with transportation;to maximize safety and independence with self cares   Goal Progress 4/5 L , R 4+/5 on 9/7/23   Target Date 09/11/23   Date Met 09/07/23   PT Goal 6   Goal Identifier LE functional strength   Goal Description Tricia to perform sit to stand to 18 inch chair with controlled descent for greater safety with transfers, decreased fall risk and improved overall mobility   Rationale to maximize safety and independence with performance of ADLs and functional tasks;to maximize safety and independence within the home;to maximize safety and independence within the community;to maximize safety and independence with transportation;to maximize safety and independence with self cares   Goal Progress eval - uncontrolled descent in to 18 inch chair. 9/18/23 continues to need assist  from UE to control descent.   Target Date 12/18/23

## 2023-12-27 ASSESSMENT — ACTIVITIES OF DAILY LIVING (ADL)
GO DOWN STAIRS: ACTIVITY IS FAIRLY DIFFICULT
WEAKNESS: THE SYMPTOM AFFECTS MY ACTIVITY MODERATELY
GIVING WAY, BUCKLING OR SHIFTING OF KNEE: THE SYMPTOM AFFECTS MY ACTIVITY SLIGHTLY
GIVING WAY, BUCKLING OR SHIFTING OF KNEE: THE SYMPTOM AFFECTS MY ACTIVITY SLIGHTLY
SWELLING: I DO NOT HAVE THE SYMPTOM
PAIN: I DO NOT HAVE THE SYMPTOM
SWELLING: I DO NOT HAVE THE SYMPTOM
STIFFNESS: I DO NOT HAVE THE SYMPTOM
KNEE_ACTIVITY_OF_DAILY_LIVING_SUM: 34
KNEEL ON THE FRONT OF YOUR KNEE: I AM UNABLE TO DO THE ACTIVITY
PLEASE_INDICATE_YOR_PRIMARY_REASON_FOR_REFERRAL_TO_THERAPY:: KNEE
HOW_WOULD_YOU_RATE_THE_OVERALL_FUNCTION_OF_YOUR_KNEE_DURING_YOUR_USUAL_DAILY_ACTIVITIES?: NEARLY NORMAL
STIFFNESS: I DO NOT HAVE THE SYMPTOM
LIMPING: I DO NOT HAVE THE SYMPTOM
KNEEL ON THE FRONT OF YOUR KNEE: I AM UNABLE TO DO THE ACTIVITY
GO DOWN STAIRS: ACTIVITY IS FAIRLY DIFFICULT
WALK: ACTIVITY IS SOMEWHAT DIFFICULT
STAND: ACTIVITY IS FAIRLY DIFFICULT
SQUAT: I AM UNABLE TO DO THE ACTIVITY
WEAKNESS: THE SYMPTOM AFFECTS MY ACTIVITY MODERATELY
HOW_WOULD_YOU_RATE_THE_OVERALL_FUNCTION_OF_YOUR_KNEE_DURING_YOUR_USUAL_DAILY_ACTIVITIES?: NEARLY NORMAL
GO UP STAIRS: ACTIVITY IS FAIRLY DIFFICULT
PAIN: I DO NOT HAVE THE SYMPTOM
STAND: ACTIVITY IS FAIRLY DIFFICULT
LIMPING: I DO NOT HAVE THE SYMPTOM
SQUAT: I AM UNABLE TO DO THE ACTIVITY
GO UP STAIRS: ACTIVITY IS FAIRLY DIFFICULT
WALK: ACTIVITY IS SOMEWHAT DIFFICULT

## 2023-12-29 ENCOUNTER — MYC MEDICAL ADVICE (OUTPATIENT)
Dept: ONCOLOGY | Facility: CLINIC | Age: 83
End: 2023-12-29
Payer: MEDICARE

## 2023-12-30 LAB — LABCORP INTERFACED MISCELLANEOUS TEST RESULT: NORMAL

## 2024-01-02 ENCOUNTER — THERAPY VISIT (OUTPATIENT)
Dept: PHYSICAL THERAPY | Facility: CLINIC | Age: 84
End: 2024-01-02
Attending: STUDENT IN AN ORGANIZED HEALTH CARE EDUCATION/TRAINING PROGRAM
Payer: MEDICARE

## 2024-01-02 DIAGNOSIS — G89.29 CHRONIC PAIN OF LEFT KNEE: ICD-10-CM

## 2024-01-02 DIAGNOSIS — D75.89 BONE MARROW HYPERPLASIA: ICD-10-CM

## 2024-01-02 DIAGNOSIS — M25.562 CHRONIC PAIN OF LEFT KNEE: ICD-10-CM

## 2024-01-02 DIAGNOSIS — M25.512 CHRONIC LEFT SHOULDER PAIN: Primary | ICD-10-CM

## 2024-01-02 DIAGNOSIS — D47.Z9 OTHER SPECIFIED NEOPLASMS OF UNCERTAIN BEHAVIOR OF LYMPHOID, HEMATOPOIETIC AND RELATED TISSUE (H): ICD-10-CM

## 2024-01-02 DIAGNOSIS — D59.10 AUTOIMMUNE HEMOLYTIC ANEMIA (H): Primary | ICD-10-CM

## 2024-01-02 DIAGNOSIS — G89.29 CHRONIC LEFT SHOULDER PAIN: Primary | ICD-10-CM

## 2024-01-02 PROCEDURE — 97161 PT EVAL LOW COMPLEX 20 MIN: CPT | Mod: GP | Performed by: PHYSICAL THERAPIST

## 2024-01-02 PROCEDURE — 97110 THERAPEUTIC EXERCISES: CPT | Mod: GP | Performed by: PHYSICAL THERAPIST

## 2024-01-02 NOTE — PROGRESS NOTES
DISCHARGE: Eval and single PT visits   Reason for Discharge: establishment of HEP and single visit       01/02/24 0500   PT Goal 1   Goal Identifier HEP   Goal Description Patient will demonstrate understanding and independence with HEP in order to independently continue HEP.   Rationale to maximize safety and independence with performance of ADLs and functional tasks;to maximize safety and independence within the home;to maximize safety and independence within the community;to maximize safety and independence with self cares   Goal Progress throughout session noted understanding of HEP given with written insturctions, verbal cues, and demonstration.   Target Date 01/02/24   Date Met 01/02/24       Discharge Plan: Patient to continue home program independently.     Referring Provider:  Rashid Cardoso

## 2024-01-02 NOTE — PROGRESS NOTES
PHYSICAL THERAPY EVALUATION  Type of Visit: Evaluation    See electronic medical record for Abuse and Falls Screening details.    Subjective       Presenting condition or subjective complaint: L shoulder pain when sleeping; she notes that her discomfort in her L knee has decreased and is not a concern for her. She would like a single visit eval and tx for her L shoulder due to increased number of appts recently and difficulty with transportation.  Date of onset: 10/30/23 (date of order used, due to pt report of insidious onset of symptoms)    Relevant medical history: Anemia; Arthritis; Dizziness; Hearing problems; Severe dizziness; Significant weakness; Sleep disorder like apnea; Thyroid problems; Vision problems   Dates & types of surgery:      Prior diagnostic imaging/testing results: MRI   X-Ray  Shoulder X-Ray on 10/30/23:  Narrative & Impression   XR SHOULDER BILATERAL G/E 2 VIEWS 10/30/2023 2:45 PM      HISTORY: Bilateral shoulder pain; Bilateral shoulder pain  COMPARISON: None.  IMPRESSION: Normal glenohumeral alignment bilaterally. Moderate  degenerative changes in the acromioclavicular joints bilaterally. No  fractures are evident.      Prior therapy history for the same diagnosis, illness or injury: Yes Neuro therapy just finished in December.    Prior Level of Function: mostly independent    Living Environment  Social support: With family members   Type of home: House; 2-story; Basement   Stairs to enter the home: Yes 6 Is there a railing: Yes   Ramp: No   Stairs inside the home: Yes 14 Is there a railing: Yes   Help at home: Self Cares (home health aide/personal care attendant, family, etc); Home management tasks (cooking, cleaning); Home and Yard maintenance tasks; Assist for driving and community activities  Equipment owned: Straight Cane; Four-point cane; Walker with wheels; Grab bars; Raised toilet seat; Bath bench; Lift chair     Employment: No    Hobbies/Interests: gardening ,  [employer]  sales    Patient goals for therapy: Stand straight without a huge effort and strain and walk easily without straining.    Pain assessment: Pain present in L shoulder when sleeping     Objective   SHOULDER EVALUATION  POSTURE:  able to correct in seated to proper upright posture  ROM: AROM WFL; discomfort mild limitation in L shoulder abduction 150 deg still WFL  STRENGTH:  L shoulder abduction limited 4+/5 due to discomfort, L shoulder ER 5-/5 due to discomfort  FLEXIBILITY:  WFL  SPECIAL TESTS: negative belly press, lift off, biceps, empty can more discomfort compared to full can  PALPATION:  TTP to L shoulder at biceps and posterior shoulder at teres minor/major  CERVICAL SCREEN: WFL    Assessment & Plan   CLINICAL IMPRESSIONS  Medical Diagnosis: L shoulder pain    Treatment Diagnosis: L shoulder pain   Impression/Assessment: Patient is a 83 year old female with L shoulder complaints of discomfort primarily when sleeping.  The following significant findings have been identified: Pain, Decreased ROM/flexibility, and Decreased strength. These impairments interfere with their ability to perform self care tasks, work tasks, and household chores as compared to previous level of function.     Clinical Decision Making (Complexity):  Clinical Presentation: Stable/Uncomplicated  Clinical Presentation Rationale: based on medical and personal factors listed in PT evaluation  Clinical Decision Making (Complexity): Low complexity    PLAN OF CARE  Treatment Interventions:  Interventions: Manual Therapy, Neuromuscular Re-education, Therapeutic Activity, Therapeutic Exercise    Long Term Goals     PT Goal 1  Goal Identifier: HEP  Goal Description: Patient will demonstrate understanding and independence with HEP in order to independently continue HEP.  Rationale: to maximize safety and independence with performance of ADLs and functional tasks;to maximize safety and independence within the home;to maximize safety and independence  within the community;to maximize safety and independence with self cares  Goal Progress: throughout session noted understanding of HEP given with written insturctions, verbal cues, and demonstration.  Target Date: 01/02/24  Date Met: 01/02/24      Frequency of Treatment: 1x visit  Duration of Treatment: 1 day    Recommended Referrals to Other Professionals:   Education Assessment:   Learner/Method: Patient;No Barriers to Learning  Education Comments: no concerns    Risks and benefits of evaluation/treatment have been explained.   Patient/Family/caregiver agrees with Plan of Care.     Evaluation Time:           Signing Clinician: Mahnaz Oates, PT      [facility]                                                                                   OUTPATIENT PHYSICAL THERAPY      PLAN OF TREATMENT FOR OUTPATIENT REHABILITATION   Patient's Last Name, First Name, GRISELDATricia Sun YOB: 1940   Provider's Name   [facility]   Medical Record No.  [id number]     Onset Date: 10/30/23 (date of order used, due to pt report of insidious onset of symptoms)  Start of Care Date: 01/02/24     Medical Diagnosis:  L shoulder pain      PT Treatment Diagnosis:  L shoulder pain Plan of Treatment  Frequency/Duration: 1x visit/ 1 day    Certification date from 01/02/24 to 01/02/24         See note for plan of treatment details and functional goals     Mahnaz Oates, PT                         I CERTIFY THE NEED FOR THESE SERVICES FURNISHED UNDER        THIS PLAN OF TREATMENT AND WHILE UNDER MY CARE     (Physician attestation of this document indicates review and certification of the therapy plan).              Referring Provider:  Rashid Cardoso    Initial Assessment  See Epic Evaluation- Start of Care Date: 01/02/24

## 2024-01-04 ENCOUNTER — TELEPHONE (OUTPATIENT)
Dept: SURGERY | Facility: CLINIC | Age: 84
End: 2024-01-04
Payer: MEDICARE

## 2024-01-04 ENCOUNTER — PREP FOR PROCEDURE (OUTPATIENT)
Dept: SURGERY | Facility: CLINIC | Age: 84
End: 2024-01-04
Payer: MEDICARE

## 2024-01-04 ENCOUNTER — TELEPHONE (OUTPATIENT)
Dept: INTERNAL MEDICINE | Facility: CLINIC | Age: 84
End: 2024-01-04
Payer: MEDICARE

## 2024-01-04 DIAGNOSIS — M60.9 MYOSITIS OF RIGHT LOWER EXTREMITY, UNSPECIFIED MYOSITIS TYPE: Primary | ICD-10-CM

## 2024-01-04 NOTE — TELEPHONE ENCOUNTER
Type of surgery: MUSCLE BIOPSY, RIGHT THIGH  Location of surgery: [facility]  Date and time of surgery: 1-16-24, 7:50 AM  Surgeon: DR GEE  Pre-Op Appt Date: PATIENT TO SCHEDULE   Post-Op Appt Date: NA    Packet sent out: YES  Pre-cert/Authorization completed:  Not Applicable  Date: 1-4-24        MUSCLE BIOPSY, RIGHT THIGH GENERAL PT INST TO HAVE H&P 60 MINS REQ PA ASSIST RTC [facility]W

## 2024-01-04 NOTE — TELEPHONE ENCOUNTER
Reason for Call:  Appointment Request    Patient requesting this type of appt: Pre-op    Requested provider:  joselyn morales    Reason patient unable to be scheduled: Not within requested timeframe    When does patient want to be seen/preferred time:  before 1/16    Comments: patient called and is having surgery on 1/16 and needs a pre op requesting work in she is requesting Joselyn Morales if possible    Could we send this information to you in Health system or would you prefer to receive a phone call?:   Patient would prefer a phone call   Okay to leave a detailed message?: Yes at Home number on file [phone] (home)    Call taken on 1/4/2024 at 10:20 AM by Anabell Bradley

## 2024-01-05 ENCOUNTER — LAB (OUTPATIENT)
Dept: ONCOLOGY | Facility: CLINIC | Age: 84
End: 2024-01-05
Attending: INTERNAL MEDICINE
Payer: MEDICARE

## 2024-01-05 VITALS
BODY MASS INDEX: 20.07 KG/M2 | RESPIRATION RATE: 16 BRPM | HEART RATE: 74 BPM | WEIGHT: 148.2 LBS | SYSTOLIC BLOOD PRESSURE: 136 MMHG | DIASTOLIC BLOOD PRESSURE: 66 MMHG | TEMPERATURE: 98.6 F | HEIGHT: 72 IN | OXYGEN SATURATION: 95 %

## 2024-01-05 DIAGNOSIS — D59.10 AUTOIMMUNE HEMOLYTIC ANEMIA (H): ICD-10-CM

## 2024-01-05 DIAGNOSIS — E03.9 ACQUIRED HYPOTHYROIDISM: ICD-10-CM

## 2024-01-05 LAB
ALBUMIN SERPL BCG-MCNC: 4.6 G/DL (ref 3.5–5.2)
ALP SERPL-CCNC: 114 U/L (ref 40–150)
ALT SERPL W P-5'-P-CCNC: 18 U/L (ref 0–50)
ANION GAP SERPL CALCULATED.3IONS-SCNC: 7 MMOL/L (ref 7–15)
AST SERPL W P-5'-P-CCNC: 28 U/L (ref 0–45)
BASOPHILS # BLD AUTO: 0 10E3/UL (ref 0–0.2)
BASOPHILS NFR BLD AUTO: 0 %
BILIRUB SERPL-MCNC: 0.5 MG/DL
BUN SERPL-MCNC: 16.7 MG/DL (ref 8–23)
CALCIUM SERPL-MCNC: 8.5 MG/DL (ref 8.8–10.2)
CHLORIDE SERPL-SCNC: 104 MMOL/L (ref 98–107)
CREAT SERPL-MCNC: 0.61 MG/DL (ref 0.51–0.95)
DEPRECATED HCO3 PLAS-SCNC: 27 MMOL/L (ref 22–29)
EGFRCR SERPLBLD CKD-EPI 2021: 88 ML/MIN/1.73M2
EOSINOPHIL # BLD AUTO: 0 10E3/UL (ref 0–0.7)
EOSINOPHIL NFR BLD AUTO: 0 %
ERYTHROCYTE [DISTWIDTH] IN BLOOD BY AUTOMATED COUNT: 13.5 % (ref 10–15)
GLUCOSE SERPL-MCNC: 114 MG/DL (ref 70–99)
HAPTOGLOB SERPL-MCNC: NORMAL MG/DL
HCT VFR BLD AUTO: 36.3 % (ref 35–47)
HGB BLD-MCNC: 12.4 G/DL (ref 11.7–15.7)
IMM GRANULOCYTES # BLD: 0 10E3/UL
IMM GRANULOCYTES NFR BLD: 0 %
LDH SERPL L TO P-CCNC: 244 U/L (ref 0–250)
LYMPHOCYTES # BLD AUTO: 2 10E3/UL (ref 0.8–5.3)
LYMPHOCYTES NFR BLD AUTO: 44 %
MCH RBC QN AUTO: 35 PG (ref 26.5–33)
MCHC RBC AUTO-ENTMCNC: 34.2 G/DL (ref 31.5–36.5)
MCV RBC AUTO: 103 FL (ref 78–100)
MONOCYTES # BLD AUTO: 0.4 10E3/UL (ref 0–1.3)
MONOCYTES NFR BLD AUTO: 8 %
NEUTROPHILS # BLD AUTO: 2.1 10E3/UL (ref 1.6–8.3)
NEUTROPHILS NFR BLD AUTO: 48 %
NRBC # BLD AUTO: 0 10E3/UL
NRBC BLD AUTO-RTO: 0 /100
PLATELET # BLD AUTO: 130 10E3/UL (ref 150–450)
POTASSIUM SERPL-SCNC: 4.2 MMOL/L (ref 3.4–5.3)
PROT SERPL-MCNC: 6.5 G/DL (ref 6.4–8.3)
RBC # BLD AUTO: 3.54 10E6/UL (ref 3.8–5.2)
RETICS # AUTO: 0.06 10E6/UL (ref 0.03–0.1)
RETICS/RBC NFR AUTO: 1.7 % (ref 0.5–2)
SODIUM SERPL-SCNC: 138 MMOL/L (ref 135–145)
WBC # BLD AUTO: 4.5 10E3/UL (ref 4–11)

## 2024-01-05 PROCEDURE — 83615 LACTATE (LD) (LDH) ENZYME: CPT | Performed by: INTERNAL MEDICINE

## 2024-01-05 PROCEDURE — 85045 AUTOMATED RETICULOCYTE COUNT: CPT | Performed by: INTERNAL MEDICINE

## 2024-01-05 PROCEDURE — 85025 COMPLETE CBC W/AUTO DIFF WBC: CPT | Performed by: INTERNAL MEDICINE

## 2024-01-05 PROCEDURE — 99214 OFFICE O/P EST MOD 30 MIN: CPT | Performed by: INTERNAL MEDICINE

## 2024-01-05 PROCEDURE — 36415 COLL VENOUS BLD VENIPUNCTURE: CPT

## 2024-01-05 PROCEDURE — G0463 HOSPITAL OUTPT CLINIC VISIT: HCPCS | Performed by: INTERNAL MEDICINE

## 2024-01-05 PROCEDURE — 83010 ASSAY OF HAPTOGLOBIN QUANT: CPT | Performed by: INTERNAL MEDICINE

## 2024-01-05 PROCEDURE — 82784 ASSAY IGA/IGD/IGG/IGM EACH: CPT | Performed by: INTERNAL MEDICINE

## 2024-01-05 PROCEDURE — 80053 COMPREHEN METABOLIC PANEL: CPT | Performed by: INTERNAL MEDICINE

## 2024-01-05 ASSESSMENT — PAIN SCALES - GENERAL: PAINLEVEL: NO PAIN (0)

## 2024-01-05 NOTE — PROGRESS NOTES
[facility]    Hematology/Oncology Established Patient Follow-up Note    Treatment Summary:      Today's Date: 10/17/23    Reason for Follow-up: Hemolytic anemia-autoimmune; IgA deficiency     HISTORY OF PRESENT ILLNESS: Tricia Sosa is an 82 year old female who presents with autoimmune hemolytic anemia and IgA deficiency.  She previously saw Dr. Matos and then Dr. Summers.  She was previously seen at [facility].     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  She was very sick at the time and had severe diarrhea.  She lost about 40 pounds in weight.  The workup revealed that she had markedly diminished CD4 counts of 48 and was diagnosed with CMV colitis.  Since then she has been followed in hematology clinic at [place] until recently.  She was noted to have anemia which was fairly long-standing.  She was initially referred for anemia in 2004 and also had a bone marrow biopsy done at that time which revealed hypercellular bone marrow with 70-80% cellularity and normal trilineage hematopoiesis and no morphologic features of MDS or lymphoproliferation.  Her anemia was attributed to her chronic underlying disease.  At the next evaluation in 2002 on 4 aggressive anemia there was no evidence of hemolysis, iron deficiency, B12 or folate deficiency.  Bone marrow biopsy was not pursued.  In summer of 2015 she had progressive fatigue, dyspnea on exertion and worsening anemia with a hemoglobin now of 9.  Workup at this time did suggest a hemolytic anemia with elevated LDH at 709, undetectable haptoglobin and elevated unconjugated bilirubin at 3.3.  Monospecific MARIKA was positive for both IgG and complement.  Cold agglutinin screen was positive with very low titer 1:64.  She was started on prednisone 60 mg daily with supplementation of iron folate and B12 starting 7/31/15.  Her prednisone has been slowly tapered and was discontinued off in January 2016.  She was  last followed at [facility] on March 10, 2016 when she had stable hemoglobin.     She was followed by Dr. Matos and Dr. Summers.  Due to relapse of hemolytic anemia, she underwent another course of prednisone that has since been tapered off and rituximab x 4 weekly doses completed in 9/19/19.     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  She started weekly Rituxan on 7/31/20 x 4 doses, completed on 8/21/20.  She tapered off of prednisone in October 2020.     Due to relapse of her AIHA, she started prednisone again on 6/8/21 at 60 mg daily with slow taper and finished taper in August 2021.     Due to relapse of AIHA again, she started prednisone again on 11/2/21 at 70 mg daily with slow taper.  She also completed weekly Rituxan again x 4 doses 12/6/21-12/29/21.    Venofer 5/5-5/19/22.      INTERIM HISTORY:  Patient continues to do well.  She continues rituximab on maintenance every 2 months. She continues on IVIG as well.    No weight loss, fatigue, LAD, night sweats, fever. No gross evidence of bleed. She states she will not have cyst removal at this time due to too many appointments.     She had MRI recently done showing heterogenous appearance of the pelvis and femur.       REVIEW OF SYSTEMS:   A 14 point ROS was reviewed with pertinent positives and negatives in the HPI.       HOME MEDICATIONS:  Current Outpatient Medications   Medication Sig Dispense Refill    cyanocobalamin (VITAMIN  B-12) 1000 MCG tablet   3    folic acid (FOLVITE) 1 MG tablet   3    ketoconazole (NIZORAL) 2 % external shampoo Use every other day to scalp as needed 120 mL 0    levothyroxine (SYNTHROID/LEVOTHROID) 150 MCG tablet Take one tablet  by mouth daily 90 tablet 2    triamcinolone (KENALOG) 0.1 % external ointment Apply topically 2 times daily           ALLERGIES:  Allergies   Allergen Reactions    Doxycycline          PAST MEDICAL HISTORY:  Past Medical History:   Diagnosis  Date    WARD (dyspnea on exertion)     External hemorrhoids without mention of complication 6/27/05    Hemolytic anemia (H24)     autoimmune    Hypothyroidism     IgA deficiency (H)     Myopia of both eyes with astigmatism and presbyopia 8/16/2017    Other malaise and fatigue     Other severe protein-calorie malnutrition     Other vitreous opacities 12/19/2006    Thyroid disease     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    Hemorrhage Subdural Trauma Without LOC Active    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, and posterior vitreous detachment    Urinary tract infection, site not specified     Recurrent UTI's         PAST SURGICAL HISTORY:  Past Surgical History:   Procedure Laterality Date    COLONOSCOPY N/A 4/26/2016    Procedure: COLONOSCOPY;  Surgeon: Dontae Del Rio MD;  Location:  GI    ENT SURGERY  1985    Thyroid lobectomy    EYE SURGERY Bilateral 04/20/2021    Cataract Surgery    HC CYSTOSCOPY,INSERT URETERAL STENT      Ureteral stent insertion    HC FLEX SIGMOIDOSCOPY W BIOPSY  5/30/00    HC LAPAROSCOPY, SURGICAL; CHOLECYSTECTOMY  1992     THYROIDECTOMY      LIGATION OF HEMORRHOID(S)      Hemorrhoidectomy    UPPER GI ENDOSCOPY,BIOPSY  10/01/01    ZZC LIGATE FALLOPIAN TUBE      ZZHC COLONOSCOPY W BIOPSY  4/26/00    ZZHC COLONOSCOPY W BIOPSY  10/8/03    ZZHC COLONOSCOPY W BIOPSY  6/27/05    REPEAT IN 5 YEARS         SOCIAL HISTORY:  Social History     Socioeconomic History    Marital status:      Spouse name: Not on file    Number of children: Not on file    Years of education: Not on file    Highest education level: Not on file   Occupational History    Not on file   Tobacco Use    Smoking status: Never    Smokeless tobacco: Never   Vaping Use    Vaping Use: Never used   Substance and Sexual Activity    Alcohol use: Yes     Alcohol/week: 0.0 standard drinks of alcohol     Comment: 1 a day at most    Drug use: No    Sexual activity: Not  Currently     Partners: Male   Other Topics Concern    Parent/sibling w/ CABG, MI or angioplasty before 65F 55M? No   Social History Narrative    Not on file     Social Determinants of Health     Financial Resource Strain: Low Risk  (2023)    Financial Resource Strain     Within the past 12 months, have you or your family members you live with been unable to get utilities (heat, electricity) when it was really needed?: No   Food Insecurity: Low Risk  (2023)    Food Insecurity     Within the past 12 months, did you worry that your food would run out before you got money to buy more?: No     Within the past 12 months, did the food you bought just not last and you didn t have money to get more?: No   Transportation Needs: Low Risk  (2023)    Transportation Needs     Within the past 12 months, has lack of transportation kept you from medical appointments, getting your medicines, non-medical meetings or appointments, work, or from getting things that you need?: No   Physical Activity: Not on file   Stress: Not on file   Social Connections: Not on file   Interpersonal Safety: Low Risk  (2023)    Interpersonal Safety     Do you feel physically and emotionally safe where you currently live?: Yes     Within the past 12 months, have you been hit, slapped, kicked or otherwise physically hurt by someone?: No     Within the past 12 months, have you been humiliated or emotionally abused in other ways by your partner or ex-partner?: No   Housing Stability: Low Risk  (2023)    Housing Stability     Do you have housing? : Yes     Are you worried about losing your housing?: No         FAMILY HISTORY:  Family History   Problem Relation Age of Onset    Colon Cancer Mother 90    Cerebrovascular Disease Father          at age 85    Dementia Brother     Prostate Cancer Brother     Thyroid Disease Brother     Dementia Brother     Thyroid Disease Brother     Pancreatic Cancer Brother     Breast Cancer Sister      Hyperlipidemia Sister     Depression Sister     Anxiety Disorder Sister     Thyroid Disease Sister     Breast Cancer Sister     Colon Cancer Niece     Other Cancer Niece         leukemia         PHYSICAL EXAM:  LMP  (LMP Unknown)   PHYSICAL EXAMINATION:     GENERAL/CONSTITUTIONAL: No acute distress.  RESPIRATORY: Clear to auscultation bilaterally. No crackles or wheezing.   CARDIOVASCULAR: Regular rate and rhythm without murmurs, gallops, or rubs.  MUSCULOSKELETAL: Warm and well-perfused, no cyanosis, clubbing, or edema.  NEUROLOGIC: Cranial nerves II-XII are intact. Alert, oriented, answers questions appropriately.  INTEGUMENTARY: No rashes or jaundice.  GAIT: Steady, does not use assistive device.      LABS: Reviewed with patient today.   Latest Reference Range & Units 01/05/24 14:49   WBC 4.0 - 11.0 10e3/uL 4.5   Hemoglobin 11.7 - 15.7 g/dL 12.4   Hematocrit 35.0 - 47.0 % 36.3   Platelet Count 150 - 450 10e3/uL 130 (L)   RBC Count 3.80 - 5.20 10e6/uL 3.54 (L)   MCV 78 - 100 fL 103 (H)   MCH 26.5 - 33.0 pg 35.0 (H)   MCHC 31.5 - 36.5 g/dL 34.2   RDW 10.0 - 15.0 % 13.5   % Neutrophils % 48   % Lymphocytes % 44   % Monocytes % 8   % Eosinophils % 0   % Basophils % 0   Absolute Basophils 0.0 - 0.2 10e3/uL 0.0   Absolute Eosinophils 0.0 - 0.7 10e3/uL 0.0   Absolute Immature Granulocytes <=0.4 10e3/uL 0.0   Absolute Lymphocytes 0.8 - 5.3 10e3/uL 2.0   Absolute Monocytes 0.0 - 1.3 10e3/uL 0.4   % Immature Granulocytes % 0   Absolute Neutrophils 1.6 - 8.3 10e3/uL 2.1   Absolute NRBCs 10e3/uL 0.0   NRBCs per 100 WBC <1 /100 0   % Retic 0.5 - 2.0 % 1.7   Absolute Retic 0.025 - 0.095 10e6/uL 0.060   (L): Data is abnormally low  (H): Data is abnormally high        IMAGING:  BILATERAL FULL FIELD DIGITAL SCREENING MAMMOGRAM WITH TOMOSYNTHESIS     Performed on: 6/23/22     IMPRESSION: ACR BI-RADS Category 1: Negative     RECOMMENDED FOLLOW-UP: Annual routine screening mammogram      MR Femur 12/12/23:  MR Femur Thigh Left  w/o & w Contrast  Order: [id number]  Impression     Diffuse intramuscular and perimyofascial edema in the lower extremities, preferentially in the mid to distal thighs and posterior compartment musculature with minimal to no enhancement. 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 Typically a greater degree of atrophy and involvement of the anterior muscular group would be expected with inclusion body myositis, however the distal predominant component is more common with inclusion body myositis than polymyositis.    Heterogeneous appearance of the marrow of the pelvis and proximal to mid femurs may be on the basis of robust marrow hyperplasia, however given the degree of heterogeneity, recommend appropriate clinical evaluation to exclude underlying myelodysplastic or lymphoproliferative disorder. No pelvic adenopathy visualized.       ASSESSMENT/PLAN:  Tricia Sosa is an 82 year old female with:        Warm autoimmune hemolytic anemia (positive MARIKA to IgG and complement)  Positive cold agglutinin screen with low cold agglutinin titers  Common variable immunodeficiency disorder  Splenomegaly           1) Autoimmune hemolytic anemia: She had relapse in July 2020, and started on prednisone, as well as weekly rituximab infusions x 4, completed on 8/21/20.  Since then, she has been off and on prednisone and rituximab, getting response each time, but relapses are getting more frequent.       She does not want to consider splenectomy yet.      She is completing another 4 doses of weekly rituxumab in May 2022, and then will go on maintenance rituximab to try to maintain her hemoglobin longer.  She wants to try to avoid having to go back on steroids if possible. She previously tapered off of prednisone again as of early March 2022. Off bactrim as well.    -Hemoglobin is stable in the 12's.  -Monitor CBC monthly.  -Continue maintenance rituximab every 2 months.    -She has been  discovered to have abnormal marrow signal on MRI. She had bone marrow biopsy in the past with her original diagnosis (see HPI). Sent for PET/CT to assess for any abnormal marrow enhancement, LAD, or mass. Will follow up on results with possibility of bone marrow biopsy +/- LN biopsy.     2) CVID:   -Continue IVIG.  She gets it, if IgG is <600.   -IgG check and IVIG every 4 weeks if meets parameters  -She saw immunology in [place] who recommended IVIG monthly, but our financial team continues to check and says that it is only covered with IgG is <600.      3) Iron-deficiency anemia  -s/p Venofer in May 2022.     4) Thrombocytopenia  -Waxing/waning.  -Monitor.    5) Left neck cyst  -Should be okay for removal so long as platelets are stable.     6) -Labs reviewed. CMP pending. Otherwise, okay for treatment IVIG 1/9/24.  -Alternate follow up between FELIZ and myself every 2 months with date of rituximab.  -Continue IVIG as is if meeting parameter every 2 months (please update schedule).  -PET/CT soon to be performed. She may need bone marrow biopsy.   -I discussed with patient my planned departure from [facility] and she will establish with a new hematologist/oncologist after February 2024.         Leandra Ryder, DO  Hematology/Oncology  [facility]

## 2024-01-05 NOTE — LETTER
1/5/2024         RE: Tricia Sosa  [id number]  [facility] [id number]        Dear Colleague,    Thank you for referring your patient, Tricia Sosa, to the [facility]. Please see a copy of my visit note below.    [facility]    Hematology/Oncology Established Patient Follow-up Note    Treatment Summary:      Today's Date: 10/17/23    Reason for Follow-up: Hemolytic anemia-autoimmune; IgA deficiency     HISTORY OF PRESENT ILLNESS: Tricia Sosa is an 82 year old female who presents with autoimmune hemolytic anemia and IgA deficiency.  She previously saw Dr. Matos and then Dr. Summers.  She was previously seen at [facility].     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  She was very sick at the time and had severe diarrhea.  She lost about 40 pounds in weight.  The workup revealed that she had markedly diminished CD4 counts of 48 and was diagnosed with CMV colitis.  Since then she has been followed in hematology clinic at [facility] until recently.  She was noted to have anemia which was fairly long-standing.  She was initially referred for anemia in 2004 and also had a bone marrow biopsy done at that time which revealed hypercellular bone marrow with 70-80% cellularity and normal trilineage hematopoiesis and no morphologic features of MDS or lymphoproliferation.  Her anemia was attributed to her chronic underlying disease.  At the next evaluation in 2002 on 4 aggressive anemia there was no evidence of hemolysis, iron deficiency, B12 or folate deficiency.  Bone marrow biopsy was not pursued.  In summer of 2015 she had progressive fatigue, dyspnea on exertion and worsening anemia with a hemoglobin now of 9.  Workup at this time did suggest a hemolytic anemia with elevated LDH at 709, undetectable haptoglobin and elevated unconjugated bilirubin at 3.3.  Monospecific MARIKA was positive for both IgG and complement.   Cold agglutinin screen was positive with very low titer 1:64.  She was started on prednisone 60 mg daily with supplementation of iron folate and B12 starting 7/31/15.  Her prednisone has been slowly tapered and was discontinued off in January 2016.  She was last followed at [facility] on March 10, 2016 when she had stable hemoglobin.     She was followed by Dr. Matos and Dr. Summers.  Due to relapse of hemolytic anemia, she underwent another course of prednisone that has since been tapered off and rituximab x 4 weekly doses completed in 9/19/19.     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  She started weekly Rituxan on 7/31/20 x 4 doses, completed on 8/21/20.  She tapered off of prednisone in October 2020.     Due to relapse of her AIHA, she started prednisone again on 6/8/21 at 60 mg daily with slow taper and finished taper in August 2021.     Due to relapse of AIHA again, she started prednisone again on 11/2/21 at 70 mg daily with slow taper.  She also completed weekly Rituxan again x 4 doses 12/6/21-12/29/21.    Venofer 5/5-5/19/22.      INTERIM HISTORY:  Patient continues to do well.  She continues rituximab on maintenance every 2 months. She continues on IVIG as well.    No weight loss, fatigue, LAD, night sweats, fever. No gross evidence of bleed. She states she will not have cyst removal at this time due to too many appointments.     She had MRI recently done showing heterogenous appearance of the pelvis and femur.       REVIEW OF SYSTEMS:   A 14 point ROS was reviewed with pertinent positives and negatives in the HPI.       HOME MEDICATIONS:  Current Outpatient Medications   Medication Sig Dispense Refill     cyanocobalamin (VITAMIN  B-12) 1000 MCG tablet   3     folic acid (FOLVITE) 1 MG tablet   3     ketoconazole (NIZORAL) 2 % external shampoo Use every other day to scalp as needed 120 mL 0     levothyroxine (SYNTHROID/LEVOTHROID) 150 MCG  tablet Take one tablet  by mouth daily 90 tablet 2     triamcinolone (KENALOG) 0.1 % external ointment Apply topically 2 times daily           ALLERGIES:  Allergies   Allergen Reactions     Doxycycline          PAST MEDICAL HISTORY:  Past Medical History:   Diagnosis Date     WARD (dyspnea on exertion)      External hemorrhoids without mention of complication 6/27/05     Hemolytic anemia (H24)     autoimmune     Hypothyroidism      IgA deficiency (H)      Myopia of both eyes with astigmatism and presbyopia 8/16/2017     Other malaise and fatigue      Other severe protein-calorie malnutrition      Other vitreous opacities 12/19/2006     Thyroid disease      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    Hemorrhage Subdural Trauma Without LOC Active     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, and posterior vitreous detachment     Urinary tract infection, site not specified     Recurrent UTI's         PAST SURGICAL HISTORY:  Past Surgical History:   Procedure Laterality Date     COLONOSCOPY N/A 4/26/2016    Procedure: COLONOSCOPY;  Surgeon: Dontae Del Rio MD;  Location:  GI     ENT SURGERY  1985    Thyroid lobectomy     EYE SURGERY Bilateral 04/20/2021    Cataract Surgery     HC CYSTOSCOPY,INSERT URETERAL STENT      Ureteral stent insertion     HC FLEX SIGMOIDOSCOPY W BIOPSY  5/30/00     HC LAPAROSCOPY, SURGICAL; CHOLECYSTECTOMY  1992      THYROIDECTOMY       LIGATION OF HEMORRHOID(S)      Hemorrhoidectomy     UPPER GI ENDOSCOPY,BIOPSY  10/01/01     ZZC LIGATE FALLOPIAN TUBE       ZZHC COLONOSCOPY W BIOPSY  4/26/00     ZZHC COLONOSCOPY W BIOPSY  10/8/03     ZZHC COLONOSCOPY W BIOPSY  6/27/05    REPEAT IN 5 YEARS         SOCIAL HISTORY:  Social History     Socioeconomic History     Marital status:      Spouse name: Not on file     Number of children: Not on file     Years of education: Not on file     Highest education level: Not on file   Occupational History      Not on file   Tobacco Use     Smoking status: Never     Smokeless tobacco: Never   Vaping Use     Vaping Use: Never used   Substance and Sexual Activity     Alcohol use: Yes     Alcohol/week: 0.0 standard drinks of alcohol     Comment: 1 a day at most     Drug use: No     Sexual activity: Not Currently     Partners: Male   Other Topics Concern     Parent/sibling w/ CABG, MI or angioplasty before 65F 55M? No   Social History Narrative     Not on file     Social Determinants of Health     Financial Resource Strain: Low Risk  (11/30/2023)    Financial Resource Strain      Within the past 12 months, have you or your family members you live with been unable to get utilities (heat, electricity) when it was really needed?: No   Food Insecurity: Low Risk  (11/30/2023)    Food Insecurity      Within the past 12 months, did you worry that your food would run out before you got money to buy more?: No      Within the past 12 months, did the food you bought just not last and you didn t have money to get more?: No   Transportation Needs: Low Risk  (11/30/2023)    Transportation Needs      Within the past 12 months, has lack of transportation kept you from medical appointments, getting your medicines, non-medical meetings or appointments, work, or from getting things that you need?: No   Physical Activity: Not on file   Stress: Not on file   Social Connections: Not on file   Interpersonal Safety: Low Risk  (12/5/2023)    Interpersonal Safety      Do you feel physically and emotionally safe where you currently live?: Yes      Within the past 12 months, have you been hit, slapped, kicked or otherwise physically hurt by someone?: No      Within the past 12 months, have you been humiliated or emotionally abused in other ways by your partner or ex-partner?: No   Housing Stability: Low Risk  (11/30/2023)    Housing Stability      Do you have housing? : Yes      Are you worried about losing your housing?: No         FAMILY  HISTORY:  Family History   Problem Relation Age of Onset     Colon Cancer Mother 90     Cerebrovascular Disease Father          at age 85     Dementia Brother      Prostate Cancer Brother      Thyroid Disease Brother      Dementia Brother      Thyroid Disease Brother      Pancreatic Cancer Brother      Breast Cancer Sister      Hyperlipidemia Sister      Depression Sister      Anxiety Disorder Sister      Thyroid Disease Sister      Breast Cancer Sister      Colon Cancer Niece      Other Cancer Niece         leukemia         PHYSICAL EXAM:  LMP  (LMP Unknown)   PHYSICAL EXAMINATION:     GENERAL/CONSTITUTIONAL: No acute distress.  RESPIRATORY: Clear to auscultation bilaterally. No crackles or wheezing.   CARDIOVASCULAR: Regular rate and rhythm without murmurs, gallops, or rubs.  MUSCULOSKELETAL: Warm and well-perfused, no cyanosis, clubbing, or edema.  NEUROLOGIC: Cranial nerves II-XII are intact. Alert, oriented, answers questions appropriately.  INTEGUMENTARY: No rashes or jaundice.  GAIT: Steady, does not use assistive device.      LABS: Reviewed with patient today.   Latest Reference Range & Units 24 14:49   WBC 4.0 - 11.0 10e3/uL 4.5   Hemoglobin 11.7 - 15.7 g/dL 12.4   Hematocrit 35.0 - 47.0 % 36.3   Platelet Count 150 - 450 10e3/uL 130 (L)   RBC Count 3.80 - 5.20 10e6/uL 3.54 (L)   MCV 78 - 100 fL 103 (H)   MCH 26.5 - 33.0 pg 35.0 (H)   MCHC 31.5 - 36.5 g/dL 34.2   RDW 10.0 - 15.0 % 13.5   % Neutrophils % 48   % Lymphocytes % 44   % Monocytes % 8   % Eosinophils % 0   % Basophils % 0   Absolute Basophils 0.0 - 0.2 10e3/uL 0.0   Absolute Eosinophils 0.0 - 0.7 10e3/uL 0.0   Absolute Immature Granulocytes <=0.4 10e3/uL 0.0   Absolute Lymphocytes 0.8 - 5.3 10e3/uL 2.0   Absolute Monocytes 0.0 - 1.3 10e3/uL 0.4   % Immature Granulocytes % 0   Absolute Neutrophils 1.6 - 8.3 10e3/uL 2.1   Absolute NRBCs 10e3/uL 0.0   NRBCs per 100 WBC <1 /100 0   % Retic 0.5 - 2.0 % 1.7   Absolute Retic 0.025 - 0.095  10e6/uL 0.060   (L): Data is abnormally low  (H): Data is abnormally high        IMAGING:  BILATERAL FULL FIELD DIGITAL SCREENING MAMMOGRAM WITH TOMOSYNTHESIS     Performed on: 6/23/22     IMPRESSION: ACR BI-RADS Category 1: Negative     RECOMMENDED FOLLOW-UP: Annual routine screening mammogram      MR Femur 12/12/23:  MR Femur Thigh Left w/o & w Contrast  Order: [id number]  Impression     Diffuse intramuscular and perimyofascial edema in the lower extremities, preferentially in the mid to distal thighs and posterior compartment musculature with minimal to no enhancement. 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 Typically a greater degree of atrophy and involvement of the anterior muscular group would be expected with inclusion body myositis, however the distal predominant component is more common with inclusion body myositis than polymyositis.    Heterogeneous appearance of the marrow of the pelvis and proximal to mid femurs may be on the basis of robust marrow hyperplasia, however given the degree of heterogeneity, recommend appropriate clinical evaluation to exclude underlying myelodysplastic or lymphoproliferative disorder. No pelvic adenopathy visualized.       ASSESSMENT/PLAN:  Tricia Sosa is an 82 year old female with:        Warm autoimmune hemolytic anemia (positive MARIKA to IgG and complement)  Positive cold agglutinin screen with low cold agglutinin titers  Common variable immunodeficiency disorder  Splenomegaly           1) Autoimmune hemolytic anemia: She had relapse in July 2020, and started on prednisone, as well as weekly rituximab infusions x 4, completed on 8/21/20.  Since then, she has been off and on prednisone and rituximab, getting response each time, but relapses are getting more frequent.       She does not want to consider splenectomy yet.      She is completing another 4 doses of weekly rituxumab in May 2022, and then will go on maintenance  rituximab to try to maintain her hemoglobin longer.  She wants to try to avoid having to go back on steroids if possible. She previously tapered off of prednisone again as of early March 2022. Off bactrim as well.    -Hemoglobin is stable in the 12's.  -Monitor CBC monthly.  -Continue maintenance rituximab every 2 months.    -She has been discovered to have abnormal marrow signal on MRI. She had bone marrow biopsy in the past with her original diagnosis (see HPI). Sent for PET/CT to assess for any abnormal marrow enhancement, LAD, or mass. Will follow up on results with possibility of bone marrow biopsy +/- LN biopsy.     2) CVID:   -Continue IVIG.  She gets it, if IgG is <600.   -IgG check and IVIG every 4 weeks if meets parameters  -She saw immunology in [place] who recommended IVIG monthly, but our financial team continues to check and says that it is only covered with IgG is <600.      3) Iron-deficiency anemia  -s/p Venofer in May 2022.     4) Thrombocytopenia  -Waxing/waning.  -Monitor.    5) Left neck cyst  -Should be okay for removal so long as platelets are stable.     6) -Labs reviewed. CMP pending. Otherwise, okay for treatment IVIG 1/9/24.  -Alternate follow up between FELIZ and myself every 2 months with date of rituximab.  -Continue IVIG as is if meeting parameter every 2 months (please update schedule).  -PET/CT soon to be performed. She may need bone marrow biopsy.   -I discussed with patient my planned departure from [facility] and she will establish with a new hematologist/oncologist after February 2024.         Leandra Ryder DO  Hematology/Oncology  [facility]      Again, thank you for allowing me to participate in the care of your patient.        Sincerely,        Leandra Ryder, DO

## 2024-01-05 NOTE — NURSING NOTE
"Oncology Rooming Note    January 5, 2024 3:07 PM   Tricia Sosa is a 83 year old female who presents for:    Chief Complaint   Patient presents with    Oncology Clinic Visit     Initial Vitals: /66   Pulse 74   Temp 98.6  F (37  C) (Oral)   Resp 16   Ht 1.829 m (6' 0.01\")   Wt 67.2 kg (148 lb 3.2 oz)   LMP  (LMP Unknown)   SpO2 95%   BMI 20.10 kg/m   Estimated body mass index is 20.1 kg/m  as calculated from the following:    Height as of this encounter: 1.829 m (6' 0.01\").    Weight as of this encounter: 67.2 kg (148 lb 3.2 oz). Body surface area is 1.85 meters squared.  No Pain (0) Comment: Data Unavailable   No LMP recorded (lmp unknown). Patient is postmenopausal.  Allergies reviewed: Yes  Medications reviewed: Yes    Medications: MEDICATION REFILLS NEEDED TODAY. Provider was notified.  Pharmacy name entered into [facility]:    [place], [place] - [address] [id number] [place], [place] - [address]     Frailty Screening:   Is the patient here for a new oncology consult visit in cancer care? 2. No      Clinical concerns: follow up       Hawa Victor MA              "

## 2024-01-05 NOTE — PROGRESS NOTES
Medical Assistant Note:  Tricia Sosa presents today for blood draw.    Patient seen by provider today: Yes: Dr. Ryder.   present during visit today: Not Applicable.    Concerns: No Concerns.    Procedure:  Lab draw site: left antecub, Needle type: butterfly, Gauge: 23.    Post Assessment:  Labs drawn without difficulty: Yes.    Discharge Plan:  Departure Mode: Ambulatory.    Face to Face Time: 10 minutes.    Hawa Victor MA

## 2024-01-08 ENCOUNTER — DOCUMENTATION ONLY (OUTPATIENT)
Dept: PHARMACY | Facility: CLINIC | Age: 84
End: 2024-01-08
Payer: MEDICARE

## 2024-01-08 LAB
IGA SERPL-MCNC: <2 MG/DL (ref 84–499)
IGG SERPL-MCNC: 478 MG/DL (ref 610–1616)
IGM SERPL-MCNC: <10 MG/DL (ref 35–242)

## 2024-01-08 NOTE — PROGRESS NOTES
Pharmacist IVIG Stewardship Program    Diagnosis: Common Variable Immunodeficiency  Patient was originally diagnosed with Baires back in the 2000's and later transferred her care to [facility]   Date 5/27/2015   IgA Level  Undetectable   IgG Total 658   IgG2 106   IgG4 0.5   IgM Level 48     Current Dosing Regimen: Privigen 35g IV monthly.    Titrated down from every 3 week infusions to every 4 week infusions in 2017  Titrated down further from every 4 weeks to every 6 weeks infusions in March 2018, but patient declined drastically and had to be escalated back to monthly infusions in September 2018  Pre-medications:  Methylprednisolone 20 mg IV push prior to infusion  Current Titration Regimen: 0.5ml/kg/hr and increased by 0.5ml/kg/hr every 15 minutes, max rate 4ml/kg/hr.  Previously Tried Products: Gammagard SD, currently on Privigen    Side Effects: Patient denies side effects such as headaches, flushing, fever, muscle or joint pain, nausea, or rash/itching.    Interventions: Lab review completed.       Assessment:   Date  1/5/2024   IgA <2   IgM <10   IgG 478   Patient's level is below standard range and she is appropriate for additional IVIG therapy. She is tolerating infusions well and IVIG infusions are effective in increasing Ig levels to an appropriate level to minimize patients risk of infection. Patient should continue on treatment as she has been per provider orders, without therapy her levels would drop below therapeutic range.    Plan: Patient is okay to proceed with infusions as planned per provider order until her next Immunoglobulin lab draw, tentatively scheduled for 2/2024    Next Review Needed: 2/2024    Joyce Baxter, PharmD, CSP  Medication Access Pharmacist

## 2024-01-09 ENCOUNTER — INFUSION THERAPY VISIT (OUTPATIENT)
Dept: INFUSION THERAPY | Facility: CLINIC | Age: 84
End: 2024-01-09
Attending: INTERNAL MEDICINE
Payer: MEDICARE

## 2024-01-09 VITALS
DIASTOLIC BLOOD PRESSURE: 73 MMHG | SYSTOLIC BLOOD PRESSURE: 118 MMHG | OXYGEN SATURATION: 98 % | TEMPERATURE: 97.6 F | RESPIRATION RATE: 16 BRPM | HEART RATE: 71 BPM | BODY MASS INDEX: 19.93 KG/M2 | WEIGHT: 147 LBS

## 2024-01-09 DIAGNOSIS — D83.9 CVID (COMMON VARIABLE IMMUNODEFICIENCY) (H): Primary | ICD-10-CM

## 2024-01-09 DIAGNOSIS — D59.19 OTHER AUTOIMMUNE HEMOLYTIC ANEMIA (H): ICD-10-CM

## 2024-01-09 DIAGNOSIS — D59.10 AUTOIMMUNE HEMOLYTIC ANEMIA (H): ICD-10-CM

## 2024-01-09 DIAGNOSIS — D80.3 SELECTIVE DEFICIENCY OF IGG SUBCLASSES (H): ICD-10-CM

## 2024-01-09 PROCEDURE — 250N000011 HC RX IP 250 OP 636: Mod: JZ | Performed by: PHYSICIAN ASSISTANT

## 2024-01-09 PROCEDURE — 96366 THER/PROPH/DIAG IV INF ADDON: CPT

## 2024-01-09 PROCEDURE — 96375 TX/PRO/DX INJ NEW DRUG ADDON: CPT

## 2024-01-09 PROCEDURE — 250N000011 HC RX IP 250 OP 636: Performed by: INTERNAL MEDICINE

## 2024-01-09 PROCEDURE — 96365 THER/PROPH/DIAG IV INF INIT: CPT

## 2024-01-09 RX ORDER — HEPARIN SODIUM (PORCINE) LOCK FLUSH IV SOLN 100 UNIT/ML 100 UNIT/ML
5 SOLUTION INTRAVENOUS
Status: CANCELLED | OUTPATIENT
Start: 2024-01-16

## 2024-01-09 RX ORDER — NALOXONE HYDROCHLORIDE 0.4 MG/ML
0.2 INJECTION, SOLUTION INTRAMUSCULAR; INTRAVENOUS; SUBCUTANEOUS
Status: CANCELLED | OUTPATIENT
Start: 2024-01-16

## 2024-01-09 RX ORDER — METHYLPREDNISOLONE SODIUM SUCCINATE 40 MG/ML
20 INJECTION, POWDER, LYOPHILIZED, FOR SOLUTION INTRAMUSCULAR; INTRAVENOUS ONCE
Status: CANCELLED
Start: 2024-03-05 | End: 2024-01-16

## 2024-01-09 RX ORDER — METHYLPREDNISOLONE SODIUM SUCCINATE 125 MG/2ML
125 INJECTION, POWDER, LYOPHILIZED, FOR SOLUTION INTRAMUSCULAR; INTRAVENOUS
Status: CANCELLED
Start: 2024-01-16

## 2024-01-09 RX ORDER — ALBUTEROL SULFATE 0.83 MG/ML
2.5 SOLUTION RESPIRATORY (INHALATION)
Status: CANCELLED | OUTPATIENT
Start: 2024-01-16

## 2024-01-09 RX ORDER — MEPERIDINE HYDROCHLORIDE 25 MG/ML
25 INJECTION INTRAMUSCULAR; INTRAVENOUS; SUBCUTANEOUS EVERY 30 MIN PRN
Status: CANCELLED | OUTPATIENT
Start: 2024-01-16

## 2024-01-09 RX ORDER — ALBUTEROL SULFATE 90 UG/1
1-2 AEROSOL, METERED RESPIRATORY (INHALATION)
Status: CANCELLED
Start: 2024-01-16

## 2024-01-09 RX ORDER — METHYLPREDNISOLONE SODIUM SUCCINATE 40 MG/ML
20 INJECTION, POWDER, LYOPHILIZED, FOR SOLUTION INTRAMUSCULAR; INTRAVENOUS ONCE
Status: COMPLETED | OUTPATIENT
Start: 2024-01-09 | End: 2024-01-09

## 2024-01-09 RX ORDER — DIPHENHYDRAMINE HYDROCHLORIDE 50 MG/ML
50 INJECTION INTRAMUSCULAR; INTRAVENOUS
Status: CANCELLED
Start: 2024-01-16

## 2024-01-09 RX ORDER — EPINEPHRINE 1 MG/ML
0.3 INJECTION, SOLUTION INTRAMUSCULAR; SUBCUTANEOUS EVERY 5 MIN PRN
Status: CANCELLED | OUTPATIENT
Start: 2024-01-16

## 2024-01-09 RX ORDER — HEPARIN SODIUM,PORCINE 10 UNIT/ML
5 VIAL (ML) INTRAVENOUS
Status: CANCELLED | OUTPATIENT
Start: 2024-01-16

## 2024-01-09 RX ADMIN — HUMAN IMMUNOGLOBULIN G 35 G: 20 LIQUID INTRAVENOUS at 12:28

## 2024-01-09 RX ADMIN — METHYLPREDNISOLONE SODIUM SUCCINATE 20 MG: 40 INJECTION, POWDER, FOR SOLUTION INTRAMUSCULAR; INTRAVENOUS at 12:19

## 2024-01-09 NOTE — PROGRESS NOTES
Infusion Nursing Note:  Tricia Sosa presents today for IVIG.    Patient seen by provider today: No   present during visit today: Not Applicable.    Note: IVIG rate: 0.5 ml/kg/hr X 15 minutes, increased by 0.5 ml/kg/hr with a max rate of 4 ml/kg/hr.    Intravenous Access:  Peripheral IV placed.    Treatment Conditions:  IGG level 478 on 1/5.    Post Infusion Assessment:  Patient tolerated infusion without incident.  Blood return noted pre and post infusion.  Site patent and intact, free from redness, edema or discomfort.  No evidence of extravasations.  Access discontinued per protocol.       Discharge Plan:   AVS to patient via MYCHART.  Patient will return 2/7 for next appointment.   Patient discharged in stable condition accompanied by: self.  Departure Mode: Ambulatory.      Pramod Berry RN

## 2024-01-12 ENCOUNTER — OFFICE VISIT (OUTPATIENT)
Dept: INTERNAL MEDICINE | Facility: CLINIC | Age: 84
End: 2024-01-12
Payer: MEDICARE

## 2024-01-12 VITALS
SYSTOLIC BLOOD PRESSURE: 124 MMHG | HEART RATE: 96 BPM | DIASTOLIC BLOOD PRESSURE: 59 MMHG | HEIGHT: 72 IN | BODY MASS INDEX: 20.05 KG/M2 | TEMPERATURE: 97.9 F | OXYGEN SATURATION: 100 % | WEIGHT: 148 LBS | RESPIRATION RATE: 14 BRPM

## 2024-01-12 DIAGNOSIS — L30.9 DERMATITIS: ICD-10-CM

## 2024-01-12 DIAGNOSIS — Z01.818 PRE-OP EXAMINATION: Primary | ICD-10-CM

## 2024-01-12 PROCEDURE — 99214 OFFICE O/P EST MOD 30 MIN: CPT

## 2024-01-12 RX ORDER — KETOCONAZOLE 20 MG/ML
SHAMPOO TOPICAL
Qty: 120 ML | Refills: 0 | Status: SHIPPED | OUTPATIENT
Start: 2024-01-12 | End: 2024-09-30

## 2024-01-12 NOTE — PATIENT INSTRUCTIONS
Colace over the counter to help with constipation after procedure.    Nothing to eat or drink 8 hours prior to surgery

## 2024-01-12 NOTE — PROGRESS NOTES
[facility]  [address]  [address]  [facility] [address]  Phone: [phone]  Primary Provider: Emily Church  Pre-op Performing Provider: UBALDO SIMMONS      PREOPERATIVE EVALUATION:  Today's date: 1/12/2024    Tricia is a 83 year old, presenting for the following: Patient presents to the clinic for a preop exam for a right thigh biopsy.  Pre-Op Exam        1/12/2024     3:05 PM   Additional Questions   Roomed by Amalia LOPEZ CMA       Surgical Information:  Surgery/Procedure: biopsy, right thigh  Surgery Location: [facility]  Surgeon: Mercy  Surgery Date: 1/16/2024  Time of Surgery: 0750  Where patient plans to recover: At home with family  Fax number for surgical facility: Note does not need to be faxed, will be available electronically in Epic.    Assessment & Plan     The proposed surgical procedure is considered INTERMEDIATE risk.    (Z01.818) Pre-op examination  (primary encounter diagnosis)  Comment: Patient presents to the clinic for a preop exam for a muscle biopsy of the right thigh.  Patient states she has been feeling well and does not have any questions or concerns at this time.  At this time I do not see any major concerns to not proceed with the procedure.  Plan: CANCELED: EKG 12-lead complete w/read - Clinics        Canceled EKG ordered due to patient having no significant cardiac history and not being on any cardiac meds.  Upon exam patient has a normal heart rate and rhythm.  At this time I do not see any major concerns to not proceed with the procedure.    (L30.9) Dermatitis  Comment: Patient has a chief complaint of dermatitis and is requesting a refill of her ketoconazole external shampoo.  Will refill the medication at this time.  Have advised the patient to continue to go through primary care provider for refills of this in the future.  Patient agrees with the plan.  Plan: ketoconazole (NIZORAL) 2 % external shampoo        Refill of  the medication sent in.            - No identified additional risk factors other than previously addressed    Antiplatelet or Anticoagulation Medication Instructions:   - Patient is on no antiplatelet or anticoagulation medications.    Additional Medication Instructions:  Patient will hold all prescription medications the morning of the procedure and take them when she returns home.     RECOMMENDATION:  APPROVAL GIVEN to proceed with proposed procedure, without further diagnostic evaluation.      34 minutes spent by me on the date of the encounter doing chart review, patient visit, and documentation       Subjective       HPI related to upcoming procedure: Patient presents to the clinic for a preop exam for a biopsy of the right thigh.  Patient states the goal of the biopsy is due to myositis of the right lower extremity.        1/5/2024     4:44 PM   Preop Questions   1. Have you ever had a heart attack or stroke? No   2. Have you ever had surgery on your heart or blood vessels, such as a stent placement, a coronary artery bypass, or surgery on an artery in your head, neck, heart, or legs? No   3. Do you have chest pain with activity? No   4. Do you have a history of  heart failure? No   5. Do you currently have a cold, bronchitis or symptoms of other infection? No   6. Do you have a cough, shortness of breath, or wheezing? No   7. Do you or anyone in your family have previous history of blood clots? No   8. Do you or does anyone in your family have a serious bleeding problem such as prolonged bleeding following surgeries or cuts? No   9. Have you ever had problems with anemia or been told to take iron pills? Not right now. Last year iron infusions.   10. Have you had any abnormal blood loss such as black, tarry or bloody stools, or abnormal vaginal bleeding? No   11. Have you ever had a blood transfusion? No   12. Are you willing to have a blood transfusion if it is medically needed before, during, or after your  surgery? Yes   13. Have you or any of your relatives ever had problems with anesthesia? No   14. Do you have sleep apnea, excessive snoring or daytime drowsiness? YES    14a. Do you have a CPAP machine? Yes   15. Do you have any artifical heart valves or other implanted medical devices like a pacemaker, defibrillator, or continuous glucose monitor? No   16. Do you have artificial joints? No   17. Are you allergic to latex? No       Health Care Directive:  Patient does not have a Health Care Directive or Living Will: Patient states has Advance Directive and will bring in a copy to clinic.    Preoperative Review of :   reviewed - no record of controlled substances prescribed.      Status of Chronic Conditions:  ANEMIA - Patient has a recent history of moderate-severe anemia, which has not been symptomatic. Work up to date has revealed Stable. Treatment has been infusions last year but stable now.     HYPOTHYROIDISM - Patient has a longstanding history of chronic Hypothyroidism. Patient has been doing well, noting no tremor, insomnia, hair loss or changes in skin texture. Continues to take medications as directed, without adverse reactions or side effects. Last TSH   Lab Results   Component Value Date    TSH 4.15 04/18/2023   .      SLEEP PROBLEM - Patient has a longstanding history of snoring and not using CPAP anymore.. Patient has tried OTC medications with limited success.     Review of Systems  CONSTITUTIONAL: NEGATIVE for fever, chills, change in weight  INTEGUMENTARY/SKIN: NEGATIVE for worrisome rashes, moles or lesions  EYES: NEGATIVE for vision changes or irritation  ENT/MOUTH: NEGATIVE for ear, mouth and throat problems  RESP: NEGATIVE for significant cough or SOB  CV: NEGATIVE for chest pain, palpitations or peripheral edema  GI: NEGATIVE for nausea, abdominal pain, heartburn, or change in bowel habits  : NEGATIVE for frequency, dysuria, or hematuria  MUSCULOSKELETAL: NEGATIVE for significant  arthralgias or myalgia  NEURO: NEGATIVE for weakness, dizziness or paresthesias  ENDOCRINE: NEGATIVE for temperature intolerance, skin/hair changes  HEME: NEGATIVE for bleeding problems  PSYCHIATRIC: NEGATIVE for changes in mood or affect    Patient Active Problem List    Diagnosis Date Noted    Mild protein-calorie malnutrition (H24) 11/06/2023     Priority: Medium    Anemia, iron deficiency 04/27/2022     Priority: Medium    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 Estevez.        Ulcerative colitis (H) 03/29/2014     Priority: Medium     Ulcerative Colitis, Unspecified  Ulcerative colitis, unspecified      Bronchiectasis (H) 04/16/2009     Priority: Medium    Lymphocytic colitis 09/27/2001     Priority: Medium     Problem list name updated by automated process. Provider to review      Acquired hypothyroidism 09/27/2001     Priority: Medium      Past Medical History:   Diagnosis Date    Arthritis     WARD (dyspnea on exertion)     External hemorrhoids without mention of complication 06/27/2005    Hemolytic anemia (H24)     autoimmune    Hypothyroidism     IgA deficiency (H)     Myopia of both eyes with astigmatism and presbyopia 08/16/2017    Other malaise and fatigue     Other severe protein-calorie malnutrition     Other vitreous opacities 12/19/2006    Sleep  apnea     Thyroid disease     Traumatic intracranial subdural hematoma (H) 2014    Hemorrhage Subdural Trauma Without LOC Active    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, and posterior vitreous detachment    Urinary tract infection, site not specified     Recurrent UTI's     Past Surgical History:   Procedure Laterality Date    COLONOSCOPY N/A 2016    Procedure: COLONOSCOPY;  Surgeon: Dontae Del Rio MD;  Location:  GI    ENT SURGERY      Thyroid lobectomy    EYE SURGERY Bilateral 2021    Cataract Surgery    HC CYSTOSCOPY,INSERT URETERAL STENT      Ureteral stent insertion    HC FLEX SIGMOIDOSCOPY W BIOPSY  00     LAPAROSCOPY, SURGICAL; CHOLECYSTECTOMY       THYROIDECTOMY      LIGATION OF HEMORRHOID(S)      Hemorrhoidectomy    UPPER GI ENDOSCOPY,BIOPSY  10/01/01    [facility] LIGATE FALLOPIAN TUBE      [facility] COLONOSCOPY W BIOPSY  00    [facility]ON[facility]COPY W BIOPSY  [facility]    [facility]    REPEAT IN 5 YEARS     Current Outpatient Medications   Medication Sig Dispense Refill    cyanocobalamin (VITAMIN  B-12) 1000 MCG tablet   3    folic acid (FOLVITE) 1 MG tablet   3    ketoconazole (NIZORAL) 2 % external shampoo Use every other day to scalp as needed 120 mL 0    levothyroxine (SYNTHROID/LEVOTHROID) 150 MCG tablet Take one tablet  by mouth daily 90 tablet 2    triamcinolone (KENALOG) 0.1 % external ointment Apply topically 2 times daily         Allergies   Allergen Reactions    Doxycycline         Social History     Tobacco Use    Smoking status: Never    Smokeless tobacco: Never   Substance Use Topics    Alcohol use: Yes     Comment: 1 a day at most     Family History   Problem Relation Age of Onset    Colon Cancer Mother 90    Cerebrovascular Disease Father          at age 85    Dementia Brother     Prostate Cancer Brother     Thyroid Disease Brother     Dementia Brother     Thyroid Disease Brother     Pancreatic Cancer  "Brother     Breast Cancer Sister     Hyperlipidemia Sister     Depression Sister     Anxiety Disorder Sister     Thyroid Disease Sister     Lung Cancer Sister     Breast Cancer Sister     Other Cancer Sister         Lung    Colon Cancer Niece     Other Cancer Niece         leukemia     History   Drug Use No         Objective     /59 (BP Location: Left arm, Patient Position: Sitting, Cuff Size: Adult Regular)   Pulse 96   Temp 97.9  F (36.6  C) (Tympanic)   Resp 14   Ht 1.829 m (6' 0.01\")   Wt 67.1 kg (148 lb)   LMP  (LMP Unknown)   SpO2 100%   Breastfeeding No   BMI 20.07 kg/m      Physical Exam    GENERAL APPEARANCE: healthy, alert and no distress     EYES: EOMI, PERRL     HENT: ear canals and TM's normal and nose and mouth without ulcers or lesions     NECK: no adenopathy, no asymmetry, masses, or scars and thyroid normal to palpation     RESP: lungs clear to auscultation - no rales, rhonchi or wheezes     CV: regular rates and rhythm, normal S1 S2, no S3 or S4 and no murmur, click or rub     ABDOMEN:  soft, nontender, no HSM or masses and bowel sounds normal     MS: extremities normal- no gross deformities noted, no evidence of inflammation in joints, FROM in all extremities.     SKIN: no suspicious lesions or rashes     NEURO: Normal strength and tone, sensory exam grossly normal, mentation intact and speech normal     PSYCH: mentation appears normal. and affect normal/bright     LYMPHATICS: No cervical adenopathy    Recent Labs   Lab Test 01/05/24  1449 12/12/23  1251   HGB 12.4 11.7   * 129*    139   POTASSIUM 4.2 4.2   CR 0.61 0.65        Diagnostics:  No labs were ordered during this visit.   No EKG required for low risk surgery (cataract, skin procedure, breast biopsy, etc).    Revised Cardiac Risk Index (RCRI):  The patient has the following serious cardiovascular risks for perioperative complications:   - No serious cardiac risks = 0 points     RCRI Interpretation: 0 points: " Class I (very low risk - 0.4% complication rate)         Signed Electronically by: SHANIQUE Aldana CNP  Copy of this evaluation report is provided to requesting physician.

## 2024-01-16 ENCOUNTER — APPOINTMENT (OUTPATIENT)
Dept: SURGERY | Facility: PHYSICIAN GROUP | Age: 84
End: 2024-01-16
Payer: MEDICARE

## 2024-01-16 ENCOUNTER — HOSPITAL ENCOUNTER (OUTPATIENT)
Facility: CLINIC | Age: 84
Discharge: HOME | End: 2024-01-16
Attending: STUDENT IN AN ORGANIZED HEALTH CARE EDUCATION/TRAINING PROGRAM | Admitting: STUDENT IN AN ORGANIZED HEALTH CARE EDUCATION/TRAINING PROGRAM
Payer: MEDICARE

## 2024-01-16 ENCOUNTER — ANESTHESIA (OUTPATIENT)
Dept: SURGERY | Facility: CLINIC | Age: 84
End: 2024-01-16
Payer: MEDICARE

## 2024-01-16 ENCOUNTER — ANESTHESIA EVENT (OUTPATIENT)
Dept: SURGERY | Facility: CLINIC | Age: 84
End: 2024-01-16
Payer: MEDICARE

## 2024-01-16 VITALS
DIASTOLIC BLOOD PRESSURE: 58 MMHG | BODY MASS INDEX: 19.59 KG/M2 | RESPIRATION RATE: 12 BRPM | OXYGEN SATURATION: 99 % | SYSTOLIC BLOOD PRESSURE: 119 MMHG | WEIGHT: 144.5 LBS | HEART RATE: 71 BPM | TEMPERATURE: 97 F

## 2024-01-16 DIAGNOSIS — M60.9 MYOSITIS OF RIGHT LOWER EXTREMITY, UNSPECIFIED MYOSITIS TYPE: Primary | ICD-10-CM

## 2024-01-16 PROCEDURE — 999N000141 HC STATISTIC PRE-PROCEDURE NURSING ASSESSMENT: Performed by: STUDENT IN AN ORGANIZED HEALTH CARE EDUCATION/TRAINING PROGRAM

## 2024-01-16 PROCEDURE — 88348 ELECTRON MICROSCOPY DX: CPT | Performed by: STUDENT IN AN ORGANIZED HEALTH CARE EDUCATION/TRAINING PROGRAM

## 2024-01-16 PROCEDURE — 84999 UNLISTED CHEMISTRY PROCEDURE: CPT | Performed by: STUDENT IN AN ORGANIZED HEALTH CARE EDUCATION/TRAINING PROGRAM

## 2024-01-16 PROCEDURE — 272N000001 HC OR GENERAL SUPPLY STERILE: Performed by: STUDENT IN AN ORGANIZED HEALTH CARE EDUCATION/TRAINING PROGRAM

## 2024-01-16 PROCEDURE — 250N000011 HC RX IP 250 OP 636: Performed by: STUDENT IN AN ORGANIZED HEALTH CARE EDUCATION/TRAINING PROGRAM

## 2024-01-16 PROCEDURE — 88346 IMFLUOR 1ST 1ANTB STAIN PX: CPT | Performed by: STUDENT IN AN ORGANIZED HEALTH CARE EDUCATION/TRAINING PROGRAM

## 2024-01-16 PROCEDURE — 88313 SPECIAL STAINS GROUP 2: CPT | Mod: 59 | Performed by: STUDENT IN AN ORGANIZED HEALTH CARE EDUCATION/TRAINING PROGRAM

## 2024-01-16 PROCEDURE — 250N000025 HC SEVOFLURANE, PER MIN: Performed by: STUDENT IN AN ORGANIZED HEALTH CARE EDUCATION/TRAINING PROGRAM

## 2024-01-16 PROCEDURE — 258N000003 HC RX IP 258 OP 636: Performed by: ANESTHESIOLOGY

## 2024-01-16 PROCEDURE — 250N000009 HC RX 250: Performed by: NURSE ANESTHETIST, CERTIFIED REGISTERED

## 2024-01-16 PROCEDURE — 370N000017 HC ANESTHESIA TECHNICAL FEE, PER MIN: Performed by: STUDENT IN AN ORGANIZED HEALTH CARE EDUCATION/TRAINING PROGRAM

## 2024-01-16 PROCEDURE — 250N000011 HC RX IP 250 OP 636: Performed by: NURSE ANESTHETIST, CERTIFIED REGISTERED

## 2024-01-16 PROCEDURE — 250N000013 HC RX MED GY IP 250 OP 250 PS 637: Performed by: STUDENT IN AN ORGANIZED HEALTH CARE EDUCATION/TRAINING PROGRAM

## 2024-01-16 PROCEDURE — 710N000009 HC RECOVERY PHASE 1, LEVEL 1, PER MIN: Performed by: STUDENT IN AN ORGANIZED HEALTH CARE EDUCATION/TRAINING PROGRAM

## 2024-01-16 PROCEDURE — 250N000009 HC RX 250: Performed by: STUDENT IN AN ORGANIZED HEALTH CARE EDUCATION/TRAINING PROGRAM

## 2024-01-16 PROCEDURE — 20205 DEEP MUSCLE BIOPSY: CPT | Performed by: STUDENT IN AN ORGANIZED HEALTH CARE EDUCATION/TRAINING PROGRAM

## 2024-01-16 PROCEDURE — 710N000012 HC RECOVERY PHASE 2, PER MINUTE: Performed by: STUDENT IN AN ORGANIZED HEALTH CARE EDUCATION/TRAINING PROGRAM

## 2024-01-16 PROCEDURE — 360N000075 HC SURGERY LEVEL 2, PER MIN: Performed by: STUDENT IN AN ORGANIZED HEALTH CARE EDUCATION/TRAINING PROGRAM

## 2024-01-16 PROCEDURE — 258N000003 HC RX IP 258 OP 636: Performed by: NURSE ANESTHETIST, CERTIFIED REGISTERED

## 2024-01-16 PROCEDURE — 88305 TISSUE EXAM BY PATHOLOGIST: CPT | Performed by: STUDENT IN AN ORGANIZED HEALTH CARE EDUCATION/TRAINING PROGRAM

## 2024-01-16 RX ORDER — HYDROMORPHONE HCL IN WATER/PF 6 MG/30 ML
0.4 PATIENT CONTROLLED ANALGESIA SYRINGE INTRAVENOUS EVERY 5 MIN PRN
Status: DISCONTINUED | OUTPATIENT
Start: 2024-01-16 | End: 2024-01-16 | Stop reason: HOSPADM

## 2024-01-16 RX ORDER — GLYCOPYRROLATE 0.2 MG/ML
INJECTION, SOLUTION INTRAMUSCULAR; INTRAVENOUS PRN
Status: DISCONTINUED | OUTPATIENT
Start: 2024-01-16 | End: 2024-01-16

## 2024-01-16 RX ORDER — BUPIVACAINE HYDROCHLORIDE AND EPINEPHRINE 2.5; 5 MG/ML; UG/ML
INJECTION, SOLUTION EPIDURAL; INFILTRATION; INTRACAUDAL; PERINEURAL PRN
Status: DISCONTINUED | OUTPATIENT
Start: 2024-01-16 | End: 2024-01-16 | Stop reason: HOSPADM

## 2024-01-16 RX ORDER — OXYCODONE HYDROCHLORIDE 5 MG/1
10 TABLET ORAL
Status: DISCONTINUED | OUTPATIENT
Start: 2024-01-16 | End: 2024-01-16 | Stop reason: HOSPADM

## 2024-01-16 RX ORDER — ONDANSETRON 4 MG/1
4 TABLET, ORALLY DISINTEGRATING ORAL EVERY 30 MIN PRN
Status: DISCONTINUED | OUTPATIENT
Start: 2024-01-16 | End: 2024-01-16 | Stop reason: HOSPADM

## 2024-01-16 RX ORDER — HYDRALAZINE HYDROCHLORIDE 20 MG/ML
2.5-5 INJECTION INTRAMUSCULAR; INTRAVENOUS EVERY 10 MIN PRN
Status: DISCONTINUED | OUTPATIENT
Start: 2024-01-16 | End: 2024-01-16 | Stop reason: HOSPADM

## 2024-01-16 RX ORDER — ACETAMINOPHEN 325 MG/1
650 TABLET ORAL EVERY 4 HOURS PRN
Qty: 50 TABLET | Refills: 0 | Status: SHIPPED | OUTPATIENT
Start: 2024-01-16 | End: 2024-02-15

## 2024-01-16 RX ORDER — LIDOCAINE 40 MG/G
CREAM TOPICAL
Status: DISCONTINUED | OUTPATIENT
Start: 2024-01-16 | End: 2024-01-16 | Stop reason: HOSPADM

## 2024-01-16 RX ORDER — LABETALOL HYDROCHLORIDE 5 MG/ML
10 INJECTION, SOLUTION INTRAVENOUS
Status: DISCONTINUED | OUTPATIENT
Start: 2024-01-16 | End: 2024-01-16 | Stop reason: HOSPADM

## 2024-01-16 RX ORDER — ONDANSETRON 2 MG/ML
4 INJECTION INTRAMUSCULAR; INTRAVENOUS EVERY 30 MIN PRN
Status: DISCONTINUED | OUTPATIENT
Start: 2024-01-16 | End: 2024-01-16 | Stop reason: HOSPADM

## 2024-01-16 RX ORDER — CEFAZOLIN SODIUM/WATER 2 G/20 ML
2 SYRINGE (ML) INTRAVENOUS SEE ADMIN INSTRUCTIONS
Status: DISCONTINUED | OUTPATIENT
Start: 2024-01-16 | End: 2024-01-16 | Stop reason: HOSPADM

## 2024-01-16 RX ORDER — HYDROMORPHONE HCL IN WATER/PF 6 MG/30 ML
0.2 PATIENT CONTROLLED ANALGESIA SYRINGE INTRAVENOUS EVERY 5 MIN PRN
Status: DISCONTINUED | OUTPATIENT
Start: 2024-01-16 | End: 2024-01-16 | Stop reason: HOSPADM

## 2024-01-16 RX ORDER — PROPOFOL 10 MG/ML
INJECTION, EMULSION INTRAVENOUS PRN
Status: DISCONTINUED | OUTPATIENT
Start: 2024-01-16 | End: 2024-01-16

## 2024-01-16 RX ORDER — CEFAZOLIN SODIUM/WATER 2 G/20 ML
2 SYRINGE (ML) INTRAVENOUS
Status: COMPLETED | OUTPATIENT
Start: 2024-01-16 | End: 2024-01-16

## 2024-01-16 RX ORDER — SODIUM CHLORIDE, SODIUM LACTATE, POTASSIUM CHLORIDE, CALCIUM CHLORIDE 600; 310; 30; 20 MG/100ML; MG/100ML; MG/100ML; MG/100ML
INJECTION, SOLUTION INTRAVENOUS CONTINUOUS
Status: DISCONTINUED | OUTPATIENT
Start: 2024-01-16 | End: 2024-01-16 | Stop reason: HOSPADM

## 2024-01-16 RX ORDER — DEXAMETHASONE SODIUM PHOSPHATE 4 MG/ML
INJECTION, SOLUTION INTRA-ARTICULAR; INTRALESIONAL; INTRAMUSCULAR; INTRAVENOUS; SOFT TISSUE PRN
Status: DISCONTINUED | OUTPATIENT
Start: 2024-01-16 | End: 2024-01-16

## 2024-01-16 RX ORDER — FENTANYL CITRATE 50 UG/ML
25 INJECTION, SOLUTION INTRAMUSCULAR; INTRAVENOUS EVERY 5 MIN PRN
Status: DISCONTINUED | OUTPATIENT
Start: 2024-01-16 | End: 2024-01-16 | Stop reason: HOSPADM

## 2024-01-16 RX ORDER — ALBUTEROL SULFATE 0.83 MG/ML
2.5 SOLUTION RESPIRATORY (INHALATION) EVERY 4 HOURS PRN
Status: DISCONTINUED | OUTPATIENT
Start: 2024-01-16 | End: 2024-01-16 | Stop reason: HOSPADM

## 2024-01-16 RX ORDER — ONDANSETRON 2 MG/ML
INJECTION INTRAMUSCULAR; INTRAVENOUS PRN
Status: DISCONTINUED | OUTPATIENT
Start: 2024-01-16 | End: 2024-01-16

## 2024-01-16 RX ORDER — OXYCODONE HYDROCHLORIDE 5 MG/1
5 TABLET ORAL
Status: DISCONTINUED | OUTPATIENT
Start: 2024-01-16 | End: 2024-01-16 | Stop reason: HOSPADM

## 2024-01-16 RX ORDER — LIDOCAINE HYDROCHLORIDE 20 MG/ML
INJECTION, SOLUTION INFILTRATION; PERINEURAL PRN
Status: DISCONTINUED | OUTPATIENT
Start: 2024-01-16 | End: 2024-01-16

## 2024-01-16 RX ORDER — FENTANYL CITRATE 50 UG/ML
INJECTION, SOLUTION INTRAMUSCULAR; INTRAVENOUS PRN
Status: DISCONTINUED | OUTPATIENT
Start: 2024-01-16 | End: 2024-01-16

## 2024-01-16 RX ORDER — FENTANYL CITRATE 50 UG/ML
50 INJECTION, SOLUTION INTRAMUSCULAR; INTRAVENOUS EVERY 5 MIN PRN
Status: DISCONTINUED | OUTPATIENT
Start: 2024-01-16 | End: 2024-01-16 | Stop reason: HOSPADM

## 2024-01-16 RX ORDER — ACETAMINOPHEN 325 MG/1
650 TABLET ORAL
Status: DISCONTINUED | OUTPATIENT
Start: 2024-01-16 | End: 2024-01-16 | Stop reason: HOSPADM

## 2024-01-16 RX ORDER — IBUPROFEN 600 MG/1
600 TABLET, FILM COATED ORAL EVERY 6 HOURS PRN
Qty: 30 TABLET | Refills: 0 | Status: SHIPPED | OUTPATIENT
Start: 2024-01-16 | End: 2024-02-15

## 2024-01-16 RX ADMIN — PROPOFOL 150 MG: 10 INJECTION, EMULSION INTRAVENOUS at 07:50

## 2024-01-16 RX ADMIN — PHENYLEPHRINE HYDROCHLORIDE 100 MCG: 10 INJECTION INTRAVENOUS at 08:29

## 2024-01-16 RX ADMIN — DEXAMETHASONE SODIUM PHOSPHATE 4 MG: 4 INJECTION, SOLUTION INTRA-ARTICULAR; INTRALESIONAL; INTRAMUSCULAR; INTRAVENOUS; SOFT TISSUE at 07:50

## 2024-01-16 RX ADMIN — FENTANYL CITRATE 100 MCG: 50 INJECTION INTRAMUSCULAR; INTRAVENOUS at 07:49

## 2024-01-16 RX ADMIN — SODIUM CHLORIDE, POTASSIUM CHLORIDE, SODIUM LACTATE AND CALCIUM CHLORIDE: 600; 310; 30; 20 INJECTION, SOLUTION INTRAVENOUS at 07:41

## 2024-01-16 RX ADMIN — Medication 2 G: at 07:57

## 2024-01-16 RX ADMIN — GLYCOPYRROLATE 0.2 MG: 0.2 INJECTION, SOLUTION INTRAMUSCULAR; INTRAVENOUS at 08:18

## 2024-01-16 RX ADMIN — ACETAMINOPHEN 650 MG: 325 TABLET, FILM COATED ORAL at 09:56

## 2024-01-16 RX ADMIN — PHENYLEPHRINE HYDROCHLORIDE 100 MCG: 10 INJECTION INTRAVENOUS at 08:15

## 2024-01-16 RX ADMIN — PHENYLEPHRINE HYDROCHLORIDE 100 MCG: 10 INJECTION INTRAVENOUS at 08:00

## 2024-01-16 RX ADMIN — SODIUM CHLORIDE, POTASSIUM CHLORIDE, SODIUM LACTATE AND CALCIUM CHLORIDE: 600; 310; 30; 20 INJECTION, SOLUTION INTRAVENOUS at 08:36

## 2024-01-16 RX ADMIN — LIDOCAINE HYDROCHLORIDE 50 MG: 20 INJECTION, SOLUTION INFILTRATION; PERINEURAL at 07:48

## 2024-01-16 RX ADMIN — ONDANSETRON 4 MG: 2 INJECTION INTRAMUSCULAR; INTRAVENOUS at 08:18

## 2024-01-16 ASSESSMENT — ACTIVITIES OF DAILY LIVING (ADL)
ADLS_ACUITY_SCORE: 38
ADLS_ACUITY_SCORE: 35

## 2024-01-16 NOTE — OP NOTE
General Surgery Operative Note      Pre-operative diagnosis: Myositis, unspecified    Post-operative diagnosis: same    Procedure: Muscle biopsy - right thigh (vastus lateralis)    Surgeon: Dickson Madrid MD   Assistant(s): Seng Low PA-C  The physician assistant was necessary for their expertise in retraction, suturing, and suctioning.    Anesthesia: General    Estimated blood loss: 5 cc     Specimens: ID Type Source Tests Collected by Time Destination   1 : RIGHT ANTEROLATERAL THIGH Tissue Skeletal muscle MUSCLE BIOPSY ([facility]) Dickson Madrid MD 1/16/2024  8:11 AM         INDICATIONS: muscle weakness, MRI with concern for polymyositis or inclusion body myositis - muscle biopsy requested to clarify diagnosis      DESCRIPTION OF PROCEDURE:   The patient was brought into the operating room and positioned supine on the operating room table. General anesthesia was administered. The right anterolateral thigh was prepped and draped in standard sterile fashion. A 4 cm incision was made in the distal anterolateral aspect of the right thigh. The subcutaneous tissue was divided with cautery. The deep fascial layer was then encountered and incised sharply with the Metzenbaum scissors. The muscle belly was visualized and grossly appeared normal. Three muscle specimens were then obtained with cold scissors with at least 1 cm diameter and 1 cm in length. Two of the specimens were placed on a popsicle stick and wrapped in cold sale. The other specimen was placed on a popsicle stick and secured there at the proximal and distal ends with a 2-0 Vicryl suture and then placed in the EM fixative solution. The specimens were then sent to [facility] lab stat. Hemostasis was then assured with cautery. Local anesthesia was then injected into the wound. The wound was then closed in layers with the fascial layer closed with 3-0 Vicryl and the skin closed with 3-0 and 4-0 Vicryl suture. Dermabond was applied. The patient  tolerated the procedure well.  Sponge and needle counts were correct at the end of the case.     Dickson Madrid MD

## 2024-01-16 NOTE — ANESTHESIA PROCEDURE NOTES
Airway       Patient location during procedure: OR       Procedure Start/Stop Times: 1/16/2024 7:53 AM  Staff -        Anesthesiologist:  Jocelyn Palmer MD       CRNA: Paty Bedoya APRN CRNA       Performed By: CRNAIndications and Patient Condition       Indications for airway management: luther-procedural       Induction type:intravenous       Mask difficulty assessment: 1 - vent by mask    Final Airway Details       Final airway type: supraglottic airway    Supraglottic Airway Details        Type: LMA       Brand: I-Gel       LMA size: 4    Post intubation assessment        Placement verified by: capnometry, equal breath sounds and chest rise        Number of attempts at approach: 1       Number of other approaches attempted: 0       Secured with: commercial tube du       Ease of procedure: easy       Dentition: Unchanged    Medication(s) Administered   Medication Administration Time: 1/16/2024 7:53 AM

## 2024-01-16 NOTE — ANESTHESIA PREPROCEDURE EVALUATION
Anesthesia Pre-Procedure Evaluation    Patient: Tricia Sosa   MRN: [id number] : 1940        Procedure : Procedure(s):  BIOPSY, MUSCLE - RIGHT THIGH          Past Medical History:   Diagnosis Date    Arthritis     WARD (dyspnea on exertion)     External hemorrhoids without mention of complication 2005    Hemolytic anemia (H24)     autoimmune    Hypothyroidism     IgA deficiency (H)     Myopia of both eyes with astigmatism and presbyopia 2017    Other malaise and fatigue     Other severe protein-calorie malnutrition     Other vitreous opacities 2006    Sleep apnea     Thyroid disease     Traumatic intracranial subdural hematoma (H) 2014    Hemorrhage Subdural Trauma Without LOC Active    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, and posterior vitreous detachment    Urinary tract infection, site not specified     Recurrent UTI's      Past Surgical History:   Procedure Laterality Date    COLONOSCOPY N/A 2016    Procedure: COLONOSCOPY;  Surgeon: Dontae Del Rio MD;  Location:  GI    ENT SURGERY      Thyroid lobectomy    EYE SURGERY Bilateral 2021    Cataract Surgery    HC CYSTOSCOPY,INSERT URETERAL STENT      Ureteral stent insertion    HC FLEX SIGMOIDOSCOPY W BIOPSY  00     LAPAROSCOPY, SURGICAL; CHOLECYSTECTOMY       THYROIDECTOMY      LIGATION OF HEMORRHOID(S)      Hemorrhoidectomy    UPPER GI ENDOSCOPY,BIOPSY  10/01/01    [facility] LIGATE FALLOPIAN TUBE      [facility] COLONOSCOPY W BIOPSY  00    [facility] COLONOSCOPY W BIOPSY  10/8/03    [facility] COLONOSCOPY W BIOPSY  05    REPEAT IN 5 YEARS      Allergies   Allergen Reactions    Doxycycline       Social History     Tobacco Use    Smoking status: Never    Smokeless tobacco: Never   Substance Use Topics    Alcohol use: Yes     Comment: 1 a day at most      Wt Readings from Last 1 Encounters:   24 65.5 kg (144 lb 8 oz)        Anesthesia Evaluation            ROS/MED  "HX  ENT/Pulmonary:     (+) sleep apnea,                                       Neurologic:       Cardiovascular:       METS/Exercise Tolerance:     Hematologic: Comments: Thrombocytopenia  CVID      Musculoskeletal: Comment: Weakness, possible myositis    (+)  arthritis,             GI/Hepatic:       Renal/Genitourinary:       Endo:     (+)          thyroid problem, hypothyroidism,           Psychiatric/Substance Use:       Infectious Disease:       Malignancy:       Other:            Physical Exam    Airway        Mallampati: I   TM distance: > 3 FB   Neck ROM: full   Mouth opening: > 3 cm    Respiratory Devices and Support         Dental           Cardiovascular   cardiovascular exam normal          Pulmonary   pulmonary exam normal                OUTSIDE LABS:  CBC:   Lab Results   Component Value Date    WBC 4.5 01/05/2024    WBC 4.8 12/12/2023    HGB 12.4 01/05/2024    HGB 11.7 12/12/2023    HCT 36.3 01/05/2024    HCT 33.1 (L) 12/12/2023     (L) 01/05/2024     (L) 12/12/2023     BMP:   Lab Results   Component Value Date     01/05/2024     12/12/2023    POTASSIUM 4.2 01/05/2024    POTASSIUM 4.2 12/12/2023    CHLORIDE 104 01/05/2024    CHLORIDE 104 12/12/2023    CO2 27 01/05/2024    CO2 26 12/12/2023    BUN 16.7 01/05/2024    BUN 15.8 12/12/2023    CR 0.61 01/05/2024    CR 0.65 12/12/2023     (H) 01/05/2024     (H) 12/12/2023     COAGS: No results found for: \"PTT\", \"INR\", \"FIBR\"  POC: No results found for: \"BGM\", \"HCG\", \"HCGS\"  HEPATIC:   Lab Results   Component Value Date    ALBUMIN 4.6 01/05/2024    PROTTOTAL 6.5 01/05/2024    ALT 18 01/05/2024    AST 28 01/05/2024    ALKPHOS 114 01/05/2024    BILITOTAL 0.5 01/05/2024    BILIDIRECT 0.30 05/19/2000     OTHER:   Lab Results   Component Value Date    PH 5.0 03/07/2002    A1C 5.0 09/21/2015    CULLEN 8.5 (L) 01/05/2024    MAG 2.00 06/08/2000    TSH 4.15 04/18/2023    T4 1.09 04/03/2017    CRP <2.9 04/14/2015    SED 40 (H) " 09/16/2022       Anesthesia Plan    ASA Status:  2    NPO Status:  NPO Appropriate    Anesthesia Type: General.     - Airway: LMA   Induction: Intravenous.   Maintenance: Balanced.        Consents    Anesthesia Plan(s) and associated risks, benefits, and realistic alternatives discussed. Questions answered and patient/representative(s) expressed understanding.     - Discussed:     - Discussed with:  Patient            Postoperative Care    Pain management: IV analgesics, Oral pain medications, Multi-modal analgesia.   PONV prophylaxis: Ondansetron (or other 5HT-3), Dexamethasone or Solumedrol     Comments:               Jocelyn Palmer MD    I have reviewed the pertinent notes and labs in the chart from the past 30 days and (re)examined the patient.  Any updates or changes from those notes are reflected in this note.

## 2024-01-16 NOTE — BRIEF OP NOTE
[facility]    Brief Operative Note    Pre-operative diagnosis: Myositis of right lower extremity, unspecified myositis type [M60.9]  Post-operative diagnosis Same as pre-operative diagnosis    Procedure: BIOPSY, MUSCLE - RIGHT THIGH, Right - Leg    Surgeon: Surgeon(s) and Role:     * Dickson Madrid MD - Primary     * Seng Low PA-C - Assisting  Anesthesia: General   Estimated Blood Loss: Minimal    Drains: None  Specimens:   ID Type Source Tests Collected by Time Destination   1 : RIGHT ANTEROLATERAL THIGH Tissue Skeletal muscle MUSCLE BIOPSY ([facility]) Dickson Madrid MD 1/16/2024  8:11 AM      Findings:   S/p right muscle biopsy .  Complications: None.  Implants: * No implants in log *

## 2024-01-16 NOTE — ANESTHESIA POSTPROCEDURE EVALUATION
Patient: Tricia Sosa    Procedure: Procedure(s):  BIOPSY, MUSCLE - RIGHT THIGH       Anesthesia Type:  General    Note:  Disposition: Outpatient   Postop Pain Control: Uneventful            Sign Out: Well controlled pain   PONV: No   Neuro/Psych: Uneventful            Sign Out: Acceptable/Baseline neuro status   Airway/Respiratory: Uneventful            Sign Out: Acceptable/Baseline resp. status   CV/Hemodynamics: Uneventful            Sign Out: Acceptable CV status; No obvious hypovolemia; No obvious fluid overload   Other NRE:    DID A NON-ROUTINE EVENT OCCUR? No           Last vitals:  Vitals Value Taken Time   /60 01/16/24 0917   Temp 97.4  F (36.3  C) 01/16/24 0840   Pulse 67 01/16/24 0925   Resp 38 01/16/24 0925   SpO2 98 % 01/16/24 0924   Vitals shown include unfiled device data.    Electronically Signed By: Jocelyn Palmer MD  January 16, 2024  11:44 AM

## 2024-01-16 NOTE — ANESTHESIA CARE TRANSFER NOTE
Patient: Tricia Sosa    Procedure: Procedure(s):  BIOPSY, MUSCLE - RIGHT THIGH       Diagnosis: Myositis of right lower extremity, unspecified myositis type [M60.9]  Diagnosis Additional Information: No value filed.    Anesthesia Type:   General     Note:    Oropharynx: oropharynx clear of all foreign objects and spontaneously breathing  Level of Consciousness: awake  Oxygen Supplementation: face mask  Level of Supplemental Oxygen (L/min / FiO2): 8  Independent Airway: airway patency satisfactory and stable  Dentition: dentition unchanged  Vital Signs Stable: post-procedure vital signs reviewed and stable  Report to RN Given: handoff report given  Patient transferred to: PACU    Handoff Report: Identifed the Patient, Identified the Reponsible Provider, Reviewed the pertinent medical history, Discussed the surgical course, Reviewed Intra-OP anesthesia mangement and issues during anesthesia, Set expectations for post-procedure period and Allowed opportunity for questions and acknowledgement of understanding      Vitals:  Vitals Value Taken Time   /58 01/16/24 0840   Temp 97.4  F (36.3  C) 01/16/24 0840   Pulse 63 01/16/24 0841   Resp 12 01/16/24 0841   SpO2 100 % 01/16/24 0841   Vitals shown include unfiled device data.    Electronically Signed By: SHANIQUE GASCA CRNA  January 16, 2024  8:42 AM

## 2024-01-16 NOTE — DISCHARGE INSTRUCTIONS
HOME CARE FOLLOWING MINOR SURGERY  YO Early, CT Wilhelm R. O Donnell, J. Shaheen    RESULTS:  If a biopsy of tissue was done, you may call for your final pathology report after 1p.m. two working days after surgery.  Otherwise, this will be reviewed with you at time of phone follow-up (described below).    INCISIONAL CARE:  If you have a dressing in place, keep clean and dry for 48 hours; you may replace the gauze if it becomes soiled.  After 48 hours you may remove the dressing and shower.  Do not submerse incision in water for 1 week.  If you have a Dermabond dressing (a type of skin glue), you may shower immediately.  Sutures which are beneath the skin will absorb and do not need to be removed.  Sutures you can see should be removed at your surgeon's office near 2 weeks postop, unless otherwise instructed.  If present, leave the steri-strips (white paper tapes) in place for 14 days after surgery.  If present, leave Dermabond glue in place until it wears/flakes off.  You may expect a small amount of drainage from your incision.  A lump/ridge under the incision is normal and will gradually resolve.  If it becomes red or very uncomfortable, contact the nurse at your surgeon's office to discuss whether this needs to be evaluated.    ACTIVITY:  Cautiously resume exercise and strenuous activities such as jogging, tennis, aerobics, etc. Also, be careful of stretching activities which affect the area of surgery for two weeks.    DIET:  Start with liquids and gradually resume your regular diet as tolerated.  Increased fluid intake is recommended. While taking pain medications, consider use of a stool softener, increase your fiber in your diet, or add a fiber supplement (like Metamucil, Citrucel) to help prevent constipation - a possible side effect of pain medications.    DISCOMFORT:  Local anesthetic placed at surgery should provide relief for 4-8 hours.  Begin taking pain pills before  discomfort is severe.  Take the pain medication with some food, when possible, to minimize side effects.  Intermittent use of ice packs may help during the first 1-3 weeks after surgery.  Expect gradual improvement.    Over-the-counter anti-inflammatory medications (i.e. Ibuprofen/Advil/Motrin or Naprosyn/Aleve) may be used per package instructions in addition to or while tapering off the narcotic pain medications to decrease swelling and sensitivity.  DO NOT TAKE these Anti-inflammatory medications if your primary physician has advised against doing so, or if you have acid reflux, ulcer, or bleeding disorder, or take blood-thinner medications.  Call your primary physician or the surgery office if you have medication questions.      FOLLOW-UP AFTER SURGERY:  -Our office will contact you approximately 2-3 weeks after surgery to check on your progress and answer any questions you may have.  If you are doing well, you will not need to return for an office appointment.  If any concerns are identified over the phone, we will help you make an appointment to see a provider.    -If you have not received a phone call, have any questions or concerns, or would like to be seen, please call us at [phone].  We are located at: [address], [address]; [place] [address]    -CONTACT US IF THE FOLLOWING DEVELOPS:   1. A fever that is above 101     2. Increased redness, warmth, drainage, bleeding, or swelling.   3. Pain that is not relieved by rest/ice and your prescription.   4.  Increasing pain after 48 hours.   5. Drainage that is thick, cloudy, yellow, green or white.   6. Any other questions or concerns.      FREQUENTLY ASKED QUESTIONS:    Q:  How should my incision look?    A:  Normally your incision will appear slightly swollen with light redness directly along the incision itself as it heals.  It may feel like a bump or ridge as the healing/scarring happens, and over time (3-4 months) this bump or ridge feeling  should slowly go away.  In general, clear or pink watery drainage can be normal at first as your incision heals, but should decrease over time.    Q:  How do I know if my incision is infected?  A:  Look at your incision for signs of infection, like redness around the incision spreading to surrounding skin, or drainage of cloudy or foul-smelling drainage.  If you feel warm, check your temperature to see if you are running a fever.    **If any of these things occur, please notify the nurse at our office.  We may need you to come into the office for an incision check.      Q:  How do I take care of my incision?  A:  If you have a dressing in place - Starting the day after surgery, replace the dressing 1-2 times a day until there is no further drainage from the incision.  At that time, a dressing is no longer needed.  Try to minimize tape on the skin if irritation is occurring at the tape sites.  If you have significant irritation from tape on the skin, please call the office to discuss other method of dressing your incision.    Small pieces of tape called  steri-strips  may be present directly overlying your incision; these may be removed 10 days after surgery unless otherwise specified by your surgeon.  If these tapes start to loosen at the ends, you may trim them back until they fall off or are removed.    A:  If you had  Dermabond  tissue glue used as a dressing (this causes your incision to look shiny with a clear covering over it) - This type of dressing wears off with time and does not require more dressings over the top unless it is draining around the glue as it wears off.  Do not apply ointments or lotions over the incisions until the glue has completely worn off.    Q:  There is a piece of tape or a sticky  lead  still on my skin.  Can I remove this?  A:  Sometimes the sticky  leads  used for monitoring during surgery or for evaluation in the emergency department are not all removed while you are in the  hospital.  These sometimes have a tab or metal dot on them.  You can easily remove these on your own, like taking off a band-aid.  If there is a gel substance under the  lead , simply wipe/clean it off with a washcloth or paper towel.      Q:  What can I do to minimize constipation (very hard stools, or lack of stools)?  A:  Stay well hydrated.  Increase your dietary fiber intake or take a fiber supplement -with plenty of water.  Walk around frequently.  You may consider an over-the-counter stool-softener.  Your Pharmacist can assist you with choosing one that is stocked at your pharmacy.  Constipation is also one of the most common side effects of pain medication.  If you are using pain medication, be pro-active and try to PREVENT problems with constipation by taking the steps above BEFORE constipation becomes a problem.    Q:  What do I do if I need more pain medications?  A:  Call the office to receive refills.  Be aware that certain pain meds cannot be called into a pharmacy and actually require a paper prescription.  A change may be made in your pain med as you progress thru your recovery period or if you have side effects to certain meds.    --Pain meds are NOT refilled after 5pm on weekdays, and NOT AT ALL on the weekends, so please look ahead to prevent problems.      Q:  Why am I having a hard time sleeping now that I am at home?  A:  Many medications you receive while you are in the hospital can impact your sleep for a number of days after your surgery/hospitalization.  Decreased level of activity and naps during the day may also make sleeping at night difficult.  Try to minimize day-time naps, and get up frequently during the day to walk around your home during your recovery time.  Sleep aides may be of some help, but are not recommended for long-term use.      Q:  I am having some back discomfort.  What should I do?  A:  This may be related to certain positioning that was required for your surgery,  extended periods of time in bed, or other changes in your overall activity level.  You may try ice, heat, acetaminophen, or ibuprofen to treat this temporarily.  Note that many pain medications have acetaminophen in them and would state this on the prescription bottle.  Be sure not to exceed the maximum of 4000mg per day of acetaminophen.     **If the pain you are having does not resolve, is severe, or is a flare of back pain you have had on other occasions prior to surgery, please contact your primary physician for further recommendations or for an appointment to be examined at their office.    Q:  Why am I having headaches?  A:  Headaches can be caused by many things:  caffeine withdrawal, use of pain meds, dehydration, high blood pressure, lack of sleep, over-activity/exhaustion, flare-up of usual migraine headaches.  If you feel this is related to muscle tension (a band-like feeling around the head, or a pressure at the low-back of the head) you may try ice or heat to this area.  You may need to drink more fluids (try electrolyte drink like Gatorade), rest, or take your usual migraine medications.   **If your headaches do not resolve, worsen, are accompanied by other symptoms, or if your blood pressure is high, please call your primary physician for recommendation and/or examination.    Q:  I am unable to urinate.  What do I do?  A:  A small percentage of people can have difficulty urinating initially after surgery.  This includes being able to urinate only a very small amount at a time and feeling discomfort or pressure in the very low abdomen.  This is called  urinary retention , and is actually an urgent situation.  Proceed to your nearest Emergency department for evaluation (not an Urgent Care Center).  Sometimes the bladder does not work correctly after certain medications you receive during surgery, or related to certain procedures.  You may need to have a catheter placed until your bladder recovers.  When  planning to go to an Emergency department, it may help to call the ER to let them know you are coming in for this problem after a surgery.  This may help you get in quicker to be evaluated.  **If you have symptoms of a urinary tract infection, please contact your primary physician for the proper evaluation and treatment.        If you have other questions, please call the office Monday thru Friday between 8am and 4:30pm to discuss with the nurse or physician assistant.  #[phone]    There is a surgeon ON CALL on weekday evenings and over the weekend in case of urgent need only, and may be contacted at the same number.    If you are having an emergency, call 911 or proceed to your nearest emergency department.    GENERAL ANESTHESIA OR SEDATION ADULT DISCHARGE INSTRUCTIONS   SPECIAL PRECAUTIONS FOR 24 HOURS AFTER SURGERY    IT IS NOT UNUSUAL TO FEEL LIGHT-HEADED OR FAINT, UP TO 24 HOURS AFTER SURGERY OR WHILE TAKING PAIN MEDICATION.  IF YOU HAVE THESE SYMPTOMS; SIT FOR A FEW MINUTES BEFORE STANDING AND HAVE SOMEONE ASSIST YOU WHEN YOU GET UP TO WALK OR USE THE BATHROOM.    YOU SHOULD REST AND RELAX FOR THE NEXT 24 HOURS AND YOU MUST MAKE ARRANGEMENTS TO HAVE SOMEONE STAY WITH YOU FOR AT LEAST 24 HOURS AFTER YOUR DISCHARGE.  AVOID HAZARDOUS AND STRENUOUS ACTIVITIES.  DO NOT MAKE IMPORTANT DECISIONS FOR 24 HOURS.    DO NOT DRIVE ANY VEHICLE OR OPERATE MECHANICAL EQUIPMENT FOR 24 HOURS FOLLOWING THE END OF YOUR SURGERY.  EVEN THOUGH YOU MAY FEEL NORMAL, YOUR REACTIONS MAY BE AFFECTED BY THE MEDICATION YOU HAVE RECEIVED.    DO NOT DRINK ALCOHOLIC BEVERAGES FOR 24 HOURS FOLLOWING YOUR SURGERY.    DRINK CLEAR LIQUIDS (APPLE JUICE, GINGER ALE, 7-UP, BROTH, ETC.).  PROGRESS TO YOUR REGULAR DIET AS YOU FEEL ABLE.    YOU MAY HAVE A DRY MOUTH, A SORE THROAT, MUSCLES ACHES OR TROUBLE SLEEPING.  THESE SHOULD GO AWAY AFTER 24 HOURS.    CALL YOUR DOCTOR FOR ANY OF THE FOLLOWING:  SIGNS OF INFECTION (FEVER, GROWING TENDERNESS  AT THE SURGERY SITE, A LARGE AMOUNT OF DRAINAGE OR BLEEDING, SEVERE PAIN, FOUL-SMELLING DRAINAGE, REDNESS OR SWELLING.    IT HAS BEEN OVER 8 TO 10 HOURS SINCE SURGERY AND YOU ARE STILL NOT ABLE TO URINATE (PASS WATER).

## 2024-01-23 ENCOUNTER — HOSPITAL ENCOUNTER (OUTPATIENT)
Dept: PET IMAGING | Facility: CLINIC | Age: 84
Discharge: HOME | End: 2024-01-23
Attending: PHYSICIAN ASSISTANT
Payer: MEDICARE

## 2024-01-23 DIAGNOSIS — D75.89 BONE MARROW HYPERPLASIA: ICD-10-CM

## 2024-01-23 DIAGNOSIS — D47.Z9 OTHER SPECIFIED NEOPLASMS OF UNCERTAIN BEHAVIOR OF LYMPHOID, HEMATOPOIETIC AND RELATED TISSUE (H): ICD-10-CM

## 2024-01-23 DIAGNOSIS — D59.10 AUTOIMMUNE HEMOLYTIC ANEMIA (H): ICD-10-CM

## 2024-01-23 PROCEDURE — 343N000001 HC RX 343: Performed by: PHYSICIAN ASSISTANT

## 2024-01-23 PROCEDURE — 71260 CT THORAX DX C+: CPT

## 2024-01-23 PROCEDURE — 78816 PET IMAGE W/CT FULL BODY: CPT | Mod: MG,PI

## 2024-01-23 PROCEDURE — 70491 CT SOFT TISSUE NECK W/DYE: CPT

## 2024-01-23 PROCEDURE — A9552 F18 FDG: HCPCS | Performed by: PHYSICIAN ASSISTANT

## 2024-01-23 PROCEDURE — 250N000011 HC RX IP 250 OP 636: Performed by: PHYSICIAN ASSISTANT

## 2024-01-23 RX ORDER — IOPAMIDOL 755 MG/ML
10-135 INJECTION, SOLUTION INTRAVASCULAR ONCE
Status: COMPLETED | OUTPATIENT
Start: 2024-01-23 | End: 2024-01-23

## 2024-01-23 RX ADMIN — FLUDEOXYGLUCOSE F-18 11.73 MILLICURIE: 500 INJECTION, SOLUTION INTRAVENOUS at 11:49

## 2024-01-23 RX ADMIN — IOPAMIDOL 88 ML: 755 INJECTION, SOLUTION INTRAVENOUS at 11:49

## 2024-01-30 ENCOUNTER — PATIENT OUTREACH (OUTPATIENT)
Dept: ONCOLOGY | Facility: CLINIC | Age: 84
End: 2024-01-30
Payer: MEDICARE

## 2024-01-30 DIAGNOSIS — R94.8 ABNORMAL POSITRON EMISSION TOMOGRAPHY (PET) SCAN: Primary | ICD-10-CM

## 2024-01-30 NOTE — PROGRESS NOTES
[facility]: Cancer Care                                                                                          Writer received a call from Tricia stating that she reviewed her imaging results and noticed that the radiologist is recommending EDG, Colonoscopy, and a MRI. Tricia would like confirmation of this from her care team so that she can work on scheduling. She has follow up with FELIZ Colon on 02/07/24, but doesn't want to wait until then to discuss.     Writer will route to FELIZ Colon for recommendations on additional procedures.     Brandee Bowman RN on 1/30/2024 at 3:29 PM

## 2024-01-31 ENCOUNTER — TELEPHONE (OUTPATIENT)
Dept: GASTROENTEROLOGY | Facility: CLINIC | Age: 84
End: 2024-01-31
Payer: MEDICARE

## 2024-01-31 NOTE — TELEPHONE ENCOUNTER
"Endoscopy Scheduling Screen    Have you had a positive Covid test in the last 14 days?  No    Are you active on MyChart?   Yes    What insurance is in the chart?  Other:  [facility] MEDICARE    Ordering/Referring Provider: CARLA LIM   (If ordering provider performs procedure, schedule with ordering provider unless otherwise instructed. )    BMI: Estimated body mass index is 19.59 kg/m  as calculated from the following:    Height as of 1/12/24: 1.829 m (6' 0.01\").    Weight as of 1/16/24: 65.5 kg (144 lb 8 oz).     Sedation Ordered  moderate sedation.   If patient BMI > 50 do not schedule in [facility].    If patient BMI > 45 do not schedule at [facility].    Are you taking methadone or Suboxone?  No    Have you had difficulties, pain, or discomfort during past endoscopy procedures?  No    Are you taking any prescription medications for pain 3 or more times per week?   NO - No RN review required.    Do you have a history of malignant hyperthermia or adverse reaction to anesthesia?  No    (Females) Are you currently pregnant?   No     Have you been diagnosed or told you have pulmonary hypertension?   No    Do you have an LVAD?  No    Have you been told you have moderate to severe sleep apnea?  No    Have you been told you have COPD, asthma, or any other lung disease?  No    Do you have any heart conditions?  No     Have you ever had an organ transplant?   No    Have you ever had or are you awaiting a heart or lung transplant?   No    Have you had a stroke or transient ischemic attack (TIA aka \"mini stroke\" in the last 6 months?   No    Have you been diagnosed with or been told you have cirrhosis of the liver?   No    Are you currently on dialysis?   No    Do you need assistance transferring?   No    BMI: Estimated body mass index is 19.59 kg/m  as calculated from the following:    Height as of 1/12/24: 1.829 m (6' 0.01\").    Weight as of 1/16/24: 65.5 kg (144 lb 8 oz).     Is patients BMI > 40 and scheduling location " [facility]?  No    Do you take an injectable medication for weight loss or diabetes (excluding insulin)?  No    Do you take the medication Naltrexone?  No    Do you take blood thinners?  No       Prep   Are you currently on dialysis or do you have chronic kidney disease?  No    Do you have a diagnosis of diabetes?  No    Do you have a diagnosis of cystic fibrosis (CF)?  No    On a regular basis do you go 3 -5 days between bowel movements?  No    BMI > 40?  No    Preferred Pharmacy:  [id number] - [place], [place] - [address]  [address] [address]  Phone: [phone] Fax: [phone]      Final Scheduling Details   Colonoscopy prep sent?  Standard MiraLAX    Procedure scheduled  Colonoscopy / Upper endoscopy (EGD)    Surgeon:  APRYL     Date of procedure:  2/22/2024     Pre-OP / PAC:   No - Not required for this site.    Location  [facility] - Per order.    Sedation   Moderate Sedation - Per order.      Patient Reminders:   You will receive a call from a Nurse to review instructions and health history.  This assessment must be completed prior to your procedure.  Failure to complete the Nurse assessment may result in the procedure being cancelled.      On the day of your procedure, please designate an adult(s) who can drive you home stay with you for the next 24 hours. The medicines used in the exam will make you sleepy. You will not be able to drive.      You cannot take public transportation, ride share services, or non-medical taxi service without a responsible caregiver.  Medical transport services are allowed with the requirement that a responsible caregiver will receive you at your destination.  We require that drivers and caregivers are confirmed prior to your procedure.

## 2024-01-31 NOTE — PROGRESS NOTES
Isaiah levy PA  You22 hours ago (4:28 PM)     AW  I put in those orders. Thanks     Isaiah Man PA  You22 hours ago (4:26 PM)     AW  OK, I was going to review it all in detail with her then but if she prefers orders now I can do so.     Writer spoke to Tricia about recommendations from CHELSEA Colon. She states that she has already gotten a call from the [facility] for scopes. She is scheduled for an EGD and Colonoscopy on 02/22/24. Writer transferred Maandrew to our imaging scheduling department. She has no further questions at this time.     Brandee Bowman RN on 1/31/2024 at 3:10 PM

## 2024-02-05 ENCOUNTER — TELEPHONE (OUTPATIENT)
Dept: SURGERY | Facility: CLINIC | Age: 84
End: 2024-02-05
Payer: MEDICARE

## 2024-02-05 NOTE — TELEPHONE ENCOUNTER
S/p Muscle biopsy - right thigh (vastus lateralis)    Procedure date: 1/16/24  Surgeon: Dr. Madrid    Patient is calling for pathology results.      Path results are in process in [facility].      Per [facility] pathology department.  This path was sent out to [facility].      I placed a call to [facility] Neuromuscular path lab.  The path results were signed out on 1/29/24 and [facility] Dr[facility] Abbott's [facility]     Patient was phoned by me with the above information.  Instructed patient to call Dr. Abbott's office for results.  Patient tells me that she has a follow up appointment with Dr. Abbott in 2 weeks and she will discuss path results with him at that visit.

## 2024-02-05 NOTE — PROGRESS NOTES
Oncology/Hematology Visit Note  Feb 7, 2024    Reason for Visit: Follow up of CVID, autoimmune hemolytic anemia, iron deficiency      HISTORY OF PRESENT ILLNESS: Tricia Sosa is a 83 year old female who presents with autoimmune hemolytic anemia and IgA deficiency.  She previously saw Dr. Matos and then Dr. Summers.  She was previously seen at [facility].     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  She was very sick at the time and had severe diarrhea.  She lost about 40 pounds in weight.  The workup revealed that she had markedly diminished CD4 counts of 48 and was diagnosed with CMV colitis.  Since then she has been followed in hematology clinic at [place] until recently.  She was noted to have anemia which was fairly long-standing.  She was initially referred for anemia in 2004 and also had a bone marrow biopsy done at that time which revealed hypercellular bone marrow with 70-80% cellularity and normal trilineage hematopoiesis and no morphologic features of MDS or lymphoproliferation.  Her anemia was attributed to her chronic underlying disease.  At the next evaluation in 2002 on 4 aggressive anemia there was no evidence of hemolysis, iron deficiency, B12 or folate deficiency.  Bone marrow biopsy was not pursued.  In summer of 2015 she had progressive fatigue, dyspnea on exertion and worsening anemia with a hemoglobin now of 9.  Workup at this time did suggest a hemolytic anemia with elevated LDH at 709, undetectable haptoglobin and elevated unconjugated bilirubin at 3.3.  Monospecific MARIKA was positive for both IgG and complement.  Cold agglutinin screen was positive with very low titer 1:64.  She was started on prednisone 60 mg daily with supplementation of iron folate and B12 starting 7/31/15.  Her prednisone has been slowly tapered and was discontinued off in January 2016.  She was last followed at [facility] on March 10, 2016 when she had stable hemoglobin.     She  was followed by Dr. Matos and Dr. Summers.  Due to relapse of hemolytic anemia, she underwent another course of prednisone that has since been tapered off and rituximab x 4 weekly doses completed in 9/19/19.     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  She started weekly Rituxan on 7/31/20 x 4 doses, completed on 8/21/20.  She tapered off of prednisone in October 2020.     Due to relapse of her AIHA, she started prednisone again on 6/8/21 at 60 mg daily with slow taper and finished taper in August 2021.     Due to relapse of AIHA again, she started prednisone again on 11/2/21 at 70 mg daily with slow taper.  She also completed weekly Rituxan again x 4 doses 12/6/21-12/29/21.     Venofer 5/5-5/19/22.     She is now on Rituxan every 2 months and IVIG every 8 weeks if IgG <600.    Interval History:  Tricia is here unaccompanied. Overall she is doing well. Continues to have back pain and leg weakness, will be following up with neurology soon. Otherwise she has no new complaints, specifically no fevers, infectious concerns, respiratory issues, GI issues, bleeding.     Current Outpatient Medications   Medication Sig Dispense Refill    acetaminophen (TYLENOL) 325 MG tablet Take 2 tablets (650 mg) by mouth every 4 hours as needed for mild pain 50 tablet 0    cyanocobalamin (VITAMIN  B-12) 1000 MCG tablet   3    folic acid (FOLVITE) 1 MG tablet   3    ibuprofen (ADVIL/MOTRIN) 600 MG tablet Take 1 tablet (600 mg) by mouth every 6 hours as needed for other (mild and/or inflammatory pain) 30 tablet 0    ketoconazole (NIZORAL) 2 % external shampoo Use every other day to scalp as needed 120 mL 0    levothyroxine (SYNTHROID/LEVOTHROID) 150 MCG tablet Take one tablet  by mouth daily 90 tablet 2    triamcinolone (KENALOG) 0.1 % external ointment Apply topically 2 times daily         Past Medical History  Past Medical History:   Diagnosis Date    Arthritis     WARD (dyspnea  on exertion)     External hemorrhoids without mention of complication 06/27/2005    Hemolytic anemia (H24)     autoimmune    Hypothyroidism     IgA deficiency (H)     Myopia of both eyes with astigmatism and presbyopia 08/16/2017    Other malaise and fatigue     Other severe protein-calorie malnutrition     Other vitreous opacities 12/19/2006    Sleep apnea     Thyroid disease     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    Hemorrhage Subdural Trauma Without LOC Active    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, and posterior vitreous detachment    Urinary tract infection, site not specified     Recurrent UTI's     Past Surgical History:   Procedure Laterality Date    BIOPSY MUSCLE DIAGNOSTIC (LOCATION) Right 1/16/2024    Procedure: BIOPSY, MUSCLE - RIGHT THIGH;  Surgeon: Dickson Madrid MD;  Location:  OR    COLONOSCOPY N/A 4/26/2016    Procedure: COLONOSCOPY;  Surgeon: Dontae Del Rio MD;  Location:  GI    ENT SURGERY  1985    Thyroid lobectomy    EYE SURGERY Bilateral 04/20/2021    Cataract Surgery    HC CYSTOSCOPY,INSERT URETERAL STENT      Ureteral stent insertion    HC FLEX SIGMOIDOSCOPY W BIOPSY  5/30/00     LAPAROSCOPY, SURGICAL; CHOLECYSTECTOMY  1992     THYROIDECTOMY      LIGATION OF HEMORRHOID(S)      Hemorrhoidectomy    UPPER GI ENDOSCOPY,BIOPSY  10/01/01    ZZC LIGATE FALLOPIAN TUBE      ZZHC COLONOSCOPY W BIOPSY  4/26/00    ZZHC COLONOSCOPY W BIOPSY  10/8/03    ZZHC COLONOSCOPY W BIOPSY  6/27/05    REPEAT IN 5 YEARS     Allergies   Allergen Reactions    Doxycycline      Social History   Social History     Tobacco Use    Smoking status: Never    Smokeless tobacco: Never   Vaping Use    Vaping Use: Never used   Substance Use Topics    Alcohol use: Yes     Comment: 1 a day at most    Drug use: No      Past medical history and social history were reviewed.    Physical Examination:  /60   Pulse 74   Temp 98.1  F (36.7  C) (Tympanic)    Resp 18   Ht 1.829 m (6')   Wt 66.7 kg (147 lb)   LMP  (LMP Unknown)   SpO2 100%   BMI 19.94 kg/m    Wt Readings from Last 10 Encounters:   01/16/24 65.5 kg (144 lb 8 oz)   01/12/24 67.1 kg (148 lb)   01/09/24 66.7 kg (147 lb)   01/05/24 67.2 kg (148 lb 3.2 oz)   12/12/23 65.3 kg (144 lb)   12/05/23 65.3 kg (144 lb)   11/24/23 66 kg (145 lb 9.6 oz)   11/14/23 63.5 kg (140 lb)   11/06/23 64.3 kg (141 lb 12.8 oz)   10/30/23 65.3 kg (144 lb)     Constitutional: Well-appearing female in no acute distress.  Eyes: EOMI, PERRL. No scleral icterus.  ENT: Deferred   Lymphatic: Neck is supple without cervical or supraclavicular lymphadenopathy.   Cardiovascular: Regular rate and rhythm. No murmurs, gallops, or rubs. No peripheral edema.  Respiratory: Clear to auscultation bilaterally. No wheezes or crackles.   Neurologic: Cranial nerves II through XII are grossly intact.  Skin: No rashes, petechiae, or bruising noted on exposed skin.    Laboratory Data:   Latest Reference Range & Units 02/07/24 13:08   Sodium 135 - 145 mmol/L 139   Potassium 3.4 - 5.3 mmol/L 4.0   Chloride 98 - 107 mmol/L 105   Carbon Dioxide (CO2) 22 - 29 mmol/L 26   Urea Nitrogen 8.0 - 23.0 mg/dL 19.0   Creatinine 0.51 - 0.95 mg/dL 0.60   GFR Estimate >60 mL/min/1.73m2 89   Calcium 8.8 - 10.2 mg/dL 8.5 (L)   Anion Gap 7 - 15 mmol/L 8   Albumin 3.5 - 5.2 g/dL 4.3   Protein Total 6.4 - 8.3 g/dL 6.0 (L)   Alkaline Phosphatase 40 - 150 U/L 102   ALT 0 - 50 U/L 15   AST 0 - 45 U/L 21   Bilirubin Total <=1.2 mg/dL 0.5   Glucose 70 - 99 mg/dL 128 (H)   Lactate Dehydrogenase 0 - 250 U/L 192   WBC 4.0 - 11.0 10e3/uL 3.7 (L)   Hemoglobin 11.7 - 15.7 g/dL 10.9 (L)   Hematocrit 35.0 - 47.0 % 31.9 (L)   Platelet Count 150 - 450 10e3/uL 114 (L)   RBC Count 3.80 - 5.20 10e6/uL 3.15 (L)   MCV 78 - 100 fL 101 (H)   MCH 26.5 - 33.0 pg 34.6 (H)   MCHC 31.5 - 36.5 g/dL 34.2   RDW 10.0 - 15.0 % 14.3   % Neutrophils % 38   % Lymphocytes % 51   % Monocytes % 11   %  Eosinophils % 0   % Basophils % 0   Absolute Basophils 0.0 - 0.2 10e3/uL 0.0   Absolute Eosinophils 0.0 - 0.7 10e3/uL 0.0   Absolute Immature Granulocytes <=0.4 10e3/uL 0.0   Absolute Lymphocytes 0.8 - 5.3 10e3/uL 1.9   Absolute Monocytes 0.0 - 1.3 10e3/uL 0.4   % Immature Granulocytes % 0   Absolute Neutrophils 1.6 - 8.3 10e3/uL 1.4 (L)   Absolute NRBCs 10e3/uL 0.0   NRBCs per 100 WBC <1 /100 0   % Retic 0.5 - 2.0 % 2.0   Absolute Retic 0.025 - 0.095 10e6/uL 0.061   (L): Data is abnormally low  (H): Data is abnormally high    PET 1/23/24  PET/CT FINDINGS: No FDG avid adenopathy above or below the diaphragm. No suspicious focal marrow uptake. The spleen is enlarged and measures up to 15.4 cm in craniocaudal dimension but does not demonstrate FDG avidity above that of liver.     Odontogenic inflammation of a left maxillary tooth. Mild uptake of the distal esophagus and GE junction is favored inflammatory. Focal uptake of the distal sigmoid colon with SUV max of 9.8 which is likely associated with a 0.5 cm polyp (series 4, image   285). Small amount of fluid of the right distal thigh musculature with mild FDG uptake, favored inflammatory from recent muscle biopsy. Bilateral knee synovitis. Bilateral subscapular fibroblastomas, right greater than left, with mild FDG uptake.   Bilateral shoulder synovitis.     CT FINDINGS: Opacification of a left anterior ethmoid air cell. No cervical adenopathy. No adenopathy in the chest, abdomen, or pelvis. Atrophic thyroid. Normal heart size. Mild coronary artery calcifications. Biapical pleural - parenchymal scarring of   the lungs. Areas of small airway plugging and peribronchial micronodularity in the right middle lobe, suggestive of an infectious/inflammatory process. Additional subcentimeter pulmonary nodules are below the resolution of PET but appear similar to   prior. Cholecystectomy. Cystic lesion in the pancreatic body measuring 0.7 cm without FDG avidity. Bilateral  popliteal fossa cysts. Mildly sclerotic appearance of osseous structures which likely relates to the known automatically hepatic disorder.                                                                      IMPRESSION:     1.  No FDG avid adenopathy above or below the diaphragm. The spleen is enlarged but does not demonstrate increased FDG uptake. No suspicious focal marrow uptake.  2.  Hypermetabolic subcentimeter polyp of the distal sigmoid colon which may represent a benign or malignant polyp. This could be further evaluated by colonoscopy. There is mild focal FDG uptake at the GE junction which is favored inflammatory but could   also be further evaluated by endoscopy.  3.  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 Consider further evaluation with pancreatic MRI.    Assessment and Plan:  # Warm Autoimmune Hemolytic Anemia  (positive MARIKA to IgG and complement). Has had multiple relapses, most recently November 2021. She completed prolonged prednisone taper and Rituxan. She is off prednisone now. Has bactrim when she is on prednisone. She is now on Rixuan every 2 months with stable labs  - CBC reviewed. Slightly lower hgb, plt, and WBC compared to prior. No recent infections or bleeding concerns. LDH, retic, and bili all WNL so lower concern for hemolysis    - Continue with Rituxan today  - CBC with FELIZ next month given lower counts today. Discussed calling sooner with any infectious or bleeding concerns   - Continue with Rituxan every 2 months  - Will follow with FELIZ for now and see new hematologist when they start this summer 2024      # CVID  Follows with immunology as well.   - Doing IVIG every 8 weeks when IGG level is <600.   - Continue monthly monitoring   - CD4 count was low, met with ID, no indication for PJP ppx at this time      # EVI  Prior ferritin was low at 6 in April 2022. S/p Vneofer x 3.   -  Iron studies WNL May 2023  - Repeat iron studies today with lower hemoglobin   - Will check B12 and folate next visit as well      # Possible Myositis  Work-up ongoing with neurology  - Has follow-up in a couple weeks    # PET Findings  Recommended PET due to abnormal marrow signal on MRI. Thankfully PET without any suspicious lymph node or marrow update  - Possible colonic polyp and esophageal thickening noted on scan. Will be undergoing EGD + Colonoscopy 2/22/24   - Likely pancreatic cyst noted. Pancreatic MRI scheduled end of Feb 2024  - Will see FELIZ early March to review     35 minutes spent on the date of the encounter doing chart review, review of test results, interpretation of tests, patient visit, and documentation     Isaiah Man PA-C  Department of Hematology and Oncology  [facility]

## 2024-02-07 ENCOUNTER — INFUSION THERAPY VISIT (OUTPATIENT)
Dept: INFUSION THERAPY | Facility: CLINIC | Age: 84
End: 2024-02-07
Attending: INTERNAL MEDICINE
Payer: MEDICARE

## 2024-02-07 ENCOUNTER — LAB (OUTPATIENT)
Dept: ONCOLOGY | Facility: CLINIC | Age: 84
End: 2024-02-07
Attending: INTERNAL MEDICINE
Payer: MEDICARE

## 2024-02-07 VITALS
RESPIRATION RATE: 18 BRPM | HEART RATE: 74 BPM | SYSTOLIC BLOOD PRESSURE: 126 MMHG | OXYGEN SATURATION: 100 % | BODY MASS INDEX: 19.91 KG/M2 | HEIGHT: 72 IN | TEMPERATURE: 98.1 F | WEIGHT: 147 LBS | DIASTOLIC BLOOD PRESSURE: 60 MMHG

## 2024-02-07 DIAGNOSIS — D59.19 OTHER AUTOIMMUNE HEMOLYTIC ANEMIA (H): Primary | ICD-10-CM

## 2024-02-07 DIAGNOSIS — D59.10 AUTOIMMUNE HEMOLYTIC ANEMIA (H): ICD-10-CM

## 2024-02-07 DIAGNOSIS — D50.9 IRON DEFICIENCY ANEMIA, UNSPECIFIED IRON DEFICIENCY ANEMIA TYPE: ICD-10-CM

## 2024-02-07 DIAGNOSIS — D59.10 AUTOIMMUNE HEMOLYTIC ANEMIA (H): Primary | ICD-10-CM

## 2024-02-07 DIAGNOSIS — D83.9 CVID (COMMON VARIABLE IMMUNODEFICIENCY) (H): ICD-10-CM

## 2024-02-07 LAB
ALBUMIN SERPL BCG-MCNC: 4.3 G/DL (ref 3.5–5.2)
ALP SERPL-CCNC: 102 U/L (ref 40–150)
ALT SERPL W P-5'-P-CCNC: 15 U/L (ref 0–50)
ANION GAP SERPL CALCULATED.3IONS-SCNC: 8 MMOL/L (ref 7–15)
AST SERPL W P-5'-P-CCNC: 21 U/L (ref 0–45)
BASOPHILS # BLD AUTO: 0 10E3/UL (ref 0–0.2)
BASOPHILS NFR BLD AUTO: 0 %
BILIRUB SERPL-MCNC: 0.5 MG/DL
BUN SERPL-MCNC: 19 MG/DL (ref 8–23)
CALCIUM SERPL-MCNC: 8.5 MG/DL (ref 8.8–10.2)
CHLORIDE SERPL-SCNC: 105 MMOL/L (ref 98–107)
CREAT SERPL-MCNC: 0.6 MG/DL (ref 0.51–0.95)
DEPRECATED HCO3 PLAS-SCNC: 26 MMOL/L (ref 22–29)
EGFRCR SERPLBLD CKD-EPI 2021: 89 ML/MIN/1.73M2
EOSINOPHIL # BLD AUTO: 0 10E3/UL (ref 0–0.7)
EOSINOPHIL NFR BLD AUTO: 0 %
ERYTHROCYTE [DISTWIDTH] IN BLOOD BY AUTOMATED COUNT: 14.3 % (ref 10–15)
GLUCOSE SERPL-MCNC: 128 MG/DL (ref 70–99)
HCT VFR BLD AUTO: 31.9 % (ref 35–47)
HGB BLD-MCNC: 10.9 G/DL (ref 11.7–15.7)
IMM GRANULOCYTES # BLD: 0 10E3/UL
IMM GRANULOCYTES NFR BLD: 0 %
IRON BINDING CAPACITY (ROCHE): 290 UG/DL (ref 240–430)
IRON SATN MFR SERPL: 36 % (ref 15–46)
IRON SERPL-MCNC: 105 UG/DL (ref 37–145)
LDH SERPL L TO P-CCNC: 192 U/L (ref 0–250)
LYMPHOCYTES # BLD AUTO: 1.9 10E3/UL (ref 0.8–5.3)
LYMPHOCYTES NFR BLD AUTO: 51 %
MCH RBC QN AUTO: 34.6 PG (ref 26.5–33)
MCHC RBC AUTO-ENTMCNC: 34.2 G/DL (ref 31.5–36.5)
MCV RBC AUTO: 101 FL (ref 78–100)
MONOCYTES # BLD AUTO: 0.4 10E3/UL (ref 0–1.3)
MONOCYTES NFR BLD AUTO: 11 %
NEUTROPHILS # BLD AUTO: 1.4 10E3/UL (ref 1.6–8.3)
NEUTROPHILS NFR BLD AUTO: 38 %
NRBC # BLD AUTO: 0 10E3/UL
NRBC BLD AUTO-RTO: 0 /100
PLATELET # BLD AUTO: 114 10E3/UL (ref 150–450)
POTASSIUM SERPL-SCNC: 4 MMOL/L (ref 3.4–5.3)
PROT SERPL-MCNC: 6 G/DL (ref 6.4–8.3)
RBC # BLD AUTO: 3.15 10E6/UL (ref 3.8–5.2)
RETICS # AUTO: 0.06 10E6/UL (ref 0.03–0.1)
RETICS/RBC NFR AUTO: 2 % (ref 0.5–2)
SODIUM SERPL-SCNC: 139 MMOL/L (ref 135–145)
WBC # BLD AUTO: 3.7 10E3/UL (ref 4–11)

## 2024-02-07 PROCEDURE — G0463 HOSPITAL OUTPT CLINIC VISIT: HCPCS | Mod: 25 | Performed by: PHYSICIAN ASSISTANT

## 2024-02-07 PROCEDURE — 82728 ASSAY OF FERRITIN: CPT | Performed by: INTERNAL MEDICINE

## 2024-02-07 PROCEDURE — 82040 ASSAY OF SERUM ALBUMIN: CPT | Performed by: INTERNAL MEDICINE

## 2024-02-07 PROCEDURE — 36415 COLL VENOUS BLD VENIPUNCTURE: CPT

## 2024-02-07 PROCEDURE — 250N000013 HC RX MED GY IP 250 OP 250 PS 637: Performed by: INTERNAL MEDICINE

## 2024-02-07 PROCEDURE — 83615 LACTATE (LD) (LDH) ENZYME: CPT | Performed by: INTERNAL MEDICINE

## 2024-02-07 PROCEDURE — 258N000003 HC RX IP 258 OP 636: Performed by: INTERNAL MEDICINE

## 2024-02-07 PROCEDURE — 82784 ASSAY IGA/IGD/IGG/IGM EACH: CPT | Performed by: INTERNAL MEDICINE

## 2024-02-07 PROCEDURE — 250N000011 HC RX IP 250 OP 636: Mod: JZ | Performed by: INTERNAL MEDICINE

## 2024-02-07 PROCEDURE — 85025 COMPLETE CBC W/AUTO DIFF WBC: CPT | Performed by: INTERNAL MEDICINE

## 2024-02-07 PROCEDURE — 99214 OFFICE O/P EST MOD 30 MIN: CPT | Performed by: PHYSICIAN ASSISTANT

## 2024-02-07 PROCEDURE — 96413 CHEMO IV INFUSION 1 HR: CPT

## 2024-02-07 PROCEDURE — 83550 IRON BINDING TEST: CPT | Performed by: INTERNAL MEDICINE

## 2024-02-07 PROCEDURE — 96415 CHEMO IV INFUSION ADDL HR: CPT

## 2024-02-07 PROCEDURE — 83010 ASSAY OF HAPTOGLOBIN QUANT: CPT | Performed by: INTERNAL MEDICINE

## 2024-02-07 PROCEDURE — 85045 AUTOMATED RETICULOCYTE COUNT: CPT | Performed by: INTERNAL MEDICINE

## 2024-02-07 RX ORDER — ACETAMINOPHEN 325 MG/1
650 TABLET ORAL ONCE
Status: COMPLETED | OUTPATIENT
Start: 2024-02-07 | End: 2024-02-07

## 2024-02-07 RX ORDER — DIPHENHYDRAMINE HCL 25 MG
50 CAPSULE ORAL ONCE
Status: COMPLETED | OUTPATIENT
Start: 2024-02-07 | End: 2024-02-07

## 2024-02-07 RX ADMIN — DIPHENHYDRAMINE HYDROCHLORIDE 25 MG: 25 CAPSULE ORAL at 13:58

## 2024-02-07 RX ADMIN — ACETAMINOPHEN 325 MG: 325 TABLET ORAL at 13:58

## 2024-02-07 RX ADMIN — RITUXIMAB-ABBS 700 MG: 10 INJECTION, SOLUTION INTRAVENOUS at 14:18

## 2024-02-07 RX ADMIN — SODIUM CHLORIDE 250 ML: 9 INJECTION, SOLUTION INTRAVENOUS at 14:14

## 2024-02-07 ASSESSMENT — PAIN SCALES - GENERAL: PAINLEVEL: NO PAIN (0)

## 2024-02-07 NOTE — LETTER
2/7/2024         RE: Tricia Sosa  [id number]  [facility] [id number]        Dear Colleague,    Thank you for referring your patient, Tricia Sosa, to the [facility] [place]. Please see a copy of my visit note below.    Oncology/Hematology Visit Note  Feb 7, 2024    Reason for Visit: Follow up of CVID, autoimmune hemolytic anemia, iron deficiency      HISTORY OF PRESENT ILLNESS: Tricia Sosa is a 83 year old female who presents with autoimmune hemolytic anemia and IgA deficiency.  She previously saw Dr. Matos and then Dr. Summers.  She was previously seen at [facility].     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  She was very sick at the time and had severe diarrhea.  She lost about 40 pounds in weight.  The workup revealed that she had markedly diminished CD4 counts of 48 and was diagnosed with CMV colitis.  Since then she has been followed in hematology clinic at [place] until recently.  She was noted to have anemia which was fairly long-standing.  She was initially referred for anemia in 2004 and also had a bone marrow biopsy done at that time which revealed hypercellular bone marrow with 70-80% cellularity and normal trilineage hematopoiesis and no morphologic features of MDS or lymphoproliferation.  Her anemia was attributed to her chronic underlying disease.  At the next evaluation in 2002 on 4 aggressive anemia there was no evidence of hemolysis, iron deficiency, B12 or folate deficiency.  Bone marrow biopsy was not pursued.  In summer of 2015 she had progressive fatigue, dyspnea on exertion and worsening anemia with a hemoglobin now of 9.  Workup at this time did suggest a hemolytic anemia with elevated LDH at 709, undetectable haptoglobin and elevated unconjugated bilirubin at 3.3.  Monospecific MARIKA was positive for both IgG and complement.  Cold agglutinin screen was positive with very low titer 1:64.  She was started on  prednisone 60 mg daily with supplementation of iron folate and B12 starting 7/31/15.  Her prednisone has been slowly tapered and was discontinued off in January 2016.  She was last followed at [facility] on March 10, 2016 when she had stable hemoglobin.     She was followed by Dr. Matos and Dr. Summers.  Due to relapse of hemolytic anemia, she underwent another course of prednisone that has since been tapered off and rituximab x 4 weekly doses completed in 9/19/19.     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  She started weekly Rituxan on 7/31/20 x 4 doses, completed on 8/21/20.  She tapered off of prednisone in October 2020.     Due to relapse of her AIHA, she started prednisone again on 6/8/21 at 60 mg daily with slow taper and finished taper in August 2021.     Due to relapse of AIHA again, she started prednisone again on 11/2/21 at 70 mg daily with slow taper.  She also completed weekly Rituxan again x 4 doses 12/6/21-12/29/21.     Venofer 5/5-5/19/22.     She is now on Rituxan every 2 months and IVIG every 8 weeks if IgG <600.    Interval History:  Tricia is here unaccompanied. Overall she is doing well. Continues to have back pain and leg weakness, will be following up with neurology soon. Otherwise she has no new complaints, specifically no fevers, infectious concerns, respiratory issues, GI issues, bleeding.     Current Outpatient Medications   Medication Sig Dispense Refill     acetaminophen (TYLENOL) 325 MG tablet Take 2 tablets (650 mg) by mouth every 4 hours as needed for mild pain 50 tablet 0     cyanocobalamin (VITAMIN  B-12) 1000 MCG tablet   3     folic acid (FOLVITE) 1 MG tablet   3     ibuprofen (ADVIL/MOTRIN) 600 MG tablet Take 1 tablet (600 mg) by mouth every 6 hours as needed for other (mild and/or inflammatory pain) 30 tablet 0     ketoconazole (NIZORAL) 2 % external shampoo Use every other day to scalp as needed 120 mL 0      levothyroxine (SYNTHROID/LEVOTHROID) 150 MCG tablet Take one tablet  by mouth daily 90 tablet 2     triamcinolone (KENALOG) 0.1 % external ointment Apply topically 2 times daily         Past Medical History  Past Medical History:   Diagnosis Date     Arthritis      WARD (dyspnea on exertion)      External hemorrhoids without mention of complication 06/27/2005     Hemolytic anemia (H24)     autoimmune     Hypothyroidism      IgA deficiency (H)      Myopia of both eyes with astigmatism and presbyopia 08/16/2017     Other malaise and fatigue      Other severe protein-calorie malnutrition      Other vitreous opacities 12/19/2006     Sleep apnea      Thyroid disease      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    Hemorrhage Subdural Trauma Without LOC Active     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, and posterior vitreous detachment     Urinary tract infection, site not specified     Recurrent UTI's     Past Surgical History:   Procedure Laterality Date     BIOPSY MUSCLE DIAGNOSTIC (LOCATION) Right 1/16/2024    Procedure: BIOPSY, MUSCLE - RIGHT THIGH;  Surgeon: Dickson Madrid MD;  Location:  OR     COLONOSCOPY N/A 4/26/2016    Procedure: COLONOSCOPY;  Surgeon: Dontae Del Rio MD;  Location:  GI     ENT SURGERY  1985    Thyroid lobectomy     EYE SURGERY Bilateral 04/20/2021    Cataract Surgery      CYSTOSCOPY,INSERT URETERAL STENT      Ureteral stent insertion      FLEX SIGMOIDOSCOPY W BIOPSY  5/30/00      LAPAROSCOPY, SURGICAL; CHOLECYSTECTOMY  1992      THYROIDECTOMY       LIGATION OF HEMORRHOID(S)      Hemorrhoidectomy     UPPER GI ENDOSCOPY,BIOPSY  10/01/01     [facility] LIGATE FALLOPIAN TUBE       [facility] COLONOSCOPY W BIOPSY  4/26/00     [facility] COLONOSCOPY W BIOPSY  10/8/03     [facility]  6/[facility]    REPEAT IN 5 YEARS     Allergies   Allergen Reactions     Doxycycline      Social History   Social History     Tobacco Use     Smoking status:  Never     Smokeless tobacco: Never   Vaping Use     Vaping Use: Never used   Substance Use Topics     Alcohol use: Yes     Comment: 1 a day at most     Drug use: No      Past medical history and social history were reviewed.    Physical Examination:  /60   Pulse 74   Temp 98.1  F (36.7  C) (Tympanic)   Resp 18   Ht 1.829 m (6')   Wt 66.7 kg (147 lb)   LMP  (LMP Unknown)   SpO2 100%   BMI 19.94 kg/m    Wt Readings from Last 10 Encounters:   01/16/24 65.5 kg (144 lb 8 oz)   01/12/24 67.1 kg (148 lb)   01/09/24 66.7 kg (147 lb)   01/05/24 67.2 kg (148 lb 3.2 oz)   12/12/23 65.3 kg (144 lb)   12/05/23 65.3 kg (144 lb)   11/24/23 66 kg (145 lb 9.6 oz)   11/14/23 63.5 kg (140 lb)   11/06/23 64.3 kg (141 lb 12.8 oz)   10/30/23 65.3 kg (144 lb)     Constitutional: Well-appearing female in no acute distress.  Eyes: EOMI, PERRL. No scleral icterus.  ENT: Deferred   Lymphatic: Neck is supple without cervical or supraclavicular lymphadenopathy.   Cardiovascular: Regular rate and rhythm. No murmurs, gallops, or rubs. No peripheral edema.  Respiratory: Clear to auscultation bilaterally. No wheezes or crackles.   Neurologic: Cranial nerves II through XII are grossly intact.  Skin: No rashes, petechiae, or bruising noted on exposed skin.    Laboratory Data:   Latest Reference Range & Units 02/07/24 13:08   Sodium 135 - 145 mmol/L 139   Potassium 3.4 - 5.3 mmol/L 4.0   Chloride 98 - 107 mmol/L 105   Carbon Dioxide (CO2) 22 - 29 mmol/L 26   Urea Nitrogen 8.0 - 23.0 mg/dL 19.0   Creatinine 0.51 - 0.95 mg/dL 0.60   GFR Estimate >60 mL/min/1.73m2 89   Calcium 8.8 - 10.2 mg/dL 8.5 (L)   Anion Gap 7 - 15 mmol/L 8   Albumin 3.5 - 5.2 g/dL 4.3   Protein Total 6.4 - 8.3 g/dL 6.0 (L)   Alkaline Phosphatase 40 - 150 U/L 102   ALT 0 - 50 U/L 15   AST 0 - 45 U/L 21   Bilirubin Total <=1.2 mg/dL 0.5   Glucose 70 - 99 mg/dL 128 (H)   Lactate Dehydrogenase 0 - 250 U/L 192   WBC 4.0 - 11.0 10e3/uL 3.7 (L)   Hemoglobin 11.7 - 15.7 g/dL  10.9 (L)   Hematocrit 35.0 - 47.0 % 31.9 (L)   Platelet Count 150 - 450 10e3/uL 114 (L)   RBC Count 3.80 - 5.20 10e6/uL 3.15 (L)   MCV 78 - 100 fL 101 (H)   MCH 26.5 - 33.0 pg 34.6 (H)   MCHC 31.5 - 36.5 g/dL 34.2   RDW 10.0 - 15.0 % 14.3   % Neutrophils % 38   % Lymphocytes % 51   % Monocytes % 11   % Eosinophils % 0   % Basophils % 0   Absolute Basophils 0.0 - 0.2 10e3/uL 0.0   Absolute Eosinophils 0.0 - 0.7 10e3/uL 0.0   Absolute Immature Granulocytes <=0.4 10e3/uL 0.0   Absolute Lymphocytes 0.8 - 5.3 10e3/uL 1.9   Absolute Monocytes 0.0 - 1.3 10e3/uL 0.4   % Immature Granulocytes % 0   Absolute Neutrophils 1.6 - 8.3 10e3/uL 1.4 (L)   Absolute NRBCs 10e3/uL 0.0   NRBCs per 100 WBC <1 /100 0   % Retic 0.5 - 2.0 % 2.0   Absolute Retic 0.025 - 0.095 10e6/uL 0.061   (L): Data is abnormally low  (H): Data is abnormally high    PET 1/23/24  PET/CT FINDINGS: No FDG avid adenopathy above or below the diaphragm. No suspicious focal marrow uptake. The spleen is enlarged and measures up to 15.4 cm in craniocaudal dimension but does not demonstrate FDG avidity above that of liver.     Odontogenic inflammation of a left maxillary tooth. Mild uptake of the distal esophagus and GE junction is favored inflammatory. Focal uptake of the distal sigmoid colon with SUV max of 9.8 which is likely associated with a 0.5 cm polyp (series 4, image   285). Small amount of fluid of the right distal thigh musculature with mild FDG uptake, favored inflammatory from recent muscle biopsy. Bilateral knee synovitis. Bilateral subscapular fibroblastomas, right greater than left, with mild FDG uptake.   Bilateral shoulder synovitis.     CT FINDINGS: Opacification of a left anterior ethmoid air cell. No cervical adenopathy. No adenopathy in the chest, abdomen, or pelvis. Atrophic thyroid. Normal heart size. Mild coronary artery calcifications. Biapical pleural - parenchymal scarring of   the lungs. Areas of small airway plugging and peribronchial  micronodularity in the right middle lobe, suggestive of an infectious/inflammatory process. Additional subcentimeter pulmonary nodules are below the resolution of PET but appear similar to   prior. Cholecystectomy. Cystic lesion in the pancreatic body measuring 0.7 cm without FDG avidity. Bilateral popliteal fossa cysts. Mildly sclerotic appearance of osseous structures which likely relates to the known automatically hepatic disorder.                                                                      IMPRESSION:     1.  No FDG avid adenopathy above or below the diaphragm. The spleen is enlarged but does not demonstrate increased FDG uptake. No suspicious focal marrow uptake.  2.  Hypermetabolic subcentimeter polyp of the distal sigmoid colon which may represent a benign or malignant polyp. This could be further evaluated by colonoscopy. There is mild focal FDG uptake at the GE junction which is favored inflammatory but could   also be further evaluated by endoscopy.  3.  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 Consider further evaluation with pancreatic MRI.    Assessment and Plan:  # Warm Autoimmune Hemolytic Anemia  (positive MARIKA to IgG and complement). Has had multiple relapses, most recently November 2021. She completed prolonged prednisone taper and Rituxan. She is off prednisone now. Has bactrim when she is on prednisone. She is now on Rixuan every 2 months with stable labs  - CBC reviewed. Slightly lower hgb, plt, and WBC compared to prior. No recent infections or bleeding concerns. LDH, retic, and bili all WNL so lower concern for hemolysis    - Continue with Rituxan today  - CBC with FELIZ next month given lower counts today. Discussed calling sooner with any infectious or bleeding concerns   - Continue with Rituxan every 2 months  - Will follow with FELIZ for now and see new hematologist when they start  this summer 2024      # CVID  Follows with immunology as well.   - Doing IVIG every 8 weeks when IGG level is <600.   - Continue monthly monitoring   - CD4 count was low, met with ID, no indication for PJP ppx at this time      # EVI  Prior ferritin was low at 6 in April 2022. S/p Vneofer x 3.   - Iron studies WNL May 2023  - Repeat iron studies today with lower hemoglobin   - Will check B12 and folate next visit as well      # Possible Myositis  Work-up ongoing with neurology  - Has follow-up in a couple weeks    # PET Findings  Recommended PET due to abnormal marrow signal on MRI. Thankfully PET without any suspicious lymph node or marrow update  - Possible colonic polyp and esophageal thickening noted on scan. Will be undergoing EGD + Colonoscopy 2/22/24   - Likely pancreatic cyst noted. Pancreatic MRI scheduled end of Feb 2024  - Will see FELIZ early March to review     35 minutes spent on the date of the encounter doing chart review, review of test results, interpretation of tests, patient visit, and documentation     Isaiah Man PA-C  Department of Hematology and Oncology  [facility]       Again, thank you for allowing me to participate in the care of your patient.        Sincerely,        CHELSEA Marlow

## 2024-02-07 NOTE — NURSING NOTE
Oncology Rooming Note    February 7, 2024 1:14 PM   Tricia Sosa is a 83 year old female who presents for:    Chief Complaint   Patient presents with    Oncology Clinic Visit     Anemia, iron deficiency     Initial Vitals: /60   Pulse 74   Temp 98.1  F (36.7  C) (Tympanic)   Resp 18   Ht 1.829 m (6')   Wt 66.7 kg (147 lb)   LMP  (LMP Unknown)   SpO2 100%   BMI 19.94 kg/m   Estimated body mass index is 19.94 kg/m  as calculated from the following:    Height as of this encounter: 1.829 m (6').    Weight as of this encounter: 66.7 kg (147 lb). Body surface area is 1.84 meters squared.  No Pain (0) Comment: Data Unavailable   No LMP recorded (lmp unknown). Patient is postmenopausal.  Allergies reviewed: Yes  Medications reviewed: Yes    Medications: Medication refills not needed today.  Pharmacy name entered into Local[facility]:    [place], [place] - [address] [id number] [place], [place] - [address] [facility]    Frailty Screening:   Is the patient here for a new oncology consult visit in cancer care? 2. No      Clinical concerns: f/u       Corrie Hurt, MOHSEN

## 2024-02-07 NOTE — PROGRESS NOTES
Infusion Nursing Note:  Tricia Sosa presents today for C11D1 Rapid Truxima.    Patient seen by provider today: Yes: Isaiah Man.   present during visit today: Not Applicable.    Note: Patient requested 25mg benadryl instead of 50mg and 325mg tylenol instead of 650mg.  She has tolerated Truxima in the past with those doses.      Intravenous Access:  Labs drawn without difficulty.  Peripheral IV placed.    Treatment Conditions:  Lab Results   Component Value Date    HGB 10.9 (L) 02/07/2024    WBC 3.7 (L) 02/07/2024    ANEU 6.8 07/06/2021    ANEUTAUTO 1.4 (L) 02/07/2024     (L) 02/07/2024        Lab Results   Component Value Date     02/07/2024    POTASSIUM 4.0 02/07/2024    MAG 2.00 06/08/2000    CR 0.60 02/07/2024    CULLEN 8.5 (L) 02/07/2024    BILITOTAL 0.5 02/07/2024    ALBUMIN 4.3 02/07/2024    ALT 15 02/07/2024    AST 21 02/07/2024       Results reviewed, labs MET treatment parameters, ok to proceed with treatment.      Post Infusion Assessment:  Patient tolerated infusion without incident.  Blood return noted pre and post infusion.  Site patent and intact, free from redness, edema or discomfort.  No evidence of extravasations.  Access discontinued per protocol.       Discharge Plan:   AVS to patient via MYCHART.     Patient discharged in stable condition accompanied by: self.  Departure Mode: Ambulatory.      Ally Adams RN

## 2024-02-08 ENCOUNTER — DOCUMENTATION ONLY (OUTPATIENT)
Dept: PHARMACY | Facility: CLINIC | Age: 84
End: 2024-02-08
Payer: MEDICARE

## 2024-02-08 LAB
FERRITIN SERPL-MCNC: 38 NG/ML (ref 11–328)
HAPTOGLOB SERPL-MCNC: <10 MG/DL (ref 30–200)
IGA SERPL-MCNC: <2 MG/DL (ref 84–499)
IGG SERPL-MCNC: 561 MG/DL (ref 610–1616)
IGM SERPL-MCNC: <10 MG/DL (ref 35–242)

## 2024-02-08 NOTE — PROGRESS NOTES
Pharmacist IVIG Stewardship Program    Diagnosis: Common Variable Immunodeficiency  Patient was originally diagnosed with [facility] in the 2000's and later transferred her care to [facility]   Date 5/27/2015   IgA Level  Undetectable   IgG Total 658   IgG2 106   IgG4 0.5   IgM Level 48     Current Dosing Regimen: Privigen 35g IV every 8 weeks if IgG <600mg/dL.    Titrated down from every 3 week infusions to every 4 week infusions in 2017  Titrated down further from every 4 weeks to every 6 weeks infusions in March 2018, but patient declined drastically and had to be escalated back to monthly infusions in September 2018  On IVIG every 8 weeks if IgG <600 per provider appointment in February 2024  Pre-medications:  Methylprednisolone 20 mg IV push prior to infusion  Current Titration Regimen: 0.5ml/kg/hr and increased by 0.5ml/kg/hr every 15 minutes, max rate 4ml/kg/hr.  Previously Tried Products: Gammagard SD, currently on Privigen    Side Effects: Patient denies side effects such as headaches, flushing, fever, muscle or joint pain, nausea, or rash/itching.    Interventions: Lab review completed.       Assessment:   Date  2/7/2024   IgA <2   IgM <10   IgG 561   Patient's level is below standard range and she is appropriate for additional IVIG therapy. She is tolerating infusions well and IVIG infusions are effective in increasing Ig levels to an appropriate level to minimize patients risk of infection. Patient should continue on treatment as she has been per provider orders, without therapy her levels would drop below therapeutic range.    Plan: Patient is okay to proceed with IVIG infusions at this time, once additional labs are drawn patient will need to be reassessed for necessity of additional IVIG infusions    Next Review Needed: 3/2024    Joyce Baxter, PharmD, CSP  Medication Access Pharmacist

## 2024-02-13 LAB — SCANNED LAB RESULT: ABNORMAL

## 2024-02-22 ENCOUNTER — HOSPITAL ENCOUNTER (OUTPATIENT)
Facility: CLINIC | Age: 84
Discharge: HOME | End: 2024-02-22
Attending: INTERNAL MEDICINE | Admitting: INTERNAL MEDICINE
Payer: MEDICARE

## 2024-02-22 VITALS
DIASTOLIC BLOOD PRESSURE: 71 MMHG | SYSTOLIC BLOOD PRESSURE: 118 MMHG | OXYGEN SATURATION: 99 % | RESPIRATION RATE: 16 BRPM | HEART RATE: 80 BPM

## 2024-02-22 LAB
COLONOSCOPY: NORMAL
UPPER GI ENDOSCOPY: NORMAL

## 2024-02-22 PROCEDURE — 88342 IMHCHEM/IMCYTCHM 1ST ANTB: CPT | Mod: TC | Performed by: INTERNAL MEDICINE

## 2024-02-22 PROCEDURE — 258N000003 HC RX IP 258 OP 636: Performed by: INTERNAL MEDICINE

## 2024-02-22 PROCEDURE — 250N000011 HC RX IP 250 OP 636: Performed by: INTERNAL MEDICINE

## 2024-02-22 PROCEDURE — 43239 EGD BIOPSY SINGLE/MULTIPLE: CPT | Performed by: INTERNAL MEDICINE

## 2024-02-22 PROCEDURE — 45382 COLONOSCOPY W/CONTROL BLEED: CPT | Performed by: INTERNAL MEDICINE

## 2024-02-22 PROCEDURE — G0500 MOD SEDAT ENDO SERVICE >5YRS: HCPCS | Performed by: INTERNAL MEDICINE

## 2024-02-22 PROCEDURE — 250N000009 HC RX 250: Performed by: INTERNAL MEDICINE

## 2024-02-22 PROCEDURE — 99153 MOD SED SAME PHYS/QHP EA: CPT | Performed by: INTERNAL MEDICINE

## 2024-02-22 PROCEDURE — 45385 COLONOSCOPY W/LESION REMOVAL: CPT | Performed by: INTERNAL MEDICINE

## 2024-02-22 RX ORDER — ONDANSETRON 2 MG/ML
4 INJECTION INTRAMUSCULAR; INTRAVENOUS
Status: DISCONTINUED | OUTPATIENT
Start: 2024-02-22 | End: 2024-02-22 | Stop reason: HOSPADM

## 2024-02-22 RX ORDER — FENTANYL CITRATE 50 UG/ML
INJECTION, SOLUTION INTRAMUSCULAR; INTRAVENOUS PRN
Status: DISCONTINUED | OUTPATIENT
Start: 2024-02-22 | End: 2024-02-22 | Stop reason: HOSPADM

## 2024-02-22 RX ORDER — FLUMAZENIL 0.1 MG/ML
0.2 INJECTION, SOLUTION INTRAVENOUS
Status: DISCONTINUED | OUTPATIENT
Start: 2024-02-22 | End: 2024-02-22 | Stop reason: HOSPADM

## 2024-02-22 RX ORDER — ONDANSETRON 2 MG/ML
4 INJECTION INTRAMUSCULAR; INTRAVENOUS EVERY 6 HOURS PRN
Status: DISCONTINUED | OUTPATIENT
Start: 2024-02-22 | End: 2024-02-22 | Stop reason: HOSPADM

## 2024-02-22 RX ORDER — SIMETHICONE 40MG/0.6ML
133 SUSPENSION, DROPS(FINAL DOSAGE FORM)(ML) ORAL
Status: DISCONTINUED | OUTPATIENT
Start: 2024-02-22 | End: 2024-02-22 | Stop reason: HOSPADM

## 2024-02-22 RX ORDER — NALOXONE HYDROCHLORIDE 0.4 MG/ML
0.2 INJECTION, SOLUTION INTRAMUSCULAR; INTRAVENOUS; SUBCUTANEOUS
Status: DISCONTINUED | OUTPATIENT
Start: 2024-02-22 | End: 2024-02-22 | Stop reason: HOSPADM

## 2024-02-22 RX ORDER — ATROPINE SULFATE 0.1 MG/ML
1 INJECTION INTRAVENOUS
Status: DISCONTINUED | OUTPATIENT
Start: 2024-02-22 | End: 2024-02-22 | Stop reason: HOSPADM

## 2024-02-22 RX ORDER — NALOXONE HYDROCHLORIDE 0.4 MG/ML
0.4 INJECTION, SOLUTION INTRAMUSCULAR; INTRAVENOUS; SUBCUTANEOUS
Status: DISCONTINUED | OUTPATIENT
Start: 2024-02-22 | End: 2024-02-22 | Stop reason: HOSPADM

## 2024-02-22 RX ORDER — FENTANYL CITRATE 50 UG/ML
50-100 INJECTION, SOLUTION INTRAMUSCULAR; INTRAVENOUS EVERY 5 MIN PRN
Status: DISCONTINUED | OUTPATIENT
Start: 2024-02-22 | End: 2024-02-22 | Stop reason: HOSPADM

## 2024-02-22 RX ORDER — EPINEPHRINE 1 MG/ML
0.1 INJECTION, SOLUTION INTRAMUSCULAR; SUBCUTANEOUS
Status: DISCONTINUED | OUTPATIENT
Start: 2024-02-22 | End: 2024-02-22 | Stop reason: HOSPADM

## 2024-02-22 RX ORDER — PROCHLORPERAZINE MALEATE 5 MG
5 TABLET ORAL EVERY 6 HOURS PRN
Status: DISCONTINUED | OUTPATIENT
Start: 2024-02-22 | End: 2024-02-22 | Stop reason: HOSPADM

## 2024-02-22 RX ORDER — ONDANSETRON 4 MG/1
4 TABLET, ORALLY DISINTEGRATING ORAL EVERY 6 HOURS PRN
Status: DISCONTINUED | OUTPATIENT
Start: 2024-02-22 | End: 2024-02-22 | Stop reason: HOSPADM

## 2024-02-22 RX ORDER — DIPHENHYDRAMINE HYDROCHLORIDE 50 MG/ML
25-50 INJECTION INTRAMUSCULAR; INTRAVENOUS
Status: DISCONTINUED | OUTPATIENT
Start: 2024-02-22 | End: 2024-02-22 | Stop reason: HOSPADM

## 2024-02-22 RX ORDER — LIDOCAINE 40 MG/G
CREAM TOPICAL
Status: DISCONTINUED | OUTPATIENT
Start: 2024-02-22 | End: 2024-02-22 | Stop reason: HOSPADM

## 2024-02-22 RX ADMIN — MIDAZOLAM 1 MG: 1 INJECTION INTRAMUSCULAR; INTRAVENOUS at 07:52

## 2024-02-22 RX ADMIN — FENTANYL CITRATE 50 MCG: 50 INJECTION, SOLUTION INTRAMUSCULAR; INTRAVENOUS at 07:52

## 2024-02-22 RX ADMIN — SODIUM CHLORIDE 500 ML: 9 INJECTION, SOLUTION INTRAVENOUS at 07:59

## 2024-02-22 RX ADMIN — MIDAZOLAM 1 MG: 1 INJECTION INTRAMUSCULAR; INTRAVENOUS at 07:48

## 2024-02-22 RX ADMIN — FENTANYL CITRATE 50 MCG: 0.05 INJECTION, SOLUTION INTRAMUSCULAR; INTRAVENOUS at 07:48

## 2024-02-22 NOTE — H&P
Pre-Endoscopy History and Physical     Tricia Sosa MRN# [id number]   YOB: 1940 Age: 83 year old     Date of Procedure: 2/22/2024  Primary care provider: Aliya [facility]  Type of Endoscopy: Colonoscopy with possible biopsy, possible polypectomy and Gastroscopy with possible biopsy, possible dilation  Reason for Procedure: abnormal mri  Type of Anesthesia Anticipated: Conscious Sedation    HPI:    Tricia is a 83 year old female who will be undergoing the above procedure.      A history and physical has been performed. The patient's medications and allergies have been reviewed. The risks and benefits of the procedure and the sedation options and risks were discussed with the patient.  All questions were answered and informed consent was obtained.      She denies a personal or family history of anesthesia complications or bleeding disorders.     Patient Active Problem List   Diagnosis    Lymphocytic colitis    Acquired hypothyroidism    CVID (common variable immunodeficiency) (H)    B12 deficiency    CARDIOVASCULAR SCREENING; LDL GOAL LESS THAN 130    Other autoimmune hemolytic anemia (H)    Right-sided low back pain with right-sided sciatica, unspecified chronicity    Autoimmune hemolytic anemia (H)    Ulcerative colitis (H)    Bronchiectasis (H)    Mild obstructive sleep apnea    Selective deficiency of IgG subclasses (H)    Anemia, iron deficiency    Mild protein-calorie malnutrition (H24)        Past Medical History:   Diagnosis Date    Arthritis     WARD (dyspnea on exertion)     External hemorrhoids without mention of complication 06/27/2005    Hemolytic anemia (H24)     autoimmune    Hypothyroidism     IgA deficiency (H)     Myopia of both eyes with astigmatism and presbyopia 08/16/2017    Other malaise and fatigue     Other severe protein-calorie malnutrition     Other vitreous opacities 12/19/2006    Sleep apnea     Thyroid disease     Traumatic intracranial subdural hematoma  (H) 2014    Hemorrhage Subdural Trauma Without LOC Active    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, and posterior vitreous detachment    Urinary tract infection, site not specified     Recurrent UTI's        Past Surgical History:   Procedure Laterality Date    BIOPSY MUSCLE DIAGNOSTIC (LOCATION) Right 2024    Procedure: BIOPSY, MUSCLE - RIGHT THIGH;  Surgeon: Dickson Madrid MD;  Location: [facility] OR    COLONOSCOPY N/A 2016    Procedure: COLONOSCOPY;  Surgeon: Dontae Del Rio MD;  Location: [facility] GI    ENT SURGERY      Thyroid lobectomy    EYE SURGERY Bilateral 2021    Cataract Surgery    [facility] CYSTOSCOPY,INSERT URETERAL STENT      Ureteral stent insertion    [facility] FLEX SIGMOIDOSCOPY W BIOPSY  00    [facility] LAPAROSCOPY, SURGICAL; CHOLECYSTECTOMY       THYROIDECTOMY      LIGATION OF HEMORRHOID(S)      Hemorrhoidectomy    UPPER GI ENDOSCOPY,BIOPSY  10/01/01    [facility] LIGATE FALLOPIAN TUBE      [facility] COLONOSCOPY W BIOPSY  00    [facility] COLONOSCOPY W BIOPSY  10/8/03    [facility] COLONOSCOPY W BIOPSY  05    REPEAT IN 5 YEARS       Social History     Tobacco Use    Smoking status: Never    Smokeless tobacco: Never   Substance Use Topics    Alcohol use: Not Currently     Comment: 1 a day at most       Family History   Problem Relation Age of Onset    Colon Cancer Mother 90    Cerebrovascular Disease Father          at age 85    Dementia Brother     Prostate Cancer Brother     Thyroid Disease Brother     Dementia Brother     Thyroid Disease Brother     Pancreatic Cancer Brother     Breast Cancer Sister     Hyperlipidemia Sister     Depression Sister     Anxiety Disorder Sister     Thyroid Disease Sister     Lung Cancer Sister     Breast Cancer Sister     Other Cancer Sister         Lung    Colon Cancer Niece     Other Cancer Niece         leukemia       Prior to Admission medications    Medication Sig Start Date End Date Taking? Authorizing Provider    cyanocobalamin (VITAMIN  B-12) 1000 MCG tablet  8/13/15  Yes Reported, Patient   folic acid (FOLVITE) 1 MG tablet  7/31/15  Yes Reported, Patient   ketoconazole (NIZORAL) 2 % external shampoo Use every other day to scalp as needed 1/12/24  Yes Cindy Guerrero APRN CNP   levothyroxine (SYNTHROID/LEVOTHROID) 150 MCG tablet Take one tablet  by mouth daily 6/13/23  Yes Emily Church MD   triamcinolone (KENALOG) 0.1 % external ointment Apply topically 2 times daily   Yes Reported, Patient       Allergies   Allergen Reactions    Doxycycline         REVIEW OF SYSTEMS:   5 point ROS negative except as noted above in HPI, including Gen., Resp., CV, GI &  system review.    PHYSICAL EXAM:   LMP  (LMP Unknown)  Estimated body mass index is 19.94 kg/m  as calculated from the following:    Height as of 2/7/24: 1.829 m (6').    Weight as of 2/7/24: 66.7 kg (147 lb).   GENERAL APPEARANCE: alert, and oriented  MENTAL STATUS: alert  AIRWAY EXAM: Mallampatti Class I (visualization of the soft palate, fauces, uvula, anterior and posterior pillars)  RESP: lungs clear to auscultation - no rales, rhonchi or wheezes  CV: regular rates and rhythm  DIAGNOSTICS:    Not indicated    IMPRESSION   ASA Class 3 - Severe systemic disease, but not incapacitating    PLAN:   Plan for Colonoscopy with possible biopsy, possible polypectomy and Gastroscopy with possible biopsy, possible dilation. We discussed the risks, benefits and alternatives and the patient wished to proceed.    The above has been forwarded to the consulting provider.      Signed Electronically by: Dontae Del Rio MD  February 22, 2024

## 2024-02-23 PROCEDURE — 88305 TISSUE EXAM BY PATHOLOGIST: CPT | Mod: 26 | Performed by: PATHOLOGY

## 2024-02-23 PROCEDURE — 88342 IMHCHEM/IMCYTCHM 1ST ANTB: CPT | Mod: 26 | Performed by: PATHOLOGY

## 2024-02-26 ENCOUNTER — HOSPITAL ENCOUNTER (OUTPATIENT)
Dept: MRI IMAGING | Facility: CLINIC | Age: 84
Discharge: HOME | End: 2024-02-26
Attending: PHYSICIAN ASSISTANT | Admitting: PHYSICIAN ASSISTANT
Payer: MEDICARE

## 2024-02-26 DIAGNOSIS — R94.8 ABNORMAL POSITRON EMISSION TOMOGRAPHY (PET) SCAN: ICD-10-CM

## 2024-02-26 LAB
PATH REPORT.ADDENDUM SPEC: NORMAL
PATH REPORT.COMMENTS IMP SPEC: NORMAL
PATH REPORT.COMMENTS IMP SPEC: NORMAL
PATH REPORT.FINAL DX SPEC: NORMAL
PATH REPORT.GROSS SPEC: NORMAL
PATH REPORT.MICROSCOPIC SPEC OTHER STN: NORMAL
PATH REPORT.RELEVANT HX SPEC: NORMAL
PHOTO IMAGE: NORMAL

## 2024-02-26 PROCEDURE — 255N000002 HC RX 255 OP 636: Performed by: PHYSICIAN ASSISTANT

## 2024-02-26 PROCEDURE — 74183 MRI ABD W/O CNTR FLWD CNTR: CPT | Mod: MG

## 2024-02-26 PROCEDURE — A9585 GADOBUTROL INJECTION: HCPCS | Performed by: PHYSICIAN ASSISTANT

## 2024-02-26 RX ORDER — GADOBUTROL 604.72 MG/ML
7 INJECTION INTRAVENOUS ONCE
Status: COMPLETED | OUTPATIENT
Start: 2024-02-26 | End: 2024-02-26

## 2024-02-26 RX ADMIN — GADOBUTROL 7 ML: 604.72 INJECTION INTRAVENOUS at 12:55

## 2024-03-01 NOTE — PROGRESS NOTES
Oncology/Hematology Visit Note  Mar 5, 2024    Reason for Visit: Follow up of CVID, autoimmune hemolytic anemia, iron deficiency      HISTORY OF PRESENT ILLNESS: Tricia Sosa is a 83 year old female who presents with autoimmune hemolytic anemia and IgA deficiency.  She previously saw Dr. Matos and then Dr. Summers.  She was previously seen at [facility].     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  She was very sick at the time and had severe diarrhea.  She lost about 40 pounds in weight.  The workup revealed that she had markedly diminished CD4 counts of 48 and was diagnosed with CMV colitis.  Since then she has been followed in hematology clinic at [place] until recently.  She was noted to have anemia which was fairly long-standing.  She was initially referred for anemia in 2004 and also had a bone marrow biopsy done at that time which revealed hypercellular bone marrow with 70-80% cellularity and normal trilineage hematopoiesis and no morphologic features of MDS or lymphoproliferation.  Her anemia was attributed to her chronic underlying disease.  At the next evaluation in 2002 on 4 aggressive anemia there was no evidence of hemolysis, iron deficiency, B12 or folate deficiency.  Bone marrow biopsy was not pursued.  In summer of 2015 she had progressive fatigue, dyspnea on exertion and worsening anemia with a hemoglobin now of 9.  Workup at this time did suggest a hemolytic anemia with elevated LDH at 709, undetectable haptoglobin and elevated unconjugated bilirubin at 3.3.  Monospecific MARIKA was positive for both IgG and complement.  Cold agglutinin screen was positive with very low titer 1:64.  She was started on prednisone 60 mg daily with supplementation of iron folate and B12 starting 7/31/15.  Her prednisone has been slowly tapered and was discontinued off in January 2016.  She was last followed at [facility] on March 10, 2016 when she had stable hemoglobin.     She  was followed by Dr. Matos and Dr. Summers.  Due to relapse of hemolytic anemia, she underwent another course of prednisone that has since been tapered off and rituximab x 4 weekly doses completed in 9/19/19.     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  She started weekly Rituxan on 7/31/20 x 4 doses, completed on 8/21/20.  She tapered off of prednisone in October 2020.     Due to relapse of her AIHA, she started prednisone again on 6/8/21 at 60 mg daily with slow taper and finished taper in August 2021.     Due to relapse of AIHA again, she started prednisone again on 11/2/21 at 70 mg daily with slow taper.  She also completed weekly Rituxan again x 4 doses 12/6/21-12/29/21.     Venofer 5/5-5/19/22.     She is now on Rituxan every 2 months and IVIG every 8 weeks if IgG <600.    Interval History:  Tricia is doing very well. She denies any new concerns. Still dealing with leg weakness and will be seeing neurology soon. Has multiple eye appointments coming up as well. Denies any fevers, respiratory, or GI concerns. No bleeding. Does bruise easily.    Current Outpatient Medications   Medication Sig Dispense Refill    cyanocobalamin (VITAMIN  B-12) 1000 MCG tablet   3    folic acid (FOLVITE) 1 MG tablet   3    ketoconazole (NIZORAL) 2 % external shampoo Use every other day to scalp as needed 120 mL 0    levothyroxine (SYNTHROID/LEVOTHROID) 150 MCG tablet Take one tablet  by mouth daily 90 tablet 2    triamcinolone (KENALOG) 0.1 % external ointment Apply topically 2 times daily       Past Medical History  Past Medical History:   Diagnosis Date    Arthritis     WARD (dyspnea on exertion)     External hemorrhoids without mention of complication 06/27/2005    Hemolytic anemia (H24)     autoimmune    Hypothyroidism     IgA deficiency (H)     Myopia of both eyes with astigmatism and presbyopia 08/16/2017    Other malaise and fatigue     Other severe protein-calorie  malnutrition     Other vitreous opacities 12/19/2006    Sleep apnea     Thyroid disease     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    Hemorrhage Subdural Trauma Without LOC Active    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, and posterior vitreous detachment    Urinary tract infection, site not specified     Recurrent UTI's     Past Surgical History:   Procedure Laterality Date    BIOPSY MUSCLE DIAGNOSTIC (LOCATION) Right 1/16/2024    Procedure: BIOPSY, MUSCLE - RIGHT THIGH;  Surgeon: Dickson Madrid MD;  Location: [facility] OR    COLONOSCOPY N/A 4/26/2016    Procedure: COLONOSCOPY;  Surgeon: Dontae Del Rio MD;  Location:  GI    COLONOSCOPY N/A 2/22/2024    Procedure: Colonoscopy with polypectomy by using cold snare and two hemoclips applied;  Surgeon: Dontae Del Rio MD;  Location:  GI    ENT SURGERY  1985    Thyroid lobectomy    ESOPHAGOSCOPY, GASTROSCOPY, DUODENOSCOPY (EGD), COMBINED N/A 2/22/2024    Procedure: ESOPHAGOGASTRODUODENOSCOPY, WITH BIOPSY by using cold forcep;  Surgeon: Dontae Del Rio MD;  Location:  GI    EYE SURGERY Bilateral 04/20/2021    Cataract Surgery    HC CYSTOSCOPY,INSERT URETERAL STENT      Ureteral stent insertion    HC FLEX SIGMOIDOSCOPY W BIOPSY  5/30/00     LAPAROSCOPY, SURGICAL; CHOLECYSTECTOMY  1992     THYROIDECTOMY      LIGATION OF HEMORRHOID(S)      Hemorrhoidectomy    UPPER GI ENDOSCOPY,BIOPSY  10/01/01    Z LIGATE FALLOPIAN TUBE      ZZ COLONOSCOPY W BIOPSY  4/26/00    ZZHC COLONOSCOPY W BIOPSY  10/8/03    ZZHC COLONOSCOPY W BIOPSY  6/27/05    REPEAT IN 5 YEARS     Allergies   Allergen Reactions    Doxycycline      Social History   Social History     Tobacco Use    Smoking status: Never    Smokeless tobacco: Never   Vaping Use    Vaping Use: Never used   Substance Use Topics    Alcohol use: Not Currently     Comment: 1 a day at most    Drug use: No      Past medical history and social history were  reviewed.    Physical Examination:  /69   Pulse 70   Temp 97.1  F (36.2  C) (Tympanic)   Wt 67.8 kg (149 lb 7.6 oz)   LMP  (LMP Unknown)   SpO2 95%   BMI 20.27 kg/m    Wt Readings from Last 10 Encounters:   02/07/24 66.7 kg (147 lb)   01/16/24 65.5 kg (144 lb 8 oz)   01/12/24 67.1 kg (148 lb)   01/09/24 66.7 kg (147 lb)   01/05/24 67.2 kg (148 lb 3.2 oz)   12/12/23 65.3 kg (144 lb)   12/05/23 65.3 kg (144 lb)   11/24/23 66 kg (145 lb 9.6 oz)   11/14/23 63.5 kg (140 lb)   11/06/23 64.3 kg (141 lb 12.8 oz)     Constitutional: Well-appearing female in no acute distress.  Eyes: EOMI, PERRL. No scleral icterus.  ENT: Oral mucosa is moist without lesions or thrush.   Lymphatic: Neck is supple without cervical or supraclavicular lymphadenopathy.   Cardiovascular: Regular rate and rhythm. No murmurs, gallops, or rubs. No peripheral edema.  Respiratory: Clear to auscultation bilaterally. No wheezes or crackles.  Gastrointestinal: Bowel sounds present. Abdomen soft, non-tender.   Neurologic: Cranial nerves II through XII are grossly intact.  Skin: No rashes, petechiae, or bruising noted on exposed skin.    Laboratory Data:     Latest Reference Range & Units 03/05/24 11:05   Sodium 135 - 145 mmol/L 141   Potassium 3.4 - 5.3 mmol/L 4.2   Chloride 98 - 107 mmol/L 105   Carbon Dioxide (CO2) 22 - 29 mmol/L 27   Urea Nitrogen 8.0 - 23.0 mg/dL 13.6   Creatinine 0.51 - 0.95 mg/dL 0.62   GFR Estimate >60 mL/min/1.73m2 88   Calcium 8.8 - 10.2 mg/dL 8.8   Anion Gap 7 - 15 mmol/L 9   Albumin 3.5 - 5.2 g/dL 4.3   Protein Total 6.4 - 8.3 g/dL 6.2 (L)   Alkaline Phosphatase 40 - 150 U/L 120   ALT 0 - 50 U/L 16   AST 0 - 45 U/L 30   Bilirubin Total <=1.2 mg/dL 0.6   Glucose 70 - 99 mg/dL 101 (H)   Lactate Dehydrogenase 0 - 250 U/L 203   WBC 4.0 - 11.0 10e3/uL 5.0   Hemoglobin 11.7 - 15.7 g/dL 11.5 (L)   Hematocrit 35.0 - 47.0 % 34.0 (L)   Platelet Count 150 - 450 10e3/uL 134 (L)   RBC Count 3.80 - 5.20 10e6/uL 3.26 (L)   MCV 78 -  100 fL 104 (H)   MCH 26.5 - 33.0 pg 35.3 (H)   MCHC 31.5 - 36.5 g/dL 33.8   RDW 10.0 - 15.0 % 14.2   % Neutrophils % 50   % Lymphocytes % 40   % Monocytes % 10   % Eosinophils % 0   % Basophils % 0   Absolute Basophils 0.0 - 0.2 10e3/uL 0.0   Absolute Eosinophils 0.0 - 0.7 10e3/uL 0.0   Absolute Immature Granulocytes <=0.4 10e3/uL 0.0   Absolute Lymphocytes 0.8 - 5.3 10e3/uL 2.0   Absolute Monocytes 0.0 - 1.3 10e3/uL 0.5   % Immature Granulocytes % 0   Absolute Neutrophils 1.6 - 8.3 10e3/uL 2.5   Absolute NRBCs 10e3/uL 0.0   NRBCs per 100 WBC <1 /100 0   % Retic 0.5 - 2.0 % 2.0   Absolute Retic 0.025 - 0.095 10e6/uL 0.064   (L): Data is abnormally low  (H): Data is abnormally high    MRI Pancreas  MPRESSION:   1. Multiple pancreatic cystic lesions noted consistent with cystic  neoplasms. No visible worrisome features but many are too small for  characterization.  2. Splenomegaly.    Reviewed colonoscopy and endoscopy reports. Polyp benign. Signs of inactive chronic gastris.       Assessment and Plan:  # Warm Autoimmune Hemolytic Anemia  (positive MARIKA to IgG and complement). Has had multiple relapses, most recently November 2021. She completed prolonged prednisone taper and Rituxan. She is off prednisone now. Has bactrim when she is on prednisone. She is now on Rixuan every 2 months with stable labs  - CBC reviewed. Hgb and plt improved from last month. LDH, bili, retic WNL so low concern for hemolysis   - Continue with Rituxan every 2 months, due April 2024  - Will follow with [facility] for now and see new hematologist when they start this summer 2024      # CVID  Follows with immunology as well.   - Doing IVIG every 8 weeks when IGG level is <600. Will receive today  - Continue monthly monitoring   - CD4 count was low, met with ID, no indication for PJP ppx at this time      # EVI  Prior ferritin was low at 6 in April 2022. S/p Vneofer x 3.   - Iron studies WNL May 2023  - Repeat iron studies WNL  - B12 and folate levels  pending from today      # Possible Myositis  Work-up ongoing with neurology  - Has follow-up soon      # PET Findings  Recommended PET due to abnormal marrow signal on MRI. Thankfully PET without any suspicious lymph node or marrow update  - Colonic polyp benign. Had chronic inactive gastritis but otherwise nothing on EGD. Does not need further endoscopy   - Pancreas MRI with cysts <10mm. Will refer to [facility] for pancreatic cyst monitoing.     25 minutes spent on the date of the encounter doing chart review, review of test results, interpretation of tests, patient visit, and documentation     Isaiah Man PA-C  Department of Hematology and Oncology  [facility]

## 2024-03-05 ENCOUNTER — INFUSION THERAPY VISIT (OUTPATIENT)
Dept: INFUSION THERAPY | Facility: CLINIC | Age: 84
End: 2024-03-05
Attending: INTERNAL MEDICINE
Payer: MEDICARE

## 2024-03-05 ENCOUNTER — ONCOLOGY VISIT (OUTPATIENT)
Dept: ONCOLOGY | Facility: CLINIC | Age: 84
End: 2024-03-05
Attending: INTERNAL MEDICINE
Payer: MEDICARE

## 2024-03-05 VITALS
HEART RATE: 70 BPM | TEMPERATURE: 97.1 F | SYSTOLIC BLOOD PRESSURE: 122 MMHG | OXYGEN SATURATION: 95 % | BODY MASS INDEX: 20.27 KG/M2 | DIASTOLIC BLOOD PRESSURE: 69 MMHG | WEIGHT: 149.47 LBS

## 2024-03-05 VITALS
DIASTOLIC BLOOD PRESSURE: 69 MMHG | SYSTOLIC BLOOD PRESSURE: 122 MMHG | OXYGEN SATURATION: 95 % | HEART RATE: 70 BPM | TEMPERATURE: 97.1 F | BODY MASS INDEX: 20.26 KG/M2 | WEIGHT: 149.4 LBS

## 2024-03-05 DIAGNOSIS — D80.3 SELECTIVE DEFICIENCY OF IGG SUBCLASSES (H): ICD-10-CM

## 2024-03-05 DIAGNOSIS — K86.2 PANCREATIC CYST: ICD-10-CM

## 2024-03-05 DIAGNOSIS — D59.10 AUTOIMMUNE HEMOLYTIC ANEMIA (H): Primary | ICD-10-CM

## 2024-03-05 DIAGNOSIS — D83.9 CVID (COMMON VARIABLE IMMUNODEFICIENCY) (H): Primary | ICD-10-CM

## 2024-03-05 DIAGNOSIS — D83.9 CVID (COMMON VARIABLE IMMUNODEFICIENCY) (H): ICD-10-CM

## 2024-03-05 DIAGNOSIS — D59.10 AUTOIMMUNE HEMOLYTIC ANEMIA (H): ICD-10-CM

## 2024-03-05 DIAGNOSIS — D59.19 OTHER AUTOIMMUNE HEMOLYTIC ANEMIA (H): ICD-10-CM

## 2024-03-05 LAB
ALBUMIN SERPL BCG-MCNC: 4.3 G/DL (ref 3.5–5.2)
ALP SERPL-CCNC: 120 U/L (ref 40–150)
ALT SERPL W P-5'-P-CCNC: 16 U/L (ref 0–50)
ANION GAP SERPL CALCULATED.3IONS-SCNC: 9 MMOL/L (ref 7–15)
AST SERPL W P-5'-P-CCNC: 30 U/L (ref 0–45)
BASOPHILS # BLD AUTO: 0 10E3/UL (ref 0–0.2)
BASOPHILS NFR BLD AUTO: 0 %
BILIRUB SERPL-MCNC: 0.6 MG/DL
BUN SERPL-MCNC: 13.6 MG/DL (ref 8–23)
CALCIUM SERPL-MCNC: 8.8 MG/DL (ref 8.8–10.2)
CHLORIDE SERPL-SCNC: 105 MMOL/L (ref 98–107)
CREAT SERPL-MCNC: 0.62 MG/DL (ref 0.51–0.95)
DEPRECATED HCO3 PLAS-SCNC: 27 MMOL/L (ref 22–29)
EGFRCR SERPLBLD CKD-EPI 2021: 88 ML/MIN/1.73M2
EOSINOPHIL # BLD AUTO: 0 10E3/UL (ref 0–0.7)
EOSINOPHIL NFR BLD AUTO: 0 %
ERYTHROCYTE [DISTWIDTH] IN BLOOD BY AUTOMATED COUNT: 14.2 % (ref 10–15)
FOLATE SERPL-MCNC: >40 NG/ML (ref 4.6–34.8)
GLUCOSE SERPL-MCNC: 101 MG/DL (ref 70–99)
HAPTOGLOB SERPL-MCNC: <10 MG/DL (ref 30–200)
HCT VFR BLD AUTO: 34 % (ref 35–47)
HGB BLD-MCNC: 11.5 G/DL (ref 11.7–15.7)
IMM GRANULOCYTES # BLD: 0 10E3/UL
IMM GRANULOCYTES NFR BLD: 0 %
LDH SERPL L TO P-CCNC: 203 U/L (ref 0–250)
LYMPHOCYTES # BLD AUTO: 2 10E3/UL (ref 0.8–5.3)
LYMPHOCYTES NFR BLD AUTO: 40 %
MCH RBC QN AUTO: 35.3 PG (ref 26.5–33)
MCHC RBC AUTO-ENTMCNC: 33.8 G/DL (ref 31.5–36.5)
MCV RBC AUTO: 104 FL (ref 78–100)
MONOCYTES # BLD AUTO: 0.5 10E3/UL (ref 0–1.3)
MONOCYTES NFR BLD AUTO: 10 %
NEUTROPHILS # BLD AUTO: 2.5 10E3/UL (ref 1.6–8.3)
NEUTROPHILS NFR BLD AUTO: 50 %
NRBC # BLD AUTO: 0 10E3/UL
NRBC BLD AUTO-RTO: 0 /100
PLATELET # BLD AUTO: 134 10E3/UL (ref 150–450)
POTASSIUM SERPL-SCNC: 4.2 MMOL/L (ref 3.4–5.3)
PROT SERPL-MCNC: 6.2 G/DL (ref 6.4–8.3)
RBC # BLD AUTO: 3.26 10E6/UL (ref 3.8–5.2)
RETICS # AUTO: 0.06 10E6/UL (ref 0.03–0.1)
RETICS/RBC NFR AUTO: 2 % (ref 0.5–2)
SODIUM SERPL-SCNC: 141 MMOL/L (ref 135–145)
VIT B12 SERPL-MCNC: 1811 PG/ML (ref 232–1245)
WBC # BLD AUTO: 5 10E3/UL (ref 4–11)

## 2024-03-05 PROCEDURE — 36415 COLL VENOUS BLD VENIPUNCTURE: CPT

## 2024-03-05 PROCEDURE — 250N000011 HC RX IP 250 OP 636: Performed by: INTERNAL MEDICINE

## 2024-03-05 PROCEDURE — 82784 ASSAY IGA/IGD/IGG/IGM EACH: CPT | Performed by: INTERNAL MEDICINE

## 2024-03-05 PROCEDURE — 80053 COMPREHEN METABOLIC PANEL: CPT | Performed by: INTERNAL MEDICINE

## 2024-03-05 PROCEDURE — 85045 AUTOMATED RETICULOCYTE COUNT: CPT | Performed by: INTERNAL MEDICINE

## 2024-03-05 PROCEDURE — 99214 OFFICE O/P EST MOD 30 MIN: CPT | Performed by: PHYSICIAN ASSISTANT

## 2024-03-05 PROCEDURE — 83010 ASSAY OF HAPTOGLOBIN QUANT: CPT | Performed by: INTERNAL MEDICINE

## 2024-03-05 PROCEDURE — G0463 HOSPITAL OUTPT CLINIC VISIT: HCPCS | Performed by: PHYSICIAN ASSISTANT

## 2024-03-05 PROCEDURE — 82746 ASSAY OF FOLIC ACID SERUM: CPT | Performed by: PHYSICIAN ASSISTANT

## 2024-03-05 PROCEDURE — 83615 LACTATE (LD) (LDH) ENZYME: CPT | Performed by: INTERNAL MEDICINE

## 2024-03-05 PROCEDURE — 96375 TX/PRO/DX INJ NEW DRUG ADDON: CPT

## 2024-03-05 PROCEDURE — 85048 AUTOMATED LEUKOCYTE COUNT: CPT | Performed by: INTERNAL MEDICINE

## 2024-03-05 PROCEDURE — 82607 VITAMIN B-12: CPT | Performed by: PHYSICIAN ASSISTANT

## 2024-03-05 PROCEDURE — 96365 THER/PROPH/DIAG IV INF INIT: CPT

## 2024-03-05 PROCEDURE — 96366 THER/PROPH/DIAG IV INF ADDON: CPT

## 2024-03-05 RX ORDER — MEPERIDINE HYDROCHLORIDE 25 MG/ML
25 INJECTION INTRAMUSCULAR; INTRAVENOUS; SUBCUTANEOUS EVERY 30 MIN PRN
Status: CANCELLED | OUTPATIENT
Start: 2024-04-30

## 2024-03-05 RX ORDER — ALBUTEROL SULFATE 90 UG/1
1-2 AEROSOL, METERED RESPIRATORY (INHALATION)
Status: CANCELLED
Start: 2024-04-30

## 2024-03-05 RX ORDER — HEPARIN SODIUM (PORCINE) LOCK FLUSH IV SOLN 100 UNIT/ML 100 UNIT/ML
5 SOLUTION INTRAVENOUS
Status: CANCELLED | OUTPATIENT
Start: 2024-04-30

## 2024-03-05 RX ORDER — DIPHENHYDRAMINE HYDROCHLORIDE 50 MG/ML
50 INJECTION INTRAMUSCULAR; INTRAVENOUS
Status: CANCELLED
Start: 2024-04-30

## 2024-03-05 RX ORDER — HEPARIN SODIUM,PORCINE 10 UNIT/ML
5 VIAL (ML) INTRAVENOUS
Status: CANCELLED | OUTPATIENT
Start: 2024-04-30

## 2024-03-05 RX ORDER — METHYLPREDNISOLONE SODIUM SUCCINATE 125 MG/2ML
125 INJECTION, POWDER, LYOPHILIZED, FOR SOLUTION INTRAMUSCULAR; INTRAVENOUS
Status: CANCELLED
Start: 2024-04-30

## 2024-03-05 RX ORDER — METHYLPREDNISOLONE SODIUM SUCCINATE 40 MG/ML
20 INJECTION, POWDER, LYOPHILIZED, FOR SOLUTION INTRAMUSCULAR; INTRAVENOUS ONCE
Status: CANCELLED
Start: 2024-05-01 | End: 2024-04-30

## 2024-03-05 RX ORDER — METHYLPREDNISOLONE SODIUM SUCCINATE 40 MG/ML
20 INJECTION, POWDER, LYOPHILIZED, FOR SOLUTION INTRAMUSCULAR; INTRAVENOUS ONCE
Status: COMPLETED | OUTPATIENT
Start: 2024-03-05 | End: 2024-03-05

## 2024-03-05 RX ORDER — ALBUTEROL SULFATE 0.83 MG/ML
2.5 SOLUTION RESPIRATORY (INHALATION)
Status: CANCELLED | OUTPATIENT
Start: 2024-04-30

## 2024-03-05 RX ORDER — EPINEPHRINE 1 MG/ML
0.3 INJECTION, SOLUTION INTRAMUSCULAR; SUBCUTANEOUS EVERY 5 MIN PRN
Status: CANCELLED | OUTPATIENT
Start: 2024-04-30

## 2024-03-05 RX ORDER — NALOXONE HYDROCHLORIDE 0.4 MG/ML
0.2 INJECTION, SOLUTION INTRAMUSCULAR; INTRAVENOUS; SUBCUTANEOUS
Status: CANCELLED | OUTPATIENT
Start: 2024-04-30

## 2024-03-05 RX ADMIN — HUMAN IMMUNOGLOBULIN G 35 G: 20 LIQUID INTRAVENOUS at 11:32

## 2024-03-05 RX ADMIN — METHYLPREDNISOLONE SODIUM SUCCINATE 20 MG: 40 INJECTION, POWDER, FOR SOLUTION INTRAMUSCULAR; INTRAVENOUS at 11:26

## 2024-03-05 NOTE — LETTER
3/5/2024         RE: Tricia Sosa  [id number]  [place] [id number]        Dear Colleague,    Thank you for referring your patient, Tricia Sosa, to the [facility] [place]. Please see a copy of my visit note below.    Oncology/Hematology Visit Note  Mar 5, 2024    Reason for Visit: Follow up of CVID, autoimmune hemolytic anemia, iron deficiency      HISTORY OF PRESENT ILLNESS: Tricia Sosa is a 83 year old female who presents with autoimmune hemolytic anemia and IgA deficiency.  She previously saw Dr. Matos and then Dr. Summers.  She was previously seen at [facility].     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  She was very sick at the time and had severe diarrhea.  She lost about 40 pounds in weight.  The workup revealed that she had markedly diminished CD4 counts of 48 and was diagnosed with CMV colitis.  Since then she has been followed in hematology clinic at [place] until recently.  She was noted to have anemia which was fairly long-standing.  She was initially referred for anemia in 2004 and also had a bone marrow biopsy done at that time which revealed hypercellular bone marrow with 70-80% cellularity and normal trilineage hematopoiesis and no morphologic features of MDS or lymphoproliferation.  Her anemia was attributed to her chronic underlying disease.  At the next evaluation in 2002 on 4 aggressive anemia there was no evidence of hemolysis, iron deficiency, B12 or folate deficiency.  Bone marrow biopsy was not pursued.  In summer of 2015 she had progressive fatigue, dyspnea on exertion and worsening anemia with a hemoglobin now of 9.  Workup at this time did suggest a hemolytic anemia with elevated LDH at 709, undetectable haptoglobin and elevated unconjugated bilirubin at 3.3.  Monospecific MARIKA was positive for both IgG and complement.  Cold agglutinin screen was positive with very low titer 1:64.  She was started on  prednisone 60 mg daily with supplementation of iron folate and B12 starting 7/31/15.  Her prednisone has been slowly tapered and was discontinued off in January 2016.  She was last followed at [facility] on March 10, 2016 when she had stable hemoglobin.     She was followed by Dr. Matos and Dr. Summers.  Due to relapse of hemolytic anemia, she underwent another course of prednisone that has since been tapered off and rituximab x 4 weekly doses completed in 9/19/19.     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  She started weekly Rituxan on 7/31/20 x 4 doses, completed on 8/21/20.  She tapered off of prednisone in October 2020.     Due to relapse of her AIHA, she started prednisone again on 6/8/21 at 60 mg daily with slow taper and finished taper in August 2021.     Due to relapse of AIHA again, she started prednisone again on 11/2/21 at 70 mg daily with slow taper.  She also completed weekly Rituxan again x 4 doses 12/6/21-12/29/21.     Venofer 5/5-5/19/22.     She is now on Rituxan every 2 months and IVIG every 8 weeks if IgG <600.    Interval History:  Tricia is doing very well. She denies any new concerns. Still dealing with leg weakness and will be seeing neurology soon. Has multiple eye appointments coming up as well. Denies any fevers, respiratory, or GI concerns. No bleeding. Does bruise easily.    Current Outpatient Medications   Medication Sig Dispense Refill     cyanocobalamin (VITAMIN  B-12) 1000 MCG tablet   3     folic acid (FOLVITE) 1 MG tablet   3     ketoconazole (NIZORAL) 2 % external shampoo Use every other day to scalp as needed 120 mL 0     levothyroxine (SYNTHROID/LEVOTHROID) 150 MCG tablet Take one tablet  by mouth daily 90 tablet 2     triamcinolone (KENALOG) 0.1 % external ointment Apply topically 2 times daily       Past Medical History  Past Medical History:   Diagnosis Date     Arthritis      WARD (dyspnea on exertion)       External hemorrhoids without mention of complication 06/27/2005     Hemolytic anemia (H24)     autoimmune     Hypothyroidism      IgA deficiency (H)      Myopia of both eyes with astigmatism and presbyopia 08/16/2017     Other malaise and fatigue      Other severe protein-calorie malnutrition      Other vitreous opacities 12/19/2006     Sleep apnea      Thyroid disease      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    Hemorrhage Subdural Trauma Without LOC Active     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, and posterior vitreous detachment     Urinary tract infection, site not specified     Recurrent UTI's     Past Surgical History:   Procedure Laterality Date     BIOPSY MUSCLE DIAGNOSTIC (LOCATION) Right 1/16/2024    Procedure: BIOPSY, MUSCLE - RIGHT THIGH;  Surgeon: Dickson Madrid MD;  Location: [facility] OR     COLONOSCOPY N/A 4/26/2016    Procedure: COLONOSCOPY;  Surgeon: Dontae Del Rio MD;  Location:  GI     COLONOSCOPY N/A 2/22/2024    Procedure: Colonoscopy with polypectomy by using cold snare and two hemoclips applied;  Surgeon: Dontae Del Rio MD;  Location:  GI     ENT SURGERY  1985    Thyroid lobectomy     ESOPHAGOSCOPY, GASTROSCOPY, DUODENOSCOPY (EGD), COMBINED N/A 2/22/2024    Procedure: ESOPHAGOGASTRODUODENOSCOPY, WITH BIOPSY by using cold forcep;  Surgeon: Dontae Del Rio MD;  Location:  GI     EYE SURGERY Bilateral 04/20/2021    Cataract Surgery      CYSTOSCOPY,INSERT URETERAL STENT      Ureteral stent insertion     HC FLEX SIGMOIDOSCOPY W BIOPSY  5/30/00      LAPAROSCOPY, SURGICAL; CHOLECYSTECTOMY  1992      THYROIDECTOMY       LIGATION OF HEMORRHOID(S)      Hemorrhoidectomy     UPPER GI ENDOSCOPY,BIOPSY  10/01/01     [facility] LIGATE FALLOPIAN TUBE       ZZ COLONOSCOPY W BIOPSY  4/26/00     ZZHC COLONOSCOPY W BIOPSY  10/8/03     ZZ COLONOSCOPY W BIOPSY  6/27/05    REPEAT IN 5 YEARS     Allergies   Allergen Reactions     Doxycycline       Social History   Social History     Tobacco Use     Smoking status: Never     Smokeless tobacco: Never   Vaping Use     Vaping Use: Never used   Substance Use Topics     Alcohol use: Not Currently     Comment: 1 a day at most     Drug use: No      Past medical history and social history were reviewed.    Physical Examination:  /69   Pulse 70   Temp 97.1  F (36.2  C) (Tympanic)   Wt 67.8 kg (149 lb 7.6 oz)   LMP  (LMP Unknown)   SpO2 95%   BMI 20.27 kg/m    Wt Readings from Last 10 Encounters:   02/07/24 66.7 kg (147 lb)   01/16/24 65.5 kg (144 lb 8 oz)   01/12/24 67.1 kg (148 lb)   01/09/24 66.7 kg (147 lb)   01/05/24 67.2 kg (148 lb 3.2 oz)   12/12/23 65.3 kg (144 lb)   12/05/23 65.3 kg (144 lb)   11/24/23 66 kg (145 lb 9.6 oz)   11/14/23 63.5 kg (140 lb)   11/06/23 64.3 kg (141 lb 12.8 oz)     Constitutional: Well-appearing female in no acute distress.  Eyes: EOMI, PERRL. No scleral icterus.  ENT: Oral mucosa is moist without lesions or thrush.   Lymphatic: Neck is supple without cervical or supraclavicular lymphadenopathy.   Cardiovascular: Regular rate and rhythm. No murmurs, gallops, or rubs. No peripheral edema.  Respiratory: Clear to auscultation bilaterally. No wheezes or crackles.  Gastrointestinal: Bowel sounds present. Abdomen soft, non-tender.   Neurologic: Cranial nerves II through XII are grossly intact.  Skin: No rashes, petechiae, or bruising noted on exposed skin.    Laboratory Data:     Latest Reference Range & Units 03/05/24 11:05   Sodium 135 - 145 mmol/L 141   Potassium 3.4 - 5.3 mmol/L 4.2   Chloride 98 - 107 mmol/L 105   Carbon Dioxide (CO2) 22 - 29 mmol/L 27   Urea Nitrogen 8.0 - 23.0 mg/dL 13.6   Creatinine 0.51 - 0.95 mg/dL 0.62   GFR Estimate >60 mL/min/1.73m2 88   Calcium 8.8 - 10.2 mg/dL 8.8   Anion Gap 7 - 15 mmol/L 9   Albumin 3.5 - 5.2 g/dL 4.3   Protein Total 6.4 - 8.3 g/dL 6.2 (L)   Alkaline Phosphatase 40 - 150 U/L 120   ALT 0 - 50 U/L 16   AST 0 - 45 U/L 30    Bilirubin Total <=1.2 mg/dL 0.6   Glucose 70 - 99 mg/dL 101 (H)   Lactate Dehydrogenase 0 - 250 U/L 203   WBC 4.0 - 11.0 10e3/uL 5.0   Hemoglobin 11.7 - 15.7 g/dL 11.5 (L)   Hematocrit 35.0 - 47.0 % 34.0 (L)   Platelet Count 150 - 450 10e3/uL 134 (L)   RBC Count 3.80 - 5.20 10e6/uL 3.26 (L)   MCV 78 - 100 fL 104 (H)   MCH 26.5 - 33.0 pg 35.3 (H)   MCHC 31.5 - 36.5 g/dL 33.8   RDW 10.0 - 15.0 % 14.2   % Neutrophils % 50   % Lymphocytes % 40   % Monocytes % 10   % Eosinophils % 0   % Basophils % 0   Absolute Basophils 0.0 - 0.2 10e3/uL 0.0   Absolute Eosinophils 0.0 - 0.7 10e3/uL 0.0   Absolute Immature Granulocytes <=0.4 10e3/uL 0.0   Absolute Lymphocytes 0.8 - 5.3 10e3/uL 2.0   Absolute Monocytes 0.0 - 1.3 10e3/uL 0.5   % Immature Granulocytes % 0   Absolute Neutrophils 1.6 - 8.3 10e3/uL 2.5   Absolute NRBCs 10e3/uL 0.0   NRBCs per 100 WBC <1 /100 0   % Retic 0.5 - 2.0 % 2.0   Absolute Retic 0.025 - 0.095 10e6/uL 0.064   (L): Data is abnormally low  (H): Data is abnormally high    MRI Pancreas  MPRESSION:   1. Multiple pancreatic cystic lesions noted consistent with cystic  neoplasms. No visible worrisome features but many are too small for  characterization.  2. Splenomegaly.    Reviewed colonoscopy and endoscopy reports. Polyp benign. Signs of inactive chronic gastris.       Assessment and Plan:  # Warm Autoimmune Hemolytic Anemia  (positive MARIKA to IgG and complement). Has had multiple relapses, most recently November 2021. She completed prolonged prednisone taper and Rituxan. She is off prednisone now. Has bactrim when she is on prednisone. She is now on Rixuan every 2 months with stable labs  - CBC reviewed. Hgb and plt improved from last month. LDH, bili, retic WNL so low concern for hemolysis   - Continue with Rituxan every 2 months, due April 2024  - Will follow with [facility] for now and see new hematologist when they start this summer 2024      # CVID  Follows with immunology as well.   - Doing IVIG every 8  weeks when IGG level is <600. Will receive today  - Continue monthly monitoring   - CD4 count was low, met with ID, no indication for PJP ppx at this time      # EVI  Prior ferritin was low at 6 in April 2022. S/p Vneofer x 3.   - Iron studies WNL May 2023  - Repeat iron studies WNL  - B12 and folate levels pending from today      # Possible Myositis  Work-up ongoing with neurology  - Has follow-up soon      # PET Findings  Recommended PET due to abnormal marrow signal on MRI. Thankfully PET without any suspicious lymph node or marrow update  - Colonic polyp benign. Had chronic inactive gastritis but otherwise nothing on EGD. Does not need further endoscopy   - Pancreas MRI with cysts <10mm. Will refer to [facility] for pancreatic cyst monitoing.     25 minutes spent on the date of the encounter doing chart review, review of test results, interpretation of tests, patient visit, and documentation     Isaiah Man PA-C  Department of Hematology and Oncology  [facility]       Patient seen in infusion vitals transferred.       Again, thank you for allowing me to participate in the care of your patient.        Sincerely,        CHELSEA Marlow

## 2024-03-06 ENCOUNTER — PATIENT OUTREACH (OUTPATIENT)
Dept: ONCOLOGY | Facility: CLINIC | Age: 84
End: 2024-03-06
Payer: MEDICARE

## 2024-03-06 LAB
IGA SERPL-MCNC: <2 MG/DL (ref 84–499)
IGG SERPL-MCNC: 440 MG/DL (ref 610–1616)
IGM SERPL-MCNC: <10 MG/DL (ref 35–242)

## 2024-03-06 NOTE — TELEPHONE ENCOUNTER
[facility]: Cancer Care                                                                                          Referral faxed to [facility] for pancreatic cysts confirmed via [employer]. Clinic to call and schedule patient.     Will continue to follow as needed.       Titus Alva, RN, BSN.  RN Care Coordinator     [facility]   [phone]    PRINCIPAL DISCHARGE DIAGNOSIS  Diagnosis: Debility  Assessment and Plan of Treatment: Vn services on dc      SECONDARY DISCHARGE DIAGNOSES  Diagnosis: CHF exacerbation  Assessment and Plan of Treatment: continue meds as ordered-follow up with PMD as OPD    Diagnosis: Atrial fibrillation  Assessment and Plan of Treatment: continue eliquis, toprol- follow up with cardiology as OPD    Diagnosis: Acute respiratory failure  Assessment and Plan of Treatment: SP pneumonia- resolved- continue resp Tx- follow up with PMD/ pulmonary as OPD    Diagnosis: Acute kidney injury superimposed on CKD  Assessment and Plan of Treatment: BUN/CR stable on DC- continue Lasix as ordered- follow up with PMD to adjust Meds

## 2024-04-03 ENCOUNTER — ONCOLOGY VISIT (OUTPATIENT)
Dept: ONCOLOGY | Facility: CLINIC | Age: 84
End: 2024-04-03
Attending: NURSE PRACTITIONER
Payer: MEDICARE

## 2024-04-03 ENCOUNTER — LAB (OUTPATIENT)
Dept: INFUSION THERAPY | Facility: CLINIC | Age: 84
End: 2024-04-03
Attending: NURSE PRACTITIONER
Payer: MEDICARE

## 2024-04-03 VITALS
HEIGHT: 71 IN | SYSTOLIC BLOOD PRESSURE: 144 MMHG | DIASTOLIC BLOOD PRESSURE: 72 MMHG | HEART RATE: 68 BPM | WEIGHT: 149.6 LBS | RESPIRATION RATE: 24 BRPM | OXYGEN SATURATION: 97 % | BODY MASS INDEX: 20.94 KG/M2 | TEMPERATURE: 97.8 F

## 2024-04-03 DIAGNOSIS — D59.10 AUTOIMMUNE HEMOLYTIC ANEMIA (H): ICD-10-CM

## 2024-04-03 DIAGNOSIS — D83.9 CVID (COMMON VARIABLE IMMUNODEFICIENCY) (H): ICD-10-CM

## 2024-04-03 DIAGNOSIS — D59.19 OTHER AUTOIMMUNE HEMOLYTIC ANEMIA (H): ICD-10-CM

## 2024-04-03 DIAGNOSIS — D83.9 CVID (COMMON VARIABLE IMMUNODEFICIENCY) (H): Primary | ICD-10-CM

## 2024-04-03 DIAGNOSIS — D59.19 OTHER AUTOIMMUNE HEMOLYTIC ANEMIA (H): Primary | ICD-10-CM

## 2024-04-03 LAB
ALBUMIN SERPL BCG-MCNC: 4.3 G/DL (ref 3.5–5.2)
ALP SERPL-CCNC: 108 U/L (ref 40–150)
ALT SERPL W P-5'-P-CCNC: 16 U/L (ref 0–50)
ANION GAP SERPL CALCULATED.3IONS-SCNC: 11 MMOL/L (ref 7–15)
AST SERPL W P-5'-P-CCNC: 31 U/L (ref 0–45)
BASOPHILS # BLD AUTO: 0 10E3/UL (ref 0–0.2)
BASOPHILS NFR BLD AUTO: 1 %
BILIRUB SERPL-MCNC: 0.6 MG/DL
BUN SERPL-MCNC: 13.6 MG/DL (ref 8–23)
CALCIUM SERPL-MCNC: 8.6 MG/DL (ref 8.8–10.2)
CHLORIDE SERPL-SCNC: 104 MMOL/L (ref 98–107)
CREAT SERPL-MCNC: 0.59 MG/DL (ref 0.51–0.95)
DEPRECATED HCO3 PLAS-SCNC: 24 MMOL/L (ref 22–29)
EGFRCR SERPLBLD CKD-EPI 2021: 89 ML/MIN/1.73M2
EOSINOPHIL # BLD AUTO: 0 10E3/UL (ref 0–0.7)
EOSINOPHIL NFR BLD AUTO: 0 %
ERYTHROCYTE [DISTWIDTH] IN BLOOD BY AUTOMATED COUNT: 14.3 % (ref 10–15)
GLUCOSE SERPL-MCNC: 68 MG/DL (ref 70–99)
HAPTOGLOB SERPL-MCNC: <10 MG/DL (ref 30–200)
HCT VFR BLD AUTO: 32.2 % (ref 35–47)
HGB BLD-MCNC: 11.1 G/DL (ref 11.7–15.7)
IMM GRANULOCYTES # BLD: 0 10E3/UL
IMM GRANULOCYTES NFR BLD: 1 %
LDH SERPL L TO P-CCNC: 211 U/L (ref 0–250)
LYMPHOCYTES # BLD AUTO: 2 10E3/UL (ref 0.8–5.3)
LYMPHOCYTES NFR BLD AUTO: 47 %
MCH RBC QN AUTO: 36 PG (ref 26.5–33)
MCHC RBC AUTO-ENTMCNC: 34.5 G/DL (ref 31.5–36.5)
MCV RBC AUTO: 105 FL (ref 78–100)
MONOCYTES # BLD AUTO: 0.4 10E3/UL (ref 0–1.3)
MONOCYTES NFR BLD AUTO: 9 %
NEUTROPHILS # BLD AUTO: 1.7 10E3/UL (ref 1.6–8.3)
NEUTROPHILS NFR BLD AUTO: 42 %
NRBC # BLD AUTO: 0 10E3/UL
NRBC BLD AUTO-RTO: 0 /100
PLATELET # BLD AUTO: 120 10E3/UL (ref 150–450)
POTASSIUM SERPL-SCNC: 4.2 MMOL/L (ref 3.4–5.3)
PROT SERPL-MCNC: 6.1 G/DL (ref 6.4–8.3)
RBC # BLD AUTO: 3.08 10E6/UL (ref 3.8–5.2)
RETICS # AUTO: 0.06 10E6/UL (ref 0.03–0.1)
RETICS/RBC NFR AUTO: 1.9 % (ref 0.5–2)
SODIUM SERPL-SCNC: 139 MMOL/L (ref 135–145)
WBC # BLD AUTO: 4.1 10E3/UL (ref 4–11)

## 2024-04-03 PROCEDURE — 96413 CHEMO IV INFUSION 1 HR: CPT

## 2024-04-03 PROCEDURE — 83615 LACTATE (LD) (LDH) ENZYME: CPT | Performed by: INTERNAL MEDICINE

## 2024-04-03 PROCEDURE — 99213 OFFICE O/P EST LOW 20 MIN: CPT | Performed by: NURSE PRACTITIONER

## 2024-04-03 PROCEDURE — 258N000003 HC RX IP 258 OP 636: Performed by: NURSE PRACTITIONER

## 2024-04-03 PROCEDURE — 250N000011 HC RX IP 250 OP 636: Mod: JZ | Performed by: NURSE PRACTITIONER

## 2024-04-03 PROCEDURE — 250N000013 HC RX MED GY IP 250 OP 250 PS 637: Performed by: NURSE PRACTITIONER

## 2024-04-03 PROCEDURE — 85048 AUTOMATED LEUKOCYTE COUNT: CPT | Performed by: INTERNAL MEDICINE

## 2024-04-03 PROCEDURE — G0463 HOSPITAL OUTPT CLINIC VISIT: HCPCS | Mod: 25 | Performed by: NURSE PRACTITIONER

## 2024-04-03 PROCEDURE — 36415 COLL VENOUS BLD VENIPUNCTURE: CPT

## 2024-04-03 PROCEDURE — 96415 CHEMO IV INFUSION ADDL HR: CPT

## 2024-04-03 PROCEDURE — 85045 AUTOMATED RETICULOCYTE COUNT: CPT | Performed by: INTERNAL MEDICINE

## 2024-04-03 PROCEDURE — 83010 ASSAY OF HAPTOGLOBIN QUANT: CPT | Performed by: INTERNAL MEDICINE

## 2024-04-03 PROCEDURE — 84450 TRANSFERASE (AST) (SGOT): CPT | Performed by: INTERNAL MEDICINE

## 2024-04-03 PROCEDURE — 82784 ASSAY IGA/IGD/IGG/IGM EACH: CPT | Performed by: INTERNAL MEDICINE

## 2024-04-03 RX ORDER — EPINEPHRINE 1 MG/ML
0.3 INJECTION, SOLUTION INTRAMUSCULAR; SUBCUTANEOUS EVERY 5 MIN PRN
Status: CANCELLED | OUTPATIENT
Start: 2024-04-03

## 2024-04-03 RX ORDER — HEPARIN SODIUM,PORCINE 10 UNIT/ML
5 VIAL (ML) INTRAVENOUS
Status: CANCELLED | OUTPATIENT
Start: 2024-04-03

## 2024-04-03 RX ORDER — ACETAMINOPHEN 325 MG/1
650 TABLET ORAL ONCE
Status: CANCELLED
Start: 2024-04-03

## 2024-04-03 RX ORDER — ACETAMINOPHEN 325 MG/1
650 TABLET ORAL ONCE
Status: COMPLETED | OUTPATIENT
Start: 2024-04-03 | End: 2024-04-03

## 2024-04-03 RX ORDER — DIPHENHYDRAMINE HCL 25 MG
50 CAPSULE ORAL ONCE
Status: COMPLETED | OUTPATIENT
Start: 2024-04-03 | End: 2024-04-03

## 2024-04-03 RX ORDER — METHYLPREDNISOLONE SODIUM SUCCINATE 125 MG/2ML
125 INJECTION, POWDER, LYOPHILIZED, FOR SOLUTION INTRAMUSCULAR; INTRAVENOUS
Status: CANCELLED
Start: 2024-04-03

## 2024-04-03 RX ORDER — NALOXONE HYDROCHLORIDE 0.4 MG/ML
0.2 INJECTION, SOLUTION INTRAMUSCULAR; INTRAVENOUS; SUBCUTANEOUS
Status: CANCELLED | OUTPATIENT
Start: 2024-04-03

## 2024-04-03 RX ORDER — DIPHENHYDRAMINE HCL 25 MG
50 CAPSULE ORAL ONCE
Status: CANCELLED
Start: 2024-04-03

## 2024-04-03 RX ORDER — MEPERIDINE HYDROCHLORIDE 25 MG/ML
25 INJECTION INTRAMUSCULAR; INTRAVENOUS; SUBCUTANEOUS
Status: CANCELLED
Start: 2024-04-03

## 2024-04-03 RX ORDER — MEPERIDINE HYDROCHLORIDE 25 MG/ML
25 INJECTION INTRAMUSCULAR; INTRAVENOUS; SUBCUTANEOUS EVERY 30 MIN PRN
Status: CANCELLED | OUTPATIENT
Start: 2024-04-03

## 2024-04-03 RX ORDER — ALBUTEROL SULFATE 90 UG/1
1-2 AEROSOL, METERED RESPIRATORY (INHALATION)
Status: CANCELLED
Start: 2024-04-03

## 2024-04-03 RX ORDER — HEPARIN SODIUM (PORCINE) LOCK FLUSH IV SOLN 100 UNIT/ML 100 UNIT/ML
5 SOLUTION INTRAVENOUS
Status: CANCELLED | OUTPATIENT
Start: 2024-04-03

## 2024-04-03 RX ORDER — ALBUTEROL SULFATE 0.83 MG/ML
2.5 SOLUTION RESPIRATORY (INHALATION)
Status: CANCELLED | OUTPATIENT
Start: 2024-04-03

## 2024-04-03 RX ORDER — DIPHENHYDRAMINE HYDROCHLORIDE 50 MG/ML
50 INJECTION INTRAMUSCULAR; INTRAVENOUS
Status: CANCELLED
Start: 2024-04-03

## 2024-04-03 RX ADMIN — DIPHENHYDRAMINE HYDROCHLORIDE 25 MG: 25 CAPSULE ORAL at 14:31

## 2024-04-03 RX ADMIN — SODIUM CHLORIDE 250 ML: 9 INJECTION, SOLUTION INTRAVENOUS at 14:40

## 2024-04-03 RX ADMIN — ACETAMINOPHEN 325 MG: 325 TABLET ORAL at 14:31

## 2024-04-03 RX ADMIN — RITUXIMAB-ABBS 700 MG: 10 INJECTION, SOLUTION INTRAVENOUS at 14:41

## 2024-04-03 ASSESSMENT — PAIN SCALES - GENERAL: PAINLEVEL: MILD PAIN (2)

## 2024-04-03 NOTE — LETTER
4/3/2024         RE: Tricia Sosa  [id number]  [facility] [id number]        Dear Colleague,    Thank you for referring your patient, Tricia Sosa, to the [facility]. Please see a copy of my visit note below.    Oncology/Hematology Visit Note  Apr 3, 2024    Reason for Visit: Follow up of CVID, autoimmune hemolytic anemia, iron deficiency      HISTORY OF PRESENT ILLNESS: Tricia Sosa is a 83 year old female who presents with autoimmune hemolytic anemia and IgA deficiency.  She previously saw Dr. Matos and then Dr. Summers.  She was previously seen at [facility].     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  She was very sick at the time and had severe diarrhea.  She lost about 40 pounds in weight.  The workup revealed that she had markedly diminished CD4 counts of 48 and was diagnosed with CMV colitis.  Since then she has been followed in hematology clinic at [place] until recently.  She was noted to have anemia which was fairly long-standing.  She was initially referred for anemia in 2004 and also had a bone marrow biopsy done at that time which revealed hypercellular bone marrow with 70-80% cellularity and normal trilineage hematopoiesis and no morphologic features of MDS or lymphoproliferation.  Her anemia was attributed to her chronic underlying disease.  At the next evaluation in 2002 on 4 aggressive anemia there was no evidence of hemolysis, iron deficiency, B12 or folate deficiency.  Bone marrow biopsy was not pursued.  In summer of 2015 she had progressive fatigue, dyspnea on exertion and worsening anemia with a hemoglobin now of 9.  Workup at this time did suggest a hemolytic anemia with elevated LDH at 709, undetectable haptoglobin and elevated unconjugated bilirubin at 3.3.  Monospecific MARIKA was positive for both IgG and complement.  Cold agglutinin screen was positive with very low titer 1:64.  She was started on  prednisone 60 mg daily with supplementation of iron folate and B12 starting 7/31/15.  Her prednisone has been slowly tapered and was discontinued off in January 2016.  She was last followed at [facility] on March 10, 2016 when she had stable hemoglobin.     She was followed by Dr. Matos and Dr. Summers.  Due to relapse of hemolytic anemia, she underwent another course of prednisone that has since been tapered off and rituximab x 4 weekly doses completed in 9/19/19.     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  She started weekly Rituxan on 7/31/20 x 4 doses, completed on 8/21/20.  She tapered off of prednisone in October 2020.     Due to relapse of her AIHA, she started prednisone again on 6/8/21 at 60 mg daily with slow taper and finished taper in August 2021.     Due to relapse of AIHA again, she started prednisone again on 11/2/21 at 70 mg daily with slow taper.  She also completed weekly Rituxan again x 4 doses 12/6/21-12/29/21.     Venofer 5/5-5/19/22.     She is now on Rituxan every 2 months and IVIG every 8 weeks if IgG <600.    Interval History:    Patient reports she continues to feel well.  Denies fever chills sweats cough shortness of breath chest pain nausea vomiting diarrhea abdominal pain or bleeding reports good energy and appetite      Physical Examination:  Constitutional: Well-appearing female in no acute distress.  Eyes: EOMI, PERRL. No scleral icterus.  ENT: Oral mucosa is moist without lesions or thrush.   Lymphatic: Neck is supple without cervical or supraclavicular lymphadenopathy.   Cardiovascular: Regular rate and rhythm. No murmurs, gallops, or rubs. No peripheral edema.  Respiratory: Clear to auscultation bilaterally. No wheezes or crackles.  Gastrointestinal: Bowel sounds present. Abdomen soft, non-tender.   Neurologic: Cranial nerves II through XII are grossly intact.  Skin: No rashes, petechiae, or bruising noted on exposed  skin.    Laboratory Data:  CBC and CMP results reviewed      Assessment and Plan:    # Warm Autoimmune Hemolytic Anemia  (positive MARIKA to IgG and complement). Has had multiple relapses, most recently November 2021. She completed prolonged prednisone taper and Rituxan. She is off prednisone now. Has bactrim when she is on prednisone. She is now on Rixuan every 2 months with stable labs  - CBC reviewed. Hgb and plt improved from last month. LDH, bili, retic WNL so low concern for hemolysis   - Continue with Rituxan every 2 months  -Schedule for rituximab in 2 months and follow-up with [facility]       # CVID  Follows with immunology as well.   - Doing IVIG every 8 weeks when IGG level is <600.  Last IVIG given in March 2024  -Schedule in in May 2024 for labs IgG and IVIG and follow-up with FEL[facility]  - CD4 count was low, met with ID, no indication for PJP ppx at this time      # EVI  Prior ferritin was low at 6 in April 2022. S/p Vneofer x 3.   - 02/2024-Iron studies WNL  Check periodically iron and iron studies    #Chronic thrombocytopenia  Overall stable       # Possible Myositis  Work-up ongoing with neurology  - Has follow-up soon      # PET Findings  Recommended PET due to abnormal marrow signal on MRI. Thankfully PET without any suspicious lymph node or marrow update  - Colonic polyp benign. Had chronic inactive gastritis but otherwise nothing on EGD. Does not need further endoscopy   - Pancreas MRI with cysts <10mm.  Referred to [facility] for pancreatic cyst monitoing.       # Low blood sugar  Asymptomatic  Will give ranges in the clinic  Advised patient to check blood sugars before meals and at bedtime    SHANIQUE Belcher CNP          Again, thank you for allowing me to participate in the care of your patient.        Sincerely,        SHANIQUE Belcher CNP

## 2024-04-03 NOTE — NURSING NOTE
"Oncology Rooming Note    April 3, 2024 1:21 PM   Tricia Sosa is a 83 year old female who presents for:    Chief Complaint   Patient presents with    Oncology Clinic Visit     Autoimmune hemolytic anemia      Initial Vitals: BP (!) 153/80   Pulse 68   Temp 97.8  F (36.6  C) (Tympanic)   Resp 24   Ht 1.81 m (5' 11.25\")   Wt 67.9 kg (149 lb 9.6 oz)   LMP  (LMP Unknown)   SpO2 97%   BMI 20.72 kg/m   Estimated body mass index is 20.72 kg/m  as calculated from the following:    Height as of this encounter: 1.81 m (5' 11.25\").    Weight as of this encounter: 67.9 kg (149 lb 9.6 oz). Body surface area is 1.85 meters squared.  Mild Pain (2) Comment: Data Unavailable   No LMP recorded (lmp unknown). Patient is postmenopausal.  Allergies reviewed: Yes  Medications reviewed: Yes    Medications: Medication refills not needed today.  Pharmacy name entered into [facility]:    [place], [place] - [address] [facility] [address] [place], [place] - [address] [facility]    Frailty Screening:   Is the patient here for a new oncology consult visit in cancer care? 2. No      Clinical concerns: follow up       Hawa Victor MA              "

## 2024-04-03 NOTE — PROGRESS NOTES
Oncology/Hematology Visit Note  Apr 3, 2024    Reason for Visit: Follow up of CVID, autoimmune hemolytic anemia, iron deficiency      HISTORY OF PRESENT ILLNESS: Tricia Sosa is a 83 year old female who presents with autoimmune hemolytic anemia and IgA deficiency.  She previously saw Dr. Matos and then Dr. Summers.  She was previously seen at [facility].     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  She was very sick at the time and had severe diarrhea.  She lost about 40 pounds in weight.  The workup revealed that she had markedly diminished CD4 counts of 48 and was diagnosed with CMV colitis.  Since then she has been followed in hematology clinic at [place] until recently.  She was noted to have anemia which was fairly long-standing.  She was initially referred for anemia in 2004 and also had a bone marrow biopsy done at that time which revealed hypercellular bone marrow with 70-80% cellularity and normal trilineage hematopoiesis and no morphologic features of MDS or lymphoproliferation.  Her anemia was attributed to her chronic underlying disease.  At the next evaluation in 2002 on 4 aggressive anemia there was no evidence of hemolysis, iron deficiency, B12 or folate deficiency.  Bone marrow biopsy was not pursued.  In summer of 2015 she had progressive fatigue, dyspnea on exertion and worsening anemia with a hemoglobin now of 9.  Workup at this time did suggest a hemolytic anemia with elevated LDH at 709, undetectable haptoglobin and elevated unconjugated bilirubin at 3.3.  Monospecific MARIKA was positive for both IgG and complement.  Cold agglutinin screen was positive with very low titer 1:64.  She was started on prednisone 60 mg daily with supplementation of iron folate and B12 starting 7/31/15.  Her prednisone has been slowly tapered and was discontinued off in January 2016.  She was last followed at [facility] on March 10, 2016 when she had stable hemoglobin.     She  was followed by Dr. Matos and Dr. Summers.  Due to relapse of hemolytic anemia, she underwent another course of prednisone that has since been tapered off and rituximab x 4 weekly doses completed in 9/19/19.     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  She started weekly Rituxan on 7/31/20 x 4 doses, completed on 8/21/20.  She tapered off of prednisone in October 2020.     Due to relapse of her AIHA, she started prednisone again on 6/8/21 at 60 mg daily with slow taper and finished taper in August 2021.     Due to relapse of AIHA again, she started prednisone again on 11/2/21 at 70 mg daily with slow taper.  She also completed weekly Rituxan again x 4 doses 12/6/21-12/29/21.     Venofer 5/5-5/19/22.     She is now on Rituxan every 2 months and IVIG every 8 weeks if IgG <600.    Interval History:    Patient reports she continues to feel well.  Denies fever chills sweats cough shortness of breath chest pain nausea vomiting diarrhea abdominal pain or bleeding reports good energy and appetite      Physical Examination:  Constitutional: Well-appearing female in no acute distress.  Eyes: EOMI, PERRL. No scleral icterus.  ENT: Oral mucosa is moist without lesions or thrush.   Lymphatic: Neck is supple without cervical or supraclavicular lymphadenopathy.   Cardiovascular: Regular rate and rhythm. No murmurs, gallops, or rubs. No peripheral edema.  Respiratory: Clear to auscultation bilaterally. No wheezes or crackles.  Gastrointestinal: Bowel sounds present. Abdomen soft, non-tender.   Neurologic: Cranial nerves II through XII are grossly intact.  Skin: No rashes, petechiae, or bruising noted on exposed skin.    Laboratory Data:  CBC and CMP results reviewed      Assessment and Plan:    # Warm Autoimmune Hemolytic Anemia  (positive MARIKA to IgG and complement). Has had multiple relapses, most recently November 2021. She completed prolonged prednisone taper and  Rituxan. She is off prednisone now. Has bactrim when she is on prednisone. She is now on Rixuan every 2 months with stable labs  - CBC reviewed. Hgb and plt improved from last month. LDH, bili, retic WNL so low concern for hemolysis   - Continue with Rituxan every 2 months  -Schedule for rituximab in 2 months and follow-up with [facility]       # CVID  Follows with immunology as well.   - Doing IVIG every 8 weeks when IGG level is <600.  Last IVIG given in March 2024  -Schedule in in May 2024 for labs IgG and IVIG and follow-up with [facility]  - CD4 count was low, met with ID, no indication for PJP ppx at this time      # EVI  Prior ferritin was low at 6 in April 2022. S/p Vneofer x 3.   - 02/2024-Iron studies WNL  Check periodically iron and iron studies    #Chronic thrombocytopenia  Overall stable       # Possible Myositis  Work-up ongoing with neurology  - Has follow-up soon      # PET Findings  Recommended PET due to abnormal marrow signal on MRI. Thankfully PET without any suspicious lymph node or marrow update  - Colonic polyp benign. Had chronic inactive gastritis but otherwise nothing on EGD. Does not need further endoscopy   - Pancreas MRI with cysts <10mm.  Referred to [facility] for pancreatic cyst monitoing.       # Low blood sugar  Asymptomatic  Will give ranges in the clinic  Advised patient to check blood sugars before meals and at bedtime    SHANIQUE Belcher CNP

## 2024-04-03 NOTE — PROGRESS NOTES
Nursing Note:  Tricia Sosa presents today for PIV and labs.    Patient seen by provider today: Yes: Arnulfo Griffin NP   present during visit today: Not Applicable.    Note: N/A.    Intravenous Access:  Labs drawn without difficulty.  Peripheral IV placed.    Discharge Plan:   Patient was sent to [facility] for NP appointment.    Pari Burk RN

## 2024-04-03 NOTE — PROGRESS NOTES
Infusion Nursing Note:  Tricia Sosa presents today for Truxima.    Patient seen by provider today: No   present during visit today: Not Applicable.    Note: Patient requests half dose of Tylenol and Benadryl. Has done this in the past and tolerates well.      Intravenous Access:  Peripheral IV placed by FT RN.    Treatment Conditions:  Not Applicable.      Post Infusion Assessment:  Patient tolerated infusion without incident.  Blood return noted pre and post infusion.  Site patent and intact, free from redness, edema or discomfort.  No evidence of extravasations.  Access discontinued per protocol.       Discharge Plan:   Discharge instructions reviewed with: Patient.  Patient and/or family verbalized understanding of discharge instructions and all questions answered.  AVS to patient via [employer].  Patient will return 5/1 for next appointment.   Patient discharged in stable condition accompanied by: self.  Departure Mode: Ambulatory.      Cece Pressley RN

## 2024-04-04 LAB
IGA SERPL-MCNC: <2 MG/DL (ref 84–499)
IGG SERPL-MCNC: 604 MG/DL (ref 610–1616)
IGM SERPL-MCNC: <10 MG/DL (ref 35–242)

## 2024-04-09 ENCOUNTER — OFFICE VISIT (OUTPATIENT)
Dept: INTERNAL MEDICINE | Facility: CLINIC | Age: 84
End: 2024-04-09
Payer: MEDICARE

## 2024-04-09 VITALS
DIASTOLIC BLOOD PRESSURE: 64 MMHG | RESPIRATION RATE: 20 BRPM | HEART RATE: 95 BPM | BODY MASS INDEX: 20.85 KG/M2 | WEIGHT: 148.9 LBS | TEMPERATURE: 98.1 F | SYSTOLIC BLOOD PRESSURE: 130 MMHG | HEIGHT: 71 IN | OXYGEN SATURATION: 97 %

## 2024-04-09 DIAGNOSIS — E03.9 ACQUIRED HYPOTHYROIDISM: ICD-10-CM

## 2024-04-09 DIAGNOSIS — G47.33 MILD OBSTRUCTIVE SLEEP APNEA: ICD-10-CM

## 2024-04-09 DIAGNOSIS — D83.9 CVID (COMMON VARIABLE IMMUNODEFICIENCY) (H): ICD-10-CM

## 2024-04-09 DIAGNOSIS — Z01.818 PRE-OP EXAM: Primary | ICD-10-CM

## 2024-04-09 DIAGNOSIS — H25.9 SENILE CATARACT OF RIGHT EYE, UNSPECIFIED AGE-RELATED CATARACT TYPE: ICD-10-CM

## 2024-04-09 PROCEDURE — 99214 OFFICE O/P EST MOD 30 MIN: CPT

## 2024-04-09 RX ORDER — OMEGA-3 FATTY ACIDS/FISH OIL 300-1000MG
1280 CAPSULE ORAL DAILY
COMMUNITY

## 2024-04-09 ASSESSMENT — PAIN SCALES - GENERAL: PAINLEVEL: NO PAIN (0)

## 2024-04-09 NOTE — PROGRESS NOTES
Preoperative Evaluation  [facility]  [address]  [address]  [facility] [address]  Phone: [phone]  Primary Provider: GRISELDA Duque [facility]  Pre-op Performing Provider: KATIE XAVIER  Apr 9, 2024       Tricia is a 83 year old, presenting for the following:  Pre-Op Exam (Piggy back for cataracts)        4/9/2024     1:24 PM   Additional Questions   Roomed by Bushra Adams   Accompanied by self         4/9/2024     1:24 PM   Patient Reported Additional Medications   Patient reports taking the following new medications omega 3     Surgical Information  Surgery/Procedure: Piggy back cataract Rt eye  Surgery Location: [facility]  Surgeon: Aaliyah  Surgery Date: 4/18/24  Time of Surgery: 1 PM  Where patient plans to recover: At home with family  Fax number for surgical facility: [phone]    Assessment & Plan     The proposed surgical procedure is considered LOW risk.    (Z01.818) Pre-op exam  (primary encounter diagnosis)  Comment: pt presents for pre op exam today. 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 She follows with oncology at the [facility]. Also has history of autoimmune hemolytic anemia and IgA deficiency.   She receives rituxan every 2 months and IVIG every 8 weeks if needed.  She feels well at this time and endorses her CVID being well controlled at this time.  Plan:     (E03.9) Acquired hypothyroidism  Comment: Stable on Synthroid 150MCG daily  Plan:     (G47.33) Mild obstructive sleep apnea  Comment: mild MARIE- she does not use CPAP   Plan:          - No identified additional risk factors other than previously addressed    Antiplatelet or Anticoagulation Medication Instructions   - Patient is on no  antiplatelet or anticoagulation medications.    Additional Medication Instructions  Patient is to take all scheduled medications on the day of surgery    Recommendation  APPROVAL GIVEN to proceed with proposed procedure, without further diagnostic evaluation.            Subjective       HPI related to upcoming procedure: Pt presents for pre operative exam.         4/2/2024    10:28 AM   Preop Questions   1. Have you ever had a heart attack or stroke? No   2. Have you ever had surgery on your heart or blood vessels, such as a stent placement, a coronary artery bypass, or surgery on an artery in your head, neck, heart, or legs? No   3. Do you have chest pain with activity? No   4. Do you have a history of  heart failure? No   5. Do you currently have a cold, bronchitis or symptoms of other infection? No   6. Do you have a cough, shortness of breath, or wheezing? No   7. Do you or anyone in your family have previous history of blood clots? No   8. Do you or does anyone in your family have a serious bleeding problem such as prolonged bleeding following surgeries or cuts? No   9. Have you ever had problems with anemia or been told to take iron pills? YES - history of hemolytic anemia. Hgb currently stable at 11.1    10. Have you had any abnormal blood loss such as black, tarry or bloody stools, or abnormal vaginal bleeding? No   11. Have you ever had a blood transfusion? No   12. Are you willing to have a blood transfusion if it is medically needed before, during, or after your surgery? Yes   13. Have you or any of your relatives ever had problems with anesthesia? No   14. Do you have sleep apnea, excessive snoring or daytime drowsiness? YES - but does not use CPAP machine    14a. Do you have a CPAP machine? Yes   15. Do you have any artifical heart valves or other implanted medical devices like a pacemaker, defibrillator, or continuous glucose monitor? No   16. Do you have artificial joints? No   17. Are you allergic to  latex? No       Health Care Directive  Patient does not have a Health Care Directive or Living Will: Discussed advance care planning with patient; however, patient declined at this time.    Preoperative Review of    reviewed - no record of controlled substances prescribed.          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      CARDIOVASCULAR SCREENING; LDL GOAL LESS THAN 130 08/03/2015     Priority: Medium    Autoimmune hemolytic anemia (H) 07/31/2015     Priority: Medium    CVID (common variable immunodeficiency) (H) 04/10/2015     Priority: Medium     Was seen at [place]-- Dr. Estevez.        Ulcerative colitis (H) 03/29/2014     Priority: Medium     Ulcerative Colitis, Unspecified  Ulcerative colitis, unspecified      Bronchiectasis (H) 04/16/2009     Priority: Medium    Lymphocytic colitis 09/27/2001     Priority: Medium     Problem list name updated by automated process. 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    Hemorrhage Subdural Trauma Without LOC Active    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, and posterior vitreous detachment    Urinary tract infection, site not specified     Recurrent UTI's     Past Surgical History:   Procedure Laterality Date    BIOPSY MUSCLE DIAGNOSTIC (LOCATION) Right 1/16/2024    Procedure: BIOPSY, MUSCLE - RIGHT THIGH;  Surgeon: Dickson Madrid MD;  Location: [facility] OR    COLONOSCOPY N/A 4/26/2016    Procedure: COLONOSCOPY;  Surgeon: Dontae Del Rio MD;  Location:  GI    COLONOSCOPY N/A 2/22/2024    Procedure: Colonoscopy with polypectomy by using cold snare and two hemoclips applied;  Surgeon: Dontae Del Rio MD;  Location:  GI    ENT SURGERY  1985    Thyroid lobectomy    ESOPHAGOSCOPY, GASTROSCOPY, DUODENOSCOPY (EGD), COMBINED N/A 2/22/2024    Procedure: ESOPHAGOGASTRODUODENOSCOPY, WITH BIOPSY by using cold forcep;  Surgeon: Dontae Del Rio MD;  Location:  GI    EYE SURGERY Bilateral 04/20/2021    Cataract Surgery    HC CYSTOSCOPY,INSERT URETERAL STENT      Ureteral stent insertion    HC FLEX SIGMOIDOSCOPY W BIOPSY  5/30/00     LAPAROSCOPY, SURGICAL; CHOLECYSTECTOMY  1992     THYROIDECTOMY      LIGATION OF HEMORRHOID(S)      Hemorrhoidectomy    UPPER GI ENDOSCOPY,BIOPSY  10/01/01    [facility] LIGATE FALLOPIAN TUBE      [facility] COLONOSCOPY W BIOPSY  4/26/00    ZZ COLONOSCOPY W BIOPSY  10/8/03    ZZ COLONOSCOPY W BIOPSY  6/27/05    REPEAT IN 5 YEARS     Current Outpatient Medications   Medication Sig Dispense Refill    cyanocobalamin (VITAMIN  B-12) 1000 MCG tablet   3    folic acid (FOLVITE) 1 MG tablet   3    ketoconazole (NIZORAL) 2 % external shampoo Use every other day to scalp as needed 120 mL 0    levothyroxine (SYNTHROID/LEVOTHROID) 150 MCG tablet Take one tablet  by mouth daily 90 tablet 2    omega 3 1000 MG  "CAPS Take 1,280 mg by mouth daily      triamcinolone (KENALOG) 0.1 % external ointment Apply topically 2 times daily         Allergies   Allergen Reactions    Doxycycline         Social History     Tobacco Use    Smoking status: Never    Smokeless tobacco: Never   Substance Use Topics    Alcohol use: Not Currently     Comment: 1 a day at most       History   Drug Use No       Review of Systems  CONSTITUTIONAL: NEGATIVE for fever, chills, change in weight  RESP: NEGATIVE for significant cough or SOB  CV: NEGATIVE for chest pain, palpitations or peripheral edema      Objective    /64 (BP Location: Left arm, Patient Position: Sitting, Cuff Size: Adult Regular)   Pulse 95   Temp 98.1  F (36.7  C) (Tympanic)   Resp 20   Ht 1.81 m (5' 11.25\")   Wt 67.5 kg (148 lb 14.4 oz)   LMP  (LMP Unknown)   SpO2 97%   BMI 20.62 kg/m     Estimated body mass index is 20.62 kg/m  as calculated from the following:    Height as of this encounter: 1.81 m (5' 11.25\").    Weight as of this encounter: 67.5 kg (148 lb 14.4 oz).      Physical Exam  Constitutional:       General: She is not in acute distress.     Appearance: Normal appearance. She is not ill-appearing, toxic-appearing or diaphoretic.   HENT:      Head: Normocephalic and atraumatic.   Eyes:      Conjunctiva/sclera: Conjunctivae normal.   Cardiovascular:      Rate and Rhythm: Normal rate and regular rhythm.      Heart sounds: Normal heart sounds.   Pulmonary:      Effort: Pulmonary effort is normal.      Breath sounds: Normal breath sounds.   Skin:     General: Skin is warm and dry.   Neurological:      Mental Status: She is alert and oriented to person, place, and time.   Psychiatric:         Mood and Affect: Mood normal.         Behavior: Behavior normal.         Thought Content: Thought content normal.         Judgment: Judgment normal.       Recent Labs   Lab Test 04/03/24  1241 03/05/24  1105   HGB 11.1* 11.5*   * 134*    141   POTASSIUM 4.2 4.2   CR " 0.59 0.62        Diagnostics  No labs were ordered during this visit.   No EKG required for low risk surgery (cataract, skin procedure, breast biopsy, etc).    Revised Cardiac Risk Index (RCRI)  The patient has the following serious cardiovascular risks for perioperative complications:   - No serious cardiac risks = 0 points     RCRI Interpretation: 0 points: Class I (very low risk - 0.4% complication rate)         Signed Electronically by: SHANIQUE Almeida CNP  Copy of this evaluation report is provided to requesting physician.

## 2024-04-15 ENCOUNTER — LAB (OUTPATIENT)
Dept: LAB | Facility: CLINIC | Age: 84
End: 2024-04-15
Payer: MEDICARE

## 2024-04-15 DIAGNOSIS — M62.81 MUSCLE WEAKNESS (GENERALIZED): Primary | ICD-10-CM

## 2024-04-15 LAB
CRP SERPL-MCNC: <3 MG/L
ERYTHROCYTE [SEDIMENTATION RATE] IN BLOOD BY WESTERGREN METHOD: 18 MM/HR (ref 0–30)

## 2024-04-15 PROCEDURE — 86235 NUCLEAR ANTIGEN ANTIBODY: CPT

## 2024-04-15 PROCEDURE — 86038 ANTINUCLEAR ANTIBODIES: CPT

## 2024-04-15 PROCEDURE — 82784 ASSAY IGA/IGD/IGG/IGM EACH: CPT

## 2024-04-15 PROCEDURE — 83516 IMMUNOASSAY NONANTIBODY: CPT | Mod: XU

## 2024-04-15 PROCEDURE — 86431 RHEUMATOID FACTOR QUANT: CPT

## 2024-04-15 PROCEDURE — 83516 IMMUNOASSAY NONANTIBODY: CPT

## 2024-04-15 PROCEDURE — 85652 RBC SED RATE AUTOMATED: CPT

## 2024-04-15 PROCEDURE — 86140 C-REACTIVE PROTEIN: CPT

## 2024-04-15 PROCEDURE — 86200 CCP ANTIBODY: CPT

## 2024-04-15 PROCEDURE — 83519 RIA NONANTIBODY: CPT

## 2024-04-15 PROCEDURE — 36415 COLL VENOUS BLD VENIPUNCTURE: CPT

## 2024-04-16 LAB
ANA SER QL IF: NEGATIVE
IGA SERPL-MCNC: <2 MG/DL (ref 84–499)
IGG SERPL-MCNC: 491 MG/DL (ref 610–1616)
RHEUMATOID FACT SERPL-ACNC: <10 IU/ML

## 2024-04-17 LAB
ACHR BLOCK AB/ACHR TOTAL SFR SER: 10 %
MAG IGM SER IA-ACNC: <1000 TU
SGPG IGM SER-ACNC: 0 IV

## 2024-04-18 LAB
ACHR MOD AB/ACHR TOTAL SFR SER: 0 %
CCP AB SER IA-ACNC: 1.3 U/ML
ENA SS-A AB SER IA-ACNC: 0.9 U/ML
ENA SS-A AB SER IA-ACNC: NEGATIVE
ENA SS-B IGG SER IA-ACNC: <0.6 U/ML
ENA SS-B IGG SER IA-ACNC: NEGATIVE

## 2024-04-19 LAB
ACHR BIND AB SER-SCNC: 0 NMOL/L
GM1 GANGL IGG SER IA-ACNC: 2 IV
GM1 GANGL IGM SER IA-ACNC: 2 IV

## 2024-05-01 ENCOUNTER — INFUSION THERAPY VISIT (OUTPATIENT)
Dept: INFUSION THERAPY | Facility: CLINIC | Age: 84
End: 2024-05-01
Attending: PHYSICIAN ASSISTANT
Payer: MEDICARE

## 2024-05-01 VITALS
DIASTOLIC BLOOD PRESSURE: 55 MMHG | WEIGHT: 146 LBS | BODY MASS INDEX: 20.22 KG/M2 | OXYGEN SATURATION: 95 % | HEART RATE: 76 BPM | RESPIRATION RATE: 18 BRPM | TEMPERATURE: 98.3 F | SYSTOLIC BLOOD PRESSURE: 121 MMHG

## 2024-05-01 DIAGNOSIS — D80.3 SELECTIVE DEFICIENCY OF IGG SUBCLASSES (H): ICD-10-CM

## 2024-05-01 DIAGNOSIS — D83.9 CVID (COMMON VARIABLE IMMUNODEFICIENCY) (H): Primary | ICD-10-CM

## 2024-05-01 DIAGNOSIS — D59.10 AUTOIMMUNE HEMOLYTIC ANEMIA (H): ICD-10-CM

## 2024-05-01 DIAGNOSIS — D59.19 OTHER AUTOIMMUNE HEMOLYTIC ANEMIA (H): ICD-10-CM

## 2024-05-01 LAB
ALBUMIN SERPL BCG-MCNC: 4.2 G/DL (ref 3.5–5.2)
ALP SERPL-CCNC: 110 U/L (ref 40–150)
ALT SERPL W P-5'-P-CCNC: 14 U/L (ref 0–50)
ANION GAP SERPL CALCULATED.3IONS-SCNC: 11 MMOL/L (ref 7–15)
AST SERPL W P-5'-P-CCNC: 27 U/L (ref 0–45)
BASOPHILS # BLD AUTO: 0 10E3/UL (ref 0–0.2)
BASOPHILS NFR BLD AUTO: 1 %
BILIRUB SERPL-MCNC: 0.6 MG/DL
BUN SERPL-MCNC: 12.8 MG/DL (ref 8–23)
CALCIUM SERPL-MCNC: 8.3 MG/DL (ref 8.8–10.2)
CHLORIDE SERPL-SCNC: 104 MMOL/L (ref 98–107)
CREAT SERPL-MCNC: 0.62 MG/DL (ref 0.51–0.95)
DEPRECATED HCO3 PLAS-SCNC: 25 MMOL/L (ref 22–29)
EGFRCR SERPLBLD CKD-EPI 2021: 88 ML/MIN/1.73M2
EOSINOPHIL # BLD AUTO: 0 10E3/UL (ref 0–0.7)
EOSINOPHIL NFR BLD AUTO: 0 %
ERYTHROCYTE [DISTWIDTH] IN BLOOD BY AUTOMATED COUNT: 14.2 % (ref 10–15)
GLUCOSE SERPL-MCNC: 111 MG/DL (ref 70–99)
HCT VFR BLD AUTO: 32 % (ref 35–47)
HGB BLD-MCNC: 10.9 G/DL (ref 11.7–15.7)
IMM GRANULOCYTES # BLD: 0 10E3/UL
IMM GRANULOCYTES NFR BLD: 1 %
LDH SERPL L TO P-CCNC: 195 U/L (ref 0–250)
LYMPHOCYTES # BLD AUTO: 1.9 10E3/UL (ref 0.8–5.3)
LYMPHOCYTES NFR BLD AUTO: 44 %
MCH RBC QN AUTO: 36.3 PG (ref 26.5–33)
MCHC RBC AUTO-ENTMCNC: 34.1 G/DL (ref 31.5–36.5)
MCV RBC AUTO: 107 FL (ref 78–100)
MONOCYTES # BLD AUTO: 0.4 10E3/UL (ref 0–1.3)
MONOCYTES NFR BLD AUTO: 9 %
NEUTROPHILS # BLD AUTO: 1.9 10E3/UL (ref 1.6–8.3)
NEUTROPHILS NFR BLD AUTO: 45 %
NRBC # BLD AUTO: 0 10E3/UL
NRBC BLD AUTO-RTO: 0 /100
PLATELET # BLD AUTO: 127 10E3/UL (ref 150–450)
POTASSIUM SERPL-SCNC: 4 MMOL/L (ref 3.4–5.3)
PROT SERPL-MCNC: 5.6 G/DL (ref 6.4–8.3)
RBC # BLD AUTO: 3 10E6/UL (ref 3.8–5.2)
RETICS # AUTO: 0.06 10E6/UL (ref 0.03–0.1)
RETICS/RBC NFR AUTO: 2 % (ref 0.5–2)
SODIUM SERPL-SCNC: 140 MMOL/L (ref 135–145)
WBC # BLD AUTO: 4.1 10E3/UL (ref 4–11)

## 2024-05-01 PROCEDURE — 85045 AUTOMATED RETICULOCYTE COUNT: CPT | Performed by: INTERNAL MEDICINE

## 2024-05-01 PROCEDURE — 83010 ASSAY OF HAPTOGLOBIN QUANT: CPT | Performed by: INTERNAL MEDICINE

## 2024-05-01 PROCEDURE — 96365 THER/PROPH/DIAG IV INF INIT: CPT

## 2024-05-01 PROCEDURE — 80053 COMPREHEN METABOLIC PANEL: CPT | Performed by: INTERNAL MEDICINE

## 2024-05-01 PROCEDURE — 36415 COLL VENOUS BLD VENIPUNCTURE: CPT

## 2024-05-01 PROCEDURE — 83615 LACTATE (LD) (LDH) ENZYME: CPT | Performed by: INTERNAL MEDICINE

## 2024-05-01 PROCEDURE — 82784 ASSAY IGA/IGD/IGG/IGM EACH: CPT | Performed by: INTERNAL MEDICINE

## 2024-05-01 PROCEDURE — 85025 COMPLETE CBC W/AUTO DIFF WBC: CPT | Performed by: INTERNAL MEDICINE

## 2024-05-01 PROCEDURE — 96366 THER/PROPH/DIAG IV INF ADDON: CPT

## 2024-05-01 PROCEDURE — 250N000011 HC RX IP 250 OP 636: Performed by: INTERNAL MEDICINE

## 2024-05-01 PROCEDURE — 96375 TX/PRO/DX INJ NEW DRUG ADDON: CPT

## 2024-05-01 RX ORDER — HEPARIN SODIUM (PORCINE) LOCK FLUSH IV SOLN 100 UNIT/ML 100 UNIT/ML
5 SOLUTION INTRAVENOUS
Status: CANCELLED | OUTPATIENT
Start: 2024-06-25

## 2024-05-01 RX ORDER — NALOXONE HYDROCHLORIDE 0.4 MG/ML
0.2 INJECTION, SOLUTION INTRAMUSCULAR; INTRAVENOUS; SUBCUTANEOUS
Status: CANCELLED | OUTPATIENT
Start: 2024-06-25

## 2024-05-01 RX ORDER — ALBUTEROL SULFATE 0.83 MG/ML
2.5 SOLUTION RESPIRATORY (INHALATION)
Status: CANCELLED | OUTPATIENT
Start: 2024-06-25

## 2024-05-01 RX ORDER — PREDNISOLONE ACETATE 10 MG/ML
1 SUSPENSION/ DROPS OPHTHALMIC 2 TIMES DAILY
COMMUNITY
Start: 2024-02-29 | End: 2024-07-22

## 2024-05-01 RX ORDER — KETOROLAC TROMETHAMINE 5 MG/ML
1 SOLUTION OPHTHALMIC DAILY
COMMUNITY
Start: 2024-02-29 | End: 2024-07-22

## 2024-05-01 RX ORDER — MOXIFLOXACIN 5 MG/ML
1 SOLUTION/ DROPS OPHTHALMIC 2 TIMES DAILY
COMMUNITY
Start: 2024-02-29 | End: 2024-05-14

## 2024-05-01 RX ORDER — ALBUTEROL SULFATE 90 UG/1
1-2 AEROSOL, METERED RESPIRATORY (INHALATION)
Status: CANCELLED
Start: 2024-06-25

## 2024-05-01 RX ORDER — METHYLPREDNISOLONE SODIUM SUCCINATE 125 MG/2ML
125 INJECTION, POWDER, LYOPHILIZED, FOR SOLUTION INTRAMUSCULAR; INTRAVENOUS
Status: CANCELLED
Start: 2024-06-25

## 2024-05-01 RX ORDER — MEPERIDINE HYDROCHLORIDE 25 MG/ML
25 INJECTION INTRAMUSCULAR; INTRAVENOUS; SUBCUTANEOUS EVERY 30 MIN PRN
Status: CANCELLED | OUTPATIENT
Start: 2024-06-25

## 2024-05-01 RX ORDER — METHYLPREDNISOLONE SODIUM SUCCINATE 40 MG/ML
20 INJECTION, POWDER, LYOPHILIZED, FOR SOLUTION INTRAMUSCULAR; INTRAVENOUS ONCE
Status: COMPLETED | OUTPATIENT
Start: 2024-05-01 | End: 2024-05-01

## 2024-05-01 RX ORDER — EPINEPHRINE 1 MG/ML
0.3 INJECTION, SOLUTION INTRAMUSCULAR; SUBCUTANEOUS EVERY 5 MIN PRN
Status: CANCELLED | OUTPATIENT
Start: 2024-06-25

## 2024-05-01 RX ORDER — HEPARIN SODIUM,PORCINE 10 UNIT/ML
5 VIAL (ML) INTRAVENOUS
Status: CANCELLED | OUTPATIENT
Start: 2024-06-25

## 2024-05-01 RX ORDER — DIPHENHYDRAMINE HYDROCHLORIDE 50 MG/ML
50 INJECTION INTRAMUSCULAR; INTRAVENOUS
Status: CANCELLED
Start: 2024-06-25

## 2024-05-01 RX ORDER — METHYLPREDNISOLONE SODIUM SUCCINATE 40 MG/ML
20 INJECTION, POWDER, LYOPHILIZED, FOR SOLUTION INTRAMUSCULAR; INTRAVENOUS ONCE
Status: CANCELLED
Start: 2024-06-25 | End: 2024-06-25

## 2024-05-01 RX ADMIN — HUMAN IMMUNOGLOBULIN G 35 G: 20 LIQUID INTRAVENOUS at 13:09

## 2024-05-01 RX ADMIN — METHYLPREDNISOLONE SODIUM SUCCINATE 20 MG: 40 INJECTION, POWDER, FOR SOLUTION INTRAMUSCULAR; INTRAVENOUS at 12:59

## 2024-05-01 NOTE — PROGRESS NOTES
dInfusion Nursing Note:  Tricia Sosa presents today for Labs + IVIG.    Patient seen by provider today: No   present during visit today: Not Applicable.    Note: Tricia reports she is feeling well today.  She denies any new or concerning symptoms.    Pre-medicated with Solu-Medrol.  IVIG infusion initiated at 0.5 ml/kg/hr and increased by 0.5 ml/kg/hr every 15 minutes to max rate of 4 ml/kg/hr.    Intravenous Access:  Labs drawn without difficulty.  Peripheral IV placed.    Treatment Conditions:   04/15/24 13:33    (L)     She denies any new symptoms, reactions, fever/elevated temperature, illness, recent major or local infection, being on antibiotics, or productive cough.  She reports recent cataract surgery on 4/18/2024.    Arnulfo Griffin NP notified about recent cataract surgery.    5/1/2024 1239  TORB Arnulfo Griffin NP/Hayes Hernandez RN:  OK to proceed.    Post Infusion Assessment:  Patient tolerated infusion without incident.  Blood return noted pre and post infusion.  Site patent and intact, free from redness, edema or discomfort.  No evidence of extravasations.  Access discontinued per protocol.    Discharge Plan:   Discharge instructions reviewed with: Patient.  Patient and/or family verbalized understanding of discharge instructions and all questions answered.  AVS to patient via [employer].  Patient will return 5/28 for next appointment.   Patient discharged in stable condition accompanied by: self.  Departure Mode: Ambulatory.      Hayes Hernandez RN

## 2024-05-02 LAB
HAPTOGLOB SERPL-MCNC: <10 MG/DL (ref 30–200)
IGA SERPL-MCNC: <2 MG/DL (ref 84–499)
IGG SERPL-MCNC: 408 MG/DL (ref 610–1616)
IGM SERPL-MCNC: <10 MG/DL (ref 35–242)

## 2024-05-07 ENCOUNTER — TELEPHONE (OUTPATIENT)
Dept: INTERNAL MEDICINE | Facility: CLINIC | Age: 84
End: 2024-05-07
Payer: MEDICARE

## 2024-05-07 DIAGNOSIS — D59.10 AUTOIMMUNE HEMOLYTIC ANEMIA (H): ICD-10-CM

## 2024-05-07 DIAGNOSIS — E03.9 ACQUIRED HYPOTHYROIDISM: ICD-10-CM

## 2024-05-07 DIAGNOSIS — D83.9 CVID (COMMON VARIABLE IMMUNODEFICIENCY) (H): Primary | ICD-10-CM

## 2024-05-09 NOTE — TELEPHONE ENCOUNTER
Refill request for Levothyroxine.   Pt needs to establish with new PCP.     TSH   Date Value Ref Range Status   04/18/2023 4.15 0.30 - 4.20 uIU/mL Final   03/17/2022 0.71 0.40 - 4.00 mU/L Final   06/22/2021 0.46 0.40 - 4.00 mU/L Final      Last OV on 4/9/24 for Pre-op

## 2024-05-13 ENCOUNTER — NURSE TRIAGE (OUTPATIENT)
Dept: INTERNAL MEDICINE | Facility: CLINIC | Age: 84
End: 2024-05-13
Payer: MEDICARE

## 2024-05-13 RX ORDER — LEVOTHYROXINE SODIUM 150 UG/1
TABLET ORAL
Qty: 90 TABLET | Refills: 0 | Status: SHIPPED | OUTPATIENT
Start: 2024-05-13 | End: 2024-08-06

## 2024-05-13 NOTE — TELEPHONE ENCOUNTER
Nurse Triage SBAR    Is this a 2nd Level Triage? YES, LICENSED   PRACTITIONER REVIEW IS REQUIRED    Situation   Patient calls for an appointment due to worsening back pain after a fall on 5/8 last.     Assessment    Sitting on stool outside and leaned forward on a slanted drive way.   The stool slid out and landed on her butt and must off hurts her back on the stool.   Back hurt mild pain that day and worsened since.   Denied losing consciousness  or hitting head     Location of back pain:   Left side mid way up.   Between side and spine.   No tender to touch     The patient states she gets a shot of moderate to severe pain when trying to get out of a chair or when taking a deep breathe     Pain was only present when moving until  the last couple nights the pain is a constant moderate pain   No swelling or bruising   No hip  or leg pain on the left side     Patient denied pain down legs or weakness  Denied numbness or tingling in right and left groin, leg foot and toes     Denied blood in urine   Denied abdominal pain     Denied fevers  Denied shortness of breath and chest pain     Started ibuprofen 5/9 at night, 1-2 tablets each night   Has not needed ibuprofen during the day   Routed to provider    Recommendations  Explained triage disposition recommendations .   Patient declined to be seen today   Patient would prefer to be seen tomorrow.   Needs to arrange a ride  Only same day open tomorrow, writer informed patient [facility] staff would pato back to assist in scheduling     Advised only ibuprofen and Tylenol take as directed with food.   Can take Tylenol as directed.   Keep tack of when take any medication time and dose   Ice 30 minutes 3--5 times a day.     Reviewed another option is urgent care - explained locations and hours.   Advised worsening urgent care or er   Review red flag symptoms     Reason for Disposition   Landed hard on feet or buttocks and pain over spine     Left buttock and her back fell against  the stool   SEVERE pain in kidney area (flank) that follows a direct blow to that site   SEVERE back pain (e.g., excruciating, unable to do any normal activities) and not improved after pain medicine and CARE ADVICE     Severe pain when trying to get out of a chair or when taking a deep breathe    Additional Information   Negative: Dangerous mechanism of injury (e.g., MVA, contact sports, trampoline, diving, fall > 10 feet or 3 meters)  (Exception: Back pain began > 1 hour after injury.)   Negative: Weakness (i.e., paralysis, loss of muscle strength) of the leg(s) or foot and sudden onset after back injury   Negative: Numbness (i.e., loss of sensation) of the leg(s) or foot and sudden onset after back injury   Negative: Major bleeding (actively dripping or spurting) that can't be stopped   Negative: Bullet, knife or other serious penetrating wound   Negative: Shock suspected (e.g., cold/pale/clammy skin, too weak to stand, low BP, rapid pulse)   Negative: Sounds like a life-threatening emergency to the triager   Negative: Back pain from overuse (work, exercise, gardening) OR from twisting, lifting, or bending injury   Negative: Back pain not from an injury   Negative: Blood in urine (red, pink, or tea-colored)   Negative: Unable to urinate (or only a few drops) > 4 hours and bladder feels very full (e.g., palpable bladder or strong urge to urinate)   Negative: Loss of bladder or bowel control (urine or bowel incontinence; wetting self, leaking stool) of new-onset   Negative: Numbness (loss of sensation) in groin or rectal area   Negative: Skin is split open or gaping (length > 1/2 inch or 12 mm)   Negative: Puncture wound of back   Negative: Bleeding won't stop after 10 minutes of direct pressure (using correct technique)   Negative: Sounds like a serious injury to the triager   Negative: Weakness of a leg or foot (e.g., unable to bear weight, dragging foot)   Negative: Numbness of a leg or foot (i.e., loss of  sensation)   Negative: Pain radiates into the thigh or further down the leg now   Negative: Large swelling or bruise and size > palm of person's hand    Protocols used: Back Injury-A-[place]

## 2024-05-13 NOTE — TELEPHONE ENCOUNTER
FYI - Status Update    Who is Calling: patient    Update: Patient awaiting response regarding levothyroxine request    Does caller want a call/response back: Yes     Could we send this information to you in [employer] or would you prefer to receive a phone call?:   No preference   Okay to leave a detailed message?: Yes at Home number on file [phone] (home)

## 2024-05-13 NOTE — TELEPHONE ENCOUNTER
Pt needs to establish with new PCP. I saw her in April for a pre-op. She needs TSH updated as well. I sent 90 days of refills- no further refills will be given after that

## 2024-05-13 NOTE — TELEPHONE ENCOUNTER
Call to patient to reiterate considering urgent care if pain worsens or to contact clinic if symptoms worsen today.    Appointment scheduled for tomorrow.    May 14, 2024 10:00 AM  (Arrive by 9:40 AM)  Provider Visit with SHANIQUE Aldana CNP  [facility] ([facility] ) [phone]       Thank you,  Luis Hoff, Triage RN [facility]  9:53 AM 5/13/2024

## 2024-05-14 ENCOUNTER — MYC MEDICAL ADVICE (OUTPATIENT)
Dept: INTERNAL MEDICINE | Facility: CLINIC | Age: 84
End: 2024-05-14

## 2024-05-14 ENCOUNTER — ANCILLARY PROCEDURE (OUTPATIENT)
Dept: GENERAL RADIOLOGY | Facility: CLINIC | Age: 84
End: 2024-05-14
Payer: MEDICARE

## 2024-05-14 ENCOUNTER — VIRTUAL VISIT (OUTPATIENT)
Dept: ONCOLOGY | Facility: CLINIC | Age: 84
End: 2024-05-14
Attending: STUDENT IN AN ORGANIZED HEALTH CARE EDUCATION/TRAINING PROGRAM
Payer: MEDICARE

## 2024-05-14 ENCOUNTER — OFFICE VISIT (OUTPATIENT)
Dept: INTERNAL MEDICINE | Facility: CLINIC | Age: 84
End: 2024-05-14
Payer: MEDICARE

## 2024-05-14 VITALS
RESPIRATION RATE: 18 BRPM | TEMPERATURE: 97.6 F | WEIGHT: 146.5 LBS | BODY MASS INDEX: 20.51 KG/M2 | OXYGEN SATURATION: 95 % | HEIGHT: 71 IN | SYSTOLIC BLOOD PRESSURE: 119 MMHG | DIASTOLIC BLOOD PRESSURE: 57 MMHG | HEART RATE: 83 BPM

## 2024-05-14 VITALS — HEIGHT: 71 IN | WEIGHT: 146 LBS | BODY MASS INDEX: 20.44 KG/M2

## 2024-05-14 DIAGNOSIS — R07.81 RIB PAIN ON LEFT SIDE: ICD-10-CM

## 2024-05-14 DIAGNOSIS — M62.81 MUSCLE WEAKNESS (GENERALIZED): ICD-10-CM

## 2024-05-14 DIAGNOSIS — D59.10 AUTOIMMUNE HEMOLYTIC ANEMIA (H): Primary | ICD-10-CM

## 2024-05-14 DIAGNOSIS — M54.6 ACUTE LEFT-SIDED THORACIC BACK PAIN: ICD-10-CM

## 2024-05-14 DIAGNOSIS — Z91.81 HISTORY OF RECENT FALL: Primary | ICD-10-CM

## 2024-05-14 DIAGNOSIS — D47.Z9 OTHER SPECIFIED NEOPLASMS OF UNCERTAIN BEHAVIOR OF LYMPHOID, HEMATOPOIETIC AND RELATED TISSUE (H): ICD-10-CM

## 2024-05-14 DIAGNOSIS — R26.89 BALANCE PROBLEMS: ICD-10-CM

## 2024-05-14 PROBLEM — M51.369 DDD (DEGENERATIVE DISC DISEASE), LUMBAR: Status: ACTIVE | Noted: 2019-08-05

## 2024-05-14 LAB
BASOPHILS # BLD AUTO: 0 10E3/UL (ref 0–0.2)
BASOPHILS NFR BLD AUTO: 0 %
EOSINOPHIL # BLD AUTO: 0 10E3/UL (ref 0–0.7)
EOSINOPHIL NFR BLD AUTO: 0 %
ERYTHROCYTE [DISTWIDTH] IN BLOOD BY AUTOMATED COUNT: 14 % (ref 10–15)
HCT VFR BLD AUTO: 34.1 % (ref 35–47)
HGB BLD-MCNC: 11.6 G/DL (ref 11.7–15.7)
IMM GRANULOCYTES # BLD: 0 10E3/UL
IMM GRANULOCYTES NFR BLD: 0 %
LYMPHOCYTES # BLD AUTO: 4.1 10E3/UL (ref 0.8–5.3)
LYMPHOCYTES NFR BLD AUTO: 61 %
MCH RBC QN AUTO: 36 PG (ref 26.5–33)
MCHC RBC AUTO-ENTMCNC: 34 G/DL (ref 31.5–36.5)
MCV RBC AUTO: 106 FL (ref 78–100)
MONOCYTES # BLD AUTO: 0.5 10E3/UL (ref 0–1.3)
MONOCYTES NFR BLD AUTO: 8 %
NEUTROPHILS # BLD AUTO: 2.1 10E3/UL (ref 1.6–8.3)
NEUTROPHILS NFR BLD AUTO: 31 %
NRBC # BLD AUTO: 0 10E3/UL
NRBC BLD AUTO-RTO: 0 /100
PLATELET # BLD AUTO: 125 10E3/UL (ref 150–450)
RBC # BLD AUTO: 3.22 10E6/UL (ref 3.8–5.2)
WBC # BLD AUTO: 6.7 10E3/UL (ref 4–11)

## 2024-05-14 PROCEDURE — 71046 X-RAY EXAM CHEST 2 VIEWS: CPT | Mod: TC | Performed by: RADIOLOGY

## 2024-05-14 PROCEDURE — 72072 X-RAY EXAM THORAC SPINE 3VWS: CPT | Mod: TC | Performed by: RADIOLOGY

## 2024-05-14 PROCEDURE — 99215 OFFICE O/P EST HI 40 MIN: CPT | Mod: 95 | Performed by: STUDENT IN AN ORGANIZED HEALTH CARE EDUCATION/TRAINING PROGRAM

## 2024-05-14 PROCEDURE — 36415 COLL VENOUS BLD VENIPUNCTURE: CPT

## 2024-05-14 PROCEDURE — 85025 COMPLETE CBC W/AUTO DIFF WBC: CPT

## 2024-05-14 PROCEDURE — 99214 OFFICE O/P EST MOD 30 MIN: CPT

## 2024-05-14 RX ORDER — BACLOFEN 10 MG/1
10 TABLET ORAL 3 TIMES DAILY
Qty: 30 TABLET | Refills: 0 | Status: SHIPPED | OUTPATIENT
Start: 2024-05-14 | End: 2024-05-24

## 2024-05-14 ASSESSMENT — PAIN SCALES - GENERAL: PAINLEVEL: MODERATE PAIN (5)

## 2024-05-14 NOTE — LETTER
5/14/2024         RE: Tricia Sosa  [address]  [place] [address]        Dear Colleague,    Thank you for referring your patient, Tricia Sosa, to the [facility]. Please see a copy of my visit note below.    Virtual Visit Details    Type of service:  Video Visit     Originating Location (pt. Location): Home    Distant Location (provider location):  On-site  Platform used for Video Visit: [facility]   PM&R clinic note        Interval history:     Tricia Sosa presents to clinic today for follow up reg her rehab needs.   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   Was last seen in clinic on 11/14/23.  Recommendations included:  Therapy/equipment/braces:  Outpatient physical therapy referral placed for strength and balance.  Start 1-2 protein shakes daily.  Can try either Premier protein shakes or [employer]lé fair life core power shakes.  Aiming for an extra 30 to 60 g of protein daily.  Obtain soft back brace as planned for support with activities.  Referral / follow up with other providers:  Follow-up with neurology as planned for further evaluation and management of balance difficulties.  Follow up: 6 to 8 months.        Symptoms,  Tricia was seen for a return virtual visit today.  She states that with weakness she has not improved. She just fell and hurt the side of her back, and went in today and had an xray which did not demonstrate a rib fracture. It is the left side of her back which got hurt. She was sitting on a stool and fell.   She had an MRI of the pancreas which she will have a follow up for.  Her knee symptoms are about the same, and she has not had a buckling in a while.   Her appetite has been ok, and she has had no issue. She feels like she could use more protein daily. She has been mixing in whey protein daily.   She has not had any falls in the last month or two  "months. Her last fall prior to this most recent one was in December when she was walking down the steps. She walked down two steps and fell really hard, and didn't injure herself at all with that fall.  She did have PT sessions after our last visit and has not seen them for a while, but had continued her home exercise regimen.      Therapies/HEP,  Not currently participating in physical therapy.  Was doing a home exercise regimen, planning to restart.      Functionally,   Still continuing to struggle with generalized weakness, 2 falls since her last visit.      Social history is unchanged.      Medications:  Current Outpatient Medications   Medication Sig Dispense Refill     baclofen (LIORESAL) 10 MG tablet Take 1 tablet (10 mg) by mouth 3 times daily for 10 days 30 tablet 0     cyanocobalamin (VITAMIN  B-12) 1000 MCG tablet   3     folic acid (FOLVITE) 1 MG tablet   3     ketoconazole (NIZORAL) 2 % external shampoo Use every other day to scalp as needed 120 mL 0     ketorolac (ACULAR) 0.5 % ophthalmic solution Place 1 drop into the right eye daily       levothyroxine (SYNTHROID/LEVOTHROID) 150 MCG tablet Take one tablet  by mouth daily 90 tablet 0     omega 3 1000 MG CAPS Take 1,280 mg by mouth daily       prednisoLONE acetate (PRED FORTE) 1 % ophthalmic suspension Place 1 drop into the right eye 2 times daily       triamcinolone (KENALOG) 0.1 % external ointment Apply topically 2 times daily                Physical Exam:   Ht 1.81 m (5' 11.25\")   Wt 66.2 kg (146 lb)   LMP  (LMP Unknown)   BMI 20.22 kg/m    Gen: NAD, pleasant and cooperative   HEENT: Atraumatic, normocephalic, extraocular movements appear intact  Pulm: non-labored breathing in room air  Neuro/MSK:   Orientation: Oriented to person, time, place and situation, Exhibits good insight into her condition and ongoing treatment  Motor: Observed moving upper extremities actively against gravity    Labs/Imaging:  Lab Results   Component Value Date    WBC " "6.7 05/14/2024    HGB 11.6 (L) 05/14/2024    HCT 34.1 (L) 05/14/2024     (H) 05/14/2024     (L) 05/14/2024     Lab Results   Component Value Date     05/01/2024    POTASSIUM 4.0 05/01/2024    CHLORIDE 104 05/01/2024    CO2 25 05/01/2024     (H) 05/01/2024     Lab Results   Component Value Date    GFRESTIMATED 88 05/01/2024    GFRESTBLACK >90 06/29/2021     Lab Results   Component Value Date    AST 27 05/01/2024    ALT 14 05/01/2024    ALKPHOS 110 05/01/2024    BILITOTAL 0.6 05/01/2024    BILIDIRECT 0.30 05/19/2000     No results found for: \"INR\"  Lab Results   Component Value Date    BUN 12.8 05/01/2024    CR 0.62 05/01/2024              Assessment/Plan   Tricia Sosa presents to clinic today for follow up reg her rehab needs.   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   Was last seen in clinic on 11/14/23.  Multiple rehabilitation considerations were discussed with Tricia at today's visit.  She continues to struggle with generalized weakness, and mobility difficulties in the setting of lower extremity weakness.  We discussed a referral to neuro PT to focus on strength, gait and balance.  She is agreeable to this so this referral was placed today.  In addition, continued to encourage her to take in 30 g protein supplementation daily.  We also reviewed recommendations to restart home exercise regimen with gradual introduction of repetitions which can be guided by PT once she starts.  We will plan a return virtual visit in about 6 months.  She is in agreement with this plan.      Therapy/equipment/braces:  Neuro PT referral placed for gait, balance and strength.  Restart home exercise regimen as tolerated.  Continue protein supplementation daily, aiming for an extra 30 g of protein daily.  Follow up: 5 to 6 months.      Marley Heart MD  Physical Medicine & Rehabilitation      50 minutes spent on the date of the encounter doing chart review, " history and exam, documentation and further activities as noted above.               Again, thank you for allowing me to participate in the care of your patient.        Sincerely,        Marley Heart MD

## 2024-05-14 NOTE — PROGRESS NOTES
Assessment & Plan     (Z91.81) History of recent fall  (primary encounter diagnosis)  Comment: Patient presents to the clinic with history of a recent fall where she was sitting on a Stool on a slanted driveway where she leaned forward and stool slipped out behind her and she fell onto her sacrum and onto the stool on her left side.  Clinical picture suggest possible rib fracture or thoracic fracture or a deep tissue bruise. .  Patient's sacrum and pelvic area does not hurt.  Given her low hemoglobin and her fatigue at this time we will recheck her CBC and to get an updated x-ray of her spine and chest.   Plan: CBC with platelets and differential, CANCELED:         XR Lumbar Spine 2/3 Views         Imaging pending.     (M54.6) Acute left-sided thoracic back pain  Comment: Please see above.  Plan: XR Thoracic Spine 3 Views        Imaging pending.    (R07.81) Rib pain on left side  Comment: Given the patient's recent injury and fall will prescribe muscle relaxer at this time to help with pain and help with sleep.  Discussed with the patient that she should not operate a vehicle while taking this medication.  Return to the clinic if she develops shortness of breath or worsening pain.  Patient verbalized understanding.  Plan: XR Chest 2 Views        Baclofen 3 times on day as needed for pain.  Discussed side effects of medication and advised no driving or operating vehicles while on this medication.      30 minutes spent by me on the date of the encounter doing chart review, review of test results, interpretation of tests, patient visit, and documentation             Subjective   Tricia is a 83 year old, presenting for the following health issues:  Patient is being seen to discuss worsening back pain after a fall on 5/8.     Sitting on stool outside and leaned forward on a slanted drive way.   The stool slid out and landed on her butt and must off hurts her back on the stool.   Back hurt mild pain that day and worsened  since.   Denied losing consciousness  or hitting head      Location of back pain:   Left side mid way up.   Between side and spine.   No tender to touch      The patient states she gets a shot of moderate to severe pain when trying to get out of a chair or when taking a deep breathe      Pain was only present when moving until  the last couple nights the pain is a constant moderate pain   No swelling or bruising   No hip  or leg pain on the left side      Patient denied pain down legs or weakness  Denied numbness or tingling in right and left groin, leg foot and toes      Denied blood in urine   Denied abdominal pain      Denied fevers  Denied shortness of breath and chest pain     She has sharp pain on the left mid back from falling on her left side.   She has been having a hard time with sleeping and standing up   She denies SOB.         5/14/2024     9:17 AM   Additional Questions   Roomed by Jessika Gunter     History of Present Illness       Back Pain:  She presents for follow up of back pain. Patient's back pain is a new problem.    Original cause of back pain: a fall  First noticed back pain: in the last week  Patient feels back pain: comes and goesLocation of back pain:  Left middle of back  Description of back pain: dull ache and sharp  Back pain spreads: nowhere    Since patient first noticed back pain, pain is: always present, but gets better and worse  Does back pain interfere with her job:  Not applicable  On a scale of 1-10 (10 being the worst), patient describes pain as:  5  What makes back pain worse: bending, certain positions, lying down, twisting and other   Acupuncture: not tried  Acetaminophen: not tried  Activity or exercise: not tried  Chiropractor:  Not tried  Cold: not tried  Heat: helpful  Massage: not tried  Muscle relaxants: not tried  NSAIDS: helpful  Opioids: not tried  Physical Therapy: not tried  Rest: helpful  Steroid Injection: not tried  Stretching: not tried  Surgery: not tried  TENS  unit: not tried  Other healthcare providers patient is seeing for back pain: None    She eats 2-3 servings of fruits and vegetables daily.She consumes 0 sweetened beverage(s) daily.She exercises with enough effort to increase her heart rate 9 or less minutes per day.  She exercises with enough effort to increase her heart rate 3 or less days per week.   She is taking medications regularly.               Review of Systems  Constitutional, HEENT, cardiovascular, pulmonary, gi and gu systems are negative, except as otherwise noted.      Objective    LMP  (LMP Unknown)   There is no height or weight on file to calculate BMI.  Physical Exam   GENERAL: alert and no distress  RESP: lungs clear to auscultation - no rales, rhonchi or wheezes  CV: regular rate and rhythm, normal S1 S2, no S3 or S4, no murmur, click or rub, no peripheral edema  MS: no gross musculoskeletal defects noted, no edema  BACK: no CVA tenderness, no paralumbar tenderness            Signed Electronically by: SHANIQUE Aldana CNP

## 2024-05-14 NOTE — PATIENT INSTRUCTIONS
It was nice to see you again today.    1.  A physical therapy referral was placed to work with one of our neuro PT's as we discussed.  2.  Restart your home exercise regimen as tolerated.  3.  Continue protein supplementation, aiming for an extra 30 g of protein daily on top of what you get in your diet.  4.  Follow-up with Dr. Heart for return virtual visit in 6 months.

## 2024-05-14 NOTE — PROGRESS NOTES
[facility]   PM&R clinic note        Interval history:     Tricia Sosa presents to clinic today for follow up reg her rehab needs.   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   Was last seen in clinic on 11/14/23.  Recommendations included:  Therapy/equipment/braces:  Outpatient physical therapy referral placed for strength and balance.  Start 1-2 protein shakes daily.  Can try either Premier protein shakes or Nestlé fair life core power shakes.  Aiming for an extra 30 to 60 g of protein daily.  Obtain soft back brace as planned for support with activities.  Referral / follow up with other providers:  Follow-up with neurology as planned for further evaluation and management of balance difficulties.  Follow up: 6 to 8 months.        Symptoms,  Tricia was seen for a return virtual visit today.  She states that with weakness she has not improved. She just fell and hurt the side of her back, and went in today and had an xray which did not demonstrate a rib fracture. It is the left side of her back which got hurt. She was sitting on a stool and fell.   She had an MRI of the pancreas which she will have a follow up for.  Her knee symptoms are about the same, and she has not had a buckling in a while.   Her appetite has been ok, and she has had no issue. She feels like she could use more protein daily. She has been mixing in whey protein daily.   She has not had any falls in the last month or two months. Her last fall prior to this most recent one was in December when she was walking down the steps. She walked down two steps and fell really hard, and didn't injure herself at all with that fall.  She did have PT sessions after our last visit and has not seen them for a while, but had continued her home exercise regimen.      Therapies/HEP,  Not currently participating in physical therapy.  Was doing a home exercise regimen, planning to  "restart.      Functionally,   Still continuing to struggle with generalized weakness, 2 falls since her last visit.      Social history is unchanged.      Medications:  Current Outpatient Medications   Medication Sig Dispense Refill    baclofen (LIORESAL) 10 MG tablet Take 1 tablet (10 mg) by mouth 3 times daily for 10 days 30 tablet 0    cyanocobalamin (VITAMIN  B-12) 1000 MCG tablet   3    folic acid (FOLVITE) 1 MG tablet   3    ketoconazole (NIZORAL) 2 % external shampoo Use every other day to scalp as needed 120 mL 0    ketorolac (ACULAR) 0.5 % ophthalmic solution Place 1 drop into the right eye daily      levothyroxine (SYNTHROID/LEVOTHROID) 150 MCG tablet Take one tablet  by mouth daily 90 tablet 0    omega 3 1000 MG CAPS Take 1,280 mg by mouth daily      prednisoLONE acetate (PRED FORTE) 1 % ophthalmic suspension Place 1 drop into the right eye 2 times daily      triamcinolone (KENALOG) 0.1 % external ointment Apply topically 2 times daily                Physical Exam:   Ht 1.81 m (5' 11.25\")   Wt 66.2 kg (146 lb)   LMP  (LMP Unknown)   BMI 20.22 kg/m    Gen: NAD, pleasant and cooperative   HEENT: Atraumatic, normocephalic, extraocular movements appear intact  Pulm: non-labored breathing in room air  Neuro/MSK:   Orientation: Oriented to person, time, place and situation, Exhibits good insight into her condition and ongoing treatment  Motor: Observed moving upper extremities actively against gravity    Labs/Imaging:  Lab Results   Component Value Date    WBC 6.7 05/14/2024    HGB 11.6 (L) 05/14/2024    HCT 34.1 (L) 05/14/2024     (H) 05/14/2024     (L) 05/14/2024     Lab Results   Component Value Date     05/01/2024    POTASSIUM 4.0 05/01/2024    CHLORIDE 104 05/01/2024    CO2 25 05/01/2024     (H) 05/01/2024     Lab Results   Component Value Date    GFRESTIMATED 88 05/01/2024    GFRESTBLACK >90 06/29/2021     Lab Results   Component Value Date    AST 27 05/01/2024    ALT 14 " "05/01/2024    ALKPHOS 110 05/01/2024    BILITOTAL 0.6 05/01/2024    BILIDIRECT 0.30 05/19/2000     No results found for: \"INR\"  Lab Results   Component Value Date    BUN 12.8 05/01/2024    CR 0.62 05/01/2024              Assessment/Plan   Tricia Sosa presents to clinic today for follow up reg her rehab needs.   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   Was last seen in clinic on 11/14/23.  Multiple rehabilitation considerations were discussed with Tricia at today's visit.  She continues to struggle with generalized weakness, and mobility difficulties in the setting of lower extremity weakness.  We discussed a referral to neuro PT to focus on strength, gait and balance.  She is agreeable to this so this referral was placed today.  In addition, continued to encourage her to take in 30 g protein supplementation daily.  We also reviewed recommendations to restart home exercise regimen with gradual introduction of repetitions which can be guided by PT once she starts.  We will plan a return virtual visit in about 6 months.  She is in agreement with this plan.      Therapy/equipment/braces:  Neuro PT referral placed for gait, balance and strength.  Restart home exercise regimen as tolerated.  Continue protein supplementation daily, aiming for an extra 30 g of protein daily.  Follow up: 5 to 6 months.      Marley Heart MD  Physical Medicine & Rehabilitation      50 minutes spent on the date of the encounter doing chart review, history and exam, documentation and further activities as noted above.           "

## 2024-05-14 NOTE — LETTER
5/14/2024         RE: Tricia Sosa  [address]  [facility] [address]        Dear Colleague,    Thank you for referring your patient, Tricia Sosa, to the [facility]. Please see a copy of my visit note below.    Virtual Visit Details    Type of service:  Video Visit     Originating Location (pt. Location): Home    Distant Location (provider location):  On-site  Platform used for Video Visit: [facility]   PM&R clinic note        Interval history:     Tricia Sosa presents to clinic today for follow up reg her rehab needs.   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   Was last seen in clinic on 11/14/23.  Recommendations included:  Therapy/equipment/braces:  Outpatient physical therapy referral placed for strength and balance.  Start 1-2 protein shakes daily.  Can try either Premier protein shakes or Nestlé fair life core power shakes.  Aiming for an extra 30 to 60 g of protein daily.  Obtain soft back brace as planned for support with activities.  Referral / follow up with other providers:  Follow-up with neurology as planned for further evaluation and management of balance difficulties.  Follow up: 6 to 8 months.        Symptoms,  Tricia was seen for a return virtual visit today.  She states that with weakness she has not improved. She just fell and hurt the side of her back, and went in today and had an xray which did not demonstrate a rib fracture. It is the left side of her back which got hurt. She was sitting on a stool and fell.   She had an MRI of the pancreas which she will have a follow up for.  Her knee symptoms are about the same, and she has not had a buckling in a while.   Her appetite has been ok, and she has had no issue. She feels like she could use more protein daily. She has been mixing in whey protein daily.   She has not had any falls in the last month or two  "months. Her last fall prior to this most recent one was in December when she was walking down the steps. She walked down two steps and fell really hard, and didn't injure herself at all with that fall.  She did have PT sessions after our last visit and has not seen them for a while, but had continued her home exercise regimen.      Therapies/HEP,  Not currently participating in physical therapy.  Was doing a home exercise regimen, planning to restart.      Functionally,   Still continuing to struggle with generalized weakness, 2 falls since her last visit.      Social history is unchanged.      Medications:  Current Outpatient Medications   Medication Sig Dispense Refill     baclofen (LIORESAL) 10 MG tablet Take 1 tablet (10 mg) by mouth 3 times daily for 10 days 30 tablet 0     cyanocobalamin (VITAMIN  B-12) 1000 MCG tablet   3     folic acid (FOLVITE) 1 MG tablet   3     ketoconazole (NIZORAL) 2 % external shampoo Use every other day to scalp as needed 120 mL 0     ketorolac (ACULAR) 0.5 % ophthalmic solution Place 1 drop into the right eye daily       levothyroxine (SYNTHROID/LEVOTHROID) 150 MCG tablet Take one tablet  by mouth daily 90 tablet 0     omega 3 1000 MG CAPS Take 1,280 mg by mouth daily       prednisoLONE acetate (PRED FORTE) 1 % ophthalmic suspension Place 1 drop into the right eye 2 times daily       triamcinolone (KENALOG) 0.1 % external ointment Apply topically 2 times daily                Physical Exam:   Ht 1.81 m (5' 11.25\")   Wt 66.2 kg (146 lb)   LMP  (LMP Unknown)   BMI 20.22 kg/m    Gen: NAD, pleasant and cooperative   HEENT: Atraumatic, normocephalic, extraocular movements appear intact  Pulm: non-labored breathing in room air  Neuro/MSK:   Orientation: Oriented to person, time, place and situation, Exhibits good insight into her condition and ongoing treatment  Motor: Observed moving upper extremities actively against gravity    Labs/Imaging:  Lab Results   Component Value Date    WBC " "6.7 05/14/2024    HGB 11.6 (L) 05/14/2024    HCT 34.1 (L) 05/14/2024     (H) 05/14/2024     (L) 05/14/2024     Lab Results   Component Value Date     05/01/2024    POTASSIUM 4.0 05/01/2024    CHLORIDE 104 05/01/2024    CO2 25 05/01/2024     (H) 05/01/2024     Lab Results   Component Value Date    GFRESTIMATED 88 05/01/2024    GFRESTBLACK >90 06/29/2021     Lab Results   Component Value Date    AST 27 05/01/2024    ALT 14 05/01/2024    ALKPHOS 110 05/01/2024    BILITOTAL 0.6 05/01/2024    BILIDIRECT 0.30 05/19/2000     No results found for: \"INR\"  Lab Results   Component Value Date    BUN 12.8 05/01/2024    CR 0.62 05/01/2024              Assessment/Plan   Tricia Sosa presents to clinic today for follow up reg her rehab needs.   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   Was last seen in clinic on 11/14/23.  Multiple rehabilitation considerations were discussed with Tricia at today's visit.  She continues to struggle with generalized weakness, and mobility difficulties in the setting of lower extremity weakness.  We discussed a referral to neuro PT to focus on strength, gait and balance.  She is agreeable to this so this referral was placed today.  In addition, continued to encourage her to take in 30 g protein supplementation daily.  We also reviewed recommendations to restart home exercise regimen with gradual introduction of repetitions which can be guided by PT once she starts.  We will plan a return virtual visit in about 6 months.  She is in agreement with this plan.      Therapy/equipment/braces:  Neuro PT referral placed for gait, balance and strength.  Restart home exercise regimen as tolerated.  Continue protein supplementation daily, aiming for an extra 30 g of protein daily.  Follow up: 5 to 6 months.      Marley Heart MD  Physical Medicine & Rehabilitation      50 minutes spent on the date of the encounter doing chart review, " history and exam, documentation and further activities as noted above.               Again, thank you for allowing me to participate in the care of your patient.        Sincerely,        Marley Heart MD

## 2024-05-14 NOTE — NURSING NOTE
Is the patient currently in the state of [place]? YES    Visit mode:VIDEO    If the visit is dropped, the patient can be reconnected by: VIDEO VISIT: Send to e-mail at: [email or web address]@"[email or web address]com    Will anyone else be joining the visit? NO  (If patient encounters technical issues they should call [phone])    How would you like to obtain your AVS? MyChart    Are changes needed to the allergy or medication list? No, Pt stated no changes to allergies, and Pt stated no med changes    Are refills needed on medications prescribed by this physician? NO    Reason for visit: RECHECK (Return [facility])    Ira LONG

## 2024-05-22 ENCOUNTER — TRANSFERRED RECORDS (OUTPATIENT)
Dept: HEALTH INFORMATION MANAGEMENT | Facility: CLINIC | Age: 84
End: 2024-05-22
Payer: MEDICARE

## 2024-05-28 ENCOUNTER — INFUSION THERAPY VISIT (OUTPATIENT)
Dept: INFUSION THERAPY | Facility: CLINIC | Age: 84
End: 2024-05-28
Attending: PHYSICIAN ASSISTANT
Payer: MEDICARE

## 2024-05-28 ENCOUNTER — ONCOLOGY VISIT (OUTPATIENT)
Dept: ONCOLOGY | Facility: CLINIC | Age: 84
End: 2024-05-28
Attending: PHYSICIAN ASSISTANT
Payer: MEDICARE

## 2024-05-28 VITALS
DIASTOLIC BLOOD PRESSURE: 76 MMHG | HEART RATE: 68 BPM | TEMPERATURE: 98 F | OXYGEN SATURATION: 99 % | WEIGHT: 146.3 LBS | BODY MASS INDEX: 20.48 KG/M2 | SYSTOLIC BLOOD PRESSURE: 131 MMHG | HEIGHT: 71 IN | RESPIRATION RATE: 18 BRPM

## 2024-05-28 DIAGNOSIS — D83.9 CVID (COMMON VARIABLE IMMUNODEFICIENCY) (H): ICD-10-CM

## 2024-05-28 DIAGNOSIS — D59.10 AUTOIMMUNE HEMOLYTIC ANEMIA (H): ICD-10-CM

## 2024-05-28 DIAGNOSIS — D80.3 SELECTIVE DEFICIENCY OF IGG SUBCLASSES (H): ICD-10-CM

## 2024-05-28 DIAGNOSIS — D83.9 CVID (COMMON VARIABLE IMMUNODEFICIENCY) (H): Primary | ICD-10-CM

## 2024-05-28 DIAGNOSIS — D59.19 OTHER AUTOIMMUNE HEMOLYTIC ANEMIA (H): ICD-10-CM

## 2024-05-28 DIAGNOSIS — D59.19 OTHER AUTOIMMUNE HEMOLYTIC ANEMIA (H): Primary | ICD-10-CM

## 2024-05-28 LAB
ALBUMIN SERPL BCG-MCNC: 4.3 G/DL (ref 3.5–5.2)
ALP SERPL-CCNC: 120 U/L (ref 40–150)
ALT SERPL W P-5'-P-CCNC: 14 U/L (ref 0–50)
ANION GAP SERPL CALCULATED.3IONS-SCNC: 13 MMOL/L (ref 7–15)
AST SERPL W P-5'-P-CCNC: 26 U/L (ref 0–45)
BASOPHILS # BLD AUTO: 0 10E3/UL (ref 0–0.2)
BASOPHILS NFR BLD AUTO: 0 %
BILIRUB SERPL-MCNC: 0.6 MG/DL
BUN SERPL-MCNC: 16.5 MG/DL (ref 8–23)
CALCIUM SERPL-MCNC: 8.4 MG/DL (ref 8.8–10.2)
CHLORIDE SERPL-SCNC: 102 MMOL/L (ref 98–107)
CREAT SERPL-MCNC: 0.55 MG/DL (ref 0.51–0.95)
DEPRECATED HCO3 PLAS-SCNC: 23 MMOL/L (ref 22–29)
EGFRCR SERPLBLD CKD-EPI 2021: 90 ML/MIN/1.73M2
EOSINOPHIL # BLD AUTO: 0 10E3/UL (ref 0–0.7)
EOSINOPHIL NFR BLD AUTO: 0 %
ERYTHROCYTE [DISTWIDTH] IN BLOOD BY AUTOMATED COUNT: 14.1 % (ref 10–15)
GLUCOSE SERPL-MCNC: 82 MG/DL (ref 70–99)
HAPTOGLOB SERPL-MCNC: <10 MG/DL (ref 30–200)
HCT VFR BLD AUTO: 30.8 % (ref 35–47)
HGB BLD-MCNC: 10.9 G/DL (ref 11.7–15.7)
IMM GRANULOCYTES # BLD: 0 10E3/UL
IMM GRANULOCYTES NFR BLD: 0 %
LDH SERPL L TO P-CCNC: 224 U/L (ref 0–250)
LYMPHOCYTES # BLD AUTO: 2.7 10E3/UL (ref 0.8–5.3)
LYMPHOCYTES NFR BLD AUTO: 52 %
MCH RBC QN AUTO: 36.8 PG (ref 26.5–33)
MCHC RBC AUTO-ENTMCNC: 35.4 G/DL (ref 31.5–36.5)
MCV RBC AUTO: 104 FL (ref 78–100)
MONOCYTES # BLD AUTO: 0.4 10E3/UL (ref 0–1.3)
MONOCYTES NFR BLD AUTO: 8 %
NEUTROPHILS # BLD AUTO: 2.1 10E3/UL (ref 1.6–8.3)
NEUTROPHILS NFR BLD AUTO: 40 %
NRBC # BLD AUTO: 0 10E3/UL
NRBC BLD AUTO-RTO: 0 /100
PLATELET # BLD AUTO: 120 10E3/UL (ref 150–450)
POTASSIUM SERPL-SCNC: 4.2 MMOL/L (ref 3.4–5.3)
PROT SERPL-MCNC: 6.2 G/DL (ref 6.4–8.3)
RBC # BLD AUTO: 2.96 10E6/UL (ref 3.8–5.2)
RETICS # AUTO: 0.07 10E6/UL (ref 0.03–0.1)
RETICS/RBC NFR AUTO: 2.3 % (ref 0.5–2)
SODIUM SERPL-SCNC: 138 MMOL/L (ref 135–145)
WBC # BLD AUTO: 5.3 10E3/UL (ref 4–11)

## 2024-05-28 PROCEDURE — 85049 AUTOMATED PLATELET COUNT: CPT | Performed by: INTERNAL MEDICINE

## 2024-05-28 PROCEDURE — 83615 LACTATE (LD) (LDH) ENZYME: CPT | Performed by: INTERNAL MEDICINE

## 2024-05-28 PROCEDURE — 80053 COMPREHEN METABOLIC PANEL: CPT | Performed by: INTERNAL MEDICINE

## 2024-05-28 PROCEDURE — 96415 CHEMO IV INFUSION ADDL HR: CPT

## 2024-05-28 PROCEDURE — 82784 ASSAY IGA/IGD/IGG/IGM EACH: CPT | Performed by: INTERNAL MEDICINE

## 2024-05-28 PROCEDURE — 96413 CHEMO IV INFUSION 1 HR: CPT

## 2024-05-28 PROCEDURE — 250N000011 HC RX IP 250 OP 636: Mod: JZ | Performed by: PHYSICIAN ASSISTANT

## 2024-05-28 PROCEDURE — 250N000013 HC RX MED GY IP 250 OP 250 PS 637: Performed by: PHYSICIAN ASSISTANT

## 2024-05-28 PROCEDURE — 258N000003 HC RX IP 258 OP 636: Performed by: PHYSICIAN ASSISTANT

## 2024-05-28 PROCEDURE — 99214 OFFICE O/P EST MOD 30 MIN: CPT | Performed by: PHYSICIAN ASSISTANT

## 2024-05-28 PROCEDURE — G0463 HOSPITAL OUTPT CLINIC VISIT: HCPCS | Performed by: PHYSICIAN ASSISTANT

## 2024-05-28 PROCEDURE — 85045 AUTOMATED RETICULOCYTE COUNT: CPT | Performed by: INTERNAL MEDICINE

## 2024-05-28 PROCEDURE — 36415 COLL VENOUS BLD VENIPUNCTURE: CPT

## 2024-05-28 PROCEDURE — 83010 ASSAY OF HAPTOGLOBIN QUANT: CPT | Performed by: INTERNAL MEDICINE

## 2024-05-28 RX ORDER — METHYLPREDNISOLONE SODIUM SUCCINATE 125 MG/2ML
125 INJECTION, POWDER, LYOPHILIZED, FOR SOLUTION INTRAMUSCULAR; INTRAVENOUS
Status: CANCELLED
Start: 2024-05-29

## 2024-05-28 RX ORDER — DIPHENHYDRAMINE HCL 25 MG
50 CAPSULE ORAL ONCE
Status: COMPLETED | OUTPATIENT
Start: 2024-05-28 | End: 2024-05-28

## 2024-05-28 RX ORDER — HEPARIN SODIUM,PORCINE 10 UNIT/ML
5 VIAL (ML) INTRAVENOUS
Status: CANCELLED | OUTPATIENT
Start: 2024-05-29

## 2024-05-28 RX ORDER — ALBUTEROL SULFATE 0.83 MG/ML
2.5 SOLUTION RESPIRATORY (INHALATION)
Status: CANCELLED | OUTPATIENT
Start: 2024-05-29

## 2024-05-28 RX ORDER — MEPERIDINE HYDROCHLORIDE 25 MG/ML
25 INJECTION INTRAMUSCULAR; INTRAVENOUS; SUBCUTANEOUS EVERY 30 MIN PRN
Status: CANCELLED | OUTPATIENT
Start: 2024-05-29

## 2024-05-28 RX ORDER — HEPARIN SODIUM (PORCINE) LOCK FLUSH IV SOLN 100 UNIT/ML 100 UNIT/ML
5 SOLUTION INTRAVENOUS
Status: CANCELLED | OUTPATIENT
Start: 2024-05-29

## 2024-05-28 RX ORDER — ACETAMINOPHEN 325 MG/1
650 TABLET ORAL ONCE
Status: COMPLETED | OUTPATIENT
Start: 2024-05-28 | End: 2024-05-28

## 2024-05-28 RX ORDER — NALOXONE HYDROCHLORIDE 0.4 MG/ML
0.2 INJECTION, SOLUTION INTRAMUSCULAR; INTRAVENOUS; SUBCUTANEOUS
Status: CANCELLED | OUTPATIENT
Start: 2024-05-29

## 2024-05-28 RX ORDER — EPINEPHRINE 1 MG/ML
0.3 INJECTION, SOLUTION INTRAMUSCULAR; SUBCUTANEOUS EVERY 5 MIN PRN
Status: CANCELLED | OUTPATIENT
Start: 2024-05-29

## 2024-05-28 RX ORDER — DIPHENHYDRAMINE HCL 25 MG
50 CAPSULE ORAL ONCE
Status: CANCELLED | OUTPATIENT
Start: 2024-05-29

## 2024-05-28 RX ORDER — MEPERIDINE HYDROCHLORIDE 25 MG/ML
25 INJECTION INTRAMUSCULAR; INTRAVENOUS; SUBCUTANEOUS
Status: CANCELLED
Start: 2024-05-29

## 2024-05-28 RX ORDER — ALBUTEROL SULFATE 90 UG/1
1-2 AEROSOL, METERED RESPIRATORY (INHALATION)
Status: CANCELLED
Start: 2024-05-29

## 2024-05-28 RX ORDER — DIPHENHYDRAMINE HYDROCHLORIDE 50 MG/ML
50 INJECTION INTRAMUSCULAR; INTRAVENOUS
Status: CANCELLED
Start: 2024-05-29

## 2024-05-28 RX ORDER — ACETAMINOPHEN 325 MG/1
650 TABLET ORAL ONCE
Status: CANCELLED | OUTPATIENT
Start: 2024-05-29

## 2024-05-28 RX ADMIN — RITUXIMAB-ABBS 700 MG: 10 INJECTION, SOLUTION INTRAVENOUS at 14:10

## 2024-05-28 RX ADMIN — SODIUM CHLORIDE 250 ML: 9 INJECTION, SOLUTION INTRAVENOUS at 13:47

## 2024-05-28 RX ADMIN — ACETAMINOPHEN 325 MG: 325 TABLET ORAL at 13:46

## 2024-05-28 RX ADMIN — DIPHENHYDRAMINE HYDROCHLORIDE 25 MG: 25 CAPSULE ORAL at 13:46

## 2024-05-28 ASSESSMENT — PAIN SCALES - GENERAL: PAINLEVEL: NO PAIN (0)

## 2024-05-28 NOTE — PROGRESS NOTES
Nursing Note:  Tricia Sosa presents today for Labs + PIV insertion for infusion.    Patient to be seen by provider today: Yes: Trinidad Oden PA-C   present during visit today: Not Applicable.    Note: N/A.    Intravenous Access:  Labs drawn without difficulty.  Peripheral IV placed.    Discharge Plan:   Patient was sent to [facility] for provider appointment, followed by infusion.      Hayes Hernandez RN

## 2024-05-28 NOTE — PROGRESS NOTES
Infusion Nursing Note:  Tricia Sosa presents today for C13D1 Rituximab.    Patient seen by provider today: Yes: Trinidad Oden PA-C   present during visit today: Not Applicable.    Note: pt requests to only take 325mg of Tylenol and 25mg of Benadryl.  Infused at 200 mL/hr x 30 minutes and then 400 mL/hr x 60 minutes    Intravenous Access:  Peripheral IV placed by FTRN.    Treatment Conditions:  Not Applicable.      Post Infusion Assessment:  Patient tolerated infusion without incident.  Blood return noted pre and post infusion.  Site patent and intact, free from redness, edema or discomfort.  No evidence of extravasations.  Access discontinued per protocol.       Discharge Plan:   Discharge instructions reviewed with: Patient.  Patient and/or family verbalized understanding of discharge instructions and all questions answered.  AVS to patient via [employer].  Scheduling request sent by Trinidad for next Rituximab infusion.   Patient discharged in stable condition accompanied by: self.  Departure Mode: Ambulatory.      Tricia Storey RN

## 2024-05-28 NOTE — PROGRESS NOTES
Oncology/Hematology Visit Note    May 28, 2024    Reason for visit: Follow up of CVID, autoimmune hemolytic anemia, iron deficiency      Oncology HPI: Tricia Sosa is a 83 year old female who presents with autoimmune hemolytic anemia and IgA deficiency. She was previously seen at [facility].     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  She was very sick at the time and had severe diarrhea.  She lost about 40 pounds in weight.  The workup revealed that she had markedly diminished CD4 counts of 48 and was diagnosed with CMV colitis.  Since then she has been followed in hematology clinic at [facility] until recently.  She was noted to have anemia which was fairly long-standing.  She was initially referred for anemia in 2004 and also had a bone marrow biopsy done at that time which revealed hypercellular bone marrow with 70-80% cellularity and normal trilineage hematopoiesis and no morphologic features of MDS or lymphoproliferation.  Her anemia was attributed to her chronic underlying disease.  At the next evaluation in 2002 on 4 aggressive anemia there was no evidence of hemolysis, iron deficiency, B12 or folate deficiency.  Bone marrow biopsy was not pursued.  In summer of 2015 she had progressive fatigue, dyspnea on exertion and worsening anemia with a hemoglobin now of 9.  Workup at this time did suggest a hemolytic anemia with elevated LDH at 709, undetectable haptoglobin and elevated unconjugated bilirubin at 3.3.  Monospecific MARIKA was positive for both IgG and complement.  Cold agglutinin screen was positive with very low titer 1:64.  She was started on prednisone 60 mg daily with supplementation of iron folate and B12 starting 7/31/15.  Her prednisone has been slowly tapered and was discontinued off in January 2016.  She was last followed at [facility] on March 10, 2016 when she had stable hemoglobin.     She was followed by Dr. Matos and Dr. Summers.  Due to relapse of  hemolytic anemia, she underwent another course of prednisone that was tapered off and rituximab x 4 weekly doses completed in 9/19/19.     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  She started weekly Rituxan on 7/31/20 x 4 doses, completed on 8/21/20.  She tapered off of prednisone in October 2020.     Due to relapse of her AIHA, she started prednisone again on 6/8/21 at 60 mg daily with slow taper and finished taper in August 2021.     Due to relapse of AIHA again, she started prednisone again on 11/2/21 at 70 mg daily with slow taper.  She also completed weekly Rituxan again x 4 doses 12/6/21-12/29/21.     Venofer 5/5-5/19/22.     She is now on Rituxan every 2 months and IVIG every 8 weeks if IgG <600.    Interval History: Tricia is here unaccompanied.  She continues to tolerate Rituxan well.  She continues to have generalized weakness and she has been following up with neurology for this.  They plan on ruling out autoimmune etiology with a lumbar puncture in the near future.  Appetite has been pretty good.  No fever, chills, vomiting, diarrhea or bleeding.    Review of Systems: See interval hx. Denies fevers, chills, changes in vision, CP, SOB, abdominal pain, N/V, diarrhea, changes in urination, bleeding, bruising.     PMHx and Social Hx reviewed per EPIC.      Medications:  Current Outpatient Medications   Medication Sig Dispense Refill    cyanocobalamin (VITAMIN  B-12) 1000 MCG tablet   3    folic acid (FOLVITE) 1 MG tablet   3    ketoconazole (NIZORAL) 2 % external shampoo Use every other day to scalp as needed 120 mL 0    ketorolac (ACULAR) 0.5 % ophthalmic solution Place 1 drop into the right eye daily      levothyroxine (SYNTHROID/LEVOTHROID) 150 MCG tablet Take one tablet  by mouth daily 90 tablet 0    omega 3 1000 MG CAPS Take 1,280 mg by mouth daily      prednisoLONE acetate (PRED FORTE) 1 % ophthalmic suspension Place 1 drop into the right  "eye 2 times daily      triamcinolone (KENALOG) 0.1 % external ointment Apply topically 2 times daily         Allergies   Allergen Reactions    Doxycycline        EXAM:    /76   Pulse 68   Temp 98  F (36.7  C) (Temporal)   Resp 18   Ht 1.81 m (5' 11.26\")   Wt 66.4 kg (146 lb 4.8 oz)   LMP  (LMP Unknown)   SpO2 99%   BMI 20.26 kg/m      GENERAL:  Female, in no acute distress.  Alert and oriented x3. Well groomed.   HEENT:  Normocephalic, atraumatic. No conjunctival injection or eye swelling.   LUNGS:  Nonlabored breathing, no cough or audible wheezing, able to speak full sentences.  MSK: Full ROM UE.    SKIN: No rash on exposed skin.   NEURO: CN grossly intact, speech normal  PSYCH: Mentation appears normal, insight and judgement intact      Labs:    05/28/24 12:51   Sodium 138   Potassium 4.2   Chloride 102   Carbon Dioxide (CO2) 23   Urea Nitrogen 16.5   Creatinine 0.55   GFR Estimate 90   Calcium 8.4 (L)   Anion Gap 13   Albumin 4.3   Protein Total 6.2 (L)   Alkaline Phosphatase 120   ALT 14   AST 26   Bilirubin Total 0.6   Glucose 82   Lactate Dehydrogenase 224   WBC 5.3   Hemoglobin 10.9 (L)   Hematocrit 30.8 (L)   Platelet Count 120 (L)   RBC Count 2.96 (L)    (H)   MCH 36.8 (H)   MCHC 35.4   RDW 14.1   % Neutrophils 40   % Lymphocytes 52   % Monocytes 8   % Eosinophils 0   % Basophils 0   Absolute Basophils 0.0   Absolute Eosinophils 0.0   Absolute Immature Granulocytes 0.0   Absolute Lymphocytes 2.7   Absolute Monocytes 0.4   % Immature Granulocytes 0   Absolute Neutrophils 2.1   Absolute NRBCs 0.0   NRBCs per 100 WBC 0   % Retic 2.3 (H)   Absolute Retic 0.067     Imaging: n/a    Assessment and Plan:  # Warm Autoimmune Hemolytic Anemia  (positive MARIKA to IgG and complement). Has had multiple relapses, most recently November 2021. She completed prolonged prednisone taper and Rituxan. She is off prednisone now. Has bactrim when she is on prednisone. She is now on Rituxan every 2 months with " stable labs  - CBC reviewed. Hgb stable 10.9, platelets stable 120K. Retic slightly elevated 2.3, but overall stable.  LDH, bili WNL so low concern for hemolysis   - Continue with Rituxan every 2 months, give today, due late July 2024  - Will follow-up with MD in 2 months with Rituxan    # CVID  Follows with immunology as well.   - Doing IVIG every 8 weeks when IGG level is <600.  IgG 408 on 5/1/2024, pending today.  IVIG not given today, but she will schedule.  - Continue monthly monitoring   - CD4 count was low, met with ID, no indication for PJP ppx at this time      # EVI  Prior ferritin was low at 6 in April 2022. S/p Venofer x 3.   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 Thankfully PET without any suspicious lymph node or marrow update  - Colonic polyp benign. Had chronic inactive gastritis but otherwise nothing on EGD. Does not need further endoscopy   - Pancreas MRI with cysts <10mm.  GI referral placed in the past, we will follow-up.      Chart documentation with Dragon Voice recognition Software. Although reviewed after completion, some words and grammatical errors may remain.    23 minutes spent on the date of the encounter doing chart review, review of test results, interpretation of tests, patient visit, and documentation     Trinidad Liu PA-C  Hematology/Oncology  [facility]

## 2024-05-28 NOTE — NURSING NOTE
"Oncology Rooming Note    May 28, 2024 1:02 PM   Tricia Sosa is a 83 year old female who presents for:    Chief Complaint   Patient presents with    Oncology Clinic Visit     Initial Vitals: /76   Pulse 68   Temp 98  F (36.7  C) (Temporal)   Resp 18   Ht 1.81 m (5' 11.26\")   Wt 66.4 kg (146 lb 4.8 oz)   LMP  (LMP Unknown)   SpO2 99%   BMI 20.26 kg/m   Estimated body mass index is 20.26 kg/m  as calculated from the following:    Height as of this encounter: 1.81 m (5' 11.26\").    Weight as of this encounter: 66.4 kg (146 lb 4.8 oz). Body surface area is 1.83 meters squared.  No Pain (0) Comment: Data Unavailable   No LMP recorded (lmp unknown). Patient is postmenopausal.  Allergies reviewed: Yes  Medications reviewed: Yes    Medications: Medication refills not needed today.  Pharmacy name entered into [facility]:    [place], [place] - [address] [id number] [place], [place] - [address] [facility]    Frailty Screening:   Is the patient here for a new oncology consult visit in cancer care? 2. No      Clinical concerns: follow up       Hawa Victor MA              "

## 2024-05-28 NOTE — LETTER
5/28/2024         RE: Tricia Sosa  [address]  [facility] [facility] [address]        Dear Colleague,    Thank you for referring your patient, Tricia Sosa, to the [facility] [place]. Please see a copy of my visit note below.    Oncology/Hematology Visit Note    May 28, 2024    Reason for visit: Follow up of CVID, autoimmune hemolytic anemia, iron deficiency      Oncology HPI: Tricia Sosa is a 83 year old female who presents with autoimmune hemolytic anemia and IgA deficiency. She was previously seen at [facility].     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  She was very sick at the time and had severe diarrhea.  She lost about 40 pounds in weight.  The workup revealed that she had markedly diminished CD4 counts of 48 and was diagnosed with CMV colitis.  Since then she has been followed in hematology clinic at [place] until recently.  She was noted to have anemia which was fairly long-standing.  She was initially referred for anemia in 2004 and also had a bone marrow biopsy done at that time which revealed hypercellular bone marrow with 70-80% cellularity and normal trilineage hematopoiesis and no morphologic features of MDS or lymphoproliferation.  Her anemia was attributed to her chronic underlying disease.  At the next evaluation in 2002 on 4 aggressive anemia there was no evidence of hemolysis, iron deficiency, B12 or folate deficiency.  Bone marrow biopsy was not pursued.  In summer of 2015 she had progressive fatigue, dyspnea on exertion and worsening anemia with a hemoglobin now of 9.  Workup at this time did suggest a hemolytic anemia with elevated LDH at 709, undetectable haptoglobin and elevated unconjugated bilirubin at 3.3.  Monospecific MARIKA was positive for both IgG and complement.  Cold agglutinin screen was positive with very low titer 1:64.  She was started on prednisone 60 mg daily with supplementation of iron  folate and B12 starting 7/31/15.  Her prednisone has been slowly tapered and was discontinued off in January 2016.  She was last followed at [facility] on March 10, 2016 when she had stable hemoglobin.     She was followed by Dr. Matos and Dr. Summers.  Due to relapse of hemolytic anemia, she underwent another course of prednisone that was tapered off and rituximab x 4 weekly doses completed in 9/19/19.     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  She started weekly Rituxan on 7/31/20 x 4 doses, completed on 8/21/20.  She tapered off of prednisone in October 2020.     Due to relapse of her AIHA, she started prednisone again on 6/8/21 at 60 mg daily with slow taper and finished taper in August 2021.     Due to relapse of AIHA again, she started prednisone again on 11/2/21 at 70 mg daily with slow taper.  She also completed weekly Rituxan again x 4 doses 12/6/21-12/29/21.     Venofer 5/5-5/19/22.     She is now on Rituxan every 2 months and IVIG every 8 weeks if IgG <600.    Interval History: Tricia is here unaccompanied.  She continues to tolerate Rituxan well.  She continues to have generalized weakness and she has been following up with neurology for this.  They plan on ruling out autoimmune etiology with a lumbar puncture in the near future.  Appetite has been pretty good.  No fever, chills, vomiting, diarrhea or bleeding.    Review of Systems: See interval hx. Denies fevers, chills, changes in vision, CP, SOB, abdominal pain, N/V, diarrhea, changes in urination, bleeding, bruising.     PMHx and Social Hx reviewed per EPIC.      Medications:  Current Outpatient Medications   Medication Sig Dispense Refill     cyanocobalamin (VITAMIN  B-12) 1000 MCG tablet   3     folic acid (FOLVITE) 1 MG tablet   3     ketoconazole (NIZORAL) 2 % external shampoo Use every other day to scalp as needed 120 mL 0     ketorolac (ACULAR) 0.5 % ophthalmic solution Place 1 drop  "into the right eye daily       levothyroxine (SYNTHROID/LEVOTHROID) 150 MCG tablet Take one tablet  by mouth daily 90 tablet 0     omega 3 1000 MG CAPS Take 1,280 mg by mouth daily       prednisoLONE acetate (PRED FORTE) 1 % ophthalmic suspension Place 1 drop into the right eye 2 times daily       triamcinolone (KENALOG) 0.1 % external ointment Apply topically 2 times daily         Allergies   Allergen Reactions     Doxycycline        EXAM:    /76   Pulse 68   Temp 98  F (36.7  C) (Temporal)   Resp 18   Ht 1.81 m (5' 11.26\")   Wt 66.4 kg (146 lb 4.8 oz)   LMP  (LMP Unknown)   SpO2 99%   BMI 20.26 kg/m      GENERAL:  Female, in no acute distress.  Alert and oriented x3. Well groomed.   HEENT:  Normocephalic, atraumatic. No conjunctival injection or eye swelling.   LUNGS:  Nonlabored breathing, no cough or audible wheezing, able to speak full sentences.  MSK: Full ROM UE.    SKIN: No rash on exposed skin.   NEURO: CN grossly intact, speech normal  PSYCH: Mentation appears normal, insight and judgement intact      Labs:    05/28/24 12:51   Sodium 138   Potassium 4.2   Chloride 102   Carbon Dioxide (CO2) 23   Urea Nitrogen 16.5   Creatinine 0.55   GFR Estimate 90   Calcium 8.4 (L)   Anion Gap 13   Albumin 4.3   Protein Total 6.2 (L)   Alkaline Phosphatase 120   ALT 14   AST 26   Bilirubin Total 0.6   Glucose 82   Lactate Dehydrogenase 224   WBC 5.3   Hemoglobin 10.9 (L)   Hematocrit 30.8 (L)   Platelet Count 120 (L)   RBC Count 2.96 (L)    (H)   MCH 36.8 (H)   MCHC 35.4   RDW 14.1   % Neutrophils 40   % Lymphocytes 52   % Monocytes 8   % Eosinophils 0   % Basophils 0   Absolute Basophils 0.0   Absolute Eosinophils 0.0   Absolute Immature Granulocytes 0.0   Absolute Lymphocytes 2.7   Absolute Monocytes 0.4   % Immature Granulocytes 0   Absolute Neutrophils 2.1   Absolute NRBCs 0.0   NRBCs per 100 WBC 0   % Retic 2.3 (H)   Absolute Retic 0.067     Imaging: n/a    Assessment and Plan:  # Warm " Autoimmune Hemolytic Anemia  (positive MARIKA to IgG and complement). Has had multiple relapses, most recently November 2021. She completed prolonged prednisone taper and Rituxan. She is off prednisone now. Has bactrim when she is on prednisone. She is now on Rituxan every 2 months with stable labs  - CBC reviewed. Hgb stable 10.9, platelets stable 120K. Retic slightly elevated 2.3, but overall stable.  LDH, bili WNL so low concern for hemolysis   - Continue with Rituxan every 2 months, give today, due late July 2024  - Will follow-up with MD in 2 months with Rituxan    # CVID  Follows with immunology as well.   - Doing IVIG every 8 weeks when IGG level is <600.  IgG 408 on 5/1/2024, pending today.  IVIG not given today, but she will schedule.  - Continue monthly monitoring   - CD4 count was low, met with ID, no indication for PJP ppx at this time      # EVI  Prior ferritin was low at 6 in April 2022. S/p Venofer x 3.   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 Thankfully PET without any suspicious lymph node or marrow update  - Colonic polyp benign. Had chronic inactive gastritis but otherwise nothing on EGD. Does not need further endoscopy   - Pancreas MRI with cysts <10mm.  GI referral placed in the past, we will follow-up.      Chart documentation with Dragon Voice recognition Software. Although reviewed after completion, some words and grammatical errors may remain.    23 minutes spent on the date of the encounter doing chart review, review of test results, interpretation of tests, patient visit, and documentation     Trinidad Liu PA-C  Hematology/Oncology  [facility]                  Again, thank you for allowing me to participate in the care of your  patient.        Sincerely,        Trinidad Liu PA-C

## 2024-05-29 LAB
IGA SERPL-MCNC: <2 MG/DL (ref 84–499)
IGG SERPL-MCNC: 605 MG/DL (ref 610–1616)
IGM SERPL-MCNC: <10 MG/DL (ref 35–242)

## 2024-06-16 ENCOUNTER — HEALTH MAINTENANCE LETTER (OUTPATIENT)
Age: 84
End: 2024-06-16

## 2024-06-18 ENCOUNTER — DOCUMENTATION ONLY (OUTPATIENT)
Dept: PHARMACY | Facility: CLINIC | Age: 84
End: 2024-06-18
Payer: MEDICARE

## 2024-06-18 NOTE — PROGRESS NOTES
Pharmacist IVIG Stewardship Program    Diagnosis: Common Variable Immunodeficiency  Patient was originally diagnosed with [place] back in the 2000's and later transferred her care to [facility]   Date 5/27/2015   IgA Level  Undetectable   IgG Total 658   IgG2 106   IgG4 0.5   IgM Level 48     Current Dosing Regimen: Privigen 35g IV every 8 weeks if IgG <600mg/dL.    Titrated down from every 3 week infusions to every 4 week infusions in 2017  Titrated down further from every 4 weeks to every 6 weeks infusions in March 2018, but patient declined drastically and had to be escalated back to monthly infusions in September 2018  On IVIG every 8 weeks if IgG <600 per provider appointment in February 2024  Pre-medications:  Methylprednisolone 20 mg IV push prior to infusion  Current Titration Regimen: 0.5ml/kg/hr and increased by 0.5ml/kg/hr every 15 minutes, max rate 4ml/kg/hr.  Previously Tried Products: Gammagard SD, currently on Privigen    Side Effects: Patient denies side effects such as headaches, flushing, fever, muscle or joint pain, nausea, or rash/itching.    Interventions: Lab review completed. Care team notified.       Assessment:   Date  5/28/2024   IgG 605   Patient is appropriate for additional IVIG therapy when their level is within range. She is tolerating infusions well and IVIG infusions are effective in increasing Ig levels to an appropriate level to minimize patients risk of infection. Patient should continue on treatment as she has been per provider orders, without therapy her levels would drop below therapeutic range.    Plan: Patient is not okay to proceed with IVIG infusions at this time, once additional labs are drawn patient will need to be reassessed for necessity of additional IVIG infusions    Next Review Needed: 7/2024    Lynsey Narvaez, PharmD, IgCP  Medication Access Pharmacist

## 2024-06-19 ENCOUNTER — PATIENT OUTREACH (OUTPATIENT)
Dept: ONCOLOGY | Facility: CLINIC | Age: 84
End: 2024-06-19
Payer: MEDICARE

## 2024-06-19 DIAGNOSIS — D83.9 CVID (COMMON VARIABLE IMMUNODEFICIENCY) (H): Primary | ICD-10-CM

## 2024-06-19 NOTE — TELEPHONE ENCOUNTER
Writer spoke with Trinidad Liu PA-C regarding patient's case. Per Trinidad, patient will need to be re-drawn prior to her infusion appointment on Tuesday, 6/25/24, to see if she qualifies for treatment with IVIG.    Writer called and spoke with patient to update her of the above. Patient to discuss possible home care/home draw with Dr. Ramos during her visit with her on 7/22/24, as she will need an in-person visit to assess if she qualifies for this.     Patient is now scheduled for repeat lab draw (standing order adjusted, per Trinidad to every 8 weeks intervals to better align with potential IVIG treatments in the future as well), on Friday 6/21/24.     Writer will update pharmacy team.    Roya Ng RN, BSN, PHN, OCN     6/19/2024 at 12:25 PM  Nurse Care Coordinator  [facility] [place]  P: [phone]   F: [phone]

## 2024-06-19 NOTE — TELEPHONE ENCOUNTER
"From: Lynsey Narvaez, [facility]   Sent: 6/18/2024   3:58 PM CDT   To: CHELSEA Marlow   Subject: IVIG Infusion Needs to be Cancelled              Hello,     I am not sure who to send this to. I would typically inform Brandee Bowman but I cannot find her in EPIC anymore. I was reviewing Tricia Sosa for the IVIG Stewardship Program her IgG level came back above 600 mg/dL (her infusion parameter). Her appt for 6/25/24 will need to be cancelled. Do you have a care coordinator I can speak to about this?     Thank you,   Lynsey Narvaez, PharmD, IgCP   Medication Access Pharmacist       Writer called patient to update her of the above. Patient indicated that she \"always drops by the time I'm due for my infusion\". Writer advised that if patient does get IVIG treatment as scheduled on 6/25/24, that insurance most likely won't cover this treatment and her out of pocket cost is likely to be very, very high. Patient is inquiring if she can have her lab work rechecked to see if she would now qualify, as her las draw was on 5/28/24. She is also inquiring about having home phlebotomy team come to her house to draw. She states she has insurance coverage for home care.    Writer will route to providers for review and will follow up with patient with their recommendations.     Roya Ng, RN, BSN, PHN, OCN     6/19/2024 at 9:13 AM  Nurse Care Coordinator  [facility] [place]  P: [phone]   F: [phone]      "

## 2024-06-21 ENCOUNTER — INFUSION THERAPY VISIT (OUTPATIENT)
Dept: INFUSION THERAPY | Facility: CLINIC | Age: 84
End: 2024-06-21
Attending: PHYSICIAN ASSISTANT
Payer: MEDICARE

## 2024-06-21 DIAGNOSIS — D83.9 CVID (COMMON VARIABLE IMMUNODEFICIENCY) (H): ICD-10-CM

## 2024-06-21 PROCEDURE — 82784 ASSAY IGA/IGD/IGG/IGM EACH: CPT | Performed by: PHYSICIAN ASSISTANT

## 2024-06-21 PROCEDURE — 36415 COLL VENOUS BLD VENIPUNCTURE: CPT

## 2024-06-21 NOTE — PROGRESS NOTES
Infusion Nursing Note:  Tricia Sosa presents today for lab draw.    Patient seen by provider today: No   present during visit today: Not Applicable.    Note: N/A.      Intravenous Access:  Lab draw site right lower forearm, Needle type butterfly, Gauge 23.  Labs drawn without difficulty.      Discharge Plan:   AVS to patient via MYCHART.  Patient will return 6/25/24 for IVIG for next appointment.   Patient discharged in stable condition accompanied by: self.  Departure Mode: Ambulatory.      Ally Adams RN

## 2024-06-24 ENCOUNTER — HOSPITAL ENCOUNTER (OUTPATIENT)
Dept: MAMMOGRAPHY | Facility: CLINIC | Age: 84
Discharge: HOME | End: 2024-06-24
Payer: MEDICARE

## 2024-06-24 ENCOUNTER — DOCUMENTATION ONLY (OUTPATIENT)
Dept: PHARMACY | Facility: CLINIC | Age: 84
End: 2024-06-24
Payer: MEDICARE

## 2024-06-24 DIAGNOSIS — Z12.31 VISIT FOR SCREENING MAMMOGRAM: ICD-10-CM

## 2024-06-24 LAB
IGA SERPL-MCNC: <2 MG/DL (ref 84–499)
IGG SERPL-MCNC: 467 MG/DL (ref 610–1616)
IGM SERPL-MCNC: <10 MG/DL (ref 35–242)

## 2024-06-24 PROCEDURE — 77063 BREAST TOMOSYNTHESIS BI: CPT

## 2024-06-24 NOTE — PROGRESS NOTES
Pharmacist IVIG Stewardship Program    Diagnosis: Common Variable Immunodeficiency  Patient was originally diagnosed with [place] back in the 2000's and later transferred her care to [facility]   Date 5/27/2015   IgA Level  Undetectable   IgG Total 658   IgG2 106   IgG4 0.5   IgM Level 48     Current Dosing Regimen: Privigen 35g IV every 8 weeks if IgG <600mg/dL.    Titrated down from every 3 week infusions to every 4 week infusions in 2017  Titrated down further from every 4 weeks to every 6 weeks infusions in March 2018, but patient declined drastically and had to be escalated back to monthly infusions in September 2018  On IVIG every 8 weeks if IgG <600 per provider appointment in February 2024  Pre-medications:  Methylprednisolone 20 mg IV push prior to infusion  Current Titration Regimen: 0.5ml/kg/hr and increased by 0.5ml/kg/hr every 15 minutes, max rate 4ml/kg/hr.  Previously Tried Products: Gammagard SD, currently on Privigen    Side Effects: Patient denies side effects such as headaches, flushing, fever, muscle or joint pain, nausea, or rash/itching.    Interventions: Lab review completed. Care team notified.       Assessment:   Date  6/21/2024   IgG 467   Patient is appropriate for additional IVIG therapy when their level is within range. She is tolerating infusions well and IVIG infusions are effective in increasing Ig levels to an appropriate level to minimize patients risk of infection. Patient should continue on treatment as she has been per provider orders, without therapy her levels would drop below therapeutic range.    Plan: Patient is okay to proceed with IVIG infusion on 6/25/24, once additional labs are drawn patient will need to be reassessed for necessity of additional IVIG infusions    Next Review Needed: 8/2024    Lynsey Narvaez, PharmD, IgCP  Medication Access Pharmacist

## 2024-06-25 ENCOUNTER — INFUSION THERAPY VISIT (OUTPATIENT)
Dept: INFUSION THERAPY | Facility: CLINIC | Age: 84
End: 2024-06-25
Attending: INTERNAL MEDICINE
Payer: MEDICARE

## 2024-06-25 VITALS
HEART RATE: 76 BPM | TEMPERATURE: 99 F | WEIGHT: 146 LBS | BODY MASS INDEX: 20.21 KG/M2 | OXYGEN SATURATION: 95 % | SYSTOLIC BLOOD PRESSURE: 117 MMHG | RESPIRATION RATE: 16 BRPM | DIASTOLIC BLOOD PRESSURE: 55 MMHG

## 2024-06-25 DIAGNOSIS — D80.3 SELECTIVE DEFICIENCY OF IGG SUBCLASSES (H): ICD-10-CM

## 2024-06-25 DIAGNOSIS — D59.19 OTHER AUTOIMMUNE HEMOLYTIC ANEMIA (H): Primary | ICD-10-CM

## 2024-06-25 DIAGNOSIS — D83.9 CVID (COMMON VARIABLE IMMUNODEFICIENCY) (H): ICD-10-CM

## 2024-06-25 DIAGNOSIS — D59.10 AUTOIMMUNE HEMOLYTIC ANEMIA (H): ICD-10-CM

## 2024-06-25 PROCEDURE — 96366 THER/PROPH/DIAG IV INF ADDON: CPT

## 2024-06-25 PROCEDURE — 250N000011 HC RX IP 250 OP 636: Performed by: INTERNAL MEDICINE

## 2024-06-25 PROCEDURE — 96375 TX/PRO/DX INJ NEW DRUG ADDON: CPT

## 2024-06-25 PROCEDURE — 96365 THER/PROPH/DIAG IV INF INIT: CPT

## 2024-06-25 RX ORDER — EPINEPHRINE 1 MG/ML
0.3 INJECTION, SOLUTION INTRAMUSCULAR; SUBCUTANEOUS EVERY 5 MIN PRN
Status: CANCELLED | OUTPATIENT
Start: 2024-06-26

## 2024-06-25 RX ORDER — NALOXONE HYDROCHLORIDE 0.4 MG/ML
0.2 INJECTION, SOLUTION INTRAMUSCULAR; INTRAVENOUS; SUBCUTANEOUS
Status: CANCELLED | OUTPATIENT
Start: 2024-06-26

## 2024-06-25 RX ORDER — METHYLPREDNISOLONE SODIUM SUCCINATE 125 MG/2ML
125 INJECTION, POWDER, LYOPHILIZED, FOR SOLUTION INTRAMUSCULAR; INTRAVENOUS
Status: CANCELLED
Start: 2024-06-26

## 2024-06-25 RX ORDER — METHYLPREDNISOLONE SODIUM SUCCINATE 40 MG/ML
20 INJECTION, POWDER, LYOPHILIZED, FOR SOLUTION INTRAMUSCULAR; INTRAVENOUS ONCE
Status: CANCELLED
Start: 2024-08-19 | End: 2024-06-26

## 2024-06-25 RX ORDER — MEPERIDINE HYDROCHLORIDE 25 MG/ML
25 INJECTION INTRAMUSCULAR; INTRAVENOUS; SUBCUTANEOUS EVERY 30 MIN PRN
Status: CANCELLED | OUTPATIENT
Start: 2024-06-26

## 2024-06-25 RX ORDER — HEPARIN SODIUM,PORCINE 10 UNIT/ML
5 VIAL (ML) INTRAVENOUS
Status: CANCELLED | OUTPATIENT
Start: 2024-06-26

## 2024-06-25 RX ORDER — METHYLPREDNISOLONE SODIUM SUCCINATE 40 MG/ML
20 INJECTION, POWDER, LYOPHILIZED, FOR SOLUTION INTRAMUSCULAR; INTRAVENOUS ONCE
Status: COMPLETED | OUTPATIENT
Start: 2024-06-25 | End: 2024-06-25

## 2024-06-25 RX ORDER — HEPARIN SODIUM (PORCINE) LOCK FLUSH IV SOLN 100 UNIT/ML 100 UNIT/ML
5 SOLUTION INTRAVENOUS
Status: CANCELLED | OUTPATIENT
Start: 2024-06-26

## 2024-06-25 RX ORDER — ALBUTEROL SULFATE 0.83 MG/ML
2.5 SOLUTION RESPIRATORY (INHALATION)
Status: CANCELLED | OUTPATIENT
Start: 2024-06-26

## 2024-06-25 RX ORDER — ALBUTEROL SULFATE 90 UG/1
1-2 AEROSOL, METERED RESPIRATORY (INHALATION)
Status: CANCELLED
Start: 2024-06-26

## 2024-06-25 RX ORDER — DIPHENHYDRAMINE HYDROCHLORIDE 50 MG/ML
50 INJECTION INTRAMUSCULAR; INTRAVENOUS
Status: CANCELLED
Start: 2024-06-26

## 2024-06-25 RX ADMIN — METHYLPREDNISOLONE SODIUM SUCCINATE 20 MG: 40 INJECTION, POWDER, FOR SOLUTION INTRAMUSCULAR; INTRAVENOUS at 12:24

## 2024-06-25 RX ADMIN — HUMAN IMMUNOGLOBULIN G 35 G: 20 LIQUID INTRAVENOUS at 12:35

## 2024-06-25 NOTE — PROGRESS NOTES
Infusion Nursing Note:  Tricia Sosa presents today for IVIG.    Patient seen by provider today: No   present during visit today: Not Applicable.    Note: Pre-medicated with solu-medrol. Patient tolerated infusion initiated at 0.5ml/kg/hr, increased by 0.5ml/kg/hr every 15 minutes, with a max rate of 4ml/kg/hr.       Intravenous Access:  Peripheral IV placed.    Treatment Conditions:  Patient denies feeling ill, fevers, signs of infection, antibiotics, productive cough, or recent surgery. Slightly elevated temp today 99.0 F. OK per CHELSEA Abdi. Patient denies fevers at home and otherwise feeling well. Has spinal tap scheduled next week, no contraindication with IVIG today per pharmacy.      Post Infusion Assessment:  Patient tolerated infusion without incident.  Blood return noted pre and post infusion.  Site patent and intact, free from redness, edema or discomfort.  No evidence of extravasations.  Access discontinued per protocol.         Discharge Plan:   Discharge instructions reviewed with: Patient.  Patient and/or family verbalized understanding of discharge instructions and all questions answered.  AVS to patient via [employer].  Patient will return 7/22 for next appointment.   Patient discharged in stable condition accompanied by: self.  Departure Mode: Ambulatory.      Pari Griffin RN

## 2024-06-28 ENCOUNTER — TELEPHONE (OUTPATIENT)
Dept: INTERVENTIONAL RADIOLOGY/VASCULAR | Facility: CLINIC | Age: 84
End: 2024-06-28
Payer: MEDICARE

## 2024-06-28 DIAGNOSIS — G62.9 POLYNEUROPATHY: Primary | ICD-10-CM

## 2024-06-28 NOTE — TELEPHONE ENCOUNTER
Called patient regarding appointment  on 7/3/24. Pt instructed to arrive 15 minutes prior to procedure. Pt not on blood thinners. No medications to be held. Allergies reviewed. No infections or antibiotics. Pt verbalized understanding of instructions.

## 2024-07-01 ENCOUNTER — TRANSFERRED RECORDS (OUTPATIENT)
Dept: HEALTH INFORMATION MANAGEMENT | Facility: CLINIC | Age: 84
End: 2024-07-01
Payer: MEDICARE

## 2024-07-03 ENCOUNTER — HOSPITAL ENCOUNTER (OUTPATIENT)
Dept: GENERAL RADIOLOGY | Facility: CLINIC | Age: 84
Discharge: HOME | End: 2024-07-03
Attending: PSYCHIATRY & NEUROLOGY | Admitting: PSYCHIATRY & NEUROLOGY
Payer: MEDICARE

## 2024-07-03 VITALS — DIASTOLIC BLOOD PRESSURE: 49 MMHG | SYSTOLIC BLOOD PRESSURE: 123 MMHG | HEART RATE: 69 BPM

## 2024-07-03 DIAGNOSIS — G62.9 POLYNEUROPATHY: ICD-10-CM

## 2024-07-03 LAB
APPEARANCE CSF: CLEAR
COLOR CSF: COLORLESS
GLUCOSE CSF-MCNC: 49 MG/DL (ref 40–70)
PROT CSF-MCNC: 40.2 MG/DL (ref 15–45)
RBC # CSF MANUAL: 0 /UL (ref 0–2)
TUBE # CSF: 4
WBC # CSF MANUAL: 2 /UL (ref 0–5)

## 2024-07-03 PROCEDURE — 84157 ASSAY OF PROTEIN OTHER: CPT | Performed by: PSYCHIATRY & NEUROLOGY

## 2024-07-03 PROCEDURE — 62328 DX LMBR SPI PNXR W/FLUOR/CT: CPT

## 2024-07-03 PROCEDURE — 82164 ANGIOTENSIN I ENZYME TEST: CPT | Performed by: PSYCHIATRY & NEUROLOGY

## 2024-07-03 PROCEDURE — 89050 BODY FLUID CELL COUNT: CPT | Performed by: PSYCHIATRY & NEUROLOGY

## 2024-07-03 PROCEDURE — 87798 DETECT AGENT NOS DNA AMP: CPT | Performed by: PSYCHIATRY & NEUROLOGY

## 2024-07-03 PROCEDURE — 87116 MYCOBACTERIA CULTURE: CPT | Performed by: PSYCHIATRY & NEUROLOGY

## 2024-07-03 PROCEDURE — 86617 LYME DISEASE ANTIBODY: CPT | Performed by: PSYCHIATRY & NEUROLOGY

## 2024-07-03 PROCEDURE — 87206 SMEAR FLUORESCENT/ACID STAI: CPT | Performed by: PSYCHIATRY & NEUROLOGY

## 2024-07-03 PROCEDURE — 83916 OLIGOCLONAL BANDS: CPT | Performed by: PSYCHIATRY & NEUROLOGY

## 2024-07-03 PROCEDURE — 82945 GLUCOSE OTHER FLUID: CPT | Performed by: PSYCHIATRY & NEUROLOGY

## 2024-07-03 PROCEDURE — 87476 LYME DIS DNA AMP PROBE: CPT | Performed by: PSYCHIATRY & NEUROLOGY

## 2024-07-03 PROCEDURE — 88108 CYTOPATH CONCENTRATE TECH: CPT | Mod: TC | Performed by: PSYCHIATRY & NEUROLOGY

## 2024-07-03 PROCEDURE — 36415 COLL VENOUS BLD VENIPUNCTURE: CPT | Performed by: PSYCHIATRY & NEUROLOGY

## 2024-07-03 PROCEDURE — 250N000009 HC RX 250: Performed by: PHYSICIAN ASSISTANT

## 2024-07-03 RX ADMIN — LIDOCAINE HYDROCHLORIDE 5 ML: 10 INJECTION, SOLUTION EPIDURAL; INFILTRATION; INTRACAUDAL; PERINEURAL at 14:14

## 2024-07-03 NOTE — PROGRESS NOTES
Called pt to give verbal instructions as we forgot to give he copy on discharge. She verified understanding and took notes, also provided RN line phone number.

## 2024-07-03 NOTE — PROGRESS NOTES
Pt was here today for a Lumbar Puncture under fluoroscopy guidance. Procedure performed by KAYLI TRAN. Procedure went well and was tolerated well and CSF and blood were removed and sent to lab for testing. Pt recovered in radiology for 1 hour and remained stable. Pt was discharged in satisfactory condition and was driven home by son.

## 2024-07-05 LAB
ACE CSF-CCNC: 3.3 U/L
ALB CSF/SERPL: 7.6 RATIO
ALBUMIN CSF-MCNC: 29 MG/DL
ALBUMIN SERPL-MCNC: 3799 MG/DL
IGG CSF-MCNC: 2.8 MG/DL
IGG SERPL-MCNC: 752 MG/DL
IGG SYNTH RATE SER+CSF CALC-MRATE: <0 MG/D
IGG/ALB CLEAR SER+CSF-RTO: 0.49 RATIO
IGG/ALB CSF: 0.1 RATIO
OLIGOCLONAL BANDS CSF ELPH-IMP: ABNORMAL
OLIGOCLONAL BANDS CSF ELPH-IMP: NEGATIVE
OLIGOCLONAL BANDS CSF IEF: 0 BANDS

## 2024-07-06 LAB
B BURGDOR IGG CSF QL IB: NEGATIVE
B BURGDOR IGM CSF QL IB: NEGATIVE
EBV DNA SPEC QL NAA+PROBE: NOT DETECTED

## 2024-07-07 LAB — B BURGDOR DNA SPEC QL NAA+PROBE: NOT DETECTED

## 2024-07-08 ENCOUNTER — NURSE TRIAGE (OUTPATIENT)
Dept: INTERNAL MEDICINE | Facility: CLINIC | Age: 84
End: 2024-07-08
Payer: MEDICARE

## 2024-07-08 LAB
PATH REPORT.COMMENTS IMP SPEC: NORMAL
PATH REPORT.FINAL DX SPEC: NORMAL
PATH REPORT.GROSS SPEC: NORMAL
PATH REPORT.MICROSCOPIC SPEC OTHER STN: NORMAL
PATH REPORT.RELEVANT HX SPEC: NORMAL

## 2024-07-08 PROCEDURE — 88108 CYTOPATH CONCENTRATE TECH: CPT | Mod: 26 | Performed by: PATHOLOGY

## 2024-07-08 NOTE — TELEPHONE ENCOUNTER
Pt states she has headaches for 2 weeks or more. Only at night time.     PT fell on June 10th and hit the side of her left face, pt hit the wooden step.   Pt never went to  or didn't go anywhere for exam.     Mild pain, Feels it behind her forehead.   She states it very mild, only at night. Doesn't take anything for it.   Had subdural hematoma 10 years ago from a fall.   Not getting worse, it is the same every night. No pain during the day, feels fine now.     Pt doesn't have a ride today. Scheduled with Cindy tomorrow, pt saw Cindy in the past. She is scheduled to establish care with Katya in August.     Reason for Disposition   New headache and age > 50    Additional Information   Negative: Difficult to awaken or acting confused (e.g., disoriented, slurred speech)   Negative: Weakness of the face, arm or leg on one side of the body and new-onset   Negative: Numbness of the face, arm or leg on one side of the body and new-onset   Negative: Loss of speech or garbled speech and new-onset   Negative: Passed out (i.e., fainted, collapsed and was not responding)   Negative: Sounds like a life-threatening emergency to the triager   Negative: Followed a head injury within last 3 days   Negative: Traumatic Brain Injury (TBI) is suspected   Negative: Sinus pain of forehead and yellow or green nasal discharge   Negative: Pregnant   Negative: Unable to walk without falling   Negative: Stiff neck (can't touch chin to chest)   Negative: Possibility of carbon monoxide exposure   Negative: SEVERE headache, states 'worst headache' of life   Negative: SEVERE headache, sudden-onset (i.e., reaching maximum intensity within seconds to 1 hour)   Negative: Severe pain in one eye   Negative: Loss of vision or double vision  (Exception: Same as prior migraines.)   Negative: Patient sounds very sick or weak to the triager   Negative: Fever > 103 F (39.4 C)   Negative: Fever > 100.0 F (37.8 C) and has diabetes mellitus or a weak immune  system (e.g., HIV positive, cancer chemotherapy, organ transplant, splenectomy, chronic steroids)   Negative: SEVERE headache (e.g., excruciating) and has had severe headaches before   Negative: SEVERE headache and not relieved by pain meds   Negative: SEVERE headache and vomiting   Negative: SEVERE headache and fever   Negative: New headache and weak immune system (e.g., HIV positive, cancer chemo, splenectomy, organ transplant, chronic steroids)   Negative: Fever present > 3 days (72 hours)   Negative: Patient wants to be seen   Negative: Unexplained headache that is present > 24 hours    Protocols used: Headache-A-OH

## 2024-07-09 ENCOUNTER — OFFICE VISIT (OUTPATIENT)
Dept: INTERNAL MEDICINE | Facility: CLINIC | Age: 84
End: 2024-07-09
Payer: MEDICARE

## 2024-07-09 VITALS
WEIGHT: 143.6 LBS | TEMPERATURE: 98.3 F | RESPIRATION RATE: 16 BRPM | DIASTOLIC BLOOD PRESSURE: 69 MMHG | BODY MASS INDEX: 20.1 KG/M2 | HEIGHT: 71 IN | HEART RATE: 70 BPM | SYSTOLIC BLOOD PRESSURE: 126 MMHG | OXYGEN SATURATION: 99 %

## 2024-07-09 DIAGNOSIS — Z86.79 HISTORY OF SUBDURAL HEMATOMA: Primary | ICD-10-CM

## 2024-07-09 DIAGNOSIS — G44.209 TENSION HEADACHE: ICD-10-CM

## 2024-07-09 PROCEDURE — 99214 OFFICE O/P EST MOD 30 MIN: CPT

## 2024-07-09 RX ORDER — RESPIRATORY SYNCYTIAL VIRUS VACCINE 120MCG/0.5
0.5 KIT INTRAMUSCULAR ONCE
Qty: 1 EACH | Refills: 0 | Status: CANCELLED | OUTPATIENT
Start: 2024-07-09 | End: 2024-07-09

## 2024-07-09 ASSESSMENT — ENCOUNTER SYMPTOMS: HEADACHES: 1

## 2024-07-09 ASSESSMENT — PAIN SCALES - GENERAL: PAINLEVEL: NO PAIN (0)

## 2024-07-09 NOTE — PROGRESS NOTES
Assessment & Plan     (Z86.79) History of subdural hematoma  (primary encounter diagnosis)  Comment: Patient presents to the clinic with complaint of headache daily for the last 2 weeks. Previously has had these symptoms and found out she had a subdural hematoma. She would like to make sure she does not have a subdural hematoma. As she states the headaches are very dull and only occur when she wakes up at night and neuro exam is all WNL, there is a low likelihood this is a subdural hematoma or subarachnoid hemorrhage.   Plan: Continue to monitor and if persistent, will order brain MRI     (G44.209) Tension headache  Comment: Please see above   Plan: continue to monitor.         30 minutes spent by me on the date of the encounter doing chart review, review of test results, interpretation of tests, patient visit, and documentation           Subjective   Tricia is a 83 year old, presenting for the following health issues:   Patient has had a mild headache every night when she wakes up and uses the bathroom.   She has not had any changes to vision.   Taking nothing over the counter to help because they aren't bad enough.   But she is concerned she has a subdural hematoma like she had 10 years ago.       Headache (Pt c/o mild headaches x2 weeks since a hard fall she had; has hx of subdural hematoma 10 years ago and she says her sx are similar)        7/9/2024     1:29 PM   Additional Questions   Roomed by Anabell VILLALOBOS   Accompanied by n/a     Headache     History of Present Illness       Reason for visit:  I had a bad fall.  I am concerned I might have a brain bleed.  I had a subdural hematoma ten years ago after a similar fall and I am having mild headaches like I did following the previous fall. .  Symptom onset:  1-2 weeks ago  Symptoms include:  Mild headache during the night  Symptom intensity:  Mild  Symptom progression:  Staying the same  Had these symptoms before:  Yes  Has tried/received treatment for these  "symptoms:  Yes  Previous treatment was successful:  Yes  Prior treatment description:  Gabapentin for the subdural hematoma.  Nothing for headaches.    She eats 2-3 servings of fruits and vegetables daily.She consumes 0 sweetened beverage(s) daily.She exercises with enough effort to increase her heart rate 9 or less minutes per day.  She exercises with enough effort to increase her heart rate 3 or less days per week. She is missing 1 dose(s) of medications per week.  She is not taking prescribed medications regularly due to remembering to take.                 Review of Systems  Constitutional, HEENT, cardiovascular, pulmonary, gi and gu systems are negative, except as otherwise noted.      Objective    /69 (BP Location: Left arm, Cuff Size: Adult Regular)   Pulse 70   Temp 98.3  F (36.8  C) (Tympanic)   Resp 16   Ht 1.81 m (5' 11.25\")   Wt 65.1 kg (143 lb 9.6 oz)   LMP  (LMP Unknown)   SpO2 99%   BMI 19.89 kg/m    Body mass index is 19.89 kg/m .  Physical Exam   GENERAL: alert and no distress  RESP: lungs clear to auscultation - no rales, rhonchi or wheezes  CV: regular rate and rhythm, normal S1 S2, no S3 or S4, no murmur, click or rub, no peripheral edema  MS: no gross musculoskeletal defects noted, no edema  NEURO: Normal strength and tone, mentation intact and speech normal            Signed Electronically by: SHANIQUE Aldana CNP    "

## 2024-07-22 ENCOUNTER — ONCOLOGY VISIT (OUTPATIENT)
Dept: ONCOLOGY | Facility: CLINIC | Age: 84
End: 2024-07-22
Attending: INTERNAL MEDICINE
Payer: MEDICARE

## 2024-07-22 ENCOUNTER — LAB (OUTPATIENT)
Dept: INFUSION THERAPY | Facility: CLINIC | Age: 84
End: 2024-07-22
Attending: INTERNAL MEDICINE
Payer: MEDICARE

## 2024-07-22 VITALS
WEIGHT: 143 LBS | TEMPERATURE: 98.6 F | SYSTOLIC BLOOD PRESSURE: 133 MMHG | BODY MASS INDEX: 20.02 KG/M2 | HEART RATE: 86 BPM | DIASTOLIC BLOOD PRESSURE: 67 MMHG | RESPIRATION RATE: 16 BRPM | HEIGHT: 71 IN | OXYGEN SATURATION: 96 %

## 2024-07-22 DIAGNOSIS — D59.10 AUTOIMMUNE HEMOLYTIC ANEMIA (H): ICD-10-CM

## 2024-07-22 DIAGNOSIS — D83.9 CVID (COMMON VARIABLE IMMUNODEFICIENCY) (H): ICD-10-CM

## 2024-07-22 DIAGNOSIS — D59.19 OTHER AUTOIMMUNE HEMOLYTIC ANEMIA (H): Primary | ICD-10-CM

## 2024-07-22 DIAGNOSIS — D59.10 AUTOIMMUNE HEMOLYTIC ANEMIA (H): Primary | ICD-10-CM

## 2024-07-22 LAB
ALBUMIN SERPL BCG-MCNC: 4.2 G/DL (ref 3.5–5.2)
ALP SERPL-CCNC: 118 U/L (ref 40–150)
ALT SERPL W P-5'-P-CCNC: 12 U/L (ref 0–50)
ANION GAP SERPL CALCULATED.3IONS-SCNC: 12 MMOL/L (ref 7–15)
AST SERPL W P-5'-P-CCNC: 28 U/L (ref 0–45)
BASOPHILS # BLD AUTO: 0 10E3/UL (ref 0–0.2)
BASOPHILS NFR BLD AUTO: 0 %
BILIRUB SERPL-MCNC: 0.5 MG/DL
BUN SERPL-MCNC: 14.6 MG/DL (ref 8–23)
CALCIUM SERPL-MCNC: 8.3 MG/DL (ref 8.8–10.4)
CHLORIDE SERPL-SCNC: 103 MMOL/L (ref 98–107)
CREAT SERPL-MCNC: 0.56 MG/DL (ref 0.51–0.95)
EGFRCR SERPLBLD CKD-EPI 2021: 90 ML/MIN/1.73M2
EOSINOPHIL # BLD AUTO: 0 10E3/UL (ref 0–0.7)
EOSINOPHIL NFR BLD AUTO: 0 %
ERYTHROCYTE [DISTWIDTH] IN BLOOD BY AUTOMATED COUNT: 13.7 % (ref 10–15)
GLUCOSE SERPL-MCNC: 121 MG/DL (ref 70–99)
HCO3 SERPL-SCNC: 22 MMOL/L (ref 22–29)
HCT VFR BLD AUTO: 30.9 % (ref 35–47)
HGB BLD-MCNC: 10.5 G/DL (ref 11.7–15.7)
IMM GRANULOCYTES # BLD: 0 10E3/UL
IMM GRANULOCYTES NFR BLD: 0 %
LYMPHOCYTES # BLD AUTO: 2.3 10E3/UL (ref 0.8–5.3)
LYMPHOCYTES NFR BLD AUTO: 50 %
MCH RBC QN AUTO: 36.3 PG (ref 26.5–33)
MCHC RBC AUTO-ENTMCNC: 34 G/DL (ref 31.5–36.5)
MCV RBC AUTO: 107 FL (ref 78–100)
MONOCYTES # BLD AUTO: 0.4 10E3/UL (ref 0–1.3)
MONOCYTES NFR BLD AUTO: 10 %
NEUTROPHILS # BLD AUTO: 1.7 10E3/UL (ref 1.6–8.3)
NEUTROPHILS NFR BLD AUTO: 39 %
NRBC # BLD AUTO: 0 10E3/UL
NRBC BLD AUTO-RTO: 0 /100
PLATELET # BLD AUTO: 141 10E3/UL (ref 150–450)
POTASSIUM SERPL-SCNC: 4.1 MMOL/L (ref 3.4–5.3)
PROT SERPL-MCNC: 5.8 G/DL (ref 6.4–8.3)
RBC # BLD AUTO: 2.89 10E6/UL (ref 3.8–5.2)
SODIUM SERPL-SCNC: 137 MMOL/L (ref 135–145)
WBC # BLD AUTO: 4.5 10E3/UL (ref 4–11)

## 2024-07-22 PROCEDURE — 250N000013 HC RX MED GY IP 250 OP 250 PS 637: Performed by: INTERNAL MEDICINE

## 2024-07-22 PROCEDURE — G0463 HOSPITAL OUTPT CLINIC VISIT: HCPCS | Performed by: INTERNAL MEDICINE

## 2024-07-22 PROCEDURE — 82784 ASSAY IGA/IGD/IGG/IGM EACH: CPT | Performed by: PHYSICIAN ASSISTANT

## 2024-07-22 PROCEDURE — 250N000011 HC RX IP 250 OP 636: Performed by: INTERNAL MEDICINE

## 2024-07-22 PROCEDURE — 36415 COLL VENOUS BLD VENIPUNCTURE: CPT

## 2024-07-22 PROCEDURE — 82607 VITAMIN B-12: CPT | Performed by: INTERNAL MEDICINE

## 2024-07-22 PROCEDURE — 258N000003 HC RX IP 258 OP 636: Performed by: INTERNAL MEDICINE

## 2024-07-22 PROCEDURE — 96413 CHEMO IV INFUSION 1 HR: CPT

## 2024-07-22 PROCEDURE — 96415 CHEMO IV INFUSION ADDL HR: CPT

## 2024-07-22 PROCEDURE — 85041 AUTOMATED RBC COUNT: CPT | Performed by: INTERNAL MEDICINE

## 2024-07-22 PROCEDURE — 99214 OFFICE O/P EST MOD 30 MIN: CPT | Performed by: INTERNAL MEDICINE

## 2024-07-22 PROCEDURE — 82040 ASSAY OF SERUM ALBUMIN: CPT | Performed by: PHYSICIAN ASSISTANT

## 2024-07-22 RX ORDER — DIPHENHYDRAMINE HYDROCHLORIDE 50 MG/ML
50 INJECTION INTRAMUSCULAR; INTRAVENOUS
Status: CANCELLED
Start: 2024-07-22

## 2024-07-22 RX ORDER — NALOXONE HYDROCHLORIDE 0.4 MG/ML
0.2 INJECTION, SOLUTION INTRAMUSCULAR; INTRAVENOUS; SUBCUTANEOUS
Status: CANCELLED | OUTPATIENT
Start: 2024-07-22

## 2024-07-22 RX ORDER — DIPHENHYDRAMINE HCL 25 MG
50 CAPSULE ORAL ONCE
Status: CANCELLED | OUTPATIENT
Start: 2024-07-22

## 2024-07-22 RX ORDER — ACETAMINOPHEN 325 MG/1
650 TABLET ORAL ONCE
Status: COMPLETED | OUTPATIENT
Start: 2024-07-22 | End: 2024-07-22

## 2024-07-22 RX ORDER — MEPERIDINE HYDROCHLORIDE 25 MG/ML
25 INJECTION INTRAMUSCULAR; INTRAVENOUS; SUBCUTANEOUS EVERY 30 MIN PRN
Status: CANCELLED | OUTPATIENT
Start: 2024-07-22

## 2024-07-22 RX ORDER — ALBUTEROL SULFATE 90 UG/1
1-2 AEROSOL, METERED RESPIRATORY (INHALATION)
Status: CANCELLED
Start: 2024-07-22

## 2024-07-22 RX ORDER — HEPARIN SODIUM (PORCINE) LOCK FLUSH IV SOLN 100 UNIT/ML 100 UNIT/ML
5 SOLUTION INTRAVENOUS
Status: CANCELLED | OUTPATIENT
Start: 2024-07-22

## 2024-07-22 RX ORDER — HEPARIN SODIUM,PORCINE 10 UNIT/ML
5 VIAL (ML) INTRAVENOUS
Status: CANCELLED | OUTPATIENT
Start: 2024-07-22

## 2024-07-22 RX ORDER — DIPHENHYDRAMINE HCL 25 MG
50 CAPSULE ORAL ONCE
Status: COMPLETED | OUTPATIENT
Start: 2024-07-22 | End: 2024-07-22

## 2024-07-22 RX ORDER — ACETAMINOPHEN 325 MG/1
650 TABLET ORAL ONCE
Status: CANCELLED | OUTPATIENT
Start: 2024-07-22

## 2024-07-22 RX ORDER — MEPERIDINE HYDROCHLORIDE 25 MG/ML
25 INJECTION INTRAMUSCULAR; INTRAVENOUS; SUBCUTANEOUS
Status: CANCELLED
Start: 2024-07-22

## 2024-07-22 RX ORDER — METHYLPREDNISOLONE SODIUM SUCCINATE 125 MG/2ML
125 INJECTION, POWDER, LYOPHILIZED, FOR SOLUTION INTRAMUSCULAR; INTRAVENOUS
Status: CANCELLED
Start: 2024-07-22

## 2024-07-22 RX ORDER — ALBUTEROL SULFATE 0.83 MG/ML
2.5 SOLUTION RESPIRATORY (INHALATION)
Status: CANCELLED | OUTPATIENT
Start: 2024-07-22

## 2024-07-22 RX ORDER — EPINEPHRINE 1 MG/ML
0.3 INJECTION, SOLUTION INTRAMUSCULAR; SUBCUTANEOUS EVERY 5 MIN PRN
Status: CANCELLED | OUTPATIENT
Start: 2024-07-22

## 2024-07-22 RX ADMIN — RITUXIMAB-ABBS 700 MG: 10 INJECTION, SOLUTION INTRAVENOUS at 14:49

## 2024-07-22 RX ADMIN — SODIUM CHLORIDE 250 ML: 9 INJECTION, SOLUTION INTRAVENOUS at 14:49

## 2024-07-22 RX ADMIN — DIPHENHYDRAMINE HYDROCHLORIDE 25 MG: 25 CAPSULE ORAL at 14:36

## 2024-07-22 RX ADMIN — ACETAMINOPHEN 325 MG: 325 TABLET ORAL at 14:36

## 2024-07-22 ASSESSMENT — ENCOUNTER SYMPTOMS
EXTREMITY WEAKNESS: 1
SLEEP DISTURBANCE: 1
GASTROINTESTINAL NEGATIVE: 1
ARTHRALGIAS: 0
BACK PAIN: 0
DIZZINESS: 1
RESPIRATORY NEGATIVE: 1
FATIGUE: 0
UNEXPECTED WEIGHT CHANGE: 1
HEMATOLOGIC/LYMPHATIC NEGATIVE: 1
EYES NEGATIVE: 1
CARDIOVASCULAR NEGATIVE: 1
APPETITE CHANGE: 1

## 2024-07-22 ASSESSMENT — PAIN SCALES - GENERAL: PAINLEVEL: NO PAIN (0)

## 2024-07-22 NOTE — PROGRESS NOTES
Infusion Nursing Note:  Tricia Sosa presents today for Rituxan.    Patient seen by provider today: Yes: Dr Ramos   present during visit today: Not Applicable.    Note: Tricia is feeling stable. Denies recent fevers/cough/chills/SOB/chest pain.     Intravenous Access:  Labs drawn without difficulty by FT RN.  Peripheral IV placed by FT RN.    Treatment Conditions:  Lab Results   Component Value Date    HGB 10.5 (L) 07/22/2024    WBC 4.5 07/22/2024    ANEU 6.8 07/06/2021    ANEUTAUTO 1.7 07/22/2024     (L) 07/22/2024     Results reviewed, labs MET treatment parameters, ok to proceed with treatment.    Post Infusion Assessment:  Patient tolerated infusion without incident.  Blood return noted pre and post infusion.  Site patent and intact, free from redness, edema or discomfort.  No evidence of extravasations.  Access discontinued per protocol.     Discharge Plan:   Discharge instructions reviewed with: Patient.  Patient and/or family verbalized understanding of discharge instructions and all questions answered.  AVS to patient via [employer].  Patient will return 8/19 for next appointment.   Patient discharged in stable condition accompanied by: self.  Departure Mode: Ambulatory.    Sharan Schaefer RN

## 2024-07-22 NOTE — PROGRESS NOTES
Assessment & Plan   IgA deficiency  Macrocytosis with prior B12 deficiency  Autoimmune hemolytic anemia  Common Variable Immunodeficiency  Lower extremity weakness    Recheck B12 level  Rituximab today  Will likely need IV IgG next month  Next provider visit ~2 months    Interval History  This is a scheduled follow up visit for this lad who previously has been seen by the [facility] () and more recently by Domi Matos, Melina, and Aleksey for CVID, IgA deficiency and AIHA.  She's currently getting monthly rituximab (which controls her AIHA) and every two months IV IgG.  Her last hemolytic episode was a year ago.    Her big problem currently is worsening weakness of both legs.  She needs to push with her arms to stand up and has had multiple falls, including one last month that lead to the death of the family pet.    She has had some cystic pancreatic changes on scanning that concern her because her brother  from pancreas cancer.    ECOG = 1-2    Patient Active Problem List   Diagnosis    Lymphocytic colitis    Acquired hypothyroidism    CVID (common variable immunodeficiency) (H)    B12 deficiency    CARDIOVASCULAR SCREENING; LDL GOAL LESS THAN 130    Other autoimmune hemolytic anemia (H)    Right-sided low back pain with right-sided sciatica, unspecified chronicity    Autoimmune hemolytic anemia (H)    Bronchiectasis (H)    Mild obstructive sleep apnea    Selective deficiency of IgG subclasses (H)    Anemia, iron deficiency    Mild protein-calorie malnutrition (H24)    Ataxia, unspecified    DDD (degenerative disc disease), lumbar    Diarrhea    Other acquired deformities of left foot    Other acquired deformities of right foot    Other amnesia    Other disturbances of skin sensation     Current Outpatient Medications   Medication Sig Dispense Refill    cyanocobalamin (VITAMIN  B-12) 1000 MCG tablet   3    folic acid (FOLVITE) 1 MG tablet   3    ketoconazole (NIZORAL) 2 % external shampoo Use every other day to  scalp as needed 120 mL 0    levothyroxine (SYNTHROID/LEVOTHROID) 150 MCG tablet Take one tablet  by mouth daily 90 tablet 0    omega 3 1000 MG CAPS Take 1,280 mg by mouth daily      triamcinolone (KENALOG) 0.1 % external ointment Apply topically 2 times daily       No current facility-administered medications for this visit.     Facility-Administered Medications Ordered in Other Visits   Medication Dose Route Frequency Provider Last Rate Last Admin    sodium chloride (PF) 0.9% PF flush 10 mL  10 mL Intracatheter Q8H Fely Ramos MD   10 mL at 07/22/24 1300     Past Medical History:   Diagnosis Date    Arthritis     WARD (dyspnea on exertion)     External hemorrhoids without mention of complication 06/27/2005    Hemolytic anemia (H24)     autoimmune    Hypothyroidism     IgA deficiency (H)     Myopia of both eyes with astigmatism and presbyopia 08/16/2017    Other malaise and fatigue     Other severe protein-calorie malnutrition     Other vitreous opacities 12/19/2006    Sleep apnea     Thyroid disease     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    Hemorrhage Subdural Trauma Without LOC Active    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, and posterior vitreous detachment    Urinary tract infection, site not specified     Recurrent UTI's     Past Surgical History:   Procedure Laterality Date    BIOPSY MUSCLE DIAGNOSTIC (LOCATION) Right 1/16/2024    Procedure: BIOPSY, MUSCLE - RIGHT THIGH;  Surgeon: Dickson Madrid MD;  Location:  OR    COLONOSCOPY N/A 4/26/2016    Procedure: COLONOSCOPY;  Surgeon: Dontae Del Rio MD;  Location:  GI    COLONOSCOPY N/A 2/22/2024    Procedure: Colonoscopy with polypectomy by using cold snare and two hemoclips applied;  Surgeon: Dontae Del Rio MD;  Location:  GI    ENT SURGERY  1985    Thyroid lobectomy    ESOPHAGOSCOPY, GASTROSCOPY, DUODENOSCOPY (EGD), COMBINED N/A 2/22/2024    Procedure: ESOPHAGOGASTRODUODENOSCOPY, WITH  "BIOPSY by using cold forcep;  Surgeon: Dontae Del Rio MD;  Location:  GI    EYE SURGERY Bilateral 04/20/2021    Cataract Surgery    HC CYSTOSCOPY,INSERT URETERAL STENT      Ureteral stent insertion    HC FLEX SIGMOIDOSCOPY W BIOPSY  5/30/00    HC LAPAROSCOPY, SURGICAL; CHOLECYSTECTOMY  1992     THYROIDECTOMY      LIGATION OF HEMORRHOID(S)      Hemorrhoidectomy    UPPER GI ENDOSCOPY,BIOPSY  10/01/01    ZZC LIGATE FALLOPIAN TUBE      ZZHC COLONOSCOPY W BIOPSY  4/26/00    ZZHC COLONOSCOPY W BIOPSY  10/8/03    ZZHC COLONOSCOPY W BIOPSY  6/27/05    REPEAT IN 5 YEARS     Socioeconomic History    Marital status:      Social Determinants of Health     Interpersonal Safety: Low Risk  (7/9/2024)    Interpersonal Safety     Do you feel physically and emotionally safe where you currently live?: Yes     Within the past 12 months, have you been hit, slapped, kicked or otherwise physically hurt by someone?: No     Within the past 12 months, have you been humiliated or emotionally abused in other ways by your partner or ex-partner?: No     Review of Systems   Constitutional:  Positive for appetite change and unexpected weight change. Negative for fatigue.   HENT:  Negative.     Eyes: Negative.    Respiratory: Negative.     Cardiovascular: Negative.    Gastrointestinal: Negative.    Endocrine:        Thyroidectomy ~40 years ago for goiter   Genitourinary: Negative.     Musculoskeletal:  Positive for gait problem. Negative for arthralgias and back pain.   Skin: Negative.    Neurological:  Positive for dizziness, extremity weakness and gait problem.   Hematological: Negative.    Psychiatric/Behavioral:  Positive for sleep disturbance.        /67   Pulse 86   Temp 98.6  F (37  C) (Tympanic)   Resp 16   Ht 1.81 m (5' 11.25\")   Wt 64.9 kg (143 lb)   LMP  (LMP Unknown)   SpO2 96%   BMI 19.80 kg/m      Physical Exam  Vitals and nursing note reviewed.   Constitutional:       Appearance: She is well-groomed and " underweight.      Comments: Masked white woman, tearful at times   HENT:      Head: Normocephalic and atraumatic.      Mouth/Throat:      Mouth: Mucous membranes are moist.      Dentition: Abnormal dentition (missing some molars). No dental caries.   Eyes:      Extraocular Movements: Extraocular movements intact.      Conjunctiva/sclera: Conjunctivae normal.      Pupils: Pupils are equal, round, and reactive to light.   Neck:        Comments: Thyroidectomy scar and sebaceous cyst  Cardiovascular:      Rate and Rhythm: Normal rate and regular rhythm.      Pulses: Normal pulses.      Heart sounds: Normal heart sounds.   Pulmonary:      Effort: Pulmonary effort is normal.      Breath sounds: Normal breath sounds.   Abdominal:      General: There is no distension.      Palpations: Abdomen is soft. There is no mass.      Tenderness: There is no abdominal tenderness. There is no guarding.   Musculoskeletal:         General: No swelling.      Cervical back: Normal range of motion and neck supple.      Comments: Sarcopenic, especially legs, symmetrically   Lymphadenopathy:      Cervical: No cervical adenopathy.      Upper Body:      Right upper body: No axillary or epitrochlear adenopathy.      Left upper body: No axillary or epitrochlear adenopathy.   Skin:     General: Skin is warm and dry.   Neurological:      Mental Status: She is alert and oriented to person, place, and time.      Cranial Nerves: No cranial nerve deficit.      Motor: Weakness present.      Gait: Gait abnormal.      Deep Tendon Reflexes: Reflexes abnormal.   Psychiatric:         Mood and Affect: Mood normal.         Behavior: Behavior normal. Behavior is cooperative.         Thought Content: Thought content normal.         Judgment: Judgment normal.         Recent Results (from the past 720 hour(s))   Oligoclonal Banding:    Collection Time: 07/03/24  2:17 PM   Result Value Ref Range    CSF Oligoclonal Bands Number 0 0 - 1 Bands    Immunoglobulin Serum  IgG 752 (L) 768 - 1632 mg/dL    Oligoclonal IgG CSF 2.8 0.0 - 6.0 mg/dL    Albumin Serum 3799 3500 - 5200 mg/dL    Oligoclonal Albumin CSF 29 0 - 35 mg/dL    Oligoclonal Albumin Index 7.6 0.0 - 9.0 ratio    Oligoclonal IgG Index 0.49 0.28 - 0.66 ratio    CSF IgG/Albumin Ratio 0.10 0.09 - 0.25 ratio    Oligoclonal Banding Negative Negative    IgG Synthesis Rate <0.0 <=8.0 mg/d    Oligoclonal Interpretation See Note    Lyme IgG and IgM CSF Immunoblot:    Collection Time: 07/03/24  2:17 PM   Result Value Ref Range    Borrelia burgdorferi IgG Lenora by IB, CSF Negative Negative    Borrelia burgdorferi IgM Lenora by IB, CSF Negative Negative   Lyme disease DNA detection by PCR    Collection Time: 07/03/24  2:17 PM   Result Value Ref Range    Lyme DNAPCR Not Detected    Cytology, non-gynecologic    Collection Time: 07/03/24  2:17 PM   Result Value Ref Range    Final Diagnosis       Specimen A: Cerebrospinal fluid, lumbar puncture:     Interpretation:      Negative for malignancy.     Adequacy:     Satisfactory for evaluation.      Clinical Information       Headaches.      Gross Description       A(A). Lumbar Puncture, CSF:  Received 3 ml of clear, colorless fluid, processed as one Pap stained cytospin and one Brody stained cytospin.      Microscopic Description       Microscopic examination was performed.        Performing Labs       The technical component of this testing was completed at [facility].     Stain controls for all stains resulted within this report have been reviewed and show appropriate reactivity.      Radha Barr Virus Qualitative PCR    Collection Time: 07/03/24  2:17 PM    Specimen: Lumbar Puncture; Cerebrospinal fluid   Result Value Ref Range    EBV Qualitative PCR Not Detected    Angiotensin Converting Enzyme CSF:    Collection Time: 07/03/24  2:17 PM   Result Value Ref Range    ACE Angiotensin Conv Enz CSF 3.3 (H) 0.0 - 2.5 U/L    Protein total CSF:    Collection Time: 07/03/24  2:17 PM   Result Value Ref Range    Protein total CSF 40.2 15.0 - 45.0 mg/dL   Glucose CSF:    Collection Time: 07/03/24  2:17 PM   Result Value Ref Range    Glucose CSF 49 40 - 70 mg/dL   Cell Count CSF    Collection Time: 07/03/24  2:17 PM   Result Value Ref Range    Tube Number 4     Color Colorless Colorless    Clarity Clear Clear    Total Nucleated Cells 2 0 - 5 /uL    RBC Count 0 0 - 2 /uL   Acid-Fast Bacilli Culture and Stain    Collection Time: 07/03/24  2:17 PM    Specimen: Lumbar Puncture; Cerebrospinal fluid   Result Value Ref Range    Acid Fast Stain      Acid Fast Stain      Acid Fast Stain     Comprehensive metabolic panel    Collection Time: 07/22/24  1:01 PM   Result Value Ref Range    Sodium 137 135 - 145 mmol/L    Potassium 4.1 3.4 - 5.3 mmol/L    Carbon Dioxide (CO2) 22 22 - 29 mmol/L    Anion Gap 12 7 - 15 mmol/L    Urea Nitrogen 14.6 8.0 - 23.0 mg/dL    Creatinine 0.56 0.51 - 0.95 mg/dL    GFR Estimate 90 >60 mL/min/1.73m2    Calcium 8.3 (L) 8.8 - 10.4 mg/dL    Chloride 103 98 - 107 mmol/L    Glucose 121 (H) 70 - 99 mg/dL    Alkaline Phosphatase 118 40 - 150 U/L    AST 28 0 - 45 U/L    ALT 12 0 - 50 U/L    Protein Total 5.8 (L) 6.4 - 8.3 g/dL    Albumin 4.2 3.5 - 5.2 g/dL    Bilirubin Total 0.5 <=1.2 mg/dL   CBC with platelets and differential    Collection Time: 07/22/24  2:07 PM   Result Value Ref Range    WBC Count 4.5 4.0 - 11.0 10e3/uL    RBC Count 2.89 (L) 3.80 - 5.20 10e6/uL    Hemoglobin 10.5 (L) 11.7 - 15.7 g/dL    Hematocrit 30.9 (L) 35.0 - 47.0 %     (H) 78 - 100 fL    MCH 36.3 (H) 26.5 - 33.0 pg    MCHC 34.0 31.5 - 36.5 g/dL    RDW 13.7 10.0 - 15.0 %    Platelet Count 141 (L) 150 - 450 10e3/uL    % Neutrophils 39 %    % Lymphocytes 50 %    % Monocytes 10 %    % Eosinophils 0 %    % Basophils 0 %    % Immature Granulocytes 0 %    NRBCs per 100 WBC 0 <1 /100    Absolute Neutrophils 1.7 1.6 - 8.3 10e3/uL    Absolute Lymphocytes  2.3 0.8 - 5.3 10e3/uL    Absolute Monocytes 0.4 0.0 - 1.3 10e3/uL    Absolute Eosinophils 0.0 0.0 - 0.7 10e3/uL    Absolute Basophils 0.0 0.0 - 0.2 10e3/uL    Absolute Immature Granulocytes 0.0 <=0.4 10e3/uL    Absolute NRBCs 0.0 10e3/uL     Recent Results (from the past 744 hour(s))   MA Screen Bilateral w/Chivo    Narrative    BILATERAL FULL FIELD DIGITAL SCREENING MAMMOGRAM WITH TOMOSYNTHESIS    Performed on: 6/24/24    Compared to: 06/23/2022, 05/25/2021, and 09/11/2019    Technique:  This study was evaluated with the assistance of Computer-Aided   Detection.  Breast Tomosynthesis was used in interpretation.    Findings: The breasts are heterogeneously dense, which may obscure small   masses.  There is no radiographic evidence of malignancy.     Impression    IMPRESSION: ACR BI-RADS Category 1: Negative    BREAST CANCER SCREENING RECOMMENDATION: Routine yearly mammography   beginning at age 40 or as discussed with your provider.    The results and recommendations of this examination will be communicated   to the patient.        Leandra Castro MD     XR Lumbar Puncture Spinal Tap Diagnostic    Narrative    EXAM: XR LUMBAR PUNCTURE SPINAL TAP DIAGNOSTIC  7/3/2024 3:06 PM       History:  Please see written for lab orders; Polyneuropathy. Bilateral  weakness. Evaluation for demyelinating disease. Significant  degenerative disc disease seen.    PROCEDURE: The patient consented for a lumbar puncture. The benefits  and risks of the procedure were explained to the patient, including  but not limited to worsening headache, hemorrhage, infection, lower  extremity pain, or nerve root injury. The patient was sterilely  prepped and draped with the patient in the supine position, over the  lower back. Under fluoroscopic guidance, the interlaminar spaces were  noted. 1% lidocaine was administered for local anesthetic over the  L3-4 interlaminar space, and a 22 gauge needle was advanced into the  thecal sac under  fluoroscopic guidance.  There was initial aspiration  of clear CSF. About 11 cc's total were aspirated.  The needle was  removed. There were no immediate complications associated with the  procedure.   Estimated blood loss during the procedure was less than 5  mL.    Opening Pressure: Normal opening pressure  Fluoro time: 0.2 minutes  Images Obtained: 2  Medications used: 3 mL 1% lidocaine      Impression    IMPRESSION: Successful lumbar puncture.    BECCA MUHAMMAD PA-C         SYSTEM ID:  [id number]

## 2024-07-22 NOTE — LETTER
2024      Tricia Sosa  [address]  [facility] [address]      Dear Colleague,    Thank you for referring your patient, Tricia Sosa, to the [facility] [place]. Please see a copy of my visit note below.    Assessment & Plan   IgA deficiency  Macrocytosis with prior B12 deficiency  Autoimmune hemolytic anemia  Common Variable Immunodeficiency  Lower extremity weakness    Recheck B12 level  Rituximab today  Will likely need IV IgG next month  Next provider visit ~2 months    Interval History  This is a scheduled follow up visit for this lad who previously has been seen by the [facility] () and more recently by Domi Matos, Melina, and Aleksey for CVID, IgA deficiency and AIHA.  She's currently getting monthly rituximab (which controls her AIHA) and every two months IV IgG.  Her last hemolytic episode was a year ago.    Her big problem currently is worsening weakness of both legs.  She needs to push with her arms to stand up and has had multiple falls, including one last month that lead to the death of the family pet.    She has had some cystic pancreatic changes on scanning that concern her because her brother  from pancreas cancer.    ECOG = 1-2    Patient Active Problem List   Diagnosis     Lymphocytic colitis     Acquired hypothyroidism     CVID (common variable immunodeficiency) (H)     B12 deficiency     CARDIOVASCULAR SCREENING; LDL GOAL LESS THAN 130     Other autoimmune hemolytic anemia (H)     Right-sided low back pain with right-sided sciatica, unspecified chronicity     Autoimmune hemolytic anemia (H)     Bronchiectasis (H)     Mild obstructive sleep apnea     Selective deficiency of IgG subclasses (H)     Anemia, iron deficiency     Mild protein-calorie malnutrition (H24)     Ataxia, unspecified     DDD (degenerative disc disease), lumbar     Diarrhea     Other acquired deformities of left foot     Other acquired deformities of right foot     Other amnesia      Other disturbances of skin sensation     Current Outpatient Medications   Medication Sig Dispense Refill     cyanocobalamin (VITAMIN  B-12) 1000 MCG tablet   3     folic acid (FOLVITE) 1 MG tablet   3     ketoconazole (NIZORAL) 2 % external shampoo Use every other day to scalp as needed 120 mL 0     levothyroxine (SYNTHROID/LEVOTHROID) 150 MCG tablet Take one tablet  by mouth daily 90 tablet 0     omega 3 1000 MG CAPS Take 1,280 mg by mouth daily       triamcinolone (KENALOG) 0.1 % external ointment Apply topically 2 times daily       No current facility-administered medications for this visit.     Facility-Administered Medications Ordered in Other Visits   Medication Dose Route Frequency Provider Last Rate Last Admin     sodium chloride (PF) 0.9% PF flush 10 mL  10 mL Intracatheter Q8H Fely Ramos MD   10 mL at 07/22/24 1300     Past Medical History:   Diagnosis Date     Arthritis      WARD (dyspnea on exertion)      External hemorrhoids without mention of complication 06/27/2005     Hemolytic anemia (H24)     autoimmune     Hypothyroidism      IgA deficiency (H)      Myopia of both eyes with astigmatism and presbyopia 08/16/2017     Other malaise and fatigue      Other severe protein-calorie malnutrition      Other vitreous opacities 12/19/2006     Sleep apnea      Thyroid disease      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    Hemorrhage Subdural Trauma Without LOC Active     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, and posterior vitreous detachment     Urinary tract infection, site not specified     Recurrent UTI's     Past Surgical History:   Procedure Laterality Date     BIOPSY MUSCLE DIAGNOSTIC (LOCATION) Right 1/16/2024    Procedure: BIOPSY, MUSCLE - RIGHT THIGH;  Surgeon: Dickson Madrid MD;  Location:  OR     COLONOSCOPY N/A 4/26/2016    Procedure: COLONOSCOPY;  Surgeon: Dontae Del Rio MD;  Location:  GI     COLONOSCOPY N/A 2/22/2024     Procedure: Colonoscopy with polypectomy by using cold snare and two hemoclips applied;  Surgeon: Dontae Del Rio MD;  Location:  GI     ENT SURGERY  1985    Thyroid lobectomy     ESOPHAGOSCOPY, GASTROSCOPY, DUODENOSCOPY (EGD), COMBINED N/A 2/22/2024    Procedure: ESOPHAGOGASTRODUODENOSCOPY, WITH BIOPSY by using cold forcep;  Surgeon: Dontae Del Rio MD;  Location:  GI     EYE SURGERY Bilateral 04/20/2021    Cataract Surgery     HC CYSTOSCOPY,INSERT URETERAL STENT      Ureteral stent insertion     HC FLEX SIGMOIDOSCOPY W BIOPSY  5/30/00     HC LAPAROSCOPY, SURGICAL; CHOLECYSTECTOMY  1992      THYROIDECTOMY       LIGATION OF HEMORRHOID(S)      Hemorrhoidectomy     UPPER GI ENDOSCOPY,BIOPSY  10/01/01     ZZC LIGATE FALLOPIAN TUBE       ZZHC COLONOSCOPY W BIOPSY  4/26/00     ZZHC COLONOSCOPY W BIOPSY  10/8/03     ZZHC COLONOSCOPY W BIOPSY  6/27/05    REPEAT IN 5 YEARS     Socioeconomic History     Marital status:      Social Determinants of Health     Interpersonal Safety: Low Risk  (7/9/2024)    Interpersonal Safety      Do you feel physically and emotionally safe where you currently live?: Yes      Within the past 12 months, have you been hit, slapped, kicked or otherwise physically hurt by someone?: No      Within the past 12 months, have you been humiliated or emotionally abused in other ways by your partner or ex-partner?: No     Review of Systems   Constitutional:  Positive for appetite change and unexpected weight change. Negative for fatigue.   HENT:  Negative.     Eyes: Negative.    Respiratory: Negative.     Cardiovascular: Negative.    Gastrointestinal: Negative.    Endocrine:        Thyroidectomy ~40 years ago for goiter   Genitourinary: Negative.     Musculoskeletal:  Positive for gait problem. Negative for arthralgias and back pain.   Skin: Negative.    Neurological:  Positive for dizziness, extremity weakness and gait problem.   Hematological: Negative.    Psychiatric/Behavioral:   "Positive for sleep disturbance.        /67   Pulse 86   Temp 98.6  F (37  C) (Tympanic)   Resp 16   Ht 1.81 m (5' 11.25\")   Wt 64.9 kg (143 lb)   LMP  (LMP Unknown)   SpO2 96%   BMI 19.80 kg/m      Physical Exam  Vitals and nursing note reviewed.   Constitutional:       Appearance: She is well-groomed and underweight.      Comments: Masked white woman, tearful at times   HENT:      Head: Normocephalic and atraumatic.      Mouth/Throat:      Mouth: Mucous membranes are moist.      Dentition: Abnormal dentition (missing some molars). No dental caries.   Eyes:      Extraocular Movements: Extraocular movements intact.      Conjunctiva/sclera: Conjunctivae normal.      Pupils: Pupils are equal, round, and reactive to light.   Neck:        Comments: Thyroidectomy scar and sebaceous cyst  Cardiovascular:      Rate and Rhythm: Normal rate and regular rhythm.      Pulses: Normal pulses.      Heart sounds: Normal heart sounds.   Pulmonary:      Effort: Pulmonary effort is normal.      Breath sounds: Normal breath sounds.   Abdominal:      General: There is no distension.      Palpations: Abdomen is soft. There is no mass.      Tenderness: There is no abdominal tenderness. There is no guarding.   Musculoskeletal:         General: No swelling.      Cervical back: Normal range of motion and neck supple.      Comments: Sarcopenic, especially legs, symmetrically   Lymphadenopathy:      Cervical: No cervical adenopathy.      Upper Body:      Right upper body: No axillary or epitrochlear adenopathy.      Left upper body: No axillary or epitrochlear adenopathy.   Skin:     General: Skin is warm and dry.   Neurological:      Mental Status: She is alert and oriented to person, place, and time.      Cranial Nerves: No cranial nerve deficit.      Motor: Weakness present.      Gait: Gait abnormal.      Deep Tendon Reflexes: Reflexes abnormal.   Psychiatric:         Mood and Affect: Mood normal.         Behavior: Behavior " normal. Behavior is cooperative.         Thought Content: Thought content normal.         Judgment: Judgment normal.         Recent Results (from the past 720 hour(s))   Oligoclonal Banding:    Collection Time: 07/03/24  2:17 PM   Result Value Ref Range    CSF Oligoclonal Bands Number 0 0 - 1 Bands    Immunoglobulin Serum IgG 752 (L) 768 - 1632 mg/dL    Oligoclonal IgG CSF 2.8 0.0 - 6.0 mg/dL    Albumin Serum 3799 3500 - 5200 mg/dL    Oligoclonal Albumin CSF 29 0 - 35 mg/dL    Oligoclonal Albumin Index 7.6 0.0 - 9.0 ratio    Oligoclonal IgG Index 0.49 0.28 - 0.66 ratio    CSF IgG/Albumin Ratio 0.10 0.09 - 0.25 ratio    Oligoclonal Banding Negative Negative    IgG Synthesis Rate <0.0 <=8.0 mg/d    Oligoclonal Interpretation See Note    Lyme IgG and IgM CSF Immunoblot:    Collection Time: 07/03/24  2:17 PM   Result Value Ref Range    Borrelia burgdorferi IgG Lenora by IB, CSF Negative Negative    Borrelia burgdorferi IgM Lenora by IB, CSF Negative Negative   Lyme disease DNA detection by PCR    Collection Time: 07/03/24  2:17 PM   Result Value Ref Range    Lyme DNAPCR Not Detected    Cytology, non-gynecologic    Collection Time: 07/03/24  2:17 PM   Result Value Ref Range    Final Diagnosis       Specimen A: Cerebrospinal fluid, lumbar puncture:     Interpretation:      Negative for malignancy.     Adequacy:     Satisfactory for evaluation.      Clinical Information       Headaches.      Gross Description       A(A). Lumbar Puncture, CSF:  Received 3 ml of clear, colorless fluid, processed as one Pap stained cytospin and one Brody stained cytospin.      Microscopic Description       Microscopic examination was performed.        Performing Labs       The technical component of this testing was completed at [facility].     Stain controls for all stains resulted within this report have been reviewed and show appropriate reactivity.      Radha Rogers Virus  Qualitative PCR    Collection Time: 07/03/24  2:17 PM    Specimen: Lumbar Puncture; Cerebrospinal fluid   Result Value Ref Range    EBV Qualitative PCR Not Detected    Angiotensin Converting Enzyme CSF:    Collection Time: 07/03/24  2:17 PM   Result Value Ref Range    ACE Angiotensin Conv Enz CSF 3.3 (H) 0.0 - 2.5 U/L   Protein total CSF:    Collection Time: 07/03/24  2:17 PM   Result Value Ref Range    Protein total CSF 40.2 15.0 - 45.0 mg/dL   Glucose CSF:    Collection Time: 07/03/24  2:17 PM   Result Value Ref Range    Glucose CSF 49 40 - 70 mg/dL   Cell Count CSF    Collection Time: 07/03/24  2:17 PM   Result Value Ref Range    Tube Number 4     Color Colorless Colorless    Clarity Clear Clear    Total Nucleated Cells 2 0 - 5 /uL    RBC Count 0 0 - 2 /uL   Acid-Fast Bacilli Culture and Stain    Collection Time: 07/03/24  2:17 PM    Specimen: Lumbar Puncture; Cerebrospinal fluid   Result Value Ref Range    Acid Fast Stain      Acid Fast Stain      Acid Fast Stain     Comprehensive metabolic panel    Collection Time: 07/22/24  1:01 PM   Result Value Ref Range    Sodium 137 135 - 145 mmol/L    Potassium 4.1 3.4 - 5.3 mmol/L    Carbon Dioxide (CO2) 22 22 - 29 mmol/L    Anion Gap 12 7 - 15 mmol/L    Urea Nitrogen 14.6 8.0 - 23.0 mg/dL    Creatinine 0.56 0.51 - 0.95 mg/dL    GFR Estimate 90 >60 mL/min/1.73m2    Calcium 8.3 (L) 8.8 - 10.4 mg/dL    Chloride 103 98 - 107 mmol/L    Glucose 121 (H) 70 - 99 mg/dL    Alkaline Phosphatase 118 40 - 150 U/L    AST 28 0 - 45 U/L    ALT 12 0 - 50 U/L    Protein Total 5.8 (L) 6.4 - 8.3 g/dL    Albumin 4.2 3.5 - 5.2 g/dL    Bilirubin Total 0.5 <=1.2 mg/dL   CBC with platelets and differential    Collection Time: 07/22/24  2:07 PM   Result Value Ref Range    WBC Count 4.5 4.0 - 11.0 10e3/uL    RBC Count 2.89 (L) 3.80 - 5.20 10e6/uL    Hemoglobin 10.5 (L) 11.7 - 15.7 g/dL    Hematocrit 30.9 (L) 35.0 - 47.0 %     (H) 78 - 100 fL    MCH 36.3 (H) 26.5 - 33.0 pg    MCHC 34.0 31.5  - 36.5 g/dL    RDW 13.7 10.0 - 15.0 %    Platelet Count 141 (L) 150 - 450 10e3/uL    % Neutrophils 39 %    % Lymphocytes 50 %    % Monocytes 10 %    % Eosinophils 0 %    % Basophils 0 %    % Immature Granulocytes 0 %    NRBCs per 100 WBC 0 <1 /100    Absolute Neutrophils 1.7 1.6 - 8.3 10e3/uL    Absolute Lymphocytes 2.3 0.8 - 5.3 10e3/uL    Absolute Monocytes 0.4 0.0 - 1.3 10e3/uL    Absolute Eosinophils 0.0 0.0 - 0.7 10e3/uL    Absolute Basophils 0.0 0.0 - 0.2 10e3/uL    Absolute Immature Granulocytes 0.0 <=0.4 10e3/uL    Absolute NRBCs 0.0 10e3/uL     Recent Results (from the past 744 hour(s))   MA Screen Bilateral w/Chivo    Narrative    BILATERAL FULL FIELD DIGITAL SCREENING MAMMOGRAM WITH TOMOSYNTHESIS    Performed on: 6/24/24    Compared to: 06/23/2022, 05/25/2021, and 09/11/2019    Technique:  This study was evaluated with the assistance of Computer-Aided   Detection.  Breast Tomosynthesis was used in interpretation.    Findings: The breasts are heterogeneously dense, which may obscure small   masses.  There is no radiographic evidence of malignancy.     Impression    IMPRESSION: ACR BI-RADS Category 1: Negative    BREAST CANCER SCREENING RECOMMENDATION: Routine yearly mammography   beginning at age 40 or as discussed with your provider.    The results and recommendations of this examination will be communicated   to the patient.        Leandra Castro MD     XR Lumbar Puncture Spinal Tap Diagnostic    Narrative    EXAM: XR LUMBAR PUNCTURE SPINAL TAP DIAGNOSTIC  7/3/2024 3:06 PM       History:  Please see written for lab orders; Polyneuropathy. Bilateral  weakness. Evaluation for demyelinating disease. Significant  degenerative disc disease seen.    PROCEDURE: The patient consented for a lumbar puncture. The benefits  and risks of the procedure were explained to the patient, including  but not limited to worsening headache, hemorrhage, infection, lower  extremity pain, or nerve root injury. The patient  was sterilely  prepped and draped with the patient in the supine position, over the  lower back. Under fluoroscopic guidance, the interlaminar spaces were  noted. 1% lidocaine was administered for local anesthetic over the  L3-4 interlaminar space, and a 22 gauge needle was advanced into the  thecal sac under fluoroscopic guidance.  There was initial aspiration  of clear CSF. About 11 cc's total were aspirated.  The needle was  removed. There were no immediate complications associated with the  procedure.   Estimated blood loss during the procedure was less than 5  mL.    Opening Pressure: Normal opening pressure  Fluoro time: 0.2 minutes  Images Obtained: 2  Medications used: 3 mL 1% lidocaine      Impression    IMPRESSION: Successful lumbar puncture.    BECCA MUHAMMAD PA-C         SYSTEM ID:  [id number]   Again, thank you for allowing me to participate in the care of your patient.        Sincerely,        Fely Ramos MD

## 2024-07-22 NOTE — PROGRESS NOTES
Nursing Note:  Tricia Sosa presents today for PIV placement and labs.    Patient seen by provider today: Yes: Dr Ramos   present during visit today: Not Applicable.    Note: N/A.    Intravenous Access:  Peripheral IV placed.    Discharge Plan:   Patient was sent to [facility] for MD appointment.    Pari Burk RN

## 2024-07-22 NOTE — NURSING NOTE
"Oncology Rooming Note    July 22, 2024 1:24 PM   Tricia Sosa is a 83 year old female who presents for:    Chief Complaint   Patient presents with    Oncology Clinic Visit     Initial Vitals: /67   Pulse 86   Temp 98.6  F (37  C) (Tympanic)   Resp 16   Ht 1.81 m (5' 11.25\")   Wt 64.9 kg (143 lb)   LMP  (LMP Unknown)   SpO2 96%   BMI 19.80 kg/m   Estimated body mass index is 19.8 kg/m  as calculated from the following:    Height as of this encounter: 1.81 m (5' 11.25\").    Weight as of this encounter: 64.9 kg (143 lb). Body surface area is 1.81 meters squared.  No Pain (0) Comment: Data Unavailable   No LMP recorded (lmp unknown). Patient is postmenopausal.  Allergies reviewed: Yes  Medications reviewed: Yes    Medications: Medication refills not needed today.  Pharmacy name entered into [employer]:    [place], [place] - [address] [id number] [place], [place] - [address] [facility]    Frailty Screening:   Is the patient here for a new oncology consult visit in cancer care? 2. No      Clinical concerns: f/u       Corrie Hurt CMA              "

## 2024-07-23 LAB
IGA SERPL-MCNC: <2 MG/DL (ref 84–499)
IGG SERPL-MCNC: 583 MG/DL (ref 610–1616)
IGM SERPL-MCNC: 12 MG/DL (ref 35–242)

## 2024-07-24 LAB — VIT B12 SERPL-MCNC: 1580 PG/ML (ref 232–1245)

## 2024-07-31 ENCOUNTER — TRANSFERRED RECORDS (OUTPATIENT)
Dept: HEALTH INFORMATION MANAGEMENT | Facility: CLINIC | Age: 84
End: 2024-07-31
Payer: MEDICARE

## 2024-08-01 ENCOUNTER — HOSPITAL ENCOUNTER (OUTPATIENT)
Dept: MRI IMAGING | Facility: CLINIC | Age: 84
Discharge: HOME | End: 2024-08-01
Attending: INTERNAL MEDICINE | Admitting: INTERNAL MEDICINE
Payer: MEDICARE

## 2024-08-01 DIAGNOSIS — K86.2 PANCREATIC CYST: ICD-10-CM

## 2024-08-01 PROCEDURE — 74183 MRI ABD W/O CNTR FLWD CNTR: CPT

## 2024-08-01 PROCEDURE — A9585 GADOBUTROL INJECTION: HCPCS

## 2024-08-01 PROCEDURE — 255N000002 HC RX 255 OP 636

## 2024-08-01 RX ORDER — GADOBUTROL 604.72 MG/ML
6.5 INJECTION INTRAVENOUS ONCE
Status: COMPLETED | OUTPATIENT
Start: 2024-08-01 | End: 2024-08-01

## 2024-08-01 RX ADMIN — GADOBUTROL 6.5 ML: 604.72 INJECTION INTRAVENOUS at 16:08

## 2024-08-05 ENCOUNTER — OFFICE VISIT (OUTPATIENT)
Dept: INTERNAL MEDICINE | Facility: CLINIC | Age: 84
End: 2024-08-05
Payer: MEDICARE

## 2024-08-05 ENCOUNTER — TELEPHONE (OUTPATIENT)
Dept: INTERNAL MEDICINE | Facility: CLINIC | Age: 84
End: 2024-08-05

## 2024-08-05 VITALS
HEIGHT: 71 IN | OXYGEN SATURATION: 96 % | WEIGHT: 144 LBS | HEART RATE: 79 BPM | TEMPERATURE: 97.7 F | BODY MASS INDEX: 20.16 KG/M2 | DIASTOLIC BLOOD PRESSURE: 77 MMHG | RESPIRATION RATE: 16 BRPM | SYSTOLIC BLOOD PRESSURE: 125 MMHG

## 2024-08-05 DIAGNOSIS — D83.9 CVID (COMMON VARIABLE IMMUNODEFICIENCY) (H): ICD-10-CM

## 2024-08-05 DIAGNOSIS — G62.9 POLYNEUROPATHY: ICD-10-CM

## 2024-08-05 DIAGNOSIS — R60.9 EDEMA, UNSPECIFIED TYPE: ICD-10-CM

## 2024-08-05 DIAGNOSIS — E03.9 ACQUIRED HYPOTHYROIDISM: Primary | ICD-10-CM

## 2024-08-05 PROCEDURE — G2211 COMPLEX E/M VISIT ADD ON: HCPCS

## 2024-08-05 PROCEDURE — 84443 ASSAY THYROID STIM HORMONE: CPT

## 2024-08-05 PROCEDURE — 99213 OFFICE O/P EST LOW 20 MIN: CPT

## 2024-08-05 PROCEDURE — 36415 COLL VENOUS BLD VENIPUNCTURE: CPT

## 2024-08-05 RX ORDER — HYDROCHLOROTHIAZIDE 12.5 MG/1
TABLET ORAL
Qty: 30 TABLET | Refills: 2 | Status: SHIPPED | OUTPATIENT
Start: 2024-08-05

## 2024-08-05 RX ORDER — HYDROCHLOROTHIAZIDE 12.5 MG/1
TABLET ORAL
Qty: 30 TABLET | Refills: 2 | Status: SHIPPED | OUTPATIENT
Start: 2024-08-05 | End: 2024-08-05

## 2024-08-05 RX ORDER — LEVOTHYROXINE SODIUM 150 UG/1
150 TABLET ORAL DAILY
Qty: 90 TABLET | Refills: 0 | Status: CANCELLED | OUTPATIENT
Start: 2024-08-05

## 2024-08-05 NOTE — TELEPHONE ENCOUNTER
Pharmacy is calling because they need more specific directions for the hydrochlorothiazide. What is the max per day the patient can/should take?  Please send a new order.

## 2024-08-05 NOTE — PROGRESS NOTES
Assessment & Plan     She complains of ongoing bilateral knee pain- she was last seen by orthopedics in October of 2023 and received steroid injections.  She tells me she was not having pain at the time of injection. Thus, she is not sure if the injections helped with her underlying pain or not. She is planning to follow up with orthopedics    (E03.9) Acquired hypothyroidism  (primary encounter diagnosis)  Comment: Update TSH - currently taking Synthroid 150MCG   Plan: TSH WITH FREE T4 REFLEX            (R60.9) Edema, unspecified type  Comment: She would like prescription to have hydrochlorothiazide to use as needed for bilateral lower extremity edema  Plan: hydroCHLOROthiazide 12.5 MG tablet            (D83.9) CVID (common variable immunodeficiency) (H)  Comment:   She Follows with Oncology for History of CVID As Well As Autoimmune Hemolytic Anemia IgA Deficiency. She Receives Rituxan Every 8 Weeks and IVIG As Needed Depending upon Blood Work.   Plan:     (G62.9) Polyneuropathy  Comment:    She is being followed by Dr. Reynolds of Neurology for ongoing weakness. She had muscle biopsy performed in January of 2024 which showed a chronic neurogenic process.   She had spinal tap which was largely unremarkable. Neurology note is below from 7/31/24:  Putting all of this together, I think the only clear explanation for her generalized weakness is her severe polyneuropathy. 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 Paraneoplastic and autoimmune antibody evaluation has been unremarkable, and she is already on IVIG and rituximab treatment. This could have been 1 reason for her negative evaluation now.    At this point, I do not see any active inflammation to point towards an increase in her immune suppressant therapy. Would recommend continuing IVIG and rituximab at her current regime. Will be happy to see her should she worsen at a more rapid rate going forward.     She is starting PT  "this week to help with muscle weakness/muscle loss.       The longitudinal plan of care for the diagnosis(es)/condition(s) as documented were addressed during this visit. Due to the added complexity in care, I will continue to support Tricia in the subsequent management and with ongoing continuity of care.      Subjective   Tricia is a 83 year old, presenting for the following health issues:  Establish Care      8/5/2024     1:14 PM   Additional Questions   Roomed by Rowan     History of Present Illness       Reason for visit:  New provider visit    She eats 2-3 servings of fruits and vegetables daily.She consumes 0 sweetened beverage(s) daily.She exercises with enough effort to increase her heart rate 10 to 19 minutes per day.  She is missing 1 dose(s) of medications per week.  She is not taking prescribed medications regularly due to remembering to take.               Objective    /77   Pulse 79   Temp 97.7  F (36.5  C) (Oral)   Resp 16   Ht 1.803 m (5' 11\")   Wt 65.3 kg (144 lb)   LMP  (LMP Unknown)   SpO2 96%   BMI 20.08 kg/m    Body mass index is 20.08 kg/m .    Physical Exam  Constitutional:       General: She is not in acute distress.     Appearance: Normal appearance. She is not ill-appearing, toxic-appearing or diaphoretic.   HENT:      Head: Normocephalic and atraumatic.   Eyes:      Conjunctiva/sclera: Conjunctivae normal.   Cardiovascular:      Rate and Rhythm: Normal rate and regular rhythm.      Heart sounds: Normal heart sounds.   Pulmonary:      Effort: Pulmonary effort is normal.      Breath sounds: Normal breath sounds.   Skin:     General: Skin is warm and dry.   Neurological:      Mental Status: She is alert and oriented to person, place, and time.   Psychiatric:         Mood and Affect: Mood normal.         Behavior: Behavior normal.         Thought Content: Thought content normal.         Judgment: Judgment normal.             Signed Electronically by: SHANIQUE Almeida CNP    "

## 2024-08-06 DIAGNOSIS — E03.9 ACQUIRED HYPOTHYROIDISM: ICD-10-CM

## 2024-08-06 LAB — TSH SERPL DL<=0.005 MIU/L-ACNC: 3.62 UIU/ML (ref 0.3–4.2)

## 2024-08-06 RX ORDER — LEVOTHYROXINE SODIUM 150 UG/1
150 TABLET ORAL DAILY
Qty: 90 TABLET | Refills: 3 | Status: SHIPPED | OUTPATIENT
Start: 2024-08-06

## 2024-08-07 ENCOUNTER — DOCUMENTATION ONLY (OUTPATIENT)
Dept: PHARMACY | Facility: CLINIC | Age: 84
End: 2024-08-07
Payer: MEDICARE

## 2024-08-07 ENCOUNTER — TRANSFERRED RECORDS (OUTPATIENT)
Dept: HEALTH INFORMATION MANAGEMENT | Facility: CLINIC | Age: 84
End: 2024-08-07
Payer: MEDICARE

## 2024-08-07 NOTE — PROGRESS NOTES
Pharmacist IVIG Stewardship Program    Diagnosis: Common Variable Immunodeficiency  Patient was originally diagnosed with [place] back in the 2000's and later transferred her care to [facility]   Date 5/27/2015   IgA Level  Undetectable   IgG Total 658   IgG2 106   IgG4 0.5   IgM Level 48     Current Dosing Regimen: Privigen 35g IV every 8 weeks if IgG <600mg/dL.    Titrated down from every 3 week infusions to every 4 week infusions in 2017  Titrated down further from every 4 weeks to every 6 weeks infusions in March 2018, but patient declined drastically and had to be escalated back to monthly infusions in September 2018  On IVIG every 8 weeks if IgG <600 per provider appointment in February 2024  Pre-medications:  Methylprednisolone 20 mg IV push prior to infusion  Current Titration Regimen: 0.5ml/kg/hr and increased by 0.5ml/kg/hr every 15 minutes, max rate 4ml/kg/hr.  Previously Tried Products: Gammagard SD, currently on Privigen    Side Effects: Patient denies side effects such as headaches, flushing, fever, muscle or joint pain, nausea, or rash/itching.    Interventions: Lab review completed. Care team notified.       Assessment:   Date  7/22/2024   IgG 583   Patient is appropriate for additional IVIG therapy when their level is within range. She is tolerating infusions well and IVIG infusions are effective in increasing Ig levels to an appropriate level to minimize patients risk of infection. Patient should continue on treatment as she has been per provider orders, without therapy her levels would drop below therapeutic range.    Plan: Patient is okay to proceed with IVIG infusion on 8/19/24, and through 10/22/2024 as long as no additional IgG levels are drawn. Once additional labs are drawn patient will need to be reassessed for necessity of additional IVIG infusions    Next Review Needed: 10/2024    Lynsey Narvaez, PharmD, IgCP  Medication Access Pharmacist

## 2024-08-19 ENCOUNTER — VIRTUAL VISIT (OUTPATIENT)
Dept: INTERNAL MEDICINE | Facility: CLINIC | Age: 84
End: 2024-08-19
Payer: MEDICARE

## 2024-08-19 ENCOUNTER — TELEPHONE (OUTPATIENT)
Dept: INTERNAL MEDICINE | Facility: CLINIC | Age: 84
End: 2024-08-19

## 2024-08-19 DIAGNOSIS — U07.1 INFECTION DUE TO 2019 NOVEL CORONAVIRUS: Primary | ICD-10-CM

## 2024-08-19 PROCEDURE — 99213 OFFICE O/P EST LOW 20 MIN: CPT | Mod: 95 | Performed by: PHYSICIAN ASSISTANT

## 2024-08-19 NOTE — PROGRESS NOTES
Tricia is a 83 year old who is being evaluated via a billable video visit.    What phone number would you like to be contacted at? [phone]  How would you like to obtain your AVS? MyChart      Assessment & Plan     Infection due to 2019 novel coronavirus  She is considered high risk for COVID complications, given her history of immunodeficiency.  Recommend Paxlovid.  Discussed potential for metallic taste and rebound symptoms.  Recommend close monitoring of her symptoms.  If she develops worsening symptoms, particularly respiratory symptoms, she should be seen in person.  Discussed over-the-counter medications that can help with symptoms.  Also discussed isolation recommendations from the CDC.  - nirmatrelvir and ritonavir (PAXLOVID) 300 mg/100 mg therapy pack; Take 3 tablets by mouth 2 times daily for 5 days    Subjective   Tricia is a 83 year old, presenting for the following health issues:  Covid Concern (She tested positive for covid this morning. She is requesting Paxlovid.)      8/19/2024     1:12 PM   Additional Questions   Roomed by Violeta Avalos MA   Accompanied by Self     Video Start Time:  Changed to telephone encounter    HPI       COVID-19 Symptom Review  How many days ago did these symptoms start? Started Saturday/last night. Tested positive this morning    Are any of the following symptoms significant for you?  New or worsening difficulty breathing? No  Worsening cough? Yes, I am coughing up mucus.  Fever or chills? Yes, I felt feverish or had chills.  Headache: No  Sore throat: No  Chest pain: No  Diarrhea: No  Body aches? No    What treatments has patient tried? None   Does patient live in a nursing home, group home, or shelter? No  Does patient have a way to get food/medications during quarantined? Yes, I have a friend or family member who can help me.        Family members also have tested positive  This is her first time having COVID  On IVIg for immunodeficiency  Rituximab for treatment of  hemolytic anemia  No recent prednisone     Past Medical History:   Diagnosis Date    Arthritis     WARD (dyspnea on exertion)     External hemorrhoids without mention of complication 06/27/2005    Hemolytic anemia (H24)     autoimmune    Hypothyroidism     IgA deficiency (H)     Myopia of both eyes with astigmatism and presbyopia 08/16/2017    Other malaise and fatigue     Other severe protein-calorie malnutrition     Other vitreous opacities 12/19/2006    Sleep apnea     Thyroid disease     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      Objective    Vitals - Patient Reported  Weight (Patient Reported): 65.8 kg (145 lb)        Physical Exam   GENERAL: alert and no distress  EYES: Eyes grossly normal to inspection.  No discharge or erythema, or obvious scleral/conjunctival abnormalities.  RESP: No audible wheeze, cough, or visible cyanosis.    SKIN: Visible skin clear. No significant rash, abnormal pigmentation or lesions.  NEURO: Cranial nerves grossly intact.  Mentation and speech appropriate for age.  PSYCH: Appropriate affect, tone, and pace of words        Video-Visit Details    Type of service:  Video Visit   Video End Time: Changed to telephone encounter  Originating Location (pt. Location): Home    Distant Location (provider location):  On-site  Platform used for Video Visit: Telephone  Signed Electronically by: Dorita Mota PA-C

## 2024-08-19 NOTE — TELEPHONE ENCOUNTER
Pt calls stating she has COVID. Symptoms started yesterday.   Positive test this AM.   Pt has immune deficiency disease, she takes Rituximab and IVIG every 8 weeks, not on list of interactions, but pt should probably discuss this with Provider.  Scheduled today with same day provider. She agrees with this.     RN COVID TREATMENT VISIT  08/19/24      The patient has been triaged and does not require a higher level of care.    Tricia Sosa  83 year old  Current weight? 145    Has the patient been seen by a primary care provider at an [facility] or [facility] within the past two years? Yes.   Have you been in close proximity to/do you have a known exposure to a person with a confirmed case of influenza? No.     General treatment eligibility:  Date of positive COVID test (PCR or at home)?  8/19/24    Are you or have you been hospitalized for this COVID-19 infection? No.   Have you received monoclonal antibodies or antiviral treatment for COVID-19 since this positive test? No.   Do you have any of the following conditions that place you at risk of being very sick from COVID-19?   - Age 50 years or older  - Primary Immune Deficiency Disease such as (not exhaustive - see standing order for complete list): Autoimmune Lymphoproliferative Syndrome (ALPS), Chronic Granulomatous Disease (CGD), Glycosylation Disorders with Immunodeficiency, Severe Combined Immunodeficiency (SCID), X-Linked   Yes, patient has at least one high risk condition as noted above.     Current COVID symptoms:   - cough  - sore throat  - congestion or runny nose  Yes. Patient has at least one symptom as selected.     How many days since symptoms started? 5 days or less. Established patient, 12 years or older weighing at least 88.2 lbs, who has symptoms that started in the past 5 days, has not been hospitalized nor received treatment already, and is at risk for being very sick from COVID-19.     Treatment eligibility by  RN:  Are you currently pregnant or nursing? No  Do you have a clinically significant hypersensitivity to nirmatrelvir or ritonavir, or toxic epidermal necrolysis (TEN) or Erazo-Yury Syndrome? No  Do you have a history of hepatitis, any hepatic impairment on the Problem List (such as Child-Florentino Class C, cirrhosis, fatty liver disease, alcoholic liver disease), or was the last liver lab (hepatic panel, ALT, AST, ALK Phos, bilirubin) elevated in the past 6 months? No  Do you have any history of severe renal impairment (eGFR < 30mL/min)? No    Is patient eligible to continue? Yes, patient meets all eligibility requirements for the RN COVID treatment (as denoted by all no responses above).     Current Outpatient Medications   Medication Sig Dispense Refill    cyanocobalamin (VITAMIN  B-12) 1000 MCG tablet   3    folic acid (FOLVITE) 1 MG tablet   3    hydroCHLOROthiazide 12.5 MG tablet TAKE 1 TABLET AS NEEDED FOR EDEMA 30 tablet 2    ketoconazole (NIZORAL) 2 % external shampoo Use every other day to scalp as needed 120 mL 0    levothyroxine (SYNTHROID/LEVOTHROID) 150 MCG tablet Take 1 tablet (150 mcg) by mouth daily 90 tablet 3    omega 3 1000 MG CAPS Take 1,280 mg by mouth daily      triamcinolone (KENALOG) 0.1 % external ointment Apply topically 2 times daily         Medications from List 1 of the standing order (on medications that exclude the use of Paxlovid) that patient is taking: NONE. Is patient taking Denny's Wort? No  Is patient taking Denny's Wort or any meds from List 1? No.   Medications from List 2 of the standing order (on meds that provider needs to adjust) that patient is taking: NONE. Is patient on any of the meds from List 2? No.   Medications from List 3 of standing order (on meds that a RN needs to adjust) that patient is taking:  Rituximab and IVIG   Is patient on any meds from List 3? Yes. Patient is on at least one med that needs a provider visit and will be scheduled or transferred to  a  at the end of this call.   Adelia Lutz RN

## 2024-08-29 LAB
ACID FAST STAIN (ARUP): NORMAL

## 2024-09-05 ENCOUNTER — TRANSFERRED RECORDS (OUTPATIENT)
Dept: HEALTH INFORMATION MANAGEMENT | Facility: CLINIC | Age: 84
End: 2024-09-05
Payer: MEDICARE

## 2024-09-10 ENCOUNTER — INFUSION THERAPY VISIT (OUTPATIENT)
Dept: INFUSION THERAPY | Facility: CLINIC | Age: 84
End: 2024-09-10
Attending: PHYSICIAN ASSISTANT
Payer: MEDICARE

## 2024-09-10 VITALS
WEIGHT: 141.7 LBS | SYSTOLIC BLOOD PRESSURE: 118 MMHG | RESPIRATION RATE: 16 BRPM | DIASTOLIC BLOOD PRESSURE: 64 MMHG | TEMPERATURE: 97.5 F | HEART RATE: 96 BPM | BODY MASS INDEX: 19.76 KG/M2 | OXYGEN SATURATION: 95 %

## 2024-09-10 DIAGNOSIS — D83.9 CVID (COMMON VARIABLE IMMUNODEFICIENCY) (H): ICD-10-CM

## 2024-09-10 DIAGNOSIS — D59.10 AUTOIMMUNE HEMOLYTIC ANEMIA (H): ICD-10-CM

## 2024-09-10 DIAGNOSIS — D80.3 SELECTIVE DEFICIENCY OF IGG SUBCLASSES (H): ICD-10-CM

## 2024-09-10 DIAGNOSIS — D59.19 OTHER AUTOIMMUNE HEMOLYTIC ANEMIA (H): Primary | ICD-10-CM

## 2024-09-10 PROCEDURE — 82784 ASSAY IGA/IGD/IGG/IGM EACH: CPT | Performed by: INTERNAL MEDICINE

## 2024-09-10 PROCEDURE — 250N000011 HC RX IP 250 OP 636: Performed by: INTERNAL MEDICINE

## 2024-09-10 PROCEDURE — 96365 THER/PROPH/DIAG IV INF INIT: CPT

## 2024-09-10 PROCEDURE — 96375 TX/PRO/DX INJ NEW DRUG ADDON: CPT

## 2024-09-10 PROCEDURE — 96366 THER/PROPH/DIAG IV INF ADDON: CPT

## 2024-09-10 PROCEDURE — 36415 COLL VENOUS BLD VENIPUNCTURE: CPT

## 2024-09-10 RX ORDER — DIPHENHYDRAMINE HYDROCHLORIDE 50 MG/ML
50 INJECTION INTRAMUSCULAR; INTRAVENOUS
Start: 2024-10-16

## 2024-09-10 RX ORDER — METHYLPREDNISOLONE SODIUM SUCCINATE 40 MG/ML
20 INJECTION, POWDER, LYOPHILIZED, FOR SOLUTION INTRAMUSCULAR; INTRAVENOUS ONCE
Status: COMPLETED | OUTPATIENT
Start: 2024-09-10 | End: 2024-09-10

## 2024-09-10 RX ORDER — METHYLPREDNISOLONE SODIUM SUCCINATE 40 MG/ML
20 INJECTION, POWDER, LYOPHILIZED, FOR SOLUTION INTRAMUSCULAR; INTRAVENOUS ONCE
Start: 2024-10-16 | End: 2024-10-16

## 2024-09-10 RX ORDER — HEPARIN SODIUM,PORCINE 10 UNIT/ML
5 VIAL (ML) INTRAVENOUS
OUTPATIENT
Start: 2024-10-16

## 2024-09-10 RX ORDER — ALBUTEROL SULFATE 0.83 MG/ML
2.5 SOLUTION RESPIRATORY (INHALATION)
OUTPATIENT
Start: 2024-10-16

## 2024-09-10 RX ORDER — MEPERIDINE HYDROCHLORIDE 25 MG/ML
25 INJECTION INTRAMUSCULAR; INTRAVENOUS; SUBCUTANEOUS EVERY 30 MIN PRN
OUTPATIENT
Start: 2024-10-16

## 2024-09-10 RX ORDER — HEPARIN SODIUM (PORCINE) LOCK FLUSH IV SOLN 100 UNIT/ML 100 UNIT/ML
5 SOLUTION INTRAVENOUS
OUTPATIENT
Start: 2024-10-16

## 2024-09-10 RX ORDER — ALBUTEROL SULFATE 90 UG/1
1-2 AEROSOL, METERED RESPIRATORY (INHALATION)
Start: 2024-10-16

## 2024-09-10 RX ORDER — EPINEPHRINE 1 MG/ML
0.3 INJECTION, SOLUTION INTRAMUSCULAR; SUBCUTANEOUS EVERY 5 MIN PRN
OUTPATIENT
Start: 2024-10-16

## 2024-09-10 RX ORDER — NALOXONE HYDROCHLORIDE 0.4 MG/ML
0.2 INJECTION, SOLUTION INTRAMUSCULAR; INTRAVENOUS; SUBCUTANEOUS
OUTPATIENT
Start: 2024-10-16

## 2024-09-10 RX ORDER — METHYLPREDNISOLONE SODIUM SUCCINATE 125 MG/2ML
125 INJECTION, POWDER, LYOPHILIZED, FOR SOLUTION INTRAMUSCULAR; INTRAVENOUS
Start: 2024-10-16

## 2024-09-10 RX ADMIN — HUMAN IMMUNOGLOBULIN G 35 G: 20 LIQUID INTRAVENOUS at 13:30

## 2024-09-10 RX ADMIN — METHYLPREDNISOLONE SODIUM SUCCINATE 20 MG: 40 INJECTION, POWDER, FOR SOLUTION INTRAMUSCULAR; INTRAVENOUS at 13:28

## 2024-09-10 NOTE — PROGRESS NOTES
Infusion Nursing Note:  Tricia Sosa presents today for IVIG.    Patient seen by provider today: No   present during visit today: Not Applicable.    Note: Pre-medicated with Solu-Cortef.  IVIG infusion initiated at 0.5 ml/kg/hr and increased by 0.5 ml/kg/hr every 15 minutes to max rate of 4 ml/kg/hr.    IGG drawn today. Most recent was   07/03/24 14:17   Immunoglobulin Serum IgG 752 (L)     Intravenous Access:  Peripheral IV placed.    Treatment Conditions:  She denies any new symptoms, reactions, fever/elevated temperature, illness, recent major or local infection, being on antibiotics, productive cough, or recent surgery.    Had covid last month, overall felt well with it.     Post Infusion Assessment:  Patient tolerated infusion without incident.  Blood return noted pre and post infusion.  Site patent and intact, free from redness, edema or discomfort.  No evidence of extravasations.  Access discontinued per protocol.       Discharge Plan:   Discharge instructions reviewed with: Patient.  Patient and/or family verbalized understanding of discharge instructions and all questions answered.  AVS to patient via [employer].  Patient will return 9/16/24 for next appointment.   Patient discharged in stable condition accompanied by: self.  Departure Mode: Ambulatory.      Tricia Storey RN

## 2024-09-11 LAB — IGG SERPL-MCNC: 314 MG/DL (ref 610–1616)

## 2024-09-16 ENCOUNTER — ONCOLOGY VISIT (OUTPATIENT)
Dept: ONCOLOGY | Facility: CLINIC | Age: 84
End: 2024-09-16
Attending: PHYSICIAN ASSISTANT
Payer: MEDICARE

## 2024-09-16 ENCOUNTER — INFUSION THERAPY VISIT (OUTPATIENT)
Dept: INFUSION THERAPY | Facility: CLINIC | Age: 84
End: 2024-09-16
Attending: PHYSICIAN ASSISTANT
Payer: MEDICARE

## 2024-09-16 VITALS
WEIGHT: 140.2 LBS | HEART RATE: 77 BPM | BODY MASS INDEX: 19.55 KG/M2 | TEMPERATURE: 98.7 F | DIASTOLIC BLOOD PRESSURE: 63 MMHG | OXYGEN SATURATION: 99 % | RESPIRATION RATE: 16 BRPM | SYSTOLIC BLOOD PRESSURE: 121 MMHG

## 2024-09-16 VITALS
OXYGEN SATURATION: 99 % | RESPIRATION RATE: 16 BRPM | SYSTOLIC BLOOD PRESSURE: 121 MMHG | WEIGHT: 140 LBS | HEART RATE: 77 BPM | TEMPERATURE: 98.7 F | DIASTOLIC BLOOD PRESSURE: 63 MMHG | BODY MASS INDEX: 19.6 KG/M2 | HEIGHT: 71 IN

## 2024-09-16 DIAGNOSIS — D59.19 OTHER AUTOIMMUNE HEMOLYTIC ANEMIA (H): Primary | ICD-10-CM

## 2024-09-16 DIAGNOSIS — D83.9 CVID (COMMON VARIABLE IMMUNODEFICIENCY) (H): ICD-10-CM

## 2024-09-16 DIAGNOSIS — D59.10 AUTOIMMUNE HEMOLYTIC ANEMIA (H): ICD-10-CM

## 2024-09-16 LAB
BASOPHILS # BLD AUTO: 0 10E3/UL (ref 0–0.2)
BASOPHILS NFR BLD AUTO: 0 %
EOSINOPHIL # BLD AUTO: 0 10E3/UL (ref 0–0.7)
EOSINOPHIL NFR BLD AUTO: 0 %
ERYTHROCYTE [DISTWIDTH] IN BLOOD BY AUTOMATED COUNT: 13.7 % (ref 10–15)
HCT VFR BLD AUTO: 31.8 % (ref 35–47)
HGB BLD-MCNC: 10.7 G/DL (ref 11.7–15.7)
IMM GRANULOCYTES # BLD: 0 10E3/UL
IMM GRANULOCYTES NFR BLD: 0 %
LYMPHOCYTES # BLD AUTO: 2.1 10E3/UL (ref 0.8–5.3)
LYMPHOCYTES NFR BLD AUTO: 40 %
MCH RBC QN AUTO: 34.2 PG (ref 26.5–33)
MCHC RBC AUTO-ENTMCNC: 33.6 G/DL (ref 31.5–36.5)
MCV RBC AUTO: 102 FL (ref 78–100)
MONOCYTES # BLD AUTO: 0.6 10E3/UL (ref 0–1.3)
MONOCYTES NFR BLD AUTO: 11 %
NEUTROPHILS # BLD AUTO: 2.7 10E3/UL (ref 1.6–8.3)
NEUTROPHILS NFR BLD AUTO: 49 %
NRBC # BLD AUTO: 0 10E3/UL
NRBC BLD AUTO-RTO: 0 /100
PLATELET # BLD AUTO: 164 10E3/UL (ref 150–450)
RBC # BLD AUTO: 3.13 10E6/UL (ref 3.8–5.2)
WBC # BLD AUTO: 5.4 10E3/UL (ref 4–11)

## 2024-09-16 PROCEDURE — 250N000011 HC RX IP 250 OP 636: Mod: JZ | Performed by: PHYSICIAN ASSISTANT

## 2024-09-16 PROCEDURE — 99214 OFFICE O/P EST MOD 30 MIN: CPT | Performed by: PHYSICIAN ASSISTANT

## 2024-09-16 PROCEDURE — 96413 CHEMO IV INFUSION 1 HR: CPT

## 2024-09-16 PROCEDURE — 36415 COLL VENOUS BLD VENIPUNCTURE: CPT

## 2024-09-16 PROCEDURE — 85004 AUTOMATED DIFF WBC COUNT: CPT | Performed by: INTERNAL MEDICINE

## 2024-09-16 PROCEDURE — 250N000013 HC RX MED GY IP 250 OP 250 PS 637: Performed by: PHYSICIAN ASSISTANT

## 2024-09-16 PROCEDURE — 258N000003 HC RX IP 258 OP 636: Performed by: PHYSICIAN ASSISTANT

## 2024-09-16 PROCEDURE — G0463 HOSPITAL OUTPT CLINIC VISIT: HCPCS | Performed by: PHYSICIAN ASSISTANT

## 2024-09-16 RX ORDER — ACETAMINOPHEN 325 MG/1
650 TABLET ORAL ONCE
Status: CANCELLED | OUTPATIENT
Start: 2024-09-16

## 2024-09-16 RX ORDER — ALBUTEROL SULFATE 0.83 MG/ML
2.5 SOLUTION RESPIRATORY (INHALATION)
Status: CANCELLED | OUTPATIENT
Start: 2024-09-16

## 2024-09-16 RX ORDER — ACETAMINOPHEN 325 MG/1
650 TABLET ORAL ONCE
Status: COMPLETED | OUTPATIENT
Start: 2024-09-16 | End: 2024-09-16

## 2024-09-16 RX ORDER — DIPHENHYDRAMINE HYDROCHLORIDE 50 MG/ML
50 INJECTION INTRAMUSCULAR; INTRAVENOUS
Status: CANCELLED
Start: 2024-09-16

## 2024-09-16 RX ORDER — HEPARIN SODIUM,PORCINE 10 UNIT/ML
5 VIAL (ML) INTRAVENOUS
Status: CANCELLED | OUTPATIENT
Start: 2024-09-16

## 2024-09-16 RX ORDER — MEPERIDINE HYDROCHLORIDE 25 MG/ML
25 INJECTION INTRAMUSCULAR; INTRAVENOUS; SUBCUTANEOUS
Status: CANCELLED
Start: 2024-09-16

## 2024-09-16 RX ORDER — NALOXONE HYDROCHLORIDE 0.4 MG/ML
0.2 INJECTION, SOLUTION INTRAMUSCULAR; INTRAVENOUS; SUBCUTANEOUS
Status: CANCELLED | OUTPATIENT
Start: 2024-09-16

## 2024-09-16 RX ORDER — MEPERIDINE HYDROCHLORIDE 25 MG/ML
25 INJECTION INTRAMUSCULAR; INTRAVENOUS; SUBCUTANEOUS EVERY 30 MIN PRN
Status: CANCELLED | OUTPATIENT
Start: 2024-09-16

## 2024-09-16 RX ORDER — METHYLPREDNISOLONE SODIUM SUCCINATE 125 MG/2ML
125 INJECTION, POWDER, LYOPHILIZED, FOR SOLUTION INTRAMUSCULAR; INTRAVENOUS
Status: CANCELLED
Start: 2024-09-16

## 2024-09-16 RX ORDER — ALBUTEROL SULFATE 90 UG/1
1-2 AEROSOL, METERED RESPIRATORY (INHALATION)
Status: CANCELLED
Start: 2024-09-16

## 2024-09-16 RX ORDER — EPINEPHRINE 1 MG/ML
0.3 INJECTION, SOLUTION INTRAMUSCULAR; SUBCUTANEOUS EVERY 5 MIN PRN
Status: CANCELLED | OUTPATIENT
Start: 2024-09-16

## 2024-09-16 RX ORDER — HEPARIN SODIUM (PORCINE) LOCK FLUSH IV SOLN 100 UNIT/ML 100 UNIT/ML
5 SOLUTION INTRAVENOUS
Status: CANCELLED | OUTPATIENT
Start: 2024-09-16

## 2024-09-16 RX ORDER — DIPHENHYDRAMINE HCL 25 MG
50 CAPSULE ORAL ONCE
Status: COMPLETED | OUTPATIENT
Start: 2024-09-16 | End: 2024-09-16

## 2024-09-16 RX ORDER — DIPHENHYDRAMINE HCL 25 MG
50 CAPSULE ORAL ONCE
Status: CANCELLED | OUTPATIENT
Start: 2024-09-16

## 2024-09-16 RX ADMIN — DIPHENHYDRAMINE HYDROCHLORIDE 25 MG: 25 CAPSULE ORAL at 14:49

## 2024-09-16 RX ADMIN — ACETAMINOPHEN 650 MG: 325 TABLET ORAL at 14:49

## 2024-09-16 RX ADMIN — SODIUM CHLORIDE, PRESERVATIVE FREE 250 ML: 5 INJECTION INTRAVENOUS at 14:57

## 2024-09-16 RX ADMIN — RITUXIMAB-ABBS 700 MG: 10 INJECTION, SOLUTION INTRAVENOUS at 14:53

## 2024-09-16 ASSESSMENT — PAIN SCALES - GENERAL: PAINLEVEL: MILD PAIN (3)

## 2024-09-16 NOTE — PROGRESS NOTES
Nursing Note:  Tricia Sosa presents today for PIV start and labs.    Patient seen by provider today: Yes: CHELSEA Escobar at 1330   present during visit today: Not Applicable.    Note: N/A.    Intravenous Access:  Labs drawn without difficulty.  Peripheral IV placed.    Discharge Plan:   Patient was sent to infusion room for treatment and PA appointment.    Sharan Schaefer RN

## 2024-09-16 NOTE — PROGRESS NOTES
Infusion Nursing Note:  Tricia Sosa presents today for C15D1 Truxima.    Patient seen by provider today: Yes: CHELSEA Lucia   present during visit today: Not Applicable.    Note: N/A.    Intravenous Access:  Peripheral IV placed by FT RN.    Treatment Conditions:  Lab Results   Component Value Date    HGB 10.7 (L) 09/16/2024    WBC 5.4 09/16/2024    ANEU 6.8 07/06/2021    ANEUTAUTO 2.7 09/16/2024     09/16/2024            Post Infusion Assessment:  Patient tolerated infusion without incident.  Blood return noted pre and post infusion.  Site patent and intact, free from redness, edema or discomfort.  No evidence of extravasations.  Access discontinued per protocol.       Discharge Plan:   AVS to patient via MYCHART.  Patient will return in 8 weeks for next appointment.   Patient discharged in stable condition accompanied by: self.  Departure Mode: Ambulatory with walker.      Pari Burk RN

## 2024-09-16 NOTE — LETTER
9/16/2024      Tricia Sosa  [id number] [id number]      Dear Colleague,    Thank you for referring your patient, Tricia Sosa, to the [facility] [place]. Please see a copy of my visit note below.    Oncology/Hematology Visit Note    Sep 16, 2024    Reason for visit: Follow up of CVID, autoimmune hemolytic anemia, iron deficiency      Oncology HPI: Tricia Sosa is a 83 year old female who presents with autoimmune hemolytic anemia and IgA deficiency. She was previously seen at [facility].     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  She was very sick at the time and had severe diarrhea.  She lost about 40 pounds in weight.  The workup revealed that she had markedly diminished CD4 counts of 48 and was diagnosed with CMV colitis.  Since then she has been followed in hematology clinic at [place] until recently.  She was noted to have anemia which was fairly long-standing.  She was initially referred for anemia in 2004 and also had a bone marrow biopsy done at that time which revealed hypercellular bone marrow with 70-80% cellularity and normal trilineage hematopoiesis and no morphologic features of MDS or lymphoproliferation.  Her anemia was attributed to her chronic underlying disease.  At the next evaluation in 2002 on 4 aggressive anemia there was no evidence of hemolysis, iron deficiency, B12 or folate deficiency.  Bone marrow biopsy was not pursued.  In summer of 2015 she had progressive fatigue, dyspnea on exertion and worsening anemia with a hemoglobin now of 9.  Workup at this time did suggest a hemolytic anemia with elevated LDH at 709, undetectable haptoglobin and elevated unconjugated bilirubin at 3.3.  Monospecific MARIKA was positive for both IgG and complement.  Cold agglutinin screen was positive with very low titer 1:64.  She was started on prednisone 60 mg daily with supplementation of iron folate and B12  starting 7/31/15.  Her prednisone has been slowly tapered and was discontinued off in January 2016.  She was last followed at [facility] on March 10, 2016 when she had stable hemoglobin.     She was followed by Dr. Matos and Dr. Summers.  Due to relapse of hemolytic anemia, she underwent another course of prednisone that was tapered off and rituximab x 4 weekly doses completed in 9/19/19.     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  She started weekly Rituxan on 7/31/20 x 4 doses, completed on 8/21/20.  She tapered off of prednisone in October 2020.     Due to relapse of her AIHA, she started prednisone again on 6/8/21 at 60 mg daily with slow taper and finished taper in August 2021.     Due to relapse of AIHA again, she started prednisone again on 11/2/21 at 70 mg daily with slow taper.  She also completed weekly Rituxan again x 4 doses 12/6/21-12/29/21.     Venofer 5/5-5/19/22.     She is now on Rituxan every 2 months and IVIG every 8 weeks if IgG <600.    Interval History: Tricia is here unaccompanied and seen in infusion.  She has been tolerating rituximab pretty well, but has noticed that she has had increased weakness and is walking slower over the last several months.  She has increased arthritis, neck and pain in the back of her knees since she had COVID in July.  She finds it harder to get out of the chair and she has just generalized weakness.  She has been working with PT over the last several months with some improvement.  She has not followed up with her primary doctor, however she does have an appoint with orthopedics next week.    Review of Systems: See interval hx. Denies fevers, chills, changes in vision, CP, abdominal pain, N/V, diarrhea, changes in urination, bleeding, bruising.     PMHx and Social Hx reviewed per EPIC.      Medications:  Current Outpatient Medications   Medication Sig Dispense Refill     cyanocobalamin (VITAMIN  B-12)  "1000 MCG tablet   3     folic acid (FOLVITE) 1 MG tablet   3     hydroCHLOROthiazide 12.5 MG tablet TAKE 1 TABLET AS NEEDED FOR EDEMA 30 tablet 2     ketoconazole (NIZORAL) 2 % external shampoo Use every other day to scalp as needed 120 mL 0     levothyroxine (SYNTHROID/LEVOTHROID) 150 MCG tablet Take 1 tablet (150 mcg) by mouth daily 90 tablet 3     omega 3 1000 MG CAPS Take 1,280 mg by mouth daily       triamcinolone (KENALOG) 0.1 % external ointment Apply topically 2 times daily         Allergies   Allergen Reactions     Doxycycline        EXAM:    /63   Pulse 77   Temp 98.7  F (37.1  C)   Resp 16   Ht 1.803 m (5' 10.98\")   Wt 63.5 kg (140 lb)   LMP  (LMP Unknown)   SpO2 99%   BMI 19.53 kg/m      GENERAL:  Female, in no acute distress.  Alert and oriented x3. Well groomed.   HEENT:  Normocephalic, atraumatic. No conjunctival injection or eye swelling.   LUNGS:  Nonlabored breathing, no cough or audible wheezing, able to speak full sentences.  MSK: Full ROM UE.    SKIN: No rash on exposed skin.   NEURO: CN grossly intact, speech normal  PSYCH: Mentation appears normal, insight and judgement intact      Labs:    09/16/24 12:41   WBC 5.4   Hemoglobin 10.7 (L)   Hematocrit 31.8 (L)   Platelet Count 164   RBC Count 3.13 (L)    (H)   MCH 34.2 (H)   MCHC 33.6   RDW 13.7   % Neutrophils 49   % Lymphocytes 40   % Monocytes 11   % Eosinophils 0   % Basophils 0   Absolute Basophils 0.0   Absolute Eosinophils 0.0   Absolute Immature Granulocytes 0.0   Absolute Lymphocytes 2.1   Absolute Monocytes 0.6   % Immature Granulocytes 0   Absolute Neutrophils 2.7   Absolute NRBCs 0.0   NRBCs per 100 WBC 0     Imaging: n/a    Assessment and Plan:  # 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 Has had multiple relapses, most recently November 2021. She completed prolonged prednisone taper and Rituxan. She is off prednisone now. Has bactrim when she is on prednisone. She is now on Rituxan " every 2 months with stable labs  - CBC reviewed. Hgb stable 10.7, platelets stable 164K.   - Continue with Rituxan every 2 months, give today, due late Nov 2024  - Will follow-up with MD in 2 months with Rituxan    # CVID  Follows with immunology as well.   - Doing IVIG every 8 weeks when IGG level is <600.  IgG 314 on 9/10/24 and given last week  - Continue monthly monitoring   - CD4 count was low, met with ID, no indication for PJP ppx at this time      # EVI  Prior ferritin was low at 6 in April 2022. S/p Venofer x 3.   - Iron studies WNL Feb 2024  - B12 and folate elevated  - Hemoglobin has been fluctuating, stable at 10.7 today     # Possible Myositis  Work-up ongoing with neurology  - LP was negative  -Thought possibly polyneuropathy   -Ongoing back and neck pain, seeing Ortho next week     # PET Findings  Recommended PET due to abnormal marrow signal on MRI. Thankfully PET without any suspicious lymph node or marrow update  - Colonic polyp benign. Had chronic inactive gastritis but otherwise nothing on EGD. Does not need further endoscopy   - Pancreas MRI with cysts <10mm.  GI referral placed in the past      Chart documentation with Dragon Voice recognition Software. Although reviewed after completion, some words and grammatical errors may remain.    30 minutes spent on the date of the encounter doing chart review, review of test results, interpretation of tests, patient visit, and documentation     Trinidad Liu PA-C  Hematology/Oncology  [facility]                  Again, thank you for allowing me to participate in the care of your patient.        Sincerely,        Trinidad Liu PA-C

## 2024-09-16 NOTE — NURSING NOTE
"Oncology Rooming Note    September 16, 2024 1:07 PM   Tricia Sosa is a 83 year old female who presents for:    Chief Complaint   Patient presents with    Oncology Clinic Visit     Initial Vitals: /63   Pulse 77   Temp 98.7  F (37.1  C)   Resp 16   Ht 1.803 m (5' 10.98\")   Wt 63.5 kg (140 lb)   LMP  (LMP Unknown)   SpO2 99%   BMI 19.53 kg/m   Estimated body mass index is 19.53 kg/m  as calculated from the following:    Height as of this encounter: 1.803 m (5' 10.98\").    Weight as of this encounter: 63.5 kg (140 lb). Body surface area is 1.78 meters squared.  Mild Pain (3) Comment: Data Unavailable   No LMP recorded (lmp unknown). Patient is postmenopausal.  Allergies reviewed: Yes  Medications reviewed: Yes    Medications: Medication refills not needed today.  Pharmacy name entered into "[employer]:    [place], [place] - [address] [id number] [place], [place] - [address]    Frailty Screening:   Is the patient here for a new oncology consult visit in cancer care? 2. No      Clinical concerns: Follow up       Hawa Victor MA              "

## 2024-09-16 NOTE — PROGRESS NOTES
Oncology/Hematology Visit Note    Sep 16, 2024    Reason for visit: Follow up of CVID, autoimmune hemolytic anemia, iron deficiency      Oncology HPI: Tricia Sosa is a 83 year old female who presents with autoimmune hemolytic anemia and IgA deficiency. She was previously seen at [facility].     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  She was very sick at the time and had severe diarrhea.  She lost about 40 pounds in weight.  The workup revealed that she had markedly diminished CD4 counts of 48 and was diagnosed with CMV colitis.  Since then she has been followed in hematology clinic at [place] until recently.  She was noted to have anemia which was fairly long-standing.  She was initially referred for anemia in 2004 and also had a bone marrow biopsy done at that time which revealed hypercellular bone marrow with 70-80% cellularity and normal trilineage hematopoiesis and no morphologic features of MDS or lymphoproliferation.  Her anemia was attributed to her chronic underlying disease.  At the next evaluation in 2002 on 4 aggressive anemia there was no evidence of hemolysis, iron deficiency, B12 or folate deficiency.  Bone marrow biopsy was not pursued.  In summer of 2015 she had progressive fatigue, dyspnea on exertion and worsening anemia with a hemoglobin now of 9.  Workup at this time did suggest a hemolytic anemia with elevated LDH at 709, undetectable haptoglobin and elevated unconjugated bilirubin at 3.3.  Monospecific MARIKA was positive for both IgG and complement.  Cold agglutinin screen was positive with very low titer 1:64.  She was started on prednisone 60 mg daily with supplementation of iron folate and B12 starting 7/31/15.  Her prednisone has been slowly tapered and was discontinued off in January 2016.  She was last followed at [facility] on March 10, 2016 when she had stable hemoglobin.     She was followed by Dr. Matos and Dr. Summers.  Due to relapse of  hemolytic anemia, she underwent another course of prednisone that was tapered off and rituximab x 4 weekly doses completed in 9/19/19.     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  She started weekly Rituxan on 7/31/20 x 4 doses, completed on 8/21/20.  She tapered off of prednisone in October 2020.     Due to relapse of her AIHA, she started prednisone again on 6/8/21 at 60 mg daily with slow taper and finished taper in August 2021.     Due to relapse of AIHA again, she started prednisone again on 11/2/21 at 70 mg daily with slow taper.  She also completed weekly Rituxan again x 4 doses 12/6/21-12/29/21.     Venofer 5/5-5/19/22.     She is now on Rituxan every 2 months and IVIG every 8 weeks if IgG <600.    Interval History: Tricia is here unaccompanied and seen in infusion.  She has been tolerating rituximab pretty well, but has noticed that she has had increased weakness and is walking slower over the last several months.  She has increased arthritis, neck and pain in the back of her knees since she had COVID in July.  She finds it harder to get out of the chair and she has just generalized weakness.  She has been working with PT over the last several months with some improvement.  She has not followed up with her primary doctor, however she does have an appoint with orthopedics next week.    Review of Systems: See interval hx. Denies fevers, chills, changes in vision, CP, abdominal pain, N/V, diarrhea, changes in urination, bleeding, bruising.     PMHx and Social Hx reviewed per EPIC.      Medications:  Current Outpatient Medications   Medication Sig Dispense Refill    cyanocobalamin (VITAMIN  B-12) 1000 MCG tablet   3    folic acid (FOLVITE) 1 MG tablet   3    hydroCHLOROthiazide 12.5 MG tablet TAKE 1 TABLET AS NEEDED FOR EDEMA 30 tablet 2    ketoconazole (NIZORAL) 2 % external shampoo Use every other day to scalp as needed 120 mL 0    levothyroxine  "(SYNTHROID/LEVOTHROID) 150 MCG tablet Take 1 tablet (150 mcg) by mouth daily 90 tablet 3    omega 3 1000 MG CAPS Take 1,280 mg by mouth daily      triamcinolone (KENALOG) 0.1 % external ointment Apply topically 2 times daily         Allergies   Allergen Reactions    Doxycycline        EXAM:    /63   Pulse 77   Temp 98.7  F (37.1  C)   Resp 16   Ht 1.803 m (5' 10.98\")   Wt 63.5 kg (140 lb)   LMP  (LMP Unknown)   SpO2 99%   BMI 19.53 kg/m      GENERAL:  Female, in no acute distress.  Alert and oriented x3. Well groomed.   HEENT:  Normocephalic, atraumatic. No conjunctival injection or eye swelling.   LUNGS:  Nonlabored breathing, no cough or audible wheezing, able to speak full sentences.  MSK: Full ROM UE.    SKIN: No rash on exposed skin.   NEURO: CN grossly intact, speech normal  PSYCH: Mentation appears normal, insight and judgement intact      Labs:    09/16/24 12:41   WBC 5.4   Hemoglobin 10.7 (L)   Hematocrit 31.8 (L)   Platelet Count 164   RBC Count 3.13 (L)    (H)   MCH 34.2 (H)   MCHC 33.6   RDW 13.7   % Neutrophils 49   % Lymphocytes 40   % Monocytes 11   % Eosinophils 0   % Basophils 0   Absolute Basophils 0.0   Absolute Eosinophils 0.0   Absolute Immature Granulocytes 0.0   Absolute Lymphocytes 2.1   Absolute Monocytes 0.6   % Immature Granulocytes 0   Absolute Neutrophils 2.7   Absolute NRBCs 0.0   NRBCs per 100 WBC 0     Imaging: n/a    Assessment and Plan:  # Warm Autoimmune Hemolytic Anemia  (positive MARIKA to IgG and complement). Has had multiple relapses, most recently November 2021. She completed prolonged prednisone taper and Rituxan. She is off prednisone now. Has bactrim when she is on prednisone. She is now on Rituxan every 2 months with stable labs  - CBC reviewed. Hgb stable 10.7, platelets stable 164K.   - Continue with Rituxan every 2 months, give today, due late Nov 2024  - Will follow-up with MD in 2 months with Rituxan    # CVID  Follows with immunology as well.   - " Doing IVIG every 8 weeks when IGG level is <600.  IgG 314 on 9/10/24 and given last week  - Continue monthly monitoring   - CD4 count was low, met with ID, no indication for PJP ppx at this time      # EVI  Prior ferritin was low at 6 in April 2022. S/p Venofer x 3.   - Iron studies WNL Feb 2024  - B12 and folate elevated  - Hemoglobin has been fluctuating, stable at 10.7 today     # Possible Myositis  Work-up ongoing with neurology  - LP was negative  -Thought possibly polyneuropathy   -Ongoing back and neck pain, seeing Ortho next week     # PET Findings  Recommended PET due to abnormal marrow signal on MRI. Thankfully PET without any suspicious lymph node or marrow update  - Colonic polyp benign. Had chronic inactive gastritis but otherwise nothing on EGD. Does not need further endoscopy   - Pancreas MRI with cysts <10mm.  GI referral placed in the past      Chart documentation with Dragon Voice recognition Software. Although reviewed after completion, some words and grammatical errors may remain.    30 minutes spent on the date of the encounter doing chart review, review of test results, interpretation of tests, patient visit, and documentation     Trinidad Liu PA-C  Hematology/Oncology  [facility]

## 2024-09-18 ASSESSMENT — KOOS JR
RISING FROM SITTING: EXTREME
STRAIGHTENING KNEE FULLY: MODERATE
BENDING TO THE FLOOR TO PICK UP OBJECT: EXTREME
HOW SEVERE IS YOUR KNEE STIFFNESS AFTER FIRST WAKING IN MORNING: MODERATE
STANDING UPRIGHT: MODERATE
GOING UP OR DOWN STAIRS: SEVERE
TWISING OR PIVOTING ON KNEE: MODERATE
KOOS JR SCORING: 39.63

## 2024-09-23 ENCOUNTER — OFFICE VISIT (OUTPATIENT)
Dept: ORTHOPEDICS | Facility: CLINIC | Age: 84
End: 2024-09-23
Payer: MEDICARE

## 2024-09-23 VITALS — BODY MASS INDEX: 19.6 KG/M2 | HEIGHT: 71 IN | WEIGHT: 140 LBS

## 2024-09-23 DIAGNOSIS — M17.0 OSTEOARTHRITIS OF PATELLOFEMORAL JOINTS OF BOTH KNEES: ICD-10-CM

## 2024-09-23 DIAGNOSIS — G89.29 CHRONIC PAIN OF LEFT KNEE: ICD-10-CM

## 2024-09-23 DIAGNOSIS — M19.049 HAND ARTHRITIS: Primary | ICD-10-CM

## 2024-09-23 DIAGNOSIS — M25.562 CHRONIC PAIN OF LEFT KNEE: ICD-10-CM

## 2024-09-23 PROCEDURE — 99213 OFFICE O/P EST LOW 20 MIN: CPT | Mod: 25 | Performed by: STUDENT IN AN ORGANIZED HEALTH CARE EDUCATION/TRAINING PROGRAM

## 2024-09-23 PROCEDURE — 20610 DRAIN/INJ JOINT/BURSA W/O US: CPT | Mod: 50 | Performed by: STUDENT IN AN ORGANIZED HEALTH CARE EDUCATION/TRAINING PROGRAM

## 2024-09-23 RX ORDER — LIDOCAINE HYDROCHLORIDE 10 MG/ML
7 INJECTION, SOLUTION INFILTRATION; PERINEURAL
Status: SHIPPED | OUTPATIENT
Start: 2024-09-23

## 2024-09-23 RX ORDER — TRIAMCINOLONE ACETONIDE 40 MG/ML
40 INJECTION, SUSPENSION INTRA-ARTICULAR; INTRAMUSCULAR
Status: SHIPPED | OUTPATIENT
Start: 2024-09-23

## 2024-09-23 RX ADMIN — TRIAMCINOLONE ACETONIDE 40 MG: 40 INJECTION, SUSPENSION INTRA-ARTICULAR; INTRAMUSCULAR at 14:26

## 2024-09-23 RX ADMIN — LIDOCAINE HYDROCHLORIDE 7 ML: 10 INJECTION, SOLUTION INFILTRATION; PERINEURAL at 14:26

## 2024-09-23 NOTE — PROGRESS NOTES
CC: Bilateral patellofemoral    HPI: Patient is an 83-year-old female known to my practice with bilateral patellofemoral osteoarthritis.  For full history and physical please see my note from October 2023.  At that time we performed bilateral intra-articular corticosteroid injection.  She states this provide decent pain relief.  She is now having pain in the anterior aspect of her knees again.  She also feels that her knees are weak and is interested in trialing physical therapy again.  Denies any new injury    She also tells me she has increasing pain in both hands at her CMC joint and MCP joint    Objective:   PE:  B/L LE: No lacerations noted.  Trace joint effusion.  No pain to palpation over the medial or lateral joint line.  Patellar grind test positive for crepitus and mild pain.  0 to 130 degrees knee flexion.  Stable to varus and valgus stress at 0 and 30 days knee flexion.  Stable anterior and posterior drawer    Procedure:   Written informed consent for bilateral knee intra-articular injection was obtained from the patient after discussing the risk and benefits of the procedure.  Risk and benefits including but not limited to bleeding, infection, failure to cure pain and allergic reaction were discussed.  I first began with the right knee.  The anterior inferolateral portal was identified by palpation of bony landmarks.  The skin was cleaned with iodine solution.  Under sterile technique the anterior inferolateral portal was utilized to inject the entirety of the steroid and lidocaine solution.  Soft dressings were applied.  This process was then repeated for the left knee.  Patient tolerated the procedure without difficulty.  I counseled the patient on signs and symptoms of allergic reaction and infection.    Large Joint Injection/Arthocentesis: bilateral knee    Date/Time: 9/23/2024 2:26 PM    Performed by: Rashid Cardoso MD  Authorized by: Rashid Cardoso MD    Indications:  Pain  Needle Size:  22  G  Guidance: landmark guided    Approach:  Anterolateral  Location:  Knee  Laterality:  Bilateral      Medications (Right):  40 mg triamcinolone 40 MG/ML; 7 mL lidocaine 1 %  Medications (Left):  40 mg triamcinolone 40 MG/ML; 7 mL lidocaine 1 %  Outcome:  Tolerated well, no immediate complications  Procedure discussed: discussed risks, benefits, and alternatives    Consent Given by:  Patient  Timeout: timeout called immediately prior to procedure    Prep: patient was prepped and draped in usual sterile fashion      A/P:  Patient is an 83-year-old female seen here today in follow-up for bilateral knee patellofemoral osteoarthritis.  She is also having subjective weakness in both legs and difficulty with ambulation endurance secondary to weakness.  Today she is interested in retrialing bilateral intra-articular corticosteroid injections.  I think that is very reasonable.  I discussed the risks and benefits of intra-articular corticosteroid injection including but not limited to allergic reaction, infection, and failure to cure pain.  I described the signs symptoms of allergic reaction and septic arthritis.  Bilateral corticosteroid injections were performed as highlighted above.  She tolerated the procedure without difficulty.      She would also like physical therapy for bilateral leg strengthening gait endurance.  I think that is very reasonable.  I am happy to prescribe that for her.  Finally, I prescribed her topical Voltaren to outpatient pharmacy for her bilateral hand osteoarthritis.  She can follow-up with me as needed in the future for any orthopedic concerns.    Rashid Cardoso MD    [facility]   Department of Orthopedic Surgery      Disclaimer: This note consists of symbols derived from keyboarding, dictation and/or voice recognition software. As a result, there may be errors in the script that have gone undetected. Please consider this when interpreting information found in  this chart.

## 2024-09-23 NOTE — LETTER
9/23/2024      Triica oSsa  [address]  [facility] [address]      Dear Colleague,    Thank you for referring your patient, Tricia Sosa, to the [facility] [place]. Please see a copy of my visit note below.    CC: Bilateral patellofemoral    HPI: Patient is an 83-year-old female known to my practice with bilateral patellofemoral osteoarthritis.  For full history and physical please see my note from October 2023.  At that time we performed bilateral intra-articular corticosteroid injection.  She states this provide decent pain relief.  She is now having pain in the anterior aspect of her knees again.  She also feels that her knees are weak and is interested in trialing physical therapy again.  Denies any new injury    She also tells me she has increasing pain in both hands at her CMC joint and MCP joint    Objective:   PE:  B/L LE: No lacerations noted.  Trace joint effusion.  No pain to palpation over the medial or lateral joint line.  Patellar grind test positive for crepitus and mild pain.  0 to 130 degrees knee flexion.  Stable to varus and valgus stress at 0 and 30 days knee flexion.  Stable anterior and posterior drawer    Procedure:   Written informed consent for bilateral knee intra-articular injection was obtained from the patient after discussing the risk and benefits of the procedure.  Risk and benefits including but not limited to bleeding, infection, failure to cure pain and allergic reaction were discussed.  I first began with the right knee.  The anterior inferolateral portal was identified by palpation of bony landmarks.  The skin was cleaned with iodine solution.  Under sterile technique the anterior inferolateral portal was utilized to inject the entirety of the steroid and lidocaine solution.  Soft dressings were applied.  This process was then repeated for the left knee.  Patient tolerated the procedure without difficulty.  I counseled the patient on  signs and symptoms of allergic reaction and infection.    Large Joint Injection/Arthocentesis: bilateral knee    Date/Time: 9/23/2024 2:26 PM    Performed by: Rashid Cardoso MD  Authorized by: Rashid Cardoso MD    Indications:  Pain  Needle Size:  22 G  Guidance: landmark guided    Approach:  Anterolateral  Location:  Knee  Laterality:  Bilateral      Medications (Right):  40 mg triamcinolone 40 MG/ML; 7 mL lidocaine 1 %  Medications (Left):  40 mg triamcinolone 40 MG/ML; 7 mL lidocaine 1 %  Outcome:  Tolerated well, no immediate complications  Procedure discussed: discussed risks, benefits, and alternatives    Consent Given by:  Patient  Timeout: timeout called immediately prior to procedure    Prep: patient was prepped and draped in usual sterile fashion      A/P:  Patient is an 83-year-old female seen here today in follow-up for bilateral knee patellofemoral osteoarthritis.  She is also having subjective weakness in both legs and difficulty with ambulation endurance secondary to weakness.  Today she is interested in retrialing bilateral intra-articular corticosteroid injections.  I think that is very reasonable.  I discussed the risks and benefits of intra-articular corticosteroid injection including but not limited to allergic reaction, infection, and failure to cure pain.  I described the signs symptoms of allergic reaction and septic arthritis.  Bilateral corticosteroid injections were performed as highlighted above.  She tolerated the procedure without difficulty.      She would also like physical therapy for bilateral leg strengthening gait endurance.  I think that is very reasonable.  I am happy to prescribe that for her.  Finally, I prescribed her topical Voltaren to outpatient pharmacy for her bilateral hand osteoarthritis.  She can follow-up with me as needed in the future for any orthopedic concerns.    Rashid Cardoso MD    [facility]   Department of Orthopedic  Surgery      Disclaimer: This note consists of symbols derived from keyboarding, dictation and/or voice recognition software. As a result, there may be errors in the script that have gone undetected. Please consider this when interpreting information found in this chart.        Again, thank you for allowing me to participate in the care of your patient.        Sincerely,        Rashid Cardoso MD

## 2024-09-23 NOTE — PATIENT INSTRUCTIONS
[facility]   [address] [place] [address], [address]  [place] [address] [address] [place] [address]   Appointments: [phone] Appointments: [phone]   Fax: [phone] Fax: [phone]       1. Hand arthritis    2. Chronic pain of left knee        Call my office with any questions or concerns, [phone].

## 2024-09-27 DIAGNOSIS — L30.9 DERMATITIS: ICD-10-CM

## 2024-09-30 RX ORDER — KETOCONAZOLE 20 MG/ML
SHAMPOO TOPICAL
Qty: 120 ML | Refills: 0 | Status: SHIPPED | OUTPATIENT
Start: 2024-09-30

## 2024-10-21 ASSESSMENT — ACTIVITIES OF DAILY LIVING (ADL)
SIT WITH YOUR KNEE BENT: ACTIVITY IS NOT DIFFICULT
SWELLING: I DO NOT HAVE THE SYMPTOM
PAIN: I HAVE THE SYMPTOM BUT IT DOES NOT AFFECT MY ACTIVITY
SIT WITH YOUR KNEE BENT: ACTIVITY IS NOT DIFFICULT
GO DOWN STAIRS: ACTIVITY IS VERY DIFFICULT
AS_A_RESULT_OF_YOUR_KNEE_INJURY,_HOW_WOULD_YOU_RATE_YOUR_CURRENT_LEVEL_OF_DAILY_ACTIVITY?: SEVERELY ABNORMAL
AS_A_RESULT_OF_YOUR_KNEE_INJURY,_HOW_WOULD_YOU_RATE_YOUR_CURRENT_LEVEL_OF_DAILY_ACTIVITY?: SEVERELY ABNORMAL
SQUAT: I AM UNABLE TO DO THE ACTIVITY
WEAKNESS: THE SYMPTOM AFFECTS MY ACTIVITY SEVERELY
HOW_WOULD_YOU_RATE_THE_OVERALL_FUNCTION_OF_YOUR_KNEE_DURING_YOUR_USUAL_DAILY_ACTIVITIES?: SEVERELY ABNORMAL
STAND: ACTIVITY IS VERY DIFFICULT
GIVING WAY, BUCKLING OR SHIFTING OF KNEE: THE SYMPTOM AFFECTS MY ACTIVITY SLIGHTLY
RISE FROM A CHAIR: ACTIVITY IS VERY DIFFICULT
WALK: ACTIVITY IS VERY DIFFICULT
LIMPING: I DO NOT HAVE THE SYMPTOM
GO UP STAIRS: ACTIVITY IS VERY DIFFICULT
STIFFNESS: THE SYMPTOM AFFECTS MY ACTIVITY MODERATELY
RAW_SCORE: 30
GO UP STAIRS: ACTIVITY IS VERY DIFFICULT
PAIN: I HAVE THE SYMPTOM BUT IT DOES NOT AFFECT MY ACTIVITY
STAND: ACTIVITY IS VERY DIFFICULT
KNEE_ACTIVITY_OF_DAILY_LIVING_SUM: 30
WALK: ACTIVITY IS VERY DIFFICULT
WEAKNESS: THE SYMPTOM AFFECTS MY ACTIVITY SEVERELY
SWELLING: I DO NOT HAVE THE SYMPTOM
KNEEL ON THE FRONT OF YOUR KNEE: I AM UNABLE TO DO THE ACTIVITY
HOW_WOULD_YOU_RATE_THE_CURRENT_FUNCTION_OF_YOUR_KNEE_DURING_YOUR_USUAL_DAILY_ACTIVITIES_ON_A_SCALE_FROM_0_TO_100_WITH_100_BEING_YOUR_LEVEL_OF_KNEE_FUNCTION_PRIOR_TO_YOUR_INJURY_AND_0_BEING_THE_INABILITY_TO_PERFORM_ANY_OF_YOUR_USUAL_DAILY_ACTIVITIES?: 25
LIMPING: I DO NOT HAVE THE SYMPTOM
HOW_WOULD_YOU_RATE_THE_CURRENT_FUNCTION_OF_YOUR_KNEE_DURING_YOUR_USUAL_DAILY_ACTIVITIES_ON_A_SCALE_FROM_0_TO_100_WITH_100_BEING_YOUR_LEVEL_OF_KNEE_FUNCTION_PRIOR_TO_YOUR_INJURY_AND_0_BEING_THE_INABILITY_TO_PERFORM_ANY_OF_YOUR_USUAL_DAILY_ACTIVITIES?: 25
KNEE_ACTIVITY_OF_DAILY_LIVING_SCORE: 42.86
STIFFNESS: THE SYMPTOM AFFECTS MY ACTIVITY MODERATELY
RISE FROM A CHAIR: ACTIVITY IS VERY DIFFICULT
PLEASE_INDICATE_YOR_PRIMARY_REASON_FOR_REFERRAL_TO_THERAPY:: KNEE
HOW_WOULD_YOU_RATE_THE_OVERALL_FUNCTION_OF_YOUR_KNEE_DURING_YOUR_USUAL_DAILY_ACTIVITIES?: SEVERELY ABNORMAL
GIVING WAY, BUCKLING OR SHIFTING OF KNEE: THE SYMPTOM AFFECTS MY ACTIVITY SLIGHTLY
KNEEL ON THE FRONT OF YOUR KNEE: I AM UNABLE TO DO THE ACTIVITY
GO DOWN STAIRS: ACTIVITY IS VERY DIFFICULT
SQUAT: I AM UNABLE TO DO THE ACTIVITY

## 2024-10-22 ENCOUNTER — ANCILLARY PROCEDURE (OUTPATIENT)
Dept: GENERAL RADIOLOGY | Facility: CLINIC | Age: 84
End: 2024-10-22
Attending: STUDENT IN AN ORGANIZED HEALTH CARE EDUCATION/TRAINING PROGRAM
Payer: MEDICARE

## 2024-10-22 ENCOUNTER — OFFICE VISIT (OUTPATIENT)
Dept: ORTHOPEDICS | Facility: CLINIC | Age: 84
End: 2024-10-22
Payer: MEDICARE

## 2024-10-22 VITALS — HEIGHT: 70 IN | WEIGHT: 140 LBS | BODY MASS INDEX: 20.04 KG/M2

## 2024-10-22 DIAGNOSIS — M79.641 BILATERAL HAND PAIN: ICD-10-CM

## 2024-10-22 DIAGNOSIS — M79.642 BILATERAL HAND PAIN: ICD-10-CM

## 2024-10-22 DIAGNOSIS — M19.049 HAND ARTHRITIS: Primary | ICD-10-CM

## 2024-10-22 PROCEDURE — 73130 X-RAY EXAM OF HAND: CPT | Mod: TC | Performed by: RADIOLOGY

## 2024-10-22 PROCEDURE — 99214 OFFICE O/P EST MOD 30 MIN: CPT | Performed by: STUDENT IN AN ORGANIZED HEALTH CARE EDUCATION/TRAINING PROGRAM

## 2024-10-22 RX ORDER — CELECOXIB 100 MG/1
100-200 CAPSULE ORAL 2 TIMES DAILY
Qty: 90 CAPSULE | Refills: 1 | Status: SHIPPED | OUTPATIENT
Start: 2024-10-22

## 2024-10-22 NOTE — PROGRESS NOTES
Orthopaedic Surgery Hand and Upper Extremity Clinic H&P Note:  Date: Oct 22, 2024     Patient Name: Tricia Sosa  MRN: [id number]    Consult requested by: Rashid Cardoso    CHIEF COMPLAINT: Generalized bilateral hand pain    Dominant Hand:right   Occupation: retired       HPI:  Ms. Tricia Sosa is a 83 year old female right  hand dominant who presents with generalized bilateral hand pain.  While she has had weakness for years, she for started having pain in August.  It for started in her knees, affected her neck, and then spread to her hands.  She describes soreness at the base of the palm at the hypothenar and thenar eminences.  She wonders if this is because she has to use the hands to push herself out of a seated position.  The patient also describes pain and swelling throughout multiple joints, worse in the left middle finger PIP.  Left hand is overall worse than the right.  She also endorses some dorsal swelling over the wrist.  She states that she has had lower extremity weakness for many years prior to the onset of pain.  She is using a walker.  She did notice that COVID made symptoms worse in August.  She does have pain at night but no numbness or tingling.  Tylenol and diclofenac have not helped.  She saw Dr. Cardoso September 2024 and received bilateral knee steroid injections.    She has a positive medical history for common variable immunodeficiency.  She also has autoimmune hemolytic anemia.  She takes Privigen and rituximab infusions every 8 weeks.    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    Hemorrhage Subdural Trauma Without LOC Active    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, and posterior vitreous detachment    Urinary tract infection, site not specified     Recurrent UTI's       PAST SURGICAL HISTORY:  Past Surgical History:   Procedure Laterality Date    BIOPSY MUSCLE DIAGNOSTIC (LOCATION) Right 1/16/2024    Procedure: BIOPSY, MUSCLE - RIGHT THIGH;  Surgeon: Dickson Madrid MD;  Location: [facility] OR    COLONOSCOPY N/A 4/26/2016    Procedure: COLONOSCOPY;  Surgeon: Dontae Del Rio MD;  Location:  GI    COLONOSCOPY N/A 2/22/2024    Procedure: Colonoscopy with polypectomy by using cold snare and two hemoclips applied;  Surgeon: Dontae Del Rio MD;  Location:  GI    ENT SURGERY  1985    Thyroid lobectomy    ESOPHAGOSCOPY, GASTROSCOPY, DUODENOSCOPY (EGD), COMBINED N/A 2/22/2024    Procedure: ESOPHAGOGASTRODUODENOSCOPY, WITH BIOPSY by using cold forcep;  Surgeon: Dontae Del Rio MD;  Location:  GI    EYE SURGERY Bilateral 04/20/2021    Cataract Surgery    HC CYSTOSCOPY,INSERT URETERAL STENT      Ureteral stent insertion    HC FLEX SIGMOIDOSCOPY W BIOPSY  5/30/00    HC LAPAROSCOPY, SURGICAL; CHOLECYSTECTOMY  1992     THYROIDECTOMY      LIGATION OF HEMORRHOID(S)      Hemorrhoidectomy    UPPER GI ENDOSCOPY,BIOPSY  10/01/01    Z LIGATE FALLOPIAN TUBE      ZZ COLONOSCOPY W BIOPSY  4/26/00    ZZ COLONOSCOPY W BIOPSY  10/8/03    ZZ COLONOSCOPY W BIOPSY  6/27/05    REPEAT IN 5 YEARS       MEDICATIONS:  Current Outpatient Medications   Medication Sig Dispense Refill    cyanocobalamin (VITAMIN  B-12) 1000 MCG tablet   3    diclofenac (VOLTAREN) 1 % topical gel Apply 2 g topically 4 times daily. 120 g 0    folic acid (FOLVITE) 1 MG tablet   3    hydroCHLOROthiazide 12.5 MG tablet TAKE 1 TABLET AS NEEDED FOR  EDEMA 30 tablet 2    ketoconazole (NIZORAL) 2 % external shampoo Use every other day to scalp as needed 120 mL 0    levothyroxine (SYNTHROID/LEVOTHROID) 150 MCG tablet Take 1 tablet (150 mcg) by mouth daily 90 tablet 3    omega 3 1000 MG CAPS Take 1,280 mg by mouth daily      triamcinolone (KENALOG) 0.1 % external ointment Apply topically 2 times daily       Current Facility-Administered Medications   Medication Dose Route Frequency Provider Last Rate Last Admin    lidocaine 1 % injection 7 mL  7 mL      7 mL at 09/23/24 1426    lidocaine 1 % injection 7 mL  7 mL      7 mL at 09/23/24 1426    triamcinolone (KENALOG-40) injection 40 mg  40 mg      40 mg at 09/23/24 1426    triamcinolone (KENALOG-40) injection 40 mg  40 mg      40 mg at 09/23/24 1426       ALLERGIES:     Allergies   Allergen Reactions    Doxycycline        FAMILY HISTORY:  No pertinent family history    SOCIAL HISTORY:  Social History     Tobacco Use    Smoking status: Never     Passive exposure: Never    Smokeless tobacco: Never   Vaping Use    Vaping status: Never Used   Substance Use Topics    Alcohol use: Not Currently     Comment: 1 a day at most    Drug use: No       The patient's past medical, family, and social history was reviewed and confirmed.    REVIEW OF SYMPTOMS:      General: Negative   Eyes: Negative   Ear, Nose and Throat: Negative   Respiratory: Negative   Cardiovascular: Negative   Gastrointestinal: Negative   Genito-urinary: Negative   Musculoskeletal: see HPI  Neurological: Negative   Psychological: Negative  HEME: Negative   ENDO: Negative   SKIN: Negative    VITALS:  There were no vitals filed for this visit.    EXAM:  General: NAD, A&Ox3  HEENT: NC/AT  CV: RRR by peripheral pulse  Pulmonary: Non-labored breathing on RA  BUE:  Swelling over the dorsal left wrist  Diffuse enlargement of multiple IP joints, worse in the left middle and ring finger PIP joints and right ring PIP joint and small finger DIP joint  Left middle PIP and  right small finger DIP are quite tender  Mild swelling of the fingers  Intact FDP, FDS, EDC, diminished active range of motion when making a fist, worse on the left  No tenderness to palpation at the CMC or STT joints  Sensation intact to light touch all distributions  Warm and well-perfused, capillary refill less than 2 seconds     IMAGING:    X-rays of bilateral hands obtained today reviewed by me.  Advanced degenerative change through multiple IP joints, worse at the joints stated above in the exam.  Advanced STT arthrosis as well.    I have personally reviewed the above images and labs.         IMPRESSION AND RECOMMENDATIONS:  Ms. Tricia Sosa is a 83 year old female right hand dominant with bilateral generalized hand pain    While the radiographs appear consistent with osteoarthritis, given her polyarthralgia and simultaneous onset, I am suspicious that she has an inflammatory or autoimmune component.  Could have been exacerbated by COVID.    I therefore recommended a referral to rheumatology for further workup.  This referral has been placed.  I asked her to contact her PCP to see if there are any groups in the [facility] as her rheumatologist primarily are based out of [place].    Since the diclofenac has not been working, we will transition to Celebrex 100-200 mg twice daily.  This was prescribed and sent to pharmacy.    All questions answered.  The patient voiced understanding and agreement.  Follow-up with me as needed.    Isidro Romeo MD    Hand, Upper Extremity & Microvascular Surgery  Department of Orthopaedic Surgery  [facility]

## 2024-10-22 NOTE — LETTER
10/22/2024      Tricia Sosa  [address] [facility] [address]      Dear Colleague,    Thank you for referring your patient, Tricia Sosa, to the [facility] [place]. Please see a copy of my visit note below.    Orthopaedic Surgery Hand and Upper Extremity Clinic H&P Note:  Date: Oct 22, 2024     Patient Name: Tricia Sosa  MRN: [id number]    Consult requested by: Rashid Cardoso    CHIEF COMPLAINT: Generalized bilateral hand pain    Dominant Hand:right   Occupation: retired       HPI:  Ms. Tricia Sosa is a 83 year old female right  hand dominant who presents with generalized bilateral hand pain.  While she has had weakness for years, she for started having pain in August.  It for started in her knees, affected her neck, and then spread to her hands.  She describes soreness at the base of the palm at the hypothenar and thenar eminences.  She wonders if this is because she has to use the hands to push herself out of a seated position.  The patient also describes pain and swelling throughout multiple joints, worse in the left middle finger PIP.  Left hand is overall worse than the right.  She also endorses some dorsal swelling over the wrist.  She states that she has had lower extremity weakness for many years prior to the onset of pain.  She is using a walker.  She did notice that COVID made symptoms worse in August.  She does have pain at night but no numbness or tingling.  Tylenol and diclofenac have not helped.  She saw Dr. Cardoso September 2024 and received bilateral knee steroid injections.    She has a positive medical history for common variable immunodeficiency.  She also has autoimmune hemolytic anemia.  She takes Privigen and rituximab infusions every 8 weeks.    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    Hemorrhage Subdural Trauma Without LOC Active     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, and posterior vitreous detachment     Urinary tract infection, site not specified     Recurrent UTI's       PAST SURGICAL HISTORY:  Past Surgical History:   Procedure Laterality Date     BIOPSY MUSCLE DIAGNOSTIC (LOCATION) Right 1/16/2024    Procedure: BIOPSY, MUSCLE - RIGHT THIGH;  Surgeon: Dickson Madrid MD;  Location: [facility] OR     COLONOSCOPY N/A 4/26/2016    Procedure: COLONOSCOPY;  Surgeon: Dontae Del Rio MD;  Location:  GI     COLONOSCOPY N/A 2/22/2024    Procedure: Colonoscopy with polypectomy by using cold snare and two hemoclips applied;  Surgeon: Dontae Del Rio MD;  Location:  GI     ENT SURGERY  1985    Thyroid lobectomy     ESOPHAGOSCOPY, GASTROSCOPY, DUODENOSCOPY (EGD), COMBINED N/A 2/22/2024    Procedure: ESOPHAGOGASTRODUODENOSCOPY, WITH BIOPSY by using cold forcep;  Surgeon: Dontae Del Rio MD;  Location:  GI     EYE SURGERY Bilateral 04/20/2021    Cataract Surgery     HC CYSTOSCOPY,INSERT URETERAL STENT      Ureteral stent insertion     HC FLEX SIGMOIDOSCOPY W BIOPSY  5/30/00      LAPAROSCOPY, SURGICAL; CHOLECYSTECTOMY  1992      THYROIDECTOMY       LIGATION OF HEMORRHOID(S)      Hemorrhoidectomy     UPPER GI ENDOSCOPY,BIOPSY  10/01/01     [facility] LIGATE FALLOPIAN TUBE       [facility] COLONOSCOPY W BIOPSY  4/26/00     ZZ COLONOSCOPY W BIOPSY  10/8/03     ZZ COLONOSCOPY W BIOPSY  6/27/05    REPEAT IN 5 YEARS       MEDICATIONS:  Current Outpatient  Medications   Medication Sig Dispense Refill     cyanocobalamin (VITAMIN  B-12) 1000 MCG tablet   3     diclofenac (VOLTAREN) 1 % topical gel Apply 2 g topically 4 times daily. 120 g 0     folic acid (FOLVITE) 1 MG tablet   3     hydroCHLOROthiazide 12.5 MG tablet TAKE 1 TABLET AS NEEDED FOR EDEMA 30 tablet 2     ketoconazole (NIZORAL) 2 % external shampoo Use every other day to scalp as needed 120 mL 0     levothyroxine (SYNTHROID/LEVOTHROID) 150 MCG tablet Take 1 tablet (150 mcg) by mouth daily 90 tablet 3     omega 3 1000 MG CAPS Take 1,280 mg by mouth daily       triamcinolone (KENALOG) 0.1 % external ointment Apply topically 2 times daily       Current Facility-Administered Medications   Medication Dose Route Frequency Provider Last Rate Last Admin     lidocaine 1 % injection 7 mL  7 mL      7 mL at 09/23/24 1426     lidocaine 1 % injection 7 mL  7 mL      7 mL at 09/23/24 1426     triamcinolone (KENALOG-40) injection 40 mg  40 mg      40 mg at 09/23/24 1426     triamcinolone (KENALOG-40) injection 40 mg  40 mg      40 mg at 09/23/24 1426       ALLERGIES:     Allergies   Allergen Reactions     Doxycycline        FAMILY HISTORY:  No pertinent family history    SOCIAL HISTORY:  Social History     Tobacco Use     Smoking status: Never     Passive exposure: Never     Smokeless tobacco: Never   Vaping Use     Vaping status: Never Used   Substance Use Topics     Alcohol use: Not Currently     Comment: 1 a day at most     Drug use: No       The patient's past medical, family, and social history was reviewed and confirmed.    REVIEW OF SYMPTOMS:      General: Negative   Eyes: Negative   Ear, Nose and Throat: Negative   Respiratory: Negative   Cardiovascular: Negative   Gastrointestinal: Negative   Genito-urinary: Negative   Musculoskeletal: see HPI  Neurological: Negative   Psychological: Negative  HEME: Negative   ENDO: Negative   SKIN: Negative    VITALS:  There were no vitals filed for this visit.    EXAM:  General:  NAD, A&Ox3  HEENT: NC/AT  CV: RRR by peripheral pulse  Pulmonary: Non-labored breathing on RA  BUE:  Swelling over the dorsal left wrist  Diffuse enlargement of multiple IP joints, worse in the left middle and ring finger PIP joints and right ring PIP joint and small finger DIP joint  Left middle PIP and right small finger DIP are quite tender  Mild swelling of the fingers  Intact FDP, FDS, EDC, diminished active range of motion when making a fist, worse on the left  No tenderness to palpation at the CMC or STT joints  Sensation intact to light touch all distributions  Warm and well-perfused, capillary refill less than 2 seconds     IMAGING:    X-rays of bilateral hands obtained today reviewed by me.  Advanced degenerative change through multiple IP joints, worse at the joints stated above in the exam.  Advanced STT arthrosis as well.    I have personally reviewed the above images and labs.         IMPRESSION AND RECOMMENDATIONS:  Ms. Tricia Sosa is a 83 year old female right hand dominant with bilateral generalized hand pain    While the radiographs appear consistent with osteoarthritis, given her polyarthralgia and simultaneous onset, I am suspicious that she has an inflammatory or autoimmune component.  Could have been exacerbated by COVID.    I therefore recommended a referral to rheumatology for further workup.  This referral has been placed.  I asked her to contact her PCP to see if there are any groups in the [facility] as her rheumatologist primarily are based out of [place].    Since the diclofenac has not been working, we will transition to Celebrex 100-200 mg twice daily.    All questions answered.  The patient voiced understanding and agreement.  Follow-up with me as needed.    Isidro Romeo MD    Hand, Upper Extremity & Microvascular Surgery  Department of Orthopaedic Surgery  [facility]          Again, thank you for allowing me to participate in the care of your  patient.        Sincerely,        Isidro Romeo MD

## 2024-10-24 ENCOUNTER — THERAPY VISIT (OUTPATIENT)
Dept: PHYSICAL THERAPY | Facility: CLINIC | Age: 84
End: 2024-10-24
Attending: STUDENT IN AN ORGANIZED HEALTH CARE EDUCATION/TRAINING PROGRAM
Payer: MEDICARE

## 2024-10-24 DIAGNOSIS — M25.562 CHRONIC PAIN OF LEFT KNEE: ICD-10-CM

## 2024-10-24 DIAGNOSIS — G89.29 CHRONIC PAIN OF LEFT KNEE: ICD-10-CM

## 2024-10-24 PROCEDURE — 97110 THERAPEUTIC EXERCISES: CPT | Mod: GP | Performed by: PHYSICAL THERAPIST

## 2024-10-24 PROCEDURE — 97161 PT EVAL LOW COMPLEX 20 MIN: CPT | Mod: GP | Performed by: PHYSICAL THERAPIST

## 2024-10-24 NOTE — PROGRESS NOTES
PHYSICAL THERAPY EVALUATION  Type of Visit: Evaluation       Fall Risk Screen:  Fall screen completed by: PT  Have you fallen 2 or more times in the past year?: No  Have you fallen and had an injury in the past year?: No  Is patient a fall risk?: No    Subjective       Presenting condition or subjective complaint: (Patient-Rptd) I was referred to strengthen my knees but my whole body is weak.  Plus I have pain in the back of my head and neck and extreme, constant pain in my fingers, hands, wrists.  Date of onset: 07/24/24    Relevant medical history: (Patient-Rptd) Anemia; Arthritis; Dizziness; Hearing problems; Osteoarthritis; Pain at night or rest; Progressive neurological deficits; Severe dizziness; Significant weakness; Sleep disorder like apnea; Thyroid problems; Vision problems   Dates & types of surgery: (Patient-Rptd) 1975 Laparoscopic tubal sterilization;  1984 Thyroid lobectomy;  1985 Hemorrhoidectomy;  1993 Laporoscopic cholecystectomy;  2004 Sinus;  2016 mass in thumb;  2021 Cataracts;  2024 Piggyback cataract surgery; '24 muscle biopsy;  '24 YAG laser CAP    Prior diagnostic imaging/testing results: (Patient-Rptd) Other (Patient-Rptd) I have had so many x-rays, CT scans, MRIs in my life and don't remember whether any were for this.  I've also had a number of EMGs which probably were for this and my balance issue.   Prior therapy history for the same diagnosis, illness or injury: (Patient-Rptd) Yes (Patient-Rptd) Many attempts at therapy.        Living Environment  Social support: (Patient-Rptd) With family members   Type of home: (Patient-Rptd) House   Stairs to enter the home: (Patient-Rptd) Yes (Patient-Rptd) 6 Is there a railing: (Patient-Rptd) Yes     Ramp: (Patient-Rptd) No   Stairs inside the home: (Patient-Rptd) Yes (Patient-Rptd) 14 Is there a railing: (Patient-Rptd) Yes     Help at home: (Patient-Rptd) Home management tasks (cooking, cleaning); Home and Yard maintenance tasks; Assist for  driving and community activities  Equipment owned: (Patient-Rptd) Straight Cane; Four-point cane; Walker; Walker with wheels; Grab bars; Raised toilet seat; Bath bench; Lift chair     Employment: (Patient-Rptd) No    Hobbies/Interests: (Patient-Rptd) gardening, sewing, keeping family records    Patient goals for therapy: (Patient-Rptd) Walk straight up. Rise from a chair without the aid of my hands. Go up and down steps.    Pain assessment: Pain present  Location: B knee /Rating: rest= Worst=     Objective   KNEE:      Gait: Using roller walker in home and in community    Functional:   - Sit to stand: from walker height (higher than chair).. needs hand to boost up            AROM: (* indicates patient's pain)   PROM:(over pressure)   L  R L R   Hyperextension         Extension   10* 10* 10* 10*   Flexion   115* 112* 115* 112*     Strength:   L R   KNEE     QS: R=good   L=good    SLR flexion R= 3+/5 L=4-/5    Palpation: warmth to touch both knees        Assessment & Plan   CLINICAL IMPRESSIONS  Medical Diagnosis: L knee pain; OA    Treatment Diagnosis: L knee OA   Impression/Assessment: Patient is a 83 year old female with B knee (L>R) complaints.  The following significant findings have been identified: Pain, Decreased ROM/flexibility, Decreased joint mobility, Decreased strength, Impaired balance, Decreased proprioception, Inflammation, Impaired gait, Impaired muscle performance, Decreased activity tolerance, Impaired posture, and Instability. These impairments interfere with their ability to perform self care tasks, work tasks, recreational activities, household chores, driving , household mobility, and community mobility as compared to previous level of function.     Clinical Decision Making (Complexity):  Clinical Presentation: Stable/Uncomplicated  Clinical Presentation Rationale: based on medical and personal factors listed in PT evaluation  Clinical Decision Making (Complexity): Low complexity    PLAN OF  CARE  Treatment Interventions:  Interventions: Manual Therapy, Neuromuscular Re-education, Therapeutic Activity, Therapeutic Exercise    Long Term Goals     PT Goal 1  Goal Identifier: Goal 1  Goal Description: Pt will be able to go up and down stairs, without a railing, painfree  Rationale: to maximize safety and independence within the home  Target Date: 01/16/25      Frequency of Treatment: 1X/week  Duration of Treatment: 12 weeks      Education Assessment:   Learner/Method: Patient;Pictures/Video    Risks and benefits of evaluation/treatment have been explained.   Patient/Family/caregiver agrees with Plan of Care.     Evaluation Time:     PT Eval, Low Complexity Minutes (11155): 15       Signing Clinician: Cece Landeros PT        [facility]                                                                                   OUTPATIENT PHYSICAL THERAPY      PLAN OF TREATMENT FOR OUTPATIENT REHABILITATION   Patient's Last Name, First Name, Tricia Jc YOB: 1940   Provider's Name   [facility]   Medical Record No.  [id number]     Onset Date: 07/24/24  Start of Care Date: 10/24/24     Medical Diagnosis:  L knee pain; OA      PT Treatment Diagnosis:  L knee OA Plan of Treatment  Frequency/Duration: 1X/week/ 12 weeks    Certification date from 10/24/24 to 01/16/25         See note for plan of treatment details and functional goals     Cece Landeros PT                         I CERTIFY THE NEED FOR THESE SERVICES FURNISHED UNDER        THIS PLAN OF TREATMENT AND WHILE UNDER MY CARE     (Physician attestation of this document indicates review and certification of the therapy plan).              Referring Provider:  Rashid Cardoso    Initial Assessment  See Epic Evaluation- Start of Care Date: 10/24/24

## 2024-10-25 ENCOUNTER — MYC MEDICAL ADVICE (OUTPATIENT)
Dept: ORTHOPEDICS | Facility: CLINIC | Age: 84
End: 2024-10-25
Payer: MEDICARE

## 2024-10-25 DIAGNOSIS — M19.049 HAND ARTHRITIS: Primary | ICD-10-CM

## 2024-10-25 DIAGNOSIS — M79.642 BILATERAL HAND PAIN: ICD-10-CM

## 2024-10-25 DIAGNOSIS — M79.641 BILATERAL HAND PAIN: ICD-10-CM

## 2024-10-25 NOTE — TELEPHONE ENCOUNTER
Hand therapy order pended. Routing to provider to advise and sign if appropriate.     Jaimie Kamara, ATC

## 2024-10-28 RX ORDER — ALBUTEROL SULFATE 0.83 MG/ML
2.5 SOLUTION RESPIRATORY (INHALATION)
Status: CANCELLED | OUTPATIENT
Start: 2024-10-28

## 2024-10-28 RX ORDER — EPINEPHRINE 1 MG/ML
0.3 INJECTION, SOLUTION INTRAMUSCULAR; SUBCUTANEOUS EVERY 5 MIN PRN
Status: CANCELLED | OUTPATIENT
Start: 2024-10-28

## 2024-10-28 RX ORDER — ALBUTEROL SULFATE 90 UG/1
1-2 INHALANT RESPIRATORY (INHALATION)
Status: CANCELLED
Start: 2024-10-28

## 2024-10-28 RX ORDER — DIPHENHYDRAMINE HYDROCHLORIDE 50 MG/ML
50 INJECTION INTRAMUSCULAR; INTRAVENOUS
Status: CANCELLED
Start: 2024-10-28

## 2024-10-28 RX ORDER — METHYLPREDNISOLONE SODIUM SUCCINATE 40 MG/ML
40 INJECTION INTRAMUSCULAR; INTRAVENOUS
Status: CANCELLED
Start: 2024-10-28

## 2024-10-28 RX ORDER — MEPERIDINE HYDROCHLORIDE 25 MG/ML
25 INJECTION INTRAMUSCULAR; INTRAVENOUS; SUBCUTANEOUS
Status: CANCELLED | OUTPATIENT
Start: 2024-10-28

## 2024-10-28 RX ORDER — DIPHENHYDRAMINE HYDROCHLORIDE 50 MG/ML
25 INJECTION INTRAMUSCULAR; INTRAVENOUS
Status: CANCELLED
Start: 2024-10-28

## 2024-11-01 ENCOUNTER — LAB (OUTPATIENT)
Dept: ONCOLOGY | Facility: CLINIC | Age: 84
End: 2024-11-01
Attending: PHYSICIAN ASSISTANT
Payer: MEDICARE

## 2024-11-01 DIAGNOSIS — D59.10 AUTOIMMUNE HEMOLYTIC ANEMIA (H): ICD-10-CM

## 2024-11-01 LAB
BASOPHILS # BLD AUTO: 0 10E3/UL (ref 0–0.2)
BASOPHILS NFR BLD AUTO: 0 %
EOSINOPHIL # BLD AUTO: 0 10E3/UL (ref 0–0.7)
EOSINOPHIL NFR BLD AUTO: 0 %
ERYTHROCYTE [DISTWIDTH] IN BLOOD BY AUTOMATED COUNT: 14.5 % (ref 10–15)
HCT VFR BLD AUTO: 32.9 % (ref 35–47)
HGB BLD-MCNC: 10.9 G/DL (ref 11.7–15.7)
IMM GRANULOCYTES # BLD: 0 10E3/UL
IMM GRANULOCYTES NFR BLD: 0 %
LYMPHOCYTES # BLD AUTO: 3.3 10E3/UL (ref 0.8–5.3)
LYMPHOCYTES NFR BLD AUTO: 47 %
MCH RBC QN AUTO: 32.9 PG (ref 26.5–33)
MCHC RBC AUTO-ENTMCNC: 33.1 G/DL (ref 31.5–36.5)
MCV RBC AUTO: 99 FL (ref 78–100)
MONOCYTES # BLD AUTO: 0.9 10E3/UL (ref 0–1.3)
MONOCYTES NFR BLD AUTO: 13 %
NEUTROPHILS # BLD AUTO: 2.7 10E3/UL (ref 1.6–8.3)
NEUTROPHILS NFR BLD AUTO: 40 %
NRBC # BLD AUTO: 0 10E3/UL
NRBC BLD AUTO-RTO: 0 /100
PLATELET # BLD AUTO: 228 10E3/UL (ref 150–450)
RBC # BLD AUTO: 3.31 10E6/UL (ref 3.8–5.2)
WBC # BLD AUTO: 6.9 10E3/UL (ref 4–11)

## 2024-11-01 PROCEDURE — 82784 ASSAY IGA/IGD/IGG/IGM EACH: CPT | Performed by: INTERNAL MEDICINE

## 2024-11-01 PROCEDURE — 85004 AUTOMATED DIFF WBC COUNT: CPT | Performed by: INTERNAL MEDICINE

## 2024-11-01 PROCEDURE — 36415 COLL VENOUS BLD VENIPUNCTURE: CPT

## 2024-11-01 NOTE — PROGRESS NOTES
Medical Assistant Note:  Tricia Sosa presents today for Blood draw.    Patient seen by provider today: No.   present during visit today: Not Applicable.    Concerns: No Concerns.    Procedure:  Lab draw site: right antecub, Needle type: butterfly, Gauge: 23 g.    Post Assessment:  Labs drawn without difficulty: No.    Discharge Plan:  Departure Mode: Ambulatory.    Face to Face Time: 10.    Katherin Graf [facility]

## 2024-11-04 ENCOUNTER — INFUSION THERAPY VISIT (OUTPATIENT)
Dept: INFUSION THERAPY | Facility: CLINIC | Age: 84
End: 2024-11-04
Attending: PHYSICIAN ASSISTANT
Payer: MEDICARE

## 2024-11-04 ENCOUNTER — ONCOLOGY VISIT (OUTPATIENT)
Dept: ONCOLOGY | Facility: CLINIC | Age: 84
End: 2024-11-04
Attending: PHYSICIAN ASSISTANT
Payer: MEDICARE

## 2024-11-04 ENCOUNTER — DOCUMENTATION ONLY (OUTPATIENT)
Dept: PHARMACY | Facility: CLINIC | Age: 84
End: 2024-11-04
Payer: MEDICARE

## 2024-11-04 VITALS
DIASTOLIC BLOOD PRESSURE: 58 MMHG | SYSTOLIC BLOOD PRESSURE: 121 MMHG | HEART RATE: 80 BPM | TEMPERATURE: 98.2 F | WEIGHT: 140.7 LBS | RESPIRATION RATE: 16 BRPM | BODY MASS INDEX: 20.19 KG/M2 | OXYGEN SATURATION: 97 %

## 2024-11-04 DIAGNOSIS — D59.10 AUTOIMMUNE HEMOLYTIC ANEMIA (H): ICD-10-CM

## 2024-11-04 DIAGNOSIS — D83.9 CVID (COMMON VARIABLE IMMUNODEFICIENCY) (H): ICD-10-CM

## 2024-11-04 DIAGNOSIS — D80.3 SELECTIVE DEFICIENCY OF IGG SUBCLASSES (H): ICD-10-CM

## 2024-11-04 DIAGNOSIS — D59.19 OTHER AUTOIMMUNE HEMOLYTIC ANEMIA (H): Primary | ICD-10-CM

## 2024-11-04 DIAGNOSIS — Q45.3 PANCREATIC ABNORMALITY: ICD-10-CM

## 2024-11-04 LAB — IGG SERPL-MCNC: 383 MG/DL (ref 610–1616)

## 2024-11-04 PROCEDURE — 96367 TX/PROPH/DG ADDL SEQ IV INF: CPT

## 2024-11-04 PROCEDURE — 250N000011 HC RX IP 250 OP 636: Performed by: INTERNAL MEDICINE

## 2024-11-04 PROCEDURE — 250N000011 HC RX IP 250 OP 636: Mod: JZ | Performed by: INTERNAL MEDICINE

## 2024-11-04 PROCEDURE — 258N000003 HC RX IP 258 OP 636: Performed by: INTERNAL MEDICINE

## 2024-11-04 PROCEDURE — 96366 THER/PROPH/DIAG IV INF ADDON: CPT

## 2024-11-04 PROCEDURE — G2211 COMPLEX E/M VISIT ADD ON: HCPCS | Performed by: INTERNAL MEDICINE

## 2024-11-04 PROCEDURE — 96413 CHEMO IV INFUSION 1 HR: CPT

## 2024-11-04 PROCEDURE — 99214 OFFICE O/P EST MOD 30 MIN: CPT | Performed by: INTERNAL MEDICINE

## 2024-11-04 PROCEDURE — 96415 CHEMO IV INFUSION ADDL HR: CPT

## 2024-11-04 PROCEDURE — 250N000013 HC RX MED GY IP 250 OP 250 PS 637: Performed by: INTERNAL MEDICINE

## 2024-11-04 PROCEDURE — G0463 HOSPITAL OUTPT CLINIC VISIT: HCPCS | Performed by: INTERNAL MEDICINE

## 2024-11-04 PROCEDURE — 96375 TX/PRO/DX INJ NEW DRUG ADDON: CPT

## 2024-11-04 RX ORDER — DIPHENHYDRAMINE HCL 25 MG
50 CAPSULE ORAL ONCE
Status: COMPLETED | OUTPATIENT
Start: 2024-11-04 | End: 2024-11-04

## 2024-11-04 RX ORDER — HEPARIN SODIUM (PORCINE) LOCK FLUSH IV SOLN 100 UNIT/ML 100 UNIT/ML
5 SOLUTION INTRAVENOUS
OUTPATIENT
Start: 2024-11-05

## 2024-11-04 RX ORDER — DIPHENHYDRAMINE HYDROCHLORIDE 50 MG/ML
25 INJECTION INTRAMUSCULAR; INTRAVENOUS
Status: CANCELLED
Start: 2024-11-04

## 2024-11-04 RX ORDER — EPINEPHRINE 1 MG/ML
0.3 INJECTION, SOLUTION INTRAMUSCULAR; SUBCUTANEOUS EVERY 5 MIN PRN
Status: CANCELLED | OUTPATIENT
Start: 2024-11-04

## 2024-11-04 RX ORDER — MEPERIDINE HYDROCHLORIDE 25 MG/ML
25 INJECTION INTRAMUSCULAR; INTRAVENOUS; SUBCUTANEOUS
Status: CANCELLED | OUTPATIENT
Start: 2024-11-04

## 2024-11-04 RX ORDER — ALBUTEROL SULFATE 0.83 MG/ML
2.5 SOLUTION RESPIRATORY (INHALATION)
OUTPATIENT
Start: 2024-11-05

## 2024-11-04 RX ORDER — DIPHENHYDRAMINE HYDROCHLORIDE 50 MG/ML
25 INJECTION INTRAMUSCULAR; INTRAVENOUS
Start: 2024-11-05

## 2024-11-04 RX ORDER — ALBUTEROL SULFATE 90 UG/1
1-2 INHALANT RESPIRATORY (INHALATION)
Status: CANCELLED
Start: 2024-11-04

## 2024-11-04 RX ORDER — DIPHENHYDRAMINE HYDROCHLORIDE 50 MG/ML
50 INJECTION INTRAMUSCULAR; INTRAVENOUS
Status: CANCELLED
Start: 2024-11-04

## 2024-11-04 RX ORDER — METHYLPREDNISOLONE SODIUM SUCCINATE 40 MG/ML
20 INJECTION INTRAMUSCULAR; INTRAVENOUS ONCE
Status: COMPLETED | OUTPATIENT
Start: 2024-11-04 | End: 2024-11-04

## 2024-11-04 RX ORDER — METHYLPREDNISOLONE SODIUM SUCCINATE 40 MG/ML
20 INJECTION INTRAMUSCULAR; INTRAVENOUS ONCE
Start: 2024-11-05 | End: 2024-11-05

## 2024-11-04 RX ORDER — ALBUTEROL SULFATE 90 UG/1
1-2 INHALANT RESPIRATORY (INHALATION)
Start: 2024-11-05

## 2024-11-04 RX ORDER — EPINEPHRINE 1 MG/ML
0.3 INJECTION, SOLUTION INTRAMUSCULAR; SUBCUTANEOUS EVERY 5 MIN PRN
OUTPATIENT
Start: 2024-11-05

## 2024-11-04 RX ORDER — DIPHENHYDRAMINE HYDROCHLORIDE 50 MG/ML
50 INJECTION INTRAMUSCULAR; INTRAVENOUS
Start: 2024-11-05

## 2024-11-04 RX ORDER — METHYLPREDNISOLONE SODIUM SUCCINATE 40 MG/ML
40 INJECTION INTRAMUSCULAR; INTRAVENOUS
Start: 2024-11-05

## 2024-11-04 RX ORDER — ALBUTEROL SULFATE 0.83 MG/ML
2.5 SOLUTION RESPIRATORY (INHALATION)
Status: CANCELLED | OUTPATIENT
Start: 2024-11-04

## 2024-11-04 RX ORDER — DIPHENHYDRAMINE HCL 25 MG
50 CAPSULE ORAL ONCE
Status: CANCELLED | OUTPATIENT
Start: 2024-11-04

## 2024-11-04 RX ORDER — ACETAMINOPHEN 325 MG/1
650 TABLET ORAL ONCE
Status: COMPLETED | OUTPATIENT
Start: 2024-11-04 | End: 2024-11-04

## 2024-11-04 RX ORDER — HEPARIN SODIUM,PORCINE 10 UNIT/ML
5 VIAL (ML) INTRAVENOUS
Status: CANCELLED | OUTPATIENT
Start: 2024-11-04

## 2024-11-04 RX ORDER — MEPERIDINE HYDROCHLORIDE 25 MG/ML
25 INJECTION INTRAMUSCULAR; INTRAVENOUS; SUBCUTANEOUS
OUTPATIENT
Start: 2024-11-05

## 2024-11-04 RX ORDER — HEPARIN SODIUM,PORCINE 10 UNIT/ML
5 VIAL (ML) INTRAVENOUS
OUTPATIENT
Start: 2024-11-05

## 2024-11-04 RX ORDER — MEPERIDINE HYDROCHLORIDE 25 MG/ML
25 INJECTION INTRAMUSCULAR; INTRAVENOUS; SUBCUTANEOUS
Status: CANCELLED
Start: 2024-11-04

## 2024-11-04 RX ORDER — HEPARIN SODIUM (PORCINE) LOCK FLUSH IV SOLN 100 UNIT/ML 100 UNIT/ML
5 SOLUTION INTRAVENOUS
Status: CANCELLED | OUTPATIENT
Start: 2024-11-04

## 2024-11-04 RX ORDER — METHYLPREDNISOLONE SODIUM SUCCINATE 125 MG/2ML
125 INJECTION INTRAMUSCULAR; INTRAVENOUS
Status: CANCELLED
Start: 2024-11-04

## 2024-11-04 RX ORDER — ACETAMINOPHEN 325 MG/1
650 TABLET ORAL ONCE
Status: CANCELLED | OUTPATIENT
Start: 2024-11-04

## 2024-11-04 RX ORDER — METHYLPREDNISOLONE SODIUM SUCCINATE 40 MG/ML
40 INJECTION INTRAMUSCULAR; INTRAVENOUS
Status: CANCELLED
Start: 2024-11-04

## 2024-11-04 RX ADMIN — RITUXIMAB-ABBS 700 MG: 10 INJECTION, SOLUTION INTRAVENOUS at 12:56

## 2024-11-04 RX ADMIN — ACETAMINOPHEN 650 MG: 325 TABLET ORAL at 12:19

## 2024-11-04 RX ADMIN — DIPHENHYDRAMINE HYDROCHLORIDE 25 MG: 25 CAPSULE ORAL at 12:19

## 2024-11-04 RX ADMIN — METHYLPREDNISOLONE SODIUM SUCCINATE 20 MG: 40 INJECTION, POWDER, FOR SOLUTION INTRAMUSCULAR; INTRAVENOUS at 10:27

## 2024-11-04 RX ADMIN — HUMAN IMMUNOGLOBULIN G 35 G: 20 LIQUID INTRAVENOUS at 10:29

## 2024-11-04 ASSESSMENT — ENCOUNTER SYMPTOMS
GASTROINTESTINAL NEGATIVE: 1
UNEXPECTED WEIGHT CHANGE: 1
ENDOCRINE NEGATIVE: 1
RESPIRATORY NEGATIVE: 1
FATIGUE: 1
SLEEP DISTURBANCE: 1
HEMATOLOGIC/LYMPHATIC NEGATIVE: 1
EXTREMITY WEAKNESS: 1
EYES NEGATIVE: 1
CARDIOVASCULAR NEGATIVE: 1

## 2024-11-04 ASSESSMENT — PAIN SCALES - GENERAL: PAINLEVEL_OUTOF10: SEVERE PAIN (6)

## 2024-11-04 NOTE — NURSING NOTE
"Oncology Rooming Note    November 4, 2024 9:37 AM   Tricia Sosa is a 84 year old female who presents for:    Chief Complaint   Patient presents with    Oncology Clinic Visit     Other autoimmune hemolytic anemia (H)        Initial Vitals: /58   Pulse 80   Temp 98.2  F (36.8  C) (Oral)   Resp 16   Wt 63.8 kg (140 lb 11.2 oz)   LMP  (LMP Unknown)   SpO2 97%   BMI 20.19 kg/m   Estimated body mass index is 20.19 kg/m  as calculated from the following:    Height as of 10/22/24: 1.778 m (5' 10\").    Weight as of this encounter: 63.8 kg (140 lb 11.2 oz). Body surface area is 1.78 meters squared.  Severe Pain (6) Comment: Data Unavailable   No LMP recorded (lmp unknown). Patient is postmenopausal.  Allergies reviewed: Yes  Medications reviewed: Yes    Medications: Medication refills not needed today.  Pharmacy name entered into [email or web address]:    [place], [place] - [address] [facility] [place] [address] [address] [facility]    Frailty Screening:   Is the patient here for a new oncology consult visit in cancer care? 2. No      Clinical concerns: f/u       Katherin Graf CMA                "

## 2024-11-04 NOTE — PROGRESS NOTES
Infusion Nursing Note:  Tricia Sosa presents today for IVIG and Rituximab.    Patient seen by provider today: Yes: Dr. Ramos   present during visit today: Not Applicable.    Note: Pre-medicated with Solu-medrol.  IVIG infusion initiated at 0.5 ml/kg/hr and increased by 0.5 ml/kg/hr every 15 minutes to max rate of 4 ml/kg/hr.    Rituximab infused at rapid rate. Pre-medicated with Tylenol and Benadryl.     Intravenous Access:  Peripheral IV placed.    Treatment Conditions:  She denies any new symptoms, reactions, fever/elevated temperature, illness, recent major or local infection, being on antibiotics, productive cough, or recent surgery.   11/01/24 13:35    (L)       Post Infusion Assessment:  Patient tolerated infusion without incident.  Blood return noted pre and post infusion.  Site patent and intact, free from redness, edema or discomfort.  No evidence of extravasations.  Access discontinued per protocol.       Discharge Plan:   Discharge instructions reviewed with: Patient.  Patient and/or family verbalized understanding of discharge instructions and all questions answered.  AVS to patient via [employer].  Patient will return 12/26/24 for next appointment.   Patient discharged in stable condition accompanied by: self.  Departure Mode: Ambulatory.      Tricia Storey RN

## 2024-11-04 NOTE — LETTER
2024      Tricia Sosa  [id number]  [facility] [address]      Dear Colleague,    Thank you for referring your patient, Tricia Sosa, to the [facility] [place]. Please see a copy of my visit note below.    Assessment & Plan  CVID  AIHA    IV IgG and Rituximab today  Next provider visit in 3 months but we might need to schedule infusions more densely depending on rheumatology consult    Interval History  This is a scheduled follow up visit for this lady who previously has been seen by the [facility] () and more recently by Domi Matos, Melina, and Aleksey for CVID, IgA deficiency and AIHA.  She's currently getting monthly rituximab (which controls her AIHA) and every two months IV IgG.  Her last hemolytic episode was .    She has really had a rough time in the last few months (she had an episode of COVID in August), with much worse arthritis in her hands, knees, and hips.  She's seen ortho and had injections in the knees and trials of NSAIDS.  She will be seeing a rheumatologist on , but she (and I) suspect she's got rheumatoid arthritis.     We discussed the possibility that her rheumatologist might want us to increase the frequency of her rituximab infusions to monthly.       She has had some cystic pancreatic changes on scanning that concern her because her brother  from pancreas cancer.  GI has dismissed her although she continues to have cystic lesions in her pancreas.    ECOG Peformance Status  1 - Restricted in physically strenuous activity, but ambulatory and able to carry out work of a light and sedentary nature    Patient Active Problem List   Diagnosis     Lymphocytic colitis     Acquired hypothyroidism     CVID (common variable immunodeficiency) (H)     B12 deficiency     CARDIOVASCULAR SCREENING; LDL GOAL LESS THAN 130     Other autoimmune hemolytic anemia (H)     Right-sided low back pain with right-sided sciatica, unspecified  chronicity     Autoimmune hemolytic anemia (H)     Bronchiectasis (H)     Mild obstructive sleep apnea     Selective deficiency of IgG subclasses (H)     Anemia, iron deficiency     Mild protein-calorie malnutrition (H)     Ataxia, unspecified     DDD (degenerative disc disease), lumbar     Diarrhea     Other acquired deformities of left foot     Other acquired deformities of right foot     Other amnesia     Other disturbances of skin sensation     Current Outpatient Medications   Medication Sig Dispense Refill     celecoxib (CELEBREX) 100 MG capsule Take 1-2 capsules (100-200 mg) by mouth 2 times daily. 90 capsule 1     cyanocobalamin (VITAMIN  B-12) 1000 MCG tablet   3     folic acid (FOLVITE) 1 MG tablet   3     hydroCHLOROthiazide 12.5 MG tablet TAKE 1 TABLET AS NEEDED FOR EDEMA 30 tablet 2     ketoconazole (NIZORAL) 2 % external shampoo Use every other day to scalp as needed 120 mL 0     levothyroxine (SYNTHROID/LEVOTHROID) 150 MCG tablet Take 1 tablet (150 mcg) by mouth daily 90 tablet 3     omega 3 1000 MG CAPS Take 1,280 mg by mouth daily       triamcinolone (KENALOG) 0.1 % external ointment Apply topically 2 times daily       Current Facility-Administered Medications   Medication Dose Route Frequency Provider Last Rate Last Admin     lidocaine 1 % injection 7 mL  7 mL      7 mL at 09/23/24 1426     lidocaine 1 % injection 7 mL  7 mL      7 mL at 09/23/24 1426     triamcinolone (KENALOG-40) injection 40 mg  40 mg      40 mg at 09/23/24 1426     triamcinolone (KENALOG-40) injection 40 mg  40 mg      40 mg at 09/23/24 1426     Past Medical History:   Diagnosis Date     Arthritis      WARD (dyspnea on exertion)      External hemorrhoids without mention of complication 06/27/2005     Hemolytic anemia (H)     autoimmune     Hypothyroidism      IgA deficiency (H)      Myopia of both eyes with astigmatism and presbyopia 08/16/2017     Other malaise and fatigue      Other severe protein-calorie malnutrition       Other vitreous opacities 12/19/2006     Sleep apnea      Thyroid disease      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    Hemorrhage Subdural Trauma Without LOC Active     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, and posterior vitreous detachment     Urinary tract infection, site not specified     Recurrent UTI's     Past Surgical History:   Procedure Laterality Date     BIOPSY MUSCLE DIAGNOSTIC (LOCATION) Right 1/16/2024    Procedure: BIOPSY, MUSCLE - RIGHT THIGH;  Surgeon: Dickson Madrid MD;  Location: [facility] OR     COLONOSCOPY N/A 4/26/2016    Procedure: COLONOSCOPY;  Surgeon: Dontae Del Rio MD;  Location:  GI     COLONOSCOPY N/A 2/22/2024    Procedure: Colonoscopy with polypectomy by using cold snare and two hemoclips applied;  Surgeon: Dontae Del Rio MD;  Location:  GI     ENT SURGERY  1985    Thyroid lobectomy     ESOPHAGOSCOPY, GASTROSCOPY, DUODENOSCOPY (EGD), COMBINED N/A 2/22/2024    Procedure: ESOPHAGOGASTRODUODENOSCOPY, WITH BIOPSY by using cold forcep;  Surgeon: Dontae Del Rio MD;  Location:  GI     EYE SURGERY Bilateral 04/20/2021    Cataract Surgery     HC CYSTOSCOPY,INSERT URETERAL STENT      Ureteral stent insertion     HC FLEX SIGMOIDOSCOPY W BIOPSY  5/30/00     HC LAPAROSCOPY, SURGICAL; CHOLECYSTECTOMY  1992      THYROIDECTOMY       LIGATION OF HEMORRHOID(S)      Hemorrhoidectomy     UPPER GI ENDOSCOPY,BIOPSY  10/01/01     ZZC LIGATE FALLOPIAN TUBE       ZZHC COLONOSCOPY W BIOPSY  4/26/00     ZZHC COLONOSCOPY W BIOPSY  10/8/03     ZZHC COLONOSCOPY W BIOPSY  6/27/05    REPEAT IN 5 YEARS     Socioeconomic History     Marital status:      Social Drivers of Health     Interpersonal Safety: Low Risk  (10/24/2024)    Interpersonal Safety      Do you feel physically and emotionally safe where you currently live?: Yes      Within the past 12 months, have you been hit, slapped, kicked or otherwise physically hurt by someone?: No       Within the past 12 months, have you been humiliated or emotionally abused in other ways by your partner or ex-partner?: No     Review of Systems   Constitutional:  Positive for fatigue and unexpected weight change.   HENT:  Negative.     Eyes: Negative.    Respiratory: Negative.     Cardiovascular: Negative.    Gastrointestinal: Negative.    Endocrine: Negative.    Genitourinary:  Positive for nocturia.    Musculoskeletal:  Positive for gait problem.   Neurological:  Positive for extremity weakness and gait problem.   Hematological: Negative.    Psychiatric/Behavioral:  Positive for sleep disturbance (due to pain).    All other systems reviewed and are negative.      /58   Pulse 80   Temp 98.2  F (36.8  C) (Oral)   Resp 16   Wt 63.8 kg (140 lb 11.2 oz)   LMP  (LMP Unknown)   SpO2 97%   BMI 20.19 kg/m      Physical Exam  Vitals and nursing note reviewed.   Constitutional:       Appearance: Normal appearance. She is well-groomed.      Comments: Tall slender calm    HENT:      Head: Normocephalic and atraumatic.      Mouth/Throat:      Mouth: Mucous membranes are moist.      Dentition: Abnormal dentition (some missing molars).   Eyes:      Extraocular Movements: Extraocular movements intact.      Conjunctiva/sclera: Conjunctivae normal.      Pupils: Pupils are equal, round, and reactive to light.   Cardiovascular:      Rate and Rhythm: Normal rate and regular rhythm.      Pulses: Normal pulses.      Heart sounds: Normal heart sounds.   Pulmonary:      Effort: Pulmonary effort is normal.      Breath sounds: Normal breath sounds.   Abdominal:      General: There is no distension.      Palpations: Abdomen is soft. There is no mass.      Tenderness: There is no abdominal tenderness. There is no guarding.   Musculoskeletal:         General: Tenderness present.      Cervical back: Normal range of motion and neck supple.      Thoracic back: Deformity (scoliosis) present.      Right lower leg: No edema.      Left  lower leg: No edema.      Comments: Right 4th and left 3rd PIP joints with swelling and tenderness   Lymphadenopathy:      Cervical: No cervical adenopathy.   Skin:     General: Skin is warm and dry.      Findings: No rash.   Neurological:      General: No focal deficit present.      Mental Status: She is alert and oriented to person, place, and time.      Cranial Nerves: No cranial nerve deficit.      Motor: Weakness present.      Gait: Gait abnormal.   Psychiatric:         Mood and Affect: Mood normal.         Behavior: Behavior normal. Behavior is cooperative.         Thought Content: Thought content normal.         Judgment: Judgment normal.         Recent Results (from the past 720 hours)   IgG    Collection Time: 11/01/24  1:35 PM   Result Value Ref Range    Immunoglobulin G 383 (L) 610 - 1,616 mg/dL   CBC with platelets and differential    Collection Time: 11/01/24  1:35 PM   Result Value Ref Range    WBC Count 6.9 4.0 - 11.0 10e3/uL    RBC Count 3.31 (L) 3.80 - 5.20 10e6/uL    Hemoglobin 10.9 (L) 11.7 - 15.7 g/dL    Hematocrit 32.9 (L) 35.0 - 47.0 %    MCV 99 78 - 100 fL    MCH 32.9 26.5 - 33.0 pg    MCHC 33.1 31.5 - 36.5 g/dL    RDW 14.5 10.0 - 15.0 %    Platelet Count 228 150 - 450 10e3/uL    % Neutrophils 40 %    % Lymphocytes 47 %    % Monocytes 13 %    % Eosinophils 0 %    % Basophils 0 %    % Immature Granulocytes 0 %    NRBCs per 100 WBC 0 <1 /100    Absolute Neutrophils 2.7 1.6 - 8.3 10e3/uL    Absolute Lymphocytes 3.3 0.8 - 5.3 10e3/uL    Absolute Monocytes 0.9 0.0 - 1.3 10e3/uL    Absolute Eosinophils 0.0 0.0 - 0.7 10e3/uL    Absolute Basophils 0.0 0.0 - 0.2 10e3/uL    Absolute Immature Granulocytes 0.0 <=0.4 10e3/uL    Absolute NRBCs 0.0 10e3/uL     Recent Results (from the past 744 hours)   XR Hand Bilateral G/E 3 Views    Narrative    HAND THREE VIEWS BILATERAL  10/22/2024 11:19 AM     HISTORY: Bilateral hand pain  COMPARISON: None.      Impression    IMPRESSION: No acute fracture or  malalignment. Severe degenerative  changes of the bilateral STT and multifocal IP joints. There is  superimposed erosive change and soft tissue swelling at the right  fifth DIP and left third MCP joints. Osteopenia.    DOUGLAS PIERCE MD         SYSTEM ID:  [id number]   Again, thank you for allowing me to participate in the care of your patient.        Sincerely,        Fely Ramos MD

## 2024-11-04 NOTE — PROGRESS NOTES
Pharmacist IVIG Stewardship Program    Diagnosis: Common Variable Immunodeficiency  Patient was originally diagnosed with [facility] in the 2000's and later transferred her care to [facility]   Date 5/27/2015   IgA Level  Undetectable   IgG Total 658   IgG2 106   IgG4 0.5   IgM Level 48     Current Dosing Regimen: Privigen 35g IV every 8 weeks if IgG <600mg/dL.    Titrated down from every 3 week infusions to every 4 week infusions in 2017  Titrated down further from every 4 weeks to every 6 weeks infusions in March 2018, but patient declined drastically and had to be escalated back to monthly infusions in September 2018  On IVIG every 8 weeks if IgG <600 per provider appointment in February 2024  Pre-medications:  Methylprednisolone 20 mg IV push prior to infusion  Current Titration Regimen: 0.5ml/kg/hr and increased by 0.5ml/kg/hr every 15 minutes, max rate 4ml/kg/hr.  Previously Tried Products: Gammagard SD, currently on Privigen    Side Effects: Patient denies side effects such as headaches, flushing, fever, muscle or joint pain, nausea, or rash/itching.    Interventions: Lab review completed.     Assessment:   Date  11/1/2024   IgG 383   Patient is appropriate for additional IVIG therapy when their level is within range. She is tolerating infusions well and IVIG infusions are effective in increasing Ig levels to an appropriate level to minimize patients risk of infection. Patient should continue on treatment as she has been per provider orders, without therapy her levels would drop below therapeutic range.    Plan: Patient is okay to proceed with IVIG infusion on 11/4/24.     Next Review Needed: 1/2025    Lynsey Narvaez, PharmD, IgCP  Medication Access Pharmacist

## 2024-11-04 NOTE — PROGRESS NOTES
Assessment & Plan   CVID  AIHA    IV IgG and Rituximab today  Next provider visit in 3 months but we might need to schedule infusions more densely depending on rheumatology consult    Interval History  This is a scheduled follow up visit for this lady who previously has been seen by the [facility] () and more recently by Domi Matos, Melina, and Aleksey for CVID, IgA deficiency and AIHA.  She's currently getting monthly rituximab (which controls her AIHA) and every two months IV IgG.  Her last hemolytic episode was .    She has really had a rough time in the last few months (she had an episode of COVID in August), with much worse arthritis in her hands, knees, and hips.  She's seen ortho and had injections in the knees and trials of NSAIDS.  She will be seeing a rheumatologist on , but she (and I) suspect she's got rheumatoid arthritis.     We discussed the possibility that her rheumatologist might want us to increase the frequency of her rituximab infusions to monthly.       She has had some cystic pancreatic changes on scanning that concern her because her brother  from pancreas cancer.  GI has dismissed her although she continues to have cystic lesions in her pancreas.    ECOG Peformance Status  1 - Restricted in physically strenuous activity, but ambulatory and able to carry out work of a light and sedentary nature    Patient Active Problem List   Diagnosis    Lymphocytic colitis    Acquired hypothyroidism    CVID (common variable immunodeficiency) (H)    B12 deficiency    CARDIOVASCULAR SCREENING; LDL GOAL LESS THAN 130    Other autoimmune hemolytic anemia (H)    Right-sided low back pain with right-sided sciatica, unspecified chronicity    Autoimmune hemolytic anemia (H)    Bronchiectasis (H)    Mild obstructive sleep apnea    Selective deficiency of IgG subclasses (H)    Anemia, iron deficiency    Mild protein-calorie malnutrition (H)    Ataxia, unspecified    DDD (degenerative disc  disease), lumbar    Diarrhea    Other acquired deformities of left foot    Other acquired deformities of right foot    Other amnesia    Other disturbances of skin sensation     Current Outpatient Medications   Medication Sig Dispense Refill    celecoxib (CELEBREX) 100 MG capsule Take 1-2 capsules (100-200 mg) by mouth 2 times daily. 90 capsule 1    cyanocobalamin (VITAMIN  B-12) 1000 MCG tablet   3    folic acid (FOLVITE) 1 MG tablet   3    hydroCHLOROthiazide 12.5 MG tablet TAKE 1 TABLET AS NEEDED FOR EDEMA 30 tablet 2    ketoconazole (NIZORAL) 2 % external shampoo Use every other day to scalp as needed 120 mL 0    levothyroxine (SYNTHROID/LEVOTHROID) 150 MCG tablet Take 1 tablet (150 mcg) by mouth daily 90 tablet 3    omega 3 1000 MG CAPS Take 1,280 mg by mouth daily      triamcinolone (KENALOG) 0.1 % external ointment Apply topically 2 times daily       Current Facility-Administered Medications   Medication Dose Route Frequency Provider Last Rate Last Admin    lidocaine 1 % injection 7 mL  7 mL      7 mL at 09/23/24 1426    lidocaine 1 % injection 7 mL  7 mL      7 mL at 09/23/24 1426    triamcinolone (KENALOG-40) injection 40 mg  40 mg      40 mg at 09/23/24 1426    triamcinolone (KENALOG-40) injection 40 mg  40 mg      40 mg at 09/23/24 1426     Past Medical History:   Diagnosis Date    Arthritis     WARD (dyspnea on exertion)     External hemorrhoids without mention of complication 06/27/2005    Hemolytic anemia (H)     autoimmune    Hypothyroidism     IgA deficiency (H)     Myopia of both eyes with astigmatism and presbyopia 08/16/2017    Other malaise and fatigue     Other severe protein-calorie malnutrition     Other vitreous opacities 12/19/2006    Sleep apnea     Thyroid disease     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    Hemorrhage Subdural Trauma Without LOC Active    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, and posterior vitreous detachment    Urinary  tract infection, site not specified     Recurrent UTI's     Past Surgical History:   Procedure Laterality Date    BIOPSY MUSCLE DIAGNOSTIC (LOCATION) Right 1/16/2024    Procedure: BIOPSY, MUSCLE - RIGHT THIGH;  Surgeon: Dickson Madrid MD;  Location: [facility] OR    COLONOSCOPY N/A 4/26/2016    Procedure: COLONOSCOPY;  Surgeon: Dontae Del Rio MD;  Location:  GI    COLONOSCOPY N/A 2/22/2024    Procedure: Colonoscopy with polypectomy by using cold snare and two hemoclips applied;  Surgeon: Dontae Del Rio MD;  Location:  GI    ENT SURGERY  1985    Thyroid lobectomy    ESOPHAGOSCOPY, GASTROSCOPY, DUODENOSCOPY (EGD), COMBINED N/A 2/22/2024    Procedure: ESOPHAGOGASTRODUODENOSCOPY, WITH BIOPSY by using cold forcep;  Surgeon: Dontae Del Rio MD;  Location:  GI    EYE SURGERY Bilateral 04/20/2021    Cataract Surgery    HC CYSTOSCOPY,INSERT URETERAL STENT      Ureteral stent insertion    HC FLEX SIGMOIDOSCOPY W BIOPSY  5/30/00    HC LAPAROSCOPY, SURGICAL; CHOLECYSTECTOMY  1992     THYROIDECTOMY      LIGATION OF HEMORRHOID(S)      Hemorrhoidectomy    UPPER GI ENDOSCOPY,BIOPSY  10/01/01    ZZC LIGATE FALLOPIAN TUBE      ZZHC COLONOSCOPY W BIOPSY  4/26/00    ZZHC COLONOSCOPY W BIOPSY  10/8/03    ZZHC COLONOSCOPY W BIOPSY  6/27/05    REPEAT IN 5 YEARS     Socioeconomic History    Marital status:      Social Drivers of Health     Interpersonal Safety: Low Risk  (10/24/2024)    Interpersonal Safety     Do you feel physically and emotionally safe where you currently live?: Yes     Within the past 12 months, have you been hit, slapped, kicked or otherwise physically hurt by someone?: No     Within the past 12 months, have you been humiliated or emotionally abused in other ways by your partner or ex-partner?: No     Review of Systems   Constitutional:  Positive for fatigue and unexpected weight change.   HENT:  Negative.     Eyes: Negative.    Respiratory: Negative.     Cardiovascular: Negative.     Gastrointestinal: Negative.    Endocrine: Negative.    Genitourinary:  Positive for nocturia.    Musculoskeletal:  Positive for gait problem.   Neurological:  Positive for extremity weakness and gait problem.   Hematological: Negative.    Psychiatric/Behavioral:  Positive for sleep disturbance (due to pain).    All other systems reviewed and are negative.      /58   Pulse 80   Temp 98.2  F (36.8  C) (Oral)   Resp 16   Wt 63.8 kg (140 lb 11.2 oz)   LMP  (LMP Unknown)   SpO2 97%   BMI 20.19 kg/m      Physical Exam  Vitals and nursing note reviewed.   Constitutional:       Appearance: Normal appearance. She is well-groomed.      Comments: Tall slender calm    HENT:      Head: Normocephalic and atraumatic.      Mouth/Throat:      Mouth: Mucous membranes are moist.      Dentition: Abnormal dentition (some missing molars).   Eyes:      Extraocular Movements: Extraocular movements intact.      Conjunctiva/sclera: Conjunctivae normal.      Pupils: Pupils are equal, round, and reactive to light.   Cardiovascular:      Rate and Rhythm: Normal rate and regular rhythm.      Pulses: Normal pulses.      Heart sounds: Normal heart sounds.   Pulmonary:      Effort: Pulmonary effort is normal.      Breath sounds: Normal breath sounds.   Abdominal:      General: There is no distension.      Palpations: Abdomen is soft. There is no mass.      Tenderness: There is no abdominal tenderness. There is no guarding.   Musculoskeletal:         General: Tenderness present.      Cervical back: Normal range of motion and neck supple.      Thoracic back: Deformity (scoliosis) present.      Right lower leg: No edema.      Left lower leg: No edema.      Comments: Right 4th and left 3rd PIP joints with swelling and tenderness   Lymphadenopathy:      Cervical: No cervical adenopathy.   Skin:     General: Skin is warm and dry.      Findings: No rash.   Neurological:      General: No focal deficit present.      Mental Status: She is  alert and oriented to person, place, and time.      Cranial Nerves: No cranial nerve deficit.      Motor: Weakness present.      Gait: Gait abnormal.   Psychiatric:         Mood and Affect: Mood normal.         Behavior: Behavior normal. Behavior is cooperative.         Thought Content: Thought content normal.         Judgment: Judgment normal.         Recent Results (from the past 720 hours)   IgG    Collection Time: 11/01/24  1:35 PM   Result Value Ref Range    Immunoglobulin G 383 (L) 610 - 1,616 mg/dL   CBC with platelets and differential    Collection Time: 11/01/24  1:35 PM   Result Value Ref Range    WBC Count 6.9 4.0 - 11.0 10e3/uL    RBC Count 3.31 (L) 3.80 - 5.20 10e6/uL    Hemoglobin 10.9 (L) 11.7 - 15.7 g/dL    Hematocrit 32.9 (L) 35.0 - 47.0 %    MCV 99 78 - 100 fL    MCH 32.9 26.5 - 33.0 pg    MCHC 33.1 31.5 - 36.5 g/dL    RDW 14.5 10.0 - 15.0 %    Platelet Count 228 150 - 450 10e3/uL    % Neutrophils 40 %    % Lymphocytes 47 %    % Monocytes 13 %    % Eosinophils 0 %    % Basophils 0 %    % Immature Granulocytes 0 %    NRBCs per 100 WBC 0 <1 /100    Absolute Neutrophils 2.7 1.6 - 8.3 10e3/uL    Absolute Lymphocytes 3.3 0.8 - 5.3 10e3/uL    Absolute Monocytes 0.9 0.0 - 1.3 10e3/uL    Absolute Eosinophils 0.0 0.0 - 0.7 10e3/uL    Absolute Basophils 0.0 0.0 - 0.2 10e3/uL    Absolute Immature Granulocytes 0.0 <=0.4 10e3/uL    Absolute NRBCs 0.0 10e3/uL     Recent Results (from the past 744 hours)   XR Hand Bilateral G/E 3 Views    Narrative    HAND THREE VIEWS BILATERAL  10/22/2024 11:19 AM     HISTORY: Bilateral hand pain  COMPARISON: None.      Impression    IMPRESSION: No acute fracture or malalignment. Severe degenerative  changes of the bilateral STT and multifocal IP joints. There is  superimposed erosive change and soft tissue swelling at the right  fifth DIP and left third MCP joints. Osteopenia.    DOUGLAS PIERCE MD         SYSTEM ID:  [id number]

## 2024-11-05 ENCOUNTER — OFFICE VISIT (OUTPATIENT)
Dept: ORTHOPEDICS | Facility: CLINIC | Age: 84
End: 2024-11-05
Payer: MEDICARE

## 2024-11-05 VITALS
DIASTOLIC BLOOD PRESSURE: 78 MMHG | HEIGHT: 70 IN | BODY MASS INDEX: 20.04 KG/M2 | SYSTOLIC BLOOD PRESSURE: 160 MMHG | WEIGHT: 140 LBS

## 2024-11-05 DIAGNOSIS — M25.551 ACUTE RIGHT HIP PAIN: Primary | ICD-10-CM

## 2024-11-05 DIAGNOSIS — M16.11 PRIMARY OSTEOARTHRITIS OF RIGHT HIP: ICD-10-CM

## 2024-11-05 PROCEDURE — G2211 COMPLEX E/M VISIT ADD ON: HCPCS | Performed by: FAMILY MEDICINE

## 2024-11-05 PROCEDURE — 99204 OFFICE O/P NEW MOD 45 MIN: CPT | Performed by: FAMILY MEDICINE

## 2024-11-05 NOTE — PATIENT INSTRUCTIONS
1. Acute right hip pain    2. Primary osteoarthritis of right hip      -Patient is following up for acute exacerbation of right hip pain due to arthritis  -Patient reports that her pain is worse at nighttime when she is moving and turning her leg.  Patient does get intermittent sharp pains with activity during the day but it is tolerable  -Patient states that she just had an infusion of rituximab and IVIG yesterday as well as taking Tylenol and Celebrex and she did not have any pain last night nor does she have pain today  -Patient may continue with Celebrex and Tylenol as directed.  -Patient also has some previous muscle relaxers which she can also take at bedtime if she has recurrence of her pain.  Patient may call us for refill of her muscle relaxer  -Patient was start formal physical therapy and home exercise program for her hip and legs to strengthen the muscles to improve her balance.  Patient is unable to get up out of a seated position without using her arms  -Patient will continue to follow with me for further treatment and recommendation  -Call direct clinic number [[phone]] at any time with questions or concerns.    Albert Yeo MD [facility]  [facility]

## 2024-11-05 NOTE — LETTER
11/5/2024      Tricia Sosa  [id number] [id number]      Dear Colleague,    Thank you for referring your patient, Tricia Ssoa, to the [facility] [place]. Please see a copy of my visit note below.    ASSESSMENT & PLAN  Patient Instructions     1. Acute right hip pain    2. Primary osteoarthritis of right hip      -Patient is following up for acute exacerbation of right hip pain due to arthritis  -Patient reports that her pain is worse at nighttime when she is moving and turning her leg.  Patient does get intermittent sharp pains with activity during the day but it is tolerable  -Patient states that she just had an infusion of rituximab and IVIG yesterday as well as taking Tylenol and Celebrex and she did not have any pain last night nor does she have pain today  -Patient may continue with Celebrex and Tylenol as directed.  -Patient also has some previous muscle relaxers which she can also take at bedtime if she has recurrence of her pain.  Patient may call us for refill of her muscle relaxer  -Patient was start formal physical therapy and home exercise program for her hip and legs to strengthen the muscles to improve her balance.  Patient is unable to get up out of a seated position without using her arms  -Patient will continue to follow with me for further treatment and recommendation  -Call direct clinic number [[phone]] at any time with questions or concerns.    Albert Yeo MD [facility] Orthoped[facility] and Sports Medicine  [facility]        -----    SUBJECTIVE  Tricia Sosa is a/an 84 year old female who is seen as a self referral for evaluation of right hip pain. The patient is seen by themselves.    Onset: 2 week(s) ago. Reports insidious onset without acute precipitating event.  Location of Pain: right groin  Rating of Pain at worst: 5/10  Rating of Pain Currently: 0/10  Worsened by: At night   Better with:   Treatments  tried: rest/activity avoidance, Celebrex, Diclofenac, and Tylenol, xray 10/4/22  Quality: sharp  Associated symptoms: no distal numbness or tingling; denies swelling or warmth  Orthopedic history: NO  Relevant surgical history: NO  Social history: social history: retired    Past Medical History:   Diagnosis Date     Arthritis      WARD (dyspnea on exertion)      External hemorrhoids without mention of complication 06/27/2005     Hemolytic anemia (H)     autoimmune     Hypothyroidism      IgA deficiency (H)      Myopia of both eyes with astigmatism and presbyopia 08/16/2017     Other malaise and fatigue      Other severe protein-calorie malnutrition      Other vitreous opacities 12/19/2006     Sleep apnea      Thyroid disease      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    Hemorrhage Subdural Trauma Without LOC Active     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 No     Social Drivers of Health     Financial Resource Strain: Low Risk  (1/5/2024)    Financial Resource Strain      Within the past 12 months, have you or your family members you live with been unable to get utilities (heat, electricity) when it was really needed?: No   Food Insecurity: Low Risk  (1/5/2024)    Food Insecurity      Within the past 12 months, did you worry that your food would run out before you got money to buy more?: No      Within the past  "12 months, did the food you bought just not last and you didn t have money to get more?: No   Transportation Needs: Low Risk  (1/5/2024)    Transportation Needs      Within the past 12 months, has lack of transportation kept you from medical appointments, getting your medicines, non-medical meetings or appointments, work, or from getting things that you need?: No   Interpersonal Safety: Low Risk  (10/24/2024)    Interpersonal Safety      Do you feel physically and emotionally safe where you currently live?: Yes      Within the past 12 months, have you been hit, slapped, kicked or otherwise physically hurt by someone?: No      Within the past 12 months, have you been humiliated or emotionally abused in other ways by your partner or ex-partner?: No   Housing Stability: Low Risk  (1/5/2024)    Housing Stability      Do you have housing? : Yes      Are you worried about losing your housing?: No         Patient's past medical, surgical, social, and family histories were reviewed today and no changes are noted.    REVIEW OF SYSTEMS:  10 point ROS is negative other than symptoms noted above in HPI, Past Medical History or as stated below  Constitutional: NEGATIVE for fever, chills, change in weight  Skin: NEGATIVE for worrisome rashes, moles or lesions  GI/: NEGATIVE for bowel or bladder changes  Neuro: NEGATIVE for weakness, dizziness or paresthesias    OBJECTIVE:  BP (!) 160/78   Ht 1.778 m (5' 10\")   Wt 63.5 kg (140 lb)   LMP  (LMP Unknown)   BMI 20.09 kg/m     General: healthy, alert and in no distress  HEENT: no scleral icterus or conjunctival erythema  Skin: no suspicious lesions or rash. No jaundice.  CV: no pedal edema  Resp: normal respiratory effort without conversational dyspnea   Psych: normal mood and affect  Gait: normal steady gait with appropriate coordination and balance  Neuro: Normal light sensory exam of lower extremity  MSK:  RIGHT HIP  Inspection:    No obvious deformity or asymmetry, level " pelvis  Palpation:    Tender about the anterior groin/joint line. Otherwise all other landmarks are nontender.  Active Range of Motion:     Flexion within normal limits, IR limited slightly by pain, ER  limited slightly by pain  Strength:    Flexion weakness, adduction weakness, abduction weakness  Special Tests:    Positive: anterior impingement (FADIR), posterior impingement (EX/AB/ER)        Independent visualization of the below image:  No results found for this or any previous visit (from the past 24 hours).    X-ray of the right hip and pelvis performed on 10/4/2022 shows moderate joint space narrowing and small osteophytes of the right hip joint.  No acute fracture or dislocation.        Albert Yeo MD [facility]      Again, thank you for allowing me to participate in the care of your patient.        Sincerely,        Albert Yeo, MD

## 2024-11-05 NOTE — PROGRESS NOTES
ASSESSMENT & PLAN  Patient Instructions     1. Acute right hip pain    2. Primary osteoarthritis of right hip      -Patient is following up for acute exacerbation of right hip pain due to arthritis  -Patient reports that her pain is worse at nighttime when she is moving and turning her leg.  Patient does get intermittent sharp pains with activity during the day but it is tolerable  -Patient states that she just had an infusion of rituximab and IVIG yesterday as well as taking Tylenol and Celebrex and she did not have any pain last night nor does she have pain today  -Patient may continue with Celebrex and Tylenol as directed.  -Patient also has some previous muscle relaxers which she can also take at bedtime if she has recurrence of her pain.  Patient may call us for refill of her muscle relaxer  -Patient was start formal physical therapy and home exercise program for her hip and legs to strengthen the muscles to improve her balance.  Patient is unable to get up out of a seated position without using her arms  -Patient will continue to follow with me for further treatment and recommendation  -Call direct clinic number [[phone]] at any time with questions or concerns.    Albert Yeo MD [facility]  [facility] [place]        -----    SUBJECTIVE  Tricia Sosa is a/an 84 year old female who is seen as a self referral for evaluation of right hip pain. The patient is seen by themselves.    Onset: 2 week(s) ago. Reports insidious onset without acute precipitating event.  Location of Pain: right groin  Rating of Pain at worst: 5/10  Rating of Pain Currently: 0/10  Worsened by: At night   Better with:   Treatments tried: rest/activity avoidance, Celebrex, Diclofenac, and Tylenol, xray 10/4/22  Quality: sharp  Associated symptoms: no distal numbness or tingling; denies swelling or warmth  Orthopedic history: NO  Relevant surgical history: NO  Social history: social history:  retired    Past Medical History:   Diagnosis Date    Arthritis     WARD (dyspnea on exertion)     External hemorrhoids without mention of complication 06/27/2005    Hemolytic anemia (H)     autoimmune    Hypothyroidism     IgA deficiency (H)     Myopia of both eyes with astigmatism and presbyopia 08/16/2017    Other malaise and fatigue     Other severe protein-calorie malnutrition     Other vitreous opacities 12/19/2006    Sleep apnea     Thyroid disease     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    Hemorrhage Subdural Trauma Without LOC Active    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 No     Social Drivers of Health     Financial Resource Strain: Low Risk  (1/5/2024)    Financial Resource Strain     Within the past 12 months, have you or your family members you live with been unable to get utilities (heat, electricity) when it was really needed?: No   Food Insecurity: Low Risk  (1/5/2024)    Food Insecurity     Within the past 12 months, did you worry that your food would run out before you got money to buy more?: No     Within the past 12 months, did the food you bought just not last and you didn t have money to get more?: No   Transportation Needs: Low Risk  (1/5/2024)    Transportation Needs     Within the past 12 months, has lack of transportation kept you from medical appointments, getting your medicines, non-medical  "meetings or appointments, work, or from getting things that you need?: No   Interpersonal Safety: Low Risk  (10/24/2024)    Interpersonal Safety     Do you feel physically and emotionally safe where you currently live?: Yes     Within the past 12 months, have you been hit, slapped, kicked or otherwise physically hurt by someone?: No     Within the past 12 months, have you been humiliated or emotionally abused in other ways by your partner or ex-partner?: No   Housing Stability: Low Risk  (1/5/2024)    Housing Stability     Do you have housing? : Yes     Are you worried about losing your housing?: No         Patient's past medical, surgical, social, and family histories were reviewed today and no changes are noted.    REVIEW OF SYSTEMS:  10 point ROS is negative other than symptoms noted above in HPI, Past Medical History or as stated below  Constitutional: NEGATIVE for fever, chills, change in weight  Skin: NEGATIVE for worrisome rashes, moles or lesions  GI/: NEGATIVE for bowel or bladder changes  Neuro: NEGATIVE for weakness, dizziness or paresthesias    OBJECTIVE:  BP (!) 160/78   Ht 1.778 m (5' 10\")   Wt 63.5 kg (140 lb)   LMP  (LMP Unknown)   BMI 20.09 kg/m     General: healthy, alert and in no distress  HEENT: no scleral icterus or conjunctival erythema  Skin: no suspicious lesions or rash. No jaundice.  CV: no pedal edema  Resp: normal respiratory effort without conversational dyspnea   Psych: normal mood and affect  Gait: normal steady gait with appropriate coordination and balance  Neuro: Normal light sensory exam of lower extremity  MSK:  RIGHT HIP  Inspection:    No obvious deformity or asymmetry, level pelvis  Palpation:    Tender about the anterior groin/joint line. Otherwise all other landmarks are nontender.  Active Range of Motion:     Flexion within normal limits, IR limited slightly by pain, ER  limited slightly by pain  Strength:    Flexion weakness, adduction weakness, abduction " weakness  Special Tests:    Positive: anterior impingement (FADIR), posterior impingement (EX/AB/ER)        Independent visualization of the below image:  No results found for this or any previous visit (from the past 24 hours).    X-ray of the right hip and pelvis performed on 10/4/2022 shows moderate joint space narrowing and small osteophytes of the right hip joint.  No acute fracture or dislocation.        Albert Yeo MD [facility] and Orthopedic Care

## 2024-11-07 ASSESSMENT — ACTIVITIES OF DAILY LIVING (ADL)
SPORTS_COUNT: 8
HOS_ADL_SCORE(%): 28.33
ADL_HIGHEST_POTENTIAL_SCORE: 68
WALKING_INITIALLY: MODERATE DIFFICULTY
SWELLING: I HAVE THE SYMPTOM BUT IT DOES NOT AFFECT MY ACTIVITY
TWISTING/PIVOTING ON INVOLVED LEG: MODERATE DIFFICULTY
ROLLING_OVER_IN_BED: EXTREME DIFFICULTY
WALKING_DOWN_STEEP_HILLS: MODERATE DIFFICULTY
HOW_WOULD_YOU_RATE_YOUR_CURRENT_LEVEL_OF_FUNCTION?: ABNORMAL
GETTING INTO AND OUT OF AN AVERAGE CAR: MODERATE DIFFICULTY
PAIN: I HAVE THE SYMPTOM BUT IT DOES NOT AFFECT MY ACTIVITY
STAND: ACTIVITY IS VERY DIFFICULT
HEAVY_WORK: MODERATE DIFFICULTY
RAW_SCORE: 32
SITTING_FOR_15_MINUTES: NO DIFFICULTY AT ALL
SPORTS_HIGHEST_POTENTIAL_SCORE: 32
SIT WITH YOUR KNEE BENT: ACTIVITY IS NOT DIFFICULT
HOW_WOULD_YOU_RATE_YOUR_CURRENT_LEVEL_OF_FUNCTION_DURING_YOUR_USUAL_ACTIVITIES_OF_DAILY_LIVING_FROM_0_TO_100_WITH_100_BEING_YOUR_LEVEL_OF_FUNCTION_PRIOR_TO_YOUR_HIP_PROBLEM_AND_0_BEING_THE_INABILITY_TO_PERFORM_ANY_OF_YOUR_USUAL_DAILY_ACTIVITIES?: 50
GETTING_INTO_AND_OUT_OF_AN_AVERAGE_CAR: MODERATE DIFFICULTY
WEAKNESS: THE SYMPTOM AFFECTS MY ACTIVITY SEVERELY
SQUAT: I AM UNABLE TO DO THE ACTIVITY
AS_A_RESULT_OF_YOUR_KNEE_INJURY,_HOW_WOULD_YOU_RATE_YOUR_CURRENT_LEVEL_OF_DAILY_ACTIVITY?: ABNORMAL
RUNNING_ONE_MILE: UNABLE TO DO
LIGHT_TO_MODERATE_WORK: SLIGHT DIFFICULTY
STANDING FOR 15 MINUTES: UNABLE TO DO
WALKING_UP_STEEP_HILLS: EXTREME DIFFICULTY
HOS_ADL_ITEM_SCORE_TOTAL: 17
PUTTING ON SOCKS AND SHOES: EXTREME DIFFICULTY
AS_A_RESULT_OF_YOUR_KNEE_INJURY,_HOW_WOULD_YOU_RATE_YOUR_CURRENT_LEVEL_OF_DAILY_ACTIVITY?: ABNORMAL
GO DOWN STAIRS: ACTIVITY IS VERY DIFFICULT
GO DOWN STAIRS: ACTIVITY IS VERY DIFFICULT
WALK: ACTIVITY IS FAIRLY DIFFICULT
SIT WITH YOUR KNEE BENT: ACTIVITY IS NOT DIFFICULT
STARTING_AND_STOPPING_QUICKLY: MODERATE DIFFICULTY
HOW_WOULD_YOU_RATE_THE_OVERALL_FUNCTION_OF_YOUR_KNEE_DURING_YOUR_USUAL_DAILY_ACTIVITIES?: ABNORMAL
WALKING_APPROXIMATELY_10_MINUTES: UNABLE TO DO
LIGHT_TO_MODERATE_WORK: SLIGHT DIFFICULTY
RISE FROM A CHAIR: I AM UNABLE TO DO THE ACTIVITY
GOING UP 1 FLIGHT OF STAIRS: UNABLE TO DO
SPORTS_TOTAL_ITEM_SCORE: INCOMPLETE
ADL_TOTAL_ITEM_SCORE: 0
LOW_IMPACT_ACTIVITIES_LIKE_FAST_WALKING: UNABLE TO DO
PLEASE_INDICATE_YOR_PRIMARY_REASON_FOR_REFERRAL_TO_THERAPY:: KNEE
ROLLING OVER IN BED: EXTREME DIFFICULTY
WALKING_INITIALLY: MODERATE DIFFICULTY
KNEEL ON THE FRONT OF YOUR KNEE: I AM UNABLE TO DO THE ACTIVITY
HOS_ADL_HIGHEST_POTENTIAL_SCORE: 60
STIFFNESS: I DO NOT HAVE THE SYMPTOM
HEAVY_WORK: MODERATE DIFFICULTY
GIVING WAY, BUCKLING OR SHIFTING OF KNEE: THE SYMPTOM AFFECTS MY ACTIVITY SLIGHTLY
STAND: ACTIVITY IS VERY DIFFICULT
WALK: ACTIVITY IS FAIRLY DIFFICULT
SPORTS_SCORE(%): INCOMPLETE
JUMPING: UNABLE TO DO
STEPPING UP AND DOWN CURBS: EXTREME DIFFICULTY
ADL_SCORE(%): 0
ABILITY_TO_PERFORM_ACTIVITY_WITH_YOUR_NORMAL_TECHNIQUE: MODERATE DIFFICULTY
LIMPING: I DO NOT HAVE THE SYMPTOM
WALKING_UP_STEEP_HILLS: EXTREME DIFFICULTY
GO UP STAIRS: ACTIVITY IS VERY DIFFICULT
DEEP SQUATTING: UNABLE TO DO
PLEASE_INDICATE_YOR_PRIMARY_REASON_FOR_REFERRAL_TO_THERAPY:: HIP
KNEEL ON THE FRONT OF YOUR KNEE: I AM UNABLE TO DO THE ACTIVITY
STEPPING_UP_AND_DOWN_CURBS: EXTREME DIFFICULTY
STIFFNESS: I DO NOT HAVE THE SYMPTOM
DEEP_SQUATTING: UNABLE TO DO
WALKING_DOWN_STEEP_HILLS: MODERATE DIFFICULTY
WALKING_15_MINUTES_OR_GREATER: UNABLE TO DO
GO UP STAIRS: ACTIVITY IS VERY DIFFICULT
STANDING_FOR_15_MINUTES: UNABLE TO DO
GIVING WAY, BUCKLING OR SHIFTING OF KNEE: THE SYMPTOM AFFECTS MY ACTIVITY SLIGHTLY
SQUAT: I AM UNABLE TO DO THE ACTIVITY
RISE FROM A CHAIR: I AM UNABLE TO DO THE ACTIVITY
HOW_WOULD_YOU_RATE_THE_CURRENT_FUNCTION_OF_YOUR_KNEE_DURING_YOUR_USUAL_DAILY_ACTIVITIES_ON_A_SCALE_FROM_0_TO_100_WITH_100_BEING_YOUR_LEVEL_OF_KNEE_FUNCTION_PRIOR_TO_YOUR_INJURY_AND_0_BEING_THE_INABILITY_TO_PERFORM_ANY_OF_YOUR_USUAL_DAILY_ACTIVITIES?: 50
GOING_UP_1_FLIGHT_OF_STAIRS: UNABLE TO DO
KNEE_ACTIVITY_OF_DAILY_LIVING_SCORE: 45.71
TWISTING/PIVOTING_ON_INVOLVED_LEG: MODERATE DIFFICULTY
WEAKNESS: THE SYMPTOM AFFECTS MY ACTIVITY SEVERELY
GOING DOWN 1 FLIGHT OF STAIRS: EXTREME DIFFICULTY
HOW_WOULD_YOU_RATE_THE_OVERALL_FUNCTION_OF_YOUR_KNEE_DURING_YOUR_USUAL_DAILY_ACTIVITIES?: ABNORMAL
KNEE_ACTIVITY_OF_DAILY_LIVING_SUM: 32
HOW_WOULD_YOU_RATE_THE_CURRENT_FUNCTION_OF_YOUR_KNEE_DURING_YOUR_USUAL_DAILY_ACTIVITIES_ON_A_SCALE_FROM_0_TO_100_WITH_100_BEING_YOUR_LEVEL_OF_KNEE_FUNCTION_PRIOR_TO_YOUR_INJURY_AND_0_BEING_THE_INABILITY_TO_PERFORM_ANY_OF_YOUR_USUAL_DAILY_ACTIVITIES?: 50
LIMPING: I DO NOT HAVE THE SYMPTOM
HOW_WOULD_YOU_RATE_YOUR_CURRENT_LEVEL_OF_FUNCTION_DURING_YOUR_USUAL_ACTIVITIES_OF_DAILY_LIVING_FROM_0_TO_100_WITH_100_BEING_YOUR_LEVEL_OF_FUNCTION_PRIOR_TO_YOUR_HIP_PROBLEM_AND_0_BEING_THE_INABILITY_TO_PERFORM_ANY_OF_YOUR_USUAL_DAILY_ACTIVITIES?: 50
PUTTING_ON_SOCKS_AND_SHOES: EXTREME DIFFICULTY
GOING_DOWN_1_FLIGHT_OF_STAIRS: EXTREME DIFFICULTY
WALKING_FOR_APPROXIMATELY_10_MINUTES: UNABLE TO DO
PAIN: I HAVE THE SYMPTOM BUT IT DOES NOT AFFECT MY ACTIVITY
SWELLING: I HAVE THE SYMPTOM BUT IT DOES NOT AFFECT MY ACTIVITY
SITTING FOR 15 MINUTES: NO DIFFICULTY AT ALL
ADL_COUNT: 17
WALKING_15_MINUTES_OR_GREATER: UNABLE TO DO

## 2024-11-08 ENCOUNTER — HOSPITAL ENCOUNTER (OUTPATIENT)
Dept: RADIOLOGY | Facility: CLINIC | Age: 84
Discharge: HOME | End: 2024-11-08
Attending: INTERNAL MEDICINE
Payer: MEDICARE

## 2024-11-08 ENCOUNTER — OFFICE VISIT (OUTPATIENT)
Dept: RHEUMATOLOGY | Facility: CLINIC | Age: 84
End: 2024-11-08
Attending: STUDENT IN AN ORGANIZED HEALTH CARE EDUCATION/TRAINING PROGRAM
Payer: MEDICARE

## 2024-11-08 ENCOUNTER — LAB (OUTPATIENT)
Dept: LAB | Facility: CLINIC | Age: 84
End: 2024-11-08
Attending: INTERNAL MEDICINE
Payer: MEDICARE

## 2024-11-08 VITALS
DIASTOLIC BLOOD PRESSURE: 64 MMHG | WEIGHT: 141.6 LBS | SYSTOLIC BLOOD PRESSURE: 139 MMHG | HEIGHT: 70 IN | HEART RATE: 78 BPM | OXYGEN SATURATION: 96 % | BODY MASS INDEX: 20.27 KG/M2

## 2024-11-08 DIAGNOSIS — M79.641 BILATERAL HAND PAIN: ICD-10-CM

## 2024-11-08 DIAGNOSIS — Z79.620 LONG-TERM CURRENT USE OF RITUXIMAB: ICD-10-CM

## 2024-11-08 DIAGNOSIS — Z86.2 HISTORY OF AUTOIMMUNE HEMOLYTIC ANEMIA: ICD-10-CM

## 2024-11-08 DIAGNOSIS — M79.642 BILATERAL HAND PAIN: ICD-10-CM

## 2024-11-08 DIAGNOSIS — M19.049 HAND ARTHRITIS: ICD-10-CM

## 2024-11-08 DIAGNOSIS — M25.551 PAIN OF RIGHT HIP: ICD-10-CM

## 2024-11-08 LAB — URATE SERPL-MCNC: 3.4 MG/DL (ref 2.4–5.7)

## 2024-11-08 PROCEDURE — 86618 LYME DISEASE ANTIBODY: CPT

## 2024-11-08 PROCEDURE — 83516 IMMUNOASSAY NONANTIBODY: CPT

## 2024-11-08 PROCEDURE — 73560 X-RAY EXAM OF KNEE 1 OR 2: CPT | Mod: 50

## 2024-11-08 PROCEDURE — 86200 CCP ANTIBODY: CPT

## 2024-11-08 PROCEDURE — 86235 NUCLEAR ANTIGEN ANTIBODY: CPT

## 2024-11-08 PROCEDURE — 84550 ASSAY OF BLOOD/URIC ACID: CPT

## 2024-11-08 PROCEDURE — 86431 RHEUMATOID FACTOR QUANT: CPT

## 2024-11-08 PROCEDURE — 36415 COLL VENOUS BLD VENIPUNCTURE: CPT

## 2024-11-08 PROCEDURE — 73522 X-RAY EXAM HIPS BI 3-4 VIEWS: CPT

## 2024-11-08 RX ORDER — PREDNISONE 5 MG/1
5 TABLET ORAL 2 TIMES DAILY
Qty: 60 TABLET | Refills: 1 | Status: SHIPPED | OUTPATIENT
Start: 2024-11-08

## 2024-11-08 RX ORDER — BACLOFEN 10 MG/1
10 TABLET ORAL 3 TIMES DAILY
COMMUNITY

## 2024-11-08 RX ORDER — ACETAMINOPHEN 500 MG
1000 TABLET ORAL EVERY 6 HOURS PRN
COMMUNITY

## 2024-11-08 NOTE — PROGRESS NOTES
This document was created using a software with less than 100% fidelity, at times resulting in unintended, even erroneous syntax and grammar.  The reader is advised to keep this under consideration while reviewing, interpreting this note.      Rheumatology Consult Note      Tricia Sosa     YOB: 1940 Age: 84 year old    Date of visit: 11/08/24    PCP: Katya Morales    Chief Complaint   Acute onset of pain in knees, hips, fingers since July 2024.  (On rituximab infusions every 2 months for autoimmune hemolytic anemia, on rituximab since 2019)        Assessment and Plan     We had detailed discussion and went over the differences between age-related osteoarthritis (joint wear and tear) versus inflammatory arthritis (rheumatoid arthritis, crystal arthropathies, psoriatic) and she seems to have a good understanding of the difference.  She clearly has chronic osteoarthritis (spine, knees, perhaps the right hip) but since July 2024 noticed acute worsening of pain and some swelling in the distal interphalangeal knuckles, knees and the hips.  Denies problems with the shoulders.  She is already on fairly high dose rituximab for autoimmune hemolytic anemia.    Will check rheumatoid serologies (RF, CCP, along with inflammatory markers (ESR, CRP.  Check uric acid, Lyme (initial Lyme negative).  Check ME-3 and MPO.  X-rays of the hips especially the right hip to see if there is end-stage arthrosis in the right hip joint or whether her hip pain is due to an inflammatory arthritis.  The distribution of joint swelling in her fingers (sparing of MCPs, involvement of PIP and DIP knuckles) is atypical for rheumatoid arthritis, this can be seen in psoriatic arthritis, polyarticular gout or pseudogout or erosive osteoarthritis.    For symptomatic pain relief she will start prednisone 5 mg twice daily.  I suggested she reduce Celebrex to 200 mg once daily for a week and if her symptoms are better on prednisone she can  thereafter discontinue Celebrex.    Follow-up 3 weeks    Thank you for asked me to see this pleasant lady.    CC PCP      LI     Tricia is a very pleasant 84-year-old lady with a history of autoimmune hemolytic anemia for which she has been getting Rituxan infusions since 2019.  She follows with hematology and is getting rituximab infusions every 2 months, IVIG was also added recently.  She has a history of osteoarthritis involving her spine, was told she had osteoarthritis in the knee joints.  She was relatively comfortable and functional until July 2020 when she started noticing acute worsening and swelling of her knee pain.  This was followed by pain in her fingers particularly the PIP and DIP knuckles, she noticed some neck stiffness and her hips were also quite uncomfortable especially the right hip.  The worsening pain in her hands, hips was a significant change for her and she was quite uncomfortable.  She was seen by orthopedics, they injected her knee joints which relieved her knee pain to some extent.  She was also told she has osteoarthritis in the knee joints.  She denies diurnal variation in her joint symptoms but has difficulty sleeping at night because of the pain in her hands and hips.  She denies any problems with her shoulders, ankles or toes.  She tried diclofenac gel for her hand pain but it was ineffective.  Currently she is taking Celebrex 200 mg twice daily but not sure if it is helping her symptoms.  Interestingly she mentions in the morning when she wakes up she is relatively comfortable.  She had a COVID infection in August 2024.  Because of being on rituximab she is immunosuppressed and rituximab significantly reduces the likelihood of response to vaccines.  She denies fevers or chills.  Has some loose stools but no fausto diarrhea.  Family history is negative for rheumatoid arthritis.    Labs reviewed.  WBC 6.9, hemoglobin 10.9, platelets 228, TSH 3.62.  Lyme negative.    Hand x-rays  reviewed, the MCP joints are normal without any erosions, DJD noted in the PIP and DIP knuckles with gullwing changes in the right little finger DIP knuckle.    She denies any history of family history of psoriasis, rheumatoid arthritis or lupus.    She is , retired (post ), has a college education, lives with her son and his family in [place].  She does not smoke, no alcohol.    Past medical history notable for autoimmune hemolytic anemia, hypothyroidism.  She has also been on steroids in the past for the hemolytic anemia.         Active Problem List     Patient Active Problem List   Diagnosis    Lymphocytic colitis    Acquired hypothyroidism    CVID (common variable immunodeficiency) (H)    B12 deficiency    CARDIOVASCULAR SCREENING; LDL GOAL LESS THAN 130    Other autoimmune hemolytic anemia (H)    Right-sided low back pain with right-sided sciatica, unspecified chronicity    Autoimmune hemolytic anemia (H)    Bronchiectasis (H)    Mild obstructive sleep apnea    Selective deficiency of IgG subclasses (H)    Anemia, iron deficiency    Mild protein-calorie malnutrition (H)    Ataxia, unspecified    DDD (degenerative disc disease), lumbar    Diarrhea    Other acquired deformities of left foot    Other acquired deformities of right foot    Other amnesia    Other disturbances of skin sensation     Past Medical History     Past Medical History:   Diagnosis Date    Arthritis     WARD (dyspnea on exertion)     External hemorrhoids without mention of complication 06/27/2005    Hemolytic anemia (H)     autoimmune    Hypothyroidism     IgA deficiency (H)     Myopia of both eyes with astigmatism and presbyopia 08/16/2017    Other malaise and fatigue     Other severe protein-calorie malnutrition     Other vitreous opacities 12/19/2006    Sleep apnea     Thyroid disease     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    Hemorrhage Subdural Trauma Without LOC Active    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, and posterior vitreous detachment    Urinary tract infection, site not specified     Recurrent UTI's     Past Surgical History     Past Surgical History:   Procedure Laterality Date    BIOPSY MUSCLE DIAGNOSTIC (LOCATION) Right 1/16/2024    Procedure: BIOPSY, MUSCLE - RIGHT THIGH;  Surgeon: Dickson Madrid MD;  Location: [facility] OR    COLONOSCOPY N/A 4/26/2016    Procedure: COLONOSCOPY;  Surgeon: Dnotae Del Rio MD;  Location:  GI    COLONOSCOPY N/A 2/22/2024    Procedure: Colonoscopy with polypectomy by using cold snare and two hemoclips applied;  Surgeon: Dontae Del Rio MD;  Location:  GI    ENT SURGERY  1985    Thyroid lobectomy    ESOPHAGOSCOPY, GASTROSCOPY, DUODENOSCOPY (EGD), COMBINED N/A 2/22/2024    Procedure: ESOPHAGOGASTRODUODENOSCOPY, WITH BIOPSY by using cold forcep;  Surgeon: Dontae Del Rio MD;  Location:  GI    EYE SURGERY Bilateral 04/20/2021    Cataract Surgery    HC CYSTOSCOPY,INSERT URETERAL STENT      Ureteral stent insertion    HC FLEX SIGMOIDOSCOPY W BIOPSY  5/30/00     LAPAROSCOPY, SURGICAL; CHOLECYSTECTOMY  1992     THYROIDECTOMY      LIGATION OF HEMORRHOID(S)      Hemorrhoidectomy    UPPER GI ENDOSCOPY,BIOPSY  10/01/01    Z LIGATE FALLOPIAN TUBE      ZZ COLONOSCOPY W BIOPSY  4/26/00    ZZ COLONOSCOPY W BIOPSY  10/8/03    ZZ COLONOSCOPY W BIOPSY  6/27/05    REPEAT IN 5 YEARS     Allergy     Allergies   Allergen Reactions    Doxycycline      Current Medication List     Current Outpatient Medications   Medication Sig Dispense Refill    acetaminophen (TYLENOL) 500 MG tablet Take 1,000 mg by mouth every 6 hours as needed for mild pain.      baclofen (LIORESAL) 10 MG tablet Take 10 mg by mouth 3 times daily.      celecoxib (CELEBREX) 100 MG capsule Take 1-2 capsules (100-200 mg) by mouth 2 times daily. 90 capsule 1    cyanocobalamin (VITAMIN  B-12) 1000 MCG tablet   3    folic acid (FOLVITE) 1 MG tablet   3     "hydroCHLOROthiazide 12.5 MG tablet TAKE 1 TABLET AS NEEDED FOR EDEMA 30 tablet 2    ketoconazole (NIZORAL) 2 % external shampoo Use every other day to scalp as needed 120 mL 0    levothyroxine (SYNTHROID/LEVOTHROID) 150 MCG tablet Take 1 tablet (150 mcg) by mouth daily 90 tablet 3    omega 3 1000 MG CAPS Take 1,280 mg by mouth daily      triamcinolone (KENALOG) 0.1 % external ointment Apply topically 2 times daily       Current Facility-Administered Medications   Medication Dose Route Frequency Provider Last Rate Last Admin    lidocaine 1 % injection 7 mL  7 mL      7 mL at 24 1426    lidocaine 1 % injection 7 mL  7 mL      7 mL at 24 1426    triamcinolone (KENALOG-40) injection 40 mg  40 mg      40 mg at 24 1426    triamcinolone (KENALOG-40) injection 40 mg  40 mg      40 mg at 24 1426       Current Facility-Administered Medications   Medication Dose Route Frequency Provider Last Rate Last Admin        Family History     Family History   Problem Relation Age of Onset    Colon Cancer Mother 90    Cerebrovascular Disease Father          at age 85    Breast Cancer Sister     Hyperlipidemia Sister     Depression Sister     Anxiety Disorder Sister     Thyroid Disease Sister     Lung Cancer Sister     Breast Cancer Sister     Other Cancer Sister         Lung    Dementia Brother     Prostate Cancer Brother     Thyroid Disease Brother     Dementia Brother     Thyroid Disease Brother     Pancreatic Cancer Brother     Colon Cancer Niece     Other Cancer Niece         leukemia         Physical Exam     COMPREHENSIVE EXAMINATION:  Vitals:    24 1522   BP: 139/64   BP Location: Right arm   Patient Position: Sitting   Cuff Size: Adult Regular   Pulse: 78   SpO2: 96%   Weight: 64.2 kg (141 lb 9.6 oz)   Height: 1.778 m (5' 10\")     Pleasant elderly lady, alert and oriented x 3, uses a walker to get by.  In moderate discomfort this afternoon (hands, hips, knees)    Head/neck.  No rash, no jaundice, " conjunctiva pale, no alopecia, no ocular redness, no enlargement of the major salivary glands, no butterfly rash    Lungs clear, no crackles or wheezing    Heart sounds regular    Abdomen soft, nontender    Musculoskeletal:    Right hand.  No swelling or synovitis of the MCP joints noted.+ Moderate puffiness and tenderness of the fourth PIP and fifth DIP joint noted.  Hypertrophic changes noted in the DIP joint.  No swan-neck or boutonniere deformities.    Left hand.  No swelling or synovitis of the MCP joints.+ Mild puffiness and moderate tenderness of 3, 4, 5 PIP joints.  No swan-neck or boutonniere deformities.    Right wrist.+ Synovitis,+ tenderness    Left wrist.+ Synovitis,+ tenderness    Elbows full range of motion, no synovitis or redness    Shoulders normal abduction and external rotation bilaterally    Cervical spine lateral rotation restricted bilaterally    Spine no paraspinal tenderness    Right hip.  Flexion and internal rotation is painful and restricted in the right hip.    Left hip full range of motion including flexion and internal rotation without discomfort.    Knees.  Moderate knee effusions are noted bilaterally without redness, knee flexion  degrees bilaterally    Ankles no swelling or synovitis    Feet.  Some hammertoe changes are noted in the toes but there is no swelling, synovitis or tenderness of the toes or MTP joints.  No foot drop.    Labs / Imaging (select studies)     Rheumatoid Factor   Date Value Ref Range Status   04/15/2024 <10 <14 IU/mL Final   09/16/2022 <6 <12 IU/mL Final     Uric Acid   Date Value Ref Range Status   09/16/2022 3.7 2.6 - 6.0 mg/dL Final      Hemoglobin   Date Value Ref Range Status   11/01/2024 10.9 (L) 11.7 - 15.7 g/dL Final   09/16/2024 10.7 (L) 11.7 - 15.7 g/dL Final   07/22/2024 10.5 (L) 11.7 - 15.7 g/dL Final   07/06/2021 10.1 (L) 11.7 - 15.7 g/dL Final   06/29/2021 9.8 (L) 11.7 - 15.7 g/dL Final   06/22/2021 9.8 (L) 11.7 - 15.7 g/dL Final     Urea  Nitrogen   Date Value Ref Range Status   07/22/2024 14.6 8.0 - 23.0 mg/dL Final   05/28/2024 16.5 8.0 - 23.0 mg/dL Final   05/01/2024 12.8 8.0 - 23.0 mg/dL Final   09/16/2022 13 7 - 30 mg/dL Final   07/25/2022 13 7 - 30 mg/dL Final   06/15/2022 16 7 - 30 mg/dL Final   06/29/2021 20 7 - 30 mg/dL Final   06/03/2021 12 7 - 30 mg/dL Final   04/08/2021 11 7 - 30 mg/dL Final     Creatinine   Date Value Ref Range Status   05/19/2015 1.0 0.52 - 1.04 mg/dL Final     Sed Rate   Date Value Ref Range Status   07/29/2020 119 (H) 0 - 30 mm/h Final   06/04/2015 127 (H) 0 - 30 mm/h Final     Comment:     Results confirmed by repeat test   04/14/2015 124 (H) 0 - 30 mm/h Final     Comment:     Results confirmed by repeat test     Erythrocyte Sedimentation Rate   Date Value Ref Range Status   04/15/2024 18 0 - 30 mm/hr Final   09/16/2022 40 (H) 0 - 30 mm/hr Final   03/17/2022 56 (H) 0 - 30 mm/hr Final     CRP Inflammation   Date Value Ref Range Status   04/14/2015 <2.9 0.0 - 8.0 mg/L Final     AST   Date Value Ref Range Status   07/22/2024 28 0 - 45 U/L Final   05/28/2024 26 0 - 45 U/L Final     Comment:     Reference intervals for this test were updated on 6/12/2023 to more accurately reflect our healthy population. There may be differences in the flagging of prior results with similar values performed with this method. Interpretation of those prior results can be made in the context of the updated reference intervals.   05/01/2024 27 0 - 45 U/L Final     Comment:     Reference intervals for this test were updated on 6/12/2023 to more accurately reflect our healthy population. There may be differences in the flagging of prior results with similar values performed with this method. Interpretation of those prior results can be made in the context of the updated reference intervals.   06/29/2021 30 0 - 45 U/L Final   06/03/2021 44 0 - 45 U/L Final   04/08/2021 31 0 - 45 U/L Final     Albumin   Date Value Ref Range Status   07/22/2024  4.2 3.5 - 5.2 g/dL Final   05/28/2024 4.3 3.5 - 5.2 g/dL Final   05/01/2024 4.2 3.5 - 5.2 g/dL Final   09/16/2022 4.2 3.4 - 5.0 g/dL Final   07/25/2022 3.9 3.4 - 5.0 g/dL Final   06/15/2022 3.9 3.4 - 5.0 g/dL Final   06/29/2021 3.9 3.4 - 5.0 g/dL Final   06/03/2021 4.0 3.4 - 5.0 g/dL Final   04/08/2021 3.9 3.4 - 5.0 g/dL Final     Alkaline Phosphatase   Date Value Ref Range Status   07/22/2024 118 40 - 150 U/L Final   05/28/2024 120 40 - 150 U/L Final   05/01/2024 110 40 - 150 U/L Final     Comment:     Reference intervals for this test were updated on 11/14/2023 to more accurately reflect our healthy population. There may be differences in the flagging of prior results with similar values performed with this method. Interpretation of those prior results can be made in the context of the updated reference intervals.   06/29/2021 97 40 - 150 U/L Final   06/03/2021 103 40 - 150 U/L Final   04/08/2021 117 40 - 150 U/L Final     ALT   Date Value Ref Range Status   07/22/2024 12 0 - 50 U/L Final   05/28/2024 14 0 - 50 U/L Final     Comment:     Reference intervals for this test were updated on 6/12/2023 to more accurately reflect our healthy population. There may be differences in the flagging of prior results with similar values performed with this method. Interpretation of those prior results can be made in the context of the updated reference intervals.     05/01/2024 14 0 - 50 U/L Final     Comment:     Reference intervals for this test were updated on 6/12/2023 to more accurately reflect our healthy population. There may be differences in the flagging of prior results with similar values performed with this method. Interpretation of those prior results can be made in the context of the updated reference intervals.     06/29/2021 36 0 - 50 U/L Final   06/03/2021 22 0 - 50 U/L Final   04/08/2021 24 0 - 50 U/L Final     Rheumatoid Factor   Date Value Ref Range Status   04/15/2024 <10 <14 IU/mL Final   09/16/2022 <6 <12  IU/mL Final          Immunization History     Immunization History   Administered Date(s) Administered    COVID-19 12+ (Pfizer) 10/02/2023    COVID-19 Bivalent 18+ (Moderna) 12/12/2022    COVID-19 MONOVALENT 12+ (Pfizer) 02/12/2021, 03/05/2021, 08/31/2021    Flu, Unspecified 11/01/2010, 10/18/2011, 10/09/2013, 09/12/2014    Influenza (H1N1) 12/01/2009, 12/21/2009, 11/01/2010, 11/01/2011    Influenza (High Dose) Trivalent,PF (Fluzone) 12/04/2015, 11/02/2016, 10/09/2017, 11/01/2018, 10/30/2019    Influenza (IIV3) PF 10/12/2001, 09/17/2009, 10/08/2012    Influenza Vaccine 18-64 (Flublok) 10/30/2019    Influenza Vaccine 65+ (Fluzone HD) 10/15/2020, 10/28/2021, 10/04/2022, 10/02/2023    Pneumococcal (PCV 7) 01/08/1970, 03/11/2004    Pneumococcal 23 valent 03/11/2004, 07/01/2008, 09/12/2014    TD,PF 7+ (Tenivac) 10/23/2001, 10/28/2021    TDAP (Adacel,Boostrix) 04/21/2011    Zoster recombinant adjuvanted (SHINGRIX) 02/06/2023, 04/10/2023

## 2024-11-09 LAB
B BURGDOR IGG+IGM SER QL: 0.07
RHEUMATOID FACT SERPL-ACNC: <10 IU/ML

## 2024-11-12 ENCOUNTER — THERAPY VISIT (OUTPATIENT)
Dept: OCCUPATIONAL THERAPY | Facility: CLINIC | Age: 84
End: 2024-11-12
Attending: STUDENT IN AN ORGANIZED HEALTH CARE EDUCATION/TRAINING PROGRAM
Payer: MEDICARE

## 2024-11-12 ENCOUNTER — THERAPY VISIT (OUTPATIENT)
Dept: PHYSICAL THERAPY | Facility: CLINIC | Age: 84
End: 2024-11-12
Attending: FAMILY MEDICINE
Payer: MEDICARE

## 2024-11-12 ENCOUNTER — TELEPHONE (OUTPATIENT)
Dept: ORTHOPEDICS | Facility: CLINIC | Age: 84
End: 2024-11-12

## 2024-11-12 DIAGNOSIS — M19.049 HAND ARTHRITIS: ICD-10-CM

## 2024-11-12 DIAGNOSIS — M17.0 OSTEOARTHRITIS OF PATELLOFEMORAL JOINTS OF BOTH KNEES: ICD-10-CM

## 2024-11-12 DIAGNOSIS — M79.642 BILATERAL HAND PAIN: Primary | ICD-10-CM

## 2024-11-12 DIAGNOSIS — M25.551 ACUTE RIGHT HIP PAIN: ICD-10-CM

## 2024-11-12 DIAGNOSIS — M16.11 PRIMARY OSTEOARTHRITIS OF RIGHT HIP: Primary | ICD-10-CM

## 2024-11-12 DIAGNOSIS — M25.561 BILATERAL KNEE PAIN: Primary | ICD-10-CM

## 2024-11-12 DIAGNOSIS — M62.81 GENERALIZED MUSCLE WEAKNESS: ICD-10-CM

## 2024-11-12 DIAGNOSIS — M79.641 BILATERAL HAND PAIN: Primary | ICD-10-CM

## 2024-11-12 DIAGNOSIS — M16.11 PRIMARY OSTEOARTHRITIS OF RIGHT HIP: ICD-10-CM

## 2024-11-12 DIAGNOSIS — M25.562 BILATERAL KNEE PAIN: Primary | ICD-10-CM

## 2024-11-12 LAB
CCP AB SER IA-ACNC: 1.6 U/ML
ENA SM IGG SER IA-ACNC: <0.7 U/ML
ENA SM IGG SER IA-ACNC: NEGATIVE
ENA SS-A AB SER IA-ACNC: 4.9 U/ML
ENA SS-A AB SER IA-ACNC: NEGATIVE
ENA SS-B IGG SER IA-ACNC: <0.6 U/ML
ENA SS-B IGG SER IA-ACNC: NEGATIVE
MYELOPEROXIDASE AB SER IA-ACNC: 0.3 U/ML
MYELOPEROXIDASE AB SER IA-ACNC: NEGATIVE
PROTEINASE3 AB SER IA-ACNC: <1 U/ML
PROTEINASE3 AB SER IA-ACNC: NEGATIVE
U1 SNRNP IGG SER IA-ACNC: 1.7 U/ML
U1 SNRNP IGG SER IA-ACNC: NEGATIVE

## 2024-11-12 PROCEDURE — 97161 PT EVAL LOW COMPLEX 20 MIN: CPT | Mod: GP | Performed by: PHYSICAL THERAPIST

## 2024-11-12 PROCEDURE — 97110 THERAPEUTIC EXERCISES: CPT | Mod: GP | Performed by: PHYSICAL THERAPIST

## 2024-11-12 PROCEDURE — 97165 OT EVAL LOW COMPLEX 30 MIN: CPT | Mod: GO | Performed by: OCCUPATIONAL THERAPIST

## 2024-11-12 PROCEDURE — 97110 THERAPEUTIC EXERCISES: CPT | Mod: GO | Performed by: OCCUPATIONAL THERAPIST

## 2024-11-12 PROCEDURE — 97760 ORTHOTIC MGMT&TRAING 1ST ENC: CPT | Mod: GO | Performed by: OCCUPATIONAL THERAPIST

## 2024-11-12 NOTE — PROGRESS NOTES
OCCUPATIONAL THERAPY EVALUATION  Type of Visit: Evaluation        Fall Risk Screen:  Fall screen completed by: PT  Have you fallen 2 or more times in the past year?: No  Have you fallen and had an injury in the past year?: No  Is patient a fall risk?: No    Subjective        Presenting condition or subjective complaint: (Patient-Rptd) I was referred to strengthen my knees but my whole body is weak.  Plus I have pain in the back of my head and neck and extreme, constant pain in my fingers, hands, wrists.  Date of onset: 10/25/24 (order date)    Relevant medical history: (Patient-Rptd) Anemia; Arthritis; Dizziness; Hearing problems; Osteoarthritis; Pain at night or rest; Progressive neurological deficits; Severe dizziness; Significant weakness; Sleep disorder like apnea; Thyroid problems; Vision problems   Dates & types of surgery: (Patient-Rptd) 1975 Laparoscopic tubal sterilization;  1984 Thyroid lobectomy;  1985 Hemorrhoidectomy;  1993 Laporoscopic cholecystectomy;  2004 Sinus;  2016 mass in thumb;  2021 Cataracts;  2024 Piggyback cataract surgery; '24 muscle biopsy;  '24 YAG laser CAP    Prior diagnostic imaging/testing results: (Patient-Rptd) Other (Patient-Rptd) I have had so many x-rays, CT scans, MRIs in my life and don't remember whether any were for this.  I've also had a number of EMGs which probably were for this and my balance issue.   Prior therapy history for the same diagnosis, illness or injury: (Patient-Rptd) Yes (Patient-Rptd) Many attempts at therapy.    Prior Level of Function  Transfers: Assistive equipment  Ambulation: Assistive equipment  ADL: Assistive equipment  IADL:     Living Environment  Social support: (Patient-Rptd) With family members   Type of home: (Patient-Rptd) House   Stairs to enter the home: (Patient-Rptd) Yes (Patient-Rptd) 6 Is there a railing: (Patient-Rptd) Yes     Ramp: (Patient-Rptd) No   Stairs inside the home: (Patient-Rptd) Yes (Patient-Rptd) 14 Is there a railing:  (Patient-Rptd) Yes     Help at home: (Patient-Rptd) Home management tasks (cooking, cleaning); Home and Yard maintenance tasks; Assist for driving and community activities  Equipment owned: (Patient-Rptd) Straight Cane; Four-point cane; Walker; Walker with wheels; Grab bars; Raised toilet seat; Bath bench; Lift chair     Employment: (Patient-Rptd) No    Hobbies/Interests: (Patient-Rptd) gardening, sewing, keeping family records    Patient goals for therapy: (Patient-Rptd) Walk straight up. Rise from a chair without the aid of my hands. Go up and down steps.    Pain assessment:      Objective   ADDITIONAL HISTORY:  Right hand dominant  Patient reports symptoms of pain, stiffness/loss of motion, and weakness/loss of strength  Transportation: public transportation and medical transportation  Currently not working  -Pain began in July throughout the whole body, began in knees.  -Pain along volar wrists, thumbs, CMC joints, especially at nighttime  -Had COVID in August, hand pain was worsening since COVID    Functional Outcome Measure:   Upper Extremity Functional Index Score:  SCORE:   Column Totals: /80: (Patient-Rptd) 24   (A lower score indicates greater disability.)    Pain Level (Scale 0-10)   11/12/2024   At Rest 0/10   With Use Intense pain but for a short time,   5/10 dull pain     Pain Description  Date 11/12/2024   Location wrist, hand, and thumb and PIP joints   Pain Quality Aching   Frequency intermittent     Pain is worst  nighttime   Exacerbated by  Bumping digits against a hard surface   Relieved by none   Progression Began in July, worsened over time     Flexion Lag  Tips of fingernails to distal pope crease as measured by Digit o Meter   Right   Left   Date 11/12/2024 11/12/2024   DPC to index (cm) 1 2   DPC to long (cm) 2 3   DPC to ring (cm) 2 3   DPC to small (cm) 1 2   Note -Visual observation, measuring above lateral palm  -Strain on dorsum of PIP joints     ROM  Thumb 11/12/2024 11/12/2024    AROM  (PROM) R L   MP /43 /46   IP /53 /49   RABD 39 46   PABD 39 41     ROM  Pain Report: - none  + mild    ++ moderate    +++ severe   Wrist 11/12/2024 11/12/2024   AROM (PROM) R L   Extension 58 63   Flexion 66 71   RD -5 + 12   UD 34 28     Observation/Appearance   - none  + mild    ++ moderate    +++ severe     11/12/2024   Shoulder deformity present over CMC R: +  L: ++   Edema over the CMC joint R: ++  L: -   Noted collapse of MP into hyperextension during pinch R: +  L: + zig zag   [place] Neck Deformity R: + on LF, SF  L: -   [place] Deformity  R: + RF  L: + IF, LF      Provocative Tests  Pain Report:  - none    + mild    ++ moderate    +++ severe     11/12/2024   Crepitus present R: -  L: -   Finkelstein's R: -  L: -   WHAT Test R: +  L: -       Strength   (Measured in pounds)  Pain Report: - none  + mild    ++ moderate    +++ severe    11/12/2024 11/12/2024   Trials L R   1   26 29 +   Average 26 29 +     Lat Pinch 11/12/2024 11/12/2024   Trials L R   1   5 5 +   Average 5 5 +     3 Pt Pinch 11/12/2024 11/12/2024   Trials L R   1   4 5   Average 4 5     Palpation   Pain Report:  - none    + mild    ++ moderate    +++ severe    11/12/2024   CMC Joint Line R: -  L: -   Thenar [place]min[place] R: -  L: -   Web Space R: -  L: -   1st DC R: -  L: -   Radial Styloid R: -  L: -   PIP joint R: + in RF  L: ++ in LF,  +in RF   [place] wrist - B   DIP joint R: + on RF, SF  L: + on LF       Assessment & Plan   CLINICAL IMPRESSIONS  Medical Diagnosis: Hand arthritis  Bilateral hand pain    Treatment Diagnosis: B hand pain    Impression/Assessment: Pt is a 84 year old female presenting to Occupational Therapy due to sudden onset in July.  The following significant findings have been identified: Impaired activity tolerance, Impaired coordination, Impaired ROM, Impaired strength, and Pain.  These identified deficits interfere with their ability to perform self care tasks, recreational activities, household chores, and meal  planning and preparation as compared to previous level of function.     Clinical Decision Making (Complexity):  Assessment of Occupational Performance: 5 or more Performance Deficits  Occupational Performance Limitations: bathing/showering, toileting, dressing, feeding, functional mobility, hygiene and grooming, health management and maintenance, home establishment and management, meal preparation and cleanup, shopping, sleep, and leisure activities  Clinical Decision Making (Complexity): Low complexity    PLAN OF CARE  Treatment Interventions:  Modalities:  Paraffin and Hot Packs  Therapeutic Exercise:  AROM, AAROM, PROM, Tendon Gliding, Blocking, Reverse Blocking, Extensor Tracking, Isotonics, and Isometrics  Neuromuscular re-education:  Nerve Gliding, Coordination/Dexterity, Proprioceptive Training, and Strain Counter Strain  Manual Techniques:  Coordination/Dexterity, Friction massage, Myofascial release, and Manual edema mobilization  Orthotic Fabrication:  Static, Finger based, and Forearm based  Self Care:  Self Care Tasks and Ergonomic Considerations    Long Term Goals   OT Goal 1  Goal Identifier: ADL - hygiene  Goal Description: Mild difficulty using R hand to brush teeth using a hook fist grasp  Rationale: In order to maximize safety and independence with performance of self-care activities  Goal Progress: Goal initiated  Target Date: 01/07/25      Frequency of Treatment: 1x/biweekly  Duration of Treatment: 8 weeks     Recommended Referrals to Other Professionals:   Education Assessment: Learner/Method: Patient;Demonstration  Education Comments: PTRX printed     Risks and benefits of evaluation/treatment have been explained.   Patient/Family/caregiver agrees with Plan of Care.     Evaluation Time:    OT Eval, Low Complexity Minutes (87086): 30       Signing Clinician: Leti Silva OT        [facility]                                                                                    OUTPATIENT OCCUPATIONAL THERAPY      PLAN OF TREATMENT FOR OUTPATIENT REHABILITATION   Patient's Last Name, First Name, Tricia Jc YOB: 1940   Provider's Name   [facility]   Medical Record No.  [id number]     Onset Date: 10/25/24 (order date) Start of Care Date: 11/12/24     Medical Diagnosis:  Hand arthritis  Bilateral hand pain      OT Treatment Diagnosis:  B hand pain Plan of Treatment  Frequency/Duration:1x/biweekly/8 weeks    Certification date from 11/12/24   To 01/07/25        See note for plan of treatment details and functional goals     Leti Silva OT                         I CERTIFY THE NEED FOR THESE SERVICES FURNISHED UNDER        THIS PLAN OF TREATMENT AND WHILE UNDER MY CARE     (Physician attestation of this document indicates review and certification of the therapy plan).              Referring Provider:  Isidro Romeo    Initial Assessment  See Epic Evaluation- 11/12/24

## 2024-11-12 NOTE — PROGRESS NOTES
"PHYSICAL THERAPY EVALUATION  Type of Visit: Evaluation     Fall Risk Screen:  Fall screen completed by: PT  Have you fallen 2 or more times in the past year?: Yes  Have you fallen and had an injury in the past year?: Yes  Is patient a fall risk?: Yes    Subjective       Presenting condition or subjective complaint: \"(Patient-Rptd) I was referred to strengthen my knees but my whole body is weak.  Plus I have pain in the back of my head and neck and extreme, constant pain in my fingers, hands, wrists.\"  Patient presents to PT with complaints of LE weakness primarily hip and knee pain that occurred with insidious onset since ~July 2024. She is having trouble completing stairs, navigating her house, and transitioning from sit to stand.     Date of onset: 07/01/24    Relevant medical history: (Patient-Rptd) Anemia; Arthritis; Dizziness; Hearing problems; Osteoarthritis; Pain at night or rest; Progressive neurological deficits; Severe dizziness; Significant weakness; Sleep disorder like apnea; Thyroid problems; Vision problems   Dates & types of surgery: (Patient-Rptd) 1975 Laparoscopic tubal sterilization;  1984 Thyroid lobectomy;  1985 Hemorrhoidectomy;  1993 Laporoscopic cholecystectomy;  2004 Sinus;  2016 mass in thumb;  2021 Cataracts;  2024 Piggyback cataract surgery; '24 muscle biopsy;  '24 YAG laser CAP    Prior diagnostic imaging/testing results: (Patient-Rptd) Other (Patient-Rptd) I have had so many x-rays, CT scans, MRIs in my life and don't remember whether any were for this.  I've also had a number of EMGs which probably were for this and my balance issue.   Prior therapy history for the same diagnosis, illness or injury: (Patient-Rptd) Yes (Patient-Rptd) Many attempts at therapy. For various conditions    Living Environment  Social support: (Patient-Rptd) With family members   Type of home: (Patient-Rptd) House   Stairs to enter the home: (Patient-Rptd) Yes (Patient-Rptd) 6 Is there a railing: (Patient-Rptd) " Yes     Ramp: (Patient-Rptd) No   Stairs inside the home: (Patient-Rptd) Yes (Patient-Rptd) 14 Is there a railing: (Patient-Rptd) Yes     Help at home: (Patient-Rptd) Home management tasks (cooking, cleaning); Home and Yard maintenance tasks; Assist for driving and community activities  Equipment owned: (Patient-Rptd) Straight Cane; Four-point cane; Walker; Walker with wheels; Grab bars; Raised toilet seat; Bath bench; Lift chair     Employment: (Patient-Rptd) No    Hobbies/Interests: (Patient-Rptd) gardening, sewing, keeping family records    Patient goals for therapy: (Patient-Rptd) Walk straight up. Rise from a chair without the aid of my hands. Go up and down steps.    Pain assessment: Pain present- B knees and R>L hip; weakness in both legs     Objective   LOWER EXTREMITY EVALUATION  GAIT: using 4WW at home and in the community for stability  BALANCE/PROPRIOCEPTION:  impaired- unable to maintain SLS without UE support  ROM:   (Degrees) Left AROM Left PROM  Right AROM Right PROM   Hip Flexion Seated painful at 110 deg  Painful >96 deg    Hip Extension       Hip Abduction Limited by pain  Pain limited    Hip Adduction       Hip Internal Rotation WFL  Pain limited    Hip External Rotation Pain limited  Pain limited    Knee Flexion 115 with pain and crepitus  112 with pain and crepitus    Knee Extension 10  10      STRENGTH:   Pain: - none + mild ++ moderate +++ severe  Strength Scale: 0-5/5 Left Right   Hip Flexion 3+, ++ (mod) 3+, ++ (mod)   Hip Extension     Hip Abduction 3 3-   Hip Adduction     Hip Internal Rotation     Hip External Rotation     Knee Flexion 3+ 4-   Knee Extension 4- 4-     FUNCTIONAL TESTS:  sit to stand needs UE support to rise from raised chair height  PALPATION:  TTP R hip crease    Assessment & Plan   CLINICAL IMPRESSIONS  Medical Diagnosis:      Treatment Diagnosis: B LE weakness; R>L hip pain; B knee pain   Impression/Assessment: Patient is a 84 year old female with B knee and hip  complaints with B LE weakness demonstrated  The following significant findings have been identified: Pain, Decreased ROM/flexibility, Decreased joint mobility, Decreased strength, Impaired balance, Impaired gait, Impaired muscle performance, and Decreased activity tolerance. These impairments interfere with their ability to perform self care tasks, recreational activities, household chores, household mobility, and community mobility as compared to previous level of function.     Clinical Decision Making (Complexity):  Clinical Presentation: Stable/Uncomplicated  Clinical Presentation Rationale: based on medical and personal factors listed in PT evaluation  Clinical Decision Making (Complexity): Low complexity    PLAN OF CARE  Treatment Interventions:  Interventions: Gait Training, Manual Therapy, Neuromuscular Re-education, Therapeutic Activity, Therapeutic Exercise, Self-Care/Home Management    Long Term Goals     PT Goal 1  Goal Identifier: HEP  Goal Description: Patient will be consistent and independent with HEP in order to achieve functional goals.  Rationale: to maximize safety and independence with performance of ADLs and functional tasks;to maximize safety and independence within the home;to maximize safety and independence within the community;to maximize safety and independence with transportation;to maximize safety and independence with self cares  Target Date: 12/10/24  PT Goal 2  Goal Identifier: Walking  Goal Description: Patient will report decreased hip and knee pain when standing or walking for at least 15 minutes to allow her increased acess to her home/community.  Rationale: to maximize safety and independence with performance of ADLs and functional tasks;to maximize safety and independence within the home;to maximize safety and independence within the community;to maximize safety and independence with transportation;to maximize safety and independence with self cares  Target Date: 01/21/25  PT  Goal 3  Goal Identifier: Sit to Stand  Goal Description: Patient will be able to complete at least 5xSTS without UE support from standard chair to improve functional mobility and decrease fall risk.  Rationale: to maximize safety and independence with transportation;to maximize safety and independence with performance of ADLs and functional tasks;to maximize safety and independence with self cares  Target Date: 01/21/25      Frequency of Treatment: 1x per week  Duration of Treatment: 10 weeks    Recommended Referrals to Other Professionals:   Education Assessment:   Learner/Method: Patient;Pictures/Video  Education Comments: Established POC, discussed CHELO/PHYS of pain, HEP frequency, and activity mod    Risks and benefits of evaluation/treatment have been explained.   Patient/Family/caregiver agrees with Plan of Care.     Evaluation Time:     PT Eval, Low Complexity Minutes (68195): 20    Signing Clinician: Mahnaz Churchill, ROBERT        [facility]                                                                                   OUTPATIENT PHYSICAL THERAPY      PLAN OF TREATMENT FOR OUTPATIENT REHABILITATION   Patient's Last Name, First Name, ELVIS SosaMaandrew PACHECO YOB: 1940   Provider's Name   [facility]   Medical Record No.  [id number]     Onset Date: 07/01/24  Start of Care Date: 11/12/24     Medical Diagnosis:         PT Treatment Diagnosis:  B LE weakness; R>L hip pain; B knee pain Plan of Treatment  Frequency/Duration: 1x per week/ 10 weeks    Certification date from 11/12/24 to 01/21/25         See note for plan of treatment details and functional goals     Mahnaz Churchill, PT                         I CERTIFY THE NEED FOR THESE SERVICES FURNISHED UNDER        THIS PLAN OF TREATMENT AND WHILE UNDER MY CARE     (Physician attestation of this document indicates review and certification of the therapy plan).              Referring  Provider:  Rashid Cardoso    Initial Assessment  See Epic Evaluation- Start of Care Date: 11/12/24

## 2024-11-13 NOTE — TELEPHONE ENCOUNTER
Per Dr. Yeo - new PT order placed with additional dx. No further action needed.    Trinidad Torres, ATC

## 2024-11-15 ENCOUNTER — VIRTUAL VISIT (OUTPATIENT)
Dept: ONCOLOGY | Facility: CLINIC | Age: 84
End: 2024-11-15
Attending: STUDENT IN AN ORGANIZED HEALTH CARE EDUCATION/TRAINING PROGRAM
Payer: MEDICARE

## 2024-11-15 DIAGNOSIS — D59.10 AUTOIMMUNE HEMOLYTIC ANEMIA (H): ICD-10-CM

## 2024-11-15 DIAGNOSIS — R53.83 OTHER FATIGUE: ICD-10-CM

## 2024-11-15 DIAGNOSIS — M62.81 MUSCLE WEAKNESS (GENERALIZED): ICD-10-CM

## 2024-11-15 DIAGNOSIS — R26.89 BALANCE PROBLEMS: ICD-10-CM

## 2024-11-15 DIAGNOSIS — D83.9 CVID (COMMON VARIABLE IMMUNODEFICIENCY) (H): Primary | ICD-10-CM

## 2024-11-15 NOTE — PROGRESS NOTES
[facility]   PM&R clinic note        Interval history:     Tricia Sosa presents to clinic today for follow up reg her rehab needs.   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   Was last seen in clinic on 5/14/24.  Recommendations included:  Therapy/equipment/braces:  Neuro PT referral placed for gait, balance and strength.  Restart home exercise regimen as tolerated.  Continue protein supplementation daily, aiming for an extra 30 g of protein daily.  Follow up: 5 to 6 months.        Symptoms,  Tricia was seen for a return visit today.  She is worse than when she last saw us in clinic. After her last visit, Neurology could not come up with a definitive diagnosis for her balance difficulties. She had a spinal tap which did not reveal anything.  In the last week of July, she suddenly couldn't get up the steps and couldn't get up from a chair using her hands.  She was having trouble with her knees at the same time and saw her Orthopedic physician who did CS injections. Subsequently she developed joint pain in her hands and hips/groin and could not ambulate steps up so half steps had to be added.  For the toilet, she had to get a raised toilet to help with her weakness.  She then had another appt with Orthopedic doctor and could not consider surgical options. The groin are was really bothering her when she sleeps.  After some time she was subsequently referred to Rheumatology who did tests and ruled out other rheumatologic conditions including RA.  She is starting therapy again through her orthopedic clinic for her hips and knees.  Her hand/upper extremity strength has gradually improved over time. She has not had any recent falls.  The last fall was June 10th. Missed a step and fell on her side. A recumbent bike was recommended by her physical therapist.    Therapies/HEP,  Participating in outpatient therapy.      Functionally,   Decreased  strength, especially with uppers > lower. Uses a walker for ambulation.       Social history is unchanged.      Medications:  Current Outpatient Medications   Medication Sig Dispense Refill    acetaminophen (TYLENOL) 500 MG tablet Take 1,000 mg by mouth every 6 hours as needed for mild pain.      baclofen (LIORESAL) 10 MG tablet Take 10 mg by mouth 3 times daily.      celecoxib (CELEBREX) 100 MG capsule Take 1-2 capsules (100-200 mg) by mouth 2 times daily. 90 capsule 1    cyanocobalamin (VITAMIN  B-12) 1000 MCG tablet   3    folic acid (FOLVITE) 1 MG tablet   3    hydroCHLOROthiazide 12.5 MG tablet TAKE 1 TABLET AS NEEDED FOR EDEMA 30 tablet 2    ketoconazole (NIZORAL) 2 % external shampoo Use every other day to scalp as needed 120 mL 0    levothyroxine (SYNTHROID/LEVOTHROID) 150 MCG tablet Take 1 tablet (150 mcg) by mouth daily 90 tablet 3    omega 3 1000 MG CAPS Take 1,280 mg by mouth daily      predniSONE (DELTASONE) 5 MG tablet Take 1 tablet (5 mg) by mouth 2 times daily. 60 tablet 1    triamcinolone (KENALOG) 0.1 % external ointment Apply topically 2 times daily                Physical Exam:   LMP  (LMP Unknown)   Gen: NAD, pleasant and cooperative   HEENT: Atraumatic, normocephalic, extraocular movements appear intact  Pulm: non-labored breathing in room air  Neuro/MSK:   Orientation: Oriented to person, time, place and situation, Exhibits good insight into her condition and ongoing treatment  Motor: Observed moving upper extremities actively against gravity    Labs/Imaging:  Lab Results   Component Value Date    WBC 6.9 11/01/2024    HGB 10.9 (L) 11/01/2024    HCT 32.9 (L) 11/01/2024    MCV 99 11/01/2024     11/01/2024     Lab Results   Component Value Date     07/22/2024    POTASSIUM 4.1 07/22/2024    CHLORIDE 103 07/22/2024    CO2 22 07/22/2024     (H) 07/22/2024     Lab Results   Component Value Date    GFRESTIMATED 90 07/22/2024    GFRESTBLACK >90 06/29/2021     Lab Results  "  Component Value Date    AST 28 07/22/2024    ALT 12 07/22/2024    ALKPHOS 118 07/22/2024    BILITOTAL 0.5 07/22/2024    BILIDIRECT 0.30 05/19/2000     No results found for: \"INR\"  Lab Results   Component Value Date    BUN 14.6 07/22/2024    CR 0.56 07/22/2024              Assessment/Plan   Tricia Sosa presents to clinic today for follow up reg her rehab needs.   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   Was last seen in clinic on 5/14/24.  She has had a significant decline in function including significantly worsened upper extremity weakness mainly proximal but also distal that has occurred since her last visit causing a decline in function, now needing a walker for ambulation and transfers and more unsteady on her feet.  Neurology and rheumatology workup has been extensive and negative for potential diagnoses.  She continues to follow with orthopedic surgery for arthritis and is seeing physical therapy.  We we will order a repeat EMG as it has been a few years since she had her last 1 to assess for any radicular or muscle contributing etiologies to her decline.  She is agreeable to this.  We will plan for a 3-month return virtual visit.  She is in agreement with this plan.      Therapy/equipment/braces:  Continue PT for gait, balance and strength.  Continue home exercise regimen as tolerated.  Continue protein supplementation daily, aiming for an extra 30 g of protein daily.  EMG referral placed for 3 limb EMG to assess for neuromuscular causes for sudden decline and weakness.  Follow up: 3 months.      Marley Heart MD  Physical Medicine & Rehabilitation      50 minutes spent on the date of the encounter doing chart review, history and exam, documentation and further activities as noted above.           "

## 2024-11-15 NOTE — PATIENT INSTRUCTIONS
It was nice to see you again today.    1. An EMG referral was placed to further evaluate your muscle weakness.  2. Continue physical therapy and your home exercise regimen.  3. Follow up with Dr. Heart in 3 months for a return virtual visit.

## 2024-11-15 NOTE — NURSING NOTE
Current patient location: [id number]  [place] [id number]    Is the patient currently in the state of [place]? YES    Visit mode:VIDEO    If the visit is dropped, the patient can be reconnected by:VIDEO VISIT: Send to e-mail at: [email or web address]    Will anyone else be joining the visit? NO  (If patient encounters technical issues they should call [phone])    Are changes needed to the allergy or medication list? Pt stated no changes to allergies and Pt stated no med changes    Are refills needed on medications prescribed by this physician? NO    Rooming Documentation:  Questionnaire(s) completed    Reason for visit: RECHECK    Elinor LONG

## 2024-11-15 NOTE — LETTER
11/15/2024      Tricia Sosa  [id number]  [facility] [address]      Dear Colleague,    Thank you for referring your patient, Tricia Sosa, to the [facility]. Please see a copy of my visit note below.    Virtual Visit Details    Type of service:  Video Visit     Originating Location (pt. Location): Home    Distant Location (provider location):  On-site  Platform used for Video Visit: [facility]   PM&R clinic note        Interval history:     Tricia Sosa presents to clinic today for follow up reg her rehab needs.   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   Was last seen in clinic on 5/14/24.  Recommendations included:  Therapy/equipment/braces:  Neuro PT referral placed for gait, balance and strength.  Restart home exercise regimen as tolerated.  Continue protein supplementation daily, aiming for an extra 30 g of protein daily.  Follow up: 5 to 6 months.        Symptoms,  Tricia was seen for a return visit today.  She is worse than when she last saw us in clinic. After her last visit, Neurology could not come up with a definitive diagnosis for her balance difficulties. She had a spinal tap which did not reveal anything.  In the last week of July, she suddenly couldn't get up the steps and couldn't get up from a chair using her hands.  She was having trouble with her knees at the same time and saw her Orthopedic physician who did CS injections. Subsequently she developed joint pain in her hands and hips/groin and could not ambulate steps up so half steps had to be added.  For the toilet, she had to get a raised toilet to help with her weakness.  She then had another appt with Orthopedic doctor and could not consider surgical options. The groin are was really bothering her when she sleeps.  After some time she was subsequently referred to Rheumatology who did tests and ruled out  other rheumatologic conditions including RA.  She is starting therapy again through her orthopedic clinic for her hips and knees.  Her hand/upper extremity strength has gradually improved over time. She has not had any recent falls.  The last fall was June 10th. Missed a step and fell on her side. A recumbent bike was recommended by her physical therapist.    Therapies/HEP,  Participating in outpatient therapy.      Functionally,   Decreased strength, especially with uppers > lower. Uses a walker for ambulation.       Social history is unchanged.      Medications:  Current Outpatient Medications   Medication Sig Dispense Refill     acetaminophen (TYLENOL) 500 MG tablet Take 1,000 mg by mouth every 6 hours as needed for mild pain.       baclofen (LIORESAL) 10 MG tablet Take 10 mg by mouth 3 times daily.       celecoxib (CELEBREX) 100 MG capsule Take 1-2 capsules (100-200 mg) by mouth 2 times daily. 90 capsule 1     cyanocobalamin (VITAMIN  B-12) 1000 MCG tablet   3     folic acid (FOLVITE) 1 MG tablet   3     hydroCHLOROthiazide 12.5 MG tablet TAKE 1 TABLET AS NEEDED FOR EDEMA 30 tablet 2     ketoconazole (NIZORAL) 2 % external shampoo Use every other day to scalp as needed 120 mL 0     levothyroxine (SYNTHROID/LEVOTHROID) 150 MCG tablet Take 1 tablet (150 mcg) by mouth daily 90 tablet 3     omega 3 1000 MG CAPS Take 1,280 mg by mouth daily       predniSONE (DELTASONE) 5 MG tablet Take 1 tablet (5 mg) by mouth 2 times daily. 60 tablet 1     triamcinolone (KENALOG) 0.1 % external ointment Apply topically 2 times daily                Physical Exam:   LMP  (LMP Unknown)   Gen: NAD, pleasant and cooperative   HEENT: Atraumatic, normocephalic, extraocular movements appear intact  Pulm: non-labored breathing in room air  Neuro/MSK:   Orientation: Oriented to person, time, place and situation, Exhibits good insight into her condition and ongoing treatment  Motor: Observed moving upper extremities actively against  "gravity    Labs/Imaging:  Lab Results   Component Value Date    WBC 6.9 11/01/2024    HGB 10.9 (L) 11/01/2024    HCT 32.9 (L) 11/01/2024    MCV 99 11/01/2024     11/01/2024     Lab Results   Component Value Date     07/22/2024    POTASSIUM 4.1 07/22/2024    CHLORIDE 103 07/22/2024    CO2 22 07/22/2024     (H) 07/22/2024     Lab Results   Component Value Date    GFRESTIMATED 90 07/22/2024    GFRESTBLACK >90 06/29/2021     Lab Results   Component Value Date    AST 28 07/22/2024    ALT 12 07/22/2024    ALKPHOS 118 07/22/2024    BILITOTAL 0.5 07/22/2024    BILIDIRECT 0.30 05/19/2000     No results found for: \"INR\"  Lab Results   Component Value Date    BUN 14.6 07/22/2024    CR 0.56 07/22/2024              Assessment/Plan   Tircia Sosa presents to clinic today for follow up reg her rehab needs.   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   Was last seen in clinic on 5/14/24.  She has had a significant decline in function including significantly worsened upper extremity weakness mainly proximal but also distal that has occurred since her last visit causing a decline in function, now needing a walker for ambulation and transfers and more unsteady on her feet.  Neurology and rheumatology workup has been extensive and negative for potential diagnoses.  She continues to follow with orthopedic surgery for arthritis and is seeing physical therapy.  We we will order a repeat EMG as it has been a few years since she had her last 1 to assess for any radicular or muscle contributing etiologies to her decline.  She is agreeable to this.  We will plan for a 3-month return virtual visit.  She is in agreement with this plan.      Therapy/equipment/braces:  Continue PT for gait, balance and strength.  Continue home exercise regimen as tolerated.  Continue protein supplementation daily, aiming for an extra 30 g of protein daily.  EMG referral placed for 3 limb EMG to " assess for neuromuscular causes for sudden decline and weakness.  Follow up: 3 months.      Marley Heart MD  Physical Medicine & Rehabilitation      50 minutes spent on the date of the encounter doing chart review, history and exam, documentation and further activities as noted above.               Again, thank you for allowing me to participate in the care of your patient.        Sincerely,        Marley Heart MD

## 2024-11-26 ENCOUNTER — THERAPY VISIT (OUTPATIENT)
Dept: PHYSICAL THERAPY | Facility: CLINIC | Age: 84
End: 2024-11-26
Payer: MEDICARE

## 2024-11-26 DIAGNOSIS — M17.0 OSTEOARTHRITIS OF PATELLOFEMORAL JOINTS OF BOTH KNEES: ICD-10-CM

## 2024-11-26 DIAGNOSIS — M16.11 PRIMARY OSTEOARTHRITIS OF RIGHT HIP: ICD-10-CM

## 2024-11-26 PROCEDURE — 97530 THERAPEUTIC ACTIVITIES: CPT | Mod: GP | Performed by: PHYSICAL THERAPIST

## 2024-11-26 PROCEDURE — 97110 THERAPEUTIC EXERCISES: CPT | Mod: GP | Performed by: PHYSICAL THERAPIST

## 2024-12-03 ENCOUNTER — THERAPY VISIT (OUTPATIENT)
Dept: OCCUPATIONAL THERAPY | Facility: CLINIC | Age: 84
End: 2024-12-03
Payer: MEDICARE

## 2024-12-03 ENCOUNTER — THERAPY VISIT (OUTPATIENT)
Dept: PHYSICAL THERAPY | Facility: CLINIC | Age: 84
End: 2024-12-03
Payer: MEDICARE

## 2024-12-03 DIAGNOSIS — M79.641 BILATERAL HAND PAIN: ICD-10-CM

## 2024-12-03 DIAGNOSIS — M19.049 HAND ARTHRITIS: Primary | ICD-10-CM

## 2024-12-03 DIAGNOSIS — M79.642 BILATERAL HAND PAIN: ICD-10-CM

## 2024-12-03 DIAGNOSIS — M17.0 OSTEOARTHRITIS OF PATELLOFEMORAL JOINTS OF BOTH KNEES: ICD-10-CM

## 2024-12-03 DIAGNOSIS — M16.11 PRIMARY OSTEOARTHRITIS OF RIGHT HIP: Primary | ICD-10-CM

## 2024-12-03 PROCEDURE — 97110 THERAPEUTIC EXERCISES: CPT | Mod: GP | Performed by: PHYSICAL THERAPIST

## 2024-12-03 PROCEDURE — 97763 ORTHC/PROSTC MGMT SBSQ ENC: CPT | Mod: GO | Performed by: OCCUPATIONAL THERAPIST

## 2024-12-03 PROCEDURE — 97110 THERAPEUTIC EXERCISES: CPT | Mod: GO | Performed by: OCCUPATIONAL THERAPIST

## 2024-12-05 ENCOUNTER — OFFICE VISIT (OUTPATIENT)
Dept: RHEUMATOLOGY | Facility: CLINIC | Age: 84
End: 2024-12-05
Payer: MEDICARE

## 2024-12-05 VITALS
BODY MASS INDEX: 20.19 KG/M2 | OXYGEN SATURATION: 98 % | SYSTOLIC BLOOD PRESSURE: 146 MMHG | DIASTOLIC BLOOD PRESSURE: 63 MMHG | WEIGHT: 141 LBS | HEIGHT: 70 IN | HEART RATE: 80 BPM

## 2024-12-05 DIAGNOSIS — M79.642 BILATERAL HAND PAIN: Primary | ICD-10-CM

## 2024-12-05 DIAGNOSIS — Z71.89 ENCOUNTER TO DISCUSS TREATMENT OPTIONS: ICD-10-CM

## 2024-12-05 DIAGNOSIS — M79.641 BILATERAL HAND PAIN: Primary | ICD-10-CM

## 2024-12-05 DIAGNOSIS — M19.049 HAND ARTHRITIS: ICD-10-CM

## 2024-12-05 DIAGNOSIS — Z86.2 HISTORY OF AUTOIMMUNE HEMOLYTIC ANEMIA: ICD-10-CM

## 2024-12-05 NOTE — PROGRESS NOTES
Assessment and Plan      Tricia was seen for swelling in her fingers, wrists, knees last month, her rheumatoid serologies RF, CCP were negative, uric acid normal.  She does have osteoarthritis, especially moderate osteoarthritis in the right hip joint.  She gets Rituxan infusions for autoimmune hemolytic anemia, was also started on IVIG.  We started her on 10 mg prednisone and she is much more comfortable.    We discussed weaning prednisone and I gave her written instructions to reduce prednisone (7.5 mg daily till mid December, 5 mg daily December 16-31), 2.5 mg daily January 1-15 and if her inflammatory arthritis does not return after that she can discontinue prednisone.    If her inflammatory arthritis returns as she is weaning her prednisone dose then she could have seronegative RA in which case we would add methotrexate as a steroid sparing agent.  Methotrexate discussed including its side effects, need for blood work monitoring (LFTs, CBC, creatinine).    Follow-up 6 weeks          Rheumatology follow-up visit note         HPI    Tricia is a very pleasant 84-year-old lady with a history of autoimmune hemolytic anemia for which she has been getting Rituxan infusions since 2019.  She follows with hematology and is getting rituximab infusions every 2 months, IVIG was also added recently.  She has a history of osteoarthritis involving her spine, was told she had osteoarthritis in the knee joints.  She was relatively comfortable and functional until July 2020 when she started noticing acute worsening and swelling of her knee pain.  This was followed by pain in her fingers particularly the PIP and DIP knuckles, she noticed some neck stiffness and her hips were also quite uncomfortable especially the right hip.  The worsening pain in her hands, hips was a significant change for her and she was quite uncomfortable.  She was seen by orthopedics, they injected her knee joints which relieved her knee pain to some extent.   She was also told she has osteoarthritis in the knee joints.  She denies diurnal variation in her joint symptoms but has difficulty sleeping at night because of the pain in her hands and hips.  She denies any problems with her shoulders, ankles or toes.  She tried diclofenac gel for her hand pain but it was ineffective.      Hand x-rays reviewed, the MCP joints are normal without any erosions, DJD noted in the PIP and DIP knuckles with gullwing changes in the right little finger DIP knuckle.    She denies any history of family history of psoriasis, rheumatoid arthritis or lupus.    She is , retired (post ), has a college education, lives with her son and his family in [place].  She does not smoke, no alcohol.    Past medical history notable for autoimmune hemolytic anemia, hypothyroidism.  She has also been on steroids in the past for the hemolytic anemia.            DETAILED EXAMINATION  12/05/24  :    Pleasant elderly lady, alert and oriented x 3, uses a walker to get by.  In moderate discomfort this afternoon (hands, hips, knees)     Head/neck.  No rash, no jaundice, no ocular redness     Lungs clear, no crackles or wheezing     Heart sounds regular     Abdomen soft, nontender     Musculoskeletal: Note: Patient on 10 mg prednisone which can potentially mask joint synovitis and swelling     Hands.  There is no swelling or tenderness of the MCP, PIP or DIP knuckles today,  5/5 bilaterally     Elbows full range of motion, no synovitis or redness     Shoulders normal abduction and external rotation bilaterally     Cervical spine lateral rotation restricted bilaterally     Spine no paraspinal tenderness     Right hip.  Flexion and internal rotation is painful and restricted in the right hip.     Left hip full range of motion including flexion and internal rotation without discomfort.     Knees.  Small effusions, no redness or warmth.     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     Priority: Medium    Mild obstructive sleep apnea 11/24/2019     Priority: Medium    DDD (degenerative disc disease), lumbar 08/05/2019     Priority: Medium    Right-sided low back pain with right-sided sciatica, unspecified chronicity 11/16/2017     Priority: Medium    Other autoimmune hemolytic anemia (H) 09/21/2017     Priority: Medium     IMO Regulatory Load OCT 2020      Diarrhea 07/01/2016     Priority: Medium    Other amnesia 02/29/2016     Priority: Medium    Ataxia, unspecified 01/27/2016     Priority: Medium    Other acquired deformities of left foot 01/27/2016     Priority: Medium    Other acquired deformities of right foot 01/27/2016     Priority: Medium    Other disturbances of skin sensation 01/27/2016     Priority: Medium    B12 deficiency 08/03/2015     Priority: Medium     Started on B12 injections 8/3/15.      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 Estevez.        Bronchiectasis (H) 04/16/2009     Priority: Medium    Lymphocytic colitis 09/27/2001     Priority: Medium     Problem list name updated by automated process. Provider to review      Acquired hypothyroidism 09/27/2001     Priority: Medium     Past Surgical History:   Procedure Laterality Date    BIOPSY MUSCLE DIAGNOSTIC (LOCATION) Right 1/16/2024    Procedure: BIOPSY, MUSCLE - RIGHT THIGH;  Surgeon: Dickson Madrid MD;  Location: [facility] OR    COLONOSCOPY N/A 4/26/2016    Procedure:  COLONOSCOPY;  Surgeon: Dontae Del Rio MD;  Location:  GI    COLONOSCOPY N/A 2/22/2024    Procedure: Colonoscopy with polypectomy by using cold snare and two hemoclips applied;  Surgeon: Dontae Del Rio MD;  Location:  GI    ENT SURGERY  1985    Thyroid lobectomy    ESOPHAGOSCOPY, GASTROSCOPY, DUODENOSCOPY (EGD), COMBINED N/A 2/22/2024    Procedure: ESOPHAGOGASTRODUODENOSCOPY, WITH BIOPSY by using cold forcep;  Surgeon: Dontae Del Rio MD;  Location:  GI    EYE SURGERY Bilateral 04/20/2021    Cataract Surgery    HC CYSTOSCOPY,INSERT URETERAL STENT      Ureteral stent insertion    HC FLEX SIGMOIDOSCOPY W BIOPSY  5/30/00    HC LAPAROSCOPY, SURGICAL; CHOLECYSTECTOMY  1992     THYROIDECTOMY      LIGATION OF HEMORRHOID(S)      Hemorrhoidectomy    UPPER GI ENDOSCOPY,BIOPSY  10/01/01    ZZC LIGATE FALLOPIAN TUBE      ZZHC COLONOSCOPY W BIOPSY  4/26/00    ZZHC COLONOSCOPY W BIOPSY  10/8/03    ZZHC COLONOSCOPY W BIOPSY  6/27/05    REPEAT IN 5 YEARS      Past Medical History:   Diagnosis Date    Arthritis     WARD (dyspnea on exertion)     External hemorrhoids without mention of complication 06/27/2005    Hemolytic anemia (H)     autoimmune    Hypothyroidism     IgA deficiency (H)     Myopia of both eyes with astigmatism and presbyopia 08/16/2017    Other malaise and fatigue     Other severe protein-calorie malnutrition     Other vitreous opacities 12/19/2006    Sleep apnea     Thyroid disease     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    Hemorrhage Subdural Trauma Without LOC Active    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, and posterior vitreous detachment    Urinary tract infection, site not specified     Recurrent UTI's     Allergies   Allergen Reactions    Doxycycline      Current Outpatient Medications   Medication Sig Dispense Refill    cyanocobalamin (VITAMIN  B-12) 1000 MCG tablet   3    folic acid (FOLVITE) 1 MG tablet   3    hydroCHLOROthiazide  12.5 MG tablet TAKE 1 TABLET AS NEEDED FOR EDEMA 30 tablet 2    ketoconazole (NIZORAL) 2 % external shampoo Use every other day to scalp as needed 120 mL 0    levothyroxine (SYNTHROID/LEVOTHROID) 150 MCG tablet Take 1 tablet (150 mcg) by mouth daily 90 tablet 3    omega 3 1000 MG CAPS Take 1,280 mg by mouth daily      predniSONE (DELTASONE) 5 MG tablet Take 1 tablet (5 mg) by mouth 2 times daily. 60 tablet 1    triamcinolone (KENALOG) 0.1 % external ointment Apply topically 2 times daily      acetaminophen (TYLENOL) 500 MG tablet Take 1,000 mg by mouth every 6 hours as needed for mild pain.      baclofen (LIORESAL) 10 MG tablet Take 10 mg by mouth 3 times daily.      celecoxib (CELEBREX) 100 MG capsule Take 1-2 capsules (100-200 mg) by mouth 2 times daily. 90 capsule 1     family history includes Anxiety Disorder in her sister; Breast Cancer in her sister and sister; Cerebrovascular Disease in her father; Colon Cancer in her niece; Colon Cancer (age of onset: 90) in her mother; Dementia in her brother and brother; Depression in her sister; Hyperlipidemia in her sister; Lung Cancer in her sister; Other Cancer in her niece and sister; Pancreatic Cancer in her brother; Prostate Cancer in her brother; Thyroid Disease in her brother, brother, and sister.  Social Connections: Not on file          WBC   Date Value Ref Range Status   07/06/2021 7.6 4.0 - 11.0 10e9/L Final     WBC Count   Date Value Ref Range Status   11/01/2024 6.9 4.0 - 11.0 10e3/uL Final     RBC Count   Date Value Ref Range Status   11/01/2024 3.31 (L) 3.80 - 5.20 10e6/uL Final   07/06/2021 3.42 (L) 3.8 - 5.2 10e12/L Final     Hemoglobin   Date Value Ref Range Status   11/01/2024 10.9 (L) 11.7 - 15.7 g/dL Final   07/06/2021 10.1 (L) 11.7 - 15.7 g/dL Final     Hematocrit   Date Value Ref Range Status   11/01/2024 32.9 (L) 35.0 - 47.0 % Final   07/06/2021 32.2 (L) 35.0 - 47.0 % Final     MCV   Date Value Ref Range Status   11/01/2024 99 78 - 100 fL Final    07/06/2021 94 78 - 100 fl Final     MCH   Date Value Ref Range Status   11/01/2024 32.9 26.5 - 33.0 pg Final   07/06/2021 29.5 26.5 - 33.0 pg Final     Platelet Count   Date Value Ref Range Status   11/01/2024 228 150 - 450 10e3/uL Final   07/06/2021 130 (L) 150 - 450 10e9/L Final     % Lymphocytes   Date Value Ref Range Status   11/01/2024 47 % Final   07/06/2021 6.2 % Final     AST   Date Value Ref Range Status   07/22/2024 28 0 - 45 U/L Final   06/29/2021 30 0 - 45 U/L Final     ALT   Date Value Ref Range Status   07/22/2024 12 0 - 50 U/L Final   06/29/2021 36 0 - 50 U/L Final     Albumin   Date Value Ref Range Status   07/22/2024 4.2 3.5 - 5.2 g/dL Final   09/16/2022 4.2 3.4 - 5.0 g/dL Final   06/29/2021 3.9 3.4 - 5.0 g/dL Final     Alkaline Phosphatase   Date Value Ref Range Status   07/22/2024 118 40 - 150 U/L Final   06/29/2021 97 40 - 150 U/L Final     Creatinine   Date Value Ref Range Status   07/22/2024 0.56 0.51 - 0.95 mg/dL Final   06/29/2021 0.65 0.52 - 1.04 mg/dL Final     GFR Estimate   Date Value Ref Range Status   07/22/2024 90 >60 mL/min/1.73m2 Final     Comment:     eGFR calculated using 2021 CKD-EPI equation.   06/29/2021 84 >60 mL/min/[1.73_m2] Final     Comment:     Non  GFR Calc  Starting 12/18/2018, serum creatinine based estimated GFR (eGFR) will be   calculated using the Chronic Kidney Disease Epidemiology Collaboration   (CKD-EPI) equation.       GFR Estimate If Black   Date Value Ref Range Status   06/29/2021 >90 >60 mL/min/[1.73_m2] Final     Comment:      GFR Calc  Starting 12/18/2018, serum creatinine based estimated GFR (eGFR) will be   calculated using the Chronic Kidney Disease Epidemiology Collaboration   (CKD-EPI) equation.       Sed Rate   Date Value Ref Range Status   07/29/2020 119 (H) 0 - 30 mm/h Final     Erythrocyte Sedimentation Rate   Date Value Ref Range Status   04/15/2024 18 0 - 30 mm/hr Final     CRP Inflammation   Date Value Ref Range  Status   04/14/2015 <2.9 0.0 - 8.0 mg/L Final

## 2024-12-10 ENCOUNTER — THERAPY VISIT (OUTPATIENT)
Dept: OCCUPATIONAL THERAPY | Facility: CLINIC | Age: 84
End: 2024-12-10
Payer: MEDICARE

## 2024-12-10 ENCOUNTER — THERAPY VISIT (OUTPATIENT)
Dept: PHYSICAL THERAPY | Facility: CLINIC | Age: 84
End: 2024-12-10
Payer: MEDICARE

## 2024-12-10 DIAGNOSIS — M79.641 BILATERAL HAND PAIN: ICD-10-CM

## 2024-12-10 DIAGNOSIS — M16.11 PRIMARY OSTEOARTHRITIS OF RIGHT HIP: Primary | ICD-10-CM

## 2024-12-10 DIAGNOSIS — M79.642 BILATERAL HAND PAIN: ICD-10-CM

## 2024-12-10 DIAGNOSIS — M17.0 OSTEOARTHRITIS OF PATELLOFEMORAL JOINTS OF BOTH KNEES: ICD-10-CM

## 2024-12-10 DIAGNOSIS — M19.049 HAND ARTHRITIS: Primary | ICD-10-CM

## 2024-12-10 PROCEDURE — 97112 NEUROMUSCULAR REEDUCATION: CPT | Mod: GP | Performed by: PHYSICAL THERAPIST

## 2024-12-10 PROCEDURE — 97140 MANUAL THERAPY 1/> REGIONS: CPT | Mod: GO | Performed by: OCCUPATIONAL THERAPIST

## 2024-12-10 PROCEDURE — 97110 THERAPEUTIC EXERCISES: CPT | Mod: GP | Performed by: PHYSICAL THERAPIST

## 2024-12-16 ENCOUNTER — MYC MEDICAL ADVICE (OUTPATIENT)
Dept: RHEUMATOLOGY | Facility: CLINIC | Age: 84
End: 2024-12-16
Payer: MEDICARE

## 2024-12-16 DIAGNOSIS — M19.049 HAND ARTHRITIS: Primary | ICD-10-CM

## 2024-12-16 RX ORDER — METHOTREXATE 2.5 MG/1
10 TABLET ORAL
Qty: 16 TABLET | Refills: 0 | Status: SHIPPED | OUTPATIENT
Start: 2024-12-16

## 2024-12-16 RX ORDER — FOLIC ACID 1 MG/1
1 TABLET ORAL DAILY
Qty: 30 TABLET | Refills: 2 | Status: CANCELLED | OUTPATIENT
Start: 2024-12-16

## 2024-12-16 NOTE — TELEPHONE ENCOUNTER
Please send a prescription for methotrexate 10 mg weekly, with folic acid 1 mg daily (1 month supply).  I should see her in a month, she should have methotrexate labs (ALT, creatinine, CBC) prior to next visit, please inform the patient that methotrexate will take a couple of months to take effect.

## 2024-12-17 ENCOUNTER — THERAPY VISIT (OUTPATIENT)
Dept: PHYSICAL THERAPY | Facility: CLINIC | Age: 84
End: 2024-12-17
Payer: MEDICARE

## 2024-12-17 DIAGNOSIS — M16.11 PRIMARY OSTEOARTHRITIS OF RIGHT HIP: Primary | ICD-10-CM

## 2024-12-17 DIAGNOSIS — M17.0 OSTEOARTHRITIS OF PATELLOFEMORAL JOINTS OF BOTH KNEES: ICD-10-CM

## 2024-12-17 PROCEDURE — 97110 THERAPEUTIC EXERCISES: CPT | Mod: GP | Performed by: PHYSICAL THERAPIST

## 2024-12-18 NOTE — TELEPHONE ENCOUNTER
LOV 12/5/24  Methotrexate prescribed on 12/16/24    OV notes:  We discussed weaning prednisone and I gave her written instructions to reduce prednisone (7.5 mg daily till mid December, 5 mg daily December 16-31), 2.5 mg daily January 1-15 and if her inflammatory arthritis does not return after that she can discontinue prednisone.

## 2024-12-20 NOTE — PROGRESS NOTES
Oncology/Hematology Visit Note  Dec 26, 2024    Reason for visit: Follow up of CVID, autoimmune hemolytic anemia, iron deficiency       Oncology HPI: Tricia Sosa is a 83 year old female who presents with autoimmune hemolytic anemia and IgA deficiency. She was previously seen at [facility].     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  She was very sick at the time and had severe diarrhea.  She lost about 40 pounds in weight.  The workup revealed that she had markedly diminished CD4 counts of 48 and was diagnosed with CMV colitis.  Since then she has been followed in hematology clinic at [facility] until recently.  She was noted to have anemia which was fairly long-standing.  She was initially referred for anemia in 2004 and also had a bone marrow biopsy done at that time which revealed hypercellular bone marrow with 70-80% cellularity and normal trilineage hematopoiesis and no morphologic features of MDS or lymphoproliferation.  Her anemia was attributed to her chronic underlying disease.  At the next evaluation in 2002 on 4 aggressive anemia there was no evidence of hemolysis, iron deficiency, B12 or folate deficiency.  Bone marrow biopsy was not pursued.  In summer of 2015 she had progressive fatigue, dyspnea on exertion and worsening anemia with a hemoglobin now of 9.  Workup at this time did suggest a hemolytic anemia with elevated LDH at 709, undetectable haptoglobin and elevated unconjugated bilirubin at 3.3.  Monospecific MARIKA was positive for both IgG and complement.  Cold agglutinin screen was positive with very low titer 1:64.  She was started on prednisone 60 mg daily with supplementation of iron folate and B12 starting 7/31/15.  Her prednisone has been slowly tapered and was discontinued off in January 2016.  She was last followed at [facility] on March 10, 2016 when she had stable hemoglobin.     She was followed by Dr. Matos and Dr. Summers.  Due to relapse of  hemolytic anemia, she underwent another course of prednisone that was tapered off and rituximab x 4 weekly doses completed in 9/19/19.     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  She started weekly Rituxan on 7/31/20 x 4 doses, completed on 8/21/20.  She tapered off of prednisone in October 2020.     Due to relapse of her AIHA, she started prednisone again on 6/8/21 at 60 mg daily with slow taper and finished taper in August 2021.     Due to relapse of AIHA again, she started prednisone again on 11/2/21 at 70 mg daily with slow taper.  She also completed weekly Rituxan again x 4 doses 12/6/21-12/29/21.     Venofer 5/5-5/19/22.     She is now on Rituxan every 2 months and IVIG every 8 weeks if IgG <600.    Interval History:  Tricia is here unaccompanied. She is doing OK. Biggest issue right now is ongoing arthritis pain which she is working on with rheumatology. She is tapering prednisone which has led to increased pain. She did start methotrexate. She denies any recent severe infections. Tolerating Rituxan and IVIG well.     Review of Systems:  Patient denies fevers, chills, night sweats, unexplained weight changes, headaches, dizziness, vision or hearing changes, new lumps or bumps, chest pain, shortness of breath, cough, abdominal pain, nausea, vomiting, changes to bowel or bladder, swelling of extremities, bleeding issues, or rash.    Current Outpatient Medications   Medication Sig Dispense Refill    acetaminophen (TYLENOL) 500 MG tablet Take 1,000 mg by mouth every 6 hours as needed for mild pain.      baclofen (LIORESAL) 10 MG tablet Take 10 mg by mouth 3 times daily.      celecoxib (CELEBREX) 100 MG capsule Take 1-2 capsules (100-200 mg) by mouth 2 times daily. 90 capsule 1    cyanocobalamin (VITAMIN  B-12) 1000 MCG tablet   3    folic acid (FOLVITE) 1 MG tablet   3    hydroCHLOROthiazide 12.5 MG tablet TAKE 1 TABLET AS NEEDED FOR EDEMA 30  tablet 2    ketoconazole (NIZORAL) 2 % external shampoo Use every other day to scalp as needed 120 mL 0    levothyroxine (SYNTHROID/LEVOTHROID) 150 MCG tablet Take 1 tablet (150 mcg) by mouth daily 90 tablet 3    methotrexate 2.5 MG tablet Take 4 tablets (10 mg) by mouth every 7 days. Labs every 8 - 12 weeks for refills. 16 tablet 0    omega 3 1000 MG CAPS Take 1,280 mg by mouth daily      predniSONE (DELTASONE) 5 MG tablet Take 1 tablet (5 mg) by mouth 2 times daily. 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    Hemorrhage Subdural Trauma Without LOC Active    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, and posterior vitreous detachment    Urinary tract infection, site not specified     Recurrent UTI's     Past Surgical History:   Procedure Laterality Date    BIOPSY MUSCLE DIAGNOSTIC (LOCATION) Right 1/16/2024    Procedure: BIOPSY, MUSCLE - RIGHT THIGH;  Surgeon: Dickson Madrid MD;  Location:  OR    COLONOSCOPY N/A 4/26/2016    Procedure: COLONOSCOPY;  Surgeon: Dontae Del Rio MD;  Location:  GI    COLONOSCOPY N/A 2/22/2024    Procedure: Colonoscopy with polypectomy by using cold snare and two hemoclips applied;  Surgeon: Dontae Del Rio MD;  Location:  GI    ENT SURGERY  1985    Thyroid lobectomy    ESOPHAGOSCOPY, GASTROSCOPY, DUODENOSCOPY (EGD), COMBINED N/A 2/22/2024    Procedure:  "ESOPHAGOGASTRODUODENOSCOPY, WITH BIOPSY by using cold forcep;  Surgeon: Dontae Del Rio MD;  Location:  GI    EYE SURGERY Bilateral 04/20/2021    Cataract Surgery    HC CYSTOSCOPY,INSERT URETERAL STENT      Ureteral stent insertion     FLEX SIGMOIDOSCOPY W BIOPSY  5/30/00     LAPAROSCOPY, SURGICAL; CHOLECYSTECTOMY  1992     THYROIDECTOMY      LIGATION OF HEMORRHOID(S)      Hemorrhoidectomy    UPPER GI ENDOSCOPY,BIOPSY  10/01/01    ZZ LIGATE FALLOPIAN TUBE      [facility] COLONOSCOPY W BIOPSY  4/26/00    [facility] COLONOSCOPY W BIOPSY  10/8/03    ZZ COLONOSCOPY W BIOPSY  6/27/05    REPEAT IN 5 YEARS     Allergies   Allergen Reactions    Doxycycline      Social History   Social History     Tobacco Use    Smoking status: Never     Passive exposure: Never    Smokeless tobacco: Never   Vaping Use    Vaping status: Never Used   Substance Use Topics    Alcohol use: Not Currently     Comment: 1 a day at most    Drug use: No      Past medical history and social history were reviewed.    Physical Examination:  /74   Pulse 97   Temp 98  F (36.7  C) (Temporal)   Resp 16   Ht 1.778 m (5' 10\")   Wt 63.9 kg (140 lb 14.4 oz)   LMP  (LMP Unknown)   SpO2 97%   BMI 20.22 kg/m    Wt Readings from Last 10 Encounters:   12/05/24 64 kg (141 lb)   11/08/24 64.2 kg (141 lb 9.6 oz)   11/05/24 63.5 kg (140 lb)   11/04/24 63.8 kg (140 lb 11.2 oz)   10/22/24 63.5 kg (140 lb)   09/23/24 63.5 kg (140 lb)   09/16/24 63.6 kg (140 lb 3.2 oz)   09/16/24 63.5 kg (140 lb)   09/10/24 64.3 kg (141 lb 11.2 oz)   08/05/24 65.3 kg (144 lb)     Constitutional: Well-appearing female in no acute distress.  Eyes: EOMI, PERRL. No scleral icterus.  ENT: Oral mucosa is moist without lesions or thrush.   Lymphatic: Neck is supple without cervical or supraclavicular lymphadenopathy.   Cardiovascular: Regular rate and rhythm. No murmurs, gallops, or rubs. No peripheral edema.  Respiratory: Clear to auscultation bilaterally. No wheezes or " crackles.  Neurologic: Cranial nerves II through XII are grossly intact.  Skin: No rashes, petechiae, or bruising noted on exposed skin.    Laboratory Data:   Latest Reference Range & Units 12/26/24 12:47   Sodium 135 - 145 mmol/L 136   Potassium 3.4 - 5.3 mmol/L 4.1   Chloride 98 - 107 mmol/L 101   Carbon Dioxide (CO2) 22 - 29 mmol/L 26   Urea Nitrogen 8.0 - 23.0 mg/dL 16.9   Creatinine 0.51 - 0.95 mg/dL 0.52   GFR Estimate >60 mL/min/1.73m2 >90   Calcium 8.8 - 10.4 mg/dL 8.9   Anion Gap 7 - 15 mmol/L 9   Albumin 3.5 - 5.2 g/dL 4.3   Protein Total 6.4 - 8.3 g/dL 6.2 (L)   Alkaline Phosphatase 40 - 150 U/L 142   ALT 0 - 50 U/L 14   AST 0 - 45 U/L 21   Bilirubin Total <=1.2 mg/dL 0.5   Glucose 70 - 99 mg/dL 134 (H)   (L): Data is abnormally low  (H): Data is abnormally high     Latest Reference Range & Units 12/26/24 12:47   WBC 4.0 - 11.0 10e3/uL 6.5   Hemoglobin 11.7 - 15.7 g/dL 11.6 (L)   Hematocrit 35.0 - 47.0 % 35.0   Platelet Count 150 - 450 10e3/uL 185   RBC Count 3.80 - 5.20 10e6/uL 3.36 (L)   MCV 78 - 100 fL 104 (H)   MCH 26.5 - 33.0 pg 34.5 (H)   MCHC 31.5 - 36.5 g/dL 33.1   RDW 10.0 - 15.0 % 15.9 (H)   % Neutrophils % 75   % Lymphocytes % 16   % Monocytes % 8   % Eosinophils % 0   % Basophils % 0   Absolute Basophils 0.0 - 0.2 10e3/uL 0.0   Absolute Eosinophils 0.0 - 0.7 10e3/uL 0.0   Absolute Immature Granulocytes <=0.4 10e3/uL 0.0   Absolute Lymphocytes 0.8 - 5.3 10e3/uL 1.1   Absolute Monocytes 0.0 - 1.3 10e3/uL 0.5   % Immature Granulocytes % 1   Absolute Neutrophils 1.6 - 8.3 10e3/uL 4.8   Absolute NRBCs 10e3/uL 0.0   NRBCs per 100 WBC <1 /100 0   (L): Data is abnormally low  (H): Data is abnormally high    Assessment and Plan:  # Warm Autoimmune Hemolytic Anemia  (positive MARIKA to IgG and complement). Has had multiple relapses, most recently November 2021. She completed prolonged prednisone taper and Rituxan. She is now on Rituxan every 2 months with stable labs  - CBC reviewed. Hgb stable 11.6,  platelets stable 185K.   - Continue with Rituxan every 2 months, will return 12/30 for this, then again Feb 2025  - Will follow-up with FELIZ or MD in 2 months with Rituxan     # CVID  Follows with immunology as well.   - Doing IVIG every 8 weeks when IGG level is <600.  IgG pending today, will plan to give 12/30 as long as levels meet parameters  - CD4 count was low, met with ID, no indication for PJP ppx at this time   - No signficiant infection concerns at this time      # EVI  Prior ferritin was low at 6 in April 2022. S/p Venofer x 3.   - Iron studies WNL Feb 2024  - Consider repeating if drop in hgb      # Inflammatory Arthritis  Following with rheumatology, tapering prednisone and added methotrexate. Has close lab monitoring with rheum  - management per rheumatology  - Per patient discussion with rheum would not increase frequency of Rituxan for this      # PET Findings  Recommended PET due to abnormal marrow signal on MRI. Thankfully PET without any suspicious lymph node or marrow update  - Colonic polyp benign. Had chronic inactive gastritis but otherwise nothing on EGD. Does not need further endoscopy   - Pancreas MRI with cysts <10mm.  [facility] following with imaging, not discussed     20 minutes spent on the date of the encounter doing chart review, review of test results, interpretation of tests, patient visit, and documentation     Isaiah Man PA-C  Department of Hematology and Oncology  [facility]

## 2024-12-23 ENCOUNTER — THERAPY VISIT (OUTPATIENT)
Dept: PHYSICAL THERAPY | Facility: CLINIC | Age: 84
End: 2024-12-23
Payer: MEDICARE

## 2024-12-23 DIAGNOSIS — M16.11 PRIMARY OSTEOARTHRITIS OF RIGHT HIP: Primary | ICD-10-CM

## 2024-12-23 DIAGNOSIS — M17.0 OSTEOARTHRITIS OF PATELLOFEMORAL JOINTS OF BOTH KNEES: ICD-10-CM

## 2024-12-23 PROCEDURE — 97110 THERAPEUTIC EXERCISES: CPT | Mod: GP | Performed by: PHYSICAL THERAPIST

## 2024-12-23 ASSESSMENT — ACTIVITIES OF DAILY LIVING (ADL)
LIMPING: I DO NOT HAVE THE SYMPTOM
PAIN: I HAVE THE SYMPTOM BUT IT DOES NOT AFFECT MY ACTIVITY
GIVING WAY, BUCKLING OR SHIFTING OF KNEE: I HAVE THE SYMPTOM BUT IT DOES NOT AFFECT MY ACTIVITY
STAND: ACTIVITY IS VERY DIFFICULT
RISE FROM A CHAIR: I AM UNABLE TO DO THE ACTIVITY
SWELLING: I DO NOT HAVE THE SYMPTOM
SIT WITH YOUR KNEE BENT: ACTIVITY IS NOT DIFFICULT
SQUAT: I AM UNABLE TO DO THE ACTIVITY
KNEE_ACTIVITY_OF_DAILY_LIVING_SUM: 34
GO DOWN STAIRS: ACTIVITY IS VERY DIFFICULT
KNEE_ACTIVITY_OF_DAILY_LIVING_SCORE: 48.57
WEAKNESS: THE SYMPTOM AFFECTS MY ACTIVITY SEVERELY
STIFFNESS: I DO NOT HAVE THE SYMPTOM
RAW_SCORE: 34
HOW_WOULD_YOU_RATE_THE_OVERALL_FUNCTION_OF_YOUR_KNEE_DURING_YOUR_USUAL_DAILY_ACTIVITIES?: ABNORMAL
AS_A_RESULT_OF_YOUR_KNEE_INJURY,_HOW_WOULD_YOU_RATE_YOUR_CURRENT_LEVEL_OF_DAILY_ACTIVITY?: ABNORMAL
KNEEL ON THE FRONT OF YOUR KNEE: I AM UNABLE TO DO THE ACTIVITY
WALK: ACTIVITY IS FAIRLY DIFFICULT
GO UP STAIRS: ACTIVITY IS VERY DIFFICULT

## 2024-12-26 ENCOUNTER — LAB (OUTPATIENT)
Dept: ONCOLOGY | Facility: CLINIC | Age: 84
End: 2024-12-26
Attending: PHYSICIAN ASSISTANT
Payer: MEDICARE

## 2024-12-26 VITALS
OXYGEN SATURATION: 97 % | RESPIRATION RATE: 16 BRPM | BODY MASS INDEX: 20.17 KG/M2 | TEMPERATURE: 98 F | SYSTOLIC BLOOD PRESSURE: 137 MMHG | HEIGHT: 70 IN | HEART RATE: 97 BPM | WEIGHT: 140.9 LBS | DIASTOLIC BLOOD PRESSURE: 74 MMHG

## 2024-12-26 DIAGNOSIS — D83.9 CVID (COMMON VARIABLE IMMUNODEFICIENCY) (H): ICD-10-CM

## 2024-12-26 DIAGNOSIS — D59.19 OTHER AUTOIMMUNE HEMOLYTIC ANEMIA (H): ICD-10-CM

## 2024-12-26 DIAGNOSIS — D59.10 AUTOIMMUNE HEMOLYTIC ANEMIA (H): ICD-10-CM

## 2024-12-26 DIAGNOSIS — D83.9 CVID (COMMON VARIABLE IMMUNODEFICIENCY) (H): Primary | ICD-10-CM

## 2024-12-26 DIAGNOSIS — D80.3 SELECTIVE DEFICIENCY OF IGG SUBCLASSES (H): ICD-10-CM

## 2024-12-26 LAB
ALBUMIN SERPL BCG-MCNC: 4.3 G/DL (ref 3.5–5.2)
ALP SERPL-CCNC: 142 U/L (ref 40–150)
ALT SERPL W P-5'-P-CCNC: 14 U/L (ref 0–50)
ANION GAP SERPL CALCULATED.3IONS-SCNC: 9 MMOL/L (ref 7–15)
AST SERPL W P-5'-P-CCNC: 21 U/L (ref 0–45)
BASOPHILS # BLD AUTO: 0 10E3/UL (ref 0–0.2)
BASOPHILS NFR BLD AUTO: 0 %
BILIRUB SERPL-MCNC: 0.5 MG/DL
BUN SERPL-MCNC: 16.9 MG/DL (ref 8–23)
CALCIUM SERPL-MCNC: 8.9 MG/DL (ref 8.8–10.4)
CHLORIDE SERPL-SCNC: 101 MMOL/L (ref 98–107)
CREAT SERPL-MCNC: 0.52 MG/DL (ref 0.51–0.95)
EGFRCR SERPLBLD CKD-EPI 2021: >90 ML/MIN/1.73M2
EOSINOPHIL # BLD AUTO: 0 10E3/UL (ref 0–0.7)
EOSINOPHIL NFR BLD AUTO: 0 %
ERYTHROCYTE [DISTWIDTH] IN BLOOD BY AUTOMATED COUNT: 15.9 % (ref 10–15)
GLUCOSE SERPL-MCNC: 134 MG/DL (ref 70–99)
HCO3 SERPL-SCNC: 26 MMOL/L (ref 22–29)
HCT VFR BLD AUTO: 35 % (ref 35–47)
HGB BLD-MCNC: 11.6 G/DL (ref 11.7–15.7)
IMM GRANULOCYTES # BLD: 0 10E3/UL
IMM GRANULOCYTES NFR BLD: 1 %
LYMPHOCYTES # BLD AUTO: 1.1 10E3/UL (ref 0.8–5.3)
LYMPHOCYTES NFR BLD AUTO: 16 %
MCH RBC QN AUTO: 34.5 PG (ref 26.5–33)
MCHC RBC AUTO-ENTMCNC: 33.1 G/DL (ref 31.5–36.5)
MCV RBC AUTO: 104 FL (ref 78–100)
MONOCYTES # BLD AUTO: 0.5 10E3/UL (ref 0–1.3)
MONOCYTES NFR BLD AUTO: 8 %
NEUTROPHILS # BLD AUTO: 4.8 10E3/UL (ref 1.6–8.3)
NEUTROPHILS NFR BLD AUTO: 75 %
NRBC # BLD AUTO: 0 10E3/UL
NRBC BLD AUTO-RTO: 0 /100
PLATELET # BLD AUTO: 185 10E3/UL (ref 150–450)
POTASSIUM SERPL-SCNC: 4.1 MMOL/L (ref 3.4–5.3)
PROT SERPL-MCNC: 6.2 G/DL (ref 6.4–8.3)
RBC # BLD AUTO: 3.36 10E6/UL (ref 3.8–5.2)
SODIUM SERPL-SCNC: 136 MMOL/L (ref 135–145)
WBC # BLD AUTO: 6.5 10E3/UL (ref 4–11)

## 2024-12-26 PROCEDURE — 85025 COMPLETE CBC W/AUTO DIFF WBC: CPT | Performed by: INTERNAL MEDICINE

## 2024-12-26 PROCEDURE — 82784 ASSAY IGA/IGD/IGG/IGM EACH: CPT | Performed by: INTERNAL MEDICINE

## 2024-12-26 PROCEDURE — 80053 COMPREHEN METABOLIC PANEL: CPT | Performed by: INTERNAL MEDICINE

## 2024-12-26 PROCEDURE — G0463 HOSPITAL OUTPT CLINIC VISIT: HCPCS | Performed by: PHYSICIAN ASSISTANT

## 2024-12-26 RX ORDER — HEPARIN SODIUM,PORCINE 10 UNIT/ML
5 VIAL (ML) INTRAVENOUS
OUTPATIENT
Start: 2024-12-30

## 2024-12-26 RX ORDER — DIPHENHYDRAMINE HYDROCHLORIDE 50 MG/ML
25 INJECTION INTRAMUSCULAR; INTRAVENOUS
Start: 2024-12-30

## 2024-12-26 RX ORDER — ACETAMINOPHEN 325 MG/1
650 TABLET ORAL ONCE
OUTPATIENT
Start: 2024-12-30

## 2024-12-26 RX ORDER — METHYLPREDNISOLONE SODIUM SUCCINATE 125 MG/2ML
125 INJECTION INTRAMUSCULAR; INTRAVENOUS
Start: 2024-12-30

## 2024-12-26 RX ORDER — MEPERIDINE HYDROCHLORIDE 25 MG/ML
25 INJECTION INTRAMUSCULAR; INTRAVENOUS; SUBCUTANEOUS
Start: 2024-12-30

## 2024-12-26 RX ORDER — METHYLPREDNISOLONE SODIUM SUCCINATE 40 MG/ML
40 INJECTION INTRAMUSCULAR; INTRAVENOUS
Start: 2024-12-30

## 2024-12-26 RX ORDER — DIPHENHYDRAMINE HCL 25 MG
50 CAPSULE ORAL ONCE
OUTPATIENT
Start: 2024-12-30

## 2024-12-26 RX ORDER — HEPARIN SODIUM (PORCINE) LOCK FLUSH IV SOLN 100 UNIT/ML 100 UNIT/ML
5 SOLUTION INTRAVENOUS
OUTPATIENT
Start: 2024-12-30

## 2024-12-26 RX ORDER — ALBUTEROL SULFATE 0.83 MG/ML
2.5 SOLUTION RESPIRATORY (INHALATION)
OUTPATIENT
Start: 2024-12-30

## 2024-12-26 RX ORDER — ALBUTEROL SULFATE 90 UG/1
1-2 INHALANT RESPIRATORY (INHALATION)
Start: 2024-12-30

## 2024-12-26 RX ORDER — DIPHENHYDRAMINE HYDROCHLORIDE 50 MG/ML
50 INJECTION INTRAMUSCULAR; INTRAVENOUS
Start: 2024-12-30

## 2024-12-26 RX ORDER — MEPERIDINE HYDROCHLORIDE 25 MG/ML
25 INJECTION INTRAMUSCULAR; INTRAVENOUS; SUBCUTANEOUS
OUTPATIENT
Start: 2024-12-30

## 2024-12-26 RX ORDER — EPINEPHRINE 1 MG/ML
0.3 INJECTION, SOLUTION INTRAMUSCULAR; SUBCUTANEOUS EVERY 5 MIN PRN
OUTPATIENT
Start: 2024-12-30

## 2024-12-26 ASSESSMENT — PAIN SCALES - GENERAL: PAINLEVEL_OUTOF10: NO PAIN (0)

## 2024-12-26 NOTE — PROGRESS NOTES
Medical Assistant Note:  Tricia Sosa presents today for blood draw.    Patient seen by provider today: Yes: Isaiah LEWIS   present during visit today: Not Applicable.    Concerns: No Concerns.    Procedure:  Lab draw site: right antecub, Needle type: butterfly, Gauge: 23.    Post Assessment:  Labs drawn without difficulty: Yes.    Discharge Plan:  Departure Mode: Ambulatory.    Face to Face Time: 10 minutes.    Hawa Victor MA

## 2024-12-26 NOTE — NURSING NOTE
"Oncology Rooming Note    December 26, 2024 12:55 PM   Tricia Sosa is a 84 year old female who presents for:    Chief Complaint   Patient presents with    Oncology Clinic Visit     Initial Vitals: /74   Pulse 97   Temp 98  F (36.7  C) (Temporal)   Resp 16   Ht 1.778 m (5' 10\")   Wt 63.9 kg (140 lb 14.4 oz)   LMP  (LMP Unknown)   SpO2 97%   BMI 20.22 kg/m   Estimated body mass index is 20.22 kg/m  as calculated from the following:    Height as of this encounter: 1.778 m (5' 10\").    Weight as of this encounter: 63.9 kg (140 lb 14.4 oz). Body surface area is 1.78 meters squared.  No Pain (0) Comment: standard arthritis pains per patient   No LMP recorded (lmp unknown). Patient is postmenopausal.  Allergies reviewed: Yes  Medications reviewed: Yes    Medications: Medication refills not needed today.  Pharmacy name entered into [facility]:    [place], [place] - [address] [id number] [place], [place] - [address]    Frailty Screening:   Is the patient here for a new oncology consult visit in cancer care? 2. No      Clinical concerns: f/u       Katherin Graf CMA              "

## 2024-12-26 NOTE — LETTER
12/26/2024      Tricia Sosa  [address] [facility] [address]      Dear Colleague,    Thank you for referring your patient, Tricia Sosa, to the [facility] [place]. Please see a copy of my visit note below.    Oncology/Hematology Visit Note  Dec 26, 2024    Reason for visit: Follow up of CVID, autoimmune hemolytic anemia, iron deficiency       Oncology HPI: Tricia Sosa is a 83 year old female who presents with autoimmune hemolytic anemia and IgA deficiency. She was previously seen at [facility].     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  She was very sick at the time and had severe diarrhea.  She lost about 40 pounds in weight.  The workup revealed that she had markedly diminished CD4 counts of 48 and was diagnosed with CMV colitis.  Since then she has been followed in hematology clinic at [place] until recently.  She was noted to have anemia which was fairly long-standing.  She was initially referred for anemia in 2004 and also had a bone marrow biopsy done at that time which revealed hypercellular bone marrow with 70-80% cellularity and normal trilineage hematopoiesis and no morphologic features of MDS or lymphoproliferation.  Her anemia was attributed to her chronic underlying disease.  At the next evaluation in 2002 on 4 aggressive anemia there was no evidence of hemolysis, iron deficiency, B12 or folate deficiency.  Bone marrow biopsy was not pursued.  In summer of 2015 she had progressive fatigue, dyspnea on exertion and worsening anemia with a hemoglobin now of 9.  Workup at this time did suggest a hemolytic anemia with elevated LDH at 709, undetectable haptoglobin and elevated unconjugated bilirubin at 3.3.  Monospecific MARIKA was positive for both IgG and complement.  Cold agglutinin screen was positive with very low titer 1:64.  She was started on prednisone 60 mg daily with supplementation of iron folate and B12  starting 7/31/15.  Her prednisone has been slowly tapered and was discontinued off in January 2016.  She was last followed at [facility] on March 10, 2016 when she had stable hemoglobin.     She was followed by Dr. Matos and Dr. Summers.  Due to relapse of hemolytic anemia, she underwent another course of prednisone that was tapered off and rituximab x 4 weekly doses completed in 9/19/19.     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  She started weekly Rituxan on 7/31/20 x 4 doses, completed on 8/21/20.  She tapered off of prednisone in October 2020.     Due to relapse of her AIHA, she started prednisone again on 6/8/21 at 60 mg daily with slow taper and finished taper in August 2021.     Due to relapse of AIHA again, she started prednisone again on 11/2/21 at 70 mg daily with slow taper.  She also completed weekly Rituxan again x 4 doses 12/6/21-12/29/21.     Venofer 5/5-5/19/22.     She is now on Rituxan every 2 months and IVIG every 8 weeks if IgG <600.    Interval History:  Tricia is here unaccompanied. She is doing OK. Biggest issue right now is ongoing arthritis pain which she is working on with rheumatology. She is tapering prednisone which has led to increased pain. She did start methotrexate. She denies any recent severe infections. Tolerating Rituxan and IVIG well.     Review of Systems:  Patient denies fevers, chills, night sweats, unexplained weight changes, headaches, dizziness, vision or hearing changes, new lumps or bumps, chest pain, shortness of breath, cough, abdominal pain, nausea, vomiting, changes to bowel or bladder, swelling of extremities, bleeding issues, or rash.    Current Outpatient Medications   Medication Sig Dispense Refill     acetaminophen (TYLENOL) 500 MG tablet Take 1,000 mg by mouth every 6 hours as needed for mild pain.       baclofen (LIORESAL) 10 MG tablet Take 10 mg by mouth 3 times daily.       celecoxib (CELEBREX)  100 MG capsule Take 1-2 capsules (100-200 mg) by mouth 2 times daily. 90 capsule 1     cyanocobalamin (VITAMIN  B-12) 1000 MCG tablet   3     folic acid (FOLVITE) 1 MG tablet   3     hydroCHLOROthiazide 12.5 MG tablet TAKE 1 TABLET AS NEEDED FOR EDEMA 30 tablet 2     ketoconazole (NIZORAL) 2 % external shampoo Use every other day to scalp as needed 120 mL 0     levothyroxine (SYNTHROID/LEVOTHROID) 150 MCG tablet Take 1 tablet (150 mcg) by mouth daily 90 tablet 3     methotrexate 2.5 MG tablet Take 4 tablets (10 mg) by mouth every 7 days. Labs every 8 - 12 weeks for refills. 16 tablet 0     omega 3 1000 MG CAPS Take 1,280 mg by mouth daily       predniSONE (DELTASONE) 5 MG tablet Take 1 tablet (5 mg) by mouth 2 times daily. 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    Hemorrhage Subdural Trauma Without LOC Active     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, and posterior vitreous detachment     Urinary tract infection, site not specified     Recurrent UTI's     Past Surgical History:   Procedure Laterality Date     BIOPSY MUSCLE DIAGNOSTIC (LOCATION) Right 1/16/2024    Procedure: BIOPSY, MUSCLE - RIGHT THIGH;  Surgeon: Dickson Madrid MD;  Location: [facility] OR     COLONOSCOPY N/A 4/26/2016    Procedure: COLONOSCOPY;  Surgeon: Dontae Del Rio MD;  Location:  GI     COLONOSCOPY N/A 2/22/2024     "Procedure: Colonoscopy with polypectomy by using cold snare and two hemoclips applied;  Surgeon: Dontae Del Rio MD;  Location:  GI     ENT SURGERY  1985    Thyroid lobectomy     ESOPHAGOSCOPY, GASTROSCOPY, DUODENOSCOPY (EGD), COMBINED N/A 2/22/2024    Procedure: ESOPHAGOGASTRODUODENOSCOPY, WITH BIOPSY by using cold forcep;  Surgeon: Dontae Del Rio MD;  Location:  GI     EYE SURGERY Bilateral 04/20/2021    Cataract Surgery     HC CYSTOSCOPY,INSERT URETERAL STENT      Ureteral stent insertion     HC FLEX SIGMOIDOSCOPY W BIOPSY  5/30/00     HC LAPAROSCOPY, SURGICAL; CHOLECYSTECTOMY  1992      THYROIDECTOMY       LIGATION OF HEMORRHOID(S)      Hemorrhoidectomy     UPPER GI ENDOSCOPY,BIOPSY  10/01/01     ZZC LIGATE FALLOPIAN TUBE       ZZ COLONOSCOPY W BIOPSY  4/26/00     ZZHC COLONOSCOPY W BIOPSY  10/8/03     ZZHC COLONOSCOPY W BIOPSY  6/27/05    REPEAT IN 5 YEARS     Allergies   Allergen Reactions     Doxycycline      Social History   Social History     Tobacco Use     Smoking status: Never     Passive exposure: Never     Smokeless tobacco: Never   Vaping Use     Vaping status: Never Used   Substance Use Topics     Alcohol use: Not Currently     Comment: 1 a day at most     Drug use: No      Past medical history and social history were reviewed.    Physical Examination:  /74   Pulse 97   Temp 98  F (36.7  C) (Temporal)   Resp 16   Ht 1.778 m (5' 10\")   Wt 63.9 kg (140 lb 14.4 oz)   LMP  (LMP Unknown)   SpO2 97%   BMI 20.22 kg/m    Wt Readings from Last 10 Encounters:   12/05/24 64 kg (141 lb)   11/08/24 64.2 kg (141 lb 9.6 oz)   11/05/24 63.5 kg (140 lb)   11/04/24 63.8 kg (140 lb 11.2 oz)   10/22/24 63.5 kg (140 lb)   09/23/24 63.5 kg (140 lb)   09/16/24 63.6 kg (140 lb 3.2 oz)   09/16/24 63.5 kg (140 lb)   09/10/24 64.3 kg (141 lb 11.2 oz)   08/05/24 65.3 kg (144 lb)     Constitutional: Well-appearing female in no acute distress.  Eyes: EOMI, PERRL. No scleral icterus.  ENT: Oral mucosa " is moist without lesions or thrush.   Lymphatic: Neck is supple without cervical or supraclavicular lymphadenopathy.   Cardiovascular: Regular rate and rhythm. No murmurs, gallops, or rubs. No peripheral edema.  Respiratory: Clear to auscultation bilaterally. No wheezes or crackles.  Neurologic: Cranial nerves II through XII are grossly intact.  Skin: No rashes, petechiae, or bruising noted on exposed skin.    Laboratory Data:   Latest Reference Range & Units 12/26/24 12:47   Sodium 135 - 145 mmol/L 136   Potassium 3.4 - 5.3 mmol/L 4.1   Chloride 98 - 107 mmol/L 101   Carbon Dioxide (CO2) 22 - 29 mmol/L 26   Urea Nitrogen 8.0 - 23.0 mg/dL 16.9   Creatinine 0.51 - 0.95 mg/dL 0.52   GFR Estimate >60 mL/min/1.73m2 >90   Calcium 8.8 - 10.4 mg/dL 8.9   Anion Gap 7 - 15 mmol/L 9   Albumin 3.5 - 5.2 g/dL 4.3   Protein Total 6.4 - 8.3 g/dL 6.2 (L)   Alkaline Phosphatase 40 - 150 U/L 142   ALT 0 - 50 U/L 14   AST 0 - 45 U/L 21   Bilirubin Total <=1.2 mg/dL 0.5   Glucose 70 - 99 mg/dL 134 (H)   (L): Data is abnormally low  (H): Data is abnormally high     Latest Reference Range & Units 12/26/24 12:47   WBC 4.0 - 11.0 10e3/uL 6.5   Hemoglobin 11.7 - 15.7 g/dL 11.6 (L)   Hematocrit 35.0 - 47.0 % 35.0   Platelet Count 150 - 450 10e3/uL 185   RBC Count 3.80 - 5.20 10e6/uL 3.36 (L)   MCV 78 - 100 fL 104 (H)   MCH 26.5 - 33.0 pg 34.5 (H)   MCHC 31.5 - 36.5 g/dL 33.1   RDW 10.0 - 15.0 % 15.9 (H)   % Neutrophils % 75   % Lymphocytes % 16   % Monocytes % 8   % Eosinophils % 0   % Basophils % 0   Absolute Basophils 0.0 - 0.2 10e3/uL 0.0   Absolute Eosinophils 0.0 - 0.7 10e3/uL 0.0   Absolute Immature Granulocytes <=0.4 10e3/uL 0.0   Absolute Lymphocytes 0.8 - 5.3 10e3/uL 1.1   Absolute Monocytes 0.0 - 1.3 10e3/uL 0.5   % Immature Granulocytes % 1   Absolute Neutrophils 1.6 - 8.3 10e3/uL 4.8   Absolute NRBCs 10e3/uL 0.0   NRBCs per 100 WBC <1 /100 0   (L): Data is abnormally low  (H): Data is abnormally high    Assessment and Plan:  #  Warm Autoimmune Hemolytic Anemia  (positive MARIKA to IgG and complement). Has had multiple relapses, most recently November 2021. She completed prolonged prednisone taper and Rituxan. She is now on Rituxan every 2 months with stable labs  - CBC reviewed. Hgb stable 11.6, platelets stable 185K.   - Continue with Rituxan every 2 months, will return 12/30 for this, then again Feb 2025  - Will follow-up with FELIZ or MD in 2 months with Rituxan     # CVID  Follows with immunology as well.   - Doing IVIG every 8 weeks when IGG level is <600.  IgG pending today, will plan to give 12/30 as long as levels meet parameters  - CD4 count was low, met with ID, no indication for PJP ppx at this time   - No signficiant infection concerns at this time      # EVI  Prior ferritin was low at 6 in April 2022. S/p Venofer x 3.   - Iron studies WNL Feb 2024  - Consider repeating if drop in hgb      # Inflammatory Arthritis  Following with rheumatology, tapering prednisone and added methotrexate. Has close lab monitoring with rheum  - management per rheumatology  - Per patient discussion with rheum would not increase frequency of Rituxan for this      # PET Findings  Recommended PET due to abnormal marrow signal on MRI. Thankfully PET without any suspicious lymph node or marrow update  - Colonic polyp benign. Had chronic inactive gastritis but otherwise nothing on EGD. Does not need further endoscopy   - Pancreas MRI with cysts <10mm.  [facility] following with imaging, not discussed     20 minutes spent on the date of the encounter doing chart review, review of test results, interpretation of tests, patient visit, and documentation     Isaiah Man PA-C  Department of Hematology and Oncology  [facility]       Again, thank you for allowing me to participate in the care of your patient.        Sincerely,        CHELSEA Marlow    Electronically signed

## 2024-12-27 ENCOUNTER — OFFICE VISIT (OUTPATIENT)
Dept: NEUROLOGY | Facility: CLINIC | Age: 84
End: 2024-12-27
Payer: MEDICARE

## 2024-12-27 DIAGNOSIS — D83.9 CVID (COMMON VARIABLE IMMUNODEFICIENCY) (H): ICD-10-CM

## 2024-12-27 DIAGNOSIS — R26.89 BALANCE PROBLEMS: ICD-10-CM

## 2024-12-27 DIAGNOSIS — M62.81 MUSCLE WEAKNESS (GENERALIZED): ICD-10-CM

## 2024-12-27 DIAGNOSIS — D59.10 AUTOIMMUNE HEMOLYTIC ANEMIA (H): ICD-10-CM

## 2024-12-27 DIAGNOSIS — R53.83 OTHER FATIGUE: ICD-10-CM

## 2024-12-27 DIAGNOSIS — G62.9 POLYNEUROPATHY: Primary | ICD-10-CM

## 2024-12-27 NOTE — LETTER
2024       RE: Tricia Sosa  [address]  [facility] [address]     Dear Colleague,    Thank you for referring your patient, Tricia Sosa, to the [facility] [place] at [facility]. Please see a copy of my visit note below.                          [facility]  Electrodiagnostic Laboratory                 Department of Neurology                                                                                                         Test Date:  2024    Patient: Tricia Sosa : 1940 Physician: Bernadine Perez MD   Sex: Female AGE: 84 year Ref Phys: Marley Heart MD   ID#: [id number]   Technician: Montserrat Holloway     History and Examination:  1UE, 1LE, Weakness    Techniques:  Motor conduction studies were done with surface recording electrodes. Sensory conduction studies were performed with surface electrodes, unless indicated otherwise by (n), designating the use of subdermal recording electrodes. Temperature was monitored and recorded throughout the study. Upper extremities were maintained at a temperature of 32 degrees Centigrade or higher.  EMG was done with a concentric needle electrode.     Results:  Evaluation of the right Fibular (EDB) motor nerve showed decreased conduction velocity (Bel Fibular Head-Ankle, 36 m/s).  The right Median (APB) motor nerve showed reduced amplitude (Wrist, 2.4 mV) and reduced amplitude (Elbow, 1.89 mV).  The left Tibial (AHB) motor, the right Tibial (AHB) motor, the right Ulnar (ADM) motor, and the right radial sensory nerves showed reduced amplitude (L1.18, R0.53, R4.8, R10  V).  The right median sensory and the right ulnar sensory nerves showed reduced amplitude (R3, R6, R7  V) and decreased conduction velocity (R44, R46 m/s).  The left sural sensory and the right sural sensory nerves showed no response.  All remaining nerves (as indicated in the following tables) were  within normal limits.      F Wave studies indicate that the right Fibular F wave has no response.  All remaining F Wave latencies were within normal limits.      Needle evaluation of the right Tibialis Anterior muscle showed slightly increased motor unit amplitude and reduced recruitment.  The right Gastrocnemius muscle showed Decr Ins Act.  The right Vastus Lateralis and the left Tibialis Anterior muscles showed slightly increased motor unit amplitude, slightly increased motor unit duration, and reduced recruitment.  All remaining muscles (as indicated in the following table) showed no evidence of electrical instability.        Interpretation:  Abnormal study.     --There is electrodiagnostic evidence of moderate to severe, sensorimotor, length dependent, axonal polyneuropathy. There was no abnormal spontaneous activity to indicate active/recent denervation.These finding are relatively unchanged compared to the previous EMG in 7/2023.    --There is no convincing electrodiagnostic evidence of lumbar radiculopathy in the right lower extremity as noted in the previous study in 2023.    --There is no electrodiagnostic evidence of myopathy in the muscles tested.     ___________________________  Bernadine Perez MD        Nerve Conduction Studies  Motor Sites      Latency Neg. Amp Neg. Amp Diff Segment Distance Velocity Neg. Dur Neg Area Diff Temperature Comment   Site (ms) Norm (mV) Norm (%)  cm m/s Norm (ms) (%) ( C)    Left Fibular (EDB) Motor   Ankle 5.2  < 6.0 0.65 -  Ankle-EDB 8   6.0  30.9 moved G1   Right Fibular (EDB) Motor   Ankle 5.4  < 6.0 1.20 -  Ankle-EDB 9   5.8  29.6 moved G1   Bel Fibular Head 14.9 - 1.06 - -12 Bel Fibular Head-Ankle 34.2 36  > 38 6.2 -6 29.2    Pop Fossa 17.8 - 0.49 - -54 Pop Fossa-Bel Fibular Head 11.3 39  > 38 6.0 -59 29.1    Right Median (APB) Motor   Wrist 4.0  < 4.4 2.4  > 5.0  Wrist-APB 7.5   4.0  34.5 moved G1   Elbow 8.8 - 1.89  > 5.0 -21 Elbow-Wrist 24 50  > 48 4.4 -20 34.8     Left Tibial (AHB) Motor   Ankle 4.2  < 6.5 1.18  > 5.0  Ankle-AH 5.8   5.7  29.4 moved G1   Right Tibial (AHB) Motor   Ankle 4.1  < 6.5 0.53  > 5.0  Ankle-AH 7   6.8  29.4    Knee 15.1 - 0.52 - -2 Knee-Ankle 43.2 39  > 38 8.5 -12 29.4    Right Ulnar (ADM) Motor   Wrist 2.8  < 3.5 4.8  > 5.0  Wrist-ADM 8   5.0  34.2    Below Elbow 6.8 - 4.3 - -10 Below Elbow-Wrist 21.6 54  > 48 6.1 -3 34    Above Elbow 9.0 - 4.1 - -5 Above Elbow-Below Elbow 10.5 48  > 48 6.4 -3 33.9      F-Wave Sites      Min F-Lat Max-Min F-Lat Mean F-Lat   Site (ms) (ms) (ms)   Right Fibular F-Wave   Ankle NR NR NR   Right Median F-Wave   Wrist 33.3 3.8 35.2   Right Ulnar F-Wave   Wrist 32.3 2.6 33.2     Sensory Sites      Onset Lat Peak Lat Amp (O-P) Amp (P-P) Segment Distance Velocity Temperature Comment   Site ms (ms)  V Norm ( V)  cm m/s Norm ( C)    Right Median Sensory   Wrist-Dig II 3.2 3.9 3  > 10 10 Wrist-Dig II 14 44  > 48 34.4    Right Radial Sensory   Forearm-Wrist 1.60 2.3 10  > 15 19 Forearm-Wrist 10 63 - 35.2    Left Sural Sensory   Calf-Lat Malleolus NR NR NR  > 5 NR Calf-Lat Malleolus 14 NR  > 38 27.9    Right Sural Sensory   Calf-Lat Malleolus NR NR NR  > 5 NR Calf-Lat Malleolus 14 NR  > 38 29.2    Right Ulnar Sensory   Wrist-Dig V 2.7 3.5 6  > 8 7 Wrist-Dig V 12.5 46  > 48 34.7        Electromyography     Side Muscle Ins Act Fibs/PSW Fasc HF Amp Dur Poly Recrt Int Pat   Right Deltoid Nml None Nml 0 Nml Nml 0 Nml Nml   Right Biceps Nml None Nml 0 Nml Nml 0 Nml Nml   Right FDI Nml None Nml 0 Nml Nml 0 Nml Nml   Right EIP Nml None Nml 0 Nml Nml 0 Nml Nml   Right Tib ant Nml None Nml 0 1+ Nml 0 Cecilia Nml   Right Gastroc Decr None Nml 0 Nml Nml 0 Nml Nml   Right Vastus lat Nml None Nml 0 1+ 1+ 0 Cecilia Nml   Left Tib ant Nml None Nml 0 1+ 1+ 0 Cecilia Nml   Left Gastroc Nml None Nml 0 Nml Nml 0 Nml Nml   Right Biceps fem SH Nml None Nml 0 Nml Nml 0 Nml Nml   Right Gluteus med Nml None Nml 0 Nml Nml 0 Nml Nml         NCS  Waveforms:    Motor                    Sensory                  F-Wave                     Again, thank you for allowing me to participate in the care of your patient.      Sincerely,    Bernadine Perez MD

## 2024-12-27 NOTE — PROGRESS NOTES
[facility]  Electrodiagnostic Laboratory                 Department of Neurology                                                                                                         Test Date:  2024    Patient: Tricia Sosa : 1940 Physician: Bernadine Perez MD   Sex: Female AGE: 84 year Ref Phys: Marley Heart MD   ID#: [id number]   Technician: Montserrat Holloway     History and Examination:  1UE, 1LE, Weakness    Techniques:  Motor conduction studies were done with surface recording electrodes. Sensory conduction studies were performed with surface electrodes, unless indicated otherwise by (n), designating the use of subdermal recording electrodes. Temperature was monitored and recorded throughout the study. Upper extremities were maintained at a temperature of 32 degrees Centigrade or higher.  EMG was done with a concentric needle electrode.     Results:  Evaluation of the right Fibular (EDB) motor nerve showed decreased conduction velocity (Bel Fibular Head-Ankle, 36 m/s).  The right Median (APB) motor nerve showed reduced amplitude (Wrist, 2.4 mV) and reduced amplitude (Elbow, 1.89 mV).  The left Tibial (AHB) motor, the right Tibial (AHB) motor, the right Ulnar (ADM) motor, and the right radial sensory nerves showed reduced amplitude (L1.18, R0.53, R4.8, R10  V).  The right median sensory and the right ulnar sensory nerves showed reduced amplitude (R3, R6, R7  V) and decreased conduction velocity (R44, R46 m/s).  The left sural sensory and the right sural sensory nerves showed no response.  All remaining nerves (as indicated in the following tables) were within normal limits.      F Wave studies indicate that the right Fibular F wave has no response.  All remaining F Wave latencies were within normal limits.      Needle evaluation of the right Tibialis Anterior muscle showed slightly increased motor unit amplitude and reduced recruitment.  The right Gastrocnemius  muscle showed Decr Ins Act.  The right Vastus Lateralis and the left Tibialis Anterior muscles showed slightly increased motor unit amplitude, slightly increased motor unit duration, and reduced recruitment.  All remaining muscles (as indicated in the following table) showed no evidence of electrical instability.        Interpretation:  Abnormal study.     --There is electrodiagnostic evidence of moderate to severe, sensorimotor, length dependent, axonal polyneuropathy. There was no abnormal spontaneous activity to indicate active/recent denervation.These finding are relatively unchanged compared to the previous EMG in 7/2023.    --There is no convincing electrodiagnostic evidence of lumbar radiculopathy in the right lower extremity as noted in the previous study in 2023.    --There is no electrodiagnostic evidence of myopathy in the muscles tested.     ___________________________  Bernadine Perez MD        Nerve Conduction Studies  Motor Sites      Latency Neg. Amp Neg. Amp Diff Segment Distance Velocity Neg. Dur Neg Area Diff Temperature Comment   Site (ms) Norm (mV) Norm (%)  cm m/s Norm (ms) (%) ( C)    Left Fibular (EDB) Motor   Ankle 5.2  < 6.0 0.65 -  Ankle-EDB 8   6.0  30.9 moved G1   Right Fibular (EDB) Motor   Ankle 5.4  < 6.0 1.20 -  Ankle-EDB 9   5.8  29.6 moved G1   Bel Fibular Head 14.9 - 1.06 - -12 Bel Fibular Head-Ankle 34.2 36  > 38 6.2 -6 29.2    Pop Fossa 17.8 - 0.49 - -54 Pop Fossa-Bel Fibular Head 11.3 39  > 38 6.0 -59 29.1    Right Median (APB) Motor   Wrist 4.0  < 4.4 2.4  > 5.0  Wrist-APB 7.5   4.0  34.5 moved G1   Elbow 8.8 - 1.89  > 5.0 -21 Elbow-Wrist 24 50  > 48 4.4 -20 34.8    Left Tibial (AHB) Motor   Ankle 4.2  < 6.5 1.18  > 5.0  Ankle-AH 5.8   5.7  29.4 moved G1   Right Tibial (AHB) Motor   Ankle 4.1  < 6.5 0.53  > 5.0  Ankle-AH 7   6.8  29.4    Knee 15.1 - 0.52 - -2 Knee-Ankle 43.2 39  > 38 8.5 -12 29.4    Right Ulnar (ADM) Motor   Wrist 2.8  < 3.5 4.8  > 5.0  Wrist-ADM 8   5.0  34.2     Below Elbow 6.8 - 4.3 - -10 Below Elbow-Wrist 21.6 54  > 48 6.1 -3 34    Above Elbow 9.0 - 4.1 - -5 Above Elbow-Below Elbow 10.5 48  > 48 6.4 -3 33.9      F-Wave Sites      Min F-Lat Max-Min F-Lat Mean F-Lat   Site (ms) (ms) (ms)   Right Fibular F-Wave   Ankle NR NR NR   Right Median F-Wave   Wrist 33.3 3.8 35.2   Right Ulnar F-Wave   Wrist 32.3 2.6 33.2     Sensory Sites      Onset Lat Peak Lat Amp (O-P) Amp (P-P) Segment Distance Velocity Temperature Comment   Site ms (ms)  V Norm ( V)  cm m/s Norm ( C)    Right Median Sensory   Wrist-Dig II 3.2 3.9 3  > 10 10 Wrist-Dig II 14 44  > 48 34.4    Right Radial Sensory   Forearm-Wrist 1.60 2.3 10  > 15 19 Forearm-Wrist 10 63 - 35.2    Left Sural Sensory   Calf-Lat Malleolus NR NR NR  > 5 NR Calf-Lat Malleolus 14 NR  > 38 27.9    Right Sural Sensory   Calf-Lat Malleolus NR NR NR  > 5 NR Calf-Lat Malleolus 14 NR  > 38 29.2    Right Ulnar Sensory   Wrist-Dig V 2.7 3.5 6  > 8 7 Wrist-Dig V 12.5 46  > 48 34.7        Electromyography     Side Muscle Ins Act Fibs/PSW Fasc HF Amp Dur Poly Recrt Int Pat   Right Deltoid Nml None Nml 0 Nml Nml 0 Nml Nml   Right Biceps Nml None Nml 0 Nml Nml 0 Nml Nml   Right FDI Nml None Nml 0 Nml Nml 0 Nml Nml   Right EIP Nml None Nml 0 Nml Nml 0 Nml Nml   Right Tib ant Nml None Nml 0 1+ Nml 0 Cecilia Nml   Right Gastroc Decr None Nml 0 Nml Nml 0 Nml Nml   Right Vastus lat Nml None Nml 0 1+ 1+ 0 Cecilia Nml   Left Tib ant Nml None Nml 0 1+ 1+ 0 Cecilia Nml   Left Gastroc Nml None Nml 0 Nml Nml 0 Nml Nml   Right Biceps fem SH Nml None Nml 0 Nml Nml 0 Nml Nml   Right Gluteus med Nml None Nml 0 Nml Nml 0 Nml Nml         NCS Waveforms:    Motor                    Sensory                  F-Wave

## 2024-12-30 ENCOUNTER — INFUSION THERAPY VISIT (OUTPATIENT)
Dept: INFUSION THERAPY | Facility: CLINIC | Age: 84
End: 2024-12-30
Attending: PHYSICIAN ASSISTANT
Payer: MEDICARE

## 2024-12-30 VITALS
HEART RATE: 106 BPM | WEIGHT: 140.8 LBS | OXYGEN SATURATION: 96 % | DIASTOLIC BLOOD PRESSURE: 74 MMHG | RESPIRATION RATE: 18 BRPM | BODY MASS INDEX: 20.2 KG/M2 | SYSTOLIC BLOOD PRESSURE: 126 MMHG | TEMPERATURE: 99 F

## 2024-12-30 DIAGNOSIS — D59.19 OTHER AUTOIMMUNE HEMOLYTIC ANEMIA (H): Primary | ICD-10-CM

## 2024-12-30 DIAGNOSIS — D80.3 SELECTIVE DEFICIENCY OF IGG SUBCLASSES (H): ICD-10-CM

## 2024-12-30 DIAGNOSIS — D83.9 CVID (COMMON VARIABLE IMMUNODEFICIENCY) (H): ICD-10-CM

## 2024-12-30 DIAGNOSIS — D59.10 AUTOIMMUNE HEMOLYTIC ANEMIA (H): ICD-10-CM

## 2024-12-30 PROCEDURE — 96413 CHEMO IV INFUSION 1 HR: CPT

## 2024-12-30 PROCEDURE — 250N000011 HC RX IP 250 OP 636: Performed by: PHYSICIAN ASSISTANT

## 2024-12-30 PROCEDURE — 96415 CHEMO IV INFUSION ADDL HR: CPT

## 2024-12-30 PROCEDURE — 96375 TX/PRO/DX INJ NEW DRUG ADDON: CPT

## 2024-12-30 PROCEDURE — 96367 TX/PROPH/DG ADDL SEQ IV INF: CPT

## 2024-12-30 PROCEDURE — 250N000011 HC RX IP 250 OP 636: Mod: JZ | Performed by: INTERNAL MEDICINE

## 2024-12-30 PROCEDURE — 96366 THER/PROPH/DIAG IV INF ADDON: CPT

## 2024-12-30 PROCEDURE — 250N000013 HC RX MED GY IP 250 OP 250 PS 637: Performed by: PHYSICIAN ASSISTANT

## 2024-12-30 PROCEDURE — 258N000003 HC RX IP 258 OP 636: Performed by: PHYSICIAN ASSISTANT

## 2024-12-30 RX ORDER — ALBUTEROL SULFATE 90 UG/1
1-2 INHALANT RESPIRATORY (INHALATION)
Start: 2024-12-31

## 2024-12-30 RX ORDER — METHYLPREDNISOLONE SODIUM SUCCINATE 40 MG/ML
40 INJECTION INTRAMUSCULAR; INTRAVENOUS
Start: 2024-12-31

## 2024-12-30 RX ORDER — METHYLPREDNISOLONE SODIUM SUCCINATE 40 MG/ML
20 INJECTION INTRAMUSCULAR; INTRAVENOUS ONCE
Start: 2024-12-31 | End: 2024-12-31

## 2024-12-30 RX ORDER — METHYLPREDNISOLONE SODIUM SUCCINATE 40 MG/ML
20 INJECTION INTRAMUSCULAR; INTRAVENOUS ONCE
Status: COMPLETED | OUTPATIENT
Start: 2024-12-30 | End: 2024-12-30

## 2024-12-30 RX ORDER — MEPERIDINE HYDROCHLORIDE 25 MG/ML
25 INJECTION INTRAMUSCULAR; INTRAVENOUS; SUBCUTANEOUS
OUTPATIENT
Start: 2024-12-31

## 2024-12-30 RX ORDER — HEPARIN SODIUM,PORCINE 10 UNIT/ML
5 VIAL (ML) INTRAVENOUS
OUTPATIENT
Start: 2024-12-31

## 2024-12-30 RX ORDER — DIPHENHYDRAMINE HCL 25 MG
50 CAPSULE ORAL ONCE
Status: COMPLETED | OUTPATIENT
Start: 2024-12-30 | End: 2024-12-30

## 2024-12-30 RX ORDER — DIPHENHYDRAMINE HYDROCHLORIDE 50 MG/ML
50 INJECTION INTRAMUSCULAR; INTRAVENOUS
Start: 2024-12-31

## 2024-12-30 RX ORDER — DIPHENHYDRAMINE HYDROCHLORIDE 50 MG/ML
25 INJECTION INTRAMUSCULAR; INTRAVENOUS
Start: 2024-12-31

## 2024-12-30 RX ORDER — ACETAMINOPHEN 325 MG/1
650 TABLET ORAL ONCE
Status: COMPLETED | OUTPATIENT
Start: 2024-12-30 | End: 2024-12-30

## 2024-12-30 RX ORDER — HEPARIN SODIUM (PORCINE) LOCK FLUSH IV SOLN 100 UNIT/ML 100 UNIT/ML
5 SOLUTION INTRAVENOUS
OUTPATIENT
Start: 2024-12-31

## 2024-12-30 RX ORDER — ALBUTEROL SULFATE 0.83 MG/ML
2.5 SOLUTION RESPIRATORY (INHALATION)
OUTPATIENT
Start: 2024-12-31

## 2024-12-30 RX ORDER — EPINEPHRINE 1 MG/ML
0.3 INJECTION, SOLUTION INTRAMUSCULAR; SUBCUTANEOUS EVERY 5 MIN PRN
OUTPATIENT
Start: 2024-12-31

## 2024-12-30 RX ADMIN — HUMAN IMMUNOGLOBULIN G 35 G: 20 LIQUID INTRAVENOUS at 10:43

## 2024-12-30 RX ADMIN — DIPHENHYDRAMINE HYDROCHLORIDE 25 MG: 25 CAPSULE ORAL at 10:39

## 2024-12-30 RX ADMIN — RITUXIMAB-ABBS 700 MG: 10 INJECTION, SOLUTION INTRAVENOUS at 13:11

## 2024-12-30 RX ADMIN — METHYLPREDNISOLONE SODIUM SUCCINATE 20 MG: 40 INJECTION, POWDER, FOR SOLUTION INTRAMUSCULAR; INTRAVENOUS at 10:40

## 2024-12-30 RX ADMIN — ACETAMINOPHEN 325 MG: 325 TABLET ORAL at 10:39

## 2024-12-30 ASSESSMENT — PAIN SCALES - GENERAL: PAINLEVEL_OUTOF10: MODERATE PAIN (4)

## 2024-12-30 NOTE — PROGRESS NOTES
Infusion Nursing Note:  Tricia Sosa presents today for IVIG and Rituximab.    Patient seen by provider today: No   present during visit today: Not Applicable.    Note: Tricia is feeling well despite ongoing joint pain, for which she follows with rheumatology. See flowsheet for assessment details. Premedicated for both IVIG/rituximab prior to starting IVIG. Pt prefers to only take 325mg tylenol and 25mg benadryl. Has tolerated rituximab in the past using this regimen.       Intravenous Access:  Peripheral IV placed.    Treatment Conditions:  She denies any new symptoms, reactions, fever/elevated temperature, illness, recent major or local infection, being on antibiotics, productive cough, or recent surgery. .  IGG on 12/26: 453    Post Infusion Assessment:  Patient tolerated infusion without incident.  Blood return noted pre and post infusion.  Site patent and intact, free from redness, edema or discomfort.  No evidence of extravasations.  Access discontinued per protocol.       Discharge Plan:   Discharge instructions reviewed with: Patient.  Patient and/or family verbalized understanding of discharge instructions and all questions answered.  AVS to patient via "[employer]"T.  Patient will return end of February (yet to be scheduled, request in place) for next appointment.   Patient discharged in stable condition accompanied by: self.  Departure Mode: Ambulatory with walker.      Rosy Nichole RN

## 2025-01-01 ASSESSMENT — KOOS JR
GOING UP OR DOWN STAIRS: SEVERE
BENDING TO THE FLOOR TO PICK UP OBJECT: MILD
STRAIGHTENING KNEE FULLY: MODERATE
TWISING OR PIVOTING ON KNEE: MODERATE
RISING FROM SITTING: SEVERE
KOOS JR SCORING: 59.38

## 2025-01-06 ENCOUNTER — OFFICE VISIT (OUTPATIENT)
Dept: ORTHOPEDICS | Facility: CLINIC | Age: 85
End: 2025-01-06
Payer: MEDICARE

## 2025-01-06 VITALS — DIASTOLIC BLOOD PRESSURE: 72 MMHG | SYSTOLIC BLOOD PRESSURE: 118 MMHG

## 2025-01-06 DIAGNOSIS — M17.0 OSTEOARTHRITIS OF PATELLOFEMORAL JOINTS OF BOTH KNEES: Primary | ICD-10-CM

## 2025-01-06 PROCEDURE — 20610 DRAIN/INJ JOINT/BURSA W/O US: CPT | Mod: 50 | Performed by: STUDENT IN AN ORGANIZED HEALTH CARE EDUCATION/TRAINING PROGRAM

## 2025-01-06 PROCEDURE — 99213 OFFICE O/P EST LOW 20 MIN: CPT | Mod: 25 | Performed by: STUDENT IN AN ORGANIZED HEALTH CARE EDUCATION/TRAINING PROGRAM

## 2025-01-06 RX ORDER — LIDOCAINE HYDROCHLORIDE 10 MG/ML
7 INJECTION, SOLUTION INFILTRATION; PERINEURAL
Status: COMPLETED | OUTPATIENT
Start: 2025-01-06 | End: 2025-01-06

## 2025-01-06 RX ORDER — TRIAMCINOLONE ACETONIDE 40 MG/ML
40 INJECTION, SUSPENSION INTRA-ARTICULAR; INTRAMUSCULAR
Status: COMPLETED | OUTPATIENT
Start: 2025-01-06 | End: 2025-01-06

## 2025-01-06 RX ADMIN — TRIAMCINOLONE ACETONIDE 40 MG: 40 INJECTION, SUSPENSION INTRA-ARTICULAR; INTRAMUSCULAR at 13:12

## 2025-01-06 RX ADMIN — LIDOCAINE HYDROCHLORIDE 7 ML: 10 INJECTION, SOLUTION INFILTRATION; PERINEURAL at 13:12

## 2025-01-06 NOTE — PROGRESS NOTES
CC: Bilateral knee osteoarthritis    HPI: Patient is an 84-year-old female known to my practice with bilateral knee osteoarthritis.  For full history and physical please see prior notes.  We have been treating this nonoperatively with serial intra-articular corticosteroid injections.  She feels that this is providing good relief.  Her last was in September 2024.  She is interested in retrialing intra-articular corticosteroid injections today.    Objective:   PE:  B/L LE: No open wounds or lacerations noted.  No prior surgical incisions.  Patellar grind positive for pain and crepitus.  Pain to palpation over the medial joint line.  Passive range of motion is 0 to 130 degrees of knee flexion.  Active range of motion: To passive range of motion.    Procedure:   Written informed consent for bilateral knee intra-articular injection was obtained from the patient after discussing the risk and benefits of the procedure.  Risk and benefits including but not limited to bleeding, infection, failure to cure pain and allergic reaction were discussed.  I first began with the right knee.  The anterior inferolateral portal was identified by palpation of bony landmarks.  The skin was cleaned with iodine solution.  Under sterile technique the anterior inferolateral portal was utilized to inject the entirety of the steroid and lidocaine solution.  Soft dressings were applied.  This was then repeated for the left knee patient tolerated the procedure without difficulty.  I counseled the patient on signs and symptoms of allergic reaction and infection.    Large Joint Injection/Arthocentesis: bilateral knee    Date/Time: 1/6/2025 1:12 PM    Performed by: Rashid Cardoso MD  Authorized by: Rashid Cardoso MD    Indications:  Pain  Needle Size:  22 G  Guidance: landmark guided    Approach:  Anterolateral  Location:  Knee  Laterality:  Bilateral      Medications (Right):  40 mg triamcinolone 40 MG/ML; 7 mL lidocaine 1 %  Medications (Left):   40 mg triamcinolone 40 MG/ML; 7 mL lidocaine 1 %  Outcome:  Tolerated well, no immediate complications  Procedure discussed: discussed risks, benefits, and alternatives    Consent Given by:  Patient  Timeout: timeout called immediately prior to procedure    Prep: patient was prepped and draped in usual sterile fashion      A/P:  Patient is an 84-year-old female known to my practice with bilateral knee osteoarthritis.  This most prominent in the patellofemoral compartment.  We have previously been treating her nonoperatively with serial intra-articular corticosteroid injections.  She feels it is still providing good pain relief.  She is very adamant against total knee arthroplasty.  She would like to retrial corticosteroid injections today.  States that is very reasonable.  I did discuss with her that we could trial hyaluronic acid injection when she feels these have worn off.  Discussed with her that it be a similar mechanism to corticosteroid but may last her knee up to 6 months.  She is interested in trialing this during her next round of injections.  Today we performed bilateral corticosteroid injections highlight above.  Discussed the risk and benefits of injection clued not limited to allergic reaction, failure to cure pain, and septic arthritis.  Bilateral corticosteroid injections were performed she tolerated the procedure without difficulty.  She will follow-up with me as needed when she feels the results of these have worn off.  If she is interested in trialing a hyaluronic acid injection, I will submit a prior authorization prior to her next appointment.    Rashid Cardoso MD    [facility]   Department of Orthopedic Surgery      Disclaimer: This note consists of symbols derived from keyboarding, dictation and/or voice recognition software. As a result, there may be errors in the script that have gone undetected. Please consider this when interpreting information found in  this chart.

## 2025-01-06 NOTE — LETTER
1/6/2025      Tricia Sosa  [address] [address] [address]      Dear Colleague,    Thank you for referring your patient, Tricia Sosa, to the [facility] [place]. Please see a copy of my visit note below.    CC: Bilateral knee osteoarthritis    HPI: Patient is an 84-year-old female known to my practice with bilateral knee osteoarthritis.  For full history and physical please see prior notes.  We have been treating this nonoperatively with serial intra-articular corticosteroid injections.  She feels that this is providing good relief.  Her last was in September 2024.  She is interested in retrialing intra-articular corticosteroid injections today.    Objective:   PE:  B/L LE: No open wounds or lacerations noted.  No prior surgical incisions.  Patellar grind positive for pain and crepitus.  Pain to palpation over the medial joint line.  Passive range of motion is 0 to 130 degrees of knee flexion.  Active range of motion: To passive range of motion.    Procedure:   Written informed consent for bilateral knee intra-articular injection was obtained from the patient after discussing the risk and benefits of the procedure.  Risk and benefits including but not limited to bleeding, infection, failure to cure pain and allergic reaction were discussed.  I first began with the right knee.  The anterior inferolateral portal was identified by palpation of bony landmarks.  The skin was cleaned with iodine solution.  Under sterile technique the anterior inferolateral portal was utilized to inject the entirety of the steroid and lidocaine solution.  Soft dressings were applied.  This was then repeated for the left knee patient tolerated the procedure without difficulty.  I counseled the patient on signs and symptoms of allergic reaction and infection.    Large Joint Injection/Arthocentesis: bilateral knee    Date/Time: 1/6/2025 1:12 PM    Performed by: Rashid Cardoso MD  Authorized by:  Rashid Cardoso MD    Indications:  Pain  Needle Size:  22 G  Guidance: landmark guided    Approach:  Anterolateral  Location:  Knee  Laterality:  Bilateral      Medications (Right):  40 mg triamcinolone 40 MG/ML; 7 mL lidocaine 1 %  Medications (Left):  40 mg triamcinolone 40 MG/ML; 7 mL lidocaine 1 %  Outcome:  Tolerated well, no immediate complications  Procedure discussed: discussed risks, benefits, and alternatives    Consent Given by:  Patient  Timeout: timeout called immediately prior to procedure    Prep: patient was prepped and draped in usual sterile fashion      A/P:  Patient is an 84-year-old female known to my practice with bilateral knee osteoarthritis.  This most prominent in the patellofemoral compartment.  We have previously been treating her nonoperatively with serial intra-articular corticosteroid injections.  She feels it is still providing good pain relief.  She is very adamant against total knee arthroplasty.  She would like to retrial corticosteroid injections today.  States that is very reasonable.  I did discuss with her that we could trial hyaluronic acid injection when she feels these have worn off.  Discussed with her that it be a similar mechanism to corticosteroid but may last her knee up to 6 months.  She is interested in trialing this during her next round of injections.  Today we performed bilateral corticosteroid injections highlight above.  Discussed the risk and benefits of injection clued not limited to allergic reaction, failure to cure pain, and septic arthritis.  Bilateral corticosteroid injections were performed she tolerated the procedure without difficulty.  She will follow-up with me as needed when she feels the results of these have worn off.  If she is interested in trialing a hyaluronic acid injection, I will submit a prior authorization prior to her next appointment.    Rashid Cardoso MD    [facility]   Department of Orthopedic  Surgery      Disclaimer: This note consists of symbols derived from keyboarding, dictation and/or voice recognition software. As a result, there may be errors in the script that have gone undetected. Please consider this when interpreting information found in this chart.        Again, thank you for allowing me to participate in the care of your patient.        Sincerely,        Rashid Cardoso MD    Electronically signed

## 2025-01-07 ENCOUNTER — OFFICE VISIT (OUTPATIENT)
Dept: ORTHOPEDICS | Facility: CLINIC | Age: 85
End: 2025-01-07
Payer: MEDICARE

## 2025-01-07 VITALS — BODY MASS INDEX: 20.04 KG/M2 | WEIGHT: 140 LBS | HEIGHT: 70 IN

## 2025-01-07 DIAGNOSIS — M17.0 OSTEOARTHRITIS OF PATELLOFEMORAL JOINTS OF BOTH KNEES: ICD-10-CM

## 2025-01-07 DIAGNOSIS — M16.11 PRIMARY OSTEOARTHRITIS OF RIGHT HIP: Primary | ICD-10-CM

## 2025-01-07 PROCEDURE — G2211 COMPLEX E/M VISIT ADD ON: HCPCS | Performed by: FAMILY MEDICINE

## 2025-01-07 PROCEDURE — 99214 OFFICE O/P EST MOD 30 MIN: CPT | Performed by: FAMILY MEDICINE

## 2025-01-07 NOTE — PROGRESS NOTES
"ASSESSMENT & PLAN  Patient Instructions     1. Primary osteoarthritis of right hip    2. Osteoarthritis of patellofemoral joints of both knees      -Patient is following up for chronic right hip pain due to arthritis and bilateral knee pain due to arthritis  -Patient had bilateral knee intra-articular cortisone injections yesterday with Dr. Cardoso which is already improved both her knee and hip pain.  -Patient states that her hip pain is worst when she is getting in and out of cars, chairs and intermittently with weightbearing activity.  However, pain is usually self-limiting  -Will defer a cortisone injection of her right hip at this time since pain is improved and she just had injections of bilateral knees yesterday  -Patient will continue physical therapy and home exercise program  -Prior authorization for Synvisc 1 was ordered today for bilateral knee arthritis  -Patient will continue following for further treatment recommendation.    -Call direct clinic number [[phone]] at any time with questions or concerns.    Albert Yeo MD [facility] Medicine  [facility]        -----    SUBJECTIVE:  Tricia Sosa is a 84 year old female who is seen in follow-up for right hip pain.They were last seen 11/12/2024.     Since their last visit reports worsening pain. They indicate that their current pain level is 5/10. They have tried rest/activity avoidance, Tylenol, home exercises, physical therapy (5 visits), and previous imaging (xray 11/08/24).      The patient is seen by themselves.    Patient's past medical, surgical, social, and family histories were reviewed today and no changes are noted.    REVIEW OF SYSTEMS:  Constitutional: NEGATIVE for fever, chills, change in weight  Skin: NEGATIVE for worrisome rashes, moles or lesions  GI/: NEGATIVE for bowel or bladder changes  Neuro: NEGATIVE for weakness, dizziness or paresthesias    OBJECTIVE:  Ht 1.778 m (5' 10\")   Wt " 63.5 kg (140 lb)   LMP  (LMP Unknown)   BMI 20.09 kg/m     General: healthy, alert and in no distress  HEENT: no scleral icterus or conjunctival erythema  Skin: no suspicious lesions or rash. No jaundice.  CV: regular rhythm by palpation, no pedal edema  Resp: normal respiratory effort without conversational dyspnea   Psych: normal mood and affect  Gait: normal steady gait with appropriate coordination and balance  Neuro: normal light touch sensory exam of the extremities.    MSK:    RIGHT HIP  Inspection:    No obvious deformity or asymmetry, level pelvis  Palpation:    Tender about the anterior groin/joint line. Otherwise all other landmarks are nontender.  Active Range of Motion:     Flexion within normal limits, IR limited slightly by pain, ER  limited slightly by pain  Strength:    Flexion weakness, adduction weakness, abduction weakness  Special Tests:    Positive: anterior impingement (FADIR), posterior impingement (EX/AB/ER)        Independent visualization of the below image:        Albert Yeo MD, [facility]

## 2025-01-07 NOTE — PATIENT INSTRUCTIONS
1. Primary osteoarthritis of right hip    2. Osteoarthritis of patellofemoral joints of both knees      -Patient is following up for chronic right hip pain due to arthritis and bilateral knee pain due to arthritis  -Patient had bilateral knee intra-articular cortisone injections yesterday with Dr. Cardoso which is already improved both her knee and hip pain.  -Patient states that her hip pain is worst when she is getting in and out of cars, chairs and intermittently with weightbearing activity.  However, pain is usually self-limiting  -Will defer a cortisone injection of her right hip at this time since pain is improved and she just had injections of bilateral knees yesterday  -Patient will continue physical therapy and home exercise program  -Prior authorization for Synvisc 1 was ordered today for bilateral knee arthritis  -Patient will continue following for further treatment recommendation.    -Call direct clinic number [[phone]] at any time with questions or concerns.    Albert Yeo MD [facility]  [facility] [place]        
Chest pain

## 2025-01-07 NOTE — LETTER
1/7/2025      Tricia Sosa  [address] [address] [address]      Dear Colleague,    Thank you for referring your patient, Tricia Sosa, to the [facility] [place]. Please see a copy of my visit note below.    ASSESSMENT & PLAN  Patient Instructions     1. Primary osteoarthritis of right hip    2. Osteoarthritis of patellofemoral joints of both knees      -Patient is following up for chronic right hip pain due to arthritis and bilateral knee pain due to arthritis  -Patient had bilateral knee intra-articular cortisone injections yesterday with Dr. Cardoso which is already improved both her knee and hip pain.  -Patient states that her hip pain is worst when she is getting in and out of cars, chairs and intermittently with weightbearing activity.  However, pain is usually self-limiting  -Will defer a cortisone injection of her right hip at this time since pain is improved and she just had injections of bilateral knees yesterday  -Patient will continue physical therapy and home exercise program  -Prior authorization for Synvisc 1 was ordered today for bilateral knee arthritis  -Patient will continue following for further treatment recommendation.    -Call direct clinic number [[phone]] at any time with questions or concerns.    Albert Yeo MD [facility] Medicine  [facility] [place]        -----    SUBJECTIVE:  Tricia Sosa is a 84 year old female who is seen in follow-up for right hip pain.They were last seen 11/12/2024.     Since their last visit reports worsening pain. They indicate that their current pain level is 5/10. They have tried rest/activity avoidance, Tylenol, home exercises, physical therapy (5 visits), and previous imaging (xray 11/08/24).      The patient is seen by themselves.    Patient's past medical, surgical, social, and family histories were reviewed today and no changes are noted.    REVIEW OF  "SYSTEMS:  Constitutional: NEGATIVE for fever, chills, change in weight  Skin: NEGATIVE for worrisome rashes, moles or lesions  GI/: NEGATIVE for bowel or bladder changes  Neuro: NEGATIVE for weakness, dizziness or paresthesias    OBJECTIVE:  Ht 1.778 m (5' 10\")   Wt 63.5 kg (140 lb)   LMP  (LMP Unknown)   BMI 20.09 kg/m     General: healthy, alert and in no distress  HEENT: no scleral icterus or conjunctival erythema  Skin: no suspicious lesions or rash. No jaundice.  CV: regular rhythm by palpation, no pedal edema  Resp: normal respiratory effort without conversational dyspnea   Psych: normal mood and affect  Gait: normal steady gait with appropriate coordination and balance  Neuro: normal light touch sensory exam of the extremities.    MSK:    RIGHT HIP  Inspection:    No obvious deformity or asymmetry, level pelvis  Palpation:    Tender about the anterior groin/joint line. Otherwise all other landmarks are nontender.  Active Range of Motion:     Flexion within normal limits, IR limited slightly by pain, ER  limited slightly by pain  Strength:    Flexion weakness, adduction weakness, abduction weakness  Special Tests:    Positive: anterior impingement (FADIR), posterior impingement (EX/AB/ER)        Independent visualization of the below image:        Albert Yeo MD, [facility]      Again, thank you for allowing me to participate in the care of your patient.        Sincerely,        Albert Yeo, MD    Electronically signed"

## 2025-01-14 ENCOUNTER — LAB (OUTPATIENT)
Dept: LAB | Facility: CLINIC | Age: 85
End: 2025-01-14
Payer: MEDICARE

## 2025-01-14 ENCOUNTER — OFFICE VISIT (OUTPATIENT)
Dept: RHEUMATOLOGY | Facility: CLINIC | Age: 85
End: 2025-01-14
Payer: MEDICARE

## 2025-01-14 VITALS
DIASTOLIC BLOOD PRESSURE: 65 MMHG | HEIGHT: 70 IN | SYSTOLIC BLOOD PRESSURE: 142 MMHG | HEART RATE: 70 BPM | OXYGEN SATURATION: 97 % | WEIGHT: 143.2 LBS | BODY MASS INDEX: 20.5 KG/M2

## 2025-01-14 DIAGNOSIS — Z71.89 ENCOUNTER TO DISCUSS TREATMENT OPTIONS: ICD-10-CM

## 2025-01-14 DIAGNOSIS — Z79.899 HIGH RISK MEDICATION USE: ICD-10-CM

## 2025-01-14 DIAGNOSIS — M79.642 BILATERAL HAND PAIN: Primary | ICD-10-CM

## 2025-01-14 DIAGNOSIS — M79.641 BILATERAL HAND PAIN: Primary | ICD-10-CM

## 2025-01-14 DIAGNOSIS — M19.049 HAND ARTHRITIS: ICD-10-CM

## 2025-01-14 DIAGNOSIS — M06.09 SERONEGATIVE RHEUMATOID ARTHRITIS OF MULTIPLE SITES (H): ICD-10-CM

## 2025-01-14 LAB
ALBUMIN SERPL BCG-MCNC: 4.3 G/DL (ref 3.5–5.2)
ALT SERPL W P-5'-P-CCNC: 19 U/L (ref 0–50)
CREAT SERPL-MCNC: 0.55 MG/DL (ref 0.51–0.95)
EGFRCR SERPLBLD CKD-EPI 2021: 90 ML/MIN/1.73M2
ERYTHROCYTE [DISTWIDTH] IN BLOOD BY AUTOMATED COUNT: 16.2 % (ref 10–15)
HCT VFR BLD AUTO: 34.7 % (ref 35–47)
HGB BLD-MCNC: 11.9 G/DL (ref 11.7–15.7)
MCH RBC QN AUTO: 35.4 PG (ref 26.5–33)
MCHC RBC AUTO-ENTMCNC: 34.3 G/DL (ref 31.5–36.5)
MCV RBC AUTO: 103 FL (ref 78–100)
PLATELET # BLD AUTO: 170 10E3/UL (ref 150–450)
RBC # BLD AUTO: 3.36 10E6/UL (ref 3.8–5.2)
WBC # BLD AUTO: 7.2 10E3/UL (ref 4–11)

## 2025-01-14 PROCEDURE — 82565 ASSAY OF CREATININE: CPT

## 2025-01-14 PROCEDURE — 82040 ASSAY OF SERUM ALBUMIN: CPT

## 2025-01-14 PROCEDURE — 85018 HEMOGLOBIN: CPT

## 2025-01-14 PROCEDURE — 36415 COLL VENOUS BLD VENIPUNCTURE: CPT

## 2025-01-14 PROCEDURE — 85048 AUTOMATED LEUKOCYTE COUNT: CPT

## 2025-01-14 PROCEDURE — G2211 COMPLEX E/M VISIT ADD ON: HCPCS | Performed by: INTERNAL MEDICINE

## 2025-01-14 PROCEDURE — 99214 OFFICE O/P EST MOD 30 MIN: CPT | Performed by: INTERNAL MEDICINE

## 2025-01-14 PROCEDURE — 84460 ALANINE AMINO (ALT) (SGPT): CPT

## 2025-01-14 NOTE — PROGRESS NOTES
Assessment and Plan    1.  Seronegative rheumatoid arthritis.  She was started on methotrexate last month which she is tolerating well.  We will update her labs today and if they are stable she will INCREASE METHOTREXATE TO 15 MG WEEKLY (written instructions provided to the patient).  She will stay on 2.5 mg prednisone, a week prior to her next visit she will discontinue prednisone to see if methotrexate can keep the pain and swelling in her hands and wrists under control.    2.  She also has osteoarthritis of the knee joints (advanced patellofemoral, moderate lateral compartment, also osteoarthritis of the right hip for which she is following with orthopedics.  Synvisc injections are being planned for her knee osteoarthritis by orthopedics.    Follow-up 6 weeks    CC PCP            Rheumatology follow-up visit note       Subjective: Started methotrexate (10 mg weekly), tolerating it well.  Prednisone down to 2.5 mg daily        Tricia is a very pleasant 84-year-old lady with a history of autoimmune hemolytic anemia for which she has been getting Rituxan infusions since 2019.  She follows with hematology and is getting rituximab infusions every 2 months, IVIG was also added recently.  She has a history of osteoarthritis involving her spine, was told she had osteoarthritis in the knee joints.  She was relatively comfortable and functional until July 2020 when she started noticing acute worsening and swelling of her knee pain.  This was followed by pain in her fingers particularly the PIP and DIP knuckles, she noticed some neck stiffness and her hips were also quite uncomfortable especially the right hip.  The worsening pain in her hands, hips was a significant change for her and she was quite uncomfortable.  She was seen by orthopedics, they injected her knee joints which relieved her knee pain to some extent.  X-rays of the knees show advanced bone-on-bone osteoarthritis at the patellofemoral joints and moderate  "arthritis of the lateral compartments especially the right knee.  Recently she had cortisone injections in both knees which helped her joint pain elsewhere as well temporarily.  She is also seeing orthopedics for osteoarthritis in the right hip.  The right hip bothers her more mostly with certain movements guarding getting up, fortunately with ordinary walking the right hip pain is not too debilitating.    Hand x-rays reviewed, the MCP joints are normal without any erosions, DJD noted in the PIP and DIP knuckles with gullwing changes in the right little finger DIP knuckle.        She is , retired (post ), has a college education, lives with her son and his family in [place].  She does not smoke, no alcohol.    Past medical history notable for autoimmune hemolytic anemia, hypothyroidism.  She has also been on steroids in the past for the hemolytic anemia.        DETAILED EXAMINATION  01/14/25  :    Vitals:    01/14/25 1407   BP: (!) 142/65   BP Location: Right arm   Patient Position: Sitting   Cuff Size: Adult Regular   Pulse: 70   SpO2: 97%   Weight: 65 kg (143 lb 3.2 oz)   Height: 1.778 m (5' 10\")      Pleasant elderly lady, alert and oriented x 3, uses a walker to get by.  In moderate discomfort this afternoon (hands, hips, knees)     Head/neck.  No rash, no jaundice, no ocular redness     Lungs clear, no crackles or wheezing     Heart sounds regular     Abdomen soft, nontender     Musculoskeletal: Note: Patient on 2.5 mg prednisone which can potentially mask joint synovitis and swelling     Hands.  + Mild puffiness of the right middle finger PIP knuckle noted, the remaining knuckles are nonswollen and nontender bilaterally.  She does have tenderness at the CMC joints of the thumbs which I explained is due to osteoarthritis (not RA)     Elbows full range of motion, no synovitis or redness     Shoulders normal abduction and external rotation bilaterally          Knees.  Small effusions, " no redness or warmth.     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     Priority: Medium    Mild obstructive sleep apnea 11/24/2019     Priority: Medium    DDD (degenerative disc disease), lumbar 08/05/2019     Priority: Medium    Right-sided low back pain with right-sided sciatica, unspecified chronicity 11/16/2017     Priority: Medium    Other autoimmune hemolytic anemia (H) 09/21/2017     Priority: Medium     IMO Regulatory Load OCT 2020      Diarrhea 07/01/2016     Priority: Medium    Other amnesia 02/29/2016     Priority: Medium    Ataxia, unspecified 01/27/2016     Priority: Medium    Other acquired deformities of left foot 01/27/2016     Priority: Medium    Other acquired deformities of right foot 01/27/2016     Priority: Medium    Other disturbances of skin sensation 01/27/2016     Priority: Medium    B12 deficiency 08/03/2015     Priority: Medium     Started on B12 injections 8/3/15.      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 Estevez.        Bronchiectasis (H) 04/16/2009     Priority: Medium    Lymphocytic colitis 09/27/2001     Priority: Medium     Problem list name updated by automated process. Provider to review      Acquired hypothyroidism 09/27/2001     Priority: Medium     Past Surgical History:   Procedure Laterality Date    BIOPSY MUSCLE DIAGNOSTIC (LOCATION) Right 1/16/2024    Procedure: BIOPSY, MUSCLE - RIGHT THIGH;  Surgeon: Dickson Madrid MD;  Location:  [facility] OR    COLONOSCOPY N/A 4/26/2016    Procedure: COLONOSCOPY;  Surgeon: Dontae Del Rio MD;  Location:  GI    COLONOSCOPY N/A 2/22/2024    Procedure: Colonoscopy with polypectomy by using cold snare and two hemoclips applied;  Surgeon: Dontae Del Rio MD;  Location:  GI    ENT SURGERY  1985    Thyroid lobectomy    ESOPHAGOSCOPY, GASTROSCOPY, DUODENOSCOPY (EGD), COMBINED N/A 2/22/2024    Procedure: ESOPHAGOGASTRODUODENOSCOPY, WITH BIOPSY by using cold forcep;  Surgeon: Dontae Del Rio MD;  Location:  GI    EYE SURGERY Bilateral 04/20/2021    Cataract Surgery    HC CYSTOSCOPY,INSERT URETERAL STENT      Ureteral stent insertion    HC FLEX SIGMOIDOSCOPY W BIOPSY  5/30/00    HC LAPAROSCOPY, SURGICAL; CHOLECYSTECTOMY  1992     THYROIDECTOMY      LIGATION OF HEMORRHOID(S)      Hemorrhoidectomy    UPPER GI ENDOSCOPY,BIOPSY  10/01/01    ZZC LIGATE FALLOPIAN TUBE      ZZHC COLONOSCOPY W BIOPSY  4/26/00    ZZHC COLONOSCOPY W BIOPSY  10/8/03    ZZHC COLONOSCOPY W BIOPSY  6/27/05    REPEAT IN 5 YEARS      Past Medical History:   Diagnosis Date    Arthritis     WARD (dyspnea on exertion)     External hemorrhoids without mention of complication 06/27/2005    Hemolytic anemia     autoimmune    Hypothyroidism     IgA deficiency (H)     Myopia of both eyes with astigmatism and presbyopia 08/16/2017    Other malaise and fatigue     Other severe protein-calorie malnutrition     Other vitreous opacities 12/19/2006    Sleep apnea     Thyroid disease     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    Hemorrhage Subdural Trauma Without LOC Active    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, and posterior vitreous detachment    Urinary tract infection, site not specified     Recurrent UTI's     Allergies   Allergen Reactions    Doxycycline      Current Outpatient Medications   Medication Sig Dispense Refill    cyanocobalamin (VITAMIN  B-12) 1000 MCG tablet   3    folic acid  (FOLVITE) 1 MG tablet   3    hydroCHLOROthiazide 12.5 MG tablet TAKE 1 TABLET AS NEEDED FOR EDEMA 30 tablet 2    ketoconazole (NIZORAL) 2 % external shampoo Use every other day to scalp as needed 120 mL 0    levothyroxine (SYNTHROID/LEVOTHROID) 150 MCG tablet Take 1 tablet (150 mcg) by mouth daily 90 tablet 3    methotrexate 2.5 MG tablet Take 4 tablets (10 mg) by mouth every 7 days. Labs every 8 - 12 weeks for refills. 16 tablet 0    predniSONE (DELTASONE) 5 MG tablet Take 1 tablet (5 mg) by mouth 2 times daily. 60 tablet 1    triamcinolone (KENALOG) 0.1 % external ointment Apply topically 2 times daily      omega 3 1000 MG CAPS Take 1,280 mg by mouth daily (Patient not taking: Reported on 1/14/2025)       family history includes Anxiety Disorder in her sister; Breast Cancer in her sister and sister; Cerebrovascular Disease in her father; Colon Cancer in her niece; Colon Cancer (age of onset: 90) in her mother; Dementia in her brother and brother; Depression in her sister; Hyperlipidemia in her sister; Lung Cancer in her sister; Other Cancer in her niece and sister; Pancreatic Cancer in her brother; Prostate Cancer in her brother; Thyroid Disease in her brother, brother, and sister.  Social Connections: Not on file          WBC   Date Value Ref Range Status   07/06/2021 7.6 4.0 - 11.0 10e9/L Final     WBC Count   Date Value Ref Range Status   12/26/2024 6.5 4.0 - 11.0 10e3/uL Final     RBC Count   Date Value Ref Range Status   12/26/2024 3.36 (L) 3.80 - 5.20 10e6/uL Final   07/06/2021 3.42 (L) 3.8 - 5.2 10e12/L Final     Hemoglobin   Date Value Ref Range Status   12/26/2024 11.6 (L) 11.7 - 15.7 g/dL Final   07/06/2021 10.1 (L) 11.7 - 15.7 g/dL Final     Hematocrit   Date Value Ref Range Status   12/26/2024 35.0 35.0 - 47.0 % Final   07/06/2021 32.2 (L) 35.0 - 47.0 % Final     MCV   Date Value Ref Range Status   12/26/2024 104 (H) 78 - 100 fL Final   07/06/2021 94 78 - 100 fl Final     MCH   Date Value Ref Range  Status   12/26/2024 34.5 (H) 26.5 - 33.0 pg Final   07/06/2021 29.5 26.5 - 33.0 pg Final     Platelet Count   Date Value Ref Range Status   12/26/2024 185 150 - 450 10e3/uL Final   07/06/2021 130 (L) 150 - 450 10e9/L Final     % Lymphocytes   Date Value Ref Range Status   12/26/2024 16 % Final   07/06/2021 6.2 % Final     AST   Date Value Ref Range Status   12/26/2024 21 0 - 45 U/L Final   06/29/2021 30 0 - 45 U/L Final     ALT   Date Value Ref Range Status   12/26/2024 14 0 - 50 U/L Final   06/29/2021 36 0 - 50 U/L Final     Albumin   Date Value Ref Range Status   12/26/2024 4.3 3.5 - 5.2 g/dL Final   09/16/2022 4.2 3.4 - 5.0 g/dL Final   06/29/2021 3.9 3.4 - 5.0 g/dL Final     Alkaline Phosphatase   Date Value Ref Range Status   12/26/2024 142 40 - 150 U/L Final   06/29/2021 97 40 - 150 U/L Final     Creatinine   Date Value Ref Range Status   12/26/2024 0.52 0.51 - 0.95 mg/dL Final   06/29/2021 0.65 0.52 - 1.04 mg/dL Final     GFR Estimate   Date Value Ref Range Status   12/26/2024 >90 >60 mL/min/1.73m2 Final     Comment:     eGFR calculated using 2021 CKD-EPI equation.   06/29/2021 84 >60 mL/min/[1.73_m2] Final     Comment:     Non  GFR Calc  Starting 12/18/2018, serum creatinine based estimated GFR (eGFR) will be   calculated using the Chronic Kidney Disease Epidemiology Collaboration   (CKD-EPI) equation.       GFR Estimate If Black   Date Value Ref Range Status   06/29/2021 >90 >60 mL/min/[1.73_m2] Final     Comment:      GFR Calc  Starting 12/18/2018, serum creatinine based estimated GFR (eGFR) will be   calculated using the Chronic Kidney Disease Epidemiology Collaboration   (CKD-EPI) equation.       Sed Rate   Date Value Ref Range Status   07/29/2020 119 (H) 0 - 30 mm/h Final     Erythrocyte Sedimentation Rate   Date Value Ref Range Status   04/15/2024 18 0 - 30 mm/hr Final     CRP Inflammation   Date Value Ref Range Status   04/14/2015 <2.9 0.0 - 8.0 mg/L Final

## 2025-01-16 DIAGNOSIS — M19.049 HAND ARTHRITIS: ICD-10-CM

## 2025-01-16 RX ORDER — METHOTREXATE 2.5 MG/1
15 TABLET ORAL
Qty: 42 TABLET | Refills: 0 | Status: SHIPPED | OUTPATIENT
Start: 2025-01-16

## 2025-01-28 ENCOUNTER — THERAPY VISIT (OUTPATIENT)
Dept: PHYSICAL THERAPY | Facility: CLINIC | Age: 85
End: 2025-01-28
Payer: MEDICARE

## 2025-01-28 DIAGNOSIS — M25.561 CHRONIC PAIN OF BOTH KNEES: ICD-10-CM

## 2025-01-28 DIAGNOSIS — M16.11 PRIMARY OSTEOARTHRITIS OF RIGHT HIP: Primary | ICD-10-CM

## 2025-01-28 DIAGNOSIS — M25.562 CHRONIC PAIN OF BOTH KNEES: ICD-10-CM

## 2025-01-28 DIAGNOSIS — G89.29 CHRONIC PAIN OF BOTH KNEES: ICD-10-CM

## 2025-01-28 DIAGNOSIS — M17.0 OSTEOARTHRITIS OF PATELLOFEMORAL JOINTS OF BOTH KNEES: ICD-10-CM

## 2025-01-28 PROCEDURE — 97112 NEUROMUSCULAR REEDUCATION: CPT | Mod: GP | Performed by: PHYSICAL THERAPIST

## 2025-01-28 PROCEDURE — 97110 THERAPEUTIC EXERCISES: CPT | Mod: GP | Performed by: PHYSICAL THERAPIST

## 2025-01-28 NOTE — PROGRESS NOTES
PT PROGRESS NOTE    ASSESSMENT  Patient has been independent for 3 weeks with program and riding stationary bike. She continues to demonstrate weakness. Recommended continued PT exercise program.  PLAN  Discharge for continue independent HEP  GOALS   Progressing; nearly met       01/28/25 0500   Appointment Info   Signing clinician's name / credentials Mahnaz Churchill PT, DPT   Visits Used 7   Medical Diagnosis Hip and knee OA   PT Tx Diagnosis B LE weakness; R>L hip pain; B knee pain   Progress Note/Certification   Start of Care Date 11/12/24   Onset of illness/injury or Date of Surgery 07/01/24   Therapy Frequency 1x every 2 weeks   Predicted Duration 4   Certification date from 01/22/25   Certification date to 02/18/25   GOALS   PT Goals 3   PT Goal 1   Goal Identifier HEP   Goal Description Patient will be consistent and independent with HEP in order to achieve functional goals.   Rationale to maximize safety and independence with performance of ADLs and functional tasks;to maximize safety and independence within the home;to maximize safety and independence within the community;to maximize safety and independence with transportation;to maximize safety and independence with self cares   Goal Progress met   Target Date 12/10/24   Date Met 12/10/24   PT Goal 2   Goal Identifier Walking   Goal Description Patient will report decreased hip and knee pain when standing or walking for at least 15 minutes to allow her increased acess to her home/community.   Rationale to maximize safety and independence with performance of ADLs and functional tasks;to maximize safety and independence within the home;to maximize safety and independence within the community;to maximize safety and independence with transportation;to maximize safety and independence with self cares   Goal Progress met as long as using walker   Target Date 02/18/25   Date Met 01/28/25   PT Goal 3   Goal Identifier Sit to Stand   Goal Description Patient will  be able to complete at least 5xSTS without UE support from standard chair to improve functional mobility and decrease fall risk.   Rationale to maximize safety and independence with transportation;to maximize safety and independence with performance of ADLs and functional tasks;to maximize safety and independence with self cares   Goal Progress remains challenging   Target Date 02/18/25   Subjective Report   Subjective Report Notes that since the injections she has been feeling   Therapeutic Procedure/Exercise   Therapeutic Procedures: strength, endurance, ROM, flexibility minutes (05002) 28   PTRx Ther Proc 1 Clamshell with Theraband   PTRx Ther Proc 1 - Details RTB 2x15 each side- appropriate fatigue cues for good positioning without rolling hips   PTRx Ther Proc 2 Bridging #1   PTRx Ther Proc 2 - Details 3x10 with reported challenge   PTRx Ther Proc 3 Supine Abdominal Exercise #3 (Marching)   PTRx Ther Proc 3 - Details 2x10   PTRx Ther Proc 4 Hip Flexion Straight Leg Raise   PTRx Ther Proc 4 - Details 2x12   PTRx Ther Proc 5 Supine Lumbar Hip Roll   PTRx Ther Proc 5 - Details 2x20   PTRx Ther Proc 6 Short Arc Knee Extension (Long Sitting)   PTRx Ther Proc 6 - Details 1x10   PTRx Ther Proc 7 Roll Ins Hooklying   PTRx Ther Proc 7 - Details 2x15 with 5 second hold   PTRx Ther Proc 8 Toe Raises   PTRx Ther Proc 8 - Details 2x20    PTRx Ther Proc 9 Standing Hip Abduction   PTRx Ther Proc 9 - Details 2x15   Skilled Intervention for improved strength and pain modulation   Patient Response/Progress tolerable, but continues to be appropriately challenging   Therapeutic Activity   Therapeutic Activities: dynamic activities to improve functional performance minutes (36542) 3   Ther Act 1 Continue with Exercises Biking   Ther Act 1 - Details x30 minutes daily drink a full glas of water before biking   Neuromuscular Re-education   Neuromuscular re-ed of mvmt, balance, coord, kinesthetic sense, posture, proprioception minutes  (89723) 8   Neuro Re-ed 1 Single Leg Balance   Neuro Re-ed 1 - Details x30 seconds   PTRx Neuro Re-ed 1 Marching in Place   PTRx Neuro Re-ed 1 - Details 2x30 second durations   Skilled Intervention for improved stability   Patient Response/Progress demonstrating continued need for the walker due to balance   PTRx Neuro Re-ed 2 Weight Shift in Staggered Stance with Chair   PTRx Neuro Re-ed 2 - Details 1x20   Education   Learner/Method Patient;No Barriers to Learning;Pictures/Video   Plan   Home program Ptrx HEP- printouts   Total Session Time   Timed Code Treatment Minutes 39   Total Treatment Time (sum of timed and untimed services) 39

## 2025-01-31 ENCOUNTER — HOSPITAL ENCOUNTER (EMERGENCY)
Facility: CLINIC | Age: 85
Discharge: HOME | End: 2025-01-31
Attending: EMERGENCY MEDICINE | Admitting: EMERGENCY MEDICINE
Payer: MEDICARE

## 2025-01-31 ENCOUNTER — APPOINTMENT (OUTPATIENT)
Dept: CT IMAGING | Facility: CLINIC | Age: 85
End: 2025-01-31
Attending: EMERGENCY MEDICINE
Payer: MEDICARE

## 2025-01-31 VITALS
RESPIRATION RATE: 18 BRPM | TEMPERATURE: 99.1 F | HEIGHT: 71 IN | SYSTOLIC BLOOD PRESSURE: 143 MMHG | DIASTOLIC BLOOD PRESSURE: 62 MMHG | HEART RATE: 80 BPM | OXYGEN SATURATION: 99 % | WEIGHT: 143.96 LBS | BODY MASS INDEX: 20.15 KG/M2

## 2025-01-31 DIAGNOSIS — M50.30 DEGENERATIVE DISC DISEASE, CERVICAL: ICD-10-CM

## 2025-01-31 DIAGNOSIS — M54.2 NECK PAIN: ICD-10-CM

## 2025-01-31 PROCEDURE — 99284 EMERGENCY DEPT VISIT MOD MDM: CPT | Mod: 25

## 2025-01-31 PROCEDURE — 72125 CT NECK SPINE W/O DYE: CPT

## 2025-01-31 PROCEDURE — 70450 CT HEAD/BRAIN W/O DYE: CPT

## 2025-01-31 ASSESSMENT — COLUMBIA-SUICIDE SEVERITY RATING SCALE - C-SSRS
2. HAVE YOU ACTUALLY HAD ANY THOUGHTS OF KILLING YOURSELF IN THE PAST MONTH?: NO
1. IN THE PAST MONTH, HAVE YOU WISHED YOU WERE DEAD OR WISHED YOU COULD GO TO SLEEP AND NOT WAKE UP?: NO
6. HAVE YOU EVER DONE ANYTHING, STARTED TO DO ANYTHING, OR PREPARED TO DO ANYTHING TO END YOUR LIFE?: NO

## 2025-01-31 ASSESSMENT — ACTIVITIES OF DAILY LIVING (ADL)
ADLS_ACUITY_SCORE: 42
ADLS_ACUITY_SCORE: 42

## 2025-01-31 NOTE — ED PROVIDER NOTES
Emergency Department Note      History of Present Illness     Chief Complaint   Neck Pain      HPI   Tricia Sosa is a 84 year old female with a history of arthritis who presets for evaluation of neck pain. Patient reports that she has been having pain in her head and neck stiffness since August. She also notes onset of pain in her hands and knees around the same time. She feels that in the last few days her pain has worsened and become constant, and she feels that she is having decreasing strength in her hands. She has been doing neck exercises for the last few days without improvement. She notes a fall in June but otherwise denies any recent falls or injuries. She also notes some increased urgency to urinate, denies any incontinence. She denies any vision changes or speech changes. She denies any acute difficulty ambulating or standing up. She denies any acute pain in her upper or lower extremities. Called PCP earlier to schedule appointment and referred to ED for further evaluation.     Independent Historian   None    Review of External Notes   Nurse triage note from earlier today. Patient called for whole body weakness and head pain and neck stiffness.     Past Medical History   Medical History and Problem List   Lympocytic colitis  Hypothyroidism   CVID  B12 deficiency   Other autoimmune hemolytic anemia  Right sided low back pain   Bronchiectasis  Mild MARIE  Selective deficiency of IgG subclasses  Anemia  Protein calorie malnutrition  Ataxia  DDD  Diarrhea  Other acquired deformities of bilateral feet  Other amnesia  Other disturbances of skin sensation   Hand arthritis  Arthritis  WARD  External hemorrhoids without mention of complication  Hemolytic anemia  Myopia of both eyes without astigmatism and presbyopia  Malaise and fatigue  Vitreous opacities  Traumatic intracranial subdural hematoma  Unspecified congenital anomaly of eye     Medications  "  Hydrochlorothiazide  Methotrexate  Prednisone  Levothryoxine    Surgical History   Hemorrhoidectomy   Thyroidectomy   Ureteral stent insertion  Bilateral cataract surgery   Thyroid lobectomy   Physical Exam     Patient Vitals for the past 24 hrs:   BP Temp Pulse Resp SpO2 Height Weight   01/31/25 1733 (!) 143/62 -- 80 18 99 % -- --   01/31/25 1439 (!) 174/80 99.1  F (37.3  C) 83 16 96 % 1.803 m (5' 11\") 65.3 kg (143 lb 15.4 oz)     Physical Exam  Gen: alert  CV: RRR,  Pulm: breath sounds equal, lungs clear  MSK: no deformity, moves all extremities  Skin: no rash  Neuro: alert, appropriate conversation and interaction   RUE: 5/5 grasp, 5/5 finger opposition, 5/5 finger adduction, 5/5 wrist flexion, 5/5 wrist extension, 5/5 elbow flexion, 5/5 elbow extension, 5/5 shoulder abduction, sensation intact intact to light tough in radial, median and ulnar nerve distributions  LUE: 5/5 grasp, 5/5 finger opposition, 5/5 finger adduction, 5/5 wrist flexion, 5/5 wrist extension, 5/5 elbow flexion, 5/5 elbow extension, 5/5 shoulder abduction, sensation intact intact to light tough in radial, median and ulnar nerve distributions   Neck-able to initiate motion in all directions  Diagnostics     Lab Results   None    Imaging   CT Cervical Spine w/o Contrast   Final Result   IMPRESSION: Mild degenerative anterolisthesis of C4 upon C5. Alignment of the cervical vertebrae is otherwise normal. Vertebral body heights of the cervical spine are normal. Craniocervical alignment is normal. No fractures. There is loss of disc space    height and degenerative endplate spurring at C5-C6 and C6-C7. Facet arthropathy throughout the cervical spine. No high-grade spinal canal stenosis. No prevertebral soft tissue swelling.      CT Head w/o Contrast   Final Result   IMPRESSION: Normal head CT.          EKG   None    ED Course      Medications Administered   None    Procedures   None     Discussion of Management   None    ED Course   ED Course as " of 01/31/25 1802   Fri Jan 31, 2025   1551 I initially assessed the patient and obtained the history and physical exam.    1730 I rechecked and updated the patient. I believe it is safe to discharge the patient. The patient agrees.        Additional Documentation  None    Medical Decision Making / Diagnosis     CMS Diagnoses: None    MIPS       None    MDM   Tricia Sosa is a 84 year old female presents for atraumatic neck pain.  Primary referred her here for imaging.  CT head negative for intracranial hemorrhage.  CT cervical spine showed multilevel degenerative change however no signs of fracture or malalignment or critical spinal stenosis.  Clinically, symptoms are consistent with degenerative disc disease and arthritic changes in the neck.  No neurologic changes.  Encouraged her to follow-up with PCP and rheumatology.  Tylenol.  For pain.  Discharged home    Disposition   The patient was discharged.     Diagnosis     ICD-10-CM    1. Neck pain  M54.2       2. Degenerative disc disease, cervical  M50.30            Discharge Medications   Discharge Medication List as of 1/31/2025  5:39 PM        START taking these medications    Details   ketoconazole (NIZORAL) 2 % external shampoo Use every other day to scalp as neededDisp-120 mL, [id number]             Scribe Disclosure:  I, Ashleygracia Castano, am serving as a scribe at 3:51 PM on 1/31/2025 to document services personally performed by Shanon Wild MD based on my observations and the provider's statements to me.      Shanon Wild MD  01/31/25 [id number]

## 2025-01-31 NOTE — DISCHARGE INSTRUCTIONS
Take tylenol 1000mg 3x per day as needed for pain.  Do not take more than 0300mg tylenol in 24 hours.

## 2025-02-11 ENCOUNTER — THERAPY VISIT (OUTPATIENT)
Dept: PHYSICAL THERAPY | Facility: CLINIC | Age: 85
End: 2025-02-11
Payer: MEDICARE

## 2025-02-11 DIAGNOSIS — M17.0 OSTEOARTHRITIS OF PATELLOFEMORAL JOINTS OF BOTH KNEES: ICD-10-CM

## 2025-02-11 DIAGNOSIS — M16.11 PRIMARY OSTEOARTHRITIS OF RIGHT HIP: Primary | ICD-10-CM

## 2025-02-11 DIAGNOSIS — M25.562 CHRONIC PAIN OF BOTH KNEES: ICD-10-CM

## 2025-02-11 DIAGNOSIS — M25.561 CHRONIC PAIN OF BOTH KNEES: ICD-10-CM

## 2025-02-11 DIAGNOSIS — G89.29 CHRONIC PAIN OF BOTH KNEES: ICD-10-CM

## 2025-02-11 PROCEDURE — 97530 THERAPEUTIC ACTIVITIES: CPT | Mod: GP | Performed by: PHYSICAL THERAPIST

## 2025-02-11 PROCEDURE — 97110 THERAPEUTIC EXERCISES: CPT | Mod: GP | Performed by: PHYSICAL THERAPIST

## 2025-02-11 NOTE — PROGRESS NOTES
PHYSICAL THERAPY DISCHARGE/PROGRESS NOTE    ASSESSMENT  Patient demonstrates the ability to continue with her HEP independently but continues to be limited in what she is able to do 2/2 continued discomfort in her R hip and B knees.   PLAN  continue with HEP independently; follow up for injection with Dr. Yeo as scheduled  GOALS  Not all goals met due to increased pain, see flowsheet below for details.      02/11/25 0500   Appointment Info   Signing clinician's name / credentials Mahnaz Churchill PT, DPT   Visits Used 8   Medical Diagnosis Hip and knee OA   PT Tx Diagnosis B LE weakness; R>L hip pain; B knee pain   Progress Note/Certification   Start of Care Date 11/12/24   Onset of illness/injury or Date of Surgery 07/01/24   Therapy Frequency 1x every 2 weeks   Predicted Duration 4   Certification date from 01/22/25   Certification date to 02/18/25   GOALS   PT Goals 3   PT Goal 1   Goal Identifier HEP   Goal Description Patient will be consistent and independent with HEP in order to achieve functional goals.   Rationale to maximize safety and independence with performance of ADLs and functional tasks;to maximize safety and independence within the home;to maximize safety and independence within the community;to maximize safety and independence with transportation;to maximize safety and independence with self cares   Goal Progress met   Target Date 12/10/24   Date Met 12/10/24   PT Goal 2   Goal Identifier Walking   Goal Description Patient will report decreased hip and knee pain when standing or walking for at least 15 minutes to allow her increased acess to her home/community.   Rationale to maximize safety and independence with performance of ADLs and functional tasks;to maximize safety and independence within the home;to maximize safety and independence within the community;to maximize safety and independence with transportation;to maximize safety and independence with self cares   Goal Progress met as long as  using walker; unable to complete without use of AD   Target Date 02/18/25   Date Met 01/28/25   PT Goal 3   Goal Identifier Sit to Stand   Goal Description Patient will be able to complete at least 5xSTS without UE support from standard chair to improve functional mobility and decrease fall risk.   Rationale to maximize safety and independence with transportation;to maximize safety and independence with performance of ADLs and functional tasks;to maximize safety and independence with self cares   Goal Progress remains challenging 2/2 weakness and pain   Target Date 02/18/25   Subjective Report   Subjective Report States that she has continued discomfort in her R hip and B knees R>L side. States that she has the synvisc injection scheduled with Dr. Yeo.   Therapeutic Procedure/Exercise   Therapeutic Procedures: strength, endurance, ROM, flexibility minutes (85029) 34   PTRx Ther Proc 1 Clamshell with Theraband   PTRx Ther Proc 1 - Details RTB 2x15 each side- mild discomfort noted with R side working, but able to improve discomfort with repositioning   PTRx Ther Proc 2 Bridging #1   PTRx Ther Proc 2 - Details 3x10 with appropriate muscular challenge   PTRx Ther Proc 3 Supine Abdominal Exercise #3 (Marching)   PTRx Ther Proc 3 - Details reveiwde for proper completion with proper muscular activation   PTRx Ther Proc 4 Hip Flexion Straight Leg Raise   PTRx Ther Proc 4 - Details 2x8 on the R LE and 2x12 on the LE; increased difficulty but able to complete when focusing on activation of her quad and lifting 2-3 inches from the table   PTRx Ther Proc 5 Supine Lumbar Hip Roll   PTRx Ther Proc 5 - Details x20 with decreased ROM in order to improve tolerance with R side   PTRx Ther Proc 6 Short Arc Knee Extension (Long Sitting)   PTRx Ther Proc 6 - Details 2x10 on each side with cues for proper completion and activation wihtout too long of a lever   PTRx Ther Proc 7 Roll Ins Hooklying   PTRx Ther Proc 7 - Details 2x15 with 5  second hold- modified for improved understanding and improved completion   PTRx Ther Proc 8 Toe Raises   PTRx Ther Proc 8 - Details 1x20 then 30 second break then 1x5 gto fatigue   PTRx Ther Proc 9 Standing Hip Abduction   PTRx Ther Proc 9 - Details 2x15 tolerated with upright support for balance   PTRx Ther Proc 10 sit to stand   PTRx Ther Proc 10 - Details x8 needed UE support to stand   Skilled Intervention for improved strength and pain modulation   Patient Response/Progress demonstrates understanding and agreement to continue independently   Therapeutic Activity   Therapeutic Activities: dynamic activities to improve functional performance minutes (86558) 5   Ther Act 1 Continued movement incorporate daily activities   Neuromuscular Re-education   Neuro Re-ed 1 Single Leg Balance   Neuro Re-ed 1 - Details NC   PTRx Neuro Re-ed 1 Marching in Place   PTRx Neuro Re-ed 1 - Details NC   PTRx Neuro Re-ed 2 Weight Shift in Staggered Stance with Chair   PTRx Neuro Re-ed 2 - Details NC   Education   Learner/Method Patient;No Barriers to Learning;Pictures/Video   Education Comments review HEP and update for continued self completion and education related to managemen tPRN   Plan   Home program Ptrx HEP- printouts   Updates to plan of care d/c follow up with Dr. Yeo for continued management   Total Session Time   Timed Code Treatment Minutes 39   Total Treatment Time (sum of timed and untimed services) 39

## 2025-02-17 ENCOUNTER — OFFICE VISIT (OUTPATIENT)
Dept: ORTHOPEDICS | Facility: CLINIC | Age: 85
End: 2025-02-17
Payer: MEDICARE

## 2025-02-17 DIAGNOSIS — M17.0 OSTEOARTHRITIS OF PATELLOFEMORAL JOINTS OF BOTH KNEES: ICD-10-CM

## 2025-02-17 DIAGNOSIS — M16.11 PRIMARY OSTEOARTHRITIS OF RIGHT HIP: Primary | ICD-10-CM

## 2025-02-17 PROCEDURE — 20611 DRAIN/INJ JOINT/BURSA W/US: CPT | Mod: 50 | Performed by: FAMILY MEDICINE

## 2025-02-17 PROCEDURE — 20611 DRAIN/INJ JOINT/BURSA W/US: CPT | Mod: RT | Performed by: FAMILY MEDICINE

## 2025-02-17 RX ORDER — LIDOCAINE HYDROCHLORIDE 10 MG/ML
5 INJECTION, SOLUTION INFILTRATION; PERINEURAL
Status: COMPLETED | OUTPATIENT
Start: 2025-02-17 | End: 2025-02-17

## 2025-02-17 RX ORDER — LIDOCAINE HYDROCHLORIDE 10 MG/ML
8 INJECTION, SOLUTION INFILTRATION; PERINEURAL
Status: COMPLETED | OUTPATIENT
Start: 2025-02-17 | End: 2025-02-17

## 2025-02-17 RX ORDER — ROPIVACAINE HYDROCHLORIDE 5 MG/ML
4 INJECTION, SOLUTION EPIDURAL; INFILTRATION; PERINEURAL
Status: COMPLETED | OUTPATIENT
Start: 2025-02-17 | End: 2025-02-17

## 2025-02-17 RX ORDER — METHYLPREDNISOLONE ACETATE 40 MG/ML
40 INJECTION, SUSPENSION INTRA-ARTICULAR; INTRALESIONAL; INTRAMUSCULAR; SOFT TISSUE
Status: COMPLETED | OUTPATIENT
Start: 2025-02-17 | End: 2025-02-17

## 2025-02-17 RX ADMIN — METHYLPREDNISOLONE ACETATE 40 MG: 40 INJECTION, SUSPENSION INTRA-ARTICULAR; INTRALESIONAL; INTRAMUSCULAR; SOFT TISSUE at 13:20

## 2025-02-17 RX ADMIN — LIDOCAINE HYDROCHLORIDE 8 ML: 10 INJECTION, SOLUTION INFILTRATION; PERINEURAL at 13:20

## 2025-02-17 RX ADMIN — LIDOCAINE HYDROCHLORIDE 5 ML: 10 INJECTION, SOLUTION INFILTRATION; PERINEURAL at 13:19

## 2025-02-17 RX ADMIN — ROPIVACAINE HYDROCHLORIDE 4 ML: 5 INJECTION, SOLUTION EPIDURAL; INFILTRATION; PERINEURAL at 13:20

## 2025-02-17 NOTE — LETTER
2/17/2025      Tricia Sosa  [address] [address] [facility] [id number]      Dear Colleague,    Thank you for referring your patient, Tricia Sosa, to the [facility] [place]. Please see a copy of my visit note below.    ASSESSMENT & PLAN  Patient Instructions     1. Primary osteoarthritis of right hip    2. Osteoarthritis of patellofemoral joints of both knees      -Patient is following for chronic bilateral knee pain and right hip pain due to arthritis  -Patient tolerated right hip intra-articular cortisone injection today without complications.  Patient was given postprocedure instructions   -Patient tolerated bilateral knee intra-articular Synvisc 1 injections today without complications.  Patient was given postprocedure instruction  -Patient will continue physical therapy and home exercise program to building strength in both legs  -Patient states that her right hip pain is worst at nighttime waking her from sleep.  Patient was advised that this may be due to back pain.  If patient does not get relief from her right hip intra-articular cortisone injection, she should mention her hip pain when she sees Dr. Reza  -Call direct clinic number [[phone]] at any time with questions or concerns.    Albert Yeo MD [facility]  [facility]        -----    SUBJECTIVE:  Tricia Sosa is a 84 year old female who is seen for bilateral knee Synvisc One injections and right intra articular hip steroid injection. Patient was last seen on 01/06/25 and had bilateral knee steroid injections performed by Dr. Cardoso. Patient reports getting no noticeable relief.        Large Joint Injection/Arthocentesis: bilateral knee    Date/Time: 2/17/2025 1:19 PM    Performed by: Yeo, Albert, MD  Authorized by: Yeo, Albert, MD    Indications:  Pain and osteoarthritis  Needle Size:  22 G  Guidance: ultrasound    Approach:  Superolateral  Location:   Knee  Laterality:  Bilateral      Medications (Right):  48 mg hylan 48 MG/6ML; 5 mL lidocaine 1 %  Aspirate amount (mL):  19  Aspirate:  Yellow  Medications (Left):  48 mg hylan 48 MG/6ML; 5 mL lidocaine 1 %  Aspirate amount (mL):  15  Aspirate:  Yellow  Outcome:  Tolerated well, no immediate complications  Procedure discussed: discussed risks, benefits, and alternatives    Consent Given by:  Patient  Timeout: timeout called immediately prior to procedure    Prep: patient was prepped and draped in usual sterile fashion     Ultrasound was used to ensure safe and accurate needle placement and injection. Ultrasound images of the procedure were permanently stored.    Large Joint Injection/Arthocentesis: R hip joint    Date/Time: 2/17/2025 1:20 PM    Performed by: Yeo, Albert, MD  Authorized by: Yeo, Albert, MD    Indications:  Pain and osteoarthritis  Needle Size:  22 G  Guidance: ultrasound    Approach:  Anterior  Location:  Hip      Site:  R hip joint  Medications:  40 mg methylPREDNISolone 40 MG/ML; 8 mL lidocaine 1 %; 4 mL ROPivacaine 5 MG/ML  Outcome:  Tolerated well, no immediate complications  Procedure discussed: discussed risks, benefits, and alternatives    Consent Given by:  Patient  Timeout: timeout called immediately prior to procedure    Prep: patient was prepped and draped in usual sterile fashion     Ultrasound was used to ensure safe and accurate needle placement and injection. Ultrasound images of the procedure were permanently stored.      Albert Yeo MD, [facility]      Again, thank you for allowing me to participate in the care of your patient.        Sincerely,        Albert Yeo, MD    Electronically signed

## 2025-02-17 NOTE — PATIENT INSTRUCTIONS
1. Primary osteoarthritis of right hip    2. Osteoarthritis of patellofemoral joints of both knees      -Patient is following for chronic bilateral knee pain and right hip pain due to arthritis  -Patient tolerated right hip intra-articular cortisone injection today without complications.  Patient was given postprocedure instructions   -Patient tolerated bilateral knee intra-articular Synvisc 1 injections today without complications.  Patient was given postprocedure instruction  -Patient will continue physical therapy and home exercise program to building strength in both legs  -Patient states that her right hip pain is worst at nighttime waking her from sleep.  Patient was advised that this may be due to back pain.  If patient does not get relief from her right hip intra-articular cortisone injection, she should mention her hip pain when she sees Dr. Reza  -Call direct clinic number [[phone]] at any time with questions or concerns.    Albert Yeo MD [facility]  [facility] [place]

## 2025-02-17 NOTE — PROGRESS NOTES
ASSESSMENT & PLAN  Patient Instructions     1. Primary osteoarthritis of right hip    2. Osteoarthritis of patellofemoral joints of both knees      -Patient is following for chronic bilateral knee pain and right hip pain due to arthritis  -Patient tolerated right hip intra-articular cortisone injection today without complications.  Patient was given postprocedure instructions   -Patient tolerated bilateral knee intra-articular Synvisc 1 injections today without complications.  Patient was given postprocedure instruction  -Patient will continue physical therapy and home exercise program to building strength in both legs  -Patient states that her right hip pain is worst at nighttime waking her from sleep.  Patient was advised that this may be due to back pain.  If patient does not get relief from her right hip intra-articular cortisone injection, she should mention her hip pain when she sees Dr. Reza  -Call direct clinic number [[phone]] at any time with questions or concerns.    Albert Yeo MD [facility]  [place] [facility]  [facility]        -----    SUBJECTIVE:  Tricia Sosa is a 84 year old female who is seen for bilateral knee Synvisc One injections and right intra articular hip steroid injection. Patient was last seen on 01/06/25 and had bilateral knee steroid injections performed by Dr. Cardoso. Patient reports getting no noticeable relief.        Large Joint Injection/Arthocentesis: bilateral knee    Date/Time: 2/17/2025 1:19 PM    Performed by: Yeo, Albert, MD  Authorized by: Yeo, Albert, MD    Indications:  Pain and osteoarthritis  Needle Size:  22 G  Guidance: ultrasound    Approach:  Superolateral  Location:  Knee  Laterality:  Bilateral      Medications (Right):  48 mg hylan 48 MG/6ML; 5 mL lidocaine 1 %  Aspirate amount (mL):  19  Aspirate:  Yellow  Medications (Left):  48 mg hylan 48 MG/6ML; 5 mL lidocaine 1 %  Aspirate amount (mL):  15  Aspirate:   Yellow  Outcome:  Tolerated well, no immediate complications  Procedure discussed: discussed risks, benefits, and alternatives    Consent Given by:  Patient  Timeout: timeout called immediately prior to procedure    Prep: patient was prepped and draped in usual sterile fashion     Ultrasound was used to ensure safe and accurate needle placement and injection. Ultrasound images of the procedure were permanently stored.    Large Joint Injection/Arthocentesis: R hip joint    Date/Time: 2/17/2025 1:20 PM    Performed by: Yeo, Albert, MD  Authorized by: Yeo, Albert, MD    Indications:  Pain and osteoarthritis  Needle Size:  22 G  Guidance: ultrasound    Approach:  Anterior  Location:  Hip      Site:  R hip joint  Medications:  40 mg methylPREDNISolone 40 MG/ML; 8 mL lidocaine 1 %; 4 mL ROPivacaine 5 MG/ML  Outcome:  Tolerated well, no immediate complications  Procedure discussed: discussed risks, benefits, and alternatives    Consent Given by:  Patient  Timeout: timeout called immediately prior to procedure    Prep: patient was prepped and draped in usual sterile fashion     Ultrasound was used to ensure safe and accurate needle placement and injection. Ultrasound images of the procedure were permanently stored.      Albert Yeo MD, [facility]

## 2025-02-18 NOTE — PROGRESS NOTES
Virtual Visit Details    Type of service:  Telephone Visit    Phone call duration: 30  minutes   Originating Location (pt. Location): Home    Distant Location (provider location):  On-site  Telephone visit completed due to the patient did not have access to video, while the distant provider did.                              Assessment & Plan  CVID  AIHA  Seronegative rheumatoid arthritis  ED visit last month for neck pain     IV IgG and Rituximab today  Next provider visit in 2 months     Interval History  This is a scheduled follow up visit for this lady who previously has been seen by the [facility] (2004).  She had onset of AIHA in 2015 for which she was seen by Domi Matos, Melina, and Aleksey for CVID, IgA deficiency and AIHA.  She's currently getting monthly rituximab (which controls her AIHA) and every two months IV IgG.  Her last hemolytic episode was 2023.    She was ill a few months ago and her weight dropped from ~155# to ~130#.  She's now regained some of that.  She does have ongoing problems with fatigue and weakness (has trouble getting out of a chair, needs to use a walker.     She recently had repeat injections of knees (gel) and right hip (steroids) because of worse arthritis in her knees, and hips.      She is followed by Amy Sosa MD (rheumatology) with last note from 14 January 2025:  1.  Seronegative rheumatoid arthritis.  She was started on methotrexate last month which she is tolerating well.  We will update her labs today and if they are stable she will INCREASE METHOTREXATE TO 15 MG WEEKLY (written instructions provided to the patient).  She will stay on 2.5 mg prednisone, a week prior to her next visit she will discontinue prednisone to see if methotrexate can keep the pain and swelling in her hands and wrists under control.  2.  She also has osteoarthritis of the knee joints (advanced patellofemoral, moderate lateral compartment, also osteoarthritis of the right hip for which she is following  with orthopedics.  Synvisc injections are being planned for her knee osteoarthritis by orthopedics.     She tells me that her rheumatologist is not interested in having her get rituximab more frequently (he feels methotrexate is safer than rituximab).  She does have a visit this afternoon for another infusion of rituximab.  She and I agreed not to change her management for now.     ECOG Peformance Status  2 - Ambulatory and capable of self care but unable to carry out roverto work activities, up and about more than 50% of waking hours     Patient Active Problem List   Diagnosis    Lymphocytic colitis    Acquired hypothyroidism    CVID (common variable immunodeficiency) (H)    B12 deficiency    CARDIOVASCULAR SCREENING; LDL GOAL LESS THAN 130    Other autoimmune hemolytic anemia (H)    Right-sided low back pain with right-sided sciatica, unspecified chronicity    Autoimmune hemolytic anemia (H)    Bronchiectasis (H)    Mild obstructive sleep apnea    Selective deficiency of IgG subclasses (H)    Anemia, iron deficiency    Mild protein-calorie malnutrition    Ataxia, unspecified    DDD (degenerative disc disease), lumbar    Diarrhea    Other acquired deformities of left foot    Other acquired deformities of right foot    Other amnesia    Other disturbances of skin sensation    Hand arthritis    Bilateral hand pain     Current Outpatient Medications   Medication Sig Dispense Refill    cyanocobalamin (VITAMIN  B-12) 1000 MCG tablet   3    folic acid (FOLVITE) 1 MG tablet   3    hydroCHLOROthiazide 12.5 MG tablet TAKE 1 TABLET AS NEEDED FOR EDEMA 30 tablet 2    ketoconazole (NIZORAL) 2 % external shampoo Use every other day to scalp as needed 120 mL 0    levothyroxine (SYNTHROID/LEVOTHROID) 150 MCG tablet Take 1 tablet (150 mcg) by mouth daily 90 tablet 3    methotrexate 2.5 MG tablet Take 6 tablets (15 mg) by mouth every 7 days. 42 tablet 0    omega 3 1000 MG CAPS Take 1,280 mg by mouth daily.      predniSONE (DELTASONE) 5  MG tablet Take 1 tablet (5 mg) by mouth 2 times daily. (Patient not taking: Reported on 2/7/2025) 60 tablet 1    triamcinolone (KENALOG) 0.1 % external ointment Apply topically 2 times daily       No current facility-administered medications for this visit.     Facility-Administered Medications Ordered in Other Visits   Medication Dose Route Frequency Provider Last Rate Last Admin    sodium chloride 0.9% BOLUS 250 mL  250 mL Intravenous Once Isaiah Man PA         Past Medical History:   Diagnosis Date    Arthritis     WARD (dyspnea on exertion)     External hemorrhoids without mention of complication 06/27/2005    Hemolytic anemia     autoimmune    Hypothyroidism     IgA deficiency (H)     Myopia of both eyes with astigmatism and presbyopia 08/16/2017    Other malaise and fatigue     Other severe protein-calorie malnutrition     Other vitreous opacities 12/19/2006    Sleep apnea     Thyroid disease     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    Hemorrhage Subdural Trauma Without LOC Active    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, and posterior vitreous detachment    Urinary tract infection, site not specified     Recurrent UTI's     Past Surgical History:   Procedure Laterality Date    BIOPSY MUSCLE DIAGNOSTIC (LOCATION) Right 1/16/2024    Procedure: BIOPSY, MUSCLE - RIGHT THIGH;  Surgeon: Dickson Madrid MD;  Location:  OR    COLONOSCOPY N/A 4/26/2016    Procedure: COLONOSCOPY;  Surgeon: Dontae Del Rio MD;  Location:  GI    COLONOSCOPY N/A 2/22/2024    Procedure: Colonoscopy with polypectomy by using cold snare and two hemoclips applied;  Surgeon: Dontae Del Rio MD;  Location:  GI    ENT SURGERY  1985    Thyroid lobectomy    ESOPHAGOSCOPY, GASTROSCOPY, DUODENOSCOPY (EGD), COMBINED N/A 2/22/2024    Procedure: ESOPHAGOGASTRODUODENOSCOPY, WITH BIOPSY by using cold forcep;  Surgeon: Dontae Del Rio MD;  Location:  GI    EYE SURGERY  Bilateral 04/20/2021    Cataract Surgery     CYSTOSCOPY,INSERT URETERAL STENT      Ureteral stent insertion    HC FLEX SIGMOIDOSCOPY W BIOPSY  5/30/00     LAPAROSCOPY, SURGICAL; CHOLECYSTECTOMY  1992     THYROIDECTOMY      LIGATION OF HEMORRHOID(S)      Hemorrhoidectomy    UPPER GI ENDOSCOPY,BIOPSY  10/01/01    ZC LIGATE FALLOPIAN TUBE      [facility] COLONOSCOPY W BIOPSY  4/26/00    [facility] COLONOSCOPY W BIOPSY  10/8/03    [facility] COLONOSCOPY W BIOPSY  6/27/05    REPEAT IN 5 YEARS     Social History     Socioeconomic History    Marital status:      Spouse name: Not on file    Number of children: Not on file    Years of education: Not on file    Highest education level: Not on file   Occupational History    Not on file   Tobacco Use    Smoking status: Never     Passive exposure: Never    Smokeless tobacco: Never   Vaping Use    Vaping status: Never Used   Substance and Sexual Activity    Alcohol use: Not Currently     Comment: 1 a day at most    Drug use: No    Sexual activity: Not Currently     Partners: Male   Other Topics Concern    Parent/sibling w/ CABG, MI or angioplasty before 65F 55M? No   Social History Narrative    Not on file     Social Drivers of Health     Financial Resource Strain: Low Risk  (1/5/2024)    Financial Resource Strain     Within the past 12 months, have you or your family members you live with been unable to get utilities (heat, electricity) when it was really needed?: No   Food Insecurity: Low Risk  (1/5/2024)    Food Insecurity     Within the past 12 months, did you worry that your food would run out before you got money to buy more?: No     Within the past 12 months, did the food you bought just not last and you didn t have money to get more?: No   Transportation Needs: Low Risk  (1/5/2024)    Transportation Needs     Within the past 12 months, has lack of transportation kept you from medical appointments, getting your medicines, non-medical meetings or appointments, work, or from  "getting things that you need?: No   Physical Activity: Not on file   Stress: Not on file   Social Connections: Not on file   Interpersonal Safety: Low Risk  (10/24/2024)    Interpersonal Safety     Do you feel physically and emotionally safe where you currently live?: Yes     Within the past 12 months, have you been hit, slapped, kicked or otherwise physically hurt by someone?: No     Within the past 12 months, have you been humiliated or emotionally abused in other ways by your partner or ex-partner?: No   Housing Stability: Low Risk  (1/5/2024)    Housing Stability     Do you have housing? : Yes     Are you worried about losing your housing?: No     Review of Systems   Constitutional:  Positive for fatigue.   HENT:  Negative.     Eyes: Negative.    Respiratory: Negative.     Cardiovascular: Negative.    Gastrointestinal: Negative.         Several poops every morning, not diarrhea, not changing   Endocrine: Negative.    Genitourinary: Negative.     Musculoskeletal:  Positive for arthralgias, back pain, gait problem and neck pain.   Neurological:  Positive for gait problem.   Hematological: Negative.    Psychiatric/Behavioral:  Positive for sleep disturbance (naps every day).    All other systems reviewed and are negative.      Ht 1.803 m (5' 11\")   Wt 63.5 kg (140 lb)   LMP  (LMP Unknown)   BMI 19.53 kg/m      GENERAL: alert and no distress  RESP: No audible wheeze, cough, or visible cyanosis.    NEURO: Cranial nerves grossly intact.  Mentation and speech appropriate for age.  PSYCH: Appropriate affect, tone, and pace of words    Recent Results (from the past 720 hours)   IgG    Collection Time: 02/20/25  1:30 PM   Result Value Ref Range    Immunoglobulin G 419 (L) 610 - 1,616 mg/dL   CBC with platelets and differential    Collection Time: 02/20/25  1:30 PM   Result Value Ref Range    WBC Count 7.2 4.0 - 11.0 10e3/uL    RBC Count 3.23 (L) 3.80 - 5.20 10e6/uL    Hemoglobin 11.3 (L) 11.7 - 15.7 g/dL    Hematocrit " 33.8 (L) 35.0 - 47.0 %     (H) 78 - 100 fL    MCH 35.0 (H) 26.5 - 33.0 pg    MCHC 33.4 31.5 - 36.5 g/dL    RDW 14.8 10.0 - 15.0 %    Platelet Count 220 150 - 450 10e3/uL    % Neutrophils 55 %    % Lymphocytes 32 %    % Monocytes 12 %    % Eosinophils 0 %    % Basophils 0 %    % Immature Granulocytes 0 %    NRBCs per 100 WBC 0 <1 /100    Absolute Neutrophils 4.0 1.6 - 8.3 10e3/uL    Absolute Lymphocytes 2.3 0.8 - 5.3 10e3/uL    Absolute Monocytes 0.9 0.0 - 1.3 10e3/uL    Absolute Eosinophils 0.0 0.0 - 0.7 10e3/uL    Absolute Basophils 0.0 0.0 - 0.2 10e3/uL    Absolute Immature Granulocytes 0.0 <=0.4 10e3/uL    Absolute NRBCs 0.0 10e3/uL     Recent Results (from the past 744 hours)   CT Head w/o Contrast    Narrative    EXAM: CT HEAD W/O CONTRAST  LOCATION: [facility]  DATE: 1/31/2025    INDICATION: Headaches, fall last June, prior SDH.  COMPARISON: None available  TECHNIQUE: Routine CT Head without IV contrast. Multiplanar reformats. Dose reduction techniques were used.    FINDINGS:  INTRACRANIAL CONTENTS: No intracranial hemorrhage, extraaxial collection, or mass effect.  No CT evidence of acute infarct. Normal parenchymal attenuation. Normal ventricles and sulci.     VISUALIZED ORBITS/SINUSES/MASTOIDS: No intraorbital abnormality. No paranasal sinus mucosal disease. No middle ear or mastoid effusion.    BONES/SOFT TISSUES: No acute abnormality.      Impression    IMPRESSION: Normal head CT.   CT Cervical Spine w/o Contrast    Narrative    EXAM: CT CERVICAL SPINE W/O CONTRAST  LOCATION: [facility]  DATE: 1/31/2025    INDICATION: Trauma, remote last June, neck pain.  COMPARISON: None.  TECHNIQUE: Routine CT Cervical Spine without IV contrast. Multiplanar reformats. Dose reduction techniques were used.      Impression    IMPRESSION: Mild degenerative anterolisthesis of C4 upon C5. Alignment of the cervical vertebrae is otherwise normal. Vertebral body heights of the  cervical spine are normal. Craniocervical alignment is normal. No fractures. There is loss of disc space   height and degenerative endplate spurring at C5-C6 and C6-C7. Facet arthropathy throughout the cervical spine. No high-grade spinal canal stenosis. No prevertebral soft tissue swelling.

## 2025-02-20 ENCOUNTER — LAB (OUTPATIENT)
Dept: ONCOLOGY | Facility: CLINIC | Age: 85
End: 2025-02-20
Attending: PHYSICIAN ASSISTANT
Payer: MEDICARE

## 2025-02-20 DIAGNOSIS — D80.3 SELECTIVE DEFICIENCY OF IGG SUBCLASSES (H): ICD-10-CM

## 2025-02-20 DIAGNOSIS — D83.9 CVID (COMMON VARIABLE IMMUNODEFICIENCY) (H): ICD-10-CM

## 2025-02-20 DIAGNOSIS — D59.10 AUTOIMMUNE HEMOLYTIC ANEMIA (H): ICD-10-CM

## 2025-02-20 LAB
BASOPHILS # BLD AUTO: 0 10E3/UL (ref 0–0.2)
BASOPHILS NFR BLD AUTO: 0 %
EOSINOPHIL # BLD AUTO: 0 10E3/UL (ref 0–0.7)
EOSINOPHIL NFR BLD AUTO: 0 %
ERYTHROCYTE [DISTWIDTH] IN BLOOD BY AUTOMATED COUNT: 14.8 % (ref 10–15)
HCT VFR BLD AUTO: 33.8 % (ref 35–47)
HGB BLD-MCNC: 11.3 G/DL (ref 11.7–15.7)
IMM GRANULOCYTES # BLD: 0 10E3/UL
IMM GRANULOCYTES NFR BLD: 0 %
LYMPHOCYTES # BLD AUTO: 2.3 10E3/UL (ref 0.8–5.3)
LYMPHOCYTES NFR BLD AUTO: 32 %
MCH RBC QN AUTO: 35 PG (ref 26.5–33)
MCHC RBC AUTO-ENTMCNC: 33.4 G/DL (ref 31.5–36.5)
MCV RBC AUTO: 105 FL (ref 78–100)
MONOCYTES # BLD AUTO: 0.9 10E3/UL (ref 0–1.3)
MONOCYTES NFR BLD AUTO: 12 %
NEUTROPHILS # BLD AUTO: 4 10E3/UL (ref 1.6–8.3)
NEUTROPHILS NFR BLD AUTO: 55 %
NRBC # BLD AUTO: 0 10E3/UL
NRBC BLD AUTO-RTO: 0 /100
PLATELET # BLD AUTO: 220 10E3/UL (ref 150–450)
RBC # BLD AUTO: 3.23 10E6/UL (ref 3.8–5.2)
WBC # BLD AUTO: 7.2 10E3/UL (ref 4–11)

## 2025-02-20 PROCEDURE — 85018 HEMOGLOBIN: CPT | Performed by: INTERNAL MEDICINE

## 2025-02-20 PROCEDURE — 82784 ASSAY IGA/IGD/IGG/IGM EACH: CPT | Performed by: INTERNAL MEDICINE

## 2025-02-20 PROCEDURE — 85049 AUTOMATED PLATELET COUNT: CPT | Performed by: INTERNAL MEDICINE

## 2025-02-20 PROCEDURE — 85004 AUTOMATED DIFF WBC COUNT: CPT | Performed by: INTERNAL MEDICINE

## 2025-02-20 NOTE — PROGRESS NOTES
Medical Assistant Note:  Tricia Sosa presents today for blood draw.    Patient seen by provider today: No.   present during visit today: Not Applicable.    Concerns: No Concerns.    Procedure:  Lab draw site: right antecub, Needle type: butterfly, Gauge: 23.    Post Assessment:  Labs drawn without difficulty: Yes.    Discharge Plan:  Departure Mode: Ambulatory.    Face to Face Time: 10 minutes.    Hawa Victor MA

## 2025-02-24 ENCOUNTER — INFUSION THERAPY VISIT (OUTPATIENT)
Dept: INFUSION THERAPY | Facility: CLINIC | Age: 85
End: 2025-02-24
Attending: INTERNAL MEDICINE
Payer: MEDICARE

## 2025-02-24 ENCOUNTER — VIRTUAL VISIT (OUTPATIENT)
Dept: ONCOLOGY | Facility: CLINIC | Age: 85
End: 2025-02-24
Attending: INTERNAL MEDICINE
Payer: MEDICARE

## 2025-02-24 VITALS
BODY MASS INDEX: 19.97 KG/M2 | TEMPERATURE: 98.6 F | OXYGEN SATURATION: 96 % | SYSTOLIC BLOOD PRESSURE: 133 MMHG | HEART RATE: 77 BPM | WEIGHT: 143.2 LBS | DIASTOLIC BLOOD PRESSURE: 72 MMHG

## 2025-02-24 VITALS — WEIGHT: 140 LBS | HEIGHT: 71 IN | BODY MASS INDEX: 19.6 KG/M2

## 2025-02-24 DIAGNOSIS — D59.10 AUTOIMMUNE HEMOLYTIC ANEMIA (H): Primary | ICD-10-CM

## 2025-02-24 DIAGNOSIS — D59.19 OTHER AUTOIMMUNE HEMOLYTIC ANEMIA (H): ICD-10-CM

## 2025-02-24 DIAGNOSIS — D83.9 CVID (COMMON VARIABLE IMMUNODEFICIENCY) (H): ICD-10-CM

## 2025-02-24 DIAGNOSIS — D80.3 SELECTIVE DEFICIENCY OF IGG SUBCLASSES (H): Primary | ICD-10-CM

## 2025-02-24 DIAGNOSIS — D59.10 AUTOIMMUNE HEMOLYTIC ANEMIA (H): ICD-10-CM

## 2025-02-24 PROCEDURE — 96415 CHEMO IV INFUSION ADDL HR: CPT

## 2025-02-24 PROCEDURE — 96413 CHEMO IV INFUSION 1 HR: CPT

## 2025-02-24 PROCEDURE — 98014 SYNCH AUDIO-ONLY EST MOD 30: CPT | Performed by: INTERNAL MEDICINE

## 2025-02-24 PROCEDURE — 250N000011 HC RX IP 250 OP 636: Mod: JZ | Performed by: INTERNAL MEDICINE

## 2025-02-24 PROCEDURE — 258N000003 HC RX IP 258 OP 636: Performed by: INTERNAL MEDICINE

## 2025-02-24 PROCEDURE — G2211 COMPLEX E/M VISIT ADD ON: HCPCS | Performed by: INTERNAL MEDICINE

## 2025-02-24 PROCEDURE — 96375 TX/PRO/DX INJ NEW DRUG ADDON: CPT

## 2025-02-24 PROCEDURE — 250N000011 HC RX IP 250 OP 636: Mod: JZ | Performed by: PHYSICIAN ASSISTANT

## 2025-02-24 PROCEDURE — 96367 TX/PROPH/DG ADDL SEQ IV INF: CPT

## 2025-02-24 PROCEDURE — 96366 THER/PROPH/DIAG IV INF ADDON: CPT

## 2025-02-24 PROCEDURE — 250N000013 HC RX MED GY IP 250 OP 250 PS 637: Performed by: INTERNAL MEDICINE

## 2025-02-24 RX ORDER — METHYLPREDNISOLONE SODIUM SUCCINATE 125 MG/2ML
125 INJECTION INTRAMUSCULAR; INTRAVENOUS
Status: CANCELLED
Start: 2025-02-24

## 2025-02-24 RX ORDER — DIPHENHYDRAMINE HCL 25 MG
50 CAPSULE ORAL ONCE
Status: COMPLETED | OUTPATIENT
Start: 2025-02-24 | End: 2025-02-24

## 2025-02-24 RX ORDER — METHYLPREDNISOLONE SODIUM SUCCINATE 40 MG/ML
40 INJECTION INTRAMUSCULAR; INTRAVENOUS
Start: 2025-02-25

## 2025-02-24 RX ORDER — DIPHENHYDRAMINE HYDROCHLORIDE 50 MG/ML
50 INJECTION INTRAMUSCULAR; INTRAVENOUS
Start: 2025-02-25

## 2025-02-24 RX ORDER — DIPHENHYDRAMINE HYDROCHLORIDE 50 MG/ML
25 INJECTION INTRAMUSCULAR; INTRAVENOUS ONCE
Status: CANCELLED
Start: 2025-02-24 | End: 2025-02-24

## 2025-02-24 RX ORDER — ALBUTEROL SULFATE 90 UG/1
1-2 INHALANT RESPIRATORY (INHALATION)
Start: 2025-02-25

## 2025-02-24 RX ORDER — HEPARIN SODIUM,PORCINE 10 UNIT/ML
5 VIAL (ML) INTRAVENOUS
OUTPATIENT
Start: 2025-02-25

## 2025-02-24 RX ORDER — MEPERIDINE HYDROCHLORIDE 25 MG/ML
25 INJECTION INTRAMUSCULAR; INTRAVENOUS; SUBCUTANEOUS
Status: CANCELLED | OUTPATIENT
Start: 2025-02-24

## 2025-02-24 RX ORDER — MEPERIDINE HYDROCHLORIDE 25 MG/ML
25 INJECTION INTRAMUSCULAR; INTRAVENOUS; SUBCUTANEOUS
Status: CANCELLED
Start: 2025-02-24

## 2025-02-24 RX ORDER — EPINEPHRINE 1 MG/ML
0.3 INJECTION, SOLUTION INTRAMUSCULAR; SUBCUTANEOUS EVERY 5 MIN PRN
OUTPATIENT
Start: 2025-02-25

## 2025-02-24 RX ORDER — ALBUTEROL SULFATE 90 UG/1
1-2 INHALANT RESPIRATORY (INHALATION)
Status: CANCELLED
Start: 2025-02-24

## 2025-02-24 RX ORDER — HEPARIN SODIUM,PORCINE 10 UNIT/ML
5 VIAL (ML) INTRAVENOUS
Status: CANCELLED | OUTPATIENT
Start: 2025-02-24

## 2025-02-24 RX ORDER — ACETAMINOPHEN 325 MG/1
650 TABLET ORAL ONCE
Status: COMPLETED | OUTPATIENT
Start: 2025-02-24 | End: 2025-02-24

## 2025-02-24 RX ORDER — HEPARIN SODIUM (PORCINE) LOCK FLUSH IV SOLN 100 UNIT/ML 100 UNIT/ML
5 SOLUTION INTRAVENOUS
OUTPATIENT
Start: 2025-02-25

## 2025-02-24 RX ORDER — METHYLPREDNISOLONE SODIUM SUCCINATE 40 MG/ML
20 INJECTION INTRAMUSCULAR; INTRAVENOUS ONCE
Status: COMPLETED | OUTPATIENT
Start: 2025-02-24 | End: 2025-02-24

## 2025-02-24 RX ORDER — METHYLPREDNISOLONE SODIUM SUCCINATE 40 MG/ML
40 INJECTION INTRAMUSCULAR; INTRAVENOUS
Status: CANCELLED
Start: 2025-02-24

## 2025-02-24 RX ORDER — DIPHENHYDRAMINE HYDROCHLORIDE 50 MG/ML
50 INJECTION INTRAMUSCULAR; INTRAVENOUS
Status: CANCELLED
Start: 2025-02-24

## 2025-02-24 RX ORDER — EPINEPHRINE 1 MG/ML
0.3 INJECTION, SOLUTION INTRAMUSCULAR; SUBCUTANEOUS EVERY 5 MIN PRN
Status: CANCELLED | OUTPATIENT
Start: 2025-02-24

## 2025-02-24 RX ORDER — ALBUTEROL SULFATE 0.83 MG/ML
2.5 SOLUTION RESPIRATORY (INHALATION)
Status: CANCELLED | OUTPATIENT
Start: 2025-02-24

## 2025-02-24 RX ORDER — DIPHENHYDRAMINE HCL 25 MG
50 CAPSULE ORAL ONCE
Status: CANCELLED | OUTPATIENT
Start: 2025-02-24

## 2025-02-24 RX ORDER — DIPHENHYDRAMINE HYDROCHLORIDE 50 MG/ML
25 INJECTION INTRAMUSCULAR; INTRAVENOUS
Start: 2025-02-25

## 2025-02-24 RX ORDER — DIPHENHYDRAMINE HYDROCHLORIDE 50 MG/ML
25 INJECTION INTRAMUSCULAR; INTRAVENOUS
Status: CANCELLED
Start: 2025-02-24

## 2025-02-24 RX ORDER — HEPARIN SODIUM (PORCINE) LOCK FLUSH IV SOLN 100 UNIT/ML 100 UNIT/ML
5 SOLUTION INTRAVENOUS
Status: CANCELLED | OUTPATIENT
Start: 2025-02-24

## 2025-02-24 RX ORDER — ACETAMINOPHEN 325 MG/1
650 TABLET ORAL ONCE
Status: CANCELLED | OUTPATIENT
Start: 2025-02-24

## 2025-02-24 RX ORDER — METHYLPREDNISOLONE SODIUM SUCCINATE 40 MG/ML
20 INJECTION INTRAMUSCULAR; INTRAVENOUS ONCE
Start: 2025-02-25 | End: 2025-02-25

## 2025-02-24 RX ORDER — ALBUTEROL SULFATE 0.83 MG/ML
2.5 SOLUTION RESPIRATORY (INHALATION)
OUTPATIENT
Start: 2025-02-25

## 2025-02-24 RX ORDER — MEPERIDINE HYDROCHLORIDE 25 MG/ML
25 INJECTION INTRAMUSCULAR; INTRAVENOUS; SUBCUTANEOUS
OUTPATIENT
Start: 2025-02-25

## 2025-02-24 RX ADMIN — DIPHENHYDRAMINE HYDROCHLORIDE 25 MG: 25 CAPSULE ORAL at 11:56

## 2025-02-24 RX ADMIN — HUMAN IMMUNOGLOBULIN G 35 G: 20 LIQUID INTRAVENOUS at 11:23

## 2025-02-24 RX ADMIN — METHYLPREDNISOLONE SODIUM SUCCINATE 20 MG: 40 INJECTION, POWDER, FOR SOLUTION INTRAMUSCULAR; INTRAVENOUS at 11:19

## 2025-02-24 RX ADMIN — RITUXIMAB-ABBS 700 MG: 10 INJECTION, SOLUTION INTRAVENOUS at 13:49

## 2025-02-24 RX ADMIN — ACETAMINOPHEN 325 MG: 325 TABLET ORAL at 11:56

## 2025-02-24 ASSESSMENT — ENCOUNTER SYMPTOMS
EYES NEGATIVE: 1
FATIGUE: 1
RESPIRATORY NEGATIVE: 1
SLEEP DISTURBANCE: 1
GASTROINTESTINAL NEGATIVE: 1
ARTHRALGIAS: 1
CARDIOVASCULAR NEGATIVE: 1
NECK PAIN: 1
BACK PAIN: 1
ENDOCRINE NEGATIVE: 1
HEMATOLOGIC/LYMPHATIC NEGATIVE: 1

## 2025-02-24 ASSESSMENT — PAIN SCALES - GENERAL
PAINLEVEL_OUTOF10: MILD PAIN (3)
PAINLEVEL_OUTOF10: MILD PAIN (3)

## 2025-02-24 NOTE — NURSING NOTE
Patient reviewed medications and allergies in Mychart during e-check in and said everything looked correct.      Current patient location: [id number] [place] [id number]    Is the patient currently in the state of [place]? YES    Visit mode: TELEPHONE    If the visit is dropped, the patient can be reconnected by:TELEPHONE VISIT: Phone number: [phone]    Will anyone else be joining the visit? NO  (If patient encounters technical issues they should call [phone]    Are changes needed to the allergy or medication list? Pt stated no med changes    Are refills needed on medications prescribed by this physician? NO    Rooming Documentation:  Not applicable    Reason for visit: ROBERT LEMOSF

## 2025-02-24 NOTE — PROGRESS NOTES
Infusion Nursing Note:  Tricia Sosa presents today for IVIG and Rituximab.    Patient seen by provider today: Yes, Dr. Ramos video visit   present during visit today: Not Applicable.    Note: Premedicated with solumedrol 20mg prior to IVIG and 325 tylenol and 25 benadryl prior to rituximab.    She denies any new symptoms, reactions, fever/elevated temperature, illness, recent major or local infection, being on antibiotics, productive cough, or recent surgery.    IVIG infusion initiated at 0.5 ml/kg/hr and increased by 0.5 ml/kg/hr every 15 minutes to max rate of 4 ml/kg/hr.     Intravenous Access:  Peripheral IV placed.    Treatment Conditions:   Latest Reference Range & Units 02/20/25 13:30    - 1,616 mg/dL 419 (L)   (L): Data is abnormally low    Post Infusion Assessment:  Patient tolerated infusion without incident.  Blood return noted pre and post infusion.  Site patent and intact, free from redness, edema or discomfort.  No evidence of extravasations.  Access discontinued per protocol.       Discharge Plan:   Discharge instructions reviewed with: Patient.  Patient and/or family verbalized understanding of discharge instructions and all questions answered.  AVS to patient via [employer].  Patient will return in 8 wks for next appointment.   Patient discharged in stable condition accompanied by: self.  Departure Mode: Ambulatory.      Rosy Nichole RN

## 2025-02-24 NOTE — LETTER
2/24/2025      Tricia Sosa  [id number]  [facility] [address]      Dear Colleague,    Thank you for referring your patient, Tricia Sosa, to the [facility] [place]. Please see a copy of my visit note below.    Virtual Visit Details    Type of service:  Telephone Visit    Phone call duration: 30  minutes   Originating Location (pt. Location): Home    Distant Location (provider location):  On-site  Telephone visit completed due to the patient did not have access to video, while the distant provider did.                              Assessment & Plan  CVID  AIHA  Seronegative rheumatoid arthritis  ED visit last month for neck pain     IV IgG and Rituximab today  Next provider visit in 2 months     Interval History  This is a scheduled follow up visit for this lady who previously has been seen by the [facility] (2004).  She had onset of AIHA in 2015 for which she was seen by Domi Matos, Melina, and Aleksey for CVID, IgA deficiency and AIHA.  She's currently getting monthly rituximab (which controls her AIHA) and every two months IV IgG.  Her last hemolytic episode was 2023.    She was ill a few months ago and her weight dropped from ~155# to ~130#.  She's now regained some of that.  She does have ongoing problems with fatigue and weakness (has trouble getting out of a chair, needs to use a walker.     She recently had repeat injections of knees (gel) and right hip (steroids) because of worse arthritis in her knees, and hips.      She is followed by Amy Sosa MD (rheumatology) with last note from 14 January 2025:  1.  Seronegative rheumatoid arthritis.  She was started on methotrexate last month which she is tolerating well.  We will update her labs today and if they are stable she will INCREASE METHOTREXATE TO 15 MG WEEKLY (written instructions provided to the patient).  She will stay on 2.5 mg prednisone, a week prior to her next visit she will discontinue prednisone to see if  methotrexate can keep the pain and swelling in her hands and wrists under control.  2.  She also has osteoarthritis of the knee joints (advanced patellofemoral, moderate lateral compartment, also osteoarthritis of the right hip for which she is following with orthopedics.  Synvisc injections are being planned for her knee osteoarthritis by orthopedics.     She tells me that her rheumatologist is not interested in having her get rituximab more frequently (he feels methotrexate is safer than rituximab).  She does have a visit this afternoon for another infusion of rituximab.  She and I agreed not to change her management for now.     ECOG Peformance Status  2 - Ambulatory and capable of self care but unable to carry out roverto work activities, up and about more than 50% of waking hours     Patient Active Problem List   Diagnosis     Lymphocytic colitis     Acquired hypothyroidism     CVID (common variable immunodeficiency) (H)     B12 deficiency     CARDIOVASCULAR SCREENING; LDL GOAL LESS THAN 130     Other autoimmune hemolytic anemia (H)     Right-sided low back pain with right-sided sciatica, unspecified chronicity     Autoimmune hemolytic anemia (H)     Bronchiectasis (H)     Mild obstructive sleep apnea     Selective deficiency of IgG subclasses (H)     Anemia, iron deficiency     Mild protein-calorie malnutrition     Ataxia, unspecified     DDD (degenerative disc disease), lumbar     Diarrhea     Other acquired deformities of left foot     Other acquired deformities of right foot     Other amnesia     Other disturbances of skin sensation     Hand arthritis     Bilateral hand pain     Current Outpatient Medications   Medication Sig Dispense Refill     cyanocobalamin (VITAMIN  B-12) 1000 MCG tablet   3     folic acid (FOLVITE) 1 MG tablet   3     hydroCHLOROthiazide 12.5 MG tablet TAKE 1 TABLET AS NEEDED FOR EDEMA 30 tablet 2     ketoconazole (NIZORAL) 2 % external shampoo Use every other day to scalp as needed 120  mL 0     levothyroxine (SYNTHROID/LEVOTHROID) 150 MCG tablet Take 1 tablet (150 mcg) by mouth daily 90 tablet 3     methotrexate 2.5 MG tablet Take 6 tablets (15 mg) by mouth every 7 days. 42 tablet 0     omega 3 1000 MG CAPS Take 1,280 mg by mouth daily.       predniSONE (DELTASONE) 5 MG tablet Take 1 tablet (5 mg) by mouth 2 times daily. (Patient not taking: Reported on 2/7/2025) 60 tablet 1     triamcinolone (KENALOG) 0.1 % external ointment Apply topically 2 times daily       No current facility-administered medications for this visit.     Facility-Administered Medications Ordered in Other Visits   Medication Dose Route Frequency Provider Last Rate Last Admin     sodium chloride 0.9% BOLUS 250 mL  250 mL Intravenous Once Isaiah Man PA         Past Medical History:   Diagnosis Date     Arthritis      WARD (dyspnea on exertion)      External hemorrhoids without mention of complication 06/27/2005     Hemolytic anemia     autoimmune     Hypothyroidism      IgA deficiency (H)      Myopia of both eyes with astigmatism and presbyopia 08/16/2017     Other malaise and fatigue      Other severe protein-calorie malnutrition      Other vitreous opacities 12/19/2006     Sleep apnea      Thyroid disease      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    Hemorrhage Subdural Trauma Without LOC Active     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, and posterior vitreous detachment     Urinary tract infection, site not specified     Recurrent UTI's     Past Surgical History:   Procedure Laterality Date     BIOPSY MUSCLE DIAGNOSTIC (LOCATION) Right 1/16/2024    Procedure: BIOPSY, MUSCLE - RIGHT THIGH;  Surgeon: Dickson Madrid MD;  Location: [facility] OR     COLONOSCOPY N/A 4/26/2016    Procedure: COLONOSCOPY;  Surgeon: Dontae Del Rio MD;  Location:  GI     COLONOSCOPY N/A 2/22/2024    Procedure: Colonoscopy with polypectomy by using cold snare and two hemoclips applied;   Surgeon: Dontae Del Rio MD;  Location:  GI     ENT SURGERY  1985    Thyroid lobectomy     ESOPHAGOSCOPY, GASTROSCOPY, DUODENOSCOPY (EGD), COMBINED N/A 2/22/2024    Procedure: ESOPHAGOGASTRODUODENOSCOPY, WITH BIOPSY by using cold forcep;  Surgeon: Dontae Del Rio MD;  Location:  GI     EYE SURGERY Bilateral 04/20/2021    Cataract Surgery     HC CYSTOSCOPY,INSERT URETERAL STENT      Ureteral stent insertion     HC FLEX SIGMOIDOSCOPY W BIOPSY  5/30/00     HC LAPAROSCOPY, SURGICAL; CHOLECYSTECTOMY  1992      THYROIDECTOMY       LIGATION OF HEMORRHOID(S)      Hemorrhoidectomy     UPPER GI ENDOSCOPY,BIOPSY  10/01/01     ZZC LIGATE FALLOPIAN TUBE       ZZHC COLONOSCOPY W BIOPSY  4/26/00     ZZHC COLONOSCOPY W BIOPSY  10/8/03     ZZHC COLONOSCOPY W BIOPSY  6/27/05    REPEAT IN 5 YEARS     Social History     Socioeconomic History     Marital status:      Spouse name: Not on file     Number of children: Not on file     Years of education: Not on file     Highest education level: Not on file   Occupational History     Not on file   Tobacco Use     Smoking status: Never     Passive exposure: Never     Smokeless tobacco: Never   Vaping Use     Vaping status: Never Used   Substance and Sexual Activity     Alcohol use: Not Currently     Comment: 1 a day at most     Drug use: No     Sexual activity: Not Currently     Partners: Male   Other Topics Concern     Parent/sibling w/ CABG, MI or angioplasty before 65F 55M? No   Social History Narrative     Not on file     Social Drivers of Health     Financial Resource Strain: Low Risk  (1/5/2024)    Financial Resource Strain      Within the past 12 months, have you or your family members you live with been unable to get utilities (heat, electricity) when it was really needed?: No   Food Insecurity: Low Risk  (1/5/2024)    Food Insecurity      Within the past 12 months, did you worry that your food would run out before you got money to buy more?: No      Within the past  "12 months, did the food you bought just not last and you didn t have money to get more?: No   Transportation Needs: Low Risk  (1/5/2024)    Transportation Needs      Within the past 12 months, has lack of transportation kept you from medical appointments, getting your medicines, non-medical meetings or appointments, work, or from getting things that you need?: No   Physical Activity: Not on file   Stress: Not on file   Social Connections: Not on file   Interpersonal Safety: Low Risk  (10/24/2024)    Interpersonal Safety      Do you feel physically and emotionally safe where you currently live?: Yes      Within the past 12 months, have you been hit, slapped, kicked or otherwise physically hurt by someone?: No      Within the past 12 months, have you been humiliated or emotionally abused in other ways by your partner or ex-partner?: No   Housing Stability: Low Risk  (1/5/2024)    Housing Stability      Do you have housing? : Yes      Are you worried about losing your housing?: No     Review of Systems   Constitutional:  Positive for fatigue.   HENT:  Negative.     Eyes: Negative.    Respiratory: Negative.     Cardiovascular: Negative.    Gastrointestinal: Negative.         Several poops every morning, not diarrhea, not changing   Endocrine: Negative.    Genitourinary: Negative.     Musculoskeletal:  Positive for arthralgias, back pain, gait problem and neck pain.   Neurological:  Positive for gait problem.   Hematological: Negative.    Psychiatric/Behavioral:  Positive for sleep disturbance (naps every day).    All other systems reviewed and are negative.      Ht 1.803 m (5' 11\")   Wt 63.5 kg (140 lb)   LMP  (LMP Unknown)   BMI 19.53 kg/m      GENERAL: alert and no distress  RESP: No audible wheeze, cough, or visible cyanosis.    NEURO: Cranial nerves grossly intact.  Mentation and speech appropriate for age.  PSYCH: Appropriate affect, tone, and pace of words    Recent Results (from the past 720 hours)   IgG    " Collection Time: 02/20/25  1:30 PM   Result Value Ref Range    Immunoglobulin G 419 (L) 610 - 1,616 mg/dL   CBC with platelets and differential    Collection Time: 02/20/25  1:30 PM   Result Value Ref Range    WBC Count 7.2 4.0 - 11.0 10e3/uL    RBC Count 3.23 (L) 3.80 - 5.20 10e6/uL    Hemoglobin 11.3 (L) 11.7 - 15.7 g/dL    Hematocrit 33.8 (L) 35.0 - 47.0 %     (H) 78 - 100 fL    MCH 35.0 (H) 26.5 - 33.0 pg    MCHC 33.4 31.5 - 36.5 g/dL    RDW 14.8 10.0 - 15.0 %    Platelet Count 220 150 - 450 10e3/uL    % Neutrophils 55 %    % Lymphocytes 32 %    % Monocytes 12 %    % Eosinophils 0 %    % Basophils 0 %    % Immature Granulocytes 0 %    NRBCs per 100 WBC 0 <1 /100    Absolute Neutrophils 4.0 1.6 - 8.3 10e3/uL    Absolute Lymphocytes 2.3 0.8 - 5.3 10e3/uL    Absolute Monocytes 0.9 0.0 - 1.3 10e3/uL    Absolute Eosinophils 0.0 0.0 - 0.7 10e3/uL    Absolute Basophils 0.0 0.0 - 0.2 10e3/uL    Absolute Immature Granulocytes 0.0 <=0.4 10e3/uL    Absolute NRBCs 0.0 10e3/uL     Recent Results (from the past 744 hours)   CT Head w/o Contrast    Narrative    EXAM: CT HEAD W/O CONTRAST  LOCATION: [facility]  DATE: 1/31/2025    INDICATION: Headaches, fall last June, prior SDH.  COMPARISON: None available  TECHNIQUE: Routine CT Head without IV contrast. Multiplanar reformats. Dose reduction techniques were used.    FINDINGS:  INTRACRANIAL CONTENTS: No intracranial hemorrhage, extraaxial collection, or mass effect.  No CT evidence of acute infarct. Normal parenchymal attenuation. Normal ventricles and sulci.     VISUALIZED ORBITS/SINUSES/MASTOIDS: No intraorbital abnormality. No paranasal sinus mucosal disease. No middle ear or mastoid effusion.    BONES/SOFT TISSUES: No acute abnormality.      Impression    IMPRESSION: Normal head CT.   CT Cervical Spine w/o Contrast    Narrative    EXAM: CT CERVICAL SPINE W/O CONTRAST  LOCATION: [facility]  DATE: 1/31/2025    INDICATION:  Trauma, remote last June, neck pain.  COMPARISON: None.  TECHNIQUE: Routine CT Cervical Spine without IV contrast. Multiplanar reformats. Dose reduction techniques were used.      Impression    IMPRESSION: Mild degenerative anterolisthesis of C4 upon C5. Alignment of the cervical vertebrae is otherwise normal. Vertebral body heights of the cervical spine are normal. Craniocervical alignment is normal. No fractures. There is loss of disc space   height and degenerative endplate spurring at C5-C6 and C6-C7. Facet arthropathy throughout the cervical spine. No high-grade spinal canal stenosis. No prevertebral soft tissue swelling.       Again, thank you for allowing me to participate in the care of your patient.        Sincerely,        Fely Ramos MD    Electronically signed

## 2025-02-24 NOTE — LETTER
2/24/2025      Tricia Sosa  [address]  [facility] [id number]      Dear Colleague,    Thank you for referring your patient, Tricia Sosa, to the [facility] [place]. Please see a copy of my visit note below.    Virtual Visit Details    Type of service:  Telephone Visit    Phone call duration: 30  minutes   Originating Location (pt. Location): Home    Distant Location (provider location):  On-site  Telephone visit completed due to the patient did not have access to video, while the distant provider did.                              Assessment & Plan  CVID  AIHA  Seronegative rheumatoid arthritis  ED visit last month for neck pain     IV IgG and Rituximab today  Next provider visit in 2 months     Interval History  This is a scheduled follow up visit for this lady who previously has been seen by the [facility] (2004).  She had onset of AIHA in 2015 for which she was seen by Domi Matos, Melina, and Aleksey for CVID, IgA deficiency and AIHA.  She's currently getting monthly rituximab (which controls her AIHA) and every two months IV IgG.  Her last hemolytic episode was 2023.    She was ill a few months ago and her weight dropped from ~155# to ~130#.  She's now regained some of that.  She does have ongoing problems with fatigue and weakness (has trouble getting out of a chair, needs to use a walker.     She recently had repeat injections of knees (gel) and right hip (steroids) because of worse arthritis in her knees, and hips.      She is followed by Amy Sosa MD (rheumatology) with last note from 14 January 2025:  1.  Seronegative rheumatoid arthritis.  She was started on methotrexate last month which she is tolerating well.  We will update her labs today and if they are stable she will INCREASE METHOTREXATE TO 15 MG WEEKLY (written instructions provided to the patient).  She will stay on 2.5 mg prednisone, a week prior to her next visit she will discontinue prednisone to see if  methotrexate can keep the pain and swelling in her hands and wrists under control.  2.  She also has osteoarthritis of the knee joints (advanced patellofemoral, moderate lateral compartment, also osteoarthritis of the right hip for which she is following with orthopedics.  Synvisc injections are being planned for her knee osteoarthritis by orthopedics.     She tells me that her rheumatologist is not interested in having her get rituximab more frequently (he feels methotrexate is safer than rituximab).  She does have a visit this afternoon for another infusion of rituximab.  She and I agreed not to change her management for now.     ECOG Peformance Status  2 - Ambulatory and capable of self care but unable to carry out roverto work activities, up and about more than 50% of waking hours     Patient Active Problem List   Diagnosis     Lymphocytic colitis     Acquired hypothyroidism     CVID (common variable immunodeficiency) (H)     B12 deficiency     CARDIOVASCULAR SCREENING; LDL GOAL LESS THAN 130     Other autoimmune hemolytic anemia (H)     Right-sided low back pain with right-sided sciatica, unspecified chronicity     Autoimmune hemolytic anemia (H)     Bronchiectasis (H)     Mild obstructive sleep apnea     Selective deficiency of IgG subclasses (H)     Anemia, iron deficiency     Mild protein-calorie malnutrition     Ataxia, unspecified     DDD (degenerative disc disease), lumbar     Diarrhea     Other acquired deformities of left foot     Other acquired deformities of right foot     Other amnesia     Other disturbances of skin sensation     Hand arthritis     Bilateral hand pain     Current Outpatient Medications   Medication Sig Dispense Refill     cyanocobalamin (VITAMIN  B-12) 1000 MCG tablet   3     folic acid (FOLVITE) 1 MG tablet   3     hydroCHLOROthiazide 12.5 MG tablet TAKE 1 TABLET AS NEEDED FOR EDEMA 30 tablet 2     ketoconazole (NIZORAL) 2 % external shampoo Use every other day to scalp as needed 120  mL 0     levothyroxine (SYNTHROID/LEVOTHROID) 150 MCG tablet Take 1 tablet (150 mcg) by mouth daily 90 tablet 3     methotrexate 2.5 MG tablet Take 6 tablets (15 mg) by mouth every 7 days. 42 tablet 0     omega 3 1000 MG CAPS Take 1,280 mg by mouth daily.       predniSONE (DELTASONE) 5 MG tablet Take 1 tablet (5 mg) by mouth 2 times daily. (Patient not taking: Reported on 2/7/2025) 60 tablet 1     triamcinolone (KENALOG) 0.1 % external ointment Apply topically 2 times daily       No current facility-administered medications for this visit.     Facility-Administered Medications Ordered in Other Visits   Medication Dose Route Frequency Provider Last Rate Last Admin     sodium chloride 0.9% BOLUS 250 mL  250 mL Intravenous Once Isaiah Man PA         Past Medical History:   Diagnosis Date     Arthritis      WARD (dyspnea on exertion)      External hemorrhoids without mention of complication 06/27/2005     Hemolytic anemia     autoimmune     Hypothyroidism      IgA deficiency (H)      Myopia of both eyes with astigmatism and presbyopia 08/16/2017     Other malaise and fatigue      Other severe protein-calorie malnutrition      Other vitreous opacities 12/19/2006     Sleep apnea      Thyroid disease      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    Hemorrhage Subdural Trauma Without LOC Active     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, and posterior vitreous detachment     Urinary tract infection, site not specified     Recurrent UTI's     Past Surgical History:   Procedure Laterality Date     BIOPSY MUSCLE DIAGNOSTIC (LOCATION) Right 1/16/2024    Procedure: BIOPSY, MUSCLE - RIGHT THIGH;  Surgeon: Dickson Madrid MD;  Location: [facility] OR     COLONOSCOPY N/A 4/26/2016    Procedure: COLONOSCOPY;  Surgeon: Dontae Del Rio MD;  Location:  GI     COLONOSCOPY N/A 2/22/2024    Procedure: Colonoscopy with polypectomy by using cold snare and two hemoclips applied;   Surgeon: Dontae Del Rio MD;  Location:  GI     ENT SURGERY  1985    Thyroid lobectomy     ESOPHAGOSCOPY, GASTROSCOPY, DUODENOSCOPY (EGD), COMBINED N/A 2/22/2024    Procedure: ESOPHAGOGASTRODUODENOSCOPY, WITH BIOPSY by using cold forcep;  Surgeon: Dontae Del Rio MD;  Location:  GI     EYE SURGERY Bilateral 04/20/2021    Cataract Surgery     HC CYSTOSCOPY,INSERT URETERAL STENT      Ureteral stent insertion     HC FLEX SIGMOIDOSCOPY W BIOPSY  5/30/00     HC LAPAROSCOPY, SURGICAL; CHOLECYSTECTOMY  1992      THYROIDECTOMY       LIGATION OF HEMORRHOID(S)      Hemorrhoidectomy     UPPER GI ENDOSCOPY,BIOPSY  10/01/01     ZZC LIGATE FALLOPIAN TUBE       ZZHC COLONOSCOPY W BIOPSY  4/26/00     ZZHC COLONOSCOPY W BIOPSY  10/8/03     ZZHC COLONOSCOPY W BIOPSY  6/27/05    REPEAT IN 5 YEARS     Social History     Socioeconomic History     Marital status:      Spouse name: Not on file     Number of children: Not on file     Years of education: Not on file     Highest education level: Not on file   Occupational History     Not on file   Tobacco Use     Smoking status: Never     Passive exposure: Never     Smokeless tobacco: Never   Vaping Use     Vaping status: Never Used   Substance and Sexual Activity     Alcohol use: Not Currently     Comment: 1 a day at most     Drug use: No     Sexual activity: Not Currently     Partners: Male   Other Topics Concern     Parent/sibling w/ CABG, MI or angioplasty before 65F 55M? No   Social History Narrative     Not on file     Social Drivers of Health     Financial Resource Strain: Low Risk  (1/5/2024)    Financial Resource Strain      Within the past 12 months, have you or your family members you live with been unable to get utilities (heat, electricity) when it was really needed?: No   Food Insecurity: Low Risk  (1/5/2024)    Food Insecurity      Within the past 12 months, did you worry that your food would run out before you got money to buy more?: No      Within the past  "12 months, did the food you bought just not last and you didn t have money to get more?: No   Transportation Needs: Low Risk  (1/5/2024)    Transportation Needs      Within the past 12 months, has lack of transportation kept you from medical appointments, getting your medicines, non-medical meetings or appointments, work, or from getting things that you need?: No   Physical Activity: Not on file   Stress: Not on file   Social Connections: Not on file   Interpersonal Safety: Low Risk  (10/24/2024)    Interpersonal Safety      Do you feel physically and emotionally safe where you currently live?: Yes      Within the past 12 months, have you been hit, slapped, kicked or otherwise physically hurt by someone?: No      Within the past 12 months, have you been humiliated or emotionally abused in other ways by your partner or ex-partner?: No   Housing Stability: Low Risk  (1/5/2024)    Housing Stability      Do you have housing? : Yes      Are you worried about losing your housing?: No     Review of Systems   Constitutional:  Positive for fatigue.   HENT:  Negative.     Eyes: Negative.    Respiratory: Negative.     Cardiovascular: Negative.    Gastrointestinal: Negative.         Several poops every morning, not diarrhea, not changing   Endocrine: Negative.    Genitourinary: Negative.     Musculoskeletal:  Positive for arthralgias, back pain, gait problem and neck pain.   Neurological:  Positive for gait problem.   Hematological: Negative.    Psychiatric/Behavioral:  Positive for sleep disturbance (naps every day).    All other systems reviewed and are negative.      Ht 1.803 m (5' 11\")   Wt 63.5 kg (140 lb)   LMP  (LMP Unknown)   BMI 19.53 kg/m      GENERAL: alert and no distress  RESP: No audible wheeze, cough, or visible cyanosis.    NEURO: Cranial nerves grossly intact.  Mentation and speech appropriate for age.  PSYCH: Appropriate affect, tone, and pace of words    Recent Results (from the past 720 hours)   IgG    " Collection Time: 02/20/25  1:30 PM   Result Value Ref Range    Immunoglobulin G 419 (L) 610 - 1,616 mg/dL   CBC with platelets and differential    Collection Time: 02/20/25  1:30 PM   Result Value Ref Range    WBC Count 7.2 4.0 - 11.0 10e3/uL    RBC Count 3.23 (L) 3.80 - 5.20 10e6/uL    Hemoglobin 11.3 (L) 11.7 - 15.7 g/dL    Hematocrit 33.8 (L) 35.0 - 47.0 %     (H) 78 - 100 fL    MCH 35.0 (H) 26.5 - 33.0 pg    MCHC 33.4 31.5 - 36.5 g/dL    RDW 14.8 10.0 - 15.0 %    Platelet Count 220 150 - 450 10e3/uL    % Neutrophils 55 %    % Lymphocytes 32 %    % Monocytes 12 %    % Eosinophils 0 %    % Basophils 0 %    % Immature Granulocytes 0 %    NRBCs per 100 WBC 0 <1 /100    Absolute Neutrophils 4.0 1.6 - 8.3 10e3/uL    Absolute Lymphocytes 2.3 0.8 - 5.3 10e3/uL    Absolute Monocytes 0.9 0.0 - 1.3 10e3/uL    Absolute Eosinophils 0.0 0.0 - 0.7 10e3/uL    Absolute Basophils 0.0 0.0 - 0.2 10e3/uL    Absolute Immature Granulocytes 0.0 <=0.4 10e3/uL    Absolute NRBCs 0.0 10e3/uL     Recent Results (from the past 744 hours)   CT Head w/o Contrast    Narrative    EXAM: CT HEAD W/O CONTRAST  LOCATION: [facility]  DATE: 1/31/2025    INDICATION: Headaches, fall last June, prior SDH.  COMPARISON: None available  TECHNIQUE: Routine CT Head without IV contrast. Multiplanar reformats. Dose reduction techniques were used.    FINDINGS:  INTRACRANIAL CONTENTS: No intracranial hemorrhage, extraaxial collection, or mass effect.  No CT evidence of acute infarct. Normal parenchymal attenuation. Normal ventricles and sulci.     VISUALIZED ORBITS/SINUSES/MASTOIDS: No intraorbital abnormality. No paranasal sinus mucosal disease. No middle ear or mastoid effusion.    BONES/SOFT TISSUES: No acute abnormality.      Impression    IMPRESSION: Normal head CT.   CT Cervical Spine w/o Contrast    Narrative    EXAM: CT CERVICAL SPINE W/O CONTRAST  LOCATION: [facility]  DATE: 1/31/2025    INDICATION:  Trauma, remote last June, neck pain.  COMPARISON: None.  TECHNIQUE: Routine CT Cervical Spine without IV contrast. Multiplanar reformats. Dose reduction techniques were used.      Impression    IMPRESSION: Mild degenerative anterolisthesis of C4 upon C5. Alignment of the cervical vertebrae is otherwise normal. Vertebral body heights of the cervical spine are normal. Craniocervical alignment is normal. No fractures. There is loss of disc space   height and degenerative endplate spurring at C5-C6 and C6-C7. Facet arthropathy throughout the cervical spine. No high-grade spinal canal stenosis. No prevertebral soft tissue swelling.       Again, thank you for allowing me to participate in the care of your patient.        Sincerely,        Fely Ramos MD    Electronically signed

## 2025-02-25 ENCOUNTER — OFFICE VISIT (OUTPATIENT)
Dept: RHEUMATOLOGY | Facility: CLINIC | Age: 85
End: 2025-02-25
Payer: MEDICARE

## 2025-02-25 VITALS
DIASTOLIC BLOOD PRESSURE: 65 MMHG | OXYGEN SATURATION: 96 % | SYSTOLIC BLOOD PRESSURE: 148 MMHG | HEART RATE: 88 BPM | WEIGHT: 143 LBS | HEIGHT: 71 IN | BODY MASS INDEX: 20.02 KG/M2

## 2025-02-25 DIAGNOSIS — M79.642 BILATERAL HAND PAIN: Primary | ICD-10-CM

## 2025-02-25 DIAGNOSIS — M06.09 SERONEGATIVE RHEUMATOID ARTHRITIS OF MULTIPLE SITES (H): ICD-10-CM

## 2025-02-25 DIAGNOSIS — M25.532 BILATERAL WRIST PAIN: ICD-10-CM

## 2025-02-25 DIAGNOSIS — M25.531 BILATERAL WRIST PAIN: ICD-10-CM

## 2025-02-25 DIAGNOSIS — M79.641 BILATERAL HAND PAIN: Primary | ICD-10-CM

## 2025-02-25 PROCEDURE — 3078F DIAST BP <80 MM HG: CPT | Performed by: INTERNAL MEDICINE

## 2025-02-25 PROCEDURE — 3077F SYST BP >= 140 MM HG: CPT | Performed by: INTERNAL MEDICINE

## 2025-02-25 PROCEDURE — 99214 OFFICE O/P EST MOD 30 MIN: CPT | Mod: 25 | Performed by: INTERNAL MEDICINE

## 2025-02-25 PROCEDURE — 20605 DRAIN/INJ JOINT/BURSA W/O US: CPT | Mod: RT | Performed by: INTERNAL MEDICINE

## 2025-02-25 RX ORDER — TRIAMCINOLONE ACETONIDE 40 MG/ML
40 INJECTION, SUSPENSION INTRA-ARTICULAR; INTRAMUSCULAR ONCE
Status: COMPLETED | OUTPATIENT
Start: 2025-02-25 | End: 2025-02-25

## 2025-02-25 RX ORDER — METHOTREXATE 2.5 MG/1
15 TABLET ORAL
Qty: 24 TABLET | Refills: 2 | Status: SHIPPED | OUTPATIENT
Start: 2025-02-25

## 2025-02-25 RX ADMIN — TRIAMCINOLONE ACETONIDE 40 MG: 40 INJECTION, SUSPENSION INTRA-ARTICULAR; INTRAMUSCULAR at 15:10

## 2025-02-25 RX ADMIN — TRIAMCINOLONE ACETONIDE 40 MG: 40 INJECTION, SUSPENSION INTRA-ARTICULAR; INTRAMUSCULAR at 15:09

## 2025-02-25 NOTE — PROGRESS NOTES
"  Assessment and Plan    1.  Tricia complains of \"dull achiness \"in her thumbs and wrists at night.  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  There is mild puffiness and tenderness of the wrists, we will inject the wrists to see if it helps with the symptoms.  She does not have any swelling in the MCP joints.  There is swelling of the middle and ring finger PIP knuckles bilaterally and we discussed injecting them but that is not where most of her hand pain lies.    If she gets relief from these wrist injections it would be a much safer way to control her symptoms then going back on prednisone (10 mg daily).  We discussed the side effects of long-term high-dose steroids including bone loss, fracture risk, cataracts, muscle weakness, diabetes etc.    Procedure 1: Maintaining strict sterile and aseptic precautions the right wrist (radiocarpal joint) is injected with 40 mg Kenalog.  Patient tolerated seizure well.    Procedure 2: Maintaining strict sterile and aseptic precautions the left wrist joint is also injected with 40 mg Kenalog.  Patient tolerated procedure well.    2.  She is tolerating methotrexate and her methotrexate labs were stable last month.  She is not sure if methotrexate is helping.  I suggested staying on methotrexate for 2 more months, and April we will reassess the situation.  At that point we can start methotrexate, if her joint pain, swelling gets worse after stopping methotrexate then that would be indirect prove that methotrexate was helping.    Part of the problem evaluating the effectiveness of her RA treatments is her concomitant osteoarthritis which complicates the clinical picture and response to these meds.    Follow-up 2 months.            Rheumatology follow-up visit note   Subjective: Dull ache in the wrists, thumbs at night        Tricia is a very pleasant 84-year-old lady with a history of autoimmune hemolytic anemia for which she has been getting Rituxan " infusions since 2019.  She follows with hematology and is getting rituximab infusions every 2 months, IVIG was also added recently.  She has a history of osteoarthritis involving her spine, was told she had osteoarthritis in the knee joints.  She was relatively comfortable and functional until July 2020 when she started noticing acute worsening and swelling of her knee pain.  This was followed by pain in her fingers particularly the PIP and DIP knuckles, she noticed some neck stiffness and her hips were also quite uncomfortable especially the right hip.  The worsening pain in her hands, hips was a significant change for her and she was quite uncomfortable.  She was seen by orthopedics, they injected her knee joints which relieved her knee pain to some extent.  X-rays of the knees show advanced bone-on-bone osteoarthritis at the patellofemoral joints and moderate arthritis of the lateral compartments especially the right knee.  Recently she had cortisone injections in both knees which helped her joint pain elsewhere as well temporarily.  She is also seeing orthopedics for osteoarthritis in the right hip.  The right hip bothers her more mostly with certain movements guarding getting up, fortunately with ordinary walking the right hip pain is not too debilitating.    She was started on 10 mg prednisone, noticed significant improvement but on dropping the dose to 7.5 mg prednisone noticed that the dose was not as effective.  She is now off prednisone.  We debated the long-term side effects of staying on high-dose prednisone including bone loss, fracture risk, muscle weakness etc.  She does have some tenderness in the CMC joints of the thumb which could be the source of pain and for which unfortunately there is no rheumatologic treatment except surgery or injection by hand specialist.  She does have some puffiness in the wrists and we will inject them, if the source of pain is her wrists she will likely get relief from  these injections.    Hand x-rays reviewed, the MCP joints are normal without any erosions, DJD noted in the PIP and DIP knuckles with gull wing changes in the right little finger DIP knuckle.  She was started on methotrexate 2 months ago, she is tolerating it well but not sure if it is helping the pain in her fingers, hands.    She had methotrexate monitoring labs in January 2025 which are stable.        She is , retired (post ), has a college education, lives with her son and his family in [place].  She does not smoke, no alcohol.    Past medical history notable for autoimmune hemolytic anemia, hypothyroidism.  She has also been on steroids in the past for the hemolytic anemia.      DETAILED EXAMINATION  02/25/25  :    There were no vitals filed for this visit.      Pleasant elderly lady, alert and oriented x 3     Head/neck.  No rash, no jaundice, no ocular redness     Lungs clear, no crackles or wheezing     Heart sounds regular          Musculoskeletal: Note:     Hands.  + Swelling and tenderness of the right middle finger PIP knuckle noted, the remaining knuckles are nonswollen and nontender bilaterally.  She does have tenderness at the CMC joints of the thumbs which I explained is due to osteoarthritis (not RA).+ Swelling and mild tenderness of the PIP joint of the left middle finger is also noted.    Right wrist.+ Mild synovitis,+ tenderness    Left wrist+ mild puffiness,+ tenderness     Elbows full range of motion, no synovitis or redness     Shoulders normal abduction and external rotation bilaterally          Knees.  Small effusions, no redness or warmth.     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     Priority: Medium    Mild obstructive sleep apnea 11/24/2019     Priority: Medium    DDD (degenerative disc disease), lumbar 08/05/2019     Priority: Medium    Right-sided low back pain with right-sided sciatica, unspecified chronicity 11/16/2017     Priority: Medium    Other autoimmune hemolytic anemia (H) 09/21/2017     Priority: Medium     IMO Regulatory Load OCT 2020      Diarrhea 07/01/2016     Priority: Medium    Other amnesia 02/29/2016     Priority: Medium    Ataxia, unspecified 01/27/2016     Priority: Medium    Other acquired deformities of left foot 01/27/2016     Priority: Medium    Other acquired deformities of right foot 01/27/2016     Priority: Medium    Other disturbances of skin sensation 01/27/2016     Priority: Medium    B12 deficiency 08/03/2015     Priority: Medium     Started on B12 injections 8/3/15.      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 Estevez.        Bronchiectasis (H) 04/16/2009     Priority: Medium    Lymphocytic colitis 09/27/2001     Priority: Medium     Problem list name updated by automated process. Provider to review      Acquired hypothyroidism 09/27/2001     Priority: Medium     Past Surgical History:   Procedure Laterality Date    BIOPSY MUSCLE DIAGNOSTIC (LOCATION) Right 1/16/2024    Procedure: BIOPSY, MUSCLE - RIGHT THIGH;  Surgeon: Dickson Madrid MD;  Location:  OR    COLONOSCOPY N/A 4/26/2016    Procedure: COLONOSCOPY;  Surgeon: Dontae Del Rio MD;  Location:  GI    COLONOSCOPY N/A 2/22/2024    Procedure: Colonoscopy with polypectomy by using cold snare and two hemoclips applied;  Surgeon: Dontae Del Rio MD;  Location:  GI    ENT SURGERY  1985    Thyroid lobectomy    ESOPHAGOSCOPY,  GASTROSCOPY, DUODENOSCOPY (EGD), COMBINED N/A 2/22/2024    Procedure: ESOPHAGOGASTRODUODENOSCOPY, WITH BIOPSY by using cold forcep;  Surgeon: Dontae Del Rio MD;  Location: [facility]    EYE SURGERY Bilateral 04/20/2021    Cataract Surgery    HC CYSTOSCOPY,INSERT URETERAL STENT      Ureteral stent insertion    HC FLEX SIGMOIDOSCOPY W BIOPSY  5/30/00     LAPAROSCOPY, SURGICAL; CHOLECYSTECTOMY  1992     THYROIDECTOMY      LIGATION OF HEMORRHOID(S)      Hemorrhoidectomy    UPPER GI ENDOSCOPY,BIOPSY  10/01/01    ZZ LIGATE FALLOPIAN TUBE      ZZ COLONOSCOPY W BIOPSY  4/26/00    ZZ COLONOSCOPY W BIOPSY  10/8/03    ZZ COLONOSCOPY W BIOPSY  6/27/05    REPEAT IN 5 YEARS      Past Medical History:   Diagnosis Date    Arthritis     WARD (dyspnea on exertion)     External hemorrhoids without mention of complication 06/27/2005    Hemolytic anemia     autoimmune    Hypothyroidism     IgA deficiency (H)     Myopia of both eyes with astigmatism and presbyopia 08/16/2017    Other malaise and fatigue     Other severe protein-calorie malnutrition     Other vitreous opacities 12/19/2006    Sleep apnea     Thyroid disease     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    Hemorrhage Subdural Trauma Without LOC Active    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, and posterior vitreous detachment    Urinary tract infection, site not specified     Recurrent UTI's     Allergies   Allergen Reactions    Doxycycline      Current Outpatient Medications   Medication Sig Dispense Refill    cyanocobalamin (VITAMIN  B-12) 1000 MCG tablet   3    folic acid (FOLVITE) 1 MG tablet   3    hydroCHLOROthiazide 12.5 MG tablet TAKE 1 TABLET AS NEEDED FOR EDEMA 30 tablet 2    ketoconazole (NIZORAL) 2 % external shampoo Use every other day to scalp as needed 120 mL 0    levothyroxine (SYNTHROID/LEVOTHROID) 150 MCG tablet Take 1 tablet (150 mcg) by mouth daily 90 tablet 3    methotrexate 2.5 MG tablet Take 6  tablets (15 mg) by mouth every 7 days. 42 tablet 0    omega 3 1000 MG CAPS Take 1,280 mg by mouth daily.      predniSONE (DELTASONE) 5 MG tablet Take 1 tablet (5 mg) by mouth 2 times daily. (Patient not taking: Reported on 2/7/2025) 60 tablet 1    triamcinolone (KENALOG) 0.1 % external ointment Apply topically 2 times daily       family history includes Anxiety Disorder in her sister; Breast Cancer in her sister and sister; Cerebrovascular Disease in her father; Colon Cancer in her niece; Colon Cancer (age of onset: 90) in her mother; Dementia in her brother and brother; Depression in her sister; Hyperlipidemia in her sister; Lung Cancer in her sister; Other Cancer in her niece and sister; Pancreatic Cancer in her brother; Prostate Cancer in her brother; Thyroid Disease in her brother, brother, and sister.  Social Connections: Not on file          WBC   Date Value Ref Range Status   07/06/2021 7.6 4.0 - 11.0 10e9/L Final     WBC Count   Date Value Ref Range Status   02/20/2025 7.2 4.0 - 11.0 10e3/uL Final     RBC Count   Date Value Ref Range Status   02/20/2025 3.23 (L) 3.80 - 5.20 10e6/uL Final   07/06/2021 3.42 (L) 3.8 - 5.2 10e12/L Final     Hemoglobin   Date Value Ref Range Status   02/20/2025 11.3 (L) 11.7 - 15.7 g/dL Final   07/06/2021 10.1 (L) 11.7 - 15.7 g/dL Final     Hematocrit   Date Value Ref Range Status   02/20/2025 33.8 (L) 35.0 - 47.0 % Final   07/06/2021 32.2 (L) 35.0 - 47.0 % Final     MCV   Date Value Ref Range Status   02/20/2025 105 (H) 78 - 100 fL Final   07/06/2021 94 78 - 100 fl Final     MCH   Date Value Ref Range Status   02/20/2025 35.0 (H) 26.5 - 33.0 pg Final   07/06/2021 29.5 26.5 - 33.0 pg Final     Platelet Count   Date Value Ref Range Status   02/20/2025 220 150 - 450 10e3/uL Final   07/06/2021 130 (L) 150 - 450 10e9/L Final     % Lymphocytes   Date Value Ref Range Status   02/20/2025 32 % Final   07/06/2021 6.2 % Final     AST   Date Value Ref Range Status   12/26/2024 21 0 - 45 U/L  Final   06/29/2021 30 0 - 45 U/L Final     ALT   Date Value Ref Range Status   01/14/2025 19 0 - 50 U/L Final   06/29/2021 36 0 - 50 U/L Final     Albumin   Date Value Ref Range Status   01/14/2025 4.3 3.5 - 5.2 g/dL Final   09/16/2022 4.2 3.4 - 5.0 g/dL Final   06/29/2021 3.9 3.4 - 5.0 g/dL Final     Alkaline Phosphatase   Date Value Ref Range Status   12/26/2024 142 40 - 150 U/L Final   06/29/2021 97 40 - 150 U/L Final     Creatinine   Date Value Ref Range Status   01/14/2025 0.55 0.51 - 0.95 mg/dL Final   06/29/2021 0.65 0.52 - 1.04 mg/dL Final     GFR Estimate   Date Value Ref Range Status   01/14/2025 90 >60 mL/min/1.73m2 Final     Comment:     eGFR calculated using 2021 CKD-EPI equation.   06/29/2021 84 >60 mL/min/[1.73_m2] Final     Comment:     Non  GFR Calc  Starting 12/18/2018, serum creatinine based estimated GFR (eGFR) will be   calculated using the Chronic Kidney Disease Epidemiology Collaboration   (CKD-EPI) equation.       GFR Estimate If Black   Date Value Ref Range Status   06/29/2021 >90 >60 mL/min/[1.73_m2] Final     Comment:      GFR Calc  Starting 12/18/2018, serum creatinine based estimated GFR (eGFR) will be   calculated using the Chronic Kidney Disease Epidemiology Collaboration   (CKD-EPI) equation.       Sed Rate   Date Value Ref Range Status   07/29/2020 119 (H) 0 - 30 mm/h Final     Erythrocyte Sedimentation Rate   Date Value Ref Range Status   04/15/2024 18 0 - 30 mm/hr Final     CRP Inflammation   Date Value Ref Range Status   04/14/2015 <2.9 0.0 - 8.0 mg/L Final

## 2025-03-19 ENCOUNTER — OFFICE VISIT (OUTPATIENT)
Dept: ORTHOPEDICS | Facility: CLINIC | Age: 85
End: 2025-03-19
Attending: STUDENT IN AN ORGANIZED HEALTH CARE EDUCATION/TRAINING PROGRAM
Payer: MEDICARE

## 2025-03-19 VITALS
BODY MASS INDEX: 20.02 KG/M2 | HEART RATE: 94 BPM | HEIGHT: 71 IN | OXYGEN SATURATION: 94 % | SYSTOLIC BLOOD PRESSURE: 148 MMHG | WEIGHT: 143 LBS | DIASTOLIC BLOOD PRESSURE: 83 MMHG

## 2025-03-19 DIAGNOSIS — M50.30 DDD (DEGENERATIVE DISC DISEASE), CERVICAL: ICD-10-CM

## 2025-03-19 DIAGNOSIS — M47.812 FACET ARTHROPATHY, CERVICAL: Primary | ICD-10-CM

## 2025-03-19 DIAGNOSIS — M54.2 NECK PAIN: ICD-10-CM

## 2025-03-19 DIAGNOSIS — D83.9 CVID (COMMON VARIABLE IMMUNODEFICIENCY) (H): ICD-10-CM

## 2025-03-19 DIAGNOSIS — D59.10 AUTOIMMUNE HEMOLYTIC ANEMIA (H): ICD-10-CM

## 2025-03-19 PROCEDURE — 1125F AMNT PAIN NOTED PAIN PRSNT: CPT | Performed by: PHYSICAL MEDICINE & REHABILITATION

## 2025-03-19 PROCEDURE — 99215 OFFICE O/P EST HI 40 MIN: CPT | Performed by: PHYSICAL MEDICINE & REHABILITATION

## 2025-03-19 PROCEDURE — 3079F DIAST BP 80-89 MM HG: CPT | Performed by: PHYSICAL MEDICINE & REHABILITATION

## 2025-03-19 PROCEDURE — 99417 PROLNG OP E/M EACH 15 MIN: CPT | Performed by: PHYSICAL MEDICINE & REHABILITATION

## 2025-03-19 PROCEDURE — 3077F SYST BP >= 140 MM HG: CPT | Performed by: PHYSICAL MEDICINE & REHABILITATION

## 2025-03-19 ASSESSMENT — PAIN SCALES - GENERAL: PAINLEVEL_OUTOF10: MILD PAIN (3)

## 2025-03-19 NOTE — PATIENT INSTRUCTIONS
Please schedule an appointment to be seen for hip pain at your convenience.  You can schedule this at the , or you can call [phone].    Clinic Fax:  [phone].    For nursing questions, please call [phone].    For medical records, please call [phone].

## 2025-03-19 NOTE — LETTER
"3/19/2025      Tricia Sosa  [address]  [facility] [id number]      Dear Colleague,    Thank you for referring your patient, Tricia Sosa, to the [facility]. Please see a copy of my visit note below.    PHYSICAL MEDICINE & REHABILITATION / MEDICAL SPINE        Date:  Mar 19, 2025    Name:  Tricia Sosa  YOB: 1940  MRN:  [id number]      \"I am using a new tool to help me save time with documentation.  Basically it is going to listen to our visit today and uses AI to help me draft my note.  Is it okay if I use this tool today?\"        REASON FOR CONSULTATION:  Common variable immunodeficiency, autoimmune hemolytic anemia, neck pain--history of cervical pain with recent ER visit and cervical CT that demonstrated degenerative vertebral changes and degenerative disc, would like to consider injections.        REFERRING PROVIDER:  Marley Heart MD        CHART REVIEW:  Reviewed 02/25/2025 note from Amy Sosa MD (rheumatology).  Ms. Tricia Sosa had seronegative rheumatoid arthritis.    Reviewed 02/07/2025 note from Marley Heart MD (physical medicine and rehabilitation).  Ms. Tricia Sosawas  seen in the emergency department on 01/30/2025.  She had neck and head pain and neck stiffness since August 2024.  She also had pain in her hands and knees around the same time.  Ms. Tricia Sosa was to follow-up with her primary care and rheumatology.        HISTORY OF PRESENT ILLNESS:  Ms. Tricia Sosa is an 84-year-old, right-hand-dominant, female.  She has common variable immunodeficiency, autoimmune hemolytic anemia, seronegative rheumatoid arthritis, and polyneuropathy.  Ms. Guaman denies any other personal history of autoimmune diseases, rheumatologic diseases, gout, pseudogout, neurologic diseases, inflammatory bowel diseases.    Ms. Tricia Sosa states that in 1961 she fell off a horse and injured her right thumb.  Ms. Sosa denies any other " injuries of her head, neck, upper back, mid back, chest, shoulders, arms, elbows, forearms, wrist, hands, fingers.    History of Present Illness-  - Tricia Sosa, 84-year-old female.  - Diagnosed with seronegative rheumatoid arthritis, common variable immunodeficiency (CVID), autoimmune hemolytic anemia, and osteoarthritis in knees and hip.  - Undergoes regular IgG infusions and Rituxan treatments to prevent anemia.  - Reports polyneuropathy, primarily affecting feet, with numbness extending to ankles.  - Experienced onset of arthritis pain in July-August 2024, starting in knees, then hip, hands, and neck.  - Neck pain occurs with movement, described as a brief pain, not sharp, and does not radiate to arms or back.  - No headaches, lightheadedness, or dizziness reported.  - Uses a four-wheeled walker due to weakness and balance issues, struggles to stand from chairs.  - Reports popping in knees, but no pain associated with it.  - Right leg occasionally stephanie, leading to near falls; left leg not affected.  - Experiences falls several times a year, with weekly tripping and stumbling.  - No tingling or pins and needles sensation reported.  - Occasional difficulty reaching the bathroom at night.  - Methotrexate prescribed for arthritis in hands, but no relief experienced.  - Tried ibuprofen and Tylenol without relief.  - No pain medications currently taken for neck pain.  - No history of colitis, despite previous suspicion of ulcerative colitis before CVID diagnosis.  - Fell off a horse in 1961, no significant injuries reported from the incident.    Ms. Tricia Sosa has not tried acupuncture, chiropractor treatments, injections, massage, physical therapy.  She currently rates her posterior neck and head pain as 2-3/10.  She is not taking any pain medications.    Ms. Tricia Sosa has bilateral posterior neck pain that extends into the occiput.  This is worse on the right than on the left.  Ms. Sosa denies any  "headaches.  Pain is intermittent and occurs with neck movements.  This began in August 2024 and has not changed since it began.  Pain is described as \"brief pain.\"  There is no radiation of the pain.  Ms. Tricia Sosa notes that her pain is better if she does not move her head or neck.    Ms. Tricia Sosa denies any change in her pain with coughing, sneezing, driving and hitting a pothole.  The pain does not keep her awake nor wake her.    Ms. Tricia Sosa denies any lightheadedness, dizziness.  She does note some balance problems but feels that this is at her baseline.  She uses a four-wheel walker for all of her ambulation.  Ms. Tricia Sosa notes generalized weakness.  She denies dropping objects.  She notes difficulty with overhead activities with the bilateral extremities.  Ms. Sosa notes popping in her bilateral knees without pain.  She denies any catching, locking.  Ms. Tricia Sosa notes some swelling in her bilateral wrists.  She denies any bruising or redness.  Ms. Tricia Sosa has not had any recent giveway episodes of her lower extremities.  She is tripping/stumbling weekly.  She falls several times per year.  Ms. Tricia Sosa notes numbness beginning in her feet and extending to her ankles bilaterally.  She denies any other numbness, tingling, pins-and-needles.  Ms. Tricia Sosa denies any saddle anesthesia, bowel incontinence, bladder incontinence.    Ms. Tricia Sosa notes that her bilateral lower extremities become weak and tired after standing less than a minute.  She notes her bilateral lower extremities become weak and tired with walking.  Ms. Tricia Sosa notes improvement in these symptoms with using her four-wheel walker or leaning on a shopping cart.        REVIEW OF SYSTEMS:  As detailed in the history of present illness.        ALLERGIES:  Allergies   Allergen Reactions     Doxycycline          MEDICATIONS:  Current Outpatient Medications   Medication Sig Dispense " Refill     cyanocobalamin (VITAMIN  B-12) 1000 MCG tablet   3     folic acid (FOLVITE) 1 MG tablet   3     hydroCHLOROthiazide 12.5 MG tablet TAKE 1 TABLET AS NEEDED FOR EDEMA 30 tablet 2     ketoconazole (NIZORAL) 2 % external shampoo Use every other day to scalp as needed 120 mL 0     levothyroxine (SYNTHROID/LEVOTHROID) 150 MCG tablet Take 1 tablet (150 mcg) by mouth daily 90 tablet 3     methotrexate 2.5 MG tablet Take 6 tablets (15 mg) by mouth every 7 days. 42 tablet 0     predniSONE (DELTASONE) 5 MG tablet Take 1 tablet (5 mg) by mouth 2 times daily. (Patient not taking: Reported on 2/25/2025) 60 tablet 1     triamcinolone (KENALOG) 0.1 % external ointment Apply topically 2 times daily           PAST MEDICAL HISTORY:  Past Medical History:   Diagnosis Date     Arthritis      WARD (dyspnea on exertion)      External hemorrhoids without mention of complication 06/27/2005     Hemolytic anemia     autoimmune     Hypothyroidism      IgA deficiency (H)      Myopia of both eyes with astigmatism and presbyopia 08/16/2017     Other malaise and fatigue      Other severe protein-calorie malnutrition      Other vitreous opacities 12/19/2006     Sleep apnea      Thyroid disease      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    Hemorrhage Subdural Trauma Without LOC Active     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, and posterior vitreous detachment     Urinary tract infection, site not specified     Recurrent UTI's         PAST SURGICAL HISTORY:  Past Surgical History:   Procedure Laterality Date     BIOPSY MUSCLE DIAGNOSTIC (LOCATION) Right 1/16/2024    Procedure: BIOPSY, MUSCLE - RIGHT THIGH;  Surgeon: Dickson Madrid MD;  Location: [facility] OR     COLONOSCOPY N/A 4/26/2016    Procedure: COLONOSCOPY;  Surgeon: Dontae Del Rio MD;  Location:  GI     COLONOSCOPY N/A 2/22/2024    Procedure: Colonoscopy with polypectomy by using cold snare and two hemoclips applied;  Surgeon:  Dontae Del Rio MD;  Location:  GI     ENT SURGERY      Thyroid lobectomy     ESOPHAGOSCOPY, GASTROSCOPY, DUODENOSCOPY (EGD), COMBINED N/A 2024    Procedure: ESOPHAGOGASTRODUODENOSCOPY, WITH BIOPSY by using cold forcep;  Surgeon: Dontae Del Rio MD;  Location:  GI     EYE SURGERY Bilateral 2021    Cataract Surgery     HC CYSTOSCOPY,INSERT URETERAL STENT      Ureteral stent insertion     HC FLEX SIGMOIDOSCOPY W BIOPSY  00     HC LAPAROSCOPY, SURGICAL; CHOLECYSTECTOMY        THYROIDECTOMY       LIGATION OF HEMORRHOID(S)      Hemorrhoidectomy     UPPER GI ENDOSCOPY,BIOPSY  10/01/01     ZZC LIGATE FALLOPIAN TUBE       ZZHC COLONOSCOPY W BIOPSY  00     ZZHC COLONOSCOPY W BIOPSY  10/8/03     ZZHC COLONOSCOPY W BIOPSY  05    REPEAT IN 5 YEARS         FAMILY HISTORY:  Family History   Problem Relation Age of Onset     Colon Cancer Mother 90     Cerebrovascular Disease Father          at age 85     Breast Cancer Sister      Hyperlipidemia Sister      Depression Sister      Anxiety Disorder Sister      Thyroid Disease Sister      Lung Cancer Sister      Breast Cancer Sister      Other Cancer Sister         Lung     Dementia Brother      Prostate Cancer Brother      Thyroid Disease Brother      Dementia Brother      Thyroid Disease Brother      Pancreatic Cancer Brother      Colon Cancer Niece      Other Cancer Niece         leukemia         SOCIAL HISTORY:  Social History     Socioeconomic History     Marital status:      Spouse name: Not on file     Number of children: Not on file     Years of education: Not on file     Highest education level: Not on file   Occupational History     Not on file   Tobacco Use     Smoking status: Never     Passive exposure: Never     Smokeless tobacco: Never   Vaping Use     Vaping status: Never Used   Substance and Sexual Activity     Alcohol use: Not Currently     Comment: 1 a day at most     Drug use: No     Sexual activity: Not  "Currently     Partners: Male   Other Topics Concern     Parent/sibling w/ CABG, MI or angioplasty before 65F 55M? No   Social History Narrative     Not on file     Social Drivers of Health     Financial Resource Strain: Low Risk  (1/5/2024)    Financial Resource Strain      Within the past 12 months, have you or your family members you live with been unable to get utilities (heat, electricity) when it was really needed?: No   Food Insecurity: Low Risk  (1/5/2024)    Food Insecurity      Within the past 12 months, did you worry that your food would run out before you got money to buy more?: No      Within the past 12 months, did the food you bought just not last and you didn t have money to get more?: No   Transportation Needs: Low Risk  (1/5/2024)    Transportation Needs      Within the past 12 months, has lack of transportation kept you from medical appointments, getting your medicines, non-medical meetings or appointments, work, or from getting things that you need?: No   Physical Activity: Not on file   Stress: Not on file   Social Connections: Not on file   Interpersonal Safety: Low Risk  (10/24/2024)    Interpersonal Safety      Do you feel physically and emotionally safe where you currently live?: Yes      Within the past 12 months, have you been hit, slapped, kicked or otherwise physically hurt by someone?: No      Within the past 12 months, have you been humiliated or emotionally abused in other ways by your partner or ex-partner?: No   Housing Stability: Low Risk  (1/5/2024)    Housing Stability      Do you have housing? : Yes      Are you worried about losing your housing?: No         PHYSICAL EXAMINATION:  Vitals:    03/19/25 1327   BP: (!) 148/83   Pulse: 94   SpO2: 94%   Weight: 64.9 kg (143 lb)   Height: 1.803 m (5' 11\")       GENERAL:  No acute distress.  Pleasant and cooperative.   PSYCH:  Normal mood and affect.  HEAD:  Normocephalic.  SPEECH:  No dysarthria.  EYES:  No scleral icterus.  Wearing " glasses.  EARS:  Hearing is intact to spoken voice.  NOSE/MOUTH:  Wearing a face mask.  LUNGS:  No respiratory distress.  No increased work of breathing.  VASCULAR/PULSES:  Radial:  Right 2+.  Left 2+.  UPPER EXTREMITIES:  No clubbing, cyanosis, or edema bilaterally.    BALANCE AND GAIT:   The patient stands with mild forward trunk lean.    INSPECTION:  There is no erythema, ecchymosis, deformity, asymmetry, or abnormality of the neck.    CERVICAL PALPATION:  Inion:  not tender.  Greater Occipital Nerve:  Right not tender.  Left not tender.  Lesser Occipital Nerve:  Right not tender.  Left not tender.  Cervical Spinous Processes:  not tender.  Cervical Paraspinals:  Right not tender.  Left not tender.  Rectus Capitis Posterior:  Right not tender.  Left not tender.  Semispinalis:  Right not tender.  Left not tender.  Splenius Capitis:  Right not tender.  Left not tender.  Splenius Cervicis:  Right not tender.  Left not tender.  Levator Scapulae at the Neck:  Right not tender.  Left not tender.  Cervical Facet Joints:  Right not tender.  Left not tender.  Longissimus:  Right not tender.  Left not tender.  Anterior, Middle, Posterior Scalenes:  Right not tender.  Left not tender.  Sternocleidomastoid:  Right not tender.  Left not tender.  Trapezius:  Right not tender.  Left not tender.    THORACIC PALPATION:  Thoracic Spinous Processes:  not tender.  Thoracic Paraspinals:  Right not tender.  Left not tender.  Levator Scapulae at the Scapular Insertion:  Right not tender.  Left not tender.  Rhomboid Minor:  Right not tender.  Left not tender.  Rhomboid Major:  Right not tender.  Left not tender.  Infraspinatus:  Right not tender.  Left not tender.  Teres Minor:  Right not tender.  Left not tender.    CERVICAL RANGE OF MOTION:  Forward Flexion (40 ): Moderately reduced range of motion, increases right greater than left posterior neck pain, does not cause radiating pain.  Extension (40 ):  Moderately reduced range of  motion, increases right greater than left posterior neck pain, does not cause radiating pain.  Rotating Right (45 ): Severely reduced range of motion, increases right posterior neck pain, does not cause radiating pain.  Rotating Left (45 ):  Severely reduced range of motion, increases right posterior neck pain, does not cause radiating pain.  Lateral Bending Right (40 ):  Severely reduced range of motion, increases right posterior neck pain, does not cause radiating pain.  Lateral Bending Left (40 ):  Severely reduced range of motion, increases right posterior neck pain, does not cause radiating pain.    SHOULDER RANGE OF MOTION:  Active Forward Flexion (180 ):  Right 130 .  Left 130 .  Active Extension (60 ):  Right 30 .  Left 30 .  Active Abduction (180 ):  Right 130 .  Left 130 .  Scapular Dyskinesis:  Right mild to moderate.  Left mild.    STRENGTH:  Shoulder abduction:  Right 5/5.  Left 5/5.  Elbow flexion:  Right 5/5.  Left 5/5.  Wrist extension:  Right 5/5.  Left 5/5.  Elbow extension:  Right 5/5.  Left 5/5.  Wrist flexion:  Right 5/5.  Left 5/5.  Finger flexion:  Right 5/5.  Left 5/5.  Finger abduction:  Right 5/5.  Left 5/5.    SENSATION:  Intact to light touch along right C3, C4, C5, C6, C7, C8, T1, T2.  Intact to light touch along left C3, C4, C5, C6, C7, C8, T1, T2.    REFLEXES:  Biceps (C5-C6):  Right 2+.  Left 2+.  Brachioradialis (C5-C6):  Right 2+.  Left 2+.  Triceps (C7-C8):  Right 2+.  Left 2+.  Baker's:  Right -.  Left -.    CERVICAL SPECIAL TESTS:  Facet Loading:  Right +.  Left -.  Spurling Neck Compression:  Right -.  Left -.    SHOULDER SPECIAL TESTS:  Drop Arm:  Right - . Left -.          IMAGING:  EXAM: CT CERVICAL SPINE W/O CONTRAST  LOCATION: [facility]  DATE: 1/31/2025     INDICATION: Trauma, remote last June, neck pain.  COMPARISON: None.  TECHNIQUE: Routine CT Cervical Spine without IV contrast. Multiplanar reformats. Dose reduction techniques were  used.    IMPRESSION: Mild degenerative anterolisthesis of C4 upon C5. Alignment of the cervical vertebrae is otherwise normal. Vertebral body heights of the cervical spine are normal. Craniocervical alignment is normal. No fractures. There is loss of disc space height and degenerative endplate spurring at C5-C6 and C6-C7. Facet arthropathy throughout the cervical spine. No high-grade spinal canal stenosis. No prevertebral soft tissue swelling.      I reviewed the CT cervical spine from 01/31/2025.  C1 and C2 appear to be fused between the anterior arch of C1 and the dens of C2.        ASSESSMENT/PLAN:  Ms. Tricia Sosa is an 84-year-old, right-hand-dominant, female.  She does have cervical degenerative disc disease, but the bulk of her posterior neck pain seems to be related to cervical facet arthropathy.  Symptoms are worse on her right than on her left.  Discussed diagnoses, pathophysiologies, and treatment options with Ms. Tricia Sosa.  Discussed the options of doing nothing/living with it, physical therapy, chiropractic care, oral medications such as gabapentin and pregabalin, cervical facet joint medial branch blocks with following radiofrequency ablations, referral to neurosurgery.  Given Ms. Tricia Sosa's difficulty with balance, would not want to start gabapentin or pregabalin.  If we were to pursue medial branch blocks with following radiofrequency ablations, would plan to target the right C2-C3 and C3-C4 facet joints versus the right C3-C4 and C4-C5 facet joints.  Ms. Tricia Sosa does not wish to pursue any treatment options for her neck pain at this time.  She will follow-up in this clinic for neck pain as needed.    Ms. Tricia Sosa is noting some right hip pain.  She will be scheduled for an appointment to be seen for this at her convenience.        Total time on the date of the encounter:  60 minutes were spent on one more or more of the following:  discussion with patient, history, exam,  coordinating care, treatment goals, record review, documenting clinical information, and/or data review.    Consent was obtained from the patient to use an AI documentation tool in the creation of this note.      Gurjit Reza MD        Again, thank you for allowing me to participate in the care of your patient.        Sincerely,        Gurjit Reza MD    Electronically signed

## 2025-03-19 NOTE — PROGRESS NOTES
"PHYSICAL MEDICINE & REHABILITATION / MEDICAL SPINE        Date:  Mar 19, 2025    Name:  Tricia Sosa  YOB: 1940  MRN:  [id number]      \"I am using a new tool to help me save time with documentation.  Basically it is going to listen to our visit today and uses AI to help me draft my note.  Is it okay if I use this tool today?\"        REASON FOR CONSULTATION:  Common variable immunodeficiency, autoimmune hemolytic anemia, neck pain--history of cervical pain with recent ER visit and cervical CT that demonstrated degenerative vertebral changes and degenerative disc, would like to consider injections.        REFERRING PROVIDER:  Marley Heart MD        CHART REVIEW:  Reviewed 02/25/2025 note from Amy Sosa MD (rheumatology).  Ms. Tricia Sosa had seronegative rheumatoid arthritis.    Reviewed 02/07/2025 note from Marley Heart MD (physical medicine and rehabilitation).  Ms. Tricia Sosawas  seen in the emergency department on 01/30/2025.  She had neck and head pain and neck stiffness since August 2024.  She also had pain in her hands and knees around the same time.  Ms. Tricia Sosa was to follow-up with her primary care and rheumatology.        HISTORY OF PRESENT ILLNESS:  Ms. Tricia Sosa is an 84-year-old, right-hand-dominant, female.  She has common variable immunodeficiency, autoimmune hemolytic anemia, seronegative rheumatoid arthritis, and polyneuropathy.  Ms. Guaman denies any other personal history of autoimmune diseases, rheumatologic diseases, gout, pseudogout, neurologic diseases, inflammatory bowel diseases.    Ms. Tricia Sosa states that in 1961 she fell off a horse and injured her right thumb.  Ms. Sosa denies any other injuries of her head, neck, upper back, mid back, chest, shoulders, arms, elbows, forearms, wrist, hands, fingers.    History of Present Illness-  - Tricia Sosa, 84-year-old female.  - Diagnosed with seronegative rheumatoid arthritis, common " "variable immunodeficiency (CVID), autoimmune hemolytic anemia, and osteoarthritis in knees and hip.  - Undergoes regular IgG infusions and Rituxan treatments to prevent anemia.  - Reports polyneuropathy, primarily affecting feet, with numbness extending to ankles.  - Experienced onset of arthritis pain in July-August 2024, starting in knees, then hip, hands, and neck.  - Neck pain occurs with movement, described as a brief pain, not sharp, and does not radiate to arms or back.  - No headaches, lightheadedness, or dizziness reported.  - Uses a four-wheeled walker due to weakness and balance issues, struggles to stand from chairs.  - Reports popping in knees, but no pain associated with it.  - Right leg occasionally stephanie, leading to near falls; left leg not affected.  - Experiences falls several times a year, with weekly tripping and stumbling.  - No tingling or pins and needles sensation reported.  - Occasional difficulty reaching the bathroom at night.  - Methotrexate prescribed for arthritis in hands, but no relief experienced.  - Tried ibuprofen and Tylenol without relief.  - No pain medications currently taken for neck pain.  - No history of colitis, despite previous suspicion of ulcerative colitis before CVID diagnosis.  - Fell off a horse in 1961, no significant injuries reported from the incident.    Ms. Tricia Sosa has not tried acupuncture, chiropractor treatments, injections, massage, physical therapy.  She currently rates her posterior neck and head pain as 2-3/10.  She is not taking any pain medications.    Ms. Tricia Sosa has bilateral posterior neck pain that extends into the occiput.  This is worse on the right than on the left.  Ms. Sosa denies any headaches.  Pain is intermittent and occurs with neck movements.  This began in August 2024 and has not changed since it began.  Pain is described as \"brief pain.\"  There is no radiation of the pain.  Ms. Tricia Sosa notes that her pain is " better if she does not move her head or neck.    Ms. Tricia Sosa denies any change in her pain with coughing, sneezing, driving and hitting a pothole.  The pain does not keep her awake nor wake her.    Ms. Tricia Sosa denies any lightheadedness, dizziness.  She does note some balance problems but feels that this is at her baseline.  She uses a four-wheel walker for all of her ambulation.  Ms. Tricia Sosa notes generalized weakness.  She denies dropping objects.  She notes difficulty with overhead activities with the bilateral extremities.  Ms. Sosa notes popping in her bilateral knees without pain.  She denies any catching, locking.  Ms. Tricia Sosa notes some swelling in her bilateral wrists.  She denies any bruising or redness.  Ms. Tricia Sosa has not had any recent giveway episodes of her lower extremities.  She is tripping/stumbling weekly.  She falls several times per year.  Ms. Tricia Sosa notes numbness beginning in her feet and extending to her ankles bilaterally.  She denies any other numbness, tingling, pins-and-needles.  Ms. Tricia Sosa denies any saddle anesthesia, bowel incontinence, bladder incontinence.    Ms. Tricia Sosa notes that her bilateral lower extremities become weak and tired after standing less than a minute.  She notes her bilateral lower extremities become weak and tired with walking.  Ms. Tricia Sosa notes improvement in these symptoms with using her four-wheel walker or leaning on a shopping cart.        REVIEW OF SYSTEMS:  As detailed in the history of present illness.        ALLERGIES:  Allergies   Allergen Reactions    Doxycycline          MEDICATIONS:  Current Outpatient Medications   Medication Sig Dispense Refill    cyanocobalamin (VITAMIN  B-12) 1000 MCG tablet   3    folic acid (FOLVITE) 1 MG tablet   3    hydroCHLOROthiazide 12.5 MG tablet TAKE 1 TABLET AS NEEDED FOR EDEMA 30 tablet 2    ketoconazole (NIZORAL) 2 % external shampoo Use every other  day to scalp as needed 120 mL 0    levothyroxine (SYNTHROID/LEVOTHROID) 150 MCG tablet Take 1 tablet (150 mcg) by mouth daily 90 tablet 3    methotrexate 2.5 MG tablet Take 6 tablets (15 mg) by mouth every 7 days. 42 tablet 0    predniSONE (DELTASONE) 5 MG tablet Take 1 tablet (5 mg) by mouth 2 times daily. (Patient not taking: Reported on 2/25/2025) 60 tablet 1    triamcinolone (KENALOG) 0.1 % external ointment Apply topically 2 times daily           PAST MEDICAL HISTORY:  Past Medical History:   Diagnosis Date    Arthritis     WARD (dyspnea on exertion)     External hemorrhoids without mention of complication 06/27/2005    Hemolytic anemia     autoimmune    Hypothyroidism     IgA deficiency (H)     Myopia of both eyes with astigmatism and presbyopia 08/16/2017    Other malaise and fatigue     Other severe protein-calorie malnutrition     Other vitreous opacities 12/19/2006    Sleep apnea     Thyroid disease     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    Hemorrhage Subdural Trauma Without LOC Active    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, and posterior vitreous detachment    Urinary tract infection, site not specified     Recurrent UTI's         PAST SURGICAL HISTORY:  Past Surgical History:   Procedure Laterality Date    BIOPSY MUSCLE DIAGNOSTIC (LOCATION) Right 1/16/2024    Procedure: BIOPSY, MUSCLE - RIGHT THIGH;  Surgeon: Dickson Madrid MD;  Location:  OR    COLONOSCOPY N/A 4/26/2016    Procedure: COLONOSCOPY;  Surgeon: Dontae Del Rio MD;  Location:  GI    COLONOSCOPY N/A 2/22/2024    Procedure: Colonoscopy with polypectomy by using cold snare and two hemoclips applied;  Surgeon: Dontae Del Rio MD;  Location:  GI    ENT SURGERY  1985    Thyroid lobectomy    ESOPHAGOSCOPY, GASTROSCOPY, DUODENOSCOPY (EGD), COMBINED N/A 2/22/2024    Procedure: ESOPHAGOGASTRODUODENOSCOPY, WITH BIOPSY by using cold forcep;  Surgeon: Dontae Del Rio MD;   Location:  GI    EYE SURGERY Bilateral 2021    Cataract Surgery     CYSTOSCOPY,INSERT URETERAL STENT      Ureteral stent insertion     FLEX SIGMOIDOSCOPY W BIOPSY  00     LAPAROSCOPY, SURGICAL; CHOLECYSTECTOMY       THYROIDECTOMY      LIGATION OF HEMORRHOID(S)      Hemorrhoidectomy    UPPER GI ENDOSCOPY,BIOPSY  10/01/01    Z LIGATE FALLOPIAN TUBE      ZZ COLONOSCOPY W BIOPSY  00    ZZ COLONOSCOPY W BIOPSY  10/8/03    ZZHC COLONOSCOPY W BIOPSY  05    REPEAT IN 5 YEARS         FAMILY HISTORY:  Family History   Problem Relation Age of Onset    Colon Cancer Mother 90    Cerebrovascular Disease Father          at age 85    Breast Cancer Sister     Hyperlipidemia Sister     Depression Sister     Anxiety Disorder Sister     Thyroid Disease Sister     Lung Cancer Sister     Breast Cancer Sister     Other Cancer Sister         Lung    Dementia Brother     Prostate Cancer Brother     Thyroid Disease Brother     Dementia Brother     Thyroid Disease Brother     Pancreatic Cancer Brother     Colon Cancer Niece     Other Cancer Niece         leukemia         SOCIAL HISTORY:  Social History     Socioeconomic History    Marital status:      Spouse name: Not on file    Number of children: Not on file    Years of education: Not on file    Highest education level: Not on file   Occupational History    Not on file   Tobacco Use    Smoking status: Never     Passive exposure: Never    Smokeless tobacco: Never   Vaping Use    Vaping status: Never Used   Substance and Sexual Activity    Alcohol use: Not Currently     Comment: 1 a day at most    Drug use: No    Sexual activity: Not Currently     Partners: Male   Other Topics Concern    Parent/sibling w/ CABG, MI or angioplasty before 65F 55M? No   Social History Narrative    Not on file     Social Drivers of Health     Financial Resource Strain: Low Risk  (2024)    Financial Resource Strain     Within the past 12 months, have you or  "your family members you live with been unable to get utilities (heat, electricity) when it was really needed?: No   Food Insecurity: Low Risk  (1/5/2024)    Food Insecurity     Within the past 12 months, did you worry that your food would run out before you got money to buy more?: No     Within the past 12 months, did the food you bought just not last and you didn t have money to get more?: No   Transportation Needs: Low Risk  (1/5/2024)    Transportation Needs     Within the past 12 months, has lack of transportation kept you from medical appointments, getting your medicines, non-medical meetings or appointments, work, or from getting things that you need?: No   Physical Activity: Not on file   Stress: Not on file   Social Connections: Not on file   Interpersonal Safety: Low Risk  (10/24/2024)    Interpersonal Safety     Do you feel physically and emotionally safe where you currently live?: Yes     Within the past 12 months, have you been hit, slapped, kicked or otherwise physically hurt by someone?: No     Within the past 12 months, have you been humiliated or emotionally abused in other ways by your partner or ex-partner?: No   Housing Stability: Low Risk  (1/5/2024)    Housing Stability     Do you have housing? : Yes     Are you worried about losing your housing?: No         PHYSICAL EXAMINATION:  Vitals:    03/19/25 1327   BP: (!) 148/83   Pulse: 94   SpO2: 94%   Weight: 64.9 kg (143 lb)   Height: 1.803 m (5' 11\")       GENERAL:  No acute distress.  Pleasant and cooperative.   PSYCH:  Normal mood and affect.  HEAD:  Normocephalic.  SPEECH:  No dysarthria.  EYES:  No scleral icterus.  Wearing glasses.  EARS:  Hearing is intact to spoken voice.  NOSE/MOUTH:  Wearing a face mask.  LUNGS:  No respiratory distress.  No increased work of breathing.  VASCULAR/PULSES:  Radial:  Right 2+.  Left 2+.  UPPER EXTREMITIES:  No clubbing, cyanosis, or edema bilaterally.    BALANCE AND GAIT:   The patient stands with mild " forward trunk lean.    INSPECTION:  There is no erythema, ecchymosis, deformity, asymmetry, or abnormality of the neck.    CERVICAL PALPATION:  Inion:  not tender.  Greater Occipital Nerve:  Right not tender.  Left not tender.  Lesser Occipital Nerve:  Right not tender.  Left not tender.  Cervical Spinous Processes:  not tender.  Cervical Paraspinals:  Right not tender.  Left not tender.  Rectus Capitis Posterior:  Right not tender.  Left not tender.  Semispinalis:  Right not tender.  Left not tender.  Splenius Capitis:  Right not tender.  Left not tender.  Splenius Cervicis:  Right not tender.  Left not tender.  Levator Scapulae at the Neck:  Right not tender.  Left not tender.  Cervical Facet Joints:  Right not tender.  Left not tender.  Longissimus:  Right not tender.  Left not tender.  Anterior, Middle, Posterior Scalenes:  Right not tender.  Left not tender.  Sternocleidomastoid:  Right not tender.  Left not tender.  Trapezius:  Right not tender.  Left not tender.    THORACIC PALPATION:  Thoracic Spinous Processes:  not tender.  Thoracic Paraspinals:  Right not tender.  Left not tender.  Levator Scapulae at the Scapular Insertion:  Right not tender.  Left not tender.  Rhomboid Minor:  Right not tender.  Left not tender.  Rhomboid Major:  Right not tender.  Left not tender.  Infraspinatus:  Right not tender.  Left not tender.  Teres Minor:  Right not tender.  Left not tender.    CERVICAL RANGE OF MOTION:  Forward Flexion (40 ): Moderately reduced range of motion, increases right greater than left posterior neck pain, does not cause radiating pain.  Extension (40 ):  Moderately reduced range of motion, increases right greater than left posterior neck pain, does not cause radiating pain.  Rotating Right (45 ): Severely reduced range of motion, increases right posterior neck pain, does not cause radiating pain.  Rotating Left (45 ):  Severely reduced range of motion, increases right posterior neck pain, does not  cause radiating pain.  Lateral Bending Right (40 ):  Severely reduced range of motion, increases right posterior neck pain, does not cause radiating pain.  Lateral Bending Left (40 ):  Severely reduced range of motion, increases right posterior neck pain, does not cause radiating pain.    SHOULDER RANGE OF MOTION:  Active Forward Flexion (180 ):  Right 130 .  Left 130 .  Active Extension (60 ):  Right 30 .  Left 30 .  Active Abduction (180 ):  Right 130 .  Left 130 .  Scapular Dyskinesis:  Right mild to moderate.  Left mild.    STRENGTH:  Shoulder abduction:  Right 5/5.  Left 5/5.  Elbow flexion:  Right 5/5.  Left 5/5.  Wrist extension:  Right 5/5.  Left 5/5.  Elbow extension:  Right 5/5.  Left 5/5.  Wrist flexion:  Right 5/5.  Left 5/5.  Finger flexion:  Right 5/5.  Left 5/5.  Finger abduction:  Right 5/5.  Left 5/5.    SENSATION:  Intact to light touch along right C3, C4, C5, C6, C7, C8, T1, T2.  Intact to light touch along left C3, C4, C5, C6, C7, C8, T1, T2.    REFLEXES:  Biceps (C5-C6):  Right 2+.  Left 2+.  Brachioradialis (C5-C6):  Right 2+.  Left 2+.  Triceps (C7-C8):  Right 2+.  Left 2+.  Baker's:  Right -.  Left -.    CERVICAL SPECIAL TESTS:  Facet Loading:  Right +.  Left -.  Spurling Neck Compression:  Right -.  Left -.    SHOULDER SPECIAL TESTS:  Drop Arm:  Right - . Left -.          IMAGING:  EXAM: CT CERVICAL SPINE W/O CONTRAST  LOCATION: [facility]  DATE: 1/31/2025     INDICATION: Trauma, remote last June, neck pain.  COMPARISON: None.  TECHNIQUE: Routine CT Cervical Spine without IV contrast. Multiplanar reformats. Dose reduction techniques were used.    IMPRESSION: Mild degenerative anterolisthesis of C4 upon C5. Alignment of the cervical vertebrae is otherwise normal. Vertebral body heights of the cervical spine are normal. Craniocervical alignment is normal. No fractures. There is loss of disc space height and degenerative endplate spurring at C5-C6 and C6-C7. Facet  arthropathy throughout the cervical spine. No high-grade spinal canal stenosis. No prevertebral soft tissue swelling.      I reviewed the CT cervical spine from 01/31/2025.  C1 and C2 appear to be fused between the anterior arch of C1 and the dens of C2.        ASSESSMENT/PLAN:  Ms. Tricia Sosa is an 84-year-old, right-hand-dominant, female.  She does have cervical degenerative disc disease, but the bulk of her posterior neck pain seems to be related to cervical facet arthropathy.  Symptoms are worse on her right than on her left.  Discussed diagnoses, pathophysiologies, and treatment options with Ms. Tricia Sosa.  Discussed the options of doing nothing/living with it, physical therapy, chiropractic care, oral medications such as gabapentin and pregabalin, cervical facet joint medial branch blocks with following radiofrequency ablations, referral to neurosurgery.  Given Ms. Tricia Sosa's difficulty with balance, would not want to start gabapentin or pregabalin.  If we were to pursue medial branch blocks with following radiofrequency ablations, would plan to target the right C2-C3 and C3-C4 facet joints versus the right C3-C4 and C4-C5 facet joints.  Ms. Tricia Sosa does not wish to pursue any treatment options for her neck pain at this time.  She will follow-up in this clinic for neck pain as needed.    Ms. Tricia Sosa is noting some right hip pain.  She will be scheduled for an appointment to be seen for this at her convenience.        Total time on the date of the encounter:  60 minutes were spent on one more or more of the following:  discussion with patient, history, exam, coordinating care, treatment goals, record review, documenting clinical information, and/or data review.    Consent was obtained from the patient to use an AI documentation tool in the creation of this note.      Gurjit Reza MD

## 2025-03-31 ENCOUNTER — MYC MEDICAL ADVICE (OUTPATIENT)
Dept: RHEUMATOLOGY | Facility: CLINIC | Age: 85
End: 2025-03-31
Payer: MEDICARE

## 2025-03-31 DIAGNOSIS — M19.049 HAND ARTHRITIS: ICD-10-CM

## 2025-04-01 RX ORDER — METHOTREXATE 2.5 MG/1
15 TABLET ORAL
Qty: 24 TABLET | Refills: 0 | Status: SHIPPED | OUTPATIENT
Start: 2025-04-01

## 2025-04-01 NOTE — TELEPHONE ENCOUNTER
LOV: 2/25  FOV: 4/25  Labs: 1/14 WNL, next labs: 4/18    Bilateral hand pain  Seronegative rheumatoid arthritis of multiple sites (H) Bilateral wrist pain    She is tolerating methotrexate and her methotrexate labs were stable last month. She is not sure if methotrexate is helping. I suggested staying on methotrexate for 2 more months, and April we will reassess the situation. At that point we can start methotrexate, if her joint pain, swelling gets worse after stopping methotrexate then that would be indirect prove that methotrexate was helping.

## 2025-04-11 ENCOUNTER — TELEPHONE (OUTPATIENT)
Dept: ORTHOPEDICS | Facility: CLINIC | Age: 85
End: 2025-04-11
Payer: MEDICARE

## 2025-04-11 DIAGNOSIS — M16.11 PRIMARY OSTEOARTHRITIS OF RIGHT HIP: Primary | ICD-10-CM

## 2025-04-11 DIAGNOSIS — M17.0 OSTEOARTHRITIS OF PATELLOFEMORAL JOINTS OF BOTH KNEES: ICD-10-CM

## 2025-04-11 NOTE — TELEPHONE ENCOUNTER
Upon chart review, Dr. Yeo already placed surgical referral for consult of bilateral knee arthroplasty. Patient can discuss both at her appt and can determine with the surgeon which knee she would prefer to proceed with first at that time.    Upon chart review, patient had MRI lumbar spine on 10/4/2023 at [facility].     Please see message below and advise regarding MRI lumbar spine vs MRI right hip.    Trinidad Torres, ATC

## 2025-04-11 NOTE — TELEPHONE ENCOUNTER
Tricia is calling in regarding prior conversation about seeing a surgeon for her knee. She stated R knee is in greater need than the Left. Could you put a order in so she can get scheduled?    She also mentioned that you had discussed getting an order for a MRI, but was thinking that may have been for the spine appt she has scheduled in May.   Was asking about that as well and if you wanted to order that to be done.    Tricia can be reached via [employer] or phone on file.    Thank you

## 2025-04-15 NOTE — PROGRESS NOTES
[facility]   ORTHOPEDIC SURGERY CLINIC  [place]  PATIENT:  Tricia Sosa  YOB: 1940  DATE OF SERVICE:  04/22/25    CHIEF COMPLAINT:  Bilateral knee pain with known osteoarthritis    PROCEDURES:  09/06/2022 - Right knee cortisone injection  10/04/2022 - Left knee cortisone injection  10/30/2023 - Bilateral knee cortisone injection  09/23/2024 - Bilateral knee cortisone injection  01/06/2025 - Bilateral knee cortisone injection  02/17/2025 - Bilateral knee Synvisc Once gel injection  02/17/2025 - Right hip joint injection    HISTORY OF PRESENT ILLNESS:  Tricia Sosa is a 84 year old female with history significant for autoimmune hemolytic anemia and IgA deficiency who complains of bilateral knee pain.  She feels her right knee is worse than her left.  She has established care with Dr. Yeo and Dr. Cardoso for conservative treatment of her knees.  She has had multiple cortisone injections to both knees, the last of which did not provide much relief for her.  She takes Tylenol and ibuprofen for pain, and had been attending formal physical therapy. She would like to discuss the possibility of surgery today.  She presents today with an electric wheelchair.  She began utilizing a wheelchair less than a week ago.  She states she does use a 4 wheeled walker at home, but has to put so much weight on her hands due to her lack of strength and balance.  She has pain to the anterior knee, mostly with her home exercises program.  She is treated by rheumatology.  She is currently receiving infusions to manage her autoimmune hemolytic anemia.    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, and posterior vitreous detachment    Urinary tract infection, site not specified     Recurrent UTI's        PREVIOUS SURGICAL HISTORY:  Past Surgical History:   Procedure Laterality Date    BIOPSY MUSCLE DIAGNOSTIC (LOCATION) Right 1/16/2024    Procedure: BIOPSY, MUSCLE - RIGHT THIGH;  Surgeon: Dickson Madrid MD;  Location: [facility] OR    COLONOSCOPY N/A 4/26/2016    Procedure: COLONOSCOPY;  Surgeon: Dontae Del Rio MD;  Location:  GI    COLONOSCOPY N/A 2/22/2024    Procedure: Colonoscopy with polypectomy by using cold snare and two hemoclips applied;  Surgeon: Dontae Del Rio MD;  Location:  GI    ENT SURGERY  1985    Thyroid lobectomy    ESOPHAGOSCOPY, GASTROSCOPY, DUODENOSCOPY (EGD), COMBINED N/A 2/22/2024    Procedure: ESOPHAGOGASTRODUODENOSCOPY, WITH BIOPSY by using cold forcep;  Surgeon: Dontae Del Rio MD;  Location:  GI    EYE SURGERY Bilateral 04/20/2021    Cataract Surgery    HC CYSTOSCOPY,INSERT URETERAL STENT      Ureteral stent insertion    HC FLEX SIGMOIDOSCOPY W BIOPSY  5/30/00    HC LAPAROSCOPY, SURGICAL; CHOLECYSTECTOMY  1992     THYROIDECTOMY      LIGATION OF HEMORRHOID(S)      Hemorrhoidectomy    UPPER GI ENDOSCOPY,BIOPSY  10/01/01    ZZC LIGATE FALLOPIAN TUBE      ZZHC COLONOSCOPY W BIOPSY  4/26/00    ZZHC COLONOSCOPY W BIOPSY  10/8/03    ZZHC COLONOSCOPY W BIOPSY  6/27/05    REPEAT IN 5 YEARS       SOCIAL HISTORY:  Social History     Socioeconomic History    Marital status:      Spouse name: Not on file    Number of children: Not on file    Years of education: Not on file    Highest education level: Not on file   Occupational History    Not on file    Tobacco Use    Smoking status: Never     Passive exposure: Never    Smokeless tobacco: Never   Vaping Use    Vaping status: Never Used   Substance and Sexual Activity    Alcohol use: Not Currently     Comment: 1 a day at most    Drug use: No    Sexual activity: Not Currently     Partners: Male   Other Topics Concern    Parent/sibling w/ CABG, MI or angioplasty before 65F 55M? No   Social History Narrative    Not on file     Social Drivers of Health     Financial Resource Strain: Low Risk  (1/5/2024)    Financial Resource Strain     Within the past 12 months, have you or your family members you live with been unable to get utilities (heat, electricity) when it was really needed?: No   Food Insecurity: Low Risk  (1/5/2024)    Food Insecurity     Within the past 12 months, did you worry that your food would run out before you got money to buy more?: No     Within the past 12 months, did the food you bought just not last and you didn t have money to get more?: No   Transportation Needs: Low Risk  (1/5/2024)    Transportation Needs     Within the past 12 months, has lack of transportation kept you from medical appointments, getting your medicines, non-medical meetings or appointments, work, or from getting things that you need?: No   Physical Activity: Not on file   Stress: Not on file   Social Connections: Not on file   Interpersonal Safety: Low Risk  (10/24/2024)    Interpersonal Safety     Do you feel physically and emotionally safe where you currently live?: Yes     Within the past 12 months, have you been hit, slapped, kicked or otherwise physically hurt by someone?: No     Within the past 12 months, have you been humiliated or emotionally abused in other ways by your partner or ex-partner?: No   Housing Stability: Low Risk  (1/5/2024)    Housing Stability     Do you have housing? : Yes     Are you worried about losing your housing?: No       ALLERGIES:  Allergies   Allergen Reactions    Doxycycline          MEDICATIONS:  Current Outpatient Medications   Medication Sig Dispense Refill    cyanocobalamin (VITAMIN  B-12) 1000 MCG tablet   3    folic acid (FOLVITE) 1 MG tablet   3    hydroCHLOROthiazide 12.5 MG tablet TAKE 1 TABLET AS NEEDED FOR EDEMA 30 tablet 2    ketoconazole (NIZORAL) 2 % external shampoo Use every other day to scalp as needed 120 mL 0    levothyroxine (SYNTHROID/LEVOTHROID) 150 MCG tablet Take 1 tablet (150 mcg) by mouth daily 90 tablet 3    methotrexate 2.5 MG tablet Take 6 tablets (15 mg) by mouth every 7 days. 24 tablet 0    predniSONE (DELTASONE) 5 MG tablet Take 1 tablet (5 mg) by mouth 2 times daily. (Patient not taking: Reported on 2/25/2025) 60 tablet 1    triamcinolone (KENALOG) 0.1 % external ointment Apply topically 2 times daily       No current facility-administered medications for this visit.        PHYSICAL EXAM:  Awake alert and oriented in no apparent distress.  Focused exam of the bilateral lower extremities demonstrates valgus deformity.    Skin is clean, dry and intact.   No erythema, warmth, ecchymosis or swelling.    No lymphedema.    No knee effusion.  No tenderness to palpation.  Range of motion:  Right:  Unable to perform full straight leg raise or maintain a straight leg raise  Knee: extension 20  actively 0  passively, flexion 130    Ankle: dorsiflexion 20 , plantarflexion 50 , subtalar motion normal  Left:  Unable to perform full straight leg raise or maintain a straight leg raise  Knee: extension 20  actively 0  passively, flexion 130   Ankle: dorsiflexion 20 , plantarflexion 50 , subtalar motion normal  Knees stable to anterior, posterior, varus and valgus stress  Ankles stable to anterior drawer.    CMS intact distally.  Intact sensation in lateral femoral cutaneous, saphenous, sural, superficial peroneal, deep peroneal and tibial distributions.    Motor intact in quadriceps, hamstrings, abductors, tibialis anterior, extensor hallucis longus, and gastrocsoleus however  strength is 3-4/5 throughout.   Pedal pulses intact and capillary refill brisk.    IMAGING:  Bilateral knee AP, Keating, lateral and sunrise views obtained today personally viewed by me  Comparisons: 11/8/2024, 10/4/2022  Progression of bilateral knee osteoarthritis with advanced patellofemoral degenerative changes and moderate lateral compartment degenerative changes.  No acute fractures  No obvious lesions  No osteomyelitis  Osteopenia.    LABS:  None    ASSESSMENT:  Bilateral knee osteoarthritis    PLAN:  Long detailed discussion with the patient as well as her son and daughter-in-law who are present for the entire history and physical.  Given her overall situation I would not recommend surgical intervention with total knee arthroplasty given the unacceptable risk profile as well as her inability to significantly participate in postoperative rehabilitation.  I advised that I do not feel that total knee arthroplasty would improve her situation in any significant way and that it could be significantly detrimental if she were to inadequately rehabilitate or have a postoperative complication such as infection.  Her muscle weakness would also make her risk for fall.  Nonoperative management options for osteoarthritis were discussed in great detail with the patient. These include Physical Therapy with neuromuscular training (i.e. stretching, strengthening, balance, agility, coordination) programs in combination with traditional exercise, weight loss, assistive devices including canes or walkers, oral and topical NSAIDs, acetaminophen, and intra-articular corticosteroid injections.  At this point the patient would like to proceed with physical therapy for bilateral lower extremity strengthening as well as a referral to nutrition and attempt to stem her weight loss and improve her overall nutrition so she may more effectively strengthen her bilateral lower extremities.  She will continue to have her multiple joint  pains managed by rheumatology medically.  I would see the patient back on as-needed basis.    X-RAYS NEXT VISIT:  To be determined    Andrew Pérez MD, FAAOS    Orthopedic Trauma  Adult Reconstruction  Department Orthopedic Surgery  [facility]

## 2025-04-17 ASSESSMENT — KOOS JR
KOOS JR SCORING: 34.17
HOW SEVERE IS YOUR KNEE STIFFNESS AFTER FIRST WAKING IN MORNING: SEVERE
GOING UP OR DOWN STAIRS: SEVERE
STANDING UPRIGHT: MODERATE
STRAIGHTENING KNEE FULLY: MODERATE
TWISING OR PIVOTING ON KNEE: SEVERE
RISING FROM SITTING: EXTREME
BENDING TO THE FLOOR TO PICK UP OBJECT: EXTREME

## 2025-04-18 PROCEDURE — 82728 ASSAY OF FERRITIN: CPT | Performed by: INTERNAL MEDICINE

## 2025-04-18 PROCEDURE — 82040 ASSAY OF SERUM ALBUMIN: CPT | Performed by: INTERNAL MEDICINE

## 2025-04-18 PROCEDURE — 85004 AUTOMATED DIFF WBC COUNT: CPT | Performed by: INTERNAL MEDICINE

## 2025-04-18 PROCEDURE — 82565 ASSAY OF CREATININE: CPT | Performed by: INTERNAL MEDICINE

## 2025-04-18 PROCEDURE — 84460 ALANINE AMINO (ALT) (SGPT): CPT | Performed by: INTERNAL MEDICINE

## 2025-04-21 ENCOUNTER — INFUSION THERAPY VISIT (OUTPATIENT)
Dept: INFUSION THERAPY | Facility: CLINIC | Age: 85
End: 2025-04-21
Attending: PHYSICIAN ASSISTANT
Payer: MEDICARE

## 2025-04-21 VITALS
HEART RATE: 82 BPM | RESPIRATION RATE: 16 BRPM | BODY MASS INDEX: 19.72 KG/M2 | TEMPERATURE: 97.9 F | DIASTOLIC BLOOD PRESSURE: 71 MMHG | OXYGEN SATURATION: 96 % | SYSTOLIC BLOOD PRESSURE: 113 MMHG | WEIGHT: 141.4 LBS

## 2025-04-21 DIAGNOSIS — D80.3 SELECTIVE DEFICIENCY OF IGG SUBCLASSES (H): Primary | ICD-10-CM

## 2025-04-21 DIAGNOSIS — D83.9 CVID (COMMON VARIABLE IMMUNODEFICIENCY) (H): ICD-10-CM

## 2025-04-21 DIAGNOSIS — D59.10 AUTOIMMUNE HEMOLYTIC ANEMIA (H): ICD-10-CM

## 2025-04-21 DIAGNOSIS — D59.19 OTHER AUTOIMMUNE HEMOLYTIC ANEMIA (H): ICD-10-CM

## 2025-04-21 PROCEDURE — 250N000013 HC RX MED GY IP 250 OP 250 PS 637: Performed by: PHYSICIAN ASSISTANT

## 2025-04-21 PROCEDURE — 96415 CHEMO IV INFUSION ADDL HR: CPT

## 2025-04-21 PROCEDURE — 96413 CHEMO IV INFUSION 1 HR: CPT

## 2025-04-21 PROCEDURE — 96366 THER/PROPH/DIAG IV INF ADDON: CPT

## 2025-04-21 PROCEDURE — 250N000011 HC RX IP 250 OP 636: Performed by: PHYSICIAN ASSISTANT

## 2025-04-21 PROCEDURE — 258N000003 HC RX IP 258 OP 636: Performed by: PHYSICIAN ASSISTANT

## 2025-04-21 PROCEDURE — 96367 TX/PROPH/DG ADDL SEQ IV INF: CPT

## 2025-04-21 PROCEDURE — 96375 TX/PRO/DX INJ NEW DRUG ADDON: CPT

## 2025-04-21 RX ORDER — HEPARIN SODIUM (PORCINE) LOCK FLUSH IV SOLN 100 UNIT/ML 100 UNIT/ML
5 SOLUTION INTRAVENOUS
OUTPATIENT
Start: 2025-04-22

## 2025-04-21 RX ORDER — EPINEPHRINE 1 MG/ML
0.3 INJECTION, SOLUTION INTRAMUSCULAR; SUBCUTANEOUS EVERY 5 MIN PRN
OUTPATIENT
Start: 2025-04-22

## 2025-04-21 RX ORDER — DIPHENHYDRAMINE HYDROCHLORIDE 50 MG/ML
25 INJECTION, SOLUTION INTRAMUSCULAR; INTRAVENOUS
Start: 2025-04-22

## 2025-04-21 RX ORDER — METHYLPREDNISOLONE SODIUM SUCCINATE 40 MG/ML
20 INJECTION INTRAMUSCULAR; INTRAVENOUS ONCE
Start: 2025-04-22 | End: 2025-04-22

## 2025-04-21 RX ORDER — HEPARIN SODIUM,PORCINE 10 UNIT/ML
5 VIAL (ML) INTRAVENOUS
OUTPATIENT
Start: 2025-04-22

## 2025-04-21 RX ORDER — MEPERIDINE HYDROCHLORIDE 25 MG/ML
25 INJECTION INTRAMUSCULAR; INTRAVENOUS; SUBCUTANEOUS
OUTPATIENT
Start: 2025-04-22

## 2025-04-21 RX ORDER — ALBUTEROL SULFATE 90 UG/1
1-2 INHALANT RESPIRATORY (INHALATION)
Start: 2025-04-22

## 2025-04-21 RX ORDER — DIPHENHYDRAMINE HCL 25 MG
50 CAPSULE ORAL ONCE
Status: COMPLETED | OUTPATIENT
Start: 2025-04-21 | End: 2025-04-21

## 2025-04-21 RX ORDER — METHYLPREDNISOLONE SODIUM SUCCINATE 40 MG/ML
20 INJECTION INTRAMUSCULAR; INTRAVENOUS ONCE
Status: COMPLETED | OUTPATIENT
Start: 2025-04-21 | End: 2025-04-21

## 2025-04-21 RX ORDER — DIPHENHYDRAMINE HYDROCHLORIDE 50 MG/ML
50 INJECTION, SOLUTION INTRAMUSCULAR; INTRAVENOUS
Start: 2025-04-22

## 2025-04-21 RX ORDER — ALBUTEROL SULFATE 0.83 MG/ML
2.5 SOLUTION RESPIRATORY (INHALATION)
OUTPATIENT
Start: 2025-04-22

## 2025-04-21 RX ORDER — METHYLPREDNISOLONE SODIUM SUCCINATE 40 MG/ML
40 INJECTION INTRAMUSCULAR; INTRAVENOUS
Start: 2025-04-22

## 2025-04-21 RX ADMIN — HUMAN IMMUNOGLOBULIN G 35 G: 20 LIQUID INTRAVENOUS at 11:34

## 2025-04-21 RX ADMIN — METHYLPREDNISOLONE SODIUM SUCCINATE 20 MG: 40 INJECTION, POWDER, FOR SOLUTION INTRAMUSCULAR; INTRAVENOUS at 11:22

## 2025-04-21 RX ADMIN — RITUXIMAB-ABBS 700 MG: 10 INJECTION, SOLUTION INTRAVENOUS at 13:51

## 2025-04-21 RX ADMIN — DIPHENHYDRAMINE HYDROCHLORIDE 25 MG: 25 CAPSULE ORAL at 11:29

## 2025-04-21 NOTE — PROGRESS NOTES
Infusion Nursing Note:  Tricia Sosa presents today for IVIG+Truxima-rapid.    Patient seen by provider today: No, saw Isaiah VOGEL PA-C on 4/18.   present during visit today: Not Applicable.    Note: IVIG started at 0.5 ml/kg/hr x 15 minutes and increased by 0.5 ml/kg/hr every 15 minutes til max rate of 4 ml/kg/hr.     20 mg IV Solumedrol given prior to IVIG. 25 mg Benadryl given awhile before Truxima. Pt declined Tylenol today as she took 1000 mg Tylenol this morning.       Intravenous Access:  Peripheral IV placed.    Treatment Conditions:  Results reviewed, labs MET treatment parameters, ok to proceed with treatment.     [id number]    Pt denies reactions, fevers, feeling ill, recent major or local infections, on abx, productive cough, recent surgery or elevated temperature.      Post Infusion Assessment:  Patient tolerated infusion without incident.  Blood return noted pre and post infusion.  Site patent and intact, free from redness, edema or discomfort.  No evidence of extravasations.  Access discontinued per protocol.       Discharge Plan:   Discharge instructions reviewed with: Patient.  Patient and/or family verbalized understanding of discharge instructions and all questions answered.  AVS to patient via [employer].  Patient will return in approx 8 weeks for next appointment.   Patient discharged in stable condition accompanied by: self.  Departure Mode: Ambulatory.      Monika Hodge RN

## 2025-04-22 ENCOUNTER — OFFICE VISIT (OUTPATIENT)
Dept: ORTHOPEDICS | Facility: CLINIC | Age: 85
End: 2025-04-22
Attending: FAMILY MEDICINE
Payer: MEDICARE

## 2025-04-22 ENCOUNTER — ANCILLARY PROCEDURE (OUTPATIENT)
Dept: GENERAL RADIOLOGY | Facility: CLINIC | Age: 85
End: 2025-04-22
Attending: ORTHOPAEDIC SURGERY
Payer: MEDICARE

## 2025-04-22 VITALS — WEIGHT: 141 LBS | HEIGHT: 71 IN | BODY MASS INDEX: 19.74 KG/M2

## 2025-04-22 DIAGNOSIS — M17.0 OSTEOARTHRITIS OF PATELLOFEMORAL JOINTS OF BOTH KNEES: Primary | ICD-10-CM

## 2025-04-22 DIAGNOSIS — R63.4 LOSS OF WEIGHT: ICD-10-CM

## 2025-04-22 DIAGNOSIS — M17.0 OSTEOARTHRITIS OF PATELLOFEMORAL JOINTS OF BOTH KNEES: ICD-10-CM

## 2025-04-22 DIAGNOSIS — L30.9 DERMATITIS: ICD-10-CM

## 2025-04-22 PROCEDURE — 99214 OFFICE O/P EST MOD 30 MIN: CPT | Performed by: ORTHOPAEDIC SURGERY

## 2025-04-22 NOTE — LETTER
4/22/2025      Tricia Sosa  [address]  [place] [address]      Dear Colleague,    Thank you for referring your patient, Tricia Sosa, to the [facility] ORTH[facility] [place]. Please see a copy of my visit note below.    [facility] PHYSICIANS   [facility]   ORTHOPEDIC SURGERY CLINIC  [place]  PATIENT:  Tricia Sosa  YOB: 1940  DATE OF SERVICE:  04/22/25    CHIEF COMPLAINT:  Bilateral knee pain with known osteoarthritis    PROCEDURES:  09/06/2022 - Right knee cortisone injection  10/04/2022 - Left knee cortisone injection  10/30/2023 - Bilateral knee cortisone injection  09/23/2024 - Bilateral knee cortisone injection  01/06/2025 - Bilateral knee cortisone injection  02/17/2025 - Bilateral knee Synvisc Once gel injection  02/17/2025 - Right hip joint injection    HISTORY OF PRESENT ILLNESS:  Tricia Sosa is a 84 year old female with history significant for autoimmune hemolytic anemia and IgA deficiency who complains of bilateral knee pain.  She feels her right knee is worse than her left.  She has established care with Dr. Yeo and Dr. Cardoso for conservative treatment of her knees.  She has had multiple cortisone injections to both knees, the last of which did not provide much relief for her.  She takes Tylenol and ibuprofen for pain, and had been attending formal physical therapy. She would like to discuss the possibility of surgery today.  She presents today with an electric wheelchair.  She began utilizing a wheelchair less than a week ago.  She states she does use a 4 wheeled walker at home, but has to put so much weight on her hands due to her lack of strength and balance.  She has pain to the anterior knee, mostly with her home exercises program.  She is treated by rheumatology.  She is currently receiving infusions to manage her autoimmune hemolytic anemia.    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    Hemorrhage Subdural Trauma Without LOC Active     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, and posterior vitreous detachment     Urinary tract infection, site not specified     Recurrent UTI's        PREVIOUS SURGICAL HISTORY:  Past Surgical History:   Procedure Laterality Date     BIOPSY MUSCLE DIAGNOSTIC (LOCATION) Right 1/16/2024    Procedure: BIOPSY, MUSCLE - RIGHT THIGH;  Surgeon: Dickson Madrid MD;  Location: [facility] OR     COLONOSCOPY N/A 4/26/2016    Procedure: COLONOSCOPY;  Surgeon: Dontae Del Rio MD;  Location:  GI     COLONOSCOPY N/A 2/22/2024    Procedure: Colonoscopy with polypectomy by using cold snare and two hemoclips applied;  Surgeon: Dontae Del Rio MD;  Location:  GI     ENT SURGERY  1985    Thyroid lobectomy     ESOPHAGOSCOPY, GASTROSCOPY, DUODENOSCOPY (EGD), COMBINED N/A 2/22/2024    Procedure: ESOPHAGOGASTRODUODENOSCOPY, WITH BIOPSY by using cold forcep;  Surgeon: Dontae Del Rio MD;  Location:  GI     EYE SURGERY Bilateral 04/20/2021    Cataract Surgery     HC CYSTOSCOPY,INSERT URETERAL STENT      Ureteral stent insertion     HC FLEX SIGMOIDOSCOPY W BIOPSY  5/30/00      LAPAROSCOPY, SURGICAL; CHOLECYSTECTOMY  1992      THYROIDECTOMY       LIGATION OF HEMORRHOID(S)      Hemorrhoidectomy     UPPER GI ENDOSCOPY,BIOPSY  10/01/01     [facility] LIGATE FALLOPIAN TUBE       ZZ COLONOSCOPY W BIOPSY  4/26/00     ZZ COLONOSCOPY W BIOPSY  10/8/03     ZZ COLONOSCOPY W BIOPSY  6/27/05     REPEAT IN 5 YEARS       SOCIAL HISTORY:  Social History     Socioeconomic History     Marital status:      Spouse name: Not on file     Number of children: Not on file     Years of education: Not on file     Highest education level: Not on file   Occupational History     Not on file   Tobacco Use     Smoking status: Never     Passive exposure: Never     Smokeless tobacco: Never   Vaping Use     Vaping status: Never Used   Substance and Sexual Activity     Alcohol use: Not Currently     Comment: 1 a day at most     Drug use: No     Sexual activity: Not Currently     Partners: Male   Other Topics Concern     Parent/sibling w/ CABG, MI or angioplasty before 65F 55M? No   Social History Narrative     Not on file     Social Drivers of Health     Financial Resource Strain: Low Risk  (1/5/2024)    Financial Resource Strain      Within the past 12 months, have you or your family members you live with been unable to get utilities (heat, electricity) when it was really needed?: No   Food Insecurity: Low Risk  (1/5/2024)    Food Insecurity      Within the past 12 months, did you worry that your food would run out before you got money to buy more?: No      Within the past 12 months, did the food you bought just not last and you didn t have money to get more?: No   Transportation Needs: Low Risk  (1/5/2024)    Transportation Needs      Within the past 12 months, has lack of transportation kept you from medical appointments, getting your medicines, non-medical meetings or appointments, work, or from getting things that you need?: No   Physical Activity: Not on file   Stress: Not on file   Social Connections: Not on file   Interpersonal Safety: Low Risk  (10/24/2024)    Interpersonal Safety      Do you feel physically and emotionally safe where you currently live?: Yes      Within the past 12 months, have you been hit, slapped, kicked or otherwise physically hurt by someone?: No      Within the past 12 months, have you been  humiliated or emotionally abused in other ways by your partner or ex-partner?: No   Housing Stability: Low Risk  (1/5/2024)    Housing Stability      Do you have housing? : Yes      Are you worried about losing your housing?: No       ALLERGIES:  Allergies   Allergen Reactions     Doxycycline         MEDICATIONS:  Current Outpatient Medications   Medication Sig Dispense Refill     cyanocobalamin (VITAMIN  B-12) 1000 MCG tablet   3     folic acid (FOLVITE) 1 MG tablet   3     hydroCHLOROthiazide 12.5 MG tablet TAKE 1 TABLET AS NEEDED FOR EDEMA 30 tablet 2     ketoconazole (NIZORAL) 2 % external shampoo Use every other day to scalp as needed 120 mL 0     levothyroxine (SYNTHROID/LEVOTHROID) 150 MCG tablet Take 1 tablet (150 mcg) by mouth daily 90 tablet 3     methotrexate 2.5 MG tablet Take 6 tablets (15 mg) by mouth every 7 days. 24 tablet 0     predniSONE (DELTASONE) 5 MG tablet Take 1 tablet (5 mg) by mouth 2 times daily. (Patient not taking: Reported on 2/25/2025) 60 tablet 1     triamcinolone (KENALOG) 0.1 % external ointment Apply topically 2 times daily       No current facility-administered medications for this visit.        PHYSICAL EXAM:  Awake alert and oriented in no apparent distress.  Focused exam of the bilateral lower extremities demonstrates valgus deformity.    Skin is clean, dry and intact.   No erythema, warmth, ecchymosis or swelling.    No lymphedema.    No knee effusion.  No tenderness to palpation.  Range of motion:  Right:  Unable to perform full straight leg raise or maintain a straight leg raise  Knee: extension 20  actively 0  passively, flexion 130    Ankle: dorsiflexion 20 , plantarflexion 50 , subtalar motion normal  Left:  Unable to perform full straight leg raise or maintain a straight leg raise  Knee: extension 20  actively 0  passively, flexion 130   Ankle: dorsiflexion 20 , plantarflexion 50 , subtalar motion normal  Knees stable to anterior, posterior, varus and valgus  stress  Ankles stable to anterior drawer.    CMS intact distally.  Intact sensation in lateral femoral cutaneous, saphenous, sural, superficial peroneal, deep peroneal and tibial distributions.    Motor intact in quadriceps, hamstrings, abductors, tibialis anterior, extensor hallucis longus, and gastrocsoleus however strength is 3-4/5 throughout.   Pedal pulses intact and capillary refill brisk.    IMAGING:  Bilateral knee AP, Keating, lateral and sunrise views obtained today personally viewed by me  Comparisons: 11/8/2024, 10/4/2022  Progression of bilateral knee osteoarthritis with advanced patellofemoral degenerative changes and moderate lateral compartment degenerative changes.  No acute fractures  No obvious lesions  No osteomyelitis  Osteopenia.    LABS:  None    ASSESSMENT:  Bilateral knee osteoarthritis    PLAN:  Long detailed discussion with the patient as well as her son and daughter-in-law who are present for the entire history and physical.  Given her overall situation I would not recommend surgical intervention with total knee arthroplasty given the unacceptable risk profile as well as her inability to significantly participate in postoperative rehabilitation.  I advised that I do not feel that total knee arthroplasty would improve her situation in any significant way and that it could be significantly detrimental if she were to inadequately rehabilitate or have a postoperative complication such as infection.  Her muscle weakness would also make her risk for fall.  Nonoperative management options for osteoarthritis were discussed in great detail with the patient. These include Physical Therapy with neuromuscular training (i.e. stretching, strengthening, balance, agility, coordination) programs in combination with traditional exercise, weight loss, assistive devices including canes or walkers, oral and topical NSAIDs, acetaminophen, and intra-articular corticosteroid injections.  At this point the  patient would like to proceed with physical therapy for bilateral lower extremity strengthening as well as a referral to nutrition and attempt to stem her weight loss and improve her overall nutrition so she may more effectively strengthen her bilateral lower extremities.  She will continue to have her multiple joint pains managed by rheumatology medically.  I would see the patient back on as-needed basis.    X-RAYS NEXT VISIT:  To be determined    Andrew Pérez MD, FAAOS    Orthopedic Trauma  Adult Reconstruction  Department Orthopedic Surgery  [facility]      Again, thank you for allowing me to participate in the care of your patient.        Sincerely,        Andrew Pérez MD    Electronically signed

## 2025-04-22 NOTE — TELEPHONE ENCOUNTER
Leobardo with Dr. Yeo. Recommends patient proceed with right hip MRI as scheduled tomorrow. Patient should discuss necessity of low back [facility].    Patient also sent [employer] message on 4/18/25 requesting an order for a wheelchair. DME order placed for wheelchair.    Patient notified of all information via [employer] message dated 4/18/25.    Trinidad Torres, ATC

## 2025-04-24 RX ORDER — KETOCONAZOLE 20 MG/ML
SHAMPOO, SUSPENSION TOPICAL
Qty: 120 ML | Refills: 0 | Status: SHIPPED | OUTPATIENT
Start: 2025-04-24

## 2025-04-27 ENCOUNTER — HOSPITAL ENCOUNTER (OUTPATIENT)
Dept: MRI IMAGING | Facility: CLINIC | Age: 85
Discharge: HOME | End: 2025-04-27
Attending: FAMILY MEDICINE | Admitting: FAMILY MEDICINE
Payer: MEDICARE

## 2025-04-27 DIAGNOSIS — M16.11 PRIMARY OSTEOARTHRITIS OF RIGHT HIP: ICD-10-CM

## 2025-04-27 PROCEDURE — 73721 MRI JNT OF LWR EXTRE W/O DYE: CPT | Mod: 26 | Performed by: RADIOLOGY

## 2025-04-27 PROCEDURE — 73721 MRI JNT OF LWR EXTRE W/O DYE: CPT | Mod: RT

## 2025-04-29 ENCOUNTER — VIRTUAL VISIT (OUTPATIENT)
Dept: ORTHOPEDICS | Facility: CLINIC | Age: 85
End: 2025-04-29
Payer: MEDICARE

## 2025-04-29 DIAGNOSIS — M47.27 LUMBOSACRAL SPONDYLOSIS WITH RADICULOPATHY: ICD-10-CM

## 2025-04-29 DIAGNOSIS — M16.11 PRIMARY OSTEOARTHRITIS OF RIGHT HIP: ICD-10-CM

## 2025-04-29 DIAGNOSIS — M25.551 ACUTE RIGHT HIP PAIN: Primary | ICD-10-CM

## 2025-04-29 PROCEDURE — 98005 SYNCH AUDIO-VIDEO EST LOW 20: CPT | Performed by: FAMILY MEDICINE

## 2025-04-29 RX ORDER — GABAPENTIN 100 MG/1
100 CAPSULE ORAL AT BEDTIME
Qty: 30 CAPSULE | Refills: 0 | Status: SHIPPED | OUTPATIENT
Start: 2025-04-29

## 2025-04-29 NOTE — PROGRESS NOTES
Tricia is a 84 year old who is being evaluated via a billable video visit.    How would you like to obtain your AVS? MyChart  If the video visit is dropped, the invitation should be resent by: Send to e-mail at: [email or web address]  Will anyone else be joining your video visit? No    Video-Visit Details    Type of service:  Video Visit   Video End Time: 2:54PM  Originating Location (pt. Location): Home    Distant Location (provider location):  On-site  Platform used for Video Visit: Devtap    ASSESSMENT & PLAN  Patient Instructions     1. Acute right hip pain    2. Primary osteoarthritis of right hip    3. Lumbosacral spondylosis with radiculopathy      - Patient is following up for right hip pain due to arthritis and likely radiation from the lumbar spine  -MRI results of the right hip was reviewed today shows severe arthritis with complete joint space narrowing, bone spurs and full-thickness cartilage loss.  MRI results were explained and all questions were answered  -Patient states the majority of her pain is at night time when she is sleeping.  Patient states she has minimal pain while standing and walking.  -Patient may be experiencing hip pain radiating from the lumbar spine.  -Patient will get an MRI of the lumbar spine for evaluation of lumbar stenosis causing nerve root impingement  - Your MRI has been ordered. You may call [phone] to schedule over the phone.    -Patient is already scheduled to see Dr. Reza in 3 weeks who can review her MRI results and determine next of treatment options  -Call direct clinic number [[phone]] at any time with questions or concerns.    Albert Yeo MD [facility] Orthopedics and Sports Medicine  [facility] [place]        -----    SUBJECTIVE:  Tricia Sosa is a 84 year old female who is seen in follow-up for right hip pain.They were last seen by Dr. Yeo on 02/17/25.     Since their last visit reports 0% - (About the same as last time). They indicate  that their current pain level is 4/10. They have tried rest/activity avoidance, previous imaging (MRI 04/27/25), and corticosteroid injection (most recent date: 02/17/25) that provided no noticeable relief.      The patient is seen by themselves.    Patient's past medical, surgical, social, and family histories were reviewed today and no changes are noted.    REVIEW OF SYSTEMS:  Constitutional: NEGATIVE for fever, chills, change in weight  Skin: NEGATIVE for worrisome rashes, moles or lesions  GI/: NEGATIVE for bowel or bladder changes  Neuro: NEGATIVE for weakness, dizziness or paresthesias    OBJECTIVE:  LMP  (LMP Unknown)    General: healthy, alert and in no distress  HEENT: no scleral icterus or conjunctival erythema  Skin: no suspicious lesions or rash. No jaundice.  CV: regular rhythm by palpation, no pedal edema  Resp: normal respiratory effort without conversational dyspnea   Psych: normal mood and affect  Gait: normal steady gait with appropriate coordination and balance  Neuro: normal light touch sensory exam of the extremities.    MSK:    Independent visualization of the below image:  Results for orders placed or performed during the hospital encounter of 04/27/25   MR Hip Right w/o Contrast    Narrative    Exam: MRI of the right hip dated 4/27/2025.    COMPARISON: CT dated 1/23/2024.    CLINICAL HISTORY: Osteoarthrosis.    TECHNIQUE: Multiplanar, multisequence MR imaging of the right hip was  obtained using standard sequences in 3 orthogonal planes without the  use of intravenous or intra-articular gadolinium contrast.    FINDINGS:    Small to moderate amount of fluid in the right hip joint with  synovitis.    Prominent red marrow within the visualized pelvis without marrow  signal abnormalities to suggest fracture, osteonecrosis, or marrow  infiltration.    Marked osteoarthrosis in the right hip joint with marked complete  joint space narrowing, high-grade to full-thickness cartilage  loss,  degenerative labral tearing, as well as osteophytic spurring at the  femoral head and neck junction.    No full-thickness tendon tear or tendon retraction. Mild fatty  infiltration within the gluteus minimus muscle and vastus lateralis  muscle. Subcutaneous soft tissue edema. Fluid in the region of the  trochanteric bursae. The visualized sciatic nerve is unremarkable. The  visualized femoral vasculature is unremarkable. No lymphadenopathy.      Impression    IMPRESSION:  1. Marked osteoarthrosis in the right hip joint with complete joint  space narrowing, osteophytic spurring, high-grade to full-thickness  cartilage loss and subchondral edema and cystic changes.    2. Small right hip joint effusion with synovitis.    3. Prominent red marrow without evidence of fracture, osteonecrosis,  or marrow infiltration.    RAY MCCRACKEN MD         SYSTEM ID:  [id number]     *Note: Due to a large number of results and/or encounters for the requested time period, some results have not been displayed. A complete set of results can be found in Results Review.         Patient's conditions were thoroughly discussed during today's visit with total time spent face-to-face with the patient and documentation being 22 minutes.    Albert Yeo MD, [facility] Sports and Orthopedic Care

## 2025-04-29 NOTE — LETTER
4/29/2025      Tricia Sosa  [id number]  [place] [id number]      Dear Colleague,    Thank you for referring your patient, Tricia Sosa, to the [facility] [place]. Please see a copy of my visit note below.    Tricia is a 84 year old who is being evaluated via a billable video visit.    How would you like to obtain your AVS? MyChart  If the video visit is dropped, the invitation should be resent by: Send to e-mail at: [email or web address]  Will anyone else be joining your video visit? No    Video-Visit Details    Type of service:  Video Visit   Video End Time: 2:54PM  Originating Location (pt. Location): Home    Distant Location (provider location):  On-site  Platform used for Video Visit: [employer] Learning    ASSESSMENT & PLAN  Patient Instructions     1. Acute right hip pain    2. Primary osteoarthritis of right hip    3. Lumbosacral spondylosis with radiculopathy      - Patient is following up for right hip pain due to arthritis and likely radiation from the lumbar spine  -MRI results of the right hip was reviewed today shows severe arthritis with complete joint space narrowing, bone spurs and full-thickness cartilage loss.  MRI results were explained and all questions were answered  -Patient states the majority of her pain is at night time when she is sleeping.  Patient states she has minimal pain while standing and walking.  -Patient may be experiencing hip pain radiating from the lumbar spine.  -Patient will get an MRI of the lumbar spine for evaluation of lumbar stenosis causing nerve root impingement  - Your MRI has been ordered. You may call [phone] to schedule over the phone.    -Patient is already scheduled to see Dr. Reza in 3 weeks who can review her MRI results and determine next of treatment options  -Call direct clinic number [[phone]] at any time with questions or concerns.    Albert Yeo MD [facility] Orthopedics and Sports Medicine  [facility]        -----    SUBJECTIVE:  Tricia Sosa is a 84 year old female who is seen in follow-up for right hip pain.They were last seen by Dr. Yeo on 02/17/25.     Since their last visit reports 0% - (About the same as last time). They indicate that their current pain level is 4/10. They have tried rest/activity avoidance, previous imaging (MRI 04/27/25), and corticosteroid injection (most recent date: 02/17/25) that provided no noticeable relief.      The patient is seen by themselves.    Patient's past medical, surgical, social, and family histories were reviewed today and no changes are noted.    REVIEW OF SYSTEMS:  Constitutional: NEGATIVE for fever, chills, change in weight  Skin: NEGATIVE for worrisome rashes, moles or lesions  GI/: NEGATIVE for bowel or bladder changes  Neuro: NEGATIVE for weakness, dizziness or paresthesias    OBJECTIVE:  LMP  (LMP Unknown)    General: healthy, alert and in no distress  HEENT: no scleral icterus or conjunctival erythema  Skin: no suspicious lesions or rash. No jaundice.  CV: regular rhythm by palpation, no pedal edema  Resp: normal respiratory effort without conversational dyspnea   Psych: normal mood and affect  Gait: normal steady gait with appropriate coordination and balance  Neuro: normal light touch sensory exam of the extremities.    MSK:    Independent visualization of the below image:  Results for orders placed or performed during the hospital encounter of 04/27/25   MR Hip Right w/o Contrast    Narrative    Exam: MRI of the right hip dated 4/27/2025.    COMPARISON: CT dated 1/23/2024.    CLINICAL HISTORY: Osteoarthrosis.    TECHNIQUE: Multiplanar, multisequence MR imaging of the right hip was  obtained using standard sequences in 3 orthogonal planes without the  use of intravenous or intra-articular gadolinium contrast.    FINDINGS:    Small to moderate amount of fluid in the right hip joint with  synovitis.    Prominent red marrow within the  visualized pelvis without marrow  signal abnormalities to suggest fracture, osteonecrosis, or marrow  infiltration.    Marked osteoarthrosis in the right hip joint with marked complete  joint space narrowing, high-grade to full-thickness cartilage loss,  degenerative labral tearing, as well as osteophytic spurring at the  femoral head and neck junction.    No full-thickness tendon tear or tendon retraction. Mild fatty  infiltration within the gluteus minimus muscle and vastus lateralis  muscle. Subcutaneous soft tissue edema. Fluid in the region of the  trochanteric bursae. The visualized sciatic nerve is unremarkable. The  visualized femoral vasculature is unremarkable. No lymphadenopathy.      Impression    IMPRESSION:  1. Marked osteoarthrosis in the right hip joint with complete joint  space narrowing, osteophytic spurring, high-grade to full-thickness  cartilage loss and subchondral edema and cystic changes.    2. Small right hip joint effusion with synovitis.    3. Prominent red marrow without evidence of fracture, osteonecrosis,  or marrow infiltration.    RAY MCCRACKEN MD         SYSTEM ID:  [id number]     *Note: Due to a large number of results and/or encounters for the requested time period, some results have not been displayed. A complete set of results can be found in Results Review.         Patient's conditions were thoroughly discussed during today's visit with total time spent face-to-face with the patient and documentation being 22 minutes.    Albert Yeo MD, [facility] Orthopedic [facility]        Again, thank you for allowing me to participate in the care of your patient.        Sincerely,        Albert Yeo, MD    Electronically signed

## 2025-04-29 NOTE — PATIENT INSTRUCTIONS
1. Acute right hip pain    2. Primary osteoarthritis of right hip    3. Lumbosacral spondylosis with radiculopathy      - Patient is following up for right hip pain due to arthritis and likely radiation from the lumbar spine  -MRI results of the right hip was reviewed today shows severe arthritis with complete joint space narrowing, bone spurs and full-thickness cartilage loss.  MRI results were explained and all questions were answered  -Patient states the majority of her pain is at night time when she is sleeping.  Patient states she has minimal pain while standing and walking.  -Patient may be experiencing hip pain radiating from the lumbar spine.  -Patient will get an MRI of the lumbar spine for evaluation of lumbar stenosis causing nerve root impingement  - Your MRI has been ordered. You may call [phone] to schedule over the phone.    -Patient is already scheduled to see Dr. Reza in 3 weeks who can review her MRI results and determine next of treatment options  -Call direct clinic number [[phone]] at any time with questions or concerns.    Albert Yeo MD [facility] Orthopedics and Sports Medicine  [facility] [place]

## 2025-05-04 ASSESSMENT — ACTIVITIES OF DAILY LIVING (ADL)
AS_A_RESULT_OF_YOUR_KNEE_INJURY,_HOW_WOULD_YOU_RATE_YOUR_CURRENT_LEVEL_OF_DAILY_ACTIVITY?: SEVERELY ABNORMAL
GO DOWN STAIRS: ACTIVITY IS VERY DIFFICULT
WALK: ACTIVITY IS VERY DIFFICULT
PAIN: THE SYMPTOM AFFECTS MY ACTIVITY SLIGHTLY
SPORTS_TOTAL_ITEM_SCORE: 0
KNEE_ACTIVITY_OF_DAILY_LIVING_SUM: 27
GETTING_INTO_AND_OUT_OF_AN_AVERAGE_CAR: EXTREME DIFFICULTY
SWELLING: I HAVE THE SYMPTOM BUT IT DOES NOT AFFECT MY ACTIVITY
GIVING WAY, BUCKLING OR SHIFTING OF KNEE: THE SYMPTOM AFFECTS MY ACTIVITY MODERATELY
GIVING WAY, BUCKLING OR SHIFTING OF KNEE: THE SYMPTOM AFFECTS MY ACTIVITY MODERATELY
PLEASE_INDICATE_YOR_PRIMARY_REASON_FOR_REFERRAL_TO_THERAPY:: HIP
LIMPING: I DO NOT HAVE THE SYMPTOM
PUTTING_ON_AN_UNDERSHIRT_OR_A_PULLOVER_SWEATER: 3
ADL_TOTAL_ITEM_SCORE: 0
CARRYING_A_HEAVY_OBJECT_OF_10_POUNDS: 10
RAW_SCORE: 27
AT_ITS_WORST?: 4
GETTING INTO AND OUT OF AN AVERAGE CAR: EXTREME DIFFICULTY
WALK: ACTIVITY IS VERY DIFFICULT
ADL_HIGHEST_POTENTIAL_SCORE: 68
ADL_SCORE(%): 0
WEAKNESS: THE SYMPTOM AFFECTS MY ACTIVITY SEVERELY
WASHING_YOUR_HAIR?: 8
LIMPING: I DO NOT HAVE THE SYMPTOM
SQUAT: I AM UNABLE TO DO THE ACTIVITY
PUTTING_ON_A_SHIRT_THAT_BUTTONS_DOWN_THE_FRONT: 3
SIT WITH YOUR KNEE BENT: ACTIVITY IS MINIMALLY DIFFICULT
PLEASE_INDICATE_YOR_PRIMARY_REASON_FOR_REFERRAL_TO_THERAPY:: SHOULDER
PUSHING_WITH_THE_INVOLVED_ARM: 0
HOS_ADL_HIGHEST_POTENTIAL_SCORE: 8
SIT WITH YOUR KNEE BENT: ACTIVITY IS MINIMALLY DIFFICULT
HOW_WOULD_YOU_RATE_THE_CURRENT_FUNCTION_OF_YOUR_KNEE_DURING_YOUR_USUAL_DAILY_ACTIVITIES_ON_A_SCALE_FROM_0_TO_100_WITH_100_BEING_YOUR_LEVEL_OF_KNEE_FUNCTION_PRIOR_TO_YOUR_INJURY_AND_0_BEING_THE_INABILITY_TO_PERFORM_ANY_OF_YOUR_USUAL_DAILY_ACTIVITIES?: 10
PAIN: THE SYMPTOM AFFECTS MY ACTIVITY SLIGHTLY
GO UP STAIRS: ACTIVITY IS VERY DIFFICULT
SQUAT: I AM UNABLE TO DO THE ACTIVITY
GO UP STAIRS: ACTIVITY IS VERY DIFFICULT
WHEN_LYING_ON_THE_INVOLVED_SIDE: 4
PLEASE_INDICATE_YOR_PRIMARY_REASON_FOR_REFERRAL_TO_THERAPY:: KNEE
TOUCHING_THE_BACK_OF_YOUR_NECK: 0
WEAKNESS: THE SYMPTOM AFFECTS MY ACTIVITY SEVERELY
SPORTS_HIGHEST_POTENTIAL_SCORE: 36
PUTTING_ON_YOUR_PANTS: 8
TWISTING/PIVOTING ON INVOLVED LEG: EXTREME DIFFICULTY
REACHING_FOR_SOMETHING_ON_A_HIGH_SHELF: 0
SPORTS_COUNT: 9
STAND: ACTIVITY IS VERY DIFFICULT
KNEEL ON THE FRONT OF YOUR KNEE: I AM UNABLE TO DO THE ACTIVITY
HOS_ADL_ITEM_SCORE_TOTAL: 2
REMOVING_SOMETHING_FROM_YOUR_BACK_POCKET: 4
SWELLING: I HAVE THE SYMPTOM BUT IT DOES NOT AFFECT MY ACTIVITY
AS_A_RESULT_OF_YOUR_KNEE_INJURY,_HOW_WOULD_YOU_RATE_YOUR_CURRENT_LEVEL_OF_DAILY_ACTIVITY?: SEVERELY ABNORMAL
WASHING_YOUR_BACK: 10
HOW_WOULD_YOU_RATE_THE_CURRENT_FUNCTION_OF_YOUR_KNEE_DURING_YOUR_USUAL_DAILY_ACTIVITIES_ON_A_SCALE_FROM_0_TO_100_WITH_100_BEING_YOUR_LEVEL_OF_KNEE_FUNCTION_PRIOR_TO_YOUR_INJURY_AND_0_BEING_THE_INABILITY_TO_PERFORM_ANY_OF_YOUR_USUAL_DAILY_ACTIVITIES?: 10
HOW_WOULD_YOU_RATE_YOUR_CURRENT_LEVEL_OF_FUNCTION?: SEVERELY ABNORMAL
RISE FROM A CHAIR: ACTIVITY IS VERY DIFFICULT
ADL_COUNT: 17
SPORTS_SCORE(%): 0
PLACING_AN_OBJECT_ON_A_HIGH_SHELF: 10
HOW_WOULD_YOU_RATE_THE_OVERALL_FUNCTION_OF_YOUR_KNEE_DURING_YOUR_USUAL_DAILY_ACTIVITIES?: SEVERELY ABNORMAL
STAND: ACTIVITY IS VERY DIFFICULT
KNEE_ACTIVITY_OF_DAILY_LIVING_SCORE: 38.57
GO DOWN STAIRS: ACTIVITY IS VERY DIFFICULT
STIFFNESS: THE SYMPTOM AFFECTS MY ACTIVITY SLIGHTLY
HOW_WOULD_YOU_RATE_THE_OVERALL_FUNCTION_OF_YOUR_KNEE_DURING_YOUR_USUAL_DAILY_ACTIVITIES?: SEVERELY ABNORMAL
RISE FROM A CHAIR: ACTIVITY IS VERY DIFFICULT
KNEEL ON THE FRONT OF YOUR KNEE: I AM UNABLE TO DO THE ACTIVITY
TWISTING/PIVOTING_ON_INVOLVED_LEG: EXTREME DIFFICULTY
STIFFNESS: THE SYMPTOM AFFECTS MY ACTIVITY SLIGHTLY

## 2025-05-08 ENCOUNTER — THERAPY VISIT (OUTPATIENT)
Dept: PHYSICAL THERAPY | Facility: CLINIC | Age: 85
End: 2025-05-08
Attending: ORTHOPAEDIC SURGERY
Payer: MEDICARE

## 2025-05-08 DIAGNOSIS — M17.0 OSTEOARTHRITIS OF PATELLOFEMORAL JOINTS OF BOTH KNEES: ICD-10-CM

## 2025-05-08 NOTE — PROGRESS NOTES
PHYSICAL THERAPY EVALUATION  Type of Visit: Evaluation       Fall Risk Screen:  Have you fallen 2 or more times in the past year?: Yes  Have you fallen and had an injury in the past year?: No  Is patient receiving Physical Therapy Services?: Yes    Subjective  Pt reports that in July of last year was the first time she had pain from arthritis. Gradually both knees and the hips started hurting. Pain is worst with standing, balance, stairs, squatting are really difficult. Denies vague symptoms. Would like to get rid of this pain and return to PLOF pain free. Wants to get as much function back today as possible, may have total joints in the future but needs to get stronger first. Would like to be able to get out of a chair without issue.           Presenting condition or subjective complaint: I have extreme balance issues and extreme weakness.  Date of onset: 05/08/25    Relevant medical history: Anemia; Arthritis; Dizziness; Hearing problems; Osteoarthritis; Rheumatoid arthritis; Significant weakness; Sleep disorder like apnea; Thyroid problems; Unexplained weight loss; Vision problems   Dates & types of surgery: I had a thyroidectomy, my gallbladder removed, cataract surgery and piggyback cataract surgery, a biopsy of thigh muscle.  Complete list and dates is in my medical file at [facility].    Prior diagnostic imaging/testing results: MRI; CT scan; X-ray; EMG     Prior therapy history for the same diagnosis, illness or injury: Yes Most recent was with [facility] in January.    Living Environment  Social support: With family members   Type of home: House   Stairs to enter the home: Yes 6 Is there a railing: Yes     Ramp: No   Stairs inside the home: Yes 14 Is there a railing: Yes     Help at home: Home management tasks (cooking, cleaning); Home and Yard maintenance tasks; Assist for driving and community activities  Equipment owned: Straight Cane; Four-point cane; Walker; Walker with wheels; Power wheelchair or scooter;  Grab bars; Raised toilet seat; Bath bench; Lift chair     Employment: No    Hobbies/Interests: I used to enjoy crafts, sewing, gardening. Now just able to keep records and reports on computers. 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    Patient goals for therapy: Most important is to be able to easily rise from chairs and stand and walk better. i'd like to be stronger and able to do more around the house.    Pain assessment: Location: edgar knee/Rating: not while seated, worst at night and with exercises     Objective   Gait:  Presents in mechanical w/c, mod/max A with sit to stand transfers  Screening: negative    Flexibility: limited assessment    Knee AROM/PROM: R 0-*, L 0-*    Hip ROM: NT    Knee Strength (* = pain) Right Left   FL 3+/5 3+/5   EXT 3+/5 3+/5   Quad Contraction (Good/Fair/Poor) fair fair   Hip Extension NT/5 NT/5   Hip ABD NT/5 NT/5     Palpation Tenderness:  unremarkable    Swelling/Circumferential Measurements: unremarkable      Accessory Motion: unremarkable passive patella motion    Functional Squat: unable    Balance: poor edgar SLS unable    Other tests: none    Assessment & Plan   CLINICAL IMPRESSIONS  Medical Diagnosis: 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  The following significant findings have been identified: Pain, Decreased ROM/flexibility, Decreased joint mobility, Decreased strength, Impaired balance, Decreased proprioception, Impaired gait, Impaired muscle performance, and Decreased activity tolerance. These impairments interfere with their ability to perform self care tasks, work tasks, recreational activities, household chores, driving , household mobility, and community mobility as compared to previous level of function.     Clinical Decision Making  (Complexity):  Clinical Presentation: Stable/Uncomplicated  Clinical Presentation Rationale: based on medical and personal factors listed in PT evaluation  Clinical Decision Making (Complexity): Low complexity    PLAN OF CARE  Treatment Interventions:  Interventions: Gait Training, Manual Therapy, Neuromuscular Re-education, Therapeutic Activity, Therapeutic Exercise, Self-Care/Home Management    Long Term Goals     PT Goal 1  Goal Identifier: Ambulation  Goal Description: Patient will be able to walk unrestricted distances without device pain free with normalized gati pattern  Rationale: to maximize safety and independence with performance of ADLs and functional tasks;to maximize safety and independence within the home;to maximize safety and independence within the community;to maximize safety and independence with transportation;to maximize safety and independence with self cares  Goal Progress: new goal  Target Date: 07/17/25  PT Goal 2  Goal Identifier: Squatting  Goal Description: Patient will be able to perform a full depth squat pain free without deviation pain free  Rationale: to maximize safety and independence within the community;to maximize safety and independence with transportation;to maximize safety and independence within the home;to maximize safety and independence with performance of ADLs and functional tasks;to maximize safety and independence with self cares  Goal Progress: new goal  Target Date: 07/17/25      Frequency of Treatment: 1 x week  Duration of Treatment: 10 weeks    Recommended Referrals to Other Professionals: none  Education Assessment:   Learner/Method: Patient;No Barriers to Learning  Education Comments: no concerns    Risks and benefits of evaluation/treatment have been explained.   Patient/Family/caregiver agrees with Plan of Care.     Evaluation Time:     PT Eval, Low Complexity Minutes (13700): 15     Signing Clinician: Elver Plascencia PT        [facility]  Services                                                                                   OUTPATIENT PHYSICAL THERAPY      PLAN OF TREATMENT FOR OUTPATIENT REHABILITATION   Patient's Last Name, First Name, Tricia Jc YOB: 1940   Provider's Name   [facility]   Medical Record No.  [id number]     Onset Date: 05/08/25  Start of Care Date: 05/08/25     Medical Diagnosis:  OA of iban patellafemoral joints of iban knees      PT Treatment Diagnosis:  Iban Chronic knee pain Plan of Treatment  Frequency/Duration: 1 x week/ 10 weeks    Certification date from 05/08/25 to 07/17/25         See note for plan of treatment details and functional goals     Elver Plascencia, PT                         I CERTIFY THE NEED FOR THESE SERVICES FURNISHED UNDER        THIS PLAN OF TREATMENT AND WHILE UNDER MY CARE     (Physician attestation of this document indicates review and certification of the therapy plan).              Referring Provider:  Andrew Pérez    Initial Assessment  See Epic Evaluation- Start of Care Date: 05/08/25

## 2025-05-10 ENCOUNTER — HOSPITAL ENCOUNTER (OUTPATIENT)
Dept: MRI IMAGING | Facility: CLINIC | Age: 85
Discharge: HOME | End: 2025-05-10
Attending: FAMILY MEDICINE | Admitting: FAMILY MEDICINE
Payer: MEDICARE

## 2025-05-10 DIAGNOSIS — M47.27 LUMBOSACRAL SPONDYLOSIS WITH RADICULOPATHY: ICD-10-CM

## 2025-05-10 PROCEDURE — 72148 MRI LUMBAR SPINE W/O DYE: CPT

## 2025-05-12 ENCOUNTER — TELEPHONE (OUTPATIENT)
Dept: ORTHOPEDICS | Facility: CLINIC | Age: 85
End: 2025-05-12
Payer: MEDICARE

## 2025-05-12 DIAGNOSIS — R63.4 LOSS OF WEIGHT: Primary | ICD-10-CM

## 2025-05-12 NOTE — TELEPHONE ENCOUNTER
Order/Referral Request    Who is requesting: Patient     Orders being requested: per patient I need to gain weight , I was referred to weight management. I called them and they dont help patient to gain weight only to lose weight. So where can I go for help with gaining weight? Need a new referral elsewhere please.     Reason service is needed/diagnosis: to gain weight    When are orders needed by: whenever     Has this been discussed with Provider: No    Does patient have a preference on a Group/Provider/Facility? N/a    Does patient have an appointment scheduled?: No    Where to send orders: Place orders within Epic    Could we send this information to you in [facility] or would you prefer to receive a phone call?:   Patient would prefer a phone call   Okay to leave a detailed message?: Yes at Home number on file [phone] (home)

## 2025-05-13 NOTE — TELEPHONE ENCOUNTER
Attempted to place nutrition referral. Disclaimer comes up showing that it is not covered under Medicare unless it is for Diabetes or chronic kidney disease stages 3-5. She would need to pay out of pocket.     Patient will need to contact her PCP to assist with the referral.     Phone call to patient and recommended she contact her PCP due to her multiple medical conditions. Explained that it might not be covered. She states she has tried before and was told it wasn't covered except for the above. Suggested she contact her PCP for guidance. She verbalized understanding.     NIK Christiansen RN

## 2025-05-16 SDOH — HEALTH STABILITY: PHYSICAL HEALTH: ON AVERAGE, HOW MANY MINUTES DO YOU ENGAGE IN EXERCISE AT THIS LEVEL?: 20 MIN

## 2025-05-16 SDOH — HEALTH STABILITY: PHYSICAL HEALTH: ON AVERAGE, HOW MANY DAYS PER WEEK DO YOU ENGAGE IN MODERATE TO STRENUOUS EXERCISE (LIKE A BRISK WALK)?: 7 DAYS

## 2025-05-19 ENCOUNTER — THERAPY VISIT (OUTPATIENT)
Dept: PHYSICAL THERAPY | Facility: CLINIC | Age: 85
End: 2025-05-19
Payer: MEDICARE

## 2025-05-19 DIAGNOSIS — M17.0 OSTEOARTHRITIS OF PATELLOFEMORAL JOINTS OF BOTH KNEES: Primary | ICD-10-CM

## 2025-05-19 PROBLEM — M54.41 RIGHT-SIDED LOW BACK PAIN WITH RIGHT-SIDED SCIATICA, UNSPECIFIED CHRONICITY: Status: RESOLVED | Noted: 2017-11-16 | Resolved: 2025-05-19

## 2025-05-19 PROCEDURE — 97110 THERAPEUTIC EXERCISES: CPT | Mod: GP | Performed by: PHYSICAL THERAPIST

## 2025-05-20 ENCOUNTER — OFFICE VISIT (OUTPATIENT)
Dept: ORTHOPEDICS | Facility: CLINIC | Age: 85
End: 2025-05-20
Payer: MEDICARE

## 2025-05-20 ENCOUNTER — ANCILLARY PROCEDURE (OUTPATIENT)
Dept: GENERAL RADIOLOGY | Facility: CLINIC | Age: 85
End: 2025-05-20
Attending: FAMILY MEDICINE
Payer: MEDICARE

## 2025-05-20 DIAGNOSIS — G89.29 CHRONIC MUSCULOSKELETAL PAIN: ICD-10-CM

## 2025-05-20 DIAGNOSIS — M25.512 PAIN OF BOTH SHOULDER JOINTS: Primary | ICD-10-CM

## 2025-05-20 DIAGNOSIS — M25.511 PAIN OF BOTH SHOULDER JOINTS: Primary | ICD-10-CM

## 2025-05-20 DIAGNOSIS — M25.512 PAIN OF BOTH SHOULDER JOINTS: ICD-10-CM

## 2025-05-20 DIAGNOSIS — M79.18 CHRONIC MUSCULOSKELETAL PAIN: ICD-10-CM

## 2025-05-20 DIAGNOSIS — M47.27 LUMBOSACRAL SPONDYLOSIS WITH RADICULOPATHY: ICD-10-CM

## 2025-05-20 DIAGNOSIS — M17.0 OSTEOARTHRITIS OF PATELLOFEMORAL JOINTS OF BOTH KNEES: ICD-10-CM

## 2025-05-20 DIAGNOSIS — M25.511 PAIN OF BOTH SHOULDER JOINTS: ICD-10-CM

## 2025-05-20 DIAGNOSIS — M75.42 IMPINGEMENT SYNDROME OF LEFT SHOULDER: ICD-10-CM

## 2025-05-20 PROCEDURE — 73030 X-RAY EXAM OF SHOULDER: CPT | Mod: TC | Performed by: RADIOLOGY

## 2025-05-20 PROCEDURE — 99215 OFFICE O/P EST HI 40 MIN: CPT | Mod: 25 | Performed by: FAMILY MEDICINE

## 2025-05-20 PROCEDURE — 20611 DRAIN/INJ JOINT/BURSA W/US: CPT | Mod: 50 | Performed by: FAMILY MEDICINE

## 2025-05-20 PROCEDURE — 20610 DRAIN/INJ JOINT/BURSA W/O US: CPT | Mod: 51 | Performed by: FAMILY MEDICINE

## 2025-05-20 RX ORDER — METHYLPREDNISOLONE ACETATE 40 MG/ML
40 INJECTION, SUSPENSION INTRA-ARTICULAR; INTRALESIONAL; INTRAMUSCULAR; SOFT TISSUE
Status: COMPLETED | OUTPATIENT
Start: 2025-05-20 | End: 2025-05-20

## 2025-05-20 RX ORDER — DULOXETIN HYDROCHLORIDE 30 MG/1
30 CAPSULE, DELAYED RELEASE ORAL DAILY
Qty: 30 CAPSULE | Refills: 1 | Status: SHIPPED | OUTPATIENT
Start: 2025-05-20

## 2025-05-20 RX ORDER — ROPIVACAINE HYDROCHLORIDE 5 MG/ML
4 INJECTION, SOLUTION EPIDURAL; INFILTRATION; PERINEURAL
Status: COMPLETED | OUTPATIENT
Start: 2025-05-20 | End: 2025-05-20

## 2025-05-20 RX ORDER — LIDOCAINE HYDROCHLORIDE 10 MG/ML
5 INJECTION, SOLUTION EPIDURAL; INFILTRATION; INTRACAUDAL; PERINEURAL
Status: COMPLETED | OUTPATIENT
Start: 2025-05-20 | End: 2025-05-20

## 2025-05-20 RX ORDER — GABAPENTIN 300 MG/1
300 CAPSULE ORAL AT BEDTIME
Qty: 30 CAPSULE | Refills: 1 | Status: SHIPPED | OUTPATIENT
Start: 2025-05-20

## 2025-05-20 RX ADMIN — ROPIVACAINE HYDROCHLORIDE 4 ML: 5 INJECTION, SOLUTION EPIDURAL; INFILTRATION; PERINEURAL at 15:34

## 2025-05-20 RX ADMIN — METHYLPREDNISOLONE ACETATE 40 MG: 40 INJECTION, SUSPENSION INTRA-ARTICULAR; INTRALESIONAL; INTRAMUSCULAR; SOFT TISSUE at 15:42

## 2025-05-20 RX ADMIN — ROPIVACAINE HYDROCHLORIDE 4 ML: 5 INJECTION, SOLUTION EPIDURAL; INFILTRATION; PERINEURAL at 15:42

## 2025-05-20 RX ADMIN — LIDOCAINE HYDROCHLORIDE 5 ML: 10 INJECTION, SOLUTION EPIDURAL; INFILTRATION; INTRACAUDAL; PERINEURAL at 15:42

## 2025-05-20 RX ADMIN — METHYLPREDNISOLONE ACETATE 40 MG: 40 INJECTION, SUSPENSION INTRA-ARTICULAR; INTRALESIONAL; INTRAMUSCULAR; SOFT TISSUE at 15:18

## 2025-05-20 RX ADMIN — ROPIVACAINE HYDROCHLORIDE 4 ML: 5 INJECTION, SOLUTION EPIDURAL; INFILTRATION; PERINEURAL at 15:18

## 2025-05-20 RX ADMIN — METHYLPREDNISOLONE ACETATE 40 MG: 40 INJECTION, SUSPENSION INTRA-ARTICULAR; INTRALESIONAL; INTRAMUSCULAR; SOFT TISSUE at 15:34

## 2025-05-20 NOTE — PROGRESS NOTES
ASSESSMENT & PLAN  Patient Instructions     1. Pain of both shoulder joints    2. Impingement syndrome of left shoulder    3. Lumbosacral spondylosis with radiculopathy    4. Chronic musculoskeletal pain      - Patient has chronic bilateral shoulder pain due to impingement.  Patient is also following up for chronic bilateral knee pain due to osteoarthritis.  Patient is also following up for chronic low back pain due to arthritis.  -X-rays taken in office today of bilateral shoulders shows mild degenerative changes of the AC joints and the acromion.  No significant glenohumeral degenerative changes.  -Patient reports bilateral shoulder pain at nighttime.  Minimal pain during the day with full range of motion and function.  Patient does have weakness of bilateral upper extremity  -Patient tolerated left subacromial cortisone injection today without complications.  Patient was given postprocedure instructions  -Patient is also following up for chronic bilateral knee pain and swelling due to osteoarthritis.  Patient tolerated bilateral knee intra-articular cortisone injections today without complications.  Patient was given postprocedure instructions  -Patient is also reporting ongoing low back pain rating down the right leg.  Patient has an appointment with Dr. Reza next week.  -Patient states that she ran out of her gabapentin medication yesterday and only had enough to take 2 tablets or 200 mg and had significantly worsening pain last night.  -And refill for gabapentin 300 mg at bedtime was ordered today.  -We also discussed starting Cymbalta 30 mg for her chronic multiple joint pains due to arthritis.  We will try to titrate up her Cymbalta to 60 mg daily and titrate down her gabapentin as she is reporting some fatigue and drowsiness in the mornings  -Patient does state that she is tired in the mornings but she does have better sleep and less pain at nighttime so we will continue this dose of gabapentin at this  "time  -Call direct clinic number [[phone]] at any time with questions or concerns.    Albert Yeo MD [facility]  [facility]        -----    SUBJECTIVE  Tricia Sosa is a/an 84 year old Right handed female who is seen as a self referral for evaluation of bilateral shoulder pain, pain is equal in bilateral shoulder. The patient is seen by themselves. Her biggest concerns is that her sleep is being disturbed. She has an upcoming appointment with Dr. Reza     Onset: \"months\"  Reports insidious onset without acute precipitating event.  Location of Pain: bilateral shoulder, starts around the GH joint and radiates to her elbows  Rating of Pain at worst: 4/10  Rating of Pain Currently: 0/10  Worsened by: night time while laying in bed, overhead activities  Better with: Diclofenac gel  Treatments tried: Diclofenac gel   Associated symptoms: no distal numbness or tingling; denies swelling or warmth  Orthopedic history: Yes, right hip, bilateral knee and bilateral hand pain  Relevant surgical history: NO  Social history: Not employed    Past Medical History:   Diagnosis Date    Arthritis     WARD (dyspnea on exertion)     External hemorrhoids without mention of complication 06/27/2005    Hemolytic anemia     autoimmune    Hypothyroidism     IgA deficiency (H)     Myopia of both eyes with astigmatism and presbyopia 08/16/2017    Other malaise and fatigue     Other severe protein-calorie malnutrition     Other vitreous opacities 12/19/2006    Sleep apnea     Thyroid disease     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    Hemorrhage Subdural Trauma Without LOC Active    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 No     Social Drivers of Health     Financial Resource Strain: Low Risk  (1/5/2024)    Financial Resource Strain     Within the past 12 months, have you or your family members you live with been unable to get utilities (heat, electricity) when it was really needed?: No   Food Insecurity: Low Risk  (1/5/2024)    Food Insecurity     Within the past 12 months, did you worry that your food would run out before you got money to buy more?: No     Within the past 12 months, did the food you bought just not last and you didn t have money to get more?: No   Transportation Needs: Low Risk  (1/5/2024)    Transportation Needs     Within the past 12 months, has lack of transportation kept you from medical appointments, getting your medicines, non-medical meetings or appointments, work, or from getting things that you need?: No   Physical Activity: Insufficiently Active (5/16/2025)    Exercise Vital Sign     Days of Exercise per Week: 7 days     Minutes of Exercise per Session: 20 min   Interpersonal Safety: Low Risk  (5/8/2025)    Interpersonal Safety     Do you feel physically and emotionally safe where you currently live?: Yes     Within the past 12 months, have you been hit, slapped, kicked or otherwise physically hurt by someone?: No     Within the past 12 months, have you been humiliated or emotionally abused in other ways by your partner or ex-partner?: No   Housing Stability: Low Risk  (1/5/2024)    Housing Stability     Do you have housing? : Yes     Are you worried about losing your housing?: No         Patient's past medical, surgical, social, and family histories were reviewed today and  no changes are noted.    REVIEW OF SYSTEMS:  10 point ROS is negative other than symptoms noted above in HPI, Past Medical History or as stated below  Constitutional: NEGATIVE for fever, chills, change in weight  Skin: NEGATIVE for worrisome rashes, moles or lesions  GI/: NEGATIVE for bowel or bladder changes  Neuro: NEGATIVE for weakness, dizziness or paresthesias    OBJECTIVE:  LMP  (LMP Unknown)    General: healthy, alert and in no distress  HEENT: no scleral icterus or conjunctival erythema  Skin: no suspicious lesions or rash. No jaundice.  CV: regular rhythm by palpation  Resp: normal respiratory effort without conversational dyspnea   Psych: normal mood and affect  Gait: normal steady gait with appropriate coordination and balance  Neuro: normal light touch sensory exam of the bilateral upper extremities.    MSK:  BILATERAL SHOULDER  Inspection:    no swelling, bruising, discoloration, or obvious deformity or asymmetry  Palpation:  bony and tendinous landmarks are nontender.  Active Range of Motion:     Abduction 1650, FF 1800, , IR L4.    Strength:    Scapular plane abduction weakness,  ER weakness, IR weakness, biceps not tested, triceps not tested  Special Tests:    Positive: Neer's, Mccormick', and supraspinatus (empty can)    Negative: drop arm/painful arc, crossed arm adduction, [place]'s, Speed's, and Yergason's    Independent visualization of the below image:  No results found for this or any previous visit (from the past 24 hours).    Personal review of bilateral shoulder x-rays taken in office today show degenerative changes of bilateral acromioclavicular joints with small osteophytes and undersurface of the acromion.  No significant glenohumeral degenerative changes.      Large Joint Injection: L subacromial bursa    Date/Time: 5/20/2025 3:18 PM    Performed by: Yeo, Albert, MD  Authorized by: Yeo, Albert, MD    Indications:  Pain  Needle Size:  25 G  Guidance: landmark guided    Approach:   Posterior  Location:  Shoulder      Site:  L subacromial bursa  Medications:  40 mg methylPREDNISolone 40 MG/ML; 4 mL ROPivacaine 5 MG/ML  Outcome:  Tolerated well, no immediate complications  Procedure discussed: discussed risks, benefits, and alternatives    Consent Given by:  Patient  Timeout: timeout called immediately prior to procedure    Prep: patient was prepped and draped in usual sterile fashion    Large Joint Injection/Arthocentesis: bilateral knee    Date/Time: 5/20/2025 3:34 PM    Performed by: Yeo, Albert, MD  Authorized by: Yeo, Albert, MD    Indications:  Pain and osteoarthritis  Needle Size:  22 G  Guidance: ultrasound    Approach:  Superolateral  Location:  Knee  Laterality:  Bilateral      Medications (Right):  40 mg methylPREDNISolone 40 MG/ML; 4 mL ROPivacaine 5 MG/ML  Medications (Left):  40 mg methylPREDNISolone 40 MG/ML; 4 mL ROPivacaine 5 MG/ML  Outcome:  Tolerated well, no immediate complications  Procedure discussed: discussed risks, benefits, and alternatives    Consent Given by:  Patient  Timeout: timeout called immediately prior to procedure    Prep: patient was prepped and draped in usual sterile fashion     Ultrasound was used to ensure safe and accurate needle placement and injection. Ultrasound images of the procedure were permanently stored.    Large Joint Injection: bilateral knee    Date/Time: 5/20/2025 3:42 PM    Performed by: Yeo, Albert, MD  Authorized by: Yeo, Albert, MD    Indications:  Pain  Needle Size:  25 G  Guidance: ultrasound    Approach:  Superolateral  Location:  Knee  Laterality:  Bilateral      Medications (Right):  40 mg methylPREDNISolone 40 MG/ML; 5 mL lidocaine (PF) 1 %; 4 mL ROPivacaine 5 MG/ML  Aspirate amount (mL):  13  Aspirate:  Yellow  Medications (Left):  40 mg methylPREDNISolone 40 MG/ML; 5 mL lidocaine (PF) 1 %; 4 mL ROPivacaine 5 MG/ML  Aspirate amount (mL):  30  Aspirate:  Yellow and blood-tinged  Outcome:  Tolerated well, no immediate  complications  Procedure discussed: discussed risks, benefits, and alternatives    Consent Given by:  Patient  Timeout: timeout called immediately prior to procedure    Prep: patient was prepped and draped in usual sterile fashion     Ultrasound was used to ensure safe and accurate needle placement and injection. Ultrasound images of the procedure were permanently stored.      Patient's conditions were thoroughly discussed during today's visit with total time spent face-to-face with the patient and documentation being 45 minutes, excluding time to perform the procedures.    Albert Yeo MD [facility]opedic [facility] [place]

## 2025-05-20 NOTE — LETTER
5/20/2025      Tricia Sosa  [id number]  [place] [id number]      Dear Colleague,    Thank you for referring your patient, Tricia Sosa, to the [facility] [place]. Please see a copy of my visit note below.    ASSESSMENT & PLAN  Patient Instructions     1. Pain of both shoulder joints    2. Impingement syndrome of left shoulder    3. Lumbosacral spondylosis with radiculopathy    4. Chronic musculoskeletal pain      - Patient has chronic bilateral shoulder pain due to impingement.  Patient is also following up for chronic bilateral knee pain due to osteoarthritis.  Patient is also following up for chronic low back pain due to arthritis.  -X-rays taken in office today of bilateral shoulders shows mild degenerative changes of the AC joints and the acromion.  No significant glenohumeral degenerative changes.  -Patient reports bilateral shoulder pain at nighttime.  Minimal pain during the day with full range of motion and function.  Patient does have weakness of bilateral upper extremity  -Patient tolerated left subacromial cortisone injection today without complications.  Patient was given postprocedure instructions  -Patient is also following up for chronic bilateral knee pain and swelling due to osteoarthritis.  Patient tolerated bilateral knee intra-articular cortisone injections today without complications.  Patient was given postprocedure instructions  -Patient is also reporting ongoing low back pain rating down the right leg.  Patient has an appointment with Dr. Reza next week.  -Patient states that she ran out of her gabapentin medication yesterday and only had enough to take 2 tablets or 200 mg and had significantly worsening pain last night.  -And refill for gabapentin 300 mg at bedtime was ordered today.  -We also discussed starting Cymbalta 30 mg for her chronic multiple joint pains due to arthritis.  We will try to titrate up her Cymbalta to 60 mg daily and  "titrate down her gabapentin as she is reporting some fatigue and drowsiness in the mornings  -Patient does state that she is tired in the mornings but she does have better sleep and less pain at nighttime so we will continue this dose of gabapentin at this time  -Call direct clinic number [[phone]] at any time with questions or concerns.    Albert Yeo MD [facility]  [facility]        -----    SUBJECTIVE  Tricia Sosa is a/an 84 year old Right handed female who is seen as a self referral for evaluation of bilateral shoulder pain, pain is equal in bilateral shoulder. The patient is seen by themselves. Her biggest concerns is that her sleep is being disturbed. She has an upcoming appointment with Dr. Reza     Onset: \"months\"  Reports insidious onset without acute precipitating event.  Location of Pain: bilateral shoulder, starts around the GH joint and radiates to her elbows  Rating of Pain at worst: 4/10  Rating of Pain Currently: 0/10  Worsened by: night time while laying in bed, overhead activities  Better with: Diclofenac gel  Treatments tried: Diclofenac gel   Associated symptoms: no distal numbness or tingling; denies swelling or warmth  Orthopedic history: Yes, right hip, bilateral knee and bilateral hand pain  Relevant surgical history: NO  Social history: Not employed    Past Medical History:   Diagnosis Date     Arthritis      WARD (dyspnea on exertion)      External hemorrhoids without mention of complication 06/27/2005     Hemolytic anemia     autoimmune     Hypothyroidism      IgA deficiency (H)      Myopia of both eyes with astigmatism and presbyopia 08/16/2017     Other malaise and fatigue      Other severe protein-calorie malnutrition      Other vitreous opacities 12/19/2006     Sleep apnea      Thyroid disease      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    Hemorrhage Subdural Trauma Without LOC Active     Unspecified " congenital anomaly of eye     vitreous condensation left eye, and posterior vitreous detachment     Urinary tract infection, site not specified     Recurrent UTI's     Social History     Socioeconomic History     Marital status:    Tobacco Use     Smoking status: Never     Passive exposure: Never     Smokeless tobacco: Never   Vaping Use     Vaping status: Never Used   Substance and Sexual Activity     Alcohol use: Not Currently     Comment: 1 a day at most     Drug use: No     Sexual activity: Not Currently     Partners: Male   Other Topics Concern     Parent/sibling w/ CABG, MI or angioplasty before 65F 55M? No     Social Drivers of Health     Financial Resource Strain: Low Risk  (1/5/2024)    Financial Resource Strain      Within the past 12 months, have you or your family members you live with been unable to get utilities (heat, electricity) when it was really needed?: No   Food Insecurity: Low Risk  (1/5/2024)    Food Insecurity      Within the past 12 months, did you worry that your food would run out before you got money to buy more?: No      Within the past 12 months, did the food you bought just not last and you didn t have money to get more?: No   Transportation Needs: Low Risk  (1/5/2024)    Transportation Needs      Within the past 12 months, has lack of transportation kept you from medical appointments, getting your medicines, non-medical meetings or appointments, work, or from getting things that you need?: No   Physical Activity: Insufficiently Active (5/16/2025)    Exercise Vital Sign      Days of Exercise per Week: 7 days      Minutes of Exercise per Session: 20 min   Interpersonal Safety: Low Risk  (5/8/2025)    Interpersonal Safety      Do you feel physically and emotionally safe where you currently live?: Yes      Within the past 12 months, have you been hit, slapped, kicked or otherwise physically hurt by someone?: No      Within the past 12 months, have you been humiliated or emotionally  abused in other ways by your partner or ex-partner?: No   Housing Stability: Low Risk  (1/5/2024)    Housing Stability      Do you have housing? : Yes      Are you worried about losing your housing?: No         Patient's past medical, surgical, social, and family histories were reviewed today and no changes are noted.    REVIEW OF SYSTEMS:  10 point ROS is negative other than symptoms noted above in HPI, Past Medical History or as stated below  Constitutional: NEGATIVE for fever, chills, change in weight  Skin: NEGATIVE for worrisome rashes, moles or lesions  GI/: NEGATIVE for bowel or bladder changes  Neuro: NEGATIVE for weakness, dizziness or paresthesias    OBJECTIVE:  LMP  (LMP Unknown)    General: healthy, alert and in no distress  HEENT: no scleral icterus or conjunctival erythema  Skin: no suspicious lesions or rash. No jaundice.  CV: regular rhythm by palpation  Resp: normal respiratory effort without conversational dyspnea   Psych: normal mood and affect  Gait: normal steady gait with appropriate coordination and balance  Neuro: normal light touch sensory exam of the bilateral upper extremities.    MSK:  BILATERAL SHOULDER  Inspection:    no swelling, bruising, discoloration, or obvious deformity or asymmetry  Palpation:  bony and tendinous landmarks are nontender.  Active Range of Motion:     Abduction 1650, FF 1800, , IR L4.    Strength:    Scapular plane abduction weakness,  ER weakness, IR weakness, biceps not tested, triceps not tested  Special Tests:    Positive: Neer's, Mccormick', and supraspinatus (empty can)    Negative: drop arm/painful arc, crossed arm adduction, [place]'s, Speed's, and Yergason's    Independent visualization of the below image:  No results found for this or any previous visit (from the past 24 hours).    Personal review of bilateral shoulder x-rays taken in office today show degenerative changes of bilateral acromioclavicular joints with small osteophytes and undersurface  of the acromion.  No significant glenohumeral degenerative changes.      Large Joint Injection: L subacromial bursa    Date/Time: 5/20/2025 3:18 PM    Performed by: Yeo, Albert, MD  Authorized by: Yeo, Albert, MD    Indications:  Pain  Needle Size:  25 G  Guidance: landmark guided    Approach:  Posterior  Location:  Shoulder      Site:  L subacromial bursa  Medications:  40 mg methylPREDNISolone 40 MG/ML; 4 mL ROPivacaine 5 MG/ML  Outcome:  Tolerated well, no immediate complications  Procedure discussed: discussed risks, benefits, and alternatives    Consent Given by:  Patient  Timeout: timeout called immediately prior to procedure    Prep: patient was prepped and draped in usual sterile fashion    Large Joint Injection/Arthocentesis: bilateral knee    Date/Time: 5/20/2025 3:34 PM    Performed by: Yeo, Albert, MD  Authorized by: Yeo, Albert, MD    Indications:  Pain and osteoarthritis  Needle Size:  22 G  Guidance: ultrasound    Approach:  Superolateral  Location:  Knee  Laterality:  Bilateral      Medications (Right):  40 mg methylPREDNISolone 40 MG/ML; 4 mL ROPivacaine 5 MG/ML  Medications (Left):  40 mg methylPREDNISolone 40 MG/ML; 4 mL ROPivacaine 5 MG/ML  Outcome:  Tolerated well, no immediate complications  Procedure discussed: discussed risks, benefits, and alternatives    Consent Given by:  Patient  Timeout: timeout called immediately prior to procedure    Prep: patient was prepped and draped in usual sterile fashion     Ultrasound was used to ensure safe and accurate needle placement and injection. Ultrasound images of the procedure were permanently stored.    Large Joint Injection: bilateral knee    Date/Time: 5/20/2025 3:42 PM    Performed by: Yeo, Albert, MD  Authorized by: Yeo, Albert, MD    Indications:  Pain  Needle Size:  25 G  Guidance: ultrasound    Approach:  Superolateral  Location:  Knee  Laterality:  Bilateral      Medications (Right):  40 mg methylPREDNISolone 40 MG/ML; 5 mL lidocaine (PF) 1  %; 4 mL ROPivacaine 5 MG/ML  Aspirate amount (mL):  13  Aspirate:  Yellow  Medications (Left):  40 mg methylPREDNISolone 40 MG/ML; 5 mL lidocaine (PF) 1 %; 4 mL ROPivacaine 5 MG/ML  Aspirate amount (mL):  30  Aspirate:  Yellow and blood-tinged  Outcome:  Tolerated well, no immediate complications  Procedure discussed: discussed risks, benefits, and alternatives    Consent Given by:  Patient  Timeout: timeout called immediately prior to procedure    Prep: patient was prepped and draped in usual sterile fashion     Ultrasound was used to ensure safe and accurate needle placement and injection. Ultrasound images of the procedure were permanently stored.      Patient's conditions were thoroughly discussed during today's visit with total time spent face-to-face with the patient and documentation being 45 minutes, excluding time to perform the procedures.    Albert Yeo MD [facility]      Again, thank you for allowing me to participate in the care of your patient.        Sincerely,        Albert Yeo, MD    Electronically signed

## 2025-05-20 NOTE — PATIENT INSTRUCTIONS
1. Pain of both shoulder joints    2. Impingement syndrome of left shoulder    3. Lumbosacral spondylosis with radiculopathy    4. Chronic musculoskeletal pain      - Patient has chronic bilateral shoulder pain due to impingement.  Patient is also following up for chronic bilateral knee pain due to osteoarthritis.  Patient is also following up for chronic low back pain due to arthritis.  -X-rays taken in office today of bilateral shoulders shows mild degenerative changes of the AC joints and the acromion.  No significant glenohumeral degenerative changes.  -Patient reports bilateral shoulder pain at nighttime.  Minimal pain during the day with full range of motion and function.  Patient does have weakness of bilateral upper extremity  -Patient tolerated left subacromial cortisone injection today without complications.  Patient was given postprocedure instructions  -Patient is also following up for chronic bilateral knee pain and swelling due to osteoarthritis.  Patient tolerated bilateral knee intra-articular cortisone injections today without complications.  Patient was given postprocedure instructions  -Patient is also reporting ongoing low back pain rating down the right leg.  Patient has an appointment with Dr. Reza next week.  -Patient states that she ran out of her gabapentin medication yesterday and only had enough to take 2 tablets or 200 mg and had significantly worsening pain last night.  -And refill for gabapentin 300 mg at bedtime was ordered today.  -We also discussed starting Cymbalta 30 mg for her chronic multiple joint pains due to arthritis.  We will try to titrate up her Cymbalta to 60 mg daily and titrate down her gabapentin as she is reporting some fatigue and drowsiness in the mornings  -Patient does state that she is tired in the mornings but she does have better sleep and less pain at nighttime so we will continue this dose of gabapentin at this time  -Call direct clinic number [[phone]]  at any time with questions or concerns.    Albert Yeo MD CAQSM  [facility]  [facility] [place]

## 2025-05-21 ENCOUNTER — PATIENT OUTREACH (OUTPATIENT)
Dept: CARE COORDINATION | Facility: CLINIC | Age: 85
End: 2025-05-21
Payer: MEDICARE

## 2025-05-21 NOTE — Clinical Note
Pt followed by hematology for anemia, SW offered home help resources to pt though she denies interest in them at this time.

## 2025-05-21 NOTE — PROGRESS NOTES
Social Work - Distress Screen Intervention  [facility]    Identified Concern and Score from Distress Screenin. How concerned are you about your ability to eat? 0     2. How concerned are you about unintended weight loss or your current weight? (!) 10     3. How concerned are you about feeling depressed or very sad?  5     4. How concerned are you about feeling anxious or very scared?  0     5. Do you struggle with the loss of meaning and jh in your life?  Not at all     6. How concerned are you about work and home life issues that may be affected by your cancer?  (!) 8     7. How concerned are you about knowing what resources are available to help you?  0     8. Do you currently have what you would describe as Mandaen or spiritual struggles? Not at all     9. If you want to be contacted by one of our professionals, I can send a message to them right now.  No data recorded     Date of Distress Screen: 25  Data: At time of last visit, patient scored positive on distress screening.  outreached to patient today to follow up on elevated distress and introduce psychosocial services and support.  Intervention/Education provided:  contacted patient by phone to discuss distress screening results. Provided  contact information and requested return call    Follow-up Required:  to remain available for support and await return call from patient    Zachary Breen, Casual , for Katherin Cotton. Sociall Worker  [facility]  [phone]

## 2025-05-21 NOTE — PROGRESS NOTES
Social Work - Distress Screen Intervention  [facility]    Identified Concern and Score from Distress Screenin. How concerned are you about your ability to eat? 0     2. How concerned are you about unintended weight loss or your current weight? (!) 10     3. How concerned are you about feeling depressed or very sad?  5     4. How concerned are you about feeling anxious or very scared?  0     5. Do you struggle with the loss of meaning and jh in your life?  Not at all     6. How concerned are you about work and home life issues that may be affected by your cancer?  (!) 8     7. How concerned are you about knowing what resources are available to help you?  0     8. Do you currently have what you would describe as Church or spiritual struggles? Not at all     9. If you want to be contacted by one of our professionals, I can send a message to them right now.  No data recorded     Date of Distress Screen: 25  Data: At time of last visit, patient scored positive on distress screening.  outreached to patient today to follow up on elevated distress and introduce psychosocial services and support.  Intervention/Education provided:  contacted patient by phone to discuss distress screening results.  Pt sees hematology for anemia diagnosis.  Pt also has rheumatoid arthritis which is affecting her ability to manage chores around the house and some self-cares.  Pt does have a son and daughter-in-law that are able to assist her some.  Pt uses a cane or walker, takes [employer] to some of her medical appointments, has a power chair, and has chair lifts in her home.  Pt currently goes to outpatient physical therapy.  Pt indicates that she has a long term care insurance policy that will cover assistance at home with ADL's though pt states she is not ready to hire people for that.  SW offered homemaker services to assist with household chores through a home care agency that  would be private pay.  Pt has the ability to pay for this assistance though is not interested in hiring them at this time.  Pt reported a negative experience with these services in the past with her .  There is no need for further SW assistance as this time since pt is not ready to hire home assistance at this time.      Follow-up Required:  will remain available to patient for support as needed    Zachary Breen, Casual , for Katherin Cotton   [facility]  [phone]

## 2025-05-22 ENCOUNTER — TELEPHONE (OUTPATIENT)
Dept: ONCOLOGY | Facility: CLINIC | Age: 85
End: 2025-05-22
Payer: MEDICARE

## 2025-05-22 NOTE — PROGRESS NOTES
CLINICAL NUTRITION SERVICES- ONCOLOGY DISTRESS SCREENING     Identified Concern and Score From Distress Screenin. How concerned are you about your ability to eat? :  0  2. How concerned are you about unintended weight loss or your current weight? : 10  Patient with h/o autoimmune hemolytic anemia.     Date of Distress Screenin/16     Findings: Phone call with Tricia. Reports that the past couple weeks she has been working on increasing her protein and calorie intake. She doesn't have much of an appetite. She has been adding whey protein powder to foods and also drinking Ensure Complete (350 kcal and 30 gm protein). For dinners she usually has a frozen meal or something her family brings, recently trying Let's [facility].      Intervention: Discussed ways to increase calories and protein.   Provided handout (via email and mail): High-Calorie, High Protein Nutrition Therapy     Follow-up Required: As needed. Provided RD phone number. Tricia does not have insurance coverage for scheduled nutrition visits at this time.     Kristen Watson RD, LD  [phone]

## 2025-05-23 ENCOUNTER — OFFICE VISIT (OUTPATIENT)
Dept: ORTHOPEDICS | Facility: CLINIC | Age: 85
End: 2025-05-23
Payer: MEDICARE

## 2025-05-23 VITALS — SYSTOLIC BLOOD PRESSURE: 136 MMHG | HEART RATE: 67 BPM | DIASTOLIC BLOOD PRESSURE: 69 MMHG | OXYGEN SATURATION: 98 %

## 2025-05-23 DIAGNOSIS — M41.9 SCOLIOSIS OF LUMBAR SPINE, UNSPECIFIED SCOLIOSIS TYPE: ICD-10-CM

## 2025-05-23 DIAGNOSIS — M16.11 PRIMARY OSTEOARTHRITIS OF RIGHT HIP: Primary | ICD-10-CM

## 2025-05-23 DIAGNOSIS — M47.817 FACET ARTHROPATHY, LUMBOSACRAL: ICD-10-CM

## 2025-05-23 DIAGNOSIS — M51.370 DEGENERATION OF INTERVERTEBRAL DISC OF LUMBOSACRAL REGION WITH DISCOGENIC BACK PAIN: ICD-10-CM

## 2025-05-23 PROCEDURE — 3078F DIAST BP <80 MM HG: CPT | Performed by: PHYSICAL MEDICINE & REHABILITATION

## 2025-05-23 PROCEDURE — 99215 OFFICE O/P EST HI 40 MIN: CPT | Performed by: PHYSICAL MEDICINE & REHABILITATION

## 2025-05-23 PROCEDURE — 3075F SYST BP GE 130 - 139MM HG: CPT | Performed by: PHYSICAL MEDICINE & REHABILITATION

## 2025-05-23 PROCEDURE — 1126F AMNT PAIN NOTED NONE PRSNT: CPT | Performed by: PHYSICAL MEDICINE & REHABILITATION

## 2025-05-23 ASSESSMENT — PAIN SCALES - GENERAL: PAINLEVEL_OUTOF10: NO PAIN (0)

## 2025-05-23 NOTE — PATIENT INSTRUCTIONS
Please schedule a follow-up appointment in 2-3 months.  You can schedule this at the , or you can call [phone].    Clinic Fax:  [phone].    For nursing questions, please call [phone].    For medical records, please call [phone].    Please schedule ultrasound-guided right hip injection with me.  The [facility] Procedure Scheduling Team will call you to schedule your procedure in the next 0-3 days.  You can also call them directly at [phone] option 2.

## 2025-05-23 NOTE — PROGRESS NOTES
"PHYSICAL MEDICINE & REHABILITATION / MEDICAL SPINE        Date:  May 23, 2025    Name:  Tricia Sosa  YOB: 1940  MRN:  [id number]      \"I am using a new tool to help me save time with documentation.  Basically it is going to listen to our visit today and uses AI to help me draft my note.  Is it okay if I use this tool today?\"        CHART REVIEW:  Reviewed 02/25/2025 note from Amy Sosa MD (rheumatology).  Ms. Tricia Sosa had seronegative rheumatoid arthritis.     Reviewed 02/07/2025 note from Marley Heart MD (physical medicine and rehabilitation).  Ms. Tricia Sosawas seen in the emergency department on 01/30/2025.  She had neck and head pain and neck stiffness since August 2024.  She also had pain in her hands and knees around the same time.  Ms. Tricia Sosa was to follow-up with her primary care and rheumatology.         CHIEF COMPLAINT:  Right greater than left groin pain        HISTORY OF PRESENT ILLNESS:  Ms. Tricia Sosa is an 84-year-old, right-hand-dominant, female.  She has common variable immunodeficiency, autoimmune hemolytic anemia, seronegative rheumatoid arthritis, and polyneuropathy.  Ms. Guaman denied any other personal history of autoimmune diseases, rheumatologic diseases, gout, pseudogout, neurologic diseases, inflammatory bowel diseases.     Ms. Tricia Sosa stated that in 1961 she fell off a horse and injured her right thumb.  Ms. Sosa denied any other injuries of her head, neck, upper back, mid back, chest, shoulders, arms, elbows, forearms, wrist, hands, fingers.     Ms. Tricia Sosa undergoes regular IgG infusions and rituximab treatments.    History of Present Illness-  - Tricia Sosa, 84-year-old female.  - Received shots in both knees and shoulder on Tuesday, resulting in significant pain relief for three nights and improved ability to rise from chairs.  - Experiencing pain in the hip, neck, and both shoulders, with pain radiating into the arms " at night, limiting arm movement.  - Hip pain located in the groin area, ongoing for several months, disturbing sleep and causing extreme tiredness.  - On gabapentin for hip pain; 100 mg and 200 mg doses were ineffective, but 300 mg provided some relief.  - Uses a wheelchair for transportation due to difficulty rising from seats; does not use it at home.  - Reports pain in knees during exercises and certain movements, with popping sounds in knees.  - Hip pain has been present since at least August 2024, with a history of intermittent sharp pain during leg movement in and out of a car since around 2014.  - Describes current hip pain as a disturbing ache at night, with occasional feelings of the leg giving out.  - Reports weakness in legs, poor balance, and difficulty rising from chairs, requiring extra cushions.  - Experiences numbness in the bottom of both feet, but no tingling or pins and needles.  - Has had at least two falls per year in recent years, with the last fall occurring in June.  - Stopped walking outside since October due to weakness and balance issues.  - History of extensive back pain many years ago, treated with Zomax.  - Tried Tylenol for hip pain without relief; gabapentin at 300 mg is the only prescription medication that has helped.  - Reports no significant back pain currently, but hip pain persists at night.        ALLERGIES:  Allergies   Allergen Reactions    Doxycycline          MEDICATIONS:  Current Outpatient Medications   Medication Sig Dispense Refill    cyanocobalamin (VITAMIN  B-12) 1000 MCG tablet   3    diclofenac (VOLTAREN) 1 % topical gel Apply 4 g topically 4 times daily as needed for moderate pain. 150 g 3    DULoxetine (CYMBALTA) 30 MG capsule Take 1 capsule (30 mg) by mouth daily. 30 capsule 1    folic acid (FOLVITE) 1 MG tablet   3    gabapentin (NEURONTIN) 300 MG capsule Take 1 capsule (300 mg) by mouth at bedtime. 30 capsule 1    hydroCHLOROthiazide 12.5 MG tablet TAKE 1  TABLET AS NEEDED FOR EDEMA 30 tablet 2    ketoconazole (NIZORAL) 2 % external shampoo Use every other day to scalp as needed 120 mL 0    levothyroxine (SYNTHROID/LEVOTHROID) 150 MCG tablet Take 1 tablet (150 mcg) by mouth daily 90 tablet 3    methotrexate 2.5 MG tablet Take 6 tablets (15 mg) by mouth every 7 days. 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    Hemorrhage Subdural Trauma Without LOC Active    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, and posterior vitreous detachment    Urinary tract infection, site not specified     Recurrent UTI's         PAST SURGICAL HISTORY:  Past Surgical History:   Procedure Laterality Date    BIOPSY MUSCLE DIAGNOSTIC (LOCATION) Right 1/16/2024    Procedure: BIOPSY, MUSCLE - RIGHT THIGH;  Surgeon: Dickson Madrid MD;  Location:  OR    COLONOSCOPY N/A 4/26/2016    Procedure: COLONOSCOPY;  Surgeon: Dontae Del Rio MD;  Location:  GI    COLONOSCOPY N/A 2/22/2024    Procedure: Colonoscopy with polypectomy by using cold snare and two hemoclips applied;  Surgeon: Dontae Del Rio MD;  Location:  GI    ENT SURGERY  1985    Thyroid lobectomy    ESOPHAGOSCOPY, GASTROSCOPY, DUODENOSCOPY (EGD), COMBINED N/A 2/22/2024    Procedure: ESOPHAGOGASTRODUODENOSCOPY, WITH BIOPSY by using cold forcep;  Surgeon: Dontae Del Rio MD;  Location:  GI    EYE SURGERY Bilateral  2021    Cataract Surgery     CYSTOSCOPY,INSERT URETERAL STENT      Ureteral stent insertion     FLEX SIGMOIDOSCOPY W BIOPSY  00     LAPAROSCOPY, SURGICAL; CHOLECYSTECTOMY       THYROIDECTOMY      LIGATION OF HEMORRHOID(S)      Hemorrhoidectomy    UPPER GI ENDOSCOPY,BIOPSY  10/01/01    [facility] LIGATE FALLOPIAN TUBE      [facility]  [facility]    [facility] COLONOSCOPY W BIOPSY  05    REPEAT IN 5 YEARS         FAMILY HISTORY:  Family History   Problem Relation Age of Onset    Colon Cancer Mother 90    Cerebrovascular Disease Father          at age 85    Breast Cancer Sister     Hyperlipidemia Sister     Depression Sister     Anxiety Disorder Sister     Thyroid Disease Sister     Lung Cancer Sister     Breast Cancer Sister     Other Cancer Sister         Lung    Dementia Brother     Prostate Cancer Brother     Thyroid Disease Brother     Dementia Brother     Thyroid Disease Brother     Pancreatic Cancer Brother     Colon Cancer Niece     Other Cancer Niece         leukemia         SOCIAL HISTORY:  Social History     Socioeconomic History    Marital status:      Spouse name: Not on file    Number of children: Not on file    Years of education: Not on file    Highest education level: Not on file   Occupational History    Not on file   Tobacco Use    Smoking status: Never     Passive exposure: Never    Smokeless tobacco: Never   Vaping Use    Vaping status: Never Used   Substance and Sexual Activity    Alcohol use: Not Currently     Comment: 1 a day at most    Drug use: No    Sexual activity: Not Currently     Partners: Male   Other Topics Concern    Parent/sibling w/ CABG, MI or angioplasty before 65F 55M? No   Social History Narrative    Not on file     Social Drivers of Health     Financial Resource Strain: Low Risk  (2024)    Financial Resource Strain     Within the past 12 months, have you or your family members you live with been  unable to get utilities (heat, electricity) when it was really needed?: No   Food Insecurity: Low Risk  (1/5/2024)    Food Insecurity     Within the past 12 months, did you worry that your food would run out before you got money to buy more?: No     Within the past 12 months, did the food you bought just not last and you didn t have money to get more?: No   Transportation Needs: Low Risk  (1/5/2024)    Transportation Needs     Within the past 12 months, has lack of transportation kept you from medical appointments, getting your medicines, non-medical meetings or appointments, work, or from getting things that you need?: No   Physical Activity: Insufficiently Active (5/16/2025)    Exercise Vital Sign     Days of Exercise per Week: 7 days     Minutes of Exercise per Session: 20 min   Stress: Not on file   Social Connections: Not on file   Interpersonal Safety: Low Risk  (5/8/2025)    Interpersonal Safety     Do you feel physically and emotionally safe where you currently live?: Yes     Within the past 12 months, have you been hit, slapped, kicked or otherwise physically hurt by someone?: No     Within the past 12 months, have you been humiliated or emotionally abused in other ways by your partner or ex-partner?: No   Housing Stability: Low Risk  (1/5/2024)    Housing Stability     Do you have housing? : Yes     Are you worried about losing your housing?: No         PHYSICAL EXAMINATION:  Vitals:    05/23/25 1425   BP: 136/69   Pulse: 67   SpO2: 98%       GENERAL:  No acute distress.  Pleasant and cooperative.   PSYCH:  Normal mood and affect.  HEAD:  Normocephalic.  SPEECH:  No dysarthria.  EYES:  No scleral icterus.  EARS:  Hearing is intact to spoken voice.  NOSE/MOUTH:  Wearing a face mask.  LUNGS:  No respiratory distress.  No increased work of breathing.  VASCULAR/PULSES:  Posterior tibial:  Right 2+.  Left 2+.  LOWER EXTREMITIES: No clubbing, cyanosis, or edema bilaterally.    BALANCE AND GAIT: The patient  stands and ambulates with a mild to moderate forward trunk lean..    INSPECTION:  There is no erythema or ecchymosis of the low back.  There is lumbar scoliosis convex right.    LUMBOPELVIC PALPATION:  Lumbar Spinous Processes:  not tender.  Lumbar Paraspinals:  Right not tender.  Left not tender.  Posterior Superior Iliac Spine:  Right not tender.  Left not tender.  Superior Cluneal Nerves:  Right not tender.  Left not tender.  Iliac Crest:  Right not tender.  Left not tender.  Sacroiliac Joint:  Right not tender.  Left not tender.  Hip External Rotators:  Right not tender.  Left not tender.  Ischial Tuberosity:  Right not tender.  Left not tender.  Greater Trochanter:  Right not tender.  Left not tender.  Coccyx:  not tender.    THORACOLUMBAR RANGE OF MOTION:  Forward flexion (85 ): Mildly reduced range of motion, does not cause pain, does not cause radiating pain.  Extension (30 ): Severely reduced range of motion, does not cause pain, does not cause radiating pain.  Lateral bending right (30 ):  Severely reduced range of motion, does not cause pain, does not cause radiating pain.  Lateral bending left (30 ):  Severely reduced range of motion, does not cause pain, does not cause radiating pain.  Twisting right with extension:  Severely reduced range of motion, does not cause pain, does not cause radiating pain.  Twisting left with extension:  Severely reduced range of motion, does not cause pain, does not cause radiating pain.    SACROILIAC RANGE OF MOTION:  Standing flexion:  Right -.  Left -.  Seated flexion:  Right -.  Left -.  One-leg stork (Gillet):  Right -.  Left -.  Sacroiliac joint dysfunction:  Right -.  Left -.    HIP RANGE OF MOTION:  Flexion (110 ):  Right 105 .  Left 110 .  Extension (30 ):  Right 0 .  Left 10 .  External rotation (45 ):  Right 30 .  Left 30 .  Internal rotation (35 ):  Right 30 .  Left 30 .    KNEE RANGE OF MOTION:  Extension (0 ):  Right 15 .  Left 15 .  Flexion (135 ):  Right  120 .  Left 130 .    STRENGTH:  Hip flexion:  Right 4+/5.  Left 4+/5.  Hip abduction:  Right 4+/5.  Left 4+/5.  Hip external rotation:  Right 4+/5.  Left 4+/5.  Hip extension:  Right 4+/5.  Left 4+/5.  Knee extension:  Right 4+/5.  Left 4+/5.  Knee flexion:  Right 4+/5.  Left 4+/5.  Ankle dorsiflexion:  Right 4+/5.  Left 4+/5.  Great toe dorsiflexion:  Right 4+/5.  Left 4+/5.  Ankle plantar flexion:  Right 4+/5.  Left 4+/5.    SENSATION:  Intact to light touch along right L2, L3, L4, L5, S1, S2.  Intact to light touch along left L2, L3, L4, L5, S1, S2.    REFLEXES:  Patellar (L2-L4):  Right 2+.  Left 2+.  Achilles (S1-S2):  Right 1+.  Left 1+.  Babinski:  Right downgoing.  Left downgoing.  Forced ankle dorsiflexion (clonus):  Right 0 beats.  Left 0 beats.    LUMBOPELVIC SPECIAL TESTS:  Campbell finger:  Right -.  Left -.  Gapping / Distraction:  Right -.  Left -.  Log roll:  Right -.  Left -.  Straight-leg raise (Lasegue):  Right -.  Left -.  Crossed-straight leg raise:  Right -.  Left -.  Resisted straight leg raise:  Right +.  Left -.  FABERE (Elkin):  Right -.  Left -.  FADIR:  Right +.  Left -.  Hip scour:  Right +.  Left -.  Lateral sacral compression:  Right -.  Left -.  Clamshell:  Right -.  Left -.  Femoral nerve stretch:  Right -.  Left -.  Crossed femoral nerve stretch:  Right -.  Left -.  Ely s:  Right +.  Left +.  Yeoman s:  Right -.  Left -.  Midline sacral thrust:  Right -.  Left -.  Noble compression:  Right -.  Left -.        IMAGING:  Exam: MRI of the right hip dated 4/27/2025.     COMPARISON: CT dated 1/23/2024.     CLINICAL HISTORY: Osteoarthrosis.     TECHNIQUE: Multiplanar, multisequence MR imaging of the right hip was obtained using standard sequences in 3 orthogonal planes without the use of intravenous or intra-articular gadolinium contrast.     FINDINGS:     Small to moderate amount of fluid in the right hip joint with synovitis.     Prominent red marrow within the visualized pelvis without  marrow signal abnormalities to suggest fracture, osteonecrosis, or marrow infiltration.     Marked osteoarthrosis in the right hip joint with marked complete joint space narrowing, high-grade to full-thickness cartilage loss, degenerative labral tearing, as well as osteophytic spurring at the femoral head and neck junction.     No full-thickness tendon tear or tendon retraction. Mild fatty infiltration within the gluteus minimus muscle and vastus lateralis muscle. Subcutaneous soft tissue edema. Fluid in the region of the trochanteric bursae. The visualized sciatic nerve is unremarkable. The visualized femoral vasculature is unremarkable. No lymphadenopathy.    IMPRESSION:  1. Marked osteoarthrosis in the right hip joint with complete joint space narrowing, osteophytic spurring, high-grade to full-thickness cartilage loss and subchondral edema and cystic changes.     2. Small right hip joint effusion with synovitis.     3. Prominent red marrow without evidence of fracture, osteonecrosis, or marrow infiltration.          ASSESSMENT/PLAN:  Ms. Tricia Sosa is an 84-year-old, right-hand-dominant, female.    Assessment & Plan  Primary osteoarthritis of right hip:  - Hip pain is likely due to arthritis in the right hip. The pain is mostly present when lying flat, possibly due to reaching the end range of motion in the hip. Degenerative disc disease and arthritis in the facet joints could be contributing to the pain, but strength, sensation, and reflexes are equal side to side, making radiculopathy in the low back less likely.  - Consideration of an injection into the hip. Recommendation to wait before proceeding with another injection due to recent steroid shots. Follow-up appointment to be scheduled in 2-3 months to reassess the condition and discuss further management options.  - Risks and side effects: Acknowledgment of the potential risk of receiving a large amount of steroid at once.        Total time on the date  of the encounter:  47 minutes were spent on one more or more of the following:  discussion with patient, history, exam, coordinating care, treatment goals, record review, documenting clinical information, and/or data review.    Consent was obtained from the patient to use an AI documentation tool in the creation of this note.      Gurjit Reza MD

## 2025-05-23 NOTE — LETTER
"5/23/2025      Tricia Sosa  [address]  [facility] [id number]      Dear Colleague,    Thank you for referring your patient, Tricia Sosa, to the [facility]. Please see a copy of my visit note below.    PHYSICAL MEDICINE & REHABILITATION / MEDICAL SPINE        Date:  May 23, 2025    Name:  Tricia Sosa  YOB: 1940  MRN:  [id number]      \"I am using a new tool to help me save time with documentation.  Basically it is going to listen to our visit today and uses AI to help me draft my note.  Is it okay if I use this tool today?\"        CHART REVIEW:  Reviewed 02/25/2025 note from Amy Sosa MD (rheumatology).  Ms. Tricia Sosa had seronegative rheumatoid arthritis.     Reviewed 02/07/2025 note from Marley Heart MD (physical medicine and rehabilitation).  Ms. Tricia Sosawas seen in the emergency department on 01/30/2025.  She had neck and head pain and neck stiffness since August 2024.  She also had pain in her hands and knees around the same time.  Ms. Tricia Sosa was to follow-up with her primary care and rheumatology.         CHIEF COMPLAINT:  Right greater than left groin pain        HISTORY OF PRESENT ILLNESS:  Ms. Tricia Sosa is an 84-year-old, right-hand-dominant, female.  She has common variable immunodeficiency, autoimmune hemolytic anemia, seronegative rheumatoid arthritis, and polyneuropathy.  Ms. Guaman denied any other personal history of autoimmune diseases, rheumatologic diseases, gout, pseudogout, neurologic diseases, inflammatory bowel diseases.     Ms. Tricia Sosa stated that in 1961 she fell off a horse and injured her right thumb.  Ms. Sosa denied any other injuries of her head, neck, upper back, mid back, chest, shoulders, arms, elbows, forearms, wrist, hands, fingers.     Ms. Tricia Sosa undergoes regular IgG infusions and rituximab treatments.    History of Present Illness-  - Tricia Sosa, 84-year-old female.  - " Received shots in both knees and shoulder on Tuesday, resulting in significant pain relief for three nights and improved ability to rise from chairs.  - Experiencing pain in the hip, neck, and both shoulders, with pain radiating into the arms at night, limiting arm movement.  - Hip pain located in the groin area, ongoing for several months, disturbing sleep and causing extreme tiredness.  - On gabapentin for hip pain; 100 mg and 200 mg doses were ineffective, but 300 mg provided some relief.  - Uses a wheelchair for transportation due to difficulty rising from seats; does not use it at home.  - Reports pain in knees during exercises and certain movements, with popping sounds in knees.  - Hip pain has been present since at least August 2024, with a history of intermittent sharp pain during leg movement in and out of a car since around 2014.  - Describes current hip pain as a disturbing ache at night, with occasional feelings of the leg giving out.  - Reports weakness in legs, poor balance, and difficulty rising from chairs, requiring extra cushions.  - Experiences numbness in the bottom of both feet, but no tingling or pins and needles.  - Has had at least two falls per year in recent years, with the last fall occurring in June.  - Stopped walking outside since October due to weakness and balance issues.  - History of extensive back pain many years ago, treated with Zomax.  - Tried Tylenol for hip pain without relief; gabapentin at 300 mg is the only prescription medication that has helped.  - Reports no significant back pain currently, but hip pain persists at night.        ALLERGIES:  Allergies   Allergen Reactions     Doxycycline          MEDICATIONS:  Current Outpatient Medications   Medication Sig Dispense Refill     cyanocobalamin (VITAMIN  B-12) 1000 MCG tablet   3     diclofenac (VOLTAREN) 1 % topical gel Apply 4 g topically 4 times daily as needed for moderate pain. 150 g 3     DULoxetine (CYMBALTA) 30 MG  capsule Take 1 capsule (30 mg) by mouth daily. 30 capsule 1     folic acid (FOLVITE) 1 MG tablet   3     gabapentin (NEURONTIN) 300 MG capsule Take 1 capsule (300 mg) by mouth at bedtime. 30 capsule 1     hydroCHLOROthiazide 12.5 MG tablet TAKE 1 TABLET AS NEEDED FOR EDEMA 30 tablet 2     ketoconazole (NIZORAL) 2 % external shampoo Use every other day to scalp as needed 120 mL 0     levothyroxine (SYNTHROID/LEVOTHROID) 150 MCG tablet Take 1 tablet (150 mcg) by mouth daily 90 tablet 3     methotrexate 2.5 MG tablet Take 6 tablets (15 mg) by mouth every 7 days. 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    Hemorrhage Subdural Trauma Without LOC Active     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, and posterior vitreous detachment     Urinary tract infection, site not specified     Recurrent UTI's         PAST SURGICAL HISTORY:  Past Surgical History:   Procedure Laterality Date     BIOPSY MUSCLE DIAGNOSTIC (LOCATION) Right 1/16/2024    Procedure: BIOPSY, MUSCLE - RIGHT THIGH;  Surgeon: Dickson Madrid MD;  Location: [facility] OR     COLONOSCOPY N/A 4/26/2016    Procedure: COLONOSCOPY;  Surgeon: Dontae Del Rio MD;  Location:  GI     COLONOSCOPY N/A 2/22/2024    Procedure: Colonoscopy with polypectomy by using cold snare and two hemoclips applied;  Surgeon: Dontae Del Rio MD;  Location:  GI      ENT SURGERY      Thyroid lobectomy     ESOPHAGOSCOPY, GASTROSCOPY, DUODENOSCOPY (EGD), COMBINED N/A 2024    Procedure: ESOPHAGOGASTRODUODENOSCOPY, WITH BIOPSY by using cold forcep;  Surgeon: Dontae Del Rio MD;  Location: [facility] GI     EYE SURGERY Bilateral 2021    Cataract Surgery      CYSTOSCOPY,INSERT URETERAL STENT      Ureteral stent insertion     HC FLEX SIGMOIDOSCOPY W BIOPSY  00      LAPAROSCOPY, SURGICAL; CHOLECYSTECTOMY        THYROIDECTOMY       LIGATION OF HEMORRHOID(S)      Hemorrhoidectomy     UPPER GI ENDOSCOPY,BIOPSY  10/01/01     ZZC LIGATE FALLOPIAN TUBE       ZZHC COLONOSCOPY W BIOPSY  00     ZZHC COLONOSCOPY W BIOPSY  10/8/03     ZZHC COLONOSCOPY W BIOPSY  05    REPEAT IN 5 YEARS         FAMILY HISTORY:  Family History   Problem Relation Age of Onset     Colon Cancer Mother 90     Cerebrovascular Disease Father          at age 85     Breast Cancer Sister      Hyperlipidemia Sister      Depression Sister      Anxiety Disorder Sister      Thyroid Disease Sister      Lung Cancer Sister      Breast Cancer Sister      Other Cancer Sister         Lung     Dementia Brother      Prostate Cancer Brother      Thyroid Disease Brother      Dementia Brother      Thyroid Disease Brother      Pancreatic Cancer Brother      Colon Cancer Niece      Other Cancer Niece         leukemia         SOCIAL HISTORY:  Social History     Socioeconomic History     Marital status:      Spouse name: Not on file     Number of children: Not on file     Years of education: Not on file     Highest education level: Not on file   Occupational History     Not on file   Tobacco Use     Smoking status: Never     Passive exposure: Never     Smokeless tobacco: Never   Vaping Use     Vaping status: Never Used   Substance and Sexual Activity     Alcohol use: Not Currently     Comment: 1 a day at most     Drug use: No     Sexual activity: Not Currently     Partners: Male   Other Topics  Concern     Parent/sibling w/ CABG, MI or angioplasty before 65F 55M? No   Social History Narrative     Not on file     Social Drivers of Health     Financial Resource Strain: Low Risk  (1/5/2024)    Financial Resource Strain      Within the past 12 months, have you or your family members you live with been unable to get utilities (heat, electricity) when it was really needed?: No   Food Insecurity: Low Risk  (1/5/2024)    Food Insecurity      Within the past 12 months, did you worry that your food would run out before you got money to buy more?: No      Within the past 12 months, did the food you bought just not last and you didn t have money to get more?: No   Transportation Needs: Low Risk  (1/5/2024)    Transportation Needs      Within the past 12 months, has lack of transportation kept you from medical appointments, getting your medicines, non-medical meetings or appointments, work, or from getting things that you need?: No   Physical Activity: Insufficiently Active (5/16/2025)    Exercise Vital Sign      Days of Exercise per Week: 7 days      Minutes of Exercise per Session: 20 min   Stress: Not on file   Social Connections: Not on file   Interpersonal Safety: Low Risk  (5/8/2025)    Interpersonal Safety      Do you feel physically and emotionally safe where you currently live?: Yes      Within the past 12 months, have you been hit, slapped, kicked or otherwise physically hurt by someone?: No      Within the past 12 months, have you been humiliated or emotionally abused in other ways by your partner or ex-partner?: No   Housing Stability: Low Risk  (1/5/2024)    Housing Stability      Do you have housing? : Yes      Are you worried about losing your housing?: No         PHYSICAL EXAMINATION:  Vitals:    05/23/25 1425   BP: 136/69   Pulse: 67   SpO2: 98%       GENERAL:  No acute distress.  Pleasant and cooperative.   PSYCH:  Normal mood and affect.  HEAD:  Normocephalic.  SPEECH:  No dysarthria.  EYES:  No  scleral icterus.  EARS:  Hearing is intact to spoken voice.  NOSE/MOUTH:  Wearing a face mask.  LUNGS:  No respiratory distress.  No increased work of breathing.  VASCULAR/PULSES:  Posterior tibial:  Right 2+.  Left 2+.  LOWER EXTREMITIES: No clubbing, cyanosis, or edema bilaterally.    BALANCE AND GAIT: The patient stands and ambulates with a mild to moderate forward trunk lean..    INSPECTION:  There is no erythema or ecchymosis of the low back.  There is lumbar scoliosis convex right.    LUMBOPELVIC PALPATION:  Lumbar Spinous Processes:  not tender.  Lumbar Paraspinals:  Right not tender.  Left not tender.  Posterior Superior Iliac Spine:  Right not tender.  Left not tender.  Superior Cluneal Nerves:  Right not tender.  Left not tender.  Iliac Crest:  Right not tender.  Left not tender.  Sacroiliac Joint:  Right not tender.  Left not tender.  Hip External Rotators:  Right not tender.  Left not tender.  Ischial Tuberosity:  Right not tender.  Left not tender.  Greater Trochanter:  Right not tender.  Left not tender.  Coccyx:  not tender.    THORACOLUMBAR RANGE OF MOTION:  Forward flexion (85 ): Mildly reduced range of motion, does not cause pain, does not cause radiating pain.  Extension (30 ): Severely reduced range of motion, does not cause pain, does not cause radiating pain.  Lateral bending right (30 ):  Severely reduced range of motion, does not cause pain, does not cause radiating pain.  Lateral bending left (30 ):  Severely reduced range of motion, does not cause pain, does not cause radiating pain.  Twisting right with extension:  Severely reduced range of motion, does not cause pain, does not cause radiating pain.  Twisting left with extension:  Severely reduced range of motion, does not cause pain, does not cause radiating pain.    SACROILIAC RANGE OF MOTION:  Standing flexion:  Right -.  Left -.  Seated flexion:  Right -.  Left -.  One-leg stork (Gillet):  Right -.  Left -.  Sacroiliac joint  dysfunction:  Right -.  Left -.    HIP RANGE OF MOTION:  Flexion (110 ):  Right 105 .  Left 110 .  Extension (30 ):  Right 0 .  Left 10 .  External rotation (45 ):  Right 30 .  Left 30 .  Internal rotation (35 ):  Right 30 .  Left 30 .    KNEE RANGE OF MOTION:  Extension (0 ):  Right 15 .  Left 15 .  Flexion (135 ):  Right 120 .  Left 130 .    STRENGTH:  Hip flexion:  Right 4+/5.  Left 4+/5.  Hip abduction:  Right 4+/5.  Left 4+/5.  Hip external rotation:  Right 4+/5.  Left 4+/5.  Hip extension:  Right 4+/5.  Left 4+/5.  Knee extension:  Right 4+/5.  Left 4+/5.  Knee flexion:  Right 4+/5.  Left 4+/5.  Ankle dorsiflexion:  Right 4+/5.  Left 4+/5.  Great toe dorsiflexion:  Right 4+/5.  Left 4+/5.  Ankle plantar flexion:  Right 4+/5.  Left 4+/5.    SENSATION:  Intact to light touch along right L2, L3, L4, L5, S1, S2.  Intact to light touch along left L2, L3, L4, L5, S1, S2.    REFLEXES:  Patellar (L2-L4):  Right 2+.  Left 2+.  Achilles (S1-S2):  Right 1+.  Left 1+.  Babinski:  Right downgoing.  Left downgoing.  Forced ankle dorsiflexion (clonus):  Right 0 beats.  Left 0 beats.    LUMBOPELVIC SPECIAL TESTS:  Campbell finger:  Right -.  Left -.  Gapping / Distraction:  Right -.  Left -.  Log roll:  Right -.  Left -.  Straight-leg raise (Lasegue):  Right -.  Left -.  Crossed-straight leg raise:  Right -.  Left -.  Resisted straight leg raise:  Right +.  Left -.  FABERE (Elkin):  Right -.  Left -.  FADIR:  Right +.  Left -.  Hip scour:  Right +.  Left -.  Lateral sacral compression:  Right -.  Left -.  Clamshell:  Right -.  Left -.  Femoral nerve stretch:  Right -.  Left -.  Crossed femoral nerve stretch:  Right -.  Left -.  Ely s:  Right +.  Left +.  Yeoman s:  Right -.  Left -.  Midline sacral thrust:  Right -.  Left -.  Noble compression:  Right -.  Left -.        IMAGING:  Exam: MRI of the right hip dated 4/27/2025.     COMPARISON: CT dated 1/23/2024.     CLINICAL HISTORY: Osteoarthrosis.     TECHNIQUE: Multiplanar,  multisequence MR imaging of the right hip was obtained using standard sequences in 3 orthogonal planes without the use of intravenous or intra-articular gadolinium contrast.     FINDINGS:     Small to moderate amount of fluid in the right hip joint with synovitis.     Prominent red marrow within the visualized pelvis without marrow signal abnormalities to suggest fracture, osteonecrosis, or marrow infiltration.     Marked osteoarthrosis in the right hip joint with marked complete joint space narrowing, high-grade to full-thickness cartilage loss, degenerative labral tearing, as well as osteophytic spurring at the femoral head and neck junction.     No full-thickness tendon tear or tendon retraction. Mild fatty infiltration within the gluteus minimus muscle and vastus lateralis muscle. Subcutaneous soft tissue edema. Fluid in the region of the trochanteric bursae. The visualized sciatic nerve is unremarkable. The visualized femoral vasculature is unremarkable. No lymphadenopathy.    IMPRESSION:  1. Marked osteoarthrosis in the right hip joint with complete joint space narrowing, osteophytic spurring, high-grade to full-thickness cartilage loss and subchondral edema and cystic changes.     2. Small right hip joint effusion with synovitis.     3. Prominent red marrow without evidence of fracture, osteonecrosis, or marrow infiltration.          ASSESSMENT/PLAN:  Ms. Tricia Sosa is an 84-year-old, right-hand-dominant, female.    Assessment & Plan  Primary osteoarthritis of right hip:  - Hip pain is likely due to arthritis in the right hip. The pain is mostly present when lying flat, possibly due to reaching the end range of motion in the hip. Degenerative disc disease and arthritis in the facet joints could be contributing to the pain, but strength, sensation, and reflexes are equal side to side, making radiculopathy in the low back less likely.  - Consideration of an injection into the hip. Recommendation to wait  before proceeding with another injection due to recent steroid shots. Follow-up appointment to be scheduled in 2-3 months to reassess the condition and discuss further management options.  - Risks and side effects: Acknowledgment of the potential risk of receiving a large amount of steroid at once.        Total time on the date of the encounter:  47 minutes were spent on one more or more of the following:  discussion with patient, history, exam, coordinating care, treatment goals, record review, documenting clinical information, and/or data review.    Consent was obtained from the patient to use an AI documentation tool in the creation of this note.      Gurjit Reza MD        Again, thank you for allowing me to participate in the care of your patient.        Sincerely,        Gurjit Reza MD    Electronically signed

## 2025-06-02 ENCOUNTER — THERAPY VISIT (OUTPATIENT)
Dept: PHYSICAL THERAPY | Facility: CLINIC | Age: 85
End: 2025-06-02
Payer: MEDICARE

## 2025-06-02 DIAGNOSIS — D80.3 SELECTIVE DEFICIENCY OF IGG SUBCLASSES (H): ICD-10-CM

## 2025-06-02 DIAGNOSIS — D83.9 CVID (COMMON VARIABLE IMMUNODEFICIENCY) (H): Primary | ICD-10-CM

## 2025-06-02 DIAGNOSIS — M17.0 OSTEOARTHRITIS OF PATELLOFEMORAL JOINTS OF BOTH KNEES: Primary | ICD-10-CM

## 2025-06-02 PROCEDURE — 97110 THERAPEUTIC EXERCISES: CPT | Mod: GP | Performed by: PHYSICAL THERAPIST

## 2025-06-09 ENCOUNTER — THERAPY VISIT (OUTPATIENT)
Dept: PHYSICAL THERAPY | Facility: CLINIC | Age: 85
End: 2025-06-09
Payer: MEDICARE

## 2025-06-09 DIAGNOSIS — M17.0 OSTEOARTHRITIS OF PATELLOFEMORAL JOINTS OF BOTH KNEES: Primary | ICD-10-CM

## 2025-06-09 PROCEDURE — 97110 THERAPEUTIC EXERCISES: CPT | Mod: GP | Performed by: PHYSICAL THERAPIST

## 2025-06-12 ENCOUNTER — LAB (OUTPATIENT)
Dept: ONCOLOGY | Facility: CLINIC | Age: 85
End: 2025-06-12
Attending: PHYSICIAN ASSISTANT
Payer: MEDICARE

## 2025-06-12 ENCOUNTER — TELEPHONE (OUTPATIENT)
Dept: ONCOLOGY | Facility: CLINIC | Age: 85
End: 2025-06-12
Payer: MEDICARE

## 2025-06-12 DIAGNOSIS — D80.3 SELECTIVE DEFICIENCY OF IGG SUBCLASSES (H): ICD-10-CM

## 2025-06-12 DIAGNOSIS — M19.049 HAND ARTHRITIS: ICD-10-CM

## 2025-06-12 DIAGNOSIS — D59.10 AUTOIMMUNE HEMOLYTIC ANEMIA (H): ICD-10-CM

## 2025-06-12 DIAGNOSIS — D83.9 CVID (COMMON VARIABLE IMMUNODEFICIENCY) (H): ICD-10-CM

## 2025-06-12 LAB
ALBUMIN SERPL BCG-MCNC: 3.8 G/DL (ref 3.5–5.2)
ALT SERPL W P-5'-P-CCNC: 14 U/L (ref 0–50)
BASOPHILS # BLD AUTO: 0 10E3/UL (ref 0–0.2)
BASOPHILS NFR BLD AUTO: 0 %
CREAT SERPL-MCNC: 0.43 MG/DL (ref 0.51–0.95)
EGFRCR SERPLBLD CKD-EPI 2021: >90 ML/MIN/1.73M2
EOSINOPHIL # BLD AUTO: 0 10E3/UL (ref 0–0.7)
EOSINOPHIL NFR BLD AUTO: 0 %
ERYTHROCYTE [DISTWIDTH] IN BLOOD BY AUTOMATED COUNT: 17.4 % (ref 10–15)
HCT VFR BLD AUTO: 31.4 % (ref 35–47)
HGB BLD-MCNC: 10.8 G/DL (ref 11.7–15.7)
IMM GRANULOCYTES # BLD: 0 10E3/UL
IMM GRANULOCYTES NFR BLD: 0 %
LYMPHOCYTES # BLD AUTO: 2.5 10E3/UL (ref 0.8–5.3)
LYMPHOCYTES NFR BLD AUTO: 41 %
MCH RBC QN AUTO: 33.9 PG (ref 26.5–33)
MCHC RBC AUTO-ENTMCNC: 34.4 G/DL (ref 31.5–36.5)
MCV RBC AUTO: 98 FL (ref 78–100)
MONOCYTES # BLD AUTO: 0.6 10E3/UL (ref 0–1.3)
MONOCYTES NFR BLD AUTO: 10 %
NEUTROPHILS # BLD AUTO: 3.1 10E3/UL (ref 1.6–8.3)
NEUTROPHILS NFR BLD AUTO: 49 %
NRBC # BLD AUTO: 0 10E3/UL
NRBC BLD AUTO-RTO: 0 /100
PLATELET # BLD AUTO: 158 10E3/UL (ref 150–450)
RBC # BLD AUTO: 3.19 10E6/UL (ref 3.8–5.2)
WBC # BLD AUTO: 6.3 10E3/UL (ref 4–11)

## 2025-06-12 PROCEDURE — 85004 AUTOMATED DIFF WBC COUNT: CPT | Performed by: INTERNAL MEDICINE

## 2025-06-12 PROCEDURE — 82787 IGG 1 2 3 OR 4 EACH: CPT | Performed by: PHYSICIAN ASSISTANT

## 2025-06-12 PROCEDURE — 84460 ALANINE AMINO (ALT) (SGPT): CPT | Performed by: INTERNAL MEDICINE

## 2025-06-12 PROCEDURE — 82040 ASSAY OF SERUM ALBUMIN: CPT | Performed by: INTERNAL MEDICINE

## 2025-06-12 PROCEDURE — 82784 ASSAY IGA/IGD/IGG/IGM EACH: CPT | Performed by: PHYSICIAN ASSISTANT

## 2025-06-12 PROCEDURE — 82565 ASSAY OF CREATININE: CPT | Performed by: INTERNAL MEDICINE

## 2025-06-12 RX ORDER — HUMAN IMMUNOGLOBULIN G 20 G/200ML
35 LIQUID INTRAVENOUS ONCE
Start: 2025-06-16 | End: 2025-06-16

## 2025-06-12 NOTE — PROGRESS NOTES
Medical Assistant Note:  Tricia Sosa presents today for blood draw.    Patient seen by provider today: No.   present during visit today: Not Applicable.    Concerns: No Concerns.    Procedure:  Lab draw site: right antecub, Needle type: butterfly, Gauge: 23.    Post Assessment:  Labs drawn without difficulty: Yes.    Discharge Plan:  Departure Mode: Motorized Wheelchair.    Face to Face Time: 10 minutes.    Hawa Victor MA

## 2025-06-16 ENCOUNTER — INFUSION THERAPY VISIT (OUTPATIENT)
Dept: INFUSION THERAPY | Facility: CLINIC | Age: 85
End: 2025-06-16
Attending: PHYSICIAN ASSISTANT
Payer: MEDICARE

## 2025-06-16 VITALS
WEIGHT: 142 LBS | SYSTOLIC BLOOD PRESSURE: 130 MMHG | HEART RATE: 77 BPM | TEMPERATURE: 98.6 F | BODY MASS INDEX: 19.8 KG/M2 | DIASTOLIC BLOOD PRESSURE: 69 MMHG | OXYGEN SATURATION: 95 %

## 2025-06-16 DIAGNOSIS — D59.19 OTHER AUTOIMMUNE HEMOLYTIC ANEMIA (H): Primary | ICD-10-CM

## 2025-06-16 DIAGNOSIS — D59.10 AUTOIMMUNE HEMOLYTIC ANEMIA (H): ICD-10-CM

## 2025-06-16 DIAGNOSIS — D83.9 CVID (COMMON VARIABLE IMMUNODEFICIENCY) (H): ICD-10-CM

## 2025-06-16 DIAGNOSIS — D80.3 SELECTIVE DEFICIENCY OF IGG SUBCLASSES (H): ICD-10-CM

## 2025-06-16 LAB
BASOPHILS # BLD AUTO: 0 10E3/UL (ref 0–0.2)
BASOPHILS NFR BLD AUTO: 0 %
EOSINOPHIL # BLD AUTO: 0 10E3/UL (ref 0–0.7)
EOSINOPHIL NFR BLD AUTO: 0 %
ERYTHROCYTE [DISTWIDTH] IN BLOOD BY AUTOMATED COUNT: 17.9 % (ref 10–15)
HCT VFR BLD AUTO: 31.2 % (ref 35–47)
HGB BLD-MCNC: 10.5 G/DL (ref 11.7–15.7)
IMM GRANULOCYTES # BLD: 0 10E3/UL
IMM GRANULOCYTES NFR BLD: 1 %
LYMPHOCYTES # BLD AUTO: 2.6 10E3/UL (ref 0.8–5.3)
LYMPHOCYTES NFR BLD AUTO: 40 %
MCH RBC QN AUTO: 33.4 PG (ref 26.5–33)
MCHC RBC AUTO-ENTMCNC: 33.7 G/DL (ref 31.5–36.5)
MCV RBC AUTO: 99 FL (ref 78–100)
MONOCYTES # BLD AUTO: 0.7 10E3/UL (ref 0–1.3)
MONOCYTES NFR BLD AUTO: 12 %
NEUTROPHILS # BLD AUTO: 3.1 10E3/UL (ref 1.6–8.3)
NEUTROPHILS NFR BLD AUTO: 48 %
NRBC # BLD AUTO: 0 10E3/UL
NRBC BLD AUTO-RTO: 0 /100
PLATELET # BLD AUTO: 170 10E3/UL (ref 150–450)
RBC # BLD AUTO: 3.14 10E6/UL (ref 3.8–5.2)
WBC # BLD AUTO: 6.4 10E3/UL (ref 4–11)

## 2025-06-16 PROCEDURE — 258N000003 HC RX IP 258 OP 636: Performed by: PHYSICIAN ASSISTANT

## 2025-06-16 PROCEDURE — 96367 TX/PROPH/DG ADDL SEQ IV INF: CPT

## 2025-06-16 PROCEDURE — 250N000011 HC RX IP 250 OP 636: Mod: JZ | Performed by: PHYSICIAN ASSISTANT

## 2025-06-16 PROCEDURE — 36415 COLL VENOUS BLD VENIPUNCTURE: CPT | Performed by: PHYSICIAN ASSISTANT

## 2025-06-16 PROCEDURE — 96375 TX/PRO/DX INJ NEW DRUG ADDON: CPT

## 2025-06-16 PROCEDURE — 250N000013 HC RX MED GY IP 250 OP 250 PS 637: Performed by: PHYSICIAN ASSISTANT

## 2025-06-16 PROCEDURE — 85025 COMPLETE CBC W/AUTO DIFF WBC: CPT | Performed by: PHYSICIAN ASSISTANT

## 2025-06-16 PROCEDURE — 96366 THER/PROPH/DIAG IV INF ADDON: CPT

## 2025-06-16 PROCEDURE — 96413 CHEMO IV INFUSION 1 HR: CPT

## 2025-06-16 RX ORDER — METHYLPREDNISOLONE SODIUM SUCCINATE 125 MG/2ML
125 INJECTION INTRAMUSCULAR; INTRAVENOUS
Status: CANCELLED
Start: 2025-06-16

## 2025-06-16 RX ORDER — ALBUTEROL SULFATE 0.83 MG/ML
2.5 SOLUTION RESPIRATORY (INHALATION)
OUTPATIENT
Start: 2025-08-11

## 2025-06-16 RX ORDER — MEPERIDINE HYDROCHLORIDE 25 MG/ML
25 INJECTION INTRAMUSCULAR; INTRAVENOUS; SUBCUTANEOUS
OUTPATIENT
Start: 2025-08-11

## 2025-06-16 RX ORDER — DIPHENHYDRAMINE HCL 25 MG
50 CAPSULE ORAL ONCE
Status: COMPLETED | OUTPATIENT
Start: 2025-06-16 | End: 2025-06-16

## 2025-06-16 RX ORDER — DIPHENHYDRAMINE HYDROCHLORIDE 50 MG/ML
25 INJECTION, SOLUTION INTRAMUSCULAR; INTRAVENOUS ONCE
Status: CANCELLED
Start: 2025-06-16 | End: 2025-06-16

## 2025-06-16 RX ORDER — ALBUTEROL SULFATE 90 UG/1
1-2 INHALANT RESPIRATORY (INHALATION)
Start: 2025-08-11

## 2025-06-16 RX ORDER — DIPHENHYDRAMINE HYDROCHLORIDE 50 MG/ML
25 INJECTION, SOLUTION INTRAMUSCULAR; INTRAVENOUS
Status: CANCELLED
Start: 2025-06-16

## 2025-06-16 RX ORDER — METHYLPREDNISOLONE SODIUM SUCCINATE 40 MG/ML
20 INJECTION INTRAMUSCULAR; INTRAVENOUS ONCE
Status: COMPLETED | OUTPATIENT
Start: 2025-06-16 | End: 2025-06-16

## 2025-06-16 RX ORDER — DIPHENHYDRAMINE HYDROCHLORIDE 50 MG/ML
50 INJECTION, SOLUTION INTRAMUSCULAR; INTRAVENOUS
Start: 2025-08-11

## 2025-06-16 RX ORDER — METHYLPREDNISOLONE SODIUM SUCCINATE 40 MG/ML
20 INJECTION INTRAMUSCULAR; INTRAVENOUS ONCE
Start: 2025-08-11 | End: 2025-08-11

## 2025-06-16 RX ORDER — DIPHENHYDRAMINE HYDROCHLORIDE 50 MG/ML
50 INJECTION, SOLUTION INTRAMUSCULAR; INTRAVENOUS
Status: CANCELLED
Start: 2025-06-16

## 2025-06-16 RX ORDER — METHYLPREDNISOLONE SODIUM SUCCINATE 40 MG/ML
40 INJECTION INTRAMUSCULAR; INTRAVENOUS
Status: CANCELLED
Start: 2025-06-16

## 2025-06-16 RX ORDER — DIPHENHYDRAMINE HYDROCHLORIDE 50 MG/ML
25 INJECTION, SOLUTION INTRAMUSCULAR; INTRAVENOUS ONCE
Status: COMPLETED | OUTPATIENT
Start: 2025-06-16 | End: 2025-06-16

## 2025-06-16 RX ORDER — DIPHENHYDRAMINE HYDROCHLORIDE 50 MG/ML
25 INJECTION, SOLUTION INTRAMUSCULAR; INTRAVENOUS
Start: 2025-08-11

## 2025-06-16 RX ORDER — ALBUTEROL SULFATE 0.83 MG/ML
2.5 SOLUTION RESPIRATORY (INHALATION)
Status: CANCELLED | OUTPATIENT
Start: 2025-06-16

## 2025-06-16 RX ORDER — ACETAMINOPHEN 325 MG/1
650 TABLET ORAL ONCE
Status: COMPLETED | OUTPATIENT
Start: 2025-06-16 | End: 2025-06-16

## 2025-06-16 RX ORDER — HUMAN IMMUNOGLOBULIN G 20 G/200ML
35 LIQUID INTRAVENOUS ONCE
Status: COMPLETED | OUTPATIENT
Start: 2025-06-16 | End: 2025-06-16

## 2025-06-16 RX ORDER — HEPARIN SODIUM (PORCINE) LOCK FLUSH IV SOLN 100 UNIT/ML 100 UNIT/ML
5 SOLUTION INTRAVENOUS
OUTPATIENT
Start: 2025-08-11

## 2025-06-16 RX ORDER — HUMAN IMMUNOGLOBULIN G 20 G/200ML
35 LIQUID INTRAVENOUS ONCE
Start: 2025-08-11 | End: 2025-08-11

## 2025-06-16 RX ORDER — EPINEPHRINE 1 MG/ML
0.3 INJECTION, SOLUTION INTRAMUSCULAR; SUBCUTANEOUS EVERY 5 MIN PRN
OUTPATIENT
Start: 2025-08-11

## 2025-06-16 RX ORDER — MEPERIDINE HYDROCHLORIDE 25 MG/ML
25 INJECTION INTRAMUSCULAR; INTRAVENOUS; SUBCUTANEOUS
Status: CANCELLED
Start: 2025-06-16

## 2025-06-16 RX ORDER — HEPARIN SODIUM,PORCINE 10 UNIT/ML
5 VIAL (ML) INTRAVENOUS
OUTPATIENT
Start: 2025-08-11

## 2025-06-16 RX ORDER — HEPARIN SODIUM (PORCINE) LOCK FLUSH IV SOLN 100 UNIT/ML 100 UNIT/ML
5 SOLUTION INTRAVENOUS
Status: CANCELLED | OUTPATIENT
Start: 2025-06-16

## 2025-06-16 RX ORDER — ACETAMINOPHEN 325 MG/1
650 TABLET ORAL ONCE
Status: CANCELLED | OUTPATIENT
Start: 2025-06-16

## 2025-06-16 RX ORDER — HEPARIN SODIUM,PORCINE 10 UNIT/ML
5 VIAL (ML) INTRAVENOUS
Status: CANCELLED | OUTPATIENT
Start: 2025-06-16

## 2025-06-16 RX ORDER — DIPHENHYDRAMINE HCL 25 MG
50 CAPSULE ORAL ONCE
Status: CANCELLED | OUTPATIENT
Start: 2025-06-16

## 2025-06-16 RX ORDER — MEPERIDINE HYDROCHLORIDE 25 MG/ML
25 INJECTION INTRAMUSCULAR; INTRAVENOUS; SUBCUTANEOUS
Status: CANCELLED | OUTPATIENT
Start: 2025-06-16

## 2025-06-16 RX ORDER — METHYLPREDNISOLONE SODIUM SUCCINATE 40 MG/ML
40 INJECTION INTRAMUSCULAR; INTRAVENOUS
Start: 2025-08-11

## 2025-06-16 RX ORDER — EPINEPHRINE 1 MG/ML
0.3 INJECTION, SOLUTION INTRAMUSCULAR; SUBCUTANEOUS EVERY 5 MIN PRN
Status: CANCELLED | OUTPATIENT
Start: 2025-06-16

## 2025-06-16 RX ORDER — ALBUTEROL SULFATE 90 UG/1
1-2 INHALANT RESPIRATORY (INHALATION)
Status: CANCELLED
Start: 2025-06-16

## 2025-06-16 RX ADMIN — HUMAN IMMUNOGLOBULIN G 35 G: 20 LIQUID INTRAVENOUS at 11:52

## 2025-06-16 RX ADMIN — METHYLPREDNISOLONE SODIUM SUCCINATE 20 MG: 40 INJECTION, POWDER, FOR SOLUTION INTRAMUSCULAR; INTRAVENOUS at 11:44

## 2025-06-16 RX ADMIN — DIPHENHYDRAMINE HYDROCHLORIDE 25 MG: 25 CAPSULE ORAL at 14:05

## 2025-06-16 RX ADMIN — RITUXIMAB-ABBS 700 MG: 10 INJECTION, SOLUTION INTRAVENOUS at 14:11

## 2025-06-16 RX ADMIN — ACETAMINOPHEN 325 MG: 325 TABLET ORAL at 14:05

## 2025-06-16 NOTE — PROGRESS NOTES
Infusion Nursing Note:  Tricia Sosa presents today for IVIG plus Truxima.    Patient seen by provider today: No   present during visit today: Not Applicable.    Note: Today is first IVIG infusion using IBW. Initiated at rate of 0.5ml/kg/hr. Increased by 0.5ml/kg/hr every 15 minutes until completion, with max rate of 4.5ml/kg/hr being reached      Intravenous Access:  Lab draw site left lower forearm, Needle type angiocath; Gauge: 22.  Labs drawn without difficulty.  Peripheral IV placed.    Treatment Conditions:  Biological Infusion Checklist:  ~~~ NOTE: If the patient answers yes to any of the questions below, hold the infusion and contact ordering provider or on-call provider.    Have you recently had an elevated temperature, fever, chills, productive cough, coughing for 3 weeks or longer or hemoptysis,  abnormal vital signs, night sweats,  chest pain or have you noticed a decrease in your appetite, unexplained weight loss or fatigue? No  Do you have any open wounds or new incisions? No  Do you have any upcoming hospitalizations or surgeries? Does not include esophagogastroduodenoscopy, colonoscopy, endoscopic retrograde cholangiopancreatography (ERCP), endoscopic ultrasound (EUS), dental procedures or joint aspiration/steroid injections No  Do you currently have any signs of illness or infection or are you on any antibiotics? No  Have you had any new, sudden or worsening abdominal pain? No  Have you or anyone in your household received a live vaccination in the past 4 weeks? Please note: No live vaccines while on biologic/chemotherapy until 6 months after the last treatment. Patient can receive the flu vaccine (shot only), pneumovax and the Covid vaccine. It is optimal for the patient to get these vaccines mid cycle, but they can be given at any time as long as it is not on the day of the infusion. No  Have you recently been diagnosed with any new nervous system diseases (ie. Multiple sclerosis,  Guillain Lawrence, seizures, neurological changes) or cancer diagnosis? Are you on any form of radiation or chemotherapy? No  Are you pregnant or breast feeding or do you have plans of pregnancy in the future? No  Have there been any other new onset medical symptoms? No  Have you had any new blood clots? (IVIG only) No      Post Infusion Assessment:  Patient tolerated infusion without incident.  Blood return noted pre and post infusion.  Site patent and intact, free from redness, edema or discomfort.  No evidence of extravasations.  Access discontinued per protocol.       Discharge Plan:   AVS to patient via MYCHART.  Patient will return 8/8/25 for RC and 8/11/25 for treatment  Patient discharged in stable condition accompanied by: self.  Departure Mode: Wheelchair/[employer] Mobility      Estrellita Davis RN

## 2025-06-19 ENCOUNTER — OFFICE VISIT (OUTPATIENT)
Dept: ORTHOPEDICS | Facility: CLINIC | Age: 85
End: 2025-06-19
Attending: PHYSICAL MEDICINE & REHABILITATION
Payer: MEDICARE

## 2025-06-19 VITALS
WEIGHT: 142 LBS | OXYGEN SATURATION: 95 % | BODY MASS INDEX: 19.88 KG/M2 | DIASTOLIC BLOOD PRESSURE: 77 MMHG | HEART RATE: 89 BPM | HEIGHT: 71 IN | SYSTOLIC BLOOD PRESSURE: 140 MMHG

## 2025-06-19 DIAGNOSIS — M16.11 PRIMARY OSTEOARTHRITIS OF RIGHT HIP: ICD-10-CM

## 2025-06-19 RX ORDER — TRIAMCINOLONE ACETONIDE 40 MG/ML
40 INJECTION, SUSPENSION INTRA-ARTICULAR; INTRAMUSCULAR ONCE
Status: COMPLETED | OUTPATIENT
Start: 2025-06-19 | End: 2025-06-19

## 2025-06-19 RX ORDER — LIDOCAINE HYDROCHLORIDE 10 MG/ML
5 INJECTION, SOLUTION EPIDURAL; INFILTRATION; INTRACAUDAL; PERINEURAL ONCE
Status: COMPLETED | OUTPATIENT
Start: 2025-06-19 | End: 2025-06-19

## 2025-06-19 RX ORDER — ROPIVACAINE HYDROCHLORIDE 2 MG/ML
4 INJECTION, SOLUTION EPIDURAL; INFILTRATION; PERINEURAL ONCE
Status: COMPLETED | OUTPATIENT
Start: 2025-06-19 | End: 2025-06-19

## 2025-06-19 RX ADMIN — LIDOCAINE HYDROCHLORIDE 5 ML: 10 INJECTION, SOLUTION EPIDURAL; INFILTRATION; INTRACAUDAL; PERINEURAL at 14:42

## 2025-06-19 RX ADMIN — ROPIVACAINE HYDROCHLORIDE 4 ML: 2 INJECTION, SOLUTION EPIDURAL; INFILTRATION; PERINEURAL at 14:42

## 2025-06-19 RX ADMIN — TRIAMCINOLONE ACETONIDE 40 MG: 40 INJECTION, SUSPENSION INTRA-ARTICULAR; INTRAMUSCULAR at 14:42

## 2025-06-19 ASSESSMENT — PAIN SCALES - GENERAL: PAINLEVEL_OUTOF10: MODERATE PAIN (4)

## 2025-06-19 NOTE — LETTER
"6/19/2025      Tricia Sosa  [address] [facility] [address]      Dear Colleague,    Thank you for referring your patient, Tricia Sosa, to the [facility]. Please see a copy of my visit note below.    PHYSICAL MEDICINE & REHABILITATION / MEDICAL SPINE      PROCEDURE DATE:  Jun 19, 2025      PATIENT NAME:  Tricia Sosa  MRN:  [id number]  YOB: 1940      PRE-PROCEDURE DIAGNOSIS:  1. Primary osteoarthritis of right hip        POST-PROCEDURE DIAGNOSIS:  1. Primary osteoarthritis of right hip        PROCEDURE:  Ultrasound-guided right hip intra-articular injection.  (CPT Code:  77706)      PROCEDURE IN DETAIL:  Prior to the procedure, educational material was provided for the patient to review and take home.  After a discussion of the risks, benefits, and alternatives to the procedure, the patient expressed understanding and wished to proceed.  Consent form was signed.  The patient was brought to the ultrasound suite and placed in the supine position.  Procedural pause was conducted to verify:  patient identity, procedure to be performed, laterality, site, and patient position.    The affected hip was scanned using a linear ultrasound probe to visualize the right acetabulum, femoral head, and femoral head and neck junction and determine the optimal needle path.  The anterior synovial recess at the junction of the femoral head and neck was identified, and the ideal injection site was marked.  The skin was then cleaned and prepped using chlorhexidine.  A sterile transducer cover was then placed on the transducer.    The injection site was then scanned again using the sterile transducer probe.  The anterior synovial recess at the junction of the right femoral head and neck was identified under ultrasound.  The skin and subcutaneous tissues were anesthetized using 25g 2\" needle with 5 ml 1% preservative-free lidocaine.  Next, 22g 2.5\" needle was directed using " ultrasound guidance for an in-plane approach into the anterior synovial recess at the junction of the femoral head and neck.  The needle was then aspirated.  After negative aspiration, 40 mg triamcinolone and 4 ml 0.2% ropivacaine were injected into the anterior synovial recess at the junction of the femoral head and neck.  Images of proper needle position and injectate solution were saved.  The needle was then removed.     The skin was wiped clean, and bandages were placed as appropriate.  There were no apparent complications.  The patient tolerated the procedure well.  The patient was observed for an appropriate period of time and discharged in excellent condition.    Preprocedure pain level: 0/10.  Postprocedure pain level: 0/10.      Gurjit Reza MD      Again, thank you for allowing me to participate in the care of your patient.        Sincerely,        Gurjit Reza MD    Electronically signed

## 2025-06-19 NOTE — PROGRESS NOTES
"PHYSICAL MEDICINE & REHABILITATION / MEDICAL SPINE      PROCEDURE DATE:  Jun 19, 2025      PATIENT NAME:  Tricia Sosa  MRN:  [id number]  YOB: 1940      PRE-PROCEDURE DIAGNOSIS:  1. Primary osteoarthritis of right hip        POST-PROCEDURE DIAGNOSIS:  1. Primary osteoarthritis of right hip        PROCEDURE:  Ultrasound-guided right hip intra-articular injection.  (CPT Code:  34173)      PROCEDURE IN DETAIL:  Prior to the procedure, educational material was provided for the patient to review and take home.  After a discussion of the risks, benefits, and alternatives to the procedure, the patient expressed understanding and wished to proceed.  Consent form was signed.  The patient was brought to the ultrasound suite and placed in the supine position.  Procedural pause was conducted to verify:  patient identity, procedure to be performed, laterality, site, and patient position.    The affected hip was scanned using a linear ultrasound probe to visualize the right acetabulum, femoral head, and femoral head and neck junction and determine the optimal needle path.  The anterior synovial recess at the junction of the femoral head and neck was identified, and the ideal injection site was marked.  The skin was then cleaned and prepped using chlorhexidine.  A sterile transducer cover was then placed on the transducer.    The injection site was then scanned again using the sterile transducer probe.  The anterior synovial recess at the junction of the right femoral head and neck was identified under ultrasound.  The skin and subcutaneous tissues were anesthetized using 25g 2\" needle with 5 ml 1% preservative-free lidocaine.  Next, 22g 2.5\" needle was directed using ultrasound guidance for an in-plane approach into the anterior synovial recess at the junction of the femoral head and neck.  The needle was then aspirated.  After negative aspiration, 40 mg triamcinolone and 4 ml 0.2% ropivacaine were injected " into the anterior synovial recess at the junction of the femoral head and neck.  Images of proper needle position and injectate solution were saved.  The needle was then removed.     The skin was wiped clean, and bandages were placed as appropriate.  There were no apparent complications.  The patient tolerated the procedure well.  The patient was observed for an appropriate period of time and discharged in excellent condition.    Preprocedure pain level: 0/10.  Postprocedure pain level: 0/10.      Gurjit Reza MD

## 2025-06-21 ENCOUNTER — HEALTH MAINTENANCE LETTER (OUTPATIENT)
Age: 85
End: 2025-06-21

## 2025-06-24 ENCOUNTER — THERAPY VISIT (OUTPATIENT)
Dept: PHYSICAL THERAPY | Facility: CLINIC | Age: 85
End: 2025-06-24
Payer: MEDICARE

## 2025-06-24 DIAGNOSIS — M17.0 OSTEOARTHRITIS OF PATELLOFEMORAL JOINTS OF BOTH KNEES: Primary | ICD-10-CM

## 2025-06-24 PROCEDURE — 97110 THERAPEUTIC EXERCISES: CPT | Mod: GP | Performed by: PHYSICAL THERAPIST

## 2025-07-01 ENCOUNTER — THERAPY VISIT (OUTPATIENT)
Dept: PHYSICAL THERAPY | Facility: CLINIC | Age: 85
End: 2025-07-01
Payer: MEDICARE

## 2025-07-01 DIAGNOSIS — M17.0 OSTEOARTHRITIS OF PATELLOFEMORAL JOINTS OF BOTH KNEES: Primary | ICD-10-CM

## 2025-07-01 PROCEDURE — 97110 THERAPEUTIC EXERCISES: CPT | Mod: GP | Performed by: PHYSICAL THERAPIST

## 2025-07-02 ENCOUNTER — NURSE TRIAGE (OUTPATIENT)
Dept: INTERNAL MEDICINE | Facility: CLINIC | Age: 85
End: 2025-07-02
Payer: MEDICARE

## 2025-07-02 NOTE — TELEPHONE ENCOUNTER
"SECOND LEVEL TRIAGE-YES    S-(situation): right jaw pain-intermittent sharp shooting pains     B-(background): no cardiac history    A-(assessment): has been having intermittent jaw pain since May.  Stated jaw pain and clicking happen with eating and yawning.  No pain currently.  Denied chest pain, difficulty breathing, fever, toothache, nasal discharge, nasal congestion.  Stated there has been intermittent swelling in jaw/neck area on right side.  No swelling currently.     R-(recommendations): per protocol, should go to ER due to jaw pain and risk factors.          Reason for Disposition   New-onset jaw pain of unknown cause AND at least one cardiac risk factor (e.g., 45 years or older, diabetes, high cholesterol, hypertension, obesity, smoker or strong family history of heart disease)    Additional Information   Negative: Shock suspected (e.g., cold/pale/clammy skin, too weak to stand, low BP, rapid pulse)   Negative: Similar pain previously and it was from 'heart attack'   Negative: Similar pain previously and it was from 'angina' and not relieved by nitroglycerin   Negative: Sounds like a life-threatening emergency to the triager   Negative: Chest Pain   Negative: Sinus pain and congestion   Negative: Headache or pain in upper forehead   Negative: Toothache is main symptom   Negative: Followed a face injury   Negative: Difficulty breathing or unusual sweating (e.g., sweating without exertion)   Negative: Can't close the mouth fully (i.e., mouth is 'locked' open; patient will have difficulty talking)   Negative: Fever and localized red rash   Negative: Fever and area of face is swollen    Answer Assessment - Initial Assessment Questions  1. ONSET: \"When did the pain start?\" (e.g., minutes, hours, days)      Late May-intermittent sharp pains  2. ONSET: \"Does the pain come and go, or has it been constant since it started?\" (e.g., constant, intermittent, fleeting)      intermittent  3. SEVERITY: \"How bad is the " "pain?\" (Scale 1-10; mild, moderate or severe)      No pain currently  4. LOCATION: \"Where does it hurt?\"       Right jaw-intermittent  5. RASH: \"Is there any redness, rash, or swelling of your face?\"      Intermittent swelling in right neck/jaw area  6. FEVER: \"Do you have a fever?\" If Yes, ask: \"What is it, how was it measured, and when did it start?\"       no  7. OTHER SYMPTOMS: \"Do you have any other symptoms?\" (e.g., fever, toothache, nasal discharge, nasal congestion, clicking sensation in jaw joint)      Clicking in jaw joint  8. PREGNANCY: \"Is there any chance you are pregnant?\" \"When was your last menstrual period?\"      no    Protocols used: Face Pain-A-[place]    "

## 2025-07-02 NOTE — TELEPHONE ENCOUNTER
Call to patient to relay Dr. Childs's message. Patient states her pain is not nearly as bad and okay now. Pain seems to be present only when mouth is in use such as eating. Talking does not cause her pain. Patient will plan to monitor for now, but agreeable to urgent care or emergency room if pain persists/worsens. Patient advised to go to ED for any symptoms of shortness of breath, chest pain, arm pain.     Thank you,  Luis, Triage RN Lobo Deng    11:21 AM 7/2/2025

## 2025-07-07 NOTE — LETTER
1/18/2022         RE: Tricia Sosa  [address]  [facility] [address]        Dear Colleague,    Thank you for referring your patient, Tricia Sosa, to the [facility]. Please see a copy of my visit note below.    Oncology Rooming Note    January 18, 2022 9:16 AM   Tricia Sosa is a 81 year old female who presents for:    Chief Complaint   Patient presents with     Oncology Clinic Visit     Autoimmune hemolytic anemia      Initial Vitals: BP (!) 157/67   Pulse 72   Temp 97.6  F (36.4  C) (Tympanic)   Resp 16   Wt 72.1 kg (159 lb)   LMP  (LMP Unknown)   SpO2 97%   BMI 21.56 kg/m   Estimated body mass index is 21.56 kg/m  as calculated from the following:    Height as of 1/3/22: 1.829 m (6').    Weight as of this encounter: 72.1 kg (159 lb). Body surface area is 1.91 meters squared.  No Pain (0) Comment: Data Unavailable   No LMP recorded (lmp unknown). Patient is postmenopausal.  Allergies reviewed: Yes  Medications reviewed: Yes    Medications: Medication refills not needed today.  Pharmacy name entered into "[employer]:    [facility] [place], [place] - [address] [facility] [id number] [place] [address] [phone] [facility]    Hematology/Oncology Established Patient Follow-up Note      Today's Date: 1/18/22    Reason for Follow-up: Hemolytic anemia-autoimmune; IgA deficiency    HISTORY OF PRESENT ILLNESS: Tricia Sosa is a 81 year old female who presents with autoimmune hemolytic anemia and IgA deficiency.  She previously saw Dr. Matos and then Dr. Summers.  She was previously seen at [facility].    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  She was very sick at the time and had severe diarrhea.  She lost about 40 pounds in weight.  The workup revealed that she had markedly diminished CD4 counts of 48 and was diagnosed  Patient calling for directions to her appointment today.  Warm transferred to the  for further assistance.   with CMV colitis.  Since then she has been followed in hematology clinic at [place] until recently.  She was noted to have anemia which was fairly long-standing.  She was initially referred for anemia in 2004 and also had a bone marrow biopsy done at that time which revealed hypercellular bone marrow with 70-80% cellularity and normal trilineage hematopoiesis and no morphologic features of MDS or lymphoproliferation.  Her anemia was attributed to her chronic underlying disease.  At the next evaluation in 2002 on 4 aggressive anemia there was no evidence of hemolysis, iron deficiency, B12 or folate deficiency.  Bone marrow biopsy was not pursued.  In summer of 2015 she had progressive fatigue, dyspnea on exertion and worsening anemia with a hemoglobin now of 9.  Workup at this time did suggest a hemolytic anemia with elevated LDH at 709, undetectable haptoglobin and elevated unconjugated bilirubin at 3.3.  Monospecific MARIKA was positive for both IgG and complement.  Cold agglutinin screen was positive with very low titer 1:64.  She was started on prednisone 60 mg daily with supplementation of iron folate and B12 starting 7/31/15.  Her prednisone has been slowly tapered and was discontinued off in January 2016.  She was last followed at [facility] on March 10, 2016 when she had stable hemoglobin.    She was followed by Dr. Matos and Dr. Summers.  Due to relapse of hemolytic anemia, she underwent another course of prednisone that has since been tapered off and rituximab x 4 weekly doses completed in 9/19/19.    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  She started weekly Rituxan on 7/31/20 x 4 doses, completed on 8/21/20.  She tapered off of prednisone in October 2020.    Due to relapse of her AIHA, she started prednisone again on 6/8/21 at 60 mg daily with slow taper and finished taper in August 2021.    Due to relapse of AIHA again, she started prednisone again  "on 11/2/21 at 70 mg daily with slow taper.  She also completed weekly Rituxuan again x 4 doses 12/6/21-12/29/21.      INTERIM HISTORY: Tricia is doing okay, \"about the same.\"  She finished Rituxan.  She says that after the first infusion, she felt dizzy and tired afterwards.  She is now down to prednisone 20 mg daily.      REVIEW OF SYSTEMS:   14 point ROS was reviewed and is negative other than as noted above in HPI.       HOME MEDICATIONS:  Current Outpatient Medications   Medication Sig Dispense Refill     cyanocobalamin (VITAMIN  B-12) 1000 MCG tablet   3     Flaxseed, Linseed, (FLAX SEEDS PO) Take by mouth daily       folic acid (FOLVITE) 1 MG tablet   3     ketoconazole (NIZORAL) 2 % external shampoo Use every other day to scalp as needed 120 mL 11     levothyroxine (SYNTHROID/LEVOTHROID) 150 MCG tablet Take 1 tablet (150 mcg) by mouth daily 90 tablet 3     predniSONE (DELTASONE) 10 MG tablet Take 3 tablets (30 mg) by mouth daily or per MD taper directions (Patient taking differently: 20 mg Take 3 tablets (30 mg) by mouth daily or per MD taper directions) 100 tablet 0     sulfamethoxazole-trimethoprim (BACTRIM DS) 800-160 MG tablet Take 1 pill orally on Mon, Wed and Friday 45 tablet 3     UNABLE TO FIND privigen inj every two months       hydrochlorothiazide (HYDRODIURIL) 12.5 MG tablet Take 1 tablet (12.5 mg) by mouth daily as needed (edema) (Patient not taking: Reported on 12/29/2021) 30 tablet 5     hydrocortisone butyrate (LOCOID) 0.1 % SOLN solution Apply to scalp bid prn (Patient not taking: Reported on 12/21/2021) 60 mL 11     triamcinolone (KENALOG) 0.1 % external cream  (Patient not taking: Reported on 12/21/2021)           ALLERGIES:  Allergies   Allergen Reactions     Doxycycline          PAST MEDICAL HISTORY:  Past Medical History:   Diagnosis Date     WARD (dyspnea on exertion)      External hemorrhoids without mention of complication 6/27/05     Hemolytic anemia (H)     autoimmune     " Hypothyroidism      IgA deficiency (H)      Myopia of both eyes with astigmatism and presbyopia 8/16/2017     Other malaise and fatigue      Other severe protein-calorie malnutrition      Other vitreous opacities 12/19/2006     Thyroid disease      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    Hemorrhage Subdural Trauma Without LOC Active     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, and posterior vitreous detachment     Urinary tract infection, site not specified     Recurrent UTI's         PAST SURGICAL HISTORY:  Past Surgical History:   Procedure Laterality Date     COLONOSCOPY N/A 4/26/2016    Procedure: COLONOSCOPY;  Surgeon: Dontae Del Rio MD;  Location:  GI     ENT SURGERY  1985    Thyroid lobectomy     EYE SURGERY Bilateral 04/20/2021    Cataract Surgery     HC CYSTOSCOPY,INSERT URETERAL STENT      Ureteral stent insertion     HC FLEX SIGMOIDOSCOPY W BIOPSY  5/30/00     HC LAPAROSCOPY, SURGICAL; CHOLECYSTECTOMY  1992      THYROIDECTOMY       LIGATION OF HEMORRHOID(S)      Hemorrhoidectomy     UPPER GI ENDOSCOPY,BIOPSY  10/01/01     ZZC LIGATE FALLOPIAN TUBE       ZZHC COLONOSCOPY W BIOPSY  4/26/00     ZZHC COLONOSCOPY W BIOPSY  10/8/03     ZZHC COLONOSCOPY W BIOPSY  6/27/05    REPEAT IN 5 YEARS         SOCIAL HISTORY:  Social History     Socioeconomic History     Marital status:      Spouse name: Not on file     Number of children: Not on file     Years of education: Not on file     Highest education level: Not on file   Occupational History     Not on file   Tobacco Use     Smoking status: Never Smoker     Smokeless tobacco: Never Used   Substance and Sexual Activity     Alcohol use: Yes     Alcohol/week: 0.0 standard drinks     Comment: 1 a day at most     Drug use: No     Sexual activity: Not Currently     Partners: Male   Other Topics Concern     Parent/sibling w/ CABG, MI or angioplasty before 65F 55M? No   Social History Narrative     Not on  file     Social Determinants of Health     Financial Resource Strain: Not on file   Food Insecurity: Not on file   Transportation Needs: Not on file   Physical Activity: Not on file   Stress: Not on file   Social Connections: Not on file   Intimate Partner Violence: Not At Risk     Fear of Current or Ex-Partner: No     Emotionally Abused: No     Physically Abused: No     Sexually Abused: No   Housing Stability: Not on file         FAMILY HISTORY:  Family History   Problem Relation Age of Onset     Colon Cancer Mother 90     Cerebrovascular Disease Father          at age 85     Dementia Brother      Prostate Cancer Brother      Thyroid Disease Brother      Dementia Brother      Thyroid Disease Brother      Pancreatic Cancer Brother      Breast Cancer Sister      Hyperlipidemia Sister      Depression Sister      Anxiety Disorder Sister      Thyroid Disease Sister      Breast Cancer Sister      Colon Cancer Niece      Other Cancer Niece         leukemia         PHYSICAL EXAM:  BP (!) 157/67   Pulse 72   Temp 97.6  F (36.4  C) (Tympanic)   Resp 16   Wt 72.1 kg (159 lb)   LMP  (LMP Unknown)   SpO2 97%   BMI 21.56 kg/m    ECO  GENERAL/CONSTITUTIONAL: No acute distress.  EYES: No scleral icterus.  NEUROLOGIC: Alert, oriented, answers questions appropriately.  INTEGUMENTARY: No jaundice.        LABS:  CBC RESULTS: Recent Labs   Lab Test 22  1039   WBC 5.5   RBC 4.13   HGB 10.3*   HCT 34.3*   MCV 83   MCH 24.9*   MCHC 30.0*   RDW 14.2        Recent Labs   Lab Test 22  1039 22  1037    138   POTASSIUM 3.6 4.1   CHLORIDE 108 107   CO2 25 26   ANIONGAP 6 5   GLC 93 105*   BUN 16 13   CR 0.63 0.72   CULLEN 8.8 8.5     Lab Results   Component Value Date    AST 18 2022    AST 30 2021     Lab Results   Component Value Date    ALT 26 2022    ALT 36 2021     No results found for: BILICONJ   Lab Results   Component Value Date    BILITOTAL 0.8 2022    BILITOTAL  0.8 06/29/2021     Lab Results   Component Value Date    ALBUMIN 3.9 01/14/2022    ALBUMIN 3.9 06/29/2021     Lab Results   Component Value Date    PROTTOTAL 6.4 01/14/2022    PROTTOTAL 6.4 06/29/2021      Lab Results   Component Value Date    ALKPHOS 89 01/14/2022    ALKPHOS 97 06/29/2021     Component      Latest Ref Rng & Units 1/14/2022   IGG      610-1,616 mg/dL 462 (L)   IGA      84 - 499 mg/dL <2 (L)   IGM      35 - 242 mg/dL <10 (L)   % Retic      0.5 - 2.0 % 1.9   Absolute Retic      0.025 - 0.095 10e6/uL 0.079   Haptoglobin      32 - 197 mg/dL <3 (L)   Lactate Dehydrogenase      81 - 234 U/L 214         ASSESSMENT/PLAN:  Tricia Sosa is a 81 year old female with:      1. 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 Common variable immunodeficiency disorder  4. Splenomegaly        1) Autoimmune hemolytic anemia: She had relapse in July 2020, and started on prednisone, as well as weekly rituximab infusions x 4, completed on 8/21/20.  Her hemoglobin slowly improved back up to her baseline and has been stable.  She tapered off of prednisone as of early October 2020.  She had another relapse, and stated on prednisone again on 6/8/21.  She has had good response in hemoglobin back to her baseline with prednisone alone, and has finished taper in August 2021.    We also previously talked about other treatment options if her disease becomes refractory to steroids, such as splenectomy.      Her hemoglobin dropped again, and she restarted prednisone at 70 mg daily on 11/2/21.   Her hemoglobin has improved back up to 10.3.      She does not want to consider splenectomy yet.  She does still seem to be responding to steroids.  However, she relapsed again more quickly this time.  She is willing to take rituximab again to see if she can go longer between recurrent episodes, which she completed another 4 doses on  12/29/21.    -she is now on prednisone 20 mg daily, she will decrease by 5 mg every other week until off.  -hemoglobin now stable  -monitor CBC every other week for now  -she is on Bactrim for prophylaxis when she is on prednisone  -RTC with me with her next IVIG infusion; due to transportation issues, she would like to minimize number of visits if possible    2) CVID:   -continue IVIG.  She gets it, if IgG is <600.  It's been about every other month.  She will get a dose today.  -IgG check and IVIG every 8 weeks if meets parameters  -she says that she had a infectious disease physician when she was at [facility] and would like a referral here.  Referral was made, but she still has not heard from them regarding appointment.  -she did see immunology, but had to go to [place], and does not want to go there      Sabina Boyd MD  Hematology/Oncology  [facility]        Total time spent on day of visit, including review of tests, obtaining/reviewing separately obtained history, ordering medications/tests/procedures, communicating with PCP/consultants, and documenting in electronic medical record: 30 minutes        Again, thank you for allowing me to participate in the care of your patient.        Sincerely,        Sabina Boyd MD

## 2025-07-17 ENCOUNTER — THERAPY VISIT (OUTPATIENT)
Dept: PHYSICAL THERAPY | Facility: CLINIC | Age: 85
End: 2025-07-17
Payer: MEDICARE

## 2025-07-17 DIAGNOSIS — M17.0 OSTEOARTHRITIS OF PATELLOFEMORAL JOINTS OF BOTH KNEES: Primary | ICD-10-CM

## 2025-07-17 PROCEDURE — 97110 THERAPEUTIC EXERCISES: CPT | Mod: GP | Performed by: PHYSICAL THERAPIST

## 2025-07-21 ENCOUNTER — TELEPHONE (OUTPATIENT)
Dept: INTERNAL MEDICINE | Facility: CLINIC | Age: 85
End: 2025-07-21
Payer: MEDICARE

## 2025-07-21 NOTE — TELEPHONE ENCOUNTER
Order/Referral Request    Who is requesting: PT    Orders being requested: REFERRAL FOR JAW    Reason service is needed/diagnosis: JAW PAIN - SWELLING, CLICKS WHEN CHEWING     When are orders needed by: ASAP    Has this been discussed with Provider: No    Does patient have a preference on a Group/Provider/Facility? [facility]    Does patient have an appointment scheduled?: No    Where to send orders: Place orders within Epic    Could we send this information to you in [facility] or would you prefer to receive a phone call?:   Patient would prefer a phone call   Okay to leave a detailed message?: Yes at Home number on file [phone] (home)

## 2025-07-22 NOTE — TELEPHONE ENCOUNTER
I am sorry to hear she does not have dental insurance but this is likely something her dentist can help with and I would strongly recommend she contact a dental office. I think ENT would defer to oral surgery or dentistry

## 2025-07-22 NOTE — PROGRESS NOTES
ASSESSMENT & PLAN  Patient Instructions     1. Pain of both shoulder joints    2. Impingement syndrome of shoulder, right    3. Impingement syndrome of left shoulder    4. Osteoarthritis of patellofemoral joints of both knees      - Patient is following up for chronic bilateral shoulder pain due to impingement, and bilateral knee pain due to osteoarthritis  -Patient was seen for her right hip pain by Dr. Reza and had a right hip intra-articular cortisone injection for hip osteoarthritis  -Patient states that her pain has improved since her last visit.  She has some milder intermittent self-limiting pain which is tolerable at this time  -Patient has stopped taking her gabapentin medicine and switched over to Cymbalta 30 mg daily.  Patient states she continues to have daily fatigue.  We discussed continuing the Cymbalta medication versus discontinuing medication.  Patient states that her fatigue began when she started the gabapentin medication which she has discontinued for couple of months without improvement.  We decided to stop taking the Cymbalta medication and once it is finishes later this week to see if her fatigue improves.  -If her fatigue does not improve, patient should follow-up with her primary care physician for further workup.  Patient should also contact her oncologist to determine if her immunodeficiency disorder is contributing to her fatigue  -Will continue to perform localized cortisone injections periodically as needed to control her pain regulating daily pain medication  -Patient will continue to follow-up with me for further treatment and recommendations.  -Call direct clinic number [[phone]] at any time with questions or concerns.    Albert Yeo MD [facility] and [facility] Medicine  [facility]        -----    SUBJECTIVE:  Tricia Sosa is a 84 year old female who is seen in follow-up for bilateral shoulder pain and medication check.They were last seen  5/20/2025 at that time the plan was continue Follow-up with Dr. Reza for right leg pain, refill for gabapentin, start cymbalta. Patient Dax messaged in on 6/25/25 with update after her Dr. Reza appointment on 6/19/25 due to drowsiness she discontinued gabapentin and continue duloxetine. At that time it was recommended to Follow-up in 3-4 weeks for medication review.     Since their last visit reports improvement in the left shoulder and bilateral knees. They indicate that their current pain level is 4/10. They have tried diclofenac gel, duloxetine, gabapentin, left subacromial bursa cortisone injection on 5/20/25.  Her pain is located over the AC joint and will radiate down on bilateral shoulders.    The patient is seen by themselves.    Patient's past medical, surgical, social, and family histories were reviewed today and no changes are noted.    REVIEW OF SYSTEMS:  Constitutional: NEGATIVE for fever, chills, change in weight  Skin: NEGATIVE for worrisome rashes, moles or lesions  GI/: NEGATIVE for bowel or bladder changes  Neuro: NEGATIVE for weakness, dizziness or paresthesias    OBJECTIVE:  LMP  (LMP Unknown)    General: healthy, alert and in no distress  HEENT: no scleral icterus or conjunctival erythema  Skin: no suspicious lesions or rash. No jaundice.  CV: regular rhythm by palpation, no pedal edema  Resp: normal respiratory effort without conversational dyspnea   Psych: normal mood and affect  Gait: normal steady gait with appropriate coordination and balance  Neuro: normal light touch sensory exam of the extremities.    MSK:  BILATERAL SHOULDER  Inspection:    no swelling, bruising, discoloration, or obvious deformity or asymmetry  Palpation:  bony and tendinous landmarks are nontender.  Active Range of Motion:     Abduction 1650, FF 1800, , IR L4.    Strength:    Scapular plane abduction weakness,  ER weakness, IR weakness, biceps not tested, triceps not tested  Special Tests:    Positive:  Right Neer's, Mccormick', and supraspinatus (empty can)    Negative: drop arm/painful arc, crossed arm adduction, [place]'s, Speed's, and Yergatai's         Independent visualization of the below image:        Large Joint Injection/Arthocentesis: R subacromial bursa    Date/Time: 7/29/2025 1:32 PM    Performed by: Yeo, Albert, MD  Authorized by: Yeo, Albert, MD    Indications:  Pain  Needle Size:  25 G  Guidance: landmark guided    Approach:  Posterior  Location:  Shoulder      Site:  R subacromial bursa  Medications:  40 mg methylPREDNISolone 40 MG/ML; 4 mL lidocaine (PF) 1 %  Outcome:  Tolerated well, no immediate complications  Procedure discussed: discussed risks, benefits, and alternatives    Consent Given by:  Patient  Timeout: timeout called immediately prior to procedure    Prep: patient was prepped and draped in usual sterile fashion      Patient's conditions were thoroughly discussed during today's visit with total time spent face-to-face with the patient and documentation being 20 minutes excluding time to perform the procedure.    Albert Yeo MD, [facility]

## 2025-07-22 NOTE — TELEPHONE ENCOUNTER
Huge lump beneath right ear in early June for 1 day, it was about 3 inches long.  She does continue to have some pain in that area if she bites down hard. Painful to open her mouth wide at jaw.      Patient does not have any dental insurance so was hoping to go a medical route rather than a surgical route for her symptoms. CT Blancas R.N.

## 2025-07-22 NOTE — PROGRESS NOTES
DISCHARGE  Reason for Discharge: No further expectation of progress with ortho PT at this time. Patient is independent with her HEP and can continue to complete at home. She has not demonstrated significant strength improvement that translate to meeting her functional goals. She is to continue HEP independent at home and continue to keep in contact with the medical team about and s/s. See flowsheet below for further details.  Discharge Plan: Patient to continue home program.    Referring Provider:  Andrew Pérez       07/17/25 0500   Appointment Info   Signing clinician's name / credentials Mahnaz Churchill PT, DPT   Total/Authorized Visits ET   Visits Used 7   Medical Diagnosis OA of edgar patellafemoral joints of edgar knees   PT Tx Diagnosis Edgar Chronic knee pain   Precautions/Limitations Immunosuppressed, fall risk   Other pertinent information Hip injection in Mid June planned with Dr. Reza, has already had edgar knee injections.   Progress Note/Certification   Start of Care Date 05/08/25   Onset of illness/injury or Date of Surgery 05/08/25   Therapy Frequency 1 x week   Predicted Duration 10 weeks   Certification date from 05/08/25   Certification date to 07/17/25   Progress Note Due Date 07/17/25   Progress Note Completed Date 05/08/25   GOALS   PT Goals 2   PT Goal 1   Goal Identifier Ambulation- NOT MET   Goal Description Patient will be able to walk unrestricted distances without device pain free with normalized gati pattern   Rationale to maximize safety and independence with performance of ADLs and functional tasks;to maximize safety and independence within the home;to maximize safety and independence within the community;to maximize safety and independence with transportation;to maximize safety and independence with self cares   Goal Progress Ambulating short distances in the kitchen without AD, use of walkers on various levels in the house. Has been walking short distance for exercise with walker.    Target Date 07/17/25   PT Goal 2   Goal Identifier Squatting- NOT MET   Goal Description Patient will be able to perform a full depth squat pain free without deviation pain free   Rationale to maximize safety and independence within the community;to maximize safety and independence with transportation;to maximize safety and independence within the home;to maximize safety and independence with performance of ADLs and functional tasks;to maximize safety and independence with self cares   Goal Progress Still very difficult to stand from standard height surfaces- needs surface raised and UE support in order to rise independenly   Target Date 07/17/25   Subjective Report   Subjective Report PAtient notes that she has been completing her exercises when she does not have another appointment. IS in agreement with therapist that she has made minimal progress. She feels that no matter how hard she is working she continues to feel like she is declining. Therapist and patient in agreement that she should bring this up with her medical team at upcoming appointments.   Therapeutic Procedure/Exercise   Therapeutic Procedures: strength, endurance, ROM, flexibility minutes (51681) 25   Ther Proc 2 sit to stand   Ther Proc 2 - Details unable to complete even from plinth raised >30 inches   PTRx Ther Proc 1 Seated Hip Flexion   PTRx Ther Proc 1 - Details x 20B   PTRx Ther Proc 2 Bridging #1   PTRx Ther Proc 2 - Details x 15 with brief holds    PTRx Ther Proc 3 Standing Hip Abduction   PTRx Ther Proc 3 - Details x 20B   PTRx Ther Proc 4 Rowing with Shoulders Up and Back   PTRx Ther Proc 4 - Details Reviewed verbally   PTRx Ther Proc 5 Sit to Stand   PTRx Ther Proc 5 - Details too difficult today to do this   PTRx Ther Proc 6 Short Arc Knee Extension   PTRx Ther Proc 6 - Details x 20B   PTRx Ther Proc 7 Clamshell with Theraband   PTRx Ther Proc 7 - Details x 20 with GTB today    PTRx Ther Proc 8 Hip Flexion Straight Leg Raise   PTRx  Ther Proc 8 - Details x 10B then x 5B appears to have better endurance than prior attempts at this   PTRx Ther Proc 9 Marching in Place- standing at the countertop for support   PTRx Ther Proc 9 - Details x20 or for 1 minute at a time   Skilled Intervention ecucation, demosntration, answering questions   Patient Response/Progress patient able to compelte exezricses with breaks between, but continues to demosntrate signficant proximal functional musuclar weakness   Education   Learner/Method Patient;Demonstration;Pictures/Video   Education Comments d/c for independent progression   Plan   Home program see PTRx- patient has printouts of routine   Updates to plan of care d/c- discuss progressive weakness with medical team   Total Session Time   Timed Code Treatment Minutes 25   Total Treatment Time (sum of timed and untimed services) 25

## 2025-07-22 NOTE — TELEPHONE ENCOUNTER
Is she asking for referral to oral surgery? Would recommend a nurse call patient to obtain more information. She also could talk to her dentist about this first as they typically can refer to oral surgeons

## 2025-07-24 NOTE — TELEPHONE ENCOUNTER
Actually- we may be able to refer to a head/neck pain clinic. Why don't you schedule her an appt with me so we can figure out who exactly she needs to be seen by. Thanks! I have two ZENAIDA slots available tomorrow

## 2025-07-24 NOTE — TELEPHONE ENCOUNTER
Call received from patient. Advised. States the swelling resolved but she still has some discomfort. She will call back to schedule.

## 2025-07-26 SDOH — HEALTH STABILITY: PHYSICAL HEALTH: ON AVERAGE, HOW MANY DAYS PER WEEK DO YOU ENGAGE IN MODERATE TO STRENUOUS EXERCISE (LIKE A BRISK WALK)?: 6 DAYS

## 2025-07-26 SDOH — HEALTH STABILITY: PHYSICAL HEALTH: ON AVERAGE, HOW MANY MINUTES DO YOU ENGAGE IN EXERCISE AT THIS LEVEL?: 20 MIN

## 2025-07-29 ENCOUNTER — OFFICE VISIT (OUTPATIENT)
Dept: ORTHOPEDICS | Facility: CLINIC | Age: 85
End: 2025-07-29
Payer: MEDICARE

## 2025-07-29 DIAGNOSIS — M25.511 PAIN OF BOTH SHOULDER JOINTS: Primary | ICD-10-CM

## 2025-07-29 DIAGNOSIS — M75.41 IMPINGEMENT SYNDROME OF SHOULDER, RIGHT: ICD-10-CM

## 2025-07-29 DIAGNOSIS — M17.0 OSTEOARTHRITIS OF PATELLOFEMORAL JOINTS OF BOTH KNEES: ICD-10-CM

## 2025-07-29 DIAGNOSIS — M25.512 PAIN OF BOTH SHOULDER JOINTS: Primary | ICD-10-CM

## 2025-07-29 DIAGNOSIS — M75.42 IMPINGEMENT SYNDROME OF LEFT SHOULDER: ICD-10-CM

## 2025-07-29 PROCEDURE — 99213 OFFICE O/P EST LOW 20 MIN: CPT | Mod: 25 | Performed by: FAMILY MEDICINE

## 2025-07-29 PROCEDURE — 20610 DRAIN/INJ JOINT/BURSA W/O US: CPT | Mod: RT | Performed by: FAMILY MEDICINE

## 2025-07-29 RX ORDER — METHYLPREDNISOLONE ACETATE 40 MG/ML
40 INJECTION, SUSPENSION INTRA-ARTICULAR; INTRALESIONAL; INTRAMUSCULAR; SOFT TISSUE
Status: COMPLETED | OUTPATIENT
Start: 2025-07-29 | End: 2025-07-29

## 2025-07-29 RX ORDER — LIDOCAINE HYDROCHLORIDE 10 MG/ML
4 INJECTION, SOLUTION EPIDURAL; INFILTRATION; INTRACAUDAL; PERINEURAL
Status: COMPLETED | OUTPATIENT
Start: 2025-07-29 | End: 2025-07-29

## 2025-07-29 RX ADMIN — LIDOCAINE HYDROCHLORIDE 4 ML: 10 INJECTION, SOLUTION EPIDURAL; INFILTRATION; INTRACAUDAL; PERINEURAL at 13:32

## 2025-07-29 RX ADMIN — METHYLPREDNISOLONE ACETATE 40 MG: 40 INJECTION, SUSPENSION INTRA-ARTICULAR; INTRALESIONAL; INTRAMUSCULAR; SOFT TISSUE at 13:32

## 2025-07-29 NOTE — LETTER
7/29/2025      Tricia Sosa  [address]  [place] [address]      Dear Colleague,    Thank you for referring your patient, Tricia Sosa, to the [facility] [place]. Please see a copy of my visit note below.    ASSESSMENT & PLAN  Patient Instructions     1. Pain of both shoulder joints    2. Impingement syndrome of shoulder, right    3. Impingement syndrome of left shoulder    4. Osteoarthritis of patellofemoral joints of both knees      - Patient is following up for chronic bilateral shoulder pain due to impingement, and bilateral knee pain due to osteoarthritis  -Patient was seen for her right hip pain by Dr. Reza and had a right hip intra-articular cortisone injection for hip osteoarthritis  -Patient states that her pain has improved since her last visit.  She has some milder intermittent self-limiting pain which is tolerable at this time  -Patient has stopped taking her gabapentin medicine and switched over to Cymbalta 30 mg daily.  Patient states she continues to have daily fatigue.  We discussed continuing the Cymbalta medication versus discontinuing medication.  Patient states that her fatigue began when she started the gabapentin medication which she has discontinued for couple of months without improvement.  We decided to stop taking the Cymbalta medication and once it is finishes later this week to see if her fatigue improves.  -If her fatigue does not improve, patient should follow-up with her primary care physician for further workup.  Patient should also contact her oncologist to determine if her immunodeficiency disorder is contributing to her fatigue  -Will continue to perform localized cortisone injections periodically as needed to control her pain regulating daily pain medication  -Patient will continue to follow-up with me for further treatment and recommendations.  -Call direct clinic number [[phone]] at any time with questions or  concerns.    Albert Yeo MD [facility] Orthopedics and Sports Medicine  [facility]        -----    SUBJECTIVE:  Tricia Sosa is a 84 year old female who is seen in follow-up for bilateral shoulder pain and medication check.They were last seen 5/20/2025 at that time the plan was continue Follow-up with Dr. Reza for right leg pain, refill for gabapentin, start cymbalta. Patient Dax messaged in on 6/25/25 with update after her Dr. Reza appointment on 6/19/25 due to drowsiness she discontinued gabapentin and continue duloxetine. At that time it was recommended to Follow-up in 3-4 weeks for medication review.     Since their last visit reports improvement in the left shoulder and bilateral knees. They indicate that their current pain level is 4/10. They have tried diclofenac gel, duloxetine, gabapentin, left subacromial bursa cortisone injection on 5/20/25.  Her pain is located over the AC joint and will radiate down on bilateral shoulders.    The patient is seen by themselves.    Patient's past medical, surgical, social, and family histories were reviewed today and no changes are noted.    REVIEW OF SYSTEMS:  Constitutional: NEGATIVE for fever, chills, change in weight  Skin: NEGATIVE for worrisome rashes, moles or lesions  GI/: NEGATIVE for bowel or bladder changes  Neuro: NEGATIVE for weakness, dizziness or paresthesias    OBJECTIVE:  LMP  (LMP Unknown)    General: healthy, alert and in no distress  HEENT: no scleral icterus or conjunctival erythema  Skin: no suspicious lesions or rash. No jaundice.  CV: regular rhythm by palpation, no pedal edema  Resp: normal respiratory effort without conversational dyspnea   Psych: normal mood and affect  Gait: normal steady gait with appropriate coordination and balance  Neuro: normal light touch sensory exam of the extremities.    MSK:  BILATERAL SHOULDER  Inspection:    no swelling, bruising, discoloration, or obvious deformity or  asymmetry  Palpation:  bony and tendinous landmarks are nontender.  Active Range of Motion:     Abduction 1650, FF 1800, , IR L4.    Strength:    Scapular plane abduction weakness,  ER weakness, IR weakness, biceps not tested, triceps not tested  Special Tests:    Positive: Right Neer's, Mccormick', and supraspinatus (empty can)    Negative: drop arm/painful arc, crossed arm adduction, [place]'s, Speed's, and Yergason's         Independent visualization of the below image:        Large Joint Injection/Arthocentesis: R subacromial bursa    Date/Time: 7/29/2025 1:32 PM    Performed by: Yeo, Albert, MD  Authorized by: Yeo, Albert, MD    Indications:  Pain  Needle Size:  25 G  Guidance: landmark guided    Approach:  Posterior  Location:  Shoulder      Site:  R subacromial bursa  Medications:  40 mg methylPREDNISolone 40 MG/ML; 4 mL lidocaine (PF) 1 %  Outcome:  Tolerated well, no immediate complications  Procedure discussed: discussed risks, benefits, and alternatives    Consent Given by:  Patient  Timeout: timeout called immediately prior to procedure    Prep: patient was prepped and draped in usual sterile fashion      Patient's conditions were thoroughly discussed during today's visit with total time spent face-to-face with the patient and documentation being 20 minutes excluding time to perform the procedure.    Albert Yeo MD, [facility]        Again, thank you for allowing me to participate in the care of your patient.        Sincerely,        Albert Yeo, MD    Electronically signed

## 2025-07-29 NOTE — PATIENT INSTRUCTIONS
1. Pain of both shoulder joints    2. Impingement syndrome of shoulder, right    3. Impingement syndrome of left shoulder    4. Osteoarthritis of patellofemoral joints of both knees      - Patient is following up for chronic bilateral shoulder pain due to impingement, and bilateral knee pain due to osteoarthritis  -Patient was seen for her right hip pain by Dr. Reza and had a right hip intra-articular cortisone injection for hip osteoarthritis  -Patient states that her pain has improved since her last visit.  She has some milder intermittent self-limiting pain which is tolerable at this time  -Patient has stopped taking her gabapentin medicine and switched over to Cymbalta 30 mg daily.  Patient states she continues to have daily fatigue.  We discussed continuing the Cymbalta medication versus discontinuing medication.  Patient states that her fatigue began when she started the gabapentin medication which she has discontinued for couple of months without improvement.  We decided to stop taking the Cymbalta medication and once it is finishes later this week to see if her fatigue improves.  -If her fatigue does not improve, patient should follow-up with her primary care physician for further workup.  Patient should also contact her oncologist to determine if her immunodeficiency disorder is contributing to her fatigue  -Will continue to perform localized cortisone injections periodically as needed to control her pain regulating daily pain medication  -Patient will continue to follow-up with me for further treatment and recommendations.  -Call direct clinic number [[phone]] at any time with questions or concerns.    Albert Yeo MD [facility] Orthopedics and Sports Medicine  [facility] [place]

## 2025-07-31 ENCOUNTER — OFFICE VISIT (OUTPATIENT)
Dept: INTERNAL MEDICINE | Facility: CLINIC | Age: 85
End: 2025-07-31
Payer: MEDICARE

## 2025-07-31 VITALS
BODY MASS INDEX: 19.04 KG/M2 | DIASTOLIC BLOOD PRESSURE: 72 MMHG | HEIGHT: 71 IN | WEIGHT: 136 LBS | SYSTOLIC BLOOD PRESSURE: 127 MMHG | TEMPERATURE: 97.6 F | HEART RATE: 92 BPM | RESPIRATION RATE: 16 BRPM | OXYGEN SATURATION: 98 %

## 2025-07-31 DIAGNOSIS — R68.84 JAW PAIN: Primary | ICD-10-CM

## 2025-07-31 DIAGNOSIS — M06.09 SERONEGATIVE RHEUMATOID ARTHRITIS OF MULTIPLE SITES (H): ICD-10-CM

## 2025-07-31 RX ORDER — METHOTREXATE 2.5 MG/1
15 TABLET ORAL
Qty: 72 TABLET | Refills: 0 | Status: SHIPPED | OUTPATIENT
Start: 2025-07-31

## 2025-07-31 NOTE — PROGRESS NOTES
Assessment & Plan     (R68.84) Jaw pain  (primary encounter diagnosis)  Comment: The patient reports onset of jaw pain approximately 2-3 months ago. She noted transient, soft swelling beneath her right ear about 2 months ago, which resolved within one day. She endorses clicking in the jaw with chewing and describes the pain as sharp, worsened by yawning, and persistent with mouth opening. The pain has gradually improved but remains present. She has not experienced jaw pain with talking and denies any history of temporomandibular joint (TMJ) disorder, teeth grinding, or clenching. She has not tried acetaminophen, over-the-counter analgesics, or heat therapy for symptom relief.    She also reports possible mild right ear pain but denies otorrhea. On examination, the ear canal is clear without evidence of infection. She has not seen a dentist due to lack of dental insurance but denies dental pain. She has a remote history of root canal treatment approximately 10 years ago.    On physical exam, there was no appreciable clicking, popping, or tenderness over the TMJ. Referral to the head and neck pain clinic in [place] will be placed for further evaluation.  Plan: Adult ENT  Referral            The patient sustained a fall two days ago, striking her head on a glass door or the door frame. She denies headache, visual changes, loss of consciousness, or increased somnolence. She is not taking any anticoagulant medications. On examination, there is mild ecchymosis noted on the forehead. At this time, neuroimaging is deferred given the absence of concerning symptoms. She has been advised to seek immediate evaluation in the emergency department if new symptoms develop.    The longitudinal plan of care for the diagnosis(es)/condition(s) as documented were addressed during this visit. Due to the added complexity in care, I will continue to support Tricia in the subsequent management and with ongoing continuity of  "care.      Subjective   Tricia is a 84 year old, presenting for the following health issues:  Fall (Neck/jaw pain )      7/31/2025     2:24 PM   Additional Questions   Roomed by Rowan     History of Present Illness       Reason for visit:  Jaw pain since end of May  Symptom onset:  More than a month  Symptoms include:  I have pain where the upper jaw meets the lower jaw - especially when I open my mouth for food or to yawn. I have had swelling. My jaw clicks when chewing.  Symptom intensity:  Moderate  Symptom progression:  Staying the same  Had these symptoms before:  No  What makes it worse:  Opening my mouth  What makes it better:  No She is missing 1 dose(s) of medications per week.  She is not taking prescribed medications regularly due to other.                Objective    /72   Pulse 92   Temp 97.6  F (36.4  C) (Tympanic)   Resp 16   Ht 1.803 m (5' 11\")   Wt 61.7 kg (136 lb)   LMP  (LMP Unknown)   SpO2 98%   BMI 18.97 kg/m    Body mass index is 18.97 kg/m .        Physical Exam  Constitutional:       General: She is not in acute distress.     Appearance: Normal appearance. She is not ill-appearing, toxic-appearing or diaphoretic.   HENT:      Head: Normocephalic and atraumatic.   Ears:  Right ear canal without evidence of infection  Eyes:      Conjunctiva/sclera: Conjunctivae normal.   Pulmonary:      Effort: Pulmonary effort is normal.      Breath sounds: Normal breath sounds.   Skin:     General: Skin is warm and dry.   Neurological:      Mental Status: She is alert and oriented to person, place, and time.   Psychiatric:         Mood and Affect: Mood normal.         Behavior: Behavior normal.         Thought Content: Thought content normal.         Judgment: Judgment normal.             Signed Electronically by: SHANIQUE Almeida CNP    "

## 2025-08-04 ENCOUNTER — TELEPHONE (OUTPATIENT)
Dept: INTERNAL MEDICINE | Facility: CLINIC | Age: 85
End: 2025-08-04
Payer: MEDICARE

## 2025-08-07 ENCOUNTER — TRANSFERRED RECORDS (OUTPATIENT)
Dept: HEALTH INFORMATION MANAGEMENT | Facility: CLINIC | Age: 85
End: 2025-08-07
Payer: MEDICARE

## 2025-08-08 ENCOUNTER — ONCOLOGY VISIT (OUTPATIENT)
Dept: ONCOLOGY | Facility: CLINIC | Age: 85
End: 2025-08-08
Attending: PHYSICIAN ASSISTANT
Payer: MEDICARE

## 2025-08-08 VITALS
HEART RATE: 54 BPM | RESPIRATION RATE: 18 BRPM | TEMPERATURE: 98.1 F | DIASTOLIC BLOOD PRESSURE: 66 MMHG | OXYGEN SATURATION: 98 % | SYSTOLIC BLOOD PRESSURE: 113 MMHG

## 2025-08-08 DIAGNOSIS — D83.9 CVID (COMMON VARIABLE IMMUNODEFICIENCY) (H): ICD-10-CM

## 2025-08-08 DIAGNOSIS — D53.9 MACROCYTIC ANEMIA: ICD-10-CM

## 2025-08-08 DIAGNOSIS — R53.83 OTHER FATIGUE: ICD-10-CM

## 2025-08-08 DIAGNOSIS — D59.10 AUTOIMMUNE HEMOLYTIC ANEMIA (H): Primary | ICD-10-CM

## 2025-08-08 DIAGNOSIS — D59.19 OTHER AUTOIMMUNE HEMOLYTIC ANEMIA (H): ICD-10-CM

## 2025-08-08 DIAGNOSIS — D80.3 SELECTIVE DEFICIENCY OF IMMUNOGLOBULIN G (IGG) SUBCLASSES (H): ICD-10-CM

## 2025-08-08 PROCEDURE — 99214 OFFICE O/P EST MOD 30 MIN: CPT | Performed by: PHYSICIAN ASSISTANT

## 2025-08-08 PROCEDURE — 85048 AUTOMATED LEUKOCYTE COUNT: CPT | Performed by: INTERNAL MEDICINE

## 2025-08-08 PROCEDURE — G0463 HOSPITAL OUTPT CLINIC VISIT: HCPCS | Performed by: PHYSICIAN ASSISTANT

## 2025-08-08 ASSESSMENT — PAIN SCALES - GENERAL: PAINLEVEL_OUTOF10: MILD PAIN (3)

## 2025-08-09 ENCOUNTER — TRANSFERRED RECORDS (OUTPATIENT)
Dept: HEALTH INFORMATION MANAGEMENT | Facility: CLINIC | Age: 85
End: 2025-08-09
Payer: MEDICARE

## 2025-08-10 RX ORDER — METHYLPREDNISOLONE SODIUM SUCCINATE 40 MG/ML
40 INJECTION INTRAMUSCULAR; INTRAVENOUS
Start: 2025-10-06

## 2025-08-10 RX ORDER — DIPHENHYDRAMINE HCL 25 MG
50 CAPSULE ORAL ONCE
Status: CANCELLED | OUTPATIENT
Start: 2025-08-11

## 2025-08-10 RX ORDER — METHYLPREDNISOLONE SODIUM SUCCINATE 40 MG/ML
40 INJECTION INTRAMUSCULAR; INTRAVENOUS
Status: CANCELLED
Start: 2025-08-11

## 2025-08-10 RX ORDER — METHYLPREDNISOLONE SODIUM SUCCINATE 125 MG/2ML
125 INJECTION INTRAMUSCULAR; INTRAVENOUS
Start: 2025-10-06

## 2025-08-10 RX ORDER — DIPHENHYDRAMINE HYDROCHLORIDE 50 MG/ML
50 INJECTION, SOLUTION INTRAMUSCULAR; INTRAVENOUS
Status: CANCELLED
Start: 2025-08-11

## 2025-08-10 RX ORDER — ALBUTEROL SULFATE 0.83 MG/ML
2.5 SOLUTION RESPIRATORY (INHALATION)
Status: CANCELLED | OUTPATIENT
Start: 2025-08-11

## 2025-08-10 RX ORDER — DIPHENHYDRAMINE HYDROCHLORIDE 50 MG/ML
50 INJECTION, SOLUTION INTRAMUSCULAR; INTRAVENOUS
Start: 2025-12-01

## 2025-08-10 RX ORDER — DIPHENHYDRAMINE HYDROCHLORIDE 50 MG/ML
50 INJECTION, SOLUTION INTRAMUSCULAR; INTRAVENOUS
Start: 2025-10-06

## 2025-08-10 RX ORDER — ALBUTEROL SULFATE 0.83 MG/ML
2.5 SOLUTION RESPIRATORY (INHALATION)
OUTPATIENT
Start: 2025-12-01

## 2025-08-10 RX ORDER — DIPHENHYDRAMINE HYDROCHLORIDE 50 MG/ML
25 INJECTION, SOLUTION INTRAMUSCULAR; INTRAVENOUS
Status: CANCELLED
Start: 2025-08-11

## 2025-08-10 RX ORDER — EPINEPHRINE 1 MG/ML
0.3 INJECTION, SOLUTION INTRAMUSCULAR; SUBCUTANEOUS EVERY 5 MIN PRN
OUTPATIENT
Start: 2025-12-01

## 2025-08-10 RX ORDER — MEPERIDINE HYDROCHLORIDE 25 MG/ML
25 INJECTION INTRAMUSCULAR; INTRAVENOUS; SUBCUTANEOUS
Start: 2025-10-06

## 2025-08-10 RX ORDER — DIPHENHYDRAMINE HCL 25 MG
50 CAPSULE ORAL ONCE
Status: CANCELLED | OUTPATIENT
Start: 2025-12-01

## 2025-08-10 RX ORDER — HEPARIN SODIUM (PORCINE) LOCK FLUSH IV SOLN 100 UNIT/ML 100 UNIT/ML
5 SOLUTION INTRAVENOUS
OUTPATIENT
Start: 2025-12-01

## 2025-08-10 RX ORDER — ALBUTEROL SULFATE 0.83 MG/ML
2.5 SOLUTION RESPIRATORY (INHALATION)
OUTPATIENT
Start: 2025-10-06

## 2025-08-10 RX ORDER — METHYLPREDNISOLONE SODIUM SUCCINATE 125 MG/2ML
125 INJECTION INTRAMUSCULAR; INTRAVENOUS
Status: CANCELLED
Start: 2025-08-11

## 2025-08-10 RX ORDER — METHYLPREDNISOLONE SODIUM SUCCINATE 40 MG/ML
40 INJECTION INTRAMUSCULAR; INTRAVENOUS
Start: 2025-12-01

## 2025-08-10 RX ORDER — HEPARIN SODIUM,PORCINE 10 UNIT/ML
5 VIAL (ML) INTRAVENOUS
OUTPATIENT
Start: 2025-10-06

## 2025-08-10 RX ORDER — DIPHENHYDRAMINE HCL 25 MG
50 CAPSULE ORAL ONCE
Status: CANCELLED | OUTPATIENT
Start: 2025-10-06

## 2025-08-10 RX ORDER — MEPERIDINE HYDROCHLORIDE 25 MG/ML
25 INJECTION INTRAMUSCULAR; INTRAVENOUS; SUBCUTANEOUS
Status: CANCELLED
Start: 2025-08-11

## 2025-08-10 RX ORDER — HEPARIN SODIUM (PORCINE) LOCK FLUSH IV SOLN 100 UNIT/ML 100 UNIT/ML
5 SOLUTION INTRAVENOUS
Status: CANCELLED | OUTPATIENT
Start: 2025-08-11

## 2025-08-10 RX ORDER — METHYLPREDNISOLONE SODIUM SUCCINATE 125 MG/2ML
125 INJECTION INTRAMUSCULAR; INTRAVENOUS
Start: 2025-12-01

## 2025-08-10 RX ORDER — DIPHENHYDRAMINE HYDROCHLORIDE 50 MG/ML
25 INJECTION, SOLUTION INTRAMUSCULAR; INTRAVENOUS
Start: 2025-10-06

## 2025-08-10 RX ORDER — MEPERIDINE HYDROCHLORIDE 25 MG/ML
25 INJECTION INTRAMUSCULAR; INTRAVENOUS; SUBCUTANEOUS
Start: 2025-12-01

## 2025-08-10 RX ORDER — DIPHENHYDRAMINE HYDROCHLORIDE 50 MG/ML
25 INJECTION, SOLUTION INTRAMUSCULAR; INTRAVENOUS ONCE
Start: 2025-12-01 | End: 2025-12-01

## 2025-08-10 RX ORDER — ALBUTEROL SULFATE 90 UG/1
1-2 INHALANT RESPIRATORY (INHALATION)
Start: 2025-10-06

## 2025-08-10 RX ORDER — HEPARIN SODIUM (PORCINE) LOCK FLUSH IV SOLN 100 UNIT/ML 100 UNIT/ML
5 SOLUTION INTRAVENOUS
OUTPATIENT
Start: 2025-10-06

## 2025-08-10 RX ORDER — ACETAMINOPHEN 325 MG/1
650 TABLET ORAL ONCE
Status: CANCELLED | OUTPATIENT
Start: 2025-08-11

## 2025-08-10 RX ORDER — DIPHENHYDRAMINE HYDROCHLORIDE 50 MG/ML
25 INJECTION, SOLUTION INTRAMUSCULAR; INTRAVENOUS ONCE
Status: CANCELLED
Start: 2025-08-11 | End: 2025-08-11

## 2025-08-10 RX ORDER — EPINEPHRINE 1 MG/ML
0.3 INJECTION, SOLUTION INTRAMUSCULAR; SUBCUTANEOUS EVERY 5 MIN PRN
Status: CANCELLED | OUTPATIENT
Start: 2025-08-11

## 2025-08-10 RX ORDER — HEPARIN SODIUM,PORCINE 10 UNIT/ML
5 VIAL (ML) INTRAVENOUS
Status: CANCELLED | OUTPATIENT
Start: 2025-08-11

## 2025-08-10 RX ORDER — ACETAMINOPHEN 325 MG/1
650 TABLET ORAL ONCE
Status: CANCELLED | OUTPATIENT
Start: 2025-10-06

## 2025-08-10 RX ORDER — MEPERIDINE HYDROCHLORIDE 25 MG/ML
25 INJECTION INTRAMUSCULAR; INTRAVENOUS; SUBCUTANEOUS
OUTPATIENT
Start: 2025-12-01

## 2025-08-10 RX ORDER — HEPARIN SODIUM,PORCINE 10 UNIT/ML
5 VIAL (ML) INTRAVENOUS
OUTPATIENT
Start: 2025-12-01

## 2025-08-10 RX ORDER — DIPHENHYDRAMINE HYDROCHLORIDE 50 MG/ML
25 INJECTION, SOLUTION INTRAMUSCULAR; INTRAVENOUS
Start: 2025-12-01

## 2025-08-10 RX ORDER — ALBUTEROL SULFATE 90 UG/1
1-2 INHALANT RESPIRATORY (INHALATION)
Start: 2025-12-01

## 2025-08-10 RX ORDER — ALBUTEROL SULFATE 90 UG/1
1-2 INHALANT RESPIRATORY (INHALATION)
Status: CANCELLED
Start: 2025-08-11

## 2025-08-10 RX ORDER — MEPERIDINE HYDROCHLORIDE 25 MG/ML
25 INJECTION INTRAMUSCULAR; INTRAVENOUS; SUBCUTANEOUS
Status: CANCELLED | OUTPATIENT
Start: 2025-08-11

## 2025-08-10 RX ORDER — DIPHENHYDRAMINE HYDROCHLORIDE 50 MG/ML
25 INJECTION, SOLUTION INTRAMUSCULAR; INTRAVENOUS ONCE
Start: 2025-10-06 | End: 2025-10-06

## 2025-08-10 RX ORDER — MEPERIDINE HYDROCHLORIDE 25 MG/ML
25 INJECTION INTRAMUSCULAR; INTRAVENOUS; SUBCUTANEOUS
OUTPATIENT
Start: 2025-10-06

## 2025-08-10 RX ORDER — ACETAMINOPHEN 325 MG/1
650 TABLET ORAL ONCE
Status: CANCELLED | OUTPATIENT
Start: 2025-12-01

## 2025-08-10 RX ORDER — EPINEPHRINE 1 MG/ML
0.3 INJECTION, SOLUTION INTRAMUSCULAR; SUBCUTANEOUS EVERY 5 MIN PRN
OUTPATIENT
Start: 2025-10-06

## 2025-08-11 ENCOUNTER — INFUSION THERAPY VISIT (OUTPATIENT)
Dept: INFUSION THERAPY | Facility: CLINIC | Age: 85
End: 2025-08-11
Attending: PHYSICIAN ASSISTANT
Payer: MEDICARE

## 2025-08-11 VITALS
SYSTOLIC BLOOD PRESSURE: 122 MMHG | OXYGEN SATURATION: 95 % | DIASTOLIC BLOOD PRESSURE: 65 MMHG | RESPIRATION RATE: 18 BRPM | TEMPERATURE: 98.5 F | BODY MASS INDEX: 19.34 KG/M2 | WEIGHT: 138.7 LBS | HEART RATE: 73 BPM

## 2025-08-11 DIAGNOSIS — D80.3 SELECTIVE DEFICIENCY OF IMMUNOGLOBULIN G (IGG) SUBCLASSES (H): ICD-10-CM

## 2025-08-11 DIAGNOSIS — D59.10 AUTOIMMUNE HEMOLYTIC ANEMIA (H): ICD-10-CM

## 2025-08-11 DIAGNOSIS — D80.3 SELECTIVE DEFICIENCY OF IGG SUBCLASSES (H): ICD-10-CM

## 2025-08-11 DIAGNOSIS — D83.9 CVID (COMMON VARIABLE IMMUNODEFICIENCY) (H): ICD-10-CM

## 2025-08-11 DIAGNOSIS — D59.19 OTHER AUTOIMMUNE HEMOLYTIC ANEMIA (H): Primary | ICD-10-CM

## 2025-08-11 DIAGNOSIS — R53.83 OTHER FATIGUE: ICD-10-CM

## 2025-08-11 DIAGNOSIS — D53.9 MACROCYTIC ANEMIA: ICD-10-CM

## 2025-08-11 LAB
ALBUMIN SERPL BCG-MCNC: 4 G/DL (ref 3.5–5.2)
ALP SERPL-CCNC: 130 U/L (ref 40–150)
ALT SERPL W P-5'-P-CCNC: 10 U/L (ref 0–50)
ANION GAP SERPL CALCULATED.3IONS-SCNC: 10 MMOL/L (ref 7–15)
AST SERPL W P-5'-P-CCNC: 21 U/L (ref 0–45)
BASOPHILS # BLD AUTO: 0 10E3/UL (ref 0–0.2)
BASOPHILS NFR BLD AUTO: 0 %
BILIRUB SERPL-MCNC: 0.5 MG/DL
BUN SERPL-MCNC: 16.6 MG/DL (ref 8–23)
CALCIUM SERPL-MCNC: 8.6 MG/DL (ref 8.8–10.4)
CHLORIDE SERPL-SCNC: 101 MMOL/L (ref 98–107)
CREAT SERPL-MCNC: 0.47 MG/DL (ref 0.51–0.95)
EGFRCR SERPLBLD CKD-EPI 2021: >90 ML/MIN/1.73M2
EOSINOPHIL # BLD AUTO: 0 10E3/UL (ref 0–0.7)
EOSINOPHIL NFR BLD AUTO: 0 %
ERYTHROCYTE [DISTWIDTH] IN BLOOD BY AUTOMATED COUNT: 16.6 % (ref 10–15)
FERRITIN SERPL-MCNC: 66 NG/ML (ref 11–328)
FOLATE SERPL-MCNC: 15.7 NG/ML (ref 4.6–34.8)
GLUCOSE SERPL-MCNC: 145 MG/DL (ref 70–99)
HAPTOGLOB SERPL-MCNC: 90 MG/DL (ref 30–200)
HCO3 SERPL-SCNC: 26 MMOL/L (ref 22–29)
HCT VFR BLD AUTO: 30.1 % (ref 35–47)
HGB BLD-MCNC: 10.3 G/DL (ref 11.7–15.7)
IMM GRANULOCYTES # BLD: 0 10E3/UL
IMM GRANULOCYTES NFR BLD: 0 %
IRON BINDING CAPACITY (ROCHE): 267 UG/DL (ref 240–430)
IRON SATN MFR SERPL: 28 % (ref 15–46)
IRON SERPL-MCNC: 75 UG/DL (ref 37–145)
LDH SERPL L TO P-CCNC: 208 U/L (ref 0–250)
LYMPHOCYTES # BLD AUTO: 1.9 10E3/UL (ref 0.8–5.3)
LYMPHOCYTES NFR BLD AUTO: 36 %
MCH RBC QN AUTO: 34.9 PG (ref 26.5–33)
MCHC RBC AUTO-ENTMCNC: 34.2 G/DL (ref 31.5–36.5)
MCV RBC AUTO: 102 FL (ref 78–100)
MONOCYTES # BLD AUTO: 0.6 10E3/UL (ref 0–1.3)
MONOCYTES NFR BLD AUTO: 12 %
NEUTROPHILS # BLD AUTO: 2.6 10E3/UL (ref 1.6–8.3)
NEUTROPHILS NFR BLD AUTO: 51 %
NRBC # BLD AUTO: 0 10E3/UL
NRBC BLD AUTO-RTO: 0 /100
PLATELET # BLD AUTO: 167 10E3/UL (ref 150–450)
POTASSIUM SERPL-SCNC: 3.9 MMOL/L (ref 3.4–5.3)
PROT SERPL-MCNC: 5.7 G/DL (ref 6.4–8.3)
RBC # BLD AUTO: 2.95 10E6/UL (ref 3.8–5.2)
SODIUM SERPL-SCNC: 137 MMOL/L (ref 135–145)
T4 FREE SERPL-MCNC: 1.4 NG/DL (ref 0.9–1.7)
TSH SERPL DL<=0.005 MIU/L-ACNC: 13.45 UIU/ML (ref 0.3–4.2)
VIT B12 SERPL-MCNC: 1913 PG/ML (ref 232–1245)
VIT D+METAB SERPL-MCNC: 37 NG/ML (ref 20–50)
WBC # BLD AUTO: 5.1 10E3/UL (ref 4–11)

## 2025-08-11 PROCEDURE — 84439 ASSAY OF FREE THYROXINE: CPT | Performed by: PHYSICIAN ASSISTANT

## 2025-08-11 PROCEDURE — 84443 ASSAY THYROID STIM HORMONE: CPT | Performed by: PHYSICIAN ASSISTANT

## 2025-08-11 PROCEDURE — 82310 ASSAY OF CALCIUM: CPT | Performed by: PHYSICIAN ASSISTANT

## 2025-08-11 PROCEDURE — 83010 ASSAY OF HAPTOGLOBIN QUANT: CPT | Performed by: PHYSICIAN ASSISTANT

## 2025-08-11 PROCEDURE — 82746 ASSAY OF FOLIC ACID SERUM: CPT | Performed by: PHYSICIAN ASSISTANT

## 2025-08-11 PROCEDURE — 83540 ASSAY OF IRON: CPT | Performed by: PHYSICIAN ASSISTANT

## 2025-08-11 PROCEDURE — 83615 LACTATE (LD) (LDH) ENZYME: CPT | Performed by: PHYSICIAN ASSISTANT

## 2025-08-11 PROCEDURE — 82306 VITAMIN D 25 HYDROXY: CPT | Performed by: PHYSICIAN ASSISTANT

## 2025-08-11 PROCEDURE — 85004 AUTOMATED DIFF WBC COUNT: CPT | Performed by: PHYSICIAN ASSISTANT

## 2025-08-11 PROCEDURE — 82607 VITAMIN B-12: CPT | Performed by: PHYSICIAN ASSISTANT

## 2025-08-11 PROCEDURE — 84238 ASSAY NONENDOCRINE RECEPTOR: CPT | Performed by: PHYSICIAN ASSISTANT

## 2025-08-11 PROCEDURE — 82728 ASSAY OF FERRITIN: CPT | Performed by: PHYSICIAN ASSISTANT

## 2025-08-11 RX ORDER — METHYLPREDNISOLONE SODIUM SUCCINATE 40 MG/ML
20 INJECTION INTRAMUSCULAR; INTRAVENOUS ONCE
Start: 2025-10-06 | End: 2025-10-06

## 2025-08-11 RX ORDER — ACETAMINOPHEN 325 MG/1
325 TABLET ORAL ONCE
OUTPATIENT
Start: 2025-10-06

## 2025-08-11 RX ORDER — METHYLPREDNISOLONE SODIUM SUCCINATE 40 MG/ML
20 INJECTION INTRAMUSCULAR; INTRAVENOUS ONCE
Status: COMPLETED | OUTPATIENT
Start: 2025-08-11 | End: 2025-08-11

## 2025-08-11 RX ORDER — ALBUTEROL SULFATE 0.83 MG/ML
2.5 SOLUTION RESPIRATORY (INHALATION)
OUTPATIENT
Start: 2025-10-06

## 2025-08-11 RX ORDER — HUMAN IMMUNOGLOBULIN G 20 G/200ML
35 LIQUID INTRAVENOUS ONCE
Start: 2025-10-06 | End: 2025-10-06

## 2025-08-11 RX ORDER — DIPHENHYDRAMINE HCL 25 MG
25 CAPSULE ORAL ONCE
OUTPATIENT
Start: 2025-10-06

## 2025-08-11 RX ORDER — HEPARIN SODIUM (PORCINE) LOCK FLUSH IV SOLN 100 UNIT/ML 100 UNIT/ML
5 SOLUTION INTRAVENOUS
OUTPATIENT
Start: 2025-10-06

## 2025-08-11 RX ORDER — HEPARIN SODIUM,PORCINE 10 UNIT/ML
5 VIAL (ML) INTRAVENOUS
OUTPATIENT
Start: 2025-10-06

## 2025-08-11 RX ORDER — DIPHENHYDRAMINE HYDROCHLORIDE 50 MG/ML
50 INJECTION, SOLUTION INTRAMUSCULAR; INTRAVENOUS
Start: 2025-10-06

## 2025-08-11 RX ORDER — HUMAN IMMUNOGLOBULIN G 20 G/200ML
35 LIQUID INTRAVENOUS ONCE
Status: COMPLETED | OUTPATIENT
Start: 2025-08-11 | End: 2025-08-11

## 2025-08-11 RX ORDER — ACETAMINOPHEN 325 MG/1
650 TABLET ORAL ONCE
Status: COMPLETED | OUTPATIENT
Start: 2025-08-11 | End: 2025-08-11

## 2025-08-11 RX ORDER — ALBUTEROL SULFATE 90 UG/1
1-2 INHALANT RESPIRATORY (INHALATION)
Start: 2025-10-06

## 2025-08-11 RX ORDER — METHYLPREDNISOLONE SODIUM SUCCINATE 40 MG/ML
40 INJECTION INTRAMUSCULAR; INTRAVENOUS
Start: 2025-10-06

## 2025-08-11 RX ORDER — DIPHENHYDRAMINE HCL 25 MG
50 CAPSULE ORAL ONCE
Status: COMPLETED | OUTPATIENT
Start: 2025-08-11 | End: 2025-08-11

## 2025-08-11 RX ORDER — DIPHENHYDRAMINE HYDROCHLORIDE 50 MG/ML
25 INJECTION, SOLUTION INTRAMUSCULAR; INTRAVENOUS
Start: 2025-10-06

## 2025-08-11 RX ORDER — EPINEPHRINE 1 MG/ML
0.3 INJECTION, SOLUTION INTRAMUSCULAR; SUBCUTANEOUS EVERY 5 MIN PRN
OUTPATIENT
Start: 2025-10-06

## 2025-08-11 RX ORDER — MEPERIDINE HYDROCHLORIDE 25 MG/ML
25 INJECTION INTRAMUSCULAR; INTRAVENOUS; SUBCUTANEOUS
OUTPATIENT
Start: 2025-10-06

## 2025-08-11 RX ADMIN — ACETAMINOPHEN 325 MG: 325 TABLET ORAL at 12:59

## 2025-08-11 RX ADMIN — Medication 25 MG: at 12:59

## 2025-08-11 RX ADMIN — HUMAN IMMUNOGLOBULIN G 35 G: 20 LIQUID INTRAVENOUS at 11:36

## 2025-08-11 RX ADMIN — METHYLPREDNISOLONE SODIUM SUCCINATE 20 MG: 40 INJECTION INTRAMUSCULAR; INTRAVENOUS at 11:21

## 2025-08-12 LAB — STFR SERPL-MCNC: 3.2 MG/L

## 2025-08-21 ENCOUNTER — VIRTUAL VISIT (OUTPATIENT)
Dept: ONCOLOGY | Facility: CLINIC | Age: 85
End: 2025-08-21
Attending: STUDENT IN AN ORGANIZED HEALTH CARE EDUCATION/TRAINING PROGRAM
Payer: MEDICARE

## 2025-08-21 VITALS — WEIGHT: 136 LBS | HEIGHT: 71 IN | BODY MASS INDEX: 19.04 KG/M2

## 2025-08-21 DIAGNOSIS — L30.9 DERMATITIS: ICD-10-CM

## 2025-08-21 DIAGNOSIS — R26.89 BALANCE PROBLEMS: ICD-10-CM

## 2025-08-21 DIAGNOSIS — D83.9 CVID (COMMON VARIABLE IMMUNODEFICIENCY) (H): Primary | ICD-10-CM

## 2025-08-21 DIAGNOSIS — M62.81 MUSCLE WEAKNESS (GENERALIZED): ICD-10-CM

## 2025-08-21 DIAGNOSIS — R53.83 OTHER FATIGUE: ICD-10-CM

## 2025-08-21 DIAGNOSIS — M19.90 ARTHRITIS: ICD-10-CM

## 2025-08-21 RX ORDER — KETOCONAZOLE 20 MG/ML
SHAMPOO, SUSPENSION TOPICAL
Qty: 120 ML | Refills: 3 | Status: SHIPPED | OUTPATIENT
Start: 2025-08-21

## 2025-08-21 ASSESSMENT — PAIN SCALES - GENERAL: PAINLEVEL_OUTOF10: MILD PAIN (3)

## 2025-09-01 ENCOUNTER — APPOINTMENT (OUTPATIENT)
Dept: CT IMAGING | Facility: CLINIC | Age: 85
End: 2025-09-01
Attending: EMERGENCY MEDICINE
Payer: MEDICARE

## 2025-09-01 ENCOUNTER — APPOINTMENT (OUTPATIENT)
Dept: GENERAL RADIOLOGY | Facility: CLINIC | Age: 85
End: 2025-09-01
Payer: MEDICARE

## 2025-09-01 ENCOUNTER — HOSPITAL ENCOUNTER (EMERGENCY)
Facility: CLINIC | Age: 85
Discharge: HOME | End: 2025-09-01
Payer: MEDICARE

## 2025-09-01 VITALS
RESPIRATION RATE: 20 BRPM | DIASTOLIC BLOOD PRESSURE: 63 MMHG | OXYGEN SATURATION: 96 % | BODY MASS INDEX: 18.62 KG/M2 | SYSTOLIC BLOOD PRESSURE: 136 MMHG | WEIGHT: 133 LBS | HEART RATE: 73 BPM | HEIGHT: 71 IN | TEMPERATURE: 97.7 F

## 2025-09-01 DIAGNOSIS — S01.111A LACERATION OF EYELID OF RIGHT EYE WITHOUT FOREIGN BODY, INITIAL ENCOUNTER: ICD-10-CM
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
## 2025-09-01 LAB
ALBUMIN SERPL BCG-MCNC: 3.7 G/DL (ref 3.5–5.2)
ALBUMIN UR-MCNC: NEGATIVE MG/DL
ALP SERPL-CCNC: 116 U/L (ref 40–150)
ALT SERPL W P-5'-P-CCNC: 8 U/L (ref 0–50)
AMORPH CRY #/AREA URNS HPF: ABNORMAL /HPF
ANION GAP SERPL CALCULATED.3IONS-SCNC: 6 MMOL/L (ref 7–15)
APPEARANCE UR: CLEAR
AST SERPL W P-5'-P-CCNC: 21 U/L (ref 0–45)
BACTERIA #/AREA URNS HPF: ABNORMAL /HPF
BASOPHILS # BLD AUTO: <0.03 10E3/UL (ref 0–0.2)
BASOPHILS NFR BLD AUTO: 0.2 %
BILIRUB SERPL-MCNC: 0.5 MG/DL
BILIRUB UR QL STRIP: NEGATIVE
BUN SERPL-MCNC: 17.3 MG/DL (ref 8–23)
CALCIUM SERPL-MCNC: 8.7 MG/DL (ref 8.8–10.4)
CHLORIDE SERPL-SCNC: 97 MMOL/L (ref 98–107)
COLOR UR AUTO: ABNORMAL
CREAT SERPL-MCNC: 0.42 MG/DL (ref 0.51–0.95)
EGFRCR SERPLBLD CKD-EPI 2021: >90 ML/MIN/1.73M2
EOSINOPHIL # BLD AUTO: <0.03 10E3/UL (ref 0–0.7)
EOSINOPHIL NFR BLD AUTO: 0 %
ERYTHROCYTE [DISTWIDTH] IN BLOOD BY AUTOMATED COUNT: 15.2 % (ref 10–15)
GLUCOSE SERPL-MCNC: 107 MG/DL (ref 70–99)
GLUCOSE UR STRIP-MCNC: NEGATIVE MG/DL
HCO3 SERPL-SCNC: 27 MMOL/L (ref 22–29)
HCT VFR BLD AUTO: 29.2 % (ref 35–47)
HGB BLD-MCNC: 10.1 G/DL (ref 11.7–15.7)
HGB UR QL STRIP: NEGATIVE
IMM GRANULOCYTES # BLD: <0.03 10E3/UL
IMM GRANULOCYTES NFR BLD: 0.2 %
KETONES UR STRIP-MCNC: NEGATIVE MG/DL
LEUKOCYTE ESTERASE UR QL STRIP: NEGATIVE
LYMPHOCYTES # BLD AUTO: 3.1 10E3/UL (ref 0.8–5.3)
LYMPHOCYTES NFR BLD AUTO: 59.7 %
MAGNESIUM SERPL-MCNC: 2.1 MG/DL (ref 1.7–2.3)
MCH RBC QN AUTO: 34.1 PG (ref 26.5–33)
MCHC RBC AUTO-ENTMCNC: 34.6 G/DL (ref 31.5–36.5)
MCV RBC AUTO: 98.6 FL (ref 78–100)
MONOCYTES # BLD AUTO: 0.54 10E3/UL (ref 0–1.3)
MONOCYTES NFR BLD AUTO: 10.4 %
MUCOUS THREADS #/AREA URNS LPF: PRESENT /LPF
NEUTROPHILS # BLD AUTO: 1.53 10E3/UL (ref 1.6–8.3)
NEUTROPHILS NFR BLD AUTO: 29.5 %
NITRATE UR QL: NEGATIVE
NRBC # BLD AUTO: <0.03 10E3/UL
NRBC BLD AUTO-RTO: 0 /100
PH UR STRIP: 7 [PH] (ref 5–7)
PLATELET # BLD AUTO: 174 10E3/UL (ref 150–450)
POTASSIUM SERPL-SCNC: 3.7 MMOL/L (ref 3.4–5.3)
PROT SERPL-MCNC: 5.7 G/DL (ref 6.4–8.3)
RBC # BLD AUTO: 2.96 10E6/UL (ref 3.8–5.2)
RBC URINE: 2 /HPF
SODIUM SERPL-SCNC: 130 MMOL/L (ref 135–145)
SP GR UR STRIP: 1.01 (ref 1–1.03)
SQUAMOUS EPITHELIAL: <1 /HPF
TROPONIN T SERPL HS-MCNC: 11 NG/L
TROPONIN T SERPL HS-MCNC: 11 NG/L
UROBILINOGEN UR STRIP-MCNC: NORMAL MG/DL
WBC # BLD AUTO: 5.19 10E3/UL (ref 4–11)
WBC URINE: 3 /HPF

## 2025-09-01 PROCEDURE — 81001 URINALYSIS AUTO W/SCOPE: CPT

## 2025-09-01 PROCEDURE — 96360 HYDRATION IV INFUSION INIT: CPT

## 2025-09-01 PROCEDURE — 70450 CT HEAD/BRAIN W/O DYE: CPT

## 2025-09-01 PROCEDURE — 12001 RPR S/N/AX/GEN/TRNK 2.5CM/<: CPT

## 2025-09-01 PROCEDURE — 93005 ELECTROCARDIOGRAM TRACING: CPT

## 2025-09-01 PROCEDURE — 36415 COLL VENOUS BLD VENIPUNCTURE: CPT

## 2025-09-01 PROCEDURE — 84484 ASSAY OF TROPONIN QUANT: CPT

## 2025-09-01 PROCEDURE — 258N000003 HC RX IP 258 OP 636

## 2025-09-01 PROCEDURE — 83735 ASSAY OF MAGNESIUM: CPT

## 2025-09-01 PROCEDURE — 71046 X-RAY EXAM CHEST 2 VIEWS: CPT

## 2025-09-01 PROCEDURE — 82247 BILIRUBIN TOTAL: CPT

## 2025-09-01 PROCEDURE — 99285 EMERGENCY DEPT VISIT HI MDM: CPT | Mod: 25

## 2025-09-01 PROCEDURE — 85004 AUTOMATED DIFF WBC COUNT: CPT

## 2025-09-01 PROCEDURE — 72125 CT NECK SPINE W/O DYE: CPT

## 2025-09-01 PROCEDURE — 250N000011 HC RX IP 250 OP 636

## 2025-09-01 PROCEDURE — 250N000013 HC RX MED GY IP 250 OP 250 PS 637

## 2025-09-01 RX ORDER — MECLIZINE HYDROCHLORIDE 25 MG/1
50 TABLET ORAL ONCE
Status: COMPLETED | OUTPATIENT
Start: 2025-09-01 | End: 2025-09-01

## 2025-09-01 RX ORDER — ACETAMINOPHEN 325 MG/1
975 TABLET ORAL ONCE
Status: COMPLETED | OUTPATIENT
Start: 2025-09-01 | End: 2025-09-01

## 2025-09-01 RX ORDER — LIDOCAINE HYDROCHLORIDE AND EPINEPHRINE 10; 10 MG/ML; UG/ML
1 INJECTION, SOLUTION INFILTRATION; PERINEURAL ONCE
Status: COMPLETED | OUTPATIENT
Start: 2025-09-01 | End: 2025-09-01

## 2025-09-01 RX ADMIN — LIDOCAINE HYDROCHLORIDE,EPINEPHRINE BITARTRATE 1 ML: 10; .01 INJECTION, SOLUTION INFILTRATION; PERINEURAL at 15:49

## 2025-09-01 RX ADMIN — MECLIZINE HYDROCHLORIDE 50 MG: 25 TABLET ORAL at 16:59

## 2025-09-01 RX ADMIN — SODIUM CHLORIDE 1000 ML: 0.9 INJECTION, SOLUTION INTRAVENOUS at 15:49

## 2025-09-01 ASSESSMENT — ACTIVITIES OF DAILY LIVING (ADL)
ADLS_ACUITY_SCORE: 42

## 2025-09-02 DIAGNOSIS — M79.18 CHRONIC MUSCULOSKELETAL PAIN: ICD-10-CM

## 2025-09-02 DIAGNOSIS — G89.29 CHRONIC MUSCULOSKELETAL PAIN: ICD-10-CM

## 2025-09-02 LAB
ATRIAL RATE - MUSE: 74 BPM
DIASTOLIC BLOOD PRESSURE - MUSE: NORMAL MMHG
INTERPRETATION ECG - MUSE: NORMAL
P AXIS - MUSE: 77 DEGREES
PR INTERVAL - MUSE: 190 MS
QRS DURATION - MUSE: 90 MS
QT - MUSE: 420 MS
QTC - MUSE: 466 MS
R AXIS - MUSE: 7 DEGREES
SYSTOLIC BLOOD PRESSURE - MUSE: NORMAL MMHG
T AXIS - MUSE: 70 DEGREES
VENTRICULAR RATE- MUSE: 74 BPM

## 2025-09-03 RX ORDER — DULOXETIN HYDROCHLORIDE 30 MG/1
30 CAPSULE, DELAYED RELEASE ORAL DAILY
Qty: 90 CAPSULE | Refills: 1 | Status: SHIPPED | OUTPATIENT
Start: 2025-09-03

## (undated) DEVICE — DRAPE LAP W/ARMBOARD 29410

## (undated) DEVICE — ENDO FORCEP ENDOJAW BIOPSY 2.8MMX160CM FB-220K

## (undated) DEVICE — ENDO SNARE EXACTO COLD 9MM LOOP 2.4MMX230CM 00711115

## (undated) DEVICE — GLOVE BIOGEL PI MICRO INDICATOR UNDERGLOVE SZ 7.0 48970

## (undated) DEVICE — SU VICRYL 3-0 TIE 12X18" J904T

## (undated) DEVICE — SU VICRYL 3-0 SH 27" J316H

## (undated) DEVICE — LINEN TOWEL PACK X5 5464

## (undated) DEVICE — PACK MINOR CUSTOM RIDGES SBA32RMRMA

## (undated) DEVICE — SU VICRYL 4-0 PS-2 18" UND J496H

## (undated) DEVICE — KIT ENDO TURNOVER/PROCEDURE W/CLEAN A SCOPE LINERS 103888

## (undated) DEVICE — GLOVE BIOGEL PI MICRO SZ 8.0 48580

## (undated) DEVICE — GLOVE PROTEXIS BLUE W/NEU-THERA 7.5  2D73EB75

## (undated) DEVICE — SOL WATER IRRIG 1000ML BOTTLE 2F7114

## (undated) DEVICE — SU DERMABOND MINI DHVM12

## (undated) DEVICE — PREP CHLORAPREP 26ML TINTED HI-LITE ORANGE 930815

## (undated) DEVICE — TONGUE DEPRESSOR STERILE 6023

## (undated) DEVICE — SU VICRYL 3-0 SH 27" UND J416H

## (undated) DEVICE — CLIP HEMOSTASIS ASSURANCE W16 MM BX00711884

## (undated) DEVICE — SOL NACL 0.9% IRRIG 1000ML BOTTLE 2F7124

## (undated) DEVICE — SUCTION CANISTER MEDIVAC LINER 3000ML W/LID 65651-530

## (undated) DEVICE — ENDO TRAP POLYP QUICK CATCH 710201

## (undated) DEVICE — ENDO BITE BLOCK ADULT OLYMPUS LATEX FREE MAJ-1632

## (undated) RX ORDER — FENTANYL CITRATE 50 UG/ML
INJECTION, SOLUTION INTRAMUSCULAR; INTRAVENOUS
Status: DISPENSED
Start: 2024-02-22

## (undated) RX ORDER — LIDOCAINE HYDROCHLORIDE 10 MG/ML
INJECTION, SOLUTION EPIDURAL; INFILTRATION; INTRACAUDAL; PERINEURAL
Status: DISPENSED
Start: 2024-01-16

## (undated) RX ORDER — GLYCOPYRROLATE 0.2 MG/ML
INJECTION, SOLUTION INTRAMUSCULAR; INTRAVENOUS
Status: DISPENSED
Start: 2024-01-16

## (undated) RX ORDER — DEXAMETHASONE SODIUM PHOSPHATE 4 MG/ML
INJECTION, SOLUTION INTRA-ARTICULAR; INTRALESIONAL; INTRAMUSCULAR; INTRAVENOUS; SOFT TISSUE
Status: DISPENSED
Start: 2024-01-16

## (undated) RX ORDER — FENTANYL CITRATE-0.9 % NACL/PF 10 MCG/ML
PLASTIC BAG, INJECTION (ML) INTRAVENOUS
Status: DISPENSED
Start: 2024-01-16

## (undated) RX ORDER — ACETAMINOPHEN 325 MG/1
TABLET ORAL
Status: DISPENSED
Start: 2024-01-16

## (undated) RX ORDER — PROPOFOL 10 MG/ML
INJECTION, EMULSION INTRAVENOUS
Status: DISPENSED
Start: 2024-01-16

## (undated) RX ORDER — CEFAZOLIN SODIUM/WATER 2 G/20 ML
SYRINGE (ML) INTRAVENOUS
Status: DISPENSED
Start: 2024-01-16

## (undated) RX ORDER — FENTANYL CITRATE 50 UG/ML
INJECTION, SOLUTION INTRAMUSCULAR; INTRAVENOUS
Status: DISPENSED
Start: 2024-01-16

## (undated) RX ORDER — BUPIVACAINE HYDROCHLORIDE AND EPINEPHRINE 2.5; 5 MG/ML; UG/ML
INJECTION, SOLUTION EPIDURAL; INFILTRATION; INTRACAUDAL; PERINEURAL
Status: DISPENSED
Start: 2024-01-16

## (undated) RX ORDER — ONDANSETRON 2 MG/ML
INJECTION INTRAMUSCULAR; INTRAVENOUS
Status: DISPENSED
Start: 2024-01-16